# Patient Record
Sex: MALE | Race: WHITE | NOT HISPANIC OR LATINO
[De-identification: names, ages, dates, MRNs, and addresses within clinical notes are randomized per-mention and may not be internally consistent; named-entity substitution may affect disease eponyms.]

---

## 2018-01-10 ENCOUNTER — APPOINTMENT (OUTPATIENT)
Dept: OPHTHALMOLOGY | Facility: CLINIC | Age: 73
End: 2018-01-10
Payer: MEDICARE

## 2018-01-10 PROCEDURE — 92225: CPT | Mod: RT

## 2018-01-10 PROCEDURE — 92014 COMPRE OPH EXAM EST PT 1/>: CPT

## 2018-04-30 ENCOUNTER — APPOINTMENT (OUTPATIENT)
Dept: UROLOGY | Facility: CLINIC | Age: 73
End: 2018-04-30
Payer: MEDICARE

## 2018-04-30 VITALS
WEIGHT: 205 LBS | SYSTOLIC BLOOD PRESSURE: 124 MMHG | DIASTOLIC BLOOD PRESSURE: 69 MMHG | HEIGHT: 78 IN | OXYGEN SATURATION: 95 % | BODY MASS INDEX: 23.72 KG/M2 | HEART RATE: 60 BPM | TEMPERATURE: 98.5 F

## 2018-04-30 DIAGNOSIS — Q63.1 LOBULATED, FUSED AND HORSESHOE KIDNEY: ICD-10-CM

## 2018-04-30 DIAGNOSIS — Z00.00 ENCOUNTER FOR GENERAL ADULT MEDICAL EXAMINATION W/OUT ABNORMAL FINDINGS: ICD-10-CM

## 2018-04-30 DIAGNOSIS — R73.09 OTHER ABNORMAL GLUCOSE: ICD-10-CM

## 2018-04-30 PROCEDURE — 99204 OFFICE O/P NEW MOD 45 MIN: CPT

## 2018-05-01 LAB
APPEARANCE: CLEAR
BACTERIA: NEGATIVE
BILIRUBIN URINE: NEGATIVE
BLOOD URINE: NEGATIVE
COLOR: YELLOW
GLUCOSE QUALITATIVE U: NEGATIVE MG/DL
KETONES URINE: NEGATIVE
LEUKOCYTE ESTERASE URINE: NEGATIVE
MICROSCOPIC-UA: NORMAL
NITRITE URINE: NEGATIVE
PH URINE: 5.5
PROTEIN URINE: NEGATIVE MG/DL
RED BLOOD CELLS URINE: 1 /HPF
SPECIFIC GRAVITY URINE: 1.01
SQUAMOUS EPITHELIAL CELLS: 0 /HPF
UROBILINOGEN URINE: NEGATIVE MG/DL
WHITE BLOOD CELLS URINE: 0 /HPF

## 2018-05-04 LAB — CORE LAB FLUID CYTOLOGY: NORMAL

## 2018-05-10 ENCOUNTER — FORM ENCOUNTER (OUTPATIENT)
Age: 73
End: 2018-05-10

## 2018-05-11 ENCOUNTER — APPOINTMENT (OUTPATIENT)
Dept: CT IMAGING | Facility: IMAGING CENTER | Age: 73
End: 2018-05-11
Payer: MEDICARE

## 2018-05-11 ENCOUNTER — OUTPATIENT (OUTPATIENT)
Dept: OUTPATIENT SERVICES | Facility: HOSPITAL | Age: 73
LOS: 1 days | End: 2018-05-11
Payer: MEDICARE

## 2018-05-11 DIAGNOSIS — R73.09 OTHER ABNORMAL GLUCOSE: ICD-10-CM

## 2018-05-11 DIAGNOSIS — R31.9 HEMATURIA, UNSPECIFIED: ICD-10-CM

## 2018-05-11 DIAGNOSIS — Z00.00 ENCOUNTER FOR GENERAL ADULT MEDICAL EXAMINATION WITHOUT ABNORMAL FINDINGS: ICD-10-CM

## 2018-05-11 PROCEDURE — 74178 CT ABD&PLV WO CNTR FLWD CNTR: CPT | Mod: 26

## 2018-05-11 PROCEDURE — 74178 CT ABD&PLV WO CNTR FLWD CNTR: CPT

## 2018-06-06 ENCOUNTER — CLINICAL ADVICE (OUTPATIENT)
Age: 73
End: 2018-06-06

## 2018-06-06 ENCOUNTER — APPOINTMENT (OUTPATIENT)
Age: 73
End: 2018-06-06
Payer: MEDICARE

## 2018-06-06 VITALS
WEIGHT: 210 LBS | HEART RATE: 109 BPM | SYSTOLIC BLOOD PRESSURE: 130 MMHG | TEMPERATURE: 98.2 F | BODY MASS INDEX: 32.96 KG/M2 | RESPIRATION RATE: 18 BRPM | HEIGHT: 66.93 IN | DIASTOLIC BLOOD PRESSURE: 76 MMHG | OXYGEN SATURATION: 98 %

## 2018-06-06 DIAGNOSIS — R31.9 HEMATURIA, UNSPECIFIED: ICD-10-CM

## 2018-06-06 PROCEDURE — 99214 OFFICE O/P EST MOD 30 MIN: CPT | Mod: 25

## 2018-06-06 PROCEDURE — 52000 CYSTOURETHROSCOPY: CPT

## 2018-06-07 LAB
APPEARANCE: ABNORMAL
BACTERIA UR CULT: NORMAL
BACTERIA: NEGATIVE
BILIRUBIN URINE: NEGATIVE
BLOOD URINE: ABNORMAL
COLOR: ABNORMAL
GLUCOSE QUALITATIVE U: NEGATIVE MG/DL
HYALINE CASTS: 1 /LPF
KETONES URINE: NEGATIVE
LEUKOCYTE ESTERASE URINE: NEGATIVE
MICROSCOPIC-UA: NORMAL
NITRITE URINE: NEGATIVE
PH URINE: 6
PROTEIN URINE: 30 MG/DL
RED BLOOD CELLS URINE: >720 /HPF
SPECIFIC GRAVITY URINE: 1.02
SQUAMOUS EPITHELIAL CELLS: 1 /HPF
UROBILINOGEN URINE: NEGATIVE MG/DL
WHITE BLOOD CELLS URINE: 7 /HPF

## 2018-06-08 ENCOUNTER — APPOINTMENT (OUTPATIENT)
Dept: UROLOGY | Facility: CLINIC | Age: 73
End: 2018-06-08

## 2018-06-08 ENCOUNTER — MESSAGE (OUTPATIENT)
Age: 73
End: 2018-06-08

## 2018-07-11 ENCOUNTER — APPOINTMENT (OUTPATIENT)
Dept: UROLOGY | Facility: CLINIC | Age: 73
End: 2018-07-11
Payer: MEDICARE

## 2018-07-11 VITALS
DIASTOLIC BLOOD PRESSURE: 77 MMHG | BODY MASS INDEX: 33.27 KG/M2 | HEART RATE: 112 BPM | SYSTOLIC BLOOD PRESSURE: 139 MMHG | WEIGHT: 212 LBS | OXYGEN SATURATION: 96 % | RESPIRATION RATE: 18 BRPM

## 2018-07-11 DIAGNOSIS — N40.1 BENIGN PROSTATIC HYPERPLASIA WITH LOWER URINARY TRACT SYMPMS: ICD-10-CM

## 2018-07-11 DIAGNOSIS — R31.0 GROSS HEMATURIA: ICD-10-CM

## 2018-07-11 PROCEDURE — 99213 OFFICE O/P EST LOW 20 MIN: CPT

## 2018-07-11 RX ORDER — TAMSULOSIN HYDROCHLORIDE 0.4 MG/1
0.4 CAPSULE ORAL
Qty: 90 | Refills: 3 | Status: DISCONTINUED | COMMUNITY
Start: 2018-06-06 | End: 2018-07-11

## 2018-07-13 PROBLEM — N40.1 BENIGN LOCALIZED PROSTATIC HYPERPLASIA WITH LOWER URINARY TRACT SYMPTOMS (LUTS): Status: ACTIVE | Noted: 2018-04-30

## 2018-07-13 PROBLEM — R31.0 GROSS HEMATURIA: Status: ACTIVE | Noted: 2018-04-30

## 2018-12-04 ENCOUNTER — INPATIENT (INPATIENT)
Facility: HOSPITAL | Age: 73
LOS: 3 days | Discharge: HOME | DRG: 603 | End: 2018-12-08
Attending: INTERNAL MEDICINE | Admitting: HOSPITALIST
Payer: MEDICARE

## 2018-12-04 VITALS
RESPIRATION RATE: 16 BRPM | HEIGHT: 67 IN | TEMPERATURE: 97 F | DIASTOLIC BLOOD PRESSURE: 60 MMHG | WEIGHT: 199.96 LBS | HEART RATE: 94 BPM | OXYGEN SATURATION: 96 % | SYSTOLIC BLOOD PRESSURE: 140 MMHG

## 2018-12-04 DIAGNOSIS — Z96.652 PRESENCE OF LEFT ARTIFICIAL KNEE JOINT: Chronic | ICD-10-CM

## 2018-12-04 DIAGNOSIS — L03.90 CELLULITIS, UNSPECIFIED: ICD-10-CM

## 2018-12-04 LAB
ALBUMIN SERPL ELPH-MCNC: 3.6 G/DL — SIGNIFICANT CHANGE UP (ref 3.3–5)
ALP SERPL-CCNC: 160 U/L — HIGH (ref 40–120)
ALT FLD-CCNC: <5 U/L — LOW (ref 10–45)
ANION GAP SERPL CALC-SCNC: 13 MMOL/L — SIGNIFICANT CHANGE UP (ref 5–17)
APTT BLD: 33.7 SEC — SIGNIFICANT CHANGE UP (ref 27.5–36.3)
AST SERPL-CCNC: 9 U/L — LOW (ref 10–40)
BASE EXCESS BLDV CALC-SCNC: 3.7 MMOL/L — HIGH (ref -2–2)
BASOPHILS # BLD AUTO: 0 K/UL — SIGNIFICANT CHANGE UP (ref 0–0.2)
BASOPHILS NFR BLD AUTO: 0.4 % — SIGNIFICANT CHANGE UP (ref 0–2)
BILIRUB SERPL-MCNC: 0.3 MG/DL — SIGNIFICANT CHANGE UP (ref 0.2–1.2)
BUN SERPL-MCNC: 20 MG/DL — SIGNIFICANT CHANGE UP (ref 7–23)
CA-I SERPL-SCNC: 1.16 MMOL/L — SIGNIFICANT CHANGE UP (ref 1.12–1.3)
CALCIUM SERPL-MCNC: 9.6 MG/DL — SIGNIFICANT CHANGE UP (ref 8.4–10.5)
CHLORIDE BLDV-SCNC: 107 MMOL/L — SIGNIFICANT CHANGE UP (ref 96–108)
CHLORIDE SERPL-SCNC: 100 MMOL/L — SIGNIFICANT CHANGE UP (ref 96–108)
CO2 BLDV-SCNC: 30 MMOL/L — SIGNIFICANT CHANGE UP (ref 22–30)
CO2 SERPL-SCNC: 24 MMOL/L — SIGNIFICANT CHANGE UP (ref 22–31)
CREAT SERPL-MCNC: 0.8 MG/DL — SIGNIFICANT CHANGE UP (ref 0.5–1.3)
EOSINOPHIL # BLD AUTO: 0.1 K/UL — SIGNIFICANT CHANGE UP (ref 0–0.5)
EOSINOPHIL NFR BLD AUTO: 1.6 % — SIGNIFICANT CHANGE UP (ref 0–6)
GAS PNL BLDV: 133 MMOL/L — LOW (ref 136–145)
GAS PNL BLDV: SIGNIFICANT CHANGE UP
GLUCOSE BLDV-MCNC: 109 MG/DL — HIGH (ref 70–99)
GLUCOSE SERPL-MCNC: 110 MG/DL — HIGH (ref 70–99)
HCO3 BLDV-SCNC: 29 MMOL/L — SIGNIFICANT CHANGE UP (ref 21–29)
HCT VFR BLD CALC: 31.7 % — LOW (ref 39–50)
HCT VFR BLDA CALC: 32 % — LOW (ref 39–50)
HGB BLD CALC-MCNC: 10.3 G/DL — LOW (ref 13–17)
HGB BLD-MCNC: 10.6 G/DL — LOW (ref 13–17)
INR BLD: 1.19 RATIO — HIGH (ref 0.88–1.16)
LACTATE BLDV-MCNC: 1 MMOL/L — SIGNIFICANT CHANGE UP (ref 0.7–2)
LYMPHOCYTES # BLD AUTO: 1.3 K/UL — SIGNIFICANT CHANGE UP (ref 1–3.3)
LYMPHOCYTES # BLD AUTO: 20.9 % — SIGNIFICANT CHANGE UP (ref 13–44)
MCHC RBC-ENTMCNC: 27.5 PG — SIGNIFICANT CHANGE UP (ref 27–34)
MCHC RBC-ENTMCNC: 33.5 GM/DL — SIGNIFICANT CHANGE UP (ref 32–36)
MCV RBC AUTO: 82.1 FL — SIGNIFICANT CHANGE UP (ref 80–100)
MONOCYTES # BLD AUTO: 0.7 K/UL — SIGNIFICANT CHANGE UP (ref 0–0.9)
MONOCYTES NFR BLD AUTO: 10.5 % — SIGNIFICANT CHANGE UP (ref 2–14)
NEUTROPHILS # BLD AUTO: 4.2 K/UL — SIGNIFICANT CHANGE UP (ref 1.8–7.4)
NEUTROPHILS NFR BLD AUTO: 66.6 % — SIGNIFICANT CHANGE UP (ref 43–77)
OTHER CELLS CSF MANUAL: 4 ML/DL — LOW (ref 18–22)
PCO2 BLDV: 48 MMHG — SIGNIFICANT CHANGE UP (ref 35–50)
PH BLDV: 7.39 — SIGNIFICANT CHANGE UP (ref 7.35–7.45)
PLATELET # BLD AUTO: 274 K/UL — SIGNIFICANT CHANGE UP (ref 150–400)
PO2 BLDV: 24 MMHG — LOW (ref 25–45)
POTASSIUM BLDV-SCNC: 3.8 MMOL/L — SIGNIFICANT CHANGE UP (ref 3.5–5.3)
POTASSIUM SERPL-MCNC: 4 MMOL/L — SIGNIFICANT CHANGE UP (ref 3.5–5.3)
POTASSIUM SERPL-SCNC: 4 MMOL/L — SIGNIFICANT CHANGE UP (ref 3.5–5.3)
PROT SERPL-MCNC: 8 G/DL — SIGNIFICANT CHANGE UP (ref 6–8.3)
PROTHROM AB SERPL-ACNC: 13.8 SEC — HIGH (ref 10–12.9)
RBC # BLD: 3.86 M/UL — LOW (ref 4.2–5.8)
RBC # FLD: 15.5 % — HIGH (ref 10.3–14.5)
SAO2 % BLDV: 26 % — LOW (ref 67–88)
SODIUM SERPL-SCNC: 137 MMOL/L — SIGNIFICANT CHANGE UP (ref 135–145)
WBC # BLD: 6.3 K/UL — SIGNIFICANT CHANGE UP (ref 3.8–10.5)
WBC # FLD AUTO: 6.3 K/UL — SIGNIFICANT CHANGE UP (ref 3.8–10.5)

## 2018-12-04 PROCEDURE — 99223 1ST HOSP IP/OBS HIGH 75: CPT

## 2018-12-04 PROCEDURE — 10061 I&D ABSCESS COMP/MULTIPLE: CPT

## 2018-12-04 PROCEDURE — 93010 ELECTROCARDIOGRAM REPORT: CPT | Mod: 59

## 2018-12-04 PROCEDURE — 71046 X-RAY EXAM CHEST 2 VIEWS: CPT | Mod: 26

## 2018-12-04 PROCEDURE — 93971 EXTREMITY STUDY: CPT | Mod: 26

## 2018-12-04 PROCEDURE — 99285 EMERGENCY DEPT VISIT HI MDM: CPT | Mod: 25

## 2018-12-04 RX ORDER — OXYCODONE HYDROCHLORIDE 5 MG/1
5 TABLET ORAL ONCE
Qty: 0 | Refills: 0 | Status: DISCONTINUED | OUTPATIENT
Start: 2018-12-04 | End: 2018-12-04

## 2018-12-04 RX ORDER — VANCOMYCIN HCL 1 G
1000 VIAL (EA) INTRAVENOUS ONCE
Qty: 0 | Refills: 0 | Status: DISCONTINUED | OUTPATIENT
Start: 2018-12-04 | End: 2018-12-04

## 2018-12-04 RX ORDER — ACETAMINOPHEN 500 MG
650 TABLET ORAL EVERY 6 HOURS
Qty: 0 | Refills: 0 | Status: DISCONTINUED | OUTPATIENT
Start: 2018-12-04 | End: 2018-12-08

## 2018-12-04 RX ORDER — ACETAMINOPHEN 500 MG
975 TABLET ORAL ONCE
Qty: 0 | Refills: 0 | Status: COMPLETED | OUTPATIENT
Start: 2018-12-04 | End: 2018-12-04

## 2018-12-04 RX ADMIN — Medication 100 MILLIGRAM(S): at 20:29

## 2018-12-04 NOTE — H&P ADULT - ASSESSMENT
73M with PMH CAD s/p stent (2007), s/p PPM (2012), hypothyroidism, multiple L knee replacements, chronic venous stasis of b/l LE (L>>R) sent by PMD for LLE wound, found with LLE abscess s/p I&D, admitted for cellulitis.

## 2018-12-04 NOTE — ED ADULT NURSE NOTE - NSIMPLEMENTINTERV_GEN_ALL_ED
Implemented All Universal Safety Interventions:  [place] to call system. Call bell, personal items and telephone within reach. Instruct patient to call for assistance. Room bathroom lighting operational. Non-slip footwear when patient is off stretcher. Physically safe environment: no spills, clutter or unnecessary equipment. Stretcher in lowest position, wheels locked, appropriate side rails in place.

## 2018-12-04 NOTE — ED ADULT NURSE NOTE - OBJECTIVE STATEMENT
73 yr old male from home, sent by PMD, for L lower leg open wound, worsening x over few days, on/off numbness in lower leg, amb with cane at home, denies fever/chills, no drainage noted PTA, at present +worsened pitting edema compared to baseline (has chronic edema after varicose vein surgery in 1993, has had multiple L knee surgeries including TKR --> subsequent infections), not on a diuretic, +ambulatory at baseline, I&D done by PA, drained copious amt of pus, now +feels pain relief

## 2018-12-04 NOTE — H&P ADULT - NSHPPHYSICALEXAM_GEN_ALL_CORE
Vital Signs Last 24 Hrs  T(C): 36.6 (04 Dec 2018 20:32), Max: 36.6 (04 Dec 2018 17:29)  T(F): 97.9 (04 Dec 2018 20:32), Max: 97.9 (04 Dec 2018 17:29)  HR: 78 (04 Dec 2018 20:32) (75 - 94)  BP: 146/79 (04 Dec 2018 20:32) (140/60 - 146/79)  BP(mean): --  RR: 16 (04 Dec 2018 20:32) (16 - 17)  SpO2: 99% (04 Dec 2018 20:32) (96% - 99%) Vital Signs Last 24 Hrs  T(C): 36.6 (04 Dec 2018 20:32), Max: 36.6 (04 Dec 2018 17:29)  T(F): 97.9 (04 Dec 2018 20:32), Max: 97.9 (04 Dec 2018 17:29)  HR: 78 (04 Dec 2018 20:32) (75 - 94)  BP: 146/79 (04 Dec 2018 20:32) (140/60 - 146/79)  BP(mean): --  RR: 16 (04 Dec 2018 20:32) (16 - 17)  SpO2: 99% (04 Dec 2018 20:32) (96% - 99%)    PHYSICAL EXAM:  GENERAL: NAD, well-developed  HEAD:  Atraumatic, normocephalic  EYES: EOMI, L pupil unreactive (2/2 prior retinal detachment), R pupil reactive to light   NECK: Supple, no JVD  CHEST/LUNG: Clear to auscultation bilaterally; no wheezing or rales  HEART: Regular rate and rhythm; no murmurs  ABDOMEN: Soft, nontender, nondistended; bowel sounds present  EXTREMITIES:  2+ Peripheral Pulses, 1+ RLE edema at ankles; LLE with 3+ edema to thighs, erythematous throughout, +warmth; superior to ankle is open wound with packing, +mixed bloody and purulent discharge    PSYCH: calm affect, not anxious  NEUROLOGY: non-focal, AAOx3  SKIN: erythematous skin in LLE with wound as above   MUSCULOSKELETAL: no back pain, moving all extremities

## 2018-12-04 NOTE — H&P ADULT - NSHPREVIEWOFSYSTEMS_GEN_ALL_CORE
REVIEW OF SYSTEMS:    CONSTITUTIONAL: No weakness, fevers, chills  EYES/ENT: No visual changes, throat pain   NECK: No pain or stiffness  RESPIRATORY: No cough, wheezing, no shortness of breath  CARDIOVASCULAR: No chest pain or palpitations  GASTROINTESTINAL: no nausea, vomiting, no abdominal pain, no diarrhea   GENITOURINARY: +polyuria, no dysuria, no hematuria   NEUROLOGICAL: no headaches, no confusion   MUSCULOSKELETAL: no back pain, no weakness   SKIN: LLE wound and skin changes as per HPI; no other rashes or lesions noted    PSYCH: no anxiety, depression  HEME: no gum bleeding, no bruising

## 2018-12-04 NOTE — ED PROVIDER NOTE - MEDICAL DECISION MAKING DETAILS
Pt with gradual swelling of left leg with redness and pain Now has developed a swelling on medial aspect distally 2 by 3 cms fluctuant tender No signs of adenitis left groin or femoral nodes  Left legs scars of knee surgeries Bilateral venous stasis changes heart and lungs wnl pacer in chest non focal neuro exam I and D od the abscess done will admit for iv antibiotics Dr Lainez contacted --brown

## 2018-12-04 NOTE — ED PROVIDER NOTE - OBJECTIVE STATEMENT
72 y/o male hx CAD, chronic venous stasis with chronic LE edema Left > right who presents to the ED from Dr. Case office for LLE wound. patient noticed a "bump" afew days ago and states it has since become raised and erythematous around it. today it began draining. he saw Dr. Lainez who sent him to the ED. patient denies fever/chills/n/v. patient is on doxycycline chronically for recurrent infections.

## 2018-12-04 NOTE — ED PROVIDER NOTE - ATTENDING CONTRIBUTION TO CARE
I have seen and evaluated this patient with the advance practice clinician.   I agree with the findings  unless other wise stated. I have amended notes where needed.  After my face to face bedside evaluation, I am noting: Pt with gradual swelling of left leg with redness and pain Now has developed a swelling on medial aspect distally 2 by 3 cms fluctuant tender No signs of adenitis left groin or femoral nodes  Left legs scars of knee surgeries Bilateral venous stasis changes heart and lungs wnl pacer in chest non focal neuro exam I and D od the abscess done will admit for iv antibiotics Dr Lainez contacted --brown

## 2018-12-04 NOTE — H&P ADULT - PROBLEM SELECTOR PLAN 1
pt with LLE abscess with likely surrounding cellulitis, no systemic s/s   abscess s/p I&D with removal for purulent drainage, currently packed, still draining   will treat with vancomycin for now, monitor vanco levels; can de-escalate to clindamycin if symptoms improving and when cx data returns    f/u blood and abscess cultures   monitor for fevers and trend WBC

## 2018-12-04 NOTE — H&P ADULT - HISTORY OF PRESENT ILLNESS
73M with PMH CAD, ?Afib, chronic venous stasis with b/l LE sent by PMD for LLE wound. 73M with PMH CAD s/p stent (2007), s/p PPM (2012), hypothyroidism, multiple L knee replacements, chronic venous stasis of b/l LE (L>>R) sent by PMD for LLE wound. Per pt, he started noticing a small flat area of redness in his LLE 3d PTA, denies any trauama; over the next 3 days, the area became larger and started to rise. On day of admission, it was a large hard blister; he noted some surrounding redness and warmth, no pain, able to ambulate as normally; pt with baseline LLE swelling for years, worse over the past 4 months, but no significantly worse swelling noted in past 3 days along with lesion. Throughout the day, the blister became soft and fluctuant and pt decided to go to PMD, who sent pt to ED. Denies any fevers, chills, systemic symptoms, GI/ symptoms. Pt has had multiple episodes of cellulitis in LLE. 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 At baseline pt has significant edema to the thighs and skin is erythematous.        No family hx of recurrent skin/soft tissue infections.

## 2018-12-04 NOTE — H&P ADULT - NSHPSOCIALHISTORY_GEN_ALL_CORE
former smoker (quit 30y ago), social etoh, no drugs   walks with a cane at baseline, independent with other ADLs

## 2018-12-04 NOTE — H&P ADULT - NSHPLABSRESULTS_GEN_ALL_CORE
Labs, imaging and EKG personally reviewed and interpreted by me - labs notable for normal WBC, Hb 10.6, Cr 0.80. CXR with no focal consolidations. EKG                           10.6   6.3   )-----------( 274      ( 04 Dec 2018 20:35 )             31.7     12-04    137  |  100  |  20  ----------------------------<  110<H>  4.0   |  24  |  0.80    Ca    9.6      04 Dec 2018 20:35    TPro  8.0  /  Alb  3.6  /  TBili  0.3  /  DBili  x   /  AST  9<L>  /  ALT  <5<L>  /  AlkPhos  160<H>  12-04    PT/INR - ( 04 Dec 2018 20:35 )   PT: 13.8 sec;   INR: 1.19 ratio    PTT - ( 04 Dec 2018 20:35 )  PTT:33.7 sec    < from: Xray Chest 2 Views PA/Lat (12.04.18 @ 21:53) >  ******PRELIMINARY REPORT******        INTERPRETATION:  no emergent findings    < from: [facility] Duplex Lower Ext Vein Scan, Left (12.04.18 @ 21:34) >    There is normal compressibility of the left common femoral, femoral and   popliteal veins. The calf veins are not visualized due to marked   surrounding calf edema.    The contralateral common femoral vein is patent.    Doppler examination shows normal spontaneous and phasic flow.    IMPRESSION:     No evidence of left lower extremity deep venous thrombosis, although the   calf veins are not visualized due to surrounding edema.

## 2018-12-04 NOTE — H&P ADULT - PROBLEM SELECTOR PLAN 2
pt with extensive LLE edema, possibly 2/2 multiple surgeries and venous stasis   Duplex (-) for DVT, although limited exam 2/2 diffuse swelling   b/l 2+ DP pulses   leg elevation   consider compression stocking with cellulitis healed

## 2018-12-05 DIAGNOSIS — L03.116 CELLULITIS OF LEFT LOWER LIMB: ICD-10-CM

## 2018-12-05 DIAGNOSIS — L03.90 CELLULITIS, UNSPECIFIED: ICD-10-CM

## 2018-12-05 DIAGNOSIS — E03.9 HYPOTHYROIDISM, UNSPECIFIED: ICD-10-CM

## 2018-12-05 DIAGNOSIS — Z29.9 ENCOUNTER FOR PROPHYLACTIC MEASURES, UNSPECIFIED: ICD-10-CM

## 2018-12-05 DIAGNOSIS — I25.10 ATHEROSCLEROTIC HEART DISEASE OF NATIVE CORONARY ARTERY WITHOUT ANGINA PECTORIS: ICD-10-CM

## 2018-12-05 DIAGNOSIS — R60.0 LOCALIZED EDEMA: ICD-10-CM

## 2018-12-05 DIAGNOSIS — I73.9 PERIPHERAL VASCULAR DISEASE, UNSPECIFIED: ICD-10-CM

## 2018-12-05 LAB
ANION GAP SERPL CALC-SCNC: 12 MMOL/L — SIGNIFICANT CHANGE UP (ref 5–17)
BUN SERPL-MCNC: 20 MG/DL — SIGNIFICANT CHANGE UP (ref 7–23)
CALCIUM SERPL-MCNC: 9.2 MG/DL — SIGNIFICANT CHANGE UP (ref 8.4–10.5)
CHLORIDE SERPL-SCNC: 102 MMOL/L — SIGNIFICANT CHANGE UP (ref 96–108)
CO2 SERPL-SCNC: 24 MMOL/L — SIGNIFICANT CHANGE UP (ref 22–31)
CREAT SERPL-MCNC: 0.83 MG/DL — SIGNIFICANT CHANGE UP (ref 0.5–1.3)
GLUCOSE SERPL-MCNC: 107 MG/DL — HIGH (ref 70–99)
HCT VFR BLD CALC: 29.8 % — LOW (ref 39–50)
HGB BLD-MCNC: 9.4 G/DL — LOW (ref 13–17)
MAGNESIUM SERPL-MCNC: 2.2 MG/DL — SIGNIFICANT CHANGE UP (ref 1.6–2.6)
MCHC RBC-ENTMCNC: 26.2 PG — LOW (ref 27–34)
MCHC RBC-ENTMCNC: 31.5 GM/DL — LOW (ref 32–36)
MCV RBC AUTO: 83 FL — SIGNIFICANT CHANGE UP (ref 80–100)
PHOSPHATE SERPL-MCNC: 3.5 MG/DL — SIGNIFICANT CHANGE UP (ref 2.5–4.5)
PLATELET # BLD AUTO: 266 K/UL — SIGNIFICANT CHANGE UP (ref 150–400)
POTASSIUM SERPL-MCNC: 4.1 MMOL/L — SIGNIFICANT CHANGE UP (ref 3.5–5.3)
POTASSIUM SERPL-SCNC: 4.1 MMOL/L — SIGNIFICANT CHANGE UP (ref 3.5–5.3)
RBC # BLD: 3.59 M/UL — LOW (ref 4.2–5.8)
RBC # FLD: 16.8 % — HIGH (ref 10.3–14.5)
SODIUM SERPL-SCNC: 138 MMOL/L — SIGNIFICANT CHANGE UP (ref 135–145)
TSH SERPL-MCNC: 2.64 UIU/ML — SIGNIFICANT CHANGE UP (ref 0.27–4.2)
WBC # BLD: 6.32 K/UL — SIGNIFICANT CHANGE UP (ref 3.8–10.5)
WBC # FLD AUTO: 6.32 K/UL — SIGNIFICANT CHANGE UP (ref 3.8–10.5)

## 2018-12-05 PROCEDURE — 99233 SBSQ HOSP IP/OBS HIGH 50: CPT

## 2018-12-05 RX ORDER — SIMVASTATIN 20 MG/1
40 TABLET, FILM COATED ORAL AT BEDTIME
Qty: 0 | Refills: 0 | Status: DISCONTINUED | OUTPATIENT
Start: 2018-12-05 | End: 2018-12-08

## 2018-12-05 RX ORDER — VANCOMYCIN HCL 1 G
1000 VIAL (EA) INTRAVENOUS EVERY 12 HOURS
Qty: 0 | Refills: 0 | Status: DISCONTINUED | OUTPATIENT
Start: 2018-12-05 | End: 2018-12-07

## 2018-12-05 RX ORDER — LEVOTHYROXINE SODIUM 125 MCG
137 TABLET ORAL DAILY
Qty: 0 | Refills: 0 | Status: DISCONTINUED | OUTPATIENT
Start: 2018-12-05 | End: 2018-12-08

## 2018-12-05 RX ORDER — OXYCODONE AND ACETAMINOPHEN 5; 325 MG/1; MG/1
1 TABLET ORAL EVERY 6 HOURS
Qty: 0 | Refills: 0 | Status: DISCONTINUED | OUTPATIENT
Start: 2018-12-05 | End: 2018-12-08

## 2018-12-05 RX ORDER — ASPIRIN/CALCIUM CARB/MAGNESIUM 324 MG
81 TABLET ORAL DAILY
Qty: 0 | Refills: 0 | Status: DISCONTINUED | OUTPATIENT
Start: 2018-12-05 | End: 2018-12-08

## 2018-12-05 RX ORDER — ENOXAPARIN SODIUM 100 MG/ML
40 INJECTION SUBCUTANEOUS EVERY 24 HOURS
Qty: 0 | Refills: 0 | Status: DISCONTINUED | OUTPATIENT
Start: 2018-12-05 | End: 2018-12-08

## 2018-12-05 RX ADMIN — Medication 250 MILLIGRAM(S): at 17:52

## 2018-12-05 RX ADMIN — ENOXAPARIN SODIUM 40 MILLIGRAM(S): 100 INJECTION SUBCUTANEOUS at 05:30

## 2018-12-05 RX ADMIN — Medication 81 MILLIGRAM(S): at 13:30

## 2018-12-05 RX ADMIN — SIMVASTATIN 40 MILLIGRAM(S): 20 TABLET, FILM COATED ORAL at 21:05

## 2018-12-05 RX ADMIN — Medication 250 MILLIGRAM(S): at 05:30

## 2018-12-05 RX ADMIN — Medication 137 MICROGRAM(S): at 05:30

## 2018-12-05 NOTE — PROGRESS NOTE ADULT - PROBLEM SELECTOR PLAN 1
The patient has been afebrile, WBC 6, He has been on outpatient doxycycline PO bid for months as he had cellulitis LLE a year ago. Seen by Dr Perez outpatient. I & D done in ER for abscess left leg  - IV Vancomycin 1 gm q 12hrs, awaiting on wound c/s  - Vascular Sx consult called, Doppler LE negative DVT The patient has been afebrile, WBC 6, He has been on outpatient doxycycline PO bid for months as he had cellulitis LLE a year ago. Seen by Dr Perez outpatient. I & D done in ER for abscess left leg  - IV Vancomycin 1 gm q 12hrs, awaiting on wound c/s  - Vascular Sx consult called, Doppler LE negative DVT  - wound care consult

## 2018-12-05 NOTE — PROGRESS NOTE ADULT - ASSESSMENT
73M with PMH CAD s/p stent (2007), s/p PPM (2012), hypothyroidism, multiple L knee replacements, chronic venous stasis of b/l LE (L>>R), cellulitis in left leg a year ago on Doxycycline PO sent by PMD for LLE wound, found with LLE abscess s/p I & D in ER. Afebrile, WBC 6.3

## 2018-12-05 NOTE — CONSULT NOTE ADULT - SUBJECTIVE AND OBJECTIVE BOX
Vascular Surgery Consult  Consulting surgical team: Vascular  Consulting attending: Dr. Streeter    HPI:  73M with PMH CAD s/p stent (2007), s/p PPM (2012), hypothyroidism, multiple L knee replacements, chronic venous stasis of b/l LE (L>>R) sent by PMD for LLE wound. Per pt, he started noticing a small flat area of redness in his LLE 3d PTA, denies any trauama; over the next 3 days, the area became larger and started to rise. On day of admission, it was a large hard blister; he noted some surrounding redness and warmth, no pain, able to ambulate as normally; pt with baseline LLE swelling for years, worse over the past 4 months, but no significantly worse swelling noted in past 3 days along with lesion. Throughout the day, the blister became soft and fluctuant and pt decided to go to PMD, who sent pt to ED. Denies any fevers, chills, systemic symptoms, GI/ symptoms. Pt has had multiple episodes of cellulitis in LLE. 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 At baseline pt has significant edema to the thighs and skin is erythematous.          PAST MEDICAL HISTORY:  Varicose vein of leg  CAD (coronary artery disease)  Afib      PAST SURGICAL HISTORY:  History of total left knee replacement (TKR)      MEDICATIONS:  acetaminophen   Tablet .. 650 milliGRAM(s) Oral every 6 hours PRN  aspirin  chewable 81 milliGRAM(s) Oral daily  enoxaparin Injectable 40 milliGRAM(s) SubCutaneous every 24 hours  levothyroxine 137 MICROGram(s) Oral daily  oxyCODONE    5 mG/acetaminophen 325 mG 1 Tablet(s) Oral every 6 hours PRN  simvastatin 40 milliGRAM(s) Oral at bedtime  vancomycin  IVPB 1000 milliGRAM(s) IV Intermittent every 12 hours      ALLERGIES:  No Known Allergies      VITALS & I/Os:  Vital Signs Last 24 Hrs  T(C): 36.9 (05 Dec 2018 21:02), Max: 36.9 (05 Dec 2018 21:02)  T(F): 98.5 (05 Dec 2018 21:02), Max: 98.5 (05 Dec 2018 21:02)  HR: 66 (05 Dec 2018 21:02) (66 - 81)  BP: 137/70 (05 Dec 2018 21:02) (115/47 - 137/70)  BP(mean): --  RR: 18 (05 Dec 2018 21:02) (17 - 18)  SpO2: 97% (05 Dec 2018 21:02) (96% - 97%)    I&O's Summary      PHYSICAL EXAM:  General: No acute distress  Respiratory: Nonlabored  Cardiovascular: RRR  Abdominal: Soft, nondistended, nontender.   Ext: WWP, B/l edema, LLE with erythema and open wound superior to ankle with drainage.     LABS:                        9.4    6.32  )-----------( 266      ( 05 Dec 2018 08:07 )             29.8     12-05    138  |  102  |  20  ----------------------------<  107<H>  4.1   |  24  |  0.83    Ca    9.2      05 Dec 2018 06:09  Phos  3.5     12-05  Mg     2.2     12-05    TPro  8.0  /  Alb  3.6  /  TBili  0.3  /  DBili  x   /  AST  9<L>  /  ALT  <5<L>  /  AlkPhos  160<H>  12-04    Lactate:    PT/INR - ( 04 Dec 2018 20:35 )   PT: 13.8 sec;   INR: 1.19 ratio         PTT - ( 04 Dec 2018 20:35 )  PTT:33.7 sec              IMAGING:

## 2018-12-05 NOTE — PROGRESS NOTE ADULT - SUBJECTIVE AND OBJECTIVE BOX
Patient is a 73y old  Male who presents with a chief complaint of cellulitis/abscess LLE.    SUBJECTIVE / OVERNIGHT EVENTS:  Sitting in bed, mild pain in LLE at the I & D site, afebrile      MEDICATIONS  (STANDING):  aspirin  chewable 81 milliGRAM(s) Oral daily  enoxaparin Injectable 40 milliGRAM(s) SubCutaneous every 24 hours  levothyroxine 137 MICROGram(s) Oral daily  simvastatin 40 milliGRAM(s) Oral at bedtime  vancomycin  IVPB 1000 milliGRAM(s) IV Intermittent every 12 hours    MEDICATIONS  (PRN):  acetaminophen   Tablet .. 650 milliGRAM(s) Oral every 6 hours PRN Temp greater or equal to 38C (100.4F), Mild Pain (1 - 3)      Vital Signs Last 24 Hrs  T(C): 36.8 (05 Dec 2018 07:44), Max: 36.8 (05 Dec 2018 00:29)  T(F): 98.3 (05 Dec 2018 07:44), Max: 98.3 (05 Dec 2018 07:44)  HR: 73 (05 Dec 2018 07:44) (71 - 81)  BP: 115/47 (05 Dec 2018 07:44) (115/47 - 146/79)  BP(mean): --  RR: 17 (05 Dec 2018 07:44) (16 - 18)  SpO2: 96% (05 Dec 2018 07:44) (96% - 99%)  CAPILLARY BLOOD GLUCOSE        I&O's Summary      PHYSICAL EXAM:-  GENERAL: NAD, well-developed  EYES: EOMI, PERRLA, conjunctiva and sclera clear  NECK: Supple, No JVD, no thyromegaly  CHEST/LUNG: Clear to auscultation bilaterally; No wheeze  HEART: Regular rate and rhythm; S1, S2 audible, No murmurs, rubs, or gallops  ABDOMEN: Soft, Nontender, Nondistended; Bowel sounds present  EXTREMITIES: LLE- bandage in distal LLE at I & D site, warm ext, pulses are palpable, No clubbing, cyanosis, 1+ edema LLE  NEURO: AAOx3, no focal deficit      LABS:                        9.4    6.32  )-----------( 266      ( 05 Dec 2018 08:07 )             29.8     12-05    138  |  102  |  20  ----------------------------<  107<H>  4.1   |  24  |  0.83    Ca    9.2      05 Dec 2018 06:09  Phos  3.5     12-05  Mg     2.2     12-05    TPro  8.0  /  Alb  3.6  /  TBili  0.3  /  DBili  x   /  AST  9<L>  /  ALT  <5<L>  /  AlkPhos  160<H>  12-04    PT/INR - ( 04 Dec 2018 20:35 )   PT: 13.8 sec;   INR: 1.19 ratio    PTT - ( 04 Dec 2018 20:35 )  PTT:33.7 sec          RADIOLOGY & ADDITIONAL TESTS:    Imaging Personally Reviewed: CXR  Care Discussed with Consultants/Other Providers: Vascular Sx

## 2018-12-06 LAB
-  AMIKACIN: SIGNIFICANT CHANGE UP
-  AMOXICILLIN/CLAVULANIC ACID: SIGNIFICANT CHANGE UP
-  AMPICILLIN/SULBACTAM: SIGNIFICANT CHANGE UP
-  AMPICILLIN: SIGNIFICANT CHANGE UP
-  AZTREONAM: SIGNIFICANT CHANGE UP
-  CEFAZOLIN: SIGNIFICANT CHANGE UP
-  CEFEPIME: SIGNIFICANT CHANGE UP
-  CEFOXITIN: SIGNIFICANT CHANGE UP
-  CEFTRIAXONE: SIGNIFICANT CHANGE UP
-  CIPROFLOXACIN: SIGNIFICANT CHANGE UP
-  ERTAPENEM: SIGNIFICANT CHANGE UP
-  GENTAMICIN: SIGNIFICANT CHANGE UP
-  IMIPENEM: SIGNIFICANT CHANGE UP
-  LEVOFLOXACIN: SIGNIFICANT CHANGE UP
-  MEROPENEM: SIGNIFICANT CHANGE UP
-  PIPERACILLIN/TAZOBACTAM: SIGNIFICANT CHANGE UP
-  TOBRAMYCIN: SIGNIFICANT CHANGE UP
-  TRIMETHOPRIM/SULFAMETHOXAZOLE: SIGNIFICANT CHANGE UP
ANION GAP SERPL CALC-SCNC: 10 MMOL/L — SIGNIFICANT CHANGE UP (ref 5–17)
BASOPHILS # BLD AUTO: 0.02 K/UL — SIGNIFICANT CHANGE UP (ref 0–0.2)
BASOPHILS NFR BLD AUTO: 0.4 % — SIGNIFICANT CHANGE UP (ref 0–2)
BUN SERPL-MCNC: 15 MG/DL — SIGNIFICANT CHANGE UP (ref 7–23)
CALCIUM SERPL-MCNC: 8.8 MG/DL — SIGNIFICANT CHANGE UP (ref 8.4–10.5)
CHLORIDE SERPL-SCNC: 104 MMOL/L — SIGNIFICANT CHANGE UP (ref 96–108)
CO2 SERPL-SCNC: 26 MMOL/L — SIGNIFICANT CHANGE UP (ref 22–31)
CREAT SERPL-MCNC: 0.76 MG/DL — SIGNIFICANT CHANGE UP (ref 0.5–1.3)
EOSINOPHIL # BLD AUTO: 0.19 K/UL — SIGNIFICANT CHANGE UP (ref 0–0.5)
EOSINOPHIL NFR BLD AUTO: 4 % — SIGNIFICANT CHANGE UP (ref 0–6)
ERYTHROCYTE [SEDIMENTATION RATE] IN BLOOD: 71 MM/HR — HIGH (ref 0–20)
GLUCOSE SERPL-MCNC: 97 MG/DL — SIGNIFICANT CHANGE UP (ref 70–99)
HCT VFR BLD CALC: 28.3 % — LOW (ref 39–50)
HGB BLD-MCNC: 9.1 G/DL — LOW (ref 13–17)
IMM GRANULOCYTES NFR BLD AUTO: 0 % — SIGNIFICANT CHANGE UP (ref 0–1.5)
LYMPHOCYTES # BLD AUTO: 1.43 K/UL — SIGNIFICANT CHANGE UP (ref 1–3.3)
LYMPHOCYTES # BLD AUTO: 30.2 % — SIGNIFICANT CHANGE UP (ref 13–44)
MCHC RBC-ENTMCNC: 26.1 PG — LOW (ref 27–34)
MCHC RBC-ENTMCNC: 32.2 GM/DL — SIGNIFICANT CHANGE UP (ref 32–36)
MCV RBC AUTO: 81.1 FL — SIGNIFICANT CHANGE UP (ref 80–100)
METHOD TYPE: SIGNIFICANT CHANGE UP
MONOCYTES # BLD AUTO: 0.59 K/UL — SIGNIFICANT CHANGE UP (ref 0–0.9)
MONOCYTES NFR BLD AUTO: 12.4 % — SIGNIFICANT CHANGE UP (ref 2–14)
NEUTROPHILS # BLD AUTO: 2.51 K/UL — SIGNIFICANT CHANGE UP (ref 1.8–7.4)
NEUTROPHILS NFR BLD AUTO: 53 % — SIGNIFICANT CHANGE UP (ref 43–77)
PLATELET # BLD AUTO: 236 K/UL — SIGNIFICANT CHANGE UP (ref 150–400)
POTASSIUM SERPL-MCNC: 4.1 MMOL/L — SIGNIFICANT CHANGE UP (ref 3.5–5.3)
POTASSIUM SERPL-SCNC: 4.1 MMOL/L — SIGNIFICANT CHANGE UP (ref 3.5–5.3)
RBC # BLD: 3.49 M/UL — LOW (ref 4.2–5.8)
RBC # FLD: 16.9 % — HIGH (ref 10.3–14.5)
SODIUM SERPL-SCNC: 140 MMOL/L — SIGNIFICANT CHANGE UP (ref 135–145)
VANCOMYCIN TROUGH SERPL-MCNC: 8.3 UG/ML — LOW (ref 10–20)
WBC # BLD: 4.74 K/UL — SIGNIFICANT CHANGE UP (ref 3.8–10.5)
WBC # FLD AUTO: 4.74 K/UL — SIGNIFICANT CHANGE UP (ref 3.8–10.5)

## 2018-12-06 PROCEDURE — 99222 1ST HOSP IP/OBS MODERATE 55: CPT

## 2018-12-06 PROCEDURE — 99233 SBSQ HOSP IP/OBS HIGH 50: CPT

## 2018-12-06 RX ADMIN — Medication 250 MILLIGRAM(S): at 05:08

## 2018-12-06 RX ADMIN — Medication 250 MILLIGRAM(S): at 18:48

## 2018-12-06 RX ADMIN — SIMVASTATIN 40 MILLIGRAM(S): 20 TABLET, FILM COATED ORAL at 21:16

## 2018-12-06 RX ADMIN — ENOXAPARIN SODIUM 40 MILLIGRAM(S): 100 INJECTION SUBCUTANEOUS at 05:08

## 2018-12-06 RX ADMIN — Medication 81 MILLIGRAM(S): at 11:18

## 2018-12-06 RX ADMIN — Medication 137 MICROGRAM(S): at 05:08

## 2018-12-06 NOTE — CONSULT NOTE ADULT - SUBJECTIVE AND OBJECTIVE BOX
"HPI: 73M with PMH CAD s/p stent (2007), s/p PPM (2012), hypothyroidism, multiple L knee replacements, chronic venous stasis of b/l LE (L>>R) sent by PMD for LLE wound. Per pt, he started noticing a small flat area of redness in his LLE 3d PTA, denies any trauama; over the next 3 days, the area became larger and started to rise. On day of admission, it was a large hard blister; he noted some surrounding redness and warmth, no pain, able to ambulate as normally; pt with baseline LLE swelling for years, worse over the past 4 months, but no significantly worse swelling noted in past 3 days along with lesion. Throughout the day, the blister became soft and fluctuant and pt decided to go to PMD, who sent pt to ED. Denies any fevers, chills, systemic symptoms, GI/ symptoms. Pt has had multiple episodes of cellulitis in LLE. 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 At baseline pt has significant edema to the thighs and skin is erythematous.        No family hx of recurrent skin/soft tissue infections. (04 Dec 2018 23:41)"    Above reviewed. Saw/spoke to patient. Patient with history multiple knee surgery, chronic venous stasis, PPM presenting with LLE redness. Patient noted gradual worsening of area of redness on LLE, increasing warmth, redness. Minimal pain, then appeared as blister/abscess. 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 Alternatively, patient states no known history of having MRSA or staph infections. ID called for further eval.    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  CAD (coronary artery disease)  Afib  History of total left knee replacement (TKR)    Allergies    No Known Allergies    ANTIMICROBIALS:  vancomycin  IVPB 1000 every 12 hours    OTHER MEDS:  acetaminophen   Tablet .. 650 milliGRAM(s) Oral every 6 hours PRN  aspirin  chewable 81 milliGRAM(s) Oral daily  enoxaparin Injectable 40 milliGRAM(s) SubCutaneous every 24 hours  levothyroxine 137 MICROGram(s) Oral daily  oxyCODONE    5 mG/acetaminophen 325 mG 1 Tablet(s) Oral every 6 hours PRN  simvastatin 40 milliGRAM(s) Oral at bedtime    SOCIAL HISTORY: No tobacco, no alcohol, no illicit drugs    FAMILY HISTORY:  No pertinent family history in first degree relatives relating to chief complaint    Drug Dosing Weight  Height (cm): 170.18 (04 Dec 2018 15:31)  Weight (kg): 90.7 (04 Dec 2018 15:31)  BMI (kg/m2): 31.3 (04 Dec 2018 15:31)  BSA (m2): 2.02 (04 Dec 2018 15:31)    PE:    Vital Signs Last 24 Hrs  T(C): 36.9 (06 Dec 2018 04:56), Max: 36.9 (05 Dec 2018 21:02)  T(F): 98.4 (06 Dec 2018 04:56), Max: 98.5 (05 Dec 2018 21:02)  HR: 71 (06 Dec 2018 04:56) (66 - 74)  BP: 140/66 (06 Dec 2018 04:56) (124/58 - 140/66)  RR: 18 (06 Dec 2018 04:56) (18 - 18)  SpO2: 97% (06 Dec 2018 04:56) (97% - 97%)    Gen: AOx3, NAD, non-toxic, pleasant  CV: S1+S2 normal, nontachycardic  Resp: Clear bilat, no resp distress, no crackles/wheezes  Abd: Soft, nontender, +BS  Ext: LLE significant swelling above knee; palpable DP pulse, limited exam--patient recently had dressing changed does not want changed again  : No Kn[place], no suprapubic tenderness  IV/Skin: No thrombophlebitis, LLE redness  Msk: No low back pain, no arthralgias, no joint swelling  Neuro: No sensory deficits, no motor deficits    LABS:                        9.1    4.74  )-----------( 236      ( 06 Dec 2018 07:10 )             28.3     12-06    140  |  104  |  15  ----------------------------<  97  4.1   |  26  |  0.76    Ca    8.8      06 Dec 2018 06:02  Phos  3.5     12-05  Mg     2.2     12-05    TPro  8.0  /  Alb  3.6  /  TBili  0.3  /  DBili  x   /  AST  9<L>  /  ALT  <5<L>  /  AlkPhos  160<H>  12-04    MICROBIOLOGY:    .Abscess Leg - Left  12-05-18   Culture in progress    .Blood Blood  12-04-18   No growth to date.    (otherwise reviewed)    RADIOLOGY:    12/4 USG:    FINDINGS:    There is normal compressibility of the left common femoral, femoral and   popliteal veins. The calf veins are not visualized due to marked   surrounding calf edema.    The contralateral common femoral vein is patent.    Doppler examination shows normal spontaneous and phasic flow.    IMPRESSION:     No evidence of left lower extremity deep venous thrombosis, although the   calf veins are not visualized due to surrounding edema.    12/4 CXR:    FINDINGS:    LUNGS AND PLEURA: The lungs are clear. No pleural effusion or   pneumothorax.    HEART AND MEDIASTINUM: Heart size is within normal limits. Left chest   wall dual-lead pacemaker.    SKELETON: Degenerative changes of the spine.      IMPRESSION:     Clear lungs.

## 2018-12-06 NOTE — PROGRESS NOTE ADULT - PROBLEM SELECTOR PLAN 1
s/p I and D in the ed.   The patient has been afebrile, WBC 6, He has been on outpatient doxycycline PO bid for months as he had cellulitis LLE a year ago.   - IV Vancomycin 1 gm q 12hrs, awaiting on wound c/s  - Vascular Sx consulted, Doppler LE negative DVT  - ID consult called, follow up recs.

## 2018-12-06 NOTE — PROGRESS NOTE ADULT - ASSESSMENT
73M Hx Afib, CAD on ASA, and chronic venous stasis presents with LLE cellulitis and wound drainage. Patient afebrile with normal WBC.    - C/w antibiotics per primary team.  - No vascular surgery interventions at this time  - F/u culture results   - C/w compression and elevation    Magno Rogers, PGY-2  Vascular Surgery [phone]

## 2018-12-06 NOTE — CONSULT NOTE ADULT - ASSESSMENT
73M Hx Afib, CAD on ASA, and chronic venous stasis presents with LLE cellulitis and wound drainage.  Patient afebrile with normal WBC.    - C/w antibiotics per primary team.  - No vascular surgery interventions at this time  - F/u culture results   - C/w compression and elevation  - Discussed with fellow  - To be discussed with attending    [facility]  [phone]
73 M with PMH CAD s/p stent (2007), s/p PPM (2012), hypothyroidism, multiple L knee replacements, chronic venous stasis of b/l LE (L>>R) sent by PMD for LLE wound.  LLE abscess s/p I+D by ED--purulent drainage/serosanguinous  Abscess culture pending  No leukocytosis, no fever  Appears clinically well/stable at bedside  Overall, LLE cellulitis, abscess, history recurrent cellulitis  - Vancomycin 1g q 12 (monitor trough; ordered trough before 4th dose)  - F/U abscess culture  - Wound care per surgery  - Final antibiotic plan depending on culture result    Reuben Guardado MD  Pager [phone]  After 5pm and on weekends call [phone]

## 2018-12-06 NOTE — PROGRESS NOTE ADULT - SUBJECTIVE AND OBJECTIVE BOX
Patient is a 73y old  Male who presents with a chief complaint of cellulitis/abscess LLE.    SUBJECTIVE / OVERNIGHT EVENTS:  Patient reports mild pain in LLE at the I & D site, manageable, afebrile  ROS other wise negative.   No events over night.       MEDICATIONS  (STANDING):  aspirin  chewable 81 milliGRAM(s) Oral daily  enoxaparin Injectable 40 milliGRAM(s) SubCutaneous every 24 hours  levothyroxine 137 MICROGram(s) Oral daily  simvastatin 40 milliGRAM(s) Oral at bedtime  vancomycin  IVPB 1000 milliGRAM(s) IV Intermittent every 12 hours    MEDICATIONS  (PRN):  acetaminophen   Tablet .. 650 milliGRAM(s) Oral every 6 hours PRN Temp greater or equal to 38C (100.4F), Mild Pain (1 - 3)  oxyCODONE    5 mG/acetaminophen 325 mG 1 Tablet(s) Oral every 6 hours PRN Severe Pain (7 - 10)    T(C): 36.9 (12-06-18 @ 04:56), Max: 36.9 (12-06-18 @ 04:56)  HR: 71 (12-06-18 @ 04:56) (71 - 71)  BP: 140/66 (12-06-18 @ 04:56) (140/66 - 140/66)  RR: 18 (12-06-18 @ 04:56) (18 - 18)  SpO2: 97% (12-06-18 @ 04:56) (97% - 97%)      I&O's Summary      PHYSICAL EXAM:-  GENERAL: NAD, well-developed  EYES: EOMI, conjunctiva and sclera clear  NECK: Supple, No JVD, no thyromegaly  CHEST/LUNG: Clear to auscultation bilaterally; No wheeze  HEART: Regular rate and rhythm; S1, S2 audible, No murmurs, rubs, or gallops  ABDOMEN: Soft, Nontender, Nondistended; Bowel sounds present  EXTREMITIES: LLE- bandage in distal LLE at I & D site, warm ext, pulses are palpable, No clubbing, cyanosis, 1+ edema LLE  NEURO: AAOx3, no focal deficit                            9.1    4.74  )-----------( 236      ( 06 Dec 2018 07:10 )             28.3           LIVER FUNCTIONS - ( 04 Dec 2018 20:35 )  Alb: 3.6 g/dL / Pro: 8.0 g/dL / ALK PHOS: 160 U/L / ALT: <5 U/L / AST: 9 U/L / GGT: x           PT/INR - ( 04 Dec 2018 20:35 )   PT: 13.8 sec;   INR: 1.19 ratio         PTT - ( 04 Dec 2018 20:35 )  PTT:33.7 sec  140|104|15<97  4.1|26|0.76  8.8,--,--  12-06 @ 06:02        RADIOLOGY & ADDITIONAL TESTS:    Imaging Personally Reviewed: CXR  Care Discussed with Consultants/Other Providers: Vascular Sx

## 2018-12-06 NOTE — PROGRESS NOTE ADULT - SUBJECTIVE AND OBJECTIVE BOX
Surgery Progress Note    S: Patient seen and examined. No acute events overnight. Wound packing changed on rounds this AM.    O:  Vital Signs Last 24 Hrs  T(C): 36.9 (06 Dec 2018 04:56), Max: 36.9 (05 Dec 2018 21:02)  T(F): 98.4 (06 Dec 2018 04:56), Max: 98.5 (05 Dec 2018 21:02)  HR: 71 (06 Dec 2018 04:56) (66 - 74)  BP: 140/66 (06 Dec 2018 04:56) (115/47 - 140/66)  BP(mean): --  RR: 18 (06 Dec 2018 04:56) (17 - 18)  SpO2: 97% (06 Dec 2018 04:56) (96% - 97%)    I&O's Detail    05 Dec 2018 07:01  -  06 Dec 2018 07:00  --------------------------------------------------------  IN:    IV PiggyBack: 250 mL    Oral Fluid: 360 mL  Total IN: 610 mL    OUT:    Voided: 2100 mL  Total OUT: 2100 mL    Total NET: -1490 mL          MEDICATIONS  (STANDING):  aspirin  chewable 81 milliGRAM(s) Oral daily  enoxaparin Injectable 40 milliGRAM(s) SubCutaneous every 24 hours  levothyroxine 137 MICROGram(s) Oral daily  simvastatin 40 milliGRAM(s) Oral at bedtime  vancomycin  IVPB 1000 milliGRAM(s) IV Intermittent every 12 hours    MEDICATIONS  (PRN):  acetaminophen   Tablet .. 650 milliGRAM(s) Oral every 6 hours PRN Temp greater or equal to 38C (100.4F), Mild Pain (1 - 3)  oxyCODONE    5 mG/acetaminophen 325 mG 1 Tablet(s) Oral every 6 hours PRN Severe Pain (7 - 10)                            9.1    4.74  )-----------( 236      ( 06 Dec 2018 07:10 )             28.3       12-06    140  |  104  |  15  ----------------------------<  97  4.1   |  26  |  0.76    Ca    8.8      06 Dec 2018 06:02  Phos  3.5     12-05  Mg     2.2     12-05    TPro  8.0  /  Alb  3.6  /  TBili  0.3  /  DBili  x   /  AST  9<L>  /  ALT  <5<L>  /  AlkPhos  160<H>  12-04      Physical Exam:  Gen: Laying in bed, NAD  Resp: Unlabored breathing  Abd: soft, NTND  Ext: WWP. Small (~0.5cm) wound along medial aspect of lower left leg with some purulent drainage noted. No surrounding erythema. LLE notably swollen compared to R. Palpable DP/PT pulses b/l.   Skin: No rashes

## 2018-12-07 ENCOUNTER — TRANSCRIPTION ENCOUNTER (OUTPATIENT)
Age: 73
End: 2018-12-07

## 2018-12-07 LAB
ANION GAP SERPL CALC-SCNC: 12 MMOL/L — SIGNIFICANT CHANGE UP (ref 5–17)
BASOPHILS # BLD AUTO: 0.02 K/UL — SIGNIFICANT CHANGE UP (ref 0–0.2)
BASOPHILS NFR BLD AUTO: 0.4 % — SIGNIFICANT CHANGE UP (ref 0–2)
BUN SERPL-MCNC: 16 MG/DL — SIGNIFICANT CHANGE UP (ref 7–23)
CALCIUM SERPL-MCNC: 9 MG/DL — SIGNIFICANT CHANGE UP (ref 8.4–10.5)
CHLORIDE SERPL-SCNC: 103 MMOL/L — SIGNIFICANT CHANGE UP (ref 96–108)
CO2 SERPL-SCNC: 25 MMOL/L — SIGNIFICANT CHANGE UP (ref 22–31)
CREAT SERPL-MCNC: 0.84 MG/DL — SIGNIFICANT CHANGE UP (ref 0.5–1.3)
EOSINOPHIL # BLD AUTO: 0.3 K/UL — SIGNIFICANT CHANGE UP (ref 0–0.5)
EOSINOPHIL NFR BLD AUTO: 5.8 % — SIGNIFICANT CHANGE UP (ref 0–6)
GLUCOSE SERPL-MCNC: 98 MG/DL — SIGNIFICANT CHANGE UP (ref 70–99)
HCT VFR BLD CALC: 28.9 % — LOW (ref 39–50)
HGB BLD-MCNC: 9.1 G/DL — LOW (ref 13–17)
IMM GRANULOCYTES NFR BLD AUTO: 0.2 % — SIGNIFICANT CHANGE UP (ref 0–1.5)
LYMPHOCYTES # BLD AUTO: 1.46 K/UL — SIGNIFICANT CHANGE UP (ref 1–3.3)
LYMPHOCYTES # BLD AUTO: 28.2 % — SIGNIFICANT CHANGE UP (ref 13–44)
MCHC RBC-ENTMCNC: 25.3 PG — LOW (ref 27–34)
MCHC RBC-ENTMCNC: 31.5 GM/DL — LOW (ref 32–36)
MCV RBC AUTO: 80.3 FL — SIGNIFICANT CHANGE UP (ref 80–100)
MONOCYTES # BLD AUTO: 0.62 K/UL — SIGNIFICANT CHANGE UP (ref 0–0.9)
MONOCYTES NFR BLD AUTO: 12 % — SIGNIFICANT CHANGE UP (ref 2–14)
NEUTROPHILS # BLD AUTO: 2.76 K/UL — SIGNIFICANT CHANGE UP (ref 1.8–7.4)
NEUTROPHILS NFR BLD AUTO: 53.4 % — SIGNIFICANT CHANGE UP (ref 43–77)
PLATELET # BLD AUTO: 247 K/UL — SIGNIFICANT CHANGE UP (ref 150–400)
POTASSIUM SERPL-MCNC: 4 MMOL/L — SIGNIFICANT CHANGE UP (ref 3.5–5.3)
POTASSIUM SERPL-SCNC: 4 MMOL/L — SIGNIFICANT CHANGE UP (ref 3.5–5.3)
RBC # BLD: 3.6 M/UL — LOW (ref 4.2–5.8)
RBC # FLD: 16.8 % — HIGH (ref 10.3–14.5)
SODIUM SERPL-SCNC: 140 MMOL/L — SIGNIFICANT CHANGE UP (ref 135–145)
WBC # BLD: 5.17 K/UL — SIGNIFICANT CHANGE UP (ref 3.8–10.5)
WBC # FLD AUTO: 5.17 K/UL — SIGNIFICANT CHANGE UP (ref 3.8–10.5)

## 2018-12-07 PROCEDURE — 99232 SBSQ HOSP IP/OBS MODERATE 35: CPT

## 2018-12-07 PROCEDURE — 99233 SBSQ HOSP IP/OBS HIGH 50: CPT

## 2018-12-07 RX ORDER — CEFUROXIME AXETIL 250 MG
500 TABLET ORAL EVERY 12 HOURS
Qty: 0 | Refills: 0 | Status: DISCONTINUED | OUTPATIENT
Start: 2018-12-07 | End: 2018-12-08

## 2018-12-07 RX ADMIN — Medication 500 MILLIGRAM(S): at 17:55

## 2018-12-07 RX ADMIN — SIMVASTATIN 40 MILLIGRAM(S): 20 TABLET, FILM COATED ORAL at 21:12

## 2018-12-07 RX ADMIN — Medication 250 MILLIGRAM(S): at 05:18

## 2018-12-07 RX ADMIN — Medication 81 MILLIGRAM(S): at 11:50

## 2018-12-07 RX ADMIN — ENOXAPARIN SODIUM 40 MILLIGRAM(S): 100 INJECTION SUBCUTANEOUS at 05:18

## 2018-12-07 RX ADMIN — Medication 137 MICROGRAM(S): at 05:18

## 2018-12-07 RX ADMIN — Medication 2 TABLET(S): at 17:55

## 2018-12-07 NOTE — OCCUPATIONAL THERAPY INITIAL EVALUATION ADULT - PRECAUTIONS/LIMITATIONS, REHAB EVAL
pt with baseline LLE swelling for years, worse over the past 4 months, but no significantly worse swelling noted in past 3 days along with lesion. Throughout the day, the blister became soft and fluctuant and pt decided to go to PMD, who sent pt to ED. Denies any fevers, chills, systemic symptoms, GI/ symptoms. Pt has had multiple episodes of cellulitis in LLE. Pt has had 4 L knee replacements, which all had to be revised/removed due to hardware infection from cellulitis;/fall precautions

## 2018-12-07 NOTE — OCCUPATIONAL THERAPY INITIAL EVALUATION ADULT - ADDITIONAL COMMENTS
LLE venous doppler 12/4- No evidence of left lower extremity deep venous thrombosis, although the calf veins are not visualized due to surrounding edema.

## 2018-12-07 NOTE — DISCHARGE NOTE ADULT - ADDITIONAL INSTRUCTIONS
Take your medication as directed   Followup with vascular sx  - Dr. Streeter  - call on Monday for an appt.   Followup with your PMD - Dr. Lainez   Wound packed with iodoform dressing then gauze on top and wrap with Kerlix / Ace wrap on LLE / Home care to start 12/9  Wound care as above  - Home care starting 12/9

## 2018-12-07 NOTE — DISCHARGE NOTE ADULT - CARE PLAN
Principal Discharge DX:	Cellulitis and abscess of left lower extremity  Goal:	s/p seen and followed by vascular sx / ID  - s/p course of iv abx - dc'd on oral / s/p I&D in ED  Assessment and plan of treatment:	Take your medication as directed   Followup with vascular sx  -0 Dr. Streeter  - call on Monday for an appt.   Followup with your PMD  Wound packed with iodoform dressing then gauze on top and wrap with Kerlix / Ace wrap on LLE  Secondary Diagnosis:	PVD (peripheral vascular disease)  Goal:	s/p followed by vascular sx. s/p dopplers  Assessment and plan of treatment:	As above  - followup with Dr. Streeter  Secondary Diagnosis:	Coronary artery disease involving native coronary artery of native heart without angina pectoris  Goal:	stable  Assessment and plan of treatment:	Continue with home meds / asa / statin  Secondary Diagnosis:	Hypothyroidism, unspecified type  Goal:	stable  Assessment and plan of treatment:	Continue with home meds / synthroid

## 2018-12-07 NOTE — PROGRESS NOTE ADULT - SUBJECTIVE AND OBJECTIVE BOX
CC: F/U for abscess    Saw/spoke to patient. No fevers, no chills. Overall unchanged. No new complaints.    Allergies  No Known Allergies    ANTIMICROBIALS:  trimethoprim  160 mG/sulfamethoxazole 800 mG 2 two times a day  vancomycin  IVPB 1000 every 12 hours    PE:    Vital Signs Last 24 Hrs  T(C): 36.6 (07 Dec 2018 09:10), Max: 36.7 (06 Dec 2018 21:41)  T(F): 97.9 (07 Dec 2018 09:10), Max: 98.1 (07 Dec 2018 05:12)  HR: 81 (07 Dec 2018 09:10) (61 - 81)  BP: 120/50 (07 Dec 2018 09:10) (114/65 - 135/57)  RR: 18 (07 Dec 2018 09:10) (18 - 18)  SpO2: 95% (07 Dec 2018 09:10) (95% - 96%)    Gen: AOx3, NAD, non-toxic, pleasant  CV: S1+S2 normal, nontachycardic  Resp: Clear bilat, no resp distress, no crackles/wheezes  Abd: Soft, nontender, +BS  Ext: LLE wound appears clean, packing in place, minimal surrounding erythema--improved    LABS:                        9.1    5.17  )-----------( 247      ( 07 Dec 2018 07:54 )             28.9     12-07    140  |  103  |  16  ----------------------------<  98  4.0   |  25  |  0.84    Ca    9.0      07 Dec 2018 06:18    MICROBIOLOGY:  Vancomycin Level, Trough: 8.3 ug/mL (12-06-18 @ 16:30)    .Abscess Leg - Left  12-05-18   Few Morganella morganii  --  Morganella morganii S Bactrim    .Blood Blood  12-04-18   No growth to date.    RADIOLOGY:    12/4 CXR:    FINDINGS:    LUNGS AND PLEURA: The lungs are clear. No pleural effusion or   pneumothorax.    HEART AND MEDIASTINUM: Heart size is within normal limits. Left chest   wall dual-lead pacemaker.    SKELETON: Degenerative changes of the spine.      IMPRESSION:     Clear lungs.

## 2018-12-07 NOTE — PHYSICAL THERAPY INITIAL EVALUATION ADULT - GAIT DEVIATIONS NOTED, PT EVAL
able to walk and talk, no loss of balance, no c/o pain. pt with dec'd knee ROM with ambulation (is baseline)

## 2018-12-07 NOTE — PHYSICAL THERAPY INITIAL EVALUATION ADULT - REFERRAL TO ANOTHER SERVICE NEEDED, PT EVAL
occupational therapy/only deficit noted was pt having difficulty donning/doffing L sock 2/2 ace bandage wraps/knee fusion. pt may benefit from training in use of hip kit

## 2018-12-07 NOTE — CHART NOTE - NSCHARTNOTEFT_GEN_A_CORE
Upon, discharge please follow up with Dr. Streeter within 1-2 weeks following discharge. LLE small open wound with packing to be removed during follow up appointment. Until follow up appointment, please keep bilateral lower extremities elevated and with compressive ACE bandages. DC on antibiotics as per infectious disease recommendations with appropriate follow up    Vascular Surgery [phone] Upon, discharge please follow up with Dr. Streeter within 1-2 weeks following discharge. Packing to be removed from wound in 3-4 days.  Until follow up appointment, please keep bilateral lower extremities elevated and with compressive ACE bandages. DC on antibiotics as per infectious disease recommendations with appropriate follow up    Vascular Surgery [phone]

## 2018-12-07 NOTE — DISCHARGE NOTE ADULT - MEDICATION SUMMARY - MEDICATIONS TO TAKE
I will START or STAY ON the medications listed below when I get home from the hospital:    aspirin 81 mg oral tablet, chewable  -- 1 tab(s) by mouth once a day  -- Indication: For Heart     acetaminophen 325 mg oral tablet  -- 2 tab(s) by mouth every 6 hours, As needed, Temp greater or equal to 38C (100.4F), Mild Pain (1 - 3)  -- Indication: For Pain    simvastatin 40 mg oral tablet  -- 1 tab(s) by mouth once a day (at bedtime)  -- Indication: For Cholesterol     ezetimibe-simvastatin 10 mg-40 mg oral tablet  -- 1 tab(s) by mouth once a day  -- Indication: For Cholesterol     doxycycline hyclate 100 mg oral capsule  -- 1 cap(s) by mouth 2 times a day  to continue after other abx completed in 6 more days on 12/13   -- Indication: For Abx    cefuroxime 500 mg oral tablet  -- 1 tab(s) by mouth every 12 hours  -- Indication: For Abx    sulfamethoxazole-trimethoprim 800 mg-160 mg oral tablet  -- 2 tab(s) by mouth 2 times a day  -- Indication: For Abx    Synthroid 137 mcg (0.137 mg) oral tablet  -- 1 tab(s) by mouth once a day  -- Indication: For thyroid I will START or STAY ON the medications listed below when I get home from the hospital:    aspirin 81 mg oral tablet, chewable  -- 1 tab(s) by mouth once a day  -- Indication: For Heart     acetaminophen 325 mg oral tablet  -- 2 tab(s) by mouth every 6 hours, As needed, Temp greater or equal to 38C (100.4F), Mild Pain (1 - 3)  -- Indication: For Pain    ezetimibe-simvastatin 10 mg-40 mg oral tablet  -- 1 tab(s) by mouth once a day  -- Indication: For Cholesterol     doxycycline hyclate 100 mg oral capsule  -- 1 cap(s) by mouth 2 times a day  to continue after other abx completed in 6 more days on 12/13   -- Indication: For Abx    cefuroxime 500 mg oral tablet  -- 1 tab(s) by mouth every 12 hours  -- Indication: For Abx    sulfamethoxazole-trimethoprim 800 mg-160 mg oral tablet  -- 2 tab(s) by mouth 2 times a day  -- Indication: For Abx    Synthroid 137 mcg (0.137 mg) oral tablet  -- 1 tab(s) by mouth once a day  -- Indication: For thyroid

## 2018-12-07 NOTE — PROGRESS NOTE ADULT - PROBLEM SELECTOR PLAN 1
s/p I and D in the ed.   The patient has been afebrile, WBC 6, He has been on outpatient doxycycline PO bid for months as he had cellulitis LLE a year ago.   - s/p Vanco, wound cultures positive for Morganella, change to oral antibiotics- Bactrim and Ceftin.   - Vascular Sx consulted, Doppler LE negative DVT  - ID follow up appreciated.   - D/C planning am tomorrow on oral antibiotics.  -Patient to follow up with vascular for wound care.

## 2018-12-07 NOTE — PHYSICAL THERAPY INITIAL EVALUATION ADULT - PERTINENT HX OF CURRENT PROBLEM, REHAB EVAL
74 y/o male with PMH CAD s/p stent (2007), s/p PPM (2012), hypothyroidism, multiple L knee replacements, chronic venous stasis of b/l LE (L>>R), cellulitis in L leg a year ago on Doxycycline PO sent by PMD for LLE wound, found with LLE abscess s/p I & D in ER.

## 2018-12-07 NOTE — DISCHARGE NOTE ADULT - HOSPITAL COURSE
This is a 72 yo M with PMH CAD s/p stent (2007), s/p PPM (2012), hypothyroidism, multiple L knee replacements, chronic venous stasis of b/l LE (L>>R), cellulitis in left leg a year ago on Doxycycline PO; sent by the PMD for LLE wound, found with LLE abscess and pt. had an I & D in ER. found to be afebrile with WBC 6.3. For cellulitis and abscess of left lower extremity as above s/p I & D in the ed., remaining afebrile with WBC 6. Pt. had been on outpatient doxycycline PO bid for months adding he had cellulitis LLE a year ago. Currently, .started on IV Vancomycin 1 gm q 12hrs, wound c/s sent and will await result. Seen and followed by vascular sx. LE negative for DVT. Seen and followed by ID as well. For PVD (peripheral vascular disease) pt. has chronic LE venous stasis and LLE elevated, will c/w ASA, statin and vascular Sx following. For CAD, currently pt. has  no c/o chest pain, SOB, and EKG with no ischemia. For Hypothyroidism, TSH is normal will c/w home dose synthroid. Vascular sx following for wound care and with regard to followup. pt will remains with current dressing with compression dressing and advised elevation and will f/u with Dr. Streeter. Pt. discharged 12/8 and has an appt. to see Vascular MD on Monday This is a 72 yo M with PMH CAD s/p stent (2007), s/p PPM (2012), hypothyroidism, multiple L knee replacements, chronic venous stasis of b/l LE (L>>R), cellulitis in left leg a year ago on Doxycycline PO; sent by the PMD for LLE wound, found with LLE abscess and pt. had an I & D in ER. found to be afebrile with WBC 6.3. For cellulitis and abscess of left lower extremity as above s/p I & D in the ed., remaining afebrile with WBC 6. Pt. had been on outpatient doxycycline PO bid for months adding he had cellulitis LLE a year ago. Currently, .started on IV Vancomycin 1 gm q 12hrs, wound c/s sent and will await result. Seen and followed by vascular sx. LE negative for DVT. Seen and followed by ID as well. For PVD (peripheral vascular disease) pt. has chronic LE venous stasis and LLE elevated, will c/w ASA, statin and vascular Sx following. For CAD, currently pt. has  no c/o chest pain, SOB, and EKG with no ischemia. For Hypothyroidism, TSH is normal will c/w home dose synthroid. Vascular sx following for wound care and with regard to followup. pt will remains with current dressing with compression dressing and advised elevation and will f/u with Dr. Streeter. Pt. discharged 12/8 and will f/u with Vascular MD Dr. Streeter next week. This is a 74 yo M with PMH CAD s/p stent (2007), s/p PPM (2012), hypothyroidism, multiple L knee replacements, chronic venous stasis of b/l LE (L>>R), cellulitis in left leg a year ago on Doxycycline PO; sent by the PMD for LLE wound, found with LLE abscess and pt. had an I & D in ER. found to be afebrile with WBC 6.3. For cellulitis and abscess of left lower extremity as above s/p I & D in the ed., remaining afebrile with WBC 6. Pt. had been on outpatient doxycycline PO bid for months adding he had cellulitis LLE a year ago. Currently, .started on IV Vancomycin 1 gm q 12hrs, wound c/s sent and will await result. Seen and followed by vascular sx. LE negative for DVT. Seen and followed by ID as well. For PVD (peripheral vascular disease) pt. has chronic LE venous stasis and LLE elevated, will c/w ASA, statin and vascular Sx following. For CAD, currently pt. has  no c/o chest pain, SOB, and EKG with no ischemia. For Hypothyroidism, TSH is normal will c/w home dose synthroid. Vascular sx following for wound care and with regard to followup. pt will remains with current dressing with compression dressing and advised elevation and will f/u with Dr. Streeter. Pt. discharged 12/8 and will f/u with Vascular MD Dr. Streeter next week.     Patient discharged home.

## 2018-12-07 NOTE — PROGRESS NOTE ADULT - ASSESSMENT
73 M with PMH CAD s/p stent (2007), s/p PPM (2012), hypothyroidism, multiple L knee replacements, chronic venous stasis of b/l LE (L>>R) sent by PMD for LLE wound.  LLE abscess s/p I+D by ED--purulent drainage/serosanguinous  Abscess culture pending  No leukocytosis, no fever  Appears clinically well/stable at bedside  Culture with Morganella S Bactrim  Overall, LLE cellulitis, abscess, history recurrent cellulitis  - Bactrim DS 2 tabs q 12  - Ceftin 500mg q 12  - DC Vanco  - Plan to continue Bactrim/Ceftin for 7 more days, afterwards would resume suppressive doxycyline regimen as per patient's outside physicians  - On DC, follow up with surgeon at S regarding role for continued suppressive abx    Reuben Guardado MD  Pager [phone]  After 5pm and on weekends call [phone]

## 2018-12-07 NOTE — DISCHARGE NOTE ADULT - PATIENT PORTAL LINK FT
You can access the [facility] Patient Portal, offered by [facility], by registering with the following website: [email or web address]

## 2018-12-07 NOTE — PHYSICAL THERAPY INITIAL EVALUATION ADULT - ADDITIONAL COMMENTS
Pt states he lives in an apartment with 3 steps to enter building and elevator access within. Pt states prior to admission being independent with all functional mobility and ADLs. Pt states he was predominantly ambulatory using straight cane, pt uses RW at times. Pt states he has a tub bench and grab bars.

## 2018-12-07 NOTE — DISCHARGE NOTE ADULT - CARE PROVIDER_API CALL
Bonita Streeter), Surgery; Surgical Critical Care; Vascular Surgery  [address] [facility]  [address]  [place] [address]  Phone: [phone]  Fax: [phone] Bonita Streeter), Surgery; Surgical Critical Care; Vascular Surgery  [address] [facility]  [address]  [place] [address]  Phone: [phone]  Fax: [phone]    Roni Lainez), Cardiovascular Disease; Internal Medicine  [address] [place] [id number]  Phone: [phone]  Fax: [phone]

## 2018-12-07 NOTE — DISCHARGE NOTE ADULT - PLAN OF CARE
s/p seen and followed by vascular sx / ID  - s/p course of iv abx - dc'd on oral / s/p I&D in ED Take your medication as directed   Followup with vascular sx  -0 Dr. Streeter  - call on Monday for an appt.   Followup with your PMD  Wound packed with iodoform dressing then gauze on top and wrap with Kerlix / Ace wrap on LLE s/p followed by vascular sx. s/p dopplers As above  - followup with Dr. Streeter stable Continue with home meds / asa / statin Continue with home meds / synthroid

## 2018-12-07 NOTE — PROGRESS NOTE ADULT - SUBJECTIVE AND OBJECTIVE BOX
Patient is  seen  His  LLE  is  clean  Area  of  Incision  and  Drainage  is not  surrounded by cellulitis  or  fascitis  Continue  antibiotics  based on  cultures  and local  wound  care  He can be seen  as outpatient  if necessary  he  is a t risk  of  joint  infections  as well

## 2018-12-07 NOTE — PHYSICAL THERAPY INITIAL EVALUATION ADULT - PLANNED THERAPY INTERVENTIONS, PT EVAL
Home with no skilled PT needs. Pt presents at previous level of function. Pt to be discharge from skilled PT services at this time, pt to be left on ambulation aide program to maintain independence. Pt aware and in agreement with discharge recommendation. Please reconsult as appropriate/if status changes.

## 2018-12-07 NOTE — OCCUPATIONAL THERAPY INITIAL EVALUATION ADULT - LIVES WITH, PROFILE
alone/Pt lives alone in an apt. 3 steps to enter with a handrail No stairs inside. Owns straight cane. +Tub with curtain

## 2018-12-07 NOTE — DISCHARGE NOTE ADULT - SECONDARY DIAGNOSIS.
PVD (peripheral vascular disease) Coronary artery disease involving native coronary artery of native heart without angina pectoris Hypothyroidism, unspecified type

## 2018-12-07 NOTE — PHYSICAL THERAPY INITIAL EVALUATION ADULT - ACTIVE RANGE OF MOTION EXAMINATION, REHAB EVAL
Right LE Active ROM was WFL (within functional limits)/bilateral upper extremity Active ROM was WFL (within functional limits)/h/o L knee fusion, no other deficits noted

## 2018-12-07 NOTE — DISCHARGE NOTE ADULT - CARE PROVIDERS DIRECT ADDRESSES
,[email or web address],DirectAddress_Unknown

## 2018-12-07 NOTE — PROGRESS NOTE ADULT - SUBJECTIVE AND OBJECTIVE BOX
Patient is a 73y old  Male who presents with a chief complaint of cellulitis/abscess LLE.    SUBJECTIVE / OVERNIGHT EVENTS:  Patient feels ok, no new complaints.   ROS other wise negative.   No events over night.     T(C): 36.8 (12-07-18 @ 13:50), Max: 36.8 (12-07-18 @ 13:50)  HR: 59 (12-07-18 @ 16:06) (58 - 81)  BP: 129/61 (12-07-18 @ 16:06) (120/50 - 137/69)  RR: 18 (12-07-18 @ 13:50) (18 - 18)  SpO2: 97% (12-07-18 @ 16:06) (95% - 98%)    MEDICATIONS  (STANDING):  aspirin  chewable 81 milliGRAM(s) Oral daily  cefuroxime   Tablet 500 milliGRAM(s) Oral every 12 hours  enoxaparin Injectable 40 milliGRAM(s) SubCutaneous every 24 hours  levothyroxine 137 MICROGram(s) Oral daily  simvastatin 40 milliGRAM(s) Oral at bedtime  trimethoprim  160 mG/sulfamethoxazole 800 mG 2 Tablet(s) Oral two times a day    MEDICATIONS  (PRN):  acetaminophen   Tablet .. 650 milliGRAM(s) Oral every 6 hours PRN Temp greater or equal to 38C (100.4F), Mild Pain (1 - 3)  oxyCODONE    5 mG/acetaminophen 325 mG 1 Tablet(s) Oral every 6 hours PRN Severe Pain (7 - 10)    PHYSICAL EXAM:-  GENERAL: NAD, well-developed  EYES: EOMI, conjunctiva and sclera clear  NECK: Supple, No JVD, no thyromegaly  CHEST/LUNG: Clear to auscultation bilaterally; No wheeze  HEART: Regular rate and rhythm; S1, S2 audible, No murmurs, rubs, or gallops  ABDOMEN: Soft, Nontender, Nondistended; Bowel sounds present  EXTREMITIES: LLE- bandage in distal LLE at I & D site, warm ext, pulses are palpable, No clubbing, cyanosis, 1+ edema LLE  NEURO: AAOx3, no focal deficit                                           9.1    5.17  )-----------( 247      ( 07 Dec 2018 07:54 )             28.9               140|103|16<98  4.0|25|0.84  9.0,--,--  12-07 @ 06:18      RADIOLOGY & ADDITIONAL TESTS:    Imaging Personally Reviewed: CXR  Care Discussed with Consultants/Other Providers: Vascular Sx

## 2018-12-07 NOTE — PHYSICAL THERAPY INITIAL EVALUATION ADULT - GENERAL OBSERVATIONS, REHAB EVAL
Pt nupur 45 min eval well. Pt flagged by PIERRE Brown at pt request. Pt a/w cellulitis/abcess, pt with h/o L knee fusion. Pt rec'd in bed playing cards and NAD. Pt ambulated around unit earlier with nursing staff with walker as per pt, pt asking to use cane.

## 2018-12-07 NOTE — OCCUPATIONAL THERAPY INITIAL EVALUATION ADULT - PERTINENT HX OF CURRENT PROBLEM, REHAB EVAL
73M sent by PMD for LLE wound. Per pt, he started noticing a small flat area of redness in his LLE 3d PTA, denies any trauama; over the next 3 days, the area became larger and started to rise. On day of admission, it was a large hard blister; he noted some surrounding redness and warmth, no pain, able to ambulate as normally;

## 2018-12-08 LAB
HCT VFR BLD CALC: 32.3 % — LOW (ref 39–50)
HGB BLD-MCNC: 10.5 G/DL — LOW (ref 13–17)
MCHC RBC-ENTMCNC: 26.5 PG — LOW (ref 27–34)
MCHC RBC-ENTMCNC: 32.4 GM/DL — SIGNIFICANT CHANGE UP (ref 32–36)
MCV RBC AUTO: 81.8 FL — SIGNIFICANT CHANGE UP (ref 80–100)
PLATELET # BLD AUTO: 247 K/UL — SIGNIFICANT CHANGE UP (ref 150–400)
RBC # BLD: 3.95 M/UL — LOW (ref 4.2–5.8)
RBC # FLD: 15.8 % — HIGH (ref 10.3–14.5)
WBC # BLD: 5.6 K/UL — SIGNIFICANT CHANGE UP (ref 3.8–10.5)
WBC # FLD AUTO: 5.6 K/UL — SIGNIFICANT CHANGE UP (ref 3.8–10.5)

## 2018-12-08 PROCEDURE — 82803 BLOOD GASES ANY COMBINATION: CPT

## 2018-12-08 PROCEDURE — 80053 COMPREHEN METABOLIC PANEL: CPT

## 2018-12-08 PROCEDURE — 85014 HEMATOCRIT: CPT

## 2018-12-08 PROCEDURE — 80048 BASIC METABOLIC PNL TOTAL CA: CPT

## 2018-12-08 PROCEDURE — 84132 ASSAY OF SERUM POTASSIUM: CPT

## 2018-12-08 PROCEDURE — 83605 ASSAY OF LACTIC ACID: CPT

## 2018-12-08 PROCEDURE — 93971 EXTREMITY STUDY: CPT

## 2018-12-08 PROCEDURE — 71046 X-RAY EXAM CHEST 2 VIEWS: CPT

## 2018-12-08 PROCEDURE — 85652 RBC SED RATE AUTOMATED: CPT

## 2018-12-08 PROCEDURE — 99285 EMERGENCY DEPT VISIT HI MDM: CPT | Mod: 25

## 2018-12-08 PROCEDURE — 87205 SMEAR GRAM STAIN: CPT

## 2018-12-08 PROCEDURE — 84295 ASSAY OF SERUM SODIUM: CPT

## 2018-12-08 PROCEDURE — 97166 OT EVAL MOD COMPLEX 45 MIN: CPT

## 2018-12-08 PROCEDURE — 84443 ASSAY THYROID STIM HORMONE: CPT

## 2018-12-08 PROCEDURE — 85730 THROMBOPLASTIN TIME PARTIAL: CPT

## 2018-12-08 PROCEDURE — 10061 I&D ABSCESS COMP/MULTIPLE: CPT

## 2018-12-08 PROCEDURE — 82330 ASSAY OF CALCIUM: CPT

## 2018-12-08 PROCEDURE — 87070 CULTURE OTHR SPECIMN AEROBIC: CPT

## 2018-12-08 PROCEDURE — 99239 HOSP IP/OBS DSCHRG MGMT >30: CPT

## 2018-12-08 PROCEDURE — 87186 SC STD MICRODIL/AGAR DIL: CPT

## 2018-12-08 PROCEDURE — 87040 BLOOD CULTURE FOR BACTERIA: CPT

## 2018-12-08 PROCEDURE — 82435 ASSAY OF BLOOD CHLORIDE: CPT

## 2018-12-08 PROCEDURE — 84100 ASSAY OF PHOSPHORUS: CPT

## 2018-12-08 PROCEDURE — 80202 ASSAY OF VANCOMYCIN: CPT

## 2018-12-08 PROCEDURE — 97161 PT EVAL LOW COMPLEX 20 MIN: CPT

## 2018-12-08 PROCEDURE — 82947 ASSAY GLUCOSE BLOOD QUANT: CPT

## 2018-12-08 PROCEDURE — 83735 ASSAY OF MAGNESIUM: CPT

## 2018-12-08 PROCEDURE — 93005 ELECTROCARDIOGRAM TRACING: CPT

## 2018-12-08 PROCEDURE — 96374 THER/PROPH/DIAG INJ IV PUSH: CPT | Mod: XU

## 2018-12-08 PROCEDURE — 85027 COMPLETE CBC AUTOMATED: CPT

## 2018-12-08 PROCEDURE — 85610 PROTHROMBIN TIME: CPT

## 2018-12-08 RX ORDER — ACETAMINOPHEN 500 MG
2 TABLET ORAL
Qty: 0 | Refills: 0 | DISCHARGE
Start: 2018-12-08

## 2018-12-08 RX ORDER — CEFUROXIME AXETIL 250 MG
1 TABLET ORAL
Qty: 12 | Refills: 0
Start: 2018-12-08 | End: 2018-12-13

## 2018-12-08 RX ORDER — SIMVASTATIN 20 MG/1
1 TABLET, FILM COATED ORAL
Qty: 0 | Refills: 0 | COMMUNITY
Start: 2018-12-08

## 2018-12-08 RX ADMIN — Medication 500 MILLIGRAM(S): at 05:19

## 2018-12-08 RX ADMIN — Medication 137 MICROGRAM(S): at 05:19

## 2018-12-08 RX ADMIN — Medication 81 MILLIGRAM(S): at 12:34

## 2018-12-08 RX ADMIN — ENOXAPARIN SODIUM 40 MILLIGRAM(S): 100 INJECTION SUBCUTANEOUS at 05:19

## 2018-12-08 RX ADMIN — Medication 2 TABLET(S): at 05:19

## 2018-12-08 NOTE — PROGRESS NOTE ADULT - PROBLEM SELECTOR PROBLEM 1
Cellulitis and abscess of left lower extremity

## 2018-12-08 NOTE — PROGRESS NOTE ADULT - PROVIDER SPECIALTY LIST ADULT
Infectious Disease
Internal Medicine
Vascular Surgery
Vascular Surgery
Internal Medicine

## 2018-12-08 NOTE — PROGRESS NOTE ADULT - PROBLEM SELECTOR PLAN 4
- c/w home dose synthroid
TSH is normal. c/w home dose synthroid

## 2018-12-08 NOTE — PROGRESS NOTE ADULT - SUBJECTIVE AND OBJECTIVE BOX
No overnight events.  Pt is feeling better, states that left lower extremity is improved in color, warmth and girth.  He feels ready to leave and had a few questions about homecare which were answered.  CM aware and arranging home care with wound care orders per vascular.  Denies abd pain but would like to move bowels.  Discussed warning signs for obstruction, he is currently pain free and passing gas.  He will take stool softener at home.      Denies fevers or chills.  Denies visual changes.  Denies SOB, cough, wheezing.  Denies CP or palpitations. Denies abdominal pain, N/V, diarrhea or constipation.  Denies dysuria. Denies lightheadedness, weakness.  Denies rashes.     VITALS  T(C): 36.5 (12-08-18 @ 04:57), Max: 37.3 (12-07-18 @ 20:59)  HR: 61 (12-08-18 @ 04:57) (58 - 65)  BP: 122/55 (12-08-18 @ 04:57) (122/55 - 137/69)  RR: 18 (12-08-18 @ 04:57) (18 - 18)  SpO2: 97% (12-08-18 @ 04:57) (92% - 98%)  Wt(kg): --    PE:  Gen - NAD, A&Ox3, pleasant  ENT - OP clear  CV - RRR  Lung - clear b/l, no wheezes/rhonchi  Abd - soft, NTND, +BS  Ext - no LE edema  Neuro - nonfocal  Skin - warm, dry  Psych - normal affect    LABS                        10.5   5.6   )-----------( 247      ( 08 Dec 2018 06:36 )             32.3     12-07    140  |  103  |  16  ----------------------------<  98  4.0   |  25  |  0.84    Ca    9.0      07 Dec 2018 06:18      Consultant(s) Notes Reviewed:  Vascular No overnight events.  Pt is feeling better, states that left lower extremity is improved in color, warmth and girth.  He feels ready to leave and had a few questions about homecare which were answered.  CM aware and arranging home care with wound care orders per vascular.  Denies abd pain but would like to move bowels.  Discussed warning signs for obstruction, he is currently pain free and passing gas.  He will take stool softener at home.      Denies fevers or chills.  Denies visual changes.  Denies SOB, cough, wheezing.  Denies CP or palpitations. Denies abdominal pain, N/V, diarrhea or constipation.  Denies dysuria. Denies lightheadedness, weakness.  Denies rashes.     VITALS  T(C): 36.5 (12-08-18 @ 04:57), Max: 37.3 (12-07-18 @ 20:59)  HR: 61 (12-08-18 @ 04:57) (58 - 65)  BP: 122/55 (12-08-18 @ 04:57) (122/55 - 137/69)  RR: 18 (12-08-18 @ 04:57) (18 - 18)  SpO2: 97% (12-08-18 @ 04:57) (92% - 98%)  Wt(kg): --    PE:  Gen - NAD, A&Ox3, pleasant  ENT - OP clear  CV - RRR  Lung - clear b/l, no wheezes/rhonchi  Abd - soft, NTND, +BS  Ext - b/l ace bandages around both shins, DP/PT pulses intact bilaterall, both feet warm, ++LLE edema, no erythema  Neuro - nonfocal  Skin - warm, dry  Psych - normal affect    LABS                        10.5   5.6   )-----------( 247      ( 08 Dec 2018 06:36 )             32.3     12-07    140  |  103  |  16  ----------------------------<  98  4.0   |  25  |  0.84    Ca    9.0      07 Dec 2018 06:18      Consultant(s) Notes Reviewed:  Vascular

## 2018-12-08 NOTE — PROGRESS NOTE ADULT - ATTENDING COMMENTS
55 minutes spent on discharge planning and patient care    Savannah Luo MD  [phone] pager  [phone] office  Internal Medicine, Hospitalist

## 2018-12-08 NOTE — PROGRESS NOTE ADULT - ASSESSMENT
73M h/o CAD s/p stent (2007), s/p PPM (2012), hypothyroidism, multiple L knee replacements, chronic venous stasis of b/l LE (L>>R), cellulitis LLE on Doxycycline PO suppression therapy sent by PMD for LLE wound, found with LLE abscess s/p I & D in ER. Afebrile, WBC 6.3 73M h/o CAD s/p stent (2007), s/p PPM (2012), hypothyroidism, multiple L knee replacements, chronic venous stasis of b/l LE (L>>R), cellulitis LLE on Doxycycline PO suppression therapy sent by PMD for LLE wound, found with LLE abscess s/p I & D in ER

## 2018-12-08 NOTE — PROGRESS NOTE ADULT - PROBLEM SELECTOR PLAN 5
- lovenox for DVT ppx   - fall precautions
Lovenox for DVT ppx   fall precautions

## 2018-12-08 NOTE — PROGRESS NOTE ADULT - PROBLEM SELECTOR PROBLEM 3
Coronary artery disease involving native coronary artery of native heart without angina pectoris

## 2018-12-08 NOTE — PROGRESS NOTE ADULT - REASON FOR ADMISSION
cellulitis/abscess
cellulitis/abscess LLE

## 2018-12-08 NOTE — PROGRESS NOTE ADULT - PROBLEM SELECTOR PLAN 3
- c/w statin, ASA
No c/o chest pain, SOB, EKG with no ischemia   - c/w statin, ASA

## 2018-12-08 NOTE — PROGRESS NOTE ADULT - PROBLEM SELECTOR PLAN 2
- Chronic LE venous stasis  - elevation LLE, c/w ASA, statin  - Vascular Sx consulted.
Chronic LE venous stasis  - elevation LLE, c/w ASA, statin  - Vascular Sx consult called
Chronic LE venous stasis  - elevation LLE, c/w ASA, statin  - Vascular Sx consulted.
Chronic LE venous stasis  - elevation LLE, c/w ASA, statin  - Vascular Sx consulted.

## 2018-12-08 NOTE — PROGRESS NOTE ADULT - PROBLEM SELECTOR PLAN 1
-s/p I and D in the ED  -afebrile, WBC 6, he has been on outpatient doxycycline PO bid for months as he had cellulitis LLE a year ago.   - Vascular Sx consulted, Doppler LE negative DVT, will f/u as outpt  - ID follow up appreciated -s/p I and D in the ED; will c/w bactrim and ceftin x6 more days and then resume doxy therapy as he had been  -afebrile, WBC 6, he has been on outpatient doxycycline PO bid for months as he had cellulitis LLE a year ago.   - Vascular Sx consulted, Doppler LE negative DVT, will f/u as outpt; wound care per vascular

## 2018-12-09 VITALS
OXYGEN SATURATION: 97 % | RESPIRATION RATE: 18 BRPM | DIASTOLIC BLOOD PRESSURE: 80 MMHG | SYSTOLIC BLOOD PRESSURE: 123 MMHG | HEART RATE: 86 BPM | TEMPERATURE: 100 F

## 2018-12-09 LAB
CULTURE RESULTS: SIGNIFICANT CHANGE UP
ORGANISM # SPEC MICROSCOPIC CNT: SIGNIFICANT CHANGE UP
ORGANISM # SPEC MICROSCOPIC CNT: SIGNIFICANT CHANGE UP
SPECIMEN SOURCE: SIGNIFICANT CHANGE UP

## 2018-12-10 LAB
CULTURE RESULTS: SIGNIFICANT CHANGE UP
CULTURE RESULTS: SIGNIFICANT CHANGE UP
SPECIMEN SOURCE: SIGNIFICANT CHANGE UP
SPECIMEN SOURCE: SIGNIFICANT CHANGE UP

## 2018-12-11 ENCOUNTER — APPOINTMENT (OUTPATIENT)
Dept: VASCULAR SURGERY | Facility: CLINIC | Age: 73
End: 2018-12-11

## 2018-12-11 PROBLEM — I25.10 ATHEROSCLEROTIC HEART DISEASE OF NATIVE CORONARY ARTERY WITHOUT ANGINA PECTORIS: Chronic | Status: ACTIVE | Noted: 2018-12-04

## 2018-12-18 ENCOUNTER — APPOINTMENT (OUTPATIENT)
Dept: VASCULAR SURGERY | Facility: CLINIC | Age: 73
End: 2018-12-18
Payer: MEDICARE

## 2018-12-18 VITALS
BODY MASS INDEX: 31.39 KG/M2 | DIASTOLIC BLOOD PRESSURE: 60 MMHG | WEIGHT: 200 LBS | HEART RATE: 53 BPM | TEMPERATURE: 98 F | HEIGHT: 67 IN | SYSTOLIC BLOOD PRESSURE: 154 MMHG

## 2018-12-18 PROCEDURE — 99213 OFFICE O/P EST LOW 20 MIN: CPT

## 2019-01-09 ENCOUNTER — APPOINTMENT (OUTPATIENT)
Age: 74
End: 2019-01-09

## 2019-01-22 ENCOUNTER — APPOINTMENT (OUTPATIENT)
Dept: VASCULAR SURGERY | Facility: CLINIC | Age: 74
End: 2019-01-22
Payer: MEDICARE

## 2019-01-22 VITALS
DIASTOLIC BLOOD PRESSURE: 64 MMHG | HEIGHT: 67 IN | HEART RATE: 84 BPM | TEMPERATURE: 97.5 F | BODY MASS INDEX: 31.39 KG/M2 | SYSTOLIC BLOOD PRESSURE: 148 MMHG | WEIGHT: 200 LBS

## 2019-01-22 DIAGNOSIS — Z82.49 FAMILY HISTORY OF ISCHEMIC HEART DISEASE AND OTHER DISEASES OF THE CIRCULATORY SYSTEM: ICD-10-CM

## 2019-01-22 DIAGNOSIS — Z86.79 PERSONAL HISTORY OF OTHER DISEASES OF THE CIRCULATORY SYSTEM: ICD-10-CM

## 2019-01-22 DIAGNOSIS — Z86.39 PERSONAL HISTORY OF OTHER ENDOCRINE, NUTRITIONAL AND METABOLIC DISEASE: ICD-10-CM

## 2019-01-22 PROCEDURE — 99213 OFFICE O/P EST LOW 20 MIN: CPT

## 2019-01-22 NOTE — ASSESSMENT
[FreeTextEntry1] : Left knee ulcer healed   Left  ankle  area   small ulcer  open   He  has   compression  therapy at home   Plan  to continue  present  care  He  can see Dianne  in our  office  [Foot care/Footwear] : foot care/footwear [Ulcer Care] : ulcer care

## 2019-01-22 NOTE — HISTORY OF PRESENT ILLNESS
[FreeTextEntry1] : He lives  alone   Has  open  wound left  ankle  area  and  small  wound  knee area  No fever or  chills  or  any symptoms

## 2019-01-22 NOTE — REASON FOR VISIT
[Follow-Up: _____] : a [unfilled] follow-up visit [FreeTextEntry1] : left leg  swelling  and  recent  cellulitis

## 2019-01-22 NOTE — PHYSICAL EXAM
[2+] : left 2+ [FreeTextEntry1] : Ulcer left knee area  healed  Left  ankle   1 cm  ulcer   minimal  watery drainage  No cellulitis

## 2019-02-05 ENCOUNTER — APPOINTMENT (OUTPATIENT)
Dept: VASCULAR SURGERY | Facility: CLINIC | Age: 74
End: 2019-02-05
Payer: MEDICARE

## 2019-02-05 VITALS
SYSTOLIC BLOOD PRESSURE: 152 MMHG | HEIGHT: 67 IN | BODY MASS INDEX: 31.39 KG/M2 | HEART RATE: 83 BPM | TEMPERATURE: 98.8 F | DIASTOLIC BLOOD PRESSURE: 67 MMHG | WEIGHT: 200 LBS

## 2019-02-05 DIAGNOSIS — L97.829 VARICOSE VEINS OF LEFT LOWER EXTREMITY WITH ULCER OTHER PART OF LOWER LEG: ICD-10-CM

## 2019-02-05 DIAGNOSIS — I83.028 VARICOSE VEINS OF LEFT LOWER EXTREMITY WITH ULCER OTHER PART OF LOWER LEG: ICD-10-CM

## 2019-02-05 PROCEDURE — 29580 STRAPPING UNNA BOOT: CPT | Mod: LT

## 2019-02-05 NOTE — REVIEW OF SYSTEMS
[Joint Swelling] : joint swelling [Joint Stiffness] : joint stiffness [Limb Swelling] : limb swelling [As Noted in HPI] : as noted in HPI [Skin Wound] : skin wound [Itching] : itching [Dry Skin] : dry skin [Negative] : Endocrine

## 2019-02-05 NOTE — HISTORY OF PRESENT ILLNESS
[FreeTextEntry1] : 74 y/o m here for f/u has known deep insufficiency, vein stripping many years ago and left knee replacement w/ two subsequent left knee surgeries with complications and frequent episodes of cellulitis as per pt. Pt is tolerating left leg unna boot w/out complications and reports improvement in size to lower shin wound. However, 3 days ago on Saturday pt. noticed along left knee incision a large bulge developed w/ leaking fluid. Denies trauma or injury to affected area. Denies pain fever or chills.

## 2019-02-05 NOTE — ASSESSMENT
[Arterial/Venous Disease] : arterial/venous disease [Ulcer Care] : ulcer care [FreeTextEntry1] : 74 y/o m w/ chronic VI and left leg wounds. Distal shin wound is significantly smaller and healing. Responding well to unna boot. Indurated area on knee incision needs to be further evaluated. Suspicion of fluid accumulation and/or possible abscess. Concerned for risk of infection given hardware in left knee. Pt. states he has an appt w/ Dr. Michel tomorrow morning at [facility] for the knee.\par \par Medical Conservative Management leg elevation and compression therapy\par Continue unna boot for left distal shin wound\par RTO in 3 weeks or call with update after visit tomorrow. Pt agrees.

## 2019-02-05 NOTE — PROCEDURE
[FreeTextEntry1] : Left distal shin wound silver nitrate applied and unna boot from toes to below knee\par Anterior/lateral knee wound covered w/ dry dressing

## 2019-02-05 NOTE — PHYSICAL EXAM
[2+] : left 2+ [Ankle Swelling (On Exam)] : present [Ankle Swelling On The Left] : of the left ankle [Ankle Swelling On The Right] : mild [Alert] : alert [Oriented to Person] : oriented to person [Oriented to Place] : oriented to place [Oriented to Time] : oriented to time [Calm] : calm [de-identified] : in NAD [FreeTextEntry1] : Left leg moderate edema, no erythema. Left distal shin wound significantly smaller now 5mm w/ hypergranulation and mild serosanguinous drainage. Left anterior/lateral knee incision mod-severe indurated area w/ 2 superficial skin openings leaking serous drainage, warmth and firmness on palpation.  [de-identified] : WDL except limited range of motion and knee extension to left leg. Uses assistive device to ambulate.  [de-identified] : Cooperative

## 2019-02-05 NOTE — REASON FOR VISIT
[Follow-Up: _____] : a [unfilled] follow-up visit [Family Member] : family member [FreeTextEntry1] : left leg wounds

## 2019-02-26 ENCOUNTER — APPOINTMENT (OUTPATIENT)
Dept: VASCULAR SURGERY | Facility: CLINIC | Age: 74
End: 2019-02-26

## 2019-04-24 ENCOUNTER — APPOINTMENT (OUTPATIENT)
Dept: SURGERY | Facility: ASSISTED LIVING FACILITY | Age: 74
End: 2019-04-24
Payer: MEDICARE

## 2019-04-24 PROCEDURE — 99304 1ST NF CARE SF/LOW MDM 25: CPT

## 2019-09-11 NOTE — ED PROVIDER NOTE - NS ED MD EM SELECTION
From: Michael Arriola  To: New Chatman MD  Sent: 9/9/2019 5:03 PM CDT  Subject: Referral Request    Can you tell me which gastroenterologist did my colonoscopy?     Do I need a referral to follow up on my ER visit?    Thank you for your help!    [id number] [facility]

## 2019-10-11 NOTE — PATIENT PROFILE ADULT - ARE SIGNIFICANT INDICATORS COMPLETE.
Called to discuss with patient. She has been wearing a compression sock and notices a difference with swelling today. She has not been checking her weight but she will. She had no further questions.    Yes

## 2019-11-05 ENCOUNTER — APPOINTMENT (OUTPATIENT)
Dept: OPHTHALMOLOGY | Facility: CLINIC | Age: 74
End: 2019-11-05
Payer: MEDICARE

## 2019-11-05 ENCOUNTER — NON-APPOINTMENT (OUTPATIENT)
Age: 74
End: 2019-11-05

## 2019-11-05 PROCEDURE — 92014 COMPRE OPH EXAM EST PT 1/>: CPT

## 2019-11-05 PROCEDURE — 92225: CPT | Mod: LT

## 2019-12-10 ENCOUNTER — APPOINTMENT (OUTPATIENT)
Dept: VASCULAR SURGERY | Facility: CLINIC | Age: 74
End: 2019-12-10
Payer: MEDICARE

## 2019-12-10 VITALS
BODY MASS INDEX: 34.53 KG/M2 | WEIGHT: 220 LBS | HEIGHT: 67 IN | SYSTOLIC BLOOD PRESSURE: 145 MMHG | TEMPERATURE: 98.1 F | HEART RATE: 80 BPM | DIASTOLIC BLOOD PRESSURE: 77 MMHG

## 2019-12-10 PROCEDURE — 29580 STRAPPING UNNA BOOT: CPT | Mod: RT

## 2019-12-10 PROCEDURE — 99213 OFFICE O/P EST LOW 20 MIN: CPT | Mod: 25

## 2019-12-10 NOTE — ASSESSMENT
[FreeTextEntry1] : He has   anonhealing  ulcer  right  leg secondary  to venous  HTN  Plan  Unna boot today and  return in  2 weeks  for  followup  after  Home  Care  change of the  unna boot  every  5 days   He is  going  to need to  wear s tocking  for  the  right  leg  once the  ulcer heals  there is  no  cellulitis  currently [Arterial/Venous Disease] : arterial/venous disease [Ulcer Care] : ulcer care

## 2019-12-10 NOTE — HISTORY OF PRESENT ILLNESS
[FreeTextEntry1] : He had  a biopsy of the  right  leg  Now  has  ulcer  and  drainage   In  the past  he had  US  showing  deep s ystem and    incompetence  he  had  left leg ulcers which  healed   He  wears  compression  to the  left leg  Does not  wear  any compression  right  leg  he  was treated  with  topical  antibiotics  and  steroid  creams

## 2019-12-31 ENCOUNTER — APPOINTMENT (OUTPATIENT)
Dept: VASCULAR SURGERY | Facility: CLINIC | Age: 74
End: 2019-12-31
Payer: MEDICARE

## 2019-12-31 VITALS
BODY MASS INDEX: 36.1 KG/M2 | HEART RATE: 65 BPM | SYSTOLIC BLOOD PRESSURE: 149 MMHG | TEMPERATURE: 97.5 F | HEIGHT: 67 IN | DIASTOLIC BLOOD PRESSURE: 66 MMHG | WEIGHT: 230 LBS

## 2019-12-31 DIAGNOSIS — I83.891 VARICOSE VEINS OF RIGHT LOWER EXTREMITY WITH OTHER COMPLICATIONS: ICD-10-CM

## 2019-12-31 PROCEDURE — 99213 OFFICE O/P EST LOW 20 MIN: CPT | Mod: 25

## 2019-12-31 PROCEDURE — 29580 STRAPPING UNNA BOOT: CPT | Mod: RT

## 2019-12-31 NOTE — ASSESSMENT
[Arterial/Venous Disease] : arterial/venous disease [Ulcer Care] : ulcer care [FreeTextEntry1] : 73 y/o m w/ chronic VI and right leg ulcer\par \par Medical Conservative Management leg elevation and compression therapy\par Continue right leg unna boot q 5 days w/ home care until completely healed\par RTO in 3-4 weeks for f/u and re eval need for custom compression wraps on right leg

## 2019-12-31 NOTE — PHYSICAL EXAM
[2+] : left 2+ [Ankle Swelling (On Exam)] : present [Oriented to Place] : oriented to place [Alert] : alert [Oriented to Person] : oriented to person [Oriented to Time] : oriented to time [Calm] : calm [Ankle Swelling Bilaterally] : bilaterally  [de-identified] : in NAD [de-identified] : WDL except limited range of motion and knee extension to left leg. Uses assistive device to ambulate.  [FreeTextEntry1] : Left leg moderate edema, skin intact. Right lateral calf small superficial wound w/ mild serous drainage and granulation tissue 0.5cm x 1cm. No s/s of infection [de-identified] : Cooperative

## 2019-12-31 NOTE — HISTORY OF PRESENT ILLNESS
[FreeTextEntry1] : 73 y/o m here for f/u has known deep insufficiency, vein stripping many years ago and left knee replacement w/ 3 subsequent left knee surgeries with complications and frequent episodes of cellulitis as per pt. He is here for f/u on right leg ulcer, tolerating unna boot w/ home care services. Denies any new problems or complaints since previous visit. Denies pain fever or chills. Compliant w/ compression wraps on left leg.

## 2019-12-31 NOTE — REVIEW OF SYSTEMS
[Joint Swelling] : joint swelling [Limb Swelling] : limb swelling [Joint Stiffness] : joint stiffness [As Noted in HPI] : as noted in HPI [Skin Wound] : skin wound [Negative] : Endocrine

## 2020-05-07 ENCOUNTER — APPOINTMENT (OUTPATIENT)
Dept: WOUND CARE | Facility: CLINIC | Age: 75
End: 2020-05-07
Payer: MEDICARE

## 2020-05-07 VITALS — TEMPERATURE: 97.4 F | BODY MASS INDEX: 36.1 KG/M2 | WEIGHT: 230 LBS | HEIGHT: 67 IN

## 2020-05-07 DIAGNOSIS — L03.119 CELLULITIS OF UNSPECIFIED PART OF LIMB: ICD-10-CM

## 2020-05-07 PROCEDURE — 29580 STRAPPING UNNA BOOT: CPT

## 2020-05-07 PROCEDURE — 99203 OFFICE O/P NEW LOW 30 MIN: CPT

## 2020-05-07 NOTE — PLAN
[FreeTextEntry1] : 74M w/ right ankle venous stasis ulceration w/ swelling and weeping, venous stasis dermatitis and interdigital maceration in all interspaces of right foot, possible erythrasma \par -Pt seen and evaluated \par -Right lateral ankle superficial venous stasis ulceration measuring 3x0.2 cm, mild weeping, peeling and mild erythema, venous stasis dermatitis, right leg swollen w/ +3 pitting edema, maceration and weeping between the toes in all interspaces w/ no open wounds. \par -Took a wound culture of the toe interspaces 2-5\par -Rx PO Levaquin\par -Triamcinolone applied to venous stasis wound and right leg\par -Gentian violet applied to all toe interspaces \par -Unna boot applied to right leg \par -Nursing can continue to come to apply alginate between the toe interspaces but no gentamycin and leave unna boot intact \par -Will be measured for a compression stocking for the right leg next week\par -RTC in 1 week

## 2020-05-07 NOTE — HISTORY OF PRESENT ILLNESS
[FreeTextEntry1] : 74M presents to clinic complaining of right ankle wound. Pt says the wound started in October 2019 after he was wearing shower clogs and the strap caused the wound on the ankle. He had a biopsy of the wound on 10/31/19 which he said was negative. He was going to [place] [facility], which closed because of COVID. Pt is now complaining of ankle "peeling and bleeding" with "water draining from the toes" and also related itchiness in the leg and ankle. Visiting nursing has been doing dressing changes w/ alginate and gentamycin in the toe interspaces. He wears compression stockings on the left leg, not the right bc the foot and leg were weeping. Pt says the weeping has mostly subsided.\par \par Pt sees vascular Dr. Machado for venous stasis/varicose veins who did wound care w/ unna boots in the past. Pt was taking PO Levaquin in February, was seeing pod Dr. Karen Schumer. Pt has a hx of left knee fusion in 2019 w/ ex fix, started after he had a venous stripping procedure in 1993 that turned in to multiple surgeries w/ subsequent infections. Denies any N/V/F/C/SOB.\par

## 2020-05-07 NOTE — PHYSICAL EXAM
[2+] : left 2+ [Ankle Swelling (On Exam)] : present [Ankle Swelling On The Right] : of the right ankle [Varicose Veins Of Lower Extremities] : present [Varicose Veins Of The Right Leg] : of the right leg [] : of the right leg [Ankle Swelling On The Left] : moderate [Skin Ulcer] : ulcer [de-identified] : venous stasis dermatitis right ankle w/ superficial venous ulceration [de-identified] : Right lateral ankle superficial venous ulceration measuring 3x0.2 cm, mild weeping, peeling and mild erythema surrounding excoriations, right leg swollen w/ +3 pitting edema, maceration between the toes w/ no open wounds. [FreeTextEntry1] : ankle [FreeTextEntry2] : 3 cm [de-identified] : dry, peeling [FreeTextEntry4] : 0.0 cm [FreeTextEntry3] : 0.2 cm [de-identified] : Queta CHRISTENSEN [TWNoteComboBox1] : Right [TWNoteComboBox3] : PT [TWNoteComboBox2] : 1 [TWNoteComboBox4] : Moderate [TWNoteComboBox5] : No [de-identified] : No [TWNoteComboBox6] : Venous [de-identified] : other [de-identified] : None [de-identified] : 100% [de-identified] : None [de-identified] : No [de-identified] : Unna Boot

## 2020-05-12 LAB — BACTERIA SPEC CULT: NORMAL

## 2020-05-14 ENCOUNTER — APPOINTMENT (OUTPATIENT)
Dept: WOUND CARE | Facility: CLINIC | Age: 75
End: 2020-05-14
Payer: MEDICARE

## 2020-05-14 VITALS — BODY MASS INDEX: 36.1 KG/M2 | HEIGHT: 67 IN | TEMPERATURE: 97.7 F | WEIGHT: 230 LBS

## 2020-05-14 DIAGNOSIS — L97.919 VENOUS INSUFFICIENCY (CHRONIC) (PERIPHERAL): ICD-10-CM

## 2020-05-14 DIAGNOSIS — I87.2 VENOUS INSUFFICIENCY (CHRONIC) (PERIPHERAL): ICD-10-CM

## 2020-05-14 PROCEDURE — 99213 OFFICE O/P EST LOW 20 MIN: CPT

## 2020-05-14 NOTE — HISTORY OF PRESENT ILLNESS
[FreeTextEntry1] : 74M presents to clinic follow up RLE venous stasis wounds/ dermatitis. Pt had unna boot for past week. Was prescribed Levaquin and developed hives after 3 days and discounted. VNS comes to house and puts aliginate between toes. Denies f/n/v/chills. \par

## 2020-05-14 NOTE — PHYSICAL EXAM
[2+] : right 2+ [Ankle Swelling On The Right] : of the right ankle [Ankle Swelling (On Exam)] : present [Varicose Veins Of Lower Extremities] : present [Varicose Veins Of The Right Leg] : of the right leg [] : of the right leg [Ankle Swelling On The Left] : moderate [Skin Ulcer] : ulcer [de-identified] : venous stasis dermatitis right ankle w/ superficial venous ulceration [de-identified] : R edema sig improved, wounds epithelialized, no clinical signs of infection, interdigital maceration all R interspaces. [FreeTextEntry1] : ankle- healed [de-identified] : dry, peeling [TWNoteComboBox1] : Right [TWNoteComboBox2] : False [TWNoteComboBox3] : False [TWNoteComboBox4] : False [TWNoteComboBox5] : False [TWNoteComboBox6] : Venous [de-identified] : No [de-identified] : other [de-identified] : None [de-identified] : None [de-identified] : 100% [de-identified] : No [de-identified] : Unna Boot

## 2020-05-14 NOTE — PLAN
[FreeTextEntry1] : 74M w/ right ankle venous  dermatitis and interdigital maceration in all interspaces of right foot, possible erythrasma \par - Pt seen and evaluated \par - Wcx growing normal steffany\par - Significant improvement of RLE edema + dermatitis. Weeping wounds resolved. \par -Pt had hives reaction w/ Levaquin discontinued after 3 days. No current signs of infection. \par -Triamcinolone acetondie 0.1%  + Terbinafine applied to bilateral legs \par -Gentian violet applied to all toe interspaces  \par - Pt to be measured for Circaid compressions today \par - Rx Atarax 25 mg TID needed \par - Rx: Ciclopriox 0.77% ointment and Triamcinolone 0.1% to apply to legs 2X day\par - RTC in 3  weeks

## 2020-06-04 ENCOUNTER — APPOINTMENT (OUTPATIENT)
Dept: WOUND CARE | Facility: CLINIC | Age: 75
End: 2020-06-04
Payer: MEDICARE

## 2020-06-04 VITALS — BODY MASS INDEX: 36.1 KG/M2 | TEMPERATURE: 98.4 F | WEIGHT: 230 LBS | HEIGHT: 67 IN

## 2020-06-04 DIAGNOSIS — L97.519 NON-PRESSURE CHRONIC ULCER OF OTHER PART OF RIGHT FOOT WITH UNSPECIFIED SEVERITY: ICD-10-CM

## 2020-06-04 DIAGNOSIS — I87.2 VENOUS INSUFFICIENCY (CHRONIC) (PERIPHERAL): ICD-10-CM

## 2020-06-04 PROCEDURE — 99213 OFFICE O/P EST LOW 20 MIN: CPT

## 2020-06-04 NOTE — HISTORY OF PRESENT ILLNESS
[FreeTextEntry1] : 74M presents to clinic follow up RLE venous stasis wounds/ dermatitis. Pt had unna boot for past week. Was prescribed Levaquin and developed hives after 3 days and discounted. VNS comes to house and puts aliginate between toes. Denies f/n/v/chills. \par Pt relates in last week toe wounds had increase in drainage redness and pain. Pt was on recent dose of Augmentin for mouth infection. denies f/n/v/chills. Ambulates in custom shoes. Has been applying steroid and antifungal to legs. waiting on circaid to be delivered.

## 2020-06-04 NOTE — PLAN
[FreeTextEntry1] : 74M w/ right ankle venous  dermatitis and interdigital maceration in all interspaces of right foot, possible erythrasma \par - Pt seen and evaluated \par - Previous  Wcx growing normal steffany\par -- New RF wound culture taken \par - Significant improvement of RLE edema + dermatitis. \par - increase weeping interdigital maceration w/ erythema. \par - Continue Triamcinolone acetondie 0.1%  + Terbinafine applied to bilateral legs \par - Gentian violet applied to all toe interspaces\par - Rec dilute white vinegar and water foot soaks.   \par - C/w gauze between toes with Nystatin  foot powder. \par - Awaiting Circaid to de delivered \par - Rx: Diflucan 100 mg X 7 days w/ 1 refill\par - RTC  1 weeks

## 2020-06-04 NOTE — PHYSICAL EXAM
[2+] : left 2+ [Ankle Swelling (On Exam)] : present [Ankle Swelling On The Right] : of the right ankle [Varicose Veins Of Lower Extremities] : present [Varicose Veins Of The Right Leg] : of the right leg [] : of the right leg [Ankle Swelling On The Left] : moderate [Skin Ulcer] : ulcer [de-identified] : venous stasis dermatitis right ankle w/ superficial venous ulceration [de-identified] : R edema sig improved, wounds epithelialized, no clinical signs of infection, interdigital maceration with increase serous drainage and erythema.  [FreeTextEntry1] : ankle- healed [de-identified] : dry, peeling [TWNoteComboBox1] : Right [TWNoteComboBox6] : Venous [de-identified] : No [de-identified] : other [de-identified] : None [de-identified] : None [de-identified] : 100% [de-identified] : No [de-identified] : Unna Boot

## 2020-06-09 ENCOUNTER — APPOINTMENT (OUTPATIENT)
Dept: VASCULAR SURGERY | Facility: CLINIC | Age: 75
End: 2020-06-09

## 2020-06-10 ENCOUNTER — EMERGENCY (EMERGENCY)
Facility: HOSPITAL | Age: 75
LOS: 1 days | Discharge: HOME | End: 2020-06-10
Attending: EMERGENCY MEDICINE
Payer: MEDICARE

## 2020-06-10 VITALS
DIASTOLIC BLOOD PRESSURE: 73 MMHG | RESPIRATION RATE: 16 BRPM | HEART RATE: 89 BPM | OXYGEN SATURATION: 98 % | SYSTOLIC BLOOD PRESSURE: 131 MMHG | TEMPERATURE: 98 F

## 2020-06-10 VITALS
SYSTOLIC BLOOD PRESSURE: 118 MMHG | HEART RATE: 68 BPM | RESPIRATION RATE: 16 BRPM | OXYGEN SATURATION: 97 % | TEMPERATURE: 98 F | DIASTOLIC BLOOD PRESSURE: 99 MMHG

## 2020-06-10 DIAGNOSIS — Z96.652 PRESENCE OF LEFT ARTIFICIAL KNEE JOINT: Chronic | ICD-10-CM

## 2020-06-10 LAB
ALBUMIN SERPL ELPH-MCNC: 4.1 G/DL — SIGNIFICANT CHANGE UP (ref 3.3–5)
ALP SERPL-CCNC: 128 U/L — HIGH (ref 40–120)
ALT FLD-CCNC: 18 U/L — SIGNIFICANT CHANGE UP (ref 10–45)
ANION GAP SERPL CALC-SCNC: 13 MMOL/L — SIGNIFICANT CHANGE UP (ref 5–17)
APTT BLD: 31.6 SEC — SIGNIFICANT CHANGE UP (ref 27.5–36.3)
AST SERPL-CCNC: 26 U/L — SIGNIFICANT CHANGE UP (ref 10–40)
BASOPHILS # BLD AUTO: 0.06 K/UL — SIGNIFICANT CHANGE UP (ref 0–0.2)
BASOPHILS NFR BLD AUTO: 0.9 % — SIGNIFICANT CHANGE UP (ref 0–2)
BILIRUB SERPL-MCNC: 0.5 MG/DL — SIGNIFICANT CHANGE UP (ref 0.2–1.2)
BUN SERPL-MCNC: 20 MG/DL — SIGNIFICANT CHANGE UP (ref 7–23)
CALCIUM SERPL-MCNC: 9.3 MG/DL — SIGNIFICANT CHANGE UP (ref 8.4–10.5)
CHLORIDE SERPL-SCNC: 104 MMOL/L — SIGNIFICANT CHANGE UP (ref 96–108)
CO2 SERPL-SCNC: 23 MMOL/L — SIGNIFICANT CHANGE UP (ref 22–31)
CREAT SERPL-MCNC: 0.91 MG/DL — SIGNIFICANT CHANGE UP (ref 0.5–1.3)
CRP SERPL-MCNC: 0.38 MG/DL — SIGNIFICANT CHANGE UP (ref 0–0.4)
EOSINOPHIL # BLD AUTO: 0.29 K/UL — SIGNIFICANT CHANGE UP (ref 0–0.5)
EOSINOPHIL NFR BLD AUTO: 4.5 % — SIGNIFICANT CHANGE UP (ref 0–6)
ERYTHROCYTE [SEDIMENTATION RATE] IN BLOOD: 54 MM/HR — HIGH (ref 0–20)
GLUCOSE SERPL-MCNC: 95 MG/DL — SIGNIFICANT CHANGE UP (ref 70–99)
HCT VFR BLD CALC: 35.4 % — LOW (ref 39–50)
HGB BLD-MCNC: 11.6 G/DL — LOW (ref 13–17)
IMM GRANULOCYTES NFR BLD AUTO: 0.3 % — SIGNIFICANT CHANGE UP (ref 0–1.5)
INR BLD: 1.02 RATIO — SIGNIFICANT CHANGE UP (ref 0.88–1.16)
LYMPHOCYTES # BLD AUTO: 1.51 K/UL — SIGNIFICANT CHANGE UP (ref 1–3.3)
LYMPHOCYTES # BLD AUTO: 23.3 % — SIGNIFICANT CHANGE UP (ref 13–44)
MCHC RBC-ENTMCNC: 29.9 PG — SIGNIFICANT CHANGE UP (ref 27–34)
MCHC RBC-ENTMCNC: 32.8 GM/DL — SIGNIFICANT CHANGE UP (ref 32–36)
MCV RBC AUTO: 91.2 FL — SIGNIFICANT CHANGE UP (ref 80–100)
MONOCYTES # BLD AUTO: 0.66 K/UL — SIGNIFICANT CHANGE UP (ref 0–0.9)
MONOCYTES NFR BLD AUTO: 10.2 % — SIGNIFICANT CHANGE UP (ref 2–14)
NEUTROPHILS # BLD AUTO: 3.93 K/UL — SIGNIFICANT CHANGE UP (ref 1.8–7.4)
NEUTROPHILS NFR BLD AUTO: 60.8 % — SIGNIFICANT CHANGE UP (ref 43–77)
NRBC # BLD: 0 /100 WBCS — SIGNIFICANT CHANGE UP (ref 0–0)
PLATELET # BLD AUTO: 192 K/UL — SIGNIFICANT CHANGE UP (ref 150–400)
POTASSIUM SERPL-MCNC: 4.2 MMOL/L — SIGNIFICANT CHANGE UP (ref 3.5–5.3)
POTASSIUM SERPL-SCNC: 4.2 MMOL/L — SIGNIFICANT CHANGE UP (ref 3.5–5.3)
PROT SERPL-MCNC: 7.4 G/DL — SIGNIFICANT CHANGE UP (ref 6–8.3)
PROTHROM AB SERPL-ACNC: 11.7 SEC — SIGNIFICANT CHANGE UP (ref 10–12.9)
RBC # BLD: 3.88 M/UL — LOW (ref 4.2–5.8)
RBC # FLD: 14.5 % — SIGNIFICANT CHANGE UP (ref 10.3–14.5)
SODIUM SERPL-SCNC: 140 MMOL/L — SIGNIFICANT CHANGE UP (ref 135–145)
WBC # BLD: 6.47 K/UL — SIGNIFICANT CHANGE UP (ref 3.8–10.5)
WBC # FLD AUTO: 6.47 K/UL — SIGNIFICANT CHANGE UP (ref 3.8–10.5)

## 2020-06-10 PROCEDURE — 80053 COMPREHEN METABOLIC PANEL: CPT

## 2020-06-10 PROCEDURE — 99284 EMERGENCY DEPT VISIT MOD MDM: CPT

## 2020-06-10 PROCEDURE — 99283 EMERGENCY DEPT VISIT LOW MDM: CPT

## 2020-06-10 PROCEDURE — 85027 COMPLETE CBC AUTOMATED: CPT

## 2020-06-10 PROCEDURE — 85652 RBC SED RATE AUTOMATED: CPT

## 2020-06-10 PROCEDURE — 85610 PROTHROMBIN TIME: CPT

## 2020-06-10 PROCEDURE — 99284 EMERGENCY DEPT VISIT MOD MDM: CPT | Mod: 25

## 2020-06-10 PROCEDURE — 86140 C-REACTIVE PROTEIN: CPT

## 2020-06-10 PROCEDURE — 85730 THROMBOPLASTIN TIME PARTIAL: CPT

## 2020-06-10 PROCEDURE — 93971 EXTREMITY STUDY: CPT | Mod: 26,RT

## 2020-06-10 PROCEDURE — 73630 X-RAY EXAM OF FOOT: CPT | Mod: 26,RT

## 2020-06-10 PROCEDURE — 96374 THER/PROPH/DIAG INJ IV PUSH: CPT

## 2020-06-10 PROCEDURE — 93971 EXTREMITY STUDY: CPT

## 2020-06-10 PROCEDURE — 87040 BLOOD CULTURE FOR BACTERIA: CPT

## 2020-06-10 PROCEDURE — 73630 X-RAY EXAM OF FOOT: CPT

## 2020-06-10 RX ADMIN — Medication 1 TABLET(S): at 20:14

## 2020-06-10 RX ADMIN — Medication 110 MILLIGRAM(S): at 16:50

## 2020-06-10 NOTE — ED ADULT NURSE NOTE - NSIMPLEMENTINTERV_GEN_ALL_ED
Implemented All Fall with Harm Risk Interventions:  [place] to call system. Call bell, personal items and telephone within reach. Instruct patient to call for assistance. Room bathroom lighting operational. Non-slip footwear when patient is off stretcher. Physically safe environment: no spills, clutter or unnecessary equipment. Stretcher in lowest position, wheels locked, appropriate side rails in place. Provide visual cue, wrist band, yellow gown, etc. Monitor gait and stability. Monitor for mental status changes and reorient to person, place, and time. Review medications for side effects contributing to fall risk. Reinforce activity limits and safety measures with patient and family. Provide visual clues: red socks.

## 2020-06-10 NOTE — ED ADULT NURSE NOTE - OBJECTIVE STATEMENT
75 year old male A&OX4 with a past medical hx of CAD, BPH, A-fib, presents with right lower extremity redness and swelling x two months that has gotten progressively worse. Patient is currently seeing a wound care specialist and was given an antifungal orally and topically over the last 5 days with no relief. Right lower extremity is red, warm to touch, and with discharge. Difficulty with ambulation endorsed. Denies fevers, chills, nausea, vomiting, dizziness, weakness, chest pain, shortness of breath.

## 2020-06-10 NOTE — CONSULT NOTE ADULT - ASSESSMENT
76 yo M w/ RF wheeping interdigital maceration 74 yo M w/ RF wheeping interdigital maceration   - pt seen and evaulated in ED  - Pt known to Podiatry Service. RF interdigital maceration/fungal infection s/p 5 day course of Diflucan  - In office wound culture 6/4 growing PsA, MSSA, E Faecalis   - afebrile, no leukocytosis  - XR pending   - Mild erythema surrounding interspaced unchanged from last week.   - Continue with dilute white vinegar soaks   - Nystatin powder w/ gauze in-between toes  - Rec 10 days Augmenti 875 mg BID  - [facility]18 Call [phone] to make an appointment.   - D/w Dr Fonseca

## 2020-06-10 NOTE — ED PROVIDER NOTE - CHIEF COMPLAINT
The patient is a 75y Male complaining of swelling of lower extremities. [No Acute Distress] : no acute distress [Well Nourished] : well nourished [Well Developed] : well developed [Well-Appearing] : well-appearing [EOMI] : extraocular movements intact [Normal Outer Ear/Nose] : the outer ears and nose were normal in appearance [No JVD] : no jugular venous distention [Supple] : supple [No Lymphadenopathy] : no lymphadenopathy [No Respiratory Distress] : no respiratory distress  [Clear to Auscultation] : lungs were clear to auscultation bilaterally [Regular Rhythm] : with a regular rhythm [Normal Rate] : normal rate  [No Accessory Muscle Use] : no accessory muscle use [Non-distended] : non-distended [No Edema] : there was no peripheral edema [Normal S1, S2] : normal S1 and S2 [No CVA Tenderness] : no CVA  tenderness [Grossly Normal Strength/Tone] : grossly normal strength/tone [No Rash] : no rash [Coordination Grossly Intact] : coordination grossly intact [Normal Gait] : normal gait [No Focal Deficits] : no focal deficits [Normal Affect] : the affect was normal [Normal Mood] : the mood was normal [Normal Insight/Judgement] : insight and judgment were intact

## 2020-06-10 NOTE — CONSULT NOTE ADULT - SUBJECTIVE AND OBJECTIVE BOX
Podiatry pager #: [place] Pager:  [phone] [facility] Pager: [phone]    Patient is a 75y old  Male who presents with a chief complaint of RF wounds    HPI: Pt presentes w/ worsening mino of R interdigital maceration. Has been [facility] who recently prescribed Diflucan and nystatin powder for fungal infection in between toes.       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  CAD (coronary artery disease)  Afib  History of total left knee replacement (TKR)      MEDICATIONS  (STANDING):    MEDICATIONS  (PRN):      Allergies    No Known Allergies    Intolerances        VITALS:    Vital Signs Last 24 Hrs  T(C): 36.8 (10 Dilip 2020 15:14), Max: 36.8 (10 Dilip 2020 15:14)  T(F): 98.3 (10 Dilip 2020 15:14), Max: 98.3 (10 Dilip 2020 15:14)  HR: 89 (10 Dilip 2020 15:14) (89 - 89)  BP: 131/73 (10 Dilip 2020 15:14) (131/73 - 131/73)  BP(mean): 93 (10 Dilip 2020 15:14) (93 - 93)  RR: 16 (10 Dilip 2020 15:14) (16 - 16)  SpO2: 98% (10 Dilip 2020 15:14) (98% - 98%)    LABS:                          11.6   6.47  )-----------( 192      ( 10 Dilip 2020 16:02 )             35.4               CAPILLARY BLOOD GLUCOSE              LOWER EXTREMITY PHYSICAL EXAM:    Vascular: DP/PT 2/4, B/L, CFT <3 seconds B/L, Temperature gradient warm to warmB/L, + 2 pitting edema b/l LE  Neuro: Epicritic sensation intact to the level of digits B/L.  Musculoskeletal/Ortho: no gross deformities   Skin: venous stasis dermatitis b/l LE, Right foot w/ interdigital maceration and erythema to midfoot.       RADIOLOGY & ADDITIONAL STUDIES:

## 2020-06-10 NOTE — ED ADULT NURSE REASSESSMENT NOTE - NS ED NURSE REASSESS COMMENT FT1
Pt resting comfortably, awaiting doppler results. Urinal provided, warm blanket provided, lights turned off. VSS.

## 2020-06-10 NOTE — ED PROVIDER NOTE - CLINICAL SUMMARY MEDICAL DECISION MAKING FREE TEXT BOX
ATTG: : foot pain and swelling concern for infection check labs, check xray check US lower ext, pain medication and re salome for dispo

## 2020-06-10 NOTE — ED PROVIDER NOTE - PHYSICAL EXAMINATION
Nydia Galindo (PA):  Gen: WNWD NAD  HEENT: NCAT PERRL EOMI normal pharynx  Neck: supple  CV: RRR, no murmur  Lung: CTA BL  Abd: +BS soft NTND  Ext: Healing abrasions to dorsum of right foot at most proximal portion of foot, observed in all 5 toes, but most significantly in great toe and 2nd and 3rd toes with significant erythema and drainage between toes with fungal appearing discharge and rash between toes. Wound between great toe and 2nd toe with serous sanguinous drainage. Warmth to foot and lower leg.  Neuro: CN grossly intact, sensation intact, motor 5/5 throughout

## 2020-06-10 NOTE — ED PROVIDER NOTE - PATIENT PORTAL LINK FT
You can access the FollowMyHealth Patient Portal offered by [facility] by registering at the following website: [email or web address]. By joining "[employer]"’s FollowMyHealth portal, you will also be able to view your health information using other applications (apps) compatible with our system.

## 2020-06-10 NOTE — ED PROVIDER NOTE - PROGRESS NOTE DETAILS
podiatry resident saw and evaluated patient and discussed case with Dr. Fonseca. Resident states Dr. Fonseca is OK with patient being discharged on 10 days of augmentin and follow-up with him in his office next week. Patient to receive RLE duplex and if within normal limits can be discharged for follow-up with his podiatrist and strict return precautions. -Nydia Galindo PA-C

## 2020-06-10 NOTE — ED PROVIDER NOTE - NSFOLLOWUPINSTRUCTIONS_ED_ALL_ED_FT
1. Continue to elevate your leg to prevent swelling   2. Take Augmentin 1 tablet twice daily for 10 days   3. Follow-up with Dr. Fonseca next week for reevaluation and continued treatment   4. Return to the ER for any new or worsening symptoms

## 2020-06-10 NOTE — ED PROVIDER NOTE - OBJECTIVE STATEMENT
75y M PMHx PM, CAD (heart stent), A-fib and PSHx total left knee replacement presents to ED c/o right foot swelling x 2 mo which has been worsening with pain in the past week. Pt reports it is caused by shower strap. 2 weeks ago had uniboot on right foot. Seeing wound care doctor for past few weeks. Podiatrist Dr. Fonseca [phone]. A week ago started fluconozole and miconazole, but not improvement.  Currently taking Synthroid, Vitamin D, Vitamin C, Vitorin. Allergic to Tamsulosin, Camsolosin, Myrbetrik.  Denies F/C. Denies h/o of HTN or taking blood pressure medication. Endorses difficulty ambulating 2/2 foot pain. Normally walks with a cane.

## 2020-06-10 NOTE — ED PROVIDER NOTE - NS_ ATTENDINGSCRIBEDETAILS _ED_A_ED_FT
74 y/o m with pmhx presents with right foot swelling and pain. Pt states it all began in Oct 2019 but more recently has noted the redness travelling further up his leg. denies any recent trauma. no fever or chills. pt having new drainage between first and second toes.   PMD Dr. Roni Roberts  Gen. no acute distress  HEENT:  perrl eomi  Lungs:  b/l bs  CVS: S1S2   Abd;  soft non tender no distention  Ext: right lower ext edema extends from foot to ant shin. + pitting edema  Neuro: aaox3 no focal deficits  MSK: strength - 5/5 b/l upper and lower, limited flex of left lower ext from prior surgery with hardware.

## 2020-06-11 ENCOUNTER — APPOINTMENT (OUTPATIENT)
Dept: WOUND CARE | Facility: CLINIC | Age: 75
End: 2020-06-11

## 2020-06-11 LAB — BACTERIA SPEC CULT: ABNORMAL

## 2020-06-18 ENCOUNTER — NON-APPOINTMENT (OUTPATIENT)
Age: 75
End: 2020-06-18

## 2020-06-18 ENCOUNTER — APPOINTMENT (OUTPATIENT)
Dept: WOUND CARE | Facility: CLINIC | Age: 75
End: 2020-06-18
Payer: MEDICARE

## 2020-06-18 VITALS — HEIGHT: 67 IN | WEIGHT: 230 LBS | TEMPERATURE: 98.1 F | BODY MASS INDEX: 36.1 KG/M2

## 2020-06-18 DIAGNOSIS — L30.4 ERYTHEMA INTERTRIGO: ICD-10-CM

## 2020-06-18 PROCEDURE — 99213 OFFICE O/P EST LOW 20 MIN: CPT

## 2020-06-18 NOTE — HISTORY OF PRESENT ILLNESS
[FreeTextEntry1] : 75M presents to clinic follow up RLE venous stasis wounds/ dermatitis. Pt had unna boot for past week. Was prescribed Levaquin and developed hives after 3 days and discounted. VNS comes to house and puts aliginate between toes. Denies f/n/v/chills. \par Pt relates in last week toe wounds had increase in drainage redness and pain. Pt was on recent dose of Augmentin for mouth infection. denies f/n/v/chills. Ambulates in custom shoes. Has been applying steroid and antifungal to legs. waiting on circaid to be delivered.

## 2020-06-18 NOTE — PHYSICAL EXAM
[] : bilaterally [Ankle Swelling Bilaterally] : severe [Please See PDF for [employer]] : Please See PDF for [employer].

## 2020-06-18 NOTE — PLAN
[FreeTextEntry1] : 75M w/ right ankle venous  dermatitis and interdigital maceration in all interspaces of right foot, possible erythrasma \par - Pt seen and evaluated \par - Previous  Wcx growing normal steffany\par - Significant improvement of RLE edema + dermatitis. \par - Stable right foot interdigital maceration but erythema localized to interdigital and dorsal forefoot\par - Continue lymphedema control therapy with therapist/doctor John\par - Nursing orders for Silver alginate roping interdigitally with vinegar soaks\par - Dr. Fonseca contacted lymphedema therapist and nurse for update on patient status and instructions\par - RTC in 2 weeks

## 2020-06-29 ENCOUNTER — INPATIENT (INPATIENT)
Facility: HOSPITAL | Age: 75
LOS: 8 days | Discharge: HOME | DRG: 300 | End: 2020-07-08
Attending: INTERNAL MEDICINE | Admitting: INTERNAL MEDICINE
Payer: MEDICARE

## 2020-06-29 ENCOUNTER — TRANSCRIPTION ENCOUNTER (OUTPATIENT)
Age: 75
End: 2020-06-29

## 2020-06-29 VITALS
HEART RATE: 107 BPM | SYSTOLIC BLOOD PRESSURE: 165 MMHG | HEIGHT: 67 IN | RESPIRATION RATE: 20 BRPM | TEMPERATURE: 98 F | WEIGHT: 229.94 LBS | OXYGEN SATURATION: 99 % | DIASTOLIC BLOOD PRESSURE: 79 MMHG

## 2020-06-29 DIAGNOSIS — Z96.652 PRESENCE OF LEFT ARTIFICIAL KNEE JOINT: Chronic | ICD-10-CM

## 2020-06-29 DIAGNOSIS — B35.3 TINEA PEDIS: ICD-10-CM

## 2020-06-29 LAB
ALBUMIN SERPL ELPH-MCNC: 3.8 G/DL — SIGNIFICANT CHANGE UP (ref 3.3–5)
ALP SERPL-CCNC: 110 U/L — SIGNIFICANT CHANGE UP (ref 40–120)
ALT FLD-CCNC: 19 U/L — SIGNIFICANT CHANGE UP (ref 10–45)
ANION GAP SERPL CALC-SCNC: 10 MMOL/L — SIGNIFICANT CHANGE UP (ref 5–17)
APTT BLD: 30.4 SEC — SIGNIFICANT CHANGE UP (ref 27.5–36.3)
AST SERPL-CCNC: 50 U/L — HIGH (ref 10–40)
BASOPHILS # BLD AUTO: 0.06 K/UL — SIGNIFICANT CHANGE UP (ref 0–0.2)
BASOPHILS NFR BLD AUTO: 1.2 % — SIGNIFICANT CHANGE UP (ref 0–2)
BILIRUB SERPL-MCNC: 0.4 MG/DL — SIGNIFICANT CHANGE UP (ref 0.2–1.2)
BUN SERPL-MCNC: 16 MG/DL — SIGNIFICANT CHANGE UP (ref 7–23)
CALCIUM SERPL-MCNC: 9 MG/DL — SIGNIFICANT CHANGE UP (ref 8.4–10.5)
CHLORIDE SERPL-SCNC: 103 MMOL/L — SIGNIFICANT CHANGE UP (ref 96–108)
CO2 SERPL-SCNC: 23 MMOL/L — SIGNIFICANT CHANGE UP (ref 22–31)
CREAT SERPL-MCNC: 0.76 MG/DL — SIGNIFICANT CHANGE UP (ref 0.5–1.3)
EOSINOPHIL # BLD AUTO: 0.36 K/UL — SIGNIFICANT CHANGE UP (ref 0–0.5)
EOSINOPHIL NFR BLD AUTO: 7 % — HIGH (ref 0–6)
GLUCOSE SERPL-MCNC: 122 MG/DL — HIGH (ref 70–99)
HCT VFR BLD CALC: 33.7 % — LOW (ref 39–50)
HGB BLD-MCNC: 10.7 G/DL — LOW (ref 13–17)
IMM GRANULOCYTES NFR BLD AUTO: 0.2 % — SIGNIFICANT CHANGE UP (ref 0–1.5)
INR BLD: 1 RATIO — SIGNIFICANT CHANGE UP (ref 0.88–1.16)
LYMPHOCYTES # BLD AUTO: 1.27 K/UL — SIGNIFICANT CHANGE UP (ref 1–3.3)
LYMPHOCYTES # BLD AUTO: 24.9 % — SIGNIFICANT CHANGE UP (ref 13–44)
MCHC RBC-ENTMCNC: 29.7 PG — SIGNIFICANT CHANGE UP (ref 27–34)
MCHC RBC-ENTMCNC: 31.8 GM/DL — LOW (ref 32–36)
MCV RBC AUTO: 93.6 FL — SIGNIFICANT CHANGE UP (ref 80–100)
MONOCYTES # BLD AUTO: 0.55 K/UL — SIGNIFICANT CHANGE UP (ref 0–0.9)
MONOCYTES NFR BLD AUTO: 10.8 % — SIGNIFICANT CHANGE UP (ref 2–14)
NEUTROPHILS # BLD AUTO: 2.86 K/UL — SIGNIFICANT CHANGE UP (ref 1.8–7.4)
NEUTROPHILS NFR BLD AUTO: 55.9 % — SIGNIFICANT CHANGE UP (ref 43–77)
NRBC # BLD: 0 /100 WBCS — SIGNIFICANT CHANGE UP (ref 0–0)
PLATELET # BLD AUTO: 156 K/UL — SIGNIFICANT CHANGE UP (ref 150–400)
POTASSIUM SERPL-MCNC: 5 MMOL/L — SIGNIFICANT CHANGE UP (ref 3.5–5.3)
POTASSIUM SERPL-SCNC: 5 MMOL/L — SIGNIFICANT CHANGE UP (ref 3.5–5.3)
PROT SERPL-MCNC: 7.1 G/DL — SIGNIFICANT CHANGE UP (ref 6–8.3)
PROTHROM AB SERPL-ACNC: 11.4 SEC — SIGNIFICANT CHANGE UP (ref 10–12.9)
RBC # BLD: 3.6 M/UL — LOW (ref 4.2–5.8)
RBC # FLD: 14.6 % — HIGH (ref 10.3–14.5)
SARS-COV-2 RNA SPEC QL NAA+PROBE: SIGNIFICANT CHANGE UP
SODIUM SERPL-SCNC: 136 MMOL/L — SIGNIFICANT CHANGE UP (ref 135–145)
WBC # BLD: 5.11 K/UL — SIGNIFICANT CHANGE UP (ref 3.8–10.5)
WBC # FLD AUTO: 5.11 K/UL — SIGNIFICANT CHANGE UP (ref 3.8–10.5)

## 2020-06-29 PROCEDURE — 73630 X-RAY EXAM OF FOOT: CPT | Mod: 26,RT

## 2020-06-29 PROCEDURE — 99285 EMERGENCY DEPT VISIT HI MDM: CPT | Mod: CS,GC

## 2020-06-29 PROCEDURE — 93970 EXTREMITY STUDY: CPT | Mod: 26

## 2020-06-29 RX ORDER — NYSTATIN CREAM 100000 [USP'U]/G
1 CREAM TOPICAL ONCE
Refills: 0 | Status: COMPLETED | OUTPATIENT
Start: 2020-06-29 | End: 2020-06-29

## 2020-06-29 RX ORDER — POTASSIUM CHLORIDE 20 MEQ
20 PACKET (EA) ORAL DAILY
Refills: 0 | Status: DISCONTINUED | OUTPATIENT
Start: 2020-06-29 | End: 2020-07-06

## 2020-06-29 RX ORDER — ASPIRIN/CALCIUM CARB/MAGNESIUM 324 MG
81 TABLET ORAL DAILY
Refills: 0 | Status: DISCONTINUED | OUTPATIENT
Start: 2020-06-29 | End: 2020-07-06

## 2020-06-29 RX ORDER — ENOXAPARIN SODIUM 100 MG/ML
40 INJECTION SUBCUTANEOUS DAILY
Refills: 0 | Status: DISCONTINUED | OUTPATIENT
Start: 2020-06-29 | End: 2020-07-03

## 2020-06-29 RX ORDER — LEVOTHYROXINE SODIUM 125 MCG
137 TABLET ORAL DAILY
Refills: 0 | Status: DISCONTINUED | OUTPATIENT
Start: 2020-06-29 | End: 2020-07-06

## 2020-06-29 RX ORDER — PIPERACILLIN AND TAZOBACTAM 4; .5 G/20ML; G/20ML
3.38 INJECTION, POWDER, LYOPHILIZED, FOR SOLUTION INTRAVENOUS ONCE
Refills: 0 | Status: COMPLETED | OUTPATIENT
Start: 2020-06-29 | End: 2020-06-29

## 2020-06-29 RX ORDER — PIPERACILLIN AND TAZOBACTAM 4; .5 G/20ML; G/20ML
3.38 INJECTION, POWDER, LYOPHILIZED, FOR SOLUTION INTRAVENOUS EVERY 8 HOURS
Refills: 0 | Status: DISCONTINUED | OUTPATIENT
Start: 2020-06-29 | End: 2020-07-04

## 2020-06-29 RX ORDER — ACETAMINOPHEN 500 MG
650 TABLET ORAL ONCE
Refills: 0 | Status: COMPLETED | OUTPATIENT
Start: 2020-06-29 | End: 2020-06-29

## 2020-06-29 RX ORDER — SIMVASTATIN 20 MG/1
40 TABLET, FILM COATED ORAL AT BEDTIME
Refills: 0 | Status: DISCONTINUED | OUTPATIENT
Start: 2020-06-29 | End: 2020-07-06

## 2020-06-29 RX ORDER — FUROSEMIDE 40 MG
40 TABLET ORAL DAILY
Refills: 0 | Status: DISCONTINUED | OUTPATIENT
Start: 2020-06-29 | End: 2020-07-01

## 2020-06-29 RX ADMIN — Medication 81 MILLIGRAM(S): at 18:02

## 2020-06-29 RX ADMIN — Medication 650 MILLIGRAM(S): at 23:11

## 2020-06-29 RX ADMIN — PIPERACILLIN AND TAZOBACTAM 25 GRAM(S): 4; .5 INJECTION, POWDER, LYOPHILIZED, FOR SOLUTION INTRAVENOUS at 22:17

## 2020-06-29 RX ADMIN — Medication 20 MILLIEQUIVALENT(S): at 18:02

## 2020-06-29 RX ADMIN — Medication 40 MILLIGRAM(S): at 18:02

## 2020-06-29 RX ADMIN — Medication 650 MILLIGRAM(S): at 10:13

## 2020-06-29 RX ADMIN — SIMVASTATIN 40 MILLIGRAM(S): 20 TABLET, FILM COATED ORAL at 22:16

## 2020-06-29 RX ADMIN — PIPERACILLIN AND TAZOBACTAM 200 GRAM(S): 4; .5 INJECTION, POWDER, LYOPHILIZED, FOR SOLUTION INTRAVENOUS at 13:02

## 2020-06-29 RX ADMIN — NYSTATIN CREAM 1 APPLICATION(S): 100000 CREAM TOPICAL at 10:13

## 2020-06-29 NOTE — H&P ADULT - NSHPLABSRESULTS_GEN_ALL_CORE
LABS:                        10.7   5.11  )-----------( 156      ( 29 Jun 2020 09:34 )             33.7     06-29    136  |  103  |  16  ----------------------------<  122<H>  5.0   |  23  |  0.76    Ca    9.0      29 Jun 2020 09:34    TPro  7.1  /  Alb  3.8  /  TBili  0.4  /  DBili  x   /  AST  50<H>  /  ALT  19  /  AlkPhos  110  06-29    PT/INR - ( 29 Jun 2020 09:34 )   PT: 11.4 sec;   INR: 1.00 ratio         PTT - ( 29 Jun 2020 09:34 )  PTT:30.4 sec        RADIOLOGY & ADDITIONAL TESTS:

## 2020-06-29 NOTE — DISCHARGE NOTE PROVIDER - NSDCMRMEDTOKEN_GEN_ALL_CORE_FT
acetaminophen 325 mg oral tablet: 2 tab(s) orally every 6 hours, As needed, Temp greater or equal to 38C (100.4F), Mild Pain (1 - 3)  aspirin 81 mg oral tablet, chewable: 1 tab(s) orally once a day  Augmentin 875 mg-125 mg oral tablet: 1 tab(s) orally 2 times a day   cefuroxime 500 mg oral tablet: 1 tab(s) orally every 12 hours  doxycycline hyclate 100 mg oral capsule: 1 cap(s) orally 2 times a day  to continue after other abx completed in 6 more days on 12/13   ezetimibe-simvastatin 10 mg-40 mg oral tablet: 1 tab(s) orally once a day  sulfamethoxazole-trimethoprim 800 mg-160 mg oral tablet: 2 tab(s) orally 2 times a day  Synthroid 137 mcg (0.137 mg) oral tablet: 1 tab(s) orally once a day Allegra 24 Hour Allergy oral tablet: 1 tab(s) orally once a day  aspirin 81 mg oral tablet, chewable: 1 tab(s) orally once a day  ezetimibe-simvastatin 10 mg-40 mg oral tablet: 1 tab(s) orally once a day  Synthroid 137 mcg (0.137 mg) oral tablet: 1 tab(s) orally once a day  Vitamin C 500 mg oral tablet: 1 tab(s) orally once a day  Vitamin D3 2000 intl units (50 mcg) oral capsule: 1 cap(s) orally once a day Allegra 24 Hour Allergy oral tablet: 1 tab(s) orally once a day  aspirin 81 mg oral tablet, chewable: 1 tab(s) orally once a day  cilostazol 100 mg oral tablet: 1 tab(s) orally 2 times a day  clobetasol 0.05% topical ointment: 1 application topically 2 times a day  ezetimibe-simvastatin 10 mg-40 mg oral tablet: 1 tab(s) orally once a day  furosemide 20 mg oral tablet: 1 tab(s) orally once a day  hydrOXYzine hydrochloride 25 mg oral tablet: 1 tab(s) orally 2 times a day, As needed, Itching  ketoconazole 2% topical cream: Apply topically to affected area 2 times a day to both groins  losartan 50 mg oral tablet: 1 tab(s) orally once a day  metoprolol succinate 50 mg oral tablet, extended release: 1 tab(s) orally once a day  phenylephrine 0.25%-3% rectal ointment: 1 application rectal 3 times a day, As needed, Itching/Burning.  potassium chloride 20 mEq oral tablet, extended release: 1 tab(s) orally once a day  Synthroid 137 mcg (0.137 mg) oral tablet: 1 tab(s) orally once a day  Vitamin C 500 mg oral tablet: 1 tab(s) orally once a day  Vitamin D3 2000 intl units (50 mcg) oral capsule: 1 cap(s) orally once a day

## 2020-06-29 NOTE — DISCHARGE NOTE PROVIDER - HOSPITAL COURSE
74 yo M with pmh CAD, s/p stent, PPM, prior Afib no ac, hypothyroid and left TKR. Presented to ED with right foot swelling and pain. Patient was seen in ER on 6/10, was started on Augmentin w/o improvement.          Admit with dx Right foot cellulitis, upper torso and right arm rash, and right groin rash.     Podiatry had consulted on patient in ED and found right foot tinea pedis with interdigital maceration but no open wounds or streaking cellulitis. 74 yo M with pmh CAD, s/p stent, PPM, prior Afib no ac, hypothyroid and left TKR. Presented to ED with right foot swelling and pain. Patient was seen in ER on 6/10, was started on Augmentin w/o improvement.          Admit with dx Right foot cellulitis, upper torso, right arm, right groin rash.     Podiatry consulted on patient in ED and dx with right foot tinea pedis with interdigital maceration, no open wounds or streaking cellulitis. Low suspicion for osteomyelitis.    Patient had multiple specialty consults that included Podiatry, Cardiology, ID, Vascular sgy, 76 yo M with pmh CAD, s/p stent, PPM, prior Afib no ac, hypothyroid and left TKR. Presented to ED with right foot swelling and pain. Patient was seen in ER on 6/10, was started on Augmentin w/o improvement.          Admit with dx Right foot cellulitis, upper torso, right arm, right groin rash.     Podiatry consulted in ED; dx with right foot tinea pedis with interdigital maceration, no open wounds or streaking cellulitis. Low suspicion for osteomyelitis.    Patient had multiple specialty consults that included Podiatry, Cardiology, ID, Vascular sgy, GI, and Dermatology.     GI saw for anemia Hg range 10.7-11.6; plan is for outpatient colonoscopy.     Dermatology consulted 2/2 multiple skin issues resulting in dx of stasis dermatitis, Intertrigo candida in B/L groin, general skin rash on torso & BUE, and tinea pedis; All of which are being treated with various topical agents and improving.     ID followed; treated with IV abx x6 days though minimal improvement with leg; ID concluded that the problem with leg is more a non-infectious process; doubt cellulitis.     Vascular sgy performed RLE angiogram; findings indicated small vessel dz and venous insufficiency in RLE. Podiatry recalled but deemed no podiatric surgical intervention needed; advised patient to f/u with Dr Fonseca at wound care clinic. Patient stated to me that he does not want f/up with Dr Fonseca and asked me if there were any other Podiatrist that he could follow up with; I referred him to Dr Tosha Pena.     Patient medically cleared for discharge to home with home care by Dr Mills.

## 2020-06-29 NOTE — H&P ADULT - ASSESSMENT
75 year old male PMH PPM, CAD s/p stent on ASA, A-fib, hypothyroid, and total left knee replacement p/w R foot swelling and pain. Patient states this has been worsening for past 1 year. Seen in ER 6/10 and started on Augmentin x10 days without improvement. Visiting nurse has been bathing foot w/ vinegar and warm water but discharge and bleeding has been increasing. Spoke w/ Dr. Lainez who would like the patient admitted to Dr. Washington. Patient also notes rash across the upper torso for past week, pruritic on R arm, and a rash in the groin. Patient denies f/c, cp, sob, abd pain, nausea, vomiting, diarrhea, constipation, or weakness.    Plan: 75 year old male PMH PPM, CAD s/p stent on ASA, A-fib, hypothyroid, and total left knee replacement p/w R foot swelling and pain. Patient states this has been worsening for past 1 year. Seen in ER 6/10 and started on Augmentin x10 days without improvement. Visiting nurse has been bathing foot w/ vinegar and warm water but discharge and bleeding has been increasing. Spoke w/ Dr. Lainez who would like the patient admitted to Dr. Washington. Patient also notes rash across the upper torso for past week, pruritic on R arm, and a rash in the groin. Patient denies f/c, cp, sob, abd pain, nausea, vomiting, diarrhea, constipation, or weakness.       Plan: 75 year old male PMH PPM, CAD s/p stent on ASA, A-fib, hypothyroid, and total left knee replacement p/w R foot swelling and pain. Patient states this has been worsening for past 1 year. Seen in ER 6/10 and started on Augmentin x10 days without improvement. Visiting nurse has been bathing foot w/ vinegar and warm water but discharge and bleeding has been increasing. Spoke w/ Dr. Lainez who would like the patient admitted to Dr. Washington. Patient also notes rash across the upper torso for past week, pruritic on R arm, and a rash in the groin. Patient denies f/c, cp, sob, abd pain, nausea, vomiting, diarrhea, constipation, or weakness.       Plan: Likely right foot cellulits affecting toes. IV Zosyn. Failed Augmentin. Wound care consult called. Podiatry will follow.   Sees wound care at home. ESR moderate elevation 54, foot x-rays negative for OM.     IVP Lasix 40 mg/day for bilateral leg edema. TTE to eval LV function. LE venous dopplers ordered. Continue Zocur 40 mg/day for HLD   and ASA 81 mg/day for prior stent in 2007. Had PPM placed in the past, no MRI is possible.      Continue Synthyoid 137 mcg/day, TSH in AM.

## 2020-06-29 NOTE — ED PROVIDER NOTE - ATTENDING CONTRIBUTION TO CARE
75y M hx of PPM, CAD w stents, Afib, hypothyroid, L TKR here with c/o persistent R foot redness and swelling, ongoing x1 year. Seen for same in ED June 10th, podiatry saw pt at that time, and rec Augmentin x10 days, vinegar soaks. Visiting nurse has been doing vinegar soaks, completed abx. Since completing abx has noted a rash to his upper chest, back, BL UE, which is maculopapular and pruritic in some places, no mucous memb involvement, no fevers, suspect drug rash. Also with fungal rash to groin which is new in past week. States that he feels the redness and swelling in his R foot 75y M hx of PPM, CAD w stents, Afib, hypothyroid, L TKR here with c/o persistent R foot redness and swelling, ongoing x1 year. Seen for same in ED June 10th, podiatry saw pt at that time, and rec Nystatin powder w gauze between toes, Augmentin x10 days, vinegar soaks. Visiting nurse has been doing vinegar soaks, completed abx, not doing antifungal. Since completing abx has noted a rash to his upper chest, back, BL UE, which is maculopapular and pruritic in some places, no mucous memb involvement, no fevers, suspect drug rash, not SJS. Also with fungal appearing rash to groin/scrotal sac which is new in past week. States that he feels the redness and swelling in his R foot are worse. He states foot is painful and causes him trouble ambulating. There is weeping and crusting fluid between the toes. Spoke to his PCP today who sent pt in for admission. Will check labs, inflam markers, Xrays to r/o osteo though unlikely. Cellulitis vs fungal infection of R foot. Pod re-c/s.

## 2020-06-29 NOTE — DISCHARGE NOTE PROVIDER - NSDCCPCAREPLAN_GEN_ALL_CORE_FT
PRINCIPAL DISCHARGE DIAGNOSIS  Diagnosis: Stasis dermatitis  Assessment and Plan of Treatment: You were evaluated by Dermatology and diagnosed with stasis dermatitis to your right lower extremity.  Daily wound care to right foot x2 weeks as follow:  After shower, dry foot and between toes, apply clobetasol cream to affected areas on right foot, moisturize foot with vaseline after cream applied, cover with dry gauze, wrap loosely with citlali.  Continue with wear surgical shoes and bear weight on your right toot as tolerated.  Follow up with Dermatology and/or Podiatry in 1-2 weeks. Call for appointment.      SECONDARY DISCHARGE DIAGNOSES  Diagnosis: Peripheral vascular disease  Assessment and Plan of Treatment: On 7/6 you had a right lower extremity angiogram.  You were found to have peripheral venous insufficiency in your right leg.  No further interventions required at this time.  Follow up with your primary healthcare provider in 1-2 weeks. Call for appointment.    Diagnosis: Skin rash  Assessment and Plan of Treatment: Rash on trunk and arms evaluated by Dermatology.  Apply Clobetasol cream to trunk and arms x1 week.  Follow up with Dermatology in 1-2 weeks.    Diagnosis: Candidiasis, intertrigo  Assessment and Plan of Treatment: Diagnosed by Dermatology.  After daily shower, apply ketoconazole cream to rash in both groins x1 week.  Follow up with Dermatology in 1-2 weeks. Call for appointment.    Diagnosis: Tinea pedis of right foot  Assessment and Plan of Treatment: Evaluated by Podiatry and Dermatology.  Continue with treatment as noted above.  Follow up with Dermatology and/or Podiatry in 1-2 weeks.    Diagnosis: Anemia  Assessment and Plan of Treatment: Condition stable.  Follow up with your primary healthcare provider in 2-3 weeks for repeat blood work. PRINCIPAL DISCHARGE DIAGNOSIS  Diagnosis: Stasis dermatitis  Assessment and Plan of Treatment: You were evaluated by Dermatology and diagnosed with stasis dermatitis to your right lower extremity.  Daily wound care to right foot x2 weeks as follow:  After shower, dry foot and between toes, apply clobetasol cream to affected areas on right foot, moisturize foot with vaseline after cream applied, cover with dry gauze, wrap loosely with citlali.  Continue with wear surgical shoes and bear weight on your right toot as tolerated.  Follow up with Dermatology and/or Podiatry in 1-2 weeks. Call for appointment.      SECONDARY DISCHARGE DIAGNOSES  Diagnosis: Peripheral vascular disease  Assessment and Plan of Treatment: On 7/6 you had a right lower extremity angiogram.  You were found to have peripheral venous insufficiency in your right leg.  No further interventions required at this time.  Follow up with your primary healthcare provider in 1-2 weeks. Call for appointment.    Diagnosis: Skin rash  Assessment and Plan of Treatment: Rash on trunk and arms evaluated by Dermatology.  Apply Clobetasol cream to trunk and arms x1 week.  Follow up with Dermatology in 1-2 weeks.    Diagnosis: Candidiasis, intertrigo  Assessment and Plan of Treatment: Diagnosed by Dermatology.  After daily shower, apply ketoconazole cream to rash in both groins x1 week.  Follow up with Dermatology in 1-2 weeks. Call for appointment.    Diagnosis: Tinea pedis of right foot  Assessment and Plan of Treatment: Evaluated by Podiatry and Dermatology.  Continue with treatment as noted above.  Follow up with Dermatology and/or Podiatry in 1-2 weeks.    Diagnosis: Atrial fibrillation  Assessment and Plan of Treatment: Continue with Metoprolol and follow up with your primary healthcare provider.    Diagnosis: HTN (hypertension)  Assessment and Plan of Treatment: Low salt diet  Activity as tolerated.  Take all medication as prescribed.  Follow up with your medical doctor for routine blood pressure monitoring at your next visit.  Notify your doctor if you have any of the following symptoms:   Dizziness, Lightheadedness, Blurry vision, Headache, Chest pain, Shortness of breath    Diagnosis: Anemia  Assessment and Plan of Treatment: Condition stable.  Follow up with your primary healthcare provider in 2-3 weeks for repeat blood work.

## 2020-06-29 NOTE — ED PROVIDER NOTE - CLINICAL SUMMARY MEDICAL DECISION MAKING FREE TEXT BOX
Kelton Bolivar, PGY1: 75 year old male p/w R foot pain and swelling after failing outpatient wound care and abx. Patient w/ interdigital macerated wound w/ crusted lesions, no gross discharge or bleeding. ROM limited 2/2 swelling and pain. Patient afebrile, does not appear septic. Wound does not appear grossly infected, doubt osteomyelitis given lack of systemic sx and recent normal XR. Rash likely drug eruption given recent use of Augmentin. Plan for labs, podiatry cs, admission for further management.

## 2020-06-29 NOTE — DISCHARGE NOTE PROVIDER - CARE PROVIDER_API CALL
Roni Lainez  CARDIOVASCULAR DISEASE  [address] [place] [id number]  Phone: [phone]  Fax: [phone]  Follow Up Time:     Tosha Rene (DPM)  Surgery  [address] [place] [id number]  Phone: [phone]  Fax: [phone]  Follow Up Time:

## 2020-06-29 NOTE — ED PROVIDER NOTE - OBJECTIVE STATEMENT
75 year old male PMH PM, CAD s/p stent on ASA, A-fib, hypothyroid, and PSHx total left knee replacement p/w R foot swelling and pain. Seen in ER last week and started on antifungals. Visiting nurse was bathing foot w/ vinegar and warm water. Discharge has been increasing, bleeding also increasing. Spoke w/ Dr. Lainez who would like the patient admitted to Dr. Washington.     PCP- Dr. Lainez  Podiatry- Dr. Karen Schumer  Wound care- Dr. Fonseca 75 year old male PMH PM, CAD s/p stent on ASA, A-fib, hypothyroid, and PSHx total left knee replacement p/w R foot swelling and pain. Patient states this has been worsening for past 1 year. Seen in ER 6/10 and started on Augmentin x10 days. Visiting nurse has been bathing foot w/ vinegar and warm water but discharge and bleeding has been increasing. Spoke w/ Dr. Lainez who would like the patient admitted to Dr. Washington. Patient also notes rash across the upper torso for past week, pruritic on R arm, and a rash in the groin. Patient denies f/c, cp, sob, abd pain, nausea, vomiting, diarrhea, constipation, or weakness.     PCP- Dr. Lainez  Podiatry- Dr. Karen Schumer  Wound care- Dr. Fonseca

## 2020-06-29 NOTE — ED PROVIDER NOTE - PROGRESS NOTE DETAILS
DH: Podiatry paged and will see patient in ER. DH: Podiatry resident at bedside. Recommended improved wound care routine w/ Betadine, avoiding moisture, and continued wound care. Podiatry resident d/w Dr. Lainez who is adamant about admission. Will d/w Dr. Washington for admission.

## 2020-06-29 NOTE — DISCHARGE NOTE PROVIDER - NSDCFUADDINST_GEN_ALL_CORE_FT
Podiatry Discharge Instructions:  Follow up: Please follow up with Dr. Bj Fonseca within 1 week of discharge from the hospital, please call [phone] for appointment and discuss that you recently were seen in the hospital.  Wound Care: Please make daily dressing change to right foot maceration with application of betadine, Aquacel, 4x4 gauze and Geraldo.  Weight bearing: Please weight bear as tolerated in a surgical shoe.  Antibiotics: Please continue as instructed. Activity as tolerated

## 2020-06-29 NOTE — ED PROVIDER NOTE - NS ED ROS FT
GENERAL: no fever, no chills  EYES: no change in vision  HEENT: no trouble swallowing or speaking  CARDIAC: no chest pain, no palpitations   PULMONARY: no cough, no shortness of breath, no wheezing  GI: no abdominal pain, no nausea, no vomiting, no diarrhea, no constipation  : no changes in urination, no dysuria  SKIN: +rash, +R foot wound  NEURO: no headache, no numbness, no weakness  MSK: +R foot pain, no muscle pain, no back pain, no calf pain     -Kelton Bolivar, PGY-1

## 2020-06-29 NOTE — H&P ADULT - NSICDXPASTMEDICALHX_GEN_ALL_CORE_FT
PAST MEDICAL HISTORY:  Afib     CAD (coronary artery disease)     Hypothyroid     Pacemaker     Varicose vein of leg had surgery in 1993

## 2020-06-29 NOTE — ED ADULT NURSE REASSESSMENT NOTE - NS ED NURSE REASSESS COMMENT FT1
3 [facility] contacted to give report. Reports nurse is busy with another new ED admit and will call back to take report.

## 2020-06-29 NOTE — ED ADULT NURSE REASSESSMENT NOTE - NS ED NURSE REASSESS COMMENT FT1
Pt. resting comfortably in stretcher in no acute distress. Pt. provided coffee. Awaiting COVID result and bed placement. VSS. Will continue to reassess.

## 2020-06-29 NOTE — ED PROVIDER NOTE - PHYSICAL EXAMINATION
GENERAL: A&Ox3, non-toxic appearing, no acute distress  HEENT: NCAT, EOMI, oral mucosa moist, normal conjunctiva  RESP: CTAB, no respiratory distress, no wheezes/rhonchi/rales, speaking in full sentences  CV: RRR, no murmurs/rubs/gallops, 2+ pitting edema  ABDOMEN: soft, non-tender, non-distended, no guarding  MSK: no visible deformities, R foot limited ROM 2/2 pain and swelling   NEURO: no focal sensory or motor deficits, MAEx4  SKIN: warm, normal color, well perfused, macular rash diffusely in upper torso sparing the abdomen w/ blanching, R foot interdigital maceration w/ crusted lesions, groin intertriginous erythema   PSYCH: normal affect    -Kelton Bolivar, PGY-1 GENERAL: A&Ox3, non-toxic appearing, no acute distress  HEENT: NCAT, EOMI, oral mucosa moist, normal conjunctiva  RESP: CTAB, no respiratory distress, no wheezes/rhonchi/rales, speaking in full sentences  CV: RRR, no murmurs/rubs/gallops, 2+ pitting edema  ABDOMEN: soft, non-tender, non-distended, no guarding  MSK: no visible deformities, R foot limited ROM 2/2 pain and swelling   NEURO: dysconjugate gaze w/ L eye outward deviation, L eye w/ anisocoria, MAEx4  SKIN: warm, normal color, well perfused, macular rash diffusely in upper torso sparing the abdomen w/ blanching, R foot interdigital maceration w/ crusted lesions, groin intertriginous erythema   PSYCH: normal affect    -Kelton Bolivar, PGY-1

## 2020-06-29 NOTE — CONSULT NOTE ADULT - ASSESSMENT
74 yo M w/ RF wheeping interdigital maceration   - pt seen and evaulated in ED  - Pt known to Podiatry Service. RF interdigital maceration/fungal infection  - Right foot tinea pedis with interdigital maceration but no open wounds or streaking cellulitis.   - Mechanical debridement with gauze interdigitally and applied betadine splint and dressing  - No culture taken as superficial dermatological skin infection and In office wound culture 6/4 growing PsA, MSSA, E Faecalis   - Podiatry recommends outpatient follow up at wound care center this Thursday with Dr. Fonseca and with Dr. Jason Melton, dermatologist with PO   - Podiatry resident and Dr. Fonseca discussed with Dr. Lainez PCP and still insist on admission for osteomyelitis work up.  - Clinical presentations and findings, labs and repeated Xrays confirming no concern for osteomyelitis and admission is not warranted and within reason to which the ED medical attending and resident are in agreement.  - Patient admitted under medicine as PCP insists against podiatry recs  - Rec IV Unasyn or Zosyn while admitted and rec PO Diflucan while inpatient  - Nursing order for daily local wound care with betadine and aquacel + DSD  - D/w Dr Fonseca in agreement of plan 76 yo M w/ RF wheeping interdigital maceration   - pt seen and evaulated in ED  - Afebrile, WBC 5, ESR 54, CRP 0.60  - Pt known to Podiatry Service. RF interdigital maceration/fungal infection  - Right foot tinea pedis with interdigital maceration but no open wounds or streaking cellulitis.   - Mechanical debridement with gauze interdigitally and applied betadine splint and dressing  - No culture taken as superficial dermatological skin infection and In office wound culture 6/4 growing PsA, MSSA, E Faecalis   - Podiatry recommends outpatient follow up at wound care center this Thursday with Dr. Fonseca and with Dr. Jason Melton, dermatologist with PO   - Podiatry resident and Dr. Fonseca discussed with Dr. Lainez PCP and still insist on admission for osteomyelitis work up.  - Clinical presentations and findings, labs and repeated Xrays confirming no concern for osteomyelitis and admission is not warranted and within reason to which the ED medical attending and resident are in agreement. No further osteomyelitis work up from pod standpoint.  - Patient admitted under medicine as PCP insists against podiatry recs  - Right foot advanced tinea pedis requiring proper local wound care  - Rec IV Unasyn or Zosyn while admitted and rec PO Diflucan while inpatient  - Nursing order for daily local wound care with betadine and aquacel + DSD  - D/w Dr Fonseca in agreement of plan 76 yo M w/ RF wheeping interdigital maceration   - pt seen and evaulated in ED  - Afebrile, WBC 5, ESR 54, CRP 0.60  - Pt known to Podiatry Service. RF interdigital maceration/fungal infection  - Right foot tinea pedis with interdigital maceration but no open wounds or streaking cellulitis.   - Mechanical debridement with gauze interdigitally and applied betadine splint and dressing  - No culture taken as superficial dermatological skin infection and In office wound culture 6/4 growing PsA, MSSA, E Faecalis   - Podiatry recommends outpatient follow up at wound care center this Thursday with Dr. Fonseca and with Dr. Jason Melton, dermatologist with PO   - Podiatry resident and Dr. Fonseca discussed with Dr. Lainez PCP and still insist on admission for osteomyelitis work up.  - Clinical presentations and findings, labs and repeated Xrays confirming no concern for osteomyelitis and admission is not warranted and within reason to which the ED medical attending and resident are in agreement. No further osteomyelitis work up from pod standpoint.  - Patient admitted under medicine as PCP insists against podiatry recs  - Rec dermatology consult  - Rec IV Unasyn or Zosyn while admitted and rec PO Diflucan while inpatient  - Nursing order for daily local wound care with betadine and aquacel + DSD  - D/w Dr Fonseca in agreement of plan

## 2020-06-29 NOTE — ED PROVIDER NOTE - PMH
Afib    CAD (coronary artery disease)    Hypothyroid    Pacemaker    Varicose vein of leg  had surgery in 1993

## 2020-06-29 NOTE — H&P ADULT - NSHPPHYSICALEXAM_GEN_ALL_CORE
PHYSICAL EXAMINATION:  Vital Signs Last 24 Hrs  T(C): 36.6 (29 Jun 2020 10:19), Max: 36.9 (29 Jun 2020 08:39)  T(F): 97.8 (29 Jun 2020 10:19), Max: 98.4 (29 Jun 2020 08:39)  HR: 61 (29 Jun 2020 10:19) (61 - 107)  BP: 120/55 (29 Jun 2020 10:19) (120/55 - 165/79)  BP(mean): --  RR: 18 (29 Jun 2020 10:19) (18 - 20)  SpO2: 100% (29 Jun 2020 10:19) (99% - 100%)  CAPILLARY BLOOD GLUCOSE          GENERAL: NAD, well-groomed, well-developed  HEAD:  atraumatic, normocephalic  EYES: sclera anicteric  ENMT: mucous membranes moist  NECK: supple, No JVD  CHEST/LUNG: clear to auscultation bilaterally; no rales, rhonchi, or wheezing b/l  HEART: normal S1, S2  ABDOMEN: BS+, soft, ND, NT   EXTREMITIES:  pulses palpable; no clubbing, cyanosis, or edema b/l LEs  NEURO: awake, alert, interactive; moves all extremities  SKIN: no rashes or lesions PHYSICAL EXAMINATION:  Vital Signs Last 24 Hrs  T(C): 36.6 (29 Jun 2020 10:19), Max: 36.9 (29 Jun 2020 08:39)  T(F): 97.8 (29 Jun 2020 10:19), Max: 98.4 (29 Jun 2020 08:39)  HR: 61 (29 Jun 2020 10:19) (61 - 107)  BP: 120/55 (29 Jun 2020 10:19) (120/55 - 165/79)  BP(mean): --  RR: 18 (29 Jun 2020 10:19) (18 - 20)  SpO2: 100% (29 Jun 2020 10:19) (99% - 100%)  CAPILLARY BLOOD GLUCOSE          GENERAL: NAD, seen in ER, comfortable, no fevers or SOB  HEAD:  atraumatic, normocephalic  EYES: sclera anicteric  ENMT: mucous membranes moist  NECK: supple, No JVD  CHEST/LUNG: clear to auscultation bilaterally; no rales, rhonchi, or wheezing b/l  HEART: normal S1, S2  ABDOMEN: BS+, soft, ND, NT   EXTREMITIES:  pulses palpable; no clubbing, cyanosis, 1 plus edema b/l LEs  with red toes right foot  NEURO: awake, alert, interactive; moves all extremities  SKIN: no rashes or lesions

## 2020-06-29 NOTE — CONSULT NOTE ADULT - SUBJECTIVE AND OBJECTIVE BOX
Podiatry pager #: [phone]/ [phone]    Patient is a 75y old  Male who presents with a chief complaint of right foot interdigital infection    HPI:    75 year old male PMH PM, CAD s/p stent on ASA, A-fib, hypothyroid, and PSHx total left knee replacement p/w R foot swelling and pain. Patient states this has been worsening for past 1 year. Seen in ER 6/10 and started on Augmentin x10 days. Visiting nurse has been bathing foot w/ vinegar and warm water but discharge and bleeding has been increasing. Spoke w/ Dr. Lainez who would like the patient admitted to Dr. Washington. Patient also notes rash across the upper torso for past week, pruritic on R arm, and a rash in the groin. Patient denies f/c, cp, sob, abd pain, nausea, vomiting, diarrhea, constipation, or weakness.     Patient admitting that visiting nurse has been performing daily foot soaking with vinegar but no application of antiseptic agent interdigitally. Pt last seen at wound care center with Dr. Fonseca.	    PAST MEDICAL & SURGICAL HISTORY:  Pacemaker  Hypothyroid  Varicose vein of leg: had surgery in 1993  CAD (coronary artery disease)  Afib  History of total left knee replacement (TKR)      MEDICATIONS  (STANDING):    MEDICATIONS  (PRN):      Allergies    afluzosin (Hives)  levofloxacin (Hives)  Myrbetriq (Hives)  tamsulosin (Hives)    Intolerances        VITALS:    Vital Signs Last 24 Hrs  T(C): 36.6 (29 Jun 2020 10:19), Max: 36.9 (29 Jun 2020 08:39)  T(F): 97.8 (29 Jun 2020 10:19), Max: 98.4 (29 Jun 2020 08:39)  HR: 61 (29 Jun 2020 10:19) (61 - 107)  BP: 120/55 (29 Jun 2020 10:19) (120/55 - 165/79)  BP(mean): --  RR: 18 (29 Jun 2020 10:19) (18 - 20)  SpO2: 100% (29 Jun 2020 10:19) (99% - 100%)    LABS:                          10.7   5.11  )-----------( 156      ( 29 Jun 2020 09:34 )             33.7       06-29    136  |  103  |  16  ----------------------------<  122<H>  5.0   |  23  |  0.76    Ca    9.0      29 Jun 2020 09:34    TPro  7.1  /  Alb  3.8  /  TBili  0.4  /  DBili  x   /  AST  50<H>  /  ALT  19  /  AlkPhos  110  06-29      CAPILLARY BLOOD GLUCOSE          PT/INR - ( 29 Jun 2020 09:34 )   PT: 11.4 sec;   INR: 1.00 ratio         PTT - ( 29 Jun 2020 09:34 )  PTT:30.4 sec    LOWER EXTREMITY PHYSICAL EXAM:    Vascular: DP/PT 2/4, B/L, CFT <3 seconds B/L, Temperature gradient warm to warmB/L, + 2 pitting edema b/l LE  Neuro: Epicritic sensation intact to the level of digits B/L.  Musculoskeletal/Ortho: no gross deformities   Skin: venous stasis dermatitis b/l LE, Right foot w/ interdigital maceration and erythema to midfoot with mild serous drainage.  No signs of open lesions or wounds, no purulence noted, no streaking cellulitis.  Right lower extremity and ankle pruritus.    RADIOLOGY & ADDITIONAL STUDIES:    < from: Xray Foot AP + Lateral + Oblique, Right (06.29.20 @ 11:05) >    EXAM:  FOOT COMPLETE RIGHT (MIN 3 VIEW)                            PROCEDURE DATE:  06/29/2020            INTERPRETATION:      CLINICAL INDICATION: Right foot pain and swelling. Question osteomyelitis.  TECHNIQUE: AP, oblique, and lateral radiographs of the right foot.    COMPARISON: Right foot radiographs 10 Pam 2020.    FINDINGS:    No acute fracture or dislocation. No osseous erosion or periosteal new bone formation. Degenerative changes of the midfoot including the talonavicular and calcaneonavicular joints. Cartilage spaces are otherwise maintained. Normal alignment of the tarsometatarsal joints. Soft tissue swelling over the dorsum of the foot. No tracking subcutaneous emphysema. No radiopaque foreign body.        IMPRESSION:  No radiographic evidence of advanced osteomyelitis.                    RD RED M.D., ATTENDING RADIOLOGIST  This document has been electronically signed. Jun 29 2020 11:24AM              < end of copied text >

## 2020-06-29 NOTE — ED ADULT NURSE NOTE - OBJECTIVE STATEMENT
76 y/o male coming in from home complaining of right foot infection. AOx4, ambulates with a cane, PMH pacemaker, stent, CAD, afib, left knee replacement. patient states he was in the ER approx. 1 week ago with the same complaint of right foot infection. Reports he was discharged on antibiotics and saw his podiatrist. Patient reports infection has been getting increasingly worse. Reports pain 5/10 with standing and ambulation, 3/10 at rest. Right foot appears swollen, skin is cracking in multiple spots. Patient denies any other complaints. Denies chest pain, abdominal pain, fever, chills, SOB. VSS. Will continue to reassess.

## 2020-06-29 NOTE — H&P ADULT - HISTORY OF PRESENT ILLNESS
75 year old male PMH PPM, CAD s/p stent on ASA, A-fib, hypothyroid, and total left knee replacement p/w R foot swelling and pain. Patient states this has been worsening for past 1 year. Seen in ER 6/10 and started on Augmentin x10 days without improvement. Visiting nurse has been bathing foot w/ vinegar and warm water but discharge and bleeding has been increasing. Spoke w/ Dr. Lainez who would like the patient admitted to Dr. Washington. Patient also notes rash across the upper torso for past week, pruritic on R arm, and a rash in the groin. Patient denies f/c, cp, sob, abd pain, nausea, vomiting, diarrhea, constipation, or weakness.     Podiatry/Wound care- Dr. Fonseca 75 year old male PMH PPM, CAD s/p stent on ASA, prior A-fib not on AC, hypothyroid, and total left knee replacement p/w R foot swelling and pain. Patient states this has been worsening for past 1 year. Seen in ER 6/10 and started on Augmentin x10 days without improvement. Visiting nurse has been bathing foot w/ vinegar and warm water but discharge and bleeding has been increasing.     Spoke w/ Dr. Lainez who would like the patient admitted to Dr. Washington for right foot cellulitis. Patient also notes rash across the upper torso for past week, pruritic on R arm, and a rash in the groin. Patient denies f/c, cp, sob, abd pain, nausea, vomiting, diarrhea, constipation, or weakness.     Podiatry/Wound care- Dr. Fonseca

## 2020-06-30 LAB
ANION GAP SERPL CALC-SCNC: 12 MMOL/L — SIGNIFICANT CHANGE UP (ref 5–17)
BUN SERPL-MCNC: 17 MG/DL — SIGNIFICANT CHANGE UP (ref 7–23)
CALCIUM SERPL-MCNC: 9 MG/DL — SIGNIFICANT CHANGE UP (ref 8.4–10.5)
CHLORIDE SERPL-SCNC: 104 MMOL/L — SIGNIFICANT CHANGE UP (ref 96–108)
CO2 SERPL-SCNC: 26 MMOL/L — SIGNIFICANT CHANGE UP (ref 22–31)
CREAT SERPL-MCNC: 1.05 MG/DL — SIGNIFICANT CHANGE UP (ref 0.5–1.3)
GLUCOSE SERPL-MCNC: 92 MG/DL — SIGNIFICANT CHANGE UP (ref 70–99)
HCV AB S/CO SERPL IA: 0.19 S/CO — SIGNIFICANT CHANGE UP (ref 0–0.99)
HCV AB SERPL-IMP: SIGNIFICANT CHANGE UP
POTASSIUM SERPL-MCNC: 3.9 MMOL/L — SIGNIFICANT CHANGE UP (ref 3.5–5.3)
POTASSIUM SERPL-SCNC: 3.9 MMOL/L — SIGNIFICANT CHANGE UP (ref 3.5–5.3)
SODIUM SERPL-SCNC: 142 MMOL/L — SIGNIFICANT CHANGE UP (ref 135–145)
TSH SERPL-MCNC: 2.92 UIU/ML — SIGNIFICANT CHANGE UP (ref 0.27–4.2)

## 2020-06-30 PROCEDURE — 99232 SBSQ HOSP IP/OBS MODERATE 35: CPT

## 2020-06-30 PROCEDURE — 93306 TTE W/DOPPLER COMPLETE: CPT | Mod: 26

## 2020-06-30 RX ORDER — ACETAMINOPHEN 500 MG
650 TABLET ORAL ONCE
Refills: 0 | Status: COMPLETED | OUTPATIENT
Start: 2020-06-30 | End: 2020-06-30

## 2020-06-30 RX ADMIN — SIMVASTATIN 40 MILLIGRAM(S): 20 TABLET, FILM COATED ORAL at 21:27

## 2020-06-30 RX ADMIN — PIPERACILLIN AND TAZOBACTAM 25 GRAM(S): 4; .5 INJECTION, POWDER, LYOPHILIZED, FOR SOLUTION INTRAVENOUS at 21:28

## 2020-06-30 RX ADMIN — PIPERACILLIN AND TAZOBACTAM 25 GRAM(S): 4; .5 INJECTION, POWDER, LYOPHILIZED, FOR SOLUTION INTRAVENOUS at 14:08

## 2020-06-30 RX ADMIN — Medication 81 MILLIGRAM(S): at 11:59

## 2020-06-30 RX ADMIN — Medication 137 MICROGRAM(S): at 05:49

## 2020-06-30 RX ADMIN — PIPERACILLIN AND TAZOBACTAM 25 GRAM(S): 4; .5 INJECTION, POWDER, LYOPHILIZED, FOR SOLUTION INTRAVENOUS at 05:49

## 2020-06-30 RX ADMIN — Medication 40 MILLIGRAM(S): at 17:50

## 2020-06-30 RX ADMIN — Medication 650 MILLIGRAM(S): at 06:14

## 2020-06-30 RX ADMIN — ENOXAPARIN SODIUM 40 MILLIGRAM(S): 100 INJECTION SUBCUTANEOUS at 11:59

## 2020-06-30 RX ADMIN — Medication 20 MILLIEQUIVALENT(S): at 11:59

## 2020-06-30 NOTE — PROGRESS NOTE ADULT - SUBJECTIVE AND OBJECTIVE BOX
afebrile    REVIEW OF SYSTEMS:  GEN: no fever,    no chills  RESP: no SOB,   no cough  CVS: no chest pain,   no palpitations  GI: no abdominal pain,   no nausea,   no vomiting,   no constipation,   no diarrhea  : no dysuria,   no frequency  NEURO: no headache,   no dizziness  PSYCH: no depression,   not anxious  Derm : no rash    MEDICATIONS  (STANDING):  aspirin enteric coated 81 milliGRAM(s) Oral daily  enoxaparin Injectable 40 milliGRAM(s) SubCutaneous daily  furosemide   Injectable 40 milliGRAM(s) IV Push daily  levothyroxine 137 MICROGram(s) Oral daily  piperacillin/tazobactam IVPB.. 3.375 Gram(s) IV Intermittent every 8 hours  potassium chloride    Tablet ER 20 milliEquivalent(s) Oral daily  simvastatin 40 milliGRAM(s) Oral at bedtime    MEDICATIONS  (PRN):      Vital Signs Last 24 Hrs  T(C): 36.3 (30 Jun 2020 05:40), Max: 36.9 (29 Jun 2020 08:39)  T(F): 97.3 (30 Jun 2020 05:40), Max: 98.4 (29 Jun 2020 08:39)  HR: 68 (30 Jun 2020 05:40) (59 - 107)  BP: 115/61 (30 Jun 2020 05:40) (115/61 - 165/79)  BP(mean): --  RR: 18 (30 Jun 2020 05:40) (16 - 20)  SpO2: 96% (30 Jun 2020 05:40) (96% - 100%)  CAPILLARY BLOOD GLUCOSE        I&O's Summary    29 Jun 2020 07:01  -  30 Jun 2020 07:00  --------------------------------------------------------  IN: 920 mL / OUT: 2650 mL / NET: -1730 mL        PHYSICAL EXAM:  HEAD:  Atraumatic, Normocephalic  NECK: Supple, No   JVD  CHEST/LUNG:   no     rales,     no,    rhonchi  HEART: Regular rate and rhythm;         murmur  ABDOMEN: Soft, Nontender, ;   EXTREMITIES: right  foot  cellulitis  NEUROLOGY:  alert    LABS:                        10.7   5.11  )-----------( 156      ( 29 Jun 2020 09:34 )             33.7     06-30    142  |  104  |  17  ----------------------------<  92  3.9   |  26  |  1.05    Ca    9.0      30 Jun 2020 06:30    TPro  7.1  /  Alb  3.8  /  TBili  0.4  /  DBili  x   /  AST  50<H>  /  ALT  19  /  AlkPhos  110  06-29    PT/INR - ( 29 Jun 2020 09:34 )   PT: 11.4 sec;   INR: 1.00 ratio         PTT - ( 29 Jun 2020 09:34 )  PTT:30.4 sec                        Consultant(s) Notes Reviewed:      Care Discussed with Consultants/Other Providers:

## 2020-06-30 NOTE — CONSULT NOTE ADULT - ASSESSMENT
Anemia   R foot cellulitis    - suspect anemia related to acute blood loss from R foot + hemorrhoidal bleed  - patient refusing anusol suppository  - miralax prn to keep stools soft  - diet as tolerated  - monitor cbc  - outpatient colonoscopy with Dr. Frank  - rest of care per primary team   - d/w patient

## 2020-06-30 NOTE — CONSULT NOTE ADULT - ASSESSMENT
75 year old male PMH PPM, CAD s/p stent on ASA, prior A-fib not on AC, hypothyroid, and total left knee replacement p/w R foot swelling and pain. Patient states this has been worsening for past 1 year. Seen in ER 6/10 and started on Augmentin x10 days without improvement. Visiting nurse has been bathing foot w/ vinegar and warm water but discharge and bleeding has been increasing.   pt with hx of sss, ?paf , s/p ppm, cad, who presented to ER with possible cellulitis with LE edema  continue all cardiac meds  will check ppm if +for a.fib will discuss with Dr Lainez ?ac  will adjust cardiac meds  asa daily  abx as per MEDICINE

## 2020-06-30 NOTE — PROGRESS NOTE ADULT - ASSESSMENT
75 year old male         PMH PPM, CAD s/p stent on ASA, A-fib, hypothyroid, and total left knee replacement      p/w R foot swelling and pain.     Patient states this has been worsening for past 1 year.  on  augmentin,  with  no  improvement   Patient also notes rash across the upper torso for past week, pruritic on R arm, and a rash in the groin         right foot cellulits affecting toes. on   IV Zosyn.     Podiatry will follow.   , foot x-rays negative for OM.     IVP Lasix ,   for bilateral leg edema.    TTE to eval LV function. LE venous dopplers ordered.     ASA   prior stent in 2007./   AFIB, PPM   h/o  afib,  no  a/c//  not  sure  why ?/    card  eval  Anemia,  gi  eval 75 year old male         PMH PPM, CAD s/p stent on ASA, A-fib, hypothyroid, and total left knee replacement      p/w R foot swelling and pain.     Patient states this has been worsening for past 1 year.  on  augmentin,  with  no  improvement   Patient also notes rash across the upper torso for past week, pruritic on R arm, and a rash in the groin         right foot cellulits affecting toes., on   IV Zosyn.     Podiatry will follow.   , foot x-rays negative for OM.     IVP Lasix ,   for bilateral leg edema.    TTE to eval LV function.   LE venous dopplers ordered.     ASA   prior stent in 2007./   AFIB, PPM   h/o  afib,  no  a/c//  not  sure  why ?/    card  eval  Anemia,  gi  eval dr joann sanchez  will interrogate  PPM/ if  pt  has  afib, as stored   event, , then  will need  to start  a/c/  pt  made  aware   of  risks  out  pt  card is  dr mikayla dick 75 year old male         PMH PPM, CAD s/p stent on ASA, A-fib, hypothyroid, and total left knee replacement      p/w R foot swelling and pain.     Patient states this has been worsening for past 1 year.  on  augmentin,  with  no  improvement   Patient also notes rash across the upper torso for past week, pruritic on R arm, and a rash in the groin         right foot cellulits affecting toes., on   IV Zosyn.     Podiatry will follow.   , foot x-rays negative for OM.     IVP Lasix ,   for bilateral leg edema.    TTE to eval LV function.   LE venous dopplers, no  dvt      ASA   prior stent in 2007./   AFIB, PPM   h/o  AFIB,    no  a/c//  not  sure  why ?/    card  eval  Anemia,  gi  eval dr joann sanchez  will interrogate  PPM/ if  pt  has  afib, as stored   event, , then  will need  to start  a/c/  pt  made  aware   of  risks  out  pt  card is  dr mikayla dick  vascular  eval/  has  small  area  of  dry gangrene, toe

## 2020-06-30 NOTE — CONSULT NOTE ADULT - SUBJECTIVE AND OBJECTIVE BOX
CHIEF COMPLAINT:Patient is a 75y old  Male who presents with a chief complaint of     HPI:  75 year old male PMH PPM, CAD s/p stent on ASA, prior A-fib not on AC, hypothyroid, and total left knee replacement p/w R foot swelling and pain. Patient states this has been worsening for past 1 year. Seen in ER 6/10 and started on Augmentin x10 days without improvement. Visiting nurse has been bathing foot w/ vinegar and warm water but discharge and bleeding has been increasing.     PAST MEDICAL & SURGICAL HISTORY:  Pacemaker  Hypothyroid  Varicose vein of leg: had surgery in 1993  CAD (coronary artery disease)  Afib  History of total left knee replacement (TKR)      MEDICATIONS  (STANDING):  aspirin enteric coated 81 milliGRAM(s) Oral daily  enoxaparin Injectable 40 milliGRAM(s) SubCutaneous daily  furosemide   Injectable 40 milliGRAM(s) IV Push daily  levothyroxine 137 MICROGram(s) Oral daily  piperacillin/tazobactam IVPB.. 3.375 Gram(s) IV Intermittent every 8 hours  potassium chloride    Tablet ER 20 milliEquivalent(s) Oral daily  simvastatin 40 milliGRAM(s) Oral at bedtime    MEDICATIONS  (PRN):      FAMILY HISTORY:  No pertinent family history in first degree relatives      SOCIAL HISTORY:    [ ] Non-smoker  [ ] Smoker  [ ] Alcohol    Allergies    afluzosin (Hives)  levofloxacin (Hives)  Myrbetriq (Hives)  tamsulosin (Hives)    Intolerances    	    REVIEW OF SYSTEMS:  CONSTITUTIONAL: No fever, weight loss, or fatigue  EYES: No eye pain, visual disturbances, or discharge  ENT:  No difficulty hearing, tinnitus, vertigo; No sinus or throat pain  NECK: No pain or stiffness  RESPIRATORY: No cough, wheezing, chills or hemoptysis; + Shortness of Breath  CARDIOVASCULAR: No chest pain, palpitations, passing out, dizziness, or leg swelling  GASTROINTESTINAL: No abdominal or epigastric pain. No nausea, vomiting, or hematemesis; No diarrhea or constipation. No melena or hematochezia.  GENITOURINARY: No dysuria, frequency, hematuria, or incontinence  NEUROLOGICAL: No headaches, memory loss, loss of strength, numbness, or tremors  SKIN: No itching, burning, rashes, or lesions   LYMPH Nodes: No enlarged glands  ENDOCRINE: No heat or cold intolerance; No hair loss  MUSCULOSKELETAL: No joint pain or swelling; No muscle, back, + extremity pain  PSYCHIATRIC: No depression, anxiety, mood swings, or difficulty sleeping  HEME/LYMPH: No easy bruising, or bleeding gums  ALLERGY AND IMMUNOLOGIC: No hives or eczema	    [ ] All others negative	  [ ] Unable to obtain    PHYSICAL EXAM:  T(C): 36.3 (06-30-20 @ 05:40), Max: 36.9 (06-29-20 @ 08:39)  HR: 68 (06-30-20 @ 05:40) (59 - 107)  BP: 115/61 (06-30-20 @ 05:40) (115/61 - 165/79)  RR: 18 (06-30-20 @ 05:40) (16 - 20)  SpO2: 96% (06-30-20 @ 05:40) (96% - 100%)  Wt(kg): --  I&O's Summary    29 Jun 2020 07:01  -  30 Jun 2020 07:00  --------------------------------------------------------  IN: 920 mL / OUT: 2650 mL / NET: -1730 mL        Appearance: Normal	  HEENT:   Normal oral mucosa, PERRL, EOMI	  Lymphatic: No lymphadenopathy  Cardiovascular: Normal S1 S2, No JVD, + murmurs, + edema  Respiratory: Lungs clear to auscultation	  Psychiatry: A & O x 3, Mood & affect appropriate  Gastrointestinal:  Soft, Non-tender, + BS	  Skin: No rashes, No ecchymoses, No cyanosis	  Neurologic: Non-focal  Extremities: Normal range of motion, No clubbing, cyanosis . + edema  Vascular: Peripheral pulses palpable 2+ bilaterally    TELEMETRY: 	    ECG:  	  RADIOLOGY:  OTHER: 	  	  LABS:	 	    CARDIAC MARKERS:                              10.7   5.11  )-----------( 156      ( 29 Jun 2020 09:34 )             33.7     06-30    142  |  104  |  17  ----------------------------<  92  3.9   |  26  |  1.05    Ca    9.0      30 Jun 2020 06:30    TPro  7.1  /  Alb  3.8  /  TBili  0.4  /  DBili  x   /  AST  50<H>  /  ALT  19  /  AlkPhos  110  06-29    proBNP:   Lipid Profile:   HgA1c:   TSH:   PT/INR - ( 29 Jun 2020 09:34 )   PT: 11.4 sec;   INR: 1.00 ratio         PTT - ( 29 Jun 2020 09:34 )  PTT:30.4 sec    PREVIOUS DIAGNOSTIC TESTING:    < from: 12 Lead ECG (12.04.18 @ 22:01) >  Diagnosis Line NORMAL SINUS RHYTHM  MINIMAL VOLTAGE CRITERIA FOR LVH, MAY BE NORMAL VARIANT  BORDERLINE ECG    < from: CT Abdomen and Pelvis w/wo IV Cont (05.11.18 @ 10:02) >  Horseshoe kidney.    A 4 mm nonobstructing calculus within the left moiety.    < from: Xray Chest 2 Views PA/Lat (12.04.18 @ 21:53) >  INTERPRETATION:  CLINICAL INFORMATION: Abnormal chest sounds.    TECHNIQUE: PA and lateral radiographs of the chest.    COMPARISON: Chest x-ray 1/10/2012.    FINDINGS:    LUNGS AND PLEURA: The lungs are clear. No pleural effusion or   pneumothorax.    HEART AND MEDIASTINUM: Heart size is within normal limits. Left chest   wall dual-lead pacemaker.    SKELETON: Degenerative changes of the spine.      IMPRESSION:     Clear lungs.    < end of copied text >

## 2020-06-30 NOTE — CONSULT NOTE ADULT - SUBJECTIVE AND OBJECTIVE BOX
Chief Complaint:  Patient is a 75y old  Male who presents with a chief complaint of cellulitis (30 Jun 2020 07:57)      HPI: 75 year old male PMH PPM, CAD s/p stent on ASA, prior A-fib not on AC, hypothyroid, hx hemorrhoids and total left knee replacement p/w R foot swelling and pain. Patient states this has been worsening for past 1 year. Seen in ER 6/10 and started on Augmentin x10 days without improvement. Visiting nurse has been bathing foot w/ vinegar and warm water but discharge and bleeding has been increasing. Of note, patient reports history of hemorrhoids requiring rubber band ligation in the 1970's. He states over the last two weeks he has had two episodes of BRBPR, but has since resolved. He last underwent colonoscopy 5 years ago with Dr. Conor Martin and was found to have 4-5 polyps which were resected. He recently started seeing a new gastroenterologist, Dr. Radha Frank, and is scheduled for outpatient appointment on July 6th. Patient denies CP, SOB, fever, chills, nausea, vomiting, abdominal pain, decreased appetite, unintentional weight loss, melena, hematochezia.     Allergies:  afluzosin (Hives)  levofloxacin (Hives)  Myrbetriq (Hives)  tamsulosin (Hives)      Medications:  aspirin enteric coated 81 milliGRAM(s) Oral daily  enoxaparin Injectable 40 milliGRAM(s) SubCutaneous daily  furosemide   Injectable 40 milliGRAM(s) IV Push daily  levothyroxine 137 MICROGram(s) Oral daily  piperacillin/tazobactam IVPB.. 3.375 Gram(s) IV Intermittent every 8 hours  potassium chloride    Tablet ER 20 milliEquivalent(s) Oral daily  simvastatin 40 milliGRAM(s) Oral at bedtime      PMHX/PSHX:  Pacemaker  Hypothyroid  Varicose vein of leg  CAD (coronary artery disease)  Afib  History of total left knee replacement (TKR)      Family history:  No pertinent family history in first degree relatives      Social History:     ROS:     General:  No wt loss, fevers, chills, night sweats, fatigue,   Eyes:  Good vision, no reported pain  ENT:  No sore throat, pain, runny nose, dysphagia  CV:  No pain, palpitations, hypo/hypertension  Resp:  No dyspnea, cough, tachypnea, wheezing  GI:  No pain, No nausea, No vomiting, No diarrhea, No constipation, No weight loss, No fever, No pruritis, No rectal bleeding, No tarry stools, No dysphagia,  :  No pain, bleeding, incontinence, nocturia  Muscle:  No pain, weakness  Neuro:  No weakness, tingling, memory problems  Psych:  No fatigue, insomnia, mood problems, depression  Endocrine:  No polyuria, polydipsia, cold/heat intolerance  Heme:  No petechiae, ecchymosis, easy bruisability  Skin:  No rash, tattoos, scars, edema      PHYSICAL EXAM:   Vital Signs:  Vital Signs Last 24 Hrs  T(C): 36.5 (30 Jun 2020 12:07), Max: 36.9 (29 Jun 2020 21:49)  T(F): 97.7 (30 Jun 2020 12:07), Max: 98.4 (29 Jun 2020 21:49)  HR: 61 (30 Jun 2020 12:07) (59 - 68)  BP: 111/63 (30 Jun 2020 12:07) (111/63 - 142/77)  BP(mean): --  RR: 18 (30 Jun 2020 12:07) (16 - 18)  SpO2: 94% (30 Jun 2020 12:07) (94% - 100%)  Daily     Daily     GENERAL: , no distress  HEENT:  NC/AT  ABDOMEN:  Soft, non-tender, non-distended, normoactive bowel sounds  EXTEREMITIES:  R foot cellulitis, b/l LE edema   NEURO:  Alert, oriented, no asterixis, no tremor, no encephalopathy    LABS:                        10.7   5.11  )-----------( 156      ( 29 Jun 2020 09:34 )             33.7     06-30    142  |  104  |  17  ----------------------------<  92  3.9   |  26  |  1.05    Ca    9.0      30 Jun 2020 06:30    TPro  7.1  /  Alb  3.8  /  TBili  0.4  /  DBili  x   /  AST  50<H>  /  ALT  19  /  AlkPhos  110  06-29    LIVER FUNCTIONS - ( 29 Jun 2020 09:34 )  Alb: 3.8 g/dL / Pro: 7.1 g/dL / ALK PHOS: 110 U/L / ALT: 19 U/L / AST: 50 U/L / GGT: x           PT/INR - ( 29 Jun 2020 09:34 )   PT: 11.4 sec;   INR: 1.00 ratio         PTT - ( 29 Jun 2020 09:34 )  PTT:30.4 sec        Imaging:

## 2020-07-01 DIAGNOSIS — B35.3 TINEA PEDIS: ICD-10-CM

## 2020-07-01 DIAGNOSIS — D64.9 ANEMIA, UNSPECIFIED: ICD-10-CM

## 2020-07-01 DIAGNOSIS — I83.893 VARICOSE VEINS OF BILATERAL LOWER EXTREMITIES WITH OTHER COMPLICATIONS: ICD-10-CM

## 2020-07-01 DIAGNOSIS — Z71.89 OTHER SPECIFIED COUNSELING: ICD-10-CM

## 2020-07-01 DIAGNOSIS — I77.1 STRICTURE OF ARTERY: ICD-10-CM

## 2020-07-01 DIAGNOSIS — I87.2 VENOUS INSUFFICIENCY (CHRONIC) (PERIPHERAL): ICD-10-CM

## 2020-07-01 DIAGNOSIS — L03.115 CELLULITIS OF RIGHT LOWER LIMB: ICD-10-CM

## 2020-07-01 LAB
ANION GAP SERPL CALC-SCNC: 11 MMOL/L — SIGNIFICANT CHANGE UP (ref 5–17)
BLD GP AB SCN SERPL QL: NEGATIVE — SIGNIFICANT CHANGE UP
BUN SERPL-MCNC: 22 MG/DL — SIGNIFICANT CHANGE UP (ref 7–23)
CALCIUM SERPL-MCNC: 8.9 MG/DL — SIGNIFICANT CHANGE UP (ref 8.4–10.5)
CHLORIDE SERPL-SCNC: 101 MMOL/L — SIGNIFICANT CHANGE UP (ref 96–108)
CO2 SERPL-SCNC: 26 MMOL/L — SIGNIFICANT CHANGE UP (ref 22–31)
CREAT SERPL-MCNC: 1.06 MG/DL — SIGNIFICANT CHANGE UP (ref 0.5–1.3)
GLUCOSE SERPL-MCNC: 100 MG/DL — HIGH (ref 70–99)
HCT VFR BLD CALC: 33.2 % — LOW (ref 39–50)
HGB BLD-MCNC: 10.7 G/DL — LOW (ref 13–17)
MCHC RBC-ENTMCNC: 29.6 PG — SIGNIFICANT CHANGE UP (ref 27–34)
MCHC RBC-ENTMCNC: 32.2 GM/DL — SIGNIFICANT CHANGE UP (ref 32–36)
MCV RBC AUTO: 92 FL — SIGNIFICANT CHANGE UP (ref 80–100)
NRBC # BLD: 0 /100 WBCS — SIGNIFICANT CHANGE UP (ref 0–0)
PLATELET # BLD AUTO: 149 K/UL — LOW (ref 150–400)
POTASSIUM SERPL-MCNC: 3.7 MMOL/L — SIGNIFICANT CHANGE UP (ref 3.5–5.3)
POTASSIUM SERPL-SCNC: 3.7 MMOL/L — SIGNIFICANT CHANGE UP (ref 3.5–5.3)
RBC # BLD: 3.61 M/UL — LOW (ref 4.2–5.8)
RBC # FLD: 14.6 % — HIGH (ref 10.3–14.5)
RH IG SCN BLD-IMP: NEGATIVE — SIGNIFICANT CHANGE UP
SODIUM SERPL-SCNC: 138 MMOL/L — SIGNIFICANT CHANGE UP (ref 135–145)
WBC # BLD: 5.09 K/UL — SIGNIFICANT CHANGE UP (ref 3.8–10.5)
WBC # FLD AUTO: 5.09 K/UL — SIGNIFICANT CHANGE UP (ref 3.8–10.5)

## 2020-07-01 PROCEDURE — 99232 SBSQ HOSP IP/OBS MODERATE 35: CPT

## 2020-07-01 PROCEDURE — 99223 1ST HOSP IP/OBS HIGH 75: CPT

## 2020-07-01 PROCEDURE — 99221 1ST HOSP IP/OBS SF/LOW 40: CPT

## 2020-07-01 PROCEDURE — 93923 UPR/LXTR ART STDY 3+ LVLS: CPT | Mod: 26

## 2020-07-01 PROCEDURE — 71045 X-RAY EXAM CHEST 1 VIEW: CPT | Mod: 26

## 2020-07-01 RX ORDER — SODIUM CHLORIDE 9 MG/ML
1000 INJECTION INTRAMUSCULAR; INTRAVENOUS; SUBCUTANEOUS
Refills: 0 | Status: DISCONTINUED | OUTPATIENT
Start: 2020-07-01 | End: 2020-07-02

## 2020-07-01 RX ORDER — METOPROLOL TARTRATE 50 MG
25 TABLET ORAL DAILY
Refills: 0 | Status: DISCONTINUED | OUTPATIENT
Start: 2020-07-01 | End: 2020-07-02

## 2020-07-01 RX ORDER — NYSTATIN CREAM 100000 [USP'U]/G
1 CREAM TOPICAL THREE TIMES A DAY
Refills: 0 | Status: DISCONTINUED | OUTPATIENT
Start: 2020-07-01 | End: 2020-07-04

## 2020-07-01 RX ORDER — HYDROXYZINE HCL 10 MG
25 TABLET ORAL
Refills: 0 | Status: DISCONTINUED | OUTPATIENT
Start: 2020-07-01 | End: 2020-07-06

## 2020-07-01 RX ORDER — FUROSEMIDE 40 MG
20 TABLET ORAL DAILY
Refills: 0 | Status: DISCONTINUED | OUTPATIENT
Start: 2020-07-01 | End: 2020-07-06

## 2020-07-01 RX ADMIN — Medication 25 MILLIGRAM(S): at 06:56

## 2020-07-01 RX ADMIN — Medication 20 MILLIEQUIVALENT(S): at 11:49

## 2020-07-01 RX ADMIN — Medication 20 MILLIGRAM(S): at 08:33

## 2020-07-01 RX ADMIN — Medication 81 MILLIGRAM(S): at 11:49

## 2020-07-01 RX ADMIN — PIPERACILLIN AND TAZOBACTAM 25 GRAM(S): 4; .5 INJECTION, POWDER, LYOPHILIZED, FOR SOLUTION INTRAVENOUS at 05:32

## 2020-07-01 RX ADMIN — PIPERACILLIN AND TAZOBACTAM 25 GRAM(S): 4; .5 INJECTION, POWDER, LYOPHILIZED, FOR SOLUTION INTRAVENOUS at 21:33

## 2020-07-01 RX ADMIN — Medication 137 MICROGRAM(S): at 05:32

## 2020-07-01 RX ADMIN — NYSTATIN CREAM 1 APPLICATION(S): 100000 CREAM TOPICAL at 17:33

## 2020-07-01 RX ADMIN — PIPERACILLIN AND TAZOBACTAM 25 GRAM(S): 4; .5 INJECTION, POWDER, LYOPHILIZED, FOR SOLUTION INTRAVENOUS at 13:00

## 2020-07-01 RX ADMIN — SIMVASTATIN 40 MILLIGRAM(S): 20 TABLET, FILM COATED ORAL at 21:33

## 2020-07-01 RX ADMIN — NYSTATIN CREAM 1 APPLICATION(S): 100000 CREAM TOPICAL at 21:32

## 2020-07-01 RX ADMIN — ENOXAPARIN SODIUM 40 MILLIGRAM(S): 100 INJECTION SUBCUTANEOUS at 11:50

## 2020-07-01 NOTE — CONSULT NOTE ADULT - SUBJECTIVE AND OBJECTIVE BOX
Vascular Surgery Consult Note    Patient is a 75y old  Male who presents with a chief complaint of cellulitis (30 Jun 2020 07:57)      HPI:  75 year old male PMH PPM, CAD s/p stent on ASA, prior A-fib not on AC, hypothyroid, and total left knee replacement p/w R foot swelling and pain. Patient states this has been worsening for past 1 year. Seen in ER 6/10 and started on Augmentin x10 days without improvement. Visiting nurse has been bathing foot w/ vinegar and warm water but discharge and bleeding has been increasing.     Patient discussed his concern for this increasing discharge with his PMD, Dr. Lainez who recommended admission. Patient is also very closely followed by Dr. Fonseca from Podiatric wound care both as an outpatient and during this hospitalization for this chronic RLE infection.   PRENATAL/BIRTH HISTORY:      PAST MEDICAL & SURGICAL HISTORY:  Pacemaker  Hypothyroid  Varicose vein of leg: had surgery in 1993  CAD (coronary artery disease)  Afib  History of total left knee replacement (TKR)      FAMILY HISTORY:  No pertinent family history in first degree relatives      SOCIAL HISTORY:    MEDICATIONS  (STANDING):  aspirin enteric coated 81 milliGRAM(s) Oral daily  enoxaparin Injectable 40 milliGRAM(s) SubCutaneous daily  furosemide    Tablet 20 milliGRAM(s) Oral daily  levothyroxine 137 MICROGram(s) Oral daily  metoprolol succinate ER 25 milliGRAM(s) Oral daily  piperacillin/tazobactam IVPB.. 3.375 Gram(s) IV Intermittent every 8 hours  potassium chloride    Tablet ER 20 milliEquivalent(s) Oral daily  simvastatin 40 milliGRAM(s) Oral at bedtime    MEDICATIONS  (PRN):    Allergies    afluzosin (Hives)  levofloxacin (Hives)  Myrbetriq (Hives)  tamsulosin (Hives)    Intolerances        REVIEW OF SYSTEMS  All review of systems negative except for those marked.  Systemic:	[ ] Fever		[ ] Chills		[ ] Night sweats		[ ] Fatigue	[ ] Other  [] Cardiovascular:  [] Pulmonary:  [] Renal/Urologic:  [] Gastrointestinal:  [] Metabolic:  [] Neurologic:  [] Hematologic:  [] ENT:  [] Ophthalmologic:  [] Musculoskeletal:      Vital Signs Last 24 Hrs  T(C): 36.6 (01 Jul 2020 10:49), Max: 36.7 (30 Jun 2020 19:44)  T(F): 97.8 (01 Jul 2020 10:49), Max: 98.1 (30 Jun 2020 19:44)  HR: 92 (01 Jul 2020 10:49) (59 - 96)  BP: 116/59 (01 Jul 2020 10:49) (116/59 - 127/70)  BP(mean): --  RR: 18 (01 Jul 2020 10:49) (18 - 18)  SpO2: 94% (01 Jul 2020 10:49) (94% - 94%)  Daily     Daily     PHYSICAL EXAM:  General Appearance:	NAD, awake and alert    Psychological: Cooperative with exam  			  Head: NCAT    Eyes: Anicteric, no conjunctival injection    ENT: No rhinorrhea    Cardiovascular: RRR, 	  	  Pulmonary: Clear bilaterally  		  Thorax:	No chest wall deformities 	  		  GI/Abdomen: Soft, ND, NT	  	  Skin: No rash, no erythema		  	  Cutaneous/ Musculoskeletal: No deformities, moving all extremities. RLE interdigital space macerated and erythematous. No purulent discharge or wily bleeding. No cellulitic streaking    Vascular: PT/DP pulses were palpable in both lower extremities. Sensation intact b/l upper and lower extremities.   			  LABORATORY VALUES                        10.7   5.09  )-----------( 149      ( 01 Jul 2020 06:39 )             33.2     07-01    138  |  101  |  22  ----------------------------<  100<H>  3.7   |  26  |  1.06    Ca    8.9      01 Jul 2020 06:39        Assessment: 76 y/o male with chronic non-resolving interdigital tenia infection of the right lower extremity. Concern for small vessel arterial disease      Plan:    Recommend ELVIS/PVR of b/l lower extremities to assess arterial sufficiency   Continue wound care as per Podiatric wound care team  Discussed with Dr. Sharma, attending vascular surgeon    If questions please call vascular surgery team    [phone] Vascular Surgery Consult Note    Patient is a 75y old  Male who presents with a chief complaint of cellulitis (30 Jun 2020 07:57)      HPI:  75 year old male PMH PPM, CAD s/p stent on ASA, prior A-fib not on AC, hypothyroid, and total left knee replacement p/w R foot swelling and pain. Patient states this has been worsening for past 1 year. Seen in ER 6/10 and started on Augmentin x10 days without improvement. Visiting nurse has been bathing foot w/ vinegar and warm water but discharge and bleeding has been increasing.     Patient discussed his concern for this increasing discharge with his PMD, Dr. Lainez who recommended admission. Patient is also very closely followed by Dr. Fonseca from Podiatric wound care both as an outpatient and during this hospitalization for this chronic RLE infection.     Pt c/o nightly nocturnal leg and foot cramps   pt also c/o josh le swelling and ongoing  sig drainage  from the right foot wounds when he ambulates     PRENATAL/BIRTH HISTORY:      PAST MEDICAL & SURGICAL HISTORY:  Pacemaker  Hypothyroid  Varicose vein of leg: had surgery in 1993  CAD (coronary artery disease)  Afib  History of total left knee replacement (TKR)      FAMILY HISTORY:  No pertinent family history in first degree relatives      SOCIAL HISTORY:    MEDICATIONS  (STANDING):  aspirin enteric coated 81 milliGRAM(s) Oral daily  enoxaparin Injectable 40 milliGRAM(s) SubCutaneous daily  furosemide    Tablet 20 milliGRAM(s) Oral daily  levothyroxine 137 MICROGram(s) Oral daily  metoprolol succinate ER 25 milliGRAM(s) Oral daily  piperacillin/tazobactam IVPB.. 3.375 Gram(s) IV Intermittent every 8 hours  potassium chloride    Tablet ER 20 milliEquivalent(s) Oral daily  simvastatin 40 milliGRAM(s) Oral at bedtime    MEDICATIONS  (PRN):    Allergies    afluzosin (Hives)  levofloxacin (Hives)  Myrbetriq (Hives)  tamsulosin (Hives)    Intolerances      REVIEW OF SYSTEMS  All review of systems negative except for those marked.  Systemic:	[ ] Fever		[ ] Chills		[ ] Night sweats		[ ] Fatigue	[ ] Other  [] Cardiovascular:  [] Pulmonary:  [] Renal/Urologic:  [] Gastrointestinal:  [] Metabolic:  [] Neurologic:  [] Hematologic:  [] ENT:  [] Ophthalmologic:  [] Musculoskeletal:      Vital Signs Last 24 Hrs  T(C): 36.6 (01 Jul 2020 10:49), Max: 36.7 (30 Jun 2020 19:44)  T(F): 97.8 (01 Jul 2020 10:49), Max: 98.1 (30 Jun 2020 19:44)  HR: 92 (01 Jul 2020 10:49) (59 - 96)  BP: 116/59 (01 Jul 2020 10:49) (116/59 - 127/70)  BP(mean): --  RR: 18 (01 Jul 2020 10:49) (18 - 18)  SpO2: 94% (01 Jul 2020 10:49) (94% - 94%)  Daily     Daily     PHYSICAL EXAM:  General Appearance:	NAD, awake and alert    Psychological: Cooperative with exam  			  Head: NCAT    Eyes: Anicteric, no conjunctival injection    ENT: No rhinorrhea    Cardiovascular: RRR, 	  	  Pulmonary: Clear bilaterally  		  Thorax:	No chest wall deformities 	  		  GI/Abdomen: Soft, ND, NT	  	  Skin: No rash, no erythema		  	  Cutaneous/ Musculoskeletal: No deformities, moving all extremities. RLE interdigital space macerated and erythematous. No purulent discharge or wily bleeding. No cellulitic streaking    Vascular: PT/DP pulses were palpable in both lower extremities. Sensation intact b/l upper and lower extremities.   			  LABORATORY VALUES                        10.7   5.09  )-----------( 149      ( 01 Jul 2020 06:39 )             33.2     07-01    138  |  101  |  22  ----------------------------<  100<H>  3.7   |  26  |  1.06    Ca    8.9      01 Jul 2020 06:39

## 2020-07-01 NOTE — PROVIDER CONTACT NOTE (OTHER) - ASSESSMENT
C/O left side rib pain post eating, lasted briefly, stated he had this few times  at home as well. Passing gas

## 2020-07-01 NOTE — PROGRESS NOTE ADULT - SUBJECTIVE AND OBJECTIVE BOX
CARDIOLOGY     PROGRESS  NOTE   ________________________________________________    CHIEF COMPLAINT:Patient is a 75y old  Male who presents with a chief complaint of cellulitis (30 Jun 2020 07:57)  no complain.  	  REVIEW OF SYSTEMS:  CONSTITUTIONAL: No fever, weight loss, or fatigue  EYES: No eye pain, visual disturbances, or discharge  ENT:  No difficulty hearing, tinnitus, vertigo; No sinus or throat pain  NECK: No pain or stiffness  RESPIRATORY: No cough, wheezing, chills or hemoptysis; No Shortness of Breath  CARDIOVASCULAR: No chest pain, palpitations, passing out, dizziness, or leg swelling  GASTROINTESTINAL: No abdominal or epigastric pain. No nausea, vomiting, or hematemesis; No diarrhea or constipation. No melena or hematochezia.  GENITOURINARY: No dysuria, frequency, hematuria, or incontinence  NEUROLOGICAL: No headaches, memory loss, loss of strength, numbness, or tremors  SKIN: No itching, burning, rashes, or lesions   LYMPH Nodes: No enlarged glands  ENDOCRINE: No heat or cold intolerance; No hair loss  MUSCULOSKELETAL: No joint pain or swelling; No muscle, back, + extremity pain  PSYCHIATRIC: No depression, anxiety, mood swings, or difficulty sleeping  HEME/LYMPH: No easy bruising, or bleeding gums  ALLERGY AND IMMUNOLOGIC: No hives or eczema	    [ ] All others negative	  [ ] Unable to obtain    PHYSICAL EXAM:  T(C): 36.6 (07-01-20 @ 05:31), Max: 36.7 (06-30-20 @ 19:44)  HR: 96 (07-01-20 @ 05:31) (59 - 96)  BP: 127/70 (07-01-20 @ 05:31) (111/63 - 127/70)  RR: 18 (07-01-20 @ 05:31) (18 - 18)  SpO2: 94% (07-01-20 @ 05:31) (94% - 94%)  Wt(kg): --  I&O's Summary    29 Jun 2020 07:01  -  30 Jun 2020 07:00  --------------------------------------------------------  IN: 920 mL / OUT: 2650 mL / NET: -1730 mL    30 Jun 2020 07:01  -  01 Jul 2020 06:47  --------------------------------------------------------  IN: 1300 mL / OUT: 3100 mL / NET: -1800 mL        Appearance: Normal	  HEENT:   Normal oral mucosa, PERRL, EOMI	  Lymphatic: No lymphadenopathy  Cardiovascular: Normal S1 S2, No JVD, + murmurs, No edema  Respiratory: Lungs clear to auscultation	  Psychiatry: A & O x 3, Mood & affect appropriate  Gastrointestinal:  Soft, Non-tender, + BS	  Skin: No rashes, No ecchymoses, No cyanosis	  Neurologic: Non-focal  Extremities: Normal range of motion, No clubbing, cyanosis or edema  Vascular: + pvd , r foot redness/cellulitis improving    MEDICATIONS  (STANDING):  aspirin enteric coated 81 milliGRAM(s) Oral daily  enoxaparin Injectable 40 milliGRAM(s) SubCutaneous daily  furosemide   Injectable 40 milliGRAM(s) IV Push daily  levothyroxine 137 MICROGram(s) Oral daily  piperacillin/tazobactam IVPB.. 3.375 Gram(s) IV Intermittent every 8 hours  potassium chloride    Tablet ER 20 milliEquivalent(s) Oral daily  simvastatin 40 milliGRAM(s) Oral at bedtime      TELEMETRY: 	    ECG:  	  RADIOLOGY:  OTHER: 	  	  LABS:	 	    CARDIAC MARKERS:                                10.7   5.11  )-----------( 156      ( 29 Jun 2020 09:34 )             33.7     06-30    142  |  104  |  17  ----------------------------<  92  3.9   |  26  |  1.05    Ca    9.0      30 Jun 2020 06:30    TPro  7.1  /  Alb  3.8  /  TBili  0.4  /  DBili  x   /  AST  50<H>  /  ALT  19  /  AlkPhos  110  06-29    proBNP:   Lipid Profile:   HgA1c:   TSH: Thyroid Stimulating Hormone, Serum: 2.92 uIU/mL (06-30 @ 09:01)    PT/INR - ( 29 Jun 2020 09:34 )   PT: 11.4 sec;   INR: 1.00 ratio         PTT - ( 29 Jun 2020 09:34 )  PTT:30.4 sec  < from: Transthoracic Echocardiogram (06.30.20 @ 15:42) >  1. Mitral annular calcification, otherwise normal mitral  valve. Moderate mitral regurgitation.Eccentric anteriorly  directed jet.  2. Calcified trileaflet aortic valve with normal opening.  Peak transaortic valve gradient equals 15 mm Hg, aortic  valve velocity time integral equals 38 cm, estimated aortic  valve area equals 3.1 sqcm. Mild aortic regurgitation.  3. Severely dilated left atrium.  LA volume index = 49  cc/m2.  4. Eccentric left ventricular hypertrophy (dilated left  ventricle with normal relative wall thickness).  5. Endocardium not well visualized; grossly normal left  ventricular systolic function. Regional wall motion could  not be accurately assessed.  6. The right ventricle is not well visualized; grossly  normal right ventricular systolic function. TV s' = 21  cm/sec.  7. Estimated right ventricular systolic pressure equals 36  mm Hg, assuming right atrial pressure equals 8 mm Hg,  consistent with borderline pulmonary hypertension.    < end of copied text >      Assessment and plan  ---------------------------  75 year old male PMH PPM, CAD s/p stent on ASA, prior A-fib not on AC, hypothyroid, and total left knee replacement p/w R foot swelling and pain. Patient states this has been worsening for past 1 year. Seen in ER 6/10 and started on Augmentin x10 days without improvement. Visiting nurse has been bathing foot w/ vinegar and warm water but discharge and bleeding has been increasing.   pt with hx of sss, ?paf , s/p ppm, cad, who presented to ER with possible cellulitis with LE edema  asa daily  abx as per MEDICINE  ppm interrogated runs of wct, +intermittent a.fib short burse  ac if no contraindication  le arterial doppler/ PVD  add beta blocker  WCT needs stress test

## 2020-07-01 NOTE — CONSULT NOTE ADULT - SUBJECTIVE AND OBJECTIVE BOX
Patient is a 75y old  Male who presents with a chief complaint of cellulitis (2020 07:57)      HPI:  75 year old male PMH PPM, CAD s/p stent on ASA, prior A-fib not on AC, hypothyroid, and total left knee replacement p/w R foot swelling and pain. Patient states this has been worsening for past 1 year. Seen in ER 10 and started on Augmentin x10 days without improvement. Visiting nurse has been bathing foot w/ vinegar and warm water but discharge and bleeding has been increasing.     Spoke w/ Dr. Lainez who would like the patient admitted to Dr. Washington for right foot cellulitis. Patient also notes rash across the upper torso for past week, pruritic on R arm, and a rash in the groin. Patient denies f/c, cp, sob, abd pain, nausea, vomiting, diarrhea, constipation, or weakness.     Podiatry/Wound care- Dr. 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 pt ultimately had infection and required multiple surgeries.   he was on chronic suppression with doxy and cipro  he went with his girlfriend to her dermatologist a year ago. the dermatologist noticed a dark spot that she biopsied for cancer. the patient started leaking after the biopsy and has had problems since ( according to patient)      PAST MEDICAL & SURGICAL HISTORY:  Pacemaker  Hypothyroid  Varicose vein of leg: had surgery in   CAD (coronary artery disease)  Afib  History of total left knee replacement (TKR)  asbestos exposure      Social history:   Marital Status:  and   has a significant other  Occupation: retired sheet metal from [employer]    Lives with: self    Substance Use : denies  Tobacco Usage:  (   ) never smoked   (x   ) former smoker- quit 30 years ago   (   ) current smoker  (     ) pack year  (        ) last tobacco use date  Alcohol Usage: denies  travel; denies  Pets: denies          FAMILY HISTORY:  father  age 68- after shoveing snow  mother - deceaed at 91      REVIEW OF SYSTEMS  General:	Denies any malaise fatigue or chills. Fevers absent    Skin: rash for 2 weeks  	  Ophthalmologic:Denies any visual complaints,discharge redness or photophobia  	  ENMT:No nasal discharge,headache,sinus congestion or throat pain.No dental complaints    Respiratory and Thorax:No cough,sputum or chest pain.Denies shortness of breath  	  Cardiovascular:	No chest pain,palpitaions or dizziness    Gastrointestinal:	NO nausea,abdominal pain or diarrhea.    Genitourinary:	No dysuria,frequency. No flank pain    Musculoskeletal:	  left foot shorter thatn right- removed bone after repeated infections    Neurological:No confusion,diziness.No extremity weakness.No bladder or bowel incontinence	    Psychiatric:No delusions or hallucinations	    Hematology/Lymphatics:	No LN swelling.No gum bleeding     Endocrine:	No recent weight gain or loss.No abnormal heat/cold intolerance    Allergic/Immunologic:	No hives or rash     Allergies    afluzosin (Hives)  levofloxacin (Hives)  Myrbetriq (Hives)  tamsulosin (Hives)    Intolerances        Antimicrobials:       MEDICATIONS  (prior antimicrobials ):  piperacillin/tazobactam IVPB.   200 mL/Hr IV Intermittent (20 @ 13:02)    piperacillin/tazobactam IVPB..   25 mL/Hr IV Intermittent (20 @ 13:00)   25 mL/Hr IV Intermittent (20 @ 05:32)   25 mL/Hr IV Intermittent (20 @ 21:28)   25 mL/Hr IV Intermittent (20 @ 14:08)   25 mL/Hr IV Intermittent (20 @ 05:49)   25 mL/Hr IV Intermittent (20 @ 22:17)             piperacillin/tazobactam IVPB.. 3.375 Gram(s) IV Intermittent every 8 hours      MEDICATIONS  (STANDING):  aspirin enteric coated 81 milliGRAM(s) Oral daily  enoxaparin Injectable 40 milliGRAM(s) SubCutaneous daily  furosemide    Tablet 20 milliGRAM(s) Oral daily  levothyroxine 137 MICROGram(s) Oral daily  metoprolol succinate ER 25 milliGRAM(s) Oral daily  piperacillin/tazobactam IVPB.. 3.375 Gram(s) IV Intermittent every 8 hours  potassium chloride    Tablet ER 20 milliEquivalent(s) Oral daily  simvastatin 40 milliGRAM(s) Oral at bedtime    MEDICATIONS  (PRN):        Vital Signs Last 24 Hrs  T(C): 36.6 (2020 10:49), Max: 36.7 (2020 19:44)  T(F): 97.8 (2020 10:49), Max: 98.1 (2020 19:44)  HR: 92 (2020 10:49) (59 - 96)  BP: 116/59 (2020 10:49) (116/59 - 127/70)  BP(mean): --  RR: 18 (2020 10:49) (18 - 18)  SpO2: 94% (2020 10:49) (94% - 94%)    PHYSICAL EXAM:Pleasant patient in no acute distress.      Constitutional:Comfortable.Awake and alert  No cachexia     Eyes:PERRL eyes are dysconjugate     ENMT:No sinus tenderness.No thrush.No pharyngeal exudate or erythema.Fair dental hygiene    Neck:Supple,No LN,no JVD      Respiratory:Good air entry bilaterally,CTA    Cardiovascular:S1 S2 mumur    Gastrointestinal:Soft BS(+) no tenderness       Extremities: left leg shorter than right with swelling , strong pulse  right foot Right foot w/interdigital maceration and erythema to midfoot with minimal serous drainage    Vascular:peripheral pulses felt    Neurological:AAO X 3,No grossly focal deficits    Skin: macular papular rash on trunk and different rash under pannus and dry leathery skin of foot     Lymph Nodes:No palpable LNs    Musculoskeletal:No joint swelling or LOM    Psychiatric:Affect normal.                                10.7   5.09  )-----------( 149      ( 2020 06:39 )             33.2         07-01    138  |  101  |  22  ----------------------------<  100<H>  3.7   |  26  |  1.06    Ca    8.9      2020 06:39                RECENT CULTURES:      MICROBIOLOGY:          Radiology: Patient is a 75y old  Male who presents with a chief complaint of cellulitis (2020 07:57)      HPI:  75 year old male PMH PPM, CAD s/p stent on ASA, prior A-fib not on AC, hypothyroid, and total left knee replacement p/w R foot swelling and pain. Patient states this has been worsening for past 1 year. Seen in ER 10 and started on Augmentin x10 days without improvement. Visiting nurse has been bathing foot w/ vinegar and warm water but discharge and bleeding has been increasing.     Spoke w/ Dr. Lainez who would like the patient admitted to Dr. Washington for right foot cellulitis. Patient also notes rash across the upper torso for past week, pruritic on R arm, and a rash in the groin. Patient denies f/c, cp, sob, abd pain, nausea, vomiting, diarrhea, constipation, or weakness.     Podiatry/Wound care- Dr. 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 pt ultimately had infection and required multiple surgeries.   he was on chronic suppression with doxy and cipro  he went with his girlfriend to her dermatologist a year ago. the dermatologist noticed a dark spot that she biopsied for cancer. the patient started leaking after the biopsy and has had problems since ( according to patient)      PAST MEDICAL & SURGICAL HISTORY:  Pacemaker  Hypothyroid  Varicose vein of leg: had surgery in   CAD (coronary artery disease)  Afib  History of total left knee replacement (TKR)  asbestos exposure      Social history:   Marital Status:  and   has a significant other  Occupation: retired sheet metal from [employer]    Lives with: self    Substance Use : denies  Tobacco Usage:  (   ) never smoked   (x   ) former smoker- quit 30 years ago   (   ) current smoker  (     ) pack year  (        ) last tobacco use date  Alcohol Usage: denies  travel; denies  Pets: denies          FAMILY HISTORY:  father  age 68- after shoveing snow  mother - deceaed at 91      REVIEW OF SYSTEMS  General:	Denies any malaise fatigue or chills. Fevers absent    Skin: rash for 2 weeks  	  Ophthalmologic:Denies any visual complaints,discharge redness or photophobia  	  ENMT:No nasal discharge,headache,sinus congestion or throat pain.No dental complaints    Respiratory and Thorax:No cough,sputum or chest pain.Denies shortness of breath  	  Cardiovascular:	No chest pain,palpitaions or dizziness    Gastrointestinal:	NO nausea,abdominal pain or diarrhea.    Genitourinary:	No dysuria,frequency. No flank pain    Musculoskeletal:	  left foot shorter thatn right- removed bone after repeated infections    Neurological:No confusion,diziness.No extremity weakness.No bladder or bowel incontinence	    Psychiatric:No delusions or hallucinations	    Hematology/Lymphatics:	No LN swelling.No gum bleeding     Endocrine:	No recent weight gain or loss.No abnormal heat/cold intolerance    Allergic/Immunologic:	No hives or rash     Allergies    afluzosin (Hives)  levofloxacin (Hives)  Myrbetriq (Hives)  tamsulosin (Hives)    Intolerances        Antimicrobials:       MEDICATIONS  (prior antimicrobials ):  piperacillin/tazobactam IVPB.   200 mL/Hr IV Intermittent (20 @ 13:02)    piperacillin/tazobactam IVPB..   25 mL/Hr IV Intermittent (20 @ 13:00)   25 mL/Hr IV Intermittent (20 @ 05:32)   25 mL/Hr IV Intermittent (20 @ 21:28)   25 mL/Hr IV Intermittent (20 @ 14:08)   25 mL/Hr IV Intermittent (20 @ 05:49)   25 mL/Hr IV Intermittent (20 @ 22:17)       piperacillin/tazobactam IVPB.. 3.375 Gram(s) IV Intermittent every 8 hours      MEDICATIONS  (STANDING):  aspirin enteric coated 81 milliGRAM(s) Oral daily  enoxaparin Injectable 40 milliGRAM(s) SubCutaneous daily  furosemide    Tablet 20 milliGRAM(s) Oral daily  levothyroxine 137 MICROGram(s) Oral daily  metoprolol succinate ER 25 milliGRAM(s) Oral daily  piperacillin/tazobactam IVPB.. 3.375 Gram(s) IV Intermittent every 8 hours  potassium chloride    Tablet ER 20 milliEquivalent(s) Oral daily  simvastatin 40 milliGRAM(s) Oral at bedtime    MEDICATIONS  (PRN):        Vital Signs Last 24 Hrs  T(C): 36.6 (2020 10:49), Max: 36.7 (2020 19:44)  T(F): 97.8 (2020 10:49), Max: 98.1 (2020 19:44)  HR: 92 (2020 10:49) (59 - 96)  BP: 116/59 (2020 10:49) (116/59 - 127/70)  BP(mean): --  RR: 18 (2020 10:49) (18 - 18)  SpO2: 94% (2020 10:49) (94% - 94%)    PHYSICAL EXAM:Pleasant patient in no acute distress.      Constitutional:Comfortable.Awake and alert  No cachexia     Eyes:PERRL eyes are dysconjugate     ENMT:No sinus tenderness.No thrush.No pharyngeal exudate or erythema.Fair dental hygiene    Neck:Supple,No LN,no JVD      Respiratory:Good air entry bilaterally,CTA    Cardiovascular:S1 S2 mumur    Gastrointestinal:Soft BS(+) no tenderness       Extremities: left leg shorter than right with swelling , strong pulse  right foot Right foot w/interdigital maceration and erythema to midfoot with minimal serous drainage    Vascular:peripheral pulses felt    Neurological:AAO X 3,No grossly focal deficits    Skin: macular papular rash on trunk and different rash under pannus and dry leathery skin of foot     Lymph Nodes:No palpable LNs    Musculoskeletal:No joint swelling or LOM    Psychiatric:Affect normal.                                10.7   5.09  )-----------( 149      ( 2020 06:39 )             33.2         07-    138  |  101  |  22  ----------------------------<  100<H>  3.7   |  26  |  1.06    Ca    8.9      2020 06:39        COVID-19 PCR . (20 @ 10:58)    COVID-19 PCR: NotDetec: This test has been validated by [facility] to be accurate;    Culture - Abscess with Gram Stain (18 @ 00:35)    -  Amikacin: S <=8    -  Amoxicillin/Clavulanic Acid: R >16/8    -  Ampicillin: R >16 These ampicillin results predict results for amoxicillin    -  Ampicillin/Sulbactam: I 16/8    -  Aztreonam: S <=4    -  Cefazolin: R >16    -  Cefepime: S <=2    -  Cefoxitin: S 8    -  Ceftriaxone: S <=1 Enterobacter, Citrobacter, and Serratia may develop resistance during prolonged therapy    -  Ciprofloxacin: S <=0.5    -  Ertapenem: S <=0.5    -  Gentamicin: S <=1    -  Imipenem: S <=1    -  Levofloxacin: S <=1    -  Meropenem: S <=1    -  Piperacillin/Tazobactam: S <=8    -  Tobramycin: S <=2    -  Trimethoprim/Sulfamethoxazole: S <=0.5/9.5    Specimen Source: .Abscess Leg - Left    Culture Results:   Few Morganella morganii    Organism Identification: Morganella morganii    Organism: Morganella morganii    Method Type: LEWIS      	Previous Result Next Result					  	Order Start Date & Time 	2020 12:30	Result Date & Time 	2020 15:51		  						  						  						  						  						    	  	Enterococcus faecalis	Pseudomonas aeruginosa	Staphylococcus aureus	  	Minimum Inhibitory Concentration	Minimum Inhibitory Concentration	Minimum Inhibitory Concentration	  Amikacin		S (<=16)		  Ampicillin	S (<=2)			  Ampicillin/Sulbactam			S (<=8/4)	  Aztreonam		S (<=4)		  Cefazolin			S (<=4)	  Cefepime		S (<=2)		  Ceftazidime		S (<=1)		  Ciprofloxacin		S (<=0.25)		  Clindamycin			R (>4)	  Erythromycin			R (>4)	  Gentamicin		S (<=2)	R (>8)	  Imipenem		S (<=1)		  Levofloxacin		S (<=0.5)		  Meropenem		S (<=1)		  Oxacillin			S (<=0.25)	  Piperacillin/Tazobactam		S (<=8)		  Rifampin			S (<=1)	  Tetra/Doxy	R (>8)		R (>8)	  Tobramycin		S (<=2)		  Trimethoprim/Sulfamethoxazole			S (<=0.5/9.5)	  Vancomycin	S (2)		S (2)	    < from: [facility] Physiol Extremity Lower 3+ Level, BI (20 @ 14:17) >  Impression:     There is a blood pressure discrepancy of 15 mmHg between the right and left upper arms.    No lower extremity arterial vascular disease is identified.    < end of copied text >        < from: [facility] Duplex Lower Ext Vein Scan, Bilat (20 @ 15:17) >  IMPRESSION:   No evidence of deep venous thrombosis in either lower extremity.    < end of copied text >      < from: Xray Foot AP + Lateral + Oblique, Right (20 @ 11:05) >      IMPRESSION:  No radiographic evidence of advanced osteomyelitis.    < end of copied text >        < from: Transthoracic Echocardiogram (20 @ 15:42) >  Conclusions:  1. Mitral annular calcification, otherwise normal mitral  valve. Moderate mitral regurgitation.Eccentric anteriorly  directed jet.  2. Calcified trileaflet aortic valve with normal opening.  Peak transaortic valve gradient equals 15 mm Hg, aortic  valve velocity time integral equals 38 cm, estimated aortic  valve area equals 3.1 sqcm. Mild aortic regurgitation.  3. Severely dilated left atrium.  LA volume index = 49  cc/m2.  4. Eccentric left ventricular hypertrophy (dilated left  ventricle with normal relative wall thickness).  5. Endocardium not well visualized; grossly normal left  ventricular systolic function. Regional wall motion could  not be accurately assessed.  6. The right ventricle is not well visualized; grossly  normal right ventricular systolic function. TV s' = 21  cm/sec.  7. Estimated right ventricular systolic pressure equals 36  mm Hg, assuming right atrial pressure equals 8 mm Hg,  consistent with borderline pulmonary hypertension.  *** No previous Echo exam.    < end of copied text >

## 2020-07-01 NOTE — CHART NOTE - NSCHARTNOTEFT_GEN_A_CORE
Surgery Preop Note    Patient is a 75y old  Male who presents with a chief complaint of right foot infection (01 Jul 2020 16:07)    Diagnosis: small vessel arterial disease  Procedure: RLE angiogram, possible angioplasty, possible stent  Surgeon: Dr. Sharma                          10.7   5.09  )-----------( 149      ( 01 Jul 2020 06:39 )             33.2     07-01    138  |  101  |  22  ----------------------------<  100<H>  3.7   |  26  |  1.06    Ca    8.9      01 Jul 2020 06:39      Chest X RAY - Performed 7/1/20, results pending    EKG  Ventricular Rate 60 BPM    Atrial Rate 60 BPM    P-R Interval 276 ms    QRS Duration 94 ms    Q-T Interval 452 ms    QTC Calculation(Bezet) 452 ms    P Axis 17 degrees    R Axis 18 degrees    T Axis 37 degrees    Diagnosis Line Atrial-paced rhythm with prolonged AV conduction  ABNORMAL ECG  WHEN COMPARED WITH ECG OF 04-DEC-2018 22:01,  SIGNIFICANT CHANGES HAVE OCCURRED  Confirmed by PRITI HWANG MD ([id number]) on 6/30/2020 5:41:25 PM       MEDICATIONS  (STANDING):  aspirin enteric coated 81 milliGRAM(s) Oral daily  enoxaparin Injectable 40 milliGRAM(s) SubCutaneous daily  furosemide    Tablet 20 milliGRAM(s) Oral daily  levothyroxine 137 MICROGram(s) Oral daily  metoprolol succinate ER 25 milliGRAM(s) Oral daily  nystatin Powder 1 Application(s) Topical three times a day  piperacillin/tazobactam IVPB.. 3.375 Gram(s) IV Intermittent every 8 hours  potassium chloride    Tablet ER 20 milliEquivalent(s) Oral daily  simvastatin 40 milliGRAM(s) Oral at bedtime  sodium chloride 0.9%. 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 Concern for combination of venous insuff and arterial insuff with small vessel arterial disease.    NPO after midnight, IVF  Pain Control  DVT prophylaxis  Patient will need cardiac clearance given h/o of AFIB and pacemaker with wide-complex tachycardia on recent interrogation. Discussed need for cardiac clearance before OR with primary team.  F/u CXR results    Jordy, PGY1

## 2020-07-01 NOTE — PROGRESS NOTE ADULT - SUBJECTIVE AND OBJECTIVE BOX
Patient is a 75y old  Male who presents with a chief complaint of cellulitis (30 Jun 2020 07:57)       INTERVAL HPI/OVERNIGHT EVENTS:  Patient seen and evaluated at bedside.  Pt is resting comfortable in NAD. Denies N/V/F/C.      Allergies    afluzosin (Hives)  levofloxacin (Hives)  Myrbetriq (Hives)  tamsulosin (Hives)    Intolerances        Vital Signs Last 24 Hrs  T(C): 36.6 (01 Jul 2020 05:31), Max: 36.7 (30 Jun 2020 19:44)  T(F): 97.9 (01 Jul 2020 05:31), Max: 98.1 (30 Jun 2020 19:44)  HR: 96 (01 Jul 2020 05:31) (59 - 96)  BP: 127/70 (01 Jul 2020 05:31) (111/63 - 127/70)  BP(mean): --  RR: 18 (01 Jul 2020 05:31) (18 - 18)  SpO2: 94% (01 Jul 2020 05:31) (94% - 94%)    LABS:                        10.7   5.09  )-----------( 149      ( 01 Jul 2020 06:39 )             33.2     07-01    138  |  101  |  22  ----------------------------<  100<H>  3.7   |  26  |  1.06    Ca    8.9      01 Jul 2020 06:39          CAPILLARY BLOOD GLUCOSE          Lower Extremity Physical Exam:  Vascular: DP/PT 2/4, B/L, CFT <3 seconds B/L, Temperature gradient warm to warmB/L, + 2 pitting edema b/l LE  Neuro: Epicritic sensation intact to the level of digits B/L.  Musculoskeletal/Ortho: no gross deformities   Skin: venous stasis dermatitis b/l LE, Right foot w/ improving interdigital maceration and erythema to midfoot with minimal serous drainage.  No signs of open lesions or wounds, no purulence noted, no streaking cellulitis. No signs of ischemic skin changes.  Right lower extremity and ankle pruritus.    RADIOLOGY & ADDITIONAL TESTS:    < from: Xray Foot AP + Lateral + Oblique, Right (06.29.20 @ 11:05) >    EXAM:  FOOT COMPLETE RIGHT (MIN 3 VIEW)                            PROCEDURE DATE:  06/29/2020            INTERPRETATION:      CLINICAL INDICATION: Right foot pain and swelling. Question osteomyelitis.  TECHNIQUE: AP, oblique, and lateral radiographs of the right foot.    COMPARISON: Right foot radiographs 10 Pam 2020.    FINDINGS:    No acute fracture or dislocation. No osseous erosion or periosteal new bone formation. Degenerative changes of the midfoot including the talonavicular and calcaneonavicular joints. Cartilage spaces are otherwise maintained. Normal alignment of the tarsometatarsal joints. Soft tissue swelling over the dorsum of the foot. No tracking subcutaneous emphysema. No radiopaque foreign body.        IMPRESSION:  No radiographic evidence of advanced osteomyelitis.                    RD RED M.D., ATTENDING RADIOLOGIST    < end of copied text >

## 2020-07-01 NOTE — PROGRESS NOTE ADULT - ASSESSMENT
74 yo M w/ RF improving interdigital maceration   - pt seen and evaulated   - Neurovascular status of right foot intact with palpable pulses  - Right foot tinea pedis with interdigital maceration but no open wounds or streaking cellulitis with improving maceration and swelling.   - Office wound culture 6/4 growing PsA, MSSA, E Faecalis   - Extensive discussion with patient confirming no limb threatening condition of his right lower extremity and focused on fluid retention and importance of local wound care. Patient understanding  - Podiatry to sign off at this time & follow primary team mgmt. Please reconsult as needed  - Discharge info in paperwork  - Seen with attending Dr. Fonseca

## 2020-07-01 NOTE — CONSULT NOTE ADULT - ASSESSMENT
75 year old male PMH PPM, CAD s/p stent on ASA, prior A-fib not on AC, hypothyroid, and total left knee replacement p/w R foot swelling and pain. Patient states this has been worsening for past 1 year. Seen in ER 6/10 and started on Augmentin x10 days without improvement. Visiting nurse has been bathing foot w/ vinegar and warm water but discharge and bleeding has been increasing.     Spoke w/ Dr. Lainez who would like the patient admitted to Dr. Washington for right foot cellulitis. Patient also notes rash across the upper torso for past week, pruritic on R arm, and a rash in the groin. Patient denies f/c, cp, sob, abd pain, nausea, vomiting, diarrhea, constipation, or weakness.     Podiatry/Wound care- Dr. 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 pt ultimately had infection and required multiple surgeries.   he was on chronic suppression with doxy and cipro  he went with his girlfriend to her dermatologist a year ago. the dermatologist noticed a dark spot that she biopsied for cancer. the patient started leaking after the biopsy and has had problems since ( according to patient)    # rash  I am concerned that rash on trunk is from the penicllin. Pt with different rash under maria r that seems fungal  pts foot is extremely dry. suggest derm input Pt with 7 % eosinophils    # cellulitis  not a clear cellulitis  Toes are discolored and dry and wrinkled  There is a scaley area near the ankle , medial and on shin. suggest dermatology input for this as well  cultures noted. Zosyn would address all the bacteria isolated but i am worried it is causing a rash  check blood culture    #vascular   pt with pulses bilaterally  vascular is following  ? some problems from venous insufficiency vs arterial insufficiency for dermatological issues or a combination thereof

## 2020-07-01 NOTE — PROGRESS NOTE ADULT - SUBJECTIVE AND OBJECTIVE BOX
afebrile    REVIEW OF SYSTEMS:  GEN: no fever,    no chills  RESP: no SOB,   no cough  CVS: no chest pain,   no palpitations  GI: no abdominal pain,   no nausea,   no vomiting,   no constipation,   no diarrhea  : no dysuria,   no frequency  NEURO: no headache,   no dizziness  PSYCH: no depression,   not anxious  Derm : no rash    MEDICATIONS  (STANDING):  aspirin enteric coated 81 milliGRAM(s) Oral daily  enoxaparin Injectable 40 milliGRAM(s) SubCutaneous daily  furosemide    Tablet 20 milliGRAM(s) Oral daily  levothyroxine 137 MICROGram(s) Oral daily  metoprolol succinate ER 25 milliGRAM(s) Oral daily  piperacillin/tazobactam IVPB.. 3.375 Gram(s) IV Intermittent every 8 hours  potassium chloride    Tablet ER 20 milliEquivalent(s) Oral daily  simvastatin 40 milliGRAM(s) Oral at bedtime    MEDICATIONS  (PRN):      Vital Signs Last 24 Hrs  T(C): 36.6 (01 Jul 2020 05:31), Max: 36.7 (30 Jun 2020 19:44)  T(F): 97.9 (01 Jul 2020 05:31), Max: 98.1 (30 Jun 2020 19:44)  HR: 96 (01 Jul 2020 05:31) (59 - 96)  BP: 127/70 (01 Jul 2020 05:31) (111/63 - 127/70)  BP(mean): --  RR: 18 (01 Jul 2020 05:31) (18 - 18)  SpO2: 94% (01 Jul 2020 05:31) (94% - 94%)  CAPILLARY BLOOD GLUCOSE        I&O's Summary    30 Jun 2020 07:01  -  01 Jul 2020 07:00  --------------------------------------------------------  IN: 1300 mL / OUT: 3100 mL / NET: -1800 mL        PHYSICAL EXAM:  HEAD:  Atraumatic, Normocephalic  NECK: Supple, No   JVD  CHEST/LUNG:   no     rales,     no,    rhonchi  HEART: Regular rate and rhythm;         murmur  ABDOMEN: Soft, Nontender, ;   EXTREMITIES:   less redness/      edema. distal  leg  NEUROLOGY:  alert    LABS:                        10.7   5.09  )-----------( 149      ( 01 Jul 2020 06:39 )             33.2     06-30    142  |  104  |  17  ----------------------------<  92  3.9   |  26  |  1.05    Ca    9.0      30 Jun 2020 06:30    TPro  7.1  /  Alb  3.8  /  TBili  0.4  /  DBili  x   /  AST  50<H>  /  ALT  19  /  AlkPhos  110  06-29    PT/INR - ( 29 Jun 2020 09:34 )   PT: 11.4 sec;   INR: 1.00 ratio         PTT - ( 29 Jun 2020 09:34 )  PTT:30.4 sec                Thyroid Stimulating Hormone, Serum: 2.92 uIU/mL (06-30 @ 09:01)          Consultant(s) Notes Reviewed:      Care Discussed with Consultants/Other Providers:

## 2020-07-01 NOTE — CONSULT NOTE ADULT - PROBLEM SELECTOR RECOMMENDATION 4
I Christopher Sharma MD performed a history and physical exam of the patient and discussed  the findings and plan with the house officer. I reviewed the resident note and agree with the findings and plan

## 2020-07-01 NOTE — CONSULT NOTE ADULT - ATTENDING COMMENTS
Dora Chaudhry M.D. ,   Pager [phone]     after 5PM/ weekends [phone]
JERICHO Sharma MD performed a history and physical exam of the patient and discussed  the findings and plan with the house officer. I reviewed the resident note and agree with the findings and plan   I Christopher Sharma MD have personally seen and examined the patient at bedside today at 2  pm

## 2020-07-01 NOTE — CONSULT NOTE ADULT - ASSESSMENT
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 Concern for cambo of venous insuff and arterial insuff w small vessel arterial disease      Plan:  continue woundcare   I Christopher Sharma MD explained the indications risks and benefits of   right leg angiogram, possible endovascular intervention to patient who understands and consents   pt is tent grzegorz for rle angio for  thurs  will follow

## 2020-07-01 NOTE — PROGRESS NOTE ADULT - SUBJECTIVE AND OBJECTIVE BOX
INTERVAL HPI/OVERNIGHT EVENTS:    patient seen and examined  tolerating PO without nausea, vomiting, abdominal pain  hgb stable   no bms overnight     MEDICATIONS  (STANDING):  aspirin enteric coated 81 milliGRAM(s) Oral daily  enoxaparin Injectable 40 milliGRAM(s) SubCutaneous daily  furosemide    Tablet 20 milliGRAM(s) Oral daily  levothyroxine 137 MICROGram(s) Oral daily  metoprolol succinate ER 25 milliGRAM(s) Oral daily  piperacillin/tazobactam IVPB.. 3.375 Gram(s) IV Intermittent every 8 hours  potassium chloride    Tablet ER 20 milliEquivalent(s) Oral daily  simvastatin 40 milliGRAM(s) Oral at bedtime    MEDICATIONS  (PRN):      Allergies    afluzosin (Hives)  levofloxacin (Hives)  Myrbetriq (Hives)  tamsulosin (Hives)    Intolerances        Review of Systems:    General:  No wt loss, fevers, chills, night sweats,fatigue,   Eyes:  Good vision, no reported pain  ENT:  No sore throat, pain, runny nose, dysphagia  CV:  No pain, palpitatioins, hypo/hypertension  Resp:  No dyspnea, cough, tachypnea, wheezing  GI:  No pain, No nausea, No vomiting, No diarrhea, No constipatiion, No weight loss, No fever, No pruritis, No rectal bleeding, No tarry stools, No dysphagia,  :  No pain, bleeding, incontinence, nocturia  Muscle:  No pain, weakness  Neuro:  No weakness, tingling, memory problems  Psych:  No fatigue, insomnia, mood problems, depression  Endocrine:  No polyuria, polydypsia, cold/heat intolerance  Heme:  No petechiae, ecchymosis, easy bruisability  Skin:  No rash, tattoos, scars, edema      Vital Signs Last 24 Hrs  T(C): 36.6 (01 Jul 2020 05:31), Max: 36.7 (30 Jun 2020 19:44)  T(F): 97.9 (01 Jul 2020 05:31), Max: 98.1 (30 Jun 2020 19:44)  HR: 96 (01 Jul 2020 05:31) (59 - 96)  BP: 127/70 (01 Jul 2020 05:31) (111/63 - 127/70)  BP(mean): --  RR: 18 (01 Jul 2020 05:31) (18 - 18)  SpO2: 94% (01 Jul 2020 05:31) (94% - 94%)    PHYSICAL EXAM:    Constitutional: NAD, well-developed  HEENT: EOMI, throat clear  Neck: No LAD, supple  Respiratory: CTA and P  Cardiovascular: S1 and S2, RRR, no M  Gastrointestinal: BS+, soft, NT/ND, neg HSM,  Extremities: No peripheral edema, neg clubing, cyanosis  Vascular: 2+ peripheral pulses  Neurological: A/O x 3, no focal deficits  Psychiatric: Normal mood, normal affect  Skin: No rashes      LABS:                        10.7   5.09  )-----------( 149      ( 01 Jul 2020 06:39 )             33.2     07-01    138  |  101  |  22  ----------------------------<  100<H>  3.7   |  26  |  1.06    Ca    8.9      01 Jul 2020 06:39            RADIOLOGY & ADDITIONAL TESTS:

## 2020-07-01 NOTE — PROGRESS NOTE ADULT - ASSESSMENT
75 year old male         PMH PPM, CAD s/p stent on ASA, A-fib, hypothyroid, and total left knee replacement      p/w R foot swelling and pain.     Patient states this has been worsening for past 1 year.  on  augmentin,  with  no  improvement   Patient also notes rash across the upper torso for past week, pruritic on R arm, and a rash in the groin         right foot cellulits affecting toes., on   IV Zosyn.     Podiatry will follow.   , foot x-rays negative for OM.     IVP Lasix ,   for bilateral leg edema.    LE venous dopplers, no  dvt      ASA   prior stent in 2007./   AFIB, PPM   h/o  AFIB,    no  a/c//  not  sure  why ?/    card  eval  Anemia,  gi  eval dr joann sanchez  will interrogate  PPM/ if  pt  has  afib, as stored   event, , then  will need  to start  a/c/  pt  made  aware   of  risks  out  pt  card is  dr mikayla dick  vascular  eval/  has  small  area  of  dry gangrene, toe   awaiting vascular eval   echo,  normal  Ef 75 year old male         PMH PPM, CAD s/p stent on ASA, A-fib, hypothyroid, and total left knee replacement      p/w R foot swelling and pain.     Patient states this has been worsening for past 1 year.  on  augmentin,  with  no  improvement   Patient also notes rash across the upper torso for past week, pruritic on R arm, and a rash in the groin         right foot cellulits affecting toes., on   IV Zosyn.     Podiatry will follow.   , foot x-rays negative for OM.     IVP Lasix ,   for bilateral leg edema.    LE venous dopplers, no  dvt      ASA   prior stent in 2007./   AFIB, PPM   h/o  AFIB,    no  a/c//  not  sure  why ?/    card  eval  Anemia,  gi  eval dr joann sanchez  will interrogate  PPM/ if  pt  has  afib, as stored   event, , then  will need  to start  a/c/  pt  made  aware   of  risks  out  pt  card is  dr mikayla dick  vascular  eval/  has  small  area  of  dry gangrene, toe   awaiting vascular eval   echo,  normal  Ef  PPM  interrogation  with  WCT/  stress test/  bursts  of  afib/  start  a/c,  after  angiogram     addendum/  9  pm   per  vascular,  for  angiogram  on 7/ 2   cleared  for  angio  in  am

## 2020-07-01 NOTE — CONSULT NOTE ADULT - EXTREMITIES COMMENTS
mild art insuff w mod trophic skin changes   rt foot web space 1234 wounds w cellulitis  mod  venous insuff w mild to  mod edema and mild to mod st dermatitis

## 2020-07-02 ENCOUNTER — APPOINTMENT (OUTPATIENT)
Dept: WOUND CARE | Facility: CLINIC | Age: 75
End: 2020-07-02

## 2020-07-02 LAB
ANION GAP SERPL CALC-SCNC: 9 MMOL/L — SIGNIFICANT CHANGE UP (ref 5–17)
APTT BLD: 31.2 SEC — SIGNIFICANT CHANGE UP (ref 27.5–35.5)
BASOPHILS # BLD AUTO: 0.05 K/UL — SIGNIFICANT CHANGE UP (ref 0–0.2)
BASOPHILS NFR BLD AUTO: 0.9 % — SIGNIFICANT CHANGE UP (ref 0–2)
BLD GP AB SCN SERPL QL: NEGATIVE — SIGNIFICANT CHANGE UP
BUN SERPL-MCNC: 24 MG/DL — HIGH (ref 7–23)
CALCIUM SERPL-MCNC: 8.9 MG/DL — SIGNIFICANT CHANGE UP (ref 8.4–10.5)
CHLORIDE SERPL-SCNC: 106 MMOL/L — SIGNIFICANT CHANGE UP (ref 96–108)
CO2 SERPL-SCNC: 27 MMOL/L — SIGNIFICANT CHANGE UP (ref 22–31)
CREAT SERPL-MCNC: 1.07 MG/DL — SIGNIFICANT CHANGE UP (ref 0.5–1.3)
EOSINOPHIL # BLD AUTO: 0.41 K/UL — SIGNIFICANT CHANGE UP (ref 0–0.5)
EOSINOPHIL NFR BLD AUTO: 7.7 % — HIGH (ref 0–6)
GLUCOSE SERPL-MCNC: 114 MG/DL — HIGH (ref 70–99)
HCT VFR BLD CALC: 33.1 % — LOW (ref 39–50)
HGB BLD-MCNC: 10.7 G/DL — LOW (ref 13–17)
IMM GRANULOCYTES NFR BLD AUTO: 0.2 % — SIGNIFICANT CHANGE UP (ref 0–1.5)
INR BLD: 1.11 RATIO — SIGNIFICANT CHANGE UP (ref 0.88–1.16)
LYMPHOCYTES # BLD AUTO: 1.3 K/UL — SIGNIFICANT CHANGE UP (ref 1–3.3)
LYMPHOCYTES # BLD AUTO: 24.5 % — SIGNIFICANT CHANGE UP (ref 13–44)
MAGNESIUM SERPL-MCNC: 2.2 MG/DL — SIGNIFICANT CHANGE UP (ref 1.6–2.6)
MCHC RBC-ENTMCNC: 30.2 PG — SIGNIFICANT CHANGE UP (ref 27–34)
MCHC RBC-ENTMCNC: 32.3 GM/DL — SIGNIFICANT CHANGE UP (ref 32–36)
MCV RBC AUTO: 93.5 FL — SIGNIFICANT CHANGE UP (ref 80–100)
MONOCYTES # BLD AUTO: 0.59 K/UL — SIGNIFICANT CHANGE UP (ref 0–0.9)
MONOCYTES NFR BLD AUTO: 11.1 % — SIGNIFICANT CHANGE UP (ref 2–14)
NEUTROPHILS # BLD AUTO: 2.95 K/UL — SIGNIFICANT CHANGE UP (ref 1.8–7.4)
NEUTROPHILS NFR BLD AUTO: 55.6 % — SIGNIFICANT CHANGE UP (ref 43–77)
NRBC # BLD: 0 /100 WBCS — SIGNIFICANT CHANGE UP (ref 0–0)
PHOSPHATE SERPL-MCNC: 3.1 MG/DL — SIGNIFICANT CHANGE UP (ref 2.5–4.5)
PLATELET # BLD AUTO: 156 K/UL — SIGNIFICANT CHANGE UP (ref 150–400)
POTASSIUM SERPL-MCNC: 4.1 MMOL/L — SIGNIFICANT CHANGE UP (ref 3.5–5.3)
POTASSIUM SERPL-SCNC: 4.1 MMOL/L — SIGNIFICANT CHANGE UP (ref 3.5–5.3)
PROTHROM AB SERPL-ACNC: 12.7 SEC — SIGNIFICANT CHANGE UP (ref 10.6–13.6)
RBC # BLD: 3.54 M/UL — LOW (ref 4.2–5.8)
RBC # FLD: 14.6 % — HIGH (ref 10.3–14.5)
RH IG SCN BLD-IMP: NEGATIVE — SIGNIFICANT CHANGE UP
SODIUM SERPL-SCNC: 142 MMOL/L — SIGNIFICANT CHANGE UP (ref 135–145)
WBC # BLD: 5.39 K/UL — SIGNIFICANT CHANGE UP (ref 3.8–10.5)
WBC # FLD AUTO: 5.39 K/UL — SIGNIFICANT CHANGE UP (ref 3.8–10.5)

## 2020-07-02 PROCEDURE — 99232 SBSQ HOSP IP/OBS MODERATE 35: CPT

## 2020-07-02 PROCEDURE — 99223 1ST HOSP IP/OBS HIGH 75: CPT

## 2020-07-02 PROCEDURE — 99233 SBSQ HOSP IP/OBS HIGH 50: CPT

## 2020-07-02 RX ORDER — SODIUM CHLORIDE 9 MG/ML
1000 INJECTION INTRAMUSCULAR; INTRAVENOUS; SUBCUTANEOUS
Refills: 0 | Status: DISCONTINUED | OUTPATIENT
Start: 2020-07-02 | End: 2020-07-03

## 2020-07-02 RX ORDER — METOPROLOL TARTRATE 50 MG
50 TABLET ORAL DAILY
Refills: 0 | Status: DISCONTINUED | OUTPATIENT
Start: 2020-07-02 | End: 2020-07-06

## 2020-07-02 RX ORDER — CHLORHEXIDINE GLUCONATE 213 G/1000ML
1 SOLUTION TOPICAL ONCE
Refills: 0 | Status: DISCONTINUED | OUTPATIENT
Start: 2020-07-02 | End: 2020-07-03

## 2020-07-02 RX ADMIN — PIPERACILLIN AND TAZOBACTAM 25 GRAM(S): 4; .5 INJECTION, POWDER, LYOPHILIZED, FOR SOLUTION INTRAVENOUS at 15:15

## 2020-07-02 RX ADMIN — PIPERACILLIN AND TAZOBACTAM 25 GRAM(S): 4; .5 INJECTION, POWDER, LYOPHILIZED, FOR SOLUTION INTRAVENOUS at 21:39

## 2020-07-02 RX ADMIN — Medication 137 MICROGRAM(S): at 05:34

## 2020-07-02 RX ADMIN — SODIUM CHLORIDE 100 MILLILITER(S): 9 INJECTION INTRAMUSCULAR; INTRAVENOUS; SUBCUTANEOUS at 00:17

## 2020-07-02 RX ADMIN — PIPERACILLIN AND TAZOBACTAM 25 GRAM(S): 4; .5 INJECTION, POWDER, LYOPHILIZED, FOR SOLUTION INTRAVENOUS at 05:34

## 2020-07-02 RX ADMIN — NYSTATIN CREAM 1 APPLICATION(S): 100000 CREAM TOPICAL at 05:35

## 2020-07-02 RX ADMIN — Medication 20 MILLIEQUIVALENT(S): at 12:43

## 2020-07-02 RX ADMIN — NYSTATIN CREAM 1 APPLICATION(S): 100000 CREAM TOPICAL at 21:39

## 2020-07-02 RX ADMIN — ENOXAPARIN SODIUM 40 MILLIGRAM(S): 100 INJECTION SUBCUTANEOUS at 12:43

## 2020-07-02 RX ADMIN — SIMVASTATIN 40 MILLIGRAM(S): 20 TABLET, FILM COATED ORAL at 21:39

## 2020-07-02 RX ADMIN — SODIUM CHLORIDE 50 MILLILITER(S): 9 INJECTION INTRAMUSCULAR; INTRAVENOUS; SUBCUTANEOUS at 18:31

## 2020-07-02 RX ADMIN — NYSTATIN CREAM 1 APPLICATION(S): 100000 CREAM TOPICAL at 15:15

## 2020-07-02 RX ADMIN — Medication 81 MILLIGRAM(S): at 12:43

## 2020-07-02 RX ADMIN — Medication 1 APPLICATION(S): at 18:30

## 2020-07-02 RX ADMIN — Medication 20 MILLIGRAM(S): at 05:35

## 2020-07-02 RX ADMIN — Medication 25 MILLIGRAM(S): at 05:35

## 2020-07-02 NOTE — PROGRESS NOTE ADULT - SUBJECTIVE AND OBJECTIVE BOX
VASCULAR SURGERY PROGRESS NOTE    75yMale    SUBJECTIVE:  Patient seen and examined at bedside. Mild discomfort of right lower extremity. No sensory or motor changes.     --------------------------------------------------------------------------------------------------  OBJECTIVE:     Vital Signs:  Vital Signs Last 24 Hrs  T(C): 37 (02 Jul 2020 04:44), Max: 37 (02 Jul 2020 04:44)  T(F): 98.6 (02 Jul 2020 04:44), Max: 98.6 (02 Jul 2020 04:44)  HR: 88 (02 Jul 2020 04:44) (68 - 92)  BP: 156/69 (02 Jul 2020 04:44) (116/59 - 156/69)  BP(mean): --  RR: 17 (02 Jul 2020 04:44) (17 - 18)  SpO2: 95% (02 Jul 2020 04:44) (94% - 96%)    --------------------------------------------------------------------------------------------------  Inputs/Outputs:    01 Jul 2020 07:01  -  02 Jul 2020 07:00  --------------------------------------------------------  IN:    IV PiggyBack: 100 mL    Oral Fluid: 1160 mL    sodium chloride 0.9%: 600 mL  Total IN: 1860 mL    OUT:    Voided: 1840 mL  Total OUT: 1840 mL    Total NET: 20 mL    --------------------------------------------------------------------------------------------------  Laboratories:                        10.7   5.09  )-----------( 149      ( 01 Jul 2020 06:39 )             33.2         01 Jul 2020 06:39    138    |  101    |  22     ----------------------------<  100    3.7     |  26     |  1.06     Ca    8.9        01 Jul 2020 06:39    --------------------------------------------------------------------------------------------------  Physical Exam:  General: AAOx3, NAD, resting comfortably   HEENT: NC/AT  Respiratory: no increased work of breathing   Abdomen: soft, nontender, nondistended  Extremities: warm and well perfused  PT/DP pulses were palpable in both lower extremities. Sensation intact b/l upper and lower extremities. Right interdigit infection  --------------------------------------------------------------------------------------------------  Medications:  MEDICATIONS  (STANDING):  aspirin enteric coated 81 milliGRAM(s) Oral daily  chlorhexidine 2% Cloths 1 Application(s) Topical once  enoxaparin Injectable 40 milliGRAM(s) SubCutaneous daily  furosemide    Tablet 20 milliGRAM(s) Oral daily  levothyroxine 137 MICROGram(s) Oral daily  metoprolol succinate ER 50 milliGRAM(s) Oral daily  nystatin Powder 1 Application(s) Topical three times a day  piperacillin/tazobactam IVPB.. 3.375 Gram(s) IV Intermittent every 8 hours  potassium chloride    Tablet ER 20 milliEquivalent(s) Oral daily  simvastatin 40 milliGRAM(s) Oral at bedtime  sodium chloride 0.9%. 1000 milliLiter(s) (50 mL/Hr) IV Continuous <Continuous>    MEDICATIONS  (PRN):  hydrOXYzine hydrochloride 25 milliGRAM(s) Oral two times a day PRN Itching VASCULAR SURGERY PROGRESS NOTE    75yMale    SUBJECTIVE:  Patient seen and examined at bedside. Mild discomfort of right lower extremity. No sensory or motor changes.     --------------------------------------------------------------------------------------------------  OBJECTIVE:     Vital Signs:  Vital Signs Last 24 Hrs  T(C): 37 (02 Jul 2020 04:44), Max: 37 (02 Jul 2020 04:44)  T(F): 98.6 (02 Jul 2020 04:44), Max: 98.6 (02 Jul 2020 04:44)  HR: 88 (02 Jul 2020 04:44) (68 - 92)  BP: 156/69 (02 Jul 2020 04:44) (116/59 - 156/69)  BP(mean): --  RR: 17 (02 Jul 2020 04:44) (17 - 18)  SpO2: 95% (02 Jul 2020 04:44) (94% - 96%)    --------------------------------------------------------------------------------------------------  Inputs/Outputs:    01 Jul 2020 07:01  -  02 Jul 2020 07:00  --------------------------------------------------------  IN:    IV PiggyBack: 100 mL    Oral Fluid: 1160 mL    sodium chloride 0.9%: 600 mL  Total IN: 1860 mL    OUT:    Voided: 1840 mL  Total OUT: 1840 mL    Total NET: 20 mL    --------------------------------------------------------------------------------------------------  Laboratories:                        10.7   5.09  )-----------( 149      ( 01 Jul 2020 06:39 )             33.2         01 Jul 2020 06:39    138    |  101    |  22     ----------------------------<  100    3.7     |  26     |  1.06     Ca    8.9        01 Jul 2020 06:39    --------------------------------------------------------------------------------------------------  Physical Exam:  General: AAOx3, NAD, resting comfortably   HEENT: NC/AT  Respiratory: no increased work of breathing   Abdomen: soft, nontender, nondistended  Extremities: warm and well perfused  PT/DP pulses were palpable in both lower extremities. Sensation intact b/l upper and lower extremities. Right interdigit infection  limited  web space wound healing remains  --------------------------------------------------------------------------------------------------  Medications:  MEDICATIONS  (STANDING):  aspirin enteric coated 81 milliGRAM(s) Oral daily  chlorhexidine 2% Cloths 1 Application(s) Topical once  enoxaparin Injectable 40 milliGRAM(s) SubCutaneous daily  furosemide    Tablet 20 milliGRAM(s) Oral daily  levothyroxine 137 MICROGram(s) Oral daily  metoprolol succinate ER 50 milliGRAM(s) Oral daily  nystatin Powder 1 Application(s) Topical three times a day  piperacillin/tazobactam IVPB.. 3.375 Gram(s) IV Intermittent every 8 hours  potassium chloride    Tablet ER 20 milliEquivalent(s) Oral daily  simvastatin 40 milliGRAM(s) Oral at bedtime  sodium chloride 0.9%. 1000 milliLiter(s) (50 mL/Hr) IV Continuous <Continuous>    MEDICATIONS  (PRN):  hydrOXYzine hydrochloride 25 milliGRAM(s) Oral two times a day PRN Itching

## 2020-07-02 NOTE — PROGRESS NOTE ADULT - SUBJECTIVE AND OBJECTIVE BOX
Patient is a 75y old  Male who presents with a chief complaint of right foot infection (01 Jul 2020 16:07)    Being followed by ID for        Interval history:  pt seen with the dermatology resident  pts girlfriend was present- she reports that patient with intermittent rashes for years  pt believes rash on chest is worse over the past week  history reviewed with patient and dermatology  No other acute events    PAST MEDICAL & SURGICAL HISTORY:  Pacemaker  Hypothyroid  Varicose vein of leg: had surgery in 1993  CAD (coronary artery disease)  Afib  History of total left knee replacement (TKR)    Allergies    afluzosin (Hives)  levofloxacin (Hives)  Myrbetriq (Hives)  tamsulosin (Hives)    Intolerances      Antimicrobials:    piperacillin/tazobactam IVPB.. 3.375 Gram(s) IV Intermittent every 8 hours    MEDICATIONS  (STANDING):  aspirin enteric coated 81 milliGRAM(s) Oral daily  betamethasone valerate 0.1% Cream 1 Application(s) Topical two times a day  chlorhexidine 2% Cloths 1 Application(s) Topical once  enoxaparin Injectable 40 milliGRAM(s) SubCutaneous daily  furosemide    Tablet 20 milliGRAM(s) Oral daily  levothyroxine 137 MICROGram(s) Oral daily  metoprolol succinate ER 50 milliGRAM(s) Oral daily  nystatin Powder 1 Application(s) Topical three times a day  piperacillin/tazobactam IVPB.. 3.375 Gram(s) IV Intermittent every 8 hours  potassium chloride    Tablet ER 20 milliEquivalent(s) Oral daily  simvastatin 40 milliGRAM(s) Oral at bedtime  sodium chloride 0.9%. 1000 milliLiter(s) (50 mL/Hr) IV Continuous <Continuous>      Vital Signs Last 24 Hrs  T(C): 36.4 (07-02-20 @ 10:48), Max: 37 (07-02-20 @ 04:44)  T(F): 97.5 (07-02-20 @ 10:48), Max: 98.6 (07-02-20 @ 04:44)  HR: 102 (07-02-20 @ 10:48) (63 - 102)  BP: 134/85 (07-02-20 @ 10:48) (134/85 - 164/80)  BP(mean): --  RR: 18 (07-02-20 @ 10:48) (17 - 18)  SpO2: 96% (07-02-20 @ 10:48) (95% - 96%)    Physical Exam:    Constitutional well preserved,comfortable,pleasant    HEENT lefr eye turns out    No oral exudate or erythema    Neck supple no JVD or LN    Chest Good AE,CTA    CVS RRR S1 S2     Abd soft BS normal No tenderness rash under pannus    Ext right foot with dryness around toes and scaley patches  bilat DP pulses    mac- pap rash on trunk and arms     IV site no erythema tenderness or discharge    Joints no swelling or LOM    CNS AAO X 3 no focal    Lab Data:                          10.7   5.39  )-----------( 156      ( 02 Jul 2020 07:38 )             33.1       07-02    142  |  106  |  24<H>  ----------------------------<  114<H>  4.1   |  27  |  1.07    Ca    8.9      02 Jul 2020 07:14  Phos  3.1     07-02  Mg     2.2     07-02                          WBC Count: 5.39 (07-02-20 @ 07:38)  WBC Count: 5.09 (07-01-20 @ 06:39)  WBC Count: 5.11 (06-29-20 @ 09:34)

## 2020-07-02 NOTE — PHYSICAL THERAPY INITIAL EVALUATION ADULT - ACTIVE RANGE OF MOTION EXAMINATION, REHAB EVAL
bilateral upper extremity Active ROM was WFL (within functional limits)/Right LE Active ROM was WFL (within functional limits)/R ankle not tested 2/2 pain. L knee fused into extension.

## 2020-07-02 NOTE — PHYSICAL THERAPY INITIAL EVALUATION ADULT - GENERAL OBSERVATIONS, REHAB EVAL
Pt s/p R foot pain, swelling, drainage, awaiting Angio. Pt rec'd semi supine in bed in NAD, reports increased discomfort with weightbearing on RLE, WBAT RLE, VSS.

## 2020-07-02 NOTE — CONSULT NOTE ADULT - ASSESSMENT
1) Irritant contact dermatitis w/ a background of stasis dermatitis on RLE  - STOP betadine and vinegar applications  - start clobetasol 0.05% ointment BID to affected area x 2 weeks.  - Moisturize w/ plain Vaseline BID after topical steroid application.  - No obvious sign of cellulitis but may be difficult to appreciate deeper process given overlying severe eczema. Patient already on zosyn.    2) Intertrigo 2/2 candidiasis, groin and abdominal folds  - start ketoconazole 2% cream BID to affected area.    3) Rash on trunk and upper extremities  Ddx: Juan disease vs. Darier disease vs. eczematous dermatitis vs. tinea  - start clobetasol 0.05% ointment BID to affected area x 1 week.    - Dermatology to sign off on this patient. Please re-consult for further questions or concerns. Pt can follow up with [facility] within 1-2 weeks after discharge from the hospital, [phone].

## 2020-07-02 NOTE — CONSULT NOTE ADULT - CONSULT REQUESTED DATE/TIME
02-Jul-2020 17:46
01-Jul-2020 12:09
01-Jul-2020 16:07
29-Jun-2020 12:01
30-Jun-2020
30-Jun-2020 12:58

## 2020-07-02 NOTE — PROGRESS NOTE ADULT - SUBJECTIVE AND OBJECTIVE BOX
CARDIOLOGY     PROGRESS  NOTE   ________________________________________________    CHIEF COMPLAINT:Patient is a 75y old  Male who presents with a chief complaint of right foot infection (01 Jul 2020 16:07)  no complain  	  REVIEW OF SYSTEMS:  CONSTITUTIONAL: No fever, weight loss, or fatigue  EYES: No eye pain, visual disturbances, or discharge  ENT:  No difficulty hearing, tinnitus, vertigo; No sinus or throat pain  NECK: No pain or stiffness  RESPIRATORY: No cough, wheezing, chills or hemoptysis; No Shortness of Breath  CARDIOVASCULAR: No chest pain, palpitations, passing out, dizziness, or leg swelling  GASTROINTESTINAL: No abdominal or epigastric pain. No nausea, vomiting, or hematemesis; No diarrhea or constipation. No melena or hematochezia.  GENITOURINARY: No dysuria, frequency, hematuria, or incontinence  NEUROLOGICAL: No headaches, memory loss, loss of strength, numbness, or tremors  SKIN: No itching, burning, rashes, or lesions   LYMPH Nodes: No enlarged glands  ENDOCRINE: No heat or cold intolerance; No hair loss  MUSCULOSKELETAL: No joint pain or swelling; No muscle, back, or extremity pain  PSYCHIATRIC: No depression, anxiety, mood swings, or difficulty sleeping  HEME/LYMPH: No easy bruising, or bleeding gums  ALLERGY AND IMMUNOLOGIC: No hives or eczema	    [ ] All others negative	  [ ] Unable to obtain    PHYSICAL EXAM:  T(C): 37 (07-02-20 @ 04:44), Max: 37 (07-02-20 @ 04:44)  HR: 88 (07-02-20 @ 04:44) (68 - 92)  BP: 156/69 (07-02-20 @ 04:44) (116/59 - 156/69)  RR: 17 (07-02-20 @ 04:44) (17 - 18)  SpO2: 95% (07-02-20 @ 04:44) (94% - 96%)  Wt(kg): --  I&O's Summary    30 Jun 2020 07:01  -  01 Jul 2020 07:00  --------------------------------------------------------  IN: 1300 mL / OUT: 3100 mL / NET: -1800 mL    01 Jul 2020 07:01  -  02 Jul 2020 06:57  --------------------------------------------------------  IN: 1560 mL / OUT: 1490 mL / NET: 70 mL        Appearance: Normal	  HEENT:   Normal oral mucosa, PERRL, EOMI	  Lymphatic: No lymphadenopathy  Cardiovascular: Normal S1 S2, No JVD, +murmurs, No edema  Respiratory: Lungs clear to auscultation	  Psychiatry: A & O x 3, Mood & affect appropriate  Gastrointestinal:  Soft, Non-tender, + BS	  Skin: No rashes, No ecchymoses, No cyanosis	  Neurologic: Non-focal  Extremities: Normal range of motion, No clubbing, cyanosis or edema  Vascular: + pvd , r foot ulcer    MEDICATIONS  (STANDING):  aspirin enteric coated 81 milliGRAM(s) Oral daily  chlorhexidine 2% Cloths 1 Application(s) Topical once  enoxaparin Injectable 40 milliGRAM(s) SubCutaneous daily  furosemide    Tablet 20 milliGRAM(s) Oral daily  levothyroxine 137 MICROGram(s) Oral daily  metoprolol succinate ER 25 milliGRAM(s) Oral daily  nystatin Powder 1 Application(s) Topical three times a day  piperacillin/tazobactam IVPB.. 3.375 Gram(s) IV Intermittent every 8 hours  potassium chloride    Tablet ER 20 milliEquivalent(s) Oral daily  simvastatin 40 milliGRAM(s) Oral at bedtime  sodium chloride 0.9%. 1000 milliLiter(s) (100 mL/Hr) IV Continuous <Continuous>      TELEMETRY: 	    ECG:  	  RADIOLOGY:  OTHER: 	  	  LABS:	 	    CARDIAC MARKERS:                                10.7   5.09  )-----------( 149      ( 01 Jul 2020 06:39 )             33.2     07-01    138  |  101  |  22  ----------------------------<  100<H>  3.7   |  26  |  1.06    Ca    8.9      01 Jul 2020 06:39      proBNP:   Lipid Profile:   HgA1c:   TSH: Thyroid Stimulating Hormone, Serum: 2.92 uIU/mL (06-30 @ 09:01)          Assessment and plan  ---------------------------  75 year old male PMH PPM, CAD s/p stent on ASA, prior A-fib not on AC, hypothyroid, and total left knee replacement p/w R foot swelling and pain. Patient states this has been worsening for past 1 year. Seen in ER 6/10 and started on Augmentin x10 days without improvement. Visiting nurse has been bathing foot w/ vinegar and warm water but discharge and bleeding has been increasing.   pt with hx of sss, ?paf , s/p ppm, cad, who presented to ER with possible cellulitis with LE edema  asa daily  abx as per MEDICINE  ppm interrogated runs of wct, +intermittent a.fib short burse  ac if no contraindication  le arterial doppler/ PVD  increase  beta blocker  WCT needs stress test

## 2020-07-02 NOTE — PROGRESS NOTE ADULT - SUBJECTIVE AND OBJECTIVE BOX
afebrile/  no cp/sob  REVIEW OF SYSTEMS:  GEN: no fever,    no chills  RESP: no SOB,   no cough  CVS: no chest pain,   no palpitations  GI: no abdominal pain,   no nausea,   no vomiting,   no constipation,   no diarrhea  : no dysuria,   no frequency  NEURO: no headache,   no dizziness  PSYCH: no depression,   not anxious  Derm : no rash    MEDICATIONS  (STANDING):  aspirin enteric coated 81 milliGRAM(s) Oral daily  chlorhexidine 2% Cloths 1 Application(s) Topical once  enoxaparin Injectable 40 milliGRAM(s) SubCutaneous daily  furosemide    Tablet 20 milliGRAM(s) Oral daily  levothyroxine 137 MICROGram(s) Oral daily  metoprolol succinate ER 50 milliGRAM(s) Oral daily  nystatin Powder 1 Application(s) Topical three times a day  piperacillin/tazobactam IVPB.. 3.375 Gram(s) IV Intermittent every 8 hours  potassium chloride    Tablet ER 20 milliEquivalent(s) Oral daily  simvastatin 40 milliGRAM(s) Oral at bedtime  sodium chloride 0.9%. 1000 milliLiter(s) (100 mL/Hr) IV Continuous <Continuous>    MEDICATIONS  (PRN):  hydrOXYzine hydrochloride 25 milliGRAM(s) Oral two times a day PRN Itching      Vital Signs Last 24 Hrs  T(C): 37 (02 Jul 2020 04:44), Max: 37 (02 Jul 2020 04:44)  T(F): 98.6 (02 Jul 2020 04:44), Max: 98.6 (02 Jul 2020 04:44)  HR: 88 (02 Jul 2020 04:44) (68 - 92)  BP: 156/69 (02 Jul 2020 04:44) (116/59 - 156/69)  BP(mean): --  RR: 17 (02 Jul 2020 04:44) (17 - 18)  SpO2: 95% (02 Jul 2020 04:44) (94% - 96%)  CAPILLARY BLOOD GLUCOSE        I&O's Summary    01 Jul 2020 07:01  -  02 Jul 2020 07:00  --------------------------------------------------------  IN: 1560 mL / OUT: 1490 mL / NET: 70 mL        PHYSICAL EXAM:  HEAD:  Atraumatic, Normocephalic  NECK: Supple, No   JVD  CHEST/LUNG:   no     rales,     no,    rhonchi  HEART: Regular rate and rhythm;         murmur  ABDOMEN: Soft, Nontender, ;   EXTREMITIES:   less     edema/  foot, ulcers  NEUROLOGY:  alert    LABS:                        10.7   5.09  )-----------( 149      ( 01 Jul 2020 06:39 )             33.2     07-01    138  |  101  |  22  ----------------------------<  100<H>  3.7   |  26  |  1.06    Ca    8.9      01 Jul 2020 06:39                      Thyroid Stimulating Hormone, Serum: 2.92 uIU/mL (06-30 @ 09:01)          Consultant(s) Notes Reviewed:      Care Discussed with Consultants/Other Providers: afebrile/  no cp/sob  REVIEW OF SYSTEMS:  GEN: no fever,    no chills  RESP: no SOB,   no cough  CVS: no chest pain,   no palpitations  GI: no abdominal pain,   no nausea,   no vomiting,   no constipation,   no diarrhea  : no dysuria,   no frequency  NEURO: no headache,   no dizziness  PSYCH: no depression,   not anxious  Derm :rash    MEDICATIONS  (STANDING):  aspirin enteric coated 81 milliGRAM(s) Oral daily  chlorhexidine 2% Cloths 1 Application(s) Topical once  enoxaparin Injectable 40 milliGRAM(s) SubCutaneous daily  furosemide    Tablet 20 milliGRAM(s) Oral daily  levothyroxine 137 MICROGram(s) Oral daily  metoprolol succinate ER 50 milliGRAM(s) Oral daily  nystatin Powder 1 Application(s) Topical three times a day  piperacillin/tazobactam IVPB.. 3.375 Gram(s) IV Intermittent every 8 hours  potassium chloride    Tablet ER 20 milliEquivalent(s) Oral daily  simvastatin 40 milliGRAM(s) Oral at bedtime  sodium chloride 0.9%. 1000 milliLiter(s) (100 mL/Hr) IV Continuous <Continuous>    MEDICATIONS  (PRN):  hydrOXYzine hydrochloride 25 milliGRAM(s) Oral two times a day PRN Itching      Vital Signs Last 24 Hrs  T(C): 37 (02 Jul 2020 04:44), Max: 37 (02 Jul 2020 04:44)  T(F): 98.6 (02 Jul 2020 04:44), Max: 98.6 (02 Jul 2020 04:44)  HR: 88 (02 Jul 2020 04:44) (68 - 92)  BP: 156/69 (02 Jul 2020 04:44) (116/59 - 156/69)  BP(mean): --  RR: 17 (02 Jul 2020 04:44) (17 - 18)  SpO2: 95% (02 Jul 2020 04:44) (94% - 96%)  CAPILLARY BLOOD GLUCOSE        I&O's Summary    01 Jul 2020 07:01  -  02 Jul 2020 07:00  --------------------------------------------------------  IN: 1560 mL / OUT: 1490 mL / NET: 70 mL        PHYSICAL EXAM:  HEAD:  Atraumatic, Normocephalic  NECK: Supple, No   JVD  CHEST/LUNG:   no     rales,     no,    rhonchi  HEART: Regular rate and rhythm;         murmur  ABDOMEN: Soft, Nontender, ;   EXTREMITIES:   less     edema/  foot, ulcers  NEUROLOGY:  alert    LABS:                        10.7   5.09  )-----------( 149      ( 01 Jul 2020 06:39 )             33.2     07-01    138  |  101  |  22  ----------------------------<  100<H>  3.7   |  26  |  1.06    Ca    8.9      01 Jul 2020 06:39                      Thyroid Stimulating Hormone, Serum: 2.92 uIU/mL (06-30 @ 09:01)          Consultant(s) Notes Reviewed:      Care Discussed with Consultants/Other Providers:

## 2020-07-02 NOTE — CONSULT NOTE ADULT - SUBJECTIVE AND OBJECTIVE BOX
HPI:  75 year old male PMH PPM, CAD s/p stent on ASA, prior A-fib not on AC, hypothyroid, and total left knee replacement p/w R foot swelling and pain. Patient states this has been worsening for past 1 year. Seen in ER 6/10 and started on Augmentin x10 days without improvement. Visiting nurse has been bathing foot w/ vinegar and warm water but discharge and bleeding has been increasing.     Spoke w/ Dr. Lainez who would like the patient admitted to Dr. Washington for right foot cellulitis. Patient also notes rash across the upper torso for past week, pruritic on R arm, and a rash in the groin. Patient denies f/c, cp, sob, abd pain, nausea, vomiting, diarrhea, constipation, or weakness.     Podiatry/Wound care- Dr. Fonseca (29 Jun 2020 11:54)    Rash on upper chest and back x years, recently spread to arms. Some areas itchy.    Also consulted for rash on R foot x years. Itchy around ankle and dorsal foot, while R 1st toe is painful.    Also consulted for rash in groin x months. Sometimes itchy.    PAST MEDICAL & SURGICAL HISTORY:  Pacemaker  Hypothyroid  Varicose vein of leg: had surgery in 1993  CAD (coronary artery disease)  Afib  History of total left knee replacement (TKR)      Review of Systems:  Constitutional: denies fevers, chills  HEENT: odynophagia or dysphagia  Cardiovascular: denies palpitations  Respiratory: denies SOB, wheezing  Gastrointestinal: denies N/V/D, abdominal pain  : denies dysuria  MSK: denies weakness, joint pain  Skin: denies new rashes or masses unless otherwise specified in hpi    MEDICATIONS  (STANDING):  aspirin enteric coated 81 milliGRAM(s) Oral daily  betamethasone valerate 0.1% Cream 1 Application(s) Topical two times a day  chlorhexidine 2% Cloths 1 Application(s) Topical once  enoxaparin Injectable 40 milliGRAM(s) SubCutaneous daily  furosemide    Tablet 20 milliGRAM(s) Oral daily  levothyroxine 137 MICROGram(s) Oral daily  metoprolol succinate ER 50 milliGRAM(s) Oral daily  nystatin Powder 1 Application(s) Topical three times a day  piperacillin/tazobactam IVPB.. 3.375 Gram(s) IV Intermittent every 8 hours  potassium chloride    Tablet ER 20 milliEquivalent(s) Oral daily  simvastatin 40 milliGRAM(s) Oral at bedtime  sodium chloride 0.9%. 1000 milliLiter(s) (50 mL/Hr) IV Continuous <Continuous>    MEDICATIONS  (PRN):  hydrOXYzine hydrochloride 25 milliGRAM(s) Oral two times a day PRN Itching      Allergies    afluzosin (Hives)  levofloxacin (Hives)  Myrbetriq (Hives)  tamsulosin (Hives)    Intolerances        SOCIAL HISTORY: denies drug use    FAMILY HISTORY:  No pertinent family history in first degree relatives      Vital Signs Last 24 Hrs  T(C): 36.4 (02 Jul 2020 10:48), Max: 37 (02 Jul 2020 04:44)  T(F): 97.5 (02 Jul 2020 10:48), Max: 98.6 (02 Jul 2020 04:44)  HR: 102 (02 Jul 2020 10:48) (63 - 102)  BP: 134/85 (02 Jul 2020 10:48) (134/85 - 164/80)  BP(mean): --  RR: 18 (02 Jul 2020 10:48) (17 - 18)  SpO2: 96% (02 Jul 2020 10:48) (95% - 96%)    Physical Exam:  The patient was alert and oriented X 3, well nourished, and in no apparent distress. Oropharynx showed no ulcerations. There was no visible lymphadenopathy. Conjunctiva were non-injected. There was no clubbing or edema of extremities.    The scalp, hair, face, eyebrows, lips, oropharynx , neck, chest, back, buttocks, extremities X 4, hands, feet, nails were examined. There was no hyperhidrosis or bromhidrosis.     The following lesions are noted:     numerous pink slightly scaly thin papules on upper chest, back, proximal upper arms    Slightly macerated erythema w/ satellite papules and pustules in groin and abdominal folds    Eczematous plaques on R ankle and dorsal foot, with erosions and fissuring, involving all toes.    LABS:                        10.7   5.39  )-----------( 156      ( 02 Jul 2020 07:38 )             33.1     07-02    142  |  106  |  24<H>  ----------------------------<  114<H>  4.1   |  27  |  1.07    Ca    8.9      02 Jul 2020 07:14  Phos  3.1     07-02  Mg     2.2     07-02      PT/INR - ( 02 Jul 2020 07:38 )   PT: 12.7 sec;   INR: 1.11 ratio         PTT - ( 02 Jul 2020 07:38 )  PTT:31.2 sec      RADIOLOGY & ADDITIONAL STUDIES: no relevant studies

## 2020-07-02 NOTE — PHYSICAL THERAPY INITIAL EVALUATION ADULT - MANUAL MUSCLE TESTING RESULTS, REHAB EVAL
Strength is grossly at least 3+/5 throughout BUE, R hip/knee, L hip. R ankle/foot not tested 2/2 pain, L knee locked in ext./grossly assessed due to

## 2020-07-02 NOTE — PROGRESS NOTE ADULT - ASSESSMENT
75 year old male PMH PPM, CAD s/p stent on ASA, prior A-fib not on AC, hypothyroid, and total left knee replacement p/w R foot swelling and pain. Patient states this has been worsening for past 1 year. Seen in ER 6/10 and started on Augmentin x10 days without improvement. Visiting nurse has been bathing foot w/ vinegar and warm water but discharge and bleeding has been increasing.     Spoke w/ Dr. Lainez who would like the patient admitted to Dr. Washington for right foot cellulitis. Patient also notes rash across the upper torso for past week, pruritic on R arm, and a rash in the groin. Patient denies f/c, cp, sob, abd pain, nausea, vomiting, diarrhea, constipation, or weakness.     Podiatry/Wound care- Dr. 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 pt ultimately had infection and required multiple surgeries.   he was on chronic suppression with doxy and cipro  he went with his girlfriend to her dermatologist a year ago. the dermatologist noticed a dark spot that she biopsied for cancer. the patient started leaking after the biopsy and has had problems since ( according to patient)    # rash  I was concerned that rash on trunk is from the penicillin Pt with different rash under pannus that seems fungal  pts foot is extremely dry. suggested  derm input Pt with 7 % eosinophils  After reviewing with dermatology, rash may be more a combination of stasis dermatitis and eczema with a fungal rash under pannus   Not clear that patient with a definite infection    # cellulitis  not a clear cellulitis  Toes are discolored and dry and wrinkled  There is a scaley area near the ankle , medial and on shin. suggest dermatology input for this as well  cultures noted. Zosyn would address all the bacteria isolated but i am worried it is causing a rash  check blood culture  IF derm agrees, may be able to d/c antibiotics    #vascular   pt with pulses bilaterally  vascular is following  ? some problems from venous insufficiency vs arterial insufficiency for dermatological issues or a combination thereof

## 2020-07-02 NOTE — PROGRESS NOTE ADULT - ASSESSMENT
75 year old male         PMH PPM, CAD s/p stent on ASA, A-fib, hypothyroid, and total left knee replacement      p/w R foot swelling and pain.     Patient states this has been worsening for past 1 year.  on  augmentin,  with  no  improvement   Patient also notes rash across the upper torso for past week, pruritic on R arm, and a rash in the groin         right foot cellulits affecting toes., on   IV Zosyn.     Podiatry will follow.   , foot x-rays negative for OM.     IVP Lasix ,   for bilateral leg edema.    LE venous dopplers, no  dvt      ASA   prior stent in 2007./   AFIB, PPM   h/o  AFIB,    no  a/c//  not  sure  why ?/    card  eval  Anemia,  gi  eval dr joann helton interrogate  PPM/ if  pt  has  afib, as stored   event, , then  will need  to start  a/c/  pt  made  aware   of  risks  out  pt  card is  dr mikayla dick  vascular  eval/  has  small  area  of  dry gangrene, toe   echo,  normal  Ef  PPM  interrogation  with  WCT/  stress test/  bursts  of  afib/  start  a/c,  after  angiogram   per  vascular,  for  angiogram  on 7/ 2   cleared  for  angio   today  derm eval  for   dermatitis/  on zosyn/ seen by  ID 75 year old male         PMH PPM, CAD s/p stent on ASA, A-fib, hypothyroid, and total left knee replacement      p/w R foot swelling and pain.     Patient states this has been worsening for past 1 year.  on  augmentin,  with  no  improvement   Patient also notes rash across the upper torso/ back  for past week, pruritic on R arm, and rash in the groin         Right foot cellulits affecting toes./  inter  digital  uklcers ,, on   IV Zosyn.     Podiatry   saw  pt/  no  intervention/  foot x-rays negative for OM.     IVP Lasix ,   for bilateral leg edema. . ha s impproved   LE venous dopplers, no  dvt      ASA   prior stent in 2007./   AFIB, PPM   h/o  AFIB,    no  a/c//  not  sure  why ?/    card  eval  Anemia,  gi  eval dr joann sanchez   interrogate  PPM/ if  pt  has  afib, as stored   event, , then  will need  to start  a/c/  pt  made  aware   of  risks  out  pt  card is  dr mikayla dick  vascular  eval/  has  small  area  of  dry gangrene, toe   echo,  normal  Ef  PPM  interrogation  with  WCT/   bursts  of  afib/    start  a/c,  after  angiogram/  stress  test   for  WCT   per  vascular,  for  angiogram  on 7/ 2   cleared  for  angio   today  derm eval  for   dermatitis/ on   atarax/  lidex  cream    on zosyn/ seen by  ID

## 2020-07-02 NOTE — PHYSICAL THERAPY INITIAL EVALUATION ADULT - PRECAUTIONS/LIMITATIONS, REHAB EVAL
cardiac precautions/fall precautions/CONTINUED- VA PHYSIOL ELVIS: There is a blood pressure discrepancy of 15 mmHg between the right and left upper arms. No lower extremity arterial vascular disease is identified.; TTE:  Mitral annular calcification, otherwise normal mitral valve. Moderate mitral regurgitation. Eccentric anteriorly directed jet. Calcified trileaflet aortic valve with normal opening. Peak transaortic valve gradient equals 15 mm Hg, aortic valve velocity time integral equals 38 cm, estimated aortic valve area equals 3.1 sqcm. Mild aortic regurgitation. Severely dilated left atrium.  LA volume index = 49cc/m2. Eccentric left ventricular hypertrophy (dilated left ventricle with normal relative wall thickness). Endocardium not well visualized; grossly normal left ventricular systolic function. Regional wall motion could not be accurately assessed. The right ventricle is not well visualized; grossly normal right ventricular systolic function. TV s' = 21cm/sec. Estimated right ventricular systolic pressure equals 36 mm Hg, assuming right atrial pressure equals 8 mm Hg, consistent with borderline pulmonary hypertension.; VA DUPLEX VEIN BLE: No evidence of deep venous thrombosis in either lower extremity.; XRAY/FLUOROSCOPY R FOOT: No radiographic evidence of advanced osteomyelitis.

## 2020-07-02 NOTE — PROGRESS NOTE ADULT - ASSESSMENT
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 Concern for venous insufficiency and arterial insufficiency w small vessel arterial disease    Plan:  - Right lower extremity angiogram today   - Pre-procedural labs   - Continue woundcare   - Appreciate medicine clearance for angio   - Consent signed and in chart   - C/w care per primary team     Please contact Vascular Surgery (p. [phone]) with any questions.     Ying Dao, PGY2  Surgery, Vascular Team   Pager [phone]  [facility]

## 2020-07-02 NOTE — PHYSICAL THERAPY INITIAL EVALUATION ADULT - PLANNED THERAPY INTERVENTIONS, PT EVAL
gait training/balance training/GOAL: Pt will perform 3 stairs with or without U HR as needed within 4weeks.

## 2020-07-02 NOTE — PHYSICAL THERAPY INITIAL EVALUATION ADULT - ADDITIONAL COMMENTS
Pt lives alone in 2nd floor coop with 2 steps to enter and elevator access. Pt states he is indep with all ADLs owns hip kit to assist with don/dof socks and long shoe horn. Pt amb indep with SC, however owns RW, and three wheels walker. Pt states he was receiving home nursing services to his foot prior to admission. Pt reports his main limitation in function is due to his foot discomfort however still able to perform tasks indep. Pt states L knee fused into ext and shorter than RLE, requires external lift to R shoe.

## 2020-07-02 NOTE — PHYSICAL THERAPY INITIAL EVALUATION ADULT - PERTINENT HX OF CURRENT PROBLEM, REHAB EVAL
75 year old male PMH PPM, CAD s/p stent on ASA, A-fib, hypothyroid, and total left knee replacement p/w R foot swelling and pain. Patient states this has been worsening for past 1 year. Seen in ER 6/10 and started on Augmentin x10 days without improvement. Concern for venous/arterial insufficiency.

## 2020-07-03 PROCEDURE — 99232 SBSQ HOSP IP/OBS MODERATE 35: CPT

## 2020-07-03 RX ORDER — ACETAMINOPHEN 500 MG
650 TABLET ORAL ONCE
Refills: 0 | Status: COMPLETED | OUTPATIENT
Start: 2020-07-03 | End: 2020-07-03

## 2020-07-03 RX ORDER — ENOXAPARIN SODIUM 100 MG/ML
100 INJECTION SUBCUTANEOUS
Refills: 0 | Status: DISCONTINUED | OUTPATIENT
Start: 2020-07-03 | End: 2020-07-06

## 2020-07-03 RX ADMIN — NYSTATIN CREAM 1 APPLICATION(S): 100000 CREAM TOPICAL at 20:56

## 2020-07-03 RX ADMIN — PIPERACILLIN AND TAZOBACTAM 25 GRAM(S): 4; .5 INJECTION, POWDER, LYOPHILIZED, FOR SOLUTION INTRAVENOUS at 05:03

## 2020-07-03 RX ADMIN — ENOXAPARIN SODIUM 100 MILLIGRAM(S): 100 INJECTION SUBCUTANEOUS at 17:53

## 2020-07-03 RX ADMIN — Medication 650 MILLIGRAM(S): at 12:49

## 2020-07-03 RX ADMIN — Medication 137 MICROGRAM(S): at 05:02

## 2020-07-03 RX ADMIN — Medication 1 APPLICATION(S): at 05:01

## 2020-07-03 RX ADMIN — PIPERACILLIN AND TAZOBACTAM 25 GRAM(S): 4; .5 INJECTION, POWDER, LYOPHILIZED, FOR SOLUTION INTRAVENOUS at 20:56

## 2020-07-03 RX ADMIN — Medication 20 MILLIEQUIVALENT(S): at 12:50

## 2020-07-03 RX ADMIN — NYSTATIN CREAM 1 APPLICATION(S): 100000 CREAM TOPICAL at 05:02

## 2020-07-03 RX ADMIN — Medication 20 MILLIGRAM(S): at 05:02

## 2020-07-03 RX ADMIN — SIMVASTATIN 40 MILLIGRAM(S): 20 TABLET, FILM COATED ORAL at 20:56

## 2020-07-03 RX ADMIN — NYSTATIN CREAM 1 APPLICATION(S): 100000 CREAM TOPICAL at 12:50

## 2020-07-03 RX ADMIN — Medication 81 MILLIGRAM(S): at 12:50

## 2020-07-03 RX ADMIN — PIPERACILLIN AND TAZOBACTAM 25 GRAM(S): 4; .5 INJECTION, POWDER, LYOPHILIZED, FOR SOLUTION INTRAVENOUS at 12:50

## 2020-07-03 RX ADMIN — Medication 50 MILLIGRAM(S): at 05:02

## 2020-07-03 RX ADMIN — Medication 1 APPLICATION(S): at 17:53

## 2020-07-03 NOTE — PROGRESS NOTE ADULT - ASSESSMENT
75 year old male         PMH PPM, CAD s/p stent on ASA, A-fib, hypothyroid, and total left knee replacement      p/w R foot swelling and pain.     Patient states this has been worsening for past 1 year.  on  augmentin,  with  no  improvement   Patient also notes rash across the upper torso/ back  for past week, pruritic on R arm, and rash in the groin         Right foot cellulits affecting toes./  inter  digital  uklcers ,, on   IV Zosyn.     Podiatry   saw  pt/  no  intervention/  foot x-rays negative for OM.     IVP Lasix ,   for bilateral leg edema. . ha s impproved   LE venous dopplers, no  dvt      ASA   prior stent in 2007./   AFIB, PPM   h/o  AFIB,    no  a/c//  not  sure  why ?/    card  eval  Anemia,  gi  eval dr joann sanchez   interrogate  PPM/ if  pt  has  afib, as stored   event, , then  will need  to start  a/c/  pt  made  aware   of  risks  out  pt  card is  dr mikayla dick     echo,  normal  Ef  PPM  interrogation  with  WCT/   bursts  of  afib/   on lovenox  bid    stress  test   for  WCT  derm eval  for   dermatitis/ on   atarax/  lidex  cream    on zosyn/ seen by  ID  angio on Monday ,  per  vascular

## 2020-07-03 NOTE — PROGRESS NOTE ADULT - SUBJECTIVE AND OBJECTIVE BOX
no  cp/sob    REVIEW OF SYSTEMS:  GEN: no fever,    no chills  RESP: no SOB,   no cough  CVS: no chest pain,   no palpitations  GI: no abdominal pain,   no nausea,   no vomiting,   no constipation,   no diarrhea  : no dysuria,   no frequency  NEURO: no headache,   no dizziness  PSYCH: no depression,   not anxious  Derm : no rash    MEDICATIONS  (STANDING):  aspirin enteric coated 81 milliGRAM(s) Oral daily  betamethasone valerate 0.1% Cream 1 Application(s) Topical two times a day  chlorhexidine 2% Cloths 1 Application(s) Topical once  enoxaparin Injectable 40 milliGRAM(s) SubCutaneous daily  furosemide    Tablet 20 milliGRAM(s) Oral daily  levothyroxine 137 MICROGram(s) Oral daily  metoprolol succinate ER 50 milliGRAM(s) Oral daily  nystatin Powder 1 Application(s) Topical three times a day  piperacillin/tazobactam IVPB.. 3.375 Gram(s) IV Intermittent every 8 hours  potassium chloride    Tablet ER 20 milliEquivalent(s) Oral daily  simvastatin 40 milliGRAM(s) Oral at bedtime  sodium chloride 0.9%. 1000 milliLiter(s) (50 mL/Hr) IV Continuous <Continuous>    MEDICATIONS  (PRN):  hydrOXYzine hydrochloride 25 milliGRAM(s) Oral two times a day PRN Itching      Vital Signs Last 24 Hrs  T(C): 36.7 (03 Jul 2020 04:59), Max: 37.1 (02 Jul 2020 20:30)  T(F): 98.1 (03 Jul 2020 04:59), Max: 98.8 (02 Jul 2020 20:30)  HR: 65 (03 Jul 2020 04:59) (60 - 102)  BP: 114/57 (03 Jul 2020 04:59) (114/57 - 164/80)  BP(mean): --  RR: 19 (03 Jul 2020 04:59) (18 - 19)  SpO2: 97% (03 Jul 2020 04:59) (94% - 97%)  CAPILLARY BLOOD GLUCOSE        I&O's Summary    02 Jul 2020 07:01  -  03 Jul 2020 07:00  --------------------------------------------------------  IN: 1560 mL / OUT: 1750 mL / NET: -190 mL        PHYSICAL EXAM:  HEAD:  Atraumatic, Normocephalic  NECK: Supple, No   JVD  CHEST/LUNG:   no     rales,     no,    rhonchi  HEART: Regular rate and rhythm;         murmur  ABDOMEN: Soft, Nontender, ;   EXTREMITIES:        foot  ulcers  NEUROLOGY:  alert    LABS:                        10.7   5.39  )-----------( 156      ( 02 Jul 2020 07:38 )             33.1     07-02    142  |  106  |  24<H>  ----------------------------<  114<H>  4.1   |  27  |  1.07    Ca    8.9      02 Jul 2020 07:14  Phos  3.1     07-02  Mg     2.2     07-02      PT/INR - ( 02 Jul 2020 07:38 )   PT: 12.7 sec;   INR: 1.11 ratio         PTT - ( 02 Jul 2020 07:38 )  PTT:31.2 sec                Thyroid Stimulating Hormone, Serum: 2.92 uIU/mL (06-30 @ 09:01)          Consultant(s) Notes Reviewed:      Care Discussed with Consultants/Other Providers:

## 2020-07-03 NOTE — CHART NOTE - NSCHARTNOTEFT_GEN_A_CORE
VASCULAR SURGERY    Angiogram planned for Monday following cardiac stress test. NPO past midnight on Sunday and preop labs.    [id number] with questions

## 2020-07-03 NOTE — PROGRESS NOTE ADULT - SUBJECTIVE AND OBJECTIVE BOX
CARDIOLOGY     PROGRESS  NOTE   ________________________________________________    CHIEF COMPLAINT:Patient is a 75y old  Male who presents with a chief complaint of right foot infection (01 Jul 2020 16:07)  no complain.  	  REVIEW OF SYSTEMS:  CONSTITUTIONAL: No fever, weight loss, or fatigue  EYES: No eye pain, visual disturbances, or discharge  ENT:  No difficulty hearing, tinnitus, vertigo; No sinus or throat pain  NECK: No pain or stiffness  RESPIRATORY: No cough, wheezing, chills or hemoptysis; No Shortness of Breath  CARDIOVASCULAR: No chest pain, palpitations, passing out, dizziness, or leg swelling  GASTROINTESTINAL: No abdominal or epigastric pain. No nausea, vomiting, or hematemesis; No diarrhea or constipation. No melena or hematochezia.  GENITOURINARY: No dysuria, frequency, hematuria, or incontinence  NEUROLOGICAL: No headaches, memory loss, loss of strength, numbness, or tremors  SKIN: No itching, burning, rashes, or lesions   LYMPH Nodes: No enlarged glands  ENDOCRINE: No heat or cold intolerance; No hair loss  MUSCULOSKELETAL: No joint pain or swelling; No muscle, back, or extremity pain  PSYCHIATRIC: No depression, anxiety, mood swings, or difficulty sleeping  HEME/LYMPH: No easy bruising, or bleeding gums  ALLERGY AND IMMUNOLOGIC: No hives or eczema	    [ ] All others negative	  [ ] Unable to obtain    PHYSICAL EXAM:  T(C): 36.7 (07-03-20 @ 04:59), Max: 37.1 (07-02-20 @ 20:30)  HR: 65 (07-03-20 @ 04:59) (60 - 102)  BP: 114/57 (07-03-20 @ 04:59) (114/57 - 164/80)  RR: 19 (07-03-20 @ 04:59) (18 - 19)  SpO2: 97% (07-03-20 @ 04:59) (94% - 97%)  Wt(kg): --  I&O's Summary    02 Jul 2020 07:01  -  03 Jul 2020 07:00  --------------------------------------------------------  IN: 1560 mL / OUT: 1750 mL / NET: -190 mL        Appearance: Normal	  HEENT:   Normal oral mucosa, PERRL, EOMI	  Lymphatic: No lymphadenopathy  Cardiovascular: Normal S1 S2, No JVD, No murmurs, No edema  Respiratory: Lungs clear to auscultation	  Psychiatry: A & O x 3, Mood & affect appropriate  Gastrointestinal:  Soft, Non-tender, + BS	  Skin: No rashes, No ecchymoses, No cyanosis	  Neurologic: Non-focal  Extremities: Normal range of motion, No clubbing, cyanosis or edema  Vascular: + pvd/ foot ulcer    MEDICATIONS  (STANDING):  aspirin enteric coated 81 milliGRAM(s) Oral daily  betamethasone valerate 0.1% Cream 1 Application(s) Topical two times a day  enoxaparin Injectable 100 milliGRAM(s) SubCutaneous two times a day  furosemide    Tablet 20 milliGRAM(s) Oral daily  levothyroxine 137 MICROGram(s) Oral daily  metoprolol succinate ER 50 milliGRAM(s) Oral daily  nystatin Powder 1 Application(s) Topical three times a day  piperacillin/tazobactam IVPB.. 3.375 Gram(s) IV Intermittent every 8 hours  potassium chloride    Tablet ER 20 milliEquivalent(s) Oral daily  simvastatin 40 milliGRAM(s) Oral at bedtime      TELEMETRY: 	    ECG:  	  RADIOLOGY:  OTHER: 	  	  LABS:	 	    CARDIAC MARKERS:                                10.7   5.39  )-----------( 156      ( 02 Jul 2020 07:38 )             33.1     07-02    142  |  106  |  24<H>  ----------------------------<  114<H>  4.1   |  27  |  1.07    Ca    8.9      02 Jul 2020 07:14  Phos  3.1     07-02  Mg     2.2     07-02      proBNP:   Lipid Profile:   HgA1c:   TSH: Thyroid Stimulating Hormone, Serum: 2.92 uIU/mL (06-30 @ 09:01)    PT/INR - ( 02 Jul 2020 07:38 )   PT: 12.7 sec;   INR: 1.11 ratio         PTT - ( 02 Jul 2020 07:38 )  PTT:31.2 sec    Angiogram planned for Monday following cardiac stress test. NPO past midnight on Sunday and preop labs.  Assessment and plan  ---------------------------  75 year old male PMH PPM, CAD s/p stent on ASA, prior A-fib not on AC, hypothyroid, and total left knee replacement p/w R foot swelling and pain. Patient states this has been worsening for past 1 year. Seen in ER 6/10 and started on Augmentin x10 days without improvement. Visiting nurse has been bathing foot w/ vinegar and warm water but discharge and bleeding has been increasing.   pt with hx of sss, ?paf , s/p ppm, cad, who presented to ER with possible cellulitis with LE edema  asa daily  abx as per MEDICINE  ppm interrogated runs of wct, +intermittent a.fib short burse  ac if no contraindication  le arterial doppler/ PVD  increase  beta blocker  WCT needs stress test

## 2020-07-04 PROCEDURE — 99233 SBSQ HOSP IP/OBS HIGH 50: CPT

## 2020-07-04 PROCEDURE — 99232 SBSQ HOSP IP/OBS MODERATE 35: CPT

## 2020-07-04 RX ORDER — NYSTATIN CREAM 100000 [USP'U]/G
1 CREAM TOPICAL
Refills: 0 | Status: DISCONTINUED | OUTPATIENT
Start: 2020-07-04 | End: 2020-07-06

## 2020-07-04 RX ADMIN — Medication 81 MILLIGRAM(S): at 12:27

## 2020-07-04 RX ADMIN — SIMVASTATIN 40 MILLIGRAM(S): 20 TABLET, FILM COATED ORAL at 21:12

## 2020-07-04 RX ADMIN — NYSTATIN CREAM 1 APPLICATION(S): 100000 CREAM TOPICAL at 04:53

## 2020-07-04 RX ADMIN — PIPERACILLIN AND TAZOBACTAM 25 GRAM(S): 4; .5 INJECTION, POWDER, LYOPHILIZED, FOR SOLUTION INTRAVENOUS at 04:53

## 2020-07-04 RX ADMIN — Medication 20 MILLIEQUIVALENT(S): at 12:27

## 2020-07-04 RX ADMIN — Medication 1 APPLICATION(S): at 04:53

## 2020-07-04 RX ADMIN — Medication 1 APPLICATION(S): at 18:12

## 2020-07-04 RX ADMIN — ENOXAPARIN SODIUM 100 MILLIGRAM(S): 100 INJECTION SUBCUTANEOUS at 04:53

## 2020-07-04 RX ADMIN — Medication 137 MICROGRAM(S): at 04:53

## 2020-07-04 RX ADMIN — ENOXAPARIN SODIUM 100 MILLIGRAM(S): 100 INJECTION SUBCUTANEOUS at 18:11

## 2020-07-04 RX ADMIN — Medication 20 MILLIGRAM(S): at 04:53

## 2020-07-04 RX ADMIN — Medication 50 MILLIGRAM(S): at 04:53

## 2020-07-04 RX ADMIN — NYSTATIN CREAM 1 APPLICATION(S): 100000 CREAM TOPICAL at 18:12

## 2020-07-04 NOTE — PROGRESS NOTE ADULT - SUBJECTIVE AND OBJECTIVE BOX
no  complaints    REVIEW OF SYSTEMS:  GEN: no fever,    no chills  RESP: no SOB,   no cough  CVS: no chest pain,   no palpitations  GI: no abdominal pain,   no nausea,   no vomiting,   no constipation,   no diarrhea  : no dysuria,   no frequency  NEURO: no headache,   no dizziness  PSYCH: no depression,   not anxious  Derm : no rash    MEDICATIONS  (STANDING):  aspirin enteric coated 81 milliGRAM(s) Oral daily  betamethasone valerate 0.1% Cream 1 Application(s) Topical two times a day  enoxaparin Injectable 100 milliGRAM(s) SubCutaneous two times a day  furosemide    Tablet 20 milliGRAM(s) Oral daily  levothyroxine 137 MICROGram(s) Oral daily  metoprolol succinate ER 50 milliGRAM(s) Oral daily  nystatin Powder 1 Application(s) Topical three times a day  piperacillin/tazobactam IVPB.. 3.375 Gram(s) IV Intermittent every 8 hours  potassium chloride    Tablet ER 20 milliEquivalent(s) Oral daily  simvastatin 40 milliGRAM(s) Oral at bedtime    MEDICATIONS  (PRN):  hydrOXYzine hydrochloride 25 milliGRAM(s) Oral two times a day PRN Itching      Vital Signs Last 24 Hrs  T(C): 36.7 (04 Jul 2020 04:50), Max: 36.8 (03 Jul 2020 11:21)  T(F): 98 (04 Jul 2020 04:50), Max: 98.3 (03 Jul 2020 11:21)  HR: 88 (04 Jul 2020 04:50) (73 - 88)  BP: 122/56 (04 Jul 2020 04:50) (122/56 - 132/58)  BP(mean): --  RR: 18 (04 Jul 2020 04:50) (18 - 18)  SpO2: 97% (04 Jul 2020 04:50) (95% - 97%)  CAPILLARY BLOOD GLUCOSE        I&O's Summary    02 Jul 2020 07:01  -  03 Jul 2020 07:00  --------------------------------------------------------  IN: 1560 mL / OUT: 1750 mL / NET: -190 mL    03 Jul 2020 07:01  -  04 Jul 2020 06:14  --------------------------------------------------------  IN: 930 mL / OUT: 2120 mL / NET: -1190 mL        PHYSICAL EXAM:  HEAD:  Atraumatic, Normocephalic  NECK: Supple, No   JVD  CHEST/LUNG:   no     rales,     no,    rhonchi  HEART: Regular rate and rhythm;         murmur  ABDOMEN: Soft, Nontender, ;   EXTREMITIES:     less   edema  NEUROLOGY:  alert    LABS:                        10.7   5.39  )-----------( 156      ( 02 Jul 2020 07:38 )             33.1     07-02    142  |  106  |  24<H>  ----------------------------<  114<H>  4.1   |  27  |  1.07    Ca    8.9      02 Jul 2020 07:14  Phos  3.1     07-02  Mg     2.2     07-02      PT/INR - ( 02 Jul 2020 07:38 )   PT: 12.7 sec;   INR: 1.11 ratio         PTT - ( 02 Jul 2020 07:38 )  PTT:31.2 sec                Thyroid Stimulating Hormone, Serum: 2.92 uIU/mL (06-30 @ 09:01)          Consultant(s) Notes Reviewed:      Care Discussed with Consultants/Other Providers:

## 2020-07-04 NOTE — PROGRESS NOTE ADULT - SUBJECTIVE AND OBJECTIVE BOX
75y old  Male who presents with a chief complaint of right foot infection (01 Jul 2020 16:07)      Interval history:  Afebrile, pt says the foot looks the same since admission.       Allergies:   afluzosin (Hives)  levofloxacin (Hives)  Myrbetriq (Hives)  tamsulosin (Hives)      Antimicrobials:      REVIEW OF SYSTEMS:  No chest pain   No SOB  No N/V  No dysuria   no new rash.       Vital Signs Last 24 Hrs  T(C): 36.4 (07-04-20 @ 13:26), Max: 36.7 (07-03-20 @ 19:19)  T(F): 97.6 (07-04-20 @ 13:26), Max: 98 (07-03-20 @ 19:19)  HR: 60 (07-04-20 @ 13:26) (60 - 88)  BP: 131/64 (07-04-20 @ 13:26) (122/56 - 132/58)  BP(mean): --  RR: 18 (07-04-20 @ 13:26) (18 - 18)  SpO2: 95% (07-04-20 @ 13:26) (95% - 97%)      PHYSICAL EXAM:  Patient in no acute distress. Alert, awake.   No icterus, no oral ulcers.  Cardiovascular: S1S2 normal.  Lungs: + air entry B/L lung fields.  Gastrointestinal: soft, nontender, nondistended.  Extremities: Lt LE edema with diffuse erythema, rt LE with scaly petechial, rash with dry skin, toes with yellow discoloration.   Rash, papular appearing chest upper extremities.   IV sites not inflamed.         No new labs performed today          Radiology:  < from: Xray Chest 1 View- PORTABLE-Urgent (07.01.20 @ 20:07) >  IMPRESSION:     Clear lungs.

## 2020-07-04 NOTE — PROGRESS NOTE ADULT - ASSESSMENT
75 year old male PMH PPM, CAD s/p stent on ASA, prior A-fib not on AC, hypothyroid, and total left knee replacement p/w R foot swelling and pain. Patient states this has been worsening for past 1 year. Seen in ER 6/10 and started on Augmentin x10 days without improvement. Visiting nurse has been bathing foot w/ vinegar and warm water but discharge and bleeding has been increasing.     Spoke w/ Dr. Lainez who would like the patient admitted to Dr. Washington for right foot cellulitis. Patient also notes rash across the upper torso for past week, pruritic on R arm, and a rash in the groin. Patient denies f/c, cp, sob, abd pain, nausea, vomiting, diarrhea, constipation, or weakness.     Podiatry/Wound care- Dr. 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 pt ultimately had infection and required multiple surgeries.   he was on chronic suppression with doxy and cipro  he went with his girlfriend to her dermatologist a year ago. the dermatologist noticed a dark spot that she biopsied for cancer. the patient started leaking after the biopsy and has had problems since ( according to patient)      # cellulitis: changes on the foot going on for almost a year, given the chronicity of symptoms and no systemic signs of infection doubt cellulitis   also pt mentions no change in the foot even after 6 days of abx also pointing towards non infectious etiology   s/p derm eval, per derm doubt cellulitis  stop zosyn and observe off  topical agent application per derm   c/w wound care     Rash: does not appear drug rash.

## 2020-07-04 NOTE — PROGRESS NOTE ADULT - ASSESSMENT
75 year old male         PMH PPM, CAD s/p stent on ASA, A-fib, hypothyroid, and total left knee replacement      p/w R foot swelling and pain.     Patient states this has been worsening for past 1 year.  on  augmentin,  with  no  improvement   Patient also notes rash across the upper torso/ back  for past week, pruritic on R arm, and rash in the groin         Right foot cellulits affecting toes./  inter  digital  uklcers ,, on   IV Zosyn.     Podiatry   saw  pt/  no  intervention/  foot x-rays negative for OM.     IVP Lasix ,   for bilateral leg edema. . ha s impproved   LE venous dopplers, no  dvt      ASA   prior stent in 2007./   AFIB, PPM   h/o  AFIB,    no  a/c//  not  sure  why ?/    card  eval  Anemia,  gi  eval dr joann sanchez   interrogate  PPM/ if  pt  has  afib, as stored   event, , then  will need  to start  a/c/  pt  made  aware   of  risks  out  pt  card is  dr mikayla dick     echo,  normal  Ef  PPM  interrogation  with  WCT/   bursts  of  afib/   on lovenox  bid    stress  test   for  WCT  derm eval   noted,   dermatitis/ on   atarax/  lidex  cream    on zosyn/ seen by  ID  angio on Monday ,  per  vascular  awaiting stress  test

## 2020-07-04 NOTE — PROGRESS NOTE ADULT - SUBJECTIVE AND OBJECTIVE BOX
CARDIOLOGY     PROGRESS  NOTE   ________________________________________________    CHIEF COMPLAINT:Patient is a 75y old  Male who presents with a chief complaint of right foot infection (01 Jul 2020 16:07)  no complain.  	  REVIEW OF SYSTEMS:  CONSTITUTIONAL: No fever, weight loss, or fatigue  EYES: No eye pain, visual disturbances, or discharge  ENT:  No difficulty hearing, tinnitus, vertigo; No sinus or throat pain  NECK: No pain or stiffness  RESPIRATORY: No cough, wheezing, chills or hemoptysis; No Shortness of Breath  CARDIOVASCULAR: No chest pain, palpitations, passing out, dizziness, or leg swelling  GASTROINTESTINAL: No abdominal or epigastric pain. No nausea, vomiting, or hematemesis; No diarrhea or constipation. No melena or hematochezia.  GENITOURINARY: No dysuria, frequency, hematuria, or incontinence  NEUROLOGICAL: No headaches, memory loss, loss of strength, numbness, or tremors  SKIN: No itching, burning, rashes, or lesions   LYMPH Nodes: No enlarged glands  ENDOCRINE: No heat or cold intolerance; No hair loss  MUSCULOSKELETAL: No joint pain or swelling; No muscle, back, or extremity pain  PSYCHIATRIC: No depression, anxiety, mood swings, or difficulty sleeping  HEME/LYMPH: No easy bruising, or bleeding gums  ALLERGY AND IMMUNOLOGIC: No hives or eczema	    [ ] All others negative	  [x ] Unable to obtain    PHYSICAL EXAM:  T(C): 36.7 (07-04-20 @ 04:50), Max: 36.8 (07-03-20 @ 11:21)  HR: 88 (07-04-20 @ 04:50) (73 - 88)  BP: 122/56 (07-04-20 @ 04:50) (122/56 - 132/58)  RR: 18 (07-04-20 @ 04:50) (18 - 18)  SpO2: 97% (07-04-20 @ 04:50) (95% - 97%)  Wt(kg): --  I&O's Summary    03 Jul 2020 07:01  -  04 Jul 2020 07:00  --------------------------------------------------------  IN: 930 mL / OUT: 2120 mL / NET: -1190 mL    04 Jul 2020 07:01  -  04 Jul 2020 08:50  --------------------------------------------------------  IN: 0 mL / OUT: 650 mL / NET: -650 mL        Appearance: Normal	  HEENT:   Normal oral mucosa, PERRL, EOMI	  Lymphatic: No lymphadenopathy  Cardiovascular: Normal S1 S2, No JVD, + murmurs, No edema  Respiratory: Lungs clear to auscultation	  Psychiatry: A & O x 3, Mood & affect appropriate  Gastrointestinal:  Soft, Non-tender, + BS	  Skin: No rashes, No ecchymoses, No cyanosis	  Neurologic: Non-focal  Extremities: Normal range of motion, No clubbing, cyanosis or edema  Vascular: + pvd/ ulcer r foot    MEDICATIONS  (STANDING):  aspirin enteric coated 81 milliGRAM(s) Oral daily  betamethasone valerate 0.1% Cream 1 Application(s) Topical two times a day  enoxaparin Injectable 100 milliGRAM(s) SubCutaneous two times a day  furosemide    Tablet 20 milliGRAM(s) Oral daily  levothyroxine 137 MICROGram(s) Oral daily  metoprolol succinate ER 50 milliGRAM(s) Oral daily  nystatin Powder 1 Application(s) Topical three times a day  piperacillin/tazobactam IVPB.. 3.375 Gram(s) IV Intermittent every 8 hours  potassium chloride    Tablet ER 20 milliEquivalent(s) Oral daily  simvastatin 40 milliGRAM(s) Oral at bedtime      TELEMETRY: 	    ECG:  	  RADIOLOGY:  OTHER: 	  	  LABS:	 	    CARDIAC MARKERS:                  proBNP:   Lipid Profile:   HgA1c:   TSH: Thyroid Stimulating Hormone, Serum: 2.92 uIU/mL (06-30 @ 09:01)          Assessment and plan  ---------------------------  75 year old male PMH PPM, CAD s/p stent on ASA, prior A-fib not on AC, hypothyroid, and total left knee replacement p/w R foot swelling and pain. Patient states this has been worsening for past 1 year. Seen in ER 6/10 and started on Augmentin x10 days without improvement. Visiting nurse has been bathing foot w/ vinegar and warm water but discharge and bleeding has been increasing.   pt with hx of sss, ?paf , s/p ppm, cad, who presented to ER with possible cellulitis with LE edema  asa daily  abx as per MEDICINE  ppm interrogated runs of wct, +intermittent a.fib short burse  ac if no contraindication  le arterial doppler/ PVD  increase  beta blocker  WCT needs stress test, awaiting

## 2020-07-05 ENCOUNTER — TRANSCRIPTION ENCOUNTER (OUTPATIENT)
Age: 75
End: 2020-07-05

## 2020-07-05 LAB
ANION GAP SERPL CALC-SCNC: 10 MMOL/L — SIGNIFICANT CHANGE UP (ref 5–17)
ANION GAP SERPL CALC-SCNC: 11 MMOL/L — SIGNIFICANT CHANGE UP (ref 5–17)
APTT BLD: 38 SEC — HIGH (ref 27.5–35.5)
APTT BLD: 39.5 SEC — HIGH (ref 27.5–35.5)
BLD GP AB SCN SERPL QL: NEGATIVE — SIGNIFICANT CHANGE UP
BLD GP AB SCN SERPL QL: NEGATIVE — SIGNIFICANT CHANGE UP
BUN SERPL-MCNC: 31 MG/DL — HIGH (ref 7–23)
BUN SERPL-MCNC: 36 MG/DL — HIGH (ref 7–23)
CALCIUM SERPL-MCNC: 9.4 MG/DL — SIGNIFICANT CHANGE UP (ref 8.4–10.5)
CALCIUM SERPL-MCNC: 9.4 MG/DL — SIGNIFICANT CHANGE UP (ref 8.4–10.5)
CHLORIDE SERPL-SCNC: 106 MMOL/L — SIGNIFICANT CHANGE UP (ref 96–108)
CHLORIDE SERPL-SCNC: 107 MMOL/L — SIGNIFICANT CHANGE UP (ref 96–108)
CO2 SERPL-SCNC: 22 MMOL/L — SIGNIFICANT CHANGE UP (ref 22–31)
CO2 SERPL-SCNC: 25 MMOL/L — SIGNIFICANT CHANGE UP (ref 22–31)
CREAT SERPL-MCNC: 0.85 MG/DL — SIGNIFICANT CHANGE UP (ref 0.5–1.3)
CREAT SERPL-MCNC: 1 MG/DL — SIGNIFICANT CHANGE UP (ref 0.5–1.3)
GLUCOSE SERPL-MCNC: 112 MG/DL — HIGH (ref 70–99)
GLUCOSE SERPL-MCNC: 158 MG/DL — HIGH (ref 70–99)
HCT VFR BLD CALC: 35.8 % — LOW (ref 39–50)
HCT VFR BLD CALC: 36.5 % — LOW (ref 39–50)
HGB BLD-MCNC: 11.3 G/DL — LOW (ref 13–17)
HGB BLD-MCNC: 11.6 G/DL — LOW (ref 13–17)
INR BLD: 1.12 RATIO — SIGNIFICANT CHANGE UP (ref 0.88–1.16)
INR BLD: 1.14 RATIO — SIGNIFICANT CHANGE UP (ref 0.88–1.16)
MAGNESIUM SERPL-MCNC: 2.1 MG/DL — SIGNIFICANT CHANGE UP (ref 1.6–2.6)
MCHC RBC-ENTMCNC: 29.7 PG — SIGNIFICANT CHANGE UP (ref 27–34)
MCHC RBC-ENTMCNC: 29.9 PG — SIGNIFICANT CHANGE UP (ref 27–34)
MCHC RBC-ENTMCNC: 31.6 GM/DL — LOW (ref 32–36)
MCHC RBC-ENTMCNC: 31.8 GM/DL — LOW (ref 32–36)
MCV RBC AUTO: 94 FL — SIGNIFICANT CHANGE UP (ref 80–100)
MCV RBC AUTO: 94.1 FL — SIGNIFICANT CHANGE UP (ref 80–100)
NRBC # BLD: 0 /100 WBCS — SIGNIFICANT CHANGE UP (ref 0–0)
NRBC # BLD: 0 /100 WBCS — SIGNIFICANT CHANGE UP (ref 0–0)
PHOSPHATE SERPL-MCNC: 3.6 MG/DL — SIGNIFICANT CHANGE UP (ref 2.5–4.5)
PLATELET # BLD AUTO: 168 K/UL — SIGNIFICANT CHANGE UP (ref 150–400)
PLATELET # BLD AUTO: 171 K/UL — SIGNIFICANT CHANGE UP (ref 150–400)
POTASSIUM SERPL-MCNC: 4.1 MMOL/L — SIGNIFICANT CHANGE UP (ref 3.5–5.3)
POTASSIUM SERPL-MCNC: 4.2 MMOL/L — SIGNIFICANT CHANGE UP (ref 3.5–5.3)
POTASSIUM SERPL-SCNC: 4.1 MMOL/L — SIGNIFICANT CHANGE UP (ref 3.5–5.3)
POTASSIUM SERPL-SCNC: 4.2 MMOL/L — SIGNIFICANT CHANGE UP (ref 3.5–5.3)
PROTHROM AB SERPL-ACNC: 13 SEC — SIGNIFICANT CHANGE UP (ref 10.6–13.6)
PROTHROM AB SERPL-ACNC: 13.2 SEC — SIGNIFICANT CHANGE UP (ref 10.6–13.6)
RBC # BLD: 3.81 M/UL — LOW (ref 4.2–5.8)
RBC # BLD: 3.88 M/UL — LOW (ref 4.2–5.8)
RBC # FLD: 14.5 % — SIGNIFICANT CHANGE UP (ref 10.3–14.5)
RBC # FLD: 14.6 % — HIGH (ref 10.3–14.5)
RH IG SCN BLD-IMP: NEGATIVE — SIGNIFICANT CHANGE UP
RH IG SCN BLD-IMP: NEGATIVE — SIGNIFICANT CHANGE UP
SARS-COV-2 RNA SPEC QL NAA+PROBE: SIGNIFICANT CHANGE UP
SODIUM SERPL-SCNC: 140 MMOL/L — SIGNIFICANT CHANGE UP (ref 135–145)
SODIUM SERPL-SCNC: 141 MMOL/L — SIGNIFICANT CHANGE UP (ref 135–145)
WBC # BLD: 5.92 K/UL — SIGNIFICANT CHANGE UP (ref 3.8–10.5)
WBC # BLD: 6.3 K/UL — SIGNIFICANT CHANGE UP (ref 3.8–10.5)
WBC # FLD AUTO: 5.92 K/UL — SIGNIFICANT CHANGE UP (ref 3.8–10.5)
WBC # FLD AUTO: 6.3 K/UL — SIGNIFICANT CHANGE UP (ref 3.8–10.5)

## 2020-07-05 PROCEDURE — 93018 CV STRESS TEST I&R ONLY: CPT

## 2020-07-05 PROCEDURE — 78452 HT MUSCLE IMAGE SPECT MULT: CPT | Mod: 26

## 2020-07-05 PROCEDURE — 93016 CV STRESS TEST SUPVJ ONLY: CPT

## 2020-07-05 PROCEDURE — 99232 SBSQ HOSP IP/OBS MODERATE 35: CPT

## 2020-07-05 RX ORDER — PHENYLEPHRINE-SHARK LIVER OIL-MINERAL OIL-PETROLATUM RECTAL OINTMENT
1 OINTMENT (GRAM) RECTAL THREE TIMES A DAY
Refills: 0 | Status: DISCONTINUED | OUTPATIENT
Start: 2020-07-05 | End: 2020-07-06

## 2020-07-05 RX ORDER — SODIUM CHLORIDE 9 MG/ML
1000 INJECTION INTRAMUSCULAR; INTRAVENOUS; SUBCUTANEOUS
Refills: 0 | Status: DISCONTINUED | OUTPATIENT
Start: 2020-07-05 | End: 2020-07-06

## 2020-07-05 RX ADMIN — NYSTATIN CREAM 1 APPLICATION(S): 100000 CREAM TOPICAL at 05:13

## 2020-07-05 RX ADMIN — NYSTATIN CREAM 1 APPLICATION(S): 100000 CREAM TOPICAL at 17:58

## 2020-07-05 RX ADMIN — Medication 20 MILLIGRAM(S): at 05:13

## 2020-07-05 RX ADMIN — Medication 50 MILLIGRAM(S): at 05:13

## 2020-07-05 RX ADMIN — Medication 20 MILLIEQUIVALENT(S): at 12:42

## 2020-07-05 RX ADMIN — Medication 1 APPLICATION(S): at 17:57

## 2020-07-05 RX ADMIN — Medication 1 APPLICATION(S): at 05:13

## 2020-07-05 RX ADMIN — SIMVASTATIN 40 MILLIGRAM(S): 20 TABLET, FILM COATED ORAL at 22:27

## 2020-07-05 RX ADMIN — Medication 137 MICROGRAM(S): at 05:13

## 2020-07-05 RX ADMIN — Medication 81 MILLIGRAM(S): at 12:42

## 2020-07-05 RX ADMIN — ENOXAPARIN SODIUM 100 MILLIGRAM(S): 100 INJECTION SUBCUTANEOUS at 05:13

## 2020-07-05 NOTE — CHART NOTE - NSCHARTNOTEFT_GEN_A_CORE
Vascular Surgery Preop Note - Angiogram    Patient is a 75y old Male who presents with a chief complaint of right foot infection (01 Jul 2020 16:07)    Diagnosis: R foot cellulitis   Procedure: RLE angio  Surgeon: Dr. Sharma                          11.6   6.30  >-------< 171      ( 05 Jul 2020 16:14 )             36.5     07-05    141  |  106  |  36<H>  ----------------------------<  158<H>  4.1   |  25  |  1.00    Ca    9.4      05 Jul 2020 16:14  Phos  3.6     07-05  Mg     2.1     07-05      PT/INR - ( 05 Jul 2020 16:14 )   PT: 13.0 sec;   INR: 1.14 ratio         PTT - ( 05 Jul 2020 16:14 )  PTT:38.0 sec  ABO Interpretation: A (07-05 @ 16:14)  ABO Interpretation: A (07-05 @ 06:19)    [x] NPO after midnight  [x] IVF ordered  [x] T&S - ordered   [x] Pulse Exam       - Palpable R femoral pulse      - Palpable R popliteal pulse      - Palpable R DP pulse      - Palpable R PT pulse  [x] COVID - swabbed and ordered  [x] Consent - obtained, in chart     MEDICATIONS  (STANDING):  aspirin enteric coated 81 milliGRAM(s) Oral daily  clobetasol 0.05% Ointment 1 Application(s) Topical two times a day  enoxaparin Injectable 100 milliGRAM(s) SubCutaneous two times a day  furosemide    Tablet 20 milliGRAM(s) Oral daily  levothyroxine 137 MICROGram(s) Oral daily  metoprolol succinate ER 50 milliGRAM(s) Oral daily  nystatin Cream 1 Application(s) Topical two times a day  potassium chloride    Tablet ER 20 milliEquivalent(s) Oral daily  simvastatin 40 milliGRAM(s) Oral at bedtime  sodium chloride 0.9%. 1000 milliLiter(s) (125 mL/Hr) IV Continuous <Continuous>    MEDICATIONS  (PRN):  hydrOXYzine hydrochloride 25 milliGRAM(s) Oral two times a day PRN Itching      Assessment & Plan:  75y Male with CAD s/p stent on ASA, AF, hypothyroid, now with RLE cellulitis. Planned for RLE angio tomorrow.   - NPO after midnight  - IVF ordered after NPO at midnight      Vascular Surgery  [phone]

## 2020-07-05 NOTE — PROGRESS NOTE ADULT - SUBJECTIVE AND OBJECTIVE BOX
CARDIOLOGY     PROGRESS  NOTE   ________________________________________________    CHIEF COMPLAINT:Patient is a 75y old  Male who presents with a chief complaint of right foot infection (01 Jul 2020 16:07)  no complain.  	  REVIEW OF SYSTEMS:  CONSTITUTIONAL: No fever, weight loss, or fatigue  EYES: No eye pain, visual disturbances, or discharge  ENT:  No difficulty hearing, tinnitus, vertigo; No sinus or throat pain  NECK: No pain or stiffness  RESPIRATORY: No cough, wheezing, chills or hemoptysis; No Shortness of Breath  CARDIOVASCULAR: No chest pain, palpitations, passing out, dizziness, or leg swelling  GASTROINTESTINAL: No abdominal or epigastric pain. No nausea, vomiting, or hematemesis; No diarrhea or constipation. No melena or hematochezia.  GENITOURINARY: No dysuria, frequency, hematuria, or incontinence  NEUROLOGICAL: No headaches, memory loss, loss of strength, numbness, or tremors  SKIN: No itching, burning, rashes, or lesions   LYMPH Nodes: No enlarged glands  ENDOCRINE: No heat or cold intolerance; No hair loss  MUSCULOSKELETAL: No joint pain or swelling; No muscle, back, or extremity pain  PSYCHIATRIC: No depression, anxiety, mood swings, or difficulty sleeping  HEME/LYMPH: No easy bruising, or bleeding gums  ALLERGY AND IMMUNOLOGIC: No hives or eczema	    [ ] All others negative	  [ ] Unable to obtain    PHYSICAL EXAM:  T(C): 36.3 (07-05-20 @ 05:11), Max: 37 (07-04-20 @ 19:50)  HR: 63 (07-05-20 @ 05:11) (60 - 93)  BP: 149/74 (07-05-20 @ 05:11) (124/55 - 149/74)  RR: 20 (07-05-20 @ 05:11) (18 - 20)  SpO2: 96% (07-05-20 @ 05:11) (92% - 96%)  Wt(kg): --  I&O's Summary    04 Jul 2020 07:01  -  05 Jul 2020 07:00  --------------------------------------------------------  IN: 1580 mL / OUT: 2401 mL / NET: -821 mL        Appearance: Normal	  HEENT:   Normal oral mucosa, PERRL, EOMI	  Lymphatic: No lymphadenopathy  Cardiovascular: Normal S1 S2, No JVD, + murmurs, No edema  Respiratory: Lungs clear to auscultation	  Psychiatry: A & O x 3, Mood & affect appropriate  Gastrointestinal:  Soft, Non-tender, + BS	  Skin: No rashes, No ecchymoses, No cyanosis	  Neurologic: Non-focal  Extremities: Normal range of motion   Vascular: + pvd    MEDICATIONS  (STANDING):  aspirin enteric coated 81 milliGRAM(s) Oral daily  clobetasol 0.05% Ointment 1 Application(s) Topical two times a day  enoxaparin Injectable 100 milliGRAM(s) SubCutaneous two times a day  furosemide    Tablet 20 milliGRAM(s) Oral daily  levothyroxine 137 MICROGram(s) Oral daily  metoprolol succinate ER 50 milliGRAM(s) Oral daily  nystatin Cream 1 Application(s) Topical two times a day  potassium chloride    Tablet ER 20 milliEquivalent(s) Oral daily  simvastatin 40 milliGRAM(s) Oral at bedtime      TELEMETRY: 	    ECG:  	  RADIOLOGY:  OTHER: 	  	  LABS:	 	    CARDIAC MARKERS:                                11.3   5.92  )-----------( 168      ( 05 Jul 2020 06:35 )             35.8     07-05    140  |  107  |  31<H>  ----------------------------<  112<H>  4.2   |  22  |  0.85    Ca    9.4      05 Jul 2020 06:33      proBNP:   Lipid Profile:   HgA1c:   TSH: Thyroid Stimulating Hormone, Serum: 2.92 uIU/mL (06-30 @ 09:01)    PT/INR - ( 05 Jul 2020 08:28 )   PT: 13.2 sec;   INR: 1.12 ratio         PTT - ( 05 Jul 2020 08:28 )  PTT:39.5 sec      Assessment and plan  ---------------------------    75 year old male PMH PPM, CAD s/p stent on ASA, prior A-fib not on AC, hypothyroid, and total left knee replacement p/w R foot swelling and pain. Patient states this has been worsening for past 1 year. Seen in ER 6/10 and started on Augmentin x10 days without improvement. Visiting nurse has been bathing foot w/ vinegar and warm water but discharge and bleeding has been increasing.   pt with hx of sss, ?paf , s/p ppm, cad, who presented to ER with possible cellulitis with LE edema  asa daily  abx as per MEDICINE  ppm interrogated runs of wct, +intermittent a.fib short burse  ac if no contraindication  le arterial doppler/ PVD  increase  beta blocker as tolerated  WCT needs stress test, awaiting

## 2020-07-05 NOTE — PROGRESS NOTE ADULT - ASSESSMENT
75 year old male         PMH PPM, CAD s/p stent on ASA, A-fib, hypothyroid, and total left knee replacement      p/w R foot swelling and pain.     Patient states this has been worsening for past 1 year.  on  augmentin,  with  no  improvement   Patient also notes rash across the upper torso/ back  for past week, pruritic on R arm, and rash in the groin         Right foot cellulits affecting toes./  inter  digital  uklcers ,, was  on   IV Zosyn.     Podiatry   saw  pt/  no  intervention/  foot x-rays negative for OM.      Lasix ,   for bilateral leg edema. . ha s impproved   LE venous dopplers, no  dvt      ASA   prior stent in 2007./   AFIB, PPM   h/o  AFIB,    no  a/c//  not  sure  why ?/    card  eval  Anemia,  gi  eval dr joann sanchez   interrogate  PPM/ if  pt  has  afib, as stored   event, , then  will need  to start  a/c/  pt  made  aware   of  risks  out  pt  card is  dr mikayla dick     echo,  normal  Ef  PPM  interrogation  with  WCT/   bursts  of  afib/   on lovenox  bid    stress  test   for  WCT  derm eval   noted,   dermatitis/ on   atarax/  lidex  cream  angio on Monday ,  per  vascular  awaiting stress  test,  still

## 2020-07-05 NOTE — PROGRESS NOTE ADULT - SUBJECTIVE AND OBJECTIVE BOX
HPI:  74yo Male with Hx of __, now POD_ from     24h Events:   - Overnight, no acute events    Subjective:   Patient examined at bedside this AM. Reports     Objective:  Vital Signs  T(C): 36.4 (07-05 @ 13:41), Max: 37 (07-04 @ 19:50)  HR: 60 (07-05 @ 13:41) (60 - 93)  BP: 125/72 (07-05 @ 13:41) (124/55 - 149/74)  RR: 19 (07-05 @ 13:41) (19 - 20)  SpO2: 95% (07-05 @ 13:41) (92% - 96%)  07-04-20 @ 07:01  -  07-05-20 @ 07:00  --------------------------------------------------------  IN: 1580 mL / OUT: 2401 mL / NET: -821 mL        Physical Exam:  General: alert and oriented, NAD  Resp: airway patent, respirations unlabored  CVS: regular rate and rhythm  Abdomen: soft, nontender, nondistended  Extremities: no edema  Skin: warm, dry, appropriate color                        11.3   5.92  )-----------( 168      ( 05 Jul 2020 06:35 )             35.8   07-05    140  |  107  |  31<H>  ----------------------------<  112<H>  4.2   |  22  |  0.85    Ca    9.4      05 Jul 2020 06:33      CAPILLARY BLOOD GLUCOSE          Assessment:   74yo Male     Plan:       Vascular Surgery  [id number] HPI:  76yo Male with Hx of PPM, CAD (s/p stent on ASA), AF, hypothyroidism who presents with R foot swelling and pain. Vascular surgery is consulted for RLE angio. Planned for angio Monday.    24h Events:   - Overnight, no acute events  - Patient to receive stress test today  - Pre-op for OR     Subjective:   Patient examined at bedside this AM. Reports that he is feeling well.     Objective:  Vital Signs  T(C): 36.4 (07-05 @ 13:41), Max: 37 (07-04 @ 19:50)  HR: 60 (07-05 @ 13:41) (60 - 93)  BP: 125/72 (07-05 @ 13:41) (124/55 - 149/74)  RR: 19 (07-05 @ 13:41) (19 - 20)  SpO2: 95% (07-05 @ 13:41) (92% - 96%)  07-04-20 @ 07:01  -  07-05-20 @ 07:00  --------------------------------------------------------  IN: 1580 mL / OUT: 2401 mL / NET: -821 mL      Physical Exam:  General: alert and oriented, NAD  Resp: airway patent, respirations unlabored  R foot -- dressing c/d/i                        11.3   5.92  )-----------( 168      ( 05 Jul 2020 06:35 )             35.8   07-05    140  |  107  |  31<H>  ----------------------------<  112<H>  4.2   |  22  |  0.85    Ca    9.4      05 Jul 2020 06:33        Assessment:   76yo Male, planned for RLE angio in OR tomorrow.     Plan:   - F/u stress test results  - Pre-op for RLE angio scheduled for tomorrow      Vascular Surgery  [id number] HPI:  74yo Male with Hx of PPM, CAD (s/p stent on ASA), AF, hypothyroidism who presents with R foot swelling and pain. Vascular surgery is consulted for RLE angio. Planned for angio Monday.    24h Events:   - Overnight, no acute events  - Patient to receive stress test today  - Pre-op for OR     Subjective:   Patient examined at bedside this AM. Reports that he is feeling well.     Objective:  Vital Signs  T(C): 36.4 (07-05 @ 13:41), Max: 37 (07-04 @ 19:50)  HR: 60 (07-05 @ 13:41) (60 - 93)  BP: 125/72 (07-05 @ 13:41) (124/55 - 149/74)  RR: 19 (07-05 @ 13:41) (19 - 20)  SpO2: 95% (07-05 @ 13:41) (92% - 96%)  07-04-20 @ 07:01  -  07-05-20 @ 07:00  --------------------------------------------------------  IN: 1580 mL / OUT: 2401 mL / NET: -821 mL      Physical Exam:  General: alert and oriented, NAD  Resp: airway patent, respirations unlabored  R foot -- dressing c/d/i                        11.3   5.92  )-----------( 168      ( 05 Jul 2020 06:35 )             35.8   07-05    140  |  107  |  31<H>  ----------------------------<  112<H>  4.2   |  22  |  0.85    Ca    9.4      05 Jul 2020 06:33        Assessment:   74yo Male, planned for RLE angio in OR tomorrow.     Plan:   - F/u stress test results  - Pre-op for RLE angio scheduled for tomorrow  - NPO after midnight for OR tomorrow      Vascular Surgery  [id number]

## 2020-07-05 NOTE — PROGRESS NOTE ADULT - SUBJECTIVE AND OBJECTIVE BOX
no  complaingts  REVIEW OF SYSTEMS:  GEN: no fever,    no chills  RESP: no SOB,   no cough  CVS: no chest pain,   no palpitations  GI: no abdominal pain,   no nausea,   no vomiting,   no constipation,   no diarrhea  : no dysuria,   no frequency  NEURO: no headache,   no dizziness  PSYCH: no depression,   not anxious  Derm : no rash    MEDICATIONS  (STANDING):  aspirin enteric coated 81 milliGRAM(s) Oral daily  clobetasol 0.05% Ointment 1 Application(s) Topical two times a day  enoxaparin Injectable 100 milliGRAM(s) SubCutaneous two times a day  furosemide    Tablet 20 milliGRAM(s) Oral daily  levothyroxine 137 MICROGram(s) Oral daily  metoprolol succinate ER 50 milliGRAM(s) Oral daily  nystatin Cream 1 Application(s) Topical two times a day  potassium chloride    Tablet ER 20 milliEquivalent(s) Oral daily  simvastatin 40 milliGRAM(s) Oral at bedtime    MEDICATIONS  (PRN):  hydrOXYzine hydrochloride 25 milliGRAM(s) Oral two times a day PRN Itching      Vital Signs Last 24 Hrs  T(C): 36.3 (05 Jul 2020 05:11), Max: 37 (04 Jul 2020 19:50)  T(F): 97.3 (05 Jul 2020 05:11), Max: 98.6 (04 Jul 2020 19:50)  HR: 63 (05 Jul 2020 05:11) (60 - 93)  BP: 149/74 (05 Jul 2020 05:11) (124/55 - 149/74)  BP(mean): --  RR: 20 (05 Jul 2020 05:11) (18 - 20)  SpO2: 96% (05 Jul 2020 05:11) (92% - 96%)  CAPILLARY BLOOD GLUCOSE        I&O's Summary    03 Jul 2020 07:01  -  04 Jul 2020 07:00  --------------------------------------------------------  IN: 930 mL / OUT: 2120 mL / NET: -1190 mL    04 Jul 2020 07:01  -  05 Jul 2020 05:58  --------------------------------------------------------  IN: 1580 mL / OUT: 2401 mL / NET: -821 mL        PHYSICAL EXAM:  HEAD:  Atraumatic, Normocephalic  NECK: Supple, No   JVD  CHEST/LUNG:   no     rales,     no,    rhonchi  HEART: Regular rate and rhythm;         murmur  ABDOMEN: Soft, Nontender, ;   EXTREMITIES:       c/c  ulcers foot  NEUROLOGY:  alert    LABS:                          Thyroid Stimulating Hormone, Serum: 2.92 uIU/mL (06-30 @ 09:01)          Consultant(s) Notes Reviewed:      Care Discussed with Consultants/Other Providers:

## 2020-07-06 LAB
ANION GAP SERPL CALC-SCNC: 8 MMOL/L — SIGNIFICANT CHANGE UP (ref 5–17)
APTT BLD: 40.5 SEC — HIGH (ref 27.5–35.5)
BUN SERPL-MCNC: 28 MG/DL — HIGH (ref 7–23)
CALCIUM SERPL-MCNC: 9.5 MG/DL — SIGNIFICANT CHANGE UP (ref 8.4–10.5)
CHLORIDE SERPL-SCNC: 106 MMOL/L — SIGNIFICANT CHANGE UP (ref 96–108)
CO2 SERPL-SCNC: 26 MMOL/L — SIGNIFICANT CHANGE UP (ref 22–31)
CREAT SERPL-MCNC: 0.79 MG/DL — SIGNIFICANT CHANGE UP (ref 0.5–1.3)
GLUCOSE SERPL-MCNC: 111 MG/DL — HIGH (ref 70–99)
HCT VFR BLD CALC: 36.4 % — LOW (ref 39–50)
HGB BLD-MCNC: 11.5 G/DL — LOW (ref 13–17)
INR BLD: 1.14 RATIO — SIGNIFICANT CHANGE UP (ref 0.88–1.16)
MAGNESIUM SERPL-MCNC: 2 MG/DL — SIGNIFICANT CHANGE UP (ref 1.6–2.6)
MCHC RBC-ENTMCNC: 29.5 PG — SIGNIFICANT CHANGE UP (ref 27–34)
MCHC RBC-ENTMCNC: 31.6 GM/DL — LOW (ref 32–36)
MCV RBC AUTO: 93.3 FL — SIGNIFICANT CHANGE UP (ref 80–100)
NRBC # BLD: 0 /100 WBCS — SIGNIFICANT CHANGE UP (ref 0–0)
PHOSPHATE SERPL-MCNC: 3.1 MG/DL — SIGNIFICANT CHANGE UP (ref 2.5–4.5)
PLATELET # BLD AUTO: 160 K/UL — SIGNIFICANT CHANGE UP (ref 150–400)
POTASSIUM SERPL-MCNC: 4.4 MMOL/L — SIGNIFICANT CHANGE UP (ref 3.5–5.3)
POTASSIUM SERPL-SCNC: 4.4 MMOL/L — SIGNIFICANT CHANGE UP (ref 3.5–5.3)
PROTHROM AB SERPL-ACNC: 13 SEC — SIGNIFICANT CHANGE UP (ref 10.6–13.6)
RBC # BLD: 3.9 M/UL — LOW (ref 4.2–5.8)
RBC # FLD: 14.5 % — SIGNIFICANT CHANGE UP (ref 10.3–14.5)
SODIUM SERPL-SCNC: 140 MMOL/L — SIGNIFICANT CHANGE UP (ref 135–145)
WBC # BLD: 5.97 K/UL — SIGNIFICANT CHANGE UP (ref 3.8–10.5)
WBC # FLD AUTO: 5.97 K/UL — SIGNIFICANT CHANGE UP (ref 3.8–10.5)

## 2020-07-06 PROCEDURE — 99232 SBSQ HOSP IP/OBS MODERATE 35: CPT

## 2020-07-06 PROCEDURE — 36245 INS CATH ABD/L-EXT ART 1ST: CPT | Mod: RT

## 2020-07-06 PROCEDURE — 75625 CONTRAST EXAM ABDOMINL AORTA: CPT | Mod: 26

## 2020-07-06 PROCEDURE — 75710 ARTERY X-RAYS ARM/LEG: CPT | Mod: 26,RT

## 2020-07-06 RX ORDER — HYDROXYZINE HCL 10 MG
25 TABLET ORAL
Refills: 0 | Status: DISCONTINUED | OUTPATIENT
Start: 2020-07-06 | End: 2020-07-08

## 2020-07-06 RX ORDER — LEVOTHYROXINE SODIUM 125 MCG
137 TABLET ORAL DAILY
Refills: 0 | Status: DISCONTINUED | OUTPATIENT
Start: 2020-07-06 | End: 2020-07-08

## 2020-07-06 RX ORDER — ONDANSETRON 8 MG/1
4 TABLET, FILM COATED ORAL ONCE
Refills: 0 | Status: DISCONTINUED | OUTPATIENT
Start: 2020-07-06 | End: 2020-07-07

## 2020-07-06 RX ORDER — METOPROLOL TARTRATE 50 MG
50 TABLET ORAL DAILY
Refills: 0 | Status: DISCONTINUED | OUTPATIENT
Start: 2020-07-06 | End: 2020-07-08

## 2020-07-06 RX ORDER — PHENYLEPHRINE-SHARK LIVER OIL-MINERAL OIL-PETROLATUM RECTAL OINTMENT
1 OINTMENT (GRAM) RECTAL THREE TIMES A DAY
Refills: 0 | Status: DISCONTINUED | OUTPATIENT
Start: 2020-07-06 | End: 2020-07-08

## 2020-07-06 RX ORDER — FUROSEMIDE 40 MG
20 TABLET ORAL DAILY
Refills: 0 | Status: DISCONTINUED | OUTPATIENT
Start: 2020-07-06 | End: 2020-07-08

## 2020-07-06 RX ORDER — ONDANSETRON 8 MG/1
4 TABLET, FILM COATED ORAL ONCE
Refills: 0 | Status: DISCONTINUED | OUTPATIENT
Start: 2020-07-06 | End: 2020-07-06

## 2020-07-06 RX ORDER — ASPIRIN/CALCIUM CARB/MAGNESIUM 324 MG
81 TABLET ORAL DAILY
Refills: 0 | Status: DISCONTINUED | OUTPATIENT
Start: 2020-07-06 | End: 2020-07-08

## 2020-07-06 RX ORDER — SIMVASTATIN 20 MG/1
40 TABLET, FILM COATED ORAL AT BEDTIME
Refills: 0 | Status: DISCONTINUED | OUTPATIENT
Start: 2020-07-06 | End: 2020-07-08

## 2020-07-06 RX ORDER — HYDROMORPHONE HYDROCHLORIDE 2 MG/ML
0.25 INJECTION INTRAMUSCULAR; INTRAVENOUS; SUBCUTANEOUS
Refills: 0 | Status: DISCONTINUED | OUTPATIENT
Start: 2020-07-06 | End: 2020-07-06

## 2020-07-06 RX ORDER — POTASSIUM CHLORIDE 20 MEQ
20 PACKET (EA) ORAL DAILY
Refills: 0 | Status: DISCONTINUED | OUTPATIENT
Start: 2020-07-06 | End: 2020-07-08

## 2020-07-06 RX ORDER — HYDROMORPHONE HYDROCHLORIDE 2 MG/ML
0.25 INJECTION INTRAMUSCULAR; INTRAVENOUS; SUBCUTANEOUS
Refills: 0 | Status: DISCONTINUED | OUTPATIENT
Start: 2020-07-06 | End: 2020-07-07

## 2020-07-06 RX ADMIN — Medication 1 APPLICATION(S): at 06:12

## 2020-07-06 RX ADMIN — Medication 1 APPLICATION(S): at 22:05

## 2020-07-06 RX ADMIN — Medication 137 MICROGRAM(S): at 06:15

## 2020-07-06 RX ADMIN — ENOXAPARIN SODIUM 100 MILLIGRAM(S): 100 INJECTION SUBCUTANEOUS at 06:12

## 2020-07-06 RX ADMIN — SODIUM CHLORIDE 125 MILLILITER(S): 9 INJECTION INTRAMUSCULAR; INTRAVENOUS; SUBCUTANEOUS at 12:33

## 2020-07-06 RX ADMIN — SODIUM CHLORIDE 125 MILLILITER(S): 9 INJECTION INTRAMUSCULAR; INTRAVENOUS; SUBCUTANEOUS at 00:02

## 2020-07-06 RX ADMIN — Medication 50 MILLIGRAM(S): at 06:12

## 2020-07-06 RX ADMIN — Medication 20 MILLIGRAM(S): at 06:12

## 2020-07-06 RX ADMIN — Medication 81 MILLIGRAM(S): at 16:09

## 2020-07-06 RX ADMIN — SIMVASTATIN 40 MILLIGRAM(S): 20 TABLET, FILM COATED ORAL at 22:05

## 2020-07-06 NOTE — BRIEF OPERATIVE NOTE - OPERATION/FINDINGS
co-dominance of PT and AT arteries into foot (60% AT, 40% PT)   small vessel disease in calf  venous insufficiency in calf

## 2020-07-06 NOTE — PRE-OP CHECKLIST - TO WHOM
or
Admit to telemetry.   Orthostatic every 24 hours.Trend CE.    Previous TTE from May 2017 reviewed.   check cbc,tsh,lipid, hemoglobin a1c, bmp with mag and phos.   f/u MD note
OR RN
Admit to telemetry.   Orthostatic every 24 hours.Trend CE.    Previous TTE from May 2017 reviewed.   check cbc,tsh,lipid, hemoglobin a1c, bmp with mag and phos.   cath recently unremarkable  likely vagal induced  will get neuro eval

## 2020-07-06 NOTE — PROGRESS NOTE ADULT - SUBJECTIVE AND OBJECTIVE BOX
CARDIOLOGY     PROGRESS  NOTE   ________________________________________________    CHIEF COMPLAINT:Patient is a 75y old  Male who presents with a chief complaint of right foot infection (01 Jul 2020 16:07)  no complain.  	  REVIEW OF SYSTEMS:  CONSTITUTIONAL: No fever, weight loss, or fatigue  EYES: No eye pain, visual disturbances, or discharge  ENT:  No difficulty hearing, tinnitus, vertigo; No sinus or throat pain  NECK: No pain or stiffness  RESPIRATORY: No cough, wheezing, chills or hemoptysis; No Shortness of Breath  CARDIOVASCULAR: No chest pain, palpitations, passing out, dizziness, or leg swelling  GASTROINTESTINAL: No abdominal or epigastric pain. No nausea, vomiting, or hematemesis; No diarrhea or constipation. No melena or hematochezia.  GENITOURINARY: No dysuria, frequency, hematuria, or incontinence  NEUROLOGICAL: No headaches, memory loss, loss of strength, numbness, or tremors  SKIN: No itching, burning, rashes, or lesions   LYMPH Nodes: No enlarged glands  ENDOCRINE: No heat or cold intolerance; No hair loss  MUSCULOSKELETAL: No joint pain or swelling; No muscle, back, or extremity pain  PSYCHIATRIC: No depression, anxiety, mood swings, or difficulty sleeping  HEME/LYMPH: No easy bruising, or bleeding gums  ALLERGY AND IMMUNOLOGIC: No hives or eczema	    [ ] All others negative	  [ ] Unable to obtain    PHYSICAL EXAM:  T(C): 36.5 (07-06-20 @ 04:52), Max: 36.8 (07-05-20 @ 19:42)  HR: 60 (07-06-20 @ 04:52) (60 - 64)  BP: 149/73 (07-06-20 @ 04:52) (125/72 - 149/73)  RR: 17 (07-06-20 @ 04:52) (17 - 19)  SpO2: 95% (07-06-20 @ 04:52) (95% - 96%)  Wt(kg): --  I&O's Summary    05 Jul 2020 07:01  -  06 Jul 2020 07:00  --------------------------------------------------------  IN: 1735 mL / OUT: 1075 mL / NET: 660 mL        Appearance: Normal	  HEENT:   Normal oral mucosa, PERRL, EOMI	  Lymphatic: No lymphadenopathy  Cardiovascular: Normal S1 S2, No JVD, No murmurs, No edema  Respiratory: Lungs clear to auscultation	  Psychiatry: A & O x 3, Mood & affect appropriate  Gastrointestinal:  Soft, Non-tender, + BS	  Skin: No rashes, No ecchymoses, No cyanosis	  Neurologic: Non-focal  Extremities: Normal range of motion, No clubbing, cyanosis or edema  Vascular: + pvd/ foot ulcer    MEDICATIONS  (STANDING):  aspirin enteric coated 81 milliGRAM(s) Oral daily  clobetasol 0.05% Ointment 1 Application(s) Topical two times a day  enoxaparin Injectable 100 milliGRAM(s) SubCutaneous two times a day  furosemide    Tablet 20 milliGRAM(s) Oral daily  levothyroxine 137 MICROGram(s) Oral daily  metoprolol succinate ER 50 milliGRAM(s) Oral daily  nystatin Cream 1 Application(s) Topical two times a day  potassium chloride    Tablet ER 20 milliEquivalent(s) Oral daily  simvastatin 40 milliGRAM(s) Oral at bedtime  sodium chloride 0.9%. 1000 milliLiter(s) (125 mL/Hr) IV Continuous <Continuous>      TELEMETRY: 	    ECG:  	  RADIOLOGY:  OTHER: 	  	  LABS:	 	    CARDIAC MARKERS:                                11.6   6.30  )-----------( 171      ( 05 Jul 2020 16:14 )             36.5     07-05    141  |  106  |  36<H>  ----------------------------<  158<H>  4.1   |  25  |  1.00    Ca    9.4      05 Jul 2020 16:14  Phos  3.6     07-05  Mg     2.1     07-05      proBNP:   Lipid Profile:   HgA1c:   TSH: Thyroid Stimulating Hormone, Serum: 2.92 uIU/mL (06-30 @ 09:01)    PT/INR - ( 05 Jul 2020 16:14 )   PT: 13.0 sec;   INR: 1.14 ratio         PTT - ( 05 Jul 2020 16:14 )  PTT:38.0 sec  < from: Transthoracic Echocardiogram (06.30.20 @ 15:42) >  1. Mitral annular calcification, otherwise normal mitral  valve. Moderate mitral regurgitation.Eccentric anteriorly  directed jet.  2. Calcified trileaflet aortic valve with normal opening.  Peak transaortic valve gradient equals 15 mm Hg, aortic  valve velocity time integral equals 38 cm, estimated aortic  valve area equals 3.1 sqcm. Mild aortic regurgitation.  3. Severely dilated left atrium.  LA volume index = 49  cc/m2.  4. Eccentric left ventricular hypertrophy (dilated left  ventricle with normal relative wall thickness).  5. Endocardium not well visualized; grossly normal left  ventricular systolic function. Regional wall motion could  not be accurately assessed.  6. The right ventricle is not well visualized; grossly  normal right ventricular systolic function. TV s' = 21  cm/sec.  7. Estimated right ventricular systolic pressure equals 36  mm Hg, assuming right atrial pressure equals 8 mm Hg,  consistent with borderline pulmonary hypertension.      < from: Nuclear Stress Test-Pharmacologic (07.05.20 @ 12:31) >  * Symptom: Shortness of breath and stomach discomfort  resolved spontaneously.  * HR Response: Appropriate.  * BP Response: Appropriate.  * Heart Rhythm: Atrial Paced Rhythm - 60 BPM with  prolonged MO interval.  * Baseline ECG: Nonspecific ST-T wave abnormality.  * T wave abnormality in III, inverted.  * ECG Abnormalities: No significant changes from baseline  ECG.  * Arrhythmia: Occasional APDs occurred during stress.  Occasional VPDs occurred during stress and recovery.  * Review of raw data shows: Diaphragmatic artifact., Minor  motion artifact.  * The left ventricle was enlarged. There are mild defects  in basal to mid anterior walls that are reversible,  suggestive of ischemia.  * There are large, moderate defects in apical, basal  inferolateral, basal septal, inferior walls that are  fixed, with normal wall motion suggestive of diaphragmatic  attenuation artifact.  * Patient could not perform prone imaging.  * Post-stress gated wall motion analysis was performed  (LVEF = 55 %;LVEDV = 216 ml.) revealing left ventricular  enlargement with normal left ventricular systolic  function.    < end of copied text >      Assessment and plan  ---------------------------  75 year old male PMH PPM, CAD s/p stent on ASA, prior A-fib not on AC, hypothyroid, and total left knee replacement p/w R foot swelling and pain. Patient states this has been worsening for past 1 year. Seen in ER 6/10 and started on Augmentin x10 days without improvement. Visiting nurse has been bathing foot w/ vinegar and warm water but discharge and bleeding has been increasing.   pt with hx of sss, ?paf , s/p ppm, cad, who presented to ER with possible cellulitis with LE edema  asa daily  abx as per MEDICINE  ppm interrogated runs of wct, +intermittent a.fib short burse  ac if no contraindication  le arterial doppler/ PVD  increase  beta blocker as tolerated  echo with normal ef, stress test ?mild ischemia  pt is clear cardiac wise for the procedure with MOD risk  continue beta  blocker  add ace inhibitor as bp elevated

## 2020-07-06 NOTE — PROGRESS NOTE ADULT - ASSESSMENT
75 year old male         PMH PPM, CAD s/p stent on ASA, A-fib, hypothyroid, and total left knee replacement      p/w R foot swelling and pain.     Patient states this has been worsening for past 1 year.  on  augmentin,  with  no  improvement   Patient also notes rash across the upper torso/ back  for past week, pruritic on R arm, and rash in the groin         Right foot cellulits affecting toes./  inter  digital  uklcers ,, was  on   IV Zosyn.     Podiatry   saw  pt/  no  intervention/  foot x-rays negative for OM.      Lasix ,   for bilateral leg edema. . ha s impproved   LE venous dopplers, no  dvt      ASA   prior stent in 2007./   AFIB, PPM   h/o  AFIB,    no  a/c//  not  sure  why ?/    card  eval  Anemia,  gi  eval dr joann sanchez   interrogate  PPM/ if  pt  has  afib, as stored   event, , then  will need  to start  a/c/  pt  made  aware   of  risks  out  pt  card is  dr mikayla dick     echo,  normal  Ef  PPM  interrogation  with  WCT/   bursts  of  afib/   on lovenox  bid    stress  test   for  WCT  derm eval   noted,   dermatitis/ on   atarax/  lidex  cream  angio on Monday ,  per  vascular  any  stress test/  cleraed for  procedure

## 2020-07-06 NOTE — PROGRESS NOTE ADULT - ASSESSMENT
ASSESSMENT:   74 y/o male with chronic non-resolving interdigital tenia infection of the right lower extremity. Concern for venous insufficiency and arterial insufficiency w small vessel arterial disease. Underwent stress test yesterday in anticipation for angiogram today.     Plan:  - Right lower extremity angiogram today  - Cards to review stress test results prior to angiogram   - Pre-procedural labs appreciated  - Continue woundcare   - Appreciate medicine clearance for angio   - Consent signed and in chart   - C/w care per primary team     Please contact Vascular Surgery (p. [phone]) with any questions.     Ying Dao, PGY2  Surgery, Vascular Team   Pager [phone]  [facility] ASSESSMENT:   74 y/o male with chronic non-resolving interdigital tenia infection of the right lower extremity. Concern for venous insufficiency and arterial insufficiency w small vessel arterial disease. Underwent stress test yesterday in anticipation for angiogram today.     Plan:  - Right lower extremity angiogram today  - Cardiology input appreciated: patient is cleared for procedure w mod risk from cardiac standpoint  - Pre-procedural labs appreciated  - Continue woundcare   - Appreciate medicine clearance for angio   - Consent signed and in chart   - C/w care per primary team     Please contact Vascular Surgery (p. [phone]) with any questions.     Ying Dao, PGY2  Surgery, Vascular Team   Pager [phone]  [facility]

## 2020-07-06 NOTE — PROGRESS NOTE ADULT - SUBJECTIVE AND OBJECTIVE BOX
VASCULAR SURGERY PROGRESS NOTE    75yMale    SUBJECTIVE:  Patient seen and examined at bedside. Underwent stress test yesterday.   No change to RLE, having mild discomfort.     --------------------------------------------------------------------------------------------------  OBJECTIVE:     Vital Signs:  Vital Signs Last 24 Hrs  T(C): 36.5 (06 Jul 2020 04:52), Max: 36.8 (05 Jul 2020 19:42)  T(F): 97.7 (06 Jul 2020 04:52), Max: 98.2 (05 Jul 2020 19:42)  HR: 60 (06 Jul 2020 04:52) (60 - 64)  BP: 149/73 (06 Jul 2020 04:52) (125/72 - 149/73)  BP(mean): --  RR: 17 (06 Jul 2020 04:52) (17 - 19)  SpO2: 95% (06 Jul 2020 04:52) (95% - 96%)    --------------------------------------------------------------------------------------------------  Inputs/Outputs:    05 Jul 2020 07:01  -  06 Jul 2020 07:00  --------------------------------------------------------  IN:    Oral Fluid: 860 mL    sodium chloride 0.9%.: 875 mL  Total IN: 1735 mL    OUT:    Voided: 1075 mL  Total OUT: 1075 mL    Total NET: 660 mL      06 Jul 2020 07:01 - 06 Jul 2020 08:22  --------------------------------------------------------  IN:  Total IN: 0 mL    OUT:    Voided: 400 mL  Total OUT: 400 mL    Total NET: -400 mL        --------------------------------------------------------------------------------------------------  Laboratories:                        11.6   6.30  )-----------( 171      ( 05 Jul 2020 16:14 )             36.5         05 Jul 2020 16:14    141    |  106    |  36     ----------------------------<  158    4.1     |  25     |  1.00     Ca    9.4        05 Jul 2020 16:14  Phos  3.6       05 Jul 2020 16:14  Mg     2.1       05 Jul 2020 16:14      PT/INR - ( 05 Jul 2020 16:14 )   PT: 13.0 sec;   INR: 1.14 ratio         PTT - ( 05 Jul 2020 16:14 )  PTT:38.0 sec    --------------------------------------------------------------------------------------------------  Physical Exam:  General: AAOx3, NAD, resting comfortably   HEENT: NC/AT  Respiratory: no increased work of breathing   Abdomen: soft, nontender, nondistended  Extremities: warm and well perfused  PT/DP pulses palpable in both lower extremities. Sensation intact. Right interdigit infection  limited  web space wound healing remains  --------------------------------------------------------------------------------------------------  Medications:  MEDICATIONS  (STANDING):  aspirin enteric coated 81 milliGRAM(s) Oral daily  clobetasol 0.05% Ointment 1 Application(s) Topical two times a day  furosemide    Tablet 20 milliGRAM(s) Oral daily  levothyroxine 137 MICROGram(s) Oral daily  metoprolol succinate ER 50 milliGRAM(s) Oral daily  nystatin Cream 1 Application(s) Topical two times a day  potassium chloride    Tablet ER 20 milliEquivalent(s) Oral daily  simvastatin 40 milliGRAM(s) Oral at bedtime  sodium chloride 0.9%. 1000 milliLiter(s) (125 mL/Hr) IV Continuous <Continuous>    MEDICATIONS  (PRN):  hemorrhoidal Ointment 1 Application(s) Rectal three times a day PRN Itching/Burning.  hydrOXYzine hydrochloride 25 milliGRAM(s) Oral two times a day PRN Itching

## 2020-07-06 NOTE — PRE-ANESTHESIA EVALUATION ADULT - NSANTHPEFT_GEN_ALL_CORE
General: well appearing, appears stated age  Cardiovascular: RRR, no murmurs appreciated  Respiratory: CTA B/L  Neuro: left eye outwards and pupil enlarged

## 2020-07-06 NOTE — PRE-OP CHECKLIST - 1.
Emotional support and pre-op teaching provided to patient.
emotional support and preop teaching provided to pt

## 2020-07-06 NOTE — PRE-ANESTHESIA EVALUATION ADULT - NSANTHPMHFT_GEN_ALL_CORE
75M PMH PPM, CAD s/p stent 2007 on ASA, prior A-fib not on AC, hypothyroid, and total left knee replacement p/w R foot swelling and pain.  Asbestosis 75M PMH PPM, CAD s/p stent 2007 on ASA, prior A-fib not on AC, hypothyroid, and total left knee replacement p/w R foot swelling and pain.  Asbestosis.    Discussed with EP physician Dr. Christensen, PPM likely with > 1 year battery life.

## 2020-07-06 NOTE — PROGRESS NOTE ADULT - SUBJECTIVE AND OBJECTIVE BOX
no complaints    REVIEW OF SYSTEMS:  GEN: no fever,    no chills  RESP: no SOB,   no cough  CVS: no chest pain,   no palpitations  GI: no abdominal pain,   no nausea,   no vomiting,   no constipation,   no diarrhea  : no dysuria,   no frequency  NEURO: no headache,   no dizziness  PSYCH: no depression,   not anxious  Derm : no rash    MEDICATIONS  (STANDING):  aspirin enteric coated 81 milliGRAM(s) Oral daily  clobetasol 0.05% Ointment 1 Application(s) Topical two times a day  enoxaparin Injectable 100 milliGRAM(s) SubCutaneous two times a day  furosemide    Tablet 20 milliGRAM(s) Oral daily  levothyroxine 137 MICROGram(s) Oral daily  metoprolol succinate ER 50 milliGRAM(s) Oral daily  nystatin Cream 1 Application(s) Topical two times a day  potassium chloride    Tablet ER 20 milliEquivalent(s) Oral daily  simvastatin 40 milliGRAM(s) Oral at bedtime  sodium chloride 0.9%. 1000 milliLiter(s) (125 mL/Hr) IV Continuous <Continuous>    MEDICATIONS  (PRN):  hemorrhoidal Ointment 1 Application(s) Rectal three times a day PRN Itching/Burning.  hydrOXYzine hydrochloride 25 milliGRAM(s) Oral two times a day PRN Itching      Vital Signs Last 24 Hrs  T(C): 36.5 (06 Jul 2020 04:52), Max: 36.8 (05 Jul 2020 19:42)  T(F): 97.7 (06 Jul 2020 04:52), Max: 98.2 (05 Jul 2020 19:42)  HR: 60 (06 Jul 2020 04:52) (60 - 64)  BP: 149/73 (06 Jul 2020 04:52) (125/72 - 149/73)  BP(mean): --  RR: 17 (06 Jul 2020 04:52) (17 - 19)  SpO2: 95% (06 Jul 2020 04:52) (95% - 96%)  CAPILLARY BLOOD GLUCOSE        I&O's Summary    05 Jul 2020 07:01  -  06 Jul 2020 07:00  --------------------------------------------------------  IN: 1735 mL / OUT: 1075 mL / NET: 660 mL        PHYSICAL EXAM:  HEAD:  Atraumatic, Normocephalic  NECK: Supple, No   JVD  CHEST/LUNG:   no     rales,     no,    rhonchi  HEART: Regular rate and rhythm;         murmur  ABDOMEN: Soft, Nontender, ;   EXTREMITIES:         c/c ulcers  foot  NEUROLOGY:  alert    LABS:                        11.6   6.30  )-----------( 171      ( 05 Jul 2020 16:14 )             36.5     07-05    141  |  106  |  36<H>  ----------------------------<  158<H>  4.1   |  25  |  1.00    Ca    9.4      05 Jul 2020 16:14  Phos  3.6     07-05  Mg     2.1     07-05      PT/INR - ( 05 Jul 2020 16:14 )   PT: 13.0 sec;   INR: 1.14 ratio         PTT - ( 05 Jul 2020 16:14 )  PTT:38.0 sec                Thyroid Stimulating Hormone, Serum: 2.92 uIU/mL (06-30 @ 09:01)          Consultant(s) Notes Reviewed:      Care Discussed with Consultants/Other Providers:

## 2020-07-06 NOTE — PRE-OP CHECKLIST - COMMENTS
ppm interrogation on chart battery life =2.81v, dr saenz (cardiology) on phone with dr Keller (anesthesia) states battery life is greater than 6 months.

## 2020-07-07 LAB
ANION GAP SERPL CALC-SCNC: 10 MMOL/L — SIGNIFICANT CHANGE UP (ref 5–17)
BUN SERPL-MCNC: 23 MG/DL — SIGNIFICANT CHANGE UP (ref 7–23)
CALCIUM SERPL-MCNC: 9.6 MG/DL — SIGNIFICANT CHANGE UP (ref 8.4–10.5)
CHLORIDE SERPL-SCNC: 104 MMOL/L — SIGNIFICANT CHANGE UP (ref 96–108)
CO2 SERPL-SCNC: 25 MMOL/L — SIGNIFICANT CHANGE UP (ref 22–31)
CREAT SERPL-MCNC: 0.81 MG/DL — SIGNIFICANT CHANGE UP (ref 0.5–1.3)
GLUCOSE SERPL-MCNC: 110 MG/DL — HIGH (ref 70–99)
HCT VFR BLD CALC: 36 % — LOW (ref 39–50)
HGB BLD-MCNC: 11.5 G/DL — LOW (ref 13–17)
MAGNESIUM SERPL-MCNC: 2 MG/DL — SIGNIFICANT CHANGE UP (ref 1.6–2.6)
MCHC RBC-ENTMCNC: 29.8 PG — SIGNIFICANT CHANGE UP (ref 27–34)
MCHC RBC-ENTMCNC: 31.9 GM/DL — LOW (ref 32–36)
MCV RBC AUTO: 93.3 FL — SIGNIFICANT CHANGE UP (ref 80–100)
NRBC # BLD: 0 /100 WBCS — SIGNIFICANT CHANGE UP (ref 0–0)
PHOSPHATE SERPL-MCNC: 3.2 MG/DL — SIGNIFICANT CHANGE UP (ref 2.5–4.5)
PLATELET # BLD AUTO: 168 K/UL — SIGNIFICANT CHANGE UP (ref 150–400)
POTASSIUM SERPL-MCNC: 4.8 MMOL/L — SIGNIFICANT CHANGE UP (ref 3.5–5.3)
POTASSIUM SERPL-SCNC: 4.8 MMOL/L — SIGNIFICANT CHANGE UP (ref 3.5–5.3)
RBC # BLD: 3.86 M/UL — LOW (ref 4.2–5.8)
RBC # FLD: 14.3 % — SIGNIFICANT CHANGE UP (ref 10.3–14.5)
SODIUM SERPL-SCNC: 139 MMOL/L — SIGNIFICANT CHANGE UP (ref 135–145)
WBC # BLD: 6.74 K/UL — SIGNIFICANT CHANGE UP (ref 3.8–10.5)
WBC # FLD AUTO: 6.74 K/UL — SIGNIFICANT CHANGE UP (ref 3.8–10.5)

## 2020-07-07 PROCEDURE — 99232 SBSQ HOSP IP/OBS MODERATE 35: CPT

## 2020-07-07 RX ORDER — CILOSTAZOL 100 MG/1
100 TABLET ORAL
Refills: 0 | Status: DISCONTINUED | OUTPATIENT
Start: 2020-07-07 | End: 2020-07-08

## 2020-07-07 RX ORDER — LOSARTAN POTASSIUM 100 MG/1
25 TABLET, FILM COATED ORAL DAILY
Refills: 0 | Status: DISCONTINUED | OUTPATIENT
Start: 2020-07-07 | End: 2020-07-08

## 2020-07-07 RX ADMIN — Medication 50 MILLIGRAM(S): at 05:20

## 2020-07-07 RX ADMIN — Medication 20 MILLIGRAM(S): at 05:20

## 2020-07-07 RX ADMIN — Medication 1 APPLICATION(S): at 17:21

## 2020-07-07 RX ADMIN — CILOSTAZOL 100 MILLIGRAM(S): 100 TABLET ORAL at 17:22

## 2020-07-07 RX ADMIN — Medication 20 MILLIEQUIVALENT(S): at 13:04

## 2020-07-07 RX ADMIN — SIMVASTATIN 40 MILLIGRAM(S): 20 TABLET, FILM COATED ORAL at 21:10

## 2020-07-07 RX ADMIN — HYDROMORPHONE HYDROCHLORIDE 0.25 MILLIGRAM(S): 2 INJECTION INTRAMUSCULAR; INTRAVENOUS; SUBCUTANEOUS at 04:47

## 2020-07-07 RX ADMIN — LOSARTAN POTASSIUM 25 MILLIGRAM(S): 100 TABLET, FILM COATED ORAL at 13:04

## 2020-07-07 RX ADMIN — Medication 1 APPLICATION(S): at 05:20

## 2020-07-07 RX ADMIN — Medication 137 MICROGRAM(S): at 05:20

## 2020-07-07 RX ADMIN — Medication 25 MILLIGRAM(S): at 04:47

## 2020-07-07 RX ADMIN — Medication 81 MILLIGRAM(S): at 13:04

## 2020-07-07 NOTE — CHART NOTE - NSCHARTNOTEFT_GEN_A_CORE
No pod sx intervention. Stable for d/c from podiatric standpoint. [facility] days of discharge. Please call [phone] to schedule an appointment.

## 2020-07-07 NOTE — PROGRESS NOTE ADULT - PROBLEM SELECTOR PROBLEM 2
Tinea pedis of right foot
Tinea pedis of right foot
Cellulitis of foot, right
Cellulitis of foot, right

## 2020-07-07 NOTE — DIETITIAN INITIAL EVALUATION ADULT. - REASON INDICATOR FOR ASSESSMENT
Pt seen for length of stay.   Source: EMR and pt  Pertinent chart information: 75y old  Male who presents with a chief complaint of worsening right foot swelling and pain; s/p angiogram.

## 2020-07-07 NOTE — PROGRESS NOTE ADULT - SUBJECTIVE AND OBJECTIVE BOX
Vascular Surgery Post-op Note   STATUS POST:  RLE angiogram  POST OPERATIVE DAY #: 0    pt seen and examined at bedside. No complaints. denies dizziness, SOB, CP or palpitations. denies f/c/n/v.     Vital Signs Last 24 Hrs  T(C): 36.6 (06 Jul 2020 21:42), Max: 36.6 (06 Jul 2020 18:16)  T(F): 97.8 (06 Jul 2020 21:42), Max: 97.9 (06 Jul 2020 18:16)  HR: 58 (06 Jul 2020 21:42) (58 - 61)  BP: 143/72 (06 Jul 2020 21:42) (129/74 - 168/82)  BP(mean): 102 (06 Jul 2020 19:45) (94 - 108)  RR: 18 (06 Jul 2020 21:42) (16 - 18)  SpO2: 96% (06 Jul 2020 21:42) (95% - 100%)  I&O's Summary    05 Jul 2020 07:01  -  06 Jul 2020 07:00  --------------------------------------------------------  IN: 1735 mL / OUT: 1075 mL / NET: 660 mL    06 Jul 2020 07:01  -  07 Jul 2020 00:40  --------------------------------------------------------  IN: 1000 mL / OUT: 1850 mL / NET: -850 mL      I&O's Detail    05 Jul 2020 07:01  -  06 Jul 2020 07:00  --------------------------------------------------------  IN:    Oral Fluid: 860 mL    sodium chloride 0.9%: 875 mL  Total IN: 1735 mL    OUT:    Voided: 1075 mL  Total OUT: 1075 mL    Total NET: 660 mL      06 Jul 2020 07:01  -  07 Jul 2020 00:40  --------------------------------------------------------  IN:    sodium chloride 0.9%: 1000 mL  Total IN: 1000 mL    OUT:    Voided: 1850 mL  Total OUT: 1850 mL    Total NET: -850 mL          MEDICATIONS  (STANDING):  aspirin enteric coated 81 milliGRAM(s) Oral daily  clobetasol 0.05% Ointment 1 Application(s) Topical two times a day  furosemide    Tablet 20 milliGRAM(s) Oral daily  levothyroxine 137 MICROGram(s) Oral daily  metoprolol succinate ER 50 milliGRAM(s) Oral daily  potassium chloride    Tablet ER 20 milliEquivalent(s) Oral daily  simvastatin 40 milliGRAM(s) Oral at bedtime    MEDICATIONS  (PRN):  hemorrhoidal Ointment 1 Application(s) Rectal three times a day PRN Itching/Burning.  HYDROmorphone  Injectable 0.25 milliGRAM(s) IV Push every 10 minutes PRN Moderate Pain (4 - 6)  hydrOXYzine hydrochloride 25 milliGRAM(s) Oral two times a day PRN Itching  ondansetron Injectable 4 milliGRAM(s) IV Push once PRN Nausea and/or Vomiting      LABS:                        11.5   5.97  )-----------( 160      ( 06 Jul 2020 12:20 )             36.4     07-06    140  |  106  |  28<H>  ----------------------------<  111<H>  4.4   |  26  |  0.79    Ca    9.5      06 Jul 2020 12:20  Phos  3.1     07-06  Mg     2.0     07-06      PT/INR - ( 06 Jul 2020 12:20 )   PT: 13.0 sec;   INR: 1.14 ratio         PTT - ( 06 Jul 2020 12:20 )  PTT:40.5 sec      RADIOLOGY & ADDITIONAL STUDIES:    PHYSICAL EXAM:    Constitutional: NAD, A&O x 4  Respiratory: non-labor breathing  Cardiovascular: RRR  Gastrointestinal: abd soft, ND/NT  Extremities: left groin with pressure dressing in place, w/o strikethrough bleeding. LEs skin warm to touch. + palpable PT/DP b/l.     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 Concern for venous insufficiency and arterial insufficiency w small vessel arterial disease. s/p RLE angiogram POD #0 stable     - Diet: regular diet.   - Activity: lay flat for 2 hours, then OOB as tolerated.   - Labs: f/u AM labs  - Pain: tylenol, oxycodone prn   - recommend individual toe amputation if Podiatry is planning for amputation   - recommend to start Pletal     Vascular Surgery   [id number] Vascular Surgery Post-op Note   STATUS POST:  RLE angiogram  POST OPERATIVE DAY #: 0    pt seen and examined at bedside. No complaints. denies dizziness, SOB, CP or palpitations. denies f/c/n/v.     Vital Signs Last 24 Hrs  T(C): 36.6 (06 Jul 2020 21:42), Max: 36.6 (06 Jul 2020 18:16)  T(F): 97.8 (06 Jul 2020 21:42), Max: 97.9 (06 Jul 2020 18:16)  HR: 58 (06 Jul 2020 21:42) (58 - 61)  BP: 143/72 (06 Jul 2020 21:42) (129/74 - 168/82)  BP(mean): 102 (06 Jul 2020 19:45) (94 - 108)  RR: 18 (06 Jul 2020 21:42) (16 - 18)  SpO2: 96% (06 Jul 2020 21:42) (95% - 100%)  I&O's Summary    05 Jul 2020 07:01  -  06 Jul 2020 07:00  --------------------------------------------------------  IN: 1735 mL / OUT: 1075 mL / NET: 660 mL    06 Jul 2020 07:01  -  07 Jul 2020 00:40  --------------------------------------------------------  IN: 1000 mL / OUT: 1850 mL / NET: -850 mL      I&O's Detail    05 Jul 2020 07:01  -  06 Jul 2020 07:00  --------------------------------------------------------  IN:    Oral Fluid: 860 mL    sodium chloride 0.9%: 875 mL  Total IN: 1735 mL    OUT:    Voided: 1075 mL  Total OUT: 1075 mL    Total NET: 660 mL      06 Jul 2020 07:01  -  07 Jul 2020 00:40  --------------------------------------------------------  IN:    sodium chloride 0.9%: 1000 mL  Total IN: 1000 mL    OUT:    Voided: 1850 mL  Total OUT: 1850 mL    Total NET: -850 mL          MEDICATIONS  (STANDING):  aspirin enteric coated 81 milliGRAM(s) Oral daily  clobetasol 0.05% Ointment 1 Application(s) Topical two times a day  furosemide    Tablet 20 milliGRAM(s) Oral daily  levothyroxine 137 MICROGram(s) Oral daily  metoprolol succinate ER 50 milliGRAM(s) Oral daily  potassium chloride    Tablet ER 20 milliEquivalent(s) Oral daily  simvastatin 40 milliGRAM(s) Oral at bedtime    MEDICATIONS  (PRN):  hemorrhoidal Ointment 1 Application(s) Rectal three times a day PRN Itching/Burning.  HYDROmorphone  Injectable 0.25 milliGRAM(s) IV Push every 10 minutes PRN Moderate Pain (4 - 6)  hydrOXYzine hydrochloride 25 milliGRAM(s) Oral two times a day PRN Itching  ondansetron Injectable 4 milliGRAM(s) IV Push once PRN Nausea and/or Vomiting      LABS:                        11.5   5.97  )-----------( 160      ( 06 Jul 2020 12:20 )             36.4     07-06    140  |  106  |  28<H>  ----------------------------<  111<H>  4.4   |  26  |  0.79    Ca    9.5      06 Jul 2020 12:20  Phos  3.1     07-06  Mg     2.0     07-06      PT/INR - ( 06 Jul 2020 12:20 )   PT: 13.0 sec;   INR: 1.14 ratio         PTT - ( 06 Jul 2020 12:20 )  PTT:40.5 sec      RADIOLOGY & ADDITIONAL STUDIES:    PHYSICAL EXAM:    Constitutional: NAD, A&O x 4  Respiratory: non-labor breathing  Cardiovascular: RRR  Gastrointestinal: abd soft, ND/NT  Extremities: left groin with pressure dressing in place, w/o strikethrough bleeding. LEs skin warm to touch. + palpable PT/DP b/l.     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 Concern for venous insufficiency and arterial insufficiency w small vessel arterial disease. s/p RLE angiogram POD #0 stable     - Diet: regular diet.   - Activity: lay flat for 2 hours, then OOB as tolerated.   - Labs: f/u AM labs  - Pain: tylenol, oxycodone prn   - recommend individual toe amputation if Podiatry is planning for amputation   - recommend to start Pletal   - Continue care with primary team    Vascular Surgery   [id number] Vascular Surgery Post-op Note   STATUS POST:  RLE angiogram  POST OPERATIVE DAY #: 0    pt seen and examined at bedside. No complaints. denies dizziness, SOB, CP or palpitations. denies f/c/n/v.     Vital Signs Last 24 Hrs  T(C): 36.6 (06 Jul 2020 21:42), Max: 36.6 (06 Jul 2020 18:16)  T(F): 97.8 (06 Jul 2020 21:42), Max: 97.9 (06 Jul 2020 18:16)  HR: 58 (06 Jul 2020 21:42) (58 - 61)  BP: 143/72 (06 Jul 2020 21:42) (129/74 - 168/82)  BP(mean): 102 (06 Jul 2020 19:45) (94 - 108)  RR: 18 (06 Jul 2020 21:42) (16 - 18)  SpO2: 96% (06 Jul 2020 21:42) (95% - 100%)  I&O's Summary    05 Jul 2020 07:01  -  06 Jul 2020 07:00  --------------------------------------------------------  IN: 1735 mL / OUT: 1075 mL / NET: 660 mL    06 Jul 2020 07:01  -  07 Jul 2020 00:40  --------------------------------------------------------  IN: 1000 mL / OUT: 1850 mL / NET: -850 mL      I&O's Detail    05 Jul 2020 07:01  -  06 Jul 2020 07:00  --------------------------------------------------------  IN:    Oral Fluid: 860 mL    sodium chloride 0.9%: 875 mL  Total IN: 1735 mL    OUT:    Voided: 1075 mL  Total OUT: 1075 mL    Total NET: 660 mL      06 Jul 2020 07:01  -  07 Jul 2020 00:40  --------------------------------------------------------  IN:    sodium chloride 0.9%: 1000 mL  Total IN: 1000 mL    OUT:    Voided: 1850 mL  Total OUT: 1850 mL    Total NET: -850 mL      MEDICATIONS  (STANDING):  aspirin enteric coated 81 milliGRAM(s) Oral daily  clobetasol 0.05% Ointment 1 Application(s) Topical two times a day  furosemide    Tablet 20 milliGRAM(s) Oral daily  levothyroxine 137 MICROGram(s) Oral daily  metoprolol succinate ER 50 milliGRAM(s) Oral daily  potassium chloride    Tablet ER 20 milliEquivalent(s) Oral daily  simvastatin 40 milliGRAM(s) Oral at bedtime    MEDICATIONS  (PRN):  hemorrhoidal Ointment 1 Application(s) Rectal three times a day PRN Itching/Burning.  HYDROmorphone  Injectable 0.25 milliGRAM(s) IV Push every 10 minutes PRN Moderate Pain (4 - 6)  hydrOXYzine hydrochloride 25 milliGRAM(s) Oral two times a day PRN Itching  ondansetron Injectable 4 milliGRAM(s) IV Push once PRN Nausea and/or Vomiting      LABS:                        11.5   5.97  )-----------( 160      ( 06 Jul 2020 12:20 )             36.4     07-06    140  |  106  |  28<H>  ----------------------------<  111<H>  4.4   |  26  |  0.79    Ca    9.5      06 Jul 2020 12:20  Phos  3.1     07-06  Mg     2.0     07-06      PT/INR - ( 06 Jul 2020 12:20 )   PT: 13.0 sec;   INR: 1.14 ratio         PTT - ( 06 Jul 2020 12:20 )  PTT:40.5 sec      RADIOLOGY & ADDITIONAL STUDIES:    PHYSICAL EXAM:    Constitutional: NAD, A&O x 4  Respiratory: non-labor breathing  Cardiovascular: RRR  Gastrointestinal: abd soft, ND/NT  Extremities: left groin with pressure dressing in place, w/o strikethrough bleeding. LEs skin warm to touch. + palpable PT/DP b/l.

## 2020-07-07 NOTE — PROGRESS NOTE ADULT - PROBLEM SELECTOR PROBLEM 4
Varicose veins of bilateral lower extremities with other complications
Varicose veins of bilateral lower extremities with other complications

## 2020-07-07 NOTE — PROGRESS NOTE ADULT - ATTENDING COMMENTS
Jaison Nolan  Pager: [phone]. If no response or past 5 pm call [phone].
Dora Chaudhry M.D. ,   Pager [phone]     after 5PM/ weekends [phone]      ID service will be covering over the three day  weekend. Please call for acute issues or questions. [phone]
Dora Chaudhry M.D. ,   Pager [phone]     after 5PM/ weekends [phone]      ID will follow the patient PRN. Please recontact ID if we can be of further assistance . [phone]
I Christopher Sharma MD have seen and examined the patient today and agree with  the  evaluation, assessment and plan of the surgical house officer  JERICHO Sharma MD have personally seen and examined the patient at bedside today at  830 pm    cardiology imput reviewed  d/w anesthesis who recommends to have st first   pt is unable to lie still w only local anesthesia  and w/o sedation due to above reasons  I made it clear to pt and girlfriend that the rle angio is needed and from the vasc surg standpoint this is an emergency but it also needs to be performed safely   OR cancelled and will mook for mon tent
I Christopher Sharma MD have seen and examined the patient today and agree with  the  evaluation, assessment and plan of the surgical house officer  JERICHO Sharma MD have personally seen and examined the patient at bedside today at  6 pm

## 2020-07-07 NOTE — PROGRESS NOTE ADULT - PROBLEM SELECTOR PLAN 2
- podiatry eval noted  - vascular eval pending
- s/p RLE angiogram with vascular  - podiatry follow-up
I Christopher Sharma MD have seen and examined the patient today and agree with  the  evaluation, assessment and plan of the surgical house officer
I Christopher Sharma MD have seen and examined the patient today and agree with  the  evaluation, assessment and plan of the surgical house officer

## 2020-07-07 NOTE — DIETITIAN INITIAL EVALUATION ADULT. - ADD RECOMMEND
1) Recommend low sodium diet to promote diuresis. 2) Reinforce diet education as needed; RD remains available. 3) Monitor PO intake, diet tolerance, weight trends, labs, GI function, and skin integrity.

## 2020-07-07 NOTE — PROGRESS NOTE ADULT - SUBJECTIVE AND OBJECTIVE BOX
afebrile    REVIEW OF SYSTEMS:  GEN: no fever,    no chills  RESP: no SOB,   no cough  CVS: no chest pain,   no palpitations  GI: no abdominal pain,   no nausea,   no vomiting,   no constipation,   no diarrhea  : no dysuria,   no frequency  NEURO: no headache,   no dizziness  PSYCH: no depression,   not anxious  Derm : no rash    MEDICATIONS  (STANDING):  aspirin enteric coated 81 milliGRAM(s) Oral daily  cilostazol 100 milliGRAM(s) Oral two times a day  clobetasol 0.05% Ointment 1 Application(s) Topical two times a day  furosemide    Tablet 20 milliGRAM(s) Oral daily  levothyroxine 137 MICROGram(s) Oral daily  losartan 25 milliGRAM(s) Oral daily  metoprolol succinate ER 50 milliGRAM(s) Oral daily  potassium chloride    Tablet ER 20 milliEquivalent(s) Oral daily  simvastatin 40 milliGRAM(s) Oral at bedtime    MEDICATIONS  (PRN):  hemorrhoidal Ointment 1 Application(s) Rectal three times a day PRN Itching/Burning.  hydrOXYzine hydrochloride 25 milliGRAM(s) Oral two times a day PRN Itching  ondansetron Injectable 4 milliGRAM(s) IV Push once PRN Nausea and/or Vomiting      Vital Signs Last 24 Hrs  T(C): 36.9 (07 Jul 2020 04:40), Max: 36.9 (07 Jul 2020 04:40)  T(F): 98.4 (07 Jul 2020 04:40), Max: 98.4 (07 Jul 2020 04:40)  HR: 60 (07 Jul 2020 04:40) (58 - 61)  BP: 143/70 (07 Jul 2020 04:40) (129/74 - 168/82)  BP(mean): 102 (06 Jul 2020 19:45) (94 - 108)  RR: 18 (07 Jul 2020 04:40) (16 - 18)  SpO2: 97% (07 Jul 2020 04:40) (95% - 100%)  CAPILLARY BLOOD GLUCOSE        I&O's Summary    06 Jul 2020 07:01  -  07 Jul 2020 07:00  --------------------------------------------------------  IN: 1740 mL / OUT: 2450 mL / NET: -710 mL        PHYSICAL EXAM:  HEAD:  Atraumatic, Normocephalic  NECK: Supple, No   JVD  CHEST/LUNG:   no     rales,     no,    rhonchi  HEART: Regular rate and rhythm;         murmur  ABDOMEN: Soft, Nontender, ;   EXTREMITIESless  edema  NEUROLOGY:  alert    LABS:                        11.5   6.74  )-----------( 168      ( 07 Jul 2020 06:36 )             36.0     07-07    139  |  104  |  23  ----------------------------<  110<H>  4.8   |  25  |  0.81    Ca    9.6      07 Jul 2020 06:37  Phos  3.2     07-07  Mg     2.0     07-07      PT/INR - ( 06 Jul 2020 12:20 )   PT: 13.0 sec;   INR: 1.14 ratio         PTT - ( 06 Jul 2020 12:20 )  PTT:40.5 sec                Thyroid Stimulating Hormone, Serum: 2.92 uIU/mL (06-30 @ 09:01)          Consultant(s) Notes Reviewed:      Care Discussed with Consultants/Other Providers:

## 2020-07-07 NOTE — CHART NOTE - NSCHARTNOTEFT_GEN_A_CORE
Vascular Surgery has determined that no further vascular interventions are needed at this point in time. If there are any questions, please page [phone]. 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 Because there was concern for venous insufficiency and arterial insufficiency w/ small vessel arterial disease, a RLE angio was completed by vascular surgery. Podiatry did not want to proceed with surgical intervention based off of the angio; they state that the pt is stable for d/c from their standpoint. Vascular surgery agrees with podiatry for d/c. Vascular Surgery has determined that no further vascular interventions are needed at this point in time.    Please contact Vascular Surgery (p. [phone]) with any questions.     Kip Frank, PGY1  Vascular Team   Pager [phone]  [facility].

## 2020-07-07 NOTE — PROGRESS NOTE ADULT - SUBJECTIVE AND OBJECTIVE BOX
CARDIOLOGY     PROGRESS  NOTE   ________________________________________________    CHIEF COMPLAINT:Patient is a 75y old  Male who presents with a chief complaint of worsening right foot swelling and pain (07 Jul 2020 00:39)  no complain.  	  REVIEW OF SYSTEMS:  CONSTITUTIONAL: No fever, weight loss, or fatigue  EYES: No eye pain, visual disturbances, or discharge  ENT:  No difficulty hearing, tinnitus, vertigo; No sinus or throat pain  NECK: No pain or stiffness  RESPIRATORY: No cough, wheezing, chills or hemoptysis; No Shortness of Breath  CARDIOVASCULAR: No chest pain, palpitations, passing out, dizziness, or leg swelling  GASTROINTESTINAL: No abdominal or epigastric pain. No nausea, vomiting, or hematemesis; No diarrhea or constipation. No melena or hematochezia.  GENITOURINARY: No dysuria, frequency, hematuria, or incontinence  NEUROLOGICAL: No headaches, memory loss, loss of strength, numbness, or tremors  SKIN: No itching, burning, rashes, or lesions   LYMPH Nodes: No enlarged glands  ENDOCRINE: No heat or cold intolerance; No hair loss  MUSCULOSKELETAL: No joint pain or swelling; No muscle, back, or extremity pain  PSYCHIATRIC: No depression, anxiety, mood swings, or difficulty sleeping  HEME/LYMPH: No easy bruising, or bleeding gums  ALLERGY AND IMMUNOLOGIC: No hives or eczema	    [ ] All others negative	  [ ] Unable to obtain    PHYSICAL EXAM:  T(C): 36.9 (07-07-20 @ 04:40), Max: 36.9 (07-07-20 @ 04:40)  HR: 60 (07-07-20 @ 04:40) (58 - 61)  BP: 143/70 (07-07-20 @ 04:40) (129/74 - 168/82)  RR: 18 (07-07-20 @ 04:40) (16 - 18)  SpO2: 97% (07-07-20 @ 04:40) (95% - 100%)  Wt(kg): --  I&O's Summary    06 Jul 2020 07:01  -  07 Jul 2020 07:00  --------------------------------------------------------  IN: 1740 mL / OUT: 2450 mL / NET: -710 mL        Appearance: Normal	  HEENT:   Normal oral mucosa, PERRL, EOMI	  Lymphatic: No lymphadenopathy  Cardiovascular: Normal S1 S2, No JVD, + murmurs, No edema  Respiratory: Lungs clear to auscultation	  Psychiatry: A & O x 3, Mood & affect appropriate  Gastrointestinal:  Soft, Non-tender, + BS	  Skin: No rashes, No ecchymoses, No cyanosis	  Neurologic: Non-focal  Extremities: Normal range of motion, No clubbing, cyanosis or edema  Vascular: Peripheral pulses palpable 2+ bilaterally    MEDICATIONS  (STANDING):  aspirin enteric coated 81 milliGRAM(s) Oral daily  clobetasol 0.05% Ointment 1 Application(s) Topical two times a day  furosemide    Tablet 20 milliGRAM(s) Oral daily  levothyroxine 137 MICROGram(s) Oral daily  metoprolol succinate ER 50 milliGRAM(s) Oral daily  potassium chloride    Tablet ER 20 milliEquivalent(s) Oral daily  simvastatin 40 milliGRAM(s) Oral at bedtime      TELEMETRY: 	    ECG:  	  RADIOLOGY:  OTHER: 	  	  LABS:	 	    CARDIAC MARKERS:                                11.5   6.74  )-----------( 168      ( 07 Jul 2020 06:36 )             36.0     07-06    140  |  106  |  28<H>  ----------------------------<  111<H>  4.4   |  26  |  0.79    Ca    9.5      06 Jul 2020 12:20  Phos  3.1     07-06  Mg     2.0     07-06      proBNP:   Lipid Profile:   HgA1c:   TSH: Thyroid Stimulating Hormone, Serum: 2.92 uIU/mL (06-30 @ 09:01)    PT/INR - ( 06 Jul 2020 12:20 )   PT: 13.0 sec;   INR: 1.14 ratio         PTT - ( 06 Jul 2020 12:20 )  PTT:40.5 sec  - Diet: regular diet.   - Activity: lay flat for 2 hours, then OOB as tolerated.   - Labs: f/u AM labs  - Pain: tylenol, oxycodone prn   - recommend individual toe amputation if Podiatry is planning for amputation   - recommend to start Pletal   - Continue care with primary team    Assessment and plan  ---------------------------  75 year old male PMH PPM, CAD s/p stent on ASA, prior A-fib not on AC, hypothyroid, and total left knee replacement p/w R foot swelling and pain. Patient states this has been worsening for past 1 year. Seen in ER 6/10 and started on Augmentin x10 days without improvement. Visiting nurse has been bathing foot w/ vinegar and warm water but discharge and bleeding has been increasing.   pt with hx of sss, ?paf , s/p ppm, cad, who presented to ER with possible cellulitis with LE edema  asa daily  abx as per MEDICINE  ppm interrogated runs of wct, +intermittent a.fib short burse  ac if no contraindication  le arterial doppler/ PVD  increase  beta blocker as tolerated  echo with normal ef, stress test ?mild ischemia  continue beta  blocker  add ace inhibitor or ARB as bp elevated  s/p angiogram no intervention

## 2020-07-07 NOTE — PROGRESS NOTE ADULT - SUBJECTIVE AND OBJECTIVE BOX
INTERVAL HPI/OVERNIGHT EVENTS:    patient seen and examined  tolerating PO without nausea, vomiting, abdominal pain  hgb stable       MEDICATIONS  (STANDING):  aspirin enteric coated 81 milliGRAM(s) Oral daily  enoxaparin Injectable 40 milliGRAM(s) SubCutaneous daily  furosemide    Tablet 20 milliGRAM(s) Oral daily  levothyroxine 137 MICROGram(s) Oral daily  metoprolol succinate ER 25 milliGRAM(s) Oral daily  piperacillin/tazobactam IVPB.. 3.375 Gram(s) IV Intermittent every 8 hours  potassium chloride    Tablet ER 20 milliEquivalent(s) Oral daily  simvastatin 40 milliGRAM(s) Oral at bedtime    MEDICATIONS  (PRN):      Allergies    afluzosin (Hives)  levofloxacin (Hives)  Myrbetriq (Hives)  tamsulosin (Hives)    Intolerances        Review of Systems:    General:  No wt loss, fevers, chills, night sweats,fatigue,   Eyes:  Good vision, no reported pain  ENT:  No sore throat, pain, runny nose, dysphagia  CV:  No pain, palpitatioins, hypo/hypertension  Resp:  No dyspnea, cough, tachypnea, wheezing  GI:  No pain, No nausea, No vomiting, No diarrhea, No constipatiion, No weight loss, No fever, No pruritis, No rectal bleeding, No tarry stools, No dysphagia,  :  No pain, bleeding, incontinence, nocturia  Muscle:  No pain, weakness  Neuro:  No weakness, tingling, memory problems  Psych:  No fatigue, insomnia, mood problems, depression  Endocrine:  No polyuria, polydypsia, cold/heat intolerance  Heme:  No petechiae, ecchymosis, easy bruisability  Skin:  No rash, tattoos, scars, edema      Vital Signs Last 24 Hrs  T(C): 36.6 (01 Jul 2020 05:31), Max: 36.7 (30 Jun 2020 19:44)  T(F): 97.9 (01 Jul 2020 05:31), Max: 98.1 (30 Jun 2020 19:44)  HR: 96 (01 Jul 2020 05:31) (59 - 96)  BP: 127/70 (01 Jul 2020 05:31) (111/63 - 127/70)  BP(mean): --  RR: 18 (01 Jul 2020 05:31) (18 - 18)  SpO2: 94% (01 Jul 2020 05:31) (94% - 94%)    PHYSICAL EXAM:    Constitutional: NAD, well-developed  HEENT: EOMI, throat clear  Neck: No LAD, supple  Gastrointestinal: BS+, soft, NT/ND, neg HSM,  Extremities:  no edema  Neurological: A/O x 3, no focal deficits        LABS:                        10.7   5.09  )-----------( 149      ( 01 Jul 2020 06:39 )             33.2     07-01    138  |  101  |  22  ----------------------------<  100<H>  3.7   |  26  |  1.06    Ca    8.9      01 Jul 2020 06:39            RADIOLOGY & ADDITIONAL TESTS:

## 2020-07-07 NOTE — PROGRESS NOTE ADULT - PROBLEM SELECTOR PROBLEM 3
ACP (advance care planning)
ACP (advance care planning)
Venous insufficiency of both lower extremities
Venous insufficiency of both lower extremities

## 2020-07-07 NOTE — DIETITIAN INITIAL EVALUATION ADULT. - PERTINENT MEDS FT
MEDICATIONS  (STANDING):  aspirin enteric coated 81 milliGRAM(s) Oral daily  cilostazol 100 milliGRAM(s) Oral two times a day  clobetasol 0.05% Ointment 1 Application(s) Topical two times a day  furosemide    Tablet 20 milliGRAM(s) Oral daily  levothyroxine 137 MICROGram(s) Oral daily  losartan 25 milliGRAM(s) Oral daily  metoprolol succinate ER 50 milliGRAM(s) Oral daily  potassium chloride    Tablet ER 20 milliEquivalent(s) Oral daily  simvastatin 40 milliGRAM(s) Oral at bedtime    MEDICATIONS  (PRN):  hemorrhoidal Ointment 1 Application(s) Rectal three times a day PRN Itching/Burning.  hydrOXYzine hydrochloride 25 milliGRAM(s) Oral two times a day PRN Itching  ondansetron Injectable 4 milliGRAM(s) IV Push once PRN Nausea and/or Vomiting

## 2020-07-07 NOTE — PROGRESS NOTE ADULT - PROBLEM SELECTOR PLAN 4
I Christopher Sharma MD have seen and examined the patient today and agree with  the  evaluation, assessment and plan of the surgical house officer
I Christopher Sharma MD have seen and examined the patient today and agree with  the  evaluation, assessment and plan of the surgical house officer

## 2020-07-07 NOTE — PROGRESS NOTE ADULT - ASSESSMENT
75 year old male PMH PPM, CAD s/p stent on ASA, prior A-fib not on AC, hypothyroid, and total left knee replacement p/w R foot swelling and pain. Patient states this has been worsening for past 1 year. Seen in ER 6/10 and started on Augmentin x10 days without improvement. Visiting nurse has been bathing foot w/ vinegar and warm water but discharge and bleeding has been increasing.     Spoke w/ Dr. Lainez who would like the patient admitted to Dr. Washington for right foot cellulitis. Patient also notes rash across the upper torso for past week, pruritic on R arm, and a rash in the groin. Patient denies f/c, cp, sob, abd pain, nausea, vomiting, diarrhea, constipation, or weakness.     Podiatry/Wound care- Dr. 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 pt ultimately had infection and required multiple surgeries.   he was on chronic suppression with doxy and cipro  he went with his girlfriend to her dermatologist a year ago. the dermatologist noticed a dark spot that she biopsied for cancer. the patient started leaking after the biopsy and has had problems since ( according to patient)      # cellulitis: changes on the foot going on for almost a year, given the chronicity of symptoms and no systemic signs of infection doubt cellulitis   also pt mentions no change in the foot even after 6 days of abx also pointing towards non infectious etiology   s/p derm eval, per derm doubt cellulitis  stopped zosyn and observe off  topical agent application per derm   c/w wound care   foot looks about the same or slightly improved    Rash: does not appear drug rash.   improved with local care    #PVD   per vascular  s/p angiogram yesterday

## 2020-07-07 NOTE — PROGRESS NOTE ADULT - ASSESSMENT
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 Concern for venous insufficiency and arterial insufficiency w small vessel arterial disease. s/p RLE angiogram POD #0 stable     - Diet: regular diet.   - Activity: lay flat for 2 hours, then OOB as tolerated.   - Labs: f/u AM labs  - Pain: tylenol, oxycodone prn   - recommend individual toe amputation if Podiatry is planning for amputation   - recommend to start Pletal   - Continue care with primary team    Vascular Surgery   [id number] A/P: 75y y/o male with chronic non-resolving interdigital tenia infection of the right lower extremity. Concern for venous insufficiency and arterial insufficiency w small vessel arterial disease. s/p RLE angiogram POD #0 stable     - reviewed w pt rle angio  pt on pletal 50 bid  woundcare as per podiatry  will follow

## 2020-07-07 NOTE — PROGRESS NOTE ADULT - SUBJECTIVE AND OBJECTIVE BOX
Patient is a 75y old  Male who presents with a chief complaint of worsening right foot swelling and pain (07 Jul 2020 00:39)    Being followed by ID for        Interval history:  s/p angiogram yesterday  pt seen with physical therapy  No other acute events        PAST MEDICAL & SURGICAL HISTORY:  Pacemaker  Hypothyroid  Varicose vein of leg: had surgery in 1993  CAD (coronary artery disease)  Afib  History of total left knee replacement (TKR)    Allergies    afluzosin (Hives)  levofloxacin (Hives)  Myrbetriq (Hives)  tamsulosin (Hives)    Intolerances      Antimicrobials:      MEDICATIONS  (STANDING):  aspirin enteric coated 81 milliGRAM(s) Oral daily  cilostazol 100 milliGRAM(s) Oral two times a day  clobetasol 0.05% Ointment 1 Application(s) Topical two times a day  furosemide    Tablet 20 milliGRAM(s) Oral daily  levothyroxine 137 MICROGram(s) Oral daily  losartan 25 milliGRAM(s) Oral daily  metoprolol succinate ER 50 milliGRAM(s) Oral daily  potassium chloride    Tablet ER 20 milliEquivalent(s) Oral daily  simvastatin 40 milliGRAM(s) Oral at bedtime      Vital Signs Last 24 Hrs  T(C): 36.9 (07-07-20 @ 04:40), Max: 36.9 (07-07-20 @ 04:40)  T(F): 98.4 (07-07-20 @ 04:40), Max: 98.4 (07-07-20 @ 04:40)  HR: 60 (07-07-20 @ 04:40) (58 - 61)  BP: 143/70 (07-07-20 @ 04:40) (129/74 - 168/82)  BP(mean): 102 (07-06-20 @ 19:45) (94 - 108)  RR: 18 (07-07-20 @ 04:40) (16 - 18)  SpO2: 97% (07-07-20 @ 04:40) (95% - 100%)    Physical Exam:    Constitutional well preserved,comfortable,pleasant    HEENT PERRLA EOMI,No pallor or icterus    No oral exudate or erythema    Neck supple no JVD or LN    Chest Good AE,CTA    CVS S1 S2 WNl    Abd soft BS normal No tenderness     Ext  right LE toes wrapped  skin slightly softer  LLE shortening    rash on back and chest significantly improved  still prominent on arms     IV site no erythema tenderness or discharge    Joints no swelling or LOM    CNS AAO X 3 no focal    Lab Data:                          11.5   6.74  )-----------( 168      ( 07 Jul 2020 06:36 )             36.0       07-07    139  |  104  |  23  ----------------------------<  110<H>  4.8   |  25  |  0.81    Ca    9.6      07 Jul 2020 06:37  Phos  3.2     07-07  Mg     2.0     07-07          WBC Count: 6.74 (07-07-20 @ 06:36)  WBC Count: 5.97 (07-06-20 @ 12:20)  WBC Count: 6.30 (07-05-20 @ 16:14)  WBC Count: 5.92 (07-05-20 @ 06:35)  WBC Count: 5.39 (07-02-20 @ 07:38)  WBC Count: 5.09 (07-01-20 @ 06:39)        · Operative Findings	co-dominance of PT and AT arteries into foot (60% AT, 40% PT)  small vessel disease in calf venous insufficiency in calf

## 2020-07-07 NOTE — PROGRESS NOTE ADULT - ASSESSMENT
75 year old male         PMH PPM, CAD s/p stent on ASA, A-fib, hypothyroid, and total left knee replacement      p/w R foot swelling and pain.     Patient states this has been worsening for past 1 year.  on  augmentin,  with  no  improvement   Patient also notes rash across the upper torso/ back  for past week, pruritic on R arm, and rash in the groin         Right foot cellulits affecting toes./  inter  digital  uklcers ,, was  on   IV Zosyn.     Podiatry   saw  pt/  no  intervention/  foot x-rays negative for OM.      Lasix ,   for bilateral leg edema. . ha s impproved   LE venous dopplers, no  dvt      ASA   prior stent in 2007./   AFIB, PPM   h/o  AFIB,    no  a/c//  not  sure  why ?/    card  eval  Anemia,  gi  eval dr joann sanchez   interrogate  PPM/ if  pt  has  afib, as stored   event, , then  will need  to start  a/c/  pt  made  aware   of  risks  out  pt  card is  dr mikayla dick     echo,  normal  Ef  PPM  interrogation  with  WCT/   bursts  of  afib/   on lovenox  bid    stress  test   for  WCT  derm eval   noted,   dermatitis/ on   atarax/  lidex  cream  angio on   7/6.  small  vessel disease  if  podiatry   plans  no  intervention, then  may  start   d/c  planning

## 2020-07-07 NOTE — PROGRESS NOTE ADULT - PROBLEM SELECTOR PLAN 3
Advanced care planning was discussed with patient and family.  Advanced care planning forms were reviewed and discussed.  Risks, benefits and alternatives of gastroenterologic procedures were discussed in detail and all questions were answered.    30 minutes spent.
Advanced care planning was discussed with patient and family.  Advanced care planning forms were reviewed and discussed.  Risks, benefits and alternatives of gastroenterologic procedures were discussed in detail and all questions were answered.    30 minutes spent.
I Christopher Sharma MD have seen and examined the patient today and agree with  the  evaluation, assessment and plan of the surgical house officer
I Christopher Sharma MD have seen and examined the patient today and agree with  the  evaluation, assessment and plan of the surgical house officer

## 2020-07-07 NOTE — DIETITIAN INITIAL EVALUATION ADULT. - PHYSICAL APPEARANCE
well nourished/overweight/other (specify) Ht: 67in, Dosing Wt: 229lbs, BMI: 36kg/m2, IBW: 148lbs +/- 10%  Edema: 2+ (left ankle and foot)   Skin per nursing flowsheets: no pressure injury documented

## 2020-07-07 NOTE — PROGRESS NOTE ADULT - PROBLEM SELECTOR PLAN 1
- no rectal bleeding; hgb stable   - c/w hemorrhoidal cream prn   - diet as tolerated  - monitor cbc  - outpatient colonoscopy with Dr. Frank  - diet as tolerated
- suspect anemia related to acute blood loss from R foot + hemorrhoidal bleed  - patient refusing anusol suppository  - miralax prn to keep stools soft  - diet as tolerated  - monitor cbc  - outpatient colonoscopy with Dr. Frank  - diet as tolerated   - d/w patient
I Christopher Sharma MD have seen and examined the patient today and agree with  the  evaluation, assessment and plan of the surgical house officer
I Christopher Sharma MD have seen and examined the patient today and agree with  the  evaluation, assessment and plan of the surgical house officer

## 2020-07-07 NOTE — DIETITIAN INITIAL EVALUATION ADULT. - OTHER INFO
Visited pt at bedside. Pt reports having a good appetite both PTA and in-house; consuming >75% of most meals. Pt denies any known food allergies or intolerances. Pt denies any chewing/swallowing difficulty, self-feeding difficulty, nausea/vomiting, constipation, or diarrhea. Pt denies any micronutrient supplementation at home.     Pt reports a UBW of 185lbs but unsure of how long ago. Pt endorses significant weight gain to 240lbs due to fluid accumulation. Pt endorses gradual weight loss to current dosing weight of 229lbs.     Education: Pt reports that his diet is not the best. Pt normally eats TV dinners and fast food at home. Provided education on Heart-Healthy Nutrition Therapy including sources of lean protein, heart healthy fats, limiting high sodium foods and incorporating fruits, vegetables and whole grains in the diet. Reviewed shopping tips and label reading tips.

## 2020-07-08 ENCOUNTER — TRANSCRIPTION ENCOUNTER (OUTPATIENT)
Age: 75
End: 2020-07-08

## 2020-07-08 VITALS
WEIGHT: 204.81 LBS | TEMPERATURE: 98 F | RESPIRATION RATE: 18 BRPM | OXYGEN SATURATION: 94 % | SYSTOLIC BLOOD PRESSURE: 141 MMHG | DIASTOLIC BLOOD PRESSURE: 69 MMHG | HEART RATE: 77 BPM

## 2020-07-08 PROCEDURE — 93970 EXTREMITY STUDY: CPT

## 2020-07-08 PROCEDURE — A9500: CPT

## 2020-07-08 PROCEDURE — 97530 THERAPEUTIC ACTIVITIES: CPT

## 2020-07-08 PROCEDURE — 84100 ASSAY OF PHOSPHORUS: CPT

## 2020-07-08 PROCEDURE — C1887: CPT

## 2020-07-08 PROCEDURE — 84443 ASSAY THYROID STIM HORMONE: CPT

## 2020-07-08 PROCEDURE — 80053 COMPREHEN METABOLIC PANEL: CPT

## 2020-07-08 PROCEDURE — 86803 HEPATITIS C AB TEST: CPT

## 2020-07-08 PROCEDURE — 99285 EMERGENCY DEPT VISIT HI MDM: CPT

## 2020-07-08 PROCEDURE — 93306 TTE W/DOPPLER COMPLETE: CPT

## 2020-07-08 PROCEDURE — 73630 X-RAY EXAM OF FOOT: CPT

## 2020-07-08 PROCEDURE — 85610 PROTHROMBIN TIME: CPT

## 2020-07-08 PROCEDURE — 86140 C-REACTIVE PROTEIN: CPT

## 2020-07-08 PROCEDURE — 85730 THROMBOPLASTIN TIME PARTIAL: CPT

## 2020-07-08 PROCEDURE — C1760: CPT

## 2020-07-08 PROCEDURE — 78452 HT MUSCLE IMAGE SPECT MULT: CPT

## 2020-07-08 PROCEDURE — 85652 RBC SED RATE AUTOMATED: CPT

## 2020-07-08 PROCEDURE — 97161 PT EVAL LOW COMPLEX 20 MIN: CPT

## 2020-07-08 PROCEDURE — 86900 BLOOD TYPING SEROLOGIC ABO: CPT

## 2020-07-08 PROCEDURE — C1769: CPT

## 2020-07-08 PROCEDURE — 76000 FLUOROSCOPY <1 HR PHYS/QHP: CPT

## 2020-07-08 PROCEDURE — 93923 UPR/LXTR ART STDY 3+ LVLS: CPT

## 2020-07-08 PROCEDURE — 85027 COMPLETE CBC AUTOMATED: CPT

## 2020-07-08 PROCEDURE — 93017 CV STRESS TEST TRACING ONLY: CPT

## 2020-07-08 PROCEDURE — 86850 RBC ANTIBODY SCREEN: CPT

## 2020-07-08 PROCEDURE — 80048 BASIC METABOLIC PNL TOTAL CA: CPT

## 2020-07-08 PROCEDURE — 97116 GAIT TRAINING THERAPY: CPT

## 2020-07-08 PROCEDURE — 83735 ASSAY OF MAGNESIUM: CPT

## 2020-07-08 PROCEDURE — 86901 BLOOD TYPING SEROLOGIC RH(D): CPT

## 2020-07-08 PROCEDURE — C1894: CPT

## 2020-07-08 PROCEDURE — 71045 X-RAY EXAM CHEST 1 VIEW: CPT

## 2020-07-08 RX ORDER — NYSTATIN CREAM 100000 [USP'U]/G
1 CREAM TOPICAL
Qty: 45 | Refills: 0
Start: 2020-07-08

## 2020-07-08 RX ORDER — CILOSTAZOL 100 MG/1
1 TABLET ORAL
Qty: 60 | Refills: 0
Start: 2020-07-08 | End: 2020-08-06

## 2020-07-08 RX ORDER — METOPROLOL TARTRATE 50 MG
1 TABLET ORAL
Qty: 30 | Refills: 0
Start: 2020-07-08 | End: 2020-08-06

## 2020-07-08 RX ORDER — KETOCONAZOLE 20 MG/G
1 AEROSOL, FOAM TOPICAL
Qty: 45 | Refills: 0
Start: 2020-07-08

## 2020-07-08 RX ORDER — NYSTATIN CREAM 100000 [USP'U]/G
1 CREAM TOPICAL
Refills: 0 | Status: DISCONTINUED | OUTPATIENT
Start: 2020-07-08 | End: 2020-07-08

## 2020-07-08 RX ORDER — FUROSEMIDE 40 MG
1 TABLET ORAL
Qty: 30 | Refills: 0
Start: 2020-07-08 | End: 2020-08-06

## 2020-07-08 RX ORDER — PHENYLEPHRINE-SHARK LIVER OIL-MINERAL OIL-PETROLATUM RECTAL OINTMENT
1 OINTMENT (GRAM) RECTAL
Qty: 45 | Refills: 0
Start: 2020-07-08

## 2020-07-08 RX ORDER — HYDROXYZINE HCL 10 MG
1 TABLET ORAL
Qty: 14 | Refills: 0
Start: 2020-07-08 | End: 2020-07-14

## 2020-07-08 RX ORDER — LOSARTAN POTASSIUM 100 MG/1
1 TABLET, FILM COATED ORAL
Qty: 30 | Refills: 0
Start: 2020-07-08 | End: 2020-08-06

## 2020-07-08 RX ORDER — POTASSIUM CHLORIDE 20 MEQ
1 PACKET (EA) ORAL
Qty: 30 | Refills: 0
Start: 2020-07-08 | End: 2020-08-06

## 2020-07-08 RX ORDER — LOSARTAN POTASSIUM 100 MG/1
50 TABLET, FILM COATED ORAL DAILY
Refills: 0 | Status: DISCONTINUED | OUTPATIENT
Start: 2020-07-08 | End: 2020-07-08

## 2020-07-08 RX ADMIN — LOSARTAN POTASSIUM 50 MILLIGRAM(S): 100 TABLET, FILM COATED ORAL at 12:36

## 2020-07-08 NOTE — PROGRESS NOTE ADULT - SUBJECTIVE AND OBJECTIVE BOX
CARDIOLOGY     PROGRESS  NOTE   ________________________________________________    CHIEF COMPLAINT:Patient is a 75y old  Male who presents with a chief complaint of worsening right foot swelling and pain (07 Jul 2020 00:39)  doing well, no complain.  	  REVIEW OF SYSTEMS:  CONSTITUTIONAL: No fever, weight loss, or fatigue  EYES: No eye pain, visual disturbances, or discharge  ENT:  No difficulty hearing, tinnitus, vertigo; No sinus or throat pain  NECK: No pain or stiffness  RESPIRATORY: No cough, wheezing, chills or hemoptysis; No Shortness of Breath  CARDIOVASCULAR: No chest pain, palpitations, passing out, dizziness, or leg swelling  GASTROINTESTINAL: No abdominal or epigastric pain. No nausea, vomiting, or hematemesis; No diarrhea or constipation. No melena or hematochezia.  GENITOURINARY: No dysuria, frequency, hematuria, or incontinence  NEUROLOGICAL: No headaches, memory loss, loss of strength, numbness, or tremors  SKIN: No itching, burning, rashes, or lesions   LYMPH Nodes: No enlarged glands  ENDOCRINE: No heat or cold intolerance; No hair loss  MUSCULOSKELETAL: No joint pain or swelling; No muscle, back, or extremity pain  PSYCHIATRIC: No depression, anxiety, mood swings, or difficulty sleeping  HEME/LYMPH: No easy bruising, or bleeding gums  ALLERGY AND IMMUNOLOGIC: No hives or eczema	    [ ] All others negative	  [ ] Unable to obtain    PHYSICAL EXAM:  T(C): 36.5 (07-08-20 @ 04:22), Max: 36.8 (07-07-20 @ 10:53)  HR: 81 (07-08-20 @ 04:22) (59 - 81)  BP: 168/68 (07-08-20 @ 04:22) (136/67 - 168/68)  RR: 18 (07-08-20 @ 04:22) (18 - 18)  SpO2: 95% (07-08-20 @ 04:22) (95% - 97%)  Wt(kg): --  I&O's Summary    06 Jul 2020 07:01  -  07 Jul 2020 07:00  --------------------------------------------------------  IN: 1740 mL / OUT: 2450 mL / NET: -710 mL    07 Jul 2020 07:01  -  08 Jul 2020 06:55  --------------------------------------------------------  IN: 1040 mL / OUT: 2350 mL / NET: -1310 mL        Appearance: Normal	  HEENT:   Normal oral mucosa, PERRL, EOMI	  Lymphatic: No lymphadenopathy  Cardiovascular: Normal S1 S2, No JVD, + murmurs, No edema  Respiratory: Lungs clear to auscultation	  Psychiatry: A & O x 3, Mood & affect appropriate  Gastrointestinal:  Soft, Non-tender, + BS	  Skin: No rashes, No ecchymoses, No cyanosis	  Neurologic: Non-focal  Extremities: Normal range of motion, +ulcer toe  Vascular+ pvd    MEDICATIONS  (STANDING):  aspirin enteric coated 81 milliGRAM(s) Oral daily  cilostazol 100 milliGRAM(s) Oral two times a day  clobetasol 0.05% Ointment 1 Application(s) Topical two times a day  furosemide    Tablet 20 milliGRAM(s) Oral daily  levothyroxine 137 MICROGram(s) Oral daily  losartan 25 milliGRAM(s) Oral daily  metoprolol succinate ER 50 milliGRAM(s) Oral daily  potassium chloride    Tablet ER 20 milliEquivalent(s) Oral daily  simvastatin 40 milliGRAM(s) Oral at bedtime      TELEMETRY: 	    ECG:  	  RADIOLOGY:  OTHER: 	  	  LABS:	 	    CARDIAC MARKERS:                                11.5   6.74  )-----------( 168      ( 07 Jul 2020 06:36 )             36.0     07-07    139  |  104  |  23  ----------------------------<  110<H>  4.8   |  25  |  0.81    Ca    9.6      07 Jul 2020 06:37  Phos  3.2     07-07  Mg     2.0     07-07      proBNP:   Lipid Profile:   HgA1c:   TSH: Thyroid Stimulating Hormone, Serum: 2.92 uIU/mL (06-30 @ 09:01)    PT/INR - ( 06 Jul 2020 12:20 )   PT: 13.0 sec;   INR: 1.14 ratio         PTT - ( 06 Jul 2020 12:20 )  PTT:40.5 sec    No pod sx intervention. Stable for d/c from podiatric standpoint. [facility] days of discharge. Please call [phone] to schedule an appointment.    Assessment and plan  ---------------------------  75 year old male PMH PPM, CAD s/p stent on ASA, prior A-fib not on AC, hypothyroid, and total left knee replacement p/w R foot swelling and pain. Patient states this has been worsening for past 1 year. Seen in ER 6/10 and started on Augmentin x10 days without improvement. Visiting nurse has been bathing foot w/ vinegar and warm water but discharge and bleeding has been increasing.   pt with hx of sss, ?paf , s/p ppm, cad, who presented to ER with possible cellulitis with LE edema  asa daily  abx as per MEDICINE  ppm interrogated runs of wct, +intermittent a.fib short burse  ac if no contraindication  le arterial doppler/ PVD  increase  beta blocker as tolerated  echo with normal ef, stress test ?mild ischemia  continue beta  blocker  add ace inhibitor or ARB as bp elevated increase dose  s/p angiogram no intervention  pdiatry noted

## 2020-07-08 NOTE — PROGRESS NOTE ADULT - PROVIDER SPECIALTY LIST ADULT
Cardiology
Gastroenterology
Gastroenterology
Infectious Disease
Infectious Disease
Internal Medicine
Podiatry
Vascular Surgery
Vascular Surgery
Internal Medicine
Internal Medicine
Vascular Surgery
Cardiology
Cardiology
Infectious Disease
Internal Medicine
Internal Medicine
Vascular Surgery

## 2020-07-08 NOTE — PROGRESS NOTE ADULT - ASSESSMENT
75 year old male         PMH PPM, CAD s/p stent on ASA, A-fib, hypothyroid, and total left knee replacement      p/w R foot swelling and pain.     Patient states this has been worsening for past 1 year.  on  augmentin,  with  no  improvement   Patient also notes rash across the upper torso/ back  for past week, pruritic on R arm, and rash in the groin         Right foot cellulits affecting toes./  inter  digital  uklcers ,, was  on   IV Zosyn.     Podiatry   saw  pt/  no  intervention/  foot x-rays negative for OM.      Lasix ,   for bilateral leg edema. . ha s impproved   LE venous dopplers, no  dvt      ASA   prior stent in 2007./   AFIB, PPM   h/o  AFIB,    no  a/c//  not  sure  why ?/    card  eval  Anemia,  gi  eval dr joann sanchez   interrogate  PPM/ if  pt  has  afib, as stored   event, , then  will need  to start  a/c/  pt  made  aware   of  risks  out  pt  card is  dr mikayla dick     echo,  normal  Ef  PPM  interrogation  with  WCT/   bursts  of  afib/   on lovenox  bid    stress  test   for  WCT  derm eval   noted,   dermatitis/ on   atarax/  lidex  cream  angio on   7/6.  small  vessel disease  per  podiatry     no  intervention  afib, on  eliquis  bid     start   d/c  planning    f/liz dick 75 year old male         PMH PPM, CAD s/p stent on ASA, A-fib, hypothyroid, and total left knee replacement      p/w R foot swelling and pain.     Patient states this has been worsening for past 1 year.  on  augmentin,  with  no  improvement   Patient also notes rash across the upper torso/ back  for past week, pruritic on R arm, and rash in the groin         Right foot cellulits affecting toes./  inter  digital  uklcers ,, was  on   IV Zosyn.     Podiatry   saw  pt/  no  intervention/  foot x-rays negative for OM.      Lasix ,   for bilateral leg edema. . ha s impproved   LE venous dopplers, no  dvt      ASA   prior stent in 2007./   AFIB, PPM   h/o  AFIB,    no  a/c//  not  sure  why ?/    card  eval  Anemia,  gi  eval dr joann sanchez   interrogate  PPM/ if  pt  has  afib, as stored   event, , then  will need  to start  a/c/  pt  made  aware   of  risks  out  pt  card is  dr mikayla dick     echo,  normal  Ef  PPM  interrogation  with  WCT/   s/p  brief   bursts  of  afib/      stress  test   for  WCT  derm eval   noted,   dermatitis/ on   atarax/  lidex  cream  angio on   7/6.  small  vessel disease  per  podiatry     no  intervention    s/p very   brief afib, per  crad  on asa. pletal     start   d/c  planning    f/p [facility]  dr mikayla dick

## 2020-07-08 NOTE — PROGRESS NOTE ADULT - SUBJECTIVE AND OBJECTIVE BOX
no  complaints  REVIEW OF SYSTEMS:  GEN: no fever,    no chills  RESP: no SOB,   no cough  CVS: no chest pain,   no palpitations  GI: no abdominal pain,   no nausea,   no vomiting,   no constipation,   no diarrhea  : no dysuria,   no frequency  NEURO: no headache,   no dizziness  PSYCH: no depression,   not anxious  Derm : no rash    MEDICATIONS  (STANDING):  aspirin enteric coated 81 milliGRAM(s) Oral daily  cilostazol 100 milliGRAM(s) Oral two times a day  clobetasol 0.05% Ointment 1 Application(s) Topical two times a day  furosemide    Tablet 20 milliGRAM(s) Oral daily  levothyroxine 137 MICROGram(s) Oral daily  losartan 50 milliGRAM(s) Oral daily  metoprolol succinate ER 50 milliGRAM(s) Oral daily  potassium chloride    Tablet ER 20 milliEquivalent(s) Oral daily  simvastatin 40 milliGRAM(s) Oral at bedtime    MEDICATIONS  (PRN):  hemorrhoidal Ointment 1 Application(s) Rectal three times a day PRN Itching/Burning.  hydrOXYzine hydrochloride 25 milliGRAM(s) Oral two times a day PRN Itching      Vital Signs Last 24 Hrs  T(C): 36.5 (08 Jul 2020 04:22), Max: 36.8 (07 Jul 2020 10:53)  T(F): 97.7 (08 Jul 2020 04:22), Max: 98.3 (07 Jul 2020 10:53)  HR: 81 (08 Jul 2020 04:22) (59 - 81)  BP: 168/68 (08 Jul 2020 04:22) (136/67 - 168/68)  BP(mean): --  RR: 18 (08 Jul 2020 04:22) (18 - 18)  SpO2: 95% (08 Jul 2020 04:22) (95% - 97%)  CAPILLARY BLOOD GLUCOSE        I&O's Summary    07 Jul 2020 07:01  -  08 Jul 2020 07:00  --------------------------------------------------------  IN: 1040 mL / OUT: 2350 mL / NET: -1310 mL        PHYSICAL EXAM:  HEAD:  Atraumatic, Normocephalic  NECK: Supple, No   JVD  CHEST/LUNG:   no     rales,     no,    rhonchi  HEART: Regular rate and rhythm;         murmur  ABDOMEN: Soft, Nontender, ;   EXTREMITIES:   dressing,  foot  NEUROLOGY:  alert    LABS:                        11.5   6.74  )-----------( 168      ( 07 Jul 2020 06:36 )             36.0     07-07    139  |  104  |  23  ----------------------------<  110<H>  4.8   |  25  |  0.81    Ca    9.6      07 Jul 2020 06:37  Phos  3.2     07-07  Mg     2.0     07-07      PT/INR - ( 06 Jul 2020 12:20 )   PT: 13.0 sec;   INR: 1.14 ratio         PTT - ( 06 Jul 2020 12:20 )  PTT:40.5 sec                Thyroid Stimulating Hormone, Serum: 2.92 uIU/mL (06-30 @ 09:01)          Consultant(s) Notes Reviewed:      Care Discussed with Consultants/Other Providers:

## 2020-07-08 NOTE — DISCHARGE NOTE NURSING/CASE MANAGEMENT/SOCIAL WORK - PATIENT PORTAL LINK FT
You can access the FollowMyHealth Patient Portal offered by [facility] by registering at the following website: [email or web address]. By joining [facility]’s FollowMyHealth portal, you will also be able to view your health information using other applications (apps) compatible with our system.

## 2020-08-27 NOTE — REASON FOR VISIT
Assessment and Plan:    Real Velazquez was seen today for follow-up  Diagnoses and all orders for this visit:    Mixed obsessional thoughts and acts    Attention deficit hyperactivity disorder (ADHD), combined type  -     guanFACINE HCl ER (Intuniv) 1 MG TB24; Take 1 tablet (1 mg total) by mouth daily  -     atoMOXetine (STRATTERA) 25 mg capsule; Take 1 capsule (25 mg total) by mouth daily    Thought disorder  -     atoMOXetine (STRATTERA) 25 mg capsule; Take 1 capsule (25 mg total) by mouth daily    Learning disabilities    Phonological disorder          Lázaro Gaines is a 6  y o  5  m o  male seen at [address] [place] [address] for follow up of medication management for OCD, anxiety and ADHD  He is also seen for learning difficulties, speech articulation difficulty and thought disorder  He has a history of [place] Sequence with micrognathia that is affecting eating and may be impacting quality of sleep  He is to go for surgery at [facility] for jaw extraction  1  We reviewed Juan Carlos's medications  He is to continue Zoloft 37 5mg at bedtime  Decrease Intuniv to 1mg at bedtime since he will be going for surgery and I would like him on a lower dose of this medication so there are less concerns for blood pressure changes  We increased his Strattera for ADHD and anxiety  Family is to call if there are any concerns with this change  Increase Strattera to 25 mg on September 4, 2020  Goal: improve attention and decrease anxious and depressed thoughts as well as assess for side effects with change of each medication  His mother  agreed to these changes  - He will have hybrid school with 2 days at school and 3 days home  It is a new school but some of his same friends    -Use emotional chart to help him recognize how he feels before school and after school     Use daily schedule on school and virtual days     -Recommend increasing counseling to twice a week through [facility] such as one zoom and one in school over the next few months to help with transition and coping with surgery for his jaw  A Letter with Information on Cognitive Behavioral Therapy will be sent to his mother in addition to contact information for [facility] that is a closer facility to the family  School recommendation:     Stephani Dumont is to take     Current Outpatient Medications:     [START ON 9/4/2020] atoMOXetine (STRATTERA) 25 mg capsule, Take 1 capsule (25 mg total) by mouth daily, Disp: 30 capsule, Rfl: 0    Melatonin 10 MG TABS, Take by mouth, Disp: , Rfl:     guanFACINE HCl ER (Intuniv) 1 MG TB24, Take 1 tablet (1 mg total) by mouth daily, Disp: 30 tablet, Rfl: 0    sertraline (ZOLOFT) 25 mg tablet, Take 1 5 tablets (37 5 mg total) by mouth daily At bedtime, Disp: 45 tablet, Rfl: 3  His medication  is being used for target symptoms of anxiety, inattention, impulsivity, hyperactivity, irritability and moodiness  We reviewed risks, benefits and side effects of medications, and that medicine works best in combination with educational and behavioral treatments  We reviewed FDA approval, black box status and risks of medicine interactions  After discussion of these issues, parent consented to the medication as noted  Wt Readings from Last 3 Encounters:   08/27/20 33 6 kg (74 lb) (73 %, Z= 0 62)*   02/28/20 32 4 kg (71 lb 6 4 oz) (77 %, Z= 0 73)*   01/10/20 31 8 kg (70 lb) (76 %, Z= 0 71)*     * Growth percentiles are based on CDC (Boys, 2-20 Years) data  BP Readings from Last 3 Encounters:   08/27/20 (!) 98/68 (40 %, Z = -0 26 /  75 %, Z = 0 67)*   02/28/20 (!) 90/62 (14 %, Z = -1 08 /  56 %, Z = 0 14)*   01/10/20 100/68 (53 %, Z = 0 06 /  78 %, Z = 0 78)*     *BP percentiles are based on the 2017 AAP Clinical Practice Guideline for boys     Pulse Readings from Last 3 Encounters:   08/27/20 88   02/28/20 88   01/10/20 90     Laboratory monitoring is not required  Follow-up Plan:?   1  We discussed the importance of routine follow-up for children taking medicine  This is to make sure medicine is still working and to monitor for side effects  2  I recommend follow-up   nurse visit in 2 months, provider visit in this clinic in 2/4/2021  ( he has surgery 10/9/2020  3  Our main office at [phone]  Refills: Please call 7-10 days before needing a refill  M*Modal software was used to dictate this note  It may contain errors with dictating incorrect words/spelling  Please contact provider directly for any questions  I have spent 50 minutes face to face with Patient and family today in which greater than 50% of this time was spent in counseling/coordination of care regarding Risks and benefits of tx options and Intructions for management discussing concerns and interventions  Chief Complaint: Medication follow up for OCD, anxiety and ADHD  There are concerns for increased stress for this school year  HPI:    Rick Thomson is a 6  y o  5  m o  male being seen for follow up of medication management in a child with  He is also seen for learning difficulties, speech articulation difficulty and thought disorder  He has a history of [place] Sequence with micrognathia that is affecting eating and may be impacting quality of sleep  Last visit with provider was 02/28/2020: At that time his Zoloft was increased to 37 5 mg  He was to continue his Strattera 18 mg and Intuniv 2 mg  Last year school behavior program had been using zones 1 to 3 on the wall it was recommended to move it to zones of color for self-regulation on his desk  He had been recommended that his teacher and Aleida Alcantara uses sticker object to elizabeth and he feels when he 1st gets to school and intermittently the day as well as increase times a frustration  We talked about using a token or point system  Examples were given      The history today is reported by patient and mother  Stressors:  Macrognathia with concerns for chewing and sleep:  He is to go for surgery at [facility] for jaw extraction  His mother took him for a 2nd opinion at [facility] where he had had his initial evaluation when he was a baby  He is scheduled for surgery in October  His mother is concerned that it is affecting his chewing and sleeping as we had discussed at prior visits  He continues to wake up in the middle the night and has difficulty going back to sleep  She has not heard any specific snoring or apnea but is not in the same room  There are times he seems tired during the day  He had gotten for sleep study but his mother reports that was not a complete study and was told there something related to the wiring  She is worried that he will have difficulty with the transition back to school since the is done in multiple parts  She does not know how he will cope with the pain  He may be exceptionally tolerant or exceptional pain/discomfort  In the past he has had different reactions and if he is uncomfortable it leads to increased mood difficulties  His mom signed him up for a group to talk to  She has been using online support  [employer]: his mother asked about holding him back but he will have some of the same children in his class this year  He also has had a counselor that he has talked to weekly by silverio through [address]  His mother has thought about more outpatient therapy  She feels he has been doing well with his counselor and has considered putting him in outpatient therapy  She will also consider psychiatrist if there in the same area  They have had difficulty in the past finding someone near them  Medication:  His mother states that he has been doing well on his medication for anxiety, ADHD and obsessive-compulsive thoughts and behaviors  He still has times that he will say "dark things when he is reprimanded"    She states that he is recognizing that these thoughts might be abnormal and now will ask is that "normal or wrong to think that?" or "why do I say that?"  They use code words to help him get back on track or recognize when he is doing something not okay  Medications prescribed through this office:     [START ON 9/4/2020] atoMOXetine (STRATTERA) 18 mg capsule, Take 1 capsule by mouth daily    guanFACINE HCl ER (Intuniv) 2 MG TB24, Take 1 tablet (1 mg total) by mouth  At night    sertraline (ZOLOFT) 25 mg tablet, Take 1 5 tablets (37 5 mg total) by mouth daily At bedtime,     Since his last visit, Joe Starch has been healthy  There have been no side effects of headache, appetite suppression, tics, constipation, palpitations and change in weight  School:  He is in 4th grade in general educaiton class with pull outs to learnign or emotional support class  Name of school: [address] [place] [address] district : [employer]    He has an [employer]   told his mother that he will be in a classroom with children that were in his class last year  He will be attending school to three days a week and home virtual school the other days  His mother will be his learning support  at home  While he was home during COVID-19 at the end of last school year he did get learning support through zoom calls in this with some what helpful  He will have a new learning support team and his mother is worried about his ability to adapt to them  The plan is he will continue his therapy sessions with the counselor ( Ms Irvin Heaton)  through [facility] for one hour once a week to work on coping strategies for behaviors and bullying  They use scenarios and social stories to learn  He is to continue with speech therapy and learning support for [employer] and [place]  There has been consideration for more supports for   He will be at after-school  on days he is at school  His mother thinks he needs more social interaction         Family did not bring in a copy of his most recent IEP from [address] [place] [address] Educational testing: unknown    Academics and most recent behavior rating scales:  IEP : has a new meeting spring 2020 and it is requested family send in new IEP    Teacher gricel in scanned report from 1/2020 in EMR and increased concern for inattention  Specialists:  Hearing and vision screens have been normal at [facility]  Dentist: [facility] dental extractions for dental abscess    Orthodontist- They wanted to do an expander  Mom lost insurance coverage but may see orthodontist verses seeing [facility]  Outpatient therapy: none     Behavioral services: none  We talked about cognitive behavior therapy in the past and family is now willing to consider it  Other Therapy/extracurricular activities: none        ROS:  General:  See HPI  Positives:  Continues to have difficulty with eating and has been seen by orthodontist at [facility] and will be doing a jaw extraction  He also has difficulty with sleeping  overall health good and growing well and has good strength and FROM of all extremities  Denies: gagging, nasal discharge and throat pain, cyanosis and exercise intolerance, cough, wheeze and difficulty breathing, constipation, diarrhea, vomiting and nausea, incontinence, rashes and lesions and seizures, tics and tremors   Pain:  None today      Current Outpatient Medications:     [START ON 9/4/2020] atoMOXetine (STRATTERA) 25 mg capsule, Take 1 capsule (25 mg total) by mouth daily, Disp: 30 capsule, Rfl: 0    Melatonin 10 MG TABS, Take by mouth, Disp: , Rfl:     guanFACINE HCl ER (Intuniv) 1 MG TB24, Take 1 tablet (1 mg total) by mouth daily, Disp: 30 tablet, Rfl: 0    sertraline (ZOLOFT) 25 mg tablet, Take 1 5 tablets (37 5 mg total) by mouth daily At bedtime, Disp: 45 tablet, Rfl: 3    Patient has no known allergies      Past Medical History:   Diagnosis Date    Attention deficit disorder     last assessed: 09/08/17  Attention deficit hyperactivity disorder (ADHD), combined type 11/8/2017    Congenital micrognathia 2011    Dental abscess     last assessed: 11/13/14    Difficult intubation     GERD (gastroesophageal reflux disease)     Learning disabilities 6/18/2018    Mandibular hypoplasia     Micrognathia     OCD (obsessive compulsive disorder) 7/20/2018    STEPHEN (obstructive sleep apnea)     Phonological disorder 7/20/2018   Doyle Neighbours sequence 2011    Arno Westbrook Rafi syndrome     Pyloric stenosis     Tick bite     last assessed: 06/29/15    Torticollis        Family History   Problem Relation Age of Onset    Anxiety disorder Mother     Depression Mother     Thyroid disease Mother     Rheum arthritis Mother         juvenile    Mental illness Mother     Hypertension Father     Mental illness Father     Personality disorder Sister         borderline    Bipolar disorder Sister     Mental illness Family     Personality disorder Paternal Grandmother        Social History     Socioeconomic History    Marital status: Unknown     Spouse name: Not on file    Number of children: Not on file    Years of education: Not on file    Highest education level: Not on file   Occupational History    Not on file   Social Needs    Financial resource strain: Not on file    Food insecurity     Worry: Not on file     Inability: Not on file    Transportation needs     Medical: Not on file     Non-medical: Not on file   Tobacco Use    Smoking status: Never Smoker    Smokeless tobacco: Never Used    Tobacco comment: no tobacco/smoke exposure   Substance and Sexual Activity    Alcohol use: Not on file    Drug use: Not on file    Sexual activity: Not on file   Lifestyle    Physical activity     Days per week: Not on file     Minutes per session: Not on file    Stress: Not on file   Relationships    Social connections     Talks on phone: Not on file     Gets together: Not on file     Attends Voodoo service: Not on file     Active member of club or organization: Not on file     Attends meetings of clubs or organizations: Not on file     Relationship status: Not on file    Intimate partner violence     Fear of current or ex partner: Not on file     Emotionally abused: Not on file     Physically abused: Not on file     Forced sexual activity: Not on file   Other Topics Concern    Not on file   Social History Narrative    Academic/educational problem    Cat, Dog    Dental care, regularly    Lives with parents (), Guevara Parveen, who works in accounting and has college ed, and deric [employer] a  with 12th grade ed  Also 2 sisters, Gabby Dugan, age 25 and Stephen Parker, age 15, plus great, great aunt reside in home  [id number] school year    Childcare/School: Name: [address] [place] [address] ( new this year), Grade: 4th, School District: [place], [place]: Jessica bee have an IEP  [place] [employer]  they had a conversation with the principal and his group of children will     He will be at school 2 or 3 ryan a week  ( hybrid, momwill be with him on days he is home)     LD did zoom calls  btu will have new learning support team          Therapy sessions 1x per week through the [facility] about behaviors, bullying etc           Ms Michelle [facility]no[facility] session 1 hour zoom individual  It has been good and they work on scenarios and social stories  they started to work on more services for    after school on days he is at school  For play and down time              Physical Exam:    Vitals:    08/27/20 1655   BP: (!) 98/68   BP Location: Right arm   Patient Position: Sitting   Cuff Size: Child   Pulse: 88   Resp: 20   Weight: 33 6 kg (74 lb)   Height: 4' 6 96" (1 396 m)   HC: 55 4 cm (21 81")       General:  overall healthy and well nourished,   HEENT: atraumatic, EOMI and PERRLA,   Cardiovascular:  RRR and no murmurs, rubs, gallops,  Lungs:  CTA and good aeration to the bases bilaterally,   Gastrointestinal:  soft, NT/ND and good BS   Skin: No  rash and no lesions,   Musculoskeletal:  FROM, 4/4 strength upper extremities and 4/4 strength lower extremities   Neurologic:  CN intact in general and gait heel toe no spasticity, tremor, tic, abnormal movements, nystagmus and asymmetric movement     Behavior Observations in clinic:    Energy level:  He was able to stop and answer questions but needed some extra redirection to turn his iPad over after hitting pause  Fidgety: Yes  but still better compared to prior visits  Conversation:  He was able to answer and ask questions about home and school  He states he does not have any worries today  He was able to answer questions about things he needs to do when he goes to a new school including recognize where his home room is, know where the counselor is if he is having a bad day and find where the school nurse's  Is also important for him to know where  the bathrooms is  Eye contact:  He continues to use appropriate eye contact to engage in monitor interactions in the room    Interaction with parent:  Comfortable  Interaction with examiner:  Good rapport  Ability to complete tasks given: n/a  Oppositional behaviors: No [Consultation] : a consultation visit [FreeTextEntry1] : right ankle venous stasis ulceration

## 2020-09-22 PROBLEM — E03.9 HYPOTHYROIDISM, UNSPECIFIED: Chronic | Status: ACTIVE | Noted: 2020-06-29

## 2020-09-22 PROBLEM — Z95.0 PRESENCE OF CARDIAC PACEMAKER: Chronic | Status: ACTIVE | Noted: 2020-06-29

## 2020-10-13 ENCOUNTER — APPOINTMENT (OUTPATIENT)
Dept: SURGERY | Facility: CLINIC | Age: 75
End: 2020-10-13
Payer: MEDICARE

## 2020-10-13 VITALS
SYSTOLIC BLOOD PRESSURE: 134 MMHG | DIASTOLIC BLOOD PRESSURE: 68 MMHG | BODY MASS INDEX: 36.88 KG/M2 | HEART RATE: 60 BPM | HEIGHT: 67 IN | WEIGHT: 235 LBS

## 2020-10-13 DIAGNOSIS — R59.1 GENERALIZED ENLARGED LYMPH NODES: ICD-10-CM

## 2020-10-13 PROCEDURE — 36415 COLL VENOUS BLD VENIPUNCTURE: CPT

## 2020-10-13 PROCEDURE — 99203 OFFICE O/P NEW LOW 30 MIN: CPT

## 2020-10-13 NOTE — PHYSICAL EXAM
[de-identified] : no palpable thyroid nodules [Laryngoscopy Performed] : laryngoscopy was performed, see procedure section for findings [Midline] : located in midline position [Normal] : orientation to person, place, and time: normal [de-identified] : indirect  laryngoscopy shows normal vocal cord mobility bilaterally with no lesions noted

## 2020-10-13 NOTE — REASON FOR VISIT
[Initial Consultation] : an initial consultation for [Spouse] : spouse [FreeTextEntry2] : Lymphadenopathy

## 2020-10-13 NOTE — HISTORY OF PRESENT ILLNESS
[de-identified] : Pt c/o lymphadenopathy found on CT scan.   denies night sweats,  cough, SOB, fever, skin cancer excision or dysphagia or hoarseness. \par CT scan:   Lymph node  pelvis\par PET:   No FDG avid lymphadenopathy;   multiple shotty cervical, axillary retroperitoneal and pelvis lymphadenopathy

## 2020-10-13 NOTE — CONSULT LETTER
[Dear  ___] : Dear  [unfilled], [Consult Letter:] : I had the pleasure of evaluating your patient, [unfilled]. [Please see my note below.] : Please see my note below. [Consult Closing:] : Thank you very much for allowing me to participate in the care of this patient.  If you have any questions, please do not hesitate to contact me. [Sincerely,] : Sincerely, [FreeTextEntry2] : Dr. Roni Lainez [FreeTextEntry3] : Rudolph Yung MD, FACS\par System Director, Endocrine Surgery\par [facility]\par Assistant Clinical Professor of Surgery\par [facility] [employer] [facility]

## 2020-10-14 LAB
T3 SERPL-MCNC: 90 NG/DL
T4 FREE SERPL-MCNC: 1.6 NG/DL
THYROGLOB AB SERPL-ACNC: 171 IU/ML
THYROPEROXIDASE AB SERPL IA-ACNC: 22.9 IU/ML
TSH SERPL-ACNC: 1.84 UIU/ML

## 2020-11-23 ENCOUNTER — OUTPATIENT (OUTPATIENT)
Dept: OUTPATIENT SERVICES | Facility: HOSPITAL | Age: 75
LOS: 1 days | End: 2020-11-23
Payer: MEDICARE

## 2020-11-23 VITALS
OXYGEN SATURATION: 97 % | RESPIRATION RATE: 16 BRPM | DIASTOLIC BLOOD PRESSURE: 66 MMHG | HEART RATE: 60 BPM | TEMPERATURE: 97 F | HEIGHT: 64 IN | WEIGHT: 240.08 LBS | SYSTOLIC BLOOD PRESSURE: 140 MMHG

## 2020-11-23 DIAGNOSIS — Z96.652 PRESENCE OF LEFT ARTIFICIAL KNEE JOINT: Chronic | ICD-10-CM

## 2020-11-23 DIAGNOSIS — Z98.890 OTHER SPECIFIED POSTPROCEDURAL STATES: Chronic | ICD-10-CM

## 2020-11-23 DIAGNOSIS — E03.9 HYPOTHYROIDISM, UNSPECIFIED: ICD-10-CM

## 2020-11-23 DIAGNOSIS — I10 ESSENTIAL (PRIMARY) HYPERTENSION: ICD-10-CM

## 2020-11-23 DIAGNOSIS — Z95.5 PRESENCE OF CORONARY ANGIOPLASTY IMPLANT AND GRAFT: Chronic | ICD-10-CM

## 2020-11-23 DIAGNOSIS — R59.0 LOCALIZED ENLARGED LYMPH NODES: ICD-10-CM

## 2020-11-23 DIAGNOSIS — I25.10 ATHEROSCLEROTIC HEART DISEASE OF NATIVE CORONARY ARTERY WITHOUT ANGINA PECTORIS: ICD-10-CM

## 2020-11-23 DIAGNOSIS — Z95.0 PRESENCE OF CARDIAC PACEMAKER: Chronic | ICD-10-CM

## 2020-11-23 DIAGNOSIS — Z98.49 CATARACT EXTRACTION STATUS, UNSPECIFIED EYE: Chronic | ICD-10-CM

## 2020-11-23 DIAGNOSIS — Z95.0 PRESENCE OF CARDIAC PACEMAKER: ICD-10-CM

## 2020-11-23 LAB
ANION GAP SERPL CALC-SCNC: 10 MMO/L — SIGNIFICANT CHANGE UP (ref 7–14)
BUN SERPL-MCNC: 33 MG/DL — HIGH (ref 7–23)
CALCIUM SERPL-MCNC: 9.3 MG/DL — SIGNIFICANT CHANGE UP (ref 8.4–10.5)
CHLORIDE SERPL-SCNC: 108 MMOL/L — HIGH (ref 98–107)
CO2 SERPL-SCNC: 25 MMOL/L — SIGNIFICANT CHANGE UP (ref 22–31)
CREAT SERPL-MCNC: 1.14 MG/DL — SIGNIFICANT CHANGE UP (ref 0.5–1.3)
GLUCOSE SERPL-MCNC: 102 MG/DL — HIGH (ref 70–99)
HCT VFR BLD CALC: 33.5 % — LOW (ref 39–50)
HGB BLD-MCNC: 10.7 G/DL — LOW (ref 13–17)
MCHC RBC-ENTMCNC: 30.4 PG — SIGNIFICANT CHANGE UP (ref 27–34)
MCHC RBC-ENTMCNC: 31.9 % — LOW (ref 32–36)
MCV RBC AUTO: 95.2 FL — SIGNIFICANT CHANGE UP (ref 80–100)
NRBC # FLD: 0 K/UL — SIGNIFICANT CHANGE UP (ref 0–0)
PLATELET # BLD AUTO: 137 K/UL — LOW (ref 150–400)
PMV BLD: 10.7 FL — SIGNIFICANT CHANGE UP (ref 7–13)
POTASSIUM SERPL-MCNC: 4.3 MMOL/L — SIGNIFICANT CHANGE UP (ref 3.5–5.3)
POTASSIUM SERPL-SCNC: 4.3 MMOL/L — SIGNIFICANT CHANGE UP (ref 3.5–5.3)
RBC # BLD: 3.52 M/UL — LOW (ref 4.2–5.8)
RBC # FLD: 13.4 % — SIGNIFICANT CHANGE UP (ref 10.3–14.5)
SODIUM SERPL-SCNC: 143 MMOL/L — SIGNIFICANT CHANGE UP (ref 135–145)
WBC # BLD: 4.63 K/UL — SIGNIFICANT CHANGE UP (ref 3.8–10.5)
WBC # FLD AUTO: 4.63 K/UL — SIGNIFICANT CHANGE UP (ref 3.8–10.5)

## 2020-11-23 PROCEDURE — 93010 ELECTROCARDIOGRAM REPORT: CPT

## 2020-11-23 RX ORDER — SODIUM CHLORIDE 9 MG/ML
3 INJECTION INTRAMUSCULAR; INTRAVENOUS; SUBCUTANEOUS EVERY 8 HOURS
Refills: 0 | Status: DISCONTINUED | OUTPATIENT
Start: 2020-11-30 | End: 2020-12-14

## 2020-11-23 RX ORDER — FEXOFENADINE HCL 30 MG
1 TABLET ORAL
Qty: 0 | Refills: 0 | DISCHARGE

## 2020-11-23 RX ORDER — ASPIRIN/CALCIUM CARB/MAGNESIUM 324 MG
1 TABLET ORAL
Qty: 0 | Refills: 0 | DISCHARGE

## 2020-11-23 RX ORDER — CHOLECALCIFEROL (VITAMIN D3) 125 MCG
1 CAPSULE ORAL
Qty: 0 | Refills: 0 | DISCHARGE

## 2020-11-23 RX ORDER — LEVOTHYROXINE SODIUM 125 MCG
1 TABLET ORAL
Qty: 0 | Refills: 0 | DISCHARGE

## 2020-11-23 RX ORDER — EZETIMIBE AND SIMVASTATIN 10; 80 MG/1; MG/1
1 TABLET, FILM COATED ORAL
Qty: 0 | Refills: 0 | DISCHARGE

## 2020-11-23 RX ORDER — SODIUM CHLORIDE 9 MG/ML
1000 INJECTION, SOLUTION INTRAVENOUS
Refills: 0 | Status: DISCONTINUED | OUTPATIENT
Start: 2020-11-30 | End: 2020-12-14

## 2020-11-23 RX ORDER — ASCORBIC ACID 60 MG
1 TABLET,CHEWABLE ORAL
Qty: 0 | Refills: 0 | DISCHARGE

## 2020-11-23 NOTE — H&P PST ADULT - NSICDXPASTMEDICALHX_GEN_ALL_CORE_FT
PAST MEDICAL HISTORY:  Afib not on anticoagulation    CAD (coronary artery disease)     H/O asbestosis     H/O fracture of hip 2017    Hypothyroid     Pacemaker     Varicose vein of leg      PAST MEDICAL HISTORY:  Afib not on anticoagulation    CAD (coronary artery disease)     H/O asbestosis     H/O fracture of hip 2017    HTN (hypertension)     Hypothyroid     Pacemaker     Varicose vein of leg

## 2020-11-23 NOTE — H&P PST ADULT - HISTORY OF PRESENT ILLNESS
75 year old male with lymphadenopathy noted on CT scan. Pt presents today for presurgical evaluation for ... 75 year old male with lymphadenopathy noted on CT scan. Pt presents today for presurgical evaluation for Lymph Node Biopsy of Right Groin.

## 2020-11-23 NOTE — H&P PST ADULT - NSICDXPASTSURGICALHX_GEN_ALL_CORE_FT
PAST SURGICAL HISTORY:  H/O detached retina repair 1952    H/O foot surgery for infection of right foot 2019    H/O varicose vein stripping 1993 left leg    History of hip surgery left hip ORIF    History of left knee replacement 2013 - multiple infections post op requirng reoperation in 2015, 2017, 2019)    Pacemaker Medtronic - Model RVDR01 1/10/2012    S/P cataract surgery     Stented coronary artery drug eluding stent 5/2007     PAST SURGICAL HISTORY:  H/O detached retina repair 1952    H/O foot surgery for infection of right foot 2019    H/O inguinal hernia repair right 1993    H/O varicose vein stripping 1993 left leg    History of hip surgery left hip ORIF    History of left knee replacement 2013 - multiple infections post op requirng reoperation in 2015, 2017, 2019)    Pacemaker Medtronic - Model RVDR01 1/10/2012    S/P cataract surgery     Stented coronary artery drug eluding stent 5/2007     PAST SURGICAL HISTORY:  H/O detached retina repair 1952    H/O foot surgery for infection of right foot 2019    H/O inguinal hernia repair right 1993    H/O varicose vein stripping 1993 left leg    History of hip surgery left hip ORIF    History of left knee replacement 2013 - multiple infections post op requiring reoperation in 2015, 2017, 2019)    Pacemaker Medtronic - Model RVDR01 1/10/2012    S/P cataract surgery     Stented coronary artery drug eluding stent 5/2007

## 2020-11-23 NOTE — H&P PST ADULT - ITE SK HX ROS MEA POS PC
reports redness to groin area bilaterally - states he used cream prescribed by dermatologist reports redness to groin area bilaterally - states he uses antifungal cream prescribed by dermatologist

## 2020-11-23 NOTE — H&P PST ADULT - MUSCULOSKELETAL COMMENTS
states chronic swelling of legs, states left leg is shorter, states he cannot bend his left knee swelling of bilateral lower extremities, cannot bend left knee

## 2020-11-23 NOTE — H&P PST ADULT - NSICDXPROBLEM_GEN_ALL_CORE_FT
PROBLEM DIAGNOSES  Problem: Localized enlarged lymph nodes  Assessment and Plan: Pt scheduled for surgery on 11/30/2020.  Pre-op instructions provided. Pt verbalized understanding.   Pepcid provided for GI prophylaxis.   Pt given detailed verbal and written instructions on chlorhexidine wash. Pt verbalized understanding with teachback.   Email sent to surgeon regarding mild erythema on pt's groin.   Pt states he is already scheduled for preop COVID testing.   OR booking notified of [facility] precautions and of implants: hardware in left hip, latha in left leg.    Problem: CAD (coronary atherosclerotic disease)  Assessment and Plan: Pt advised to remain on Aspirin due to stent (pt advised to f/u with PMD regarding cilostazol).   Copy of last stress and echo in chart.  Pt is going for med/cardiac evaluation per surgeon's request - requested copy.     Problem: Pacemaker  Assessment and Plan: OR booking notified.     Problem: HTN (hypertension)  Assessment and Plan: Pt instructed to take his Metoprolol and Losartan the morning of surgery.     Problem: Hypothyroidism  Assessment and Plan: Pt instructed to take his synthroid the morning of surgery.

## 2020-11-26 DIAGNOSIS — Z01.818 ENCOUNTER FOR OTHER PREPROCEDURAL EXAMINATION: ICD-10-CM

## 2020-11-27 ENCOUNTER — APPOINTMENT (OUTPATIENT)
Dept: DISASTER EMERGENCY | Facility: CLINIC | Age: 75
End: 2020-11-27

## 2020-11-27 NOTE — ASU PATIENT PROFILE, ADULT - PATIENT'S HEIGHT AND WEIGHT RECORDED IN THE VITAL SIGNS FLOWSHEET
PRINCIPAL DISCHARGE DIAGNOSIS  Diagnosis: Altered mental status, unspecified  Assessment and Plan of Treatment: yes

## 2020-11-27 NOTE — ASU PATIENT PROFILE, ADULT - --DESCRIBE SURGICAL SITE
right ankle wound - redness and swelling pt states that it periodically bleeds and oozes - + 3 lower extremity edema

## 2020-11-27 NOTE — ASU PATIENT PROFILE, ADULT - PSH
H/O detached retina repair  1952  H/O foot surgery  for infection of right foot 2019  H/O inguinal hernia repair  right 1993  H/O varicose vein stripping  1993 left leg  History of hip surgery  left hip ORIF  History of left knee replacement  2013 - multiple infections post op requiring reoperation in 2015, 2017, 2019)  Pacemaker  Medtronic - Model RVDR01 1/10/2012  S/P cataract surgery    Stented coronary artery  drug eluding stent 5/2007

## 2020-11-27 NOTE — ASU PATIENT PROFILE, ADULT - PMH
Afib  not on anticoagulation  CAD (coronary artery disease)    H/O asbestosis    H/O fracture of hip  2017  HTN (hypertension)    Hypothyroid    Pacemaker    Varicose vein of leg

## 2020-11-28 LAB — SARS-COV-2 N GENE NPH QL NAA+PROBE: NOT DETECTED

## 2020-11-29 ENCOUNTER — TRANSCRIPTION ENCOUNTER (OUTPATIENT)
Age: 75
End: 2020-11-29

## 2020-11-30 ENCOUNTER — RESULT REVIEW (OUTPATIENT)
Age: 75
End: 2020-11-30

## 2020-11-30 ENCOUNTER — OUTPATIENT (OUTPATIENT)
Dept: OUTPATIENT SERVICES | Facility: HOSPITAL | Age: 75
LOS: 1 days | Discharge: HOME | End: 2020-11-30
Payer: MEDICARE

## 2020-11-30 VITALS
RESPIRATION RATE: 17 BRPM | TEMPERATURE: 98 F | WEIGHT: 240.08 LBS | DIASTOLIC BLOOD PRESSURE: 55 MMHG | HEIGHT: 64 IN | SYSTOLIC BLOOD PRESSURE: 131 MMHG | OXYGEN SATURATION: 97 % | HEART RATE: 60 BPM

## 2020-11-30 VITALS
OXYGEN SATURATION: 95 % | SYSTOLIC BLOOD PRESSURE: 114 MMHG | HEART RATE: 65 BPM | RESPIRATION RATE: 15 BRPM | DIASTOLIC BLOOD PRESSURE: 51 MMHG

## 2020-11-30 DIAGNOSIS — Z98.890 OTHER SPECIFIED POSTPROCEDURAL STATES: Chronic | ICD-10-CM

## 2020-11-30 DIAGNOSIS — Z95.0 PRESENCE OF CARDIAC PACEMAKER: Chronic | ICD-10-CM

## 2020-11-30 DIAGNOSIS — R59.0 LOCALIZED ENLARGED LYMPH NODES: ICD-10-CM

## 2020-11-30 DIAGNOSIS — Z98.49 CATARACT EXTRACTION STATUS, UNSPECIFIED EYE: Chronic | ICD-10-CM

## 2020-11-30 DIAGNOSIS — Z96.652 PRESENCE OF LEFT ARTIFICIAL KNEE JOINT: Chronic | ICD-10-CM

## 2020-11-30 DIAGNOSIS — Z95.5 PRESENCE OF CORONARY ANGIOPLASTY IMPLANT AND GRAFT: Chronic | ICD-10-CM

## 2020-11-30 PROCEDURE — 88360 TUMOR IMMUNOHISTOCHEM/MANUAL: CPT | Mod: 26

## 2020-11-30 PROCEDURE — 88364 INSITU HYBRIDIZATION (FISH): CPT | Mod: 26

## 2020-11-30 PROCEDURE — 88333 PATH CONSLTJ SURG CYTO XM 1: CPT | Mod: 26

## 2020-11-30 PROCEDURE — 88367 INSITU HYBRIDIZATION AUTO: CPT | Mod: 26

## 2020-11-30 PROCEDURE — 88307 TISSUE EXAM BY PATHOLOGIST: CPT | Mod: 26

## 2020-11-30 PROCEDURE — 88365 INSITU HYBRIDIZATION (FISH): CPT | Mod: 26,59

## 2020-11-30 PROCEDURE — 88342 IMHCHEM/IMCYTCHM 1ST ANTB: CPT | Mod: 26,59

## 2020-11-30 PROCEDURE — 88341 IMHCHEM/IMCYTCHM EA ADD ANTB: CPT | Mod: 26,59

## 2020-11-30 RX ORDER — OXYCODONE HYDROCHLORIDE 5 MG/1
5 TABLET ORAL ONCE
Refills: 0 | Status: DISCONTINUED | OUTPATIENT
Start: 2020-11-30 | End: 2020-11-30

## 2020-11-30 RX ORDER — FENTANYL CITRATE 50 UG/ML
25 INJECTION INTRAVENOUS
Refills: 0 | Status: DISCONTINUED | OUTPATIENT
Start: 2020-11-30 | End: 2020-11-30

## 2020-11-30 RX ORDER — LEVOTHYROXINE SODIUM 125 MCG
1 TABLET ORAL
Qty: 0 | Refills: 0 | DISCHARGE

## 2020-11-30 RX ORDER — EZETIMIBE AND SIMVASTATIN 10; 80 MG/1; MG/1
1 TABLET, FILM COATED ORAL
Qty: 0 | Refills: 0 | DISCHARGE

## 2020-11-30 RX ORDER — ASPIRIN/CALCIUM CARB/MAGNESIUM 324 MG
1 TABLET ORAL
Qty: 0 | Refills: 0 | DISCHARGE

## 2020-11-30 RX ORDER — ONDANSETRON 8 MG/1
4 TABLET, FILM COATED ORAL ONCE
Refills: 0 | Status: DISCONTINUED | OUTPATIENT
Start: 2020-11-30 | End: 2020-12-14

## 2020-11-30 RX ORDER — CHOLECALCIFEROL (VITAMIN D3) 125 MCG
1 CAPSULE ORAL
Qty: 0 | Refills: 0 | DISCHARGE

## 2020-11-30 NOTE — ASU DISCHARGE PLAN (ADULT/PEDIATRIC) - NURSING INSTRUCTIONS
You were given 1000mg IV Tylenol for pain management.  Please DO NOT take any Tylenol containing products, such as  Vicodin, Percocet, Excedrin, many cold preparations for the next 6 hours (until 350p).  DO NOT EXCEED 3000MG OF TYLENOL OVER 24 HOURS. After showering pat dry steri strips.  Do Not rub them.  They will curl up and fall off by themselves within 7 days.

## 2020-11-30 NOTE — ASU DISCHARGE PLAN (ADULT/PEDIATRIC) - CARE PROVIDER_API CALL
Gregorio Weir  COLON/RECTAL SURGERY  [address], [address]  [place] [address]  Phone: [phone]  Fax: [phone]  Follow Up Time: 1 week

## 2020-11-30 NOTE — ASU DISCHARGE PLAN (ADULT/PEDIATRIC) - CALL YOUR DOCTOR IF YOU HAVE ANY OF THE FOLLOWING:
Wound/Surgical Site with redness, or foul smelling discharge or pus Bleeding that does not stop/Fever greater than (need to indicate Fahrenheit or Celsius)/Pain not relieved by Medications/Wound/Surgical Site with redness, or foul smelling discharge or pus

## 2020-11-30 NOTE — ASU DISCHARGE PLAN (ADULT/PEDIATRIC) - ASU DC SPECIAL INSTRUCTIONSFT
Please follow up with Dr. Weir in 1-2 weeks.  Do not remove steri strips. You may shower tomorrow. Allow soap and water to wash over incision site and pat dry.  Keep surgical site clean and dry.  Take prescribed pain medication as needed.  No heavy lifting for 2 weeks.

## 2020-11-30 NOTE — BRIEF OPERATIVE NOTE - NSICDXBRIEFPROCEDURE_GEN_ALL_CORE_FT
PROCEDURES:  Biopsy, lymph node, inguinal, open 30-Nov-2020 10:19:48 right, excisional Keely Johnson

## 2020-11-30 NOTE — ASU PREOP CHECKLIST - COMMENTS
Synthroid, 81 mg ASA, metoprolol, famotidine, Losartan Potassium at 4:00 a.m. with a small sip of water

## 2020-11-30 NOTE — ASU DISCHARGE PLAN (ADULT/PEDIATRIC) - PAIN MANAGEMENT
Prescriptions electronically submitted to pharmacy from [facility] You received intravenous tylenol in the operating room at 9:50am. You may take additional tylenol products after 3:50pm./Prescriptions electronically submitted to pharmacy from [facility]

## 2020-12-02 LAB — TM INTERPRETATION: SIGNIFICANT CHANGE UP

## 2020-12-04 LAB — HEMATOPATHOLOGY REPORT: SIGNIFICANT CHANGE UP

## 2021-03-09 PROBLEM — Z87.81 PERSONAL HISTORY OF (HEALED) TRAUMATIC FRACTURE: Chronic | Status: ACTIVE | Noted: 2020-11-23

## 2021-03-09 PROBLEM — Z87.09 PERSONAL HISTORY OF OTHER DISEASES OF THE RESPIRATORY SYSTEM: Chronic | Status: ACTIVE | Noted: 2020-11-23

## 2021-03-09 PROBLEM — I83.90 ASYMPTOMATIC VARICOSE VEINS OF UNSPECIFIED LOWER EXTREMITY: Chronic | Status: ACTIVE | Noted: 2018-12-04

## 2021-03-09 PROBLEM — I48.91 UNSPECIFIED ATRIAL FIBRILLATION: Chronic | Status: ACTIVE | Noted: 2018-12-04

## 2021-03-09 PROBLEM — I10 ESSENTIAL (PRIMARY) HYPERTENSION: Chronic | Status: ACTIVE | Noted: 2020-11-23

## 2021-03-22 ENCOUNTER — APPOINTMENT (OUTPATIENT)
Dept: OPHTHALMOLOGY | Facility: CLINIC | Age: 76
End: 2021-03-22
Payer: MEDICARE

## 2021-03-22 ENCOUNTER — NON-APPOINTMENT (OUTPATIENT)
Age: 76
End: 2021-03-22

## 2021-03-22 PROCEDURE — 92014 COMPRE OPH EXAM EST PT 1/>: CPT

## 2021-04-20 ENCOUNTER — APPOINTMENT (OUTPATIENT)
Dept: ORTHOPEDIC SURGERY | Facility: CLINIC | Age: 76
End: 2021-04-20
Payer: MEDICARE

## 2021-04-27 ENCOUNTER — APPOINTMENT (OUTPATIENT)
Dept: ORTHOPEDIC SURGERY | Facility: CLINIC | Age: 76
End: 2021-04-27
Payer: MEDICARE

## 2021-04-27 DIAGNOSIS — S52.532A COLLES' FRACTURE OF LEFT RADIUS, INITIAL ENCOUNTER FOR CLOSED FRACTURE: ICD-10-CM

## 2021-04-27 DIAGNOSIS — S52.509A UNSPECIFIED FRACTURE OF THE LOWER END OF UNSPECIFIED RADIUS, INITIAL ENCOUNTER FOR CLOSED FRACTURE: ICD-10-CM

## 2021-04-27 DIAGNOSIS — L03.90 CELLULITIS, UNSPECIFIED: ICD-10-CM

## 2021-04-27 DIAGNOSIS — G56.02 CARPAL TUNNEL SYNDROME, LEFT UPPER LIMB: ICD-10-CM

## 2021-04-27 PROCEDURE — 99204 OFFICE O/P NEW MOD 45 MIN: CPT

## 2021-04-27 PROCEDURE — 73590 X-RAY EXAM OF LOWER LEG: CPT | Mod: LT

## 2021-04-27 PROCEDURE — 73110 X-RAY EXAM OF WRIST: CPT | Mod: LT

## 2021-04-27 NOTE — DISCUSSION/SUMMARY
[de-identified] : The underlying pathophysiology was reviewed in great detail with the patient as well as the various treatment options, including ice, analgesics, NSAIDs, Physical therapy, steroid injections.\par \par A wrist brace was fit and applied in clinic today. Discussed use for immobilization and stabilization of the wrist fracture. Discussed continued use at night time for treatment of carpal tunnel. \par \par A prescription for Physical Therapy was provided.\par \par Patient was started on Augmentin today for cellulitis of the left lower leg. \par I advised f/u with his surgeon if the left leg pain and swelling continues.\par \par Activity modifications and restrictions were discussed.\par \par FU 4 weeks for repeat xrays of the left wrist. \par \par All questions were answered, all alternatives discussed and the patient is in complete agreement with that plan. Follow-up appointment as instructed. Any issues and the patient will call or come in sooner.

## 2021-04-27 NOTE — HISTORY OF PRESENT ILLNESS
[de-identified] : Mr. JANEE ARCEO  is a 75 year  presenting to the office complaining of left hand pain.  Patient reports pain began after a fall in which he braced his fall with his left hand in February 2021. Patient notes worsening pain over time. Patient denies injury or trauma to the area. The patient describes the pain as a dull aching, and occasionally sharp pain localized to the palmar aspect of his  left hand that is intermittent in nature.  His symptoms are exacerbated  with any movement of the wrist, hand, and gripping of the hand. Patient reports the pain is not waking him   up at night.  Patient reports associated weakness. Patient does reports some numbness and tingling in the upper extremity, specifically the middle fingers of the left hand. \par He is also reports left lower leg pain. He  presents to the office ambulating with two canes, he usually uses one for ambulation. Patient reports pain began on 04/26/23021. Denies injury or trauma to the area.  The patient describes the pain as a dull aching, and occasionally sharp pain localized to the anterior aspect of his distal 1/3 shin that is intermittent in nature. His   symptoms are exacerbated with weightbearing and touching his leg  Pain is alleviated with rest. Of note, patient had a left knee fusion in 2019 at [facility] for chronic knee infection. Patient notes he last saw his doctor in March 2021 and had xrays revealing no acute changes. \par \par Patient is taking tylenol for pain relief with mild to moderate relief in symptoms. \par Patient denies any other complaints at this time.

## 2021-05-13 ENCOUNTER — APPOINTMENT (OUTPATIENT)
Dept: ENDOCRINOLOGY | Facility: CLINIC | Age: 76
End: 2021-05-13
Payer: MEDICARE

## 2021-05-13 DIAGNOSIS — E03.9 HYPOTHYROIDISM, UNSPECIFIED: ICD-10-CM

## 2021-05-13 PROCEDURE — 99214 OFFICE O/P EST MOD 30 MIN: CPT | Mod: 25

## 2021-05-13 PROCEDURE — 36415 COLL VENOUS BLD VENIPUNCTURE: CPT

## 2021-05-13 NOTE — ASSESSMENT
[FreeTextEntry1] : bloods drawn. to call next week for results. continued f/u with PCP.  patient has been given the opportunity to ask questions, and all of the patient's questions have been answered to their satisfaction\par

## 2021-05-13 NOTE — HISTORY OF PRESENT ILLNESS
[de-identified] : prior evaluation of hypothyroidism. denies dysphagia, hoarseness or new lesions.  no changes medically since last visit. feels well on Synthroid 137 mcg daily. recent sonogram stable. I have reviewed all old and new data and available images.

## 2021-05-13 NOTE — PHYSICAL EXAM
[de-identified] : no palpable thyroid nodules [Laryngoscopy Performed] : laryngoscopy was performed, see procedure section for findings [Midline] : located in midline position [Normal] : orientation to person, place, and time: normal

## 2021-05-15 LAB
T3 SERPL-MCNC: 87 NG/DL
T4 FREE SERPL-MCNC: 1.6 NG/DL
THYROGLOB AB SERPL-ACNC: 271 IU/ML
THYROPEROXIDASE AB SERPL IA-ACNC: 24.8 IU/ML
TSH SERPL-ACNC: 2.16 UIU/ML

## 2021-05-18 ENCOUNTER — NON-APPOINTMENT (OUTPATIENT)
Age: 76
End: 2021-05-18

## 2021-08-18 ENCOUNTER — INPATIENT (INPATIENT)
Facility: HOSPITAL | Age: 76
LOS: 28 days | Discharge: NURSING, REHAB OR LONG-TERM CARE | DRG: 216 | End: 2021-09-16
Attending: THORACIC SURGERY (CARDIOTHORACIC VASCULAR SURGERY) | Admitting: INTERNAL MEDICINE
Payer: MEDICARE

## 2021-08-18 VITALS
TEMPERATURE: 98 F | HEIGHT: 67 IN | HEART RATE: 66 BPM | WEIGHT: 248.02 LBS | OXYGEN SATURATION: 96 % | RESPIRATION RATE: 18 BRPM | SYSTOLIC BLOOD PRESSURE: 114 MMHG | DIASTOLIC BLOOD PRESSURE: 65 MMHG

## 2021-08-18 DIAGNOSIS — Z96.652 PRESENCE OF LEFT ARTIFICIAL KNEE JOINT: Chronic | ICD-10-CM

## 2021-08-18 DIAGNOSIS — Z98.890 OTHER SPECIFIED POSTPROCEDURAL STATES: Chronic | ICD-10-CM

## 2021-08-18 DIAGNOSIS — Z98.49 CATARACT EXTRACTION STATUS, UNSPECIFIED EYE: Chronic | ICD-10-CM

## 2021-08-18 DIAGNOSIS — Z95.0 PRESENCE OF CARDIAC PACEMAKER: Chronic | ICD-10-CM

## 2021-08-18 DIAGNOSIS — Z95.5 PRESENCE OF CORONARY ANGIOPLASTY IMPLANT AND GRAFT: Chronic | ICD-10-CM

## 2021-08-18 DIAGNOSIS — I50.23 ACUTE ON CHRONIC SYSTOLIC (CONGESTIVE) HEART FAILURE: ICD-10-CM

## 2021-08-18 LAB
ALBUMIN SERPL ELPH-MCNC: 3.6 G/DL — SIGNIFICANT CHANGE UP (ref 3.3–5)
ALP SERPL-CCNC: 101 U/L — SIGNIFICANT CHANGE UP (ref 40–120)
ALT FLD-CCNC: 12 U/L — SIGNIFICANT CHANGE UP (ref 10–45)
ANION GAP SERPL CALC-SCNC: 9 MMOL/L — SIGNIFICANT CHANGE UP (ref 5–17)
APTT BLD: 30.1 SEC — SIGNIFICANT CHANGE UP (ref 27.5–35.5)
AST SERPL-CCNC: 17 U/L — SIGNIFICANT CHANGE UP (ref 10–40)
BASOPHILS # BLD AUTO: 0.03 K/UL — SIGNIFICANT CHANGE UP (ref 0–0.2)
BASOPHILS NFR BLD AUTO: 0.6 % — SIGNIFICANT CHANGE UP (ref 0–2)
BILIRUB SERPL-MCNC: 0.5 MG/DL — SIGNIFICANT CHANGE UP (ref 0.2–1.2)
BUN SERPL-MCNC: 29 MG/DL — HIGH (ref 7–23)
CALCIUM SERPL-MCNC: 9.1 MG/DL — SIGNIFICANT CHANGE UP (ref 8.4–10.5)
CHLORIDE SERPL-SCNC: 107 MMOL/L — SIGNIFICANT CHANGE UP (ref 96–108)
CO2 SERPL-SCNC: 20 MMOL/L — LOW (ref 22–31)
CREAT SERPL-MCNC: 1.18 MG/DL — SIGNIFICANT CHANGE UP (ref 0.5–1.3)
EOSINOPHIL # BLD AUTO: 0.28 K/UL — SIGNIFICANT CHANGE UP (ref 0–0.5)
EOSINOPHIL NFR BLD AUTO: 5.6 % — SIGNIFICANT CHANGE UP (ref 0–6)
GLUCOSE SERPL-MCNC: 97 MG/DL — SIGNIFICANT CHANGE UP (ref 70–99)
HCT VFR BLD CALC: 32 % — LOW (ref 39–50)
HGB BLD-MCNC: 10 G/DL — LOW (ref 13–17)
IMM GRANULOCYTES NFR BLD AUTO: 0.2 % — SIGNIFICANT CHANGE UP (ref 0–1.5)
INR BLD: 1.03 RATIO — SIGNIFICANT CHANGE UP (ref 0.88–1.16)
LYMPHOCYTES # BLD AUTO: 1.41 K/UL — SIGNIFICANT CHANGE UP (ref 1–3.3)
LYMPHOCYTES # BLD AUTO: 28.3 % — SIGNIFICANT CHANGE UP (ref 13–44)
MCHC RBC-ENTMCNC: 29.5 PG — SIGNIFICANT CHANGE UP (ref 27–34)
MCHC RBC-ENTMCNC: 31.3 GM/DL — LOW (ref 32–36)
MCV RBC AUTO: 94.4 FL — SIGNIFICANT CHANGE UP (ref 80–100)
MONOCYTES # BLD AUTO: 0.63 K/UL — SIGNIFICANT CHANGE UP (ref 0–0.9)
MONOCYTES NFR BLD AUTO: 12.6 % — SIGNIFICANT CHANGE UP (ref 2–14)
NEUTROPHILS # BLD AUTO: 2.63 K/UL — SIGNIFICANT CHANGE UP (ref 1.8–7.4)
NEUTROPHILS NFR BLD AUTO: 52.7 % — SIGNIFICANT CHANGE UP (ref 43–77)
NRBC # BLD: 0 /100 WBCS — SIGNIFICANT CHANGE UP (ref 0–0)
NT-PROBNP SERPL-SCNC: 1881 PG/ML — HIGH (ref 0–300)
PLATELET # BLD AUTO: 148 K/UL — LOW (ref 150–400)
POTASSIUM SERPL-MCNC: 4.2 MMOL/L — SIGNIFICANT CHANGE UP (ref 3.5–5.3)
POTASSIUM SERPL-SCNC: 4.2 MMOL/L — SIGNIFICANT CHANGE UP (ref 3.5–5.3)
PROT SERPL-MCNC: 6.8 G/DL — SIGNIFICANT CHANGE UP (ref 6–8.3)
PROTHROM AB SERPL-ACNC: 12.3 SEC — SIGNIFICANT CHANGE UP (ref 10.6–13.6)
RBC # BLD: 3.39 M/UL — LOW (ref 4.2–5.8)
RBC # FLD: 14.6 % — HIGH (ref 10.3–14.5)
SARS-COV-2 RNA SPEC QL NAA+PROBE: SIGNIFICANT CHANGE UP
SODIUM SERPL-SCNC: 136 MMOL/L — SIGNIFICANT CHANGE UP (ref 135–145)
TROPONIN T, HIGH SENSITIVITY RESULT: 20 NG/L — SIGNIFICANT CHANGE UP (ref 0–51)
WBC # BLD: 4.99 K/UL — SIGNIFICANT CHANGE UP (ref 3.8–10.5)
WBC # FLD AUTO: 4.99 K/UL — SIGNIFICANT CHANGE UP (ref 3.8–10.5)

## 2021-08-18 PROCEDURE — 99284 EMERGENCY DEPT VISIT MOD MDM: CPT | Mod: GC

## 2021-08-18 PROCEDURE — 71046 X-RAY EXAM CHEST 2 VIEWS: CPT | Mod: 26

## 2021-08-18 PROCEDURE — 72110 X-RAY EXAM L-2 SPINE 4/>VWS: CPT | Mod: 26

## 2021-08-18 RX ORDER — FUROSEMIDE 40 MG
40 TABLET ORAL ONCE
Refills: 0 | Status: COMPLETED | OUTPATIENT
Start: 2021-08-18 | End: 2021-08-18

## 2021-08-18 RX ORDER — ASPIRIN/CALCIUM CARB/MAGNESIUM 324 MG
81 TABLET ORAL DAILY
Refills: 0 | Status: DISCONTINUED | OUTPATIENT
Start: 2021-08-18 | End: 2021-08-30

## 2021-08-18 RX ORDER — OXYCODONE AND ACETAMINOPHEN 5; 325 MG/1; MG/1
1 TABLET ORAL EVERY 6 HOURS
Refills: 0 | Status: DISCONTINUED | OUTPATIENT
Start: 2021-08-18 | End: 2021-08-18

## 2021-08-18 RX ORDER — LEVOTHYROXINE SODIUM 125 MCG
137 TABLET ORAL DAILY
Refills: 0 | Status: DISCONTINUED | OUTPATIENT
Start: 2021-08-18 | End: 2021-08-30

## 2021-08-18 RX ORDER — LOSARTAN POTASSIUM 100 MG/1
25 TABLET, FILM COATED ORAL DAILY
Refills: 0 | Status: DISCONTINUED | OUTPATIENT
Start: 2021-08-18 | End: 2021-08-21

## 2021-08-18 RX ORDER — LIDOCAINE 4 G/100G
2 CREAM TOPICAL ONCE
Refills: 0 | Status: COMPLETED | OUTPATIENT
Start: 2021-08-18 | End: 2021-08-18

## 2021-08-18 RX ORDER — METOPROLOL TARTRATE 50 MG
50 TABLET ORAL DAILY
Refills: 0 | Status: DISCONTINUED | OUTPATIENT
Start: 2021-08-18 | End: 2021-08-30

## 2021-08-18 RX ORDER — POTASSIUM CHLORIDE 20 MEQ
20 PACKET (EA) ORAL DAILY
Refills: 0 | Status: DISCONTINUED | OUTPATIENT
Start: 2021-08-18 | End: 2021-08-30

## 2021-08-18 RX ORDER — HEPARIN SODIUM 5000 [USP'U]/ML
5000 INJECTION INTRAVENOUS; SUBCUTANEOUS EVERY 12 HOURS
Refills: 0 | Status: DISCONTINUED | OUTPATIENT
Start: 2021-08-18 | End: 2021-08-30

## 2021-08-18 RX ORDER — LOSARTAN POTASSIUM 100 MG/1
50 TABLET, FILM COATED ORAL DAILY
Refills: 0 | Status: DISCONTINUED | OUTPATIENT
Start: 2021-08-18 | End: 2021-08-18

## 2021-08-18 RX ORDER — FUROSEMIDE 40 MG
60 TABLET ORAL
Refills: 0 | Status: DISCONTINUED | OUTPATIENT
Start: 2021-08-18 | End: 2021-08-19

## 2021-08-18 RX ORDER — METOPROLOL TARTRATE 50 MG
100 TABLET ORAL DAILY
Refills: 0 | Status: DISCONTINUED | OUTPATIENT
Start: 2021-08-18 | End: 2021-08-18

## 2021-08-18 RX ORDER — CILOSTAZOL 100 MG/1
100 TABLET ORAL
Refills: 0 | Status: DISCONTINUED | OUTPATIENT
Start: 2021-08-18 | End: 2021-08-25

## 2021-08-18 RX ADMIN — Medication 40 MILLIGRAM(S): at 18:07

## 2021-08-18 NOTE — ED ADULT NURSE NOTE - CHPI ED NUR SYMPTOMS NEG
no chest pain/no chills/no congestion/no diaphoresis/no dizziness/no fever/no nausea/no syncope/no vomiting

## 2021-08-18 NOTE — ED PROVIDER NOTE - NS ED ROS FT
Denies fever, chills, cough, chest pain, nausea, vomiting, abdominal pain, incontinence, numbness, tingling, weakness, falls,

## 2021-08-18 NOTE — H&P ADULT - NSHPPHYSICALEXAM_GEN_ALL_CORE
PHYSICAL EXAMINATION:  Vital Signs Last 24 Hrs  T(C): 36.7 (18 Aug 2021 15:11), Max: 36.7 (18 Aug 2021 15:11)  T(F): 98 (18 Aug 2021 15:11), Max: 98 (18 Aug 2021 15:11)  HR: 66 (18 Aug 2021 15:11) (66 - 66)  BP: 114/65 (18 Aug 2021 15:11) (114/65 - 114/65)  BP(mean): --  RR: 18 (18 Aug 2021 15:11) (18 - 18)  SpO2: 96% (18 Aug 2021 15:11) (96% - 96%)  CAPILLARY BLOOD GLUCOSE            GENERAL: NAD, well-groomed,  HEAD:  atraumatic, normocephalic  EYES: sclera anicteric  ENMT: mucous membranes moist  NECK: supple, No JVD  CHEST/LUNG: clear to auscultation bilaterally;    no      rales   ,   no rhonchi,   HEART: normal S1, S2  ABDOMEN: BS+, soft, ND, NT   EXTREMITIES:     edema    b/l LEs/ left leg lymphedema/ redness  , right leg  NEURO: awake, ,     moves all extremities  SKIN: no     rash

## 2021-08-18 NOTE — H&P ADULT - HISTORY OF PRESENT ILLNESS
77 yo M    h/o  HTN   PPM, CAD.  LYMPHEDEMA. HTN      pw low back pain and SOB.      Patient states his low back pain began 5 days ago. He denies any trauma or falls.     States the pain was across his entire lower back. Denies  leg   numbness, tingling, leg weakness, urinary or bowl incontinence.      He states the pain has improved but states he is still using his walker to walk instead of his cane.     He states of  the past 4 days he has also noticed DUNCAN. States he is only able to walk short distances with becoming dyspnea  and has  leg swelling.     He denies fever, chills, cough, chest pain, orthopnea, PND.      He states he did not take his lasix for the past few days because it makes him urinate too much.

## 2021-08-18 NOTE — ED PROVIDER NOTE - OBJECTIVE STATEMENT
77 yo M pw low back pain and SOB. Patient states his low back pain began 5 days ago. He denies any trauma or falls. States the pain was across his entire lower back. Denies genital or lower extremity numbness, tingling, leg weakness, urinary or bowl incontinence. He states the pain has improved but states he is still using his walker to walk instead of his cane. He states of  the past 4 days he has also noticed DUNCAN. States he is only able to walk short distances with becoming dyspneic. He endorses leg swelling. He denies fever, chills, cough, chest pain, orthopnea, PND. He states he did not take his lasix for the past few days because it makes him urinate too much.

## 2021-08-18 NOTE — ED PROVIDER NOTE - CLINICAL SUMMARY MEDICAL DECISION MAKING FREE TEXT BOX
Patient presenting with a shortness of breath a few missed doses of his lasix. Symptoms concerning for CHF vs ACS vs less likely PE. Will obtain Trop, BNP, CMP, Dimer. Will obtain CXR to evaluate for pulm edema. No red flag symptoms or midline back pain so no concern for fracture or cord compression.

## 2021-08-18 NOTE — H&P ADULT - ASSESSMENT
76  year old male          h/o   , CAD s/p stent on ASA, A-fib/ PPM, , hypothyroid, and total left knee replacement       s/p rash across the upper torso/ back  for past week, pruritic on R arm, and rash in the groin     PPM  interrogation  with  WCT/   s/p  brief   bursts  of  afib/ , on prior visit         admitted with sob,  from acutr diastolic  chf, relate d to non complaince  with his  lasix    iv lasix   wound care/  lymphedema       CAD/ AFIb  /PPM,/ HTN     prior stent in 2007   on  on asa.  yoptol/ cozaar/ lipitor  h/o  AFIB,    no  a/c     Anemia, , hb stable, had  been seen [place]  gi  eval [place]   right leg with distal redness.  not cellulitis / c/c  for past few  months  an d left leg lymphedema        f/p wih  dr mikayla dick

## 2021-08-18 NOTE — H&P ADULT - NSHPLABSRESULTS_GEN_ALL_CORE
LABS:                        10.0   4.99  )-----------( 148      ( 18 Aug 2021 16:10 )             32.0     08-18    136  |  107  |  29<H>  ----------------------------<  97  4.2   |  20<L>  |  1.18    Ca    9.1      18 Aug 2021 16:10    TPro  6.8  /  Alb  3.6  /  TBili  0.5  /  DBili  x   /  AST  17  /  ALT  12  /  AlkPhos  101  08-18    PT/INR - ( 18 Aug 2021 16:10 )   PT: 12.3 sec;   INR: 1.03 ratio         PTT - ( 18 Aug 2021 16:10 )  PTT:30.1 sec

## 2021-08-18 NOTE — ED PROVIDER NOTE - NSICDXPASTMEDICALHX_GEN_ALL_CORE_FT
PAST MEDICAL HISTORY:  Afib not on anticoagulation    CAD (coronary artery disease)     H/O asbestosis     H/O fracture of hip 2017    HTN (hypertension)     Hypothyroid     Pacemaker     Varicose vein of leg

## 2021-08-18 NOTE — ED PROVIDER NOTE - ATTENDING CONTRIBUTION TO CARE
76 M w/ hx of HTN, sent in for eval for poassible acs/PE, pt was seen by PCP earlier today, he reports sob w/ DUNCAN has missed his lasix. Pt w/ leg swelling has bilateral unna boots on the lower limbs, he has swelling to themid thigh and reports increasing difficulty w/ walking to the weight of the legs, states that his back is hurting due to that. No falls, no saddle anethesia no urinary/fecal incontinence, b/l lower sacral pain, 4/5 lower extremity strength 5/5 upper extremity strength, Pt w/ soft abdomen diminshed breath sounds at the bases concerned sx are related to CHF exacerbation will send ddimer to eval for possible PE, though less likely, Will give iv diuresis and admit

## 2021-08-18 NOTE — ED PROVIDER NOTE - PHYSICAL EXAMINATION
GENERAL: AAOx4, GCS 15, NAD, WDWN;   HEENT: MMM, supple neck, EOMI, nonicteric sclera;   PULM: CTAB, no crackles/rubs/rales;   CV: RRR, S1S2, no MRG; bilateral 2+ LE swelling   ABD: Flat abdomen, NTND, no R/G/R  BACK: No midline spinal tenderness, no paraspinal tenderness   MSK: HOLMAN, +2 pulses x4;    NEURO: No obvious focal deficits; Sensation intact to gross touch in bilateral LE

## 2021-08-18 NOTE — ED PROVIDER NOTE - NSICDXPASTSURGICALHX_GEN_ALL_CORE_FT
PAST SURGICAL HISTORY:  H/O detached retina repair 1952    H/O foot surgery for infection of right foot 2019    H/O inguinal hernia repair right 1993    H/O varicose vein stripping 1993 left leg    History of hip surgery left hip ORIF    History of left knee replacement 2013 - multiple infections post op requiring reoperation in 2015, 2017, 2019)    Pacemaker Medtronic - Model RVDR01 1/10/2012    S/P cataract surgery     Stented coronary artery drug eluding stent 5/2007

## 2021-08-18 NOTE — ED ADULT NURSE NOTE - OBJECTIVE STATEMENT
77 yo M pmh of CHF, CAD, HTN, hypothyroidism, lymphedema brought in via EMS to ED from PCP for r/o PE.  As per EMS, PCP concerned for PE due to SOB on exertion.  Pt states that he has not taken his lasix in "a few days".  Pt was seen by MD yesterday and had left legged wrapped due to lymphedema. Pt endorses bilateral lower back pain. Denies CP, fevers/chills, n/v/d, lightheadedness, dizziness, change sin urinary or bowel habits.  A&Ox4, placed on CM, EKG performed.  VSS.  Skin w/d/i.  +3 pitting edema noted BLE, with noted redness.  Safety and comfort maintained.  SO present at bedside.  Will continue to monitor.

## 2021-08-19 LAB
ANION GAP SERPL CALC-SCNC: 9 MMOL/L — SIGNIFICANT CHANGE UP (ref 5–17)
BUN SERPL-MCNC: 28 MG/DL — HIGH (ref 7–23)
CALCIUM SERPL-MCNC: 9 MG/DL — SIGNIFICANT CHANGE UP (ref 8.4–10.5)
CHLORIDE SERPL-SCNC: 108 MMOL/L — SIGNIFICANT CHANGE UP (ref 96–108)
CO2 SERPL-SCNC: 25 MMOL/L — SIGNIFICANT CHANGE UP (ref 22–31)
COVID-19 SPIKE DOMAIN AB INTERP: POSITIVE
COVID-19 SPIKE DOMAIN ANTIBODY RESULT: 201 U/ML — HIGH
CREAT SERPL-MCNC: 1.18 MG/DL — SIGNIFICANT CHANGE UP (ref 0.5–1.3)
GLUCOSE SERPL-MCNC: 101 MG/DL — HIGH (ref 70–99)
HCT VFR BLD CALC: 30.3 % — LOW (ref 39–50)
HGB BLD-MCNC: 9.4 G/DL — LOW (ref 13–17)
MCHC RBC-ENTMCNC: 29.2 PG — SIGNIFICANT CHANGE UP (ref 27–34)
MCHC RBC-ENTMCNC: 31 GM/DL — LOW (ref 32–36)
MCV RBC AUTO: 94.1 FL — SIGNIFICANT CHANGE UP (ref 80–100)
NRBC # BLD: 0 /100 WBCS — SIGNIFICANT CHANGE UP (ref 0–0)
PLATELET # BLD AUTO: 144 K/UL — LOW (ref 150–400)
POTASSIUM SERPL-MCNC: 4.3 MMOL/L — SIGNIFICANT CHANGE UP (ref 3.5–5.3)
POTASSIUM SERPL-SCNC: 4.3 MMOL/L — SIGNIFICANT CHANGE UP (ref 3.5–5.3)
RBC # BLD: 3.22 M/UL — LOW (ref 4.2–5.8)
RBC # FLD: 14.6 % — HIGH (ref 10.3–14.5)
SARS-COV-2 IGG+IGM SERPL QL IA: 201 U/ML — HIGH
SARS-COV-2 IGG+IGM SERPL QL IA: POSITIVE
SODIUM SERPL-SCNC: 142 MMOL/L — SIGNIFICANT CHANGE UP (ref 135–145)
WBC # BLD: 4.61 K/UL — SIGNIFICANT CHANGE UP (ref 3.8–10.5)
WBC # FLD AUTO: 4.61 K/UL — SIGNIFICANT CHANGE UP (ref 3.8–10.5)

## 2021-08-19 PROCEDURE — 99221 1ST HOSP IP/OBS SF/LOW 40: CPT

## 2021-08-19 PROCEDURE — 93306 TTE W/DOPPLER COMPLETE: CPT | Mod: 26

## 2021-08-19 RX ORDER — FUROSEMIDE 40 MG
40 TABLET ORAL DAILY
Refills: 0 | Status: DISCONTINUED | OUTPATIENT
Start: 2021-08-19 | End: 2021-08-22

## 2021-08-19 RX ADMIN — Medication 137 MICROGRAM(S): at 05:44

## 2021-08-19 RX ADMIN — Medication 60 MILLIGRAM(S): at 05:43

## 2021-08-19 RX ADMIN — HEPARIN SODIUM 5000 UNIT(S): 5000 INJECTION INTRAVENOUS; SUBCUTANEOUS at 19:00

## 2021-08-19 RX ADMIN — Medication 81 MILLIGRAM(S): at 13:21

## 2021-08-19 RX ADMIN — CILOSTAZOL 100 MILLIGRAM(S): 100 TABLET ORAL at 21:07

## 2021-08-19 RX ADMIN — Medication 20 MILLIEQUIVALENT(S): at 13:21

## 2021-08-19 RX ADMIN — CILOSTAZOL 100 MILLIGRAM(S): 100 TABLET ORAL at 05:43

## 2021-08-19 RX ADMIN — LOSARTAN POTASSIUM 25 MILLIGRAM(S): 100 TABLET, FILM COATED ORAL at 05:44

## 2021-08-19 RX ADMIN — HEPARIN SODIUM 5000 UNIT(S): 5000 INJECTION INTRAVENOUS; SUBCUTANEOUS at 05:43

## 2021-08-19 RX ADMIN — Medication 50 MILLIGRAM(S): at 05:44

## 2021-08-19 NOTE — CONSULT NOTE ADULT - SUBJECTIVE AND OBJECTIVE BOX
CHIEF COMPLAINT:Patient is a 76y old  Male who presents with a chief complaint of sob (18 Aug 2021 19:37)      HPI:  77 yo  h/o  HT PPM, CAD.  LYMPHEDEMA. HTN  pw low back pain and SOB.      Patient states his low back pain began 5 days ago. He denies any trauma or falls. States the pain was across his entire lower back. Denies  leg   numbness, tingling, leg weakness, urinary or bowl incontinence.      He states the pain has improved but states he is still using his walker to walk instead of his cane.     He states of  the past 4 days he has also noticed DUNCAN. States he is only able to walk short distances with becoming dyspnea  and has  leg swelling.     He denies fever, chills, cough, chest pain, orthopnea, PND.      He states he did not take his lasix for the past few days because it makes him urinate too much.       PAST MEDICAL & SURGICAL HISTORY:  Afib  not on anticoagulation    CAD (coronary artery disease)    Varicose vein of leg    Hypothyroid    Pacemaker    H/O fracture of hip  2017    H/O asbestosis    HTN (hypertension)    Pacemaker  Medtronic - Model RVDR01 1/10/2012    Stented coronary artery  drug eluding stent 5/2007    H/O detached retina repair  1952    S/P cataract surgery    History of hip surgery  left hip ORIF    H/O varicose vein stripping  1993 left leg    History of left knee replacement  2013 - multiple infections post op requiring reoperation in 2015, 2017, 2019)    H/O foot surgery  for infection of right foot 2019    H/O inguinal hernia repair  right 1993        MEDICATIONS  (STANDING):  aspirin enteric coated 81 milliGRAM(s) Oral daily  cilostazol 100 milliGRAM(s) Oral two times a day  furosemide   Injectable 60 milliGRAM(s) IV Push two times a day  heparin   Injectable 5000 Unit(s) SubCutaneous every 12 hours  levothyroxine 137 MICROGram(s) Oral daily  losartan 25 milliGRAM(s) Oral daily  metoprolol succinate ER 50 milliGRAM(s) Oral daily  potassium chloride    Tablet ER 20 milliEquivalent(s) Oral daily    MEDICATIONS  (PRN):  oxycodone    5 mG/acetaminophen 325 mG 1 Tablet(s) Oral every 6 hours PRN Moderate Pain (4 - 6)      FAMILY HISTORY:  FH: skin cancer (Father)        SOCIAL HISTORY:    [ ] Non-smoker  [ ] Smoker  [ ] Alcohol    Allergies    alfuzosin (Hives)  levofloxacin (Hives)  Myrbetriq (Hives)  tamsulosin (Hives)    Intolerances    	    REVIEW OF SYSTEMS:  CONSTITUTIONAL: No fever, weight loss, or fatigue  EYES: No eye pain, visual disturbances, or discharge  ENT:  No difficulty hearing, tinnitus, vertigo; No sinus or throat pain  NECK: No pain or stiffness  RESPIRATORY: No cough, wheezing, chills or hemoptysis; + Shortness of Breath  CARDIOVASCULAR: No chest pain, palpitations, passing out, dizziness, or leg swelling  GASTROINTESTINAL: No abdominal or epigastric pain. No nausea, vomiting, or hematemesis; No diarrhea or constipation. No melena or hematochezia.  GENITOURINARY: No dysuria, frequency, hematuria, or incontinence  NEUROLOGICAL: No headaches, memory loss, loss of strength, numbness, or tremors  SKIN: No itching, burning, rashes, or lesions   LYMPH Nodes: No enlarged glands  ENDOCRINE: No heat or cold intolerance; No hair loss  MUSCULOSKELETAL: No joint pain or swelling; No muscle, back, or extremity pain  PSYCHIATRIC: No depression, anxiety, mood swings, or difficulty sleeping  HEME/LYMPH: No easy bruising, or bleeding gums  ALLERGY AND IMMUNOLOGIC: No hives or eczema	    [ ] All others negative	  [ ] Unable to obtain    PHYSICAL EXAM:  T(C): 36.9 (08-19-21 @ 08:11), Max: 36.9 (08-18-21 @ 20:30)  HR: 50 (08-19-21 @ 08:11) (50 - 68)  BP: 128/68 (08-19-21 @ 08:11) (114/65 - 128/68)  RR: 18 (08-19-21 @ 08:11) (18 - 19)  SpO2: 96% (08-19-21 @ 08:11) (96% - 97%)  Wt(kg): --  I&O's Summary      Appearance: Normal	  HEENT:   Normal oral mucosa, PERRL, EOMI	  Lymphatic: No lymphadenopathy  Cardiovascular: Normal S1 S2, No JVD, No murmurs, No edema  Respiratory: Lungs clear to auscultation	  Psychiatry: A & O x 3, Mood & affect appropriate  Gastrointestinal:  Soft, Non-tender, + BS	  Skin: No rashes, No ecchymoses, No cyanosis	  Neurologic: Non-focal  Extremities: Normal range of motion, No clubbing, cyanosis or edema  Vascular: Peripheral pulses palpable 2+ bilaterally    TELEMETRY: 	    ECG:  	  RADIOLOGY:  OTHER: 	  	  LABS:	 	    CARDIAC MARKERS:                              9.4    4.61  )-----------( 144      ( 19 Aug 2021 05:45 )             30.3     08-19    142  |  108  |  28<H>  ----------------------------<  101<H>  4.3   |  25  |  1.18    Ca    9.0      19 Aug 2021 05:47    TPro  6.8  /  Alb  3.6  /  TBili  0.5  /  DBili  x   /  AST  17  /  ALT  12  /  AlkPhos  101  08-18    proBNP: Serum Pro-Brain Natriuretic Peptide: 1881 pg/mL (08-18 @ 16:10)    Lipid Profile:   HgA1c:   TSH:   PT/INR - ( 18 Aug 2021 16:10 )   PT: 12.3 sec;   INR: 1.03 ratio         PTT - ( 18 Aug 2021 16:10 )  PTT:30.1 sec    PREVIOUS DIAGNOSTIC TESTING:     < from: 12 Lead ECG (11.23.20 @ 10:37) >  Diagnosis Line Electronic atrial pacemaker  Minimal voltage criteria for LVH, may be normal variant  Borderline ECG    < from: Transthoracic Echocardiogram (06.30.20 @ 15:42) >  1. Mitral annular calcification, otherwise normal mitral  valve. Moderate mitral regurgitation.Eccentric anteriorly  directed jet.  2. Calcified trileaflet aortic valve with normal opening.  Peak transaortic valve gradient equals 15 mm Hg, aortic  valve velocity time integral equals 38 cm, estimated aortic  valve area equals 3.1 sqcm. Mild aortic regurgitation.  3. Severely dilated left atrium.  LA volume index = 49  cc/m2.  4. Eccentric left ventricular hypertrophy (dilated left  ventricle with normal relative wall thickness).  5. Endocardium not well visualized; grossly normal left  ventricular systolic function. Regional wall motion could  not be accurately assessed.  6. The right ventricle is not well visualized; grossly  normal right ventricular systolic function. TV s' = 21  cm/sec.  7. Estimated right ventricular systolic pressure equals 36  mm Hg, assuming right atrial pressure equals 8 mm Hg,  consistent with borderline pulmonary hypertension.  < from: Xray Chest 2 Views PA/Lat (08.18.21 @ 16:35) >  Enlarged cardiac silhouette.    Findings suggesting mild pulmonary vascular congestion on the right.    < end of copied text >

## 2021-08-19 NOTE — CONSULT NOTE ADULT - ASSESSMENT
A/P:76  year old male w/ PMH/o CAD s/p stent on ASA, A-fib/ PPM, hypothyroid, s/p total left knee replacement admitted w/ rash across the upper torso/ back  for past week, pruritic on R arm, and rash in the groin.     Wound Consult requested to assist w/ management of BLE lymphedema w/ Venous insufficiency  Prophylaxis measures      Compression BLE  Consider RLE/ foot XRay to evaluated for chronic Rt 2nd toe wound      and Duplex to r/o DVT  BLE elevation  Abx per Medicine  Moisturize intact skin w/ SWEEN cream BID  Nutrition Consult for optimization        consider MVI & Vit C to promote wound healing        encourage high quality protein  Continue turning and position  w/ offloading assistive devices as per protocol  Continue w/ Pericare as per protocol  Waffle Cushion to chair when oob to chair  Continue w/ low air loss  bed surface   Care as per medicine, will follow w/ you  Upon discharge f/u as outpatient at [facility] [address] [facility] [phone]  Seen w/ attng and D/w team  Thank you for this consult  Coco Lozada PA-C [id number]

## 2021-08-19 NOTE — PROGRESS NOTE ADULT - ASSESSMENT
76  year old male          h/o   , CAD s/p stent on ASA, A-fib/ PPM, , hypothyroid, and total left knee replacement       s/p rash across the upper torso/ back  for past week, pruritic on R arm, and rash in the groin     PPM  interrogation  with  WCT/   s/p  brief   bursts  of  afib/ , on prior visit         admitted with sob,  from acutr diastolic  chf, relate d to non complaince  with his  lasix    iv lasix   wound care/  lymphedema       CAD/ AFIb  /PPM,/ HTN     prior stent in 2007   on  on asa.  yoptol/ cozaar/ lipitor  h/o  AFIB,    no  a/c     Anemia, , hb stable, had  been seen by  gi  eval dr joann sanchez in past    right leg with distal redness.  not cellulitis / c/c  for past few  months  an d left leg lymphedema  on iv lasix   anemia,  cordelia sanchez   follow  weights /  xray spine        f/p wih  dr mikayla dick

## 2021-08-19 NOTE — PROGRESS NOTE ADULT - SUBJECTIVE AND OBJECTIVE BOX
afberile    REVIEW OF SYSTEMS:  GEN: no fever,    no chills  RESP: no SOB,   no cough  CVS: no chest pain,   no palpitations  GI: no abdominal pain,   no nausea,   no vomiting,   no constipation,   no diarrhea  : no dysuria,   no frequency  NEURO: no headache,   no dizziness  PSYCH: no depression,   not anxious  Derm : no rash    MEDICATIONS  (STANDING):  aspirin enteric coated 81 milliGRAM(s) Oral daily  cilostazol 100 milliGRAM(s) Oral two times a day  furosemide   Injectable 40 milliGRAM(s) IV Push daily  heparin   Injectable 5000 Unit(s) SubCutaneous every 12 hours  levothyroxine 137 MICROGram(s) Oral daily  losartan 25 milliGRAM(s) Oral daily  metoprolol succinate ER 50 milliGRAM(s) Oral daily  potassium chloride    Tablet ER 20 milliEquivalent(s) Oral daily    MEDICATIONS  (PRN):  oxycodone    5 mG/acetaminophen 325 mG 1 Tablet(s) Oral every 6 hours PRN Moderate Pain (4 - 6)      Vital Signs Last 24 Hrs  T(C): 36.9 (19 Aug 2021 08:11), Max: 36.9 (18 Aug 2021 20:30)  T(F): 98.5 (19 Aug 2021 08:11), Max: 98.5 (19 Aug 2021 08:11)  HR: 50 (19 Aug 2021 08:11) (50 - 68)  BP: 128/68 (19 Aug 2021 08:11) (114/65 - 128/68)  BP(mean): 78 (18 Aug 2021 20:30) (78 - 78)  RR: 18 (19 Aug 2021 08:11) (18 - 19)  SpO2: 96% (19 Aug 2021 08:11) (96% - 97%)  CAPILLARY BLOOD GLUCOSE        I&O's Summary      PHYSICAL EXAM:  HEAD:  Atraumatic, Normocephalic  NECK: Supple, No   JVD  CHEST/LUNG:   no     rales,     no,    rhonchi  HEART: Regular rate and rhythm;         murmur  ABDOMEN: Soft, Nontender, ;   EXTREMITIES:     c/c    edema  NEUROLOGY:  alert    LABS:                        9.4    4.61  )-----------( 144      ( 19 Aug 2021 05:45 )             30.3     08-19    142  |  108  |  28<H>  ----------------------------<  101<H>  4.3   |  25  |  1.18    Ca    9.0      19 Aug 2021 05:47    TPro  6.8  /  Alb  3.6  /  TBili  0.5  /  DBili  x   /  AST  17  /  ALT  12  /  AlkPhos  101  08-18    PT/INR - ( 18 Aug 2021 16:10 )   PT: 12.3 sec;   INR: 1.03 ratio         PTT - ( 18 Aug 2021 16:10 )  PTT:30.1 sec                        Consultant(s) Notes Reviewed:      Care Discussed with Consultants/Other Providers:

## 2021-08-19 NOTE — CONSULT NOTE ADULT - ASSESSMENT
77 yo  h/o  HT PPM, CAD.  LYMPHEDEMA. HTN  pw low back pain and SOB.      Patient states his low back pain began 5 days ago. He denies any trauma or falls. States the pain was across his entire lower back. Denies  leg   numbness, tingling, leg weakness, urinary or bowl incontinence.      He states the pain has improved but states he is still using his walker to walk instead of his cane.     He states of  the past 4 days he has also noticed DUNCAN. States he is only able to walk short distances with becoming dyspnea  and has  leg swelling.     He denies fever, chills, cough, chest pain, orthopnea, PND.      He states he did not take his lasix for the past few days because it makes him urinate too much.   pt is well known to me with hx of MR, htn, cad, a.fib on no ac ?sec to frequent falls, pvd on pletal, s/p ppm.  chf ?sec to non compliant to meds  decrease iv lasix  repeat echo to check MV  check ppm for any a.fib, if not Ac candidate will consider ?jenna  dvt prophylaxis  wilfredo schrader

## 2021-08-19 NOTE — CONSULT NOTE ADULT - SUBJECTIVE AND OBJECTIVE BOX
Wound SURGERY CONSULT NOTE    HPI:  75 yo M w/ PMH/o HTN, Asbestosis, s/p PPM, CAD & stents, LYMPHEDEMA, hypothyroid presented to ED w/low back pain and SOB. Patient states his low back pain began 5 days ago. He denies any trauma or falls. States the pain was across his entire lower back. Denies leg numbness, tingling, leg weakness, urinary or bowl incontinence. He states the pain has improved but states he is still using his walker to walk instead of his cane. He has had multiple operations on LLE and can not bend Lt knee.  Can walk, but requires assist of Cane/ walker/ or scooter, depending on how he feels. He states of  the past 4 days he has also noticed DUNCAN. States he is only able to walk short distances with becoming dyspnea  and has leg swelling worsening.  Pt follows with Lymphedema doctor in [facility] who wraps his leg 2x/ week. He denies that leg too tight with wraps and increased swelling.  RN in ED removed wrap.  Pt not sure name of wrap whether it is dynaflex vs unna boot type dressing.  Pt has been seeing a podiatrist in [place].  He denies fever, chills, cough, chest pain, orthopnea, PND. He states he did not take his lasix for the past few days because it makes him urinate too much.  Wound consult requested to assist w/ management of RLE wound and increased swelling.  Pt w/o  c/o pain, but he c/o clear drainage from pervious bx site on Rt shin and Rt 2nd toe.  Pt denies odor, but noted persistent redness RLE but not LLE.  BLE worsening swelling with stopping lasix. Pt Increasingly sedentary with swelling.  Pt denies recent falls, trauma.  Currently w/o drainage and legs left open pending our evaluation.  Appetite good w/o  weight loss. All questions asked and answered to pt's satisfaction.    Current Diet: Diet, Regular (08-18-21 @ 19:50)      PAST MEDICAL & SURGICAL HISTORY:  Afib    CAD (coronary artery disease)    Hypothyroid    Asbestosis    HTN (hypertension)    s/p Pacemaker, Medtr[employer] - Model RVDR01 1/10/2012    Stented coronary artery, drug eluding stent 5/2007    s/p detached retina repair, 1952    S/P cataract surgery    s/p Lt ORIF hip surgery, fracture of hip    s/p LLE varicose vein stripping, 1993     s/p left knee replacement 2013 - multiple infections post op requiring reoperation in 2015, 2017, 2019)    s/p foot surgery for infection of right foot 2019    s/p Rt inguinal hernia repair 1993        REVIEW OF SYSTEMS: General/ Skin/  MSK see HPI  All other systems negative    MEDICATIONS  (STANDING):  aspirin enteric coated 81 milliGRAM(s) Oral daily  cilostazol 100 milliGRAM(s) Oral two times a day  furosemide   Injectable 40 milliGRAM(s) IV Push daily  heparin   Injectable 5000 Unit(s) SubCutaneous every 12 hours  levothyroxine 137 MICROGram(s) Oral daily  losartan 25 milliGRAM(s) Oral daily  metoprolol succinate ER 50 milliGRAM(s) Oral daily  potassium chloride    Tablet ER 20 milliEquivalent(s) Oral daily    MEDICATIONS  (PRN):  oxycodone    5 mG/acetaminophen 325 mG 1 Tablet(s) Oral every 6 hours PRN Moderate Pain (4 - 6)      Allergies  alfuzosin (Hives)  levofloxacin (Hives)  Myrbetriq (Hives)  tamsulosin (Hives)    SOCIAL HISTORY:  Denies smoking, ETOH, drugs    FAMILY HISTORY: no h/o PVD or skin healing issues  FH: skin cancer (Father)        PHYSICAL EXAM:  Vital Signs Last 24 Hrs  T(C): 36.9 (19 Aug 2021 08:11), Max: 36.9 (18 Aug 2021 20:30)  T(F): 98.5 (19 Aug 2021 08:11), Max: 98.5 (19 Aug 2021 08:11)  HR: 50 (19 Aug 2021 08:11) (50 - 68)  BP: 128/68 (19 Aug 2021 08:11) (114/65 - 128/68)  BP(mean): 78 (18 Aug 2021 20:30) (78 - 78)  RR: 18 (19 Aug 2021 08:11) (18 - 19)  SpO2: 96% (19 Aug 2021 08:11) (96% - 97%)    NAD, A&Ox3, obese/WD/ WN/ WG  HEENT:  NC/AT, PERRL, EOMI, mucosa moist, throat clear, trachea midline, neck supple  Cardiovascular: RRR   Respiratory: CTA  Gastrointestinal: soft NT/ND (+)BS  Neurology: strength & sensation grossly intact  Musculoskeletal:  decreased ROM LLE otherwise FROM       LLE knee well healed incisions  Vascular: BLE equally warm, BLE edema equal       (+) DP/PT pulses palpable, no acute ischemia noted       RLE hemosiderin staining       Rt 2nd toe w/ serous crusting       RLE healed bx site on shin - no blistering or active weeping or open wounds          No odor, erythema, increased warmth, tenderness, induration, fluctuance  Skin:  moist w/ good turgor    LABS/ CULTURES/ RADIOLOGY:                        9.4    4.61  )-----------( 144      ( 19 Aug 2021 05:45 )             30.3       142  |  108  |  28  ----------------------------<  101      [08-19-21 @ 05:47]  4.3   |  25  |  1.18        Ca     9.0     [08-19-21 @ 05:47]    TPro  6.8  /  Alb  3.6  /  TBili  0.5  /  DBili  x   /  AST  17  /  ALT  12  /  AlkPhos  101  [08-18-21 @ 16:10]    PT/INR: PT 12.3 , INR 1.03       [08-18-21 @ 16:10]  PTT: 30.1       [08-18-21 @ 16:10]

## 2021-08-19 NOTE — CONSULT NOTE ADULT - ATTENDING COMMENTS
Above noted   75 yo male who admitting MD documented that patient has not been compliant with Lasix, and has worsened leg edema   Left knee is immobile 2/2 a "latha", placed at HSS  Reported lymphedema but toes are not square ( negative Stemmers sign)  Angio 1 yr ago is reported as  venous insufficiency in right calf, and assocated small vessel disease  At that time venous duplex was reported as negative for bilateral DVT  Right ankle -arm=1.32; Left ankle -arm= 1.20  Stressed compliance with Lasix  OP f/u info provided

## 2021-08-20 LAB
ANION GAP SERPL CALC-SCNC: 11 MMOL/L — SIGNIFICANT CHANGE UP (ref 5–17)
BUN SERPL-MCNC: 28 MG/DL — HIGH (ref 7–23)
CALCIUM SERPL-MCNC: 8.8 MG/DL — SIGNIFICANT CHANGE UP (ref 8.4–10.5)
CHLORIDE SERPL-SCNC: 104 MMOL/L — SIGNIFICANT CHANGE UP (ref 96–108)
CO2 SERPL-SCNC: 25 MMOL/L — SIGNIFICANT CHANGE UP (ref 22–31)
CREAT SERPL-MCNC: 1.32 MG/DL — HIGH (ref 0.5–1.3)
GLUCOSE SERPL-MCNC: 99 MG/DL — SIGNIFICANT CHANGE UP (ref 70–99)
HCT VFR BLD CALC: 30.4 % — LOW (ref 39–50)
HGB BLD-MCNC: 9.7 G/DL — LOW (ref 13–17)
MCHC RBC-ENTMCNC: 29.6 PG — SIGNIFICANT CHANGE UP (ref 27–34)
MCHC RBC-ENTMCNC: 31.9 GM/DL — LOW (ref 32–36)
MCV RBC AUTO: 92.7 FL — SIGNIFICANT CHANGE UP (ref 80–100)
NRBC # BLD: 0 /100 WBCS — SIGNIFICANT CHANGE UP (ref 0–0)
PLATELET # BLD AUTO: 144 K/UL — LOW (ref 150–400)
POTASSIUM SERPL-MCNC: 4.1 MMOL/L — SIGNIFICANT CHANGE UP (ref 3.5–5.3)
POTASSIUM SERPL-SCNC: 4.1 MMOL/L — SIGNIFICANT CHANGE UP (ref 3.5–5.3)
RBC # BLD: 3.28 M/UL — LOW (ref 4.2–5.8)
RBC # FLD: 14.6 % — HIGH (ref 10.3–14.5)
SODIUM SERPL-SCNC: 140 MMOL/L — SIGNIFICANT CHANGE UP (ref 135–145)
WBC # BLD: 4.97 K/UL — SIGNIFICANT CHANGE UP (ref 3.8–10.5)
WBC # FLD AUTO: 4.97 K/UL — SIGNIFICANT CHANGE UP (ref 3.8–10.5)

## 2021-08-20 RX ADMIN — Medication 40 MILLIGRAM(S): at 05:21

## 2021-08-20 RX ADMIN — HEPARIN SODIUM 5000 UNIT(S): 5000 INJECTION INTRAVENOUS; SUBCUTANEOUS at 17:35

## 2021-08-20 RX ADMIN — Medication 81 MILLIGRAM(S): at 12:43

## 2021-08-20 RX ADMIN — Medication 20 MILLIEQUIVALENT(S): at 12:43

## 2021-08-20 RX ADMIN — Medication 137 MICROGRAM(S): at 05:21

## 2021-08-20 RX ADMIN — HEPARIN SODIUM 5000 UNIT(S): 5000 INJECTION INTRAVENOUS; SUBCUTANEOUS at 05:22

## 2021-08-20 RX ADMIN — Medication 50 MILLIGRAM(S): at 05:22

## 2021-08-20 RX ADMIN — CILOSTAZOL 100 MILLIGRAM(S): 100 TABLET ORAL at 17:35

## 2021-08-20 RX ADMIN — CILOSTAZOL 100 MILLIGRAM(S): 100 TABLET ORAL at 05:21

## 2021-08-20 NOTE — CONSULT NOTE ADULT - SUBJECTIVE AND OBJECTIVE BOX
[facility]  Thanh Michaels PA-C  [address] [place] [address]  [phone]      Chief Complaint:  Patient is a 76y old  Male who presents with a chief complaint of sob (20 Aug 2021 08:56)      HPI:77 yo M w/ PMH/o HTN, Asbestosis, s/p PPM, CAD & stents, LYMPHEDEMA, hypothyroid presented to ED w/low back pain and SOB. Patient states his low back pain began 5 days ago. He denies any trauma or falls. States the pain was across his entire lower back. Denies leg numbness, tingling, leg weakness, urinary or bowl incontinence. He states the pain has improved but states he is still using his walker to walk instead of his cane. He has had multiple operations on LLE and can not bend Lt knee.  Can walk, but requires assist of Cane/ walker/ or scooter, depending on how he feels. He states of  the past 4 days he has also noticed DUNCAN. States he is only able to walk short distances with becoming dyspnea  and has leg swelling     Allergies:  alfuzosin (Hives)  levofloxacin (Hives)  Myrbetriq (Hives)  tamsulosin (Hives)      Medications:  aspirin enteric coated 81 milliGRAM(s) Oral daily  cilostazol 100 milliGRAM(s) Oral two times a day  furosemide   Injectable 40 milliGRAM(s) IV Push daily  heparin   Injectable 5000 Unit(s) SubCutaneous every 12 hours  levothyroxine 137 MICROGram(s) Oral daily  losartan 25 milliGRAM(s) Oral daily  metoprolol succinate ER 50 milliGRAM(s) Oral daily  oxycodone    5 mG/acetaminophen 325 mG 1 Tablet(s) Oral every 6 hours PRN  potassium chloride    Tablet ER 20 milliEquivalent(s) Oral daily      PMHX/PSHX:  Afib    CAD (coronary artery disease)    Varicose vein of leg    Hypothyroid    Pacemaker    H/O fracture of hip    H/O asbestosis    HTN (hypertension)    History of total left knee replacement (TKR)    Pacemaker    Stented coronary artery    H/O detached retina repair    S/P cataract surgery    History of hip surgery    H/O varicose vein stripping    History of left knee replacement    H/O foot surgery    H/O inguinal hernia repair        Family history:  No pertinent family history in first degree relatives    FH: skin cancer (Father)        Social History:     ROS:     General:  No wt loss, fevers, chills, night sweats, fatigue,   Eyes:  Good vision, no reported pain  ENT:  No sore throat, pain, runny nose, dysphagia  CV:  No pain, palpitations, hypo/hypertension  Resp:  No dyspnea, cough, tachypnea, wheezing  GI:  No pain, No nausea, No vomiting, No diarrhea, No constipation, No weight loss, No fever, No pruritis, No rectal bleeding, No tarry stools, No dysphagia,  :  No pain, bleeding, incontinence, nocturia  Muscle:  No pain, weakness  Neuro:  No weakness, tingling, memory problems  Psych:  No fatigue, insomnia, mood problems, depression  Endocrine:  No polyuria, polydipsia, cold/heat intolerance  Heme:  No petechiae, ecchymosis, easy bruisability  Skin:  No rash, tattoos, scars, edema      PHYSICAL EXAM:   Vital Signs:  Vital Signs Last 24 Hrs  T(C): 36.9 (20 Aug 2021 08:42), Max: 36.9 (20 Aug 2021 00:12)  T(F): 98.5 (20 Aug 2021 08:42), Max: 98.5 (20 Aug 2021 08:42)  HR: 66 (20 Aug 2021 08:42) (60 - 68)  BP: 109/58 (20 Aug 2021 08:42) (100/60 - 144/62)  BP(mean): --  RR: 18 (20 Aug 2021 08:42) (18 - 18)  SpO2: 95% (20 Aug 2021 08:42) (93% - 97%)  Daily     Daily     GENERAL:  Appears stated age,   HEENT:  NC/AT,    CHEST:  Full & symmetric excursion,   HEART:  Regular rhythm  ABDOMEN:  Soft, non-tender, non-distended,   EXTEREMITIES:  no cyanosis,clubbing or edema  SKIN:  No rash  NEURO:  Alert,    LABS:                        9.7    4.97  )-----------( 144      ( 20 Aug 2021 07:18 )             30.4     08-20    140  |  104  |  28<H>  ----------------------------<  99  4.1   |  25  |  1.32<H>    Ca    8.8      20 Aug 2021 07:18    TPro  6.8  /  Alb  3.6  /  TBili  0.5  /  DBili  x   /  AST  17  /  ALT  12  /  AlkPhos  101  08-18    LIVER FUNCTIONS - ( 18 Aug 2021 16:10 )  Alb: 3.6 g/dL / Pro: 6.8 g/dL / ALK PHOS: 101 U/L / ALT: 12 U/L / AST: 17 U/L / GGT: x           PT/INR - ( 18 Aug 2021 16:10 )   PT: 12.3 sec;   INR: 1.03 ratio         PTT - ( 18 Aug 2021 16:10 )  PTT:30.1 sec        Imaging:

## 2021-08-20 NOTE — PROGRESS NOTE ADULT - SUBJECTIVE AND OBJECTIVE BOX
CARDIOLOGY     PROGRESS  NOTE   ________________________________________________    CHIEF COMPLAINT:Patient is a 76y old  Male who presents with a chief complaint of sob (19 Aug 2021 13:01)  doing better.  	  REVIEW OF SYSTEMS:  CONSTITUTIONAL: No fever, weight loss, or fatigue  EYES: No eye pain, visual disturbances, or discharge  ENT:  No difficulty hearing, tinnitus, vertigo; No sinus or throat pain  NECK: No pain or stiffness  RESPIRATORY: No cough, wheezing, chills or hemoptysis; No Shortness of Breath  CARDIOVASCULAR: No chest pain, palpitations, passing out, dizziness, or leg swelling  GASTROINTESTINAL: No abdominal or epigastric pain. No nausea, vomiting, or hematemesis; No diarrhea or constipation. No melena or hematochezia.  GENITOURINARY: No dysuria, frequency, hematuria, or incontinence  NEUROLOGICAL: No headaches, memory loss, loss of strength, numbness, or tremors  SKIN: No itching, burning, rashes, or lesions   LYMPH Nodes: No enlarged glands  ENDOCRINE: No heat or cold intolerance; No hair loss  MUSCULOSKELETAL: No joint pain or swelling; No muscle, back, or extremity pain  PSYCHIATRIC: No depression, anxiety, mood swings, or difficulty sleeping  HEME/LYMPH: No easy bruising, or bleeding gums  ALLERGY AND IMMUNOLOGIC: No hives or eczema	    [ ] All others negative	  [ ] Unable to obtain    PHYSICAL EXAM:  T(C): 36.9 (08-20-21 @ 08:42), Max: 36.9 (08-20-21 @ 00:12)  HR: 66 (08-20-21 @ 08:42) (60 - 68)  BP: 109/58 (08-20-21 @ 08:42) (100/60 - 144/62)  RR: 18 (08-20-21 @ 08:42) (18 - 18)  SpO2: 95% (08-20-21 @ 08:42) (93% - 97%)  Wt(kg): --  I&O's Summary    19 Aug 2021 07:01  -  20 Aug 2021 07:00  --------------------------------------------------------  IN: 100 mL / OUT: 2325 mL / NET: -2225 mL        Appearance: Normal	  HEENT:   Normal oral mucosa, PERRL, EOMI	  Lymphatic: No lymphadenopathy  Cardiovascular: Normal S1 S2, No JVD, No murmurs, + edema  Respiratory: Lungs clear to auscultation	  Psychiatry: A & O x 3, Mood & affect appropriate  Gastrointestinal:  Soft, Non-tender, + BS	  Skin: No rashes, No ecchymoses, No cyanosis	  Neurologic: Non-focal  Extremities: Normal range of motion, No clubbing, cyanosis . =edema  Vascular: Peripheral pulses palpable 2+ bilaterally    MEDICATIONS  (STANDING):  aspirin enteric coated 81 milliGRAM(s) Oral daily  cilostazol 100 milliGRAM(s) Oral two times a day  furosemide   Injectable 40 milliGRAM(s) IV Push daily  heparin   Injectable 5000 Unit(s) SubCutaneous every 12 hours  levothyroxine 137 MICROGram(s) Oral daily  losartan 25 milliGRAM(s) Oral daily  metoprolol succinate ER 50 milliGRAM(s) Oral daily  potassium chloride    Tablet ER 20 milliEquivalent(s) Oral daily      TELEMETRY: 	    ECG:  	  RADIOLOGY:  OTHER: 	  	  LABS:	 	    CARDIAC MARKERS:                                9.7    4.97  )-----------( 144      ( 20 Aug 2021 07:18 )             30.4     08-20    140  |  104  |  28<H>  ----------------------------<  99  4.1   |  25  |  1.32<H>    Ca    8.8      20 Aug 2021 07:18    TPro  6.8  /  Alb  3.6  /  TBili  0.5  /  DBili  x   /  AST  17  /  ALT  12  /  AlkPhos  101  08-18    proBNP: Serum Pro-Brain Natriuretic Peptide: 1881 pg/mL (08-18 @ 16:10)    Lipid Profile:   HgA1c:   TSH:   PT/INR - ( 18 Aug 2021 16:10 )   PT: 12.3 sec;   INR: 1.03 ratio         PTT - ( 18 Aug 2021 16:10 )  PTT:30.1 sec    < from: Transthoracic Echocardiogram (08.19.21 @ 10:28) >  1. Mitral annular calcification with calcified posterior  mitral leaflet. Mitral valve prolapse involving the  posterior mitral leaflet. Moderate-severe, eccentric mitral  regurgitation directed towards the interatrial septum.  2. Calcified trileaflet aortic valve with normal opening.  Mild aortic regurgitation.  3. Patient in atrial fibrillation; ejection fraction varies  with R-R interval. Normal left ventricular systolic  function. No segmental wall motion abnormalities.  4. Right ventricle not well visualized; right ventricle  appears midlly enlarged with normal right ventricular  systolic function. A device wire is noted in the right  heart.        Assessment and plan  ---------------------------  75 yo  h/o  HT PPM, CAD.  LYMPHEDEMA. HTN  pw low back pain and SOB.      Patient states his low back pain began 5 days ago. He denies any trauma or falls. States the pain was across his entire lower back. Denies  leg   numbness, tingling, leg weakness, urinary or bowl incontinence.      He states the pain has improved but states he is still using his walker to walk instead of his cane.     He states of  the past 4 days he has also noticed DUNCAN. States he is only able to walk short distances with becoming dyspnea  and has  leg swelling.     He denies fever, chills, cough, chest pain, orthopnea, PND.      He states he did not take his lasix for the past few days because it makes him urinate too much.   pt is well known to me with hx of MR, htn, cad, a.fib on no ac ?sec to frequent falls, pvd on pletal, s/p ppm.  chf ?sec to non compliant to meds  decrease iv lasix  repeat echo to check MV  check ppm for any a.fib, if not Ac candidate will consider ?watchman  dvt prophylaxis  fu lytes  MR needs to be addressed pt wants to fu with his cardiologist at [facility]  continue with lasix    	                    CARDIOLOGY     PROGRESS  NOTE   ________________________________________________    CHIEF COMPLAINT:Patient is a 76y old  Male who presents with a chief complaint of sob (19 Aug 2021 13:01)  doing better.  	  REVIEW OF SYSTEMS:  CONSTITUTIONAL: No fever, weight loss, or fatigue  EYES: No eye pain, visual disturbances, or discharge  ENT:  No difficulty hearing, tinnitus, vertigo; No sinus or throat pain  NECK: No pain or stiffness  RESPIRATORY: No cough, wheezing, chills or hemoptysis; No Shortness of Breath  CARDIOVASCULAR: No chest pain, palpitations, passing out, dizziness, or leg swelling  GASTROINTESTINAL: No abdominal or epigastric pain. No nausea, vomiting, or hematemesis; No diarrhea or constipation. No melena or hematochezia.  GENITOURINARY: No dysuria, frequency, hematuria, or incontinence  NEUROLOGICAL: No headaches, memory loss, loss of strength, numbness, or tremors  SKIN: No itching, burning, rashes, or lesions   LYMPH Nodes: No enlarged glands  ENDOCRINE: No heat or cold intolerance; No hair loss  MUSCULOSKELETAL: No joint pain or swelling; No muscle, back, or extremity pain  PSYCHIATRIC: No depression, anxiety, mood swings, or difficulty sleeping  HEME/LYMPH: No easy bruising, or bleeding gums  ALLERGY AND IMMUNOLOGIC: No hives or eczema	    [ ] All others negative	  [ ] Unable to obtain    PHYSICAL EXAM:  T(C): 36.9 (08-20-21 @ 08:42), Max: 36.9 (08-20-21 @ 00:12)  HR: 66 (08-20-21 @ 08:42) (60 - 68)  BP: 109/58 (08-20-21 @ 08:42) (100/60 - 144/62)  RR: 18 (08-20-21 @ 08:42) (18 - 18)  SpO2: 95% (08-20-21 @ 08:42) (93% - 97%)  Wt(kg): --  I&O's Summary    19 Aug 2021 07:01  -  20 Aug 2021 07:00  --------------------------------------------------------  IN: 100 mL / OUT: 2325 mL / NET: -2225 mL        Appearance: Normal	  HEENT:   Normal oral mucosa, PERRL, EOMI	  Lymphatic: No lymphadenopathy  Cardiovascular: Normal S1 S2, No JVD, No murmurs, + edema  Respiratory: Lungs clear to auscultation	  Psychiatry: A & O x 3, Mood & affect appropriate  Gastrointestinal:  Soft, Non-tender, + BS	  Skin: No rashes, No ecchymoses, No cyanosis	  Neurologic: Non-focal  Extremities: Normal range of motion, No clubbing, cyanosis . =edema  Vascular: Peripheral pulses palpable 2+ bilaterally    MEDICATIONS  (STANDING):  aspirin enteric coated 81 milliGRAM(s) Oral daily  cilostazol 100 milliGRAM(s) Oral two times a day  furosemide   Injectable 40 milliGRAM(s) IV Push daily  heparin   Injectable 5000 Unit(s) SubCutaneous every 12 hours  levothyroxine 137 MICROGram(s) Oral daily  losartan 25 milliGRAM(s) Oral daily  metoprolol succinate ER 50 milliGRAM(s) Oral daily  potassium chloride    Tablet ER 20 milliEquivalent(s) Oral daily      TELEMETRY: 	    ECG:  	  RADIOLOGY:  OTHER: 	  	  LABS:	 	    CARDIAC MARKERS:                                9.7    4.97  )-----------( 144      ( 20 Aug 2021 07:18 )             30.4     08-20    140  |  104  |  28<H>  ----------------------------<  99  4.1   |  25  |  1.32<H>    Ca    8.8      20 Aug 2021 07:18    TPro  6.8  /  Alb  3.6  /  TBili  0.5  /  DBili  x   /  AST  17  /  ALT  12  /  AlkPhos  101  08-18    proBNP: Serum Pro-Brain Natriuretic Peptide: 1881 pg/mL (08-18 @ 16:10)    Lipid Profile:   HgA1c:   TSH:   PT/INR - ( 18 Aug 2021 16:10 )   PT: 12.3 sec;   INR: 1.03 ratio         PTT - ( 18 Aug 2021 16:10 )  PTT:30.1 sec    < from: Transthoracic Echocardiogram (08.19.21 @ 10:28) >  1. Mitral annular calcification with calcified posterior  mitral leaflet. Mitral valve prolapse involving the  posterior mitral leaflet. Moderate-severe, eccentric mitral  regurgitation directed towards the interatrial septum.  2. Calcified trileaflet aortic valve with normal opening.  Mild aortic regurgitation.  3. Patient in atrial fibrillation; ejection fraction varies  with R-R interval. Normal left ventricular systolic  function. No segmental wall motion abnormalities.  4. Right ventricle not well visualized; right ventricle  appears midlly enlarged with normal right ventricular  systolic function. A device wire is noted in the right  heart.        Assessment and plan  ---------------------------  75 yo  h/o  HT PPM, CAD.  LYMPHEDEMA. HTN  pw low back pain and SOB.      Patient states his low back pain began 5 days ago. He denies any trauma or falls. States the pain was across his entire lower back. Denies  leg   numbness, tingling, leg weakness, urinary or bowl incontinence.      He states the pain has improved but states he is still using his walker to walk instead of his cane.     He states of  the past 4 days he has also noticed DUNCAN. States he is only able to walk short distances with becoming dyspnea  and has  leg swelling.     He denies fever, chills, cough, chest pain, orthopnea, PND.      He states he did not take his lasix for the past few days because it makes him urinate too much.   pt is well known to me with hx of MR, htn, cad, a.fib on no ac ?sec to frequent falls, pvd on pletal, s/p ppm.  chf ?sec to non compliant to meds  decrease iv lasix  repeat echo to check MV  check ppm for any a.fib, if not Ac candidate will consider ?watchman  dvt prophylaxis  fu lytes  MR needs to be addressed  continue with lasix

## 2021-08-20 NOTE — PROGRESS NOTE ADULT - ASSESSMENT
76  year old male          h/o   , CAD s/p stent on ASA, A-fib/ PPM, , hypothyroid, and total left knee replacement       s/p rash across the upper torso/ back  for past week, pruritic on R arm, and rash in the groin     PPM  interrogation  with  [facility]/   s/p  brief   bursts  of  afib/ , on prior visit         admitted with sob,  from acutr diastolic  chf, relate d to non complaince  with his  lasix    iv lasix   wound care/  lymphedema       CAD/ AFIb  /PPM,/ HTN     prior stent in 2007   on  on asa.  yoptol/ cozaar/ lipitor  h/o  AFIB,    no  a/c     Anemia, , hb stable, had  been seen by  gi  eval dr joann sanchez in past    right leg with distal redness.  not cellulitis / c/c  for past few  months  an d left leg lymphedema  on iv lasix  40 mg   anemia,  d nadira sanchez   follow  weights /  xray spine  hb is 9.7  on iv lasix        f/p renee dick                               76  year old male          h/o   , CAD s/p stent on ASA, A-fib/ PPM, , hypothyroid, and total left knee replacement       s/p rash across the upper torso/ back  for past week, pruritic on R arm, and rash in the groin     PPM  interrogation  with  [facility]/   s/p  brief   bursts  of  afib/ , on prior visit         admitted with sob,  from acutr diastolic  chf, relate d to non complaince  with his  lasix    iv lasix   wound care/  lymphedema       CAD/ AFIb  /PPM,/ HTN     prior stent in 2007   on  on asa.  yoptol/ cozaar/ lipitor  h/o  AFIB,    no  a/c     Anemia, , hb stable, had  been seen by  gi  eval dr joann sanchez in past    right leg with distal redness.  not cellulitis / c/c  for past few  months  an d left leg lymphedema  on iv lasix  40 mg   anemia,  d nadira sanchez   follow  weights /  xray spine  hb is 9.7  on iv lasix/ echo, normal  ef/ severe MR.  w/p  pe r card        f/p wih  dr mikayla dick

## 2021-08-20 NOTE — CONSULT NOTE ADULT - ASSESSMENT
anemia  afib  cad    no overt gi bleed  denies melena/hematochezia  cont regular diet  had egd/colon 12/2020 (cher metha)  no objection to continue asa/pletal  will follow     Advanced care planning was discussed with patient and family.  Advanced care planning forms were reviewed and discussed.  Risks, benefits and alternatives of gastroenterologic procedures were discussed in detail and all questions were answered.    30 minutes spent.

## 2021-08-20 NOTE — PROGRESS NOTE ADULT - SUBJECTIVE AND OBJECTIVE BOX
afebriel  REVIEW OF SYSTEMS:  GEN: no fever,    no chills  RESP: no SOB,   no cough  CVS: no chest pain,   no palpitations  GI: no abdominal pain,   no nausea,   no vomiting,   no constipation,   no diarrhea  : no dysuria,   no frequency  NEURO: no headache,   no dizziness  PSYCH: no depression,   not anxious  Derm : no rash    MEDICATIONS  (STANDING):  aspirin enteric coated 81 milliGRAM(s) Oral daily  cilostazol 100 milliGRAM(s) Oral two times a day  furosemide   Injectable 40 milliGRAM(s) IV Push daily  heparin   Injectable 5000 Unit(s) SubCutaneous every 12 hours  levothyroxine 137 MICROGram(s) Oral daily  losartan 25 milliGRAM(s) Oral daily  metoprolol succinate ER 50 milliGRAM(s) Oral daily  potassium chloride    Tablet ER 20 milliEquivalent(s) Oral daily    MEDICATIONS  (PRN):  oxycodone    5 mG/acetaminophen 325 mG 1 Tablet(s) Oral every 6 hours PRN Moderate Pain (4 - 6)      Vital Signs Last 24 Hrs  T(C): 36.9 (20 Aug 2021 08:42), Max: 36.9 (20 Aug 2021 00:12)  T(F): 98.5 (20 Aug 2021 08:42), Max: 98.5 (20 Aug 2021 08:42)  HR: 66 (20 Aug 2021 08:42) (60 - 68)  BP: 109/58 (20 Aug 2021 08:42) (100/60 - 144/62)  BP(mean): --  RR: 18 (20 Aug 2021 08:42) (18 - 18)  SpO2: 95% (20 Aug 2021 08:42) (93% - 97%)  CAPILLARY BLOOD GLUCOSE        I&O's Summary    19 Aug 2021 07:01  -  20 Aug 2021 07:00  --------------------------------------------------------  IN: 100 mL / OUT: 2325 mL / NET: -2225 mL        PHYSICAL EXAM:  HEAD:  Atraumatic, Normocephalic  NECK: Supple, No   JVD  CHEST/LUNG:   no     rales,     no,    rhonchi  HEART: Regular rate and rhythm;         murmur  ABDOMEN: Soft, Nontender, ;   EXTREMITIES:    no    edema  NEUROLOGY:  alert    LABS:                        9.7    4.97  )-----------( 144      ( 20 Aug 2021 07:18 )             30.4     08-20    140  |  104  |  28<H>  ----------------------------<  99  4.1   |  25  |  1.32<H>    Ca    8.8      20 Aug 2021 07:18    TPro  6.8  /  Alb  3.6  /  TBili  0.5  /  DBili  x   /  AST  17  /  ALT  12  /  AlkPhos  101  08-18    PT/INR - ( 18 Aug 2021 16:10 )   PT: 12.3 sec;   INR: 1.03 ratio         PTT - ( 18 Aug 2021 16:10 )  PTT:30.1 sec                        Consultant(s) Notes Reviewed:      Care Discussed with Consultants/Other Providers:

## 2021-08-21 LAB
ANION GAP SERPL CALC-SCNC: 12 MMOL/L — SIGNIFICANT CHANGE UP (ref 5–17)
BUN SERPL-MCNC: 26 MG/DL — HIGH (ref 7–23)
CALCIUM SERPL-MCNC: 9.2 MG/DL — SIGNIFICANT CHANGE UP (ref 8.4–10.5)
CHLORIDE SERPL-SCNC: 103 MMOL/L — SIGNIFICANT CHANGE UP (ref 96–108)
CO2 SERPL-SCNC: 24 MMOL/L — SIGNIFICANT CHANGE UP (ref 22–31)
CREAT SERPL-MCNC: 1.24 MG/DL — SIGNIFICANT CHANGE UP (ref 0.5–1.3)
GLUCOSE SERPL-MCNC: 97 MG/DL — SIGNIFICANT CHANGE UP (ref 70–99)
HCT VFR BLD CALC: 31.3 % — LOW (ref 39–50)
HGB BLD-MCNC: 10.2 G/DL — LOW (ref 13–17)
MCHC RBC-ENTMCNC: 29.9 PG — SIGNIFICANT CHANGE UP (ref 27–34)
MCHC RBC-ENTMCNC: 32.6 GM/DL — SIGNIFICANT CHANGE UP (ref 32–36)
MCV RBC AUTO: 91.8 FL — SIGNIFICANT CHANGE UP (ref 80–100)
NRBC # BLD: 0 /100 WBCS — SIGNIFICANT CHANGE UP (ref 0–0)
PLATELET # BLD AUTO: 157 K/UL — SIGNIFICANT CHANGE UP (ref 150–400)
POTASSIUM SERPL-MCNC: 4.1 MMOL/L — SIGNIFICANT CHANGE UP (ref 3.5–5.3)
POTASSIUM SERPL-SCNC: 4.1 MMOL/L — SIGNIFICANT CHANGE UP (ref 3.5–5.3)
RBC # BLD: 3.41 M/UL — LOW (ref 4.2–5.8)
RBC # FLD: 14.3 % — SIGNIFICANT CHANGE UP (ref 10.3–14.5)
SODIUM SERPL-SCNC: 139 MMOL/L — SIGNIFICANT CHANGE UP (ref 135–145)
WBC # BLD: 4.85 K/UL — SIGNIFICANT CHANGE UP (ref 3.8–10.5)
WBC # FLD AUTO: 4.85 K/UL — SIGNIFICANT CHANGE UP (ref 3.8–10.5)

## 2021-08-21 PROCEDURE — 93010 ELECTROCARDIOGRAM REPORT: CPT | Mod: 76

## 2021-08-21 RX ORDER — LOSARTAN POTASSIUM 100 MG/1
50 TABLET, FILM COATED ORAL DAILY
Refills: 0 | Status: DISCONTINUED | OUTPATIENT
Start: 2021-08-21 | End: 2021-08-26

## 2021-08-21 RX ADMIN — LOSARTAN POTASSIUM 25 MILLIGRAM(S): 100 TABLET, FILM COATED ORAL at 05:48

## 2021-08-21 RX ADMIN — CILOSTAZOL 100 MILLIGRAM(S): 100 TABLET ORAL at 05:48

## 2021-08-21 RX ADMIN — HEPARIN SODIUM 5000 UNIT(S): 5000 INJECTION INTRAVENOUS; SUBCUTANEOUS at 18:11

## 2021-08-21 RX ADMIN — Medication 20 MILLIEQUIVALENT(S): at 11:22

## 2021-08-21 RX ADMIN — Medication 40 MILLIGRAM(S): at 05:49

## 2021-08-21 RX ADMIN — Medication 50 MILLIGRAM(S): at 05:48

## 2021-08-21 RX ADMIN — Medication 81 MILLIGRAM(S): at 11:22

## 2021-08-21 RX ADMIN — Medication 137 MICROGRAM(S): at 05:47

## 2021-08-21 RX ADMIN — CILOSTAZOL 100 MILLIGRAM(S): 100 TABLET ORAL at 18:11

## 2021-08-21 RX ADMIN — HEPARIN SODIUM 5000 UNIT(S): 5000 INJECTION INTRAVENOUS; SUBCUTANEOUS at 05:49

## 2021-08-21 NOTE — PROGRESS NOTE ADULT - SUBJECTIVE AND OBJECTIVE BOX
afberile    REVIEW OF SYSTEMS:  GEN: no fever,    no chills  RESP: no SOB,   no cough  CVS: no chest pain,   no palpitations  GI: no abdominal pain,   no nausea,   no vomiting,   no constipation,   no diarrhea  : no dysuria,   no frequency  NEURO: no headache,   no dizziness  PSYCH: no depression,   not anxious  Derm : no rash    MEDICATIONS  (STANDING):  aspirin enteric coated 81 milliGRAM(s) Oral daily  cilostazol 100 milliGRAM(s) Oral two times a day  furosemide   Injectable 40 milliGRAM(s) IV Push daily  heparin   Injectable 5000 Unit(s) SubCutaneous every 12 hours  levothyroxine 137 MICROGram(s) Oral daily  losartan 50 milliGRAM(s) Oral daily  metoprolol succinate ER 50 milliGRAM(s) Oral daily  potassium chloride    Tablet ER 20 milliEquivalent(s) Oral daily    MEDICATIONS  (PRN):  oxycodone    5 mG/acetaminophen 325 mG 1 Tablet(s) Oral every 6 hours PRN Moderate Pain (4 - 6)      Vital Signs Last 24 Hrs  T(C): 36.8 (21 Aug 2021 00:52), Max: 37.5 (20 Aug 2021 17:35)  T(F): 98.2 (21 Aug 2021 00:52), Max: 99.5 (20 Aug 2021 17:35)  HR: 81 (21 Aug 2021 00:52) (66 - 81)  BP: 143/63 (21 Aug 2021 00:52) (103/71 - 143/63)  BP(mean): --  RR: 18 (21 Aug 2021 00:52) (18 - 18)  SpO2: 94% (21 Aug 2021 00:52) (94% - 97%)  CAPILLARY BLOOD GLUCOSE        I&O's Summary    20 Aug 2021 07:01  -  21 Aug 2021 07:00  --------------------------------------------------------  IN: 780 mL / OUT: 2700 mL / NET: -1920 mL        PHYSICAL EXAM:  HEAD:  Atraumatic, Normocephalic  NECK: Supple, No   JVD  CHEST/LUNG:   no     rales,     no,    rhonchi  HEART: Regular rate and rhythm;         murmur  ABDOMEN: Soft, Nontender, ;   EXTREMITIES:     less   edema  NEUROLOGY:  alert    LABS:                        10.2   4.85  )-----------( 157      ( 21 Aug 2021 07:24 )             31.3     08-21    139  |  103  |  26<H>  ----------------------------<  97  4.1   |  24  |  1.24    Ca    9.2      21 Aug 2021 07:24                              Consultant(s) Notes Reviewed:      Care Discussed with Consultants/Other Providers:

## 2021-08-21 NOTE — PROGRESS NOTE ADULT - ASSESSMENT
anemia  afib  cad    no overt gi bleed  denies melena/hematochezia  hgb stable  cont regular diet  had egd/colon 12/2020 (cher yang)  no objection to continue asa/pletal  will follow   dw patient

## 2021-08-21 NOTE — PROGRESS NOTE ADULT - SUBJECTIVE AND OBJECTIVE BOX
CARDIOLOGY     PROGRESS  NOTE   ________________________________________________    CHIEF COMPLAINT:Patient is a 76y old  Male who presents with a chief complaint of sob (20 Aug 2021 10:48)  no complain.  	  REVIEW OF SYSTEMS:  CONSTITUTIONAL: No fever, weight loss, or fatigue  EYES: No eye pain, visual disturbances, or discharge  ENT:  No difficulty hearing, tinnitus, vertigo; No sinus or throat pain  NECK: No pain or stiffness  RESPIRATORY: No cough, wheezing, chills or hemoptysis; decrease  Shortness of Breath  CARDIOVASCULAR: No chest pain, palpitations, passing out, dizziness, or leg swelling  GASTROINTESTINAL: No abdominal or epigastric pain. No nausea, vomiting, or hematemesis; No diarrhea or constipation. No melena or hematochezia.  GENITOURINARY: No dysuria, frequency, hematuria, or incontinence  NEUROLOGICAL: No headaches, memory loss, loss of strength, numbness, or tremors  SKIN: No itching, burning, rashes, or lesions   LYMPH Nodes: No enlarged glands  ENDOCRINE: No heat or cold intolerance; No hair loss  MUSCULOSKELETAL: No joint pain or swelling; No muscle, back, or extremity pain  PSYCHIATRIC: No depression, anxiety, mood swings, or difficulty sleeping  HEME/LYMPH: No easy bruising, or bleeding gums  ALLERGY AND IMMUNOLOGIC: No hives or eczema	    [ ] All others negative	  [ ] Unable to obtain    PHYSICAL EXAM:  T(C): 36.8 (08-21-21 @ 00:52), Max: 37.5 (08-20-21 @ 17:35)  HR: 81 (08-21-21 @ 00:52) (66 - 81)  BP: 143/63 (08-21-21 @ 00:52) (103/71 - 143/63)  RR: 18 (08-21-21 @ 00:52) (18 - 18)  SpO2: 94% (08-21-21 @ 00:52) (94% - 97%)  Wt(kg): --  I&O's Summary    19 Aug 2021 07:01  -  20 Aug 2021 07:00  --------------------------------------------------------  IN: 100 mL / OUT: 2325 mL / NET: -2225 mL    20 Aug 2021 07:01  -  21 Aug 2021 06:50  --------------------------------------------------------  IN: 780 mL / OUT: 2700 mL / NET: -1920 mL        Appearance: Normal	  HEENT:   Normal oral mucosa, PERRL, EOMI	  Lymphatic: No lymphadenopathy  Cardiovascular: Normal S1 S2, No JVD, + murmurs, decrease  edema  Respiratory: Lungs clear to auscultation	  Psychiatry: A & O x 3, Mood & affect appropriate  Gastrointestinal:  Soft, Non-tender, + BS	  Skin: No rashes, No ecchymoses, No cyanosis	  Neurologic: Non-focal  Extremities: Normal range of motion, No clubbing, cyanosis . + edema  Vascular: Peripheral pulses palpable 2+ bilaterally    MEDICATIONS  (STANDING):  aspirin enteric coated 81 milliGRAM(s) Oral daily  cilostazol 100 milliGRAM(s) Oral two times a day  furosemide   Injectable 40 milliGRAM(s) IV Push daily  heparin   Injectable 5000 Unit(s) SubCutaneous every 12 hours  levothyroxine 137 MICROGram(s) Oral daily  losartan 25 milliGRAM(s) Oral daily  metoprolol succinate ER 50 milliGRAM(s) Oral daily  potassium chloride    Tablet ER 20 milliEquivalent(s) Oral daily      TELEMETRY: 	    ECG:  	  RADIOLOGY:  OTHER: 	  	  LABS:	 	    CARDIAC MARKERS:                                9.7    4.97  )-----------( 144      ( 20 Aug 2021 07:18 )             30.4     08-20    140  |  104  |  28<H>  ----------------------------<  99  4.1   |  25  |  1.32<H>    Ca    8.8      20 Aug 2021 07:18      proBNP: Serum Pro-Brain Natriuretic Peptide: 1881 pg/mL (08-18 @ 16:10)    Lipid Profile:   HgA1c:   TSH:   < from: Transthoracic Echocardiogram (08.19.21 @ 10:28) >  1. Mitral annular calcification with calcified posterior  mitral leaflet. Mitral valve prolapse involving the  posterior mitral leaflet. Moderate-severe, eccentric mitral  regurgitation directed towards the interatrial septum.  2. Calcified trileaflet aortic valve with normal opening.  Mild aortic regurgitation.  3. Patient in atrial fibrillation; ejection fraction varies  with R-R interval. Normal left ventricular systolic  function. No segmental wall motion abnormalities.  4. Right ventricle not well visualized; right ventricle  appears midlly enlarged with normal right ventricular  systolic function. A device wire is noted in the right  heart.        Assessment and plan  ---------------------------  75 yo  h/o  HT PPM, CAD.  LYMPHEDEMA. HTN  pw low back pain and SOB.      Patient states his low back pain began 5 days ago. He denies any trauma or falls. States the pain was across his entire lower back. Denies  leg   numbness, tingling, leg weakness, urinary or bowl incontinence.      He states the pain has improved but states he is still using his walker to walk instead of his cane.     He states of  the past 4 days he has also noticed DUNCAN. States he is only able to walk short distances with becoming dyspnea  and has  leg swelling.     He denies fever, chills, cough, chest pain, orthopnea, PND.      He states he did not take his lasix for the past few days because it makes him urinate too much.   pt is well known to me with hx of MR, htn, cad, a.fib on no ac ?sec to frequent falls, pvd on pletal, s/p ppm.  chf ?sec to non compliant to meds  decrease iv lasix  repeat echo to check MV  check ppm for any a.fib, if not Ac candidate will consider ?watchman  dvt prophylaxis  fu lytes  MR needs to be addressed, will consider ERENDIRA  continue with lasix, need better bp control  increase losartan/ fu renal function

## 2021-08-21 NOTE — PROGRESS NOTE ADULT - SUBJECTIVE AND OBJECTIVE BOX
[facility]  [place] [address]  [phone]      INTERVAL HPI/OVERNIGHT EVENTS:  pt seen and examined  no complaints  tolerating diet  hgb stable     MEDICATIONS  (STANDING):  aspirin enteric coated 81 milliGRAM(s) Oral daily  cilostazol 100 milliGRAM(s) Oral two times a day  furosemide   Injectable 40 milliGRAM(s) IV Push daily  heparin   Injectable 5000 Unit(s) SubCutaneous every 12 hours  levothyroxine 137 MICROGram(s) Oral daily  losartan 50 milliGRAM(s) Oral daily  metoprolol succinate ER 50 milliGRAM(s) Oral daily  potassium chloride    Tablet ER 20 milliEquivalent(s) Oral daily    MEDICATIONS  (PRN):  oxycodone    5 mG/acetaminophen 325 mG 1 Tablet(s) Oral every 6 hours PRN Moderate Pain (4 - 6)      Allergies    alfuzosin (Hives)  levofloxacin (Hives)  Myrbetriq (Hives)  tamsulosin (Hives)    Intolerances        ROS:   General:  No wt loss, fevers, chills, night sweats, fatigue,   Eyes:  Good vision, no reported pain  ENT:  No sore throat, pain, runny nose, dysphagia  CV:  No pain, palpitations, hypo/hypertension  Resp:  No dyspnea, cough, tachypnea, wheezing  GI:  No pain, No nausea, No vomiting, No diarrhea, No constipation, No weight loss, No fever, No pruritis, No rectal bleeding, No tarry stools, No dysphagia,  :  No pain, bleeding, incontinence, nocturia  Muscle:  No pain, weakness  Neuro:  No weakness, tingling, memory problems  Psych:  No fatigue, insomnia, mood problems, depression  Endocrine:  No polyuria, polydipsia, cold/heat intolerance  Heme:  No petechiae, ecchymosis, easy bruisability  Skin:  No rash, tattoos, scars, edema      PHYSICAL EXAM:   Vital Signs:  Vital Signs Last 24 Hrs  T(C): 36.7 (21 Aug 2021 08:46), Max: 37.5 (20 Aug 2021 17:35)  T(F): 98.1 (21 Aug 2021 08:46), Max: 99.5 (20 Aug 2021 17:35)  HR: 78 (21 Aug 2021 08:46) (70 - 81)  BP: 118/56 (21 Aug 2021 08:46) (103/71 - 143/63)  BP(mean): --  RR: 18 (21 Aug 2021 08:46) (18 - 18)  SpO2: 93% (21 Aug 2021 08:46) (93% - 97%)  Daily     Daily     GENERAL:  Appears stated age,   HEENT:  NC/AT,    CHEST:  Full & symmetric excursion,   HEART:  Regular rhythm,  ABDOMEN:  Soft, non-tender, non-distended,  EXTEREMITIES:  no cyanosis  SKIN:  No rash  NEURO:  Alert,       LABS:                        10.2   4.85  )-----------( 157      ( 21 Aug 2021 07:24 )             31.3     08-21    139  |  103  |  26<H>  ----------------------------<  97  4.1   |  24  |  1.24    Ca    9.2      21 Aug 2021 07:24            RADIOLOGY & ADDITIONAL TESTS:

## 2021-08-21 NOTE — PROGRESS NOTE ADULT - ASSESSMENT
76  year old male          h/o   , CAD s/p stent on ASA, A-fib/ PPM, , hypothyroid, and total left knee replacement       s/p rash across the upper torso/ back  for past week, pruritic on R arm, and rash in the groin     PPM  interrogation  with  W[facility]/   s/p  brief   bursts  of  afib/ , on prior visit         admitted with sob,  from acutr diastolic  chf, relate d to non complaince  with his  lasix    iv lasix   wound care/  lymphedema       CAD/ AFIb  /PPM,/ HTN     prior stent in 2007   on  on asa.  yoptol/ cozaar/ lipitor  h/o  AFIB,    no  a/c     Anemia, , hb stable, had  been seen by  gi  eval dr joann sanchez in past    right leg with distal redness.  not cellulitis / c/c  for past few  months  an d left leg lymphedema  on iv lasix  40 mg   anemia,  d nadira sanchez   follow  weights /  xray spine. 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  w/p  pe r card  follow weights/  pt  ha s improved        f/p wih  dr mikayla dick                               76  year old male          h/o   , CAD s/p stent on ASA, A-fib/ PPM, , hypothyroid, and total left knee replacement       s/p rash across the upper torso/ back  for past week, pruritic on R arm, and rash in the groin     PPM  interrogation  with  WCT/   s/p  brief   bursts  of  afib/ , on prior visit         admitted with sob,  from acutr diastolic  chf, relate d to non complaince  with his  lasix    iv lasix   wound care/  lymphedema       CAD/ AFIb  /PPM,/ HTN     prior stent in 2007   on  on asa.  yoptol/ cozaar/ lipitor  h/o  AFIB,    no  a/c     Anemia, , hb stable, had  been seen by  gi  eval dr joann sanchez in past    right leg with distal redness.  not cellulitis / c/c  for past few  months  an d left leg lymphedema  on iv lasix  40 mg   anemia,  d nadira sanchez   follow  weights /  xray spine. 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  w/p  pe r card  follow weights/  pt  has improved   awiat  ERENDIRA        f/p wih  dr mikayla dick

## 2021-08-22 LAB
ANION GAP SERPL CALC-SCNC: 14 MMOL/L — SIGNIFICANT CHANGE UP (ref 5–17)
BUN SERPL-MCNC: 28 MG/DL — HIGH (ref 7–23)
CALCIUM SERPL-MCNC: 9.5 MG/DL — SIGNIFICANT CHANGE UP (ref 8.4–10.5)
CHLORIDE SERPL-SCNC: 104 MMOL/L — SIGNIFICANT CHANGE UP (ref 96–108)
CO2 SERPL-SCNC: 24 MMOL/L — SIGNIFICANT CHANGE UP (ref 22–31)
CREAT SERPL-MCNC: 1.29 MG/DL — SIGNIFICANT CHANGE UP (ref 0.5–1.3)
GLUCOSE SERPL-MCNC: 96 MG/DL — SIGNIFICANT CHANGE UP (ref 70–99)
HCT VFR BLD CALC: 31.3 % — LOW (ref 39–50)
HGB BLD-MCNC: 9.9 G/DL — LOW (ref 13–17)
MAGNESIUM SERPL-MCNC: 2.2 MG/DL — SIGNIFICANT CHANGE UP (ref 1.6–2.6)
MCHC RBC-ENTMCNC: 29.3 PG — SIGNIFICANT CHANGE UP (ref 27–34)
MCHC RBC-ENTMCNC: 31.6 GM/DL — LOW (ref 32–36)
MCV RBC AUTO: 92.6 FL — SIGNIFICANT CHANGE UP (ref 80–100)
NRBC # BLD: 0 /100 WBCS — SIGNIFICANT CHANGE UP (ref 0–0)
PLATELET # BLD AUTO: 158 K/UL — SIGNIFICANT CHANGE UP (ref 150–400)
POTASSIUM SERPL-MCNC: 4.1 MMOL/L — SIGNIFICANT CHANGE UP (ref 3.5–5.3)
POTASSIUM SERPL-SCNC: 4.1 MMOL/L — SIGNIFICANT CHANGE UP (ref 3.5–5.3)
RBC # BLD: 3.38 M/UL — LOW (ref 4.2–5.8)
RBC # FLD: 14.1 % — SIGNIFICANT CHANGE UP (ref 10.3–14.5)
SODIUM SERPL-SCNC: 142 MMOL/L — SIGNIFICANT CHANGE UP (ref 135–145)
WBC # BLD: 5.08 K/UL — SIGNIFICANT CHANGE UP (ref 3.8–10.5)
WBC # FLD AUTO: 5.08 K/UL — SIGNIFICANT CHANGE UP (ref 3.8–10.5)

## 2021-08-22 RX ORDER — FUROSEMIDE 40 MG
40 TABLET ORAL DAILY
Refills: 0 | Status: DISCONTINUED | OUTPATIENT
Start: 2021-08-22 | End: 2021-08-30

## 2021-08-22 RX ORDER — NYSTATIN CREAM 100000 [USP'U]/G
1 CREAM TOPICAL THREE TIMES A DAY
Refills: 0 | Status: DISCONTINUED | OUTPATIENT
Start: 2021-08-22 | End: 2021-08-30

## 2021-08-22 RX ADMIN — CILOSTAZOL 100 MILLIGRAM(S): 100 TABLET ORAL at 05:43

## 2021-08-22 RX ADMIN — Medication 20 MILLIEQUIVALENT(S): at 11:41

## 2021-08-22 RX ADMIN — Medication 81 MILLIGRAM(S): at 11:41

## 2021-08-22 RX ADMIN — HEPARIN SODIUM 5000 UNIT(S): 5000 INJECTION INTRAVENOUS; SUBCUTANEOUS at 17:51

## 2021-08-22 RX ADMIN — Medication 50 MILLIGRAM(S): at 05:43

## 2021-08-22 RX ADMIN — LOSARTAN POTASSIUM 50 MILLIGRAM(S): 100 TABLET, FILM COATED ORAL at 05:56

## 2021-08-22 RX ADMIN — HEPARIN SODIUM 5000 UNIT(S): 5000 INJECTION INTRAVENOUS; SUBCUTANEOUS at 05:44

## 2021-08-22 RX ADMIN — Medication 137 MICROGRAM(S): at 05:43

## 2021-08-22 RX ADMIN — NYSTATIN CREAM 1 APPLICATION(S): 100000 CREAM TOPICAL at 06:59

## 2021-08-22 RX ADMIN — CILOSTAZOL 100 MILLIGRAM(S): 100 TABLET ORAL at 17:51

## 2021-08-22 RX ADMIN — NYSTATIN CREAM 1 APPLICATION(S): 100000 CREAM TOPICAL at 21:20

## 2021-08-22 RX ADMIN — NYSTATIN CREAM 1 APPLICATION(S): 100000 CREAM TOPICAL at 11:43

## 2021-08-22 RX ADMIN — Medication 40 MILLIGRAM(S): at 05:44

## 2021-08-22 NOTE — PROGRESS NOTE ADULT - ASSESSMENT
76  year old male          h/o   , CAD s/p stent on ASA, A-fib/ PPM, , hypothyroid, and total left knee replacement       s/p rash across the upper torso/ back  for past week, pruritic on R arm, and rash in the groin      prior  PPM  interrogation  with  WCT/   s/p  brief   bursts  of  afib/ , on prior visit         * admitted with sob,  from acute  diastolic  chf, related  to non compliance  with his  lasix    iv lasix   wound care/  lymphedema,  carla left  leg     *  CAD/ AFIb  /PPM,/ HTN     prior stent in 2007   on  on asa.,  toprol,  cozaar/ lipitor,  synthroid   *h/o  AFIB,    no  a/c       *Anemia, , hb stable, had  been seen by  gi  eval dr joann sanchez in past    right leg with distal redness.  not cellulitis / c/c  for past few  months  an d left leg lymphedema   follow  weights /  xray spine. 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

## 2021-08-22 NOTE — PROGRESS NOTE ADULT - SUBJECTIVE AND OBJECTIVE BOX
afebrile    REVIEW OF SYSTEMS:  GEN: no fever,    no chills  RESP: no SOB,   no cough  CVS: no chest pain,   no palpitations  GI: no abdominal pain,   no nausea,   no vomiting,   no constipation,   no diarrhea  : no dysuria,   no frequency  NEURO: no headache,   no dizziness  PSYCH: no depression,   not anxious  Derm : no rash    MEDICATIONS  (STANDING):  aspirin enteric coated 81 milliGRAM(s) Oral daily  cilostazol 100 milliGRAM(s) Oral two times a day  furosemide   Injectable 40 milliGRAM(s) IV Push daily  heparin   Injectable 5000 Unit(s) SubCutaneous every 12 hours  levothyroxine 137 MICROGram(s) Oral daily  losartan 50 milliGRAM(s) Oral daily  metoprolol succinate ER 50 milliGRAM(s) Oral daily  nystatin Powder 1 Application(s) Topical three times a day  potassium chloride    Tablet ER 20 milliEquivalent(s) Oral daily    MEDICATIONS  (PRN):  oxycodone    5 mG/acetaminophen 325 mG 1 Tablet(s) Oral every 6 hours PRN Moderate Pain (4 - 6)      Vital Signs Last 24 Hrs  T(C): 36.7 (22 Aug 2021 05:24), Max: 36.7 (21 Aug 2021 08:46)  T(F): 98.1 (22 Aug 2021 05:24), Max: 98.1 (21 Aug 2021 08:46)  HR: 73 (22 Aug 2021 05:24) (64 - 78)  BP: 147/62 (22 Aug 2021 05:24) (100/61 - 147/62)  BP(mean): --  RR: 18 (22 Aug 2021 05:24) (18 - 18)  SpO2: 95% (22 Aug 2021 05:24) (93% - 95%)  CAPILLARY BLOOD GLUCOSE        I&O's Summary    21 Aug 2021 07:01  -  22 Aug 2021 07:00  --------------------------------------------------------  IN: 420 mL / OUT: 2150 mL / NET: -1730 mL        PHYSICAL EXAM:  HEAD:  Atraumatic, Normocephalic  NECK: Supple, No   JVD  CHEST/LUNG:   no     rales,     no,    rhonchi  HEART: Regular rate and rhythm;         murmur  ABDOMEN: Soft, Nontender, ;   EXTREMITIES:    no    edema  NEUROLOGY:  alert    LABS:                        9.9    5.08  )-----------( 158      ( 22 Aug 2021 07:23 )             31.3     08-22    142  |  104  |  28<H>  ----------------------------<  96  4.1   |  24  |  1.29    Ca    9.5      22 Aug 2021 07:10  Mg     2.2     08-22                              Consultant(s) Notes Reviewed:      Care Discussed with Consultants/Other Providers:

## 2021-08-22 NOTE — PROGRESS NOTE ADULT - SUBJECTIVE AND OBJECTIVE BOX
[facility]  [place] [address]  [phone]      INTERVAL HPI/OVERNIGHT EVENTS:  pt seen and examined  no complaints  tolerating diet  hgb stable     MEDICATIONS  (STANDING):  aspirin enteric coated 81 milliGRAM(s) Oral daily  cilostazol 100 milliGRAM(s) Oral two times a day  furosemide   Injectable 40 milliGRAM(s) IV Push daily  heparin   Injectable 5000 Unit(s) SubCutaneous every 12 hours  levothyroxine 137 MICROGram(s) Oral daily  losartan 50 milliGRAM(s) Oral daily  metoprolol succinate ER 50 milliGRAM(s) Oral daily  potassium chloride    Tablet ER 20 milliEquivalent(s) Oral daily    MEDICATIONS  (PRN):  oxycodone    5 mG/acetaminophen 325 mG 1 Tablet(s) Oral every 6 hours PRN Moderate Pain (4 - 6)      Allergies    alfuzosin (Hives)  levofloxacin (Hives)  Myrbetriq (Hives)  tamsulosin (Hives)    Intolerances        ROS:   General:  No wt loss, fevers, chills, night sweats, fatigue,   Eyes:  Good vision, no reported pain  ENT:  No sore throat, pain, runny nose, dysphagia  CV:  No pain, palpitations, hypo/hypertension  Resp:  No dyspnea, cough, tachypnea, wheezing  GI:  No pain, No nausea, No vomiting, No diarrhea, No constipation, No weight loss, No fever, No pruritis, No rectal bleeding, No tarry stools, No dysphagia,  :  No pain, bleeding, incontinence, nocturia  Muscle:  No pain, weakness  Neuro:  No weakness, tingling, memory problems  Psych:  No fatigue, insomnia, mood problems, depression  Endocrine:  No polyuria, polydipsia, cold/heat intolerance  Heme:  No petechiae, ecchymosis, easy bruisability  Skin:  No rash, tattoos, scars, edema      PHYSICAL EXAM:   Vital Signs:  Vital Signs Last 24 Hrs  T(C): 36.7 (21 Aug 2021 08:46), Max: 37.5 (20 Aug 2021 17:35)  T(F): 98.1 (21 Aug 2021 08:46), Max: 99.5 (20 Aug 2021 17:35)  HR: 78 (21 Aug 2021 08:46) (70 - 81)  BP: 118/56 (21 Aug 2021 08:46) (103/71 - 143/63)  BP(mean): --  RR: 18 (21 Aug 2021 08:46) (18 - 18)  SpO2: 93% (21 Aug 2021 08:46) (93% - 97%)  Daily     Daily     GENERAL:  Appears stated age,   HEENT:  NC/AT,    CHEST:  Full & symmetric excursion,   HEART:  Regular rhythm,  ABDOMEN:  Soft, non-tender, non-distended,  EXTEREMITIES:  no cyanosis  SKIN:  No rash  NEURO:  Alert,       LABS:                        10.2   4.85  )-----------( 157      ( 21 Aug 2021 07:24 )             31.3     08-21    139  |  103  |  26<H>  ----------------------------<  97  4.1   |  24  |  1.24    Ca    9.2      21 Aug 2021 07:24            RADIOLOGY & ADDITIONAL TESTS:

## 2021-08-22 NOTE — PROGRESS NOTE ADULT - SUBJECTIVE AND OBJECTIVE BOX
CARDIOLOGY     PROGRESS  NOTE   ________________________________________________    CHIEF COMPLAINT:Patient is a 76y old  Male who presents with a chief complaint of sob (22 Aug 2021 08:02)  doing better.  	  REVIEW OF SYSTEMS:  CONSTITUTIONAL: No fever, weight loss, or fatigue  EYES: No eye pain, visual disturbances, or discharge  ENT:  No difficulty hearing, tinnitus, vertigo; No sinus or throat pain  NECK: No pain or stiffness  RESPIRATORY: No cough, wheezing, chills or hemoptysis; No Shortness of Breath  CARDIOVASCULAR: No chest pain, palpitations, passing out, dizziness, or leg swelling  GASTROINTESTINAL: No abdominal or epigastric pain. No nausea, vomiting, or hematemesis; No diarrhea or constipation. No melena or hematochezia.  GENITOURINARY: No dysuria, frequency, hematuria, or incontinence  NEUROLOGICAL: No headaches, memory loss, loss of strength, numbness, or tremors  SKIN: No itching, burning, rashes, or lesions   LYMPH Nodes: No enlarged glands  ENDOCRINE: No heat or cold intolerance; No hair loss  MUSCULOSKELETAL: No joint pain or swelling; No muscle, back, or extremity pain  PSYCHIATRIC: No depression, anxiety, mood swings, or difficulty sleeping  HEME/LYMPH: No easy bruising, or bleeding gums  ALLERGY AND IMMUNOLOGIC: No hives or eczema	    [ ] All others negative	  [ ] Unable to obtain    PHYSICAL EXAM:  T(C): 36.6 (08-22-21 @ 08:47), Max: 36.7 (08-22-21 @ 05:24)  HR: 86 (08-22-21 @ 08:47) (64 - 86)  BP: 109/56 (08-22-21 @ 08:47) (100/61 - 147/62)  RR: 18 (08-22-21 @ 08:47) (18 - 18)  SpO2: 93% (08-22-21 @ 08:47) (93% - 95%)  Wt(kg): --  I&O's Summary    21 Aug 2021 07:01  -  22 Aug 2021 07:00  --------------------------------------------------------  IN: 420 mL / OUT: 2150 mL / NET: -1730 mL    22 Aug 2021 07:01  -  22 Aug 2021 09:16  --------------------------------------------------------  IN: 0 mL / OUT: 1000 mL / NET: -1000 mL        Appearance: Normal	  HEENT:   Normal oral mucosa, PERRL, EOMI	  Lymphatic: No lymphadenopathy  Cardiovascular: Normal S1 S2, No JVD, + murmurs, No edema  Respiratory: Lungs clear to auscultation	  Psychiatry: A & O x 3, Mood & affect appropriate  Gastrointestinal:  Soft, Non-tender, + BS	  Skin: No rashes, No ecchymoses, No cyanosis	  Neurologic: Non-focal  Extremities: Normal range of motion, No clubbing, cyanosis or edema  Vascular: Peripheral pulses palpable 2+ bilaterally    MEDICATIONS  (STANDING):  aspirin enteric coated 81 milliGRAM(s) Oral daily  cilostazol 100 milliGRAM(s) Oral two times a day  furosemide   Injectable 40 milliGRAM(s) IV Push daily  heparin   Injectable 5000 Unit(s) SubCutaneous every 12 hours  levothyroxine 137 MICROGram(s) Oral daily  losartan 50 milliGRAM(s) Oral daily  metoprolol succinate ER 50 milliGRAM(s) Oral daily  nystatin Powder 1 Application(s) Topical three times a day  potassium chloride    Tablet ER 20 milliEquivalent(s) Oral daily      TELEMETRY: 	    ECG:  	  RADIOLOGY:  OTHER: 	  	  LABS:	 	    CARDIAC MARKERS:                                9.9    5.08  )-----------( 158      ( 22 Aug 2021 07:23 )             31.3     08-22    142  |  104  |  28<H>  ----------------------------<  96  4.1   |  24  |  1.29    Ca    9.5      22 Aug 2021 07:10  Mg     2.2     08-22      proBNP: Serum Pro-Brain Natriuretic Peptide: 1881 pg/mL (08-18 @ 16:10)    Lipid Profile:   HgA1c:   TSH:         Assessment and plan  ---------------------------  77 yo  h/o  HT PPM, CAD.  LYMPHEDEMA. HTN  pw low back pain and SOB.      Patient states his low back pain began 5 days ago. He denies any trauma or falls. States the pain was across his entire lower back. Denies  leg   numbness, tingling, leg weakness, urinary or bowl incontinence.      He states the pain has improved but states he is still using his walker to walk instead of his cane.     He states of  the past 4 days he has also noticed DUNCAN. States he is only able to walk short distances with becoming dyspnea  and has  leg swelling.     He denies fever, chills, cough, chest pain, orthopnea, PND.      He states he did not take his lasix for the past few days because it makes him urinate too much.   pt is well known to me with hx of MR, htn, cad, a.fib on no ac ?sec to frequent falls, pvd on pletal, s/p ppm.  chf ?sec to non compliant to meds  decrease iv lasix  repeat echo to check MV  check ppm for any a.fib, if not Ac candidate will consider ?watchman  dvt prophylaxis  fu lytes  MR needs to be addressed, will consider ERENDIRA  continue with lasix, need better bp control  increase losartan/ fu renal function  change lasix to po

## 2021-08-23 LAB
ANION GAP SERPL CALC-SCNC: 11 MMOL/L — SIGNIFICANT CHANGE UP (ref 5–17)
BUN SERPL-MCNC: 31 MG/DL — HIGH (ref 7–23)
CALCIUM SERPL-MCNC: 9.8 MG/DL — SIGNIFICANT CHANGE UP (ref 8.4–10.5)
CHLORIDE SERPL-SCNC: 104 MMOL/L — SIGNIFICANT CHANGE UP (ref 96–108)
CO2 SERPL-SCNC: 23 MMOL/L — SIGNIFICANT CHANGE UP (ref 22–31)
CREAT SERPL-MCNC: 1.32 MG/DL — HIGH (ref 0.5–1.3)
GLUCOSE SERPL-MCNC: 96 MG/DL — SIGNIFICANT CHANGE UP (ref 70–99)
MAGNESIUM SERPL-MCNC: 2.1 MG/DL — SIGNIFICANT CHANGE UP (ref 1.6–2.6)
POTASSIUM SERPL-MCNC: 4.1 MMOL/L — SIGNIFICANT CHANGE UP (ref 3.5–5.3)
POTASSIUM SERPL-SCNC: 4.1 MMOL/L — SIGNIFICANT CHANGE UP (ref 3.5–5.3)
SODIUM SERPL-SCNC: 138 MMOL/L — SIGNIFICANT CHANGE UP (ref 135–145)

## 2021-08-23 PROCEDURE — 93312 ECHO TRANSESOPHAGEAL: CPT | Mod: 26

## 2021-08-23 PROCEDURE — 93320 DOPPLER ECHO COMPLETE: CPT | Mod: 26

## 2021-08-23 PROCEDURE — 93325 DOPPLER ECHO COLOR FLOW MAPG: CPT | Mod: 26

## 2021-08-23 RX ORDER — HYDROCORTISONE 1 %
1 OINTMENT (GRAM) TOPICAL
Refills: 0 | Status: COMPLETED | OUTPATIENT
Start: 2021-08-23 | End: 2021-08-26

## 2021-08-23 RX ADMIN — Medication 40 MILLIGRAM(S): at 06:14

## 2021-08-23 RX ADMIN — CILOSTAZOL 100 MILLIGRAM(S): 100 TABLET ORAL at 17:27

## 2021-08-23 RX ADMIN — NYSTATIN CREAM 1 APPLICATION(S): 100000 CREAM TOPICAL at 16:58

## 2021-08-23 RX ADMIN — Medication 137 MICROGRAM(S): at 06:14

## 2021-08-23 RX ADMIN — HEPARIN SODIUM 5000 UNIT(S): 5000 INJECTION INTRAVENOUS; SUBCUTANEOUS at 17:27

## 2021-08-23 RX ADMIN — Medication 50 MILLIGRAM(S): at 08:36

## 2021-08-23 RX ADMIN — NYSTATIN CREAM 1 APPLICATION(S): 100000 CREAM TOPICAL at 04:38

## 2021-08-23 RX ADMIN — Medication 81 MILLIGRAM(S): at 16:57

## 2021-08-23 RX ADMIN — LOSARTAN POTASSIUM 50 MILLIGRAM(S): 100 TABLET, FILM COATED ORAL at 06:14

## 2021-08-23 RX ADMIN — NYSTATIN CREAM 1 APPLICATION(S): 100000 CREAM TOPICAL at 21:32

## 2021-08-23 RX ADMIN — Medication 1 APPLICATION(S): at 17:27

## 2021-08-23 RX ADMIN — Medication 20 MILLIEQUIVALENT(S): at 16:57

## 2021-08-23 RX ADMIN — CILOSTAZOL 100 MILLIGRAM(S): 100 TABLET ORAL at 06:14

## 2021-08-23 NOTE — PROGRESS NOTE ADULT - SUBJECTIVE AND OBJECTIVE BOX
CARDIOLOGY     PROGRESS  NOTE   ________________________________________________    CHIEF COMPLAINT:Patient is a 76y old  Male who presents with a chief complaint of sob (22 Aug 2021 11:31)  doing well, no complain.  	  REVIEW OF SYSTEMS:  CONSTITUTIONAL: No fever, weight loss, or fatigue  EYES: No eye pain, visual disturbances, or discharge  ENT:  No difficulty hearing, tinnitus, vertigo; No sinus or throat pain  NECK: No pain or stiffness  RESPIRATORY: No cough, wheezing, chills or hemoptysis; No Shortness of Breath  CARDIOVASCULAR: No chest pain, palpitations, passing out, dizziness, or leg swelling  GASTROINTESTINAL: No abdominal or epigastric pain. No nausea, vomiting, or hematemesis; No diarrhea or constipation. No melena or hematochezia.  GENITOURINARY: No dysuria, frequency, hematuria, or incontinence  NEUROLOGICAL: No headaches, memory loss, loss of strength, numbness, or tremors  SKIN: No itching, burning, rashes, or lesions   LYMPH Nodes: No enlarged glands  ENDOCRINE: No heat or cold intolerance; No hair loss  MUSCULOSKELETAL: No joint pain or swelling; No muscle, back, or extremity pain  PSYCHIATRIC: No depression, anxiety, mood swings, or difficulty sleeping  HEME/LYMPH: No easy bruising, or bleeding gums  ALLERGY AND IMMUNOLOGIC: No hives or eczema	    [ ] All others negative	  [ ] Unable to obtain    PHYSICAL EXAM:  T(C): 37.1 (08-22-21 @ 23:36), Max: 37.1 (08-22-21 @ 23:36)  HR: 65 (08-23-21 @ 06:10) (65 - 86)  BP: 136/72 (08-23-21 @ 06:10) (109/56 - 145/65)  RR: 16 (08-22-21 @ 23:36) (16 - 18)  SpO2: 92% (08-22-21 @ 23:36) (92% - 95%)  Wt(kg): --  I&O's Summary    22 Aug 2021 07:01  -  23 Aug 2021 07:00  --------------------------------------------------------  IN: 240 mL / OUT: 2100 mL / NET: -1860 mL        Appearance: Normal	  HEENT:   Normal oral mucosa, PERRL, EOMI	  Lymphatic: No lymphadenopathy  Cardiovascular: Normal S1 S2, No JVD, + murmurs, No edema  Respiratory: Lungs clear to auscultation	  Psychiatry: A & O x 3, Mood & affect appropriate  Gastrointestinal:  Soft, Non-tender, + BS	  Skin: No rashes, No ecchymoses, No cyanosis	  Neurologic: Non-focal  Extremities: Normal range of motion, No clubbing, cyanosis or edema  Vascular: Peripheral pulses palpable 2+ bilaterally    MEDICATIONS  (STANDING):  aspirin enteric coated 81 milliGRAM(s) Oral daily  cilostazol 100 milliGRAM(s) Oral two times a day  furosemide    Tablet 40 milliGRAM(s) Oral daily  heparin   Injectable 5000 Unit(s) SubCutaneous every 12 hours  levothyroxine 137 MICROGram(s) Oral daily  losartan 50 milliGRAM(s) Oral daily  metoprolol succinate ER 50 milliGRAM(s) Oral daily  nystatin Powder 1 Application(s) Topical three times a day  potassium chloride    Tablet ER 20 milliEquivalent(s) Oral daily      TELEMETRY: 	    ECG:  	  RADIOLOGY:  OTHER: 	  	  LABS:	 	    CARDIAC MARKERS:                                9.9    5.08  )-----------( 158      ( 22 Aug 2021 07:23 )             31.3     08-23    138  |  104  |  31<H>  ----------------------------<  96  4.1   |  23  |  1.32<H>    Ca    9.8      23 Aug 2021 07:08  Mg     2.1     08-23      proBNP: Serum Pro-Brain Natriuretic Peptide: 1881 pg/mL (08-18 @ 16:10)    Lipid Profile:   HgA1c:   TSH:         Assessment and plan  ---------------------------  75 yo  h/o  HT PPM, CAD.  LYMPHEDEMA. HTN  pw low back pain and SOB.      Patient states his low back pain began 5 days ago. He denies any trauma or falls. States the pain was across his entire lower back. Denies  leg   numbness, tingling, leg weakness, urinary or bowl incontinence.      He states the pain has improved but states he is still using his walker to walk instead of his cane.     He states of  the past 4 days he has also noticed DUNCAN. States he is only able to walk short distances with becoming dyspnea  and has  leg swelling.     He denies fever, chills, cough, chest pain, orthopnea, PND.      He states he did not take his lasix for the past few days because it makes him urinate too much.   pt is well known to me with hx of MR, htn, cad, a.fib on no ac ?sec to frequent falls, pvd on pletal, s/p ppm.  chf ?sec to non compliant to meds  decrease iv lasix  repeat echo to check MV  check ppm for any a.fib, if not Ac candidate will consider ?watchman  dvt prophylaxis  fu lytes  MR needs to be addressed, will consider ERENDIRA  continue with lasix,  increase losartan/ fu renal function  change lasix to po  ERENDIRA today

## 2021-08-23 NOTE — PRE-OP CHECKLIST - SELECT TESTS ORDERED
BMP/CBC/CMP/PT/PTT/Type and Cross/Type and Screen/EKG/Results in MD note/COVID-19 BMP/CBC/CMP/PT/PTT/EKG/Results in MD note/COVID-19

## 2021-08-23 NOTE — PHYSICAL EXAM
[2+] : left 2+ [Ankle Swelling (On Exam)] : present [] : bilaterally [Ankle Swelling Bilaterally] : severe Constricted

## 2021-08-23 NOTE — PRE-ANESTHESIA EVALUATION ADULT - NSANTHPMHFT_GEN_ALL_CORE
CAD s/p stent on ASA, A-fib/ PPM, , hypothyroid, admitted for acute HF 2/2 noncompliace with  lasix, Pacemaker  Medtronic - Model RVDR01 1/10/2012 Stented coronary artery drug eluding stent 5/2007, asbestosis, CAD s/p stent on ASA, A-fib/ PPM, , hypothyroid, admitted for acute HF 2/2 non-compliace with  lasix now s/p diuresis, Pacemaker  Medtronic - Model RVDR01 1/10/2012(evaluated within last year), Stented coronary artery drug eluding stent 5/2007, asbestosis. ERENDIRA today to evaluate for mod-severe MVP

## 2021-08-23 NOTE — PROGRESS NOTE ADULT - SUBJECTIVE AND OBJECTIVE BOX
afberile    REVIEW OF SYSTEMS:  GEN: no fever,    no chills  RESP: no SOB,   no cough  CVS: no chest pain,   no palpitations  GI: no abdominal pain,   no nausea,   no vomiting,   no constipation,   no diarrhea  : no dysuria,   no frequency  NEURO: no headache,   no dizziness  PSYCH: no depression,   not anxious  Derm : no rash    MEDICATIONS  (STANDING):  aspirin enteric coated 81 milliGRAM(s) Oral daily  cilostazol 100 milliGRAM(s) Oral two times a day  furosemide    Tablet 40 milliGRAM(s) Oral daily  heparin   Injectable 5000 Unit(s) SubCutaneous every 12 hours  levothyroxine 137 MICROGram(s) Oral daily  losartan 50 milliGRAM(s) Oral daily  metoprolol succinate ER 50 milliGRAM(s) Oral daily  nystatin Powder 1 Application(s) Topical three times a day  potassium chloride    Tablet ER 20 milliEquivalent(s) Oral daily    MEDICATIONS  (PRN):  oxycodone    5 mG/acetaminophen 325 mG 1 Tablet(s) Oral every 6 hours PRN Moderate Pain (4 - 6)      Vital Signs Last 24 Hrs  T(C): 37.2 (23 Aug 2021 08:33), Max: 37.2 (23 Aug 2021 08:33)  T(F): 98.9 (23 Aug 2021 08:33), Max: 98.9 (23 Aug 2021 08:33)  HR: 72 (23 Aug 2021 08:33) (65 - 80)  BP: 144/69 (23 Aug 2021 08:33) (126/63 - 145/65)  BP(mean): --  RR: 18 (23 Aug 2021 08:33) (16 - 18)  SpO2: 97% (23 Aug 2021 08:33) (92% - 97%)  CAPILLARY BLOOD GLUCOSE        I&O's Summary    22 Aug 2021 07:01  -  23 Aug 2021 07:00  --------------------------------------------------------  IN: 240 mL / OUT: 2100 mL / NET: -1860 mL    23 Aug 2021 07:01  -  23 Aug 2021 08:49  --------------------------------------------------------  IN: 0 mL / OUT: 450 mL / NET: -450 mL        PHYSICAL EXAM:  HEAD:  Atraumatic, Normocephalic  NECK: Supple, No   JVD  CHEST/LUNG:   no     rales,     no,    rhonchi  HEART: Regular rate and rhythm;         murmur  ABDOMEN: Soft, Nontender, ;   EXTREMITIES:   no     edema  NEUROLOGY:  alert    LABS:                        9.9    5.08  )-----------( 158      ( 22 Aug 2021 07:23 )             31.3     08-23    138  |  104  |  31<H>  ----------------------------<  96  4.1   |  23  |  1.32<H>    Ca    9.8      23 Aug 2021 07:08  Mg     2.1     08-23                              Consultant(s) Notes Reviewed:      Care Discussed with Consultants/Other Providers:

## 2021-08-23 NOTE — PROGRESS NOTE ADULT - ASSESSMENT
76  year old male          h/o   , CAD s/p stent on ASA, A-fib/ PPM, , hypothyroid, and total left knee replacement       s/p rash across the upper torso/ back  for past week, pruritic on R arm, and rash in the groin      prior  PPM  interrogation  with  [facility]/   s/p  brief   bursts  of  afib/ , on prior visit         * admitted with sob,  from acute  diastolic  chf, related  to non compliance  with his  lasix    iv lasix   wound care/  lymphedema,  carla left  leg     *  CAD/ AFIb  /PPM,/ HTN     prior stent in 2007   on  on asa.,  toprol,  cozaar/ lipitor,  synthroid   *h/o  AFIB,    no  a/c       *Anemia, , hb stable, had  been seen by  gi  eval dr joann sanchez in past    right leg with distal redness.  not cellulitis / c/c  for past few  months  an d left leg lymphedema   follow  weights /  xray spine. 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

## 2021-08-23 NOTE — PROGRESS NOTE ADULT - SUBJECTIVE AND OBJECTIVE BOX
[facility]  [place] [address]  [phone]      INTERVAL HPI/OVERNIGHT EVENTS:  pt seen and examined  no complaints  tolerating diet, no N/V/D  hgb stable     MEDICATIONS  (STANDING):  aspirin enteric coated 81 milliGRAM(s) Oral daily  cilostazol 100 milliGRAM(s) Oral two times a day  furosemide   Injectable 40 milliGRAM(s) IV Push daily  heparin   Injectable 5000 Unit(s) SubCutaneous every 12 hours  levothyroxine 137 MICROGram(s) Oral daily  losartan 50 milliGRAM(s) Oral daily  metoprolol succinate ER 50 milliGRAM(s) Oral daily  potassium chloride    Tablet ER 20 milliEquivalent(s) Oral daily    MEDICATIONS  (PRN):  oxycodone    5 mG/acetaminophen 325 mG 1 Tablet(s) Oral every 6 hours PRN Moderate Pain (4 - 6)      Allergies    alfuzosin (Hives)  levofloxacin (Hives)  Myrbetriq (Hives)  tamsulosin (Hives)    Intolerances        ROS:   General:  No wt loss, fevers, chills, night sweats, fatigue,   Eyes:  Good vision, no reported pain  ENT:  No sore throat, pain, runny nose, dysphagia  CV:  No pain, palpitations, hypo/hypertension  Resp:  No dyspnea, cough, tachypnea, wheezing  GI:  No pain, No nausea, No vomiting, No diarrhea, No constipation, No weight loss, No fever, No pruritis, No rectal bleeding, No tarry stools, No dysphagia,  :  No pain, bleeding, incontinence, nocturia  Muscle:  No pain, weakness  Neuro:  No weakness, tingling, memory problems  Psych:  No fatigue, insomnia, mood problems, depression  Endocrine:  No polyuria, polydipsia, cold/heat intolerance  Heme:  No petechiae, ecchymosis, easy bruisability  Skin:  No rash, tattoos, scars, edema      PHYSICAL EXAM:   Vital Signs:  Vital Signs Last 24 Hrs  T(C): 36.7 (21 Aug 2021 08:46), Max: 37.5 (20 Aug 2021 17:35)  T(F): 98.1 (21 Aug 2021 08:46), Max: 99.5 (20 Aug 2021 17:35)  HR: 78 (21 Aug 2021 08:46) (70 - 81)  BP: 118/56 (21 Aug 2021 08:46) (103/71 - 143/63)  BP(mean): --  RR: 18 (21 Aug 2021 08:46) (18 - 18)  SpO2: 93% (21 Aug 2021 08:46) (93% - 97%)  Daily     Daily     GENERAL:  Appears stated age,   HEENT:  NC/AT,    CHEST:  Full & symmetric excursion,   HEART:  Regular rhythm,  ABDOMEN:  Soft, non-tender, non-distended,  EXTEREMITIES:  no cyanosis  SKIN:  No rash  NEURO:  Alert,       LABS:                        10.2   4.85  )-----------( 157      ( 21 Aug 2021 07:24 )             31.3     08-21    139  |  103  |  26<H>  ----------------------------<  97  4.1   |  24  |  1.24    Ca    9.2      21 Aug 2021 07:24            RADIOLOGY & ADDITIONAL TESTS:

## 2021-08-23 NOTE — PROGRESS NOTE ADULT - ASSESSMENT
anemia  afib  cad    no overt gi bleed  denies melena/hematochezia  hgb stable  cont regular diet  had egd/colon 12/2020 (cherdelmy yang)  no objection to continue asa/pletal  ERENDIRA today   cardio following   will follow   dw patient

## 2021-08-24 ENCOUNTER — TRANSCRIPTION ENCOUNTER (OUTPATIENT)
Age: 76
End: 2021-08-24

## 2021-08-24 ENCOUNTER — RESULT REVIEW (OUTPATIENT)
Age: 76
End: 2021-08-24

## 2021-08-24 LAB
ANION GAP SERPL CALC-SCNC: 12 MMOL/L — SIGNIFICANT CHANGE UP (ref 5–17)
BUN SERPL-MCNC: 31 MG/DL — HIGH (ref 7–23)
CALCIUM SERPL-MCNC: 9.5 MG/DL — SIGNIFICANT CHANGE UP (ref 8.4–10.5)
CALCOFLUOR WHITE SPEC: SIGNIFICANT CHANGE UP
CHLORIDE SERPL-SCNC: 101 MMOL/L — SIGNIFICANT CHANGE UP (ref 96–108)
CO2 SERPL-SCNC: 25 MMOL/L — SIGNIFICANT CHANGE UP (ref 22–31)
CREAT SERPL-MCNC: 1.2 MG/DL — SIGNIFICANT CHANGE UP (ref 0.5–1.3)
GLUCOSE SERPL-MCNC: 100 MG/DL — HIGH (ref 70–99)
POTASSIUM SERPL-MCNC: 4.2 MMOL/L — SIGNIFICANT CHANGE UP (ref 3.5–5.3)
POTASSIUM SERPL-SCNC: 4.2 MMOL/L — SIGNIFICANT CHANGE UP (ref 3.5–5.3)
SARS-COV-2 RNA SPEC QL NAA+PROBE: SIGNIFICANT CHANGE UP
SODIUM SERPL-SCNC: 138 MMOL/L — SIGNIFICANT CHANGE UP (ref 135–145)
SPECIMEN SOURCE: SIGNIFICANT CHANGE UP

## 2021-08-24 PROCEDURE — 93454 CORONARY ARTERY ANGIO S&I: CPT | Mod: 26

## 2021-08-24 PROCEDURE — 99152 MOD SED SAME PHYS/QHP 5/>YRS: CPT

## 2021-08-24 RX ADMIN — NYSTATIN CREAM 1 APPLICATION(S): 100000 CREAM TOPICAL at 05:58

## 2021-08-24 RX ADMIN — HEPARIN SODIUM 5000 UNIT(S): 5000 INJECTION INTRAVENOUS; SUBCUTANEOUS at 05:57

## 2021-08-24 RX ADMIN — LOSARTAN POTASSIUM 50 MILLIGRAM(S): 100 TABLET, FILM COATED ORAL at 08:32

## 2021-08-24 RX ADMIN — NYSTATIN CREAM 1 APPLICATION(S): 100000 CREAM TOPICAL at 14:12

## 2021-08-24 RX ADMIN — Medication 1 APPLICATION(S): at 21:14

## 2021-08-24 RX ADMIN — Medication 40 MILLIGRAM(S): at 05:57

## 2021-08-24 RX ADMIN — Medication 137 MICROGRAM(S): at 05:58

## 2021-08-24 RX ADMIN — Medication 1 APPLICATION(S): at 05:58

## 2021-08-24 RX ADMIN — CILOSTAZOL 100 MILLIGRAM(S): 100 TABLET ORAL at 05:57

## 2021-08-24 RX ADMIN — Medication 20 MILLIEQUIVALENT(S): at 12:14

## 2021-08-24 RX ADMIN — CILOSTAZOL 100 MILLIGRAM(S): 100 TABLET ORAL at 21:14

## 2021-08-24 RX ADMIN — Medication 50 MILLIGRAM(S): at 08:32

## 2021-08-24 RX ADMIN — NYSTATIN CREAM 1 APPLICATION(S): 100000 CREAM TOPICAL at 21:14

## 2021-08-24 RX ADMIN — Medication 81 MILLIGRAM(S): at 12:15

## 2021-08-24 NOTE — PROGRESS NOTE ADULT - SUBJECTIVE AND OBJECTIVE BOX
[facility]  [place] [address] [phone]      INTERVAL HPI/OVERNIGHT EVENTS:  pt seen and examined. no complaints  s/p ERENDIRA with severe MR  tolerating diet, no N/V/D  hgb stable     MEDICATIONS  (STANDING):  aspirin enteric coated 81 milliGRAM(s) Oral daily  cilostazol 100 milliGRAM(s) Oral two times a day  furosemide   Injectable 40 milliGRAM(s) IV Push daily  heparin   Injectable 5000 Unit(s) SubCutaneous every 12 hours  levothyroxine 137 MICROGram(s) Oral daily  losartan 50 milliGRAM(s) Oral daily  metoprolol succinate ER 50 milliGRAM(s) Oral daily  potassium chloride    Tablet ER 20 milliEquivalent(s) Oral daily    MEDICATIONS  (PRN):  oxycodone    5 mG/acetaminophen 325 mG 1 Tablet(s) Oral every 6 hours PRN Moderate Pain (4 - 6)      Allergies    alfuzosin (Hives)  levofloxacin (Hives)  Myrbetriq (Hives)  tamsulosin (Hives)    Intolerances        ROS:   General:  No wt loss, fevers, chills, night sweats, fatigue,   Eyes:  Good vision, no reported pain  ENT:  No sore throat, pain, runny nose, dysphagia  CV:  No pain, palpitations, hypo/hypertension  Resp:  No dyspnea, cough, tachypnea, wheezing  GI:  No pain, No nausea, No vomiting, No diarrhea, No constipation, No weight loss, No fever, No pruritis, No rectal bleeding, No tarry stools, No dysphagia,  :  No pain, bleeding, incontinence, nocturia  Muscle:  No pain, weakness  Neuro:  No weakness, tingling, memory problems  Psych:  No fatigue, insomnia, mood problems, depression  Endocrine:  No polyuria, polydipsia, cold/heat intolerance  Heme:  No petechiae, ecchymosis, easy bruisability  Skin:  No rash, tattoos, scars, edema      PHYSICAL EXAM:   Vital Signs:  Vital Signs Last 24 Hrs  T(C): 36.7 (21 Aug 2021 08:46), Max: 37.5 (20 Aug 2021 17:35)  T(F): 98.1 (21 Aug 2021 08:46), Max: 99.5 (20 Aug 2021 17:35)  HR: 78 (21 Aug 2021 08:46) (70 - 81)  BP: 118/56 (21 Aug 2021 08:46) (103/71 - 143/63)  BP(mean): --  RR: 18 (21 Aug 2021 08:46) (18 - 18)  SpO2: 93% (21 Aug 2021 08:46) (93% - 97%)  Daily     Daily     GENERAL:  Appears stated age,   HEENT:  NC/AT,    CHEST:  Full & symmetric excursion,   HEART:  Regular rhythm,  ABDOMEN:  Soft, non-tender, non-distended,  EXTEREMITIES:  no cyanosis  SKIN:  No rash  NEURO:  Alert,       LABS:                        10.2   4.85  )-----------( 157      ( 21 Aug 2021 07:24 )             31.3     08-21    139  |  103  |  26<H>  ----------------------------<  97  4.1   |  24  |  1.24    Ca    9.2      21 Aug 2021 07:24            RADIOLOGY & ADDITIONAL TESTS:

## 2021-08-24 NOTE — DISCHARGE NOTE PROVIDER - NSDCCPCAREPLAN_GEN_ALL_CORE_FT
PRINCIPAL DISCHARGE DIAGNOSIS  Diagnosis: Mitral insufficiency  Assessment and Plan of Treatment:       SECONDARY DISCHARGE DIAGNOSES  Diagnosis: Coronary disease  Assessment and Plan of Treatment:

## 2021-08-24 NOTE — DISCHARGE NOTE PROVIDER - NSDCFUADDINST_GEN_ALL_CORE_FT
Podiatry Discharge Instructions:  Follow up: Please follow up with Dr. Hernandez within 1 week of discharge from the hospital, please call [phone] for appointment and discuss that you recently were seen in the hospital.  Wound Care:  Please apply ciclopirox to R foot interspaces and dress w/ 4x4 gauze and light citlali daily.   Weight bearing: Please weight bear as tolerated in a surgical shoe.  Antibiotics: Please continue as instructed. Podiatry Discharge Instructions:  Follow up: Please follow up with Dr. Hernandez within 1 week of discharge from the hospital, please call [phone] for appointment and discuss that you recently were seen in the hospital.  Wound Care:  Please apply ciclopirox to R foot interspaces and dress w/ 4x4 gauze and light citlali daily.       ***  Check INR   2x week  keep INR    2.5  ** NO meals  In Bed   -  shower Daily  Weight bearing: Please weight bear as tolerated in a surgical shoe.  Antibiotics: Please continue as instructed.

## 2021-08-24 NOTE — PROGRESS NOTE ADULT - SUBJECTIVE AND OBJECTIVE BOX
CARDIOLOGY     PROGRESS  NOTE   ________________________________________________    CHIEF COMPLAINT:Patient is a 76y old  Male who presents with a chief complaint of sob (23 Aug 2021 11:29)  no complain.  	  REVIEW OF SYSTEMS:  CONSTITUTIONAL: No fever, weight loss, or fatigue  EYES: No eye pain, visual disturbances, or discharge  ENT:  No difficulty hearing, tinnitus, vertigo; No sinus or throat pain  NECK: No pain or stiffness  RESPIRATORY: No cough, wheezing, chills or hemoptysis; No Shortness of Breath  CARDIOVASCULAR: No chest pain, palpitations, passing out, dizziness, or leg swelling  GASTROINTESTINAL: No abdominal or epigastric pain. No nausea, vomiting, or hematemesis; No diarrhea or constipation. No melena or hematochezia.  GENITOURINARY: No dysuria, frequency, hematuria, or incontinence  NEUROLOGICAL: No headaches, memory loss, loss of strength, numbness, or tremors  SKIN: No itching, burning, rashes, or lesions   LYMPH Nodes: No enlarged glands  ENDOCRINE: No heat or cold intolerance; No hair loss  MUSCULOSKELETAL: No joint pain or swelling; No muscle, back, or extremity pain  PSYCHIATRIC: No depression, anxiety, mood swings, or difficulty sleeping  HEME/LYMPH: No easy bruising, or bleeding gums  ALLERGY AND IMMUNOLOGIC: No hives or eczema	    [ ] All others negative	  [ ] Unable to obtain    PHYSICAL EXAM:  T(C): 36.8 (08-24-21 @ 00:01), Max: 37.2 (08-23-21 @ 08:33)  HR: 67 (08-24-21 @ 00:01) (67 - 72)  BP: 106/61 (08-24-21 @ 00:01) (106/61 - 144/69)  RR: 18 (08-24-21 @ 00:01) (18 - 18)  SpO2: 96% (08-24-21 @ 00:01) (93% - 97%)  Wt(kg): --  I&O's Summary    23 Aug 2021 07:01  -  24 Aug 2021 07:00  --------------------------------------------------------  IN: 240 mL / OUT: 2000 mL / NET: -1760 mL        Appearance: Normal	  HEENT:   Normal oral mucosa, PERRL, EOMI	  Lymphatic: No lymphadenopathy  Cardiovascular: Normal S1 S2, No JVD, + murmurs, No edema  Respiratory: Lungs clear to auscultation	  Psychiatry: A & O x 3, Mood & affect appropriate  Gastrointestinal:  Soft, Non-tender, + BS	  Skin: No rashes, No ecchymoses, No cyanosis	  Neurologic: Non-focal  Extremities: Normal range of motion, No clubbing, cyanosis or edema  Vascular: Peripheral pulses palpable 2+ bilaterally    MEDICATIONS  (STANDING):  aspirin enteric coated 81 milliGRAM(s) Oral daily  cilostazol 100 milliGRAM(s) Oral two times a day  furosemide    Tablet 40 milliGRAM(s) Oral daily  heparin   Injectable 5000 Unit(s) SubCutaneous every 12 hours  hydrocortisone 1% Cream 1 Application(s) Topical two times a day  levothyroxine 137 MICROGram(s) Oral daily  losartan 50 milliGRAM(s) Oral daily  metoprolol succinate ER 50 milliGRAM(s) Oral daily  nystatin Powder 1 Application(s) Topical three times a day  potassium chloride    Tablet ER 20 milliEquivalent(s) Oral daily      TELEMETRY: 	    ECG:  	  RADIOLOGY:  OTHER: 	  	  LABS:	 	    CARDIAC MARKERS:            08-24    138  |  101  |  31<H>  ----------------------------<  100<H>  4.2   |  25  |  1.20    Ca    9.5      24 Aug 2021 07:02  Mg     2.1     08-23      proBNP: Serum Pro-Brain Natriuretic Peptide: 1881 pg/mL (08-18 @ 16:10)    Lipid Profile:   HgA1c:   TSH:     < from: Transesophageal Echocardiogram w/o TTE (08.23.21 @ 20:15) >  1. 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 Severe eccentric mainly  anterior mitral regurgitation. Flow reversal is seen in the  pulmonary veins.  2. Calcified trileaflet aortic valve with normal opening.  Mild-moderate aortic regurgitation.  3. Normal left ventricular systolic function.  4. A device wire is noted in the right heart.  5. Normal right ventricular size and function.    < end of copied text >      Assessment and plan  ---------------------------  77 yo  h/o  HT PPM, CAD.  LYMPHEDEMA. HTN  pw low back pain and SOB.      Patient states his low back pain began 5 days ago. He denies any trauma or falls. States the pain was across his entire lower back. Denies  leg   numbness, tingling, leg weakness, urinary or bowl incontinence.      He states the pain has improved but states he is still using his walker to walk instead of his cane.     He states of  the past 4 days he has also noticed DUNCAN. States he is only able to walk short distances with becoming dyspnea  and has  leg swelling.     He denies fever, chills, cough, chest pain, orthopnea, PND.      He states he did not take his lasix for the past few days because it makes him urinate too much.   pt is well known to me with hx of MR, htn, cad, a.fib on no ac ?sec to frequent falls, pvd on pletal, s/p ppm.  chf ?sec to non compliant to meds  decrease iv lasix  repeat echo to check MV  check ppm for any a.fib, if not Ac candidate will consider ?watchman  dvt prophylaxis  fu lytes  MR needs to be addressed, will consider ERENDIRA  continue with lasix,  increase losartan/ fu renal function  change lasix to po  ERENDIRA noted, severe MT not clip structure  R/L heart cath , will get ct surgery

## 2021-08-24 NOTE — PROGRESS NOTE ADULT - ASSESSMENT
76  year old male          h/o   , CAD s/p stent on ASA, A-fib/ PPM, , hypothyroid, and total left knee replacement       s/p rash across the upper torso/ back  for past week, pruritic on R arm, and rash in the groin      prior  PPM  interrogation  with  [facility]/   s/p  brief   bursts  of  afib/ , on prior visit         * admitted with sob,  from acute  diastolic  chf, related  to non compliance  with his  lasix    iv lasix   wound care/  lymphedema,  carla left  leg     *  CAD/ AFIb  /PPM,/ HTN     prior stent in 2007   on  on asa.,  toprol,  cozaar/ lipitor,  synthroid   *h/o  AFIB,    no  a/c       *Anemia, , hb stable, had  been seen by  gi  eval dr joann sanchez in past    right leg with distal redness.  not cellulitis / c/c  for past few  months  an d left leg lymphedema   follow  weights /  xray spine. horse  shoe left kidney/ OA  of  spine  on    lasix,  an d ha  simproved    * Echo, normal  ef/ severe MR     ERENDIRA ,  severe  MR  plan awiating cath  and  CT surg    eval         f/p wi  dr mikayla dick

## 2021-08-24 NOTE — CONSULT NOTE ADULT - ASSESSMENT
75 yo M pt w/ R foot scaly lesion  - pt seen and evaluated  - R foot 1st interspace chronic and persistent scaling skin; no open lesions; no wily signs of infection   - using aspetic technique, scaling skin of 1st interspace debrided down to dermis, but not beyond dermis; scaling skin collected and sent to pathology for PAS and micro for KOH   - pod plan local wound care pending pathology and micro results   - seen w/ attending  75 yo M pt w/ R foot scaly lesion  - pt seen and evaluated  - R foot 1st interspace chronic and persistent scaling skin; no open lesions; no wily signs of infection   - using aspetic technique, scaling skin of 1st interspace debrided down to dermis, but not beyond dermis; scaling skin collected and sent to pathology for PAS and micro for KOH   - recommend continue Ciclopirox cream application to right foot 1st interspace daily  - stable for discharge from podiatric standpoint, will f/u fungal data as outpatient  - pt to f/u w/ Dr. Hernandez within 1 week of discharge, call [phone] for appointment  - instructions for d/c in discharge note provider  - seen w/ attending

## 2021-08-24 NOTE — PROGRESS NOTE ADULT - SUBJECTIVE AND OBJECTIVE BOX
afberile    REVIEW OF SYSTEMS:  GEN: no fever,    no chills  RESP: no SOB,   no cough  CVS: no chest pain,   no palpitations  GI: no abdominal pain,   no nausea,   no vomiting,   no constipation,   no diarrhea  : no dysuria,   no frequency  NEURO: no headache,   no dizziness  PSYCH: no depression,   not anxious  Derm : no rash    MEDICATIONS  (STANDING):  aspirin enteric coated 81 milliGRAM(s) Oral daily  cilostazol 100 milliGRAM(s) Oral two times a day  furosemide    Tablet 40 milliGRAM(s) Oral daily  heparin   Injectable 5000 Unit(s) SubCutaneous every 12 hours  hydrocortisone 1% Cream 1 Application(s) Topical two times a day  levothyroxine 137 MICROGram(s) Oral daily  losartan 50 milliGRAM(s) Oral daily  metoprolol succinate ER 50 milliGRAM(s) Oral daily  nystatin Powder 1 Application(s) Topical three times a day  potassium chloride    Tablet ER 20 milliEquivalent(s) Oral daily    MEDICATIONS  (PRN):  oxycodone    5 mG/acetaminophen 325 mG 1 Tablet(s) Oral every 6 hours PRN Moderate Pain (4 - 6)      Vital Signs Last 24 Hrs  T(C): 36.8 (24 Aug 2021 00:01), Max: 37.2 (23 Aug 2021 08:33)  T(F): 98.2 (24 Aug 2021 00:01), Max: 98.9 (23 Aug 2021 08:33)  HR: 67 (24 Aug 2021 00:01) (67 - 72)  BP: 106/61 (24 Aug 2021 00:01) (106/61 - 144/69)  BP(mean): --  RR: 18 (24 Aug 2021 00:01) (18 - 18)  SpO2: 96% (24 Aug 2021 00:01) (93% - 97%)  CAPILLARY BLOOD GLUCOSE        I&O's Summary    23 Aug 2021 07:01  -  24 Aug 2021 07:00  --------------------------------------------------------  IN: 240 mL / OUT: 2000 mL / NET: -1760 mL        PHYSICAL EXAM:  HEAD:  Atraumatic, Normocephalic  NECK: Supple, No   JVD  CHEST/LUNG:   no     rales,     no,    rhonchi  HEART: Regular rate and rhythm;         murmur  ABDOMEN: Soft, Nontender, ;   EXTREMITIES:    less    edema  NEUROLOGY:  alert    LABS:    08-24    138  |  101  |  31<H>  ----------------------------<  100<H>  4.2   |  25  |  1.20    Ca    9.5      24 Aug 2021 07:02  Mg     2.1     08-23                              Consultant(s) Notes Reviewed:      Care Discussed with Consultants/Other Providers:

## 2021-08-24 NOTE — DISCHARGE NOTE PROVIDER - CARE PROVIDER_API CALL
Raúl Daniel)  Surgery; Thoracic and Cardiac Surgery  [address] [place] [address]  Phone: [phone]  Fax: [phone]  Follow Up Time:

## 2021-08-24 NOTE — DISCHARGE NOTE PROVIDER - HOSPITAL COURSE
On 21, pt underwent MVR-t/CABG x 2 w/ LIMA  intraop & post-op bleeding w/ multiple products given ? TRALI-hypoxic- lasix gtt-     EP increased back up rate to 60 - .    gtt - rapid afib -  d/c'd, bb   lasix gtt d/c'd - EXTUBATED.    SDU - BB Increased.  -battery end-of-life - will change battery on  if INR < 1.8 per DR. Christensen   VSS afib 90s  - low dose coumadin - NPO for generator change PPM w/ DR. Christensen.  ck inr in am, d/c plan    Generator change today. Change to toprol xl 100, Ambulation, Anticoagulation    s/p ppm generator change yesterday, dressing cdi. Pt with LLL consolidation, collapse vs effusion, ct non con today, d/c pw   CT stble, reviewed with Dr grider, no further intervention.  Started on diuretics Transfer to floor. Increase PT pw out  9/10 Continue with diuresis, remains +2Kg above preop weight. Anticoagulation for afib and MVR. Pending rehab placement. Chest PT for LLL atelectasis   VSS, INR 1.85 Coumadin 5mg. Aldactone 25 BID added for edema. Plan for discharge to Rehab Monday.    VSS, INR 2.07, Coumadin 5mg. Pt still edematous, lasix changed to torsemide 20mg daily. COVID negative. CXR: Right lung clear, persistent moderate left pleural effusion.   VSS; continue diuresis/ ace wrap LE pulm toilet; repeat chest xray in am; increase activity as tolerated; INR 2.2- coumadin 4 mg this evening  rehab when stable ? wed; ck covid tue    VSS INR 2.5 Coumadin 4 tonight   negative 1 liter ECHO for today COVID  negative   for discharge to rehab  9/15 VSS INR 2.54 Coumadin ? tonight negative  - 1375   ECHO reveal s small organized pericardial effusion no evidence of tamponade - US  left chest today low suspicion for effusion likely atelectasi     dc to rehab  on diuretics

## 2021-08-24 NOTE — PROGRESS NOTE ADULT - ASSESSMENT
anemia  afib  cad    no overt gi bleed  denies melena/hematochezia  hgb stable  cont regular diet  had egd/colon 12/2020 (cher trey)  no objection to continue asa/pletal  s/p ERENDIRA, severe MR  cardio following   will follow   dw patient

## 2021-08-24 NOTE — DISCHARGE NOTE PROVIDER - NSDCMRMEDTOKEN_GEN_ALL_CORE_FT
aspirin 81 mg oral tablet: 1  orally once a day  cilostazol 100 mg oral tablet: 1 tab(s) orally 2 times a day  furosemide 20 mg oral tablet: 1 tab(s) orally once a day  losartan 50 mg oral tablet: 1 tab(s) orally once a day  Lotrimin AF Jock Itch 1% topical cream: Apply topically to affected area 3 times a day   metoprolol succinate 100 mg oral tablet, extended release: 1 tab(s) orally once a day  oxycodone-acetaminophen 5 mg-325 mg oral tablet: 1 tab(s) orally every 6 hours, As Needed MDD:4   potassium chloride 20 mEq oral granule, extended release: 1  orally once a day  Synthroid 137 mcg (0.137 mg) oral tablet: 1 tab(s) orally once a day  Vitamin C 500 mg oral tablet: 1 tab(s) orally once a day  Vitamin D3 2000 intl units (50 mcg) oral tablet: 1  orally once a day  Vytorin 10 mg-40 mg oral tablet: 1 tab(s) orally once a day   acetaminophen 325 mg oral tablet: 2 tab(s) orally every 6 hours, As needed, Mild Pain (1 - 3)  aspirin 81 mg oral tablet: 1  orally once a day  cilostazol 100 mg oral tablet: 1 tab(s) orally 2 times a day  clotrimazole 1% topical cream: 1 application topically 2 times a day  escitalopram 5 mg oral tablet: 1 tab(s) orally once a day  ferrous sulfate 325 mg (65 mg elemental iron) oral tablet: 1 tab(s) orally once a day  folic acid 1 mg oral tablet: 1 tab(s) orally once a day  furosemide 20 mg oral tablet: 1 tab(s) orally once a day  hydrocortisone 1% topical cream: 1 application topically once a day  losartan 50 mg oral tablet: 1 tab(s) orally once a day  Lotrimin AF Jock Itch 1% topical cream: Apply topically to affected area 3 times a day   metoprolol succinate 100 mg oral tablet, extended release: 1 tab(s) orally once a day  metoprolol succinate 100 mg oral tablet, extended release: 1 tab(s) orally once a day  nystatin 100,000 units/g topical cream: 1 application topically 2 times a day  oxycodone-acetaminophen 5 mg-325 mg oral tablet: 1 tab(s) orally every 6 hours, As Needed MDD:4   potassium chloride 20 mEq oral granule, extended release: 1  orally once a day  spironolactone 25 mg oral tablet: 1 tab(s) orally every 12 hours  Synthroid 137 mcg (0.137 mg) oral tablet: 1 tab(s) orally once a day  torsemide 20 mg oral tablet: 1 tab(s) orally once a day  Vitamin C 500 mg oral tablet: 1 tab(s) orally once a day  Vitamin D3 2000 intl units (50 mcg) oral tablet: 1  orally once a day  Vytorin 10 mg-40 mg oral tablet: 1 tab(s) orally once a day   acetaminophen 325 mg oral tablet: 2 tab(s) orally every 6 hours, As needed, Mild Pain (1 - 3)  aspirin 81 mg oral tablet: 1  orally once a day  cilostazol 100 mg oral tablet: 1 tab(s) orally 2 times a day  clotrimazole 1% topical cream: 1 application topically 2 times a day  Coumadin 4 mg oral tablet: 1 tab(s) orally once a day (at bedtime)  escitalopram 5 mg oral tablet: 1 tab(s) orally once a day  ferrous sulfate 325 mg (65 mg elemental iron) oral tablet: 1 tab(s) orally once a day  folic acid 1 mg oral tablet: 1 tab(s) orally once a day  hydrocortisone 1% topical cream: 1 application topically once a day  metoprolol succinate 100 mg oral tablet, extended release: 1 tab(s) orally once a day  nystatin 100,000 units/g topical cream: 1 application topically 2 times a day  spironolactone 25 mg oral tablet: 1 tab(s) orally every 12 hours  Synthroid 137 mcg (0.137 mg) oral tablet: 1 tab(s) orally once a day  torsemide 20 mg oral tablet: 1 tab(s) orally once a day  Vitamin C 500 mg oral tablet: 1 tab(s) orally once a day  Vitamin D3 2000 intl units (50 mcg) oral tablet: 1  orally once a day  Vytorin 10 mg-40 mg oral tablet: 1 tab(s) orally once a day

## 2021-08-25 DIAGNOSIS — I34.0 NONRHEUMATIC MITRAL (VALVE) INSUFFICIENCY: ICD-10-CM

## 2021-08-25 DIAGNOSIS — E03.9 HYPOTHYROIDISM, UNSPECIFIED: ICD-10-CM

## 2021-08-25 DIAGNOSIS — Z95.0 PRESENCE OF CARDIAC PACEMAKER: ICD-10-CM

## 2021-08-25 DIAGNOSIS — S91.309A UNSPECIFIED OPEN WOUND, UNSPECIFIED FOOT, INITIAL ENCOUNTER: ICD-10-CM

## 2021-08-25 DIAGNOSIS — I25.10 ATHEROSCLEROTIC HEART DISEASE OF NATIVE CORONARY ARTERY WITHOUT ANGINA PECTORIS: ICD-10-CM

## 2021-08-25 LAB
ANION GAP SERPL CALC-SCNC: 12 MMOL/L — SIGNIFICANT CHANGE UP (ref 5–17)
APPEARANCE UR: CLEAR — SIGNIFICANT CHANGE UP
BILIRUB UR-MCNC: NEGATIVE — SIGNIFICANT CHANGE UP
BLD GP AB SCN SERPL QL: NEGATIVE — SIGNIFICANT CHANGE UP
BUN SERPL-MCNC: 40 MG/DL — HIGH (ref 7–23)
CALCIUM SERPL-MCNC: 9.9 MG/DL — SIGNIFICANT CHANGE UP (ref 8.4–10.5)
CHLORIDE SERPL-SCNC: 101 MMOL/L — SIGNIFICANT CHANGE UP (ref 96–108)
CO2 SERPL-SCNC: 23 MMOL/L — SIGNIFICANT CHANGE UP (ref 22–31)
COLOR SPEC: YELLOW — SIGNIFICANT CHANGE UP
CREAT SERPL-MCNC: 1.32 MG/DL — HIGH (ref 0.5–1.3)
DIFF PNL FLD: NEGATIVE — SIGNIFICANT CHANGE UP
FIBRINOGEN PPP-MCNC: 497 MG/DL — SIGNIFICANT CHANGE UP (ref 290–520)
GLUCOSE SERPL-MCNC: 108 MG/DL — HIGH (ref 70–99)
GLUCOSE UR QL: NEGATIVE — SIGNIFICANT CHANGE UP
HCT VFR BLD CALC: 34 % — LOW (ref 39–50)
HGB BLD-MCNC: 10.8 G/DL — LOW (ref 13–17)
KETONES UR-MCNC: NEGATIVE — SIGNIFICANT CHANGE UP
LEUKOCYTE ESTERASE UR-ACNC: NEGATIVE — SIGNIFICANT CHANGE UP
MCHC RBC-ENTMCNC: 29.8 PG — SIGNIFICANT CHANGE UP (ref 27–34)
MCHC RBC-ENTMCNC: 31.8 GM/DL — LOW (ref 32–36)
MCV RBC AUTO: 93.9 FL — SIGNIFICANT CHANGE UP (ref 80–100)
NITRITE UR-MCNC: NEGATIVE — SIGNIFICANT CHANGE UP
NRBC # BLD: 0 /100 WBCS — SIGNIFICANT CHANGE UP (ref 0–0)
PH UR: 6 — SIGNIFICANT CHANGE UP (ref 5–8)
PLATELET # BLD AUTO: 160 K/UL — SIGNIFICANT CHANGE UP (ref 150–400)
POTASSIUM SERPL-MCNC: 4.3 MMOL/L — SIGNIFICANT CHANGE UP (ref 3.5–5.3)
POTASSIUM SERPL-SCNC: 4.3 MMOL/L — SIGNIFICANT CHANGE UP (ref 3.5–5.3)
PROT UR-MCNC: ABNORMAL
RBC # BLD: 3.62 M/UL — LOW (ref 4.2–5.8)
RBC # FLD: 14.2 % — SIGNIFICANT CHANGE UP (ref 10.3–14.5)
RH IG SCN BLD-IMP: NEGATIVE — SIGNIFICANT CHANGE UP
SODIUM SERPL-SCNC: 136 MMOL/L — SIGNIFICANT CHANGE UP (ref 135–145)
SP GR SPEC: 1.02 — SIGNIFICANT CHANGE UP (ref 1.01–1.02)
T3 SERPL-MCNC: 76 NG/DL — LOW (ref 80–200)
T4 AB SER-ACNC: 8.3 UG/DL — SIGNIFICANT CHANGE UP (ref 4.6–12)
TSH SERPL-MCNC: 5.02 UIU/ML — HIGH (ref 0.27–4.2)
UROBILINOGEN FLD QL: NEGATIVE — SIGNIFICANT CHANGE UP
WBC # BLD: 5.3 K/UL — SIGNIFICANT CHANGE UP (ref 3.8–10.5)
WBC # FLD AUTO: 5.3 K/UL — SIGNIFICANT CHANGE UP (ref 3.8–10.5)

## 2021-08-25 PROCEDURE — 93880 EXTRACRANIAL BILAT STUDY: CPT | Mod: 26

## 2021-08-25 PROCEDURE — 99222 1ST HOSP IP/OBS MODERATE 55: CPT

## 2021-08-25 RX ORDER — ATORVASTATIN CALCIUM 80 MG/1
40 TABLET, FILM COATED ORAL AT BEDTIME
Refills: 0 | Status: DISCONTINUED | OUTPATIENT
Start: 2021-08-25 | End: 2021-08-30

## 2021-08-25 RX ADMIN — Medication 1 APPLICATION(S): at 05:27

## 2021-08-25 RX ADMIN — NYSTATIN CREAM 1 APPLICATION(S): 100000 CREAM TOPICAL at 12:12

## 2021-08-25 RX ADMIN — Medication 20 MILLIEQUIVALENT(S): at 13:05

## 2021-08-25 RX ADMIN — ATORVASTATIN CALCIUM 40 MILLIGRAM(S): 80 TABLET, FILM COATED ORAL at 22:10

## 2021-08-25 RX ADMIN — Medication 1 APPLICATION(S): at 05:28

## 2021-08-25 RX ADMIN — LOSARTAN POTASSIUM 50 MILLIGRAM(S): 100 TABLET, FILM COATED ORAL at 05:23

## 2021-08-25 RX ADMIN — Medication 81 MILLIGRAM(S): at 13:05

## 2021-08-25 RX ADMIN — Medication 40 MILLIGRAM(S): at 05:23

## 2021-08-25 RX ADMIN — Medication 1 APPLICATION(S): at 18:03

## 2021-08-25 RX ADMIN — Medication 137 MICROGRAM(S): at 05:23

## 2021-08-25 RX ADMIN — Medication 50 MILLIGRAM(S): at 05:23

## 2021-08-25 RX ADMIN — HEPARIN SODIUM 5000 UNIT(S): 5000 INJECTION INTRAVENOUS; SUBCUTANEOUS at 18:03

## 2021-08-25 RX ADMIN — NYSTATIN CREAM 1 APPLICATION(S): 100000 CREAM TOPICAL at 22:10

## 2021-08-25 RX ADMIN — HEPARIN SODIUM 5000 UNIT(S): 5000 INJECTION INTRAVENOUS; SUBCUTANEOUS at 05:23

## 2021-08-25 RX ADMIN — CILOSTAZOL 100 MILLIGRAM(S): 100 TABLET ORAL at 05:23

## 2021-08-25 RX ADMIN — NYSTATIN CREAM 1 APPLICATION(S): 100000 CREAM TOPICAL at 05:28

## 2021-08-25 NOTE — DIETITIAN INITIAL EVALUATION ADULT. - OTHER INFO
Dosing wt: 230.6 lbs. Reports UBW of 175 lbs (2007) with gradual wt gain over last 14 years pt attributes to fluid losses and poor diet compliance to wt of 248 lbs. Denies any recent significant changes in wt PTA. Pt attributes wt loss during admission to fluid shifts as being diuresed. RD will continue to trend as new wts available/able.     Pt is eating fair with no changes in appetite 2/2 dislike of institutional foods. Denies recent N/V, diarrhea, or constipation. Last BM 8/23.    Extensive diet education provided, see chart note.

## 2021-08-25 NOTE — DIETITIAN INITIAL EVALUATION ADULT. - ORAL INTAKE PTA/DIET HISTORY
Pt was eating well with no changes in appetite. Pt was not following therapeutic diet. Pt with intake of high sodium convenience foods i.e. hot dogs, frozen meals, etc. Confirms no known food allergies. Denies Hx of chewing or swallowing issues. Denies micronutrient or oral nutrient supplement use.

## 2021-08-25 NOTE — PROGRESS NOTE ADULT - ASSESSMENT
76  year old male          h/o   , CAD s/p stent on ASA, A-fib/ PPM, , hypothyroid, and total left knee replacement       s/p rash across the upper torso/ back  for past week, pruritic on R arm, and rash in the groin      prior  PPM  interrogation  with  [facility]/   s/p  brief   bursts  of  afib/ , on prior visit         * admitted with sob,  from acute  diastolic  chf, related  to non compliance  with his  lasix    iv lasix   wound care/  lymphedema,  carla left  leg     *  CAD/ AFIb  /PPM,/ HTN     prior stent in 2007   on  on asa.,  toprol,  cozaar/ lipitor,  synthroid   *h/o  AFIB,    no  a/c       *Anemia, , hb stable, had  been seen by  gi  eval dr joann sanchez in past    right leg with distal redness.  not cellulitis / c/c  for past few  months  an d left leg lymphedema   /  xray spine. horse  shoe left kidney/ OA  of  spine  on    lasix,  ha  simproved    * Echo, normal  ef/ severe MR     ERENDIRA ,  severe  MR    cath, with 2 vessel disease   plan, CABG and  MVR on monday/  surg d r marni      f/p renee dick

## 2021-08-25 NOTE — CONSULT NOTE ADULT - ASSESSMENT
This is a 75 y/o male with PMHx of CAD s/p stent, HTN, asbestosis, Afib not on AC, varicose veins s/p stripping 1993 left leg, [employer] PPM 2012, hypothyroid, and total left knee replacement & fusion with latha in LLE who presents to ED with c/o low pain back x5 days associated with B/L LE swelling. Pt s/p 8/23 ERENDIRA which showed MV prolapse, severe MR and mild-moderate AR, with normal LV systolic function. Cardiac cath on 8/24 also demonstrating multivessel CAD. CT Surgery consulted for CABG/MVR evaluation.

## 2021-08-25 NOTE — CONSULT NOTE ADULT - PROBLEM SELECTOR RECOMMENDATION 3
Podiatry following  R foot 1st interspace chronic and persistent scaling skin; no open lesions; no wily signs of infection   -8/24 using aspetic technique, scaling skin of 1st interspace debrided down to dermis, but not beyond dermis; scaling skin collected and sent to pathology for PAS and micro for KOH   - recommend continue Ciclopirox cream application to right foot 1st interspace daily  - stable for discharge from podiatric standpoint, will f/u fungal data as outpatient  - pt to f/u w/ Dr. Hernandez within 1 week of discharge, call [phone] for appointment

## 2021-08-25 NOTE — PROGRESS NOTE ADULT - SUBJECTIVE AND OBJECTIVE BOX
CARDIOLOGY     PROGRESS  NOTE   ________________________________________________    CHIEF COMPLAINT:Patient is a 76y old  Male who presents with a chief complaint of sob (25 Aug 2021 08:10)  no complain.  	  REVIEW OF SYSTEMS:  CONSTITUTIONAL: No fever, weight loss, or fatigue  EYES: No eye pain, visual disturbances, or discharge  ENT:  No difficulty hearing, tinnitus, vertigo; No sinus or throat pain  NECK: No pain or stiffness  RESPIRATORY: No cough, wheezing, chills or hemoptysis; No Shortness of Breath  CARDIOVASCULAR: No chest pain, palpitations, passing out, dizziness, or leg swelling  GASTROINTESTINAL: No abdominal or epigastric pain. No nausea, vomiting, or hematemesis; No diarrhea or constipation. No melena or hematochezia.  GENITOURINARY: No dysuria, frequency, hematuria, or incontinence  NEUROLOGICAL: No headaches, memory loss, loss of strength, numbness, or tremors  SKIN: No itching, burning, rashes, or lesions   LYMPH Nodes: No enlarged glands  ENDOCRINE: No heat or cold intolerance; No hair loss  MUSCULOSKELETAL: No joint pain or swelling; No muscle, back, or extremity pain  PSYCHIATRIC: No depression, anxiety, mood swings, or difficulty sleeping  HEME/LYMPH: No easy bruising, or bleeding gums  ALLERGY AND IMMUNOLOGIC: No hives or eczema	    [ ] All others negative	  [ ] Unable to obtain    PHYSICAL EXAM:  T(C): 36.5 (08-25-21 @ 04:41), Max: 37.1 (08-25-21 @ 00:45)  HR: 76 (08-25-21 @ 04:41) (65 - 81)  BP: 133/64 (08-25-21 @ 04:41) (106/65 - 139/70)  RR: 18 (08-25-21 @ 04:41) (16 - 18)  SpO2: 95% (08-25-21 @ 04:41) (92% - 96%)  Wt(kg): --  I&O's Summary    24 Aug 2021 07:01  -  25 Aug 2021 07:00  --------------------------------------------------------  IN: 300 mL / OUT: 1925 mL / NET: -1625 mL        Appearance: Normal	  HEENT:   Normal oral mucosa, PERRL, EOMI	  Lymphatic: No lymphadenopathy  Cardiovascular: Normal S1 S2, No JVD, + murmurs, No edema  Respiratory: Lungs clear to auscultation	  Psychiatry: A & O x 3, Mood & affect appropriate  Gastrointestinal:  Soft, Non-tender, + BS	  Skin: No rashes, No ecchymoses, No cyanosis	  Neurologic: Non-focal  Extremities: Normal range of motion, No clubbing, cyanosis or edema  Vascular: Peripheral pulses palpable 2+ bilaterally    MEDICATIONS  (STANDING):  aspirin enteric coated 81 milliGRAM(s) Oral daily  cilostazol 100 milliGRAM(s) Oral two times a day  clotrimazole 1% Cream 1 Application(s) Topical two times a day  furosemide    Tablet 40 milliGRAM(s) Oral daily  heparin   Injectable 5000 Unit(s) SubCutaneous every 12 hours  hydrocortisone 1% Cream 1 Application(s) Topical two times a day  levothyroxine 137 MICROGram(s) Oral daily  losartan 50 milliGRAM(s) Oral daily  metoprolol succinate ER 50 milliGRAM(s) Oral daily  nystatin Powder 1 Application(s) Topical three times a day  potassium chloride    Tablet ER 20 milliEquivalent(s) Oral daily      TELEMETRY: 	    ECG:  	  RADIOLOGY:  OTHER: 	  	  LABS:	 	    CARDIAC MARKERS:                                10.8   5.30  )-----------( 160      ( 25 Aug 2021 07:19 )             34.0     08-25    136  |  101  |  40<H>  ----------------------------<  108<H>  4.3   |  23  |  1.32<H>    Ca    9.9      25 Aug 2021 07:19      proBNP: Serum Pro-Brain Natriuretic Peptide: 1881 pg/mL (08-18 @ 16:10)    Lipid Profile:   HgA1c:   TSH:         Assessment and plan  ---------------------------  75 yo  h/o  HT PPM, CAD.  LYMPHEDEMA. HTN  pw low back pain and SOB.      Patient states his low back pain began 5 days ago. He denies any trauma or falls. States the pain was across his entire lower back. Denies  leg   numbness, tingling, leg weakness, urinary or bowl incontinence.      He states the pain has improved but states he is still using his walker to walk instead of his cane.     He states of  the past 4 days he has also noticed DUNCAN. States he is only able to walk short distances with becoming dyspnea  and has  leg swelling.     He denies fever, chills, cough, chest pain, orthopnea, PND.      He states he did not take his lasix for the past few days because it makes him urinate too much.   pt is well known to me with hx of MR, htn, cad, a.fib on no ac ?sec to frequent falls, pvd on pletal, s/p ppm.  chf ?sec to non compliant to meds  decrease iv lasix  repeat echo to check MV  check ppm for any a.fib, if not Ac candidate will consider ?watchman  dvt prophylaxis  fu lytes  MR needs to be addressed, will consider ERENDIRA  continue with lasix,  increase losartan/ fu renal function  change lasix to po  ERENDIRA noted, severe MT not clip structure  R/L heart cath  with 2 vessele disease, LAD/LCX  needs CABG/MVR ct surgery called schedule on Tuesday  check carotid doppler

## 2021-08-25 NOTE — CONSULT NOTE ADULT - PROBLEM SELECTOR PROBLEM 2
Peripheral Block    Patient location during procedure: holding area  Start time: 1/24/2020 12:49 PM  Reason for block: post-op pain management  Staffing  Anesthesiologist: Kevin Canas DO  Performed: anesthesiologist   Preanesthetic Checklist  Completed: patient identified, site marked, surgical consent, pre-op evaluation, timeout performed, IV checked, risks and benefits discussed and monitors and equipment checked  Peripheral Block  Patient position: right lateral decubitus  Prep: ChloraPrep  Patient monitoring: heart rate, continuous pulse ox and frequent blood pressure checks  Block type: popliteal  Laterality: right  Injection technique: single-shot  Procedures: ultrasound guided, Ultrasound guidance required for the procedure to increase accuracy and safety of medication placement and decrease risk of complications   and nerve stimulator  Ultrasound permanent image saved  Needle  Needle type: Stimuplex   Needle gauge: 22 G  Needle length: 10 cm  Needle localization: ultrasound guidance and nerve stimulator  Assessment  Injection assessment: incremental injection, local visualized surrounding nerve on ultrasound, negative aspiration for heme and no paresthesia on injection  Paresthesia pain: none  Heart rate change: no  Slow fractionated injection: yes  Post-procedure:  site cleaned  patient tolerated the procedure well with no immediate complications
MR (mitral regurgitation)

## 2021-08-25 NOTE — CONSULT NOTE ADULT - SUBJECTIVE AND OBJECTIVE BOX
History of Present Illness:  HPI:  77 yo M    h/o  HTN   PPM, CAD.  LYMPHEDEMA. HTN      pw low back pain and SOB.      Patient states his low back pain began 5 days ago. He denies any trauma or falls.     States the pain was across his entire lower back. Denies  leg   numbness, tingling, leg weakness, urinary or bowl incontinence.      He states the pain has improved but states he is still using his walker to walk instead of his cane.     He states of  the past 4 days he has also noticed DUNCAN. States he is only able to walk short distances with becoming dyspnea  and has  leg swelling.     He denies fever, chills, cough, chest pain, orthopnea, PND.      He states he did not take his lasix for the past few days because it makes him urinate too much. (18 Aug 2021 19:37)       Past Medical History  Afib  not on anticoagulation    CAD (coronary artery disease)    Varicose vein of leg    Hypothyroid    Pacemaker    H/O fracture of hip      H/O asbestosis    HTN (hypertension)        Past Surgical History  History of total left knee replacement (TKR)    Pacemaker  Medtronic - Model RVDR01 1/10/2012    Stented coronary artery  drug eluding stent 2007    H/O detached retina repair      S/P cataract surgery    History of hip surgery  left hip ORIF    H/O varicose vein stripping   left leg    History of left knee replacement   - multiple infections post op requiring reoperation in , , )    H/O foot surgery  for infection of right foot 2019    H/O inguinal hernia repair  right 1993        MEDICATIONS  (STANDING):  aspirin enteric coated 81 milliGRAM(s) Oral daily  clotrimazole 1% Cream 1 Application(s) Topical two times a day  furosemide    Tablet 40 milliGRAM(s) Oral daily  heparin   Injectable 5000 Unit(s) SubCutaneous every 12 hours  hydrocortisone 1% Cream 1 Application(s) Topical two times a day  levothyroxine 137 MICROGram(s) Oral daily  losartan 50 milliGRAM(s) Oral daily  metoprolol succinate ER 50 milliGRAM(s) Oral daily  nystatin Powder 1 Application(s) Topical three times a day  potassium chloride    Tablet ER 20 milliEquivalent(s) Oral daily    MEDICATIONS  (PRN):  oxycodone    5 mG/acetaminophen 325 mG 1 Tablet(s) Oral every 6 hours PRN Moderate Pain (4 - 6)      Vital Signs Last 24 Hrs  T(C): 36.9 (08-25-21 @ 08:50), Max: 37.1 (21 @ 00:45)  T(F): 98.5 (21 @ 08:50), Max: 98.7 (21 @ 00:45)  HR: 79 (21 @ 08:50) (65 - 79)  BP: 100/61 (21 @ 08:50) (100/61 - 139/70)  RR: 18 (21 @ 08:50) (16 - 18)  SpO2: 95% (21 @ 08:50) (92% - 96%)           Daily     Daily Weight in k.9 (25 Aug 2021 08:50)  Admit Wt: Drug Dosing Weight  Height (cm): 170.2 (23 Aug 2021 12:28)  Weight (kg): 104.6 (23 Aug 2021 12:28)  BMI (kg/m2): 36.1 (23 Aug 2021 12:28)  BSA (m2): 2.15 (23 Aug 2021 12:28)    Allergies: alfuzosin (Hives)  levofloxacin (Hives)  Myrbetriq (Hives)  tamsulosin (Hives)      SOCIAL HISTORY:  Smoker: [ ] Yes  [X] No                 Pt denies  ETOH use: [ ] Yes  [X] No             Pt denies  Ilicit Drug use:  [ ] Yes  [X] No     Pt denies    FAMILY HISTORY:  FH: skin cancer (Father)        Review of Systems  GENERAL:  no weakness, fatigue, fevers or chills  NEURO: no dizziness, numbness, tingling or weakness  SKIN: no itching, burning, rashes, or lesions   HEENT: no visual changes;  no headache, no vertigo, no recent colds  RESPIRATORY: no shortness of breath, no cough, sputum, wheezing  CARDIOVASCULAR:  no chest pain,  or palpitations  GI: no abd pain. no N/V/D.  PERIPHERAL VASCULAR: no swelling, no tenderness, no erythema    PHYSICAL EXAM  General: Well nourished, well developed, NAD.                                              Neuro: Normal exam oriented to person/place & time with no focal motor or sensory  deficits.                    Eyes: Normal exam of conjunctiva & lids, pupils equally reactive.   ENT: Normal exam of nasal/oral mucosa with absence of cyanosis.   Neck: Normal exam of jugular veins, trachea & thyroid.   Chest: Normal lung exam with good air movement absence of wheezes, rales, or rhonchi.                                                                         CV:  Auscultation: normal S1S2, RRR   Carotids: No Bruits[X]  Abdominal Aorta: normal [X] nonpalpable[X]                                                                         GI: Normal exam of abdomen with no noted masses or tenderness. +BSx4Q                                                                                            Extremities: Normal no evidence of cyanosis or deformity, Edema: none  Lower Extremity Pulses: Right[+2DP] Left[+2DP] Varicosities[none]  SKIN : Normal exam to inspection & palpation.                                                           LABS:                        10.8   5.30  )-----------( 160      ( 25 Aug 2021 07:19 )             34.0     08-    136  |  101  |  40<H>  ----------------------------<  108<H>  4.3   |  23  |  1.32<H>    Ca    9.9      25 Aug 2021 07:19            Cardiac Cath:    TTE / ERENDIRA:   History of Present Illness:  HPI:  75 yo M    h/o  HTN   PPM, CAD.  LYMPHEDEMA. HTN      pw low back pain and SOB.      Patient states his low back pain began 5 days ago. He denies any trauma or falls.     States the pain was across his entire lower back. Denies  leg   numbness, tingling, leg weakness, urinary or bowl incontinence.      He states the pain has improved but states he is still using his walker to walk instead of his cane.     He states of  the past 4 days he has also noticed DUNCAN. States he is only able to walk short distances with becoming dyspnea  and has  leg swelling.     He denies fever, chills, cough, chest pain, orthopnea, PND.      He states he did not take his lasix for the past few days because it makes him urinate too much. (18 Aug 2021 19:37)       Past Medical History  Afib  not on anticoagulation    CAD (coronary artery disease)    Varicose vein of leg    Hypothyroid    Pacemaker    H/O fracture of hip      H/O asbestosis    HTN (hypertension)        Past Surgical History  History of total left knee replacement (TKR)    Pacemaker  Medtronic - Model RVDR01 1/10/2012    Stented coronary artery  drug eluding stent 2007    H/O detached retina repair      S/P cataract surgery    History of hip surgery  left hip ORIF    H/O varicose vein stripping   left leg    History of left knee replacement   - multiple infections post op requiring reoperation in , , )    H/O foot surgery  for infection of right foot 2019    H/O inguinal hernia repair  right 1993        MEDICATIONS  (STANDING):  aspirin enteric coated 81 milliGRAM(s) Oral daily  clotrimazole 1% Cream 1 Application(s) Topical two times a day  furosemide    Tablet 40 milliGRAM(s) Oral daily  heparin   Injectable 5000 Unit(s) SubCutaneous every 12 hours  hydrocortisone 1% Cream 1 Application(s) Topical two times a day  levothyroxine 137 MICROGram(s) Oral daily  losartan 50 milliGRAM(s) Oral daily  metoprolol succinate ER 50 milliGRAM(s) Oral daily  nystatin Powder 1 Application(s) Topical three times a day  potassium chloride    Tablet ER 20 milliEquivalent(s) Oral daily    MEDICATIONS  (PRN):  oxycodone    5 mG/acetaminophen 325 mG 1 Tablet(s) Oral every 6 hours PRN Moderate Pain (4 - 6)      Vital Signs Last 24 Hrs  T(C): 36.9 (08-25-21 @ 08:50), Max: 37.1 (21 @ 00:45)  T(F): 98.5 (21 @ 08:50), Max: 98.7 (21 @ 00:45)  HR: 79 (21 @ 08:50) (65 - 79)  BP: 100/61 (21 @ 08:50) (100/61 - 139/70)  RR: 18 (21 @ 08:50) (16 - 18)  SpO2: 95% (21 @ 08:50) (92% - 96%)           Daily     Daily Weight in k.9 (25 Aug 2021 08:50)  Admit Wt: Drug Dosing Weight  Height (cm): 170.2 (23 Aug 2021 12:28)  Weight (kg): 104.6 (23 Aug 2021 12:28)  BMI (kg/m2): 36.1 (23 Aug 2021 12:28)  BSA (m2): 2.15 (23 Aug 2021 12:28)    Allergies: alfuzosin (Hives)  levofloxacin (Hives)  Myrbetriq (Hives)  tamsulosin (Hives)      SOCIAL HISTORY:  Smoker: [ ] Yes  [X] No                 Pt denies  ETOH use: [ ] Yes  [X] No             Pt denies  Ilicit Drug use:  [ ] Yes  [X] No     Pt denies    FAMILY HISTORY:  FH: skin cancer (Father)        Review of Systems  GENERAL:  no weakness, fatigue, fevers or chills  NEURO: no dizziness, numbness, tingling or weakness  SKIN: no itching, burning, rashes, or lesions   HEENT: no visual changes;  no headache, no vertigo, no recent colds  RESPIRATORY: no shortness of breath, no cough, sputum, wheezing  CARDIOVASCULAR:  no chest pain,  or palpitations  GI: no abd pain. no N/V/D.  PERIPHERAL VASCULAR: no swelling, no tenderness, no erythema    PHYSICAL EXAM  General: Well nourished, well developed, NAD.                                              Neuro: Normal exam oriented to person/place & time with no focal motor or sensory  deficits.                    Eyes: Normal exam of conjunctiva & lids, pupils equally reactive.   ENT: Normal exam of nasal/oral mucosa with absence of cyanosis.   Neck: Normal exam of jugular veins, trachea & thyroid.   Chest: Normal lung exam with good air movement absence of wheezes, rales, or rhonchi.                                                                         CV:  Auscultation: normal S1S2, RRR   Carotids: No Bruits[X]  Abdominal Aorta: normal [X] nonpalpable[X]                                                                         GI: Normal exam of abdomen with no noted masses or tenderness. +BSx4Q                                                                                            Extremities: Normal no evidence of cyanosis or deformity, Edema: none  Lower Extremity Pulses: Right[+2DP] Left[+2DP] Varicosities[none]  SKIN : Normal exam to inspection & palpation.                                                           LABS:                        10.8   5.30  )-----------( 160      ( 25 Aug 2021 07:19 )             34.0     136  |  101  |  40<H>  ----------------------------<  108<H>  4.3   |  23  |  1.32<H>        < from: Cardiac Cath Lab (21 @ 17:17) >  VENTRICLES: No LV gram was performed; however, a recent echocardiogram  demonstrated an EF of 60 %.  CORONARY VESSELS: The coronary circulation is right dominant.  LM:   --  LM: Angiography showed minor luminal irregularities with no flow  limiting lesions.  LAD:   --  Mid LAD: There was a discrete 75 % stenosis.  CX:   --  Circumflex: The vessel was medium sized.  --  Mid circumflex: There was a discrete 80 % stenosis.  --  OM1: There was a discrete 80 % stenosis at the ostium of the vessel  segment.  RCA:   --  RCA: The vessel was large sized and moderately calcified.  --  RPDA: There was a discrete 70 % stenosis.      < from: Transesophageal Echocardiogram w/o TTE (21 @ 20:15) >  Observations:  Mitral Valve: Bileaflet mitral valve prolapse ( Posterior >  anterior) with  myxomatous leaflets . Severe eccentric  mainly anterior mitral regurgitation. Flow reversal is seen  in the pulmonary veins. Mean transmitral valve gradient  equals 2 mm Hg.  Aortic Valve/Aorta: Calcified trileaflet aortic valve with  normal opening. Mild-moderate aortic regurgitation.  Simple atheroma noted in aortic arch/descending aorta.  Left Atrium: No left atrial or left atrial appendage  thrombus.  Left Ventricle: Normal left ventricular systolic function.  Normal leftventricular internal dimensions and wall  thicknesses.  Right Heart: A device wire is noted in the right heart.  Normal right ventricular size and function. Normal  tricuspid valve. Mild tricuspid regurgitation. Normal  pulmonic valve. Mild pulmonic regurgitation.  Pericardium/Pleura: Normal pericardium with no pericardial  effusion.  Hemodynamic: Unable to estimate RVSP. Agitated saline  injection and color flow Doppler demonstrates no evidence  of a patent foramen ovale.     History of Present Illness:  This is a 75 yo M h/o  HTN, PPM, CAD.  LYMPHEDEMA. HTN pw low back pain and SOB.      Patient states his low back pain began 5 days ago. He denies any trauma or falls.     States the pain was across his entire lower back. Denies  leg   numbness, tingling, leg weakness, urinary or bowl incontinence.      He states the pain has improved but states he is still using his walker to walk instead of his cane.     He states of  the past 4 days he has also noticed DUNCAN. States he is only able to walk short distances with becoming dyspnea  and has  leg swelling.     He denies fever, chills, cough, chest pain, orthopnea, PND.      He states he did not take his lasix for the past few days because it makes him urinate too much. (18 Aug 2021 19:37)       Past Medical History  Afib  not on anticoagulation    CAD (coronary artery disease)    Varicose vein of leg    Hypothyroid    Pacemaker    H/O fracture of hip  2017    H/O asbestosis    HTN (hypertension)        Past Surgical History  History of total left knee replacement (TKR)    Pacemaker  Medtronic - Model RVDR01 1/10/2012    Stented coronary artery  drug eluding stent 5/2007    H/O detached retina repair  1952    S/P cataract surgery    History of hip surgery  left hip ORIF    H/O varicose vein stripping  1993 left leg    History of left knee replacement  2013 - multiple infections post op requiring reoperation in 2015, 2017, 2019)    H/O foot surgery  for infection of right foot 2019    H/O inguinal hernia repair  right 1993        MEDICATIONS  (STANDING):  aspirin enteric coated 81 milliGRAM(s) Oral daily  clotrimazole 1% Cream 1 Application(s) Topical two times a day  furosemide    Tablet 40 milliGRAM(s) Oral daily  heparin   Injectable 5000 Unit(s) SubCutaneous every 12 hours  hydrocortisone 1% Cream 1 Application(s) Topical two times a day  levothyroxine 137 MICROGram(s) Oral daily  losartan 50 milliGRAM(s) Oral daily  metoprolol succinate ER 50 milliGRAM(s) Oral daily  nystatin Powder 1 Application(s) Topical three times a day  potassium chloride    Tablet ER 20 milliEquivalent(s) Oral daily    MEDICATIONS  (PRN):  oxycodone    5 mG/acetaminophen 325 mG 1 Tablet(s) Oral every 6 hours PRN Moderate Pain (4 - 6)      Vital Signs Last 24 Hrs  T(C): 36.9 (08-25-21 @ 08:50), Max: 37.1 (08-25-21 @ 00:45)  T(F): 98.5 (08-25-21 @ 08:50), Max: 98.7 (08-25-21 @ 00:45)  HR: 79 (08-25-21 @ 08:50) (65 - 79)  BP: 100/61 (08-25-21 @ 08:50) (100/61 - 139/70)  RR: 18 (08-25-21 @ 08:50) (16 - 18)  SpO2: 95% (08-25-21 @ 08:50) (92% - 96%)             Height (cm): 170.2    Weight (kg): 104.6   BMI (kg/m2): 36.1    (23 Aug 2021 12:28)      Allergies: alfuzosin (Hives)  levofloxacin (Hives)  Myrbetriq (Hives)  tamsulosin (Hives)      SOCIAL HISTORY:  Lives alone, 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, uses walker & cane to ambulate  Smoker: [X] Yes  [ ] No                 Former smoker, quit 40 years ago, 0.5ppd x5-6 years  ETOH use: [ ] Yes  [X] No               1 drink x week  Ilicit Drug use:  [ ] Yes  [X] No        Pt denies      FAMILY HISTORY:  FH: skin cancer (Father)        Review of Systems  GENERAL:  no weakness, fatigue, fevers or chills  NEURO: no dizziness, numbness, tingling or weakness  SKIN: no itching, burning, rashes, or lesions   HEENT: no visual changes;  no headache, no vertigo, no recent colds  RESPIRATORY: no shortness of breath, no cough, sputum, wheezing  CARDIOVASCULAR:  no chest pain,  or palpitations  GI: no abd pain. no N/V/D.  PERIPHERAL VASCULAR: no swelling, no tenderness, no erythema    PHYSICAL EXAM  General: Well nourished, well developed, NAD.                                              Neuro: Normal exam oriented to person/place & time with no focal motor or sensory  deficits.                    Eyes: Normal exam of conjunctiva & lids, pupils equally reactive. +surgical repair on Left eye  ENT: Normal exam of nasal/oral mucosa with absence of cyanosis.   Neck: Normal exam of jugular veins, trachea & thyroid.   Chest: Normal lung exam with good air movement absence of wheezes, rales, or rhonchi.                                                                         CV:  Auscultation: normal S1S2, RRR   Carotids: No Bruits[X]  Abdominal Aorta: normal [X] nonpalpable[X]                                                                         GI: Normal exam of abdomen with no noted masses or tenderness. +BSx4Q                                                                                            Extremities: Normal no evidence of cyanosis, Edema: +1, +PAD, +LLE surgical fusion shorter than RLE  Lower Extremity Pulses: Right[+2DP] Left[+2DP] +Varicosities  SKIN : Normal exam to inspection & palpation. +Right femoral incision CDI RAQUEL, no bleeding, no hematoma.                                                            LABS:                        10.8   5.30  )-----------( 160      ( 25 Aug 2021 07:19 )             34.0     136  |  101  |  40<H>  ----------------------------<  108<H>  4.3   |  23  |  1.32<H>        < from: Cardiac Cath Lab (08.24.21 @ 17:17) >  VENTRICLES: No LV gram was performed; however, a recent echocardiogram  demonstrated an EF of 60 %.  CORONARY VESSELS: The coronary circulation is right dominant.  LM:   --  LM: Angiography showed minor luminal irregularities with no flow  limiting lesions.  LAD:   --  Mid LAD: There was a discrete 75 % stenosis.  CX:   --  Circumflex: The vessel was medium sized.  --  Mid circumflex: There was a discrete 80 % stenosis.  --  OM1: There was a discrete 80 % stenosis at the ostium of the vessel  segment.  RCA:   --  RCA: The vessel was large sized and moderately calcified.  --  RPDA: There was a discrete 70 % stenosis.      < from: Transesophageal Echocardiogram w/o TTE (08.23.21 @ 20:15) >  Observations:  Mitral Valve: Bileaflet mitral valve prolapse ( Posterior >  anterior) with  myxomatous leaflets . Severe eccentric  mainly anterior mitral regurgitation. Flow reversal is seen  in the pulmonary veins. Mean transmitral valve gradient  equals 2 mm Hg.  Aortic Valve/Aorta: Calcified trileaflet aortic valve with  normal opening. Mild-moderate aortic regurgitation.  Simple atheroma noted in aortic arch/descending aorta.  Left Atrium: No left atrial or left atrial appendage  thrombus.  Left Ventricle: Normal left ventricular systolic function.  Normal leftventricular internal dimensions and wall  thicknesses.  Right Heart: A device wire is noted in the right heart.  Normal right ventricular size and function. Normal  tricuspid valve. Mild tricuspid regurgitation. Normal  pulmonic valve. Mild pulmonic regurgitation.  Pericardium/Pleura: Normal pericardium with no pericardial  effusion.  Hemodynamic: Unable to estimate RVSP. Agitated saline  injection and color flow Doppler demonstrates no evidence  of a patent foramen ovale.    < from: Xray Chest 2 Views PA/Lat (08.18.21 @ 16:35) >  FINDINGS:    Left chest wall pacemaker in place with intact leads. Generator obscures part of left chest.  The cardiac silhouette is enlarged. Mediastinum and robert are unremarkable. Tortuous thoracic aorta.  Accentuation of right hilum and central right pulmonary vascular markings suggesting mild pulmonary vascular congestion. Lungs otherwise clear.  No pleural effusion or pneumothorax.  There is osteoarthritic degenerative change of the spine.

## 2021-08-25 NOTE — DIETITIAN INITIAL EVALUATION ADULT. - PERTINENT MEDS FT
MEDICATIONS  (STANDING):  aspirin enteric coated 81 milliGRAM(s) Oral daily  atorvastatin 40 milliGRAM(s) Oral at bedtime  clotrimazole 1% Cream 1 Application(s) Topical two times a day  furosemide    Tablet 40 milliGRAM(s) Oral daily  heparin   Injectable 5000 Unit(s) SubCutaneous every 12 hours  hydrocortisone 1% Cream 1 Application(s) Topical two times a day  levothyroxine 137 MICROGram(s) Oral daily  losartan 50 milliGRAM(s) Oral daily  metoprolol succinate ER 50 milliGRAM(s) Oral daily  nystatin Powder 1 Application(s) Topical three times a day  potassium chloride    Tablet ER 20 milliEquivalent(s) Oral daily

## 2021-08-25 NOTE — PROGRESS NOTE ADULT - SUBJECTIVE AND OBJECTIVE BOX
[facility]  [place] [address] [phone]      INTERVAL HPI/OVERNIGHT EVENTS:  pt seen and examined. no complaints  s/p ERENDIRA with severe MR  tolerating diet, no N/V/D  hgb stable     MEDICATIONS  (STANDING):  aspirin enteric coated 81 milliGRAM(s) Oral daily  cilostazol 100 milliGRAM(s) Oral two times a day  furosemide   Injectable 40 milliGRAM(s) IV Push daily  heparin   Injectable 5000 Unit(s) SubCutaneous every 12 hours  levothyroxine 137 MICROGram(s) Oral daily  losartan 50 milliGRAM(s) Oral daily  metoprolol succinate ER 50 milliGRAM(s) Oral daily  potassium chloride    Tablet ER 20 milliEquivalent(s) Oral daily    MEDICATIONS  (PRN):  oxycodone    5 mG/acetaminophen 325 mG 1 Tablet(s) Oral every 6 hours PRN Moderate Pain (4 - 6)      Allergies    alfuzosin (Hives)  levofloxacin (Hives)  Myrbetriq (Hives)  tamsulosin (Hives)    Intolerances        ROS:   General:  No wt loss, fevers, chills, night sweats, fatigue,   Eyes:  Good vision, no reported pain  ENT:  No sore throat, pain, runny nose, dysphagia  CV:  No pain, palpitations, hypo/hypertension  Resp:  No dyspnea, cough, tachypnea, wheezing  GI:  No pain, No nausea, No vomiting, No diarrhea, No constipation, No weight loss, No fever, No pruritis, No rectal bleeding, No tarry stools, No dysphagia,  :  No pain, bleeding, incontinence, nocturia  Muscle:  No pain, weakness  Neuro:  No weakness, tingling, memory problems  Psych:  No fatigue, insomnia, mood problems, depression  Endocrine:  No polyuria, polydipsia, cold/heat intolerance  Heme:  No petechiae, ecchymosis, easy bruisability  Skin:  No rash, tattoos, scars, edema      PHYSICAL EXAM:   Vital Signs:  Vital Signs Last 24 Hrs  T(C): 36.7 (21 Aug 2021 08:46), Max: 37.5 (20 Aug 2021 17:35)  T(F): 98.1 (21 Aug 2021 08:46), Max: 99.5 (20 Aug 2021 17:35)  HR: 78 (21 Aug 2021 08:46) (70 - 81)  BP: 118/56 (21 Aug 2021 08:46) (103/71 - 143/63)  BP(mean): --  RR: 18 (21 Aug 2021 08:46) (18 - 18)  SpO2: 93% (21 Aug 2021 08:46) (93% - 97%)  Daily     Daily     GENERAL:  Appears stated age,   HEENT:  NC/AT,    CHEST:  Full & symmetric excursion,   HEART:  Regular rhythm,  ABDOMEN:  Soft, non-tender, non-distended,  EXTEREMITIES:  no cyanosis  SKIN:  No rash  NEURO:  Alert,       LABS:                        10.2   4.85  )-----------( 157      ( 21 Aug 2021 07:24 )             31.3     08-21    139  |  103  |  26<H>  ----------------------------<  97  4.1   |  24  |  1.24    Ca    9.2      21 Aug 2021 07:24            RADIOLOGY & ADDITIONAL TESTS:

## 2021-08-25 NOTE — PROGRESS NOTE ADULT - SUBJECTIVE AND OBJECTIVE BOX
afebrile    REVIEW OF SYSTEMS:  GEN: no fever,    no chills  RESP: no SOB,   no cough  CVS: no chest pain,   no palpitations  GI: no abdominal pain,   no nausea,   no vomiting,   no constipation,   no diarrhea  : no dysuria,   no frequency  NEURO: no headache,   no dizziness  PSYCH: no depression,   not anxious  Derm : no rash    MEDICATIONS  (STANDING):  aspirin enteric coated 81 milliGRAM(s) Oral daily  cilostazol 100 milliGRAM(s) Oral two times a day  clotrimazole 1% Cream 1 Application(s) Topical two times a day  furosemide    Tablet 40 milliGRAM(s) Oral daily  heparin   Injectable 5000 Unit(s) SubCutaneous every 12 hours  hydrocortisone 1% Cream 1 Application(s) Topical two times a day  levothyroxine 137 MICROGram(s) Oral daily  losartan 50 milliGRAM(s) Oral daily  metoprolol succinate ER 50 milliGRAM(s) Oral daily  nystatin Powder 1 Application(s) Topical three times a day  potassium chloride    Tablet ER 20 milliEquivalent(s) Oral daily    MEDICATIONS  (PRN):  oxycodone    5 mG/acetaminophen 325 mG 1 Tablet(s) Oral every 6 hours PRN Moderate Pain (4 - 6)      Vital Signs Last 24 Hrs  T(C): 36.5 (25 Aug 2021 04:41), Max: 37.1 (25 Aug 2021 00:45)  T(F): 97.7 (25 Aug 2021 04:41), Max: 98.7 (25 Aug 2021 00:45)  HR: 76 (25 Aug 2021 04:41) (65 - 81)  BP: 133/64 (25 Aug 2021 04:41) (106/65 - 139/70)  BP(mean): --  RR: 18 (25 Aug 2021 04:41) (16 - 18)  SpO2: 95% (25 Aug 2021 04:41) (92% - 96%)  CAPILLARY BLOOD GLUCOSE        I&O's Summary    24 Aug 2021 07:01  -  25 Aug 2021 07:00  --------------------------------------------------------  IN: 300 mL / OUT: 1925 mL / NET: -1625 mL        PHYSICAL EXAM:  HEAD:  Atraumatic, Normocephalic  NECK: Supple, No   JVD  CHEST/LUNG:   no     rales,     no,    rhonchi  HEART: Regular rate and rhythm;         murmur  ABDOMEN: Soft, Nontender, ;   EXTREMITIES:  less      edema  NEUROLOGY:  alert    LABS:    08-24    138  |  101  |  31<H>  ----------------------------<  100<H>  4.2   |  25  |  1.20    Ca    9.5      24 Aug 2021 07:02                            Culture - Fungal, Tissue (collected 08-24-21 @ 15:22)  Source: .Tissue right foot tissue  Preliminary Report (08-25-21 @ 07:37):    Testing in progress        Consultant(s) Notes Reviewed:      Care Discussed with Consultants/Other Providers:

## 2021-08-25 NOTE — CONSULT NOTE ADULT - PROBLEM SELECTOR RECOMMENDATION 2
Continue cardiac surgery pre-op workup  Continue diuresis on lasix   Strict I & Os  Daily weight  Plan for MVR Monday 8/30 with Dr. Daniel

## 2021-08-25 NOTE — CONSULT NOTE ADULT - PROBLEM SELECTOR RECOMMENDATION 9
Continue cardiac surgery pre-op workup  Check PFTs, and lab work  Continue asa, beta-blocker and statin  Pt will need vein mapping pre-op  Plan for CABG Monday 8/30 with Dr. Daniel

## 2021-08-25 NOTE — DIETITIAN INITIAL EVALUATION ADULT. - PERTINENT LABORATORY DATA
08-25 Na 136 mmol/L Glu 108 mg/dL<H> K+ 4.3 mmol/L Cr  1.32 mg/dL<H> BUN 40 mg/dL<H> Hgb 10.8 g/dL<L> Hct 34.0 %<L>

## 2021-08-26 LAB
A1C WITH ESTIMATED AVERAGE GLUCOSE RESULT: 6 % — HIGH (ref 4–5.6)
ANION GAP SERPL CALC-SCNC: 12 MMOL/L — SIGNIFICANT CHANGE UP (ref 5–17)
BUN SERPL-MCNC: 44 MG/DL — HIGH (ref 7–23)
CALCIUM SERPL-MCNC: 9.5 MG/DL — SIGNIFICANT CHANGE UP (ref 8.4–10.5)
CHLORIDE SERPL-SCNC: 101 MMOL/L — SIGNIFICANT CHANGE UP (ref 96–108)
CO2 SERPL-SCNC: 23 MMOL/L — SIGNIFICANT CHANGE UP (ref 22–31)
CREAT SERPL-MCNC: 1.37 MG/DL — HIGH (ref 0.5–1.3)
ESTIMATED AVERAGE GLUCOSE: 126 MG/DL — HIGH (ref 68–114)
GLUCOSE SERPL-MCNC: 102 MG/DL — HIGH (ref 70–99)
MRSA PCR RESULT.: SIGNIFICANT CHANGE UP
POTASSIUM SERPL-MCNC: 4.1 MMOL/L — SIGNIFICANT CHANGE UP (ref 3.5–5.3)
POTASSIUM SERPL-SCNC: 4.1 MMOL/L — SIGNIFICANT CHANGE UP (ref 3.5–5.3)
S AUREUS DNA NOSE QL NAA+PROBE: DETECTED
SODIUM SERPL-SCNC: 136 MMOL/L — SIGNIFICANT CHANGE UP (ref 135–145)

## 2021-08-26 PROCEDURE — 93280 PM DEVICE PROGR EVAL DUAL: CPT | Mod: 26

## 2021-08-26 RX ORDER — CHLORHEXIDINE GLUCONATE 213 G/1000ML
1 SOLUTION TOPICAL DAILY
Refills: 0 | Status: DISCONTINUED | OUTPATIENT
Start: 2021-08-26 | End: 2021-08-30

## 2021-08-26 RX ADMIN — NYSTATIN CREAM 1 APPLICATION(S): 100000 CREAM TOPICAL at 21:43

## 2021-08-26 RX ADMIN — NYSTATIN CREAM 1 APPLICATION(S): 100000 CREAM TOPICAL at 05:10

## 2021-08-26 RX ADMIN — CHLORHEXIDINE GLUCONATE 1 APPLICATION(S): 213 SOLUTION TOPICAL at 12:07

## 2021-08-26 RX ADMIN — Medication 20 MILLIEQUIVALENT(S): at 12:05

## 2021-08-26 RX ADMIN — Medication 1 APPLICATION(S): at 17:10

## 2021-08-26 RX ADMIN — ATORVASTATIN CALCIUM 40 MILLIGRAM(S): 80 TABLET, FILM COATED ORAL at 21:43

## 2021-08-26 RX ADMIN — Medication 1 APPLICATION(S): at 05:10

## 2021-08-26 RX ADMIN — Medication 137 MICROGRAM(S): at 05:11

## 2021-08-26 RX ADMIN — HEPARIN SODIUM 5000 UNIT(S): 5000 INJECTION INTRAVENOUS; SUBCUTANEOUS at 17:12

## 2021-08-26 RX ADMIN — HEPARIN SODIUM 5000 UNIT(S): 5000 INJECTION INTRAVENOUS; SUBCUTANEOUS at 05:10

## 2021-08-26 RX ADMIN — Medication 40 MILLIGRAM(S): at 05:11

## 2021-08-26 RX ADMIN — Medication 50 MILLIGRAM(S): at 05:10

## 2021-08-26 RX ADMIN — Medication 1 APPLICATION(S): at 05:11

## 2021-08-26 RX ADMIN — Medication 81 MILLIGRAM(S): at 12:05

## 2021-08-26 RX ADMIN — LOSARTAN POTASSIUM 50 MILLIGRAM(S): 100 TABLET, FILM COATED ORAL at 05:10

## 2021-08-26 RX ADMIN — NYSTATIN CREAM 1 APPLICATION(S): 100000 CREAM TOPICAL at 14:09

## 2021-08-26 NOTE — PROGRESS NOTE ADULT - SUBJECTIVE AND OBJECTIVE BOX
CARDIOLOGY     PROGRESS  NOTE   ________________________________________________    CHIEF COMPLAINT:Patient is a 76y old  Male who presents with a chief complaint of sob (25 Aug 2021 16:06)  no complain.  	  REVIEW OF SYSTEMS:  CONSTITUTIONAL: No fever, weight loss, or fatigue  EYES: No eye pain, visual disturbances, or discharge  ENT:  No difficulty hearing, tinnitus, vertigo; No sinus or throat pain  NECK: No pain or stiffness  RESPIRATORY: No cough, wheezing, chills or hemoptysis; No Shortness of Breath  CARDIOVASCULAR: No chest pain, palpitations, passing out, dizziness, or leg swelling  GASTROINTESTINAL: No abdominal or epigastric pain. No nausea, vomiting, or hematemesis; No diarrhea or constipation. No melena or hematochezia.  GENITOURINARY: No dysuria, frequency, hematuria, or incontinence  NEUROLOGICAL: No headaches, memory loss, loss of strength, numbness, or tremors  SKIN: No itching, burning, rashes, or lesions   LYMPH Nodes: No enlarged glands  ENDOCRINE: No heat or cold intolerance; No hair loss  MUSCULOSKELETAL: No joint pain or swelling; No muscle, back, or extremity pain  PSYCHIATRIC: No depression, anxiety, mood swings, or difficulty sleeping  HEME/LYMPH: No easy bruising, or bleeding gums  ALLERGY AND IMMUNOLOGIC: No hives or eczema	    [ ] All others negative	  [ ] Unable to obtain    PHYSICAL EXAM:  T(C): 36.5 (08-26-21 @ 08:06), Max: 36.9 (08-25-21 @ 08:50)  HR: 66 (08-26-21 @ 08:06) (66 - 79)  BP: 123/66 (08-26-21 @ 08:06) (100/61 - 129/56)  RR: 18 (08-26-21 @ 08:06) (18 - 18)  SpO2: 95% (08-26-21 @ 08:06) (92% - 97%)  Wt(kg): --  I&O's Summary    25 Aug 2021 07:01  -  26 Aug 2021 07:00  --------------------------------------------------------  IN: 0 mL / OUT: 1000 mL / NET: -1000 mL        Appearance: Normal	  HEENT:   Normal oral mucosa, PERRL, EOMI	  Lymphatic: No lymphadenopathy  Cardiovascular: Normal S1 S2, No JVD, + murmurs, No edema  Respiratory: Lungs clear to auscultation	  Psychiatry: A & O x 3, Mood & affect appropriate  Gastrointestinal:  Soft, Non-tender, + BS	  Skin: No rashes, No ecchymoses, No cyanosis	  Neurologic: Non-focal  Extremities: Normal range of motion, No clubbing, cyanosis or edema  Vascular: Peripheral pulses palpable 2+ bilaterally    MEDICATIONS  (STANDING):  aspirin enteric coated 81 milliGRAM(s) Oral daily  atorvastatin 40 milliGRAM(s) Oral at bedtime  chlorhexidine 2% Cloths 1 Application(s) Topical daily  clotrimazole 1% Cream 1 Application(s) Topical two times a day  furosemide    Tablet 40 milliGRAM(s) Oral daily  heparin   Injectable 5000 Unit(s) SubCutaneous every 12 hours  levothyroxine 137 MICROGram(s) Oral daily  losartan 50 milliGRAM(s) Oral daily  metoprolol succinate ER 50 milliGRAM(s) Oral daily  nystatin Powder 1 Application(s) Topical three times a day  potassium chloride    Tablet ER 20 milliEquivalent(s) Oral daily      TELEMETRY: 	    ECG:  	  RADIOLOGY:  OTHER: 	  	  LABS:	 	    CARDIAC MARKERS:                                10.8   5.30  )-----------( 160      ( 25 Aug 2021 07:19 )             34.0     08-26    136  |  101  |  44<H>  ----------------------------<  102<H>  4.1   |  23  |  1.37<H>    Ca    9.5      26 Aug 2021 06:54      proBNP: Serum Pro-Brain Natriuretic Peptide: 1881 pg/mL (08-18 @ 16:10)    Lipid Profile:   HgA1c:   TSH: Thyroid Stimulating Hormone, Serum: 5.02 uIU/mL (08-25 @ 22:25)    ·  Problem: Wound of foot.   ·  Recommendation: Podiatry following  R foot 1st interspace chronic and persistent scaling skin; no open lesions; no wily signs of infection   -8/24 using aspetic technique, scaling skin of 1st interspace debrided down to dermis, but not beyond dermis; scaling skin collected and sent to pathology for PAS and micro for KOH   - recommend continue Ciclopirox cream application to right foot 1st interspace daily  - stable for discharge from podiatric standpoint, will f/u fungal data as outpatient  - pt to f/u w/ Dr. Hernandez within 1 week of discharge, call [phone] for appointment.    Problem/Recommendation - 4:  ·  Problem: Pacemaker.   ·  Recommendation: [employer] PPM      Assessment and plan  ---------------------------  77 yo  h/o  HT PPM, CAD.  LYMPHEDEMA. HTN  pw low back pain and SOB.      Patient states his low back pain began 5 days ago. He denies any trauma or falls. States the pain was across his entire lower back. Denies  leg   numbness, tingling, leg weakness, urinary or bowl incontinence.      He states the pain has improved but states he is still using his walker to walk instead of his cane.     He states of  the past 4 days he has also noticed DUNCAN. States he is only able to walk short distances with becoming dyspnea  and has  leg swelling.     He denies fever, chills, cough, chest pain, orthopnea, PND.      He states he did not take his lasix for the past few days because it makes him urinate too much.   pt is well known to me with hx of MR, htn, cad, a.fib on no ac ?sec to frequent falls, pvd on pletal, s/p ppm.  chf ?sec to non compliant to meds  decrease iv lasix  repeat echo to check MV  check ppm for any a.fib, if not Ac candidate will consider ?watchman  dvt prophylaxis  fu lytes  MR needs to be addressed, will consider ERENDIRA  continue with lasix,  increase losartan/ fu renal function  change lasix to po  ERENDIRA noted, severe MT not clip structure  R/L heart cath  with 2 vessele disease, LAD/LCX  needs CABG/MVR ct surgery called schedule on Tuesday  check carotid doppler  hold losartan prior to surgery sec to increase creatnine  will call to interrogate the ppm today

## 2021-08-26 NOTE — PROGRESS NOTE ADULT - SUBJECTIVE AND OBJECTIVE BOX
afberile    REVIEW OF SYSTEMS:  GEN: no fever,    no chills  RESP: no SOB,   no cough  CVS: no chest pain,   no palpitations  GI: no abdominal pain,   no nausea,   no vomiting,   no constipation,   no diarrhea  : no dysuria,   no frequency  NEURO: no headache,   no dizziness  PSYCH: no depression,   not anxious  Derm : no rash    MEDICATIONS  (STANDING):  aspirin enteric coated 81 milliGRAM(s) Oral daily  atorvastatin 40 milliGRAM(s) Oral at bedtime  chlorhexidine 2% Cloths 1 Application(s) Topical daily  clotrimazole 1% Cream 1 Application(s) Topical two times a day  furosemide    Tablet 40 milliGRAM(s) Oral daily  heparin   Injectable 5000 Unit(s) SubCutaneous every 12 hours  levothyroxine 137 MICROGram(s) Oral daily  metoprolol succinate ER 50 milliGRAM(s) Oral daily  nystatin Powder 1 Application(s) Topical three times a day  potassium chloride    Tablet ER 20 milliEquivalent(s) Oral daily    MEDICATIONS  (PRN):      Vital Signs Last 24 Hrs  T(C): 36.5 (26 Aug 2021 08:06), Max: 36.9 (25 Aug 2021 08:50)  T(F): 97.7 (26 Aug 2021 08:06), Max: 98.5 (25 Aug 2021 08:50)  HR: 66 (26 Aug 2021 08:06) (66 - 79)  BP: 123/66 (26 Aug 2021 08:06) (100/61 - 129/56)  BP(mean): --  RR: 18 (26 Aug 2021 08:06) (18 - 18)  SpO2: 95% (26 Aug 2021 08:06) (92% - 97%)  CAPILLARY BLOOD GLUCOSE        I&O's Summary    25 Aug 2021 07:01  -  26 Aug 2021 07:00  --------------------------------------------------------  IN: 0 mL / OUT: 1000 mL / NET: -1000 mL        PHYSICAL EXAM:  HEAD:  Atraumatic, Normocephalic  NECK: Supple, No   JVD  CHEST/LUNG:   no     rales,     no,    rhonchi  HEART: Regular rate and rhythm;         murmur  ABDOMEN: Soft, Nontender, ;   EXTREMITIES:   less     edema  NEUROLOGY:  alert    LABS:                        10.8   5.30  )-----------( 160      ( 25 Aug 2021 07:19 )             34.0         136  |  101  |  44<H>  ----------------------------<  102<H>  4.1   |  23  |  1.37<H>    Ca    9.5      26 Aug 2021 06:54            Urinalysis Basic - ( 25 Aug 2021 18:48 )    Color: Yellow / Appearance: Clear / S.024 / pH: x  Gluc: x / Ketone: Negative  / Bili: Negative / Urobili: Negative   Blood: x / Protein: Trace / Nitrite: Negative   Leuk Esterase: Negative / RBC: 1 /hpf / WBC 1 /HPF   Sq Epi: x / Non Sq Epi: 1 /hpf / Bacteria: 0.0              Thyroid Stimulating Hormone, Serum: 5.02 uIU/mL ( @ 22:25)          Consultant(s) Notes Reviewed:      Care Discussed with Consultants/Other Providers:

## 2021-08-26 NOTE — PROGRESS NOTE ADULT - SUBJECTIVE AND OBJECTIVE BOX
[facility]  Than[place]la [place] [address] [phone]      INTERVAL HPI/OVERNIGHT EVENTS:  pt seen and examined. no complaints  s/p ERENDIRA with severe MR  tolerating diet, no N/V/D  hgb stable     MEDICATIONS  (STANDING):  aspirin enteric coated 81 milliGRAM(s) Oral daily  cilostazol 100 milliGRAM(s) Oral two times a day  furosemide   Injectable 40 milliGRAM(s) IV Push daily  heparin   Injectable 5000 Unit(s) SubCutaneous every 12 hours  levothyroxine 137 MICROGram(s) Oral daily  losartan 50 milliGRAM(s) Oral daily  metoprolol succinate ER 50 milliGRAM(s) Oral daily  potassium chloride    Tablet ER 20 milliEquivalent(s) Oral daily    MEDICATIONS  (PRN):  oxycodone    5 mG/acetaminophen 325 mG 1 Tablet(s) Oral every 6 hours PRN Moderate Pain (4 - 6)      Allergies    alfuzosin (Hives)  levofloxacin (Hives)  Myrbetriq (Hives)  tamsulosin (Hives)    Intolerances        ROS:   General:  No wt loss, fevers, chills, night sweats, fatigue,   Eyes:  Good vision, no reported pain  ENT:  No sore throat, pain, runny nose, dysphagia  CV:  No pain, palpitations, hypo/hypertension  Resp:  No dyspnea, cough, tachypnea, wheezing  GI:  No pain, No nausea, No vomiting, No diarrhea, No constipation, No weight loss, No fever, No pruritis, No rectal bleeding, No tarry stools, No dysphagia,  :  No pain, bleeding, incontinence, nocturia  Muscle:  No pain, weakness  Neuro:  No weakness, tingling, memory problems  Psych:  No fatigue, insomnia, mood problems, depression  Endocrine:  No polyuria, polydipsia, cold/heat intolerance  Heme:  No petechiae, ecchymosis, easy bruisability  Skin:  No rash, tattoos, scars, edema      PHYSICAL EXAM:   Vital Signs:  Vital Signs Last 24 Hrs  T(C): 36.7 (21 Aug 2021 08:46), Max: 37.5 (20 Aug 2021 17:35)  T(F): 98.1 (21 Aug 2021 08:46), Max: 99.5 (20 Aug 2021 17:35)  HR: 78 (21 Aug 2021 08:46) (70 - 81)  BP: 118/56 (21 Aug 2021 08:46) (103/71 - 143/63)  BP(mean): --  RR: 18 (21 Aug 2021 08:46) (18 - 18)  SpO2: 93% (21 Aug 2021 08:46) (93% - 97%)  Daily     Daily     GENERAL:  Appears stated age,   HEENT:  NC/AT,    CHEST:  Full & symmetric excursion,   HEART:  Regular rhythm,  ABDOMEN:  Soft, non-tender, non-distended,  EXTEREMITIES:  no cyanosis  SKIN:  No rash  NEURO:  Alert,       LABS:                        10.2   4.85  )-----------( 157      ( 21 Aug 2021 07:24 )             31.3     08-21    139  |  103  |  26<H>  ----------------------------<  97  4.1   |  24  |  1.24    Ca    9.2      21 Aug 2021 07:24            RADIOLOGY & ADDITIONAL TESTS:

## 2021-08-26 NOTE — PROGRESS NOTE ADULT - ASSESSMENT
76  year old male          h/o   , CAD s/p stent on ASA, A-fib/ PPM, , hypothyroid, and total left knee replacement       s/p rash across the upper torso/ back  for past week, pruritic on R arm, and rash in the groin      prior  PPM  interrogation  with  [facility]/   s/p  brief   bursts  of  afib/ , on prior visit         * admitted with sob,  from acute  diastolic  chf, related  to non compliance  with his  lasix   wound care/  lymphedema,  carla left  leg     *  CAD/ AFIb  /PPM,/ HTN     prior stent in 2007   on  on asa.,  toprol,  cozaar/ lipitor,  synthroid   *h/o  AFIB,    no  a/c       *Anemia, , hb stable, had  been seen by  gi  eval dr joann sanchez in past    right leg with distal redness.  not cellulitis / c/c  for past few  months  an d left leg lymphedema   /  xray spine. horse  shoe left kidney/ OA  of  spine  on    lasix,  ha  simproved    * Echo, normal  ef/ severe MR     ERENDIRA ,  severe  MR    cath, with 2 vessel disease   plan, CABG and  MVR on monday/  surg cordelia grider  seen by CT surgery   pedal edema ha s lessened      f/p wih  dr mikayla dick

## 2021-08-26 NOTE — PROGRESS NOTE ADULT - ASSESSMENT
anemia  afib  cad    no overt gi bleed  denies melena/hematochezia  hgb stable  cont regular diet  had egd/colon 12/2020 (cherdelmy yang)  no objection to continue asa/pletal  s/p ERENDIRA, severe MR  plan for CABG/ MVR Monday   cardio following   will follow   dw patient

## 2021-08-27 LAB
ANION GAP SERPL CALC-SCNC: 13 MMOL/L — SIGNIFICANT CHANGE UP (ref 5–17)
BUN SERPL-MCNC: 48 MG/DL — HIGH (ref 7–23)
CALCIUM SERPL-MCNC: 9.6 MG/DL — SIGNIFICANT CHANGE UP (ref 8.4–10.5)
CHLORIDE SERPL-SCNC: 104 MMOL/L — SIGNIFICANT CHANGE UP (ref 96–108)
CO2 SERPL-SCNC: 21 MMOL/L — LOW (ref 22–31)
CREAT SERPL-MCNC: 1.28 MG/DL — SIGNIFICANT CHANGE UP (ref 0.5–1.3)
GLUCOSE SERPL-MCNC: 96 MG/DL — SIGNIFICANT CHANGE UP (ref 70–99)
HCT VFR BLD CALC: 35.4 % — LOW (ref 39–50)
HGB BLD-MCNC: 11.5 G/DL — LOW (ref 13–17)
MCHC RBC-ENTMCNC: 30.2 PG — SIGNIFICANT CHANGE UP (ref 27–34)
MCHC RBC-ENTMCNC: 32.5 GM/DL — SIGNIFICANT CHANGE UP (ref 32–36)
MCV RBC AUTO: 92.9 FL — SIGNIFICANT CHANGE UP (ref 80–100)
NRBC # BLD: 0 /100 WBCS — SIGNIFICANT CHANGE UP (ref 0–0)
PLATELET # BLD AUTO: 139 K/UL — LOW (ref 150–400)
POTASSIUM SERPL-MCNC: 4.2 MMOL/L — SIGNIFICANT CHANGE UP (ref 3.5–5.3)
POTASSIUM SERPL-SCNC: 4.2 MMOL/L — SIGNIFICANT CHANGE UP (ref 3.5–5.3)
RBC # BLD: 3.81 M/UL — LOW (ref 4.2–5.8)
RBC # FLD: 14 % — SIGNIFICANT CHANGE UP (ref 10.3–14.5)
SODIUM SERPL-SCNC: 138 MMOL/L — SIGNIFICANT CHANGE UP (ref 135–145)
WBC # BLD: 4.83 K/UL — SIGNIFICANT CHANGE UP (ref 3.8–10.5)
WBC # FLD AUTO: 4.83 K/UL — SIGNIFICANT CHANGE UP (ref 3.8–10.5)

## 2021-08-27 RX ADMIN — CHLORHEXIDINE GLUCONATE 1 APPLICATION(S): 213 SOLUTION TOPICAL at 12:52

## 2021-08-27 RX ADMIN — Medication 40 MILLIGRAM(S): at 06:40

## 2021-08-27 RX ADMIN — NYSTATIN CREAM 1 APPLICATION(S): 100000 CREAM TOPICAL at 06:40

## 2021-08-27 RX ADMIN — ATORVASTATIN CALCIUM 40 MILLIGRAM(S): 80 TABLET, FILM COATED ORAL at 21:30

## 2021-08-27 RX ADMIN — NYSTATIN CREAM 1 APPLICATION(S): 100000 CREAM TOPICAL at 13:01

## 2021-08-27 RX ADMIN — NYSTATIN CREAM 1 APPLICATION(S): 100000 CREAM TOPICAL at 21:29

## 2021-08-27 RX ADMIN — Medication 1 APPLICATION(S): at 06:40

## 2021-08-27 RX ADMIN — Medication 81 MILLIGRAM(S): at 12:53

## 2021-08-27 RX ADMIN — Medication 1 APPLICATION(S): at 17:20

## 2021-08-27 RX ADMIN — Medication 50 MILLIGRAM(S): at 06:40

## 2021-08-27 RX ADMIN — HEPARIN SODIUM 5000 UNIT(S): 5000 INJECTION INTRAVENOUS; SUBCUTANEOUS at 17:20

## 2021-08-27 RX ADMIN — Medication 20 MILLIEQUIVALENT(S): at 12:53

## 2021-08-27 RX ADMIN — Medication 137 MICROGRAM(S): at 06:40

## 2021-08-27 RX ADMIN — HEPARIN SODIUM 5000 UNIT(S): 5000 INJECTION INTRAVENOUS; SUBCUTANEOUS at 06:39

## 2021-08-27 NOTE — PROGRESS NOTE ADULT - SUBJECTIVE AND OBJECTIVE BOX
afebrile    REVIEW OF SYSTEMS:  GEN: no fever,    no chills  RESP: no SOB,   no cough  CVS: no chest pain,   no palpitations  GI: no abdominal pain,   no nausea,   no vomiting,   no constipation,   no diarrhea  : no dysuria,   no frequency  NEURO: no headache,   no dizziness  PSYCH: no depression,   not anxious  Derm : no rash    MEDICATIONS  (STANDING):  aspirin enteric coated 81 milliGRAM(s) Oral daily  atorvastatin 40 milliGRAM(s) Oral at bedtime  chlorhexidine 2% Cloths 1 Application(s) Topical daily  clotrimazole 1% Cream 1 Application(s) Topical two times a day  furosemide    Tablet 40 milliGRAM(s) Oral daily  heparin   Injectable 5000 Unit(s) SubCutaneous every 12 hours  levothyroxine 137 MICROGram(s) Oral daily  metoprolol succinate ER 50 milliGRAM(s) Oral daily  nystatin Powder 1 Application(s) Topical three times a day  potassium chloride    Tablet ER 20 milliEquivalent(s) Oral daily    MEDICATIONS  (PRN):      Vital Signs Last 24 Hrs  T(C): 36.8 (27 Aug 2021 06:21), Max: 36.9 (26 Aug 2021 23:53)  T(F): 98.2 (27 Aug 2021 06:21), Max: 98.5 (26 Aug 2021 23:53)  HR: 54 (27 Aug 2021 06:21) (54 - 78)  BP: 143/62 (27 Aug 2021 06:21) (112/66 - 143/62)  BP(mean): --  RR: 16 (27 Aug 2021 06:21) (16 - 18)  SpO2: 94% (27 Aug 2021 06:21) (94% - 94%)  CAPILLARY BLOOD GLUCOSE        I&O's Summary    26 Aug 2021 07:01  -  27 Aug 2021 07:00  --------------------------------------------------------  IN: 0 mL / OUT: 2680 mL / NET: -2680 mL        PHYSICAL EXAM:  HEAD:  Atraumatic, Normocephalic  NECK: Supple, No   JVD  CHEST/LUNG:   no     rales,     no,    rhonchi  HEART: Regular rate and rhythm;         murmur  ABDOMEN: Soft, Nontender, ;   EXTREMITIES:   less     edema  NEUROLOGY:  alert    LABS:                        11.5   4.83  )-----------( 139      ( 27 Aug 2021 06:50 )             35.4         138  |  104  |  48<H>  ----------------------------<  96  4.2   |  21<L>  |  1.28    Ca    9.6      27 Aug 2021 06:49            Urinalysis Basic - ( 25 Aug 2021 18:48 )    Color: Yellow / Appearance: Clear / S.024 / pH: x  Gluc: x / Ketone: Negative  / Bili: Negative / Urobili: Negative   Blood: x / Protein: Trace / Nitrite: Negative   Leuk Esterase: Negative / RBC: 1 /hpf / WBC 1 /HPF   Sq Epi: x / Non Sq Epi: 1 /hpf / Bacteria: 0.0              Thyroid Stimulating Hormone, Serum: 5.02 uIU/mL ( @ 22:25)          Consultant(s) Notes Reviewed:      Care Discussed with Consultants/Other Providers:

## 2021-08-27 NOTE — PROGRESS NOTE ADULT - SUBJECTIVE AND OBJECTIVE BOX
[facility]  [place] [address] [phone]      INTERVAL HPI/OVERNIGHT EVENTS:  pt seen and examined. no complaints  s/p ERENDIRA with severe MR  tolerating diet, no N/V/D  hgb stable     MEDICATIONS  (STANDING):  aspirin enteric coated 81 milliGRAM(s) Oral daily  cilostazol 100 milliGRAM(s) Oral two times a day  furosemide   Injectable 40 milliGRAM(s) IV Push daily  heparin   Injectable 5000 Unit(s) SubCutaneous every 12 hours  levothyroxine 137 MICROGram(s) Oral daily  losartan 50 milliGRAM(s) Oral daily  metoprolol succinate ER 50 milliGRAM(s) Oral daily  potassium chloride    Tablet ER 20 milliEquivalent(s) Oral daily    MEDICATIONS  (PRN):  oxycodone    5 mG/acetaminophen 325 mG 1 Tablet(s) Oral every 6 hours PRN Moderate Pain (4 - 6)      Allergies    alfuzosin (Hives)  levofloxacin (Hives)  Myrbetriq (Hives)  tamsulosin (Hives)    Intolerances        ROS:   General:  No wt loss, fevers, chills, night sweats, fatigue,   Eyes:  Good vision, no reported pain  ENT:  No sore throat, pain, runny nose, dysphagia  CV:  No pain, palpitations, hypo/hypertension  Resp:  No dyspnea, cough, tachypnea, wheezing  GI:  No pain, No nausea, No vomiting, No diarrhea, No constipation, No weight loss, No fever, No pruritis, No rectal bleeding, No tarry stools, No dysphagia,  :  No pain, bleeding, incontinence, nocturia  Muscle:  No pain, weakness  Neuro:  No weakness, tingling, memory problems  Psych:  No fatigue, insomnia, mood problems, depression  Endocrine:  No polyuria, polydipsia, cold/heat intolerance  Heme:  No petechiae, ecchymosis, easy bruisability  Skin:  No rash, tattoos, scars, edema      PHYSICAL EXAM:   Vital Signs:  Vital Signs Last 24 Hrs  T(C): 36.7 (21 Aug 2021 08:46), Max: 37.5 (20 Aug 2021 17:35)  T(F): 98.1 (21 Aug 2021 08:46), Max: 99.5 (20 Aug 2021 17:35)  HR: 78 (21 Aug 2021 08:46) (70 - 81)  BP: 118/56 (21 Aug 2021 08:46) (103/71 - 143/63)  BP(mean): --  RR: 18 (21 Aug 2021 08:46) (18 - 18)  SpO2: 93% (21 Aug 2021 08:46) (93% - 97%)  Daily     Daily     GENERAL:  Appears stated age,   HEENT:  NC/AT,    CHEST:  Full & symmetric excursion,   HEART:  Regular rhythm,  ABDOMEN:  Soft, non-tender, non-distended,  EXTEREMITIES:  no cyanosis  SKIN:  No rash  NEURO:  Alert,       LABS:                        10.2   4.85  )-----------( 157      ( 21 Aug 2021 07:24 )             31.3     08-21    139  |  103  |  26<H>  ----------------------------<  97  4.1   |  24  |  1.24    Ca    9.2      21 Aug 2021 07:24            RADIOLOGY & ADDITIONAL TESTS:

## 2021-08-27 NOTE — PROGRESS NOTE ADULT - ASSESSMENT
76  year old male          h/o   , CAD s/p stent on ASA, A-fib/ PPM, , hypothyroid, and total left knee replacement       s/p rash across the upper torso/ back  for past week, pruritic on R arm, and rash in the groin      prior  PPM  interrogation  with  [facility]/   s/p  brief   bursts  of  afib/ , on prior visit         * admitted with sob,  from acute  diastolic  chf, related  to non compliance  with his  lasix   wound care/  lymphedema,  carla left  leg     *  CAD/ AFIb  /PPM,/ HTN     prior stent in 2007   on  on asa.,  toprol,  cozaar/ lipitor,  synthroid   *h/o  AFIB,    no  a/c       *Anemia, , hb stable, had  been seen by  gi  eval dr joann sanchez in past    right leg with distal redness.  not cellulitis / c/c  for past few  months  an d left leg lymphedema   /  xray spine. horse  shoe left kidney/ OA  of  spine  on    lasix,  ha  simproved    * Echo, normal  ef/ severe MR     ERENDIRA ,  severe  MR    cath, with 2 vessel disease   plan, CABG and  MVR on monday/  surg cordelia grider  seen by CT surgery   pedal edema ha s lessened   awiating cabg , next  week      f/p wih  dr mikayla dick

## 2021-08-27 NOTE — PROGRESS NOTE ADULT - ASSESSMENT
anemia  afib  cad    no overt gi bleed  denies melena/hematochezia  hgb stable  cont regular diet  had egd/colon 12/2020 (cherdelmy yang)  no objection to continue asa/pletal  s/p ERENDIRA, severe MR  plan for CABG/ MVR next week   cardio following   will follow   dw patient

## 2021-08-27 NOTE — PROGRESS NOTE ADULT - SUBJECTIVE AND OBJECTIVE BOX
CARDIOLOGY     PROGRESS  NOTE   ________________________________________________    CHIEF COMPLAINT:Patient is a 76y old  Male who presents with a chief complaint of sob (27 Aug 2021 08:09)  no complain.  	  REVIEW OF SYSTEMS:  CONSTITUTIONAL: No fever, weight loss, or fatigue  EYES: No eye pain, visual disturbances, or discharge  ENT:  No difficulty hearing, tinnitus, vertigo; No sinus or throat pain  NECK: No pain or stiffness  RESPIRATORY: No cough, wheezing, chills or hemoptysis; No Shortness of Breath  CARDIOVASCULAR: No chest pain, palpitations, passing out, dizziness, or leg swelling  GASTROINTESTINAL: No abdominal or epigastric pain. No nausea, vomiting, or hematemesis; No diarrhea or constipation. No melena or hematochezia.  GENITOURINARY: No dysuria, frequency, hematuria, or incontinence  NEUROLOGICAL: No headaches, memory loss, loss of strength, numbness, or tremors  SKIN: No itching, burning, rashes, or lesions   LYMPH Nodes: No enlarged glands  ENDOCRINE: No heat or cold intolerance; No hair loss  MUSCULOSKELETAL: No joint pain or swelling; No muscle, back, or extremity pain  PSYCHIATRIC: No depression, anxiety, mood swings, or difficulty sleeping  HEME/LYMPH: No easy bruising, or bleeding gums  ALLERGY AND IMMUNOLOGIC: No hives or eczema	    [ ] All others negative	  [ ] Unable to obtain    PHYSICAL EXAM:  T(C): 36.8 (08-27-21 @ 06:21), Max: 36.9 (08-26-21 @ 23:53)  HR: 54 (08-27-21 @ 06:21) (54 - 78)  BP: 143/62 (08-27-21 @ 06:21) (112/66 - 143/62)  RR: 16 (08-27-21 @ 06:21) (16 - 18)  SpO2: 94% (08-27-21 @ 06:21) (94% - 94%)  Wt(kg): --  I&O's Summary    26 Aug 2021 07:01  -  27 Aug 2021 07:00  --------------------------------------------------------  IN: 0 mL / OUT: 2680 mL / NET: -2680 mL        Appearance: Normal	  HEENT:   Normal oral mucosa, PERRL, EOMI	  Lymphatic: No lymphadenopathy  Cardiovascular: Normal S1 S2, No JVD, + murmurs, No edema  Respiratory: Lungs clear to auscultation	  Psychiatry: A & O x 3, Mood & affect appropriate  Gastrointestinal:  Soft, Non-tender, + BS	  Skin: No rashes, No ecchymoses, No cyanosis	  Neurologic: Non-focal  Extremities: Normal range of motion, No clubbing, cyanosis or edema  Vascular: Peripheral pulses palpable 2+ bilaterally    MEDICATIONS  (STANDING):  aspirin enteric coated 81 milliGRAM(s) Oral daily  atorvastatin 40 milliGRAM(s) Oral at bedtime  chlorhexidine 2% Cloths 1 Application(s) Topical daily  clotrimazole 1% Cream 1 Application(s) Topical two times a day  furosemide    Tablet 40 milliGRAM(s) Oral daily  heparin   Injectable 5000 Unit(s) SubCutaneous every 12 hours  levothyroxine 137 MICROGram(s) Oral daily  metoprolol succinate ER 50 milliGRAM(s) Oral daily  nystatin Powder 1 Application(s) Topical three times a day  potassium chloride    Tablet ER 20 milliEquivalent(s) Oral daily      TELEMETRY: 	    ECG:  	  RADIOLOGY:  OTHER: 	  	  LABS:	 	    CARDIAC MARKERS:                                11.5   4.83  )-----------( 139      ( 27 Aug 2021 06:50 )             35.4     08-27    138  |  104  |  48<H>  ----------------------------<  96  4.2   |  21<L>  |  1.28    Ca    9.6      27 Aug 2021 06:49      proBNP: Serum Pro-Brain Natriuretic Peptide: 1881 pg/mL (08-18 @ 16:10)    Lipid Profile:   HgA1c:   TSH: Thyroid Stimulating Hormone, Serum: 5.02 uIU/mL (08-25 @ 22:25)          Assessment and plan  ---------------------------  75 yo  h/o  HT PPM, CAD.  LYMPHEDEMA. HTN  pw low back pain and SOB.      Patient states his low back pain began 5 days ago. He denies any trauma or falls. States the pain was across his entire lower back. Denies  leg   numbness, tingling, leg weakness, urinary or bowl incontinence.      He states the pain has improved but states he is still using his walker to walk instead of his cane.     He states of  the past 4 days he has also noticed DUNCAN. States he is only able to walk short distances with becoming dyspnea  and has  leg swelling.     He denies fever, chills, cough, chest pain, orthopnea, PND.      He states he did not take his lasix for the past few days because it makes him urinate too much.   pt is well known to me with hx of MR, htn, cad, a.fib on no ac ?sec to frequent falls, pvd on pletal, s/p ppm.  chf ?sec to non compliant to meds  decrease iv lasix  repeat echo to check MV  check ppm for any a.fib, if not Ac candidate will consider ?watchman  dvt prophylaxis  fu lytes  MR needs to be addressed, will consider ERENDIRA  continue with lasix,  increase losartan/ fu renal function  change lasix to po  ERENDIRA noted, severe MT not clip structure  R/L heart cath  with 2 vessele disease, LAD/LCX  needs CABG/MVR ct surgery called schedule on Tuesday  check carotid doppler  hold losartan prior to surgery sec to increase creatnine  will call to interrogate the ppm today  renal function is improving.

## 2021-08-28 RX ORDER — MUPIROCIN 20 MG/G
1 OINTMENT TOPICAL EVERY 12 HOURS
Refills: 0 | Status: DISCONTINUED | OUTPATIENT
Start: 2021-08-28 | End: 2021-08-30

## 2021-08-28 RX ORDER — MUPIROCIN 20 MG/G
1 OINTMENT TOPICAL EVERY 12 HOURS
Refills: 0 | Status: DISCONTINUED | OUTPATIENT
Start: 2021-08-28 | End: 2021-08-28

## 2021-08-28 RX ORDER — MUPIROCIN 20 MG/G
1 OINTMENT TOPICAL
Refills: 0 | Status: DISCONTINUED | OUTPATIENT
Start: 2021-08-28 | End: 2021-08-30

## 2021-08-28 RX ADMIN — Medication 81 MILLIGRAM(S): at 12:00

## 2021-08-28 RX ADMIN — Medication 40 MILLIGRAM(S): at 05:49

## 2021-08-28 RX ADMIN — NYSTATIN CREAM 1 APPLICATION(S): 100000 CREAM TOPICAL at 14:42

## 2021-08-28 RX ADMIN — MUPIROCIN 1 APPLICATION(S): 20 OINTMENT TOPICAL at 18:04

## 2021-08-28 RX ADMIN — Medication 50 MILLIGRAM(S): at 05:49

## 2021-08-28 RX ADMIN — Medication 1 APPLICATION(S): at 18:03

## 2021-08-28 RX ADMIN — NYSTATIN CREAM 1 APPLICATION(S): 100000 CREAM TOPICAL at 05:48

## 2021-08-28 RX ADMIN — Medication 20 MILLIEQUIVALENT(S): at 12:00

## 2021-08-28 RX ADMIN — NYSTATIN CREAM 1 APPLICATION(S): 100000 CREAM TOPICAL at 23:19

## 2021-08-28 RX ADMIN — HEPARIN SODIUM 5000 UNIT(S): 5000 INJECTION INTRAVENOUS; SUBCUTANEOUS at 05:49

## 2021-08-28 RX ADMIN — Medication 1 APPLICATION(S): at 05:49

## 2021-08-28 RX ADMIN — Medication 137 MICROGRAM(S): at 05:49

## 2021-08-28 RX ADMIN — CHLORHEXIDINE GLUCONATE 1 APPLICATION(S): 213 SOLUTION TOPICAL at 12:01

## 2021-08-28 RX ADMIN — HEPARIN SODIUM 5000 UNIT(S): 5000 INJECTION INTRAVENOUS; SUBCUTANEOUS at 18:03

## 2021-08-28 RX ADMIN — ATORVASTATIN CALCIUM 40 MILLIGRAM(S): 80 TABLET, FILM COATED ORAL at 23:19

## 2021-08-28 NOTE — PROGRESS NOTE ADULT - SUBJECTIVE AND OBJECTIVE BOX
INTERVAL HPI/OVERNIGHT EVENTS:  HPI:  75 yo M    h/o  HTN   PPM, CAD.  LYMPHEDEMA. HTN      pw low back pain and SOB.      Patient states his low back pain began 5 days ago. He denies any trauma or falls.     States the pain was across his entire lower back. Denies  leg   numbness, tingling, leg weakness, urinary or bowl incontinence.      He states the pain has improved but states he is still using his walker to walk instead of his cane.     He states of  the past 4 days he has also noticed DUNCAN. States he is only able to walk short distances with becoming dyspnea  and has  leg swelling.     He denies fever, chills, cough, chest pain, orthopnea, PND.      He states he did not take his lasix for the past few days because it makes him urinate too much. (18 Aug 2021 19:37)    MEDICATIONS  (STANDING):  aspirin enteric coated 81 milliGRAM(s) Oral daily  atorvastatin 40 milliGRAM(s) Oral at bedtime  chlorhexidine 2% Cloths 1 Application(s) Topical daily  clotrimazole 1% Cream 1 Application(s) Topical two times a day  furosemide    Tablet 40 milliGRAM(s) Oral daily  heparin   Injectable 5000 Unit(s) SubCutaneous every 12 hours  levothyroxine 137 MICROGram(s) Oral daily  metoprolol succinate ER 50 milliGRAM(s) Oral daily  nystatin Powder 1 Application(s) Topical three times a day  potassium chloride    Tablet ER 20 milliEquivalent(s) Oral daily    MEDICATIONS  (PRN):      Allergies    alfuzosin (Hives)  levofloxacin (Hives)  Myrbetriq (Hives)  tamsulosin (Hives)    Intolerances          General:  No wt loss, fevers, chills, night sweats, fatigue,   Eyes:  Good vision, no reported pain  ENT:  No sore throat, pain, runny nose, dysphagia  CV:  No pain, palpitations, hypo/hypertension  Resp:  No dyspnea, cough, tachypnea, wheezing  GI:  No pain, No nausea, No vomiting, No diarrhea, No constipation, No weight loss, No fever, No pruritis, No rectal bleeding, No tarry stools, No dysphagia,  :  No pain, bleeding, incontinence, nocturia  Muscle:  No pain, weakness  Neuro:  No weakness, tingling, memory problems  Psych:  No fatigue, insomnia, mood problems, depression  Endocrine:  No polyuria, polydipsia, cold/heat intolerance  Heme:  No petechiae, ecchymosis, easy bruisability  Skin:  No rash, tattoos, scars, edema      PHYSICAL EXAM:   Vital Signs:  Vital Signs Last 24 Hrs  T(C): 36.6 (28 Aug 2021 08:30), Max: 36.8 (27 Aug 2021 21:36)  T(F): 97.9 (28 Aug 2021 08:30), Max: 98.2 (27 Aug 2021 21:36)  HR: 59 (28 Aug 2021 08:30) (58 - 76)  BP: 126/72 (28 Aug 2021 08:30) (120/73 - 147/68)  BP(mean): --  RR: 18 (28 Aug 2021 08:30) (18 - 18)  SpO2: 95% (28 Aug 2021 08:30) (94% - 95%)  Daily     Daily I&O's Summary    27 Aug 2021 07:01  -  28 Aug 2021 07:00  --------------------------------------------------------  IN: 240 mL / OUT: 450 mL / NET: -210 mL        GENERAL:  Appears stated age, well-groomed, well-nourished, no distress  HEENT:  NC/AT,  conjunctivae clear and pink, no thyromegaly, nodules, adenopathy, no JVD, sclera -anicteric  CHEST:  Full & symmetric excursion, no increased effort, breath sounds clear  HEART:  Regular rhythm, S1, S2, no murmur/rub/S3/S4, no abdominal bruit, no edema  ABDOMEN:  Soft, non-tender, non-distended, normoactive bowel sounds,  no masses ,no hepato-splenomegaly, no signs of chronic liver disease  EXTEREMITIES:  no cyanosis,clubbing or edema  SKIN:  No rash/erythema/ecchymoses/petechiae/wounds/abscess/warm/dry  NEURO:  Alert, oriented, no asterixis, no tremor, no encephalopathy      LABS:                        11.5   4.83  )-----------( 139      ( 27 Aug 2021 06:50 )             35.4     08-27    138  |  104  |  48<H>  ----------------------------<  96  4.2   |  21<L>  |  1.28    Ca    9.6      27 Aug 2021 06:49          amylase   lipase  RADIOLOGY & ADDITIONAL TESTS:   INTERVAL HPI/OVERNIGHT EVENTS:  No new overnight event.  No N/V/D.  Tolerating diet.        Allergies    alfuzosin (Hives)  levofloxacin (Hives)  Myrbetriq (Hives)  tamsulosin (Hives)    Intolerances          General:  No wt loss, fevers, chills, night sweats, fatigue,   Eyes:  Good vision, no reported pain  ENT:  No sore throat, pain, runny nose, dysphagia  CV:  No pain, palpitations, hypo/hypertension  Resp:  No dyspnea, cough, tachypnea, wheezing  GI:  No pain, No nausea, No vomiting, No diarrhea, No constipation, No weight loss, No fever, No pruritis, No rectal bleeding, No tarry stools, No dysphagia,  :  No pain, bleeding, incontinence, nocturia  Muscle:  No pain, weakness  Neuro:  No weakness, tingling, memory problems  Psych:  No fatigue, insomnia, mood problems, depression  Endocrine:  No polyuria, polydipsia, cold/heat intolerance  Heme:  No petechiae, ecchymosis, easy bruisability  Skin:  No rash, tattoos, scars, edema      PHYSICAL EXAM:   Vital Signs:  Vital Signs Last 24 Hrs  T(C): 36.6 (28 Aug 2021 08:30), Max: 36.8 (27 Aug 2021 21:36)  T(F): 97.9 (28 Aug 2021 08:30), Max: 98.2 (27 Aug 2021 21:36)  HR: 59 (28 Aug 2021 08:30) (58 - 76)  BP: 126/72 (28 Aug 2021 08:30) (120/73 - 147/68)  BP(mean): --  RR: 18 (28 Aug 2021 08:30) (18 - 18)  SpO2: 95% (28 Aug 2021 08:30) (94% - 95%)  Daily     Daily I&O's Summary    27 Aug 2021 07:01  -  28 Aug 2021 07:00  --------------------------------------------------------  IN: 240 mL / OUT: 450 mL / NET: -210 mL        GENERAL:  Appears stated age, well-groomed, well-nourished, no distress  HEENT:  NC/AT,  conjunctivae clear and pink, no thyromegaly, nodules, adenopathy, no JVD, sclera -anicteric  CHEST:  Full & symmetric excursion, no increased effort, breath sounds clear  HEART:  Regular rhythm, S1, S2, no murmur/rub/S3/S4, no abdominal bruit, no edema  ABDOMEN:  Soft, non-tender, non-distended, normoactive bowel sounds,  no masses ,no hepato-splenomegaly, no signs of chronic liver disease  EXTEREMITIES:  no cyanosis,clubbing or edema  SKIN:  No rash/erythema/ecchymoses/petechiae/wounds/abscess/warm/dry  NEURO:  Alert, oriented, no asterixis, no tremor, no encephalopathy      LABS:                        11.5   4.83  )-----------( 139      ( 27 Aug 2021 06:50 )             35.4     08-27    138  |  104  |  48<H>  ----------------------------<  96  4.2   |  21<L>  |  1.28    Ca    9.6      27 Aug 2021 06:49          amylase   lipase  RADIOLOGY & ADDITIONAL TESTS:

## 2021-08-28 NOTE — PROGRESS NOTE ADULT - ASSESSMENT
anemia  afib  cad    no overt gi bleed  denies melena/hematochezia  hgb stable  cont regular diet  had egd/colon 12/2020 (cher metha)  no objection to continue asa/pletal  s/p ERENDIRA, severe MR  plan for CABG/ MVR next week   cardio following   will follow   dw patient     Advanced care planning was discussed with patient and family.  Advanced care planning forms were reviewed and discussed.  Risks, benefits and alternatives of gastroenterologic procedures were discussed in detail and all questions were answered.    30 minutes spent.

## 2021-08-28 NOTE — PROGRESS NOTE ADULT - ASSESSMENT
76  year old male          h/o   , CAD s/p stent on ASA, A-fib/ PPM, , hypothyroid, and total left knee replacement       s/p rash across the upper torso/ back  for past week, pruritic on R arm, and rash in the groin      prior  PPM  interrogation  with  [facility]/   s/p  brief   bursts  of  afib/ , on prior visit         * admitted with sob,  from acute  diastolic  chf, related  to non compliance  with his  lasix   wound care/  lymphedema,  carla left  leg     *  CAD/ AFIb  /PPM,/ HTN     prior stent in 2007   on  on asa.,  toprol,  cozaar/ lipitor,  synthroid   *h/o  AFIB,    no  a/c       *Anemia, , hb stable, had  been seen by  gi  eval dr joann sanchez in past    right leg with distal redness.  not cellulitis / c/c  for past few  months  an d left leg lymphedema     xray spine. 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

## 2021-08-28 NOTE — PROGRESS NOTE ADULT - SUBJECTIVE AND OBJECTIVE BOX
afebrile  REVIEW OF SYSTEMS:  GEN: no fever,    no chills  RESP: no SOB,   no cough  CVS: no chest pain,   no palpitations  GI: no abdominal pain,   no nausea,   no vomiting,   no constipation,   no diarrhea  : no dysuria,   no frequency  NEURO: no headache,   no dizziness  PSYCH: no depression,   not anxious  Derm : no rash    MEDICATIONS  (STANDING):  aspirin enteric coated 81 milliGRAM(s) Oral daily  atorvastatin 40 milliGRAM(s) Oral at bedtime  chlorhexidine 2% Cloths 1 Application(s) Topical daily  clotrimazole 1% Cream 1 Application(s) Topical two times a day  furosemide    Tablet 40 milliGRAM(s) Oral daily  heparin   Injectable 5000 Unit(s) SubCutaneous every 12 hours  levothyroxine 137 MICROGram(s) Oral daily  metoprolol succinate ER 50 milliGRAM(s) Oral daily  nystatin Powder 1 Application(s) Topical three times a day  potassium chloride    Tablet ER 20 milliEquivalent(s) Oral daily    MEDICATIONS  (PRN):      Vital Signs Last 24 Hrs  T(C): 36.5 (28 Aug 2021 04:53), Max: 36.8 (27 Aug 2021 21:36)  T(F): 97.7 (28 Aug 2021 04:53), Max: 98.2 (27 Aug 2021 21:36)  HR: 61 (28 Aug 2021 05:46) (58 - 76)  BP: 147/68 (28 Aug 2021 05:46) (120/73 - 147/68)  BP(mean): --  RR: 18 (28 Aug 2021 04:53) (18 - 18)  SpO2: 95% (28 Aug 2021 04:53) (94% - 95%)  CAPILLARY BLOOD GLUCOSE        I&O's Summary    27 Aug 2021 07:01  -  28 Aug 2021 07:00  --------------------------------------------------------  IN: 240 mL / OUT: 450 mL / NET: -210 mL        PHYSICAL EXAM:  HEAD:  Atraumatic, Normocephalic  NECK: Supple, No   JVD  CHEST/LUNG:   no     rales,     no,    rhonchi  HEART: Regular rate and rhythm;         murmur  ABDOMEN: Soft, Nontender, ;   EXTREMITIES:    less    edema  NEUROLOGY:  alert    LABS:                        11.5   4.83  )-----------( 139      ( 27 Aug 2021 06:50 )             35.4     08-27    138  |  104  |  48<H>  ----------------------------<  96  4.2   |  21<L>  |  1.28    Ca    9.6      27 Aug 2021 06:49                      Thyroid Stimulating Hormone, Serum: 5.02 uIU/mL (08-25 @ 22:25)          Consultant(s) Notes Reviewed:      Care Discussed with Consultants/Other Providers:

## 2021-08-29 LAB
ANION GAP SERPL CALC-SCNC: 13 MMOL/L — SIGNIFICANT CHANGE UP (ref 5–17)
BLD GP AB SCN SERPL QL: NEGATIVE — SIGNIFICANT CHANGE UP
BUN SERPL-MCNC: 44 MG/DL — HIGH (ref 7–23)
CALCIUM SERPL-MCNC: 9.8 MG/DL — SIGNIFICANT CHANGE UP (ref 8.4–10.5)
CHLORIDE SERPL-SCNC: 105 MMOL/L — SIGNIFICANT CHANGE UP (ref 96–108)
CO2 SERPL-SCNC: 21 MMOL/L — LOW (ref 22–31)
CREAT SERPL-MCNC: 1.06 MG/DL — SIGNIFICANT CHANGE UP (ref 0.5–1.3)
GLUCOSE SERPL-MCNC: 113 MG/DL — HIGH (ref 70–99)
HCT VFR BLD CALC: 36.1 % — LOW (ref 39–50)
HGB BLD-MCNC: 11.5 G/DL — LOW (ref 13–17)
MCHC RBC-ENTMCNC: 29.3 PG — SIGNIFICANT CHANGE UP (ref 27–34)
MCHC RBC-ENTMCNC: 31.9 GM/DL — LOW (ref 32–36)
MCV RBC AUTO: 92.1 FL — SIGNIFICANT CHANGE UP (ref 80–100)
NRBC # BLD: 0 /100 WBCS — SIGNIFICANT CHANGE UP (ref 0–0)
PA ADP PRP-ACNC: 261 PRU — SIGNIFICANT CHANGE UP (ref 194–417)
PLATELET # BLD AUTO: 138 K/UL — LOW (ref 150–400)
POTASSIUM SERPL-MCNC: 4.5 MMOL/L — SIGNIFICANT CHANGE UP (ref 3.5–5.3)
POTASSIUM SERPL-SCNC: 4.5 MMOL/L — SIGNIFICANT CHANGE UP (ref 3.5–5.3)
RBC # BLD: 3.92 M/UL — LOW (ref 4.2–5.8)
RBC # FLD: 14.1 % — SIGNIFICANT CHANGE UP (ref 10.3–14.5)
RH IG SCN BLD-IMP: NEGATIVE — SIGNIFICANT CHANGE UP
SARS-COV-2 RNA SPEC QL NAA+PROBE: SIGNIFICANT CHANGE UP
SODIUM SERPL-SCNC: 139 MMOL/L — SIGNIFICANT CHANGE UP (ref 135–145)
WBC # BLD: 5.65 K/UL — SIGNIFICANT CHANGE UP (ref 3.8–10.5)
WBC # FLD AUTO: 5.65 K/UL — SIGNIFICANT CHANGE UP (ref 3.8–10.5)

## 2021-08-29 PROCEDURE — 99232 SBSQ HOSP IP/OBS MODERATE 35: CPT

## 2021-08-29 RX ORDER — CEFUROXIME AXETIL 250 MG
1500 TABLET ORAL ONCE
Refills: 0 | Status: DISCONTINUED | OUTPATIENT
Start: 2021-08-29 | End: 2021-08-30

## 2021-08-29 RX ORDER — CHLORHEXIDINE GLUCONATE 213 G/1000ML
1 SOLUTION TOPICAL ONCE
Refills: 0 | Status: COMPLETED | OUTPATIENT
Start: 2021-08-30 | End: 2021-08-30

## 2021-08-29 RX ORDER — CHLORHEXIDINE GLUCONATE 213 G/1000ML
30 SOLUTION TOPICAL ONCE
Refills: 0 | Status: COMPLETED | OUTPATIENT
Start: 2021-08-29 | End: 2021-08-30

## 2021-08-29 RX ADMIN — HEPARIN SODIUM 5000 UNIT(S): 5000 INJECTION INTRAVENOUS; SUBCUTANEOUS at 06:18

## 2021-08-29 RX ADMIN — Medication 137 MICROGRAM(S): at 06:17

## 2021-08-29 RX ADMIN — NYSTATIN CREAM 1 APPLICATION(S): 100000 CREAM TOPICAL at 06:36

## 2021-08-29 RX ADMIN — Medication 1 APPLICATION(S): at 06:36

## 2021-08-29 RX ADMIN — Medication 1 APPLICATION(S): at 17:18

## 2021-08-29 RX ADMIN — ATORVASTATIN CALCIUM 40 MILLIGRAM(S): 80 TABLET, FILM COATED ORAL at 21:45

## 2021-08-29 RX ADMIN — Medication 20 MILLIEQUIVALENT(S): at 11:08

## 2021-08-29 RX ADMIN — Medication 50 MILLIGRAM(S): at 06:17

## 2021-08-29 RX ADMIN — MUPIROCIN 1 APPLICATION(S): 20 OINTMENT TOPICAL at 17:17

## 2021-08-29 RX ADMIN — NYSTATIN CREAM 1 APPLICATION(S): 100000 CREAM TOPICAL at 13:15

## 2021-08-29 RX ADMIN — HEPARIN SODIUM 5000 UNIT(S): 5000 INJECTION INTRAVENOUS; SUBCUTANEOUS at 17:16

## 2021-08-29 RX ADMIN — Medication 81 MILLIGRAM(S): at 11:08

## 2021-08-29 RX ADMIN — CHLORHEXIDINE GLUCONATE 1 APPLICATION(S): 213 SOLUTION TOPICAL at 10:47

## 2021-08-29 RX ADMIN — MUPIROCIN 1 APPLICATION(S): 20 OINTMENT TOPICAL at 06:18

## 2021-08-29 RX ADMIN — NYSTATIN CREAM 1 APPLICATION(S): 100000 CREAM TOPICAL at 21:45

## 2021-08-29 RX ADMIN — Medication 40 MILLIGRAM(S): at 06:17

## 2021-08-29 NOTE — PROGRESS NOTE ADULT - SUBJECTIVE AND OBJECTIVE BOX
INTERVAL HPI/OVERNIGHT EVENTS:  No new overnight event.  No N/V/D.  Tolerating diet.    MEDICATIONS  (PRN):      Allergies    alfuzosin (Hives)  levofloxacin (Hives)  Myrbetriq (Hives)  tamsulosin (Hives)    Intolerances          General:  No wt loss, fevers, chills, night sweats, fatigue,   Eyes:  Good vision, no reported pain  ENT:  No sore throat, pain, runny nose, dysphagia  CV:  No pain, palpitations, hypo/hypertension  Resp:  No dyspnea, cough, tachypnea, wheezing  GI:  No pain, No nausea, No vomiting, No diarrhea, No constipation, No weight loss, No fever, No pruritis, No rectal bleeding, No tarry stools, No dysphagia,  :  No pain, bleeding, incontinence, nocturia  Muscle:  No pain, weakness  Neuro:  No weakness, tingling, memory problems  Psych:  No fatigue, insomnia, mood problems, depression  Endocrine:  No polyuria, polydipsia, cold/heat intolerance  Heme:  No petechiae, ecchymosis, easy bruisability  Skin:  No rash, tattoos, scars, edema      PHYSICAL EXAM:   Vital Signs:  Vital Signs Last 24 Hrs  T(C): 37.1 (29 Aug 2021 04:21), Max: 37.1 (29 Aug 2021 04:21)  T(F): 98.7 (29 Aug 2021 04:21), Max: 98.7 (29 Aug 2021 04:21)  HR: 55 (29 Aug 2021 04:21) (55 - 78)  BP: 116/61 (29 Aug 2021 04:21) (116/61 - 146/63)  BP(mean): --  RR: 18 (29 Aug 2021 04:21) (18 - 18)  SpO2: 93% (29 Aug 2021 04:21) (93% - 96%)  Daily     Daily Weight in k.2 (29 Aug 2021 07:30)I&O's Summary    28 Aug 2021 07:01  -  29 Aug 2021 07:00  --------------------------------------------------------  IN: 1110 mL / OUT: 2025 mL / NET: -915 mL    29 Aug 2021 07:01  -  29 Aug 2021 10:27  --------------------------------------------------------  IN: 240 mL / OUT: 800 mL / NET: -560 mL        GENERAL:  Appears stated age, well-groomed, well-nourished, no distress  HEENT:  NC/AT,  conjunctivae clear and pink, no thyromegaly, nodules, adenopathy, no JVD, sclera -anicteric  CHEST:  Full & symmetric excursion, no increased effort, breath sounds clear  HEART:  Regular rhythm, S1, S2, no murmur/rub/S3/S4, no abdominal bruit, no edema  ABDOMEN:  Soft, non-tender, non-distended, normoactive bowel sounds,  no masses ,no hepato-splenomegaly, no signs of chronic liver disease  EXTEREMITIES:  no cyanosis,clubbing or edema  SKIN:  No rash/erythema/ecchymoses/petechiae/wounds/abscess/warm/dry  NEURO:  Alert, oriented, no asterixis, no tremor, no encephalopathy      LABS:                        11.5   5.65  )-----------( 138      ( 29 Aug 2021 06:30 )             36.1     08    139  |  105  |  44<H>  ----------------------------<  113<H>  4.5   |  21<L>  |  1.06    Ca    9.8      29 Aug 2021 06:30          amylase   lipase  RADIOLOGY & ADDITIONAL TESTS:

## 2021-08-29 NOTE — PROGRESS NOTE ADULT - SUBJECTIVE AND OBJECTIVE BOX
CARDIOLOGY     PROGRESS  NOTE   ________________________________________________    CHIEF COMPLAINT:Patient is a 76y old  Male who presents with a chief complaint of sob (28 Aug 2021 09:40)  no complain.  	  REVIEW OF SYSTEMS:  CONSTITUTIONAL: No fever, weight loss, or fatigue  EYES: No eye pain, visual disturbances, or discharge  ENT:  No difficulty hearing, tinnitus, vertigo; No sinus or throat pain  NECK: No pain or stiffness  RESPIRATORY: No cough, wheezing, chills or hemoptysis; No Shortness of Breath  CARDIOVASCULAR: No chest pain, palpitations, passing out, dizziness, or leg swelling  GASTROINTESTINAL: No abdominal or epigastric pain. No nausea, vomiting, or hematemesis; No diarrhea or constipation. No melena or hematochezia.  GENITOURINARY: No dysuria, frequency, hematuria, or incontinence  NEUROLOGICAL: No headaches, memory loss, loss of strength, numbness, or tremors  SKIN: No itching, burning, rashes, or lesions   LYMPH Nodes: No enlarged glands  ENDOCRINE: No heat or cold intolerance; No hair loss  MUSCULOSKELETAL: No joint pain or swelling; No muscle, back, or extremity pain  PSYCHIATRIC: No depression, anxiety, mood swings, or difficulty sleeping  HEME/LYMPH: No easy bruising, or bleeding gums  ALLERGY AND IMMUNOLOGIC: No hives or eczema	    [ ] All others negative	  [ ] Unable to obtain    PHYSICAL EXAM:  T(C): 37.1 (08-29-21 @ 04:21), Max: 37.1 (08-29-21 @ 04:21)  HR: 55 (08-29-21 @ 04:21) (55 - 78)  BP: 116/61 (08-29-21 @ 04:21) (116/61 - 146/63)  RR: 18 (08-29-21 @ 04:21) (18 - 18)  SpO2: 93% (08-29-21 @ 04:21) (93% - 96%)  Wt(kg): --  I&O's Summary    28 Aug 2021 07:01  -  29 Aug 2021 07:00  --------------------------------------------------------  IN: 1110 mL / OUT: 2025 mL / NET: -915 mL        Appearance: Normal	  HEENT:   Normal oral mucosa, PERRL, EOMI	  Lymphatic: No lymphadenopathy  Cardiovascular: Normal S1 S2, No JVD, + murmurs, No edema  Respiratory: Lungs clear to auscultation	  Psychiatry: A & O x 3, Mood & affect appropriate  Gastrointestinal:  Soft, Non-tender, + BS	  Skin: No rashes, No ecchymoses, No cyanosis	  Neurologic: Non-focal  Extremities: Normal range of motion, No clubbing, cyanosis or edema  Vascular: Peripheral pulses palpable 2+ bilaterally    MEDICATIONS  (STANDING):  aspirin enteric coated 81 milliGRAM(s) Oral daily  atorvastatin 40 milliGRAM(s) Oral at bedtime  chlorhexidine 2% Cloths 1 Application(s) Topical daily  clotrimazole 1% Cream 1 Application(s) Topical two times a day  furosemide    Tablet 40 milliGRAM(s) Oral daily  heparin   Injectable 5000 Unit(s) SubCutaneous every 12 hours  levothyroxine 137 MICROGram(s) Oral daily  metoprolol succinate ER 50 milliGRAM(s) Oral daily  mupirocin 2% Nasal 1 Application(s) Nasal every 12 hours  mupirocin 2% Ointment 1 Application(s) Topical two times a day  nystatin Powder 1 Application(s) Topical three times a day  potassium chloride    Tablet ER 20 milliEquivalent(s) Oral daily      TELEMETRY: 	    ECG:  	  RADIOLOGY:  OTHER: 	  	  LABS:	 	    CARDIAC MARKERS:                                11.5   5.65  )-----------( 138      ( 29 Aug 2021 06:30 )             36.1     08-29    139  |  105  |  44<H>  ----------------------------<  113<H>  4.5   |  21<L>  |  1.06    Ca    9.8      29 Aug 2021 06:30      proBNP: Serum Pro-Brain Natriuretic Peptide: 1881 pg/mL (08-18 @ 16:10)    Lipid Profile:   HgA1c:   TSH: Thyroid Stimulating Hormone, Serum: 5.02 uIU/mL (08-25 @ 22:25)          Assessment and plan  ---------------------------  75 yo  h/o  HT PPM, CAD.  LYMPHEDEMA. HTN  pw low back pain and SOB.      Patient states his low back pain began 5 days ago. He denies any trauma or falls. States the pain was across his entire lower back. Denies  leg   numbness, tingling, leg weakness, urinary or bowl incontinence.      He states the pain has improved but states he is still using his walker to walk instead of his cane.     He states of  the past 4 days he has also noticed DUNCAN. States he is only able to walk short distances with becoming dyspnea  and has  leg swelling.     He denies fever, chills, cough, chest pain, orthopnea, PND.      He states he did not take his lasix for the past few days because it makes him urinate too much.   pt is well known to me with hx of MR, htn, cad, a.fib on no ac ?sec to frequent falls, pvd on pletal, s/p ppm.  chf ?sec to non compliant to meds  decrease iv lasix  repeat echo to check MV  check ppm for any a.fib, if not Ac candidate will consider ?watchman  dvt prophylaxis  fu lytes  MR needs to be addressed, will consider ERENDIRA  continue with lasix,  increase losartan/ fu renal function  change lasix to po  ERENDIRA noted, severe MT not clip structure  R/L heart cath  with 2 vessele disease, LAD/LCX  needs CABG/MVR ct surgery called schedule on Tuesday  check carotid doppler  hold losartan prior to surgery sec to increase creatnine  will call to interrogate the ppm today  renal function is improving, macrina noted  decrease lasix to 20 mg daily

## 2021-08-29 NOTE — PROGRESS NOTE ADULT - SUBJECTIVE AND OBJECTIVE BOX
afebrile  REVIEW OF SYSTEMS:  GEN: no fever,    no chills  RESP: no SOB,   no cough  CVS: no chest pain,   no palpitations  GI: no abdominal pain,   no nausea,   no vomiting,   no constipation,   no diarrhea  : no dysuria,   no frequency  NEURO: no headache,   no dizziness  PSYCH: no depression,   not anxious  Derm : no rash    MEDICATIONS  (STANDING):  aspirin enteric coated 81 milliGRAM(s) Oral daily  atorvastatin 40 milliGRAM(s) Oral at bedtime  chlorhexidine 2% Cloths 1 Application(s) Topical daily  clotrimazole 1% Cream 1 Application(s) Topical two times a day  furosemide    Tablet 40 milliGRAM(s) Oral daily  heparin   Injectable 5000 Unit(s) SubCutaneous every 12 hours  levothyroxine 137 MICROGram(s) Oral daily  metoprolol succinate ER 50 milliGRAM(s) Oral daily  mupirocin 2% Nasal 1 Application(s) Nasal every 12 hours  mupirocin 2% Ointment 1 Application(s) Topical two times a day  nystatin Powder 1 Application(s) Topical three times a day  potassium chloride    Tablet ER 20 milliEquivalent(s) Oral daily    MEDICATIONS  (PRN):      Vital Signs Last 24 Hrs  T(C): 37.1 (29 Aug 2021 04:21), Max: 37.1 (29 Aug 2021 04:21)  T(F): 98.7 (29 Aug 2021 04:21), Max: 98.7 (29 Aug 2021 04:21)  HR: 55 (29 Aug 2021 04:21) (55 - 78)  BP: 116/61 (29 Aug 2021 04:21) (116/61 - 146/63)  BP(mean): --  RR: 18 (29 Aug 2021 04:21) (18 - 18)  SpO2: 93% (29 Aug 2021 04:21) (93% - 96%)  CAPILLARY BLOOD GLUCOSE        I&O's Summary    28 Aug 2021 07:01  -  29 Aug 2021 07:00  --------------------------------------------------------  IN: 1110 mL / OUT: 2025 mL / NET: -915 mL        PHYSICAL EXAM:  HEAD:  Atraumatic, Normocephalic  NECK: Supple, No   JVD  CHEST/LUNG:   no     rales,     no,    rhonchi  HEART: Regular rate and rhythm;         murmur  ABDOMEN: Soft, Nontender, ;   EXTREMITIES:        edema  NEUROLOGY:  alert    LABS:                        11.5   5.65  )-----------( 138      ( 29 Aug 2021 06:30 )             36.1     08-29    139  |  105  |  44<H>  ----------------------------<  113<H>  4.5   |  21<L>  |  1.06    Ca    9.8      29 Aug 2021 06:30                      Thyroid Stimulating Hormone, Serum: 5.02 uIU/mL (08-25 @ 22:25)          Consultant(s) Notes Reviewed:      Care Discussed with Consultants/Other Providers:

## 2021-08-29 NOTE — PROGRESS NOTE ADULT - ASSESSMENT
76  year old male          h/o   , CAD s/p stent on ASA, A-fib/ PPM, , hypothyroid, and total left knee replacement       s/p rash across the upper torso/ back  for past week, pruritic on R arm, and rash in the groin      prior  PPM  interrogation  with  [facility]/   s/p  brief   bursts  of  afib/ , on prior visit         * admitted with sob,  from acute  diastolic  chf, related  to non compliance  with his  lasix   wound care/  lymphedema,  carla left  leg     *  CAD/ AFIb  /PPM,/ HTN     prior stent in 2007   on  on asa.,  toprol,  cozaar/ lipitor,  synthroid   *h/o  AFIB,    no  a/c       *Anemia, , hb stable, had  been seen by  gi  eval dr joann sanchez in past    right leg with distal redness.  not cellulitis / c/c  for past few  months  an d left leg lymphedema     xray spine. horse  shoe left kidney/ OA  of  spine  on    lasix,  ha  simproved    * Echo, normal  ef/ severe MR     ERENDIRA ,  severe  MR    cath, with 2 vessel disease   plan, CABG and  MVR on monday/  surg d r marni   pedal edema has lessened   awiating cabg   on 8/30,     ot  is  stable, on asa, lipitor, lasix,  toprol      f/p wi  dr mikayla dick

## 2021-08-29 NOTE — PROGRESS NOTE ADULT - ASSESSMENT
75 yo  h/o PPM, CAD.  LYMPHEDEMA. HTN, MR,  a.fib on no ac secondary to frequent falls, PVD on pletal presents with SOB. Cardiac workup demonstrates severe MR and multivessel disease.   77 yo  h/o PPM (medtronic model# RVDR01 serial# [id number]), CAD.  LYMPHEDEMA. HTN, MR,  a.fib on no ac secondary to frequent falls, PVD on pletal presents with SOB. Cardiac workup demonstrates severe MR and multivessel disease.   77 yo  h/o PPM , CAD.  LYMPHEDEMA. HTN, MR,  a.fib on no ac secondary to frequent falls, PVD on pletal presents with SOB. Cardiac workup demonstrates severe MR and multivessel disease.

## 2021-08-30 ENCOUNTER — RESULT REVIEW (OUTPATIENT)
Age: 76
End: 2021-08-30

## 2021-08-30 ENCOUNTER — APPOINTMENT (OUTPATIENT)
Dept: CARDIOTHORACIC SURGERY | Facility: HOSPITAL | Age: 76
End: 2021-08-30

## 2021-08-30 LAB
ALBUMIN SERPL ELPH-MCNC: 2.7 G/DL — LOW (ref 3.3–5)
ALP SERPL-CCNC: 57 U/L — SIGNIFICANT CHANGE UP (ref 40–120)
ALT FLD-CCNC: 8 U/L — LOW (ref 10–45)
ANION GAP SERPL CALC-SCNC: 14 MMOL/L — SIGNIFICANT CHANGE UP (ref 5–17)
APTT BLD: 35.8 SEC — HIGH (ref 27.5–35.5)
AST SERPL-CCNC: 32 U/L — SIGNIFICANT CHANGE UP (ref 10–40)
BASE EXCESS BLDV CALC-SCNC: -0.8 MMOL/L — SIGNIFICANT CHANGE UP (ref -2–2)
BASE EXCESS BLDV CALC-SCNC: -1.5 MMOL/L — SIGNIFICANT CHANGE UP (ref -2–2)
BASE EXCESS BLDV CALC-SCNC: -2.3 MMOL/L — LOW (ref -2–2)
BASE EXCESS BLDV CALC-SCNC: -2.7 MMOL/L — LOW (ref -2–2)
BASE EXCESS BLDV CALC-SCNC: -3.4 MMOL/L — LOW (ref -2–2)
BASE EXCESS BLDV CALC-SCNC: -3.6 MMOL/L — LOW (ref -2–2)
BASE EXCESS BLDV CALC-SCNC: -5.1 MMOL/L — LOW (ref -2–2)
BASOPHILS # BLD AUTO: 0 K/UL — SIGNIFICANT CHANGE UP (ref 0–0.2)
BASOPHILS NFR BLD AUTO: 0 % — SIGNIFICANT CHANGE UP (ref 0–2)
BILIRUB SERPL-MCNC: 1.4 MG/DL — HIGH (ref 0.2–1.2)
BLOOD GAS VENOUS - CREATININE: SIGNIFICANT CHANGE UP MG/DL (ref 0.5–1.3)
BUN SERPL-MCNC: 35 MG/DL — HIGH (ref 7–23)
CA-I SERPL-SCNC: 0.96 MMOL/L — LOW (ref 1.15–1.33)
CA-I SERPL-SCNC: 1 MMOL/L — LOW (ref 1.15–1.33)
CA-I SERPL-SCNC: 1 MMOL/L — LOW (ref 1.15–1.33)
CA-I SERPL-SCNC: 1.03 MMOL/L — LOW (ref 1.15–1.33)
CA-I SERPL-SCNC: 1.04 MMOL/L — LOW (ref 1.15–1.33)
CA-I SERPL-SCNC: 1.5 MMOL/L — HIGH (ref 1.15–1.33)
CALCIUM SERPL-MCNC: 9.1 MG/DL — SIGNIFICANT CHANGE UP (ref 8.4–10.5)
CHLORIDE BLDV-SCNC: 101 MMOL/L — SIGNIFICANT CHANGE UP (ref 96–108)
CHLORIDE BLDV-SCNC: 102 MMOL/L — SIGNIFICANT CHANGE UP (ref 96–108)
CHLORIDE BLDV-SCNC: 103 MMOL/L — SIGNIFICANT CHANGE UP (ref 96–108)
CHLORIDE BLDV-SCNC: 103 MMOL/L — SIGNIFICANT CHANGE UP (ref 96–108)
CHLORIDE BLDV-SCNC: 104 MMOL/L — SIGNIFICANT CHANGE UP (ref 96–108)
CHLORIDE BLDV-SCNC: 107 MMOL/L — SIGNIFICANT CHANGE UP (ref 96–108)
CHLORIDE SERPL-SCNC: 107 MMOL/L — SIGNIFICANT CHANGE UP (ref 96–108)
CK MB BLD-MCNC: 16.1 % — HIGH (ref 0–3.5)
CK MB CFR SERPL CALC: 43.5 NG/ML — HIGH (ref 0–6.7)
CK SERPL-CCNC: 271 U/L — HIGH (ref 30–200)
CO2 BLDV-SCNC: 22 MMOL/L — SIGNIFICANT CHANGE UP (ref 22–26)
CO2 BLDV-SCNC: 23 MMOL/L — SIGNIFICANT CHANGE UP (ref 22–26)
CO2 BLDV-SCNC: 24 MMOL/L — SIGNIFICANT CHANGE UP (ref 22–26)
CO2 BLDV-SCNC: 25 MMOL/L — SIGNIFICANT CHANGE UP (ref 22–26)
CO2 BLDV-SCNC: 25 MMOL/L — SIGNIFICANT CHANGE UP (ref 22–26)
CO2 BLDV-SCNC: 26 MMOL/L — SIGNIFICANT CHANGE UP (ref 22–26)
CO2 BLDV-SCNC: 27 MMOL/L — HIGH (ref 22–26)
CO2 SERPL-SCNC: 20 MMOL/L — LOW (ref 22–31)
CREAT SERPL-MCNC: 0.85 MG/DL — SIGNIFICANT CHANGE UP (ref 0.5–1.3)
EOSINOPHIL # BLD AUTO: 0.14 K/UL — SIGNIFICANT CHANGE UP (ref 0–0.5)
EOSINOPHIL NFR BLD AUTO: 1 % — SIGNIFICANT CHANGE UP (ref 0–6)
FIBRINOGEN PPP-MCNC: 271 MG/DL — LOW (ref 290–520)
GAS PNL BLDA: SIGNIFICANT CHANGE UP
GAS PNL BLDV: 134 MMOL/L — LOW (ref 136–145)
GAS PNL BLDV: 135 MMOL/L — LOW (ref 136–145)
GAS PNL BLDV: 135 MMOL/L — LOW (ref 136–145)
GAS PNL BLDV: 136 MMOL/L — SIGNIFICANT CHANGE UP (ref 136–145)
GAS PNL BLDV: 136 MMOL/L — SIGNIFICANT CHANGE UP (ref 136–145)
GAS PNL BLDV: 137 MMOL/L — SIGNIFICANT CHANGE UP (ref 136–145)
GAS PNL BLDV: SIGNIFICANT CHANGE UP
GLUCOSE BLDC GLUCOMTR-MCNC: 182 MG/DL — HIGH (ref 70–99)
GLUCOSE BLDC GLUCOMTR-MCNC: 191 MG/DL — HIGH (ref 70–99)
GLUCOSE BLDV-MCNC: 129 MG/DL — HIGH (ref 70–99)
GLUCOSE BLDV-MCNC: 137 MG/DL — HIGH (ref 70–99)
GLUCOSE BLDV-MCNC: 140 MG/DL — HIGH (ref 70–99)
GLUCOSE BLDV-MCNC: 149 MG/DL — HIGH (ref 70–99)
GLUCOSE BLDV-MCNC: 174 MG/DL — HIGH (ref 70–99)
GLUCOSE BLDV-MCNC: 177 MG/DL — HIGH (ref 70–99)
GLUCOSE SERPL-MCNC: 133 MG/DL — HIGH (ref 70–99)
HCO3 BLDV-SCNC: 21 MMOL/L — LOW (ref 22–29)
HCO3 BLDV-SCNC: 22 MMOL/L — SIGNIFICANT CHANGE UP (ref 22–29)
HCO3 BLDV-SCNC: 22 MMOL/L — SIGNIFICANT CHANGE UP (ref 22–29)
HCO3 BLDV-SCNC: 23 MMOL/L — SIGNIFICANT CHANGE UP (ref 22–29)
HCO3 BLDV-SCNC: 24 MMOL/L — SIGNIFICANT CHANGE UP (ref 22–29)
HCO3 BLDV-SCNC: 24 MMOL/L — SIGNIFICANT CHANGE UP (ref 22–29)
HCO3 BLDV-SCNC: 25 MMOL/L — SIGNIFICANT CHANGE UP (ref 22–29)
HCT VFR BLD CALC: 28.4 % — LOW (ref 39–50)
HCT VFR BLDA CALC: 25 % — LOW (ref 39–51)
HCT VFR BLDA CALC: 27 % — LOW (ref 39–51)
HCT VFR BLDA CALC: 27 % — LOW (ref 39–51)
HCT VFR BLDA CALC: 28 % — LOW (ref 39–51)
HCT VFR BLDA CALC: 28 % — LOW (ref 39–51)
HCT VFR BLDA CALC: 30 % — LOW (ref 39–51)
HEPARINASE TEG R TIME: 10 MIN (ref 4.3–8.3)
HEPARINASE TEG R TIME: 5.3 MIN — SIGNIFICANT CHANGE UP (ref 4.3–8.3)
HGB BLD CALC-MCNC: 8.3 G/DL — LOW (ref 12.6–17.4)
HGB BLD CALC-MCNC: 8.9 G/DL — LOW (ref 12.6–17.4)
HGB BLD CALC-MCNC: 8.9 G/DL — LOW (ref 12.6–17.4)
HGB BLD CALC-MCNC: 9.3 G/DL — LOW (ref 12.6–17.4)
HGB BLD CALC-MCNC: 9.4 G/DL — LOW (ref 12.6–17.4)
HGB BLD CALC-MCNC: 9.9 G/DL — LOW (ref 12.6–17.4)
HGB BLD-MCNC: 9.3 G/DL — LOW (ref 13–17)
HOROWITZ INDEX BLDV+IHG-RTO: 70 — SIGNIFICANT CHANGE UP
INR BLD: 1.41 RATIO — HIGH (ref 0.88–1.16)
LACTATE BLDV-MCNC: 0.8 MMOL/L — SIGNIFICANT CHANGE UP (ref 0.7–2)
LACTATE BLDV-MCNC: 0.8 MMOL/L — SIGNIFICANT CHANGE UP (ref 0.7–2)
LACTATE BLDV-MCNC: 0.9 MMOL/L — SIGNIFICANT CHANGE UP (ref 0.7–2)
LACTATE BLDV-MCNC: 1.1 MMOL/L — SIGNIFICANT CHANGE UP (ref 0.7–2)
LACTATE BLDV-MCNC: 1.4 MMOL/L — SIGNIFICANT CHANGE UP (ref 0.7–2)
LACTATE BLDV-MCNC: 1.7 MMOL/L — SIGNIFICANT CHANGE UP (ref 0.7–2)
LYMPHOCYTES # BLD AUTO: 0.85 K/UL — LOW (ref 1–3.3)
LYMPHOCYTES # BLD AUTO: 6 % — LOW (ref 13–44)
MAGNESIUM SERPL-MCNC: 3.5 MG/DL — HIGH (ref 1.6–2.6)
MANUAL SMEAR VERIFICATION: SIGNIFICANT CHANGE UP
MCHC RBC-ENTMCNC: 30.5 PG — SIGNIFICANT CHANGE UP (ref 27–34)
MCHC RBC-ENTMCNC: 32.7 GM/DL — SIGNIFICANT CHANGE UP (ref 32–36)
MCV RBC AUTO: 93.1 FL — SIGNIFICANT CHANGE UP (ref 80–100)
MONOCYTES # BLD AUTO: 0.85 K/UL — SIGNIFICANT CHANGE UP (ref 0–0.9)
MONOCYTES NFR BLD AUTO: 6 % — SIGNIFICANT CHANGE UP (ref 2–14)
NEUTROPHILS # BLD AUTO: 12.32 K/UL — HIGH (ref 1.8–7.4)
NEUTROPHILS NFR BLD AUTO: 82 % — HIGH (ref 43–77)
NEUTS BAND # BLD: 5 % — SIGNIFICANT CHANGE UP (ref 0–8)
NRBC # BLD: 0 /100 — SIGNIFICANT CHANGE UP (ref 0–0)
PCO2 BLDV: 36 MMHG — LOW (ref 42–55)
PCO2 BLDV: 38 MMHG — LOW (ref 42–55)
PCO2 BLDV: 41 MMHG — LOW (ref 42–55)
PCO2 BLDV: 41 MMHG — LOW (ref 42–55)
PCO2 BLDV: 44 MMHG — SIGNIFICANT CHANGE UP (ref 42–55)
PCO2 BLDV: 48 MMHG — SIGNIFICANT CHANGE UP (ref 42–55)
PCO2 BLDV: 56 MMHG — HIGH (ref 42–55)
PH BLDV: 7.26 — LOW (ref 7.32–7.43)
PH BLDV: 7.27 — LOW (ref 7.32–7.43)
PH BLDV: 7.33 — SIGNIFICANT CHANGE UP (ref 7.32–7.43)
PH BLDV: 7.36 — SIGNIFICANT CHANGE UP (ref 7.32–7.43)
PH BLDV: 7.37 — SIGNIFICANT CHANGE UP (ref 7.32–7.43)
PH BLDV: 7.38 — SIGNIFICANT CHANGE UP (ref 7.32–7.43)
PH BLDV: 7.38 — SIGNIFICANT CHANGE UP (ref 7.32–7.43)
PHOSPHATE SERPL-MCNC: 2.1 MG/DL — LOW (ref 2.5–4.5)
PLAT MORPH BLD: NORMAL — SIGNIFICANT CHANGE UP
PLATELET # BLD AUTO: 80 K/UL — LOW (ref 150–400)
PLATELET MAPPING ACTF MAX AMPLITUDE: 21.9 MM (ref 2–19)
PLATELET MAPPING ADP MAXIMUM AMPLITUDE: 60.7 MM — SIGNIFICANT CHANGE UP (ref 45–69)
PLATELET MAPPING ADP PERCENT INHIBITION: 11.2 % — SIGNIFICANT CHANGE UP (ref 0–17)
PLATELET MAPPING HKH MAXIMUM AMPLITUDE: 65.6 MM — SIGNIFICANT CHANGE UP (ref 53–68)
PO2 BLDV: 104 MMHG — HIGH (ref 25–45)
PO2 BLDV: 53 MMHG — HIGH (ref 25–45)
PO2 BLDV: 63 MMHG — HIGH (ref 25–45)
PO2 BLDV: 64 MMHG — HIGH (ref 25–45)
PO2 BLDV: 86 MMHG — HIGH (ref 25–45)
PO2 BLDV: 92 MMHG — HIGH (ref 25–45)
PO2 BLDV: 94 MMHG — HIGH (ref 25–45)
POTASSIUM BLDV-SCNC: 3.8 MMOL/L — SIGNIFICANT CHANGE UP (ref 3.5–5.1)
POTASSIUM BLDV-SCNC: 5.4 MMOL/L — HIGH (ref 3.5–5.1)
POTASSIUM BLDV-SCNC: 5.6 MMOL/L — HIGH (ref 3.5–5.1)
POTASSIUM BLDV-SCNC: 5.7 MMOL/L — HIGH (ref 3.5–5.1)
POTASSIUM BLDV-SCNC: 6.2 MMOL/L — CRITICAL HIGH (ref 3.5–5.1)
POTASSIUM BLDV-SCNC: 6.3 MMOL/L — CRITICAL HIGH (ref 3.5–5.1)
POTASSIUM SERPL-MCNC: 4.2 MMOL/L — SIGNIFICANT CHANGE UP (ref 3.5–5.3)
POTASSIUM SERPL-SCNC: 4.2 MMOL/L — SIGNIFICANT CHANGE UP (ref 3.5–5.3)
PROT SERPL-MCNC: 4.6 G/DL — LOW (ref 6–8.3)
PROTHROM AB SERPL-ACNC: 16.7 SEC — HIGH (ref 10.6–13.6)
RAPIDTEG MAXIMUM AMPLITUDE: 54.4 MM — SIGNIFICANT CHANGE UP (ref 52–70)
RAPIDTEG MAXIMUM AMPLITUDE: 67.7 MM — SIGNIFICANT CHANGE UP (ref 52–70)
RBC # BLD: 3.05 M/UL — LOW (ref 4.2–5.8)
RBC # FLD: 13.7 % — SIGNIFICANT CHANGE UP (ref 10.3–14.5)
RBC BLD AUTO: SIGNIFICANT CHANGE UP
SAO2 % BLDV: 82.5 % — SIGNIFICANT CHANGE UP (ref 67–88)
SAO2 % BLDV: 93.1 % — HIGH (ref 67–88)
SAO2 % BLDV: 93.8 % — HIGH (ref 67–88)
SAO2 % BLDV: 98.1 % — HIGH (ref 67–88)
SAO2 % BLDV: 98.6 % — HIGH (ref 67–88)
SAO2 % BLDV: 99 % — HIGH (ref 67–88)
SAO2 % BLDV: 99.7 % — HIGH (ref 67–88)
SODIUM SERPL-SCNC: 141 MMOL/L — SIGNIFICANT CHANGE UP (ref 135–145)
TEG FUNCTIONAL FIBRINOGEN: 18.1 MM — SIGNIFICANT CHANGE UP (ref 15–32)
TEG FUNCTIONAL FIBRINOGEN: <4 MM (ref 15–32)
TEG MAXIMUM AMPLITUDE: 57.6 MM — SIGNIFICANT CHANGE UP (ref 52–69)
TEG MAXIMUM AMPLITUDE: <40 MM (ref 52–69)
TEG REACTION TIME: 5.7 MIN — SIGNIFICANT CHANGE UP (ref 4.6–9.1)
TEG REACTION TIME: >17 MIN (ref 4.6–9.1)
TROPONIN T, HIGH SENSITIVITY RESULT: 458 NG/L — HIGH (ref 0–51)
WBC # BLD: 14.16 K/UL — HIGH (ref 3.8–10.5)
WBC # FLD AUTO: 14.16 K/UL — HIGH (ref 3.8–10.5)

## 2021-08-30 PROCEDURE — 33533 CABG ARTERIAL SINGLE: CPT

## 2021-08-30 PROCEDURE — 71045 X-RAY EXAM CHEST 1 VIEW: CPT | Mod: 26

## 2021-08-30 PROCEDURE — 33517 CABG ARTERY-VEIN SINGLE: CPT

## 2021-08-30 PROCEDURE — 88305 TISSUE EXAM BY PATHOLOGIST: CPT | Mod: 26

## 2021-08-30 PROCEDURE — 99233 SBSQ HOSP IP/OBS HIGH 50: CPT

## 2021-08-30 PROCEDURE — 33430 REPLACEMENT OF MITRAL VALVE: CPT

## 2021-08-30 RX ORDER — AMINOCAPROIC ACID 500 MG/1
5 TABLET ORAL
Qty: 5 | Refills: 0 | Status: DISCONTINUED | OUTPATIENT
Start: 2021-08-30 | End: 2021-08-31

## 2021-08-30 RX ORDER — FENTANYL CITRATE 50 UG/ML
50 INJECTION INTRAVENOUS ONCE
Refills: 0 | Status: DISCONTINUED | OUTPATIENT
Start: 2021-08-30 | End: 2021-08-30

## 2021-08-30 RX ORDER — DEXTROSE 50 % IN WATER 50 %
50 SYRINGE (ML) INTRAVENOUS
Refills: 0 | Status: DISCONTINUED | OUTPATIENT
Start: 2021-08-30 | End: 2021-08-31

## 2021-08-30 RX ORDER — NOREPINEPHRINE BITARTRATE/D5W 8 MG/250ML
0.12 PLASTIC BAG, INJECTION (ML) INTRAVENOUS
Qty: 8 | Refills: 0 | Status: DISCONTINUED | OUTPATIENT
Start: 2021-08-30 | End: 2021-08-31

## 2021-08-30 RX ORDER — ALBUMIN HUMAN 25 %
250 VIAL (ML) INTRAVENOUS
Refills: 0 | Status: COMPLETED | OUTPATIENT
Start: 2021-08-30 | End: 2021-08-30

## 2021-08-30 RX ORDER — OXYCODONE HYDROCHLORIDE 5 MG/1
5 TABLET ORAL EVERY 6 HOURS
Refills: 0 | Status: DISCONTINUED | OUTPATIENT
Start: 2021-08-30 | End: 2021-08-31

## 2021-08-30 RX ORDER — DEXTROSE 50 % IN WATER 50 %
25 SYRINGE (ML) INTRAVENOUS
Refills: 0 | Status: DISCONTINUED | OUTPATIENT
Start: 2021-08-30 | End: 2021-08-31

## 2021-08-30 RX ORDER — MEPERIDINE HYDROCHLORIDE 50 MG/ML
25 INJECTION INTRAMUSCULAR; INTRAVENOUS; SUBCUTANEOUS ONCE
Refills: 0 | Status: DISCONTINUED | OUTPATIENT
Start: 2021-08-30 | End: 2021-08-30

## 2021-08-30 RX ORDER — MILRINONE LACTATE 1 MG/ML
0.12 INJECTION, SOLUTION INTRAVENOUS
Qty: 20 | Refills: 0 | Status: DISCONTINUED | OUTPATIENT
Start: 2021-08-30 | End: 2021-08-31

## 2021-08-30 RX ORDER — CHLORHEXIDINE GLUCONATE 213 G/1000ML
1 SOLUTION TOPICAL
Refills: 0 | Status: DISCONTINUED | OUTPATIENT
Start: 2021-08-30 | End: 2021-09-16

## 2021-08-30 RX ORDER — SODIUM CHLORIDE 9 MG/ML
1000 INJECTION INTRAMUSCULAR; INTRAVENOUS; SUBCUTANEOUS
Refills: 0 | Status: DISCONTINUED | OUTPATIENT
Start: 2021-08-30 | End: 2021-09-05

## 2021-08-30 RX ORDER — POLYETHYLENE GLYCOL 3350 17 G/17G
17 POWDER, FOR SOLUTION ORAL DAILY
Refills: 0 | Status: DISCONTINUED | OUTPATIENT
Start: 2021-08-30 | End: 2021-09-16

## 2021-08-30 RX ORDER — CHLORHEXIDINE GLUCONATE 213 G/1000ML
15 SOLUTION TOPICAL EVERY 12 HOURS
Refills: 0 | Status: DISCONTINUED | OUTPATIENT
Start: 2021-08-30 | End: 2021-09-02

## 2021-08-30 RX ORDER — AMINOCAPROIC ACID 500 MG/1
5 TABLET ORAL
Qty: 5 | Refills: 0 | Status: DISCONTINUED | OUTPATIENT
Start: 2021-08-30 | End: 2021-08-30

## 2021-08-30 RX ORDER — DOBUTAMINE HCL 250MG/20ML
1.5 VIAL (ML) INTRAVENOUS
Qty: 500 | Refills: 0 | Status: DISCONTINUED | OUTPATIENT
Start: 2021-08-30 | End: 2021-09-01

## 2021-08-30 RX ORDER — ACETAMINOPHEN 500 MG
650 TABLET ORAL EVERY 6 HOURS
Refills: 0 | Status: DISCONTINUED | OUTPATIENT
Start: 2021-08-30 | End: 2021-09-16

## 2021-08-30 RX ORDER — PANTOPRAZOLE SODIUM 20 MG/1
40 TABLET, DELAYED RELEASE ORAL DAILY
Refills: 0 | Status: DISCONTINUED | OUTPATIENT
Start: 2021-08-30 | End: 2021-09-03

## 2021-08-30 RX ORDER — POTASSIUM CHLORIDE 20 MEQ
10 PACKET (EA) ORAL
Refills: 0 | Status: DISCONTINUED | OUTPATIENT
Start: 2021-08-30 | End: 2021-08-31

## 2021-08-30 RX ORDER — INSULIN HUMAN 100 [IU]/ML
3 INJECTION, SOLUTION SUBCUTANEOUS
Qty: 100 | Refills: 0 | Status: DISCONTINUED | OUTPATIENT
Start: 2021-08-30 | End: 2021-09-03

## 2021-08-30 RX ORDER — CEFUROXIME AXETIL 250 MG
1500 TABLET ORAL EVERY 8 HOURS
Refills: 0 | Status: COMPLETED | OUTPATIENT
Start: 2021-08-30 | End: 2021-09-01

## 2021-08-30 RX ORDER — VASOPRESSIN 20 [USP'U]/ML
0.1 INJECTION INTRAVENOUS
Qty: 50 | Refills: 0 | Status: DISCONTINUED | OUTPATIENT
Start: 2021-08-30 | End: 2021-08-31

## 2021-08-30 RX ORDER — LEVOTHYROXINE SODIUM 125 MCG
68 TABLET ORAL AT BEDTIME
Refills: 0 | Status: DISCONTINUED | OUTPATIENT
Start: 2021-08-30 | End: 2021-09-03

## 2021-08-30 RX ORDER — ASPIRIN/CALCIUM CARB/MAGNESIUM 324 MG
300 TABLET ORAL ONCE
Refills: 0 | Status: DISCONTINUED | OUTPATIENT
Start: 2021-08-30 | End: 2021-08-30

## 2021-08-30 RX ORDER — MUPIROCIN 20 MG/G
1 OINTMENT TOPICAL
Refills: 0 | Status: COMPLETED | OUTPATIENT
Start: 2021-08-30 | End: 2021-09-04

## 2021-08-30 RX ADMIN — Medication 1 APPLICATION(S): at 10:02

## 2021-08-30 RX ADMIN — AMINOCAPROIC ACID 250 GM/HR: 500 TABLET ORAL at 23:25

## 2021-08-30 RX ADMIN — Medication 125 MILLILITER(S): at 21:38

## 2021-08-30 RX ADMIN — MUPIROCIN 1 APPLICATION(S): 20 OINTMENT TOPICAL at 06:40

## 2021-08-30 RX ADMIN — Medication 125 MILLILITER(S): at 21:39

## 2021-08-30 RX ADMIN — MUPIROCIN 1 APPLICATION(S): 20 OINTMENT TOPICAL at 22:35

## 2021-08-30 RX ADMIN — Medication 50 MILLIGRAM(S): at 06:40

## 2021-08-30 RX ADMIN — Medication 68 MICROGRAM(S): at 22:35

## 2021-08-30 RX ADMIN — Medication 137 MICROGRAM(S): at 06:40

## 2021-08-30 RX ADMIN — Medication 40 MILLIGRAM(S): at 06:39

## 2021-08-30 RX ADMIN — Medication 100 MILLIGRAM(S): at 22:30

## 2021-08-30 RX ADMIN — CHLORHEXIDINE GLUCONATE 30 MILLILITER(S): 213 SOLUTION TOPICAL at 06:39

## 2021-08-30 RX ADMIN — FENTANYL CITRATE 50 MICROGRAM(S): 50 INJECTION INTRAVENOUS at 23:25

## 2021-08-30 RX ADMIN — FENTANYL CITRATE 50 MICROGRAM(S): 50 INJECTION INTRAVENOUS at 23:40

## 2021-08-30 RX ADMIN — Medication 1500 MILLILITER(S): at 21:49

## 2021-08-30 NOTE — BRIEF OPERATIVE NOTE - NSICDXBRIEFPREOP_GEN_ALL_CORE_FT
PRE-OP DIAGNOSIS:  CAD (coronary artery disease) 30-Aug-2021 20:31:22  Sergei Quijano  Severe mitral regurgitation 30-Aug-2021 20:32:39  Sergei Quijano

## 2021-08-30 NOTE — PROGRESS NOTE ADULT - SUBJECTIVE AND OBJECTIVE BOX
CARDIOLOGY     PROGRESS  NOTE   ________________________________________________    CHIEF COMPLAINT:Patient is a 76y old  Male who presents with a chief complaint of sob (29 Aug 2021 15:53)  no complain  	  REVIEW OF SYSTEMS:  CONSTITUTIONAL: No fever, weight loss, or fatigue  EYES: No eye pain, visual disturbances, or discharge  ENT:  No difficulty hearing, tinnitus, vertigo; No sinus or throat pain  NECK: No pain or stiffness  RESPIRATORY: No cough, wheezing, chills or hemoptysis; No Shortness of Breath  CARDIOVASCULAR: No chest pain, palpitations, passing out, dizziness, or leg swelling  GASTROINTESTINAL: No abdominal or epigastric pain. No nausea, vomiting, or hematemesis; No diarrhea or constipation. No melena or hematochezia.  GENITOURINARY: No dysuria, frequency, hematuria, or incontinence  NEUROLOGICAL: No headaches, memory loss, loss of strength, numbness, or tremors  SKIN: No itching, burning, rashes, or lesions   LYMPH Nodes: No enlarged glands  ENDOCRINE: No heat or cold intolerance; No hair loss  MUSCULOSKELETAL: No joint pain or swelling; No muscle, back, or extremity pain  PSYCHIATRIC: No depression, anxiety, mood swings, or difficulty sleeping  HEME/LYMPH: No easy bruising, or bleeding gums  ALLERGY AND IMMUNOLOGIC: No hives or eczema	    [ ] All others negative	  [ ] Unable to obtain    PHYSICAL EXAM:  T(C): 37.1 (08-30-21 @ 07:01), Max: 37.1 (08-30-21 @ 07:01)  HR: 55 (08-30-21 @ 07:01) (54 - 56)  BP: 148/68 (08-30-21 @ 07:01) (135/64 - 148/71)  RR: 16 (08-30-21 @ 07:01) (16 - 18)  SpO2: 95% (08-30-21 @ 07:01) (94% - 95%)  Wt(kg): --  I&O's Summary    29 Aug 2021 07:01  -  30 Aug 2021 07:00  --------------------------------------------------------  IN: 910 mL / OUT: 2200 mL / NET: -1290 mL        Appearance: Normal	  HEENT:   Normal oral mucosa, PERRL, EOMI	  Lymphatic: No lymphadenopathy  Cardiovascular: Normal S1 S2, No JVD, + murmurs, No edema  Respiratory: Lungs clear to auscultation	  Psychiatry: A & O x 3, Mood & affect appropriate  Gastrointestinal:  Soft, Non-tender, + BS	  Skin: No rashes, No ecchymoses, No cyanosis	  Neurologic: Non-focal  Extremities: Normal range of motion, No clubbing, cyanosis or edema  Vascular: Peripheral pulses palpable 2+ bilaterally    MEDICATIONS  (STANDING):  aspirin enteric coated 81 milliGRAM(s) Oral daily  atorvastatin 40 milliGRAM(s) Oral at bedtime  cefuroxime  IVPB 1500 milliGRAM(s) IV Intermittent once  chlorhexidine 2% Cloths 1 Application(s) Topical daily  chlorhexidine 4% Liquid 1 Application(s) Topical once  clotrimazole 1% Cream 1 Application(s) Topical two times a day  furosemide    Tablet 40 milliGRAM(s) Oral daily  heparin   Injectable 5000 Unit(s) SubCutaneous every 12 hours  levothyroxine 137 MICROGram(s) Oral daily  metoprolol succinate ER 50 milliGRAM(s) Oral daily  mupirocin 2% Nasal 1 Application(s) Nasal every 12 hours  mupirocin 2% Ointment 1 Application(s) Topical two times a day  nystatin Powder 1 Application(s) Topical three times a day  potassium chloride    Tablet ER 20 milliEquivalent(s) Oral daily      TELEMETRY: 	    ECG:  	  RADIOLOGY:  OTHER: 	  	  LABS:	 	    CARDIAC MARKERS:                                11.5   5.65  )-----------( 138      ( 29 Aug 2021 06:30 )             36.1     08-29    139  |  105  |  44<H>  ----------------------------<  113<H>  4.5   |  21<L>  |  1.06    Ca    9.8      29 Aug 2021 06:30      proBNP: Serum Pro-Brain Natriuretic Peptide: 1881 pg/mL (08-18 @ 16:10)    Lipid Profile:   HgA1c:   TSH: Thyroid Stimulating Hormone, Serum: 5.02 uIU/mL (08-25 @ 22:25)          Assessment and plan  ---------------------------  77 yo  h/o  HT PPM, CAD.  LYMPHEDEMA. HTN  pw low back pain and SOB.      Patient states his low back pain began 5 days ago. He denies any trauma or falls. States the pain was across his entire lower back. Denies  leg   numbness, tingling, leg weakness, urinary or bowl incontinence.      He states the pain has improved but states he is still using his walker to walk instead of his cane.     He states of  the past 4 days he has also noticed DUNCAN. States he is only able to walk short distances with becoming dyspnea  and has  leg swelling.     He denies fever, chills, cough, chest pain, orthopnea, PND.      He states he did not take his lasix for the past few days because it makes him urinate too much.   pt is well known to me with hx of MR, htn, cad, a.fib on no ac ?sec to frequent falls, pvd on pletal, s/p ppm.  chf ?sec to non compliant to meds  decrease iv lasix  repeat echo to check MV  check ppm for any a.fib, if not Ac candidate will consider ?watchman  dvt prophylaxis  fu lytes  MR needs to be addressed, will consider ERENDIRA  continue with lasix,  increase losartan/ fu renal function  change lasix to po  ERENDIRA noted, severe MT not clip structure  R/L heart cath  with 2 vessele disease, LAD/LCX  needs CABG/MVR ct surgery called schedule on Tuesday  check carotid doppler  hold losartan prior to surgery sec to increase creatnine  will call to interrogate the ppm today  renal function is improving, lytes noted  decrease lasix to 20 mg daily  ?cabg/ mvr tomorrow

## 2021-08-30 NOTE — PRE-ANESTHESIA EVALUATION ADULT - NSPREOPDXFT_GEN_ALL_CORE
MVP
CAD, mitral regurgitation
Helical Rim Advancement Flap Text: The defect edges were debeveled with a #15 blade scalpel.  Given the location of the defect and the proximity to free margins (helical rim) a double helical rim advancement flap was deemed most appropriate.  Using a sterile surgical marker, the appropriate advancement flaps were drawn incorporating the defect and placing the expected incisions between the helical rim and antihelix where possible.  The area thus outlined was incised through and through with a #15 scalpel blade.  With a skin hook and iris scissors, the flaps were gently and sharply undermined and freed up.

## 2021-08-30 NOTE — BRIEF OPERATIVE NOTE - NSICDXBRIEFPROCEDURE_GEN_ALL_CORE_FT
PROCEDURES:  Replacement, mitral valve, with ERENDIRA 30-Aug-2021 20:34:27  Sergei Quijano  CABG, with endoscopic vein harvesting 30-Aug-2021 20:34:44  Sergei Quijano

## 2021-08-30 NOTE — PRE-ANESTHESIA EVALUATION ADULT - LAST ECHOCARDIOGRAM
8/2021 ERENDIRA- EF 60% severe mr w/ bileaflet mitral prolapse, flow reversal in pulmonary veins, mild-mod AI, mild TR/NV

## 2021-08-30 NOTE — PROGRESS NOTE ADULT - ASSESSMENT
76  year old male          h/o   , CAD s/p stent on ASA, A-fib/ PPM, , hypothyroid, and total left knee replacement       s/p rash across the upper torso/ back  for past week, pruritic on R arm, and rash in the groin      prior  PPM  interrogation  with  [facility]/   s/p  brief   bursts  of  afib/ , on prior visit         * admitted with sob,  from acute  diastolic  chf, related  to non compliance  with his  lasix   wound care/  lymphedema,  carla left  leg     *  CAD/ AFIb  /PPM,/ HTN     prior stent in 2007   on  on asa.,  toprol,  cozaar/ lipitor,  synthroid   *h/o  AFIB,    no  a/c       *Anemia, , hb stable, had  been seen by  gi  eval dr joann sanchez in past    right leg with distal redness.  not cellulitis / c/c  for past few  months  an d left leg lymphedema     xray spine. horse  shoe left kidney/ OA  of  spine  on    lasix,  ha  simproved    * Echo, normal  ef/ severe MR     ERENDIRA ,  severe  MR    cath, with 2 vessel disease   plan, CABG and  MVR on monday/  surg d r marni   pedal edema has lessened    ot  is  stable, on asa, lipitor, lasix,  toprol   plan is cabg today /  spoke with wife      f/p wih  dr mikayla dick

## 2021-08-30 NOTE — PRE-ANESTHESIA EVALUATION ADULT - NSANTHOSAYNRD_GEN_A_CORE
No. GENESIS screening performed.  STOP BANG Legend: 0-2 = LOW Risk; 3-4 = INTERMEDIATE Risk; 5-8 = HIGH Risk
No. GENESIS screening performed.  STOP BANG Legend: 0-2 = LOW Risk; 3-4 = INTERMEDIATE Risk; 5-8 = HIGH Risk

## 2021-08-30 NOTE — PRE-ANESTHESIA EVALUATION ADULT - NSANTHPMHFT_GEN_ALL_CORE
76M h/o hypothyroidism, LE lymphedema, asbestosis, HTN, MR, afib s/p PPM, CAD s/p stent (2007), uses walker admitted with worsening LBP & DUNCAN.

## 2021-08-30 NOTE — PROGRESS NOTE ADULT - SUBJECTIVE AND OBJECTIVE BOX
afberile    REVIEW OF SYSTEMS:  GEN: no fever,    no chills  RESP: no SOB,   no cough  CVS: no chest pain,   no palpitations  GI: no abdominal pain,   no nausea,   no vomiting,   no constipation,   no diarrhea  : no dysuria,   no frequency  NEURO: no headache,   no dizziness  PSYCH: no depression,   not anxious  Derm : no rash    MEDICATIONS  (STANDING):  aspirin enteric coated 81 milliGRAM(s) Oral daily  atorvastatin 40 milliGRAM(s) Oral at bedtime  cefuroxime  IVPB 1500 milliGRAM(s) IV Intermittent once  chlorhexidine 2% Cloths 1 Application(s) Topical daily  chlorhexidine 4% Liquid 1 Application(s) Topical once  clotrimazole 1% Cream 1 Application(s) Topical two times a day  furosemide    Tablet 40 milliGRAM(s) Oral daily  heparin   Injectable 5000 Unit(s) SubCutaneous every 12 hours  levothyroxine 137 MICROGram(s) Oral daily  metoprolol succinate ER 50 milliGRAM(s) Oral daily  mupirocin 2% Nasal 1 Application(s) Nasal every 12 hours  mupirocin 2% Ointment 1 Application(s) Topical two times a day  nystatin Powder 1 Application(s) Topical three times a day  potassium chloride    Tablet ER 20 milliEquivalent(s) Oral daily    MEDICATIONS  (PRN):      Vital Signs Last 24 Hrs  T(C): 37.1 (30 Aug 2021 07:01), Max: 37.1 (30 Aug 2021 07:01)  T(F): 98.7 (30 Aug 2021 07:01), Max: 98.7 (30 Aug 2021 07:01)  HR: 55 (30 Aug 2021 07:01) (54 - 56)  BP: 148/68 (30 Aug 2021 07:01) (135/64 - 148/71)  BP(mean): 97 (29 Aug 2021 12:45) (97 - 97)  RR: 16 (30 Aug 2021 07:01) (16 - 18)  SpO2: 95% (30 Aug 2021 07:01) (94% - 95%)  CAPILLARY BLOOD GLUCOSE        I&O's Summary    29 Aug 2021 07:01  -  30 Aug 2021 07:00  --------------------------------------------------------  IN: 910 mL / OUT: 2200 mL / NET: -1290 mL        PHYSICAL EXAM:  HEAD:  Atraumatic, Normocephalic  NECK: Supple, No   JVD  CHEST/LUNG:   no     rales,     no,    rhonchi  HEART: Regular rate and rhythm;         murmur  ABDOMEN: Soft, Nontender, ;   EXTREMITIES:   no     edema  NEUROLOGY:  alert    LABS:                        11.5   5.65  )-----------( 138      ( 29 Aug 2021 06:30 )             36.1     08-29    139  |  105  |  44<H>  ----------------------------<  113<H>  4.5   |  21<L>  |  1.06    Ca    9.8      29 Aug 2021 06:30                      Thyroid Stimulating Hormone, Serum: 5.02 uIU/mL (08-25 @ 22:25)          Consultant(s) Notes Reviewed:      Care Discussed with Consultants/Other Providers:

## 2021-08-30 NOTE — PROGRESS NOTE ADULT - ASSESSMENT
anemia  afib  cad    no overt gi bleed  denies melena/hematochezia  hgb stable  cont regular diet  had egd/colon 12/2020 (cher metha)  no objection to continue asa/pletal  s/p ERENDIRA, severe MR  plan for CABG/ MVR tomorrow  cardio following   will follow   dw patient     Advanced care planning was discussed with patient and family.  Advanced care planning forms were reviewed and discussed.  Risks, benefits and alternatives of gastroenterologic procedures were discussed in detail and all questions were answered.    30 minutes spent.

## 2021-08-30 NOTE — BRIEF OPERATIVE NOTE - NSICDXBRIEFPOSTOP_GEN_ALL_CORE_FT
POST-OP DIAGNOSIS:  Severe mitral regurgitation 30-Aug-2021 20:32:50  Sergei Quijano  CAD (coronary artery disease) 30-Aug-2021 20:33:15  Sergei Quijano

## 2021-08-30 NOTE — PRE-ANESTHESIA EVALUATION ADULT - NS MD HP INPLANTS MED DEV
left hip hardware, latha in left leg, stent, pacemaker/Lens implant
left hip hardware, latha in left leg, stent, pacemaker/Lens implant

## 2021-08-30 NOTE — PRE-ANESTHESIA EVALUATION ADULT - LAST CARDIAC ANGIOGRAM/IMAGING
8/24/21- LM WNL, 75% mid-LAD, 80% mid-LCX and 70% RPDA stenoses
pt unsure of last angio, had stent placed in 2007

## 2021-08-30 NOTE — PROGRESS NOTE ADULT - SUBJECTIVE AND OBJECTIVE BOX
Patient seen and examined at the bedside.    Remained critically ill on continuous ICU monitoring.    OBJECTIVE:  Vital Signs Last 24 Hrs  T(C): 35.6 (30 Aug 2021 20:30), Max: 37.1 (30 Aug 2021 07:01)  T(F): 96.1 (30 Aug 2021 20:30), Max: 98.7 (30 Aug 2021 07:01)  HR: 100 (30 Aug 2021 21:00) (54 - 100)  BP: 148/68 (30 Aug 2021 07:01) (148/68 - 148/68)  BP(mean): --  RR: 12 (30 Aug 2021 21:00) (12 - 18)  SpO2: 97% (30 Aug 2021 21:00) (95% - 99%)    REVIEW OF SYSTEMS:  [x] Unable to assess ROS due to pt being intubated    Physical Exam:  General: Intubated with multiple lines and tubes  Neurology: Sedated  Eyes: PERRLA/ EOMI, Gross vision intact  ENT/Neck: Neck supple, +ETT midline, No JVD, Gross hearing intact  Respiratory: No wheezing, rhonchi. Rales noted bilaterally.  CV: RRR, S1S2, no murmurs, rubs or gallops        [x] Sternal dressing, [x] Mediastinal CT, [x] Pleural CT        [] Sinus rhythm, [] Afib  [x] PPM  Abdominal: Soft, NT, ND +BS,   Extremities: 1-2+ pedal edema noted, + peripheral pulses  Skin: No Rashes, Hematoma, Ecchymosis                         LINES:  [x] Arterial Line   [x] Central Line  [x] Ventilator  [x] pacemaker       Assessment:  CAD w/ prior stent in   s/p CABG, MVR POD#0  Cardiogenic shock / A-fib / Severe MR   Anemia   Hemodynamically instability requiring dual pressor support and inotropic support   Hypothyroidism     Plan:   ***Neuro***  [x] Sedated      ***Cardiovascular***  CAD s/p CABG, MVR POD#0  Cardiogenic shock   Hematocrit 28.4%, lactate 1.7  Transfuse pRBCs if remain hemodynamically unstable   PPM with depletion of battery. Pt at risk for requiring epicardial wires    Hemodynamic lability, instability. Requires IVCD [x] [x]Primacor  [x]Levo  [x] Vaso    Chest tubes:  [x] non-bleeding      ***Pulmonary***  Ventilator Management:  [x]AC-rest   Mode: AC/ CMV (Assist Control/ Continuous Mandatory Ventilation)  RR (machine): 12  TV (machine): 550  FiO2: 70  PEEP: 5  ITime: 1  MAP: 10  PIP: 21            Will plan to wean & extubate once pt is hemodynamically stable.     ***GI***  Stress ulcer prophylaxis: [x] Protonix     ***Renal***  Continue to monitor I/Os, BUN/Creatinine.   Replete lytes PRN. Keep K> 4 and Mg >2.    I&O's Summary    29 Aug 2021 07:01  -  30 Aug 2021 07:00  --------------------------------------------------------  IN: 910 mL / OUT: 2200 mL / NET: -1290 mL    30 Aug 2021 07:  -  30 Aug 2021 21:29  --------------------------------------------------------  IN: 576.3 mL / OUT: 970 mL / NET: -393.7 mL      ***ID***  Afebrile, WBC currently at 14.16   Monitor temperature and trend WBC.   Continue Cefuroxime for postoperative antibiotic coverage,     ***Endocrine***  [x] Hyperglycemia: HbA1c 6.0%             - [x] Insulin gtt              - Need tight glycemic control to prevent wound infection.    [x] Acquired Hypothyroidism: Continue with Synthroid.          Patient requires continuous monitoring with bedside rhythm monitoring, pulse oximetry monitoring, and continuous central venous and arterial pressure monitoring; and intermittent blood gas analysis. Care plan discussed with the ICU care team.   Patient remained critical, at risk for life threatening decompensation.    I have spent 45 minutes providing critical care management to this patient.    By signing my name below, I, Monae Catalan, attest that this documentation has been prepared under the direction and in the presence of Shreya Fitzgerald MD   Electronically signed: Alexandra Porter, 21 @ 21:29    I, Shreya Fitzgerald, personally performed the services described in this documentation. all medical record entries made by the scribe were at my direction and in my presence. I have reviewed the chart and agree that the record reflects my personal performance and is accurate and complete  Electronically signed: Shreya Fitzgerald MD  Patient seen and examined at the bedside.    Remained critically ill on continuous ICU monitoring.    OBJECTIVE:  Vital Signs Last 24 Hrs  T(C): 35.6 (30 Aug 2021 20:30), Max: 37.1 (30 Aug 2021 07:01)  T(F): 96.1 (30 Aug 2021 20:30), Max: 98.7 (30 Aug 2021 07:01)  HR: 100 (30 Aug 2021 21:00) (54 - 100)  BP: 148/68 (30 Aug 2021 07:01) (148/68 - 148/68)  BP(mean): --  RR: 12 (30 Aug 2021 21:00) (12 - 18)  SpO2: 97% (30 Aug 2021 21:00) (95% - 99%)    REVIEW OF SYSTEMS:  [x] Unable to assess ROS due to pt being intubated    Physical Exam:  General: Intubated with multiple lines, gtt  and tubes  Neurology: Post op / unresponsive /   Eyes: L Pupil Large irregular non reactive to light, R pupil small reactive to light   ENT/Neck: Neck supple, +ETT midline, No JVD  Respiratory: Rales noted bilaterally, L chest PPM   CV: SB / no murmur         [x] Sternal dressing, [x] Mediastinal CT, [x] Pleural CT        [] Sinus rhythm,  [x] PPM  Abdominal: Soft, NT, ND +BS,   Extremities: 1-2+ pedal edema noted, + peripheral pulses  Skin: No Rashes, Hematoma, Ecchymosis                         LINES:  [x] Arterial Line   [x] Central Line  [x] Ventilator  [x] pacemaker       Assessment:    77 yr old male HTN, CAD, S/P PCI , A Fib, S/P Dual PPM  , Severe MR with worsening valvular heart failure, Hypothyroidism  S/P CABG X2 , MVR (T) POD # 0  Post cardiotomy multifactorial shock requiring high dose dual pressors w inotrope   ERENDIRA intra operative Biventricular dilation / v RV function   Hypovolemia   Coagulopathy / abnormal TEG /   PPM / Battery depletion / ALBA / battery life <3 months  Prediabetes / AIC 6 / Hyperglycemia / Hypothyroidism     Plan:   ***Neuro***  [x] Sedated  Pupillary exam noted will review history for any cataract surgery   DC Precedex assess neuro exam   Maintain HOB > 40 degree , avoid hypercarbia , hypotension & hypoxia     ***Cardiovascular***  S/P CABG, MVR POD#0  Multifactorial shock   Levo 1.15 mcg/kg/min  Vaso 0.1 unit/kg/min   5 mcg/kg/min  SB / HR 54 / EP at bed side / HR >60 / battery at end of life / Epicardial V wires as a backup   Hematocrit 28.4%, lactate 1.7  CT + output will abnormal TEG scan / will transfuse Plasma & Cryo   Volume loading reassessment of hemodynamics   Will Place [place] Nasreen cath if continues to require high dose pressors   Hemodynamic lability,  Requires IVCD [x] [x]Primacor  [x]Levo  [x] Vaso    Hold ASA       ***Pulmonary***    Ventilator Management:  [x]AC-rest   Mode: AC/ CMV (Assist Control/ Continuous Mandatory Ventilation)  RR (machine): 12  TV (machine): 550  FiO2: 70  PEEP: 5  ITime: 1  MAP: 10  PIP: 21            Will plan to wean & extubate once pt is hemodynamically stable.     ***GI***  Stress ulcer prophylaxis: [x] Protonix     ***Renal***  Continue to monitor I/Os, BUN/Creatinine.   Replete lytes PRN. Keep K> 4 and Mg >2.    I&O's Summary    29 Aug 2021 07:01  -  30 Aug 2021 07:00  --------------------------------------------------------  IN: 910 mL / OUT: 2200 mL / NET: -1290 mL    30 Aug 2021 07:01  -  30 Aug 2021 21:29  --------------------------------------------------------  IN: 576.3 mL / OUT: 970 mL / NET: -393.7 mL      ***ID***  Afebrile, WBC currently at 14.16   Monitor temperature and trend WBC.   Continue Cefuroxime for postoperative antibiotic coverage,     ***Endocrine***  [x] Hyperglycemia: HbA1c 6.0%             - [x] Insulin gtt              - Need tight glycemic control to prevent wound infection.    [x] Acquired Hypothyroidism: Continue with Synthroid.          Patient requires continuous monitoring with bedside rhythm monitoring, pulse oximetry monitoring, and continuous central venous and arterial pressure monitoring; and intermittent blood gas analysis. Care plan discussed with the ICU care team.   Patient remained critical, at risk for life threatening decompensation.    I have spent 45 minutes providing critical care management to this patient.    By signing my name below, I, Monae Catalan, attest that this documentation has been prepared under the direction and in the presence of Shreya Fitzgerald MD   Electronically signed: Alexandra Porter, 21 @ 21:29    I, Shreya Fitzgerald, personally performed the services described in this documentation. all medical record entries made by the caibe were at my direction and in my presence. I have reviewed the chart and agree that the record reflects my personal performance and is accurate and complete  Electronically signed: Shreya Fitzgerald MD

## 2021-08-31 LAB
ALBUMIN SERPL ELPH-MCNC: 3.1 G/DL — LOW (ref 3.3–5)
ALBUMIN SERPL ELPH-MCNC: 3.1 G/DL — LOW (ref 3.3–5)
ALP SERPL-CCNC: 43 U/L — SIGNIFICANT CHANGE UP (ref 40–120)
ALP SERPL-CCNC: 47 U/L — SIGNIFICANT CHANGE UP (ref 40–120)
ALT FLD-CCNC: 11 U/L — SIGNIFICANT CHANGE UP (ref 10–45)
ALT FLD-CCNC: 9 U/L — LOW (ref 10–45)
ANION GAP SERPL CALC-SCNC: 15 MMOL/L — SIGNIFICANT CHANGE UP (ref 5–17)
ANION GAP SERPL CALC-SCNC: 19 MMOL/L — HIGH (ref 5–17)
APTT BLD: 30 SEC — SIGNIFICANT CHANGE UP (ref 27.5–35.5)
APTT BLD: 32 SEC — SIGNIFICANT CHANGE UP (ref 27.5–35.5)
AST SERPL-CCNC: 33 U/L — SIGNIFICANT CHANGE UP (ref 10–40)
AST SERPL-CCNC: 35 U/L — SIGNIFICANT CHANGE UP (ref 10–40)
BASE EXCESS BLDV CALC-SCNC: -4.2 MMOL/L — LOW (ref -2–2)
BASE EXCESS BLDV CALC-SCNC: -4.2 MMOL/L — LOW (ref -2–2)
BASOPHILS # BLD AUTO: 0 K/UL — SIGNIFICANT CHANGE UP (ref 0–0.2)
BASOPHILS NFR BLD AUTO: 0 % — SIGNIFICANT CHANGE UP (ref 0–2)
BILIRUB SERPL-MCNC: 1.5 MG/DL — HIGH (ref 0.2–1.2)
BILIRUB SERPL-MCNC: 2 MG/DL — HIGH (ref 0.2–1.2)
BUN SERPL-MCNC: 30 MG/DL — HIGH (ref 7–23)
BUN SERPL-MCNC: 33 MG/DL — HIGH (ref 7–23)
CALCIUM SERPL-MCNC: 8.4 MG/DL — SIGNIFICANT CHANGE UP (ref 8.4–10.5)
CALCIUM SERPL-MCNC: 8.6 MG/DL — SIGNIFICANT CHANGE UP (ref 8.4–10.5)
CHLORIDE SERPL-SCNC: 105 MMOL/L — SIGNIFICANT CHANGE UP (ref 96–108)
CHLORIDE SERPL-SCNC: 108 MMOL/L — SIGNIFICANT CHANGE UP (ref 96–108)
CO2 BLDV-SCNC: 24 MMOL/L — SIGNIFICANT CHANGE UP (ref 22–26)
CO2 BLDV-SCNC: 24 MMOL/L — SIGNIFICANT CHANGE UP (ref 22–26)
CO2 SERPL-SCNC: 18 MMOL/L — LOW (ref 22–31)
CO2 SERPL-SCNC: 19 MMOL/L — LOW (ref 22–31)
CREAT SERPL-MCNC: 0.9 MG/DL — SIGNIFICANT CHANGE UP (ref 0.5–1.3)
CREAT SERPL-MCNC: 0.98 MG/DL — SIGNIFICANT CHANGE UP (ref 0.5–1.3)
EOSINOPHIL # BLD AUTO: 0.15 K/UL — SIGNIFICANT CHANGE UP (ref 0–0.5)
EOSINOPHIL NFR BLD AUTO: 1.8 % — SIGNIFICANT CHANGE UP (ref 0–6)
FIBRINOGEN PPP-MCNC: 441 MG/DL — SIGNIFICANT CHANGE UP (ref 290–520)
FIBRINOGEN PPP-MCNC: 487 MG/DL — SIGNIFICANT CHANGE UP (ref 290–520)
GAS PNL BLDA: SIGNIFICANT CHANGE UP
GLUCOSE BLDC GLUCOMTR-MCNC: 101 MG/DL — HIGH (ref 70–99)
GLUCOSE BLDC GLUCOMTR-MCNC: 106 MG/DL — HIGH (ref 70–99)
GLUCOSE BLDC GLUCOMTR-MCNC: 107 MG/DL — HIGH (ref 70–99)
GLUCOSE BLDC GLUCOMTR-MCNC: 111 MG/DL — HIGH (ref 70–99)
GLUCOSE BLDC GLUCOMTR-MCNC: 122 MG/DL — HIGH (ref 70–99)
GLUCOSE BLDC GLUCOMTR-MCNC: 123 MG/DL — HIGH (ref 70–99)
GLUCOSE BLDC GLUCOMTR-MCNC: 141 MG/DL — HIGH (ref 70–99)
GLUCOSE BLDC GLUCOMTR-MCNC: 144 MG/DL — HIGH (ref 70–99)
GLUCOSE BLDC GLUCOMTR-MCNC: 146 MG/DL — HIGH (ref 70–99)
GLUCOSE BLDC GLUCOMTR-MCNC: 157 MG/DL — HIGH (ref 70–99)
GLUCOSE BLDC GLUCOMTR-MCNC: 165 MG/DL — HIGH (ref 70–99)
GLUCOSE BLDC GLUCOMTR-MCNC: 173 MG/DL — HIGH (ref 70–99)
GLUCOSE BLDC GLUCOMTR-MCNC: 178 MG/DL — HIGH (ref 70–99)
GLUCOSE BLDC GLUCOMTR-MCNC: 179 MG/DL — HIGH (ref 70–99)
GLUCOSE BLDC GLUCOMTR-MCNC: 202 MG/DL — HIGH (ref 70–99)
GLUCOSE BLDC GLUCOMTR-MCNC: 207 MG/DL — HIGH (ref 70–99)
GLUCOSE BLDC GLUCOMTR-MCNC: 208 MG/DL — HIGH (ref 70–99)
GLUCOSE BLDC GLUCOMTR-MCNC: 223 MG/DL — HIGH (ref 70–99)
GLUCOSE BLDC GLUCOMTR-MCNC: 99 MG/DL — SIGNIFICANT CHANGE UP (ref 70–99)
GLUCOSE SERPL-MCNC: 198 MG/DL — HIGH (ref 70–99)
GLUCOSE SERPL-MCNC: 229 MG/DL — HIGH (ref 70–99)
HCO3 BLDV-SCNC: 23 MMOL/L — SIGNIFICANT CHANGE UP (ref 22–29)
HCO3 BLDV-SCNC: 23 MMOL/L — SIGNIFICANT CHANGE UP (ref 22–29)
HCT VFR BLD CALC: 17.9 % — CRITICAL LOW (ref 39–50)
HCT VFR BLD CALC: 18.4 % — CRITICAL LOW (ref 39–50)
HCT VFR BLD CALC: 21.2 % — LOW (ref 39–50)
HCT VFR BLD CALC: 25.5 % — LOW (ref 39–50)
HGB BLD-MCNC: 5.8 G/DL — CRITICAL LOW (ref 13–17)
HGB BLD-MCNC: 5.9 G/DL — CRITICAL LOW (ref 13–17)
HGB BLD-MCNC: 7.1 G/DL — LOW (ref 13–17)
HGB BLD-MCNC: 8.7 G/DL — LOW (ref 13–17)
HOROWITZ INDEX BLDV+IHG-RTO: 70 — SIGNIFICANT CHANGE UP
HOROWITZ INDEX BLDV+IHG-RTO: 70 — SIGNIFICANT CHANGE UP
IMM GRANULOCYTES NFR BLD AUTO: 0.5 % — SIGNIFICANT CHANGE UP (ref 0–1.5)
INR BLD: 1.21 RATIO — HIGH (ref 0.88–1.16)
INR BLD: 1.26 RATIO — HIGH (ref 0.88–1.16)
LYMPHOCYTES # BLD AUTO: 0.44 K/UL — LOW (ref 1–3.3)
LYMPHOCYTES # BLD AUTO: 5.4 % — LOW (ref 13–44)
MAGNESIUM SERPL-MCNC: 2.8 MG/DL — HIGH (ref 1.6–2.6)
MCHC RBC-ENTMCNC: 29.2 PG — SIGNIFICANT CHANGE UP (ref 27–34)
MCHC RBC-ENTMCNC: 29.4 PG — SIGNIFICANT CHANGE UP (ref 27–34)
MCHC RBC-ENTMCNC: 29.8 PG — SIGNIFICANT CHANGE UP (ref 27–34)
MCHC RBC-ENTMCNC: 30.1 PG — SIGNIFICANT CHANGE UP (ref 27–34)
MCHC RBC-ENTMCNC: 32.1 GM/DL — SIGNIFICANT CHANGE UP (ref 32–36)
MCHC RBC-ENTMCNC: 32.4 GM/DL — SIGNIFICANT CHANGE UP (ref 32–36)
MCHC RBC-ENTMCNC: 33.5 GM/DL — SIGNIFICANT CHANGE UP (ref 32–36)
MCHC RBC-ENTMCNC: 34.1 GM/DL — SIGNIFICANT CHANGE UP (ref 32–36)
MCV RBC AUTO: 87.3 FL — SIGNIFICANT CHANGE UP (ref 80–100)
MCV RBC AUTO: 89.8 FL — SIGNIFICANT CHANGE UP (ref 80–100)
MCV RBC AUTO: 90.9 FL — SIGNIFICANT CHANGE UP (ref 80–100)
MCV RBC AUTO: 91.1 FL — SIGNIFICANT CHANGE UP (ref 80–100)
MONOCYTES # BLD AUTO: 0.45 K/UL — SIGNIFICANT CHANGE UP (ref 0–0.9)
MONOCYTES NFR BLD AUTO: 5.5 % — SIGNIFICANT CHANGE UP (ref 2–14)
NEUTROPHILS # BLD AUTO: 7.13 K/UL — SIGNIFICANT CHANGE UP (ref 1.8–7.4)
NEUTROPHILS NFR BLD AUTO: 86.8 % — HIGH (ref 43–77)
NRBC # BLD: 0 /100 WBCS — SIGNIFICANT CHANGE UP (ref 0–0)
PCO2 BLDV: 47 MMHG — SIGNIFICANT CHANGE UP (ref 42–55)
PCO2 BLDV: 47 MMHG — SIGNIFICANT CHANGE UP (ref 42–55)
PH BLDV: 7.29 — LOW (ref 7.32–7.43)
PH BLDV: 7.29 — LOW (ref 7.32–7.43)
PHOSPHATE SERPL-MCNC: 2 MG/DL — LOW (ref 2.5–4.5)
PLATELET # BLD AUTO: 109 K/UL — LOW (ref 150–400)
PLATELET # BLD AUTO: 66 K/UL — LOW (ref 150–400)
PLATELET # BLD AUTO: 66 K/UL — LOW (ref 150–400)
PLATELET # BLD AUTO: 82 K/UL — LOW (ref 150–400)
PO2 BLDV: 36 MMHG — SIGNIFICANT CHANGE UP (ref 25–45)
PO2 BLDV: 45 MMHG — SIGNIFICANT CHANGE UP (ref 25–45)
POTASSIUM SERPL-MCNC: 3.7 MMOL/L — SIGNIFICANT CHANGE UP (ref 3.5–5.3)
POTASSIUM SERPL-MCNC: 4.4 MMOL/L — SIGNIFICANT CHANGE UP (ref 3.5–5.3)
POTASSIUM SERPL-SCNC: 3.7 MMOL/L — SIGNIFICANT CHANGE UP (ref 3.5–5.3)
POTASSIUM SERPL-SCNC: 4.4 MMOL/L — SIGNIFICANT CHANGE UP (ref 3.5–5.3)
PROT SERPL-MCNC: 4.9 G/DL — LOW (ref 6–8.3)
PROT SERPL-MCNC: 5 G/DL — LOW (ref 6–8.3)
PROTHROM AB SERPL-ACNC: 14.4 SEC — HIGH (ref 10.6–13.6)
PROTHROM AB SERPL-ACNC: 14.9 SEC — HIGH (ref 10.6–13.6)
RBC # BLD: 1.97 M/UL — LOW (ref 4.2–5.8)
RBC # BLD: 2.02 M/UL — LOW (ref 4.2–5.8)
RBC # BLD: 2.36 M/UL — LOW (ref 4.2–5.8)
RBC # BLD: 2.92 M/UL — LOW (ref 4.2–5.8)
RBC # FLD: 13.8 % — SIGNIFICANT CHANGE UP (ref 10.3–14.5)
RBC # FLD: 13.8 % — SIGNIFICANT CHANGE UP (ref 10.3–14.5)
RBC # FLD: 13.9 % — SIGNIFICANT CHANGE UP (ref 10.3–14.5)
RBC # FLD: 15.1 % — HIGH (ref 10.3–14.5)
SAO2 % BLDV: 62.5 % — LOW (ref 67–88)
SAO2 % BLDV: 78 % — SIGNIFICANT CHANGE UP (ref 67–88)
SODIUM SERPL-SCNC: 142 MMOL/L — SIGNIFICANT CHANGE UP (ref 135–145)
SODIUM SERPL-SCNC: 142 MMOL/L — SIGNIFICANT CHANGE UP (ref 135–145)
WBC # BLD: 6.31 K/UL — SIGNIFICANT CHANGE UP (ref 3.8–10.5)
WBC # BLD: 7.95 K/UL — SIGNIFICANT CHANGE UP (ref 3.8–10.5)
WBC # BLD: 8.13 K/UL — SIGNIFICANT CHANGE UP (ref 3.8–10.5)
WBC # BLD: 8.21 K/UL — SIGNIFICANT CHANGE UP (ref 3.8–10.5)
WBC # FLD AUTO: 6.31 K/UL — SIGNIFICANT CHANGE UP (ref 3.8–10.5)
WBC # FLD AUTO: 7.95 K/UL — SIGNIFICANT CHANGE UP (ref 3.8–10.5)
WBC # FLD AUTO: 8.13 K/UL — SIGNIFICANT CHANGE UP (ref 3.8–10.5)
WBC # FLD AUTO: 8.21 K/UL — SIGNIFICANT CHANGE UP (ref 3.8–10.5)

## 2021-08-31 PROCEDURE — 71045 X-RAY EXAM CHEST 1 VIEW: CPT | Mod: 26,77

## 2021-08-31 PROCEDURE — 99292 CRITICAL CARE ADDL 30 MIN: CPT

## 2021-08-31 PROCEDURE — 36620 INSERTION CATHETER ARTERY: CPT | Mod: 79

## 2021-08-31 PROCEDURE — 99291 CRITICAL CARE FIRST HOUR: CPT

## 2021-08-31 PROCEDURE — 71045 X-RAY EXAM CHEST 1 VIEW: CPT | Mod: 26

## 2021-08-31 PROCEDURE — 93010 ELECTROCARDIOGRAM REPORT: CPT

## 2021-08-31 RX ORDER — FUROSEMIDE 40 MG
20 TABLET ORAL ONCE
Refills: 0 | Status: COMPLETED | OUTPATIENT
Start: 2021-08-31 | End: 2021-08-31

## 2021-08-31 RX ORDER — POTASSIUM CHLORIDE 20 MEQ
10 PACKET (EA) ORAL
Refills: 0 | Status: COMPLETED | OUTPATIENT
Start: 2021-08-31 | End: 2021-08-31

## 2021-08-31 RX ORDER — PROPOFOL 10 MG/ML
10 INJECTION, EMULSION INTRAVENOUS
Qty: 500 | Refills: 0 | Status: DISCONTINUED | OUTPATIENT
Start: 2021-08-31 | End: 2021-09-01

## 2021-08-31 RX ORDER — HYDROMORPHONE HYDROCHLORIDE 2 MG/ML
0.5 INJECTION INTRAMUSCULAR; INTRAVENOUS; SUBCUTANEOUS ONCE
Refills: 0 | Status: DISCONTINUED | OUTPATIENT
Start: 2021-08-31 | End: 2021-08-31

## 2021-08-31 RX ORDER — ALBUMIN HUMAN 25 %
250 VIAL (ML) INTRAVENOUS ONCE
Refills: 0 | Status: COMPLETED | OUTPATIENT
Start: 2021-08-31 | End: 2021-08-31

## 2021-08-31 RX ORDER — ASPIRIN/CALCIUM CARB/MAGNESIUM 324 MG
81 TABLET ORAL DAILY
Refills: 0 | Status: DISCONTINUED | OUTPATIENT
Start: 2021-08-31 | End: 2021-09-02

## 2021-08-31 RX ORDER — FUROSEMIDE 40 MG
20 TABLET ORAL
Qty: 500 | Refills: 0 | Status: DISCONTINUED | OUTPATIENT
Start: 2021-08-31 | End: 2021-09-01

## 2021-08-31 RX ORDER — CALCIUM GLUCONATE 100 MG/ML
1 VIAL (ML) INTRAVENOUS ONCE
Refills: 0 | Status: COMPLETED | OUTPATIENT
Start: 2021-08-31 | End: 2021-08-31

## 2021-08-31 RX ADMIN — HYDROMORPHONE HYDROCHLORIDE 0.5 MILLIGRAM(S): 2 INJECTION INTRAMUSCULAR; INTRAVENOUS; SUBCUTANEOUS at 08:30

## 2021-08-31 RX ADMIN — PROPOFOL 5.77 MICROGRAM(S)/KG/MIN: 10 INJECTION, EMULSION INTRAVENOUS at 11:10

## 2021-08-31 RX ADMIN — CHLORHEXIDINE GLUCONATE 15 MILLILITER(S): 213 SOLUTION TOPICAL at 05:16

## 2021-08-31 RX ADMIN — Medication 81 MILLIGRAM(S): at 15:23

## 2021-08-31 RX ADMIN — Medication 100 MILLIEQUIVALENT(S): at 01:55

## 2021-08-31 RX ADMIN — HYDROMORPHONE HYDROCHLORIDE 0.5 MILLIGRAM(S): 2 INJECTION INTRAMUSCULAR; INTRAVENOUS; SUBCUTANEOUS at 12:15

## 2021-08-31 RX ADMIN — Medication 100 MILLIEQUIVALENT(S): at 03:00

## 2021-08-31 RX ADMIN — Medication 100 MILLIEQUIVALENT(S): at 05:16

## 2021-08-31 RX ADMIN — Medication 100 MILLIEQUIVALENT(S): at 03:59

## 2021-08-31 RX ADMIN — Medication 100 GRAM(S): at 01:04

## 2021-08-31 RX ADMIN — MUPIROCIN 1 APPLICATION(S): 20 OINTMENT TOPICAL at 05:16

## 2021-08-31 RX ADMIN — Medication 20 MILLIGRAM(S): at 11:57

## 2021-08-31 RX ADMIN — Medication 20 MILLIGRAM(S): at 18:08

## 2021-08-31 RX ADMIN — Medication 8.66 MICROGRAM(S)/KG/MIN: at 20:37

## 2021-08-31 RX ADMIN — PROPOFOL 5.77 MICROGRAM(S)/KG/MIN: 10 INJECTION, EMULSION INTRAVENOUS at 20:38

## 2021-08-31 RX ADMIN — Medication 21.6 MICROGRAM(S)/KG/MIN: at 11:09

## 2021-08-31 RX ADMIN — Medication 14.4 MICROGRAM(S)/KG/MIN: at 11:09

## 2021-08-31 RX ADMIN — CHLORHEXIDINE GLUCONATE 1 APPLICATION(S): 213 SOLUTION TOPICAL at 05:12

## 2021-08-31 RX ADMIN — HYDROMORPHONE HYDROCHLORIDE 0.5 MILLIGRAM(S): 2 INJECTION INTRAMUSCULAR; INTRAVENOUS; SUBCUTANEOUS at 21:00

## 2021-08-31 RX ADMIN — Medication 100 MILLIGRAM(S): at 21:02

## 2021-08-31 RX ADMIN — Medication 100 MILLIGRAM(S): at 05:17

## 2021-08-31 RX ADMIN — Medication 83.33 MILLIMOLE(S): at 02:31

## 2021-08-31 RX ADMIN — Medication 8.66 MICROGRAM(S)/KG/MIN: at 18:00

## 2021-08-31 RX ADMIN — Medication 68 MICROGRAM(S): at 21:03

## 2021-08-31 RX ADMIN — Medication 100 MILLIGRAM(S): at 13:00

## 2021-08-31 RX ADMIN — MUPIROCIN 1 APPLICATION(S): 20 OINTMENT TOPICAL at 17:29

## 2021-08-31 RX ADMIN — PANTOPRAZOLE SODIUM 40 MILLIGRAM(S): 20 TABLET, DELAYED RELEASE ORAL at 11:08

## 2021-08-31 RX ADMIN — HYDROMORPHONE HYDROCHLORIDE 0.5 MILLIGRAM(S): 2 INJECTION INTRAMUSCULAR; INTRAVENOUS; SUBCUTANEOUS at 08:45

## 2021-08-31 RX ADMIN — HYDROMORPHONE HYDROCHLORIDE 0.5 MILLIGRAM(S): 2 INJECTION INTRAMUSCULAR; INTRAVENOUS; SUBCUTANEOUS at 20:38

## 2021-08-31 RX ADMIN — Medication 10 MG/HR: at 20:39

## 2021-08-31 RX ADMIN — HYDROMORPHONE HYDROCHLORIDE 0.5 MILLIGRAM(S): 2 INJECTION INTRAMUSCULAR; INTRAVENOUS; SUBCUTANEOUS at 12:30

## 2021-08-31 RX ADMIN — INSULIN HUMAN 3 UNIT(S)/HR: 100 INJECTION, SOLUTION SUBCUTANEOUS at 20:37

## 2021-08-31 RX ADMIN — INSULIN HUMAN 3 UNIT(S)/HR: 100 INJECTION, SOLUTION SUBCUTANEOUS at 11:10

## 2021-08-31 RX ADMIN — Medication 100 MILLIEQUIVALENT(S): at 02:31

## 2021-08-31 RX ADMIN — CHLORHEXIDINE GLUCONATE 15 MILLILITER(S): 213 SOLUTION TOPICAL at 17:29

## 2021-08-31 RX ADMIN — Medication 125 MILLILITER(S): at 09:14

## 2021-08-31 NOTE — PROGRESS NOTE ADULT - SUBJECTIVE AND OBJECTIVE BOX
CARDIOLOGY     PROGRESS  NOTE   ________________________________________________    CHIEF COMPLAINT:Patient is a 76y old  Male who presents with a chief complaint of sob (31 Aug 2021 06:44)  s/p cabg / mvr.  events noted post op.  	  REVIEW OF SYSTEMS:  CONSTITUTIONAL: No fever, weight loss, or fatigue  EYES: No eye pain, visual disturbances, or discharge  ENT:  No difficulty hearing, tinnitus, vertigo; No sinus or throat pain  NECK: No pain or stiffness  RESPIRATORY: No cough, wheezing, chills or hemoptysis; No Shortness of Breath  CARDIOVASCULAR: No chest pain, palpitations, passing out, dizziness, or leg swelling  GASTROINTESTINAL: No abdominal or epigastric pain. No nausea, vomiting, or hematemesis; No diarrhea or constipation. No melena or hematochezia.  GENITOURINARY: No dysuria, frequency, hematuria, or incontinence  NEUROLOGICAL: No headaches, memory loss, loss of strength, numbness, or tremors  SKIN: No itching, burning, rashes, or lesions   LYMPH Nodes: No enlarged glands  ENDOCRINE: No heat or cold intolerance; No hair loss  MUSCULOSKELETAL: No joint pain or swelling; No muscle, back, or extremity pain  PSYCHIATRIC: No depression, anxiety, mood swings, or difficulty sleeping  HEME/LYMPH: No easy bruising, or bleeding gums  ALLERGY AND IMMUNOLOGIC: No hives or eczema	    [ ] All others negative	  [x ] Unable to obtain    PHYSICAL EXAM:  T(C): 35.8 (21 @ 08:00), Max: 36.7 (21 @ 00:00)  HR: 70 (21 @ 08:30) (54 - 101)  BP: --  RR: 17 (21 @ 08:30) (12 - 26)  SpO2: 94% (21 @ 08:30) (91% - 99%)  Wt(kg): --  I&O's Summary    30 Aug 2021 07:01  -  31 Aug 2021 07:00  --------------------------------------------------------  IN: 5307.9 mL / OUT: 3360 mL / NET: 1947.9 mL    31 Aug 2021 07:01  -  31 Aug 2021 08:49  --------------------------------------------------------  IN: 114.7 mL / OUT: 155 mL / NET: -40.3 mL        Appearance: Normal	  HEENT:   Normal oral mucosa, PERRL, EOMI	  Lymphatic: No lymphadenopathy  Cardiovascular: Normal S1 S2, No JVD, + murmurs, No edema  Respiratory: Lungs clear to auscultation	  Gastrointestinal:  Soft, Non-tender, + BS	  Skin: No rashes, No ecchymoses, No cyanosis	  Neurologic: Non-focal  Extremities: Normal range of motion, No clubbing, cyanosis or edema  Vascular: Peripheral pulses palpable 2+ bilaterally    MEDICATIONS  (STANDING):  cefuroxime  IVPB 1500 milliGRAM(s) IV Intermittent every 8 hours  chlorhexidine 0.12% Liquid 15 milliLiter(s) Oral Mucosa every 12 hours  chlorhexidine 2% Cloths 1 Application(s) Topical <User Schedule>  dextrose 50% Injectable 50 milliLiter(s) IV Push every 15 minutes  dextrose 50% Injectable 25 milliLiter(s) IV Push every 15 minutes  DOBUTamine Infusion 5 MICROgram(s)/kG/Min (14.4 mL/Hr) IV Continuous <Continuous>  insulin regular Infusion 3 Unit(s)/Hr (3 mL/Hr) IV Continuous <Continuous>  levothyroxine Injectable 68 MICROGram(s) IV Push at bedtime  mupirocin 2% Ointment 1 Application(s) Topical two times a day  norepinephrine Infusion 0.12 MICROgram(s)/kG/Min (21.6 mL/Hr) IV Continuous <Continuous>  pantoprazole  Injectable 40 milliGRAM(s) IV Push daily  polyethylene glycol 3350 17 Gram(s) Oral daily  sodium chloride 0.9%. 1000 milliLiter(s) (10 mL/Hr) IV Continuous <Continuous>  vasopressin Infusion 0.1 Unit(s)/Min (6 mL/Hr) IV Continuous <Continuous>      TELEMETRY: 	    ECG:  	  RADIOLOGY:  OTHER: 	  	  LABS:	 	    CARDIAC MARKERS:  CARDIAC MARKERS ( 30 Aug 2021 20:43 )  x     / x     / 271 U/L / x     / 43.5 ng/mL                                8.7    8.21  )-----------( 109      ( 31 Aug 2021 08:28 )             25.5         142  |  108  |  30<H>  ----------------------------<  198<H>  4.4   |  19<L>  |  0.98    Ca    8.6      31 Aug 2021 04:38  Phos  2.0       Mg     2.8         TPro  4.9<L>  /  Alb  3.1<L>  /  TBili  1.5<H>  /  DBili  x   /  AST  35  /  ALT  11  /  AlkPhos  43      proBNP: Serum Pro-Brain Natriuretic Peptide: 1881 pg/mL ( @ 16:10)    Lipid Profile:   HgA1c:   TSH: Thyroid Stimulating Hormone, Serum: 5.02 uIU/mL ( @ 22:25)    PT/INR - ( 31 Aug 2021 03:36 )   PT: 14.4 sec;   INR: 1.21 ratio         PTT - ( 31 Aug 2021 03:36 )  PTT:32.0 sec      Assessment and plan  ---------------------------  s/p CABG 2 vessels and MVR/ bio  post op bleeding, s/p blood transfusion  sedated on vent  continue vaso/   transfuse as needed  plan as per CTICU

## 2021-08-31 NOTE — PROGRESS NOTE ADULT - SUBJECTIVE AND OBJECTIVE BOX
CRITICAL CARE ATTENDING - CTICU    MEDICATIONS  (STANDING):  cefuroxime  IVPB 1500 milliGRAM(s) IV Intermittent every 8 hours  chlorhexidine 0.12% Liquid 15 milliLiter(s) Oral Mucosa every 12 hours  chlorhexidine 2% Cloths 1 Application(s) Topical <User Schedule>  dextrose 50% Injectable 50 milliLiter(s) IV Push every 15 minutes  dextrose 50% Injectable 25 milliLiter(s) IV Push every 15 minutes  DOBUTamine Infusion 3 MICROgram(s)/kG/Min (8.66 mL/Hr) IV Continuous <Continuous>  insulin regular Infusion 3 Unit(s)/Hr (3 mL/Hr) IV Continuous <Continuous>  levothyroxine Injectable 68 MICROGram(s) IV Push at bedtime  mupirocin 2% Ointment 1 Application(s) Topical two times a day  norepinephrine Infusion 0.12 MICROgram(s)/kG/Min (21.6 mL/Hr) IV Continuous <Continuous>  pantoprazole  Injectable 40 milliGRAM(s) IV Push daily  polyethylene glycol 3350 17 Gram(s) Oral daily  sodium chloride 0.9%. 1000 milliLiter(s) (10 mL/Hr) IV Continuous <Continuous>  vasopressin Infusion 0.1 Unit(s)/Min (6 mL/Hr) IV Continuous <Continuous>                                    7.1    8.13  )-----------( 82       ( 31 Aug 2021 02:51 )             21.2       08-31    142  |  108  |  30<H>  ----------------------------<  198<H>  4.4   |  19<L>  |  0.98    Ca    8.6      31 Aug 2021 04:38  Phos  2.0     08-31  Mg     2.8     08-31    TPro  4.9<L>  /  Alb  3.1<L>  /  TBili  1.5<H>  /  DBili  x   /  AST  35  /  ALT  11  /  AlkPhos  43  08-31      PT/INR - ( 31 Aug 2021 03:36 )   PT: 14.4 sec;   INR: 1.21 ratio         PTT - ( 31 Aug 2021 03:36 )  PTT:32.0 sec    Mode: AC/ CMV (Assist Control/ Continuous Mandatory Ventilation)  RR (machine): 12  TV (machine): 600  FiO2: 60  PEEP: 8  ITime: 0.1  MAP: 10  PIP: 22      Daily Height in cm: 170.18 (30 Aug 2021 13:07)    Daily       08-29 @ 07:01  -  08-30 @ 07:00  --------------------------------------------------------  IN: 910 mL / OUT: 2200 mL / NET: -1290 mL    08-30 @ 07:01  -  08-31 @ 06:45  --------------------------------------------------------  IN: 5307.9 mL / OUT: 3250 mL / NET: 2057.9 mL        Critically Ill patient  : [ ] preoperative ,   [x] post operative    Requires :  [x ] Arterial Line   [x ] Central Line  [ ] PA catheter  [ ] IABP  [ ] ECMO  [ ] LVAD  [x ] Ventilator  [ ] pacemaker [ ] Impella.                      [x ] ABG's     [ x] Pulse Oxymetry Monitoring  Bedside evaluation , monitoring , treatment of hemodynamics , fluids , IVP/ IVCD meds.      Diagnosis:     POD 1 - MVR(T)/ C2L     Chest tube drainage     Requires chest PT, pulmonary toilet, ambu bagging, suctioning to maintain SaO2,  patent airway and treat atelectasis.     Ventilator Management:  [ x]AC-rest    [ ]CPAP-PS Wean    [ ]Trach Collar     [ ]Extubate    [ ] T-Piece  [ ]peep>5     CHF- acute [ x]   chronic [ x]    systolic [ x]   diatolic [ ]          - Echo- EF -       nml      [x ] RV dysfunction          - Cxr-cardiomegally, edema          - Clinical-  [x ]inotropes   [x ]pressors   [ ]diuresis   [ ]IABP   [ ]ECMO   [ ]LVAD   [ ]Respiratory Failure.     Hemodynamic lability,  instability. Requires IVCD [x ] vasopressors [x ] inotropes  [ ] vasodilator  [ ]IVSS fluid  to maintain MAP, perfusion, C.I.     IVCD insulin     Hypotension     Thrombocytopenia     Prerenal azotemia           By signing my name below, I, Mya Can, attest that this documentation has been prepared under the direction and in the presence of Tony Israel MD.   Electronically Signed: Alexandra Coles 08-31-21 @ 06:45      Discussed with CT surgeon, Physician Assistant - Nurse Practitioner- Critical care medicine team.   Discussed at  AM / PM rounds.   Chart, labs , films reviewed.    Cumulative Critical Care Time Given Today:  CRITICAL CARE ATTENDING - CTICU    MEDICATIONS  (STANDING):  cefuroxime  IVPB 1500 milliGRAM(s) IV Intermittent every 8 hours  chlorhexidine 0.12% Liquid 15 milliLiter(s) Oral Mucosa every 12 hours  chlorhexidine 2% Cloths 1 Application(s) Topical <User Schedule>  dextrose 50% Injectable 50 milliLiter(s) IV Push every 15 minutes  dextrose 50% Injectable 25 milliLiter(s) IV Push every 15 minutes  DOBUTamine Infusion 3 MICROgram(s)/kG/Min (8.66 mL/Hr) IV Continuous <Continuous>  insulin regular Infusion 3 Unit(s)/Hr (3 mL/Hr) IV Continuous <Continuous>  levothyroxine Injectable 68 MICROGram(s) IV Push at bedtime  mupirocin 2% Ointment 1 Application(s) Topical two times a day  norepinephrine Infusion 0.12 MICROgram(s)/kG/Min (21.6 mL/Hr) IV Continuous <Continuous>  pantoprazole  Injectable 40 milliGRAM(s) IV Push daily  polyethylene glycol 3350 17 Gram(s) Oral daily  sodium chloride 0.9%. 1000 milliLiter(s) (10 mL/Hr) IV Continuous <Continuous>  vasopressin Infusion 0.1 Unit(s)/Min (6 mL/Hr) IV Continuous <Continuous>                                    7.1    8.13  )-----------( 82       ( 31 Aug 2021 02:51 )             21.2       08-31    142  |  108  |  30<H>  ----------------------------<  198<H>  4.4   |  19<L>  |  0.98    Ca    8.6      31 Aug 2021 04:38  Phos  2.0     08-31  Mg     2.8     08-31    TPro  4.9<L>  /  Alb  3.1<L>  /  TBili  1.5<H>  /  DBili  x   /  AST  35  /  ALT  11  /  AlkPhos  43  08-31      PT/INR - ( 31 Aug 2021 03:36 )   PT: 14.4 sec;   INR: 1.21 ratio         PTT - ( 31 Aug 2021 03:36 )  PTT:32.0 sec    Mode: AC/ CMV (Assist Control/ Continuous Mandatory Ventilation)  RR (machine): 12  TV (machine): 600  FiO2: 60  PEEP: 8  ITime: 0.1  MAP: 10  PIP: 22      Daily Height in cm: 170.18 (30 Aug 2021 13:07)    Daily       08-29 @ 07:01  -  08-30 @ 07:00  --------------------------------------------------------  IN: 910 mL / OUT: 2200 mL / NET: -1290 mL    08-30 @ 07:01  -  08-31 @ 06:45  --------------------------------------------------------  IN: 5307.9 mL / OUT: 3250 mL / NET: 2057.9 mL        Critically Ill patient  : [ ] preoperative ,   [x] post operative    Requires :  [x ] Arterial Line   [x ] Central Line  [ ] PA catheter  [ ] IABP  [ ] ECMO  [ ] LVAD  [x ] Ventilator  [ x] pacemaker - PPM  [ ] Impella.                      [x ] ABG's     [ x] Pulse Oxymetry Monitoring  Bedside evaluation , monitoring , treatment of hemodynamics , fluids , IVP/ IVCD meds.      Diagnosis:     POD 1 - MVR(T) / C2L     Hypotension    Hypovolemia     Post Op Bleeding    Anemia - acute blood loss    s/p transfusion of PRBC's / Blood Products    Chest tube drainage     Requires chest PT, pulmonary toilet, ambu bagging, suctioning to maintain SaO2,  patent airway and treat atelectasis.     Ventilator Management:  [ x]AC-rest    [x ]CPAP-PS Wean    [ ]Trach Collar     [ ]Extubate    [ x] T-Piece  [ ]peep>5     CHF- acute [ x]   chronic [ ]    systolic [ x]   diatolic [ ]          - Echo- EF -       nml      [x ] RV dysfunction          - Cxr-cardiomegally, edema          - Clinical-  [x ]inotropes   [x ]pressors   [ ]diuresis   [ ]IABP   [ ]ECMO   [ ]LVAD   [x ] Ventilator      Hemodynamic lability,  instability. Requires IVCD [x ] vasopressors [x ] inotropes  [ ] vasodilator  [ ]IVSS fluid  to maintain MAP, perfusion, C.I.     IVCD insulin     Hypotension     Thrombocytopenia     Prerenal azotemia     Requires bedside physical therapy, mobilization and total long-term care.     Lactic acidosis          By signing my name below, I, Mya Can, attest that this documentation has been prepared under the direction and in the presence of Tony Israel MD.   Electronically Signed: Alexandra Coles 08-31-21 @ 06:45    I, Tony Israel, personally performed the services described in this documentation. All medical record entries made by the scribe were at my direction and in my presence. I have reviewed the chart and agree that the record reflects my personal performance and is accurate and complete.   Tony Israel MD.       Discussed with CT surgeon, Physician Assistant - Nurse Practitioner- Critical care medicine team.   Discussed at  AM / PM rounds.   Chart, labs , films reviewed.    Cumulative Critical Care Time Given Today:  40 min

## 2021-08-31 NOTE — PROGRESS NOTE ADULT - ASSESSMENT
anemia  afib  cad    s/p CABG and MVR 8/30  no overt gi bleed  denies melena/hematochezia  hgb stable  had egd/colon 12/2020 ([facility] metha)  cbc daily  transfuse PRN  cardio following   will follow   dw patient     Advanced care planning was discussed with patient and family.  Advanced care planning forms were reviewed and discussed.  Risks, benefits and alternatives of gastroenterologic procedures were discussed in detail and all questions were answered.    30 minutes spent.

## 2021-08-31 NOTE — PROGRESS NOTE ADULT - SUBJECTIVE AND OBJECTIVE BOX
INTERVAL HPI/OVERNIGHT EVENTS:  s/p CABG/MVR, now intubated, events noted  hgb stale, s/p 2 units today       MEDICATIONS  (PRN):      Allergies    alfuzosin (Hives)  levofloxacin (Hives)  Myrbetriq (Hives)  tamsulosin (Hives)    Intolerances      ROS   unable to obtain         PHYSICAL EXAM:   Vital Signs:  Vital Signs Last 24 Hrs  T(C): 37.1 (29 Aug 2021 04:21), Max: 37.1 (29 Aug 2021 04:21)  T(F): 98.7 (29 Aug 2021 04:21), Max: 98.7 (29 Aug 2021 04:21)  HR: 55 (29 Aug 2021 04:21) (55 - 78)  BP: 116/61 (29 Aug 2021 04:21) (116/61 - 146/63)  BP(mean): --  RR: 18 (29 Aug 2021 04:21) (18 - 18)  SpO2: 93% (29 Aug 2021 04:21) (93% - 96%)  Daily     Daily Weight in k.2 (29 Aug 2021 07:30)I&O's Summary    28 Aug 2021 07:  -  29 Aug 2021 07:00  --------------------------------------------------------  IN: 1110 mL / OUT: 5 mL / NET: -915 mL    29 Aug 2021 07:01  -  29 Aug 2021 10:27  --------------------------------------------------------  IN: 240 mL / OUT: 800 mL / NET: -560 mL        GENERAL:  Appears stated age, well-groomed, well-nourished, no distress  HEENT:  NC/AT,  conjunctivae clear and pink, no thyromegaly, nodules, adenopathy, no JVD, sclera -anicteric  CHEST:  Full & symmetric excursion, no increased effort, breath sounds clear  HEART:  Regular rhythm, S1, S2, no murmur/rub/S3/S4, no abdominal bruit, no edema  ABDOMEN:  Soft, non-tender, non-distended, normoactive bowel sounds,  no masses ,no hepato-splenomegaly, no signs of chronic liver disease  EXTEREMITIES:  no cyanosis,clubbing or edema  SKIN:  No rash/erythema/ecchymoses/petechiae/wounds/abscess/warm/dry  NEURO:  Alert, oriented, no asterixis, no tremor, no encephalopathy      LABS:                        11.5   5.65  )-----------( 138      ( 29 Aug 2021 06:30 )             36.1         139  |  105  |  44<H>  ----------------------------<  113<H>  4.5   |  21<L>  |  1.06    Ca    9.8      29 Aug 2021 06:30          amylase   lipase  RADIOLOGY & ADDITIONAL TESTS:

## 2021-08-31 NOTE — PROGRESS NOTE ADULT - SUBJECTIVE AND OBJECTIVE BOX
JANEE ARCEO  MRN-[id number]  Patient is a 76y old  Male who presents with a chief complaint of sob (31 Aug 2021 11:46)    HPI:  77 yo M h/o  HTN PPM, CAD.  LYMPHEDEMA. HTN pw low back pain and SOB. Patient states his low back pain began 5 days ago. He denies any trauma or falls. States the pain was across his entire lower back. Denies  leg   numbness, tingling, leg weakness, urinary or bowl incontinence. He states the pain has improved but states he is still using his walker to walk instead of his cane. He states of  the past 4 days he has also noticed DUNCAN. States he is only able to walk short distances with becoming dyspnea  and has  leg swelling.He denies fever, chills, cough, chest pain, orthopnea, PND. He states he did not take his lasix for the past few days because it makes him urinate too much. (18 Aug 2021 19:37)      Surgery/Hospital course:    Today/Overnight:    Vital Signs Last 24 Hrs  T(C): 36.9 (31 Aug 2021 16:00), Max: 36.9 (31 Aug 2021 16:00)  T(F): 98.4 (31 Aug 2021 16:00), Max: 98.4 (31 Aug 2021 16:00)  HR: 74 (31 Aug 2021 19:00) (54 - 101)  BP: --  BP(mean): --  RR: 16 (31 Aug 2021 19:00) (9 - 26)  SpO2: 91% (31 Aug 2021 19:00) (90% - 99%)  ============================I/O===========================  I&O's Summary    30 Aug 2021 07:01  -  31 Aug 2021 07:00  --------------------------------------------------------  IN: 5307.9 mL / OUT: 3360 mL / NET: 1947.9 mL    31 Aug 2021 07:01  -  31 Aug 2021 19:29  --------------------------------------------------------  IN: 835.5 mL / OUT: 1145 mL / NET: -309.5 mL      ============================ LABS =========================                        8.7    8.21  )-----------( 109      ( 31 Aug 2021 08:28 )             25.5     08-31    142  |  108  |  30<H>  ----------------------------<  198<H>  4.4   |  19<L>  |  0.98    Ca    8.6      31 Aug 2021 04:38  Phos  2.0     08-31  Mg     2.8     08-31    TPro  4.9<L>  /  Alb  3.1<L>  /  TBili  1.5<H>  /  DBili  x   /  AST  35  /  ALT  11  /  AlkPhos  43  08-31    LIVER FUNCTIONS - ( 31 Aug 2021 04:38 )  Alb: 3.1 g/dL / Pro: 4.9 g/dL / ALK PHOS: 43 U/L / ALT: 11 U/L / AST: 35 U/L / GGT: x           PT/INR - ( 31 Aug 2021 03:36 )   PT: 14.4 sec;   INR: 1.21 ratio         PTT - ( 31 Aug 2021 03:36 )  PTT:32.0 sec  ABG - ( 31 Aug 2021 16:57 )  pH, Arterial: 7.41  pH, Blood: x     /  pCO2: 37    /  pO2: 70    / HCO3: 24    / Base Excess: -1.0  /  SaO2: 96.7                ======================Micro/Rad/Cardio=================  Culture: Reviewed   CXR: Reviewed  Echo: Reviewed  ======================================================  PAST MEDICAL & SURGICAL HISTORY:  Afib  not on anticoagulation    CAD (coronary artery disease)    Varicose vein of leg    Hypothyroid    Pacemaker    H/O fracture of hip  2017    H/O asbestosis    HTN (hypertension)    Pacemaker  [employer] - Model RVDR01 1/10/2012    Stented coronary artery  drug eluding stent 5/2007    H/O detached retina repair  1952    S/P cataract surgery    History of hip surgery  left hip ORIF    H/O varicose vein stripping  1993 left leg    History of left knee replacement  2013 - multiple infections post op requiring reoperation in 2015, 2017, 2019)    H/O foot surgery  for infection of right foot 2019    H/O inguinal hernia repair  right 1993      ========================ASSESSMENT ================  Coronary Artery Disease status post Coronary Artery Bypass Graft on 8/30   Severe mitral regurgitation with worsening valvular heart failure  ERENDIRA intra operative Biventricular dilation / v RV function   Hypovolemia  Acute Blood Loss Anemia   Thrombocytopenia    PPM / Battery depletion / ALBA / battery life <3 months  Prediabetes / AIC 6 / Hyperglycemia / Hypothyroidism  Obesity OHS / GENESIS      Plan:  ====================== NEUROLOGY=====================  Addressed analgesic regimen to optimize function and sedated for ventilatory synchrony.     acetaminophen   Tablet .. 650 milliGRAM(s) Oral every 6 hours PRN Mild Pain (1 - 3)  propofol Infusion 10 MICROgram(s)/kG/Min (5.77 mL/Hr) IV Continuous <Continuous>    ==================== RESPIRATORY======================  Respiratory status required full ventilatory support, close monitoring of respiratory rate and breathing pattern, the following of ABG’s with A-line monitoring, continuous pulse oximetry monitoring. Increased concern for atelectasis due to obesity related restrictive lung disease.     Mechanical Ventilation:  Mode: AC/ CMV (Assist Control/ Continuous Mandatory Ventilation)  RR (machine): 16  TV (machine): 550  FiO2: 60  PEEP: 10  ITime: 1  MAP: 16  PIP: 35      ====================CARDIOVASCULAR==================  Patient with history of coronary artery disease, subsequently underwent coronary artery bypass grafting procedure on 8/30. Echo showed severe mitral regurgitation with worsening valvular heart failure and ERENDIRA intra operative Biventricular dilation / v RV function on 8/30. Patient is requiring IV Dobutamine for inotropic management. Invasive hemodynamic monitoring with a central venous catheter & an A-line were required for the continuous central venous and MAP/BP monitoring to ensure adequate cardiovascular support. Patient has a Medtronic permanent pacemaker with a back up rate of 60. ASA continued for graft occlusion-thromboembolism prophylaxis. Volume resuscitation ordered for hypovolemic shock.    aspirin  chewable 81 milliGRAM(s) Oral daily  DOBUTamine Infusion 3 MICROgram(s)/kG/Min (8.66 mL/Hr) IV Continuous <Continuous>    ===================HEMATOLOGIC/ONC ===================  Anemia due to acute blood loss and Thrombocytopenia status post 4 units of packed red blood cells, 4 platelets, 3 Cryo, 2 FFP, 2 Amicar. Monitor hemoglobin/hematocrit levels and platelets.    ===================== RENAL =========================  Optimize renal perfusion with adequate volume resuscitation and continued monitoring of urine output, fluid balance, BUN/Creatinine.     ==================== GASTROINTESTINAL===================  NPO after recent procedure, advance diet as tolerated. Continue Protonix for stress ulcer prophylaxis. Bowel regimen with Miralax.     pantoprazole  Injectable 40 milliGRAM(s) IV Push daily  polyethylene glycol 3350 17 Gram(s) Oral daily  sodium chloride 0.9%. 1000 milliLiter(s) (10 mL/Hr) IV Continuous <Continuous>    =======================    ENDOCRINE  =====================  Metabolic stability, stress hyperglycemia required an IV regular Insulin drip while following serial glucose levels to help achieve and maintain euglycemia. Continue Synthroid for Hypothyroidism.    insulin regular Infusion 3 Unit(s)/Hr (3 mL/Hr) IV Continuous <Continuous>  levothyroxine Injectable 68 MICROGram(s) IV Push at bedtime    ========================INFECTIOUS DISEASE================  Afebrile, white blood cells within normal limits. Continue Cefuroxime for perioperative antibiotic coverage.    cefuroxime  IVPB 1500 milliGRAM(s) IV Intermittent every 8 hours      Patient requires continuous monitoring with bedside rhythm monitoring, pulse oximetry monitoring, and continuous central venous and arterial pressure monitoring; and intermittent blood gas analysis.  Care plan discussed with ICU care team.    Patient remained critical, at risk for life threatening decompensation.   I have spent 35 minutes providing acute care with multiple re-evaluations throughout the evening.     By signing my name below, I, Bhumika Mccoy , attest that this documentation has been prepared under the direction and in the presence of Haily Woods MD   Electronically signed: Alexandra Sanford, 08-31-21 @ 19:29    I, Haily Woods, personally performed the services described in this documentation. All medical record entries made by the scribe were at my direction and in my presence. I have reviewed the chart and agree that the record reflects my personal performance and is accurate and complete  Electronically signed: Haily Woods MD 08-31-21 @ 19:29       JANEE ARCEO  MRN-[id number]  Patient is a 76y old  Male who presents with a chief complaint of sob (31 Aug 2021 11:46)    HPI:  75 yo M h/o  HTN PPM, CAD.  LYMPHEDEMA. HTN pw low back pain and SOB. Patient states his low back pain began 5 days ago. He denies any trauma or falls. States the pain was across his entire lower back. Denies  leg   numbness, tingling, leg weakness, urinary or bowl incontinence. He states the pain has improved but states he is still using his walker to walk instead of his cane. He states of  the past 4 days he has also noticed DUNCAN. States he is only able to walk short distances with becoming dyspnea  and has  leg swelling.He denies fever, chills, cough, chest pain, orthopnea, PND. He states he did not take his lasix for the past few days because it makes him urinate too much. (18 Aug 2021 19:37)      Surgery/Hospital course: Patient found to have severe MR and CAD, s/p MVR/C2L 8/30. Early post operative bleeding required transfusion of 4 units of PRBC, 4 platelets, 3 cryo, 2 FFP and 5 grams of Amicar x 2    Today/Overnight: Weaned off of vasopressors, remains intubated due to hypoxia    Vital Signs Last 24 Hrs  T(C): 36.9 (31 Aug 2021 16:00), Max: 36.9 (31 Aug 2021 16:00)  T(F): 98.4 (31 Aug 2021 16:00), Max: 98.4 (31 Aug 2021 16:00)  HR: 74 (31 Aug 2021 19:00) (54 - 101)  BP: --  BP(mean): --  RR: 16 (31 Aug 2021 19:00) (9 - 26)  SpO2: 91% (31 Aug 2021 19:00) (90% - 99%)  ============================I/O===========================  I&O's Summary    30 Aug 2021 07:01  -  31 Aug 2021 07:00  --------------------------------------------------------  IN: 5307.9 mL / OUT: 3360 mL / NET: 1947.9 mL    31 Aug 2021 07:01  -  31 Aug 2021 19:29  --------------------------------------------------------  IN: 835.5 mL / OUT: 1145 mL / NET: -309.5 mL      ============================ LABS =========================                        8.7    8.21  )-----------( 109      ( 31 Aug 2021 08:28 )             25.5     08-31    142  |  108  |  30<H>  ----------------------------<  198<H>  4.4   |  19<L>  |  0.98    Ca    8.6      31 Aug 2021 04:38  Phos  2.0     08-31  Mg     2.8     08-31    TPro  4.9<L>  /  Alb  3.1<L>  /  TBili  1.5<H>  /  DBili  x   /  AST  35  /  ALT  11  /  AlkPhos  43  08-31    LIVER FUNCTIONS - ( 31 Aug 2021 04:38 )  Alb: 3.1 g/dL / Pro: 4.9 g/dL / ALK PHOS: 43 U/L / ALT: 11 U/L / AST: 35 U/L / GGT: x           PT/INR - ( 31 Aug 2021 03:36 )   PT: 14.4 sec;   INR: 1.21 ratio         PTT - ( 31 Aug 2021 03:36 )  PTT:32.0 sec  ABG - ( 31 Aug 2021 16:57 )  pH, Arterial: 7.41  pH, Blood: x     /  pCO2: 37    /  pO2: 70    / HCO3: 24    / Base Excess: -1.0  /  SaO2: 96.7                ======================Micro/Rad/Cardio=================  Culture: Reviewed   CXR: Reviewed  Echo: Reviewed  ======================================================  PAST MEDICAL & SURGICAL HISTORY:  Afib  not on anticoagulation    CAD (coronary artery disease)    Varicose vein of leg    Hypothyroid    Pacemaker    H/O fracture of hip  2017    H/O asbestosis    HTN (hypertension)    Pacemaker  [employer] - Model RVDR01 1/10/2012    Stented coronary artery  drug eluding stent 5/2007    H/O detached retina repair  1952    S/P cataract surgery    History of hip surgery  left hip ORIF    H/O varicose vein stripping  1993 left leg    History of left knee replacement  2013 - multiple infections post op requiring reoperation in 2015, 2017, 2019)    H/O foot surgery  for infection of right foot 2019    H/O inguinal hernia repair  right 1993      ========================ASSESSMENT ================  Coronary Artery Disease status post Coronary Artery Bypass Graft on 8/30   Severe mitral regurgitation with worsening valvular heart failure  ERENDIRA intra operative Biventricular dilation / v RV function   Hypovolemia  Acute Blood Loss Anemia   Thrombocytopenia    PPM / Battery depletion / ALBA / battery life <3 months  Prediabetes / AIC 6 / Hyperglycemia / Hypothyroidism  Obesity OHS / GENESIS      Plan:  ====================== NEUROLOGY=====================  Addressed analgesic regimen to optimize function and sedated an a Diprivan infusion titrated for anxiety and ventilatory synchrony.     acetaminophen   Tablet .. 650 milliGRAM(s) Oral every 6 hours PRN Mild Pain (1 - 3)  propofol Infusion 10 MICROgram(s)/kG/Min (5.77 mL/Hr) IV Continuous <Continuous>    ==================== RESPIRATORY======================  Respiratory status required full ventilatory support, close monitoring of respiratory rate and breathing pattern, the following of ABG’s with A-line monitoring, continuous pulse oximetry monitoring. Patient has required increased Fi02 and Peep with increased lung stiffness and airway pressures. Possibly post transfusion of multiple blood products with increased lung water.    Mechanical Ventilation:  Mode: AC/ CMV (Assist Control/ Continuous Mandatory Ventilation)  RR (machine): 16  TV (machine): 550  FiO2: 60  PEEP: 10  ITime: 1  MAP: 16  PIP: 35      ====================CARDIOVASCULAR==================  Patient admitted with SOB and found to have CAD and severe MR, now recovering s/p MVR/CABG. Patient maintained on an IV Dobutamine infusion for right ventricular systolic heart failure, weaned from 5 to 3 ug/kg/min this evening. Norepinephrine and vasopressin weaned to off. Invasive hemodynamic monitoring with a central venous catheter & an A-line were required for the continuous central venous and MAP/BP monitoring to ensure adequate cardiovascular support. Patient has a Medtronic permanent pacemaker with a back up rate of 60. ASA continued for graft occlusion-thromboembolism prophylaxis.     aspirin  chewable 81 milliGRAM(s) Oral daily  DOBUTamine Infusion 3 MICROgram(s)/kG/Min (8.66 mL/Hr) IV Continuous <Continuous>    ===================HEMATOLOGIC/ONC ===================  Anemia due to acute blood loss and thrombocytopenia status post 4 units of packed red blood cells, 4 platelets, 3 Cryo, 2 FFP, 2 Amicar. Monitor hemoglobin/hematocrit levels and platelets.    ===================== RENAL =========================  Optimize renal perfusion with avoidance of intravascular volume depletion and continued monitoring of urine output, fluid balance, BUN/Creatinine. Due to positive fluid balance and hypoxia in part due to pulmonary congestion, IV Lasix ordered x2 without strong response and an IV Lasix infusion was subsequently ordered.    ==================== GASTROINTESTINAL===================  NPO after recent procedure. Continue Protonix for stress ulcer prophylaxis. Bowel regimen with Miralax.     pantoprazole  Injectable 40 milliGRAM(s) IV Push daily  polyethylene glycol 3350 17 Gram(s) Oral daily    =======================    ENDOCRINE  =====================  Metabolic stability, stress hyperglycemia required an IV regular Insulin drip while following serial glucose levels to help achieve and maintain euglycemia. Continue Synthroid for Hypothyroidism.    insulin regular Infusion 3 Unit(s)/Hr (3 mL/Hr) IV Continuous <Continuous>  levothyroxine Injectable 68 MICROGram(s) IV Push at bedtime    ========================INFECTIOUS DISEASE================  Afebrile, white blood cells within normal limits. Continue Cefuroxime for perioperative antibiotic coverage.    cefuroxime  IVPB 1500 milliGRAM(s) IV Intermittent every 8 hours      Patient requires continuous monitoring with bedside rhythm monitoring, pulse oximetry monitoring, and continuous central venous and arterial pressure monitoring; and intermittent blood gas analysis.  Care plan discussed with ICU care team.    Patient remained critical, at risk for life threatening decompensation.   I have spent 40 minutes providing acute care with multiple re-evaluations throughout the evening.     By signing my name below, I, Bhumika Mccoy , attest that this documentation has been prepared under the direction and in the presence of Haily Woods MD   Electronically signed: Alexandra Sanford, 08-31-21 @ 19:29    I, Haily Woods, personally performed the services described in this documentation. All medical record entries made by the scribe were at my direction and in my presence. I have reviewed the chart and agree that the record reflects my personal performance and is accurate and complete  Electronically signed: Haily Woods MD 08-31-21 @ 19:29

## 2021-09-01 LAB
ALBUMIN SERPL ELPH-MCNC: 3.7 G/DL — SIGNIFICANT CHANGE UP (ref 3.3–5)
ALP SERPL-CCNC: 56 U/L — SIGNIFICANT CHANGE UP (ref 40–120)
ALT FLD-CCNC: 11 U/L — SIGNIFICANT CHANGE UP (ref 10–45)
ANION GAP SERPL CALC-SCNC: 16 MMOL/L — SIGNIFICANT CHANGE UP (ref 5–17)
AST SERPL-CCNC: 38 U/L — SIGNIFICANT CHANGE UP (ref 10–40)
BILIRUB SERPL-MCNC: 1 MG/DL — SIGNIFICANT CHANGE UP (ref 0.2–1.2)
BUN SERPL-MCNC: 39 MG/DL — HIGH (ref 7–23)
CALCIUM SERPL-MCNC: 8.7 MG/DL — SIGNIFICANT CHANGE UP (ref 8.4–10.5)
CHLORIDE SERPL-SCNC: 106 MMOL/L — SIGNIFICANT CHANGE UP (ref 96–108)
CO2 SERPL-SCNC: 20 MMOL/L — LOW (ref 22–31)
CREAT SERPL-MCNC: 1.35 MG/DL — HIGH (ref 0.5–1.3)
GAS PNL BLDA: SIGNIFICANT CHANGE UP
GLUCOSE BLDC GLUCOMTR-MCNC: 111 MG/DL — HIGH (ref 70–99)
GLUCOSE BLDC GLUCOMTR-MCNC: 114 MG/DL — HIGH (ref 70–99)
GLUCOSE BLDC GLUCOMTR-MCNC: 117 MG/DL — HIGH (ref 70–99)
GLUCOSE BLDC GLUCOMTR-MCNC: 120 MG/DL — HIGH (ref 70–99)
GLUCOSE BLDC GLUCOMTR-MCNC: 121 MG/DL — HIGH (ref 70–99)
GLUCOSE BLDC GLUCOMTR-MCNC: 126 MG/DL — HIGH (ref 70–99)
GLUCOSE BLDC GLUCOMTR-MCNC: 126 MG/DL — HIGH (ref 70–99)
GLUCOSE BLDC GLUCOMTR-MCNC: 127 MG/DL — HIGH (ref 70–99)
GLUCOSE BLDC GLUCOMTR-MCNC: 128 MG/DL — HIGH (ref 70–99)
GLUCOSE BLDC GLUCOMTR-MCNC: 129 MG/DL — HIGH (ref 70–99)
GLUCOSE BLDC GLUCOMTR-MCNC: 130 MG/DL — HIGH (ref 70–99)
GLUCOSE BLDC GLUCOMTR-MCNC: 131 MG/DL — HIGH (ref 70–99)
GLUCOSE BLDC GLUCOMTR-MCNC: 131 MG/DL — HIGH (ref 70–99)
GLUCOSE BLDC GLUCOMTR-MCNC: 137 MG/DL — HIGH (ref 70–99)
GLUCOSE BLDC GLUCOMTR-MCNC: 138 MG/DL — HIGH (ref 70–99)
GLUCOSE BLDC GLUCOMTR-MCNC: 142 MG/DL — HIGH (ref 70–99)
GLUCOSE BLDC GLUCOMTR-MCNC: 145 MG/DL — HIGH (ref 70–99)
GLUCOSE BLDC GLUCOMTR-MCNC: 154 MG/DL — HIGH (ref 70–99)
GLUCOSE BLDC GLUCOMTR-MCNC: 156 MG/DL — HIGH (ref 70–99)
GLUCOSE BLDC GLUCOMTR-MCNC: 159 MG/DL — HIGH (ref 70–99)
GLUCOSE SERPL-MCNC: 157 MG/DL — HIGH (ref 70–99)
HCT VFR BLD CALC: 26.1 % — LOW (ref 39–50)
HGB BLD-MCNC: 8.7 G/DL — LOW (ref 13–17)
MAGNESIUM SERPL-MCNC: 2.4 MG/DL — SIGNIFICANT CHANGE UP (ref 1.6–2.6)
MCHC RBC-ENTMCNC: 29.6 PG — SIGNIFICANT CHANGE UP (ref 27–34)
MCHC RBC-ENTMCNC: 33.3 GM/DL — SIGNIFICANT CHANGE UP (ref 32–36)
MCV RBC AUTO: 88.8 FL — SIGNIFICANT CHANGE UP (ref 80–100)
NRBC # BLD: 0 /100 WBCS — SIGNIFICANT CHANGE UP (ref 0–0)
PHOSPHATE SERPL-MCNC: 4.4 MG/DL — SIGNIFICANT CHANGE UP (ref 2.5–4.5)
PLATELET # BLD AUTO: 117 K/UL — LOW (ref 150–400)
POTASSIUM SERPL-MCNC: 4 MMOL/L — SIGNIFICANT CHANGE UP (ref 3.5–5.3)
POTASSIUM SERPL-SCNC: 4 MMOL/L — SIGNIFICANT CHANGE UP (ref 3.5–5.3)
PROT SERPL-MCNC: 5.5 G/DL — LOW (ref 6–8.3)
RBC # BLD: 2.94 M/UL — LOW (ref 4.2–5.8)
RBC # FLD: 15.8 % — HIGH (ref 10.3–14.5)
SODIUM SERPL-SCNC: 142 MMOL/L — SIGNIFICANT CHANGE UP (ref 135–145)
WBC # BLD: 12.31 K/UL — HIGH (ref 3.8–10.5)
WBC # FLD AUTO: 12.31 K/UL — HIGH (ref 3.8–10.5)

## 2021-09-01 PROCEDURE — 93288 INTERROG EVL PM/LDLS PM IP: CPT | Mod: 26

## 2021-09-01 PROCEDURE — 93010 ELECTROCARDIOGRAM REPORT: CPT

## 2021-09-01 PROCEDURE — 71045 X-RAY EXAM CHEST 1 VIEW: CPT | Mod: 26

## 2021-09-01 PROCEDURE — 99291 CRITICAL CARE FIRST HOUR: CPT

## 2021-09-01 RX ORDER — HYDROMORPHONE HYDROCHLORIDE 2 MG/ML
0.5 INJECTION INTRAMUSCULAR; INTRAVENOUS; SUBCUTANEOUS ONCE
Refills: 0 | Status: DISCONTINUED | OUTPATIENT
Start: 2021-09-01 | End: 2021-09-01

## 2021-09-01 RX ORDER — POTASSIUM CHLORIDE 20 MEQ
10 PACKET (EA) ORAL
Refills: 0 | Status: COMPLETED | OUTPATIENT
Start: 2021-09-01 | End: 2021-09-01

## 2021-09-01 RX ORDER — ACETAMINOPHEN 500 MG
1000 TABLET ORAL ONCE
Refills: 0 | Status: COMPLETED | OUTPATIENT
Start: 2021-09-01 | End: 2021-09-01

## 2021-09-01 RX ORDER — POTASSIUM CHLORIDE 20 MEQ
10 PACKET (EA) ORAL ONCE
Refills: 0 | Status: COMPLETED | OUTPATIENT
Start: 2021-09-01 | End: 2021-09-01

## 2021-09-01 RX ORDER — ALBUMIN HUMAN 25 %
250 VIAL (ML) INTRAVENOUS ONCE
Refills: 0 | Status: COMPLETED | OUTPATIENT
Start: 2021-09-01 | End: 2021-09-01

## 2021-09-01 RX ORDER — FUROSEMIDE 40 MG
5 TABLET ORAL
Qty: 500 | Refills: 0 | Status: DISCONTINUED | OUTPATIENT
Start: 2021-09-01 | End: 2021-09-01

## 2021-09-01 RX ORDER — HYDROCORTISONE 1 %
1 OINTMENT (GRAM) TOPICAL DAILY
Refills: 0 | Status: DISCONTINUED | OUTPATIENT
Start: 2021-09-01 | End: 2021-09-16

## 2021-09-01 RX ORDER — ENOXAPARIN SODIUM 100 MG/ML
40 INJECTION SUBCUTANEOUS DAILY
Refills: 0 | Status: DISCONTINUED | OUTPATIENT
Start: 2021-09-01 | End: 2021-09-08

## 2021-09-01 RX ORDER — DEXMEDETOMIDINE HYDROCHLORIDE IN 0.9% SODIUM CHLORIDE 4 UG/ML
0.5 INJECTION INTRAVENOUS
Qty: 200 | Refills: 0 | Status: DISCONTINUED | OUTPATIENT
Start: 2021-09-01 | End: 2021-09-02

## 2021-09-01 RX ORDER — VASOPRESSIN 20 [USP'U]/ML
0.07 INJECTION INTRAVENOUS
Qty: 50 | Refills: 0 | Status: DISCONTINUED | OUTPATIENT
Start: 2021-09-01 | End: 2021-09-02

## 2021-09-01 RX ORDER — AMIODARONE HYDROCHLORIDE 400 MG/1
150 TABLET ORAL ONCE
Refills: 0 | Status: COMPLETED | OUTPATIENT
Start: 2021-09-01 | End: 2021-09-01

## 2021-09-01 RX ADMIN — Medication 50 MILLIEQUIVALENT(S): at 14:00

## 2021-09-01 RX ADMIN — HYDROMORPHONE HYDROCHLORIDE 0.5 MILLIGRAM(S): 2 INJECTION INTRAMUSCULAR; INTRAVENOUS; SUBCUTANEOUS at 03:32

## 2021-09-01 RX ADMIN — HYDROMORPHONE HYDROCHLORIDE 0.5 MILLIGRAM(S): 2 INJECTION INTRAMUSCULAR; INTRAVENOUS; SUBCUTANEOUS at 15:00

## 2021-09-01 RX ADMIN — HYDROMORPHONE HYDROCHLORIDE 0.5 MILLIGRAM(S): 2 INJECTION INTRAMUSCULAR; INTRAVENOUS; SUBCUTANEOUS at 04:00

## 2021-09-01 RX ADMIN — Medication 50 MILLIEQUIVALENT(S): at 16:36

## 2021-09-01 RX ADMIN — CHLORHEXIDINE GLUCONATE 1 APPLICATION(S): 213 SOLUTION TOPICAL at 05:15

## 2021-09-01 RX ADMIN — MUPIROCIN 1 APPLICATION(S): 20 OINTMENT TOPICAL at 05:15

## 2021-09-01 RX ADMIN — Medication 400 MILLIGRAM(S): at 17:35

## 2021-09-01 RX ADMIN — INSULIN HUMAN 3 UNIT(S)/HR: 100 INJECTION, SOLUTION SUBCUTANEOUS at 13:07

## 2021-09-01 RX ADMIN — ENOXAPARIN SODIUM 40 MILLIGRAM(S): 100 INJECTION SUBCUTANEOUS at 17:34

## 2021-09-01 RX ADMIN — Medication 1000 MILLIGRAM(S): at 18:05

## 2021-09-01 RX ADMIN — Medication 81 MILLIGRAM(S): at 13:06

## 2021-09-01 RX ADMIN — Medication 2.5 MG/HR: at 16:36

## 2021-09-01 RX ADMIN — Medication 5 MG/HR: at 13:07

## 2021-09-01 RX ADMIN — CHLORHEXIDINE GLUCONATE 15 MILLILITER(S): 213 SOLUTION TOPICAL at 05:15

## 2021-09-01 RX ADMIN — Medication 125 MILLILITER(S): at 21:06

## 2021-09-01 RX ADMIN — HYDROMORPHONE HYDROCHLORIDE 0.5 MILLIGRAM(S): 2 INJECTION INTRAMUSCULAR; INTRAVENOUS; SUBCUTANEOUS at 14:45

## 2021-09-01 RX ADMIN — Medication 100 MILLIGRAM(S): at 05:23

## 2021-09-01 RX ADMIN — Medication 1 APPLICATION(S): at 17:35

## 2021-09-01 RX ADMIN — PANTOPRAZOLE SODIUM 40 MILLIGRAM(S): 20 TABLET, DELAYED RELEASE ORAL at 13:06

## 2021-09-01 RX ADMIN — Medication 50 MILLIEQUIVALENT(S): at 16:00

## 2021-09-01 RX ADMIN — CHLORHEXIDINE GLUCONATE 15 MILLILITER(S): 213 SOLUTION TOPICAL at 17:36

## 2021-09-01 RX ADMIN — DEXMEDETOMIDINE HYDROCHLORIDE IN 0.9% SODIUM CHLORIDE 12 MICROGRAM(S)/KG/HR: 4 INJECTION INTRAVENOUS at 13:08

## 2021-09-01 RX ADMIN — Medication 68 MICROGRAM(S): at 21:47

## 2021-09-01 RX ADMIN — Medication 1 APPLICATION(S): at 13:19

## 2021-09-01 RX ADMIN — MUPIROCIN 1 APPLICATION(S): 20 OINTMENT TOPICAL at 17:35

## 2021-09-01 NOTE — PROGRESS NOTE ADULT - SUBJECTIVE AND OBJECTIVE BOX
CARDIOLOGY     PROGRESS  NOTE   ________________________________________________    CHIEF COMPLAINT:Patient is a 76y old  Male who presents with a chief complaint of sob (01 Sep 2021 07:05)    	  REVIEW OF SYSTEMS:  CONSTITUTIONAL: No fever, weight loss, or fatigue  EYES: No eye pain, visual disturbances, or discharge  ENT:  No difficulty hearing, tinnitus, vertigo; No sinus or throat pain  NECK: No pain or stiffness  RESPIRATORY: No cough, wheezing, chills or hemoptysis; No Shortness of Breath  CARDIOVASCULAR: No chest pain, palpitations, passing out, dizziness, or leg swelling  GASTROINTESTINAL: No abdominal or epigastric pain. No nausea, vomiting, or hematemesis; No diarrhea or constipation. No melena or hematochezia.  GENITOURINARY: No dysuria, frequency, hematuria, or incontinence  NEUROLOGICAL: No headaches, memory loss, loss of strength, numbness, or tremors  SKIN: No itching, burning, rashes, or lesions   LYMPH Nodes: No enlarged glands  ENDOCRINE: No heat or cold intolerance; No hair loss  MUSCULOSKELETAL: No joint pain or swelling; No muscle, back, or extremity pain  PSYCHIATRIC: No depression, anxiety, mood swings, or difficulty sleeping  HEME/LYMPH: No easy bruising, or bleeding gums  ALLERGY AND IMMUNOLOGIC: No hives or eczema	    [ ] All others negative	  [ ] Unable to obtain    PHYSICAL EXAM:  T(C): 37 (09-01-21 @ 08:00), Max: 37.2 (09-01-21 @ 03:00)  HR: 60 (09-01-21 @ 08:00) (59 - 133)  BP: --  RR: 16 (09-01-21 @ 08:00) (3 - 35)  SpO2: 95% (09-01-21 @ 08:00) (89% - 100%)  Wt(kg): --  I&O's Summary    31 Aug 2021 07:01  -  01 Sep 2021 07:00  --------------------------------------------------------  IN: 1385.3 mL / OUT: 2655 mL / NET: -1269.7 mL    01 Sep 2021 07:01  -  01 Sep 2021 08:51  --------------------------------------------------------  IN: 25.7 mL / OUT: 145 mL / NET: -119.3 mL  Mode: AC/ CMV (Assist Control/ Continuous Mandatory Ventilation)  RR (machine): 16  TV (machine): 550  FiO2: 50  PEEP: 10  ITime: 1  MAP: 16  PIP: 29  sedated on vent.  Appearance: intubated  HEENT:   Normal oral mucosa, PERRL, EOMI	  Lymphatic: No lymphadenopathy  Cardiovascular: Normal S1 S2, No JVD, + murmurs, No edema  Respiratory: Lungs clear to auscultation	  Gastrointestinal:  Soft, Non-tender, + BS	  Skin: No rashes, No ecchymoses, No cyanosis	  Extremities: Normal range of motion, No clubbing, cyanosis or edema  Vascular: Peripheral pulses palpable 2+ bilaterally    MEDICATIONS  (STANDING):  aMIOdarone IVPB 150 milliGRAM(s) IV Intermittent once  aspirin  chewable 81 milliGRAM(s) Oral daily  chlorhexidine 0.12% Liquid 15 milliLiter(s) Oral Mucosa every 12 hours  chlorhexidine 2% Cloths 1 Application(s) Topical <User Schedule>  DOBUTamine Infusion 1.5 MICROgram(s)/kG/Min (4.33 mL/Hr) IV Continuous <Continuous>  furosemide Infusion 10 mG/Hr (5 mL/Hr) IV Continuous <Continuous>  insulin regular Infusion 3 Unit(s)/Hr (3 mL/Hr) IV Continuous <Continuous>  levothyroxine Injectable 68 MICROGram(s) IV Push at bedtime  mupirocin 2% Ointment 1 Application(s) Topical two times a day  pantoprazole  Injectable 40 milliGRAM(s) IV Push daily  polyethylene glycol 3350 17 Gram(s) Oral daily  propofol Infusion 10 MICROgram(s)/kG/Min (5.77 mL/Hr) IV Continuous <Continuous>  sodium chloride 0.9%. 1000 milliLiter(s) (10 mL/Hr) IV Continuous <Continuous>      TELEMETRY: 	    ECG:  	  RADIOLOGY:  OTHER: 	  	  LABS:	 	    CARDIAC MARKERS:  CARDIAC MARKERS ( 30 Aug 2021 20:43 )  x     / x     / 271 U/L / x     / 43.5 ng/mL                                8.7    12.31 )-----------( 117      ( 01 Sep 2021 00:26 )             26.1     09-01    142  |  106  |  39<H>  ----------------------------<  157<H>  4.0   |  20<L>  |  1.35<H>    Ca    8.7      01 Sep 2021 00:26  Phos  4.4     09-01  Mg     2.4     09-01    TPro  5.5<L>  /  Alb  3.7  /  TBili  1.0  /  DBili  x   /  AST  38  /  ALT  11  /  AlkPhos  56  09-01    proBNP: Serum Pro-Brain Natriuretic Peptide: 1881 pg/mL (08-18 @ 16:10)    Lipid Profile:   HgA1c:   TSH: Thyroid Stimulating Hormone, Serum: 5.02 uIU/mL (08-25 @ 22:25)    PT/INR - ( 31 Aug 2021 03:36 )   PT: 14.4 sec;   INR: 1.21 ratio         PTT - ( 31 Aug 2021 03:36 )  PTT:32.0 sec  < from: Xray Chest 1 View- PORTABLE-Urgent (Xray Chest 1 View- PORTABLE-Urgent .) (08.31.21 @ 12:34) >  The heart is enlarged. Left lower lobe pneumonia and/or atelectasis. The right lung appears to be clear. All life supporting devices are in good position and unchanged when compared to previous study done the same date at 5:45 AM. Status post mitral valve replacement. Status post sternotomy.    Called by CT surgery overnight team, asked to adjust back-up rate to 60 bpm from previous 55 bpm.    Briefly, 75yo M h/o hypothyroid, HTN, CAD (s/p PCI 2007), afib, PPM (Medtronic 01/2012), originally p/w SOB, now POD#0 s/p CABG and MVR, currently in CTICU for management. Per primary team after surgery pt was HR 55, dobutamine was started, HR then increased to 90s. Primary team asking to change backup rate to 60 bpm.    Back up rate was changed to 60 bpm from 55 bpm. Report prior to and after change was printed and placed in pt's chart.    < from: 12 Lead ECG (08.21.21 @ 17:53) >  WHEN COMPARED WITH ECG OF 21-AUG-2021 17:52, (UNCONFIRMED)  SIGNIFICANT CHANGES HAVE OCCURRED  PVC not seen      Assessment and plan  ---------------------------  s/p CABG 2 vessels and MVR/ bio  post op bleeding, s/p blood transfusion  sedated on vent still on dobutamine  hgb stable  started on amio sec to a.fib  need to maintain NSR   ppm noted may need to increase hr  to 80 for now  extubation/ vent as per ICU

## 2021-09-01 NOTE — PROGRESS NOTE ADULT - SUBJECTIVE AND OBJECTIVE BOX
INTERVAL HPI/OVERNIGHT EVENTS:  s/p CABG/MVR, now intubated, events noted  hgb stale      MEDICATIONS  (PRN):      Allergies    alfuzosin (Hives)  levofloxacin (Hives)  Myrbetriq (Hives)  tamsulosin (Hives)    Intolerances      ROS   unable to obtain         PHYSICAL EXAM:   Vital Signs:  Vital Signs Last 24 Hrs  T(C): 37.1 (29 Aug 2021 04:21), Max: 37.1 (29 Aug 2021 04:21)  T(F): 98.7 (29 Aug 2021 04:21), Max: 98.7 (29 Aug 2021 04:21)  HR: 55 (29 Aug 2021 04:21) (55 - 78)  BP: 116/61 (29 Aug 2021 04:21) (116/61 - 146/63)  BP(mean): --  RR: 18 (29 Aug 2021 04:21) (18 - 18)  SpO2: 93% (29 Aug 2021 04:21) (93% - 96%)  Daily     Daily Weight in k.2 (29 Aug 2021 07:30)I&O's Summary    28 Aug 2021 07:  -  29 Aug 2021 07:00  --------------------------------------------------------  IN: 1110 mL / OUT: 5 mL / NET: -915 mL    29 Aug 2021 07:01  -  29 Aug 2021 10:27  --------------------------------------------------------  IN: 240 mL / OUT: 800 mL / NET: -560 mL        GENERAL:  Appears stated age, well-groomed, well-nourished, no distress  HEENT:  NC/AT,  conjunctivae clear and pink, no thyromegaly, nodules, adenopathy, no JVD, sclera -anicteric  CHEST:  Full & symmetric excursion, no increased effort, breath sounds clear  HEART:  Regular rhythm, S1, S2, no murmur/rub/S3/S4, no abdominal bruit, no edema  ABDOMEN:  Soft, non-tender, non-distended, normoactive bowel sounds,  no masses ,no hepato-splenomegaly, no signs of chronic liver disease  EXTEREMITIES:  no cyanosis,clubbing or edema  SKIN:  No rash/erythema/ecchymoses/petechiae/wounds/abscess/warm/dry  NEURO:  Alert, oriented, no asterixis, no tremor, no encephalopathy      LABS:                        11.5   5.65  )-----------( 138      ( 29 Aug 2021 06:30 )             36.1     08-    139  |  105  |  44<H>  ----------------------------<  113<H>  4.5   |  21<L>  |  1.06    Ca    9.8      29 Aug 2021 06:30          amylase   lipase  RADIOLOGY & ADDITIONAL TESTS:

## 2021-09-01 NOTE — PROCEDURE NOTE - ADDITIONAL PROCEDURE DETAILS
1. Interrogation today revealed battery reached RRT (Recommended Replacement Time) on 8/17/21 at 2.81V  2. Not PM dependent, currently NSR 60's, Vpaced 0.2%, Apaced 10.6%  3. Lead impedances stable; Sensing stable  4. Pacing rate increased to 80 ppm per CTU Team  5. Battery RRT d/w Dr Christensen with plan for PM generator change prior to discharge    Hilaria Edwards, ANP-C  #[id number]
1) Indication for interrogation: Check for battery depletion and possibly going for valve surgery  2) Presenting rhythm: SR with intermittent V pacing at 65 bpm  3) Measured data WNL, NL PM function, Pt is not PM dependent. A pace 45%, V pace 2.3%  4) AT/AF burden <0.1%  5) Stored data revealed few brief NSVT episodes. Also few SVT episodes with longest episode lasting 2 mins.   6) Changes made: pacing mode changed from VVI at 65 bpm to MVP mode (AAI<=>DDD) at  bpm  7) Patient's pacemaker went to ALBA on 8/17/21, according to [employer] manufacture, there will be approximately 3 months left on battery since the time it reached ALBA on 8/17/21.  8) Discussed with Dr. Amparo Bear, NP-C  [phone]

## 2021-09-01 NOTE — PROGRESS NOTE ADULT - SUBJECTIVE AND OBJECTIVE BOX
CRITICAL CARE ATTENDING - CTICU    MEDICATIONS  (STANDING):  aMIOdarone IVPB 150 milliGRAM(s) IV Intermittent once  aspirin  chewable 81 milliGRAM(s) Oral daily  chlorhexidine 0.12% Liquid 15 milliLiter(s) Oral Mucosa every 12 hours  chlorhexidine 2% Cloths 1 Application(s) Topical <User Schedule>  DOBUTamine Infusion 1.5 MICROgram(s)/kG/Min (4.33 mL/Hr) IV Continuous <Continuous>  furosemide Infusion 15 mG/Hr (7.5 mL/Hr) IV Continuous <Continuous>  insulin regular Infusion 3 Unit(s)/Hr (3 mL/Hr) IV Continuous <Continuous>  levothyroxine Injectable 68 MICROGram(s) IV Push at bedtime  mupirocin 2% Ointment 1 Application(s) Topical two times a day  pantoprazole  Injectable 40 milliGRAM(s) IV Push daily  polyethylene glycol 3350 17 Gram(s) Oral daily  propofol Infusion 10 MICROgram(s)/kG/Min (5.77 mL/Hr) IV Continuous <Continuous>  sodium chloride 0.9%. 1000 milliLiter(s) (10 mL/Hr) IV Continuous <Continuous>                                    8.7    12.31 )-----------( 117      ( 01 Sep 2021 00:26 )             26.1       09-    142  |  106  |  39<H>  ----------------------------<  157<H>  4.0   |  20<L>  |  1.35<H>    Ca    8.7      01 Sep 2021 00:26  Phos  4.4     -  Mg     2.4         TPro  5.5<L>  /  Alb  3.7  /  TBili  1.0  /  DBili  x   /  AST  38  /  ALT  11  /  AlkPhos  56  -      PT/INR - ( 31 Aug 2021 03:36 )   PT: 14.4 sec;   INR: 1.21 ratio         PTT - ( 31 Aug 2021 03:36 )  PTT:32.0 sec    Mode: AC/ CMV (Assist Control/ Continuous Mandatory Ventilation)  RR (machine): 16  TV (machine): 550  FiO2: 50  PEEP: 10  ITime: 1  MAP: 16  PIP: 29      Daily     Daily Weight in k.8 (01 Sep 2021 00:00)      08-31 @ 07:01  -   @ 07:00  --------------------------------------------------------  IN: 1385.3 mL / OUT: 2655 mL / NET: -1269.7 mL        Critically Ill patient  : [ ] preoperative ,   [ ] post operative    Requires :  [x] Arterial Line   [x] Central Line  [ ] PA catheter  [ ] IABP  [ ] ECMO  [ ] LVAD  [x] Ventilator  [x] pacemaker- PPM [ ] Impella.                      [x] ABG's     [x] Pulse Oxymetry Monitoring  Bedside evaluation , monitoring , treatment of hemodynamics , fluids , IVP/ IVCD meds.        Diagnosis:   POD 2 - MVR(T) / C2L     Hypotension    Hypovolemia     Post Op Bleeding    Anemia - acute blood loss    s/p transfusion of PRBC's / Blood Products    Chest tube drainage     Requires chest PT, pulmonary toilet, ambu bagging, suctioning to maintain SaO2,  patent airway and treat atelectasis.     Ventilator Management:  [ x]AC-rest    [x ]CPAP-PS Wean    [ ]Trach Collar     [ ]Extubate    [ x] T-Piece  [x]peep>5     CHF- acute [ x]   chronic [ ]    systolic [ x]   diastolic [ ]          - Echo- EF -       nml      [x ] RV dysfunction          - Cxr-cardiomegally, edema          - Clinical-  [x ]inotropes   [x ]pressors   [x]diuresis   [ ]IABP   [ ]ECMO   [ ]LVAD   [x ] Ventilator      Hemodynamic lability,  instability. Requires IVCD [x ] vasopressors [x ] inotropes  [ ] vasodilator  [ ]IVSS fluid  to maintain MAP, perfusion, C.I.     IVCD insulin     Hypotension     Thrombocytopenia     Prerenal azotemia     Requires bedside physical therapy, mobilization and total correction care.             I, Tony Israel, personally performed the services described in this documentation. All medical record entries made by the scribe were at my direction and in my presence. I have reviewed the chart and agree that the record reflects my personal performance and is accurate and complete.   Tony Israel MD.       By signing my name below, I, Monae Catalan, attest that this documentation has been prepared under the direction and in the presence of Tony Israel MD.   Electronically Signed: Monae Catalan Scribe 21 @ 07:06        Discussed with CT surgeon, Physician Assistant - Nurse Practitioner- Critical care medicine team.   Dicussed at  AM / PM rounds.   Chart, labs , films reviewed.    Cumulative Critical Care Time Given Today:  CRITICAL CARE ATTENDING - CTICU    MEDICATIONS  (STANDING):  aMIOdarone IVPB 150 milliGRAM(s) IV Intermittent once  aspirin  chewable 81 milliGRAM(s) Oral daily  chlorhexidine 0.12% Liquid 15 milliLiter(s) Oral Mucosa every 12 hours  chlorhexidine 2% Cloths 1 Application(s) Topical <User Schedule>  DOBUTamine Infusion 1.5 MICROgram(s)/kG/Min (4.33 mL/Hr) IV Continuous <Continuous>  furosemide Infusion 15 mG/Hr (7.5 mL/Hr) IV Continuous <Continuous>  insulin regular Infusion 3 Unit(s)/Hr (3 mL/Hr) IV Continuous <Continuous>  levothyroxine Injectable 68 MICROGram(s) IV Push at bedtime  mupirocin 2% Ointment 1 Application(s) Topical two times a day  pantoprazole  Injectable 40 milliGRAM(s) IV Push daily  polyethylene glycol 3350 17 Gram(s) Oral daily  propofol Infusion 10 MICROgram(s)/kG/Min (5.77 mL/Hr) IV Continuous <Continuous>  sodium chloride 0.9%. 1000 milliLiter(s) (10 mL/Hr) IV Continuous <Continuous>                                    8.7    12.31 )-----------( 117      ( 01 Sep 2021 00:26 )             26.1       09-    142  |  106  |  39<H>  ----------------------------<  157<H>  4.0   |  20<L>  |  1.35<H>    Ca    8.7      01 Sep 2021 00:26  Phos  4.4     -  Mg     2.4         TPro  5.5<L>  /  Alb  3.7  /  TBili  1.0  /  DBili  x   /  AST  38  /  ALT  11  /  AlkPhos  56  -      PT/INR - ( 31 Aug 2021 03:36 )   PT: 14.4 sec;   INR: 1.21 ratio         PTT - ( 31 Aug 2021 03:36 )  PTT:32.0 sec    Mode: AC/ CMV (Assist Control/ Continuous Mandatory Ventilation)  RR (machine): 16  TV (machine): 550  FiO2: 50  PEEP: 10  ITime: 1  MAP: 16  PIP: 29      Daily     Daily Weight in k.8 (01 Sep 2021 00:00)      08-31 @ 07:01  -   @ 07:00  --------------------------------------------------------  IN: 1385.3 mL / OUT: 2655 mL / NET: -1269.7 mL        Critically Ill patient  : [ ] preoperative ,   [x ] post operative    Requires :  [x] Arterial Line   [x] Central Line  [ ] PA catheter  [ ] IABP  [ ] ECMO  [ ] LVAD  [x] Ventilator  [x] pacemaker- PPM [ ] Impella.                      [x] ABG's     [x] Pulse Oxymetry Monitoring  Bedside evaluation , monitoring , treatment of hemodynamics , fluids , IVP/ IVCD meds.        Diagnosis:     POD 2 - MVR(T) / C2L     Hypotension    Post Op Bleeding    Anemia - acute blood loss    s/p transfusion of PRBC's / Blood Products    Fluid  overload     Chest tube drainage     Requires chest PT, pulmonary toilet, ambu bagging, suctioning to maintain SaO2,  patent airway and treat atelectasis.     Ventilator Management:  [ x]AC-rest    [x ]CPAP-PS Wean    [ ]Trach Collar     [ ]Extubate    [ x] T-Piece  [x]peep>5   (+10)    CHF- acute [ x]   chronic [ ]    systolic [ x]   diastolic [ ]          - Echo- EF -       nml      [x ] RV dysfunction          - Cxr-cardiomegally, edema          - Clinical-  [x ]inotropes   [x ]pressors   [x]diuresis   [ ]IABP   [ ]ECMO   [ ]LVAD   [x ] Ventilator      Hemodynamic lability,  instability. Requires IVCD [x ] vasopressors [x ] inotropes  [ ] vasodilator  [ ]IVSS fluid  to maintain MAP, perfusion, C.I.     IVCD insulin     IVCD Lasix     Hypotension     Thrombocytopenia     Prerenal azotemia     Requires bedside physical therapy, mobilization and total long-term care.     Hypoxia / 10 peep / 60% FiO2            ITony, personally performed the services described in this documentation. All medical record entries made by the scribe were at my direction and in my presence. I have reviewed the chart and agree that the record reflects my personal performance and is accurate and complete.   Tony Israel MD.       By signing my name below, I, Monae Catalan, attest that this documentation has been prepared under the direction and in the presence of Tony Israel MD.   Electronically Signed: Monae Catalan Scribe 21 @ 07:06        Discussed with CT surgeon, Physician Assistant - Nurse Practitioner- Critical care medicine team.   Dicussed at  AM / PM rounds.   Chart, labs , films reviewed.    Cumulative Critical Care Time Given Today:  45 min

## 2021-09-01 NOTE — PROCEDURE NOTE - INTERROGATION NOTE: COMMENTS
Battery longevity: generator has reached elective replacement indicator (ALBA) on 8/17/21
PPM Interrogation for Rate Adjustment

## 2021-09-02 LAB
ALBUMIN SERPL ELPH-MCNC: 3.9 G/DL — SIGNIFICANT CHANGE UP (ref 3.3–5)
ALP SERPL-CCNC: 57 U/L — SIGNIFICANT CHANGE UP (ref 40–120)
ALT FLD-CCNC: 9 U/L — LOW (ref 10–45)
ANION GAP SERPL CALC-SCNC: 11 MMOL/L — SIGNIFICANT CHANGE UP (ref 5–17)
AST SERPL-CCNC: 23 U/L — SIGNIFICANT CHANGE UP (ref 10–40)
BASE EXCESS BLDA CALC-SCNC: 3.2 MMOL/L — HIGH (ref -2–3)
BASE EXCESS BLDA CALC-SCNC: 4.3 MMOL/L — HIGH (ref -2–3)
BILIRUB SERPL-MCNC: 0.7 MG/DL — SIGNIFICANT CHANGE UP (ref 0.2–1.2)
BLD GP AB SCN SERPL QL: NEGATIVE — SIGNIFICANT CHANGE UP
BUN SERPL-MCNC: 50 MG/DL — HIGH (ref 7–23)
CALCIUM SERPL-MCNC: 8.7 MG/DL — SIGNIFICANT CHANGE UP (ref 8.4–10.5)
CHLORIDE SERPL-SCNC: 107 MMOL/L — SIGNIFICANT CHANGE UP (ref 96–108)
CO2 BLDA-SCNC: 28 MMOL/L — HIGH (ref 19–24)
CO2 BLDA-SCNC: 28 MMOL/L — HIGH (ref 19–24)
CO2 SERPL-SCNC: 24 MMOL/L — SIGNIFICANT CHANGE UP (ref 22–31)
CREAT SERPL-MCNC: 1.3 MG/DL — SIGNIFICANT CHANGE UP (ref 0.5–1.3)
GAS PNL BLDA: SIGNIFICANT CHANGE UP
GLUCOSE BLDC GLUCOMTR-MCNC: 101 MG/DL — HIGH (ref 70–99)
GLUCOSE BLDC GLUCOMTR-MCNC: 104 MG/DL — HIGH (ref 70–99)
GLUCOSE BLDC GLUCOMTR-MCNC: 107 MG/DL — HIGH (ref 70–99)
GLUCOSE BLDC GLUCOMTR-MCNC: 109 MG/DL — HIGH (ref 70–99)
GLUCOSE BLDC GLUCOMTR-MCNC: 111 MG/DL — HIGH (ref 70–99)
GLUCOSE BLDC GLUCOMTR-MCNC: 113 MG/DL — HIGH (ref 70–99)
GLUCOSE BLDC GLUCOMTR-MCNC: 126 MG/DL — HIGH (ref 70–99)
GLUCOSE BLDC GLUCOMTR-MCNC: 130 MG/DL — HIGH (ref 70–99)
GLUCOSE BLDC GLUCOMTR-MCNC: 130 MG/DL — HIGH (ref 70–99)
GLUCOSE BLDC GLUCOMTR-MCNC: 132 MG/DL — HIGH (ref 70–99)
GLUCOSE BLDC GLUCOMTR-MCNC: 134 MG/DL — HIGH (ref 70–99)
GLUCOSE BLDC GLUCOMTR-MCNC: 143 MG/DL — HIGH (ref 70–99)
GLUCOSE BLDC GLUCOMTR-MCNC: 144 MG/DL — HIGH (ref 70–99)
GLUCOSE BLDC GLUCOMTR-MCNC: 144 MG/DL — HIGH (ref 70–99)
GLUCOSE BLDC GLUCOMTR-MCNC: 147 MG/DL — HIGH (ref 70–99)
GLUCOSE BLDC GLUCOMTR-MCNC: 152 MG/DL — HIGH (ref 70–99)
GLUCOSE BLDC GLUCOMTR-MCNC: 154 MG/DL — HIGH (ref 70–99)
GLUCOSE BLDC GLUCOMTR-MCNC: 157 MG/DL — HIGH (ref 70–99)
GLUCOSE BLDC GLUCOMTR-MCNC: 158 MG/DL — HIGH (ref 70–99)
GLUCOSE BLDC GLUCOMTR-MCNC: 166 MG/DL — HIGH (ref 70–99)
GLUCOSE BLDC GLUCOMTR-MCNC: 92 MG/DL — SIGNIFICANT CHANGE UP (ref 70–99)
GLUCOSE BLDC GLUCOMTR-MCNC: 94 MG/DL — SIGNIFICANT CHANGE UP (ref 70–99)
GLUCOSE SERPL-MCNC: 129 MG/DL — HIGH (ref 70–99)
HCO3 BLDA-SCNC: 27 MMOL/L — SIGNIFICANT CHANGE UP (ref 21–28)
HCO3 BLDA-SCNC: 27 MMOL/L — SIGNIFICANT CHANGE UP (ref 21–28)
HCT VFR BLD CALC: 23.6 % — LOW (ref 39–50)
HCT VFR BLD CALC: 24.2 % — LOW (ref 39–50)
HGB BLD-MCNC: 7.9 G/DL — LOW (ref 13–17)
HGB BLD-MCNC: 7.9 G/DL — LOW (ref 13–17)
HOROWITZ INDEX BLDA+IHG-RTO: 50 — SIGNIFICANT CHANGE UP
MAGNESIUM SERPL-MCNC: 2.4 MG/DL — SIGNIFICANT CHANGE UP (ref 1.6–2.6)
MCHC RBC-ENTMCNC: 29.4 PG — SIGNIFICANT CHANGE UP (ref 27–34)
MCHC RBC-ENTMCNC: 29.4 PG — SIGNIFICANT CHANGE UP (ref 27–34)
MCHC RBC-ENTMCNC: 32.6 GM/DL — SIGNIFICANT CHANGE UP (ref 32–36)
MCHC RBC-ENTMCNC: 33.5 GM/DL — SIGNIFICANT CHANGE UP (ref 32–36)
MCV RBC AUTO: 87.7 FL — SIGNIFICANT CHANGE UP (ref 80–100)
MCV RBC AUTO: 90 FL — SIGNIFICANT CHANGE UP (ref 80–100)
NRBC # BLD: 0 /100 WBCS — SIGNIFICANT CHANGE UP (ref 0–0)
NRBC # BLD: 0 /100 WBCS — SIGNIFICANT CHANGE UP (ref 0–0)
PCO2 BLDA: 34 MMHG — LOW (ref 35–48)
PCO2 BLDA: 36 MMHG — SIGNIFICANT CHANGE UP (ref 35–48)
PH BLDA: 7.48 — HIGH (ref 7.35–7.45)
PH BLDA: 7.51 — HIGH (ref 7.35–7.45)
PHOSPHATE SERPL-MCNC: 3.6 MG/DL — SIGNIFICANT CHANGE UP (ref 2.5–4.5)
PLATELET # BLD AUTO: 86 K/UL — LOW (ref 150–400)
PLATELET # BLD AUTO: 96 K/UL — LOW (ref 150–400)
PO2 BLDA: 82 MMHG — LOW (ref 83–108)
PO2 BLDA: 84 MMHG — SIGNIFICANT CHANGE UP (ref 83–108)
POTASSIUM SERPL-MCNC: 3.6 MMOL/L — SIGNIFICANT CHANGE UP (ref 3.5–5.3)
POTASSIUM SERPL-SCNC: 3.6 MMOL/L — SIGNIFICANT CHANGE UP (ref 3.5–5.3)
PROT SERPL-MCNC: 5.9 G/DL — LOW (ref 6–8.3)
RBC # BLD: 2.69 M/UL — LOW (ref 4.2–5.8)
RBC # BLD: 2.69 M/UL — LOW (ref 4.2–5.8)
RBC # FLD: 15.6 % — HIGH (ref 10.3–14.5)
RBC # FLD: 15.6 % — HIGH (ref 10.3–14.5)
RH IG SCN BLD-IMP: NEGATIVE — SIGNIFICANT CHANGE UP
SAO2 % BLDA: 98.3 % — HIGH (ref 94–98)
SAO2 % BLDA: 99.1 % — HIGH (ref 94–98)
SODIUM SERPL-SCNC: 142 MMOL/L — SIGNIFICANT CHANGE UP (ref 135–145)
WBC # BLD: 7.65 K/UL — SIGNIFICANT CHANGE UP (ref 3.8–10.5)
WBC # BLD: 8.12 K/UL — SIGNIFICANT CHANGE UP (ref 3.8–10.5)
WBC # FLD AUTO: 7.65 K/UL — SIGNIFICANT CHANGE UP (ref 3.8–10.5)
WBC # FLD AUTO: 8.12 K/UL — SIGNIFICANT CHANGE UP (ref 3.8–10.5)

## 2021-09-02 PROCEDURE — 99291 CRITICAL CARE FIRST HOUR: CPT

## 2021-09-02 PROCEDURE — 71045 X-RAY EXAM CHEST 1 VIEW: CPT | Mod: 26

## 2021-09-02 RX ORDER — POTASSIUM CHLORIDE 20 MEQ
10 PACKET (EA) ORAL ONCE
Refills: 0 | Status: COMPLETED | OUTPATIENT
Start: 2021-09-02 | End: 2021-09-02

## 2021-09-02 RX ORDER — MAGNESIUM SULFATE 500 MG/ML
2 VIAL (ML) INJECTION ONCE
Refills: 0 | Status: COMPLETED | OUTPATIENT
Start: 2021-09-02 | End: 2021-09-02

## 2021-09-02 RX ORDER — METOPROLOL TARTRATE 50 MG
2.5 TABLET ORAL ONCE
Refills: 0 | Status: COMPLETED | OUTPATIENT
Start: 2021-09-02 | End: 2021-09-02

## 2021-09-02 RX ORDER — HYDROMORPHONE HYDROCHLORIDE 2 MG/ML
0.5 INJECTION INTRAMUSCULAR; INTRAVENOUS; SUBCUTANEOUS ONCE
Refills: 0 | Status: DISCONTINUED | OUTPATIENT
Start: 2021-09-02 | End: 2021-09-02

## 2021-09-02 RX ORDER — AMIODARONE HYDROCHLORIDE 400 MG/1
150 TABLET ORAL ONCE
Refills: 0 | Status: COMPLETED | OUTPATIENT
Start: 2021-09-02 | End: 2021-09-02

## 2021-09-02 RX ORDER — METOPROLOL TARTRATE 50 MG
12.5 TABLET ORAL EVERY 8 HOURS
Refills: 0 | Status: DISCONTINUED | OUTPATIENT
Start: 2021-09-02 | End: 2021-09-02

## 2021-09-02 RX ORDER — METOPROLOL TARTRATE 50 MG
5 TABLET ORAL EVERY 6 HOURS
Refills: 0 | Status: DISCONTINUED | OUTPATIENT
Start: 2021-09-02 | End: 2021-09-03

## 2021-09-02 RX ORDER — ACETAMINOPHEN 500 MG
1000 TABLET ORAL ONCE
Refills: 0 | Status: COMPLETED | OUTPATIENT
Start: 2021-09-02 | End: 2021-09-02

## 2021-09-02 RX ORDER — ASPIRIN/CALCIUM CARB/MAGNESIUM 324 MG
300 TABLET ORAL DAILY
Refills: 0 | Status: DISCONTINUED | OUTPATIENT
Start: 2021-09-02 | End: 2021-09-03

## 2021-09-02 RX ORDER — METOPROLOL TARTRATE 50 MG
2.5 TABLET ORAL EVERY 6 HOURS
Refills: 0 | Status: DISCONTINUED | OUTPATIENT
Start: 2021-09-02 | End: 2021-09-02

## 2021-09-02 RX ADMIN — Medication 2.5 MILLIGRAM(S): at 17:54

## 2021-09-02 RX ADMIN — Medication 1 APPLICATION(S): at 12:23

## 2021-09-02 RX ADMIN — HYDROMORPHONE HYDROCHLORIDE 0.5 MILLIGRAM(S): 2 INJECTION INTRAMUSCULAR; INTRAVENOUS; SUBCUTANEOUS at 10:45

## 2021-09-02 RX ADMIN — MUPIROCIN 1 APPLICATION(S): 20 OINTMENT TOPICAL at 17:12

## 2021-09-02 RX ADMIN — Medication 2.5 MILLIGRAM(S): at 23:23

## 2021-09-02 RX ADMIN — ENOXAPARIN SODIUM 40 MILLIGRAM(S): 100 INJECTION SUBCUTANEOUS at 12:21

## 2021-09-02 RX ADMIN — Medication 1 APPLICATION(S): at 05:21

## 2021-09-02 RX ADMIN — Medication 300 MILLIGRAM(S): at 17:13

## 2021-09-02 RX ADMIN — Medication 2.5 MILLIGRAM(S): at 13:07

## 2021-09-02 RX ADMIN — PANTOPRAZOLE SODIUM 40 MILLIGRAM(S): 20 TABLET, DELAYED RELEASE ORAL at 12:21

## 2021-09-02 RX ADMIN — Medication 1 APPLICATION(S): at 17:13

## 2021-09-02 RX ADMIN — Medication 1000 MILLIGRAM(S): at 11:15

## 2021-09-02 RX ADMIN — Medication 68 MICROGRAM(S): at 22:17

## 2021-09-02 RX ADMIN — AMIODARONE HYDROCHLORIDE 600 MILLIGRAM(S): 400 TABLET ORAL at 23:23

## 2021-09-02 RX ADMIN — MUPIROCIN 1 APPLICATION(S): 20 OINTMENT TOPICAL at 05:21

## 2021-09-02 RX ADMIN — AMIODARONE HYDROCHLORIDE 600 MILLIGRAM(S): 400 TABLET ORAL at 09:24

## 2021-09-02 RX ADMIN — HYDROMORPHONE HYDROCHLORIDE 0.5 MILLIGRAM(S): 2 INJECTION INTRAMUSCULAR; INTRAVENOUS; SUBCUTANEOUS at 10:30

## 2021-09-02 RX ADMIN — HYDROMORPHONE HYDROCHLORIDE 0.5 MILLIGRAM(S): 2 INJECTION INTRAMUSCULAR; INTRAVENOUS; SUBCUTANEOUS at 01:17

## 2021-09-02 RX ADMIN — CHLORHEXIDINE GLUCONATE 15 MILLILITER(S): 213 SOLUTION TOPICAL at 05:21

## 2021-09-02 RX ADMIN — CHLORHEXIDINE GLUCONATE 1 APPLICATION(S): 213 SOLUTION TOPICAL at 05:21

## 2021-09-02 RX ADMIN — HYDROMORPHONE HYDROCHLORIDE 0.5 MILLIGRAM(S): 2 INJECTION INTRAMUSCULAR; INTRAVENOUS; SUBCUTANEOUS at 01:47

## 2021-09-02 RX ADMIN — Medication 50 MILLIEQUIVALENT(S): at 01:24

## 2021-09-02 RX ADMIN — AMIODARONE HYDROCHLORIDE 600 MILLIGRAM(S): 400 TABLET ORAL at 05:21

## 2021-09-02 RX ADMIN — Medication 400 MILLIGRAM(S): at 11:00

## 2021-09-02 RX ADMIN — Medication 50 GRAM(S): at 09:24

## 2021-09-02 NOTE — PHYSICAL THERAPY INITIAL EVALUATION ADULT - ACTIVE RANGE OF MOTION EXAMINATION, REHAB EVAL
except L knee maintains in ext, no knee flexion ROM per pt/bilateral upper extremity Active ROM was WFL (within functional limits)/bilateral  lower extremity Active ROM was WFL (within functional limits)/deficits as listed below
except L knee fused in extension from prior L knee surgeries/bilateral upper extremity Active ROM was WFL (within functional limits)/bilateral  lower extremity Active ROM was WFL (within functional limits)

## 2021-09-02 NOTE — PHYSICAL THERAPY INITIAL EVALUATION ADULT - GENERAL OBSERVATIONS, REHAB EVAL
Pt received supine in bed, +face mask, +RIJ central line, +femoral negra (cleared for OOB by CTU team), +radial negra, +delgadillo, +CTx3, +temporary pacemaker, +tele, +pulse ox
Received in bed, +tele, +IVL, L orthotic shoe for leg length discrepancy donned with assist, no complaints, agreeable to PT

## 2021-09-02 NOTE — PHYSICAL THERAPY INITIAL EVALUATION ADULT - GAIT TRAINING, PT EVAL
GOAL: Pt will amb 50 ft with Lizeth AMES in 2 weeks.
GOAL: Pt will ambulate with or without appropriate assistive device x 150 feet indep in 2 weeks.

## 2021-09-02 NOTE — PHYSICAL THERAPY INITIAL EVALUATION ADULT - BALANCE TRAINING, PT EVAL
GOAL: Pt will improve sitting/standing balance by 1 grade to assist with functional mobility in 2 weeks.
GOAL: Pt will demonstrate improved static/dynamic balance in standing where deficient by at least 1 grade to improve stability, decrease fall risk, and increase independence in ADLs within 2 weeks.

## 2021-09-02 NOTE — PHYSICAL THERAPY INITIAL EVALUATION ADULT - PERTINENT HX OF CURRENT PROBLEM, REHAB EVAL
76M h/o HT PPM, CAD, lymphedema, HTN p/w LBP & SOB. Pt reports pain across entire lower back which began 5 days ago; denies any trauma nor falls; denies leg numbness, tingling, leg weakness, urinary/bowl incontinence. Pt states the pain has improved but he is still using his walker to walk instead of his cane. Pt reports DUNCAN for the past 4 days that he is only able to walk short distances with becoming dyspnea and has leg swelling. Pt denies fever, chills, cough, chest pain, orthopnea, PND.
Pt is a 77 y/o male with PMH of HTN PPM, CAD.  LYMPHEDEMA. HTN pw low back pain and SOB. Patient states his low back pain began 5 days ago. He denies any trauma or falls. States the pain was across his entire lower back. Denies  leg   numbness, tingling, leg weakness, urinary or bowl incontinence. He states the pain has improved but states he is still using his walker to walk instead of his cane. He states of  the past 4 days he has also noticed DUNCAN.

## 2021-09-02 NOTE — PHYSICAL THERAPY INITIAL EVALUATION ADULT - TRANSFER TRAINING, PT EVAL
GOAL: Pt will perform sit<>stand with Lizeth in 2 weeks.
GOAL: Pt will perform all transfers with or without appropriate assistive device indep in 2 weeks

## 2021-09-02 NOTE — PROGRESS NOTE ADULT - SUBJECTIVE AND OBJECTIVE BOX
CARDIOLOGY     PROGRESS  NOTE   ________________________________________________    CHIEF COMPLAINT:Patient is a 76y old  Male who presents with a chief complaint of sob (02 Sep 2021 06:27)  s/p extubation.  	  REVIEW OF SYSTEMS:  CONSTITUTIONAL: No fever, weight loss, or fatigue  EYES: No eye pain, visual disturbances, or discharge  ENT:  No difficulty hearing, tinnitus, vertigo; No sinus or throat pain  NECK: No pain or stiffness  RESPIRATORY: No cough, wheezing, chills or hemoptysis; No Shortness of Breath  CARDIOVASCULAR: No chest pain, palpitations, passing out, dizziness, or leg swelling  GASTROINTESTINAL: No abdominal or epigastric pain. No nausea, vomiting, or hematemesis; No diarrhea or constipation. No melena or hematochezia.  GENITOURINARY: No dysuria, frequency, hematuria, or incontinence  NEUROLOGICAL: No headaches, memory loss, loss of strength, numbness, or tremors  SKIN: No itching, burning, rashes, or lesions   LYMPH Nodes: No enlarged glands  ENDOCRINE: No heat or cold intolerance; No hair loss  MUSCULOSKELETAL: No joint pain or swelling; No muscle, back, or extremity pain  PSYCHIATRIC: No depression, anxiety, mood swings, or difficulty sleeping  HEME/LYMPH: No easy bruising, or bleeding gums  ALLERGY AND IMMUNOLOGIC: No hives or eczema	    [ ] All others negative	  [ ] Unable to obtain    PHYSICAL EXAM:  T(C): 36.9 (09-02-21 @ 08:00), Max: 37.4 (09-02-21 @ 04:00)  HR: 81 (09-02-21 @ 08:00) (59 - 109)  BP: 98/56 (09-01-21 @ 11:30) (98/56 - 98/56)  RR: 15 (09-02-21 @ 08:00) (6 - 47)  SpO2: 96% (09-02-21 @ 08:00) (86% - 100%)  Wt(kg): --  I&O's Summary    01 Sep 2021 07:01  -  02 Sep 2021 07:00  --------------------------------------------------------  IN: 1132.2 mL / OUT: 4100 mL / NET: -2967.8 mL    02 Sep 2021 07:01  -  02 Sep 2021 08:32  --------------------------------------------------------  IN: 18.2 mL / OUT: 55 mL / NET: -36.8 mL  Mode: CPAP with PS  FiO2: 50  PEEP: 5  PS: 5  ITime: 1  MAP      Appearance: Normal	  HEENT:   Normal oral mucosa, PERRL, EOMI	  Lymphatic: No lymphadenopathy  Cardiovascular: Normal S1 S2, No JVD, + murmurs, No edema  Respiratory: Lungs clear to auscultation	  Psychiatry: A & O x 3, Mood & affect appropriate  Gastrointestinal:  Soft, Non-tender, + BS	  Skin: No rashes, No ecchymoses, No cyanosis	  Neurologic: Non-focal  Extremities: Normal range of motion, No clubbing, cyanosis or edema  Vascular: Peripheral pulses palpable 2+ bilaterally    MEDICATIONS  (STANDING):  aMIOdarone IVPB 150 milliGRAM(s) IV Intermittent once  aspirin  chewable 81 milliGRAM(s) Oral daily  chlorhexidine 2% Cloths 1 Application(s) Topical <User Schedule>  clotrimazole 1% Cream 1 Application(s) Topical two times a day  enoxaparin Injectable 40 milliGRAM(s) SubCutaneous daily  hydrocortisone 1% Cream 1 Application(s) Topical daily  insulin regular Infusion 3 Unit(s)/Hr (3 mL/Hr) IV Continuous <Continuous>  levothyroxine Injectable 68 MICROGram(s) IV Push at bedtime  magnesium sulfate  IVPB 2 Gram(s) IV Intermittent once  mupirocin 2% Ointment 1 Application(s) Topical two times a day  pantoprazole  Injectable 40 milliGRAM(s) IV Push daily  polyethylene glycol 3350 17 Gram(s) Oral daily  sodium chloride 0.9%. 1000 milliLiter(s) (10 mL/Hr) IV Continuous <Continuous>      TELEMETRY: 	    ECG:  	  RADIOLOGY:  OTHER: 	  	  LABS:	 	    CARDIAC MARKERS:                                7.9    7.65  )-----------( 86       ( 02 Sep 2021 04:04 )             23.6     09-02    142  |  107  |  50<H>  ----------------------------<  129<H>  3.6   |  24  |  1.30    Ca    8.7      02 Sep 2021 00:29  Phos  3.6     09-02  Mg     2.4     09-02    TPro  5.9<L>  /  Alb  3.9  /  TBili  0.7  /  DBili  x   /  AST  23  /  ALT  9<L>  /  AlkPhos  57  09-02    proBNP: Serum Pro-Brain Natriuretic Peptide: 1881 pg/mL (08-18 @ 16:10)    Lipid Profile:   HgA1c:   TSH: Thyroid Stimulating Hormone, Serum: 5.02 uIU/mL (08-25 @ 22:25)          Assessment and plan  ---------------------------  s/p CABG 2 vessels and MVR/ bio  post op bleeding, s/p blood transfusion  sedated on vent still on dobutamine  hgb stable  started on amio sec to a.fib  need to maintain NSR   ppm noted hr has increase hr  to 80 jose 120 AAAIR/ DDDR  s/p extubation  ?AC post MVR as per ct surgery  need to change PPM  battery prior to Discharge

## 2021-09-02 NOTE — PHYSICAL THERAPY INITIAL EVALUATION ADULT - ADDITIONAL COMMENTS
Pt lives in an elevator-accessible apartment, 2 steps to enter with handrail. Pt owns straight cane, grab bars, 3-wheeled walker
Pt lives in an elevator-accessible apartment, 2 steps to enter with handrail. Pt owns straight cane, grab bars, 3-wheeled walker

## 2021-09-02 NOTE — PROGRESS NOTE ADULT - SUBJECTIVE AND OBJECTIVE BOX
CRITICAL CARE ATTENDING - CTICU    MEDICATIONS  (STANDING):  aspirin  chewable 81 milliGRAM(s) Oral daily  chlorhexidine 0.12% Liquid 15 milliLiter(s) Oral Mucosa every 12 hours  chlorhexidine 2% Cloths 1 Application(s) Topical <User Schedule>  clotrimazole 1% Cream 1 Application(s) Topical two times a day  dexMEDEtomidine Infusion 0.5 MICROgram(s)/kG/Hr (12 mL/Hr) IV Continuous <Continuous>  enoxaparin Injectable 40 milliGRAM(s) SubCutaneous daily  hydrocortisone 1% Cream 1 Application(s) Topical daily  insulin regular Infusion 3 Unit(s)/Hr (3 mL/Hr) IV Continuous <Continuous>  levothyroxine Injectable 68 MICROGram(s) IV Push at bedtime  mupirocin 2% Ointment 1 Application(s) Topical two times a day  pantoprazole  Injectable 40 milliGRAM(s) IV Push daily  polyethylene glycol 3350 17 Gram(s) Oral daily  sodium chloride 0.9%. 1000 milliLiter(s) (10 mL/Hr) IV Continuous <Continuous>  vasopressin Infusion 0.067 Unit(s)/Min (4 mL/Hr) IV Continuous <Continuous>                                    7.9    7.65  )-----------( 86       ( 02 Sep 2021 04:04 )             23.6       0902    142  |  107  |  50<H>  ----------------------------<  129<H>  3.6   |  24  |  1.30    Ca    8.7      02 Sep 2021 00:29  Phos  3.6       Mg     2.4         TPro  5.9<L>  /  Alb  3.9  /  TBili  0.7  /  DBili  x   /  AST  23  /  ALT  9<L>  /  AlkPhos  57            Mode: CPAP with PS  FiO2: 50  PEEP: 5  PS: 5  ITime: 1  MAP: 9  PIP: 23`      Daily     Daily Weight in k (02 Sep 2021 00:00)      08-31 @ 07:01  -   @ 07:00  --------------------------------------------------------  IN: 1385.3 mL / OUT: 2655 mL / NET: -1269.7 mL     @ 07: @ 06:27  --------------------------------------------------------  IN: 993.3 mL / OUT: 3930 mL / NET: -2936.7 mL        Critically Ill patient  : [ ] preoperative ,   [x ] post operative    Requires :  [x] Arterial Line   [x] Central Line  [ ] PA catheter  [ ] IABP  [ ] ECMO  [ ] LVAD  [x] Ventilator  [x] pacemaker- PPM [ ] Impella.                      [x] ABG's     [x] Pulse Oxymetry Monitoring  Bedside evaluation , monitoring , treatment of hemodynamics , fluids , IVP/ IVCD meds.        Diagnosis:     POD 3 - MVR(T) / C2L     Hypotension    Post Op Bleeding    Anemia - acute blood loss    s/p transfusion of PRBC's / Blood Products    Chest tube drainage     Requires chest PT, pulmonary toilet, ambu bagging, suctioning to maintain SaO2,  patent airway and treat atelectasis.     Sedated with IVCD Precedex to maintain vent synchrony     Ventilator Management:  [  ]AC-rest    [x ]CPAP-PS Wean    [ ]Trach Collar     [ ]Extubate    [ x] T-Piece  [ ]peep>5     CHF- acute [ x]   chronic [ ]    systolic [ x]   diastolic [ ]          - Echo- EF -       nml      [x ] RV dysfunction          - Cxr-cardiomegally, edema          - Clinical-  [ ] ]inotropes   [x ]pressors   [ ]diuresis   [ ]IABP   [ ]ECMO   [ ]LVAD   [x ] Ventilator      Hemodynamic lability,  instability. Requires IVCD [x ] vasopressors [ ] inotropes  [ ] vasodilator  [ ]IVSS fluid  to maintain MAP, perfusion, C.I.     IVCD insulin     Thrombocytopenia     Prerenal azotemia     Requires bedside physical therapy, mobilization and total intermediate care.     Hypoxia / 5 peep / 50% FiO2        By signing my name below, I, Melvi Ontiveros, attest that this documentation has been prepared under the direction and in the presence of Tony Israel MD.   Electronically Signed: Alexandra Jones 21 @ 06:27      Discussed with CT surgeon, Physician Assistant - Nurse Practitioner- Critical care medicine team.   Discussed at  AM / PM rounds.   Chart, labs , films reviewed.    Cumulative Critical Care Time Given Today:  CRITICAL CARE ATTENDING - [facility]    MEDICATIONS  (STANDING):  aspirin  chewable 81 milliGRAM(s) Oral daily  chlorhexidine 0.12% Liquid 15 milliLiter(s) Oral Mucosa every 12 hours  chlorhexidine 2% Cloths 1 Application(s) Topical <User Schedule>  clotrimazole 1% Cream 1 Application(s) Topical two times a day  dexMEDEtomidine Infusion 0.5 MICROgram(s)/kG/Hr (12 mL/Hr) IV Continuous <Continuous>  enoxaparin Injectable 40 milliGRAM(s) SubCutaneous daily  hydrocortisone 1% Cream 1 Application(s) Topical daily  insulin regular Infusion 3 Unit(s)/Hr (3 mL/Hr) IV Continuous <Continuous>  levothyroxine Injectable 68 MICROGram(s) IV Push at bedtime  mupirocin 2% Ointment 1 Application(s) Topical two times a day  pantoprazole  Injectable 40 milliGRAM(s) IV Push daily  polyethylene glycol 3350 17 Gram(s) Oral daily  sodium chloride 0.9%. 1000 milliLiter(s) (10 mL/Hr) IV Continuous <Continuous>  vasopressin Infusion 0.067 Unit(s)/Min (4 mL/Hr) IV Continuous <Continuous>                                    7.9    7.65  )-----------( 86       ( 02 Sep 2021 04:04 )             23.6       0902    142  |  107  |  50<H>  ----------------------------<  129<H>  3.6   |  24  |  1.30    Ca    8.7      02 Sep 2021 00:29  Phos  3.6       Mg     2.4         TPro  5.9<L>  /  Alb  3.9  /  TBili  0.7  /  DBili  x   /  AST  23  /  ALT  9<L>  /  AlkPhos  57            Mode: CPAP with PS  FiO2: 50  PEEP: 5  PS: 5  ITime: 1  MAP: 9  PIP: 23`      Daily     Daily Weight in k (02 Sep 2021 00:00)      08-31 @ 07:01  -   @ 07:00  --------------------------------------------------------  IN: 1385.3 mL / OUT: 2655 mL / NET: -1269.7 mL     @ 07: @ 06:27  --------------------------------------------------------  IN: 993.3 mL / OUT: 3930 mL / NET: -2936.7 mL        Critically Ill patient  : [ ] preoperative ,   [x ] post operative    Requires :  [x] Arterial Line   [x] Central Line  [ ] PA catheter  [ ] IABP  [ ] ECMO  [ ] LVAD  [x] Ventilator  [x] pacemaker- PPM [ ] Impella.                      [x] ABG's     [x] Pulse Oxymetry Monitoring  Bedside evaluation , monitoring , treatment of hemodynamics , fluids , IVP/ IVCD meds.        Diagnosis:     POD 3 - MVR(T) / C2L     Hypotension    Post Op Bleeding    Anemia - acute blood loss    s/p transfusion of PRBC's / Blood Products    Fluid  overload     Chest tube drainage     Requires chest PT, pulmonary toilet, ambu bagging, suctioning to maintain SaO2,  patent airway and treat atelectasis.     Sedated with IVCD Precedex to maintain vent synchrony     Ventilator Management:  [  ]AC-rest    [x ]CPAP-PS Wean    [ ]Trach Collar     [x ]Extubate this AM   [ x] T-Piece  [ ]peep>5     CHF- acute [ x]   chronic [ ]    systolic [ x]   diastolic [ ]          - Echo- EF -       nml      [x ] RV dysfunction          - Cxr-cardiomegally, edema          - Clinical-  [ ] ]inotropes   [x ]pressors   [ ]diuresis   [ ]IABP   [ ]ECMO   [ ]LVAD   [x ] Ventilator      Increase PPM rate to 80 / min    Hemodynamic lability,  instability. Requires IVCD [x ] vasopressors [ ] inotropes  [ ] vasodilator  [ ]IVSS fluid  to maintain MAP, perfusion, C.I.     IVCD insulin     Thrombocytopenia     Prerenal azotemia     Requires bedside physical therapy, mobilization and total jail care.     Hypoxia / 5 peep / 50% FiO2    Rapid A Fib        By signing my name below, I, Melvi Ontiveros, attest that this documentation has been prepared under the direction and in the presence of Tony Israel MD.   Electronically Signed: Alexandra Jones 21 @ 06:27    I, Tony Israel, personally performed the services described in this documentation. All medical record entries made by the scribe were at my direction and in my presence. I have reviewed the chart and agree that the record reflects my personal performance and is accurate and complete.   Tony Israel MD.       Discussed with CT surgeon, Physician Assistant - Nurse Practitioner- Critical care medicine team.   Discussed at  AM / PM rounds.   Chart, labs , films reviewed.    Cumulative Critical Care Time Given Today:  30 min

## 2021-09-02 NOTE — AIRWAY REMOVAL NOTE  ADULT & PEDS - ARTIFICAL AIRWAY REMOVAL COMMENTS
Written order for extubation verified. The patient was identified by full name and birth date compared to the identification band. Present during the procedure was fuentes JAY

## 2021-09-02 NOTE — PHYSICAL THERAPY INITIAL EVALUATION ADULT - CRITERIA FOR SKILLED THERAPEUTIC INTERVENTIONS
impairments found/functional limitations in following categories/risk reduction/prevention/rehab potential/therapy frequency/predicted duration of therapy intervention/anticipated discharge recommendation
impairments found/risk reduction/prevention/anticipated discharge recommendation

## 2021-09-02 NOTE — PHYSICAL THERAPY INITIAL EVALUATION ADULT - FOLLOWS COMMANDS/ANSWERS QUESTIONS, REHAB EVAL
100% of the time/able to follow single-step instructions
75% of the time/able to follow single-step instructions

## 2021-09-02 NOTE — PHYSICAL THERAPY INITIAL EVALUATION ADULT - PLANNED THERAPY INTERVENTIONS, PT EVAL
balance training/bed mobility training/gait training/transfer training
stairs: GOAL: Pt will negotiate 2 steps of stairs with or without appropriate assistive device and handrail via reciprocal/step-to technique indep in 2 weeks./balance training/gait training/transfer training

## 2021-09-02 NOTE — PHYSICAL THERAPY INITIAL EVALUATION ADULT - PRECAUTIONS/LIMITATIONS, REHAB EVAL
States he is only able to walk short distances with becoming dyspnea  and has  leg swelling. Patient found to have severe MR and CAD, s/p MVR/C2L 8/30. Early post operative bleeding required transfusion of 4 units of PRBC, 4 platelets, 3 cryo, 2 FFP and 5 grams of Amicar x 2./fall precautions/sternal precautions
cardiac precautions/fall precautions

## 2021-09-02 NOTE — PHYSICAL THERAPY INITIAL EVALUATION ADULT - MODALITIES TREATMENT COMMENTS
Pt with hx of LLE LLD, knee maintained in extended position, L orthotic shoe donned for session. Pt with hx of LLE LLD, knee maintained in extended position secondary to hx of L knee fusion, L orthotic shoe donned for session. 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

## 2021-09-02 NOTE — PHYSICAL THERAPY INITIAL EVALUATION ADULT - BALANCE DISTURBANCE, IDENTIFIED IMPAIRMENT CONTRIBUTE, REHAB EVAL
decreased ROM/decreased strength
pain/impaired postural control/decreased ROM/decreased sensation/decreased strength

## 2021-09-02 NOTE — PROGRESS NOTE ADULT - SUBJECTIVE AND OBJECTIVE BOX
INTERVAL HPI/OVERNIGHT EVENTS:  s/p CABG/MVR, pt extubated, events noted  hgb stale      MEDICATIONS  (PRN):      Allergies    alfuzosin (Hives)  levofloxacin (Hives)  Myrbetriq (Hives)  tamsulosin (Hives)    Intolerances      ROS   unable to obtain         PHYSICAL EXAM:   Vital Signs:  Vital Signs Last 24 Hrs  T(C): 37.1 (29 Aug 2021 04:21), Max: 37.1 (29 Aug 2021 04:21)  T(F): 98.7 (29 Aug 2021 04:21), Max: 98.7 (29 Aug 2021 04:21)  HR: 55 (29 Aug 2021 04:21) (55 - 78)  BP: 116/61 (29 Aug 2021 04:21) (116/61 - 146/63)  BP(mean): --  RR: 18 (29 Aug 2021 04:21) (18 - 18)  SpO2: 93% (29 Aug 2021 04:21) (93% - 96%)  Daily     Daily Weight in k.2 (29 Aug 2021 07:30)I&O's Summary    28 Aug 2021 07:  -  29 Aug 2021 07:00  --------------------------------------------------------  IN: 1110 mL / OUT: 5 mL / NET: -915 mL    29 Aug 2021 07:  -  29 Aug 2021 10:27  --------------------------------------------------------  IN: 240 mL / OUT: 800 mL / NET: -560 mL        GENERAL:  Appears stated age, well-groomed, well-nourished, no distress  HEENT:  NC/AT,  conjunctivae clear and pink, no thyromegaly, nodules, adenopathy, no JVD, sclera -anicteric  CHEST:  Full & symmetric excursion, no increased effort, breath sounds clear  HEART:  Regular rhythm, S1, S2, no murmur/rub/S3/S4, no abdominal bruit, no edema  ABDOMEN:  Soft, non-tender, non-distended, normoactive bowel sounds,  no masses ,no hepato-splenomegaly, no signs of chronic liver disease  EXTEREMITIES:  no cyanosis,clubbing or edema  SKIN:  No rash/erythema/ecchymoses/petechiae/wounds/abscess/warm/dry  NEURO:  Alert, oriented, no asterixis, no tremor, no encephalopathy      LABS:                        11.5   5.65  )-----------( 138      ( 29 Aug 2021 06:30 )             36.1     08    139  |  105  |  44<H>  ----------------------------<  113<H>  4.5   |  21<L>  |  1.06    Ca    9.8      29 Aug 2021 06:30          amylase   lipase  RADIOLOGY & ADDITIONAL TESTS:

## 2021-09-02 NOTE — PHYSICAL THERAPY INITIAL EVALUATION ADULT - IMPAIRED TRANSFERS: SIT/STAND, REHAB EVAL
impaired balance/decreased ROM/decreased strength
transferred to chair via stand pivot, maxAx2/impaired balance/pain/impaired postural control/decreased ROM/decreased strength

## 2021-09-03 LAB
ALBUMIN SERPL ELPH-MCNC: 3.8 G/DL — SIGNIFICANT CHANGE UP (ref 3.3–5)
ALP SERPL-CCNC: 65 U/L — SIGNIFICANT CHANGE UP (ref 40–120)
ALT FLD-CCNC: 9 U/L — LOW (ref 10–45)
ANION GAP SERPL CALC-SCNC: 11 MMOL/L — SIGNIFICANT CHANGE UP (ref 5–17)
AST SERPL-CCNC: 14 U/L — SIGNIFICANT CHANGE UP (ref 10–40)
BASE EXCESS BLDA CALC-SCNC: 3.7 MMOL/L — HIGH (ref -2–3)
BILIRUB SERPL-MCNC: 0.7 MG/DL — SIGNIFICANT CHANGE UP (ref 0.2–1.2)
BUN SERPL-MCNC: 45 MG/DL — HIGH (ref 7–23)
CALCIUM SERPL-MCNC: 8.7 MG/DL — SIGNIFICANT CHANGE UP (ref 8.4–10.5)
CHLORIDE SERPL-SCNC: 109 MMOL/L — HIGH (ref 96–108)
CO2 BLDA-SCNC: 29 MMOL/L — HIGH (ref 19–24)
CO2 SERPL-SCNC: 24 MMOL/L — SIGNIFICANT CHANGE UP (ref 22–31)
CREAT SERPL-MCNC: 1.01 MG/DL — SIGNIFICANT CHANGE UP (ref 0.5–1.3)
GAS PNL BLDA: SIGNIFICANT CHANGE UP
GLUCOSE BLDC GLUCOMTR-MCNC: 100 MG/DL — HIGH (ref 70–99)
GLUCOSE BLDC GLUCOMTR-MCNC: 105 MG/DL — HIGH (ref 70–99)
GLUCOSE BLDC GLUCOMTR-MCNC: 108 MG/DL — HIGH (ref 70–99)
GLUCOSE BLDC GLUCOMTR-MCNC: 110 MG/DL — HIGH (ref 70–99)
GLUCOSE BLDC GLUCOMTR-MCNC: 110 MG/DL — HIGH (ref 70–99)
GLUCOSE BLDC GLUCOMTR-MCNC: 114 MG/DL — HIGH (ref 70–99)
GLUCOSE BLDC GLUCOMTR-MCNC: 117 MG/DL — HIGH (ref 70–99)
GLUCOSE BLDC GLUCOMTR-MCNC: 121 MG/DL — HIGH (ref 70–99)
GLUCOSE BLDC GLUCOMTR-MCNC: 139 MG/DL — HIGH (ref 70–99)
GLUCOSE BLDC GLUCOMTR-MCNC: 143 MG/DL — HIGH (ref 70–99)
GLUCOSE BLDC GLUCOMTR-MCNC: 149 MG/DL — HIGH (ref 70–99)
GLUCOSE BLDC GLUCOMTR-MCNC: 95 MG/DL — SIGNIFICANT CHANGE UP (ref 70–99)
GLUCOSE SERPL-MCNC: 154 MG/DL — HIGH (ref 70–99)
HCO3 BLDA-SCNC: 28 MMOL/L — SIGNIFICANT CHANGE UP (ref 21–28)
HCT VFR BLD CALC: 25.1 % — LOW (ref 39–50)
HGB BLD-MCNC: 8.1 G/DL — LOW (ref 13–17)
HOROWITZ INDEX BLDA+IHG-RTO: 40 — SIGNIFICANT CHANGE UP
MAGNESIUM SERPL-MCNC: 2.6 MG/DL — SIGNIFICANT CHANGE UP (ref 1.6–2.6)
MCHC RBC-ENTMCNC: 29.8 PG — SIGNIFICANT CHANGE UP (ref 27–34)
MCHC RBC-ENTMCNC: 32.3 GM/DL — SIGNIFICANT CHANGE UP (ref 32–36)
MCV RBC AUTO: 92.3 FL — SIGNIFICANT CHANGE UP (ref 80–100)
NRBC # BLD: 0 /100 WBCS — SIGNIFICANT CHANGE UP (ref 0–0)
PCO2 BLDA: 39 MMHG — SIGNIFICANT CHANGE UP (ref 35–48)
PH BLDA: 7.46 — HIGH (ref 7.35–7.45)
PHOSPHATE SERPL-MCNC: 2 MG/DL — LOW (ref 2.5–4.5)
PLATELET # BLD AUTO: 115 K/UL — LOW (ref 150–400)
PO2 BLDA: 85 MMHG — SIGNIFICANT CHANGE UP (ref 83–108)
POTASSIUM SERPL-MCNC: 3.8 MMOL/L — SIGNIFICANT CHANGE UP (ref 3.5–5.3)
POTASSIUM SERPL-SCNC: 3.8 MMOL/L — SIGNIFICANT CHANGE UP (ref 3.5–5.3)
PROT SERPL-MCNC: 6 G/DL — SIGNIFICANT CHANGE UP (ref 6–8.3)
RBC # BLD: 2.72 M/UL — LOW (ref 4.2–5.8)
RBC # FLD: 15.3 % — HIGH (ref 10.3–14.5)
SAO2 % BLDA: 99.2 % — HIGH (ref 94–98)
SODIUM SERPL-SCNC: 144 MMOL/L — SIGNIFICANT CHANGE UP (ref 135–145)
WBC # BLD: 11.4 K/UL — HIGH (ref 3.8–10.5)
WBC # FLD AUTO: 11.4 K/UL — HIGH (ref 3.8–10.5)

## 2021-09-03 PROCEDURE — 99291 CRITICAL CARE FIRST HOUR: CPT

## 2021-09-03 PROCEDURE — 71045 X-RAY EXAM CHEST 1 VIEW: CPT | Mod: 26

## 2021-09-03 PROCEDURE — 93306 TTE W/DOPPLER COMPLETE: CPT | Mod: 26

## 2021-09-03 PROCEDURE — 71045 X-RAY EXAM CHEST 1 VIEW: CPT | Mod: 26,77

## 2021-09-03 RX ORDER — ATORVASTATIN CALCIUM 80 MG/1
40 TABLET, FILM COATED ORAL AT BEDTIME
Refills: 0 | Status: DISCONTINUED | OUTPATIENT
Start: 2021-09-03 | End: 2021-09-16

## 2021-09-03 RX ORDER — FUROSEMIDE 40 MG
20 TABLET ORAL ONCE
Refills: 0 | Status: COMPLETED | OUTPATIENT
Start: 2021-09-03 | End: 2021-09-03

## 2021-09-03 RX ORDER — ASPIRIN/CALCIUM CARB/MAGNESIUM 324 MG
81 TABLET ORAL DAILY
Refills: 0 | Status: DISCONTINUED | OUTPATIENT
Start: 2021-09-03 | End: 2021-09-16

## 2021-09-03 RX ORDER — PANTOPRAZOLE SODIUM 20 MG/1
40 TABLET, DELAYED RELEASE ORAL
Refills: 0 | Status: DISCONTINUED | OUTPATIENT
Start: 2021-09-03 | End: 2021-09-16

## 2021-09-03 RX ORDER — DIGOXIN 250 MCG
500 TABLET ORAL ONCE
Refills: 0 | Status: COMPLETED | OUTPATIENT
Start: 2021-09-03 | End: 2021-09-03

## 2021-09-03 RX ORDER — INSULIN LISPRO 100/ML
VIAL (ML) SUBCUTANEOUS
Refills: 0 | Status: DISCONTINUED | OUTPATIENT
Start: 2021-09-03 | End: 2021-09-06

## 2021-09-03 RX ORDER — POTASSIUM CHLORIDE 20 MEQ
10 PACKET (EA) ORAL
Refills: 0 | Status: COMPLETED | OUTPATIENT
Start: 2021-09-03 | End: 2021-09-03

## 2021-09-03 RX ORDER — LEVOTHYROXINE SODIUM 125 MCG
137 TABLET ORAL DAILY
Refills: 0 | Status: DISCONTINUED | OUTPATIENT
Start: 2021-09-03 | End: 2021-09-16

## 2021-09-03 RX ORDER — MAGNESIUM SULFATE 500 MG/ML
1 VIAL (ML) INJECTION ONCE
Refills: 0 | Status: COMPLETED | OUTPATIENT
Start: 2021-09-03 | End: 2021-09-03

## 2021-09-03 RX ORDER — ESCITALOPRAM OXALATE 10 MG/1
5 TABLET, FILM COATED ORAL DAILY
Refills: 0 | Status: DISCONTINUED | OUTPATIENT
Start: 2021-09-03 | End: 2021-09-16

## 2021-09-03 RX ORDER — CILOSTAZOL 100 MG/1
100 TABLET ORAL EVERY 12 HOURS
Refills: 0 | Status: DISCONTINUED | OUTPATIENT
Start: 2021-09-03 | End: 2021-09-13

## 2021-09-03 RX ORDER — METOPROLOL TARTRATE 50 MG
25 TABLET ORAL EVERY 8 HOURS
Refills: 0 | Status: DISCONTINUED | OUTPATIENT
Start: 2021-09-03 | End: 2021-09-03

## 2021-09-03 RX ORDER — WARFARIN SODIUM 2.5 MG/1
3 TABLET ORAL ONCE
Refills: 0 | Status: COMPLETED | OUTPATIENT
Start: 2021-09-03 | End: 2021-09-03

## 2021-09-03 RX ORDER — METOPROLOL TARTRATE 50 MG
25 TABLET ORAL EVERY 8 HOURS
Refills: 0 | Status: DISCONTINUED | OUTPATIENT
Start: 2021-09-03 | End: 2021-09-05

## 2021-09-03 RX ADMIN — Medication 50 MILLIEQUIVALENT(S): at 03:06

## 2021-09-03 RX ADMIN — Medication 50 MILLIEQUIVALENT(S): at 01:45

## 2021-09-03 RX ADMIN — Medication 500 MICROGRAM(S): at 04:42

## 2021-09-03 RX ADMIN — Medication 1 APPLICATION(S): at 17:10

## 2021-09-03 RX ADMIN — MUPIROCIN 1 APPLICATION(S): 20 OINTMENT TOPICAL at 05:12

## 2021-09-03 RX ADMIN — Medication 25 MILLIGRAM(S): at 13:56

## 2021-09-03 RX ADMIN — Medication 50 MILLIEQUIVALENT(S): at 22:15

## 2021-09-03 RX ADMIN — PANTOPRAZOLE SODIUM 40 MILLIGRAM(S): 20 TABLET, DELAYED RELEASE ORAL at 12:06

## 2021-09-03 RX ADMIN — Medication 25 MILLIGRAM(S): at 21:17

## 2021-09-03 RX ADMIN — ESCITALOPRAM OXALATE 5 MILLIGRAM(S): 10 TABLET, FILM COATED ORAL at 12:34

## 2021-09-03 RX ADMIN — Medication 50 MILLIEQUIVALENT(S): at 02:25

## 2021-09-03 RX ADMIN — Medication 5 MILLIGRAM(S): at 00:58

## 2021-09-03 RX ADMIN — ATORVASTATIN CALCIUM 40 MILLIGRAM(S): 80 TABLET, FILM COATED ORAL at 21:17

## 2021-09-03 RX ADMIN — CHLORHEXIDINE GLUCONATE 1 APPLICATION(S): 213 SOLUTION TOPICAL at 05:12

## 2021-09-03 RX ADMIN — Medication 137 MICROGRAM(S): at 12:34

## 2021-09-03 RX ADMIN — CILOSTAZOL 100 MILLIGRAM(S): 100 TABLET ORAL at 17:11

## 2021-09-03 RX ADMIN — Medication 25 MILLIGRAM(S): at 06:56

## 2021-09-03 RX ADMIN — ENOXAPARIN SODIUM 40 MILLIGRAM(S): 100 INJECTION SUBCUTANEOUS at 12:04

## 2021-09-03 RX ADMIN — Medication 650 MILLIGRAM(S): at 22:00

## 2021-09-03 RX ADMIN — Medication 100 GRAM(S): at 02:25

## 2021-09-03 RX ADMIN — Medication 1 APPLICATION(S): at 12:22

## 2021-09-03 RX ADMIN — Medication 1 APPLICATION(S): at 05:13

## 2021-09-03 RX ADMIN — Medication 20 MILLIGRAM(S): at 01:44

## 2021-09-03 RX ADMIN — Medication 81 MILLIGRAM(S): at 12:04

## 2021-09-03 RX ADMIN — Medication 650 MILLIGRAM(S): at 21:18

## 2021-09-03 RX ADMIN — Medication 62.5 MILLIMOLE(S): at 01:45

## 2021-09-03 RX ADMIN — WARFARIN SODIUM 3 MILLIGRAM(S): 2.5 TABLET ORAL at 21:17

## 2021-09-03 RX ADMIN — MUPIROCIN 1 APPLICATION(S): 20 OINTMENT TOPICAL at 17:11

## 2021-09-03 RX ADMIN — Medication 50 MILLIEQUIVALENT(S): at 23:00

## 2021-09-03 RX ADMIN — POLYETHYLENE GLYCOL 3350 17 GRAM(S): 17 POWDER, FOR SOLUTION ORAL at 12:04

## 2021-09-03 NOTE — PROGRESS NOTE ADULT - SUBJECTIVE AND OBJECTIVE BOX
JANEE ARCEO  MRN-[id number]  Patient is a 76y old  Male who presents with a chief complaint of sob (03 Sep 2021 09:18)    HPI:  75 yo M    h/o  HTN   PPM, CAD.  LYMPHEDEMA. HTN      pw low back pain and SOB.      Patient states his low back pain began 5 days ago. He denies any trauma or falls.     States the pain was across his entire lower back. Denies  leg   numbness, tingling, leg weakness, urinary or bowl incontinence.      He states the pain has improved but states he is still using his walker to walk instead of his cane.     He states of  the past 4 days he has also noticed DUNCAN. States he is only able to walk short distances with becoming dyspnea  and has  leg swelling.     He denies fever, chills, cough, chest pain, orthopnea, PND.      He states he did not take his lasix for the past few days because it makes him urinate too much. (18 Aug 2021 19:37)      Surgery/Hospital Course:    admit sob, chf, R 1st toe wound    Ep inc back-up rate to 60, mult blood products ? TRALI, hypoxia    rapid Afib,  d/c'd, amio   LAsix drip d/d, Extubated          Today:   Patient was in rapid Afib overnight, was given one dose of dig and placed on IV Lopressor.  Lasix drip was d/c'd and switched to intermittent pushes as needed. Insulin drip was d/c'd at 6am. Will possibly d/c chest tubes today; plan to have pt out of bed to chair as tolerated.         ICU Vital Signs Last 24 Hrs  T(C): 37.7 (03 Sep 2021 08:00), Max: 37.8 (03 Sep 2021 00:00)  T(F): 99.9 (03 Sep 2021 08:00), Max: 100 (03 Sep 2021 00:00)  HR: 101 (03 Sep 2021 09:00) (83 - 135)  BP: --  BP(mean): --  ABP: 139/64 (03 Sep 2021 09:00) (106/53 - 310/310)  ABP(mean): 86 (03 Sep 2021 09:00) (62 - 310)  RR: 21 (03 Sep 2021 09:00) (11 - 29)  SpO2: 90% (03 Sep 2021 09:00) (90% - 100%)      Physical Exam:  Gen:  Awake, alert   CNS: non focal 	  Neck: no JVD  RES : clear , no wheezing              CVS: Regular  rhythm. Normal S1/S2  Abd: Soft, non-distended. Bowel sounds present.  Skin: No rash.  Ext:  no edema    ============================I/O===========================   I&O's Detail    02 Sep 2021 07:01  -  03 Sep 2021 07:00  --------------------------------------------------------  IN:    Dexmedetomidine: 7.2 mL    Insulin: 33 mL    IV PiggyBack: 450 mL    IV PiggyBack: 250 mL    sodium chloride 0.9%: 240 mL  Total IN: 980.2 mL    OUT:    Chest Tube (mL): 30 mL    Chest Tube (mL): 50 mL    Chest Tube (mL): 150 mL    Indwelling Catheter - Urethral (mL): 2465 mL    Vasopressin: 0 mL  Total OUT: 2695 mL    Total NET: -1714.8 mL      03 Sep 2021 07:01  -  03 Sep 2021 10:05  --------------------------------------------------------  IN:    sodium chloride 0.9%: 20 mL  Total IN: 20 mL    OUT:    Chest Tube (mL): 0 mL    Indwelling Catheter - Urethral (mL): 175 mL    Insulin: 0 mL  Total OUT: 175 mL    Total NET: -155 mL        ============================ LABS =========================                        8.1    11.40 )-----------( 115      ( 03 Sep 2021 00:25 )             25.1         144  |  109<H>  |  45<H>  ----------------------------<  154<H>  3.8   |  24  |  1.01    Ca    8.7      03 Sep 2021 00:25  Phos  2.0       Mg     2.6         TPro  6.0  /  Alb  3.8  /  TBili  0.7  /  DBili  x   /  AST  14  /  ALT  9<L>  /  AlkPhos  65      LIVER FUNCTIONS - ( 03 Sep 2021 00:25 )  Alb: 3.8 g/dL / Pro: 6.0 g/dL / ALK PHOS: 65 U/L / ALT: 9 U/L / AST: 14 U/L / GGT: x             ABG - ( 03 Sep 2021 05:20 )  pH, Arterial: 7.44  pH, Blood: x     /  pCO2: 43    /  pO2: 97    / HCO3: 29    / Base Excess: 4.6   /  SaO2: 99.2                ======================Micro/Rad/Cardio=================  Culture: Reviewed   CXR: Reviewed  Echo: Reviewed  ======================================================  PAST MEDICAL & SURGICAL HISTORY:  Afib  not on anticoagulation    CAD (coronary artery disease)    Varicose vein of leg    Hypothyroid    Pacemaker    H/O fracture of hip      H/O asbestosis    HTN (hypertension)    Pacemaker  tadoÂ°tronic - Model RVDR01 1/10/2012    Stented coronary artery  drug eluding stent 2007    H/O detached retina repair      S/P cataract surgery    History of hip surgery  left hip ORIF    H/O varicose vein stripping   left leg    History of left knee replacement   - multiple infections post op requiring reoperation in , , )    H/O foot surgery  for infection of right foot 2019    H/O inguinal hernia repair  right       ====================ASSESSMENT ==============    Hypovolemic shock  Post op respiratory insufficiency       Plan:  ====================== NEUROLOGY=====================  acetaminophen   Tablet .. 650 milliGRAM(s) Oral every 6 hours PRN Mild Pain (1 - 3)  escitalopram 5 milliGRAM(s) Oral daily    ==================== RESPIRATORY======================  Mechanical Ventilation:      ====================CARDIOVASCULAR==================  metoprolol tartrate 25 milliGRAM(s) Oral every 8 hours  aspirin  chewable 81 milliGRAM(s) Oral daily  atorvastatin 40 milliGRAM(s) Oral at bedtime  ===================HEMATOLOGIC/ONC ===================  Monitor H&H/Plts    cilostazol 100 milliGRAM(s) Oral every 12 hours  enoxaparin Injectable 40 milliGRAM(s) SubCutaneous daily    ===================== RENAL =========================  Continue monitoring urine output, I&OS, BUN/Cr     ==================== GASTROINTESTINAL===================  pantoprazole    Tablet 40 milliGRAM(s) Oral before breakfast  polyethylene glycol 3350 17 Gram(s) Oral daily  sodium chloride 0.9%. 1000 milliLiter(s) (10 mL/Hr) IV Continuous <Continuous>    =======================    ENDOCRINE  =====================  insulin lispro (ADMELOG) corrective regimen sliding scale   SubCutaneous three times a day with meals  insulin regular Infusion 3 Unit(s)/Hr (3 mL/Hr) IV Continuous <Continuous>  levothyroxine 137 MICROGram(s) Oral daily    ========================INFECTIOUS DISEASE================          I have spent 35 minutes providing acute care for this critically ill patient     Patient requires continuous monitoring with bedside rhythm monitoring, pulse ox monitoring, and intermittent blood gas analysis. Care plan discussed with ICU care team. Patient remained critical and at risk for life threatening decompensation.     By signing my name below, I, Monae Catalan, attest that this documentation has been prepared under the direction and in the presence of Gera Christie MD   Electronically signed: Alexandra Porter, 21 @ 10:05    I, Gera Christie, personally performed the services described in this documentation. all medical record entries made by the scribe were at my direction and in my presence. I have reviewed the chart and agree that the record reflects my personal performance and is accurate and complete  Electronically signed: Gera Christie MD        JNAEE ARCEO  MRN-[id number]  Patient is a 76y old  Male who presents with a chief complaint of sob (03 Sep 2021 09:18)    HPI:  75 yo M    h/o  HTN   PPM, CAD.  LYMPHEDEMA. HTN      pw low back pain and SOB.      Patient states his low back pain began 5 days ago. He denies any trauma or falls.     States the pain was across his entire lower back. Denies  leg   numbness, tingling, leg weakness, urinary or bowl incontinence.      He states the pain has improved but states he is still using his walker to walk instead of his cane.     He states of  the past 4 days he has also noticed DUNCAN. States he is only able to walk short distances with becoming dyspnea  and has  leg swelling.     He denies fever, chills, cough, chest pain, orthopnea, PND.      He states he did not take his lasix for the past few days because it makes him urinate too much. (18 Aug 2021 19:37)      Surgery/Hospital Course:    admit sob, chf, R 1st toe wound    Ep inc back-up rate to 60, mult blood products ? TRALI, hypoxia    rapid Afib,  d/c'd, amio   LAsix drip d/d, Extubated          Today:   Patient was in rapid Afib overnight, was given one dose of dig and placed on IV Lopressor.  Lasix drip was d/c'd and switched to intermittent pushes as needed. Insulin drip was d/c'd at 6am. Will possibly d/c chest tubes today; plan to have pt out of bed to chair as tolerated.         ICU Vital Signs Last 24 Hrs  T(C): 37.7 (03 Sep 2021 08:00), Max: 37.8 (03 Sep 2021 00:00)  T(F): 99.9 (03 Sep 2021 08:00), Max: 100 (03 Sep 2021 00:00)  HR: 101 (03 Sep 2021 09:00) (83 - 135)  BP: --  BP(mean): --  ABP: 139/64 (03 Sep 2021 09:00) (106/53 - 310/310)  ABP(mean): 86 (03 Sep 2021 09:00) (62 - 310)  RR: 21 (03 Sep 2021 09:00) (11 - 29)  SpO2: 90% (03 Sep 2021 09:00) (90% - 100%)      Physical Exam:  Gen:  Awake, alert   CNS: non focal 	  Neck: no JVD  RES : clear , no wheezing        Chest: +Chest tubes         CVS: Regular  rhythm. Normal S1/S2  Abd: Soft, non-distended. Bowel sounds present.  Skin: No rash.  Ext:  no edema    ============================I/O===========================   I&O's Detail    02 Sep 2021 07:01  -  03 Sep 2021 07:00  --------------------------------------------------------  IN:    Dexmedetomidine: 7.2 mL    Insulin: 33 mL    IV PiggyBack: 450 mL    IV PiggyBack: 250 mL    sodium chloride 0.9%: 240 mL  Total IN: 980.2 mL    OUT:    Chest Tube (mL): 30 mL    Chest Tube (mL): 50 mL    Chest Tube (mL): 150 mL    Indwelling Catheter - Urethral (mL): 2465 mL    Vasopressin: 0 mL  Total OUT: 2695 mL    Total NET: -1714.8 mL      03 Sep 2021 07:01  -  03 Sep 2021 10:05  --------------------------------------------------------  IN:    sodium chloride 0.9%: 20 mL  Total IN: 20 mL    OUT:    Chest Tube (mL): 0 mL    Indwelling Catheter - Urethral (mL): 175 mL    Insulin: 0 mL  Total OUT: 175 mL    Total NET: -155 mL        ============================ LABS =========================                        8.1    11.40 )-----------( 115      ( 03 Sep 2021 00:25 )             25.1         144  |  109<H>  |  45<H>  ----------------------------<  154<H>  3.8   |  24  |  1.01    Ca    8.7      03 Sep 2021 00:25  Phos  2.0       Mg     2.6         TPro  6.0  /  Alb  3.8  /  TBili  0.7  /  DBili  x   /  AST  14  /  ALT  9<L>  /  AlkPhos  65      LIVER FUNCTIONS - ( 03 Sep 2021 00:25 )  Alb: 3.8 g/dL / Pro: 6.0 g/dL / ALK PHOS: 65 U/L / ALT: 9 U/L / AST: 14 U/L / GGT: x             ABG - ( 03 Sep 2021 05:20 )  pH, Arterial: 7.44  pH, Blood: x     /  pCO2: 43    /  pO2: 97    / HCO3: 29    / Base Excess: 4.6   /  SaO2: 99.2                ======================Micro/Rad/Cardio=================  Culture: Reviewed   CXR: Reviewed  Echo: Reviewed  ======================================================  PAST MEDICAL & SURGICAL HISTORY:  Afib  not on anticoagulation    CAD (coronary artery disease)    Varicose vein of leg    Hypothyroid    Pacemaker    H/O fracture of hip      H/O asbestosis    HTN (hypertension)    Pacemaker  Medtronic - Model RVDR01 1/10/2012    Stented coronary artery  drug eluding stent 2007    H/O detached retina repair      S/P cataract surgery    History of hip surgery  left hip ORIF    H/O varicose vein stripping   left leg    History of left knee replacement   - multiple infections post op requiring reoperation in , , )    H/O foot surgery  for infection of right foot 2019    H/O inguinal hernia repair  right       ====================ASSESSMENT ==============  MVR (t), C2L POD #4   Hypotension  Hypovolemic shock  Post op respiratory insufficiency   Acute blood loss anemia s/p multiple blood products  Thrombocytopenia   A Fib  Stress Hyperglycemia       Plan:  ====================== NEUROLOGY=====================  Nonfocal, continue to monitor neuro status per ICU protocol.   Tylenol PRN for analgesia   C/w Lexapro     acetaminophen   Tablet .. 650 milliGRAM(s) Oral every 6 hours PRN Mild Pain (1 - 3)  escitalopram 5 milliGRAM(s) Oral daily    ==================== RESPIRATORY======================  Receiving supplemental O2 therapy with 6L NC  Continue to monitor RR, breathing pattern, pulse ox, and ABG's.  Encourage incentive spirometry.   ====================CARDIOVASCULAR==================  MVR (t), C2L   Patient was in rapid Afib overnight, was given one dose of dig and placed on IV Lopressor.  Continue with ASA and Lipitor for graft patency.   Invasive hemodynamic monitoring, assess perfusion indices.   Medtronic PPM'12     metoprolol tartrate 25 milliGRAM(s) Oral every 8 hours  aspirin  chewable 81 milliGRAM(s) Oral daily  atorvastatin 40 milliGRAM(s) Oral at bedtime  ===================HEMATOLOGIC/ONC ===================  Acute blood loss Anemia s/p multiple blood products intraoperatively and in CTU   Thrombocytopenia   Monitor H&H/Plts    Continue enoxaparin and Pletal for venous thromboembolism prophylaxis.     cilostazol 100 milliGRAM(s) Oral every 12 hours  enoxaparin Injectable 40 milliGRAM(s) SubCutaneous daily  ===================== RENAL =========================  Lasix drip was d/c'd and switched to intermittent pushes as needed.   Continue to monitor I/Os, BUN/Creatinine, and urine output.   Goal net negative fluid balance. Replete lytes PRN. Keep K> 4 and Mg >2.   ==================== GASTROINTESTINAL===================  Tolerating PO consistent carb diet.   Continue Protonix for stress ulcer prophylaxis.   Bowel regimen with Miralax.     pantoprazole    Tablet 40 milliGRAM(s) Oral before breakfast  polyethylene glycol 3350 17 Gram(s) Oral daily  sodium chloride 0.9%. 1000 milliLiter(s) (10 mL/Hr) IV Continuous <Continuous>  =======================    ENDOCRINE  =====================  Stress Hyperglycemia, Glycemic control w/ Admelog SS.  Insulin drip was d/c'd at 6am.   Synthroid for hypothyroidism     insulin lispro (ADMELOG) corrective regimen sliding scale   SubCutaneous three times a day with meals  insulin regular Infusion 3 Unit(s)/Hr (3 mL/Hr) IV Continuous <Continuous>  levothyroxine 137 MICROGram(s) Oral daily  ========================INFECTIOUS DISEASE================  Afebrile, WBC within normal limits.   Monitor temperature and trend WBC. Monitor off abx.       I have spent 35 minutes providing acute care for this critically ill patient     Patient requires continuous monitoring with bedside rhythm monitoring, pulse ox monitoring, and intermittent blood gas analysis. Care plan discussed with ICU care team. Patient remained critical and at risk for life threatening decompensation.     By signing my name below, I, Monae Catalan, attest that this documentation has been prepared under the direction and in the presence of Gera Christie MD   Electronically signed: Alexandra Porter, 21 @ 10:05    IGera, personally performed the services described in this documentation. all medical record entries made by the scribe were at my direction and in my presence. I have reviewed the chart and agree that the record reflects my personal performance and is accurate and complete  Electronically signed: Gera Christie MD

## 2021-09-03 NOTE — PROGRESS NOTE ADULT - SUBJECTIVE AND OBJECTIVE BOX
INTERVAL HPI/OVERNIGHT EVENTS:  s/p CABG/MVR, pt extubated, events noted  pt doing better, hgb stale      MEDICATIONS  (PRN):      Allergies    alfuzosin (Hives)  levofloxacin (Hives)  Myrbetriq (Hives)  tamsulosin (Hives)    Intolerances      ROS   unable to obtain         PHYSICAL EXAM:   Vital Signs:  Vital Signs Last 24 Hrs  T(C): 37.1 (29 Aug 2021 04:21), Max: 37.1 (29 Aug 2021 04:21)  T(F): 98.7 (29 Aug 2021 04:21), Max: 98.7 (29 Aug 2021 04:21)  HR: 55 (29 Aug 2021 04:21) (55 - 78)  BP: 116/61 (29 Aug 2021 04:21) (116/61 - 146/63)  BP(mean): --  RR: 18 (29 Aug 2021 04:21) (18 - 18)  SpO2: 93% (29 Aug 2021 04:21) (93% - 96%)  Daily     Daily Weight in k.2 (29 Aug 2021 07:30)I&O's Summary    28 Aug 2021 07:  -  29 Aug 2021 07:00  --------------------------------------------------------  IN: 1110 mL / OUT: 5 mL / NET: -915 mL    29 Aug 2021 07:01  -  29 Aug 2021 10:27  --------------------------------------------------------  IN: 240 mL / OUT: 800 mL / NET: -560 mL        GENERAL:  Appears stated age, well-groomed, well-nourished, no distress  HEENT:  NC/AT,  conjunctivae clear and pink, no thyromegaly, nodules, adenopathy, no JVD, sclera -anicteric  CHEST:  Full & symmetric excursion, no increased effort, breath sounds clear  HEART:  Regular rhythm, S1, S2, no murmur/rub/S3/S4, no abdominal bruit, no edema  ABDOMEN:  Soft, non-tender, non-distended, normoactive bowel sounds,  no masses ,no hepato-splenomegaly, no signs of chronic liver disease  EXTEREMITIES:  no cyanosis,clubbing or edema  SKIN:  No rash/erythema/ecchymoses/petechiae/wounds/abscess/warm/dry  NEURO:  Alert, oriented, no asterixis, no tremor, no encephalopathy      LABS:                        11.5   5.65  )-----------( 138      ( 29 Aug 2021 06:30 )             36.1     08    139  |  105  |  44<H>  ----------------------------<  113<H>  4.5   |  21<L>  |  1.06    Ca    9.8      29 Aug 2021 06:30          amylase   lipase  RADIOLOGY & ADDITIONAL TESTS:

## 2021-09-03 NOTE — OCCUPATIONAL THERAPY INITIAL EVALUATION ADULT - MANUAL MUSCLE TESTING RESULTS, REHAB EVAL
due to sternal precautions, at least 3/5 BUE and RLE. LLE limited due to stuck in extension/grossly assessed due to

## 2021-09-03 NOTE — OCCUPATIONAL THERAPY INITIAL EVALUATION ADULT - DIAGNOSIS, OT EVAL
Pt presents with sternal precautions, impaired mobility, decreased balance, generalized deconditioning.

## 2021-09-03 NOTE — PROGRESS NOTE ADULT - SUBJECTIVE AND OBJECTIVE BOX
CARDIOLOGY     PROGRESS  NOTE   ________________________________________________    CHIEF COMPLAINT:Patient is a 76y old  Male who presents with a chief complaint of sob (02 Sep 2021 11:57)  doing better.  	  REVIEW OF SYSTEMS:  CONSTITUTIONAL: No fever, weight loss, or fatigue  EYES: No eye pain, visual disturbances, or discharge  ENT:  No difficulty hearing, tinnitus, vertigo; No sinus or throat pain  NECK: No pain or stiffness  RESPIRATORY: No cough, wheezing, chills or hemoptysis; No Shortness of Breath  CARDIOVASCULAR: No chest pain, palpitations, passing out, dizziness, or leg swelling  GASTROINTESTINAL: No abdominal or epigastric pain. No nausea, vomiting, or hematemesis; No diarrhea or constipation. No melena or hematochezia.  GENITOURINARY: No dysuria, frequency, hematuria, or incontinence  NEUROLOGICAL: No headaches, memory loss, loss of strength, numbness, or tremors  SKIN: No itching, burning, rashes, or lesions   LYMPH Nodes: No enlarged glands  ENDOCRINE: No heat or cold intolerance; No hair loss  MUSCULOSKELETAL: No joint pain or swelling; No muscle, back, or extremity pain  PSYCHIATRIC: No depression, anxiety, mood swings, or difficulty sleeping  HEME/LYMPH: No easy bruising, or bleeding gums  ALLERGY AND IMMUNOLOGIC: No hives or eczema	    [ ] All others negative	  [ ] Unable to obtain    PHYSICAL EXAM:  T(C): 37.7 (09-03-21 @ 08:00), Max: 37.8 (09-03-21 @ 00:00)  HR: 101 (09-03-21 @ 09:00) (81 - 135)  BP: --  RR: 21 (09-03-21 @ 09:00) (11 - 29)  SpO2: 90% (09-03-21 @ 09:00) (90% - 100%)  Wt(kg): --  I&O's Summary    02 Sep 2021 07:01  -  03 Sep 2021 07:00  --------------------------------------------------------  IN: 980.2 mL / OUT: 2695 mL / NET: -1714.8 mL    03 Sep 2021 07:01  -  03 Sep 2021 09:18  --------------------------------------------------------  IN: 20 mL / OUT: 175 mL / NET: -155 mL        Appearance: Normal	  HEENT:   Normal oral mucosa, PERRL, EOMI	  Lymphatic: No lymphadenopathy  Cardiovascular: Normal S1 S2, No JVD, No murmurs, No edema  Respiratory: Lungs clear to auscultation	  Psychiatry: A & O x 3, Mood & affect appropriate  Gastrointestinal:  Soft, Non-tender, + BS	  Skin: No rashes, No ecchymoses, No cyanosis	  Neurologic: Non-focal  Extremities: Normal range of motion, No clubbing, cyanosis or edema  Vascular: Peripheral pulses palpable 2+ bilaterally    MEDICATIONS  (STANDING):  aspirin  chewable 81 milliGRAM(s) Oral daily  atorvastatin 40 milliGRAM(s) Oral at bedtime  chlorhexidine 2% Cloths 1 Application(s) Topical <User Schedule>  clotrimazole 1% Cream 1 Application(s) Topical two times a day  enoxaparin Injectable 40 milliGRAM(s) SubCutaneous daily  hydrocortisone 1% Cream 1 Application(s) Topical daily  insulin lispro (ADMELOG) corrective regimen sliding scale   SubCutaneous three times a day with meals  insulin regular Infusion 3 Unit(s)/Hr (3 mL/Hr) IV Continuous <Continuous>  levothyroxine 137 MICROGram(s) Oral daily  metoprolol tartrate 25 milliGRAM(s) Oral every 8 hours  mupirocin 2% Ointment 1 Application(s) Topical two times a day  pantoprazole    Tablet 40 milliGRAM(s) Oral before breakfast  polyethylene glycol 3350 17 Gram(s) Oral daily  sodium chloride 0.9%. 1000 milliLiter(s) (10 mL/Hr) IV Continuous <Continuous>      TELEMETRY: 	    ECG:  	  RADIOLOGY:  OTHER: 	  	  LABS:	 	    CARDIAC MARKERS:                                8.1    11.40 )-----------( 115      ( 03 Sep 2021 00:25 )             25.1     09-03    144  |  109<H>  |  45<H>  ----------------------------<  154<H>  3.8   |  24  |  1.01    Ca    8.7      03 Sep 2021 00:25  Phos  2.0     09-03  Mg     2.6     09-03    TPro  6.0  /  Alb  3.8  /  TBili  0.7  /  DBili  x   /  AST  14  /  ALT  9<L>  /  AlkPhos  65  09-03    proBNP: Serum Pro-Brain Natriuretic Peptide: 1881 pg/mL (08-18 @ 16:10)    Lipid Profile:   HgA1c:   TSH: Thyroid Stimulating Hormone, Serum: 5.02 uIU/mL (08-25 @ 22:25)          Assessment and plan  ---------------------------      	                    CARDIOLOGY     PROGRESS  NOTE   ________________________________________________    CHIEF COMPLAINT:Patient is a 76y old  Male who presents with a chief complaint of sob (02 Sep 2021 11:57)  doing better, awake and alert.  	  REVIEW OF SYSTEMS:  CONSTITUTIONAL: No fever, weight loss, or fatigue  EYES: No eye pain, visual disturbances, or discharge  ENT:  No difficulty hearing, tinnitus, vertigo; No sinus or throat pain  NECK: No pain or stiffness  RESPIRATORY: No cough, wheezing, chills or hemoptysis; No Shortness of Breath  CARDIOVASCULAR: No chest pain, palpitations, passing out, dizziness, or leg swelling  GASTROINTESTINAL: No abdominal or epigastric pain. No nausea, vomiting, or hematemesis; No diarrhea or constipation. No melena or hematochezia.  GENITOURINARY: No dysuria, frequency, hematuria, or incontinence  NEUROLOGICAL: No headaches, memory loss, loss of strength, numbness, or tremors  SKIN: No itching, burning, rashes, or lesions   LYMPH Nodes: No enlarged glands  ENDOCRINE: No heat or cold intolerance; No hair loss  MUSCULOSKELETAL: No joint pain or swelling; No muscle, back, or extremity pain  PSYCHIATRIC: No depression, anxiety, mood swings, or difficulty sleeping  HEME/LYMPH: No easy bruising, or bleeding gums  ALLERGY AND IMMUNOLOGIC: No hives or eczema	    [ ] All others negative	  [ ] Unable to obtain    PHYSICAL EXAM:  T(C): 37.7 (09-03-21 @ 08:00), Max: 37.8 (09-03-21 @ 00:00)  HR: 101 (09-03-21 @ 09:00) (81 - 135)  BP: --  RR: 21 (09-03-21 @ 09:00) (11 - 29)  SpO2: 90% (09-03-21 @ 09:00) (90% - 100%)  Wt(kg): --  I&O's Summary    02 Sep 2021 07:01  -  03 Sep 2021 07:00  --------------------------------------------------------  IN: 980.2 mL / OUT: 2695 mL / NET: -1714.8 mL    03 Sep 2021 07:01  -  03 Sep 2021 09:18  --------------------------------------------------------  IN: 20 mL / OUT: 175 mL / NET: -155 mL        Appearance: Normal	  HEENT:   Normal oral mucosa, PERRL, EOMI	  Lymphatic: No lymphadenopathy  Cardiovascular: Normal S1 S2, No JVD, + murmurs, No edema  Respiratory: Lungs clear to auscultation	  Psychiatry: A & O x 3, Mood & affect appropriate  Gastrointestinal:  Soft, Non-tender, + BS	  Skin: No rashes, No ecchymoses, No cyanosis	  Neurologic: Non-focal  Extremities: Normal range of motion, No clubbing, cyanosis or edema  Vascular: Peripheral pulses palpable 2+ bilaterally    MEDICATIONS  (STANDING):  aspirin  chewable 81 milliGRAM(s) Oral daily  atorvastatin 40 milliGRAM(s) Oral at bedtime  chlorhexidine 2% Cloths 1 Application(s) Topical <User Schedule>  clotrimazole 1% Cream 1 Application(s) Topical two times a day  enoxaparin Injectable 40 milliGRAM(s) SubCutaneous daily  hydrocortisone 1% Cream 1 Application(s) Topical daily  insulin lispro (ADMELOG) corrective regimen sliding scale   SubCutaneous three times a day with meals  insulin regular Infusion 3 Unit(s)/Hr (3 mL/Hr) IV Continuous <Continuous>  levothyroxine 137 MICROGram(s) Oral daily  metoprolol tartrate 25 milliGRAM(s) Oral every 8 hours  mupirocin 2% Ointment 1 Application(s) Topical two times a day  pantoprazole    Tablet 40 milliGRAM(s) Oral before breakfast  polyethylene glycol 3350 17 Gram(s) Oral daily  sodium chloride 0.9%. 1000 milliLiter(s) (10 mL/Hr) IV Continuous <Continuous>      TELEMETRY: 	    ECG:  	  RADIOLOGY:  OTHER: 	  	  LABS:	 	    CARDIAC MARKERS:                                8.1    11.40 )-----------( 115      ( 03 Sep 2021 00:25 )             25.1     09-03    144  |  109<H>  |  45<H>  ----------------------------<  154<H>  3.8   |  24  |  1.01    Ca    8.7      03 Sep 2021 00:25  Phos  2.0     09-03  Mg     2.6     09-03    TPro  6.0  /  Alb  3.8  /  TBili  0.7  /  DBili  x   /  AST  14  /  ALT  9<L>  /  AlkPhos  65  09-03    proBNP: Serum Pro-Brain Natriuretic Peptide: 1881 pg/mL (08-18 @ 16:10)    Lipid Profile:   HgA1c:   TSH: Thyroid Stimulating Hormone, Serum: 5.02 uIU/mL (08-25 @ 22:25)    < from: Xray Chest 1 View- PORTABLE-Routine (Xray Chest 1 View- PORTABLE-Routine in AM.) (09.03.21 @ 01:45) >  New focal opacity in the left midlung could represent developing pneumonia or atelectasis. Correlate clinically.    Redemonstration of opacity in the left lower lung likely representing small pleural effusion with surrounding passive atelectasis.        Assessment and plan  ---------------------------  s/p CABG 2 vessels and MVR/ bio  post op bleeding, s/p blood transfusion  extubated  hgb stable  ppm noted hr has decreased to hr  to 600 jose 120 AAAIR/ DDDR  s/p extubation  ?AC post MVR as per ct surgery  need to change PPM  battery prior to Discharge  oob to  chair  continue beta blocker, amio dc d

## 2021-09-04 LAB
ALBUMIN SERPL ELPH-MCNC: 3.4 G/DL — SIGNIFICANT CHANGE UP (ref 3.3–5)
ALP SERPL-CCNC: 76 U/L — SIGNIFICANT CHANGE UP (ref 40–120)
ALT FLD-CCNC: 9 U/L — LOW (ref 10–45)
ANION GAP SERPL CALC-SCNC: 8 MMOL/L — SIGNIFICANT CHANGE UP (ref 5–17)
AST SERPL-CCNC: 15 U/L — SIGNIFICANT CHANGE UP (ref 10–40)
BILIRUB SERPL-MCNC: 0.6 MG/DL — SIGNIFICANT CHANGE UP (ref 0.2–1.2)
BUN SERPL-MCNC: 36 MG/DL — HIGH (ref 7–23)
CALCIUM SERPL-MCNC: 9 MG/DL — SIGNIFICANT CHANGE UP (ref 8.4–10.5)
CHLORIDE SERPL-SCNC: 107 MMOL/L — SIGNIFICANT CHANGE UP (ref 96–108)
CO2 SERPL-SCNC: 25 MMOL/L — SIGNIFICANT CHANGE UP (ref 22–31)
CREAT SERPL-MCNC: 0.86 MG/DL — SIGNIFICANT CHANGE UP (ref 0.5–1.3)
GAS PNL BLDA: SIGNIFICANT CHANGE UP
GLUCOSE BLDC GLUCOMTR-MCNC: 124 MG/DL — HIGH (ref 70–99)
GLUCOSE BLDC GLUCOMTR-MCNC: 137 MG/DL — HIGH (ref 70–99)
GLUCOSE BLDC GLUCOMTR-MCNC: 138 MG/DL — HIGH (ref 70–99)
GLUCOSE SERPL-MCNC: 134 MG/DL — HIGH (ref 70–99)
HCT VFR BLD CALC: 24.9 % — LOW (ref 39–50)
HGB BLD-MCNC: 8 G/DL — LOW (ref 13–17)
INR BLD: 1.14 RATIO — SIGNIFICANT CHANGE UP (ref 0.88–1.16)
MAGNESIUM SERPL-MCNC: 2.1 MG/DL — SIGNIFICANT CHANGE UP (ref 1.6–2.6)
MCHC RBC-ENTMCNC: 29.4 PG — SIGNIFICANT CHANGE UP (ref 27–34)
MCHC RBC-ENTMCNC: 32.1 GM/DL — SIGNIFICANT CHANGE UP (ref 32–36)
MCV RBC AUTO: 91.5 FL — SIGNIFICANT CHANGE UP (ref 80–100)
NRBC # BLD: 0 /100 WBCS — SIGNIFICANT CHANGE UP (ref 0–0)
PHOSPHATE SERPL-MCNC: 2.1 MG/DL — LOW (ref 2.5–4.5)
PLATELET # BLD AUTO: 112 K/UL — LOW (ref 150–400)
POTASSIUM SERPL-MCNC: 4.4 MMOL/L — SIGNIFICANT CHANGE UP (ref 3.5–5.3)
POTASSIUM SERPL-SCNC: 4.4 MMOL/L — SIGNIFICANT CHANGE UP (ref 3.5–5.3)
PROT SERPL-MCNC: 5.8 G/DL — LOW (ref 6–8.3)
PROTHROM AB SERPL-ACNC: 13.6 SEC — SIGNIFICANT CHANGE UP (ref 10.6–13.6)
RBC # BLD: 2.72 M/UL — LOW (ref 4.2–5.8)
RBC # FLD: 14.7 % — HIGH (ref 10.3–14.5)
SODIUM SERPL-SCNC: 140 MMOL/L — SIGNIFICANT CHANGE UP (ref 135–145)
WBC # BLD: 8.95 K/UL — SIGNIFICANT CHANGE UP (ref 3.8–10.5)
WBC # FLD AUTO: 8.95 K/UL — SIGNIFICANT CHANGE UP (ref 3.8–10.5)

## 2021-09-04 PROCEDURE — 71045 X-RAY EXAM CHEST 1 VIEW: CPT | Mod: 26

## 2021-09-04 PROCEDURE — 99291 CRITICAL CARE FIRST HOUR: CPT

## 2021-09-04 RX ORDER — POTASSIUM CHLORIDE 20 MEQ
10 PACKET (EA) ORAL ONCE
Refills: 0 | Status: COMPLETED | OUTPATIENT
Start: 2021-09-04 | End: 2021-09-04

## 2021-09-04 RX ORDER — WARFARIN SODIUM 2.5 MG/1
5 TABLET ORAL ONCE
Refills: 0 | Status: COMPLETED | OUTPATIENT
Start: 2021-09-04 | End: 2021-09-04

## 2021-09-04 RX ADMIN — WARFARIN SODIUM 5 MILLIGRAM(S): 2.5 TABLET ORAL at 21:42

## 2021-09-04 RX ADMIN — Medication 1 APPLICATION(S): at 06:26

## 2021-09-04 RX ADMIN — ATORVASTATIN CALCIUM 40 MILLIGRAM(S): 80 TABLET, FILM COATED ORAL at 21:42

## 2021-09-04 RX ADMIN — Medication 1 APPLICATION(S): at 17:21

## 2021-09-04 RX ADMIN — Medication 50 MILLIEQUIVALENT(S): at 12:42

## 2021-09-04 RX ADMIN — Medication 50 MILLIEQUIVALENT(S): at 12:01

## 2021-09-04 RX ADMIN — Medication 83.33 MILLIMOLE(S): at 02:12

## 2021-09-04 RX ADMIN — MUPIROCIN 1 APPLICATION(S): 20 OINTMENT TOPICAL at 17:21

## 2021-09-04 RX ADMIN — CHLORHEXIDINE GLUCONATE 1 APPLICATION(S): 213 SOLUTION TOPICAL at 06:31

## 2021-09-04 RX ADMIN — Medication 25 MILLIGRAM(S): at 06:26

## 2021-09-04 RX ADMIN — MUPIROCIN 1 APPLICATION(S): 20 OINTMENT TOPICAL at 06:26

## 2021-09-04 RX ADMIN — Medication 50 MILLIEQUIVALENT(S): at 13:10

## 2021-09-04 RX ADMIN — PANTOPRAZOLE SODIUM 40 MILLIGRAM(S): 20 TABLET, DELAYED RELEASE ORAL at 06:27

## 2021-09-04 RX ADMIN — POLYETHYLENE GLYCOL 3350 17 GRAM(S): 17 POWDER, FOR SOLUTION ORAL at 11:10

## 2021-09-04 RX ADMIN — ESCITALOPRAM OXALATE 5 MILLIGRAM(S): 10 TABLET, FILM COATED ORAL at 11:10

## 2021-09-04 RX ADMIN — Medication 137 MICROGRAM(S): at 06:26

## 2021-09-04 RX ADMIN — ENOXAPARIN SODIUM 40 MILLIGRAM(S): 100 INJECTION SUBCUTANEOUS at 11:09

## 2021-09-04 RX ADMIN — Medication 25 MILLIGRAM(S): at 13:10

## 2021-09-04 RX ADMIN — Medication 1 APPLICATION(S): at 12:00

## 2021-09-04 RX ADMIN — Medication 81 MILLIGRAM(S): at 11:09

## 2021-09-04 RX ADMIN — CILOSTAZOL 100 MILLIGRAM(S): 100 TABLET ORAL at 17:20

## 2021-09-04 RX ADMIN — Medication 25 MILLIGRAM(S): at 21:42

## 2021-09-04 RX ADMIN — CILOSTAZOL 100 MILLIGRAM(S): 100 TABLET ORAL at 06:25

## 2021-09-04 NOTE — PROGRESS NOTE ADULT - SUBJECTIVE AND OBJECTIVE BOX
JANEE ARCEO  MRN-[id number]  Patient is a 76y old  Male who presents with a chief complaint of sob (03 Sep 2021 09:18)    HPI:  75 yo M    h/o  HTN   PPM, CAD.  LYMPHEDEMA. HTN      pw low back pain and SOB.      Patient states his low back pain began 5 days ago. He denies any trauma or falls.     States the pain was across his entire lower back. Denies  leg   numbness, tingling, leg weakness, urinary or bowl incontinence.      He states the pain has improved but states he is still using his walker to walk instead of his cane.     He states of  the past 4 days he has also noticed DUNCAN. States he is only able to walk short distances with becoming dyspnea  and has  leg swelling.     He denies fever, chills, cough, chest pain, orthopnea, PND.      He states he did not take his lasix for the past few days because it makes him urinate too much. (18 Aug 2021 19:37)      Surgery/Hospital Course:    admit sob, chf, R 1st toe wound    Ep inc back-up rate to 60, mult blood products ? TRALI, hypoxia    rapid Afib,  d/c'd, amio   LAsix drip d/d, Extubated          Today:   Patient was in rapid Afib overnight, was given one dose of dig and placed on IV Lopressor.  Lasix drip was d/c'd and switched to intermittent pushes as needed. Insulin drip was d/c'd at 6am. Will possibly d/c chest tubes today; plan to have pt out of bed to chair as tolerated.       ICU Vital Signs Last 24 Hrs  T(C): 36.7 (04 Sep 2021 08:00), Max: 37.6 (03 Sep 2021 12:00)  T(F): 98.1 (04 Sep 2021 08:00), Max: 99.7 (03 Sep 2021 12:00)  HR: 89 (04 Sep 2021 08:00) (80 - 106)  BP: --  BP(mean): --  ABP: 140/57 (04 Sep 2021 08:00) (111/46 - 168/78)  ABP(mean): 79 (04 Sep 2021 08:00) (65 - 104)  RR: 20 (04 Sep 2021 08:00) (10 - 26)  SpO2: 96% (04 Sep 2021 08:00) (89% - 99%)      Physical Exam:  Gen:  Awake, alert   CNS: non focal 	  Neck: no JVD  RES : clear , no wheezing        Chest: +Chest tubes         CVS: Regular  rhythm. Normal S1/S2  Abd: Soft, non-distended. Bowel sounds present.  Skin: No rash.  Ext:  no edema    ============================I/O===========================  I&O's Detail    03 Sep 2021 07:01  -  04 Sep 2021 07:00  --------------------------------------------------------  IN:    IV PiggyBack: 100 mL    IV PiggyBack: 416.5 mL    Oral Fluid: 400 mL    sodium chloride 0.9%: 220 mL  Total IN: 1136.5 mL    OUT:    Chest Tube (mL): 10 mL    Indwelling Catheter - Urethral (mL): 1465 mL    Insulin: 0 mL  Total OUT: 1475 mL    Total NET: -338.5 mL      04 Sep 2021 07:01  -  04 Sep 2021 08:36  --------------------------------------------------------  IN:    IV PiggyBack: 83.3 mL    Oral Fluid: 180 mL    sodium chloride 0.9%: 10 mL  Total IN: 273.3 mL    OUT:    Indwelling Catheter - Urethral (mL): 115 mL  Total OUT: 115 mL    Total NET: 158.3 mL    ============================ LABS =========================                        8.0    8.95  )-----------( 112      ( 04 Sep 2021 00:12 )             24.9   09-04    140  |  107  |  36<H>  ----------------------------<  134<H>  4.4   |  25  |  0.86    Ca    9.0      04 Sep 2021 00:12  Phos  2.1     -  Mg     2.1         TPro  5.8<L>  /  Alb  3.4  /  TBili  0.6  /  DBili  x   /  AST  15  /  ALT  9<L>  /  AlkPhos  76        ======================Micro/Rad/Cardio=================  Culture: Reviewed   CXR: Reviewed  Echo: Reviewed  ======================================================  PAST MEDICAL & SURGICAL HISTORY:  Afib  not on anticoagulation    CAD (coronary artery disease)    Varicose vein of leg    Hypothyroid    Pacemaker    H/O fracture of hip      H/O asbestosis    HTN (hypertension)    Pacemaker  [employer] - Model RVDR01 1/10/2012    Stented coronary artery  drug eluding stent 2007    H/O detached retina repair      S/P cataract surgery    History of hip surgery  left hip ORIF    H/O varicose vein stripping   left leg    History of left knee replacement   - multiple infections post op requiring reoperation in , , )    H/O foot surgery  for infection of right foot     H/O inguinal hernia repair  right       ====================ASSESSMENT ==============  MVR (t), C2L POD #4   Hypotension  Hypovolemic shock  Post op respiratory insufficiency   Acute blood loss anemia s/p multiple blood products  Thrombocytopenia   A Fib  Stress Hyperglycemia       Plan:  ====================== NEUROLOGY=====================  Nonfocal, continue to monitor neuro status per ICU protocol.   Tylenol PRN for analgesia   C/w Lexapro     acetaminophen   Tablet .. 650 milliGRAM(s) Oral every 6 hours PRN Mild Pain (1 - 3)  escitalopram 5 milliGRAM(s) Oral daily    ==================== RESPIRATORY======================  Receiving supplemental O2 therapy with 6L NC  Continue to monitor RR, breathing pattern, pulse ox, and ABG's.  Encourage incentive spirometry.   ====================CARDIOVASCULAR==================  MVR (t), C2L   Patient was in rapid Afib overnight, was given one dose of dig and placed on IV Lopressor.  Continue with ASA and Lipitor for graft patency.   Invasive hemodynamic monitoring, assess perfusion indices.   Ischemia Care PPM'12     metoprolol tartrate 25 milliGRAM(s) Oral every 8 hours  aspirin  chewable 81 milliGRAM(s) Oral daily  atorvastatin 40 milliGRAM(s) Oral at bedtime  ===================HEMATOLOGIC/ONC ===================  Acute blood loss Anemia s/p multiple blood products intraoperatively and in CTU   Thrombocytopenia   Monitor H&H/Plts    Continue enoxaparin and Pletal for venous thromboembolism prophylaxis.     cilostazol 100 milliGRAM(s) Oral every 12 hours  enoxaparin Injectable 40 milliGRAM(s) SubCutaneous daily  ===================== RENAL =========================  Lasix drip was d/c'd and switched to intermittent pushes as needed.   Continue to monitor I/Os, BUN/Creatinine, and urine output.   Goal net negative fluid balance. Replete lytes PRN. Keep K> 4 and Mg >2.   ==================== GASTROINTESTINAL===================  Tolerating PO consistent carb diet.   Continue Protonix for stress ulcer prophylaxis.   Bowel regimen with Miralax.     pantoprazole    Tablet 40 milliGRAM(s) Oral before breakfast  polyethylene glycol 3350 17 Gram(s) Oral daily  sodium chloride 0.9%. 1000 milliLiter(s) (10 mL/Hr) IV Continuous <Continuous>  =======================    ENDOCRINE  =====================  Stress Hyperglycemia, Glycemic control w/ Admelog SS.  Insulin drip was d/c'd at 6am.   Synthroid for hypothyroidism     insulin lispro (ADMELOG) corrective regimen sliding scale   SubCutaneous three times a day with meals  insulin regular Infusion 3 Unit(s)/Hr (3 mL/Hr) IV Continuous <Continuous>  levothyroxine 137 MICROGram(s) Oral daily  ========================INFECTIOUS DISEASE================  Afebrile, WBC within normal limits.   Monitor temperature and trend WBC. Monitor off abx.       I have spent 35 minutes providing acute care for this critically ill patient     Patient requires continuous monitoring with bedside rhythm monitoring, pulse ox monitoring, and intermittent blood gas analysis. Care plan discussed with ICU care team. Patient remained critical and at risk for life threatening decompensation.

## 2021-09-04 NOTE — PROGRESS NOTE ADULT - SUBJECTIVE AND OBJECTIVE BOX
CARDIOLOGY     PROGRESS  NOTE   ________________________________________________    CHIEF COMPLAINT:Patient is a 76y old  Male who presents with a chief complaint of sob (04 Sep 2021 08:34)  no complain.  	  REVIEW OF SYSTEMS:  CONSTITUTIONAL: No fever, weight loss, or fatigue  EYES: No eye pain, visual disturbances, or discharge  ENT:  No difficulty hearing, tinnitus, vertigo; No sinus or throat pain  NECK: No pain or stiffness  RESPIRATORY: No cough, wheezing, chills or hemoptysis; No Shortness of Breath  CARDIOVASCULAR: No chest pain, palpitations, passing out, dizziness, or leg swelling  GASTROINTESTINAL: No abdominal or epigastric pain. No nausea, vomiting, or hematemesis; No diarrhea or constipation. No melena or hematochezia.  GENITOURINARY: No dysuria, frequency, hematuria, or incontinence  NEUROLOGICAL: No headaches, memory loss, loss of strength, numbness, or tremors  SKIN: No itching, burning, rashes, or lesions   LYMPH Nodes: No enlarged glands  ENDOCRINE: No heat or cold intolerance; No hair loss  MUSCULOSKELETAL: No joint pain or swelling; No muscle, back, or extremity pain  PSYCHIATRIC: No depression, anxiety, mood swings, or difficulty sleeping  HEME/LYMPH: No easy bruising, or bleeding gums  ALLERGY AND IMMUNOLOGIC: No hives or eczema	    [ ] All others negative	  [ ] Unable to obtain    PHYSICAL EXAM:  T(C): 36.7 (09-04-21 @ 08:00), Max: 37.6 (09-03-21 @ 12:00)  HR: 96 (09-04-21 @ 10:00) (80 - 106)  BP: --  RR: 16 (09-04-21 @ 10:00) (10 - 26)  SpO2: 95% (09-04-21 @ 10:00) (89% - 99%)  Wt(kg): --  I&O's Summary    03 Sep 2021 07:01  -  04 Sep 2021 07:00  --------------------------------------------------------  IN: 1136.5 mL / OUT: 1475 mL / NET: -338.5 mL    04 Sep 2021 07:01  -  04 Sep 2021 11:38  --------------------------------------------------------  IN: 293.3 mL / OUT: 210 mL / NET: 83.3 mL        Appearance: Normal	  HEENT:   Normal oral mucosa, PERRL, EOMI	  Lymphatic: No lymphadenopathy  Cardiovascular: Normal S1 S2, No JVD, + murmurs, No edema  Respiratory: Lungs clear to auscultation	  Psychiatry: A & O x 3, Mood & affect appropriate  Gastrointestinal:  Soft, Non-tender, + BS	  Skin: No rashes, No ecchymoses, No cyanosis	  Neurologic: Non-focal  Extremities: Normal range of motion, No clubbing, cyanosis or edema  Vascular: Peripheral pulses palpable 2+ bilaterally    MEDICATIONS  (STANDING):  aspirin  chewable 81 milliGRAM(s) Oral daily  atorvastatin 40 milliGRAM(s) Oral at bedtime  chlorhexidine 2% Cloths 1 Application(s) Topical <User Schedule>  cilostazol 100 milliGRAM(s) Oral every 12 hours  clotrimazole 1% Cream 1 Application(s) Topical two times a day  enoxaparin Injectable 40 milliGRAM(s) SubCutaneous daily  escitalopram 5 milliGRAM(s) Oral daily  hydrocortisone 1% Cream 1 Application(s) Topical daily  insulin lispro (ADMELOG) corrective regimen sliding scale   SubCutaneous three times a day with meals  levothyroxine 137 MICROGram(s) Oral daily  metoprolol tartrate 25 milliGRAM(s) Oral every 8 hours  mupirocin 2% Ointment 1 Application(s) Topical two times a day  pantoprazole    Tablet 40 milliGRAM(s) Oral before breakfast  polyethylene glycol 3350 17 Gram(s) Oral daily  sodium chloride 0.9%. 1000 milliLiter(s) (10 mL/Hr) IV Continuous <Continuous>  warfarin 5 milliGRAM(s) Oral once      TELEMETRY: 	    ECG:  	  RADIOLOGY:  OTHER: 	  	  LABS:	 	    CARDIAC MARKERS:                                8.0    8.95  )-----------( 112      ( 04 Sep 2021 00:12 )             24.9     09-04    140  |  107  |  36<H>  ----------------------------<  134<H>  4.4   |  25  |  0.86    Ca    9.0      04 Sep 2021 00:12  Phos  2.1     09-04  Mg     2.1     09-04    TPro  5.8<L>  /  Alb  3.4  /  TBili  0.6  /  DBili  x   /  AST  15  /  ALT  9<L>  /  AlkPhos  76  09-04    proBNP: Serum Pro-Brain Natriuretic Peptide: 1881 pg/mL (08-18 @ 16:10)    Lipid Profile:   HgA1c:   TSH: Thyroid Stimulating Hormone, Serum: 5.02 uIU/mL (08-25 @ 22:25)          Assessment and plan  ---------------------------  s/p CABG 2 vessels and MVR/ bio  post op bleeding, s/p blood transfusion  extubated  hgb stable  ppm noted hr has decreased to hr  to 600 jose 120 AAAIR/ DDDR  s/p extubation  ?AC post MVR as per ct surgery  need to change PPM  battery prior to Discharge  oob to  chair  continue beta blocker, amio dc d  started on ac  physical therapy

## 2021-09-05 DIAGNOSIS — Z95.1 PRESENCE OF AORTOCORONARY BYPASS GRAFT: ICD-10-CM

## 2021-09-05 LAB
ALBUMIN SERPL ELPH-MCNC: 2.8 G/DL — LOW (ref 3.3–5)
ALP SERPL-CCNC: 63 U/L — SIGNIFICANT CHANGE UP (ref 40–120)
ALT FLD-CCNC: 9 U/L — LOW (ref 10–45)
ANION GAP SERPL CALC-SCNC: 10 MMOL/L — SIGNIFICANT CHANGE UP (ref 5–17)
APTT BLD: 27.9 SEC — SIGNIFICANT CHANGE UP (ref 27.5–35.5)
AST SERPL-CCNC: 11 U/L — SIGNIFICANT CHANGE UP (ref 10–40)
BILIRUB SERPL-MCNC: 0.5 MG/DL — SIGNIFICANT CHANGE UP (ref 0.2–1.2)
BLD GP AB SCN SERPL QL: NEGATIVE — SIGNIFICANT CHANGE UP
BUN SERPL-MCNC: 25 MG/DL — HIGH (ref 7–23)
CALCIUM SERPL-MCNC: 7.8 MG/DL — LOW (ref 8.4–10.5)
CHLORIDE SERPL-SCNC: 107 MMOL/L — SIGNIFICANT CHANGE UP (ref 96–108)
CO2 SERPL-SCNC: 21 MMOL/L — LOW (ref 22–31)
CREAT SERPL-MCNC: 0.72 MG/DL — SIGNIFICANT CHANGE UP (ref 0.5–1.3)
GAS PNL BLDA: SIGNIFICANT CHANGE UP
GAS PNL BLDA: SIGNIFICANT CHANGE UP
GLUCOSE BLDC GLUCOMTR-MCNC: 128 MG/DL — HIGH (ref 70–99)
GLUCOSE BLDC GLUCOMTR-MCNC: 130 MG/DL — HIGH (ref 70–99)
GLUCOSE BLDC GLUCOMTR-MCNC: 137 MG/DL — HIGH (ref 70–99)
GLUCOSE SERPL-MCNC: 108 MG/DL — HIGH (ref 70–99)
HCT VFR BLD CALC: 19.8 % — CRITICAL LOW (ref 39–50)
HCT VFR BLD CALC: 24 % — LOW (ref 39–50)
HGB BLD-MCNC: 6.4 G/DL — CRITICAL LOW (ref 13–17)
HGB BLD-MCNC: 7.7 G/DL — LOW (ref 13–17)
INR BLD: 1.18 RATIO — HIGH (ref 0.88–1.16)
MAGNESIUM SERPL-MCNC: 1.7 MG/DL — SIGNIFICANT CHANGE UP (ref 1.6–2.6)
MCHC RBC-ENTMCNC: 29.5 PG — SIGNIFICANT CHANGE UP (ref 27–34)
MCHC RBC-ENTMCNC: 29.5 PG — SIGNIFICANT CHANGE UP (ref 27–34)
MCHC RBC-ENTMCNC: 32.1 GM/DL — SIGNIFICANT CHANGE UP (ref 32–36)
MCHC RBC-ENTMCNC: 32.3 GM/DL — SIGNIFICANT CHANGE UP (ref 32–36)
MCV RBC AUTO: 91.2 FL — SIGNIFICANT CHANGE UP (ref 80–100)
MCV RBC AUTO: 92 FL — SIGNIFICANT CHANGE UP (ref 80–100)
NRBC # BLD: 0 /100 WBCS — SIGNIFICANT CHANGE UP (ref 0–0)
NRBC # BLD: 0 /100 WBCS — SIGNIFICANT CHANGE UP (ref 0–0)
PHOSPHATE SERPL-MCNC: 2.2 MG/DL — LOW (ref 2.5–4.5)
PLATELET # BLD AUTO: 100 K/UL — LOW (ref 150–400)
PLATELET # BLD AUTO: 122 K/UL — LOW (ref 150–400)
POTASSIUM SERPL-MCNC: 3.5 MMOL/L — SIGNIFICANT CHANGE UP (ref 3.5–5.3)
POTASSIUM SERPL-SCNC: 3.5 MMOL/L — SIGNIFICANT CHANGE UP (ref 3.5–5.3)
PROT SERPL-MCNC: 4.8 G/DL — LOW (ref 6–8.3)
PROTHROM AB SERPL-ACNC: 14 SEC — HIGH (ref 10.6–13.6)
RBC # BLD: 2.17 M/UL — LOW (ref 4.2–5.8)
RBC # BLD: 2.61 M/UL — LOW (ref 4.2–5.8)
RBC # FLD: 14.3 % — SIGNIFICANT CHANGE UP (ref 10.3–14.5)
RBC # FLD: 14.4 % — SIGNIFICANT CHANGE UP (ref 10.3–14.5)
RH IG SCN BLD-IMP: NEGATIVE — SIGNIFICANT CHANGE UP
SODIUM SERPL-SCNC: 138 MMOL/L — SIGNIFICANT CHANGE UP (ref 135–145)
WBC # BLD: 6.27 K/UL — SIGNIFICANT CHANGE UP (ref 3.8–10.5)
WBC # BLD: 7.6 K/UL — SIGNIFICANT CHANGE UP (ref 3.8–10.5)
WBC # FLD AUTO: 6.27 K/UL — SIGNIFICANT CHANGE UP (ref 3.8–10.5)
WBC # FLD AUTO: 7.6 K/UL — SIGNIFICANT CHANGE UP (ref 3.8–10.5)

## 2021-09-05 PROCEDURE — 71045 X-RAY EXAM CHEST 1 VIEW: CPT | Mod: 26

## 2021-09-05 PROCEDURE — 99291 CRITICAL CARE FIRST HOUR: CPT

## 2021-09-05 RX ORDER — MAGNESIUM SULFATE 500 MG/ML
1 VIAL (ML) INJECTION ONCE
Refills: 0 | Status: COMPLETED | OUTPATIENT
Start: 2021-09-05 | End: 2021-09-05

## 2021-09-05 RX ORDER — WARFARIN SODIUM 2.5 MG/1
3 TABLET ORAL ONCE
Refills: 0 | Status: COMPLETED | OUTPATIENT
Start: 2021-09-05 | End: 2021-09-05

## 2021-09-05 RX ORDER — POTASSIUM CHLORIDE 20 MEQ
40 PACKET (EA) ORAL ONCE
Refills: 0 | Status: COMPLETED | OUTPATIENT
Start: 2021-09-05 | End: 2021-09-05

## 2021-09-05 RX ORDER — METOPROLOL TARTRATE 50 MG
50 TABLET ORAL
Refills: 0 | Status: DISCONTINUED | OUTPATIENT
Start: 2021-09-05 | End: 2021-09-06

## 2021-09-05 RX ORDER — AMIODARONE HYDROCHLORIDE 400 MG/1
400 TABLET ORAL
Refills: 0 | Status: DISCONTINUED | OUTPATIENT
Start: 2021-09-05 | End: 2021-09-05

## 2021-09-05 RX ADMIN — WARFARIN SODIUM 3 MILLIGRAM(S): 2.5 TABLET ORAL at 21:32

## 2021-09-05 RX ADMIN — Medication 1 APPLICATION(S): at 17:41

## 2021-09-05 RX ADMIN — Medication 1 APPLICATION(S): at 12:19

## 2021-09-05 RX ADMIN — POLYETHYLENE GLYCOL 3350 17 GRAM(S): 17 POWDER, FOR SOLUTION ORAL at 12:18

## 2021-09-05 RX ADMIN — Medication 81 MILLIGRAM(S): at 12:18

## 2021-09-05 RX ADMIN — PANTOPRAZOLE SODIUM 40 MILLIGRAM(S): 20 TABLET, DELAYED RELEASE ORAL at 06:25

## 2021-09-05 RX ADMIN — Medication 25 MILLIGRAM(S): at 12:12

## 2021-09-05 RX ADMIN — CHLORHEXIDINE GLUCONATE 1 APPLICATION(S): 213 SOLUTION TOPICAL at 06:25

## 2021-09-05 RX ADMIN — ESCITALOPRAM OXALATE 5 MILLIGRAM(S): 10 TABLET, FILM COATED ORAL at 12:29

## 2021-09-05 RX ADMIN — Medication 62.5 MILLIMOLE(S): at 02:00

## 2021-09-05 RX ADMIN — CILOSTAZOL 100 MILLIGRAM(S): 100 TABLET ORAL at 17:40

## 2021-09-05 RX ADMIN — Medication 50 MILLIGRAM(S): at 17:41

## 2021-09-05 RX ADMIN — Medication 25 MILLIGRAM(S): at 05:13

## 2021-09-05 RX ADMIN — Medication 100 GRAM(S): at 01:59

## 2021-09-05 RX ADMIN — ENOXAPARIN SODIUM 40 MILLIGRAM(S): 100 INJECTION SUBCUTANEOUS at 12:18

## 2021-09-05 RX ADMIN — Medication 1 APPLICATION(S): at 06:25

## 2021-09-05 RX ADMIN — ATORVASTATIN CALCIUM 40 MILLIGRAM(S): 80 TABLET, FILM COATED ORAL at 21:32

## 2021-09-05 RX ADMIN — CILOSTAZOL 100 MILLIGRAM(S): 100 TABLET ORAL at 05:10

## 2021-09-05 RX ADMIN — Medication 40 MILLIEQUIVALENT(S): at 02:00

## 2021-09-05 RX ADMIN — Medication 137 MICROGRAM(S): at 05:11

## 2021-09-05 NOTE — PROGRESS NOTE ADULT - SUBJECTIVE AND OBJECTIVE BOX
CARDIOLOGY     PROGRESS  NOTE   ________________________________________________    CHIEF COMPLAINT:Patient is a 76y old  Male who presents with a chief complaint of sob (05 Sep 2021 09:12)  doing better.  	  REVIEW OF SYSTEMS:  CONSTITUTIONAL: No fever, weight loss, or fatigue  EYES: No eye pain, visual disturbances, or discharge  ENT:  No difficulty hearing, tinnitus, vertigo; No sinus or throat pain  NECK: No pain or stiffness  RESPIRATORY: No cough, wheezing, chills or hemoptysis; No Shortness of Breath  CARDIOVASCULAR: No chest pain, palpitations, passing out, dizziness, or leg swelling  GASTROINTESTINAL: No abdominal or epigastric pain. No nausea, vomiting, or hematemesis; No diarrhea or constipation. No melena or hematochezia.  GENITOURINARY: No dysuria, frequency, hematuria, or incontinence  NEUROLOGICAL: No headaches, memory loss, loss of strength, numbness, or tremors  SKIN: No itching, burning, rashes, or lesions   LYMPH Nodes: No enlarged glands  ENDOCRINE: No heat or cold intolerance; No hair loss  MUSCULOSKELETAL: No joint pain or swelling; No muscle, back, or extremity pain  PSYCHIATRIC: No depression, anxiety, mood swings, or difficulty sleeping  HEME/LYMPH: No easy bruising, or bleeding gums  ALLERGY AND IMMUNOLOGIC: No hives or eczema	    [ ] All others negative	  [ ] Unable to obtain    PHYSICAL EXAM:  T(C): 36.7 (09-05-21 @ 09:20), Max: 37.1 (09-04-21 @ 19:00)  HR: 108 (09-05-21 @ 09:20) (90 - 113)  BP: 124/62 (09-05-21 @ 09:20) (124/62 - 124/62)  RR: 22 (09-05-21 @ 09:20) (11 - 36)  SpO2: 92% (09-05-21 @ 09:20) (92% - 98%)  Wt(kg): --  I&O's Summary    04 Sep 2021 07:01  -  05 Sep 2021 07:00  --------------------------------------------------------  IN: 1249.3 mL / OUT: 1235 mL / NET: 14.3 mL    05 Sep 2021 07:01  -  05 Sep 2021 10:12  --------------------------------------------------------  IN: 0 mL / OUT: 75 mL / NET: -75 mL        Appearance: Normal	  HEENT:   Normal oral mucosa, PERRL, EOMI	  Lymphatic: No lymphadenopathy  Cardiovascular: Normal S1 S2, No JVD, + murmurs, No edema  Respiratory: rhonchi  Psychiatry: A & O x 3, Mood & affect appropriate  Gastrointestinal:  Soft, Non-tender, + BS	  Skin: No rashes, No ecchymoses, No cyanosis	  Neurologic: Non-focal  Extremities: Normal range of motion, No clubbing, cyanosis or edema  Vascular: Peripheral pulses palpable 2+ bilaterally    MEDICATIONS  (STANDING):  aspirin  chewable 81 milliGRAM(s) Oral daily  atorvastatin 40 milliGRAM(s) Oral at bedtime  chlorhexidine 2% Cloths 1 Application(s) Topical <User Schedule>  cilostazol 100 milliGRAM(s) Oral every 12 hours  clotrimazole 1% Cream 1 Application(s) Topical two times a day  enoxaparin Injectable 40 milliGRAM(s) SubCutaneous daily  escitalopram 5 milliGRAM(s) Oral daily  hydrocortisone 1% Cream 1 Application(s) Topical daily  insulin lispro (ADMELOG) corrective regimen sliding scale   SubCutaneous three times a day with meals  levothyroxine 137 MICROGram(s) Oral daily  metoprolol tartrate 25 milliGRAM(s) Oral every 8 hours  pantoprazole    Tablet 40 milliGRAM(s) Oral before breakfast  polyethylene glycol 3350 17 Gram(s) Oral daily      TELEMETRY: 	    ECG:  	  RADIOLOGY:  OTHER: 	  	  LABS:	 	    CARDIAC MARKERS:                                7.7    7.60  )-----------( 122      ( 05 Sep 2021 01:32 )             24.0     09-05    138  |  107  |  25<H>  ----------------------------<  108<H>  3.5   |  21<L>  |  0.72    Ca    7.8<L>      05 Sep 2021 00:49  Phos  2.2     09-05  Mg     1.7     09-05    TPro  4.8<L>  /  Alb  2.8<L>  /  TBili  0.5  /  DBili  x   /  AST  11  /  ALT  9<L>  /  AlkPhos  63  09-05    proBNP: Serum Pro-Brain Natriuretic Peptide: 1881 pg/mL (08-18 @ 16:10)    Lipid Profile:   HgA1c:   TSH: Thyroid Stimulating Hormone, Serum: 5.02 uIU/mL (08-25 @ 22:25)    PT/INR - ( 04 Sep 2021 11:47 )   PT: 13.6 sec;   INR: 1.14 ratio      < from: TTE with Doppler (w/Cont) (09.03.21 @ 15:30) >  1. Bioprosthetic mitral valve replacement.   The valve is  well seated.  Minimal mitral regurgitation. Peak mitral  valve gradient equals 8 mm Hg, mean transmitral valve  gradient equals 4 mm Hg, which is probably normal in the  setting of a bioprosthetic mitral valve replacement.  Gradients measured at a HR of 82bpm.  2. Normal trileaflet aortic valve. Mild aortic  regurgitation.  3. Endocardial visualization enhanced with intravenous  injection of Ultrasonic Enhancing Agent (Definity). No left  ventricular thrombus. Normal left ventricular systolic  function. No segmental wall motion abnormalities. Septal  motion consistent with cardiac surgery.  4. The right ventricle is not well visualized.       Assessment and plan  ---------------------------  s/p CABG 2 vessels and MVR/ bio  post op bleeding, s/p blood transfusion  extubated  hgb stable  ppm noted hr has decreased to hr  to 600 jose 120 AAAIR/ DDDR  s/p extubation  ?AC post MVR as per ct surgery  need to change PPM  battery prior to Discharge  oob to  chair  continue beta blocker, amio dc d  physical therapy  PPM almost EOL, plan to change the ppm battery prior to dc ? Tuesday as long as inr less than 1.8

## 2021-09-05 NOTE — PROGRESS NOTE ADULT - SUBJECTIVE AND OBJECTIVE BOX
VITAL SIGNS-Telemetry:  afib 90 -100 up to 130  Vital Signs Last 24 Hrs  T(C): 36.6 (21 @ 11:38), Max: 37.1 (21 @ 19:00)  T(F): 97.9 (21 @ 11:38), Max: 98.8 (21 @ 19:00)  HR: 102 (21 @ 11:38) (94 - 113)  BP: 130/58 (21 @ 11:38) (124/62 - 130/58)  RR: 18 (21 @ 11:38) (11 - 36)  SpO2: 94% (21 @ 11:38) (92% - 98%)          @ 07:01  -   @ 07:00  --------------------------------------------------------  IN: 1249.3 mL / OUT: 1235 mL / NET: 14.3 mL     @ 07:01  -   @ 12:12  --------------------------------------------------------  IN: 120 mL / OUT: 75 mL / NET: 45 mL    Daily     Daily Weight in k.7 (05 Sep 2021 00:00)    CAPILLARY BLOOD GLUCOSE  POCT Blood Glucose.: 130 mg/dL (05 Sep 2021 11:45)  POCT Blood Glucose.: 137 mg/dL (05 Sep 2021 06:49)  POCT Blood Glucose.: 124 mg/dL (04 Sep 2021 16:35)        Pacing Wires        [  ]   Settings:                                  Isolated  [ x ]  Coumadin    [x] YES          [  ]      NO         Reason:     mvr f  PHYSICAL EXAM:  Neurology: alert and oriented x 3, nonfocal, no gross deficits  CV : IRR IRR  Sternal Wound :  CDI , Stable  Lungs: cta  Abdomen: soft, nontender, nondistended, positive bowel sounds, last bowel movement   +bm      Extremities:     + edema, RLE inc cdi w/ dsg in place.  no calf tenderness - LLE inability to bend d/t fusion sx last year    acetaminophen   Tablet .. 650 milliGRAM(s) Oral every 6 hours PRN  aspirin  chewable 81 milliGRAM(s) Oral daily  atorvastatin 40 milliGRAM(s) Oral at bedtime  chlorhexidine 2% Cloths 1 Application(s) Topical <User Schedule>  cilostazol 100 milliGRAM(s) Oral every 12 hours  clotrimazole 1% Cream 1 Application(s) Topical two times a day  enoxaparin Injectable 40 milliGRAM(s) SubCutaneous daily  escitalopram 5 milliGRAM(s) Oral daily  hydrocortisone 1% Cream 1 Application(s) Topical daily  insulin lispro (ADMELOG) corrective regimen sliding scale   SubCutaneous three times a day with meals  levothyroxine 137 MICROGram(s) Oral daily  metoprolol tartrate 50 milliGRAM(s) Oral two times a day  pantoprazole    Tablet 40 milliGRAM(s) Oral before breakfast  polyethylene glycol 3350 17 Gram(s) Oral daily    Physical Therapy Rec:   Home  [  ]   Home w/ PT  [  ]  Rehab  [  ]  Discussed with Cardiothoracic Team at AM rounds.

## 2021-09-05 NOTE — PROGRESS NOTE ADULT - SUBJECTIVE AND OBJECTIVE BOX
INTERVAL HPI/OVERNIGHT EVENTS:  s/p CABG/MVR,   transfer to floor   hgb declined, but repeat improved  no melena      MEDICATIONS  (PRN):      Allergies    alfuzosin (Hives)  levofloxacin (Hives)  Myrbetriq (Hives)  tamsulosin (Hives)    Intolerances      ROS   unable to obtain         PHYSICAL EXAM:   Vital Signs:  Vital Signs Last 24 Hrs  T(C): 37.1 (29 Aug 2021 04:21), Max: 37.1 (29 Aug 2021 04:21)  T(F): 98.7 (29 Aug 2021 04:21), Max: 98.7 (29 Aug 2021 04:21)  HR: 55 (29 Aug 2021 04:21) (55 - 78)  BP: 116/61 (29 Aug 2021 04:21) (116/61 - 146/63)  BP(mean): --  RR: 18 (29 Aug 2021 04:21) (18 - 18)  SpO2: 93% (29 Aug 2021 04:21) (93% - 96%)  Daily     Daily Weight in k.2 (29 Aug 2021 07:30)I&O's Summary    28 Aug 2021 07:  -  29 Aug 2021 07:00  --------------------------------------------------------  IN: 1110 mL / OUT: 5 mL / NET: -915 mL    29 Aug 2021 07:01  -  29 Aug 2021 10:27  --------------------------------------------------------  IN: 240 mL / OUT: 800 mL / NET: -560 mL        GENERAL:  Appears stated age, well-groomed, well-nourished, no distress  HEENT:  NC/AT,  conjunctivae clear and pink, no thyromegaly, nodules, adenopathy, no JVD, sclera -anicteric  CHEST:  Full & symmetric excursion, no increased effort, breath sounds clear  HEART:  Regular rhythm, S1, S2, no murmur/rub/S3/S4, no abdominal bruit, no edema  ABDOMEN:  Soft, non-tender, non-distended, normoactive bowel sounds,  no masses ,no hepato-splenomegaly, no signs of chronic liver disease  EXTEREMITIES:  no cyanosis,clubbing or edema  SKIN:  No rash/erythema/ecchymoses/petechiae/wounds/abscess/warm/dry  NEURO:  Alert, oriented, no asterixis, no tremor, no encephalopathy      LABS:                        11.5   5.65  )-----------( 138      ( 29 Aug 2021 06:30 )             36.1     08    139  |  105  |  44<H>  ----------------------------<  113<H>  4.5   |  21<L>  |  1.06    Ca    9.8      29 Aug 2021 06:30          amylase   lipase  RADIOLOGY & ADDITIONAL TESTS:

## 2021-09-05 NOTE — PROGRESS NOTE ADULT - SUBJECTIVE AND OBJECTIVE BOX
JANEE ARCEO  MRN-[id number]  Patient is a 76y old  Male who presents with a chief complaint of sob (03 Sep 2021 09:18)    HPI:  77 yo M    h/o  HTN   PPM, CAD.  LYMPHEDEMA. HTN      pw low back pain and SOB.      Patient states his low back pain began 5 days ago. He denies any trauma or falls.     States the pain was across his entire lower back. Denies  leg   numbness, tingling, leg weakness, urinary or bowl incontinence.      He states the pain has improved but states he is still using his walker to walk instead of his cane.     He states of  the past 4 days he has also noticed DUNCAN. States he is only able to walk short distances with becoming dyspnea  and has  leg swelling.     He denies fever, chills, cough, chest pain, orthopnea, PND.      He states he did not take his lasix for the past few days because it makes him urinate too much. (18 Aug 2021 19:37)      Surgery/Hospital Course:    admit sob, chf, R 1st toe wound    Ep inc back-up rate to 60, mult blood products ? TRALI, hypoxia    rapid Afib,  d/c'd, amio   LAsix drip d/d, Extubated          Today:   Patient was in rapid Afib overnight, was given one dose of dig and placed on IV Lopressor.  Lasix drip was d/c'd and switched to intermittent pushes as needed. Insulin drip was d/c'd at 6am. Will possibly d/c chest tubes today; plan to have pt out of bed to chair as tolerated.       ICU Vital Signs Last 24 Hrs  T(C): 37.1 (05 Sep 2021 08:00), Max: 37.1 (04 Sep 2021 19:00)  T(F): 98.8 (05 Sep 2021 08:00), Max: 98.8 (04 Sep 2021 19:00)  HR: 111 (05 Sep 2021 08:00) (90 - 113)  BP: --  BP(mean): --  ABP: 121/54 (05 Sep 2021 08:00) (98/48 - 161/70)  ABP(mean): 74 (05 Sep 2021 08:00) (61 - 98)  RR: 23 (05 Sep 2021 08:00) (11 - 36)  SpO2: 94% (05 Sep 2021 08:00) (93% - 98%)    Physical Exam:  Gen:  Awake, alert   CNS: non focal 	  Neck: no JVD  RES : clear , no wheezing        Chest: +Chest tubes         CVS: Regular  rhythm. Normal S1/S2  Abd: Soft, non-distended. Bowel sounds present.  Skin: No rash.  Ext:  no edema    ============================I/O===========================  I&O's Detail    04 Sep 2021 07:01  -  05 Sep 2021 07:00  --------------------------------------------------------  IN:    IV PiggyBack: 250 mL    IV PiggyBack: 458.3 mL    Oral Fluid: 360 mL    sodium chloride 0.9%: 181 mL  Total IN: 1249.3 mL    OUT:    Indwelling Catheter - Urethral (mL): 1160 mL    Indwelling Catheter - Urethral (mL): 75 mL  Total OUT: 1235 mL    Total NET: 14.3 mL      05 Sep 2021 07:01  -  05 Sep 2021 09:13  --------------------------------------------------------  IN:  Total IN: 0 mL    OUT:    Indwelling Catheter - Urethral (mL): 75 mL  Total OUT: 75 mL    Total NET: -75 mL          ============================ LABS =========================                        7.7    7.60  )-----------( 122      ( 05 Sep 2021 01:32 )             24.0       138  |  107  |  25<H>  ----------------------------<  108<H>  3.5   |  21<L>  |  0.72    Ca    7.8<L>      05 Sep 2021 00:49  Phos  2.2       Mg     1.7         TPro  4.8<L>  /  Alb  2.8<L>  /  TBili  0.5  /  DBili  x   /  AST  11  /  ALT  9<L>  /  AlkPhos  63          ======================Micro/Rad/Cardio=================  Culture: Reviewed   CXR: Reviewed  Echo: Reviewed  ======================================================  PAST MEDICAL & SURGICAL HISTORY:  Afib  not on anticoagulation    CAD (coronary artery disease)    Varicose vein of leg    Hypothyroid    Pacemaker    H/O fracture of hip      H/O asbestosis    HTN (hypertension)    Pacemaker  [employer] - Model RVDR01 1/10/2012    Stented coronary artery  drug eluding stent 2007    H/O detached retina repair      S/P cataract surgery    History of hip surgery  left hip ORIF    H/O varicose vein stripping   left leg    History of left knee replacement   - multiple infections post op requiring reoperation in , , )    H/O foot surgery  for infection of right foot     H/O inguinal hernia repair  right       ====================ASSESSMENT ==============  MVR (t), C2L POD #4   Hypotension  Hypovolemic shock  Post op respiratory insufficiency   Acute blood loss anemia s/p multiple blood products  Thrombocytopenia   A Fib  Stress Hyperglycemia       Plan:  ====================== NEUROLOGY=====================  Nonfocal, continue to monitor neuro status per ICU protocol.   Tylenol PRN for analgesia   C/w Lexapro     acetaminophen   Tablet .. 650 milliGRAM(s) Oral every 6 hours PRN Mild Pain (1 - 3)  escitalopram 5 milliGRAM(s) Oral daily    ==================== RESPIRATORY======================  Receiving supplemental O2 therapy with 6L NC  Continue to monitor RR, breathing pattern, pulse ox, and ABG's.  Encourage incentive spirometry.   ====================CARDIOVASCULAR==================  MVR (t), C2L   Patient was in rapid Afib overnight, was given one dose of dig and placed on IV Lopressor.  Continue with ASA and Lipitor for graft patency.   Invasive hemodynamic monitoring, assess perfusion indices.   AYLIEN PPM'12     metoprolol tartrate 25 milliGRAM(s) Oral every 8 hours  aspirin  chewable 81 milliGRAM(s) Oral daily  atorvastatin 40 milliGRAM(s) Oral at bedtime  ===================HEMATOLOGIC/ONC ===================  Acute blood loss Anemia s/p multiple blood products intraoperatively and in CTU   Thrombocytopenia   Monitor H&H/Plts    Continue enoxaparin and Pletal for venous thromboembolism prophylaxis.     cilostazol 100 milliGRAM(s) Oral every 12 hours  enoxaparin Injectable 40 milliGRAM(s) SubCutaneous daily  ===================== RENAL =========================  Lasix drip was d/c'd and switched to intermittent pushes as needed.   Continue to monitor I/Os, BUN/Creatinine, and urine output.   Goal net negative fluid balance. Replete lytes PRN. Keep K> 4 and Mg >2.   ==================== GASTROINTESTINAL===================  Tolerating PO consistent carb diet.   Continue Protonix for stress ulcer prophylaxis.   Bowel regimen with Miralax.     pantoprazole    Tablet 40 milliGRAM(s) Oral before breakfast  polyethylene glycol 3350 17 Gram(s) Oral daily  sodium chloride 0.9%. 1000 milliLiter(s) (10 mL/Hr) IV Continuous <Continuous>  =======================    ENDOCRINE  =====================  Stress Hyperglycemia, Glycemic control w/ Admelog SS.  Insulin drip was d/c'd at 6am.   Synthroid for hypothyroidism     insulin lispro (ADMELOG) corrective regimen sliding scale   SubCutaneous three times a day with meals  insulin regular Infusion 3 Unit(s)/Hr (3 mL/Hr) IV Continuous <Continuous>  levothyroxine 137 MICROGram(s) Oral daily  ========================INFECTIOUS DISEASE================  Afebrile, WBC within normal limits.   Monitor temperature and trend WBC. Monitor off abx.       I have spent 35 minutes providing acute care for this critically ill patient     Patient requires continuous monitoring with bedside rhythm monitoring, pulse ox monitoring, and intermittent blood gas analysis. Care plan discussed with ICU care team. Patient remained critical and at risk for life threatening decompensation.

## 2021-09-05 NOTE — PROGRESS NOTE ADULT - ASSESSMENT
75 yo  h/o PPM , CAD.  LYMPHEDEMA. HTN, MR,  a.fib on no ac secondary to frequent falls, PVD on pletal presents with SOB. Cardiac workup demonstrates severe MR and multivessel disease.    On 21, pt underwent MVR-t/CABG x 2 w/ LIMA  intraop & post-op bleeding w/ multiple products given ? TRALI-hypoxic- lasix gtt-     EP increased back up rate to 60 - .    gtt - rapid afib -  d/c'd, bb   lasix gtt d/c'd - EXTUBATED.    SDU - BB Increased.  -battery end-of-life - will change battery on  if INR < 1.8 per DR. Christensen   d/c plan

## 2021-09-06 LAB
ALBUMIN SERPL ELPH-MCNC: 3.1 G/DL — LOW (ref 3.3–5)
ALP SERPL-CCNC: 81 U/L — SIGNIFICANT CHANGE UP (ref 40–120)
ALT FLD-CCNC: 11 U/L — SIGNIFICANT CHANGE UP (ref 10–45)
ANION GAP SERPL CALC-SCNC: 10 MMOL/L — SIGNIFICANT CHANGE UP (ref 5–17)
APTT BLD: 26.9 SEC — LOW (ref 27.5–35.5)
AST SERPL-CCNC: 13 U/L — SIGNIFICANT CHANGE UP (ref 10–40)
BILIRUB SERPL-MCNC: 0.7 MG/DL — SIGNIFICANT CHANGE UP (ref 0.2–1.2)
BUN SERPL-MCNC: 26 MG/DL — HIGH (ref 7–23)
CALCIUM SERPL-MCNC: 9.2 MG/DL — SIGNIFICANT CHANGE UP (ref 8.4–10.5)
CHLORIDE SERPL-SCNC: 103 MMOL/L — SIGNIFICANT CHANGE UP (ref 96–108)
CO2 SERPL-SCNC: 24 MMOL/L — SIGNIFICANT CHANGE UP (ref 22–31)
CREAT SERPL-MCNC: 0.98 MG/DL — SIGNIFICANT CHANGE UP (ref 0.5–1.3)
GLUCOSE BLDC GLUCOMTR-MCNC: 132 MG/DL — HIGH (ref 70–99)
GLUCOSE SERPL-MCNC: 99 MG/DL — SIGNIFICANT CHANGE UP (ref 70–99)
HCT VFR BLD CALC: 25.2 % — LOW (ref 39–50)
HGB BLD-MCNC: 8.1 G/DL — LOW (ref 13–17)
INR BLD: 1.23 RATIO — HIGH (ref 0.88–1.16)
MAGNESIUM SERPL-MCNC: 1.9 MG/DL — SIGNIFICANT CHANGE UP (ref 1.6–2.6)
MCHC RBC-ENTMCNC: 30 PG — SIGNIFICANT CHANGE UP (ref 27–34)
MCHC RBC-ENTMCNC: 32.1 GM/DL — SIGNIFICANT CHANGE UP (ref 32–36)
MCV RBC AUTO: 93.3 FL — SIGNIFICANT CHANGE UP (ref 80–100)
NRBC # BLD: 0 /100 WBCS — SIGNIFICANT CHANGE UP (ref 0–0)
PHOSPHATE SERPL-MCNC: 2.5 MG/DL — SIGNIFICANT CHANGE UP (ref 2.5–4.5)
PLATELET # BLD AUTO: 129 K/UL — LOW (ref 150–400)
POTASSIUM SERPL-MCNC: 4.4 MMOL/L — SIGNIFICANT CHANGE UP (ref 3.5–5.3)
POTASSIUM SERPL-SCNC: 4.4 MMOL/L — SIGNIFICANT CHANGE UP (ref 3.5–5.3)
PROT SERPL-MCNC: 5.7 G/DL — LOW (ref 6–8.3)
PROTHROM AB SERPL-ACNC: 14.6 SEC — HIGH (ref 10.6–13.6)
RBC # BLD: 2.7 M/UL — LOW (ref 4.2–5.8)
RBC # FLD: 14.4 % — SIGNIFICANT CHANGE UP (ref 10.3–14.5)
SARS-COV-2 RNA SPEC QL NAA+PROBE: SIGNIFICANT CHANGE UP
SODIUM SERPL-SCNC: 137 MMOL/L — SIGNIFICANT CHANGE UP (ref 135–145)
WBC # BLD: 7.71 K/UL — SIGNIFICANT CHANGE UP (ref 3.8–10.5)
WBC # FLD AUTO: 7.71 K/UL — SIGNIFICANT CHANGE UP (ref 3.8–10.5)

## 2021-09-06 RX ORDER — METOPROLOL TARTRATE 50 MG
75 TABLET ORAL
Refills: 0 | Status: DISCONTINUED | OUTPATIENT
Start: 2021-09-06 | End: 2021-09-06

## 2021-09-06 RX ORDER — METOPROLOL TARTRATE 50 MG
75 TABLET ORAL
Refills: 0 | Status: DISCONTINUED | OUTPATIENT
Start: 2021-09-06 | End: 2021-09-07

## 2021-09-06 RX ORDER — WARFARIN SODIUM 2.5 MG/1
4 TABLET ORAL ONCE
Refills: 0 | Status: COMPLETED | OUTPATIENT
Start: 2021-09-06 | End: 2021-09-06

## 2021-09-06 RX ORDER — METOPROLOL TARTRATE 50 MG
25 TABLET ORAL ONCE
Refills: 0 | Status: COMPLETED | OUTPATIENT
Start: 2021-09-06 | End: 2021-09-06

## 2021-09-06 RX ADMIN — PANTOPRAZOLE SODIUM 40 MILLIGRAM(S): 20 TABLET, DELAYED RELEASE ORAL at 05:11

## 2021-09-06 RX ADMIN — Medication 50 MILLIGRAM(S): at 17:01

## 2021-09-06 RX ADMIN — Medication 25 MILLIGRAM(S): at 17:46

## 2021-09-06 RX ADMIN — ESCITALOPRAM OXALATE 5 MILLIGRAM(S): 10 TABLET, FILM COATED ORAL at 11:57

## 2021-09-06 RX ADMIN — CILOSTAZOL 100 MILLIGRAM(S): 100 TABLET ORAL at 05:12

## 2021-09-06 RX ADMIN — Medication 137 MICROGRAM(S): at 05:11

## 2021-09-06 RX ADMIN — WARFARIN SODIUM 4 MILLIGRAM(S): 2.5 TABLET ORAL at 21:06

## 2021-09-06 RX ADMIN — CILOSTAZOL 100 MILLIGRAM(S): 100 TABLET ORAL at 17:01

## 2021-09-06 RX ADMIN — Medication 81 MILLIGRAM(S): at 11:57

## 2021-09-06 RX ADMIN — Medication 1 APPLICATION(S): at 12:00

## 2021-09-06 RX ADMIN — Medication 1 APPLICATION(S): at 05:14

## 2021-09-06 RX ADMIN — Medication 1 APPLICATION(S): at 17:01

## 2021-09-06 RX ADMIN — CHLORHEXIDINE GLUCONATE 1 APPLICATION(S): 213 SOLUTION TOPICAL at 06:45

## 2021-09-06 RX ADMIN — ENOXAPARIN SODIUM 40 MILLIGRAM(S): 100 INJECTION SUBCUTANEOUS at 11:57

## 2021-09-06 RX ADMIN — Medication 50 MILLIGRAM(S): at 05:11

## 2021-09-06 RX ADMIN — ATORVASTATIN CALCIUM 40 MILLIGRAM(S): 80 TABLET, FILM COATED ORAL at 21:06

## 2021-09-06 NOTE — PROGRESS NOTE ADULT - ASSESSMENT
anemia  afib  cad    s/p CABG and MVR 8/30  no overt gi bleed  denies melena/hematochezia  hgb stable  had egd/colon 12/2020 (cher metha)  cbc daily  transfuse PRN  cardio following   will follow   dw patient     Advanced care planning was discussed with patient and family.  Advanced care planning forms were reviewed and discussed.  Risks, benefits and alternatives of gastroenterologic procedures were discussed in detail and all questions were answered.    30 minutes spent.   (2) good, crying

## 2021-09-06 NOTE — PROGRESS NOTE ADULT - ASSESSMENT
76  year old male          h/o   , CAD s/p stent on ASA, A-fib/ PPM, , hypothyroid, and total left knee replacement       s/p rash across the upper torso/ back  for past week, pruritic on R arm, and rash in the groin      prior  PPM  interrogation  with  [facility]/   s/p  brief   bursts  of  afib/ , on prior visit         * admitted with sob,  from acute  diastolic  chf, related  to non compliance  with his  lasix   wound care/  lymphedema,  carla left  leg     *  CAD/ AFIb  /PPM,/ HTN     prior stent in 2007   on  on asa.,  toprol,  cozaar/ lipitor,  synthroid   *h/o  AFIB,    no  a/c       *Anemia, , hb stable, had  been seen by  gi  eval dr joann sanchez in past    right leg with distal redness.  not cellulitis / c/c  for past few  months  an d left leg lymphedema     xray spine. horse  shoe left kidney/ OA  of  spine  on    lasix,  ha  simproved    * Echo, normal  ef/ severe MR     ERENDIRA ,  severe  MR    cath, with 2 vessel disease   plan, CABG and  MVR  surg cordelia grider   hb is  8/  h/o  Afib on  coumadin  on asa.  lipitor, torpol,  synthroid          f/p wih  dr mikayla dick

## 2021-09-06 NOTE — PROGRESS NOTE ADULT - SUBJECTIVE AND OBJECTIVE BOX
afebrile    REVIEW OF SYSTEMS:  GEN: no fever,    no chills  RESP: no SOB,   no cough  CVS: no chest pain,   no palpitations  GI: no abdominal pain,   no nausea,   no vomiting,   no constipation,   no diarrhea  : no dysuria,   no frequency  NEURO: no headache,   no dizziness  PSYCH: no depression,   not anxious  Derm : no rash    MEDICATIONS  (STANDING):  aspirin  chewable 81 milliGRAM(s) Oral daily  atorvastatin 40 milliGRAM(s) Oral at bedtime  chlorhexidine 2% Cloths 1 Application(s) Topical <User Schedule>  cilostazol 100 milliGRAM(s) Oral every 12 hours  clotrimazole 1% Cream 1 Application(s) Topical two times a day  enoxaparin Injectable 40 milliGRAM(s) SubCutaneous daily  escitalopram 5 milliGRAM(s) Oral daily  hydrocortisone 1% Cream 1 Application(s) Topical daily  insulin lispro (ADMELOG) corrective regimen sliding scale   SubCutaneous three times a day with meals  levothyroxine 137 MICROGram(s) Oral daily  metoprolol tartrate 50 milliGRAM(s) Oral two times a day  pantoprazole    Tablet 40 milliGRAM(s) Oral before breakfast  polyethylene glycol 3350 17 Gram(s) Oral daily    MEDICATIONS  (PRN):  acetaminophen   Tablet .. 650 milliGRAM(s) Oral every 6 hours PRN Mild Pain (1 - 3)      Vital Signs Last 24 Hrs  T(C): 36.7 (06 Sep 2021 07:19), Max: 37.1 (05 Sep 2021 08:00)  T(F): 98.1 (06 Sep 2021 07:19), Max: 98.8 (05 Sep 2021 08:00)  HR: 91 (06 Sep 2021 07:19) (83 - 111)  BP: 99/68 (06 Sep 2021 07:19) (99/68 - 130/58)  BP(mean): 81 (06 Sep 2021 05:12) (77 - 88)  RR: 18 (06 Sep 2021 07:19) (17 - 23)  SpO2: 91% (06 Sep 2021 07:19) (91% - 97%)  CAPILLARY BLOOD GLUCOSE      POCT Blood Glucose.: 128 mg/dL (05 Sep 2021 16:40)  POCT Blood Glucose.: 130 mg/dL (05 Sep 2021 11:45)    I&O's Summary    05 Sep 2021 07:01  -  06 Sep 2021 07:00  --------------------------------------------------------  IN: 1310 mL / OUT: 875 mL / NET: 435 mL        PHYSICAL EXAM:  HEAD:  Atraumatic, Normocephalic  NECK: Supple, No   JVD  CHEST/LUNG:   no     rales,     no,    rhonchi  HEART: Regular rate and rhythm;         murmur  ABDOMEN: Soft, Nontender, ;   EXTREMITIES:    c.c     edema  NEUROLOGY:  alert    LABS:                        8.1    7.71  )-----------( 129      ( 06 Sep 2021 06:25 )             25.2     09-06    137  |  103  |  26<H>  ----------------------------<  99  4.4   |  24  |  0.98    Ca    9.2      06 Sep 2021 06:25  Phos  2.5     09-06  Mg     1.9     09-06    TPro  5.7<L>  /  Alb  3.1<L>  /  TBili  0.7  /  DBili  x   /  AST  13  /  ALT  11  /  AlkPhos  81  09-06    PT/INR - ( 06 Sep 2021 06:25 )   PT: 14.6 sec;   INR: 1.23 ratio         PTT - ( 06 Sep 2021 06:25 )  PTT:26.9 sec          ABG - ( 05 Sep 2021 06:06 )  pH, Arterial: 7.45  pH, Blood: x     /  pCO2: 39    /  pO2: 76    / HCO3: 27    / Base Excess: 2.9   /  SaO2: 97.9                  Thyroid Stimulating Hormone, Serum: 5.02 uIU/mL (08-25 @ 22:25)          Consultant(s) Notes Reviewed:      Care Discussed with Consultants/Other Providers:

## 2021-09-06 NOTE — PROGRESS NOTE ADULT - SUBJECTIVE AND OBJECTIVE BOX
CARDIOLOGY     PROGRESS  NOTE   ________________________________________________    CHIEF COMPLAINT:Patient is a 76y old  Male who presents with a chief complaint of sob (06 Sep 2021 07:51)  no complain.  	  REVIEW OF SYSTEMS:  CONSTITUTIONAL: No fever, weight loss, or fatigue  EYES: No eye pain, visual disturbances, or discharge  ENT:  No difficulty hearing, tinnitus, vertigo; No sinus or throat pain  NECK: No pain or stiffness  RESPIRATORY: No cough, wheezing, chills or hemoptysis; No Shortness of Breath  CARDIOVASCULAR: No chest pain, palpitations, passing out, dizziness, or leg swelling  GASTROINTESTINAL: No abdominal or epigastric pain. No nausea, vomiting, or hematemesis; No diarrhea or constipation. No melena or hematochezia.  GENITOURINARY: No dysuria, frequency, hematuria, or incontinence  NEUROLOGICAL: No headaches, memory loss, loss of strength, numbness, or tremors  SKIN: No itching, burning, rashes, or lesions   LYMPH Nodes: No enlarged glands  ENDOCRINE: No heat or cold intolerance; No hair loss  MUSCULOSKELETAL: No joint pain or swelling; No muscle, back, or extremity pain  PSYCHIATRIC: No depression, anxiety, mood swings, or difficulty sleeping  HEME/LYMPH: No easy bruising, or bleeding gums  ALLERGY AND IMMUNOLOGIC: No hives or eczema	    [ ] All others negative	  [ ] Unable to obtain    PHYSICAL EXAM:  T(C): 36.7 (09-06-21 @ 07:19), Max: 36.8 (09-05-21 @ 18:55)  HR: 91 (09-06-21 @ 07:19) (83 - 111)  BP: 99/68 (09-06-21 @ 07:19) (99/68 - 130/58)  RR: 18 (09-06-21 @ 07:19) (17 - 22)  SpO2: 91% (09-06-21 @ 07:19) (91% - 97%)  Wt(kg): --  I&O's Summary    05 Sep 2021 07:01  -  06 Sep 2021 07:00  --------------------------------------------------------  IN: 1310 mL / OUT: 875 mL / NET: 435 mL        Appearance: Normal	  HEENT:   Normal oral mucosa, PERRL, EOMI	  Lymphatic: No lymphadenopathy  Cardiovascular: Normal S1 S2, No JVD, +murmurs, No edema  Respiratory: Lungs clear to auscultation	  Psychiatry: A & O x 3, Mood & affect appropriate  Gastrointestinal:  Soft, Non-tender, + BS	  Skin: No rashes, No ecchymoses, No cyanosis	  Neurologic: Non-focal  Extremities: Normal range of motion, No clubbing, cyanosis or edema  Vascular: Peripheral pulses palpable 2+ bilaterally    MEDICATIONS  (STANDING):  aspirin  chewable 81 milliGRAM(s) Oral daily  atorvastatin 40 milliGRAM(s) Oral at bedtime  chlorhexidine 2% Cloths 1 Application(s) Topical <User Schedule>  cilostazol 100 milliGRAM(s) Oral every 12 hours  clotrimazole 1% Cream 1 Application(s) Topical two times a day  enoxaparin Injectable 40 milliGRAM(s) SubCutaneous daily  escitalopram 5 milliGRAM(s) Oral daily  hydrocortisone 1% Cream 1 Application(s) Topical daily  insulin lispro (ADMELOG) corrective regimen sliding scale   SubCutaneous three times a day with meals  levothyroxine 137 MICROGram(s) Oral daily  metoprolol tartrate 50 milliGRAM(s) Oral two times a day  pantoprazole    Tablet 40 milliGRAM(s) Oral before breakfast  polyethylene glycol 3350 17 Gram(s) Oral daily      TELEMETRY: 	    ECG:  	  RADIOLOGY:  OTHER: 	  	  LABS:	 	    CARDIAC MARKERS:                                8.1    7.71  )-----------( 129      ( 06 Sep 2021 06:25 )             25.2     09-06    137  |  103  |  26<H>  ----------------------------<  99  4.4   |  24  |  0.98    Ca    9.2      06 Sep 2021 06:25  Phos  2.5     09-06  Mg     1.9     09-06    TPro  5.7<L>  /  Alb  3.1<L>  /  TBili  0.7  /  DBili  x   /  AST  13  /  ALT  11  /  AlkPhos  81  09-06    proBNP: Serum Pro-Brain Natriuretic Peptide: 1881 pg/mL (08-18 @ 16:10)    Lipid Profile:   HgA1c:   TSH: Thyroid Stimulating Hormone, Serum: 5.02 uIU/mL (08-25 @ 22:25)    PT/INR - ( 06 Sep 2021 06:25 )   PT: 14.6 sec;   INR: 1.23 ratio         PTT - ( 06 Sep 2021 06:25 )  PTT:26.9 sec  < from: 12 Lead ECG (08.21.21 @ 17:52) >  Diagnosis Line SINUS RHYTHM WITH 1ST DEGREE A-V BLOCK WITH OCCASIONAL PREMATURE VENTRICULAR COMPLEXES  WHEN COMPARED WITH ECG OF 18-AUG-2021 17:29,  SIGNIFICANT CHANGES HAVE OCCURRED  PVC is nted    < from: 12 Lead ECG (09.01.21 @ 00:31) >  Diagnosis Line SINUS RHYTHM WITH 1ST DEGREE A-V BLOCK WITH PREMATURE SUPRAVENTRICULAR COMPLEXES  WHEN COMPARED WITH ECG OF 31-AUG-2021 00:11,  NO SIGNIFICANT CHANGE WAS FOUND        Assessment and plan  ---------------------------  s/p CABG 2 vessels and MVR/ bio  post op bleeding, s/p blood transfusion  extubated  hgb stable  ppm noted hr has decreased to hr  to 600 jose 120 AAAIR/ DDDR  s/p extubation  ?AC post MVR as per ct surgery  need to change PPM  battery prior to Discharge  oob to  chair  physical therapy  PPM almost EOL, plan to change the ppm battery prior to dc ? Tuesday as long as inr less than 1.8  pt with hx of PAF, now in a.fib ?amio load will consider possible cardioversion latter on  a.fib hr is well controlled  start iv heparin for ac  if clear bu ct surgery ?ppm battery change tomorrow  fu hgb    	                    CARDIOLOGY     PROGRESS  NOTE   ________________________________________________    CHIEF COMPLAINT:Patient is a 76y old  Male who presents with a chief complaint of sob (06 Sep 2021 07:51)  no complain.  	  REVIEW OF SYSTEMS:  CONSTITUTIONAL: No fever, weight loss, or fatigue  EYES: No eye pain, visual disturbances, or discharge  ENT:  No difficulty hearing, tinnitus, vertigo; No sinus or throat pain  NECK: No pain or stiffness  RESPIRATORY: No cough, wheezing, chills or hemoptysis; No Shortness of Breath  CARDIOVASCULAR: No chest pain, palpitations, passing out, dizziness, or leg swelling  GASTROINTESTINAL: No abdominal or epigastric pain. No nausea, vomiting, or hematemesis; No diarrhea or constipation. No melena or hematochezia.  GENITOURINARY: No dysuria, frequency, hematuria, or incontinence  NEUROLOGICAL: No headaches, memory loss, loss of strength, numbness, or tremors  SKIN: No itching, burning, rashes, or lesions   LYMPH Nodes: No enlarged glands  ENDOCRINE: No heat or cold intolerance; No hair loss  MUSCULOSKELETAL: No joint pain or swelling; No muscle, back, or extremity pain  PSYCHIATRIC: No depression, anxiety, mood swings, or difficulty sleeping  HEME/LYMPH: No easy bruising, or bleeding gums  ALLERGY AND IMMUNOLOGIC: No hives or eczema	    [ ] All others negative	  [ ] Unable to obtain    PHYSICAL EXAM:  T(C): 36.7 (09-06-21 @ 07:19), Max: 36.8 (09-05-21 @ 18:55)  HR: 91 (09-06-21 @ 07:19) (83 - 111)  BP: 99/68 (09-06-21 @ 07:19) (99/68 - 130/58)  RR: 18 (09-06-21 @ 07:19) (17 - 22)  SpO2: 91% (09-06-21 @ 07:19) (91% - 97%)  Wt(kg): --  I&O's Summary    05 Sep 2021 07:01  -  06 Sep 2021 07:00  --------------------------------------------------------  IN: 1310 mL / OUT: 875 mL / NET: 435 mL        Appearance: Normal	  HEENT:   Normal oral mucosa, PERRL, EOMI	  Lymphatic: No lymphadenopathy  Cardiovascular: Normal S1 S2, No JVD, +murmurs, No edema  Respiratory: Lungs clear to auscultation	  Psychiatry: A & O x 3, Mood & affect appropriate  Gastrointestinal:  Soft, Non-tender, + BS	  Skin: No rashes, No ecchymoses, No cyanosis	  Neurologic: Non-focal  Extremities: Normal range of motion, No clubbing, cyanosis or edema  Vascular: Peripheral pulses palpable 2+ bilaterally    MEDICATIONS  (STANDING):  aspirin  chewable 81 milliGRAM(s) Oral daily  atorvastatin 40 milliGRAM(s) Oral at bedtime  chlorhexidine 2% Cloths 1 Application(s) Topical <User Schedule>  cilostazol 100 milliGRAM(s) Oral every 12 hours  clotrimazole 1% Cream 1 Application(s) Topical two times a day  enoxaparin Injectable 40 milliGRAM(s) SubCutaneous daily  escitalopram 5 milliGRAM(s) Oral daily  hydrocortisone 1% Cream 1 Application(s) Topical daily  insulin lispro (ADMELOG) corrective regimen sliding scale   SubCutaneous three times a day with meals  levothyroxine 137 MICROGram(s) Oral daily  metoprolol tartrate 50 milliGRAM(s) Oral two times a day  pantoprazole    Tablet 40 milliGRAM(s) Oral before breakfast  polyethylene glycol 3350 17 Gram(s) Oral daily      TELEMETRY: 	    ECG:  	  RADIOLOGY:  OTHER: 	  	  LABS:	 	    CARDIAC MARKERS:                                8.1    7.71  )-----------( 129      ( 06 Sep 2021 06:25 )             25.2     09-06    137  |  103  |  26<H>  ----------------------------<  99  4.4   |  24  |  0.98    Ca    9.2      06 Sep 2021 06:25  Phos  2.5     09-06  Mg     1.9     09-06    TPro  5.7<L>  /  Alb  3.1<L>  /  TBili  0.7  /  DBili  x   /  AST  13  /  ALT  11  /  AlkPhos  81  09-06    proBNP: Serum Pro-Brain Natriuretic Peptide: 1881 pg/mL (08-18 @ 16:10)    Lipid Profile:   HgA1c:   TSH: Thyroid Stimulating Hormone, Serum: 5.02 uIU/mL (08-25 @ 22:25)    PT/INR - ( 06 Sep 2021 06:25 )   PT: 14.6 sec;   INR: 1.23 ratio         PTT - ( 06 Sep 2021 06:25 )  PTT:26.9 sec  < from: 12 Lead ECG (08.21.21 @ 17:52) >  Diagnosis Line SINUS RHYTHM WITH 1ST DEGREE A-V BLOCK WITH OCCASIONAL PREMATURE VENTRICULAR COMPLEXES  WHEN COMPARED WITH ECG OF 18-AUG-2021 17:29,  SIGNIFICANT CHANGES HAVE OCCURRED  PVC is nted    < from: 12 Lead ECG (09.01.21 @ 00:31) >  Diagnosis Line SINUS RHYTHM WITH 1ST DEGREE A-V BLOCK WITH PREMATURE SUPRAVENTRICULAR COMPLEXES  WHEN COMPARED WITH ECG OF 31-AUG-2021 00:11,  NO SIGNIFICANT CHANGE WAS FOUND    < from: TTE with Doppler (w/Cont) (09.03.21 @ 15:30) >  1. Bioprosthetic mitral valve replacement.   The valve is  well seated.  Minimal mitral regurgitation. Peak mitral  valve gradient equals 8 mm Hg, mean transmitral valve  gradient equals 4 mm Hg, which is probably normal in the  setting of a bioprosthetic mitral valve replacement.  Gradients measured at a HR of 82bpm.  2. Normal trileaflet aortic valve. Mild aortic  regurgitation.  3. Endocardial visualization enhanced with intravenous  injection of Ultrasonic Enhancing Agent (Definity). No left  ventricular thrombus. Normal left ventricular systolic  function. No segmental wall motion abnormalities. Septal  motion consistent with cardiac surgery.  4. The right ventricle is not well visualized.    < from: Xray Chest 1 View- PORTABLE-Routine (Xray Chest 1 View- PORTABLE-Routine in AM.) (09.05.21 @ 02:08) >  The heart is enlarged. Left pleural effusion. Left lower lobe pneumonia and/or atelectasis. The right lung is clear. Post mitral valve replacement. A pacer is in good position. A left atrial appendage clip is present. Status post sternotomy.    < end of copied text >      Assessment and plan  ---------------------------  s/p CABG 2 vessels and MVR/ bio  post op bleeding, s/p blood transfusion  extubated  hgb stable  ppm noted hr has decreased to hr  to 600 jose 120 AAAIR/ DDDR  s/p extubation  ?AC post MVR as per ct surgery  need to change PPM  battery prior to Discharge  oob to  chair  physical therapy  PPM almost EOL, plan to change the ppm battery prior to dc ? Tuesday as long as inr less than 1.8  pt with hx of PAF, now in a.fib ?amio load will consider possible cardioversion latter on  a.fib hr is well controlled  decrease hgb ?source of bleeding,  check guaiac, chest x ray  start iv heparin for ac  if clear bu ct surgery, s/p clip ANAHI  ?ppm battery change tomorrow  fu hgb

## 2021-09-06 NOTE — PROGRESS NOTE ADULT - SUBJECTIVE AND OBJECTIVE BOX
INTERVAL HPI/OVERNIGHT EVENTS:  s/p CABG/MVR,   transfer to floor   no new events      MEDICATIONS  (PRN):      Allergies    alfuzosin (Hives)  levofloxacin (Hives)  Myrbetriq (Hives)  tamsulosin (Hives)    Intolerances      ROS   unable to obtain         PHYSICAL EXAM:   Vital Signs:  Vital Signs Last 24 Hrs  T(C): 37.1 (29 Aug 2021 04:21), Max: 37.1 (29 Aug 2021 04:21)  T(F): 98.7 (29 Aug 2021 04:21), Max: 98.7 (29 Aug 2021 04:21)  HR: 55 (29 Aug 2021 04:21) (55 - 78)  BP: 116/61 (29 Aug 2021 04:21) (116/61 - 146/63)  BP(mean): --  RR: 18 (29 Aug 2021 04:21) (18 - 18)  SpO2: 93% (29 Aug 2021 04:21) (93% - 96%)  Daily     Daily Weight in k.2 (29 Aug 2021 07:30)I&O's Summary    28 Aug 2021 07:  -  29 Aug 2021 07:00  --------------------------------------------------------  IN: 1110 mL / OUT: 5 mL / NET: -915 mL    29 Aug 2021 07:  -  29 Aug 2021 10:27  --------------------------------------------------------  IN: 240 mL / OUT: 800 mL / NET: -560 mL        GENERAL:  Appears stated age, well-groomed, well-nourished, no distress  HEENT:  NC/AT,  conjunctivae clear and pink, no thyromegaly, nodules, adenopathy, no JVD, sclera -anicteric  CHEST:  Full & symmetric excursion, no increased effort, breath sounds clear  HEART:  Regular rhythm, S1, S2, no murmur/rub/S3/S4, no abdominal bruit, no edema  ABDOMEN:  Soft, non-tender, non-distended, normoactive bowel sounds,  no masses ,no hepato-splenomegaly, no signs of chronic liver disease  EXTEREMITIES:  no cyanosis,clubbing or edema  SKIN:  No rash/erythema/ecchymoses/petechiae/wounds/abscess/warm/dry  NEURO:  Alert, oriented, no asterixis, no tremor, no encephalopathy      LABS:                        11.5   5.65  )-----------( 138      ( 29 Aug 2021 06:30 )             36.1         139  |  105  |  44<H>  ----------------------------<  113<H>  4.5   |  21<L>  |  1.06    Ca    9.8      29 Aug 2021 06:30          amylase   lipase  RADIOLOGY & ADDITIONAL TESTS:

## 2021-09-06 NOTE — PROGRESS NOTE ADULT - SUBJECTIVE AND OBJECTIVE BOX
VITAL SIGNS-Telemetry:  afib 90's up to 120s  Vital Signs Last 24 Hrs  T(C): 36.7 (21 @ 07:19), Max: 36.8 (21 @ 18:55)  T(F): 98.1 (21 @ 07:19), Max: 98.2 (21 @ 18:55)  HR: 91 (21 @ 07:19) (83 - 111)  BP: 99/68 (21 @ 07:19) (99/68 - 130/58)  RR: 18 (21 @ 07:19) (17 - 20)  SpO2: 91% (21 @ 07:19) (91% - 97%)          @ 07:01  -   @ 07:00  --------------------------------------------------------  IN: 1310 mL / OUT: 875 mL / NET: 435 mL     @ 07:01  -   @ 11:12  --------------------------------------------------------  IN: 360 mL / OUT: 0 mL / NET: 360 mL    Daily     Daily Weight in k.5 (06 Sep 2021 06:00)    Pacing Wires        [  ]   Settings:                                  Isolated  [ x ]  Coumadin    [x ] YES          [  ]      NO         Reason:     af  PHYSICAL EXAM:  Neurology: alert and oriented x 3, nonfocal, no gross deficits  CV : IRR IRR  Sternal Wound :  CDI , Stable  Lungs: cta  Abdomen: soft, nontender, nondistended, positive bowel sounds, last bowel movement         Extremities:     + edema LLE . LLE shorter than RLE, no calf tenderness    acetaminophen   Tablet .. 650 milliGRAM(s) Oral every 6 hours PRN  aspirin  chewable 81 milliGRAM(s) Oral daily  atorvastatin 40 milliGRAM(s) Oral at bedtime  chlorhexidine 2% Cloths 1 Application(s) Topical <User Schedule>  cilostazol 100 milliGRAM(s) Oral every 12 hours  clotrimazole 1% Cream 1 Application(s) Topical two times a day  enoxaparin Injectable 40 milliGRAM(s) SubCutaneous daily  escitalopram 5 milliGRAM(s) Oral daily  hydrocortisone 1% Cream 1 Application(s) Topical daily  levothyroxine 137 MICROGram(s) Oral daily  metoprolol tartrate 50 milliGRAM(s) Oral two times a day  pantoprazole    Tablet 40 milliGRAM(s) Oral before breakfast  polyethylene glycol 3350 17 Gram(s) Oral daily    Physical Therapy Rec:   Home  [  ]   Home w/ PT  [x  ]  Rehab  [  ]  Discussed with Cardiothoracic Team at AM rounds.

## 2021-09-06 NOTE — PROGRESS NOTE ADULT - ASSESSMENT
77 yo  h/o PPM , CAD.  LYMPHEDEMA. HTN, MR,  a.fib on no ac secondary to frequent falls, PVD on pletal presents with SOB. Cardiac workup demonstrates severe MR and multivessel disease.    On 21, pt underwent MVR-t/CABG x 2 w/ LIMA  intraop & post-op bleeding w/ multiple products given ? TRALI-hypoxic- lasix gtt-     EP increased back up rate to 60 - .    gtt - rapid afib -  d/c'd, bb   lasix gtt d/c'd - EXTUBATED.    SDU - BB Increased.  -battery end-of-life - will change battery on  if INR < 1.8 per DR. Christensen   VSS afib 90s  - low dose coumadin - NPO for generator change PPM w/ DR. Christensen.  ck inr in am   d/c plan

## 2021-09-07 LAB
ALBUMIN SERPL ELPH-MCNC: 3 G/DL — LOW (ref 3.3–5)
ALP SERPL-CCNC: 80 U/L — SIGNIFICANT CHANGE UP (ref 40–120)
ALT FLD-CCNC: 10 U/L — SIGNIFICANT CHANGE UP (ref 10–45)
ANION GAP SERPL CALC-SCNC: 12 MMOL/L — SIGNIFICANT CHANGE UP (ref 5–17)
AST SERPL-CCNC: 12 U/L — SIGNIFICANT CHANGE UP (ref 10–40)
BILIRUB SERPL-MCNC: 0.6 MG/DL — SIGNIFICANT CHANGE UP (ref 0.2–1.2)
BUN SERPL-MCNC: 24 MG/DL — HIGH (ref 7–23)
CALCIUM SERPL-MCNC: 8.9 MG/DL — SIGNIFICANT CHANGE UP (ref 8.4–10.5)
CHLORIDE SERPL-SCNC: 103 MMOL/L — SIGNIFICANT CHANGE UP (ref 96–108)
CO2 SERPL-SCNC: 22 MMOL/L — SIGNIFICANT CHANGE UP (ref 22–31)
CREAT SERPL-MCNC: 0.95 MG/DL — SIGNIFICANT CHANGE UP (ref 0.5–1.3)
GLUCOSE SERPL-MCNC: 108 MG/DL — HIGH (ref 70–99)
HCT VFR BLD CALC: 24.1 % — LOW (ref 39–50)
HGB BLD-MCNC: 7.8 G/DL — LOW (ref 13–17)
INR BLD: 1.29 RATIO — HIGH (ref 0.88–1.16)
MAGNESIUM SERPL-MCNC: 1.8 MG/DL — SIGNIFICANT CHANGE UP (ref 1.6–2.6)
MCHC RBC-ENTMCNC: 30.1 PG — SIGNIFICANT CHANGE UP (ref 27–34)
MCHC RBC-ENTMCNC: 32.4 GM/DL — SIGNIFICANT CHANGE UP (ref 32–36)
MCV RBC AUTO: 93.1 FL — SIGNIFICANT CHANGE UP (ref 80–100)
NRBC # BLD: 0 /100 WBCS — SIGNIFICANT CHANGE UP (ref 0–0)
PHOSPHATE SERPL-MCNC: 2.6 MG/DL — SIGNIFICANT CHANGE UP (ref 2.5–4.5)
PLATELET # BLD AUTO: 137 K/UL — LOW (ref 150–400)
POTASSIUM SERPL-MCNC: 4 MMOL/L — SIGNIFICANT CHANGE UP (ref 3.5–5.3)
POTASSIUM SERPL-SCNC: 4 MMOL/L — SIGNIFICANT CHANGE UP (ref 3.5–5.3)
PROT SERPL-MCNC: 5.2 G/DL — LOW (ref 6–8.3)
PROTHROM AB SERPL-ACNC: 15.3 SEC — HIGH (ref 10.6–13.6)
RBC # BLD: 2.59 M/UL — LOW (ref 4.2–5.8)
RBC # FLD: 14.5 % — SIGNIFICANT CHANGE UP (ref 10.3–14.5)
SODIUM SERPL-SCNC: 137 MMOL/L — SIGNIFICANT CHANGE UP (ref 135–145)
WBC # BLD: 6.81 K/UL — SIGNIFICANT CHANGE UP (ref 3.8–10.5)
WBC # FLD AUTO: 6.81 K/UL — SIGNIFICANT CHANGE UP (ref 3.8–10.5)

## 2021-09-07 PROCEDURE — 71045 X-RAY EXAM CHEST 1 VIEW: CPT | Mod: 26

## 2021-09-07 RX ORDER — FERROUS SULFATE 325(65) MG
325 TABLET ORAL DAILY
Refills: 0 | Status: DISCONTINUED | OUTPATIENT
Start: 2021-09-07 | End: 2021-09-16

## 2021-09-07 RX ORDER — METOPROLOL TARTRATE 50 MG
100 TABLET ORAL DAILY
Refills: 0 | Status: DISCONTINUED | OUTPATIENT
Start: 2021-09-07 | End: 2021-09-16

## 2021-09-07 RX ORDER — ASCORBIC ACID 60 MG
500 TABLET,CHEWABLE ORAL
Refills: 0 | Status: DISCONTINUED | OUTPATIENT
Start: 2021-09-07 | End: 2021-09-16

## 2021-09-07 RX ORDER — CEFAZOLIN SODIUM 1 G
1000 VIAL (EA) INJECTION EVERY 8 HOURS
Refills: 0 | Status: COMPLETED | OUTPATIENT
Start: 2021-09-07 | End: 2021-09-08

## 2021-09-07 RX ORDER — SENNA PLUS 8.6 MG/1
2 TABLET ORAL AT BEDTIME
Refills: 0 | Status: DISCONTINUED | OUTPATIENT
Start: 2021-09-07 | End: 2021-09-16

## 2021-09-07 RX ORDER — FOLIC ACID 0.8 MG
1 TABLET ORAL DAILY
Refills: 0 | Status: DISCONTINUED | OUTPATIENT
Start: 2021-09-07 | End: 2021-09-16

## 2021-09-07 RX ORDER — WARFARIN SODIUM 2.5 MG/1
5 TABLET ORAL ONCE
Refills: 0 | Status: COMPLETED | OUTPATIENT
Start: 2021-09-07 | End: 2021-09-07

## 2021-09-07 RX ADMIN — Medication 75 MILLIGRAM(S): at 05:26

## 2021-09-07 RX ADMIN — ATORVASTATIN CALCIUM 40 MILLIGRAM(S): 80 TABLET, FILM COATED ORAL at 21:39

## 2021-09-07 RX ADMIN — CHLORHEXIDINE GLUCONATE 1 APPLICATION(S): 213 SOLUTION TOPICAL at 12:28

## 2021-09-07 RX ADMIN — Medication 1 APPLICATION(S): at 12:33

## 2021-09-07 RX ADMIN — Medication 1 APPLICATION(S): at 05:31

## 2021-09-07 RX ADMIN — CILOSTAZOL 100 MILLIGRAM(S): 100 TABLET ORAL at 17:47

## 2021-09-07 RX ADMIN — CILOSTAZOL 100 MILLIGRAM(S): 100 TABLET ORAL at 05:29

## 2021-09-07 RX ADMIN — Medication 325 MILLIGRAM(S): at 17:49

## 2021-09-07 RX ADMIN — Medication 100 MILLIGRAM(S): at 21:37

## 2021-09-07 RX ADMIN — Medication 1 APPLICATION(S): at 17:47

## 2021-09-07 RX ADMIN — WARFARIN SODIUM 5 MILLIGRAM(S): 2.5 TABLET ORAL at 21:39

## 2021-09-07 RX ADMIN — Medication 500 MILLIGRAM(S): at 17:49

## 2021-09-07 RX ADMIN — ESCITALOPRAM OXALATE 5 MILLIGRAM(S): 10 TABLET, FILM COATED ORAL at 12:33

## 2021-09-07 RX ADMIN — PANTOPRAZOLE SODIUM 40 MILLIGRAM(S): 20 TABLET, DELAYED RELEASE ORAL at 05:26

## 2021-09-07 RX ADMIN — Medication 137 MICROGRAM(S): at 05:27

## 2021-09-07 RX ADMIN — Medication 100 MILLIGRAM(S): at 14:06

## 2021-09-07 RX ADMIN — Medication 1 MILLIGRAM(S): at 17:49

## 2021-09-07 RX ADMIN — Medication 81 MILLIGRAM(S): at 12:33

## 2021-09-07 NOTE — PROGRESS NOTE ADULT - SUBJECTIVE AND OBJECTIVE BOX
INTERVAL HPI/OVERNIGHT EVENTS:  s/p CABG/MVR,   no new events  PPM generator change today       MEDICATIONS  (PRN):      Allergies    alfuzosin (Hives)  levofloxacin (Hives)  Myrbetriq (Hives)  tamsulosin (Hives)    Intolerances      ROS   unable to obtain         PHYSICAL EXAM:   Vital Signs:  Vital Signs Last 24 Hrs  T(C): 37.1 (29 Aug 2021 04:21), Max: 37.1 (29 Aug 2021 04:21)  T(F): 98.7 (29 Aug 2021 04:21), Max: 98.7 (29 Aug 2021 04:21)  HR: 55 (29 Aug 2021 04:21) (55 - 78)  BP: 116/61 (29 Aug 2021 04:21) (116/61 - 146/63)  BP(mean): --  RR: 18 (29 Aug 2021 04:21) (18 - 18)  SpO2: 93% (29 Aug 2021 04:21) (93% - 96%)  Daily     Daily Weight in k.2 (29 Aug 2021 07:30)I&O's Summary    28 Aug 2021 07:  -  29 Aug 2021 07:00  --------------------------------------------------------  IN: 1110 mL / OUT: 5 mL / NET: -915 mL    29 Aug 2021 07:  -  29 Aug 2021 10:27  --------------------------------------------------------  IN: 240 mL / OUT: 800 mL / NET: -560 mL        GENERAL:  Appears stated age, well-groomed, well-nourished, no distress  HEENT:  NC/AT,  conjunctivae clear and pink, no thyromegaly, nodules, adenopathy, no JVD, sclera -anicteric  CHEST:  Full & symmetric excursion, no increased effort, breath sounds clear  HEART:  Regular rhythm, S1, S2, no murmur/rub/S3/S4, no abdominal bruit, no edema  ABDOMEN:  Soft, non-tender, non-distended, normoactive bowel sounds,  no masses ,no hepato-splenomegaly, no signs of chronic liver disease  EXTEREMITIES:  no cyanosis,clubbing or edema  SKIN:  No rash/erythema/ecchymoses/petechiae/wounds/abscess/warm/dry  NEURO:  Alert, oriented, no asterixis, no tremor, no encephalopathy      LABS:                        11.5   5.65  )-----------( 138      ( 29 Aug 2021 06:30 )             36.1         139  |  105  |  44<H>  ----------------------------<  113<H>  4.5   |  21<L>  |  1.06    Ca    9.8      29 Aug 2021 06:30          amylase   lipase  RADIOLOGY & ADDITIONAL TESTS:

## 2021-09-07 NOTE — PROGRESS NOTE ADULT - SUBJECTIVE AND OBJECTIVE BOX
afberile    REVIEW OF SYSTEMS:  GEN: no fever,    no chills  RESP: no SOB,   no cough  CVS: no chest pain,   no palpitations  GI: no abdominal pain,   no nausea,   no vomiting,   no constipation,   no diarrhea  : no dysuria,   no frequency  NEURO: no headache,   no dizziness  PSYCH: no depression,   not anxious  Derm : no rash    MEDICATIONS  (STANDING):  aspirin  chewable 81 milliGRAM(s) Oral daily  atorvastatin 40 milliGRAM(s) Oral at bedtime  chlorhexidine 2% Cloths 1 Application(s) Topical <User Schedule>  cilostazol 100 milliGRAM(s) Oral every 12 hours  clotrimazole 1% Cream 1 Application(s) Topical two times a day  enoxaparin Injectable 40 milliGRAM(s) SubCutaneous daily  escitalopram 5 milliGRAM(s) Oral daily  hydrocortisone 1% Cream 1 Application(s) Topical daily  levothyroxine 137 MICROGram(s) Oral daily  metoprolol succinate  milliGRAM(s) Oral daily  pantoprazole    Tablet 40 milliGRAM(s) Oral before breakfast  polyethylene glycol 3350 17 Gram(s) Oral daily    MEDICATIONS  (PRN):  acetaminophen   Tablet .. 650 milliGRAM(s) Oral every 6 hours PRN Mild Pain (1 - 3)      Vital Signs Last 24 Hrs  T(C): 36.6 (07 Sep 2021 06:58), Max: 36.7 (06 Sep 2021 15:14)  T(F): 97.9 (07 Sep 2021 06:58), Max: 98.1 (06 Sep 2021 18:55)  HR: 81 (07 Sep 2021 06:58) (78 - 107)  BP: 98/58 (07 Sep 2021 06:58) (93/63 - 123/61)  BP(mean): 72 (07 Sep 2021 06:58) (72 - 83)  RR: 18 (07 Sep 2021 06:58) (17 - 18)  SpO2: 94% (07 Sep 2021 06:58) (89% - 97%)  CAPILLARY BLOOD GLUCOSE        I&O's Summary    06 Sep 2021 07:01  -  07 Sep 2021 07:00  --------------------------------------------------------  IN: 1715 mL / OUT: 1275 mL / NET: 440 mL    07 Sep 2021 07:01  -  07 Sep 2021 11:24  --------------------------------------------------------  IN: 0 mL / OUT: 300 mL / NET: -300 mL        PHYSICAL EXAM:  HEAD:  Atraumatic, Normocephalic  NECK: Supple, No   JVD  CHEST/LUNG:   no     rales,     no,    rhonchi  HEART: Regular rate and rhythm;         murmur  ABDOMEN: Soft, Nontender, ;   EXTREMITIES:        edema  NEUROLOGY:  alert    LABS:                        7.8    6.81  )-----------( 137      ( 07 Sep 2021 06:00 )             24.1     09-07    137  |  103  |  24<H>  ----------------------------<  108<H>  4.0   |  22  |  0.95    Ca    8.9      07 Sep 2021 06:00  Phos  2.6     09-07  Mg     1.8     09-07    TPro  5.2<L>  /  Alb  3.0<L>  /  TBili  0.6  /  DBili  x   /  AST  12  /  ALT  10  /  AlkPhos  80  09-07    PT/INR - ( 07 Sep 2021 06:00 )   PT: 15.3 sec;   INR: 1.29 ratio         PTT - ( 06 Sep 2021 06:25 )  PTT:26.9 sec                Thyroid Stimulating Hormone, Serum: 5.02 uIU/mL (08-25 @ 22:25)          Consultant(s) Notes Reviewed:      Care Discussed with Consultants/Other Providers:

## 2021-09-07 NOTE — PROGRESS NOTE ADULT - SUBJECTIVE AND OBJECTIVE BOX
CARDIOLOGY     PROGRESS  NOTE   ________________________________________________    CHIEF COMPLAINT:Patient is a 76y old  Male who presents with a chief complaint of sob (06 Sep 2021 14:41)  no complain.  	  REVIEW OF SYSTEMS:  CONSTITUTIONAL: No fever, weight loss, or fatigue  EYES: No eye pain, visual disturbances, or discharge  ENT:  No difficulty hearing, tinnitus, vertigo; No sinus or throat pain  NECK: No pain or stiffness  RESPIRATORY: No cough, wheezing, chills or hemoptysis; No Shortness of Breath  CARDIOVASCULAR: No chest pain, palpitations, passing out, dizziness, or leg swelling  GASTROINTESTINAL: No abdominal or epigastric pain. No nausea, vomiting, or hematemesis; No diarrhea or constipation. No melena or hematochezia.  GENITOURINARY: No dysuria, frequency, hematuria, or incontinence  NEUROLOGICAL: No headaches, memory loss, loss of strength, numbness, or tremors  SKIN: No itching, burning, rashes, or lesions   LYMPH Nodes: No enlarged glands  ENDOCRINE: No heat or cold intolerance; No hair loss  MUSCULOSKELETAL: No joint pain or swelling; No muscle, back, or extremity pain  PSYCHIATRIC: No depression, anxiety, mood swings, or difficulty sleeping  HEME/LYMPH: No easy bruising, or bleeding gums  ALLERGY AND IMMUNOLOGIC: No hives or eczema	    [ ] All others negative	  [ ] Unable to obtain    PHYSICAL EXAM:  T(C): 36.6 (09-07-21 @ 06:58), Max: 36.7 (09-06-21 @ 11:21)  HR: 81 (09-07-21 @ 06:58) (78 - 107)  BP: 98/58 (09-07-21 @ 06:58) (93/63 - 123/61)  RR: 18 (09-07-21 @ 06:58) (17 - 18)  SpO2: 94% (09-07-21 @ 06:58) (89% - 97%)  Wt(kg): --  I&O's Summary    06 Sep 2021 07:01  -  07 Sep 2021 07:00  --------------------------------------------------------  IN: 1715 mL / OUT: 1275 mL / NET: 440 mL    07 Sep 2021 07:01  -  07 Sep 2021 09:33  --------------------------------------------------------  IN: 0 mL / OUT: 300 mL / NET: -300 mL        Appearance: Normal	  HEENT:   Normal oral mucosa, PERRL, EOMI	  Lymphatic: No lymphadenopathy  Cardiovascular: Normal S1 S2, No JVD, + murmurs, No edema  Respiratory: Lungs clear to auscultation	  Psychiatry: A & O x 3, Mood & affect appropriate  Gastrointestinal:  Soft, Non-tender, + BS	  Skin: No rashes, No ecchymoses, No cyanosis	  Neurologic: Non-focal  Extremities: Normal range of motion, No clubbing, cyanosis or edema  Vascular: Peripheral pulses palpable 2+ bilaterally    MEDICATIONS  (STANDING):  aspirin  chewable 81 milliGRAM(s) Oral daily  atorvastatin 40 milliGRAM(s) Oral at bedtime  chlorhexidine 2% Cloths 1 Application(s) Topical <User Schedule>  cilostazol 100 milliGRAM(s) Oral every 12 hours  clotrimazole 1% Cream 1 Application(s) Topical two times a day  enoxaparin Injectable 40 milliGRAM(s) SubCutaneous daily  escitalopram 5 milliGRAM(s) Oral daily  hydrocortisone 1% Cream 1 Application(s) Topical daily  levothyroxine 137 MICROGram(s) Oral daily  metoprolol succinate  milliGRAM(s) Oral daily  pantoprazole    Tablet 40 milliGRAM(s) Oral before breakfast  polyethylene glycol 3350 17 Gram(s) Oral daily      TELEMETRY: 	    ECG:  	  RADIOLOGY:  OTHER: 	  	  LABS:	 	    CARDIAC MARKERS:                                7.8    6.81  )-----------( 137      ( 07 Sep 2021 06:00 )             24.1     09-07    137  |  103  |  24<H>  ----------------------------<  108<H>  4.0   |  22  |  0.95    Ca    8.9      07 Sep 2021 06:00  Phos  2.6     09-07  Mg     1.8     09-07    TPro  5.2<L>  /  Alb  3.0<L>  /  TBili  0.6  /  DBili  x   /  AST  12  /  ALT  10  /  AlkPhos  80  09-07    proBNP: Serum Pro-Brain Natriuretic Peptide: 1881 pg/mL (08-18 @ 16:10)    Lipid Profile:   HgA1c:   TSH: Thyroid Stimulating Hormone, Serum: 5.02 uIU/mL (08-25 @ 22:25)    PT/INR - ( 07 Sep 2021 06:00 )   PT: 15.3 sec;   INR: 1.29 ratio         PTT - ( 06 Sep 2021 06:25 )  PTT:26.9 sec      Assessment and plan  ---------------------------  s/p CABG 2 vessels and MVR/ bio  post op bleeding, s/p blood transfusion  extubated  hgb stable  ppm noted hr has decreased to hr  to 600 jose 120 AAAIR/ DDDR  s/p extubation  ?AC post MVR as per ct surgery  need to change PPM  battery prior to Discharge  oob to  chair  physical therapy  PPM almost EOL, plan to change the ppm battery prior to dc ? Tuesday as long as inr less than 1.8  pt with hx of PAF, now in a.fib ?amio load will consider possible cardioversion latter on  a.fib hr is well controlled  decrease hgb ?source of bleeding,  check guaiac, chest x ray  start iv heparin for ac  if clear bu ct surgery, s/p clip ANAHI  ?ppm battery change today  fu hgb

## 2021-09-07 NOTE — PROGRESS NOTE ADULT - ASSESSMENT
76  year old male          h/o   , CAD s/p stent on ASA, A-fib/ PPM, , hypothyroid, and total left knee replacement       s/p rash across the upper torso/ back  for past week, pruritic on R arm, and rash in the groin      prior  PPM  interrogation  with  [facility]/   s/p  brief   bursts  of  afib/ , on prior visit         * admitted with sob,  from acute  diastolic  chf, related  to non compliance  with his  lasix   wound care/  lymphedema,  carla left  leg     *  CAD/ AFIb  /PPM,/ HTN     prior stent in 2007   on  on asa.,  toprol,  cozaar/ lipitor,  synthroid   *h/o  AFIB,    no  a/c       *Anemia, , hb stable, had  been seen by  gi  eval dr joann sanchez in past    right leg with distal redness.  not cellulitis / c/c  for past few  months  an d left leg lymphedema     xray spine. horse  shoe left kidney/ OA  of  spine  on    lasix,  ha  simproved    * Echo, normal  ef/ severe MR     ERENDIRA ,  severe  MR    cath, with 2 vessel disease   plan, CABG and  MVR  surg d r marni   h/o  Afib on  coumadin  on asa.  lipitor, torpol,  synthroid  hb is 7.8,  follow hb in am        f/p wi  dr mikayla dick

## 2021-09-07 NOTE — PROGRESS NOTE ADULT - ASSESSMENT
75 yo  h/o PPM , CAD.  LYMPHEDEMA. HTN, MR,  a.fib on no ac secondary to frequent falls, PVD on pletal presents with SOB. Cardiac workup demonstrates severe MR and multivessel disease.    On 21, pt underwent MVR-t/CABG x 2 w/ LIMA  intraop & post-op bleeding w/ multiple products given ? TRALI-hypoxic- lasix gtt-     EP increased back up rate to 60 - .    gtt - rapid afib -  d/c'd, bb   lasix gtt d/c'd - EXTUBATED.    SDU - BB Increased.  -battery end-of-life - will change battery on  if INR < 1.8 per DR. Christensen   VSS afib 90s  - low dose coumadin - NPO for generator change PPM w/ DR. Christensen.  ck inr in am   d/c plan    Generator change today.  Change to toprol xl 100  Ambulation  Anticoagulation

## 2021-09-07 NOTE — PROGRESS NOTE ADULT - ASSESSMENT
anemia  afib  cad    s/p CABG and MVR 8/30  no overt gi bleed  denies melena/hematochezia  hgb stable  had egd/colon 12/2020 (cher metha)  cbc daily  transfuse PRN  PPM generator change today   cardio following   will follow   dw patient     Advanced care planning was discussed with patient and family.  Advanced care planning forms were reviewed and discussed.  Risks, benefits and alternatives of gastroenterologic procedures were discussed in detail and all questions were answered.    30 minutes spent.

## 2021-09-07 NOTE — PROGRESS NOTE ADULT - SUBJECTIVE AND OBJECTIVE BOX
VITAL SIGNS    Telemetry:  /afib    Vital Signs Last 24 Hrs  T(C): 36.6 (21 @ 06:58), Max: 36.7 (21 @ 11:21)  T(F): 97.9 (21 @ 06:58), Max: 98.1 (21 @ 18:55)  HR: 81 (21 @ 06:58) (78 - 107)  BP: 98/58 (21 @ 06:58) (93/63 - 123/61)  RR: 18 (21 @ 06:58) (17 - 18)  SpO2: 94% (21 @ 06:58) (89% - 97%)                    @ 07:01  -   @ 07:00  --------------------------------------------------------  IN: 1715 mL / OUT: 1275 mL / NET: 440 mL     @ 07:01  -   @ 09:22  --------------------------------------------------------  IN: 0 mL / OUT: 300 mL / NET: -300 mL          Daily     Daily Weight in k.6 (07 Sep 2021 09:17)            CAPILLARY BLOOD GLUCOSE                Pacing Wires        [  ]   Settings:                                  Isolated  [ x ]    Coumadin    [ x] YES          [  ]      NO                                   PHYSICAL EXAM        Neurology: alert and oriented x 3, nonfocal, no gross deficits  CV : s1 s2 RRR  Sternal Wound :  CDI , Stable  Lungs: cta  Abdomen: soft, nontender, nondistended, positive bowel sounds, last bowel movement +               :    voiding    Extremities:      +edema   /  -   calve tenderness ,      R leg  incisions cdi          acetaminophen   Tablet .. 650 milliGRAM(s) Oral every 6 hours PRN  aspirin  chewable 81 milliGRAM(s) Oral daily  atorvastatin 40 milliGRAM(s) Oral at bedtime  chlorhexidine 2% Cloths 1 Application(s) Topical <User Schedule>  cilostazol 100 milliGRAM(s) Oral every 12 hours  clotrimazole 1% Cream 1 Application(s) Topical two times a day  enoxaparin Injectable 40 milliGRAM(s) SubCutaneous daily  escitalopram 5 milliGRAM(s) Oral daily  hydrocortisone 1% Cream 1 Application(s) Topical daily  levothyroxine 137 MICROGram(s) Oral daily  metoprolol succinate  milliGRAM(s) Oral daily  pantoprazole    Tablet 40 milliGRAM(s) Oral before breakfast  polyethylene glycol 3350 17 Gram(s) Oral daily                    Physical Therapy Rec:   Home  [  ]   Home w/ PT  [  ]  Rehab  [  ]  Discussed with Cardiothoracic Team at AM rounds.

## 2021-09-08 LAB
ANION GAP SERPL CALC-SCNC: 11 MMOL/L — SIGNIFICANT CHANGE UP (ref 5–17)
BASOPHILS # BLD AUTO: 0.02 K/UL — SIGNIFICANT CHANGE UP (ref 0–0.2)
BASOPHILS NFR BLD AUTO: 0.3 % — SIGNIFICANT CHANGE UP (ref 0–2)
BUN SERPL-MCNC: 23 MG/DL — SIGNIFICANT CHANGE UP (ref 7–23)
CALCIUM SERPL-MCNC: 9.1 MG/DL — SIGNIFICANT CHANGE UP (ref 8.4–10.5)
CHLORIDE SERPL-SCNC: 102 MMOL/L — SIGNIFICANT CHANGE UP (ref 96–108)
CO2 SERPL-SCNC: 24 MMOL/L — SIGNIFICANT CHANGE UP (ref 22–31)
CREAT SERPL-MCNC: 0.9 MG/DL — SIGNIFICANT CHANGE UP (ref 0.5–1.3)
EOSINOPHIL # BLD AUTO: 0.26 K/UL — SIGNIFICANT CHANGE UP (ref 0–0.5)
EOSINOPHIL NFR BLD AUTO: 3.7 % — SIGNIFICANT CHANGE UP (ref 0–6)
GLUCOSE SERPL-MCNC: 115 MG/DL — HIGH (ref 70–99)
HCT VFR BLD CALC: 26.4 % — LOW (ref 39–50)
HGB BLD-MCNC: 8.2 G/DL — LOW (ref 13–17)
IMM GRANULOCYTES NFR BLD AUTO: 1 % — SIGNIFICANT CHANGE UP (ref 0–1.5)
INR BLD: 1.36 RATIO — HIGH (ref 0.88–1.16)
LYMPHOCYTES # BLD AUTO: 1.06 K/UL — SIGNIFICANT CHANGE UP (ref 1–3.3)
LYMPHOCYTES # BLD AUTO: 15.3 % — SIGNIFICANT CHANGE UP (ref 13–44)
MCHC RBC-ENTMCNC: 29.2 PG — SIGNIFICANT CHANGE UP (ref 27–34)
MCHC RBC-ENTMCNC: 31.1 GM/DL — LOW (ref 32–36)
MCV RBC AUTO: 94 FL — SIGNIFICANT CHANGE UP (ref 80–100)
MONOCYTES # BLD AUTO: 0.77 K/UL — SIGNIFICANT CHANGE UP (ref 0–0.9)
MONOCYTES NFR BLD AUTO: 11.1 % — SIGNIFICANT CHANGE UP (ref 2–14)
NEUTROPHILS # BLD AUTO: 4.76 K/UL — SIGNIFICANT CHANGE UP (ref 1.8–7.4)
NEUTROPHILS NFR BLD AUTO: 68.6 % — SIGNIFICANT CHANGE UP (ref 43–77)
NRBC # BLD: 0 /100 WBCS — SIGNIFICANT CHANGE UP (ref 0–0)
PLATELET # BLD AUTO: 160 K/UL — SIGNIFICANT CHANGE UP (ref 150–400)
POTASSIUM SERPL-MCNC: 4.3 MMOL/L — SIGNIFICANT CHANGE UP (ref 3.5–5.3)
POTASSIUM SERPL-SCNC: 4.3 MMOL/L — SIGNIFICANT CHANGE UP (ref 3.5–5.3)
PROTHROM AB SERPL-ACNC: 16.1 SEC — HIGH (ref 10.6–13.6)
RBC # BLD: 2.81 M/UL — LOW (ref 4.2–5.8)
RBC # FLD: 14.6 % — HIGH (ref 10.3–14.5)
SODIUM SERPL-SCNC: 137 MMOL/L — SIGNIFICANT CHANGE UP (ref 135–145)
WBC # BLD: 6.94 K/UL — SIGNIFICANT CHANGE UP (ref 3.8–10.5)
WBC # FLD AUTO: 6.94 K/UL — SIGNIFICANT CHANGE UP (ref 3.8–10.5)

## 2021-09-08 PROCEDURE — 71250 CT THORAX DX C-: CPT | Mod: 26

## 2021-09-08 RX ORDER — HEPARIN SODIUM 5000 [USP'U]/ML
5000 INJECTION INTRAVENOUS; SUBCUTANEOUS EVERY 8 HOURS
Refills: 0 | Status: DISCONTINUED | OUTPATIENT
Start: 2021-09-08 | End: 2021-09-12

## 2021-09-08 RX ORDER — WARFARIN SODIUM 2.5 MG/1
5 TABLET ORAL ONCE
Refills: 0 | Status: COMPLETED | OUTPATIENT
Start: 2021-09-08 | End: 2021-09-08

## 2021-09-08 RX ORDER — POTASSIUM CHLORIDE 20 MEQ
20 PACKET (EA) ORAL DAILY
Refills: 0 | Status: DISCONTINUED | OUTPATIENT
Start: 2021-09-08 | End: 2021-09-11

## 2021-09-08 RX ORDER — FUROSEMIDE 40 MG
40 TABLET ORAL ONCE
Refills: 0 | Status: COMPLETED | OUTPATIENT
Start: 2021-09-08 | End: 2021-09-08

## 2021-09-08 RX ORDER — FUROSEMIDE 40 MG
40 TABLET ORAL DAILY
Refills: 0 | Status: DISCONTINUED | OUTPATIENT
Start: 2021-09-09 | End: 2021-09-12

## 2021-09-08 RX ADMIN — ATORVASTATIN CALCIUM 40 MILLIGRAM(S): 80 TABLET, FILM COATED ORAL at 21:43

## 2021-09-08 RX ADMIN — Medication 1 APPLICATION(S): at 10:57

## 2021-09-08 RX ADMIN — CHLORHEXIDINE GLUCONATE 1 APPLICATION(S): 213 SOLUTION TOPICAL at 05:23

## 2021-09-08 RX ADMIN — SENNA PLUS 2 TABLET(S): 8.6 TABLET ORAL at 21:43

## 2021-09-08 RX ADMIN — Medication 1 MILLIGRAM(S): at 10:56

## 2021-09-08 RX ADMIN — Medication 325 MILLIGRAM(S): at 10:56

## 2021-09-08 RX ADMIN — POLYETHYLENE GLYCOL 3350 17 GRAM(S): 17 POWDER, FOR SOLUTION ORAL at 10:55

## 2021-09-08 RX ADMIN — Medication 100 MILLIGRAM(S): at 05:17

## 2021-09-08 RX ADMIN — Medication 20 MILLIEQUIVALENT(S): at 10:59

## 2021-09-08 RX ADMIN — ESCITALOPRAM OXALATE 5 MILLIGRAM(S): 10 TABLET, FILM COATED ORAL at 10:55

## 2021-09-08 RX ADMIN — WARFARIN SODIUM 5 MILLIGRAM(S): 2.5 TABLET ORAL at 21:42

## 2021-09-08 RX ADMIN — Medication 1 APPLICATION(S): at 17:38

## 2021-09-08 RX ADMIN — Medication 81 MILLIGRAM(S): at 10:55

## 2021-09-08 RX ADMIN — Medication 40 MILLIGRAM(S): at 10:53

## 2021-09-08 RX ADMIN — HEPARIN SODIUM 5000 UNIT(S): 5000 INJECTION INTRAVENOUS; SUBCUTANEOUS at 21:43

## 2021-09-08 RX ADMIN — Medication 100 MILLIGRAM(S): at 05:18

## 2021-09-08 RX ADMIN — CILOSTAZOL 100 MILLIGRAM(S): 100 TABLET ORAL at 05:18

## 2021-09-08 RX ADMIN — Medication 1 APPLICATION(S): at 05:16

## 2021-09-08 RX ADMIN — PANTOPRAZOLE SODIUM 40 MILLIGRAM(S): 20 TABLET, DELAYED RELEASE ORAL at 05:16

## 2021-09-08 RX ADMIN — Medication 137 MICROGRAM(S): at 05:18

## 2021-09-08 RX ADMIN — Medication 500 MILLIGRAM(S): at 17:33

## 2021-09-08 RX ADMIN — CILOSTAZOL 100 MILLIGRAM(S): 100 TABLET ORAL at 17:33

## 2021-09-08 RX ADMIN — Medication 500 MILLIGRAM(S): at 05:18

## 2021-09-08 NOTE — PROGRESS NOTE ADULT - ASSESSMENT
76  year old male          h/o   , CAD s/p stent on ASA, A-fib/ PPM, , hypothyroid, and total left knee replacement       s/p rash across the upper torso/ back  for past week, pruritic on R arm, and rash in the groin      prior  PPM  interrogation  with  [facility]/   s/p  brief   bursts  of  afib/ , on prior visit         * admitted with sob,  from acute  diastolic  chf, related  to non compliance  with his  lasix   wound care/  lymphedema,  carla left  leg     *  CAD/ AFIb  /PPM,/ HTN     prior stent in 2007   on  on asa.,  toprol,  cozaar/ lipitor,  synthroid   *h/o  AFIB,    no  a/c       *Anemia, , hb stable, had  been seen by  gi  eval dr joann sanchez in past    right leg with distal redness.  not cellulitis / c/c  for past few  months  an d left leg lymphedema     xray spine. horse  shoe left kidney/ OA  of  spine  on    lasix,  ha  simproved    * Echo, normal  ef/ severe MR     ERENDIRA ,  severe  MR    cath, with 2 vessel disease   plan, CABG and  MVR  surg d r marni   h/o  Afib on  coumadin  on asa.  lipitor, torpol,  synthroid  hb is   8/  had  mild  sob yesterday, cxr, no  chf/  pt ha s been  ambulating        f/p wih  dr mikayla dick

## 2021-09-08 NOTE — PROGRESS NOTE ADULT - SUBJECTIVE AND OBJECTIVE BOX
afebrile/ mild  sob    REVIEW OF SYSTEMS:  GEN: no fever,    no chills  RESP: no SOB,   no cough  CVS: no chest pain,   no palpitations  GI: no abdominal pain,   no nausea,   no vomiting,   no constipation,   no diarrhea  : no dysuria,   no frequency  NEURO: no headache,   no dizziness  PSYCH: no depression,   not anxious  Derm : no rash    MEDICATIONS  (STANDING):  ascorbic acid 500 milliGRAM(s) Oral two times a day  aspirin  chewable 81 milliGRAM(s) Oral daily  atorvastatin 40 milliGRAM(s) Oral at bedtime  chlorhexidine 2% Cloths 1 Application(s) Topical <User Schedule>  cilostazol 100 milliGRAM(s) Oral every 12 hours  clotrimazole 1% Cream 1 Application(s) Topical two times a day  enoxaparin Injectable 40 milliGRAM(s) SubCutaneous daily  escitalopram 5 milliGRAM(s) Oral daily  ferrous    sulfate 325 milliGRAM(s) Oral daily  folic acid 1 milliGRAM(s) Oral daily  hydrocortisone 1% Cream 1 Application(s) Topical daily  levothyroxine 137 MICROGram(s) Oral daily  metoprolol succinate  milliGRAM(s) Oral daily  pantoprazole    Tablet 40 milliGRAM(s) Oral before breakfast  polyethylene glycol 3350 17 Gram(s) Oral daily  senna 2 Tablet(s) Oral at bedtime    MEDICATIONS  (PRN):  acetaminophen   Tablet .. 650 milliGRAM(s) Oral every 6 hours PRN Mild Pain (1 - 3)      Vital Signs Last 24 Hrs  T(C): 36.8 (08 Sep 2021 06:53), Max: 37.1 (07 Sep 2021 15:00)  T(F): 98.3 (08 Sep 2021 06:53), Max: 98.7 (07 Sep 2021 15:00)  HR: 92 (08 Sep 2021 06:53) (84 - 99)  BP: 120/60 (08 Sep 2021 06:53) (97/54 - 120/60)  BP(mean): 84 (08 Sep 2021 06:53) (69 - 86)  RR: 18 (08 Sep 2021 06:53) (18 - 20)  SpO2: 96% (08 Sep 2021 06:53) (90% - 97%)  CAPILLARY BLOOD GLUCOSE        I&O's Summary    07 Sep 2021 07:01  -  08 Sep 2021 07:00  --------------------------------------------------------  IN: 200 mL / OUT: 1025 mL / NET: -825 mL        PHYSICAL EXAM:  HEAD:  Atraumatic, Normocephalic  NECK: Supple, No   JVD  CHEST/LUNG:   no     rales,     no,    rhonchi  HEART: Regular rate and rhythm;         murmur  ABDOMEN: Soft, Nontender, ;   EXTREMITIES:        edema  NEUROLOGY:  alert    LABS:                        8.2    6.94  )-----------( 160      ( 08 Sep 2021 05:41 )             26.4     09-08    137  |  102  |  23  ----------------------------<  115<H>  4.3   |  24  |  0.90    Ca    9.1      08 Sep 2021 05:41  Phos  2.6     09-07  Mg     1.8     09-07    TPro  5.2<L>  /  Alb  3.0<L>  /  TBili  0.6  /  DBili  x   /  AST  12  /  ALT  10  /  AlkPhos  80  09-07    PT/INR - ( 08 Sep 2021 05:41 )   PT: 16.1 sec;   INR: 1.36 ratio                         Thyroid Stimulating Hormone, Serum: 5.02 uIU/mL (08-25 @ 22:25)          Consultant(s) Notes Reviewed:      Care Discussed with Consultants/Other Providers:

## 2021-09-08 NOTE — PROGRESS NOTE ADULT - SUBJECTIVE AND OBJECTIVE BOX
INTERVAL HPI/OVERNIGHT EVENTS:  s/p CABG/MVR,   no new events        MEDICATIONS  (PRN):      Allergies    alfuzosin (Hives)  levofloxacin (Hives)  Myrbetriq (Hives)  tamsulosin (Hives)    Intolerances      ROS   unable to obtain         PHYSICAL EXAM:   Vital Signs:  Vital Signs Last 24 Hrs  T(C): 37.1 (29 Aug 2021 04:21), Max: 37.1 (29 Aug 2021 04:21)  T(F): 98.7 (29 Aug 2021 04:21), Max: 98.7 (29 Aug 2021 04:21)  HR: 55 (29 Aug 2021 04:21) (55 - 78)  BP: 116/61 (29 Aug 2021 04:21) (116/61 - 146/63)  BP(mean): --  RR: 18 (29 Aug 2021 04:21) (18 - 18)  SpO2: 93% (29 Aug 2021 04:21) (93% - 96%)  Daily     Daily Weight in k.2 (29 Aug 2021 07:30)I&O's Summary    28 Aug 2021 07:  -  29 Aug 2021 07:00  --------------------------------------------------------  IN: 1110 mL / OUT: 5 mL / NET: -915 mL    29 Aug 2021 07:01  -  29 Aug 2021 10:27  --------------------------------------------------------  IN: 240 mL / OUT: 800 mL / NET: -560 mL        GENERAL:  Appears stated age, well-groomed, well-nourished, no distress  HEENT:  NC/AT,  conjunctivae clear and pink, no thyromegaly, nodules, adenopathy, no JVD, sclera -anicteric  CHEST:  Full & symmetric excursion, no increased effort, breath sounds clear  HEART:  Regular rhythm, S1, S2, no murmur/rub/S3/S4, no abdominal bruit, no edema  ABDOMEN:  Soft, non-tender, non-distended, normoactive bowel sounds,  no masses ,no hepato-splenomegaly, no signs of chronic liver disease  EXTEREMITIES:  no cyanosis,clubbing or edema  SKIN:  No rash/erythema/ecchymoses/petechiae/wounds/abscess/warm/dry  NEURO:  Alert, oriented, no asterixis, no tremor, no encephalopathy      LABS:                        11.5   5.65  )-----------( 138      ( 29 Aug 2021 06:30 )             36.1         139  |  105  |  44<H>  ----------------------------<  113<H>  4.5   |  21<L>  |  1.06    Ca    9.8      29 Aug 2021 06:30          amylase   lipase  RADIOLOGY & ADDITIONAL TESTS:

## 2021-09-08 NOTE — CHART NOTE - NSCHARTNOTEFT_GEN_A_CORE
CT scan reviewed with  Dr Daniel  , no further intervention at this time.  Continue coumadin, can eventually transition to eliquis as outpatient in 2-3 mos.
Called by primary team from CTS to turn lower rate of patient's pacemaker from 80 bmp to 60 bmp.     Rate changed to 60 bmp.
JANEE ARCEO  MRN-[id number]    Called by CT surgery overnight team, asked to adjust back-up rate to 60 bpm from previous 55 bpm.    Briefly, 77yo M h/o hypothyroid, HTN, CAD (s/p PCI 2007), afib, PPM ([employer] 01/2012), originally p/w SOB, now POD#0 s/p CABG and MVR, currently in CTICU for management. Per primary team after surgery pt was HR 55, dobutamine was started, HR then increased to 90s. Primary team asking to change backup rate to 60 bpm.    Back up rate was changed to 60 bpm from 55 bpm. Report prior to and after change was printed and placed in pt's chart.    Obi Galvez MD  Cardiology Fellow - PGY 4  For all New Consults and Questions:  [email or web address]   Login: [employer]
Nutrition Follow Up Note  Patient seen for: Nutrition follow-up and CTU Initial Nutrition Assessment    Chart reviewed, events noted. Per chart: 76M, PMH of HTN, PPM, CAD, lymphedema, HTN. P/w low back pain, SOB and DUNCAN. Found to have severe MR and CAD. Now s/p MVR/C2L (). weaned off pressors, however remains intubated 2/2 hypoxia.    Source: EMR, Team    -If unable to interview patient: [x] Trach/Vent/BiPAP  [] Disoriented/confused/inappropriate to interview    Diet, NPO (21)    Is current diet order appropriate/adequate? [] Yes  [x]  No: NPO since     Nutrition-Related Events:  - Intubated, sedated (precedex); now off pressors  - Pt received <200kcals via propofol infusions in the last 24hrs; Propofol currently off  - Insulin ordered to optimize BG  - Per initial RD assessment () pt not following heart healthy diet PTA and consuming foods high in Na    GI:  Last BM .   Bowel Regimen? [x] Yes  (miralax)  [] No    Weight in k.8 (), 96.2 ()  - Noted pt currently being diuresed in-house    Dosing weight 230.6 Ibs ()    MEDICATIONS  (STANDING):  DOBUTamine Infusion  furosemide Infusion  insulin regular Infusion  levothyroxine Injectable  pantoprazole  Injectable  polyethylene glycol 3350  sodium chloride 0.9%.  vasopressin Infusion    Pertinent Labs:  @ 00:26: Na 142, BUN 39<H>, Cr 1.35<H>, <H>, K+ 4.0, Phos 4.4, Mg 2.4, Alk Phos 56, ALT/SGPT 11, AST/SGOT 38, HbA1c --    A1C with Estimated Average Glucose Result: 6.0 % (21 @ 22:50)    Finger Sticks:  POCT Blood Glucose.: 130 mg/dL ( @ 13:52)  POCT Blood Glucose.: 129 mg/dL ( @ 13:01)  POCT Blood Glucose.: 145 mg/dL ( @ 12:04)  POCT Blood Glucose.: 129 mg/dL ( @ 10:50)  POCT Blood Glucose.: 126 mg/dL ( @ 09:52)  POCT Blood Glucose.: 131 mg/dL ( @ 08:45)  POCT Blood Glucose.: 137 mg/dL ( @ 07:53)  POCT Blood Glucose.: 142 mg/dL ( @ 06:51)  POCT Blood Glucose.: 138 mg/dL ( @ 05:59)  POCT Blood Glucose.: 128 mg/dL ( @ 04:54)  POCT Blood Glucose.: 128 mg/dL ( @ 03:54)  POCT Blood Glucose.: 131 mg/dL ( @ 02:56)  POCT Blood Glucose.: 129 mg/dL ( @ 01:57)  POCT Blood Glucose.: 154 mg/dL ( @ 01:04)  POCT Blood Glucose.: 156 mg/dL ( @ 23:58)  POCT Blood Glucose.: 159 mg/dL ( @ 22:56)  POCT Blood Glucose.: 128 mg/dL ( @ 21:55)  POCT Blood Glucose.: 107 mg/dL ( @ 21:30)  POCT Blood Glucose.: 99 mg/dL ( @ 21:02)  POCT Blood Glucose.: 106 mg/dL ( @ 19:57)  POCT Blood Glucose.: 123 mg/dL ( @ 18:56)  POCT Blood Glucose.: 146 mg/dL ( @ 17:52)  POCT Blood Glucose.: 157 mg/dL ( @ 16:55)  POCT Blood Glucose.: 179 mg/dL ( @ 16:04)  POCT Blood Glucose.: 173 mg/dL ( @ 15:11)        Skin per nursing documentation: midsternal surgical incision   Edema: 2+ generalized; 3+ scrotum    Estimated Nutritional Needs  Based on IBW 148Ibs/67.1kg  Energy Needs (30-35 kcals/kg):   Protein Needs (1.3-1.6 g/kg):       Previous Nutrition Diagnosis: Food & Nutrition Related Knowledge Deficit  Nutrition Diagnosis is: [x] ongoing      New Nutrition Diagnosis: Increased Nutrient Needs  Related to increased physiological demand in setting of postoperative healing as evidenced by POD2 MVR/CABG x2 via sternotomy    Nutrition Care Plan:  [x] In Progress  [] Achieved  [] Not applicable      Recommendations:      1. Pending diet advancement s/p extubation would recommend Consistent CHO, Low sodium diet  - Pending need for EN would recommend initiating Glucerna 1.2 @20ml/hr and increase as medically feasible/tolerated to goal rate of 70ml/hr x 24hrs. At goal to provide 1680ml formula, 2016kcals, 101g protein, 1352ml free H2O (meets 30kcals/kg & 1.5g protein/kg based on IBW 67.1kg).  2. Consider addition of Multivitamin supplementation pending no existing contraindications   3. RD to remain available to adjust EN formulary, volume/rate PRN.     Monitoring and Evaluation:   Continue to monitor nutritional intake, tolerance to diet prescription, weights, labs, skin integrity  RD remains available upon request and will follow up per protocol    Shalini Chacko, MS, RD, CDN, CNSC  pager #[phone]
Patients chart and medical record reviewed, patient noted to have postoperative (post cardiopulmonary bypass) vasoplegic shock and a history of chronic diastolic heart failure.  Inotropic support was required postoperative for postoperative acute right ventricular dysfunction secondary to cardiogenic shock.
RD CHF education chart note    Patient was visited by RD for CHF education. Heart failure education provided to the patient in detail. Discussed heart failure nutrition therapy, sodium and fluid intake, importance of diet adherence, daily weights monitoring with the patient. Reinforced importance of weight gain parameters and importance of contacting MD’s about weight changes. Provided handouts on heart failure nutrition therapy, reading heart healthy nutrition labels, heart healthy shopping tips and low sodium food list. Patient verbalized understanding demonstrated by teach back method. RD contact information left with patient for any future questioning.    Melissa Ricardo MS, RD, CDN Pager #[phone]
Reported short episodes of AIVR per Tele    Vital Signs Last 24 Hrs  T(C): 36.6 (21 Aug 2021 16:06), Max: 36.8 (21 Aug 2021 00:52)  HR: 64 (21 Aug 2021 16:06) (64 - 81)  BP: 100/61 (21 Aug 2021 16:06) (100/61 - 143/63)  RR: 18 (21 Aug 2021 16:06) (18 - 18)  SpO2: 95% (21 Aug 2021 16:06) (93% - 95%)    08-21    139  |  103  |  26<H>  ----------------------------<  97  4.1   |  24  |  1.24    Ca    9.2      21 Aug 2021 07:24    EKG - Sinus Rhythm  Monitor    [id number]

## 2021-09-08 NOTE — CHART NOTE - NSCHARTNOTESELECT_GEN_ALL_CORE
Event Note
Hospital Course/Event Note
Nutrition Services
Nutrition Services
PPM Parameter Change/Event Note

## 2021-09-08 NOTE — PROGRESS NOTE ADULT - ASSESSMENT
77 yo  h/o PPM , CAD.  LYMPHEDEMA. HTN, MR,  a.fib on no ac secondary to frequent falls, PVD on pletal presents with SOB. Cardiac workup demonstrates severe MR and multivessel disease.    On 21, pt underwent MVR-t/CABG x 2 w/ LIMA  intraop & post-op bleeding w/ multiple products given ? TRALI-hypoxic- lasix gtt-     EP increased back up rate to 60 - .    gtt - rapid afib -  d/c'd, bb   lasix gtt d/c'd - EXTUBATED.    SDU - BB Increased.  -battery end-of-life - will change battery on  if INR < 1.8 per DR. Christensen   VSS afib 90s  - low dose coumadin - NPO for generator change PPM w/ DR. Christensen.  ck inr in am   d/c plan    Generator change today.  Change to toprol xl 100  Ambulation  Anticoagulation    s/p ppm generator change yesterday, dressing cdi.  Pt with LLL consolidation, collapse vs effusion, ct non con today  d/c pw

## 2021-09-08 NOTE — PROGRESS NOTE ADULT - SUBJECTIVE AND OBJECTIVE BOX
VITAL SIGNS    Telemetry:  afib/ vpaced 77    Vital Signs Last 24 Hrs  T(C): 36.8 (21 @ 06:53), Max: 37.1 (21 @ 15:00)  T(F): 98.3 (21 @ 06:53), Max: 98.7 (21 @ 15:00)  HR: 92 (21 @ 06:53) (84 - 99)  BP: 120/60 (21 @ 06:53) (97/54 - 120/60)  RR: 18 (21 @ 06:53) (18 - 20)  SpO2: 96% (21 @ 06:53) (90% - 97%)                    @ 07:01  -   @ 07:00  --------------------------------------------------------  IN: 200 mL / OUT: 1025 mL / NET: -825 mL          Daily     Daily Weight in k.6 (08 Sep 2021 06:00)            CAPILLARY BLOOD GLUCOSE                  Pacing Wires        [  ]   Settings:                                  Isolated  [  x]    Coumadin    [ ] YES          [  ]      NO                                   PHYSICAL EXAM        Neurology: alert and oriented x 3, nonfocal, no gross deficits  CV : s1 s2 RRR  Sternal Wound :  CDI , Stable  R chest dressing cdi  Lungs: cta  Abdomen: soft, nontender, nondistended, positive bowel sounds, last bowel movement +                      :    voiding    Extremities:    +  edema   /  -   calve tenderness           acetaminophen   Tablet .. 650 milliGRAM(s) Oral every 6 hours PRN  ascorbic acid 500 milliGRAM(s) Oral two times a day  aspirin  chewable 81 milliGRAM(s) Oral daily  atorvastatin 40 milliGRAM(s) Oral at bedtime  chlorhexidine 2% Cloths 1 Application(s) Topical <User Schedule>  cilostazol 100 milliGRAM(s) Oral every 12 hours  clotrimazole 1% Cream 1 Application(s) Topical two times a day  enoxaparin Injectable 40 milliGRAM(s) SubCutaneous daily  escitalopram 5 milliGRAM(s) Oral daily  ferrous    sulfate 325 milliGRAM(s) Oral daily  folic acid 1 milliGRAM(s) Oral daily  hydrocortisone 1% Cream 1 Application(s) Topical daily  levothyroxine 137 MICROGram(s) Oral daily  metoprolol succinate  milliGRAM(s) Oral daily  pantoprazole    Tablet 40 milliGRAM(s) Oral before breakfast  polyethylene glycol 3350 17 Gram(s) Oral daily  senna 2 Tablet(s) Oral at bedtime                    Physical Therapy Rec:   Home  [  ]   Home w/ PT  [  ]  Rehab  [  ]  Discussed with Cardiothoracic Team at AM rounds.

## 2021-09-08 NOTE — PROGRESS NOTE ADULT - ASSESSMENT
anemia  afib  cad    s/p CABG and MVR 8/30  no overt gi bleed  denies melena/hematochezia  hgb stable  had egd/colon 12/2020 (cher metha)  cbc daily  transfuse PRN  s/p PPM generator change   cardio following   will follow   dw patient     Advanced care planning was discussed with patient and family.  Advanced care planning forms were reviewed and discussed.  Risks, benefits and alternatives of gastroenterologic procedures were discussed in detail and all questions were answered.    30 minutes spent.

## 2021-09-08 NOTE — PROGRESS NOTE ADULT - SUBJECTIVE AND OBJECTIVE BOX
CARDIOLOGY     PROGRESS  NOTE   ________________________________________________    CHIEF COMPLAINT:Patient is a 76y old  Male who presents with a chief complaint of sob (07 Sep 2021 11:23)  no complan  	  REVIEW OF SYSTEMS:  CONSTITUTIONAL: No fever, weight loss, or fatigue  EYES: No eye pain, visual disturbances, or discharge  ENT:  No difficulty hearing, tinnitus, vertigo; No sinus or throat pain  NECK: No pain or stiffness  RESPIRATORY: No cough, wheezing, chills or hemoptysis; No Shortness of Breath  CARDIOVASCULAR: No chest pain, palpitations, passing out, dizziness, or leg swelling  GASTROINTESTINAL: No abdominal or epigastric pain. No nausea, vomiting, or hematemesis; No diarrhea or constipation. No melena or hematochezia.  GENITOURINARY: No dysuria, frequency, hematuria, or incontinence  NEUROLOGICAL: No headaches, memory loss, loss of strength, numbness, or tremors  SKIN: No itching, burning, rashes, or lesions   LYMPH Nodes: No enlarged glands  ENDOCRINE: No heat or cold intolerance; No hair loss  MUSCULOSKELETAL: No joint pain or swelling; No muscle, back, or extremity pain  PSYCHIATRIC: No depression, anxiety, mood swings, or difficulty sleeping  HEME/LYMPH: No easy bruising, or bleeding gums  ALLERGY AND IMMUNOLOGIC: No hives or eczema	    [ ] All others negative	  [ ] Unable to obtain    PHYSICAL EXAM:  T(C): 36.8 (09-08-21 @ 06:53), Max: 37.1 (09-07-21 @ 15:00)  HR: 92 (09-08-21 @ 06:53) (84 - 99)  BP: 120/60 (09-08-21 @ 06:53) (97/54 - 120/60)  RR: 18 (09-08-21 @ 06:53) (18 - 20)  SpO2: 96% (09-08-21 @ 06:53) (90% - 97%)  Wt(kg): --  I&O's Summary    07 Sep 2021 07:01  -  08 Sep 2021 07:00  --------------------------------------------------------  IN: 200 mL / OUT: 1025 mL / NET: -825 mL        Appearance: Normal	  HEENT:   Normal oral mucosa, PERRL, EOMI	  Lymphatic: No lymphadenopathy  Cardiovascular: Normal S1 S2, No JVD, + murmurs, No edema  Respiratory: Lungs clear to auscultation	  Psychiatry: A & O x 3, Mood & affect appropriate  Gastrointestinal:  Soft, Non-tender, + BS	  Skin: No rashes, No ecchymoses, No cyanosis	  Neurologic: Non-focal  Extremities: Normal range of motion, No clubbing, cyanosis or edema  Vascular: Peripheral pulses palpable 2+ bilaterally    MEDICATIONS  (STANDING):  ascorbic acid 500 milliGRAM(s) Oral two times a day  aspirin  chewable 81 milliGRAM(s) Oral daily  atorvastatin 40 milliGRAM(s) Oral at bedtime  chlorhexidine 2% Cloths 1 Application(s) Topical <User Schedule>  cilostazol 100 milliGRAM(s) Oral every 12 hours  clotrimazole 1% Cream 1 Application(s) Topical two times a day  enoxaparin Injectable 40 milliGRAM(s) SubCutaneous daily  escitalopram 5 milliGRAM(s) Oral daily  ferrous    sulfate 325 milliGRAM(s) Oral daily  folic acid 1 milliGRAM(s) Oral daily  hydrocortisone 1% Cream 1 Application(s) Topical daily  levothyroxine 137 MICROGram(s) Oral daily  metoprolol succinate  milliGRAM(s) Oral daily  pantoprazole    Tablet 40 milliGRAM(s) Oral before breakfast  polyethylene glycol 3350 17 Gram(s) Oral daily  senna 2 Tablet(s) Oral at bedtime      TELEMETRY: 	    ECG:  	  RADIOLOGY:  OTHER: 	  	  LABS:	 	    CARDIAC MARKERS:                                8.2    6.94  )-----------( 160      ( 08 Sep 2021 05:41 )             26.4     09-08    137  |  102  |  23  ----------------------------<  115<H>  4.3   |  24  |  0.90    Ca    9.1      08 Sep 2021 05:41  Phos  2.6     09-07  Mg     1.8     09-07    TPro  5.2<L>  /  Alb  3.0<L>  /  TBili  0.6  /  DBili  x   /  AST  12  /  ALT  10  /  AlkPhos  80  09-07    proBNP: Serum Pro-Brain Natriuretic Peptide: 1881 pg/mL (08-18 @ 16:10)    Lipid Profile:   HgA1c:   TSH: Thyroid Stimulating Hormone, Serum: 5.02 uIU/mL (08-25 @ 22:25)    PT/INR - ( 08 Sep 2021 05:41 )   PT: 16.1 sec;   INR: 1.36 ratio               Assessment and plan  ---------------------------  s/p CABG 2 vessels and MVR/ bio  post op bleeding, s/p blood transfusion  extubated  hgb stable  ppm noted hr has decreased to hr  to 600 jose 120 AAAIR/ DDDR  s/p extubation  ?AC post MVR as per ct surgery  need to change PPM  battery prior to Discharge  oob to  chair  physical therapy  PPM almost EOL, plan to change the ppm battery prior to dc ? Tuesday as long as inr less than 1.8  pt with hx of PAF, now in a.fib ?amio load will consider possible cardioversion latter on  a.fib hr is well controlled  doing well, post ppm battery change  ac with coumadin, fu inr

## 2021-09-09 LAB
ANION GAP SERPL CALC-SCNC: 12 MMOL/L — SIGNIFICANT CHANGE UP (ref 5–17)
APTT BLD: 27.7 SEC — SIGNIFICANT CHANGE UP (ref 27.5–35.5)
BUN SERPL-MCNC: 22 MG/DL — SIGNIFICANT CHANGE UP (ref 7–23)
CALCIUM SERPL-MCNC: 8.9 MG/DL — SIGNIFICANT CHANGE UP (ref 8.4–10.5)
CHLORIDE SERPL-SCNC: 101 MMOL/L — SIGNIFICANT CHANGE UP (ref 96–108)
CO2 SERPL-SCNC: 21 MMOL/L — LOW (ref 22–31)
CREAT SERPL-MCNC: 0.92 MG/DL — SIGNIFICANT CHANGE UP (ref 0.5–1.3)
GLUCOSE SERPL-MCNC: 108 MG/DL — HIGH (ref 70–99)
HCT VFR BLD CALC: 24.6 % — LOW (ref 39–50)
HGB BLD-MCNC: 7.9 G/DL — LOW (ref 13–17)
INR BLD: 1.53 RATIO — HIGH (ref 0.88–1.16)
MCHC RBC-ENTMCNC: 29.7 PG — SIGNIFICANT CHANGE UP (ref 27–34)
MCHC RBC-ENTMCNC: 32.1 GM/DL — SIGNIFICANT CHANGE UP (ref 32–36)
MCV RBC AUTO: 92.5 FL — SIGNIFICANT CHANGE UP (ref 80–100)
NRBC # BLD: 0 /100 WBCS — SIGNIFICANT CHANGE UP (ref 0–0)
PLATELET # BLD AUTO: 166 K/UL — SIGNIFICANT CHANGE UP (ref 150–400)
POTASSIUM SERPL-MCNC: 3.6 MMOL/L — SIGNIFICANT CHANGE UP (ref 3.5–5.3)
POTASSIUM SERPL-SCNC: 3.6 MMOL/L — SIGNIFICANT CHANGE UP (ref 3.5–5.3)
PROTHROM AB SERPL-ACNC: 18 SEC — HIGH (ref 10.6–13.6)
RBC # BLD: 2.66 M/UL — LOW (ref 4.2–5.8)
RBC # FLD: 14.7 % — HIGH (ref 10.3–14.5)
SODIUM SERPL-SCNC: 134 MMOL/L — LOW (ref 135–145)
WBC # BLD: 6.65 K/UL — SIGNIFICANT CHANGE UP (ref 3.8–10.5)
WBC # FLD AUTO: 6.65 K/UL — SIGNIFICANT CHANGE UP (ref 3.8–10.5)

## 2021-09-09 PROCEDURE — 71045 X-RAY EXAM CHEST 1 VIEW: CPT | Mod: 26

## 2021-09-09 RX ORDER — WARFARIN SODIUM 2.5 MG/1
5 TABLET ORAL ONCE
Refills: 0 | Status: COMPLETED | OUTPATIENT
Start: 2021-09-09 | End: 2021-09-09

## 2021-09-09 RX ORDER — POTASSIUM BICARBONATE 978 MG/1
25 TABLET, EFFERVESCENT ORAL
Refills: 0 | Status: COMPLETED | OUTPATIENT
Start: 2021-09-09 | End: 2021-09-09

## 2021-09-09 RX ADMIN — Medication 1 APPLICATION(S): at 11:42

## 2021-09-09 RX ADMIN — Medication 500 MILLIGRAM(S): at 05:56

## 2021-09-09 RX ADMIN — HEPARIN SODIUM 5000 UNIT(S): 5000 INJECTION INTRAVENOUS; SUBCUTANEOUS at 05:57

## 2021-09-09 RX ADMIN — PANTOPRAZOLE SODIUM 40 MILLIGRAM(S): 20 TABLET, DELAYED RELEASE ORAL at 06:11

## 2021-09-09 RX ADMIN — SENNA PLUS 2 TABLET(S): 8.6 TABLET ORAL at 21:43

## 2021-09-09 RX ADMIN — POTASSIUM BICARBONATE 25 MILLIEQUIVALENT(S): 978 TABLET, EFFERVESCENT ORAL at 07:47

## 2021-09-09 RX ADMIN — HEPARIN SODIUM 5000 UNIT(S): 5000 INJECTION INTRAVENOUS; SUBCUTANEOUS at 21:43

## 2021-09-09 RX ADMIN — Medication 137 MICROGRAM(S): at 05:56

## 2021-09-09 RX ADMIN — Medication 40 MILLIGRAM(S): at 05:56

## 2021-09-09 RX ADMIN — ESCITALOPRAM OXALATE 5 MILLIGRAM(S): 10 TABLET, FILM COATED ORAL at 11:42

## 2021-09-09 RX ADMIN — CILOSTAZOL 100 MILLIGRAM(S): 100 TABLET ORAL at 17:06

## 2021-09-09 RX ADMIN — Medication 20 MILLIEQUIVALENT(S): at 11:42

## 2021-09-09 RX ADMIN — Medication 1 APPLICATION(S): at 17:07

## 2021-09-09 RX ADMIN — ATORVASTATIN CALCIUM 40 MILLIGRAM(S): 80 TABLET, FILM COATED ORAL at 21:43

## 2021-09-09 RX ADMIN — CHLORHEXIDINE GLUCONATE 1 APPLICATION(S): 213 SOLUTION TOPICAL at 10:38

## 2021-09-09 RX ADMIN — WARFARIN SODIUM 5 MILLIGRAM(S): 2.5 TABLET ORAL at 21:42

## 2021-09-09 RX ADMIN — Medication 325 MILLIGRAM(S): at 11:41

## 2021-09-09 RX ADMIN — Medication 81 MILLIGRAM(S): at 11:41

## 2021-09-09 RX ADMIN — Medication 500 MILLIGRAM(S): at 17:06

## 2021-09-09 RX ADMIN — HEPARIN SODIUM 5000 UNIT(S): 5000 INJECTION INTRAVENOUS; SUBCUTANEOUS at 13:00

## 2021-09-09 RX ADMIN — CILOSTAZOL 100 MILLIGRAM(S): 100 TABLET ORAL at 05:57

## 2021-09-09 RX ADMIN — Medication 100 MILLIGRAM(S): at 05:56

## 2021-09-09 RX ADMIN — Medication 1 MILLIGRAM(S): at 11:42

## 2021-09-09 RX ADMIN — Medication 1 APPLICATION(S): at 06:10

## 2021-09-09 RX ADMIN — POTASSIUM BICARBONATE 25 MILLIEQUIVALENT(S): 978 TABLET, EFFERVESCENT ORAL at 10:38

## 2021-09-09 NOTE — PROGRESS NOTE ADULT - SUBJECTIVE AND OBJECTIVE BOX
CARDIOLOGY     PROGRESS  NOTE   ________________________________________________    CHIEF COMPLAINT:Patient is a 76y old  Male who presents with a chief complaint of sob (08 Sep 2021 10:06)  doing better, decrease sob  	  REVIEW OF SYSTEMS:  CONSTITUTIONAL: No fever, weight loss, or fatigue  EYES: No eye pain, visual disturbances, or discharge  ENT:  No difficulty hearing, tinnitus, vertigo; No sinus or throat pain  NECK: No pain or stiffness  RESPIRATORY: No cough, wheezing, chills or hemoptysis; + decrease  Shortness of Breath  CARDIOVASCULAR: No chest pain, palpitations, passing out, dizziness, or leg swelling  GASTROINTESTINAL: No abdominal or epigastric pain. No nausea, vomiting, or hematemesis; No diarrhea or constipation. No melena or hematochezia.  GENITOURINARY: No dysuria, frequency, hematuria, or incontinence  NEUROLOGICAL: No headaches, memory loss, loss of strength, numbness, or tremors  SKIN: No itching, burning, rashes, or lesions   LYMPH Nodes: No enlarged glands  ENDOCRINE: No heat or cold intolerance; No hair loss  MUSCULOSKELETAL: No joint pain or swelling; No muscle, back, or extremity pain  PSYCHIATRIC: No depression, anxiety, mood swings, or difficulty sleeping  HEME/LYMPH: No easy bruising, or bleeding gums  ALLERGY AND IMMUNOLOGIC: No hives or eczema	    [ ] All others negative	  [ ] Unable to obtain    PHYSICAL EXAM:  T(C): 36.8 (09-09-21 @ 07:28), Max: 36.9 (09-09-21 @ 03:15)  HR: 88 (09-09-21 @ 07:28) (82 - 91)  BP: 106/56 (09-09-21 @ 07:28) (104/74 - 116/66)  RR: 18 (09-09-21 @ 07:28) (17 - 18)  SpO2: 97% (09-09-21 @ 07:28) (94% - 97%)  Wt(kg): --  I&O's Summary    08 Sep 2021 07:01  -  09 Sep 2021 07:00  --------------------------------------------------------  IN: 460 mL / OUT: 1750 mL / NET: -1290 mL        Appearance: Normal	  HEENT:   Normal oral mucosa, PERRL, EOMI	  Lymphatic: No lymphadenopathy  Cardiovascular: Normal S1 S2, No JVD, + murmurs, + edema  Respiratory: Lungs clear to auscultation	  Psychiatry: A & O x 3, Mood & affect appropriate  Gastrointestinal:  Soft, Non-tender, + BS	  Skin: No rashes, No ecchymoses, No cyanosis	  Neurologic: Non-focal  Extremities: Normal range of motion, No clubbing, cyanosis . + edema  Vascular: Peripheral pulses palpable 2+ bilaterally    MEDICATIONS  (STANDING):  ascorbic acid 500 milliGRAM(s) Oral two times a day  aspirin  chewable 81 milliGRAM(s) Oral daily  atorvastatin 40 milliGRAM(s) Oral at bedtime  chlorhexidine 2% Cloths 1 Application(s) Topical <User Schedule>  cilostazol 100 milliGRAM(s) Oral every 12 hours  clotrimazole 1% Cream 1 Application(s) Topical two times a day  escitalopram 5 milliGRAM(s) Oral daily  ferrous    sulfate 325 milliGRAM(s) Oral daily  folic acid 1 milliGRAM(s) Oral daily  furosemide    Tablet 40 milliGRAM(s) Oral daily  heparin   Injectable 5000 Unit(s) SubCutaneous every 8 hours  hydrocortisone 1% Cream 1 Application(s) Topical daily  levothyroxine 137 MICROGram(s) Oral daily  metoprolol succinate  milliGRAM(s) Oral daily  pantoprazole    Tablet 40 milliGRAM(s) Oral before breakfast  polyethylene glycol 3350 17 Gram(s) Oral daily  potassium bicarbonate/potassium citrate 25 milliEquivalent(s) Oral every 1 hour  potassium chloride    Tablet ER 20 milliEquivalent(s) Oral daily  senna 2 Tablet(s) Oral at bedtime      TELEMETRY: 	    ECG:  	  RADIOLOGY:  OTHER: 	  	  LABS:	 	    CARDIAC MARKERS:                                7.9    6.65  )-----------( 166      ( 09 Sep 2021 05:43 )             24.6     09-09    134<L>  |  101  |  22  ----------------------------<  108<H>  3.6   |  21<L>  |  0.92    Ca    8.9      09 Sep 2021 05:43      proBNP: Serum Pro-Brain Natriuretic Peptide: 1881 pg/mL (08-18 @ 16:10)    Lipid Profile:   HgA1c:   TSH: Thyroid Stimulating Hormone, Serum: 5.02 uIU/mL (08-25 @ 22:25)    PT/INR - ( 09 Sep 2021 05:43 )   PT: 18.0 sec;   INR: 1.53 ratio         PTT - ( 09 Sep 2021 05:43 )  PTT:27.7 sec  < from: CT Chest No Cont (09.08.21 @ 08:32) >  Left pleural effusion with near complete left lower lobe atelectasis.    6.7 x 2.8 cm retrosternal hematoma. No comment can be made about active extravasation on this noncontrast exam.    These findings were discussed with NP FRANCISCO MCGEE on 9/8/2021 at 2:18 PM by Dr. Morfin with read back confirmation.    CT scan reviewed with  Dr Daniel  , no further intervention at this time.  Continue coumadin, can eventually transition to eliquis as outpatient in 2-3 mos.    Assessment and plan  ---------------------------  s/p CABG 2 vessels and MVR/ bio  post op bleeding, s/p blood transfusion  extubated  hgb stable  ppm noted hr has decreased to hr  to 600 jose 120 AAAIR/ DDDR  s/p extubation  ?AC post MVR as per ct surgery  need to change PPM  battery prior to Discharge  oob to  chair  physical therapy  PPM almost EOL, plan to change the ppm battery prior to dc ? Tuesday as long as inr less than 1.8  pt with hx of PAF, now in a.fib ?amio load will consider possible cardioversion latter on  a.fib hr is well controlled  doing well, post ppm battery change  ac with coumadin, fu inr  continue diuretics  ct was reviewed by ct surgery no intervention for retrosternal hematoma  fu hgb

## 2021-09-09 NOTE — PROGRESS NOTE ADULT - ASSESSMENT
76  year old male          h/o   , CAD s/p stent on ASA, A-fib/ PPM, , hypothyroid, and total left knee replacement       s/p rash across the upper torso/ back  for past week, pruritic on R arm, and rash in the groin      prior  PPM  interrogation  with  [facility]/   s/p  brief   bursts  of  afib/ , on prior visit         * admitted with sob,  from acute  diastolic  chf, related  to non compliance  with his  lasix   wound care/  lymphedema,  carla left  leg     *  CAD/ AFIb  /PPM,/ HTN   , stent in 2007   *  h/o  AFIB,  was  not  on  a/c    * Anemia, , hb stable, had  been seen by  gi  eval dr joann sanchez in past    right leg with distal redness.  not cellulitis / c/c  for past few  months  an d left leg lymphedema     xray spine. horse  shoe left kidney/ OA  of  spine  on    lasix,  ha  improved    * Echo, normal  ef/ severe MR and,  ERENDIRA ,  severe  MR    cath, with 2 vessel disease,  s/p  CABG and  MVR  surg d r marni   h/o  Afib on  coumadin  on asa.  lipitor, torpol,  synthroid. lasix  hb is 7/  24. Ct chest, retrosternal hematoma.  mod left pl effusion   CT surg note  seen,  per   surgeon   ok to  continue  coumadin/  for  afib   follow hb         f/p wih  dr mikayla dick   ra from: CT Chest No Cont (09.08.21 @ 08:32) >  IMPRESSION:  Left pleural effusion with near complete left lower lobe atelectasis.  6.7 x 2.8 cm retrosternal hematoma. No comment can be made about active extravasation on this noncontrast exam.  These findings were discussed with NP FRANCISCO MCGEE on 9/8/2021 at 2:18 PM by Dr. Morfin with read back confirmation.  --- End of Report ---  < end of copied text >

## 2021-09-09 NOTE — PROGRESS NOTE ADULT - SUBJECTIVE AND OBJECTIVE BOX
INTERVAL HPI/OVERNIGHT EVENTS:  s/p CABG/MVR,   events noted, hgb stable        MEDICATIONS  (PRN):      Allergies    alfuzosin (Hives)  levofloxacin (Hives)  Myrbetriq (Hives)  tamsulosin (Hives)    Intolerances      ROS   unable to obtain         PHYSICAL EXAM:   Vital Signs:  Vital Signs Last 24 Hrs  T(C): 37.1 (29 Aug 2021 04:21), Max: 37.1 (29 Aug 2021 04:21)  T(F): 98.7 (29 Aug 2021 04:21), Max: 98.7 (29 Aug 2021 04:21)  HR: 55 (29 Aug 2021 04:21) (55 - 78)  BP: 116/61 (29 Aug 2021 04:21) (116/61 - 146/63)  BP(mean): --  RR: 18 (29 Aug 2021 04:21) (18 - 18)  SpO2: 93% (29 Aug 2021 04:21) (93% - 96%)  Daily     Daily Weight in k.2 (29 Aug 2021 07:30)I&O's Summary    28 Aug 2021 07:  -  29 Aug 2021 07:00  --------------------------------------------------------  IN: 1110 mL / OUT: 5 mL / NET: -915 mL    29 Aug 2021 07:  -  29 Aug 2021 10:27  --------------------------------------------------------  IN: 240 mL / OUT: 800 mL / NET: -560 mL        GENERAL:  Appears stated age, well-groomed, well-nourished, no distress  HEENT:  NC/AT,  conjunctivae clear and pink, no thyromegaly, nodules, adenopathy, no JVD, sclera -anicteric  CHEST:  Full & symmetric excursion, no increased effort, breath sounds clear  HEART:  Regular rhythm, S1, S2, no murmur/rub/S3/S4, no abdominal bruit, no edema  ABDOMEN:  Soft, non-tender, non-distended, normoactive bowel sounds,  no masses ,no hepato-splenomegaly, no signs of chronic liver disease  EXTEREMITIES:  no cyanosis,clubbing or edema  SKIN:  No rash/erythema/ecchymoses/petechiae/wounds/abscess/warm/dry  NEURO:  Alert, oriented, no asterixis, no tremor, no encephalopathy      LABS:                        11.5   5.65  )-----------( 138      ( 29 Aug 2021 06:30 )             36.1         139  |  105  |  44<H>  ----------------------------<  113<H>  4.5   |  21<L>  |  1.06    Ca    9.8      29 Aug 2021 06:30          amylase   lipase  RADIOLOGY & ADDITIONAL TESTS:

## 2021-09-09 NOTE — PROGRESS NOTE ADULT - ASSESSMENT
77 yo  h/o PPM , CAD.  LYMPHEDEMA. HTN, MR,  a.fib on no ac secondary to frequent falls, PVD on pletal presents with SOB. Cardiac workup demonstrates severe MR and multivessel disease.    On 21, pt underwent MVR-t/CABG x 2 w/ LIMA  intraop & post-op bleeding w/ multiple products given ? TRALI-hypoxic- lasix gtt-     EP increased back up rate to 60 - .    gtt - rapid afib -  d/c'd, bb   lasix gtt d/c'd - EXTUBATED.    SDU - BB Increased.  -battery end-of-life - will change battery on  if INR < 1.8 per DR. Christensen   VSS afib 90s  - low dose coumadin - NPO for generator change PPM w/ DR. Christensen.  ck inr in am   d/c plan    Generator change today.  Change to toprol xl 100  Ambulation  Anticoagulation    s/p ppm generator change yesterday, dressing cdi.  Pt with LLL consolidation, collapse vs effusion, ct non con today  d/c pw   CT stble, reviewed with Dr grider, no further intervention.  Started on diuretics  Transfer to floor. m Increase PT  pw out

## 2021-09-09 NOTE — PROGRESS NOTE ADULT - SUBJECTIVE AND OBJECTIVE BOX
afberile    REVIEW OF SYSTEMS:  GEN: no fever,    no chills  RESP: no SOB,   no cough  CVS: no chest pain,   no palpitations  GI: no abdominal pain,   no nausea,   no vomiting,   no constipation,   no diarrhea  : no dysuria,   no frequency  NEURO: no headache,   no dizziness  PSYCH: no depression,   not anxious  Derm : no rash    MEDICATIONS  (STANDING):  ascorbic acid 500 milliGRAM(s) Oral two times a day  aspirin  chewable 81 milliGRAM(s) Oral daily  atorvastatin 40 milliGRAM(s) Oral at bedtime  chlorhexidine 2% Cloths 1 Application(s) Topical <User Schedule>  cilostazol 100 milliGRAM(s) Oral every 12 hours  clotrimazole 1% Cream 1 Application(s) Topical two times a day  escitalopram 5 milliGRAM(s) Oral daily  ferrous    sulfate 325 milliGRAM(s) Oral daily  folic acid 1 milliGRAM(s) Oral daily  furosemide    Tablet 40 milliGRAM(s) Oral daily  heparin   Injectable 5000 Unit(s) SubCutaneous every 8 hours  hydrocortisone 1% Cream 1 Application(s) Topical daily  levothyroxine 137 MICROGram(s) Oral daily  metoprolol succinate  milliGRAM(s) Oral daily  pantoprazole    Tablet 40 milliGRAM(s) Oral before breakfast  polyethylene glycol 3350 17 Gram(s) Oral daily  potassium bicarbonate/potassium citrate 25 milliEquivalent(s) Oral every 1 hour  potassium chloride    Tablet ER 20 milliEquivalent(s) Oral daily  senna 2 Tablet(s) Oral at bedtime    MEDICATIONS  (PRN):  acetaminophen   Tablet .. 650 milliGRAM(s) Oral every 6 hours PRN Mild Pain (1 - 3)      Vital Signs Last 24 Hrs  T(C): 36.8 (09 Sep 2021 07:28), Max: 36.9 (09 Sep 2021 03:15)  T(F): 98.3 (09 Sep 2021 07:28), Max: 98.4 (09 Sep 2021 03:15)  HR: 88 (09 Sep 2021 07:28) (82 - 91)  BP: 106/56 (09 Sep 2021 07:28) (104/74 - 116/66)  BP(mean): 85 (09 Sep 2021 05:00) (78 - 86)  RR: 18 (09 Sep 2021 07:28) (17 - 18)  SpO2: 97% (09 Sep 2021 07:28) (94% - 97%)  CAPILLARY BLOOD GLUCOSE        I&O's Summary    08 Sep 2021 07:01  -  09 Sep 2021 07:00  --------------------------------------------------------  IN: 460 mL / OUT: 1750 mL / NET: -1290 mL        PHYSICAL EXAM:  HEAD:  Atraumatic, Normocephalic  NECK: Supple, No   JVD  CHEST/LUNG:   no     rales,     no,    rhonchi  HEART: Regular rate and rhythm;         murmur  ABDOMEN: Soft, Nontender, ;   EXTREMITIES:   less     edema  NEUROLOGY:  alert    LABS:                        7.9    6.65  )-----------( 166      ( 09 Sep 2021 05:43 )             24.6     09-09    134<L>  |  101  |  22  ----------------------------<  108<H>  3.6   |  21<L>  |  0.92    Ca    8.9      09 Sep 2021 05:43      PT/INR - ( 09 Sep 2021 05:43 )   PT: 18.0 sec;   INR: 1.53 ratio         PTT - ( 09 Sep 2021 05:43 )  PTT:27.7 sec                Thyroid Stimulating Hormone, Serum: 5.02 uIU/mL (08-25 @ 22:25)          Consultant(s) Notes Reviewed:      Care Discussed with Consultants/Other Providers:

## 2021-09-09 NOTE — PROGRESS NOTE ADULT - SUBJECTIVE AND OBJECTIVE BOX
VITAL SIGNS    Telemetry:  afib/v paced    Vital Signs Last 24 Hrs  T(C): 37.1 (21 @ 10:35), Max: 37.1 (21 @ 10:35)  T(F): 98.8 (21 @ 10:35), Max: 98.8 (21 @ 10:35)  HR: 83 (21 @ 10:35) (82 - 91)  BP: 111/65 (21 @ 10:35) (104/74 - 116/66)  RR: 18 (21 @ 10:35) (17 - 18)  SpO2: 94% (21 @ 10:35) (94% - 97%)                    @ 07:01  -   @ 07:00  --------------------------------------------------------  IN: 460 mL / OUT: 1750 mL / NET: -1290 mL     @ 07:01  -   @ 10:51  --------------------------------------------------------  IN: 360 mL / OUT: 600 mL / NET: -240 mL          Daily     Daily Weight in k.2 (09 Sep 2021 06:22)            CAPILLARY BLOOD GLUCOSE                        Coumadin    [ ] YES          [  ]      NO                                   PHYSICAL EXAM        Neurology: alert and oriented x 3, nonfocal, no gross deficits  CV : s1 s2 RRR  Sternal Wound :  CDI , Stable  Lungs: cta  Abdomen: soft, nontender, nondistended, positive bowel sounds, last bowel movement +                   :    voiding     Extremities:    trace   edema   /  -   calve tenderness ,    r svg cdi          acetaminophen   Tablet .. 650 milliGRAM(s) Oral every 6 hours PRN  ascorbic acid 500 milliGRAM(s) Oral two times a day  aspirin  chewable 81 milliGRAM(s) Oral daily  atorvastatin 40 milliGRAM(s) Oral at bedtime  chlorhexidine 2% Cloths 1 Application(s) Topical <User Schedule>  cilostazol 100 milliGRAM(s) Oral every 12 hours  clotrimazole 1% Cream 1 Application(s) Topical two times a day  escitalopram 5 milliGRAM(s) Oral daily  ferrous    sulfate 325 milliGRAM(s) Oral daily  folic acid 1 milliGRAM(s) Oral daily  furosemide    Tablet 40 milliGRAM(s) Oral daily  heparin   Injectable 5000 Unit(s) SubCutaneous every 8 hours  hydrocortisone 1% Cream 1 Application(s) Topical daily  levothyroxine 137 MICROGram(s) Oral daily  metoprolol succinate  milliGRAM(s) Oral daily  pantoprazole    Tablet 40 milliGRAM(s) Oral before breakfast  polyethylene glycol 3350 17 Gram(s) Oral daily  potassium chloride    Tablet ER 20 milliEquivalent(s) Oral daily  senna 2 Tablet(s) Oral at bedtime                    Physical Therapy Rec:   Home  [  ]   Home w/ PT  [  ]  Rehab  [  ]  Discussed with Cardiothoracic Team at AM rounds.

## 2021-09-10 LAB
ALBUMIN SERPL ELPH-MCNC: 3.1 G/DL — LOW (ref 3.3–5)
ALP SERPL-CCNC: 87 U/L — SIGNIFICANT CHANGE UP (ref 40–120)
ALT FLD-CCNC: 9 U/L — LOW (ref 10–45)
ANION GAP SERPL CALC-SCNC: 12 MMOL/L — SIGNIFICANT CHANGE UP (ref 5–17)
APTT BLD: 33.1 SEC — SIGNIFICANT CHANGE UP (ref 27.5–35.5)
AST SERPL-CCNC: 10 U/L — SIGNIFICANT CHANGE UP (ref 10–40)
BILIRUB SERPL-MCNC: 0.7 MG/DL — SIGNIFICANT CHANGE UP (ref 0.2–1.2)
BUN SERPL-MCNC: 25 MG/DL — HIGH (ref 7–23)
CALCIUM SERPL-MCNC: 8.9 MG/DL — SIGNIFICANT CHANGE UP (ref 8.4–10.5)
CHLORIDE SERPL-SCNC: 101 MMOL/L — SIGNIFICANT CHANGE UP (ref 96–108)
CO2 SERPL-SCNC: 24 MMOL/L — SIGNIFICANT CHANGE UP (ref 22–31)
CREAT SERPL-MCNC: 1.03 MG/DL — SIGNIFICANT CHANGE UP (ref 0.5–1.3)
GLUCOSE SERPL-MCNC: 109 MG/DL — HIGH (ref 70–99)
HCT VFR BLD CALC: 27.4 % — LOW (ref 39–50)
HGB BLD-MCNC: 8.6 G/DL — LOW (ref 13–17)
INR BLD: 1.75 RATIO — HIGH (ref 0.88–1.16)
MAGNESIUM SERPL-MCNC: 1.7 MG/DL — SIGNIFICANT CHANGE UP (ref 1.6–2.6)
MCHC RBC-ENTMCNC: 29.4 PG — SIGNIFICANT CHANGE UP (ref 27–34)
MCHC RBC-ENTMCNC: 31.4 GM/DL — LOW (ref 32–36)
MCV RBC AUTO: 93.5 FL — SIGNIFICANT CHANGE UP (ref 80–100)
NRBC # BLD: 0 /100 WBCS — SIGNIFICANT CHANGE UP (ref 0–0)
PHOSPHATE SERPL-MCNC: 2.8 MG/DL — SIGNIFICANT CHANGE UP (ref 2.5–4.5)
PLATELET # BLD AUTO: 188 K/UL — SIGNIFICANT CHANGE UP (ref 150–400)
POTASSIUM SERPL-MCNC: 3.7 MMOL/L — SIGNIFICANT CHANGE UP (ref 3.5–5.3)
POTASSIUM SERPL-SCNC: 3.7 MMOL/L — SIGNIFICANT CHANGE UP (ref 3.5–5.3)
PROT SERPL-MCNC: 5.6 G/DL — LOW (ref 6–8.3)
PROTHROM AB SERPL-ACNC: 20.4 SEC — HIGH (ref 10.6–13.6)
RBC # BLD: 2.93 M/UL — LOW (ref 4.2–5.8)
RBC # FLD: 14.9 % — HIGH (ref 10.3–14.5)
SODIUM SERPL-SCNC: 137 MMOL/L — SIGNIFICANT CHANGE UP (ref 135–145)
WBC # BLD: 6.86 K/UL — SIGNIFICANT CHANGE UP (ref 3.8–10.5)
WBC # FLD AUTO: 6.86 K/UL — SIGNIFICANT CHANGE UP (ref 3.8–10.5)

## 2021-09-10 RX ORDER — WARFARIN SODIUM 2.5 MG/1
5 TABLET ORAL ONCE
Refills: 0 | Status: COMPLETED | OUTPATIENT
Start: 2021-09-10 | End: 2021-09-10

## 2021-09-10 RX ADMIN — Medication 1 MILLIGRAM(S): at 12:01

## 2021-09-10 RX ADMIN — CILOSTAZOL 100 MILLIGRAM(S): 100 TABLET ORAL at 18:23

## 2021-09-10 RX ADMIN — ESCITALOPRAM OXALATE 5 MILLIGRAM(S): 10 TABLET, FILM COATED ORAL at 12:01

## 2021-09-10 RX ADMIN — Medication 500 MILLIGRAM(S): at 18:22

## 2021-09-10 RX ADMIN — HEPARIN SODIUM 5000 UNIT(S): 5000 INJECTION INTRAVENOUS; SUBCUTANEOUS at 16:00

## 2021-09-10 RX ADMIN — WARFARIN SODIUM 5 MILLIGRAM(S): 2.5 TABLET ORAL at 22:27

## 2021-09-10 RX ADMIN — SENNA PLUS 2 TABLET(S): 8.6 TABLET ORAL at 22:27

## 2021-09-10 RX ADMIN — HEPARIN SODIUM 5000 UNIT(S): 5000 INJECTION INTRAVENOUS; SUBCUTANEOUS at 05:30

## 2021-09-10 RX ADMIN — PANTOPRAZOLE SODIUM 40 MILLIGRAM(S): 20 TABLET, DELAYED RELEASE ORAL at 05:31

## 2021-09-10 RX ADMIN — Medication 1 APPLICATION(S): at 12:05

## 2021-09-10 RX ADMIN — Medication 325 MILLIGRAM(S): at 12:01

## 2021-09-10 RX ADMIN — Medication 500 MILLIGRAM(S): at 05:31

## 2021-09-10 RX ADMIN — Medication 40 MILLIGRAM(S): at 05:31

## 2021-09-10 RX ADMIN — Medication 20 MILLIEQUIVALENT(S): at 12:01

## 2021-09-10 RX ADMIN — Medication 1 APPLICATION(S): at 18:23

## 2021-09-10 RX ADMIN — Medication 100 MILLIGRAM(S): at 05:31

## 2021-09-10 RX ADMIN — Medication 81 MILLIGRAM(S): at 12:01

## 2021-09-10 RX ADMIN — POLYETHYLENE GLYCOL 3350 17 GRAM(S): 17 POWDER, FOR SOLUTION ORAL at 12:01

## 2021-09-10 RX ADMIN — Medication 1 APPLICATION(S): at 05:32

## 2021-09-10 RX ADMIN — CHLORHEXIDINE GLUCONATE 1 APPLICATION(S): 213 SOLUTION TOPICAL at 08:14

## 2021-09-10 RX ADMIN — Medication 137 MICROGRAM(S): at 05:30

## 2021-09-10 RX ADMIN — ATORVASTATIN CALCIUM 40 MILLIGRAM(S): 80 TABLET, FILM COATED ORAL at 22:27

## 2021-09-10 RX ADMIN — CILOSTAZOL 100 MILLIGRAM(S): 100 TABLET ORAL at 05:30

## 2021-09-10 NOTE — PROGRESS NOTE ADULT - SUBJECTIVE AND OBJECTIVE BOX
CARDIOLOGY     PROGRESS  NOTE   ________________________________________________    CHIEF COMPLAINT:Patient is a 76y old  Male who presents with a chief complaint of sob (09 Sep 2021 10:51)  doing better.  	  REVIEW OF SYSTEMS:  CONSTITUTIONAL: No fever, weight loss, or fatigue  EYES: No eye pain, visual disturbances, or discharge  ENT:  No difficulty hearing, tinnitus, vertigo; No sinus or throat pain  NECK: No pain or stiffness  RESPIRATORY: No cough, wheezing, chills or hemoptysis; No Shortness of Breath  CARDIOVASCULAR: No chest pain, palpitations, passing out, dizziness, or leg swelling  GASTROINTESTINAL: No abdominal or epigastric pain. No nausea, vomiting, or hematemesis; No diarrhea or constipation. No melena or hematochezia.  GENITOURINARY: No dysuria, frequency, hematuria, or incontinence  NEUROLOGICAL: No headaches, memory loss, loss of strength, numbness, or tremors  SKIN: No itching, burning, rashes, or lesions   LYMPH Nodes: No enlarged glands  ENDOCRINE: No heat or cold intolerance; No hair loss  MUSCULOSKELETAL: No joint pain or swelling; No muscle, back, or extremity pain  PSYCHIATRIC: No depression, anxiety, mood swings, or difficulty sleeping  HEME/LYMPH: No easy bruising, or bleeding gums  ALLERGY AND IMMUNOLOGIC: No hives or eczema	    [ ] All others negative	  [ ] Unable to obtain    PHYSICAL EXAM:  T(C): 36.7 (09-10-21 @ 04:30), Max: 37.1 (09-09-21 @ 10:35)  HR: 77 (09-10-21 @ 04:30) (69 - 94)  BP: 125/81 (09-10-21 @ 04:30) (106/56 - 125/81)  RR: 18 (09-10-21 @ 04:30) (18 - 18)  SpO2: 95% (09-10-21 @ 04:30) (94% - 97%)  Wt(kg): --  I&O's Summary    09 Sep 2021 07:01  -  10 Sep 2021 07:00  --------------------------------------------------------  IN: 720 mL / OUT: 1540 mL / NET: -820 mL        Appearance: Normal	  HEENT:   Normal oral mucosa, PERRL, EOMI	  Lymphatic: No lymphadenopathy  Cardiovascular: Normal S1 S2, No JVD, + murmurs, No edema  Respiratory: Lungs clear to auscultation	  Psychiatry: A & O x 3, Mood & affect appropriate  Gastrointestinal:  Soft, Non-tender, + BS	  Skin: No rashes, No ecchymoses, No cyanosis	  Neurologic: Non-focal  Extremities: Normal range of motion, No clubbing, cyanosis or edema  Vascular: Peripheral pulses palpable 2+ bilaterally    MEDICATIONS  (STANDING):  ascorbic acid 500 milliGRAM(s) Oral two times a day  aspirin  chewable 81 milliGRAM(s) Oral daily  atorvastatin 40 milliGRAM(s) Oral at bedtime  chlorhexidine 2% Cloths 1 Application(s) Topical <User Schedule>  cilostazol 100 milliGRAM(s) Oral every 12 hours  clotrimazole 1% Cream 1 Application(s) Topical two times a day  escitalopram 5 milliGRAM(s) Oral daily  ferrous    sulfate 325 milliGRAM(s) Oral daily  folic acid 1 milliGRAM(s) Oral daily  furosemide    Tablet 40 milliGRAM(s) Oral daily  heparin   Injectable 5000 Unit(s) SubCutaneous every 8 hours  hydrocortisone 1% Cream 1 Application(s) Topical daily  levothyroxine 137 MICROGram(s) Oral daily  metoprolol succinate  milliGRAM(s) Oral daily  pantoprazole    Tablet 40 milliGRAM(s) Oral before breakfast  polyethylene glycol 3350 17 Gram(s) Oral daily  potassium chloride    Tablet ER 20 milliEquivalent(s) Oral daily  senna 2 Tablet(s) Oral at bedtime      TELEMETRY: 	    ECG:  	  RADIOLOGY:  OTHER: 	  	  LABS:	 	    CARDIAC MARKERS:                                7.9    6.65  )-----------( 166      ( 09 Sep 2021 05:43 )             24.6     09-10    137  |  101  |  25<H>  ----------------------------<  109<H>  3.7   |  24  |  1.03    Ca    8.9      10 Sep 2021 06:24  Phos  2.8     09-10  Mg     1.7     09-10    TPro  5.6<L>  /  Alb  3.1<L>  /  TBili  0.7  /  DBili  x   /  AST  10  /  ALT  9<L>  /  AlkPhos  87  09-10    proBNP: Serum Pro-Brain Natriuretic Peptide: 1881 pg/mL (08-18 @ 16:10)    Lipid Profile:   HgA1c:   TSH: Thyroid Stimulating Hormone, Serum: 5.02 uIU/mL (08-25 @ 22:25)    PT/INR - ( 09 Sep 2021 05:43 )   PT: 18.0 sec;   INR: 1.53 ratio         PTT - ( 09 Sep 2021 05:43 )  PTT:27.7 sec      Assessment and plan  ---------------------------  s/p CABG 2 vessels and MVR/ bio  post op bleeding, s/p blood transfusion  extubated  hgb stable  ppm noted hr has decreased to hr  to 600 jose 120 AAAIR/ DDDR  s/p extubation  ?AC post MVR as per ct surgery  need to change PPM  battery prior to Discharge  oob to  chair  physical therapy  PPM almost EOL, plan to change the ppm battery prior to dc ? Tuesday as long as inr less than 1.8  pt with hx of PAF, now in a.fib ?amio load will consider possible cardioversion latter on  a.fib hr is well controlled  doing well, post ppm battery change  ac with coumadin, fu inr  continue diuretics  ct was reviewed by ct surgery no intervention for retrosternal hematoma  fu hgb  increase ambultion  ma repeat chest x ray tomorrow  ppm site clean and dry

## 2021-09-10 NOTE — PROGRESS NOTE ADULT - ASSESSMENT
77 yo  h/o PPM , CAD.  LYMPHEDEMA. HTN, MR,  a.fib on no ac secondary to frequent falls, PVD on pletal presents with SOB. Cardiac workup demonstrates severe MR and multivessel disease.    On 21, pt underwent MVR-t/CABG x 2 w/ LIMA  intraop & post-op bleeding w/ multiple products given ? TRALI-hypoxic- lasix gtt-     EP increased back up rate to 60 - .    gtt - rapid afib -  d/c'd, bb   lasix gtt d/c'd - EXTUBATED.    SDU - BB Increased.  -battery end-of-life - will change battery on  if INR < 1.8 per DR. Christensen   VSS afib 90s  - low dose coumadin - NPO for generator change PPM w/ DR. Christensen.  ck inr in am   d/c plan    Generator change today.  Change to toprol xl 100  Ambulation  Anticoagulation    s/p ppm generator change yesterday, dressing cdi.  Pt with LLL consolidation, collapse vs effusion, ct non con today  d/c pw   CT stble, reviewed with Dr grider, no further intervention.  Started on diuretics Transfer to floor. m Increase PT pw out  9/10 Continue with diuresis, remains +2Kg above preop weight. Anticoagulation for afib and MVR. Pending rehab placement. Chest PT for LLL atelectasis

## 2021-09-10 NOTE — PROGRESS NOTE ADULT - SUBJECTIVE AND OBJECTIVE BOX
VITAL SIGNS    Telemetry: afib 70-90  Vital Signs Last 24 Hrs  T(C): 36.7 (09-10-21 @ 04:30), Max: 37 (21 @ 19:34)  T(F): 98.1 (09-10-21 @ 04:30), Max: 98.6 (21 @ 19:34)  HR: 77 (09-10-21 @ 04:30) (69 - 94)  BP: 125/81 (09-10-21 @ 04:30) (109/68 - 125/81)  RR: 18 (09-10-21 @ 04:30) (18 - 18)  SpO2: 95% (09-10-21 @ 04:30) (95% - 97%)             07:01  -  09-10 @ 07:00  --------------------------------------------------------  IN: 720 mL / OUT: 1540 mL / NET: -820 mL    09-10 @ 07:01  -  09-10 @ 11:19  --------------------------------------------------------  IN: 360 mL / OUT: 300 mL / NET: 60 mL       Daily     Daily Weight in k (10 Sep 2021 08:00)  Admit Wt: Drug Dosing Weight  Height (cm): 170.2 (02 Sep 2021 20:29)  Weight (kg): 96.2 (30 Aug 2021 13:07)  BMI (kg/m2): 33.2 (02 Sep 2021 20:29)  BSA (m2): 2.07 (02 Sep 2021 20:29)    Bilirubin Total, Serum: 0.7 mg/dL (09-10 @ 06:24)    MEDICATIONS  acetaminophen   Tablet .. 650 milliGRAM(s) Oral every 6 hours PRN  ascorbic acid 500 milliGRAM(s) Oral two times a day  aspirin  chewable 81 milliGRAM(s) Oral daily  atorvastatin 40 milliGRAM(s) Oral at bedtime  chlorhexidine 2% Cloths 1 Application(s) Topical <User Schedule>  cilostazol 100 milliGRAM(s) Oral every 12 hours  clotrimazole 1% Cream 1 Application(s) Topical two times a day  escitalopram 5 milliGRAM(s) Oral daily  ferrous    sulfate 325 milliGRAM(s) Oral daily  folic acid 1 milliGRAM(s) Oral daily  furosemide    Tablet 40 milliGRAM(s) Oral daily  heparin   Injectable 5000 Unit(s) SubCutaneous every 8 hours  hydrocortisone 1% Cream 1 Application(s) Topical daily  levothyroxine 137 MICROGram(s) Oral daily  metoprolol succinate  milliGRAM(s) Oral daily  pantoprazole    Tablet 40 milliGRAM(s) Oral before breakfast  polyethylene glycol 3350 17 Gram(s) Oral daily  potassium chloride    Tablet ER 20 milliEquivalent(s) Oral daily  senna 2 Tablet(s) Oral at bedtime      >>> <<<  PHYSICAL EXAM  Subjective: NAD  Neurology: alert and oriented x 3, nonfocal, no gross deficits  CV : s1s2  Sternal Wound :  CDI , Stable  Lungs: CTA   Abdomen: soft, NT,ND, (+ )BM  :  voiding  Extremities: -c/c/e      LABS  09-10    137  |  101  |  25<H>  ----------------------------<  109<H>  3.7   |  24  |  1.03    Ca    8.9      10 Sep 2021 06:24  Phos  2.8     09-10  Mg     1.7     09-10    TPro  5.6<L>  /  Alb  3.1<L>  /  TBili  0.7  /  DBili  x   /  AST  10  /  ALT  9<L>  /  AlkPhos  87  10                                 7.9    6.65  )-----------( 166      ( 09 Sep 2021 05:43 )             24.6          PT/INR - ( 09 Sep 2021 05:43 )   PT: 18.0 sec;   INR: 1.53 ratio         PTT - ( 09 Sep 2021 05:43 )  PTT:27.7 sec   < from: CT Chest No Cont (21 @ 08:32) >  Left pleural effusion with near complete left lower lobe atelectasis.    6.7 x 2.8 cm retrosternal hematoma.    < end of copied text >      PAST MEDICAL & SURGICAL HISTORY:  Afib  not on anticoagulation    CAD (coronary artery disease)    Varicose vein of leg    Hypothyroid    Pacemaker    H/O fracture of hip      H/O asbestosis    HTN (hypertension)    Pacemaker  Medtronic - Model RVDR01 1/10/2012    Stented coronary artery  drug eluding stent 2007    H/O detached retina repair      S/P cataract surgery    History of hip surgery  left hip ORIF    H/O varicose vein stripping   left leg    History of left knee replacement   - multiple infections post op requiring reoperation in , , )    H/O foot surgery  for infection of right foot     H/O inguinal hernia repair  right           VITAL SIGNS    Telemetry: afib 70-90  Vital Signs Last 24 Hrs  T(C): 36.7 (09-10-21 @ 04:30), Max: 37 (21 @ 19:34)  T(F): 98.1 (09-10-21 @ 04:30), Max: 98.6 (21 @ 19:34)  HR: 77 (09-10-21 @ 04:30) (69 - 94)  BP: 125/81 (09-10-21 @ 04:30) (109/68 - 125/81)  RR: 18 (09-10-21 @ 04:30) (18 - 18)  SpO2: 95% (09-10-21 @ 04:30) (95% - 97%)             07:01  -  09-10 @ 07:00  --------------------------------------------------------  IN: 720 mL / OUT: 1540 mL / NET: -820 mL    09-10 @ 07:01  -  09-10 @ 11:19  --------------------------------------------------------  IN: 360 mL / OUT: 300 mL / NET: 60 mL       Daily     Daily Weight in k (10 Sep 2021 08:00)  Admit Wt: Drug Dosing Weight  Height (cm): 170.2 (02 Sep 2021 20:29)  Weight (kg): 96.2 (30 Aug 2021 13:07)  BMI (kg/m2): 33.2 (02 Sep 2021 20:29)  BSA (m2): 2.07 (02 Sep 2021 20:29)    Bilirubin Total, Serum: 0.7 mg/dL (09-10 @ 06:24)    MEDICATIONS  acetaminophen   Tablet .. 650 milliGRAM(s) Oral every 6 hours PRN  ascorbic acid 500 milliGRAM(s) Oral two times a day  aspirin  chewable 81 milliGRAM(s) Oral daily  atorvastatin 40 milliGRAM(s) Oral at bedtime  chlorhexidine 2% Cloths 1 Application(s) Topical <User Schedule>  cilostazol 100 milliGRAM(s) Oral every 12 hours  clotrimazole 1% Cream 1 Application(s) Topical two times a day  escitalopram 5 milliGRAM(s) Oral daily  ferrous    sulfate 325 milliGRAM(s) Oral daily  folic acid 1 milliGRAM(s) Oral daily  furosemide    Tablet 40 milliGRAM(s) Oral daily  heparin   Injectable 5000 Unit(s) SubCutaneous every 8 hours  hydrocortisone 1% Cream 1 Application(s) Topical daily  levothyroxine 137 MICROGram(s) Oral daily  metoprolol succinate  milliGRAM(s) Oral daily  pantoprazole    Tablet 40 milliGRAM(s) Oral before breakfast  polyethylene glycol 3350 17 Gram(s) Oral daily  potassium chloride    Tablet ER 20 milliEquivalent(s) Oral daily  senna 2 Tablet(s) Oral at bedtime      >>> <<<  PHYSICAL EXAM  Subjective: NAD  Neurology: alert and oriented x 3, nonfocal, no gross deficits  CV : s1s2   Left ant chest wall PPM site c/d/i  Sternal Wound :  CDI , Stable  Lungs: CTA   Abdomen: soft, NT,ND, (+ )BM  :  voiding  Extremities: -c/c/e      LABS  09-10    137  |  101  |  25<H>  ----------------------------<  109<H>  3.7   |  24  |  1.03    Ca    8.9      10 Sep 2021 06:24  Phos  2.8     09-10  Mg     1.7     09-10    TPro  5.6<L>  /  Alb  3.1<L>  /  TBili  0.7  /  DBili  x   /  AST  10  /  ALT  9<L>  /  AlkPhos  87  10                                 7.9    6.65  )-----------( 166      ( 09 Sep 2021 05:43 )             24.6          PT/INR - ( 09 Sep 2021 05:43 )   PT: 18.0 sec;   INR: 1.53 ratio         PTT - ( 09 Sep 2021 05:43 )  PTT:27.7 sec   < from: CT Chest No Cont (21 @ 08:32) >  Left pleural effusion with near complete left lower lobe atelectasis.    6.7 x 2.8 cm retrosternal hematoma.    < end of copied text >      PAST MEDICAL & SURGICAL HISTORY:  Afib  not on anticoagulation    CAD (coronary artery disease)    Varicose vein of leg    Hypothyroid    Pacemaker    H/O fracture of hip      H/O asbestosis    HTN (hypertension)    Pacemaker  Medtronic - Model RVDR01 1/10/2012    Stented coronary artery  drug eluding stent 2007    H/O detached retina repair      S/P cataract surgery    History of hip surgery  left hip ORIF    H/O varicose vein stripping   left leg    History of left knee replacement   - multiple infections post op requiring reoperation in , , )    H/O foot surgery  for infection of right foot     H/O inguinal hernia repair  right

## 2021-09-11 LAB
ALBUMIN SERPL ELPH-MCNC: 2.9 G/DL — LOW (ref 3.3–5)
ALP SERPL-CCNC: 91 U/L — SIGNIFICANT CHANGE UP (ref 40–120)
ALT FLD-CCNC: 8 U/L — LOW (ref 10–45)
ANION GAP SERPL CALC-SCNC: 14 MMOL/L — SIGNIFICANT CHANGE UP (ref 5–17)
AST SERPL-CCNC: 11 U/L — SIGNIFICANT CHANGE UP (ref 10–40)
BILIRUB SERPL-MCNC: 0.7 MG/DL — SIGNIFICANT CHANGE UP (ref 0.2–1.2)
BUN SERPL-MCNC: 25 MG/DL — HIGH (ref 7–23)
CALCIUM SERPL-MCNC: 9 MG/DL — SIGNIFICANT CHANGE UP (ref 8.4–10.5)
CHLORIDE SERPL-SCNC: 101 MMOL/L — SIGNIFICANT CHANGE UP (ref 96–108)
CO2 SERPL-SCNC: 22 MMOL/L — SIGNIFICANT CHANGE UP (ref 22–31)
CREAT SERPL-MCNC: 0.86 MG/DL — SIGNIFICANT CHANGE UP (ref 0.5–1.3)
GLUCOSE SERPL-MCNC: 121 MG/DL — HIGH (ref 70–99)
HCT VFR BLD CALC: 25.2 % — LOW (ref 39–50)
HGB BLD-MCNC: 7.9 G/DL — LOW (ref 13–17)
INR BLD: 1.85 RATIO — HIGH (ref 0.88–1.16)
MCHC RBC-ENTMCNC: 28.7 PG — SIGNIFICANT CHANGE UP (ref 27–34)
MCHC RBC-ENTMCNC: 31.3 GM/DL — LOW (ref 32–36)
MCV RBC AUTO: 91.6 FL — SIGNIFICANT CHANGE UP (ref 80–100)
NRBC # BLD: 0 /100 WBCS — SIGNIFICANT CHANGE UP (ref 0–0)
PLATELET # BLD AUTO: 198 K/UL — SIGNIFICANT CHANGE UP (ref 150–400)
POTASSIUM SERPL-MCNC: 3.5 MMOL/L — SIGNIFICANT CHANGE UP (ref 3.5–5.3)
POTASSIUM SERPL-SCNC: 3.5 MMOL/L — SIGNIFICANT CHANGE UP (ref 3.5–5.3)
PROT SERPL-MCNC: 5.7 G/DL — LOW (ref 6–8.3)
PROTHROM AB SERPL-ACNC: 21.6 SEC — HIGH (ref 10.6–13.6)
RBC # BLD: 2.75 M/UL — LOW (ref 4.2–5.8)
RBC # FLD: 15 % — HIGH (ref 10.3–14.5)
SODIUM SERPL-SCNC: 137 MMOL/L — SIGNIFICANT CHANGE UP (ref 135–145)
WBC # BLD: 7.22 K/UL — SIGNIFICANT CHANGE UP (ref 3.8–10.5)
WBC # FLD AUTO: 7.22 K/UL — SIGNIFICANT CHANGE UP (ref 3.8–10.5)

## 2021-09-11 RX ORDER — POTASSIUM CHLORIDE 20 MEQ
40 PACKET (EA) ORAL ONCE
Refills: 0 | Status: COMPLETED | OUTPATIENT
Start: 2021-09-11 | End: 2021-09-11

## 2021-09-11 RX ORDER — WARFARIN SODIUM 2.5 MG/1
5 TABLET ORAL ONCE
Refills: 0 | Status: COMPLETED | OUTPATIENT
Start: 2021-09-11 | End: 2021-09-11

## 2021-09-11 RX ORDER — SPIRONOLACTONE 25 MG/1
25 TABLET, FILM COATED ORAL EVERY 12 HOURS
Refills: 0 | Status: DISCONTINUED | OUTPATIENT
Start: 2021-09-11 | End: 2021-09-16

## 2021-09-11 RX ADMIN — HEPARIN SODIUM 5000 UNIT(S): 5000 INJECTION INTRAVENOUS; SUBCUTANEOUS at 06:48

## 2021-09-11 RX ADMIN — PANTOPRAZOLE SODIUM 40 MILLIGRAM(S): 20 TABLET, DELAYED RELEASE ORAL at 06:44

## 2021-09-11 RX ADMIN — SPIRONOLACTONE 25 MILLIGRAM(S): 25 TABLET, FILM COATED ORAL at 17:36

## 2021-09-11 RX ADMIN — Medication 500 MILLIGRAM(S): at 06:44

## 2021-09-11 RX ADMIN — CILOSTAZOL 100 MILLIGRAM(S): 100 TABLET ORAL at 06:45

## 2021-09-11 RX ADMIN — SENNA PLUS 2 TABLET(S): 8.6 TABLET ORAL at 21:40

## 2021-09-11 RX ADMIN — HEPARIN SODIUM 5000 UNIT(S): 5000 INJECTION INTRAVENOUS; SUBCUTANEOUS at 21:40

## 2021-09-11 RX ADMIN — Medication 500 MILLIGRAM(S): at 17:34

## 2021-09-11 RX ADMIN — POLYETHYLENE GLYCOL 3350 17 GRAM(S): 17 POWDER, FOR SOLUTION ORAL at 12:19

## 2021-09-11 RX ADMIN — Medication 137 MICROGRAM(S): at 06:44

## 2021-09-11 RX ADMIN — HEPARIN SODIUM 5000 UNIT(S): 5000 INJECTION INTRAVENOUS; SUBCUTANEOUS at 15:18

## 2021-09-11 RX ADMIN — Medication 40 MILLIGRAM(S): at 06:44

## 2021-09-11 RX ADMIN — Medication 1 APPLICATION(S): at 17:33

## 2021-09-11 RX ADMIN — Medication 325 MILLIGRAM(S): at 12:20

## 2021-09-11 RX ADMIN — Medication 40 MILLIEQUIVALENT(S): at 08:25

## 2021-09-11 RX ADMIN — WARFARIN SODIUM 5 MILLIGRAM(S): 2.5 TABLET ORAL at 21:40

## 2021-09-11 RX ADMIN — Medication 100 MILLIGRAM(S): at 06:44

## 2021-09-11 RX ADMIN — ESCITALOPRAM OXALATE 5 MILLIGRAM(S): 10 TABLET, FILM COATED ORAL at 12:19

## 2021-09-11 RX ADMIN — Medication 81 MILLIGRAM(S): at 12:20

## 2021-09-11 RX ADMIN — CILOSTAZOL 100 MILLIGRAM(S): 100 TABLET ORAL at 17:34

## 2021-09-11 RX ADMIN — ATORVASTATIN CALCIUM 40 MILLIGRAM(S): 80 TABLET, FILM COATED ORAL at 21:40

## 2021-09-11 RX ADMIN — CHLORHEXIDINE GLUCONATE 1 APPLICATION(S): 213 SOLUTION TOPICAL at 10:21

## 2021-09-11 RX ADMIN — Medication 1 APPLICATION(S): at 12:20

## 2021-09-11 RX ADMIN — Medication 20 MILLIEQUIVALENT(S): at 12:19

## 2021-09-11 RX ADMIN — Medication 1 APPLICATION(S): at 06:45

## 2021-09-11 RX ADMIN — Medication 1 MILLIGRAM(S): at 12:19

## 2021-09-11 NOTE — PROGRESS NOTE ADULT - SUBJECTIVE AND OBJECTIVE BOX
Subjective: Pt states "Hello" denies any CP or SOB. No acute events overnight.     Telemetry:  Afib 80 - 90s  Vital Signs Last 24 Hrs  T(C): 37 (21 @ 04:53), Max: 37 (21 @ 04:53)  T(F): 98.6 (21 @ 04:53), Max: 98.6 (21 @ 04:53)  HR: 60 (21 @ 04:53) (60 - 80)  BP: 110/61 (21 @ 04:53) (99/62 - 113/66)  RR: 18 (21 @ 04:53) (18 - 18)  SpO2: 96% (21 @ 04:53) (92% - 96%)             09-10 @ 07:01  -   @ 07:00  --------------------------------------------------------  IN: 920 mL / OUT: 1100 mL / NET: -180 mL        Daily Weight in k (10 Sep 2021 08:00)                        7.9    7.22  )-----------( 198      ( 11 Sep 2021 05:59 )             25.2     137  |  101  |  25<H>  ----------------------------<  121<H>  3.5   |  22  |  0.86    AST  11  /  ALT  8<L>  /  AlkPhos  91           PHYSICAL EXAM  Neurology: A&Ox3, NAD  CV : RRR+S1S2  Sternal Wound: MSI CDI RAQUEL, Stable  Lungs: Respirations non-labored, B/L BS clear, diminished at bases  Abdomen: Soft, NT/ND, +BSx4Q, last BM   (-)N/V/D  : Voiding without difficulty  Extremities: B/L LE +1 edema, negative calf tenderness, +PP, RLE SVG incision CDI           MEDICATIONS  acetaminophen   Tablet .. 650 milliGRAM(s) Oral every 6 hours PRN  ascorbic acid 500 milliGRAM(s) Oral two times a day  aspirin  chewable 81 milliGRAM(s) Oral daily  atorvastatin 40 milliGRAM(s) Oral at bedtime  chlorhexidine 2% Cloths 1 Application(s) Topical <User Schedule>  cilostazol 100 milliGRAM(s) Oral every 12 hours  clotrimazole 1% Cream 1 Application(s) Topical two times a day  escitalopram 5 milliGRAM(s) Oral daily  ferrous    sulfate 325 milliGRAM(s) Oral daily  folic acid 1 milliGRAM(s) Oral daily  furosemide    Tablet 40 milliGRAM(s) Oral daily  heparin   Injectable 5000 Unit(s) SubCutaneous every 8 hours  hydrocortisone 1% Cream 1 Application(s) Topical daily  levothyroxine 137 MICROGram(s) Oral daily  metoprolol succinate  milliGRAM(s) Oral daily  pantoprazole    Tablet 40 milliGRAM(s) Oral before breakfast  polyethylene glycol 3350 17 Gram(s) Oral daily  potassium chloride    Tablet ER 20 milliEquivalent(s) Oral daily  senna 2 Tablet(s) Oral at bedtime      Physical Therapy Rec:   Home  [  ]   Home w/ PT  [  ]  Rehab  [ X ]    Discussed with Cardiothoracic Team at AM rounds. Subjective: Pt states "Hello" denies any CP or SOB. No acute events overnight.     Telemetry:  Afib 80 - 90s  Vital Signs Last 24 Hrs  T(C): 37 (21 @ 04:53), Max: 37 (21 @ 04:53)  T(F): 98.6 (21 @ 04:53), Max: 98.6 (21 @ 04:53)  HR: 60 (21 @ 04:53) (60 - 80)  BP: 110/61 (21 @ 04:53) (99/62 - 113/66)  RR: 18 (21 @ 04:53) (18 - 18)  SpO2: 96% (21 @ 04:53) (92% - 96%)             09-10 @ 07:01  -   @ 07:00  --------------------------------------------------------  IN: 920 mL / OUT: 1100 mL / NET: -180 mL        Daily Weight in k (10 Sep 2021 08:00)                        7.9    7.22  )-----------( 198      ( 11 Sep 2021 05:59 )             25.2     137  |  101  |  25<H>  ----------------------------<  121<H>  3.5   |  22  |  0.86    AST  11  /  ALT  8<L>  /  AlkPhos  91           PHYSICAL EXAM  Neurology: A&Ox3, NAD  CV : RRR+S1S2  Sternal Wound: MSI CDI RAQUEL, Stable  Lungs: Respirations non-labored, B/L BS clear, diminished at bases  Abdomen: Soft, NT/ND, +BSx4Q, last BM   (-)N/V/D  : Voiding without difficulty  Extremities: B/L LE +1 edema, negative calf tenderness, +PP, RLE SVG incision CDI           MEDICATIONS  acetaminophen   Tablet .. 650 milliGRAM(s) Oral every 6 hours PRN  ascorbic acid 500 milliGRAM(s) Oral two times a day  aspirin  chewable 81 milliGRAM(s) Oral daily  atorvastatin 40 milliGRAM(s) Oral at bedtime  chlorhexidine 2% Cloths 1 Application(s) Topical <User Schedule>  cilostazol 100 milliGRAM(s) Oral every 12 hours  clotrimazole 1% Cream 1 Application(s) Topical two times a day  escitalopram 5 milliGRAM(s) Oral daily  ferrous    sulfate 325 milliGRAM(s) Oral daily  folic acid 1 milliGRAM(s) Oral daily  furosemide    Tablet 40 milliGRAM(s) Oral Regular diet - low fat, low cholesterol, no added salt.aily  heparin   Injectable 5000 Unit(s) SubCutaneous every 8 hours  hydrocortisone 1% Cream 1 Application(s) Topical daily  levothyroxine 137 MICROGram(s) Oral daily  metoprolol succinate  milliGRAM(s) Oral daily  pantoprazole    Tablet 40 milliGRAM(s) Oral before breakfast  polyethylene glycol 3350 17 Gram(s) Oral daily  potassium chloride    Tablet ER 20 milliEquivalent(s) Oral daily  senna 2 Tablet(s) Oral at bedtime      Physical Therapy Rec:   Home  [  ]   Home w/ PT  [  ]  Rehab  [ X ]    Discussed with Cardiothoracic Team at AM rounds.

## 2021-09-11 NOTE — PROGRESS NOTE ADULT - PROBLEM SELECTOR PLAN 1
s/p MVR 8/30  coumadin daily  bb s/p MVR 8/30  Continue anticoagulation on coumadin   daily PT/INR  Continue Toprol  daily  Continue diuresis on lasix 40 daily and aldactone 25 BID  Cough and deep breathe, Incentive Spirometry Q1h, Chest PT.  Ambulate 4x daily as tolerated and with PT.  Disposition: Subacute Rehab next week

## 2021-09-11 NOTE — PROGRESS NOTE ADULT - ASSESSMENT
77 yo  h/o PPM , CAD.  LYMPHEDEMA. HTN, MR,  a.fib on no ac secondary to frequent falls, PVD on pletal presents with SOB. Cardiac workup demonstrates severe MR and multivessel disease.    On 21, pt underwent MVR-t/CABG x 2 w/ LIMA  intraop & post-op bleeding w/ multiple products given ? TRALI-hypoxic- lasix gtt-     EP increased back up rate to 60 - .    gtt - rapid afib -  d/c'd, bb   lasix gtt d/c'd - EXTUBATED.    SDU - BB Increased.  -battery end-of-life - will change battery on  if INR < 1.8 per DR. Christensen   VSS afib 90s  - low dose coumadin - NPO for generator change PPM w/ DR. Christensen.  ck inr in am   d/c plan    Generator change today.  Change to toprol xl 100  Ambulation  Anticoagulation    s/p ppm generator change yesterday, dressing cdi.  Pt with LLL consolidation, collapse vs effusion, ct non con today  d/c pw   CT stble, reviewed with Dr grider, no further intervention.  Started on diuretics Transfer to floor. m Increase PT pw out  9/10 Continue with diuresis, remains +2Kg above preop weight. Anticoagulation for afib and MVR. Pending rehab placement. Chest PT for LLL atelectasis 77 yo  h/o PPM , CAD.  LYMPHEDEMA. HTN, MR,  a.fib on no ac secondary to frequent falls, PVD on pletal presents with SOB. Cardiac workup demonstrates severe MR and multivessel disease.    On 21, pt underwent MVR-t/CABG x 2 w/ LIMA  intraop & post-op bleeding w/ multiple products given ? TRALI-hypoxic- lasix gtt-     EP increased back up rate to 60 - .    gtt - rapid afib -  d/c'd, bb   lasix gtt d/c'd - EXTUBATED.    SDU - BB Increased.  -battery end-of-life - will change battery on  if INR < 1.8 per DR. Christensen   VSS afib 90s  - low dose coumadin - NPO for generator change PPM w/ DR. Christensen.  ck inr in am   d/c plan    Generator change today.  Change to toprol xl 100  Ambulation  Anticoagulation    s/p ppm generator change yesterday, dressing cdi.  Pt with LLL consolidation, collapse vs effusion, ct non con today  d/c pw   CT stble, reviewed with Dr grider, no further intervention.  Started on diuretics Transfer to floor. Increase PT pw out  9/10 Continue with diuresis, remains +2Kg above preop weight. Anticoagulation for afib and MVR. Pending rehab placement. Chest PT for LLL atelectasis   VSS, INR 1.85 Coumadin 5mg. Aldactone 25 BID added for edema. Plan for discharge to Rehab Monday.

## 2021-09-11 NOTE — PROGRESS NOTE ADULT - SUBJECTIVE AND OBJECTIVE BOX
CARDIOLOGY     PROGRESS  NOTE   ________________________________________________    CHIEF COMPLAINT:Patient is a 76y old  Male who presents with a chief complaint of sob (11 Sep 2021 07:18)  doing better.  	  REVIEW OF SYSTEMS:  CONSTITUTIONAL: No fever, weight loss, or fatigue  EYES: No eye pain, visual disturbances, or discharge  ENT:  No difficulty hearing, tinnitus, vertigo; No sinus or throat pain  NECK: No pain or stiffness  RESPIRATORY: No cough, wheezing, chills or hemoptysis; +Shortness of Breath  CARDIOVASCULAR: No chest pain, palpitations, passing out, dizziness, or leg swelling  GASTROINTESTINAL: No abdominal or epigastric pain. No nausea, vomiting, or hematemesis; No diarrhea or constipation. No melena or hematochezia.  GENITOURINARY: No dysuria, frequency, hematuria, or incontinence  NEUROLOGICAL: No headaches, memory loss, loss of strength, numbness, or tremors  SKIN: No itching, burning, rashes, or lesions   LYMPH Nodes: No enlarged glands  ENDOCRINE: No heat or cold intolerance; No hair loss  MUSCULOSKELETAL: No joint pain or swelling; No muscle, back, or extremity pain  PSYCHIATRIC: No depression, anxiety, mood swings, or difficulty sleeping  HEME/LYMPH: No easy bruising, or bleeding gums  ALLERGY AND IMMUNOLOGIC: No hives or eczema	    [ ] All others negative	  [ ] Unable to obtain    PHYSICAL EXAM:  T(C): 37 (09-11-21 @ 04:53), Max: 37 (09-11-21 @ 04:53)  HR: 60 (09-11-21 @ 04:53) (60 - 80)  BP: 110/61 (09-11-21 @ 04:53) (99/62 - 113/66)  RR: 18 (09-11-21 @ 04:53) (18 - 18)  SpO2: 96% (09-11-21 @ 04:53) (92% - 96%)  Wt(kg): --  I&O's Summary    10 Sep 2021 07:01  -  11 Sep 2021 07:00  --------------------------------------------------------  IN: 920 mL / OUT: 1100 mL / NET: -180 mL    11 Sep 2021 07:01  -  11 Sep 2021 10:10  --------------------------------------------------------  IN: 120 mL / OUT: 200 mL / NET: -80 mL        Appearance: Normal	  HEENT:   Normal oral mucosa, PERRL, EOMI	  Lymphatic: No lymphadenopathy  Cardiovascular: Normal S1 S2, No JVD, + murmurs, No edema  Respiratory: Lungs clear to auscultation	  Psychiatry: A & O x 3, Mood & affect appropriate  Gastrointestinal:  Soft, Non-tender, + BS	  Skin: No rashes, No ecchymoses, No cyanosis	  Neurologic: Non-focal  Extremities: Normal range of motion, No clubbing, cyanosis or edema  Vascular: Peripheral pulses palpable 2+ bilaterally    MEDICATIONS  (STANDING):  ascorbic acid 500 milliGRAM(s) Oral two times a day  aspirin  chewable 81 milliGRAM(s) Oral daily  atorvastatin 40 milliGRAM(s) Oral at bedtime  chlorhexidine 2% Cloths 1 Application(s) Topical <User Schedule>  cilostazol 100 milliGRAM(s) Oral every 12 hours  clotrimazole 1% Cream 1 Application(s) Topical two times a day  escitalopram 5 milliGRAM(s) Oral daily  ferrous    sulfate 325 milliGRAM(s) Oral daily  folic acid 1 milliGRAM(s) Oral daily  furosemide    Tablet 40 milliGRAM(s) Oral daily  heparin   Injectable 5000 Unit(s) SubCutaneous every 8 hours  hydrocortisone 1% Cream 1 Application(s) Topical daily  levothyroxine 137 MICROGram(s) Oral daily  metoprolol succinate  milliGRAM(s) Oral daily  pantoprazole    Tablet 40 milliGRAM(s) Oral before breakfast  polyethylene glycol 3350 17 Gram(s) Oral daily  potassium chloride    Tablet ER 20 milliEquivalent(s) Oral daily  senna 2 Tablet(s) Oral at bedtime  warfarin 5 milliGRAM(s) Oral once      TELEMETRY: 	    ECG:  	  RADIOLOGY:  OTHER: 	  	  LABS:	 	    CARDIAC MARKERS:                                7.9    7.22  )-----------( 198      ( 11 Sep 2021 05:59 )             25.2     09-11    137  |  101  |  25<H>  ----------------------------<  121<H>  3.5   |  22  |  0.86    Ca    9.0      11 Sep 2021 05:59  Phos  2.8     09-10  Mg     1.7     09-10    TPro  5.7<L>  /  Alb  2.9<L>  /  TBili  0.7  /  DBili  x   /  AST  11  /  ALT  8<L>  /  AlkPhos  91  09-11    proBNP: Serum Pro-Brain Natriuretic Peptide: 1881 pg/mL (08-18 @ 16:10)    Lipid Profile:   HgA1c:   TSH: Thyroid Stimulating Hormone, Serum: 5.02 uIU/mL (08-25 @ 22:25)    PT/INR - ( 11 Sep 2021 05:59 )   PT: 21.6 sec;   INR: 1.85 ratio         PTT - ( 10 Sep 2021 11:36 )  PTT:33.1 sec  < from: TTE with Doppler (w/Cont) (09.03.21 @ 15:30) >  1. Bioprosthetic mitral valve replacement.   The valve is  well seated.  Minimal mitral regurgitation. Peak mitral  valve gradient equals 8 mm Hg, mean transmitral valve  gradient equals 4 mm Hg, which is probably normal in the  setting of a bioprosthetic mitral valve replacement.  Gradients measured at a HR of 82bpm.  2. Normal trileaflet aortic valve. Mild aortic  regurgitation.  3. Endocardial visualization enhanced with intravenous  injection of Ultrasonic Enhancing Agent (Definity). No left  ventricular thrombus. Normal left ventricular systolic  function. No segmental wall motion abnormalities. Septal  motion consistent with cardiac surgery.  4. The right ventricle is not well visualized.      < from: Xray Chest 1 View- PORTABLE-Urgent (Xray Chest 1 View- PORTABLE-Urgent .) (09.09.21 @ 07:31) >  IMPRESSION:  Moderate size left pleural effusion with likely associated passive atelectasis, not significantly changed.      Assessment and plan  ---------------------------  s/p CABG 2 vessels and MVR/ bio  post op bleeding, s/p blood transfusion  extubated  hgb stable  ppm noted hr has decreased to hr  to 600 jose 120 AAAIR/ DDDR  s/p extubation  ?AC post MVR as per ct surgery  need to change PPM  battery prior to Discharge  oob to  chair  physical therapy  PPM almost EOL, plan to change the ppm battery prior to dc ? Tuesday as long as inr less than 1.8  pt with hx of PAF, now in a.fib ?amio load will consider possible cardioversion latter on  a.fib hr is well controlled  doing well, post ppm battery change  ac with coumadin, fu inr  continue diuretics  ct was reviewed by ct surgery no intervention for retrosternal hematoma  fu hgb  increase ambultion  ma repeat chest x ray tomorrow  ppm site clean and dry  chest x ray noted with  mod  l pleural effusion, continue diuresis, if not resolves need to be tapped  continue current meds

## 2021-09-11 NOTE — PROGRESS NOTE ADULT - PROBLEM SELECTOR PLAN 5
asa/bb/statin  +pw  afib Continue asa81 daily, ToprolXL 100 daily, atorvastatin 40 HS  Titrate up beta-blocker as tolerated

## 2021-09-12 LAB
ALBUMIN SERPL ELPH-MCNC: 2.9 G/DL — LOW (ref 3.3–5)
ALP SERPL-CCNC: 95 U/L — SIGNIFICANT CHANGE UP (ref 40–120)
ALT FLD-CCNC: 10 U/L — SIGNIFICANT CHANGE UP (ref 10–45)
ANION GAP SERPL CALC-SCNC: 13 MMOL/L — SIGNIFICANT CHANGE UP (ref 5–17)
AST SERPL-CCNC: 13 U/L — SIGNIFICANT CHANGE UP (ref 10–40)
BILIRUB SERPL-MCNC: 0.7 MG/DL — SIGNIFICANT CHANGE UP (ref 0.2–1.2)
BUN SERPL-MCNC: 23 MG/DL — SIGNIFICANT CHANGE UP (ref 7–23)
CALCIUM SERPL-MCNC: 8.7 MG/DL — SIGNIFICANT CHANGE UP (ref 8.4–10.5)
CHLORIDE SERPL-SCNC: 101 MMOL/L — SIGNIFICANT CHANGE UP (ref 96–108)
CO2 SERPL-SCNC: 21 MMOL/L — LOW (ref 22–31)
CREAT SERPL-MCNC: 0.96 MG/DL — SIGNIFICANT CHANGE UP (ref 0.5–1.3)
GLUCOSE SERPL-MCNC: 114 MG/DL — HIGH (ref 70–99)
HCT VFR BLD CALC: 24.8 % — LOW (ref 39–50)
HGB BLD-MCNC: 8 G/DL — LOW (ref 13–17)
INR BLD: 2.07 RATIO — HIGH (ref 0.88–1.16)
MAGNESIUM SERPL-MCNC: 1.6 MG/DL — SIGNIFICANT CHANGE UP (ref 1.6–2.6)
MCHC RBC-ENTMCNC: 29.4 PG — SIGNIFICANT CHANGE UP (ref 27–34)
MCHC RBC-ENTMCNC: 32.3 GM/DL — SIGNIFICANT CHANGE UP (ref 32–36)
MCV RBC AUTO: 91.2 FL — SIGNIFICANT CHANGE UP (ref 80–100)
NRBC # BLD: 0 /100 WBCS — SIGNIFICANT CHANGE UP (ref 0–0)
PHOSPHATE SERPL-MCNC: 2.8 MG/DL — SIGNIFICANT CHANGE UP (ref 2.5–4.5)
PLATELET # BLD AUTO: 215 K/UL — SIGNIFICANT CHANGE UP (ref 150–400)
POTASSIUM SERPL-MCNC: 4 MMOL/L — SIGNIFICANT CHANGE UP (ref 3.5–5.3)
POTASSIUM SERPL-SCNC: 4 MMOL/L — SIGNIFICANT CHANGE UP (ref 3.5–5.3)
PROT SERPL-MCNC: 5.6 G/DL — LOW (ref 6–8.3)
PROTHROM AB SERPL-ACNC: 24 SEC — HIGH (ref 10.6–13.6)
RBC # BLD: 2.72 M/UL — LOW (ref 4.2–5.8)
RBC # FLD: 15.1 % — HIGH (ref 10.3–14.5)
SARS-COV-2 RNA SPEC QL NAA+PROBE: SIGNIFICANT CHANGE UP
SODIUM SERPL-SCNC: 135 MMOL/L — SIGNIFICANT CHANGE UP (ref 135–145)
WBC # BLD: 7.46 K/UL — SIGNIFICANT CHANGE UP (ref 3.8–10.5)
WBC # FLD AUTO: 7.46 K/UL — SIGNIFICANT CHANGE UP (ref 3.8–10.5)

## 2021-09-12 PROCEDURE — 71045 X-RAY EXAM CHEST 1 VIEW: CPT | Mod: 26

## 2021-09-12 RX ORDER — WARFARIN SODIUM 2.5 MG/1
5 TABLET ORAL ONCE
Refills: 0 | Status: COMPLETED | OUTPATIENT
Start: 2021-09-12 | End: 2021-09-12

## 2021-09-12 RX ORDER — MAGNESIUM SULFATE 500 MG/ML
2 VIAL (ML) INJECTION ONCE
Refills: 0 | Status: COMPLETED | OUTPATIENT
Start: 2021-09-12 | End: 2021-09-12

## 2021-09-12 RX ADMIN — CILOSTAZOL 100 MILLIGRAM(S): 100 TABLET ORAL at 05:52

## 2021-09-12 RX ADMIN — PANTOPRAZOLE SODIUM 40 MILLIGRAM(S): 20 TABLET, DELAYED RELEASE ORAL at 05:51

## 2021-09-12 RX ADMIN — Medication 650 MILLIGRAM(S): at 19:20

## 2021-09-12 RX ADMIN — Medication 1 MILLIGRAM(S): at 13:32

## 2021-09-12 RX ADMIN — Medication 500 MILLIGRAM(S): at 05:51

## 2021-09-12 RX ADMIN — ESCITALOPRAM OXALATE 5 MILLIGRAM(S): 10 TABLET, FILM COATED ORAL at 13:32

## 2021-09-12 RX ADMIN — Medication 325 MILLIGRAM(S): at 13:33

## 2021-09-12 RX ADMIN — Medication 40 MILLIGRAM(S): at 05:51

## 2021-09-12 RX ADMIN — Medication 137 MICROGRAM(S): at 05:51

## 2021-09-12 RX ADMIN — Medication 650 MILLIGRAM(S): at 19:50

## 2021-09-12 RX ADMIN — HEPARIN SODIUM 5000 UNIT(S): 5000 INJECTION INTRAVENOUS; SUBCUTANEOUS at 05:52

## 2021-09-12 RX ADMIN — ATORVASTATIN CALCIUM 40 MILLIGRAM(S): 80 TABLET, FILM COATED ORAL at 21:24

## 2021-09-12 RX ADMIN — CILOSTAZOL 100 MILLIGRAM(S): 100 TABLET ORAL at 17:15

## 2021-09-12 RX ADMIN — SPIRONOLACTONE 25 MILLIGRAM(S): 25 TABLET, FILM COATED ORAL at 17:14

## 2021-09-12 RX ADMIN — Medication 1 APPLICATION(S): at 17:20

## 2021-09-12 RX ADMIN — Medication 20 MILLIGRAM(S): at 10:59

## 2021-09-12 RX ADMIN — WARFARIN SODIUM 5 MILLIGRAM(S): 2.5 TABLET ORAL at 21:24

## 2021-09-12 RX ADMIN — Medication 1 APPLICATION(S): at 13:38

## 2021-09-12 RX ADMIN — Medication 100 MILLIGRAM(S): at 05:51

## 2021-09-12 RX ADMIN — Medication 500 MILLIGRAM(S): at 17:14

## 2021-09-12 RX ADMIN — Medication 50 GRAM(S): at 10:43

## 2021-09-12 RX ADMIN — Medication 1 APPLICATION(S): at 05:52

## 2021-09-12 RX ADMIN — Medication 81 MILLIGRAM(S): at 13:32

## 2021-09-12 RX ADMIN — SPIRONOLACTONE 25 MILLIGRAM(S): 25 TABLET, FILM COATED ORAL at 05:51

## 2021-09-12 RX ADMIN — CHLORHEXIDINE GLUCONATE 1 APPLICATION(S): 213 SOLUTION TOPICAL at 13:28

## 2021-09-12 NOTE — PROGRESS NOTE ADULT - SUBJECTIVE AND OBJECTIVE BOX
Subjective: Pt states "Hello" denies any CP or SOB. No acute events overnight.     Telemetry:  Afib 60 - 80s  Vital Signs Last 24 Hrs  T(C): 36.9 (21 @ 11:03), Max: 37 (21 @ 05:00)  T(F): 98.4 (21 @ 11:03), Max: 98.6 (21 @ 05:00)  HR: 66 (21 @ 11:03) (66 - 86)  BP: 98/61 (21 @ 11:03) (98/61 - 109/62)  RR: 18 (21 @ 11:03) (18 - 18)  SpO2: 95% (21 @ 11:03) (92% - 95%)              @ 07:01  -   @ 07:00  --------------------------------------------------------  IN: 990 mL / OUT: 1250 mL / NET: -260 mL        Daily Weight in k (12 Sep 2021 09:00)                        8.0    7.46  )-----------( 215      ( 12 Sep 2021 06:01 )             24.8     135  |  101  |  23  ----------------------------<  114<H>  4.0   |  21<L>  |  0.96    AST  13  /  ALT  10  /  AlkPhos  95            PHYSICAL EXAM  Neurology: A&Ox3, NAD  CV : RRR+S1S2  Sternal Wound: MSI CDI RAQUEL, Stable  Lungs: Respirations non-labored, B/L BS clear, diminished at bases  Abdomen: Soft, NT/ND, +BSx4Q, last BM   (-)N/V/D  : Voiding without difficulty  Extremities: B/L LE +1 edema, negative calf tenderness, +PP, RLE SVG incision CDI           MEDICATIONS  acetaminophen   Tablet .. 650 milliGRAM(s) Oral every 6 hours PRN  ascorbic acid 500 milliGRAM(s) Oral two times a day  aspirin  chewable 81 milliGRAM(s) Oral daily  atorvastatin 40 milliGRAM(s) Oral at bedtime  chlorhexidine 2% Cloths 1 Application(s) Topical <User Schedule>  cilostazol 100 milliGRAM(s) Oral every 12 hours  clotrimazole 1% Cream 1 Application(s) Topical two times a day  escitalopram 5 milliGRAM(s) Oral daily  ferrous    sulfate 325 milliGRAM(s) Oral daily  folic acid 1 milliGRAM(s) Oral daily  hydrocortisone 1% Cream 1 Application(s) Topical daily  levothyroxine 137 MICROGram(s) Oral daily  metoprolol succinate  milliGRAM(s) Oral daily  pantoprazole    Tablet 40 milliGRAM(s) Oral before breakfast  polyethylene glycol 3350 17 Gram(s) Oral daily  senna 2 Tablet(s) Oral at bedtime  spironolactone 25 milliGRAM(s) Oral every 12 hours  torsemide 20 milliGRAM(s) Oral daily  warfarin 5 milliGRAM(s) Oral once      Physical Therapy Rec:   Home  [  ]   Home w/ PT  [  ]  Rehab  [ X ]    Discussed with Cardiothoracic Team at AM rounds.

## 2021-09-12 NOTE — PROGRESS NOTE ADULT - SUBJECTIVE AND OBJECTIVE BOX
CARDIOLOGY     PROGRESS  NOTE   ________________________________________________    CHIEF COMPLAINT:Patient is a 76y old  Male who presents with a chief complaint of sob (11 Sep 2021 10:10)  no complain.  	  REVIEW OF SYSTEMS:  CONSTITUTIONAL: No fever, weight loss, or fatigue  EYES: No eye pain, visual disturbances, or discharge  ENT:  No difficulty hearing, tinnitus, vertigo; No sinus or throat pain  NECK: No pain or stiffness  RESPIRATORY: No cough, wheezing, chills or hemoptysis; No Shortness of Breath  CARDIOVASCULAR: No chest pain, palpitations, passing out, dizziness, or leg swelling  GASTROINTESTINAL: No abdominal or epigastric pain. No nausea, vomiting, or hematemesis; No diarrhea or constipation. No melena or hematochezia.  GENITOURINARY: No dysuria, frequency, hematuria, or incontinence  NEUROLOGICAL: No headaches, memory loss, loss of strength, numbness, or tremors  SKIN: No itching, burning, rashes, or lesions   LYMPH Nodes: No enlarged glands  ENDOCRINE: No heat or cold intolerance; No hair loss  MUSCULOSKELETAL: No joint pain or swelling; No muscle, back, or extremity pain  PSYCHIATRIC: No depression, anxiety, mood swings, or difficulty sleeping  HEME/LYMPH: No easy bruising, or bleeding gums  ALLERGY AND IMMUNOLOGIC: No hives or eczema	    [ ] All others negative	  [ ] Unable to obtain    PHYSICAL EXAM:  T(C): 37 (09-12-21 @ 05:00), Max: 37 (09-12-21 @ 05:00)  HR: 69 (09-12-21 @ 05:00) (68 - 86)  BP: 109/62 (09-12-21 @ 05:00) (85/56 - 109/62)  RR: 18 (09-12-21 @ 05:00) (18 - 18)  SpO2: 93% (09-12-21 @ 05:00) (92% - 97%)  Wt(kg): --  I&O's Summary    11 Sep 2021 07:01  -  12 Sep 2021 07:00  --------------------------------------------------------  IN: 990 mL / OUT: 1250 mL / NET: -260 mL        Appearance: Normal	  HEENT:   Normal oral mucosa, PERRL, EOMI	  Lymphatic: No lymphadenopathy  Cardiovascular: Normal S1 S2, No JVD, + murmurs, No edema  Respiratory: Lungs clear to auscultation	  Psychiatry: A & O x 3, Mood & affect appropriate  Gastrointestinal:  Soft, Non-tender, + BS	  Skin: No rashes, No ecchymoses, No cyanosis	  Neurologic: Non-focal  Extremities: Normal range of motion, No clubbing, cyanosis or edema  Vascular: Peripheral pulses palpable 2+ bilaterally    MEDICATIONS  (STANDING):  ascorbic acid 500 milliGRAM(s) Oral two times a day  aspirin  chewable 81 milliGRAM(s) Oral daily  atorvastatin 40 milliGRAM(s) Oral at bedtime  chlorhexidine 2% Cloths 1 Application(s) Topical <User Schedule>  cilostazol 100 milliGRAM(s) Oral every 12 hours  clotrimazole 1% Cream 1 Application(s) Topical two times a day  escitalopram 5 milliGRAM(s) Oral daily  ferrous    sulfate 325 milliGRAM(s) Oral daily  folic acid 1 milliGRAM(s) Oral daily  furosemide    Tablet 40 milliGRAM(s) Oral daily  heparin   Injectable 5000 Unit(s) SubCutaneous every 8 hours  hydrocortisone 1% Cream 1 Application(s) Topical daily  levothyroxine 137 MICROGram(s) Oral daily  metoprolol succinate  milliGRAM(s) Oral daily  pantoprazole    Tablet 40 milliGRAM(s) Oral before breakfast  polyethylene glycol 3350 17 Gram(s) Oral daily  senna 2 Tablet(s) Oral at bedtime  spironolactone 25 milliGRAM(s) Oral every 12 hours      TELEMETRY: 	    ECG:  	  RADIOLOGY:  OTHER: 	  	  LABS:	 	    CARDIAC MARKERS:                                8.0    7.46  )-----------( 215      ( 12 Sep 2021 06:01 )             24.8     09-12    135  |  101  |  23  ----------------------------<  114<H>  4.0   |  21<L>  |  0.96    Ca    8.7      12 Sep 2021 06:01  Phos  2.8     09-12  Mg     1.6     09-12    TPro  5.6<L>  /  Alb  2.9<L>  /  TBili  0.7  /  DBili  x   /  AST  13  /  ALT  10  /  AlkPhos  95  09-12    proBNP: Serum Pro-Brain Natriuretic Peptide: 1881 pg/mL (08-18 @ 16:10)    Lipid Profile:   HgA1c:   TSH: Thyroid Stimulating Hormone, Serum: 5.02 uIU/mL (08-25 @ 22:25)    PT/INR - ( 12 Sep 2021 06:01 )   PT: 24.0 sec;   INR: 2.07 ratio         PTT - ( 10 Sep 2021 11:36 )  PTT:33.1 sec      Assessment and plan  ---------------------------  s/p CABG 2 vessels and MVR/ bio  physical therapy  pt with hx of PAF, now in a.fib ?amio load will consider possible cardioversion latter on  doing well, post ppm battery change  ac with coumadin, fu inr  continue diuretics  ct was reviewed by ct surgery no intervention for retrosternal hematoma  fu hgb  increase ambultion  ppm site clean and dry  chest x ray noted with  mod  l pleural effusion, continue diuresis, if not resolves need to be tapped  continue current meds

## 2021-09-12 NOTE — PROGRESS NOTE ADULT - ASSESSMENT
75 yo  h/o PPM , CAD.  LYMPHEDEMA. HTN, MR,  a.fib on no ac secondary to frequent falls, PVD on pletal presents with SOB. Cardiac workup demonstrates severe MR and multivessel disease.    On 21, pt underwent MVR-t/CABG x 2 w/ LIMA  intraop & post-op bleeding w/ multiple products given ? TRALI-hypoxic- lasix gtt-     EP increased back up rate to 60 - .    gtt - rapid afib -  d/c'd, bb   lasix gtt d/c'd - EXTUBATED.    SDU - BB Increased.  -battery end-of-life - will change battery on  if INR < 1.8 per DR. Christensen   VSS afib 90s  - low dose coumadin - NPO for generator change PPM w/ DR. Christensen.  ck inr in am, d/c plan    Generator change today. Change to toprol xl 100, Ambulation, Anticoagulation    s/p ppm generator change yesterday, dressing cdi. Pt with LLL consolidation, collapse vs effusion, ct non con today, d/c pw   CT stble, reviewed with Dr grider, no further intervention.  Started on diuretics Transfer to floor. Increase PT pw out  9/10 Continue with diuresis, remains +2Kg above preop weight. Anticoagulation for afib and MVR. Pending rehab placement. Chest PT for LLL atelectasis   VSS, INR 1.85 Coumadin 5mg. Aldactone 25 BID added for edema. Plan for discharge to Rehab Monday.    VSS, INR 2.07, Coumadin 5mg. Pt still edematous, lasix changed to torsemide 20mg daily. COVID negative. CXR: Right lung clear, persistent moderate left pleural effusion.

## 2021-09-12 NOTE — PROGRESS NOTE ADULT - PROBLEM SELECTOR PLAN 1
s/p MVR 8/30  Continue anticoagulation on coumadin   daily PT/INR  Continue Toprol  daily  Continue diuresis on torsemide 20 daily and aldactone 25 BID  Strict I & Os, daily weights  Cough and deep breathe, Incentive Spirometry Q1h, Chest PT.  Ambulate 4x daily as tolerated and with PT.  Disposition: Subacute Rehab next week

## 2021-09-13 DIAGNOSIS — Z95.2 PRESENCE OF PROSTHETIC HEART VALVE: ICD-10-CM

## 2021-09-13 LAB
ANION GAP SERPL CALC-SCNC: 15 MMOL/L — SIGNIFICANT CHANGE UP (ref 5–17)
BUN SERPL-MCNC: 26 MG/DL — HIGH (ref 7–23)
CALCIUM SERPL-MCNC: 9 MG/DL — SIGNIFICANT CHANGE UP (ref 8.4–10.5)
CHLORIDE SERPL-SCNC: 99 MMOL/L — SIGNIFICANT CHANGE UP (ref 96–108)
CO2 SERPL-SCNC: 21 MMOL/L — LOW (ref 22–31)
CREAT SERPL-MCNC: 1.21 MG/DL — SIGNIFICANT CHANGE UP (ref 0.5–1.3)
GLUCOSE SERPL-MCNC: 114 MG/DL — HIGH (ref 70–99)
HCT VFR BLD CALC: 25.2 % — LOW (ref 39–50)
HGB BLD-MCNC: 8.1 G/DL — LOW (ref 13–17)
INR BLD: 2.22 RATIO — HIGH (ref 0.88–1.16)
MAGNESIUM SERPL-MCNC: 1.9 MG/DL — SIGNIFICANT CHANGE UP (ref 1.6–2.6)
MCHC RBC-ENTMCNC: 29.2 PG — SIGNIFICANT CHANGE UP (ref 27–34)
MCHC RBC-ENTMCNC: 32.1 GM/DL — SIGNIFICANT CHANGE UP (ref 32–36)
MCV RBC AUTO: 91 FL — SIGNIFICANT CHANGE UP (ref 80–100)
NRBC # BLD: 0 /100 WBCS — SIGNIFICANT CHANGE UP (ref 0–0)
PHOSPHATE SERPL-MCNC: 3.9 MG/DL — SIGNIFICANT CHANGE UP (ref 2.5–4.5)
PLATELET # BLD AUTO: 232 K/UL — SIGNIFICANT CHANGE UP (ref 150–400)
POTASSIUM SERPL-MCNC: 3.6 MMOL/L — SIGNIFICANT CHANGE UP (ref 3.5–5.3)
POTASSIUM SERPL-SCNC: 3.6 MMOL/L — SIGNIFICANT CHANGE UP (ref 3.5–5.3)
PROTHROM AB SERPL-ACNC: 25.7 SEC — HIGH (ref 10.6–13.6)
RBC # BLD: 2.77 M/UL — LOW (ref 4.2–5.8)
RBC # FLD: 15.2 % — HIGH (ref 10.3–14.5)
SODIUM SERPL-SCNC: 135 MMOL/L — SIGNIFICANT CHANGE UP (ref 135–145)
WBC # BLD: 6.84 K/UL — SIGNIFICANT CHANGE UP (ref 3.8–10.5)
WBC # FLD AUTO: 6.84 K/UL — SIGNIFICANT CHANGE UP (ref 3.8–10.5)

## 2021-09-13 PROCEDURE — 71045 X-RAY EXAM CHEST 1 VIEW: CPT | Mod: 26

## 2021-09-13 RX ORDER — POTASSIUM BICARBONATE 978 MG/1
25 TABLET, EFFERVESCENT ORAL
Refills: 0 | Status: COMPLETED | OUTPATIENT
Start: 2021-09-13 | End: 2021-09-13

## 2021-09-13 RX ORDER — WARFARIN SODIUM 2.5 MG/1
4 TABLET ORAL ONCE
Refills: 0 | Status: COMPLETED | OUTPATIENT
Start: 2021-09-13 | End: 2021-09-13

## 2021-09-13 RX ADMIN — Medication 1 APPLICATION(S): at 06:04

## 2021-09-13 RX ADMIN — Medication 325 MILLIGRAM(S): at 10:25

## 2021-09-13 RX ADMIN — PANTOPRAZOLE SODIUM 40 MILLIGRAM(S): 20 TABLET, DELAYED RELEASE ORAL at 06:03

## 2021-09-13 RX ADMIN — POTASSIUM BICARBONATE 25 MILLIEQUIVALENT(S): 978 TABLET, EFFERVESCENT ORAL at 09:22

## 2021-09-13 RX ADMIN — CHLORHEXIDINE GLUCONATE 1 APPLICATION(S): 213 SOLUTION TOPICAL at 09:19

## 2021-09-13 RX ADMIN — POTASSIUM BICARBONATE 25 MILLIEQUIVALENT(S): 978 TABLET, EFFERVESCENT ORAL at 10:25

## 2021-09-13 RX ADMIN — Medication 500 MILLIGRAM(S): at 17:11

## 2021-09-13 RX ADMIN — SPIRONOLACTONE 25 MILLIGRAM(S): 25 TABLET, FILM COATED ORAL at 17:11

## 2021-09-13 RX ADMIN — CILOSTAZOL 100 MILLIGRAM(S): 100 TABLET ORAL at 06:04

## 2021-09-13 RX ADMIN — Medication 20 MILLIGRAM(S): at 06:03

## 2021-09-13 RX ADMIN — Medication 137 MICROGRAM(S): at 06:04

## 2021-09-13 RX ADMIN — Medication 1 MILLIGRAM(S): at 10:25

## 2021-09-13 RX ADMIN — Medication 500 MILLIGRAM(S): at 06:03

## 2021-09-13 RX ADMIN — Medication 1 APPLICATION(S): at 10:27

## 2021-09-13 RX ADMIN — ESCITALOPRAM OXALATE 5 MILLIGRAM(S): 10 TABLET, FILM COATED ORAL at 10:25

## 2021-09-13 RX ADMIN — WARFARIN SODIUM 4 MILLIGRAM(S): 2.5 TABLET ORAL at 21:04

## 2021-09-13 RX ADMIN — Medication 100 MILLIGRAM(S): at 06:04

## 2021-09-13 RX ADMIN — Medication 1 APPLICATION(S): at 17:10

## 2021-09-13 RX ADMIN — Medication 81 MILLIGRAM(S): at 10:25

## 2021-09-13 RX ADMIN — SPIRONOLACTONE 25 MILLIGRAM(S): 25 TABLET, FILM COATED ORAL at 06:03

## 2021-09-13 RX ADMIN — ATORVASTATIN CALCIUM 40 MILLIGRAM(S): 80 TABLET, FILM COATED ORAL at 21:04

## 2021-09-13 NOTE — PROGRESS NOTE ADULT - PROBLEM SELECTOR PLAN 1
s/p MVR 8/30  Continue anticoagulation on coumadin   daily PT/INR  Continue Toprol  daily  Continue diuresis on torsemide 20 daily and aldactone 25 BID  Strict I & Os, daily weights  Cough and deep breathe, Incentive Spirometry Q1h, Chest PT.  Ambulate 4x daily as tolerated and with PT.  Disposition: Subacute Rehab next week [facility] PPM  s/p generator change with Dr. Christensen

## 2021-09-13 NOTE — PROGRESS NOTE ADULT - ASSESSMENT
75 yo  h/o PPM , CAD.  LYMPHEDEMA. HTN, MR,  a.fib on no ac secondary to frequent falls, PVD on pletal presents with SOB. Cardiac workup demonstrates severe MR and multivessel disease.    On 21, pt underwent MVR-t/CABG x 2 w/ LIMA  intraop & post-op bleeding w/ multiple products given ? TRALI-hypoxic- lasix gtt-     EP increased back up rate to 60 - .    gtt - rapid afib -  d/c'd, bb   lasix gtt d/c'd - EXTUBATED.    SDU - BB Increased.  -battery end-of-life - will change battery on  if INR < 1.8 per DR. Christensen   VSS afib 90s  - low dose coumadin - NPO for generator change PPM w/ DR. Christensen.  ck inr in am, d/c plan    Generator change today. Change to toprol xl 100, Ambulation, Anticoagulation    s/p ppm generator change yesterday, dressing cdi. Pt with LLL consolidation, collapse vs effusion, ct non con today, d/c pw   CT stble, reviewed with Dr grider, no further intervention.  Started on diuretics Transfer to floor. Increase PT pw out  9/10 Continue with diuresis, remains +2Kg above preop weight. Anticoagulation for afib and MVR. Pending rehab placement. Chest PT for LLL atelectasis   VSS, INR 1.85 Coumadin 5mg. Aldactone 25 BID added for edema. Plan for discharge to Rehab Monday.    VSS, INR 2.07, Coumadin 5mg. Pt still edematous, lasix changed to torsemide 20mg daily. COVID negative. CXR: Right lung clear, persistent moderate left pleural effusion.     75 yo  h/o PPM , CAD.  LYMPHEDEMA. HTN, MR,  a.fib on no ac secondary to frequent falls, PVD on pletal presents with SOB. Cardiac workup demonstrates severe MR and multivessel disease.    On 21, pt underwent MVR-t/CABG x 2 w/ LIMA  intraop & post-op bleeding w/ multiple products given ? TRALI-hypoxic- lasix gtt-     EP increased back up rate to 60 - .    gtt - rapid afib -  d/c'd, bb   lasix gtt d/c'd - EXTUBATED.    SDU - BB Increased.  -battery end-of-life - will change battery on  if INR < 1.8 per DR. Christensen   VSS afib 90s  - low dose coumadin - NPO for generator change PPM w/ DR. Christensen.  ck inr in am, d/c plan    Generator change today. Change to toprol xl 100, Ambulation, Anticoagulation    s/p ppm generator change yesterday, dressing cdi. Pt with LLL consolidation, collapse vs effusion, ct non con today, d/c pw   CT stble, reviewed with Dr grider, no further intervention.  Started on diuretics Transfer to floor. Increase PT pw out  9/10 Continue with diuresis, remains +2Kg above preop weight. Anticoagulation for afib and MVR. Pending rehab placement. Chest PT for LLL atelectasis   VSS, INR 1.85 Coumadin 5mg. Aldactone 25 BID added for edema. Plan for discharge to Rehab Monday.    VSS, INR 2.07, Coumadin 5mg. Pt still edematous, lasix changed to torsemide 20mg daily. COVID negative. CXR: Right lung clear, persistent moderate left pleural effusion.   VSS; continue diuresis/ ace wrap LE pulm toilet; repeat chest xray in am; increase activity as tolerated; INR 2.2- coumadin 4 mg this evening  rehab when stable ? wed; ck covid tue

## 2021-09-13 NOTE — PROGRESS NOTE ADULT - PROBLEM SELECTOR PLAN 2
medtr[employer] PPM  s/p generator change tuesday 9/7 with Dr. Christensen r toe wound - podiatry following

## 2021-09-13 NOTE — PROGRESS NOTE ADULT - PROBLEM SELECTOR PLAN 5
Continue asa81 daily, ToprolXL 100 daily, atorvastatin 40 HS  Titrate up beta-blocker as tolerated continue postop care  continue asa/  statin/ toprol 100 qd  diuresis   pulm toilet   ck chest xray in am  anticoagulation with coumadin/ daily pt/ inr   pain management   increase activity as tolerated  Discharge planning- rehab wed

## 2021-09-13 NOTE — PROGRESS NOTE ADULT - PROBLEM SELECTOR PLAN 4
Continue synthroid 137mcg daily continue postop care  continue asa/  statin/ toprol 100 qd  diuresis / ACE wrap LE   pulm toilet   ck chest xray in am  anticoagulation with coumadin/ daily pt/ inr   pain management   increase activity as tolerated  Discharge planning- rehab wed

## 2021-09-13 NOTE — PROGRESS NOTE ADULT - SUBJECTIVE AND OBJECTIVE BOX
VITAL SIGNS    Telemetry:    Vital Signs Last 24 Hrs  T(C): 36.6 (21 @ 05:00), Max: 36.9 (21 @ 10:54)  T(F): 97.9 (21 @ 05:00), Max: 98.4 (21 @ 10:54)  HR: 82 (21 @ 05:00) (66 - 85)  BP: 100/60 (21 @ 05:00) (97/57 - 100/60)  RR: 18 (21 @ 05:00) (18 - 18)  SpO2: 95% (21 @ 10:30) (88% - 98%)             @ 07:01  -   @ 07:00  --------------------------------------------------------  IN: 820 mL / OUT: 2475 mL / NET: -1655 mL     @ 07:01  -   @ 10:41  --------------------------------------------------------  IN: 360 mL / OUT: 1000 mL / NET: -640 mL       Daily     Daily Weight in k.9 (13 Sep 2021 08:20)  Admit Wt: Drug Dosing Weight  Height (cm): 170.2 (02 Sep 2021 20:29)  Weight (kg): 96.2 (30 Aug 2021 13:07)  BMI (kg/m2): 33.2 (02 Sep 2021 20:29)  BSA (m2): 2.07 (02 Sep 2021 20:29)      CAPILLARY BLOOD GLUCOSE              acetaminophen   Tablet .. 650 milliGRAM(s) Oral every 6 hours PRN  ascorbic acid 500 milliGRAM(s) Oral two times a day  aspirin  chewable 81 milliGRAM(s) Oral daily  atorvastatin 40 milliGRAM(s) Oral at bedtime  chlorhexidine 2% Cloths 1 Application(s) Topical <User Schedule>  clotrimazole 1% Cream 1 Application(s) Topical two times a day  escitalopram 5 milliGRAM(s) Oral daily  ferrous    sulfate 325 milliGRAM(s) Oral daily  folic acid 1 milliGRAM(s) Oral daily  hydrocortisone 1% Cream 1 Application(s) Topical daily  levothyroxine 137 MICROGram(s) Oral daily  metoprolol succinate  milliGRAM(s) Oral daily  pantoprazole    Tablet 40 milliGRAM(s) Oral before breakfast  polyethylene glycol 3350 17 Gram(s) Oral daily  senna 2 Tablet(s) Oral at bedtime  spironolactone 25 milliGRAM(s) Oral every 12 hours  torsemide 20 milliGRAM(s) Oral daily      PHYSICAL EXAM    Subjective: "Hi.   Neurology: alert and oriented x 3, nonfocal, no gross deficits  CV : tele:  RSR  Sternal Wound :  CDI with dressing , Stable  Lungs: clear. RR easy, unlabored   Abdomen: soft, nontender, nondistended, positive bowel sounds, bowel movement   Neg N/V/D   :  pt voiding without difficulty   Extremities:   HOLMAN; edema, neg calf tenderness.   PPP bilaterally      PW:  Chest tubes:                 VITAL SIGNS    Telemetry:  afib    Vital Signs Last 24 Hrs  T(C): 36.6 (21 @ 05:00), Max: 36.9 (21 @ 10:54)  T(F): 97.9 (21 @ 05:00), Max: 98.4 (21 @ 10:54)  HR: 82 (21 @ 05:00) (66 - 85)  BP: 100/60 (21 @ 05:00) (97/57 - 100/60)  RR: 18 (21 @ 05:00) (18 - 18)  SpO2: 95% (21 @ 10:30) (88% - 98%)             @ 07:01  -   @ 07:00  --------------------------------------------------------  IN: 820 mL / OUT: 2475 mL / NET: -1655 mL     @ 07:01  -   @ 10:41  --------------------------------------------------------  IN: 360 mL / OUT: 1000 mL / NET: -640 mL       Daily     Daily Weight in k.9 (13 Sep 2021 08:20)  Admit Wt: Drug Dosing Weight  Height (cm): 170.2 (02 Sep 2021 20:29)  Weight (kg): 96.2 (30 Aug 2021 13:07)  BMI (kg/m2): 33.2 (02 Sep 2021 20:29)  BSA (m2): 2.07 (02 Sep 2021 20:29)         acetaminophen   Tablet .. 650 milliGRAM(s) Oral every 6 hours PRN  ascorbic acid 500 milliGRAM(s) Oral two times a day  aspirin  chewable 81 milliGRAM(s) Oral daily  atorvastatin 40 milliGRAM(s) Oral at bedtime  chlorhexidine 2% Cloths 1 Application(s) Topical <User Schedule>  clotrimazole 1% Cream 1 Application(s) Topical two times a day  escitalopram 5 milliGRAM(s) Oral daily  ferrous    sulfate 325 milliGRAM(s) Oral daily  folic acid 1 milliGRAM(s) Oral daily  hydrocortisone 1% Cream 1 Application(s) Topical daily  levothyroxine 137 MICROGram(s) Oral daily  metoprolol succinate  milliGRAM(s) Oral daily  pantoprazole    Tablet 40 milliGRAM(s) Oral before breakfast  polyethylene glycol 3350 17 Gram(s) Oral daily  senna 2 Tablet(s) Oral at bedtime  spironolactone 25 milliGRAM(s) Oral every 12 hours  torsemide 20 milliGRAM(s) Oral daily      PHYSICAL EXAM    Subjective: "I just walked."   Neurology: alert and oriented x 3, nonfocal, no gross deficits  CV : tele:  afib    PPM site cdi oren with SS   Sternal Wound :  CDI OREN- sternum stable   Lungs: clear diminished at bases. RR easy, unlabored   Abdomen: soft, nontender, nondistended, positive bowel sounds, + bowel movement   Neg N/V/D   :  pt voiding without difficulty   Extremities:   HOLMAN; +2 LE edema, neg calf tenderness.   PPP bilaterally      PW: no  Chest tubes: none

## 2021-09-13 NOTE — PROGRESS NOTE ADULT - ASSESSMENT
76  year old male          h/o   , CAD s/p stent on ASA, A-fib/ PPM, , hypothyroid, and total left knee replacement       s/p rash across the upper torso/ back  for past week, pruritic on R arm, and rash in the groin      prior  PPM  interrogation  with  [facility]/   s/p  brief   bursts  of  afib/ , on prior visit          admitted with sob,  from acute  diastolic  chf, related  to non compliance  with his  lasix   wound care/  lymphedema,  carla left  leg   *  CAD/ AFIb  /PPM,/ HTN   , stent in 2007   *  h/o  AFIB,  was  not  on  a/c    * Anemia, , hb stable, had  been seen by  gi  eval dr joann sanchez in past    right leg with distal redness.  not cellulitis / c/c  for past few  months  an d left leg lymphedema     xray spine. horse  shoe left kidney/ OA  of  spine  on    lasix,  ha  improved    * Echo, normal  ef/ severe MR and,  ERENDIRA ,  severe  MR    cath, with 2 vessel disease,  s/p  CABG and  MVR  surg d r marni   h/o  Afib on  coumadin  on asa.  lipitor, torpol,  synthroid. toresmide  hb is 7/  24. Ct chest, retrosternal hematoma.  mod left pl effusion   CT surg note  seen,  per   surgeon   ok to  continue  coumadin/  for  afib  follow  daily inr/  pt ha s been ambulating           f/p wih  dr mikayla dick   ra from: CT Chest No Cont (09.08.21 @ 08:32) >  IMPRESSION:  Left pleural effusion with near complete left lower lobe atelectasis.  6.7 x 2.8 cm retrosternal hematoma. No comment can be made about active extravasation on this noncontrast exam.  These findings were discussed with NP FRANCISCO MCGEE on 9/8/2021 at 2:18 PM by Dr. Morfin with read back confirmation.  --- End of Report ---  < end of copied text >

## 2021-09-13 NOTE — PROGRESS NOTE ADULT - SUBJECTIVE AND OBJECTIVE BOX
afberile    REVIEW OF SYSTEMS:  GEN: no fever,    no chills  RESP: no SOB,   no cough  CVS: no chest pain,   no palpitations  GI: no abdominal pain,   no nausea,   no vomiting,   no constipation,   no diarrhea  : no dysuria,   no frequency  NEURO: no headache,   no dizziness  PSYCH: no depression,   not anxious  Derm : no rash    MEDICATIONS  (STANDING):  ascorbic acid 500 milliGRAM(s) Oral two times a day  aspirin  chewable 81 milliGRAM(s) Oral daily  atorvastatin 40 milliGRAM(s) Oral at bedtime  chlorhexidine 2% Cloths 1 Application(s) Topical <User Schedule>  cilostazol 100 milliGRAM(s) Oral every 12 hours  clotrimazole 1% Cream 1 Application(s) Topical two times a day  escitalopram 5 milliGRAM(s) Oral daily  ferrous    sulfate 325 milliGRAM(s) Oral daily  folic acid 1 milliGRAM(s) Oral daily  hydrocortisone 1% Cream 1 Application(s) Topical daily  levothyroxine 137 MICROGram(s) Oral daily  metoprolol succinate  milliGRAM(s) Oral daily  pantoprazole    Tablet 40 milliGRAM(s) Oral before breakfast  polyethylene glycol 3350 17 Gram(s) Oral daily  potassium bicarbonate/potassium citrate 25 milliEquivalent(s) Oral every 1 hour  senna 2 Tablet(s) Oral at bedtime  spironolactone 25 milliGRAM(s) Oral every 12 hours  torsemide 20 milliGRAM(s) Oral daily    MEDICATIONS  (PRN):  acetaminophen   Tablet .. 650 milliGRAM(s) Oral every 6 hours PRN Mild Pain (1 - 3)      Vital Signs Last 24 Hrs  T(C): 36.6 (13 Sep 2021 05:00), Max: 36.9 (12 Sep 2021 10:54)  T(F): 97.9 (13 Sep 2021 05:00), Max: 98.4 (12 Sep 2021 10:54)  HR: 82 (13 Sep 2021 05:00) (66 - 85)  BP: 100/60 (13 Sep 2021 05:00) (97/57 - 100/60)  BP(mean): 73 (13 Sep 2021 05:00) (73 - 73)  RR: 18 (13 Sep 2021 05:00) (18 - 18)  SpO2: 94% (13 Sep 2021 05:00) (93% - 95%)  CAPILLARY BLOOD GLUCOSE        I&O's Summary    12 Sep 2021 07:01  -  13 Sep 2021 07:00  --------------------------------------------------------  IN: 820 mL / OUT: 2475 mL / NET: -1655 mL    13 Sep 2021 07:01  -  13 Sep 2021 09:28  --------------------------------------------------------  IN: 360 mL / OUT: 600 mL / NET: -240 mL        PHYSICAL EXAM:  HEAD:  Atraumatic, Normocephalic  NECK: Supple, No   JVD  CHEST/LUNG:   no     rales,     no,    rhonchi  HEART: Regular rate and rhythm;         murmur  ABDOMEN: Soft, Nontender, ;   EXTREMITIES:    less    edema  NEUROLOGY:  alert    LABS:                        8.1    6.84  )-----------( 232      ( 13 Sep 2021 06:30 )             25.2     09-13    135  |  99  |  26<H>  ----------------------------<  114<H>  3.6   |  21<L>  |  1.21    Ca    9.0      13 Sep 2021 06:29  Phos  3.9     09-13  Mg     1.9     09-13    TPro  5.6<L>  /  Alb  2.9<L>  /  TBili  0.7  /  DBili  x   /  AST  13  /  ALT  10  /  AlkPhos  95  09-12    PT/INR - ( 13 Sep 2021 06:30 )   PT: 25.7 sec;   INR: 2.22 ratio                         Thyroid Stimulating Hormone, Serum: 5.02 uIU/mL (08-25 @ 22:25)          Consultant(s) Notes Reviewed:      Care Discussed with Consultants/Other Providers:

## 2021-09-13 NOTE — STUDENT SIGN OFF DOCUMENT - DOCUMENTS STUDENTS ARE SIGNED OFF ON
Plan of Care
Vital Signs/Plan of Care/Assessment and Intervention
Vital Signs/Plan of Care/Assessment and Intervention

## 2021-09-13 NOTE — PROGRESS NOTE ADULT - SUBJECTIVE AND OBJECTIVE BOX
INTERVAL HPI/OVERNIGHT EVENTS:  pt doing well, no complaints   no new events, hgb stable        MEDICATIONS  (PRN):      Allergies    alfuzosin (Hives)  levofloxacin (Hives)  Myrbetriq (Hives)  tamsulosin (Hives)    Intolerances      ROS   unable to obtain         PHYSICAL EXAM:   Vital Signs:  Vital Signs Last 24 Hrs  T(C): 37.1 (29 Aug 2021 04:21), Max: 37.1 (29 Aug 2021 04:21)  T(F): 98.7 (29 Aug 2021 04:21), Max: 98.7 (29 Aug 2021 04:21)  HR: 55 (29 Aug 2021 04:21) (55 - 78)  BP: 116/61 (29 Aug 2021 04:21) (116/61 - 146/63)  BP(mean): --  RR: 18 (29 Aug 2021 04:21) (18 - 18)  SpO2: 93% (29 Aug 2021 04:21) (93% - 96%)  Daily     Daily Weight in k.2 (29 Aug 2021 07:30)I&O's Summary    28 Aug 2021 07:  -  29 Aug 2021 07:00  --------------------------------------------------------  IN: 1110 mL / OUT: 5 mL / NET: -915 mL    29 Aug 2021 07:  -  29 Aug 2021 10:27  --------------------------------------------------------  IN: 240 mL / OUT: 800 mL / NET: -560 mL        GENERAL:  Appears stated age, well-groomed, well-nourished, no distress  HEENT:  NC/AT,  conjunctivae clear and pink, no thyromegaly, nodules, adenopathy, no JVD, sclera -anicteric  CHEST:  Full & symmetric excursion, no increased effort, breath sounds clear  HEART:  Regular rhythm, S1, S2, no murmur/rub/S3/S4, no abdominal bruit, no edema  ABDOMEN:  Soft, non-tender, non-distended, normoactive bowel sounds,  no masses ,no hepato-splenomegaly, no signs of chronic liver disease  EXTEREMITIES:  no cyanosis,clubbing or edema  SKIN:  No rash/erythema/ecchymoses/petechiae/wounds/abscess/warm/dry  NEURO:  Alert, oriented, no asterixis, no tremor, no encephalopathy      LABS:                        11.5   5.65  )-----------( 138      ( 29 Aug 2021 06:30 )             36.1         139  |  105  |  44<H>  ----------------------------<  113<H>  4.5   |  21<L>  |  1.06    Ca    9.8      29 Aug 2021 06:30          amylase   lipase  RADIOLOGY & ADDITIONAL TESTS:

## 2021-09-13 NOTE — PROGRESS NOTE ADULT - SUBJECTIVE AND OBJECTIVE BOX
CARDIOLOGY     PROGRESS  NOTE   ________________________________________________    CHIEF COMPLAINT:Patient is a 76y old  Male who presents with a chief complaint of sob (12 Sep 2021 15:16)  no complain.  	  REVIEW OF SYSTEMS:  CONSTITUTIONAL: No fever, weight loss, or fatigue  EYES: No eye pain, visual disturbances, or discharge  ENT:  No difficulty hearing, tinnitus, vertigo; No sinus or throat pain  NECK: No pain or stiffness  RESPIRATORY: No cough, wheezing, chills or hemoptysis; No Shortness of Breath  CARDIOVASCULAR: No chest pain, palpitations, passing out, dizziness, or leg swelling  GASTROINTESTINAL: No abdominal or epigastric pain. No nausea, vomiting, or hematemesis; No diarrhea or constipation. No melena or hematochezia.  GENITOURINARY: No dysuria, frequency, hematuria, or incontinence  NEUROLOGICAL: No headaches, memory loss, loss of strength, numbness, or tremors  SKIN: No itching, burning, rashes, or lesions   LYMPH Nodes: No enlarged glands  ENDOCRINE: No heat or cold intolerance; No hair loss  MUSCULOSKELETAL: No joint pain or swelling; No muscle, back, or extremity pain  PSYCHIATRIC: No depression, anxiety, mood swings, or difficulty sleeping  HEME/LYMPH: No easy bruising, or bleeding gums  ALLERGY AND IMMUNOLOGIC: No hives or eczema	    [ ] All others negative	  [ ] Unable to obtain    PHYSICAL EXAM:  T(C): 36.6 (09-13-21 @ 05:00), Max: 36.9 (09-12-21 @ 10:54)  HR: 82 (09-13-21 @ 05:00) (66 - 85)  BP: 100/60 (09-13-21 @ 05:00) (97/57 - 100/60)  RR: 18 (09-13-21 @ 05:00) (18 - 18)  SpO2: 94% (09-13-21 @ 05:00) (93% - 95%)  Wt(kg): --  I&O's Summary    12 Sep 2021 07:01  -  13 Sep 2021 07:00  --------------------------------------------------------  IN: 820 mL / OUT: 2475 mL / NET: -1655 mL        Appearance: Normal	  HEENT:   Normal oral mucosa, PERRL, EOMI	  Lymphatic: No lymphadenopathy  Cardiovascular: Normal S1 S2, No JVD, + murmurs, No edema  Respiratory: decrease bs l side  Psychiatry: A & O x 3, Mood & affect appropriate  Gastrointestinal:  Soft, Non-tender, + BS	  Skin: No rashes, No ecchymoses, No cyanosis	  Neurologic: Non-focal  Extremities: Normal range of motion, No clubbing, cyanosis or edema  Vascular: Peripheral pulses palpable 2+ bilaterally    MEDICATIONS  (STANDING):  ascorbic acid 500 milliGRAM(s) Oral two times a day  aspirin  chewable 81 milliGRAM(s) Oral daily  atorvastatin 40 milliGRAM(s) Oral at bedtime  chlorhexidine 2% Cloths 1 Application(s) Topical <User Schedule>  cilostazol 100 milliGRAM(s) Oral every 12 hours  clotrimazole 1% Cream 1 Application(s) Topical two times a day  escitalopram 5 milliGRAM(s) Oral daily  ferrous    sulfate 325 milliGRAM(s) Oral daily  folic acid 1 milliGRAM(s) Oral daily  hydrocortisone 1% Cream 1 Application(s) Topical daily  levothyroxine 137 MICROGram(s) Oral daily  metoprolol succinate  milliGRAM(s) Oral daily  pantoprazole    Tablet 40 milliGRAM(s) Oral before breakfast  polyethylene glycol 3350 17 Gram(s) Oral daily  senna 2 Tablet(s) Oral at bedtime  spironolactone 25 milliGRAM(s) Oral every 12 hours  torsemide 20 milliGRAM(s) Oral daily      TELEMETRY: 	    ECG:  	  RADIOLOGY:  OTHER: 	  	  LABS:	 	    CARDIAC MARKERS:                                8.1    6.84  )-----------( 232      ( 13 Sep 2021 06:30 )             25.2     09-13    135  |  99  |  26<H>  ----------------------------<  114<H>  3.6   |  21<L>  |  1.21    Ca    9.0      13 Sep 2021 06:29  Phos  3.9     09-13  Mg     1.9     09-13    TPro  5.6<L>  /  Alb  2.9<L>  /  TBili  0.7  /  DBili  x   /  AST  13  /  ALT  10  /  AlkPhos  95  09-12    proBNP: Serum Pro-Brain Natriuretic Peptide: 1881 pg/mL (08-18 @ 16:10)    Lipid Profile:   HgA1c:   TSH: Thyroid Stimulating Hormone, Serum: 5.02 uIU/mL (08-25 @ 22:25)    PT/INR - ( 13 Sep 2021 06:30 )   PT: 25.7 sec;   INR: 2.22 ratio         < from: Xray Chest 1 View- PORTABLE-Urgent (Xray Chest 1 View- PORTABLE-Urgent .) (09.12.21 @ 10:55) >  Stable cardiomegaly.  S/P sternotomy. Mitral valve ring and left atrial appendage clip.  Dual-lead left-sided pacemaker in situ.  Right lung is clear.  Persistent moderate-sized left pleural effusion with associated lower left lung atelectasis.  No pneumothorax.  Degenerative changes of the thoracic spine.        Assessment and plan  ---------------------------  s/p CABG 2 vessels and MVR/ bio  physical therapy  pt with hx of PAF, now in a.fib ?amio load will consider possible cardioversion latter on  doing well, post ppm battery change  ac with coumadin, fu inr  continue diuretics  ct was reviewed by ct surgery no intervention for retrosternal hematoma  fu hgb  increase ambulation  ppm site clean and dry  chest x ray noted with  mod  l pleural effusion, continue diuresis, if not resolves need to be tapped  continue current meds  lasix has changed to torsemide  inr is theraputic

## 2021-09-14 LAB
ANION GAP SERPL CALC-SCNC: 13 MMOL/L — SIGNIFICANT CHANGE UP (ref 5–17)
BUN SERPL-MCNC: 28 MG/DL — HIGH (ref 7–23)
CALCIUM SERPL-MCNC: 9.2 MG/DL — SIGNIFICANT CHANGE UP (ref 8.4–10.5)
CHLORIDE SERPL-SCNC: 99 MMOL/L — SIGNIFICANT CHANGE UP (ref 96–108)
CO2 SERPL-SCNC: 24 MMOL/L — SIGNIFICANT CHANGE UP (ref 22–31)
CREAT SERPL-MCNC: 1.16 MG/DL — SIGNIFICANT CHANGE UP (ref 0.5–1.3)
GLUCOSE SERPL-MCNC: 119 MG/DL — HIGH (ref 70–99)
HCT VFR BLD CALC: 25.7 % — LOW (ref 39–50)
HGB BLD-MCNC: 8.2 G/DL — LOW (ref 13–17)
INR BLD: 2.54 RATIO — HIGH (ref 0.88–1.16)
MCHC RBC-ENTMCNC: 29.4 PG — SIGNIFICANT CHANGE UP (ref 27–34)
MCHC RBC-ENTMCNC: 31.9 GM/DL — LOW (ref 32–36)
MCV RBC AUTO: 92.1 FL — SIGNIFICANT CHANGE UP (ref 80–100)
NRBC # BLD: 0 /100 WBCS — SIGNIFICANT CHANGE UP (ref 0–0)
PLATELET # BLD AUTO: 238 K/UL — SIGNIFICANT CHANGE UP (ref 150–400)
POTASSIUM SERPL-MCNC: 3.7 MMOL/L — SIGNIFICANT CHANGE UP (ref 3.5–5.3)
POTASSIUM SERPL-SCNC: 3.7 MMOL/L — SIGNIFICANT CHANGE UP (ref 3.5–5.3)
PROTHROM AB SERPL-ACNC: 29.2 SEC — HIGH (ref 10.6–13.6)
RBC # BLD: 2.79 M/UL — LOW (ref 4.2–5.8)
RBC # FLD: 15.1 % — HIGH (ref 10.3–14.5)
SARS-COV-2 RNA SPEC QL NAA+PROBE: SIGNIFICANT CHANGE UP
SODIUM SERPL-SCNC: 136 MMOL/L — SIGNIFICANT CHANGE UP (ref 135–145)
WBC # BLD: 6.94 K/UL — SIGNIFICANT CHANGE UP (ref 3.8–10.5)
WBC # FLD AUTO: 6.94 K/UL — SIGNIFICANT CHANGE UP (ref 3.8–10.5)

## 2021-09-14 PROCEDURE — 93308 TTE F-UP OR LMTD: CPT | Mod: 26

## 2021-09-14 PROCEDURE — 71045 X-RAY EXAM CHEST 1 VIEW: CPT | Mod: 26

## 2021-09-14 PROCEDURE — 93321 DOPPLER ECHO F-UP/LMTD STD: CPT | Mod: 26

## 2021-09-14 RX ORDER — WARFARIN SODIUM 2.5 MG/1
3 TABLET ORAL ONCE
Refills: 0 | Status: COMPLETED | OUTPATIENT
Start: 2021-09-14 | End: 2021-09-14

## 2021-09-14 RX ORDER — SORBITOL SOLUTION 70 %
30 SOLUTION, ORAL MISCELLANEOUS ONCE
Refills: 0 | Status: COMPLETED | OUTPATIENT
Start: 2021-09-14 | End: 2021-09-14

## 2021-09-14 RX ORDER — NYSTATIN CREAM 100000 [USP'U]/G
1 CREAM TOPICAL
Refills: 0 | Status: DISCONTINUED | OUTPATIENT
Start: 2021-09-14 | End: 2021-09-16

## 2021-09-14 RX ADMIN — PANTOPRAZOLE SODIUM 40 MILLIGRAM(S): 20 TABLET, DELAYED RELEASE ORAL at 05:26

## 2021-09-14 RX ADMIN — Medication 137 MICROGRAM(S): at 05:25

## 2021-09-14 RX ADMIN — Medication 500 MILLIGRAM(S): at 05:25

## 2021-09-14 RX ADMIN — SPIRONOLACTONE 25 MILLIGRAM(S): 25 TABLET, FILM COATED ORAL at 17:21

## 2021-09-14 RX ADMIN — Medication 81 MILLIGRAM(S): at 12:20

## 2021-09-14 RX ADMIN — Medication 1 APPLICATION(S): at 17:21

## 2021-09-14 RX ADMIN — Medication 30 MILLILITER(S): at 08:56

## 2021-09-14 RX ADMIN — Medication 325 MILLIGRAM(S): at 12:20

## 2021-09-14 RX ADMIN — Medication 100 MILLIGRAM(S): at 05:25

## 2021-09-14 RX ADMIN — Medication 1 APPLICATION(S): at 12:20

## 2021-09-14 RX ADMIN — POLYETHYLENE GLYCOL 3350 17 GRAM(S): 17 POWDER, FOR SOLUTION ORAL at 12:19

## 2021-09-14 RX ADMIN — Medication 500 MILLIGRAM(S): at 17:21

## 2021-09-14 RX ADMIN — Medication 1 MILLIGRAM(S): at 12:19

## 2021-09-14 RX ADMIN — Medication 20 MILLIGRAM(S): at 05:25

## 2021-09-14 RX ADMIN — Medication 1 APPLICATION(S): at 05:26

## 2021-09-14 RX ADMIN — WARFARIN SODIUM 3 MILLIGRAM(S): 2.5 TABLET ORAL at 22:53

## 2021-09-14 RX ADMIN — SPIRONOLACTONE 25 MILLIGRAM(S): 25 TABLET, FILM COATED ORAL at 05:25

## 2021-09-14 RX ADMIN — NYSTATIN CREAM 1 APPLICATION(S): 100000 CREAM TOPICAL at 21:33

## 2021-09-14 RX ADMIN — CHLORHEXIDINE GLUCONATE 1 APPLICATION(S): 213 SOLUTION TOPICAL at 08:51

## 2021-09-14 RX ADMIN — ESCITALOPRAM OXALATE 5 MILLIGRAM(S): 10 TABLET, FILM COATED ORAL at 12:19

## 2021-09-14 RX ADMIN — ATORVASTATIN CALCIUM 40 MILLIGRAM(S): 80 TABLET, FILM COATED ORAL at 21:33

## 2021-09-14 NOTE — PROGRESS NOTE ADULT - SUBJECTIVE AND OBJECTIVE BOX
CARDIOLOGY     PROGRESS  NOTE   ________________________________________________    CHIEF COMPLAINT:Patient is a 76y old  Male who presents with a chief complaint of sob (13 Sep 2021 10:40)  no complain.  	  REVIEW OF SYSTEMS:  CONSTITUTIONAL: No fever, weight loss, or fatigue  EYES: No eye pain, visual disturbances, or discharge  ENT:  No difficulty hearing, tinnitus, vertigo; No sinus or throat pain  NECK: No pain or stiffness  RESPIRATORY: No cough, wheezing, chills or hemoptysis; No Shortness of Breath  CARDIOVASCULAR: No chest pain, palpitations, passing out, dizziness, or leg swelling  GASTROINTESTINAL: No abdominal or epigastric pain. No nausea, vomiting, or hematemesis; No diarrhea or constipation. No melena or hematochezia.  GENITOURINARY: No dysuria, frequency, hematuria, or incontinence  NEUROLOGICAL: No headaches, memory loss, loss of strength, numbness, or tremors  SKIN: No itching, burning, rashes, or lesions   LYMPH Nodes: No enlarged glands  ENDOCRINE: No heat or cold intolerance; No hair loss  MUSCULOSKELETAL: No joint pain or swelling; No muscle, back, or extremity pain  PSYCHIATRIC: No depression, anxiety, mood swings, or difficulty sleeping  HEME/LYMPH: No easy bruising, or bleeding gums  ALLERGY AND IMMUNOLOGIC: No hives or eczema	    [ ] All others negative	  [ ] Unable to obtain    PHYSICAL EXAM:  T(C): 36.9 (09-14-21 @ 05:00), Max: 36.9 (09-14-21 @ 05:00)  HR: 65 (09-14-21 @ 05:00) (62 - 75)  BP: 109/66 (09-14-21 @ 05:00) (94/60 - 109/66)  RR: 18 (09-14-21 @ 05:00) (18 - 18)  SpO2: 95% (09-14-21 @ 05:00) (88% - 98%)  Wt(kg): --  I&O's Summary    13 Sep 2021 07:01  -  14 Sep 2021 07:00  --------------------------------------------------------  IN: 1100 mL / OUT: 2000 mL / NET: -900 mL    14 Sep 2021 07:01  -  14 Sep 2021 07:49  --------------------------------------------------------  IN: 0 mL / OUT: 400 mL / NET: -400 mL        Appearance: Normal	  HEENT:   Normal oral mucosa, PERRL, EOMI	  Lymphatic: No lymphadenopathy  Cardiovascular: Normal S1 S2, No JVD, + murmurs, No edema  Respiratory: Lungs clear to auscultation	  Psychiatry: A & O x 3, Mood & affect appropriate  Gastrointestinal:  Soft, Non-tender, + BS	  Skin: No rashes, No ecchymoses, No cyanosis	  Neurologic: Non-focal  Extremities: Normal range of motion, No clubbing, cyanosis or edema  Vascular: Peripheral pulses palpable 2+ bilaterally    MEDICATIONS  (STANDING):  ascorbic acid 500 milliGRAM(s) Oral two times a day  aspirin  chewable 81 milliGRAM(s) Oral daily  atorvastatin 40 milliGRAM(s) Oral at bedtime  chlorhexidine 2% Cloths 1 Application(s) Topical <User Schedule>  clotrimazole 1% Cream 1 Application(s) Topical two times a day  escitalopram 5 milliGRAM(s) Oral daily  ferrous    sulfate 325 milliGRAM(s) Oral daily  folic acid 1 milliGRAM(s) Oral daily  hydrocortisone 1% Cream 1 Application(s) Topical daily  levothyroxine 137 MICROGram(s) Oral daily  metoprolol succinate  milliGRAM(s) Oral daily  pantoprazole    Tablet 40 milliGRAM(s) Oral before breakfast  polyethylene glycol 3350 17 Gram(s) Oral daily  senna 2 Tablet(s) Oral at bedtime  spironolactone 25 milliGRAM(s) Oral every 12 hours  torsemide 20 milliGRAM(s) Oral daily      TELEMETRY: 	    ECG:  	  RADIOLOGY:  OTHER: 	  	  LABS:	 	    CARDIAC MARKERS:                                8.2    6.94  )-----------( 238      ( 14 Sep 2021 05:54 )             25.7     09-14    136  |  99  |  28<H>  ----------------------------<  119<H>  3.7   |  24  |  1.16    Ca    9.2      14 Sep 2021 05:54  Phos  3.9     09-13  Mg     1.9     09-13      proBNP: Serum Pro-Brain Natriuretic Peptide: 1881 pg/mL (08-18 @ 16:10)    Lipid Profile:   HgA1c:   TSH: Thyroid Stimulating Hormone, Serum: 5.02 uIU/mL (08-25 @ 22:25)    PT/INR - ( 14 Sep 2021 05:54 )   PT: 29.2 sec;   INR: 2.54 ratio        tele a.fib, 80 to 100    Assessment and plan  ---------------------------  s/p CABG 2 vessels and MVR/ bio  physical therapy  pt with hx of PAF, now in a.fib ?amio load will consider possible cardioversion latter on  doing well, post ppm battery change  ac with coumadin, fu inr  continue diuretics  ct was reviewed by ct surgery no intervention for retrosternal hematoma  fu hgb  increase ambulation  ppm site clean and dry  chest x ray noted with  mod  l pleural effusion, continue diuresis, if not resolves need to be tapped  continue current meds  lasix has changed to torsemide  inr is therapeutic  tele noted  increase ambulation

## 2021-09-14 NOTE — PROGRESS NOTE ADULT - PROBLEM SELECTOR PLAN 4
continue postop care  continue asa/  statin/ toprol 100 qd  diuresis / ACE wrap LE   pulm toilet   ck chest xray in am  anticoagulation with coumadin/ daily pt/ inr   pain management   increase activity as tolerated  Discharge planning- rehab wed continue postop care  continue asa/  statin/ toprol 100 qd  diuresis / ACE wrap LE   pulm toilet   ck chest xray in am  anticoagulation with coumadin/ daily pt/ inr    INR 2.5 Coumadin 4 mg tonight   pain management   increase activity as tolerated  COVID swab for discharge  Discharge planning- rehab wed continue postop care  continue asa/  statin/ toprol 100 qd  diuresis / ACE wrap LE   pulm toilet   ck chest xray in am  anticoagulation with coumadin/ daily pt/ inr    INR 2.5 Coumadin 4 mg tonight   pain management   increase activity as tolerated  COVID swab 9/14 negative  stable  for discharge  Discharge planning- rehab wed

## 2021-09-14 NOTE — PROGRESS NOTE ADULT - SUBJECTIVE AND OBJECTIVE BOX
aberile    REVIEW OF SYSTEMS:  GEN: no fever,    no chills  RESP: no SOB,   no cough  CVS: no chest pain,   no palpitations  GI: no abdominal pain,   no nausea,   no vomiting,   no constipation,   no diarrhea  : no dysuria,   no frequency  NEURO: no headache,   no dizziness  PSYCH: no depression,   not anxious  Derm : no rash    MEDICATIONS  (STANDING):  ascorbic acid 500 milliGRAM(s) Oral two times a day  aspirin  chewable 81 milliGRAM(s) Oral daily  atorvastatin 40 milliGRAM(s) Oral at bedtime  chlorhexidine 2% Cloths 1 Application(s) Topical <User Schedule>  clotrimazole 1% Cream 1 Application(s) Topical two times a day  escitalopram 5 milliGRAM(s) Oral daily  ferrous    sulfate 325 milliGRAM(s) Oral daily  folic acid 1 milliGRAM(s) Oral daily  hydrocortisone 1% Cream 1 Application(s) Topical daily  levothyroxine 137 MICROGram(s) Oral daily  metoprolol succinate  milliGRAM(s) Oral daily  pantoprazole    Tablet 40 milliGRAM(s) Oral before breakfast  polyethylene glycol 3350 17 Gram(s) Oral daily  senna 2 Tablet(s) Oral at bedtime  spironolactone 25 milliGRAM(s) Oral every 12 hours  torsemide 20 milliGRAM(s) Oral daily    MEDICATIONS  (PRN):  acetaminophen   Tablet .. 650 milliGRAM(s) Oral every 6 hours PRN Mild Pain (1 - 3)      Vital Signs Last 24 Hrs  T(C): 36.9 (14 Sep 2021 05:00), Max: 36.9 (14 Sep 2021 05:00)  T(F): 98.4 (14 Sep 2021 05:00), Max: 98.4 (14 Sep 2021 05:00)  HR: 65 (14 Sep 2021 05:00) (62 - 75)  BP: 109/66 (14 Sep 2021 05:00) (94/60 - 109/66)  BP(mean): 80 (14 Sep 2021 05:00) (71 - 80)  RR: 18 (14 Sep 2021 05:00) (18 - 18)  SpO2: 95% (14 Sep 2021 05:00) (88% - 98%)  CAPILLARY BLOOD GLUCOSE        I&O's Summary    13 Sep 2021 07:01  -  14 Sep 2021 07:00  --------------------------------------------------------  IN: 1100 mL / OUT: 2000 mL / NET: -900 mL    14 Sep 2021 07:01  -  14 Sep 2021 07:47  --------------------------------------------------------  IN: 0 mL / OUT: 400 mL / NET: -400 mL        PHYSICAL EXAM:  HEAD:  Atraumatic, Normocephalic  NECK: Supple, No   JVD  CHEST/LUNG:   no     rales,     no,    rhonchi  HEART: Regular rate and rhythm;         murmur  ABDOMEN: Soft, Nontender, ;   EXTREMITIES:   less    edema  NEUROLOGY:  alert    LABS:                        8.2    6.94  )-----------( 238      ( 14 Sep 2021 05:54 )             25.7     09-14    136  |  99  |  28<H>  ----------------------------<  119<H>  3.7   |  24  |  1.16    Ca    9.2      14 Sep 2021 05:54  Phos  3.9     09-13  Mg     1.9     09-13      PT/INR - ( 14 Sep 2021 05:54 )   PT: 29.2 sec;   INR: 2.54 ratio                         Thyroid Stimulating Hormone, Serum: 5.02 uIU/mL (08-25 @ 22:25)          Consultant(s) Notes Reviewed:      Care Discussed with Consultants/Other Providers:

## 2021-09-14 NOTE — PROGRESS NOTE ADULT - ASSESSMENT
77 yo  h/o PPM , CAD.  LYMPHEDEMA. HTN, MR,  a.fib on no ac secondary to frequent falls, PVD on pletal presents with SOB. Cardiac workup demonstrates severe MR and multivessel disease.    On 21, pt underwent MVR-t/CABG x 2 w/ LIMA  intraop & post-op bleeding w/ multiple products given ? TRALI-hypoxic- lasix gtt-     EP increased back up rate to 60 - .    gtt - rapid afib -  d/c'd, bb   lasix gtt d/c'd - EXTUBATED.    SDU - BB Increased.  -battery end-of-life - will change battery on  if INR < 1.8 per DR. Christensen   VSS afib 90s  - low dose coumadin - NPO for generator change PPM w/ DR. Christensen.  ck inr in am, d/c plan    Generator change today. Change to toprol xl 100, Ambulation, Anticoagulation    s/p ppm generator change yesterday, dressing cdi. Pt with LLL consolidation, collapse vs effusion, ct non con today, d/c pw   CT stble, reviewed with Dr grider, no further intervention.  Started on diuretics Transfer to floor. Increase PT pw out  9/10 Continue with diuresis, remains +2Kg above preop weight. Anticoagulation for afib and MVR. Pending rehab placement. Chest PT for LLL atelectasis   VSS, INR 1.85 Coumadin 5mg. Aldactone 25 BID added for edema. Plan for discharge to Rehab Monday.    VSS, INR 2.07, Coumadin 5mg. Pt still edematous, lasix changed to torsemide 20mg daily. COVID negative. CXR: Right lung clear, persistent moderate left pleural effusion.   VSS; continue diuresis/ ace wrap LE pulm toilet; repeat chest xray in am; increase activity as tolerated; INR 2.2- coumadin 4 mg this evening  rehab when stable ? wed; ck covid e    VSS      77 yo  h/o PPM , CAD.  LYMPHEDEMA. HTN, MR,  a.fib on no ac secondary to frequent falls, PVD on pletal presents with SOB. Cardiac workup demonstrates severe MR and multivessel disease.    On 21, pt underwent MVR-t/CABG x 2 w/ LIMA  intraop & post-op bleeding w/ multiple products given ? TRALI-hypoxic- lasix gtt-     EP increased back up rate to 60 - .    gtt - rapid afib -  d/c'd, bb   lasix gtt d/c'd - EXTUBATED.    SDU - BB Increased.  -battery end-of-life - will change battery on  if INR < 1.8 per DR. Christensen   VSS afib 90s  - low dose coumadin - NPO for generator change PPM w/ DR. Christensen.  ck inr in am, d/c plan    Generator change today. Change to toprol xl 100, Ambulation, Anticoagulation    s/p ppm generator change yesterday, dressing cdi. Pt with LLL consolidation, collapse vs effusion, ct non con today, d/c pw   CT stble, reviewed with Dr grider, no further intervention.  Started on diuretics Transfer to floor. Increase PT pw out  9/10 Continue with diuresis, remains +2Kg above preop weight. Anticoagulation for afib and MVR. Pending rehab placement. Chest PT for LLL atelectasis   VSS, INR 1.85 Coumadin 5mg. Aldactone 25 BID added for edema. Plan for discharge to Rehab Monday.    VSS, INR 2.07, Coumadin 5mg. Pt still edematous, lasix changed to torsemide 20mg daily. COVID negative. CXR: Right lung clear, persistent moderate left pleural effusion.   VSS; continue diuresis/ ace wrap LE pulm toilet; repeat chest xray in am; increase activity as tolerated; INR 2.2- coumadin 4 mg this evening  rehab when stable ? wed; ck covid e    VSS INR 2.5 Coumadin 4 tonight   negative 1 liter ECHO for today COVID swab for discharge to rehab     77 yo  h/o PPM , CAD.  LYMPHEDEMA. HTN, MR,  a.fib on no ac secondary to frequent falls, PVD on pletal presents with SOB. Cardiac workup demonstrates severe MR and multivessel disease.    On 21, pt underwent MVR-t/CABG x 2 w/ LIMA  intraop & post-op bleeding w/ multiple products given ? TRALI-hypoxic- lasix gtt-     EP increased back up rate to 60 - .    gtt - rapid afib -  d/c'd, bb   lasix gtt d/c'd - EXTUBATED.    SDU - BB Increased.  -battery end-of-life - will change battery on  if INR < 1.8 per DR. Christensen   VSS afib 90s  - low dose coumadin - NPO for generator change PPM w/ DR. Christensen.  ck inr in am, d/c plan    Generator change today. Change to toprol xl 100, Ambulation, Anticoagulation    s/p ppm generator change yesterday, dressing cdi. Pt with LLL consolidation, collapse vs effusion, ct non con today, d/c pw   CT stble, reviewed with Dr grider, no further intervention.  Started on diuretics Transfer to floor. Increase PT pw out  9/10 Continue with diuresis, remains +2Kg above preop weight. Anticoagulation for afib and MVR. Pending rehab placement. Chest PT for LLL atelectasis   VSS, INR 1.85 Coumadin 5mg. Aldactone 25 BID added for edema. Plan for discharge to Rehab Monday.    VSS, INR 2.07, Coumadin 5mg. Pt still edematous, lasix changed to torsemide 20mg daily. COVID negative. CXR: Right lung clear, persistent moderate left pleural effusion.   VSS; continue diuresis/ ace wrap LE pulm toilet; repeat chest xray in am; increase activity as tolerated; INR 2.2- coumadin 4 mg this evening  rehab when stable ? wed; ck covid e    VSS INR 2.5 Coumadin 4 tonight   negative 1 liter ECHO for today COVID  negative   for discharge to rehab

## 2021-09-14 NOTE — PROGRESS NOTE ADULT - PROBLEM SELECTOR PLAN 5
continue postop care  continue asa/  statin/ toprol 100 qd  diuresis   pulm toilet   ck chest xray in am  anticoagulation with coumadin/ daily pt/ inr   pain management   increase activity as tolerated  Discharge planning- rehab wed

## 2021-09-14 NOTE — PROGRESS NOTE ADULT - SUBJECTIVE AND OBJECTIVE BOX
Subjective " Hello I need a bowel movement"    VITAL SIGNS    Telemetry: AFIB 99     Vital Signs Last 24 Hrs  T(C): 36.5 (21 @ 11:30), Max: 36.9 (21 @ 05:00)  T(F): 97.7 (21 @ 11:30), Max: 98.4 (21 @ 05:00)  HR: 87 (21 @ 11:30) (65 - 87)  BP: 101/64 (21 @ 11:30) (94/60 - 109/66)  RR: 18 (21 @ 11:30) (18 - 18)  SpO2: 90% (21 @ 11:30) (90% - 97%)            @ 07:01  -   @ 07:00  --------------------------------------------------------  IN: 1100 mL / OUT: 2000 mL / NET: -900 mL     @ 07:01  -   @ 12:59  --------------------------------------------------------  IN: 360 mL / OUT: 1375 mL / NET: -1015 mL    Daily Weight in k.4 (14 Sep 2021 08:24)                        8.2    6.94  )-----------( 238      ( 14 Sep 2021 05:54 )             25.7     09-14    136  |  99  |  28<H>  ----------------------------<  119<H>  3.7   |  24  |  1.16    Ca    9.2      14 Sep 2021 05:54  Phos  3.9     09-13  Mg     1.9     09-13      ra< from: Xray Chest 1 View- PORTABLE-Routine (Xray Chest 1 View- PORTABLE-Routine in AM.) (21 @ 05:05) >  The heart is enlarged. Left pleural effusion. Left lower lobe pneumonia and/or atelectasis. The right lung is clear. Status post sternotomy. All life supporting devices are in good position and unchanged when compared to previous study done 2021 at 5:24 AM. Status post mitral valve replacement.      < end of copied text >      MEDICATIONS  (STANDING):  ascorbic acid 500 milliGRAM(s) Oral two times a day  aspirin  chewable 81 milliGRAM(s) Oral daily  atorvastatin 40 milliGRAM(s) Oral at bedtime  chlorhexidine 2% Cloths 1 Application(s) Topical <User Schedule>  clotrimazole 1% Cream 1 Application(s) Topical two times a day  escitalopram 5 milliGRAM(s) Oral daily  ferrous    sulfate 325 milliGRAM(s) Oral daily  folic acid 1 milliGRAM(s) Oral daily  hydrocortisone 1% Cream 1 Application(s) Topical daily  levothyroxine 137 MICROGram(s) Oral daily  metoprolol succinate  milliGRAM(s) Oral daily  pantoprazole    Tablet 40 milliGRAM(s) Oral before breakfast  polyethylene glycol 3350 17 Gram(s) Oral daily  senna 2 Tablet(s) Oral at bedtime  spironolactone 25 milliGRAM(s) Oral every 12 hours  torsemide 20 milliGRAM(s) Oral daily    MEDICATIONS  (PRN):  acetaminophen   Tablet .. 650 milliGRAM(s) Oral every 6 hours PRN Mild Pain (1 - 3)                                PHYSICAL EXAM        Neurology: alert and oriented x 3, nonfocal, no gross deficits    CV :S1 IRR    Sternal Wound : RAQUEL Sternum stable     Lungs: B/l easy breath sounds dimished in left base  on room air    Abdomen: soft, nontender, nondistended, positive bowel sounds, needs BM     : voids              Extremities:    eqaul strength throughout    B/lle wram well perfused Ace wrapped +1 edema                                       Physical Therapy Rec:   Home  [  ]   Home w/ PT  [  ]  Rehab  [x  ]    Discussed with Cardiothoracic Team at AM rounds.

## 2021-09-14 NOTE — PROGRESS NOTE ADULT - ASSESSMENT
76  year old male          h/o   , CAD s/p stent on ASA, A-fib/ PPM, , hypothyroid, and total left knee replacement       s/p rash across the upper torso/ back  for past week, pruritic on R arm, and rash in the groin      prior  PPM  interrogation  with  [facility]/   s/p  brief   bursts  of  afib/ , on prior visit          admitted with sob,  from acute  diastolic  chf, related  to non compliance  with his  lasix   wound care/  lymphedema,  carla left  leg   *  CAD/ AFIb  /PPM,/ HTN   , stent in 2007   *  h/o  AFIB,  was  not  on  a/c    * Anemia, , hb stable, had  been seen by  gi  eval dr joann sanchez in past    right leg with distal redness.  not cellulitis / c/c  for past few  months  an d left leg lymphedema     xray spine. horse  shoe left kidney/ OA  of  spine  on    lasix,  ha  improved    * Echo, normal  ef/ severe MR and,  ERENDIRA ,  severe  MR    cath, with 2 vessel disease,  s/p  CABG and  MVR  surg d r marni   h/o  Afib on  coumadin  on asa.  lipitor, torpol,  synthroid. toresmide  hb is 7/  24. Ct chest, retrosternal hematoma.  mod left pl effusion   CT surg note  seen,  per   surgeon   ok to  continue  coumadin/  for  afib  follow  daily inr/  pt ha s been ambulating  inr is therapeutic   and   hb  is 8           f/p wih  dr mikayla dikc   ra from: CT Chest No Cont (09.08.21 @ 08:32) >  IMPRESSION:  Left pleural effusion with near complete left lower lobe atelectasis.  6.7 x 2.8 cm retrosternal hematoma. No comment can be made about active extravasation on this noncontrast exam.  These findings were discussed with MARIOLA MCGEE on 9/8/2021 at 2:18 PM by Dr. Morfin with read back confirmation.  --- End of Report ---  < end of copied text >

## 2021-09-15 LAB
ALBUMIN SERPL ELPH-MCNC: 3 G/DL — LOW (ref 3.3–5)
ALP SERPL-CCNC: 108 U/L — SIGNIFICANT CHANGE UP (ref 40–120)
ALT FLD-CCNC: 9 U/L — LOW (ref 10–45)
ANION GAP SERPL CALC-SCNC: 13 MMOL/L — SIGNIFICANT CHANGE UP (ref 5–17)
AST SERPL-CCNC: 12 U/L — SIGNIFICANT CHANGE UP (ref 10–40)
BILIRUB SERPL-MCNC: 0.6 MG/DL — SIGNIFICANT CHANGE UP (ref 0.2–1.2)
BUN SERPL-MCNC: 27 MG/DL — HIGH (ref 7–23)
CALCIUM SERPL-MCNC: 9.3 MG/DL — SIGNIFICANT CHANGE UP (ref 8.4–10.5)
CHLORIDE SERPL-SCNC: 99 MMOL/L — SIGNIFICANT CHANGE UP (ref 96–108)
CO2 SERPL-SCNC: 24 MMOL/L — SIGNIFICANT CHANGE UP (ref 22–31)
CREAT SERPL-MCNC: 1.1 MG/DL — SIGNIFICANT CHANGE UP (ref 0.5–1.3)
GLUCOSE SERPL-MCNC: 115 MG/DL — HIGH (ref 70–99)
HCT VFR BLD CALC: 26.8 % — LOW (ref 39–50)
HGB BLD-MCNC: 8.4 G/DL — LOW (ref 13–17)
INR BLD: 2.54 RATIO — HIGH (ref 0.88–1.16)
MCHC RBC-ENTMCNC: 28.6 PG — SIGNIFICANT CHANGE UP (ref 27–34)
MCHC RBC-ENTMCNC: 31.3 GM/DL — LOW (ref 32–36)
MCV RBC AUTO: 91.2 FL — SIGNIFICANT CHANGE UP (ref 80–100)
NRBC # BLD: 0 /100 WBCS — SIGNIFICANT CHANGE UP (ref 0–0)
PLATELET # BLD AUTO: 244 K/UL — SIGNIFICANT CHANGE UP (ref 150–400)
POTASSIUM SERPL-MCNC: 3.4 MMOL/L — LOW (ref 3.5–5.3)
POTASSIUM SERPL-SCNC: 3.4 MMOL/L — LOW (ref 3.5–5.3)
PROT SERPL-MCNC: 5.9 G/DL — LOW (ref 6–8.3)
PROTHROM AB SERPL-ACNC: 29.2 SEC — HIGH (ref 10.6–13.6)
RBC # BLD: 2.94 M/UL — LOW (ref 4.2–5.8)
RBC # FLD: 15.4 % — HIGH (ref 10.3–14.5)
SODIUM SERPL-SCNC: 136 MMOL/L — SIGNIFICANT CHANGE UP (ref 135–145)
WBC # BLD: 6.07 K/UL — SIGNIFICANT CHANGE UP (ref 3.8–10.5)
WBC # FLD AUTO: 6.07 K/UL — SIGNIFICANT CHANGE UP (ref 3.8–10.5)

## 2021-09-15 RX ORDER — POTASSIUM BICARBONATE 978 MG/1
25 TABLET, EFFERVESCENT ORAL
Refills: 0 | Status: COMPLETED | OUTPATIENT
Start: 2021-09-15 | End: 2021-09-15

## 2021-09-15 RX ORDER — WARFARIN SODIUM 2.5 MG/1
3 TABLET ORAL ONCE
Refills: 0 | Status: COMPLETED | OUTPATIENT
Start: 2021-09-15 | End: 2021-09-15

## 2021-09-15 RX ORDER — WARFARIN SODIUM 2.5 MG/1
4 TABLET ORAL ONCE
Refills: 0 | Status: DISCONTINUED | OUTPATIENT
Start: 2021-09-15 | End: 2021-09-15

## 2021-09-15 RX ADMIN — CHLORHEXIDINE GLUCONATE 1 APPLICATION(S): 213 SOLUTION TOPICAL at 14:40

## 2021-09-15 RX ADMIN — POTASSIUM BICARBONATE 25 MILLIEQUIVALENT(S): 978 TABLET, EFFERVESCENT ORAL at 09:05

## 2021-09-15 RX ADMIN — SENNA PLUS 2 TABLET(S): 8.6 TABLET ORAL at 21:05

## 2021-09-15 RX ADMIN — Medication 81 MILLIGRAM(S): at 11:09

## 2021-09-15 RX ADMIN — Medication 500 MILLIGRAM(S): at 05:38

## 2021-09-15 RX ADMIN — Medication 20 MILLIGRAM(S): at 05:39

## 2021-09-15 RX ADMIN — PANTOPRAZOLE SODIUM 40 MILLIGRAM(S): 20 TABLET, DELAYED RELEASE ORAL at 05:38

## 2021-09-15 RX ADMIN — SPIRONOLACTONE 25 MILLIGRAM(S): 25 TABLET, FILM COATED ORAL at 17:03

## 2021-09-15 RX ADMIN — WARFARIN SODIUM 3 MILLIGRAM(S): 2.5 TABLET ORAL at 21:05

## 2021-09-15 RX ADMIN — Medication 1 APPLICATION(S): at 17:04

## 2021-09-15 RX ADMIN — ATORVASTATIN CALCIUM 40 MILLIGRAM(S): 80 TABLET, FILM COATED ORAL at 21:05

## 2021-09-15 RX ADMIN — POTASSIUM BICARBONATE 25 MILLIEQUIVALENT(S): 978 TABLET, EFFERVESCENT ORAL at 08:28

## 2021-09-15 RX ADMIN — NYSTATIN CREAM 1 APPLICATION(S): 100000 CREAM TOPICAL at 05:39

## 2021-09-15 RX ADMIN — Medication 500 MILLIGRAM(S): at 17:02

## 2021-09-15 RX ADMIN — Medication 1 APPLICATION(S): at 11:15

## 2021-09-15 RX ADMIN — Medication 137 MICROGRAM(S): at 05:38

## 2021-09-15 RX ADMIN — ESCITALOPRAM OXALATE 5 MILLIGRAM(S): 10 TABLET, FILM COATED ORAL at 11:09

## 2021-09-15 RX ADMIN — Medication 100 MILLIGRAM(S): at 05:39

## 2021-09-15 RX ADMIN — Medication 325 MILLIGRAM(S): at 11:10

## 2021-09-15 RX ADMIN — Medication 1 MILLIGRAM(S): at 11:09

## 2021-09-15 RX ADMIN — SPIRONOLACTONE 25 MILLIGRAM(S): 25 TABLET, FILM COATED ORAL at 05:39

## 2021-09-15 RX ADMIN — Medication 1 APPLICATION(S): at 05:39

## 2021-09-15 RX ADMIN — NYSTATIN CREAM 1 APPLICATION(S): 100000 CREAM TOPICAL at 17:02

## 2021-09-15 NOTE — PROGRESS NOTE ADULT - SUBJECTIVE AND OBJECTIVE BOX
INTERVAL HPI/OVERNIGHT EVENTS:  pt doing well, no complaints   events noted, hgb stable        MEDICATIONS  (PRN):      Allergies    alfuzosin (Hives)  levofloxacin (Hives)  Myrbetriq (Hives)  tamsulosin (Hives)    Intolerances      ROS   unable to obtain         PHYSICAL EXAM:   Vital Signs:  Vital Signs Last 24 Hrs  T(C): 37.1 (29 Aug 2021 04:21), Max: 37.1 (29 Aug 2021 04:21)  T(F): 98.7 (29 Aug 2021 04:21), Max: 98.7 (29 Aug 2021 04:21)  HR: 55 (29 Aug 2021 04:21) (55 - 78)  BP: 116/61 (29 Aug 2021 04:21) (116/61 - 146/63)  BP(mean): --  RR: 18 (29 Aug 2021 04:21) (18 - 18)  SpO2: 93% (29 Aug 2021 04:21) (93% - 96%)  Daily     Daily Weight in k.2 (29 Aug 2021 07:30)I&O's Summary    28 Aug 2021 07:  -  29 Aug 2021 07:00  --------------------------------------------------------  IN: 1110 mL / OUT: 5 mL / NET: -915 mL    29 Aug 2021 07:01  -  29 Aug 2021 10:27  --------------------------------------------------------  IN: 240 mL / OUT: 800 mL / NET: -560 mL        GENERAL:  Appears stated age, well-groomed, well-nourished, no distress  HEENT:  NC/AT,  conjunctivae clear and pink, no thyromegaly, nodules, adenopathy, no JVD, sclera -anicteric  CHEST:  Full & symmetric excursion, no increased effort, breath sounds clear  HEART:  Regular rhythm, S1, S2, no murmur/rub/S3/S4, no abdominal bruit, no edema  ABDOMEN:  Soft, non-tender, non-distended, normoactive bowel sounds,  no masses ,no hepato-splenomegaly, no signs of chronic liver disease  EXTEREMITIES:  no cyanosis,clubbing or edema  SKIN:  No rash/erythema/ecchymoses/petechiae/wounds/abscess/warm/dry  NEURO:  Alert, oriented, no asterixis, no tremor, no encephalopathy      LABS:                        11.5   5.65  )-----------( 138      ( 29 Aug 2021 06:30 )             36.1         139  |  105  |  44<H>  ----------------------------<  113<H>  4.5   |  21<L>  |  1.06    Ca    9.8      29 Aug 2021 06:30          amylase   lipase  RADIOLOGY & ADDITIONAL TESTS:

## 2021-09-15 NOTE — PROGRESS NOTE ADULT - PROBLEM SELECTOR PLAN 5
continue postop care  continue asa/  statin/ toprol 100 qd  diuresis   pulm toilet   ck chest xray in am  anticoagulation with coumadin/ daily pt/ inr   pain management   increase activity as tolerated  Discharge planning- rehab thursday

## 2021-09-15 NOTE — PROGRESS NOTE ADULT - ASSESSMENT
75 yo  h/o PPM , CAD.  LYMPHEDEMA. HTN, MR,  a.fib on no ac secondary to frequent falls, PVD on pletal presents with SOB. Cardiac workup demonstrates severe MR and multivessel disease.    On 21, pt underwent MVR-t/CABG x 2 w/ LIMA  intraop & post-op bleeding w/ multiple products given ? TRALI-hypoxic- lasix gtt-     EP increased back up rate to 60 - .    gtt - rapid afib -  d/c'd, bb   lasix gtt d/c'd - EXTUBATED.    SDU - BB Increased.  -battery end-of-life - will change battery on  if INR < 1.8 per DR. Christensen   VSS afib 90s  - low dose coumadin - NPO for generator change PPM w/ DR. Christensen.  ck inr in am, d/c plan    Generator change today. Change to toprol xl 100, Ambulation, Anticoagulation    s/p ppm generator change yesterday, dressing cdi. Pt with LLL consolidation, collapse vs effusion, ct non con today, d/c pw   CT stble, reviewed with Dr grider, no further intervention.  Started on diuretics Transfer to floor. Increase PT pw out  9/10 Continue with diuresis, remains +2Kg above preop weight. Anticoagulation for afib and MVR. Pending rehab placement. Chest PT for LLL atelectasis   VSS, INR 1.85 Coumadin 5mg. Aldactone 25 BID added for edema. Plan for discharge to Rehab Monday.    VSS, INR 2.07, Coumadin 5mg. Pt still edematous, lasix changed to torsemide 20mg daily. COVID negative. CXR: Right lung clear, persistent moderate left pleural effusion.   VSS; continue diuresis/ ace wrap LE pulm toilet; repeat chest xray in am; increase activity as tolerated; INR 2.2- coumadin 4 mg this evening  rehab when stable ? wed; ck covid e    VSS INR 2.5 Coumadin 4 tonight   negative 1 liter ECHO for today COVID  negative   for discharge to rehab  9/15 VSS INR 2.54 Coumadin ? tonight negative  - 1375   ECHO reveal s small organized pericardial effusion no evidence of tamponade - US  left chest today low suspicion for effusion likely atelectasis.

## 2021-09-15 NOTE — PROGRESS NOTE ADULT - SUBJECTIVE AND OBJECTIVE BOX
Subjective " hello I  can walk  some"    telemetry   Afib 70-90      Vital Signs Last 24 Hrs  T(C): 36.6 (09-15-21 @ 05:27), Max: 37.1 (21 @ 20:31)  T(F): 97.9 (09-15-21 @ 05:27), Max: 98.7 (21 @ 20:31)  HR: 94 (09-15-21 @ 05:27) (75 - 94)  BP: 102/69 (09-15-21 @ 05:27) (90/53 - 102/69)  RR: 18 (09-15-21 @ 09:45) (18 - 19)  SpO2: 97% (09-15-21 @ 09:45) (90% - 97%)            @ 07:01  -  09-15 @ 07:00  --------------------------------------------------------  IN: 1000 mL / OUT: 2375 mL / NET: -1375 mL    09-15 @ 07:01  -  09-15 @ 10:30  --------------------------------------------------------  IN: 240 mL / OUT: 300 mL / NET: -60 mL   Daily Weight in k.2 (15 Sep 2021 08:12)                          8.4    6.07  )-----------( 244      ( 15 Sep 2021 05:56 )             26.8       09-15    136  |  99  |  27<H>  ----------------------------<  115<H>  3.4<L>   |  24  |  1.10    Ca    9.3      15 Sep 2021 05:56    TPro  5.9<L>  /  Alb  3.0<L>  /  TBili  0.6  /  DBili  x   /  AST  12  /  ALT  9<L>  /  AlkPhos  108  09-15      Coumadin    [ x] YES          [  ]      NO         Reason:     afib  ECHO      Patient in atrial fibrillation; ejection fracion varies  with R-R interval. Endocardial visualization enhanced with  intravenous injection of Ultrasonic Enhancing Agent  (Definity). Grossly normal left ventricular systolic  function. Septal motion consistent with prior cardiac  surgery.  2. Right ventricle not well visualized; right ventricle  appears enlarged with decreased right ventricular systolic  function.  A device wire is noted in the right heart.  3. Thickened pericardium. Small, organized pericardial  effusion predominantly seen anterior to the right  ventricle. The effusion measures up to approximately 1.0 cm  anterior to the right ventricle. Subcostal images are very  limited. No obvious echocardiographic evidence of  pericardial tamponade.                  PHYSICAL EXAM  Neurology: alert and oriented x 3, nonfocal, no gross deficits    CV :S1IRR    Sternal Wound :  RAQUEL sternum  Stable    Lungs: B/l scattered Rhonchi breath sounds on 2l nc     Abdomen: soft, nontender, nondistended, positive bowel sounds, last bowel movement     :voids               Extremities: equal strength throughout    B/lle warm well perfused + DP     left leg with latha                                          Physical Therapy Rec:   Home  [  ]   Home w/ PT  [  ]  Rehab  x[  ]    Discussed with Cardiothoracic Team at AM rounds.

## 2021-09-15 NOTE — PROGRESS NOTE ADULT - SUBJECTIVE AND OBJECTIVE BOX
CARDIOLOGY     PROGRESS  NOTE   ________________________________________________    CHIEF COMPLAINT:Patient is a 76y old  Male who presents with a chief complaint of sob (14 Sep 2021 12:58)  no complain.  	  REVIEW OF SYSTEMS:  CONSTITUTIONAL: No fever, weight loss, or fatigue  EYES: No eye pain, visual disturbances, or discharge  ENT:  No difficulty hearing, tinnitus, vertigo; No sinus or throat pain  NECK: No pain or stiffness  RESPIRATORY: No cough, wheezing, chills or hemoptysis; No Shortness of Breath  CARDIOVASCULAR: No chest pain, palpitations, passing out, dizziness, or leg swelling  GASTROINTESTINAL: No abdominal or epigastric pain. No nausea, vomiting, or hematemesis; No diarrhea or constipation. No melena or hematochezia.  GENITOURINARY: No dysuria, frequency, hematuria, or incontinence  NEUROLOGICAL: No headaches, memory loss, loss of strength, numbness, or tremors  SKIN: No itching, burning, rashes, or lesions   LYMPH Nodes: No enlarged glands  ENDOCRINE: No heat or cold intolerance; No hair loss  MUSCULOSKELETAL: No joint pain or swelling; No muscle, back, or extremity pain  PSYCHIATRIC: No depression, anxiety, mood swings, or difficulty sleeping  HEME/LYMPH: No easy bruising, or bleeding gums  ALLERGY AND IMMUNOLOGIC: No hives or eczema	    [ ] All others negative	  [ ] Unable to obtain    PHYSICAL EXAM:  T(C): 36.6 (09-15-21 @ 05:27), Max: 37.1 (09-14-21 @ 20:31)  HR: 94 (09-15-21 @ 05:27) (75 - 94)  BP: 102/69 (09-15-21 @ 05:27) (90/53 - 102/69)  RR: 18 (09-15-21 @ 05:27) (18 - 19)  SpO2: 96% (09-15-21 @ 05:27) (90% - 97%)  Wt(kg): --  I&O's Summary    14 Sep 2021 07:01  -  15 Sep 2021 07:00  --------------------------------------------------------  IN: 1000 mL / OUT: 2375 mL / NET: -1375 mL        Appearance: Normal	  HEENT:   Normal oral mucosa, PERRL, EOMI	  Lymphatic: No lymphadenopathy  Cardiovascular: Normal S1 S2, No JVD, + murmurs, No edema  Respiratory: Lungs clear to auscultation	  Psychiatry: A & O x 3, Mood & affect appropriate  Gastrointestinal:  Soft, Non-tender, + BS	  Skin: No rashes, No ecchymoses, No cyanosis	  Neurologic: Non-focal  Extremities: Normal range of motion, No clubbing, cyanosis or edema  Vascular: Peripheral pulses palpable 2+ bilaterally    MEDICATIONS  (STANDING):  ascorbic acid 500 milliGRAM(s) Oral two times a day  aspirin  chewable 81 milliGRAM(s) Oral daily  atorvastatin 40 milliGRAM(s) Oral at bedtime  chlorhexidine 2% Cloths 1 Application(s) Topical <User Schedule>  clotrimazole 1% Cream 1 Application(s) Topical two times a day  escitalopram 5 milliGRAM(s) Oral daily  ferrous    sulfate 325 milliGRAM(s) Oral daily  folic acid 1 milliGRAM(s) Oral daily  hydrocortisone 1% Cream 1 Application(s) Topical daily  levothyroxine 137 MICROGram(s) Oral daily  metoprolol succinate  milliGRAM(s) Oral daily  nystatin Cream 1 Application(s) Topical two times a day  pantoprazole    Tablet 40 milliGRAM(s) Oral before breakfast  polyethylene glycol 3350 17 Gram(s) Oral daily  senna 2 Tablet(s) Oral at bedtime  spironolactone 25 milliGRAM(s) Oral every 12 hours  torsemide 20 milliGRAM(s) Oral daily      TELEMETRY: 	    ECG:  	  RADIOLOGY:  OTHER: 	  	  LABS:	 	    CARDIAC MARKERS:                                8.4    6.07  )-----------( 244      ( 15 Sep 2021 05:56 )             26.8     09-15    136  |  99  |  27<H>  ----------------------------<  115<H>  3.4<L>   |  24  |  1.10    Ca    9.3      15 Sep 2021 05:56    TPro  5.9<L>  /  Alb  3.0<L>  /  TBili  0.6  /  DBili  x   /  AST  12  /  ALT  9<L>  /  AlkPhos  108  09-15    proBNP: Serum Pro-Brain Natriuretic Peptide: 1881 pg/mL (08-18 @ 16:10)    Lipid Profile:   HgA1c:   TSH: Thyroid Stimulating Hormone, Serum: 5.02 uIU/mL (08-25 @ 22:25)    PT/INR - ( 15 Sep 2021 05:56 )   PT: 29.2 sec;   INR: 2.54 ratio               Assessment and plan  ---------------------------  s/p CABG 2 vessels and MVR/ bio  physical therapy  pt with hx of PAF, now in a.fib ?amio load will consider possible cardioversion latter on  doing well, post ppm battery change  ac with coumadin, fu inr  continue diuretics  ct was reviewed by ct surgery no intervention for retrosternal hematoma  fu hgb  increase ambulation  ppm site clean and dry  chest x ray noted with  mod  l pleural effusion, continue diuresis, if not resolves need to be tapped  continue current meds  lasix has changed to torsemide  inr is therapeutic  tele noted  increase ambulation  replete K  ppm site clean and dry  ?dc today

## 2021-09-15 NOTE — PROGRESS NOTE ADULT - SUBJECTIVE AND OBJECTIVE BOX
afberile  REVIEW OF SYSTEMS:  GEN: no fever,    no chills  RESP: no SOB,   no cough  CVS: no chest pain,   no palpitations  GI: no abdominal pain,   no nausea,   no vomiting,   no constipation,   no diarrhea  : no dysuria,   no frequency  NEURO: no headache,   no dizziness  PSYCH: no depression,   not anxious  Derm : no rash    MEDICATIONS  (STANDING):  ascorbic acid 500 milliGRAM(s) Oral two times a day  aspirin  chewable 81 milliGRAM(s) Oral daily  atorvastatin 40 milliGRAM(s) Oral at bedtime  chlorhexidine 2% Cloths 1 Application(s) Topical <User Schedule>  clotrimazole 1% Cream 1 Application(s) Topical two times a day  escitalopram 5 milliGRAM(s) Oral daily  ferrous    sulfate 325 milliGRAM(s) Oral daily  folic acid 1 milliGRAM(s) Oral daily  hydrocortisone 1% Cream 1 Application(s) Topical daily  levothyroxine 137 MICROGram(s) Oral daily  metoprolol succinate  milliGRAM(s) Oral daily  nystatin Cream 1 Application(s) Topical two times a day  pantoprazole    Tablet 40 milliGRAM(s) Oral before breakfast  polyethylene glycol 3350 17 Gram(s) Oral daily  potassium bicarbonate/potassium citrate 25 milliEquivalent(s) Oral every 1 hour  senna 2 Tablet(s) Oral at bedtime  spironolactone 25 milliGRAM(s) Oral every 12 hours  torsemide 20 milliGRAM(s) Oral daily    MEDICATIONS  (PRN):  acetaminophen   Tablet .. 650 milliGRAM(s) Oral every 6 hours PRN Mild Pain (1 - 3)      Vital Signs Last 24 Hrs  T(C): 36.6 (15 Sep 2021 05:27), Max: 37.1 (14 Sep 2021 20:31)  T(F): 97.9 (15 Sep 2021 05:27), Max: 98.7 (14 Sep 2021 20:31)  HR: 94 (15 Sep 2021 05:27) (75 - 94)  BP: 102/69 (15 Sep 2021 05:27) (90/53 - 102/69)  BP(mean): 80 (15 Sep 2021 05:27) (80 - 80)  RR: 18 (15 Sep 2021 05:27) (18 - 19)  SpO2: 96% (15 Sep 2021 05:27) (90% - 97%)  CAPILLARY BLOOD GLUCOSE        I&O's Summary    14 Sep 2021 07:01  -  15 Sep 2021 07:00  --------------------------------------------------------  IN: 1000 mL / OUT: 2375 mL / NET: -1375 mL        PHYSICAL EXAM:  HEAD:  Atraumatic, Normocephalic  NECK: Supple, No   JVD  CHEST/LUNG:   no     rales,     no,    rhonchi  HEART: Regular rate and rhythm;         murmur  ABDOMEN: Soft, Nontender, ;   EXTREMITIES:   less     edema  NEUROLOGY:  alert    LABS:                        8.4    6.07  )-----------( 244      ( 15 Sep 2021 05:56 )             26.8     09-15    136  |  99  |  27<H>  ----------------------------<  115<H>  3.4<L>   |  24  |  1.10    Ca    9.3      15 Sep 2021 05:56    TPro  5.9<L>  /  Alb  3.0<L>  /  TBili  0.6  /  DBili  x   /  AST  12  /  ALT  9<L>  /  AlkPhos  108  09-15    PT/INR - ( 15 Sep 2021 05:56 )   PT: 29.2 sec;   INR: 2.54 ratio                         Thyroid Stimulating Hormone, Serum: 5.02 uIU/mL (08-25 @ 22:25)          Consultant(s) Notes Reviewed:      Care Discussed with Consultants/Other Providers:

## 2021-09-15 NOTE — PROGRESS NOTE ADULT - PROBLEM SELECTOR PLAN 4
continue postop care  continue asa/  statin/ toprol 100 qd  diuresis / ACE wrap LE   pulm toilet   ck chest xray in am   9/15 US left chest   anticoagulation with coumadin/ daily pt/ inr    INR 2.5 Coumadin 4 mg tonight   pain management   increase activity as tolerated  COVID swab 9/14 negative    Discharge planning- rehab

## 2021-09-15 NOTE — PROGRESS NOTE ADULT - ASSESSMENT
76  year old male          h/o   , CAD s/p stent on ASA, A-fib/ PPM, , hypothyroid, and total left knee replacement       s/p rash across the upper torso/ back  for past week, pruritic on R arm, and rash in the groin      prior  PPM  interrogation  with  W[facility]/   s/p  brief   bursts  of  afib/ , on prior visit          admitted with sob,  from acute  diastolic  chf, related  to non compliance  with his  lasix   wound care/  lymphedema,  carla left  leg   *  CAD/ AFIb  /PPM,/ HTN   , stent in 2007   *  h/o  AFIB,  was  not  on  a/c    * Anemia, , hb stable, had  been seen by  gi  eval dr joann sanchez in past    right leg with distal redness.  not cellulitis / c/c  for past few  months  an d left leg lymphedema     xray spine. horse  shoe left kidney/ OA  of  spine  on    lasix,  ha  improved    * Echo, normal  ef/ severe MR and,  ERENDIRA ,  severe  MR    cath, with 2 vessel disease,  s/p  CABG and  MVR  surg d r marni   h/o  Afib on  coumadin  on asa.  lipitor, torpol,  synthroid. toresmide   Ct chest, retrosternal hematoma.  mod left pl effusion   CT surg note  seen,  per   surgeon   ok to  continue  coumadin/  for  afib  follow  daily inr/  pt ha s been ambulating  inr is therapeutic   today/  d/c  planning in progress   pt  will  f/p  with d r p nelson           f/p renee dick   ra from: CT Chest No Cont (09.08.21 @ 08:32) >  IMPRESSION:  Left pleural effusion with near complete left lower lobe atelectasis.  6.7 x 2.8 cm retrosternal hematoma. No comment can be made about active extravasation on this noncontrast exam.  These findings were discussed with NP FRANCISCO MCGEE on 9/8/2021 at 2:18 PM by Dr. Morfin with read back confirmation.  --- End of Report ---  < end of copied text >                                 76  year old male          h/o   , CAD s/p stent on ASA, A-fib/ PPM, , hypothyroid, and total left knee replacement       s/p rash across the upper torso/ back  for past week, pruritic on R arm, and rash in the groin      prior  PPM  interrogation  with  WCT/   s/p  brief   bursts  of  afib/ , on prior visit          admitted with sob,  from acute  diastolic  chf, related  to non compliance  with his  lasix   wound care/  lymphedema,  carla left  leg   *  CAD/ AFIb  /PPM,/ HTN   , stent in 2007   *  h/o  AFIB,  was  not  on  a/c    * Anemia, , hb stable, had  been seen by  gi  eval dr joann sanchez in past    right leg with distal redness.  not cellulitis / c/c  for past few  months  an d left leg lymphedema     xray spine. horse  shoe left kidney/ OA  of  spine  on    lasix,  ha  improved    * Echo, normal  ef/ severe MR and,  ERENDIRA ,  severe  MR    cath, with 2 vessel disease,  s/p  CABG and  MVR  surg d r marni   h/o  Afib on  coumadin  on asa.  lipitor, torpol,  synthroid. toresmide   Ct chest, retrosternal hematoma.  mod left pl effusion   CT surg note  seen,  per   surgeon   ok to  continue  coumadin/  for  afib  follow  daily inr/   cxr,  large left  pl  effusion,  will need  drainage/  hold coumadin           f/p wih  dr mikayla dick   ra from: CT Chest No Cont (09.08.21 @ 08:32) >  IMPRESSION:  Left pleural effusion with near complete left lower lobe atelectasis.  6.7 x 2.8 cm retrosternal hematoma. No comment can be made about active extravasation on this noncontrast exam.  These findings were discussed with MARIOLA MCGEE on 9/8/2021 at 2:18 PM by Dr. Morfin with read back confirmation.  --- End of Report ---  < end of copied text >

## 2021-09-15 NOTE — PROGRESS NOTE ADULT - ASSESSMENT
anemia  afib  cad    s/p CABG and MVR 8/30  no overt gi bleed  denies melena/hematochezia  hgb stable  had egd/colon 12/2020 (cher metha)  cbc daily  transfuse PRN  s/p PPM generator change   cardio following   CXR noted   will follow   dw patient     Advanced care planning was discussed with patient and family.  Advanced care planning forms were reviewed and discussed.  Risks, benefits and alternatives of gastroenterologic procedures were discussed in detail and all questions were answered.    30 minutes spent.

## 2021-09-16 ENCOUNTER — TRANSCRIPTION ENCOUNTER (OUTPATIENT)
Age: 76
End: 2021-09-16

## 2021-09-16 VITALS
HEART RATE: 85 BPM | TEMPERATURE: 98 F | DIASTOLIC BLOOD PRESSURE: 62 MMHG | RESPIRATION RATE: 18 BRPM | OXYGEN SATURATION: 98 % | SYSTOLIC BLOOD PRESSURE: 101 MMHG

## 2021-09-16 LAB
ANION GAP SERPL CALC-SCNC: 12 MMOL/L — SIGNIFICANT CHANGE UP (ref 5–17)
APTT BLD: 35.9 SEC — HIGH (ref 27.5–35.5)
BUN SERPL-MCNC: 30 MG/DL — HIGH (ref 7–23)
CALCIUM SERPL-MCNC: 9.2 MG/DL — SIGNIFICANT CHANGE UP (ref 8.4–10.5)
CHLORIDE SERPL-SCNC: 96 MMOL/L — SIGNIFICANT CHANGE UP (ref 96–108)
CO2 SERPL-SCNC: 27 MMOL/L — SIGNIFICANT CHANGE UP (ref 22–31)
CREAT SERPL-MCNC: 1.15 MG/DL — SIGNIFICANT CHANGE UP (ref 0.5–1.3)
GLUCOSE SERPL-MCNC: 114 MG/DL — HIGH (ref 70–99)
HCT VFR BLD CALC: 27.4 % — LOW (ref 39–50)
HGB BLD-MCNC: 8.7 G/DL — LOW (ref 13–17)
INR BLD: 2.39 RATIO — HIGH (ref 0.88–1.16)
MCHC RBC-ENTMCNC: 29.4 PG — SIGNIFICANT CHANGE UP (ref 27–34)
MCHC RBC-ENTMCNC: 31.8 GM/DL — LOW (ref 32–36)
MCV RBC AUTO: 92.6 FL — SIGNIFICANT CHANGE UP (ref 80–100)
NRBC # BLD: 0 /100 WBCS — SIGNIFICANT CHANGE UP (ref 0–0)
PLATELET # BLD AUTO: 250 K/UL — SIGNIFICANT CHANGE UP (ref 150–400)
POTASSIUM SERPL-MCNC: 3.9 MMOL/L — SIGNIFICANT CHANGE UP (ref 3.5–5.3)
POTASSIUM SERPL-SCNC: 3.9 MMOL/L — SIGNIFICANT CHANGE UP (ref 3.5–5.3)
PROTHROM AB SERPL-ACNC: 27.5 SEC — HIGH (ref 10.6–13.6)
RBC # BLD: 2.96 M/UL — LOW (ref 4.2–5.8)
RBC # FLD: 15 % — HIGH (ref 10.3–14.5)
SODIUM SERPL-SCNC: 135 MMOL/L — SIGNIFICANT CHANGE UP (ref 135–145)
WBC # BLD: 5.6 K/UL — SIGNIFICANT CHANGE UP (ref 3.8–10.5)
WBC # FLD AUTO: 5.6 K/UL — SIGNIFICANT CHANGE UP (ref 3.8–10.5)

## 2021-09-16 PROCEDURE — 93005 ELECTROCARDIOGRAM TRACING: CPT

## 2021-09-16 PROCEDURE — 87102 FUNGUS ISOLATION CULTURE: CPT

## 2021-09-16 PROCEDURE — 84484 ASSAY OF TROPONIN QUANT: CPT

## 2021-09-16 PROCEDURE — 86923 COMPATIBILITY TEST ELECTRIC: CPT

## 2021-09-16 PROCEDURE — 85014 HEMATOCRIT: CPT

## 2021-09-16 PROCEDURE — 97530 THERAPEUTIC ACTIVITIES: CPT

## 2021-09-16 PROCEDURE — C1889: CPT

## 2021-09-16 PROCEDURE — 85379 FIBRIN DEGRADATION QUANT: CPT

## 2021-09-16 PROCEDURE — C1769: CPT

## 2021-09-16 PROCEDURE — 86901 BLOOD TYPING SEROLOGIC RH(D): CPT

## 2021-09-16 PROCEDURE — 85396 CLOTTING ASSAY WHOLE BLOOD: CPT

## 2021-09-16 PROCEDURE — P9045: CPT

## 2021-09-16 PROCEDURE — 87641 MR-STAPH DNA AMP PROBE: CPT

## 2021-09-16 PROCEDURE — C1729: CPT

## 2021-09-16 PROCEDURE — P9047: CPT

## 2021-09-16 PROCEDURE — 93880 EXTRACRANIAL BILAT STUDY: CPT

## 2021-09-16 PROCEDURE — 36430 TRANSFUSION BLD/BLD COMPNT: CPT

## 2021-09-16 PROCEDURE — 86850 RBC ANTIBODY SCREEN: CPT

## 2021-09-16 PROCEDURE — 93325 DOPPLER ECHO COLOR FLOW MAPG: CPT

## 2021-09-16 PROCEDURE — 82803 BLOOD GASES ANY COMBINATION: CPT

## 2021-09-16 PROCEDURE — C8924: CPT

## 2021-09-16 PROCEDURE — 82565 ASSAY OF CREATININE: CPT

## 2021-09-16 PROCEDURE — 85027 COMPLETE CBC AUTOMATED: CPT

## 2021-09-16 PROCEDURE — 94010 BREATHING CAPACITY TEST: CPT

## 2021-09-16 PROCEDURE — 82550 ASSAY OF CK (CPK): CPT

## 2021-09-16 PROCEDURE — 97535 SELF CARE MNGMENT TRAINING: CPT

## 2021-09-16 PROCEDURE — 86891 AUTOLOGOUS BLOOD OP SALVAGE: CPT

## 2021-09-16 PROCEDURE — 99153 MOD SED SAME PHYS/QHP EA: CPT

## 2021-09-16 PROCEDURE — 86965 POOLING BLOOD PLATELETS: CPT

## 2021-09-16 PROCEDURE — 97164 PT RE-EVAL EST PLAN CARE: CPT

## 2021-09-16 PROCEDURE — U0005: CPT

## 2021-09-16 PROCEDURE — P9016: CPT

## 2021-09-16 PROCEDURE — 97116 GAIT TRAINING THERAPY: CPT

## 2021-09-16 PROCEDURE — 93320 DOPPLER ECHO COMPLETE: CPT

## 2021-09-16 PROCEDURE — 33228 REMV&REPLC PM GEN DUAL LEAD: CPT

## 2021-09-16 PROCEDURE — 93312 ECHO TRANSESOPHAGEAL: CPT

## 2021-09-16 PROCEDURE — 85576 BLOOD PLATELET AGGREGATION: CPT

## 2021-09-16 PROCEDURE — 71045 X-RAY EXAM CHEST 1 VIEW: CPT

## 2021-09-16 PROCEDURE — 84443 ASSAY THYROID STIM HORMONE: CPT

## 2021-09-16 PROCEDURE — C1785: CPT

## 2021-09-16 PROCEDURE — 81001 URINALYSIS AUTO W/SCOPE: CPT

## 2021-09-16 PROCEDURE — 82553 CREATINE MB FRACTION: CPT

## 2021-09-16 PROCEDURE — 86769 SARS-COV-2 COVID-19 ANTIBODY: CPT

## 2021-09-16 PROCEDURE — 97161 PT EVAL LOW COMPLEX 20 MIN: CPT

## 2021-09-16 PROCEDURE — U0003: CPT

## 2021-09-16 PROCEDURE — 88305 TISSUE EXAM BY PATHOLOGIST: CPT

## 2021-09-16 PROCEDURE — 83036 HEMOGLOBIN GLYCOSYLATED A1C: CPT

## 2021-09-16 PROCEDURE — P9012: CPT

## 2021-09-16 PROCEDURE — 71045 X-RAY EXAM CHEST 1 VIEW: CPT | Mod: 26

## 2021-09-16 PROCEDURE — 99285 EMERGENCY DEPT VISIT HI MDM: CPT

## 2021-09-16 PROCEDURE — 84132 ASSAY OF SERUM POTASSIUM: CPT

## 2021-09-16 PROCEDURE — 83605 ASSAY OF LACTIC ACID: CPT

## 2021-09-16 PROCEDURE — 82435 ASSAY OF BLOOD CHLORIDE: CPT

## 2021-09-16 PROCEDURE — 93454 CORONARY ARTERY ANGIO S&I: CPT

## 2021-09-16 PROCEDURE — 87640 STAPH A DNA AMP PROBE: CPT

## 2021-09-16 PROCEDURE — 85025 COMPLETE CBC W/AUTO DIFF WBC: CPT

## 2021-09-16 PROCEDURE — 83735 ASSAY OF MAGNESIUM: CPT

## 2021-09-16 PROCEDURE — 97167 OT EVAL HIGH COMPLEX 60 MIN: CPT

## 2021-09-16 PROCEDURE — 80053 COMPREHEN METABOLIC PANEL: CPT

## 2021-09-16 PROCEDURE — 85610 PROTHROMBIN TIME: CPT

## 2021-09-16 PROCEDURE — 84100 ASSAY OF PHOSPHORUS: CPT

## 2021-09-16 PROCEDURE — 93321 DOPPLER ECHO F-UP/LMTD STD: CPT

## 2021-09-16 PROCEDURE — 86900 BLOOD TYPING SEROLOGIC ABO: CPT

## 2021-09-16 PROCEDURE — 82947 ASSAY GLUCOSE BLOOD QUANT: CPT

## 2021-09-16 PROCEDURE — 84480 ASSAY TRIIODOTHYRONINE (T3): CPT

## 2021-09-16 PROCEDURE — C8929: CPT

## 2021-09-16 PROCEDURE — 94002 VENT MGMT INPAT INIT DAY: CPT

## 2021-09-16 PROCEDURE — 87220 TISSUE EXAM FOR FUNGI: CPT

## 2021-09-16 PROCEDURE — P9037: CPT

## 2021-09-16 PROCEDURE — 80048 BASIC METABOLIC PNL TOTAL CA: CPT

## 2021-09-16 PROCEDURE — 93306 TTE W/DOPPLER COMPLETE: CPT

## 2021-09-16 PROCEDURE — 82330 ASSAY OF CALCIUM: CPT

## 2021-09-16 PROCEDURE — 71046 X-RAY EXAM CHEST 2 VIEWS: CPT

## 2021-09-16 PROCEDURE — 84295 ASSAY OF SERUM SODIUM: CPT

## 2021-09-16 PROCEDURE — P9059: CPT

## 2021-09-16 PROCEDURE — 97110 THERAPEUTIC EXERCISES: CPT

## 2021-09-16 PROCEDURE — 85730 THROMBOPLASTIN TIME PARTIAL: CPT

## 2021-09-16 PROCEDURE — 71250 CT THORAX DX C-: CPT

## 2021-09-16 PROCEDURE — 72110 X-RAY EXAM L-2 SPINE 4/>VWS: CPT

## 2021-09-16 PROCEDURE — 99152 MOD SED SAME PHYS/QHP 5/>YRS: CPT

## 2021-09-16 PROCEDURE — C1894: CPT

## 2021-09-16 PROCEDURE — 94003 VENT MGMT INPAT SUBQ DAY: CPT

## 2021-09-16 PROCEDURE — 85384 FIBRINOGEN ACTIVITY: CPT

## 2021-09-16 PROCEDURE — 83880 ASSAY OF NATRIURETIC PEPTIDE: CPT

## 2021-09-16 PROCEDURE — 82962 GLUCOSE BLOOD TEST: CPT

## 2021-09-16 PROCEDURE — 84436 ASSAY OF TOTAL THYROXINE: CPT

## 2021-09-16 PROCEDURE — 85018 HEMOGLOBIN: CPT

## 2021-09-16 RX ORDER — FOLIC ACID 0.8 MG
1 TABLET ORAL
Qty: 0 | Refills: 0 | DISCHARGE
Start: 2021-09-16

## 2021-09-16 RX ORDER — ESCITALOPRAM OXALATE 10 MG/1
1 TABLET, FILM COATED ORAL
Qty: 0 | Refills: 0 | DISCHARGE
Start: 2021-09-16

## 2021-09-16 RX ORDER — METOPROLOL TARTRATE 50 MG
1 TABLET ORAL
Qty: 0 | Refills: 0 | DISCHARGE

## 2021-09-16 RX ORDER — FUROSEMIDE 40 MG
1 TABLET ORAL
Qty: 0 | Refills: 0 | DISCHARGE

## 2021-09-16 RX ORDER — HYDROCORTISONE 1 %
1 OINTMENT (GRAM) TOPICAL
Qty: 0 | Refills: 0 | DISCHARGE
Start: 2021-09-16

## 2021-09-16 RX ORDER — ACETAMINOPHEN 500 MG
2 TABLET ORAL
Qty: 0 | Refills: 0 | DISCHARGE
Start: 2021-09-16

## 2021-09-16 RX ORDER — POTASSIUM CHLORIDE 20 MEQ
1 PACKET (EA) ORAL
Qty: 0 | Refills: 0 | DISCHARGE

## 2021-09-16 RX ORDER — LOSARTAN POTASSIUM 100 MG/1
1 TABLET, FILM COATED ORAL
Qty: 0 | Refills: 0 | DISCHARGE

## 2021-09-16 RX ORDER — SPIRONOLACTONE 25 MG/1
1 TABLET, FILM COATED ORAL
Qty: 0 | Refills: 0 | DISCHARGE
Start: 2021-09-16

## 2021-09-16 RX ORDER — METOPROLOL TARTRATE 50 MG
1 TABLET ORAL
Qty: 0 | Refills: 0 | DISCHARGE
Start: 2021-09-16

## 2021-09-16 RX ORDER — NYSTATIN CREAM 100000 [USP'U]/G
1 CREAM TOPICAL
Qty: 0 | Refills: 0 | DISCHARGE
Start: 2021-09-16

## 2021-09-16 RX ORDER — FERROUS SULFATE 325(65) MG
1 TABLET ORAL
Qty: 0 | Refills: 0 | DISCHARGE
Start: 2021-09-16

## 2021-09-16 RX ADMIN — Medication 1 APPLICATION(S): at 13:17

## 2021-09-16 RX ADMIN — Medication 81 MILLIGRAM(S): at 11:40

## 2021-09-16 RX ADMIN — PANTOPRAZOLE SODIUM 40 MILLIGRAM(S): 20 TABLET, DELAYED RELEASE ORAL at 06:02

## 2021-09-16 RX ADMIN — Medication 1 MILLIGRAM(S): at 11:41

## 2021-09-16 RX ADMIN — Medication 137 MICROGRAM(S): at 06:02

## 2021-09-16 RX ADMIN — Medication 1 APPLICATION(S): at 06:03

## 2021-09-16 RX ADMIN — Medication 100 MILLIGRAM(S): at 06:02

## 2021-09-16 RX ADMIN — Medication 20 MILLIGRAM(S): at 06:02

## 2021-09-16 RX ADMIN — Medication 325 MILLIGRAM(S): at 11:40

## 2021-09-16 RX ADMIN — SPIRONOLACTONE 25 MILLIGRAM(S): 25 TABLET, FILM COATED ORAL at 06:02

## 2021-09-16 RX ADMIN — Medication 500 MILLIGRAM(S): at 06:02

## 2021-09-16 RX ADMIN — NYSTATIN CREAM 1 APPLICATION(S): 100000 CREAM TOPICAL at 06:02

## 2021-09-16 RX ADMIN — CHLORHEXIDINE GLUCONATE 1 APPLICATION(S): 213 SOLUTION TOPICAL at 11:50

## 2021-09-16 RX ADMIN — ESCITALOPRAM OXALATE 5 MILLIGRAM(S): 10 TABLET, FILM COATED ORAL at 11:40

## 2021-09-16 NOTE — PROGRESS NOTE ADULT - REASON FOR ADMISSION
sob

## 2021-09-16 NOTE — DISCHARGE NOTE NURSING/CASE MANAGEMENT/SOCIAL WORK - PATIENT PORTAL LINK FT
You can access the FollowMyHealth Patient Portal offered by [facility] by registering at the following website: [email or web address]. By joining [employer]’s FollowMyHealth portal, you will also be able to view your health information using other applications (apps) compatible with our system.

## 2021-09-16 NOTE — PROGRESS NOTE ADULT - ASSESSMENT
76  year old male          h/o   , CAD s/p stent on ASA, A-fib/ PPM, , hypothyroid, and total left knee replacement       s/p rash across the upper torso/ back  for past week, pruritic on R arm, and rash in the groin      prior  PPM  interrogation  with  WCT/   s/p  brief   bursts  of  afib/ , on prior visit          admitted with sob,  from acute  diastolic  chf, related  to non compliance  with his  lasix   wound care/  lymphedema,  calra left  leg   *  CAD/ AFIb  /PPM,/ HTN   , stent in 2007   *  h/o  AFIB,  was  not  on  a/c    * Anemia, , hb stable, had  been seen by  gi  eval dr joann sanchez in past    right leg with distal redness.  not cellulitis / c/c  for past few  months  an d left leg lymphedema     xray spine. horse  shoe left kidney/ OA  of  spine  on    lasix,  ha  improved    * Echo, normal  ef/ severe MR and,  ERENDIRA ,  severe  MR    cath, with 2 vessel disease,  s/p  CABG and  MVR  surg d r marni   h/o  Afib on  coumadin  on asa.  lipitor, torpol,  synthroid. toresmide   Ct chest, retrosternal hematoma.  mod left pl effusion   CT surg note  seen,  per   surgeon   ok to  continue  coumadin/  for  afib  follow  daily inr/   cxr with pl effusion/ u/s, atelectasis/  defer management  to ct surg team           f/p wi  dr mikayla dick   ra from: CT Chest No Cont (09.08.21 @ 08:32) >  IMPRESSION:  Left pleural effusion with near complete left lower lobe atelectasis.  6.7 x 2.8 cm retrosternal hematoma. No comment can be made about active extravasation on this noncontrast exam.  These findings were discussed with MARIOLA MCGEE on 9/8/2021 at 2:18 PM by Dr. Morfin with read back confirmation.  --- End of Report ---  < end of copied text >

## 2021-09-16 NOTE — PROGRESS NOTE ADULT - SUBJECTIVE AND OBJECTIVE BOX
afberile  REVIEW OF SYSTEMS:  GEN: no fever,    no chills  RESP: no SOB,   no cough  CVS: no chest pain,   no palpitations  GI: no abdominal pain,   no nausea,   no vomiting,   no constipation,   no diarrhea  : no dysuria,   no frequency  NEURO: no headache,   no dizziness  PSYCH: no depression,   not anxious  Derm : no rash    MEDICATIONS  (STANDING):  ascorbic acid 500 milliGRAM(s) Oral two times a day  aspirin  chewable 81 milliGRAM(s) Oral daily  atorvastatin 40 milliGRAM(s) Oral at bedtime  chlorhexidine 2% Cloths 1 Application(s) Topical <User Schedule>  clotrimazole 1% Cream 1 Application(s) Topical two times a day  escitalopram 5 milliGRAM(s) Oral daily  ferrous    sulfate 325 milliGRAM(s) Oral daily  folic acid 1 milliGRAM(s) Oral daily  hydrocortisone 1% Cream 1 Application(s) Topical daily  levothyroxine 137 MICROGram(s) Oral daily  metoprolol succinate  milliGRAM(s) Oral daily  nystatin Cream 1 Application(s) Topical two times a day  pantoprazole    Tablet 40 milliGRAM(s) Oral before breakfast  polyethylene glycol 3350 17 Gram(s) Oral daily  senna 2 Tablet(s) Oral at bedtime  spironolactone 25 milliGRAM(s) Oral every 12 hours  torsemide 20 milliGRAM(s) Oral daily    MEDICATIONS  (PRN):  acetaminophen   Tablet .. 650 milliGRAM(s) Oral every 6 hours PRN Mild Pain (1 - 3)      Vital Signs Last 24 Hrs  T(C): 36.6 (16 Sep 2021 04:55), Max: 36.9 (15 Sep 2021 14:21)  T(F): 97.9 (16 Sep 2021 04:55), Max: 98.4 (15 Sep 2021 14:21)  HR: 77 (16 Sep 2021 04:55) (69 - 81)  BP: 101/63 (16 Sep 2021 04:55) (96/57 - 102/67)  BP(mean): --  RR: 18 (16 Sep 2021 04:55) (18 - 18)  SpO2: 97% (16 Sep 2021 04:55) (94% - 97%)  CAPILLARY BLOOD GLUCOSE        I&O's Summary    15 Sep 2021 07:01  -  16 Sep 2021 07:00  --------------------------------------------------------  IN: 940 mL / OUT: 2100 mL / NET: -1160 mL        PHYSICAL EXAM:  HEAD:  Atraumatic, Normocephalic  NECK: Supple, No   JVD  CHEST/LUNG:   no     rales,     no,    rhonchi  HEART: Regular rate and rhythm;         murmur  ABDOMEN: Soft, Nontender, ;   EXTREMITIES:        edema  NEUROLOGY:  alert    LABS:                        8.7    5.60  )-----------( 250      ( 16 Sep 2021 05:11 )             27.4     09-16    135  |  96  |  30<H>  ----------------------------<  114<H>  3.9   |  27  |  1.15    Ca    9.2      16 Sep 2021 05:11    TPro  5.9<L>  /  Alb  3.0<L>  /  TBili  0.6  /  DBili  x   /  AST  12  /  ALT  9<L>  /  AlkPhos  108  09-15    PT/INR - ( 16 Sep 2021 05:11 )   PT: 27.5 sec;   INR: 2.39 ratio         PTT - ( 16 Sep 2021 05:11 )  PTT:35.9 sec                Thyroid Stimulating Hormone, Serum: 5.02 uIU/mL (08-25 @ 22:25)          Consultant(s) Notes Reviewed:      Care Discussed with Consultants/Other Providers:

## 2021-09-16 NOTE — PROGRESS NOTE ADULT - SUBJECTIVE AND OBJECTIVE BOX
INTERVAL HPI/OVERNIGHT EVENTS:  pt seen and examined, doing well, no complaints   hgb remains stable        MEDICATIONS  (PRN):      Allergies    alfuzosin (Hives)  levofloxacin (Hives)  Myrbetriq (Hives)  tamsulosin (Hives)    Intolerances      ROS   unable to obtain         PHYSICAL EXAM:   Vital Signs:  Vital Signs Last 24 Hrs  T(C): 37.1 (29 Aug 2021 04:21), Max: 37.1 (29 Aug 2021 04:21)  T(F): 98.7 (29 Aug 2021 04:21), Max: 98.7 (29 Aug 2021 04:21)  HR: 55 (29 Aug 2021 04:21) (55 - 78)  BP: 116/61 (29 Aug 2021 04:21) (116/61 - 146/63)  BP(mean): --  RR: 18 (29 Aug 2021 04:21) (18 - 18)  SpO2: 93% (29 Aug 2021 04:21) (93% - 96%)  Daily     Daily Weight in k.2 (29 Aug 2021 07:30)I&O's Summary    28 Aug 2021 07:  -  29 Aug 2021 07:00  --------------------------------------------------------  IN: 1110 mL / OUT: 5 mL / NET: -915 mL    29 Aug 2021 07:  -  29 Aug 2021 10:27  --------------------------------------------------------  IN: 240 mL / OUT: 800 mL / NET: -560 mL        GENERAL:  Appears stated age, well-groomed, well-nourished, no distress  HEENT:  NC/AT,  conjunctivae clear and pink, no thyromegaly, nodules, adenopathy, no JVD, sclera -anicteric  CHEST:  Full & symmetric excursion, no increased effort, breath sounds clear  HEART:  Regular rhythm, S1, S2, no murmur/rub/S3/S4, no abdominal bruit, no edema  ABDOMEN:  Soft, non-tender, non-distended, normoactive bowel sounds,  no masses ,no hepato-splenomegaly, no signs of chronic liver disease  EXTEREMITIES:  no cyanosis,clubbing or edema  SKIN:  No rash/erythema/ecchymoses/petechiae/wounds/abscess/warm/dry  NEURO:  Alert, oriented, no asterixis, no tremor, no encephalopathy      LABS:                        11.5   5.65  )-----------( 138      ( 29 Aug 2021 06:30 )             36.1         139  |  105  |  44<H>  ----------------------------<  113<H>  4.5   |  21<L>  |  1.06    Ca    9.8      29 Aug 2021 06:30          amylase   lipase  RADIOLOGY & ADDITIONAL TESTS:

## 2021-09-16 NOTE — PROGRESS NOTE ADULT - ASSESSMENT
anemia  afib  cad    s/p CABG and MVR 8/30  no overt gi bleed  denies melena/hematochezia  hgb stable  had egd/colon 12/2020 (cher metha)  cbc daily  transfuse PRN  s/p PPM generator change   cardio following   CXR noted   dc planning as per primary   will follow   dw patient     Advanced care planning was discussed with patient and family.  Advanced care planning forms were reviewed and discussed.  Risks, benefits and alternatives of gastroenterologic procedures were discussed in detail and all questions were answered.    30 minutes spent.

## 2021-09-17 ENCOUNTER — TRANSCRIPTION ENCOUNTER (OUTPATIENT)
Age: 76
End: 2021-09-17

## 2021-09-20 ENCOUNTER — TRANSCRIPTION ENCOUNTER (OUTPATIENT)
Age: 76
End: 2021-09-20

## 2021-09-22 LAB
CULTURE RESULTS: SIGNIFICANT CHANGE UP
SPECIMEN SOURCE: SIGNIFICANT CHANGE UP

## 2021-09-24 ENCOUNTER — TRANSCRIPTION ENCOUNTER (OUTPATIENT)
Age: 76
End: 2021-09-24

## 2021-09-27 ENCOUNTER — APPOINTMENT (OUTPATIENT)
Dept: OPHTHALMOLOGY | Facility: CLINIC | Age: 76
End: 2021-09-27

## 2021-09-30 ENCOUNTER — TRANSCRIPTION ENCOUNTER (OUTPATIENT)
Age: 76
End: 2021-09-30

## 2021-10-04 ENCOUNTER — TRANSCRIPTION ENCOUNTER (OUTPATIENT)
Age: 76
End: 2021-10-04

## 2021-10-06 ENCOUNTER — TRANSCRIPTION ENCOUNTER (OUTPATIENT)
Age: 76
End: 2021-10-06

## 2021-10-15 ENCOUNTER — TRANSCRIPTION ENCOUNTER (OUTPATIENT)
Age: 76
End: 2021-10-15

## 2021-10-25 RX ORDER — AMOXICILLIN AND CLAVULANATE POTASSIUM 500; 125 MG/1; MG/1
500-125 TABLET, FILM COATED ORAL TWICE DAILY
Qty: 20 | Refills: 0 | Status: COMPLETED | COMMUNITY
Start: 2021-04-27 | End: 2021-10-25

## 2021-10-25 RX ORDER — NYSTATIN 100000 [USP'U]/G
100000 CREAM TOPICAL TWICE DAILY
Refills: 0 | Status: ACTIVE | COMMUNITY

## 2021-10-25 RX ORDER — CEFUROXIME AXETIL 500 MG/1
500 TABLET ORAL
Refills: 0 | Status: COMPLETED | COMMUNITY
End: 2021-10-25

## 2021-10-25 RX ORDER — CHOLECALCIFEROL (VITAMIN D3) 50 MCG
50 MCG TABLET ORAL
Qty: 30 | Refills: 0 | Status: ACTIVE | COMMUNITY

## 2021-10-25 RX ORDER — FOLIC ACID 1 MG/1
1 TABLET ORAL
Qty: 30 | Refills: 3 | Status: ACTIVE | COMMUNITY

## 2021-10-25 RX ORDER — ACETAMINOPHEN 325 MG/1
325 TABLET ORAL EVERY 6 HOURS
Qty: 240 | Refills: 0 | Status: ACTIVE | COMMUNITY

## 2021-10-25 RX ORDER — CLOTRIMAZOLE 10 MG/G
1 CREAM TOPICAL TWICE DAILY
Qty: 1 | Refills: 0 | Status: ACTIVE | COMMUNITY

## 2021-10-25 RX ORDER — ESCITALOPRAM OXALATE 5 MG/1
5 TABLET, FILM COATED ORAL DAILY
Refills: 0 | Status: ACTIVE | COMMUNITY

## 2021-10-25 RX ORDER — FLUCONAZOLE 100 MG/1
100 TABLET ORAL DAILY
Qty: 5 | Refills: 1 | Status: COMPLETED | COMMUNITY
Start: 2020-06-04 | End: 2021-10-25

## 2021-10-25 RX ORDER — LEVOFLOXACIN 500 MG/1
500 TABLET, FILM COATED ORAL DAILY
Qty: 10 | Refills: 0 | Status: COMPLETED | COMMUNITY
Start: 2020-05-07 | End: 2021-10-25

## 2021-10-25 RX ORDER — HYDROCORTISONE 10 MG/G
1 CREAM TOPICAL
Qty: 1 | Refills: 0 | Status: ACTIVE | COMMUNITY

## 2021-10-25 RX ORDER — CHLORHEXIDINE GLUCONATE 4 %
325 (65 FE) LIQUID (ML) TOPICAL DAILY
Qty: 90 | Refills: 0 | Status: ACTIVE | COMMUNITY

## 2021-10-25 RX ORDER — METHYLPREDNISOLONE 4 MG/1
4 TABLET ORAL
Refills: 0 | Status: COMPLETED | COMMUNITY
End: 2021-10-25

## 2021-10-25 RX ORDER — MULTIVIT-MIN/IRON/FOLIC ACID/K 18-600-40
500 CAPSULE ORAL
Qty: 90 | Refills: 0 | Status: ACTIVE | COMMUNITY

## 2021-10-25 RX ORDER — FEXOFENADINE HYDROCHLORIDE 180 MG/1
180 TABLET, FILM COATED ORAL
Refills: 0 | Status: COMPLETED | COMMUNITY
End: 2021-10-25

## 2021-10-27 ENCOUNTER — APPOINTMENT (OUTPATIENT)
Dept: CARDIOTHORACIC SURGERY | Facility: CLINIC | Age: 76
End: 2021-10-27
Payer: MEDICARE

## 2021-10-27 VITALS
RESPIRATION RATE: 16 BRPM | WEIGHT: 200 LBS | DIASTOLIC BLOOD PRESSURE: 54 MMHG | BODY MASS INDEX: 31.39 KG/M2 | SYSTOLIC BLOOD PRESSURE: 92 MMHG | OXYGEN SATURATION: 96 % | HEIGHT: 67 IN | TEMPERATURE: 97.8 F | HEART RATE: 66 BPM

## 2021-10-27 DIAGNOSIS — B60.00 BABESIOSIS, UNSPECIFIED: ICD-10-CM

## 2021-10-27 PROCEDURE — 99024 POSTOP FOLLOW-UP VISIT: CPT

## 2021-10-27 RX ORDER — POTASSIUM CHLORIDE 1500 MG/1
20 TABLET, FILM COATED, EXTENDED RELEASE ORAL EVERY OTHER DAY
Refills: 0 | Status: ACTIVE | COMMUNITY
Start: 2021-10-27

## 2021-10-27 RX ORDER — CILOSTAZOL 100 MG/1
100 TABLET ORAL
Refills: 2 | Status: ACTIVE | COMMUNITY
Start: 2021-10-27

## 2021-10-27 RX ORDER — AMMONIUM LACTATE 12 %
12 CREAM (GRAM) TOPICAL TWICE DAILY
Qty: 1 | Refills: 5 | Status: ACTIVE | COMMUNITY
Start: 2021-10-27

## 2021-10-28 ENCOUNTER — RESULT REVIEW (OUTPATIENT)
Age: 76
End: 2021-10-28

## 2021-10-28 ENCOUNTER — OUTPATIENT (OUTPATIENT)
Dept: OUTPATIENT SERVICES | Facility: HOSPITAL | Age: 76
LOS: 1 days | End: 2021-10-28
Payer: MEDICARE

## 2021-10-28 ENCOUNTER — APPOINTMENT (OUTPATIENT)
Dept: ULTRASOUND IMAGING | Facility: HOSPITAL | Age: 76
End: 2021-10-28
Payer: MEDICARE

## 2021-10-28 DIAGNOSIS — Z98.890 OTHER SPECIFIED POSTPROCEDURAL STATES: Chronic | ICD-10-CM

## 2021-10-28 DIAGNOSIS — Z95.0 PRESENCE OF CARDIAC PACEMAKER: Chronic | ICD-10-CM

## 2021-10-28 DIAGNOSIS — Z96.652 PRESENCE OF LEFT ARTIFICIAL KNEE JOINT: Chronic | ICD-10-CM

## 2021-10-28 DIAGNOSIS — Z98.49 CATARACT EXTRACTION STATUS, UNSPECIFIED EYE: Chronic | ICD-10-CM

## 2021-10-28 DIAGNOSIS — Z95.2 PRESENCE OF PROSTHETIC HEART VALVE: ICD-10-CM

## 2021-10-28 DIAGNOSIS — Z95.5 PRESENCE OF CORONARY ANGIOPLASTY IMPLANT AND GRAFT: Chronic | ICD-10-CM

## 2021-10-28 DIAGNOSIS — Z98.890 OTHER SPECIFIED POSTPROCEDURAL STATES: ICD-10-CM

## 2021-10-28 LAB — B MICROTI DNA BLD QL NAA+PROBE: NORMAL

## 2021-10-28 PROCEDURE — 93970 EXTREMITY STUDY: CPT | Mod: 26

## 2021-10-28 PROCEDURE — 93970 EXTREMITY STUDY: CPT

## 2021-10-29 LAB
BABESIA ANTIBODIES, IGG: NORMAL
BABESIA ANTIBODIES, IGM: NORMAL

## 2021-11-29 ENCOUNTER — APPOINTMENT (OUTPATIENT)
Dept: WOUND CARE | Facility: CLINIC | Age: 76
End: 2021-11-29
Payer: MEDICARE

## 2021-11-29 DIAGNOSIS — L30.9 DERMATITIS, UNSPECIFIED: ICD-10-CM

## 2021-11-29 DIAGNOSIS — I87.2 VENOUS INSUFFICIENCY (CHRONIC) (PERIPHERAL): ICD-10-CM

## 2021-11-29 DIAGNOSIS — L08.1 ERYTHRASMA: ICD-10-CM

## 2021-11-29 DIAGNOSIS — M79.89 OTHER SPECIFIED SOFT TISSUE DISORDERS: ICD-10-CM

## 2021-11-29 PROCEDURE — 99213 OFFICE O/P EST LOW 20 MIN: CPT

## 2021-11-29 RX ORDER — BETAMETHASONE DIPROPIONATE 0.5 MG/G
0.05 CREAM TOPICAL DAILY
Qty: 1 | Refills: 0 | Status: ACTIVE | COMMUNITY
Start: 2021-11-29 | End: 1900-01-01

## 2021-11-29 NOTE — DISCHARGE NOTE PROVIDER - NSFOLLOWUPCLINICS_GEN_ALL_ED_FT
Addended by: CHRISTY STARR on: 11/29/2021 05:44 PM     Modules accepted: Orders    
[facility]  Dermatology  [address], [address]  [place] [address]  Phone: [phone]  Fax:   Follow Up Time:

## 2021-12-06 NOTE — HISTORY OF PRESENT ILLNESS
[FreeTextEntry1] : Pt was last seen by Dr. VIEYRA over a year ago for right foot interspace wounds, scabs and drainage. He recently had open heart surgery on august 8th and his visiting nurse suggested she visit the podiatrist or wound care for check up. Pt denies f/c/v/n/sob. Pt states that he has a lymphedema specialist but has not seen him recently. he ambulates on diabetic extra depth shoes with a lift on the left shoe.

## 2021-12-06 NOTE — PLAN
[FreeTextEntry1] : 75M w/ history of right ankle venous dermatitis and interdigital maceration in all interspaces \par - Pt seen and evaluated \par - No open lesions, macerations noted. Right ankle anterior and dorsal foot excoriation noted 2/2 eczema\par - Continue lymphedema control therapy with therapist/Dr. Lopez\par - Rx: betamethasone\par - Patient to apply betamethasone to right foot daily \par - RTC PRN

## 2022-01-21 PROBLEM — Z09 POSTOPERATIVE FOLLOW-UP: Status: ACTIVE | Noted: 2021-10-25

## 2022-01-27 ENCOUNTER — APPOINTMENT (OUTPATIENT)
Dept: CARDIOTHORACIC SURGERY | Facility: CLINIC | Age: 77
End: 2022-01-27
Payer: MEDICARE

## 2022-01-27 DIAGNOSIS — Z09 ENCOUNTER FOR FOLLOW-UP EXAMINATION AFTER COMPLETED TREATMENT FOR CONDITIONS OTHER THAN MALIGNANT NEOPLASM: ICD-10-CM

## 2022-01-31 PROBLEM — Z95.2 S/P MITRAL VALVE REPLACEMENT: Status: ACTIVE | Noted: 2021-10-25

## 2022-01-31 PROBLEM — Z98.890 S/P LEFT ATRIAL APPENDAGE LIGATION: Status: ACTIVE | Noted: 2021-10-25

## 2022-01-31 PROBLEM — Z95.1 S/P CABG X 2: Status: ACTIVE | Noted: 2021-10-25

## 2022-02-07 ENCOUNTER — APPOINTMENT (OUTPATIENT)
Dept: CARDIOTHORACIC SURGERY | Facility: CLINIC | Age: 77
End: 2022-02-07
Payer: MEDICARE

## 2022-02-07 VITALS — BODY MASS INDEX: 35.4 KG/M2 | WEIGHT: 226 LBS

## 2022-02-07 VITALS
RESPIRATION RATE: 16 BRPM | DIASTOLIC BLOOD PRESSURE: 66 MMHG | TEMPERATURE: 97.4 F | SYSTOLIC BLOOD PRESSURE: 125 MMHG | HEIGHT: 67 IN | HEART RATE: 80 BPM | OXYGEN SATURATION: 94 %

## 2022-02-07 DIAGNOSIS — Z98.890 OTHER SPECIFIED POSTPROCEDURAL STATES: ICD-10-CM

## 2022-02-07 DIAGNOSIS — Z95.2 PRESENCE OF PROSTHETIC HEART VALVE: ICD-10-CM

## 2022-02-07 DIAGNOSIS — Z95.1 PRESENCE OF AORTOCORONARY BYPASS GRAFT: ICD-10-CM

## 2022-02-07 PROCEDURE — 99214 OFFICE O/P EST MOD 30 MIN: CPT

## 2022-02-07 RX ORDER — TORSEMIDE 20 MG/1
20 TABLET ORAL EVERY OTHER DAY
Qty: 4 | Refills: 0 | Status: COMPLETED | COMMUNITY
End: 2022-02-07

## 2022-02-07 RX ORDER — SPIRONOLACTONE 25 MG/1
25 TABLET ORAL EVERY OTHER DAY
Qty: 4 | Refills: 0 | Status: COMPLETED | COMMUNITY
End: 2022-02-07

## 2022-02-07 RX ORDER — LOSARTAN POTASSIUM 50 MG/1
50 TABLET, FILM COATED ORAL
Qty: 30 | Refills: 3 | Status: ACTIVE | COMMUNITY
Start: 2022-02-07

## 2022-02-07 RX ORDER — WARFARIN SODIUM 4 MG/1
4 TABLET ORAL DAILY
Refills: 0 | Status: COMPLETED | COMMUNITY
End: 2022-02-07

## 2022-02-07 RX ORDER — METOPROLOL SUCCINATE 50 MG/1
50 TABLET, EXTENDED RELEASE ORAL
Qty: 30 | Refills: 2 | Status: ACTIVE | COMMUNITY

## 2022-02-07 RX ORDER — RIVAROXABAN 15 MG/1
15 TABLET, FILM COATED ORAL
Qty: 90 | Refills: 0 | Status: ACTIVE | COMMUNITY
Start: 2022-02-07 | End: 1900-01-01

## 2022-02-07 RX ORDER — FUROSEMIDE 20 MG/1
20 TABLET ORAL
Refills: 0 | Status: ACTIVE | COMMUNITY
Start: 2022-02-07

## 2022-02-07 RX ORDER — SENNOSIDES 8.6 MG TABLETS 8.6 MG/1
8.6 TABLET ORAL
Qty: 60 | Refills: 0 | Status: COMPLETED | COMMUNITY
Start: 2021-10-27 | End: 2022-02-07

## 2022-02-28 NOTE — HISTORY OF PRESENT ILLNESS
[FreeTextEntry1] : This is a 76 year old male with past medical history of Hypothyroidism, Hypercholesterolemia, BPH, Heart murmur, Mitral regurgitation and CAD. He is currently  status post Mitral valve replacement using 33 mm Nielsen Perimount bio prosthetic mitral valve, Closure of ANAHI with 45 mm atriclip, CABG X 2 ( LIMA to Diag, SVG to OM) on 8/30/21. Post op course significant with rapid afib, 9/7 Pacemaker generator changed.Anticoagulation for afib and MVR.  He is here for 3 months follow up with TTE. \par \par This visit, he complaints of wheezing when he wakes up for 1/2 - 1 hour and goes away by itself  for the past 2 months , Lt thigh edema is getting progressively worse since Nov 2021. He is also using the ACE bandage for lymphedema. He also reports occasional diarrhoea and frequent stools and pain to LT sided chest pain . Denies any shortness of breath, dizziness, palpitations or syncope.  \par \par \par \par Dr. Mejía ( lymphedema ) \par

## 2022-02-28 NOTE — CONSULT LETTER
[FreeTextEntry2] : Dr. Roni Lainez [FreeTextEntry1] : I had the pleasure of seeing your patient, JANEE ARCEO, in my office today. \par \par We take a multidisciplinary team approach to patient care and consider you, the referring physician, an extension of our team. We will maintain an open line of communication with you throughout your patient's treatment course.  \par \par As you recall, he is a 76 year old male status post Mitral valve replacement using 33 mm Nielsen Perimount bio prosthetic mitral valve, Closure of ANAHI with 45 mm atriclip, CABG X 2 ( LIMA to Diag, SVG to OM) on 8/30/21 .The patient presents to the office today for a routine follow up visit with repeat diagnostic imaging . I have enclosed a copy for your records.\par \par 1/31/22 TTE revealed Mild aortic insufficiency, There is a bio prosthetic mitral valve with mild regurgitation. There is no significant stenosis, with calculated valve area of 1.4 sq cm and a mean gradient of 4 mm hg. Mild tricuspid regurgitation. Dilated LA and LV. \par \par Today on exam patient's lungs clear bilaterally, normal sinus rhythm, sternum stable, incision clean, dry and intact. LT SVG site is clean, dry and intact.  +2  peripheral edema noted L> R .+Lymphedema. Follow up with  ( Vascular)  Therefore, I have recommended that the patient to follow up with Cardiologist and PCP. \par \par I appreciate the opportunity to care for your patient at [facility] . If there are any questions or concerns, please call me at [phone]. \par \par Please see my note below. \par \par Sincerely, \par \par \par \par \par Raúl Daniel MD\par Director\par [facility]\par Professor \par Cardiovascular & Thoracic Surgery\par [facility] [employer].\par \par \par [facility]:\par Department of Cardiovascular and Thoracic Surgery\[address], [address]\par Office: [phone]\par Fax: [phone]\par \par Vida Gilbert\par  \[facility] Department of Cardiovascular and Thoracic Surgery\[address], [address]\par Phone: [phone]\par Office: [phone]\par \par

## 2022-02-28 NOTE — DATA REVIEWED
[FreeTextEntry1] : 1/31/22 TTE revealed Mild aortic insufficiency, There is a bio prosthetic mitral valve with mild regurgitation. There is no significant stenosis, with calculated valve area of 1.4 sq cm and a mean gradient of 4 mm hg. Mild tricuspid regurgitation. Dilated LA and LV. \par \par 9/14/21 TTE revealed Bioprosthetic mitral valve replacement. Patient in atrial fibrillation; ejection fraction varies with R-R interval. Endocardial visualization enhanced with intravenous injection of Ultrasonic Enhancing Agent (Definity). Grossly normal left ventricular systolic function. Septal motion consistent with prior cardiac surgery.Right ventricle not well visualized; right ventricle appears enlarged with decreased right ventricular systolic function.  A device wire is noted in the right heart. Thickened pericardium. Small, organized pericardial effusion predominantly seen anterior to the right ventricle. The effusion measures up to approximately 1.0 cm anterior to the right ventricle. Subcostal images are very limited. No obvious echocardiographic evidence of pericardial tamponade.\par

## 2022-02-28 NOTE — PHYSICAL EXAM
[Sclera] : the sclera and conjunctiva were normal [PERRL With Normal Accommodation] : pupils were equal in size, round, and reactive to light [Neck Appearance] : the appearance of the neck was normal [] : no respiratory distress [Respiration, Rhythm And Depth] : normal respiratory rhythm and effort [Apical Impulse] : the apical impulse was normal [Heart Rate And Rhythm] : heart rate was normal and rhythm regular [Heart Sounds] : normal S1 and S2 [Murmurs] : no murmurs [Examination Of The Chest] : the chest was normal in appearance [2+] : left 2+ [Breast Appearance] : normal in appearance [Bowel Sounds] : normal bowel sounds [Abdomen Soft] : soft [No CVA Tenderness] : no ~M costovertebral angle tenderness [Involuntary Movements] : no involuntary movements were seen [Skin Color & Pigmentation] : normal skin color and pigmentation [No Focal Deficits] : no focal deficits [Oriented To Time, Place, And Person] : oriented to person, place, and time [Impaired Insight] : insight and judgment were intact [Affect] : the affect was normal [Mood] : the mood was normal [Memory Recent] : recent memory was not impaired [Memory Remote] : remote memory was not impaired [FreeTextEntry1] : Uses cane/walker

## 2022-02-28 NOTE — END OF VISIT
[FreeTextEntry3] : \par I personally scribed for PARISH KATZ on Feb 7 2022  2:00PM . \par \par \par \par \par Physician Attestation:\par Documented by CHERELLE POTTER acting as a scribe for PARISH KATZ 02/07/2022 . \par                 All medical record entries made by the Scribe were at my, PARISH KATZ , direction and personally dictated by me on 02/07/2022 . I have reviewed the chart and agree that the record accurately reflects my personal performance of the history, physical exam, assessment and plan\par \par

## 2022-02-28 NOTE — ASSESSMENT
[FreeTextEntry1] : This is a 76 year old male with past medical history of Hypothyroidism, Hypercholesterolemia, BPH, Heart murmur, Mitral regurgitation and CAD. He is currently  status post Mitral valve replacement using 33 mm Nielsen Perimount bio prosthetic mitral valve, Closure of ANAHI with 45 mm atriclip, CABG X 2 ( LIMA to Diag, SVG to OM) on 8/30/21. Post op course significant with rapid afib, 9/7 Pacemaker generator changed.Anticoagulation for afib and MVR.  He is here for 3 months follow up with TTE. \par \par This visit, he complaints of wheezing when he wakes up for 1/2 - 1 hour and goes away by itself  for the past 2 months , Lt thigh edema is getting progressively worse since Nov 2021. He is also using the ACE bandage for lymphedema. He also reports occasional diarrhoea and frequent stools and pain to LT sided chest pain . Denies any shortness of breath, dizziness, palpitations or syncope.  \par \par  reviewed the TTE and explained to patient and friend.1/31/22 TTE revealed Mild aortic insufficiency, There is a bio prosthetic mitral valve with mild regurgitation. There is no significant stenosis, with calculated valve area of 1.4 sq cm and a mean gradient of 4 mm hg. Mild tricuspid regurgitation. Dilated LA and LV. \par \par Today on exam patient's lungs clear bilaterally, normal sinus rhythm, sternum stable, incision clean, dry and intact. LT SVG site is clean, dry and intact.  +2  peripheral edema noted L> R .+Lymphedema. \par \par Plan:\par 1) Continue current medication regimen\par 2) Follow up with cardiologist ( ) and PCP \par 3) May return on as needed basis \par 4) Follow up with Dr. Vicente ( Vascular ) for LT leg lymphedema \par 5) SBE antibiotic prophylaxis discussed at length \par 6) Continue to increase activity and walk daily as tolerated. Continue to use incentive spirometer. \par 7) Keep legs elevated above heart when resting/sitting/sleeping.\par 8) Discontinue Coumadin and start Xarelto 15 mg daily \par

## 2022-03-14 ENCOUNTER — APPOINTMENT (OUTPATIENT)
Dept: OPHTHALMOLOGY | Facility: CLINIC | Age: 77
End: 2022-03-14
Payer: MEDICARE

## 2022-03-14 ENCOUNTER — NON-APPOINTMENT (OUTPATIENT)
Age: 77
End: 2022-03-14

## 2022-03-14 PROCEDURE — 92014 COMPRE OPH EXAM EST PT 1/>: CPT

## 2022-03-14 PROCEDURE — 92202 OPSCPY EXTND ON/MAC DRAW: CPT

## 2022-03-17 ENCOUNTER — APPOINTMENT (OUTPATIENT)
Dept: VASCULAR SURGERY | Facility: CLINIC | Age: 77
End: 2022-03-17
Payer: MEDICARE

## 2022-03-17 VITALS
HEART RATE: 45 BPM | HEIGHT: 67 IN | BODY MASS INDEX: 35.47 KG/M2 | SYSTOLIC BLOOD PRESSURE: 109 MMHG | WEIGHT: 226 LBS | DIASTOLIC BLOOD PRESSURE: 56 MMHG

## 2022-03-17 PROCEDURE — 99213 OFFICE O/P EST LOW 20 MIN: CPT

## 2022-03-17 PROCEDURE — 93970 EXTREMITY STUDY: CPT

## 2022-03-18 NOTE — HISTORY OF PRESENT ILLNESS
[FreeTextEntry1] : 75 yo male with a history of cad s/p cabg, ppm, afib, multiple orthopedic surgeries of the left lower extremity and vein strippings in the past resulting in lymphedema and multiple episodes of lower extremity cellulitis, hypothyroidism, presents for evaluation of lower extremity edema.  \par pt unable to bend the left knee \par pt denies any history of dvt, ulcer or wounds.  pt has been using ketoconazole and topical steroid as well \par pt states that the edema started to get worse after his recent open heart surgery.  pt had right gsv harvested for his cabg.  \par pt is unable to bed the left lower extremity 2/2 fusion so has been unable to apply compression stockings to the left lower extremity \par

## 2022-03-18 NOTE — PHYSICAL EXAM
[2+] : left 2+ [Ankle Swelling (On Exam)] : present [Ankle Swelling Bilaterally] : severe [] : of the right leg [Ankle Swelling On The Right] : mild [Alert] : alert [Calm] : calm [JVD] : no jugular venous distention  [Varicose Veins Of Lower Extremities] : not present [de-identified] : appears well  [de-identified] : mild calf tenderness\par s/p multiple orthopedic surgeries with shortened left lower extremity  [de-identified] : intact, no open wounds no weeping, hyperkeratosis is noted b/l lower extremities

## 2022-03-18 NOTE — ASSESSMENT
[FreeTextEntry1] : 75 yo male with a history of cad s/p cabg, ppm, afib, multiple orthopedic surgeries of the left lower extremity and vein stripping in the past resulting in lymphedema and multiple episodes of lower extremity cellulitis, hypothyroidism, presents for evaluation of lower extremity edema.  \par \par venous duplex shows no evidence of dvt, chronic svt of the right gsv in the proximal thigh and many branches to the left gsv with pulsatile waveforms to the deep venous system \par pt unable to bend the leg to apply compression stockings \par will refer to the lymphedema center for treatment and for assessment of customized wraps for the legs \par if no improvement will likely be a good candidate for possible lymphedema pump \par

## 2022-03-22 ENCOUNTER — APPOINTMENT (OUTPATIENT)
Dept: OPHTHALMOLOGY | Facility: CLINIC | Age: 77
End: 2022-03-22

## 2022-04-15 ENCOUNTER — APPOINTMENT (OUTPATIENT)
Dept: OPHTHALMOLOGY | Facility: EYE CENTER | Age: 77
End: 2022-04-15

## 2022-04-16 ENCOUNTER — APPOINTMENT (OUTPATIENT)
Dept: OPHTHALMOLOGY | Facility: CLINIC | Age: 77
End: 2022-04-16

## 2022-05-03 ENCOUNTER — APPOINTMENT (OUTPATIENT)
Dept: SURGERY | Facility: CLINIC | Age: 77
End: 2022-05-03
Payer: MEDICARE

## 2022-05-24 ENCOUNTER — APPOINTMENT (OUTPATIENT)
Dept: SURGERY | Facility: CLINIC | Age: 77
End: 2022-05-24
Payer: MEDICARE

## 2022-05-24 VITALS
BODY MASS INDEX: 38.57 KG/M2 | DIASTOLIC BLOOD PRESSURE: 79 MMHG | WEIGHT: 240 LBS | HEART RATE: 51 BPM | SYSTOLIC BLOOD PRESSURE: 156 MMHG | OXYGEN SATURATION: 95 % | TEMPERATURE: 97.1 F | RESPIRATION RATE: 18 BRPM | HEIGHT: 66 IN

## 2022-05-24 PROCEDURE — 46600 DIAGNOSTIC ANOSCOPY SPX: CPT

## 2022-05-24 PROCEDURE — 99203 OFFICE O/P NEW LOW 30 MIN: CPT | Mod: 25

## 2022-05-24 NOTE — PHYSICAL EXAM
[Normal Breath Sounds] : Normal breath sounds [Normal Heart Sounds] : normal heart sounds [No Rash or Lesion] : No rash or lesion [Alert] : alert [Oriented to Person] : oriented to person [Oriented to Place] : oriented to place [Oriented to Time] : oriented to time [Calm] : calm [Abdomen Masses] : No abdominal masses [Abdomen Tenderness] : ~T No ~M abdominal tenderness [No HSM] : no hepatosplenomegaly [JVD] : no jugular venous distention  [de-identified] : Perianal inspection and digital rectal examination are normal.  Anoscopy reveals enlarged friable circumferential internal hemorrhoids with evidence of recent bleeding.  Distal rectal mucosa is otherwise normal.  There is no palpable mass. [de-identified] : WNL [de-identified] : WNL [de-identified] : ROM WNL

## 2022-05-24 NOTE — CONSULT LETTER
[Dear  ___] : Dear  [unfilled], [Consult Letter:] : I had the pleasure of evaluating your patient, [unfilled]. [Please see my note below.] : Please see my note below. [Consult Closing:] : Thank you very much for allowing me to participate in the care of this patient.  If you have any questions, please do not hesitate to contact me. [Sincerely,] : Sincerely, [FreeTextEntry3] : Cesar Torres M.D., F.A.C.S, F.A.S.C.R.S [DrJeri  ___] : Dr. CHARLES

## 2022-05-24 NOTE — HISTORY OF PRESENT ILLNESS
[FreeTextEntry1] : Liam is a 76 year old male here for consultation visit.\par \par 1/20/21 Colonoscopy - The examined portion of the ileum was normal. One polyp in the transverse colon, removed with a hot snare, resected and retrieved. One polyp in the sigmoid colon, removed with a hot biopsy forceps, resected and retrieved. Diverticulosis in the sigmoid colon. Stool in the ascending colon and in the cecum. Internal hemorrhoids.\par \par Today patient reports feeling well. Reports having excess tissue and swelling with BMs for several years. More recently he has had prolapsing tissue with every BM. Has bleeding dripping into the bowl with most BMs. 1-2 formed BM daily, reports frequent straining.  Denies family history of colorectal cancer. Currently on 81mg ASA  and Xarelto for history of cardiac stent in 2007 and pacemaker in 2012 and 2021.  He also has a history of heart valve replacement.

## 2022-05-24 NOTE — ASSESSMENT
[FreeTextEntry1] : In summary the patient has bleeding internal hemorrhoids.  He takes Xarelto and aspirin.  I explained that if his Xarelto and aspirin can be held for 2 days prior to the procedure and for a week afterward I would recommend rubber band ligations in the office.  If his anticoagulation cannot be held for this length of time I would recommend interventional radiology internal hemorrhoid embolization.

## 2022-06-06 NOTE — ASU PREOP CHECKLIST - 4.
Patient is wondering if he could have labs ordered prior to his visit with Dr. 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

## 2022-06-21 ENCOUNTER — APPOINTMENT (OUTPATIENT)
Dept: VASCULAR SURGERY | Facility: CLINIC | Age: 77
End: 2022-06-21
Payer: MEDICARE

## 2022-06-21 PROCEDURE — 99213 OFFICE O/P EST LOW 20 MIN: CPT

## 2022-06-21 RX ORDER — AMOXICILLIN AND CLAVULANATE POTASSIUM 500; 125 MG/1; MG/1
500-125 TABLET, FILM COATED ORAL
Qty: 20 | Refills: 0 | Status: ACTIVE | COMMUNITY
Start: 2022-06-21 | End: 1900-01-01

## 2022-06-21 NOTE — PHYSICAL EXAM
[Ankle Swelling (On Exam)] : present [Ankle Swelling Bilaterally] : severe [Alert] : alert [Calm] : calm [JVD] : no jugular venous distention  [Varicose Veins Of Lower Extremities] : not present [] : not present [Skin Ulcer] : no ulcer [de-identified] : appears well [de-identified] : intact, mild blanching erythema and tenderness over the right thigh with warmth noted on exam

## 2022-06-21 NOTE — HISTORY OF PRESENT ILLNESS
[FreeTextEntry1] : 76 yo male with a history of cad s/p CABG, ppm, afib, multiple orthopedic surgeries of the left lower extremity and vein stripping in the past resulting in lymphedema and multiple episodes of lower extremity cellulitis, hypothyroidism, presents for follow up.  pt reports pain over the right thigh over the past few days with mild erythema.  \par pt has been following at lymphedema clinic and having legs wrapped with significant improvement in edema and feels like he is able to be more mobile 2/2 reduction in edema

## 2022-06-21 NOTE — ASSESSMENT
[FreeTextEntry1] : 76 yo male with a history of cad s/p cabg, ppm, afib, multiple orthopedic surgeries of the left lower extremity and vein strippings in the past resulting in lymphedema and multiple episodes of lower extremity cellulitis, hypothyroidism, presents for follow up.  pt reports pain over the right thigh over the past few days with mild erythema.  \par \par will start augmentin for cellulitis \par pt to follow up in 2 weeks \par given good results would continue with lymphedema therapy

## 2022-06-27 DIAGNOSIS — R60.0 LOCALIZED EDEMA: ICD-10-CM

## 2022-06-28 ENCOUNTER — INPATIENT (INPATIENT)
Facility: HOSPITAL | Age: 77
LOS: 9 days | Discharge: HOME HEALTH | DRG: 286 | End: 2022-07-08
Attending: INTERNAL MEDICINE | Admitting: INTERNAL MEDICINE
Payer: MEDICARE

## 2022-06-28 VITALS
SYSTOLIC BLOOD PRESSURE: 132 MMHG | HEIGHT: 67 IN | WEIGHT: 259.93 LBS | DIASTOLIC BLOOD PRESSURE: 74 MMHG | TEMPERATURE: 98 F | HEART RATE: 102 BPM | RESPIRATION RATE: 18 BRPM | OXYGEN SATURATION: 96 %

## 2022-06-28 DIAGNOSIS — Z95.0 PRESENCE OF CARDIAC PACEMAKER: Chronic | ICD-10-CM

## 2022-06-28 DIAGNOSIS — Z98.890 OTHER SPECIFIED POSTPROCEDURAL STATES: Chronic | ICD-10-CM

## 2022-06-28 DIAGNOSIS — I50.9 HEART FAILURE, UNSPECIFIED: ICD-10-CM

## 2022-06-28 DIAGNOSIS — Z95.5 PRESENCE OF CORONARY ANGIOPLASTY IMPLANT AND GRAFT: Chronic | ICD-10-CM

## 2022-06-28 DIAGNOSIS — Z96.652 PRESENCE OF LEFT ARTIFICIAL KNEE JOINT: Chronic | ICD-10-CM

## 2022-06-28 DIAGNOSIS — Z98.49 CATARACT EXTRACTION STATUS, UNSPECIFIED EYE: Chronic | ICD-10-CM

## 2022-06-28 LAB
APPEARANCE UR: CLEAR — SIGNIFICANT CHANGE UP
APTT BLD: 32.2 SEC — SIGNIFICANT CHANGE UP (ref 27.5–35.5)
BACTERIA # UR AUTO: NEGATIVE — SIGNIFICANT CHANGE UP
BASOPHILS # BLD AUTO: 0.06 K/UL — SIGNIFICANT CHANGE UP (ref 0–0.2)
BASOPHILS NFR BLD AUTO: 0.7 % — SIGNIFICANT CHANGE UP (ref 0–2)
BILIRUB UR-MCNC: NEGATIVE — SIGNIFICANT CHANGE UP
COLOR SPEC: YELLOW — SIGNIFICANT CHANGE UP
DIFF PNL FLD: NEGATIVE — SIGNIFICANT CHANGE UP
EOSINOPHIL # BLD AUTO: 0.11 K/UL — SIGNIFICANT CHANGE UP (ref 0–0.5)
EOSINOPHIL NFR BLD AUTO: 1.3 % — SIGNIFICANT CHANGE UP (ref 0–6)
EPI CELLS # UR: 0 /HPF — SIGNIFICANT CHANGE UP
FLUAV AG NPH QL: SIGNIFICANT CHANGE UP
FLUBV AG NPH QL: SIGNIFICANT CHANGE UP
GLUCOSE UR QL: NEGATIVE — SIGNIFICANT CHANGE UP
HCT VFR BLD CALC: 31.2 % — LOW (ref 39–50)
HGB BLD-MCNC: 9.8 G/DL — LOW (ref 13–17)
HYALINE CASTS # UR AUTO: 1 /LPF — SIGNIFICANT CHANGE UP (ref 0–2)
IMM GRANULOCYTES NFR BLD AUTO: 0.3 % — SIGNIFICANT CHANGE UP (ref 0–1.5)
INR BLD: 1.35 RATIO — HIGH (ref 0.88–1.16)
KETONES UR-MCNC: NEGATIVE — SIGNIFICANT CHANGE UP
LEUKOCYTE ESTERASE UR-ACNC: NEGATIVE — SIGNIFICANT CHANGE UP
LYMPHOCYTES # BLD AUTO: 0.73 K/UL — LOW (ref 1–3.3)
LYMPHOCYTES # BLD AUTO: 8.4 % — LOW (ref 13–44)
MCHC RBC-ENTMCNC: 29.6 PG — SIGNIFICANT CHANGE UP (ref 27–34)
MCHC RBC-ENTMCNC: 31.4 GM/DL — LOW (ref 32–36)
MCV RBC AUTO: 94.3 FL — SIGNIFICANT CHANGE UP (ref 80–100)
MONOCYTES # BLD AUTO: 0.81 K/UL — SIGNIFICANT CHANGE UP (ref 0–0.9)
MONOCYTES NFR BLD AUTO: 9.4 % — SIGNIFICANT CHANGE UP (ref 2–14)
NEUTROPHILS # BLD AUTO: 6.91 K/UL — SIGNIFICANT CHANGE UP (ref 1.8–7.4)
NEUTROPHILS NFR BLD AUTO: 79.9 % — HIGH (ref 43–77)
NITRITE UR-MCNC: NEGATIVE — SIGNIFICANT CHANGE UP
NRBC # BLD: 0 /100 WBCS — SIGNIFICANT CHANGE UP (ref 0–0)
NT-PROBNP SERPL-SCNC: 1983 PG/ML — HIGH (ref 0–300)
PH UR: 6 — SIGNIFICANT CHANGE UP (ref 5–8)
PLATELET # BLD AUTO: 147 K/UL — LOW (ref 150–400)
PROT UR-MCNC: ABNORMAL
PROTHROM AB SERPL-ACNC: 15.6 SEC — HIGH (ref 10.5–13.4)
RBC # BLD: 3.31 M/UL — LOW (ref 4.2–5.8)
RBC # FLD: 14.5 % — SIGNIFICANT CHANGE UP (ref 10.3–14.5)
RBC CASTS # UR COMP ASSIST: 1 /HPF — SIGNIFICANT CHANGE UP (ref 0–4)
RSV RNA NPH QL NAA+NON-PROBE: SIGNIFICANT CHANGE UP
SARS-COV-2 RNA SPEC QL NAA+PROBE: SIGNIFICANT CHANGE UP
SP GR SPEC: 1.03 — HIGH (ref 1.01–1.02)
TROPONIN T, HIGH SENSITIVITY RESULT: 25 NG/L — SIGNIFICANT CHANGE UP (ref 0–51)
UROBILINOGEN FLD QL: ABNORMAL
WBC # BLD: 8.65 K/UL — SIGNIFICANT CHANGE UP (ref 3.8–10.5)
WBC # FLD AUTO: 8.65 K/UL — SIGNIFICANT CHANGE UP (ref 3.8–10.5)
WBC UR QL: 1 /HPF — SIGNIFICANT CHANGE UP (ref 0–5)

## 2022-06-28 PROCEDURE — 99285 EMERGENCY DEPT VISIT HI MDM: CPT | Mod: FS

## 2022-06-28 PROCEDURE — 71045 X-RAY EXAM CHEST 1 VIEW: CPT | Mod: 26

## 2022-06-28 PROCEDURE — 93010 ELECTROCARDIOGRAM REPORT: CPT

## 2022-06-28 RX ORDER — METOPROLOL TARTRATE 50 MG
100 TABLET ORAL DAILY
Refills: 0 | Status: DISCONTINUED | OUTPATIENT
Start: 2022-06-28 | End: 2022-07-08

## 2022-06-28 RX ORDER — ASPIRIN/CALCIUM CARB/MAGNESIUM 324 MG
81 TABLET ORAL DAILY
Refills: 0 | Status: DISCONTINUED | OUTPATIENT
Start: 2022-06-28 | End: 2022-07-08

## 2022-06-28 RX ORDER — APIXABAN 2.5 MG/1
5 TABLET, FILM COATED ORAL EVERY 12 HOURS
Refills: 0 | Status: DISCONTINUED | OUTPATIENT
Start: 2022-06-28 | End: 2022-07-06

## 2022-06-28 RX ORDER — CILOSTAZOL 100 MG/1
100 TABLET ORAL
Refills: 0 | Status: DISCONTINUED | OUTPATIENT
Start: 2022-06-28 | End: 2022-06-29

## 2022-06-28 RX ORDER — ESCITALOPRAM OXALATE 10 MG/1
5 TABLET, FILM COATED ORAL DAILY
Refills: 0 | Status: DISCONTINUED | OUTPATIENT
Start: 2022-06-28 | End: 2022-07-08

## 2022-06-28 RX ORDER — LEVOTHYROXINE SODIUM 125 MCG
137 TABLET ORAL DAILY
Refills: 0 | Status: DISCONTINUED | OUTPATIENT
Start: 2022-06-28 | End: 2022-07-08

## 2022-06-28 RX ORDER — SPIRONOLACTONE 25 MG/1
25 TABLET, FILM COATED ORAL DAILY
Refills: 0 | Status: DISCONTINUED | OUTPATIENT
Start: 2022-06-28 | End: 2022-07-08

## 2022-06-28 RX ORDER — CEPHALEXIN 500 MG
500 CAPSULE ORAL THREE TIMES A DAY
Refills: 0 | Status: DISCONTINUED | OUTPATIENT
Start: 2022-06-28 | End: 2022-07-03

## 2022-06-28 RX ORDER — BUMETANIDE 0.25 MG/ML
1 INJECTION INTRAMUSCULAR; INTRAVENOUS
Refills: 0 | Status: DISCONTINUED | OUTPATIENT
Start: 2022-06-28 | End: 2022-07-03

## 2022-06-28 RX ADMIN — Medication 100 MILLIGRAM(S): at 20:47

## 2022-06-28 RX ADMIN — APIXABAN 5 MILLIGRAM(S): 2.5 TABLET, FILM COATED ORAL at 20:47

## 2022-06-28 RX ADMIN — Medication 500 MILLIGRAM(S): at 22:44

## 2022-06-28 NOTE — ED ADULT TRIAGE NOTE - CHIEF COMPLAINT QUOTE
anasarca, pleural effusion, blood in urine- on xarelto and coumadin and anasarca  sent by dr. aivla office

## 2022-06-28 NOTE — ED PROVIDER NOTE - ATTENDING APP SHARED VISIT CONTRIBUTION OF CARE
PMD Jules Vasc Surg, Brian Surg, María CT Surg, Cards Amparo, Roni Lainez pcp, Last Admit S Geen. Urology Francisco  77y m pmh BPH, Chronic venous insufficiency, HLD Hypothyroidism, S/P CABG x 2, S/P mitral valve replacement, SP Status post angioplasty, Sp TKR rt, Anasarca Pt comes to ed c/o PMD Jules Vasc Surg, Brian Surg, María CT Surg, Roni Lainez pcp, Last Admit S Gene. Urology Francisco  77y m pmh BPH, Chronic venous insufficiency, HLD Hypothyroidism, S/P CABG x 2, S/P mitral valve replacement, SP Status post angioplasty, Sp TKR rt, Anasarca Pt comes to ed c/o shortness of breath for past month w progressive worsening of anasarca w swelling of scrotum and diff urinating, Has been noncompliant on diuretics for past two weeks w 20lb weight gain over past month. No fever and chills, sputum, nvdc, cp. PE Adult male lying stretcher w maked swelling to josh lower extr, w pitting edema,  chest  scant josh crackles w slight wheeze and prolonged exp phase. Cv old sternal scar, no rubs, gallops or murmurs abd soft +bs neruo gcs 15 speech fluent moves all extr  Cesar Mead MD, Facep

## 2022-06-28 NOTE — H&P ADULT - ASSESSMENT
77  year old male    h/o   , CAD s/p stent on ASA, A-fib/ PPM, , hypothyroid, and total left knee replacement    s/p rash across the upper torso/ back  for past week, pruritic on R arm, and rash in the groin     prior  PPM  interrogation  with  [facility]/   s/p  brief   bursts  of  afib/ , on prior visit   Ct chest, retrosternal hematoma.  mod left pl effusion          admitted with sob,  from acute  diastolic  chf, related  to non compliance  with his  lasix     c/c   lymphedema,  carla left  leg   *  CAD/ AFIb  /PPM,/ HTN   , stent in 2007   *  h/o  AFIB,  was  not  on  a/c    * Anemia, , hb stable, had  been seen by  gi  eval dr joann sanchez in past    right leg with distal redness.  not cellulitis / c/c  for past few  months  an d left leg lymphedema      * Echo, normal  ef/ severe MR and,  ERENDIRA ,  severe  MR    cath, with 2 vessel disease,  s/p  CABG and  MVR  surg d r marni   *  h/o  Afib on  coumadin  on asa.  lipitor, torpol,  synthroid   coumadin/  for  afib  follow  daily inr/             f/p wih  dr mikayla dick   ra from: CT Chest No Cont (09.08.21 @ 08:32) >  IMPRESSION:  Left pleural effusion with near complete left lower lobe atelectasis.  6.7 x 2.8 cm retrosternal hematoma. No comment can be made about active extravasation on this noncontrast exam.  These findings were discussed with NP FRANCISCO MCGEE on 9/8/2021 at 2:18 PM by Dr. Morfin with read back confirmation.  --- End of Report ---  < end of copied text >                                 77  year old male    h/o   , CAD s/p stent on ASA, A-fib/ PPM, , hypothyroid, and total left knee replacement    s/p rash across the upper torso/ back  for past week, pruritic on R arm, and rash in the groin     prior  PPM  interrogation  with  [facility]/   s/p  brief   bursts  of  afib/ , on prior visit   Ct chest, retrosternal hematoma.  mod left pl effusion          admitted with sob, / pedal  edema/ scrotal edema  from acute  diastolic  chf, related  to non compliance  with his  lasix     c/c   lymphedema,  carla left  leg   *  CAD/ AFIb  /PPM,/ HTN   , stent in 2007   *  h/o  AFIB,  was  not  on  a/c    * Anemia, , hb stable, had  been seen by  gi  eval dr joann sanchez in past    right leg with distal redness.  not cellulitis / c/c  for past few  months  an d left leg lymphedema      * Echo, normal  ef/ severe MR and,  ERENDIRA ,  severe  MR    cath, with 2 vessel disease,  s/p  CABG and  MVR  surg d r marni   *  h/o  Afib on    eliquis  on asa.  lipitor, torpol,  synthroid   coumadin/  for  afib  follow  daily inr/          f/p wih  dr mikayla dick                                77  year old male    h/o   , CAD s/p stent on ASA, A-fib/ PPM, , hypothyroid, and total left knee replacement    s/p rash across the upper torso/ back  for past week, pruritic on R arm, and rash in the groin     prior  PPM  interrogation  with  [facility]/   s/p  brief   bursts  of  afib/ , on prior visit   Ct chest, retrosternal hematoma.  mod left pl effusion          admitted with sob, / pedal  edema/ scrotal edema  from acute  diastolic  chf, related  to non compliance  with his  lasix     c/c   lymphedema,  carla left  leg   *  CAD/ AFIb  /PPM,/ HTN   , stent in 2007   *  h/o  AFIB,  was  not  on  a/c    * Anemia, , hb stable, had  been seen by  gi  eval dr joann sanchez in past    right leg with distal redness.  not cellulitis / c/c  for past few  months  an d left leg lymphedema      * Echo, normal  ef/ severe MR and,  ERENDIRA ,  severe  MR    cath, with 2 vessel disease,  s/p  CABG and  MVR  surg d r marni   *  h/o  Afib on    eliquis  on asa.  lipitor, torpol,  synthroid    c/c leg redness/  4 +  edema, on keflex         f/p wih  dr mikayla dick

## 2022-06-28 NOTE — ED PROVIDER NOTE - OBJECTIVE STATEMENT
78 y/o male PMHx HTN, HLD, CAD s/p stent on ASA, A-fib, hypothyroid, CHF now presenting to the ED worsening SOB, DUNCAN, peripheral edema, scrotal edema and 20 pound unintentional weight gain over last month. Patient reported non compliance with diuretics as he had doctors appts to attend to and did not want to persistently have to void. Patient is experiencing difficulty voiding 2/2 scrotal edema. Denied CP, cough, fever, N/V/D,

## 2022-06-28 NOTE — ED PROVIDER NOTE - NS ED ATTENDING STATEMENT MOD
This was a shared visit with the NORIS. I reviewed and verified the documentation and independently performed the documented:

## 2022-06-28 NOTE — ED PROVIDER NOTE - CLINICAL SUMMARY MEDICAL DECISION MAKING FREE TEXT BOX
Adult male w above hx pw worsening anasarca ? noncompliance on diuretics, w urinary difficulty, shortness of breath, Concerns for fluid overload, Check labs, xray, trop, ua, plan tba for inpatient management.

## 2022-06-28 NOTE — ED ADULT NURSE NOTE - ED STAT RN HANDOFF DETAILS
Report given to receiving change of shift ALEENA Roberson patient is in no acute distress. Patient vital signs stable, plan of care explained.

## 2022-06-28 NOTE — ED ADULT NURSE NOTE - OBJECTIVE STATEMENT
78 yo M aaox4 h/o x1 cardiac stent, valve replacement, presents to Ed via EMS from urologist office c/o sob, hematuria, anasarca, R groin pain, as per pt about 1 month ago started with edema (waist down)  sob, worsening in th elast few days, pt reports that he has been having difficulty urination, able to urine using the urinal, reports that he cant empty at all, "just little be", pt also reports hematuria, On examination pt have edema ( anasarca) from the wait down , redness, warm to touch, pt c/o R groin pain, Pt denies CP,, HA, vision changes, n/v/d, fevers chills, abdominal pain. Safety and comfort measures initiated- bed placed in lowest position and side rails raised.

## 2022-06-28 NOTE — H&P ADULT - NSHPLABSRESULTS_GEN_ALL_CORE
LABS:                        9.8    8.65  )-----------( 147      ( 2022 17:42 )             31.2         140  |  105  |  19  ----------------------------<  104<H>  3.9   |  26  |  1.07    Ca    9.0      2022 17:42    TPro  6.7  /  Alb  3.7  /  TBili  0.9  /  DBili  x   /  AST  23  /  ALT  12  /  AlkPhos  118  -    PT/INR - ( 2022 17:42 )   PT: 15.6 sec;   INR: 1.35 ratio         PTT - ( 2022 17:42 )  PTT:32.2 sec      Urinalysis Basic - ( 2022 18:52 )    Color: Yellow / Appearance: Clear / S.029 / pH: x  Gluc: x / Ketone: Negative  / Bili: Negative / Urobili: 2 mg/dL   Blood: x / Protein: 30 mg/dL / Nitrite: Negative   Leuk Esterase: Negative / RBC: 1 /hpf / WBC 1 /HPF   Sq Epi: x / Non Sq Epi: 0 /hpf / Bacteria: Negative

## 2022-06-28 NOTE — CONSULT NOTE ADULT - SUBJECTIVE AND OBJECTIVE BOX
CHIEF COMPLAINT:Patient is a 77y old  Male who presents with a chief complaint of     HPI:  78 y/o male PMHx HTN, HLD, CAD s/p stent on ASA, A-fib, hypothyroid, CHF now presenting to the ED worsening SOB, DUNCAN, peripheral edema, scrotal edema and 20 pound unintentional weight gain over last month. Patient reported non compliance with diuretics as he had doctors appts to attend to and did not want to persistently have to void. Patient is experiencing difficulty voiding 2/2 scrotal edema. Denied CP, cough, fever, N/V/D,    PAST MEDICAL & SURGICAL HISTORY:  Afib  not on anticoagulation      CAD (coronary artery disease)      Varicose vein of leg      Hypothyroid      Pacemaker      H/O fracture of hip  2017      H/O asbestosis      HTN (hypertension)      Pacemaker  [employer] - Model RVDR01 1/10/2012      Stented coronary artery  drug eluding stent 5/2007      H/O detached retina repair  1952      S/P cataract surgery      History of hip surgery  left hip ORIF      H/O varicose vein stripping  1993 left leg      History of left knee replacement  2013 - multiple infections post op requiring reoperation in 2015, 2017, 2019)      H/O foot surgery  for infection of right foot 2019      H/O inguinal hernia repair  right 1993          MEDICATIONS  (STANDING):  hydrocortisone 1% topical cream: 1 application topically once a day  · 	clotrimazole 1% topical cream: 1 application topically 2 times a day  · 	metoprolol succinate 100 mg oral tablet, extended release: 1 tab(s) orally once a day  · 	ferrous sulfate 325 mg (65 mg elemental iron) oral tablet: 1 tab(s) orally once a day  · 	torsemide 20 mg oral tablet: 1 tab(s) orally once a day  · 	spironolactone 25 mg oral tablet: 1 tab(s) orally every 12 hours  · 	folic acid 1 mg oral tablet: 1 tab(s) orally once a day  · 	escitalopram 5 mg oral tablet: 1 tab(s) orally once a day  · 	acetaminophen 325 mg oral tablet: 2 tab(s) orally every 6 hours, As needed, Mild Pain (1 - 3)  · 	Synthroid 137 mcg (0.137 mg) oral tablet: 1 tab(s) orally once a day  · 	aspirin 81 mg oral tablet: 1  orally once a day  · 	Vitamin D3 2000 intl units (50 mcg) oral tablet: 1  orally once a day  · 	Vitamin C 500 mg oral tablet: 1 tab(s) orally once a day  · 	Vytorin 10 mg-40 mg oral tablet: 1 tab(s) orally once a day  · 	cilostazol 100 mg oral tablet: 1 tab(s) orally 2 times a day  · 	Coumadin 4 mg oral tablet: 1 tab(s) orally once a day (at bedtime)    MEDICATIONS  (PRN):      FAMILY HISTORY:  FH: skin cancer (Father)        SOCIAL HISTORY:    [ ] Non-smoker  [ ] Smoker  [ ] Alcohol    Allergies    alfuzosin (Hives)  levofloxacin (Hives)  Myrbetriq (Hives)  tamsulosin (Hives)    Intolerances    	    REVIEW OF SYSTEMS:  CONSTITUTIONAL: No fever, weight loss, or fatigue  EYES: No eye pain, visual disturbances, or discharge  ENT:  No difficulty hearing, tinnitus, vertigo; No sinus or throat pain  NECK: No pain or stiffness  RESPIRATORY: No cough, wheezing, chills or hemoptysis; + Shortness of Breath  CARDIOVASCULAR: No chest pain, palpitations, passing out, dizziness, or leg swelling  GASTROINTESTINAL: No abdominal or epigastric pain. No nausea, vomiting, or hematemesis; No diarrhea or constipation. No melena or hematochezia.  GENITOURINARY: No dysuria, frequency, hematuria, or incontinence  NEUROLOGICAL: No headaches, memory loss, loss of strength, numbness, or tremors  SKIN: No itching, burning, rashes, or lesions   LYMPH Nodes: No enlarged glands  ENDOCRINE: No heat or cold intolerance; No hair loss  MUSCULOSKELETAL: No joint pain or swelling; No muscle, back, or extremity pain  PSYCHIATRIC: No depression, anxiety, mood swings, or difficulty sleeping  HEME/LYMPH: No easy bruising, or bleeding gums  ALLERGY AND IMMUNOLOGIC: No hives or eczema	    [ ] All others negative	  [ ] Unable to obtain    PHYSICAL EXAM:  T(C): 36.7 (06-28-22 @ 17:26), Max: 36.7 (06-28-22 @ 17:26)  HR: 84 (06-28-22 @ 17:26) (84 - 102)  BP: 134/82 (06-28-22 @ 17:26) (132/74 - 134/82)  RR: 18 (06-28-22 @ 17:26) (18 - 18)  SpO2: 97% (06-28-22 @ 17:26) (96% - 97%)  Wt(kg): --  I&O's Summary      Appearance: Normal	  HEENT:   Normal oral mucosa, PERRL, EOMI	  Lymphatic: No lymphadenopathy  Cardiovascular: Normal S1 S2, No JVD, + murmurs, + edema  Respiratory: Lungs clear to auscultation	  Psychiatry: A & O x 3, Mood & affect appropriate  Gastrointestinal:  Soft, Non-tender, + BS	  Skin: No rashes, No ecchymoses, No cyanosis	  Neurologic: Non-focal  Extremities: Normal range of motion, No clubbing, cyanosis , + edema  Vascular: Peripheral pulses palpable 2+ bilaterally    TELEMETRY: 	    ECG:  	  RADIOLOGY:  OTHER: 	  	  LABS:	 	    CARDIAC MARKERS:                              9.8    8.65  )-----------( 147      ( 28 Jun 2022 17:42 )             31.2     06-28    140  |  105  |  19  ----------------------------<  104<H>  3.9   |  26  |  1.07    Ca    9.0      28 Jun 2022 17:42    TPro  6.7  /  Alb  3.7  /  TBili  0.9  /  DBili  x   /  AST  23  /  ALT  12  /  AlkPhos  118  06-28    proBNP: Serum Pro-Brain Natriuretic Peptide: 1983 pg/mL (06-28 @ 17:42)    Lipid Profile:   HgA1c:   TSH:   PT/INR - ( 28 Jun 2022 17:42 )   PT: 15.6 sec;   INR: 1.35 ratio         PTT - ( 28 Jun 2022 17:42 )  PTT:32.2 sec    PREVIOUS DIAGNOSTIC TESTING:    < from: 12 Lead ECG (09.01.21 @ 00:31) >  Diagnosis Line SINUS RHYTHM WITH 1ST DEGREE A-V BLOCK WITH PREMATURE SUPRAVENTRICULAR COMPLEXES  WHEN COMPARED WITH ECG OF 31-AUG-2021 00:11,  NO SIGNIFICANT CHANGE WAS FOUND  < from: TTE with Doppler (w/Cont) (09.03.21 @ 15:30) >  1. Bioprosthetic mitral valve replacement.   The valve is  well seated.  Minimal mitral regurgitation. Peak mitral  valve gradient equals 8 mm Hg, mean transmitral valve  gradient equals 4 mm Hg, which is probably normal in the  setting of a bioprosthetic mitral valve replacement.  Gradients measured at a HR of 82bpm.  2. Normal trileaflet aortic valve. Mild aortic  regurgitation.  3. Endocardial visualization enhanced with intravenous  injection of Ultrasonic Enhancing Agent (Definity). No left  ventricular thrombus. Normal left ventricular systolic  function. No segmental wall motion abnormalities. Septal  motion consistent with cardiac surgery.  4. The right ventricle is not well visualized.  *** Compared with echocardiogram of 8/30/2021, there has  been an interval mitral valve replacement.    < from: 12 Lead ECG (09.01.21 @ 00:31) >  Diagnosis Line SINUS RHYTHM WITH 1ST DEGREE A-V BLOCK WITH PREMATURE SUPRAVENTRICULAR COMPLEXES  WHEN COMPARED WITH ECG OF 31-AUG-2021 00:11,  NO SIGNIFICANT CHANGE WAS FOUND    < from: Xray Chest 1 View AP/PA (06.28.22 @ 17:40) >  Pulmonary vascular congestion and left pleural effusion.  Sternotomy. Cardiomegaly. Mitralvalve replacement. Left atrial appendage   clip. Left chest wall cardiac pacer.  The visualized osseous and soft tissue structures demonstrate no acute   pathology.    IMPRESSION:  Pulmonary vascular congestion and left pleural effusion.

## 2022-06-28 NOTE — CONSULT NOTE ADULT - ASSESSMENT
76 y/o male PMHx HTN, HLD, CAD s/p stent on ASA, A-fib, hypothyroid, CHF now presenting to the ED worsening SOB, DUNCAN, peripheral edema, scrotal edema and 20 pound unintentional weight gain over last month. Patient reported non compliance with diuretics as he had doctors appts to attend to and did not want to persistently have to void. Patient is experiencing difficulty voiding 2/2 scrotal edema. Denied CP, cough, fever, N/V/D,  pt with sig cardiac hx with sob/ edema  chf ?systolic acute on chronic  will adjust cardiac meds  iv lasix  repeat echo  beta blocker/ ace/lasix/aldactone  continue ac sec to a.fib  strict i/o  check lytes

## 2022-06-28 NOTE — ED ADULT NURSE NOTE - CHIEF COMPLAINT QUOTE
anasarca, pleural effusion, blood in urine- on xarelto and coumadin and anasarca  sent by dr. avila office

## 2022-06-28 NOTE — H&P ADULT - NSHPPHYSICALEXAM_GEN_ALL_CORE
PHYSICAL EXAMINATION:  Vital Signs Last 24 Hrs  T(C): 36.7 (28 Jun 2022 17:26), Max: 36.7 (28 Jun 2022 17:26)  T(F): 98.1 (28 Jun 2022 17:26), Max: 98.1 (28 Jun 2022 17:26)  HR: 84 (28 Jun 2022 17:26) (84 - 102)  BP: 134/82 (28 Jun 2022 17:26) (132/74 - 134/82)  BP(mean): --  RR: 18 (28 Jun 2022 17:26) (18 - 18)  SpO2: 97% (28 Jun 2022 17:26) (96% - 97%)  CAPILLARY BLOOD GLUCOSE            GENERAL: NAD, well-groomed,  HEAD:  atraumatic, normocephalic  EYES: sclera anicteric  ENMT: mucous membranes moist  NECK: supple, No JVD  CHEST/LUNG: clear to auscultation bilaterally;    no      rales   ,   no rhonchi,   HEART: normal S1, S2  ABDOMEN: BS+, soft, ND, NT   EXTREMITIES:    no    edema    b/l LEs  NEURO: awake, ,     moves all extremities  SKIN: no     rash

## 2022-06-28 NOTE — H&P ADULT - HISTORY OF PRESENT ILLNESS
78 y/o male   PMHx HTN, HLD, CAD s/p stent on ASA, A-fib, hypothyroid, CHF   now presenting to the ED worsening SOB, DUNCAN, peripheral edema, scrotal edema and 20 pound unintentional weight gain over last month  pt with non compliance with diuretics as he had doctors appts to attend to and did not want to persistently have to void. Patient is experiencing difficulty voiding 2/2 scrotal edema.   Denies  CP, cough, fever, N/V/D,

## 2022-06-29 LAB
ANION GAP SERPL CALC-SCNC: 8 MMOL/L — SIGNIFICANT CHANGE UP (ref 5–17)
BUN SERPL-MCNC: 17 MG/DL — SIGNIFICANT CHANGE UP (ref 7–23)
CALCIUM SERPL-MCNC: 8.6 MG/DL — SIGNIFICANT CHANGE UP (ref 8.4–10.5)
CHLORIDE SERPL-SCNC: 106 MMOL/L — SIGNIFICANT CHANGE UP (ref 96–108)
CO2 SERPL-SCNC: 26 MMOL/L — SIGNIFICANT CHANGE UP (ref 22–31)
CREAT SERPL-MCNC: 1.02 MG/DL — SIGNIFICANT CHANGE UP (ref 0.5–1.3)
CULTURE RESULTS: NO GROWTH — SIGNIFICANT CHANGE UP
EGFR: 76 ML/MIN/1.73M2 — SIGNIFICANT CHANGE UP
GLUCOSE SERPL-MCNC: 106 MG/DL — HIGH (ref 70–99)
HCT VFR BLD CALC: 31.1 % — LOW (ref 39–50)
HGB BLD-MCNC: 9.7 G/DL — LOW (ref 13–17)
MCHC RBC-ENTMCNC: 29.6 PG — SIGNIFICANT CHANGE UP (ref 27–34)
MCHC RBC-ENTMCNC: 31.2 GM/DL — LOW (ref 32–36)
MCV RBC AUTO: 94.8 FL — SIGNIFICANT CHANGE UP (ref 80–100)
NRBC # BLD: 0 /100 WBCS — SIGNIFICANT CHANGE UP (ref 0–0)
PLATELET # BLD AUTO: 142 K/UL — LOW (ref 150–400)
POTASSIUM SERPL-MCNC: 3.8 MMOL/L — SIGNIFICANT CHANGE UP (ref 3.5–5.3)
POTASSIUM SERPL-SCNC: 3.8 MMOL/L — SIGNIFICANT CHANGE UP (ref 3.5–5.3)
RBC # BLD: 3.28 M/UL — LOW (ref 4.2–5.8)
RBC # FLD: 14.6 % — HIGH (ref 10.3–14.5)
SODIUM SERPL-SCNC: 140 MMOL/L — SIGNIFICANT CHANGE UP (ref 135–145)
SPECIMEN SOURCE: SIGNIFICANT CHANGE UP
WBC # BLD: 5.03 K/UL — SIGNIFICANT CHANGE UP (ref 3.8–10.5)
WBC # FLD AUTO: 5.03 K/UL — SIGNIFICANT CHANGE UP (ref 3.8–10.5)

## 2022-06-29 RX ADMIN — Medication 500 MILLIGRAM(S): at 13:16

## 2022-06-29 RX ADMIN — BUMETANIDE 1 MILLIGRAM(S): 0.25 INJECTION INTRAMUSCULAR; INTRAVENOUS at 05:41

## 2022-06-29 RX ADMIN — Medication 137 MICROGRAM(S): at 05:41

## 2022-06-29 RX ADMIN — CILOSTAZOL 100 MILLIGRAM(S): 100 TABLET ORAL at 05:42

## 2022-06-29 RX ADMIN — BUMETANIDE 1 MILLIGRAM(S): 0.25 INJECTION INTRAMUSCULAR; INTRAVENOUS at 13:16

## 2022-06-29 RX ADMIN — SPIRONOLACTONE 25 MILLIGRAM(S): 25 TABLET, FILM COATED ORAL at 05:42

## 2022-06-29 RX ADMIN — Medication 100 MILLIGRAM(S): at 05:41

## 2022-06-29 RX ADMIN — Medication 500 MILLIGRAM(S): at 21:35

## 2022-06-29 RX ADMIN — APIXABAN 5 MILLIGRAM(S): 2.5 TABLET, FILM COATED ORAL at 17:25

## 2022-06-29 RX ADMIN — ESCITALOPRAM OXALATE 5 MILLIGRAM(S): 10 TABLET, FILM COATED ORAL at 13:16

## 2022-06-29 RX ADMIN — Medication 81 MILLIGRAM(S): at 13:16

## 2022-06-29 RX ADMIN — APIXABAN 5 MILLIGRAM(S): 2.5 TABLET, FILM COATED ORAL at 05:42

## 2022-06-29 RX ADMIN — Medication 500 MILLIGRAM(S): at 05:42

## 2022-06-29 NOTE — PROGRESS NOTE ADULT - ASSESSMENT
77  year old male    h/o   , CAD s/p stent on ASA, A-fib/ PPM, , hypothyroid, and total left knee replacement    s/p rash across the upper torso/ back  for past week, pruritic on R arm, and rash in the groin     prior  PPM  interrogation  with  WCT/   s/p  brief   bursts  of  afib/ , on prior visit   Ct chest, retrosternal hematoma.  mod left pl effusion          admitted with sob, / pedal  edema/ scrotal edema  from acute  diastolic  chf, related  to non compliance  with his  lasix     c/c   lymphedema,  carla left  leg   *  CAD/ AFIb  /PPM,/ HTN   , stent in 2007   *  h/o  AFIB,  was  not  on  a/c    * Anemia, , hb stable, had  been seen by  gi  eval dr joann sanchez in past    right leg with distal redness.  not cellulitis / c/c  for past few  months  an d left leg lymphedema      * Echo, normal  ef/ severe MR .  s/p  MVR    cath, with 2 vessel disease,  s/p  CABG and  MVR  surg d r marni   *  h/o  Afib on    eliquis  on asa.  lipitor, torpol,  synthroid    c/c leg redness/  4 +  edema, on keflex  contionue  iv  bumex/  h/o non compalince  with meds         f/p renee dick

## 2022-06-29 NOTE — PATIENT PROFILE ADULT - FALL HARM RISK - HARM RISK INTERVENTIONS
General (Pediatric) Assistance with ambulation/Assistance OOB with selected safe patient handling equipment/Communicate Risk of Fall with Harm to all staff/Discuss with provider need for PT consult/Monitor gait and stability/Provide patient with walking aids - walker, cane, crutches/Reinforce activity limits and safety measures with patient and family/Reorient to person, place and time as needed/Review medications for side effects contributing to fall risk/Sit up slowly, dangle for a short time, stand at bedside before walking/Tailored Fall Risk Interventions/Toileting schedule using arm’s reach rule for commode and bathroom/Use of alarms - bed, chair and/or voice tab/Visual Cue: Yellow wristband and red socks/Bed in lowest position, wheels locked, appropriate side rails in place/Call bell, personal items and telephone in reach/Instruct patient to call for assistance before getting out of bed or chair/Non-slip footwear when patient is out of bed/[place] to call system/Physically safe environment - no spills, clutter or unnecessary equipment/Purposeful Proactive Rounding/Room/bathroom lighting operational, light cord in reach

## 2022-06-30 RX ADMIN — BUMETANIDE 1 MILLIGRAM(S): 0.25 INJECTION INTRAMUSCULAR; INTRAVENOUS at 05:02

## 2022-06-30 RX ADMIN — Medication 500 MILLIGRAM(S): at 22:45

## 2022-06-30 RX ADMIN — APIXABAN 5 MILLIGRAM(S): 2.5 TABLET, FILM COATED ORAL at 18:16

## 2022-06-30 RX ADMIN — Medication 100 MILLIGRAM(S): at 05:02

## 2022-06-30 RX ADMIN — BUMETANIDE 1 MILLIGRAM(S): 0.25 INJECTION INTRAMUSCULAR; INTRAVENOUS at 14:04

## 2022-06-30 RX ADMIN — Medication 500 MILLIGRAM(S): at 05:02

## 2022-06-30 RX ADMIN — SPIRONOLACTONE 25 MILLIGRAM(S): 25 TABLET, FILM COATED ORAL at 05:02

## 2022-06-30 RX ADMIN — Medication 81 MILLIGRAM(S): at 14:04

## 2022-06-30 RX ADMIN — APIXABAN 5 MILLIGRAM(S): 2.5 TABLET, FILM COATED ORAL at 05:01

## 2022-06-30 RX ADMIN — Medication 137 MICROGRAM(S): at 05:02

## 2022-06-30 RX ADMIN — ESCITALOPRAM OXALATE 5 MILLIGRAM(S): 10 TABLET, FILM COATED ORAL at 14:05

## 2022-06-30 RX ADMIN — Medication 500 MILLIGRAM(S): at 14:04

## 2022-06-30 NOTE — PROGRESS NOTE ADULT - ASSESSMENT
77  year old male    h/o   , CAD s/p stent on ASA, A-fib/ PPM, , hypothyroid, and total left knee replacement    s/p rash across the upper torso/ back  for past week, pruritic on R arm, and rash in the groin     prior  PPM  interrogation  with  WCT/   s/p  brief   bursts  of  afib/ , on prior visit   Ct chest, retrosternal hematoma.  mod left pl effusion          admitted with sob, / pedal  edema/ scrotal edema  from acute  diastolic  chf, related  to non compliance  with his  lasix     c/c   lymphedema,  carla left  leg   *  CAD/ AFIb  /PPM,/ HTN   , stent in 2007   *  h/o  AFIB,  was  not  on  a/c    * Anemia, , hb stable, had  been seen by  gi  eval dr joann sanchez in past    right leg with distal redness.  not cellulitis / c/c  for past few  months  an d left leg lymphedema      * Echo, normal  ef/ severe MR .  s/p  MVR    cath, with 2 vessel disease,  s/p  CABG and  MVR  surg d r marni   *  h/o  Afib on    eliquis  on asa.  lipitor, torpol,  synthroid    c/c leg redness/  4 +  edema, on keflex, ha  simproved  contionue  iv  bumex/  h/o non compalince  with meds    spoke  wth wife  at  length         f/p wih  dr mikayla dick

## 2022-07-01 LAB
ANION GAP SERPL CALC-SCNC: 11 MMOL/L — SIGNIFICANT CHANGE UP (ref 5–17)
BUN SERPL-MCNC: 24 MG/DL — HIGH (ref 7–23)
CALCIUM SERPL-MCNC: 8.7 MG/DL — SIGNIFICANT CHANGE UP (ref 8.4–10.5)
CHLORIDE SERPL-SCNC: 100 MMOL/L — SIGNIFICANT CHANGE UP (ref 96–108)
CO2 SERPL-SCNC: 27 MMOL/L — SIGNIFICANT CHANGE UP (ref 22–31)
CREAT SERPL-MCNC: 1.19 MG/DL — SIGNIFICANT CHANGE UP (ref 0.5–1.3)
EGFR: 63 ML/MIN/1.73M2 — SIGNIFICANT CHANGE UP
GLUCOSE SERPL-MCNC: 102 MG/DL — HIGH (ref 70–99)
HCT VFR BLD CALC: 33.9 % — LOW (ref 39–50)
HGB BLD-MCNC: 10.6 G/DL — LOW (ref 13–17)
MAGNESIUM SERPL-MCNC: 1.8 MG/DL — SIGNIFICANT CHANGE UP (ref 1.6–2.6)
MCHC RBC-ENTMCNC: 29 PG — SIGNIFICANT CHANGE UP (ref 27–34)
MCHC RBC-ENTMCNC: 31.3 GM/DL — LOW (ref 32–36)
MCV RBC AUTO: 92.9 FL — SIGNIFICANT CHANGE UP (ref 80–100)
NRBC # BLD: 0 /100 WBCS — SIGNIFICANT CHANGE UP (ref 0–0)
PHOSPHATE SERPL-MCNC: 2.4 MG/DL — LOW (ref 2.5–4.5)
PLATELET # BLD AUTO: 169 K/UL — SIGNIFICANT CHANGE UP (ref 150–400)
POTASSIUM SERPL-MCNC: 4 MMOL/L — SIGNIFICANT CHANGE UP (ref 3.5–5.3)
POTASSIUM SERPL-SCNC: 4 MMOL/L — SIGNIFICANT CHANGE UP (ref 3.5–5.3)
RBC # BLD: 3.65 M/UL — LOW (ref 4.2–5.8)
RBC # FLD: 14.5 % — SIGNIFICANT CHANGE UP (ref 10.3–14.5)
SODIUM SERPL-SCNC: 138 MMOL/L — SIGNIFICANT CHANGE UP (ref 135–145)
WBC # BLD: 5.58 K/UL — SIGNIFICANT CHANGE UP (ref 3.8–10.5)
WBC # FLD AUTO: 5.58 K/UL — SIGNIFICANT CHANGE UP (ref 3.8–10.5)

## 2022-07-01 RX ORDER — SODIUM,POTASSIUM PHOSPHATES 278-250MG
2 POWDER IN PACKET (EA) ORAL ONCE
Refills: 0 | Status: COMPLETED | OUTPATIENT
Start: 2022-07-01 | End: 2022-07-02

## 2022-07-01 RX ADMIN — APIXABAN 5 MILLIGRAM(S): 2.5 TABLET, FILM COATED ORAL at 17:01

## 2022-07-01 RX ADMIN — Medication 100 MILLIGRAM(S): at 06:11

## 2022-07-01 RX ADMIN — Medication 500 MILLIGRAM(S): at 15:55

## 2022-07-01 RX ADMIN — Medication 500 MILLIGRAM(S): at 21:37

## 2022-07-01 RX ADMIN — ESCITALOPRAM OXALATE 5 MILLIGRAM(S): 10 TABLET, FILM COATED ORAL at 12:08

## 2022-07-01 RX ADMIN — Medication 137 MICROGRAM(S): at 06:10

## 2022-07-01 RX ADMIN — APIXABAN 5 MILLIGRAM(S): 2.5 TABLET, FILM COATED ORAL at 06:11

## 2022-07-01 RX ADMIN — SPIRONOLACTONE 25 MILLIGRAM(S): 25 TABLET, FILM COATED ORAL at 06:11

## 2022-07-01 RX ADMIN — BUMETANIDE 1 MILLIGRAM(S): 0.25 INJECTION INTRAMUSCULAR; INTRAVENOUS at 06:10

## 2022-07-01 RX ADMIN — Medication 500 MILLIGRAM(S): at 06:10

## 2022-07-01 RX ADMIN — BUMETANIDE 1 MILLIGRAM(S): 0.25 INJECTION INTRAMUSCULAR; INTRAVENOUS at 15:56

## 2022-07-01 RX ADMIN — Medication 81 MILLIGRAM(S): at 12:08

## 2022-07-01 NOTE — DIETITIAN INITIAL EVALUATION ADULT - PERSON TAUGHT/METHOD
The following were discussed with the pt: relationship of sodium intake and fluid retention, checking daily weights, impact of unhealthy fats on heart health, sources of heart healthy fats, recommended diet modifications, cooking with less salt, sodium contents of foods. Written education also provided./verbal instruction/written material/patient instructed/significant other instructed

## 2022-07-01 NOTE — DIETITIAN INITIAL EVALUATION ADULT - REASON FOR ADMISSION
"77yoM PMHx HTN, HLD, CAD s/p stent on ASA, A-fib, hypothyroid, CHF p/w worsening SOB, DUNCAN, peripheral edema, scrotal edema and 20 pound unintentional weight gain over last month. Patient reported non compliance with diuretics as he had doctors appts to attend to and did not want to persistently have to void. Patient is experiencing difficulty voiding 2/2 scrotal edema." Admitted for CHF exacerbation 2/2 medication noncompliance

## 2022-07-01 NOTE — DIETITIAN INITIAL EVALUATION ADULT - ORAL INTAKE PTA/DIET HISTORY
Pt and his significant other state diet recall high in processed foods. Pt eats breakfast and dinner, has been trying to eat lower sodium foods, like low sodium ham. Pt snacks in between lunch and dinner, high sodium snack foods. Also consumes take-out. Significant other reports she has been encouraging pt to eat lower sodium foods, and to cook for himself.

## 2022-07-01 NOTE — PROVIDER CONTACT NOTE (OTHER) - SITUATION
Pt has an episode of confusion; wanted to get out of bed & complained that "he felt everything was upside down"
Pt complaining of dyspnia, O2 at 91%

## 2022-07-01 NOTE — DIETITIAN INITIAL EVALUATION ADULT - ADD RECOMMEND
1) Please change diet to DASH diet. 2) Monitor PO intake, GI tolerance, skin integrity and labs. RD remains available if needed, pt is aware.

## 2022-07-01 NOTE — PROVIDER CONTACT NOTE (OTHER) - RECOMMENDATIONS
Provider notified
Provider notified. Bed alarm continued to be on. Pt reoriented. Safety & fall precautions in place.

## 2022-07-01 NOTE — PROVIDER CONTACT NOTE (OTHER) - ASSESSMENT
Pt A&Ox4. /76 98%O2 on 2L nasal canula. Denies Chest pain, sob, any dizzyness, weakness. Neuro assessment completed. No facial droop noted, b/l extremities strong. Pt states he was most likely sleeping and was awoken abruptly.
Pt A&Ox4. /79 HR 72 O2 91% on Room air, complaining od dyspnea. Denies CP.

## 2022-07-01 NOTE — PROVIDER CONTACT NOTE (OTHER) - REASON
Pt has an episode of confusion; complained that "he felt everything was upside down"
Pt complaining of dyspnia, O2 at 91%

## 2022-07-01 NOTE — PROGRESS NOTE ADULT - ASSESSMENT
77  year old male    h/o   , CAD s/p stent on ASA, A-fib/ PPM, , hypothyroid, and total left knee replacement    s/p rash across the upper torso/ back  for past week, pruritic on R arm, and rash in the groin     prior  PPM  interrogation  with  WCT/   s/p  brief   bursts  of  afib/ , on prior visit   Ct chest, retrosternal hematoma.  mod left pl effusion          admitted with sob, / pedal  edema/ scrotal edema  from acute  diastolic  chf, related  to non compliance  with his  lasix     c/c   lymphedema,  carla left  leg   *  CAD/ AFIb  /PPM,/ HTN   , stent in 2007   *  h/o  AFIB,  was  not  on  a/c    * Anemia, , hb stable, had  been seen by  gi  eval dr joann sanchez in past    right leg with distal redness.  not cellulitis / c/c  for past few  months  an d left leg lymphedema      * Echo, normal  ef/ severe MR .  s/p  MVR    cath, with 2 vessel disease,  s/p  CABG and  MVR  surg d r marni   *  h/o  Afib on    eliquis  on asa.  lipitor, torpol,  synthroid    c/c leg redness/  4 +  edema, on keflex, has  improved     h/o non compalince  with meds    spoke  wth wife  at  length  weigh pt/  on iv  bumex/  aldactone         f/p wih  dr mikayla dick

## 2022-07-01 NOTE — DIETITIAN INITIAL EVALUATION ADULT - PERTINENT MEDS FT
MEDICATIONS  (STANDING):  apixaban 5 milliGRAM(s) Oral every 12 hours  aspirin enteric coated 81 milliGRAM(s) Oral daily  buMETAnide Injectable 1 milliGRAM(s) IV Push two times a day  cephalexin 500 milliGRAM(s) Oral three times a day  escitalopram 5 milliGRAM(s) Oral daily  levothyroxine 137 MICROGram(s) Oral daily  metoprolol succinate  milliGRAM(s) Oral daily  spironolactone 25 milliGRAM(s) Oral daily    MEDICATIONS  (PRN):

## 2022-07-01 NOTE — DIETITIAN INITIAL EVALUATION ADULT - PERTINENT LABORATORY DATA
07-01    138  |  100  |  24<H>  ----------------------------<  102<H>  4.0   |  27  |  1.19    Ca    8.7      01 Jul 2022 06:43  Phos  2.4     07-01  Mg     1.8     07-01    A1C with Estimated Average Glucose Result: 6.0 % (08-25-21 @ 22:50)

## 2022-07-02 LAB
ANION GAP SERPL CALC-SCNC: 13 MMOL/L — SIGNIFICANT CHANGE UP (ref 5–17)
BUN SERPL-MCNC: 25 MG/DL — HIGH (ref 7–23)
CALCIUM SERPL-MCNC: 9 MG/DL — SIGNIFICANT CHANGE UP (ref 8.4–10.5)
CHLORIDE SERPL-SCNC: 100 MMOL/L — SIGNIFICANT CHANGE UP (ref 96–108)
CO2 SERPL-SCNC: 31 MMOL/L — SIGNIFICANT CHANGE UP (ref 22–31)
CREAT SERPL-MCNC: 1.22 MG/DL — SIGNIFICANT CHANGE UP (ref 0.5–1.3)
EGFR: 61 ML/MIN/1.73M2 — SIGNIFICANT CHANGE UP
GLUCOSE SERPL-MCNC: 95 MG/DL — SIGNIFICANT CHANGE UP (ref 70–99)
POTASSIUM SERPL-MCNC: 4 MMOL/L — SIGNIFICANT CHANGE UP (ref 3.5–5.3)
POTASSIUM SERPL-SCNC: 4 MMOL/L — SIGNIFICANT CHANGE UP (ref 3.5–5.3)
SODIUM SERPL-SCNC: 144 MMOL/L — SIGNIFICANT CHANGE UP (ref 135–145)

## 2022-07-02 RX ADMIN — BUMETANIDE 1 MILLIGRAM(S): 0.25 INJECTION INTRAMUSCULAR; INTRAVENOUS at 05:26

## 2022-07-02 RX ADMIN — Medication 2 TABLET(S): at 05:29

## 2022-07-02 RX ADMIN — SPIRONOLACTONE 25 MILLIGRAM(S): 25 TABLET, FILM COATED ORAL at 05:27

## 2022-07-02 RX ADMIN — APIXABAN 5 MILLIGRAM(S): 2.5 TABLET, FILM COATED ORAL at 05:26

## 2022-07-02 RX ADMIN — BUMETANIDE 1 MILLIGRAM(S): 0.25 INJECTION INTRAMUSCULAR; INTRAVENOUS at 16:55

## 2022-07-02 RX ADMIN — ESCITALOPRAM OXALATE 5 MILLIGRAM(S): 10 TABLET, FILM COATED ORAL at 11:29

## 2022-07-02 RX ADMIN — Medication 100 MILLIGRAM(S): at 05:30

## 2022-07-02 RX ADMIN — Medication 500 MILLIGRAM(S): at 21:19

## 2022-07-02 RX ADMIN — Medication 137 MICROGRAM(S): at 05:27

## 2022-07-02 RX ADMIN — APIXABAN 5 MILLIGRAM(S): 2.5 TABLET, FILM COATED ORAL at 16:55

## 2022-07-02 RX ADMIN — Medication 81 MILLIGRAM(S): at 11:29

## 2022-07-02 RX ADMIN — Medication 500 MILLIGRAM(S): at 14:59

## 2022-07-02 RX ADMIN — Medication 500 MILLIGRAM(S): at 05:27

## 2022-07-02 NOTE — PHYSICAL THERAPY INITIAL EVALUATION ADULT - PLANNED THERAPY INTERVENTIONS, PT EVAL
GOALS: Pt will negotiate 6 steps with handrail & step to pattern with independence in 4wks/bed mobility training/gait training/transfer training

## 2022-07-02 NOTE — PHYSICAL THERAPY INITIAL EVALUATION ADULT - PRECAUTIONS/LIMITATIONS, REHAB EVAL
XRAY CHEST: Pulmonary vascular congestion and left pleural effusion. XRAY CHEST: Pulmonary vascular congestion and left pleural effusion./fall precautions

## 2022-07-02 NOTE — PHYSICAL THERAPY INITIAL EVALUATION ADULT - ACTIVE RANGE OF MOTION EXAMINATION, REHAB EVAL
LLE h/o knee fused into extension/bilateral upper extremity Active ROM was WFL (within functional limits)/Right LE Active ROM was WFL (within functional limits)

## 2022-07-02 NOTE — PHYSICAL THERAPY INITIAL EVALUATION ADULT - ADDITIONAL COMMENTS
Pt resides in co-op, 1 step to enter, elevator within. PTA independent with mobility and ADL's, ambulatory with cane, owns RW & shower chair, (+)driving, retired.

## 2022-07-02 NOTE — PHYSICAL THERAPY INITIAL EVALUATION ADULT - GAIT DEVIATIONS NOTED, PT EVAL
decreased kathrin/increased time in double stance/decreased velocity of limb motion/decreased step length/decreased stride length/decreased weight-shifting ability

## 2022-07-02 NOTE — PHYSICAL THERAPY INITIAL EVALUATION ADULT - PERTINENT HX OF CURRENT PROBLEM, REHAB EVAL
Pt is 77M admitted 6/28/22 PMHx HTN, HLD, CAD s/p stent on ASA, A-fib, hypothyroid, CHF now presenting to the ED worsening SOB, DUNCAN, peripheral edema, scrotal edema and 20 pound unintentional weight gain over last month.

## 2022-07-02 NOTE — PHYSICAL THERAPY INITIAL EVALUATION ADULT - GENERAL OBSERVATIONS, REHAB EVAL
received semisupine in bed, A&OX4, following commands, pleasant & eager to participate, a/w SOB, Pt woth h/o left knee fused in extension, LLE leg length discrepency. Orthotic shoes donned for session.

## 2022-07-02 NOTE — PROGRESS NOTE ADULT - ASSESSMENT
77  year old male    h/o   , CAD s/p stent on ASA, A-fib/ PPM, , hypothyroid, and total left knee replacement    s/p rash across the upper torso/ back  for past week, pruritic on R arm, and rash in the groin     prior  PPM  interrogation  with  WCT/   s/p  brief   bursts  of  afib/ , on prior visit   Ct chest, retrosternal hematoma.  mod left pl effusion          admitted with sob, / pedal  edema/ scrotal edema  from acute  diastolic  chf, related  to non compliance  with his  lasix     c/c   lymphedema,  carla left  leg   *  CAD/ AFIb  /PPM,/ HTN   , stent in 2007   *  h/o  AFIB,  was  not  on  a/c    * Anemia, , hb stable, had  been seen by  gi  eval dr joann sanchez in past    right leg with distal redness.  not cellulitis / c/c  for past few  months  an d left leg lymphedema      * Echo, normal  ef/ severe MR .  s/p  MVR    cath, with 2 vessel disease,  s/p  CABG and  MVR  surg d r marni   *  h/o  Afib on    eliquis  on asa.  lipitor, torpol,  synthroid    c/c leg redness/  4 +  edema, on keflex, has  improved     h/o non compalince  with meds    spoke  wth wife  at  length    on iv  bumex/  aldactone/ ha slost 9  kg   continue  diuresis         f/p wih  dr mikayla dick

## 2022-07-03 LAB
ANION GAP SERPL CALC-SCNC: 9 MMOL/L — SIGNIFICANT CHANGE UP (ref 5–17)
BUN SERPL-MCNC: 27 MG/DL — HIGH (ref 7–23)
CALCIUM SERPL-MCNC: 9.2 MG/DL — SIGNIFICANT CHANGE UP (ref 8.4–10.5)
CHLORIDE SERPL-SCNC: 99 MMOL/L — SIGNIFICANT CHANGE UP (ref 96–108)
CO2 SERPL-SCNC: 34 MMOL/L — HIGH (ref 22–31)
CREAT SERPL-MCNC: 1.15 MG/DL — SIGNIFICANT CHANGE UP (ref 0.5–1.3)
EGFR: 66 ML/MIN/1.73M2 — SIGNIFICANT CHANGE UP
GLUCOSE SERPL-MCNC: 99 MG/DL — SIGNIFICANT CHANGE UP (ref 70–99)
MAGNESIUM SERPL-MCNC: 1.9 MG/DL — SIGNIFICANT CHANGE UP (ref 1.6–2.6)
PHOSPHATE SERPL-MCNC: 3.4 MG/DL — SIGNIFICANT CHANGE UP (ref 2.5–4.5)
POTASSIUM SERPL-MCNC: 4.4 MMOL/L — SIGNIFICANT CHANGE UP (ref 3.5–5.3)
POTASSIUM SERPL-SCNC: 4.4 MMOL/L — SIGNIFICANT CHANGE UP (ref 3.5–5.3)
SODIUM SERPL-SCNC: 142 MMOL/L — SIGNIFICANT CHANGE UP (ref 135–145)

## 2022-07-03 RX ORDER — BUMETANIDE 0.25 MG/ML
2 INJECTION INTRAMUSCULAR; INTRAVENOUS DAILY
Refills: 0 | Status: DISCONTINUED | OUTPATIENT
Start: 2022-07-03 | End: 2022-07-04

## 2022-07-03 RX ADMIN — APIXABAN 5 MILLIGRAM(S): 2.5 TABLET, FILM COATED ORAL at 05:43

## 2022-07-03 RX ADMIN — Medication 100 MILLIGRAM(S): at 05:43

## 2022-07-03 RX ADMIN — SPIRONOLACTONE 25 MILLIGRAM(S): 25 TABLET, FILM COATED ORAL at 05:43

## 2022-07-03 RX ADMIN — APIXABAN 5 MILLIGRAM(S): 2.5 TABLET, FILM COATED ORAL at 16:58

## 2022-07-03 RX ADMIN — Medication 500 MILLIGRAM(S): at 05:43

## 2022-07-03 RX ADMIN — BUMETANIDE 2 MILLIGRAM(S): 0.25 INJECTION INTRAMUSCULAR; INTRAVENOUS at 16:58

## 2022-07-03 RX ADMIN — BUMETANIDE 1 MILLIGRAM(S): 0.25 INJECTION INTRAMUSCULAR; INTRAVENOUS at 05:43

## 2022-07-03 RX ADMIN — ESCITALOPRAM OXALATE 5 MILLIGRAM(S): 10 TABLET, FILM COATED ORAL at 11:07

## 2022-07-03 RX ADMIN — Medication 137 MICROGRAM(S): at 05:43

## 2022-07-03 RX ADMIN — Medication 81 MILLIGRAM(S): at 11:07

## 2022-07-03 NOTE — PROGRESS NOTE ADULT - ASSESSMENT
77  year old male    h/o   , CAD s/p stent on ASA, A-fib/ PPM, , hypothyroid, and total left knee replacement    s/p rash across the upper torso/ back  for past week, pruritic on R arm, and rash in the groin     prior  PPM  interrogation  with  WCT/   s/p  brief   bursts  of  afib/ , on prior visit   Ct chest, retrosternal hematoma.  mod left pl effusion          admitted with sob, / pedal  edema/ scrotal edema  from acute  diastolic  chf, related  to non compliance  with his  lasix     c/c   lymphedema,  carla left  leg   *  CAD/ AFIb  /PPM,/ HTN   , stent in 2007   *  h/o  AFIB,  was  not  on  a/c    * Anemia, , hb stable, had  been seen by  gi  eval dr joann sanchez in past    right leg with distal redness.  not cellulitis / c/c  for past few  months  an d left leg lymphedema      * Echo, normal  ef/ severe MR .  s/p  MVR    cath, with 2 vessel disease,  s/p  CABG and  MVR  surg d r marni   *  h/o  Afib on    eliquis  on asa.  lipitor, torpol,  synthroid    c/c leg redness/  4 +  edema, on keflex, has  improved     h/o non compalince  with meds    spoke  wth wife  at  length   was  on iv  bumex/  aldactone/ has lost 9  kg   on po bumex  now/  start   d/c  planning         f/p wih  dr mikayla dick                                77  year old male    h/o   , CAD s/p stent on ASA, A-fib/ PPM, , hypothyroid, and total left knee replacement    s/p rash across the upper torso/ back  for past week, pruritic on R arm, and rash in the groin     prior  PPM  interrogation  with  WCT/   s/p  brief   bursts  of  afib/ , on prior visit   Ct chest, retrosternal hematoma.  mod left pl effusion          admitted with sob, / pedal  edema/ scrotal edema  from acute  diastolic  chf, related  to non compliance  with his  lasix     c/c   lymphedema,  carla left  leg   *  CAD/ AFIb  /PPM,/ HTN   , stent in 2007   *  h/o  AFIB,  was  not  on  a/c    * Anemia, , hb stable, had  been seen by  gi  eval dr joann sanchez in past    right leg with distal redness.  not cellulitis / c/c  for past few  months  an d left leg lymphedema      * Echo, normal  ef/ severe MR .  s/p  MVR    cath, with 2 vessel disease,  s/p  CABG and  MVR  surg d r marni   *  h/o  Afib on    eliquis  on asa.  lipitor, torpol,  synthroid    c/c leg redness/  4 +  edema, on keflex, has  improved     h/o non compalince  with meds    spoke  wth wife  at  length   was  on iv  bumex/  aldactone/ has lost 9  kg   on po bumex  now/   echo pending/  d/c  when  cleared  by  card         f/p wi  dr mikayla dick

## 2022-07-04 LAB
ANION GAP SERPL CALC-SCNC: 8 MMOL/L — SIGNIFICANT CHANGE UP (ref 5–17)
BUN SERPL-MCNC: 34 MG/DL — HIGH (ref 7–23)
CALCIUM SERPL-MCNC: 9.1 MG/DL — SIGNIFICANT CHANGE UP (ref 8.4–10.5)
CHLORIDE SERPL-SCNC: 98 MMOL/L — SIGNIFICANT CHANGE UP (ref 96–108)
CO2 SERPL-SCNC: 34 MMOL/L — HIGH (ref 22–31)
CREAT SERPL-MCNC: 1.35 MG/DL — HIGH (ref 0.5–1.3)
EGFR: 54 ML/MIN/1.73M2 — LOW
GLUCOSE SERPL-MCNC: 89 MG/DL — SIGNIFICANT CHANGE UP (ref 70–99)
MAGNESIUM SERPL-MCNC: 2 MG/DL — SIGNIFICANT CHANGE UP (ref 1.6–2.6)
PHOSPHATE SERPL-MCNC: 3.4 MG/DL — SIGNIFICANT CHANGE UP (ref 2.5–4.5)
POTASSIUM SERPL-MCNC: 3.8 MMOL/L — SIGNIFICANT CHANGE UP (ref 3.5–5.3)
POTASSIUM SERPL-SCNC: 3.8 MMOL/L — SIGNIFICANT CHANGE UP (ref 3.5–5.3)
SODIUM SERPL-SCNC: 140 MMOL/L — SIGNIFICANT CHANGE UP (ref 135–145)

## 2022-07-04 RX ADMIN — Medication 137 MICROGRAM(S): at 05:54

## 2022-07-04 RX ADMIN — Medication 10 MILLIGRAM(S): at 17:18

## 2022-07-04 RX ADMIN — Medication 100 MILLIGRAM(S): at 05:54

## 2022-07-04 RX ADMIN — ESCITALOPRAM OXALATE 5 MILLIGRAM(S): 10 TABLET, FILM COATED ORAL at 13:35

## 2022-07-04 RX ADMIN — APIXABAN 5 MILLIGRAM(S): 2.5 TABLET, FILM COATED ORAL at 05:54

## 2022-07-04 RX ADMIN — BUMETANIDE 2 MILLIGRAM(S): 0.25 INJECTION INTRAMUSCULAR; INTRAVENOUS at 05:54

## 2022-07-04 RX ADMIN — Medication 81 MILLIGRAM(S): at 13:36

## 2022-07-04 RX ADMIN — APIXABAN 5 MILLIGRAM(S): 2.5 TABLET, FILM COATED ORAL at 17:18

## 2022-07-04 RX ADMIN — SPIRONOLACTONE 25 MILLIGRAM(S): 25 TABLET, FILM COATED ORAL at 05:54

## 2022-07-04 NOTE — PROGRESS NOTE ADULT - ASSESSMENT
77  year old male    h/o   , CAD s/p stent on ASA, A-fib/ PPM, , hypothyroid, and total left knee replacement    s/p rash across the upper torso/ back  for past week, pruritic on R arm, and rash in the groin     prior  PPM  interrogation  with  WCT/   s/p  brief   bursts  of  afib/ , on prior visit   Ct chest, retrosternal hematoma.  mod left pl effusion          admitted with sob, / pedal  edema/ scrotal edema  from acute  diastolic  chf, related  to non compliance  with his  lasix     c/c   lymphedema,  carla left  leg   *  CAD/ AFIb  /PPM,/ HTN   , stent in 2007   *  h/o  AFIB,  was  not  on  a/c    * Anemia, , hb stable, had  been seen by  gi  eval dr joann sanchez in past    right leg with distal redness.  not cellulitis / c/c  for past few  months  an d left leg lymphedema      * Echo, normal  ef/ severe MR .  s/p  MVR    cath, with 2 vessel disease,  s/p  CABG and  MVR  surg d r marni   *  h/o  Afib on    eliquis  on asa.  lipitor, torpol,  synthroid    c/c leg redness/  4 +  edema, on keflex, has  improved     h/o non compalince  with meds    spoke  wth wife  at  length   was  on iv  bumex/  aldactone/ has lost 9  kg   on po bumex  now/   echo pending/  d/c  when  cleared  by  card  mild  peter/ follwo  crt in am         f/p wih  dr mikayla dick

## 2022-07-05 LAB
ANION GAP SERPL CALC-SCNC: 8 MMOL/L — SIGNIFICANT CHANGE UP (ref 5–17)
BUN SERPL-MCNC: 38 MG/DL — HIGH (ref 7–23)
CALCIUM SERPL-MCNC: 9.2 MG/DL — SIGNIFICANT CHANGE UP (ref 8.4–10.5)
CHLORIDE SERPL-SCNC: 98 MMOL/L — SIGNIFICANT CHANGE UP (ref 96–108)
CO2 SERPL-SCNC: 32 MMOL/L — HIGH (ref 22–31)
CREAT SERPL-MCNC: 1.43 MG/DL — HIGH (ref 0.5–1.3)
EGFR: 51 ML/MIN/1.73M2 — LOW
GLUCOSE SERPL-MCNC: 90 MG/DL — SIGNIFICANT CHANGE UP (ref 70–99)
POTASSIUM SERPL-MCNC: 4 MMOL/L — SIGNIFICANT CHANGE UP (ref 3.5–5.3)
POTASSIUM SERPL-SCNC: 4 MMOL/L — SIGNIFICANT CHANGE UP (ref 3.5–5.3)
SARS-COV-2 RNA SPEC QL NAA+PROBE: SIGNIFICANT CHANGE UP
SODIUM SERPL-SCNC: 138 MMOL/L — SIGNIFICANT CHANGE UP (ref 135–145)

## 2022-07-05 PROCEDURE — 93306 TTE W/DOPPLER COMPLETE: CPT | Mod: 26

## 2022-07-05 RX ADMIN — APIXABAN 5 MILLIGRAM(S): 2.5 TABLET, FILM COATED ORAL at 18:01

## 2022-07-05 RX ADMIN — SPIRONOLACTONE 25 MILLIGRAM(S): 25 TABLET, FILM COATED ORAL at 05:45

## 2022-07-05 RX ADMIN — Medication 100 MILLIGRAM(S): at 08:37

## 2022-07-05 RX ADMIN — ESCITALOPRAM OXALATE 5 MILLIGRAM(S): 10 TABLET, FILM COATED ORAL at 11:53

## 2022-07-05 RX ADMIN — Medication 81 MILLIGRAM(S): at 11:53

## 2022-07-05 RX ADMIN — APIXABAN 5 MILLIGRAM(S): 2.5 TABLET, FILM COATED ORAL at 05:45

## 2022-07-05 RX ADMIN — Medication 137 MICROGRAM(S): at 05:45

## 2022-07-05 RX ADMIN — Medication 20 MILLIGRAM(S): at 18:01

## 2022-07-06 LAB
ANION GAP SERPL CALC-SCNC: 10 MMOL/L — SIGNIFICANT CHANGE UP (ref 5–17)
APTT BLD: 183.3 SEC — CRITICAL HIGH (ref 27.5–35.5)
BUN SERPL-MCNC: 46 MG/DL — HIGH (ref 7–23)
CALCIUM SERPL-MCNC: 9.3 MG/DL — SIGNIFICANT CHANGE UP (ref 8.4–10.5)
CHLORIDE SERPL-SCNC: 98 MMOL/L — SIGNIFICANT CHANGE UP (ref 96–108)
CO2 SERPL-SCNC: 32 MMOL/L — HIGH (ref 22–31)
CREAT SERPL-MCNC: 1.36 MG/DL — HIGH (ref 0.5–1.3)
EGFR: 54 ML/MIN/1.73M2 — LOW
GLUCOSE BLDC GLUCOMTR-MCNC: 108 MG/DL — HIGH (ref 70–99)
GLUCOSE SERPL-MCNC: 98 MG/DL — SIGNIFICANT CHANGE UP (ref 70–99)
HCT VFR BLD CALC: 35.3 % — LOW (ref 39–50)
HGB BLD-MCNC: 11.6 G/DL — LOW (ref 13–17)
MCHC RBC-ENTMCNC: 29.1 PG — SIGNIFICANT CHANGE UP (ref 27–34)
MCHC RBC-ENTMCNC: 32.9 GM/DL — SIGNIFICANT CHANGE UP (ref 32–36)
MCV RBC AUTO: 88.5 FL — SIGNIFICANT CHANGE UP (ref 80–100)
NRBC # BLD: 0 /100 WBCS — SIGNIFICANT CHANGE UP (ref 0–0)
PLATELET # BLD AUTO: 159 K/UL — SIGNIFICANT CHANGE UP (ref 150–400)
POTASSIUM SERPL-MCNC: 4 MMOL/L — SIGNIFICANT CHANGE UP (ref 3.5–5.3)
POTASSIUM SERPL-SCNC: 4 MMOL/L — SIGNIFICANT CHANGE UP (ref 3.5–5.3)
RBC # BLD: 3.99 M/UL — LOW (ref 4.2–5.8)
RBC # FLD: 14.1 % — SIGNIFICANT CHANGE UP (ref 10.3–14.5)
SODIUM SERPL-SCNC: 140 MMOL/L — SIGNIFICANT CHANGE UP (ref 135–145)
WBC # BLD: 4.62 K/UL — SIGNIFICANT CHANGE UP (ref 3.8–10.5)
WBC # FLD AUTO: 4.62 K/UL — SIGNIFICANT CHANGE UP (ref 3.8–10.5)

## 2022-07-06 RX ORDER — HEPARIN SODIUM 5000 [USP'U]/ML
INJECTION INTRAVENOUS; SUBCUTANEOUS
Qty: 25000 | Refills: 0 | Status: DISCONTINUED | OUTPATIENT
Start: 2022-07-06 | End: 2022-07-07

## 2022-07-06 RX ORDER — HEPARIN SODIUM 5000 [USP'U]/ML
9000 INJECTION INTRAVENOUS; SUBCUTANEOUS EVERY 6 HOURS
Refills: 0 | Status: DISCONTINUED | OUTPATIENT
Start: 2022-07-06 | End: 2022-07-08

## 2022-07-06 RX ORDER — HEPARIN SODIUM 5000 [USP'U]/ML
4500 INJECTION INTRAVENOUS; SUBCUTANEOUS EVERY 6 HOURS
Refills: 0 | Status: DISCONTINUED | OUTPATIENT
Start: 2022-07-06 | End: 2022-07-08

## 2022-07-06 RX ADMIN — Medication 100 MILLIGRAM(S): at 17:59

## 2022-07-06 RX ADMIN — ESCITALOPRAM OXALATE 5 MILLIGRAM(S): 10 TABLET, FILM COATED ORAL at 12:04

## 2022-07-06 RX ADMIN — HEPARIN SODIUM 2000 UNIT(S)/HR: 5000 INJECTION INTRAVENOUS; SUBCUTANEOUS at 11:01

## 2022-07-06 RX ADMIN — Medication 137 MICROGRAM(S): at 05:09

## 2022-07-06 RX ADMIN — HEPARIN SODIUM 1600 UNIT(S)/HR: 5000 INJECTION INTRAVENOUS; SUBCUTANEOUS at 19:41

## 2022-07-06 RX ADMIN — APIXABAN 5 MILLIGRAM(S): 2.5 TABLET, FILM COATED ORAL at 05:09

## 2022-07-06 RX ADMIN — Medication 20 MILLIGRAM(S): at 05:09

## 2022-07-06 RX ADMIN — HEPARIN SODIUM 0 UNIT(S)/HR: 5000 INJECTION INTRAVENOUS; SUBCUTANEOUS at 17:54

## 2022-07-06 RX ADMIN — Medication 81 MILLIGRAM(S): at 12:03

## 2022-07-06 RX ADMIN — HEPARIN SODIUM 1600 UNIT(S)/HR: 5000 INJECTION INTRAVENOUS; SUBCUTANEOUS at 19:00

## 2022-07-06 NOTE — PROGRESS NOTE ADULT - ASSESSMENT
77  year old male    h/o   , CAD s/p stent on ASA, A-fib/ PPM, , hypothyroid, and total left knee replacement    s/p rash across the upper torso/ back  for past week, pruritic on R arm, and rash in the groin     prior  PPM  interrogation  with  WCT/   s/p  brief   bursts  of  afib/ , on prior visit   Ct chest, retrosternal hematoma.  mod left pl effusion          admitted with sob, / pedal  edema/ scrotal edema  from acute  diastolic  chf, related  to non compliance  with his  lasix     c/c   lymphedema,  carla left  leg   *  CAD/ AFIb  /PPM,/ HTN   , stent in 2007   *  h/o  AFIB,  was  not  on  a/c    * Anemia, , hb stable, had  been seen by  gi  eval dr joann sanchez in past    right leg with distal redness.  not cellulitis / c/c  for past few  months  an d left leg lymphedema      * Echo, normal  ef/ severe MR .  s/p  MVR    cath, with 2 vessel disease,  s/p  CABG and  MVR  surg d r marni   *  h/o  Afib on    eliquis  on asa.  lipitor, torpol,  synthroid    c/c leg redness/  4 +  edema, on keflex, has  improved     h/o non compalince  with meds    spoke  wth wife  at  length   was  on iv  bumex/  aldactone/ has lost 9  kg   on po  toresemide  now  mild  peter/ follow  crt   echo,  ef  35/ new/ severe  TR/ card f/p  plan.  stress  test/ may  need  acth/  hold  eliquis         f/p wih  dr mikayla dick

## 2022-07-07 ENCOUNTER — APPOINTMENT (OUTPATIENT)
Dept: VASCULAR SURGERY | Facility: CLINIC | Age: 77
End: 2022-07-07

## 2022-07-07 LAB
ANION GAP SERPL CALC-SCNC: 13 MMOL/L — SIGNIFICANT CHANGE UP (ref 5–17)
APTT BLD: 101 SEC — HIGH (ref 27.5–35.5)
APTT BLD: 165.2 SEC — CRITICAL HIGH (ref 27.5–35.5)
BUN SERPL-MCNC: 50 MG/DL — HIGH (ref 7–23)
CALCIUM SERPL-MCNC: 9.7 MG/DL — SIGNIFICANT CHANGE UP (ref 8.4–10.5)
CHLORIDE SERPL-SCNC: 98 MMOL/L — SIGNIFICANT CHANGE UP (ref 96–108)
CO2 SERPL-SCNC: 30 MMOL/L — SIGNIFICANT CHANGE UP (ref 22–31)
CREAT SERPL-MCNC: 1.31 MG/DL — HIGH (ref 0.5–1.3)
EGFR: 56 ML/MIN/1.73M2 — LOW
GLUCOSE SERPL-MCNC: 126 MG/DL — HIGH (ref 70–99)
HCT VFR BLD CALC: 39.4 % — SIGNIFICANT CHANGE UP (ref 39–50)
HGB BLD-MCNC: 12.7 G/DL — LOW (ref 13–17)
MCHC RBC-ENTMCNC: 29.1 PG — SIGNIFICANT CHANGE UP (ref 27–34)
MCHC RBC-ENTMCNC: 32.2 GM/DL — SIGNIFICANT CHANGE UP (ref 32–36)
MCV RBC AUTO: 90.2 FL — SIGNIFICANT CHANGE UP (ref 80–100)
NRBC # BLD: 0 /100 WBCS — SIGNIFICANT CHANGE UP (ref 0–0)
PLATELET # BLD AUTO: 162 K/UL — SIGNIFICANT CHANGE UP (ref 150–400)
POTASSIUM SERPL-MCNC: 4.4 MMOL/L — SIGNIFICANT CHANGE UP (ref 3.5–5.3)
POTASSIUM SERPL-SCNC: 4.4 MMOL/L — SIGNIFICANT CHANGE UP (ref 3.5–5.3)
RBC # BLD: 4.37 M/UL — SIGNIFICANT CHANGE UP (ref 4.2–5.8)
RBC # FLD: 14.2 % — SIGNIFICANT CHANGE UP (ref 10.3–14.5)
SODIUM SERPL-SCNC: 141 MMOL/L — SIGNIFICANT CHANGE UP (ref 135–145)
WBC # BLD: 4.84 K/UL — SIGNIFICANT CHANGE UP (ref 3.8–10.5)
WBC # FLD AUTO: 4.84 K/UL — SIGNIFICANT CHANGE UP (ref 3.8–10.5)

## 2022-07-07 PROCEDURE — 99152 MOD SED SAME PHYS/QHP 5/>YRS: CPT

## 2022-07-07 PROCEDURE — 93455 CORONARY ART/GRFT ANGIO S&I: CPT | Mod: 26

## 2022-07-07 RX ORDER — HEPARIN SODIUM 5000 [USP'U]/ML
900 INJECTION INTRAVENOUS; SUBCUTANEOUS
Qty: 25000 | Refills: 0 | Status: DISCONTINUED | OUTPATIENT
Start: 2022-07-08 | End: 2022-07-08

## 2022-07-07 RX ADMIN — Medication 81 MILLIGRAM(S): at 11:56

## 2022-07-07 RX ADMIN — Medication 20 MILLIGRAM(S): at 06:27

## 2022-07-07 RX ADMIN — HEPARIN SODIUM 1200 UNIT(S)/HR: 5000 INJECTION INTRAVENOUS; SUBCUTANEOUS at 03:19

## 2022-07-07 RX ADMIN — HEPARIN SODIUM 0 UNIT(S)/HR: 5000 INJECTION INTRAVENOUS; SUBCUTANEOUS at 02:14

## 2022-07-07 RX ADMIN — Medication 137 MICROGRAM(S): at 06:27

## 2022-07-07 RX ADMIN — HEPARIN SODIUM 1000 UNIT(S)/HR: 5000 INJECTION INTRAVENOUS; SUBCUTANEOUS at 11:39

## 2022-07-07 RX ADMIN — Medication 100 MILLIGRAM(S): at 06:27

## 2022-07-07 RX ADMIN — SPIRONOLACTONE 25 MILLIGRAM(S): 25 TABLET, FILM COATED ORAL at 06:27

## 2022-07-07 RX ADMIN — HEPARIN SODIUM 1600 UNIT(S)/HR: 5000 INJECTION INTRAVENOUS; SUBCUTANEOUS at 00:53

## 2022-07-07 RX ADMIN — ESCITALOPRAM OXALATE 5 MILLIGRAM(S): 10 TABLET, FILM COATED ORAL at 11:56

## 2022-07-07 NOTE — PROVIDER CONTACT NOTE (CRITICAL VALUE NOTIFICATION) - BACKGROUND
Pt admitted for CHF exacerbation. A fib on heparin gtt
pt came in for CHF exacerbation. pmh of htn, Afib, and CAD.

## 2022-07-07 NOTE — PROGRESS NOTE ADULT - ASSESSMENT
77  year old male    h/o   , CAD s/p stent on ASA, A-fib/ PPM, , hypothyroid, and total left knee replacement    s/p rash across the upper torso/ back  for past week, pruritic on R arm, and rash in the groin     prior  PPM  interrogation  with  WCT/   s/p  brief   bursts  of  afib/ , on prior visit   Ct chest, retrosternal hematoma.  mod left pl effusion          admitted with sob, / pedal  edema/ scrotal edema  from acute  diastolic  chf, related  to non compliance  with his  lasix     c/c   lymphedema,  carla left  leg   *  CAD/ AFIb  /PPM,/ HTN   , stent in 2007   *  h/o  AFIB,  was  not  on  a/c    * Anemia, , hb stable, had  been seen by  gi  eval dr joann sanchez in past    right leg with distal redness.  not cellulitis / c/c  for past few  months  an d left leg lymphedema      * Echo, normal  ef/ severe MR .  s/p  MVR    cath, with 2 vessel disease,  s/p  CABG and  MVR  surg d r marni   *  h/o  Afib on    eliquis  on asa.  lipitor, torpol,  synthroid    c/c leg redness/  4 +  edema, on keflex, has  improved     h/o non compalince  with meds    spoke  wth wife  at  length   was  on iv  bumex/  aldactone/ has lost 9  kg   on po  toresemide  now  mild  peter/ follow  crt   echo,  ef  35/ new/ severe  TR/ card f/p    eliquis  on hold.  cath today on iv heparin noiw         f/p wih  dr mikayla dick

## 2022-07-07 NOTE — CHART NOTE - NSCHARTNOTEFT_GEN_A_CORE
RD CHF education chart note    Patient was visited by RD for CHF education. Heart failure education provided to the patient in detail. Discussed heart failure nutrition therapy, sodium and fluid intake, importance of diet adherence, daily weights monitoring with the patient. Reinforced importance of weight gain parameters and importance of contacting MD’s about weight changes. Provided handouts on heart failure nutrition therapy, reading heart healthy nutrition labels, heart healthy shopping tips and low sodium food list. Patient verbalized understanding demonstrated by teach back method. RD contact information left with patient for any future questioning.    Hetal Duggan RD CDN #[phone]

## 2022-07-08 ENCOUNTER — TRANSCRIPTION ENCOUNTER (OUTPATIENT)
Age: 77
End: 2022-07-08

## 2022-07-08 VITALS
OXYGEN SATURATION: 95 % | RESPIRATION RATE: 18 BRPM | DIASTOLIC BLOOD PRESSURE: 61 MMHG | HEART RATE: 61 BPM | TEMPERATURE: 98 F | SYSTOLIC BLOOD PRESSURE: 104 MMHG

## 2022-07-08 LAB
ANION GAP SERPL CALC-SCNC: 11 MMOL/L — SIGNIFICANT CHANGE UP (ref 5–17)
APTT BLD: 46.3 SEC — HIGH (ref 27.5–35.5)
BUN SERPL-MCNC: 55 MG/DL — HIGH (ref 7–23)
CALCIUM SERPL-MCNC: 9.8 MG/DL — SIGNIFICANT CHANGE UP (ref 8.4–10.5)
CHLORIDE SERPL-SCNC: 100 MMOL/L — SIGNIFICANT CHANGE UP (ref 96–108)
CO2 SERPL-SCNC: 29 MMOL/L — SIGNIFICANT CHANGE UP (ref 22–31)
CREAT SERPL-MCNC: 1.52 MG/DL — HIGH (ref 0.5–1.3)
EGFR: 47 ML/MIN/1.73M2 — LOW
GLUCOSE SERPL-MCNC: 103 MG/DL — HIGH (ref 70–99)
HCT VFR BLD CALC: 37.2 % — LOW (ref 39–50)
HGB BLD-MCNC: 12.1 G/DL — LOW (ref 13–17)
MCHC RBC-ENTMCNC: 29.5 PG — SIGNIFICANT CHANGE UP (ref 27–34)
MCHC RBC-ENTMCNC: 32.5 GM/DL — SIGNIFICANT CHANGE UP (ref 32–36)
MCV RBC AUTO: 90.7 FL — SIGNIFICANT CHANGE UP (ref 80–100)
NRBC # BLD: 0 /100 WBCS — SIGNIFICANT CHANGE UP (ref 0–0)
PLATELET # BLD AUTO: 158 K/UL — SIGNIFICANT CHANGE UP (ref 150–400)
POTASSIUM SERPL-MCNC: 4.2 MMOL/L — SIGNIFICANT CHANGE UP (ref 3.5–5.3)
POTASSIUM SERPL-SCNC: 4.2 MMOL/L — SIGNIFICANT CHANGE UP (ref 3.5–5.3)
RBC # BLD: 4.1 M/UL — LOW (ref 4.2–5.8)
RBC # FLD: 14.2 % — SIGNIFICANT CHANGE UP (ref 10.3–14.5)
SODIUM SERPL-SCNC: 140 MMOL/L — SIGNIFICANT CHANGE UP (ref 135–145)
WBC # BLD: 4.83 K/UL — SIGNIFICANT CHANGE UP (ref 3.8–10.5)
WBC # FLD AUTO: 4.83 K/UL — SIGNIFICANT CHANGE UP (ref 3.8–10.5)

## 2022-07-08 PROCEDURE — 85730 THROMBOPLASTIN TIME PARTIAL: CPT

## 2022-07-08 PROCEDURE — 87086 URINE CULTURE/COLONY COUNT: CPT

## 2022-07-08 PROCEDURE — 97162 PT EVAL MOD COMPLEX 30 MIN: CPT

## 2022-07-08 PROCEDURE — 83880 ASSAY OF NATRIURETIC PEPTIDE: CPT

## 2022-07-08 PROCEDURE — 80053 COMPREHEN METABOLIC PANEL: CPT

## 2022-07-08 PROCEDURE — 93455 CORONARY ART/GRFT ANGIO S&I: CPT

## 2022-07-08 PROCEDURE — U0005: CPT

## 2022-07-08 PROCEDURE — C1769: CPT

## 2022-07-08 PROCEDURE — 83735 ASSAY OF MAGNESIUM: CPT

## 2022-07-08 PROCEDURE — 85025 COMPLETE CBC W/AUTO DIFF WBC: CPT

## 2022-07-08 PROCEDURE — 82962 GLUCOSE BLOOD TEST: CPT

## 2022-07-08 PROCEDURE — C1894: CPT

## 2022-07-08 PROCEDURE — 36415 COLL VENOUS BLD VENIPUNCTURE: CPT

## 2022-07-08 PROCEDURE — U0003: CPT

## 2022-07-08 PROCEDURE — 85610 PROTHROMBIN TIME: CPT

## 2022-07-08 PROCEDURE — 80048 BASIC METABOLIC PNL TOTAL CA: CPT

## 2022-07-08 PROCEDURE — C8929: CPT

## 2022-07-08 PROCEDURE — 85027 COMPLETE CBC AUTOMATED: CPT

## 2022-07-08 PROCEDURE — 99285 EMERGENCY DEPT VISIT HI MDM: CPT | Mod: 25

## 2022-07-08 PROCEDURE — 81001 URINALYSIS AUTO W/SCOPE: CPT

## 2022-07-08 PROCEDURE — C1887: CPT

## 2022-07-08 PROCEDURE — 84484 ASSAY OF TROPONIN QUANT: CPT

## 2022-07-08 PROCEDURE — 87637 SARSCOV2&INF A&B&RSV AMP PRB: CPT

## 2022-07-08 PROCEDURE — 93005 ELECTROCARDIOGRAM TRACING: CPT

## 2022-07-08 PROCEDURE — 84100 ASSAY OF PHOSPHORUS: CPT

## 2022-07-08 PROCEDURE — 71045 X-RAY EXAM CHEST 1 VIEW: CPT

## 2022-07-08 RX ORDER — APIXABAN 2.5 MG/1
1 TABLET, FILM COATED ORAL
Qty: 60 | Refills: 0
Start: 2022-07-08 | End: 2022-08-06

## 2022-07-08 RX ORDER — APIXABAN 2.5 MG/1
5 TABLET, FILM COATED ORAL EVERY 12 HOURS
Refills: 0 | Status: DISCONTINUED | OUTPATIENT
Start: 2022-07-08 | End: 2022-07-08

## 2022-07-08 RX ORDER — SPIRONOLACTONE 25 MG/1
1 TABLET, FILM COATED ORAL
Qty: 30 | Refills: 0
Start: 2022-07-08 | End: 2022-08-06

## 2022-07-08 RX ORDER — WARFARIN SODIUM 2.5 MG/1
1 TABLET ORAL
Qty: 0 | Refills: 0 | DISCHARGE

## 2022-07-08 RX ADMIN — HEPARIN SODIUM 900 UNIT(S)/HR: 5000 INJECTION INTRAVENOUS; SUBCUTANEOUS at 00:02

## 2022-07-08 RX ADMIN — SPIRONOLACTONE 25 MILLIGRAM(S): 25 TABLET, FILM COATED ORAL at 05:32

## 2022-07-08 RX ADMIN — Medication 137 MICROGRAM(S): at 05:30

## 2022-07-08 RX ADMIN — ESCITALOPRAM OXALATE 5 MILLIGRAM(S): 10 TABLET, FILM COATED ORAL at 12:12

## 2022-07-08 RX ADMIN — Medication 100 MILLIGRAM(S): at 05:30

## 2022-07-08 RX ADMIN — Medication 20 MILLIGRAM(S): at 05:30

## 2022-07-08 RX ADMIN — Medication 81 MILLIGRAM(S): at 12:12

## 2022-07-08 NOTE — DISCHARGE NOTE NURSING/CASE MANAGEMENT/SOCIAL WORK - NSDCFUADDAPPT_GEN_ALL_CORE_FT
APPTS ARE READY TO BE MADE: [ X] YES    Best Family or Patient Contact (if needed):    Additional Information about above appointments (if needed):    1: Dr. Lainez  2:   3:     Other comments or requests:

## 2022-07-08 NOTE — DISCHARGE NOTE PROVIDER - CARE PROVIDER_API CALL
Roni Lainez  CARDIOVASCULAR DISEASE  [address] [place] [id number]  Phone: [phone]  Fax: [phone]  Follow Up Time: 1 week   Roni Lainez  CARDIOVASCULAR DISEASE  [address] [place] [id number]  Phone: [phone]  Fax: [phone]  Follow Up Time: 1 week    Earle Rush)  Cardiovascular Disease; Interventional Cardiology  [address] [place] [address]  Phone: [phone]  Fax: [phone]  Follow Up Time:

## 2022-07-08 NOTE — DISCHARGE NOTE PROVIDER - PROVIDER TOKENS
PROVIDER:[TOKEN:[[id number]],FOLLOWUP:[1 week]] PROVIDER:[TOKEN:[[id number]],FOLLOWUP:[1 week]],PROVIDER:[TOKEN:[[id number]]]

## 2022-07-08 NOTE — DISCHARGE NOTE NURSING/CASE MANAGEMENT/SOCIAL WORK - NSDCPEFALRISK_GEN_ALL_CORE
For information on Fall & Injury Prevention, visit: [email or web address] OR  [email or web address] OR  https://www.cdc.gov/steadi/patient.html

## 2022-07-08 NOTE — DISCHARGE NOTE NURSING/CASE MANAGEMENT/SOCIAL WORK - PATIENT PORTAL LINK FT
You can access the FollowMyHealth Patient Portal offered by [facility] by registering at the following website: [email or web address]. By joining [employer]’s FollowMyHealth portal, you will also be able to view your health information using other applications (apps) compatible with our system.

## 2022-07-08 NOTE — PROGRESS NOTE ADULT - SUBJECTIVE AND OBJECTIVE BOX
CARDIOLOGY     PROGRESS  NOTE   ________________________________________________    CHIEF COMPLAINT:Patient is a 77y old  Male who presents with a chief complaint of sob (02 Jul 2022 11:03)  no complain.  	  REVIEW OF SYSTEMS:  CONSTITUTIONAL: No fever, weight loss, or fatigue  EYES: No eye pain, visual disturbances, or discharge  ENT:  No difficulty hearing, tinnitus, vertigo; No sinus or throat pain  NECK: No pain or stiffness  RESPIRATORY: No cough, wheezing, chills or hemoptysis; decrease Shortness of Breath  CARDIOVASCULAR: No chest pain, palpitations, passing out, dizziness, or leg swelling  GASTROINTESTINAL: No abdominal or epigastric pain. No nausea, vomiting, or hematemesis; No diarrhea or constipation. No melena or hematochezia.  GENITOURINARY: No dysuria, frequency, hematuria, or incontinence  NEUROLOGICAL: No headaches, memory loss, loss of strength, numbness, or tremors  SKIN: No itching, burning, rashes, or lesions   LYMPH Nodes: No enlarged glands  ENDOCRINE: No heat or cold intolerance; No hair loss  MUSCULOSKELETAL: No joint pain or swelling; No muscle, back, or extremity pain  PSYCHIATRIC: No depression, anxiety, mood swings, or difficulty sleeping  HEME/LYMPH: No easy bruising, or bleeding gums  ALLERGY AND IMMUNOLOGIC: No hives or eczema	    [ ] All others negative	  [ ] Unable to obtain    PHYSICAL EXAM:  T(C): 36.4 (07-03-22 @ 05:37), Max: 36.4 (07-02-22 @ 12:52)  HR: 63 (07-03-22 @ 05:37) (62 - 75)  BP: 129/63 (07-03-22 @ 05:37) (123/71 - 137/79)  RR: 18 (07-03-22 @ 05:37) (17 - 18)  SpO2: 91% (07-03-22 @ 05:37) (91% - 98%)  Wt(kg): --  I&O's Summary    02 Jul 2022 07:01  -  03 Jul 2022 07:00  --------------------------------------------------------  IN: 0 mL / OUT: 2550 mL / NET: -2550 mL        Appearance: Normal	  HEENT:   Normal oral mucosa, PERRL, EOMI	  Lymphatic: No lymphadenopathy  Cardiovascular: Normal S1 S2, No JVD, + murmurs, decrease  edema  Respiratory: Lungs clear to auscultation	  Psychiatry: A & O x 3, Mood & affect appropriate  Gastrointestinal:  Soft, Non-tender, + BS	  Skin: No rashes, No ecchymoses, No cyanosis	  Neurologic: Non-focal  Extremities: Normal range of motion, No clubbing, cyanosis , decrease  edema  Vascular: Peripheral pulses palpable 2+ bilaterally    MEDICATIONS  (STANDING):  apixaban 5 milliGRAM(s) Oral every 12 hours  aspirin enteric coated 81 milliGRAM(s) Oral daily  buMETAnide Injectable 1 milliGRAM(s) IV Push two times a day  cephalexin 500 milliGRAM(s) Oral three times a day  escitalopram 5 milliGRAM(s) Oral daily  levothyroxine 137 MICROGram(s) Oral daily  metoprolol succinate  milliGRAM(s) Oral daily  spironolactone 25 milliGRAM(s) Oral daily      TELEMETRY: 	    ECG:  	  RADIOLOGY:  OTHER: 	  	  LABS:	 	    CARDIAC MARKERS:            07-03    142  |  99  |  27<H>  ----------------------------<  99  4.4   |  34<H>  |  1.15    Ca    9.2      03 Jul 2022 06:35  Phos  3.4     07-03  Mg     1.9     07-03      proBNP: Serum Pro-Brain Natriuretic Peptide: 1983 pg/mL (06-28 @ 17:42)    Lipid Profile:   HgA1c:   TSH:     < from: Limited Transthoracic Echo (w/Cont) (09.14.21 @ 16:55) >  1. Patient in atrial fibrillation; ejection fracion varies  with R-R interval. Endocardial visualization enhanced with  intravenous injection of Ultrasonic Enhancing Agent  (Definity). Grossly normal left ventricular systolic  function. Septal motion consistent with prior cardiac  surgery.  2. Right ventricle not well visualized; right ventricle  appears enlarged with decreased right ventricular systolic  function.  A device wire is noted in the right heart.  3. Thickened pericardium. Small, organized pericardial  effusion predominantly seen anterior to the right  ventricle. The effusion measures up to approximately 1.0 cm  anterior to the right ventricle. Subcostal images are very  limited. No obvious echocardiographic evidence of  pericardial tamponade.          Assessment and plan  ---------------------------  78 y/o male PMHx HTN, HLD, CAD s/p stent on ASA, A-fib, hypothyroid, CHF now presenting to the ED worsening SOB, DUNCAN, peripheral edema, scrotal edema and 20 pound unintentional weight gain over last month. Patient reported non compliance with diuretics as he had doctors appts to attend to and did not want to persistently have to void. Patient is experiencing difficulty voiding 2/2 scrotal edema. 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  oob to chair  increase ambulation  change bumex to po    	        
           CARDIOLOGY     PROGRESS  NOTE   ________________________________________________    CHIEF COMPLAINT:Patient is a 77y old  Male who presents with a chief complaint of sob (04 Jul 2022 10:20)  doing better.  	  REVIEW OF SYSTEMS:  CONSTITUTIONAL: No fever, weight loss, or fatigue  EYES: No eye pain, visual disturbances, or discharge  ENT:  No difficulty hearing, tinnitus, vertigo; No sinus or throat pain  NECK: No pain or stiffness  RESPIRATORY: No cough, wheezing, chills or hemoptysis; No Shortness of Breath  CARDIOVASCULAR: No chest pain, palpitations, passing out, dizziness, or leg swelling  GASTROINTESTINAL: No abdominal or epigastric pain. No nausea, vomiting, or hematemesis; No diarrhea or constipation. No melena or hematochezia.  GENITOURINARY: No dysuria, frequency, hematuria, or incontinence  NEUROLOGICAL: No headaches, memory loss, loss of strength, numbness, or tremors  SKIN: No itching, burning, rashes, or lesions   LYMPH Nodes: No enlarged glands  ENDOCRINE: No heat or cold intolerance; No hair loss  MUSCULOSKELETAL: No joint pain or swelling; No muscle, back, or extremity pain  PSYCHIATRIC: No depression, anxiety, mood swings, or difficulty sleeping  HEME/LYMPH: No easy bruising, or bleeding gums  ALLERGY AND IMMUNOLOGIC: No hives or eczema	    [ ] All others negative	  [ ] Unable to obtain    PHYSICAL EXAM:  T(C): 36.6 (07-04-22 @ 05:19), Max: 36.6 (07-04-22 @ 05:19)  HR: 72 (07-04-22 @ 05:19) (65 - 72)  BP: 126/67 (07-04-22 @ 05:19) (110/64 - 126/67)  RR: 18 (07-04-22 @ 05:19) (18 - 18)  SpO2: 97% (07-04-22 @ 05:19) (96% - 97%)  Wt(kg): --  I&O's Summary    03 Jul 2022 07:01  -  04 Jul 2022 07:00  --------------------------------------------------------  IN: 0 mL / OUT: 3601 mL / NET: -3601 mL    04 Jul 2022 07:01  -  04 Jul 2022 11:02  --------------------------------------------------------  IN: 460 mL / OUT: 850 mL / NET: -390 mL        Appearance: Normal	  HEENT:   Normal oral mucosa, PERRL, EOMI	  Lymphatic: No lymphadenopathy  Cardiovascular: Normal S1 S2, No JVD, + murmurs, No edema  Respiratory: rhonchi  Psychiatry: A & O x 3, Mood & affect appropriate  Gastrointestinal:  Soft, Non-tender, + BS	  Skin: No rashes, No ecchymoses, No cyanosis	  Neurologic: Non-focal  Extremities: Normal range of motion, No clubbing, cyanosis or edema  Vascular: Peripheral pulses palpable 2+ bilaterally    MEDICATIONS  (STANDING):  apixaban 5 milliGRAM(s) Oral every 12 hours  aspirin enteric coated 81 milliGRAM(s) Oral daily  buMETAnide 2 milliGRAM(s) Oral daily  escitalopram 5 milliGRAM(s) Oral daily  levothyroxine 137 MICROGram(s) Oral daily  metoprolol succinate  milliGRAM(s) Oral daily  spironolactone 25 milliGRAM(s) Oral daily      TELEMETRY: 	    ECG:  	  RADIOLOGY:  OTHER: 	  	  LABS:	 	    CARDIAC MARKERS:            07-04    140  |  98  |  34<H>  ----------------------------<  89  3.8   |  34<H>  |  1.35<H>    Ca    9.1      04 Jul 2022 06:54  Phos  3.4     07-04  Mg     2.0     07-04      proBNP: Serum Pro-Brain Natriuretic Peptide: 1983 pg/mL (06-28 @ 17:42)    Lipid Profile:   HgA1c:   TSH:   < from: Limited Transthoracic Echo (w/Cont) (09.14.21 @ 16:55) >  1. Patient in atrial fibrillation; ejection fracion varies  with R-R interval. Endocardial visualization enhanced with  intravenous injection of Ultrasonic Enhancing Agent  (Definity). Grossly normal left ventricular systolic  function. Septal motion consistent with prior cardiac  surgery.  2. Right ventricle not well visualized; right ventricle  appears enlarged with decreased right ventricular systolic  function.  A device wire is noted in the right heart.  3. Thickened pericardium. Small, organized pericardial  effusion predominantly seen anterior to the right  ventricle. The effusion measures up to approximately 1.0 cm  anterior to the right ventricle. Subcostal images are very  limited. No obvious echocardiographic evidence of  pericardial tamponade.          Assessment and plan  ---------------------------  78 y/o male PMHx HTN, HLD, CAD s/p stent on ASA, A-fib, hypothyroid, CHF now presenting to the ED worsening SOB, DUNCAN, peripheral edema, scrotal edema and 20 pound unintentional weight gain over last month. Patient reported non compliance with diuretics as he had doctors appts to attend to and did not want to persistently have to void. Patient is experiencing difficulty voiding 2/2 scrotal edema. 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  oob to chair  increase ambulation  change bumex to po, will decrease the dose    	        
           CARDIOLOGY     PROGRESS  NOTE   ________________________________________________    CHIEF COMPLAINT:Patient is a 77y old  Male who presents with a chief complaint of sob (05 Jul 2022 12:33)  doing better.  	  REVIEW OF SYSTEMS:  CONSTITUTIONAL: No fever, weight loss, or fatigue  EYES: No eye pain, visual disturbances, or discharge  ENT:  No difficulty hearing, tinnitus, vertigo; No sinus or throat pain  NECK: No pain or stiffness  RESPIRATORY: No cough, wheezing, chills or hemoptysis; No Shortness of Breath  CARDIOVASCULAR: No chest pain, palpitations, passing out, dizziness, or leg swelling  GASTROINTESTINAL: No abdominal or epigastric pain. No nausea, vomiting, or hematemesis; No diarrhea or constipation. No melena or hematochezia.  GENITOURINARY: No dysuria, frequency, hematuria, or incontinence  NEUROLOGICAL: No headaches, memory loss, loss of strength, numbness, or tremors  SKIN: No itching, burning, rashes, or lesions   LYMPH Nodes: No enlarged glands  ENDOCRINE: No heat or cold intolerance; No hair loss  MUSCULOSKELETAL: No joint pain or swelling; No muscle, back, or extremity pain  PSYCHIATRIC: No depression, anxiety, mood swings, or difficulty sleeping  HEME/LYMPH: No easy bruising, or bleeding gums  ALLERGY AND IMMUNOLOGIC: No hives or eczema	    [ ] All others negative	  [ ] Unable to obtain    PHYSICAL EXAM:  T(C): 36.3 (07-06-22 @ 04:54), Max: 36.9 (07-05-22 @ 20:22)  HR: 70 (07-06-22 @ 04:54) (63 - 71)  BP: 108/65 (07-06-22 @ 04:54) (108/65 - 125/82)  RR: 18 (07-06-22 @ 04:54) (18 - 18)  SpO2: 94% (07-06-22 @ 04:54) (91% - 96%)  Wt(kg): --  I&O's Summary    04 Jul 2022 07:01  -  05 Jul 2022 07:00  --------------------------------------------------------  IN: 900 mL / OUT: 2500 mL / NET: -1600 mL    05 Jul 2022 07:01  -  06 Jul 2022 06:54  --------------------------------------------------------  IN: 600 mL / OUT: 4950 mL / NET: -4350 mL        Appearance: Normal	  HEENT:   Normal oral mucosa, PERRL, EOMI	  Lymphatic: No lymphadenopathy  Cardiovascular: Normal S1 S2, No JVD, + murmurs, No edema  Respiratory: Lungs clear to auscultation	  Psychiatry: A & O x 3, Mood & affect appropriate  Gastrointestinal:  Soft, Non-tender, + BS	  Skin: No rashes, No ecchymoses, No cyanosis	  Neurologic: Non-focal  Extremities: Normal range of motion, No clubbing, cyanosis or edema  Vascular: Peripheral pulses palpable 2+ bilaterally    MEDICATIONS  (STANDING):  apixaban 5 milliGRAM(s) Oral every 12 hours  aspirin enteric coated 81 milliGRAM(s) Oral daily  escitalopram 5 milliGRAM(s) Oral daily  levothyroxine 137 MICROGram(s) Oral daily  metoprolol succinate  milliGRAM(s) Oral daily  spironolactone 25 milliGRAM(s) Oral daily  torsemide 20 milliGRAM(s) Oral daily      TELEMETRY: 	    ECG:  	  RADIOLOGY:  OTHER: 	  	  LABS:	 	    CARDIAC MARKERS:            07-05    138  |  98  |  38<H>  ----------------------------<  90  4.0   |  32<H>  |  1.43<H>    Ca    9.2      05 Jul 2022 06:53      proBNP: Serum Pro-Brain Natriuretic Peptide: 1983 pg/mL (06-28 @ 17:42)    Lipid Profile:   HgA1c:   TSH:     < from: TTE with Doppler (w/Cont) (07.05.22 @ 09:42) >  Endocardial visualization enhanced with intravenous  injection of Ultrasonic Enhancing Agent (Definity).  Severe left ventricular enlargement.  Estimated ejection fraction 35%. Diffuse hypokinesis, with  inferior akinesis.  Bioprosthetic mitral valve with normal function.  Right ventricular enlargement with decreased right  ventricular systolic function.  A device wire is noted in the right heart.      Assessment and plan  ---------------------------  78 y/o male PMHx HTN, HLD, CAD s/p stent on ASA, A-fib, hypothyroid, CHF now presenting to the ED worsening SOB, DUNCAN, peripheral edema, scrotal edema and 20 pound unintentional weight gain over last month. Patient reported non compliance with diuretics as he had doctors appts to attend to and did not want to persistently have to void. Patient is experiencing difficulty voiding 2/2 scrotal edema. 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  oob to chair  increase ambulation  change torsemide to 20 mg daily  echo noted with worsening EF to 35 % change comparing to the old echo  will add ace if tolertaed  nuclear stress test, ?cath  check renal function    	        
           CARDIOLOGY     PROGRESS  NOTE   ________________________________________________    CHIEF COMPLAINT:Patient is a 77y old  Male who presents with a chief complaint of sob (06 Jul 2022 08:58)  doing better.  	  REVIEW OF SYSTEMS:  CONSTITUTIONAL: No fever, weight loss, or fatigue  EYES: No eye pain, visual disturbances, or discharge  ENT:  No difficulty hearing, tinnitus, vertigo; No sinus or throat pain  NECK: No pain or stiffness  RESPIRATORY: No cough, wheezing, chills or hemoptysis; No Shortness of Breath  CARDIOVASCULAR: No chest pain, palpitations, passing out, dizziness, or leg swelling  GASTROINTESTINAL: No abdominal or epigastric pain. No nausea, vomiting, or hematemesis; No diarrhea or constipation. No melena or hematochezia.  GENITOURINARY: No dysuria, frequency, hematuria, or incontinence  NEUROLOGICAL: No headaches, memory loss, loss of strength, numbness, or tremors  SKIN: No itching, burning, rashes, or lesions   LYMPH Nodes: No enlarged glands  ENDOCRINE: No heat or cold intolerance; No hair loss  MUSCULOSKELETAL: No joint pain or swelling; No muscle, back, or extremity pain  PSYCHIATRIC: No depression, anxiety, mood swings, or difficulty sleeping  HEME/LYMPH: No easy bruising, or bleeding gums  ALLERGY AND IMMUNOLOGIC: No hives or eczema	    [ ] All others negative	  [ ] Unable to obtain    PHYSICAL EXAM:  T(C): 36.9 (07-07-22 @ 04:58), Max: 36.9 (07-07-22 @ 04:58)  HR: 64 (07-07-22 @ 04:58) (59 - 68)  BP: 106/63 (07-07-22 @ 04:58) (98/58 - 111/59)  RR: 18 (07-07-22 @ 04:58) (18 - 18)  SpO2: 96% (07-07-22 @ 04:58) (93% - 99%)  Wt(kg): --  I&O's Summary    06 Jul 2022 07:01  -  07 Jul 2022 07:00  --------------------------------------------------------  IN: 720 mL / OUT: 1200 mL / NET: -480 mL        Appearance: Normal	  HEENT:   Normal oral mucosa, PERRL, EOMI	  Lymphatic: No lymphadenopathy  Cardiovascular: Normal S1 S2, No JVD, + murmurs, No edema  Respiratory: Lungs clear to auscultation	  Psychiatry: A & O x 3, Mood & affect appropriate  Gastrointestinal:  Soft, Non-tender, + BS	  Skin: No rashes, No ecchymoses, No cyanosis	  Neurologic: Non-focal  Extremities: Normal range of motion, No clubbing, cyanosis or edema  Vascular: Peripheral pulses palpable 2+ bilaterally    MEDICATIONS  (STANDING):  aspirin enteric coated 81 milliGRAM(s) Oral daily  escitalopram 5 milliGRAM(s) Oral daily  heparin  Infusion.  Unit(s)/Hr (20 mL/Hr) IV Continuous <Continuous>  levothyroxine 137 MICROGram(s) Oral daily  metoprolol succinate  milliGRAM(s) Oral daily  spironolactone 25 milliGRAM(s) Oral daily  torsemide 20 milliGRAM(s) Oral daily      TELEMETRY: 	    ECG:  	  RADIOLOGY:  OTHER: 	  	  LABS:	 	    CARDIAC MARKERS:                                11.6   4.62  )-----------( 159      ( 06 Jul 2022 17:00 )             35.3     07-06    140  |  98  |  46<H>  ----------------------------<  98  4.0   |  32<H>  |  1.36<H>    Ca    9.3      06 Jul 2022 07:05      proBNP: Serum Pro-Brain Natriuretic Peptide: 1983 pg/mL (06-28 @ 17:42)    Lipid Profile:   HgA1c:   TSH:   PTT - ( 07 Jul 2022 01:25 )  PTT:165.2 sec    < from: TTE with Doppler (w/Cont) (07.05.22 @ 09:42) >  Endocardial visualization enhanced with intravenous  injection of Ultrasonic Enhancing Agent (Definity).  Severe left ventricular enlargement.  Estimated ejection fraction 35%. Diffuse hypokinesis, with  inferior akinesis.  Bioprosthetic mitral valve with normal function.  Right ventricular enlargement with decreased right  ventricular systolic function.  A device wire is noted in the right heart.    < from: Nuclear Stress Test-Pharmacologic (Nuclear Stress Test-Pharmacologic .) (07.05.20 @ 12:31) >  * Symptom: Shortness of breath and stomach discomfort  resolved spontaneously.  * HR Response: Appropriate.  * BP Response: Appropriate.  * Heart Rhythm: Atrial Paced Rhythm - 60 BPM with  prolonged NE interval.  * Baseline ECG: Nonspecific ST-T wave abnormality.  * T wave abnormality in III, inverted.  * ECG Abnormalities: No significant changes from baseline  ECG.  * Arrhythmia: Occasional APDs occurred during stress.  Occasional VPDs occurred during stress and recovery.  * Review of raw data shows: Diaphragmatic artifact., Minor  motion artifact.  * The left ventricle was enlarged. There are mild defects  in basal to mid anterior walls that are reversible,  suggestive of ischemia.  * There are large, moderate defects in apical, basal  inferolateral, basal septal, inferior walls that are  fixed, with normal wall motion suggestive of diaphragmatic  attenuation artifact.  * Patient could not perform prone imaging.  * Post-stress gated wall motion analysis was performed  (LVEF = 55 %;LVEDV = 216 ml.) revealing left ventricular  enlargement with normal left ventricular systolic  function.      Assessment and plan  ---------------------------  78 y/o male PMHx HTN, HLD, CAD s/p stent on ASA, A-fib, hypothyroid, CHF now presenting to the ED worsening SOB, DUNCAN, peripheral edema, scrotal edema and 20 pound unintentional weight gain over last month. Patient reported non compliance with diuretics as he had doctors appts to attend to and did not want to persistently have to void. Patient is experiencing difficulty voiding 2/2 scrotal edema. 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  oob to chair  increase ambulation  change torsemide to 20 mg daily  echo noted with worsening EF to 35 % change comparing to the old echo  will add ace if tolertaed  nuclear stress test, ?cath  check renal function  there is sig discrepancy between echo and stress test with worsening chf plan for cath today      	        
           CARDIOLOGY     PROGRESS  NOTE   ________________________________________________    CHIEF COMPLAINT:Patient is a 77y old  Male who presents with a chief complaint of sob (29 Jun 2022 09:18)  no complain.  	  REVIEW OF SYSTEMS:  CONSTITUTIONAL: No fever, weight loss, or fatigue  EYES: No eye pain, visual disturbances, or discharge  ENT:  No difficulty hearing, tinnitus, vertigo; No sinus or throat pain  NECK: No pain or stiffness  RESPIRATORY: No cough, wheezing, chills or hemoptysis; No Shortness of Breath  CARDIOVASCULAR: No chest pain, palpitations, passing out, dizziness, or leg swelling  GASTROINTESTINAL: No abdominal or epigastric pain. No nausea, vomiting, or hematemesis; No diarrhea or constipation. No melena or hematochezia.  GENITOURINARY: No dysuria, frequency, hematuria, or incontinence  NEUROLOGICAL: No headaches, memory loss, loss of strength, numbness, or tremors  SKIN: No itching, burning, rashes, or lesions   LYMPH Nodes: No enlarged glands  ENDOCRINE: No heat or cold intolerance; No hair loss  MUSCULOSKELETAL: No joint pain or swelling; No muscle, back, or extremity pain  PSYCHIATRIC: No depression, anxiety, mood swings, or difficulty sleeping  HEME/LYMPH: No easy bruising, or bleeding gums  ALLERGY AND IMMUNOLOGIC: No hives or eczema	    [ ] All others negative	  [ ] Unable to obtain    PHYSICAL EXAM:  T(C): 36.6 (06-30-22 @ 04:10), Max: 36.7 (06-29-22 @ 20:03)  HR: 63 (06-30-22 @ 04:10) (63 - 70)  BP: 106/64 (06-30-22 @ 04:10) (106/64 - 113/67)  RR: 18 (06-30-22 @ 04:10) (18 - 18)  SpO2: 97% (06-30-22 @ 04:10) (96% - 97%)  Wt(kg): --  I&O's Summary    29 Jun 2022 07:01  -  30 Jun 2022 07:00  --------------------------------------------------------  IN: 100 mL / OUT: 1350 mL / NET: -1250 mL        Appearance: Normal	  HEENT:   Normal oral mucosa, PERRL, EOMI	  Lymphatic: No lymphadenopathy  Cardiovascular: Normal S1 S2, No JVD, +murmurs, + edema  Respiratory: rhonci  Psychiatry: A & O x 3, Mood & affect appropriate  Gastrointestinal:  Soft, Non-tender, + BS	  Skin: No rashes, No ecchymoses, No cyanosis	  Neurologic: Non-focal  Extremities: Normal range of motion, No clubbing, cyanosis , + edema  Vascular: Peripheral pulses palpable 2+ bilaterally    MEDICATIONS  (STANDING):  apixaban 5 milliGRAM(s) Oral every 12 hours  aspirin enteric coated 81 milliGRAM(s) Oral daily  buMETAnide Injectable 1 milliGRAM(s) IV Push two times a day  cephalexin 500 milliGRAM(s) Oral three times a day  escitalopram 5 milliGRAM(s) Oral daily  levothyroxine 137 MICROGram(s) Oral daily  metoprolol succinate  milliGRAM(s) Oral daily  spironolactone 25 milliGRAM(s) Oral daily      TELEMETRY: 	    ECG:  	  RADIOLOGY:  OTHER: 	  	  LABS:	 	    CARDIAC MARKERS:                                9.7    5.03  )-----------( 142      ( 29 Jun 2022 06:41 )             31.1     06-29    140  |  106  |  17  ----------------------------<  106<H>  3.8   |  26  |  1.02    Ca    8.6      29 Jun 2022 06:42    TPro  6.7  /  Alb  3.7  /  TBili  0.9  /  DBili  x   /  AST  23  /  ALT  12  /  AlkPhos  118  06-28    proBNP: Serum Pro-Brain Natriuretic Peptide: 1983 pg/mL (06-28 @ 17:42)    Lipid Profile:   HgA1c:   TSH:   PT/INR - ( 28 Jun 2022 17:42 )   PT: 15.6 sec;   INR: 1.35 ratio         PTT - ( 28 Jun 2022 17:42 )  PTT:32.2 sec    < from: Transesophageal Echocardiogram w/o TTE (08.23.21 @ 20:15) >  1. 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 Severe eccentric mainly  anterior mitral regurgitation. Flow reversal is seen in the  pulmonary veins.  2. Calcified trileaflet aortic valve with normal opening.  Mild-moderate aortic regurgitation.  3. Normal left ventricular systolic function.  4. A device wire is noted in the right heart.  5. Normal right ventricular size and function.    < from: Limited Transthoracic Echo (w/Cont) (09.14.21 @ 16:55) >  1. Patient in atrial fibrillation; ejection fracion varies  with R-R interval. Endocardial visualization enhanced with  intravenous injection of Ultrasonic Enhancing Agent  (Definity). Grossly normal left ventricular systolic  function. Septal motion consistent with prior cardiac  surgery.  2. Right ventricle not well visualized; right ventricle  appears enlarged with decreased right ventricular systolic  function.  A device wire is noted in the right heart.  3. Thickened pericardium. Small, organized pericardial  effusion predominantly seen anterior to the right  ventricle. The effusion measures up to approximately 1.0 cm  anterior to the right ventricle. Subcostal images are very  limited. No obvious echocardiographic evidence of  pericardial tamponade.        Assessment and plan  ---------------------------  76 y/o male PMHx HTN, HLD, CAD s/p stent on ASA, A-fib, hypothyroid, CHF now presenting to the ED worsening SOB, DUNCAN, peripheral edema, scrotal edema and 20 pound unintentional weight gain over last month. Patient reported non compliance with diuretics as he had doctors appts to attend to and did not want to persistently have to void. Patient is experiencing difficulty voiding 2/2 scrotal edema. Denied CP, cough, fever, N/V/D,  pt with sig cardiac hx with sob/ edema  chf ?systolic acute on chronic, MR  will adjust cardiac meds  iv lasix  repeat echo to check for MR  beta blocker/ ace/bumex /aldactone  continue ac sec to a.fib  strict i/o  check lytes  pvd on Pletal, normal LVF, pletal is contraindicated in pt with CHF.      	        
  afebrile    REVIEW OF SYSTEMS:  GEN: no fever,    no chills  RESP: no SOB,   no cough  CVS: no chest pain,   no palpitations  GI: no abdominal pain,   no nausea,   no vomiting,   no constipation,   no diarrhea  : no dysuria,   no frequency  NEURO: no headache,   no dizziness  PSYCH: no depression,   not anxious  Derm : no rash    MEDICATIONS  (STANDING):  apixaban 5 milliGRAM(s) Oral every 12 hours  aspirin enteric coated 81 milliGRAM(s) Oral daily  buMETAnide Injectable 1 milliGRAM(s) IV Push two times a day  cephalexin 500 milliGRAM(s) Oral three times a day  escitalopram 5 milliGRAM(s) Oral daily  levothyroxine 137 MICROGram(s) Oral daily  metoprolol succinate  milliGRAM(s) Oral daily  spironolactone 25 milliGRAM(s) Oral daily    MEDICATIONS  (PRN):      Vital Signs Last 24 Hrs  T(C): 36.4 (02 Jul 2022 05:18), Max: 36.8 (01 Jul 2022 20:53)  T(F): 97.5 (02 Jul 2022 05:18), Max: 98.3 (01 Jul 2022 20:53)  HR: 65 (02 Jul 2022 05:18) (65 - 72)  BP: 131/81 (02 Jul 2022 05:18) (127/77 - 135/79)  BP(mean): --  RR: 18 (02 Jul 2022 05:18) (17 - 18)  SpO2: 98% (02 Jul 2022 05:18) (94% - 98%)  CAPILLARY BLOOD GLUCOSE        I&O's Summary    01 Jul 2022 07:01  -  02 Jul 2022 07:00  --------------------------------------------------------  IN: 250 mL / OUT: 3500 mL / NET: -3250 mL    02 Jul 2022 07:01  -  02 Jul 2022 11:03  --------------------------------------------------------  IN: 0 mL / OUT: 700 mL / NET: -700 mL        PHYSICAL EXAM:  HEAD:  Atraumatic, Normocephalic  NECK: Supple, No   JVD  CHEST/LUNG:   no     rales,     no,    rhonchi  HEART: Regular rate and rhythm;         murmur  ABDOMEN: Soft, Nontender, ;   EXTREMITIES:   less     edema  NEUROLOGY:  alert    LABS:                        10.6   5.58  )-----------( 169      ( 01 Jul 2022 06:41 )             33.9     07-02    144  |  100  |  25<H>  ----------------------------<  95  4.0   |  31  |  1.22    Ca    9.0      02 Jul 2022 06:09  Phos  2.4     07-01  Mg     1.8     07-01                              Consultant(s) Notes Reviewed:      Care Discussed with Consultants/Other Providers:    
  afebrile    REVIEW OF SYSTEMS:  GEN: no fever,    no chills  RESP: no SOB,   no cough  CVS: no chest pain,   no palpitations  GI: no abdominal pain,   no nausea,   no vomiting,   no constipation,   no diarrhea  : no dysuria,   no frequency  NEURO: no headache,   no dizziness  PSYCH: no depression,   not anxious  Derm : no rash    MEDICATIONS  (STANDING):  apixaban 5 milliGRAM(s) Oral every 12 hours  aspirin enteric coated 81 milliGRAM(s) Oral daily  escitalopram 5 milliGRAM(s) Oral daily  levothyroxine 137 MICROGram(s) Oral daily  metoprolol succinate  milliGRAM(s) Oral daily  spironolactone 25 milliGRAM(s) Oral daily  torsemide 20 milliGRAM(s) Oral daily    MEDICATIONS  (PRN):      Vital Signs Last 24 Hrs  T(C): 36.3 (08 Jul 2022 04:49), Max: 36.8 (07 Jul 2022 21:27)  T(F): 97.3 (08 Jul 2022 04:49), Max: 98.2 (07 Jul 2022 21:27)  HR: 65 (08 Jul 2022 04:49) (59 - 76)  BP: 113/81 (08 Jul 2022 04:49) (93/52 - 120/72)  BP(mean): --  RR: 18 (08 Jul 2022 04:49) (15 - 18)  SpO2: 96% (08 Jul 2022 04:49) (92% - 97%)    Parameters below as of 08 Jul 2022 04:49  Patient On (Oxygen Delivery Method): room air      CAPILLARY BLOOD GLUCOSE        I&O's Summary    07 Jul 2022 07:01  -  08 Jul 2022 07:00  --------------------------------------------------------  IN: 63 mL / OUT: 1600 mL / NET: -1537 mL    08 Jul 2022 07:01  -  08 Jul 2022 09:22  --------------------------------------------------------  IN: 0 mL / OUT: 575 mL / NET: -575 mL        PHYSICAL EXAM:  HEAD:  Atraumatic, Normocephalic  NECK: Supple, No   JVD  CHEST/LUNG:   no     rales,     no,    rhonchi  HEART: Regular rate and rhythm;         murmur  ABDOMEN: Soft, Nontender, ;   EXTREMITIES:     no   edema  NEUROLOGY:  alert    LABS:                        12.1   4.83  )-----------( 158      ( 08 Jul 2022 06:35 )             37.2     07-08    140  |  100  |  55<H>  ----------------------------<  103<H>  4.2   |  29  |  1.52<H>    Ca    9.8      08 Jul 2022 06:33      PTT - ( 08 Jul 2022 06:36 )  PTT:46.3 sec                        Consultant(s) Notes Reviewed:      Care Discussed with Consultants/Other Providers:    
           CARDIOLOGY     PROGRESS  NOTE   ________________________________________________    CHIEF COMPLAINT:Patient is a 77y old  Male who presents with a chief complaint of sob (07 Jul 2022 08:48)  no complain  	  REVIEW OF SYSTEMS:  CONSTITUTIONAL: No fever, weight loss, or fatigue  EYES: No eye pain, visual disturbances, or discharge  ENT:  No difficulty hearing, tinnitus, vertigo; No sinus or throat pain  NECK: No pain or stiffness  RESPIRATORY: No cough, wheezing, chills or hemoptysis; No Shortness of Breath  CARDIOVASCULAR: No chest pain, palpitations, passing out, dizziness, or leg swelling  GASTROINTESTINAL: No abdominal or epigastric pain. No nausea, vomiting, or hematemesis; No diarrhea or constipation. No melena or hematochezia.  GENITOURINARY: No dysuria, frequency, hematuria, or incontinence  NEUROLOGICAL: No headaches, memory loss, loss of strength, numbness, or tremors  SKIN: No itching, burning, rashes, or lesions   LYMPH Nodes: No enlarged glands  ENDOCRINE: No heat or cold intolerance; No hair loss  MUSCULOSKELETAL: No joint pain or swelling; No muscle, back, or extremity pain  PSYCHIATRIC: No depression, anxiety, mood swings, or difficulty sleeping  HEME/LYMPH: No easy bruising, or bleeding gums  ALLERGY AND IMMUNOLOGIC: No hives or eczema	    [ ] All others negative	  [ ] Unable to obtain    PHYSICAL EXAM:  T(C): 36.3 (07-08-22 @ 04:49), Max: 36.8 (07-07-22 @ 21:27)  HR: 65 (07-08-22 @ 04:49) (59 - 76)  BP: 113/81 (07-08-22 @ 04:49) (93/52 - 120/72)  RR: 18 (07-08-22 @ 04:49) (15 - 18)  SpO2: 96% (07-08-22 @ 04:49) (92% - 97%)  Wt(kg): --  I&O's Summary    07 Jul 2022 07:01  -  08 Jul 2022 07:00  --------------------------------------------------------  IN: 63 mL / OUT: 1600 mL / NET: -1537 mL        Appearance: Normal	  HEENT:   Normal oral mucosa, PERRL, EOMI	  Lymphatic: No lymphadenopathy  Cardiovascular: Normal S1 S2, No JVD, + murmurs, No edema  Respiratory: Lungs clear to auscultation	  Psychiatry: A & O x 3, Mood & affect appropriate  Gastrointestinal:  Soft, Non-tender, + BS	  Skin: No rashes, No ecchymoses, No cyanosis	  Neurologic: Non-focal  Extremities: Normal range of motion, No clubbing, cyanosis or edema  Vascular: Peripheral pulses palpable 2+ bilaterally    MEDICATIONS  (STANDING):  aspirin enteric coated 81 milliGRAM(s) Oral daily  escitalopram 5 milliGRAM(s) Oral daily  heparin  Infusion. 900 Unit(s)/Hr (9 mL/Hr) IV Continuous <Continuous>  levothyroxine 137 MICROGram(s) Oral daily  metoprolol succinate  milliGRAM(s) Oral daily  spironolactone 25 milliGRAM(s) Oral daily  torsemide 20 milliGRAM(s) Oral daily      TELEMETRY: 	    ECG:  	  RADIOLOGY:  OTHER: 	  	  LABS:	 	    CARDIAC MARKERS:                                12.1   4.83  )-----------( 158      ( 08 Jul 2022 06:35 )             37.2     07-07    141  |  98  |  50<H>  ----------------------------<  126<H>  4.4   |  30  |  1.31<H>    Ca    9.7      07 Jul 2022 09:10      proBNP: Serum Pro-Brain Natriuretic Peptide: 1983 pg/mL (06-28 @ 17:42)    Lipid Profile:   HgA1c:   TSH:   PTT - ( 07 Jul 2022 09:10 )  PTT:101.0 sec  < from: Cardiac Catheterization (07.07.22 @ 16:53) >    Coronary Angiography   The coronary circulation is right dominant.      LM   Left main artery: Angiography shows mild atherosclerosis.      LAD   Mid left anterior descending: There is a 50 % stenosis. First  diagonal: There is a 60 % stenosis. Second diagonal: There is a  90 % stenosis.      CX   Proximal circumflex: There is a 95 % stenosis.      RCA   Right coronary artery: Angiography shows mild atherosclerosis.      Graft Angiography     LIMA graft to Second diagonal: Angiography shows no disease.    SVG graft to First obtuse marginal: Angiography shows no disease.          Assessment and plan  ---------------------------  78 y/o male PMHx HTN, HLD, CAD s/p stent on ASA, A-fib, hypothyroid, CHF now presenting to the ED worsening SOB, DUNCAN, peripheral edema, scrotal edema and 20 pound unintentional weight gain over last month. Patient reported non compliance with diuretics as he had doctors appts to attend to and did not want to persistently have to void. Patient is experiencing difficulty voiding 2/2 scrotal edema. 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  oob to chair  increase ambulation  change torsemide to 20 mg daily  echo noted with worsening EF to 35 % change comparing to the old echo  will add ace if tolertaed  nuclear stress test, ?cath  check renal function  there is sig discrepancy between echo and stress test with worsening chf , cath noted  re start on AC  dc planning    	        
           CARDIOLOGY     PROGRESS  NOTE   ________________________________________________    CHIEF COMPLAINT:Patient is a 77y old  Male who presents with a chief complaint of sob (28 Jun 2022 19:14)  doing better.  	  REVIEW OF SYSTEMS:  CONSTITUTIONAL: No fever, weight loss, or fatigue  EYES: No eye pain, visual disturbances, or discharge  ENT:  No difficulty hearing, tinnitus, vertigo; No sinus or throat pain  NECK: No pain or stiffness  RESPIRATORY: No cough, wheezing, chills or hemoptysis; + mild Shortness of Breath  CARDIOVASCULAR: No chest pain, palpitations, passing out, dizziness, or leg swelling  GASTROINTESTINAL: No abdominal or epigastric pain. No nausea, vomiting, or hematemesis; No diarrhea or constipation. No melena or hematochezia.  GENITOURINARY: No dysuria, frequency, hematuria, or incontinence  NEUROLOGICAL: No headaches, memory loss, loss of strength, numbness, or tremors  SKIN: No itching, burning, rashes, or lesions   LYMPH Nodes: No enlarged glands  ENDOCRINE: No heat or cold intolerance; No hair loss  MUSCULOSKELETAL: No joint pain or swelling; No muscle, back, or extremity pain  PSYCHIATRIC: No depression, anxiety, mood swings, or difficulty sleeping  HEME/LYMPH: No easy bruising, or bleeding gums  ALLERGY AND IMMUNOLOGIC: No hives or eczema	    [ ] All others negative	  [ ] Unable to obtain    PHYSICAL EXAM:  T(C): 37 (06-29-22 @ 04:42), Max: 37 (06-29-22 @ 04:42)  HR: 70 (06-29-22 @ 04:42) (70 - 102)  BP: 120/79 (06-29-22 @ 04:42) (120/79 - 142/85)  RR: 18 (06-29-22 @ 04:42) (16 - 19)  SpO2: 100% (06-29-22 @ 04:42) (94% - 100%)  Wt(kg): --  I&O's Summary    28 Jun 2022 07:01  -  29 Jun 2022 07:00  --------------------------------------------------------  IN: 0 mL / OUT: 400 mL / NET: -400 mL        Appearance: Normal	  HEENT:   Normal oral mucosa, PERRL, EOMI	  Lymphatic: No lymphadenopathy  Cardiovascular: Normal S1 S2, No JVD, No murmurs, + edema  Respiratory: Lungs clear to auscultation	  Psychiatry: A & O x 3, Mood & affect appropriate  Gastrointestinal:  Soft, Non-tender, + BS	  Skin: No rashes, No ecchymoses, No cyanosis	  Neurologic: Non-focal  Extremities: Normal range of motion, No clubbing, cyanosis , + edema  Vascular: Peripheral pulses palpable 2+ bilaterally    MEDICATIONS  (STANDING):  apixaban 5 milliGRAM(s) Oral every 12 hours  aspirin enteric coated 81 milliGRAM(s) Oral daily  buMETAnide Injectable 1 milliGRAM(s) IV Push two times a day  cephalexin 500 milliGRAM(s) Oral three times a day  cilostazol 100 milliGRAM(s) Oral two times a day  escitalopram 5 milliGRAM(s) Oral daily  levothyroxine 137 MICROGram(s) Oral daily  metoprolol succinate  milliGRAM(s) Oral daily  spironolactone 25 milliGRAM(s) Oral daily      TELEMETRY: 	    ECG:  	  RADIOLOGY:  OTHER: 	  	  LABS:	 	    CARDIAC MARKERS:                                9.7    5.03  )-----------( 142      ( 29 Jun 2022 06:41 )             31.1     06-28    140  |  105  |  19  ----------------------------<  104<H>  3.9   |  26  |  1.07    Ca    9.0      28 Jun 2022 17:42    TPro  6.7  /  Alb  3.7  /  TBili  0.9  /  DBili  x   /  AST  23  /  ALT  12  /  AlkPhos  118  06-28    proBNP: Serum Pro-Brain Natriuretic Peptide: 1983 pg/mL (06-28 @ 17:42)    Lipid Profile:   HgA1c:   TSH:   PT/INR - ( 28 Jun 2022 17:42 )   PT: 15.6 sec;   INR: 1.35 ratio         PTT - ( 28 Jun 2022 17:42 )  PTT:32.2 sec  < from: Xray Chest 1 View AP/PA (06.28.22 @ 17:40) >  Pulmonary vascular congestion and left pleural effusion.    < end of copied text >      Assessment and plan  ---------------------------  78 y/o male PMHx HTN, HLD, CAD s/p stent on ASA, A-fib, hypothyroid, CHF now presenting to the ED worsening SOB, DUNCAN, peripheral edema, scrotal edema and 20 pound unintentional weight gain over last month. Patient reported non compliance with diuretics as he had doctors appts to attend to and did not want to persistently have to void. Patient is experiencing difficulty voiding 2/2 scrotal edema. Denied CP, cough, fever, N/V/D,  pt with sig cardiac hx with sob/ edema  chf ?systolic acute on chronic  will adjust cardiac meds  iv lasix  repeat echo  beta blocker/ ace/bumex /aldactone  continue ac sec to a.fib  strict i/o  check lytes  pvd on Pletal, normal LVF, pletakl is contraindicated in pt with CHF.    	        
    afberile  REVIEW OF SYSTEMS:  GEN: no fever,    no chills  RESP: no SOB,   no cough  CVS: no chest pain,   no palpitations  GI: no abdominal pain,   no nausea,   no vomiting,   no constipation,   no diarrhea  : no dysuria,   no frequency  NEURO: no headache,   no dizziness  PSYCH: no depression,   not anxious  Derm : no rash    MEDICATIONS  (STANDING):  apixaban 5 milliGRAM(s) Oral every 12 hours  aspirin enteric coated 81 milliGRAM(s) Oral daily  buMETAnide 2 milliGRAM(s) Oral daily  escitalopram 5 milliGRAM(s) Oral daily  levothyroxine 137 MICROGram(s) Oral daily  metoprolol succinate  milliGRAM(s) Oral daily  spironolactone 25 milliGRAM(s) Oral daily    MEDICATIONS  (PRN):      Vital Signs Last 24 Hrs  T(C): 36.6 (04 Jul 2022 05:19), Max: 36.6 (04 Jul 2022 05:19)  T(F): 97.9 (04 Jul 2022 05:19), Max: 97.9 (04 Jul 2022 05:19)  HR: 72 (04 Jul 2022 05:19) (65 - 72)  BP: 126/67 (04 Jul 2022 05:19) (110/64 - 126/67)  BP(mean): --  RR: 18 (04 Jul 2022 05:19) (18 - 18)  SpO2: 97% (04 Jul 2022 05:19) (96% - 97%)  CAPILLARY BLOOD GLUCOSE        I&O's Summary    03 Jul 2022 07:01  -  04 Jul 2022 07:00  --------------------------------------------------------  IN: 0 mL / OUT: 3601 mL / NET: -3601 mL    04 Jul 2022 07:01  -  04 Jul 2022 10:20  --------------------------------------------------------  IN: 460 mL / OUT: 850 mL / NET: -390 mL        PHYSICAL EXAM:  HEAD:  Atraumatic, Normocephalic  NECK: Supple, No   JVD  CHEST/LUNG:   no     rales,     no,    rhonchi  HEART: Regular rate and rhythm;         murmur  ABDOMEN: Soft, Nontender, ;   EXTREMITIES:     less   edema  NEUROLOGY:  alert    LABS:    07-04    140  |  98  |  34<H>  ----------------------------<  89  3.8   |  34<H>  |  1.35<H>    Ca    9.1      04 Jul 2022 06:54  Phos  3.4     07-04  Mg     2.0     07-04                              Consultant(s) Notes Reviewed:      Care Discussed with Consultants/Other Providers:    
  afbeirle    REVIEW OF SYSTEMS:  GEN: no fever,    no chills  RESP: no SOB,   no cough  CVS: no chest pain,   no palpitations  GI: no abdominal pain,   no nausea,   no vomiting,   no constipation,   no diarrhea  : no dysuria,   no frequency  NEURO: no headache,   no dizziness  PSYCH: no depression,   not anxious  Derm : no rash    MEDICATIONS  (STANDING):  apixaban 5 milliGRAM(s) Oral every 12 hours  aspirin enteric coated 81 milliGRAM(s) Oral daily  buMETAnide Injectable 1 milliGRAM(s) IV Push two times a day  cephalexin 500 milliGRAM(s) Oral three times a day  escitalopram 5 milliGRAM(s) Oral daily  levothyroxine 137 MICROGram(s) Oral daily  metoprolol succinate  milliGRAM(s) Oral daily  spironolactone 25 milliGRAM(s) Oral daily    MEDICATIONS  (PRN):      Vital Signs Last 24 Hrs  T(C): 37 (2022 04:42), Max: 37 (2022 04:42)  T(F): 98.6 (2022 04:42), Max: 98.6 (2022 04:42)  HR: 70 (2022 04:42) (70 - 102)  BP: 120/79 (2022 04:42) (120/79 - 142/85)  BP(mean): --  RR: 18 (2022 04:42) (16 - 19)  SpO2: 100% (2022 04:42) (94% - 100%)  CAPILLARY BLOOD GLUCOSE        I&O's Summary    2022 07:01  -  2022 07:00  --------------------------------------------------------  IN: 0 mL / OUT: 400 mL / NET: -400 mL        PHYSICAL EXAM:  HEAD:  Atraumatic, Normocephalic  NECK: Supple, No   JVD  CHEST/LUNG:   no     rales,     no,    rhonchi  HEART: Regular rate and rhythm;         murmur  ABDOMEN: Soft, Nontender, ;   EXTREMITIES:    4 +    edema  NEUROLOGY:  alert    LABS:                        9.7    5.03  )-----------( 142      ( 2022 06:41 )             31.1         140  |  106  |  17  ----------------------------<  106<H>  3.8   |  26  |  1.02    Ca    8.6      2022 06:42    TPro  6.7  /  Alb  3.7  /  TBili  0.9  /  DBili  x   /  AST  23  /  ALT  12  /  AlkPhos  118  06-28    PT/INR - ( 2022 17:42 )   PT: 15.6 sec;   INR: 1.35 ratio         PTT - ( 2022 17:42 )  PTT:32.2 sec      Urinalysis Basic - ( 2022 18:52 )    Color: Yellow / Appearance: Clear / S.029 / pH: x  Gluc: x / Ketone: Negative  / Bili: Negative / Urobili: 2 mg/dL   Blood: x / Protein: 30 mg/dL / Nitrite: Negative   Leuk Esterase: Negative / RBC: 1 /hpf / WBC 1 /HPF   Sq Epi: x / Non Sq Epi: 0 /hpf / Bacteria: Negative                      Consultant(s) Notes Reviewed:      Care Discussed with Consultants/Other Providers:    
           CARDIOLOGY     PROGRESS  NOTE   ________________________________________________    CHIEF COMPLAINT:Patient is a 77y old  Male who presents with a chief complaint of "77yoM PMHx HTN, HLD, CAD s/p stent on ASA, A-fib, hypothyroid, CHF p/w worsening SOB, DUNCAN, peripheral edema, scrotal edema and 20 pound unintentional weight gain over last month. Patient reported non compliance with diuretics as he had doctors appts to attend to and did not want to persistently have to void. Patient is experiencing difficulty voiding 2/2 scrotal edema." Admitted for CHF exacerbation 2/2 medication noncompliance  no complain.    	  REVIEW OF SYSTEMS:  CONSTITUTIONAL: No fever, weight loss, or fatigue  EYES: No eye pain, visual disturbances, or discharge  ENT:  No difficulty hearing, tinnitus, vertigo; No sinus or throat pain  NECK: No pain or stiffness  RESPIRATORY: No cough, wheezing, chills or hemoptysis; + Shortness of Breath  CARDIOVASCULAR: No chest pain, palpitations, passing out, dizziness, +leg swelling  GASTROINTESTINAL: No abdominal or epigastric pain. No nausea, vomiting, or hematemesis; No diarrhea or constipation. No melena or hematochezia.  GENITOURINARY: No dysuria, frequency, hematuria, or incontinence  NEUROLOGICAL: No headaches, memory loss, loss of strength, numbness, or tremors  SKIN: No itching, burning, rashes, or lesions   LYMPH Nodes: No enlarged glands  ENDOCRINE: No heat or cold intolerance; No hair loss  MUSCULOSKELETAL: No joint pain or swelling; No muscle, back, or extremity pain  PSYCHIATRIC: No depression, anxiety, mood swings, or difficulty sleeping  HEME/LYMPH: No easy bruising, or bleeding gums  ALLERGY AND IMMUNOLOGIC: No hives or eczema	    [ ] All others negative	  [ ] Unable to obtain    PHYSICAL EXAM:  T(C): 36.4 (07-02-22 @ 05:18), Max: 36.8 (07-01-22 @ 20:53)  HR: 65 (07-02-22 @ 05:18) (65 - 72)  BP: 131/81 (07-02-22 @ 05:18) (127/77 - 135/79)  RR: 18 (07-02-22 @ 05:18) (17 - 18)  SpO2: 98% (07-02-22 @ 05:18) (94% - 98%)  Wt(kg): --  I&O's Summary    01 Jul 2022 07:01  -  02 Jul 2022 07:00  --------------------------------------------------------  IN: 250 mL / OUT: 3500 mL / NET: -3250 mL    02 Jul 2022 07:01  -  02 Jul 2022 09:02  --------------------------------------------------------  IN: 0 mL / OUT: 700 mL / NET: -700 mL        Appearance: Normal	  HEENT:   Normal oral mucosa, PERRL, EOMI	  Lymphatic: No lymphadenopathy  Cardiovascular: Normal S1 S2, No JVD, No murmurs, + edema  Respiratory: Lungs clear to auscultation	  Psychiatry: A & O x 3, Mood & affect appropriate  Gastrointestinal:  Soft, Non-tender, + BS	  Skin: No rashes, No ecchymoses, No cyanosis	  Neurologic: Non-focal  Extremities: Normal range of motion, No clubbing, cyanosis + edema  Vascular: Peripheral pulses palpable 2+ bilaterally    MEDICATIONS  (STANDING):  apixaban 5 milliGRAM(s) Oral every 12 hours  aspirin enteric coated 81 milliGRAM(s) Oral daily  buMETAnide Injectable 1 milliGRAM(s) IV Push two times a day  cephalexin 500 milliGRAM(s) Oral three times a day  escitalopram 5 milliGRAM(s) Oral daily  levothyroxine 137 MICROGram(s) Oral daily  metoprolol succinate  milliGRAM(s) Oral daily  spironolactone 25 milliGRAM(s) Oral daily      TELEMETRY: 	    ECG:  	  RADIOLOGY:  OTHER: 	  	  LABS:	 	    CARDIAC MARKERS:                                10.6   5.58  )-----------( 169      ( 01 Jul 2022 06:41 )             33.9     07-02    144  |  100  |  25<H>  ----------------------------<  95  4.0   |  31  |  1.22    Ca    9.0      02 Jul 2022 06:09  Phos  2.4     07-01  Mg     1.8     07-01      proBNP: Serum Pro-Brain Natriuretic Peptide: 1983 pg/mL (06-28 @ 17:42)    Lipid Profile:   HgA1c:   TSH:   < from: Limited Transthoracic Echo (w/Cont) (09.14.21 @ 16:55) >  1. Patient in atrial fibrillation; ejection fracion varies  with R-R interval. Endocardial visualization enhanced with  intravenous injection of Ultrasonic Enhancing Agent  (Definity). Grossly normal left ventricular systolic  function. Septal motion consistent with prior cardiac  surgery.  2. Right ventricle not well visualized; right ventricle  appears enlarged with decreased right ventricular systolic  function.  A device wire is noted in the right heart.  3. Thickened pericardium. Small, organized pericardial  effusion predominantly seen anterior to the right  ventricle. The effusion measures up to approximately 1.0 cm  anterior to the right ventricle. Subcostal images are very  limited. No obvious echocardiographic evidence of  pericardial tamponade.    < end of copied text >        Assessment and plan  ---------------------------  76 y/o male PMHx HTN, HLD, CAD s/p stent on ASA, A-fib, hypothyroid, CHF now presenting to the ED worsening SOB, DUNCAN, peripheral edema, scrotal edema and 20 pound unintentional weight gain over last month. Patient reported non compliance with diuretics as he had doctors appts to attend to and did not want to persistently have to void. Patient is experiencing difficulty voiding 2/2 scrotal edema. 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  oob to chair  increase ambulation    	        
           CARDIOLOGY     PROGRESS  NOTE   ________________________________________________    CHIEF COMPLAINT:Patient is a 77y old  Male who presents with a chief complaint of sob (04 Jul 2022 11:02)  doing much better.  	  REVIEW OF SYSTEMS:  CONSTITUTIONAL: No fever, weight loss, or fatigue  EYES: No eye pain, visual disturbances, or discharge  ENT:  No difficulty hearing, tinnitus, vertigo; No sinus or throat pain  NECK: No pain or stiffness  RESPIRATORY: No cough, wheezing, chills or hemoptysis; No Shortness of Breath  CARDIOVASCULAR: No chest pain, palpitations, passing out, dizziness, or leg swelling  GASTROINTESTINAL: No abdominal or epigastric pain. No nausea, vomiting, or hematemesis; No diarrhea or constipation. No melena or hematochezia.  GENITOURINARY: No dysuria, frequency, hematuria, or incontinence  NEUROLOGICAL: No headaches, memory loss, loss of strength, numbness, or tremors  SKIN: No itching, burning, rashes, or lesions   LYMPH Nodes: No enlarged glands  ENDOCRINE: No heat or cold intolerance; No hair loss  MUSCULOSKELETAL: No joint pain or swelling; No muscle, back, or extremity pain  PSYCHIATRIC: No depression, anxiety, mood swings, or difficulty sleeping  HEME/LYMPH: No easy bruising, or bleeding gums  ALLERGY AND IMMUNOLOGIC: No hives or eczema	    [ ] All others negative	  [ ] Unable to obtain    PHYSICAL EXAM:  T(C): 36.5 (07-05-22 @ 05:20), Max: 36.9 (07-04-22 @ 21:00)  HR: 70 (07-05-22 @ 05:20) (60 - 70)  BP: 110/63 (07-05-22 @ 05:20) (102/63 - 110/63)  RR: 18 (07-05-22 @ 05:20) (17 - 18)  SpO2: 94% (07-05-22 @ 05:20) (94% - 97%)  Wt(kg): --  I&O's Summary    03 Jul 2022 07:01  -  04 Jul 2022 07:00  --------------------------------------------------------  IN: 0 mL / OUT: 3601 mL / NET: -3601 mL    04 Jul 2022 07:01  -  05 Jul 2022 06:59  --------------------------------------------------------  IN: 900 mL / OUT: 2500 mL / NET: -1600 mL        Appearance: Normal	  HEENT:   Normal oral mucosa, PERRL, EOMI	  Lymphatic: No lymphadenopathy  Cardiovascular: Normal S1 S2, No JVD, + murmurs, No edema  Respiratory: Lungs clear to auscultation	  Psychiatry: A & O x 3, Mood & affect appropriate  Gastrointestinal:  Soft, Non-tender, + BS	  Skin: No rashes, No ecchymoses, No cyanosis	  Neurologic: Non-focal  Extremities: Normal range of motion, No clubbing, cyanosis or edema  Vascular: Peripheral pulses palpable 2+ bilaterally    MEDICATIONS  (STANDING):  apixaban 5 milliGRAM(s) Oral every 12 hours  aspirin enteric coated 81 milliGRAM(s) Oral daily  escitalopram 5 milliGRAM(s) Oral daily  levothyroxine 137 MICROGram(s) Oral daily  metoprolol succinate  milliGRAM(s) Oral daily  spironolactone 25 milliGRAM(s) Oral daily  torsemide 10 milliGRAM(s) Oral daily      TELEMETRY: 	    ECG:  	  RADIOLOGY:  OTHER: 	  	  LABS:	 	    CARDIAC MARKERS:            07-04    140  |  98  |  34<H>  ----------------------------<  89  3.8   |  34<H>  |  1.35<H>    Ca    9.1      04 Jul 2022 06:54  Phos  3.4     07-04  Mg     2.0     07-04      proBNP: Serum Pro-Brain Natriuretic Peptide: 1983 pg/mL (06-28 @ 17:42)    Lipid Profile:   HgA1c:   TSH:         Assessment and plan  ---------------------------  78 y/o male PMHx HTN, HLD, CAD s/p stent on ASA, A-fib, hypothyroid, CHF now presenting to the ED worsening SOB, DUNCAN, peripheral edema, scrotal edema and 20 pound unintentional weight gain over last month. Patient reported non compliance with diuretics as he had doctors appts to attend to and did not want to persistently have to void. Patient is experiencing difficulty voiding 2/2 scrotal edema. 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  oob to chair  increase ambulation  change torsemide to 20 mg daily      	        
           CARDIOLOGY     PROGRESS  NOTE   ________________________________________________    CHIEF COMPLAINT:Patient is a 77y old  Male who presents with a chief complaint of sob (30 Jun 2022 09:16)  doing better.  	  REVIEW OF SYSTEMS:  CONSTITUTIONAL: No fever, weight loss, or fatigue  EYES: No eye pain, visual disturbances, or discharge  ENT:  No difficulty hearing, tinnitus, vertigo; No sinus or throat pain  NECK: No pain or stiffness  RESPIRATORY: No cough, wheezing, chills or hemoptysis; No Shortness of Breath  CARDIOVASCULAR: No chest pain, palpitations, passing out, dizziness, or leg swelling  GASTROINTESTINAL: No abdominal or epigastric pain. No nausea, vomiting, or hematemesis; No diarrhea or constipation. No melena or hematochezia.  GENITOURINARY: No dysuria, frequency, hematuria, or incontinence  NEUROLOGICAL: No headaches, memory loss, loss of strength, numbness, or tremors  SKIN: No itching, burning, rashes, or lesions   LYMPH Nodes: No enlarged glands  ENDOCRINE: No heat or cold intolerance; No hair loss  MUSCULOSKELETAL: No joint pain or swelling; No muscle, back, or extremity pain  PSYCHIATRIC: No depression, anxiety, mood swings, or difficulty sleeping  HEME/LYMPH: No easy bruising, or bleeding gums  ALLERGY AND IMMUNOLOGIC: No hives or eczema	    [ ] All others negative	  [ ] Unable to obtain    PHYSICAL EXAM:  T(C): 36.9 (07-01-22 @ 04:35), Max: 36.9 (07-01-22 @ 04:35)  HR: 87 (07-01-22 @ 04:35) (64 - 87)  BP: 131/79 (07-01-22 @ 04:35) (103/60 - 148/82)  RR: 17 (07-01-22 @ 04:35) (17 - 17)  SpO2: 93% (07-01-22 @ 04:35) (93% - 95%)  Wt(kg): --  I&O's Summary    30 Jun 2022 07:01  -  01 Jul 2022 07:00  --------------------------------------------------------  IN: 480 mL / OUT: 3600 mL / NET: -3120 mL        Appearance: Normal	  HEENT:   Normal oral mucosa, PERRL, EOMI	  Lymphatic: No lymphadenopathy  Cardiovascular: Normal S1 S2, No JVD, +murmurs, + edema  Respiratory: Lungs clear to auscultation	  Psychiatry: A & O x 3, Mood & affect appropriate  Gastrointestinal:  Soft, Non-tender, + BS	  Skin: No rashes, No ecchymoses, No cyanosis	  Neurologic: Non-focal  Extremities: Normal range of motion, No clubbing, cyanosis , + edema  Vascular: Peripheral pulses palpable 2+ bilaterally    MEDICATIONS  (STANDING):  apixaban 5 milliGRAM(s) Oral every 12 hours  aspirin enteric coated 81 milliGRAM(s) Oral daily  buMETAnide Injectable 1 milliGRAM(s) IV Push two times a day  cephalexin 500 milliGRAM(s) Oral three times a day  escitalopram 5 milliGRAM(s) Oral daily  levothyroxine 137 MICROGram(s) Oral daily  metoprolol succinate  milliGRAM(s) Oral daily  spironolactone 25 milliGRAM(s) Oral daily      TELEMETRY: 	    ECG:  	  RADIOLOGY:  OTHER: 	  	  LABS:	 	    CARDIAC MARKERS:                                10.6   5.58  )-----------( 169      ( 01 Jul 2022 06:41 )             33.9           proBNP: Serum Pro-Brain Natriuretic Peptide: 1983 pg/mL (06-28 @ 17:42)    Lipid Profile:   HgA1c:   TSH:         Assessment and plan  ---------------------------  78 y/o male PMHx HTN, HLD, CAD s/p stent on ASA, A-fib, hypothyroid, CHF now presenting to the ED worsening SOB, DUNCAN, peripheral edema, scrotal edema and 20 pound unintentional weight gain over last month. Patient reported non compliance with diuretics as he had doctors appts to attend to and did not want to persistently have to void. Patient is experiencing difficulty voiding 2/2 scrotal edema. Denied CP, cough, fever, N/V/D,  pt with sig cardiac hx with sob/ edema  chf ?systolic acute on chronic, MR  will adjust cardiac meds  iv lasix  repeat echo to check for MR  beta blocker/ ace/bumex /aldactone  continue ac sec to a.fib  strict i/o  check lytes  pvd on Pletal, normal LVF, pletal is contraindicated in pt with CHF.  oob to chair      	        
  afberile    REVIEW OF SYSTEMS:  GEN: no fever,    no chills  RESP: no SOB,   no cough  CVS: no chest pain,   no palpitations  GI: no abdominal pain,   no nausea,   no vomiting,   no constipation,   no diarrhea  : no dysuria,   no frequency  NEURO: no headache,   no dizziness  PSYCH: no depression,   not anxious  Derm : no rash    MEDICATIONS  (STANDING):  apixaban 5 milliGRAM(s) Oral every 12 hours  aspirin enteric coated 81 milliGRAM(s) Oral daily  buMETAnide Injectable 1 milliGRAM(s) IV Push two times a day  cephalexin 500 milliGRAM(s) Oral three times a day  escitalopram 5 milliGRAM(s) Oral daily  levothyroxine 137 MICROGram(s) Oral daily  metoprolol succinate  milliGRAM(s) Oral daily  spironolactone 25 milliGRAM(s) Oral daily    MEDICATIONS  (PRN):      Vital Signs Last 24 Hrs  T(C): 36.9 (01 Jul 2022 04:35), Max: 36.9 (01 Jul 2022 04:35)  T(F): 98.5 (01 Jul 2022 04:35), Max: 98.5 (01 Jul 2022 04:35)  HR: 87 (01 Jul 2022 04:35) (64 - 87)  BP: 131/79 (01 Jul 2022 04:35) (103/60 - 148/82)  BP(mean): --  RR: 17 (01 Jul 2022 04:35) (17 - 17)  SpO2: 93% (01 Jul 2022 04:35) (93% - 95%)  CAPILLARY BLOOD GLUCOSE        I&O's Summary    30 Jun 2022 07:01  -  01 Jul 2022 07:00  --------------------------------------------------------  IN: 480 mL / OUT: 3600 mL / NET: -3120 mL        PHYSICAL EXAM:  HEAD:  Atraumatic, Normocephalic  NECK: Supple, No   JVD  CHEST/LUNG:   no     rales,     no,    rhonchi  HEART: Regular rate and rhythm;         murmur  ABDOMEN: Soft, Nontender, ;   EXTREMITIES:    less    edema  NEUROLOGY:  alert    LABS:                        10.6   5.58  )-----------( 169      ( 01 Jul 2022 06:41 )             33.9     07-01    138  |  100  |  24<H>  ----------------------------<  102<H>  4.0   |  27  |  1.19    Ca    8.7      01 Jul 2022 06:43  Phos  2.4     07-01  Mg     1.8     07-01                              Consultant(s) Notes Reviewed:      Care Discussed with Consultants/Other Providers:    
    afberile  REVIEW OF SYSTEMS:  GEN: no fever,    no chills  RESP: no SOB,   no cough  CVS: no chest pain,   no palpitations  GI: no abdominal pain,   no nausea,   no vomiting,   no constipation,   no diarrhea  : no dysuria,   no frequency  NEURO: no headache,   no dizziness  PSYCH: no depression,   not anxious  Derm : no rash    MEDICATIONS  (STANDING):  apixaban 5 milliGRAM(s) Oral every 12 hours  aspirin enteric coated 81 milliGRAM(s) Oral daily  buMETAnide 2 milliGRAM(s) Oral daily  cephalexin 500 milliGRAM(s) Oral three times a day  escitalopram 5 milliGRAM(s) Oral daily  levothyroxine 137 MICROGram(s) Oral daily  metoprolol succinate  milliGRAM(s) Oral daily  spironolactone 25 milliGRAM(s) Oral daily    MEDICATIONS  (PRN):      Vital Signs Last 24 Hrs  T(C): 36.4 (03 Jul 2022 05:37), Max: 36.4 (02 Jul 2022 12:52)  T(F): 97.5 (03 Jul 2022 05:37), Max: 97.6 (02 Jul 2022 12:52)  HR: 63 (03 Jul 2022 05:37) (62 - 75)  BP: 129/63 (03 Jul 2022 05:37) (123/71 - 137/79)  BP(mean): --  RR: 18 (03 Jul 2022 05:37) (17 - 18)  SpO2: 91% (03 Jul 2022 05:37) (91% - 98%)  CAPILLARY BLOOD GLUCOSE        I&O's Summary    02 Jul 2022 07:01  -  03 Jul 2022 07:00  --------------------------------------------------------  IN: 0 mL / OUT: 2550 mL / NET: -2550 mL        PHYSICAL EXAM:  HEAD:  Atraumatic, Normocephalic  NECK: Supple, No   JVD  CHEST/LUNG:   no     rales,     no,    rhonchi  HEART: Regular rate and rhythm;         murmur  ABDOMEN: Soft, Nontender, ;   EXTREMITIES:   less     edema  NEUROLOGY:  alert    LABS:    07-03    142  |  99  |  27<H>  ----------------------------<  99  4.4   |  34<H>  |  1.15    Ca    9.2      03 Jul 2022 06:35  Phos  3.4     07-03  Mg     1.9     07-03                              Consultant(s) Notes Reviewed:      Care Discussed with Consultants/Other Providers:    
    afberile  REVIEW OF SYSTEMS:  GEN: no fever,    no chills  RESP: no SOB,   no cough  CVS: no chest pain,   no palpitations  GI: no abdominal pain,   no nausea,   no vomiting,   no constipation,   no diarrhea  : no dysuria,   no frequency  NEURO: no headache,   no dizziness  PSYCH: no depression,   not anxious  Derm : no rash    MEDICATIONS  (STANDING):  apixaban 5 milliGRAM(s) Oral every 12 hours  aspirin enteric coated 81 milliGRAM(s) Oral daily  buMETAnide Injectable 1 milliGRAM(s) IV Push two times a day  cephalexin 500 milliGRAM(s) Oral three times a day  escitalopram 5 milliGRAM(s) Oral daily  levothyroxine 137 MICROGram(s) Oral daily  metoprolol succinate  milliGRAM(s) Oral daily  spironolactone 25 milliGRAM(s) Oral daily    MEDICATIONS  (PRN):      Vital Signs Last 24 Hrs  T(C): 36.6 (2022 04:10), Max: 36.7 (2022 20:03)  T(F): 97.8 (2022 04:10), Max: 98 (2022 20:03)  HR: 63 (2022 04:10) (63 - 70)  BP: 106/64 (2022 04:10) (106/64 - 113/67)  BP(mean): --  RR: 18 (2022 04:10) (18 - 18)  SpO2: 97% (2022 04:10) (96% - 97%)  CAPILLARY BLOOD GLUCOSE        I&O's Summary    2022 07:01  -  2022 07:00  --------------------------------------------------------  IN: 100 mL / OUT: 1350 mL / NET: -1250 mL        PHYSICAL EXAM:  HEAD:  Atraumatic, Normocephalic  NECK: Supple, No   JVD  CHEST/LUNG:   no     rales,     no,    rhonchi  HEART: Regular rate and rhythm;         murmur  ABDOMEN: Soft, Nontender, ;   EXTREMITIES:   less     edema  NEUROLOGY:  alert    LABS:                        9.7    5.03  )-----------( 142      ( 2022 06:41 )             31.1         140  |  106  |  17  ----------------------------<  106<H>  3.8   |  26  |  1.02    Ca    8.6      2022 06:42    TPro  6.7  /  Alb  3.7  /  TBili  0.9  /  DBili  x   /  AST  23  /  ALT  12  /  AlkPhos  118  06-28    PT/INR - ( 2022 17:42 )   PT: 15.6 sec;   INR: 1.35 ratio         PTT - ( 2022 17:42 )  PTT:32.2 sec      Urinalysis Basic - ( 2022 18:52 )    Color: Yellow / Appearance: Clear / S.029 / pH: x  Gluc: x / Ketone: Negative  / Bili: Negative / Urobili: 2 mg/dL   Blood: x / Protein: 30 mg/dL / Nitrite: Negative   Leuk Esterase: Negative / RBC: 1 /hpf / WBC 1 /HPF   Sq Epi: x / Non Sq Epi: 0 /hpf / Bacteria: Negative                      Consultant(s) Notes Reviewed:      Care Discussed with Consultants/Other Providers:    
    afberile  REVIEW OF SYSTEMS:  GEN: no fever,    no chills  RESP: no SOB,   no cough  CVS: no chest pain,   no palpitations  GI: no abdominal pain,   no nausea,   no vomiting,   no constipation,   no diarrhea  : no dysuria,   no frequency  NEURO: no headache,   no dizziness  PSYCH: no depression,   not anxious  Derm : no rash    MEDICATIONS  (STANDING):  apixaban 5 milliGRAM(s) Oral every 12 hours  aspirin enteric coated 81 milliGRAM(s) Oral daily  escitalopram 5 milliGRAM(s) Oral daily  levothyroxine 137 MICROGram(s) Oral daily  metoprolol succinate  milliGRAM(s) Oral daily  spironolactone 25 milliGRAM(s) Oral daily  torsemide 20 milliGRAM(s) Oral daily    MEDICATIONS  (PRN):      Vital Signs Last 24 Hrs  T(C): 36.5 (05 Jul 2022 05:20), Max: 36.9 (04 Jul 2022 21:00)  T(F): 97.7 (05 Jul 2022 05:20), Max: 98.4 (04 Jul 2022 21:00)  HR: 70 (05 Jul 2022 05:20) (66 - 70)  BP: 114/68 (05 Jul 2022 08:04) (107/69 - 114/68)  BP(mean): --  RR: 18 (05 Jul 2022 08:04) (18 - 18)  SpO2: 96% (05 Jul 2022 08:04) (94% - 96%)  CAPILLARY BLOOD GLUCOSE        I&O's Summary    04 Jul 2022 07:01  -  05 Jul 2022 07:00  --------------------------------------------------------  IN: 900 mL / OUT: 2500 mL / NET: -1600 mL        PHYSICAL EXAM:  HEAD:  Atraumatic, Normocephalic  NECK: Supple, No   JVD  CHEST/LUNG:   no     rales,     no,    rhonchi  HEART: Regular rate and rhythm;         murmur  ABDOMEN: Soft, Nontender, ;   EXTREMITIES:    less    edema  NEUROLOGY:  alert    LABS:    07-05    138  |  98  |  38<H>  ----------------------------<  90  4.0   |  32<H>  |  1.43<H>    Ca    9.2      05 Jul 2022 06:53  Phos  3.4     07-04  Mg     2.0     07-04                              Consultant(s) Notes Reviewed:      Care Discussed with Consultants/Other Providers:    
    afberile  REVIEW OF SYSTEMS:  GEN: no fever,    no chills  RESP: no SOB,   no cough  CVS: no chest pain,   no palpitations  GI: no abdominal pain,   no nausea,   no vomiting,   no constipation,   no diarrhea  : no dysuria,   no frequency  NEURO: no headache,   no dizziness  PSYCH: no depression,   not anxious  Derm : no rash    MEDICATIONS  (STANDING):  aspirin enteric coated 81 milliGRAM(s) Oral daily  escitalopram 5 milliGRAM(s) Oral daily  heparin  Infusion.  Unit(s)/Hr (20 mL/Hr) IV Continuous <Continuous>  levothyroxine 137 MICROGram(s) Oral daily  metoprolol succinate  milliGRAM(s) Oral daily  spironolactone 25 milliGRAM(s) Oral daily  torsemide 20 milliGRAM(s) Oral daily    MEDICATIONS  (PRN):  heparin   Injectable 9000 Unit(s) IV Push every 6 hours PRN For aPTT less than 40  heparin   Injectable 4500 Unit(s) IV Push every 6 hours PRN For aPTT between 40 - 57      Vital Signs Last 24 Hrs  T(C): 36.9 (07 Jul 2022 04:58), Max: 36.9 (07 Jul 2022 04:58)  T(F): 98.4 (07 Jul 2022 04:58), Max: 98.4 (07 Jul 2022 04:58)  HR: 64 (07 Jul 2022 04:58) (59 - 68)  BP: 106/63 (07 Jul 2022 04:58) (98/58 - 111/59)  BP(mean): --  RR: 18 (07 Jul 2022 04:58) (18 - 18)  SpO2: 96% (07 Jul 2022 04:58) (93% - 99%)  CAPILLARY BLOOD GLUCOSE      POCT Blood Glucose.: 108 mg/dL (06 Jul 2022 21:34)    I&O's Summary    06 Jul 2022 07:01  -  07 Jul 2022 07:00  --------------------------------------------------------  IN: 720 mL / OUT: 1200 mL / NET: -480 mL        PHYSICAL EXAM:  HEAD:  Atraumatic, Normocephalic  NECK: Supple, No   JVD  CHEST/LUNG:   no     rales,     no,    rhonchi  HEART: Regular rate and rhythm;         murmur  ABDOMEN: Soft, Nontender, ;   EXTREMITIES:    less    edema  NEUROLOGY:  alert    LABS:                        11.6   4.62  )-----------( 159      ( 06 Jul 2022 17:00 )             35.3     07-06    140  |  98  |  46<H>  ----------------------------<  98  4.0   |  32<H>  |  1.36<H>    Ca    9.3      06 Jul 2022 07:05      PTT - ( 07 Jul 2022 01:25 )  PTT:165.2 sec                        Consultant(s) Notes Reviewed:      Care Discussed with Consultants/Other Providers:    
  afberile    REVIEW OF SYSTEMS:  GEN: no fever,    no chills  RESP: no SOB,   no cough  CVS: no chest pain,   no palpitations  GI: no abdominal pain,   no nausea,   no vomiting,   no constipation,   no diarrhea  : no dysuria,   no frequency  NEURO: no headache,   no dizziness  PSYCH: no depression,   not anxious  Derm : no rash    MEDICATIONS  (STANDING):  apixaban 5 milliGRAM(s) Oral every 12 hours  aspirin enteric coated 81 milliGRAM(s) Oral daily  escitalopram 5 milliGRAM(s) Oral daily  levothyroxine 137 MICROGram(s) Oral daily  metoprolol succinate  milliGRAM(s) Oral daily  spironolactone 25 milliGRAM(s) Oral daily  torsemide 20 milliGRAM(s) Oral daily    MEDICATIONS  (PRN):      Vital Signs Last 24 Hrs  T(C): 36.3 (06 Jul 2022 04:54), Max: 36.9 (05 Jul 2022 20:22)  T(F): 97.3 (06 Jul 2022 04:54), Max: 98.4 (05 Jul 2022 20:22)  HR: 70 (06 Jul 2022 04:54) (63 - 71)  BP: 108/65 (06 Jul 2022 04:54) (108/65 - 125/82)  BP(mean): --  RR: 18 (06 Jul 2022 04:54) (18 - 18)  SpO2: 94% (06 Jul 2022 04:54) (91% - 96%)  CAPILLARY BLOOD GLUCOSE        I&O's Summary    05 Jul 2022 07:01  -  06 Jul 2022 07:00  --------------------------------------------------------  IN: 600 mL / OUT: 4950 mL / NET: -4350 mL        PHYSICAL EXAM:  HEAD:  Atraumatic, Normocephalic  NECK: Supple, No   JVD  CHEST/LUNG:   no     rales,     no,    rhonchi  HEART: Regular rate and rhythm;         murmur  ABDOMEN: Soft, Nontender, ;   EXTREMITIES:   less     edema  NEUROLOGY:  alert    LABS:    07-06    140  |  98  |  46<H>  ----------------------------<  98  4.0   |  32<H>  |  1.36<H>    Ca    9.3      06 Jul 2022 07:05                              Consultant(s) Notes Reviewed:      Care Discussed with Consultants/Other Providers:

## 2022-07-08 NOTE — DISCHARGE NOTE PROVIDER - NSDCMRMEDTOKEN_GEN_ALL_CORE_FT
acetaminophen 325 mg oral tablet: 2 tab(s) orally every 6 hours, As needed, Mild Pain (1 - 3)  aspirin 81 mg oral tablet: 1  orally once a day  cilostazol 100 mg oral tablet: 1 tab(s) orally 2 times a day  clotrimazole 1% topical cream: 1 application topically 2 times a day  Coumadin 4 mg oral tablet: 1 tab(s) orally once a day (at bedtime)  escitalopram 5 mg oral tablet: 1 tab(s) orally once a day  ferrous sulfate 325 mg (65 mg elemental iron) oral tablet: 1 tab(s) orally once a day  folic acid 1 mg oral tablet: 1 tab(s) orally once a day  hydrocortisone 1% topical cream: 1 application topically once a day  metoprolol succinate 100 mg oral tablet, extended release: 1 tab(s) orally once a day  nystatin 100,000 units/g topical cream: 1 application topically 2 times a day  spironolactone 25 mg oral tablet: 1 tab(s) orally every 12 hours  Synthroid 137 mcg (0.137 mg) oral tablet: 1 tab(s) orally once a day  torsemide 20 mg oral tablet: 1 tab(s) orally once a day  Vitamin C 500 mg oral tablet: 1 tab(s) orally once a day  Vitamin D3 2000 intl units (50 mcg) oral tablet: 1  orally once a day  Vytorin 10 mg-40 mg oral tablet: 1 tab(s) orally once a day   acetaminophen 325 mg oral tablet: 2 tab(s) orally every 6 hours, As needed, Mild Pain (1 - 3)  apixaban 5 mg oral tablet: 1 tab(s) orally every 12 hours  aspirin 81 mg oral tablet: 1  orally once a day  cilostazol 100 mg oral tablet: 1 tab(s) orally 2 times a day  clotrimazole 1% topical cream: 1 application topically 2 times a day  escitalopram 5 mg oral tablet: 1 tab(s) orally once a day  ferrous sulfate 325 mg (65 mg elemental iron) oral tablet: 1 tab(s) orally once a day  folic acid 1 mg oral tablet: 1 tab(s) orally once a day  hydrocortisone 1% topical cream: 1 application topically once a day  metoprolol succinate 100 mg oral tablet, extended release: 1 tab(s) orally once a day  nystatin 100,000 units/g topical cream: 1 application topically 2 times a day  spironolactone 25 mg oral tablet: 1 tab(s) orally once a day  Synthroid 137 mcg (0.137 mg) oral tablet: 1 tab(s) orally once a day  torsemide 20 mg oral tablet: 1 tab(s) orally once a day  Vitamin C 500 mg oral tablet: 1 tab(s) orally once a day  Vitamin D3 2000 intl units (50 mcg) oral tablet: 1  orally once a day  Vytorin 10 mg-40 mg oral tablet: 1 tab(s) orally once a day

## 2022-07-08 NOTE — PROGRESS NOTE ADULT - PROVIDER SPECIALTY LIST ADULT
Cardiology
Cardiology
Internal Medicine
Internal Medicine
Cardiology
Internal Medicine

## 2022-07-08 NOTE — DISCHARGE NOTE PROVIDER - NSDCHHATTENDCERT_GEN_ALL_CORE
My signature below certifies that the above stated patient is homebound and upon completion of the Face-To-Face encounter, has the need for intermittent skilled nursing, physical therapy and/or speech or occupational therapy services in their home for their current diagnosis as outlined in their initial plan of care. These services will continue to be monitored by myself or another physician. Area L Indication Text: Tumors in this location are included in Area L (trunk and extremities).  Mohs surgery is indicated for larger tumors, 2 cm or larger, in these anatomic locations.

## 2022-07-08 NOTE — DISCHARGE NOTE PROVIDER - CARE PROVIDERS DIRECT ADDRESSES
,DirectAddress_Unknown ,DirectAddress_Unknown,[email or web address]

## 2022-07-08 NOTE — DISCHARGE NOTE PROVIDER - NSDCFUADDAPPT_GEN_ALL_CORE_FT
APPTS ARE READY TO BE MADE: [ X] YES    Best Family or Patient Contact (if needed):    Additional Information about above appointments (if needed):    1: Dr. Lainez  2:   3:     Other comments or requests:    APPTS ARE READY TO BE MADE: [ X] YES    Best Family or Patient Contact (if needed):    Additional Information about above appointments (if needed):    1: Dr. Lainez  2:   3:     Other comments or requests:   Patient was provided with follow up request details and was advised to call to schedule follow up within specified time frame.

## 2022-07-08 NOTE — DISCHARGE NOTE PROVIDER - HOSPITAL COURSE
77 year old male h/o CAD s/p stent on ASA, A-fib/ PPM, hypothyroid and total left knee replacement.  S/p rash across the upper torso/ back  for past week, pruritic on R arm, and rash in the groin.   Prior PPM interrogation with [facility].  S/p brief bursts  of  afib on prior visit.  Ct chest: retrosternal hematoma, mod left pl effusion.     Admitted with sob, pedal edema/scrotal edema from acute diastolic hf; related to non-compliance with his lasix  c/c  lymphedema, carla left  leg   *  CAD/AFIb, PPM, HTN, stent in 2007   *  h/o AFIB, was not on a/c   * Anemia, hb stable, had been seen by gi eval Dr. joann Barba in past   Right leg with distal redness, not cellulitis  c/c  for past few months and left leg lymphedema.   * Echo, normal ef/severe MR.  S/p  MVR.  Cath with 2 vessel disease,  s/p CABG and MVR surg  with Dr. Daniel.  *  h/o  Afib on Eliquis, on asa, lipitor, toprol, synthroid  c/c leg redness, 4+ edema, on keflex, has improved.  h/o non-compliance with meds.  Was on iv bumex, aldactone & has lost 9kg.  On po torsemide now  mild peter, follow  crt  Echo,  ef 35, new severe TR, card f/p    < from: Cardiac Catheterization (07.07.22 @ 16:53) >  Coronary Angiography   The coronary circulation is right dominant.    LM   Left main artery: Angiography shows mild atherosclerosis.    LAD   Mid left anterior descending: There is a 50 % stenosis. First  diagonal: There is a 60 % stenosis. Second diagonal: There is a  90 % stenosis.    CX   Proximal circumflex: There is a 95 % stenosis.    RCA   Right coronary artery: Angiography shows mild atherosclerosis.    Graft Angiography   LIMA graft to Second diagonal: Angiography shows no disease.    SVG graft to First obtuse marginal: Angiography shows no disease.      f/p with Dr. Roni Lainez       [age] year old male    h/o   , CAD s/p stent on ASA, A-fib/ PPM, , hypothyroid, and total left knee replacement    s/p rash across the upper torso/ back  for past week, pruritic on R arm, and rash in the groin     prior  PPM  interrogation  with  [facility]/   s/p  brief   bursts  of  afib/ , on prior visit   Ct chest, retrosternal hematoma.  mod left pl effusion          admitted with sob, / pedal  edema/ scrotal edema  from acute  diastolic  chf, related  to non compliance  with his  lasix     c/c   lymphedema,  carla left  leg   *  CAD/ AFIb  /PPM,/ HTN   , stent in 2007   *  h/o  AFIB,  was  not  on  a/c    * Anemia, , hb stable, had  been seen by  gi  eval dr joann sanchez in past    right leg with distal redness.  not cellulitis / c/c  for past few  months  an d left leg lymphedema      * Echo, normal  ef/ severe MR .  s/p  MVR    cath, with 2 vessel disease,  s/p  CABG and  MVR  surg d r marni   *  h/o  Afib on    eliquis  on asa.  lipitor, torpol,  synthroid    c/c leg redness/  4 +  edema, on keflex, has  improved     h/o non compalince  with meds    spoke  wth wife  at  length   was  on iv  bumex/  aldactone/ has lost 9  kg   on po  toresemide  now  mild  peter/ follow  crt   echo,  ef  35/ new/ severe  TR/ card f/p      cath, no obstrucive   plan,   d/c  home /  f/p pmd         f/p wih  dr roni dick                                        < from: Cardiac Catheterization (07.07.22 @ 16:53) >  Coronary Angiography   The coronary circulation is right dominant.    LM   Left main artery: Angiography shows mild atherosclerosis.    LAD   Mid left anterior descending: There is a 50 % stenosis. First  diagonal: There is a 60 % stenosis. Second diagonal: There is a  90 % stenosis.    CX   Proximal circumflex: There is a 95 % stenosis.    RCA   Right coronary artery: Angiography shows mild atherosclerosis.    Graft Angiography   LIMA graft to Second diagonal: Angiography shows no disease.    SVG graft to First obtuse marginal: Angiography shows no disease.      f/p with Dr. Roni Lainez

## 2022-07-08 NOTE — DISCHARGE NOTE PROVIDER - NSDCCPCAREPLAN_GEN_ALL_CORE_FT
PRINCIPAL DISCHARGE DIAGNOSIS  Diagnosis: CHF exacerbation  Assessment and Plan of Treatment: Weigh yourself daily.  If you gain 3lbs in 3 days, or 5lbs in a week call your Health Care Provider.  Do not eat or drink foods containing more than 2000mg of salt (sodium) in your diet every day.  Call your Health Care Provider if you have any swelling or increased swelling in your feet, ankles, and/or stomach.  Take all of your medication as directed.  If you become dizzy call your Health Care Provider.

## 2022-07-08 NOTE — PROGRESS NOTE ADULT - ASSESSMENT
77  year old male    h/o   , CAD s/p stent on ASA, A-fib/ PPM, , hypothyroid, and total left knee replacement    s/p rash across the upper torso/ back  for past week, pruritic on R arm, and rash in the groin     prior  PPM  interrogation  with  [facility]/   s/p  brief   bursts  of  afib/ , on prior visit   Ct chest, retrosternal hematoma.  mod left pl effusion          admitted with sob, / pedal  edema/ scrotal edema  from acute  diastolic  chf, related  to non compliance  with his  lasix     c/c   lymphedema,  carla left  leg   *  CAD/ AFIb  /PPM,/ HTN   , stent in 2007   *  h/o  AFIB,  was  not  on  a/c    * Anemia, , hb stable, had  been seen by  gi  eval dr joann sanchez in past    right leg with distal redness.  not cellulitis / c/c  for past few  months  an d left leg lymphedema      * Echo, normal  ef/ severe MR .  s/p  MVR    cath, with 2 vessel disease,  s/p  CABG and  MVR  surg d r marni   *  h/o  Afib on    eliquis  on asa.  lipitor, torpol,  synthroid    c/c leg redness/  4 +  edema, on keflex, has  improved     h/o non compalince  with meds    spoke  wth wife  at  length   was  on iv  bumex/  aldactone/ has lost 9  kg   on po  toresemide  now  mild  peter/ follow  crt   echo,  ef  35/ new/ severe  TR/ card f/p      cath, no obstrucive   plan,   d/c  home /  f/p pmd         f/p wih  dr mikayla dick

## 2022-07-08 NOTE — DISCHARGE NOTE PROVIDER - NSDCFUSCHEDAPPT_GEN_ALL_CORE_FT
Brian, [facility] [address]  Scheduled Appointment: 07/19/2022

## 2022-07-14 NOTE — HISTORY OF PRESENT ILLNESS
[FreeTextEntry1] : Liam is a 76 year old male here for a follow up visit. \par \par 1/20/21 Colonoscopy - The examined portion of the ileum was normal. One polyp in the transverse colon, removed with a hot snare, resected and retrieved. One polyp in the sigmoid colon, removed with a hot biopsy forceps, resected and retrieved. Diverticulosis in the sigmoid colon. Stool in the ascending colon and in the cecum. Internal hemorrhoids.\par He was last seen on 05/24/2022,  the patient has bleeding internal hemorrhoids. He takes Xarelto and aspirin. I explained that if his Xarelto and aspirin can be held for 2 days prior to the procedure and for a week afterward I would recommend rubber band ligations in the office. If his anticoagulation cannot be held for this length of time I would recommend interventional radiology internal hemorrhoid embolization. \par \par

## 2022-07-15 NOTE — H&P PST ADULT - CARDIOVASCULAR DETAILS
Patient is a 63 yo female with PMHx CKD, hypercalcemia, gout, anemia, DM2 on metformin, psoriasis, HTN, sarcoidosis, kidney stones presenting with 2-3 days of polyuria, polydipsia, thirst, and pelvic pain. Patient states for 2-3 days she has had increased urinary frequency, thirst, polydipsia and had an episode of lightheadedness today where she felt like passing out at which point patient came to the ED; denies dysuria, hematuria, difficulty urinating, back pain. Patient has had intermittent 5/10 dull LUQ pain for several months. Denies fevers, n/v, CP, SOB. Patient had a kidney stone in the past which she passed on her own. Denies fevers, chills, dizziness, dysphagia, dysarthria, diplopia, photophobia, syncope, cough, congestion, SOB, CP, abdominal pain, neck pain, back pain, diarrhea, dysuria, hematuria, hematochezia, hematemesis, n/v, recent travel, sick contacts, leg swelling. murmur

## 2022-07-19 ENCOUNTER — APPOINTMENT (OUTPATIENT)
Dept: SURGERY | Facility: CLINIC | Age: 77
End: 2022-07-19

## 2022-07-20 ENCOUNTER — APPOINTMENT (OUTPATIENT)
Dept: CARE COORDINATION | Facility: HOME HEALTH | Age: 77
End: 2022-07-20

## 2022-07-21 ENCOUNTER — APPOINTMENT (OUTPATIENT)
Dept: OPHTHALMOLOGY | Facility: CLINIC | Age: 77
End: 2022-07-21

## 2022-07-21 ENCOUNTER — NON-APPOINTMENT (OUTPATIENT)
Age: 77
End: 2022-07-21

## 2022-07-21 PROCEDURE — 92134 CPTRZ OPH DX IMG PST SGM RTA: CPT

## 2022-07-21 PROCEDURE — 92014 COMPRE OPH EXAM EST PT 1/>: CPT

## 2022-07-21 PROCEDURE — 92136 OPHTHALMIC BIOMETRY: CPT

## 2022-08-08 ENCOUNTER — APPOINTMENT (OUTPATIENT)
Dept: OPHTHALMOLOGY | Facility: CLINIC | Age: 77
End: 2022-08-08

## 2022-08-19 ENCOUNTER — APPOINTMENT (OUTPATIENT)
Dept: OPHTHALMOLOGY | Facility: EYE CENTER | Age: 77
End: 2022-08-19

## 2022-08-19 ENCOUNTER — NON-APPOINTMENT (OUTPATIENT)
Age: 77
End: 2022-08-19

## 2022-08-19 PROCEDURE — 66984 XCAPSL CTRC RMVL W/O ECP: CPT | Mod: RT

## 2022-08-20 ENCOUNTER — NON-APPOINTMENT (OUTPATIENT)
Age: 77
End: 2022-08-20

## 2022-08-20 ENCOUNTER — APPOINTMENT (OUTPATIENT)
Dept: OPHTHALMOLOGY | Facility: CLINIC | Age: 77
End: 2022-08-20

## 2022-08-20 PROCEDURE — 99024 POSTOP FOLLOW-UP VISIT: CPT

## 2022-08-25 ENCOUNTER — NON-APPOINTMENT (OUTPATIENT)
Age: 77
End: 2022-08-25

## 2022-08-25 ENCOUNTER — APPOINTMENT (OUTPATIENT)
Dept: OPHTHALMOLOGY | Facility: CLINIC | Age: 77
End: 2022-08-25

## 2022-08-25 PROCEDURE — 99024 POSTOP FOLLOW-UP VISIT: CPT

## 2022-08-31 ENCOUNTER — APPOINTMENT (OUTPATIENT)
Dept: OPHTHALMOLOGY | Facility: CLINIC | Age: 77
End: 2022-08-31

## 2022-08-31 ENCOUNTER — NON-APPOINTMENT (OUTPATIENT)
Age: 77
End: 2022-08-31

## 2022-08-31 PROCEDURE — 99214 OFFICE O/P EST MOD 30 MIN: CPT | Mod: 24

## 2022-09-22 ENCOUNTER — APPOINTMENT (OUTPATIENT)
Dept: OPHTHALMOLOGY | Facility: CLINIC | Age: 77
End: 2022-09-22

## 2022-09-22 ENCOUNTER — NON-APPOINTMENT (OUTPATIENT)
Age: 77
End: 2022-09-22

## 2022-09-22 PROCEDURE — 92015 DETERMINE REFRACTIVE STATE: CPT

## 2022-09-22 PROCEDURE — 99024 POSTOP FOLLOW-UP VISIT: CPT

## 2022-09-23 NOTE — PROGRESS NOTE ADULT - PROBLEM/PLAN-1
DISPLAY PLAN FREE TEXT
Not Applicable

## 2022-12-22 ENCOUNTER — APPOINTMENT (OUTPATIENT)
Dept: OPHTHALMOLOGY | Facility: CLINIC | Age: 77
End: 2022-12-22

## 2022-12-22 ENCOUNTER — NON-APPOINTMENT (OUTPATIENT)
Age: 77
End: 2022-12-22

## 2022-12-22 PROCEDURE — 92286 ANT SGM IMG I&R SPECLR MIC: CPT

## 2022-12-22 PROCEDURE — 92014 COMPRE OPH EXAM EST PT 1/>: CPT

## 2023-03-02 ENCOUNTER — APPOINTMENT (OUTPATIENT)
Dept: OPHTHALMOLOGY | Facility: CLINIC | Age: 78
End: 2023-03-02
Payer: MEDICARE

## 2023-03-02 ENCOUNTER — NON-APPOINTMENT (OUTPATIENT)
Age: 78
End: 2023-03-02

## 2023-03-02 PROCEDURE — 92285 EXTERNAL OCULAR PHOTOGRAPHY: CPT

## 2023-03-02 PROCEDURE — 92012 INTRM OPH EXAM EST PATIENT: CPT

## 2023-03-25 ENCOUNTER — INPATIENT (INPATIENT)
Facility: HOSPITAL | Age: 78
LOS: 36 days | Discharge: NURSING, REHAB OR LONG-TERM CARE | DRG: 228 | End: 2023-05-01
Attending: INTERNAL MEDICINE | Admitting: INTERNAL MEDICINE
Payer: MEDICARE

## 2023-03-25 VITALS
RESPIRATION RATE: 20 BRPM | SYSTOLIC BLOOD PRESSURE: 123 MMHG | HEART RATE: 112 BPM | TEMPERATURE: 99 F | DIASTOLIC BLOOD PRESSURE: 68 MMHG | OXYGEN SATURATION: 100 %

## 2023-03-25 DIAGNOSIS — Z98.890 OTHER SPECIFIED POSTPROCEDURAL STATES: Chronic | ICD-10-CM

## 2023-03-25 DIAGNOSIS — Z98.49 CATARACT EXTRACTION STATUS, UNSPECIFIED EYE: Chronic | ICD-10-CM

## 2023-03-25 DIAGNOSIS — Z96.652 PRESENCE OF LEFT ARTIFICIAL KNEE JOINT: Chronic | ICD-10-CM

## 2023-03-25 DIAGNOSIS — Z95.0 PRESENCE OF CARDIAC PACEMAKER: Chronic | ICD-10-CM

## 2023-03-25 DIAGNOSIS — Z95.5 PRESENCE OF CORONARY ANGIOPLASTY IMPLANT AND GRAFT: Chronic | ICD-10-CM

## 2023-03-25 DIAGNOSIS — L03.113 CELLULITIS OF RIGHT UPPER LIMB: ICD-10-CM

## 2023-03-25 PROCEDURE — 99285 EMERGENCY DEPT VISIT HI MDM: CPT | Mod: FS

## 2023-03-25 PROCEDURE — 93971 EXTREMITY STUDY: CPT | Mod: 26,LT

## 2023-03-25 PROCEDURE — 73590 X-RAY EXAM OF LOWER LEG: CPT | Mod: 26,LT

## 2023-03-25 PROCEDURE — 71045 X-RAY EXAM CHEST 1 VIEW: CPT | Mod: 26

## 2023-03-25 RX ORDER — CEFAZOLIN SODIUM 1 G
1000 VIAL (EA) INJECTION EVERY 8 HOURS
Refills: 0 | Status: DISCONTINUED | OUTPATIENT
Start: 2023-03-25 | End: 2023-03-28

## 2023-03-25 RX ORDER — HEPARIN SODIUM 5000 [USP'U]/ML
5000 INJECTION INTRAVENOUS; SUBCUTANEOUS EVERY 12 HOURS
Refills: 0 | Status: DISCONTINUED | OUTPATIENT
Start: 2023-03-25 | End: 2023-03-25

## 2023-03-25 RX ORDER — ASCORBIC ACID 60 MG
1 TABLET,CHEWABLE ORAL
Qty: 0 | Refills: 0 | DISCHARGE

## 2023-03-25 RX ORDER — ASPIRIN/CALCIUM CARB/MAGNESIUM 324 MG
81 TABLET ORAL DAILY
Refills: 0 | Status: DISCONTINUED | OUTPATIENT
Start: 2023-03-25 | End: 2023-04-10

## 2023-03-25 RX ORDER — APIXABAN 2.5 MG/1
5 TABLET, FILM COATED ORAL
Refills: 0 | Status: DISCONTINUED | OUTPATIENT
Start: 2023-03-25 | End: 2023-03-28

## 2023-03-25 RX ORDER — METOPROLOL TARTRATE 50 MG
100 TABLET ORAL DAILY
Refills: 0 | Status: DISCONTINUED | OUTPATIENT
Start: 2023-03-25 | End: 2023-05-01

## 2023-03-25 RX ORDER — ATORVASTATIN CALCIUM 80 MG/1
40 TABLET, FILM COATED ORAL AT BEDTIME
Refills: 0 | Status: DISCONTINUED | OUTPATIENT
Start: 2023-03-25 | End: 2023-05-01

## 2023-03-25 RX ORDER — FUROSEMIDE 40 MG
10 TABLET ORAL
Qty: 500 | Refills: 0 | Status: DISCONTINUED | OUTPATIENT
Start: 2023-03-25 | End: 2023-03-26

## 2023-03-25 RX ORDER — LEVOTHYROXINE SODIUM 125 MCG
137 TABLET ORAL DAILY
Refills: 0 | Status: DISCONTINUED | OUTPATIENT
Start: 2023-03-25 | End: 2023-05-01

## 2023-03-25 RX ORDER — BUMETANIDE 0.25 MG/ML
1 INJECTION INTRAMUSCULAR; INTRAVENOUS
Refills: 0 | Status: DISCONTINUED | OUTPATIENT
Start: 2023-03-25 | End: 2023-03-25

## 2023-03-25 RX ADMIN — ATORVASTATIN CALCIUM 40 MILLIGRAM(S): 80 TABLET, FILM COATED ORAL at 21:32

## 2023-03-25 RX ADMIN — Medication 5 MG/HR: at 21:10

## 2023-03-25 RX ADMIN — Medication 100 MILLIGRAM(S): at 21:32

## 2023-03-25 RX ADMIN — APIXABAN 5 MILLIGRAM(S): 2.5 TABLET, FILM COATED ORAL at 23:30

## 2023-03-25 NOTE — ED ADULT NURSE REASSESSMENT NOTE - NS ED NURSE REASSESS COMMENT FT1
See downtime paper chart for updated reassessment on patient. No acute distress present at this time. Call bell within reach, bed in lowest position.

## 2023-03-25 NOTE — CONSULT NOTE ADULT - SUBJECTIVE AND OBJECTIVE BOX
HPI:  77  year old male    h/o  CAD s/p stent , asa.      A-fib/ PPM, , hypothyroid, and total left knee replacement    prior  PPM  interrogation  with  [facility]/   s/p  brief   bursts  of  afib/ , on prior visit   Ct chest, retrosternal hematoma.  mod left pl effusion    was  on bumex/  aldactone          admitted with    pedal  edema/  redness/  scrotal edema     no  feverrs    REVIEW OF SYSTEMS:  CONSTITUTIONAL: No fever,  no  weight loss  ENT:  No  tinnitus,   no   vertigo  NECK: No pain or stiffness  RESPIRATORY: No cough, wheezing, chills or hemoptysis;    No Shortness of Breath  CARDIOVASCULAR: No chest pain, palpitations, dizziness  GASTROINTESTINAL: No abdominal or epigastric pain. No nausea, vomiting, or hematemesis; No diarrhea  No melena or hematochezia.  GENITOURINARY: No dysuria, frequency, hematuria, or incontinence  NEUROLOGICAL: No headaches  SKIN: No itching,  no   rash  LYMPH Nodes: No enlarged glands  ENDOCRINE: No heat or cold intolerance  MUSCULOSKELETAL: No joint pain or swelling  PSYCHIATRIC: No depression, anxiety  HEME/LYMPH: No easy bruising, or bleeding gums  ALLERGY AND IMMUNOLOGIC: No hives or eczema	    Allergies    alfuzosin (Hives)  levofloxacin (Hives)  Myrbetriq (Hives)  tamsulosin (Hives)    Intolerances        CAPILLARY BLOOD GLUCOSE        I&O's Summary      Vital Signs Last 24 Hrs  T(C): 37.4 (25 Mar 2023 14:50), Max: 37.4 (25 Mar 2023 14:50)  T(F): 99.3 (25 Mar 2023 14:50), Max: 99.3 (25 Mar 2023 14:50)  HR: 112 (25 Mar 2023 14:50) (112 - 112)  BP: 123/68 (25 Mar 2023 14:50) (123/68 - 123/68)  BP(mean): 82 (25 Mar 2023 14:50) (82 - 82)  RR: 20 (25 Mar 2023 14:50) (20 - 20)  SpO2: 100% (25 Mar 2023 14:50) (100% - 100%)    Parameters below as of 25 Mar 2023 14:50  Patient On (Oxygen Delivery Method): nasal cannula  O2 Flow (L/min): 4      PHYSICAL EXAM:  Appearance: Normal	  HEENT:   Normal oral mucosa, PERRL, EOMI	  Lymphatic: No lymphadenopathy  Cardiovascular: Normal S1 S2, No JVD  Respiratory: Lungs clear to auscultation	  Psychiatry:    Mood & affect appropriate  Gastrointestinal:  Soft, Non-tender, + BS	  Skin: No rash, No ecchymoses	  Extremities: Normal range of motion    +  edema/  redness /  and left leg lymphedeema        MEDICATIONS  (STANDING):    MEDICATIONS  (PRN):    LABS:                        11.1   12.13 )-----------( 131      ( 25 Mar 2023 12:16 )             34.1     03-25    137  |  100  |  24<H>  ----------------------------<  122<H>  4.5   |  26  |  1.00    Ca    9.2      25 Mar 2023 12:16  Mg     1.9     03-25    TPro  7.5  /  Alb  3.9  /  TBili  1.0  /  DBili  x   /  AST  26  /  ALT  19  /  AlkPhos  111  03-25    PT/INR - ( 25 Mar 2023 12:16 )   PT: 15.7 sec;   INR: 1.35 ratio         PTT - ( 25 Mar 2023 12:16 )  PTT:31.0 sec            03-25 @ 12:16  4.0  25              Consultant(s) Notes Reviewed:      Care Discussed with Consultants/Other Providers:  Plan:

## 2023-03-25 NOTE — H&P ADULT - NSHPLABSRESULTS_GEN_ALL_CORE
< from: TTE with Doppler (w/Cont) (07.05.22 @ 09:42) >    Mitral Valve: Bioprosthetic mitral valve with normal  function. Mean gradient 5.2 mmHg at  78/min.  No mitral valve regurgitation seen.  Aortic Valve/Aorta: Calcified aortic valve with normal  opening. Mild aortic regurgitation.  Mild aortic root dilatation.  Left Atrium: Severely dilated left atrium.  Left Ventricle: Endocardial visualization enhanced with  intravenous injectionof Ultrasonic Enhancing Agent  (Definity).  Severe left ventricular enlargement.  Estimated ejection fraction 35%. Diffuse hypokinesis, with  inferior akinesis.  Right Heart: Mild right atrial enlargement.  Right ventricular enlargement with decreased right  ventricular systolic function.  A device wire is noted in the right heart.  Moderate-severe tricuspid regurgitation.  Normal pulmonic valve. Minimal pulmonic regurgitation.  Pericardium/Pleura: Normal pericardium with no pericardial  effusion.  Hemodynamic: Estimated PASP = 26 mmHg + estimated mean  right atrial pressure.  ------------------------------------------------------------------------  Conclusions:  Endocardial visualization enhanced with intravenous  injection of Ultrasonic Enhancing Agent (Definity).  Severe left ventricular enlargement.  Estimated ejection fraction 35%. Diffuse hypokinesis, with  inferior akinesis.  Bioprosthetic mitral valve with normal function.  Right ventricular enlargement with decreased right  ventricular systolic function.  A device wire is noted in the right heart.

## 2023-03-25 NOTE — PATIENT PROFILE ADULT - NSPRESCRALCFREQ_GEN_A_NUR
28 y/o male with PMH of GERD, h/o PUD, Cycling vomiting syndrome,  depression, insomnia, anxiety, polysubstance abuse, multiple suicidal attempts and seeking behavior per psych records admitted to  and ready for discharge but pt requesting to be seen by the doctor because he reports severe epigastric pain 10/10, sharp, non-radiating, constant, associated with nausea, vomiting. Patient saying that he sees the blood in the vomit about table spoon and vomiting 10 times per day since he is in the hospital. He denies any diarrhea, no stool for 1 week, unable to eat, decreased amount of urine.   Denies CP, dyspnea, cough, HA, other sx.    * Severe epigastric abdominal pain with h/o GERD and PUD- EGD w gastritis  * Nausea, hematemesis  * Cyclic vomiting syndrome  - med - surg  - regular diet  - IV fluids  - CT abd unremarkable  - BID PPI, carafate, antiemetics  - pain control  - GI consult appreciated    * Hypokalemia due to GI loss  - replace and monitor  - check Mg, Ph    * Dehydration  - improved    * Depression  - c/w SSRI  - fluoxetine and trazadone  - check EKG  - TSh wnl, check B12 level    * Anxiety  - c/w seroquel and clonazepam    * DVT proph - IPC
28yo male with anxiety disorder and gastritis  continue PPI and carafate  PO as tolerated   d/c planning  will no longer follow daily - please call with questions
2-4 times a month

## 2023-03-25 NOTE — H&P ADULT - HISTORY OF PRESENT ILLNESS
CHIEF COMPLAINT:Patient is a 77y old  Male who presents with a chief complaint of leg  swelling (25 Mar 2023 17:39)      HPI:  78 y/o male PMHx HTN, HLD, CAD s/p stent on ASA, A-fib, hypothyroid, CHF now presenting to the ED worsening SOB, DUNCAN, peripheral edema, scrotal edema and 20 pound unintentional weight gain over last month. Patient reported non compliance with diuretics as he had doctors appts to attend to and did not want to persistently have to void. Patient is experiencing difficulty voiding 2/2 scrotal edema. Denied CP, cough, fever, N/V/D,    PAST MEDICAL & SURGICAL HISTORY:  Afib  not on anticoagulation  CAD (coronary artery disease)  Varicose vein of leg  Hypothyroid  Pacemaker  H/O fracture of hip  H/O asbestosis  HTN (hypertension)  Pacemaker  [employer] - Model RVDR01 1/10/201  Stented coronary artery  drug eluding stent 5/2007  H/O detached retina repair  1952  S/P cataract surgery  History of hip surgery  left hip ORIF  H/O varicose vein stripping  1993 left leg  History of left knee replacement  2013 - multiple infections post op requiring reoperation in 2015, 2017, 2019)  H/O foot surgery  for infection of right foot 2019  H/O inguinal hernia repair  right 1993      MEDICATIONS  (STANDING):  apixaban 5 milliGRAM(s) Oral two times a day  aspirin  chewable 81 milliGRAM(s) Oral daily  atorvastatin 40 milliGRAM(s) Oral at bedtime  buMETAnide Injectable 1 milliGRAM(s) IV Push two times a day  ceFAZolin   IVPB 1000 milliGRAM(s) IV Intermittent every 8 hours  levothyroxine 137 MICROGram(s) Oral daily  metoprolol succinate  milliGRAM(s) Oral daily    MEDICATIONS  (PRN):      FAMILY HISTORY:  FH: skin cancer (Father)        SOCIAL HISTORY:    [ ] Non-smoker  [ ] Smoker  [ ] Alcohol    Allergies    alfuzosin (Hives)  levofloxacin (Hives)  Myrbetriq (Hives)  tamsulosin (Hives)    Intolerances    	    REVIEW OF SYSTEMS:  CONSTITUTIONAL: No fever, weight loss, or fatigue  EYES: No eye pain, visual disturbances, or discharge  ENT:  No difficulty hearing, tinnitus, vertigo; No sinus or throat pain  NECK: No pain or stiffness  RESPIRATORY: No cough, wheezing, chills or hemoptysis; + Shortness of Breath  CARDIOVASCULAR: No chest pain, palpitations, passing out, dizziness, or leg swelling  GASTROINTESTINAL: No abdominal or epigastric pain. No nausea, vomiting, or hematemesis; No diarrhea or constipation. No melena or hematochezia.  GENITOURINARY: No dysuria, frequency, hematuria, or incontinence  NEUROLOGICAL: No headaches, memory loss, loss of strength, numbness, or tremors  SKIN: No itching, burning, rashes, or lesions   LYMPH Nodes: No enlarged glands  ENDOCRINE: No heat or cold intolerance; No hair loss  MUSCULOSKELETAL: No joint pain or swelling; No muscle, back, or extremity pain  PSYCHIATRIC: No depression, anxiety, mood swings, or difficulty sleeping  HEME/LYMPH: No easy bruising, or bleeding gums  ALLERGY AND IMMUNOLOGIC: No hives or eczema	    [ ] All others negative	  [ ] Unable to obtain    PHYSICAL EXAM:  T(C): 37.4 (03-25-23 @ 14:50), Max: 37.4 (03-25-23 @ 14:50)  HR: 112 (03-25-23 @ 14:50) (112 - 112)  BP: 123/68 (03-25-23 @ 14:50) (123/68 - 123/68)  RR: 20 (03-25-23 @ 14:50) (20 - 20)  SpO2: 100% (03-25-23 @ 14:50) (100% - 100%)  Wt(kg): --  I&O's Summary      Appearance: Normal	  HEENT:   Normal oral mucosa, PERRL, EOMI	  Lymphatic: No lymphadenopathy  Cardiovascular: Normal S1 S2, No JVD, No murmurs, + edema  Respiratory: Lungs clear to auscultation	  Psychiatry: A & O x 3, Mood & affect appropriate  Gastrointestinal:  Soft, Non-tender, + BS	  Skin: No rashes, No ecchymoses, No cyanosis	  Neurologic: Non-focal  Extremities: Normal range of motion, No clubbing, cyanosis , + edema  Vascular: Peripheral pulses palpable 2+ bilaterally    TELEMETRY: 	    ECG:  	  RADIOLOGY:  OTHER: 	  	  LABS:	 	    CARDIAC MARKERS:                              11.1   12.13 )-----------( 131      ( 25 Mar 2023 12:16 )             34.1     03-25    137  |  100  |  24<H>  ----------------------------<  122<H>  4.5   |  26  |  1.00    Ca    9.2      25 Mar 2023 12:16  Mg     1.9     03-25    TPro  7.5  /  Alb  3.9  /  TBili  1.0  /  DBili  x   /  AST  26  /  ALT  19  /  AlkPhos  111  03-25    proBNP:   Lipid Profile:   HgA1c:   TSH:   PT/INR - ( 25 Mar 2023 12:16 )   PT: 15.7 sec;   INR: 1.35 ratio         PTT - ( 25 Mar 2023 12:16 )  PTT:31.0 sec    PREVIOUS DIAGNOSTIC TESTING:      < from: 12 Lead ECG (06.28.22 @ 17:25) >  Diagnosis Line ATRIAL FIBRILLATION WITH PREMATURE VENTRICULAR OR ABERRANTLY CONDUCTED COMPLEXES  NONSPECIFIC T WAVE ABNORMALITY  ABNORMAL ECG  WHEN COMPARED WITH ECG OF 01-SEP-2021 00:31,  new afib noted      < from: Xray Chest 1 View AP/PA (03.25.23 @ 12:40) >  Lungs: Pulmonary vascular congestion. Right midlung scar.  Pleura: No pleural effusion or pneumothorax.  Heart And Mediastinum: Heart size is within normal limits. Left chest   wall dual-chamber pacemaker. Mediastinal wires and surgical clips. Left   atrial appendage clip. Mitral valve replacement.  Skeleton: There is no acute osseus abnormality.    IMPRESSION:  Pulmonary vascular congestion. Right midlung scar.

## 2023-03-25 NOTE — PATIENT PROFILE ADULT - FALL HARM RISK - HARM RISK INTERVENTIONS

## 2023-03-25 NOTE — ED PROVIDER NOTE - PROGRESS NOTE DETAILS
I was not involved in the care of this patient and am only entering information to back log for downtime. Please see paper chart for isolation and medical management details, as the mandatory fields in the disposition section may not be accurate. - Raúl Rider PA-C

## 2023-03-25 NOTE — PATIENT PROFILE ADULT - FUNCTIONAL ASSESSMENT - BASIC MOBILITY 6.
2-calculated by average/Not able to assess (calculate score using [facility] averaging method)

## 2023-03-25 NOTE — CONSULT NOTE ADULT - ASSESSMENT
77  year old male    h/o  CAD s/p stent , asa.      A-fib/ PPM, , hypothyroid, and total left knee replacement    prior  PPM  interrogation  with  [facility]/   s/p  brief   bursts  of  afib/ , on prior visit   Ct chest, retrosternal hematoma.  mod left pl effusion    was  on bumex/  aldactone          admitted with    pedal  edema/ scrotal edema       component  of  cellulitia  and  from  c/c  diastolic  chf, related  to non compliance  with his  lasix      and  from  c/c   lymphedema,  carla left  leg     diuretcs  and iv  ancef   *  CAD/  c/c  AFIb ,  PPM,/ HTN   stent in 2007   *   h/o  AFIB,  was  not  on  a/c    * Anemia, , hb stable, had  been seen by  gi  eval dr joann sanchez in past       *  CAD,     cath, with 2 vessel disease,  s/p  CABG and  MVR  surg d r marni   *  h/o  Afib on    eliquis     c/c leg redness/  4 +  edema, on keflex, has  improved      echo,  on 7/2022,  ef  35/ new/ severe  TR      cath,   was non  obstructive          f/p wih  dr mikayla dick         rad< from: TTE with Doppler (w/Cont) (07.05.22 @ 09:42) >  Conclusions:  Endocardial visualization enhanced with intravenous  injection of Ultrasonic Enhancing Agent (Definity).  Severe left ventricular enlargement.  Estimated ejection fraction 35%. Diffuse hypokinesis, with  inferior akinesis.  Bioprosthetic mitral valve with normal function.  Right ventricular enlargement with decreased right  ventricular systolic function.  A device wire is noted in the right heart.  ------------------------------------------------------------------------  Confirmed on  7/5/2022 - 12:16:10 by Kristian    < end of copied text >                                  77  year old male    h/o  CAD s/p stent , asa.      A-fib/ PPM, , hypothyroid, and total left knee replacement    prior  PPM  interrogation  with  [facility]/   s/p  brief   bursts  of  afib/ , on prior visit   Ct chest, retrosternal hematoma.  mod left pl effusion    was  on bumex/  aldactone          admitted with    pedal  edema/ scrotal edema       component  of  cellulitis   and  from  c/c  diastolic  chf,       and  from  c/c   lymphedema,  carla left  leg     diuretics   and iv  ancef   *   h/o    c/c AFIB,   has  PPM         on eliquis     *    Anemia, , hb stable, had  been seen by  gi  eval dr joann sanchez in past     *  CAD,     cath, with 2 vessel disease,  s/p  CABG and  MVR  surg d r marni   *    h/o  Afib on    eliquis         c/c leg redness/  4 +  edema, on keflex, has  improved    *     echo,  on 7/2022,  ef  35/ new/ severe  TR          cath,   was non  obstructive    *  on synthroid       on asa/  torpol/  eliquis          f/p wih  dr mikayla dick         rad< from: TTE with Doppler (w/Cont) (07.05.22 @ 09:42) >  Conclusions:  Endocardial visualization enhanced with intravenous  injection of Ultrasonic Enhancing Agent (Definity).  Severe left ventricular enlargement.  Estimated ejection fraction 35%. Diffuse hypokinesis, with  inferior akinesis.  Bioprosthetic mitral valve with normal function.  Right ventricular enlargement with decreased right  ventricular systolic function.  A device wire is noted in the right heart.  ------------------------------------------------------------------------  Confirmed on  7/5/2022 - 12:16:10 by Kristian    < end of copied text >

## 2023-03-25 NOTE — H&P ADULT - ASSESSMENT
77y m pmh BPH, Chronic venous insufficiency, HLD Hypothyroidism, S/P CABG x 2, S/P mitral valve replacement, SP Status post angioplasty, Sp TKR rt, Anasarca Pt comes to ed c/o shortness of breath for past month w progressive worsening of anasarca w swelling of scrotum and diff urinating, Has been noncompliant on diuretics for past two weeks w 20lb weight gain over past month. No fever and chills, sputum, nvdc, cp. PE Adult male lying stretcher w maked swelling to josh lower extr, w pitting edema,  chest  scant josh crackles w slight wheeze and prolonged exp phase  chf acute on chronic sec to RV dysfunction  start on lasix drip, strict i/o  fu lytes  tele, AFib check HR  tele  a,fib , continue AC  will adjust cardiac meds

## 2023-03-26 LAB
ANION GAP SERPL CALC-SCNC: 10 MMOL/L — SIGNIFICANT CHANGE UP (ref 5–17)
ANION GAP SERPL CALC-SCNC: 13 MMOL/L — SIGNIFICANT CHANGE UP (ref 5–17)
BUN SERPL-MCNC: 24 MG/DL — HIGH (ref 7–23)
BUN SERPL-MCNC: 32 MG/DL — HIGH (ref 7–23)
CALCIUM SERPL-MCNC: 8.4 MG/DL — SIGNIFICANT CHANGE UP (ref 8.4–10.5)
CALCIUM SERPL-MCNC: 8.7 MG/DL — SIGNIFICANT CHANGE UP (ref 8.4–10.5)
CHLORIDE SERPL-SCNC: 97 MMOL/L — SIGNIFICANT CHANGE UP (ref 96–108)
CHLORIDE SERPL-SCNC: 99 MMOL/L — SIGNIFICANT CHANGE UP (ref 96–108)
CO2 SERPL-SCNC: 24 MMOL/L — SIGNIFICANT CHANGE UP (ref 22–31)
CO2 SERPL-SCNC: 28 MMOL/L — SIGNIFICANT CHANGE UP (ref 22–31)
CREAT SERPL-MCNC: 1.18 MG/DL — SIGNIFICANT CHANGE UP (ref 0.5–1.3)
CREAT SERPL-MCNC: 1.38 MG/DL — HIGH (ref 0.5–1.3)
EGFR: 53 ML/MIN/1.73M2 — LOW
EGFR: 64 ML/MIN/1.73M2 — SIGNIFICANT CHANGE UP
GLUCOSE SERPL-MCNC: 131 MG/DL — HIGH (ref 70–99)
GLUCOSE SERPL-MCNC: 98 MG/DL — SIGNIFICANT CHANGE UP (ref 70–99)
MAGNESIUM SERPL-MCNC: 1.7 MG/DL — SIGNIFICANT CHANGE UP (ref 1.6–2.6)
MAGNESIUM SERPL-MCNC: 1.7 MG/DL — SIGNIFICANT CHANGE UP (ref 1.6–2.6)
POTASSIUM SERPL-MCNC: 3 MMOL/L — LOW (ref 3.5–5.3)
POTASSIUM SERPL-MCNC: 3.4 MMOL/L — LOW (ref 3.5–5.3)
POTASSIUM SERPL-SCNC: 3 MMOL/L — LOW (ref 3.5–5.3)
POTASSIUM SERPL-SCNC: 3.4 MMOL/L — LOW (ref 3.5–5.3)
SODIUM SERPL-SCNC: 135 MMOL/L — SIGNIFICANT CHANGE UP (ref 135–145)
SODIUM SERPL-SCNC: 136 MMOL/L — SIGNIFICANT CHANGE UP (ref 135–145)

## 2023-03-26 RX ORDER — ACETAMINOPHEN 500 MG
650 TABLET ORAL ONCE
Refills: 0 | Status: COMPLETED | OUTPATIENT
Start: 2023-03-26 | End: 2023-03-26

## 2023-03-26 RX ORDER — MAGNESIUM SULFATE 500 MG/ML
1 VIAL (ML) INJECTION ONCE
Refills: 0 | Status: COMPLETED | OUTPATIENT
Start: 2023-03-26 | End: 2023-03-26

## 2023-03-26 RX ORDER — SPIRONOLACTONE 25 MG/1
25 TABLET, FILM COATED ORAL
Refills: 0 | Status: DISCONTINUED | OUTPATIENT
Start: 2023-03-26 | End: 2023-04-03

## 2023-03-26 RX ORDER — POTASSIUM CHLORIDE 20 MEQ
10 PACKET (EA) ORAL
Refills: 0 | Status: COMPLETED | OUTPATIENT
Start: 2023-03-26 | End: 2023-03-26

## 2023-03-26 RX ORDER — FUROSEMIDE 40 MG
5 TABLET ORAL
Qty: 500 | Refills: 0 | Status: DISCONTINUED | OUTPATIENT
Start: 2023-03-26 | End: 2023-03-27

## 2023-03-26 RX ORDER — POTASSIUM CHLORIDE 20 MEQ
40 PACKET (EA) ORAL ONCE
Refills: 0 | Status: COMPLETED | OUTPATIENT
Start: 2023-03-26 | End: 2023-03-26

## 2023-03-26 RX ADMIN — ATORVASTATIN CALCIUM 40 MILLIGRAM(S): 80 TABLET, FILM COATED ORAL at 21:31

## 2023-03-26 RX ADMIN — Medication 40 MILLIEQUIVALENT(S): at 17:29

## 2023-03-26 RX ADMIN — APIXABAN 5 MILLIGRAM(S): 2.5 TABLET, FILM COATED ORAL at 11:49

## 2023-03-26 RX ADMIN — Medication 650 MILLIGRAM(S): at 04:58

## 2023-03-26 RX ADMIN — Medication 100 MILLIGRAM(S): at 05:19

## 2023-03-26 RX ADMIN — Medication 137 MICROGRAM(S): at 05:45

## 2023-03-26 RX ADMIN — Medication 650 MILLIGRAM(S): at 05:25

## 2023-03-26 RX ADMIN — Medication 100 MILLIGRAM(S): at 21:32

## 2023-03-26 RX ADMIN — Medication 100 MILLIGRAM(S): at 17:19

## 2023-03-26 RX ADMIN — Medication 100 MILLIEQUIVALENT(S): at 10:45

## 2023-03-26 RX ADMIN — Medication 40 MILLIEQUIVALENT(S): at 21:31

## 2023-03-26 RX ADMIN — Medication 100 GRAM(S): at 21:32

## 2023-03-26 RX ADMIN — Medication 100 MILLIEQUIVALENT(S): at 11:59

## 2023-03-26 RX ADMIN — APIXABAN 5 MILLIGRAM(S): 2.5 TABLET, FILM COATED ORAL at 17:31

## 2023-03-26 RX ADMIN — Medication 81 MILLIGRAM(S): at 17:24

## 2023-03-26 RX ADMIN — SPIRONOLACTONE 25 MILLIGRAM(S): 25 TABLET, FILM COATED ORAL at 17:31

## 2023-03-26 RX ADMIN — Medication 2.5 MG/HR: at 21:33

## 2023-03-26 RX ADMIN — Medication 100 MILLIGRAM(S): at 11:50

## 2023-03-26 RX ADMIN — Medication 100 MILLIEQUIVALENT(S): at 09:28

## 2023-03-26 NOTE — PROGRESS NOTE ADULT - SUBJECTIVE AND OBJECTIVE BOX
afberile  REVIEW OF SYSTEMS:  CONSTITUTIONAL: No fever,  no  weight loss  ENT:  No  tinnitus,   no   vertigo  NECK: No pain or stiffness  RESPIRATORY: No cough, wheezing, chills or hemoptysis;    No Shortness of Breath  CARDIOVASCULAR: No chest pain, palpitations, dizziness  GASTROINTESTINAL: No abdominal or epigastric pain. No nausea, vomiting, or hematemesis; No diarrhea  No melena or hematochezia.  GENITOURINARY: No dysuria, frequency, hematuria, or incontinence  NEUROLOGICAL: No headaches  SKIN: No itching,  no   rash  LYMPH Nodes: No enlarged glands  ENDOCRINE: No heat or cold intolerance  MUSCULOSKELETAL: No joint pain or swelling  PSYCHIATRIC: No depression, anxiety  HEME/LYMPH: No easy bruising, or bleeding gums  ALLERGY AND IMMUNOLOGIC: No hives or eczema	    MEDICATIONS  (STANDING):  apixaban 5 milliGRAM(s) Oral two times a day  aspirin  chewable 81 milliGRAM(s) Oral daily  atorvastatin 40 milliGRAM(s) Oral at bedtime  ceFAZolin   IVPB 1000 milliGRAM(s) IV Intermittent every 8 hours  furosemide Infusion 10 mG/Hr (5 mL/Hr) IV Continuous <Continuous>  levothyroxine 137 MICROGram(s) Oral daily  magnesium sulfate  IVPB 1 Gram(s) IV Intermittent once  metoprolol succinate  milliGRAM(s) Oral daily  potassium chloride    Tablet ER 40 milliEquivalent(s) Oral once  potassium chloride  10 mEq/100 mL IVPB 10 milliEquivalent(s) IV Intermittent every 1 hour    MEDICATIONS  (PRN):      Vital Signs Last 24 Hrs  T(C): 37.7 (26 Mar 2023 05:17), Max: 37.7 (26 Mar 2023 05:17)  T(F): 99.9 (26 Mar 2023 05:17), Max: 99.9 (26 Mar 2023 05:17)  HR: 100 (26 Mar 2023 05:17) (85 - 112)  BP: 98/59 (26 Mar 2023 05:17) (98/59 - 151/72)  BP(mean): 82 (25 Mar 2023 14:50) (82 - 82)  RR: 18 (26 Mar 2023 05:17) (18 - 20)  SpO2: 96% (26 Mar 2023 05:17) (96% - 100%)    Parameters below as of 26 Mar 2023 05:17  Patient On (Oxygen Delivery Method): nasal cannula      CAPILLARY BLOOD GLUCOSE        I&O's Summary    25 Mar 2023 07:01  -  26 Mar 2023 07:00  --------------------------------------------------------  IN: 0 mL / OUT: 450 mL / NET: -450 mL          Appearance: Normal	  HEENT:   Normal oral mucosa, PERRL, EOMI	  Lymphatic: No lymphadenopathy  Cardiovascular: Normal S1 S2, No JVD  Respiratory: Lungs clear to auscultation	  Psychiatry: A & O x 3, Mood & affect appropriate  Gastrointestinal:  Soft, Non-tender, + BS	  Skin: No rash, No ecchymoses	  Extremities: Normal range of motion  b/l  edema  LABS:                        11.1   12.13 )-----------( 131      ( 25 Mar 2023 12:16 )             34.1     03-26    136  |  99  |  24<H>  ----------------------------<  98  3.0<L>   |  24  |  1.18    Ca    8.4      26 Mar 2023 06:49  Mg     1.7     03-26    TPro  7.5  /  Alb  3.9  /  TBili  1.0  /  DBili  x   /  AST  26  /  ALT  19  /  AlkPhos  111  03-25    PT/INR - ( 25 Mar 2023 12:16 )   PT: 15.7 sec;   INR: 1.35 ratio         PTT - ( 25 Mar 2023 12:16 )  PTT:31.0 sec            03-25 @ 12:16  4.0  25              Consultant(s) Notes Reviewed:      Care Discussed with Consultants/Other Providers:

## 2023-03-26 NOTE — PROVIDER CONTACT NOTE (OTHER) - ASSESSMENT
Pt is asymptomatic, no c/o pain, /61, 98% on 4lNC, temp 99.3F.  BP soft in the AM 98/59, no c/o dizziness, sob, or n/v.

## 2023-03-26 NOTE — PROGRESS NOTE ADULT - ASSESSMENT
77  year old male    h/o  CAD s/p stent , asa.      A-fib/ PPM, , hypothyroid, and total left knee replacement    prior  PPM  interrogation  with  [facility]/   s/p  brief   bursts  of  afib/ , on prior visit   Ct chest, retrosternal hematoma.  mod left pl effusion    was  on bumex/  aldactone        *   admitted with    wprsening   pedal  edema      component  of  cellulitis   and  from  c/c  diastolic  chf,/   pt  admits to  being  non complaint   with  his meds       and  from  c/c   lymphedema,  carla left  leg      diuretics   and iv  ancef   *   h/o    c/c AFIB,   has  PPM         on eliquis     *    Anemia, , hb stable, had  been seen by  gi  eval dr joann sanchez in past     *  CAD,     cath, with 2 vessel disease,  s/p  CABG and  MVR  surg d r marni   *    h/o  Afib on    eliquis         c/c leg redness/  4 +  edema, on keflex, has  improved    *     echo,  on 7/2022,  ef  35/ new/ severe  TR          cath,   was non  obstructive    *  on synthroid       on asa/  torpol/  eliquis    labs  pending         f/p wih  dr mikayla dick         rad< from: TTE with Doppler (w/Cont) (07.05.22 @ 09:42) >  Conclusions:  Endocardial visualization enhanced with intravenous  injection of Ultrasonic Enhancing Agent (Definity).  Severe left ventricular enlargement.  Estimated ejection fraction 35%. Diffuse hypokinesis, with  inferior akinesis.  Bioprosthetic mitral valve with normal function.  Right ventricular enlargement with decreased right  ventricular systolic function.  A device wire is noted in the right heart.  ------------------------------------------------------------------------  Confirmed on  7/5/2022 - 12:16:10 by Kristian    < end of copied text >

## 2023-03-26 NOTE — PROGRESS NOTE ADULT - SUBJECTIVE AND OBJECTIVE BOX
CARDIOLOGY     PROGRESS  NOTE   ________________________________________________    CHIEF COMPLAINT:Patient is a 77y old  Male who presents with a chief complaint of leg  swelling (25 Mar 2023 17:39)  doing better  	  REVIEW OF SYSTEMS:  CONSTITUTIONAL: No fever, weight loss, or fatigue  EYES: No eye pain, visual disturbances, or discharge  ENT:  No difficulty hearing, tinnitus, vertigo; No sinus or throat pain  NECK: No pain or stiffness  RESPIRATORY: No cough, wheezing, chills or hemoptysis; decrease Shortness of Breath  CARDIOVASCULAR: No chest pain, palpitations, passing out, dizziness, +leg swelling  GASTROINTESTINAL: No abdominal or epigastric pain. No nausea, vomiting, or hematemesis; No diarrhea or constipation. No melena or hematochezia.  GENITOURINARY: No dysuria, frequency, hematuria, or incontinence  NEUROLOGICAL: No headaches, memory loss, loss of strength, numbness, or tremors  SKIN: No itching, burning, rashes, or lesions   LYMPH Nodes: No enlarged glands  ENDOCRINE: No heat or cold intolerance; No hair loss  MUSCULOSKELETAL: No joint pain or swelling; No muscle, back, or extremity pain  PSYCHIATRIC: No depression, anxiety, mood swings, or difficulty sleeping  HEME/LYMPH: No easy bruising, or bleeding gums  ALLERGY AND IMMUNOLOGIC: No hives or eczema	    [ ] All others negative	  [ ] Unable to obtain    PHYSICAL EXAM:  T(C): 37.7 (03-26-23 @ 05:17), Max: 37.7 (03-26-23 @ 05:17)  HR: 100 (03-26-23 @ 05:17) (85 - 112)  BP: 98/59 (03-26-23 @ 05:17) (98/59 - 151/72)  RR: 18 (03-26-23 @ 05:17) (18 - 20)  SpO2: 96% (03-26-23 @ 05:17) (96% - 100%)  Wt(kg): --  I&O's Summary    25 Mar 2023 07:01  -  26 Mar 2023 07:00  --------------------------------------------------------  IN: 0 mL / OUT: 450 mL / NET: -450 mL        Appearance: Normal/ anasarca	  HEENT:   Normal oral mucosa, PERRL, EOMI	  Lymphatic: No lymphadenopathy  Cardiovascular: Normal S1 S2, No JVD, No murmurs, +edema  Respiratory: Lungs clear to auscultation	  Psychiatry: A & O x 3, Mood & affect appropriate  Gastrointestinal:  Soft, Non-tender, + BS	  Skin: No rashes, No ecchymoses, No cyanosis	  Neurologic: Non-focal  Extremities: Normal range of motion, No clubbing, cyanosis , + edema  Vascular: Peripheral pulses palpable 2+ bilaterally    MEDICATIONS  (STANDING):  apixaban 5 milliGRAM(s) Oral two times a day  aspirin  chewable 81 milliGRAM(s) Oral daily  atorvastatin 40 milliGRAM(s) Oral at bedtime  ceFAZolin   IVPB 1000 milliGRAM(s) IV Intermittent every 8 hours  furosemide Infusion 10 mG/Hr (5 mL/Hr) IV Continuous <Continuous>  levothyroxine 137 MICROGram(s) Oral daily  magnesium sulfate  IVPB 1 Gram(s) IV Intermittent once  metoprolol succinate  milliGRAM(s) Oral daily  potassium chloride    Tablet ER 40 milliEquivalent(s) Oral once  potassium chloride  10 mEq/100 mL IVPB 10 milliEquivalent(s) IV Intermittent every 1 hour      TELEMETRY: 	    ECG:  	  RADIOLOGY:  OTHER: 	  	  LABS:	 	    CARDIAC MARKERS:                                11.1   12.13 )-----------( 131      ( 25 Mar 2023 12:16 )             34.1     03-26    136  |  99  |  24<H>  ----------------------------<  98  3.0<L>   |  24  |  1.18    Ca    8.4      26 Mar 2023 06:49  Mg     1.7     03-26    TPro  7.5  /  Alb  3.9  /  TBili  1.0  /  DBili  x   /  AST  26  /  ALT  19  /  AlkPhos  111  03-25    proBNP:   Lipid Profile:   HgA1c:   TSH:   PT/INR - ( 25 Mar 2023 12:16 )   PT: 15.7 sec;   INR: 1.35 ratio         PTT - ( 25 Mar 2023 12:16 )  PTT:31.0 sec  < from: Xray Chest 1 View AP/PA (03.25.23 @ 12:40) >  Lungs: Pulmonary vascular congestion. Right midlung scar.  Pleura: No pleural effusion or pneumothorax.  Heart And Mediastinum: Heart size is within normal limits. Left chest   wall dual-chamber pacemaker. Mediastinal wires and surgical clips. Left   atrial appendage clip. Mitral valve replacement.  Skeleton: There is no acute osseus abnormality.    IMPRESSION:  Pulmonary vascular congestion. Right midlung scar.        Assessment and plan  ---------------------------  77y m pmh BPH, Chronic venous insufficiency, HLD Hypothyroidism, S/P CABG x 2, S/P mitral valve replacement, SP Status post angioplasty, Sp TKR rt, Anasarca Pt comes to ed c/o shortness of breath for past month w progressive worsening of anasarca w swelling of scrotum and diff urinating, Has been noncompliant on diuretics for past two weeks w 20lb weight gain over past month. No fever and chills, sputum, nvdc, cp. PE Adult male lying stretcher w maked swelling to josh lower extr, w pitting edema,  chest  scant josh crackles w slight wheeze and prolonged exp phase  chf acute on chronic sec to RV dysfunction  start on lasix drip, strict i/o  fu lytes  tele, AFib check HR  a,fib , continue AC  will adjust cardiac meds  continue lasix drip decrease to 5 fu i/o  add aldactone 25 mg bid

## 2023-03-27 LAB
ANION GAP SERPL CALC-SCNC: 11 MMOL/L — SIGNIFICANT CHANGE UP (ref 5–17)
ANION GAP SERPL CALC-SCNC: 12 MMOL/L — SIGNIFICANT CHANGE UP (ref 5–17)
BUN SERPL-MCNC: 33 MG/DL — HIGH (ref 7–23)
BUN SERPL-MCNC: 40 MG/DL — HIGH (ref 7–23)
CALCIUM SERPL-MCNC: 8.9 MG/DL — SIGNIFICANT CHANGE UP (ref 8.4–10.5)
CALCIUM SERPL-MCNC: 9 MG/DL — SIGNIFICANT CHANGE UP (ref 8.4–10.5)
CHLORIDE SERPL-SCNC: 96 MMOL/L — SIGNIFICANT CHANGE UP (ref 96–108)
CHLORIDE SERPL-SCNC: 99 MMOL/L — SIGNIFICANT CHANGE UP (ref 96–108)
CO2 SERPL-SCNC: 25 MMOL/L — SIGNIFICANT CHANGE UP (ref 22–31)
CO2 SERPL-SCNC: 29 MMOL/L — SIGNIFICANT CHANGE UP (ref 22–31)
CREAT SERPL-MCNC: 1.34 MG/DL — HIGH (ref 0.5–1.3)
CREAT SERPL-MCNC: 1.38 MG/DL — HIGH (ref 0.5–1.3)
EGFR: 53 ML/MIN/1.73M2 — LOW
EGFR: 55 ML/MIN/1.73M2 — LOW
GLUCOSE SERPL-MCNC: 156 MG/DL — HIGH (ref 70–99)
GLUCOSE SERPL-MCNC: 93 MG/DL — SIGNIFICANT CHANGE UP (ref 70–99)
HCT VFR BLD CALC: 31.6 % — LOW (ref 39–50)
HGB BLD-MCNC: 10.1 G/DL — LOW (ref 13–17)
MCHC RBC-ENTMCNC: 30.5 PG — SIGNIFICANT CHANGE UP (ref 27–34)
MCHC RBC-ENTMCNC: 32 GM/DL — SIGNIFICANT CHANGE UP (ref 32–36)
MCV RBC AUTO: 95.5 FL — SIGNIFICANT CHANGE UP (ref 80–100)
NRBC # BLD: 0 /100 WBCS — SIGNIFICANT CHANGE UP (ref 0–0)
PLATELET # BLD AUTO: 100 K/UL — LOW (ref 150–400)
POTASSIUM SERPL-MCNC: 3.8 MMOL/L — SIGNIFICANT CHANGE UP (ref 3.5–5.3)
POTASSIUM SERPL-MCNC: 4 MMOL/L — SIGNIFICANT CHANGE UP (ref 3.5–5.3)
POTASSIUM SERPL-SCNC: 3.8 MMOL/L — SIGNIFICANT CHANGE UP (ref 3.5–5.3)
POTASSIUM SERPL-SCNC: 4 MMOL/L — SIGNIFICANT CHANGE UP (ref 3.5–5.3)
RBC # BLD: 3.31 M/UL — LOW (ref 4.2–5.8)
RBC # FLD: 14.3 % — SIGNIFICANT CHANGE UP (ref 10.3–14.5)
SODIUM SERPL-SCNC: 136 MMOL/L — SIGNIFICANT CHANGE UP (ref 135–145)
SODIUM SERPL-SCNC: 136 MMOL/L — SIGNIFICANT CHANGE UP (ref 135–145)
WBC # BLD: 9.83 K/UL — SIGNIFICANT CHANGE UP (ref 3.8–10.5)
WBC # FLD AUTO: 9.83 K/UL — SIGNIFICANT CHANGE UP (ref 3.8–10.5)

## 2023-03-27 RX ORDER — FUROSEMIDE 40 MG
7.5 TABLET ORAL
Qty: 500 | Refills: 0 | Status: DISCONTINUED | OUTPATIENT
Start: 2023-03-27 | End: 2023-03-30

## 2023-03-27 RX ADMIN — Medication 100 MILLIGRAM(S): at 05:21

## 2023-03-27 RX ADMIN — Medication 81 MILLIGRAM(S): at 11:56

## 2023-03-27 RX ADMIN — SPIRONOLACTONE 25 MILLIGRAM(S): 25 TABLET, FILM COATED ORAL at 17:45

## 2023-03-27 RX ADMIN — APIXABAN 5 MILLIGRAM(S): 2.5 TABLET, FILM COATED ORAL at 05:21

## 2023-03-27 RX ADMIN — Medication 137 MICROGRAM(S): at 05:21

## 2023-03-27 RX ADMIN — ATORVASTATIN CALCIUM 40 MILLIGRAM(S): 80 TABLET, FILM COATED ORAL at 22:20

## 2023-03-27 RX ADMIN — Medication 3.75 MG/HR: at 11:54

## 2023-03-27 RX ADMIN — Medication 100 MILLIGRAM(S): at 13:51

## 2023-03-27 RX ADMIN — APIXABAN 5 MILLIGRAM(S): 2.5 TABLET, FILM COATED ORAL at 17:45

## 2023-03-27 RX ADMIN — SPIRONOLACTONE 25 MILLIGRAM(S): 25 TABLET, FILM COATED ORAL at 05:21

## 2023-03-27 RX ADMIN — Medication 100 MILLIGRAM(S): at 22:19

## 2023-03-27 NOTE — PROGRESS NOTE ADULT - SUBJECTIVE AND OBJECTIVE BOX
afberile    REVIEW OF SYSTEMS:  CONSTITUTIONAL: No fever,  no  weight loss  ENT:  No  tinnitus,   no   vertigo  NECK: No pain or stiffness  RESPIRATORY: No cough, wheezing, chills or hemoptysis;    No Shortness of Breath  CARDIOVASCULAR: No chest pain, palpitations, dizziness  GASTROINTESTINAL: No abdominal or epigastric pain. No nausea, vomiting, or hematemesis; No diarrhea  No melena or hematochezia.  GENITOURINARY: No dysuria, frequency, hematuria, or incontinence  NEUROLOGICAL: No headaches  SKIN: No itching,  no   rash  LYMPH Nodes: No enlarged glands  ENDOCRINE: No heat or cold intolerance  MUSCULOSKELETAL: No joint pain or swelling  PSYCHIATRIC: No depression, anxiety  HEME/LYMPH: No easy bruising, or bleeding gums  ALLERGY AND IMMUNOLOGIC: No hives or eczema	    MEDICATIONS  (STANDING):  apixaban 5 milliGRAM(s) Oral two times a day  aspirin  chewable 81 milliGRAM(s) Oral daily  atorvastatin 40 milliGRAM(s) Oral at bedtime  ceFAZolin   IVPB 1000 milliGRAM(s) IV Intermittent every 8 hours  furosemide Infusion 5 mG/Hr (2.5 mL/Hr) IV Continuous <Continuous>  levothyroxine 137 MICROGram(s) Oral daily  metoprolol succinate  milliGRAM(s) Oral daily  spironolactone 25 milliGRAM(s) Oral two times a day    MEDICATIONS  (PRN):      Vital Signs Last 24 Hrs  T(C): 36.9 (27 Mar 2023 04:41), Max: 37.5 (26 Mar 2023 20:37)  T(F): 98.5 (27 Mar 2023 04:41), Max: 99.5 (26 Mar 2023 20:37)  HR: 95 (27 Mar 2023 04:41) (86 - 114)  BP: 110/62 (27 Mar 2023 04:41) (92/48 - 110/62)  BP(mean): --  RR: 18 (27 Mar 2023 04:41) (18 - 18)  SpO2: 98% (27 Mar 2023 04:41) (95% - 98%)    Parameters below as of 27 Mar 2023 04:41  Patient On (Oxygen Delivery Method): nasal cannula      CAPILLARY BLOOD GLUCOSE        I&O's Summary    26 Mar 2023 07:01  -  27 Mar 2023 07:00  --------------------------------------------------------  IN: 735 mL / OUT: 2150 mL / NET: -1415 mL          Appearance: Normal	  HEENT:   Normal oral mucosa, PERRL, EOMI	  Lymphatic: No lymphadenopathy  Cardiovascular: Normal S1 S2, No JVD  Respiratory: Lungs clear to auscultation	  Psychiatry: A & O x 3, Mood & affect appropriate  Gastrointestinal:  Soft, Non-tender, + BS	  Skin: No rash, No ecchymoses	  Extremities: Normal range of motion  b/l  edema. less    LABS:                        11.1   12.13 )-----------( 131      ( 25 Mar 2023 12:16 )             34.1     03-26    135  |  97  |  32<H>  ----------------------------<  131<H>  3.4<L>   |  28  |  1.38<H>    Ca    8.7      26 Mar 2023 20:43  Mg     1.7     03-26    TPro  7.5  /  Alb  3.9  /  TBili  1.0  /  DBili  x   /  AST  26  /  ALT  19  /  AlkPhos  111  03-25    PT/INR - ( 25 Mar 2023 12:16 )   PT: 15.7 sec;   INR: 1.35 ratio         PTT - ( 25 Mar 2023 12:16 )  PTT:31.0 sec            03-25 @ 12:16  4.0  25              Consultant(s) Notes Reviewed:      Care Discussed with Consultants/Other Providers:

## 2023-03-27 NOTE — PROGRESS NOTE ADULT - ASSESSMENT
77  year old male    h/o  CAD s/p stent , asa.      A-fib/ PPM, , hypothyroid, and total left knee replacement    prior  PPM  interrogation  with  [facility]/   s/p  brief   bursts  of  afib/ , on prior visit   Ct chest, retrosternal hematoma.  mod left pl effusion    was  on bumex/  aldactone        *   admitted with    wprsening   pedal  edema      component  of  cellulitis   and  from  c/c  diastolic  chf,/   pt  admits to  being  non complaint   with  his meds       and  from  c/c   lymphedema,  carla left  leg      diuretics   and iv  ancef   *   h/o    c/c AFIB,   has  PPM         on eliquis     *    Anemia, , hb stable, had  been seen by  gi  eval dr joann sanchez in past     *  CAD,     cath, with 2 vessel disease,  s/p  CABG and  MVR  surg d r marni   *    h/o  Afib on    eliquis         c/c leg redness/  4 +  edema, on keflex, has  improved    *     echo,  on 7/2022,  ef  35/ new/ severe  TR          cath,   was non  obstructive    *  on synthroid       on asa/  torpol/  eliquis      crt is  1.3,  from  lasix// aldactone.  leg  swelling  is  lessening         f/p wih  dr mikayla dick         rad< from: TTE with Doppler (w/Cont) (07.05.22 @ 09:42) >  Conclusions:  Endocardial visualization enhanced with intravenous  injection of Ultrasonic Enhancing Agent (Definity).  Severe left ventricular enlargement.  Estimated ejection fraction 35%. Diffuse hypokinesis, with  inferior akinesis.  Bioprosthetic mitral valve with normal function.  Right ventricular enlargement with decreased right  ventricular systolic function.  A device wire is noted in the right heart.  ------------------------------------------------------------------------  Confirmed on  7/5/2022 - 12:16:10 by Kristian    < end of copied text >

## 2023-03-27 NOTE — PROGRESS NOTE ADULT - SUBJECTIVE AND OBJECTIVE BOX
CARDIOLOGY     PROGRESS  NOTE   ________________________________________________    CHIEF COMPLAINT:Patient is a 77y old  Male who presents with a chief complaint of leg  swelling (25 Mar 2023 17:39)  no complain  	  REVIEW OF SYSTEMS:  CONSTITUTIONAL: No fever, weight loss, or fatigue  EYES: No eye pain, visual disturbances, or discharge  ENT:  No difficulty hearing, tinnitus, vertigo; No sinus or throat pain  NECK: No pain or stiffness  RESPIRATORY: No cough, wheezing, chills or hemoptysis; decrease  Shortness of Breath  CARDIOVASCULAR: No chest pain, palpitations, passing out, dizziness, or leg swelling  GASTROINTESTINAL: No abdominal or epigastric pain. No nausea, vomiting, or hematemesis; No diarrhea or constipation. No melena or hematochezia.  GENITOURINARY: No dysuria, frequency, hematuria, or incontinence  NEUROLOGICAL: No headaches, memory loss, loss of strength, numbness, or tremors  SKIN: No itching, burning, rashes, or lesions   LYMPH Nodes: No enlarged glands  ENDOCRINE: No heat or cold intolerance; No hair loss  MUSCULOSKELETAL: No joint pain or swelling; No muscle, back, or extremity pain  PSYCHIATRIC: No depression, anxiety, mood swings, or difficulty sleeping  HEME/LYMPH: No easy bruising, or bleeding gums  ALLERGY AND IMMUNOLOGIC: No hives or eczema	    x[ ] All others negative	  [ ] Unable to obtain    PHYSICAL EXAM:  T(C): 36.9 (03-27-23 @ 04:41), Max: 37.5 (03-26-23 @ 20:37)  HR: 95 (03-27-23 @ 04:41) (86 - 114)  BP: 110/62 (03-27-23 @ 04:41) (92/48 - 110/62)  RR: 18 (03-27-23 @ 04:41) (18 - 18)  SpO2: 98% (03-27-23 @ 04:41) (95% - 98%)  Wt(kg): --  I&O's Summary    26 Mar 2023 07:01  -  27 Mar 2023 07:00  --------------------------------------------------------  IN: 735 mL / OUT: 2150 mL / NET: -1415 mL        Appearance: Normal/ anasarca	  HEENT:   Normal oral mucosa, PERRL, EOMI	  Lymphatic: No lymphadenopathy  Cardiovascular: Normal S1 S2, No JVD, No murmurs, +edema  Respiratory: Lungs clear to auscultation	  Psychiatry: A & O x 3, Mood & affect appropriate  Gastrointestinal:  Soft, Non-tender, + BS	  Skin: No rashes, No ecchymoses, No cyanosis	  Neurologic: Non-focal  Extremities: Normal range of motion, No clubbing, cyanosis , +edema  Vascular: Peripheral pulses palpable 2+ bilaterally    MEDICATIONS  (STANDING):  apixaban 5 milliGRAM(s) Oral two times a day  aspirin  chewable 81 milliGRAM(s) Oral daily  atorvastatin 40 milliGRAM(s) Oral at bedtime  ceFAZolin   IVPB 1000 milliGRAM(s) IV Intermittent every 8 hours  furosemide Infusion 5 mG/Hr (2.5 mL/Hr) IV Continuous <Continuous>  levothyroxine 137 MICROGram(s) Oral daily  metoprolol succinate  milliGRAM(s) Oral daily  spironolactone 25 milliGRAM(s) Oral two times a day      TELEMETRY: 	    ECG:  	  RADIOLOGY:  OTHER: 	  	  LABS:	 	    CARDIAC MARKERS:                                10.1   9.83  )-----------( 100      ( 27 Mar 2023 06:54 )             31.6     03-27    136  |  99  |  33<H>  ----------------------------<  93  4.0   |  25  |  1.34<H>    Ca    8.9      27 Mar 2023 06:54  Mg     1.7     03-26    TPro  7.5  /  Alb  3.9  /  TBili  1.0  /  DBili  x   /  AST  26  /  ALT  19  /  AlkPhos  111  03-25    proBNP:   Lipid Profile:   HgA1c:   TSH:   PT/INR - ( 25 Mar 2023 12:16 )   PT: 15.7 sec;   INR: 1.35 ratio         PTT - ( 25 Mar 2023 12:16 )  PTT:31.0 sec      Assessment and plan  ---------------------------  77y m pmh BPH, Chronic venous insufficiency, HLD Hypothyroidism, S/P CABG x 2, S/P mitral valve replacement, SP Status post angioplasty, Sp TKR rt, Anasarca Pt comes to ed c/o shortness of breath for past month w progressive worsening of anasarca w swelling of scrotum and diff urinating, Has been noncompliant on diuretics for past two weeks w 20lb weight gain over past month. No fever and chills, sputum, nvdc, cp. PE Adult male lying stretcher w maked swelling to josh lower extr, w pitting edema,  chest  scant josh crackles w slight wheeze and prolonged exp phase  chf acute on chronic sec to RV dysfunction  start on lasix drip, strict i/o  fu lytes  tele, AFib check HR  a,fib , continue AC  will adjust cardiac meds  continue lasix drip decrease to 5 fu i/o  add aldactone 25 mg bid  pt still sig fluid overloaded, increase drip  fu lytes

## 2023-03-28 DIAGNOSIS — I50.9 HEART FAILURE, UNSPECIFIED: ICD-10-CM

## 2023-03-28 DIAGNOSIS — R78.81 BACTEREMIA: ICD-10-CM

## 2023-03-28 LAB
ANION GAP SERPL CALC-SCNC: 14 MMOL/L — SIGNIFICANT CHANGE UP (ref 5–17)
BUN SERPL-MCNC: 42 MG/DL — HIGH (ref 7–23)
CALCIUM SERPL-MCNC: 9.3 MG/DL — SIGNIFICANT CHANGE UP (ref 8.4–10.5)
CHLORIDE SERPL-SCNC: 96 MMOL/L — SIGNIFICANT CHANGE UP (ref 96–108)
CO2 SERPL-SCNC: 28 MMOL/L — SIGNIFICANT CHANGE UP (ref 22–31)
CREAT SERPL-MCNC: 1.33 MG/DL — HIGH (ref 0.5–1.3)
EGFR: 55 ML/MIN/1.73M2 — LOW
GLUCOSE SERPL-MCNC: 112 MG/DL — HIGH (ref 70–99)
POTASSIUM SERPL-MCNC: 4.2 MMOL/L — SIGNIFICANT CHANGE UP (ref 3.5–5.3)
POTASSIUM SERPL-SCNC: 4.2 MMOL/L — SIGNIFICANT CHANGE UP (ref 3.5–5.3)
SODIUM SERPL-SCNC: 138 MMOL/L — SIGNIFICANT CHANGE UP (ref 135–145)

## 2023-03-28 PROCEDURE — 99222 1ST HOSP IP/OBS MODERATE 55: CPT

## 2023-03-28 PROCEDURE — 93280 PM DEVICE PROGR EVAL DUAL: CPT | Mod: 26

## 2023-03-28 PROCEDURE — 99223 1ST HOSP IP/OBS HIGH 75: CPT | Mod: 57

## 2023-03-28 RX ORDER — CEFAZOLIN SODIUM 1 G
2000 VIAL (EA) INJECTION EVERY 8 HOURS
Refills: 0 | Status: DISCONTINUED | OUTPATIENT
Start: 2023-03-28 | End: 2023-04-04

## 2023-03-28 RX ADMIN — APIXABAN 5 MILLIGRAM(S): 2.5 TABLET, FILM COATED ORAL at 05:29

## 2023-03-28 RX ADMIN — Medication 100 MILLIGRAM(S): at 05:30

## 2023-03-28 RX ADMIN — Medication 137 MICROGRAM(S): at 05:29

## 2023-03-28 RX ADMIN — Medication 3.75 MG/HR: at 12:06

## 2023-03-28 RX ADMIN — Medication 100 MILLIGRAM(S): at 14:36

## 2023-03-28 RX ADMIN — APIXABAN 5 MILLIGRAM(S): 2.5 TABLET, FILM COATED ORAL at 17:32

## 2023-03-28 RX ADMIN — SPIRONOLACTONE 25 MILLIGRAM(S): 25 TABLET, FILM COATED ORAL at 05:29

## 2023-03-28 RX ADMIN — SPIRONOLACTONE 25 MILLIGRAM(S): 25 TABLET, FILM COATED ORAL at 17:33

## 2023-03-28 RX ADMIN — Medication 100 MILLIGRAM(S): at 21:55

## 2023-03-28 RX ADMIN — Medication 81 MILLIGRAM(S): at 12:08

## 2023-03-28 RX ADMIN — ATORVASTATIN CALCIUM 40 MILLIGRAM(S): 80 TABLET, FILM COATED ORAL at 21:54

## 2023-03-28 RX ADMIN — Medication 100 MILLIGRAM(S): at 05:29

## 2023-03-28 NOTE — PHYSICAL THERAPY INITIAL EVALUATION ADULT - ACTIVE RANGE OF MOTION EXAMINATION, REHAB EVAL
LLE WFL at ankle and hip, no L knee ext due to prior sx with latha/josh. upper extremity Active ROM was WNL (within normal limits)/Right LE Active ROM was WFL (within functional limits)

## 2023-03-28 NOTE — CONSULT NOTE ADULT - SUBJECTIVE AND OBJECTIVE BOX
CHIEF COMPLAINT: "I gained 25lbs over the past month"     HISTORY OF PRESENT ILLNESS: 77y.o. male with pmhx significant for Permanent AFib for 6-7 years (On Eliquis), CAD S/P CABG x 2 2021, S/P bioprosthetic mitral valve replacement, SALONI 2007, S/p right TKR, BPH, Chronic venous insufficiency, HLD, HTN, Hypothyroidism, varicose veins with stripping, infection of right foot 2019, Left knee replacement with multiple infections post op, Medtronic dual chamber PPM 2012 (Gen Change in 2021).  Pt comes to ED with c/o shortness of breath for past month with progressive worsening of anasarca with swelling of scrotum and difficulty with urinating.  Admits to noncompliance with diuretics for past two weeks with 25lb weight gain over past month.  Patient states that he had recent toe nail clipping by Podiatrist approximately 1 week prior to admission.  He states that his toe has had pain and bleeding since nail clipping with RLE erythema.  Denies fevers at home but states he had fever Saturday of 103.  Patient resting in bed, denies CP, palpitations, dizziness or SOB.  Admits to improvement in lower extremity edema since admission.       Allergies    alfuzosin (Hives)  levofloxacin (Hives)  Myrbetriq (Hives)  tamsulosin (Hives)    Intolerances    MEDICATIONS:  apixaban 5 milliGRAM(s) Oral two times a day  aspirin  chewable 81 milliGRAM(s) Oral daily  furosemide Infusion 7.5 mG/Hr IV Continuous <Continuous>  metoprolol succinate  milliGRAM(s) Oral daily  spironolactone 25 milliGRAM(s) Oral two times a day    ceFAZolin   IVPB 2000 milliGRAM(s) IV Intermittent every 8 hours    atorvastatin 40 milliGRAM(s) Oral at bedtime  levothyroxine 137 MICROGram(s) Oral daily    PAST MEDICAL & SURGICAL HISTORY:  Afib  not on anticoagulation    CAD (coronary artery disease)    Varicose vein of leg    Hypothyroid    Pacemaker    H/O fracture of hip  2017    H/O asbestosis    HTN (hypertension)    Pacemaker  Medtronic - Model RVDR01 1/10/2012    Stented coronary artery  drug eluding stent 5/2007    H/O detached retina repair  1952    S/P cataract surgery    History of hip surgery  left hip ORIF    H/O varicose vein stripping  1993 left leg    History of left knee replacement  2013 - multiple infections post op requiring reoperation in 2015, 2017, 2019)    H/O foot surgery  for infection of right foot 2019    H/O inguinal hernia repair  right 1993      FAMILY HISTORY:  FH: skin cancer (Father)      REVIEW OF SYSTEMS:  See HPI. Otherwise, 10 point ROS done and otherwise negative.    PHYSICAL EXAM:  T(C): 37.7 (03-28-23 @ 15:12), Max: 37.9 (03-28-23 @ 00:12)  HR: 94 (03-28-23 @ 15:12) (75 - 100)  BP: 104/62 (03-28-23 @ 15:12) (99/59 - 110/68)  RR: 18 (03-28-23 @ 15:12) (18 - 18)  SpO2: 98% (03-28-23 @ 15:12) (92% - 98%)    27 Mar 2023 07:01  -  28 Mar 2023 07:00  --------------------------------------------------------  IN: 430 mL / OUT: 1400 mL / NET: -970 mL    28 Mar 2023 07:01  -  28 Mar 2023 18:21  --------------------------------------------------------  IN: 670.5 mL / OUT: 1100 mL / NET: -429.5 mL    Appearance: Normal	  HEENT:   Normal oral mucosa, PERRL, EOMI	  Lymphatic: No lymphadenopathy  Cardiovascular: Normal S1 S2, No JVD, No murmurs, No edema  Respiratory: Lungs clear to auscultation	  Psychiatry: A & O x 3, Mood & affect appropriate  Gastrointestinal:  Soft, Non-tender, + BS	  Skin: No rashes, No ecchymoses, No cyanosis	  Neurologic: Non-focal  Extremities: Normal range of motion, No clubbing, cyanosis or edema  Vascular: Peripheral pulses palpable 2+ bilaterally      LABS:	 	    CBC Full  -  ( 27 Mar 2023 06:54 )  WBC Count : 9.83 K/uL  Hemoglobin : 10.1 g/dL  Hematocrit : 31.6 %  Platelet Count - Automated : 100 K/uL  Mean Cell Volume : 95.5 fl  Mean Cell Hemoglobin : 30.5 pg  Mean Cell Hemoglobin Concentration : 32.0 gm/dL  Auto Neutrophil # : x  Auto Lymphocyte # : x  Auto Monocyte # : x  Auto Eosinophil # : x  Auto Basophil # : x  Auto Neutrophil % : x  Auto Lymphocyte % : x  Auto Monocyte % : x  Auto Eosinophil % : x  Auto Basophil % : x    03-28    138  |  96  |  42<H>  ----------------------------<  112<H>  4.2   |  28  |  1.33<H>  03-27    136  |  96  |  40<H>  ----------------------------<  156<H>  3.8   |  29  |  1.38<H>    Ca    9.3      28 Mar 2023 06:56  Ca    9.0      27 Mar 2023 20:59  Mg     1.7     03-26        TELEMETRY: 	  Atrial Fibrillation 60-90's     TTE:     Patient name: JANEE ARCEO  YOB: 1945   Age: 77 (M)   MR#: [id number]  Study Date: 7/5/2022  Location: [address] [place], [place] [id number]: Efrain Sorensen DHRUV  Study quality: Technically good  Referring Physician: Aracelis Mills MD  Blood Pressure: 114/68 mmHg  Height: 170 cm  Weight: 104 kg  BSA: 2.2 m2  Heart Rhythm: Atrial fibrillation  ------------------------------------------------------------------------  PROCEDURE: Transthoracic echocardiogram with 2-D, M-Mode  and complete spectral and color flow Doppler. Verbal  consent was obtained for injection of  Ultrasonic Enhancing  Agent following a discussion of risks and benefits.  Following intravenous injection of Ultrasonic Enhancing  Agent, harmonic imaging was performed.  INDICATION: Heart failure, unspecified (I50.9), Pericardial  effusion (noninflammatory) (I31.3), Nonrheumatic mitral  (valve) insufficiency (I34.0)  ------------------------------------------------------------------------  Dimensions:    Normal Values:  LA: 6.4    2.0 - 4.0 cm  Ao:     4.3    2.0 - 3.8 cm  SEPTUM: 1.1    0.6 - 1.2 cm  PWT:    1.1    0.6 - 1.1 cm  LVIDd:  6.3    3.0 - 5.6 cm  LVIDs:  5.0    1.8 - 4.0 cm  Derived variables:  LVMI: 141 g/m2  RWT: 0.34  EF (Visual Estimate): 35 %  Doppler Peak Velocity (m/sec): TV=2.6  ------------------------------------------------------------------------  Observations:  Mitral Valve: Bioprosthetic mitral valve with normal  function. Mean gradient 5.2 mmHg at  78/min.  No mitral valve regurgitation seen.  Aortic Valve/Aorta: Calcified aortic valve with normal  opening. Mild aortic regurgitation.  Mild aortic root dilatation.  Left Atrium: Severely dilated left atrium.  Left Ventricle: Endocardial visualization enhanced with  intravenous injectionof Ultrasonic Enhancing Agent  (Definity).  Severe left ventricular enlargement.  Estimated ejection fraction 35%. Diffuse hypokinesis, with  inferior akinesis.  Right Heart: Mild right atrial enlargement.  Right ventricular enlargement with decreased right  ventricular systolic function.  A device wire is noted in the right heart.  Moderate-severe tricuspid regurgitation.  Normal pulmonic valve. Minimal pulmonic regurgitation.  Pericardium/Pleura: Normal pericardium with no pericardial  effusion.  Hemodynamic: Estimated PASP = 26 mmHg + estimated mean  right atrial pressure.  ------------------------------------------------------------------------  Conclusions:  Endocardial visualization enhanced with intravenous  injection of Ultrasonic Enhancing Agent (Definity).  Severe left ventricular enlargement.  Estimated ejection fraction 35%. Diffuse hypokinesis, with  inferior akinesis.  Bioprosthetic mitral valve with normal function.  Right ventricular enlargement with decreased right  ventricular systolic function.  A device wire is noted in the right heart.  ------------------------------------------------------------------------  Confirmed on  7/5/2022 - 12:16:10 by David Lyons MD, FASE  --------------

## 2023-03-28 NOTE — PHYSICAL THERAPY INITIAL EVALUATION ADULT - DIAGNOSIS, PT EVAL
decreased functional mobility secondary to generalized weakness, impaired balance, SOB with activity, decreased ROM

## 2023-03-28 NOTE — CONSULT NOTE ADULT - ASSESSMENT
76 y/o male PMHx HTN, HLD, CAD s/p stent on ASA, A-fib, hypothyroid, CHF now presenting to the ED worsening SOB, DUNCAN, peripheral edema, scrotal edema and 20 pound unintentional weight gain over last month with MSSA bacteremia, HF    Kevin Olmos  Attending Physician   Division of Infectious Disease  Office #[phone]  Available on Microsoft Teams also  After 5pm/weekend or no response, call #[phone]

## 2023-03-28 NOTE — CONSULT NOTE ADULT - SUBJECTIVE AND OBJECTIVE BOX
MARIELOS JANEE 77y Male  MRN-[id number]    Patient is a 77y old  Male who presents with a chief complaint of leg  swelling (25 Mar 2023 17:39)      HPI:  CHIEF COMPLAINT:Patient is a 77y old  Male who presents with a chief complaint of leg  swelling (25 Mar 2023 17:39)      HPI:  78 y/o male PMHx HTN, HLD, CAD s/p stent on ASA, A-fib, hypothyroid, CHF now presenting to the ED worsening SOB, DUNCAN, peripheral edema, scrotal edema and 20 pound unintentional weight gain over last month. Patient reported non compliance with diuretics as he had doctors appts to attend to and did not want to persistently have to void. Patient is experiencing difficulty voiding 2/2 scrotal edema. Denied CP, cough, fever, N/V/D,    ID called for bacteremia. Pt had his toenails clipped with podiatry about 1 week prior and noted with bleeding and swelling from left 1st toe which has now improved     PAST MEDICAL & SURGICAL HISTORY:  Afib  not on anticoagulation      CAD (coronary artery disease)      Varicose vein of leg      Hypothyroid      Pacemaker      H/O fracture of hip  2017      H/O asbestosis      HTN (hypertension)      Pacemaker  Medtronic - Model RVDR01 1/10/2012      Stented coronary artery  drug eluding stent 5/2007      H/O detached retina repair  1952      S/P cataract surgery      History of hip surgery  left hip ORIF      H/O varicose vein stripping  1993 left leg      History of left knee replacement  2013 - multiple infections post op requiring reoperation in 2015, 2017, 2019)      H/O foot surgery  for infection of right foot 2019      H/O inguinal hernia repair  right 1993          Allergies    alfuzosin (Hives)  levofloxacin (Hives)  Myrbetriq (Hives)  tamsulosin (Hives)    Intolerances        ANTIMICROBIALS:  ceFAZolin   IVPB 2000 every 8 hours      MEDICATIONS  (STANDING):  apixaban 5 milliGRAM(s) Oral two times a day  aspirin  chewable 81 milliGRAM(s) Oral daily  atorvastatin 40 milliGRAM(s) Oral at bedtime  ceFAZolin   IVPB 2000 milliGRAM(s) IV Intermittent every 8 hours  furosemide Infusion 7.5 mG/Hr (3.75 mL/Hr) IV Continuous <Continuous>  levothyroxine 137 MICROGram(s) Oral daily  metoprolol succinate  milliGRAM(s) Oral daily  spironolactone 25 milliGRAM(s) Oral two times a day      Social History   Smoking: no  Etoh: no  Drug use: no      FAMILY HISTORY:  FH: skin cancer (Father)        Vital Signs Last 24 Hrs  T(C): 37.7 (28 Mar 2023 15:12), Max: 37.9 (28 Mar 2023 00:12)  T(F): 99.8 (28 Mar 2023 15:12), Max: 100.2 (28 Mar 2023 00:12)  HR: 94 (28 Mar 2023 15:12) (75 - 100)  BP: 104/62 (28 Mar 2023 15:12) (99/59 - 110/68)  BP(mean): --  RR: 18 (28 Mar 2023 15:12) (18 - 18)  SpO2: 98% (28 Mar 2023 15:12) (92% - 98%)    Parameters below as of 28 Mar 2023 15:12  Patient On (Oxygen Delivery Method): room air        CBC Full  -  ( 27 Mar 2023 06:54 )  WBC Count : 9.83 K/uL  RBC Count : 3.31 M/uL  Hemoglobin : 10.1 g/dL  Hematocrit : 31.6 %  Platelet Count - Automated : 100 K/uL  Mean Cell Volume : 95.5 fl  Mean Cell Hemoglobin : 30.5 pg  Mean Cell Hemoglobin Concentration : 32.0 gm/dL  Auto Neutrophil # : x  Auto Lymphocyte # : x  Auto Monocyte # : x  Auto Eosinophil # : x  Auto Basophil # : x  Auto Neutrophil % : x  Auto Lymphocyte % : x  Auto Monocyte % : x  Auto Eosinophil % : x  Auto Basophil % : x    03-28    138  |  96  |  42<H>  ----------------------------<  112<H>  4.2   |  28  |  1.33<H>    Ca    9.3      28 Mar 2023 06:56  Mg     1.7     03-26            MICROBIOLOGY:  .Blood  03-25-23   Growth in anaerobic bottle: Gram Positive Cocci in Clusters  ***Blood Panel PCR results on this specimen are available  approximately 3 hours after the Gram stain result.***  Gram stain, PCR, and/or culture results may not always  correspond due todifference in methodologies.  ************************************************************  This PCR assay was performed by multiplex PCR. This  Assay tests for 66 bacterial and resistance gene targets.  Please refer to the [facility] testdirectory  at [email or web address] for details.  --  Blood Culture PCR      .Blood  03-25-23   No growth to date.  --  --      Rapid RVP Result: NotDetec (03-25 @ 11:55)            RADIOLOGY  < from: VA Duplex Lower Ext Vein Scan, Left (03.25.23 @ 20:21) >  No evidence of left lower extremity proximal deep venous thrombosis.  The calf veins are not visualized secondary to technical factors.    < end of copied text >  < from: Xray Tibia + Fibula 2 Views, Left (03.25.23 @ 12:41) >  Arthrodesis of left knee with intramedullary nailing and bony fusion of   the femur and tibia.  Medial left tibial shaft lucent nidus measuring 3.2 x 1.0 cm with   surrounding osteosclerotic reaction.    < end of copied text >

## 2023-03-28 NOTE — CONSULT NOTE ADULT - ASSESSMENT
77 y.o. male with PMHX significant for Permanent AFib for 6-7 years (On Eliquis), CAD S/P CABG x 2 2021, S/P bioprosthetic mitral valve replacement, SALONI 2007, S/p right TKR, BPH, Chronic venous insufficiency, HLD, HTN, Hypothyroidism, varicose veins with stripping, infection of right foot 2019, Left knee replacement with multiple infections post op, Medtronic dual chamber PPM 2012 (Gen Change in 2021).  Pt comes to ED with c/o shortness of breath for past month with progressive worsening of anasarca with swelling of scrotum and difficulty with urinating.  Admits to noncompliance with diuretics for past two weeks with 25lb weight gain over past month.  Patient states that he had recent toe nail clipping by Podiatrist approximately 1 week prior to admission.  He states that his toe has had pain and bleeding since nail clipping with RLE erythema.  Denies fevers at home but states he had fever Saturday of 103.  Admits to improvement in lower extremity edema since admission.     Medtronic dual chamber PPM (9/2021)  Atrial Lead: 5086 (Implanted 1/10/2012)  RV Lead: 5086 (Implanted 1/10/2012)    1.  MSSA Bacteremia  2. HFrEF exacerbation with EF 35%  3. Permanent AFib     - s/p pacemaker interrogation with normal device function. Patient is in AFib, not pacer dependent.  VPaced 35.7%.  Time in AT/%.  See interrogation note for full device details.  - Hold anticoagulation Eliquis for possible pacemaker extraction Thursday 3/30   - Continue on IV Ancef per ID.  Follow up cultures  - Current on IV Lasix drip, monitor intake and output, weights   - Will continue to follow closely     NATY Dillon [facility]  [id number]

## 2023-03-28 NOTE — PROGRESS NOTE ADULT - ASSESSMENT
77  year old male    h/o  CAD s/p stent , asa.      A-fib/ PPM, , hypothyroid, and total left knee replacement    prior  PPM  interrogation  with  [facility]/   s/p  brief   bursts  of  afib/ , on prior visit   Ct chest, retrosternal hematoma.  mod left pl effusion    was  on bumex/  aldactone        *   admitted with    wprsening   pedal  edema        component  of  cellulitis   and  from  c/c  diastolic  chf,/        pt  admits to  being  non complaint   with  his meds       and  from  c/c   lymphedema,  carla left  leg      diuretics   and iv  ancef   *   h/o    c/c AFIB,   has  PPM         on eliquis     *    Anemia, , hb stable, had  been seen by  gi  eval dr joann sanchez in past     *  CAD,     cath, with 2 vessel disease,  s/p  CABG and  MVR  surg d r marni   *    h/o  Afib on    eliquis         c/c leg redness/  4 +  edema, on keflex, has  improved    *     echo,  on 7/2022,  ef  35/ new/ severe  TR          cath,   was non  obstructive    *  on synthroid          on asa/  torpol/  eliquis         crt is  1.3,  from  lasix// aldactone.  leg  swelling  is  lessening   *  MSSA  bactrenia       follow  echo/  ID   eval        on iv ancef         f/p wih  dr mikayla dick         rad< from: TTE with Doppler (w/Cont) (07.05.22 @ 09:42) >  Conclusions:  Endocardial visualization enhanced with intravenous  injection of Ultrasonic Enhancing Agent (Definity).  Severe left ventricular enlargement.  Estimated ejection fraction 35%. Diffuse hypokinesis, with  inferior akinesis.  Bioprosthetic mitral valve with normal function.  Right ventricular enlargement with decreased right  ventricular systolic function.  A device wire is noted in the right heart.  ------------------------------------------------------------------------  Confirmed on  7/5/2022 - 12:16:10 by Kristian    < end of copied text >

## 2023-03-28 NOTE — PHYSICAL THERAPY INITIAL EVALUATION ADULT - ADDITIONAL COMMENTS
Pt lives alone in apartment with 2 stairs to entrance then elevator. Prior to admission pt independent with all functional mobility including ambulation with 3-wheeled walker. Pt lives alone in apartment with 2 stairs to entrance then elevator. Prior to admission pt independent with all functional mobility including ambulation with 3-wheeled walker. Pt reports he most recently was staying with girlfriend in apartment, 3 stairs to entrance with bilateral HR.

## 2023-03-28 NOTE — PROGRESS NOTE ADULT - SUBJECTIVE AND OBJECTIVE BOX
was  febrile.    REVIEW OF SYSTEMS:  CONSTITUTIONAL: No fever,  no  weight loss  ENT:  No  tinnitus,   no   vertigo  NECK: No pain or stiffness  RESPIRATORY: No cough, wheezing, chills or hemoptysis;    No Shortness of Breath  CARDIOVASCULAR: No chest pain, palpitations, dizziness  GASTROINTESTINAL: No abdominal or epigastric pain. No nausea, vomiting, or hematemesis; No diarrhea  No melena or hematochezia.  GENITOURINARY: No dysuria, frequency, hematuria, or incontinence  NEUROLOGICAL: No headaches  SKIN: No itching,  no   rash  LYMPH Nodes: No enlarged glands  ENDOCRINE: No heat or cold intolerance  MUSCULOSKELETAL: No joint pain or swelling  PSYCHIATRIC: No depression, anxiety  HEME/LYMPH: No easy bruising, or bleeding gums  ALLERGY AND IMMUNOLOGIC: No hives or eczema	    MEDICATIONS  (STANDING):  apixaban 5 milliGRAM(s) Oral two times a day  aspirin  chewable 81 milliGRAM(s) Oral daily  atorvastatin 40 milliGRAM(s) Oral at bedtime  ceFAZolin   IVPB 1000 milliGRAM(s) IV Intermittent every 8 hours  furosemide Infusion 7.5 mG/Hr (3.75 mL/Hr) IV Continuous <Continuous>  levothyroxine 137 MICROGram(s) Oral daily  metoprolol succinate  milliGRAM(s) Oral daily  spironolactone 25 milliGRAM(s) Oral two times a day    MEDICATIONS  (PRN):      Vital Signs Last 24 Hrs  T(C): 37.4 (28 Mar 2023 05:01), Max: 37.9 (28 Mar 2023 00:12)  T(F): 99.4 (28 Mar 2023 05:01), Max: 100.2 (28 Mar 2023 00:12)  HR: 100 (28 Mar 2023 05:01) (64 - 100)  BP: 106/66 (28 Mar 2023 05:01) (95/54 - 106/66)  BP(mean): --  RR: 18 (28 Mar 2023 05:01) (18 - 18)  SpO2: 96% (28 Mar 2023 05:01) (92% - 98%)    Parameters below as of 28 Mar 2023 05:01  Patient On (Oxygen Delivery Method): room air      CAPILLARY BLOOD GLUCOSE        I&O's Summary    27 Mar 2023 07:01  -  28 Mar 2023 07:00  --------------------------------------------------------  IN: 430 mL / OUT: 1400 mL / NET: -970 mL          Appearance: Normal	  HEENT:   Normal oral mucosa, PERRL, EOMI	  Lymphatic: No lymphadenopathy  Cardiovascular: Normal S1 S2, No JVD  Respiratory: Lungs clear to auscultation	  Psychiatry: A & O x 3, Mood & affect appropriate  Gastrointestinal:  Soft, Non-tender, + BS	  Skin: No rash, No ecchymoses	  Extremities: Normal range of motion  c/c  edema    LABS:                        10.1   9.83  )-----------( 100      ( 27 Mar 2023 06:54 )             31.6     03-27    136  |  96  |  40<H>  ----------------------------<  156<H>  3.8   |  29  |  1.38<H>    Ca    9.0      27 Mar 2023 20:59  Mg     1.7     03-26                              Consultant(s) Notes Reviewed:      Care Discussed with Consultants/Other Providers:

## 2023-03-28 NOTE — PROGRESS NOTE ADULT - SUBJECTIVE AND OBJECTIVE BOX
CARDIOLOGY     PROGRESS  NOTE   ________________________________________________    CHIEF COMPLAINT:Patient is a 77y old  Male who presents with a chief complaint of leg  swelling (25 Mar 2023 17:39)  no complain  	  REVIEW OF SYSTEMS:  CONSTITUTIONAL: No fever, weight loss, or fatigue  EYES: No eye pain, visual disturbances, or discharge  ENT:  No difficulty hearing, tinnitus, vertigo; No sinus or throat pain  NECK: No pain or stiffness  RESPIRATORY: No cough, wheezing, chills or hemoptysis; + mild Shortness of Breath  CARDIOVASCULAR: No chest pain, palpitations, passing out, dizziness, or leg swelling  GASTROINTESTINAL: No abdominal or epigastric pain. No nausea, vomiting, or hematemesis; No diarrhea or constipation. No melena or hematochezia.  GENITOURINARY: No dysuria, frequency, hematuria, or incontinence  NEUROLOGICAL: No headaches, memory loss, loss of strength, numbness, or tremors  SKIN: No itching, burning, rashes, or lesions   LYMPH Nodes: No enlarged glands  ENDOCRINE: No heat or cold intolerance; No hair loss  MUSCULOSKELETAL: No joint pain or swelling; No muscle, back, or extremity pain  PSYCHIATRIC: No depression, anxiety, mood swings, or difficulty sleeping  HEME/LYMPH: No easy bruising, or bleeding gums  ALLERGY AND IMMUNOLOGIC: No hives or eczema	    [ ] All others negative	  [ ] Unable to obtain    PHYSICAL EXAM:  T(C): 37.4 (03-28-23 @ 05:01), Max: 37.9 (03-28-23 @ 00:12)  HR: 100 (03-28-23 @ 05:01) (64 - 100)  BP: 106/66 (03-28-23 @ 05:01) (95/54 - 106/66)  RR: 18 (03-28-23 @ 05:01) (18 - 18)  SpO2: 96% (03-28-23 @ 05:01) (92% - 97%)  Wt(kg): --  I&O's Summary    27 Mar 2023 07:01  -  28 Mar 2023 07:00  --------------------------------------------------------  IN: 430 mL / OUT: 1400 mL / NET: -970 mL        Appearance: Normal	  HEENT:   Normal oral mucosa, PERRL, EOMI	  Lymphatic: No lymphadenopathy  Cardiovascular: Normal S1 S2, No JVD, + murmurs, +3 edema  Respiratory:rhonchi  Gastrointestinal:  Soft, Non-tender, + BS	  Skin: No rashes, No ecchymoses, No cyanosis	  Neurologic: Non-focal  Extremities: Normal range of motion, No clubbing, cyanosis + 3edema  Vascular: Peripheral pulses palpable 2+ bilaterally    MEDICATIONS  (STANDING):  apixaban 5 milliGRAM(s) Oral two times a day  aspirin  chewable 81 milliGRAM(s) Oral daily  atorvastatin 40 milliGRAM(s) Oral at bedtime  ceFAZolin   IVPB 1000 milliGRAM(s) IV Intermittent every 8 hours  furosemide Infusion 7.5 mG/Hr (3.75 mL/Hr) IV Continuous <Continuous>  levothyroxine 137 MICROGram(s) Oral daily  metoprolol succinate  milliGRAM(s) Oral daily  spironolactone 25 milliGRAM(s) Oral two times a day      TELEMETRY: 	    ECG:  	  RADIOLOGY:  OTHER: 	  	  LABS:	 	    CARDIAC MARKERS:                                10.1   9.83  )-----------( 100      ( 27 Mar 2023 06:54 )             31.6     03-28    138  |  96  |  42<H>  ----------------------------<  112<H>  4.2   |  28  |  1.33<H>    Ca    9.3      28 Mar 2023 06:56  Mg     1.7     03-26      proBNP:   Lipid Profile:   HgA1c:   TSH:         Assessment and plan  ---------------------------  77y m pmh BPH, Chronic venous insufficiency, HLD Hypothyroidism, S/P CABG x 2, S/P mitral valve replacement, SP Status post angioplasty, Sp TKR rt, Anasarca Pt comes to ed c/o shortness of breath for past month w progressive worsening of anasarca w swelling of scrotum and diff urinating, Has been noncompliant on diuretics for past two weeks w 20lb weight gain over past month. No fever and chills, sputum, nvdc, cp. PE Adult male lying stretcher w maked swelling to josh lower extr, w pitting edema,  chest  scant josh crackles w slight wheeze and prolonged exp phase  chf acute on chronic sec to RV dysfunction  start on lasix drip, strict i/o  fu lytes  tele, AFib check HR  a,fib , continue AC  will adjust cardiac meds  add aldactone 25 mg bid  pt still sig fluid overloaded, increase drip to 10 mg  fu lytes

## 2023-03-28 NOTE — PHYSICAL THERAPY INITIAL EVALUATION ADULT - PERTINENT HX OF CURRENT PROBLEM, REHAB EVAL
77y male with pmhx BPH, Chronic venous insufficiency, HLD Hypothyroidism, CABG x 2, mitral valve replacement, angioplasty, TKR RT, Anasarca; presents to ED with c/o shortness of breath for past month w/progressive worsening of anasarca w/swelling of scrotum and BLE, and difficulty urinating. Pt has been noncompliant on diuretics for past two weeks w/20lb weight gain over past month. No fever and chills, sputum. A/w CHF acute on chronic due to RV dysfunction. Started on lasix drip, with strict i/o.

## 2023-03-28 NOTE — CONSULT NOTE ADULT - PROBLEM SELECTOR RECOMMENDATION 9
-suspect this is from his LE - possibly from left 1st given recent toenail clipping and bleeding and swelling - now better   -ancef 2 gm iv q8  -repeat bcx x 2  -check TTE  -monitor wbc, creatinine

## 2023-03-28 NOTE — PHYSICAL THERAPY INITIAL EVALUATION ADULT - PLANNED THERAPY INTERVENTIONS, PT EVAL
Goal: Patient will negotiate up and down 3 steps with bilateral rail independently, within 4 weeks./balance training/bed mobility training/gait training/transfer training

## 2023-03-28 NOTE — PROCEDURE NOTE - ADDITIONAL PROCEDURE DETAILS
Indication: MSSA Bacteremia, assess dependency  - normal pacemaker function. Normal sensing and pacing thresholds.   - stable lead impedance  - Review of stored data did not reveal any new events for review.   - Patient is not pacemaker dependent. V Paced 35.7%  - AT/AF burden is 100%    [id number]

## 2023-03-29 LAB
ANION GAP SERPL CALC-SCNC: 16 MMOL/L — SIGNIFICANT CHANGE UP (ref 5–17)
ANION GAP SERPL CALC-SCNC: 7 MMOL/L — SIGNIFICANT CHANGE UP (ref 5–17)
APTT BLD: 42.7 SEC — HIGH (ref 27.5–35.5)
BLD GP AB SCN SERPL QL: NEGATIVE — SIGNIFICANT CHANGE UP
BUN SERPL-MCNC: 45 MG/DL — HIGH (ref 7–23)
BUN SERPL-MCNC: 48 MG/DL — HIGH (ref 7–23)
CALCIUM SERPL-MCNC: 9.2 MG/DL — SIGNIFICANT CHANGE UP (ref 8.4–10.5)
CALCIUM SERPL-MCNC: 9.6 MG/DL — SIGNIFICANT CHANGE UP (ref 8.4–10.5)
CHLORIDE SERPL-SCNC: 92 MMOL/L — LOW (ref 96–108)
CHLORIDE SERPL-SCNC: 92 MMOL/L — LOW (ref 96–108)
CO2 SERPL-SCNC: 27 MMOL/L — SIGNIFICANT CHANGE UP (ref 22–31)
CO2 SERPL-SCNC: 36 MMOL/L — HIGH (ref 22–31)
CREAT SERPL-MCNC: 1.25 MG/DL — SIGNIFICANT CHANGE UP (ref 0.5–1.3)
CREAT SERPL-MCNC: 1.41 MG/DL — HIGH (ref 0.5–1.3)
EGFR: 52 ML/MIN/1.73M2 — LOW
EGFR: 60 ML/MIN/1.73M2 — SIGNIFICANT CHANGE UP
GLUCOSE SERPL-MCNC: 123 MG/DL — HIGH (ref 70–99)
GLUCOSE SERPL-MCNC: 183 MG/DL — HIGH (ref 70–99)
HCT VFR BLD CALC: 32.5 % — LOW (ref 39–50)
HCT VFR BLD CALC: 32.9 % — LOW (ref 39–50)
HGB BLD-MCNC: 10.6 G/DL — LOW (ref 13–17)
HGB BLD-MCNC: 10.7 G/DL — LOW (ref 13–17)
MCHC RBC-ENTMCNC: 29.7 PG — SIGNIFICANT CHANGE UP (ref 27–34)
MCHC RBC-ENTMCNC: 30 PG — SIGNIFICANT CHANGE UP (ref 27–34)
MCHC RBC-ENTMCNC: 32.5 GM/DL — SIGNIFICANT CHANGE UP (ref 32–36)
MCHC RBC-ENTMCNC: 32.6 GM/DL — SIGNIFICANT CHANGE UP (ref 32–36)
MCV RBC AUTO: 91.4 FL — SIGNIFICANT CHANGE UP (ref 80–100)
MCV RBC AUTO: 92.1 FL — SIGNIFICANT CHANGE UP (ref 80–100)
NRBC # BLD: 0 /100 WBCS — SIGNIFICANT CHANGE UP (ref 0–0)
NRBC # BLD: 0 /100 WBCS — SIGNIFICANT CHANGE UP (ref 0–0)
PLATELET # BLD AUTO: 141 K/UL — LOW (ref 150–400)
PLATELET # BLD AUTO: 172 K/UL — SIGNIFICANT CHANGE UP (ref 150–400)
POTASSIUM SERPL-MCNC: 3.6 MMOL/L — SIGNIFICANT CHANGE UP (ref 3.5–5.3)
POTASSIUM SERPL-MCNC: 3.7 MMOL/L — SIGNIFICANT CHANGE UP (ref 3.5–5.3)
POTASSIUM SERPL-SCNC: 3.6 MMOL/L — SIGNIFICANT CHANGE UP (ref 3.5–5.3)
POTASSIUM SERPL-SCNC: 3.7 MMOL/L — SIGNIFICANT CHANGE UP (ref 3.5–5.3)
RBC # BLD: 3.53 M/UL — LOW (ref 4.2–5.8)
RBC # BLD: 3.6 M/UL — LOW (ref 4.2–5.8)
RBC # FLD: 13.8 % — SIGNIFICANT CHANGE UP (ref 10.3–14.5)
RBC # FLD: 13.9 % — SIGNIFICANT CHANGE UP (ref 10.3–14.5)
RH IG SCN BLD-IMP: NEGATIVE — SIGNIFICANT CHANGE UP
SARS-COV-2 RNA SPEC QL NAA+PROBE: SIGNIFICANT CHANGE UP
SODIUM SERPL-SCNC: 135 MMOL/L — SIGNIFICANT CHANGE UP (ref 135–145)
SODIUM SERPL-SCNC: 135 MMOL/L — SIGNIFICANT CHANGE UP (ref 135–145)
UFH PPP CHRO-ACNC: 1.54 IU/ML — CRITICAL HIGH (ref 0.3–0.7)
WBC # BLD: 8.49 K/UL — SIGNIFICANT CHANGE UP (ref 3.8–10.5)
WBC # BLD: 9.51 K/UL — SIGNIFICANT CHANGE UP (ref 3.8–10.5)
WBC # FLD AUTO: 8.49 K/UL — SIGNIFICANT CHANGE UP (ref 3.8–10.5)
WBC # FLD AUTO: 9.51 K/UL — SIGNIFICANT CHANGE UP (ref 3.8–10.5)

## 2023-03-29 PROCEDURE — 99024 POSTOP FOLLOW-UP VISIT: CPT

## 2023-03-29 PROCEDURE — 33233 REMOVAL OF PM GENERATOR: CPT

## 2023-03-29 PROCEDURE — 33274 TCAT INSJ/RPL PERM LDLS PM: CPT

## 2023-03-29 PROCEDURE — 93306 TTE W/DOPPLER COMPLETE: CPT | Mod: 26

## 2023-03-29 PROCEDURE — 33235 REMOVAL PACEMAKER ELECTRODE: CPT

## 2023-03-29 PROCEDURE — 99233 SBSQ HOSP IP/OBS HIGH 50: CPT

## 2023-03-29 PROCEDURE — 99232 SBSQ HOSP IP/OBS MODERATE 35: CPT

## 2023-03-29 RX ORDER — CHLORHEXIDINE GLUCONATE 213 G/1000ML
1 SOLUTION TOPICAL EVERY 12 HOURS
Refills: 0 | Status: COMPLETED | OUTPATIENT
Start: 2023-03-29 | End: 2023-03-30

## 2023-03-29 RX ORDER — HEPARIN SODIUM 5000 [USP'U]/ML
1200 INJECTION INTRAVENOUS; SUBCUTANEOUS
Qty: 25000 | Refills: 0 | Status: DISCONTINUED | OUTPATIENT
Start: 2023-03-29 | End: 2023-03-29

## 2023-03-29 RX ORDER — HEPARIN SODIUM 5000 [USP'U]/ML
1300 INJECTION INTRAVENOUS; SUBCUTANEOUS
Qty: 25000 | Refills: 0 | Status: DISCONTINUED | OUTPATIENT
Start: 2023-03-29 | End: 2023-03-30

## 2023-03-29 RX ADMIN — Medication 100 MILLIGRAM(S): at 13:23

## 2023-03-29 RX ADMIN — Medication 100 MILLIGRAM(S): at 05:28

## 2023-03-29 RX ADMIN — SPIRONOLACTONE 25 MILLIGRAM(S): 25 TABLET, FILM COATED ORAL at 05:27

## 2023-03-29 RX ADMIN — CHLORHEXIDINE GLUCONATE 1 APPLICATION(S): 213 SOLUTION TOPICAL at 18:04

## 2023-03-29 RX ADMIN — Medication 100 MILLIGRAM(S): at 05:27

## 2023-03-29 RX ADMIN — HEPARIN SODIUM 12 UNIT(S)/HR: 5000 INJECTION INTRAVENOUS; SUBCUTANEOUS at 12:06

## 2023-03-29 RX ADMIN — Medication 100 MILLIGRAM(S): at 22:13

## 2023-03-29 RX ADMIN — SPIRONOLACTONE 25 MILLIGRAM(S): 25 TABLET, FILM COATED ORAL at 18:05

## 2023-03-29 RX ADMIN — Medication 3.75 MG/HR: at 08:12

## 2023-03-29 RX ADMIN — Medication 81 MILLIGRAM(S): at 12:06

## 2023-03-29 RX ADMIN — ATORVASTATIN CALCIUM 40 MILLIGRAM(S): 80 TABLET, FILM COATED ORAL at 22:13

## 2023-03-29 RX ADMIN — HEPARIN SODIUM 13 UNIT(S)/HR: 5000 INJECTION INTRAVENOUS; SUBCUTANEOUS at 22:55

## 2023-03-29 NOTE — PROGRESS NOTE ADULT - SUBJECTIVE AND OBJECTIVE BOX
24H hour events: Patient seen at bedside on 3DSU. Plan for possible PPM extraction Thursday, 3/30. Continues on lasix drip with improving LE edema.     MEDICATIONS:  aspirin  chewable 81 milliGRAM(s) Oral daily  furosemide Infusion 7.5 mG/Hr IV Continuous <Continuous>  metoprolol succinate  milliGRAM(s) Oral daily  spironolactone 25 milliGRAM(s) Oral two times a day  ceFAZolin   IVPB 2000 milliGRAM(s) IV Intermittent every 8 hours  atorvastatin 40 milliGRAM(s) Oral at bedtime  levothyroxine 137 MICROGram(s) Oral daily      REVIEW OF SYSTEMS:  Complete 12point ROS negative.    PHYSICAL EXAM:  T(C): 37.1 (03-29-23 @ 04:33), Max: 37.7 (03-28-23 @ 11:30)  HR: 67 (03-29-23 @ 04:33) (67 - 97)  BP: 124/73 (03-29-23 @ 04:33) (104/62 - 124/73)  RR: 18 (03-29-23 @ 04:33) (18 - 18)  SpO2: 94% (03-29-23 @ 04:33) (93% - 98%)  Wt(kg): --  I&O's Summary    28 Mar 2023 07:01  -  29 Mar 2023 07:00  --------------------------------------------------------  IN: 910.5 mL / OUT: 4300 mL / NET: -3389.5 mL        Appearance: Normal	  HEENT:   Normal oral mucosa, PERRL, EOMI	  Cardiovascular: Normal S1 S2, No JVD, No murmurs, No edema  Respiratory: Lungs clear to auscultation	  Psychiatry: A & O x 3, Mood & affect appropriate  Gastrointestinal:  Soft, Non-tender, + BS	  Skin: No rashes, No ecchymoses, No cyanosis	  Neurologic: Non-focal  Extremities: Normal range of motion, No clubbing, cyanosis or edema  Vascular: Peripheral pulses palpable 2+ bilaterally        LABS:	 	    CBC Full  -  ( 29 Mar 2023 06:30 )  WBC Count : 9.51 K/uL  Hemoglobin : 10.6 g/dL  Hematocrit : 32.5 %  Platelet Count - Automated : 141 K/uL  Mean Cell Volume : 92.1 fl  Mean Cell Hemoglobin : 30.0 pg  Mean Cell Hemoglobin Concentration : 32.6 gm/dL  Auto Neutrophil # : x  Auto Lymphocyte # : x  Auto Monocyte # : x  Auto Eosinophil # : x  Auto Basophil # : x  Auto Neutrophil % : x  Auto Lymphocyte % : x  Auto Monocyte % : x  Auto Eosinophil % : x  Auto Basophil % : x    03-29    135  |  92<L>  |  45<H>  ----------------------------<  123<H>  3.7   |  27  |  1.41<H>  03-28    138  |  96  |  42<H>  ----------------------------<  112<H>  4.2   |  28  |  1.33<H>    Ca    9.2      29 Mar 2023 06:30  Ca    9.3      28 Mar 2023 06:56      proBNP:   Lipid Profile:   HgA1c:   TSH:         CARDIAC MARKERS:          TELEMETRY: 	    ECG:  	  RADIOLOGY:       24H hour events: Patient seen at bedside on 3DSU. Plan for possible PPM extraction Thursday, 3/30. Continues on lasix drip with improving LE edema.     MEDICATIONS:  aspirin  chewable 81 milliGRAM(s) Oral daily  furosemide Infusion 7.5 mG/Hr IV Continuous <Continuous>  metoprolol succinate  milliGRAM(s) Oral daily  spironolactone 25 milliGRAM(s) Oral two times a day  ceFAZolin   IVPB 2000 milliGRAM(s) IV Intermittent every 8 hours  atorvastatin 40 milliGRAM(s) Oral at bedtime  levothyroxine 137 MICROGram(s) Oral daily      REVIEW OF SYSTEMS:  Complete 12point ROS negative.    PHYSICAL EXAM:  T(C): 37.1 (03-29-23 @ 04:33), Max: 37.7 (03-28-23 @ 11:30)  HR: 67 (03-29-23 @ 04:33) (67 - 97)  BP: 124/73 (03-29-23 @ 04:33) (104/62 - 124/73)  RR: 18 (03-29-23 @ 04:33) (18 - 18)  SpO2: 94% (03-29-23 @ 04:33) (93% - 98%)  Wt(kg): --  I&O's Summary    28 Mar 2023 07:01  -  29 Mar 2023 07:00  --------------------------------------------------------  IN: 910.5 mL / OUT: 4300 mL / NET: -3389.5 mL        Appearance: Normal	  HEENT: Normal oral mucosa, PERRL, EOMI	  Cardiovascular: Normal S1 S2, irregular, No JVD, No murmurs  Respiratory: Lungs clear to auscultation	  Psychiatry: A & O x 3, Mood & affect appropriate  Gastrointestinal:  Soft, Non-tender, + BS	  Skin: b/l LE pitting edema, R LE cellulitis, No ecchymoses, No cyanosis	  Neurologic: Non-focal  Extremities: limited range of motion in left LE, No clubbing, cyanosis   Vascular: Peripheral pulses palpable 2+ bilaterally        LABS:	 	    CBC Full  -  ( 29 Mar 2023 06:30 )  WBC Count : 9.51 K/uL  Hemoglobin : 10.6 g/dL  Hematocrit : 32.5 %  Platelet Count - Automated : 141 K/uL  Mean Cell Volume : 92.1 fl  Mean Cell Hemoglobin : 30.0 pg  Mean Cell Hemoglobin Concentration : 32.6 gm/dL  Auto Neutrophil # : x  Auto Lymphocyte # : x  Auto Monocyte # : x  Auto Eosinophil # : x  Auto Basophil # : x  Auto Neutrophil % : x  Auto Lymphocyte % : x  Auto Monocyte % : x  Auto Eosinophil % : x  Auto Basophil % : x    03-29    135  |  92<L>  |  45<H>  ----------------------------<  123<H>  3.7   |  27  |  1.41<H>  03-28    138  |  96  |  42<H>  ----------------------------<  112<H>  4.2   |  28  |  1.33<H>    Ca    9.2      29 Mar 2023 06:30  Ca    9.3      28 Mar 2023 06:56      TELEMETRY: Atrial fibrillation 's 	      Culture - Blood (03.25.23 @ 11:55)    -  Methicillin SENSITIVE Staphylococcus aureus (MSSA): Detec Any isolate of Staphylococcus aureus from a blood culture is NOT considered a contaminant.   Gram Stain:   Growth in anaerobic bottle: Gram Positive Cocci in Clusters   Specimen Source: .Blood   Organism: Blood Culture PCR   Culture Results:   Growth in anaerobic bottle: Staphylococcus aureus  ***Blood Panel PCR results on this specimen are available  approximately 3 hours after the Gram stain result.***  Gram stain, PCR, and/or culture results may not always  correspond due to difference in methodologies.  ************************************************************  This PCR assay was performed by multiplex PCR. This  Assay tests for 66 bacterial and resistance gene targets.  Please refer to the [facility] Labs test directory  at [email or web address] for details.   Organism Identification: Blood Culture PCR   Method Type: PCR           24H hour events: Patient seen at bedside on 3DSU. Plan for possible PPM extraction Thursday, 3/30. Continues on lasix drip with improving LE edema.     MEDICATIONS:  aspirin  chewable 81 milliGRAM(s) Oral daily  furosemide Infusion 7.5 mG/Hr IV Continuous <Continuous>  metoprolol succinate  milliGRAM(s) Oral daily  spironolactone 25 milliGRAM(s) Oral two times a day  ceFAZolin   IVPB 2000 milliGRAM(s) IV Intermittent every 8 hours  atorvastatin 40 milliGRAM(s) Oral at bedtime  levothyroxine 137 MICROGram(s) Oral daily      REVIEW OF SYSTEMS:  Complete 12point ROS negative.    PHYSICAL EXAM:  T(C): 37.1 (03-29-23 @ 04:33), Max: 37.7 (03-28-23 @ 11:30)  HR: 67 (03-29-23 @ 04:33) (67 - 97)  BP: 124/73 (03-29-23 @ 04:33) (104/62 - 124/73)  RR: 18 (03-29-23 @ 04:33) (18 - 18)  SpO2: 94% (03-29-23 @ 04:33) (93% - 98%)  Wt(kg): --  I&O's Summary    28 Mar 2023 07:01  -  29 Mar 2023 07:00  --------------------------------------------------------  IN: 910.5 mL / OUT: 4300 mL / NET: -3389.5 mL        Appearance: Normal	  HEENT: Normal oral mucosa, PERRL, EOMI	  Cardiovascular: Normal S1 S2, irregular, No JVD, No murmurs  Respiratory: Lungs clear to auscultation	  Psychiatry: A & O x 3, Mood & affect appropriate  Gastrointestinal:  Soft, Non-tender, + BS	  Skin: b/l LE pitting edema +1, R LE cellulitis, No ecchymoses, No cyanosis	  Extremities: limited range of motion in left LE, No clubbing, cyanosis   Vascular: Peripheral pulses palpable 2+ bilaterally        LABS:	 	    CBC Full  -  ( 29 Mar 2023 06:30 )  WBC Count : 9.51 K/uL  Hemoglobin : 10.6 g/dL  Hematocrit : 32.5 %  Platelet Count - Automated : 141 K/uL  Mean Cell Volume : 92.1 fl  Mean Cell Hemoglobin : 30.0 pg  Mean Cell Hemoglobin Concentration : 32.6 gm/dL  Auto Neutrophil # : x  Auto Lymphocyte # : x  Auto Monocyte # : x  Auto Eosinophil # : x  Auto Basophil # : x  Auto Neutrophil % : x  Auto Lymphocyte % : x  Auto Monocyte % : x  Auto Eosinophil % : x  Auto Basophil % : x    03-29    135  |  92<L>  |  45<H>  ----------------------------<  123<H>  3.7   |  27  |  1.41<H>  03-28    138  |  96  |  42<H>  ----------------------------<  112<H>  4.2   |  28  |  1.33<H>    Ca    9.2      29 Mar 2023 06:30  Ca    9.3      28 Mar 2023 06:56      TELEMETRY: Atrial fibrillation 's 	      Culture - Blood (03.25.23 @ 11:55)    -  Methicillin SENSITIVE Staphylococcus aureus (MSSA): Detec Any isolate of Staphylococcus aureus from a blood culture is NOT considered a contaminant.   Gram Stain:   Growth in anaerobic bottle: Gram Positive Cocci in Clusters   Specimen Source: .Blood   Organism: Blood Culture PCR   Culture Results:   Growth in anaerobic bottle: Staphylococcus aureus  ***Blood Panel PCR results on this specimen are available  approximately 3 hours after the Gram stain result.***  Gram stain, PCR, and/or culture results may not always  correspond due to difference in methodologies.  ************************************************************  This PCR assay was performed by multiplex PCR. This  Assay tests for 66 bacterial and resistance gene targets.  Please refer to the [facility] Labs test directory  at [email or web address] for details.   Organism Identification: Blood Culture PCR   Method Type: PCR

## 2023-03-29 NOTE — PROGRESS NOTE ADULT - ASSESSMENT
78 y/o male PMHx HTN, HLD, CAD s/p stent on ASA, A-fib, hypothyroid, CHF now presenting to the ED worsening SOB, DUNCAN, peripheral edema, scrotal edema and 20 pound unintentional weight gain over last month with MSSA bacteremia, HF    Kevin Olmos  Attending Physician   Division of Infectious Disease  Office #[phone]  Available on Microsoft Teams also  After 5pm/weekend or no response, call #[phone]

## 2023-03-29 NOTE — PROGRESS NOTE ADULT - SUBJECTIVE AND OBJECTIVE BOX
MARIELOS JANEE 77y [id number]    Patient is a 77y old  Male who presents with a chief complaint of leg  swelling (25 Mar 2023 17:39)      Follow Up/CC:  ID following for bacteremia    Interval History/ROS: no fever    Allergies    alfuzosin (Hives)  levofloxacin (Hives)  Myrbetriq (Hives)  tamsulosin (Hives)    Intolerances        ANTIMICROBIALS:  ceFAZolin   IVPB 2000 every 8 hours      MEDICATIONS  (STANDING):  aspirin  chewable 81 milliGRAM(s) Oral daily  atorvastatin 40 milliGRAM(s) Oral at bedtime  ceFAZolin   IVPB 2000 milliGRAM(s) IV Intermittent every 8 hours  furosemide Infusion 7.5 mG/Hr (3.75 mL/Hr) IV Continuous <Continuous>  heparin  Infusion 1200 Unit(s)/Hr (12 mL/Hr) IV Continuous <Continuous>  levothyroxine 137 MICROGram(s) Oral daily  metoprolol succinate  milliGRAM(s) Oral daily  spironolactone 25 milliGRAM(s) Oral two times a day    MEDICATIONS  (PRN):        Vital Signs Last 24 Hrs  T(C): 37.1 (29 Mar 2023 04:33), Max: 37.7 (28 Mar 2023 11:30)  T(F): 98.8 (29 Mar 2023 04:33), Max: 99.9 (28 Mar 2023 11:30)  HR: 67 (29 Mar 2023 04:33) (67 - 97)  BP: 124/73 (29 Mar 2023 04:33) (104/62 - 124/73)  BP(mean): --  RR: 18 (29 Mar 2023 04:33) (18 - 18)  SpO2: 94% (29 Mar 2023 04:33) (93% - 98%)    Parameters below as of 29 Mar 2023 04:33  Patient On (Oxygen Delivery Method): nasal cannula        CBC Full  -  ( 29 Mar 2023 06:30 )  WBC Count : 9.51 K/uL  RBC Count : 3.53 M/uL  Hemoglobin : 10.6 g/dL  Hematocrit : 32.5 %  Platelet Count - Automated : 141 K/uL  Mean Cell Volume : 92.1 fl  Mean Cell Hemoglobin : 30.0 pg  Mean Cell Hemoglobin Concentration : 32.6 gm/dL  Auto Neutrophil # : x  Auto Lymphocyte # : x  Auto Monocyte # : x  Auto Eosinophil # : x  Auto Basophil # : x  Auto Neutrophil % : x  Auto Lymphocyte % : x  Auto Monocyte % : x  Auto Eosinophil % : x  Auto Basophil % : x    03-29    135  |  92<L>  |  45<H>  ----------------------------<  123<H>  3.7   |  27  |  1.41<H>    Ca    9.2      29 Mar 2023 06:30            MICROBIOLOGY:  .Blood  03-25-23   Growth in anaerobic bottle: Staphylococcus aureus  ***Blood Panel PCR results on this specimen are available  approximately 3 hours after the Gram stain result.***  Gram stain, PCR, and/or culture results may not always  correspond due to difference in methodologies.  ************************************************************  This PCR assay was performed by multiplex PCR. This  Assay tests for 66 bacterial and resistance gene targets.  Please refer to the [facility] Labs test directory  at [email or web address] for details.  --  Blood Culture PCR      .Blood  03-25-23   No growth to date.  --  --              v    Rapid RVP Result: Citlali (03-25 @ 11:55)          RADIOLOGY

## 2023-03-29 NOTE — PROGRESS NOTE ADULT - PROBLEM SELECTOR PLAN 1
-only 1 out of 4 positive - still suspect this is real   -cont ancef  -await repeat bcx  -likely source is toe from recent nail clipping   -check TTE/ERENDIRA given PPM/AVR  -EP input noted  -also with low grade fevers

## 2023-03-29 NOTE — PROGRESS NOTE ADULT - ASSESSMENT
77  year old male    h/o  CAD s/p stent , asa.      A-fib/ PPM, , hypothyroid, and total left knee replacement    prior  PPM  interrogation  with  [facility]/   s/p  brief   bursts  of  afib/ , on prior visit   Ct chest, retrosternal hematoma.  mod left pl effusion    was  on bumex/  aldactone        *   admitted with    wprsening   pedal  edema        component  of  cellulitis   and  from  c/c  diastolic  chf,/        pt  admits to  being  non complaint   with  his meds       and  from  c/c   lymphedema,  carla left  leg      diuretics   and iv  ancef   *   h/o    c/c AFIB,   has  PPM         on eliquis     *    Anemia, , hb stable, had  been seen by  gi  eval dr joann sanchez in past     *  CAD,     cath, with 2 vessel disease,  s/p  CABG and  MVR  surg d r marni   *    h/o  Afib on    eliquis         c/c leg redness/  4 +  edema, on keflex, has  improved    *     echo,  on 7/2022,  ef  35/ new/ severe  TR          cath,   was non  obstructive    *  on synthroid          on asa/  torpol/  eliquis         crt is  1.3,  from  lasix// aldactone.  leg  swelling  is  lessening   *  MSSA  bacterinia       ID   eval        on iv ancef.  follwo  rpt bcx /   ppm  wa s interrogated   on iv lasix  infusion.  with less  edema         f/p wih  dr mikayla dick         rad< from: TTE with Doppler (w/Cont) (07.05.22 @ 09:42) >  Conclusions:  Endocardial visualization enhanced with intravenous  injection of Ultrasonic Enhancing Agent (Definity).  Severe left ventricular enlargement.  Estimated ejection fraction 35%. Diffuse hypokinesis, with  inferior akinesis.  Bioprosthetic mitral valve with normal function.  Right ventricular enlargement with decreased right  ventricular systolic function.  A device wire is noted in the right heart.  ------------------------------------------------------------------------  Confirmed on  7/5/2022 - 12:16:10 by Kristian    < end of copied text >

## 2023-03-29 NOTE — PROGRESS NOTE ADULT - SUBJECTIVE AND OBJECTIVE BOX
CARDIOLOGY     PROGRESS  NOTE   ________________________________________________    CHIEF COMPLAINT:Patient is a 77y old  Male who presents with a chief complaint of leg  swelling (25 Mar 2023 17:39)  doing better  	  REVIEW OF SYSTEMS:  CONSTITUTIONAL: No fever, weight loss, or fatigue  EYES: No eye pain, visual disturbances, or discharge  ENT:  No difficulty hearing, tinnitus, vertigo; No sinus or throat pain  NECK: No pain or stiffness  RESPIRATORY: No cough, wheezing, chills or hemoptysis; decrease  Shortness of Breath  CARDIOVASCULAR: No chest pain, palpitations, passing out, dizziness, + leg swelling  GASTROINTESTINAL: No abdominal or epigastric pain. No nausea, vomiting, or hematemesis; No diarrhea or constipation. No melena or hematochezia.  GENITOURINARY: No dysuria, frequency, hematuria, or incontinence  NEUROLOGICAL: No headaches, memory loss, loss of strength, numbness, or tremors  SKIN: No itching, burning, rashes, or lesions   LYMPH Nodes: No enlarged glands  ENDOCRINE: No heat or cold intolerance; No hair loss  MUSCULOSKELETAL: No joint pain or swelling; No muscle, back, or extremity pain  PSYCHIATRIC: No depression, anxiety, mood swings, or difficulty sleeping  HEME/LYMPH: No easy bruising, or bleeding gums  ALLERGY AND IMMUNOLOGIC: No hives or eczema	    [ ] All others negative	  [ ] Unable to obtain    PHYSICAL EXAM:  T(C): 37.1 (03-29-23 @ 04:33), Max: 37.7 (03-28-23 @ 11:30)  HR: 67 (03-29-23 @ 04:33) (67 - 97)  BP: 124/73 (03-29-23 @ 04:33) (104/62 - 124/73)  RR: 18 (03-29-23 @ 04:33) (18 - 18)  SpO2: 94% (03-29-23 @ 04:33) (93% - 98%)  Wt(kg): --  I&O's Summary    28 Mar 2023 07:01  -  29 Mar 2023 07:00  --------------------------------------------------------  IN: 910.5 mL / OUT: 4300 mL / NET: -3389.5 mL        Appearance: Normal	  HEENT:   Normal oral mucosa, PERRL, EOMI	  Lymphatic: No lymphadenopathy  Cardiovascular: Normal S1 S2, + JVD, + murmurs, No edema  Respiratory: rhonchi  Psychiatry: A & O x 3, Mood & affect appropriate  Gastrointestinal:  Soft, Non-tender, + BS	  Skin: No rashes, No ecchymoses, No cyanosis	  Neurologic: Non-focal  Extremities: Normal range of motion, No clubbing, cyanosis or edema  Vascular: Peripheral pulses palpable 2+ bilaterally    MEDICATIONS  (STANDING):  aspirin  chewable 81 milliGRAM(s) Oral daily  atorvastatin 40 milliGRAM(s) Oral at bedtime  ceFAZolin   IVPB 2000 milliGRAM(s) IV Intermittent every 8 hours  furosemide Infusion 7.5 mG/Hr (3.75 mL/Hr) IV Continuous <Continuous>  levothyroxine 137 MICROGram(s) Oral daily  metoprolol succinate  milliGRAM(s) Oral daily  spironolactone 25 milliGRAM(s) Oral two times a day      TELEMETRY: 	    ECG:  	  RADIOLOGY:  OTHER: 	  	  LABS:	 	    CARDIAC MARKERS:                                10.6   9.51  )-----------( 141      ( 29 Mar 2023 06:30 )             32.5     03-29    135  |  92<L>  |  45<H>  ----------------------------<  123<H>  3.7   |  27  |  1.41<H>    Ca    9.2      29 Mar 2023 06:30      proBNP:   Lipid Profile:   HgA1c:   TSH:   Culture - Blood (03.25.23 @ 11:55)    -  Methicillin SENSITIVE Staphylococcus aureus (MSSA): Detec Any isolate of Staphylococcus aureus from a blood culture is NOT considered a contaminant.   Gram Stain:   Growth in anaerobic bottle: Gram Positive Cocci in Clusters   Specimen Source: .Blood   Organism: Blood Culture PCR   Culture Results:   Growth in anaerobic bottle: Staphylococcus aureus  ***Blood Panel PCR results on this specimen are available  approximately 3 hours after the Gram stain result.***  Gram stain, PCR, and/or culture results may not always  correspond due to difference in methodologies.  ************************************************************  This PCR assay was performed by multiplex PCR. This  Assay tests for 66 bacterial and resistance gene targets.  Please refer to the [facility] Labs test directory  at [email or web address] for details.   Organism Identification: Blood Culture PCR   Method Type: PCR    Culture - Blood (03.25.23 @ 11:40)    Specimen Source: .Blood   Culture Results:   No growth to date.          Assessment and plan  ---------------------------  77y m pmh BPH, Chronic venous insufficiency, HLD Hypothyroidism, S/P CABG x 2, S/P mitral valve replacement, SP Status post angioplasty, Sp TKR rt, Anasarca Pt comes to ed c/o shortness of breath for past month w progressive worsening of anasarca w swelling of scrotum and diff urinating, Has been noncompliant on diuretics for past two weeks w 20lb weight gain over past month. No fever and chills, sputum, nvdc, cp. PE Adult male lying stretcher w maked swelling to josh lower extr, w pitting edema,  chest  scant josh crackles w slight wheeze and prolonged exp phase  chf acute on chronic sec to RV dysfunction  start on lasix drip, strict i/o  fu lytes  tele, AFib check HR  a,fib , continue AC  will adjust cardiac meds  add aldactone 25 mg bid  pt still sig fluid overloaded, increase drip to 10 mg  fu lytes  +BC i bottle, MSSA , will discuss with ID one out of two POS   will consider ppm explant  awaiting echo/ continue diuresis  will increase aldactone dose as tolertaed  abx as per ID  start on AC sec to a.fib

## 2023-03-29 NOTE — PROGRESS NOTE ADULT - ASSESSMENT
77 y.o. male with PMHX significant for Permanent AFib for 6-7 years (On Eliquis), CAD S/P CABG x 2 2021, S/P bioprosthetic mitral valve replacement, SALONI 2007, S/p right TKR, BPH, Chronic venous insufficiency, HLD, HTN, Hypothyroidism, varicose veins with stripping, infection of right foot 2019, Left knee replacement with multiple infections post op, Medtronic dual chamber PPM 2012 (Gen Change in 2021).  Pt comes to ED with c/o shortness of breath for past month with progressive worsening of anasarca with swelling of scrotum and difficulty with urinating.  Admits to noncompliance with diuretics for past two weeks with 25lb weight gain over past month.  Patient states that he had recent toe nail clipping by Podiatrist approximately 1 week prior to admission.  He states that his toe has had pain and bleeding since nail clipping with RLE erythema.  Denies fevers at home but states he had fever Saturday of 103.  Admits to improvement in lower extremity edema since admission.     Medtronic dual chamber PPM (9/2021)  Atrial Lead: [id number] (Implanted 1/10/2012)  RV Lead: [id number] (Implanted 1/10/2012)    1.  MSSA Bacteremia  2. HFrEF exacerbation with EF 35%  3. Permanent AFib     - s/p pacemaker interrogation with normal device function. Patient is in AFib, not pacer dependent.  VPaced 35.7%.  Time in AT/%.  See interrogation note for full device details.  - Hold anticoagulation Eliquis for possible pacemaker extraction Thursday 3/30   - Continue on IV Ancef per ID.  Follow up cultures  - Current on IV Lasix drip, monitor intake and output, weights   - Will continue to follow closely     NATY Dillon [facility]  [id number]  77 y.o. male with PMHX significant for Permanent AFib for 6-7 years (On Eliquis), CAD S/P CABG x 2 2021, S/P bioprosthetic mitral valve replacement, SALONI 2007, S/p right TKR, BPH, Chronic venous insufficiency, HLD, HTN, Hypothyroidism, varicose veins with stripping, infection of right foot 2019, Left knee replacement with multiple infections post op, Medtronic dual chamber PPM 2012 (Gen Change in 2021).  Pt comes to ED with c/o shortness of breath for past month with progressive worsening of anasarca with swelling of scrotum and difficulty with urinating.  Admits to noncompliance with diuretics for past two weeks with 25lb weight gain over past month.  Patient states that he had recent toe nail clipping by Podiatrist approximately 1 week prior to admission.  He states that his toe has had pain and bleeding since nail clipping with RLE erythema.  Denies fevers at home but states he had fever Saturday of 103.  Admits to improvement in lower extremity edema since admission.     Medtronic dual chamber PPM (9/2021)  Atrial Lead: [id number] (Implanted 1/10/2012)  RV Lead: [id number] (Implanted 1/10/2012)    1.  MSSA Bacteremia  2. HFrEF exacerbation with EF 35%  3. Permanent AFib     - s/p pacemaker interrogation with normal device function. Patient is in AFib, not pacer dependent.  VPaced 35.7%.  Time in AT/%.  See interrogation note for full device details.  - Hold anticoagulation Eliquis  - NPO after midnight for pacer extraction and micra implant 3/30  - type and screen x1 and covid PCR ordered   - Continue on IV Ancef per ID.  Follow up cultures  - Current on IV Lasix drip, monitor intake and output, weights   - Will continue to follow closely     NATY Dillon [facility]  [id number]  77 y.o. male with PMHX significant for Permanent AFib for 6-7 years (On Eliquis), CAD S/P CABG x 2 2021, S/P bioprosthetic mitral valve replacement, SALONI 2007, S/p right TKR, BPH, Chronic venous insufficiency, HLD, HTN, Hypothyroidism, varicose veins with stripping, infection of right foot 2019, Left knee replacement with multiple infections post op, Medtronic dual chamber PPM 2012 (Gen Change in 2021).  Pt comes to ED with c/o shortness of breath for past month with progressive worsening of anasarca with swelling of scrotum and difficulty with urinating.  Admits to noncompliance with diuretics for past two weeks with 25lb weight gain over past month.  Patient states that he had recent toe nail clipping by Podiatrist approximately 1 week prior to admission.  He states that his toe has had pain and bleeding since nail clipping with RLE erythema.  Denies fevers at home but states he had fever Saturday of 103.  Admits to improvement in lower extremity edema since admission.     Medtronic dual chamber PPM (9/2021)  Atrial Lead: [id number] (Implanted 1/10/2012)  RV Lead: [id number] (Implanted 1/10/2012)    1. MSSA Bacteremia, admitted with fever and leukocytosis   2. HFrEF exacerbation with EF 35%  3. Permanent AFib     - Patient is in AFib, not pacer dependent.  VPaced 35.7%.  Time in AT/%.  - Continue to hold anticoagulation Eliquis.  Started on IV Heparin today, please hold at 0600 on 3/30/23   - NPO after midnight for pacemaker extraction with micra implant 3/30  - type and screen x1 and covid PCR ordered   - Continue on IV Ancef per ID.  Follow up cultures  - Currently on IV Lasix drip, monitor intake and output, weights   - Discussed with Primary Team and ID     NATY Dillon [facility]  [id number]

## 2023-03-29 NOTE — PROGRESS NOTE ADULT - ATTENDING COMMENTS
seen and agree  plan for extraction/leadless PPM placement  Discussed with patient and consent obtained

## 2023-03-29 NOTE — PROGRESS NOTE ADULT - SUBJECTIVE AND OBJECTIVE BOX
afberile  REVIEW OF SYSTEMS:  CONSTITUTIONAL: No fever,  no  weight loss  ENT:  No  tinnitus,   no   vertigo  NECK: No pain or stiffness  RESPIRATORY: No cough, wheezing, chills or hemoptysis;    No Shortness of Breath  CARDIOVASCULAR: No chest pain, palpitations, dizziness  GASTROINTESTINAL: No abdominal or epigastric pain. No nausea, vomiting, or hematemesis; No diarrhea  No melena or hematochezia.  GENITOURINARY: No dysuria, frequency, hematuria, or incontinence  NEUROLOGICAL: No headaches  SKIN: No itching,  no   rash  LYMPH Nodes: No enlarged glands  ENDOCRINE: No heat or cold intolerance  MUSCULOSKELETAL: No joint pain or swelling  PSYCHIATRIC: No depression, anxiety  HEME/LYMPH: No easy bruising, or bleeding gums  ALLERGY AND IMMUNOLOGIC: No hives or eczema	    MEDICATIONS  (STANDING):  aspirin  chewable 81 milliGRAM(s) Oral daily  atorvastatin 40 milliGRAM(s) Oral at bedtime  ceFAZolin   IVPB 2000 milliGRAM(s) IV Intermittent every 8 hours  furosemide Infusion 7.5 mG/Hr (3.75 mL/Hr) IV Continuous <Continuous>  levothyroxine 137 MICROGram(s) Oral daily  metoprolol succinate  milliGRAM(s) Oral daily  spironolactone 25 milliGRAM(s) Oral two times a day    MEDICATIONS  (PRN):      Vital Signs Last 24 Hrs  T(C): 37.1 (29 Mar 2023 04:33), Max: 37.7 (28 Mar 2023 11:30)  T(F): 98.8 (29 Mar 2023 04:33), Max: 99.9 (28 Mar 2023 11:30)  HR: 67 (29 Mar 2023 04:33) (67 - 97)  BP: 124/73 (29 Mar 2023 04:33) (104/62 - 124/73)  BP(mean): --  RR: 18 (29 Mar 2023 04:33) (18 - 18)  SpO2: 94% (29 Mar 2023 04:33) (93% - 98%)    Parameters below as of 29 Mar 2023 04:33  Patient On (Oxygen Delivery Method): nasal cannula      CAPILLARY BLOOD GLUCOSE        I&O's Summary    28 Mar 2023 07:01  -  29 Mar 2023 07:00  --------------------------------------------------------  IN: 910.5 mL / OUT: 4300 mL / NET: -3389.5 mL          Appearance: Normal	  HEENT:   Normal oral mucosa, PERRL, EOMI	  Lymphatic: No lymphadenopathy  Cardiovascular: Normal S1 S2, No JVD  Respiratory: Lungs clear to auscultation	  Psychiatry: A & O x 3, Mood & affect appropriate  Gastrointestinal:  Soft, Non-tender, + BS	  Skin: No rash, No ecchymoses	  Extremities: Normal range of motion  Vascular: Peripheral pulses palpable bilaterally    LABS:                        10.6   9.51  )-----------( 141      ( 29 Mar 2023 06:30 )             32.5     03-29    135  |  92<L>  |  45<H>  ----------------------------<  123<H>  3.7   |  27  |  1.41<H>    Ca    9.2      29 Mar 2023 06:30                              Consultant(s) Notes Reviewed:      Care Discussed with Consultants/Other Providers:

## 2023-03-30 ENCOUNTER — APPOINTMENT (OUTPATIENT)
Dept: OPHTHALMOLOGY | Facility: CLINIC | Age: 78
End: 2023-03-30

## 2023-03-30 LAB
ANION GAP SERPL CALC-SCNC: 12 MMOL/L — SIGNIFICANT CHANGE UP (ref 5–17)
APTT BLD: 39.9 SEC — HIGH (ref 27.5–35.5)
BUN SERPL-MCNC: 45 MG/DL — HIGH (ref 7–23)
CALCIUM SERPL-MCNC: 9.5 MG/DL — SIGNIFICANT CHANGE UP (ref 8.4–10.5)
CHLORIDE SERPL-SCNC: 91 MMOL/L — LOW (ref 96–108)
CO2 SERPL-SCNC: 35 MMOL/L — HIGH (ref 22–31)
CREAT SERPL-MCNC: 1.15 MG/DL — SIGNIFICANT CHANGE UP (ref 0.5–1.3)
EGFR: 66 ML/MIN/1.73M2 — SIGNIFICANT CHANGE UP
GLUCOSE SERPL-MCNC: 167 MG/DL — HIGH (ref 70–99)
HCT VFR BLD CALC: 32.5 % — LOW (ref 39–50)
HGB BLD-MCNC: 10.5 G/DL — LOW (ref 13–17)
MCHC RBC-ENTMCNC: 29.7 PG — SIGNIFICANT CHANGE UP (ref 27–34)
MCHC RBC-ENTMCNC: 32.3 GM/DL — SIGNIFICANT CHANGE UP (ref 32–36)
MCV RBC AUTO: 92.1 FL — SIGNIFICANT CHANGE UP (ref 80–100)
NRBC # BLD: 0 /100 WBCS — SIGNIFICANT CHANGE UP (ref 0–0)
PLATELET # BLD AUTO: 194 K/UL — SIGNIFICANT CHANGE UP (ref 150–400)
POTASSIUM SERPL-MCNC: 3.3 MMOL/L — LOW (ref 3.5–5.3)
POTASSIUM SERPL-SCNC: 3.3 MMOL/L — LOW (ref 3.5–5.3)
RBC # BLD: 3.53 M/UL — LOW (ref 4.2–5.8)
RBC # FLD: 13.8 % — SIGNIFICANT CHANGE UP (ref 10.3–14.5)
SODIUM SERPL-SCNC: 138 MMOL/L — SIGNIFICANT CHANGE UP (ref 135–145)
WBC # BLD: 9.76 K/UL — SIGNIFICANT CHANGE UP (ref 3.8–10.5)
WBC # FLD AUTO: 9.76 K/UL — SIGNIFICANT CHANGE UP (ref 3.8–10.5)

## 2023-03-30 PROCEDURE — 99233 SBSQ HOSP IP/OBS HIGH 50: CPT

## 2023-03-30 PROCEDURE — 93010 ELECTROCARDIOGRAM REPORT: CPT

## 2023-03-30 PROCEDURE — 99232 SBSQ HOSP IP/OBS MODERATE 35: CPT

## 2023-03-30 DEVICE — IMPLANTABLE DEVICE
Type: IMPLANTABLE DEVICE | Status: NON-FUNCTIONAL
Removed: 2023-03-30

## 2023-03-30 DEVICE — SHEATH TIGHTRAIL ROTATING DIL 13FR
Type: IMPLANTABLE DEVICE | Status: NON-FUNCTIONAL
Removed: 2023-03-30

## 2023-03-30 DEVICE — KIT BRIDGE ACESSORY
Type: IMPLANTABLE DEVICE | Status: NON-FUNCTIONAL
Removed: 2023-03-30

## 2023-03-30 DEVICE — DEVICE LOCKING LOCKING LLD EZ CLEARS
Type: IMPLANTABLE DEVICE | Status: NON-FUNCTIONAL
Removed: 2023-03-30

## 2023-03-30 DEVICE — KIT A-LINE 1LUM 20G X 12CM SAFE KIT
Type: IMPLANTABLE DEVICE | Status: NON-FUNCTIONAL
Removed: 2023-03-30

## 2023-03-30 DEVICE — SHEATH GLIDELIGHT LASER 16FR
Type: IMPLANTABLE DEVICE | Status: NON-FUNCTIONAL
Removed: 2023-03-30

## 2023-03-30 DEVICE — GUIDEWIRE AMPLATZ SUPER-STIFF 3MM J .035" X 145CM
Type: IMPLANTABLE DEVICE | Status: NON-FUNCTIONAL
Removed: 2023-03-30

## 2023-03-30 RX ORDER — HEPARIN SODIUM 5000 [USP'U]/ML
1700 INJECTION INTRAVENOUS; SUBCUTANEOUS
Qty: 25000 | Refills: 0 | Status: DISCONTINUED | OUTPATIENT
Start: 2023-03-30 | End: 2023-03-30

## 2023-03-30 RX ORDER — ACETAMINOPHEN 500 MG
650 TABLET ORAL EVERY 6 HOURS
Refills: 0 | Status: DISCONTINUED | OUTPATIENT
Start: 2023-03-30 | End: 2023-05-01

## 2023-03-30 RX ORDER — POTASSIUM CHLORIDE 20 MEQ
40 PACKET (EA) ORAL ONCE
Refills: 0 | Status: COMPLETED | OUTPATIENT
Start: 2023-03-30 | End: 2023-03-30

## 2023-03-30 RX ORDER — OXYCODONE HYDROCHLORIDE 5 MG/1
5 TABLET ORAL EVERY 6 HOURS
Refills: 0 | Status: DISCONTINUED | OUTPATIENT
Start: 2023-03-30 | End: 2023-04-05

## 2023-03-30 RX ADMIN — Medication 81 MILLIGRAM(S): at 22:18

## 2023-03-30 RX ADMIN — Medication 100 MILLIGRAM(S): at 06:34

## 2023-03-30 RX ADMIN — Medication 40 MILLIEQUIVALENT(S): at 12:36

## 2023-03-30 RX ADMIN — SPIRONOLACTONE 25 MILLIGRAM(S): 25 TABLET, FILM COATED ORAL at 22:18

## 2023-03-30 RX ADMIN — ATORVASTATIN CALCIUM 40 MILLIGRAM(S): 80 TABLET, FILM COATED ORAL at 22:18

## 2023-03-30 RX ADMIN — CHLORHEXIDINE GLUCONATE 1 APPLICATION(S): 213 SOLUTION TOPICAL at 07:22

## 2023-03-30 RX ADMIN — SPIRONOLACTONE 25 MILLIGRAM(S): 25 TABLET, FILM COATED ORAL at 06:34

## 2023-03-30 RX ADMIN — Medication 100 MILLIGRAM(S): at 22:24

## 2023-03-30 RX ADMIN — Medication 137 MICROGRAM(S): at 06:34

## 2023-03-30 RX ADMIN — Medication 100 MILLIGRAM(S): at 07:00

## 2023-03-30 NOTE — PROGRESS NOTE ADULT - SUBJECTIVE AND OBJECTIVE BOX
ARCEO JANEE 77y [id number]    Patient is a 77y old  Male who presents with a chief complaint of leg  swelling (25 Mar 2023 17:39)      Follow Up/CC:  ID following for bacteremia    Interval History/ROS: no fever, for OR    Allergies    alfuzosin (Hives)  levofloxacin (Hives)  Myrbetriq (Hives)  tamsulosin (Hives)    Intolerances        ANTIMICROBIALS:  ceFAZolin   IVPB 2000 every 8 hours      MEDICATIONS  (STANDING):  aspirin  chewable 81 milliGRAM(s) Oral daily  atorvastatin 40 milliGRAM(s) Oral at bedtime  ceFAZolin   IVPB 2000 milliGRAM(s) IV Intermittent every 8 hours  levothyroxine 137 MICROGram(s) Oral daily  metoprolol succinate  milliGRAM(s) Oral daily  spironolactone 25 milliGRAM(s) Oral two times a day    MEDICATIONS  (PRN):        Vital Signs Last 24 Hrs  T(C): 37.2 (30 Mar 2023 12:48), Max: 37.6 (30 Mar 2023 00:37)  T(F): 99 (30 Mar 2023 12:48), Max: 99.7 (30 Mar 2023 05:04)  HR: 76 (30 Mar 2023 12:48) (72 - 109)  BP: 113/61 (30 Mar 2023 12:48) (104/65 - 113/61)  BP(mean): --  RR: 18 (30 Mar 2023 12:48) (18 - 18)  SpO2: 95% (30 Mar 2023 12:48) (91% - 95%)    Parameters below as of 30 Mar 2023 11:38  Patient On (Oxygen Delivery Method): room air        CBC Full  -  ( 30 Mar 2023 06:46 )  WBC Count : 9.76 K/uL  RBC Count : 3.53 M/uL  Hemoglobin : 10.5 g/dL  Hematocrit : 32.5 %  Platelet Count - Automated : 194 K/uL  Mean Cell Volume : 92.1 fl  Mean Cell Hemoglobin : 29.7 pg  Mean Cell Hemoglobin Concentration : 32.3 gm/dL  Auto Neutrophil # : x  Auto Lymphocyte # : x  Auto Monocyte # : x  Auto Eosinophil # : x  Auto Basophil # : x  Auto Neutrophil % : x  Auto Lymphocyte % : x  Auto Monocyte % : x  Auto Eosinophil % : x  Auto Basophil % : x    03-30    138  |  91<L>  |  45<H>  ----------------------------<  167<H>  3.3<L>   |  35<H>  |  1.15    Ca    9.5      30 Mar 2023 06:46            MICROBIOLOGY:  .Blood Blood-Peripheral  03-28-23   No growth to date.  --  --      .Blood  03-25-23   Growth in anaerobic bottle: Staphylococcus aureus  ***Blood Panel PCR results on this specimen are available  approximately 3 hours after the Gram stain result.***  Gram stain, PCR, and/or culture results may not always  correspond due to difference in methodologies.  ************************************************************  This PCR assay was performed by multiplex PCR. This  Assay tests for 66 bacterial and resistance gene targets.  Please refer to the [facility] test directory  at [email or web address] for details.  --  Blood Culture PCR  Staphylococcus aureus      .Blood  03-25-23   No growth to date.  --  --              v    Rapid RVP Result: Citlali (03-25 @ 11:55)          RADIOLOGY

## 2023-03-30 NOTE — PROGRESS NOTE ADULT - NEUROLOGICAL
Hospital day 2 for this antepartum patient  Admitted with chronic abruption  Minimal dark spotting overnight, no pain,  + ctx per pt overnight,  infrequent ctx on monitor  Cat 1 tracing        PMH:  No past medical history on file  PSH:  Past Surgical History:   Procedure Laterality Date     SECTION           Meds:  No current facility-administered medications on file prior to encounter  Current Outpatient Medications on File Prior to Encounter   Medication Sig Dispense Refill    Ascorbic Acid (vitamin C) 1000 MG tablet Take 1 tablet (1,000 mg total) by mouth 2 (two) times a day 60 tablet 0    cholecalciferol (VITAMIN D3) 1,000 units tablet Take 2 tablets (2,000 Units total) by mouth daily 60 tablet 0    Multiple Vitamin (multivitamin) tablet Take 1 tablet by mouth daily 30 tablet 0       Allergies:  No Known Allergies    Physical Exam:  /59 (BP Location: Right arm)   Pulse 88   Temp (!) 97 2 °F (36 2 °C) (Oral)   Resp 18   Ht 5' (1 524 m)   Wt 76 7 kg (169 lb)   LMP 2020 (Exact Date)   SpO2 98%   BMI 33 01 kg/m²     Abdomen soft nontender, appropriate for gestational age no contractions fht cat 1  Heart RRR  Lungs clear  Ext without edema, bernard's or familia' signs,   Neuro  a/a/o x 3            Assessment: /Plan    Chronic abruption at 33/6 weeks with fibrinogen trending down but still wnl    US with retroplacental clot  Cat 1 tracing  S/P BMZ, continuous monitoring   For formal US with MFM today details…

## 2023-03-30 NOTE — PROGRESS NOTE ADULT - ASSESSMENT
77  year old male    h/o  CAD s/p stent , asa.      A-fib/ PPM, , hypothyroid, and total left knee replacement    prior  PPM  interrogation  with  [facility]/   s/p  brief   bursts  of  afib/ , on prior visit   Ct chest, retrosternal hematoma.  mod left pl effusion    was  on bumex/  aldactone        *   admitted with    wprsening   pedal  edema        component  of  cellulitis   and  from  c/c  diastolic  chf,/        pt  admits to  being  non complaint   with  his meds       and  from  c/c   lymphedema,  carla left  leg      diuretics   and iv  ancef   *   h/o    c/c AFIB,   has  PPM         on eliquis     *    Anemia, , hb stable, had  been seen by  gi  eval dr joann sanchez in past     *  CAD,     cath, with 2 vessel disease,  s/p  CABG and  MVR  surg d r marni   *    h/o  Afib on    eliquis         c/c leg redness/  4 +  edema, on keflex, has  improved    *     echo,  on 7/2022,  ef  35/ new/ severe  TR          cath,   was non  obstructive    *  on synthroid          on asa/  torpol/  eliquis         crt is  1.3,  from  lasix// aldactone.  leg  swelling  is  lessening   *  MSSA  bacterinia         on iv ancef.  ,  rpt bcx /   are  negative /   ppm  wa s interrogated     a/c  pe r card  for afib/ ?  explant pf  ppm       f/p wih  dr mikayla dick         rad< from: TTE with Doppler (w/Cont) (07.05.22 @ 09:42) >  Conclusions:  Endocardial visualization enhanced with intravenous  injection of Ultrasonic Enhancing Agent (Definity).  Severe left ventricular enlargement.  Estimated ejection fraction 35%. Diffuse hypokinesis, with  inferior akinesis.  Bioprosthetic mitral valve with normal function.  Right ventricular enlargement with decreased right  ventricular systolic function.  A device wire is noted in the right heart.  ------------------------------------------------------------------------  Confirmed on  7/5/2022 - 12:16:10 by Kristian    < end of copied text >

## 2023-03-30 NOTE — PROGRESS NOTE ADULT - SUBJECTIVE AND OBJECTIVE BOX
24H hour events: Patient seen at bedside on [facility]. Consented and prepared for pacemaker extraction and Micra implant today, 3/30.     MEDICATIONS:  aspirin  chewable 81 milliGRAM(s) Oral daily  heparin  Infusion 1300 Unit(s)/Hr IV Continuous <Continuous>  metoprolol succinate  milliGRAM(s) Oral daily  spironolactone 25 milliGRAM(s) Oral two times a day  ceFAZolin   IVPB 2000 milliGRAM(s) IV Intermittent every 8 hours  atorvastatin 40 milliGRAM(s) Oral at bedtime  levothyroxine 137 MICROGram(s) Oral daily      REVIEW OF SYSTEMS:  Complete 12point ROS negative.    PHYSICAL EXAM:  T(C): 37.6 (03-30-23 @ 05:04), Max: 37.6 (03-30-23 @ 00:37)  HR: 109 (03-30-23 @ 05:04) (72 - 109)  BP: 108/58 (03-30-23 @ 05:04) (104/65 - 110/65)  RR: 18 (03-30-23 @ 05:04) (18 - 18)  SpO2: 91% (03-30-23 @ 05:04) (91% - 95%)  Wt(kg): --  I&O's Summary    29 Mar 2023 07:01  -  30 Mar 2023 07:00  --------------------------------------------------------  IN: 1422.5 mL / OUT: 4500 mL / NET: -3077.5 mL        Appearance: Normal	  HEENT: Normal oral mucosa, PERRL, EOMI	  Cardiovascular: Normal S1 S2, irregular, No JVD, No murmurs  Respiratory: Lungs diminished to auscultation	  Psychiatry: A & O x 3, Mood & affect appropriate  Gastrointestinal:  Soft, Non-tender, + BS	  Skin: Right LE cellulitis, b/l LE chronic venous stasis, L LE with gross non-pitting edema from thigh to foot. No ecchymoses, No cyanosis	  Neurologic: Non-focal  Extremities: Limited range of motion to left LE, No clubbing, cyanosis   Vascular: Peripheral pulses palpable 2+ bilaterally        LABS:	 	    CBC Full  -  ( 30 Mar 2023 06:46 )  WBC Count : 9.76 K/uL  Hemoglobin : 10.5 g/dL  Hematocrit : 32.5 %  Platelet Count - Automated : 194 K/uL  Mean Cell Volume : 92.1 fl  Mean Cell Hemoglobin : 29.7 pg  Mean Cell Hemoglobin Concentration : 32.3 gm/dL  Auto Neutrophil # : x  Auto Lymphocyte # : x  Auto Monocyte # : x  Auto Eosinophil # : x  Auto Basophil # : x  Auto Neutrophil % : x  Auto Lymphocyte % : x  Auto Monocyte % : x  Auto Eosinophil % : x  Auto Basophil % : x    03-30    138  |  91<L>  |  45<H>  ----------------------------<  167<H>  3.3<L>   |  35<H>  |  1.15  03-29    135  |  92<L>  |  48<H>  ----------------------------<  183<H>  3.6   |  36<H>  |  1.25    Ca    9.5      30 Mar 2023 06:46  Ca    9.6      29 Mar 2023 21:25      TELEMETRY: Atrial fib 's	      Specimen Source: .Blood Blood-Peripheral (03.28.23 @ 07:03)  Culture Results:   No growth to date. (03.28.23 @ 07:03)

## 2023-03-30 NOTE — PROGRESS NOTE ADULT - ASSESSMENT
77 y.o. male with PMHX significant for Permanent AFib for 6-7 years (On Eliquis), CAD S/P CABG x 2 2021, S/P bioprosthetic mitral valve replacement, SALONI 2007, S/p right TKR, BPH, Chronic venous insufficiency, HLD, HTN, Hypothyroidism, varicose veins with stripping, infection of right foot 2019, Left knee replacement with multiple infections post op, Medtronic dual chamber PPM 2012 (Gen Change in 2021).  Pt comes to ED with c/o shortness of breath for past month with progressive worsening of anasarca with swelling of scrotum and difficulty with urinating.  Admits to noncompliance with diuretics for past two weeks with 25lb weight gain over past month.  Patient states that he had recent toe nail clipping by Podiatrist approximately 1 week prior to admission.  He states that his toe has had pain and bleeding since nail clipping with RLE erythema.  Denies fevers at home but states he had fever Saturday of 103.  Admits to improvement in lower extremity edema since admission.     Medtronic dual chamber PPM (9/2021)  Atrial Lead: [id number] (Implanted 1/10/2012)  RV Lead: [id number] (Implanted 1/10/2012)    1. MSSA Bacteremia, admitted with fever and leukocytosis   2. HFrEF exacerbation with EF 35%  3. Permanent AFib     - NPO since midnight for pacemaker extraction with micra implant today, 3/30  - Patient is in AFib, not pacer dependent.  VPaced 35.7%.  Time in AT/%.  - Eliquis on hold, heparin gtt on hold   - type and screen x1 and covid PCR active  - Continue on IV Ancef per ID. 1/4 culture positive for staph aureus, follow up repeat cultures  - Currently on IV Lasix drip, monitor intake and output, weights   - Discussed with Primary Team      77 y.o. male with PMHX significant for Permanent AFib for 6-7 years (On Eliquis), CAD S/P CABG x 2 2021, S/P bioprosthetic mitral valve replacement, SALONI 2007, S/p right TKR, BPH, Chronic venous insufficiency, HLD, HTN, Hypothyroidism, varicose veins with stripping, infection of right foot 2019, Left knee replacement with multiple infections post op, Medtronic dual chamber PPM 2012 (Gen Change in 2021).  Pt comes to ED with c/o shortness of breath for past month with progressive worsening of anasarca with swelling of scrotum and difficulty with urinating.  Admits to noncompliance with diuretics for past two weeks with 25lb weight gain over past month.  Patient states that he had recent toe nail clipping by Podiatrist approximately 1 week prior to admission.  He states that his toe has had pain and bleeding since nail clipping with RLE erythema.  Denies fevers at home but states he had fever Saturday of 103.  Admits to improvement in lower extremity edema since admission.     Medtronic dual chamber PPM (9/2021)  Atrial Lead: 5086 (Implanted 1/10/2012)  RV Lead: [id number] (Implanted 1/10/2012)    1. MSSA Bacteremia, admitted with fever and leukocytosis   2. HFrEF exacerbation with EF 35%  3. Permanent AFib     - NPO since midnight for pacemaker extraction with micra implant today, 3/30  - Patient is in AFib, not pacer dependent.  VPaced 35.7%.  Time in AT/%.  - Eliquis on hold, heparin gtt on hold. EP to guide resuming A/C post procedure. Avoid Heparin/Lovenox SQ to prevent pocket hematoma   - type and screen and covid PCR active  - Continue on IV Ancef per ID. 1/4 culture positive for staph aureus, follow up repeat cultures/ID recs   - Currently on IV Lasix drip, monitor intake and output, weights   - Discussed with Primary Team

## 2023-03-30 NOTE — PROGRESS NOTE ADULT - ASSESSMENT
78 y/o male PMHx HTN, HLD, CAD s/p stent on ASA, A-fib, hypothyroid, CHF now presenting to the ED worsening SOB, DUNCAN, peripheral edema, scrotal edema and 20 pound unintentional weight gain over last month with MSSA bacteremia, HF    Kevin Olmos  Attending Physician   Division of Infectious Disease  Office #[phone]  Available on Microsoft Teams also  After 5pm/weekend or no response, call #[phone]

## 2023-03-30 NOTE — PROGRESS NOTE ADULT - SUBJECTIVE AND OBJECTIVE BOX
afebrile    REVIEW OF SYSTEMS:  CONSTITUTIONAL: No fever,  no  weight loss  ENT:  No  tinnitus,   no   vertigo  NECK: No pain or stiffness  RESPIRATORY: No cough, wheezing, chills or hemoptysis;    No Shortness of Breath  CARDIOVASCULAR: No chest pain, palpitations, dizziness  GASTROINTESTINAL: No abdominal or epigastric pain. No nausea, vomiting, or hematemesis; No diarrhea  No melena or hematochezia.  GENITOURINARY: No dysuria, frequency, hematuria, or incontinence  NEUROLOGICAL: No headaches  SKIN: No itching,  no   rash  LYMPH Nodes: No enlarged glands  ENDOCRINE: No heat or cold intolerance  MUSCULOSKELETAL: No joint pain or swelling  PSYCHIATRIC: No depression, anxiety  HEME/LYMPH: No easy bruising, or bleeding gums  ALLERGY AND IMMUNOLOGIC: No hives or eczema	    MEDICATIONS  (STANDING):  aspirin  chewable 81 milliGRAM(s) Oral daily  atorvastatin 40 milliGRAM(s) Oral at bedtime  ceFAZolin   IVPB 2000 milliGRAM(s) IV Intermittent every 8 hours  levothyroxine 137 MICROGram(s) Oral daily  metoprolol succinate  milliGRAM(s) Oral daily  potassium chloride    Tablet ER 40 milliEquivalent(s) Oral once  spironolactone 25 milliGRAM(s) Oral two times a day    MEDICATIONS  (PRN):      Vital Signs Last 24 Hrs  T(C): 37.6 (30 Mar 2023 05:04), Max: 37.6 (30 Mar 2023 00:37)  T(F): 99.7 (30 Mar 2023 05:04), Max: 99.7 (30 Mar 2023 05:04)  HR: 109 (30 Mar 2023 05:04) (72 - 109)  BP: 108/58 (30 Mar 2023 05:04) (104/65 - 110/65)  BP(mean): --  RR: 18 (30 Mar 2023 05:04) (18 - 18)  SpO2: 91% (30 Mar 2023 05:04) (91% - 95%)    Parameters below as of 30 Mar 2023 05:04  Patient On (Oxygen Delivery Method): nasal cannula      CAPILLARY BLOOD GLUCOSE        I&O's Summary    29 Mar 2023 07:01  -  30 Mar 2023 07:00  --------------------------------------------------------  IN: 1422.5 mL / OUT: 4500 mL / NET: -3077.5 mL          Appearance: Normal	  HEENT:   Normal oral mucosa, PERRL, EOMI	  Lymphatic: No lymphadenopathy  Cardiovascular: Normal S1 S2, No JVD  Respiratory: Lungs clear to auscultation	  Psychiatry: A & O x 3, Mood & affect appropriate  Gastrointestinal:  Soft, Non-tender, + BS	  Skin: No rash, No ecchymoses	  Extremities: Normal range of motion  Vascular: Peripheral pulses palpable bilaterally    c/c  L  l/edema    LABS:                        10.5   9.76  )-----------( 194      ( 30 Mar 2023 06:46 )             32.5     03-30    138  |  91<L>  |  45<H>  ----------------------------<  167<H>  3.3<L>   |  35<H>  |  1.15    Ca    9.5      30 Mar 2023 06:46      PTT - ( 30 Mar 2023 06:46 )  PTT:39.9 sec                        Consultant(s) Notes Reviewed:      Care Discussed with Consultants/Other Providers:

## 2023-03-30 NOTE — PROGRESS NOTE ADULT - SUBJECTIVE AND OBJECTIVE BOX
CARDIOLOGY     PROGRESS  NOTE   ________________________________________________    CHIEF COMPLAINT:Patient is a 77y old  Male who presents with a chief complaint of leg  swelling (25 Mar 2023 17:39)  no complain  	  REVIEW OF SYSTEMS:  CONSTITUTIONAL: No fever, weight loss, or fatigue  EYES: No eye pain, visual disturbances, or discharge  ENT:  No difficulty hearing, tinnitus, vertigo; No sinus or throat pain  NECK: No pain or stiffness  RESPIRATORY: No cough, wheezing, chills or hemoptysis; +decrease Shortness of Breath  CARDIOVASCULAR: No chest pain, palpitations, passing out, dizziness, or leg swelling  GASTROINTESTINAL: No abdominal or epigastric pain. No nausea, vomiting, or hematemesis; No diarrhea or constipation. No melena or hematochezia.  GENITOURINARY: No dysuria, frequency, hematuria, or incontinence  NEUROLOGICAL: No headaches, memory loss, loss of strength, numbness, or tremors  SKIN: No itching, burning, rashes, or lesions   LYMPH Nodes: No enlarged glands  ENDOCRINE: No heat or cold intolerance; No hair loss  MUSCULOSKELETAL: No joint pain or swelling; No muscle, back, or extremity pain  PSYCHIATRIC: No depression, anxiety, mood swings, or difficulty sleeping  HEME/LYMPH: No easy bruising, or bleeding gums  ALLERGY AND IMMUNOLOGIC: No hives or eczema	    [x ] All others negative	  [ ] Unable to obtain    PHYSICAL EXAM:  T(C): 37.6 (03-30-23 @ 05:04), Max: 37.6 (03-30-23 @ 00:37)  HR: 109 (03-30-23 @ 05:04) (72 - 109)  BP: 108/58 (03-30-23 @ 05:04) (104/65 - 110/65)  RR: 18 (03-30-23 @ 05:04) (18 - 18)  SpO2: 91% (03-30-23 @ 05:04) (91% - 95%)  Wt(kg): --  I&O's Summary    29 Mar 2023 07:01  -  30 Mar 2023 07:00  --------------------------------------------------------  IN: 1422.5 mL / OUT: 4500 mL / NET: -3077.5 mL        Appearance: Normal	  HEENT:   Normal oral mucosa, PERRL, EOMI	  Lymphatic: No lymphadenopathy  Cardiovascular: Normal S1 S2, No JVD, + murmurs, decrease edema  Respiratory: Lungs clear to auscultation	  Psychiatry: A & O x 3, Mood & affect appropriate  Gastrointestinal:  Soft, Non-tender, + BS	  Skin: No rashes, No ecchymoses, No cyanosis	  Neurologic: Non-focal  Extremities: Normal range of motion, No clubbing, cyanosis +, edema  Vascular: +pvd    MEDICATIONS  (STANDING):  aspirin  chewable 81 milliGRAM(s) Oral daily  atorvastatin 40 milliGRAM(s) Oral at bedtime  ceFAZolin   IVPB 2000 milliGRAM(s) IV Intermittent every 8 hours  furosemide Infusion 7.5 mG/Hr (3.75 mL/Hr) IV Continuous <Continuous>  heparin  Infusion 1300 Unit(s)/Hr (13 mL/Hr) IV Continuous <Continuous>  levothyroxine 137 MICROGram(s) Oral daily  metoprolol succinate  milliGRAM(s) Oral daily  spironolactone 25 milliGRAM(s) Oral two times a day      TELEMETRY: 	    ECG:  	  RADIOLOGY:  OTHER: 	  	  LABS:	 	    CARDIAC MARKERS:                                10.5   9.76  )-----------( 194      ( 30 Mar 2023 06:46 )             32.5     03-30    138  |  91<L>  |  45<H>  ----------------------------<  167<H>  3.3<L>   |  35<H>  |  1.15    Ca    9.5      30 Mar 2023 06:46      proBNP:   Lipid Profile:   HgA1c:   TSH:   PTT - ( 30 Mar 2023 06:46 )  PTT:39.9 sec  Culture - Blood (03.28.23 @ 07:03)    Specimen Source: .Blood Blood-Peripheral   Culture Results:   No growth to date.      < from: TTE W or WO Ultrasound Enhancing Agent (03.29.23 @ 14:59) >   1. Mildly dilated left ventricular cavity size. The left ventricular wall thickness is normal. The left ventricular systolic function is moderately decreased with an ejection fraction visually estimated at 35 to 40 %.   2. The left ventricular diastolic function is indeterminate.   3. Normal right ventricular cavity size, normal right ventricular wall thickness and mildly reduced systolic function. The tricuspid annular plane systolic excursion (TAPSE) is 0.9 cm (normal >=1.7 cm).   4. The left atrium is severely dilated.   5. Bioprosthetic valve present in the mitral position. No prosthetic valvular regurgitation.   6. The aortic annulus and aortic root appear normal in size. The aortic root at sinuses of Valsalva is borderline dilated.   7. No pericardial effusion seen.   8. Unable to rule out endocarditis.   9. Compared to the transthoracic echocardiogram performed on 7/5/2022 there have been no significant interval changes.  10. Estimated pulmonary artery systolic pressure is 37 mmHg.  11. No echocardiographic evidence of vegetations.      Assessment and plan  ---------------------------  77y m pmh BPH, Chronic venous insufficiency, HLD Hypothyroidism, S/P CABG x 2, S/P mitral valve replacement, SP Status post angioplasty, Sp TKR rt, Anasarca Pt comes to ed c/o shortness of breath for past month w progressive worsening of anasarca w swelling of scrotum and diff urinating, Has been noncompliant on diuretics for past two weeks w 20lb weight gain over past month. No fever and chills, sputum, nvdc, cp. PE Adult male lying stretcher w maked swelling to josh lower extr, w pitting edema,  chest  scant josh crackles w slight wheeze and prolonged exp phase  chf acute on chronic sec to RV dysfunction  start on lasix drip, strict i/o  fu lytes  tele, AFib check HR  a,fib , continue AC  will adjust cardiac meds  add aldactone 25 mg bid  pt still sig fluid overloaded, increase drip to 10 mg  fu lytes  +BC i bottle, MSSA , will discuss with ID one out of two POS   will consider ppm explant specially in view of bioprosthetic valve  may need ERENDIRA  will increase aldactone dose as tolertaed  abx as per ID  start on AC sec to a.fib  echo noted with sif decrease in EF will adjust cardiac meds  excellent diuresis will decrease iv lasix

## 2023-03-30 NOTE — PROGRESS NOTE ADULT - PROBLEM SELECTOR PLAN 1
-only 1 out of 4 positive - still suspect this is real   -cont ancef  -repeat bcx negative  -likely source is toe from recent nail clipping   -check TTE/ERENDIRA given PPM/AVR - for PPM extraction and micra placement   -EP input noted  -also with low grade fevers

## 2023-03-31 LAB
BASOPHILS # BLD AUTO: 0.04 K/UL — SIGNIFICANT CHANGE UP (ref 0–0.2)
BASOPHILS NFR BLD AUTO: 0.5 % — SIGNIFICANT CHANGE UP (ref 0–2)
BUN SERPL-MCNC: 43 MG/DL — HIGH (ref 7–23)
CALCIUM SERPL-MCNC: 10 MG/DL — SIGNIFICANT CHANGE UP (ref 8.4–10.5)
CHLORIDE SERPL-SCNC: 96 MMOL/L — SIGNIFICANT CHANGE UP (ref 96–108)
CO2 SERPL-SCNC: 34 MMOL/L — HIGH (ref 22–31)
CREAT SERPL-MCNC: 1.13 MG/DL — SIGNIFICANT CHANGE UP (ref 0.5–1.3)
EGFR: 67 ML/MIN/1.73M2 — SIGNIFICANT CHANGE UP
EOSINOPHIL # BLD AUTO: 0.27 K/UL — SIGNIFICANT CHANGE UP (ref 0–0.5)
EOSINOPHIL NFR BLD AUTO: 3.3 % — SIGNIFICANT CHANGE UP (ref 0–6)
GLUCOSE SERPL-MCNC: 126 MG/DL — HIGH (ref 70–99)
HCT VFR BLD CALC: 33.6 % — LOW (ref 39–50)
HGB BLD-MCNC: 11 G/DL — LOW (ref 13–17)
IMM GRANULOCYTES NFR BLD AUTO: 1.1 % — HIGH (ref 0–0.9)
LYMPHOCYTES # BLD AUTO: 0.78 K/UL — LOW (ref 1–3.3)
LYMPHOCYTES # BLD AUTO: 9.4 % — LOW (ref 13–44)
MAGNESIUM SERPL-MCNC: 2.1 MG/DL — SIGNIFICANT CHANGE UP (ref 1.6–2.6)
MCHC RBC-ENTMCNC: 30.6 PG — SIGNIFICANT CHANGE UP (ref 27–34)
MCHC RBC-ENTMCNC: 32.7 GM/DL — SIGNIFICANT CHANGE UP (ref 32–36)
MCV RBC AUTO: 93.6 FL — SIGNIFICANT CHANGE UP (ref 80–100)
MONOCYTES # BLD AUTO: 0.67 K/UL — SIGNIFICANT CHANGE UP (ref 0–0.9)
MONOCYTES NFR BLD AUTO: 8.1 % — SIGNIFICANT CHANGE UP (ref 2–14)
NEUTROPHILS # BLD AUTO: 6.41 K/UL — SIGNIFICANT CHANGE UP (ref 1.8–7.4)
NEUTROPHILS NFR BLD AUTO: 77.6 % — HIGH (ref 43–77)
NRBC # BLD: 0 /100 WBCS — SIGNIFICANT CHANGE UP (ref 0–0)
PLATELET # BLD AUTO: 209 K/UL — SIGNIFICANT CHANGE UP (ref 150–400)
POTASSIUM SERPL-MCNC: 3.8 MMOL/L — SIGNIFICANT CHANGE UP (ref 3.5–5.3)
POTASSIUM SERPL-SCNC: 3.8 MMOL/L — SIGNIFICANT CHANGE UP (ref 3.5–5.3)
RBC # BLD: 3.59 M/UL — LOW (ref 4.2–5.8)
RBC # FLD: 13.9 % — SIGNIFICANT CHANGE UP (ref 10.3–14.5)
SODIUM SERPL-SCNC: 143 MMOL/L — SIGNIFICANT CHANGE UP (ref 135–145)
WBC # BLD: 8.26 K/UL — SIGNIFICANT CHANGE UP (ref 3.8–10.5)
WBC # FLD AUTO: 8.26 K/UL — SIGNIFICANT CHANGE UP (ref 3.8–10.5)

## 2023-03-31 PROCEDURE — 93010 ELECTROCARDIOGRAM REPORT: CPT

## 2023-03-31 PROCEDURE — 99233 SBSQ HOSP IP/OBS HIGH 50: CPT

## 2023-03-31 PROCEDURE — 71046 X-RAY EXAM CHEST 2 VIEWS: CPT | Mod: 26

## 2023-03-31 PROCEDURE — 99232 SBSQ HOSP IP/OBS MODERATE 35: CPT

## 2023-03-31 RX ORDER — APIXABAN 2.5 MG/1
5 TABLET, FILM COATED ORAL
Refills: 0 | Status: DISCONTINUED | OUTPATIENT
Start: 2023-03-31 | End: 2023-04-04

## 2023-03-31 RX ORDER — BUMETANIDE 0.25 MG/ML
1 INJECTION INTRAMUSCULAR; INTRAVENOUS DAILY
Refills: 0 | Status: DISCONTINUED | OUTPATIENT
Start: 2023-03-31 | End: 2023-04-03

## 2023-03-31 RX ADMIN — Medication 137 MICROGRAM(S): at 06:08

## 2023-03-31 RX ADMIN — APIXABAN 5 MILLIGRAM(S): 2.5 TABLET, FILM COATED ORAL at 17:54

## 2023-03-31 RX ADMIN — SPIRONOLACTONE 25 MILLIGRAM(S): 25 TABLET, FILM COATED ORAL at 17:54

## 2023-03-31 RX ADMIN — Medication 100 MILLIGRAM(S): at 06:08

## 2023-03-31 RX ADMIN — Medication 100 MILLIGRAM(S): at 13:06

## 2023-03-31 RX ADMIN — Medication 100 MILLIGRAM(S): at 22:08

## 2023-03-31 RX ADMIN — Medication 650 MILLIGRAM(S): at 10:40

## 2023-03-31 RX ADMIN — SPIRONOLACTONE 25 MILLIGRAM(S): 25 TABLET, FILM COATED ORAL at 06:08

## 2023-03-31 RX ADMIN — Medication 81 MILLIGRAM(S): at 17:54

## 2023-03-31 RX ADMIN — Medication 100 MILLIGRAM(S): at 06:18

## 2023-03-31 RX ADMIN — Medication 650 MILLIGRAM(S): at 11:30

## 2023-03-31 RX ADMIN — ATORVASTATIN CALCIUM 40 MILLIGRAM(S): 80 TABLET, FILM COATED ORAL at 22:11

## 2023-03-31 NOTE — PROGRESS NOTE ADULT - SUBJECTIVE AND OBJECTIVE BOX
JANEE ARCEO 77y [id number]    Patient is a 77y old  Male who presents with a chief complaint of leg  swelling (25 Mar 2023 17:39)      Follow Up/CC:  ID following for bacteremia    Interval History/ROS: no fever, s/p PPM removal 3/30    Allergies    alfuzosin (Hives)  levofloxacin (Hives)  Myrbetriq (Hives)  tamsulosin (Hives)    Intolerances        ANTIMICROBIALS:  ceFAZolin   IVPB 2000 every 8 hours      MEDICATIONS  (STANDING):  apixaban 5 milliGRAM(s) Oral two times a day  aspirin  chewable 81 milliGRAM(s) Oral daily  atorvastatin 40 milliGRAM(s) Oral at bedtime  buMETAnide Injectable 1 milliGRAM(s) IV Push daily  ceFAZolin   IVPB 2000 milliGRAM(s) IV Intermittent every 8 hours  levothyroxine 137 MICROGram(s) Oral daily  metoprolol succinate  milliGRAM(s) Oral daily  spironolactone 25 milliGRAM(s) Oral two times a day    MEDICATIONS  (PRN):  acetaminophen     Tablet .. 650 milliGRAM(s) Oral every 6 hours PRN Mild Pain (1 - 3)  oxyCODONE    IR 5 milliGRAM(s) Oral every 6 hours PRN Moderate Pain (4 - 6)        Vital Signs Last 24 Hrs  T(C): 36.7 (31 Mar 2023 04:31), Max: 37.3 (30 Mar 2023 11:38)  T(F): 98 (31 Mar 2023 04:31), Max: 99.2 (30 Mar 2023 11:38)  HR: 77 (31 Mar 2023 04:31) (65 - 108)  BP: 111/65 (31 Mar 2023 04:31) (101/60 - 143/61)  BP(mean): 72 (30 Mar 2023 18:00) (72 - 99)  RR: 18 (31 Mar 2023 04:31) (16 - 18)  SpO2: 92% (31 Mar 2023 04:31) (92% - 99%)    Parameters below as of 31 Mar 2023 04:31  Patient On (Oxygen Delivery Method): nasal cannula        CBC Full  -  ( 31 Mar 2023 06:57 )  WBC Count : 8.26 K/uL  RBC Count : 3.59 M/uL  Hemoglobin : 11.0 g/dL  Hematocrit : 33.6 %  Platelet Count - Automated : 209 K/uL  Mean Cell Volume : 93.6 fl  Mean Cell Hemoglobin : 30.6 pg  Mean Cell Hemoglobin Concentration : 32.7 gm/dL  Auto Neutrophil # : 6.41 K/uL  Auto Lymphocyte # : 0.78 K/uL  Auto Monocyte # : 0.67 K/uL  Auto Eosinophil # : 0.27 K/uL  Auto Basophil # : 0.04 K/uL  Auto Neutrophil % : 77.6 %  Auto Lymphocyte % : 9.4 %  Auto Monocyte % : 8.1 %  Auto Eosinophil % : 3.3 %  Auto Basophil % : 0.5 %    03-31    143  |  96  |  43<H>  ----------------------------<  126<H>  3.8   |  34<H>  |  1.13    Ca    10.0      31 Mar 2023 06:57  Mg     2.1     03-31            MICROBIOLOGY:  .Other Other  03-30-23   Testing in progress  --  --      .Blood Blood-Peripheral  03-28-23   No growth to date.  --  --      .Blood  03-25-23   Growth in anaerobic bottle: Staphylococcus aureus  ***Blood Panel PCR results on this specimen are available  approximately 3 hours after the Gram stain result.***  Gram stain, PCR, and/or culture results may not always  correspond due to difference in methodologies.  ************************************************************  This PCR assay was performed by multiplex PCR. This  Assay tests for 66 bacterial and resistance gene targets.  Please refer to the [facility] test directory  at [email or web address] for details.  --  Blood Culture PCR  Staphylococcus aureus      .Blood  03-25-23   No Growth Final  --  --              v    Rapid RVP Result: Citlali (03-25 @ 11:55)          RADIOLOGY

## 2023-03-31 NOTE — ADVANCED PRACTICE NURSE CONSULT - RECOMMEDATIONS
Post procedure verbal instructions given to the patient or patient representative. Patient or patient representative  instructed regarding signs and symptoms of infection. Patient instructed to keep dressing dry and clean. Care for catheter as per hospital protocols     VS before proc:115/63,HR 70, O2sat 94, T 97.6      VS after WNL

## 2023-03-31 NOTE — PROGRESS NOTE ADULT - ASSESSMENT
78 y/o male PMHx HTN, HLD, CAD s/p stent on ASA, A-fib, hypothyroid, CHF now presenting to the ED worsening SOB, DUNCAN, peripheral edema, scrotal edema and 20 pound unintentional weight gain over last month with MSSA bacteremia, HF    Kevin Olmos  Attending Physician   Division of Infectious Disease  Office #[phone]  Available on Microsoft Teams also  After 5pm/weekend or no response, call #[phone]

## 2023-03-31 NOTE — PROGRESS NOTE ADULT - SUBJECTIVE AND OBJECTIVE BOX
afberile  REVIEW OF SYSTEMS:  CONSTITUTIONAL: No fever,  no  weight loss  ENT:  No  tinnitus,   no   vertigo  NECK: No pain or stiffness  RESPIRATORY: No cough, wheezing, chills or hemoptysis;    No Shortness of Breath  CARDIOVASCULAR: No chest pain, palpitations, dizziness  GASTROINTESTINAL: No abdominal or epigastric pain. No nausea, vomiting, or hematemesis; No diarrhea  No melena or hematochezia.  GENITOURINARY: No dysuria, frequency, hematuria, or incontinence  NEUROLOGICAL: No headaches  SKIN: No itching,  no   rash  LYMPH Nodes: No enlarged glands  ENDOCRINE: No heat or cold intolerance  MUSCULOSKELETAL: No joint pain or swelling  PSYCHIATRIC: No depression, anxiety  HEME/LYMPH: No easy bruising, or bleeding gums  ALLERGY AND IMMUNOLOGIC: No hives or eczema	    MEDICATIONS  (STANDING):  aspirin  chewable 81 milliGRAM(s) Oral daily  atorvastatin 40 milliGRAM(s) Oral at bedtime  ceFAZolin   IVPB 2000 milliGRAM(s) IV Intermittent every 8 hours  levothyroxine 137 MICROGram(s) Oral daily  metoprolol succinate  milliGRAM(s) Oral daily  spironolactone 25 milliGRAM(s) Oral two times a day    MEDICATIONS  (PRN):  acetaminophen     Tablet .. 650 milliGRAM(s) Oral every 6 hours PRN Mild Pain (1 - 3)  oxyCODONE    IR 5 milliGRAM(s) Oral every 6 hours PRN Moderate Pain (4 - 6)      Vital Signs Last 24 Hrs  T(C): 36.7 (31 Mar 2023 04:31), Max: 37.3 (30 Mar 2023 11:38)  T(F): 98 (31 Mar 2023 04:31), Max: 99.2 (30 Mar 2023 11:38)  HR: 77 (31 Mar 2023 04:31) (65 - 108)  BP: 111/65 (31 Mar 2023 04:31) (101/60 - 143/61)  BP(mean): 72 (30 Mar 2023 18:00) (72 - 99)  RR: 18 (31 Mar 2023 04:31) (16 - 18)  SpO2: 92% (31 Mar 2023 04:31) (92% - 99%)    Parameters below as of 31 Mar 2023 04:31  Patient On (Oxygen Delivery Method): nasal cannula      CAPILLARY BLOOD GLUCOSE        I&O's Summary    30 Mar 2023 07:01  -  31 Mar 2023 07:00  --------------------------------------------------------  IN: 0 mL / OUT: 550 mL / NET: -550 mL          Appearance: Normal	  HEENT:   Normal oral mucosa, PERRL, EOMI	  Lymphatic: No lymphadenopathy  Cardiovascular: Normal S1 S2, No JVD  Respiratory: Lungs clear to auscultation	  Psychiatry: A & O x 3, Mood & affect appropriate  Gastrointestinal:  Soft, Non-tender, + BS	  Skin: No rash, No ecchymoses	  Extremities: Normal range of motion  Vascular: Peripheral pulses palpable bilaterally   c/c  edema    LABS:                        11.0   8.26  )-----------( 209      ( 31 Mar 2023 06:57 )             33.6     03-30    138  |  91<L>  |  45<H>  ----------------------------<  167<H>  3.3<L>   |  35<H>  |  1.15    Ca    9.5      30 Mar 2023 06:46      PTT - ( 30 Mar 2023 06:46 )  PTT:39.9 sec                      Culture - Fungal, Other (collected 03-30-23 @ 22:15)  Source: .Other Other  Preliminary Report (03-31-23 @ 07:23):    Testing in progress        Consultant(s) Notes Reviewed:      Care Discussed with Consultants/Other Providers:

## 2023-03-31 NOTE — ADVANCED PRACTICE NURSE CONSULT - ASSESSMENT
Central Line Catheter Insertion Note  Patient  educated about central line associated blood stream infection prevention practices.  Catheter type: SL Picc  : Bard  Power injectable: Yes    Informed consent obtained by covering floor team.  Procedure assisted by: Charlene Beth RN  Time out was preformed, confirming the patient's first and last name, date of birth, procedure, and correct site prior to state of procedure.    Patient was placed in supine position. Patient placement site was prepped with chlorhexidine solution, then draped using maximum sterile barrier protection. The area was injected with 2 ml of 1% lidocaine. Using the ultrasound, the catheter was introduced. Strict adherence to outline aseptic technique. Upon completion of line placement, the insertion site was covered with a sterile occlusive dressing. Pt tolerated procedure well. Minimal blood loss.     All materials used for catheter insertion, including the intact guide wire, were accounted for at the end of the procedure.    Number of attempts: 1  Complications/Comments: None    Emergency Placement:  No  Site: New   Anatomical Site of insertion: Right basilic  Catheter size/length: 4F   40cm  US guided Bard SL lumen power picc placed    Post procedure verification with chest Xray as per orders.

## 2023-03-31 NOTE — PROGRESS NOTE ADULT - SUBJECTIVE AND OBJECTIVE BOX
24H hour events: s/p PPM extraction w/ Micra implant 3/30     MEDICATIONS:  apixaban 5 milliGRAM(s) Oral two times a day  aspirin  chewable 81 milliGRAM(s) Oral daily  buMETAnide Injectable 1 milliGRAM(s) IV Push daily  metoprolol succinate  milliGRAM(s) Oral daily  spironolactone 25 milliGRAM(s) Oral two times a day    ceFAZolin   IVPB 2000 milliGRAM(s) IV Intermittent every 8 hours      acetaminophen     Tablet .. 650 milliGRAM(s) Oral every 6 hours PRN  oxyCODONE    IR 5 milliGRAM(s) Oral every 6 hours PRN      atorvastatin 40 milliGRAM(s) Oral at bedtime  levothyroxine 137 MICROGram(s) Oral daily        REVIEW OF SYSTEMS:  See HPI, otherwise ROS negative.    PHYSICAL EXAM:  T(C): 36.7 (03-31-23 @ 04:31), Max: 37.3 (03-30-23 @ 11:38)  HR: 77 (03-31-23 @ 04:31) (65 - 108)  BP: 111/65 (03-31-23 @ 04:31) (101/60 - 143/61)  RR: 18 (03-31-23 @ 04:31) (16 - 18)  SpO2: 92% (03-31-23 @ 04:31) (92% - 99%)  Wt(kg): --  I&O's Summary    30 Mar 2023 07:01  -  31 Mar 2023 07:00  --------------------------------------------------------  IN: 0 mL / OUT: 550 mL / NET: -550 mL        Appearance: Alert. NAD	  Cardiovascular: +S1S2 irregular no m/g/r  Respiratory: CTA B/L	  Psychiatry: A & O x 3, Mood & affect appropriate  Gastrointestinal:  Soft, NT. ND. +BS	  Skin: No rashes, masses or lesions   L infraclavicular site c/d/i w/o hematoma   Groin site c/d/i w/o hematoma, stitch removed   Neurologic: Non-focal  Extremities: No edema BLE  Vascular: Peripheral pulses palpable 2+ bilaterally      LABS:	 	    CBC Full  -  ( 31 Mar 2023 06:57 )  WBC Count : 8.26 K/uL  Hemoglobin : 11.0 g/dL  Hematocrit : 33.6 %  Platelet Count - Automated : 209 K/uL  Mean Cell Volume : 93.6 fl  Mean Cell Hemoglobin : 30.6 pg  Mean Cell Hemoglobin Concentration : 32.7 gm/dL  Auto Neutrophil # : 6.41 K/uL  Auto Lymphocyte # : 0.78 K/uL  Auto Monocyte # : 0.67 K/uL  Auto Eosinophil # : 0.27 K/uL  Auto Basophil # : 0.04 K/uL  Auto Neutrophil % : 77.6 %  Auto Lymphocyte % : 9.4 %  Auto Monocyte % : 8.1 %  Auto Eosinophil % : 3.3 %  Auto Basophil % : 0.5 %    03-31    143  |  96  |  43<H>  ----------------------------<  126<H>  3.8   |  34<H>  |  1.13  03-30    138  |  91<L>  |  45<H>  ----------------------------<  167<H>  3.3<L>   |  35<H>  |  1.15    Ca    10.0      31 Mar 2023 06:57  Ca    9.5      30 Mar 2023 06:46  Mg     2.1     03-31      TELEMETRY: AF 70-90's   	    TTE: < from: Intra-Operative Transesophageal Echo W or WO Ultrasound Enhancing Agent (03.30.23 @ 12:48) >  ERENDIRA Procedure:  ERENDIRA exam risks and benefits discussed with patient, and patient agrees to intra-operative ERENDIRA exam. The adult Christiana ERENDIRA probe was introduced and passed into the esophagus. Bite block used. Probe inserted into the mouth, advanced into esophagus atraumatically. Images were obtained with the patient in a supine position.    Limited ERENDIRA exam performed for monitoring during lead extraction.     --------------------------------------------------------------------------------  PRE-BYPASS FINDINGS     Left Ventricle:  Left ventricular ejection fraction is estimated at 25 to 30%. There is severe kypokinesis in the left ventricle.     Right Ventricle:  Moderately reduced systolic function. Moderately reduced systolic function. There is moderate hypokinesis in the right ventricle.     Left Atrium:  Diffuse smoke visualized in LA.     Aortic Valve:  The aortic valve appears trileaflet. There is moderate aortic regurgitation.    No obvious vegetations visualized     Mitral Valve:  Bioprosthetic valve is present in the mitral position. There is calcification of the mitral valve annulus.  No obvious vegetations visualized.     Tricuspid Valve:  There is moderate-severe tricuspid regurgitation. TR unchanged following lead extraction.     Pulmonic Valve:  There is mild pulmonic regurgitation.     Pericardium:  No pericardial effusion seen. No pericardial effusion visualized before and after lead extraction.    < end of copied text >

## 2023-03-31 NOTE — PROGRESS NOTE ADULT - PROBLEM SELECTOR PLAN 1
-only 1 out of 4 positive - still suspect this is real   -cont ancef 2 gm iv q8  -repeat bcx negative  -likely source is toe from recent nail clipping   -ERENDIRA negative for vegetations  -s/p PPM extraction and micra placement   -EP input noted  -also with low grade fevers  -picc  -6 weeks iv abx - day 1 of 42 of abx  -potential side effects of abx explained including GI, Cdiff, allergy issues, development of resistance, etc.  -potential side effects of PICC explained including bleeding and infection  -weekly cbc, bun/creatinine - fax [phone]  -f/u OR cx

## 2023-03-31 NOTE — PROGRESS NOTE ADULT - SUBJECTIVE AND OBJECTIVE BOX
CARDIOLOGY     PROGRESS  NOTE   ________________________________________________    CHIEF COMPLAINT:Patient is a 77y old  Male who presents with a chief complaint of leg  swelling (25 Mar 2023 17:39)  no complain  	  REVIEW OF SYSTEMS:  CONSTITUTIONAL: No fever, weight loss, or fatigue  EYES: No eye pain, visual disturbances, or discharge  ENT:  No difficulty hearing, tinnitus, vertigo; No sinus or throat pain  NECK: No pain or stiffness  RESPIRATORY: No cough, wheezing, chills or hemoptysis; No Shortness of Breath  CARDIOVASCULAR: No chest pain, palpitations, passing out, dizziness, or leg swelling  GASTROINTESTINAL: No abdominal or epigastric pain. No nausea, vomiting, or hematemesis; No diarrhea or constipation. No melena or hematochezia.  GENITOURINARY: No dysuria, frequency, hematuria, or incontinence  NEUROLOGICAL: No headaches, memory loss, loss of strength, numbness, or tremors  SKIN: No itching, burning, rashes, or lesions   LYMPH Nodes: No enlarged glands  ENDOCRINE: No heat or cold intolerance; No hair loss  MUSCULOSKELETAL: No joint pain or swelling; No muscle, back, or extremity pain  PSYCHIATRIC: No depression, anxiety, mood swings, or difficulty sleeping  HEME/LYMPH: No easy bruising, or bleeding gums  ALLERGY AND IMMUNOLOGIC: No hives or eczema	    [ ] All others negative	  [x ] Unable to obtain    PHYSICAL EXAM:  T(C): 36.7 (03-31-23 @ 04:31), Max: 37.3 (03-30-23 @ 11:38)  HR: 77 (03-31-23 @ 04:31) (65 - 108)  BP: 111/65 (03-31-23 @ 04:31) (101/60 - 143/61)  RR: 18 (03-31-23 @ 04:31) (16 - 18)  SpO2: 92% (03-31-23 @ 04:31) (92% - 99%)  Wt(kg): --  I&O's Summary    30 Mar 2023 07:01  -  31 Mar 2023 07:00  --------------------------------------------------------  IN: 0 mL / OUT: 550 mL / NET: -550 mL        Appearance: Normal	  HEENT:   Normal oral mucosa, PERRL, EOMI	  Lymphatic: No lymphadenopathy  Cardiovascular: Normal S1 S2, No JVD, + murmurs, + edema  Respiratory:rhonchi  Psychiatry: A & O x 3, Mood & affect appropriate  Gastrointestinal:  Soft, Non-tender, + BS	  Skin: No rashes, No ecchymoses, No cyanosis	  Neurologic: Non-focal  Extremities: Normal range of motion, No clubbing, cyanosis + edema  Vascular: Peripheral pulses palpable 2+ bilaterally    MEDICATIONS  (STANDING):  aspirin  chewable 81 milliGRAM(s) Oral daily  atorvastatin 40 milliGRAM(s) Oral at bedtime  ceFAZolin   IVPB 2000 milliGRAM(s) IV Intermittent every 8 hours  levothyroxine 137 MICROGram(s) Oral daily  metoprolol succinate  milliGRAM(s) Oral daily  spironolactone 25 milliGRAM(s) Oral two times a day      TELEMETRY: 	    ECG:  	  RADIOLOGY:  OTHER: 	  	  LABS:	 	    CARDIAC MARKERS:                                11.0   8.26  )-----------( 209      ( 31 Mar 2023 06:57 )             33.6     03-30    138  |  91<L>  |  45<H>  ----------------------------<  167<H>  3.3<L>   |  35<H>  |  1.15    Ca    9.5      30 Mar 2023 06:46      proBNP:   Lipid Profile:   HgA1c:   TSH:   PTT - ( 30 Mar 2023 06:46 )  PTT:39.9 sec  < from: Intra-Operative Transesophageal Echo (08.30.21 @ 18:55) >  1. Thickened mitral valve. Bileaflet prolapse. Severe  mitral regurgitation.  2. Normal trileaflet aortic valve. Estimated aortic valve  area equals 2.1 sqcm. Mild aortic regurgitation.  3. Normal aortic root.  4. Massive left atrial enlargement.  5. Severe left ventricular enlargement.  6. Normal left ventricular systolic function.  7. Normal right atrium.  8. Right ventricular enlargement with decreased right  ventricular systolic function.  9. Normal tricuspid valve.  10. Normal pulmonic valve.  11. Normal pericardium with no pericardial effusion.    Assessment and plan  ---------------------------  77y m pmh BPH, Chronic venous insufficiency, HLD Hypothyroidism, S/P CABG x 2, S/P mitral valve replacement, SP Status post angioplasty, Sp TKR rt, Anasarca Pt comes to ed c/o shortness of breath for past month w progressive worsening of anasarca w swelling of scrotum and diff urinating, Has been noncompliant on diuretics for past two weeks w 20lb weight gain over past month. No fever and chills, sputum, nvdc, cp. PE Adult male lying stretcher w maked swelling to josh lower extr, w pitting edema,  chest  scant josh crackles w slight wheeze and prolonged exp phase  chf acute on chronic sec to RV dysfunction  start on lasix drip, strict i/o  fu lytes  tele, AFib check HR  a,fib , continue AC  will adjust cardiac meds  add aldactone 25 mg bid  +BC i bottle, MSSA , will discuss with ID one out of two POS   will consider ppm explant specially in view of bioprosthetic valve  will increase aldactone dose as tolertaed  abx as per ID  s/p PPM and LEAD extraction  start on AC sec to a.fib as per Dr Chaudhry  echo noted with sif decrease in EF will adjust cardiac meds  start on iv bumex, awaiting lytes  repeat cbc post procedure

## 2023-03-31 NOTE — PROGRESS NOTE ADULT - ASSESSMENT
77  year old male    h/o  CAD s/p stent , asa.      A-fib/ PPM, , hypothyroid, and total left knee replacement    prior  PPM  interrogation  with  WCT/   s/p  brief   bursts  of  afib/ , on prior visit   Ct chest, retrosternal hematoma.  mod left pl effusion    was  on bumex/  aldactone        *   admitted with    wprsening   pedal  edema        component  of  cellulitis   and  from  c/c  diastolic  chf,/        pt  admits to  being  non complaint   with  his meds       and  from  c/c   lymphedema,  carla left  leg      diuretics   and iv  ancef   *   h/o    c/c AFIB,   has  PPM         on eliquis     *    Anemia, , hb stable, had  been seen by  gi  eval dr joann sanchez in past     *  CAD,     cath, with 2 vessel disease,  s/p  CABG and  MVR  surg d r marni   *    h/o  Afib on    eliquis         c/c leg redness/  4 +  edema, on keflex, has  improved    *     echo,  on 7/2022,  ef  35/ new/ severe  TR          cath,   was non  obstructive    *  on synthroid          on asa/  torpol/  eliquis         crt is  1.3,  from  lasix// aldactone.  leg  swelling  is  lessening   *  MSSA  bacterinia         on iv ancef.  ,  rpt bcx    are  negative        explant pf  ppm  and  Micra  placement   Echo,  ef  25,  mod  AI.  severe  TR       f/p wih  dr mikayla dick         rad< from: TTE with Doppler (w/Cont) (07.05.22 @ 09:42) >  Conclusions:  Endocardial visualization enhanced with intravenous  injection of Ultrasonic Enhancing Agent (Definity).  Severe left ventricular enlargement.  Estimated ejection fraction 35%. Diffuse hypokinesis, with  inferior akinesis.  Bioprosthetic mitral valve with normal function.  Right ventricular enlargement with decreased right  ventricular systolic function.  A device wire is noted in the right heart.  ------------------------------------------------------------------------  Confirmed on  7/5/2022 - 12:16:10 by Kristian    < end of copied text >

## 2023-03-31 NOTE — PROGRESS NOTE ADULT - ASSESSMENT
76 y/o M with PMHX significant for permanent AF for 6-7 years (on Eliquis), CAD S/P CABG/MVR 2021, SALONI 2007, S/p right TKR, BPH, chronic venous insufficiency, HLD, HTN, hypothyroidism, varicose veins with stripping, infection of right foot 2019, L TKR with multiple infections post op, Medtronic dual chamber PPM 2012 (generator change 2021).  He comes to ED with c/o shortness of breath for past month with progressive worsening of anasarca with swelling of scrotum and difficulty with urinating.  Admits to noncompliance with diuretics for past two weeks with 25lb weight gain over past month.  Patient states that he had recent toe nail clipping by Podiatrist approximately 1 week prior to admission.  He states that his toe has had pain and bleeding since nail clipping with RLE erythema.  Denies fevers at home but states he had fever Saturday of 103F.  Admits to improvement in lower extremity edema since admission.     1. MSSA Bacteremia, admitted with fever and leukocytosis   2. HFrEF exacerbation with EF 35%  3. Permanent, chronic AF    - S/p pacemaker extraction with Micra implant 3/30  - Post operative instructions reviewed w/ patient and girlfriend at bedside. PPM ID card/booklet and wound check appointment given. 4/12/23 11:40AM   - Patient is in AF, not pacer dependent.  On tele now not Vpacing   - CXR reviewed   - Eliquis 5mg bid to start tonight. Avoid Heparin/Lovenox SQ to prevent pocket hematoma   - Continue on IV Ancef per ID. 1/4 culture positive for Staph aureus, follow up repeat cultures/ID recs. ? Need for PICC

## 2023-04-01 LAB
ANION GAP SERPL CALC-SCNC: 11 MMOL/L — SIGNIFICANT CHANGE UP (ref 5–17)
BUN SERPL-MCNC: 43 MG/DL — HIGH (ref 7–23)
CALCIUM SERPL-MCNC: 9.4 MG/DL — SIGNIFICANT CHANGE UP (ref 8.4–10.5)
CHLORIDE SERPL-SCNC: 92 MMOL/L — LOW (ref 96–108)
CO2 SERPL-SCNC: 35 MMOL/L — HIGH (ref 22–31)
CREAT SERPL-MCNC: 1.27 MG/DL — SIGNIFICANT CHANGE UP (ref 0.5–1.3)
EGFR: 59 ML/MIN/1.73M2 — LOW
GLUCOSE SERPL-MCNC: 134 MG/DL — HIGH (ref 70–99)
HCT VFR BLD CALC: 33 % — LOW (ref 39–50)
HGB BLD-MCNC: 10.4 G/DL — LOW (ref 13–17)
MCHC RBC-ENTMCNC: 29.1 PG — SIGNIFICANT CHANGE UP (ref 27–34)
MCHC RBC-ENTMCNC: 31.5 GM/DL — LOW (ref 32–36)
MCV RBC AUTO: 92.4 FL — SIGNIFICANT CHANGE UP (ref 80–100)
NRBC # BLD: 0 /100 WBCS — SIGNIFICANT CHANGE UP (ref 0–0)
PLATELET # BLD AUTO: 245 K/UL — SIGNIFICANT CHANGE UP (ref 150–400)
POTASSIUM SERPL-MCNC: 3.8 MMOL/L — SIGNIFICANT CHANGE UP (ref 3.5–5.3)
POTASSIUM SERPL-SCNC: 3.8 MMOL/L — SIGNIFICANT CHANGE UP (ref 3.5–5.3)
RBC # BLD: 3.57 M/UL — LOW (ref 4.2–5.8)
RBC # FLD: 13.8 % — SIGNIFICANT CHANGE UP (ref 10.3–14.5)
SODIUM SERPL-SCNC: 138 MMOL/L — SIGNIFICANT CHANGE UP (ref 135–145)
WBC # BLD: 7.8 K/UL — SIGNIFICANT CHANGE UP (ref 3.8–10.5)
WBC # FLD AUTO: 7.8 K/UL — SIGNIFICANT CHANGE UP (ref 3.8–10.5)

## 2023-04-01 PROCEDURE — 99232 SBSQ HOSP IP/OBS MODERATE 35: CPT

## 2023-04-01 PROCEDURE — 71045 X-RAY EXAM CHEST 1 VIEW: CPT | Mod: 26

## 2023-04-01 RX ORDER — POTASSIUM CHLORIDE 20 MEQ
20 PACKET (EA) ORAL ONCE
Refills: 0 | Status: COMPLETED | OUTPATIENT
Start: 2023-04-01 | End: 2023-04-01

## 2023-04-01 RX ADMIN — ATORVASTATIN CALCIUM 40 MILLIGRAM(S): 80 TABLET, FILM COATED ORAL at 22:55

## 2023-04-01 RX ADMIN — Medication 100 MILLIGRAM(S): at 05:53

## 2023-04-01 RX ADMIN — Medication 137 MICROGRAM(S): at 05:53

## 2023-04-01 RX ADMIN — SPIRONOLACTONE 25 MILLIGRAM(S): 25 TABLET, FILM COATED ORAL at 05:54

## 2023-04-01 RX ADMIN — Medication 20 MILLIEQUIVALENT(S): at 22:55

## 2023-04-01 RX ADMIN — APIXABAN 5 MILLIGRAM(S): 2.5 TABLET, FILM COATED ORAL at 17:26

## 2023-04-01 RX ADMIN — APIXABAN 5 MILLIGRAM(S): 2.5 TABLET, FILM COATED ORAL at 05:57

## 2023-04-01 RX ADMIN — Medication 100 MILLIGRAM(S): at 13:08

## 2023-04-01 RX ADMIN — Medication 100 MILLIGRAM(S): at 22:56

## 2023-04-01 RX ADMIN — Medication 100 MILLIGRAM(S): at 05:59

## 2023-04-01 RX ADMIN — Medication 81 MILLIGRAM(S): at 17:26

## 2023-04-01 RX ADMIN — BUMETANIDE 1 MILLIGRAM(S): 0.25 INJECTION INTRAMUSCULAR; INTRAVENOUS at 05:57

## 2023-04-01 RX ADMIN — SPIRONOLACTONE 25 MILLIGRAM(S): 25 TABLET, FILM COATED ORAL at 17:27

## 2023-04-01 NOTE — PROGRESS NOTE ADULT - SUBJECTIVE AND OBJECTIVE BOX
24H hour events: No acute overnight events    MEDICATIONS:  apixaban 5 milliGRAM(s) Oral two times a day  aspirin  chewable 81 milliGRAM(s) Oral daily  buMETAnide Injectable 1 milliGRAM(s) IV Push daily  metoprolol succinate  milliGRAM(s) Oral daily  spironolactone 25 milliGRAM(s) Oral two times a day  ceFAZolin   IVPB 2000 milliGRAM(s) IV Intermittent every 8 hours  acetaminophen     Tablet .. 650 milliGRAM(s) Oral every 6 hours PRN  oxyCODONE    IR 5 milliGRAM(s) Oral every 6 hours PRN  atorvastatin 40 milliGRAM(s) Oral at bedtime  levothyroxine 137 MICROGram(s) Oral daily    REVIEW OF SYSTEMS:  See HPI, otherwise ROS negative.    PHYSICAL EXAM:  T(C): 36.9 (04-01-23 @ 04:38), Max: 37.1 (04-01-23 @ 00:45)  HR: 73 (04-01-23 @ 04:38) (59 - 90)  BP: 110/67 (04-01-23 @ 04:38) (101/62 - 124/79)  RR: 18 (04-01-23 @ 04:38) (16 - 18)  SpO2: 92% (04-01-23 @ 04:38) (92% - 95%)  Wt(kg): --  I&O's Summary    31 Mar 2023 07:01  -  01 Apr 2023 07:00  --------------------------------------------------------  IN: 50 mL / OUT: 1050 mL / NET: -1000 mL        Appearance: Alert. NAD	  Cardiovascular: Irregular  Respiratory: CTA B/L	  Psychiatry: A & O x 3, Mood & affect appropriate  Gastrointestinal:  Soft, NT. ND. +BS	  Skin: LCWS with steri-strips in place, no edema, erythema or ecchymosis  Neurologic: Non-focal  Extremities: No edema BLE  Vascular: Peripheral pulses palpable 2+ bilaterally      LABS:	 	    CBC Full  -  ( 31 Mar 2023 06:57 )  WBC Count : 8.26 K/uL  Hemoglobin : 11.0 g/dL  Hematocrit : 33.6 %  Platelet Count - Automated : 209 K/uL  Mean Cell Volume : 93.6 fl  Mean Cell Hemoglobin : 30.6 pg  Mean Cell Hemoglobin Concentration : 32.7 gm/dL  Auto Neutrophil # : 6.41 K/uL  Auto Lymphocyte # : 0.78 K/uL  Auto Monocyte # : 0.67 K/uL  Auto Eosinophil # : 0.27 K/uL  Auto Basophil # : 0.04 K/uL  Auto Neutrophil % : 77.6 %  Auto Lymphocyte % : 9.4 %  Auto Monocyte % : 8.1 %  Auto Eosinophil % : 3.3 %  Auto Basophil % : 0.5 %    04-01    138  |  92<L>  |  43<H>  ----------------------------<  134<H>  3.8   |  35<H>  |  1.27  03-31    143  |  96  |  43<H>  ----------------------------<  126<H>  3.8   |  34<H>  |  1.13    Ca    9.4      01 Apr 2023 07:02  Ca    10.0      31 Mar 2023 06:57  Mg     2.1     03-31    TELEMETRY: [id number] with PVCs

## 2023-04-01 NOTE — PROGRESS NOTE ADULT - ASSESSMENT
77  year old male    h/o  CAD s/p stent , asa.      A-fib/ PPM, , hypothyroid, and total left knee replacement    prior  PPM  interrogation  with  WCT/   s/p  brief   bursts  of  afib/ , on prior visit   Ct chest, retrosternal hematoma.  mod left pl effusion    was  on bumex/  aldactone        *   admitted with    wprsening   pedal  edema        component  of  cellulitis   and  from  c/c  diastolic  chf,/  h/o  l/edema  of L  leg        pt  admits to  being  non complaint with  meds        *   h/o    c/c AFIB,   has  PPM         on eliquis     *    Anemia, , hb stable, had  been seen by  gi  eval dr joann sanchez in past     *    CAD,     cath, with 2 vessel disease,  s/p  CABG and  MVR  surg d r marni   *      h/o  Afib on    eliquis          c/c leg redness/  4 +  edema, on keflex, has  improved    *     echo,  on 7/2022,  ef  35/ new/ severe  TR          cath,   was non  obstructive    *    on synthroid          on asa/  torpol/  eliquis          crt  noted,   from  lasix// aldactone.  leg  swelling  is  lessening   *   MSSA  bacterinia         on iv ancef.  ,  rpt bcx    are  negative        explant pf  ppm  and  Micra  placement on  3/30/23. dr sunitah rocha       Echo,  ef  25,  mod  AI.  severe  TR     PICC  line  and  extended  course  of  ab/  needs home  care  set  up       f/p [facility]  dr mikayla dick         rad< from: TTE with Doppler (w/Cont) (07.05.22 @ 09:42) >  Conclusions:  Endocardial visualization enhanced with intravenous  injection of Ultrasonic Enhancing Agent (Definity).  Severe left ventricular enlargement.  Estimated ejection fraction 35%. Diffuse hypokinesis, with  inferior akinesis.  Bioprosthetic mitral valve with normal function.  Right ventricular enlargement with decreased right  ventricular systolic function.  A device wire is noted in the right heart.  ------------------------------------------------------------------------  Confirmed on  7/5/2022 - 12:16:10 by Kristian    < end of copied text >

## 2023-04-01 NOTE — PROGRESS NOTE ADULT - SUBJECTIVE AND OBJECTIVE BOX
CARDIOLOGY     PROGRESS  NOTE   ________________________________________________    CHIEF COMPLAINT:Patient is a 77y old  Male who presents with a chief complaint of leg  swelling (25 Mar 2023 17:39)  no complain, doing better  	  REVIEW OF SYSTEMS:  CONSTITUTIONAL: No fever, weight loss, or fatigue  EYES: No eye pain, visual disturbances, or discharge  ENT:  No difficulty hearing, tinnitus, vertigo; No sinus or throat pain  NECK: No pain or stiffness  RESPIRATORY: No cough, wheezing, chills or hemoptysis; decrease  Shortness of Breath  CARDIOVASCULAR: No chest pain, palpitations, passing out, dizziness, or leg swelling  GASTROINTESTINAL: No abdominal or epigastric pain. No nausea, vomiting, or hematemesis; No diarrhea or constipation. No melena or hematochezia.  GENITOURINARY: No dysuria, frequency, hematuria, or incontinence  NEUROLOGICAL: No headaches, memory loss, loss of strength, numbness, or tremors  SKIN: No itching, burning, rashes, or lesions   LYMPH Nodes: No enlarged glands  ENDOCRINE: No heat or cold intolerance; No hair loss  MUSCULOSKELETAL: No joint pain or swelling; No muscle, back, or extremity pain  PSYCHIATRIC: No depression, anxiety, mood swings, or difficulty sleeping  HEME/LYMPH: No easy bruising, or bleeding gums  ALLERGY AND IMMUNOLOGIC: No hives or eczema	    [ ] All others negative	  [ ] Unable to obtain    PHYSICAL EXAM:  T(C): 36.9 (04-01-23 @ 04:38), Max: 37.1 (04-01-23 @ 00:45)  HR: 73 (04-01-23 @ 04:38) (59 - 90)  BP: 110/67 (04-01-23 @ 04:38) (101/62 - 124/79)  RR: 18 (04-01-23 @ 04:38) (16 - 18)  SpO2: 92% (04-01-23 @ 04:38) (92% - 95%)  Wt(kg): --  I&O's Summary    31 Mar 2023 07:01  -  01 Apr 2023 07:00  --------------------------------------------------------  IN: 50 mL / OUT: 1050 mL / NET: -1000 mL        Appearance: Normal	  HEENT:   Normal oral mucosa, PERRL, EOMI	  Lymphatic: No lymphadenopathy  Cardiovascular: Normal S1 S2, No JVD, + murmurs, No edema  Respiratoryrhonchi  Psychiatry: A & O x 3, Mood & affect appropriate  Gastrointestinal:  Soft, Non-tender, + BS	  Skin: No rashes, No ecchymoses, No cyanosis	  Neurologic: Non-focal  Extremities: Normal range of motion, No clubbing, cyanosis or edema  Vascular: Peripheral pulses palpable 2+ bilaterally    MEDICATIONS  (STANDING):  apixaban 5 milliGRAM(s) Oral two times a day  aspirin  chewable 81 milliGRAM(s) Oral daily  atorvastatin 40 milliGRAM(s) Oral at bedtime  buMETAnide Injectable 1 milliGRAM(s) IV Push daily  ceFAZolin   IVPB 2000 milliGRAM(s) IV Intermittent every 8 hours  levothyroxine 137 MICROGram(s) Oral daily  metoprolol succinate  milliGRAM(s) Oral daily  spironolactone 25 milliGRAM(s) Oral two times a day      TELEMETRY: 	    ECG:  	  RADIOLOGY:  OTHER: 	  	  LABS:	 	    CARDIAC MARKERS:                                10.4   7.80  )-----------( 245      ( 01 Apr 2023 08:33 )             33.0     04-01    138  |  92<L>  |  43<H>  ----------------------------<  134<H>  3.8   |  35<H>  |  1.27    Ca    9.4      01 Apr 2023 07:02  Mg     2.1     03-31      proBNP:   Lipid Profile:   HgA1c:   TSH:       -cont ancef 2 gm iv q8  -repeat bcx negative  -likely source is toe from recent nail clipping   -ERENDIRA negative for vegetations  -s/p PPM extraction and micra placement   -EP input noted  -also with low grade fevers  -picc  -6 weeks iv abx - day 1 of 42 of abx      Assessment and plan  ---------------------------  77y m pmh BPH, Chronic venous insufficiency, HLD Hypothyroidism, S/P CABG x 2, S/P mitral valve replacement, SP Status post angioplasty, Sp TKR rt, Anasarca Pt comes to ed c/o shortness of breath for past month w progressive worsening of anasarca w swelling of scrotum and diff urinating, Has been noncompliant on diuretics for past two weeks w 20lb weight gain over past month. No fever and chills, sputum, nvdc, cp. PE Adult male lying stretcher w maked swelling to josh lower extr, w pitting edema,  chest  scant josh crackles w slight wheeze and prolonged exp phase  chf acute on chronic sec to RV dysfunction  start on lasix drip, strict i/o  fu lytes  tele, AFib check HR  a,fib , continue AC  will adjust cardiac meds  add aldactone 25 mg bid  +BC i bottle, MSSA   abx as per ID  s/p PPM and LEAD extraction  started  on AC sec to a.fib   echo noted with decrease in EF will adjust cardiac meds  start on iv bumex, awaiting lytes  repeat cbc post procedure  will add losartan or Entresto as tolerated  oob too chair  PICC line

## 2023-04-01 NOTE — PROGRESS NOTE ADULT - ASSESSMENT
76 y/o M with PMHX significant for permanent AF for 6-7 years (on Eliquis), CAD S/P CABG/MVR 2021, SALONI 2007, S/p right TKR, BPH, chronic venous insufficiency, HLD, HTN, hypothyroidism, varicose veins with stripping, infection of right foot 2019, L TKR with multiple infections post op, Medtronic dual chamber PPM 2012 (generator change 2021).  He comes to ED with c/o shortness of breath for past month with progressive worsening of anasarca with swelling of scrotum and difficulty with urinating.  Admits to noncompliance with diuretics for past two weeks with 25lb weight gain over past month.  Patient states that he had recent toe nail clipping by Podiatrist approximately 1 week prior to admission.  He states that his toe has had pain and bleeding since nail clipping with RLE erythema.  Denies fevers at home but states he had fever Saturday of 103F.  Admits to improvement in lower extremity edema since admission.     1. MSSA Bacteremia, admitted with fever and leukocytosis   2. HFrEF exacerbation with EF 35%  3. Permanent, chronic AF    - S/p pacemaker extraction with Micra implant 3/30  - Post operative instructions reviewed w/ patient and girlfriend at bedside. PPM ID card/booklet and wound check appointment given. 4/12/23 11:40AM   - Patient is in AF, not pacer dependent.  - Continue Eliquis 5mg BID  - Continue on IV Ancef per ID. 1/4 culture positive for Staph aureus, follow up repeat cultures/ID recs. ? Need for PICC   - EP will sign off at this time. Reconsult PRN.    Anabella Deshpande PA-C  #[phone]

## 2023-04-01 NOTE — PROGRESS NOTE ADULT - SUBJECTIVE AND OBJECTIVE BOX
afberile    REVIEW OF SYSTEMS:  CONSTITUTIONAL: No fever,  no  weight loss  ENT:  No  tinnitus,   no   vertigo  NECK: No pain or stiffness  RESPIRATORY: No cough, wheezing, chills or hemoptysis;    No Shortness of Breath  CARDIOVASCULAR: No chest pain, palpitations, dizziness  GASTROINTESTINAL: No abdominal or epigastric pain. No nausea, vomiting, or hematemesis; No diarrhea  No melena or hematochezia.  GENITOURINARY: No dysuria, frequency, hematuria, or incontinence  NEUROLOGICAL: No headaches  SKIN: No itching,  no   rash  LYMPH Nodes: No enlarged glands  ENDOCRINE: No heat or cold intolerance  MUSCULOSKELETAL: No joint pain or swelling  PSYCHIATRIC: No depression, anxiety  HEME/LYMPH: No easy bruising, or bleeding gums  ALLERGY AND IMMUNOLOGIC: No hives or eczema	    MEDICATIONS  (STANDING):  apixaban 5 milliGRAM(s) Oral two times a day  aspirin  chewable 81 milliGRAM(s) Oral daily  atorvastatin 40 milliGRAM(s) Oral at bedtime  buMETAnide Injectable 1 milliGRAM(s) IV Push daily  ceFAZolin   IVPB 2000 milliGRAM(s) IV Intermittent every 8 hours  levothyroxine 137 MICROGram(s) Oral daily  metoprolol succinate  milliGRAM(s) Oral daily  spironolactone 25 milliGRAM(s) Oral two times a day    MEDICATIONS  (PRN):  acetaminophen     Tablet .. 650 milliGRAM(s) Oral every 6 hours PRN Mild Pain (1 - 3)  oxyCODONE    IR 5 milliGRAM(s) Oral every 6 hours PRN Moderate Pain (4 - 6)      Vital Signs Last 24 Hrs  T(C): 36.9 (01 Apr 2023 04:38), Max: 37.1 (01 Apr 2023 00:45)  T(F): 98.5 (01 Apr 2023 04:38), Max: 98.7 (01 Apr 2023 00:45)  HR: 73 (01 Apr 2023 04:38) (59 - 90)  BP: 110/67 (01 Apr 2023 04:38) (101/62 - 124/79)  BP(mean): --  RR: 18 (01 Apr 2023 04:38) (16 - 18)  SpO2: 92% (01 Apr 2023 04:38) (92% - 95%)    Parameters below as of 01 Apr 2023 04:38  Patient On (Oxygen Delivery Method): room air      CAPILLARY BLOOD GLUCOSE        I&O's Summary    31 Mar 2023 07:01  -  01 Apr 2023 07:00  --------------------------------------------------------  IN: 50 mL / OUT: 1050 mL / NET: -1000 mL          Appearance: Normal	  HEENT:   Normal oral mucosa, PERRL, EOMI	  Lymphatic: No lymphadenopathy  Cardiovascular: Normal S1 S2, No JVD  Respiratory: Lungs clear to auscultation	  Psychiatry: A & O x 3, Mood & affect appropriate  Gastrointestinal:  Soft, Non-tender, + BS	  Skin: No rash, No ecchymoses	  Extremities: Normal range of motion  Vascular: Peripheral pulses palpable bilaterally    LABS:                        11.0   8.26  )-----------( 209      ( 31 Mar 2023 06:57 )             33.6     04-01    138  |  92<L>  |  43<H>  ----------------------------<  134<H>  3.8   |  35<H>  |  1.27    Ca    9.4      01 Apr 2023 07:02  Mg     2.1     03-31                              Consultant(s) Notes Reviewed:      Care Discussed with Consultants/Other Providers:

## 2023-04-02 LAB
ANION GAP SERPL CALC-SCNC: 11 MMOL/L — SIGNIFICANT CHANGE UP (ref 5–17)
BUN SERPL-MCNC: 55 MG/DL — HIGH (ref 7–23)
CALCIUM SERPL-MCNC: 9.8 MG/DL — SIGNIFICANT CHANGE UP (ref 8.4–10.5)
CHLORIDE SERPL-SCNC: 93 MMOL/L — LOW (ref 96–108)
CO2 SERPL-SCNC: 33 MMOL/L — HIGH (ref 22–31)
CREAT SERPL-MCNC: 1.5 MG/DL — HIGH (ref 0.5–1.3)
CULTURE RESULTS: SIGNIFICANT CHANGE UP
CULTURE RESULTS: SIGNIFICANT CHANGE UP
EGFR: 48 ML/MIN/1.73M2 — LOW
GLUCOSE SERPL-MCNC: 191 MG/DL — HIGH (ref 70–99)
MAGNESIUM SERPL-MCNC: 2 MG/DL — SIGNIFICANT CHANGE UP (ref 1.6–2.6)
POTASSIUM SERPL-MCNC: 4.4 MMOL/L — SIGNIFICANT CHANGE UP (ref 3.5–5.3)
POTASSIUM SERPL-SCNC: 4.4 MMOL/L — SIGNIFICANT CHANGE UP (ref 3.5–5.3)
SODIUM SERPL-SCNC: 137 MMOL/L — SIGNIFICANT CHANGE UP (ref 135–145)
SPECIMEN SOURCE: SIGNIFICANT CHANGE UP
SPECIMEN SOURCE: SIGNIFICANT CHANGE UP

## 2023-04-02 RX ORDER — SENNA PLUS 8.6 MG/1
2 TABLET ORAL AT BEDTIME
Refills: 0 | Status: DISCONTINUED | OUTPATIENT
Start: 2023-04-02 | End: 2023-04-17

## 2023-04-02 RX ORDER — POLYETHYLENE GLYCOL 3350 17 G/17G
17 POWDER, FOR SOLUTION ORAL DAILY
Refills: 0 | Status: DISCONTINUED | OUTPATIENT
Start: 2023-04-02 | End: 2023-04-17

## 2023-04-02 RX ADMIN — Medication 81 MILLIGRAM(S): at 17:36

## 2023-04-02 RX ADMIN — Medication 100 MILLIGRAM(S): at 14:07

## 2023-04-02 RX ADMIN — ATORVASTATIN CALCIUM 40 MILLIGRAM(S): 80 TABLET, FILM COATED ORAL at 22:35

## 2023-04-02 RX ADMIN — Medication 137 MICROGRAM(S): at 06:14

## 2023-04-02 RX ADMIN — Medication 100 MILLIGRAM(S): at 22:30

## 2023-04-02 RX ADMIN — Medication 100 MILLIGRAM(S): at 12:30

## 2023-04-02 RX ADMIN — SPIRONOLACTONE 25 MILLIGRAM(S): 25 TABLET, FILM COATED ORAL at 06:15

## 2023-04-02 RX ADMIN — Medication 100 MILLIGRAM(S): at 07:16

## 2023-04-02 RX ADMIN — APIXABAN 5 MILLIGRAM(S): 2.5 TABLET, FILM COATED ORAL at 17:36

## 2023-04-02 RX ADMIN — BUMETANIDE 1 MILLIGRAM(S): 0.25 INJECTION INTRAMUSCULAR; INTRAVENOUS at 06:15

## 2023-04-02 RX ADMIN — SENNA PLUS 2 TABLET(S): 8.6 TABLET ORAL at 22:35

## 2023-04-02 RX ADMIN — SPIRONOLACTONE 25 MILLIGRAM(S): 25 TABLET, FILM COATED ORAL at 17:36

## 2023-04-02 RX ADMIN — POLYETHYLENE GLYCOL 3350 17 GRAM(S): 17 POWDER, FOR SOLUTION ORAL at 12:31

## 2023-04-02 RX ADMIN — APIXABAN 5 MILLIGRAM(S): 2.5 TABLET, FILM COATED ORAL at 06:15

## 2023-04-02 NOTE — PROGRESS NOTE ADULT - SUBJECTIVE AND OBJECTIVE BOX
afebrile    REVIEW OF SYSTEMS:  CONSTITUTIONAL: No fever,  no  weight loss  ENT:  No  tinnitus,   no   vertigo  NECK: No pain or stiffness  RESPIRATORY: No cough, wheezing, chills or hemoptysis;    No Shortness of Breath  CARDIOVASCULAR: No chest pain, palpitations, dizziness  GASTROINTESTINAL: No abdominal or epigastric pain. No nausea, vomiting, or hematemesis; No diarrhea  No melena or hematochezia.  GENITOURINARY: No dysuria, frequency, hematuria, or incontinence  NEUROLOGICAL: No headaches  SKIN: No itching,  no   rash  LYMPH Nodes: No enlarged glands  ENDOCRINE: No heat or cold intolerance  MUSCULOSKELETAL: No joint pain or swelling  PSYCHIATRIC: No depression, anxiety  HEME/LYMPH: No easy bruising, or bleeding gums  ALLERGY AND IMMUNOLOGIC: No hives or eczema	    MEDICATIONS  (STANDING):  apixaban 5 milliGRAM(s) Oral two times a day  aspirin  chewable 81 milliGRAM(s) Oral daily  atorvastatin 40 milliGRAM(s) Oral at bedtime  buMETAnide Injectable 1 milliGRAM(s) IV Push daily  ceFAZolin   IVPB 2000 milliGRAM(s) IV Intermittent every 8 hours  levothyroxine 137 MICROGram(s) Oral daily  metoprolol succinate  milliGRAM(s) Oral daily  spironolactone 25 milliGRAM(s) Oral two times a day    MEDICATIONS  (PRN):  acetaminophen     Tablet .. 650 milliGRAM(s) Oral every 6 hours PRN Mild Pain (1 - 3)  oxyCODONE    IR 5 milliGRAM(s) Oral every 6 hours PRN Moderate Pain (4 - 6)      Vital Signs Last 24 Hrs  T(C): 36.9 (02 Apr 2023 04:09), Max: 37.3 (01 Apr 2023 20:40)  T(F): 98.5 (02 Apr 2023 04:09), Max: 99.2 (01 Apr 2023 20:40)  HR: 68 (02 Apr 2023 04:09) (68 - 85)  BP: 101/64 (02 Apr 2023 09:00) (101/64 - 112/72)  BP(mean): --  RR: 18 (02 Apr 2023 04:09) (17 - 18)  SpO2: 97% (02 Apr 2023 04:09) (90% - 97%)    Parameters below as of 02 Apr 2023 04:09  Patient On (Oxygen Delivery Method): nasal cannula      CAPILLARY BLOOD GLUCOSE        I&O's Summary    01 Apr 2023 07:01  -  02 Apr 2023 07:00  --------------------------------------------------------  IN: 440 mL / OUT: 1500 mL / NET: -1060 mL          Appearance: Normal	  HEENT:   Normal oral mucosa, PERRL, EOMI	  Lymphatic: No lymphadenopathy  Cardiovascular: Normal S1 S2, No JVD  Respiratory: Lungs clear to auscultation	  Psychiatry: A & O x 3, Mood & affect appropriate  Gastrointestinal:  Soft, Non-tender, + BS	  Skin: No rash, No ecchymoses	  Extremities: Normal range of motion  Vascular: Peripheral pulses palpable bilaterally    LABS:                        10.4   7.80  )-----------( 245      ( 01 Apr 2023 08:33 )             33.0     04-01    138  |  92<L>  |  43<H>  ----------------------------<  134<H>  3.8   |  35<H>  |  1.27    Ca    9.4      01 Apr 2023 07:02                              Consultant(s) Notes Reviewed:      Care Discussed with Consultants/Other Providers:

## 2023-04-02 NOTE — PROGRESS NOTE ADULT - SUBJECTIVE AND OBJECTIVE BOX
CARDIOLOGY     PROGRESS  NOTE   ________________________________________________    CHIEF COMPLAINT:Patient is a 77y old  Male who presents with a chief complaint of leg  swelling (25 Mar 2023 17:39)  comfortable  	  REVIEW OF SYSTEMS:  CONSTITUTIONAL: No fever, weight loss, or fatigue  EYES: No eye pain, visual disturbances, or discharge  ENT:  No difficulty hearing, tinnitus, vertigo; No sinus or throat pain  NECK: No pain or stiffness  RESPIRATORY: No cough, wheezing, chills or hemoptysis; No Shortness of Breath  CARDIOVASCULAR: No chest pain, palpitations, passing out, dizziness, or leg swelling  GASTROINTESTINAL: No abdominal or epigastric pain. No nausea, vomiting, or hematemesis; No diarrhea or constipation. No melena or hematochezia.  GENITOURINARY: No dysuria, frequency, hematuria, or incontinence  NEUROLOGICAL: No headaches, memory loss, loss of strength, numbness, or tremors  SKIN: No itching, burning, rashes, or lesions   LYMPH Nodes: No enlarged glands  ENDOCRINE: No heat or cold intolerance; No hair loss  MUSCULOSKELETAL: No joint pain or swelling; No muscle, back, or extremity pain  PSYCHIATRIC: No depression, anxiety, mood swings, or difficulty sleeping  HEME/LYMPH: No easy bruising, or bleeding gums  ALLERGY AND IMMUNOLOGIC: No hives or eczema	    [ x] All others negative	  [ ] Unable to obtain    PHYSICAL EXAM:  T(C): 36.9 (04-02-23 @ 04:09), Max: 37.3 (04-01-23 @ 20:40)  HR: 68 (04-02-23 @ 04:09) (68 - 85)  BP: 103/66 (04-02-23 @ 04:09) (103/66 - 112/72)  RR: 18 (04-02-23 @ 04:09) (17 - 18)  SpO2: 97% (04-02-23 @ 04:09) (90% - 97%)  Wt(kg): --  I&O's Summary    01 Apr 2023 07:01  -  02 Apr 2023 07:00  --------------------------------------------------------  IN: 440 mL / OUT: 1500 mL / NET: -1060 mL        Appearance: Normal	  HEENT:   Normal oral mucosa, PERRL, EOMI	  Lymphatic: No lymphadenopathy  Cardiovascular: Normal S1 S2, No JVD, + murmurs, decrease edema  Respiratory:rhonchi  Psychiatry: A & O x 3, Mood & affect appropriate  Gastrointestinal:  Soft, Non-tender, + BS	  Skin: No rashes, No ecchymoses, No cyanosis	  Neurologic: Non-focal  Extremities: Normal range of motion, No clubbing, cyanosis , +decrease  edema  Vascular: Peripheral pulses palpable 2+ bilaterally    MEDICATIONS  (STANDING):  apixaban 5 milliGRAM(s) Oral two times a day  aspirin  chewable 81 milliGRAM(s) Oral daily  atorvastatin 40 milliGRAM(s) Oral at bedtime  buMETAnide Injectable 1 milliGRAM(s) IV Push daily  ceFAZolin   IVPB 2000 milliGRAM(s) IV Intermittent every 8 hours  levothyroxine 137 MICROGram(s) Oral daily  metoprolol succinate  milliGRAM(s) Oral daily  spironolactone 25 milliGRAM(s) Oral two times a day      TELEMETRY: 	    ECG:  	  RADIOLOGY:  OTHER: 	  	  LABS:	 	    CARDIAC MARKERS:                                10.4   7.80  )-----------( 245      ( 01 Apr 2023 08:33 )             33.0     04-01    138  |  92<L>  |  43<H>  ----------------------------<  134<H>  3.8   |  35<H>  |  1.27    Ca    9.4      01 Apr 2023 07:02      proBNP:   Lipid Profile:   HgA1c:   TSH:     -cont ancef 2 gm iv q8  -repeat bcx negative  -likely source is toe from recent nail clipping   -ERENDIRA negative for vegetations  -s/p PPM extraction and micra placement   -EP input noted  -also with low grade fevers  -picc  -6 weeks iv abx - day 1 of 42 of abx    Assessment and plan  ---------------------------    77y m pmh BPH, Chronic venous insufficiency, HLD Hypothyroidism, S/P CABG x 2, S/P mitral valve replacement, SP Status post angioplasty, Sp TKR rt, Anasarca Pt comes to ed c/o shortness of breath for past month w progressive worsening of anasarca w swelling of scrotum and diff urinating, Has been noncompliant on diuretics for past two weeks w 20lb weight gain over past month. No fever and chills, sputum, nvdc, cp. PE Adult male lying stretcher w maked swelling to josh lower extr, w pitting edema,  chest  scant josh crackles w slight wheeze and prolonged exp phase  chf acute on chronic sec to RV dysfunction  start on lasix drip, strict i/o  fu lytes  tele, AFib check HR  a,fib , continue AC  will adjust cardiac meds  add aldactone 25 mg bid  +BC i bottle, MSSA   abx as per ID  s/p PPM and LEAD extraction  started  on AC sec to a.fib   echo noted with decrease in EF will adjust cardiac meds  start on iv bumex, awaiting lytes  repeat cbc post procedure  will add losartan or Entresto as tolerated  oob too chair  PICC line needs 6 weeks of abx  fu lytes/ bicarb in am

## 2023-04-02 NOTE — PROGRESS NOTE ADULT - ASSESSMENT
77  year old male    h/o  CAD s/p stent , asa.      A-fib/ PPM, , hypothyroid, and total left knee replacement    prior  PPM  interrogation  with  [facility]/   s/p  brief   bursts  of  afib/ , on prior visit   Ct chest, retrosternal hematoma.  mod left pl effusion    was  on bumex/  aldactone        *   admitted with    wprsening   pedal  edema        component  of  cellulitis   and  from  c/c  diastolic  chf,/  h/o  l/edema  of L  leg        pt  admits to  being  non complaint with  meds        *   h/o    c/c AFIB,   has  PPM         on eliquis     *    Anemia, , hb stable, had  been seen by  gi  eval dr joann sanchez in past     *    CAD,     cath, with 2 vessel disease,  s/p  CABG and  MVR  surg d r marni   *      h/o  Afib on    eliquis          c/c leg redness/  4 +  edema, on keflex, has  improved    *     echo,  on 7/2022,  ef  35/ new/ severe  TR          cath,   was non  obstructive    *    on synthroid          on asa/  torpol/  eliquis          crt  noted,   from  lasix// aldactone.  leg  swelling  is  lessening   *   MSSA  bacterinia         on iv ancef.  ,  rpt bcx    are  negative        explant pf  ppm  and  Micra  placement on  3/30/23. dr sunitha rocha       Echo,  ef  25,  mod  AI.  severe  TR     PICC  line  and  extended  course  of  ab/  needs home  care  set  up   on iv  bumex./ aldactone   ha s less  edema       f/p wih  dr mikayla dick         rad< from: TTE with Doppler (w/Cont) (07.05.22 @ 09:42) >  Conclusions:  Endocardial visualization enhanced with intravenous  injection of Ultrasonic Enhancing Agent (Definity).  Severe left ventricular enlargement.  Estimated ejection fraction 35%. Diffuse hypokinesis, with  inferior akinesis.  Bioprosthetic mitral valve with normal function.  Right ventricular enlargement with decreased right  ventricular systolic function.  A device wire is noted in the right heart.  ------------------------------------------------------------------------  Confirmed on  7/5/2022 - 12:16:10 by Kristian    < end of copied text >

## 2023-04-03 ENCOUNTER — TRANSCRIPTION ENCOUNTER (OUTPATIENT)
Age: 78
End: 2023-04-03

## 2023-04-03 LAB
ANION GAP SERPL CALC-SCNC: 10 MMOL/L — SIGNIFICANT CHANGE UP (ref 5–17)
ANION GAP SERPL CALC-SCNC: 11 MMOL/L — SIGNIFICANT CHANGE UP (ref 5–17)
APPEARANCE UR: ABNORMAL
BACTERIA # UR AUTO: NEGATIVE — SIGNIFICANT CHANGE UP
BILIRUB UR-MCNC: NEGATIVE — SIGNIFICANT CHANGE UP
BUN SERPL-MCNC: 66 MG/DL — HIGH (ref 7–23)
BUN SERPL-MCNC: 67 MG/DL — HIGH (ref 7–23)
CALCIUM SERPL-MCNC: 9.9 MG/DL — SIGNIFICANT CHANGE UP (ref 8.4–10.5)
CALCIUM SERPL-MCNC: 9.9 MG/DL — SIGNIFICANT CHANGE UP (ref 8.4–10.5)
CHLORIDE SERPL-SCNC: 91 MMOL/L — LOW (ref 96–108)
CHLORIDE SERPL-SCNC: 92 MMOL/L — LOW (ref 96–108)
CO2 SERPL-SCNC: 32 MMOL/L — HIGH (ref 22–31)
CO2 SERPL-SCNC: 33 MMOL/L — HIGH (ref 22–31)
COLOR SPEC: SIGNIFICANT CHANGE UP
CREAT SERPL-MCNC: 1.98 MG/DL — HIGH (ref 0.5–1.3)
CREAT SERPL-MCNC: 2.25 MG/DL — HIGH (ref 0.5–1.3)
DIFF PNL FLD: ABNORMAL
EGFR: 29 ML/MIN/1.73M2 — LOW
EGFR: 34 ML/MIN/1.73M2 — LOW
EPI CELLS # UR: 0 /HPF — SIGNIFICANT CHANGE UP
GLUCOSE SERPL-MCNC: 157 MG/DL — HIGH (ref 70–99)
GLUCOSE SERPL-MCNC: 187 MG/DL — HIGH (ref 70–99)
GLUCOSE UR QL: ABNORMAL
HYALINE CASTS # UR AUTO: 0 /LPF — SIGNIFICANT CHANGE UP (ref 0–2)
KETONES UR-MCNC: NEGATIVE — SIGNIFICANT CHANGE UP
LEUKOCYTE ESTERASE UR-ACNC: NEGATIVE — SIGNIFICANT CHANGE UP
NITRITE UR-MCNC: NEGATIVE — SIGNIFICANT CHANGE UP
PH UR: 6.5 — SIGNIFICANT CHANGE UP (ref 5–8)
POTASSIUM SERPL-MCNC: 4.5 MMOL/L — SIGNIFICANT CHANGE UP (ref 3.5–5.3)
POTASSIUM SERPL-MCNC: 4.7 MMOL/L — SIGNIFICANT CHANGE UP (ref 3.5–5.3)
POTASSIUM SERPL-SCNC: 4.5 MMOL/L — SIGNIFICANT CHANGE UP (ref 3.5–5.3)
POTASSIUM SERPL-SCNC: 4.7 MMOL/L — SIGNIFICANT CHANGE UP (ref 3.5–5.3)
PROT UR-MCNC: >600
RBC CASTS # UR COMP ASSIST: >50 /HPF — HIGH (ref 0–4)
SODIUM SERPL-SCNC: 134 MMOL/L — LOW (ref 135–145)
SODIUM SERPL-SCNC: 135 MMOL/L — SIGNIFICANT CHANGE UP (ref 135–145)
SP GR SPEC: 1.02 — SIGNIFICANT CHANGE UP (ref 1.01–1.02)
UROBILINOGEN FLD QL: NEGATIVE — SIGNIFICANT CHANGE UP
WBC UR QL: 5 /HPF — SIGNIFICANT CHANGE UP (ref 0–5)

## 2023-04-03 PROCEDURE — 99232 SBSQ HOSP IP/OBS MODERATE 35: CPT

## 2023-04-03 RX ORDER — BUMETANIDE 0.25 MG/ML
1 INJECTION INTRAMUSCULAR; INTRAVENOUS DAILY
Refills: 0 | Status: DISCONTINUED | OUTPATIENT
Start: 2023-04-03 | End: 2023-04-04

## 2023-04-03 RX ORDER — SPIRONOLACTONE 25 MG/1
25 TABLET, FILM COATED ORAL DAILY
Refills: 0 | Status: DISCONTINUED | OUTPATIENT
Start: 2023-04-03 | End: 2023-04-04

## 2023-04-03 RX ADMIN — OXYCODONE HYDROCHLORIDE 5 MILLIGRAM(S): 5 TABLET ORAL at 22:06

## 2023-04-03 RX ADMIN — Medication 100 MILLIGRAM(S): at 06:24

## 2023-04-03 RX ADMIN — Medication 650 MILLIGRAM(S): at 06:22

## 2023-04-03 RX ADMIN — Medication 81 MILLIGRAM(S): at 17:44

## 2023-04-03 RX ADMIN — OXYCODONE HYDROCHLORIDE 5 MILLIGRAM(S): 5 TABLET ORAL at 22:30

## 2023-04-03 RX ADMIN — BUMETANIDE 1 MILLIGRAM(S): 0.25 INJECTION INTRAMUSCULAR; INTRAVENOUS at 06:25

## 2023-04-03 RX ADMIN — Medication 137 MICROGRAM(S): at 06:25

## 2023-04-03 RX ADMIN — APIXABAN 5 MILLIGRAM(S): 2.5 TABLET, FILM COATED ORAL at 06:24

## 2023-04-03 RX ADMIN — Medication 100 MILLIGRAM(S): at 13:43

## 2023-04-03 RX ADMIN — Medication 100 MILLIGRAM(S): at 22:00

## 2023-04-03 RX ADMIN — ATORVASTATIN CALCIUM 40 MILLIGRAM(S): 80 TABLET, FILM COATED ORAL at 22:00

## 2023-04-03 RX ADMIN — SENNA PLUS 2 TABLET(S): 8.6 TABLET ORAL at 22:00

## 2023-04-03 RX ADMIN — APIXABAN 5 MILLIGRAM(S): 2.5 TABLET, FILM COATED ORAL at 17:45

## 2023-04-03 RX ADMIN — SPIRONOLACTONE 25 MILLIGRAM(S): 25 TABLET, FILM COATED ORAL at 06:22

## 2023-04-03 RX ADMIN — Medication 100 MILLIGRAM(S): at 06:22

## 2023-04-03 NOTE — PROGRESS NOTE ADULT - SUBJECTIVE AND OBJECTIVE BOX
afebriel/  no complaints  REVIEW OF SYSTEMS:  CONSTITUTIONAL: No fever,  no  weight loss  ENT:  No  tinnitus,   no   vertigo  NECK: No pain or stiffness  RESPIRATORY: No cough, wheezing, chills or hemoptysis;    No Shortness of Breath  CARDIOVASCULAR: No chest pain, palpitations, dizziness  GASTROINTESTINAL: No abdominal or epigastric pain. No nausea, vomiting, or hematemesis; No diarrhea  No melena or hematochezia.  GENITOURINARY: No dysuria, frequency, hematuria, or incontinence  NEUROLOGICAL: No headaches  SKIN: No itching,  no   rash  LYMPH Nodes: No enlarged glands  ENDOCRINE: No heat or cold intolerance  MUSCULOSKELETAL: No joint pain or swelling  PSYCHIATRIC: No depression, anxiety  HEME/LYMPH: No easy bruising, or bleeding gums  ALLERGY AND IMMUNOLOGIC: No hives or eczema	    MEDICATIONS  (STANDING):  apixaban 5 milliGRAM(s) Oral two times a day  aspirin  chewable 81 milliGRAM(s) Oral daily  atorvastatin 40 milliGRAM(s) Oral at bedtime  buMETAnide 1 milliGRAM(s) Oral daily  ceFAZolin   IVPB 2000 milliGRAM(s) IV Intermittent every 8 hours  levothyroxine 137 MICROGram(s) Oral daily  metoprolol succinate  milliGRAM(s) Oral daily  polyethylene glycol 3350 17 Gram(s) Oral daily  senna 2 Tablet(s) Oral at bedtime  spironolactone 25 milliGRAM(s) Oral daily    MEDICATIONS  (PRN):  acetaminophen     Tablet .. 650 milliGRAM(s) Oral every 6 hours PRN Mild Pain (1 - 3)  bisacodyl Suppository 10 milliGRAM(s) Rectal daily PRN Constipation  oxyCODONE    IR 5 milliGRAM(s) Oral every 6 hours PRN Moderate Pain (4 - 6)      Vital Signs Last 24 Hrs  T(C): 36.7 (2023 04:22), Max: 37.4 (2023 12:20)  T(F): 98.1 (2023 04:22), Max: 99.3 (2023 12:20)  HR: 73 (2023 04:22) (73 - 92)  BP: 127/73 (2023 04:22) (127/73 - 138/80)  BP(mean): --  RR: 18 (2023 04:22) (18 - 18)  SpO2: 94% (2023 04:22) (94% - 94%)    Parameters below as of 2023 04:22  Patient On (Oxygen Delivery Method): room air      CAPILLARY BLOOD GLUCOSE        I&O's Summary    2023 07:01  -  2023 07:00  --------------------------------------------------------  IN: 240 mL / OUT: 800 mL / NET: -560 mL          Appearance: Normal	  HEENT:   Normal oral mucosa, PERRL, EOMI	  Lymphatic: No lymphadenopathy  Cardiovascular: Normal S1 S2, No JVD  Respiratory: Lungs clear to auscultation	  Psychiatry: A & O x 3, Mood & affect appropriate  Gastrointestinal:  Soft, Non-tender, + BS	  Skin: No rash, No ecchymoses	  Extremities: Normal range of motion  Vascular: Peripheral pulses palpable bilaterally    LABS:        134<L>  |  91<L>  |  67<H>  ----------------------------<  187<H>  4.7   |  33<H>  |  2.25<H>    Ca    9.9      2023 10:28  Mg     2.0     04-            Urinalysis Basic - ( 2023 07:49 )    Color: BROWN / Appearance: Turbid / S.024 / pH: x  Gluc: x / Ketone: Negative  / Bili: Negative / Urobili: Negative   Blood: x / Protein: >600 / Nitrite: Negative   Leuk Esterase: Negative / RBC: >50 /hpf / WBC 5 /HPF   Sq Epi: x / Non Sq Epi: 0 /hpf / Bacteria: Negative                      Consultant(s) Notes Reviewed:      Care Discussed with Consultants/Other Providers:

## 2023-04-03 NOTE — DISCHARGE NOTE PROVIDER - NSDCFUSCHEDAPPT_GEN_ALL_CORE_FT
[place] [facility]  [address]  Scheduled Appointment: 04/12/2023    PEDRO [facility]SE  [place] [facility]  [address]  Scheduled Appointment: 06/22/2023     PEDRO [facility] Physician [facility]  [address]  Scheduled Appointment: 06/22/2023

## 2023-04-03 NOTE — PROGRESS NOTE ADULT - SUBJECTIVE AND OBJECTIVE BOX
JANEE ARCEO 77y [id number]    Patient is a 77y old  Male who presents with a chief complaint of leg  swelling (25 Mar 2023 17:39)      Follow Up/CC:  ID following for bacteremia    Interval History/ROS: no fever, SOB better     Allergies    alfuzosin (Hives)  levofloxacin (Hives)  Myrbetriq (Hives)  tamsulosin (Hives)    Intolerances        ANTIMICROBIALS:  ceFAZolin   IVPB 2000 every 8 hours      MEDICATIONS  (STANDING):  apixaban 5 milliGRAM(s) Oral two times a day  aspirin  chewable 81 milliGRAM(s) Oral daily  atorvastatin 40 milliGRAM(s) Oral at bedtime  buMETAnide 1 milliGRAM(s) Oral daily  ceFAZolin   IVPB 2000 milliGRAM(s) IV Intermittent every 8 hours  levothyroxine 137 MICROGram(s) Oral daily  metoprolol succinate  milliGRAM(s) Oral daily  polyethylene glycol 3350 17 Gram(s) Oral daily  senna 2 Tablet(s) Oral at bedtime  spironolactone 25 milliGRAM(s) Oral daily    MEDICATIONS  (PRN):  acetaminophen     Tablet .. 650 milliGRAM(s) Oral every 6 hours PRN Mild Pain (1 - 3)  oxyCODONE    IR 5 milliGRAM(s) Oral every 6 hours PRN Moderate Pain (4 - 6)        Vital Signs Last 24 Hrs  T(C): 36.7 (2023 04:22), Max: 37.4 (2023 12:20)  T(F): 98.1 (2023 04:22), Max: 99.3 (2023 12:20)  HR: 73 (2023 04:22) (73 - 92)  BP: 127/73 (2023 04:22) (127/73 - 138/80)  BP(mean): --  RR: 18 (2023 04:22) (18 - 18)  SpO2: 94% (2023 04:22) (94% - 94%)    Parameters below as of 2023 04:22  Patient On (Oxygen Delivery Method): room air          04-    134<L>  |  91<L>  |  67<H>  ----------------------------<  187<H>  4.7   |  33<H>  |  2.25<H>    Ca    9.9      2023 10:28  Mg     2.0     04-02        Urinalysis Basic - ( 2023 07:49 )    Color: BROWN / Appearance: Turbid / S.024 / pH: x  Gluc: x / Ketone: Negative  / Bili: Negative / Urobili: Negative   Blood: x / Protein: >600 / Nitrite: Negative   Leuk Esterase: Negative / RBC: >50 /hpf / WBC 5 /HPF   Sq Epi: x / Non Sq Epi: 0 /hpf / Bacteria: Negative        MICROBIOLOGY:  .Other Other  23   Culture is being performed. Fungal cultures are held for 4 weeks.  --  --      .Blood Blood-Peripheral  23   No Growth Final  --  --      .Blood  23   Growth in anaerobic bottle: Staphylococcus aureus  ***Blood Panel PCR results on this specimen are available  approximately 3 hours after the Gram stain result.***  Gram stain, PCR, and/or culture results may not always  correspond due to difference in methodologies.  ************************************************************  This PCR assay was performed by multiplex PCR. This  Assay tests for 66 bacterial and resistance gene targets.  Please refer to the [facility] test directory  at [email or web address] for details.  --  Blood Culture PCR  Staphylococcus aureus      .Blood  23   No Growth Final  --  --              v            RADIOLOGY

## 2023-04-03 NOTE — DISCHARGE NOTE PROVIDER - HOSPITAL COURSE
77y m pmh Afib previously on Eliquis, BPH, Chronic venous insufficiency, HLD Hypothyroidism, S/P CABG x 2, S/P mitral valve replacement, SP Status post angioplasty, Sp TKR rt comes to ed c/o shortness of breath for past month w progressive worsening of anasarca w swelling of scrotum. Admitted with acute on chronic CHF  sec to RV dysfunction; started on lasix drip.  Hosp course complicated with  MSSA bacteremia likely from left 1st recent toenail clipping and bleeding and swelling ID appreciated. Started on Ancef 2 gm iv q8TEE . ERENDIRA negative for vegetations. s/p PPM extraction and micra placement on 3/30. Per ID will need 6 weeks iv abx - PICC placed  TTE also noted with sif decrease in EF 25 % and severe TR. Started on IV bumex       Course further complicated with new BEULAH. ? AIN in the setting of Cefazolin Vs  overdiuresis and volume depletion. antibiotic  was  switched  to iv vancomycin  per  ID. Renal sonogram demonstrating a horseshoe kidney without evidence of hydronephrosis or renal calculi. Knapp catheter in place, with small amount of bloody urine. Pt euvolemic on examination. Diuretics held and started  hydration. Serologic GN workup neg. Despite measures needed to initiate HD on 4/10. Underwent kidney biopsy on 4/14. path consistent with  broad differential 1) post infectious glomerulitis; 2) monoclonal immunoglobulin deposition disease with lambda restriction; and 3) an autoimmune process .   primary interstitial disease c/w PYELONEPHRITIS will need for prolonged antibiotic rx. Hem consulted for  immunoglobulin deposition/IgA lamda  type.  Per heme, unlikely to need  bone marrow  bx . Pt now HD dependant.  Hypotension on HD- started midodrine. s/p permacath placement . End day of vancomycin is 5/12/23.    Medically cleared for discharge to [facility]. Will need Hematology, Renal, cardiology and PCP follow up                                               77y m pmh Afib previously on Eliquis, BPH, Chronic venous insufficiency, HLD Hypothyroidism, S/P CABG x 2, S/P mitral valve replacement, SP Status post angioplasty, Sp TKR rt comes to ed c/o shortness of breath for past month w progressive worsening of anasarca w swelling of scrotum. Admitted with acute on chronic CHF  sec to RV dysfunction; started on lasix drip.  Hosp course complicated with  MSSA bacteremia likely from left 1st recent toenail clipping and bleeding and swelling ID appreciated. Started on Ancef 2 gm iv q8TEE . ERENDIRA negative for vegetations. s/p PPM extraction and micra placement on 3/30. Per ID will need 6 weeks iv abx - PICC placed  TTE also noted with sif decrease in EF 25 % and severe TR. Started on IV bumex       Course further complicated with new BEULAH. ? AIN in the setting of Cefazolin Vs  overdiuresis and volume depletion. antibiotic  was  switched  to iv vancomycin  per  ID. Renal sonogram demonstrating a horseshoe kidney without evidence of hydronephrosis or renal calculi. Knapp catheter in place, with small amount of bloody urine. Pt euvolemic on examination. Diuretics held and started  hydration. Serologic GN workup neg. Despite measures needed to initiate HD on 4/10. Underwent kidney biopsy on 4/14. path consistent with  broad differential 1) post infectious glomerulitis; 2) monoclonal immunoglobulin deposition disease with lambda restriction; and 3) an autoimmune process .   primary interstitial disease c/w PYELONEPHRITIS will need for prolonged antibiotic rx. Hem consulted for  immunoglobulin deposition/IgA lamda  type.  Per heme, unlikely to need  bone marrow  bx . Pt now HD dependant.  Hypotension on HD- started midodrine. s/p permacath placement . End day of vancomycin is 5/12/23.   Pt is currently HD dependent but likely to recover in the next few weeks as per renal. Will not need fistula at this time   Medically cleared for discharge to [facility]. Will need Hematology, Renal, cardiology and PCP follow up                                               77y m pmh Afib previously on Eliquis, BPH, Chronic venous insufficiency, HLD Hypothyroidism, S/P CABG x 2, S/P mitral valve replacement, SP Status post angioplasty, Sp TKR rt comes to ed c/o shortness of breath for past month w progressive worsening of anasarca w swelling of scrotum. Admitted with acute on chronic CHF  sec to RV dysfunction; started on lasix drip.  Hosp course complicated with  MSSA bacteremia likely from left 1st recent toenail clipping and bleeding and swelling ID appreciated. Started on Ancef 2 gm iv q8TEE . ERENDIRA negative for vegetations. s/p PPM extraction and micra placement on 3/30. Per ID will need 6 weeks iv abx - PICC placed  TTE also noted with sif decrease in EF 25 % and severe TR. Started on IV bumex       Course further complicated with new BEULAH. ? AIN in the setting of Cefazolin Vs  overdiuresis and volume depletion. antibiotic  was  switched  to iv vancomycin  per  ID. Renal sonogram demonstrating a horseshoe kidney without evidence of hydronephrosis or renal calculi. Knapp catheter in place, with small amount of bloody urine. Pt euvolemic on examination. Diuretics held and started  hydration. Serologic GN workup neg. Despite measures needed to initiate HD on 4/10. Underwent kidney biopsy on 4/14. path consistent with  broad differential 1) post infectious glomerulitis; 2) monoclonal immunoglobulin deposition disease with lambda restriction; and 3) an autoimmune process .   primary interstitial disease c/w PYELONEPHRITIS will need for prolonged antibiotic rx. Hem consulted for  immunoglobulin deposition/IgA lamda  type.  Per heme, unlikely to need  bone marrow  bx . Pt now HD dependant.  Hypotension on HD- started midodrine. s/p permacath placement . End day of vancomycin is 5/12/23.     Pt is currently HD dependent but likely to recover in the next few weeks as per renal. Will not need fistula at this time    Medically cleared for discharge to [facility]. Will need Hematology, Renal, cardiology and PCP follow up

## 2023-04-03 NOTE — DISCHARGE NOTE PROVIDER - CARE PROVIDERS DIRECT ADDRESSES
,DirectAddress_Unknown,DirectAddress_Unknown,[email or web address]

## 2023-04-03 NOTE — PROGRESS NOTE ADULT - ASSESSMENT
77  year old male    h/o  CAD s/p stent , asa.      A-fib/ PPM, , hypothyroid, and total left knee replacement    prior  PPM  interrogation  with  [facility]/   s/p  brief   bursts  of  afib/ , on prior visit   Ct chest, retrosternal hematoma.  mod left pl effusion    was  on bumex/  aldactone        *   admitted with    wprsening   pedal  edema        component  of  cellulitis   and  from  c/c  diastolic  chf,/  h/o  l/edema  of L  leg        pt  admits to  being  non complaint with  meds        *   h/o    c/c AFIB,   has  PPM         on eliquis     *    Anemia, , hb stable, had  been seen by  gi  eval dr joann sanchez in past     *    CAD,     cath, with 2 vessel disease,  s/p  CABG and  MVR  surg d r marni   *      h/o  Afib on    eliquis          c/c leg redness/  4 +  edema, on keflex, has  improved    *     echo,  on 7/2022,  ef  35/ new/ severe  TR          cath,   was non  obstructive    *    on synthroid          on asa/  torpol/  eliquis          crt  noted,   from  lasix// aldactone.  leg  swelling  is  lessening   *   MSSA  bacterinia         on iv ancef.  ,  rpt bcx    are  negative        explant pf  ppm  and  Micra  placement on  3/30/23. dr sunitha rocha       Echo,  ef  25,  mod  AI.  severe  TR     PICC  line  and  extended  course  of  ab     on   bumex./ aldactone   has lessened    awiating  d/ c to rehab per pt/ wnats Vee       f/p wi  dr mikayla dick         rad< from: TTE with Doppler (w/Cont) (07.05.22 @ 09:42) >  Conclusions:  Endocardial visualization enhanced with intravenous  injection of Ultrasonic Enhancing Agent (Definity).  Severe left ventricular enlargement.  Estimated ejection fraction 35%. Diffuse hypokinesis, with  inferior akinesis.  Bioprosthetic mitral valve with normal function.  Right ventricular enlargement with decreased right  ventricular systolic function.  A device wire is noted in the right heart.  ------------------------------------------------------------------------  Confirmed on  7/5/2022 - 12:16:10 by Kristian    < end of copied text >

## 2023-04-03 NOTE — PROGRESS NOTE ADULT - SUBJECTIVE AND OBJECTIVE BOX
CARDIOLOGY     PROGRESS  NOTE   ________________________________________________    CHIEF COMPLAINT:Patient is a 77y old  Male who presents with a chief complaint of leg  swelling (25 Mar 2023 17:39)  no complain, c/o suprapubic pain  	  REVIEW OF SYSTEMS:  CONSTITUTIONAL: No fever, weight loss, or fatigue  EYES: No eye pain, visual disturbances, or discharge  ENT:  No difficulty hearing, tinnitus, vertigo; No sinus or throat pain  NECK: No pain or stiffness  RESPIRATORY: No cough, wheezing, chills or hemoptysis; + Shortness of Breath  CARDIOVASCULAR: No chest pain, palpitations, passing out, dizziness, or leg swelling  GASTROINTESTINAL: No abdominal or epigastric pain. No nausea, vomiting, or hematemesis; No diarrhea or constipation. No melena or hematochezia.  GENITOURINARY: No dysuria, frequency, hematuria, or incontinence  NEUROLOGICAL: No headaches, memory loss, loss of strength, numbness, or tremors  SKIN: No itching, burning, rashes, or lesions   LYMPH Nodes: No enlarged glands  ENDOCRINE: No heat or cold intolerance; No hair loss  MUSCULOSKELETAL: No joint pain or swelling; No muscle, back, or extremity pain  PSYCHIATRIC: No depression, anxiety, mood swings, or difficulty sleeping  HEME/LYMPH: No easy bruising, or bleeding gums  ALLERGY AND IMMUNOLOGIC: No hives or eczema	    [x ] All others negative	  [ ] Unable to obtain    PHYSICAL EXAM:  T(C): 36.7 (04-03-23 @ 04:22), Max: 37.4 (04-02-23 @ 12:20)  HR: 73 (04-03-23 @ 04:22) (73 - 92)  BP: 127/73 (04-03-23 @ 04:22) (101/64 - 138/80)  RR: 18 (04-03-23 @ 04:22) (18 - 18)  SpO2: 94% (04-03-23 @ 04:22) (94% - 94%)  Wt(kg): --  I&O's Summary    02 Apr 2023 07:01  -  03 Apr 2023 07:00  --------------------------------------------------------  IN: 240 mL / OUT: 800 mL / NET: -560 mL        Appearance: Normal	  HEENT:   Normal oral mucosa, PERRL, EOMI	  Lymphatic: No lymphadenopathy  Cardiovascular: Normal S1 S2, No JVD, + murmurs, No edema  Respiratory: Lungs clear to auscultation	  Psychiatry: A & O x 3, Mood & affect appropriate  Gastrointestinal:  Soft, Non-tender, + BS	  Skin: No rashes, No ecchymoses, No cyanosis	  Neurologic: Non-focal  Extremities: Normal range of motion, No clubbing, cyanosis or edema  Vascular: Peripheral pulses palpable 2+ bilaterally    MEDICATIONS  (STANDING):  apixaban 5 milliGRAM(s) Oral two times a day  aspirin  chewable 81 milliGRAM(s) Oral daily  atorvastatin 40 milliGRAM(s) Oral at bedtime  buMETAnide Injectable 1 milliGRAM(s) IV Push daily  ceFAZolin   IVPB 2000 milliGRAM(s) IV Intermittent every 8 hours  levothyroxine 137 MICROGram(s) Oral daily  metoprolol succinate  milliGRAM(s) Oral daily  polyethylene glycol 3350 17 Gram(s) Oral daily  senna 2 Tablet(s) Oral at bedtime  spironolactone 25 milliGRAM(s) Oral two times a day      TELEMETRY: 	    ECG:  	  RADIOLOGY:  OTHER: 	  	  LABS:	 	    CARDIAC MARKERS:                                10.4   7.80  )-----------( 245      ( 01 Apr 2023 08:33 )             33.0     04-02    137  |  93<L>  |  55<H>  ----------------------------<  191<H>  4.4   |  33<H>  |  1.50<H>    Ca    9.8      02 Apr 2023 11:48  Mg     2.0     04-02      proBNP:   Lipid Profile:   HgA1c:   TSH:         Assessment and plan  ---------------------------    77y m pmh BPH, Chronic venous insufficiency, HLD Hypothyroidism, S/P CABG x 2, S/P mitral valve replacement, SP Status post angioplasty, Sp TKR rt, Anasarca Pt comes to ed c/o shortness of breath for past month w progressive worsening of anasarca w swelling of scrotum and diff urinating, Has been noncompliant on diuretics for past two weeks w 20lb weight gain over past month. No fever and chills, sputum, nvdc, cp. PE Adult male lying stretcher w maked swelling to josh lower extr, w pitting edema,  chest  scant josh crackles w slight wheeze and prolonged exp phase  chf acute on chronic sec to RV dysfunction  start on lasix drip, strict i/o  fu lytes  tele, AFib check HR  a,fib , continue AC  will adjust cardiac meds  add aldactone 25 mg bid  +BC i bottle, MSSA   abx as per ID  s/p PPM and LEAD extraction  started  on AC sec to a.fib   echo noted with decrease in EF will adjust cardiac meds  start on iv bumex, awaiting lytes  repeat cbc post procedure  will add losartan or Entresto as tolerated  oob too chair  PICC line needs 6 weeks of abx  fu lytes/ bicarb in am, ?decrease aldactone to daily, change bumex to po  check bladder scan

## 2023-04-03 NOTE — DISCHARGE NOTE PROVIDER - NSDCCPCAREPLAN_GEN_ALL_CORE_FT
PRINCIPAL DISCHARGE DIAGNOSIS  Diagnosis: Acute on chronic systolic congestive heart failure  Assessment and Plan of Treatment: Weigh yourself daily.  If you gain 3lbs in 3 days, or 5lbs in a week call your Health Care Provider.  Do not eat or drink foods containing more than 2000mg of salt (sodium) in your diet every day.  Call your Health Care Provider if you have any swelling or increased swelling in your feet, ankles, and/or stomach.  Take all of your medication as directed.  If you become dizzy call your Health Care Provider.        SECONDARY DISCHARGE DIAGNOSES  Diagnosis: BEULAH (acute kidney injury)  Assessment and Plan of Treatment: you deveoped acute kideny injury   You had Kidney biopsy which showed likly infectious versus autoimmune versus hematological cause  You are now dialysis dependant  Follow up with Nephrology    Diagnosis: MSSA bacteremia  Assessment and Plan of Treatment: YOU developed bacteremai likly from toe infection  Complete antibitocs as indicated    Diagnosis: S/P placement of leadless cardiac pacemaker  Assessment and Plan of Treatment: Follow up with EPS   You had MIcra Pace maker placed  Follow instructions provided by EPS team

## 2023-04-03 NOTE — DISCHARGE NOTE PROVIDER - CARE PROVIDER_API CALL
Roni Lainez  CARDIOVASCULAR DISEASE  [address] [place] [id number]  Phone: [phone]  Fax: [phone]  Follow Up Time: 2 weeks    Chrystal Tavares  HEMATOLOGY  [address] [facility], [address]  [place] [address]  Phone: [phone]  Fax: [phone]  Follow Up Time: 2 weeks    Kevin Olmos; MBBS)  Infectious Disease; Internal Medicine  [address] [place] [address]  Phone: [phone]  Fax: [phone]  Follow Up Time: 2 weeks    Bg Valerio)  Clinical Informatics; Critical Care Medicine; Internal Medicine; Nephrology  [address] [place] [address]  Phone: [phone]  Fax: [phone]  Follow Up Time: 2 weeks

## 2023-04-03 NOTE — DISCHARGE NOTE PROVIDER - NSDCFUADDAPPT_GEN_ALL_CORE_FT
APPTS ARE READY TO BE MADE: [x] YES    Best Family or Patient Contact (if needed):    Additional Information about above appointments (if needed):    1:   2:   3:     Other comments or requests:    APPTS ARE READY TO BE MADE: [x] YES    Best Family or Patient Contact (if needed):    Additional Information about above appointments (if needed):    1:   2:   3:     Other comments or requests:     Patient is being discharged to rehab. Caregiver will arrange follow up.

## 2023-04-03 NOTE — PROGRESS NOTE ADULT - PROBLEM SELECTOR PLAN 1
-only 1 out of 4 positive - still suspect this is real   -cont ancef 2 gm iv q8  -repeat bcx negative  -likely source is toe from recent nail clipping   -ERENDIRA negative for vegetations  -s/p PPM extraction and micra placement   -EP input noted  -also with low grade fevers  -picc  -6 weeks iv abx - day 4 of 42 of abx  -potential side effects of abx explained including GI, Cdiff, allergy issues, development of resistance, etc.  -potential side effects of PICC explained including bleeding and infection  -weekly cbc, bun/creatinine - fax [phone]  -f/u OR cx

## 2023-04-03 NOTE — DISCHARGE NOTE PROVIDER - PROVIDER TOKENS
PROVIDER:[TOKEN:[[id number]],FOLLOWUP:[2 weeks]],PROVIDER:[TOKEN:[[id number]],FOLLOWUP:[2 weeks]],PROVIDER:[TOKEN:[[id number]],FOLLOWUP:[2 weeks]],PROVIDER:[TOKEN:[[id number]],FOLLOWUP:[2 weeks]]

## 2023-04-03 NOTE — PROGRESS NOTE ADULT - ASSESSMENT
76 y/o male PMHx HTN, HLD, CAD s/p stent on ASA, A-fib, hypothyroid, CHF now presenting to the ED worsening SOB, DUNCAN, peripheral edema, scrotal edema and 20 pound unintentional weight gain over last month with MSSA bacteremia, HF    Kevin Olmos  Attending Physician   Division of Infectious Disease  Office #[phone]  Available on [employer] Teams also  After 5pm/weekend or no response, call #[phone]

## 2023-04-04 LAB
ANION GAP SERPL CALC-SCNC: 14 MMOL/L — SIGNIFICANT CHANGE UP (ref 5–17)
ANION GAP SERPL CALC-SCNC: 14 MMOL/L — SIGNIFICANT CHANGE UP (ref 5–17)
APPEARANCE UR: ABNORMAL
BILIRUB UR-MCNC: NEGATIVE — SIGNIFICANT CHANGE UP
BLD GP AB SCN SERPL QL: NEGATIVE — SIGNIFICANT CHANGE UP
BUN SERPL-MCNC: 78 MG/DL — HIGH (ref 7–23)
BUN SERPL-MCNC: 84 MG/DL — HIGH (ref 7–23)
CALCIUM SERPL-MCNC: 9.2 MG/DL — SIGNIFICANT CHANGE UP (ref 8.4–10.5)
CALCIUM SERPL-MCNC: 9.5 MG/DL — SIGNIFICANT CHANGE UP (ref 8.4–10.5)
CHLORIDE SERPL-SCNC: 87 MMOL/L — LOW (ref 96–108)
CHLORIDE SERPL-SCNC: 90 MMOL/L — LOW (ref 96–108)
CO2 SERPL-SCNC: 29 MMOL/L — SIGNIFICANT CHANGE UP (ref 22–31)
CO2 SERPL-SCNC: 31 MMOL/L — SIGNIFICANT CHANGE UP (ref 22–31)
COLOR SPEC: ABNORMAL
CREAT SERPL-MCNC: 3.37 MG/DL — HIGH (ref 0.5–1.3)
CREAT SERPL-MCNC: 4.26 MG/DL — HIGH (ref 0.5–1.3)
DIFF PNL FLD: ABNORMAL
EGFR: 14 ML/MIN/1.73M2 — LOW
EGFR: 18 ML/MIN/1.73M2 — LOW
GLUCOSE SERPL-MCNC: 130 MG/DL — HIGH (ref 70–99)
GLUCOSE SERPL-MCNC: 131 MG/DL — HIGH (ref 70–99)
GLUCOSE UR QL: ABNORMAL
HCT VFR BLD CALC: 31.5 % — LOW (ref 39–50)
HGB BLD-MCNC: 10.5 G/DL — LOW (ref 13–17)
KETONES UR-MCNC: SIGNIFICANT CHANGE UP
LEUKOCYTE ESTERASE UR-ACNC: NEGATIVE — SIGNIFICANT CHANGE UP
MCHC RBC-ENTMCNC: 29.7 PG — SIGNIFICANT CHANGE UP (ref 27–34)
MCHC RBC-ENTMCNC: 33.3 GM/DL — SIGNIFICANT CHANGE UP (ref 32–36)
MCV RBC AUTO: 89 FL — SIGNIFICANT CHANGE UP (ref 80–100)
NITRITE UR-MCNC: NEGATIVE — SIGNIFICANT CHANGE UP
NRBC # BLD: 0 /100 WBCS — SIGNIFICANT CHANGE UP (ref 0–0)
PH UR: 8 — SIGNIFICANT CHANGE UP (ref 5–8)
PLATELET # BLD AUTO: 290 K/UL — SIGNIFICANT CHANGE UP (ref 150–400)
POTASSIUM SERPL-MCNC: 5 MMOL/L — SIGNIFICANT CHANGE UP (ref 3.5–5.3)
POTASSIUM SERPL-MCNC: 5.4 MMOL/L — HIGH (ref 3.5–5.3)
POTASSIUM SERPL-SCNC: 5 MMOL/L — SIGNIFICANT CHANGE UP (ref 3.5–5.3)
POTASSIUM SERPL-SCNC: 5.4 MMOL/L — HIGH (ref 3.5–5.3)
PROT UR-MCNC: ABNORMAL
RBC # BLD: 3.54 M/UL — LOW (ref 4.2–5.8)
RBC # FLD: 14.1 % — SIGNIFICANT CHANGE UP (ref 10.3–14.5)
RH IG SCN BLD-IMP: NEGATIVE — SIGNIFICANT CHANGE UP
SODIUM SERPL-SCNC: 130 MMOL/L — LOW (ref 135–145)
SODIUM SERPL-SCNC: 135 MMOL/L — SIGNIFICANT CHANGE UP (ref 135–145)
SP GR SPEC: 1.03 — HIGH (ref 1.01–1.02)
UROBILINOGEN FLD QL: NEGATIVE — SIGNIFICANT CHANGE UP
WBC # BLD: 12.86 K/UL — HIGH (ref 3.8–10.5)
WBC # FLD AUTO: 12.86 K/UL — HIGH (ref 3.8–10.5)

## 2023-04-04 PROCEDURE — 74176 CT ABD & PELVIS W/O CONTRAST: CPT | Mod: 26

## 2023-04-04 PROCEDURE — 76770 US EXAM ABDO BACK WALL COMP: CPT | Mod: 26

## 2023-04-04 PROCEDURE — 99232 SBSQ HOSP IP/OBS MODERATE 35: CPT

## 2023-04-04 PROCEDURE — 93308 TTE F-UP OR LMTD: CPT | Mod: 26

## 2023-04-04 RX ORDER — CEFAZOLIN SODIUM 1 G
1000 VIAL (EA) INJECTION EVERY 24 HOURS
Refills: 0 | Status: DISCONTINUED | OUTPATIENT
Start: 2023-04-05 | End: 2023-04-05

## 2023-04-04 RX ORDER — SODIUM CHLORIDE 9 MG/ML
1000 INJECTION INTRAMUSCULAR; INTRAVENOUS; SUBCUTANEOUS
Refills: 0 | Status: DISCONTINUED | OUTPATIENT
Start: 2023-04-04 | End: 2023-04-06

## 2023-04-04 RX ORDER — CEFAZOLIN SODIUM 1 G
1000 VIAL (EA) INJECTION EVERY 24 HOURS
Refills: 0 | Status: DISCONTINUED | OUTPATIENT
Start: 2023-04-04 | End: 2023-04-04

## 2023-04-04 RX ORDER — APIXABAN 2.5 MG/1
2.5 TABLET, FILM COATED ORAL EVERY 12 HOURS
Refills: 0 | Status: DISCONTINUED | OUTPATIENT
Start: 2023-04-04 | End: 2023-04-04

## 2023-04-04 RX ORDER — CHLORHEXIDINE GLUCONATE 213 G/1000ML
1 SOLUTION TOPICAL
Refills: 0 | Status: DISCONTINUED | OUTPATIENT
Start: 2023-04-04 | End: 2023-05-01

## 2023-04-04 RX ORDER — SODIUM ZIRCONIUM CYCLOSILICATE 10 G/10G
10 POWDER, FOR SUSPENSION ORAL EVERY 12 HOURS
Refills: 0 | Status: COMPLETED | OUTPATIENT
Start: 2023-04-04 | End: 2023-04-05

## 2023-04-04 RX ADMIN — SENNA PLUS 2 TABLET(S): 8.6 TABLET ORAL at 21:16

## 2023-04-04 RX ADMIN — OXYCODONE HYDROCHLORIDE 5 MILLIGRAM(S): 5 TABLET ORAL at 23:10

## 2023-04-04 RX ADMIN — SPIRONOLACTONE 25 MILLIGRAM(S): 25 TABLET, FILM COATED ORAL at 05:48

## 2023-04-04 RX ADMIN — APIXABAN 2.5 MILLIGRAM(S): 2.5 TABLET, FILM COATED ORAL at 17:32

## 2023-04-04 RX ADMIN — Medication 100 MILLIGRAM(S): at 05:50

## 2023-04-04 RX ADMIN — BUMETANIDE 1 MILLIGRAM(S): 0.25 INJECTION INTRAMUSCULAR; INTRAVENOUS at 05:48

## 2023-04-04 RX ADMIN — Medication 100 MILLIGRAM(S): at 05:48

## 2023-04-04 RX ADMIN — CHLORHEXIDINE GLUCONATE 1 APPLICATION(S): 213 SOLUTION TOPICAL at 11:40

## 2023-04-04 RX ADMIN — Medication 81 MILLIGRAM(S): at 17:30

## 2023-04-04 RX ADMIN — SODIUM ZIRCONIUM CYCLOSILICATE 10 GRAM(S): 10 POWDER, FOR SUSPENSION ORAL at 23:06

## 2023-04-04 RX ADMIN — Medication 137 MICROGRAM(S): at 05:48

## 2023-04-04 RX ADMIN — ATORVASTATIN CALCIUM 40 MILLIGRAM(S): 80 TABLET, FILM COATED ORAL at 21:16

## 2023-04-04 RX ADMIN — SODIUM CHLORIDE 70 MILLILITER(S): 9 INJECTION INTRAMUSCULAR; INTRAVENOUS; SUBCUTANEOUS at 09:00

## 2023-04-04 RX ADMIN — APIXABAN 5 MILLIGRAM(S): 2.5 TABLET, FILM COATED ORAL at 05:48

## 2023-04-04 NOTE — PROGRESS NOTE ADULT - ASSESSMENT
76 y/o male PMHx HTN, HLD, CAD s/p stent on ASA, A-fib, hypothyroid, CHF now presenting to the ED worsening SOB, DUNCAN, peripheral edema, scrotal edema and 20 pound unintentional weight gain over last month with MSSA bacteremia, HF    Kevin Olmos  Attending Physician   Division of Infectious Disease  Office #[phone]  Available on [employer] Teams also  After 5pm/weekend or no response, call #[phone]

## 2023-04-04 NOTE — CHART NOTE - NSCHARTNOTEFT_GEN_A_CORE
Pt with Hematuria , have h/o Kidney stones , d/w DR Christensen will add cbc  and  BMP , Renal US  Urology consult requested  to DR Way's office, since house team is not covering this Pt   Richard Childress NP-C [id number]

## 2023-04-04 NOTE — PROGRESS NOTE ADULT - SUBJECTIVE AND OBJECTIVE BOX
CARDIOLOGY     PROGRESS  NOTE   ________________________________________________    CHIEF COMPLAINT:Patient is a 77y old  Male who presents with a chief complaint of leg  swelling (25 Mar 2023 17:39)  doing better    	  REVIEW OF SYSTEMS:  CONSTITUTIONAL: No fever, weight loss, or fatigue  EYES: No eye pain, visual disturbances, or discharge  ENT:  No difficulty hearing, tinnitus, vertigo; No sinus or throat pain  NECK: No pain or stiffness  RESPIRATORY: No cough, wheezing, chills or hemoptysis; No Shortness of Breath  CARDIOVASCULAR: No chest pain, palpitations, passing out, dizziness, or leg swelling  GASTROINTESTINAL: No abdominal or epigastric pain. No nausea, vomiting, or hematemesis; No diarrhea or constipation. No melena or hematochezia.  GENITOURINARY: No dysuria, frequency, hematuria, or incontinence  NEUROLOGICAL: No headaches, memory loss, loss of strength, numbness, or tremors  SKIN: No itching, burning, rashes, or lesions   LYMPH Nodes: No enlarged glands  ENDOCRINE: No heat or cold intolerance; No hair loss  MUSCULOSKELETAL: No joint pain or swelling; No muscle, back, or extremity pain  PSYCHIATRIC: No depression, anxiety, mood swings, or difficulty sleeping  HEME/LYMPH: No easy bruising, or bleeding gums  ALLERGY AND IMMUNOLOGIC: No hives or eczema	    [x ] All others negative	  [ ] Unable to obtain    PHYSICAL EXAM:  T(C): 36.5 (04-04-23 @ 05:23), Max: 37.8 (04-03-23 @ 20:09)  HR: 86 (04-04-23 @ 05:23) (66 - 98)  BP: 113/73 (04-04-23 @ 05:23) (111/71 - 120/66)  RR: 18 (04-04-23 @ 05:23) (18 - 18)  SpO2: 93% (04-04-23 @ 05:23) (93% - 95%)  Wt(kg): --  I&O's Summary    03 Apr 2023 07:01  -  04 Apr 2023 07:00  --------------------------------------------------------  IN: 118 mL / OUT: 175 mL / NET: -57 mL        Appearance: Normal	  HEENT:   Normal oral mucosa, PERRL, EOMI	  Lymphatic: No lymphadenopathy  Cardiovascular: Normal S1 S2, No JVD, + murmurs, No edema  Respiratory: rhonchi  Gastrointestinal:  Soft, Non-tender, + BS	  Skin: No rashes, No ecchymoses, No cyanosis	  Neurologic: Non-focal  Extremities: Normal range of motion, no edema  Vascular: Peripheral pulses palpable 2+ bilaterally    MEDICATIONS  (STANDING):  apixaban 5 milliGRAM(s) Oral two times a day  aspirin  chewable 81 milliGRAM(s) Oral daily  atorvastatin 40 milliGRAM(s) Oral at bedtime  buMETAnide 1 milliGRAM(s) Oral daily  ceFAZolin   IVPB 2000 milliGRAM(s) IV Intermittent every 8 hours  levothyroxine 137 MICROGram(s) Oral daily  metoprolol succinate  milliGRAM(s) Oral daily  polyethylene glycol 3350 17 Gram(s) Oral daily  senna 2 Tablet(s) Oral at bedtime  spironolactone 25 milliGRAM(s) Oral daily      TELEMETRY: 	    ECG:  	  RADIOLOGY:  OTHER: 	  	  LABS:	 	    CARDIAC MARKERS:            04-04    135  |  90<L>  |  78<H>  ----------------------------<  130<H>  5.0   |  31  |  3.37<H>    Ca    9.5      04 Apr 2023 06:48  Mg     2.0     04-02      proBNP:   Lipid Profile:   HgA1c:   TSH:         Assessment and plan  ---------------------------  77y m pmh BPH, Chronic venous insufficiency, HLD Hypothyroidism, S/P CABG x 2, S/P mitral valve replacement, SP Status post angioplasty, Sp TKR rt, Anasarca Pt comes to ed c/o shortness of breath for past month w progressive worsening of anasarca w swelling of scrotum and diff urinating, Has been noncompliant on diuretics for past two weeks w 20lb weight gain over past month. No fever and chills, sputum, nvdc, cp. PE Adult male lying stretcher w maked swelling to josh lower extr, w pitting edema,  chest  scant josh crackles w slight wheeze and prolonged exp phase  chf acute on chronic sec to RV dysfunction  tele, AFib check HR  A.fib  will adjust cardiac meds  +BC i bottle, MSSA   abx as per ID  s/p PPM and LEAD extraction  started  on AC sec to a.fib   echo noted with decrease in EF will adjust cardiac meds  will add losartan or Entresto as tolerated  oob too chair  PICC line needs 6 weeks of abx  bladder scan was negative  acute on chronic renal failure, check echo r/o pericardial effusion  dc aldactone and Bumex  gentle hydration, repeat lytes in the afternoon, plan discussed with ACP

## 2023-04-04 NOTE — PROGRESS NOTE ADULT - ASSESSMENT
77  year old male    h/o  CAD s/p stent , asa.      A-fib/ PPM, , hypothyroid, and total left knee replacement    prior  PPM  interrogation  with  [facility]/   s/p  brief   bursts  of  afib/ , on prior visit   Ct chest, retrosternal hematoma.  mod left pl effusion    was  on bumex/  aldactone        *   admitted with    wprsening   pedal  edema        component  of  cellulitis   and  from  c/c  diastolic  chf,/  h/o  l/edema  of L  leg        pt  admits to  being  non complaint with  meds        *   h/o    c/c AFIB,   has  PPM         on eliquis     *    Anemia, , hb stable, had  been seen by  gi  eval dr joann sanchez in past     *    CAD,     cath, with 2 vessel disease,  s/p  CABG and  MVR  surg d r marni   *      h/o  Afib on    eliquis          c/c leg redness/  4 +  edema, on keflex, has  improved    *     echo,  on 7/2022,  ef  35/ new/ severe  TR          cath,   was non  obstructive    *    on synthroid          on asa/  torpol/  eliquis          crt  noted,   from  lasix// aldactone.  leg  swelling  is  lessening   *   MSSA  bacterinia         on iv ancef.  ,  rpt bcx    are  negative        explant pf  ppm  and  Micra  placement on  3/30/23. dr sunitha rocha       Echo,  ef  25,  mod  AI.  severe  TR       PICC  line  and  extended  course  of  ab     on   bumex./ aldactone    stopped/  BEULAH, crt           card /  dr mikayla dick         rad< from: TTE with Doppler (w/Cont) (07.05.22 @ 09:42) >  Conclusions:  Endocardial visualization enhanced with intravenous  injection of Ultrasonic Enhancing Agent (Definity).  Severe left ventricular enlargement.  Estimated ejection fraction 35%. Diffuse hypokinesis, with  inferior akinesis.  Bioprosthetic mitral valve with normal function.  Right ventricular enlargement with decreased right  ventricular systolic function.  A device wire is noted in the right heart.  ------------------------------------------------------------------------  Confirmed on  7/5/2022 - 12:16:10 by Kristian    < end of copied text >

## 2023-04-04 NOTE — PROGRESS NOTE ADULT - SUBJECTIVE AND OBJECTIVE BOX
afberile    REVIEW OF SYSTEMS:  CONSTITUTIONAL: No fever,  no  weight loss  ENT:  No  tinnitus,   no   vertigo  NECK: No pain or stiffness  RESPIRATORY: No cough, wheezing, chills or hemoptysis;    No Shortness of Breath  CARDIOVASCULAR: No chest pain, palpitations, dizziness  GASTROINTESTINAL: No abdominal or epigastric pain. No nausea, vomiting, or hematemesis; No diarrhea  No melena or hematochezia.  GENITOURINARY: No dysuria, frequency, hematuria, or incontinence  NEUROLOGICAL: No headaches  SKIN: No itching,  no   rash  LYMPH Nodes: No enlarged glands  ENDOCRINE: No heat or cold intolerance  MUSCULOSKELETAL: No joint pain or swelling  PSYCHIATRIC: No depression, anxiety  HEME/LYMPH: No easy bruising, or bleeding gums  ALLERGY AND IMMUNOLOGIC: No hives or eczema	    MEDICATIONS  (STANDING):  apixaban 5 milliGRAM(s) Oral two times a day  aspirin  chewable 81 milliGRAM(s) Oral daily  atorvastatin 40 milliGRAM(s) Oral at bedtime  ceFAZolin   IVPB 2000 milliGRAM(s) IV Intermittent every 8 hours  levothyroxine 137 MICROGram(s) Oral daily  metoprolol succinate  milliGRAM(s) Oral daily  polyethylene glycol 3350 17 Gram(s) Oral daily  senna 2 Tablet(s) Oral at bedtime  sodium chloride 0.9%. 1000 milliLiter(s) (70 mL/Hr) IV Continuous <Continuous>    MEDICATIONS  (PRN):  acetaminophen     Tablet .. 650 milliGRAM(s) Oral every 6 hours PRN Mild Pain (1 - 3)  bisacodyl Suppository 10 milliGRAM(s) Rectal daily PRN Constipation  oxyCODONE    IR 5 milliGRAM(s) Oral every 6 hours PRN Moderate Pain (4 - 6)      Vital Signs Last 24 Hrs  T(C): 36.5 (2023 05:23), Max: 37.8 (2023 20:09)  T(F): 97.7 (2023 05:23), Max: 100.1 (2023 20:09)  HR: 58 (2023 07:30) (58 - 98)  BP: 156/68 (2023 07:30) (111/71 - 156/68)  BP(mean): --  RR: 18 (2023 05:23) (18 - 18)  SpO2: 93% (2023 05:23) (93% - 95%)    Parameters below as of 2023 05:23  Patient On (Oxygen Delivery Method): room air      CAPILLARY BLOOD GLUCOSE        I&O's Summary    2023 07:01  -  2023 07:00  --------------------------------------------------------  IN: 118 mL / OUT: 175 mL / NET: -57 mL          Appearance: Normal	  HEENT:   Normal oral mucosa, PERRL, EOMI	  Lymphatic: No lymphadenopathy  Cardiovascular: Normal S1 S2, No JVD  Respiratory: Lungs clear to auscultation	  Psychiatry: A & O x 3, Mood & affect appropriate  Gastrointestinal:  Soft, Non-tender, + BS	  Skin: No rash, No ecchymoses	  Extremities: Normal range of motion  Vascular: Peripheral pulses palpable bilaterally    LABS:        135  |  90<L>  |  78<H>  ----------------------------<  130<H>  5.0   |  31  |  3.37<H>    Ca    9.5      2023 06:48  Mg     2.0                 Urinalysis Basic - ( 2023 07:49 )    Color: BROWN / Appearance: Turbid / S.024 / pH: x  Gluc: x / Ketone: Negative  / Bili: Negative / Urobili: Negative   Blood: x / Protein: >600 / Nitrite: Negative   Leuk Esterase: Negative / RBC: >50 /hpf / WBC 5 /HPF   Sq Epi: x / Non Sq Epi: 0 /hpf / Bacteria: Negative                      Consultant(s) Notes Reviewed:      Care Discussed with Consultants/Other Providers:

## 2023-04-04 NOTE — PROGRESS NOTE ADULT - PROBLEM SELECTOR PLAN 1
-only 1 out of 4 positive - still suspect this is real   -lower ancef 1 gm iv q24 due to BEULAH  -repeat bcx negative  -likely source is toe from recent nail clipping   -ERENDIRA negative for vegetations  -s/p PPM extraction and micra placement   -EP input noted  -also with low grade fevers  -picc  -6 weeks iv abx - day 5 of 42 of abx  -potential side effects of abx explained including GI, Cdiff, allergy issues, development of resistance, etc.  -potential side effects of PICC explained including bleeding and infection  -weekly cbc, bun/creatinine - fax [phone]  -OR cx negative

## 2023-04-04 NOTE — PROGRESS NOTE ADULT - SUBJECTIVE AND OBJECTIVE BOX
JANEE ARCEO 77y [id number]    Patient is a 77y old  Male who presents with a chief complaint of leg  swelling (25 Mar 2023 17:39)      Follow Up/CC:  ID following for bacteremia    Interval History/ROS: no fever    Allergies    alfuzosin (Hives)  levofloxacin (Hives)  Myrbetriq (Hives)  tamsulosin (Hives)    Intolerances        ANTIMICROBIALS:  ceFAZolin   IVPB 1000 every 24 hours      MEDICATIONS  (STANDING):  apixaban 5 milliGRAM(s) Oral two times a day  aspirin  chewable 81 milliGRAM(s) Oral daily  atorvastatin 40 milliGRAM(s) Oral at bedtime  ceFAZolin   IVPB 1000 milliGRAM(s) IV Intermittent every 24 hours  chlorhexidine 4% Liquid 1 Application(s) Topical <User Schedule>  levothyroxine 137 MICROGram(s) Oral daily  metoprolol succinate  milliGRAM(s) Oral daily  polyethylene glycol 3350 17 Gram(s) Oral daily  senna 2 Tablet(s) Oral at bedtime  sodium chloride 0.9%. 1000 milliLiter(s) (70 mL/Hr) IV Continuous <Continuous>    MEDICATIONS  (PRN):  acetaminophen     Tablet .. 650 milliGRAM(s) Oral every 6 hours PRN Mild Pain (1 - 3)  bisacodyl Suppository 10 milliGRAM(s) Rectal daily PRN Constipation  oxyCODONE    IR 5 milliGRAM(s) Oral every 6 hours PRN Moderate Pain (4 - 6)        Vital Signs Last 24 Hrs  T(C): 36.5 (2023 05:23), Max: 37.8 (2023 20:09)  T(F): 97.7 (2023 05:23), Max: 100.1 (2023 20:09)  HR: 58 (2023 07:30) (58 - 98)  BP: 156/68 (2023 07:30) (111/71 - 156/68)  BP(mean): --  RR: 18 (2023 05:23) (18 - 18)  SpO2: 93% (2023 05:23) (93% - 95%)    Parameters below as of 2023 05:23  Patient On (Oxygen Delivery Method): room air          -    135  |  90<L>  |  78<H>  ----------------------------<  130<H>  5.0   |  31  |  3.37<H>    Ca    9.5      2023 06:48  Mg     2.0             Urinalysis Basic - ( 2023 07:49 )    Color: BROWN / Appearance: Turbid / S.024 / pH: x  Gluc: x / Ketone: Negative  / Bili: Negative / Urobili: Negative   Blood: x / Protein: >600 / Nitrite: Negative   Leuk Esterase: Negative / RBC: >50 /hpf / WBC 5 /HPF   Sq Epi: x / Non Sq Epi: 0 /hpf / Bacteria: Negative        MICROBIOLOGY:  .Other Other  23   Culture is being performed. Fungal cultures are held for 4 weeks.  --  --      .Blood Blood-Peripheral  23   No Growth Final  --  --      .Blood  23   Growth in anaerobic bottle: Staphylococcus aureus  ***Blood Panel PCR results on this specimen are available  approximately 3 hours after the Gram stain result.***  Gram stain, PCR, and/or culture results may not always  correspond due to difference in methodologies.  ************************************************************  This PCR assay was performed by multiplex PCR. This  Assay tests for 66 bacterial and resistance gene targets.  Please refer to the [facility] test directory  at [email or web address] for details.  --  Blood Culture PCR  Staphylococcus aureus      .Blood  23   No Growth Final  --  --              v            RADIOLOGY

## 2023-04-05 DIAGNOSIS — E87.5 HYPERKALEMIA: ICD-10-CM

## 2023-04-05 DIAGNOSIS — N17.9 ACUTE KIDNEY FAILURE, UNSPECIFIED: ICD-10-CM

## 2023-04-05 LAB
ALBUMIN SERPL ELPH-MCNC: 2.4 G/DL — LOW (ref 3.3–5)
ALBUMIN SERPL ELPH-MCNC: 2.5 G/DL — LOW (ref 3.3–5)
ALP SERPL-CCNC: 165 U/L — HIGH (ref 40–120)
ALP SERPL-CCNC: 166 U/L — HIGH (ref 40–120)
ALT FLD-CCNC: <5 U/L — LOW (ref 10–45)
ALT FLD-CCNC: <5 U/L — LOW (ref 10–45)
ANION GAP SERPL CALC-SCNC: 15 MMOL/L — SIGNIFICANT CHANGE UP (ref 5–17)
AST SERPL-CCNC: 13 U/L — SIGNIFICANT CHANGE UP (ref 10–40)
AST SERPL-CCNC: 14 U/L — SIGNIFICANT CHANGE UP (ref 10–40)
BACTERIA # UR AUTO: NEGATIVE — SIGNIFICANT CHANGE UP
BASOPHILS # BLD AUTO: 0.04 K/UL — SIGNIFICANT CHANGE UP (ref 0–0.2)
BASOPHILS NFR BLD AUTO: 0.3 % — SIGNIFICANT CHANGE UP (ref 0–2)
BILIRUB SERPL-MCNC: 0.4 MG/DL — SIGNIFICANT CHANGE UP (ref 0.2–1.2)
BILIRUB SERPL-MCNC: 0.5 MG/DL — SIGNIFICANT CHANGE UP (ref 0.2–1.2)
BUN SERPL-MCNC: 90 MG/DL — HIGH (ref 7–23)
BUN SERPL-MCNC: 91 MG/DL — HIGH (ref 7–23)
BUN SERPL-MCNC: 98 MG/DL — HIGH (ref 7–23)
CALCIUM SERPL-MCNC: 8.7 MG/DL — SIGNIFICANT CHANGE UP (ref 8.4–10.5)
CALCIUM SERPL-MCNC: 8.7 MG/DL — SIGNIFICANT CHANGE UP (ref 8.4–10.5)
CALCIUM SERPL-MCNC: 9 MG/DL — SIGNIFICANT CHANGE UP (ref 8.4–10.5)
CHLORIDE SERPL-SCNC: 87 MMOL/L — LOW (ref 96–108)
CHLORIDE SERPL-SCNC: 88 MMOL/L — LOW (ref 96–108)
CHLORIDE SERPL-SCNC: 88 MMOL/L — LOW (ref 96–108)
CHLORIDE UR-SCNC: 71 MMOL/L — SIGNIFICANT CHANGE UP
CO2 SERPL-SCNC: 27 MMOL/L — SIGNIFICANT CHANGE UP (ref 22–31)
CREAT ?TM UR-MCNC: 42 MG/DL — SIGNIFICANT CHANGE UP
CREAT SERPL-MCNC: 4.69 MG/DL — HIGH (ref 0.5–1.3)
CREAT SERPL-MCNC: 5.08 MG/DL — HIGH (ref 0.5–1.3)
CREAT SERPL-MCNC: 5.49 MG/DL — HIGH (ref 0.5–1.3)
CULTURE RESULTS: SIGNIFICANT CHANGE UP
EGFR: 10 ML/MIN/1.73M2 — LOW
EGFR: 11 ML/MIN/1.73M2 — LOW
EGFR: 12 ML/MIN/1.73M2 — LOW
EOSINOPHIL # BLD AUTO: 0.1 K/UL — SIGNIFICANT CHANGE UP (ref 0–0.5)
EOSINOPHIL NFR BLD AUTO: 0.8 % — SIGNIFICANT CHANGE UP (ref 0–6)
EPI CELLS # UR: 1 — SIGNIFICANT CHANGE UP
GLUCOSE BLDC GLUCOMTR-MCNC: 140 MG/DL — HIGH (ref 70–99)
GLUCOSE SERPL-MCNC: 111 MG/DL — HIGH (ref 70–99)
GLUCOSE SERPL-MCNC: 148 MG/DL — HIGH (ref 70–99)
GLUCOSE SERPL-MCNC: 202 MG/DL — HIGH (ref 70–99)
HCT VFR BLD CALC: 31.2 % — LOW (ref 39–50)
HCT VFR BLD CALC: 32.4 % — LOW (ref 39–50)
HGB BLD-MCNC: 10.2 G/DL — LOW (ref 13–17)
HGB BLD-MCNC: 10.6 G/DL — LOW (ref 13–17)
HYALINE CASTS # UR AUTO: 0 /LPF — SIGNIFICANT CHANGE UP (ref 0–7)
IMM GRANULOCYTES NFR BLD AUTO: 0.7 % — SIGNIFICANT CHANGE UP (ref 0–0.9)
LYMPHOCYTES # BLD AUTO: 0.71 K/UL — LOW (ref 1–3.3)
LYMPHOCYTES # BLD AUTO: 5.6 % — LOW (ref 13–44)
MAGNESIUM SERPL-MCNC: 2 MG/DL — SIGNIFICANT CHANGE UP (ref 1.6–2.6)
MCHC RBC-ENTMCNC: 29 PG — SIGNIFICANT CHANGE UP (ref 27–34)
MCHC RBC-ENTMCNC: 29.7 PG — SIGNIFICANT CHANGE UP (ref 27–34)
MCHC RBC-ENTMCNC: 32.7 GM/DL — SIGNIFICANT CHANGE UP (ref 32–36)
MCHC RBC-ENTMCNC: 32.7 GM/DL — SIGNIFICANT CHANGE UP (ref 32–36)
MCV RBC AUTO: 88.8 FL — SIGNIFICANT CHANGE UP (ref 80–100)
MCV RBC AUTO: 90.7 FL — SIGNIFICANT CHANGE UP (ref 80–100)
MONOCYTES # BLD AUTO: 0.67 K/UL — SIGNIFICANT CHANGE UP (ref 0–0.9)
MONOCYTES NFR BLD AUTO: 5.3 % — SIGNIFICANT CHANGE UP (ref 2–14)
MRSA PCR RESULT.: SIGNIFICANT CHANGE UP
NEUTROPHILS # BLD AUTO: 10.97 K/UL — HIGH (ref 1.8–7.4)
NEUTROPHILS NFR BLD AUTO: 87.3 % — HIGH (ref 43–77)
NRBC # BLD: 0 /100 WBCS — SIGNIFICANT CHANGE UP (ref 0–0)
NRBC # BLD: 0 /100 WBCS — SIGNIFICANT CHANGE UP (ref 0–0)
OSMOLALITY UR: 299 MOS/KG — LOW (ref 300–900)
PLATELET # BLD AUTO: 267 K/UL — SIGNIFICANT CHANGE UP (ref 150–400)
PLATELET # BLD AUTO: 276 K/UL — SIGNIFICANT CHANGE UP (ref 150–400)
POTASSIUM SERPL-MCNC: 4.8 MMOL/L — SIGNIFICANT CHANGE UP (ref 3.5–5.3)
POTASSIUM SERPL-MCNC: 4.9 MMOL/L — SIGNIFICANT CHANGE UP (ref 3.5–5.3)
POTASSIUM SERPL-MCNC: 5.4 MMOL/L — HIGH (ref 3.5–5.3)
POTASSIUM SERPL-SCNC: 4.8 MMOL/L — SIGNIFICANT CHANGE UP (ref 3.5–5.3)
POTASSIUM SERPL-SCNC: 4.9 MMOL/L — SIGNIFICANT CHANGE UP (ref 3.5–5.3)
POTASSIUM SERPL-SCNC: 5.4 MMOL/L — HIGH (ref 3.5–5.3)
POTASSIUM UR-SCNC: 29 MMOL/L — SIGNIFICANT CHANGE UP
PROT ?TM UR-MCNC: >2000 MG/DL — HIGH (ref 0–12)
PROT SERPL-MCNC: 6.5 G/DL — SIGNIFICANT CHANGE UP (ref 6–8.3)
PROT SERPL-MCNC: 6.7 G/DL — SIGNIFICANT CHANGE UP (ref 6–8.3)
PROT/CREAT UR-RTO: SIGNIFICANT CHANGE UP (ref 0–0.2)
RBC # BLD: 3.44 M/UL — LOW (ref 4.2–5.8)
RBC # BLD: 3.65 M/UL — LOW (ref 4.2–5.8)
RBC # FLD: 14.3 % — SIGNIFICANT CHANGE UP (ref 10.3–14.5)
RBC # FLD: 14.3 % — SIGNIFICANT CHANGE UP (ref 10.3–14.5)
RBC CASTS # UR COMP ASSIST: >50 /HPF — HIGH (ref 0–4)
S AUREUS DNA NOSE QL NAA+PROBE: SIGNIFICANT CHANGE UP
SODIUM SERPL-SCNC: 129 MMOL/L — LOW (ref 135–145)
SODIUM SERPL-SCNC: 130 MMOL/L — LOW (ref 135–145)
SODIUM SERPL-SCNC: 130 MMOL/L — LOW (ref 135–145)
SODIUM UR-SCNC: 111 MMOL/L — SIGNIFICANT CHANGE UP
SPECIMEN SOURCE: SIGNIFICANT CHANGE UP
WBC # BLD: 11.8 K/UL — HIGH (ref 3.8–10.5)
WBC # BLD: 12.23 K/UL — HIGH (ref 3.8–10.5)
WBC # FLD AUTO: 11.8 K/UL — HIGH (ref 3.8–10.5)
WBC # FLD AUTO: 12.23 K/UL — HIGH (ref 3.8–10.5)
WBC UR QL: 5 /HPF — SIGNIFICANT CHANGE UP (ref 0–5)

## 2023-04-05 PROCEDURE — 99232 SBSQ HOSP IP/OBS MODERATE 35: CPT | Mod: GC

## 2023-04-05 PROCEDURE — 99232 SBSQ HOSP IP/OBS MODERATE 35: CPT

## 2023-04-05 RX ORDER — VANCOMYCIN HCL 1 G
1000 VIAL (EA) INTRAVENOUS ONCE
Refills: 0 | Status: COMPLETED | OUTPATIENT
Start: 2023-04-06 | End: 2023-04-06

## 2023-04-05 RX ORDER — SODIUM ZIRCONIUM CYCLOSILICATE 10 G/10G
10 POWDER, FOR SUSPENSION ORAL ONCE
Refills: 0 | Status: COMPLETED | OUTPATIENT
Start: 2023-04-05 | End: 2023-04-05

## 2023-04-05 RX ADMIN — OXYCODONE HYDROCHLORIDE 5 MILLIGRAM(S): 5 TABLET ORAL at 00:15

## 2023-04-05 RX ADMIN — Medication 650 MILLIGRAM(S): at 02:39

## 2023-04-05 RX ADMIN — CHLORHEXIDINE GLUCONATE 1 APPLICATION(S): 213 SOLUTION TOPICAL at 06:34

## 2023-04-05 RX ADMIN — Medication 650 MILLIGRAM(S): at 18:22

## 2023-04-05 RX ADMIN — Medication 137 MICROGRAM(S): at 06:34

## 2023-04-05 RX ADMIN — OXYCODONE HYDROCHLORIDE 5 MILLIGRAM(S): 5 TABLET ORAL at 08:51

## 2023-04-05 RX ADMIN — SODIUM ZIRCONIUM CYCLOSILICATE 10 GRAM(S): 10 POWDER, FOR SUSPENSION ORAL at 06:34

## 2023-04-05 RX ADMIN — Medication 100 MILLIGRAM(S): at 06:34

## 2023-04-05 RX ADMIN — Medication 650 MILLIGRAM(S): at 02:04

## 2023-04-05 RX ADMIN — SENNA PLUS 2 TABLET(S): 8.6 TABLET ORAL at 22:20

## 2023-04-05 RX ADMIN — Medication 81 MILLIGRAM(S): at 18:00

## 2023-04-05 RX ADMIN — SODIUM ZIRCONIUM CYCLOSILICATE 10 GRAM(S): 10 POWDER, FOR SUSPENSION ORAL at 10:09

## 2023-04-05 RX ADMIN — ATORVASTATIN CALCIUM 40 MILLIGRAM(S): 80 TABLET, FILM COATED ORAL at 22:20

## 2023-04-05 NOTE — DIETITIAN INITIAL EVALUATION ADULT - OTHER CALCULATIONS
--Defer fluid needs to medical team  -- Estimated Calorie/Protein needs are based on IBW of 166 pounds

## 2023-04-05 NOTE — DIETITIAN INITIAL EVALUATION ADULT - ENERGY INTAKE
-- Pt reports fair appetite and PO intakes during present admission. Food being provided from the outside by significant other as well. Reviewed current therapeutic dietary restrictions with pt and significant other. Receptive to education, handouts provided for home use.   -- Food preferences' updated with pt and diet office made aware. Will honor food preferences as appropriate and available.  Fair (50-75%)

## 2023-04-05 NOTE — DIETITIAN INITIAL EVALUATION ADULT - EDUCATION DIETARY MODIFICATIONS
Recommended limited salt intake. Reviewed foods high in salt and amount of salt recommended per day. Discussed nutrition label reading. Low Na diet, foods high in Na to avoid and tips for eating out. Reviewed K restriction, foods high in K to avoid and portion sizing.  Pt & significant other verbalized understanding and accepted written materials/(1) partially meets; needs review/practice/verbalization

## 2023-04-05 NOTE — CONSULT NOTE ADULT - ATTENDING COMMENTS
Agree w amended fellow's note:  DDx includes a couple of possiblities  The timing of cefazolin with rapid onset BEULAH suggestive of AIN    Would prefer vancomycin to another beta lactam

## 2023-04-05 NOTE — DIETITIAN INITIAL EVALUATION ADULT - ORAL INTAKE PTA/DIET HISTORY
Pt confirms no known food allergies/intolerances. Reports having a good appetite and PO intakes at home. Pt and signficant other report trying to limit pt's sodium/salt intake at home. Pt denies nausea, vomiting, diarrhea, or constipation. Denies difficulty chewing/swallowing. Denies taking any oral nutritional supplement at home as well.

## 2023-04-05 NOTE — PROGRESS NOTE ADULT - PROBLEM SELECTOR PLAN 1
-only 1 out of 4 positive - still suspect this is real   -cont ancef 1 gm iv q24 due to BEULAH  -repeat bcx negative  -likely source is toe from recent nail clipping   -ERENDIRA negative for vegetations  -s/p PPM extraction and micra placement   -EP input noted  -also with low grade fevers  -picc  -6 weeks iv abx - day 6 of 42 of abx  -potential side effects of abx explained including GI, Cdiff, allergy issues, development of resistance, etc.  -potential side effects of PICC explained including bleeding and infection  -weekly cbc, bun/creatinine - fax [phone]  -OR cx negative

## 2023-04-05 NOTE — CONSULT NOTE ADULT - PROBLEM SELECTOR RECOMMENDATION 9
Pt. with oliguric BEULAH suspected in setting of overdiuresis and volume depletion. On review of [facility] pt noted to have a SCr of 1 on admission which since 4/1 has progressively increased to 4.26 most recently. UA with blood (suspected from delgadillo catheter insertion) and also proteinuria. Please obtain urine lytes and spot urine TP/CR ratio. Renal sonogram demonstrating a horseshoe kidney without evidence of hydronephrosis or renal calculi. Delgadillo catheter in place, with small amount of bloody urine. Pt euvolemic on examination. Agree with holding diuretics at this time and some gentle hydration given overnight. Pending AM labs. Monitor labs and urine output. Avoid nephrotoxins. Dose medications as per eGFR. Pt. with oliguric BEULAH suspected in setting of overdiuresis and volume depletion. Differential also includes AIN perhaps in setting of Cefazolin? On review of [facility] pt noted to have a SCr of 1 on admission which since 4/1 has progressively increased to 4.26 most recently. UA with blood (suspected from delgadillo catheter insertion) and also proteinuria. Please obtain urine lytes and spot urine TP/CR ratio. Renal sonogram demonstrating a horseshoe kidney without evidence of hydronephrosis or renal calculi. Delgadillo catheter in place, with small amount of bloody urine. Pt euvolemic on examination. Agree with holding diuretics at this time and some gentle hydration given overnight. Recommend changing abx regimen if okay with ID. Monitor labs and urine output. Avoid nephrotoxins. Dose medications as per eGFR.

## 2023-04-05 NOTE — PROGRESS NOTE ADULT - ASSESSMENT
77  year old male    h/o  CAD s/p stent , asa.      A-fib/ PPM, , hypothyroid, and total left knee replacement    prior  PPM  interrogation  with  WCT/   s/p  brief   bursts  of  afib/ , on prior visit   Ct chest, retrosternal hematoma.  mod left pl effusion    was  on bumex/  aldactone        *   admitted with    wprsening   pedal  edema        component  of  cellulitis   and  from  c/c  diastolic  chf,/  h/o  l/edema  of L  leg        pt  admits to  being  non complaint with  meds        *   h/o    c/c AFIB,   has  PPM         on eliquis     *    Anemia, , hb stable, had  been seen by  gi  eval dr joann sanchez in past     *    CAD,     cath, with 2 vessel disease,  s/p  CABG and  MVR  surg d r marni   *      h/o  Afib on    eliquis          c/c leg redness/  4 +  edema, on keflex, has  improved    *     echo,  on 7/2022,  ef  35/ new/ severe  TR          cath,   was non  obstructive    *    on synthroid          on asa/  torpol/  eliquis          crt  noted,   from  lasix// aldactone.  leg  swelling  is  lessening   *   MSSA  bacterinia         on iv ancef.  ,  rpt bcx    are  negative        explant pf  ppm  and  Micra  placement on  3/30/23. dr sunitha rocha   *    Echo,  ef  25,  mod  AI.  severe  TR       PICC  line  and  extended  course  of  ab. in iv cefazolin         bumex./ aldactone stopped/        BEULAH, crt  4.6.  hyperkalemia, seen by hernán e renal/ ?  AIN.  per  lab,  unable  to order  urine  eos /  test no lnge r available           card /  dr mikayla dick         rad< from: TTE with Doppler (w/Cont) (07.05.22 @ 09:42) >  Conclusions:  Endocardial visualization enhanced with intravenous  injection of Ultrasonic Enhancing Agent (Definity).  Severe left ventricular enlargement.  Estimated ejection fraction 35%. Diffuse hypokinesis, with  inferior akinesis.  Bioprosthetic mitral valve with normal function.  Right ventricular enlargement with decreased right  ventricular systolic function.  A device wire is noted in the right heart.  ------------------------------------------------------------------------  Confirmed on  7/5/2022 - 12:16:10 by Kristian    < end of copied text >

## 2023-04-05 NOTE — CONSULT NOTE ADULT - SUBJECTIVE AND OBJECTIVE BOX
[facility] DIVISION OF KIDNEY DISEASES AND HYPERTENSION -- [phone]  -- INITIAL CONSULT NOTE  --------------------------------------------------------------------------------  HPI:  Patient is a 77 year old male with PMH of Afib, CAD s/p CABG, BPH, HLD, HTN, and hypothyroidism who presented to the hospital on 3/25 with complaints of shortness of breath and wiley gain. The patient had been nonadherent with his diuretic regimen as outpatient and presented with acute on chronic decompensated heart failure. The patient was initiated on IV diuresis with significant improvement in volume status with approximately 15lb weight loss. Hospital course was further complicated by MSSA bacteremia requiring pacemaker extraction with micra implant and currently being managed with IVC abx with PICC line in place. The nephrology team was consulted for oliguric BEULAH. On review of [facility] pt noted to have a SCr of 1 on admission which since 4/1 has progressively increased to 4.26 most recently. The patient underwent placement of a delgadillo catheter this morning without significant UOP noted. The patient was seen and examined earlier today. He denied any personal of family history of kidney disease. He admits that he has been feeling very depressed lately and feels like he needs to talk with a psychiatrist. He admits he does not have an appetite recently and has not been eating or drinking very well. He denied any chest pain, shortness of breath, nausea, or vomiting.       PAST HISTORY  --------------------------------------------------------------------------------  PAST MEDICAL & SURGICAL HISTORY:  Afib  not on anticoagulation      CAD (coronary artery disease)      Varicose vein of leg      Hypothyroid      Pacemaker      H/O fracture of hip  2017      H/O asbestosis      HTN (hypertension)      Pacemaker  [employer] - Model RVDR01 1/10/2012      Stented coronary artery  drug eluding stent 5/2007      H/O detached retina repair  1952      S/P cataract surgery      History of hip surgery  left hip ORIF      H/O varicose vein stripping  1993 left leg      History of left knee replacement  2013 - multiple infections post op requiring reoperation in 2015, 2017, 2019)      H/O foot surgery  for infection of right foot 2019      H/O inguinal hernia repair  right 1993        FAMILY HISTORY:  FH: skin cancer (Father)      PAST SOCIAL HISTORY:    ALLERGIES & MEDICATIONS  --------------------------------------------------------------------------------  Allergies    alfuzosin (Hives)  levofloxacin (Hives)  Myrbetriq (Hives)  tamsulosin (Hives)    Intolerances      Standing Inpatient Medications  aspirin  chewable 81 milliGRAM(s) Oral daily  atorvastatin 40 milliGRAM(s) Oral at bedtime  ceFAZolin   IVPB 1000 milliGRAM(s) IV Intermittent every 24 hours  chlorhexidine 4% Liquid 1 Application(s) Topical <User Schedule>  levothyroxine 137 MICROGram(s) Oral daily  metoprolol succinate  milliGRAM(s) Oral daily  polyethylene glycol 3350 17 Gram(s) Oral daily  senna 2 Tablet(s) Oral at bedtime  sodium chloride 0.9%. 1000 milliLiter(s) IV Continuous <Continuous>    PRN Inpatient Medications  acetaminophen     Tablet .. 650 milliGRAM(s) Oral every 6 hours PRN  bisacodyl Suppository 10 milliGRAM(s) Rectal daily PRN  oxyCODONE    IR 5 milliGRAM(s) Oral every 6 hours PRN      REVIEW OF SYSTEMS    All other systems were reviewed and are negative, except as noted.    VITALS/PHYSICAL EXAM  --------------------------------------------------------------------------------  T(C): 36.8 (04-05-23 @ 04:14), Max: 36.8 (04-05-23 @ 04:14)  HR: 79 (04-05-23 @ 04:14) (58 - 81)  BP: 106/64 (04-05-23 @ 04:14) (104/61 - 157/73)  RR: 18 (04-05-23 @ 04:14) (18 - 18)  SpO2: 95% (04-05-23 @ 04:14) (95% - 96%)  Wt(kg): --        04-04-23 @ 07:01  -  04-05-23 @ 07:00  --------------------------------------------------------  IN: 1180 mL / OUT: 220 mL / NET: 960 mL      Physical Exam:  	Gen: NAD  	HEENT: MMM  	Pulm: CTA B/L  	CV: S1S2  	Abd: Soft, +BS   	Ext: No LE edema B/L  	Neuro: Awake  	Skin: Warm and dry    LABS/STUDIES  --------------------------------------------------------------------------------              10.5   12.86 >-----------<  290      [04-04-23 @ 18:24]              31.5     130  |  87  |  84  ----------------------------<  131      [04-04-23 @ 18:24]  5.4   |  29  |  4.26        Ca     9.2     [04-04-23 @ 18:24]      Creatinine Trend:  SCr 4.26 [04-04 @ 18:24]  SCr 3.37 [04-04 @ 06:48]  SCr 2.25 [04-03 @ 10:28]  SCr 1.98 [04-03 @ 07:22]  SCr 1.50 [04-02 @ 11:48]    Urinalysis - [04-04-23 @ 23:13]      Color Red / Appearance Turbid / SG 1.030 / pH 8.0      Gluc 100 mg/dL / Ketone Trace  / Bili Negative / Urobili Negative       Blood Large / Protein 300 mg/dL / Leuk Est Negative / Nitrite Negative      RBC >50 / WBC 5 / Hyaline 0 / Gran  / Sq Epi  / Non Sq Epi 1 / Bacteria Negative        HCV 0.19, Nonreact      [06-30-20 @ 08:45]

## 2023-04-05 NOTE — DIETITIAN INITIAL EVALUATION ADULT - REASON INDICATOR FOR ASSESSMENT
Consult: Assessment/Education  Information obtained from: Review of pt's current medical record, interview with pt and his significant other in his assigned room on [facility], previous RD documentation from prior admission to [facility]

## 2023-04-05 NOTE — PROGRESS NOTE ADULT - SUBJECTIVE AND OBJECTIVE BOX
CARDIOLOGY     PROGRESS  NOTE   ________________________________________________    CHIEF COMPLAINT:Patient is a 77y old  Male who presents with a chief complaint of leg  swelling (25 Mar 2023 17:39)  comfortable  	  REVIEW OF SYSTEMS:  CONSTITUTIONAL: No fever, weight loss, or fatigue  EYES: No eye pain, visual disturbances, or discharge  ENT:  No difficulty hearing, tinnitus, vertigo; No sinus or throat pain  NECK: No pain or stiffness  RESPIRATORY: No cough, wheezing, chills or hemoptysis; No Shortness of Breath  CARDIOVASCULAR: No chest pain, palpitations, passing out, dizziness, or leg swelling  GASTROINTESTINAL: No abdominal or epigastric pain. No nausea, vomiting, or hematemesis; No diarrhea or constipation. No melena or hematochezia.  GENITOURINARY: No dysuria, frequency, hematuria, or incontinence  NEUROLOGICAL: No headaches, memory loss, loss of strength, numbness, or tremors  SKIN: No itching, burning, rashes, or lesions   LYMPH Nodes: No enlarged glands  ENDOCRINE: No heat or cold intolerance; No hair loss  MUSCULOSKELETAL: No joint pain or swelling; No muscle, back, or extremity pain  PSYCHIATRIC: No depression, anxiety, mood swings, or difficulty sleeping  HEME/LYMPH: No easy bruising, or bleeding gums  ALLERGY AND IMMUNOLOGIC: No hives or eczema	    [ x] All others negative	  [ ] Unable to obtain    PHYSICAL EXAM:  T(C): 36.8 (04-05-23 @ 04:14), Max: 36.8 (04-05-23 @ 04:14)  HR: 79 (04-05-23 @ 04:14) (60 - 81)  BP: 106/64 (04-05-23 @ 04:14) (104/61 - 157/73)  RR: 18 (04-05-23 @ 04:14) (18 - 18)  SpO2: 95% (04-05-23 @ 04:14) (95% - 96%)  Wt(kg): --  I&O's Summary    04 Apr 2023 07:01  -  05 Apr 2023 07:00  --------------------------------------------------------  IN: 1180 mL / OUT: 220 mL / NET: 960 mL        Appearance: Normal	  HEENT:   Normal oral mucosa, PERRL, EOMI	  Lymphatic: No lymphadenopathy  Cardiovascular: Normal S1 S2, No JVD, + murmurs, No edema  Respiratory: rhonchi  Psychiatry: A & O x 3, Mood & affect appropriate  Gastrointestinal:  Soft, Non-tender, + BS	  Skin: No rashes, No ecchymoses, No cyanosis	  Neurologic: Non-focal  Extremities: Normal range of motion, No clubbing, cyanosis or edema  Vascular: Peripheral pulses palpable 2+ bilaterally    MEDICATIONS  (STANDING):  aspirin  chewable 81 milliGRAM(s) Oral daily  atorvastatin 40 milliGRAM(s) Oral at bedtime  ceFAZolin   IVPB 1000 milliGRAM(s) IV Intermittent every 24 hours  chlorhexidine 4% Liquid 1 Application(s) Topical <User Schedule>  levothyroxine 137 MICROGram(s) Oral daily  metoprolol succinate  milliGRAM(s) Oral daily  polyethylene glycol 3350 17 Gram(s) Oral daily  senna 2 Tablet(s) Oral at bedtime  sodium chloride 0.9%. 1000 milliLiter(s) (70 mL/Hr) IV Continuous <Continuous>      TELEMETRY: 	    ECG:  	  RADIOLOGY:  OTHER: 	  	  LABS:	 	    CARDIAC MARKERS:                                10.5   12.86 )-----------( 290      ( 04 Apr 2023 18:24 )             31.5     04-04    130<L>  |  87<L>  |  84<H>  ----------------------------<  131<H>  5.4<H>   |  29  |  4.26<H>    Ca    9.2      04 Apr 2023 18:24      proBNP:   Lipid Profile:   HgA1c:   TSH:     < from: TTE Limited W or WO Ultrasound Enhancing Agent (04.04.23 @ 15:15) >   1. No pericardial effusion seen.   2. Compared to the transthoracic echocardiogram performed on 3/29/2023 there have been no significant interval changes.          Assessment and plan  ---------------------------  77y m pmh BPH, Chronic venous insufficiency, HLD Hypothyroidism, S/P CABG x 2, S/P mitral valve replacement, SP Status post angioplasty, Sp TKR rt, Anasarca Pt comes to ed c/o shortness of breath for past month w progressive worsening of anasarca w swelling of scrotum and diff urinating, Has been noncompliant on diuretics for past two weeks w 20lb weight gain over past month. No fever and chills, sputum, nvdc, cp. PE Adult male lying stretcher w maked swelling to josh lower extr, w pitting edema,  chest  scant josh crackles w slight wheeze and prolonged exp phase  chf acute on chronic sec to RV dysfunction  tele, AFib hr controlled  will adjust cardiac meds  +BC i bottle, MSSA , s/p ppm explant  abx as per ID  s/p PPM and LEAD extraction  oob too chair  PICC line needs 6 weeks of abx  bladder scan was negative  acute on chronic renal failure  dc aldactone and Bumex  gentle hydration, repeat lytes in the afternoon, plan discussed with ACP  acute renal failure/ hematuria/ac was held awaiting renal eval/urology called awaiting ct abdomen and pelvis has ordered since last night awaiting results  gentle hydration  R/o interstitial nephritis sec to Ancef  ID follow up  care discussed several times yesterday with ACP

## 2023-04-05 NOTE — DIETITIAN INITIAL EVALUATION ADULT - REASON FOR ADMISSION
Chart Reviewed, Events noted  " 77  year old male h/o  CAD s/p stent , asa. A-fib/ PPM, , hypothyroid, and total left knee replacement  prior  PPM  interrogation  with  WCT/   s/p  brief   bursts  of  afib/ , on prior visit. Ct chest, retrosternal hematoma.  mod left pl effusion was  on bumex/  aldactone admitted with  worsening   pedal  edema  component  of  cellulitis   and  from  c/c  diastolic  chf,/  h/o  l/edema  of L  leg pt  admits to  being  non complaint with  meds"

## 2023-04-05 NOTE — DIETITIAN INITIAL EVALUATION ADULT - PERTINENT MEDS FT
MEDICATIONS  (STANDING):  aspirin  chewable 81 milliGRAM(s) Oral daily  atorvastatin 40 milliGRAM(s) Oral at bedtime  ceFAZolin   IVPB 1000 milliGRAM(s) IV Intermittent every 24 hours  chlorhexidine 4% Liquid 1 Application(s) Topical <User Schedule>  levothyroxine 137 MICROGram(s) Oral daily  metoprolol succinate  milliGRAM(s) Oral daily  polyethylene glycol 3350 17 Gram(s) Oral daily  senna 2 Tablet(s) Oral at bedtime  sodium chloride 0.9%. 1000 milliLiter(s) (70 mL/Hr) IV Continuous <Continuous>    MEDICATIONS  (PRN):  acetaminophen     Tablet .. 650 milliGRAM(s) Oral every 6 hours PRN Mild Pain (1 - 3)  bisacodyl Suppository 10 milliGRAM(s) Rectal daily PRN Constipation  oxyCODONE    IR 5 milliGRAM(s) Oral every 6 hours PRN Moderate Pain (4 - 6)

## 2023-04-05 NOTE — DIETITIAN INITIAL EVALUATION ADULT - OTHER INFO
Nutrition-Related Concerns:  -- Ordered for PO Synthroid   -- Hyperkalemia; levels improving; pt and significant other educated on high potassium foods     Weights:  -- Drug Dosing Weight: 99.6 kg/ 219.5 pounds  (03-30)  -- Daily Weights: 97.8 kg/215.6 pounds (04-04), 97.9 kg/ 215.8 pounds  (04-03), 99.6 kg/ 219.5 pounds (03-30), 99.9 kg/ 220.2 pounds (03-29), 102.6 kg/ 226.1 pounds (03-28), 104.1 kg/ 229.5 pounds (03-27)  -- Weight History: 102.5 kg/226.0 pounds  (3/20/22), 112.5 kg/248 pounds (6/28/23)  -- IBW: 166 pounds %IBW:130%  -- Weight changes likely secondary to fluid shifts with edema and diuretic use, will continue to monitor

## 2023-04-05 NOTE — DIETITIAN INITIAL EVALUATION ADULT - PERTINENT LABORATORY DATA
04-05    129<L>  |  87<L>  |  91<H>  ----------------------------<  202<H>  4.8   |  27  |  5.08<H>    Ca    8.7      05 Apr 2023 11:07    TPro  6.5  /  Alb  2.4<L>  /  TBili  0.5  /  DBili  x   /  AST  14  /  ALT  <5<L>  /  AlkPhos  165<H>  04-05

## 2023-04-05 NOTE — DIETITIAN INITIAL EVALUATION ADULT - NS FNS DIET ORDER
Diet, Regular:   No Concentrated Potassium  Low Sodium  No Concentrated Phosphorus (04-04-23 @ 18:48)

## 2023-04-05 NOTE — PROGRESS NOTE ADULT - SUBJECTIVE AND OBJECTIVE BOX
JANEE ARCEO 77y [id number]    Patient is a 77y old  Male who presents with a chief complaint of leg  swelling (25 Mar 2023 17:39)      Follow Up/CC:  ID following for bacteremia    Interval History/ROS: no fever    Allergies    alfuzosin (Hives)  levofloxacin (Hives)  Myrbetriq (Hives)  tamsulosin (Hives)    Intolerances        ANTIMICROBIALS:  ceFAZolin   IVPB 1000 every 24 hours      MEDICATIONS  (STANDING):  aspirin  chewable 81 milliGRAM(s) Oral daily  atorvastatin 40 milliGRAM(s) Oral at bedtime  ceFAZolin   IVPB 1000 milliGRAM(s) IV Intermittent every 24 hours  chlorhexidine 4% Liquid 1 Application(s) Topical <User Schedule>  levothyroxine 137 MICROGram(s) Oral daily  metoprolol succinate  milliGRAM(s) Oral daily  polyethylene glycol 3350 17 Gram(s) Oral daily  senna 2 Tablet(s) Oral at bedtime  sodium chloride 0.9%. 1000 milliLiter(s) (70 mL/Hr) IV Continuous <Continuous>    MEDICATIONS  (PRN):  acetaminophen     Tablet .. 650 milliGRAM(s) Oral every 6 hours PRN Mild Pain (1 - 3)  bisacodyl Suppository 10 milliGRAM(s) Rectal daily PRN Constipation  oxyCODONE    IR 5 milliGRAM(s) Oral every 6 hours PRN Moderate Pain (4 - 6)        Vital Signs Last 24 Hrs  T(C): 36.8 (2023 04:14), Max: 36.8 (2023 04:14)  T(F): 98.3 (2023 04:14), Max: 98.3 (2023 04:14)  HR: 79 (2023 04:14) (78 - 81)  BP: 106/64 (2023 04:14) (104/61 - 123/74)  BP(mean): --  RR: 18 (2023 04:14) (18 - 18)  SpO2: 95% (2023 04:14) (95% - 96%)    Parameters below as of 2023 04:14  Patient On (Oxygen Delivery Method): room air        CBC Full  -  ( 2023 11:07 )  WBC Count : 12.23 K/uL  RBC Count : 3.44 M/uL  Hemoglobin : 10.2 g/dL  Hematocrit : 31.2 %  Platelet Count - Automated : 267 K/uL  Mean Cell Volume : 90.7 fl  Mean Cell Hemoglobin : 29.7 pg  Mean Cell Hemoglobin Concentration : 32.7 gm/dL  Auto Neutrophil # : x  Auto Lymphocyte # : x  Auto Monocyte # : x  Auto Eosinophil # : x  Auto Basophil # : x  Auto Neutrophil % : x  Auto Lymphocyte % : x  Auto Monocyte % : x  Auto Eosinophil % : x  Auto Basophil % : x        130<L>  |  88<L>  |  90<H>  ----------------------------<  111<H>  5.4<H>   |  27  |  4.69<H>    Ca    9.0      2023 07:08    TPro  6.5  /  Alb  2.4<L>  /  TBili  0.5  /  DBili  x   /  AST  14  /  ALT  <5<L>  /  AlkPhos  165<H>      LIVER FUNCTIONS - ( 2023 07:08 )  Alb: 2.4 g/dL / Pro: 6.5 g/dL / ALK PHOS: 165 U/L / ALT: <5 U/L / AST: 14 U/L / GGT: x           Urinalysis Basic - ( 2023 23:13 )    Color: Red / Appearance: Turbid / S.030 / pH: x  Gluc: x / Ketone: Trace  / Bili: Negative / Urobili: Negative   Blood: x / Protein: 300 mg/dL / Nitrite: Negative   Leuk Esterase: Negative / RBC: >50 /hpf / WBC 5 /HPF   Sq Epi: x / Non Sq Epi: 1 / Bacteria: Negative        MICROBIOLOGY:  .Other Other  23   Culture is being performed. Fungal cultures are held for 4 weeks.  --  --      .Blood Blood-Peripheral  23   No Growth Final  --  --      .Blood  23   Growth in anaerobic bottle: Staphylococcus aureus  ***Blood Panel PCR results on this specimen are available  approximately 3 hours after the Gram stain result.***  Gram stain, PCR, and/or culture results may not always  correspond due to difference in methodologies.  ************************************************************  This PCR assay was performed by multiplex PCR. This  Assay tests for 66 bacterial and resistance gene targets.  Please refer to the [facility] test directory  at [email or web address] for details.  --  Blood Culture PCR  Staphylococcus aureus      .Blood  23   No Growth Final  --  --        RADIOLOGY    < from: CT Abdomen and Pelvis No Cont (23 @ 22:40) >  Slight interval increase in proximal periureteral inflammatory fat   stranding bilaterally without perinephric abscess. Bilateral   nonobstructing calculi as detailed above. No hydronephrosis.        < end of copied text >  < from: US Kidney and Bladder (23 @ 20:22) >  1.  The patient has a horseshoe kidney, which is not well demonstrated   sonographically.  There is limited visualization of the right renal   moiety.    2.  There is an approximately 2 cm nonobstructing calculus in the lower   pole of the left renal moiety.  No left renal mass or hydronephrosis is   visualized.    3.  The bladder bladder is collapsed, limiting evaluation    < end of copied text >

## 2023-04-05 NOTE — PROGRESS NOTE ADULT - SUBJECTIVE AND OBJECTIVE BOX
afberile  REVIEW OF SYSTEMS:  CONSTITUTIONAL: No fever,  no  weight loss  ENT:  No  tinnitus,   no   vertigo  NECK: No pain or stiffness  RESPIRATORY: No cough, wheezing, chills or hemoptysis;    No Shortness of Breath  CARDIOVASCULAR: No chest pain, palpitations, dizziness  GASTROINTESTINAL: No abdominal or epigastric pain. No nausea, vomiting, or hematemesis; No diarrhea  No melena or hematochezia.  GENITOURINARY: No dysuria, frequency, hematuria, or incontinence  NEUROLOGICAL: No headaches  SKIN: No itching,  no   rash  LYMPH Nodes: No enlarged glands  ENDOCRINE: No heat or cold intolerance  MUSCULOSKELETAL: No joint pain or swelling  PSYCHIATRIC: No depression, anxiety  HEME/LYMPH: No easy bruising, or bleeding gums  ALLERGY AND IMMUNOLOGIC: No hives or eczema	    MEDICATIONS  (STANDING):  aspirin  chewable 81 milliGRAM(s) Oral daily  atorvastatin 40 milliGRAM(s) Oral at bedtime  ceFAZolin   IVPB 1000 milliGRAM(s) IV Intermittent every 24 hours  chlorhexidine 4% Liquid 1 Application(s) Topical <User Schedule>  levothyroxine 137 MICROGram(s) Oral daily  metoprolol succinate  milliGRAM(s) Oral daily  polyethylene glycol 3350 17 Gram(s) Oral daily  senna 2 Tablet(s) Oral at bedtime  sodium chloride 0.9%. 1000 milliLiter(s) (70 mL/Hr) IV Continuous <Continuous>    MEDICATIONS  (PRN):  acetaminophen     Tablet .. 650 milliGRAM(s) Oral every 6 hours PRN Mild Pain (1 - 3)  bisacodyl Suppository 10 milliGRAM(s) Rectal daily PRN Constipation  oxyCODONE    IR 5 milliGRAM(s) Oral every 6 hours PRN Moderate Pain (4 - 6)      Vital Signs Last 24 Hrs  T(C): 36.8 (2023 04:14), Max: 36.8 (2023 04:14)  T(F): 98.3 (2023 04:14), Max: 98.3 (2023 04:14)  HR: 79 (2023 04:14) (60 - 81)  BP: 106/64 (2023 04:14) (104/61 - 157/73)  BP(mean): --  RR: 18 (2023 04:14) (18 - 18)  SpO2: 95% (2023 04:14) (95% - 96%)    Parameters below as of 2023 04:14  Patient On (Oxygen Delivery Method): room air      CAPILLARY BLOOD GLUCOSE        I&O's Summary    2023 07:01  -  2023 07:00  --------------------------------------------------------  IN: 1180 mL / OUT: 220 mL / NET: 960 mL          Appearance: Normal	  HEENT:   Normal oral mucosa, PERRL, EOMI	  Lymphatic: No lymphadenopathy  Cardiovascular: Normal S1 S2, No JVD  Respiratory: Lungs clear to auscultation	  Psychiatry: A & O x 3, Mood & affect appropriate  Gastrointestinal:  Soft, Non-tender, + BS	  Skin: No rash, No ecchymoses	  Extremities: Normal range of motion  Vascular: Peripheral pulses palpable bilaterally    LABS:                        10.5   12.86 )-----------( 290      ( 2023 18:24 )             31.5     04-05    130<L>  |  88<L>  |  90<H>  ----------------------------<  111<H>  5.4<H>   |  27  |  4.69<H>    Ca    9.0      2023 07:08    TPro  6.5  /  Alb  2.4<L>  /  TBili  0.5  /  DBili  x   /  AST  14  /  ALT  <5<L>  /  AlkPhos  165<H>  04-05          Urinalysis Basic - ( 2023 23:13 )    Color: Red / Appearance: Turbid / S.030 / pH: x  Gluc: x / Ketone: Trace  / Bili: Negative / Urobili: Negative   Blood: x / Protein: 300 mg/dL / Nitrite: Negative   Leuk Esterase: Negative / RBC: >50 /hpf / WBC 5 /HPF   Sq Epi: x / Non Sq Epi: 1 / Bacteria: Negative                      Consultant(s) Notes Reviewed:      Care Discussed with Consultants/Other Providers:

## 2023-04-05 NOTE — PROGRESS NOTE ADULT - ASSESSMENT
78 y/o male PMHx HTN, HLD, CAD s/p stent on ASA, A-fib, hypothyroid, CHF now presenting to the ED worsening SOB, DUNCAN, peripheral edema, scrotal edema and 20 pound unintentional weight gain over last month with MSSA bacteremia, HF    Kevin Olmos  Attending Physician   Division of Infectious Disease  Office #[phone]  Available on Microsoft Teams also  After 5pm/weekend or no response, call #[phone]

## 2023-04-05 NOTE — DIETITIAN INITIAL EVALUATION ADULT - NSFNSNUTRHOMESUPPLEMENTFT_GEN_A_CORE
-- As per H&P, took Vitamin D3 2000 intl units 1x/Day, ferrous sulfate 325 mg 1x/Day, folic acid 1 mg 1x/Day

## 2023-04-05 NOTE — DIETITIAN INITIAL EVALUATION ADULT - NSPROEDALEARNER_GEN_A_NUR
See encounter from 04/05/19 regarding BP. Patient refuses nurse visit BP check. Would like to follow up with PCP on 05/14/19.    significant other

## 2023-04-05 NOTE — CONSULT NOTE ADULT - PROBLEM SELECTOR RECOMMENDATION 2
Pt. with hyperkalemia in setting of BEULAH. Most recentl Serum potassium 5.4. Medically managed with insulin/dextrose and lokelma. Monitor serum potassium.     If any questions, please feel free to contact me     Darrin Mullins  Nephrology Fellow  [facility] Pager: [phone]
-per cardio  -check TTE

## 2023-04-05 NOTE — CHART NOTE - NSCHARTNOTEFT_GEN_A_CORE
Called by RN as pt with worsening weakness. Pt seen and evaluated at bedside. Pt noted to be with worsening weakness, and difficulty ambulating. Pt A& O X 2, forgetfull. Denies headache, dizziness, chest pain, shortness of breath, palpitations, abd pain, nausea or vomiting.     Vital Signs Last 24 Hrs  T(C): 36.9 (05 Apr 2023 23:29), Max: 36.9 (05 Apr 2023 20:20)  T(F): 98.5 (05 Apr 2023 23:29), Max: 98.5 (05 Apr 2023 23:29)  HR: 85 (05 Apr 2023 23:29) (79 - 96)  BP: 96/58 (05 Apr 2023 23:29) (96/55 - 133/62)  BP(mean): --  RR: 18 (05 Apr 2023 23:29) (18 - 18)  SpO2: 96% (05 Apr 2023 23:29) (92% - 96%)    Parameters below as of 05 Apr 2023 23:29  Patient On (Oxygen Delivery Method): nasal cannula          Labs:                          10.6   11.80 )-----------( 276      ( 05 Apr 2023 22:41 )             32.4     04-05    130<L>  |  88<L>  |  98<H>  ----------------------------<  148<H>  4.9   |  27  |  5.49<H>    Ca    8.7      05 Apr 2023 22:41  Mg     2.0     04-05    TPro  6.7  /  Alb  2.5<L>  /  TBili  0.4  /  DBili  x   /  AST  13  /  ALT  <5<L>  /  AlkPhos  166<H>  04-05        Radiology:    Physical Exam:  General: WN/WD NAD  Neurology: A&Ox2, nonfocal, HOLMAN x 4  Respiratory: diminished breath sounds   CV: RRR, S1S2  Abdominal: Soft, NT, ND   MSK: + B/L LE edema+ peripheral pulses, FROM all 4 extremity    A/P:   77y m pmh BPH, Chronic venous insufficiency, HLD Hypothyroidism, S/P CABG x 2, S/P mitral valve replacement, SP Status post angioplasty, Sp TKR rt, Anasarca Pt comes to ed c/o shortness of breath for past month w progressive worsening of anasarca w swelling of scrotum and diff urinating, Has been noncompliant on diuretics for past two weeks w 20lb weight gain over past month. Admitted with CHF exacerbation and MSSA bacteremia, course complicated by ARF, abx now changed to Vanco, off diuretices due to ARF now being evaluated for generalized weakness    Weakness  - Likely multifactorial due to deconditioning in the setting of worsening renal failure   - Mental status at baseline, A & O X 2, forgetful  - Continue with IV hydration  - CBC, BMP repeated, at baseline   - Will continue to monitor VS  - Will continue to monitor pt  F/U with primary team in AM.    Flaco Wheeler  [phone]

## 2023-04-05 NOTE — DIETITIAN INITIAL EVALUATION ADULT - ADD RECOMMEND
1) Continue Low Sodium, No Concentrated Potassium diet restrictions. Consider discontinuing no concentrated phosphorus restriction if medically approprirate to optimize PO intakes  2) Encourage PO intakes as tolerated. Honor food preferences as appropriate and available.   3) Monitor PO intake, GI tolerance, skin integrity and labs. RD remains available if needed, pt is aware.

## 2023-04-06 LAB
ALBUMIN SERPL ELPH-MCNC: 2.3 G/DL — LOW (ref 3.3–5)
ALP SERPL-CCNC: 148 U/L — HIGH (ref 40–120)
ALT FLD-CCNC: <5 U/L — LOW (ref 10–45)
ANION GAP SERPL CALC-SCNC: 14 MMOL/L — SIGNIFICANT CHANGE UP (ref 5–17)
ANION GAP SERPL CALC-SCNC: 15 MMOL/L — SIGNIFICANT CHANGE UP (ref 5–17)
ANION GAP SERPL CALC-SCNC: 16 MMOL/L — SIGNIFICANT CHANGE UP (ref 5–17)
APPEARANCE UR: ABNORMAL
AST SERPL-CCNC: 10 U/L — SIGNIFICANT CHANGE UP (ref 10–40)
BACTERIA # UR AUTO: ABNORMAL
BILIRUB SERPL-MCNC: 0.4 MG/DL — SIGNIFICANT CHANGE UP (ref 0.2–1.2)
BILIRUB UR-MCNC: ABNORMAL
BUN SERPL-MCNC: 100 MG/DL — HIGH (ref 7–23)
BUN SERPL-MCNC: 100 MG/DL — HIGH (ref 7–23)
BUN SERPL-MCNC: 99 MG/DL — HIGH (ref 7–23)
CALCIUM SERPL-MCNC: 8.3 MG/DL — LOW (ref 8.4–10.5)
CALCIUM SERPL-MCNC: 8.4 MG/DL — SIGNIFICANT CHANGE UP (ref 8.4–10.5)
CALCIUM SERPL-MCNC: 8.4 MG/DL — SIGNIFICANT CHANGE UP (ref 8.4–10.5)
CHLORIDE SERPL-SCNC: 87 MMOL/L — LOW (ref 96–108)
CHLORIDE SERPL-SCNC: 88 MMOL/L — LOW (ref 96–108)
CHLORIDE SERPL-SCNC: 88 MMOL/L — LOW (ref 96–108)
CK SERPL-CCNC: 17 U/L — LOW (ref 30–200)
CO2 SERPL-SCNC: 26 MMOL/L — SIGNIFICANT CHANGE UP (ref 22–31)
COLOR SPEC: ABNORMAL
CREAT ?TM UR-MCNC: 66 MG/DL — SIGNIFICANT CHANGE UP
CREAT SERPL-MCNC: 6.02 MG/DL — HIGH (ref 0.5–1.3)
CREAT SERPL-MCNC: 6.21 MG/DL — HIGH (ref 0.5–1.3)
CREAT SERPL-MCNC: 6.57 MG/DL — HIGH (ref 0.5–1.3)
CULTURE RESULTS: NO GROWTH — SIGNIFICANT CHANGE UP
DIFF PNL FLD: ABNORMAL
EGFR: 8 ML/MIN/1.73M2 — LOW
EGFR: 9 ML/MIN/1.73M2 — LOW
EGFR: 9 ML/MIN/1.73M2 — LOW
EPI CELLS # UR: 0 — SIGNIFICANT CHANGE UP
GLUCOSE SERPL-MCNC: 104 MG/DL — HIGH (ref 70–99)
GLUCOSE SERPL-MCNC: 131 MG/DL — HIGH (ref 70–99)
GLUCOSE SERPL-MCNC: 145 MG/DL — HIGH (ref 70–99)
GLUCOSE UR QL: NEGATIVE — SIGNIFICANT CHANGE UP
HBV SURFACE AG SER-ACNC: SIGNIFICANT CHANGE UP
HCT VFR BLD CALC: 28.2 % — LOW (ref 39–50)
HCT VFR BLD CALC: 29.8 % — LOW (ref 39–50)
HCV AB S/CO SERPL IA: 0.15 S/CO — SIGNIFICANT CHANGE UP (ref 0–0.99)
HCV AB SERPL-IMP: SIGNIFICANT CHANGE UP
HGB BLD-MCNC: 9.4 G/DL — LOW (ref 13–17)
HGB BLD-MCNC: 9.9 G/DL — LOW (ref 13–17)
HIV 1+2 AB+HIV1 P24 AG SERPL QL IA: SIGNIFICANT CHANGE UP
HYALINE CASTS # UR AUTO: 0 /LPF — SIGNIFICANT CHANGE UP (ref 0–7)
KETONES UR-MCNC: NEGATIVE — SIGNIFICANT CHANGE UP
LEUKOCYTE ESTERASE UR-ACNC: ABNORMAL
MAGNESIUM SERPL-MCNC: 2 MG/DL — SIGNIFICANT CHANGE UP (ref 1.6–2.6)
MCHC RBC-ENTMCNC: 29.6 PG — SIGNIFICANT CHANGE UP (ref 27–34)
MCHC RBC-ENTMCNC: 29.9 PG — SIGNIFICANT CHANGE UP (ref 27–34)
MCHC RBC-ENTMCNC: 33.2 GM/DL — SIGNIFICANT CHANGE UP (ref 32–36)
MCHC RBC-ENTMCNC: 33.3 GM/DL — SIGNIFICANT CHANGE UP (ref 32–36)
MCV RBC AUTO: 89 FL — SIGNIFICANT CHANGE UP (ref 80–100)
MCV RBC AUTO: 89.8 FL — SIGNIFICANT CHANGE UP (ref 80–100)
NITRITE UR-MCNC: POSITIVE
NRBC # BLD: 0 /100 WBCS — SIGNIFICANT CHANGE UP (ref 0–0)
NRBC # BLD: 0 /100 WBCS — SIGNIFICANT CHANGE UP (ref 0–0)
PH UR: 7 — SIGNIFICANT CHANGE UP (ref 5–8)
PHOSPHATE SERPL-MCNC: 6.1 MG/DL — HIGH (ref 2.5–4.5)
PLATELET # BLD AUTO: 260 K/UL — SIGNIFICANT CHANGE UP (ref 150–400)
PLATELET # BLD AUTO: 269 K/UL — SIGNIFICANT CHANGE UP (ref 150–400)
POTASSIUM SERPL-MCNC: 4.7 MMOL/L — SIGNIFICANT CHANGE UP (ref 3.5–5.3)
POTASSIUM SERPL-MCNC: 4.7 MMOL/L — SIGNIFICANT CHANGE UP (ref 3.5–5.3)
POTASSIUM SERPL-MCNC: 5.2 MMOL/L — SIGNIFICANT CHANGE UP (ref 3.5–5.3)
POTASSIUM SERPL-SCNC: 4.7 MMOL/L — SIGNIFICANT CHANGE UP (ref 3.5–5.3)
POTASSIUM SERPL-SCNC: 4.7 MMOL/L — SIGNIFICANT CHANGE UP (ref 3.5–5.3)
POTASSIUM SERPL-SCNC: 5.2 MMOL/L — SIGNIFICANT CHANGE UP (ref 3.5–5.3)
PROT ?TM UR-MCNC: 63 MG/DL — HIGH (ref 0–12)
PROT SERPL-MCNC: 6 G/DL — SIGNIFICANT CHANGE UP (ref 6–8.3)
PROT UR-MCNC: ABNORMAL
PROT/CREAT UR-RTO: 1 RATIO — HIGH (ref 0–0.2)
RBC # BLD: 3.14 M/UL — LOW (ref 4.2–5.8)
RBC # BLD: 3.35 M/UL — LOW (ref 4.2–5.8)
RBC # FLD: 14.2 % — SIGNIFICANT CHANGE UP (ref 10.3–14.5)
RBC # FLD: 14.3 % — SIGNIFICANT CHANGE UP (ref 10.3–14.5)
RBC CASTS # UR COMP ASSIST: >50 /HPF — HIGH (ref 0–4)
SODIUM SERPL-SCNC: 127 MMOL/L — LOW (ref 135–145)
SODIUM SERPL-SCNC: 129 MMOL/L — LOW (ref 135–145)
SODIUM SERPL-SCNC: 130 MMOL/L — LOW (ref 135–145)
SP GR SPEC: 1.03 — HIGH (ref 1.01–1.02)
SPECIMEN SOURCE: SIGNIFICANT CHANGE UP
UROBILINOGEN FLD QL: NEGATIVE — SIGNIFICANT CHANGE UP
WBC # BLD: 10.49 K/UL — SIGNIFICANT CHANGE UP (ref 3.8–10.5)
WBC # BLD: 11.48 K/UL — HIGH (ref 3.8–10.5)
WBC # FLD AUTO: 10.49 K/UL — SIGNIFICANT CHANGE UP (ref 3.8–10.5)
WBC # FLD AUTO: 11.48 K/UL — HIGH (ref 3.8–10.5)
WBC UR QL: 5 /HPF — SIGNIFICANT CHANGE UP (ref 0–5)

## 2023-04-06 PROCEDURE — 99232 SBSQ HOSP IP/OBS MODERATE 35: CPT

## 2023-04-06 PROCEDURE — 93971 EXTREMITY STUDY: CPT | Mod: 26,RT

## 2023-04-06 PROCEDURE — 99233 SBSQ HOSP IP/OBS HIGH 50: CPT | Mod: GC

## 2023-04-06 RX ORDER — SODIUM ZIRCONIUM CYCLOSILICATE 10 G/10G
10 POWDER, FOR SUSPENSION ORAL DAILY
Refills: 0 | Status: DISCONTINUED | OUTPATIENT
Start: 2023-04-06 | End: 2023-04-09

## 2023-04-06 RX ORDER — BUMETANIDE 0.25 MG/ML
2 INJECTION INTRAMUSCULAR; INTRAVENOUS ONCE
Refills: 0 | Status: COMPLETED | OUTPATIENT
Start: 2023-04-06 | End: 2023-04-06

## 2023-04-06 RX ADMIN — Medication 250 MILLIGRAM(S): at 05:48

## 2023-04-06 RX ADMIN — Medication 137 MICROGRAM(S): at 05:48

## 2023-04-06 RX ADMIN — ATORVASTATIN CALCIUM 40 MILLIGRAM(S): 80 TABLET, FILM COATED ORAL at 21:21

## 2023-04-06 RX ADMIN — Medication 81 MILLIGRAM(S): at 18:00

## 2023-04-06 RX ADMIN — Medication 100 MILLIGRAM(S): at 09:35

## 2023-04-06 RX ADMIN — CHLORHEXIDINE GLUCONATE 1 APPLICATION(S): 213 SOLUTION TOPICAL at 05:49

## 2023-04-06 RX ADMIN — BUMETANIDE 2 MILLIGRAM(S): 0.25 INJECTION INTRAMUSCULAR; INTRAVENOUS at 10:57

## 2023-04-06 RX ADMIN — Medication 10 MILLIGRAM(S): at 15:13

## 2023-04-06 RX ADMIN — POLYETHYLENE GLYCOL 3350 17 GRAM(S): 17 POWDER, FOR SOLUTION ORAL at 11:45

## 2023-04-06 RX ADMIN — SODIUM ZIRCONIUM CYCLOSILICATE 10 GRAM(S): 10 POWDER, FOR SUSPENSION ORAL at 11:44

## 2023-04-06 NOTE — PROGRESS NOTE ADULT - PROBLEM SELECTOR PLAN 1
-only 1 out of 4 positive - still suspect this is real   -off ancef due to concern for AIN  -repeat bcx negative  -likely source is toe from recent nail clipping   -ERENDIRA negative for vegetations  -s/p PPM extraction and micra placement   -EP input noted  -also with low grade fevers  -picc  -6 weeks iv abx - day 7 of 42 of abx  -potential side effects of abx explained including GI, Cdiff, allergy issues, development of resistance, etc.  -potential side effects of PICC explained including bleeding and infection  -weekly cbc, bun/creatinine - fax [phone]  -OR cx negative  -dose vanco by level for now

## 2023-04-06 NOTE — PROGRESS NOTE ADULT - PROBLEM SELECTOR PLAN 1
Pt. with oliguric BEULAH suspected in setting of overdiuresis and volume depletion. Differential also includes AIN perhaps in setting of Cefazolin? On review of [facility] pt noted to have a SCr of 1 on admission which since 4/1 has progressively increased to 5.49 most recently. UA with blood (suspected from delgadillo catheter insertion) and also proteinuria (significantly elevated; unable to quantify) Will repeat UA and spot urine TP/CR ratio.  Please send for further serological work up including JAGRUTI, P-ANCA, C-ANCA, Anti-GBM ab, HBsAg, Hep C antibody, HIV, Parvovirus, C3, C4, SPEP, serum immunofixation, free kappa lambda light chain, anti PLA2R.  Renal sonogram demonstrating a horseshoe kidney without evidence of hydronephrosis or renal calculi. Delgadillo catheter in place, with small amount of bloody urine. Pt euvolemic on examination. Agree with holding diuretics at this time; IVF stopped.   Pt no changed from cefazolin to Vancomycin  Thus far no acute indication for RRT however explained to patient that if continues to worsen then he may need; HD consent obtained and placed in the chart.   Monitor labs and urine output. Avoid nephrotoxins. Dose medications as per eGFR.

## 2023-04-06 NOTE — PROVIDER CONTACT NOTE (OTHER) - ASSESSMENT
Hematoma R PICC- Red/swollen- Denies pain at site- Nightshift stated flushed well   US needed ? Hematoma R PICC- Red/swollen- Denies pain at site- Nightshift stated flushed well   US needed ?    Spoke to [facility] Team- Made aware site needs redressed- Advise to wait for Doppler results, Reinforce for now

## 2023-04-06 NOTE — PROGRESS NOTE ADULT - SUBJECTIVE AND OBJECTIVE BOX
JANEE ARCEO 77y [id number]    Patient is a 77y old  Male who presents with a chief complaint of Chart Reviewed, Events noted  " 77  year old male h/o  CAD s/p stent , asa. A-fib/ PPM, , hypothyroid, and total left knee replacement  prior  PPM  interrogation  with  WCT/   s/p  brief   bursts  of  afib/ , on prior visit. Ct chest, retrosternal hematoma.  mod left pl effusion was  on bumex/  aldactone admitted with  worsening   pedal  edema  component  of  cellulitis   and  from  c/c  diastolic  chf,/  h/o  l/edema  of L  leg pt  admits to  being  non complaint with  meds"           (2023 11:57)      Follow Up/CC:  ID following for bacteremia    Interval History/ROS: no fever    Allergies    alfuzosin (Hives)  levofloxacin (Hives)  Myrbetriq (Hives)  tamsulosin (Hives)    Intolerances        ANTIMICROBIALS:      MEDICATIONS  (STANDING):  aspirin  chewable 81 milliGRAM(s) Oral daily  atorvastatin 40 milliGRAM(s) Oral at bedtime  chlorhexidine 4% Liquid 1 Application(s) Topical <User Schedule>  levothyroxine 137 MICROGram(s) Oral daily  metoprolol succinate  milliGRAM(s) Oral daily  polyethylene glycol 3350 17 Gram(s) Oral daily  senna 2 Tablet(s) Oral at bedtime  sodium chloride 0.9%. 1000 milliLiter(s) (70 mL/Hr) IV Continuous <Continuous>    MEDICATIONS  (PRN):  acetaminophen     Tablet .. 650 milliGRAM(s) Oral every 6 hours PRN Mild Pain (1 - 3)  bisacodyl Suppository 10 milliGRAM(s) Rectal daily PRN Constipation  oxyCODONE    IR 5 milliGRAM(s) Oral every 6 hours PRN Moderate Pain (4 - 6)        Vital Signs Last 24 Hrs  T(C): 36.7 (2023 04:42), Max: 36.9 (2023 20:20)  T(F): 98.1 (2023 04:42), Max: 98.5 (2023 23:29)  HR: 76 (2023 09:40) (76 - 96)  BP: 108/54 (2023 09:40) (93/59 - 133/62)  BP(mean): 72 (2023 09:40) (72 - 72)  RR: 18 (2023 09:40) (18 - 18)  SpO2: 92% (2023 09:40) (92% - 98%)    Parameters below as of 2023 09:40  Patient On (Oxygen Delivery Method): room air        CBC Full  -  ( 2023 07:06 )  WBC Count : 11.48 K/uL  RBC Count : 3.14 M/uL  Hemoglobin : 9.4 g/dL  Hematocrit : 28.2 %  Platelet Count - Automated : 260 K/uL  Mean Cell Volume : 89.8 fl  Mean Cell Hemoglobin : 29.9 pg  Mean Cell Hemoglobin Concentration : 33.3 gm/dL  Auto Neutrophil # : x  Auto Lymphocyte # : x  Auto Monocyte # : x  Auto Eosinophil # : x  Auto Basophil # : x  Auto Neutrophil % : x  Auto Lymphocyte % : x  Auto Monocyte % : x  Auto Eosinophil % : x  Auto Basophil % : x        130<L>  |  88<L>  |  100<H>  ----------------------------<  104<H>  4.7   |  26  |  6.02<H>    Ca    8.4      2023 07:06  Phos  6.1     04-  Mg     2.0         TPro  6.0  /  Alb  2.3<L>  /  TBili  0.4  /  DBili  x   /  AST  10  /  ALT  <5<L>  /  AlkPhos  148<H>      LIVER FUNCTIONS - ( 2023 07:06 )  Alb: 2.3 g/dL / Pro: 6.0 g/dL / ALK PHOS: 148 U/L / ALT: <5 U/L / AST: 10 U/L / GGT: x           Urinalysis Basic - ( 2023 23:13 )    Color: Red / Appearance: Turbid / S.030 / pH: x  Gluc: x / Ketone: Trace  / Bili: Negative / Urobili: Negative   Blood: x / Protein: 300 mg/dL / Nitrite: Negative   Leuk Esterase: Negative / RBC: >50 /hpf / WBC 5 /HPF   Sq Epi: x / Non Sq Epi: 1 / Bacteria: Negative        MICROBIOLOGY:  Clean Catch Clean Catch (Midstream)  23   No growth  --  --      .Other Other  23   Culture is being performed. Fungal cultures are held for 4 weeks.  --  --      .Blood Blood-Peripheral  23   No Growth Final  --  --      .Blood  23   Growth in anaerobic bottle: Staphylococcus aureus  ***Blood Panel PCR results on this specimen are available  approximately 3 hours after the Gram stain result.***  Gram stain, PCR, and/or culture results may not always  correspond due to difference in methodologies.  ************************************************************  This PCR assay was performed by multiplex PCR. This  Assay tests for 66 bacterial and resistance gene targets.  Please refer to the [facility] test directory  at [email or web address] for details.  --  Blood Culture PCR  Staphylococcus aureus      .Blood  23   No Growth Final  --  --              v            RADIOLOGY

## 2023-04-06 NOTE — PROGRESS NOTE ADULT - ASSESSMENT
77  year old male    h/o  CAD s/p stent , asa.      A-fib/ PPM, , hypothyroid, and total left knee replacement    prior  PPM  interrogation  with  WCT/   s/p  brief   bursts  of  afib/ , on prior visit   Ct chest, retrosternal hematoma.  mod left pl effusion    was  on bumex/  aldactone        *   admitted with    wprsening   pedal  edema        component  of  cellulitis   and  from  c/c  diastolic  chf,/  h/o  l/edema  of L  leg        pt  admits to  being  non complaint with  meds        *   h/o    c/c AFIB,   has  PPM         on eliquis     *    Anemia, , hb stable, had  been seen by  gi  eval dr joann sanchez in past     *    CAD,     cath, with 2 vessel disease,  s/p  CABG and  MVR  surg d r marni   *      h/o  Afib on    eliquis          c/c leg redness/  4 +  edema, on keflex, has  improved    *     echo,  on 7/2022,  ef  35/ new/ severe  TR          cath,   was non  obstructive    *    on synthroid          on asa/  torpol/  eliquis          crt  noted,   from  lasix// aldactone.  leg  swelling  is  lessening   *   MSSA  bacterinia           was  on iv ancef.  ,  rpt bcx    are  negative        explant pf  ppm  and  Micra  placement on  3/30/23. dr sunitha rocha   *    Echo,  ef  25,  mod  AI.  severe  TR       PICC  line  and  extended  course  of  ab. in iv cefazolin         bumex./ aldactone stopped/        BEULAH, crt is 6,  hyperkalemia, seen by [facility]/   ?  AIN,  hnece  ab was  switched  per  ID   ? need  for  renal  bx,  defr  to  [facility]         card /  dr mikayla dick         rad< from: TTE with Doppler (w/Cont) (07.05.22 @ 09:42) >  Conclusions:  Endocardial visualization enhanced with intravenous  injection of Ultrasonic Enhancing Agent (Definity).  Severe left ventricular enlargement.  Estimated ejection fraction 35%. Diffuse hypokinesis, with  inferior akinesis.  Bioprosthetic mitral valve with normal function.  Right ventricular enlargement with decreased right  ventricular systolic function.  A device wire is noted in the right heart.  ------------------------------------------------------------------------  Confirmed on  7/5/2022 - 12:16:10 by Kristian    < end of copied text >                                  77  year old male    h/o  CAD s/p stent , asa.      A-fib/ PPM, , hypothyroid, and total left knee replacement    prior  PPM  interrogation  with  WCT/   s/p  brief   bursts  of  afib/ , on prior visit   Ct chest, retrosternal hematoma.  mod left pl effusion    was  on bumex/  aldactone        *   admitted with    wprsening   pedal  edema        component  of  cellulitis   and  from  c/c  diastolic  chf,/  h/o  l/edema  of L  leg        pt  admits to  being  non complaint with  meds        *   h/o    c/c AFIB,   has  PPM         on eliquis     *    Anemia, , hb stable, had  been seen by  gi  eval dr joann sanchez in past     *    CAD,     cath, with 2 vessel disease,  s/p  CABG and  MVR  surg d r marni   *      h/o  Afib on    eliquis          c/c leg redness/  4 +  edema, on keflex, has  improved    *     echo,  on 7/2022,  ef  35/ new/ severe  TR          cath,   was non  obstructive    *    on synthroid          on asa/  torpol/  eliquis          crt  noted,   from  lasix// aldactone.  leg  swelling  is  lessening   *   MSSA  bacterinia           was  on iv ancef.  ,  rpt bcx    are  negative          explant pf  ppm  and  Micra  placement on  3/30/23. dr sunitha rocha   *    Echo,  ef  25,  mod  AI.  severe  TR        PICC  line  and  extended  course  of  ab. in iv cefazolin         bumex./ aldactone stopped/        BEULAH, crt is 6,  hyperkalemia, seen by [facility]/      ?  AIN,  hnece  ab was  switched  per  ID     ? need  for  renal  bx,  defr  to  house  renal/  awiating doppler arm, some  swelling, site picc  line         card /  dr mikayla dick         rad< from: TTE with Doppler (w/Cont) (07.05.22 @ 09:42) >  Conclusions:  Endocardial visualization enhanced with intravenous  injection of Ultrasonic Enhancing Agent (Definity).  Severe left ventricular enlargement.  Estimated ejection fraction 35%. Diffuse hypokinesis, with  inferior akinesis.  Bioprosthetic mitral valve with normal function.  Right ventricular enlargement with decreased right  ventricular systolic function.  A device wire is noted in the right heart.  ------------------------------------------------------------------------  Confirmed on  7/5/2022 - 12:16:10 by Kristian    < end of copied text >

## 2023-04-06 NOTE — PROGRESS NOTE ADULT - ASSESSMENT
Spoke with pt's nurse concerning order for consult for difficult IV insertion. Pt no longer needs consult. Order completed. 78 y/o male PMHx HTN, HLD, CAD s/p stent on ASA, A-fib, hypothyroid, CHF now presenting to the ED worsening SOB, DUNCAN, peripheral edema, scrotal edema and 20 pound unintentional weight gain over last month with MSSA bacteremia, HF    Kevin Olmos  Attending Physician   Division of Infectious Disease  Office #[phone]  Available on Microsoft Teams also  After 5pm/weekend or no response, call #[phone]

## 2023-04-06 NOTE — PROGRESS NOTE ADULT - SUBJECTIVE AND OBJECTIVE BOX
[facility] DIVISION OF KIDNEY DISEASES AND HYPERTENSION -- FOLLOW UP NOTE  --------------------------------------------------------------------------------  77 year old male with PMH of Afib, CAD s/p CABG, BPH, HLD, HTN, and hypothyroidism who presented to the hospital on 3/25 with complaints of shortness of breath and wiley gain. The patient had been nonadherent with his diuretic regimen as outpatient and presented with acute on chronic decompensated heart failure. The patient was initiated on IV diuresis with significant improvement in volume status with approximately 15lb weight loss. Hospital course was further complicated by MSSA bacteremia requiring pacemaker extraction with micra implant and currently being managed with IVC abx with PICC line in place. The nephrology team was consulted for oliguric BEULAH. On review of [facility] pt noted to have a SCr of 1 on admission which since 4/1 has progressively increased to 5.49 most recently.    24 hour events/subjective:  Pt. seen and examined this morning. Reports feeling okay but decreased appetite. As per nursing staff noted to have an episode of hypoxia overnight.   Pt remains oliguric at this time.       PAST HISTORY  --------------------------------------------------------------------------------  No significant changes to PMH, PSH, FHx, SHx, unless otherwise noted    ALLERGIES & MEDICATIONS  --------------------------------------------------------------------------------  Allergies    alfuzosin (Hives)  levofloxacin (Hives)  Myrbetriq (Hives)  tamsulosin (Hives)    Intolerances      Standing Inpatient Medications  aspirin  chewable 81 milliGRAM(s) Oral daily  atorvastatin 40 milliGRAM(s) Oral at bedtime  chlorhexidine 4% Liquid 1 Application(s) Topical <User Schedule>  levothyroxine 137 MICROGram(s) Oral daily  metoprolol succinate  milliGRAM(s) Oral daily  polyethylene glycol 3350 17 Gram(s) Oral daily  senna 2 Tablet(s) Oral at bedtime  sodium chloride 0.9%. 1000 milliLiter(s) IV Continuous <Continuous>    PRN Inpatient Medications  acetaminophen     Tablet .. 650 milliGRAM(s) Oral every 6 hours PRN  bisacodyl Suppository 10 milliGRAM(s) Rectal daily PRN  oxyCODONE    IR 5 milliGRAM(s) Oral every 6 hours PRN      REVIEW OF SYSTEMS    All other systems were reviewed and are negative, except as noted.    VITALS/PHYSICAL EXAM  --------------------------------------------------------------------------------  T(C): 36.7 (04-06-23 @ 04:42), Max: 36.9 (04-05-23 @ 20:20)  HR: 85 (04-06-23 @ 04:42) (85 - 96)  BP: 93/59 (04-06-23 @ 04:42) (93/59 - 133/62)  RR: 18 (04-06-23 @ 04:42) (18 - 18)  SpO2: 98% (04-06-23 @ 04:42) (92% - 98%)  Wt(kg): --    04-05-23 @ 07:01  -  04-06-23 @ 07:00  --------------------------------------------------------  IN: 1200 mL / OUT: 100 mL / NET: 1100 mL    Physical Exam:  	Gen: NAD  	HEENT: MMM  	Pulm: CTA B/L  	CV: S1S2  	Abd: Soft, +BS   	Ext: No LE edema B/L  	Neuro: Awake  	Skin: Warm and dry      LABS/STUDIES  --------------------------------------------------------------------------------              10.6   11.80 >-----------<  276      [04-05-23 @ 22:41]              32.4     130  |  88  |  98  ----------------------------<  148      [04-05-23 @ 22:41]  4.9   |  27  |  5.49        Ca     8.7     [04-05-23 @ 22:41]      Mg     2.0     [04-05-23 @ 22:41]    TPro  6.7  /  Alb  2.5  /  TBili  0.4  /  DBili  x   /  AST  13  /  ALT  <5  /  AlkPhos  166  [04-05-23 @ 22:41]          Creatinine Trend:  SCr 5.49 [04-05 @ 22:41]  SCr 5.08 [04-05 @ 11:07]  SCr 4.69 [04-05 @ 07:08]  SCr 4.26 [04-04 @ 18:24]  SCr 3.37 [04-04 @ 06:48]    Urinalysis - [04-04-23 @ 23:13]      Color Red / Appearance Turbid / SG 1.030 / pH 8.0      Gluc 100 mg/dL / Ketone Trace  / Bili Negative / Urobili Negative       Blood Large / Protein 300 mg/dL / Leuk Est Negative / Nitrite Negative      RBC >50 / WBC 5 / Hyaline 0 / Gran  / Sq Epi  / Non Sq Epi 1 / Bacteria Negative    Urine Creatinine 42      [04-05-23 @ 14:53]  Urine Protein >2000      [04-05-23 @ 14:53]  Urine Sodium 111      [04-05-23 @ 14:53]  Urine Potassium 29      [04-05-23 @ 14:53]  Urine Chloride 71      [04-05-23 @ 14:53]  Urine Osmolality 299      [04-05-23 @ 14:53]

## 2023-04-06 NOTE — PROGRESS NOTE ADULT - SUBJECTIVE AND OBJECTIVE BOX
afberile    REVIEW OF SYSTEMS:  CONSTITUTIONAL: No fever,  no  weight loss  ENT:  No  tinnitus,   no   vertigo  NECK: No pain or stiffness  RESPIRATORY: No cough, wheezing, chills or hemoptysis;    No Shortness of Breath  CARDIOVASCULAR: No chest pain, palpitations, dizziness  GASTROINTESTINAL: No abdominal or epigastric pain. No nausea, vomiting, or hematemesis; No diarrhea  No melena or hematochezia.  GENITOURINARY: No dysuria, frequency, hematuria, or incontinence  NEUROLOGICAL: No headaches  SKIN: No itching,  no   rash  LYMPH Nodes: No enlarged glands  ENDOCRINE: No heat or cold intolerance  MUSCULOSKELETAL: No joint pain or swelling  PSYCHIATRIC: No depression, anxiety  HEME/LYMPH: No easy bruising, or bleeding gums  ALLERGY AND IMMUNOLOGIC: No hives or eczema	    MEDICATIONS  (STANDING):  aspirin  chewable 81 milliGRAM(s) Oral daily  atorvastatin 40 milliGRAM(s) Oral at bedtime  chlorhexidine 4% Liquid 1 Application(s) Topical <User Schedule>  levothyroxine 137 MICROGram(s) Oral daily  metoprolol succinate  milliGRAM(s) Oral daily  polyethylene glycol 3350 17 Gram(s) Oral daily  senna 2 Tablet(s) Oral at bedtime  sodium chloride 0.9%. 1000 milliLiter(s) (70 mL/Hr) IV Continuous <Continuous>    MEDICATIONS  (PRN):  acetaminophen     Tablet .. 650 milliGRAM(s) Oral every 6 hours PRN Mild Pain (1 - 3)  bisacodyl Suppository 10 milliGRAM(s) Rectal daily PRN Constipation  oxyCODONE    IR 5 milliGRAM(s) Oral every 6 hours PRN Moderate Pain (4 - 6)      Vital Signs Last 24 Hrs  T(C): 36.7 (2023 04:42), Max: 36.9 (2023 20:20)  T(F): 98.1 (2023 04:42), Max: 98.5 (2023 23:29)  HR: 85 (2023 04:42) (85 - 96)  BP: 93/59 (2023 04:42) (93/59 - 133/62)  BP(mean): --  RR: 18 (2023 04:42) (18 - 18)  SpO2: 98% (2023 04:42) (92% - 98%)    Parameters below as of 2023 04:42  Patient On (Oxygen Delivery Method): nasal cannula      CAPILLARY BLOOD GLUCOSE      POCT Blood Glucose.: 140 mg/dL (2023 21:44)    I&O's Summary    2023 07:01  -  2023 07:00  --------------------------------------------------------  IN: 1200 mL / OUT: 100 mL / NET: 1100 mL          Appearance: Normal	  HEENT:   Normal oral mucosa, PERRL, EOMI	  Lymphatic: No lymphadenopathy  Cardiovascular: Normal S1 S2, No JVD  Respiratory: Lungs clear to auscultation	  Psychiatry: A & O x 3, Mood & affect appropriate  Gastrointestinal:  Soft, Non-tender, + BS	  Skin: No rash, No ecchymoses	  Extremities: Normal range of motion  Vascular: Peripheral pulses palpable bilaterally    LABS:                        9.4    11.48 )-----------( 260      ( 2023 07:06 )             28.2     04-06    130<L>  |  88<L>  |  100<H>  ----------------------------<  104<H>  4.7   |  26  |  6.02<H>    Ca    8.4      2023 07:06  Phos  6.1     04-06  Mg     2.0     04-06    TPro  6.0  /  Alb  2.3<L>  /  TBili  0.4  /  DBili  x   /  AST  10  /  ALT  <5<L>  /  AlkPhos  148<H>  04-06          Urinalysis Basic - ( 2023 23:13 )    Color: Red / Appearance: Turbid / S.030 / pH: x  Gluc: x / Ketone: Trace  / Bili: Negative / Urobili: Negative   Blood: x / Protein: 300 mg/dL / Nitrite: Negative   Leuk Esterase: Negative / RBC: >50 /hpf / WBC 5 /HPF   Sq Epi: x / Non Sq Epi: 1 / Bacteria: Negative                      Consultant(s) Notes Reviewed:      Care Discussed with Consultants/Other Providers:

## 2023-04-06 NOTE — PROGRESS NOTE ADULT - SUBJECTIVE AND OBJECTIVE BOX
CARDIOLOGY     PROGRESS  NOTE   ________________________________________________    CHIEF COMPLAINT:Patient is a 77y old  Male who presents with a chief complaint of Chart Reviewed, Events noted  " 77  year old male h/o  CAD s/p stent , asa. A-fib/ PPM, , hypothyroid, and total left knee replacement  prior  PPM  interrogation  with  [facility]/   s/p  brief   bursts  of  afib/ , on prior visit. Ct chest, retrosternal hematoma.  mod left pl effusion was  on bumex/  aldactone admitted with  worsening   pedal  edema  component  of  cellulitis   and  from  c/c  diastolic  chf,/  h/o  l/edema  of L  leg pt  admits to  being  non complaint with  meds"   no complain    	  REVIEW OF SYSTEMS:  CONSTITUTIONAL: No fever, weight loss, or fatigue  EYES: No eye pain, visual disturbances, or discharge  ENT:  No difficulty hearing, tinnitus, vertigo; No sinus or throat pain  NECK: No pain or stiffness  RESPIRATORY: No cough, wheezing, chills or hemoptysis; No Shortness of Breath  CARDIOVASCULAR: No chest pain, palpitations, passing out, dizziness, or leg swelling  GASTROINTESTINAL: No abdominal or epigastric pain. No nausea, vomiting, or hematemesis; No diarrhea or constipation. No melena or hematochezia.  GENITOURINARY: No dysuria, frequency, hematuria, or incontinence  NEUROLOGICAL: No headaches, memory loss, loss of strength, numbness, or tremors  SKIN: No itching, burning, rashes, or lesions   LYMPH Nodes: No enlarged glands  ENDOCRINE: No heat or cold intolerance; No hair loss  MUSCULOSKELETAL: No joint pain or swelling; No muscle, back, or extremity pain  PSYCHIATRIC: No depression, anxiety, mood swings, or difficulty sleeping  HEME/LYMPH: No easy bruising, or bleeding gums  ALLERGY AND IMMUNOLOGIC: No hives or eczema	    [x ] All others negative	  [ ] Unable to obtain    PHYSICAL EXAM:  T(C): 36.7 (04-06-23 @ 04:42), Max: 36.9 (04-05-23 @ 20:20)  HR: 85 (04-06-23 @ 04:42) (85 - 96)  BP: 93/59 (04-06-23 @ 04:42) (93/59 - 133/62)  RR: 18 (04-06-23 @ 04:42) (18 - 18)  SpO2: 98% (04-06-23 @ 04:42) (92% - 98%)  Wt(kg): --  I&O's Summary    05 Apr 2023 07:01  -  06 Apr 2023 07:00  --------------------------------------------------------  IN: 1200 mL / OUT: 100 mL / NET: 1100 mL    Appearance: Normal	  HEENT:   Normal oral mucosa, PERRL, EOMI	  Lymphatic: No lymphadenopathy  Cardiovascular: Normal S1 S2, No JVD, + murmurs, No edema  Respiratory: Lungs clear to auscultation	  Psychiatry: A & O x 3, Mood & affect appropriate  Gastrointestinal:  Soft, Non-tender, + BS	  Skin: No rashes, No ecchymoses, No cyanosis	  Neurologic: Non-focal  Extremities: Normal range of motion, No clubbing, cyanosis or edema  Vascular: Peripheral pulses palpable 2+ bilaterally    MEDICATIONS  (STANDING):  aspirin  chewable 81 milliGRAM(s) Oral daily  atorvastatin 40 milliGRAM(s) Oral at bedtime  chlorhexidine 4% Liquid 1 Application(s) Topical <User Schedule>  levothyroxine 137 MICROGram(s) Oral daily  metoprolol succinate  milliGRAM(s) Oral daily  polyethylene glycol 3350 17 Gram(s) Oral daily  senna 2 Tablet(s) Oral at bedtime  sodium chloride 0.9%. 1000 milliLiter(s) (70 mL/Hr) IV Continuous <Continuous>      TELEMETRY: 	    ECG:  	  RADIOLOGY:  OTHER: 	  	  LABS:	 	    CARDIAC MARKERS:                                9.4    11.48 )-----------( 260      ( 06 Apr 2023 07:06 )             28.2     04-06    130<L>  |  88<L>  |  100<H>  ----------------------------<  104<H>  4.7   |  26  |  6.02<H>    Ca    8.4      06 Apr 2023 07:06  Phos  6.1     04-06  Mg     2.0     04-06    TPro  6.0  /  Alb  2.3<L>  /  TBili  0.4  /  DBili  x   /  AST  10  /  ALT  <5<L>  /  AlkPhos  148<H>  04-06    proBNP:   Lipid Profile:   HgA1c:   TSH:       ·  Problem: BEULAH (acute kidney injury).   ·  Plan: Pt. with oliguric BEULAH suspected in setting of overdiuresis and volume depletion. Differential also includes AIN perhaps in setting of Cefazolin? On review of [facility] pt noted to have a SCr of 1 on admission which since 4/1 has progressively increased to 5.49 most recently. UA with blood (suspected from delgadillo catheter insertion) and also proteinuria (significantly elevated; unable to quantify) Will repeat UA and spot urine TP/CR ratio.  Please send for further serological work up including JAGRUTI, P-ANCA, C-ANCA, Anti-GBM ab, HBsAg, Hep C antibody, HIV, Parvovirus, C3, C4, SPEP, serum immunofixation, free kappa lambda light chain, anti PLA2R.  Renal sonogram demonstrating a horseshoe kidney without evidence of hydronephrosis or renal calculi. Delgadillo catheter in place, with small amount of bloody urine. Pt euvolemic on examination. Agree with holding diuretics at this time; IVF stopped.   Pt no changed from cefazolin to Vancomycin  Thus far no acute indication for RRT however explained to patient that if continues to worsen then he may need; HD consent obtained and placed in the chart.   Monitor labs and urine output. Avoid nephrotoxins. Dose medications as per eGFR.          Assessment and plan  ---------------------------  77y m pmh BPH, Chronic venous insufficiency, HLD Hypothyroidism, S/P CABG x 2, S/P mitral valve replacement, SP Status post angioplasty, Sp TKR rt, Anasarca Pt comes to ed c/o shortness of breath for past month w progressive worsening of anasarca w swelling of scrotum and diff urinating, Has been noncompliant on diuretics for past two weeks w 20lb weight gain over past month. No fever and chills, sputum, nvdc, cp. PE Adult male lying stretcher w maked swelling to josh lower extr, w pitting edema,  chest  scant josh crackles w slight wheeze and prolonged exp phase  chf acute on chronic sec to RV dysfunction  tele, AFib hr controlled  will adjust cardiac meds  +BC i bottle, MSSA , s/p ppm explant  abx as per ID  s/p PPM and LEAD extraction  oob too chair  PICC line needs 6 weeks of abx  bladder scan was negative  acute on chronic renal failure/ renal appreciated  dc aldactone and Bumex  gentle hydration, repeat lytes in the afternoon, plan discussed with ACP  acute renal failure/ hematuria/ac was held awaiting renal eval/urology called awaiting ct abdomen and pelvis has ordered since last night awaiting results  gentle hydration  ID follow up regarding abx  ac held sec to hematuria, may re start on ac as clear by urology  beta blocker sed to rate control and hx of cad, s/p CABG  worsening renal failure may need HD

## 2023-04-06 NOTE — PROGRESS NOTE ADULT - ATTENDING COMMENTS
mssa bacteremia, ppm explant  # peter- horsehoe kidney  atn vs ain vs other  proteinuria/hematuria; recheck prot/cr   serologic GN workup neg, check c3,c4  FLUSH delgadillo given hematuria- check bladder scan to make sure no retention  abx changed to vanco; dose by level   consider gallium scan?   monitor UO  bun.cr uptrending  d/w pt and GF in detail; no urgent indication for dialysis but it may reach a point where he may need it; they are in agrrement if needed  # volume overload  Echo,  ef  25,  mod  AI.  severe  TR  bumex./ aldactone stopped  now with increasing LE edema per pt and girlfriend at bedside  restart bumex 2mg iv today  #hyponatremia na 130; monitor trend; hypervolemic; check sosm, u osm

## 2023-04-07 LAB
ANION GAP SERPL CALC-SCNC: 15 MMOL/L — SIGNIFICANT CHANGE UP (ref 5–17)
ANION GAP SERPL CALC-SCNC: 17 MMOL/L — SIGNIFICANT CHANGE UP (ref 5–17)
APTT BLD: 29.4 SEC — SIGNIFICANT CHANGE UP (ref 27.5–35.5)
APTT BLD: 32.8 SEC — SIGNIFICANT CHANGE UP (ref 27.5–35.5)
BUN SERPL-MCNC: 102 MG/DL — HIGH (ref 7–23)
BUN SERPL-MCNC: 105 MG/DL — HIGH (ref 7–23)
C3 SERPL-MCNC: 126 MG/DL — SIGNIFICANT CHANGE UP (ref 81–157)
C4 SERPL-MCNC: 25 MG/DL — SIGNIFICANT CHANGE UP (ref 13–39)
CALCIUM SERPL-MCNC: 8 MG/DL — LOW (ref 8.4–10.5)
CALCIUM SERPL-MCNC: 8.5 MG/DL — SIGNIFICANT CHANGE UP (ref 8.4–10.5)
CHLORIDE SERPL-SCNC: 86 MMOL/L — LOW (ref 96–108)
CHLORIDE SERPL-SCNC: 87 MMOL/L — LOW (ref 96–108)
CO2 SERPL-SCNC: 24 MMOL/L — SIGNIFICANT CHANGE UP (ref 22–31)
CO2 SERPL-SCNC: 25 MMOL/L — SIGNIFICANT CHANGE UP (ref 22–31)
CREAT SERPL-MCNC: 7.01 MG/DL — HIGH (ref 0.5–1.3)
CREAT SERPL-MCNC: 7.57 MG/DL — HIGH (ref 0.5–1.3)
DEPRECATED KAPPA LC FREE/LAMBDA SER: 1 RATIO — SIGNIFICANT CHANGE UP (ref 0.7–6.02)
EGFR: 7 ML/MIN/1.73M2 — LOW
EGFR: 8 ML/MIN/1.73M2 — LOW
GLUCOSE SERPL-MCNC: 112 MG/DL — HIGH (ref 70–99)
GLUCOSE SERPL-MCNC: 163 MG/DL — HIGH (ref 70–99)
HCT VFR BLD CALC: 27.9 % — LOW (ref 39–50)
HCT VFR BLD CALC: 28.6 % — LOW (ref 39–50)
HGB BLD-MCNC: 9.2 G/DL — LOW (ref 13–17)
HGB BLD-MCNC: 9.5 G/DL — LOW (ref 13–17)
KAPPA LC UR-MCNC: 120.31 MG/L — HIGH
LAMBDA LC UR-MCNC: 120.6 MG/L — HIGH
MAGNESIUM SERPL-MCNC: 1.9 MG/DL — SIGNIFICANT CHANGE UP (ref 1.6–2.6)
MCHC RBC-ENTMCNC: 29.4 PG — SIGNIFICANT CHANGE UP (ref 27–34)
MCHC RBC-ENTMCNC: 29.6 PG — SIGNIFICANT CHANGE UP (ref 27–34)
MCHC RBC-ENTMCNC: 33 GM/DL — SIGNIFICANT CHANGE UP (ref 32–36)
MCHC RBC-ENTMCNC: 33.2 GM/DL — SIGNIFICANT CHANGE UP (ref 32–36)
MCV RBC AUTO: 88.5 FL — SIGNIFICANT CHANGE UP (ref 80–100)
MCV RBC AUTO: 89.7 FL — SIGNIFICANT CHANGE UP (ref 80–100)
NRBC # BLD: 0 /100 WBCS — SIGNIFICANT CHANGE UP (ref 0–0)
NRBC # BLD: 0 /100 WBCS — SIGNIFICANT CHANGE UP (ref 0–0)
PHOSPHATE SERPL-MCNC: 6.6 MG/DL — HIGH (ref 2.5–4.5)
PLATELET # BLD AUTO: 278 K/UL — SIGNIFICANT CHANGE UP (ref 150–400)
PLATELET # BLD AUTO: 286 K/UL — SIGNIFICANT CHANGE UP (ref 150–400)
POTASSIUM SERPL-MCNC: 4.9 MMOL/L — SIGNIFICANT CHANGE UP (ref 3.5–5.3)
POTASSIUM SERPL-MCNC: 5.1 MMOL/L — SIGNIFICANT CHANGE UP (ref 3.5–5.3)
POTASSIUM SERPL-SCNC: 4.9 MMOL/L — SIGNIFICANT CHANGE UP (ref 3.5–5.3)
POTASSIUM SERPL-SCNC: 5.1 MMOL/L — SIGNIFICANT CHANGE UP (ref 3.5–5.3)
PROT SERPL-MCNC: 5.7 G/DL — LOW (ref 6–8.3)
PROT SERPL-MCNC: 5.7 G/DL — LOW (ref 6–8.3)
RBC # BLD: 3.11 M/UL — LOW (ref 4.2–5.8)
RBC # BLD: 3.23 M/UL — LOW (ref 4.2–5.8)
RBC # FLD: 14.2 % — SIGNIFICANT CHANGE UP (ref 10.3–14.5)
RBC # FLD: 14.5 % — SIGNIFICANT CHANGE UP (ref 10.3–14.5)
SODIUM SERPL-SCNC: 127 MMOL/L — LOW (ref 135–145)
SODIUM SERPL-SCNC: 127 MMOL/L — LOW (ref 135–145)
VANCOMYCIN FLD-MCNC: 7.9 UG/ML — SIGNIFICANT CHANGE UP
WBC # BLD: 9.31 K/UL — SIGNIFICANT CHANGE UP (ref 3.8–10.5)
WBC # BLD: 9.97 K/UL — SIGNIFICANT CHANGE UP (ref 3.8–10.5)
WBC # FLD AUTO: 9.31 K/UL — SIGNIFICANT CHANGE UP (ref 3.8–10.5)
WBC # FLD AUTO: 9.97 K/UL — SIGNIFICANT CHANGE UP (ref 3.8–10.5)

## 2023-04-07 PROCEDURE — 99233 SBSQ HOSP IP/OBS HIGH 50: CPT | Mod: GC

## 2023-04-07 PROCEDURE — 99232 SBSQ HOSP IP/OBS MODERATE 35: CPT

## 2023-04-07 RX ORDER — VANCOMYCIN HCL 1 G
1250 VIAL (EA) INTRAVENOUS ONCE
Refills: 0 | Status: COMPLETED | OUTPATIENT
Start: 2023-04-07 | End: 2023-04-07

## 2023-04-07 RX ORDER — HEPARIN SODIUM 5000 [USP'U]/ML
900 INJECTION INTRAVENOUS; SUBCUTANEOUS
Qty: 25000 | Refills: 0 | Status: DISCONTINUED | OUTPATIENT
Start: 2023-04-07 | End: 2023-04-08

## 2023-04-07 RX ORDER — HEPARIN SODIUM 5000 [USP'U]/ML
800 INJECTION INTRAVENOUS; SUBCUTANEOUS
Qty: 25000 | Refills: 0 | Status: DISCONTINUED | OUTPATIENT
Start: 2023-04-07 | End: 2023-04-07

## 2023-04-07 RX ADMIN — POLYETHYLENE GLYCOL 3350 17 GRAM(S): 17 POWDER, FOR SOLUTION ORAL at 21:28

## 2023-04-07 RX ADMIN — SENNA PLUS 2 TABLET(S): 8.6 TABLET ORAL at 21:28

## 2023-04-07 RX ADMIN — Medication 166.67 MILLIGRAM(S): at 13:26

## 2023-04-07 RX ADMIN — CHLORHEXIDINE GLUCONATE 1 APPLICATION(S): 213 SOLUTION TOPICAL at 05:41

## 2023-04-07 RX ADMIN — ATORVASTATIN CALCIUM 40 MILLIGRAM(S): 80 TABLET, FILM COATED ORAL at 21:28

## 2023-04-07 RX ADMIN — Medication 137 MICROGRAM(S): at 05:41

## 2023-04-07 RX ADMIN — HEPARIN SODIUM 8 UNIT(S)/HR: 5000 INJECTION INTRAVENOUS; SUBCUTANEOUS at 17:30

## 2023-04-07 RX ADMIN — Medication 100 MILLIGRAM(S): at 05:43

## 2023-04-07 RX ADMIN — Medication 81 MILLIGRAM(S): at 13:27

## 2023-04-07 RX ADMIN — SODIUM ZIRCONIUM CYCLOSILICATE 10 GRAM(S): 10 POWDER, FOR SUSPENSION ORAL at 13:27

## 2023-04-07 RX ADMIN — HEPARIN SODIUM 8 UNIT(S)/HR: 5000 INJECTION INTRAVENOUS; SUBCUTANEOUS at 09:36

## 2023-04-07 NOTE — PROGRESS NOTE ADULT - PROBLEM SELECTOR PLAN 1
Pt. with oliguric BEULAH suspected in setting of overdiuresis and volume depletion. Differential also includes AIN perhaps in setting of Cefazolin? On review of [facility] pt noted to have a SCr of 1 on admission which since 4/1 has progressively increased to 7 most recently. UA with blood (suspected from delgadillo catheter insertion) and also proteinuria of 1.0g (initial elevation likely from gross hematuria and over-estimated).   Pending JAGRUTI, P-ANCA, C-ANCA, Anti-GBM ab, HBsAg, Hep C antibody, HIV, Parvovirus, C3, C4, SPEP, serum immunofixation, free kappa lambda light chain, anti PLA2R.  Renal sonogram demonstrating a horseshoe kidney without evidence of hydronephrosis or renal calculi. Delgadillo catheter in place, with small amount of bloody urine. Pt euvolemic on examination.   Pt clinically volume overloaded; recommend another dose of IV bumex 2mg right now.  Pt no changed from cefazolin to Vancomycin  Thus far no acute indication for RRT however explained to patient that if continues to worsen then he may need; HD consent obtained and placed in the chart.   Monitor labs and urine output. Avoid nephrotoxins. Dose medications as per eGFR.

## 2023-04-07 NOTE — PROGRESS NOTE ADULT - ATTENDING COMMENTS
mssa bacteremia, ppm explant  # peter- horsehoe kidney  atn vs ain vs other  bun.cr uptrending, lytes stable, volume status stable  proteinuria/hematuria; recheck prot/cr now prot/cr 1.0   serologic GN workup neg, check c3,c4  FLUSH delgadillo given hematuria- check bladder scan to make sure no retention  abx changed to vanco; dose by level   monitor UO  d/w pt and GF in detail; no urgent indication for dialysis but it may reach a point where he may need it; they are in agrrement if needed  # volume overload  Echo,  ef  25,  mod  AI.  severe  TR  bumex./ aldactone stopped  now with increasing LE edema per pt and girlfriend at bedside  restart bumex 2mg iv today  #hyponatremia na 130; monitor trend; hypervolemic; check sosm, u osm mssa bacteremia, ppm explant  # peter- horsehoe kidney  atn vs ain vs other  bun.cr uptrending, lytes stable, volume status stable  proteinuria/hematuria; recheck prot/cr now prot/cr 1.0   serologic GN workup neg, check c3,c4  FLUSH delgadillo given hematuria- check bladder scan to make sure no retention  abx changed to vanco; dose by level   monitor UO  d/w pt in detail; no urgent indication for dialysis but it may reach a point where he may need it;   # volume overload  Echo,  ef  25,  mod  AI.  severe  TR  bumex./ aldactone stopped  + LE edema - can give  bumex 2mg iv today  #hyperkalemia- k stable; monitor trend; Lokelma daily  #hyponatremia; monitor trend; check sosm, u osm

## 2023-04-07 NOTE — PROGRESS NOTE ADULT - SUBJECTIVE AND OBJECTIVE BOX
afebrile  REVIEW OF SYSTEMS:  CONSTITUTIONAL: No fever,  no  weight loss  ENT:  No  tinnitus,   no   vertigo  NECK: No pain or stiffness  RESPIRATORY: No cough, wheezing, chills or hemoptysis;    No Shortness of Breath  CARDIOVASCULAR: No chest pain, palpitations, dizziness  GASTROINTESTINAL: No abdominal or epigastric pain. No nausea, vomiting, or hematemesis; No diarrhea  No melena or hematochezia.  GENITOURINARY: No dysuria, frequency, hematuria, or incontinence  NEUROLOGICAL: No headaches  SKIN: No itching,  no   rash  LYMPH Nodes: No enlarged glands  ENDOCRINE: No heat or cold intolerance  MUSCULOSKELETAL: No joint pain or swelling  PSYCHIATRIC: No depression, anxiety  HEME/LYMPH: No easy bruising, or bleeding gums  ALLERGY AND IMMUNOLOGIC: No hives or eczema	    MEDICATIONS  (STANDING):  aspirin  chewable 81 milliGRAM(s) Oral daily  atorvastatin 40 milliGRAM(s) Oral at bedtime  chlorhexidine 4% Liquid 1 Application(s) Topical <User Schedule>  heparin  Infusion 800 Unit(s)/Hr (8 mL/Hr) IV Continuous <Continuous>  levothyroxine 137 MICROGram(s) Oral daily  metoprolol succinate  milliGRAM(s) Oral daily  polyethylene glycol 3350 17 Gram(s) Oral daily  senna 2 Tablet(s) Oral at bedtime  sodium zirconium cyclosilicate 10 Gram(s) Oral daily    MEDICATIONS  (PRN):  acetaminophen     Tablet .. 650 milliGRAM(s) Oral every 6 hours PRN Mild Pain (1 - 3)  bisacodyl Suppository 10 milliGRAM(s) Rectal daily PRN Constipation      Vital Signs Last 24 Hrs  T(C): 36.4 (2023 04:41), Max: 37 (2023 20:58)  T(F): 97.6 (2023 04:41), Max: 98.6 (2023 20:58)  HR: 76 (2023 04:41) (76 - 89)  BP: 106/67 (2023 04:41) (94/54 - 111/74)  BP(mean): 80 (2023 11:48) (72 - 80)  RR: 18 (2023 04:41) (18 - 18)  SpO2: 91% (2023 04:41) (91% - 94%)    Parameters below as of 2023 04:41  Patient On (Oxygen Delivery Method): room air      CAPILLARY BLOOD GLUCOSE        I&O's Summary    2023 07:01  -  2023 07:00  --------------------------------------------------------  IN: 540 mL / OUT: 125 mL / NET: 415 mL          Appearance: Normal	  HEENT:   Normal oral mucosa, PERRL, EOMI	  Lymphatic: No lymphadenopathy  Cardiovascular: Normal S1 S2, No JVD  Respiratory: Lungs clear to auscultation	  Psychiatry: A & O x 3, Mood & affect appropriate  Gastrointestinal:  Soft, Non-tender, + BS	  Skin: No rash, No ecchymoses	  Extremities: Normal range of motion  Vascular: Peripheral pulses palpable bilaterally/  +  edema    LABS:                        9.2    9.97  )-----------( 278      ( 2023 06:14 )             27.9     04-07    127<L>  |  87<L>  |  102<H>  ----------------------------<  112<H>  5.1   |  25  |  7.01<H>    Ca    8.0<L>      2023 06:12  Phos  6.6     04-07  Mg     1.9     04-07    TPro  5.7<L>  /  Alb  x   /  TBili  x   /  DBili  x   /  AST  x   /  ALT  x   /  AlkPhos  x   04-06      CARDIAC MARKERS ( 2023 11:28 )  x     / x     / 17 U/L / x     / x          Urinalysis Basic - ( 2023 09:02 )    Color: Red / Appearance: Turbid / S.027 / pH: x  Gluc: x / Ketone: Negative  / Bili: Small / Urobili: Negative   Blood: x / Protein: 300 mg/dL / Nitrite: Positive   Leuk Esterase: Small / RBC: >50 /hpf / WBC 5 /HPF   Sq Epi: x / Non Sq Epi: x / Bacteria: Few                      Consultant(s) Notes Reviewed:      Care Discussed with Consultants/Other Providers:

## 2023-04-07 NOTE — PROGRESS NOTE ADULT - SUBJECTIVE AND OBJECTIVE BOX
Date of Service: 04-07-23 @ 08:06           CARDIOLOGY     PROGRESS  NOTE   ________________________________________________    CHIEF COMPLAINT:Patient is a 77y old  Male who presents with a chief complaint of Chart Reviewed, Events noted  " 77  year old male h/o  CAD s/p stent , asa. A-fib/ PPM, , hypothyroid, and total left knee replacement  prior  PPM  interrogation  with  [facility]/   s/p  brief   bursts  of  afib/ , on prior visit. Ct chest, retrosternal hematoma.  mod left pl effusion was  on bumex/  aldactone admitted with  worsening   pedal  edema  component  of  cellulitis   and  from  c/c  diastolic  chf,/  h/o  l/edema  of L  leg pt  admits to  being  non complaint with  meds"    no complain    	  REVIEW OF SYSTEMS:  CONSTITUTIONAL: No fever, weight loss, or fatigue  EYES: No eye pain, visual disturbances, or discharge  ENT:  No difficulty hearing, tinnitus, vertigo; No sinus or throat pain  NECK: No pain or stiffness  RESPIRATORY: No cough, wheezing, chills or hemoptysis; No Shortness of Breath  CARDIOVASCULAR: No chest pain, palpitations, passing out, dizziness, or leg swelling  GASTROINTESTINAL: No abdominal or epigastric pain. No nausea, vomiting, or hematemesis; No diarrhea or constipation. No melena or hematochezia.  GENITOURINARY: No dysuria, frequency, hematuria, or incontinence  NEUROLOGICAL: No headaches, memory loss, loss of strength, numbness, or tremors  SKIN: No itching, burning, rashes, or lesions   LYMPH Nodes: No enlarged glands  ENDOCRINE: No heat or cold intolerance; No hair loss  MUSCULOSKELETAL: No joint pain or swelling; No muscle, back, or extremity pain  PSYCHIATRIC: No depression, anxiety, mood swings, or difficulty sleeping  HEME/LYMPH: No easy bruising, or bleeding gums  ALLERGY AND IMMUNOLOGIC: No hives or eczema	    [x ] All others negative	  [ ] Unable to obtain    PHYSICAL EXAM:  T(C): 36.4 (04-07-23 @ 04:41), Max: 37 (04-06-23 @ 20:58)  HR: 76 (04-07-23 @ 04:41) (76 - 89)  BP: 106/67 (04-07-23 @ 04:41) (94/54 - 111/74)  RR: 18 (04-07-23 @ 04:41) (18 - 18)  SpO2: 91% (04-07-23 @ 04:41) (91% - 94%)  Wt(kg): --  I&O's Summary    06 Apr 2023 07:01  -  07 Apr 2023 07:00  --------------------------------------------------------  IN: 540 mL / OUT: 125 mL / NET: 415 mL        Appearance: Normal	  HEENT:   Normal oral mucosa, PERRL, EOMI	  Lymphatic: No lymphadenopathy  Cardiovascular: Normal S1 S2, No JVD, + murmurs, No edema  Respiratory: rhonchi  Psychiatry: A & O x 3, Mood & affect appropriate  Gastrointestinal:  Soft, Non-tender, + BS	  Skin: No rashes, No ecchymoses, No cyanosis	  Neurologic: Non-focal  Extremities: Normal range of motion, No clubbing, cyanosis or edema  Vascular: Peripheral pulses palpable 2+ bilaterally    MEDICATIONS  (STANDING):  aspirin  chewable 81 milliGRAM(s) Oral daily  atorvastatin 40 milliGRAM(s) Oral at bedtime  chlorhexidine 4% Liquid 1 Application(s) Topical <User Schedule>  levothyroxine 137 MICROGram(s) Oral daily  metoprolol succinate  milliGRAM(s) Oral daily  polyethylene glycol 3350 17 Gram(s) Oral daily  senna 2 Tablet(s) Oral at bedtime  sodium zirconium cyclosilicate 10 Gram(s) Oral daily      TELEMETRY: 	    ECG:  	  RADIOLOGY:  OTHER: 	  	  LABS:	 	    CARDIAC MARKERS:  CARDIAC MARKERS ( 06 Apr 2023 11:28 )  x     / x     / 17 U/L / x     / x                                    9.2    9.97  )-----------( 278      ( 07 Apr 2023 06:14 )             27.9     04-07    127<L>  |  87<L>  |  102<H>  ----------------------------<  112<H>  5.1   |  25  |  7.01<H>    Ca    8.0<L>      07 Apr 2023 06:12  Phos  6.6     04-07  Mg     1.9     04-07    TPro  5.7<L>  /  Alb  x   /  TBili  x   /  DBili  x   /  AST  x   /  ALT  x   /  AlkPhos  x   04-06    proBNP:   Lipid Profile:   HgA1c:   TSH:     ·  Problem: BEULAH (acute kidney injury).   ·  Plan: Pt. with oliguric BEULAH suspected in setting of overdiuresis and volume depletion. Differential also includes AIN perhaps in setting of Cefazolin? On review of [facility] pt noted to have a SCr of 1 on admission which since 4/1 has progressively increased to 7 most recently. UA with blood (suspected from delgadillo catheter insertion) and also proteinuria of 1.0g (initial elevation likely from gross hematuria and over-estimated).   Pending JAGRUTI, P-ANCA, C-ANCA, Anti-GBM ab, HBsAg, Hep C antibody, HIV, Parvovirus, C3, C4, SPEP, serum immunofixation, free kappa lambda light chain, anti PLA2R.  Renal sonogram demonstrating a horseshoe kidney without evidence of hydronephrosis or renal calculi. Delgadillo catheter in place, with small amount of bloody urine. Pt euvolemic on examination.   Pt clinically volume overloaded; recommend another dose of IV bumex 2mg right now.  Pt no changed from cefazolin to Vancomycin  Thus far no acute indication for RRT however explained to patient that if continues to worsen then he may need; HD consent obtained and placed in the chart.   Monitor labs and urine output. Avoid nephrotoxins. Dose medications as per eGFR.      Vancomycin Level, Random (04.07.23 @ 06:14)    Vancomycin Level, Random: 7.9: The "VANCOMYCIN, RANDOM" test should only be ordered for patients with  severe renal dysfunction or on dialysis. Therapeutic ranges have not been  established and results must be interpreted in the context of patient's  clinical condition.  NOTE: Therapeutic range for Trough Vancomycin is 10-20 mcg/mL. ug/mL    < from: CT Abdomen and Pelvis No Cont (04.04.23 @ 22:40) >  Slight interval increase in proximal periureteral inflammatory fat   stranding bilaterally without perinephric abscess. Bilateral   nonobstructing calculi as detailed above. No hydronephrosis.    Assessment and plan  ---------------------------  77y m pmh BPH, Chronic venous insufficiency, HLD Hypothyroidism, S/P CABG x 2, S/P mitral valve replacement, SP Status post angioplasty, Sp TKR rt, Anasarca Pt comes to ed c/o shortness of breath for past month w progressive worsening of anasarca w swelling of scrotum and diff urinating, Has been noncompliant on diuretics for past two weeks w 20lb weight gain over past month. No fever and chills, sputum, nvdc, cp. PE Adult male lying stretcher w maked swelling to josh lower extr, w pitting edema,  chest  scant josh crackles w slight wheeze and prolonged exp phase  chf acute on chronic sec to RV dysfunction  tele, AFib hr controlled  will adjust cardiac meds  +BC i bottle, MSSA , s/p ppm explant  abx as per ID  s/p PPM and LEAD extraction  oob too chair  PICC line needs 6 weeks of abx  bladder scan was negative  acute on chronic renal failure/ renal appreciated  dc aldactone and Bumex  gentle hydration, repeat lytes in the afternoon, plan discussed with ACP  acute renal failure/ hematuria/ac was held awaiting renal eval/urology called awaiting ct abdomen and pelvis has ordered since last night awaiting results  gentle hydration  ID follow up regarding abx on vanco  ac held sec to hematuria, may re start on ac as clear by urology  beta blocker sed to rate control and hx of cad, s/p CABG  no further hematuria, start iv heparin low dose observe closely  worsening renal failure may need HD

## 2023-04-07 NOTE — PROGRESS NOTE ADULT - SUBJECTIVE AND OBJECTIVE BOX
[facility] DIVISION OF KIDNEY DISEASES AND HYPERTENSION -- FOLLOW UP NOTE  --------------------------------------------------------------------------------  77 year old male with PMH of Afib, CAD s/p CABG, BPH, HLD, HTN, and hypothyroidism who presented to the hospital on 3/25 with complaints of shortness of breath and wiley gain. The patient had been nonadherent with his diuretic regimen as outpatient and presented with acute on chronic decompensated heart failure. The patient was initiated on IV diuresis with significant improvement in volume status with approximately 15lb weight loss. Hospital course was further complicated by MSSA bacteremia requiring pacemaker extraction with micra implant and currently being managed with IVC abx with PICC line in place. The nephrology team was consulted for oliguric BEULAH. [facility] noted to have a SCr of 1 on admission which since 4/1 has progressively increased to 7.02 most recently.    24 hour events/subjective:  Pt. seen and examined this morning. Reports feeling okay but decreased appetite.  Pt remains oliguric at this time.       PAST HISTORY  --------------------------------------------------------------------------------  No significant changes to PMH, PSH, FHx, SHx, unless otherwise noted    ALLERGIES & MEDICATIONS  --------------------------------------------------------------------------------  Allergies    alfuzosin (Hives)  levofloxacin (Hives)  Myrbetriq (Hives)  tamsulosin (Hives)    Intolerances      Standing Inpatient Medications  aspirin  chewable 81 milliGRAM(s) Oral daily  atorvastatin 40 milliGRAM(s) Oral at bedtime  chlorhexidine 4% Liquid 1 Application(s) Topical <User Schedule>  levothyroxine 137 MICROGram(s) Oral daily  metoprolol succinate  milliGRAM(s) Oral daily  polyethylene glycol 3350 17 Gram(s) Oral daily  senna 2 Tablet(s) Oral at bedtime  sodium zirconium cyclosilicate 10 Gram(s) Oral daily    PRN Inpatient Medications  acetaminophen     Tablet .. 650 milliGRAM(s) Oral every 6 hours PRN  bisacodyl Suppository 10 milliGRAM(s) Rectal daily PRN      REVIEW OF SYSTEMS    All other systems were reviewed and are negative, except as noted.    VITALS/PHYSICAL EXAM  --------------------------------------------------------------------------------  T(C): 36.4 (04-07-23 @ 04:41), Max: 37 (04-06-23 @ 20:58)  HR: 76 (04-07-23 @ 04:41) (76 - 89)  BP: 106/67 (04-07-23 @ 04:41) (94/54 - 111/74)  RR: 18 (04-07-23 @ 04:41) (18 - 18)  SpO2: 91% (04-07-23 @ 04:41) (91% - 94%)  Wt(kg): --        04-06-23 @ 07:01  -  04-07-23 @ 07:00  --------------------------------------------------------  IN: 540 mL / OUT: 125 mL / NET: 415 mL        Physical Exam:  	Gen: NAD  	HEENT: MMM  	Pulm: CTA B/L  	CV: S1S2  	Abd: Soft, +BS   	Ext: ++ LE edema B/L  	Neuro: Awake  	Skin: Warm and dry    LABS/STUDIES  --------------------------------------------------------------------------------              9.2    9.97  >-----------<  278      [04-07-23 @ 06:14]              27.9     127  |  87  |  102  ----------------------------<  112      [04-07-23 @ 06:12]  5.1   |  25  |  7.01        Ca     8.0     [04-07-23 @ 06:12]      Mg     1.9     [04-07-23 @ 06:12]      Phos  6.6     [04-07-23 @ 06:12]    TPro  5.7  /  Alb  x   /  TBili  x   /  DBili  x   /  AST  x   /  ALT  x   /  AlkPhos  x   [04-06-23 @ 11:47]        CK 17      [04-06-23 @ 11:28]    Creatinine Trend:  SCr 7.01 [04-07 @ 06:12]  SCr 6.57 [04-06 @ 20:15]  SCr 6.21 [04-06 @ 11:28]  SCr 6.02 [04-06 @ 07:06]  SCr 5.49 [04-05 @ 22:41]    Urinalysis - [04-06-23 @ 09:02]      Color Red / Appearance Turbid / SG 1.027 / pH 7.0      Gluc Negative / Ketone Negative  / Bili Small / Urobili Negative       Blood Large / Protein 300 mg/dL / Leuk Est Small / Nitrite Positive      RBC >50 / WBC 5 / Hyaline 0 / Gran  / Sq Epi  / Non Sq Epi  / Bacteria Few    Urine Creatinine 66      [04-06-23 @ 19:21]  Urine Protein 63      [04-06-23 @ 19:21]  Urine Sodium 111      [04-05-23 @ 14:53]  Urine Potassium 29      [04-05-23 @ 14:53]  Urine Chloride 71      [04-05-23 @ 14:53]  Urine Osmolality 299      [04-05-23 @ 14:53]      HBsAg Nonreact      [04-06-23 @ 11:29]  HCV 0.15, Nonreact      [04-06-23 @ 11:46]  HIV Nonreact      [04-06-23 @ 11:32]

## 2023-04-07 NOTE — PROGRESS NOTE ADULT - ASSESSMENT
76 y/o male PMHx HTN, HLD, CAD s/p stent on ASA, A-fib, hypothyroid, CHF now presenting to the ED worsening SOB, DUNCAN, peripheral edema, scrotal edema and 20 pound unintentional weight gain over last month with MSSA bacteremia, HF     Problem/Plan - 1:  ·  Problem: MSSA bacteremia.   ·  Plan: -only 1 out of 4 positive - still suspect this is real   -off ancef due to concern for AIN  -repeat bcx negative  -likely source is toe from recent nail clipping   -ERENDIRA negative for vegetations  -s/p PPM extraction and micra placement   -EP input noted  -also with low grade fevers  -picc  -6 weeks iv abx - day 7 of 42 of abx  -potential side effects of abx explained including GI, Cdiff, allergy issues, development of resistance, etc.  -potential side effects of PICC explained including bleeding and infection  -weekly cbc, bun/creatinine - fax [phone]  -OR cx negative  -dose vanco by level for now; recommend Vancomycin 1.25g IV X1 today. Check random level tomorrow (ordered)     Problem/Plan - 2:  ·  Problem: Heart failure.   ·  Plan: -per cardio.    Louie Nunes M.D.  [facility] Division of Infectious Disease  8AM-5PM Monday - Friday: Available on [employer] Teams  After Hours and Holidays (or if no response on [employer] Teams): Please contact the Infectious Diseases Office at [phone]

## 2023-04-07 NOTE — PROGRESS NOTE ADULT - SUBJECTIVE AND OBJECTIVE BOX
Follow Up:      Interval History:    REVIEW OF SYSTEMS  [  ] ROS unobtainable because:    [  ] All other systems negative except as noted below    Constitutional:  [ ] fever [ ] chills  [ ] weight loss  [ ] weakness  Skin:  [ ] rash [ ] phlebitis	  Eyes: [ ] icterus [ ] pain  [ ] discharge	  ENMT: [ ] sore throat  [ ] thrush [ ] ulcers [ ] exudates  Respiratory: [ ] dyspnea [ ] hemoptysis [ ] cough [ ] sputum	  Cardiovascular:  [ ] chest pain [ ] palpitations [ ] edema	  Gastrointestinal:  [ ] nausea [ ] vomiting [ ] diarrhea [ ] constipation [ ] pain	  Genitourinary:  [ ] dysuria [ ] frequency [ ] hematuria [ ] discharge [ ] flank pain  [ ] incontinence  Musculoskeletal:  [ ] myalgias [ ] arthralgias [ ] arthritis  [ ] back pain  Neurological:  [ ] headache [ ] seizures  [ ] confusion/altered mental status    Allergies  alfuzosin (Hives)  levofloxacin (Hives)  Myrbetriq (Hives)  tamsulosin (Hives)        ANTIMICROBIALS:      OTHER MEDS:  MEDICATIONS  (STANDING):  acetaminophen     Tablet .. 650 every 6 hours PRN  aspirin  chewable 81 daily  atorvastatin 40 at bedtime  bisacodyl Suppository 10 daily PRN  heparin  Infusion 800 <Continuous>  levothyroxine 137 daily  metoprolol succinate  daily  polyethylene glycol 3350 17 daily  senna 2 at bedtime      Vital Signs Last 24 Hrs  T(C): 36.7 (2023 12:25), Max: 37 (2023 20:58)  T(F): 98 (2023 12:25), Max: 98.6 (2023 20:58)  HR: 72 (2023 12:25) (72 - 88)  BP: 113/65 (2023 12:25) (106/67 - 113/65)  BP(mean): --  RR: 18 (2023 12:25) (18 - 18)  SpO2: 96% (2023 12:25) (91% - 96%)    Parameters below as of 2023 12:25  Patient On (Oxygen Delivery Method): room air        PHYSICAL EXAMINATION:  General: Alert and Awake, NAD  HEENT: PERRL, EOMI  Neck: Supple  Cardiac: RRR, No M/R/G  Resp: CTAB, No Wh/Rh/Ra  Abdomen: NBS, NT/ND, No HSM, No rigidity or guarding  MSK: No LE edema. No Calf tenderness  : No delgadillo  Skin: No rashes or lesions. Skin is warm and dry to the touch.   Neuro: Alert and Awake. CN 2-12 Grossly intact. Moves all four extremities spontaneously.  Psych: Calm, Pleasant, Cooperative                          9.5    9.31  )-----------( 286      ( 2023 16:49 )             28.6       04-07    127<L>  |  86<L>  |  x   ----------------------------<  163<H>  4.9   |  24  |  7.57<H>    Ca    8.5      2023 16:49  Phos  6.6     04-07  Mg     1.9     -07    TPro  5.7<L>  /  Alb  x   /  TBili  x   /  DBili  x   /  AST  x   /  ALT  x   /  AlkPhos  x         Urinalysis Basic - ( 2023 09:02 )    Color: Red / Appearance: Turbid / S.027 / pH: x  Gluc: x / Ketone: Negative  / Bili: Small / Urobili: Negative   Blood: x / Protein: 300 mg/dL / Nitrite: Positive   Leuk Esterase: Small / RBC: >50 /hpf / WBC 5 /HPF   Sq Epi: x / Non Sq Epi: x / Bacteria: Few        MICROBIOLOGY:  Vancomycin Level, Random: 7.9 ug/mL (23 @ 06:14)  v  Clean Catch Clean Catch (Midstream)  23   No growth  --  --      .Other Other  23   Culture is being performed. Fungal cultures are held for 4 weeks.  --  --      .Blood Blood-Peripheral  23   No Growth Final  --  --      .Blood  23   Growth in anaerobic bottle: Staphylococcus aureus  ***Blood Panel PCR results on this specimen are available  approximately 3 hours after the Gram stain result.***  Gram stain, PCR, and/or culture results may not always  correspond due to difference in methodologies.  ************************************************************  This PCR assay was performed by multiplex PCR. This  Assay tests for 66 bacterial and resistance gene targets.  Please refer to the [facility] Labs test directory  at [email or web address] for details.  --  Blood Culture PCR  Staphylococcus aureus      .Blood  23   No Growth Final  --  --                RADIOLOGY:    <The imaging below has been reviewed and visualized by me independently. Findings as detailed in report below> Follow Up:  MSSA Bacteremia    Interval History: afebrile. no acute events.     REVIEW OF SYSTEMS  [  ] ROS unobtainable because:    [ x ] All other systems negative except as noted below    Constitutional:  [ ] fever [ ] chills  [ ] weight loss  [ ] weakness  Skin:  [ ] rash [ ] phlebitis	  Eyes: [ ] icterus [ ] pain  [ ] discharge	  ENMT: [ ] sore throat  [ ] thrush [ ] ulcers [ ] exudates  Respiratory: [ ] dyspnea [ ] hemoptysis [ ] cough [ ] sputum	  Cardiovascular:  [ ] chest pain [ ] palpitations [ ] edema	  Gastrointestinal:  [ ] nausea [ ] vomiting [ ] diarrhea [ ] constipation [ ] pain	  Genitourinary:  [ ] dysuria [ ] frequency [ ] hematuria [ ] discharge [ ] flank pain  [ ] incontinence  Musculoskeletal:  [ ] myalgias [ ] arthralgias [ ] arthritis  [ ] back pain  Neurological:  [ ] headache [ ] seizures  [ ] confusion/altered mental status    Allergies  alfuzosin (Hives)  levofloxacin (Hives)  Myrbetriq (Hives)  tamsulosin (Hives)        ANTIMICROBIALS:      OTHER MEDS:  MEDICATIONS  (STANDING):  acetaminophen     Tablet .. 650 every 6 hours PRN  aspirin  chewable 81 daily  atorvastatin 40 at bedtime  bisacodyl Suppository 10 daily PRN  heparin  Infusion 800 <Continuous>  levothyroxine 137 daily  metoprolol succinate  daily  polyethylene glycol 3350 17 daily  senna 2 at bedtime      Vital Signs Last 24 Hrs  T(C): 36.7 (2023 12:25), Max: 37 (2023 20:58)  T(F): 98 (2023 12:25), Max: 98.6 (2023 20:58)  HR: 72 (2023 12:25) (72 - 88)  BP: 113/65 (2023 12:25) (106/67 - 113/65)  BP(mean): --  RR: 18 (2023 12:25) (18 - 18)  SpO2: 96% (2023 12:25) (91% - 96%)    Parameters below as of 2023 12:25  Patient On (Oxygen Delivery Method): room air      PHYSICAL EXAMINATION:  General: Alert and Awake, NAD  HEENT: Normocephalic / Atraumatic  Resp: No accessory muscles of respiration utilized  Abdomen: Not distended.  MSK: No LE edema.   Skin: No rashes or lesions.    Neuro: Alert and Awake. CN 2-12 Grossly intact. Moves all four extremities spontaneously.  Psych: Calm, Pleasant, Cooperative                        9.5    9.31  )-----------( 286      ( 2023 16:49 )             28.6       04-07    127<L>  |  86<L>  |  x   ----------------------------<  163<H>  4.9   |  24  |  7.57<H>    Ca    8.5      2023 16:49  Phos  6.6     04-07  Mg     1.9     -07    TPro  5.7<L>  /  Alb  x   /  TBili  x   /  DBili  x   /  AST  x   /  ALT  x   /  AlkPhos  x   -      Urinalysis Basic - ( 2023 09:02 )    Color: Red / Appearance: Turbid / S.027 / pH: x  Gluc: x / Ketone: Negative  / Bili: Small / Urobili: Negative   Blood: x / Protein: 300 mg/dL / Nitrite: Positive   Leuk Esterase: Small / RBC: >50 /hpf / WBC 5 /HPF   Sq Epi: x / Non Sq Epi: x / Bacteria: Few        MICROBIOLOGY:  Vancomycin Level, Random: 7.9 ug/mL (23 @ 06:14)  v  Clean Catch Clean Catch (Midstream)  23   No growth  --  --      .Other Other  23   Culture is being performed. Fungal cultures are held for 4 weeks.  --  --      .Blood Blood-Peripheral  23   No Growth Final  --  --      .Blood  23   Growth in anaerobic bottle: Staphylococcus aureus  ***Blood Panel PCR results on this specimen are available  approximately 3 hours after the Gram stain result.***  Gram stain, PCR, and/or culture results may not always  correspond due to difference in methodologies.  ************************************************************  This PCR assay was performed by multiplex PCR. This  Assay tests for 66 bacterial and resistance gene targets.  Please refer to the [facility] Labs test directory  at [email or web address] for details.  --  Blood Culture PCR  Staphylococcus aureus      .Blood  23   No Growth Final  --  --                RADIOLOGY:    <The imaging below has been reviewed and visualized by me independently. Findings as detailed in report below>    < from: [facility] Duplex Upper Ext Vein Scan, Right (23 @ 13:34) >  IMPRESSION:  No evidence of right upper extremity deep venous thrombosis.    < end of copied text >

## 2023-04-07 NOTE — PROGRESS NOTE ADULT - ASSESSMENT
77  year old male    h/o  CAD s/p stent , asa.      A-fib/ PPM, , hypothyroid, and total left knee replacement    prior  PPM  interrogation  with  [facility]/   s/p  brief   bursts  of  afib/ , on prior visit   Ct chest, retrosternal hematoma.  mod left pl effusion    was  on bumex/  aldactone        *    admitted with    wprsening   pedal  edema          component  of  cellulitis   and  from  c/c  diastolic  chf,/  h/o  l/edema  of L  leg          pt  admits to  being  non complaint with  meds        *   h/o    c/c AFIB,   has  PPM         on eliquis     *    Anemia, , hb stable, had  been seen by  gi  eval dr joann sanchez in past     *    CAD,     cath, with 2 vessel disease,  s/p  CABG and  MVR  surg d r marni   *      h/o  Afib on    eliquis          c/c leg redness/  4 +  edema, on keflex, has  improved    *     echo,  on 7/2022,  ef  35/ new/ severe  TR          cath,   was non  obstructive    *    on synthroid          on asa/  torpol/  eliquis          crt  noted,   from  lasix// aldactone.  leg  swelling  is  lessening   *   MSSA  bacterinia           was  on iv ancef.  ,  rpt bcx    are  negative          explant pf  ppm  and  Micra  placement on  3/30/23. dr sunitha rocha   *    Echo,  ef  25,  mod  AI.  severe  TR        PICC  line  and  extended  course  of  ab.         bumex./ aldactone stopped/        BEULAH,   seen  by  house renal      ?  AIN,  hnece  ab was  switched   to iv vanco,  per  ID     ? need  for  renal  bx,  defr  to  house  renal/   doppler arm,   no  dvt       crt is  7  today/  follow  vanco  level  and dose  accordingly  on  iv heparin/  Afib         card /  dr mikayla dick         rad< from: TTE with Doppler (w/Cont) (07.05.22 @ 09:42) >  Conclusions:  Endocardial visualization enhanced with intravenous  injection of Ultrasonic Enhancing Agent (Definity).  Severe left ventricular enlargement.  Estimated ejection fraction 35%. Diffuse hypokinesis, with  inferior akinesis.  Bioprosthetic mitral valve with normal function.  Right ventricular enlargement with decreased right  ventricular systolic function.  A device wire is noted in the right heart.  ------------------------------------------------------------------------  Confirmed on  7/5/2022 - 12:16:10 by Kristian    < end of copied text >

## 2023-04-08 LAB
ANA PAT FLD IF-IMP: ABNORMAL
ANA TITR SER: ABNORMAL
ANION GAP SERPL CALC-SCNC: 17 MMOL/L — SIGNIFICANT CHANGE UP (ref 5–17)
ANION GAP SERPL CALC-SCNC: 18 MMOL/L — HIGH (ref 5–17)
APTT BLD: 35.8 SEC — HIGH (ref 27.5–35.5)
APTT BLD: 37.3 SEC — HIGH (ref 27.5–35.5)
APTT BLD: 50.9 SEC — HIGH (ref 27.5–35.5)
B19V DNA FLD QL NAA+PROBE: SIGNIFICANT CHANGE UP IU/ML
BUN SERPL-MCNC: 110 MG/DL — HIGH (ref 7–23)
BUN SERPL-MCNC: 119 MG/DL — HIGH (ref 7–23)
CALCIUM SERPL-MCNC: 8.2 MG/DL — LOW (ref 8.4–10.5)
CALCIUM SERPL-MCNC: 8.2 MG/DL — LOW (ref 8.4–10.5)
CHLORIDE SERPL-SCNC: 86 MMOL/L — LOW (ref 96–108)
CHLORIDE SERPL-SCNC: 87 MMOL/L — LOW (ref 96–108)
CO2 SERPL-SCNC: 23 MMOL/L — SIGNIFICANT CHANGE UP (ref 22–31)
CO2 SERPL-SCNC: 24 MMOL/L — SIGNIFICANT CHANGE UP (ref 22–31)
CREAT SERPL-MCNC: 8.03 MG/DL — HIGH (ref 0.5–1.3)
CREAT SERPL-MCNC: 8.53 MG/DL — HIGH (ref 0.5–1.3)
EGFR: 6 ML/MIN/1.73M2 — LOW
EGFR: 6 ML/MIN/1.73M2 — LOW
GBM IGG SER-ACNC: <0.2 — SIGNIFICANT CHANGE UP (ref 0–0.9)
GLUCOSE SERPL-MCNC: 120 MG/DL — HIGH (ref 70–99)
GLUCOSE SERPL-MCNC: 147 MG/DL — HIGH (ref 70–99)
HCT VFR BLD CALC: 27 % — LOW (ref 39–50)
HCT VFR BLD CALC: 27.8 % — LOW (ref 39–50)
HGB BLD-MCNC: 9.2 G/DL — LOW (ref 13–17)
HGB BLD-MCNC: 9.4 G/DL — LOW (ref 13–17)
MAGNESIUM SERPL-MCNC: 1.9 MG/DL — SIGNIFICANT CHANGE UP (ref 1.6–2.6)
MCHC RBC-ENTMCNC: 29.5 PG — SIGNIFICANT CHANGE UP (ref 27–34)
MCHC RBC-ENTMCNC: 30 PG — SIGNIFICANT CHANGE UP (ref 27–34)
MCHC RBC-ENTMCNC: 33.8 GM/DL — SIGNIFICANT CHANGE UP (ref 32–36)
MCHC RBC-ENTMCNC: 34.1 GM/DL — SIGNIFICANT CHANGE UP (ref 32–36)
MCV RBC AUTO: 87.1 FL — SIGNIFICANT CHANGE UP (ref 80–100)
MCV RBC AUTO: 87.9 FL — SIGNIFICANT CHANGE UP (ref 80–100)
NRBC # BLD: 0 /100 WBCS — SIGNIFICANT CHANGE UP (ref 0–0)
NRBC # BLD: 0 /100 WBCS — SIGNIFICANT CHANGE UP (ref 0–0)
PHOSPHATE SERPL-MCNC: 7.3 MG/DL — HIGH (ref 2.5–4.5)
PLATELET # BLD AUTO: 272 K/UL — SIGNIFICANT CHANGE UP (ref 150–400)
PLATELET # BLD AUTO: 289 K/UL — SIGNIFICANT CHANGE UP (ref 150–400)
POTASSIUM SERPL-MCNC: 5.2 MMOL/L — SIGNIFICANT CHANGE UP (ref 3.5–5.3)
POTASSIUM SERPL-MCNC: 5.3 MMOL/L — SIGNIFICANT CHANGE UP (ref 3.5–5.3)
POTASSIUM SERPL-SCNC: 5.2 MMOL/L — SIGNIFICANT CHANGE UP (ref 3.5–5.3)
POTASSIUM SERPL-SCNC: 5.3 MMOL/L — SIGNIFICANT CHANGE UP (ref 3.5–5.3)
RBC # BLD: 3.07 M/UL — LOW (ref 4.2–5.8)
RBC # BLD: 3.19 M/UL — LOW (ref 4.2–5.8)
RBC # FLD: 14.1 % — SIGNIFICANT CHANGE UP (ref 10.3–14.5)
RBC # FLD: 14.3 % — SIGNIFICANT CHANGE UP (ref 10.3–14.5)
SODIUM SERPL-SCNC: 127 MMOL/L — LOW (ref 135–145)
SODIUM SERPL-SCNC: 128 MMOL/L — LOW (ref 135–145)
UFH PPP CHRO-ACNC: 0.63 IU/ML — SIGNIFICANT CHANGE UP (ref 0.3–0.7)
VANCOMYCIN FLD-MCNC: 22 UG/ML — SIGNIFICANT CHANGE UP
WBC # BLD: 10.61 K/UL — HIGH (ref 3.8–10.5)
WBC # BLD: 10.89 K/UL — HIGH (ref 3.8–10.5)
WBC # FLD AUTO: 10.61 K/UL — HIGH (ref 3.8–10.5)
WBC # FLD AUTO: 10.89 K/UL — HIGH (ref 3.8–10.5)

## 2023-04-08 PROCEDURE — 99233 SBSQ HOSP IP/OBS HIGH 50: CPT | Mod: GC

## 2023-04-08 RX ORDER — HEPARIN SODIUM 5000 [USP'U]/ML
1300 INJECTION INTRAVENOUS; SUBCUTANEOUS
Qty: 25000 | Refills: 0 | Status: DISCONTINUED | OUTPATIENT
Start: 2023-04-08 | End: 2023-04-08

## 2023-04-08 RX ORDER — HEPARIN SODIUM 5000 [USP'U]/ML
1400 INJECTION INTRAVENOUS; SUBCUTANEOUS
Qty: 25000 | Refills: 0 | Status: DISCONTINUED | OUTPATIENT
Start: 2023-04-08 | End: 2023-04-09

## 2023-04-08 RX ORDER — BUMETANIDE 0.25 MG/ML
2 INJECTION INTRAMUSCULAR; INTRAVENOUS
Refills: 0 | Status: DISCONTINUED | OUTPATIENT
Start: 2023-04-08 | End: 2023-04-18

## 2023-04-08 RX ADMIN — HEPARIN SODIUM 14 UNIT(S)/HR: 5000 INJECTION INTRAVENOUS; SUBCUTANEOUS at 18:40

## 2023-04-08 RX ADMIN — ATORVASTATIN CALCIUM 40 MILLIGRAM(S): 80 TABLET, FILM COATED ORAL at 21:46

## 2023-04-08 RX ADMIN — HEPARIN SODIUM 13 UNIT(S)/HR: 5000 INJECTION INTRAVENOUS; SUBCUTANEOUS at 11:53

## 2023-04-08 RX ADMIN — CHLORHEXIDINE GLUCONATE 1 APPLICATION(S): 213 SOLUTION TOPICAL at 05:24

## 2023-04-08 RX ADMIN — Medication 100 MILLIGRAM(S): at 05:24

## 2023-04-08 RX ADMIN — SENNA PLUS 2 TABLET(S): 8.6 TABLET ORAL at 21:47

## 2023-04-08 RX ADMIN — SODIUM ZIRCONIUM CYCLOSILICATE 10 GRAM(S): 10 POWDER, FOR SUSPENSION ORAL at 11:54

## 2023-04-08 RX ADMIN — Medication 81 MILLIGRAM(S): at 11:55

## 2023-04-08 RX ADMIN — Medication 137 MICROGRAM(S): at 05:24

## 2023-04-08 RX ADMIN — HEPARIN SODIUM 11 UNIT(S)/HR: 5000 INJECTION INTRAVENOUS; SUBCUTANEOUS at 02:01

## 2023-04-08 NOTE — PROGRESS NOTE ADULT - PROBLEM SELECTOR PLAN 1
Pt. with oliguric BEULAH. Differential also includes AIN perhaps in setting of Cefazolin? vs over-diuresos. On review of [facility] pt noted to have a SCr of 1 on admission which since 4/1 has progressively increased to 7 most recently. UA with blood (suspected from delgadillo catheter insertion) and also proteinuria of 1.0g (initial elevation likely from gross hematuria and over-estimated).   Pending JAGRUTI, P-ANCA, C-ANCA, Anti-GBM ab, HBsAg, Hep C antibody, HIV, Parvovirus, C3, C4, SPEP, serum immunofixation, free kappa lambda light chain, anti PLA2R.  Renal sonogram demonstrating a horseshoe kidney without evidence of hydronephrosis or renal calculi. Delgadillo catheter in place, with small amount of bloody urine. Pt euvolemic on examination.   Pt clinically volume overloaded; recommend another Bumex 2 mg IV BID for today.  Pt now changed from cefazolin to Vancomycin  Thus far no acute indication for RRT however explained to patient that if continues to worsen then he may need; HD consent obtained and placed in the chart.   Monitor labs and urine output. Avoid nephrotoxins. Dose medications as per eGFR.

## 2023-04-08 NOTE — PROGRESS NOTE ADULT - SUBJECTIVE AND OBJECTIVE BOX
[facility] Division of Kidney Diseases & Hypertension  FOLLOW UP NOTE  [phone]--------------------------------------------------------------------------------    HPI: 77 year old male with PMH of Afib, CAD s/p CABG, BPH, HLD, HTN, and hypothyroidism who presented to the hospital on 3/25 with complaints of shortness of breath and wiley gain. The patient had been nonadherent with his diuretic regimen as outpatient and presented with acute on chronic decompensated heart failure. The patient was initiated on IV diuresis with significant improvement in volume status with approximately 15lb weight loss. Hospital course was further complicated by MSSA bacteremia requiring pacemaker extraction with micra implant and currently being managed with IVC abx with PICC line in place. The nephrology team was consulted for oliguric BEULAH. On review of [facility] pt noted to have a SCr of 1 on admission which since 4/1 has progressively increased to 7.02 most recently.    24 hour events/subjective:  Pt. seen and examined this morning. Pt. reports BL LE edema and mild SOB. Otherwise feels well, requesting to go home. He denies any headaches, fevers/chillc, chest pain, and abdominal pain.      PAST HISTORY  --------------------------------------------------------------------------------  No significant changes to PMH, PSH, FHx, SHx, unless otherwise noted    ALLERGIES & MEDICATIONS  --------------------------------------------------------------------------------  Allergies    alfuzosin (Hives)  levofloxacin (Hives)  Myrbetriq (Hives)  tamsulosin (Hives)    Intolerances      Standing Inpatient Medications  aspirin  chewable 81 milliGRAM(s) Oral daily  atorvastatin 40 milliGRAM(s) Oral at bedtime  chlorhexidine 4% Liquid 1 Application(s) Topical <User Schedule>  heparin  Infusion 1300 Unit(s)/Hr IV Continuous <Continuous>  levothyroxine 137 MICROGram(s) Oral daily  metoprolol succinate  milliGRAM(s) Oral daily  polyethylene glycol 3350 17 Gram(s) Oral daily  senna 2 Tablet(s) Oral at bedtime  sodium zirconium cyclosilicate 10 Gram(s) Oral daily    PRN Inpatient Medications  acetaminophen     Tablet .. 650 milliGRAM(s) Oral every 6 hours PRN  bisacodyl Suppository 10 milliGRAM(s) Rectal daily PRN      REVIEW OF SYSTEMS  --------------------------------------------------------------------------------  See HPI    VITALS/PHYSICAL EXAM  --------------------------------------------------------------------------------  T(C): 36.9 (04-08-23 @ 11:58), Max: 36.9 (04-08-23 @ 05:02)  HR: 80 (04-08-23 @ 11:58) (77 - 87)  BP: 107/64 (04-08-23 @ 11:58) (104/59 - 115/72)  RR: 18 (04-08-23 @ 11:58) (18 - 18)  SpO2: 96% (04-08-23 @ 11:58) (95% - 97%)  Wt(kg): --      04-07-23 @ 07:01  -  04-08-23 @ 07:00  --------------------------------------------------------  IN: 566 mL / OUT: 150 mL / NET: 416 mL    04-08-23 @ 07:01  -  04-08-23 @ 14:34  --------------------------------------------------------  IN: 180 mL / OUT: 0 mL / NET: 180 mL      Physical Exam:  Gen: NAD  HEENT: MMM  Pulm: CTA B/L  CV: S1S2  Abd: Soft, +BS   Ext: ++ LE edema B/L  Neuro: Awake  Skin: Warm and dry    LABS/STUDIES  --------------------------------------------------------------------------------              9.2    10.61 >-----------<  272      [04-08-23 @ 07:00]              27.0     128  |  87  |  110  ----------------------------<  120      [04-08-23 @ 06:59]  5.2   |  23  |  8.03        Ca     8.2     [04-08-23 @ 06:59]      Mg     1.9     [04-08-23 @ 06:59]      Phos  7.3     [04-08-23 @ 06:59]    PTT: 37.3       [04-08-23 @ 08:27]    Creatinine Trend:  SCr 8.03 [04-08 @ 06:59]  SCr 7.57 [04-07 @ 16:49]  SCr 7.01 [04-07 @ 06:12]  SCr 6.57 [04-06 @ 20:15]  SCr 6.21 [04-06 @ 11:28]    Urinalysis - [04-06-23 @ 09:02]      Color Red / Appearance Turbid / SG 1.027 / pH 7.0      Gluc Negative / Ketone Negative  / Bili Small / Urobili Negative       Blood Large / Protein 300 mg/dL / Leuk Est Small / Nitrite Positive      RBC >50 / WBC 5 / Hyaline 0 / Gran  / Sq Epi  / Non Sq Epi  / Bacteria Few    Urine Creatinine 66      [04-06-23 @ 19:21]  Urine Protein 63      [04-06-23 @ 19:21]  Urine Sodium 111      [04-05-23 @ 14:53]  Urine Potassium 29      [04-05-23 @ 14:53]  Urine Chloride 71      [04-05-23 @ 14:53]  Urine Osmolality 299      [04-05-23 @ 14:53]    HBsAg Nonreact      [04-06-23 @ 11:29]  HCV 0.15, Nonreact      [04-06-23 @ 11:46]  HIV Nonreact      [04-06-23 @ 11:32]    C3 Complement 126      [04-06-23 @ 11:47]  C4 Complement 25      [04-06-23 @ 11:47]  anti-GBM <0.2      [04-06-23 @ 11:46]

## 2023-04-08 NOTE — PROGRESS NOTE ADULT - SUBJECTIVE AND OBJECTIVE BOX
{\rtf1\adqkkv47430\ansi\znmhbcz2157\ftnbj\uc1\deff0  {\fonttbl{\f0 \fnil Segoe UI;}{\f1 \fnil \fcharset0 Segoe UI;}{\f2 \fnil Times [employer] [place];}}  {\colortbl ;\rug743\eagvx322\gpre100 ;\red0\green0\blue0 ;\red0\green0\zzwf429 ;\red0\green0\blue0 ;}  {\stylesheet{\f0\fs20 Normal;}{\cs1 Default Paragraph Font;}{\cs2\f0\fs16 Line Number;}{\cs3\f2\fs24\ul\cf3 Hyperlink;}}  {\*\revtbl{Unknown;}}  \wcfoiv98003\pufats56518\jiktg3659\pkvzs7660\gmhhe9725\aaixe3867\lntmpmz878\xpvfskd584\nogrowautofit\seakih785\formshade\nofeaturethrottle1\dntblnsbdb\fet4\aendnotes\aftnnrlc\pgbrdrhead\pgbrdrfoot  \sectd\yonqsl32405\orgdzj24492\guttersxn0\pdhciseb6613\cvlfyqta6541\zogjpopb5647\xpzuzqcy9492\dydxahk865\fnmhrcg819\sbkpage\pgncont\pgndec  \plain\plain\f0\fs24\pard\plain\f0\fs24\plain\f0\fs20\xliw9428\hich\f0\dbch\f0\loch\f0\fs20 Date of Service: 04-08-23 @ 10:16\par           CARDIOLOGY     PROGRESS  NOTE \par  ________________________________________________\par  \par  CHIEF COMPLAINT:Patient is a 77y old  Male who presents with a chief complaint of Chart Reviewed, Events noted\par  " 77  year old male h/o  CAD s/p stent , asa. A-fib/ PPM, , hypothyroid, and total left knee replacement  prior  PPM  interrogation  with  [facility]/   s/p  brief   bursts  of  afib/ , on prior visit. Ct chest, retrosternal hematoma.  mod left pl effusion was    on bumex/  aldactone admitted with  worsening   pedal  edema  component  of  cellulitis   and  from  c/c  diastolic  chf,/  h/o  l/edema  of L  leg pt  admits to  being  non complaint with  meds" \par  no complain\par  \par  \tab\par  REVIEW OF SYSTEMS:\par  CONSTITUTIONAL: No fever, weight loss, or fatigue\par  EYES: No eye pain, visual disturbances, or discharge\par  ENT:  No difficulty hearing, tinnitus, vertigo; No sinus or throat pain\par  NECK: No pain or stiffness\par  RESPIRATORY: No cough, wheezing, chills or hemoptysis; No Shortness of Breath\par  CARDIOVASCULAR: No chest pain, palpitations, passing out, dizziness, or leg swelling\par  GASTROINTESTINAL: No abdominal or epigastric pain. No nausea, vomiting, or hematemesis; No diarrhea or constipation. No melena or hematochezia.\par  GENITOURINARY: No dysuria, frequency, hematuria, or incontinence\par  NEUROLOGICAL: No headaches, memory loss, loss of strength, numbness, or tremors\par  SKIN: No itching, burning, rashes, or lesions \par  LYMPH Nodes: No enlarged glands\par  ENDOCRINE: No heat or cold intolerance; No hair loss\par  MUSCULOSKELETAL: No joint pain or swelling; No muscle, back, or extremity pain\par  PSYCHIATRIC: No depression, anxiety, mood swings, or difficulty sleeping\par  HEME/LYMPH: No easy bruising, or bleeding gums\par  ALLERGY AND IMMUNOLOGIC: No hives or eczema\tab\par  \par  [x ] All others negative\tab\par  [ ] Unable to obtain\par  \par  PHYSICAL EXAM:\par  T(C): 36.9 (04-08-23 @ 05:02), Max: 36.9 (04-08-23 @ 05:02)\par  HR: 87 (04-08-23 @ 05:02) (72 - 87)\par  BP: 104/59 (04-08-23 @ 05:02) (104/59 - 115/72)\par  RR: 18 (04-08-23 @ 05:02) (18 - 18)\par  SpO2: 95% (04-08-23 @ 05:02) (95% - 97%)\par  Wt(kg): --\par  I&O's Summary\par  \par  07 Apr 2023 07:01  -  08 Apr 2023 07:00\par  --------------------------------------------------------\par  IN: 566 mL / OUT: 150 mL / NET: 416 mL\par  \par  \par  \par  Appearance: Normal\tab\par  HEENT:   Normal oral mucosa, PERRL, EOMI\tab\par  Lymphatic: No lymphadenopathy\par  Cardiovascular: Normal S1 S2, No JVD, + murmurs, No edema\par  Respiratory:  + rhonchi\par  Psychiatry: A & O x 3, Mood & affect appropriate\par  Gastrointestinal:  Soft, Non-tender, + BS\tab\par  Skin: No rashes, No ecchymoses, No cyanosis\tab\par  Extremities: Normal range of motion, No clubbing, cyanosis or edema\par  Vascular: + pvd\par  \par  MEDICATIONS  (STANDING):\par  aspirin  chewable 81 milliGRAM(s) Oral daily\par  atorvastatin 40 milliGRAM(s) Oral at bedtime\par  chlorhexidine 4% Liquid 1 Application(s) Topical <User Schedule>\par  heparin  Infusion 900 Unit(s)/Hr (11 mL/Hr) IV Continuous <Continuous>\par  levothyroxine 137 MICROGram(s) Oral daily\par  metoprolol succinate  milliGRAM(s) Oral daily\par  polyethylene glycol 3350 17 Gram(s) Oral daily\par  senna 2 Tablet(s) Oral at bedtime\par  sodium zirconium cyclosilicate 10 Gram(s) Oral daily\par  \par  \par  TELEMETRY: \tab   \par  ECG:  \tab\par  RADIOLOGY:\par  OTHER: \tab\par  \tab\par  LABS:\tab  \tab\par  \par  CARDIAC MARKERS:\par  CARDIAC MARKERS ( 06 Apr 2023 11:28 )\par  x     / x     / 17 U/L / x     / x    \par  \par  \par  \par  \par           \par             9.2  \par  10.61 )-----------( 272      ( 08 Apr 2023 07:00 )\par             27.0 \par  \par  04-08\par  \par  128<L>  |  87<L>  |  110<H>\par  ----------------------------<  120<H>\par  5.2   |  23  |  8.03<H>\par  \par  Ca    8.2<L>      08 Apr 2023 06:59\par  Phos  7.3     04-08\par  Mg     1.9     04-08\par  \par  TPro  5.7<L>  /  Alb  x   /  TBili  x   /  DBili  x   /  AST  x   /  ALT  x   /  AlkPhos  x   04-06\par  \par  proBNP: \par  Lipid Profile: \par  HgA1c: \par  TSH: \par  PTT - ( 08 Apr 2023 08:27 )  PTT:37.3 sec\par  \par  \par  \ql\plain\f0\fs24{\*\bkmkstart [id number]}{\*\bkmkend [id number]}\plain\f1\fs16\lgrq3931\hich\f1\dbch\f1\loch\f1\cf2\fs16 mssa bacteremia, ppm explant\par  # [facility]\par  atn vs ain vs other\par  bun.cr uptrending, lytes stable, volume status stable\par  proteinuria/hematuria; recheck prot/cr now prot/cr 1.0 \par  serologic GN workup neg, check c3,c4\par  FLUSH delgadillo given hematuria- check bladder scan to make sure no retention\par  abx changed to vanco; dose by level \par  monitor UO\par  d/w pt in detail; no urgent indication for dialysis but it may reach a point where he may need it; \par  # volume overload\par  Echo,  ef  25,  mod  AI.  severe  TR\par  bumex./ aldactone stopped\par  + LE edema - can give  bumex 2mg iv today\par  #hyperkalemia- k stable; monitor trend; Lokelma daily\par  #hyponatremia; monitor trend; check sosm, u osm. \plain\f1\fs16\sxpc4120\hich\f1\dbch\f1\loch\f1\cf2\fs16\strike\pard\plain\f0\fs24\plain\f1\fs16\fgwc0816\hich\f1\dbch\f1\loch\f1\cf2\fs16\strike\plain\f1\fs16\ivaw9885\hich\f1\dbch\f1\loch\f1\cf2\fs16\plain\f0\fs20\kaur3920\hich\f0\dbch\f0\loch\f0\fs20\par  Vancomycin Level, Random (04.08.23 @ 07:00)\par    Vancomycin Level, Random: 22.0: The "VANCOMYCIN, RANDOM" test should only be ordered for patients with\par  severe renal dysfunction or on dialysis. Therapeutic ranges have not been\par  established and results must be interpreted in the context of patient's\par  clinical condition.\par  NOTE: Therapeutic range for Trough Vancomycin is 10-20 mcg/mL. ug/mL\par  \par  \par  Assessment and plan\par  ---------------------------\par  \ql\plain\f0\fs24\plain\f1\fs16\pyvl7463\hich\f1\dbch\f1\loch\f1\cf2\fs16 77y m pmh BPH, Chronic venous insufficiency, HLD Hypothyroidism, S/P CABG x 2, S/P mitral valve replacement, SP Status post angioplasty, Sp TKR rt, Anasarca Pt comes to ed c/o shortness   of breath for past month w progressive worsening of anasarca w swelling of scrotum and diff urinating, Has been noncompliant on diuretics for past two weeks w 20lb weight gain over past month. No fever and chills, sputum, nvdc, cp. PE Adult male lying   stretcher w maked swelling to josh lower extr, w pitting edema,  chest  scant josh crackles w slight wheeze and prolonged exp phase\par  chf acute on chronic sec to RV dysfunction\par  tele, AFib hr controlled\par  will adjust cardiac meds\par  +BC i bottle, MSSA , s/p ppm explant\par  abx as per ID\par  s/p PPM and LEAD extraction\par  oob too chair\par  PICC line needs 6 weeks of abx\par  bladder scan was negative\par  acute on chronic renal failure/ renal appreciated\par  dc aldactone and Bumex\par  gentle hydration, repeat lytes in the afternoon, plan discussed with ACP\par  acute renal failure/ hematuria/ac was held awaiting renal eval/urology called awaiting ct abdomen and pelvis has ordered since last night awaiting results\par  gentle hydration\par  ID follow up regarding abx on vanco\par  ac held sec to hematuria, may re start on ac as clear by urology\par  beta blocker sed to rate control and hx of cad, s/p CABG\par  no further hematuria, start iv heparin low dose observe closely so far so good\par  fu cbc, fu vanco level, ID is following up\par  worsening renal failure need HD, awaiting renal recommendation today\par  \pard\plain\f0\fs24\plain\f0\fs20\ghgy8673\hich\f0\dbch\f0\loch\f0\fs20\par  \par  \tab\par  \par  \par  \par  \ql\plain\f0\fs24\plain\f0\fs20\lkss0051\hich\f0\dbch\f0\loch\f0\fs20\par  }

## 2023-04-08 NOTE — PROGRESS NOTE ADULT - ATTENDING COMMENTS
The nephrology team was consulted for oliguric BEULAH. On review of [facility] pt noted to have a SCr of 1 on admission which since 4/1 has progressively increased to 7.02 most recently.  +SOB, + fatigue.  Emphasized severity of renal dysfunction and possible HD requirement acutely  1.  ARF--w/u in progress, borderline for HD indication although not overtly uremic and without tremor, asterixis.  Likely multifactorial ATN  2.  Volume overload--judicious diuresis and may benefit from UF.  Trend BNP  3.  Hyperkalemia--loop (not K sparing diuretic), Lokelma    discussed with med team  Bg Valerio MD  contact me on TEAMS

## 2023-04-08 NOTE — PROGRESS NOTE ADULT - ASSESSMENT
77  year old male    h/o  CAD s/p stent , asa.      A-fib/ PPM, , hypothyroid, and total left knee replacement    prior  PPM  interrogation  with  [facility]/   s/p  brief   bursts  of  afib/ , on prior visit   Ct chest, retrosternal hematoma.  mod left pl effusion    was  on bumex/  aldactone        *    admitted with    wprsening   pedal  edema          component  of  cellulitis   and  from  c/c  diastolic  chf,/  h/o  l/edema  of L  leg          pt  admits to  being  non complaint with  meds        *   h/o    c/c AFIB,   has  PPM         on eliquis     *    Anemia, , hb stable, had  been seen by  gi  eval dr joann sanchez in past     *    CAD,     cath, with 2 vessel disease,  s/p  CABG and  MVR  surg d r marni   *      h/o  Afib on    eliquis          c/c leg redness/  4 +  edema, on keflex, has  improved    *     echo,  on 7/2022,  ef  35/ new/ severe  TR          cath,   was non  obstructive    *    on synthroid          on asa/  torpol/  eliquis          crt  noted,   from  lasix// aldactone.  leg  swelling  is  lessening   *   MSSA  bacterinia           was  on iv ancef.  ,  rpt bcx    are  negative          explant pf  ppm  and  Micra  placement on  3/30/23. dr sunitha rocha   *    Echo,  ef  25,  mod  AI.  severe  TR        PICC  line  and  extended  course  of  ab.         bumex./ aldactone stopped/        BEULAH,   seen  by  [facility]      ?  AIN,  hnece  ab was  switched   to iv vanco,  per  ID     ? need  for  renal  bx,  defer   to  [place]  renal/   doppler arm,   no  dvt         follow  vanco  level  and dose  accordingly      on  iv heparin/  Afib      crt  8.  renal  to  f/p.  ha s no cp/sob         card /  dr mikayla dick         rad< from: TTE with Doppler (w/Cont) (07.05.22 @ 09:42) >  Conclusions:  Endocardial visualization enhanced with intravenous  injection of Ultrasonic Enhancing Agent (Definity).  Severe left ventricular enlargement.  Estimated ejection fraction 35%. Diffuse hypokinesis, with  inferior akinesis.  Bioprosthetic mitral valve with normal function.  Right ventricular enlargement with decreased right  ventricular systolic function.  A device wire is noted in the right heart.  ------------------------------------------------------------------------  Confirmed on  7/5/2022 - 12:16:10 by Kristian    < end of copied text >

## 2023-04-08 NOTE — PROGRESS NOTE ADULT - SUBJECTIVE AND OBJECTIVE BOX
afberil  REVIEW OF SYSTEMS:  CONSTITUTIONAL: No fever,  no  weight loss  ENT:  No  tinnitus,   no   vertigo  NECK: No pain or stiffness  RESPIRATORY: No cough, wheezing, chills or hemoptysis;    No Shortness of Breath  CARDIOVASCULAR: No chest pain, palpitations, dizziness  GASTROINTESTINAL: No abdominal or epigastric pain. No nausea, vomiting, or hematemesis; No diarrhea  No melena or hematochezia.  GENITOURINARY: No dysuria, frequency, hematuria, or incontinence  NEUROLOGICAL: No headaches  SKIN: No itching,  no   rash  LYMPH Nodes: No enlarged glands  ENDOCRINE: No heat or cold intolerance  MUSCULOSKELETAL: No joint pain or swelling  PSYCHIATRIC: No depression, anxiety  HEME/LYMPH: No easy bruising, or bleeding gums  ALLERGY AND IMMUNOLOGIC: No hives or eczema	    MEDICATIONS  (STANDING):  aspirin  chewable 81 milliGRAM(s) Oral daily  atorvastatin 40 milliGRAM(s) Oral at bedtime  chlorhexidine 4% Liquid 1 Application(s) Topical <User Schedule>  heparin  Infusion 900 Unit(s)/Hr (11 mL/Hr) IV Continuous <Continuous>  levothyroxine 137 MICROGram(s) Oral daily  metoprolol succinate  milliGRAM(s) Oral daily  polyethylene glycol 3350 17 Gram(s) Oral daily  senna 2 Tablet(s) Oral at bedtime  sodium zirconium cyclosilicate 10 Gram(s) Oral daily    MEDICATIONS  (PRN):  acetaminophen     Tablet .. 650 milliGRAM(s) Oral every 6 hours PRN Mild Pain (1 - 3)  bisacodyl Suppository 10 milliGRAM(s) Rectal daily PRN Constipation      Vital Signs Last 24 Hrs  T(C): 36.9 (08 Apr 2023 05:02), Max: 36.9 (08 Apr 2023 05:02)  T(F): 98.5 (08 Apr 2023 05:02), Max: 98.5 (08 Apr 2023 05:02)  HR: 87 (08 Apr 2023 05:02) (72 - 87)  BP: 104/59 (08 Apr 2023 05:02) (104/59 - 115/72)  BP(mean): --  RR: 18 (08 Apr 2023 05:02) (18 - 18)  SpO2: 95% (08 Apr 2023 05:02) (95% - 97%)    Parameters below as of 08 Apr 2023 05:02  Patient On (Oxygen Delivery Method): room air      CAPILLARY BLOOD GLUCOSE        I&O's Summary    07 Apr 2023 07:01  -  08 Apr 2023 07:00  --------------------------------------------------------  IN: 566 mL / OUT: 150 mL / NET: 416 mL          Appearance: Normal	  HEENT:   Normal oral mucosa, PERRL, EOMI	  Lymphatic: No lymphadenopathy  Cardiovascular: Normal S1 S2, No JVD  Respiratory: Lungs clear to auscultation	  Psychiatry: A & O x 3, Mood & affect appropriate  Gastrointestinal:  Soft, Non-tender, + BS	  Skin: No rash, No ecchymoses	  Extremities: Normal range of motion  Vascular: Peripheral pulses palpable bilaterally    LABS:                        9.2    10.61 )-----------( 272      ( 08 Apr 2023 07:00 )             27.0     04-08    128<L>  |  87<L>  |  110<H>  ----------------------------<  120<H>  5.2   |  23  |  8.03<H>    Ca    8.2<L>      08 Apr 2023 06:59  Phos  7.3     04-08  Mg     1.9     04-08    TPro  5.7<L>  /  Alb  x   /  TBili  x   /  DBili  x   /  AST  x   /  ALT  x   /  AlkPhos  x   04-06    PTT - ( 08 Apr 2023 08:27 )  PTT:37.3 sec  CARDIAC MARKERS ( 06 Apr 2023 11:28 )  x     / x     / 17 U/L / x     / x                            Consultant(s) Notes Reviewed:      Care Discussed with Consultants/Other Providers:

## 2023-04-09 LAB
ANION GAP SERPL CALC-SCNC: 17 MMOL/L — SIGNIFICANT CHANGE UP (ref 5–17)
ANION GAP SERPL CALC-SCNC: 19 MMOL/L — HIGH (ref 5–17)
APTT BLD: 44.9 SEC — HIGH (ref 27.5–35.5)
APTT BLD: 45.3 SEC — HIGH (ref 27.5–35.5)
APTT BLD: 47.8 SEC — HIGH (ref 27.5–35.5)
AUTO DIFF PNL BLD: NEGATIVE — SIGNIFICANT CHANGE UP
BUN SERPL-MCNC: 123 MG/DL — HIGH (ref 7–23)
BUN SERPL-MCNC: 124 MG/DL — HIGH (ref 7–23)
C-ANCA SER-ACNC: NEGATIVE — SIGNIFICANT CHANGE UP
CALCIUM SERPL-MCNC: 8.2 MG/DL — LOW (ref 8.4–10.5)
CALCIUM SERPL-MCNC: 8.3 MG/DL — LOW (ref 8.4–10.5)
CHLORIDE SERPL-SCNC: 85 MMOL/L — LOW (ref 96–108)
CHLORIDE SERPL-SCNC: 86 MMOL/L — LOW (ref 96–108)
CO2 SERPL-SCNC: 23 MMOL/L — SIGNIFICANT CHANGE UP (ref 22–31)
CO2 SERPL-SCNC: 24 MMOL/L — SIGNIFICANT CHANGE UP (ref 22–31)
CREAT SERPL-MCNC: 9.05 MG/DL — HIGH (ref 0.5–1.3)
CREAT SERPL-MCNC: 9.32 MG/DL — HIGH (ref 0.5–1.3)
EGFR: 5 ML/MIN/1.73M2 — LOW
EGFR: 6 ML/MIN/1.73M2 — LOW
GLUCOSE SERPL-MCNC: 111 MG/DL — HIGH (ref 70–99)
GLUCOSE SERPL-MCNC: 139 MG/DL — HIGH (ref 70–99)
MAGNESIUM SERPL-MCNC: 2 MG/DL — SIGNIFICANT CHANGE UP (ref 1.6–2.6)
P-ANCA SER-ACNC: NEGATIVE — SIGNIFICANT CHANGE UP
PHOSPHATE SERPL-MCNC: 8.6 MG/DL — HIGH (ref 2.5–4.5)
POTASSIUM SERPL-MCNC: 5.4 MMOL/L — HIGH (ref 3.5–5.3)
POTASSIUM SERPL-MCNC: 5.5 MMOL/L — HIGH (ref 3.5–5.3)
POTASSIUM SERPL-SCNC: 5.4 MMOL/L — HIGH (ref 3.5–5.3)
POTASSIUM SERPL-SCNC: 5.5 MMOL/L — HIGH (ref 3.5–5.3)
SODIUM SERPL-SCNC: 127 MMOL/L — LOW (ref 135–145)
SODIUM SERPL-SCNC: 127 MMOL/L — LOW (ref 135–145)
VANCOMYCIN FLD-MCNC: 18.7 UG/ML — SIGNIFICANT CHANGE UP
VANCOMYCIN FLD-MCNC: 19.2 UG/ML — SIGNIFICANT CHANGE UP

## 2023-04-09 PROCEDURE — 99233 SBSQ HOSP IP/OBS HIGH 50: CPT | Mod: GC

## 2023-04-09 RX ORDER — HEPARIN SODIUM 5000 [USP'U]/ML
1550 INJECTION INTRAVENOUS; SUBCUTANEOUS
Qty: 25000 | Refills: 0 | Status: DISCONTINUED | OUTPATIENT
Start: 2023-04-09 | End: 2023-04-10

## 2023-04-09 RX ORDER — SODIUM ZIRCONIUM CYCLOSILICATE 10 G/10G
10 POWDER, FOR SUSPENSION ORAL
Refills: 0 | Status: DISCONTINUED | OUTPATIENT
Start: 2023-04-09 | End: 2023-04-11

## 2023-04-09 RX ADMIN — SENNA PLUS 2 TABLET(S): 8.6 TABLET ORAL at 22:00

## 2023-04-09 RX ADMIN — Medication 137 MICROGRAM(S): at 05:32

## 2023-04-09 RX ADMIN — Medication 650 MILLIGRAM(S): at 19:39

## 2023-04-09 RX ADMIN — HEPARIN SODIUM 14.5 UNIT(S)/HR: 5000 INJECTION INTRAVENOUS; SUBCUTANEOUS at 05:44

## 2023-04-09 RX ADMIN — Medication 81 MILLIGRAM(S): at 11:24

## 2023-04-09 RX ADMIN — SODIUM ZIRCONIUM CYCLOSILICATE 10 GRAM(S): 10 POWDER, FOR SUSPENSION ORAL at 11:21

## 2023-04-09 RX ADMIN — BUMETANIDE 2 MILLIGRAM(S): 0.25 INJECTION INTRAMUSCULAR; INTRAVENOUS at 05:32

## 2023-04-09 RX ADMIN — Medication 100 MILLIGRAM(S): at 05:31

## 2023-04-09 RX ADMIN — HEPARIN SODIUM 14.5 UNIT(S)/HR: 5000 INJECTION INTRAVENOUS; SUBCUTANEOUS at 01:22

## 2023-04-09 RX ADMIN — POLYETHYLENE GLYCOL 3350 17 GRAM(S): 17 POWDER, FOR SOLUTION ORAL at 22:01

## 2023-04-09 RX ADMIN — HEPARIN SODIUM 15.5 UNIT(S)/HR: 5000 INJECTION INTRAVENOUS; SUBCUTANEOUS at 16:37

## 2023-04-09 RX ADMIN — HEPARIN SODIUM 14.5 UNIT(S)/HR: 5000 INJECTION INTRAVENOUS; SUBCUTANEOUS at 11:19

## 2023-04-09 RX ADMIN — CHLORHEXIDINE GLUCONATE 1 APPLICATION(S): 213 SOLUTION TOPICAL at 05:32

## 2023-04-09 RX ADMIN — SODIUM ZIRCONIUM CYCLOSILICATE 10 GRAM(S): 10 POWDER, FOR SUSPENSION ORAL at 19:44

## 2023-04-09 RX ADMIN — Medication 650 MILLIGRAM(S): at 20:50

## 2023-04-09 RX ADMIN — ATORVASTATIN CALCIUM 40 MILLIGRAM(S): 80 TABLET, FILM COATED ORAL at 22:00

## 2023-04-09 RX ADMIN — BUMETANIDE 2 MILLIGRAM(S): 0.25 INJECTION INTRAMUSCULAR; INTRAVENOUS at 15:02

## 2023-04-09 NOTE — PROGRESS NOTE ADULT - SUBJECTIVE AND OBJECTIVE BOX
[facility] Division of Kidney Diseases & Hypertension  FOLLOW UP NOTE  [phone]--------------------------------------------------------------------------------  HPI: 77 year old male with PMH of Afib, CAD s/p CABG, BPH, HLD, HTN, and hypothyroidism who presented to the hospital on 3/25 with complaints of shortness of breath and wiley gain. The patient had been nonadherent with his diuretic regimen as outpatient and presented with acute on chronic decompensated heart failure. The patient was initiated on IV diuresis with significant improvement in volume status with approximately 15lb weight loss. Hospital course was further complicated by MSSA bacteremia requiring pacemaker extraction with micra implant and currently being managed with IVC abx with PICC line in place. The nephrology team was consulted for oliguric BEULAH. [facility] noted to have a SCr of 1 on admission which since 4/1 has progressively increased to 7.02 most recently.    24 hour events/subjective:  Pt. seen and examined this morning. Pt. reports BL LE edema and mild SOB. Also endorses low appetite, and fatigue. He denies any headaches, fevers/chills, chest pain, and abdominal pain.        PAST HISTORY  --------------------------------------------------------------------------------  No significant changes to PMH, PSH, FHx, SHx, unless otherwise noted    ALLERGIES & MEDICATIONS  --------------------------------------------------------------------------------  Allergies    alfuzosin (Hives)  levofloxacin (Hives)  Myrbetriq (Hives)  tamsulosin (Hives)    Intolerances      Standing Inpatient Medications  aspirin  chewable 81 milliGRAM(s) Oral daily  atorvastatin 40 milliGRAM(s) Oral at bedtime  buMETAnide Injectable 2 milliGRAM(s) IV Push two times a day  chlorhexidine 4% Liquid 1 Application(s) Topical <User Schedule>  heparin  Infusion 1400 Unit(s)/Hr IV Continuous <Continuous>  levothyroxine 137 MICROGram(s) Oral daily  metoprolol succinate  milliGRAM(s) Oral daily  polyethylene glycol 3350 17 Gram(s) Oral daily  senna 2 Tablet(s) Oral at bedtime  sodium zirconium cyclosilicate 10 Gram(s) Oral daily    PRN Inpatient Medications  acetaminophen     Tablet .. 650 milliGRAM(s) Oral every 6 hours PRN  bisacodyl Suppository 10 milliGRAM(s) Rectal daily PRN      REVIEW OF SYSTEMS  --------------------------------------------------------------------------------  See HPI    VITALS/PHYSICAL EXAM  --------------------------------------------------------------------------------  T(C): 36.3 (04-09-23 @ 11:22), Max: 36.9 (04-08-23 @ 21:02)  HR: 80 (04-09-23 @ 11:22) (80 - 85)  BP: 111/68 (04-09-23 @ 11:22) (105/60 - 121/50)  RR: 18 (04-09-23 @ 11:22) (18 - 18)  SpO2: 94% (04-09-23 @ 11:22) (94% - 97%)  Wt(kg): --      04-08-23 @ 07:01  -  04-09-23 @ 07:00  --------------------------------------------------------  IN: 506 mL / OUT: 250 mL / NET: 256 mL      Physical Exam:  Gen: NAD  HEENT: MMM  Pulm: CTA B/L  CV: S1S2  Abd: Soft, +BS   Ext: ++ LE edema B/L  Neuro: Awake  Skin: Warm and dry    LABS/STUDIES  --------------------------------------------------------------------------------              9.2    10.61 >-----------<  272      [04-08-23 @ 07:00]              27.0     127  |  86  |  124  ----------------------------<  111      [04-09-23 @ 06:20]  5.5   |  24  |  9.05        Ca     8.2     [04-09-23 @ 06:20]      Mg     2.0     [04-09-23 @ 06:20]      Phos  8.6     [04-09-23 @ 06:20]      PTT: 47.8       [04-09-23 @ 09:27]      Creatinine Trend:  SCr 9.05 [04-09 @ 06:20]  SCr 8.53 [04-08 @ 18:10]  SCr 8.03 [04-08 @ 06:59]  SCr 7.57 [04-07 @ 16:49]  SCr 7.01 [04-07 @ 06:12]    Urinalysis - [04-06-23 @ 09:02]      Color Red / Appearance Turbid / SG 1.027 / pH 7.0      Gluc Negative / Ketone Negative  / Bili Small / Urobili Negative       Blood Large / Protein 300 mg/dL / Leuk Est Small / Nitrite Positive      RBC >50 / WBC 5 / Hyaline 0 / Gran  / Sq Epi  / Non Sq Epi  / Bacteria Few    Urine Creatinine 66      [04-06-23 @ 19:21]  Urine Protein 63      [04-06-23 @ 19:21]  Urine Sodium 111      [04-05-23 @ 14:53]  Urine Potassium 29      [04-05-23 @ 14:53]  Urine Chloride 71      [04-05-23 @ 14:53]  Urine Osmolality 299      [04-05-23 @ 14:53]      HBsAg Nonreact      [04-06-23 @ 11:29]  HCV 0.15, Nonreact      [04-06-23 @ 11:46]  HIV Nonreact      [04-06-23 @ 11:32]    JAGRUTI: titer 1:160, pattern Homogeneous      [04-06-23 @ 11:29]  C3 Complement 126      [04-06-23 @ 11:47]  C4 Complement 25      [04-06-23 @ 11:47]  ANCA: cANCA Negative, pANCA Negative, atypical ANCA Negative      [04-06-23 @ 11:29]  anti-GBM <0.2      [04-06-23 @ 11:46]

## 2023-04-09 NOTE — PROGRESS NOTE ADULT - ATTENDING COMMENTS
The nephrology team was consulted for oliguric BEULAH. On review of [facility] pt noted to have a SCr of 1 on admission which since 4/1 has progressively increased.  +SOB, + fatigue remains.  Emphasized severity of renal dysfunction and AGREES HD requirement acutely tomorrow  1.  ARF--w/u in progress, borderline for HD indication and initiate tomorrow.  Likely multifactorial ATN.  May require bx  2.  Volume overload--judicious diuresis and likely benefit from UF.  Trend BNP  3.  Hyperkalemia--loop (not K sparing diuretic), Lokelma    discussed with med team and extensively with attending  Bg Valerio MD  contact me on TEAMS

## 2023-04-09 NOTE — PROGRESS NOTE ADULT - SUBJECTIVE AND OBJECTIVE BOX
Date of Service: 04-09-23 @ 07:07           CARDIOLOGY     PROGRESS  NOTE   ________________________________________________    CHIEF COMPLAINT:Patient is a 77y old  Male who presents with a chief complaint of Chart Reviewed, Events noted  " 77  year old male h/o  CAD s/p stent , asa. A-fib/ PPM, , hypothyroid, and total left knee replacement  prior  PPM  interrogation  with  [facility]/   s/p  brief   bursts  of  afib/ , on prior visit. Ct chest, retrosternal hematoma.  mod left pl effusion was  on bumex/  aldactone admitted with  worsening   pedal  edema  component  of  cellulitis   and  from  c/c  diastolic  chf,/  h/o  l/edema  of L  leg pt  admits to  being  non complaint with  meds"   no complain    	  REVIEW OF SYSTEMS:  CONSTITUTIONAL: No fever, weight loss, or fatigue  EYES: No eye pain, visual disturbances, or discharge  ENT:  No difficulty hearing, tinnitus, vertigo; No sinus or throat pain  NECK: No pain or stiffness  RESPIRATORY: No cough, wheezing, chills or hemoptysis; No Shortness of Breath  CARDIOVASCULAR: No chest pain, palpitations, passing out, dizziness, or leg swelling  GASTROINTESTINAL: No abdominal or epigastric pain. No nausea, vomiting, or hematemesis; No diarrhea or constipation. No melena or hematochezia.  GENITOURINARY: No dysuria, frequency, hematuria, or incontinence  NEUROLOGICAL: No headaches, memory loss, loss of strength, numbness, or tremors  SKIN: No itching, burning, rashes, or lesions   LYMPH Nodes: No enlarged glands  ENDOCRINE: No heat or cold intolerance; No hair loss  MUSCULOSKELETAL: No joint pain or swelling; No muscle, back, or extremity pain  PSYCHIATRIC: No depression, anxiety, mood swings, or difficulty sleeping  HEME/LYMPH: No easy bruising, or bleeding gums  ALLERGY AND IMMUNOLOGIC: No hives or eczema	    [ x] All others negative	  [ ] Unable to obtain    PHYSICAL EXAM:  T(C): 36.6 (04-09-23 @ 04:43), Max: 36.9 (04-08-23 @ 11:58)  HR: 83 (04-09-23 @ 04:43) (80 - 85)  BP: 121/50 (04-09-23 @ 04:43) (105/60 - 121/50)  RR: 18 (04-09-23 @ 04:43) (18 - 18)  SpO2: 97% (04-09-23 @ 04:43) (95% - 97%)  Wt(kg): --  I&O's Summary    08 Apr 2023 07:01  -  09 Apr 2023 07:00  --------------------------------------------------------  IN: 506 mL / OUT: 250 mL / NET: 256 mL        Appearance: Normal	  HEENT:   Normal oral mucosa, PERRL, EOMI	  Lymphatic: No lymphadenopathy  Cardiovascular: Normal S1 S2, No JVD, + murmurs, + edema  Respiratory: rhonchi  Psychiatry: A & O x 3, Mood & affect appropriate  Gastrointestinal:  Soft, Non-tender, + BS	  Skin: No rashes, No ecchymoses, No cyanosis	  Neurologic: Non-focal  Extremities: Normal range of motion, No clubbing, cyanosis , + edema, + astrexis  Vascular: + pvd    MEDICATIONS  (STANDING):  aspirin  chewable 81 milliGRAM(s) Oral daily  atorvastatin 40 milliGRAM(s) Oral at bedtime  buMETAnide Injectable 2 milliGRAM(s) IV Push two times a day  chlorhexidine 4% Liquid 1 Application(s) Topical <User Schedule>  heparin  Infusion 1400 Unit(s)/Hr (14.5 mL/Hr) IV Continuous <Continuous>  levothyroxine 137 MICROGram(s) Oral daily  metoprolol succinate  milliGRAM(s) Oral daily  polyethylene glycol 3350 17 Gram(s) Oral daily  senna 2 Tablet(s) Oral at bedtime  sodium zirconium cyclosilicate 10 Gram(s) Oral daily      TELEMETRY: 	    ECG:  	  RADIOLOGY:  OTHER: 	  	  LABS:	 	    CARDIAC MARKERS:                                9.2    10.61 )-----------( 272      ( 08 Apr 2023 07:00 )             27.0     04-09    127<L>  |  86<L>  |  124<H>  ----------------------------<  111<H>  5.5<H>   |  24  |  9.05<H>    Ca    8.2<L>      09 Apr 2023 06:20  Phos  8.6     04-09  Mg     2.0     04-09      proBNP:   Lipid Profile:   HgA1c:   TSH:   PTT - ( 09 Apr 2023 00:26 )  PTT:45.3 sec    The nephrology team was consulted for oliguric BEULAH. On review of [facility] pt noted to have a SCr of 1 on admission which since 4/1 has progressively increased to 7.02 most recently.  +SOB, + fatigue.  Emphasized severity of renal dysfunction and possible HD requirement acutely  1.  ARF--w/u in progress, borderline for HD indication although not overtly uremic and without tremor, asterixis.  Likely multifactorial ATN  2.  Volume overload--judicious diuresis and may benefit from UF.  Trend BNP  3.  Hyperkalemia--loop (not K sparing diuretic), Lokelma    Assessment and plan  ---------------------------  77y m pmh BPH, Chronic venous insufficiency, HLD Hypothyroidism, S/P CABG x 2, S/P mitral valve replacement, SP Status post angioplasty, Sp TKR rt, Anasarca Pt comes to ed c/o shortness of breath for past month w progressive worsening of anasarca w swelling of scrotum and diff urinating, Has been noncompliant on diuretics for past two weeks w 20lb weight gain over past month. No fever and chills, sputum, nvdc, cp. PE Adult male lying stretcher w maked swelling to josh lower extr, w pitting edema,  chest  scant josh crackles w slight wheeze and prolonged exp phase  chf acute on chronic sec to RV dysfunction  tele, AFib hr controlled  will adjust cardiac meds  +BC i bottle, MSSA , s/p ppm explant  abx as per ID  s/p PPM and LEAD extraction  oob too chair  PICC line needs 6 weeks of abx  bladder scan was negative  acute on chronic renal failure/ renal appreciated  dc aldactone and Bumex  gentle hydration, repeat lytes in the afternoon, plan discussed with ACP  acute renal failure/ hematuria/ac was held awaiting renal eval/urology called awaiting ct abdomen and pelvis has ordered since last night awaiting results  gentle hydration  ID follow up regarding abx on vanco  ac held sec to hematuria, may re start on ac as clear by urology  beta blocker sed to rate control and hx of cad, s/p CABG  no further hematuria, start iv heparin low dose observe closely so far so good  fu cbc, fu vanco level, ID is following up  startyed on Bumex  worsening renal failure need HD, will discuss with Dr Valerio, may consider ir catheter placement for HD  fu vanco level

## 2023-04-09 NOTE — CHART NOTE - NSCHARTNOTEFT_GEN_A_CORE
Vancomycin level from this AM is noted  Creatinine also continues to uptrend  Hold Vancomycin today  Repeat random Vancomycin level tomorrow AM (ordered)    Louie Nunes M.D.  [facility] Division of Infectious Disease  8AM-5PM Monday - Friday: Available on [employer] Teams  After Hours and Holidays (or if no response on [employer] Teams): Please contact the Infectious Diseases Office at [phone]

## 2023-04-09 NOTE — PROGRESS NOTE ADULT - PROBLEM SELECTOR PLAN 1
Pt. with oliguric BEULAH. Differential also includes AIN perhaps in setting of Cefazolin? vs over-diuresos. On review of [facility] pt noted to have a SCr of 1 on admission which since 4/1 has progressively increased to 7 most recently. UA with blood (suspected from delgadillo catheter insertion) and also proteinuria of 1.0g (initial elevation likely from gross hematuria and over-estimated).   Pending JAGRUTI, P-ANCA, C-ANCA, Anti-GBM ab, HBsAg, Hep C antibody, HIV, Parvovirus, C3, C4, SPEP, serum immunofixation, free kappa lambda light chain, anti PLA2R.  Renal sonogram demonstrating a horseshoe kidney without evidence of hydronephrosis or renal calculi. Delgadillo catheter in place, with small amount of bloody urine.  Pt clinically volume overloaded; recommend continuing Bumex 2 mg IV BID for today.  Given worsening kidney function with uremic symptoms, recommend IR consult for HD catheter placement. No acute indications for HD currently, plan for HD treatment tomorrow.  HD consent obtained and placed in the chart.   Monitor labs and urine output. Avoid nephrotoxins. Dose medications as per eGFR.

## 2023-04-09 NOTE — PROGRESS NOTE ADULT - ASSESSMENT
77  year old male    h/o  CAD s/p stent , asa.      A-fib/ PPM, , hypothyroid, and total left knee replacement    prior  PPM  interrogation  with  [facility]/   s/p  brief   bursts  of  afib/ , on prior visit   Ct chest, retrosternal hematoma.  mod left pl effusion    was  on bumex/  aldactone        *    admitted with    wprsening   pedal  edema          component  of  cellulitis   and  from  c/c  diastolic  chf,/  h/o  l/edema  of L  leg          pt  admits to  being  non complaint with  meds        *   h/o    c/c AFIB,   has  PPM         on eliquis     *    Anemia, , hb stable, had  been seen by  gi  eval dr joann sanchez in past     *    CAD,     cath, with 2 vessel disease,  s/p  CABG and  MVR  surg d r marni   *      h/o  Afib on    eliquis          c/c leg redness/  4 +  edema, on keflex, has  improved    *     echo,  on 7/2022,  ef  35/ new/ severe  TR          cath,   was non  obstructive    *    on synthroid          on asa/  torpol/  eliquis          crt  noted,   from  lasix// aldactone.  leg  swelling  is  lessening   *   MSSA  bacterinia           was  on iv ancef.  ,  rpt bcx    are  negative          explant pf  ppm  and  Micra  placement on  3/30/23. dr sunitha rocha   *    Echo,  ef  25,  mod  AI.  severe  TR        PICC  line  and  extended  course  of  ab.         bumex./ aldactone stopped/        BEULAH,   seen  by  house [facility]      ?  AIN,  hnece  ab was  switched   to iv vanco,  per  ID     ? need  for  renal  bx,  defer   to  [place]  renal/   doppler arm,   no  dvt         follow  vanco  level  and dose  accordingly      on  iv heparin/  Afib      crt  9  and rising,   renal  to  f/p.          card /  dr mikayla dick         rad< from: TTE with Doppler (w/Cont) (07.05.22 @ 09:42) >  Conclusions:  Endocardial visualization enhanced with intravenous  injection of Ultrasonic Enhancing Agent (Definity).  Severe left ventricular enlargement.  Estimated ejection fraction 35%. Diffuse hypokinesis, with  inferior akinesis.  Bioprosthetic mitral valve with normal function.  Right ventricular enlargement with decreased right  ventricular systolic function.  A device wire is noted in the right heart.  ------------------------------------------------------------------------  Confirmed on  7/5/2022 - 12:16:10 by Kristian    < end of copied text >

## 2023-04-09 NOTE — CONSULT NOTE ADULT - SUBJECTIVE AND OBJECTIVE BOX
Vascular & Interventional Radiology    HPI: 77y Male with PMH of Afib, CAD s/p CABG, BPH, HLD, HTN, and hypothyroidism who presented to the hospital on 3/25 with complaints of shortness of breath and weight gain. The patient had been nonadherent with his diuretic regimen as outpatient and presented with acute on chronic decompensated heart failure. The patient was initiated on IV diuresis with significant improvement in volume status with approximately 15lb weight loss. Hospital course was further complicated by MSSA bacteremia requiring pacemaker extraction with micra implant and currently being managed with IVC abx with PICC line in place. Course also c/b oliguric BEULAH suspected in setting of overdiuresis and volume depletion followed by ARF and uremia.    Allergies: alfuzosin (Hives)  levofloxacin (Hives)  Myrbetriq (Hives)  tamsulosin (Hives)    Medications (Abx/Cardiac/Anticoagulation/Blood Products)  heparin  Infusion: 14.5 mL/Hr IV Continuous (04-09 @ 05:45)  heparin  Infusion: 15.5 mL/Hr IV Continuous (04-09 @ 14:57)  heparin  Infusion: 11 mL/Hr IV Continuous (04-08 @ 02:00)  heparin  Infusion: 13 mL/Hr IV Continuous (04-08 @ 11:43)    Data:  T(C): 36.3  HR: 80  BP: 111/68  RR: 18  SpO2: 94%    -WBC 10.61 / HgB 9.2 / Hct 27.0 / Plt 272  -Na 127 / Cl 86 /  / Glucose 111  -K 5.5 / CO2 24 / Cr 9.05    Assessment:   77y Male w/ acute on chronic CHF. Hospital course c/b ARF planned for HD Monday.    Plan:   - Will plan for Shiley placement Monday.  - Local only, does not have to be NPO.   - Will need to hold Hep gtt for procedure. Can call [phone] in AM to coordinate timing.  - ASA okay to continue.  - Place IR procedure order under Dr. Mcnamara.      --  Black Shah MD, PGY-6  Vascular and Interventional Radiology  Available on [employer] Teams    - Nonemergent consults:  [facility] order "Consult- Interventional Radiology"  - Emergent issues (pager): [facility] [phone]; [facility] [phone]; [phone]  - Scheduling questions: [facility] [phone]; [facility] [phone]  - Clinic/outpatient booking: [facility] [phone]; [facility] [phone]

## 2023-04-09 NOTE — PROGRESS NOTE ADULT - SUBJECTIVE AND OBJECTIVE BOX
afberile  REVIEW OF SYSTEMS:  CONSTITUTIONAL: No fever,  no  weight loss  ENT:  No  tinnitus,   no   vertigo  NECK: No pain or stiffness  RESPIRATORY: No cough, wheezing, chills or hemoptysis;    No Shortness of Breath  CARDIOVASCULAR: No chest pain, palpitations, dizziness  GASTROINTESTINAL: No abdominal or epigastric pain. No nausea, vomiting, or hematemesis; No diarrhea  No melena or hematochezia.  GENITOURINARY: No dysuria, frequency, hematuria, or incontinence  NEUROLOGICAL: No headaches  SKIN: No itching,  no   rash  LYMPH Nodes: No enlarged glands  ENDOCRINE: No heat or cold intolerance  MUSCULOSKELETAL: No joint pain or swelling  PSYCHIATRIC: No depression, anxiety  HEME/LYMPH: No easy bruising, or bleeding gums  ALLERGY AND IMMUNOLOGIC: No hives or eczema	    MEDICATIONS  (STANDING):  aspirin  chewable 81 milliGRAM(s) Oral daily  atorvastatin 40 milliGRAM(s) Oral at bedtime  buMETAnide Injectable 2 milliGRAM(s) IV Push two times a day  chlorhexidine 4% Liquid 1 Application(s) Topical <User Schedule>  heparin  Infusion 1400 Unit(s)/Hr (14.5 mL/Hr) IV Continuous <Continuous>  levothyroxine 137 MICROGram(s) Oral daily  metoprolol succinate  milliGRAM(s) Oral daily  polyethylene glycol 3350 17 Gram(s) Oral daily  senna 2 Tablet(s) Oral at bedtime  sodium zirconium cyclosilicate 10 Gram(s) Oral daily    MEDICATIONS  (PRN):  acetaminophen     Tablet .. 650 milliGRAM(s) Oral every 6 hours PRN Mild Pain (1 - 3)  bisacodyl Suppository 10 milliGRAM(s) Rectal daily PRN Constipation      Vital Signs Last 24 Hrs  T(C): 36.6 (09 Apr 2023 04:43), Max: 36.9 (08 Apr 2023 11:58)  T(F): 97.8 (09 Apr 2023 04:43), Max: 98.5 (08 Apr 2023 11:58)  HR: 83 (09 Apr 2023 04:43) (80 - 85)  BP: 121/50 (09 Apr 2023 04:43) (105/60 - 121/50)  BP(mean): --  RR: 18 (09 Apr 2023 04:43) (18 - 18)  SpO2: 97% (09 Apr 2023 04:43) (95% - 97%)    Parameters below as of 09 Apr 2023 04:43  Patient On (Oxygen Delivery Method): room air      CAPILLARY BLOOD GLUCOSE        I&O's Summary    08 Apr 2023 07:01  -  09 Apr 2023 07:00  --------------------------------------------------------  IN: 506 mL / OUT: 250 mL / NET: 256 mL          Appearance: Normal	  HEENT:   Normal oral mucosa, PERRL, EOMI	  Lymphatic: No lymphadenopathy  Cardiovascular: Normal S1 S2, No JVD  Respiratory: Lungs clear to auscultation	  Psychiatry: A & O x 3, Mood & affect appropriate  Gastrointestinal:  Soft, Non-tender, + BS	  Skin: No rash, No ecchymoses	  Extremities: Normal range of motion   less  edema    LABS:                        9.2    10.61 )-----------( 272      ( 08 Apr 2023 07:00 )             27.0     04-09    127<L>  |  86<L>  |  124<H>  ----------------------------<  111<H>  5.5<H>   |  24  |  9.05<H>    Ca    8.2<L>      09 Apr 2023 06:20  Phos  8.6     04-09  Mg     2.0     04-09      PTT - ( 09 Apr 2023 09:27 )  PTT:47.8 sec                        Consultant(s) Notes Reviewed:      Care Discussed with Consultants/Other Providers:

## 2023-04-10 LAB
ANION GAP SERPL CALC-SCNC: 14 MMOL/L — SIGNIFICANT CHANGE UP (ref 5–17)
ANION GAP SERPL CALC-SCNC: 18 MMOL/L — HIGH (ref 5–17)
APTT BLD: 52.1 SEC — HIGH (ref 27.5–35.5)
BUN SERPL-MCNC: 135 MG/DL — HIGH (ref 7–23)
BUN SERPL-MCNC: 84 MG/DL — HIGH (ref 7–23)
CALCIUM SERPL-MCNC: 8 MG/DL — LOW (ref 8.4–10.5)
CALCIUM SERPL-MCNC: 8.1 MG/DL — LOW (ref 8.4–10.5)
CHLORIDE SERPL-SCNC: 86 MMOL/L — LOW (ref 96–108)
CHLORIDE SERPL-SCNC: 90 MMOL/L — LOW (ref 96–108)
CO2 SERPL-SCNC: 24 MMOL/L — SIGNIFICANT CHANGE UP (ref 22–31)
CO2 SERPL-SCNC: 25 MMOL/L — SIGNIFICANT CHANGE UP (ref 22–31)
CREAT SERPL-MCNC: 6.5 MG/DL — HIGH (ref 0.5–1.3)
CREAT SERPL-MCNC: 9.66 MG/DL — HIGH (ref 0.5–1.3)
EGFR: 5 ML/MIN/1.73M2 — LOW
EGFR: 8 ML/MIN/1.73M2 — LOW
GLUCOSE SERPL-MCNC: 100 MG/DL — HIGH (ref 70–99)
GLUCOSE SERPL-MCNC: 123 MG/DL — HIGH (ref 70–99)
HCT VFR BLD CALC: 25.4 % — LOW (ref 39–50)
HGB BLD-MCNC: 8.5 G/DL — LOW (ref 13–17)
MAGNESIUM SERPL-MCNC: 2.2 MG/DL — SIGNIFICANT CHANGE UP (ref 1.6–2.6)
MCHC RBC-ENTMCNC: 29.4 PG — SIGNIFICANT CHANGE UP (ref 27–34)
MCHC RBC-ENTMCNC: 33.5 GM/DL — SIGNIFICANT CHANGE UP (ref 32–36)
MCV RBC AUTO: 87.9 FL — SIGNIFICANT CHANGE UP (ref 80–100)
NRBC # BLD: 0 /100 WBCS — SIGNIFICANT CHANGE UP (ref 0–0)
PHOSPHATE SERPL-MCNC: 9.6 MG/DL — HIGH (ref 2.5–4.5)
PHOSPHOLIPASE A2 RECEPTOR ELISA: 4.2 RU/ML — SIGNIFICANT CHANGE UP (ref 0–19.9)
PLATELET # BLD AUTO: 269 K/UL — SIGNIFICANT CHANGE UP (ref 150–400)
POTASSIUM SERPL-MCNC: 4.3 MMOL/L — SIGNIFICANT CHANGE UP (ref 3.5–5.3)
POTASSIUM SERPL-MCNC: 5.2 MMOL/L — SIGNIFICANT CHANGE UP (ref 3.5–5.3)
POTASSIUM SERPL-SCNC: 4.3 MMOL/L — SIGNIFICANT CHANGE UP (ref 3.5–5.3)
POTASSIUM SERPL-SCNC: 5.2 MMOL/L — SIGNIFICANT CHANGE UP (ref 3.5–5.3)
RBC # BLD: 2.89 M/UL — LOW (ref 4.2–5.8)
RBC # FLD: 14.4 % — SIGNIFICANT CHANGE UP (ref 10.3–14.5)
SARS-COV-2 RNA SPEC QL NAA+PROBE: SIGNIFICANT CHANGE UP
SODIUM SERPL-SCNC: 128 MMOL/L — LOW (ref 135–145)
SODIUM SERPL-SCNC: 129 MMOL/L — LOW (ref 135–145)
VANCOMYCIN FLD-MCNC: 17 UG/ML — SIGNIFICANT CHANGE UP
VANCOMYCIN FLD-MCNC: 19 UG/ML — SIGNIFICANT CHANGE UP
WBC # BLD: 8.93 K/UL — SIGNIFICANT CHANGE UP (ref 3.8–10.5)
WBC # FLD AUTO: 8.93 K/UL — SIGNIFICANT CHANGE UP (ref 3.8–10.5)

## 2023-04-10 PROCEDURE — 36556 INSERT NON-TUNNEL CV CATH: CPT | Mod: RT

## 2023-04-10 PROCEDURE — 99233 SBSQ HOSP IP/OBS HIGH 50: CPT | Mod: GC

## 2023-04-10 PROCEDURE — 77001 FLUOROGUIDE FOR VEIN DEVICE: CPT | Mod: 26

## 2023-04-10 PROCEDURE — 76937 US GUIDE VASCULAR ACCESS: CPT | Mod: 26

## 2023-04-10 PROCEDURE — 36556 INSERT NON-TUNNEL CV CATH: CPT

## 2023-04-10 RX ORDER — HEPARIN SODIUM 5000 [USP'U]/ML
1600 INJECTION INTRAVENOUS; SUBCUTANEOUS
Qty: 25000 | Refills: 0 | Status: DISCONTINUED | OUTPATIENT
Start: 2023-04-10 | End: 2023-04-11

## 2023-04-10 RX ORDER — SODIUM CHLORIDE 9 MG/ML
10 INJECTION INTRAMUSCULAR; INTRAVENOUS; SUBCUTANEOUS
Refills: 0 | Status: DISCONTINUED | OUTPATIENT
Start: 2023-04-10 | End: 2023-05-01

## 2023-04-10 RX ADMIN — Medication 137 MICROGRAM(S): at 05:38

## 2023-04-10 RX ADMIN — BUMETANIDE 2 MILLIGRAM(S): 0.25 INJECTION INTRAMUSCULAR; INTRAVENOUS at 21:33

## 2023-04-10 RX ADMIN — HEPARIN SODIUM 16 UNIT(S)/HR: 5000 INJECTION INTRAVENOUS; SUBCUTANEOUS at 00:05

## 2023-04-10 RX ADMIN — BUMETANIDE 2 MILLIGRAM(S): 0.25 INJECTION INTRAMUSCULAR; INTRAVENOUS at 05:39

## 2023-04-10 RX ADMIN — Medication 100 MILLIGRAM(S): at 05:38

## 2023-04-10 RX ADMIN — HEPARIN SODIUM 16 UNIT(S)/HR: 5000 INJECTION INTRAVENOUS; SUBCUTANEOUS at 18:21

## 2023-04-10 RX ADMIN — HEPARIN SODIUM 16 UNIT(S)/HR: 5000 INJECTION INTRAVENOUS; SUBCUTANEOUS at 11:30

## 2023-04-10 RX ADMIN — SODIUM ZIRCONIUM CYCLOSILICATE 10 GRAM(S): 10 POWDER, FOR SUSPENSION ORAL at 05:38

## 2023-04-10 RX ADMIN — CHLORHEXIDINE GLUCONATE 1 APPLICATION(S): 213 SOLUTION TOPICAL at 05:44

## 2023-04-10 RX ADMIN — SODIUM ZIRCONIUM CYCLOSILICATE 10 GRAM(S): 10 POWDER, FOR SUSPENSION ORAL at 16:48

## 2023-04-10 RX ADMIN — ATORVASTATIN CALCIUM 40 MILLIGRAM(S): 80 TABLET, FILM COATED ORAL at 21:32

## 2023-04-10 RX ADMIN — HEPARIN SODIUM 16 UNIT(S)/HR: 5000 INJECTION INTRAVENOUS; SUBCUTANEOUS at 20:41

## 2023-04-10 NOTE — PROGRESS NOTE ADULT - SUBJECTIVE AND OBJECTIVE BOX
afebrile  REVIEW OF SYSTEMS:  CONSTITUTIONAL: No fever,  no  weight loss  ENT:  No  tinnitus,   no   vertigo  NECK: No pain or stiffness  RESPIRATORY: No cough, wheezing, chills or hemoptysis;    No Shortness of Breath  CARDIOVASCULAR: No chest pain, palpitations, dizziness  GASTROINTESTINAL: No abdominal or epigastric pain. No nausea, vomiting, or hematemesis; No diarrhea  No melena or hematochezia.  GENITOURINARY: No dysuria, frequency, hematuria, or incontinence  NEUROLOGICAL: No headaches  SKIN: No itching,  no   rash  LYMPH Nodes: No enlarged glands  ENDOCRINE: No heat or cold intolerance  MUSCULOSKELETAL: No joint pain or swelling  PSYCHIATRIC: No depression, anxiety  HEME/LYMPH: No easy bruising, or bleeding gums  ALLERGY AND IMMUNOLOGIC: No hives or eczema	    MEDICATIONS  (STANDING):  aspirin  chewable 81 milliGRAM(s) Oral daily  atorvastatin 40 milliGRAM(s) Oral at bedtime  buMETAnide Injectable 2 milliGRAM(s) IV Push two times a day  chlorhexidine 4% Liquid 1 Application(s) Topical <User Schedule>  heparin  Infusion 1550 Unit(s)/Hr (16 mL/Hr) IV Continuous <Continuous>  levothyroxine 137 MICROGram(s) Oral daily  metoprolol succinate  milliGRAM(s) Oral daily  polyethylene glycol 3350 17 Gram(s) Oral daily  senna 2 Tablet(s) Oral at bedtime  sodium zirconium cyclosilicate 10 Gram(s) Oral two times a day    MEDICATIONS  (PRN):  acetaminophen     Tablet .. 650 milliGRAM(s) Oral every 6 hours PRN Mild Pain (1 - 3)  bisacodyl Suppository 10 milliGRAM(s) Rectal daily PRN Constipation      Vital Signs Last 24 Hrs  T(C): 36.7 (10 Apr 2023 04:21), Max: 36.8 (10 Apr 2023 00:46)  T(F): 98 (10 Apr 2023 04:21), Max: 98.3 (10 Apr 2023 00:46)  HR: 69 (10 Apr 2023 04:21) (69 - 99)  BP: 96/59 (10 Apr 2023 04:21) (95/58 - 122/61)  BP(mean): 83 (09 Apr 2023 11:22) (83 - 83)  RR: 18 (10 Apr 2023 04:21) (18 - 18)  SpO2: 92% (10 Apr 2023 04:21) (92% - 94%)    Parameters below as of 10 Apr 2023 04:21  Patient On (Oxygen Delivery Method): room air      CAPILLARY BLOOD GLUCOSE        I&O's Summary    09 Apr 2023 07:01  -  10 Apr 2023 07:00  --------------------------------------------------------  IN: 480 mL / OUT: 150 mL / NET: 330 mL          Appearance: Normal	  HEENT:   Normal oral mucosa, PERRL, EOMI	  Lymphatic: No lymphadenopathy  Cardiovascular: Normal S1 S2, No JVD  Respiratory: Lungs clear to auscultation	  Psychiatry: A & O x 3, Mood & affect appropriate  Gastrointestinal:  Soft, Non-tender, + BS	  Skin: No rash, No ecchymoses	  Extremities: Normal range of motion  Vascular: Peripheral pulses palpable bilaterall  c/c  edema    LABS:                        8.5    8.93  )-----------( 269      ( 10 Apr 2023 06:32 )             25.4     04-10    128<L>  |  86<L>  |  135<H>  ----------------------------<  100<H>  5.2   |  24  |  9.66<H>    Ca    8.0<L>      10 Apr 2023 06:31  Phos  9.6     04-10  Mg     2.2     04-10      PTT - ( 10 Apr 2023 06:22 )  PTT:52.1 sec                        Consultant(s) Notes Reviewed:      Care Discussed with Consultants/Other Providers:

## 2023-04-10 NOTE — PROGRESS NOTE ADULT - ATTENDING COMMENTS
mssa bacteremia, ppm explant  # peter- horsehoe kidney  atn vs ain vs other  bun.cr uptrending, lytes stable, volume status stable  proteinuria/hematuria; repeat prot/cr now prot/cr 1.0   serologic GN workup neg  abx changed to vanco; dose by level   monitor UO  still no sign of any renal improvement  d/w pt in detail; plan to start dialysis today; shiley by IR to be placed  if no signs of renal recovery plan for renal biopsy early next week  hold ASA, and please call IR to schedule; will need to stop and restart heparin approp to biopsy schedule; d/w Dr Mills  # volume overload  Echo,  ef  25,  mod  AI.  severe  TR  + LE edema - bumex 2mg IV bid  #hyperkalemia- k stable; monitor trend; on Lokelma 10g bid    #hyponatremia; monitor trend;

## 2023-04-10 NOTE — PROGRESS NOTE ADULT - PROBLEM SELECTOR PLAN 1
Pt. with oliguric BEULAH. Differential also includes AIN perhaps in setting of Cefazolin? vs overdiuresis. On review of [facility] pt noted to have a SCr of 1 on admission which since 4/1 has progressively increased to 7 most recently. UA with blood (suspected from delgadillo catheter insertion) and also proteinuria of 1.0g (initial elevation likely from gross hematuria and over-estimated).   Thus far JAGRUTI positive with homogenous pattern,   ANCA neg, complement levels not low, HBsAg neg, Hep C antibody neg,   Pending, Anti-GBM ab, SPEP, serum immunofixation, free kappa lambda light chain, anti PLA2R.  Renal sonogram demonstrating a horseshoe kidney without evidence of hydronephrosis or renal calculi. Delgadillo catheter in place, with small amount of bloody urine.  Pt clinically volume overloaded; and remains oliguric at this time despite being on IV diuretics.   Plan will be for first session HD today following IR placement of a nontunneled HD catheter  HD consent obtained and placed in the chart.   Monitor labs and urine output. Avoid nephrotoxins. Dose medications as per eGFR. Pt. with oliguric BEULAH. Differential also includes AIN perhaps in setting of Cefazolin? vs overdiuresis. On review of [facility] pt noted to have a SCr of 1 on admission which since 4/1 has progressively increased to 7 most recently. UA with blood (suspected from delgadillo catheter insertion) and also proteinuria of 1.0g (initial elevation likely from gross hematuria and over-estimated).   Thus far JAGRUTI positive with homogenous pattern,   ANCA neg, complement levels not low, HBsAg neg, Hep C antibody neg,   Pending, Anti-GBM ab, SPEP, serum immunofixation, free kappa lambda light chain, anti PLA2R.  Renal sonogram demonstrating a horseshoe kidney without evidence of hydronephrosis or renal calculi. Delgadillo catheter in place, with small amount of bloody urine.  Pt clinically volume overloaded; and remains oliguric at this time despite being on IV diuretics.   Plan will be for first session HD today following IR placement of a nontunneled HD catheter  HD consent obtained and placed in the chart.   Pt may benefit from kidney biopsy; will optimize with HD first  Monitor labs and urine output. Avoid nephrotoxins. Dose medications as per eGFR.

## 2023-04-10 NOTE — PROGRESS NOTE ADULT - SUBJECTIVE AND OBJECTIVE BOX
Date of Service: 04-10-23 @ 07:17           CARDIOLOGY     PROGRESS  NOTE   ________________________________________________    CHIEF COMPLAINT:Patient is a 77y old  Male who presents with a chief complaint of Chart Reviewed, Events noted  " 77  year old male h/o  CAD s/p stent , asa. A-fib/ PPM, , hypothyroid, and total left knee replacement  prior  PPM  interrogation  with  [facility]/   s/p  brief   bursts  of  afib/ , on prior visit. Ct chest, retrosternal hematoma.  mod left pl effusion was  on bumex/  aldactone admitted with  worsening   pedal  edema  component  of  cellulitis   and  from  c/c  diastolic  chf,/  h/o  l/edema  of L  leg pt  admits to  being  non complaint with  meds"    no complain    	  REVIEW OF SYSTEMS:  CONSTITUTIONAL: No fever, weight loss, or fatigue  EYES: No eye pain, visual disturbances, or discharge  ENT:  No difficulty hearing, tinnitus, vertigo; No sinus or throat pain  NECK: No pain or stiffness  RESPIRATORY: No cough, wheezing, chills or hemoptysis; + mild Shortness of Breath  CARDIOVASCULAR: No chest pain, palpitations, passing out, dizziness, +leg swelling  GASTROINTESTINAL: No abdominal or epigastric pain. No nausea, vomiting, or hematemesis; No diarrhea or constipation. No melena or hematochezia.  GENITOURINARY: No dysuria, frequency, hematuria, or incontinence  NEUROLOGICAL: No headaches, memory loss, loss of strength, numbness, or tremors  SKIN: No itching, burning, rashes, or lesions   LYMPH Nodes: No enlarged glands  ENDOCRINE: No heat or cold intolerance; No hair loss  MUSCULOSKELETAL: No joint pain or swelling; No muscle, back, or extremity pain  PSYCHIATRIC: No depression, anxiety, mood swings, or difficulty sleeping  HEME/LYMPH: No easy bruising, or bleeding gums  ALLERGY AND IMMUNOLOGIC: No hives or eczema	    [ x] All others negative	  [ ] Unable to obtain    PHYSICAL EXAM:  T(C): 36.7 (04-10-23 @ 04:21), Max: 36.8 (04-10-23 @ 00:46)  HR: 69 (04-10-23 @ 04:21) (69 - 99)  BP: 96/59 (04-10-23 @ 04:21) (95/58 - 122/61)  RR: 18 (04-10-23 @ 04:21) (18 - 18)  SpO2: 92% (04-10-23 @ 04:21) (92% - 94%)  Wt(kg): --  I&O's Summary    09 Apr 2023 07:01  -  10 Apr 2023 07:00  --------------------------------------------------------  IN: 480 mL / OUT: 150 mL / NET: 330 mL        Appearance: Normal	  HEENT:   Normal oral mucosa, PERRL, EOMI	  Lymphatic: No lymphadenopathy  Cardiovascular: Normal S1 S2, No JVD, + murmurs, + edema  Respiratory: rhonchi	  Psychiatry: A & O x 3, Mood & affect appropriate  Gastrointestinal:  Soft, Non-tender, + BS	  Skin: No rashes, No ecchymoses, No cyanosis	  Neurologic: Non-focal  Extremities: Normal range of motion, No clubbing, cyanosis , + edema  Vascular: Peripheral pulses palpable 2+ bilaterally    MEDICATIONS  (STANDING):  aspirin  chewable 81 milliGRAM(s) Oral daily  atorvastatin 40 milliGRAM(s) Oral at bedtime  buMETAnide Injectable 2 milliGRAM(s) IV Push two times a day  chlorhexidine 4% Liquid 1 Application(s) Topical <User Schedule>  heparin  Infusion 1550 Unit(s)/Hr (16 mL/Hr) IV Continuous <Continuous>  levothyroxine 137 MICROGram(s) Oral daily  metoprolol succinate  milliGRAM(s) Oral daily  polyethylene glycol 3350 17 Gram(s) Oral daily  senna 2 Tablet(s) Oral at bedtime  sodium zirconium cyclosilicate 10 Gram(s) Oral two times a day      TELEMETRY: 	    ECG:  	  RADIOLOGY:  OTHER: 	  	  LABS:	 	    CARDIAC MARKERS:                                8.5    8.93  )-----------( 269      ( 10 Apr 2023 06:32 )             25.4     04-09    127<L>  |  85<L>  |  123<H>  ----------------------------<  139<H>  5.4<H>   |  23  |  9.32<H>    Ca    8.3<L>      09 Apr 2023 19:13  Phos  8.6     04-09  Mg     2.0     04-09      proBNP:   Lipid Profile:   HgA1c:   TSH:   PTT - ( 10 Apr 2023 06:22 )  PTT:52.1 sec      Assessment and plan  ---------------------------  77y m pmh BPH, Chronic venous insufficiency, HLD Hypothyroidism, S/P CABG x 2, S/P mitral valve replacement, SP Status post angioplasty, Sp TKR rt, Anasarca Pt comes to ed c/o shortness of breath for past month w progressive worsening of anasarca w swelling of scrotum and diff urinating, Has been noncompliant on diuretics for past two weeks w 20lb weight gain over past month. No fever and chills, sputum, nvdc, cp. PE Adult male lying stretcher w maked swelling to josh lower extr, w pitting edema,  chest  scant josh crackles w slight wheeze and prolonged exp phase  chf acute on chronic sec to RV dysfunction  tele, AFib hr controlled  will adjust cardiac meds  +BC i bottle, MSSA , s/p ppm explant  abx as per ID  s/p PPM and LEAD extraction  oob too chair  PICC line needs 6 weeks of abx  bladder scan was negative  acute on chronic renal failure/ renal appreciated  dc aldactone and Bumex  gentle hydration, repeat lytes in the afternoon, plan discussed with ACP  acute renal failure/ hematuria/ac was held awaiting renal eval/urology called awaiting ct abdomen and pelvis has ordered since last night awaiting results  gentle hydration  ID follow up regarding abx on vanco  ac held sec to hematuria, may re start on ac as clear by urology  beta blocker sed to rate control and hx of cad, s/p CABG  no further hematuria, start iv heparin low dose observe closely so far so good  fu cbc, fu vanco level, ID is following up  started on Bumex  worsening renal failure need HD, will discuss with Dr Valerio, may consider ir catheter placement for HD  fu vanco level  needs hd with increasing renal function, fluid overload, discussed with Dr Valerio  yesterday ?HD today  awaiting blood work from nataliya

## 2023-04-10 NOTE — PROGRESS NOTE ADULT - ASSESSMENT
77  year old male    h/o  CAD s/p stent , asa.      A-fib/ PPM, , hypothyroid, and total left knee replacement    prior  PPM  interrogation  with  [facility]/   s/p  brief   bursts  of  afib/ , on prior visit   Ct chest, retrosternal hematoma.  mod left pl effusion    was  on bumex/  aldactone        *    admitted   for  worsening   pedal  edema          component  of  cellulitis   and  from  c/c  diastolic  chf,/  h/o  l/edema  of L  leg         pt  admits to  being  non complaint with  meds        *   h/o    c/c AFIB,   has  PPM         on eliquis     *    Anemia, , hb stable, had  been seen by  gi  eval dr joann sanchez in past     *    CAD,     cath, with 2 vessel disease,  s/p  CABG and  MVR  surg d r marni   *      h/o  Afib on    eliquis          c/c leg redness/  4 +  edema, on keflex, has  improved    *     echo,  on 7/2022,  ef  35/ new/ severe  TR          cath,   was non  obstructive    *    on synthroid          on asa/  torpol/  eliquis          crt  noted,   from  lasix// aldactone.  leg  swelling  is  lessening   *   MSSA  bacterinia           was  on iv ancef.  ,  rpt bcx    are  negative          explant pf  ppm  and  Micra  placement on  3/30/23. dr sunitha rocha   *    Echo,  ef  25,  mod  AI.  severe  TR        PICC  line  and  extended  course  of  ab.         bumex./ aldactone stopped/        BEULAH,   seen  by  house renal      ?  AIN,  hence   ab was  switched   to iv vanco,  per  ID        follow  vanco  level  and dose  accordingly      on  iv heparin/  Afib      crt  9,9  and   rising,   renal  to  f/p..  defer timing  of  HD  to renal          card /  dr mikayla dick         rad< from: TTE with Doppler (w/Cont) (07.05.22 @ 09:42) >  Conclusions:  Endocardial visualization enhanced with intravenous  injection of Ultrasonic Enhancing Agent (Definity).  Severe left ventricular enlargement.  Estimated ejection fraction 35%. Diffuse hypokinesis, with  inferior akinesis.  Bioprosthetic mitral valve with normal function.  Right ventricular enlargement with decreased right  ventricular systolic function.  A device wire is noted in the right heart.  ------------------------------------------------------------------------  Confirmed on  7/5/2022 - 12:16:10 by Kristian    < end of copied text >

## 2023-04-10 NOTE — PROGRESS NOTE ADULT - SUBJECTIVE AND OBJECTIVE BOX
[facility] DIVISION OF KIDNEY DISEASES AND HYPERTENSION -- FOLLOW UP NOTE  --------------------------------------------------------------------------------  HPI: 77 year old male with PMH of Afib, CAD s/p CABG, BPH, HLD, HTN, and hypothyroidism who presented to the hospital on 3/25 with complaints of shortness of breath and wiley gain. The patient had been nonadherent with his diuretic regimen as outpatient and presented with acute on chronic decompensated heart failure. The patient was initiated on IV diuresis with significant improvement in volume status with approximately 15lb weight loss. Hospital course was further complicated by MSSA bacteremia requiring pacemaker extraction with micra implant and currently being managed with IVC abx with PICC line in place. The nephrology team was consulted for oliguric BEULAH. [facility] noted to have a SCr of 1 on admission which since 4/1 has progressively increased to 9.32 most recently.    24 hour events/subjective:  Pt. seen and examined this morning. Pt. reports BL LE edema. States his appetite is better but still not well. Admitted to feeling very sleepy.   He denies any headaches, fevers/chills, chest pain, and abdominal pain.      PAST HISTORY  --------------------------------------------------------------------------------  No significant changes to PMH, PSH, FHx, SHx, unless otherwise noted    ALLERGIES & MEDICATIONS  --------------------------------------------------------------------------------  Allergies    alfuzosin (Hives)  levofloxacin (Hives)  Myrbetriq (Hives)  tamsulosin (Hives)    Intolerances      Standing Inpatient Medications  aspirin  chewable 81 milliGRAM(s) Oral daily  atorvastatin 40 milliGRAM(s) Oral at bedtime  buMETAnide Injectable 2 milliGRAM(s) IV Push two times a day  chlorhexidine 4% Liquid 1 Application(s) Topical <User Schedule>  heparin  Infusion 1550 Unit(s)/Hr IV Continuous <Continuous>  levothyroxine 137 MICROGram(s) Oral daily  metoprolol succinate  milliGRAM(s) Oral daily  polyethylene glycol 3350 17 Gram(s) Oral daily  senna 2 Tablet(s) Oral at bedtime  sodium zirconium cyclosilicate 10 Gram(s) Oral two times a day    PRN Inpatient Medications  acetaminophen     Tablet .. 650 milliGRAM(s) Oral every 6 hours PRN  bisacodyl Suppository 10 milliGRAM(s) Rectal daily PRN      REVIEW OF SYSTEMS    All other systems were reviewed and are negative, except as noted.    VITALS/PHYSICAL EXAM  --------------------------------------------------------------------------------  T(C): 36.7 (04-10-23 @ 04:21), Max: 36.8 (04-10-23 @ 00:46)  HR: 69 (04-10-23 @ 04:21) (69 - 99)  BP: 96/59 (04-10-23 @ 04:21) (95/58 - 122/61)  RR: 18 (04-10-23 @ 04:21) (18 - 18)  SpO2: 92% (04-10-23 @ 04:21) (92% - 94%)  Wt(kg): --        04-09-23 @ 07:01  -  04-10-23 @ 07:00  --------------------------------------------------------  IN: 480 mL / OUT: 150 mL / NET: 330 mL        Physical Exam:  	Gen: NAD  	HEENT: MMM  	Pulm: CTA B/L  	CV: S1S2  	Abd: Soft, +BS   	Ext: ++ LE edema B/L  	Neuro: Awake  	Skin: Warm and dry    LABS/STUDIES  --------------------------------------------------------------------------------              8.5    8.93  >-----------<  269      [04-10-23 @ 06:32]              25.4     127  |  85  |  123  ----------------------------<  139      [04-09-23 @ 19:13]  5.4   |  23  |  9.32        Ca     8.3     [04-09-23 @ 19:13]      Mg     2.0     [04-09-23 @ 06:20]      Phos  8.6     [04-09-23 @ 06:20]        PTT: 52.1       [04-10-23 @ 06:22]      Creatinine Trend:  SCr 9.32 [04-09 @ 19:13]  SCr 9.05 [04-09 @ 06:20]  SCr 8.53 [04-08 @ 18:10]  SCr 8.03 [04-08 @ 06:59]  SCr 7.57 [04-07 @ 16:49]    Urinalysis - [04-06-23 @ 09:02]      Color Red / Appearance Turbid / SG 1.027 / pH 7.0      Gluc Negative / Ketone Negative  / Bili Small / Urobili Negative       Blood Large / Protein 300 mg/dL / Leuk Est Small / Nitrite Positive      RBC >50 / WBC 5 / Hyaline 0 / Gran  / Sq Epi  / Non Sq Epi  / Bacteria Few    Urine Creatinine 66      [04-06-23 @ 19:21]  Urine Protein 63      [04-06-23 @ 19:21]  Urine Sodium 111      [04-05-23 @ 14:53]  Urine Potassium 29      [04-05-23 @ 14:53]  Urine Chloride 71      [04-05-23 @ 14:53]  Urine Osmolality 299      [04-05-23 @ 14:53]      HBsAg Nonreact      [04-06-23 @ 11:29]  HCV 0.15, Nonreact      [04-06-23 @ 11:46]  HIV Nonreact      [04-06-23 @ 11:32]    JAGRUTI: titer 1:160, pattern Homogeneous      [04-06-23 @ 11:29]  C3 Complement 126      [04-06-23 @ 11:47]  C4 Complement 25      [04-06-23 @ 11:47]  ANCA: cANCA Negative, pANCA Negative, atypical ANCA Negative      [04-06-23 @ 11:29]  anti-GBM <0.2      [04-06-23 @ 11:46]

## 2023-04-11 LAB
% ALBUMIN: 35.9 % — SIGNIFICANT CHANGE UP
% ALPHA 1: 8.6 % — SIGNIFICANT CHANGE UP
% ALPHA 2: 14.3 % — SIGNIFICANT CHANGE UP
% BETA: 17.8 % — SIGNIFICANT CHANGE UP
% GAMMA: 23.4 % — SIGNIFICANT CHANGE UP
% M SPIKE: SIGNIFICANT CHANGE UP
ALBUMIN SERPL ELPH-MCNC: 2 G/DL — LOW (ref 3.6–5.5)
ALBUMIN/GLOB SERPL ELPH: 0.5 RATIO — SIGNIFICANT CHANGE UP
ALPHA1 GLOB SERPL ELPH-MCNC: 0.5 G/DL — HIGH (ref 0.1–0.4)
ALPHA2 GLOB SERPL ELPH-MCNC: 0.8 G/DL — SIGNIFICANT CHANGE UP (ref 0.5–1)
ANION GAP SERPL CALC-SCNC: 11 MMOL/L — SIGNIFICANT CHANGE UP (ref 5–17)
ANION GAP SERPL CALC-SCNC: 18 MMOL/L — HIGH (ref 5–17)
APTT BLD: 35.9 SEC — HIGH (ref 27.5–35.5)
APTT BLD: 38.9 SEC — HIGH (ref 27.5–35.5)
APTT BLD: 97.1 SEC — HIGH (ref 27.5–35.5)
B-GLOBULIN SERPL ELPH-MCNC: 1 G/DL — SIGNIFICANT CHANGE UP (ref 0.5–1)
BUN SERPL-MCNC: 51 MG/DL — HIGH (ref 7–23)
BUN SERPL-MCNC: 90 MG/DL — HIGH (ref 7–23)
CALCIUM SERPL-MCNC: 7.7 MG/DL — LOW (ref 8.4–10.5)
CALCIUM SERPL-MCNC: 8.1 MG/DL — LOW (ref 8.4–10.5)
CHLORIDE SERPL-SCNC: 88 MMOL/L — LOW (ref 96–108)
CHLORIDE SERPL-SCNC: 93 MMOL/L — LOW (ref 96–108)
CO2 SERPL-SCNC: 24 MMOL/L — SIGNIFICANT CHANGE UP (ref 22–31)
CO2 SERPL-SCNC: 27 MMOL/L — SIGNIFICANT CHANGE UP (ref 22–31)
CREAT SERPL-MCNC: 5.02 MG/DL — HIGH (ref 0.5–1.3)
CREAT SERPL-MCNC: 7.45 MG/DL — HIGH (ref 0.5–1.3)
EGFR: 11 ML/MIN/1.73M2 — LOW
EGFR: 7 ML/MIN/1.73M2 — LOW
GAMMA GLOBULIN: 1.3 G/DL — SIGNIFICANT CHANGE UP (ref 0.6–1.6)
GLUCOSE SERPL-MCNC: 104 MG/DL — HIGH (ref 70–99)
GLUCOSE SERPL-MCNC: 115 MG/DL — HIGH (ref 70–99)
HCT VFR BLD CALC: 25.2 % — LOW (ref 39–50)
HGB BLD-MCNC: 8.5 G/DL — LOW (ref 13–17)
INTERPRETATION SERPL IFE-IMP: SIGNIFICANT CHANGE UP
M-SPIKE: SIGNIFICANT CHANGE UP (ref 0–0)
MCHC RBC-ENTMCNC: 30 PG — SIGNIFICANT CHANGE UP (ref 27–34)
MCHC RBC-ENTMCNC: 33.7 GM/DL — SIGNIFICANT CHANGE UP (ref 32–36)
MCV RBC AUTO: 89 FL — SIGNIFICANT CHANGE UP (ref 80–100)
NRBC # BLD: 0 /100 WBCS — SIGNIFICANT CHANGE UP (ref 0–0)
PHOSPHATE SERPL-MCNC: 7.6 MG/DL — HIGH (ref 2.5–4.5)
PLATELET # BLD AUTO: 263 K/UL — SIGNIFICANT CHANGE UP (ref 150–400)
POTASSIUM SERPL-MCNC: 3.8 MMOL/L — SIGNIFICANT CHANGE UP (ref 3.5–5.3)
POTASSIUM SERPL-MCNC: 4.5 MMOL/L — SIGNIFICANT CHANGE UP (ref 3.5–5.3)
POTASSIUM SERPL-SCNC: 3.8 MMOL/L — SIGNIFICANT CHANGE UP (ref 3.5–5.3)
POTASSIUM SERPL-SCNC: 4.5 MMOL/L — SIGNIFICANT CHANGE UP (ref 3.5–5.3)
PROT PATTERN SERPL ELPH-IMP: SIGNIFICANT CHANGE UP
RBC # BLD: 2.83 M/UL — LOW (ref 4.2–5.8)
RBC # FLD: 14.3 % — SIGNIFICANT CHANGE UP (ref 10.3–14.5)
SODIUM SERPL-SCNC: 130 MMOL/L — LOW (ref 135–145)
SODIUM SERPL-SCNC: 131 MMOL/L — LOW (ref 135–145)
UFH PPP CHRO-ACNC: 0.24 IU/ML — LOW (ref 0.3–0.7)
UFH PPP CHRO-ACNC: 0.25 IU/ML — LOW (ref 0.3–0.7)
VANCOMYCIN FLD-MCNC: 14.3 UG/ML — SIGNIFICANT CHANGE UP
WBC # BLD: 7.98 K/UL — SIGNIFICANT CHANGE UP (ref 3.8–10.5)
WBC # FLD AUTO: 7.98 K/UL — SIGNIFICANT CHANGE UP (ref 3.8–10.5)

## 2023-04-11 PROCEDURE — 99233 SBSQ HOSP IP/OBS HIGH 50: CPT | Mod: GC

## 2023-04-11 RX ORDER — VANCOMYCIN HCL 1 G
750 VIAL (EA) INTRAVENOUS ONCE
Refills: 0 | Status: COMPLETED | OUTPATIENT
Start: 2023-04-11 | End: 2023-04-12

## 2023-04-11 RX ORDER — HEPARIN SODIUM 5000 [USP'U]/ML
1500 INJECTION INTRAVENOUS; SUBCUTANEOUS
Qty: 25000 | Refills: 0 | Status: DISCONTINUED | OUTPATIENT
Start: 2023-04-11 | End: 2023-04-13

## 2023-04-11 RX ORDER — HEPARIN SODIUM 5000 [USP'U]/ML
1700 INJECTION INTRAVENOUS; SUBCUTANEOUS
Qty: 25000 | Refills: 0 | Status: DISCONTINUED | OUTPATIENT
Start: 2023-04-11 | End: 2023-04-11

## 2023-04-11 RX ADMIN — SENNA PLUS 2 TABLET(S): 8.6 TABLET ORAL at 22:05

## 2023-04-11 RX ADMIN — POLYETHYLENE GLYCOL 3350 17 GRAM(S): 17 POWDER, FOR SOLUTION ORAL at 22:04

## 2023-04-11 RX ADMIN — BUMETANIDE 2 MILLIGRAM(S): 0.25 INJECTION INTRAMUSCULAR; INTRAVENOUS at 05:58

## 2023-04-11 RX ADMIN — Medication 100 MILLIGRAM(S): at 05:59

## 2023-04-11 RX ADMIN — SODIUM ZIRCONIUM CYCLOSILICATE 10 GRAM(S): 10 POWDER, FOR SUSPENSION ORAL at 05:59

## 2023-04-11 RX ADMIN — HEPARIN SODIUM 17 UNIT(S)/HR: 5000 INJECTION INTRAVENOUS; SUBCUTANEOUS at 11:58

## 2023-04-11 RX ADMIN — ATORVASTATIN CALCIUM 40 MILLIGRAM(S): 80 TABLET, FILM COATED ORAL at 22:05

## 2023-04-11 RX ADMIN — HEPARIN SODIUM 14 UNIT(S)/HR: 5000 INJECTION INTRAVENOUS; SUBCUTANEOUS at 13:07

## 2023-04-11 RX ADMIN — CHLORHEXIDINE GLUCONATE 1 APPLICATION(S): 213 SOLUTION TOPICAL at 06:18

## 2023-04-11 RX ADMIN — BUMETANIDE 2 MILLIGRAM(S): 0.25 INJECTION INTRAMUSCULAR; INTRAVENOUS at 13:06

## 2023-04-11 RX ADMIN — HEPARIN SODIUM 15 UNIT(S)/HR: 5000 INJECTION INTRAVENOUS; SUBCUTANEOUS at 22:06

## 2023-04-11 RX ADMIN — Medication 137 MICROGRAM(S): at 05:59

## 2023-04-11 RX ADMIN — HEPARIN SODIUM 17 UNIT(S)/HR: 5000 INJECTION INTRAVENOUS; SUBCUTANEOUS at 02:22

## 2023-04-11 NOTE — CHART NOTE - NSCHARTNOTEFT_GEN_A_CORE
Please administer Vancomycin 750 mg IV post-HD today  Continue to check random Vancomycin AM levels    Discussed case with Dr Suarez yesterday.  Planned tentatively for renal biopsy  If AIN is discovered on biopsy results then would weigh risks and benefits of proceeding with corticosteroid therapy however, from ID standpoint the bacteremia was low grade and repeat cultures are clear.     I will continue to follow. Please feel free to contact me with any further questions.    Louie Nunes M.D.  [facility] Division of Infectious Disease  8AM-5PM Monday - Friday: Available on [employer] Teams  After Hours and Holidays (or if no response on [employer] Teams): Please contact the Infectious Diseases Office at [phone]

## 2023-04-11 NOTE — PROGRESS NOTE ADULT - ASSESSMENT
77  year old male    h/o  CAD s/p stent , asa.      A-fib/ PPM, , hypothyroid, and total left knee replacement    prior  PPM  interrogation  with  WCT/   s/p  brief   bursts  of  afib/ , on prior visit   Ct chest, retrosternal hematoma.  mod left pl effusion    was  on bumex/  aldactone        *    admitted   for  worsening   pedal  edema          component  of  cellulitis   and  from  c/c  diastolic  chf,/  h/o  l/edema  of L  leg         pt  admits to  being  non complaint with  meds        *   h/o    c/c AFIB,   has  PPM         on eliquis     *    Anemia, , hb stable, had  been seen by  gi  eval dr joann sanchez in past     *    CAD,     cath, with 2 vessel disease,  s/p  CABG and  MVR  surg d r marni   *      h/o  Afib on    eliquis          c/c leg redness/  4 +  edema, on keflex, has  improved    *     echo,  on 7/2022,  ef  35/ new/ severe  TR          cath,   was non  obstructive    *    on synthroid          on asa/  torpol/  eliquis          crt  noted,   from  lasix// aldactone.  leg  swelling  is  lessening   *   MSSA  bacterinia           was  on iv ancef.  ,  rpt bcx    are  negative          explant pf  ppm  and  Micra  placement on  3/30/23. dr sunitha rocha   *    Echo,  ef  25,  mod  AI.  severe  TR        PICC  line  and  extended  course  of  ab.         bumex./ aldactone stopped/        BEULAH,   seen  by  house renal      ?  AIN,  hence   ab was  switched   to iv vanco,  per  ID        follow  vanco  level  and dose  accordingly      on  iv heparin/  Afib      crt   was  9,9  , now is  7.4   /  HD  to renal . a nd  renal bx  , a s pe r  renal         card /  dr mikayla dick         rad< from: TTE with Doppler (w/Cont) (07.05.22 @ 09:42) >  Conclusions:  Endocardial visualization enhanced with intravenous  injection of Ultrasonic Enhancing Agent (Definity).  Severe left ventricular enlargement.  Estimated ejection fraction 35%. Diffuse hypokinesis, with  inferior akinesis.  Bioprosthetic mitral valve with normal function.  Right ventricular enlargement with decreased right  ventricular systolic function.  A device wire is noted in the right heart.  ------------------------------------------------------------------------  Confirmed on  7/5/2022 - 12:16:10 by Kristian    < end of copied text >

## 2023-04-11 NOTE — PROGRESS NOTE ADULT - ATTENDING COMMENTS
mssa bacteremia, ppm explant  # peter- horsehoe kidney  atn vs ain vs other  bun.cr uptrended and oliguric; started on hd yesterday; plan hd #2 today  proteinuria/hematuria; repeat prot/cr now prot/cr 1.0   serologic GN workup neg  abx changed to vanco; dose by level   monitor UO  still no sign of any renal improvement  if no signs of renal recovery plan for renal biopsy early next week  DC ASA yesterday, and please call IR to schedule biopsy of horseshoe kidney; will need to stop and restart heparin approp to biopsy schedule;    # volume overload  Echo,  ef  25,  mod  AI.  severe  TR  + LE edema - bumex 2mg IV bid  #hyperkalemia- k stable; monitor trend; on Lokelma - can dc for now  #hyponatremia; monitor trend;

## 2023-04-11 NOTE — PROGRESS NOTE ADULT - PROBLEM SELECTOR PLAN 1
Pt. with oliguric BEULAH. Differential also includes AIN perhaps in setting of Cefazolin? vs overdiuresis. On review of [facility] pt noted to have a SCr of 1 on admission which since 4/1 has progressively increased to 7 most recently. UA with blood (suspected from delgadillo catheter insertion) and also proteinuria of 1.0g (initial elevation likely from gross hematuria and over-estimated).   Thus far JAGRUTI positive with homogenous pattern,   ANCA neg, complement levels not low, HBsAg neg, Hep C antibody neg,   Pending, Anti-GBM ab, SPEP, serum immunofixation, free kappa lambda light chain, anti PLA2R.  Renal sonogram demonstrating a horseshoe kidney without evidence of hydronephrosis or renal calculi. Delgadillo catheter in place, with small amount of bloody urine.  Pt underwent first session HD on 4/10 via RIJ nontunneled HD catheter  Pt clinically volume overloaded; and remains oliguric at this time despite being on IV diuretics.   Plan for second session HD today.  Pt may benefit from kidney biopsy; will optimize with HD first  Monitor labs and urine output. Avoid nephrotoxins. Dose medications as per eGFR.

## 2023-04-11 NOTE — PROGRESS NOTE ADULT - SUBJECTIVE AND OBJECTIVE BOX
Date of Service: 04-11-23 @ 11:05           CARDIOLOGY     PROGRESS  NOTE   ________________________________________________    CHIEF COMPLAINT:Patient is a 77y old  Male who presents with a chief complaint of Chart Reviewed, Events noted  " 77  year old male h/o  CAD s/p stent , asa. A-fib/ PPM, , hypothyroid, and total left knee replacement  prior  PPM  interrogation  with  WCT/   s/p  brief   bursts  of  afib/ , on prior visit. Ct chest, retrosternal hematoma.  mod left pl effusion was  on bumex/  aldactone admitted with  worsening   pedal  edema  component  of  cellulitis   and  from  c/c  diastolic  chf,/  h/o  l/edema  of L  leg pt  admits to  being  non complaint with  meds"   no complain    	  REVIEW OF SYSTEMS:  CONSTITUTIONAL: No fever, weight loss, or fatigue  EYES: No eye pain, visual disturbances, or discharge  ENT:  No difficulty hearing, tinnitus, vertigo; No sinus or throat pain  NECK: No pain or stiffness  RESPIRATORY: No cough, wheezing, chills or hemoptysis; + Shortness of Breath  CARDIOVASCULAR: No chest pain, palpitations, passing out, dizziness,,  + leg swelling  GASTROINTESTINAL: No abdominal or epigastric pain. No nausea, vomiting, or hematemesis; No diarrhea or constipation. No melena or hematochezia.  GENITOURINARY: No dysuria, frequency, hematuria, or incontinence  NEUROLOGICAL: No headaches, memory loss, loss of strength, numbness, or tremors  SKIN: No itching, burning, rashes, or lesions   LYMPH Nodes: No enlarged glands  ENDOCRINE: No heat or cold intolerance; No hair loss  MUSCULOSKELETAL: No joint pain or swelling; No muscle, back, or extremity pain  PSYCHIATRIC: No depression, anxiety, mood swings, or difficulty sleeping  HEME/LYMPH: No easy bruising, or bleeding gums  ALLERGY AND IMMUNOLOGIC: No hives or eczema	    [x ] All others negative	  [ ] Unable to obtain    PHYSICAL EXAM:  T(C): 36.7 (04-11-23 @ 05:12), Max: 37 (04-10-23 @ 15:48)  HR: 89 (04-11-23 @ 05:12) (67 - 92)  BP: 93/59 (04-11-23 @ 05:12) (93/57 - 114/64)  RR: 18 (04-11-23 @ 05:12) (16 - 18)  SpO2: 92% (04-11-23 @ 05:12) (92% - 96%)  Wt(kg): --  I&O's Summary    10 Apr 2023 07:01  -  11 Apr 2023 07:00  --------------------------------------------------------  IN: 180 mL / OUT: 1200 mL / NET: -1020 mL        Appearance: Normal	  HEENT:   Normal oral mucosa, PERRL, EOMI	  Lymphatic: No lymphadenopathy  Cardiovascular: Normal S1 S2, No JVD, + murmurs, + edema  Respiratory: rhonchi  Psychiatry: A & O x 3, Mood & affect appropriate  Gastrointestinal:  Soft, Non-tender, + BS	  Skin: No rashes, No ecchymoses, No cyanosis	  Neurologic: Non-focal  Extremities: Normal range of motion, No clubbing, cyanosis o, + edema  Vascular: Peripheral pulses palpable 2+ bilaterally    MEDICATIONS  (STANDING):  atorvastatin 40 milliGRAM(s) Oral at bedtime  buMETAnide Injectable 2 milliGRAM(s) IV Push two times a day  chlorhexidine 4% Liquid 1 Application(s) Topical <User Schedule>  heparin  Infusion 1700 Unit(s)/Hr (17 mL/Hr) IV Continuous <Continuous>  levothyroxine 137 MICROGram(s) Oral daily  metoprolol succinate  milliGRAM(s) Oral daily  polyethylene glycol 3350 17 Gram(s) Oral daily  senna 2 Tablet(s) Oral at bedtime  sodium zirconium cyclosilicate 10 Gram(s) Oral two times a day      TELEMETRY: 	    ECG:  	  RADIOLOGY:  OTHER: 	  	  LABS:	 	    CARDIAC MARKERS:                                8.5    7.98  )-----------( 263      ( 11 Apr 2023 07:08 )             25.2     04-11    130<L>  |  88<L>  |  90<H>  ----------------------------<  104<H>  4.5   |  24  |  7.45<H>    Ca    7.7<L>      11 Apr 2023 07:08  Phos  7.6     04-11  Mg     2.2     04-10      proBNP:   Lipid Profile:   HgA1c:   TSH:   PTT - ( 11 Apr 2023 09:26 )  PTT:97.1 sec    ·  Plan: Pt. with hyperkalemia in setting of BEULAH. Was persistently elevated; now started on HD. Most recent serum potassium WNL. Recommend to stop Lokelma. Plan for HD as mentioned above. Monitor serum potassium.     Assessment and plan  ---------------------------  77y m pmh BPH, Chronic venous insufficiency, HLD Hypothyroidism, S/P CABG x 2, S/P mitral valve replacement, SP Status post angioplasty, Sp TKR rt, Anasarca Pt comes to ed c/o shortness of breath for past month w progressive worsening of anasarca w swelling of scrotum and diff urinating, Has been noncompliant on diuretics for past two weeks w 20lb weight gain over past month. No fever and chills, sputum, nvdc, cp. PE Adult male lying stretcher w maked swelling to josh lower extr, w pitting edema,  chest  scant josh crackles w slight wheeze and prolonged exp phase  chf acute on chronic sec to RV dysfunction  tele, AFib hr controlled  will adjust cardiac meds  +BC i bottle, MSSA , s/p ppm explant  abx as per ID  s/p PPM and LEAD extraction  oob too chair  PICC line needs 6 weeks of abx  bladder scan was negative  acute on chronic renal failure/ renal appreciated  dc aldactone and Bumex  gentle hydration, repeat lytes in the afternoon, plan discussed with ACP  acute renal failure/ hematuria/ac was held awaiting renal eval/urology called awaiting ct abdomen and pelvis has ordered since last night awaiting results  gentle hydration  ID follow up regarding abx on vanco  ac held sec to hematuria, may re start on ac as clear by urology  beta blocker sed to rate control and hx of cad, s/p CABG  no further hematuria, start iv heparin low dose observe closely so far so good  fu cbc, fu vanco level, ID is following up  started on Bumex  worsening renal failure need HD, will discuss with Dr Valerio, may consider ir catheter placement for HD  fu vanco level  needs hd with increasing renal function, fluid overload, discussed with RENAL   renal biopsy   check vanco  level, abx as per ID

## 2023-04-11 NOTE — PROGRESS NOTE ADULT - SUBJECTIVE AND OBJECTIVE BOX
afberile    REVIEW OF SYSTEMS:  CONSTITUTIONAL: No fever,  no  weight loss  ENT:  No  tinnitus,   no   vertigo  NECK: No pain or stiffness  RESPIRATORY: No cough, wheezing, chills or hemoptysis;    No Shortness of Breath  CARDIOVASCULAR: No chest pain, palpitations, dizziness  GASTROINTESTINAL: No abdominal or epigastric pain. No nausea, vomiting, or hematemesis; No diarrhea  No melena or hematochezia.  GENITOURINARY: No dysuria, frequency, hematuria, or incontinence  NEUROLOGICAL: No headaches  SKIN: No itching,  no   rash  LYMPH Nodes: No enlarged glands  ENDOCRINE: No heat or cold intolerance  MUSCULOSKELETAL: No joint pain or swelling  PSYCHIATRIC: No depression, anxiety  HEME/LYMPH: No easy bruising, or bleeding gums  ALLERGY AND IMMUNOLOGIC: No hives or eczema	    MEDICATIONS  (STANDING):  atorvastatin 40 milliGRAM(s) Oral at bedtime  buMETAnide Injectable 2 milliGRAM(s) IV Push two times a day  chlorhexidine 4% Liquid 1 Application(s) Topical <User Schedule>  heparin  Infusion 1700 Unit(s)/Hr (17 mL/Hr) IV Continuous <Continuous>  levothyroxine 137 MICROGram(s) Oral daily  metoprolol succinate  milliGRAM(s) Oral daily  polyethylene glycol 3350 17 Gram(s) Oral daily  senna 2 Tablet(s) Oral at bedtime  sodium zirconium cyclosilicate 10 Gram(s) Oral two times a day    MEDICATIONS  (PRN):  acetaminophen     Tablet .. 650 milliGRAM(s) Oral every 6 hours PRN Mild Pain (1 - 3)  bisacodyl Suppository 10 milliGRAM(s) Rectal daily PRN Constipation  sodium chloride 0.9% lock flush 10 milliLiter(s) IV Push every 1 hour PRN Pre/post blood products, medications, blood draw, and to maintain line patency      Vital Signs Last 24 Hrs  T(C): 36.7 (11 Apr 2023 05:12), Max: 37 (10 Apr 2023 15:48)  T(F): 98.1 (11 Apr 2023 05:12), Max: 98.6 (10 Apr 2023 15:48)  HR: 89 (11 Apr 2023 05:12) (67 - 92)  BP: 93/59 (11 Apr 2023 05:12) (93/57 - 114/64)  BP(mean): --  RR: 18 (11 Apr 2023 05:12) (16 - 18)  SpO2: 92% (11 Apr 2023 05:12) (92% - 96%)    Parameters below as of 11 Apr 2023 05:12  Patient On (Oxygen Delivery Method): nasal cannula      CAPILLARY BLOOD GLUCOSE        I&O's Summary    10 Apr 2023 07:01  -  11 Apr 2023 07:00  --------------------------------------------------------  IN: 180 mL / OUT: 1200 mL / NET: -1020 mL          Appearance: Normal	  HEENT:   Normal oral mucosa, PERRL, EOMI	  Lymphatic: No lymphadenopathy  Cardiovascular: Normal S1 S2, No JVD  Respiratory: Lungs clear to auscultation	  Psychiatry: A & O x 3, Mood & affect appropriate  Gastrointestinal:  Soft, Non-tender, + BS	  Skin: No rash, No ecchymoses	  Extremities: Normal range of motion  Vascular: Peripheral pulses palpable bilaterally    LABS:                        8.5    7.98  )-----------( 263      ( 11 Apr 2023 07:08 )             25.2     04-11    130<L>  |  88<L>  |  90<H>  ----------------------------<  104<H>  4.5   |  24  |  7.45<H>    Ca    7.7<L>      11 Apr 2023 07:08  Phos  7.6     04-11  Mg     2.2     04-10      PTT - ( 11 Apr 2023 00:54 )  PTT:38.9 sec                        Consultant(s) Notes Reviewed:      Care Discussed with Consultants/Other Providers:

## 2023-04-11 NOTE — PROGRESS NOTE ADULT - SUBJECTIVE AND OBJECTIVE BOX
[facility] DIVISION OF KIDNEY DISEASES AND HYPERTENSION -- FOLLOW UP NOTE  --------------------------------------------------------------------------------  HPI: 77 year old male with PMH of Afib, CAD s/p CABG, BPH, HLD, HTN, and hypothyroidism who presented to the hospital on 3/25 with complaints of shortness of breath and wiley gain. The patient had been nonadherent with his diuretic regimen as outpatient and presented with acute on chronic decompensated heart failure. The patient was initiated on IV diuresis with significant improvement in volume status with approximately 15lb weight loss. Hospital course was further complicated by MSSA bacteremia requiring pacemaker extraction with micra implant and currently being managed with IVC abx with PICC line in place. The nephrology team was consulted for oliguric BEULAH. [facility] noted to have a SCr of 1 on admission which since 4/1 has progressively increased to 9.66 initiated on HD 4/10.    24 hour events/subjective:  Pt. seen and examined this morning. Admitted to feeling very sleepy. States he tolerated HD well yesterday  He denies any headaches, fevers/chills, chest pain, and abdominal pain.        PAST HISTORY  --------------------------------------------------------------------------------  No significant changes to PMH, PSH, FHx, SHx, unless otherwise noted    ALLERGIES & MEDICATIONS  --------------------------------------------------------------------------------  Allergies    alfuzosin (Hives)  levofloxacin (Hives)  Myrbetriq (Hives)  tamsulosin (Hives)    Intolerances      Standing Inpatient Medications  atorvastatin 40 milliGRAM(s) Oral at bedtime  buMETAnide Injectable 2 milliGRAM(s) IV Push two times a day  chlorhexidine 4% Liquid 1 Application(s) Topical <User Schedule>  heparin  Infusion 1700 Unit(s)/Hr IV Continuous <Continuous>  levothyroxine 137 MICROGram(s) Oral daily  metoprolol succinate  milliGRAM(s) Oral daily  polyethylene glycol 3350 17 Gram(s) Oral daily  senna 2 Tablet(s) Oral at bedtime  sodium zirconium cyclosilicate 10 Gram(s) Oral two times a day    PRN Inpatient Medications  acetaminophen     Tablet .. 650 milliGRAM(s) Oral every 6 hours PRN  bisacodyl Suppository 10 milliGRAM(s) Rectal daily PRN  sodium chloride 0.9% lock flush 10 milliLiter(s) IV Push every 1 hour PRN      REVIEW OF SYSTEMS    All other systems were reviewed and are negative, except as noted.    VITALS/PHYSICAL EXAM  --------------------------------------------------------------------------------  T(C): 36.7 (04-11-23 @ 05:12), Max: 37 (04-10-23 @ 15:48)  HR: 89 (04-11-23 @ 05:12) (67 - 92)  BP: 93/59 (04-11-23 @ 05:12) (93/57 - 114/64)  RR: 18 (04-11-23 @ 05:12) (16 - 18)  SpO2: 92% (04-11-23 @ 05:12) (92% - 96%)  Wt(kg): --        04-09-23 @ 07:01  -  04-10-23 @ 07:00  --------------------------------------------------------  IN: 480 mL / OUT: 150 mL / NET: 330 mL    04-10-23 @ 07:01  -  04-11-23 @ 06:30  --------------------------------------------------------  IN: 180 mL / OUT: 1200 mL / NET: -1020 mL        Physical Exam:  	Gen: NAD  	HEENT: MMM  	Pulm: CTA B/L  	CV: S1S2  	Abd: Soft, +BS   	Ext: ++ LE edema B/L  	Neuro: Awake  	Skin: Warm and dry  	Vascular access: RIJ nontunneled HD catheter      LABS/STUDIES  --------------------------------------------------------------------------------              8.5    8.93  >-----------<  269      [04-10-23 @ 06:32]              25.4     129  |  90  |  84  ----------------------------<  123      [04-10-23 @ 21:43]  4.3   |  25  |  6.50        Ca     8.1     [04-10-23 @ 21:43]      Mg     2.2     [04-10-23 @ 06:31]      Phos  9.6     [04-10-23 @ 06:31]        PTT: 38.9       [04-11-23 @ 00:54]      Creatinine Trend:  SCr 6.50 [04-10 @ 21:43]  SCr 9.66 [04-10 @ 06:31]  SCr 9.32 [04-09 @ 19:13]  SCr 9.05 [04-09 @ 06:20]  SCr 8.53 [04-08 @ 18:10]    Urinalysis - [04-06-23 @ 09:02]      Color Red / Appearance Turbid / SG 1.027 / pH 7.0      Gluc Negative / Ketone Negative  / Bili Small / Urobili Negative       Blood Large / Protein 300 mg/dL / Leuk Est Small / Nitrite Positive      RBC >50 / WBC 5 / Hyaline 0 / Gran  / Sq Epi  / Non Sq Epi  / Bacteria Few    Urine Creatinine 66      [04-06-23 @ 19:21]  Urine Protein 63      [04-06-23 @ 19:21]  Urine Sodium 111      [04-05-23 @ 14:53]  Urine Potassium 29      [04-05-23 @ 14:53]  Urine Chloride 71      [04-05-23 @ 14:53]  Urine Osmolality 299      [04-05-23 @ 14:53]      HBsAg Nonreact      [04-06-23 @ 11:29]  HCV 0.15, Nonreact      [04-06-23 @ 11:46]  HIV Nonreact      [04-06-23 @ 11:32]    JAGRUTI: titer 1:160, pattern Homogeneous      [04-06-23 @ 11:29]  C3 Complement 126      [04-06-23 @ 11:47]  C4 Complement 25      [04-06-23 @ 11:47]  ANCA: cANCA Negative, pANCA Negative, atypical ANCA Negative      [04-06-23 @ 11:29]  anti-GBM <0.2      [04-06-23 @ 11:46]  PLA2R: BIANCA 4.2, IFA --      [04-06-23 @ 11:46]

## 2023-04-12 ENCOUNTER — APPOINTMENT (OUTPATIENT)
Dept: ELECTROPHYSIOLOGY | Facility: CLINIC | Age: 78
End: 2023-04-12

## 2023-04-12 LAB
ANION GAP SERPL CALC-SCNC: 14 MMOL/L — SIGNIFICANT CHANGE UP (ref 5–17)
APTT BLD: 35.3 SEC — SIGNIFICANT CHANGE UP (ref 27.5–35.5)
APTT BLD: 43.7 SEC — HIGH (ref 27.5–35.5)
BUN SERPL-MCNC: 55 MG/DL — HIGH (ref 7–23)
CALCIUM SERPL-MCNC: 8.2 MG/DL — LOW (ref 8.4–10.5)
CHLORIDE SERPL-SCNC: 93 MMOL/L — LOW (ref 96–108)
CO2 SERPL-SCNC: 27 MMOL/L — SIGNIFICANT CHANGE UP (ref 22–31)
CREAT SERPL-MCNC: 5.52 MG/DL — HIGH (ref 0.5–1.3)
EGFR: 10 ML/MIN/1.73M2 — LOW
GLUCOSE SERPL-MCNC: 98 MG/DL — SIGNIFICANT CHANGE UP (ref 70–99)
HCT VFR BLD CALC: 25.1 % — LOW (ref 39–50)
HGB BLD-MCNC: 8.3 G/DL — LOW (ref 13–17)
MCHC RBC-ENTMCNC: 29.6 PG — SIGNIFICANT CHANGE UP (ref 27–34)
MCHC RBC-ENTMCNC: 33.1 GM/DL — SIGNIFICANT CHANGE UP (ref 32–36)
MCV RBC AUTO: 89.6 FL — SIGNIFICANT CHANGE UP (ref 80–100)
NRBC # BLD: 0 /100 WBCS — SIGNIFICANT CHANGE UP (ref 0–0)
PLATELET # BLD AUTO: 227 K/UL — SIGNIFICANT CHANGE UP (ref 150–400)
POTASSIUM SERPL-MCNC: 4.1 MMOL/L — SIGNIFICANT CHANGE UP (ref 3.5–5.3)
POTASSIUM SERPL-SCNC: 4.1 MMOL/L — SIGNIFICANT CHANGE UP (ref 3.5–5.3)
RBC # BLD: 2.8 M/UL — LOW (ref 4.2–5.8)
RBC # FLD: 14.2 % — SIGNIFICANT CHANGE UP (ref 10.3–14.5)
SODIUM SERPL-SCNC: 134 MMOL/L — LOW (ref 135–145)
UFH PPP CHRO-ACNC: 0.29 IU/ML — LOW (ref 0.3–0.7)
WBC # BLD: 8.64 K/UL — SIGNIFICANT CHANGE UP (ref 3.8–10.5)
WBC # FLD AUTO: 8.64 K/UL — SIGNIFICANT CHANGE UP (ref 3.8–10.5)

## 2023-04-12 PROCEDURE — 99232 SBSQ HOSP IP/OBS MODERATE 35: CPT

## 2023-04-12 PROCEDURE — 99233 SBSQ HOSP IP/OBS HIGH 50: CPT | Mod: GC

## 2023-04-12 RX ADMIN — Medication 250 MILLIGRAM(S): at 08:14

## 2023-04-12 RX ADMIN — ATORVASTATIN CALCIUM 40 MILLIGRAM(S): 80 TABLET, FILM COATED ORAL at 21:40

## 2023-04-12 RX ADMIN — SENNA PLUS 2 TABLET(S): 8.6 TABLET ORAL at 21:41

## 2023-04-12 RX ADMIN — HEPARIN SODIUM 16 UNIT(S)/HR: 5000 INJECTION INTRAVENOUS; SUBCUTANEOUS at 10:03

## 2023-04-12 RX ADMIN — BUMETANIDE 2 MILLIGRAM(S): 0.25 INJECTION INTRAMUSCULAR; INTRAVENOUS at 13:50

## 2023-04-12 RX ADMIN — Medication 137 MICROGRAM(S): at 05:10

## 2023-04-12 RX ADMIN — Medication 650 MILLIGRAM(S): at 17:59

## 2023-04-12 RX ADMIN — HEPARIN SODIUM 16.5 UNIT(S)/HR: 5000 INJECTION INTRAVENOUS; SUBCUTANEOUS at 19:15

## 2023-04-12 RX ADMIN — Medication 100 MILLIGRAM(S): at 05:10

## 2023-04-12 RX ADMIN — HEPARIN SODIUM 16.5 UNIT(S)/HR: 5000 INJECTION INTRAVENOUS; SUBCUTANEOUS at 21:42

## 2023-04-12 RX ADMIN — Medication 650 MILLIGRAM(S): at 18:25

## 2023-04-12 RX ADMIN — BUMETANIDE 2 MILLIGRAM(S): 0.25 INJECTION INTRAMUSCULAR; INTRAVENOUS at 05:10

## 2023-04-12 RX ADMIN — POLYETHYLENE GLYCOL 3350 17 GRAM(S): 17 POWDER, FOR SOLUTION ORAL at 21:40

## 2023-04-12 RX ADMIN — CHLORHEXIDINE GLUCONATE 1 APPLICATION(S): 213 SOLUTION TOPICAL at 05:10

## 2023-04-12 NOTE — PROGRESS NOTE ADULT - SUBJECTIVE AND OBJECTIVE BOX
afbeirle    REVIEW OF SYSTEMS:  CONSTITUTIONAL: No fever,  no  weight loss  ENT:  No  tinnitus,   no   vertigo  NECK: No pain or stiffness  RESPIRATORY: No cough, wheezing, chills or hemoptysis;    No Shortness of Breath  CARDIOVASCULAR: No chest pain, palpitations, dizziness  GASTROINTESTINAL: No abdominal or epigastric pain. No nausea, vomiting, or hematemesis; No diarrhea  No melena or hematochezia.  GENITOURINARY: No dysuria, frequency, hematuria, or incontinence  NEUROLOGICAL: No headaches  SKIN: No itching,  no   rash  LYMPH Nodes: No enlarged glands  ENDOCRINE: No heat or cold intolerance  MUSCULOSKELETAL: No joint pain or swelling  PSYCHIATRIC: No depression, anxiety  HEME/LYMPH: No easy bruising, or bleeding gums  ALLERGY AND IMMUNOLOGIC: No hives or eczema	    MEDICATIONS  (STANDING):  atorvastatin 40 milliGRAM(s) Oral at bedtime  buMETAnide Injectable 2 milliGRAM(s) IV Push two times a day  chlorhexidine 4% Liquid 1 Application(s) Topical <User Schedule>  heparin  Infusion 1500 Unit(s)/Hr (16 mL/Hr) IV Continuous <Continuous>  levothyroxine 137 MICROGram(s) Oral daily  metoprolol succinate  milliGRAM(s) Oral daily  polyethylene glycol 3350 17 Gram(s) Oral daily  senna 2 Tablet(s) Oral at bedtime    MEDICATIONS  (PRN):  acetaminophen     Tablet .. 650 milliGRAM(s) Oral every 6 hours PRN Mild Pain (1 - 3)  bisacodyl Suppository 10 milliGRAM(s) Rectal daily PRN Constipation  sodium chloride 0.9% lock flush 10 milliLiter(s) IV Push every 1 hour PRN Pre/post blood products, medications, blood draw, and to maintain line patency      Vital Signs Last 24 Hrs  T(C): 36.8 (12 Apr 2023 04:35), Max: 36.8 (12 Apr 2023 04:35)  T(F): 98.3 (12 Apr 2023 04:35), Max: 98.3 (12 Apr 2023 04:35)  HR: 80 (12 Apr 2023 04:35) (54 - 87)  BP: 107/76 (12 Apr 2023 04:35) (96/60 - 108/65)  BP(mean): --  RR: 18 (12 Apr 2023 04:35) (17 - 18)  SpO2: 95% (12 Apr 2023 04:35) (94% - 99%)    Parameters below as of 12 Apr 2023 04:35  Patient On (Oxygen Delivery Method): nasal cannula      CAPILLARY BLOOD GLUCOSE        I&O's Summary    11 Apr 2023 07:01  -  12 Apr 2023 07:00  --------------------------------------------------------  IN: 420 mL / OUT: 530 mL / NET: -110 mL          Appearance: Normal	  HEENT:   Normal oral mucosa, PERRL, EOMI	  Lymphatic: No lymphadenopathy  Cardiovascular: Normal S1 S2, No JVD  Respiratory: Lungs clear to auscultation	  Psychiatry: A & O x 3, Mood & affect appropriate  Gastrointestinal:  Soft, Non-tender, + BS	  Skin: No rash, No ecchymoses	  Extremities: Normal range of motion  Vascular: Peripheral pulses palpable bilaterally    LABS:                        8.3    8.64  )-----------( 227      ( 12 Apr 2023 06:46 )             25.1     04-12    134<L>  |  93<L>  |  55<H>  ----------------------------<  98  4.1   |  27  |  5.52<H>    Ca    8.2<L>      12 Apr 2023 06:47  Phos  7.6     04-11      PTT - ( 12 Apr 2023 06:47 )  PTT:35.3 sec                        Consultant(s) Notes Reviewed:      Care Discussed with Consultants/Other Providers:

## 2023-04-12 NOTE — PROGRESS NOTE ADULT - ASSESSMENT
78 y/o male PMHx HTN, HLD, CAD s/p stent on ASA, A-fib, hypothyroid, CHF now presenting to the ED worsening SOB, DUNCAN, peripheral edema, scrotal edema and 20 pound unintentional weight gain over last month with MSSA bacteremia, HF     Problem/Plan - 1:  ·  Problem: MSSA bacteremia.   ·  Plan: -only 1 out of 4 positive - still suspect this is real   -off ancef due to concern for AIN  -repeat bcx negative  -likely source is toe from recent nail clipping   -ERENDIRA negative for vegetations  -s/p PPM extraction and micra placement   -EP input noted  -also with low grade fevers  -picc  -Check Random Vancomycin level tomorrow AM to guide dosing.   -Planned tentatively for renal biopsy  -If AIN is discovered on biopsy results then would weigh risks and benefits of proceeding with corticosteroid therapy however, from ID standpoint the bacteremia was low grade and repeat cultures are clear.      Problem/Plan - 2:  ·  Problem: Heart failure.   ·  Plan: -per cardio.    Louie Nunes M.D.  [facility] Division of Infectious Disease  8AM-5PM Monday - Friday: Available on [employer] Teams  After Hours and Holidays (or if no response on [employer] Teams): Please contact the Infectious Diseases Office at [phone]

## 2023-04-12 NOTE — PROGRESS NOTE ADULT - SUBJECTIVE AND OBJECTIVE BOX
Follow Up:      Interval History:    REVIEW OF SYSTEMS  [  ] ROS unobtainable because:    [  ] All other systems negative except as noted below    Constitutional:  [ ] fever [ ] chills  [ ] weight loss  [ ] weakness  Skin:  [ ] rash [ ] phlebitis	  Eyes: [ ] icterus [ ] pain  [ ] discharge	  ENMT: [ ] sore throat  [ ] thrush [ ] ulcers [ ] exudates  Respiratory: [ ] dyspnea [ ] hemoptysis [ ] cough [ ] sputum	  Cardiovascular:  [ ] chest pain [ ] palpitations [ ] edema	  Gastrointestinal:  [ ] nausea [ ] vomiting [ ] diarrhea [ ] constipation [ ] pain	  Genitourinary:  [ ] dysuria [ ] frequency [ ] hematuria [ ] discharge [ ] flank pain  [ ] incontinence  Musculoskeletal:  [ ] myalgias [ ] arthralgias [ ] arthritis  [ ] back pain  Neurological:  [ ] headache [ ] seizures  [ ] confusion/altered mental status    Allergies  alfuzosin (Hives)  levofloxacin (Hives)  Myrbetriq (Hives)  tamsulosin (Hives)        ANTIMICROBIALS:      OTHER MEDS:  MEDICATIONS  (STANDING):  acetaminophen     Tablet .. 650 every 6 hours PRN  atorvastatin 40 at bedtime  bisacodyl Suppository 10 daily PRN  buMETAnide Injectable 2 two times a day  heparin  Infusion 1500 <Continuous>  levothyroxine 137 daily  metoprolol succinate  daily  polyethylene glycol 3350 17 daily  senna 2 at bedtime      Vital Signs Last 24 Hrs  T(C): 36.4 (12 Apr 2023 17:07), Max: 36.8 (12 Apr 2023 04:35)  T(F): 97.5 (12 Apr 2023 17:07), Max: 98.3 (12 Apr 2023 04:35)  HR: 92 (12 Apr 2023 17:07) (60 - 94)  BP: 105/66 (12 Apr 2023 17:07) (100/53 - 109/62)  BP(mean): --  RR: 18 (12 Apr 2023 17:07) (18 - 18)  SpO2: 94% (12 Apr 2023 17:07) (92% - 98%)    Parameters below as of 12 Apr 2023 17:07  Patient On (Oxygen Delivery Method): nasal cannula  O2 Flow (L/min): 2      PHYSICAL EXAMINATION:  General: Alert and Awake, NAD  HEENT: PERRL, EOMI  Neck: Supple  Cardiac: RRR, No M/R/G  Resp: CTAB, No Wh/Rh/Ra  Abdomen: NBS, NT/ND, No HSM, No rigidity or guarding  MSK: No LE edema. No Calf tenderness  : No delgadillo  Skin: No rashes or lesions. Skin is warm and dry to the touch.   Neuro: Alert and Awake. CN 2-12 Grossly intact. Moves all four extremities spontaneously.  Psych: Calm, Pleasant, Cooperative                          8.3    8.64  )-----------( 227      ( 12 Apr 2023 06:46 )             25.1       04-12    134<L>  |  93<L>  |  55<H>  ----------------------------<  98  4.1   |  27  |  5.52<H>    Ca    8.2<L>      12 Apr 2023 06:47  Phos  7.6     04-11            MICROBIOLOGY:  v  Clean Catch Clean Catch (Midstream)  04-04-23   No growth  --  --      .Other Other  03-30-23   No fungus isolated at 1 week.  --  --      .Blood Blood-Peripheral  03-28-23   No Growth Final  --  --      .Blood  03-25-23   Growth in anaerobic bottle: Staphylococcus aureus  ***Blood Panel PCR results on this specimen are available  approximately 3 hours after the Gram stain result.***  Gram stain, PCR, and/or culture results may not always  correspond due to difference in methodologies.  ************************************************************  This PCR assay was performed by multiplex PCR. This  Assay tests for 66 bacterial and resistance gene targets.  Please refer to the [facility] test directory  at [email or web address] for details.  --  Blood Culture PCR  Staphylococcus aureus      .Blood  03-25-23   No Growth Final  --  --                RADIOLOGY:    <The imaging below has been reviewed and visualized by me independently. Findings as detailed in report below> Follow Up:  MSSA Bacteremia     Interval History: s/p Vancomycin dose this AM. s/p HD yesterday.     REVIEW OF SYSTEMS  [  ] ROS unobtainable because:    [  x] All other systems negative except as noted below    Constitutional:  [ ] fever [ ] chills  [ ] weight loss  [ ] weakness  Skin:  [ ] rash [ ] phlebitis	  Eyes: [ ] icterus [ ] pain  [ ] discharge	  ENMT: [ ] sore throat  [ ] thrush [ ] ulcers [ ] exudates  Respiratory: [ ] dyspnea [ ] hemoptysis [ ] cough [ ] sputum	  Cardiovascular:  [ ] chest pain [ ] palpitations [ ] edema	  Gastrointestinal:  [ ] nausea [ ] vomiting [ ] diarrhea [ ] constipation [ ] pain	  Genitourinary:  [ ] dysuria [ ] frequency [ ] hematuria [ ] discharge [ ] flank pain  [ ] incontinence  Musculoskeletal:  [ ] myalgias [ ] arthralgias [ ] arthritis  [ ] back pain  Neurological:  [ ] headache [ ] seizures  [ ] confusion/altered mental status    Allergies  alfuzosin (Hives)  levofloxacin (Hives)  Myrbetriq (Hives)  tamsulosin (Hives)        ANTIMICROBIALS:      OTHER MEDS:  MEDICATIONS  (STANDING):  acetaminophen     Tablet .. 650 every 6 hours PRN  atorvastatin 40 at bedtime  bisacodyl Suppository 10 daily PRN  buMETAnide Injectable 2 two times a day  heparin  Infusion 1500 <Continuous>  levothyroxine 137 daily  metoprolol succinate  daily  polyethylene glycol 3350 17 daily  senna 2 at bedtime      Vital Signs Last 24 Hrs  T(C): 36.4 (12 Apr 2023 17:07), Max: 36.8 (12 Apr 2023 04:35)  T(F): 97.5 (12 Apr 2023 17:07), Max: 98.3 (12 Apr 2023 04:35)  HR: 92 (12 Apr 2023 17:07) (60 - 94)  BP: 105/66 (12 Apr 2023 17:07) (100/53 - 109/62)  BP(mean): --  RR: 18 (12 Apr 2023 17:07) (18 - 18)  SpO2: 94% (12 Apr 2023 17:07) (92% - 98%)    Parameters below as of 12 Apr 2023 17:07  Patient On (Oxygen Delivery Method): nasal cannula  O2 Flow (L/min): 2      PHYSICAL EXAMINATION:  General: Alert and Awake, NAD  HEENT: Normocephalic / Atraumatic  Resp: No accessory muscles of respiration utilized  Abdomen: Not distended.  MSK: No LE edema.   Vasc: HD Catheter (No surrounding erythema, drainage or tenderness to palpation)  Skin: No rashes or lesions.    Neuro: Alert and Awake. CN 2-12 Grossly intact. Moves all four extremities spontaneously.  Psych: Calm, Pleasant, Cooperative                          8.3    8.64  )-----------( 227      ( 12 Apr 2023 06:46 )             25.1       04-12    134<L>  |  93<L>  |  55<H>  ----------------------------<  98  4.1   |  27  |  5.52<H>    Ca    8.2<L>      12 Apr 2023 06:47  Phos  7.6     04-11            MICROBIOLOGY:  v  Clean Catch Clean Catch (Midstream)  04-04-23   No growth  --  --      .Other Other  03-30-23   No fungus isolated at 1 week.  --  --      .Blood Blood-Peripheral  03-28-23   No Growth Final  --  --      .Blood  03-25-23   Growth in anaerobic bottle: Staphylococcus aureus  ***Blood Panel PCR results on this specimen are available  approximately 3 hours after the Gram stain result.***  Gram stain, PCR, and/or culture results may not always  correspond due to difference in methodologies.  ************************************************************  This PCR assay was performed by multiplex PCR. This  Assay tests for 66 bacterial and resistance gene targets.  Please refer to the [facility] test directory  at [email or web address] for details.  --  Blood Culture PCR  Staphylococcus aureus      .Blood  03-25-23   No Growth Final  --  --    RADIOLOGY:    <The imaging below has been reviewed and visualized by me independently. Findings as detailed in report below>    < from: [facility] Duplex Upper Ext Vein Scan, Right (04.06.23 @ 13:34) >  IMPRESSION:  No evidence of right upper extremity deep venous thrombosis.    < end of copied text >

## 2023-04-12 NOTE — PROGRESS NOTE ADULT - SUBJECTIVE AND OBJECTIVE BOX
[facility] DIVISION OF KIDNEY DISEASES AND HYPERTENSION -- FOLLOW UP NOTE  --------------------------------------------------------------------------------  HPI: 77 year old male with PMH of Afib, CAD s/p CABG, BPH, HLD, HTN, and hypothyroidism who presented to the hospital on 3/25 with complaints of shortness of breath and wiley gain. The patient had been nonadherent with his diuretic regimen as outpatient and presented with acute on chronic decompensated heart failure. The patient was initiated on IV diuresis with significant improvement in volume status with approximately 15lb weight loss. Hospital course was further complicated by MSSA bacteremia requiring pacemaker extraction with micra implant and currently being managed with IVC abx with PICC line in place. The nephrology team was consulted for oliguric BEULAH. On review of [facility] pt noted to have a SCr of 1 on admission which since 4/1 has progressively increased to 9.66 initiated on HD 4/10.    24 hour events/subjective:  Pt. seen and examined this morning. States he tolerated HD well yesterday, UF limited yesterday due to hypotension.   He denies any headaches, fevers/chills, chest pain, and abdominal pain.      PAST HISTORY  --------------------------------------------------------------------------------  No significant changes to PMH, PSH, FHx, SHx, unless otherwise noted    ALLERGIES & MEDICATIONS  --------------------------------------------------------------------------------  Allergies    alfuzosin (Hives)  levofloxacin (Hives)  Myrbetriq (Hives)  tamsulosin (Hives)    Intolerances      Standing Inpatient Medications  atorvastatin 40 milliGRAM(s) Oral at bedtime  buMETAnide Injectable 2 milliGRAM(s) IV Push two times a day  chlorhexidine 4% Liquid 1 Application(s) Topical <User Schedule>  heparin  Infusion 1500 Unit(s)/Hr IV Continuous <Continuous>  levothyroxine 137 MICROGram(s) Oral daily  metoprolol succinate  milliGRAM(s) Oral daily  polyethylene glycol 3350 17 Gram(s) Oral daily  senna 2 Tablet(s) Oral at bedtime  vancomycin  IVPB 750 milliGRAM(s) IV Intermittent once    PRN Inpatient Medications  acetaminophen     Tablet .. 650 milliGRAM(s) Oral every 6 hours PRN  bisacodyl Suppository 10 milliGRAM(s) Rectal daily PRN  sodium chloride 0.9% lock flush 10 milliLiter(s) IV Push every 1 hour PRN      REVIEW OF SYSTEMS      All other systems were reviewed and are negative, except as noted.    VITALS/PHYSICAL EXAM  --------------------------------------------------------------------------------  T(C): 36.8 (04-12-23 @ 04:35), Max: 36.8 (04-12-23 @ 04:35)  HR: 80 (04-12-23 @ 04:35) (54 - 87)  BP: 107/76 (04-12-23 @ 04:35) (96/60 - 108/65)  RR: 18 (04-12-23 @ 04:35) (17 - 18)  SpO2: 95% (04-12-23 @ 04:35) (94% - 99%)  Wt(kg): --        04-11-23 @ 07:01  -  04-12-23 @ 07:00  --------------------------------------------------------  IN: 420 mL / OUT: 530 mL / NET: -110 mL        Physical Exam:  	Gen: NAD  	HEENT: MMM  	Pulm: CTA B/L  	CV: S1S2  	Abd: Soft, +BS   	Ext: ++ LE edema B/L  	Neuro: Awake  	Skin: Warm and dry  	Vascular access: RIJ nontunneled HD catheter    LABS/STUDIES  --------------------------------------------------------------------------------              8.5    7.98  >-----------<  263      [04-11-23 @ 07:08]              25.2     131  |  93  |  51  ----------------------------<  115      [04-11-23 @ 20:17]  3.8   |  27  |  5.02        Ca     8.1     [04-11-23 @ 20:17]      Phos  7.6     [04-11-23 @ 07:08]        PTT: 35.9       [04-11-23 @ 20:17]      Creatinine Trend:  SCr 5.02 [04-11 @ 20:17]  SCr 7.45 [04-11 @ 07:08]  SCr 6.50 [04-10 @ 21:43]  SCr 9.66 [04-10 @ 06:31]  SCr 9.32 [04-09 @ 19:13]    Urinalysis - [04-06-23 @ 09:02]      Color Red / Appearance Turbid / SG 1.027 / pH 7.0      Gluc Negative / Ketone Negative  / Bili Small / Urobili Negative       Blood Large / Protein 300 mg/dL / Leuk Est Small / Nitrite Positive      RBC >50 / WBC 5 / Hyaline 0 / Gran  / Sq Epi  / Non Sq Epi  / Bacteria Few    Urine Creatinine 66      [04-06-23 @ 19:21]  Urine Protein 63      [04-06-23 @ 19:21]  Urine Sodium 111      [04-05-23 @ 14:53]  Urine Potassium 29      [04-05-23 @ 14:53]  Urine Chloride 71      [04-05-23 @ 14:53]  Urine Osmolality 299      [04-05-23 @ 14:53]      HBsAg Nonreact      [04-06-23 @ 11:29]  HCV 0.15, Nonreact      [04-06-23 @ 11:46]  HIV Nonreact      [04-06-23 @ 11:32]    JAGRUTI: titer 1:160, pattern Homogeneous      [04-06-23 @ 11:29]  C3 Complement 126      [04-06-23 @ 11:47]  C4 Complement 25      [04-06-23 @ 11:47]  ANCA: cANCA Negative, pANCA Negative, atypical ANCA Negative      [04-06-23 @ 11:29]  anti-GBM <0.2      [04-06-23 @ 11:46]  PLA2R: BIANCA 4.2, IFA --      [04-06-23 @ 11:46]  Immunofixation Serum:   Weak IgA Lambda Band Identified    Reference Range: None Detected      [04-06-23 @ 11:47]  SPEP Interpretation: Weak Beta-Migrating Paraprotein Identified      [04-06-23 @ 11:47]

## 2023-04-12 NOTE — PROGRESS NOTE ADULT - SUBJECTIVE AND OBJECTIVE BOX
Date of Service: 04-12-23 @ 10:27           CARDIOLOGY     PROGRESS  NOTE   ________________________________________________    CHIEF COMPLAINT:Patient is a 77y old  Male who presents with a chief complaint of Chart Reviewed, Events noted  " 77  year old male h/o  CAD s/p stent , asa. A-fib/ PPM, , hypothyroid, and total left knee replacement  prior  PPM  interrogation  with  [facility]/   s/p  brief   bursts  of  afib/ , on prior visit. Ct chest, retrosternal hematoma.  mod left pl effusion was  on bumex/  aldactone admitted with  worsening   pedal  edema  component  of  cellulitis   and  from  c/c  diastolic  chf,/  h/o  l/edema  of L  leg pt  admits to  being  non complaint with  meds"    no complain    	  REVIEW OF SYSTEMS:  CONSTITUTIONAL: No fever, weight loss, or fatigue  EYES: No eye pain, visual disturbances, or discharge  ENT:  No difficulty hearing, tinnitus, vertigo; No sinus or throat pain  NECK: No pain or stiffness  RESPIRATORY: No cough, wheezing, chills or hemoptysis; decrease  Shortness of Breath  CARDIOVASCULAR: No chest pain, palpitations, passing out, dizziness, or leg swelling  GASTROINTESTINAL: No abdominal or epigastric pain. No nausea, vomiting, or hematemesis; No diarrhea or constipation. No melena or hematochezia.  GENITOURINARY: No dysuria, frequency, hematuria, or incontinence  NEUROLOGICAL: No headaches, memory loss, loss of strength, numbness, or tremors  SKIN: No itching, burning, rashes, or lesions   LYMPH Nodes: No enlarged glands  ENDOCRINE: No heat or cold intolerance; No hair loss  MUSCULOSKELETAL: No joint pain or swelling; No muscle, back, or extremity pain  PSYCHIATRIC: No depression, anxiety, mood swings, or difficulty sleeping  HEME/LYMPH: No easy bruising, or bleeding gums  ALLERGY AND IMMUNOLOGIC: No hives or eczema	    [x ] All others negative	  [ ] Unable to obtain    PHYSICAL EXAM:  T(C): 36.8 (04-12-23 @ 04:35), Max: 36.8 (04-12-23 @ 04:35)  HR: 80 (04-12-23 @ 04:35) (54 - 87)  BP: 107/76 (04-12-23 @ 04:35) (96/60 - 108/65)  RR: 18 (04-12-23 @ 04:35) (17 - 18)  SpO2: 95% (04-12-23 @ 04:35) (94% - 99%)  Wt(kg): --  I&O's Summary    11 Apr 2023 07:01  -  12 Apr 2023 07:00  --------------------------------------------------------  IN: 420 mL / OUT: 530 mL / NET: -110 mL        Appearance: Normal	  HEENT:   Normal oral mucosa, PERRL, EOMI	  Lymphatic: No lymphadenopathy  Cardiovascular: Normal S1 S2, No JVD, + murmurs, + edema  Respiratory:rhonchi  Psychiatry: A & O x 3, Mood & affect appropriate  Gastrointestinal:  Soft, Non-tender, + BS	  Skin: No rashes, No ecchymoses, No cyanosis	  Neurologic: Non-focal  Extremities: Normal range of motion, No clubbing, cyanosis , + edema  Vascular: Peripheral pulses palpable 2+ bilaterally    MEDICATIONS  (STANDING):  atorvastatin 40 milliGRAM(s) Oral at bedtime  buMETAnide Injectable 2 milliGRAM(s) IV Push two times a day  chlorhexidine 4% Liquid 1 Application(s) Topical <User Schedule>  heparin  Infusion 1500 Unit(s)/Hr (16 mL/Hr) IV Continuous <Continuous>  levothyroxine 137 MICROGram(s) Oral daily  metoprolol succinate  milliGRAM(s) Oral daily  polyethylene glycol 3350 17 Gram(s) Oral daily  senna 2 Tablet(s) Oral at bedtime      TELEMETRY: 	    ECG:  	  RADIOLOGY:  OTHER: 	  	  LABS:	 	    CARDIAC MARKERS:                                8.3    8.64  )-----------( 227      ( 12 Apr 2023 06:46 )             25.1     04-12    134<L>  |  93<L>  |  55<H>  ----------------------------<  98  4.1   |  27  |  5.52<H>    Ca    8.2<L>      12 Apr 2023 06:47  Phos  7.6     04-11      proBNP:   Lipid Profile:   HgA1c:   TSH:   PTT - ( 12 Apr 2023 06:47 )  PTT:35.3 sec    Vancomycin Level, Random (04.11.23 @ 07:08)    Vancomycin Level, Random: 14.3: The "VANCOMYCIN, RANDOM" test should only be ordered for patients with  severe renal dysfunction or on dialysis. Therapeutic ranges have not been  established and results must be interpreted in the context of patient's  clinical condition.  NOTE: Therapeutic range for Trough Vancomycin is 10-20 mcg/mL. ug/mL      Assessment and plan  ---------------------------  77y m pmh BPH, Chronic venous insufficiency, HLD Hypothyroidism, S/P CABG x 2, S/P mitral valve replacement, SP Status post angioplasty, Sp TKR rt, Anasarca Pt comes to ed c/o shortness of breath for past month w progressive worsening of anasarca w swelling of scrotum and diff urinating, Has been noncompliant on diuretics for past two weeks w 20lb weight gain over past month. No fever and chills, sputum, nvdc, cp. PE Adult male lying stretcher w maked swelling to josh lower extr, w pitting edema,  chest  scant josh crackles w slight wheeze and prolonged exp phase  chf acute on chronic sec to RV dysfunction  tele, AFib hr controlled  will adjust cardiac meds  +BC i bottle, MSSA , s/p ppm explant  s/p PPM and LEAD extraction  oob too chair  PICC line needs 6 weeks of abx  acute on chronic renal failure/ renal appreciated  gentle hydration, repeat lytes in the afternoon, plan discussed with ACP  acute renal failure/ hematuria/ac was held awaiting renal eval/urology called awaiting ct abdomen and pelvis has ordered since last night awaiting results  beta blocker sed to rate control and hx of cad, s/p CABG  no further hematuria, start iv heparin low dose observe closely so far so good  on HD ?need of bumex  renal biopsy as per renal   vanco level noted

## 2023-04-12 NOTE — CONSULT NOTE ADULT - ASSESSMENT
Interventional Radiology    Evaluate for Procedure: random renal biopsy     HPI: 77y Male with BEULAH, Differential also includes AIN perhaps in setting of Cefazolin? vs overdiuresis. IR consulted for random renal biopsy.     Allergies: alfuzosin (Hives)  levofloxacin (Hives)  Myrbetriq (Hives)  tamsulosin (Hives)    Medications (Abx/Cardiac/Anticoagulation/Blood Products)    buMETAnide Injectable: 2 milliGRAM(s) IV Push (04-12 @ 13:50)  heparin  Infusion: 16 mL/Hr IV Continuous (04-10 @ 18:22)  heparin  Infusion: 14 mL/Hr IV Continuous (04-11 @ 12:04)  heparin  Infusion: 16 mL/Hr IV Continuous (04-12 @ 09:44)  metoprolol succinate ER: 100 milliGRAM(s) Oral (04-12 @ 05:10)  vancomycin  IVPB: 250 mL/Hr IV Intermittent (04-12 @ 08:14)    Data:    T(C): 36.3  HR: 80  BP: 109/62  RR: 18  SpO2: 92%    -WBC 8.64 / HgB 8.3 / Hct 25.1 / Plt 227  -Na 134 / Cl 93 / BUN 55 / Glucose 98  -K 4.1 / CO2 27 / Cr 5.52  -ALT -- / Alk Phos -- / T.Bili --  -INR -- / PTT 35.3    COVID-19 PCR: NotDetec (04-09-23 @ 20:53)        Radiology: < from: CT Abdomen and Pelvis No Cont (04.04.23 @ 22:40) >  KIDNEYS/URETERS: Horseshoe kidney with anunchanged 4 mm nonobstructing   calculus within the left moiety. Additional nonobstructing calculi are   noted within the right moiety. There is slight interval increase in   proximal periureteral inflammatory fat stranding without suggestion of   hydronephrosis.    < end of copied text >      Assessment/Plan:  77y Male with BEULAH, Differential also includes AIN perhaps in setting of Cefazolin? vs overdiuresis. IR consulted for random renal biopsy clarified with Dr. gross.  Last dose of ASA in 4/9.  MICRA placement on 3/30.  UA positive 4/6    - Will plan for procedure on 4/14  - Place order Alexis.   - Case and Images reviewed with Dr. Hutchins.  - Repeat UA   - NEED COVID TEST WITHIN  5 DAYS OF PLANNED PROCEDURE.  - Pt needs to be NPO AMN.   - Please Continue to hold A/c.  - Maintain SBp < 150.   - Will need to hold heparin drip for procedure. Call in AM on 4/14 to coordinate timing.   - Needs STAT CBC, BMP, Coags, TYPE and Screen in AM.

## 2023-04-12 NOTE — PROGRESS NOTE ADULT - PROBLEM SELECTOR PLAN 1
Pt. with oliguric BEULAH. Differential also includes AIN perhaps in setting of Cefazolin? vs overdiuresis. On review of [facility] pt noted to have a SCr of 1 on admission which since 4/1 has progressively increased to 7 most recently. UA with blood (suspected from delgadillo catheter insertion) and also proteinuria of 1.0g (initial elevation likely from gross hematuria and over-estimated).   Thus far JAGRUTI positive with homogenous pattern,   ANCA neg, complement levels not low, HBsAg neg, Hep C antibody neg, serum immunofixation with weak IgA lambda band  Renal sonogram demonstrating a horseshoe kidney without evidence of hydronephrosis or renal calculi.  Pt underwent first session HD on 4/10 via RIJ nontunneled HD catheter  Pt clinically volume overloaded; and remains oliguric at this time despite being on IV diuretics.   Plan for third session HD today.  Discussed with ID and primary team we will move forward with obtaining a kidney biopsy; last dose ASA 4/9 AM  Currently on heparin infusion  Monitor labs and urine output. Avoid nephrotoxins. Dose medications as per eGFR.

## 2023-04-12 NOTE — PROGRESS NOTE ADULT - ATTENDING COMMENTS
mssa bacteremia, ppm explant  # peter- horsehoe kidney  atn vs ain vs other  bun.cr uptrended and oliguric; started on hd; plan hd #3 today  proteinuria/hematuria; repeat prot/cr now prot/cr 1.0   serologic GN workup neg ; immunofixation Weak IgA Lambda Band Identified; spep beta haley protein; please check serum free light chains  abx changed to vanco; dose by level   monitor UO  still no sign of any renal improvement  if no signs of renal recovery plan for renal biopsy early next week  DC ASA  - last dose 4/9, and please call IR to schedule biopsy of horseshoe kidney; will need to stop and restart heparin approp to biopsy schedule;    # volume overload  Echo,  ef  25,  mod  AI.  severe  TR  + LE edema - bumex 2mg IV bid  #hyperkalemia- k stable; monitor trend;   #hyponatremia; monitor trend; stable  #access- has jamilah for hd

## 2023-04-13 LAB
ANION GAP SERPL CALC-SCNC: 12 MMOL/L — SIGNIFICANT CHANGE UP (ref 5–17)
APPEARANCE UR: ABNORMAL
APTT BLD: 46.7 SEC — HIGH (ref 27.5–35.5)
APTT BLD: 48.3 SEC — HIGH (ref 27.5–35.5)
APTT BLD: 53.5 SEC — HIGH (ref 27.5–35.5)
BACTERIA # UR AUTO: NEGATIVE — SIGNIFICANT CHANGE UP
BILIRUB UR-MCNC: NEGATIVE — SIGNIFICANT CHANGE UP
BUN SERPL-MCNC: 36 MG/DL — HIGH (ref 7–23)
CALCIUM SERPL-MCNC: 8.1 MG/DL — LOW (ref 8.4–10.5)
CHLORIDE SERPL-SCNC: 93 MMOL/L — LOW (ref 96–108)
CO2 SERPL-SCNC: 28 MMOL/L — SIGNIFICANT CHANGE UP (ref 22–31)
COLOR SPEC: SIGNIFICANT CHANGE UP
CREAT SERPL-MCNC: 4.54 MG/DL — HIGH (ref 0.5–1.3)
DIFF PNL FLD: ABNORMAL
EGFR: 13 ML/MIN/1.73M2 — LOW
EPI CELLS # UR: 0 — SIGNIFICANT CHANGE UP
GLUCOSE SERPL-MCNC: 114 MG/DL — HIGH (ref 70–99)
GLUCOSE UR QL: ABNORMAL
HCT VFR BLD CALC: 25 % — LOW (ref 39–50)
HCT VFR BLD CALC: 25.4 % — LOW (ref 39–50)
HGB BLD-MCNC: 8.3 G/DL — LOW (ref 13–17)
HGB BLD-MCNC: 8.3 G/DL — LOW (ref 13–17)
HYALINE CASTS # UR AUTO: ABNORMAL /LPF
KETONES UR-MCNC: SIGNIFICANT CHANGE UP
LEUKOCYTE ESTERASE UR-ACNC: ABNORMAL
MCHC RBC-ENTMCNC: 29.6 PG — SIGNIFICANT CHANGE UP (ref 27–34)
MCHC RBC-ENTMCNC: 29.9 PG — SIGNIFICANT CHANGE UP (ref 27–34)
MCHC RBC-ENTMCNC: 32.7 GM/DL — SIGNIFICANT CHANGE UP (ref 32–36)
MCHC RBC-ENTMCNC: 33.2 GM/DL — SIGNIFICANT CHANGE UP (ref 32–36)
MCV RBC AUTO: 89.9 FL — SIGNIFICANT CHANGE UP (ref 80–100)
MCV RBC AUTO: 90.7 FL — SIGNIFICANT CHANGE UP (ref 80–100)
NITRITE UR-MCNC: NEGATIVE — SIGNIFICANT CHANGE UP
NRBC # BLD: 0 /100 WBCS — SIGNIFICANT CHANGE UP (ref 0–0)
NRBC # BLD: 0 /100 WBCS — SIGNIFICANT CHANGE UP (ref 0–0)
PH UR: 6.5 — SIGNIFICANT CHANGE UP (ref 5–8)
PLATELET # BLD AUTO: 192 K/UL — SIGNIFICANT CHANGE UP (ref 150–400)
PLATELET # BLD AUTO: 201 K/UL — SIGNIFICANT CHANGE UP (ref 150–400)
POTASSIUM SERPL-MCNC: 3.7 MMOL/L — SIGNIFICANT CHANGE UP (ref 3.5–5.3)
POTASSIUM SERPL-SCNC: 3.7 MMOL/L — SIGNIFICANT CHANGE UP (ref 3.5–5.3)
PROT UR-MCNC: ABNORMAL
RBC # BLD: 2.78 M/UL — LOW (ref 4.2–5.8)
RBC # BLD: 2.8 M/UL — LOW (ref 4.2–5.8)
RBC # FLD: 14.2 % — SIGNIFICANT CHANGE UP (ref 10.3–14.5)
RBC # FLD: 14.4 % — SIGNIFICANT CHANGE UP (ref 10.3–14.5)
RBC CASTS # UR COMP ASSIST: >50 /HPF — SIGNIFICANT CHANGE UP (ref 0–4)
SARS-COV-2 RNA SPEC QL NAA+PROBE: SIGNIFICANT CHANGE UP
SODIUM SERPL-SCNC: 133 MMOL/L — LOW (ref 135–145)
SP GR SPEC: 1.03 — HIGH (ref 1.01–1.02)
UROBILINOGEN FLD QL: NEGATIVE — SIGNIFICANT CHANGE UP
VANCOMYCIN FLD-MCNC: 13 UG/ML — SIGNIFICANT CHANGE UP
WBC # BLD: 12.1 K/UL — HIGH (ref 3.8–10.5)
WBC # BLD: 12.96 K/UL — HIGH (ref 3.8–10.5)
WBC # FLD AUTO: 12.1 K/UL — HIGH (ref 3.8–10.5)
WBC # FLD AUTO: 12.96 K/UL — HIGH (ref 3.8–10.5)
WBC UR QL: SIGNIFICANT CHANGE UP

## 2023-04-13 RX ORDER — VANCOMYCIN HCL 1 G
750 VIAL (EA) INTRAVENOUS ONCE
Refills: 0 | Status: DISCONTINUED | OUTPATIENT
Start: 2023-04-13 | End: 2023-04-13

## 2023-04-13 RX ORDER — VANCOMYCIN HCL 1 G
750 VIAL (EA) INTRAVENOUS ONCE
Refills: 0 | Status: COMPLETED | OUTPATIENT
Start: 2023-04-13 | End: 2023-04-13

## 2023-04-13 RX ADMIN — Medication 250 MILLIGRAM(S): at 15:41

## 2023-04-13 RX ADMIN — HEPARIN SODIUM 17 UNIT(S)/HR: 5000 INJECTION INTRAVENOUS; SUBCUTANEOUS at 11:02

## 2023-04-13 RX ADMIN — CHLORHEXIDINE GLUCONATE 1 APPLICATION(S): 213 SOLUTION TOPICAL at 05:46

## 2023-04-13 RX ADMIN — Medication 100 MILLIGRAM(S): at 05:46

## 2023-04-13 RX ADMIN — HEPARIN SODIUM 17 UNIT(S)/HR: 5000 INJECTION INTRAVENOUS; SUBCUTANEOUS at 03:40

## 2023-04-13 RX ADMIN — POLYETHYLENE GLYCOL 3350 17 GRAM(S): 17 POWDER, FOR SOLUTION ORAL at 21:34

## 2023-04-13 RX ADMIN — BUMETANIDE 2 MILLIGRAM(S): 0.25 INJECTION INTRAMUSCULAR; INTRAVENOUS at 05:46

## 2023-04-13 RX ADMIN — Medication 137 MICROGRAM(S): at 05:46

## 2023-04-13 RX ADMIN — ATORVASTATIN CALCIUM 40 MILLIGRAM(S): 80 TABLET, FILM COATED ORAL at 21:35

## 2023-04-13 RX ADMIN — SENNA PLUS 2 TABLET(S): 8.6 TABLET ORAL at 21:35

## 2023-04-13 RX ADMIN — BUMETANIDE 2 MILLIGRAM(S): 0.25 INJECTION INTRAMUSCULAR; INTRAVENOUS at 13:22

## 2023-04-13 RX ADMIN — HEPARIN SODIUM 16.5 UNIT(S)/HR: 5000 INJECTION INTRAVENOUS; SUBCUTANEOUS at 01:49

## 2023-04-13 NOTE — PROGRESS NOTE ADULT - ASSESSMENT
77  year old male    h/o  CAD s/p stent , asa.      A-fib/ PPM, , hypothyroid, and total left knee replacement    prior  PPM  interrogation  with  [facility]/   s/p  brief   bursts  of  afib/ , on prior visit   Ct chest, retrosternal hematoma.  mod left pl effusion    was  on bumex/  aldactone        *    admitted   for  worsening   pedal  edema          component  of  cellulitis   and  from  c/c  diastolic  chf,/  h/o  l/edema  of L  leg         pt  admits to  being  non complaint with  meds        *   h/o    c/c AFIB,   has  PPM         on eliquis     *    Anemia, , hb stable, had  been seen by  gi  eval dr joann sanchez in past     *    CAD,     cath, with 2 vessel disease,  s/p  CABG and  MVR  surg d r marni   *      h/o  Afib on    eliquis          c/c leg redness/  4 +  edema, on keflex, has  improved    *     echo,  on 7/2022,  ef  35/ new/ severe  TR          cath,   was non  obstructive    *    on synthroid          on asa/  torpol/  eliquis          crt  noted,   from  lasix// aldactone.  leg  swelling  is  lessening   *   MSSA  bacterinia           was  on iv ancef.  ,  rpt bcx    are  negative          explant pf  ppm  and  Micra  placement on  3/30/23. dr sunitha rocha   *    Echo,  ef  25,  mod  AI.  severe  TR        PICC  line  and  extended  course  of  ab.         bumex./ aldactone stopped/        BEULAH,   seen  by  house renal      ?  AIN,  hence   ab was  switched   to iv vanco,  per  ID        follow  vanco  level  and dose  accordingly      on  iv heparin/  Afib      crt   was  9,9, now is  4/5  and  continuing to  dcerease/  bmp  in am         card /  dr mikayla dick         rad< from: TTE with Doppler (w/Cont) (07.05.22 @ 09:42) >  Conclusions:  Endocardial visualization enhanced with intravenous  injection of Ultrasonic Enhancing Agent (Definity).  Severe left ventricular enlargement.  Estimated ejection fraction 35%. Diffuse hypokinesis, with  inferior akinesis.  Bioprosthetic mitral valve with normal function.  Right ventricular enlargement with decreased right  ventricular systolic function.  A device wire is noted in the right heart.  ------------------------------------------------------------------------  Confirmed on  7/5/2022 - 12:16:10 by Kristian    < end of copied text >

## 2023-04-13 NOTE — PROGRESS NOTE ADULT - SUBJECTIVE AND OBJECTIVE BOX
afebrile    REVIEW OF SYSTEMS:  CONSTITUTIONAL: No fever,  no  weight loss  ENT:  No  tinnitus,   no   vertigo  NECK: No pain or stiffness  RESPIRATORY: No cough, wheezing, chills or hemoptysis;    No Shortness of Breath  CARDIOVASCULAR: No chest pain, palpitations, dizziness  GASTROINTESTINAL: No abdominal or epigastric pain. No nausea, vomiting, or hematemesis; No diarrhea  No melena or hematochezia.  GENITOURINARY: No dysuria, frequency, hematuria, or incontinence  NEUROLOGICAL: No headaches  SKIN: No itching,  no   rash  LYMPH Nodes: No enlarged glands  ENDOCRINE: No heat or cold intolerance  MUSCULOSKELETAL: No joint pain or swelling  PSYCHIATRIC: No depression, anxiety  HEME/LYMPH: No easy bruising, or bleeding gums  ALLERGY AND IMMUNOLOGIC: No hives or eczema	    MEDICATIONS  (STANDING):  atorvastatin 40 milliGRAM(s) Oral at bedtime  buMETAnide Injectable 2 milliGRAM(s) IV Push two times a day  chlorhexidine 4% Liquid 1 Application(s) Topical <User Schedule>  heparin  Infusion 1500 Unit(s)/Hr (17 mL/Hr) IV Continuous <Continuous>  levothyroxine 137 MICROGram(s) Oral daily  metoprolol succinate  milliGRAM(s) Oral daily  polyethylene glycol 3350 17 Gram(s) Oral daily  senna 2 Tablet(s) Oral at bedtime    MEDICATIONS  (PRN):  acetaminophen     Tablet .. 650 milliGRAM(s) Oral every 6 hours PRN Mild Pain (1 - 3)  bisacodyl Suppository 10 milliGRAM(s) Rectal daily PRN Constipation  sodium chloride 0.9% lock flush 10 milliLiter(s) IV Push every 1 hour PRN Pre/post blood products, medications, blood draw, and to maintain line patency      Vital Signs Last 24 Hrs  T(C): 36.6 (2023 04:38), Max: 36.9 (2023 21:14)  T(F): 97.8 (2023 04:38), Max: 98.4 (2023 21:14)  HR: 93 (2023 04:38) (80 - 98)  BP: 100/56 (2023 04:38) (91/63 - 109/62)  BP(mean): --  RR: 18 (2023 04:38) (18 - 18)  SpO2: 90% (2023 04:38) (90% - 98%)    Parameters below as of 2023 04:38  Patient On (Oxygen Delivery Method): nasal cannula  O2 Flow (L/min): 1    CAPILLARY BLOOD GLUCOSE        I&O's Summary    2023 07:01  -  2023 07:00  --------------------------------------------------------  IN: 600 mL / OUT: 1650 mL / NET: -1050 mL          Appearance: Normal	  HEENT:   Normal oral mucosa, PERRL, EOMI	  Lymphatic: No lymphadenopathy  Cardiovascular: Normal S1 S2, No JVD  Respiratory: Lungs clear to auscultation	  Psychiatry: A & O x 3, Mood & affect appropriate  Gastrointestinal:  Soft, Non-tender, + BS	  Skin: No rash, No ecchymoses	  Extremities: Normal range of motion  Vascular: Peripheral pulses palpable bilaterally    LABS:                        8.3    12.10 )-----------( 201      ( 2023 07:54 )             25.0     04-13    133<L>  |  93<L>  |  36<H>  ----------------------------<  114<H>  3.7   |  28  |  4.54<H>    Ca    8.1<L>      2023 07:54      PTT - ( 2023 01:55 )  PTT:48.3 sec      Urinalysis Basic - ( 2023 07:54 )    Color: DARK BROWN / Appearance: Turbid / S.026 / pH: x  Gluc: x / Ketone: Trace  / Bili: Negative / Urobili: Negative   Blood: x / Protein: 300 mg/dL / Nitrite: Negative   Leuk Esterase: Large / RBC: >50 /hpf / WBC 3-5   Sq Epi: x / Non Sq Epi: x / Bacteria: Negative                      Consultant(s) Notes Reviewed:      Care Discussed with Consultants/Other Providers:

## 2023-04-13 NOTE — PROVIDER CONTACT NOTE (OTHER) - BACKGROUND
Pt comes to ed c/o shortness of breath for past month w progressive worsening of anasarca w swelling of scrotum and diff urinating,
Pt on heparin Gtt, Therapeutic x1. Urine was yellow in color this am.

## 2023-04-13 NOTE — CHART NOTE - NSCHARTNOTEFT_GEN_A_CORE
Notified by RN that patient has bright red urine in delgadillo   Patient seen and examined at bedside; denies any complaints   Delgadillo with red urine without clots, most recent UA from 4/13 with large blood  Discussed case with attending Dr. Christensen   Heparin drip on hold   Urology consulted   Will continue to monitor    Hilaria Jain PA-C  Dept of Medicine Notified by RN that patient has bright red urine in delgadlilo   Patient seen and examined at bedside; denies any complaints   Delgadillo with red urine without clots, most recent UA from 4/13 with large blood  Discussed case with attending Dr. Christensen   Heparin drip on hold   Urology consulted   Will continue to monitor    Hilaria Jain PA-C  Dept of Medicine    ADDENDUM:  Contacted by urology PA who reports that patient sees a private urologist who rounds at the hospital   House urology contacted patient's private urologist, who will come see the patient Notified by RN that patient has bright red urine in delgadillo   Patient seen and examined at bedside; denies any complaints   Delgadillo with red urine without clots, most recent UA from 4/13 with large blood  Discussed case with attending Dr. Christensen   Heparin drip on hold   Urology consulted   Will continue to monitor    Hilaria Jain PA-C  Dept of Medicine    ADDENDUM:  Contacted by urology PA who reports that patient sees a private urologist who rounds at the hospital   Patient's private urologist was contacted by house urology   Patient's private urologist to see him

## 2023-04-13 NOTE — PROGRESS NOTE ADULT - SUBJECTIVE AND OBJECTIVE BOX
Date of Service: 04-13-23 @ 08:56           CARDIOLOGY     PROGRESS  NOTE   ________________________________________________    CHIEF COMPLAINT:Patient is a 77y old  Male who presents with a chief complaint of Chart Reviewed, Events noted  " 77  year old male h/o  CAD s/p stent , asa. A-fib/ PPM, , hypothyroid, and total left knee replacement  prior  PPM  interrogation  with  [facility]/   s/p  brief   bursts  of  afib/ , on prior visit. Ct chest, retrosternal hematoma.  mod left pl effusion was  on bumex/  aldactone admitted with  worsening   pedal  edema  component  of  cellulitis   and  from  c/c  diastolic  chf,/  h/o  l/edema  of L  leg pt  admits to  being  non complaint with  meds"    no complain    	  REVIEW OF SYSTEMS:  CONSTITUTIONAL: No fever, weight loss, or fatigue  EYES: No eye pain, visual disturbances, or discharge  ENT:  No difficulty hearing, tinnitus, vertigo; No sinus or throat pain  NECK: No pain or stiffness  RESPIRATORY: No cough, wheezing, chills or hemoptysis; decrease  Shortness of Breath  CARDIOVASCULAR: No chest pain, palpitations, passing out, dizziness, or leg swelling  GASTROINTESTINAL: No abdominal or epigastric pain. No nausea, vomiting, or hematemesis; No diarrhea or constipation. No melena or hematochezia.  GENITOURINARY: No dysuria, frequency, hematuria, or incontinence  NEUROLOGICAL: No headaches, memory loss, loss of strength, numbness, or tremors  SKIN: No itching, burning, rashes, or lesions   LYMPH Nodes: No enlarged glands  ENDOCRINE: No heat or cold intolerance; No hair loss  MUSCULOSKELETAL: No joint pain or swelling; No muscle, back, or extremity pain  PSYCHIATRIC: No depression, anxiety, mood swings, or difficulty sleeping  HEME/LYMPH: No easy bruising, or bleeding gums  ALLERGY AND IMMUNOLOGIC: No hives or eczema	    [x ] All others negative	  [ ] Unable to obtain    PHYSICAL EXAM:  T(C): 36.6 (04-13-23 @ 04:38), Max: 36.9 (04-12-23 @ 21:14)  HR: 93 (04-13-23 @ 04:38) (80 - 98)  BP: 100/56 (04-13-23 @ 04:38) (91/63 - 109/62)  RR: 18 (04-13-23 @ 04:38) (18 - 18)  SpO2: 90% (04-13-23 @ 04:38) (90% - 98%)  Wt(kg): --  I&O's Summary    12 Apr 2023 07:01  -  13 Apr 2023 07:00  --------------------------------------------------------  IN: 600 mL / OUT: 1650 mL / NET: -1050 mL        Appearance: Normal	  HEENT:   Normal oral mucosa, PERRL, EOMI	  Lymphatic: No lymphadenopathy  Cardiovascular: Normal S1 S2, No JVD, + murmurs, + edema  Respiratory: rhonchi  Psychiatry: A & O x 3, Mood & affect appropriate  Gastrointestinal:  Soft, Non-tender, + BS	  Skin: No rashes, No ecchymoses, No cyanosis	  Neurologic: Non-focal  Extremities: Normal range of motion, No clubbing, cyanosis , + edema  Vascular: Peripheral pulses palpable 2+ bilaterally    MEDICATIONS  (STANDING):  atorvastatin 40 milliGRAM(s) Oral at bedtime  buMETAnide Injectable 2 milliGRAM(s) IV Push two times a day  chlorhexidine 4% Liquid 1 Application(s) Topical <User Schedule>  heparin  Infusion 1500 Unit(s)/Hr (17 mL/Hr) IV Continuous <Continuous>  levothyroxine 137 MICROGram(s) Oral daily  metoprolol succinate  milliGRAM(s) Oral daily  polyethylene glycol 3350 17 Gram(s) Oral daily  senna 2 Tablet(s) Oral at bedtime      TELEMETRY: 	    ECG:  	  RADIOLOGY:  OTHER: 	  	  LABS:	 	    CARDIAC MARKERS:                        8.3    12.10 )-----------( 201      ( 13 Apr 2023 07:54 )             25.0     04-13    133<L>  |  93<L>  |  36<H>  ----------------------------<  114<H>  3.7   |  28  |  4.54<H>    Ca    8.1<L>      13 Apr 2023 07:54      proBNP:   Lipid Profile:   HgA1c:   TSH:   PTT - ( 13 Apr 2023 01:55 )  PTT:48.3 sec  mssa bacteremia, ppm explant  # peter- horsehoe kidney  atn vs ain vs other  bun.cr uptrended and oliguric; started on hd; plan hd #3 today  proteinuria/hematuria; repeat prot/cr now prot/cr 1.0   serologic GN workup neg ; immunofixation Weak IgA Lambda Band Identified; spep beta haley protein; please check serum free light chains  abx changed to vanco; dose by level   monitor UO  still no sign of any renal improvement  if no signs of renal recovery plan for renal biopsy early next week  DC ASA  - last dose 4/9, and please call IR to schedule biopsy of horseshoe kidney; will need to stop and restart heparin approp to biopsy schedule;    # volume overload  Echo,  ef  25,  mod  AI.  severe  TR  + LE edema - bumex 2mg IV bid  #hyperkalemia- k stable; monitor trend;   #hyponatremia; monitor trend; stable  #access- has shiley for hd .    < from: TTE W or WO Ultrasound Enhancing Agent (03.29.23 @ 14:59) >   1. Mildly dilated left ventricular cavity size. The left ventricular wall thickness is normal. The left ventricular systolic function is moderately decreased with an ejection fraction visually estimated at 35 to 40 %.   2. The left ventricular diastolic function is indeterminate.   3. Normal right ventricular cavity size, normal right ventricular wall thickness and mildly reduced systolic function. The tricuspid annular plane systolic excursion (TAPSE) is 0.9 cm (normal >=1.7 cm).   4. The left atrium is severely dilated.   5. Bioprosthetic valve present in the mitral position. No prosthetic valvular regurgitation.   6. The aortic annulus and aortic root appear normal in size. The aortic root at sinuses of Valsalva is borderline dilated.   7. No pericardial effusion seen.   8. Unable to rule out endocarditis.   9. Compared to the transthoracic echocardiogram performed on 7/5/2022 there have been no significant interval changes.  10. Estimated pulmonary artery systolic pressure is 37 mmHg.  11. No echocardiographic evidence of vegetations.      Assessment and plan  ---------------------------  77y m pmh BPH, Chronic venous insufficiency, HLD Hypothyroidism, S/P CABG x 2, S/P mitral valve replacement, SP Status post angioplasty, Sp TKR rt, Anasarca Pt comes to ed c/o shortness of breath for past month w progressive worsening of anasarca w swelling of scrotum and diff urinating, Has been noncompliant on diuretics for past two weeks w 20lb weight gain over past month. No fever and chills, sputum, nvdc, cp. PE Adult male lying stretcher w maked swelling to josh lower extr, w pitting edema,  chest  scant josh crackles w slight wheeze and prolonged exp phase  chf acute on chronic sec to RV dysfunction  tele, AFib hr controlled  will adjust cardiac meds  +BC i bottle, MSSA , s/p ppm explant  s/p PPM and LEAD extraction  oob too chair  PICC line needs 6 weeks of abx  acute on chronic renal failure/ renal appreciated  gentle hydration, repeat lytes in the afternoon, plan discussed with ACP  acute renal failure/ hematuria/ac was held awaiting renal eval/urology called awaiting ct abdomen and pelvis has ordered since last night awaiting results  beta blocker sed to rate control and hx of cad, s/p CABG  no further hematuria, start iv heparin low dose observe closely so far so good  on HD ?need of bumex  renal biopsy as per renal   vanco level noted  cardiomyopathy can not tolerate ACE/ARB  a.fib hr is well controlled

## 2023-04-14 ENCOUNTER — RESULT REVIEW (OUTPATIENT)
Age: 78
End: 2023-04-14

## 2023-04-14 LAB
ANION GAP SERPL CALC-SCNC: 12 MMOL/L — SIGNIFICANT CHANGE UP (ref 5–17)
APTT BLD: 29 SEC — SIGNIFICANT CHANGE UP (ref 27.5–35.5)
BLD GP AB SCN SERPL QL: NEGATIVE — SIGNIFICANT CHANGE UP
BUN SERPL-MCNC: 48 MG/DL — HIGH (ref 7–23)
CALCIUM SERPL-MCNC: 8.2 MG/DL — LOW (ref 8.4–10.5)
CHLORIDE SERPL-SCNC: 92 MMOL/L — LOW (ref 96–108)
CO2 SERPL-SCNC: 27 MMOL/L — SIGNIFICANT CHANGE UP (ref 22–31)
CREAT SERPL-MCNC: 5.99 MG/DL — HIGH (ref 0.5–1.3)
EGFR: 9 ML/MIN/1.73M2 — LOW
GLUCOSE SERPL-MCNC: 116 MG/DL — HIGH (ref 70–99)
HCT VFR BLD CALC: 25 % — LOW (ref 39–50)
HGB BLD-MCNC: 8.2 G/DL — LOW (ref 13–17)
INR BLD: 1.34 RATIO — HIGH (ref 0.88–1.16)
MCHC RBC-ENTMCNC: 30 PG — SIGNIFICANT CHANGE UP (ref 27–34)
MCHC RBC-ENTMCNC: 32.8 GM/DL — SIGNIFICANT CHANGE UP (ref 32–36)
MCV RBC AUTO: 91.6 FL — SIGNIFICANT CHANGE UP (ref 80–100)
NRBC # BLD: 0 /100 WBCS — SIGNIFICANT CHANGE UP (ref 0–0)
PLATELET # BLD AUTO: 174 K/UL — SIGNIFICANT CHANGE UP (ref 150–400)
POTASSIUM SERPL-MCNC: 3.9 MMOL/L — SIGNIFICANT CHANGE UP (ref 3.5–5.3)
POTASSIUM SERPL-SCNC: 3.9 MMOL/L — SIGNIFICANT CHANGE UP (ref 3.5–5.3)
PROTHROM AB SERPL-ACNC: 15.4 SEC — HIGH (ref 10.5–13.4)
RBC # BLD: 2.73 M/UL — LOW (ref 4.2–5.8)
RBC # FLD: 14.5 % — SIGNIFICANT CHANGE UP (ref 10.3–14.5)
RH IG SCN BLD-IMP: NEGATIVE — SIGNIFICANT CHANGE UP
SODIUM SERPL-SCNC: 131 MMOL/L — LOW (ref 135–145)
VANCOMYCIN FLD-MCNC: 21.3 UG/ML — SIGNIFICANT CHANGE UP
WBC # BLD: 15.62 K/UL — HIGH (ref 3.8–10.5)
WBC # FLD AUTO: 15.62 K/UL — HIGH (ref 3.8–10.5)

## 2023-04-14 PROCEDURE — 99232 SBSQ HOSP IP/OBS MODERATE 35: CPT

## 2023-04-14 PROCEDURE — 76942 ECHO GUIDE FOR BIOPSY: CPT | Mod: 26

## 2023-04-14 PROCEDURE — 99233 SBSQ HOSP IP/OBS HIGH 50: CPT | Mod: GC

## 2023-04-14 PROCEDURE — 88348 ELECTRON MICROSCOPY DX: CPT | Mod: 26

## 2023-04-14 PROCEDURE — 88313 SPECIAL STAINS GROUP 2: CPT | Mod: 26

## 2023-04-14 PROCEDURE — 88346 IMFLUOR 1ST 1ANTB STAIN PX: CPT | Mod: 26

## 2023-04-14 PROCEDURE — 88305 TISSUE EXAM BY PATHOLOGIST: CPT | Mod: 26

## 2023-04-14 PROCEDURE — 50200 RENAL BIOPSY PERQ: CPT | Mod: LT

## 2023-04-14 PROCEDURE — 88350 IMFLUOR EA ADDL 1ANTB STN PX: CPT | Mod: 26

## 2023-04-14 RX ADMIN — Medication 100 MILLIGRAM(S): at 05:32

## 2023-04-14 RX ADMIN — CHLORHEXIDINE GLUCONATE 1 APPLICATION(S): 213 SOLUTION TOPICAL at 05:45

## 2023-04-14 RX ADMIN — BUMETANIDE 2 MILLIGRAM(S): 0.25 INJECTION INTRAMUSCULAR; INTRAVENOUS at 19:27

## 2023-04-14 RX ADMIN — BUMETANIDE 2 MILLIGRAM(S): 0.25 INJECTION INTRAMUSCULAR; INTRAVENOUS at 05:32

## 2023-04-14 RX ADMIN — Medication 137 MICROGRAM(S): at 05:32

## 2023-04-14 NOTE — PROGRESS NOTE ADULT - ATTENDING COMMENTS
mssa bacteremia, ppm explant  # peter- horsehoe kidney  atn vs ain vs post infectious etiology vs other  proteinuria/hematuria; repeat prot/cr now prot/cr 1.0   bun.cr uptrended and pt remained oliguric; started on hd;    serologic GN workup neg ; immunofixation Weak IgA Lambda Band Identified; spep beta haley protein; please check serum free light chains  abx changed to vanco; dosed by level   monitor UO  still no sign of any renal improvement  renal biopsy planned today    #HD MWF- plan HD later today   # volume overload  Echo,  ef  25,  mod  AI.  severe  TR  + LE edema - bumex 2mg IV bid  UF with HD  #hyperkalemia- k stable; monitor trend;   #hyponatremia; monitor trend; stable  #access- has jamilah for hd

## 2023-04-14 NOTE — PROGRESS NOTE ADULT - ASSESSMENT
78 y/o male PMHx HTN, HLD, CAD s/p stent on ASA, A-fib, hypothyroid, CHF now presenting to the ED worsening SOB, DUNCAN, peripheral edema, scrotal edema and 20 pound unintentional weight gain over last month with MSSA bacteremia, HF    Kevin Olmos  Attending Physician   Division of Infectious Disease  Office #[phone]  Available on Microsoft Teams also  After 5pm/weekend or no response, call #[phone]

## 2023-04-14 NOTE — PROGRESS NOTE ADULT - SUBJECTIVE AND OBJECTIVE BOX
{\rtf1\etqxfb31208\ansi\yrzrscf3971\ftnbj\uc1\deff0  {\fonttbl{\f0 \fnil Segoe UI;}{\f1 \fnil \fcharset0 Segoe UI;}{\f2 \fnil [employer] [place];}}  {\colortbl ;\rmf358\pknsl771\hlzs823 ;\red0\green0\blue0 ;\red0\green0\pvhq283 ;\red0\green0\blue0 ;}  {\stylesheet{\f0\fs20 Normal;}{\cs1 Default Paragraph Font;}{\cs2\f0\fs16 Line Number;}{\cs3\f2\fs24\ul\cf3 Hyperlink;}}  {\*\revtbl{Unknown;}}  \dvyzcj15158\tajmti06649\lxgei4783\awvfu0071\nkfgx2408\hvpfv2390\ignlxkr974\vhpswoo736\nogrowautofit\lhbekg350\formshade\nofeaturethrottle1\dntblnsbdb\fet4\aendnotes\aftnnrlc\pgbrdrhead\pgbrdrfoot  \sectd\imzzse93336\xtyqys71816\guttersxn0\bhuuefse3021\fqoitfqc4570\wuhifgat7046\yladohkw6321\tssdjbt320\erxwsjt504\sbkpage\pgncont\pgndec  \plain\plain\f0\fs24\pard\plain\f0\fs24\plain\f0\fs20\qspc2974\hich\f0\dbch\f0\loch\f0\fs20 Date of Service: 04-14-23 @ 04:30\par           CARDIOLOGY     PROGRESS  NOTE \par  ________________________________________________\par  \par  CHIEF COMPLAINT:Patient is a 77y old  Male who presents with a chief complaint of Chart Reviewed, Events noted\par  " 77  year old male h/o  CAD s/p stent , asa. A-fib/ PPM, , hypothyroid, and total left knee replacement  prior  PPM  interrogation  with  [facility]/   s/p  brief   bursts  of  afib/ , on prior visit. Ct chest, retrosternal hematoma.  mod left pl effusion was    on bumex/  aldactone admitted with  worsening   pedal  edema  component  of  cellulitis   and  from  c/c  diastolic  chf,/  h/o  l/edema  of L  leg pt  admits to  being  non complaint with  meds" \par  no complain/ hematuria\par  \par  \tab\par  REVIEW OF SYSTEMS:\par  CONSTITUTIONAL: No fever, weight loss, or fatigue\par  EYES: No eye pain, visual disturbances, or discharge\par  ENT:  No difficulty hearing, tinnitus, vertigo; No sinus or throat pain\par  NECK: No pain or stiffness\par  RESPIRATORY: No cough, wheezing, chills or hemoptysis; No Shortness of Breath\par  CARDIOVASCULAR: No chest pain, palpitations, passing out, dizziness, or leg swelling\par  GASTROINTESTINAL: No abdominal or epigastric pain. No nausea, vomiting, or hematemesis; No diarrhea or constipation. No melena or hematochezia.\par  GENITOURINARY: No dysuria, frequency, +hematuria \par  NEUROLOGICAL: No headaches, memory loss, loss of strength, numbness, or tremors\par  SKIN: No itching, burning, rashes, or lesions \par  LYMPH Nodes: No enlarged glands\par  ENDOCRINE: No heat or cold intolerance; No hair loss\par  MUSCULOSKELETAL: No joint pain or swelling; No muscle, back, or extremity pain\par  PSYCHIATRIC: No depression, anxiety, mood swings, or difficulty sleeping\par  HEME/LYMPH: No easy bruising, or bleeding gums\par  ALLERGY AND IMMUNOLOGIC: No hives or eczema\tab\par  \par  [x ] All others negative\tab\par  [ ] Unable to obtain\par  \par  PHYSICAL EXAM:\par  T(C): 36.8 (04-14-23 @ 04:17), Max: 36.8 (04-14-23 @ 00:21)\par  HR: 87 (04-14-23 @ 04:17) (70 - 111)\par  BP: 101/57 (04-14-23 @ 04:17) (90/54 - 111/63)\par  RR: 18 (04-14-23 @ 04:17) (18 - 18)\par  SpO2: 91% (04-14-23 @ 04:17) (90% - 92%)\par  Wt(kg): --\par  I&O's Summary\par  \par  12 Apr 2023 07:01  -  13 Apr 2023 07:00\par  --------------------------------------------------------\par  IN: 600 mL / OUT: 1650 mL / NET: -1050 mL\par  \par  13 Apr 2023 07:01  -  14 Apr 2023 04:30\par  --------------------------------------------------------\par  IN: 220 mL / OUT: 50 mL / NET: 170 mL\par  \par  \par  \par  Appearance: Normal\tab\par  HEENT:   Normal oral mucosa, PERRL, EOMI\tab\par  Lymphatic: No lymphadenopathy\par  Cardiovascular: Normal S1 S2, No JVD, + murmurs, No edema\par  Respiratory: rhonchi\par  Psychiatry: A & O x 3, Mood & affect appropriate\par  Gastrointestinal:  Soft, Non-tender, + BS\tab\par  Skin: No rashes, No ecchymoses, No cyanosis\tab\par  Neurologic: Non-focal\par  Extremities: Normal range of motion, No clubbing, cyanosis or edema\par  Vascular: Peripheral pulses palpable 2+ bilaterally\par  \par  MEDICATIONS  (STANDING):\par  atorvastatin 40 milliGRAM(s) Oral at bedtime\par  buMETAnide Injectable 2 milliGRAM(s) IV Push two times a day\par  chlorhexidine 4% Liquid 1 Application(s) Topical <User Schedule>\par  levothyroxine 137 MICROGram(s) Oral daily\par  metoprolol succinate  milliGRAM(s) Oral daily\par  polyethylene glycol 3350 17 Gram(s) Oral daily\par  senna 2 Tablet(s) Oral at bedtime\par  \par  \par  TELEMETRY: \tab   \par  ECG:  \tab\par  RADIOLOGY:\par  OTHER: \tab\par  \tab\par  LABS:\tab  \tab\par  \par  CARDIAC MARKERS:\par  \par  \par  \par  \par           \par             8.3  \par  12.96 )-----------( 192      ( 13 Apr 2023 10:34 )\par             25.4 \par  \par  04-13\par  \par  133<L>  |  93<L>  |  36<H>\par  ----------------------------<  114<H>\par  3.7   |  28  |  4.54<H>\par  \par  Ca    8.1<L>      13 Apr 2023 07:54\par  \par  \par  proBNP: \par  Lipid Profile: \par  HgA1c: \par  TSH: \par  PTT - ( 13 Apr 2023 17:57 )  PTT:46.7 sec\par  \par  \ql\plain\f0\fs24\plain\f0\fs20\iobz5593\hich\f0\dbch\f0\loch\f0\fs20 Contacted by urology PA who reports that patient sees a private urologist who rounds at the hospital \par  Patient's private urologist was contacted by [facility] \par  Patient's private urologist to see him.\plain\f1\fs20\ncul1751\hich\f1\dbch\f1\loch\f1\cf2\fs20\strike\plain\f1\fs20\uaet5168\hich\f1\dbch\f1\loch\f1\cf2\fs20\plain\f0\fs20\aita9695\hich\f0\dbch\f0\loch\f0\fs20\par  \pard\plain\f0\fs24\plain\f0\fs20\hqju1728\hich\f0\dbch\f0\loch\f0\fs20\par  Assessment and plan\par  ---------------------------\par  \ql\plain\f0\fs24\plain\f1\fs16\dzae9151\hich\f1\dbch\f1\loch\f1\cf2\fs16 77y m pmh BPH, Chronic venous insufficiency, HLD Hypothyroidism, S/P CABG x 2, S/P mitral valve replacement, SP Status post angioplasty, Sp TKR rt, Anasarca Pt comes to ed c/o shortness   of breath for past month w progressive worsening of anasarca w swelling of scrotum and diff urinating, Has been noncompliant on diuretics for past two weeks w 20lb weight gain over past month. No fever and chills, sputum, nvdc, cp. PE Adult male lying   stretcher w maked swelling to josh lower extr, w pitting edema,  chest  scant josh crackles w slight wheeze and prolonged exp phase\par  chf acute on chronic sec to RV dysfunction\par  tele, AFib hr controlled\par  will adjust cardiac meds\par  +BC i bottle, MSSA , s/p ppm explant\par  s/p PPM and LEAD extraction\par  oob too chair\par  PICC line needs 6 weeks of abx\par  acute on chronic renal failure/ renal appreciated\par  gentle hydration, repeat lytes in the afternoon, plan discussed with ACP\par  acute renal failure/ hematuria/ac was held awaiting renal eval/urology called awaiting ct abdomen and pelvis has ordered since last night awaiting results\par  beta blocker sed to rate control and hx of cad, s/p CABG\par  no further hematuria, start iv heparin low dose observe closely so far so good\par  on HD ?need of bumex\par  renal biopsy as per renal \par  vanco level noted\par  cardiomyopathy can not tolerate ACE/ARB\par  a.fib hr is well controlled\par  'hematuria, ac is held urology called, fu cbc closely discussed with ACP\par  nephrology  recommendations\plain\f0\fs20\vgbi5130\hich\f0\dbch\f0\loch\f0\fs20\par  \pard\plain\f0\fs24\plain\f0\fs20\buvp2440\hich\f0\dbch\f0\loch\f0\fs20\par  \tab\par  \par  \par  \par  \ql\plain\f0\fs24\plain\f0\fs20\bnki4939\hich\f0\dbch\f0\loch\f0\fs20\par  }   Date of Service: 04-14-23 @ 04:30           CARDIOLOGY     PROGRESS  NOTE   ________________________________________________    CHIEF COMPLAINT:Patient is a 77y old  Male who presents with a chief complaint of Chart Reviewed, Events noted  " 77  year old male h/o  CAD s/p stent , asa. A-fib/ PPM, , hypothyroid, and total left knee replacement  prior  PPM  interrogation  with  [facility]CT/   s/p  brief   bursts  of  afib/ , on prior visit. Ct chest, retrosternal hematoma.  mod left pl effusion was  on bumex/  aldactone admitted with  worsening   pedal  edema  component  of  cellulitis   and  from  c/c  diastolic  chf,/  h/o  l/edema  of L  leg pt  admits to  being  non complaint with  meds"   no complain/ hematuria    	  REVIEW OF SYSTEMS:  CONSTITUTIONAL: No fever, weight loss, or fatigue  EYES: No eye pain, visual disturbances, or discharge  ENT:  No difficulty hearing, tinnitus, vertigo; No sinus or throat pain  NECK: No pain or stiffness  RESPIRATORY: No cough, wheezing, chills or hemoptysis; No Shortness of Breath  CARDIOVASCULAR: No chest pain, palpitations, passing out, dizziness, or leg swelling  GASTROINTESTINAL: No abdominal or epigastric pain. No nausea, vomiting, or hematemesis; No diarrhea or constipation. No melena or hematochezia.  GENITOURINARY: No dysuria, frequency, +hematuria   NEUROLOGICAL: No headaches, memory loss, loss of strength, numbness, or tremors  SKIN: No itching, burning, rashes, or lesions   LYMPH Nodes: No enlarged glands  ENDOCRINE: No heat or cold intolerance; No hair loss  MUSCULOSKELETAL: No joint pain or swelling; No muscle, back, or extremity pain  PSYCHIATRIC: No depression, anxiety, mood swings, or difficulty sleeping  HEME/LYMPH: No easy bruising, or bleeding gums  ALLERGY AND IMMUNOLOGIC: No hives or eczema	    [x ] All others negative	  [ ] Unable to obtain    PHYSICAL EXAM:  T(C): 36.8 (04-14-23 @ 04:17), Max: 36.8 (04-14-23 @ 00:21)  HR: 87 (04-14-23 @ 04:17) (70 - 111)  BP: 101/57 (04-14-23 @ 04:17) (90/54 - 111/63)  RR: 18 (04-14-23 @ 04:17) (18 - 18)  SpO2: 91% (04-14-23 @ 04:17) (90% - 92%)  Wt(kg): --  I&O's Summary    12 Apr 2023 07:01  -  13 Apr 2023 07:00  --------------------------------------------------------  IN: 600 mL / OUT: 1650 mL / NET: -1050 mL    13 Apr 2023 07:01  -  14 Apr 2023 04:30  --------------------------------------------------------  IN: 220 mL / OUT: 50 mL / NET: 170 mL        Appearance: Normal	  HEENT:   Normal oral mucosa, PERRL, EOMI	  Lymphatic: No lymphadenopathy  Cardiovascular: Normal S1 S2, No JVD, + murmurs, No edema  Respiratory: rhonchi  Psychiatry: A & O x 3, Mood & affect appropriate  Gastrointestinal:  Soft, Non-tender, + BS	  Skin: No rashes, No ecchymoses, No cyanosis	  Neurologic: Non-focal  Extremities: Normal range of motion, No clubbing, cyanosis or edema  Vascular: Peripheral pulses palpable 2+ bilaterally    MEDICATIONS  (STANDING):  atorvastatin 40 milliGRAM(s) Oral at bedtime  buMETAnide Injectable 2 milliGRAM(s) IV Push two times a day  chlorhexidine 4% Liquid 1 Application(s) Topical <User Schedule>  levothyroxine 137 MICROGram(s) Oral daily  metoprolol succinate  milliGRAM(s) Oral daily  polyethylene glycol 3350 17 Gram(s) Oral daily  senna 2 Tablet(s) Oral at bedtime      TELEMETRY: 	    ECG:  	  RADIOLOGY:  OTHER: 	  	  LABS:	 	    CARDIAC MARKERS:                                8.3    12.96 )-----------( 192      ( 13 Apr 2023 10:34 )             25.4     04-13    133<L>  |  93<L>  |  36<H>  ----------------------------<  114<H>  3.7   |  28  |  4.54<H>    Ca    8.1<L>      13 Apr 2023 07:54      proBNP:   Lipid Profile:   HgA1c:   TSH:   PTT - ( 13 Apr 2023 17:57 )  PTT:46.7 sec    Contacted by urology PA who reports that patient sees a private urologist who rounds at the hospital   Patient's private urologist was contacted by [facility]   Patient's private urologist to see him.    Assessment and plan  ---------------------------  77y m pmh BPH, Chronic venous insufficiency, HLD Hypothyroidism, S/P CABG x 2, S/P mitral valve replacement, SP Status post angioplasty, Sp TKR rt, Anasarca Pt comes to ed c/o shortness of breath for past month w progressive worsening of anasarca w swelling of scrotum and diff urinating, Has been noncompliant on diuretics for past two weeks w 20lb weight gain over past month. No fever and chills, sputum, nvdc, cp. PE Adult male lying stretcher w maked swelling to josh lower extr, w pitting edema,  chest  scant josh crackles w slight wheeze and prolonged exp phase  chf acute on chronic sec to RV dysfunction  tele, AFib hr controlled  will adjust cardiac meds  +BC i bottle, MSSA , s/p ppm explant  s/p PPM and LEAD extraction  oob too chair  PICC line needs 6 weeks of abx  acute on chronic renal failure/ renal appreciated  gentle hydration, repeat lytes in the afternoon, plan discussed with ACP  acute renal failure/ hematuria/ac was held awaiting renal eval/urology called awaiting ct abdomen and pelvis has ordered since last night awaiting results  beta blocker sed to rate control and hx of cad, s/p CABG  no further hematuria, start iv heparin low dose observe closely so far so good  on HD ?need of bumex  renal biopsy as per renal   vanco level noted  cardiomyopathy can not tolerate ACE/ARB  a.fib hr is well controlled  'hematuria, ac is held urology called, fu cbc closely discussed with ACP  nephrology  recommendations  Dr Mills is covering me

## 2023-04-14 NOTE — PROGRESS NOTE ADULT - PROBLEM SELECTOR PLAN 1
Pt. with oliguric BEULAH. Differential also includes AIN perhaps in setting of Cefazolin? vs overdiuresis. On review of [facility] pt noted to have a SCr of 1 on admission which since 4/1 has progressively increased to 7 most recently. UA with blood (suspected from delgadillo catheter insertion) and also proteinuria of 1.0g (initial elevation likely from gross hematuria and over-estimated).   Thus far JAGRUTI positive with homogenous pattern,   Serological work up negative.   Renal sonogram demonstrating a horseshoe kidney without evidence of hydronephrosis or renal calculi.  Pt underwent first session HD on 4/10 via RIJ nontunneled HD catheter; plan for HD again today  Tentative plan for kidney biopsy today  Monitor labs and urine output. Avoid nephrotoxins. Dose medications as per eGFR.

## 2023-04-14 NOTE — PROGRESS NOTE ADULT - PROBLEM SELECTOR PLAN 1
-only 1 out of 4 positive - still suspect this is real   -off ancef due to concern for AIN  -repeat bcx negative  -likely source is toe from recent nail clipping   -ERENDIRA negative for vegetations  -s/p PPM extraction and micra placement   -EP input noted  -also with low grade fevers  -picc  -6 weeks iv abx - day 15 of 42 of abx  -potential side effects of abx explained including GI, Cdiff, allergy issues, development of resistance, etc.  -potential side effects of PICC explained including bleeding and infection  -weekly cbc, bun/creatinine - fax [phone]  -OR cx negative  -dose vanco by level for now - if below 15, redose vanco 1 gm iv x 1  -f/u renal biopsy

## 2023-04-14 NOTE — PROGRESS NOTE ADULT - SUBJECTIVE AND OBJECTIVE BOX
MARIELOS JANEE 77y [id number]    Patient is a 77y old  Male who presents with a chief complaint of Chart Reviewed, Events noted  " 77  year old male h/o  CAD s/p stent , asa. A-fib/ PPM, , hypothyroid, and total left knee replacement  prior  PPM  interrogation  with  WCT/   s/p  brief   bursts  of  afib/ , on prior visit. Ct chest, retrosternal hematoma.  mod left pl effusion was  on bumex/  aldactone admitted with  worsening   pedal  edema  component  of  cellulitis   and  from  c/c  diastolic  chf,/  h/o  l/edema  of L  leg pt  admits to  being  non complaint with  meds"           (2023 11:57)      Follow Up/CC:  ID following for bacteremia    Interval History/ROS: s/p renal biopsy, no fever    Allergies    alfuzosin (Hives)  levofloxacin (Hives)  Myrbetriq (Hives)  tamsulosin (Hives)    Intolerances        ANTIMICROBIALS:      MEDICATIONS  (STANDING):  atorvastatin 40 milliGRAM(s) Oral at bedtime  buMETAnide Injectable 2 milliGRAM(s) IV Push two times a day  chlorhexidine 4% Liquid 1 Application(s) Topical <User Schedule>  levothyroxine 137 MICROGram(s) Oral daily  metoprolol succinate  milliGRAM(s) Oral daily  polyethylene glycol 3350 17 Gram(s) Oral daily  senna 2 Tablet(s) Oral at bedtime    MEDICATIONS  (PRN):  acetaminophen     Tablet .. 650 milliGRAM(s) Oral every 6 hours PRN Mild Pain (1 - 3)  bisacodyl Suppository 10 milliGRAM(s) Rectal daily PRN Constipation  sodium chloride 0.9% lock flush 10 milliLiter(s) IV Push every 1 hour PRN Pre/post blood products, medications, blood draw, and to maintain line patency        Vital Signs Last 24 Hrs  T(C): 36.9 (2023 13:52), Max: 37.1 (2023 09:38)  T(F): 98.4 (2023 13:52), Max: 98.8 (2023 09:38)  HR: 98 (2023 13:52) (84 - 111)  BP: 109/66 (2023 13:52) (90/54 - 118/63)  BP(mean): 72 (2023 11:10) (72 - 83)  RR: 18 (2023 13:52) (14 - 20)  SpO2: 91% (2023 13:52) (90% - 96%)    Parameters below as of 2023 13:52    O2 Flow (L/min): 2      CBC Full  -  ( 2023 06:56 )  WBC Count : 15.62 K/uL  RBC Count : 2.73 M/uL  Hemoglobin : 8.2 g/dL  Hematocrit : 25.0 %  Platelet Count - Automated : 174 K/uL  Mean Cell Volume : 91.6 fl  Mean Cell Hemoglobin : 30.0 pg  Mean Cell Hemoglobin Concentration : 32.8 gm/dL  Auto Neutrophil # : x  Auto Lymphocyte # : x  Auto Monocyte # : x  Auto Eosinophil # : x  Auto Basophil # : x  Auto Neutrophil % : x  Auto Lymphocyte % : x  Auto Monocyte % : x  Auto Eosinophil % : x  Auto Basophil % : x    04-14    131<L>  |  92<L>  |  48<H>  ----------------------------<  116<H>  3.9   |  27  |  5.99<H>    Ca    8.2<L>      2023 06:55        Urinalysis Basic - ( 2023 07:54 )    Color: DARK BROWN / Appearance: Turbid / S.026 / pH: x  Gluc: x / Ketone: Trace  / Bili: Negative / Urobili: Negative   Blood: x / Protein: 300 mg/dL / Nitrite: Negative   Leuk Esterase: Large / RBC: >50 /hpf / WBC 3-5   Sq Epi: x / Non Sq Epi: x / Bacteria: Negative        MICROBIOLOGY:  Clean Catch Clean Catch (Midstream)  23   No growth  --  --      .Other Other  23   No fungus isolated at 1 week.  --  --      .Blood Blood-Peripheral  23   No Growth Final  --  --      .Blood  23   Growth in anaerobic bottle: Staphylococcus aureus  ***Blood Panel PCR results on this specimen are available  approximately 3 hours after the Gram stain result.***  Gram stain, PCR, and/or culture results may not always  correspond due to difference in methodologies.  ************************************************************  This PCR assay was performed by multiplex PCR. This  Assay tests for 66 bacterial and resistance gene targets.  Please refer to the [facility] test directory  at [email or web address] for details.  --  Blood Culture PCR  Staphylococcus aureus      .Blood  23   No Growth Final  --  --              Vancomycin Level, Random: 21.3 ug/mL (23 @ 06:55)  v            RADIOLOGY

## 2023-04-14 NOTE — CHART NOTE - NSCHARTNOTEFT_GEN_A_CORE
IR planning for renal biopsy today  ID called to assess urinalysis result prior to proceeding with biopsy  Urinalysis from 4/13 reviewed, findings not consistent with infection. Minimal pyuria which can be seen in the setting of AIN, no bacteria noted  Cased discussed with on call attending Dr. Ortega

## 2023-04-14 NOTE — CONSULT NOTE ADULT - SUBJECTIVE AND OBJECTIVE BOX
Urology Consult Note    Chief Complaint: LE edema      History of Present Illness: 77  year old male h/o  CAD s/p stent , asa. A-fib/ PPM, , hypothyroid, and total left knee replacement  prior  PPM  interrogation  with  WCT/   s/p  brief   bursts  of  afib/ , on prior visit. Ct chest, retrosternal hematoma.  mod left pl effusion was  on bumex/  aldactone admitted with  worsening   pedal  edema  component  of  cellulitis   and  from  c/c  diastolic  chf. He complains of scrotal edema as well.     He sees Dr. [address] for recurrent UTIs.  consulted for hematuria while on AC. hep gtt was stopped. Urine is now blood tinged, more nikki than red. Urine culture  negative. CT from that time showed nonobstructing stones in [place] kidney. No bladder abnormalities. Being planned for long term abx via picc line      PAST MEDICAL & SURGICAL HISTORY:  Afib  not on anticoagulation      CAD (coronary artery disease)      Varicose vein of leg      Hypothyroid      Pacemaker      H/O fracture of hip        H/O asbestosis      HTN (hypertension)      Pacemaker  [employer] - Model RVDR01 1/10/2012      Stented coronary artery  drug eluding stent 2007      H/O detached retina repair        S/P cataract surgery      History of hip surgery  left hip ORIF      H/O varicose vein stripping   left leg      History of left knee replacement   - multiple infections post op requiring reoperation in , , )      H/O foot surgery  for infection of right foot 2019      H/O inguinal hernia repair  right 1993          FAMILY HISTORY:  FH: skin cancer (Father)        Allergies    alfuzosin (Hives)  levofloxacin (Hives)  Myrbetriq (Hives)  tamsulosin (Hives)    Intolerances        Social History:  Denies tobacco or alcohol use    Review of Systems:   Constitutional: No weight loss, no weakness  HEENT: No visual loss, no hearing loss, no sneezing  Skin: No rash or itching  CV: No chest pain, no chest pressure  Pulm: No shortness of breath, cough, or sputum  GI: No melena, no constipation  : Per HPI  Neuro: No headache, dizziness  MSK: No muscle pain, no joint pain  Heme: No anemia, bruising, or bleeding  Lymphatics: No enlarged nodes, no history of splenectomy  Psych: No depression of anxiety  Endo: No cold or heat intolerance  Allergies: No asthma, hives    Physical Exam:  Vital signs  T(C): 36.8 (23 @ 04:17), Max: 36.8 (23 @ 00:21)  HR: 85 (23 @ 04:20)  BP: 110/60 (23 @ 04:20)  SpO2: 91% (23 @ 04:17)  Wt(kg): --    Gen: No acute distress. Normal mood  HEENT: Normocephalic, neck supple  CV: Hemodynamically stable  Pulm: No increased work of breathing  Abd: soft, non-tender, non-distended  Back: No CVA tenderness, no midline pain  Extremities: No significant deformity or joint abnormality  Neuro: Alert & oriented x3, No focal deficits  Skin: Skin normal color, normal texture  Psych: Normal affect, normal behavior  : Nonpalpable bladder delgadillo in place blood tinged urine severe penoscrotal edema      Labs:       @ 06:56    WBC 15.62 / Hct 25.0  / SCr --        @ 06:55    WBC --    / Hct --    / SCr 5.99         131<L>  |  92<L>  |  48<H>  ----------------------------<  116<H>  3.9   |  27  |  5.99<H>    Ca    8.2<L>      2023 06:55      PTT - ( 2023 17:57 )  PTT:46.7 sec  Urinalysis Basic - ( 2023 07:54 )    Color: DARK BROWN / Appearance: Turbid / S.026 / pH: x  Gluc: x / Ketone: Trace  / Bili: Negative / Urobili: Negative   Blood: x / Protein: 300 mg/dL / Nitrite: Negative   Leuk Esterase: Large / RBC: >50 /hpf / WBC 3-5   Sq Epi: x / Non Sq Epi: x / Bacteria: Negative

## 2023-04-14 NOTE — CONSULT NOTE ADULT - ASSESSMENT
77  year old male multiple medical problems here cor CHF exacerbation, bacteremia.  consulted for hematuria on AC  - would send new urine culture  - send urine for cytology  - has CTAP and renal sono from earlier this admission - this is adequate upper tract imaging  - hydration as able and observe urine color  - will eventually need outpatient cystoscopy with his urologist  - if no bleeding x24 hours ok to try restarting anticoagulation    Bg Mercado MD  [facility] [address] [address] [phone]

## 2023-04-14 NOTE — PROGRESS NOTE ADULT - ASSESSMENT
77  year old male    h/o  CAD s/p stent , asa.      A-fib/ PPM, , hypothyroid, and total left knee replacement    prior  PPM  interrogation  with  WCT/   s/p  brief   bursts  of  afib/ , on prior visit   Ct chest, retrosternal hematoma.  mod left pl effusion    was  on bumex/  aldactone        *    admitted   for  worsening   pedal  edema          component  of  cellulitis   and  from  c/c  diastolic  chf,/  h/o  l/edema  of L  leg         pt  admits to  being  non complaint with  meds        *   h/o    c/c AFIB,   has  PPM         on eliquis     *    Anemia, , hb stable, had  been seen by  gi  eval dr joann sanchez in past     *    CAD,     cath, with 2 vessel disease,  s/p  CABG and  MVR  surg d r marni   *      h/o  Afib on    eliquis          c/c leg redness/  4 +  edema, on keflex, has  improved    *     echo,  on 7/2022,  ef  35/ new/ severe  TR          cath,   was non  obstructive    *    on synthroid          on asa/  torpol/  eliquis          crt  noted,   from  lasix// aldactone.  leg  swelling  is  lessening   *   MSSA  bacterinia           was  on iv ancef.  ,  rpt bcx    are  negative          explant pf  ppm  and  Micra  placement on  3/30/23. dr sunitha rocha   *    Echo,  ef  25,  mod  AI.  severe  TR        PICC  line  and  extended  course  of  ab.        BEULAH,  /   ?  AIN,  hence   ab was  switched   to iv vanco,  per  ID        follow  vanco  level  and dose  accordingly         Afib.  on iv heparin        crt   was  9,9, now  on HD /  bumex         hematuria ,  hence  iv heparin held/  aiwtaing renal bx          card /  dr mikayla dick         rad< from: TTE with Doppler (w/Cont) (07.05.22 @ 09:42) >  Conclusions:  Endocardial visualization enhanced with intravenous  injection of Ultrasonic Enhancing Agent (Definity).  Severe left ventricular enlargement.  Estimated ejection fraction 35%. Diffuse hypokinesis, with  inferior akinesis.  Bioprosthetic mitral valve with normal function.  Right ventricular enlargement with decreased right  ventricular systolic function.  A device wire is noted in the right heart.  ------------------------------------------------------------------------  Confirmed on  7/5/2022 - 12:16:10 by Kristian    < end of copied text >

## 2023-04-14 NOTE — PROGRESS NOTE ADULT - SUBJECTIVE AND OBJECTIVE BOX
[facility] DIVISION OF KIDNEY DISEASES AND HYPERTENSION -- FOLLOW UP NOTE  --------------------------------------------------------------------------------  HPI: 77 year old male with PMH of Afib, CAD s/p CABG, BPH, HLD, HTN, and hypothyroidism who presented to the hospital on 3/25 with complaints of shortness of breath and wiley gain. The patient had been nonadherent with his diuretic regimen as outpatient and presented with acute on chronic decompensated heart failure. The patient was initiated on IV diuresis with significant improvement in volume status with approximately 15lb weight loss. Hospital course was further complicated by MSSA bacteremia requiring pacemaker extraction with micra implant and currently being managed with IVC abx with PICC line in place. The nephrology team was consulted for oliguric BEULAH. On review of [facility] pt noted to have a SCr of 1 on admission which since 4/1 has progressively increased to 9.66 initiated on HD 4/10.    24 hour events/subjective:  Pt. seen and examined this morning.  He denies any headaches, fevers/chills, chest pain, and abdominal pain.        PAST HISTORY  --------------------------------------------------------------------------------  No significant changes to PMH, PSH, FHx, SHx, unless otherwise noted    ALLERGIES & MEDICATIONS  --------------------------------------------------------------------------------  Allergies    alfuzosin (Hives)  levofloxacin (Hives)  Myrbetriq (Hives)  tamsulosin (Hives)    Intolerances      Standing Inpatient Medications  atorvastatin 40 milliGRAM(s) Oral at bedtime  buMETAnide Injectable 2 milliGRAM(s) IV Push two times a day  chlorhexidine 4% Liquid 1 Application(s) Topical <User Schedule>  levothyroxine 137 MICROGram(s) Oral daily  metoprolol succinate  milliGRAM(s) Oral daily  polyethylene glycol 3350 17 Gram(s) Oral daily  senna 2 Tablet(s) Oral at bedtime    PRN Inpatient Medications  acetaminophen     Tablet .. 650 milliGRAM(s) Oral every 6 hours PRN  bisacodyl Suppository 10 milliGRAM(s) Rectal daily PRN  sodium chloride 0.9% lock flush 10 milliLiter(s) IV Push every 1 hour PRN      REVIEW OF SYSTEMS    All other systems were reviewed and are negative, except as noted.    VITALS/PHYSICAL EXAM  --------------------------------------------------------------------------------  T(C): 36.8 (04-14-23 @ 04:17), Max: 36.8 (04-14-23 @ 00:21)  HR: 85 (04-14-23 @ 04:20) (85 - 111)  BP: 110/60 (04-14-23 @ 04:20) (90/54 - 111/63)  RR: 18 (04-14-23 @ 04:17) (18 - 18)  SpO2: 91% (04-14-23 @ 04:17) (90% - 91%)  Wt(kg): --        04-13-23 @ 07:01  -  04-14-23 @ 07:00  --------------------------------------------------------  IN: 220 mL / OUT: 50 mL / NET: 170 mL        Physical Exam:  	Gen: NAD  	HEENT: MMM  	Pulm: CTA B/L  	CV: S1S2  	Abd: Soft, +BS   	Ext: +LE edema B/L  	Neuro: Awake  	Skin: Warm and dry  	Vascular access: RIJ nontunneled HD catheter      LABS/STUDIES  --------------------------------------------------------------------------------              8.3    12.96 >-----------<  192      [04-13-23 @ 10:34]              25.4     133  |  93  |  36  ----------------------------<  114      [04-13-23 @ 07:54]  3.7   |  28  |  4.54        Ca     8.1     [04-13-23 @ 07:54]        PTT: 46.7       [04-13-23 @ 17:57]      Creatinine Trend:  SCr 4.54 [04-13 @ 07:54]  SCr 5.52 [04-12 @ 06:47]  SCr 5.02 [04-11 @ 20:17]  SCr 7.45 [04-11 @ 07:08]  SCr 6.50 [04-10 @ 21:43]    Urinalysis - [04-13-23 @ 07:54]      Color DARK BROWN / Appearance Turbid / SG 1.026 / pH 6.5      Gluc 100 mg/dL / Ketone Trace  / Bili Negative / Urobili Negative       Blood Large / Protein 300 mg/dL / Leuk Est Large / Nitrite Negative      RBC >50 / WBC 3-5 / Hyaline 0-2 / Gran  / Sq Epi  / Non Sq Epi  / Bacteria Negative        HBsAg Nonreact      [04-06-23 @ 11:29]  HCV 0.15, Nonreact      [04-06-23 @ 11:46]  HIV Nonreact      [04-06-23 @ 11:32]    JAGRUTI: titer 1:160, pattern Homogeneous      [04-06-23 @ 11:29]  C3 Complement 126      [04-06-23 @ 11:47]  C4 Complement 25      [04-06-23 @ 11:47]  ANCA: cANCA Negative, pANCA Negative, atypical ANCA Negative      [04-06-23 @ 11:29]  anti-GBM <0.2      [04-06-23 @ 11:46]  PLA2R: BIANCA 4.2, IFA --      [04-06-23 @ 11:46]  Immunofixation Serum:   Weak IgA Lambda Band Identified    Reference Range: None Detected      [04-06-23 @ 11:47]  SPEP Interpretation: Weak Beta-Migrating Paraprotein Identified      [04-06-23 @ 11:47]   Orthopedic

## 2023-04-14 NOTE — PROGRESS NOTE ADULT - SUBJECTIVE AND OBJECTIVE BOX
date of service: 23 @ 10:02  afberile  REVIEW OF SYSTEMS:  CONSTITUTIONAL: No fever,  no  weight loss  ENT:  No  tinnitus,   no   vertigo  NECK: No pain or stiffness  RESPIRATORY: No cough, wheezing, chills or hemoptysis;    No Shortness of Breath  CARDIOVASCULAR: No chest pain, palpitations, dizziness  GASTROINTESTINAL: No abdominal or epigastric pain. No nausea, vomiting, or hematemesis; No diarrhea  No melena or hematochezia.  GENITOURINARY: No dysuria, frequency, hematuria, or incontinence  NEUROLOGICAL: No headaches  SKIN: No itching,  no   rash  LYMPH Nodes: No enlarged glands  ENDOCRINE: No heat or cold intolerance  MUSCULOSKELETAL: No joint pain or swelling  PSYCHIATRIC: No depression, anxiety  HEME/LYMPH: No easy bruising, or bleeding gums  ALLERGY AND IMMUNOLOGIC: No hives or eczema	    MEDICATIONS  (STANDING):  atorvastatin 40 milliGRAM(s) Oral at bedtime  buMETAnide Injectable 2 milliGRAM(s) IV Push two times a day  chlorhexidine 4% Liquid 1 Application(s) Topical <User Schedule>  levothyroxine 137 MICROGram(s) Oral daily  metoprolol succinate  milliGRAM(s) Oral daily  polyethylene glycol 3350 17 Gram(s) Oral daily  senna 2 Tablet(s) Oral at bedtime    MEDICATIONS  (PRN):  acetaminophen     Tablet .. 650 milliGRAM(s) Oral every 6 hours PRN Mild Pain (1 - 3)  bisacodyl Suppository 10 milliGRAM(s) Rectal daily PRN Constipation  sodium chloride 0.9% lock flush 10 milliLiter(s) IV Push every 1 hour PRN Pre/post blood products, medications, blood draw, and to maintain line patency      Vital Signs Last 24 Hrs  T(C): 37.1 (2023 09:54), Max: 37.1 (2023 09:38)  T(F): 98.8 (2023 09:38), Max: 98.8 (2023 09:38)  HR: 95 (2023 09:54) (85 - 111)  BP: 97/61 (2023 09:54) (90/54 - 111/63)  BP(mean): 83 (2023 09:54) (83 - 83)  RR: 18 (2023 09:54) (18 - 18)  SpO2: 91% (2023 09:54) (90% - 91%)    Parameters below as of 2023 09:54    O2 Flow (L/min): 1    CAPILLARY BLOOD GLUCOSE        I&O's Summary    2023 07:01  -  2023 07:00  --------------------------------------------------------  IN: 220 mL / OUT: 50 mL / NET: 170 mL          Appearance: Normal	  HEENT:   Normal oral mucosa, PERRL, EOMI	  Lymphatic: No lymphadenopathy  Cardiovascular: Normal S1 S2, No JVD  Respiratory: Lungs clear to auscultation	  Gastrointestinal:  Soft, Non-tender, + BS	  Skin: No rash, No ecchymoses	  Extremities:     LABS:                        8.2    15.62 )-----------( 174      ( 2023 06:56 )             25.0     04-14    131<L>  |  92<L>  |  48<H>  ----------------------------<  116<H>  3.9   |  27  |  5.99<H>    Ca    8.2<L>      2023 06:55      PT/INR - ( 2023 06:55 )   PT: 15.4 sec;   INR: 1.34 ratio         PTT - ( 2023 06:55 )  PTT:29.0 sec      Urinalysis Basic - ( 2023 07:54 )    Color: DARK BROWN / Appearance: Turbid / S.026 / pH: x  Gluc: x / Ketone: Trace  / Bili: Negative / Urobili: Negative   Blood: x / Protein: 300 mg/dL / Nitrite: Negative   Leuk Esterase: Large / RBC: >50 /hpf / WBC 3-5   Sq Epi: x / Non Sq Epi: x / Bacteria: Negative                      Consultant(s) Notes Reviewed:      Care Discussed with Consultants/Other Providers:

## 2023-04-15 LAB
ANION GAP SERPL CALC-SCNC: 14 MMOL/L — SIGNIFICANT CHANGE UP (ref 5–17)
BUN SERPL-MCNC: 31 MG/DL — HIGH (ref 7–23)
CALCIUM SERPL-MCNC: 8.3 MG/DL — LOW (ref 8.4–10.5)
CHLORIDE SERPL-SCNC: 96 MMOL/L — SIGNIFICANT CHANGE UP (ref 96–108)
CO2 SERPL-SCNC: 25 MMOL/L — SIGNIFICANT CHANGE UP (ref 22–31)
CREAT SERPL-MCNC: 4.14 MG/DL — HIGH (ref 0.5–1.3)
EGFR: 14 ML/MIN/1.73M2 — LOW
GLUCOSE SERPL-MCNC: 110 MG/DL — HIGH (ref 70–99)
HCT VFR BLD CALC: 26.9 % — LOW (ref 39–50)
HGB BLD-MCNC: 8.7 G/DL — LOW (ref 13–17)
MCHC RBC-ENTMCNC: 29.9 PG — SIGNIFICANT CHANGE UP (ref 27–34)
MCHC RBC-ENTMCNC: 32.3 GM/DL — SIGNIFICANT CHANGE UP (ref 32–36)
MCV RBC AUTO: 92.4 FL — SIGNIFICANT CHANGE UP (ref 80–100)
NRBC # BLD: 0 /100 WBCS — SIGNIFICANT CHANGE UP (ref 0–0)
PLATELET # BLD AUTO: 148 K/UL — LOW (ref 150–400)
POTASSIUM SERPL-MCNC: 4.2 MMOL/L — SIGNIFICANT CHANGE UP (ref 3.5–5.3)
POTASSIUM SERPL-SCNC: 4.2 MMOL/L — SIGNIFICANT CHANGE UP (ref 3.5–5.3)
RBC # BLD: 2.91 M/UL — LOW (ref 4.2–5.8)
RBC # FLD: 14.5 % — SIGNIFICANT CHANGE UP (ref 10.3–14.5)
SODIUM SERPL-SCNC: 135 MMOL/L — SIGNIFICANT CHANGE UP (ref 135–145)
WBC # BLD: 15.97 K/UL — HIGH (ref 3.8–10.5)
WBC # FLD AUTO: 15.97 K/UL — HIGH (ref 3.8–10.5)

## 2023-04-15 RX ORDER — LANOLIN ALCOHOL/MO/W.PET/CERES
3 CREAM (GRAM) TOPICAL ONCE
Refills: 0 | Status: COMPLETED | OUTPATIENT
Start: 2023-04-15 | End: 2023-04-15

## 2023-04-15 RX ADMIN — POLYETHYLENE GLYCOL 3350 17 GRAM(S): 17 POWDER, FOR SOLUTION ORAL at 22:28

## 2023-04-15 RX ADMIN — ATORVASTATIN CALCIUM 40 MILLIGRAM(S): 80 TABLET, FILM COATED ORAL at 22:29

## 2023-04-15 RX ADMIN — Medication 100 MILLIGRAM(S): at 05:21

## 2023-04-15 RX ADMIN — CHLORHEXIDINE GLUCONATE 1 APPLICATION(S): 213 SOLUTION TOPICAL at 05:22

## 2023-04-15 RX ADMIN — Medication 3 MILLIGRAM(S): at 22:29

## 2023-04-15 RX ADMIN — SENNA PLUS 2 TABLET(S): 8.6 TABLET ORAL at 22:29

## 2023-04-15 RX ADMIN — BUMETANIDE 2 MILLIGRAM(S): 0.25 INJECTION INTRAMUSCULAR; INTRAVENOUS at 18:08

## 2023-04-15 RX ADMIN — BUMETANIDE 2 MILLIGRAM(S): 0.25 INJECTION INTRAMUSCULAR; INTRAVENOUS at 05:21

## 2023-04-15 RX ADMIN — Medication 137 MICROGRAM(S): at 05:21

## 2023-04-15 RX ADMIN — ATORVASTATIN CALCIUM 40 MILLIGRAM(S): 80 TABLET, FILM COATED ORAL at 01:15

## 2023-04-15 NOTE — PROGRESS NOTE ADULT - SUBJECTIVE AND OBJECTIVE BOX
date of service: 04-15-23 @ 09:45  afberile  REVIEW OF SYSTEMS:  CONSTITUTIONAL: No fever,  no  weight loss  ENT:  No  tinnitus,   no   vertigo  NECK: No pain or stiffness  RESPIRATORY: No cough, wheezing, chills or hemoptysis;    No Shortness of Breath  CARDIOVASCULAR: No chest pain, palpitations, dizziness  GASTROINTESTINAL: No abdominal or epigastric pain. No nausea, vomiting, or hematemesis; No diarrhea  No melena or hematochezia.  GENITOURINARY: No dysuria, frequency, hematuria, or incontinence  NEUROLOGICAL: No headaches  SKIN: No itching,  no   rash  LYMPH Nodes: No enlarged glands  ENDOCRINE: No heat or cold intolerance  MUSCULOSKELETAL: No joint pain or swelling  PSYCHIATRIC: No depression, anxiety  HEME/LYMPH: No easy bruising, or bleeding gums  ALLERGY AND IMMUNOLOGIC: No hives or eczema	    MEDICATIONS  (STANDING):  atorvastatin 40 milliGRAM(s) Oral at bedtime  buMETAnide Injectable 2 milliGRAM(s) IV Push two times a day  chlorhexidine 4% Liquid 1 Application(s) Topical <User Schedule>  levothyroxine 137 MICROGram(s) Oral daily  metoprolol succinate  milliGRAM(s) Oral daily  polyethylene glycol 3350 17 Gram(s) Oral daily  senna 2 Tablet(s) Oral at bedtime    MEDICATIONS  (PRN):  acetaminophen     Tablet .. 650 milliGRAM(s) Oral every 6 hours PRN Mild Pain (1 - 3)  bisacodyl Suppository 10 milliGRAM(s) Rectal daily PRN Constipation  sodium chloride 0.9% lock flush 10 milliLiter(s) IV Push every 1 hour PRN Pre/post blood products, medications, blood draw, and to maintain line patency      Vital Signs Last 24 Hrs  T(C): 36.7 (15 Apr 2023 04:39), Max: 37.1 (14 Apr 2023 09:54)  T(F): 98 (15 Apr 2023 04:39), Max: 98.4 (14 Apr 2023 11:10)  HR: 95 (15 Apr 2023 05:15) (77 - 101)  BP: 105/60 (15 Apr 2023 05:15) (96/52 - 118/63)  BP(mean): 72 (14 Apr 2023 11:10) (72 - 83)  RR: 18 (15 Apr 2023 04:39) (14 - 20)  SpO2: 99% (15 Apr 2023 04:39) (91% - 100%)    Parameters below as of 15 Apr 2023 04:39  Patient On (Oxygen Delivery Method): room air      CAPILLARY BLOOD GLUCOSE        I&O's Summary    14 Apr 2023 07:01  -  15 Apr 2023 07:00  --------------------------------------------------------  IN: 0 mL / OUT: 1100 mL / NET: -1100 mL          Appearance: Normal	  HEENT:   Normal oral mucosa, PERRL, EOMI	  Lymphatic: No lymphadenopathy  Cardiovascular: Normal S1 S2, No JVD  Respiratory: Lungs clear to auscultation	  Gastrointestinal:  Soft, Non-tender, + BS	  Skin: No rash, No ecchymoses	  Extremities:     LABS:                        8.7    15.97 )-----------( 148      ( 15 Apr 2023 06:46 )             26.9     04-15    135  |  96  |  31<H>  ----------------------------<  110<H>  4.2   |  25  |  4.14<H>    Ca    8.3<L>      15 Apr 2023 06:46      PT/INR - ( 14 Apr 2023 06:55 )   PT: 15.4 sec;   INR: 1.34 ratio         PTT - ( 14 Apr 2023 06:55 )  PTT:29.0 sec                        Consultant(s) Notes Reviewed:      Care Discussed with Consultants/Other Providers:

## 2023-04-16 LAB
ANION GAP SERPL CALC-SCNC: 13 MMOL/L — SIGNIFICANT CHANGE UP (ref 5–17)
BUN SERPL-MCNC: 46 MG/DL — HIGH (ref 7–23)
CALCIUM SERPL-MCNC: 8 MG/DL — LOW (ref 8.4–10.5)
CHLORIDE SERPL-SCNC: 94 MMOL/L — LOW (ref 96–108)
CO2 SERPL-SCNC: 25 MMOL/L — SIGNIFICANT CHANGE UP (ref 22–31)
CREAT SERPL-MCNC: 5.01 MG/DL — HIGH (ref 0.5–1.3)
CULTURE RESULTS: NO GROWTH — SIGNIFICANT CHANGE UP
EGFR: 11 ML/MIN/1.73M2 — LOW
GLUCOSE SERPL-MCNC: 111 MG/DL — HIGH (ref 70–99)
HCT VFR BLD CALC: 26.2 % — LOW (ref 39–50)
HGB BLD-MCNC: 8.3 G/DL — LOW (ref 13–17)
MCHC RBC-ENTMCNC: 29.3 PG — SIGNIFICANT CHANGE UP (ref 27–34)
MCHC RBC-ENTMCNC: 31.7 GM/DL — LOW (ref 32–36)
MCV RBC AUTO: 92.6 FL — SIGNIFICANT CHANGE UP (ref 80–100)
NRBC # BLD: 0 /100 WBCS — SIGNIFICANT CHANGE UP (ref 0–0)
PLATELET # BLD AUTO: 138 K/UL — LOW (ref 150–400)
POTASSIUM SERPL-MCNC: 4.5 MMOL/L — SIGNIFICANT CHANGE UP (ref 3.5–5.3)
POTASSIUM SERPL-SCNC: 4.5 MMOL/L — SIGNIFICANT CHANGE UP (ref 3.5–5.3)
RBC # BLD: 2.83 M/UL — LOW (ref 4.2–5.8)
RBC # FLD: 14.5 % — SIGNIFICANT CHANGE UP (ref 10.3–14.5)
SODIUM SERPL-SCNC: 132 MMOL/L — LOW (ref 135–145)
SPECIMEN SOURCE: SIGNIFICANT CHANGE UP
WBC # BLD: 15.27 K/UL — HIGH (ref 3.8–10.5)
WBC # FLD AUTO: 15.27 K/UL — HIGH (ref 3.8–10.5)

## 2023-04-16 RX ORDER — LANOLIN ALCOHOL/MO/W.PET/CERES
3 CREAM (GRAM) TOPICAL ONCE
Refills: 0 | Status: COMPLETED | OUTPATIENT
Start: 2023-04-16 | End: 2023-04-16

## 2023-04-16 RX ADMIN — Medication 100 MILLIGRAM(S): at 05:55

## 2023-04-16 RX ADMIN — Medication 137 MICROGRAM(S): at 05:56

## 2023-04-16 RX ADMIN — BUMETANIDE 2 MILLIGRAM(S): 0.25 INJECTION INTRAMUSCULAR; INTRAVENOUS at 08:52

## 2023-04-16 RX ADMIN — Medication 650 MILLIGRAM(S): at 11:30

## 2023-04-16 RX ADMIN — SENNA PLUS 2 TABLET(S): 8.6 TABLET ORAL at 21:20

## 2023-04-16 RX ADMIN — Medication 650 MILLIGRAM(S): at 10:44

## 2023-04-16 RX ADMIN — BUMETANIDE 2 MILLIGRAM(S): 0.25 INJECTION INTRAMUSCULAR; INTRAVENOUS at 17:27

## 2023-04-16 RX ADMIN — POLYETHYLENE GLYCOL 3350 17 GRAM(S): 17 POWDER, FOR SOLUTION ORAL at 21:20

## 2023-04-16 RX ADMIN — Medication 3 MILLIGRAM(S): at 21:19

## 2023-04-16 RX ADMIN — CHLORHEXIDINE GLUCONATE 1 APPLICATION(S): 213 SOLUTION TOPICAL at 06:00

## 2023-04-16 RX ADMIN — ATORVASTATIN CALCIUM 40 MILLIGRAM(S): 80 TABLET, FILM COATED ORAL at 21:19

## 2023-04-16 NOTE — PROGRESS NOTE ADULT - ASSESSMENT
77  year old male    h/o  CAD s/p stent , asa.      A-fib/ PPM, , hypothyroid, and total left knee replacement    prior  PPM  interrogation  with  WCT/   s/p  brief   bursts  of  afib/ , on prior visit   Ct chest, retrosternal hematoma.  mod left pl effusion    was  on bumex/  aldactone        *    admitted   for  worsening   pedal  edema          component  of  cellulitis   and  from  c/c  diastolic  chf,/  h/o  l/edema  of L  leg         pt  admits to  being  non complaint with  meds        *   h/o    c/c AFIB,   has  PPM         on eliquis     *    Anemia, , hb stable, had  been seen by  gi  eval dr joann sanchez in past     *    CAD,     cath, with 2 vessel disease,  s/p  CABG and  MVR  surg d r marni   *      h/o  Afib on    eliquis          c/c leg redness/  4 +  edema, on keflex, has  improved    *     echo,  on 7/2022,  ef  35/ new/ severe  TR          cath,   was non  obstructive    *    on synthroid          on asa/  torpol/  eliquis          crt  noted,   from  lasix// aldactone.  leg  swelling  is  lessening   *   MSSA  bacterinia           was  on iv ancef.  ,  rpt bcx    are  negative          explant pf  ppm  and  Micra  placement on  3/30/23. dr sunitha rocha   *    Echo,  ef  25,  mod  AI.  severe  TR        PICC  line  and  extended  course  of  ab.        BEULAH,  /   ?  AIN,  hence   ab was  switched   to iv vanco,  per  ID        follow  vanco  level  and dose  accordingly         Afib.was   on iv heparin        crt   was  9,9, now  on HD /  bumex         hematuria ,  hence  iv heparin held/   s/p  renal bx on 4/ 14.  awiating path    crt  is  5/  on  HD/  spoke  with wife         card /  dr mikayla dick         rad< from: TTE with Doppler (w/Cont) (07.05.22 @ 09:42) >  Conclusions:  Endocardial visualization enhanced with intravenous  injection of Ultrasonic Enhancing Agent (Definity).  Severe left ventricular enlargement.  Estimated ejection fraction 35%. Diffuse hypokinesis, with  inferior akinesis.  Bioprosthetic mitral valve with normal function.  Right ventricular enlargement with decreased right  ventricular systolic function.  A device wire is noted in the right heart.  ------------------------------------------------------------------------  Confirmed on  7/5/2022 - 12:16:10 by Kristian    < end of copied text >

## 2023-04-16 NOTE — PROGRESS NOTE ADULT - SUBJECTIVE AND OBJECTIVE BOX
date of service: 04-16-23 @ 09:50  afberile  REVIEW OF SYSTEMS:  CONSTITUTIONAL: No fever,  no  weight loss  ENT:  No  tinnitus,   no   vertigo  NECK: No pain or stiffness  RESPIRATORY: No cough, wheezing, chills or hemoptysis;    No Shortness of Breath  CARDIOVASCULAR: No chest pain, palpitations, dizziness  GASTROINTESTINAL: No abdominal or epigastric pain. No nausea, vomiting, or hematemesis; No diarrhea  No melena or hematochezia.  GENITOURINARY: No dysuria, frequency, hematuria, or incontinence  NEUROLOGICAL: No headaches  SKIN: No itching,  no   rash  LYMPH Nodes: No enlarged glands  ENDOCRINE: No heat or cold intolerance  MUSCULOSKELETAL: No joint pain or swelling  PSYCHIATRIC: No depression, anxiety  HEME/LYMPH: No easy bruising, or bleeding gums  ALLERGY AND IMMUNOLOGIC: No hives or eczema	    MEDICATIONS  (STANDING):  atorvastatin 40 milliGRAM(s) Oral at bedtime  buMETAnide Injectable 2 milliGRAM(s) IV Push two times a day  chlorhexidine 4% Liquid 1 Application(s) Topical <User Schedule>  levothyroxine 137 MICROGram(s) Oral daily  metoprolol succinate  milliGRAM(s) Oral daily  polyethylene glycol 3350 17 Gram(s) Oral daily  senna 2 Tablet(s) Oral at bedtime    MEDICATIONS  (PRN):  acetaminophen     Tablet .. 650 milliGRAM(s) Oral every 6 hours PRN Mild Pain (1 - 3)  bisacodyl Suppository 10 milliGRAM(s) Rectal daily PRN Constipation  sodium chloride 0.9% lock flush 10 milliLiter(s) IV Push every 1 hour PRN Pre/post blood products, medications, blood draw, and to maintain line patency      Vital Signs Last 24 Hrs  T(C): 36.7 (16 Apr 2023 08:42), Max: 37.7 (15 Apr 2023 20:17)  T(F): 98.1 (16 Apr 2023 08:42), Max: 99.8 (15 Apr 2023 20:17)  HR: 90 (16 Apr 2023 08:42) (90 - 108)  BP: 111/69 (16 Apr 2023 08:42) (104/66 - 111/72)  BP(mean): 78 (16 Apr 2023 06:01) (78 - 78)  RR: 18 (16 Apr 2023 08:42) (18 - 18)  SpO2: 97% (16 Apr 2023 08:42) (92% - 97%)    Parameters below as of 16 Apr 2023 08:42  Patient On (Oxygen Delivery Method): room air      CAPILLARY BLOOD GLUCOSE        I&O's Summary    15 Apr 2023 07:01  -  16 Apr 2023 07:00  --------------------------------------------------------  IN: 220 mL / OUT: 0 mL / NET: 220 mL    16 Apr 2023 07:01  -  16 Apr 2023 09:50  --------------------------------------------------------  IN: 120 mL / OUT: 100 mL / NET: 20 mL          Appearance: Normal	  HEENT:   Normal oral mucosa, PERRL, EOMI	  Lymphatic: No lymphadenopathy  Cardiovascular: Normal S1 S2, No JVD  Respiratory: Lungs clear to auscultation	  Gastrointestinal:  Soft, Non-tender, + BS	  Skin: No rash, No ecchymoses	  Extremities:     LABS:                        8.3    15.27 )-----------( 138      ( 16 Apr 2023 07:05 )             26.2     04-16    132<L>  |  94<L>  |  46<H>  ----------------------------<  111<H>  4.5   |  25  |  5.01<H>    Ca    8.0<L>      16 Apr 2023 07:04                              Consultant(s) Notes Reviewed:      Care Discussed with Consultants/Other Providers:

## 2023-04-17 LAB
ANION GAP SERPL CALC-SCNC: 13 MMOL/L — SIGNIFICANT CHANGE UP (ref 5–17)
APTT BLD: 40.9 SEC — HIGH (ref 27.5–35.5)
BUN SERPL-MCNC: 58 MG/DL — HIGH (ref 7–23)
CALCIUM SERPL-MCNC: 7.9 MG/DL — LOW (ref 8.4–10.5)
CHLORIDE SERPL-SCNC: 93 MMOL/L — LOW (ref 96–108)
CO2 SERPL-SCNC: 25 MMOL/L — SIGNIFICANT CHANGE UP (ref 22–31)
CREAT SERPL-MCNC: 5.98 MG/DL — HIGH (ref 0.5–1.3)
EGFR: 9 ML/MIN/1.73M2 — LOW
GLUCOSE SERPL-MCNC: 116 MG/DL — HIGH (ref 70–99)
HCT VFR BLD CALC: 25.3 % — LOW (ref 39–50)
HCT VFR BLD CALC: 25.8 % — LOW (ref 39–50)
HGB BLD-MCNC: 8.2 G/DL — LOW (ref 13–17)
HGB BLD-MCNC: 8.4 G/DL — LOW (ref 13–17)
MAGNESIUM SERPL-MCNC: 1.8 MG/DL — SIGNIFICANT CHANGE UP (ref 1.6–2.6)
MCHC RBC-ENTMCNC: 29.1 PG — SIGNIFICANT CHANGE UP (ref 27–34)
MCHC RBC-ENTMCNC: 29.5 PG — SIGNIFICANT CHANGE UP (ref 27–34)
MCHC RBC-ENTMCNC: 32.4 GM/DL — SIGNIFICANT CHANGE UP (ref 32–36)
MCHC RBC-ENTMCNC: 32.6 GM/DL — SIGNIFICANT CHANGE UP (ref 32–36)
MCV RBC AUTO: 89.3 FL — SIGNIFICANT CHANGE UP (ref 80–100)
MCV RBC AUTO: 91 FL — SIGNIFICANT CHANGE UP (ref 80–100)
NRBC # BLD: 0 /100 WBCS — SIGNIFICANT CHANGE UP (ref 0–0)
NRBC # BLD: 0 /100 WBCS — SIGNIFICANT CHANGE UP (ref 0–0)
PHOSPHATE SERPL-MCNC: 5.7 MG/DL — HIGH (ref 2.5–4.5)
PLATELET # BLD AUTO: 131 K/UL — LOW (ref 150–400)
PLATELET # BLD AUTO: 135 K/UL — LOW (ref 150–400)
POTASSIUM SERPL-MCNC: 4.6 MMOL/L — SIGNIFICANT CHANGE UP (ref 3.5–5.3)
POTASSIUM SERPL-SCNC: 4.6 MMOL/L — SIGNIFICANT CHANGE UP (ref 3.5–5.3)
RBC # BLD: 2.78 M/UL — LOW (ref 4.2–5.8)
RBC # BLD: 2.89 M/UL — LOW (ref 4.2–5.8)
RBC # FLD: 14.3 % — SIGNIFICANT CHANGE UP (ref 10.3–14.5)
RBC # FLD: 14.4 % — SIGNIFICANT CHANGE UP (ref 10.3–14.5)
SODIUM SERPL-SCNC: 131 MMOL/L — LOW (ref 135–145)
VANCOMYCIN FLD-MCNC: 12.5 UG/ML — SIGNIFICANT CHANGE UP
WBC # BLD: 14.01 K/UL — HIGH (ref 3.8–10.5)
WBC # BLD: 14.64 K/UL — HIGH (ref 3.8–10.5)
WBC # FLD AUTO: 14.01 K/UL — HIGH (ref 3.8–10.5)
WBC # FLD AUTO: 14.64 K/UL — HIGH (ref 3.8–10.5)

## 2023-04-17 PROCEDURE — 99232 SBSQ HOSP IP/OBS MODERATE 35: CPT | Mod: GC

## 2023-04-17 PROCEDURE — 99232 SBSQ HOSP IP/OBS MODERATE 35: CPT

## 2023-04-17 RX ORDER — HEPARIN SODIUM 5000 [USP'U]/ML
INJECTION INTRAVENOUS; SUBCUTANEOUS
Qty: 25000 | Refills: 0 | Status: DISCONTINUED | OUTPATIENT
Start: 2023-04-17 | End: 2023-04-25

## 2023-04-17 RX ORDER — VANCOMYCIN HCL 1 G
1000 VIAL (EA) INTRAVENOUS ONCE
Refills: 0 | Status: COMPLETED | OUTPATIENT
Start: 2023-04-17 | End: 2023-04-17

## 2023-04-17 RX ORDER — HEPARIN SODIUM 5000 [USP'U]/ML
4000 INJECTION INTRAVENOUS; SUBCUTANEOUS EVERY 6 HOURS
Refills: 0 | Status: DISCONTINUED | OUTPATIENT
Start: 2023-04-17 | End: 2023-04-26

## 2023-04-17 RX ORDER — NYSTATIN CREAM 100000 [USP'U]/G
1 CREAM TOPICAL
Refills: 0 | Status: DISCONTINUED | OUTPATIENT
Start: 2023-04-17 | End: 2023-05-01

## 2023-04-17 RX ORDER — HEPARIN SODIUM 5000 [USP'U]/ML
8000 INJECTION INTRAVENOUS; SUBCUTANEOUS EVERY 6 HOURS
Refills: 0 | Status: DISCONTINUED | OUTPATIENT
Start: 2023-04-17 | End: 2023-04-26

## 2023-04-17 RX ADMIN — BUMETANIDE 2 MILLIGRAM(S): 0.25 INJECTION INTRAMUSCULAR; INTRAVENOUS at 16:13

## 2023-04-17 RX ADMIN — Medication 137 MICROGRAM(S): at 05:59

## 2023-04-17 RX ADMIN — HEPARIN SODIUM 2000 UNIT(S)/HR: 5000 INJECTION INTRAVENOUS; SUBCUTANEOUS at 22:36

## 2023-04-17 RX ADMIN — HEPARIN SODIUM 1800 UNIT(S)/HR: 5000 INJECTION INTRAVENOUS; SUBCUTANEOUS at 16:12

## 2023-04-17 RX ADMIN — Medication 100 MILLIGRAM(S): at 16:59

## 2023-04-17 RX ADMIN — HEPARIN SODIUM 4000 UNIT(S): 5000 INJECTION INTRAVENOUS; SUBCUTANEOUS at 22:39

## 2023-04-17 RX ADMIN — NYSTATIN CREAM 1 APPLICATION(S): 100000 CREAM TOPICAL at 21:51

## 2023-04-17 RX ADMIN — ATORVASTATIN CALCIUM 40 MILLIGRAM(S): 80 TABLET, FILM COATED ORAL at 21:51

## 2023-04-17 RX ADMIN — Medication 250 MILLIGRAM(S): at 16:13

## 2023-04-17 RX ADMIN — CHLORHEXIDINE GLUCONATE 1 APPLICATION(S): 213 SOLUTION TOPICAL at 08:31

## 2023-04-17 RX ADMIN — BUMETANIDE 2 MILLIGRAM(S): 0.25 INJECTION INTRAMUSCULAR; INTRAVENOUS at 05:58

## 2023-04-17 RX ADMIN — HEPARIN SODIUM 1800 UNIT(S)/HR: 5000 INJECTION INTRAVENOUS; SUBCUTANEOUS at 22:04

## 2023-04-17 NOTE — PROGRESS NOTE ADULT - SUBJECTIVE AND OBJECTIVE BOX
Interventional Radiology Follow-Up Note    This is a 77y Male s/p US guided left renal parenchymal biopsy on 4/14/23 in Interventional Radiology with Dr. Hutchins.     S: Patient seen and examined @ bedside. No complaints offered.     Medication:  buMETAnide Injectable: (04-17)  metoprolol succinate ER: (04-16)    Vitals:   T(F): 98, Max: 98.3 (11:59)  HR: 64  BP: 104/60  RR: 18  SpO2: 93%    Physical Exam:  General: Nontoxic, in NAD  Abdomen: soft, NTND  Back:  L flank clean, dry and intact, soft with no evidence of hematoma. Clean and dry dressing removed. NTTP.    LABS:  WBC -- / Hgb -- / Hct -- / Plt --  Na 131 / K 4.6 / CO2 25 / Cl 93 / BUN 58 / Cr 5.98 / Glucose 116  ALT -- / AST -- / Alk Phos -- / Tbili --  Ptt -- / Pt -- / INR --      Assessment/Plan:  77y Male with BEULAH, Differential also includes AIN perhaps in setting of Cefazolin? vs overdiuresis. IR consulted for random renal biopsy.  Pt most recently s/p US guided left renal parenchymal biopsy on 4/14/23     -continue global management per primary team  -monitor h/h; transfuse as needed  -VS/labs stable  -f/u pathology   -IR will sign off  Please call IR at extension [phone] with any questions, concerns, or issues regarding above.       Interventional Radiology Follow-Up Note    This is a 77y Male s/p left renal parenchymal biopsy on 4/14/23 in Interventional Radiology with Dr. Hutchins.     S: Patient seen and examined @ bedside. No complaints offered.     Medication:  buMETAnide Injectable: (04-17)  metoprolol succinate ER: (04-16)    Vitals:   T(F): 98, Max: 98.3 (11:59)  HR: 64  BP: 104/60  RR: 18  SpO2: 93%    Physical Exam:  General: Nontoxic, in NAD  Abdomen: soft, NTND  Back:  L flank clean, dry and intact, soft with no evidence of hematoma. Clean and dry dressing removed. NTTP.    LABS:  WBC -- / Hgb -- / Hct -- / Plt --  Na 131 / K 4.6 / CO2 25 / Cl 93 / BUN 58 / Cr 5.98 / Glucose 116  ALT -- / AST -- / Alk Phos -- / Tbili --  Ptt -- / Pt -- / INR --      Assessment/Plan:  77y Male with PMH of Afib, CAD s/p CABG, BPH, HLD, HTN, and hypothyroidism who presented to the hospital on 3/25 with complaints of shortness of breath and weight gain. Course also c/b oliguric BEULAH suspected in setting of overdiuresis and volume depletion followed by ARF and uremia.  Pt most recently s/p left renal parenchymal biopsy on 4/14/23     -continue global management per primary team  -monitor h/h; transfuse as needed  -VS/labs stable  -f/u pathology   -IR will sign off  Please call IR at extension [phone] with any questions, concerns, or issues regarding above.

## 2023-04-17 NOTE — PROGRESS NOTE ADULT - ASSESSMENT
77  year old male    h/o  CAD s/p stent , asa.      A-fib/ PPM, , hypothyroid, and total left knee replacement    prior  PPM  interrogation  with  [facility]/   s/p  brief   bursts  of  afib/ , on prior visit   Ct chest, retrosternal hematoma.  mod left pl effusion    was  on bumex/  aldactone        *    admitted   for  worsening   pedal  edema          component  of  cellulitis   and  from  c/c  diastolic  chf,/  h/o  l/edema  of L  leg         pt  admits to  being  non complaint with  meds        *   h/o    c/c AFIB,   has  PPM         on eliquis     *    Anemia, , hb stable, had  been seen by  gi  eval dr joann sanchez in past     *    CAD,     cath, with 2 vessel disease,  s/p  CABG and  MVR  surg d r marni   *      h/o  Afib on    eliquis          c/c leg redness/  4 +  edema, on keflex, has  improved    *     echo,  on 7/2022,  ef  35/ new/ severe  TR          cath,   was non  obstructive    *    on synthroid          on asa/  torpol/  eliquis          crt  noted,   from  lasix// aldactone.  leg  swelling  is  lessening   *   MSSA  bacterinia           was  on iv ancef.  ,  rpt bcx    are  negative          explant pf  ppm  and  Micra  placement on  3/30/23. dr sunitha rocha   *    Echo,  ef  25,  mod  AI.  severe  TR        PICC  line  and  extended  course  of  ab.        BEULAH,  /   ?  AIN,  hence   ab was  switched   to iv vanco,  per  ID        follow  vanco  level  and dose  accordingly         Afib. on iv heparin        crt   was  9,9, now  on HD            s/p  renal bx on 4/ 14.  awiating path       crt    in 5 range.   on  HD/   path  pending     on iv heparin          card /  dr mikayla dick         rad< from: TTE with Doppler (w/Cont) (07.05.22 @ 09:42) >  Conclusions:  Endocardial visualization enhanced with intravenous  injection of Ultrasonic Enhancing Agent (Definity).  Severe left ventricular enlargement.  Estimated ejection fraction 35%. Diffuse hypokinesis, with  inferior akinesis.  Bioprosthetic mitral valve with normal function.  Right ventricular enlargement with decreased right  ventricular systolic function.  A device wire is noted in the right heart.  ------------------------------------------------------------------------  Confirmed on  7/5/2022 - 12:16:10 by Kristian    < end of copied text >

## 2023-04-17 NOTE — PROGRESS NOTE ADULT - SUBJECTIVE AND OBJECTIVE BOX
date of service: 04-17-23 @ 13:21  afberile  REVIEW OF SYSTEMS:  CONSTITUTIONAL: No fever,  no  weight loss  ENT:  No  tinnitus,   no   vertigo  NECK: No pain or stiffness  RESPIRATORY: No cough, wheezing, chills or hemoptysis;    No Shortness of Breath  CARDIOVASCULAR: No chest pain, palpitations, dizziness  GASTROINTESTINAL: No abdominal or epigastric pain. No nausea, vomiting, or hematemesis; No diarrhea  No melena or hematochezia.  GENITOURINARY: No dysuria, frequency, hematuria, or incontinence  NEUROLOGICAL: No headaches  SKIN: No itching,  no   rash  LYMPH Nodes: No enlarged glands  ENDOCRINE: No heat or cold intolerance  MUSCULOSKELETAL: No joint pain or swelling  PSYCHIATRIC: No depression, anxiety  HEME/LYMPH: No easy bruising, or bleeding gums  ALLERGY AND IMMUNOLOGIC: No hives or eczema	    MEDICATIONS  (STANDING):  atorvastatin 40 milliGRAM(s) Oral at bedtime  buMETAnide Injectable 2 milliGRAM(s) IV Push two times a day  chlorhexidine 4% Liquid 1 Application(s) Topical <User Schedule>  levothyroxine 137 MICROGram(s) Oral daily  metoprolol succinate  milliGRAM(s) Oral daily    MEDICATIONS  (PRN):  acetaminophen     Tablet .. 650 milliGRAM(s) Oral every 6 hours PRN Mild Pain (1 - 3)  bisacodyl Suppository 10 milliGRAM(s) Rectal daily PRN Constipation  sodium chloride 0.9% lock flush 10 milliLiter(s) IV Push every 1 hour PRN Pre/post blood products, medications, blood draw, and to maintain line patency      Vital Signs Last 24 Hrs  T(C): 36.3 (17 Apr 2023 11:45), Max: 36.7 (16 Apr 2023 16:04)  T(F): 97.3 (17 Apr 2023 11:45), Max: 98 (16 Apr 2023 16:04)  HR: 84 (17 Apr 2023 11:45) (64 - 95)  BP: 100/52 (17 Apr 2023 11:45) (97/62 - 110/60)  BP(mean): --  RR: 18 (17 Apr 2023 11:45) (18 - 18)  SpO2: 94% (17 Apr 2023 11:45) (93% - 95%)    Parameters below as of 17 Apr 2023 11:45  Patient On (Oxygen Delivery Method): room air      CAPILLARY BLOOD GLUCOSE        I&O's Summary    16 Apr 2023 07:01  -  17 Apr 2023 07:00  --------------------------------------------------------  IN: 680 mL / OUT: 775 mL / NET: -95 mL          Appearance: Normal	  HEENT:   Normal oral mucosa, PERRL, EOMI	  Lymphatic: No lymphadenopathy  Cardiovascular: Normal S1 S2, No JVD  Respiratory: Lungs clear to auscultation	  Gastrointestinal:  Soft, Non-tender, + BS	  Skin: No rash, No ecchymoses	  Extremities: less  edema    LABS:                        8.2    14.64 )-----------( 135      ( 17 Apr 2023 07:06 )             25.3     04-17    131<L>  |  93<L>  |  58<H>  ----------------------------<  116<H>  4.6   |  25  |  5.98<H>    Ca    7.9<L>      17 Apr 2023 07:10  Phos  5.7     04-17  Mg     1.8     04-17                              Consultant(s) Notes Reviewed:      Care Discussed with Consultants/Other Providers:

## 2023-04-17 NOTE — PROGRESS NOTE ADULT - ATTENDING COMMENTS
77 year old male with PMH of Afib, CAD s/p CABG, BPH, HLD, HTN, and hypothyroidism who presented to the hospital on 3/25 with complaints of shortness of breath and wiley gain. The patient had been nonadherent with his diuretic regimen as outpatient and presented with acute on chronic decompensated heart failure. The patient was initiated on IV diuresis with significant improvement in volume status with approximately 15lb weight loss. Hospital course was further complicated by MSSA bacteremia requiring pacemaker extraction with micra implant and currently being managed with IVC abx with PICC line in place. The nephrology team was consulted for oliguric BEULAH. On review of [facility] pt noted to have a SCr of 1 on admission which since 4/1 has progressively increased to 9.66 initiated on HD 4/10. Last HD done on 4/14. Underwent kidney biopsy on 4/14. Seen on HD today, no complaints  1.  ARF--now HD dependent.  W/U hopefully being clarified by renal bx results   2.  Hypotension on HD--may need to lower UF goals.  HD orders reviewed with fellow, HD RN  discussed with med team  Bg Valerio MD  contact me on TEAMS

## 2023-04-17 NOTE — PROGRESS NOTE ADULT - SUBJECTIVE AND OBJECTIVE BOX
MARIELOS JANEE 77y [id number]    Patient is a 77y old  Male who presents with a chief complaint of Chart Reviewed, Events noted  " 77  year old male h/o  CAD s/p stent , asa. A-fib/ PPM, , hypothyroid, and total left knee replacement  prior  PPM  interrogation  with  [facility]/   s/p  brief   bursts  of  afib/ , on prior visit. Ct chest, retrosternal hematoma.  mod left pl effusion was  on bumex/  aldactone admitted with  worsening   pedal  edema  component  of  cellulitis   and  from  c/c  diastolic  chf,/  h/o  l/edema  of L  leg pt  admits to  being  non complaint with  meds"           (05 Apr 2023 11:57)      Follow Up/CC:  ID following for bacteremia    Interval History/ROS: no fever     Allergies    Myrbetriq (Hives)  tamsulosin (Hives)  levofloxacin (Hives)  alfuzosin (Hives)    Intolerances        ANTIMICROBIALS:      MEDICATIONS  (STANDING):  atorvastatin 40 milliGRAM(s) Oral at bedtime  buMETAnide Injectable 2 milliGRAM(s) IV Push two times a day  chlorhexidine 4% Liquid 1 Application(s) Topical <User Schedule>  heparin  Infusion.  Unit(s)/Hr (18 mL/Hr) IV Continuous <Continuous>  levothyroxine 137 MICROGram(s) Oral daily  metoprolol succinate  milliGRAM(s) Oral daily    MEDICATIONS  (PRN):  acetaminophen     Tablet .. 650 milliGRAM(s) Oral every 6 hours PRN Mild Pain (1 - 3)  bisacodyl Suppository 10 milliGRAM(s) Rectal daily PRN Constipation  heparin   Injectable 8000 Unit(s) IV Push every 6 hours PRN For aPTT less than 40  heparin   Injectable 4000 Unit(s) IV Push every 6 hours PRN For aPTT between 40 - 57  sodium chloride 0.9% lock flush 10 milliLiter(s) IV Push every 1 hour PRN Pre/post blood products, medications, blood draw, and to maintain line patency        Vital Signs Last 24 Hrs  T(C): 36.3 (17 Apr 2023 11:45), Max: 36.7 (16 Apr 2023 16:04)  T(F): 97.3 (17 Apr 2023 11:45), Max: 98 (16 Apr 2023 16:04)  HR: 84 (17 Apr 2023 11:45) (64 - 95)  BP: 100/52 (17 Apr 2023 11:45) (97/62 - 110/60)  BP(mean): --  RR: 18 (17 Apr 2023 11:45) (18 - 18)  SpO2: 94% (17 Apr 2023 11:45) (93% - 95%)    Parameters below as of 17 Apr 2023 11:45  Patient On (Oxygen Delivery Method): room air        CBC Full  -  ( 17 Apr 2023 07:06 )  WBC Count : 14.64 K/uL  RBC Count : 2.78 M/uL  Hemoglobin : 8.2 g/dL  Hematocrit : 25.3 %  Platelet Count - Automated : 135 K/uL  Mean Cell Volume : 91.0 fl  Mean Cell Hemoglobin : 29.5 pg  Mean Cell Hemoglobin Concentration : 32.4 gm/dL  Auto Neutrophil # : x  Auto Lymphocyte # : x  Auto Monocyte # : x  Auto Eosinophil # : x  Auto Basophil # : x  Auto Neutrophil % : x  Auto Lymphocyte % : x  Auto Monocyte % : x  Auto Eosinophil % : x  Auto Basophil % : x    04-17    131<L>  |  93<L>  |  58<H>  ----------------------------<  116<H>  4.6   |  25  |  5.98<H>    Ca    7.9<L>      17 Apr 2023 07:10  Phos  5.7     04-17  Mg     1.8     04-17            MICROBIOLOGY:  Clean Catch Clean Catch (Midstream)  04-15-23   No growth  --  --      Clean Catch Clean Catch (Midstream)  04-04-23   No growth  --  --      .Other Other  03-30-23   No fungus isolated at 2 weeks.  --  --      .Blood Blood-Peripheral  03-28-23   No Growth Final  --  --      .Blood  03-25-23   Growth in anaerobic bottle: Staphylococcus aureus  ***Blood Panel PCR results on this specimen are available  approximately 3 hours after the Gram stain result.***  Gram stain, PCR, and/or culture results may not always  correspond due to difference in methodologies.  ************************************************************  This PCR assay was performed by multiplex PCR. This  Assay tests for 66 bacterial and resistance gene targets.  Please refer to the [facility] test directory  at [email or web address] for details.  --  Blood Culture PCR  Staphylococcus aureus      .Blood  03-25-23   No Growth Final  --  --              Vancomycin Level, Random: 12.5 ug/mL (04-17-23 @ 07:10)  v            RADIOLOGY

## 2023-04-17 NOTE — PROGRESS NOTE ADULT - ASSESSMENT
78 y/o male PMHx HTN, HLD, CAD s/p stent on ASA, A-fib, hypothyroid, CHF now presenting to the ED worsening SOB, DUNCAN, peripheral edema, scrotal edema and 20 pound unintentional weight gain over last month with MSSA bacteremia, HF    Kevin Olmos  Attending Physician   Division of Infectious Disease  Office #[phone]  Available on Microsoft Teams also  After 5pm/weekend or no response, call #[phone]

## 2023-04-17 NOTE — PROGRESS NOTE ADULT - PROBLEM SELECTOR PLAN 1
Pt. with oliguric BEULAH. Differential also includes AIN perhaps in setting of Cefazolin? vs overdiuresis. On review of [facility] pt noted to have a Scr of 1 on admission which since 4/1 has progressively increased to 7 most recently. UA with blood (suspected from delgadillo catheter insertion) and also proteinuria of 1.0g (initial elevation likely from gross hematuria and over-estimated). Thus far JAGRUTI positive with homogenous pattern,   Serological work up negative so far. Renal sonogram demonstrating a horseshoe kidney without evidence of hydronephrosis or renal calculi. Pt underwent first session HD on 4/10 via RIJ non tunneled HD catheter. Last Hd done on 4/14.  plan for HD again today. Pt underwent kidney biopsy by IR on 4/1/4. Will f/u results. Monitor labs and urine output. Avoid nephrotoxins. Dose medications as per eGFR.

## 2023-04-17 NOTE — PROGRESS NOTE ADULT - SUBJECTIVE AND OBJECTIVE BOX
Date of Service: 04-17-23 @ 14:45           CARDIOLOGY     PROGRESS  NOTE   ________________________________________________    CHIEF COMPLAINT:Patient is a 77y old  Male who presents with a chief complaint of Chart Reviewed, Events noted  " 77  year old male h/o  CAD s/p stent , asa. A-fib/ PPM, , hypothyroid, and total left knee replacement  prior  PPM  interrogation  with  [facility]/   s/p  brief   bursts  of  afib/ , on prior visit. Ct chest, retrosternal hematoma.  mod left pl effusion was  on bumex/  aldactone admitted with  worsening   pedal  edema  component  of  cellulitis   and  from  c/c  diastolic  chf,/  h/o  l/edema  of L  leg pt  admits to  being  non complaint with  meds"    no complain    	  REVIEW OF SYSTEMS:  CONSTITUTIONAL: No fever, weight loss, or fatigue  EYES: No eye pain, visual disturbances, or discharge  ENT:  No difficulty hearing, tinnitus, vertigo; No sinus or throat pain  NECK: No pain or stiffness  RESPIRATORY: No cough, wheezing, chills or hemoptysis; No Shortness of Breath  CARDIOVASCULAR: No chest pain, palpitations, passing out, dizziness, or leg swelling  GASTROINTESTINAL: No abdominal or epigastric pain. No nausea, vomiting, or hematemesis; No diarrhea or constipation. No melena or hematochezia.  GENITOURINARY: No dysuria, frequency, hematuria, or incontinence  NEUROLOGICAL: No headaches, memory loss, loss of strength, numbness, or tremors  SKIN: No itching, burning, rashes, or lesions   LYMPH Nodes: No enlarged glands  ENDOCRINE: No heat or cold intolerance; No hair loss  MUSCULOSKELETAL: No joint pain or swelling; No muscle, back, or extremity pain  PSYCHIATRIC: No depression, anxiety, mood swings, or difficulty sleeping  HEME/LYMPH: No easy bruising, or bleeding gums  ALLERGY AND IMMUNOLOGIC: No hives or eczema	    x[ ] All others negative	  [ ] Unable to obtain    PHYSICAL EXAM:  T(C): 36.3 (04-17-23 @ 11:45), Max: 36.7 (04-16-23 @ 16:04)  HR: 84 (04-17-23 @ 11:45) (64 - 95)  BP: 100/52 (04-17-23 @ 11:45) (97/62 - 110/60)  RR: 18 (04-17-23 @ 11:45) (18 - 18)  SpO2: 94% (04-17-23 @ 11:45) (93% - 95%)  Wt(kg): --  I&O's Summary    16 Apr 2023 07:01  -  17 Apr 2023 07:00  --------------------------------------------------------  IN: 680 mL / OUT: 775 mL / NET: -95 mL        Appearance: Normal	  HEENT:   Normal oral mucosa, PERRL, EOMI	  Lymphatic: No lymphadenopathy  Cardiovascular: Normal S1 S2, No JVD, + murmurs, No edema  Respiratory: rhonchi  Psychiatry: A & O x 3, Mood & affect appropriate  Gastrointestinal:  Soft, Non-tender, + BS	  Skin: No rashes, No ecchymoses, No cyanosis	  Neurologic: Non-focal  Extremities: Normal range of motion, No clubbing, cyanosis or edema  Vascular: Peripheral pulses palpable 2+ bilaterally    MEDICATIONS  (STANDING):  atorvastatin 40 milliGRAM(s) Oral at bedtime  buMETAnide Injectable 2 milliGRAM(s) IV Push two times a day  chlorhexidine 4% Liquid 1 Application(s) Topical <User Schedule>  heparin  Infusion.  Unit(s)/Hr (18 mL/Hr) IV Continuous <Continuous>  levothyroxine 137 MICROGram(s) Oral daily  metoprolol succinate  milliGRAM(s) Oral daily  vancomycin  IVPB 1000 milliGRAM(s) IV Intermittent once      TELEMETRY: 	    ECG:  	  RADIOLOGY:  OTHER: 	  	  LABS:	 	    CARDIAC MARKERS:                                8.2    14.64 )-----------( 135      ( 17 Apr 2023 07:06 )             25.3     04-17    131<L>  |  93<L>  |  58<H>  ----------------------------<  116<H>  4.6   |  25  |  5.98<H>    Ca    7.9<L>      17 Apr 2023 07:10  Phos  5.7     04-17  Mg     1.8     04-17      proBNP:   Lipid Profile:   HgA1c:   TSH:         Assessment and plan  ---------------------------  77y m pmh BPH, Chronic venous insufficiency, HLD Hypothyroidism, S/P CABG x 2, S/P mitral valve replacement, SP Status post angioplasty, Sp TKR rt, Anasarca Pt comes to ed c/o shortness of breath for past month w progressive worsening of anasarca w swelling of scrotum and diff urinating, Has been noncompliant on diuretics for past two weeks w 20lb weight gain over past month. No fever and chills, sputum, nvdc, cp. PE Adult male lying stretcher w maked swelling to josh lower extr, w pitting edema,  chest  scant josh crackles w slight wheeze and prolonged exp phase  chf acute on chronic sec to RV dysfunction  tele, AFib hr controlled  will adjust cardiac meds  +BC i bottle, MSSA , s/p ppm explant  s/p PPM and LEAD extraction  oob too chair  PICC line needs 6 weeks of abx  acute on chronic renal failure/ renal appreciated  gentle hydration, repeat lytes in the afternoon, plan discussed with ACP  acute renal failure/ hematuria/ac was held awaiting renal eval/urology called awaiting ct abdomen and pelvis has ordered since last night awaiting results  beta blocker sed to rate control and hx of cad, s/p CABG  no further hematuria, start iv heparin low dose observe closely so far so good  on HD ?need of bumex  renal biopsy as per renal   vanco level noted  cardiomyopathy can not tolerate ACE/ARB  a.fib hr is well controlled  hematuria, ac is held urology called, fu cbc closely discussed with ACP mat re start if hematuria has rersolved  nephrology/ ID appreciated  nephrology  recommendations

## 2023-04-17 NOTE — PROGRESS NOTE ADULT - SUBJECTIVE AND OBJECTIVE BOX
[facility] DIVISION OF KIDNEY DISEASES AND HYPERTENSION -- FOLLOW UP NOTE  --------------------------------------------------------------------------------  HPI: 77 year old male with PMH of Afib, CAD s/p CABG, BPH, HLD, HTN, and hypothyroidism who presented to the hospital on 3/25 with complaints of shortness of breath and wiley gain. The patient had been nonadherent with his diuretic regimen as outpatient and presented with acute on chronic decompensated heart failure. The patient was initiated on IV diuresis with significant improvement in volume status with approximately 15lb weight loss. Hospital course was further complicated by MSSA bacteremia requiring pacemaker extraction with micra implant and currently being managed with IVC abx with PICC line in place. The nephrology team was consulted for oliguric BEULAH. On review of [facility] pt noted to have a SCr of 1 on admission which since 4/1 has progressively increased to 9.66 initiated on HD 4/10. Last HD done on 4/14. Underwent kidney biopsy on 4/14.     24 hour events/subjective:  Pt. seen and examined this morning. He denies any headaches, fevers/chills, chest pain, and abdominal pain.    PAST HISTORY  --------------------------------------------------------------------------------  No significant changes to PMH, PSH, FHx, SHx, unless otherwise noted    ALLERGIES & MEDICATIONS  --------------------------------------------------------------------------------  Allergies    Myrbetriq (Hives)  tamsulosin (Hives)  levofloxacin (Hives)  alfuzosin (Hives)    Intolerances    Standing Inpatient Medications  atorvastatin 40 milliGRAM(s) Oral at bedtime  buMETAnide Injectable 2 milliGRAM(s) IV Push two times a day  chlorhexidine 4% Liquid 1 Application(s) Topical <User Schedule>  levothyroxine 137 MICROGram(s) Oral daily  metoprolol succinate  milliGRAM(s) Oral daily    PRN Inpatient Medications  acetaminophen     Tablet .. 650 milliGRAM(s) Oral every 6 hours PRN  bisacodyl Suppository 10 milliGRAM(s) Rectal daily PRN  sodium chloride 0.9% lock flush 10 milliLiter(s) IV Push every 1 hour PRN    REVIEW OF SYSTEMS  --------------------------------------------------------------------------------  Gen: No fevers   Respiratory: No dyspnea  CV: No chest pain  GI: No abdominal pain  : No dysuria  MSK: No  edema  Neuro: no dizziness   All other systems were reviewed and are negative, except as noted.    VITALS/PHYSICAL EXAM  --------------------------------------------------------------------------------  T(C): 36.7 (04-17-23 @ 09:30), Max: 36.8 (04-16-23 @ 11:59)  HR: 66 (04-17-23 @ 09:30) (61 - 95)  BP: 110/60 (04-17-23 @ 09:30) (92/58 - 110/60)  RR: 18 (04-17-23 @ 09:30) (18 - 18)  SpO2: 95% (04-17-23 @ 09:30) (93% - 95%)  Wt(kg): --    04-16-23 @ 07:01  -  04-17-23 @ 07:00  --------------------------------------------------------  IN: 680 mL / OUT: 775 mL / NET: -95 mL    Physical Exam:  	Gen: NAD  	HEENT: MMM  	Pulm: CTA B/L  	CV: S1S2  	Abd: Soft, +BS   	Ext: +LE edema B/L  	Neuro: Awake  	Skin: Warm and dry  	Vascular access: RIJ non tunneled HD catheter    LABS/STUDIES  --------------------------------------------------------------------------------              8.2    14.64 >-----------<  135      [04-17-23 @ 07:06]              25.3     131  |  93  |  58  ----------------------------<  116      [04-17-23 @ 07:10]  4.6   |  25  |  5.98        Ca     7.9     [04-17-23 @ 07:10]      Mg     1.8     [04-17-23 @ 07:10]      Phos  5.7     [04-17-23 @ 07:10]    Creatinine Trend:  SCr 5.98 [04-17 @ 07:10]  SCr 5.01 [04-16 @ 07:04]  SCr 4.14 [04-15 @ 06:46]  SCr 5.99 [04-14 @ 06:55]  SCr 4.54 [04-13 @ 07:54]    Urinalysis - [04-13-23 @ 07:54]      Color DARK BROWN / Appearance Turbid / SG 1.026 / pH 6.5      Gluc 100 mg/dL / Ketone Trace  / Bili Negative / Urobili Negative       Blood Large / Protein 300 mg/dL / Leuk Est Large / Nitrite Negative      RBC >50 / WBC 3-5 / Hyaline 0-2 / Gran  / Sq Epi  / Non Sq Epi  / Bacteria Negative    HBsAg Nonreact      [04-06-23 @ 11:29]  HCV 0.15, Nonreact      [04-06-23 @ 11:46]  HIV Nonreact      [04-06-23 @ 11:32]    JAGRUTI: titer 1:160, pattern Homogeneous      [04-06-23 @ 11:29]  C3 Complement 126      [04-06-23 @ 11:47]  C4 Complement 25      [04-06-23 @ 11:47]  ANCA: cANCA Negative, pANCA Negative, atypical ANCA Negative      [04-06-23 @ 11:29]  anti-GBM <0.2      [04-06-23 @ 11:46]  PLA2R: BIANCA 4.2, IFA --      [04-06-23 @ 11:46]  Immunofixation Serum:   Weak IgA Lambda Band Identified    Reference Range: None Detected      [04-06-23 @ 11:47]  SPEP Interpretation: Weak Beta-Migrating Paraprotein Identified      [04-06-23 @ 11:47]

## 2023-04-17 NOTE — PROGRESS NOTE ADULT - PROBLEM SELECTOR PLAN 1
-only 1 out of 4 positive - still suspect this is real   -off ancef due to concern for AIN  -repeat bcx negative  -likely source is toe from recent nail clipping   -ERENDIRA negative for vegetations  -s/p PPM extraction and micra placement   -EP input noted  -also with low grade fevers  -picc  -6 weeks iv abx - day 18 of 42 of abx  -potential side effects of abx explained including GI, Cdiff, allergy issues, development of resistance, etc.  -potential side effects of PICC explained including bleeding and infection  -weekly cbc, bun/creatinine - fax [phone]  -OR cx negative  -dose vanco by level for now - redose vanco 1 gm iv x 1  -f/u renal biopsy

## 2023-04-18 LAB
APTT BLD: 55.5 SEC — HIGH (ref 27.5–35.5)
APTT BLD: 65.6 SEC — HIGH (ref 27.5–35.5)
APTT BLD: 67.3 SEC — HIGH (ref 27.5–35.5)
HCT VFR BLD CALC: 25.7 % — LOW (ref 39–50)
HGB BLD-MCNC: 8.2 G/DL — LOW (ref 13–17)
MCHC RBC-ENTMCNC: 29.3 PG — SIGNIFICANT CHANGE UP (ref 27–34)
MCHC RBC-ENTMCNC: 31.9 GM/DL — LOW (ref 32–36)
MCV RBC AUTO: 91.8 FL — SIGNIFICANT CHANGE UP (ref 80–100)
NRBC # BLD: 0 /100 WBCS — SIGNIFICANT CHANGE UP (ref 0–0)
PLATELET # BLD AUTO: 142 K/UL — LOW (ref 150–400)
RBC # BLD: 2.8 M/UL — LOW (ref 4.2–5.8)
RBC # FLD: 14.5 % — SIGNIFICANT CHANGE UP (ref 10.3–14.5)
VANCOMYCIN FLD-MCNC: 16.6 UG/ML — SIGNIFICANT CHANGE UP
WBC # BLD: 12.63 K/UL — HIGH (ref 3.8–10.5)
WBC # FLD AUTO: 12.63 K/UL — HIGH (ref 3.8–10.5)

## 2023-04-18 PROCEDURE — 99232 SBSQ HOSP IP/OBS MODERATE 35: CPT

## 2023-04-18 RX ORDER — BUMETANIDE 0.25 MG/ML
2 INJECTION INTRAMUSCULAR; INTRAVENOUS DAILY
Refills: 0 | Status: DISCONTINUED | OUTPATIENT
Start: 2023-04-19 | End: 2023-04-20

## 2023-04-18 RX ORDER — VANCOMYCIN HCL 1 G
1000 VIAL (EA) INTRAVENOUS ONCE
Refills: 0 | Status: COMPLETED | OUTPATIENT
Start: 2023-04-19 | End: 2023-04-19

## 2023-04-18 RX ADMIN — BUMETANIDE 2 MILLIGRAM(S): 0.25 INJECTION INTRAMUSCULAR; INTRAVENOUS at 14:10

## 2023-04-18 RX ADMIN — HEPARIN SODIUM 4000 UNIT(S): 5000 INJECTION INTRAVENOUS; SUBCUTANEOUS at 13:19

## 2023-04-18 RX ADMIN — HEPARIN SODIUM 2200 UNIT(S)/HR: 5000 INJECTION INTRAVENOUS; SUBCUTANEOUS at 18:09

## 2023-04-18 RX ADMIN — HEPARIN SODIUM 2000 UNIT(S)/HR: 5000 INJECTION INTRAVENOUS; SUBCUTANEOUS at 05:56

## 2023-04-18 RX ADMIN — HEPARIN SODIUM 2200 UNIT(S)/HR: 5000 INJECTION INTRAVENOUS; SUBCUTANEOUS at 13:16

## 2023-04-18 RX ADMIN — HEPARIN SODIUM 2000 UNIT(S)/HR: 5000 INJECTION INTRAVENOUS; SUBCUTANEOUS at 06:13

## 2023-04-18 RX ADMIN — HEPARIN SODIUM 2200 UNIT(S)/HR: 5000 INJECTION INTRAVENOUS; SUBCUTANEOUS at 20:11

## 2023-04-18 RX ADMIN — ATORVASTATIN CALCIUM 40 MILLIGRAM(S): 80 TABLET, FILM COATED ORAL at 22:09

## 2023-04-18 RX ADMIN — NYSTATIN CREAM 1 APPLICATION(S): 100000 CREAM TOPICAL at 17:28

## 2023-04-18 RX ADMIN — NYSTATIN CREAM 1 APPLICATION(S): 100000 CREAM TOPICAL at 05:29

## 2023-04-18 NOTE — PROGRESS NOTE ADULT - SUBJECTIVE AND OBJECTIVE BOX
date of service: 04-18-23 @ 09:58  afebriel  REVIEW OF SYSTEMS:  CONSTITUTIONAL: No fever,  no  weight loss  ENT:  No  tinnitus,   no   vertigo  NECK: No pain or stiffness  RESPIRATORY: No cough, wheezing, chills or hemoptysis;    No Shortness of Breath  CARDIOVASCULAR: No chest pain, palpitations, dizziness  GASTROINTESTINAL: No abdominal or epigastric pain. No nausea, vomiting, or hematemesis; No diarrhea  No melena or hematochezia.  GENITOURINARY: No dysuria, frequency, hematuria, or incontinence  NEUROLOGICAL: No headaches  SKIN: No itching,  no   rash  LYMPH Nodes: No enlarged glands  ENDOCRINE: No heat or cold intolerance  MUSCULOSKELETAL: No joint pain or swelling  PSYCHIATRIC: No depression, anxiety  HEME/LYMPH: No easy bruising, or bleeding gums  ALLERGY AND IMMUNOLOGIC: No hives or eczema	    MEDICATIONS  (STANDING):  atorvastatin 40 milliGRAM(s) Oral at bedtime  buMETAnide Injectable 2 milliGRAM(s) IV Push two times a day  chlorhexidine 4% Liquid 1 Application(s) Topical <User Schedule>  heparin  Infusion.  Unit(s)/Hr (18 mL/Hr) IV Continuous <Continuous>  levothyroxine 137 MICROGram(s) Oral daily  metoprolol succinate  milliGRAM(s) Oral daily  nystatin Powder 1 Application(s) Topical two times a day    MEDICATIONS  (PRN):  acetaminophen     Tablet .. 650 milliGRAM(s) Oral every 6 hours PRN Mild Pain (1 - 3)  bisacodyl Suppository 10 milliGRAM(s) Rectal daily PRN Constipation  heparin   Injectable 8000 Unit(s) IV Push every 6 hours PRN For aPTT less than 40  heparin   Injectable 4000 Unit(s) IV Push every 6 hours PRN For aPTT between 40 - 57  sodium chloride 0.9% lock flush 10 milliLiter(s) IV Push every 1 hour PRN Pre/post blood products, medications, blood draw, and to maintain line patency      Vital Signs Last 24 Hrs  T(C): 36.5 (18 Apr 2023 04:57), Max: 36.7 (17 Apr 2023 19:15)  T(F): 97.7 (18 Apr 2023 04:57), Max: 98 (17 Apr 2023 19:15)  HR: 84 (18 Apr 2023 04:57) (66 - 103)  BP: 100/53 (18 Apr 2023 04:57) (99/50 - 128/60)  BP(mean): --  RR: 18 (18 Apr 2023 04:57) (18 - 20)  SpO2: 96% (18 Apr 2023 04:57) (91% - 96%)    Parameters below as of 18 Apr 2023 04:57  Patient On (Oxygen Delivery Method): room air      CAPILLARY BLOOD GLUCOSE        I&O's Summary    17 Apr 2023 07:01  -  18 Apr 2023 07:00  --------------------------------------------------------  IN: 0 mL / OUT: 600 mL / NET: -600 mL          Appearance: Normal	  HEENT:   Normal oral mucosa, PERRL, EOMI	  Lymphatic: No lymphadenopathy  Cardiovascular: Normal S1 S2, No JVD  Respiratory: Lungs clear to auscultation	  Gastrointestinal:  Soft, Non-tender, + BS	  Skin: No rash, No ecchymoses	  Extremities:     LABS:                        8.2    12.63 )-----------( 142      ( 18 Apr 2023 05:44 )             25.7     04-17    131<L>  |  93<L>  |  58<H>  ----------------------------<  116<H>  4.6   |  25  |  5.98<H>    Ca    7.9<L>      17 Apr 2023 07:10  Phos  5.7     04-17  Mg     1.8     04-17      PTT - ( 18 Apr 2023 05:44 )  PTT:65.6 sec                        Consultant(s) Notes Reviewed:      Care Discussed with Consultants/Other Providers:

## 2023-04-18 NOTE — PROGRESS NOTE ADULT - PROBLEM SELECTOR PLAN 1
-only 1 out of 4 positive - still suspect this is real   -off ancef due to concern for AIN  -repeat bcx negative  -likely source is toe from recent nail clipping   -ERENDIRA negative for vegetations  -s/p PPM extraction and micra placement   -EP input noted  -also with low grade fevers  -picc  -6 weeks iv abx - day 19 of 42 of abx  -potential side effects of abx explained including GI, Cdiff, allergy issues, development of resistance, etc.  -potential side effects of PICC explained including bleeding and infection  -weekly cbc, bun/creatinine - fax [phone]  -OR cx negative  -dose vanco by level for now - redose vanco 1 gm iv x 1 on 4/19 and check level on 4/20   -f/u renal biopsy

## 2023-04-18 NOTE — PROGRESS NOTE ADULT - SUBJECTIVE AND OBJECTIVE BOX
Date of Service: 04-18-23 @ 07:32           CARDIOLOGY     PROGRESS  NOTE   ________________________________________________    CHIEF COMPLAINT:Patient is a 77y old  Male who presents with a chief complaint of Chart Reviewed, Events noted  " 77  year old male h/o  CAD s/p stent , asa. A-fib/ PPM, , hypothyroid, and total left knee replacement  prior  PPM  interrogation  with  [facility]/   s/p  brief   bursts  of  afib/ , on prior visit. Ct chest, retrosternal hematoma.  mod left pl effusion was  on bumex/  aldactone admitted with  worsening   pedal  edema  component  of  cellulitis   and  from  c/c  diastolic  chf,/  h/o  l/edema  of L  leg pt  admits to  being  non complaint with  meds"    feeling weak      	  REVIEW OF SYSTEMS:  CONSTITUTIONAL: No fever, weight loss, or fatigue  EYES: No eye pain, visual disturbances, or discharge  ENT:  No difficulty hearing, tinnitus, vertigo; No sinus or throat pain  NECK: No pain or stiffness  RESPIRATORY: No cough, wheezing, chills or hemoptysis; + Shortness of Breath  CARDIOVASCULAR: No chest pain, palpitations, passing out, dizziness, or leg swelling  GASTROINTESTINAL: No abdominal or epigastric pain. No nausea, vomiting, or hematemesis; No diarrhea or constipation. No melena or hematochezia.  GENITOURINARY: No dysuria, frequency, hematuria, or incontinence  NEUROLOGICAL: No headaches, memory loss, loss of strength, numbness, or tremors  SKIN: No itching, burning, rashes, or lesions   LYMPH Nodes: No enlarged glands  ENDOCRINE: No heat or cold intolerance; No hair loss  MUSCULOSKELETAL: No joint pain or swelling; No muscle, back, or extremity pain  PSYCHIATRIC: No depression, anxiety, mood swings, or difficulty sleeping  HEME/LYMPH: No easy bruising, or bleeding gums  ALLERGY AND IMMUNOLOGIC: No hives or eczema	    [x ] All others negative	  [ ] Unable to obtain    PHYSICAL EXAM:  T(C): 36.5 (04-18-23 @ 04:57), Max: 36.7 (04-17-23 @ 09:30)  HR: 84 (04-18-23 @ 04:57) (66 - 103)  BP: 100/53 (04-18-23 @ 04:57) (99/50 - 128/60)  RR: 18 (04-18-23 @ 04:57) (18 - 20)  SpO2: 96% (04-18-23 @ 04:57) (91% - 96%)  Wt(kg): --  I&O's Summary    17 Apr 2023 07:01  -  18 Apr 2023 07:00  --------------------------------------------------------  IN: 0 mL / OUT: 600 mL / NET: -600 mL        Appearance: Normal	  HEENT:   Normal oral mucosa, PERRL, EOMI	  Lymphatic: No lymphadenopathy  Cardiovascular: Normal S1 S2, No JVD, + murmurs, No edema  Respiratory rhonchi  Psychiatry: A & O x 3, Mood & affect appropriate  Gastrointestinal:  Soft, Non-tender, + BS	  Skin: No rashes, No ecchymoses, No cyanosis	  Neurologic: Non-focal  Extremities: Normal range of motion, No clubbing, cyanosis or edema  Vascular: Peripheral pulses palpable 2+ bilaterally    MEDICATIONS  (STANDING):  atorvastatin 40 milliGRAM(s) Oral at bedtime  buMETAnide Injectable 2 milliGRAM(s) IV Push two times a day  chlorhexidine 4% Liquid 1 Application(s) Topical <User Schedule>  heparin  Infusion.  Unit(s)/Hr (18 mL/Hr) IV Continuous <Continuous>  levothyroxine 137 MICROGram(s) Oral daily  metoprolol succinate  milliGRAM(s) Oral daily  nystatin Powder 1 Application(s) Topical two times a day      TELEMETRY: 	    ECG:  	  RADIOLOGY:  OTHER: 	  	  LABS:	 	    CARDIAC MARKERS:                                8.2    12.63 )-----------( 142      ( 18 Apr 2023 05:44 )             25.7     04-17    131<L>  |  93<L>  |  58<H>  ----------------------------<  116<H>  4.6   |  25  |  5.98<H>    Ca    7.9<L>      17 Apr 2023 07:10  Phos  5.7     04-17  Mg     1.8     04-17      proBNP:   Lipid Profile:   HgA1c:   TSH:   PTT - ( 18 Apr 2023 05:44 )  PTT:65.6 sec    -off ancef due to concern for AIN  -repeat bcx negative  -likely source is toe from recent nail clipping   -ERENDIRA negative for vegetations  -s/p PPM extraction and micra placement   -EP input noted  -also with low grade fevers  -picc  -6 weeks iv abx - day 18 of 42 of abx  -potential side effects of abx explained including GI, Cdiff, allergy issues, development of resistance, etc.  -potential side effects of PICC explained including bleeding and infection  -weekly cbc, bun/creatinine - fax [phone]  -OR cx negative  -dose vanco by level for now - redose vanco 1 gm iv x 1  -f/u renal biopsy.      Assessment and plan  ---------------------------  77y m pmh BPH, Chronic venous insufficiency, HLD Hypothyroidism, S/P CABG x 2, S/P mitral valve replacement, SP Status post angioplasty, Sp TKR rt, Anasarca Pt comes to ed c/o shortness of breath for past month w progressive worsening of anasarca w swelling of scrotum and diff urinating, Has been noncompliant on diuretics for past two weeks w 20lb weight gain over past month. No fever and chills, sputum, nvdc, cp. PE Adult male lying stretcher w maked swelling to josh lower extr, w pitting edema,  chest  scant josh crackles w slight wheeze and prolonged exp phase  chf acute on chronic sec to RV dysfunction  tele, AFib hr controlled  will adjust cardiac meds  +BC i bottle, MSSA , s/p ppm explant  s/p PPM and LEAD extraction  oob too chair  PICC line needs 6 weeks of abx  acute on chronic renal failure/ renal appreciated  gentle hydration, repeat lytes in the afternoon, plan discussed with ACP  acute renal failure/ hematuria/ac was held awaiting renal eval/urology called awaiting ct abdomen and pelvis has ordered since last night awaiting results  beta blocker sed to rate control and hx of cad, s/p CABG  no further hematuria, start iv heparin low dose observe closely so far so good  on HD ?need of bumex  vanco level noted  cardiomyopathy can not tolerate ACE/ARB  a.fib hr is well controlled  hematuria, ac is held urology called, fu cbc closely discussed with ACP mat re start if hematuria has resolved  nephrology/ ID appreciated  nephrology  recommendations  no further hematuria started on ac keep ptt 50 to 70  awaiting pathology

## 2023-04-18 NOTE — PROGRESS NOTE ADULT - ASSESSMENT
77  year old male    h/o  CAD s/p stent , asa.      A-fib/ PPM, , hypothyroid, and total left knee replacement    prior  PPM  interrogation  with  [facility]/   s/p  brief   bursts  of  afib/ , on prior visit   Ct chest, retrosternal hematoma.  mod left pl effusion    was  on bumex/  aldactone        *    admitted   for  worsening   pedal  edema          component  of  cellulitis   and  from  c/c  diastolic  chf,/  h/o  l/edema  of L  leg         pt  admits to  being  non complaint with  meds        *   h/o    c/c AFIB,   has  PPM         on eliquis     *    Anemia, , hb stable, had  been seen by  gi  eval dr joann sanchez in past     *    CAD,     cath, with 2 vessel disease,  s/p  CABG and  MVR  surg d r marni   *      h/o  Afib on    eliquis          c/c leg redness/  4 +  edema, on keflex, has  improved    *     echo,  on 7/2022,  ef  35/ new/ severe  TR          cath,   was non  obstructive    *    on synthroid          on asa/  torpol/  eliquis          crt  noted,   from  lasix// aldactone.  leg  swelling  is  lessening   *   MSSA  bacterinia           was  on iv ancef.  ,  rpt bcx    are  negative          explant pf  ppm  and  Micra  placement on  3/30/23. dr sunitha rocha   *    Echo,  ef  25,  mod  AI.  severe  TR        PICC  line  and  extended  course  of  ab.        BEULAH,  /   ?  AIN,  hence   ab was  switched   to iv vanco,  per  ID        follow  vanco  level  and dose  accordingly         Afib. on iv heparin        crt   was  9,9, now  on HD            s/p  renal bx on 4/ 14.        on iv heparin /  renal  bx, path,  pendinmg         card /  dr mikayla dick         rad< from: TTE with Doppler (w/Cont) (07.05.22 @ 09:42) >  Conclusions:  Endocardial visualization enhanced with intravenous  injection of Ultrasonic Enhancing Agent (Definity).  Severe left ventricular enlargement.  Estimated ejection fraction 35%. Diffuse hypokinesis, with  inferior akinesis.  Bioprosthetic mitral valve with normal function.  Right ventricular enlargement with decreased right  ventricular systolic function.  A device wire is noted in the right heart.  ------------------------------------------------------------------------  Confirmed on  7/5/2022 - 12:16:10 by Kristian    < end of copied text >

## 2023-04-18 NOTE — PROGRESS NOTE ADULT - ASSESSMENT
76 y/o male PMHx HTN, HLD, CAD s/p stent on ASA, A-fib, hypothyroid, CHF now presenting to the ED worsening SOB, DUNCAN, peripheral edema, scrotal edema and 20 pound unintentional weight gain over last month with MSSA bacteremia, HF    Kevin Olmos  Attending Physician   Division of Infectious Disease  Office #[phone]  Available on Microsoft Teams also  After 5pm/weekend or no response, call #[phone]

## 2023-04-18 NOTE — PROGRESS NOTE ADULT - SUBJECTIVE AND OBJECTIVE BOX
MARIELOS JANEE 77y [id number]    Patient is a 77y old  Male who presents with a chief complaint of Chart Reviewed, Events noted  " 77  year old male h/o  CAD s/p stent , asa. A-fib/ PPM, , hypothyroid, and total left knee replacement  prior  PPM  interrogation  with  [facility]/   s/p  brief   bursts  of  afib/ , on prior visit. Ct chest, retrosternal hematoma.  mod left pl effusion was  on bumex/  aldactone admitted with  worsening   pedal  edema  component  of  cellulitis   and  from  c/c  diastolic  chf,/  h/o  l/edema  of L  leg pt  admits to  being  non complaint with  meds"           (05 Apr 2023 11:57)      Follow Up/CC:  ID following for bacteremia    Interval History/ROS: no fever     Allergies    Myrbetriq (Hives)  tamsulosin (Hives)  levofloxacin (Hives)  alfuzosin (Hives)    Intolerances        ANTIMICROBIALS:      MEDICATIONS  (STANDING):  atorvastatin 40 milliGRAM(s) Oral at bedtime  buMETAnide Injectable 2 milliGRAM(s) IV Push two times a day  chlorhexidine 4% Liquid 1 Application(s) Topical <User Schedule>  heparin  Infusion.  Unit(s)/Hr (18 mL/Hr) IV Continuous <Continuous>  levothyroxine 137 MICROGram(s) Oral daily  metoprolol succinate  milliGRAM(s) Oral daily  nystatin Powder 1 Application(s) Topical two times a day    MEDICATIONS  (PRN):  acetaminophen     Tablet .. 650 milliGRAM(s) Oral every 6 hours PRN Mild Pain (1 - 3)  bisacodyl Suppository 10 milliGRAM(s) Rectal daily PRN Constipation  heparin   Injectable 4000 Unit(s) IV Push every 6 hours PRN For aPTT between 40 - 57  heparin   Injectable 8000 Unit(s) IV Push every 6 hours PRN For aPTT less than 40  sodium chloride 0.9% lock flush 10 milliLiter(s) IV Push every 1 hour PRN Pre/post blood products, medications, blood draw, and to maintain line patency        Vital Signs Last 24 Hrs  T(C): 36.5 (18 Apr 2023 04:57), Max: 36.7 (17 Apr 2023 19:15)  T(F): 97.7 (18 Apr 2023 04:57), Max: 98 (17 Apr 2023 19:15)  HR: 84 (18 Apr 2023 04:57) (66 - 103)  BP: 100/53 (18 Apr 2023 04:57) (99/50 - 128/60)  BP(mean): --  RR: 18 (18 Apr 2023 04:57) (18 - 20)  SpO2: 96% (18 Apr 2023 04:57) (91% - 96%)    Parameters below as of 18 Apr 2023 04:57  Patient On (Oxygen Delivery Method): room air        CBC Full  -  ( 18 Apr 2023 05:44 )  WBC Count : 12.63 K/uL  RBC Count : 2.80 M/uL  Hemoglobin : 8.2 g/dL  Hematocrit : 25.7 %  Platelet Count - Automated : 142 K/uL  Mean Cell Volume : 91.8 fl  Mean Cell Hemoglobin : 29.3 pg  Mean Cell Hemoglobin Concentration : 31.9 gm/dL  Auto Neutrophil # : x  Auto Lymphocyte # : x  Auto Monocyte # : x  Auto Eosinophil # : x  Auto Basophil # : x  Auto Neutrophil % : x  Auto Lymphocyte % : x  Auto Monocyte % : x  Auto Eosinophil % : x  Auto Basophil % : x    04-17    131<L>  |  93<L>  |  58<H>  ----------------------------<  116<H>  4.6   |  25  |  5.98<H>    Ca    7.9<L>      17 Apr 2023 07:10  Phos  5.7     04-17  Mg     1.8     04-17            MICROBIOLOGY:  Clean Catch Clean Catch (Midstream)  04-15-23   No growth  --  --      Clean Catch Clean Catch (Midstream)  04-04-23   No growth  --  --      .Other Other  03-30-23   No fungus isolated at 2 weeks.  --  --      .Blood Blood-Peripheral  03-28-23   No Growth Final  --  --      .Blood  03-25-23   Growth in anaerobic bottle: Staphylococcus aureus  ***Blood Panel PCR results on this specimen are available  approximately 3 hours after the Gram stain result.***  Gram stain, PCR, and/or culture results may not always  correspond due to difference in methodologies.  ************************************************************  This PCR assay was performed by multiplex PCR. This  Assay tests for 66 bacterial and resistance gene targets.  Please refer to the [facility] test directory  at [email or web address] for details.  --  Blood Culture PCR  Staphylococcus aureus      .Blood  03-25-23   No Growth Final  --  --              Vancomycin Level, Random: 16.6 ug/mL (04-18-23 @ 05:44)  v            RADIOLOGY

## 2023-04-19 LAB
APTT BLD: 61.6 SEC — HIGH (ref 27.5–35.5)
HCT VFR BLD CALC: 24.7 % — LOW (ref 39–50)
HGB BLD-MCNC: 8 G/DL — LOW (ref 13–17)
MCHC RBC-ENTMCNC: 29.6 PG — SIGNIFICANT CHANGE UP (ref 27–34)
MCHC RBC-ENTMCNC: 32.4 GM/DL — SIGNIFICANT CHANGE UP (ref 32–36)
MCV RBC AUTO: 91.5 FL — SIGNIFICANT CHANGE UP (ref 80–100)
NRBC # BLD: 0 /100 WBCS — SIGNIFICANT CHANGE UP (ref 0–0)
PLATELET # BLD AUTO: 151 K/UL — SIGNIFICANT CHANGE UP (ref 150–400)
RBC # BLD: 2.7 M/UL — LOW (ref 4.2–5.8)
RBC # FLD: 14.6 % — HIGH (ref 10.3–14.5)
WBC # BLD: 12.01 K/UL — HIGH (ref 3.8–10.5)
WBC # FLD AUTO: 12.01 K/UL — HIGH (ref 3.8–10.5)

## 2023-04-19 PROCEDURE — 99232 SBSQ HOSP IP/OBS MODERATE 35: CPT | Mod: GC

## 2023-04-19 RX ORDER — MIDODRINE HYDROCHLORIDE 2.5 MG/1
10 TABLET ORAL
Refills: 0 | Status: DISCONTINUED | OUTPATIENT
Start: 2023-04-19 | End: 2023-04-23

## 2023-04-19 RX ORDER — SENNA PLUS 8.6 MG/1
2 TABLET ORAL AT BEDTIME
Refills: 0 | Status: DISCONTINUED | OUTPATIENT
Start: 2023-04-19 | End: 2023-05-01

## 2023-04-19 RX ADMIN — NYSTATIN CREAM 1 APPLICATION(S): 100000 CREAM TOPICAL at 17:20

## 2023-04-19 RX ADMIN — CHLORHEXIDINE GLUCONATE 1 APPLICATION(S): 213 SOLUTION TOPICAL at 09:02

## 2023-04-19 RX ADMIN — HEPARIN SODIUM 2200 UNIT(S)/HR: 5000 INJECTION INTRAVENOUS; SUBCUTANEOUS at 03:15

## 2023-04-19 RX ADMIN — SENNA PLUS 2 TABLET(S): 8.6 TABLET ORAL at 21:26

## 2023-04-19 RX ADMIN — Medication 137 MICROGRAM(S): at 05:31

## 2023-04-19 RX ADMIN — HEPARIN SODIUM 2200 UNIT(S)/HR: 5000 INJECTION INTRAVENOUS; SUBCUTANEOUS at 07:03

## 2023-04-19 RX ADMIN — ATORVASTATIN CALCIUM 40 MILLIGRAM(S): 80 TABLET, FILM COATED ORAL at 21:26

## 2023-04-19 RX ADMIN — MIDODRINE HYDROCHLORIDE 10 MILLIGRAM(S): 2.5 TABLET ORAL at 08:57

## 2023-04-19 RX ADMIN — HEPARIN SODIUM 2200 UNIT(S)/HR: 5000 INJECTION INTRAVENOUS; SUBCUTANEOUS at 17:35

## 2023-04-19 RX ADMIN — Medication 5 MILLIGRAM(S): at 17:19

## 2023-04-19 RX ADMIN — NYSTATIN CREAM 1 APPLICATION(S): 100000 CREAM TOPICAL at 05:30

## 2023-04-19 RX ADMIN — Medication 250 MILLIGRAM(S): at 17:36

## 2023-04-19 RX ADMIN — BUMETANIDE 2 MILLIGRAM(S): 0.25 INJECTION INTRAMUSCULAR; INTRAVENOUS at 05:30

## 2023-04-19 NOTE — PROGRESS NOTE ADULT - PROBLEM SELECTOR PLAN 1
Pt. with oliguric BEULAH. Differential also includes AIN perhaps in setting of Cefazolin? vs overdiuresis. On review of [facility] pt noted to have a Scr of 1 on admission which since 4/1 has progressively increased to 7 most recently. UA with blood and also proteinuria of 1.0g.       Serological work up negative except JAGRUTI positive with homogenous pattern. Renal sonogram demonstrating a horseshoe kidney without evidence of hydronephrosis or renal calculi.   Pt underwent kidney biopsy by IR on 4/14. Will follow up final results.      Pt underwent first session HD on 4/10 via RIJ non tunneled HD catheter. Last Hd done on 4/17. Plan for HD again today. Monitor labs and urine output. Avoid nephrotoxins. Dose medications as per eGFR.

## 2023-04-19 NOTE — PROGRESS NOTE ADULT - SUBJECTIVE AND OBJECTIVE BOX
Date of Service: 04-19-23 @ 07:04           CARDIOLOGY     PROGRESS  NOTE   ________________________________________________    CHIEF COMPLAINT:Patient is a 77y old  Male who presents with a chief complaint of Chart Reviewed, Events noted  " 77  year old male h/o  CAD s/p stent , asa. A-fib/ PPM, , hypothyroid, and total left knee replacement  prior  PPM  interrogation  with  WCT/   s/p  brief   bursts  of  afib/ , on prior visit. Ct chest, retrosternal hematoma.  mod left pl effusion was  on bumex/  aldactone admitted with  worsening   pedal  edema  component  of  cellulitis   and  from  c/c  diastolic  chf,/  h/o  l/edema  of L  leg pt  admits to  being  non complaint with  meds"   events noted with hypotension yesterday    	  REVIEW OF SYSTEMS:  CONSTITUTIONAL: No fever, weight loss, or fatigue  EYES: No eye pain, visual disturbances, or discharge  ENT:  No difficulty hearing, tinnitus, vertigo; No sinus or throat pain  NECK: No pain or stiffness  RESPIRATORY: No cough, wheezing, chills or hemoptysis; No Shortness of Breath  CARDIOVASCULAR: No chest pain, palpitations, passing out, dizziness, or leg swelling  GASTROINTESTINAL: No abdominal or epigastric pain. No nausea, vomiting, or hematemesis; No diarrhea or constipation. No melena or hematochezia.  GENITOURINARY: No dysuria, frequency, hematuria, or incontinence  NEUROLOGICAL: No headaches, memory loss, loss of strength, numbness, or tremors  SKIN: No itching, burning, rashes, or lesions   LYMPH Nodes: No enlarged glands  ENDOCRINE: No heat or cold intolerance; No hair loss  MUSCULOSKELETAL: No joint pain or swelling; No muscle, back, or extremity pain  PSYCHIATRIC: No depression, anxiety, mood swings, or difficulty sleeping  HEME/LYMPH: No easy bruising, or bleeding gums  ALLERGY AND IMMUNOLOGIC: No hives or eczema	    [x ] All others negative	  [ ] Unable to obtain    PHYSICAL EXAM:  T(C): 37 (04-19-23 @ 05:00), Max: 37 (04-19-23 @ 05:00)  HR: 92 (04-19-23 @ 05:00) (54 - 115)  BP: 99/65 (04-19-23 @ 05:00) (86/53 - 123/66)  RR: 18 (04-19-23 @ 05:00) (18 - 18)  SpO2: 96% (04-19-23 @ 05:00) (92% - 98%)  Wt(kg): --  I&O's Summary    18 Apr 2023 07:01  -  19 Apr 2023 07:00  --------------------------------------------------------  IN: 560 mL / OUT: 550 mL / NET: 10 mL        Appearance: Normal	  HEENT:   Normal oral mucosa, PERRL, EOMI	  Lymphatic: No lymphadenopathy  Cardiovascular: Normal S1 S2, No JVD, + murmurs, No edema  Respiratory: rhoncho  Psychiatry: A & O x 3, Mood & affect appropriate  Gastrointestinal:  Soft, Non-tender, + BS	  Skin: No rashes, No ecchymoses, No cyanosis	  Neurologic: Non-focal  Extremities: Normal range of motion, No clubbing, cyanosis or edema  Vascular: Peripheral pulses palpable 2+ bilaterally    MEDICATIONS  (STANDING):  atorvastatin 40 milliGRAM(s) Oral at bedtime  buMETAnide Injectable 2 milliGRAM(s) IV Push daily  chlorhexidine 4% Liquid 1 Application(s) Topical <User Schedule>  heparin  Infusion.  Unit(s)/Hr (18 mL/Hr) IV Continuous <Continuous>  levothyroxine 137 MICROGram(s) Oral daily  metoprolol succinate  milliGRAM(s) Oral daily  nystatin Powder 1 Application(s) Topical two times a day  vancomycin  IVPB 1000 milliGRAM(s) IV Intermittent once      TELEMETRY: 	    ECG:  	  RADIOLOGY:  OTHER: 	  	  LABS:	 	    CARDIAC MARKERS:                                8.2    12.63 )-----------( 142      ( 18 Apr 2023 05:44 )             25.7     04-17    131<L>  |  93<L>  |  58<H>  ----------------------------<  116<H>  4.6   |  25  |  5.98<H>    Ca    7.9<L>      17 Apr 2023 07:10  Phos  5.7     04-17  Mg     1.8     04-17      proBNP:   Lipid Profile:   HgA1c:   TSH:   PTT - ( 19 Apr 2023 02:47 )  PTT:61.6 sec    ·  Plan: Pt. with oliguric BEULAH. Differential also includes AIN perhaps in setting of Cefazolin? vs overdiuresis. On review of [facility] pt noted to have a Scr of 1 on admission which since 4/1 has progressively increased to 7 most recently. UA with blood (suspected from delgadillo catheter insertion) and also proteinuria of 1.0g (initial elevation likely from gross hematuria and over-estimated). Thus far JAGRUTI positive with homogenous pattern,   Serological work up negative so far. Renal sonogram demonstrating a horseshoe kidney without evidence of hydronephrosis or renal calculi. Pt underwent first session HD on 4/10 via RIJ non tunneled HD catheter. Last Hd done on 4/14.  plan for HD again today. Pt underwent kidney biopsy by IR on 4/1/4. Will f/u results. Monitor labs and urine output. Avoid nephrotoxins. Dose medications as per eGFR.    Assessment and plan  ---------------------------  77y m pmh BPH, Chronic venous insufficiency, HLD Hypothyroidism, S/P CABG x 2, S/P mitral valve replacement, SP Status post angioplasty, Sp TKR rt, Anasarca Pt comes to ed c/o shortness of breath for past month w progressive worsening of anasarca w swelling of scrotum and diff urinating, Has been noncompliant on diuretics for past two weeks w 20lb weight gain over past month. No fever and chills, sputum, nvdc, cp. PE Adult male lying stretcher w maked swelling to josh lower extr, w pitting edema,  chest  scant josh crackles w slight wheeze and prolonged exp phase  chf acute on chronic sec to RV dysfunction  tele, AFib hr controlled  will adjust cardiac meds  +BC i bottle, MSSA , s/p ppm explant  s/p PPM and LEAD extraction  oob too chair  PICC line needs 6 weeks of abx  acute on chronic renal failure/ renal appreciated  acute renal failure/ hematuria/ac was held awaiting renal eval/urology called awaiting ct abdomen and pelvis has ordered since last night awaiting results  beta blocker sed to rate control and hx of cad, s/p CABG  no further hematuria, start iv heparin low dose observe closely so far so good  on HD ?need of bumex  vanco level noted  cardiomyopathy can not tolerate ACE/ARB  a.fib hr is well controlled  hematuria, ac is held urology called, fu cbc closely discussed with ACP mat re start if hematuria has resolved  no further hematuria started on ac keep ptt 50 to 70  awaiting pathology  hypotension bumex decreased to daily  will add midodrine specially if needs hd  may consider to re start on Eliquis

## 2023-04-19 NOTE — PROGRESS NOTE ADULT - SUBJECTIVE AND OBJECTIVE BOX
[facility] DIVISION OF KIDNEY DISEASES AND HYPERTENSION -- FOLLOW UP NOTE  --------------------------------------------------------------------------------  HPI: 77 year old male with PMH of Afib, CAD s/p CABG, BPH, HLD, HTN, and hypothyroidism who presented to the hospital on 3/25 with complaints of shortness of breath and wiley gain. The patient had been nonadherent with his diuretic regimen as outpatient and presented with acute on chronic decompensated heart failure. The patient was initiated on IV diuresis with significant improvement in volume status with approximately 15lb weight loss. Hospital course was further complicated by MSSA bacteremia requiring pacemaker extraction with micra implant and currently being managed with IVC abx with PICC line in place. The nephrology team was consulted for oliguric BEULAH. On review of [facility] pt noted to have a SCr of 1 on admission which since 4/1 has progressively increased to 9.66 initiated on HD 4/10. Last HD done on 4/17. Underwent kidney biopsy on 4/14.     24 hour events/subjective:  Pt. seen and examined this morning. He denies any headaches, fevers/chills, chest pain, and abdominal pain.    PAST HISTORY  --------------------------------------------------------------------------------  No significant changes to PMH, PSH, FHx, SHx, unless otherwise noted    ALLERGIES & MEDICATIONS  --------------------------------------------------------------------------------  Allergies    Myrbetriq (Hives)  tamsulosin (Hives)  levofloxacin (Hives)  alfuzosin (Hives)    Intolerances    Standing Inpatient Medications  atorvastatin 40 milliGRAM(s) Oral at bedtime  buMETAnide Injectable 2 milliGRAM(s) IV Push daily  chlorhexidine 4% Liquid 1 Application(s) Topical <User Schedule>  heparin  Infusion.  Unit(s)/Hr IV Continuous <Continuous>  levothyroxine 137 MICROGram(s) Oral daily  metoprolol succinate  milliGRAM(s) Oral daily  midodrine. 10 milliGRAM(s) Oral <User Schedule>  nystatin Powder 1 Application(s) Topical two times a day  vancomycin  IVPB 1000 milliGRAM(s) IV Intermittent once    PRN Inpatient Medications  acetaminophen     Tablet .. 650 milliGRAM(s) Oral every 6 hours PRN  bisacodyl Suppository 10 milliGRAM(s) Rectal daily PRN  heparin   Injectable 8000 Unit(s) IV Push every 6 hours PRN  heparin   Injectable 4000 Unit(s) IV Push every 6 hours PRN  sodium chloride 0.9% lock flush 10 milliLiter(s) IV Push every 1 hour PRN    REVIEW OF SYSTEMS  --------------------------------------------------------------------------------  Gen: No fevers   Respiratory: No dyspnea  CV: No chest pain  GI: No abdominal pain  : No dysuria  MSK: No  edema  Neuro: no dizziness   All other systems were reviewed and are negative, except as noted.    VITALS/PHYSICAL EXAM  --------------------------------------------------------------------------------  T(C): 36.5 (04-19-23 @ 08:43), Max: 37 (04-19-23 @ 05:00)  HR: 93 (04-19-23 @ 08:43) (54 - 115)  BP: 100/66 (04-19-23 @ 08:43) (86/53 - 123/66)  RR: 19 (04-19-23 @ 08:43) (18 - 19)  SpO2: 93% (04-19-23 @ 08:43) (92% - 98%)  Wt(kg): --    04-18-23 @ 07:01  -  04-19-23 @ 07:00  --------------------------------------------------------  IN: 824 mL / OUT: 650 mL / NET: 174 mL    Physical Exam:  	Gen: NAD  	HEENT: MMM  	Pulm: CTA B/L  	CV: S1S2  	Abd: Soft, +BS   	Ext: +LE edema B/L  	Neuro: Awake  	Skin: Warm and dry  	Vascular access: RIJ non tunneled HD catheter    LABS/STUDIES  --------------------------------------------------------------------------------              8.0    12.01 >-----------<  151      [04-19-23 @ 06:54]              24.7       PTT: 61.6       [04-19-23 @ 02:47]    Creatinine Trend:  SCr 5.98 [04-17 @ 07:10]  SCr 5.01 [04-16 @ 07:04]  SCr 4.14 [04-15 @ 06:46]  SCr 5.99 [04-14 @ 06:55]  SCr 4.54 [04-13 @ 07:54]    Urinalysis - [04-13-23 @ 07:54]      Color DARK BROWN / Appearance Turbid / SG 1.026 / pH 6.5      Gluc 100 mg/dL / Ketone Trace  / Bili Negative / Urobili Negative       Blood Large / Protein 300 mg/dL / Leuk Est Large / Nitrite Negative      RBC >50 / WBC 3-5 / Hyaline 0-2 / Gran  / Sq Epi  / Non Sq Epi  / Bacteria Negative    HBsAg Nonreact      [04-06-23 @ 11:29]  HCV 0.15, Nonreact      [04-06-23 @ 11:46]  HIV Nonreact      [04-06-23 @ 11:32]    JAGRUTI: titer 1:160, pattern Homogeneous      [04-06-23 @ 11:29]  C3 Complement 126      [04-06-23 @ 11:47]  C4 Complement 25      [04-06-23 @ 11:47]  ANCA: cANCA Negative, pANCA Negative, atypical ANCA Negative      [04-06-23 @ 11:29]  anti-GBM <0.2      [04-06-23 @ 11:46]  PLA2R: BIANCA 4.2, IFA --      [04-06-23 @ 11:46]  Immunofixation Serum:   Weak IgA Lambda Band Identified    Reference Range: None Detected      [04-06-23 @ 11:47]  SPEP Interpretation: Weak Beta-Migrating Paraprotein Identified      [04-06-23 @ 11:47]

## 2023-04-19 NOTE — PROGRESS NOTE ADULT - SUBJECTIVE AND OBJECTIVE BOX
date of service: 04-19-23 @ 09:13  afebrile  REVIEW OF SYSTEMS:  CONSTITUTIONAL: No fever,  no  weight loss  ENT:  No  tinnitus,   no   vertigo  NECK: No pain or stiffness  RESPIRATORY: No cough, wheezing, chills or hemoptysis;    No Shortness of Breath  CARDIOVASCULAR: No chest pain, palpitations, dizziness  GASTROINTESTINAL: No abdominal or epigastric pain. No nausea, vomiting, or hematemesis; No diarrhea  No melena or hematochezia.  GENITOURINARY: No dysuria, frequency, hematuria, or incontinence  NEUROLOGICAL: No headaches  SKIN: No itching,  no   rash  LYMPH Nodes: No enlarged glands  ENDOCRINE: No heat or cold intolerance  MUSCULOSKELETAL: No joint pain or swelling  PSYCHIATRIC: No depression, anxiety  HEME/LYMPH: No easy bruising, or bleeding gums  ALLERGY AND IMMUNOLOGIC: No hives or eczema	    MEDICATIONS  (STANDING):  atorvastatin 40 milliGRAM(s) Oral at bedtime  buMETAnide Injectable 2 milliGRAM(s) IV Push daily  chlorhexidine 4% Liquid 1 Application(s) Topical <User Schedule>  heparin  Infusion.  Unit(s)/Hr (18 mL/Hr) IV Continuous <Continuous>  levothyroxine 137 MICROGram(s) Oral daily  metoprolol succinate  milliGRAM(s) Oral daily  midodrine. 10 milliGRAM(s) Oral <User Schedule>  nystatin Powder 1 Application(s) Topical two times a day  vancomycin  IVPB 1000 milliGRAM(s) IV Intermittent once    MEDICATIONS  (PRN):  acetaminophen     Tablet .. 650 milliGRAM(s) Oral every 6 hours PRN Mild Pain (1 - 3)  bisacodyl Suppository 10 milliGRAM(s) Rectal daily PRN Constipation  heparin   Injectable 8000 Unit(s) IV Push every 6 hours PRN For aPTT less than 40  heparin   Injectable 4000 Unit(s) IV Push every 6 hours PRN For aPTT between 40 - 57  sodium chloride 0.9% lock flush 10 milliLiter(s) IV Push every 1 hour PRN Pre/post blood products, medications, blood draw, and to maintain line patency      Vital Signs Last 24 Hrs  T(C): 36.5 (19 Apr 2023 08:43), Max: 37 (19 Apr 2023 05:00)  T(F): 97.7 (19 Apr 2023 08:43), Max: 98.6 (19 Apr 2023 05:00)  HR: 93 (19 Apr 2023 08:43) (54 - 115)  BP: 100/66 (19 Apr 2023 08:43) (86/53 - 123/66)  BP(mean): 81 (18 Apr 2023 11:48) (81 - 81)  RR: 19 (19 Apr 2023 08:43) (18 - 19)  SpO2: 93% (19 Apr 2023 08:43) (92% - 98%)    Parameters below as of 19 Apr 2023 08:43  Patient On (Oxygen Delivery Method): nasal cannula      CAPILLARY BLOOD GLUCOSE        I&O's Summary    18 Apr 2023 07:01  -  19 Apr 2023 07:00  --------------------------------------------------------  IN: 824 mL / OUT: 650 mL / NET: 174 mL          Appearance: Normal	  HEENT:   Normal oral mucosa, PERRL, EOMI	  Lymphatic: No lymphadenopathy  Cardiovascular: Normal S1 S2, No JVD  Respiratory: Lungs clear to auscultation	  Gastrointestinal:  Soft, Non-tender, + BS	  Skin: No rash, No ecchymoses	  Extremities: less  edema    LABS:                        8.0    12.01 )-----------( 151      ( 19 Apr 2023 06:54 )             24.7           PTT - ( 19 Apr 2023 02:47 )  PTT:61.6 sec                        Consultant(s) Notes Reviewed:      Care Discussed with Consultants/Other Providers:

## 2023-04-19 NOTE — PROGRESS NOTE ADULT - ASSESSMENT
77  year old male    h/o  CAD s/p stent , asa.      A-fib/ PPM, , hypothyroid, and total left knee replacement    prior  PPM  interrogation  with  WCT/   s/p  brief   bursts  of  afib/ , on prior visit   Ct chest, retrosternal hematoma.  mod left pl effusion    was  on bumex/  aldactone        *    admitted   for  worsening   pedal  edema          component  of  cellulitis   and  from  c/c  diastolic  chf,/  h/o  l/edema  of L  leg         pt  admits to  being  non complaint with  meds        *   h/o    c/c AFIB,   has  PPM         on eliquis     *    Anemia, , hb stable, had  been seen by  gi  eval dr joann sanchez in past     *    CAD,     cath, with 2 vessel disease,  s/p  CABG and  MVR  surg d r marni   *      h/o  Afib on    eliquis          c/c leg redness/  4 +  edema, on keflex, has  improved    *     echo,  on 7/2022,  ef  35/ new/ severe  TR          cath,   was non  obstructive    *    on synthroid          on asa/  torpol/  eliquis          crt  noted,   from  lasix// aldactone.  leg  swelling  is  lessening   *   MSSA  bacterinia           was  on iv ancef.  ,  rpt bcx    are  negative          explant pf  ppm  and  Micra  placement on  3/30/23. dr sunitha rocha   *    Echo,  ef  25,  mod  AI.  severe  TR        PICC  line  and  extended  course  of  ab.        BEULAH,  /   ?  AIN,  hence   ab was  switched   to iv vanco,  per  ID         Afib. on iv heparin        crt   was  9,9, now  on HD            s/p  renal bx on 4/ 14.        on iv heparin /  renal  bx, path,  pending /  on iv heparin    pt is  euvolemic         card /  dr mikayla dick         rad< from: TTE with Doppler (w/Cont) (07.05.22 @ 09:42) >  Conclusions:  Endocardial visualization enhanced with intravenous  injection of Ultrasonic Enhancing Agent (Definity).  Severe left ventricular enlargement.  Estimated ejection fraction 35%. Diffuse hypokinesis, with  inferior akinesis.  Bioprosthetic mitral valve with normal function.  Right ventricular enlargement with decreased right  ventricular systolic function.  A device wire is noted in the right heart.  ------------------------------------------------------------------------  Confirmed on  7/5/2022 - 12:16:10 by Kristian    < end of copied text >

## 2023-04-19 NOTE — PROGRESS NOTE ADULT - ATTENDING COMMENTS
77 year old male with PMH of Afib, CAD s/p CABG, BPH, HLD, HTN, and hypothyroidism who presented to the hospital on 3/25 with complaints of shortness of breath and wiley gain. The patient had been nonadherent with his diuretic regimen as outpatient and presented with acute on chronic decompensated heart failure. The patient was initiated on IV diuresis with significant improvement in volume status with approximately 15lb weight loss. Hospital course was further complicated by MSSA bacteremia requiring pacemaker extraction with micra implant and currently being managed with IVC abx with PICC line in place. The nephrology team was consulted for oliguric BEULAH. On review of [facility] pt noted to have a SCr of 1 on admission which since 4/1 has progressively increased to 9.66 initiated on HD 4/10. Last HD done on 4/14. Underwent kidney biopsy on 4/14. On HD today, no new complaints  1.  ARF--now HD dependent.  W/U hopefully being clarified by renal bx results.  Preliminary NOT indicative of major ntreatable dx   2.  Hypotension on HD--may need to lower UF goals.  HD orders reviewed with fellow, HD RN  discussed with med team  Bg Valerio MD  contact me on TEAMS

## 2023-04-20 LAB
ANION GAP SERPL CALC-SCNC: 12 MMOL/L — SIGNIFICANT CHANGE UP (ref 5–17)
APTT BLD: 57.8 SEC — HIGH (ref 27.5–35.5)
BUN SERPL-MCNC: 37 MG/DL — HIGH (ref 7–23)
CALCIUM SERPL-MCNC: 7.9 MG/DL — LOW (ref 8.4–10.5)
CHLORIDE SERPL-SCNC: 96 MMOL/L — SIGNIFICANT CHANGE UP (ref 96–108)
CO2 SERPL-SCNC: 27 MMOL/L — SIGNIFICANT CHANGE UP (ref 22–31)
CREAT SERPL-MCNC: 4.09 MG/DL — HIGH (ref 0.5–1.3)
EGFR: 14 ML/MIN/1.73M2 — LOW
GLUCOSE SERPL-MCNC: 111 MG/DL — HIGH (ref 70–99)
HCT VFR BLD CALC: 25.6 % — LOW (ref 39–50)
HGB BLD-MCNC: 8.1 G/DL — LOW (ref 13–17)
MCHC RBC-ENTMCNC: 29 PG — SIGNIFICANT CHANGE UP (ref 27–34)
MCHC RBC-ENTMCNC: 31.6 GM/DL — LOW (ref 32–36)
MCV RBC AUTO: 91.8 FL — SIGNIFICANT CHANGE UP (ref 80–100)
NRBC # BLD: 0 /100 WBCS — SIGNIFICANT CHANGE UP (ref 0–0)
PLATELET # BLD AUTO: 165 K/UL — SIGNIFICANT CHANGE UP (ref 150–400)
POTASSIUM SERPL-MCNC: 4.1 MMOL/L — SIGNIFICANT CHANGE UP (ref 3.5–5.3)
POTASSIUM SERPL-SCNC: 4.1 MMOL/L — SIGNIFICANT CHANGE UP (ref 3.5–5.3)
RBC # BLD: 2.79 M/UL — LOW (ref 4.2–5.8)
RBC # FLD: 14.4 % — SIGNIFICANT CHANGE UP (ref 10.3–14.5)
SODIUM SERPL-SCNC: 135 MMOL/L — SIGNIFICANT CHANGE UP (ref 135–145)
VANCOMYCIN FLD-MCNC: 21 UG/ML — SIGNIFICANT CHANGE UP
WBC # BLD: 11.29 K/UL — HIGH (ref 3.8–10.5)
WBC # FLD AUTO: 11.29 K/UL — HIGH (ref 3.8–10.5)

## 2023-04-20 RX ADMIN — HEPARIN SODIUM 2200 UNIT(S)/HR: 5000 INJECTION INTRAVENOUS; SUBCUTANEOUS at 06:03

## 2023-04-20 RX ADMIN — HEPARIN SODIUM 2200 UNIT(S)/HR: 5000 INJECTION INTRAVENOUS; SUBCUTANEOUS at 22:14

## 2023-04-20 RX ADMIN — ATORVASTATIN CALCIUM 40 MILLIGRAM(S): 80 TABLET, FILM COATED ORAL at 22:13

## 2023-04-20 RX ADMIN — NYSTATIN CREAM 1 APPLICATION(S): 100000 CREAM TOPICAL at 05:46

## 2023-04-20 RX ADMIN — Medication 137 MICROGRAM(S): at 05:45

## 2023-04-20 RX ADMIN — BUMETANIDE 2 MILLIGRAM(S): 0.25 INJECTION INTRAMUSCULAR; INTRAVENOUS at 05:46

## 2023-04-20 RX ADMIN — Medication 100 MILLIGRAM(S): at 05:45

## 2023-04-20 RX ADMIN — HEPARIN SODIUM 2200 UNIT(S)/HR: 5000 INJECTION INTRAVENOUS; SUBCUTANEOUS at 17:41

## 2023-04-20 RX ADMIN — NYSTATIN CREAM 1 APPLICATION(S): 100000 CREAM TOPICAL at 17:16

## 2023-04-20 RX ADMIN — CHLORHEXIDINE GLUCONATE 1 APPLICATION(S): 213 SOLUTION TOPICAL at 08:28

## 2023-04-20 RX ADMIN — HEPARIN SODIUM 2200 UNIT(S)/HR: 5000 INJECTION INTRAVENOUS; SUBCUTANEOUS at 08:27

## 2023-04-20 RX ADMIN — Medication 5 MILLIGRAM(S): at 06:00

## 2023-04-20 NOTE — PROGRESS NOTE ADULT - SUBJECTIVE AND OBJECTIVE BOX
date of service: 04-20-23 @ 10:59  afebrile  REVIEW OF SYSTEMS:  CONSTITUTIONAL: No fever,  no  weight loss  ENT:  No  tinnitus,   no   vertigo  NECK: No pain or stiffness  RESPIRATORY: No cough, wheezing, chills or hemoptysis;    No Shortness of Breath  CARDIOVASCULAR: No chest pain, palpitations, dizziness  GASTROINTESTINAL: No abdominal or epigastric pain. No nausea, vomiting, or hematemesis; No diarrhea  No melena or hematochezia.  GENITOURINARY: No dysuria, frequency, hematuria, or incontinence  NEUROLOGICAL: No headaches  SKIN: No itching,  no   rash  LYMPH Nodes: No enlarged glands  ENDOCRINE: No heat or cold intolerance  MUSCULOSKELETAL: No joint pain or swelling  PSYCHIATRIC: No depression, anxiety  HEME/LYMPH: No easy bruising, or bleeding gums  ALLERGY AND IMMUNOLOGIC: No hives or eczema	    MEDICATIONS  (STANDING):  atorvastatin 40 milliGRAM(s) Oral at bedtime  bisacodyl 5 milliGRAM(s) Oral every 12 hours  buMETAnide Injectable 2 milliGRAM(s) IV Push daily  chlorhexidine 4% Liquid 1 Application(s) Topical <User Schedule>  heparin  Infusion.  Unit(s)/Hr (18 mL/Hr) IV Continuous <Continuous>  levothyroxine 137 MICROGram(s) Oral daily  metoprolol succinate  milliGRAM(s) Oral daily  midodrine. 10 milliGRAM(s) Oral <User Schedule>  nystatin Powder 1 Application(s) Topical two times a day  senna 2 Tablet(s) Oral at bedtime    MEDICATIONS  (PRN):  acetaminophen     Tablet .. 650 milliGRAM(s) Oral every 6 hours PRN Mild Pain (1 - 3)  heparin   Injectable 8000 Unit(s) IV Push every 6 hours PRN For aPTT less than 40  heparin   Injectable 4000 Unit(s) IV Push every 6 hours PRN For aPTT between 40 - 57  sodium chloride 0.9% lock flush 10 milliLiter(s) IV Push every 1 hour PRN Pre/post blood products, medications, blood draw, and to maintain line patency      Vital Signs Last 24 Hrs  T(C): 36.8 (20 Apr 2023 05:04), Max: 37 (19 Apr 2023 21:03)  T(F): 98.3 (20 Apr 2023 05:04), Max: 98.6 (19 Apr 2023 21:03)  HR: 95 (20 Apr 2023 05:04) (72 - 106)  BP: 109/70 (20 Apr 2023 05:04) (103/67 - 116/74)  BP(mean): --  RR: 18 (20 Apr 2023 05:04) (18 - 18)  SpO2: 92% (20 Apr 2023 05:04) (92% - 100%)    Parameters below as of 20 Apr 2023 05:04  Patient On (Oxygen Delivery Method): room air      CAPILLARY BLOOD GLUCOSE        I&O's Summary    19 Apr 2023 07:01  -  20 Apr 2023 07:00  --------------------------------------------------------  IN: 1024 mL / OUT: 1230 mL / NET: -206 mL          Appearance: Normal	  HEENT:   Normal oral mucosa, PERRL, EOMI	  Lymphatic: No lymphadenopathy  Cardiovascular: Normal S1 S2, No JVD  Respiratory: Lungs clear to auscultation	  Gastrointestinal:  Soft, Non-tender, + BS	  Skin: No rash, No ecchymoses	  Extremities:     LABS:                        8.1    11.29 )-----------( 165      ( 20 Apr 2023 07:18 )             25.6     04-20    135  |  96  |  37<H>  ----------------------------<  111<H>  4.1   |  27  |  4.09<H>    Ca    7.9<L>      20 Apr 2023 07:20      PTT - ( 20 Apr 2023 07:19 )  PTT:57.8 sec                        Consultant(s) Notes Reviewed:      Care Discussed with Consultants/Other Providers:

## 2023-04-20 NOTE — PROGRESS NOTE ADULT - SUBJECTIVE AND OBJECTIVE BOX
Date of Service: 04-20-23 @ 07:45           CARDIOLOGY     PROGRESS  NOTE   ________________________________________________    CHIEF COMPLAINT:Patient is a 77y old  Male who presents with a chief complaint of Chart Reviewed, Events noted  " 77  year old male h/o  CAD s/p stent , asa. A-fib/ PPM, , hypothyroid, and total left knee replacement  prior  PPM  interrogation  with  WCT/   s/p  brief   bursts  of  afib/ , on prior visit. Ct chest, retrosternal hematoma.  mod left pl effusion was  on bumex/  aldactone admitted with  worsening   pedal  edema  component  of  cellulitis   and  from  c/c  diastolic  chf,/  h/o  l/edema  of L  leg pt  admits to  being  non complaint with  meds"   no complain    	  REVIEW OF SYSTEMS:  CONSTITUTIONAL: No fever, weight loss, or fatigue  EYES: No eye pain, visual disturbances, or discharge  ENT:  No difficulty hearing, tinnitus, vertigo; No sinus or throat pain  NECK: No pain or stiffness  RESPIRATORY: No cough, wheezing, chills or hemoptysis; + mildv  Shortness of Breath  CARDIOVASCULAR: No chest pain, palpitations, passing out, dizziness, or leg swelling  GASTROINTESTINAL: No abdominal or epigastric pain. No nausea, vomiting, or hematemesis; No diarrhea or constipation. No melena or hematochezia.  GENITOURINARY: No dysuria, frequency, hematuria, or incontinence  NEUROLOGICAL: No headaches, memory loss, loss of strength, numbness, or tremors  SKIN: No itching, burning, rashes, or lesions   LYMPH Nodes: No enlarged glands  ENDOCRINE: No heat or cold intolerance; No hair loss  MUSCULOSKELETAL: No joint pain or swelling; No muscle, back, or extremity pain  PSYCHIATRIC: No depression, anxiety, mood swings, or difficulty sleeping  HEME/LYMPH: No easy bruising, or bleeding gums  ALLERGY AND IMMUNOLOGIC: No hives or eczema	    [ ] All others negative	  [ ] Unable to obtain    PHYSICAL EXAM:  T(C): 36.8 (04-20-23 @ 05:04), Max: 37 (04-19-23 @ 21:03)  HR: 95 (04-20-23 @ 05:04) (72 - 106)  BP: 109/70 (04-20-23 @ 05:04) (97/70 - 116/74)  RR: 18 (04-20-23 @ 05:04) (18 - 19)  SpO2: 92% (04-20-23 @ 05:04) (92% - 100%)  Wt(kg): --  I&O's Summary    19 Apr 2023 07:01  -  20 Apr 2023 07:00  --------------------------------------------------------  IN: 1024 mL / OUT: 1230 mL / NET: -206 mL        Appearance: Normal	  HEENT:   Normal oral mucosa, PERRL, EOMI	  Lymphatic: No lymphadenopathy  Cardiovascular: Normal S1 S2, No JVD, + murmurs, +mild edema  Respiratory: rhonchi  Psychiatry: A & O x 3, Mood & affect appropriate  Gastrointestinal:  Soft, Non-tender, + BS	  Skin: No rashes, No ecchymoses, No cyanosis	  Neurologic: Non-focal  Extremities: Normal range of motion, No clubbing, cyanosis + mild  edema  Vascular: Peripheral pulses palpable 2+ bilaterally    MEDICATIONS  (STANDING):  atorvastatin 40 milliGRAM(s) Oral at bedtime  bisacodyl 5 milliGRAM(s) Oral every 12 hours  buMETAnide Injectable 2 milliGRAM(s) IV Push daily  chlorhexidine 4% Liquid 1 Application(s) Topical <User Schedule>  heparin  Infusion.  Unit(s)/Hr (18 mL/Hr) IV Continuous <Continuous>  levothyroxine 137 MICROGram(s) Oral daily  metoprolol succinate  milliGRAM(s) Oral daily  midodrine. 10 milliGRAM(s) Oral <User Schedule>  nystatin Powder 1 Application(s) Topical two times a day  senna 2 Tablet(s) Oral at bedtime      TELEMETRY: 	    ECG:  	  RADIOLOGY:  OTHER: 	  	  LABS:	 	    CARDIAC MARKERS:                                8.0    12.01 )-----------( 151      ( 19 Apr 2023 06:54 )             24.7           proBNP:   Lipid Profile:   HgA1c:   TSH:   PTT - ( 19 Apr 2023 02:47 )  PTT:61.6 sec    1.  ARF--now HD dependent.  W/U hopefully being clarified by renal bx results.  Preliminary NOT indicative of major ntreatable dx   2.  Hypotension on HD--may need to lower UF goals.  HD orders reviewed with fellow, HD RN  discussed with med team    Assessment and plan  ---------------------------  77y m pmh BPH, Chronic venous insufficiency, HLD Hypothyroidism, S/P CABG x 2, S/P mitral valve replacement, SP Status post angioplasty, Sp TKR rt, Anasarca Pt comes to ed c/o shortness of breath for past month w progressive worsening of anasarca w swelling of scrotum and diff urinating, Has been noncompliant on diuretics for past two weeks w 20lb weight gain over past month. No fever and chills, sputum, nvdc, cp. PE Adult male lying stretcher w maked swelling to josh lower extr, w pitting edema,  chest  scant josh crackles w slight wheeze and prolonged exp phase  chf acute on chronic sec to RV dysfunction  tele, AFib hr controlled  will adjust cardiac meds  +BC i bottle, MSSA , s/p ppm explant  s/p PPM and LEAD extraction  oob too chair  PICC line needs 6 weeks of abx  acute on chronic renal failure/ renal appreciated  acute renal failure/ hematuria/ac was held awaiting renal eval/urology called awaiting ct abdomen and pelvis has ordered since last night awaiting results  beta blocker sed to rate control and hx of cad, s/p CABG  no further hematuria, start iv heparin low dose observe closely so far so good  on HD ?need of bumex  vanco level noted  cardiomyopathy can not tolerate ACE/ARB  a.fib hr is well controlled  hematuria, ac is held urology called, fu cbc closely discussed with ACP mat re start if hematuria has resolved  no further hematuria started on ac keep ptt 50 to 70  awaiting pathology  hypotension bumex decreased to daily  will add midodrine specially if needs hd  may consider to re start on Eliquis, will hold for now  consider dc planning as out pt HD  may dc bumex if ok by renal    	         deconditioning

## 2023-04-20 NOTE — PROGRESS NOTE ADULT - ASSESSMENT
77  year old male    h/o  CAD s/p stent , asa.      A-fib/ PPM, , hypothyroid, and total left knee replacement    prior  PPM  interrogation  with  WCT/   s/p  brief   bursts  of  afib/ , on prior visit   Ct chest, retrosternal hematoma.  mod left pl effusion    was  on bumex/  aldactone        *    admitted   for  worsening   pedal  edema          component  of  cellulitis   and  from  c/c  diastolic  chf,/  h/o  l/edema  of L  leg         pt  admits to  being  non complaint with  meds        *   h/o    c/c AFIB,   has  PPM         on eliquis     *    Anemia, , hb stable, had  been seen by  gi  eval dr joann sanchez in past     *    CAD,     cath, with 2 vessel disease,  s/p  CABG and  MVR  surg d r marni   *      h/o  Afib on    eliquis          c/c leg redness/  4 +  edema, on keflex, has  improved    *     echo,  on 7/2022,  ef  35/ new/ severe  TR          cath,   was non  obstructive    *    on synthroid          on asa/  torpol/  eliquis          crt  noted,   from  lasix// aldactone.  leg  swelling  is  lessening   *   MSSA  bacterinia           was  on iv ancef.  ,  rpt bcx    are  negative          explant pf  ppm  and  Micra  placement on  3/30/23. dr sunitha rocha   *    Echo,  ef  25,  mod  AI.  severe  TR        PICC  line  and  extended  course  of  ab.        BEULAH,  /   ?  AIN,  hence   ab was  switched   to iv vanco,  per  ID         Afib. on iv heparin         crt   was  9,9, now  on HD            s/p  renal bx on 4/ 14.          pt is  euvolemic/  still awiating path report         card /  dr mikayla dick         rad< from: TTE with Doppler (w/Cont) (07.05.22 @ 09:42) >  Conclusions:  Endocardial visualization enhanced with intravenous  injection of Ultrasonic Enhancing Agent (Definity).  Severe left ventricular enlargement.  Estimated ejection fraction 35%. Diffuse hypokinesis, with  inferior akinesis.  Bioprosthetic mitral valve with normal function.  Right ventricular enlargement with decreased right  ventricular systolic function.  A device wire is noted in the right heart.  ------------------------------------------------------------------------  Confirmed on  7/5/2022 - 12:16:10 by Kristian    < end of copied text >

## 2023-04-20 NOTE — CHART NOTE - NSCHARTNOTEFT_GEN_A_CORE
Nutrition Follow Up Note  Patient seen for: nutrition follow up    Chart reviewed, events noted. Pt is a 76 y/o M with PMH: "Afib, CAD s/p CABG, BPH, HLD, HTN, and hypothyroidism who presented to the hospital on 3/25 with complaints of shortness of breath and wiley gain. The patient had been nonadherent with his diuretic regimen as outpatient and presented with acute on chronic decompensated heart failure. The patient was initiated on IV diuresis with significant improvement in volume status with approximately 15lb weight loss. Hospital course was further complicated by MSSA bacteremia requiring pacemaker extraction with micra implant and currently being managed with IVC abx with PICC line in place. The nephrology team was consulted for oliguric BEULAH. On review of [facility] pt noted to have a SCr of 1 on admission which since  has progressively increased to 9.66 initiated on HD 4/10. Last HD done on . Underwent kidney biopsy on , pending results." Remains on IV Bumex.    Source: [x] Patient       [x] EMR        [] RN        [] Family at bedside       [] Other:    -If unable to interview patient: [] Trach/Vent/BiPAP  [] Disoriented/confused/inappropriate to interview    Diet Order:   Diet, Regular:   No Concentrated Potassium  Low Sodium (23)    - Is current order appropriate/adequate? [] Yes  [x]  No: potassium WNL and now Phosphorus elevated (not on phos binder)    - PO intake :   [x] >75%  Adequate    [] 50-75%  Fair       [] <50%  Poor    - Nutrition-related concerns:      -Pt upset with long hospitalization; encouraged Pt call diet office to place orders (menu located on bedside table)      -generally good appetite of % of meals; decreased x 3 days 2/2 constipation (now resolved)      -denies receiving previous diet education despite education documented by RD on  - answered all questions regarding foods he can/can't eat while on HD      -wt fluctuations noted - anticipated 2/2 fluid shifts from HD and diuresis, Pt still edematous    GI: Pt denies any N/V. Pt c/o constipation x 3 days - received enema  and now reported 3 episodes of LBMs today.   Bowel Regimen? [x] Yes - senna, bisacodyl  [] No    Weights: current dosing wt 99.6 kg ()  Daily Weight in k.7 (-), Weight in k.9 (), Weight in k (-), Weight in k.5 (), 104.2 kg (), 105.2 kg (), lowest recorded wt 98.5 kg (), admit wt 104.1 kg (3/27)    Nutritionally Pertinent MEDICATIONS  (STANDING):  atorvastatin  bisacodyl  levothyroxine  metoprolol succinate ER  midodrine.  senna    Pertinent Labs:  @ 07:20: Na 135, BUN 37<H>, Cr 4.09<H>, <H>, K+ 4.1, Phos , Mg --, Alk Phos --, ALT/SGPT --, AST/SGOT --, HbA1c --  Phos 5.7 H ()    Skin per nursing documentation: no pressure injuries documented  Edema: generalized 1+ edema, R leg 3+ edema, L leg and knee 2+ edema per RN flowsheets     Estimated Needs: based on IBW 75.2 kg  Estimated Energy Needs: 6291-3611 kcal/day (30-35 kcal/kg)  Estimated Protein Needs: 90..8 g Pro/day (1.2-1.5 g Pro/kg)  Estimated Fluid Needs: defer to MD team  [] no change since previous assessment  [x] recalculated: increased needs 2/2 new HD    Previous Nutrition Diagnosis: 1) limited adherence to nutrition-related recommendations and 2) food and nutrition knowledge deficit - addressed with ongoing education  Nutrition Diagnosis is: [x] ongoing  [] resolved [] not applicable   New Nutrition Diagnosis: [x] Not applicable    Nutrition Care Plan:  [x] In Progress  [] Achieved  [] Not applicable    Nutrition Interventions:     Education Provided:       [x] Yes: phosphorus and potassium content of foods (handouts also provided) [] No:        Recommendations:         [] Continue current diet order            [] Add oral nutrition supplement:     [x] Discontinue current diet order. Recommend: Regular, Low Sodium, No Concentrated Phosphorus diet + Nepro 1x/day (425 kcal, 19 g Pro/8oz)     [x] Add micronutrient supplementation: nephrovite daily      [] Continue current micronutrient supplementation:      [x] Other: adjust bowel regimen PRN; reinforce diet education at f/u    Monitoring and Evaluation:   Continue to monitor nutritional intake, tolerance to diet prescription, weights, labs, skin integrity    RD remains available upon request and will follow up per protocol  Desiree Layton MPH, RD, CDN - TEAMS/Pager# [phone]

## 2023-04-21 LAB
ANION GAP SERPL CALC-SCNC: 10 MMOL/L — SIGNIFICANT CHANGE UP (ref 5–17)
APTT BLD: 65.6 SEC — HIGH (ref 27.5–35.5)
BUN SERPL-MCNC: 49 MG/DL — HIGH (ref 7–23)
CALCIUM SERPL-MCNC: 7.8 MG/DL — LOW (ref 8.4–10.5)
CHLORIDE SERPL-SCNC: 97 MMOL/L — SIGNIFICANT CHANGE UP (ref 96–108)
CO2 SERPL-SCNC: 27 MMOL/L — SIGNIFICANT CHANGE UP (ref 22–31)
CREAT SERPL-MCNC: 5.02 MG/DL — HIGH (ref 0.5–1.3)
EGFR: 11 ML/MIN/1.73M2 — LOW
GLUCOSE SERPL-MCNC: 121 MG/DL — HIGH (ref 70–99)
HBV SURFACE AG SER-ACNC: SIGNIFICANT CHANGE UP
HCT VFR BLD CALC: 24 % — LOW (ref 39–50)
HGB BLD-MCNC: 7.6 G/DL — LOW (ref 13–17)
MCHC RBC-ENTMCNC: 28.6 PG — SIGNIFICANT CHANGE UP (ref 27–34)
MCHC RBC-ENTMCNC: 31.7 GM/DL — LOW (ref 32–36)
MCV RBC AUTO: 90.2 FL — SIGNIFICANT CHANGE UP (ref 80–100)
NRBC # BLD: 0 /100 WBCS — SIGNIFICANT CHANGE UP (ref 0–0)
PLATELET # BLD AUTO: 159 K/UL — SIGNIFICANT CHANGE UP (ref 150–400)
POTASSIUM SERPL-MCNC: 4.2 MMOL/L — SIGNIFICANT CHANGE UP (ref 3.5–5.3)
POTASSIUM SERPL-SCNC: 4.2 MMOL/L — SIGNIFICANT CHANGE UP (ref 3.5–5.3)
RBC # BLD: 2.66 M/UL — LOW (ref 4.2–5.8)
RBC # FLD: 14.6 % — HIGH (ref 10.3–14.5)
SODIUM SERPL-SCNC: 134 MMOL/L — LOW (ref 135–145)
VANCOMYCIN FLD-MCNC: 18.5 UG/ML — SIGNIFICANT CHANGE UP
WBC # BLD: 10.62 K/UL — HIGH (ref 3.8–10.5)
WBC # FLD AUTO: 10.62 K/UL — HIGH (ref 3.8–10.5)

## 2023-04-21 PROCEDURE — 99233 SBSQ HOSP IP/OBS HIGH 50: CPT | Mod: GC

## 2023-04-21 RX ORDER — MIDODRINE HYDROCHLORIDE 2.5 MG/1
10 TABLET ORAL ONCE
Refills: 0 | Status: COMPLETED | OUTPATIENT
Start: 2023-04-21 | End: 2023-04-21

## 2023-04-21 RX ORDER — VANCOMYCIN HCL 1 G
1000 VIAL (EA) INTRAVENOUS ONCE
Refills: 0 | Status: COMPLETED | OUTPATIENT
Start: 2023-04-21 | End: 2023-04-21

## 2023-04-21 RX ORDER — ACETAMINOPHEN 500 MG
1000 TABLET ORAL ONCE
Refills: 0 | Status: COMPLETED | OUTPATIENT
Start: 2023-04-21 | End: 2023-04-21

## 2023-04-21 RX ADMIN — HEPARIN SODIUM 2200 UNIT(S)/HR: 5000 INJECTION INTRAVENOUS; SUBCUTANEOUS at 06:26

## 2023-04-21 RX ADMIN — Medication 400 MILLIGRAM(S): at 23:59

## 2023-04-21 RX ADMIN — NYSTATIN CREAM 1 APPLICATION(S): 100000 CREAM TOPICAL at 17:55

## 2023-04-21 RX ADMIN — HEPARIN SODIUM 2200 UNIT(S)/HR: 5000 INJECTION INTRAVENOUS; SUBCUTANEOUS at 19:21

## 2023-04-21 RX ADMIN — NYSTATIN CREAM 1 APPLICATION(S): 100000 CREAM TOPICAL at 06:26

## 2023-04-21 RX ADMIN — Medication 137 MICROGRAM(S): at 06:26

## 2023-04-21 RX ADMIN — HEPARIN SODIUM 2200 UNIT(S)/HR: 5000 INJECTION INTRAVENOUS; SUBCUTANEOUS at 07:36

## 2023-04-21 RX ADMIN — MIDODRINE HYDROCHLORIDE 10 MILLIGRAM(S): 2.5 TABLET ORAL at 12:54

## 2023-04-21 RX ADMIN — Medication 100 MILLIGRAM(S): at 09:43

## 2023-04-21 RX ADMIN — Medication 250 MILLIGRAM(S): at 23:38

## 2023-04-21 RX ADMIN — MIDODRINE HYDROCHLORIDE 10 MILLIGRAM(S): 2.5 TABLET ORAL at 08:53

## 2023-04-21 RX ADMIN — CHLORHEXIDINE GLUCONATE 1 APPLICATION(S): 213 SOLUTION TOPICAL at 06:27

## 2023-04-21 RX ADMIN — HEPARIN SODIUM 2200 UNIT(S)/HR: 5000 INJECTION INTRAVENOUS; SUBCUTANEOUS at 17:50

## 2023-04-21 RX ADMIN — HEPARIN SODIUM 2200 UNIT(S)/HR: 5000 INJECTION INTRAVENOUS; SUBCUTANEOUS at 07:33

## 2023-04-21 RX ADMIN — ATORVASTATIN CALCIUM 40 MILLIGRAM(S): 80 TABLET, FILM COATED ORAL at 22:05

## 2023-04-21 NOTE — CONSULT NOTE ADULT - ASSESSMENT
Interventional Radiology    Evaluate for Procedure: Tunneled HD catheter placement     HPI: 77y Male with ESRD s/p non tunneled HD catheter on 4/10/2023. IR consulted for tunneled HD catheter placement.       Allergies: Myrbetriq (Hives)  tamsulosin (Hives)  levofloxacin (Hives)  alfuzosin (Hives)    Medications (Abx/Cardiac/Anticoagulation/Blood Products)    buMETAnide Injectable: 2 milliGRAM(s) IV Push (04-20 @ 05:46)  heparin  Infusion.: 2200 Unit(s)/Hr IV Continuous (04-21 @ 07:34)  metoprolol succinate ER: 100 milliGRAM(s) Oral (04-21 @ 09:43)  midodrine.: 10 milliGRAM(s) Oral (04-21 @ 08:53)  midodrine.: 10 milliGRAM(s) Oral (04-21 @ 12:54)  vancomycin  IVPB: 250 mL/Hr IV Intermittent (04-19 @ 17:36)    Data:    T(C): 36.2  HR: 99  BP: 111/66  RR: 18  SpO2: 95%    -WBC 10.62 / HgB 7.6 / Hct 24.0 / Plt 159  -Na 134 / Cl 97 / BUN 49 / Glucose 121  -K 4.2 / CO2 27 / Cr 5.02  -ALT -- / Alk Phos -- / T.Bili --  -INR -- / PTT 65.6          Radiology:     Assessment/Plan:77y Male with ESRD s/p non tunneled HD catheter on 4/10/2023. IR consulted for tunneled HD catheter placement.     - Pt being treated for MSSA bacteriemia, elevated WBC. Will need ID clearance if it remains elevated.  - Will plan for procedure on 4/25.  - Place order under Alexis.    - Pt  Doesn't need to be NPO.   - Needs STAT CBC, BMP, Coags, TYPE and Screen in AM.

## 2023-04-21 NOTE — PROGRESS NOTE ADULT - SUBJECTIVE AND OBJECTIVE BOX
Date of Service: 04-21-23 @ 07:55           CARDIOLOGY     PROGRESS  NOTE   ________________________________________________    CHIEF COMPLAINT:Patient is a 77y old  Male who presents with a chief complaint of Chart Reviewed, Events noted  " 77  year old male h/o  CAD s/p stent , asa. A-fib/ PPM, , hypothyroid, and total left knee replacement  prior  PPM  interrogation  with  WCT/   s/p  brief   bursts  of  afib/ , on prior visit. Ct chest, retrosternal hematoma.  mod left pl effusion was  on bumex/  aldactone admitted with  worsening   pedal  edema  component  of  cellulitis   and  from  c/c  diastolic  chf,/  h/o  l/edema  of L  leg pt  admits to  being  non complaint with  meds"   doing better    	  REVIEW OF SYSTEMS:  CONSTITUTIONAL: No fever, weight loss, or fatigue  EYES: No eye pain, visual disturbances, or discharge  ENT:  No difficulty hearing, tinnitus, vertigo; No sinus or throat pain  NECK: No pain or stiffness  RESPIRATORY: No cough, wheezing, chills or hemoptysis; no Shortness of Breath  CARDIOVASCULAR: No chest pain, palpitations, passing out, dizziness, or leg swelling  GASTROINTESTINAL: No abdominal or epigastric pain. No nausea, vomiting, or hematemesis; No diarrhea or constipation. No melena or hematochezia.  GENITOURINARY: No dysuria, frequency, hematuria, or incontinence  NEUROLOGICAL: No headaches, memory loss, loss of strength, numbness, or tremors  SKIN: No itching, burning, rashes, or lesions   LYMPH Nodes: No enlarged glands  ENDOCRINE: No heat or cold intolerance; No hair loss  MUSCULOSKELETAL: No joint pain or swelling; No muscle, back, or extremity pain  PSYCHIATRIC: No depression, anxiety, mood swings, or difficulty sleeping  HEME/LYMPH: No easy bruising, or bleeding gums  ALLERGY AND IMMUNOLOGIC: No hives or eczema	    [x ] All others negative	  [ ] Unable to obtain    PHYSICAL EXAM:  T(C): 36.7 (04-21-23 @ 04:28), Max: 37.1 (04-20-23 @ 21:21)  HR: 85 (04-21-23 @ 04:28) (85 - 91)  BP: 94/60 (04-21-23 @ 04:28) (94/60 - 96/63)  RR: 18 (04-21-23 @ 04:28) (18 - 18)  SpO2: 95% (04-21-23 @ 04:28) (94% - 96%)  Wt(kg): --  I&O's Summary    20 Apr 2023 07:01  -  21 Apr 2023 07:00  --------------------------------------------------------  IN: 720 mL / OUT: 70 mL / NET: 650 mL        Appearance: Normal	  HEENT:   Normal oral mucosa, PERRL, EOMI	  Lymphatic: No lymphadenopathy  Cardiovascular: Normal S1 S2, No JVD, +murmurs, No edema  Respiratory: rhonchi  Psychiatry: A & O x 3, Mood & affect appropriate  Gastrointestinal:  Soft, Non-tender, + BS	  Skin: No rashes, No ecchymoses, No cyanosis	  Extremities: Normal range of motion, No clubbing, cyanosis or edema  Vascular: Peripheral pulses palpable 2+ bilaterally    MEDICATIONS  (STANDING):  atorvastatin 40 milliGRAM(s) Oral at bedtime  bisacodyl 5 milliGRAM(s) Oral every 12 hours  chlorhexidine 4% Liquid 1 Application(s) Topical <User Schedule>  heparin  Infusion.  Unit(s)/Hr (18 mL/Hr) IV Continuous <Continuous>  levothyroxine 137 MICROGram(s) Oral daily  metoprolol succinate  milliGRAM(s) Oral daily  midodrine. 10 milliGRAM(s) Oral <User Schedule>  nystatin Powder 1 Application(s) Topical two times a day  senna 2 Tablet(s) Oral at bedtime      TELEMETRY: 	    ECG:  	  RADIOLOGY:  OTHER: 	  	  LABS:	 	    CARDIAC MARKERS:                      7.6    10.62 )-----------( 159      ( 21 Apr 2023 06:36 )             24.0     04-21    134<L>  |  97  |  49<H>  ----------------------------<  121<H>  4.2   |  27  |  5.02<H>    Ca    7.8<L>      21 Apr 2023 06:37      proBNP:   Lipid Profile:   HgA1c:   TSH:   PTT - ( 21 Apr 2023 06:36 )  PTT:65.6 sec    1.  ARF--now HD dependent.  W/U hopefully being clarified by renal bx results.  Preliminary NOT indicative of major ntreatable dx   2.  Hypotension on HD--may need to lower UF goals.  HD orders reviewed with fellow, HD RN  discussed with med team    -6 weeks iv abx - day 19 of 42 of abx  -potential side effects of abx explained including GI, Cdiff, allergy issues, development of resistance, etc.  -potential side effects of PICC explained including bleeding and infection  -weekly cbc, bun/creatinine - fax [phone]  -OR cx negative  -dose vanco by level for now - redose vanco 1 gm iv x 1 on 4/19 and check level on 4/20     Assessment and plan  ---------------------------  77y m pmh BPH, Chronic venous insufficiency, HLD Hypothyroidism, S/P CABG x 2, S/P mitral valve replacement, SP Status post angioplasty, Sp TKR rt, Anasarca Pt comes to ed c/o shortness of breath for past month w progressive worsening of anasarca w swelling of scrotum and diff urinating, Has been noncompliant on diuretics for past two weeks w 20lb weight gain over past month. No fever and chills, sputum, nvdc, cp. PE Adult male lying stretcher w maked swelling to josh lower extr, w pitting edema,  chest  scant josh crackles w slight wheeze and prolonged exp phase  chf acute on chronic sec to RV dysfunction  tele, AFib hr controlled  will adjust cardiac meds  +BC i bottle, MSSA , s/p ppm explant  s/p PPM and LEAD extraction  oob too chair  PICC line needs 6 weeks of abx  acute on chronic renal failure/ renal appreciated  acute renal failure/ hematuria/ac was held awaiting renal eval/urology called awaiting ct abdomen and pelvis has ordered since last night awaiting results  beta blocker sed to rate control and hx of cad, s/p CABG  no further hematuria, start iv heparin low dose observe closely so far so good  on HD ?need of bumex  vanco level noted  cardiomyopathy can not tolerate ACE/ARB  a.fib hr is well controlled  hematuria, ac is held urology called, fu cbc closely discussed with ACP mat re start if hematuria has resolved  no further hematuria started on ac keep ptt 50 to 70  awaiting pathology  hypotension bumex decreased to daily  will add midodrine specially if needs hd  may consider to re start on Eliquis, will hold for now  consider dc planning as out pt HD, dc Bumex  dc planning as pt is scheduled for out pt HD

## 2023-04-21 NOTE — PROGRESS NOTE ADULT - ATTENDING COMMENTS
77 year old male with PMH of Afib, CAD s/p CABG, BPH, HLD, HTN, and hypothyroidism who presented to the hospital on 3/25 with complaints of shortness of breath and wiley gain. The patient had been nonadherent with his diuretic regimen as outpatient and presented with acute on chronic decompensated heart failure. The patient was initiated on IV diuresis with significant improvement in volume status with approximately 15lb weight loss. Hospital course was further complicated by MSSA bacteremia requiring pacemaker extraction with micra implant and currently being managed with IVC abx with PICC line in place. The nephrology team was consulted for oliguric BEULAH. On review of [facility] pt noted to have a SCr of 1 on admission which since 4/1 has progressively increased to 9.66 initiated on HD 4/10. Last HD done on 4/14. Underwent kidney biopsy on 4/14. On HD today, no new complaints  1.  ARF--now HD dependent.  Biopsy reviewed in detail with primarily interstitial disease c/w PYELONEPHRITIS emphasizing need for prolonged antibiotic rx.  heck PTH, VitD  2.  Hypotension on HD--may need to lower UF goals.  HD orders reviewed with fellow, HD RN  3.  Pyelonephritis--by biopsy.  Review findings with ID to assess optimal rx  discussed with med team  Bg Valerio MD  contact me on TEAMS

## 2023-04-21 NOTE — PROGRESS NOTE ADULT - PROBLEM SELECTOR PLAN 1
Pt. with oliguric BEULAH. Differential also includes AIN perhaps in setting of Cefazolin? vs overdiuresis. On review of [facility] pt noted to have a Scr of 1 on admission which since 4/1 has progressively increased to 7 most recently. UA with blood and also proteinuria of 1.0g.       Serological work up negative except JAGRUTI positive with homogenous pattern. Renal sonogram demonstrating a horseshoe kidney without evidence of hydronephrosis or renal calculi. Pt underwent kidney biopsy by IR on 4/14. Preliminary results ruled out active treatable disease. Will follow up final results.      Pt underwent first session HD on 4/10 via [facility] non tunneled HD catheter. Last HD done on 4/19. Plan for HD again today. Pt with no signs of renal recovery , pt will need long term dialysis for now. IR consult for tunneled HD catheter placement.   Send iron studies. Monitor labs and urine output. Avoid nephrotoxins. Dose medications as per eGFR.

## 2023-04-21 NOTE — PROGRESS NOTE ADULT - SUBJECTIVE AND OBJECTIVE BOX
date of service: 04-21-23 @ 09:41  afebrile  REVIEW OF SYSTEMS:  CONSTITUTIONAL: No fever,  no  weight loss  ENT:  No  tinnitus,   no   vertigo  NECK: No pain or stiffness  RESPIRATORY: No cough, wheezing, chills or hemoptysis;    No Shortness of Breath  CARDIOVASCULAR: No chest pain, palpitations, dizziness  GASTROINTESTINAL: No abdominal or epigastric pain. No nausea, vomiting, or hematemesis; No diarrhea  No melena or hematochezia.  GENITOURINARY: No dysuria, frequency, hematuria, or incontinence  NEUROLOGICAL: No headaches  SKIN: No itching,  no   rash  LYMPH Nodes: No enlarged glands  ENDOCRINE: No heat or cold intolerance  MUSCULOSKELETAL: No joint pain or swelling  PSYCHIATRIC: No depression, anxiety  HEME/LYMPH: No easy bruising, or bleeding gums  ALLERGY AND IMMUNOLOGIC: No hives or eczema	    MEDICATIONS  (STANDING):  atorvastatin 40 milliGRAM(s) Oral at bedtime  bisacodyl 5 milliGRAM(s) Oral every 12 hours  chlorhexidine 4% Liquid 1 Application(s) Topical <User Schedule>  heparin  Infusion.  Unit(s)/Hr (18 mL/Hr) IV Continuous <Continuous>  levothyroxine 137 MICROGram(s) Oral daily  metoprolol succinate  milliGRAM(s) Oral daily  midodrine. 10 milliGRAM(s) Oral <User Schedule>  nystatin Powder 1 Application(s) Topical two times a day  senna 2 Tablet(s) Oral at bedtime    MEDICATIONS  (PRN):  acetaminophen     Tablet .. 650 milliGRAM(s) Oral every 6 hours PRN Mild Pain (1 - 3)  heparin   Injectable 8000 Unit(s) IV Push every 6 hours PRN For aPTT less than 40  heparin   Injectable 4000 Unit(s) IV Push every 6 hours PRN For aPTT between 40 - 57  sodium chloride 0.9% lock flush 10 milliLiter(s) IV Push every 1 hour PRN Pre/post blood products, medications, blood draw, and to maintain line patency      Vital Signs Last 24 Hrs  T(C): 36.4 (21 Apr 2023 08:26), Max: 37.1 (20 Apr 2023 21:21)  T(F): 97.5 (21 Apr 2023 08:26), Max: 98.7 (20 Apr 2023 21:21)  HR: 82 (21 Apr 2023 08:56) (82 - 95)  BP: 105/61 (21 Apr 2023 08:56) (94/60 - 105/61)  BP(mean): 75 (21 Apr 2023 08:56) (75 - 75)  RR: 18 (21 Apr 2023 08:56) (18 - 18)  SpO2: 96% (21 Apr 2023 08:56) (94% - 96%)    Parameters below as of 21 Apr 2023 08:56  Patient On (Oxygen Delivery Method): nasal cannula  O2 Flow (L/min): 2    CAPILLARY BLOOD GLUCOSE        I&O's Summary    20 Apr 2023 07:01  -  21 Apr 2023 07:00  --------------------------------------------------------  IN: 720 mL / OUT: 70 mL / NET: 650 mL          Appearance: Normal	  HEENT:   Normal oral mucosa, PERRL, EOMI	  Lymphatic: No lymphadenopathy  Cardiovascular: Normal S1 S2, No JVD  Respiratory: Lungs clear to auscultation	  Gastrointestinal:  Soft, Non-tender, + BS	  Skin: No rash, No ecchymoses	  Extremities:     LABS:                        7.6    10.62 )-----------( 159      ( 21 Apr 2023 06:36 )             24.0     04-21    134<L>  |  97  |  49<H>  ----------------------------<  121<H>  4.2   |  27  |  5.02<H>    Ca    7.8<L>      21 Apr 2023 06:37      PTT - ( 21 Apr 2023 06:36 )  PTT:65.6 sec                        Consultant(s) Notes Reviewed:      Care Discussed with Consultants/Other Providers:

## 2023-04-21 NOTE — PROGRESS NOTE ADULT - ASSESSMENT
77  year old male    h/o  CAD s/p stent , asa.      A-fib/ PPM, , hypothyroid, and total left knee replacement    prior  PPM  interrogation  with  [facility]/   s/p  brief   bursts  of  afib/ , on prior visit   Ct chest, retrosternal hematoma.  mod left pl effusion    was  on bumex/  aldactone        *    admitted   for  worsening   pedal  edema          component  of  cellulitis   and  from  c/c  diastolic  chf,/  h/o  l/edema  of L  leg         pt  admits to  being  non complaint with  meds        *   h/o    c/c AFIB,   has  PPM         on eliquis     *    Anemia, , hb stable, had  been seen by  gi  eval dr joann sanchez in past     *    CAD,     cath, with 2 vessel disease,  s/p  CABG and  MVR  surg d r marni   *      h/o  Afib on    eliquis          c/c leg redness/  4 +  edema, on keflex, has  improved    *     echo,  on 7/2022,  ef  35/ new/ severe  TR          cath,   was non  obstructive    *    on synthroid          on asa/  torpol/  eliquis          crt  noted,   from  lasix// aldactone.  leg  swelling  is  lessening   *   MSSA  bacterinia           was  on iv ancef.  ,  rpt bcx    are  negative          explant pf  ppm  and  Micra  placement on  3/30/23. dr sunitha rocha   *    Echo,  ef  25,  mod  AI.  severe  TR        PICC  line  and  extended  course  of  ab.        BEULAH,  /   ?  AIN,  hence   ab was  switched   to iv vanco,  per  ID         Afib. on iv heparin         crt   was  9,9, now  on HD            s/p  renal bx on 4/ 14.         still awiating path report/  further  plans  depend  on path         card /  dr mikayla dick         rad< from: TTE with Doppler (w/Cont) (07.05.22 @ 09:42) >  Conclusions:  Endocardial visualization enhanced with intravenous  injection of Ultrasonic Enhancing Agent (Definity).  Severe left ventricular enlargement.  Estimated ejection fraction 35%. Diffuse hypokinesis, with  inferior akinesis.  Bioprosthetic mitral valve with normal function.  Right ventricular enlargement with decreased right  ventricular systolic function.  A device wire is noted in the right heart.  ------------------------------------------------------------------------  Confirmed on  7/5/2022 - 12:16:10 by Kristian    < end of copied text >

## 2023-04-21 NOTE — PROGRESS NOTE ADULT - SUBJECTIVE AND OBJECTIVE BOX
[facility] DIVISION OF KIDNEY DISEASES AND HYPERTENSION -- FOLLOW UP NOTE  --------------------------------------------------------------------------------  HPI: 77 year old male with PMH of Afib, CAD s/p CABG, BPH, HLD, HTN, and hypothyroidism who presented to the hospital on 3/25 with complaints of shortness of breath and wiley gain. The patient had been nonadherent with his diuretic regimen as outpatient and presented with acute on chronic decompensated heart failure. The patient was initiated on IV diuresis with significant improvement in volume status with approximately 15lb weight loss. Hospital course was further complicated by MSSA bacteremia requiring pacemaker extraction with micra implant and currently being managed with IVC abx with PICC line in place. The nephrology team was consulted for oliguric BEULAH. On review of [facility] pt noted to have a SCr of 1 on admission which since 4/1 has progressively increased to 9.66 initiated on HD 4/10. Last HD done on 4/19. Underwent kidney biopsy on 4/14. Awaiting final biopsy results.      24 hour events/subjective:  Pt. seen and examined this morning. He denies any headaches, fevers/chills, chest pain, and abdominal pain. No fever.     PAST HISTORY  --------------------------------------------------------------------------------  No significant changes to PMH, PSH, FHx, SHx, unless otherwise noted    ALLERGIES & MEDICATIONS  --------------------------------------------------------------------------------  Allergies    Myrbetriq (Hives)  tamsulosin (Hives)  levofloxacin (Hives)  alfuzosin (Hives)    Intolerances    Standing Inpatient Medications  atorvastatin 40 milliGRAM(s) Oral at bedtime  bisacodyl 5 milliGRAM(s) Oral every 12 hours  chlorhexidine 4% Liquid 1 Application(s) Topical <User Schedule>  heparin  Infusion.  Unit(s)/Hr IV Continuous <Continuous>  levothyroxine 137 MICROGram(s) Oral daily  metoprolol succinate  milliGRAM(s) Oral daily  midodrine. 10 milliGRAM(s) Oral <User Schedule>  nystatin Powder 1 Application(s) Topical two times a day  senna 2 Tablet(s) Oral at bedtime    PRN Inpatient Medications  acetaminophen     Tablet .. 650 milliGRAM(s) Oral every 6 hours PRN  heparin   Injectable 8000 Unit(s) IV Push every 6 hours PRN  heparin   Injectable 4000 Unit(s) IV Push every 6 hours PRN  sodium chloride 0.9% lock flush 10 milliLiter(s) IV Push every 1 hour PRN    REVIEW OF SYSTEMS  --------------------------------------------------------------------------------  Gen: No fevers   Respiratory: No dyspnea  CV: No chest pain  GI: No abdominal pain  : No dysuria  MSK: No  edema  Neuro: no dizziness   All other systems were reviewed and are negative, except as noted.    VITALS/PHYSICAL EXAM  --------------------------------------------------------------------------------  T(C): 36.7 (04-21-23 @ 04:28), Max: 37.1 (04-20-23 @ 21:21)  HR: 85 (04-21-23 @ 04:28) (85 - 91)  BP: 94/60 (04-21-23 @ 04:28) (94/60 - 96/63)  RR: 18 (04-21-23 @ 04:28) (18 - 18)  SpO2: 95% (04-21-23 @ 04:28) (94% - 96%)  Wt(kg): --    04-20-23 @ 07:01  -  04-21-23 @ 07:00  --------------------------------------------------------  IN: 720 mL / OUT: 70 mL / NET: 650 mL    Physical Exam:              Gen: NAD  	HEENT: MMM  	Pulm: CTA B/L  	CV: S1S2  	Abd: Soft, +BS   	Ext: +LE edema B/L  	Neuro: Awake  	Skin: Warm and dry  	Vascular access: RIJ non tunneled HD catheter    LABS/STUDIES  --------------------------------------------------------------------------------              7.6    10.62 >-----------<  159      [04-21-23 @ 06:36]              24.0     134  |  97  |  49  ----------------------------<  121      [04-21-23 @ 06:37]  4.2   |  27  |  5.02        Ca     7.8     [04-21-23 @ 06:37]    PTT: 65.6       [04-21-23 @ 06:36]    Creatinine Trend:  SCr 5.02 [04-21 @ 06:37]  SCr 4.09 [04-20 @ 07:20]  SCr 5.98 [04-17 @ 07:10]  SCr 5.01 [04-16 @ 07:04]  SCr 4.14 [04-15 @ 06:46]    Urinalysis - [04-13-23 @ 07:54]      Color DARK BROWN / Appearance Turbid / SG 1.026 / pH 6.5      Gluc 100 mg/dL / Ketone Trace  / Bili Negative / Urobili Negative       Blood Large / Protein 300 mg/dL / Leuk Est Large / Nitrite Negative      RBC >50 / WBC 3-5 / Hyaline 0-2 / Gran  / Sq Epi  / Non Sq Epi  / Bacteria Negative    HBsAg Nonreact      [04-06-23 @ 11:29]  HCV 0.15, Nonreact      [04-06-23 @ 11:46]  HIV Nonreact      [04-06-23 @ 11:32]    JAGRUTI: titer 1:160, pattern Homogeneous      [04-06-23 @ 11:29]  C3 Complement 126      [04-06-23 @ 11:47]  C4 Complement 25      [04-06-23 @ 11:47]  ANCA: cANCA Negative, pANCA Negative, atypical ANCA Negative      [04-06-23 @ 11:29]  anti-GBM <0.2      [04-06-23 @ 11:46]  PLA2R: BIANCA 4.2, IFA --      [04-06-23 @ 11:46]  Immunofixation Serum:   Weak IgA Lambda Band Identified    Reference Range: None Detected      [04-06-23 @ 11:47]  SPEP Interpretation: Weak Beta-Migrating Paraprotein Identified      [04-06-23 @ 11:47]

## 2023-04-22 LAB
APTT BLD: 63.1 SEC — HIGH (ref 27.5–35.5)
BLD GP AB SCN SERPL QL: NEGATIVE — SIGNIFICANT CHANGE UP
HBV CORE AB SER-ACNC: SIGNIFICANT CHANGE UP
HBV SURFACE AB SER-ACNC: <3 MIU/ML — LOW
HBV SURFACE AB SER-ACNC: SIGNIFICANT CHANGE UP
HCT VFR BLD CALC: 24.4 % — LOW (ref 39–50)
HCV AB S/CO SERPL IA: 0.16 S/CO — SIGNIFICANT CHANGE UP (ref 0–0.99)
HCV AB SERPL-IMP: SIGNIFICANT CHANGE UP
HGB BLD-MCNC: 7.7 G/DL — LOW (ref 13–17)
MCHC RBC-ENTMCNC: 28.9 PG — SIGNIFICANT CHANGE UP (ref 27–34)
MCHC RBC-ENTMCNC: 31.6 GM/DL — LOW (ref 32–36)
MCV RBC AUTO: 91.7 FL — SIGNIFICANT CHANGE UP (ref 80–100)
NRBC # BLD: 0 /100 WBCS — SIGNIFICANT CHANGE UP (ref 0–0)
PLATELET # BLD AUTO: 164 K/UL — SIGNIFICANT CHANGE UP (ref 150–400)
RBC # BLD: 2.66 M/UL — LOW (ref 4.2–5.8)
RBC # FLD: 14.6 % — HIGH (ref 10.3–14.5)
RH IG SCN BLD-IMP: NEGATIVE — SIGNIFICANT CHANGE UP
VANCOMYCIN FLD-MCNC: 22.2 UG/ML — SIGNIFICANT CHANGE UP
WBC # BLD: 11.06 K/UL — HIGH (ref 3.8–10.5)
WBC # FLD AUTO: 11.06 K/UL — HIGH (ref 3.8–10.5)

## 2023-04-22 RX ORDER — OXYCODONE HYDROCHLORIDE 5 MG/1
5 TABLET ORAL ONCE
Refills: 0 | Status: DISCONTINUED | OUTPATIENT
Start: 2023-04-22 | End: 2023-04-22

## 2023-04-22 RX ORDER — OXYCODONE HYDROCHLORIDE 5 MG/1
5 TABLET ORAL EVERY 8 HOURS
Refills: 0 | Status: DISCONTINUED | OUTPATIENT
Start: 2023-04-22 | End: 2023-04-27

## 2023-04-22 RX ORDER — ACETAMINOPHEN 500 MG
1000 TABLET ORAL ONCE
Refills: 0 | Status: COMPLETED | OUTPATIENT
Start: 2023-04-22 | End: 2023-04-22

## 2023-04-22 RX ADMIN — Medication 1000 MILLIGRAM(S): at 00:29

## 2023-04-22 RX ADMIN — ATORVASTATIN CALCIUM 40 MILLIGRAM(S): 80 TABLET, FILM COATED ORAL at 21:04

## 2023-04-22 RX ADMIN — OXYCODONE HYDROCHLORIDE 5 MILLIGRAM(S): 5 TABLET ORAL at 15:10

## 2023-04-22 RX ADMIN — Medication 137 MICROGRAM(S): at 05:48

## 2023-04-22 RX ADMIN — NYSTATIN CREAM 1 APPLICATION(S): 100000 CREAM TOPICAL at 17:23

## 2023-04-22 RX ADMIN — HEPARIN SODIUM 2200 UNIT(S)/HR: 5000 INJECTION INTRAVENOUS; SUBCUTANEOUS at 17:27

## 2023-04-22 RX ADMIN — Medication 100 MILLIGRAM(S): at 05:48

## 2023-04-22 RX ADMIN — OXYCODONE HYDROCHLORIDE 5 MILLIGRAM(S): 5 TABLET ORAL at 21:40

## 2023-04-22 RX ADMIN — Medication 1000 MILLIGRAM(S): at 11:00

## 2023-04-22 RX ADMIN — OXYCODONE HYDROCHLORIDE 5 MILLIGRAM(S): 5 TABLET ORAL at 21:03

## 2023-04-22 RX ADMIN — HEPARIN SODIUM 2200 UNIT(S)/HR: 5000 INJECTION INTRAVENOUS; SUBCUTANEOUS at 08:07

## 2023-04-22 RX ADMIN — NYSTATIN CREAM 1 APPLICATION(S): 100000 CREAM TOPICAL at 05:47

## 2023-04-22 RX ADMIN — Medication 400 MILLIGRAM(S): at 10:38

## 2023-04-22 RX ADMIN — HEPARIN SODIUM 2200 UNIT(S)/HR: 5000 INJECTION INTRAVENOUS; SUBCUTANEOUS at 20:40

## 2023-04-22 RX ADMIN — CHLORHEXIDINE GLUCONATE 1 APPLICATION(S): 213 SOLUTION TOPICAL at 05:48

## 2023-04-22 RX ADMIN — OXYCODONE HYDROCHLORIDE 5 MILLIGRAM(S): 5 TABLET ORAL at 14:34

## 2023-04-22 NOTE — PROGRESS NOTE ADULT - ASSESSMENT
77  year old male    h/o  CAD s/p stent , asa.      A-fib/ PPM, , hypothyroid, and total left knee replacement    prior  PPM  interrogation  with  [facility]/   s/p  brief   bursts  of  afib/ , on prior visit   Ct chest, retrosternal hematoma.  mod left pl effusion    was  on bumex/  aldactone        *    admitted   for  worsening   pedal  edema          component  of  cellulitis   and  from  c/c  diastolic  chf,/  h/o  l/edema  of L  leg         pt  admits to  being  non complaint with  meds        *   h/o    c/c AFIB,   has  PPM         on eliquis     *    Anemia, , hb stable, had  been seen by  gi  eval dr joann sanchez in past     *    CAD,     cath, with 2 vessel disease,  s/p  CABG and  MVR  surg d r marni   *      h/o  Afib on    eliquis          c/c leg redness/  4 +  edema, on keflex, has  improved    *     echo,  on 7/2022,  ef  35/ new/ severe  TR          cath,   was non  obstructive    *    on synthroid          on asa/  torpol/  eliquis          crt  noted,   from  lasix// aldactone.  leg  swelling  is  lessening   *   MSSA  bacterinia           was  on iv ancef.  ,  rpt bcx    are  negative          explant pf  ppm  and  Micra  placement on  3/30/23. dr sunitha rocha   *    Echo,  ef  25,  mod  AI.  severe  TR        PICC  line  and  extended  course  of  ab.        BEULAH,  /   ?  AIN,  hence   ab was  switched   to iv vanco,  per  ID         Afib. on iv heparin         crt   was  9,9, now  on HD            s/p  renal bx on 4/ 14.         still awiating path report/  further  plans  depend  on path/  strat   d/c planning  for  ward         card /  dr mikayla dick         rad< from: TTE with Doppler (w/Cont) (07.05.22 @ 09:42) >  Conclusions:  Endocardial visualization enhanced with intravenous  injection of Ultrasonic Enhancing Agent (Definity).  Severe left ventricular enlargement.  Estimated ejection fraction 35%. Diffuse hypokinesis, with  inferior akinesis.  Bioprosthetic mitral valve with normal function.  Right ventricular enlargement with decreased right  ventricular systolic function.  A device wire is noted in the right heart.  ------------------------------------------------------------------------  Confirmed on  7/5/2022 - 12:16:10 by Kristian    < end of copied text >

## 2023-04-22 NOTE — PROGRESS NOTE ADULT - SUBJECTIVE AND OBJECTIVE BOX
Date of Service: 04-22-23 @ 09:07           CARDIOLOGY     PROGRESS  NOTE   ________________________________________________    CHIEF COMPLAINT:Patient is a 77y old  Male who presents with a chief complaint of Chart Reviewed, Events noted  " 77  year old male h/o  CAD s/p stent , asa. A-fib/ PPM, , hypothyroid, and total left knee replacement  prior  PPM  interrogation  with  [facility]/   s/p  brief   bursts  of  afib/ , on prior visit. Ct chest, retrosternal hematoma.  mod left pl effusion was  on bumex/  aldactone admitted with  worsening   pedal  edema  component  of  cellulitis   and  from  c/c  diastolic  chf,/  h/o  l/edema  of L  leg pt  admits to  being  non complaint with  meds"   no complain    	  REVIEW OF SYSTEMS:  CONSTITUTIONAL: No fever, weight loss, or fatigue  EYES: No eye pain, visual disturbances, or discharge  ENT:  No difficulty hearing, tinnitus, vertigo; No sinus or throat pain  NECK: No pain or stiffness  RESPIRATORY: No cough, wheezing, chills or hemoptysis; decrease  Shortness of Breath  CARDIOVASCULAR: No chest pain, palpitations, passing out, dizziness, or leg swelling  GASTROINTESTINAL: No abdominal or epigastric pain. No nausea, vomiting, or hematemesis; No diarrhea or constipation. No melena or hematochezia.  GENITOURINARY: No dysuria, frequency, hematuria, or incontinence  NEUROLOGICAL: No headaches, memory loss, loss of strength, numbness, or tremors  SKIN: No itching, burning, rashes, or lesions   LYMPH Nodes: No enlarged glands  ENDOCRINE: No heat or cold intolerance; No hair loss  MUSCULOSKELETAL: No joint pain or swelling; No muscle, back, or extremity pain  PSYCHIATRIC: No depression, anxiety, mood swings, or difficulty sleeping  HEME/LYMPH: No easy bruising, or bleeding gums  ALLERGY AND IMMUNOLOGIC: No hives or eczema	    [x ] All others negative	  [ ] Unable to obtain    PHYSICAL EXAM:  T(C): 36.8 (04-22-23 @ 05:35), Max: 36.9 (04-21-23 @ 18:44)  HR: 74 (04-22-23 @ 05:35) (65 - 99)  BP: 106/62 (04-22-23 @ 05:35) (95/58 - 116/96)  RR: 18 (04-22-23 @ 05:35) (18 - 18)  SpO2: 94% (04-22-23 @ 05:35) (93% - 98%)  Wt(kg): --  I&O's Summary    21 Apr 2023 07:01  -  22 Apr 2023 07:00  --------------------------------------------------------  IN: 220 mL / OUT: 1095 mL / NET: -875 mL        Appearance: Normal	  HEENT:   Normal oral mucosa, PERRL, EOMI	  Lymphatic: No lymphadenopathy  Cardiovascular: Normal S1 S2, No JVD, + murmurs, No edema  Respiratory: rhonchi  Psychiatry: A & O x 3, Mood & affect appropriate  Gastrointestinal:  Soft, Non-tender, + BS	  Skin: No rashes, No ecchymoses, No cyanosis	  Neurologic: Non-focal  Extremities: Normal range of motion, No clubbing, cyanosis or edema  Vascular: Peripheral pulses palpable 2+ bilaterally    MEDICATIONS  (STANDING):  atorvastatin 40 milliGRAM(s) Oral at bedtime  bisacodyl 5 milliGRAM(s) Oral every 12 hours  chlorhexidine 4% Liquid 1 Application(s) Topical <User Schedule>  heparin  Infusion.  Unit(s)/Hr (18 mL/Hr) IV Continuous <Continuous>  levothyroxine 137 MICROGram(s) Oral daily  metoprolol succinate  milliGRAM(s) Oral daily  midodrine. 10 milliGRAM(s) Oral <User Schedule>  nystatin Powder 1 Application(s) Topical two times a day  senna 2 Tablet(s) Oral at bedtime      TELEMETRY: 	    ECG:  	  RADIOLOGY:  OTHER: 	  	  LABS:	 	    CARDIAC MARKERS:                          7.7    11.06 )-----------( 164      ( 22 Apr 2023 06:51 )             24.4     04-21    134<L>  |  97  |  49<H>  ----------------------------<  121<H>  4.2   |  27  |  5.02<H>    Ca    7.8<L>      21 Apr 2023 06:37      proBNP:   Lipid Profile:   HgA1c:   TSH:   PTT - ( 22 Apr 2023 06:51 )  PTT:63.1 sec      Assessment and plan  ---------------------------  77y m pmh BPH, Chronic venous insufficiency, HLD Hypothyroidism, S/P CABG x 2, S/P mitral valve replacement, SP Status post angioplasty, Sp TKR rt, Anasarca Pt comes to ed c/o shortness of breath for past month w progressive worsening of anasarca w swelling of scrotum and diff urinating, Has been noncompliant on diuretics for past two weeks w 20lb weight gain over past month. No fever and chills, sputum, nvdc, cp. PE Adult male lying stretcher w maked swelling to josh lower extr, w pitting edema,  chest  scant josh crackles w slight wheeze and prolonged exp phase  chf acute on chronic sec to RV dysfunction  tele, AFib hr controlled  will adjust cardiac meds  +BC i bottle, MSSA , s/p ppm explant  s/p PPM and LEAD extraction  oob too chair  PICC line needs 6 weeks of abx  acute on chronic renal failure/ renal appreciated  acute renal failure/ hematuria/ac was held awaiting renal eval/urology called awaiting ct abdomen and pelvis has ordered since last night awaiting results  beta blocker sed to rate control and hx of cad, s/p CABG  no further hematuria, start iv heparin low dose observe closely so far so good  on HD ?need of bumex  vanco level noted  cardiomyopathy can not tolerate ACE/ARB  a.fib hr is well controlled  hematuria, ac is held urology called, fu cbc closely discussed with ACP mat re start if hematuria has resolved  no further hematuria started on ac keep ptt 50 to 70  awaiting pathology  hypotension bumex decreased to daily  will add midodrine specially if needs hd  may consider to re start on Eliquis, will hold for now  consider dc planning as out pt HD, dc Bumex  dc planning as pt is scheduled for out pt HD/ shiley dialysis catheter

## 2023-04-22 NOTE — PROGRESS NOTE ADULT - SUBJECTIVE AND OBJECTIVE BOX
date of service: 04-22-23 @ 11:13  afebriel  REVIEW OF SYSTEMS:  CONSTITUTIONAL: No fever,  no  weight loss  ENT:  No  tinnitus,   no   vertigo  NECK: No pain or stiffness  RESPIRATORY: No cough, wheezing, chills or hemoptysis;    No Shortness of Breath  CARDIOVASCULAR: No chest pain, palpitations, dizziness  GASTROINTESTINAL: No abdominal or epigastric pain. No nausea, vomiting, or hematemesis; No diarrhea  No melena or hematochezia.  GENITOURINARY: No dysuria, frequency, hematuria, or incontinence  NEUROLOGICAL: No headaches  SKIN: No itching,  no   rash  LYMPH Nodes: No enlarged glands  ENDOCRINE: No heat or cold intolerance  MUSCULOSKELETAL: No joint pain or swelling  PSYCHIATRIC: No depression, anxiety  HEME/LYMPH: No easy bruising, or bleeding gums  ALLERGY AND IMMUNOLOGIC: No hives or eczema	    MEDICATIONS  (STANDING):  atorvastatin 40 milliGRAM(s) Oral at bedtime  bisacodyl 5 milliGRAM(s) Oral every 12 hours  chlorhexidine 4% Liquid 1 Application(s) Topical <User Schedule>  heparin  Infusion.  Unit(s)/Hr (18 mL/Hr) IV Continuous <Continuous>  levothyroxine 137 MICROGram(s) Oral daily  metoprolol succinate  milliGRAM(s) Oral daily  midodrine. 10 milliGRAM(s) Oral <User Schedule>  nystatin Powder 1 Application(s) Topical two times a day  senna 2 Tablet(s) Oral at bedtime    MEDICATIONS  (PRN):  acetaminophen     Tablet .. 650 milliGRAM(s) Oral every 6 hours PRN Mild Pain (1 - 3)  heparin   Injectable 8000 Unit(s) IV Push every 6 hours PRN For aPTT less than 40  heparin   Injectable 4000 Unit(s) IV Push every 6 hours PRN For aPTT between 40 - 57  sodium chloride 0.9% lock flush 10 milliLiter(s) IV Push every 1 hour PRN Pre/post blood products, medications, blood draw, and to maintain line patency      Vital Signs Last 24 Hrs  T(C): 36.8 (22 Apr 2023 05:35), Max: 36.9 (21 Apr 2023 18:44)  T(F): 98.2 (22 Apr 2023 05:35), Max: 98.4 (21 Apr 2023 18:44)  HR: 74 (22 Apr 2023 05:35) (65 - 99)  BP: 106/62 (22 Apr 2023 05:35) (95/58 - 116/96)  BP(mean): --  RR: 18 (22 Apr 2023 05:35) (18 - 18)  SpO2: 94% (22 Apr 2023 05:35) (93% - 98%)    Parameters below as of 22 Apr 2023 05:35  Patient On (Oxygen Delivery Method): nasal cannula      CAPILLARY BLOOD GLUCOSE        I&O's Summary    21 Apr 2023 07:01  -  22 Apr 2023 07:00  --------------------------------------------------------  IN: 220 mL / OUT: 1095 mL / NET: -875 mL          Appearance: Normal	  HEENT:   Normal oral mucosa, PERRL, EOMI	  Lymphatic: No lymphadenopathy  Cardiovascular: Normal S1 S2, No JVD  Respiratory: Lungs clear to auscultation	  Gastrointestinal:  Soft, Non-tender, + BS	  Skin: No rash, No ecchymoses	  Extremities:     LABS:                        7.7    11.06 )-----------( 164      ( 22 Apr 2023 06:51 )             24.4     04-21    134<L>  |  97  |  49<H>  ----------------------------<  121<H>  4.2   |  27  |  5.02<H>    Ca    7.8<L>      21 Apr 2023 06:37      PTT - ( 22 Apr 2023 06:51 )  PTT:63.1 sec                        Consultant(s) Notes Reviewed:      Care Discussed with Consultants/Other Providers:

## 2023-04-23 LAB
ANION GAP SERPL CALC-SCNC: 14 MMOL/L — SIGNIFICANT CHANGE UP (ref 5–17)
APTT BLD: 48.7 SEC — HIGH (ref 27.5–35.5)
APTT BLD: 58.6 SEC — HIGH (ref 27.5–35.5)
APTT BLD: 62.1 SEC — HIGH (ref 27.5–35.5)
BUN SERPL-MCNC: 44 MG/DL — HIGH (ref 7–23)
CALCIUM SERPL-MCNC: 8.1 MG/DL — LOW (ref 8.4–10.5)
CHLORIDE SERPL-SCNC: 95 MMOL/L — LOW (ref 96–108)
CO2 SERPL-SCNC: 24 MMOL/L — SIGNIFICANT CHANGE UP (ref 22–31)
CREAT SERPL-MCNC: 4.95 MG/DL — HIGH (ref 0.5–1.3)
EGFR: 11 ML/MIN/1.73M2 — LOW
GLUCOSE SERPL-MCNC: 110 MG/DL — HIGH (ref 70–99)
HCT VFR BLD CALC: 23.3 % — LOW (ref 39–50)
HGB BLD-MCNC: 7.4 G/DL — LOW (ref 13–17)
MCHC RBC-ENTMCNC: 29 PG — SIGNIFICANT CHANGE UP (ref 27–34)
MCHC RBC-ENTMCNC: 31.8 GM/DL — LOW (ref 32–36)
MCV RBC AUTO: 91.4 FL — SIGNIFICANT CHANGE UP (ref 80–100)
NRBC # BLD: 0 /100 WBCS — SIGNIFICANT CHANGE UP (ref 0–0)
PLATELET # BLD AUTO: 179 K/UL — SIGNIFICANT CHANGE UP (ref 150–400)
POTASSIUM SERPL-MCNC: 4.3 MMOL/L — SIGNIFICANT CHANGE UP (ref 3.5–5.3)
POTASSIUM SERPL-SCNC: 4.3 MMOL/L — SIGNIFICANT CHANGE UP (ref 3.5–5.3)
RBC # BLD: 2.55 M/UL — LOW (ref 4.2–5.8)
RBC # FLD: 14.6 % — HIGH (ref 10.3–14.5)
SODIUM SERPL-SCNC: 133 MMOL/L — LOW (ref 135–145)
WBC # BLD: 12.67 K/UL — HIGH (ref 3.8–10.5)
WBC # FLD AUTO: 12.67 K/UL — HIGH (ref 3.8–10.5)

## 2023-04-23 RX ORDER — OXYCODONE HYDROCHLORIDE 5 MG/1
5 TABLET ORAL ONCE
Refills: 0 | Status: DISCONTINUED | OUTPATIENT
Start: 2023-04-23 | End: 2023-04-23

## 2023-04-23 RX ORDER — MIDODRINE HYDROCHLORIDE 2.5 MG/1
10 TABLET ORAL THREE TIMES A DAY
Refills: 0 | Status: DISCONTINUED | OUTPATIENT
Start: 2023-04-23 | End: 2023-05-01

## 2023-04-23 RX ADMIN — MIDODRINE HYDROCHLORIDE 10 MILLIGRAM(S): 2.5 TABLET ORAL at 18:15

## 2023-04-23 RX ADMIN — CHLORHEXIDINE GLUCONATE 1 APPLICATION(S): 213 SOLUTION TOPICAL at 05:12

## 2023-04-23 RX ADMIN — Medication 650 MILLIGRAM(S): at 02:15

## 2023-04-23 RX ADMIN — OXYCODONE HYDROCHLORIDE 5 MILLIGRAM(S): 5 TABLET ORAL at 03:34

## 2023-04-23 RX ADMIN — OXYCODONE HYDROCHLORIDE 5 MILLIGRAM(S): 5 TABLET ORAL at 15:00

## 2023-04-23 RX ADMIN — OXYCODONE HYDROCHLORIDE 5 MILLIGRAM(S): 5 TABLET ORAL at 04:15

## 2023-04-23 RX ADMIN — OXYCODONE HYDROCHLORIDE 5 MILLIGRAM(S): 5 TABLET ORAL at 14:02

## 2023-04-23 RX ADMIN — NYSTATIN CREAM 1 APPLICATION(S): 100000 CREAM TOPICAL at 05:13

## 2023-04-23 RX ADMIN — SENNA PLUS 2 TABLET(S): 8.6 TABLET ORAL at 22:56

## 2023-04-23 RX ADMIN — ATORVASTATIN CALCIUM 40 MILLIGRAM(S): 80 TABLET, FILM COATED ORAL at 22:59

## 2023-04-23 RX ADMIN — HEPARIN SODIUM 2400 UNIT(S)/HR: 5000 INJECTION INTRAVENOUS; SUBCUTANEOUS at 14:51

## 2023-04-23 RX ADMIN — MIDODRINE HYDROCHLORIDE 10 MILLIGRAM(S): 2.5 TABLET ORAL at 11:46

## 2023-04-23 RX ADMIN — HEPARIN SODIUM 2400 UNIT(S)/HR: 5000 INJECTION INTRAVENOUS; SUBCUTANEOUS at 20:42

## 2023-04-23 RX ADMIN — NYSTATIN CREAM 1 APPLICATION(S): 100000 CREAM TOPICAL at 18:16

## 2023-04-23 RX ADMIN — Medication 137 MICROGRAM(S): at 05:12

## 2023-04-23 RX ADMIN — HEPARIN SODIUM 2400 UNIT(S)/HR: 5000 INJECTION INTRAVENOUS; SUBCUTANEOUS at 21:46

## 2023-04-23 RX ADMIN — Medication 650 MILLIGRAM(S): at 01:33

## 2023-04-23 RX ADMIN — HEPARIN SODIUM 2400 UNIT(S)/HR: 5000 INJECTION INTRAVENOUS; SUBCUTANEOUS at 07:58

## 2023-04-23 NOTE — PROGRESS NOTE ADULT - SUBJECTIVE AND OBJECTIVE BOX
date of service: 04-23-23 @ 12:37  afebriel  REVIEW OF SYSTEMS:  CONSTITUTIONAL: No fever,  no  weight loss  ENT:  No  tinnitus,   no   vertigo  NECK: No pain or stiffness  RESPIRATORY: No cough, wheezing, chills or hemoptysis;    No Shortness of Breath  CARDIOVASCULAR: No chest pain, palpitations, dizziness  GASTROINTESTINAL: No abdominal or epigastric pain. No nausea, vomiting, or hematemesis; No diarrhea  No melena or hematochezia.  GENITOURINARY: No dysuria, frequency, hematuria, or incontinence  NEUROLOGICAL: No headaches  SKIN: No itching,  no   rash  LYMPH Nodes: No enlarged glands  ENDOCRINE: No heat or cold intolerance  MUSCULOSKELETAL: No joint pain or swelling  PSYCHIATRIC: No depression, anxiety  HEME/LYMPH: No easy bruising, or bleeding gums  ALLERGY AND IMMUNOLOGIC: No hives or eczema	    MEDICATIONS  (STANDING):  atorvastatin 40 milliGRAM(s) Oral at bedtime  bisacodyl 5 milliGRAM(s) Oral every 12 hours  chlorhexidine 4% Liquid 1 Application(s) Topical <User Schedule>  heparin  Infusion.  Unit(s)/Hr (18 mL/Hr) IV Continuous <Continuous>  levothyroxine 137 MICROGram(s) Oral daily  metoprolol succinate  milliGRAM(s) Oral daily  midodrine. 10 milliGRAM(s) Oral three times a day  nystatin Powder 1 Application(s) Topical two times a day  senna 2 Tablet(s) Oral at bedtime    MEDICATIONS  (PRN):  acetaminophen     Tablet .. 650 milliGRAM(s) Oral every 6 hours PRN Mild Pain (1 - 3)  heparin   Injectable 4000 Unit(s) IV Push every 6 hours PRN For aPTT between 40 - 57  heparin   Injectable 8000 Unit(s) IV Push every 6 hours PRN For aPTT less than 40  oxyCODONE    IR 5 milliGRAM(s) Oral every 8 hours PRN Moderate Pain (4 - 6)  sodium chloride 0.9% lock flush 10 milliLiter(s) IV Push every 1 hour PRN Pre/post blood products, medications, blood draw, and to maintain line patency      Vital Signs Last 24 Hrs  T(C): 36.7 (23 Apr 2023 11:52), Max: 37.2 (22 Apr 2023 20:26)  T(F): 98.1 (23 Apr 2023 11:52), Max: 98.9 (22 Apr 2023 20:26)  HR: 77 (23 Apr 2023 11:52) (77 - 93)  BP: 95/58 (23 Apr 2023 11:52) (93/60 - 107/64)  BP(mean): --  RR: 18 (23 Apr 2023 11:52) (18 - 19)  SpO2: 93% (23 Apr 2023 11:52) (91% - 93%)    Parameters below as of 23 Apr 2023 11:52  Patient On (Oxygen Delivery Method): room air      CAPILLARY BLOOD GLUCOSE        I&O's Summary    22 Apr 2023 07:01  -  23 Apr 2023 07:00  --------------------------------------------------------  IN: 390 mL / OUT: 25 mL / NET: 365 mL    23 Apr 2023 07:01  -  23 Apr 2023 12:37  --------------------------------------------------------  IN: 180 mL / OUT: 0 mL / NET: 180 mL          Appearance: Normal	  HEENT:   Normal oral mucosa, PERRL, EOMI	  Lymphatic: No lymphadenopathy  Cardiovascular: Normal S1 S2, No JVD  Respiratory: Lungs clear to auscultation	  Gastrointestinal:  Soft, Non-tender, + BS	  Skin: No rash, No ecchymoses	  Extremities:  c/c  edema     LABS:                        7.4    12.67 )-----------( 179      ( 23 Apr 2023 07:18 )             23.3     04-23    133<L>  |  95<L>  |  44<H>  ----------------------------<  110<H>  4.3   |  24  |  4.95<H>    Ca    8.1<L>      23 Apr 2023 07:23      PTT - ( 23 Apr 2023 07:19 )  PTT:48.7 sec                        Consultant(s) Notes Reviewed:      Care Discussed with Consultants/Other Providers:

## 2023-04-23 NOTE — PROGRESS NOTE ADULT - ASSESSMENT
77  year old male    h/o  CAD s/p stent , asa.      A-fib/ PPM, , hypothyroid, and total left knee replacement    prior  PPM  interrogation  with  [facility]/   s/p  brief   bursts  of  afib/ , on prior visit   Ct chest, retrosternal hematoma.  mod left pl effusion    was  on bumex/  aldactone        *    admitted   for  worsening   pedal  edema          component  of  cellulitis   and  from  c/c  diastolic  chf,/  h/o  l/edema  of L  leg         pt  admits to  being  non complaint with  meds        *   h/o    c/c AFIB,   has  PPM         on eliquis     *    Anemia, , hb stable, had  been seen by  gi  eval dr joann sanchez in past     *    CAD,     cath, with 2 vessel disease,  s/p  CABG and  MVR  surg d r marni   *      h/o  Afib on    eliquis          c/c leg redness/  4 +  edema, on keflex, has  improved    *     echo,  on 7/2022,  ef  35/ new/ severe  TR          cath,   was non  obstructive    *    on synthroid          on asa/  torpol/  eliquis          crt  noted,   from  lasix// aldactone.  leg  swelling  is  lessening   *   MSSA  bacterinia           was  on iv ancef.  ,  rpt bcx    are  negative          explant pf  ppm  and  Micra  placement on  3/30/23. dr sunitha rocha   *    Echo,  ef  25,  mod  AI.  severe  TR        PICC  line  and  extended  course  of  ab.        BEULAH,  /   ?  AIN,  hence   ab was  switched   to iv vanco,  per  ID         Afib. on iv heparin         crt   was  9,9, now  on HD            s/p  renal bx on 4/ 14.         still awaiting  path report/   needs  Permcath/  d/c planning to rehab/ pt very eager to leave         card /  dr mikayla dick         rad< from: TTE with Doppler (w/Cont) (07.05.22 @ 09:42) >  Conclusions:  Endocardial visualization enhanced with intravenous  injection of Ultrasonic Enhancing Agent (Definity).  Severe left ventricular enlargement.  Estimated ejection fraction 35%. Diffuse hypokinesis, with  inferior akinesis.  Bioprosthetic mitral valve with normal function.  Right ventricular enlargement with decreased right  ventricular systolic function.  A device wire is noted in the right heart.  ------------------------------------------------------------------------  Confirmed on  7/5/2022 - 12:16:10 by Kristian    < end of copied text >

## 2023-04-23 NOTE — PROGRESS NOTE ADULT - SUBJECTIVE AND OBJECTIVE BOX
Date of Service: 04-23-23 @ 08:59           CARDIOLOGY     PROGRESS  NOTE   ________________________________________________    CHIEF COMPLAINT:Patient is a 77y old  Male who presents with a chief complaint of Chart Reviewed, Events noted  " 77  year old male h/o  CAD s/p stent , asa. A-fib/ PPM, , hypothyroid, and total left knee replacement  prior  PPM  interrogation  with  [facility]/   s/p  brief   bursts  of  afib/ , on prior visit. Ct chest, retrosternal hematoma.  mod left pl effusion was  on bumex/  aldactone admitted with  worsening   pedal  edema  component  of  cellulitis   and  from  c/c  diastolic  chf,/  h/o  l/edema  of L  leg pt  admits to  being  non complaint with  meds"   no complain    	  REVIEW OF SYSTEMS:  CONSTITUTIONAL: No fever, weight loss, or fatigue  EYES: No eye pain, visual disturbances, or discharge  ENT:  No difficulty hearing, tinnitus, vertigo; No sinus or throat pain  NECK: No pain or stiffness  RESPIRATORY: No cough, wheezing, chills or hemoptysis; No Shortness of Breath  CARDIOVASCULAR: No chest pain, palpitations, passing out, dizziness, or leg swelling  GASTROINTESTINAL: No abdominal or epigastric pain. No nausea, vomiting, or hematemesis; No diarrhea or constipation. No melena or hematochezia.  GENITOURINARY: No dysuria, frequency, hematuria, or incontinence  NEUROLOGICAL: No headaches, memory loss, loss of strength, numbness, or tremors  SKIN: No itching, burning, rashes, or lesions   LYMPH Nodes: No enlarged glands  ENDOCRINE: No heat or cold intolerance; No hair loss  MUSCULOSKELETAL: No joint pain or swelling; No muscle, back, or extremity pain  PSYCHIATRIC: No depression, anxiety, mood swings, or difficulty sleeping  HEME/LYMPH: No easy bruising, or bleeding gums  ALLERGY AND IMMUNOLOGIC: No hives or eczema	    [x ] All others negative	  [ ] Unable to obtain    PHYSICAL EXAM:  T(C): 36.5 (04-23-23 @ 08:11), Max: 37.2 (04-22-23 @ 20:26)  HR: 82 (04-23-23 @ 08:11) (78 - 93)  BP: 93/60 (04-23-23 @ 08:11) (93/60 - 107/64)  RR: 19 (04-23-23 @ 08:11) (18 - 19)  SpO2: 92% (04-23-23 @ 08:11) (91% - 98%)  Wt(kg): --  I&O's Summary    22 Apr 2023 07:01  -  23 Apr 2023 07:00  --------------------------------------------------------  IN: 390 mL / OUT: 25 mL / NET: 365 mL        Appearance: Normal	  HEENT:   Normal oral mucosa, PERRL, EOMI	  Lymphatic: No lymphadenopathy  Cardiovascular: Normal S1 S2, No JVD, + murmurs, + trace edema  Respiratory: rhonchi  Psychiatry: A & O x 3, Mood & affect appropriate  Gastrointestinal:  Soft, Non-tender, + BS	  Skin: No rashes, No ecchymoses, No cyanosis	  Neurologic: Non-focal  Extremities: Normal range of motion, No clubbing, cyanosis , + trace edema  Vascular: Peripheral pulses palpable 2+ bilaterally    MEDICATIONS  (STANDING):  atorvastatin 40 milliGRAM(s) Oral at bedtime  bisacodyl 5 milliGRAM(s) Oral every 12 hours  chlorhexidine 4% Liquid 1 Application(s) Topical <User Schedule>  heparin  Infusion.  Unit(s)/Hr (18 mL/Hr) IV Continuous <Continuous>  levothyroxine 137 MICROGram(s) Oral daily  metoprolol succinate  milliGRAM(s) Oral daily  midodrine. 10 milliGRAM(s) Oral <User Schedule>  nystatin Powder 1 Application(s) Topical two times a day  senna 2 Tablet(s) Oral at bedtime      TELEMETRY: 	    ECG:  	  RADIOLOGY:  OTHER: 	  	  LABS:	 	    CARDIAC MARKERS:                                7.4    12.67 )-----------( 179      ( 23 Apr 2023 07:18 )             23.3     04-23    133<L>  |  95<L>  |  44<H>  ----------------------------<  110<H>  4.3   |  24  |  4.95<H>    Ca    8.1<L>      23 Apr 2023 07:23      proBNP:   Lipid Profile:   HgA1c:   TSH:   PTT - ( 23 Apr 2023 07:19 )  PTT:48.7 sec      Assessment and plan  ---------------------------  77y m pmh BPH, Chronic venous insufficiency, HLD Hypothyroidism, S/P CABG x 2, S/P mitral valve replacement, SP Status post angioplasty, Sp TKR rt, Anasarca Pt comes to ed c/o shortness of breath for past month w progressive worsening of anasarca w swelling of scrotum and diff urinating, Has been noncompliant on diuretics for past two weeks w 20lb weight gain over past month. No fever and chills, sputum, nvdc, cp. PE Adult male lying stretcher w maked swelling to josh lower extr, w pitting edema,  chest  scant josh crackles w slight wheeze and prolonged exp phase  chf acute on chronic sec to RV dysfunction  tele, AFib hr controlled  will adjust cardiac meds  +BC i bottle, MSSA , s/p ppm explant  s/p PPM and LEAD extraction  oob too chair  PICC line needs 6 weeks of abx  acute on chronic renal failure/ renal appreciated  acute renal failure/ hematuria/ac was held awaiting renal eval/urology called awaiting ct abdomen and pelvis has ordered since last night awaiting results  beta blocker sed to rate control and hx of cad, s/p CABG  no further hematuria, start iv heparin low dose observe closely so far so good  on HD ?need of bumex  vanco level noted  cardiomyopathy can not tolerate ACE/ARB  a.fib hr is well controlled  hematuria, ac is held urology called, fu cbc closely discussed with ACP mat re start if hematuria has resolved  no further hematuria started on ac keep ptt 50 to 70  awaiting pathology  will add midodrine specially if needs hd  may consider to re start on Eliquis, will hold for now consider dc planning as out pt HD, dc Bumex  dc planning as pt is scheduled for out pt HD/ shiley dialysis catheter  start on midodrine 10 mg tid  awaiting pathology

## 2023-04-24 LAB
ANION GAP SERPL CALC-SCNC: 13 MMOL/L — SIGNIFICANT CHANGE UP (ref 5–17)
APTT BLD: 55.8 SEC — HIGH (ref 27.5–35.5)
APTT BLD: 59.1 SEC — HIGH (ref 27.5–35.5)
BUN SERPL-MCNC: 54 MG/DL — HIGH (ref 7–23)
CALCIUM SERPL-MCNC: 8.2 MG/DL — LOW (ref 8.4–10.5)
CHLORIDE SERPL-SCNC: 92 MMOL/L — LOW (ref 96–108)
CO2 SERPL-SCNC: 25 MMOL/L — SIGNIFICANT CHANGE UP (ref 22–31)
CREAT SERPL-MCNC: 5.84 MG/DL — HIGH (ref 0.5–1.3)
EGFR: 9 ML/MIN/1.73M2 — LOW
GLUCOSE SERPL-MCNC: 105 MG/DL — HIGH (ref 70–99)
HCT VFR BLD CALC: 22.4 % — LOW (ref 39–50)
HGB BLD-MCNC: 7.1 G/DL — LOW (ref 13–17)
MCHC RBC-ENTMCNC: 28.4 PG — SIGNIFICANT CHANGE UP (ref 27–34)
MCHC RBC-ENTMCNC: 31.7 GM/DL — LOW (ref 32–36)
MCV RBC AUTO: 89.6 FL — SIGNIFICANT CHANGE UP (ref 80–100)
NRBC # BLD: 0 /100 WBCS — SIGNIFICANT CHANGE UP (ref 0–0)
PLATELET # BLD AUTO: 215 K/UL — SIGNIFICANT CHANGE UP (ref 150–400)
POTASSIUM SERPL-MCNC: 4.8 MMOL/L — SIGNIFICANT CHANGE UP (ref 3.5–5.3)
POTASSIUM SERPL-SCNC: 4.8 MMOL/L — SIGNIFICANT CHANGE UP (ref 3.5–5.3)
RBC # BLD: 2.5 M/UL — LOW (ref 4.2–5.8)
RBC # FLD: 14.5 % — SIGNIFICANT CHANGE UP (ref 10.3–14.5)
SODIUM SERPL-SCNC: 130 MMOL/L — LOW (ref 135–145)
WBC # BLD: 12.61 K/UL — HIGH (ref 3.8–10.5)
WBC # FLD AUTO: 12.61 K/UL — HIGH (ref 3.8–10.5)

## 2023-04-24 PROCEDURE — 99232 SBSQ HOSP IP/OBS MODERATE 35: CPT

## 2023-04-24 PROCEDURE — 99232 SBSQ HOSP IP/OBS MODERATE 35: CPT | Mod: GC

## 2023-04-24 RX ADMIN — MIDODRINE HYDROCHLORIDE 10 MILLIGRAM(S): 2.5 TABLET ORAL at 13:35

## 2023-04-24 RX ADMIN — Medication 100 MILLIGRAM(S): at 05:54

## 2023-04-24 RX ADMIN — HEPARIN SODIUM 2600 UNIT(S)/HR: 5000 INJECTION INTRAVENOUS; SUBCUTANEOUS at 14:04

## 2023-04-24 RX ADMIN — NYSTATIN CREAM 1 APPLICATION(S): 100000 CREAM TOPICAL at 05:59

## 2023-04-24 RX ADMIN — Medication 5 MILLIGRAM(S): at 18:20

## 2023-04-24 RX ADMIN — ATORVASTATIN CALCIUM 40 MILLIGRAM(S): 80 TABLET, FILM COATED ORAL at 21:36

## 2023-04-24 RX ADMIN — Medication 5 MILLIGRAM(S): at 05:54

## 2023-04-24 RX ADMIN — MIDODRINE HYDROCHLORIDE 10 MILLIGRAM(S): 2.5 TABLET ORAL at 18:20

## 2023-04-24 RX ADMIN — HEPARIN SODIUM 2600 UNIT(S)/HR: 5000 INJECTION INTRAVENOUS; SUBCUTANEOUS at 16:50

## 2023-04-24 RX ADMIN — SENNA PLUS 2 TABLET(S): 8.6 TABLET ORAL at 21:36

## 2023-04-24 RX ADMIN — Medication 137 MICROGRAM(S): at 05:59

## 2023-04-24 RX ADMIN — Medication 650 MILLIGRAM(S): at 14:29

## 2023-04-24 RX ADMIN — MIDODRINE HYDROCHLORIDE 10 MILLIGRAM(S): 2.5 TABLET ORAL at 05:59

## 2023-04-24 RX ADMIN — HEPARIN SODIUM 2600 UNIT(S)/HR: 5000 INJECTION INTRAVENOUS; SUBCUTANEOUS at 08:16

## 2023-04-24 RX ADMIN — OXYCODONE HYDROCHLORIDE 5 MILLIGRAM(S): 5 TABLET ORAL at 18:20

## 2023-04-24 RX ADMIN — OXYCODONE HYDROCHLORIDE 5 MILLIGRAM(S): 5 TABLET ORAL at 07:57

## 2023-04-24 RX ADMIN — OXYCODONE HYDROCHLORIDE 5 MILLIGRAM(S): 5 TABLET ORAL at 09:00

## 2023-04-24 RX ADMIN — CHLORHEXIDINE GLUCONATE 1 APPLICATION(S): 213 SOLUTION TOPICAL at 07:36

## 2023-04-24 RX ADMIN — HEPARIN SODIUM 4000 UNIT(S): 5000 INJECTION INTRAVENOUS; SUBCUTANEOUS at 08:20

## 2023-04-24 RX ADMIN — Medication 650 MILLIGRAM(S): at 15:29

## 2023-04-24 RX ADMIN — NYSTATIN CREAM 1 APPLICATION(S): 100000 CREAM TOPICAL at 17:58

## 2023-04-24 RX ADMIN — HEPARIN SODIUM 2400 UNIT(S)/HR: 5000 INJECTION INTRAVENOUS; SUBCUTANEOUS at 07:36

## 2023-04-24 NOTE — PROGRESS NOTE ADULT - SUBJECTIVE AND OBJECTIVE BOX
MARIELOS JANEE 77y [id number]    Patient is a 77y old  Male who presents with a chief complaint of Chart Reviewed, Events noted  " 77  year old male h/o  CAD s/p stent , asa. A-fib/ PPM, , hypothyroid, and total left knee replacement  prior  PPM  interrogation  with  [facility]/   s/p  brief   bursts  of  afib/ , on prior visit. Ct chest, retrosternal hematoma.  mod left pl effusion was  on bumex/  aldactone admitted with  worsening   pedal  edema  component  of  cellulitis   and  from  c/c  diastolic  chf,/  h/o  l/edema  of L  leg pt  admits to  being  non complaint with  meds"           (05 Apr 2023 11:57)      Follow Up/CC:  ID following for bacteremia    Interval History/ROS: no fever     Allergies    Myrbetriq (Hives)  tamsulosin (Hives)  levofloxacin (Hives)  alfuzosin (Hives)    Intolerances        ANTIMICROBIALS:      MEDICATIONS  (STANDING):  atorvastatin 40 milliGRAM(s) Oral at bedtime  bisacodyl 5 milliGRAM(s) Oral every 12 hours  chlorhexidine 4% Liquid 1 Application(s) Topical <User Schedule>  heparin  Infusion.  Unit(s)/Hr (18 mL/Hr) IV Continuous <Continuous>  levothyroxine 137 MICROGram(s) Oral daily  metoprolol succinate  milliGRAM(s) Oral daily  midodrine. 10 milliGRAM(s) Oral three times a day  nystatin Powder 1 Application(s) Topical two times a day  senna 2 Tablet(s) Oral at bedtime    MEDICATIONS  (PRN):  acetaminophen     Tablet .. 650 milliGRAM(s) Oral every 6 hours PRN Mild Pain (1 - 3)  heparin   Injectable 8000 Unit(s) IV Push every 6 hours PRN For aPTT less than 40  heparin   Injectable 4000 Unit(s) IV Push every 6 hours PRN For aPTT between 40 - 57  oxyCODONE    IR 5 milliGRAM(s) Oral every 8 hours PRN Moderate Pain (4 - 6)  sodium chloride 0.9% lock flush 10 milliLiter(s) IV Push every 1 hour PRN Pre/post blood products, medications, blood draw, and to maintain line patency        Vital Signs Last 24 Hrs  T(C): 36.9 (24 Apr 2023 04:30), Max: 37.1 (23 Apr 2023 20:07)  T(F): 98.4 (24 Apr 2023 04:30), Max: 98.7 (23 Apr 2023 20:07)  HR: 99 (24 Apr 2023 04:30) (61 - 107)  BP: 111/67 (24 Apr 2023 04:30) (95/58 - 125/57)  BP(mean): --  RR: 18 (24 Apr 2023 04:30) (18 - 19)  SpO2: 96% (24 Apr 2023 04:30) (93% - 98%)    Parameters below as of 24 Apr 2023 04:30  Patient On (Oxygen Delivery Method): room air        CBC Full  -  ( 24 Apr 2023 07:03 )  WBC Count : 12.61 K/uL  RBC Count : 2.50 M/uL  Hemoglobin : 7.1 g/dL  Hematocrit : 22.4 %  Platelet Count - Automated : 215 K/uL  Mean Cell Volume : 89.6 fl  Mean Cell Hemoglobin : 28.4 pg  Mean Cell Hemoglobin Concentration : 31.7 gm/dL  Auto Neutrophil # : x  Auto Lymphocyte # : x  Auto Monocyte # : x  Auto Eosinophil # : x  Auto Basophil # : x  Auto Neutrophil % : x  Auto Lymphocyte % : x  Auto Monocyte % : x  Auto Eosinophil % : x  Auto Basophil % : x    04-24    130<L>  |  92<L>  |  54<H>  ----------------------------<  105<H>  4.8   |  25  |  5.84<H>    Ca    8.2<L>      24 Apr 2023 07:05            MICROBIOLOGY:  Clean Catch Clean Catch (Midstream)  04-15-23   No growth  --  --      Clean Catch Clean Catch (Midstream)  04-04-23   No growth  --  --      .Other Other  03-30-23   No fungus isolated at 3 weeks.  --  --      .Blood Blood-Peripheral  03-28-23   No Growth Final  --  --      .Blood  03-25-23   Growth in anaerobic bottle: Staphylococcus aureus  ***Blood Panel PCR results on this specimen are available  approximately 3 hours after the Gram stain result.***  Gram stain, PCR, and/or culture results may not always  correspond due to difference in methodologies.  ************************************************************  This PCR assay was performed by multiplex PCR. This  Assay tests for 66 bacterial and resistance gene targets.  Please refer to the [facility] test directory  at [email or web address] for details.  --  Blood Culture PCR  Staphylococcus aureus      .Blood  03-25-23   No Growth Final  --  --              v            RADIOLOGY

## 2023-04-24 NOTE — PROGRESS NOTE ADULT - SUBJECTIVE AND OBJECTIVE BOX
Date of Service: 04-24-23 @ 08:26           CARDIOLOGY     PROGRESS  NOTE   ________________________________________________    CHIEF COMPLAINT:Patient is a 77y old  Male who presents with a chief complaint of Chart Reviewed, Events noted  " 77  year old male h/o  CAD s/p stent , asa. A-fib/ PPM, , hypothyroid, and total left knee replacement  prior  PPM  interrogation  with  W[facility]/   s/p  brief   bursts  of  afib/ , on prior visit. Ct chest, retrosternal hematoma.  mod left pl effusion was  on bumex/  aldactone admitted with  worsening   pedal  edema  component  of  cellulitis   and  from  c/c  diastolic  chf,/  h/o  l/edema  of L  leg pt  admits to  being  non complaint with  meds"   no complain    	  REVIEW OF SYSTEMS:  CONSTITUTIONAL: No fever, weight loss, or fatigue  EYES: No eye pain, visual disturbances, or discharge  ENT:  No difficulty hearing, tinnitus, vertigo; No sinus or throat pain  NECK: No pain or stiffness  RESPIRATORY: No cough, wheezing, chills or hemoptysis; + mild Shortness of Breath  CARDIOVASCULAR: No chest pain, palpitations, passing out, dizziness,+  mild  leg swelling  GASTROINTESTINAL: No abdominal or epigastric pain. No nausea, vomiting, or hematemesis; No diarrhea or constipation. No melena or hematochezia.  GENITOURINARY: No dysuria, frequency, hematuria, or incontinence  NEUROLOGICAL: No headaches, memory loss, loss of strength, numbness, or tremors  SKIN: No itching, burning, rashes, or lesions   LYMPH Nodes: No enlarged glands  ENDOCRINE: No heat or cold intolerance; No hair loss  MUSCULOSKELETAL: No joint pain or swelling; No muscle, back, or extremity pain  PSYCHIATRIC: No depression, anxiety, mood swings, or difficulty sleeping  HEME/LYMPH: No easy bruising, or bleeding gums  ALLERGY AND IMMUNOLOGIC: No hives or eczema	    [ ] All others negative	  [ ] Unable to obtain    PHYSICAL EXAM:  T(C): 36.9 (04-24-23 @ 04:30), Max: 37.1 (04-23-23 @ 20:07)  HR: 99 (04-24-23 @ 04:30) (61 - 107)  BP: 111/67 (04-24-23 @ 04:30) (95/58 - 125/57)  RR: 18 (04-24-23 @ 04:30) (18 - 19)  SpO2: 96% (04-24-23 @ 04:30) (93% - 98%)  Wt(kg): --  I&O's Summary    23 Apr 2023 07:01  -  24 Apr 2023 07:00  --------------------------------------------------------  IN: 620 mL / OUT: 80 mL / NET: 540 mL        Appearance: Normal	  HEENT:   Normal oral mucosa, PERRL, EOMI	  Lymphatic: No lymphadenopathy  Cardiovascular: Normal S1 S2, No JVD, + murmurs, + edema  Respiratory: rhonchi  Psychiatry: A & O x 3, Mood & affect appropriate  Gastrointestinal:  Soft, Non-tender, + BS	  Skin: No rashes, No ecchymoses, No cyanosis	  Neurologic: Non-focal  Extremities: Normal range of motion, No clubbing, cyanosis , + edema  Vascular: Peripheral pulses palpable 2+ bilaterally    MEDICATIONS  (STANDING):  atorvastatin 40 milliGRAM(s) Oral at bedtime  bisacodyl 5 milliGRAM(s) Oral every 12 hours  chlorhexidine 4% Liquid 1 Application(s) Topical <User Schedule>  heparin  Infusion.  Unit(s)/Hr (18 mL/Hr) IV Continuous <Continuous>  levothyroxine 137 MICROGram(s) Oral daily  metoprolol succinate  milliGRAM(s) Oral daily  midodrine. 10 milliGRAM(s) Oral three times a day  nystatin Powder 1 Application(s) Topical two times a day  senna 2 Tablet(s) Oral at bedtime      TELEMETRY: 	    ECG:  	  RADIOLOGY:  OTHER: 	  	  LABS:	 	    CARDIAC MARKERS:                                7.1    12.61 )-----------( 215      ( 24 Apr 2023 07:03 )             22.4     04-24    130<L>  |  92<L>  |  54<H>  ----------------------------<  105<H>  4.8   |  25  |  5.84<H>    Ca    8.2<L>      24 Apr 2023 07:05      proBNP:   Lipid Profile:   HgA1c:   TSH:   PTT - ( 24 Apr 2023 07:04 )  PTT:55.8 sec      Assessment and plan  ---------------------------  77y m pmh BPH, Chronic venous insufficiency, HLD Hypothyroidism, S/P CABG x 2, S/P mitral valve replacement, SP Status post angioplasty, Sp TKR rt, Anasarca Pt comes to ed c/o shortness of breath for past month w progressive worsening of anasarca w swelling of scrotum and diff urinating, Has been noncompliant on diuretics for past two weeks w 20lb weight gain over past month. No fever and chills, sputum, nvdc, cp. PE Adult male lying stretcher w maked swelling to josh lower extr, w pitting edema,  chest  scant josh crackles w slight wheeze and prolonged exp phase  chf acute on chronic sec to RV dysfunction  tele, AFib hr controlled  will adjust cardiac meds  +BC i bottle, MSSA , s/p ppm explant  s/p PPM and LEAD extraction  oob too chair  PICC line needs 6 weeks of abx  acute on chronic renal failure/ renal appreciated  acute renal failure/ hematuria/ac was held awaiting renal eval/urology called awaiting ct abdomen and pelvis has ordered since last night awaiting results  beta blocker sed to rate control and hx of cad, s/p CABG  no further hematuria, start iv heparin low dose observe closely so far so good  on HD ?need of bumex  vanco level noted  cardiomyopathy can not tolerate ACE/ARB  a.fib hr is well controlled  hematuria, ac is held urology called, fu cbc closely discussed with ACP mat re start if hematuria has resolved  no further hematuria started on ac keep ptt 50 to 70  awaiting pathology  will add midodrine specially if needs hd  may consider to re start on Eliquis, will hold for now consider dc planning as out pt HD, dc Bumex  dc planning as pt is scheduled for out pt HD/ shiley dialysis cathete rstart on midodrine 10 mg tid  awaiting pathologystart on midodrine 10 mg tid  awaiting pathology  dc planning in progress to rehab

## 2023-04-24 NOTE — PROGRESS NOTE ADULT - ATTENDING COMMENTS
77 year old male with PMH of Afib, CAD s/p CABG, BPH, HLD, HTN, and hypothyroidism who presented to the hospital on 3/25 with complaints of shortness of breath and wiley gain. The patient had been nonadherent with his diuretic regimen as outpatient and presented with acute on chronic decompensated heart failure. The patient was initiated on IV diuresis with significant improvement in volume status with approximately 15lb weight loss. Hospital course was further complicated by MSSA bacteremia requiring pacemaker extraction with micra implant and currently being managed with IVC abx with PICC line in place. The nephrology team was consulted for oliguric BEULAH. On review of [facility] pt noted to have a SCr of 1 on admission which since 4/1 has progressively increased to 9.66 initiated on HD 4/10. Last HD done on 4/14. Underwent kidney biopsy on 4/14. FOR HD today, no new complaints  1.  ARF--now HD dependent.  Orders reviewed in detail with fellow, HD RN.  Biopsy reviewed in detail with primarily interstitial disease c/w PYELONEPHRITIS emphasizing need for prolonged antibiotic rx.  Check PTH, VitD  2.  Hypotension on HD--may need to lower UF goals.  HD orders reviewed with fellow, HD RN  3.  Pyelonephritis--by biopsy.  Review findings with ID to assess optimal rx  discussed with med team  Bg Valerio MD  contact me on TEAMS

## 2023-04-24 NOTE — PROGRESS NOTE ADULT - SUBJECTIVE AND OBJECTIVE BOX
[facility] DIVISION OF KIDNEY DISEASES AND HYPERTENSION -- FOLLOW UP NOTE  --------------------------------------------------------------------------------  HPI: 77 year old male with PMH of Afib, CAD s/p CABG, BPH, HLD, HTN, and hypothyroidism who presented to the hospital on 3/25 with complaints of shortness of breath and wiley gain. The patient had been nonadherent with his diuretic regimen as outpatient and presented with acute on chronic decompensated heart failure. The patient was initiated on IV diuresis with significant improvement in volume status with approximately 15lb weight loss. Hospital course was further complicated by MSSA bacteremia requiring pacemaker extraction with micra implant and currently being managed with IVC abx with PICC line in place. The nephrology team was consulted for oliguric BEULAH. On review of [facility] pt noted to have a SCr of 1 on admission which since 4/1 has progressively increased to 9.66 initiated on HD 4/10. Last HD done on 4/21. Underwent kidney biopsy on 4/14- suggestive of interstitial disease likely pyelonephritis. Awaiting final biopsy results.      24 hour events/subjective:  Pt. seen and examined this morning. He denies any headaches, fevers/chills, chest pain, and abdominal pain. No fever.     PAST HISTORY  --------------------------------------------------------------------------------  No significant changes to PMH, PSH, FHx, SHx, unless otherwise noted    ALLERGIES & MEDICATIONS  --------------------------------------------------------------------------------  Allergies    Myrbetriq (Hives)  tamsulosin (Hives)  levofloxacin (Hives)  alfuzosin (Hives)    Intolerances    Standing Inpatient Medications  atorvastatin 40 milliGRAM(s) Oral at bedtime  bisacodyl 5 milliGRAM(s) Oral every 12 hours  chlorhexidine 4% Liquid 1 Application(s) Topical <User Schedule>  heparin  Infusion.  Unit(s)/Hr IV Continuous <Continuous>  levothyroxine 137 MICROGram(s) Oral daily  metoprolol succinate  milliGRAM(s) Oral daily  midodrine. 10 milliGRAM(s) Oral three times a day  nystatin Powder 1 Application(s) Topical two times a day  senna 2 Tablet(s) Oral at bedtime    PRN Inpatient Medications  acetaminophen     Tablet .. 650 milliGRAM(s) Oral every 6 hours PRN  heparin   Injectable 8000 Unit(s) IV Push every 6 hours PRN  heparin   Injectable 4000 Unit(s) IV Push every 6 hours PRN  oxyCODONE    IR 5 milliGRAM(s) Oral every 8 hours PRN  sodium chloride 0.9% lock flush 10 milliLiter(s) IV Push every 1 hour PRN    REVIEW OF SYSTEMS  --------------------------------------------------------------------------------  Gen: No fevers   Respiratory: No dyspnea  CV: No chest pain  GI: No abdominal pain  : No dysuria  MSK: No  edema  Neuro: no dizziness   All other systems were reviewed and are negative, except as noted.    VITALS/PHYSICAL EXAM  --------------------------------------------------------------------------------  T(C): 36.9 (04-24-23 @ 04:30), Max: 37.1 (04-23-23 @ 20:07)  HR: 99 (04-24-23 @ 04:30) (61 - 107)  BP: 111/67 (04-24-23 @ 04:30) (95/58 - 125/57)  RR: 18 (04-24-23 @ 04:30) (18 - 19)  SpO2: 96% (04-24-23 @ 04:30) (93% - 98%)  Wt(kg): --    04-23-23 @ 07:01  -  04-24-23 @ 07:00  --------------------------------------------------------  IN: 620 mL / OUT: 80 mL / NET: 540 mL    Physical Exam:  	Gen: NAD  	HEENT: MMM  	Pulm: CTA B/L  	CV: S1S2  	Abd: Soft, +BS   	Ext: +LE edema B/L  	Neuro: Awake  	Skin: Warm and dry  	Vascular access: RIJ non tunneled HD catheter    LABS/STUDIES  --------------------------------------------------------------------------------              7.1    12.61 >-----------<  215      [04-24-23 @ 07:03]              22.4     130  |  92  |  54  ----------------------------<  105      [04-24-23 @ 07:05]  4.8   |  25  |  5.84        Ca     8.2     [04-24-23 @ 07:05]    PTT: 55.8       [04-24-23 @ 07:04]    Creatinine Trend:  SCr 5.84 [04-24 @ 07:05]  SCr 4.95 [04-23 @ 07:23]  SCr 5.02 [04-21 @ 06:37]  SCr 4.09 [04-20 @ 07:20]  SCr 5.98 [04-17 @ 07:10]    Urinalysis - [04-13-23 @ 07:54]      Color DARK BROWN / Appearance Turbid / SG 1.026 / pH 6.5      Gluc 100 mg/dL / Ketone Trace  / Bili Negative / Urobili Negative       Blood Large / Protein 300 mg/dL / Leuk Est Large / Nitrite Negative      RBC >50 / WBC 3-5 / Hyaline 0-2 / Gran  / Sq Epi  / Non Sq Epi  / Bacteria Negative    HBsAb <3.0      [04-21-23 @ 15:53]  HBsAb Nonreact      [04-21-23 @ 15:53]  HBsAg Nonreact      [04-21-23 @ 15:53]  HBcAb Nonreact      [04-21-23 @ 15:53]  HCV 0.16, Nonreact      [04-21-23 @ 15:53]  HIV Nonreact      [04-06-23 @ 11:32]    JAGRUTI: titer 1:160, pattern Homogeneous      [04-06-23 @ 11:29]  C3 Complement 126      [04-06-23 @ 11:47]  C4 Complement 25      [04-06-23 @ 11:47]  ANCA: cANCA Negative, pANCA Negative, atypical ANCA Negative      [04-06-23 @ 11:29]  anti-GBM <0.2      [04-06-23 @ 11:46]  PLA2R: BIANCA 4.2, IFA --      [04-06-23 @ 11:46]  Immunofixation Serum:   Weak IgA Lambda Band Identified    Reference Range: None Detected      [04-06-23 @ 11:47]  SPEP Interpretation: Weak Beta-Migrating Paraprotein Identified      [04-06-23 @ 11:47]

## 2023-04-24 NOTE — PROGRESS NOTE ADULT - ASSESSMENT
77  year old male    h/o  CAD s/p stent , asa.      A-fib/ PPM, , hypothyroid, and total left knee replacement    prior  PPM  interrogation  with  [facility]/   s/p  brief   bursts  of  afib/ , on prior visit   Ct chest, retrosternal hematoma.  mod left pl effusion    was  on bumex/  aldactone        *    admitted   for  worsening   pedal  edema          component  of  cellulitis   and  from  c/c  diastolic  chf,/  h/o  l/edema  of L  leg         pt  admits to  being  non complaint with  meds        *   h/o    c/c AFIB,   has  PPM         on eliquis     *    Anemia, , hb stable, had  been seen by  gi  eval dr joann sanchez in past     *    CAD,     cath, with 2 vessel disease,  s/p  CABG and  MVR  surg d r marni   *      h/o  Afib on    eliquis          c/c leg redness/  4 +  edema, on keflex, has  improved    *     echo,  on 7/2022,  ef  35/ new/ severe  TR          cath,   was non  obstructive    *    on synthroid          on asa/  torpol/  eliquis          crt  noted,   from  lasix// aldactone.  leg  swelling  is  lessening   *   MSSA  bacterinia           was  on iv ancef.  ,  rpt bcx    are  negative          explant pf  ppm  and  Micra  placement on  3/30/23. dr sunitha rocha   *    Echo,  ef  25,  mod  AI.  severe  TR        PICC  line  and  extended  course  of  ab.        BEULAH,  /   ?  AIN,  hence   ab was  switched   to iv vanco,  per  ID         Afib. on iv heparin         crt   was  9,9, now  on HD            s/p  renal bx on 4/ 14.       still awaiting  path report/       needs  Permcath  and then,  d/c planning to rehab/ pt very eager to leave         card /  dr mikayla dick         rad< from: TTE with Doppler (w/Cont) (07.05.22 @ 09:42) >  Conclusions:  Endocardial visualization enhanced with intravenous  injection of Ultrasonic Enhancing Agent (Definity).  Severe left ventricular enlargement.  Estimated ejection fraction 35%. Diffuse hypokinesis, with  inferior akinesis.  Bioprosthetic mitral valve with normal function.  Right ventricular enlargement with decreased right  ventricular systolic function.  A device wire is noted in the right heart.  ------------------------------------------------------------------------  Confirmed on  7/5/2022 - 12:16:10 by Kristian    < end of copied text >

## 2023-04-24 NOTE — PROGRESS NOTE ADULT - ASSESSMENT
76 y/o male PMHx HTN, HLD, CAD s/p stent on ASA, A-fib, hypothyroid, CHF now presenting to the ED worsening SOB, DUNCAN, peripheral edema, scrotal edema and 20 pound unintentional weight gain over last month with MSSA bacteremia, HF    Kevin Olmos  Attending Physician   Division of Infectious Disease  Office #[phone]  Available on Microsoft Teams also  After 5pm/weekend or no response, call #[phone]

## 2023-04-24 NOTE — PROGRESS NOTE ADULT - PROBLEM SELECTOR PLAN 1
Pt. with oliguric BEULAH. Differential also includes AIN perhaps in setting of Cefazolin? vs overdiuresis. On review of [facility] pt noted to have a Scr of 1 on admission which since 4/1 has progressively increased to 7 most recently. UA with blood and also proteinuria of 1.0g.       Serological work up negative except JAGRUTI positive with homogenous pattern. Renal sonogram demonstrating a horseshoe kidney without evidence of hydronephrosis or renal calculi. Pt underwent kidney biopsy by IR on 4/14. Preliminary results ruled out active treatable disease. Will follow up final results.      Pt underwent first session HD on 4/10 via RIJ non tunneled HD catheter. Last HD done on 4/21. Plan for HD again today. Pt with no signs of renal recovery , pt will need long term dialysis for now. IR consult for tunneled HD catheter placement.   Send iron studies. Monitor labs and urine output. Avoid nephrotoxins. Dose medications as per eGFR. Pt. with oliguric BEULAH. Differential also includes AIN perhaps in setting of Cefazolin? vs overdiuresis. [facility] pt noted to have a Scr of 1 on admission which since 4/1 has progressively increased to 7 most recently. UA with blood and also proteinuria of 1.0g.       Serological work up negative except JAGRUTI positive with homogenous pattern. Renal sonogram demonstrating a horseshoe kidney without evidence of hydronephrosis or renal calculi. Pt underwent kidney biopsy by IR on 4/14. Preliminary results showed primarily interstitial disease likely pyelonephritis.       Pt underwent first session HD on 4/10 via RIJ non tunneled HD catheter. Last HD done on 4/21. Plan for HD again today. Pt with no signs of renal recovery , pt will need long term dialysis for now. IR consult for tunneled HD catheter placement reviewed.      Send iron studies. Monitor labs and urine output. Avoid nephrotoxins. Dose medications as per eGFR.    If you have any questions, please feel free to contact me  Santos William  Nephrology Fellow  [phone]/ [employer] Teams(Preferred)  (After 5pm or on weekends please page the on-call fellow).

## 2023-04-24 NOTE — PROGRESS NOTE ADULT - PROBLEM SELECTOR PLAN 1
-only 1 out of 4 positive - still suspect this is real   -off ancef due to concern for AIN  -repeat bcx negative  -likely source is toe from recent nail clipping   -ERENDIRA negative for vegetations  -s/p PPM extraction and micra placement   -EP input noted  -also with low grade fevers  -picc  -6 weeks iv abx - day 25 of 42 of abx  -potential side effects of abx explained including GI, Cdiff, allergy issues, development of resistance, etc.  -potential side effects of PICC explained including bleeding and infection  -weekly cbc, bun/creatinine - fax [phone]  -OR cx negative  -dose vanco by level for now  - check level in AM

## 2023-04-24 NOTE — PROGRESS NOTE ADULT - SUBJECTIVE AND OBJECTIVE BOX
date of service: 04-24-23 @ 08:58  afebrile  REVIEW OF SYSTEMS:  CONSTITUTIONAL: No fever,  no  weight loss  ENT:  No  tinnitus,   no   vertigo  NECK: No pain or stiffness  RESPIRATORY: No cough, wheezing, chills or hemoptysis;    No Shortness of Breath  CARDIOVASCULAR: No chest pain, palpitations, dizziness  GASTROINTESTINAL: No abdominal or epigastric pain. No nausea, vomiting, or hematemesis; No diarrhea  No melena or hematochezia.  GENITOURINARY: No dysuria, frequency, hematuria, or incontinence  NEUROLOGICAL: No headaches  SKIN: No itching,  no   rash  LYMPH Nodes: No enlarged glands  ENDOCRINE: No heat or cold intolerance  MUSCULOSKELETAL: No joint pain or swelling  PSYCHIATRIC: No depression, anxiety  HEME/LYMPH: No easy bruising, or bleeding gums  ALLERGY AND IMMUNOLOGIC: No hives or eczema	    MEDICATIONS  (STANDING):  atorvastatin 40 milliGRAM(s) Oral at bedtime  bisacodyl 5 milliGRAM(s) Oral every 12 hours  chlorhexidine 4% Liquid 1 Application(s) Topical <User Schedule>  heparin  Infusion.  Unit(s)/Hr (18 mL/Hr) IV Continuous <Continuous>  levothyroxine 137 MICROGram(s) Oral daily  metoprolol succinate  milliGRAM(s) Oral daily  midodrine. 10 milliGRAM(s) Oral three times a day  nystatin Powder 1 Application(s) Topical two times a day  senna 2 Tablet(s) Oral at bedtime    MEDICATIONS  (PRN):  acetaminophen     Tablet .. 650 milliGRAM(s) Oral every 6 hours PRN Mild Pain (1 - 3)  heparin   Injectable 8000 Unit(s) IV Push every 6 hours PRN For aPTT less than 40  heparin   Injectable 4000 Unit(s) IV Push every 6 hours PRN For aPTT between 40 - 57  oxyCODONE    IR 5 milliGRAM(s) Oral every 8 hours PRN Moderate Pain (4 - 6)  sodium chloride 0.9% lock flush 10 milliLiter(s) IV Push every 1 hour PRN Pre/post blood products, medications, blood draw, and to maintain line patency      Vital Signs Last 24 Hrs  T(C): 36.9 (24 Apr 2023 04:30), Max: 37.1 (23 Apr 2023 20:07)  T(F): 98.4 (24 Apr 2023 04:30), Max: 98.7 (23 Apr 2023 20:07)  HR: 99 (24 Apr 2023 04:30) (61 - 107)  BP: 111/67 (24 Apr 2023 04:30) (95/58 - 125/57)  BP(mean): --  RR: 18 (24 Apr 2023 04:30) (18 - 19)  SpO2: 96% (24 Apr 2023 04:30) (93% - 98%)    Parameters below as of 24 Apr 2023 04:30  Patient On (Oxygen Delivery Method): room air      CAPILLARY BLOOD GLUCOSE        I&O's Summary    23 Apr 2023 07:01  -  24 Apr 2023 07:00  --------------------------------------------------------  IN: 620 mL / OUT: 80 mL / NET: 540 mL          Appearance: Normal	  HEENT:   Normal oral mucosa, PERRL, EOMI	  Lymphatic: No lymphadenopathy  Cardiovascular: Normal S1 S2, No JVD  Respiratory: Lungs clear to auscultation	  Gastrointestinal:  Soft, Non-tender, + BS	  Skin: No rash, No ecchymoses	  Extremities:     LABS:                        7.1    12.61 )-----------( 215      ( 24 Apr 2023 07:03 )             22.4     04-24    130<L>  |  92<L>  |  54<H>  ----------------------------<  105<H>  4.8   |  25  |  5.84<H>    Ca    8.2<L>      24 Apr 2023 07:05      PTT - ( 24 Apr 2023 07:04 )  PTT:55.8 sec                        Consultant(s) Notes Reviewed:      Care Discussed with Consultants/Other Providers:

## 2023-04-25 LAB
ANION GAP SERPL CALC-SCNC: 12 MMOL/L — SIGNIFICANT CHANGE UP (ref 5–17)
APTT BLD: 27.4 SEC — LOW (ref 27.5–35.5)
APTT BLD: 46.2 SEC — HIGH (ref 27.5–35.5)
APTT BLD: 71.9 SEC — HIGH (ref 27.5–35.5)
BUN SERPL-MCNC: 36 MG/DL — HIGH (ref 7–23)
CALCIUM SERPL-MCNC: 7.9 MG/DL — LOW (ref 8.4–10.5)
CHLORIDE SERPL-SCNC: 96 MMOL/L — SIGNIFICANT CHANGE UP (ref 96–108)
CO2 SERPL-SCNC: 27 MMOL/L — SIGNIFICANT CHANGE UP (ref 22–31)
CREAT SERPL-MCNC: 4.43 MG/DL — HIGH (ref 0.5–1.3)
EGFR: 13 ML/MIN/1.73M2 — LOW
GLUCOSE SERPL-MCNC: 121 MG/DL — HIGH (ref 70–99)
HCT VFR BLD CALC: 22.5 % — LOW (ref 39–50)
HGB BLD-MCNC: 7.1 G/DL — LOW (ref 13–17)
MCHC RBC-ENTMCNC: 28.7 PG — SIGNIFICANT CHANGE UP (ref 27–34)
MCHC RBC-ENTMCNC: 31.6 GM/DL — LOW (ref 32–36)
MCV RBC AUTO: 91.1 FL — SIGNIFICANT CHANGE UP (ref 80–100)
NRBC # BLD: 0 /100 WBCS — SIGNIFICANT CHANGE UP (ref 0–0)
PLATELET # BLD AUTO: 241 K/UL — SIGNIFICANT CHANGE UP (ref 150–400)
POTASSIUM SERPL-MCNC: 4.1 MMOL/L — SIGNIFICANT CHANGE UP (ref 3.5–5.3)
POTASSIUM SERPL-SCNC: 4.1 MMOL/L — SIGNIFICANT CHANGE UP (ref 3.5–5.3)
RBC # BLD: 2.47 M/UL — LOW (ref 4.2–5.8)
RBC # FLD: 14.5 % — SIGNIFICANT CHANGE UP (ref 10.3–14.5)
SODIUM SERPL-SCNC: 135 MMOL/L — SIGNIFICANT CHANGE UP (ref 135–145)
VANCOMYCIN FLD-MCNC: 13.3 UG/ML — SIGNIFICANT CHANGE UP
WBC # BLD: 11.69 K/UL — HIGH (ref 3.8–10.5)
WBC # FLD AUTO: 11.69 K/UL — HIGH (ref 3.8–10.5)

## 2023-04-25 PROCEDURE — 76937 US GUIDE VASCULAR ACCESS: CPT | Mod: 26

## 2023-04-25 PROCEDURE — 36558 INSERT TUNNELED CV CATH: CPT

## 2023-04-25 PROCEDURE — 77001 FLUOROGUIDE FOR VEIN DEVICE: CPT | Mod: 26

## 2023-04-25 RX ORDER — HEPARIN SODIUM 5000 [USP'U]/ML
3200 INJECTION INTRAVENOUS; SUBCUTANEOUS
Qty: 25000 | Refills: 0 | Status: DISCONTINUED | OUTPATIENT
Start: 2023-04-25 | End: 2023-04-26

## 2023-04-25 RX ORDER — HEPARIN SODIUM 5000 [USP'U]/ML
2200 INJECTION INTRAVENOUS; SUBCUTANEOUS
Qty: 25000 | Refills: 0 | Status: DISCONTINUED | OUTPATIENT
Start: 2023-04-25 | End: 2023-04-25

## 2023-04-25 RX ORDER — VANCOMYCIN HCL 1 G
1000 VIAL (EA) INTRAVENOUS ONCE
Refills: 0 | Status: COMPLETED | OUTPATIENT
Start: 2023-04-25 | End: 2023-04-25

## 2023-04-25 RX ORDER — SODIUM CHLORIDE 9 MG/ML
1000 INJECTION, SOLUTION INTRAVENOUS
Refills: 0 | Status: DISCONTINUED | OUTPATIENT
Start: 2023-04-25 | End: 2023-04-25

## 2023-04-25 RX ADMIN — HEPARIN SODIUM 2800 UNIT(S)/HR: 5000 INJECTION INTRAVENOUS; SUBCUTANEOUS at 06:41

## 2023-04-25 RX ADMIN — CHLORHEXIDINE GLUCONATE 1 APPLICATION(S): 213 SOLUTION TOPICAL at 05:01

## 2023-04-25 RX ADMIN — HEPARIN SODIUM 2800 UNIT(S)/HR: 5000 INJECTION INTRAVENOUS; SUBCUTANEOUS at 07:26

## 2023-04-25 RX ADMIN — NYSTATIN CREAM 1 APPLICATION(S): 100000 CREAM TOPICAL at 05:04

## 2023-04-25 RX ADMIN — MIDODRINE HYDROCHLORIDE 10 MILLIGRAM(S): 2.5 TABLET ORAL at 13:55

## 2023-04-25 RX ADMIN — Medication 250 MILLIGRAM(S): at 17:50

## 2023-04-25 RX ADMIN — HEPARIN SODIUM 2800 UNIT(S)/HR: 5000 INJECTION INTRAVENOUS; SUBCUTANEOUS at 00:27

## 2023-04-25 RX ADMIN — Medication 137 MICROGRAM(S): at 05:06

## 2023-04-25 RX ADMIN — MIDODRINE HYDROCHLORIDE 10 MILLIGRAM(S): 2.5 TABLET ORAL at 05:06

## 2023-04-25 RX ADMIN — NYSTATIN CREAM 1 APPLICATION(S): 100000 CREAM TOPICAL at 17:50

## 2023-04-25 RX ADMIN — ATORVASTATIN CALCIUM 40 MILLIGRAM(S): 80 TABLET, FILM COATED ORAL at 21:15

## 2023-04-25 RX ADMIN — HEPARIN SODIUM 3200 UNIT(S)/HR: 5000 INJECTION INTRAVENOUS; SUBCUTANEOUS at 18:06

## 2023-04-25 RX ADMIN — MIDODRINE HYDROCHLORIDE 10 MILLIGRAM(S): 2.5 TABLET ORAL at 18:23

## 2023-04-25 RX ADMIN — HEPARIN SODIUM 3200 UNIT(S)/HR: 5000 INJECTION INTRAVENOUS; SUBCUTANEOUS at 20:53

## 2023-04-25 RX ADMIN — Medication 100 MILLIGRAM(S): at 05:04

## 2023-04-25 RX ADMIN — Medication 5 MILLIGRAM(S): at 05:07

## 2023-04-25 NOTE — PROCEDURE NOTE - PLAN
-Bed rest for 2 hours post procedure then back to floor if stable.
- 1 hour recovery  - Okay to restart hep gtt in 6 hours.

## 2023-04-25 NOTE — PRE PROCEDURE NOTE - PRE PROCEDURE EVALUATION
HPI: 77y Male with ESRD requiring dialysis s/p nontunneled dialysis catheter 4/10 presents for tunneled dialysis catheter placement.    Allergies: Myrbetriq (Hives)  tamsulosin (Hives)  levofloxacin (Hives)  alfuzosin (Hives)    Medications (Abx/Cardiac/Anticoagulation/Blood Products)    heparin  Infusion.: 2800 Unit(s)/Hr IV Continuous (04-25 @ 06:42)  metoprolol succinate ER: 100 milliGRAM(s) Oral (04-25 @ 05:04)  midodrine.: 10 milliGRAM(s) Oral (04-25 @ 05:06)    Data:    T(C): 36.5  HR: 76  BP: 107/69  RR: 20  SpO2: 99%    -WBC 11.69 / HgB 7.1 / Hct 22.5 / Plt 241  -Na 135 / Cl 96 / BUN 36 / Glucose 121  -K 4.1 / CO2 27 / Cr 4.43  -ALT -- / Alk Phos -- / T.Bili --    Imaging: Reviewed    Plan:   - Case discussed with Dr. Olmos from ID. No contraindications from his standpoint for placement of tunneled dialysis catheter given persistent low elevation of WBC count.   -77y Male presents for tunneled dialysis catheter placement.  -Risks/Benefits/alternatives explained with the patient and/or healthcare proxy and witnessed informed consent obtained.   
Pre-Interventional Radiology Procedure Note    Procedure: Renal parenchymal biopsy    Diagnosis/Indication: 77y Male with BEULAH, Differential also includes AIN perhaps in setting of Cefazolin? vs overdiuresis. IR consulted for random renal biopsy.    Interventional Radiology Attending Physician: Dr. Hutchins    PAST MEDICAL & SURGICAL HISTORY:  Afib  not on anticoagulation    CAD (coronary artery disease)    Varicose vein of leg    Hypothyroid    Pacemaker    H/O fracture of hip  2017    H/O asbestosis    HTN (hypertension)    Pacemaker  [employer] - Model RVDR01 1/10/2012    Stented coronary artery  drug eluding stent 5/2007    H/O detached retina repair  1952    S/P cataract surgery    History of hip surgery  left hip ORIF    H/O varicose vein stripping  1993 left leg    History of left knee replacement  2013 - multiple infections post op requiring reoperation in 2015, 2017, 2019)    H/O foot surgery  for infection of right foot 2019    H/O inguinal hernia repair  right 1993                        8.2    15.62 )-----------( 174      ( 14 Apr 2023 06:56 )             25.0     04-14    131<L>  |  92<L>  |  48<H>  ----------------------------<  116<H>  3.9   |  27  |  5.99<H>    Ca    8.2<L>      14 Apr 2023 06:55      PT/INR - ( 14 Apr 2023 06:55 )   PT: 15.4 sec;   INR: 1.34 ratio         PTT - ( 14 Apr 2023 06:55 )  PTT:29.0 sec
Interventional Radiology    HPI: 77y Male with PMH of Afib, CAD s/p CABG, BPH, HLD, HTN, and hypothyroidism who presented to the hospital on 3/25 with complaints of shortness of breath and weight gain. The patient had been nonadherent with his diuretic regimen as outpatient and presented with acute on chronic decompensated heart failure. The patient was initiated on IV diuresis with significant improvement in volume status with approximately 15lb weight loss. Hospital course was further complicated by MSSA bacteremia requiring pacemaker extraction with micra implant and currently being managed with IVC abx with PICC line in place. Course also c/b oliguric BEULAH suspected in setting of overdiuresis and volume depletion followed by ARF and uremia. Presents to IR for nontunneled hemodialysis catheter placement for HD.    Allergies: alfuzosin (Hives)  levofloxacin (Hives)  Myrbetriq (Hives)  tamsulosin (Hives)    Medications (Abx/Cardiac/Anticoagulation/Blood Products)    aspirin  chewable: 81 milliGRAM(s) Oral (04-09 @ 11:24)  buMETAnide Injectable: 2 milliGRAM(s) IV Push (04-10 @ 05:39)  heparin  Infusion: 14.5 mL/Hr IV Continuous (04-09 @ 05:45)  heparin  Infusion: 16 mL/Hr IV Continuous (04-10 @ 00:05)  metoprolol succinate ER: 100 milliGRAM(s) Oral (04-10 @ 05:38)    Data:    T(C): 36.9  HR: 75  BP: 100/62  RR: 18  SpO2: 92%    Exam  General: No acute distress  Chest: Non labored breathing    -WBC 8.93 / HgB 8.5 / Hct 25.4 / Plt 269  -Na 128 / Cl 86 /  / Glucose 100  -K 5.2 / CO2 24 / Cr 9.66  -ALT -- / Alk Phos -- / T.Bili --    Imaging: Reviewed    Plan: 77y Male presents for nontunneled hemodialysis catheter placement.   -Risks/Benefits/alternatives explained with the patient and/or healthcare proxy and witnessed informed consent obtained.

## 2023-04-25 NOTE — PROGRESS NOTE ADULT - SUBJECTIVE AND OBJECTIVE BOX
Date of Service: 04-25-23 @ 07:38           CARDIOLOGY     PROGRESS  NOTE   ________________________________________________    CHIEF COMPLAINT:Patient is a 77y old  Male who presents with a chief complaint of Chart Reviewed, Events noted  " 77  year old male h/o  CAD s/p stent , asa. A-fib/ PPM, , hypothyroid, and total left knee replacement  prior  PPM  interrogation  with  WCT/   s/p  brief   bursts  of  afib/ , on prior visit. Ct chest, retrosternal hematoma.  mod left pl effusion was  on bumex/  aldactone admitted with  worsening   pedal  edema  component  of  cellulitis   and  from  c/c  diastolic  chf,/  h/o  l/edema  of L  leg pt  admits to  being  non complaint with  meds"   no complain    	  REVIEW OF SYSTEMS:  CONSTITUTIONAL: No fever, weight loss, or fatigue  EYES: No eye pain, visual disturbances, or discharge  ENT:  No difficulty hearing, tinnitus, vertigo; No sinus or throat pain  NECK: No pain or stiffness  RESPIRATORY: No cough, wheezing, chills or hemoptysis; No Shortness of Breath  CARDIOVASCULAR: No chest pain, palpitations, passing out, dizziness, or leg swelling  GASTROINTESTINAL: No abdominal or epigastric pain. No nausea, vomiting, or hematemesis; No diarrhea or constipation. No melena or hematochezia.  GENITOURINARY: No dysuria, frequency, hematuria, or incontinence  NEUROLOGICAL: No headaches, memory loss, loss of strength, numbness, or tremors  SKIN: No itching, burning, rashes, or lesions   LYMPH Nodes: No enlarged glands  ENDOCRINE: No heat or cold intolerance; No hair loss  MUSCULOSKELETAL: No joint pain or swelling; No muscle, back, or extremity pain  PSYCHIATRIC: No depression, anxiety, mood swings, or difficulty sleeping  HEME/LYMPH: No easy bruising, or bleeding gums  ALLERGY AND IMMUNOLOGIC: No hives or eczema	    [x ] All others negative	  [ ] Unable to obtain    PHYSICAL EXAM:  T(C): 36.4 (04-25-23 @ 04:21), Max: 36.9 (04-24-23 @ 12:23)  HR: 76 (04-25-23 @ 04:21) (76 - 97)  BP: 107/66 (04-25-23 @ 04:21) (92/51 - 108/58)  RR: 18 (04-25-23 @ 04:21) (18 - 18)  SpO2: 93% (04-25-23 @ 04:21) (92% - 94%)  Wt(kg): --  I&O's Summary    24 Apr 2023 07:01  -  25 Apr 2023 07:00  --------------------------------------------------------  IN: 1292 mL / OUT: 1670 mL / NET: -378 mL        Appearance: Normal	  HEENT:   Normal oral mucosa, PERRL, EOMI	  Lymphatic: No lymphadenopathy  Cardiovascular: Normal S1 S2, No JVD, + murmurs, No edema  Respiratory: rhonchi  Psychiatry: A & O x 3, Mood & affect appropriate  Gastrointestinal:  Soft, Non-tender, + BS	  Skin: No rashes, No ecchymoses, No cyanosis	  Neurologic: Non-focal  Extremities: Normal range of motion, No clubbing, cyanosis or edema  Vascular: Peripheral pulses palpable 2+ bilaterally    MEDICATIONS  (STANDING):  atorvastatin 40 milliGRAM(s) Oral at bedtime  bisacodyl 5 milliGRAM(s) Oral every 12 hours  chlorhexidine 4% Liquid 1 Application(s) Topical <User Schedule>  heparin  Infusion.  Unit(s)/Hr (18 mL/Hr) IV Continuous <Continuous>  levothyroxine 137 MICROGram(s) Oral daily  metoprolol succinate  milliGRAM(s) Oral daily  midodrine. 10 milliGRAM(s) Oral three times a day  nystatin Powder 1 Application(s) Topical two times a day  senna 2 Tablet(s) Oral at bedtime      TELEMETRY: 	    ECG:  	  RADIOLOGY:  OTHER: 	  	  LABS:	 	    CARDIAC MARKERS:                                7.1    11.69 )-----------( 241      ( 25 Apr 2023 06:08 )             22.5     04-25    135  |  96  |  36<H>  ----------------------------<  121<H>  4.1   |  27  |  4.43<H>    Ca    7.9<L>      25 Apr 2023 06:09      proBNP:   Lipid Profile:   HgA1c:   TSH:   PTT - ( 25 Apr 2023 06:09 )  PTT:71.9 sec      Assessment and plan  ---------------------------  77y m pmh BPH, Chronic venous insufficiency, HLD Hypothyroidism, S/P CABG x 2, S/P mitral valve replacement, SP Status post angioplasty, Sp TKR rt, Anasarca Pt comes to ed c/o shortness of breath for past month w progressive worsening of anasarca w swelling of scrotum and diff urinating, Has been noncompliant on diuretics for past two weeks w 20lb weight gain over past month. No fever and chills, sputum, nvdc, cp. PE Adult male lying stretcher w maked swelling to josh lower extr, w pitting edema,  chest  scant josh crackles w slight wheeze and prolonged exp phase  chf acute on chronic sec to RV dysfunction  tele, AFib hr controlled  will adjust cardiac meds  +BC i bottle, MSSA , s/p ppm explant  s/p PPM and LEAD extraction  oob too chair  PICC line needs 6 weeks of abx  acute on chronic renal failure/ renal appreciated  acute renal failure/ hematuria/ac was held awaiting renal eval/urology called awaiting ct abdomen and pelvis has ordered since last night awaiting results  beta blocker sed to rate control and hx of cad, s/p CABG  no further hematuria, start iv heparin low dose observe closely so far so good  on HD ?need of bumex  vanco level noted  cardiomyopathy can not tolerate ACE/ARB  a.fib hr is well controlled  hematuria, ac is held urology called, fu cbc closely discussed with ACP mat re start if hematuria has resolved  no further hematuria started on ac keep ptt 50 to 70  awaiting pathology  will add midodrine specially if needs hd  may consider to re start on Eliquis, will hold for now consider dc planning as out pt HD, dc Bumex  dc planning as pt is scheduled for out pt HD/ deisiley dialysis cathete rstart on midodrine 10 mg tid awaiting pathology  on midodrine 10 mg tid  awaiting pathology  dc planning in progress to rehab, spoke to his girlfriend

## 2023-04-25 NOTE — PROGRESS NOTE ADULT - ASSESSMENT
77  year old male    h/o  CAD s/p stent , asa.      A-fib/ PPM, , hypothyroid, and total left knee replacement    prior  PPM  interrogation  with  [facility]/   s/p  brief   bursts  of  afib/ , on prior visit   Ct chest, retrosternal hematoma.  mod left pl effusion    was  on bumex/  aldactone        *    admitted   for  worsening   pedal  edema          component  of  cellulitis   and  from  c/c  diastolic  chf,/  h/o  l/edema  of L  leg         pt  admits to  being  non complaint with  meds        *   h/o    c/c AFIB,   has  PPM         on eliquis     *    Anemia, , hb stable, had  been seen by  gi  eval dr joann sanchez in past     *    CAD,     cath, with 2 vessel disease,  s/p  CABG and  MVR  surg d r marni   *      h/o  Afib on    eliquis          c/c leg redness/  4 +  edema, on keflex, has  improved    *     echo,  on 7/2022,  ef  35/ new/ severe  TR          cath,   was non  obstructive    *    on synthroid          on asa/  torpol/  eliquis          crt  noted,   from  lasix// aldactone.  leg  swelling  is  lessening   *   MSSA  bacterinia           was  on iv ancef.  ,  rpt bcx    are  negative          explant pf  ppm  and  Micra  placement on  3/30/23. dr sunitha rocha   *    Echo,  ef  25,  mod  AI.  severe  TR        PICC  line  and  extended  course  of  ab.         BEULAH,  /   ?  AIN,  hence   ab was  switched   to iv vanco,  per  ID         Afib. on iv heparin         crt   was  9,9, now  on HD            s/p  renal bx on 4/ 14.       still awaiting  path report/       needs  Permcath  and then,  d/c planning to rehab/ pt very eager to leave     after Permcath placed, then will  need  to  re start eliquis         card /  dr mikayla dick         rad< from: TTE with Doppler (w/Cont) (07.05.22 @ 09:42) >  Conclusions:  Endocardial visualization enhanced with intravenous  injection of Ultrasonic Enhancing Agent (Definity).  Severe left ventricular enlargement.  Estimated ejection fraction 35%. Diffuse hypokinesis, with  inferior akinesis.  Bioprosthetic mitral valve with normal function.  Right ventricular enlargement with decreased right  ventricular systolic function.  A device wire is noted in the right heart.  ------------------------------------------------------------------------  Confirmed on  7/5/2022 - 12:16:10 by Kristian    < end of copied text >

## 2023-04-25 NOTE — PROGRESS NOTE ADULT - SUBJECTIVE AND OBJECTIVE BOX
date of service: 04-25-23 @ 09:22  afberile  REVIEW OF SYSTEMS:  CONSTITUTIONAL: No fever,  no  weight loss  ENT:  No  tinnitus,   no   vertigo  NECK: No pain or stiffness  RESPIRATORY: No cough, wheezing, chills or hemoptysis;    No Shortness of Breath  CARDIOVASCULAR: No chest pain, palpitations, dizziness  GASTROINTESTINAL: No abdominal or epigastric pain. No nausea, vomiting, or hematemesis; No diarrhea  No melena or hematochezia.  GENITOURINARY: No dysuria, frequency, hematuria, or incontinence  NEUROLOGICAL: No headaches  SKIN: No itching,  no   rash  LYMPH Nodes: No enlarged glands  ENDOCRINE: No heat or cold intolerance  MUSCULOSKELETAL: No joint pain or swelling  PSYCHIATRIC: No depression, anxiety  HEME/LYMPH: No easy bruising, or bleeding gums  ALLERGY AND IMMUNOLOGIC: No hives or eczema	    MEDICATIONS  (STANDING):  atorvastatin 40 milliGRAM(s) Oral at bedtime  bisacodyl 5 milliGRAM(s) Oral every 12 hours  chlorhexidine 4% Liquid 1 Application(s) Topical <User Schedule>  heparin  Infusion.  Unit(s)/Hr (18 mL/Hr) IV Continuous <Continuous>  levothyroxine 137 MICROGram(s) Oral daily  metoprolol succinate  milliGRAM(s) Oral daily  midodrine. 10 milliGRAM(s) Oral three times a day  nystatin Powder 1 Application(s) Topical two times a day  senna 2 Tablet(s) Oral at bedtime    MEDICATIONS  (PRN):  acetaminophen     Tablet .. 650 milliGRAM(s) Oral every 6 hours PRN Mild Pain (1 - 3)  heparin   Injectable 4000 Unit(s) IV Push every 6 hours PRN For aPTT between 40 - 57  heparin   Injectable 8000 Unit(s) IV Push every 6 hours PRN For aPTT less than 40  oxyCODONE    IR 5 milliGRAM(s) Oral every 8 hours PRN Moderate Pain (4 - 6)  sodium chloride 0.9% lock flush 10 milliLiter(s) IV Push every 1 hour PRN Pre/post blood products, medications, blood draw, and to maintain line patency      Vital Signs Last 24 Hrs  T(C): 36.4 (25 Apr 2023 04:21), Max: 36.9 (24 Apr 2023 12:23)  T(F): 97.5 (25 Apr 2023 04:21), Max: 98.4 (24 Apr 2023 12:23)  HR: 76 (25 Apr 2023 04:21) (76 - 97)  BP: 107/66 (25 Apr 2023 04:21) (92/51 - 108/58)  BP(mean): --  RR: 18 (25 Apr 2023 04:21) (18 - 18)  SpO2: 93% (25 Apr 2023 04:21) (92% - 94%)    Parameters below as of 25 Apr 2023 04:21  Patient On (Oxygen Delivery Method): room air      CAPILLARY BLOOD GLUCOSE        I&O's Summary    24 Apr 2023 07:01  -  25 Apr 2023 07:00  --------------------------------------------------------  IN: 1292 mL / OUT: 1670 mL / NET: -378 mL          Appearance: Normal	  HEENT:   Normal oral mucosa, PERRL, EOMI	  Lymphatic: No lymphadenopathy  Cardiovascular: Normal S1 S2, No JVD  Respiratory: Lungs clear to auscultation	  Gastrointestinal:  Soft, Non-tender, + BS	  Skin: No rash, No ecchymoses	  Extremities:     LABS:                        7.1    11.69 )-----------( 241      ( 25 Apr 2023 06:08 )             22.5     04-25    135  |  96  |  36<H>  ----------------------------<  121<H>  4.1   |  27  |  4.43<H>    Ca    7.9<L>      25 Apr 2023 06:09      PTT - ( 25 Apr 2023 06:09 )  PTT:71.9 sec                        Consultant(s) Notes Reviewed:      Care Discussed with Consultants/Other Providers:

## 2023-04-25 NOTE — PROCEDURE NOTE - GENERAL PROCEDURE NAME
Tunneled dialysis catheter placement and non-tunneled dialysis catheter removal
US guided left renal parenchymal biopsy

## 2023-04-25 NOTE — PROCEDURE NOTE - NSINFORMCONSENT_GEN_A_CORE
Benefits, risks, and possible complications of procedure explained to patient/caregiver who verbalized understanding and gave written consent.
Benefits, risks, and possible complications of procedure explained to patient/caregiver who verbalized understanding and gave written consent.
Benefits, risks, and possible complications of procedure explained to patient/caregiver who verbalized understanding and gave verbal consent.
Benefits, risks, and possible complications of procedure explained to patient/caregiver who verbalized understanding and gave written consent.

## 2023-04-25 NOTE — PROCEDURE NOTE - PROCEDURE FINDINGS AND DETAILS
- Successful placement of 14.5fr x 19cm tunneled RIJ dialysis catheter and removal of non-tunneled dialysis catheter.  - Full report to follow.
Successful imaging guided left renal parenchymal 18G core needle biopsy x4. Tract hemostasis achieved using gelfoam.

## 2023-04-26 DIAGNOSIS — R19.7 DIARRHEA, UNSPECIFIED: ICD-10-CM

## 2023-04-26 LAB
APTT BLD: 130.6 SEC — CRITICAL HIGH (ref 27.5–35.5)
APTT BLD: 67.3 SEC — HIGH (ref 27.5–35.5)
HCT VFR BLD CALC: 23 % — LOW (ref 39–50)
HGB BLD-MCNC: 7.2 G/DL — LOW (ref 13–17)
MCHC RBC-ENTMCNC: 28.5 PG — SIGNIFICANT CHANGE UP (ref 27–34)
MCHC RBC-ENTMCNC: 31.3 GM/DL — LOW (ref 32–36)
MCV RBC AUTO: 90.9 FL — SIGNIFICANT CHANGE UP (ref 80–100)
NRBC # BLD: 0 /100 WBCS — SIGNIFICANT CHANGE UP (ref 0–0)
PLATELET # BLD AUTO: 244 K/UL — SIGNIFICANT CHANGE UP (ref 150–400)
RBC # BLD: 2.53 M/UL — LOW (ref 4.2–5.8)
RBC # FLD: 14.6 % — HIGH (ref 10.3–14.5)
WBC # BLD: 9.76 K/UL — SIGNIFICANT CHANGE UP (ref 3.8–10.5)
WBC # FLD AUTO: 9.76 K/UL — SIGNIFICANT CHANGE UP (ref 3.8–10.5)

## 2023-04-26 PROCEDURE — 99232 SBSQ HOSP IP/OBS MODERATE 35: CPT | Mod: GC

## 2023-04-26 PROCEDURE — 99232 SBSQ HOSP IP/OBS MODERATE 35: CPT

## 2023-04-26 RX ORDER — APIXABAN 2.5 MG/1
5 TABLET, FILM COATED ORAL
Refills: 0 | Status: DISCONTINUED | OUTPATIENT
Start: 2023-04-26 | End: 2023-05-01

## 2023-04-26 RX ADMIN — NYSTATIN CREAM 1 APPLICATION(S): 100000 CREAM TOPICAL at 05:22

## 2023-04-26 RX ADMIN — Medication 137 MICROGRAM(S): at 05:20

## 2023-04-26 RX ADMIN — APIXABAN 5 MILLIGRAM(S): 2.5 TABLET, FILM COATED ORAL at 12:16

## 2023-04-26 RX ADMIN — Medication 100 MILLIGRAM(S): at 05:21

## 2023-04-26 RX ADMIN — NYSTATIN CREAM 1 APPLICATION(S): 100000 CREAM TOPICAL at 17:39

## 2023-04-26 RX ADMIN — HEPARIN SODIUM 0 UNIT(S)/HR: 5000 INJECTION INTRAVENOUS; SUBCUTANEOUS at 09:31

## 2023-04-26 RX ADMIN — MIDODRINE HYDROCHLORIDE 10 MILLIGRAM(S): 2.5 TABLET ORAL at 17:39

## 2023-04-26 RX ADMIN — HEPARIN SODIUM 3200 UNIT(S)/HR: 5000 INJECTION INTRAVENOUS; SUBCUTANEOUS at 02:00

## 2023-04-26 RX ADMIN — ATORVASTATIN CALCIUM 40 MILLIGRAM(S): 80 TABLET, FILM COATED ORAL at 22:07

## 2023-04-26 RX ADMIN — MIDODRINE HYDROCHLORIDE 10 MILLIGRAM(S): 2.5 TABLET ORAL at 12:16

## 2023-04-26 RX ADMIN — CHLORHEXIDINE GLUCONATE 1 APPLICATION(S): 213 SOLUTION TOPICAL at 05:19

## 2023-04-26 RX ADMIN — MIDODRINE HYDROCHLORIDE 10 MILLIGRAM(S): 2.5 TABLET ORAL at 05:21

## 2023-04-26 NOTE — PROVIDER CONTACT NOTE (CRITICAL VALUE NOTIFICATION) - BACKGROUND
pt admitted for SOB
Pt comes to ed c/o shortness of breath for past month w progressive worsening of anasarca w swelling of scrotum and diff urinating, Has been noncompliant on diuretics for past two weeks w 20lb weight gain over past month

## 2023-04-26 NOTE — PROVIDER CONTACT NOTE (CRITICAL VALUE NOTIFICATION) - ACTION/TREATMENT ORDERED:
Repeat blood cultures
Following heparin gtt nomogram protocol. NP notified and aware, follow heparin gtt nomogram protocol

## 2023-04-26 NOTE — PROGRESS NOTE ADULT - ADDITIONAL PE
right CW HD catheter -no redness/swelling/tenderness    right UE PICC -no redness/swelling/tenderness
right IJ HD catheter
right UE PICC-no redness/swelling/tenderness
right UE PICC-no redness/swelling/tenderness    right IJ HD catheter -no redness/swelling/tenderness
right IJ HD catheter-no redness/swelling/tenderness

## 2023-04-26 NOTE — PROGRESS NOTE ADULT - CONSTITUTIONAL
no distress/obese

## 2023-04-26 NOTE — PROGRESS NOTE ADULT - LYMPHATIC SYMPTOMS
swelling of extremity

## 2023-04-26 NOTE — PROGRESS NOTE ADULT - PROBLEM SELECTOR PLAN 2
Pt. with hyperkalemia in setting of BEULAH. Was persistently elevated; now started on HD. Most recent serum potassium WNL. Lokelma stopped. Plan for HD as mentioned above. Monitor serum potassium.     If any questions, please feel free to contact me     Darrin Mullins  Nephrology Fellow  [facility] Pager: [phone].
Pt. with hyperkalemia in setting of BEULAH. Was persistently elevated; now started on HD. Most recent serum potassium WNL. Recommend to stop Lokelma. Plan for HD as mentioned above. Monitor serum potassium.     If any questions, please feel free to contact me     Darrin Mullins  Nephrology Fellow  [facility] Pager: [phone].
Pt. with hyperkalemia in setting of BEULAH, thus far medically managed with lokelma. Most recent Serum potassium 5.5. Currently receiving Lokelma 10 gm qd. Recommend to increase Lokelma to 10 gm BID. Monitor serum potassium.     If you have any questions, please feel free to contact me  Kylee Rodrigues  Nephrology Fellow  [phone] / [employer] Teams(Preferred)  (After 5pm or on weekends please page the on-call fellow)
Pt. with hyperkalemia in setting of BEULAH. Current on HD. Most recent serum potassium WNL. Lokelma stopped. Plan for HD as mentioned above. Monitor serum potassium.     If you have any questions, please feel free to contact me  Santos William  Nephrology Fellow  [phone]/ [employer] Teams(Preferred)  (After 5pm or on weekends please page the on-call fellow).
Pt. with hyperkalemia in setting of BEULAH. Was persistently elevated; now started on HD. Most recent serum potassium WNL. Lokelma stopped. Plan for HD as mentioned above. Monitor serum potassium.     If any questions, please feel free to contact me     Darrin Mullins  Nephrology Fellow  [facility] Pager: [phone].
Pt. with hyperkalemia in setting of BEULAH, thus far medically managed with lokelma. Most recent Serum potassium 4.9. Monitor serum potassium.     If any questions, please feel free to contact me     Darrin Mullins  Nephrology Fellow  [facility] Pager: [phone].
Pt. with hyperkalemia in setting of BEULAH, thus far medically managed with lokelma. Most recent Serum potassium 5.5. Currently receiving Lokelma 10 gm BID. Plan for HD as mentioned above. Monitor serum potassium.     If any questions, please feel free to contact me     Darrin Mullins  Nephrology Fellow  [facility] Pager: [phone]
Pt. with hyperkalemia in setting of BEULAH. Current on HD. Most recent serum potassium WNL. Lokelma stopped. Plan for HD as mentioned above. Monitor serum potassium.     If you have any questions, please feel free to contact me  Santos William  Nephrology Fellow  [phone]/ [employer] Teams(Preferred)  (After 5pm or on weekends please page the on-call fellow)
Pt. with hyperkalemia in setting of BEULAH. Current on HD. Most recent serum potassium WNL. Lokelma stopped. Plan for HD as mentioned above. Monitor serum potassium.     If you have any questions, please feel free to contact me  Santos William  Nephrology Fellow  [phone]/ [employer] Teams(Preferred)  (After 5pm or on weekends please page the on-call fellow).
Pt. with hyperkalemia in setting of BEULAH, thus far medically managed with lokelma. Most recent Serum potassium 5.2. Continue with lokelma 10g daily. Monitor serum potassium.     If any questions, please feel free to contact me     Darrin Mullins  Nephrology Fellow  [facility] Pager: [phone]
Pt. with hyperkalemia in setting of BEULAH, thus far medically managed with lokelma. Most recent Serum potassium 5.2. Continue with lokelma 10g daily. Monitor serum potassium.     If you have any questions, please feel free to contact me  Kylee Rodrigues  Nephrology Fellow  [phone] / [employer] Teams(Preferred)  (After 5pm or on weekends please page the on-call fellow)
-per cardio
Pt. with hyperkalemia in setting of BEULAH. Current on HD. Most recent serum potassium WNL. Lokelma stopped. Plan for HD as mentioned above. Monitor serum potassium.     If you have any questions, please feel free to contact me  Santos William  Nephrology Fellow  [phone]/ [employer] Teams(Preferred)  (After 5pm or on weekends please page the on-call fellow)
-per cardio

## 2023-04-26 NOTE — PROGRESS NOTE ADULT - SUBJECTIVE AND OBJECTIVE BOX
MARIELOS JANEE 77y [id number]    Patient is a 77y old  Male who presents with a chief complaint of Chart Reviewed, Events noted  " 77  year old male h/o  CAD s/p stent , asa. A-fib/ PPM, , hypothyroid, and total left knee replacement  prior  PPM  interrogation  with  WCT/   s/p  brief   bursts  of  afib/ , on prior visit. Ct chest, retrosternal hematoma.  mod left pl effusion was  on bumex/  aldactone admitted with  worsening   pedal  edema  component  of  cellulitis   and  from  c/c  diastolic  chf,/  h/o  l/edema  of L  leg pt  admits to  being  non complaint with  meds"           (05 Apr 2023 11:57)      Follow Up/CC:  ID following for bacteremia    Interval History/ROS: no fever, s/p HD catheter, c/o diarrhea    Allergies    Myrbetriq (Hives)  tamsulosin (Hives)  levofloxacin (Hives)  alfuzosin (Hives)    Intolerances        ANTIMICROBIALS:      MEDICATIONS  (STANDING):  atorvastatin 40 milliGRAM(s) Oral at bedtime  bisacodyl 5 milliGRAM(s) Oral every 12 hours  chlorhexidine 4% Liquid 1 Application(s) Topical <User Schedule>  heparin  Infusion. 3200 Unit(s)/Hr (32 mL/Hr) IV Continuous <Continuous>  levothyroxine 137 MICROGram(s) Oral daily  metoprolol succinate  milliGRAM(s) Oral daily  midodrine. 10 milliGRAM(s) Oral three times a day  nystatin Powder 1 Application(s) Topical two times a day  senna 2 Tablet(s) Oral at bedtime    MEDICATIONS  (PRN):  acetaminophen     Tablet .. 650 milliGRAM(s) Oral every 6 hours PRN Mild Pain (1 - 3)  heparin   Injectable 8000 Unit(s) IV Push every 6 hours PRN For aPTT less than 40  heparin   Injectable 4000 Unit(s) IV Push every 6 hours PRN For aPTT between 40 - 57  oxyCODONE    IR 5 milliGRAM(s) Oral every 8 hours PRN Moderate Pain (4 - 6)  sodium chloride 0.9% lock flush 10 milliLiter(s) IV Push every 1 hour PRN Pre/post blood products, medications, blood draw, and to maintain line patency        Vital Signs Last 24 Hrs  T(C): 36.8 (26 Apr 2023 04:38), Max: 36.9 (25 Apr 2023 17:37)  T(F): 98.3 (26 Apr 2023 04:38), Max: 98.5 (25 Apr 2023 21:09)  HR: 70 (26 Apr 2023 04:38) (67 - 90)  BP: 101/60 (26 Apr 2023 04:38) (85/51 - 117/59)  BP(mean): 83 (25 Apr 2023 13:00) (63 - 83)  RR: 18 (26 Apr 2023 04:38) (15 - 20)  SpO2: 94% (26 Apr 2023 04:38) (91% - 100%)    Parameters below as of 26 Apr 2023 04:38  Patient On (Oxygen Delivery Method): room air        CBC Full  -  ( 26 Apr 2023 07:41 )  WBC Count : 9.76 K/uL  RBC Count : 2.53 M/uL  Hemoglobin : 7.2 g/dL  Hematocrit : 23.0 %  Platelet Count - Automated : 244 K/uL  Mean Cell Volume : 90.9 fl  Mean Cell Hemoglobin : 28.5 pg  Mean Cell Hemoglobin Concentration : 31.3 gm/dL  Auto Neutrophil # : x  Auto Lymphocyte # : x  Auto Monocyte # : x  Auto Eosinophil # : x  Auto Basophil # : x  Auto Neutrophil % : x  Auto Lymphocyte % : x  Auto Monocyte % : x  Auto Eosinophil % : x  Auto Basophil % : x    04-25    135  |  96  |  36<H>  ----------------------------<  121<H>  4.1   |  27  |  4.43<H>    Ca    7.9<L>      25 Apr 2023 06:09            MICROBIOLOGY:  Clean Catch Clean Catch (Midstream)  04-15-23   No growth  --  --      Clean Catch Clean Catch (Midstream)  04-04-23   No growth  --  --      .Other Other  03-30-23   No fungus isolated at 3 weeks.  --  --      .Blood Blood-Peripheral  03-28-23   No Growth Final  --  --      RADIOLOGY

## 2023-04-26 NOTE — PROGRESS NOTE ADULT - NSPROGADDITIONALINFOA_GEN_ALL_CORE
I am away from 4/7 and will return 4/13. Call ID office @ [phone] with questions.
Please call the ID service [phone] with questions or concerns over the weekend.
I am away and will return on 5/1. Call ID office @ [phone] with questions.
D/w ACP
I am away and will return on 4/24. Call ID office @ [phone] with questions.
Please call the ID service [phone] with questions or concerns over the weekend.

## 2023-04-26 NOTE — PROGRESS NOTE ADULT - PROBLEM SELECTOR PROBLEM 2
Hyperkalemia
Heart failure

## 2023-04-26 NOTE — PROGRESS NOTE ADULT - NEGATIVE RESPIRATORY AND THORAX SYMPTOMS
no cough
no dyspnea/no cough
no cough
no dyspnea/no cough
no cough
no dyspnea/no cough
no cough
no dyspnea/no cough
no cough
no dyspnea/no cough

## 2023-04-26 NOTE — PROGRESS NOTE ADULT - NEGATIVE OPHTHALMOLOGIC SYMPTOMS
no pain L/no pain R

## 2023-04-26 NOTE — PROGRESS NOTE ADULT - NOSE
no discharge

## 2023-04-26 NOTE — PROGRESS NOTE ADULT - PROBLEM SELECTOR PLAN 1
Pt. with oliguric BEULAH. Differential also includes AIN perhaps in setting of Cefazolin? vs overdiuresis. On review of [facility] pt noted to have a Scr of 1 on admission which since 4/1 has progressively increased to 7 most recently. UA with blood and also proteinuria of 1.0g.       Serological work up negative except JAGRUTI positive with homogenous pattern. Renal sonogram demonstrating a horseshoe kidney without evidence of hydronephrosis or renal calculi. Pt underwent kidney biopsy by IR on 4/14. Kidney biopsy results showed primarily interstitial disease likely pyelonephritis.       Pt underwent first session HD on 4/10 via RIJ non tunneled HD catheter. RIJ tunneled HD catheter was placed by IR on 4/25. Last HD done on 4/24. Plan for HD again today. Pt with no signs of renal recovery for now, pt will need long term dialysis for now.     Send iron studies (orders placed). Send PTH and vitamin D levels (orders placed). Monitor labs and urine output. Avoid nephrotoxins. Dose medications as per eGFR.    If you have any questions, please feel free to contact me  Santos William  Nephrology Fellow  [phone]/ [employer] Teams(Preferred)  (After 5pm or on weekends please page the on-call fellow).

## 2023-04-26 NOTE — PROGRESS NOTE ADULT - ASSESSMENT
77  year old male    h/o  CAD s/p stent , asa.      A-fib/ PPM, , hypothyroid, and total left knee replacement    prior  PPM  interrogation  with  [facility]/   s/p  brief   bursts  of  afib/ , on prior visit   Ct chest, retrosternal hematoma.  mod left pl effusion    was  on bumex/  aldactone        *    admitted   for  worsening   pedal  edema          component  of  cellulitis   and  from  c/c  diastolic  chf,/  h/o  l/edema  of L  leg         pt  admits to  being  non complaint with  meds        *   h/o    c/c AFIB,   has  PPM         on eliquis     *    Anemia, , hb stable, had  been seen by  gi  eval dr joann sanchez in past     *    CAD,     cath, with 2 vessel disease,  s/p  CABG and  MVR  surg d r marni   *      h/o  Afib on    eliquis          c/c leg redness/  4 +  edema, on keflex, has  improved    *     echo,  on 7/2022,  ef  35/ new/ severe  TR          cath,   was non  obstructive    *    on synthroid          on asa/  torpol/  eliquis          crt  noted,   from  lasix// aldactone.  leg  swelling  is  lessening   *   MSSA  bacterinia           was  on iv ancef.  ,  rpt bcx    are  negative          explant pf  ppm  and  Micra  placement on  3/30/23. dr sunitha rocha   *    Echo,  ef  25,  mod  AI.  severe  TR        PICC  line  and  extended  course  of  ab.         BEULAH,  /   ?  AIN,  hence   ab was  switched   to iv vanco,  per  ID         Afib. on iv heparin         crt   was  9,9, now  on HD            s/p  renal bx on 4/ 14.       still awaiting  path report/       Permcath   placedm    need  to  re start eliquis/  proceed with  d/c  planning         card /  dr mikayla dick         rad< from: TTE with Doppler (w/Cont) (07.05.22 @ 09:42) >  Conclusions:  Endocardial visualization enhanced with intravenous  injection of Ultrasonic Enhancing Agent (Definity).  Severe left ventricular enlargement.  Estimated ejection fraction 35%. Diffuse hypokinesis, with  inferior akinesis.  Bioprosthetic mitral valve with normal function.  Right ventricular enlargement with decreased right  ventricular systolic function.  A device wire is noted in the right heart.  ------------------------------------------------------------------------  Confirmed on  7/5/2022 - 12:16:10 by Kristian    < end of copied text >

## 2023-04-26 NOTE — PROGRESS NOTE ADULT - NEGATIVE SKIN SYMPTOMS
no rash/no itching

## 2023-04-26 NOTE — PROVIDER CONTACT NOTE (CRITICAL VALUE NOTIFICATION) - ASSESSMENT
Currently on cefazolin IVPB, afebrile 98.4 F.
pt is a+ox4, pt denies s.s of bleeding, denies discomfort

## 2023-04-26 NOTE — PROGRESS NOTE ADULT - NEGATIVE ENMT SYMPTOMS
no ear pain/no nasal congestion/no throat pain

## 2023-04-26 NOTE — PROGRESS NOTE ADULT - SUBJECTIVE AND OBJECTIVE BOX
[facility] DIVISION OF KIDNEY DISEASES AND HYPERTENSION -- FOLLOW UP NOTE  --------------------------------------------------------------------------------  HPI: 77 year old male with PMH of Afib, CAD s/p CABG, BPH, HLD, HTN, and hypothyroidism who presented to the hospital on 3/25 with complaints of shortness of breath and wiley gain. The patient had been nonadherent with his diuretic regimen as outpatient and presented with acute on chronic decompensated heart failure. The patient was initiated on IV diuresis with significant improvement in volume status with approximately 15lb weight loss. Hospital course was further complicated by MSSA bacteremia requiring pacemaker extraction with micra implant and currently being managed with IVC abx with PICC line in place. The nephrology team was consulted for oliguric BEULAH. [facility] noted to have a SCr of 1 on admission which since 4/1 has progressively increased to 9.66 initiated on HD 4/10. Last HD done on 4/21. Underwent kidney biopsy on 4/14- suggestive of interstitial disease likely pyelonephritis.     24 hour events/subjective:  Pt. seen and examined this morning. He endorse diarrhea since last night. Denies any headaches, fevers/chills, chest pain, and abdominal pain. No fever.     PAST HISTORY  --------------------------------------------------------------------------------  No significant changes to PMH, PSH, FHx, SHx, unless otherwise noted    ALLERGIES & MEDICATIONS  --------------------------------------------------------------------------------  Allergies    Myrbetriq (Hives)  tamsulosin (Hives)  levofloxacin (Hives)  alfuzosin (Hives)    Intolerances    Standing Inpatient Medications  apixaban 5 milliGRAM(s) Oral two times a day  atorvastatin 40 milliGRAM(s) Oral at bedtime  bisacodyl 5 milliGRAM(s) Oral every 12 hours  chlorhexidine 4% Liquid 1 Application(s) Topical <User Schedule>  levothyroxine 137 MICROGram(s) Oral daily  metoprolol succinate  milliGRAM(s) Oral daily  midodrine. 10 milliGRAM(s) Oral three times a day  nystatin Powder 1 Application(s) Topical two times a day  senna 2 Tablet(s) Oral at bedtime    PRN Inpatient Medications  acetaminophen     Tablet .. 650 milliGRAM(s) Oral every 6 hours PRN  oxyCODONE    IR 5 milliGRAM(s) Oral every 8 hours PRN  sodium chloride 0.9% lock flush 10 milliLiter(s) IV Push every 1 hour PRN    REVIEW OF SYSTEMS  --------------------------------------------------------------------------------  Gen: No fevers   Respiratory: No dyspnea  CV: No chest pain  GI: No abdominal pain, see HPI  : No dysuria  MSK: No  edema  Neuro: no dizziness   All other systems were reviewed and are negative, except as noted.    VITALS/PHYSICAL EXAM  --------------------------------------------------------------------------------  T(C): 36.8 (04-26-23 @ 04:38), Max: 36.9 (04-25-23 @ 17:37)  HR: 70 (04-26-23 @ 04:38) (67 - 90)  BP: 101/60 (04-26-23 @ 04:38) (85/51 - 117/59)  RR: 18 (04-26-23 @ 04:38) (15 - 20)  SpO2: 94% (04-26-23 @ 04:38) (91% - 100%)  Wt(kg): --  Height (cm): 177.8 (04-25-23 @ 11:19)  Weight (kg): 99.6 (04-25-23 @ 11:19)  BMI (kg/m2): 31.5 (04-25-23 @ 11:19)  BSA (m2): 2.17 (04-25-23 @ 11:19)    04-25-23 @ 07:01  -  04-26-23 @ 07:00  --------------------------------------------------------  IN: 0 mL / OUT: 30 mL / NET: -30 mL    Physical Exam:  	Gen: NAD  	HEENT: MMM  	Pulm: CTA B/L  	CV: S1S2  	Abd: Soft, +BS   	Ext: +LE edema B/L  	Neuro: Awake  	Skin: Warm and dry  	Vascular access: RIJ tunneled HD catheter+, R UE PICC line +    LABS/STUDIES  --------------------------------------------------------------------------------              7.2    9.76  >-----------<  244      [04-26-23 @ 07:41]              23.0     135  |  96  |  36  ----------------------------<  121      [04-25-23 @ 06:09]  4.1   |  27  |  4.43        Ca     7.9     [04-25-23 @ 06:09]    PTT: 130.6      [04-26-23 @ 07:41]    Creatinine Trend:  SCr 4.43 [04-25 @ 06:09]  SCr 5.84 [04-24 @ 07:05]  SCr 4.95 [04-23 @ 07:23]  SCr 5.02 [04-21 @ 06:37]  SCr 4.09 [04-20 @ 07:20]    Urinalysis - [04-13-23 @ 07:54]      Color DARK BROWN / Appearance Turbid / SG 1.026 / pH 6.5      Gluc 100 mg/dL / Ketone Trace  / Bili Negative / Urobili Negative       Blood Large / Protein 300 mg/dL / Leuk Est Large / Nitrite Negative      RBC >50 / WBC 3-5 / Hyaline 0-2 / Gran  / Sq Epi  / Non Sq Epi  / Bacteria Negative    HBsAb <3.0      [04-21-23 @ 15:53]  HBsAb Nonreact      [04-21-23 @ 15:53]  HBsAg Nonreact      [04-21-23 @ 15:53]  HBcAb Nonreact      [04-21-23 @ 15:53]  HCV 0.16, Nonreact      [04-21-23 @ 15:53]  HIV Nonreact      [04-06-23 @ 11:32]    JAGRUTI: titer 1:160, pattern Homogeneous      [04-06-23 @ 11:29]  C3 Complement 126      [04-06-23 @ 11:47]  C4 Complement 25      [04-06-23 @ 11:47]  ANCA: cANCA Negative, pANCA Negative, atypical ANCA Negative      [04-06-23 @ 11:29]  anti-GBM <0.2      [04-06-23 @ 11:46]  PLA2R: BIANCA 4.2, IFA --      [04-06-23 @ 11:46]  Immunofixation Serum:   Weak IgA Lambda Band Identified    Reference Range: None Detected      [04-06-23 @ 11:47]  SPEP Interpretation: Weak Beta-Migrating Paraprotein Identified      [04-06-23 @ 11:47]

## 2023-04-26 NOTE — PROGRESS NOTE ADULT - NEGATIVE NEUROLOGICAL SYMPTOMS
no headache/no confusion

## 2023-04-26 NOTE — PROGRESS NOTE ADULT - SKIN
warm and dry
warm and dry/color normal
warm and dry/color normal
warm and dry
warm and dry/color normal
warm and dry

## 2023-04-26 NOTE — PROGRESS NOTE ADULT - RESPIRATORY
no wheezes/diminished breath sounds, L/diminished breath sounds, R
clear to auscultation bilaterally/no wheezes
no wheezes/diminished breath sounds, L/diminished breath sounds, R

## 2023-04-26 NOTE — PROGRESS NOTE ADULT - NEGATIVE GASTROINTESTINAL SYMPTOMS
no vomiting/no diarrhea/no abdominal pain
no vomiting/no abdominal pain
no vomiting/no diarrhea/no abdominal pain

## 2023-04-26 NOTE — PROGRESS NOTE ADULT - PSYCHIATRIC
flat affect
flat affect
alert and oriented x3
flat affect
alert and oriented x3
flat affect
flat affect
alert and oriented x3
flat affect
alert and oriented x3
alert and oriented x3
flat affect

## 2023-04-26 NOTE — PROGRESS NOTE ADULT - PROBLEM SELECTOR PLAN 1
-only 1 out of 4 positive - still suspect this is real   -off ancef due to concern for AIN  -repeat bcx negative  -likely source is toe from recent nail clipping   -ERENDIRA negative for vegetations  -s/p PPM extraction and micra placement   -EP input noted  -also with low grade fevers  -picc  -6 weeks iv abx - day 26 of 42 of abx  -potential side effects of abx explained including GI, Cdiff, allergy issues, development of resistance, etc.  -potential side effects of PICC explained including bleeding and infection  -weekly cbc, bun/creatinine - fax [phone]  -OR cx negative  -dose vanco by level for now  - check level in AM -only 1 out of 4 positive - still suspect this is real   -off ancef due to concern for AIN  -repeat bcx negative  -likely source is toe from recent nail clipping   -ERENDIRA negative for vegetations  -s/p PPM extraction and micra placement   -EP input noted  -also with low grade fevers  -picc  -6 weeks iv abx - day 27 of 42 of abx  -potential side effects of abx explained including GI, Cdiff, allergy issues, development of resistance, etc.  -potential side effects of PICC explained including bleeding and infection  -weekly cbc, bun/creatinine - fax [phone]  -OR cx negative  -dose vanco by level for now  - check level in AM

## 2023-04-26 NOTE — PROGRESS NOTE ADULT - EYES
conjunctiva clear

## 2023-04-26 NOTE — PROGRESS NOTE ADULT - SWELLING OF EXTREMITY
lower L/lower R

## 2023-04-26 NOTE — PROGRESS NOTE ADULT - NEGATIVE CARDIOVASCULAR SYMPTOMS
no chest pain

## 2023-04-26 NOTE — PROGRESS NOTE ADULT - SUBJECTIVE AND OBJECTIVE BOX
date of service: 04-26-23 @ 09:05  afberile  REVIEW OF SYSTEMS:  CONSTITUTIONAL: No fever,  no  weight loss  ENT:  No  tinnitus,   no   vertigo  NECK: No pain or stiffness  RESPIRATORY: No cough, wheezing, chills or hemoptysis;    No Shortness of Breath  CARDIOVASCULAR: No chest pain, palpitations, dizziness  GASTROINTESTINAL: No abdominal or epigastric pain. No nausea, vomiting, or hematemesis; No diarrhea  No melena or hematochezia.  GENITOURINARY: No dysuria, frequency, hematuria, or incontinence  NEUROLOGICAL: No headaches  SKIN: No itching,  no   rash  LYMPH Nodes: No enlarged glands  ENDOCRINE: No heat or cold intolerance  MUSCULOSKELETAL: No joint pain or swelling  PSYCHIATRIC: No depression, anxiety  HEME/LYMPH: No easy bruising, or bleeding gums  ALLERGY AND IMMUNOLOGIC: No hives or eczema	    MEDICATIONS  (STANDING):  atorvastatin 40 milliGRAM(s) Oral at bedtime  bisacodyl 5 milliGRAM(s) Oral every 12 hours  chlorhexidine 4% Liquid 1 Application(s) Topical <User Schedule>  heparin  Infusion. 3200 Unit(s)/Hr (32 mL/Hr) IV Continuous <Continuous>  levothyroxine 137 MICROGram(s) Oral daily  metoprolol succinate  milliGRAM(s) Oral daily  midodrine. 10 milliGRAM(s) Oral three times a day  nystatin Powder 1 Application(s) Topical two times a day  senna 2 Tablet(s) Oral at bedtime    MEDICATIONS  (PRN):  acetaminophen     Tablet .. 650 milliGRAM(s) Oral every 6 hours PRN Mild Pain (1 - 3)  heparin   Injectable 8000 Unit(s) IV Push every 6 hours PRN For aPTT less than 40  heparin   Injectable 4000 Unit(s) IV Push every 6 hours PRN For aPTT between 40 - 57  oxyCODONE    IR 5 milliGRAM(s) Oral every 8 hours PRN Moderate Pain (4 - 6)  sodium chloride 0.9% lock flush 10 milliLiter(s) IV Push every 1 hour PRN Pre/post blood products, medications, blood draw, and to maintain line patency      Vital Signs Last 24 Hrs  T(C): 36.8 (26 Apr 2023 04:38), Max: 36.9 (25 Apr 2023 17:37)  T(F): 98.3 (26 Apr 2023 04:38), Max: 98.5 (25 Apr 2023 21:09)  HR: 70 (26 Apr 2023 04:38) (67 - 90)  BP: 101/60 (26 Apr 2023 04:38) (85/51 - 117/59)  BP(mean): 83 (25 Apr 2023 13:00) (63 - 83)  RR: 18 (26 Apr 2023 04:38) (15 - 20)  SpO2: 94% (26 Apr 2023 04:38) (91% - 100%)    Parameters below as of 26 Apr 2023 04:38  Patient On (Oxygen Delivery Method): room air      CAPILLARY BLOOD GLUCOSE        I&O's Summary    25 Apr 2023 07:01  -  26 Apr 2023 07:00  --------------------------------------------------------  IN: 0 mL / OUT: 30 mL / NET: -30 mL          Appearance: Normal	  HEENT:   Normal oral mucosa, PERRL, EOMI	  Lymphatic: No lymphadenopathy  Cardiovascular: Normal S1 S2, No JVD  Respiratory: Lungs clear to auscultation	  Gastrointestinal:  Soft, Non-tender, + BS	  Skin: No rash, No ecchymoses	  Extremities:     LABS:                        7.2    9.76  )-----------( 244      ( 26 Apr 2023 07:41 )             23.0     04-25    135  |  96  |  36<H>  ----------------------------<  121<H>  4.1   |  27  |  4.43<H>    Ca    7.9<L>      25 Apr 2023 06:09      PTT - ( 26 Apr 2023 07:41 )  PTT:130.6 sec                        Consultant(s) Notes Reviewed:      Care Discussed with Consultants/Other Providers:

## 2023-04-26 NOTE — PROGRESS NOTE ADULT - ASSESSMENT
76 y/o male PMHx HTN, HLD, CAD s/p stent on ASA, A-fib, hypothyroid, CHF now presenting to the ED worsening SOB, DUNCAN, peripheral edema, scrotal edema and 20 pound unintentional weight gain over last month with MSSA bacteremia, HF    Kevin Olmos  Attending Physician   Division of Infectious Disease  Office #[phone]  Available on Microsoft Teams also  After 5pm/weekend or no response, call #[phone]

## 2023-04-26 NOTE — PROGRESS NOTE ADULT - NEGATIVE GENERAL GENITOURINARY SYMPTOMS
no hematuria/no dysuria

## 2023-04-26 NOTE — PROGRESS NOTE ADULT - SUBJECTIVE AND OBJECTIVE BOX
Date of Service: 04-26-23 @ 08:00           CARDIOLOGY     PROGRESS  NOTE   ________________________________________________    CHIEF COMPLAINT:Patient is a 77y old  Male who presents with a chief complaint of Chart Reviewed, Events noted  " 77  year old male h/o  CAD s/p stent , asa. A-fib/ PPM, , hypothyroid, and total left knee replacement  prior  PPM  interrogation  with  [facility]/   s/p  brief   bursts  of  afib/ , on prior visit. Ct chest, retrosternal hematoma.  mod left pl effusion was  on bumex/  aldactone admitted with  worsening   pedal  edema  component  of  cellulitis   and  from  c/c  diastolic  chf,/  h/o  l/edema  of L  leg pt  admits to  being  non complaint with  meds"   no complain    	  REVIEW OF SYSTEMS:  CONSTITUTIONAL: No fever, weight loss, or fatigue  EYES: No eye pain, visual disturbances, or discharge  ENT:  No difficulty hearing, tinnitus, vertigo; No sinus or throat pain  NECK: No pain or stiffness  RESPIRATORY: No cough, wheezing, chills or hemoptysis; No Shortness of Breath  CARDIOVASCULAR: No chest pain, palpitations, passing out, dizziness, or leg swelling  GASTROINTESTINAL: No abdominal or epigastric pain. No nausea, vomiting, or hematemesis; No diarrhea or constipation. No melena or hematochezia.  GENITOURINARY: No dysuria, frequency, hematuria, or incontinence  NEUROLOGICAL: No headaches, memory loss, loss of strength, numbness, or tremors  SKIN: No itching, burning, rashes, or lesions   LYMPH Nodes: No enlarged glands  ENDOCRINE: No heat or cold intolerance; No hair loss  MUSCULOSKELETAL: No joint pain or swelling; No muscle, back, or extremity pain  PSYCHIATRIC: No depression, anxiety, mood swings, or difficulty sleeping  HEME/LYMPH: No easy bruising, or bleeding gums  ALLERGY AND IMMUNOLOGIC: No hives or eczema	    [x ] All others negative	  [ ] Unable to obtain    PHYSICAL EXAM:  T(C): 36.8 (04-26-23 @ 04:38), Max: 36.9 (04-25-23 @ 17:37)  HR: 70 (04-26-23 @ 04:38) (67 - 90)  BP: 101/60 (04-26-23 @ 04:38) (85/51 - 117/59)  RR: 18 (04-26-23 @ 04:38) (15 - 20)  SpO2: 94% (04-26-23 @ 04:38) (91% - 100%)  Wt(kg): --  I&O's Summary    25 Apr 2023 07:01  -  26 Apr 2023 07:00  --------------------------------------------------------  IN: 0 mL / OUT: 30 mL / NET: -30 mL        Appearance: Normal	  HEENT:   Normal oral mucosa, PERRL, EOMI	  Lymphatic: No lymphadenopathy  Cardiovascular: Normal S1 S2, No JVD, + murmurs, No edema  Respiratory: Lungs clear to auscultation	  Psychiatry: A & O x 3, Mood & affect appropriate  Gastrointestinal:  Soft, Non-tender, + BS	  Skin: No rashes, No ecchymoses, No cyanosis	  Neurologic: Non-focal  Extremities: Normal range of motion, No clubbing, cyanosis or edema  Vascular: Peripheral pulses palpable 2+ bilaterally    MEDICATIONS  (STANDING):  atorvastatin 40 milliGRAM(s) Oral at bedtime  bisacodyl 5 milliGRAM(s) Oral every 12 hours  chlorhexidine 4% Liquid 1 Application(s) Topical <User Schedule>  heparin  Infusion. 3200 Unit(s)/Hr (32 mL/Hr) IV Continuous <Continuous>  levothyroxine 137 MICROGram(s) Oral daily  metoprolol succinate  milliGRAM(s) Oral daily  midodrine. 10 milliGRAM(s) Oral three times a day  nystatin Powder 1 Application(s) Topical two times a day  senna 2 Tablet(s) Oral at bedtime      TELEMETRY: 	    ECG:  	  RADIOLOGY:  OTHER: 	  	  LABS:	 	    CARDIAC MARKERS:                                7.1    11.69 )-----------( 241      ( 25 Apr 2023 06:08 )             22.5     04-25    135  |  96  |  36<H>  ----------------------------<  121<H>  4.1   |  27  |  4.43<H>    Ca    7.9<L>      25 Apr 2023 06:09      proBNP:   Lipid Profile:   HgA1c:   TSH:   PTT - ( 26 Apr 2023 00:53 )  PTT:67.3 sec    ·  Problem: MSSA bacteremia.   ·  Plan: -only 1 out of 4 positive - still suspect this is real   -off ancef due to concern for AIN  -repeat bcx negative  -likely source is toe from recent nail clipping   -ERENDIRA negative for vegetations  -s/p PPM extraction and micra placement   -EP input noted  -also with low grade fevers  -picc  -6 weeks iv abx - day 25 of 42 of abx  -potential side effects of abx explained including GI, Cdiff, allergy issues, development of resistance, etc.  -potential side effects of PICC explained including bleeding and infection  -weekly cbc, bun/creatinine - fax [phone]  -OR cx negative  -dose vanco by level for now  - check level in AM.    Vancomycin Level, Random (04.25.23 @ 06:08)    Vancomycin Level, Random: 13.3: The "VANCOMYCIN, RANDOM" test should only be ordered for patients with  severe renal dysfunction or on dialysis. Therapeutic ranges have not been  established and results must be interpreted in the context of patient's  clinical condition.  NOTE: Therapeutic range for Trough Vancomycin is 10-20 mcg/mL. ug/mL        Assessment and plan  ---------------------------  77y m pmh BPH, Chronic venous insufficiency, HLD Hypothyroidism, S/P CABG x 2, S/P mitral valve replacement, SP Status post angioplasty, Sp TKR rt, Anasarca Pt comes to ed c/o shortness of breath for past month w progressive worsening of anasarca w swelling of scrotum and diff urinating, Has been noncompliant on diuretics for past two weeks w 20lb weight gain over past month. No fever and chills, sputum, nvdc, cp. PE Adult male lying stretcher w maked swelling to josh lower extr, w pitting edema,  chest  scant josh crackles w slight wheeze and prolonged exp phase  chf acute on chronic sec to RV dysfunction  tele, AFib hr controlled  will adjust cardiac meds  +BC i bottle, MSSA , s/p ppm explant  s/p PPM and LEAD extraction  oob too chair  PICC line needs 6 weeks of abx  acute on chronic renal failure/ renal appreciated  acute renal failure/ hematuria/ac was held awaiting renal eval/urology called awaiting ct abdomen and pelvis has ordered since last night awaiting results  beta blocker sed to rate control and hx of cad, s/p CABG  no further hematuria, start iv heparin low dose observe closely so far so good  on HD ?need of bumex  vanco level noted  cardiomyopathy can not tolerate ACE/ARB  a.fib hr is well controlled  hematuria, ac is held urology called, fu cbc closely discussed with ACP mat re start if hematuria has resolved  no further hematuria started on ac keep ptt 50 to 70  awaiting pathology  will add midodrine specially if needs hd  may consider to re start on Eliquis, will hold for now consider dc planning as out pt HD, dc Bumex  dc planning [facility] r[facility] on midodrine 10 mg tid awaiting awaiting pathology

## 2023-04-26 NOTE — PROGRESS NOTE ADULT - NEGATIVE GENERAL SYMPTOMS
no fever

## 2023-04-26 NOTE — PROGRESS NOTE ADULT - ATTENDING COMMENTS
77 year old male with PMH of Afib, CAD s/p CABG, BPH, HLD, HTN, and hypothyroidism who presented to the hospital on 3/25 with complaints of shortness of breath and wiley gain. The patient had been nonadherent with his diuretic regimen as outpatient and presented with acute on chronic decompensated heart failure. The patient was initiated on IV diuresis with significant improvement in volume status with approximately 15lb weight loss. Hospital course was further complicated by MSSA bacteremia requiring pacemaker extraction with micra implant and currently being managed with IVC abx with PICC line in place. The nephrology team was consulted for oliguric BEULAH. On review of [facility] pt noted to have a SCr of 1 on admission which since 4/1 has progressively increased to 9.66 initiated on HD 4/10. Last HD done on 4/14. Underwent kidney biopsy on 4/14. FOR HD today, +diarrhea.    1.  ARF--now HD dependent.  Orders reviewed in detail with fellow, HD RN.  Biopsy reviewed in detail with primarily interstitial disease c/w PYELONEPHRITIS emphasizing need for prolonged antibiotic rx. HOWEVER new IGA lambda may be significant with addl features post infectious GN Check PTH, VitD  2.  Hypotension on HD--improving.  HD orders reviewed with fellow, HD RN  3.  Pyelonephritis--by biopsy.  PROLONGED  ANTIBIOTICS   discussed with med team  Bg Valerio MD  contact me on TEAMS

## 2023-04-27 LAB
24R-OH-CALCIDIOL SERPL-MCNC: 36.6 NG/ML — SIGNIFICANT CHANGE UP (ref 30–80)
ANION GAP SERPL CALC-SCNC: 11 MMOL/L — SIGNIFICANT CHANGE UP (ref 5–17)
BLD GP AB SCN SERPL QL: NEGATIVE — SIGNIFICANT CHANGE UP
BUN SERPL-MCNC: 32 MG/DL — HIGH (ref 7–23)
CALCIUM SERPL-MCNC: 7.7 MG/DL — LOW (ref 8.4–10.5)
CALCIUM SERPL-MCNC: 7.8 MG/DL — LOW (ref 8.4–10.5)
CHLORIDE SERPL-SCNC: 98 MMOL/L — SIGNIFICANT CHANGE UP (ref 96–108)
CO2 SERPL-SCNC: 27 MMOL/L — SIGNIFICANT CHANGE UP (ref 22–31)
CREAT SERPL-MCNC: 4.01 MG/DL — HIGH (ref 0.5–1.3)
EGFR: 15 ML/MIN/1.73M2 — LOW
GLUCOSE SERPL-MCNC: 84 MG/DL — SIGNIFICANT CHANGE UP (ref 70–99)
HCT VFR BLD CALC: 22.6 % — LOW (ref 39–50)
HGB BLD-MCNC: 7.1 G/DL — LOW (ref 13–17)
IRON SATN MFR SERPL: 18 % — SIGNIFICANT CHANGE UP (ref 16–55)
IRON SATN MFR SERPL: 29 UG/DL — LOW (ref 45–165)
MCHC RBC-ENTMCNC: 28.9 PG — SIGNIFICANT CHANGE UP (ref 27–34)
MCHC RBC-ENTMCNC: 31.4 GM/DL — LOW (ref 32–36)
MCV RBC AUTO: 91.9 FL — SIGNIFICANT CHANGE UP (ref 80–100)
NRBC # BLD: 0 /100 WBCS — SIGNIFICANT CHANGE UP (ref 0–0)
PLATELET # BLD AUTO: 218 K/UL — SIGNIFICANT CHANGE UP (ref 150–400)
POTASSIUM SERPL-MCNC: 3.7 MMOL/L — SIGNIFICANT CHANGE UP (ref 3.5–5.3)
POTASSIUM SERPL-SCNC: 3.7 MMOL/L — SIGNIFICANT CHANGE UP (ref 3.5–5.3)
PTH-INTACT FLD-MCNC: 59 PG/ML — SIGNIFICANT CHANGE UP (ref 15–65)
RBC # BLD: 2.46 M/UL — LOW (ref 4.2–5.8)
RBC # FLD: 14.7 % — HIGH (ref 10.3–14.5)
RH IG SCN BLD-IMP: NEGATIVE — SIGNIFICANT CHANGE UP
SODIUM SERPL-SCNC: 136 MMOL/L — SIGNIFICANT CHANGE UP (ref 135–145)
TIBC SERPL-MCNC: 161 UG/DL — LOW (ref 220–430)
UIBC SERPL-MCNC: 132 UG/DL — SIGNIFICANT CHANGE UP (ref 110–370)
VANCOMYCIN FLD-MCNC: 17.3 UG/ML — SIGNIFICANT CHANGE UP
WBC # BLD: 7.44 K/UL — SIGNIFICANT CHANGE UP (ref 3.8–10.5)
WBC # FLD AUTO: 7.44 K/UL — SIGNIFICANT CHANGE UP (ref 3.8–10.5)

## 2023-04-27 PROCEDURE — 74018 RADEX ABDOMEN 1 VIEW: CPT | Mod: 26

## 2023-04-27 RX ORDER — VANCOMYCIN HCL 1 G
1000 VIAL (EA) INTRAVENOUS ONCE
Refills: 0 | Status: COMPLETED | OUTPATIENT
Start: 2023-04-27 | End: 2023-04-27

## 2023-04-27 RX ADMIN — Medication 650 MILLIGRAM(S): at 05:18

## 2023-04-27 RX ADMIN — ATORVASTATIN CALCIUM 40 MILLIGRAM(S): 80 TABLET, FILM COATED ORAL at 21:37

## 2023-04-27 RX ADMIN — Medication 250 MILLIGRAM(S): at 14:25

## 2023-04-27 RX ADMIN — OXYCODONE HYDROCHLORIDE 5 MILLIGRAM(S): 5 TABLET ORAL at 14:10

## 2023-04-27 RX ADMIN — Medication 650 MILLIGRAM(S): at 06:33

## 2023-04-27 RX ADMIN — APIXABAN 5 MILLIGRAM(S): 2.5 TABLET, FILM COATED ORAL at 17:56

## 2023-04-27 RX ADMIN — OXYCODONE HYDROCHLORIDE 5 MILLIGRAM(S): 5 TABLET ORAL at 13:08

## 2023-04-27 RX ADMIN — SENNA PLUS 2 TABLET(S): 8.6 TABLET ORAL at 21:38

## 2023-04-27 RX ADMIN — MIDODRINE HYDROCHLORIDE 10 MILLIGRAM(S): 2.5 TABLET ORAL at 05:17

## 2023-04-27 RX ADMIN — NYSTATIN CREAM 1 APPLICATION(S): 100000 CREAM TOPICAL at 05:17

## 2023-04-27 RX ADMIN — MIDODRINE HYDROCHLORIDE 10 MILLIGRAM(S): 2.5 TABLET ORAL at 13:09

## 2023-04-27 RX ADMIN — NYSTATIN CREAM 1 APPLICATION(S): 100000 CREAM TOPICAL at 17:56

## 2023-04-27 RX ADMIN — Medication 100 MILLIGRAM(S): at 05:18

## 2023-04-27 RX ADMIN — APIXABAN 5 MILLIGRAM(S): 2.5 TABLET, FILM COATED ORAL at 13:09

## 2023-04-27 RX ADMIN — CHLORHEXIDINE GLUCONATE 1 APPLICATION(S): 213 SOLUTION TOPICAL at 05:20

## 2023-04-27 RX ADMIN — Medication 137 MICROGRAM(S): at 05:17

## 2023-04-27 RX ADMIN — APIXABAN 5 MILLIGRAM(S): 2.5 TABLET, FILM COATED ORAL at 00:15

## 2023-04-27 RX ADMIN — MIDODRINE HYDROCHLORIDE 10 MILLIGRAM(S): 2.5 TABLET ORAL at 17:55

## 2023-04-27 NOTE — CONSULT NOTE ADULT - CONSULT REQUESTED DATE/TIME
14-Apr-2023 08:00
25-Mar-2023
28-Mar-2023
27-Apr-2023 14:31
05-Apr-2023 07:23
09-Apr-2023
28-Mar-2023 15:52

## 2023-04-27 NOTE — CONSULT NOTE ADULT - ASSESSMENT
Anemia from chronic disease, kidney failure, blood draws etc  IgA lambda on immunofixation, cannot quantify on SPEP, likely from inflammation of developing paraproteinemia not a cause of anemia or MARY. Will complete paraproteinemia labs 78 y/o male PMHx HTN, HLD, CAD s/p stent on ASA, A-fib, hypothyroid, CHF now presented 3/25/23 with worsening SOB, DUNCAN, peripheral edema, scrotal edema and 20 pound unintentional weight gain over a month. Patient reported non compliance with diuretics as he had doctors appts to attend to and did not want. Patient was experiencing difficulty voiding 2/2 scrotal edema. Denied CP, cough, fever, N/V/D,  The patient was initiated on IV diuresis with significant improvement in volume status with approximately 15lb weight loss. Hospital course was further complicated by MSSA bacteremia requiring pacemaker extraction with micra implant, being managed with IVC abx with PICC line in place for 6 weeks, ERENDIRA negative for vegetations. Patient developed oliguric BEULAH. Initiated on HD 4/10/23.  Underwent kidney biopsy on 4/14/23  Seen for monoclonal protein and anemia  Patient has weak IgA lambda on immunofixation, cannot quantify.  Pathology report from kidney is not diagnostic and gives broad differential- Differential diagnosis include: 1) post infectious glomerulitis; 2)   monoclonal immunoglobulin deposition disease with lambda restriction; and 3) an autoimmune process   Severe acute pyelonephritis - Acute interstitial nephritis, with a neutrophil-rich infiltrate and numerous neutrophilic   casts ("pus casts") - Acute tubular injury with focal necrosis   Thus it is hard to make dx of it is hard to make dx and treat it like LCDD. Will discuss with pathologist  I ordered free light chain ration and immunoglobulin panel.  I also ordered UPEP and immunofixation urine. May need bone marrow to evaluate for monoclonal plasma cell dyscrasia. He has multiple reasons for anemia, inflammation, kidney failure, blood   draws etc. however ordered def w/u and PRBC can be used in the mean time as needed. EPO as per renal.  I discussed in detail with patient's wife over the phone     78 y/o male PMHx HTN, HLD, CAD s/p stent on ASA, A-fib, hypothyroid, CHF  presented 3/25/23 with worsening SOB, DUNCAN, peripheral edema, scrotal edema and 20 pound unintentional weight gain over a month. Patient reported non compliance with diuretics as he had doctors appts to attend to and did not want. Patient was experiencing difficulty voiding 2/2 scrotal edema. Denied CP, cough, fever, N/V/D,  The patient was initiated on IV diuresis with significant improvement in volume status with approximately 15lb weight loss. Hospital course was further complicated by MSSA bacteremia requiring pacemaker extraction with micra implant, being managed with IVC abx with PICC line in place for 6 weeks, ERENDIRA negative for vegetations. Patient developed oliguric BEULAH. Initiated on HD 4/10/23.  Underwent kidney biopsy on 4/14/23  Seen for monoclonal protein and anemia  Patient has weak IgA lambda on immunofixation, cannot quantify.  Free light chain ratio serum normal  Immunoglobulin panel pending. UPEP and immunofixation urine  Pathology report from kidney is not diagnostic and gives broad differential- Differential diagnosis include: 1) post infectious glomerulitis; 2)   monoclonal immunoglobulin deposition disease with lambda restriction; and 3) an autoimmune process   Severe acute pyelonephritis - Acute interstitial nephritis, with a neutrophil-rich infiltrate and numerous neutrophilic   casts ("pus casts") - Acute tubular injury with focal necrosis   Thus it is hard to make dx of it is hard to make dx and treat it like LCDD. Will discuss with pathologist. So far we do not have significant paraproteinemia in serum and patholgoy report has broad diff  May need bone marrow to evaluate for monoclonal plasma cell dyscrasia. He has multiple reasons for anemia, inflammation, kidney failure, blood   draws etc.  PRBC can be used in the mean time as needed. EPO as per renal.  I discussed in detail with patient's wife over the phone

## 2023-04-27 NOTE — PROGRESS NOTE ADULT - SUBJECTIVE AND OBJECTIVE BOX
Date of Service: 04-27-23 @ 07:34           CARDIOLOGY     PROGRESS  NOTE   ________________________________________________    CHIEF COMPLAINT:Patient is a 77y old  Male who presents with a chief complaint of Chart Reviewed, Events noted  " 77  year old male h/o  CAD s/p stent , asa. A-fib/ PPM, , hypothyroid, and total left knee replacement  prior  PPM  interrogation  with  [facility]/   s/p  brief   bursts  of  afib/ , on prior visit. Ct chest, retrosternal hematoma.  mod left pl effusion was  on bumex/  aldactone admitted with  worsening   pedal  edema  component  of  cellulitis   and  from  c/c  diastolic  chf,/  h/o  l/edema  of L  leg pt  admits to  being  non complaint with  meds"    no complain    	  REVIEW OF SYSTEMS:  CONSTITUTIONAL: No fever, weight loss, or fatigue  EYES: No eye pain, visual disturbances, or discharge  ENT:  No difficulty hearing, tinnitus, vertigo; No sinus or throat pain  NECK: No pain or stiffness  RESPIRATORY: No cough, wheezing, chills or hemoptysis; + Shortness of Breath  CARDIOVASCULAR: No chest pain, palpitations, passing out, dizziness, or leg swelling  GASTROINTESTINAL: No abdominal or epigastric pain. No nausea, vomiting, or hematemesis; No diarrhea or constipation. No melena or hematochezia.  GENITOURINARY: No dysuria, frequency, hematuria, or incontinence  NEUROLOGICAL: No headaches, memory loss, loss of strength, numbness, or tremors  SKIN: No itching, burning, rashes, or lesions   LYMPH Nodes: No enlarged glands  ENDOCRINE: No heat or cold intolerance; No hair loss  MUSCULOSKELETAL: No joint pain or swelling; No muscle, back, or extremity pain  PSYCHIATRIC: No depression, anxiety, mood swings, or difficulty sleeping  HEME/LYMPH: No easy bruising, or bleeding gums  ALLERGY AND IMMUNOLOGIC: No hives or eczema	    [x ] All others negative	  [ ] Unable to obtain    PHYSICAL EXAM:  T(C): 36.4 (04-27-23 @ 05:01), Max: 36.9 (04-26-23 @ 12:02)  HR: 81 (04-27-23 @ 05:01) (50 - 84)  BP: 99/52 (04-27-23 @ 05:01) (91/57 - 114/69)  RR: 18 (04-27-23 @ 05:01) (17 - 19)  SpO2: 95% (04-27-23 @ 05:01) (95% - 98%)  Wt(kg): --  I&O's Summary    26 Apr 2023 07:01  -  27 Apr 2023 07:00  --------------------------------------------------------  IN: 220 mL / OUT: 0 mL / NET: 220 mL        Appearance: Normal	  HEENT:   Normal oral mucosa, PERRL, EOMI	  Lymphatic: No lymphadenopathy  Cardiovascular: Normal S1 S2, No JVD, + murmurs, No edema  Respiratory: Lungs clear to auscultation	  Psychiatry: A & O x 3, Mood & affect appropriate  Gastrointestinal:  Soft, Non-tender, + BS	  Skin: No rashes, No ecchymoses, No cyanosis	  Neurologic: Non-focal  Extremities: Normal range of motion, No clubbing, cyanosis or edema  Vascular: Peripheral pulses palpable 2+ bilaterally    MEDICATIONS  (STANDING):  apixaban 5 milliGRAM(s) Oral two times a day  atorvastatin 40 milliGRAM(s) Oral at bedtime  bisacodyl 5 milliGRAM(s) Oral every 12 hours  chlorhexidine 4% Liquid 1 Application(s) Topical <User Schedule>  levothyroxine 137 MICROGram(s) Oral daily  metoprolol succinate  milliGRAM(s) Oral daily  midodrine. 10 milliGRAM(s) Oral three times a day  nystatin Powder 1 Application(s) Topical two times a day  senna 2 Tablet(s) Oral at bedtime      TELEMETRY: 	    ECG:  	  RADIOLOGY:  OTHER: 	  	  LABS:	 	    CARDIAC MARKERS:                          7.1    7.44  )-----------( 218      ( 27 Apr 2023 07:09 )             22.6     proBNP:   Lipid Profile:   HgA1c:   TSH:   PTT - ( 26 Apr 2023 07:41 )  PTT:130.6 sec      Assessment and plan  ---------------------------  77y m pmh BPH, Chronic venous insufficiency, HLD Hypothyroidism, S/P CABG x 2, S/P mitral valve replacement, SP Status post angioplasty, Sp TKR rt, Anasarca Pt comes to ed c/o shortness of breath for past month w progressive worsening of anasarca w swelling of scrotum and diff urinating, Has been noncompliant on diuretics for past two weeks w 20lb weight gain over past month. No fever and chills, sputum, nvdc, cp. PE Adult male lying stretcher w maked swelling to josh lower extr, w pitting edema,  chest  scant josh crackles w slight wheeze and prolonged exp phase  chf acute on chronic sec to RV dysfunction  tele, AFib hr controlled  will adjust cardiac meds  +BC i bottle, MSSA , s/p ppm explant  s/p PPM and LEAD extraction  oob too chair  PICC line needs 6 weeks of abx  acute on chronic renal failure/ renal appreciated  acute renal failure/ hematuria/ac was held awaiting renal eval/urology called awaiting ct abdomen and pelvis has ordered since last night awaiting results  beta blocker sed to rate control and hx of cad, s/p CABG  no further hematuria, start iv heparin low dose observe closely so far so good  on HD ?need of bumex  vanco level noted  cardiomyopathy can not tolerate ACE/ARB  a.fib hr is well controlled  hematuria, ac is held urology called, fu cbc closely discussed with ACP mat re start if hematuria has resolved  no further hematuria started on ac keep ptt 50 to 70  awaiting pathology  will add midodrine specially if needs hd  may consider to re start on Eliquis, will hold for now consider dc planning as out pt HD, dc Bumex  dc planning as pt is scheduled for out pt HD/ shiley dialysis cathete rstart on midodrine 10 mg tid awaiting awaiting pathology  decrease hgb ?chronic transfuse one unit of prbc on HD

## 2023-04-27 NOTE — CONSULT NOTE ADULT - PROVIDER SPECIALTY LIST ADULT
Intervent Radiology
Intervent Radiology
Urology
Heme/Onc
Intervent Radiology
Electrophysiology
Internal Medicine
Nephrology
Infectious Disease

## 2023-04-27 NOTE — PROGRESS NOTE ADULT - SUBJECTIVE AND OBJECTIVE BOX
date of service: 04-27-23 @ 07:16  afebrile  REVIEW OF SYSTEMS:  CONSTITUTIONAL: No fever,  no  weight loss  ENT:  No  tinnitus,   no   vertigo  NECK: No pain or stiffness  RESPIRATORY: No cough, wheezing, chills or hemoptysis;    No Shortness of Breath  CARDIOVASCULAR: No chest pain, palpitations, dizziness  GASTROINTESTINAL: No abdominal or epigastric pain. No nausea, vomiting, or hematemesis; No diarrhea  No melena or hematochezia.  GENITOURINARY: No dysuria, frequency, hematuria, or incontinence  NEUROLOGICAL: No headaches  SKIN: No itching,  no   rash  LYMPH Nodes: No enlarged glands  ENDOCRINE: No heat or cold intolerance  MUSCULOSKELETAL: No joint pain or swelling  PSYCHIATRIC: No depression, anxiety  HEME/LYMPH: No easy bruising, or bleeding gums  ALLERGY AND IMMUNOLOGIC: No hives or eczema	    MEDICATIONS  (STANDING):  apixaban 5 milliGRAM(s) Oral two times a day  atorvastatin 40 milliGRAM(s) Oral at bedtime  bisacodyl 5 milliGRAM(s) Oral every 12 hours  chlorhexidine 4% Liquid 1 Application(s) Topical <User Schedule>  levothyroxine 137 MICROGram(s) Oral daily  metoprolol succinate  milliGRAM(s) Oral daily  midodrine. 10 milliGRAM(s) Oral three times a day  nystatin Powder 1 Application(s) Topical two times a day  senna 2 Tablet(s) Oral at bedtime    MEDICATIONS  (PRN):  acetaminophen     Tablet .. 650 milliGRAM(s) Oral every 6 hours PRN Mild Pain (1 - 3)  oxyCODONE    IR 5 milliGRAM(s) Oral every 8 hours PRN Moderate Pain (4 - 6)  sodium chloride 0.9% lock flush 10 milliLiter(s) IV Push every 1 hour PRN Pre/post blood products, medications, blood draw, and to maintain line patency      Vital Signs Last 24 Hrs  T(C): 36.4 (27 Apr 2023 05:01), Max: 36.9 (26 Apr 2023 12:02)  T(F): 97.5 (27 Apr 2023 05:01), Max: 98.5 (26 Apr 2023 12:02)  HR: 81 (27 Apr 2023 05:01) (50 - 84)  BP: 99/52 (27 Apr 2023 05:01) (91/57 - 114/69)  BP(mean): --  RR: 18 (27 Apr 2023 05:01) (17 - 19)  SpO2: 95% (27 Apr 2023 05:01) (95% - 98%)    Parameters below as of 27 Apr 2023 05:01  Patient On (Oxygen Delivery Method): nasal cannula      CAPILLARY BLOOD GLUCOSE        I&O's Summary    26 Apr 2023 07:01  -  27 Apr 2023 07:00  --------------------------------------------------------  IN: 220 mL / OUT: 0 mL / NET: 220 mL          Appearance: Normal	  HEENT:   Normal oral mucosa, PERRL, EOMI	  Lymphatic: No lymphadenopathy  Cardiovascular: Normal S1 S2, No JVD  Respiratory: Lungs clear to auscultation	  Gastrointestinal:  Soft, Non-tender, + BS	  Skin: No rash, No ecchymoses	  Extremities:     LABS:                        7.2    9.76  )-----------( 244      ( 26 Apr 2023 07:41 )             23.0           PTT - ( 26 Apr 2023 07:41 )  PTT:130.6 sec                        Consultant(s) Notes Reviewed:      Care Discussed with Consultants/Other Providers:

## 2023-04-27 NOTE — PROGRESS NOTE ADULT - ASSESSMENT
77  year old male    h/o  CAD s/p stent , asa.      A-fib/ PPM, , hypothyroid, and total left knee replacement    prior  PPM  interrogation  with  [facility]/   s/p  brief   bursts  of  afib/ , on prior visit   Ct chest, retrosternal hematoma.  mod left pl effusion    was  on bumex/  aldactone        *    admitted   for  worsening   pedal  edema          component  of  cellulitis   and  from  c/c  diastolic  chf,/  h/o  l/edema  of L  leg         pt  admits to  being  non complaint with  meds        *   h/o    c/c AFIB,   has  PPM         on eliquis     *    Anemia, , hb stable, had  been seen by  gi  eval dr joann sanchez in past     *    CAD,     cath, with 2 vessel disease,  s/p  CABG and  MVR  surg d r marni   *      h/o  Afib on    eliquis          c/c leg redness/  4 +  edema, on keflex, has  improved    *     echo,  on 7/2022,  ef  35/ new/ severe  TR          cath,   was non  obstructive    *    on synthroid          on asa/  torpol/  eliquis          crt  noted,   from  lasix// aldactone.  leg  swelling  is  lessening   *   MSSA  bacterinia           was  on iv ancef.  ,  rpt bcx    are  negative          explant pf  ppm  and  Micra  placement on  3/30/23. dr sunitha rocha   *    Echo,  ef  25,  mod  AI.  severe  TR        PICC  line  and  extended  course  of  ab.         BEULAH,  /   ?  AIN,  hence   ab was  switched   to iv vanco,  per  ID         Afib., on eliquis         crt   was  9,9, now  on HD            s/p  renal bx on 4/ 14.. 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 + , Ig A  Lamda.  / heme  eval         card /  dr mikayla dick         rad< from: TTE with Doppler (w/Cont) (07.05.22 @ 09:42) >  Conclusions:  Endocardial visualization enhanced with intravenous  injection of Ultrasonic Enhancing Agent (Definity).  Severe left ventricular enlargement.  Estimated ejection fraction 35%. Diffuse hypokinesis, with  inferior akinesis.  Bioprosthetic mitral valve with normal function.  Right ventricular enlargement with decreased right  ventricular systolic function.  A device wire is noted in the right heart.  ------------------------------------------------------------------------  Confirmed on  7/5/2022 - 12:16:10 by Kristian    < end of copied text >

## 2023-04-27 NOTE — CONSULT NOTE ADULT - CONSULT REQUESTED BY NAME
[facility]
Gene
Medical Team
dr thornton
medicine
Medicine
Dr. Christensen

## 2023-04-28 LAB
ANION GAP SERPL CALC-SCNC: 12 MMOL/L — SIGNIFICANT CHANGE UP (ref 5–17)
BUN SERPL-MCNC: 42 MG/DL — HIGH (ref 7–23)
CALCIUM SERPL-MCNC: 8.2 MG/DL — LOW (ref 8.4–10.5)
CHLORIDE SERPL-SCNC: 95 MMOL/L — LOW (ref 96–108)
CO2 SERPL-SCNC: 26 MMOL/L — SIGNIFICANT CHANGE UP (ref 22–31)
CREAT SERPL-MCNC: 4.87 MG/DL — HIGH (ref 0.5–1.3)
EGFR: 12 ML/MIN/1.73M2 — LOW
FERRITIN SERPL-MCNC: 438 NG/ML — HIGH (ref 30–400)
GLUCOSE SERPL-MCNC: 91 MG/DL — SIGNIFICANT CHANGE UP (ref 70–99)
HCT VFR BLD CALC: 27 % — LOW (ref 39–50)
HGB BLD-MCNC: 8.8 G/DL — LOW (ref 13–17)
IGA FLD-MCNC: 544 MG/DL — HIGH (ref 84–499)
IGG FLD-MCNC: 1587 MG/DL — SIGNIFICANT CHANGE UP (ref 610–1660)
IGM SERPL-MCNC: 40 MG/DL — SIGNIFICANT CHANGE UP (ref 35–242)
KAPPA LC SER QL IFE: 25.56 MG/DL — HIGH (ref 0.33–1.94)
KAPPA LC SER QL IFE: 25.56 MG/DL — HIGH (ref 0.33–1.94)
KAPPA/LAMBDA FREE LIGHT CHAIN RATIO, SERUM: 1.01 RATIO — SIGNIFICANT CHANGE UP (ref 0.26–1.65)
KAPPA/LAMBDA FREE LIGHT CHAIN RATIO, SERUM: 1.01 RATIO — SIGNIFICANT CHANGE UP (ref 0.26–1.65)
LAMBDA LC SER QL IFE: 25.37 MG/DL — HIGH (ref 0.57–2.63)
LAMBDA LC SER QL IFE: 25.37 MG/DL — HIGH (ref 0.57–2.63)
MCHC RBC-ENTMCNC: 29.2 PG — SIGNIFICANT CHANGE UP (ref 27–34)
MCHC RBC-ENTMCNC: 32.6 GM/DL — SIGNIFICANT CHANGE UP (ref 32–36)
MCV RBC AUTO: 89.7 FL — SIGNIFICANT CHANGE UP (ref 80–100)
NIGHT BLUE STAIN TISS: SIGNIFICANT CHANGE UP
NRBC # BLD: 0 /100 WBCS — SIGNIFICANT CHANGE UP (ref 0–0)
PLATELET # BLD AUTO: 232 K/UL — SIGNIFICANT CHANGE UP (ref 150–400)
POTASSIUM SERPL-MCNC: 4 MMOL/L — SIGNIFICANT CHANGE UP (ref 3.5–5.3)
POTASSIUM SERPL-SCNC: 4 MMOL/L — SIGNIFICANT CHANGE UP (ref 3.5–5.3)
RBC # BLD: 3.01 M/UL — LOW (ref 4.2–5.8)
RBC # FLD: 15.1 % — HIGH (ref 10.3–14.5)
SODIUM SERPL-SCNC: 133 MMOL/L — LOW (ref 135–145)
SPECIMEN SOURCE: SIGNIFICANT CHANGE UP
VANCOMYCIN FLD-MCNC: 23.8 UG/ML — SIGNIFICANT CHANGE UP
VIT B12 SERPL-MCNC: 492 PG/ML — SIGNIFICANT CHANGE UP (ref 232–1245)
WBC # BLD: 10.22 K/UL — SIGNIFICANT CHANGE UP (ref 3.8–10.5)
WBC # FLD AUTO: 10.22 K/UL — SIGNIFICANT CHANGE UP (ref 3.8–10.5)

## 2023-04-28 PROCEDURE — 99232 SBSQ HOSP IP/OBS MODERATE 35: CPT | Mod: GC

## 2023-04-28 RX ORDER — MIDODRINE HYDROCHLORIDE 2.5 MG/1
10 TABLET ORAL ONCE
Refills: 0 | Status: COMPLETED | OUTPATIENT
Start: 2023-04-28 | End: 2023-04-28

## 2023-04-28 RX ORDER — FERROUS SULFATE 325(65) MG
325 TABLET ORAL DAILY
Refills: 0 | Status: DISCONTINUED | OUTPATIENT
Start: 2023-04-28 | End: 2023-05-01

## 2023-04-28 RX ADMIN — MIDODRINE HYDROCHLORIDE 10 MILLIGRAM(S): 2.5 TABLET ORAL at 18:20

## 2023-04-28 RX ADMIN — Medication 650 MILLIGRAM(S): at 08:57

## 2023-04-28 RX ADMIN — CHLORHEXIDINE GLUCONATE 1 APPLICATION(S): 213 SOLUTION TOPICAL at 05:15

## 2023-04-28 RX ADMIN — Medication 325 MILLIGRAM(S): at 18:22

## 2023-04-28 RX ADMIN — APIXABAN 5 MILLIGRAM(S): 2.5 TABLET, FILM COATED ORAL at 05:16

## 2023-04-28 RX ADMIN — MIDODRINE HYDROCHLORIDE 10 MILLIGRAM(S): 2.5 TABLET ORAL at 09:29

## 2023-04-28 RX ADMIN — APIXABAN 5 MILLIGRAM(S): 2.5 TABLET, FILM COATED ORAL at 18:19

## 2023-04-28 RX ADMIN — NYSTATIN CREAM 1 APPLICATION(S): 100000 CREAM TOPICAL at 05:15

## 2023-04-28 RX ADMIN — Medication 100 MILLIGRAM(S): at 05:15

## 2023-04-28 RX ADMIN — MIDODRINE HYDROCHLORIDE 10 MILLIGRAM(S): 2.5 TABLET ORAL at 05:17

## 2023-04-28 RX ADMIN — Medication 137 MICROGRAM(S): at 05:15

## 2023-04-28 RX ADMIN — NYSTATIN CREAM 1 APPLICATION(S): 100000 CREAM TOPICAL at 18:20

## 2023-04-28 RX ADMIN — ATORVASTATIN CALCIUM 40 MILLIGRAM(S): 80 TABLET, FILM COATED ORAL at 22:14

## 2023-04-28 RX ADMIN — MIDODRINE HYDROCHLORIDE 10 MILLIGRAM(S): 2.5 TABLET ORAL at 09:37

## 2023-04-28 NOTE — PROGRESS NOTE ADULT - ATTENDING COMMENTS
77 year old male with PMH of Afib, CAD s/p CABG, BPH, HLD, HTN, and hypothyroidism who presented to the hospital on 3/25 with complaints of shortness of breath and wiley gain. The patient had been nonadherent with his diuretic regimen as outpatient and presented with acute on chronic decompensated heart failure. The patient was initiated on IV diuresis with significant improvement in volume status with approximately 15lb weight loss. Hospital course was further complicated by MSSA bacteremia requiring pacemaker extraction with micra implant and currently being managed with IVC abx with PICC line in place. The nephrology team was consulted for oliguric BEULAH. On review of [facility] pt noted to have a SCr of 1 on admission which since 4/1 has progressively increased to 9.66 initiated on HD 4/10. Last HD done on 4/14. Underwent kidney biopsy on 4/14. FOR HD today, +diarrhea. No new symptoms   1.  ARF--now HD dependent.  Orders reviewed in detail with fellow, AVI RN.  Biopsy reviewed in detail with primarily interstitial disease c/w PYELONEPHRITIS emphasizing need for prolonged antibiotic rx. HOWEVER new IGA lambda may be significant (although hemeonc indicates unlikely) with addl features post infectious GN Check PTH, VitD  2.  Hypotension on HD--improving.  HD orders reviewed with fellow, AVI RN  3.  Pyelonephritis--by biopsy.  PROLONGED  ANTIBIOTICS.  HAS PICC  discussed with med team  Bg Valerio MD  contact me on TEAMS

## 2023-04-28 NOTE — PROGRESS NOTE ADULT - ASSESSMENT
77  year old male    h/o  CAD s/p stent , asa.      A-fib/ PPM, , hypothyroid, and total left knee replacement    prior  PPM  interrogation  with  WCT/   s/p  brief   bursts  of  afib/ , on prior visit   Ct chest, retrosternal hematoma.  mod left pl effusion    was  on bumex/  aldactone        *    admitted   for  worsening   pedal  edema          component  of  cellulitis   and  from  c/c  diastolic  chf,/  h/o  l/edema  of L  leg         pt  admits to  being  non complaint with  meds        *   h/o    c/c AFIB,   has  PPM         on eliquis     *    Anemia, , hb stable, had  been seen by  gi  eval dr joann sanchez in past     *    CAD,     cath, with 2 vessel disease,  s/p  CABG and  MVR  surg d r marni   *      h/o  Afib on    eliquis          c/c leg redness/  4 +  edema, on keflex, has  improved    *     echo,  on 7/2022,  ef  35/ new/ severe  TR          cath,   was non  obstructive    *    on synthroid          on asa/  torpol/  eliquis          crt  noted,   from  lasix// aldactone.  leg  swelling  is  lessening   *   MSSA  bacterinia           was  on iv ancef.  ,  rpt bcx    are  negative          explant pf  ppm  and  Micra  placement on  3/30/23. dr sunitha rocha   *    Echo,  ef  25,  mod  AI.  severe  TR        PICC  line  and  extended  course  of  ab.         BEULAH,  /   ?  AIN,  hence   ab was  switched   to iv vanco,  per  ID         Afib., on eliquis         crt   was  9,9, now  on HD            s/p  renal bx on 4/ 14.. 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 + , Ig A  Lamda.  / heme  eval  dr dobson/t may  need  bone marrow  bx  to  r/o plasma cell  dyscrasia          card /  dr mikayla dick         rad< from: TTE with Doppler (w/Cont) (07.05.22 @ 09:42) >  Conclusions:  Endocardial visualization enhanced with intravenous  injection of Ultrasonic Enhancing Agent (Definity).  Severe left ventricular enlargement.  Estimated ejection fraction 35%. Diffuse hypokinesis, with  inferior akinesis.  Bioprosthetic mitral valve with normal function.  Right ventricular enlargement with decreased right  ventricular systolic function.  A device wire is noted in the right heart.  ------------------------------------------------------------------------  Confirmed on  7/5/2022 - 12:16:10 by Kristian    < end of copied text >

## 2023-04-28 NOTE — PROGRESS NOTE ADULT - SUBJECTIVE AND OBJECTIVE BOX
Date of Service: 04-28-23 @ 07:39           CARDIOLOGY     PROGRESS  NOTE   ________________________________________________    CHIEF COMPLAINT:Patient is a 77y old  Male who presents with a chief complaint of CHF (27 Apr 2023 14:30)  no complain  	  REVIEW OF SYSTEMS:  CONSTITUTIONAL: No fever, weight loss, or fatigue  EYES: No eye pain, visual disturbances, or discharge  ENT:  No difficulty hearing, tinnitus, vertigo; No sinus or throat pain  NECK: No pain or stiffness  RESPIRATORY: No cough, wheezing, chills or hemoptysis; No Shortness of Breath  CARDIOVASCULAR: No chest pain, palpitations, passing out, dizziness, or leg swelling  GASTROINTESTINAL: No abdominal or epigastric pain. No nausea, vomiting, or hematemesis; No diarrhea or constipation. No melena or hematochezia.  GENITOURINARY: No dysuria, frequency, hematuria, or incontinence  NEUROLOGICAL: No headaches, memory loss, loss of strength, numbness, or tremors  SKIN: No itching, burning, rashes, or lesions   LYMPH Nodes: No enlarged glands  ENDOCRINE: No heat or cold intolerance; No hair loss  MUSCULOSKELETAL: No joint pain or swelling; No muscle, back, or extremity pain  PSYCHIATRIC: No depression, anxiety, mood swings, or difficulty sleeping  HEME/LYMPH: No easy bruising, or bleeding gums  ALLERGY AND IMMUNOLOGIC: No hives or eczema	    [x ] All others negative	  [ ] Unable to obtain    PHYSICAL EXAM:  T(C): 36.4 (04-28-23 @ 04:52), Max: 36.8 (04-27-23 @ 17:08)  HR: 70 (04-28-23 @ 04:52) (67 - 75)  BP: 121/73 (04-28-23 @ 04:52) (114/68 - 122/74)  RR: 18 (04-28-23 @ 04:52) (18 - 19)  SpO2: 96% (04-28-23 @ 04:52) (94% - 96%)  Wt(kg): --  I&O's Summary    27 Apr 2023 07:01  -  28 Apr 2023 07:00  --------------------------------------------------------  IN: 220 mL / OUT: 200 mL / NET: 20 mL        Appearance: Normal	  HEENT:   Normal oral mucosa, PERRL, EOMI	  Lymphatic: No lymphadenopathy  Cardiovascular: Normal S1 S2, No JVD, + murmurs, + edema  Respiratory: rhonchi  Psychiatry: A & O x 3, Mood & affect appropriate  Gastrointestinal:  Soft, Non-tender, + BS	  Skin: No rashes, No ecchymoses, No cyanosis	  Neurologic: Non-focal  Extremities: Normal range of motion, No clubbing, cyanosis + edema  Vascular: + pvd    MEDICATIONS  (STANDING):  apixaban 5 milliGRAM(s) Oral two times a day  atorvastatin 40 milliGRAM(s) Oral at bedtime  chlorhexidine 4% Liquid 1 Application(s) Topical <User Schedule>  levothyroxine 137 MICROGram(s) Oral daily  metoprolol succinate  milliGRAM(s) Oral daily  midodrine. 10 milliGRAM(s) Oral three times a day  nystatin Powder 1 Application(s) Topical two times a day  senna 2 Tablet(s) Oral at bedtime      TELEMETRY: 	    ECG:  	  RADIOLOGY:  OTHER: 	  	  LABS:	 	    CARDIAC MARKERS:                                8.8    10.22 )-----------( 232      ( 28 Apr 2023 06:32 )             27.0     04-28    133<L>  |  95<L>  |  42<H>  ----------------------------<  91  4.0   |  26  |  4.87<H>    Ca    8.2<L>      28 Apr 2023 06:29      proBNP:   Lipid Profile:   HgA1c:   TSH:   PTT - ( 26 Apr 2023 07:41 )  PTT:130.6 sec    Thus it is hard to make dx of it is hard to make dx and treat it like LCDD. Will discuss with pathologist  I ordered free light chain ration and immunoglobulin panel.  I also ordered UPEP and immunofixation urine. May need bone marrow to evaluate for monoclonal plasma cell dyscrasia. He has multiple reasons for anemia, inflammation, kidney failure, blood   draws etc. however ordered def w/u and PRBC can be used in the mean time as needed. EPO as per renal.  I discussed in detail with patient's wife over the phone    Assessment and plan  ---------------------------  77y m pmh BPH, Chronic venous insufficiency, HLD Hypothyroidism, S/P CABG x 2, S/P mitral valve replacement, SP Status post angioplasty, Sp TKR rt, Anasarca Pt comes to ed c/o shortness of breath for past month w progressive worsening of anasarca w swelling of scrotum and diff urinating, Has been noncompliant on diuretics for past two weeks w 20lb weight gain over past month. No fever and chills, sputum, nvdc, cp. PE Adult male lying stretcher w maked swelling to josh lower extr, w pitting edema,  chest  scant josh crackles w slight wheeze and prolonged exp phase  chf acute on chronic sec to RV dysfunction  tele, AFib hr controlled  +BC i bottle, MSSA , s/p ppm explant  s/p PPM and LEAD extraction  acute on chronic renal failure/ renal appreciated  acute renal failure/ hematuria/ac was held awaiting renal eval/urology called awaiting ct abdomen and pelvis has ordered since last night awaiting results  beta blocker sed to rate control and hx of cad, s/p CABG  vanco level noted  cardiomyopathy can not tolerate ACE/ARB  a.fib hr is well controlled  will add midodrine specially if needs hd  may consider to re start on Eliquis, will hold for now consider dc planning as out pt HD, dc Bumex  dc planning as pt is scheduled for out pt HD/ shiley dialysis cathete rstart on midodrine 10 mg tid awaiting   awaiting pathology/ onc appreciated

## 2023-04-28 NOTE — PROGRESS NOTE ADULT - SUBJECTIVE AND OBJECTIVE BOX
date of service: 04-28-23 @ 09:06  afebrile  REVIEW OF SYSTEMS:  CONSTITUTIONAL: No fever,  no  weight loss  ENT:  No  tinnitus,   no   vertigo  NECK: No pain or stiffness  RESPIRATORY: No cough, wheezing, chills or hemoptysis;    No Shortness of Breath  CARDIOVASCULAR: No chest pain, palpitations, dizziness  GASTROINTESTINAL: No abdominal or epigastric pain. No nausea, vomiting, or hematemesis; No diarrhea  No melena or hematochezia.  GENITOURINARY: No dysuria, frequency, hematuria, or incontinence  NEUROLOGICAL: No headaches  SKIN: No itching,  no   rash  LYMPH Nodes: No enlarged glands  ENDOCRINE: No heat or cold intolerance  MUSCULOSKELETAL: No joint pain or swelling  PSYCHIATRIC: No depression, anxiety  HEME/LYMPH: No easy bruising, or bleeding gums  ALLERGY AND IMMUNOLOGIC: No hives or eczema	    MEDICATIONS  (STANDING):  apixaban 5 milliGRAM(s) Oral two times a day  atorvastatin 40 milliGRAM(s) Oral at bedtime  chlorhexidine 4% Liquid 1 Application(s) Topical <User Schedule>  levothyroxine 137 MICROGram(s) Oral daily  metoprolol succinate  milliGRAM(s) Oral daily  midodrine. 10 milliGRAM(s) Oral three times a day  midodrine. 10 milliGRAM(s) Oral once  nystatin Powder 1 Application(s) Topical two times a day  senna 2 Tablet(s) Oral at bedtime    MEDICATIONS  (PRN):  acetaminophen     Tablet .. 650 milliGRAM(s) Oral every 6 hours PRN Mild Pain (1 - 3)  bisacodyl 5 milliGRAM(s) Oral every 12 hours PRN Constipation  oxyCODONE    IR 5 milliGRAM(s) Oral every 8 hours PRN Moderate Pain (4 - 6)  sodium chloride 0.9% lock flush 10 milliLiter(s) IV Push every 1 hour PRN Pre/post blood products, medications, blood draw, and to maintain line patency      Vital Signs Last 24 Hrs  T(C): 36.4 (28 Apr 2023 04:52), Max: 36.8 (27 Apr 2023 17:08)  T(F): 97.5 (28 Apr 2023 04:52), Max: 98.3 (27 Apr 2023 17:08)  HR: 70 (28 Apr 2023 04:52) (67 - 75)  BP: 121/73 (28 Apr 2023 04:52) (114/68 - 122/74)  BP(mean): --  RR: 18 (28 Apr 2023 04:52) (18 - 19)  SpO2: 96% (28 Apr 2023 04:52) (94% - 96%)    Parameters below as of 28 Apr 2023 04:52  Patient On (Oxygen Delivery Method): room air      CAPILLARY BLOOD GLUCOSE        I&O's Summary    27 Apr 2023 07:01  -  28 Apr 2023 07:00  --------------------------------------------------------  IN: 220 mL / OUT: 200 mL / NET: 20 mL          Appearance: Normal	  HEENT:   Normal oral mucosa, PERRL, EOMI	  Lymphatic: No lymphadenopathy  Cardiovascular: Normal S1 S2, No JVD  Respiratory: Lungs clear to auscultation	  Gastrointestinal:  Soft, Non-tender, + BS	  Skin: No rash, No ecchymoses	  Extremities:     LABS:                        8.8    10.22 )-----------( 232      ( 28 Apr 2023 06:32 )             27.0     04-28    133<L>  |  95<L>  |  42<H>  ----------------------------<  91  4.0   |  26  |  4.87<H>    Ca    8.2<L>      28 Apr 2023 06:29                              Consultant(s) Notes Reviewed:      Care Discussed with Consultants/Other Providers:

## 2023-04-28 NOTE — PROGRESS NOTE ADULT - SUBJECTIVE AND OBJECTIVE BOX
[facility] DIVISION OF KIDNEY DISEASES AND HYPERTENSION -- FOLLOW UP NOTE  --------------------------------------------------------------------------------  HPI: 77 year old male with PMH of Afib, CAD s/p CABG, BPH, HLD, HTN, and hypothyroidism who presented to the hospital on 3/25 with complaints of shortness of breath and wiley gain. The patient had been nonadherent with his diuretic regimen as outpatient and presented with acute on chronic decompensated heart failure. The patient was initiated on IV diuresis with significant improvement in volume status with approximately 15lb weight loss. Hospital course was further complicated by MSSA bacteremia requiring pacemaker extraction with micra implant and currently being managed with IVC abx with PICC line in place. The nephrology team was consulted for oliguric BEULAH. On review of [facility] pt noted to have a SCr of 1 on admission which since 4/1 has progressively increased to 9.66 initiated on HD 4/10. Last HD done on 4/26. Underwent kidney biopsy on 4/14- suggestive of interstitial disease likely pyelonephritis.     24 hour events/subjective:  Pt. seen and examined this morning. Denies any headaches, fevers/chills, chest pain, and abdominal pain. No fever.     PAST HISTORY  --------------------------------------------------------------------------------  No significant changes to PMH, PSH, FHx, SHx, unless otherwise noted    ALLERGIES & MEDICATIONS  --------------------------------------------------------------------------------  Allergies    Myrbetriq (Hives)  tamsulosin (Hives)  levofloxacin (Hives)  alfuzosin (Hives)    Intolerances    Standing Inpatient Medications  apixaban 5 milliGRAM(s) Oral two times a day  atorvastatin 40 milliGRAM(s) Oral at bedtime  chlorhexidine 4% Liquid 1 Application(s) Topical <User Schedule>  levothyroxine 137 MICROGram(s) Oral daily  metoprolol succinate  milliGRAM(s) Oral daily  midodrine. 10 milliGRAM(s) Oral three times a day  midodrine. 10 milliGRAM(s) Oral once  nystatin Powder 1 Application(s) Topical two times a day  senna 2 Tablet(s) Oral at bedtime    PRN Inpatient Medications  acetaminophen     Tablet .. 650 milliGRAM(s) Oral every 6 hours PRN  bisacodyl 5 milliGRAM(s) Oral every 12 hours PRN  oxyCODONE    IR 5 milliGRAM(s) Oral every 8 hours PRN  sodium chloride 0.9% lock flush 10 milliLiter(s) IV Push every 1 hour PRN    REVIEW OF SYSTEMS  --------------------------------------------------------------------------------  Gen: No fevers   Respiratory: No dyspnea  CV: No chest pain  GI: No abdominal pain, see HPI  : No dysuria  MSK: No  edema  Neuro: no dizziness   All other systems were reviewed and are negative, except as noted.    VITALS/PHYSICAL EXAM  --------------------------------------------------------------------------------  T(C): 36.4 (04-28-23 @ 04:52), Max: 36.8 (04-27-23 @ 17:08)  HR: 70 (04-28-23 @ 04:52) (67 - 75)  BP: 121/73 (04-28-23 @ 04:52) (114/68 - 122/74)  RR: 18 (04-28-23 @ 04:52) (18 - 19)  SpO2: 96% (04-28-23 @ 04:52) (94% - 96%)  Wt(kg): --    04-27-23 @ 07:01  -  04-28-23 @ 07:00  --------------------------------------------------------  IN: 220 mL / OUT: 200 mL / NET: 20 mL    Physical Exam:  	Gen: NAD  	HEENT: MMM  	Pulm: CTA B/L  	CV: S1S2  	Abd: Soft, +BS   	Ext: +LE edema B/L  	Neuro: Awake  	Skin: Warm and dry  	Vascular access: RIJ tunneled HD catheter+, R UE PICC line +    LABS/STUDIES  --------------------------------------------------------------------------------              8.8    10.22 >-----------<  232      [04-28-23 @ 06:32]              27.0     133  |  95  |  42  ----------------------------<  91      [04-28-23 @ 06:29]  4.0   |  26  |  4.87        Ca     8.2     [04-28-23 @ 06:29]    Creatinine Trend:  SCr 4.87 [04-28 @ 06:29]  SCr 4.01 [04-27 @ 07:09]  SCr 4.43 [04-25 @ 06:09]  SCr 5.84 [04-24 @ 07:05]  SCr 4.95 [04-23 @ 07:23]    Urinalysis - [04-13-23 @ 07:54]      Color DARK BROWN / Appearance Turbid / SG 1.026 / pH 6.5      Gluc 100 mg/dL / Ketone Trace  / Bili Negative / Urobili Negative       Blood Large / Protein 300 mg/dL / Leuk Est Large / Nitrite Negative      RBC >50 / WBC 3-5 / Hyaline 0-2 / Gran  / Sq Epi  / Non Sq Epi  / Bacteria Negative    Iron 29, TIBC 161, %sat 18      [04-27-23 @ 07:09]  PTH -- (Ca 7.7)      [04-27-23 @ 07:09]   59  Vitamin D (25OH) 36.6      [04-27-23 @ 07:09]    HBsAb <3.0      [04-21-23 @ 15:53]  HBsAb Nonreact      [04-21-23 @ 15:53]  HBsAg Nonreact      [04-21-23 @ 15:53]  HBcAb Nonreact      [04-21-23 @ 15:53]  HCV 0.16, Nonreact      [04-21-23 @ 15:53]  HIV Nonreact      [04-06-23 @ 11:32]    JAGRUTI: titer 1:160, pattern Homogeneous      [04-06-23 @ 11:29]  C3 Complement 126      [04-06-23 @ 11:47]  C4 Complement 25      [04-06-23 @ 11:47]  ANCA: cANCA Negative, pANCA Negative, atypical ANCA Negative      [04-06-23 @ 11:29]  anti-GBM <0.2      [04-06-23 @ 11:46]  PLA2R: BIANCA 4.2, IFA --      [04-06-23 @ 11:46]  Immunofixation Serum:   Weak IgA Lambda Band Identified    Reference Range: None Detected      [04-06-23 @ 11:47]  SPEP Interpretation: Weak Beta-Migrating Paraprotein Identified      [04-06-23 @ 11:47]

## 2023-04-28 NOTE — PHARMACOTHERAPY INTERVENTION NOTE - [facility]AR[facility]
February 15, 2021       Adia Reno MD  [address] [facility] [address]  Via In Basket      Patient: Goldie West   YOB: 1942   Date of Visit: 2/15/2021       Dear Dr. Reno:    I saw your patient, Goldie West, for an evaluation. Below are my notes for this visit with her.    If you have questions, please do not hesitate to call me.      Sincerely,        Antoine Mattson MD        CC: No Recipients  Antoine Mattson MD  2/15/2021 11:37 AM  Signed  Subjective   Patient ID: Goldie West is a 78 year old female.    Chief Complaint   Patient presents with   • Anemia       Referring doctor:  Adia Reno MD    HPI   78 year old female with anemia, DM, HLD, HTN who presents with anemia  Has a long hx of anemia x 10+ years  CBC 2018 - Hb 10s  3/2020 - Hb 9.6, MCV 80s , ferritin 16, iron 41, TIBC 360, 11% - placed on iron supplementation TID  Repeat Hb 12, ferritin 24 - normal iron studies now  Reports chronic, intermittent constipation - takes colace 100mg occasionally  Only takes colace 1x/wk, if takes too often, will have loose stool  Reports very infrequent BRBPR - mostly when wiping and after straining  No other changes in bowel habits, feels otherwise well  Did have EGD 2 years ago - possibly for anemia, unsure of result  Last colon 5+ years ago - normal  No wt loss, no changes in appetite  Reports occasional epigastric pain with her chronic GERD  Takes panto 40mg daily - controls symptoms  Neg fam hx of CRC    Past Medical History:   Diagnosis Date   • Diabetes (CMS/HCC)    • High cholesterol    • Hypertension        Past Surgical History:   Procedure Laterality Date   • Knee arthroscopy w/ laser Right    • Patient has a coronary artery stent         Family History   Problem Relation Age of Onset   • Heart disease Mother    • Heart disease Brother    • Cancer, Colon Neg Hx    • Cancer, Esophageal Neg Hx    • Cancer, Liver Neg Hx    • Cancer, Rectal Neg Hx    • Stomach Cancer Neg Hx        
      Social History  She reports that she has never smoked. She has never used smokeless tobacco. She reports that she does not drink alcohol or use drugs.    Current Outpatient Medications   Medication Sig   • metFORMIN (GLUCOPHAGE) 500 MG tablet TAKE 1 TABLET BY MOUTH TWICE DAILY   • amLODIPine (NORVASC) 2.5 MG tablet Take 1 tablet by mouth daily.   • Aspirin Low Dose 81 MG EC tablet TAKE 1 TABLET BY MOUTH DAILY AS DIRECTED   • carvedilol (COREG) 6.25 MG tablet TAKE 1 TABLET BY MOUTH TWICE DAILY WITH MEALS   • allopurinol (ZYLOPRIM) 100 MG tablet TAKE 1 TABLET BY MOUTH TWICE DAILY   • enalapril (VASOTEC) 5 MG tablet TAKE 1 TABLET BY MOUTH DAILY   • FeroSul 325 (65 Fe) MG tablet TAKE 1 TABLET BY MOUTH THREE TIMES DAILY   • atorvastatin (LIPITOR) 80 MG tablet TAKE 1 TABLET BY MOUTH AT BEDTIME   • Lancets (ONETOUCH DELICA PLUS [id number]) Misc CHECK BLOOD SUGAR ONCE DAILY   • meclizine (ANTIVERT) 25 MG tablet TAKE 1 TABLET BY MOUTH THREE TIMES DAILY AS NEEDED FOR DIZZINESS   • docusate sodium (COLACE) 100 MG capsule Take 1 capsule by mouth 2 times daily.   • folic acid (FOLATE) 1 MG tablet Take 1 tablet by mouth daily.   • nitroGLYcerin (NITROSTAT) 0.4 MG sublingual tablet Place 1 tablet under the tongue as needed for Chest pain.   • blood glucose test strip Test blood sugar one times daily as directed. Diagnosis: type 2 diabetes. Meter: precision   • Alcohol Swabs Pads Use to check blood sugar once daily   • blood glucose (ONE TOUCH ULTRA TEST) test strip Test blood sugar once daily.   • diclofenac (VOLTAREN) 1 % gel Apply 2 g topically 3 times daily as needed.   • polyethylene glycol-electrolytes (TriLyte) 420 g solution Take as directed by your provider.   • pantoprazole (PROTONIX) 40 MG tablet Take 1 tablet by mouth daily.     No current facility-administered medications for this visit.        Review of Systems   Constitutional: Negative for appetite change, chills, fatigue, fever and unexpected weight change. 
ASP - Dose optimization/Non-Renal dose adjustment
  HENT: Negative for trouble swallowing and voice change.    Respiratory: Negative for apnea, cough and shortness of breath.    Cardiovascular: Negative for chest pain.   Gastrointestinal: Positive for abdominal pain. Negative for blood in stool, constipation, diarrhea, nausea, rectal pain and vomiting.   Musculoskeletal: Negative for arthralgias.   Skin: Negative for pallor.   Neurological: Negative for dizziness and light-headedness.   Hematological: Does not bruise/bleed easily.   Psychiatric/Behavioral: The patient is not nervous/anxious.        Objective   Physical Exam   Constitutional: She is oriented to person, place, and time. She appears well-developed and well-nourished.   HENT:   Mouth/Throat: Oropharynx is clear and moist.   Eyes: Conjunctivae are normal.   Cardiovascular: Normal rate and regular rhythm.   Pulmonary/Chest: Effort normal and breath sounds normal.   Abdominal: Soft. Bowel sounds are normal. She exhibits no distension. There is no abdominal tenderness. There is no rebound.   Musculoskeletal:         General: No edema.   Neurological: She is alert and oriented to person, place, and time.   Skin: Skin is warm and dry.   Psychiatric: She has a normal mood and affect.   Nursing note and vitals reviewed.      Assessment   Problem List Items Addressed This Visit        Digestive    Epigastric pain       Hematologic & Lymphatic    Anemia - Primary     Has a long hx of anemia x 10+ years. CBC 2018 - Hb 10s. 3/2020 - Hb 9.6, MCV 80s , ferritin 16, iron 41, TIBC 360, 11% - placed on iron supplementation TID. Repeat Hb 12, ferritin 24 - normal iron studies now. Reports prior GI w/u for anemia - done at [facility]. Believes had EGD in 2018 - unknown result. Colonoscopy 5 years ago - normal. Unknown if she was ever told she had a GI cause of her anemia in the past. No wt loss, change in appetite. Minimal, infrequent BRBPR.     Plan:  - initial iron studies likely showing mixed MITZI and AoCD; but Hb 
ASP - Dose optimization/Non-Renal dose adjustment
ASP - Dose optimization/Non-Renal dose adjustment
normalized while on iron supplementation  - will plan for EGD/colonoscopy for further evaluation  - recommend to drop supplemental iron to only daily           Relevant Orders    COLONOSCOPY    EGD      Other Visit Diagnoses     Pre-procedural laboratory examination        Relevant Orders    2019 NOVEL CORONAVIRUS (SARS-COV-2)          Antoine Mattson MD  [facility]

## 2023-04-28 NOTE — PROGRESS NOTE ADULT - PROBLEM SELECTOR PLAN 1
Pt. with oliguric BEULAH. Differential also includes AIN perhaps in setting of Cefazolin? vs overdiuresis. On review of [facility] pt noted to have a Scr of 1 on admission which since 4/1 has progressively increased to 7 most recently. UA with blood and also proteinuria of 1.0g.       Serological work up negative except JAGRUTI positive with homogenous pattern. Renal sonogram demonstrating a horseshoe kidney without evidence of hydronephrosis or renal calculi. Pt underwent kidney biopsy by IR on 4/14. Kidney biopsy results showed primarily interstitial disease likely pyelonephritis.       Pt underwent first session HD on 4/10 via RIJ non tunneled HD catheter. RIJ tunneled HD catheter was placed by IR on 4/25. Last HD done on 4/26. Plan for HD again today. Pt with no signs of renal recovery for now, pt will need long term dialysis for now.     Iron studies reviewed, pt with CANDY. Start on iron sulfate tabs (would hold off IV iron considering pt has bacteremia).PTH WNL 59. Monitor labs and urine output. Avoid nephrotoxins. Dose medications as per eGFR.    If you have any questions, please feel free to contact me  Santos William  Nephrology Fellow  [phone]/ [employer] Teams(Preferred)  (After 5pm or on weekends please page the on-call fellow).

## 2023-04-29 LAB
ANION GAP SERPL CALC-SCNC: 11 MMOL/L — SIGNIFICANT CHANGE UP (ref 5–17)
BUN SERPL-MCNC: 31 MG/DL — HIGH (ref 7–23)
CALCIUM SERPL-MCNC: 8 MG/DL — LOW (ref 8.4–10.5)
CHLORIDE SERPL-SCNC: 96 MMOL/L — SIGNIFICANT CHANGE UP (ref 96–108)
CO2 SERPL-SCNC: 27 MMOL/L — SIGNIFICANT CHANGE UP (ref 22–31)
CREAT SERPL-MCNC: 4.17 MG/DL — HIGH (ref 0.5–1.3)
CULTURE RESULTS: SIGNIFICANT CHANGE UP
EGFR: 14 ML/MIN/1.73M2 — LOW
GLUCOSE SERPL-MCNC: 106 MG/DL — HIGH (ref 70–99)
HCT VFR BLD CALC: 27 % — LOW (ref 39–50)
HGB BLD-MCNC: 8.5 G/DL — LOW (ref 13–17)
MAGNESIUM SERPL-MCNC: 1.9 MG/DL — SIGNIFICANT CHANGE UP (ref 1.6–2.6)
MCHC RBC-ENTMCNC: 28.7 PG — SIGNIFICANT CHANGE UP (ref 27–34)
MCHC RBC-ENTMCNC: 31.5 GM/DL — LOW (ref 32–36)
MCV RBC AUTO: 91.2 FL — SIGNIFICANT CHANGE UP (ref 80–100)
NRBC # BLD: 0 /100 WBCS — SIGNIFICANT CHANGE UP (ref 0–0)
PHOSPHATE SERPL-MCNC: 4.4 MG/DL — SIGNIFICANT CHANGE UP (ref 2.5–4.5)
PLATELET # BLD AUTO: 209 K/UL — SIGNIFICANT CHANGE UP (ref 150–400)
POTASSIUM SERPL-MCNC: 3.7 MMOL/L — SIGNIFICANT CHANGE UP (ref 3.5–5.3)
POTASSIUM SERPL-SCNC: 3.7 MMOL/L — SIGNIFICANT CHANGE UP (ref 3.5–5.3)
RBC # BLD: 2.96 M/UL — LOW (ref 4.2–5.8)
RBC # FLD: 15.3 % — HIGH (ref 10.3–14.5)
SODIUM SERPL-SCNC: 134 MMOL/L — LOW (ref 135–145)
SPECIMEN SOURCE: SIGNIFICANT CHANGE UP
VANCOMYCIN FLD-MCNC: 18.6 UG/ML — SIGNIFICANT CHANGE UP
WBC # BLD: 10.9 K/UL — HIGH (ref 3.8–10.5)
WBC # FLD AUTO: 10.9 K/UL — HIGH (ref 3.8–10.5)

## 2023-04-29 RX ADMIN — CHLORHEXIDINE GLUCONATE 1 APPLICATION(S): 213 SOLUTION TOPICAL at 05:17

## 2023-04-29 RX ADMIN — Medication 100 MILLIGRAM(S): at 05:11

## 2023-04-29 RX ADMIN — Medication 325 MILLIGRAM(S): at 13:23

## 2023-04-29 RX ADMIN — MIDODRINE HYDROCHLORIDE 10 MILLIGRAM(S): 2.5 TABLET ORAL at 18:16

## 2023-04-29 RX ADMIN — MIDODRINE HYDROCHLORIDE 10 MILLIGRAM(S): 2.5 TABLET ORAL at 13:23

## 2023-04-29 RX ADMIN — SENNA PLUS 2 TABLET(S): 8.6 TABLET ORAL at 21:59

## 2023-04-29 RX ADMIN — APIXABAN 5 MILLIGRAM(S): 2.5 TABLET, FILM COATED ORAL at 05:11

## 2023-04-29 RX ADMIN — Medication 137 MICROGRAM(S): at 05:11

## 2023-04-29 RX ADMIN — ATORVASTATIN CALCIUM 40 MILLIGRAM(S): 80 TABLET, FILM COATED ORAL at 21:59

## 2023-04-29 RX ADMIN — MIDODRINE HYDROCHLORIDE 10 MILLIGRAM(S): 2.5 TABLET ORAL at 05:11

## 2023-04-29 RX ADMIN — NYSTATIN CREAM 1 APPLICATION(S): 100000 CREAM TOPICAL at 05:19

## 2023-04-29 RX ADMIN — APIXABAN 5 MILLIGRAM(S): 2.5 TABLET, FILM COATED ORAL at 18:18

## 2023-04-29 RX ADMIN — NYSTATIN CREAM 1 APPLICATION(S): 100000 CREAM TOPICAL at 18:19

## 2023-04-29 NOTE — PROGRESS NOTE ADULT - SUBJECTIVE AND OBJECTIVE BOX
Date of Service: 04-29-23 @ 10:25           CARDIOLOGY     PROGRESS  NOTE   ________________________________________________    CHIEF COMPLAINT:Patient is a 77y old  Male who presents with a chief complaint of CHF (27 Apr 2023 14:30)  no complain  	  REVIEW OF SYSTEMS:  CONSTITUTIONAL: No fever, weight loss, or fatigue  EYES: No eye pain, visual disturbances, or discharge  ENT:  No difficulty hearing, tinnitus, vertigo; No sinus or throat pain  NECK: No pain or stiffness  RESPIRATORY: No cough, wheezing, chills or hemoptysis; No Shortness of Breath  CARDIOVASCULAR: No chest pain, palpitations, passing out, dizziness, or leg swelling  GASTROINTESTINAL: No abdominal or epigastric pain. No nausea, vomiting, or hematemesis; No diarrhea or constipation. No melena or hematochezia.  GENITOURINARY: No dysuria, frequency, hematuria, or incontinence  NEUROLOGICAL: No headaches, memory loss, loss of strength, numbness, or tremors  SKIN: No itching, burning, rashes, or lesions   LYMPH Nodes: No enlarged glands  ENDOCRINE: No heat or cold intolerance; No hair loss  MUSCULOSKELETAL: No joint pain or swelling; No muscle, back, or extremity pain  PSYCHIATRIC: No depression, anxiety, mood swings, or difficulty sleeping  HEME/LYMPH: No easy bruising, or bleeding gums  ALLERGY AND IMMUNOLOGIC: No hives or eczema	    [x ] All others negative	  [ ] Unable to obtain    PHYSICAL EXAM:  T(C): 37 (04-29-23 @ 05:20), Max: 37 (04-29-23 @ 05:20)  HR: 84 (04-29-23 @ 05:20) (64 - 84)  BP: 120/76 (04-29-23 @ 05:20) (98/57 - 120/76)  RR: 18 (04-29-23 @ 05:20) (18 - 18)  SpO2: 95% (04-29-23 @ 05:20) (94% - 96%)  Wt(kg): --  I&O's Summary    28 Apr 2023 07:01  -  29 Apr 2023 07:00  --------------------------------------------------------  IN: 0 mL / OUT: 1050 mL / NET: -1050 mL        Appearance: Normal	  HEENT:   Normal oral mucosa, PERRL, EOMI	  Lymphatic: No lymphadenopathy  Cardiovascular: Normal S1 S2, No JVD, + murmurs, No edema  Respiratory:rhonchi  Psychiatry: A & O x 3, Mood & affect appropriate  Gastrointestinal:  Soft, Non-tender, + BS	  Skin: No rashes, No ecchymoses, No cyanosis	  Neurologic: Non-focal  Extremities: Normal range of motion, No clubbing, cyanosis or edema  Vascular: Peripheral pulses palpable 2+ bilaterally    MEDICATIONS  (STANDING):  apixaban 5 milliGRAM(s) Oral two times a day  atorvastatin 40 milliGRAM(s) Oral at bedtime  chlorhexidine 4% Liquid 1 Application(s) Topical <User Schedule>  ferrous    sulfate 325 milliGRAM(s) Oral daily  levothyroxine 137 MICROGram(s) Oral daily  metoprolol succinate  milliGRAM(s) Oral daily  midodrine. 10 milliGRAM(s) Oral three times a day  nystatin Powder 1 Application(s) Topical two times a day  senna 2 Tablet(s) Oral at bedtime      TELEMETRY: 	    ECG:  	  RADIOLOGY:  OTHER: 	  	  LABS:	 	    CARDIAC MARKERS:                                8.5    10.90 )-----------( 209      ( 29 Apr 2023 06:36 )             27.0     04-29    134<L>  |  96  |  31<H>  ----------------------------<  106<H>  3.7   |  27  |  4.17<H>    Ca    8.0<L>      29 Apr 2023 06:35  Phos  4.4     04-29  Mg     1.9     04-29      proBNP:   Lipid Profile:   HgA1c:   TSH:     Surgical Pathology Report (04.14.23 @ 11:00)    Surgical Pathology Report:   ACCESSION No:  [id number]  Patient:   JANEE ARCEO      Accession:                             [id number]    Collected Date/Time:                   4/14/2023 11:00 EDT  Received Date/Time:                    4/14/2023 11:12 EDT    Surgical Pathology Report - Auth (Verified)    Specimen(s) Submitted  Left kidney biopsy    Final Diagnosis    Kidney, needle core biopsy    This is a limited biopsy, with no well-preserved glomeruli present on the    sample submitted for immunofluorescence and electron microscopy. The  findings may not be representative    There are isolated subepithelial, occasional intramembranous and  subendothelial, and few mesangial electron dense deposits; the background  staining of the tissue is marginally stronger for lambda light chains  when compared to lambda light chain, see comment  - Differential diagnosis include: 1) post infectious glomerulitis; 2)  monoclonal  immunoglobulin deposition disease with lambda restriction; and 3)  an autoimmune  process    Severe acute pyelonephritis  - Acute interstitial nephritis, with a neutrophil-rich infiltrate and  numerous neutrophilic  casts ("pus casts")  - Acute tubular injury with focal necrosis    Summary of chronic changes:  - Moderate to severe global glomerulosclerosis (38% of glomeruli) and  hypoperfusion  (15% of glomeruli)  - Moderate to severe tubular atrophy and interstitial fibrosis  - Moderate arterial and arteriolar sclerosis    Comment:  The preliminary findings were discussed with Dr.R. Valerio on 04/17/2023  at 12:30PM.    This kidney biopsy reveals   a severe acute pyelonephritis, and numberous  c irculating neutrophils and mononuclear inflammatory cells in the  glomerular capillary lumens. The latter finding is uncommen in  pyelonephritis, thus a superimposed glomerulitis should be considered.  Electron microscopy examination performed on paraffin-embedded tissue  reveals occasional subepithelial "hump-like" electron dense deposits  and  occasional intramembranous and subendothelial, and few mesangial  electron dense deposits  . However, immunofluorescence examination was  performed on paraffin-embedded tissue, which restricted the assessment  of the characteristics of the deposits. Given the clinical information  of an IgA-lambda spike on SPEP/IFX, the MSSA infection history, and the  existence of electron densities along the tubular basement membranes and  deposits in the mesangium, the differential diagnosis includes: 1) post  infectious glomerulitis; 2) monoclonal immunoglobulin deposition disease  with lambda restriction; and 3) an autoimmune process.    Verified by: Gurdeep Rivera MD  (Electronic Signature)  Reported on: 04/26/23 18:59 EDT, [facility], [address] [place] [id number]  _________________________________________________________________    Microscopic Description    1. Light Microscopy:    Sections of formalin-fixed, paraffin embedded tissue were evaluated using  H&E, PAS, JMS, and trichrome stains. An H&E-stained frozen section taken  from the tissue allocated for immunofluorescence microscopy and semi-  thin toluidine blue-stained epoxy sections of the tissue processed for  electron microscopy were also evaluated using light microscopy.    The sample submitted for light microscopy consists of renal cortex and  medulla, with up to 8 glomeruli. The entire biopsy contains 13 glomeruli    (LM-8; IF-0; EM-5), of which 5 are globally sclerosed. 2 glomeruli  are hypoperfused. No basement membrane spikes, craters, or double  contours are seen in the glomeruli. Cellular crescents are not seen  in glomeruli. Many capillary loops contain circulating mononuclear and  polymorphonuclear inflammatory cells. The mesangium is mildly expanded  by extracellular matrix and increased numbers of mesangial cells. The  sample reveals diffuse interstitial inflammation, associated with patchy  interstitial hemorrhage, mild interstitial edema, and mild to moderate  tubulitis. The infiltrates are composed of mostly neutrophils, and  admixed lymphocytes and plasma cells. Tubular epithelial cells reveal  focal microvesicular degeneration. Several tubules contain necrotic  cellular debris, granular casts, and   neutrophilic casts (pus casts)  PAS-positive hyaline casts, and red blood cell casts. Many atrophied  tubules contain PAS positive hyaline casts.  Obvious signs of a viral  cytopathic effect are not seen in the sample. Peritubular capillaries  contain many circulating mononuclear and polymorphonuclear inflammatory  cells. Scattered foci of Tamm-Horsfall protein inspissation are present  in the interstitium. Arteries and arterioles show moderate sclerosis.    2. Immunofluorescence Microscopy:    The sections of the sample submitted for immunofluorescence studies were  incubated with antibodies specific for the heavy chains of IgG, IgA, and  IgM, for kappa and lambda light chains, fibrin, albumin, and complement  components C3 and C1q. The intensity of immunofluorescence reactivity is  expressed on a scale 1  -4+.    The sample contains medulla only; glomeruli are not present. Tubular  basement membranes show diffuse fine granular deposition of C3 (1+).  Tubules contain many intraluminal casts reactive for polyclonal IgA.  The interstitium reveals scattered fibrin deposits. The    background  staining of the tissue is marginally stronger for lambda light chains  when compared to lambda light chain.   There is no difference in  reactivity  between kappa and lambda light chains in the tubular casts.    Paraffin sections were pretreated with protease solutions for antigen  retrieval and incubated with antibodies specific for the heavy chains of  IgG, IgA, and IgM, for kappa and lambda light chains, and for complement  components C3 and C1q. The intensity of immunofluorescence reactivity is  expressed on a scale 1  -4+.    The  background staining of the tissue is marginally stronger for lambda  light chains when compared to lambda light chain.    There is fine  granular  reactivity for IgG (trace), IgA (trace), C3 (trace), kappa LC (trace) and  lambda LC (trace to 1+) both along the capillaries and in the mesangium.  There is no difference in reactivity between kappa and lambda light  chains in the tubular casts.    3. Electron Microscopy:    The sample submitted for electron microscopy examination contains  5 glomeruli; 3 glomeruli are globally sclerosed; 2 glomeruli are  hypoperfused; 1 hypoperfused glomerulus is examined ultrastructurally.  The glomerular visceral epithelial cellsreveal mild segmental effacement  of their foot processes. The glomerular basement membranes are diffusedly  wrinkled. Capillary loops reveal the presence of one single subepithelial    and rare and sparse intramembranous electron dense deposits. The  deposits are finely granular. The endothelial cells show focal swelling.  Tubuloreticular inclusions are not seen within the cytoplasm of  glomerular endothelial cells. Most capillary lumens are occluded by the  presence of inflammatory cells. The mesangium reveals normal cellular  elements and a normal amount of matrix. No electron dense deposits are  present in the mesangium. Tubular basement membranes reveal no electron  dense deposits.    The sample originally submitted for light microscopy was retrieved from  the paraffin block and reprocessed for ultrastructural examination;  it contains 4/2 glomeruli. 2 glomeruli are globally sclerosed. Both  viable glomeruli are examined ultrastructurally. The glomerular  visceral epithelial cells reveal mild segmental effacement of their foot  processes. Capillary loops reveal the presence of rare subepithelial,  occasional intramembranous, and rare and sparse subendothelial electron  dense deposits. The deposits are finely granular with no unequivocal  substructures. The endothelial cells show focal swelling. Tubuloreticular  inclusions are not seen within the cytoplasm of glomerular endothelial  cells. Most capillary lumens are occluded by the presence of inflammatory  cells. The mesangiumreveals normal cellular elements and a normal amount  of matrix. Few electron dense deposits are present in the mesangium.  Tubular basement membranes also reveal focal electron densities.    Clinical Information  77-year-old man with PMH of Afib, CAD s/p CABG, BPH, HLD, HTN, and  hypothyroidism who presented to the hospital on 3/25 with complaints  of shortness of breath. He was initially on aggressive IV diuretics  and volume status improved.  Hospital course was further complicated  by MSSA bacteremia and initiated on IV cefazolin. His renal function on  presentation was normal, but starting 4/1 creatinein rapidly increased  from 1.27mg/dL to 9.66mg/dL on 4/10. He was subsequently started on  dialysis. All of his serological work up was essentially negative except  for a weak IgA lambda spike and positive JAGRUTI with homogenous pattern  (1:160). Urine studies showed spot urine TP/CR ratio of 1.0.      Gross Description  Received:  In formalin labeled  "left renal biopsy"  FFPE (formalin): Three cores      Size: Ranging from 0.4 cm to 0.9 cm  ,  1.0  cm  Description:  Tan-brown, soft tissue  IF (Fer 's): One core       Size: 0.6 cm  Description:  Tan, soft tissue  EM:  One core     Size:  0.2 cm  Description:  Tan, soft tissue  Submitted: FFPE  Entirely in one cassette: LORELEI Adams 04/14/2023 12:32 PM    Disclaimer  In addition to other data that may appear on the specimen containers, all  labels have been inspected to confirm the presence of the patient's name  anddate of birth.    Histological processing is performed at [facility],  Department of Pathology, [address] [place] [address].  [place] [address].  Immunofluorescence tests have been developed and their performance  characteristics determined by [facility], Department  of Pathology, [address] [place] [address]. The tests have  not been cleared or approved by the U.S.Food and Drug Administration;  the FDA has determined that such clearance or approval is not necessary.  These tests are used for clinical purposes.  Electron microscopy technical [place] [address].    Vancomycin Level, Random (04.29.23 @ 06:36)    Vancomycin Level, Random: 18.6: The "VANCOMYCIN, RANDOM" test should only be ordered for patients with  severe renal dysfunction or on dialysis. Therapeutic ranges have not been  established and results must be interpreted in the context of patient's  clinical condition.  NOTE: Therapeutic range for Trough Vancomycin is 10-20 mcg/mL. ug/mL        Assessment and plan  ---------------------------  77y m pmh BPH, Chronic venous insufficiency, HLD Hypothyroidism, S/P CABG x 2, S/P mitral valve replacement, SP Status post angioplasty, Sp TKR rt, Anasarca Pt comes to ed c/o shortness of breath for past month w progressive worsening of anasarca w swelling of scrotum and diff urinating, Has been noncompliant on diuretics for past two weeks w 20lb weight gain over past month. No fever and chills, sputum, nvdc, cp. PE Adult male lying stretcher w maked swelling to josh lower extr, w pitting edema,  chest  scant josh crackles w slight wheeze and prolonged exp phase  chf acute on chronic sec to RV dysfunction  tele, AFib hr controlled  +BC i bottle, MSSA , s/p ppm explant  s/p PPM and LEAD extraction  acute on chronic renal failure/ renal appreciated  acute renal failure/ hematuria/ac was held awaiting renal eval/urology called awaiting ct abdomen and pelvis has ordered since last night awaiting results  beta blocker sed to rate control and hx of cad, s/p CABG  vanco level noted  cardiomyopathy can not tolerate ACE/ARB  a.fib hr is well controlled  will add midodrine specially if needs hd  may consider to re start on Eliquis, will hold for now consider dc planning as out pt HD, dc Bumex  dc planning as pt is scheduled for out pt HD/ shiley dialysis cathete rstart on midodrine 10 mg tid awaiting   awaiting pathology/ onc appreciated  dc planning  fu vanco level noted

## 2023-04-29 NOTE — PROGRESS NOTE ADULT - ASSESSMENT
77  year old male    h/o  CAD s/p stent , asa.      A-fib/ PPM, , hypothyroid, and total left knee replacement    prior  PPM  interrogation  with  [facility]/   s/p  brief   bursts  of  afib/ , on prior visit   Ct chest, retrosternal hematoma.  mod left pl effusion      c/c leg  pain         *    admitted   for  worsening   pedal  edema          component  of  cellulitis   and  from  c/c  diastolic  chf,/  h/o  l/edema  of L  leg         pt  admits to  being  non complaint with  meds        *   h/o    c/c AFIB,   has  PPM         on eliquis     *    Anemia, , hb stable, had  been seen by  gi  eval dr joann sanchez in past     *    CAD,     cath, with 2 vessel disease,  s/p  CABG and  MVR  surg d r marni   *      h/o  Afib on    eliquis               c/c leg redness/ had  4 +  edema, on keflex, has  improved    *     echo,  on 7/2022,  ef  35/ new/ severe  TR          cath,   was non  obstructive      *   MSSA  bacterinia           was  on iv ancef.  ,  rpt bcx    are  negative          explant pf  ppm  and  Micra  placement on  3/30/23. dr sunitha rocha   *    Echo,  ef  25,  mod  AI.  severe  TR          PICC  line  and  extended  course  of  ab.         BEULAH, ,  ?  AIN,  hence   ab was  switched   to iv vanco,  per  ID         Afib.,              on eliquis          BEULAH,   crt   was  9,9, now  on HD                     s/p  renal bx on 4/ 14.. 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 + , Ig A  Lamda.  / herminia dobson                  may  need  bone marrow  bx  to  r/o plasma cell  dyscrasia .  awiat  heme  f/p              card /  dr mikayla dick     rad< from: TTE with Doppler (w/Cont) (07.05.22 @ 09:42) >  Conclusions:  Endocardial visualization enhanced with intravenous  injection of Ultrasonic Enhancing Agent (Definity).  Severe left ventricular enlargement.  Estimated ejection fraction 35%. Diffuse hypokinesis, with  inferior akinesis.  Bioprosthetic mitral valve with normal function.  Right ventricular enlargement with decreased right  ventricular systolic function.  A device wire is noted in the right heart.  ------------------------------------------------------------------------  Confirmed on  7/5/2022 - 12:16:10 by Kristian    < end of copied text >                                  77  year old male    h/o  CAD s/p stent , asa.      A-fib/ PPM, , hypothyroid, and total left knee replacement    prior  PPM  interrogation  with  [facility]/   s/p  brief   bursts  of  afib/ , on prior visit   Ct chest, retrosternal hematoma.  mod left pl effusion      c/c leg  pain         *    admitted   for  worsening   pedal  edema          component  of  cellulitis   and  from  c/c  diastolic  chf,/  h/o  l/edema  of L  leg         pt  admits to  being  non complaint with  meds        *   h/o    c/c AFIB,   has  PPM         on eliquis     *    Anemia, , hb stable, had  been seen by  gi  eval dr joann sanchez in past     *    CAD,     cath, with 2 vessel disease,  s/p  CABG and  MVR  surg d r marni   *      h/o  Afib on    eliquis               c/c leg redness/ had  4 +  edema, on keflex, has  improved    *     echo,  on 7/2022,  ef  35/ new/ severe  TR          cath,   was non  obstructive      *   MSSA  bacterinia           was  on iv ancef.  ,  rpt bcx    are  negative          explant pf  ppm  and  Micra  placement on  3/30/23. dr sunitha rocha   *    Echo,  ef  25,  mod  AI.  severe  TR          PICC  line  and  extended  course  of  ab.         BEULAH, ,  ?  AIN,  hence   ab was  switched   to iv vanco,  per  ID         Afib.,              on eliquis          BEULAH,   crt   was  9,9, now  on HD                     s/p  renal bx on 4/ 14.. 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 + , Ig A  Lamda.  / heme  eval  dr dobson             per heme, unlikely to need  bone marrow  bx              card /  dr mikayla dick     rad< from: TTE with Doppler (w/Cont) (07.05.22 @ 09:42) >  Conclusions:  Endocardial visualization enhanced with intravenous  injection of Ultrasonic Enhancing Agent (Definity).  Severe left ventricular enlargement.  Estimated ejection fraction 35%. Diffuse hypokinesis, with  inferior akinesis.  Bioprosthetic mitral valve with normal function.  Right ventricular enlargement with decreased right  ventricular systolic function.  A device wire is noted in the right heart.  ------------------------------------------------------------------------  Confirmed on  7/5/2022 - 12:16:10 by Kristian    < end of copied text >

## 2023-04-29 NOTE — PROGRESS NOTE ADULT - SUBJECTIVE AND OBJECTIVE BOX
date of service: 04-29-23 @ 08:55  afberile  REVIEW OF SYSTEMS:  CONSTITUTIONAL: No fever,  no  weight loss  ENT:  No  tinnitus,   no   vertigo  NECK: No pain or stiffness  RESPIRATORY: No cough, wheezing, chills or hemoptysis;    No Shortness of Breath  CARDIOVASCULAR: No chest pain, palpitations, dizziness  GASTROINTESTINAL: No abdominal or epigastric pain. No nausea, vomiting, or hematemesis; No diarrhea  No melena or hematochezia.  GENITOURINARY: No dysuria, frequency, hematuria, or incontinence  NEUROLOGICAL: No headaches  SKIN: No itching,  no   rash  LYMPH Nodes: No enlarged glands  ENDOCRINE: No heat or cold intolerance  MUSCULOSKELETAL: No joint pain or swelling  PSYCHIATRIC: No depression, anxiety  HEME/LYMPH: No easy bruising, or bleeding gums  ALLERGY AND IMMUNOLOGIC: No hives or eczema	    MEDICATIONS  (STANDING):  apixaban 5 milliGRAM(s) Oral two times a day  atorvastatin 40 milliGRAM(s) Oral at bedtime  chlorhexidine 4% Liquid 1 Application(s) Topical <User Schedule>  ferrous    sulfate 325 milliGRAM(s) Oral daily  levothyroxine 137 MICROGram(s) Oral daily  metoprolol succinate  milliGRAM(s) Oral daily  midodrine. 10 milliGRAM(s) Oral three times a day  nystatin Powder 1 Application(s) Topical two times a day  senna 2 Tablet(s) Oral at bedtime    MEDICATIONS  (PRN):  acetaminophen     Tablet .. 650 milliGRAM(s) Oral every 6 hours PRN Mild Pain (1 - 3)  bisacodyl 5 milliGRAM(s) Oral every 12 hours PRN Constipation  oxyCODONE    IR 5 milliGRAM(s) Oral every 8 hours PRN Moderate Pain (4 - 6)  sodium chloride 0.9% lock flush 10 milliLiter(s) IV Push every 1 hour PRN Pre/post blood products, medications, blood draw, and to maintain line patency      Vital Signs Last 24 Hrs  T(C): 37 (29 Apr 2023 05:20), Max: 37 (29 Apr 2023 05:20)  T(F): 98.6 (29 Apr 2023 05:20), Max: 98.6 (29 Apr 2023 05:20)  HR: 84 (29 Apr 2023 05:20) (54 - 84)  BP: 120/76 (29 Apr 2023 05:20) (98/57 - 120/76)  BP(mean): --  RR: 18 (29 Apr 2023 05:20) (18 - 18)  SpO2: 95% (29 Apr 2023 05:20) (94% - 96%)    Parameters below as of 29 Apr 2023 05:20  Patient On (Oxygen Delivery Method): nasal cannula      CAPILLARY BLOOD GLUCOSE        I&O's Summary    28 Apr 2023 07:01  -  29 Apr 2023 07:00  --------------------------------------------------------  IN: 0 mL / OUT: 1050 mL / NET: -1050 mL          Appearance: Normal	  HEENT:   Normal oral mucosa, PERRL, EOMI	  Lymphatic: No lymphadenopathy  Cardiovascular: Normal S1 S2, No JVD  Respiratory: Lungs clear to auscultation	  Gastrointestinal:  Soft, Non-tender, + BS	  Skin: No rash, No ecchymoses	  Extremities:     LABS:                        8.5    10.90 )-----------( 209      ( 29 Apr 2023 06:36 )             27.0     04-29    134<L>  |  96  |  31<H>  ----------------------------<  106<H>  3.7   |  27  |  4.17<H>    Ca    8.0<L>      29 Apr 2023 06:35  Phos  4.4     04-29  Mg     1.9     04-29                              Consultant(s) Notes Reviewed:      Care Discussed with Consultants/Other Providers:

## 2023-04-30 LAB
HCT VFR BLD CALC: 26.2 % — LOW (ref 39–50)
HGB BLD-MCNC: 8.2 G/DL — LOW (ref 13–17)
MCHC RBC-ENTMCNC: 29.1 PG — SIGNIFICANT CHANGE UP (ref 27–34)
MCHC RBC-ENTMCNC: 31.3 GM/DL — LOW (ref 32–36)
MCV RBC AUTO: 92.9 FL — SIGNIFICANT CHANGE UP (ref 80–100)
NRBC # BLD: 0 /100 WBCS — SIGNIFICANT CHANGE UP (ref 0–0)
PLATELET # BLD AUTO: 210 K/UL — SIGNIFICANT CHANGE UP (ref 150–400)
RBC # BLD: 2.82 M/UL — LOW (ref 4.2–5.8)
RBC # FLD: 15.4 % — HIGH (ref 10.3–14.5)
VANCOMYCIN FLD-MCNC: 16 UG/ML — SIGNIFICANT CHANGE UP
WBC # BLD: 11.47 K/UL — HIGH (ref 3.8–10.5)
WBC # FLD AUTO: 11.47 K/UL — HIGH (ref 3.8–10.5)

## 2023-04-30 RX ADMIN — CHLORHEXIDINE GLUCONATE 1 APPLICATION(S): 213 SOLUTION TOPICAL at 08:11

## 2023-04-30 RX ADMIN — MIDODRINE HYDROCHLORIDE 10 MILLIGRAM(S): 2.5 TABLET ORAL at 06:00

## 2023-04-30 RX ADMIN — Medication 100 MILLIGRAM(S): at 06:00

## 2023-04-30 RX ADMIN — APIXABAN 5 MILLIGRAM(S): 2.5 TABLET, FILM COATED ORAL at 06:00

## 2023-04-30 RX ADMIN — APIXABAN 5 MILLIGRAM(S): 2.5 TABLET, FILM COATED ORAL at 17:43

## 2023-04-30 RX ADMIN — Medication 137 MICROGRAM(S): at 05:59

## 2023-04-30 RX ADMIN — Medication 325 MILLIGRAM(S): at 17:42

## 2023-04-30 RX ADMIN — ATORVASTATIN CALCIUM 40 MILLIGRAM(S): 80 TABLET, FILM COATED ORAL at 21:50

## 2023-04-30 RX ADMIN — NYSTATIN CREAM 1 APPLICATION(S): 100000 CREAM TOPICAL at 17:42

## 2023-04-30 RX ADMIN — MIDODRINE HYDROCHLORIDE 10 MILLIGRAM(S): 2.5 TABLET ORAL at 12:47

## 2023-04-30 RX ADMIN — NYSTATIN CREAM 1 APPLICATION(S): 100000 CREAM TOPICAL at 06:00

## 2023-04-30 RX ADMIN — MIDODRINE HYDROCHLORIDE 10 MILLIGRAM(S): 2.5 TABLET ORAL at 17:42

## 2023-04-30 NOTE — PROGRESS NOTE ADULT - ASSESSMENT
77  year old male    h/o  CAD s/p stent , asa.      A-fib/ PPM, , hypothyroid, and total left knee replacement    prior  PPM  interrogation  with  WCT/   s/p  brief   bursts  of  afib/ , on prior visit   Ct chest, retrosternal hematoma.  mod left pl effusion      c/c leg  pain         *    admitted   for  worsening   pedal  edema          component  of  cellulitis   and  from  c/c  diastolic  chf,/  h/o  l/edema  of L  leg         pt  admits to  being  non complaint with  meds        *   h/o    c/c AFIB,   has  PPM         on eliquis     *    Anemia, , hb stable, had  been seen by  gi  eval dr joann sanchez in past     *    CAD,     cath, with 2 vessel disease,  s/p  CABG and  MVR  surg d r marni   *      h/o  Afib on    eliquis               c/c leg redness/ had  4 +  edema, on keflex, has  improved    *     echo,  on 7/2022,  ef  35/ new/ severe  TR          cath,   was non  obstructive      *   MSSA  bacterinia           was  on iv ancef.  ,  rpt bcx    are  negative          explant pf  ppm  and  Micra  placement on  3/30/23. dr sunitha rocha   *    Echo,  ef  25,  mod  AI.  severe  TR          PICC  line  and  extended  course  of  ab.         BEULAH, ,  ?  AIN,  hence   ab was  switched   to iv vanco,  per  ID         Afib.,              on eliquis          BEULAH,   crt   was  9,9, now  on HD                     s/p  renal bx on 4/ 14.. 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 + , Ig A  Lamda.  / heme  eval  dr dobson             per heme, unlikely to need  bone marrow  bx        hD  per  r enal.  d/c planning  in progress             card /  dr mikayla dick     rad< from: TTE with Doppler (w/Cont) (07.05.22 @ 09:42) >  Conclusions:  Endocardial visualization enhanced with intravenous  injection of Ultrasonic Enhancing Agent (Definity).  Severe left ventricular enlargement.  Estimated ejection fraction 35%. Diffuse hypokinesis, with  inferior akinesis.  Bioprosthetic mitral valve with normal function.  Right ventricular enlargement with decreased right  ventricular systolic function.  A device wire is noted in the right heart.  ------------------------------------------------------------------------  Confirmed on  7/5/2022 - 12:16:10 by Kristian    < end of copied text >

## 2023-04-30 NOTE — PROGRESS NOTE ADULT - SUBJECTIVE AND OBJECTIVE BOX
date of service: 04-30-23 @ 07:23  afberile  REVIEW OF SYSTEMS:  CONSTITUTIONAL: No fever,  no  weight loss  ENT:  No  tinnitus,   no   vertigo  NECK: No pain or stiffness  RESPIRATORY: No cough, wheezing, chills or hemoptysis;    No Shortness of Breath  CARDIOVASCULAR: No chest pain, palpitations, dizziness  GASTROINTESTINAL: No abdominal or epigastric pain. No nausea, vomiting, or hematemesis; No diarrhea  No melena or hematochezia.  GENITOURINARY: No dysuria, frequency, hematuria, or incontinence  NEUROLOGICAL: No headaches  SKIN: No itching,  no   rash  LYMPH Nodes: No enlarged glands  ENDOCRINE: No heat or cold intolerance  MUSCULOSKELETAL: No joint pain or swelling  PSYCHIATRIC: No depression, anxiety  HEME/LYMPH: No easy bruising, or bleeding gums  ALLERGY AND IMMUNOLOGIC: No hives or eczema	    MEDICATIONS  (STANDING):  apixaban 5 milliGRAM(s) Oral two times a day  atorvastatin 40 milliGRAM(s) Oral at bedtime  chlorhexidine 4% Liquid 1 Application(s) Topical <User Schedule>  ferrous    sulfate 325 milliGRAM(s) Oral daily  levothyroxine 137 MICROGram(s) Oral daily  metoprolol succinate  milliGRAM(s) Oral daily  midodrine. 10 milliGRAM(s) Oral three times a day  nystatin Powder 1 Application(s) Topical two times a day  senna 2 Tablet(s) Oral at bedtime    MEDICATIONS  (PRN):  acetaminophen     Tablet .. 650 milliGRAM(s) Oral every 6 hours PRN Mild Pain (1 - 3)  bisacodyl 5 milliGRAM(s) Oral every 12 hours PRN Constipation  sodium chloride 0.9% lock flush 10 milliLiter(s) IV Push every 1 hour PRN Pre/post blood products, medications, blood draw, and to maintain line patency      Vital Signs Last 24 Hrs  T(C): 36.7 (30 Apr 2023 04:27), Max: 37.1 (29 Apr 2023 20:32)  T(F): 98.1 (30 Apr 2023 04:27), Max: 98.7 (29 Apr 2023 20:32)  HR: 81 (30 Apr 2023 04:27) (76 - 81)  BP: 111/64 (30 Apr 2023 04:27) (107/56 - 111/69)  BP(mean): --  RR: 18 (30 Apr 2023 04:27) (18 - 18)  SpO2: 95% (30 Apr 2023 04:27) (94% - 95%)    Parameters below as of 30 Apr 2023 04:27  Patient On (Oxygen Delivery Method): nasal cannula  O2 Flow (L/min): 2    CAPILLARY BLOOD GLUCOSE        I&O's Summary    29 Apr 2023 07:01  -  30 Apr 2023 07:00  --------------------------------------------------------  IN: 120 mL / OUT: 50 mL / NET: 70 mL          Appearance: Normal	  HEENT:   Normal oral mucosa, PERRL, EOMI	  Lymphatic: No lymphadenopathy  Cardiovascular: Normal S1 S2, No JVD  Respiratory: Lungs clear to auscultation	  Gastrointestinal:  Soft, Non-tender, + BS	  Skin: No rash, No ecchymoses	  Extremities:     LABS:                        8.5    10.90 )-----------( 209      ( 29 Apr 2023 06:36 )             27.0     04-29    134<L>  |  96  |  31<H>  ----------------------------<  106<H>  3.7   |  27  |  4.17<H>    Ca    8.0<L>      29 Apr 2023 06:35  Phos  4.4     04-29  Mg     1.9     04-29                              Consultant(s) Notes Reviewed:      Care Discussed with Consultants/Other Providers:

## 2023-04-30 NOTE — PROGRESS NOTE ADULT - SUBJECTIVE AND OBJECTIVE BOX
Date of Service: 04-30-23 @ 08:27           CARDIOLOGY     PROGRESS  NOTE   ________________________________________________    CHIEF COMPLAINT:Patient is a 77y old  Male who presents with a chief complaint of CHF (27 Apr 2023 14:30)  no complain  	  REVIEW OF SYSTEMS:  CONSTITUTIONAL: No fever, weight loss, or fatigue  EYES: No eye pain, visual disturbances, or discharge  ENT:  No difficulty hearing, tinnitus, vertigo; No sinus or throat pain  NECK: No pain or stiffness  RESPIRATORY: No cough, wheezing, chills or hemoptysis; No Shortness of Breath  CARDIOVASCULAR: No chest pain, palpitations, passing out, dizziness, or leg swelling  GASTROINTESTINAL: No abdominal or epigastric pain. No nausea, vomiting, or hematemesis; No diarrhea or constipation. No melena or hematochezia.  GENITOURINARY: No dysuria, frequency, hematuria, or incontinence  NEUROLOGICAL: No headaches, memory loss, loss of strength, numbness, or tremors  SKIN: No itching, burning, rashes, or lesions   LYMPH Nodes: No enlarged glands  ENDOCRINE: No heat or cold intolerance; No hair loss  MUSCULOSKELETAL: No joint pain or swelling; No muscle, back, or extremity pain  PSYCHIATRIC: No depression, anxiety, mood swings, or difficulty sleeping  HEME/LYMPH: No easy bruising, or bleeding gums  ALLERGY AND IMMUNOLOGIC: No hives or eczema	    [x ] All others negative	  [ ] Unable to obtain    PHYSICAL EXAM:  T(C): 36.7 (04-30-23 @ 04:27), Max: 37.1 (04-29-23 @ 20:32)  HR: 81 (04-30-23 @ 04:27) (76 - 81)  BP: 111/64 (04-30-23 @ 04:27) (107/56 - 111/69)  RR: 18 (04-30-23 @ 04:27) (18 - 18)  SpO2: 95% (04-30-23 @ 04:27) (94% - 95%)  Wt(kg): --  I&O's Summary    29 Apr 2023 07:01  -  30 Apr 2023 07:00  --------------------------------------------------------  IN: 120 mL / OUT: 50 mL / NET: 70 mL        Appearance: Normal	  HEENT:   Normal oral mucosa, PERRL, EOMI	  Lymphatic: No lymphadenopathy  Cardiovascular: Normal S1 S2, No JVD, + murmurs, No edema  Respiratory: rhonchi  Gastrointestinal:  Soft, Non-tender, + BS	  Skin: No rashes, No ecchymoses, No cyanosis	  Neurologic: Non-focal  Extremities: Normal range of motion, No clubbing, cyanosis or edema  Vascular: Peripheral pulses palpable 2+ bilaterally    MEDICATIONS  (STANDING):  apixaban 5 milliGRAM(s) Oral two times a day  atorvastatin 40 milliGRAM(s) Oral at bedtime  chlorhexidine 4% Liquid 1 Application(s) Topical <User Schedule>  ferrous    sulfate 325 milliGRAM(s) Oral daily  levothyroxine 137 MICROGram(s) Oral daily  metoprolol succinate  milliGRAM(s) Oral daily  midodrine. 10 milliGRAM(s) Oral three times a day  nystatin Powder 1 Application(s) Topical two times a day  senna 2 Tablet(s) Oral at bedtime      TELEMETRY: 	    ECG:  	  RADIOLOGY:  OTHER: 	  	  LABS:	 	    CARDIAC MARKERS:                        8.2    11.47 )-----------( 210      ( 30 Apr 2023 07:13 )             26.2     04-29    134<L>  |  96  |  31<H>  ----------------------------<  106<H>  3.7   |  27  |  4.17<H>    Ca    8.0<L>      29 Apr 2023 06:35  Phos  4.4     04-29  Mg     1.9     04-29      proBNP:   Lipid Profile:   HgA1c:   TSH:     1.  ARF--now HD dependent.  Orders reviewed in detail with fellow, AVI RN.  Biopsy reviewed in detail with primarily interstitial disease c/w PYELONEPHRITIS emphasizing need for prolonged antibiotic rx. HOWEVER new IGA lambda may be significant (although hemeonc indicates unlikely) with addl features post infectious GN Check PTH, VitD  2.  Hypotension on HD--improving.  HD orders reviewed with fellow, HD RN  3.  Pyelonephritis--by biopsy.  PROLONGED  ANTIBIOTICS.  HAS PICC  discussed with med team    Assessment and plan  ---------------------------  77y m pmh BPH, Chronic venous insufficiency, HLD Hypothyroidism, S/P CABG x 2, S/P mitral valve replacement, SP Status post angioplasty, Sp TKR rt, Anasarca Pt comes to ed c/o shortness of breath for past month w progressive worsening of anasarca w swelling of scrotum and diff urinating, Has been noncompliant on diuretics for past two weeks w 20lb weight gain over past month. No fever and chills, sputum, nvdc, cp. PE Adult male lying stretcher w maked swelling to josh lower extr, w pitting edema,  chest  scant josh crackles w slight wheeze and prolonged exp phase  chf acute on chronic sec to RV dysfunction  tele, AFib hr controlled  +BC i bottle, MSSA , s/p ppm explant  s/p PPM and LEAD extraction  acute on chronic renal failure/ renal appreciated  acute renal failure/ hematuria/ac was held awaiting renal eval/urology called awaiting ct abdomen and pelvis has ordered since last night awaiting results  beta blocker sed to rate control and hx of cad, s/p CABG  vanco level noted  cardiomyopathy can not tolerate ACE/ARB  a.fib hr is well controlled  will add midodrine specially if needs hd  may consider to re start on Eliquis, will hold for now consider dc planning as out pt HD, dc Bumex  dc planning as pt is scheduled for out pt HD/ shiley dialysis cathete rstart on midodrine 10 mg tid awaiting   awaiting pathology/ onc appreciated  dc planning  fu vanco level noted/ nephro noted

## 2023-05-01 ENCOUNTER — TRANSCRIPTION ENCOUNTER (OUTPATIENT)
Age: 78
End: 2023-05-01

## 2023-05-01 VITALS
TEMPERATURE: 97 F | HEART RATE: 79 BPM | WEIGHT: 232.59 LBS | OXYGEN SATURATION: 98 % | SYSTOLIC BLOOD PRESSURE: 110 MMHG | DIASTOLIC BLOOD PRESSURE: 67 MMHG | RESPIRATION RATE: 18 BRPM

## 2023-05-01 LAB
CREATININE, URINE RESULT: 245 MG/DL — SIGNIFICANT CHANGE UP
HAV IGM SER-ACNC: SIGNIFICANT CHANGE UP
HBV CORE IGM SER-ACNC: SIGNIFICANT CHANGE UP
HBV SURFACE AG SER-ACNC: SIGNIFICANT CHANGE UP
HCT VFR BLD CALC: 26.2 % — LOW (ref 39–50)
HCV AB S/CO SERPL IA: 0.16 S/CO — SIGNIFICANT CHANGE UP (ref 0–0.99)
HCV AB SERPL-IMP: SIGNIFICANT CHANGE UP
HGB BLD-MCNC: 8.3 G/DL — LOW (ref 13–17)
MCHC RBC-ENTMCNC: 29.1 PG — SIGNIFICANT CHANGE UP (ref 27–34)
MCHC RBC-ENTMCNC: 31.7 GM/DL — LOW (ref 32–36)
MCV RBC AUTO: 91.9 FL — SIGNIFICANT CHANGE UP (ref 80–100)
NRBC # BLD: 0 /100 WBCS — SIGNIFICANT CHANGE UP (ref 0–0)
PLATELET # BLD AUTO: 202 K/UL — SIGNIFICANT CHANGE UP (ref 150–400)
PROT ?TM UR-MCNC: 1096 MG/DL — HIGH (ref 0–12)
PROT ?TM UR-MCNC: 1096 MG/DL — HIGH (ref 0–12)
RBC # BLD: 2.85 M/UL — LOW (ref 4.2–5.8)
RBC # FLD: 15.3 % — HIGH (ref 10.3–14.5)
SARS-COV-2 RNA SPEC QL NAA+PROBE: SIGNIFICANT CHANGE UP
VANCOMYCIN FLD-MCNC: 16.4 UG/ML — SIGNIFICANT CHANGE UP
WBC # BLD: 11.02 K/UL — HIGH (ref 3.8–10.5)
WBC # FLD AUTO: 11.02 K/UL — HIGH (ref 3.8–10.5)

## 2023-05-01 PROCEDURE — 84300 ASSAY OF URINE SODIUM: CPT

## 2023-05-01 PROCEDURE — 86803 HEPATITIS C AB TEST: CPT

## 2023-05-01 PROCEDURE — 82962 GLUCOSE BLOOD TEST: CPT

## 2023-05-01 PROCEDURE — 87086 URINE CULTURE/COLONY COUNT: CPT

## 2023-05-01 PROCEDURE — 87641 MR-STAPH DNA AMP PROBE: CPT

## 2023-05-01 PROCEDURE — 82306 VITAMIN D 25 HYDROXY: CPT

## 2023-05-01 PROCEDURE — 83521 IG LIGHT CHAINS FREE EACH: CPT

## 2023-05-01 PROCEDURE — 87150 DNA/RNA AMPLIFIED PROBE: CPT

## 2023-05-01 PROCEDURE — 81001 URINALYSIS AUTO W/SCOPE: CPT

## 2023-05-01 PROCEDURE — 87186 SC STD MICRODIL/AGAR DIL: CPT

## 2023-05-01 PROCEDURE — 87075 CULTR BACTERIA EXCEPT BLOOD: CPT

## 2023-05-01 PROCEDURE — 86923 COMPATIBILITY TEST ELECTRIC: CPT

## 2023-05-01 PROCEDURE — 88346 IMFLUOR 1ST 1ANTB STAIN PX: CPT

## 2023-05-01 PROCEDURE — C1751: CPT

## 2023-05-01 PROCEDURE — 87389 HIV-1 AG W/HIV-1&-2 AB AG IA: CPT

## 2023-05-01 PROCEDURE — 80074 ACUTE HEPATITIS PANEL: CPT

## 2023-05-01 PROCEDURE — 97110 THERAPEUTIC EXERCISES: CPT

## 2023-05-01 PROCEDURE — 84156 ASSAY OF PROTEIN URINE: CPT

## 2023-05-01 PROCEDURE — 82436 ASSAY OF URINE CHLORIDE: CPT

## 2023-05-01 PROCEDURE — 97116 GAIT TRAINING THERAPY: CPT

## 2023-05-01 PROCEDURE — 88350 IMFLUOR EA ADDL 1ANTB STN PX: CPT

## 2023-05-01 PROCEDURE — 93971 EXTREMITY STUDY: CPT

## 2023-05-01 PROCEDURE — 86255 FLUORESCENT ANTIBODY SCREEN: CPT

## 2023-05-01 PROCEDURE — C1752: CPT

## 2023-05-01 PROCEDURE — 86038 ANTINUCLEAR ANTIBODIES: CPT

## 2023-05-01 PROCEDURE — 85018 HEMOGLOBIN: CPT

## 2023-05-01 PROCEDURE — 82784 ASSAY IGA/IGD/IGG/IGM EACH: CPT

## 2023-05-01 PROCEDURE — 93308 TTE F-UP OR LMTD: CPT

## 2023-05-01 PROCEDURE — C1894: CPT

## 2023-05-01 PROCEDURE — 84166 PROTEIN E-PHORESIS/URINE/CSF: CPT

## 2023-05-01 PROCEDURE — 84484 ASSAY OF TROPONIN QUANT: CPT

## 2023-05-01 PROCEDURE — 86334 IMMUNOFIX E-PHORESIS SERUM: CPT

## 2023-05-01 PROCEDURE — C1769: CPT

## 2023-05-01 PROCEDURE — C8929: CPT

## 2023-05-01 PROCEDURE — C9399: CPT

## 2023-05-01 PROCEDURE — 74176 CT ABD & PELVIS W/O CONTRAST: CPT

## 2023-05-01 PROCEDURE — 71046 X-RAY EXAM CHEST 2 VIEWS: CPT

## 2023-05-01 PROCEDURE — 85025 COMPLETE CBC W/AUTO DIFF WBC: CPT

## 2023-05-01 PROCEDURE — 87116 MYCOBACTERIA CULTURE: CPT

## 2023-05-01 PROCEDURE — 84295 ASSAY OF SERUM SODIUM: CPT

## 2023-05-01 PROCEDURE — 83880 ASSAY OF NATRIURETIC PEPTIDE: CPT

## 2023-05-01 PROCEDURE — 85520 HEPARIN ASSAY: CPT

## 2023-05-01 PROCEDURE — 82550 ASSAY OF CK (CPK): CPT

## 2023-05-01 PROCEDURE — 87206 SMEAR FLUORESCENT/ACID STAI: CPT

## 2023-05-01 PROCEDURE — 86036 ANCA SCREEN EACH ANTIBODY: CPT

## 2023-05-01 PROCEDURE — 84155 ASSAY OF PROTEIN SERUM: CPT

## 2023-05-01 PROCEDURE — 88348 ELECTRON MICROSCOPY DX: CPT

## 2023-05-01 PROCEDURE — 83516 IMMUNOASSAY NONANTIBODY: CPT

## 2023-05-01 PROCEDURE — 83605 ASSAY OF LACTIC ACID: CPT

## 2023-05-01 PROCEDURE — 80048 BASIC METABOLIC PNL TOTAL CA: CPT

## 2023-05-01 PROCEDURE — 88305 TISSUE EXAM BY PATHOLOGIST: CPT

## 2023-05-01 PROCEDURE — 82570 ASSAY OF URINE CREATININE: CPT

## 2023-05-01 PROCEDURE — 77001 FLUOROGUIDE FOR VEIN DEVICE: CPT

## 2023-05-01 PROCEDURE — 97162 PT EVAL MOD COMPLEX 30 MIN: CPT

## 2023-05-01 PROCEDURE — 82803 BLOOD GASES ANY COMBINATION: CPT

## 2023-05-01 PROCEDURE — P9040: CPT

## 2023-05-01 PROCEDURE — 76937 US GUIDE VASCULAR ACCESS: CPT

## 2023-05-01 PROCEDURE — 82330 ASSAY OF CALCIUM: CPT

## 2023-05-01 PROCEDURE — 76942 ECHO GUIDE FOR BIOPSY: CPT

## 2023-05-01 PROCEDURE — 86704 HEP B CORE ANTIBODY TOTAL: CPT

## 2023-05-01 PROCEDURE — 82310 ASSAY OF CALCIUM: CPT

## 2023-05-01 PROCEDURE — 84133 ASSAY OF URINE POTASSIUM: CPT

## 2023-05-01 PROCEDURE — 86706 HEP B SURFACE ANTIBODY: CPT

## 2023-05-01 PROCEDURE — 87015 SPECIMEN INFECT AGNT CONCNTJ: CPT

## 2023-05-01 PROCEDURE — 99285 EMERGENCY DEPT VISIT HI MDM: CPT | Mod: 25

## 2023-05-01 PROCEDURE — 82728 ASSAY OF FERRITIN: CPT

## 2023-05-01 PROCEDURE — 0225U NFCT DS DNA&RNA 21 SARSCOV2: CPT

## 2023-05-01 PROCEDURE — 82565 ASSAY OF CREATININE: CPT

## 2023-05-01 PROCEDURE — 85027 COMPLETE CBC AUTOMATED: CPT

## 2023-05-01 PROCEDURE — 71045 X-RAY EXAM CHEST 1 VIEW: CPT

## 2023-05-01 PROCEDURE — 83970 ASSAY OF PARATHORMONE: CPT

## 2023-05-01 PROCEDURE — U0005: CPT

## 2023-05-01 PROCEDURE — C1786: CPT

## 2023-05-01 PROCEDURE — 85014 HEMATOCRIT: CPT

## 2023-05-01 PROCEDURE — 86160 COMPLEMENT ANTIGEN: CPT

## 2023-05-01 PROCEDURE — 83540 ASSAY OF IRON: CPT

## 2023-05-01 PROCEDURE — 74018 RADEX ABDOMEN 1 VIEW: CPT

## 2023-05-01 PROCEDURE — 80053 COMPREHEN METABOLIC PANEL: CPT

## 2023-05-01 PROCEDURE — 82607 VITAMIN B-12: CPT

## 2023-05-01 PROCEDURE — 87102 FUNGUS ISOLATION CULTURE: CPT

## 2023-05-01 PROCEDURE — 82947 ASSAY GLUCOSE BLOOD QUANT: CPT

## 2023-05-01 PROCEDURE — 93005 ELECTROCARDIOGRAM TRACING: CPT

## 2023-05-01 PROCEDURE — 87635 SARS-COV-2 COVID-19 AMP PRB: CPT

## 2023-05-01 PROCEDURE — 99261: CPT

## 2023-05-01 PROCEDURE — 99232 SBSQ HOSP IP/OBS MODERATE 35: CPT | Mod: GC

## 2023-05-01 PROCEDURE — 76000 FLUOROSCOPY <1 HR PHYS/QHP: CPT

## 2023-05-01 PROCEDURE — 86850 RBC ANTIBODY SCREEN: CPT

## 2023-05-01 PROCEDURE — 83735 ASSAY OF MAGNESIUM: CPT

## 2023-05-01 PROCEDURE — 84132 ASSAY OF SERUM POTASSIUM: CPT

## 2023-05-01 PROCEDURE — 85610 PROTHROMBIN TIME: CPT

## 2023-05-01 PROCEDURE — 36556 INSERT NON-TUNNEL CV CATH: CPT

## 2023-05-01 PROCEDURE — 76770 US EXAM ABDO BACK WALL COMP: CPT

## 2023-05-01 PROCEDURE — 84100 ASSAY OF PHOSPHORUS: CPT

## 2023-05-01 PROCEDURE — 87040 BLOOD CULTURE FOR BACTERIA: CPT

## 2023-05-01 PROCEDURE — 83550 IRON BINDING TEST: CPT

## 2023-05-01 PROCEDURE — 87070 CULTURE OTHR SPECIMN AEROBIC: CPT

## 2023-05-01 PROCEDURE — 96374 THER/PROPH/DIAG INJ IV PUSH: CPT

## 2023-05-01 PROCEDURE — 83935 ASSAY OF URINE OSMOLALITY: CPT

## 2023-05-01 PROCEDURE — C2629: CPT

## 2023-05-01 PROCEDURE — 82435 ASSAY OF BLOOD CHLORIDE: CPT

## 2023-05-01 PROCEDURE — C1773: CPT

## 2023-05-01 PROCEDURE — 36569 INSJ PICC 5 YR+ W/O IMAGING: CPT

## 2023-05-01 PROCEDURE — 88313 SPECIAL STAINS GROUP 2: CPT

## 2023-05-01 PROCEDURE — 36430 TRANSFUSION BLD/BLD COMPNT: CPT

## 2023-05-01 PROCEDURE — 73590 X-RAY EXAM OF LOWER LEG: CPT

## 2023-05-01 PROCEDURE — 36415 COLL VENOUS BLD VENIPUNCTURE: CPT

## 2023-05-01 PROCEDURE — 84165 PROTEIN E-PHORESIS SERUM: CPT

## 2023-05-01 PROCEDURE — C1750: CPT

## 2023-05-01 PROCEDURE — 87640 STAPH A DNA AMP PROBE: CPT

## 2023-05-01 PROCEDURE — 85730 THROMBOPLASTIN TIME PARTIAL: CPT

## 2023-05-01 PROCEDURE — 87340 HEPATITIS B SURFACE AG IA: CPT

## 2023-05-01 PROCEDURE — 87799 DETECT AGENT NOS DNA QUANT: CPT

## 2023-05-01 PROCEDURE — 97530 THERAPEUTIC ACTIVITIES: CPT

## 2023-05-01 PROCEDURE — 80202 ASSAY OF VANCOMYCIN: CPT

## 2023-05-01 PROCEDURE — 36558 INSERT TUNNELED CV CATH: CPT

## 2023-05-01 PROCEDURE — 86900 BLOOD TYPING SEROLOGIC ABO: CPT

## 2023-05-01 PROCEDURE — U0003: CPT

## 2023-05-01 PROCEDURE — 50200 RENAL BIOPSY PERQ: CPT

## 2023-05-01 PROCEDURE — 86901 BLOOD TYPING SEROLOGIC RH(D): CPT

## 2023-05-01 RX ORDER — VANCOMYCIN HCL 1 G
500 VIAL (EA) INTRAVENOUS ONCE
Refills: 0 | Status: COMPLETED | OUTPATIENT
Start: 2023-05-01 | End: 2023-05-01

## 2023-05-01 RX ORDER — CILOSTAZOL 100 MG/1
1 TABLET ORAL
Qty: 0 | Refills: 0 | DISCHARGE

## 2023-05-01 RX ORDER — MIDODRINE HYDROCHLORIDE 2.5 MG/1
1 TABLET ORAL
Qty: 0 | Refills: 0 | DISCHARGE
Start: 2023-05-01

## 2023-05-01 RX ORDER — VANCOMYCIN HCL 1 G
500 VIAL (EA) INTRAVENOUS
Qty: 5 | Refills: 0
Start: 2023-05-01 | End: 2023-05-11

## 2023-05-01 RX ADMIN — APIXABAN 5 MILLIGRAM(S): 2.5 TABLET, FILM COATED ORAL at 05:21

## 2023-05-01 RX ADMIN — Medication 137 MICROGRAM(S): at 06:35

## 2023-05-01 RX ADMIN — Medication 100 MILLIGRAM(S): at 12:12

## 2023-05-01 RX ADMIN — MIDODRINE HYDROCHLORIDE 10 MILLIGRAM(S): 2.5 TABLET ORAL at 12:13

## 2023-05-01 RX ADMIN — CHLORHEXIDINE GLUCONATE 1 APPLICATION(S): 213 SOLUTION TOPICAL at 07:25

## 2023-05-01 RX ADMIN — MIDODRINE HYDROCHLORIDE 10 MILLIGRAM(S): 2.5 TABLET ORAL at 05:21

## 2023-05-01 RX ADMIN — Medication 100 MILLIGRAM(S): at 05:20

## 2023-05-01 RX ADMIN — Medication 325 MILLIGRAM(S): at 12:13

## 2023-05-01 RX ADMIN — NYSTATIN CREAM 1 APPLICATION(S): 100000 CREAM TOPICAL at 05:21

## 2023-05-01 RX ADMIN — MIDODRINE HYDROCHLORIDE 10 MILLIGRAM(S): 2.5 TABLET ORAL at 17:41

## 2023-05-01 RX ADMIN — APIXABAN 5 MILLIGRAM(S): 2.5 TABLET, FILM COATED ORAL at 17:41

## 2023-05-01 NOTE — PROGRESS NOTE ADULT - ASSESSMENT
{\rtf1\rmngxu26854\ansi\kawwdls5858\ftnbj\uc1\deff0  {\fonttbl{\f0 \fnil Segoe UI;}{\f1 \fnil \fcharset0 Segoe UI;}{\f2 \fnil Times [employer] [place];}}  {\colortbl ;\lym193\zbing415\ygub015 ;\red0\green0\blue0 ;\red0\green0\blue0 ;}  {\stylesheet{\f0\fs20 Normal;}{\cs1 Default Paragraph Font;}{\cs2\f0\fs16 Line Number;}{\cs3\f2\fs24 Hyperlink;}}  {\*\revtbl{Unknown;}}  \rqmqaf49499\iitxje56615\snohp4567\jjucf0291\vuhdx5904\grhgg0121\hieypvz046\hxziudo019\nogrowautofit\dpmghj803\formshade\nofeaturethrottle1\dntblnsbdb\fet4\aendnotes\aftnnrlc\pgbrdrhead\pgbrdrfoot  \sectd\bdyulx93023\mlhatm26700\guttersxn0\dbscwbxv1902\gcfdlzdj0704\nugkmzob5685\qaeyhgbc9606\kwybfut934\hlbktqc519\sbkpage\pgncont\pgndec  \plain\plain\f0\fs24\ql\plain\f0\fs24\plain\f1\fs16\pfmp1633\hich\f1\dbch\f1\loch\f1\cf2\fs16 76 y/o male PMHx HTN, HLD, CAD s/p stent on ASA, A-fib, hypothyroid, CHF  presented 3/25/23 with worsening SOB, DUNCAN, peripheral edema, scrotal edema and 20 pound   unintentional weight gain over a month. Patient reported non compliance with diuretics as he had doctors appts to attend to and did not want. Patient was experiencing difficulty voiding 2/2 scrotal edema. Denied CP, cough, fever, N/V/D,\par  The patient was initiated on IV diuresis with significant improvement in volume status with approximately 15lb weight loss. Hospital course was further complicated by MSSA bacteremia requiring pacemaker extraction with micra implant, being managed with   IVC abx with PICC line in place for 6 weeks, ERENDIRA negative for vegetations. Patient developed oliguric BEULAH. Initiated on HD 4/10/23.  Underwent kidney biopsy on 4/14/23\par  Seen for monoclonal protein and anemia\par  Patient has weak IgA lambda on immunofixation, cannot quantify.\par  Free light chain ratio serum normal\par  Immunoglobulin panel pending. UPEP and immunofixation urine\par  \sb240\sa240\plain\f0\fs24\plain\f1\fs16\zdha7633\hich\f1\dbch\f1\loch\f1\cf2\fs16 Pathology report from kidney is not diagnostic and gives broad differential- Differential diagnosis include: 1) post infectious glomerulitis; 2) \par  monoclonal immunoglobulin deposition disease with lambda restriction; and 3) an autoimmune process \par  Severe acute pyelonephritis - Acute interstitial nephritis, with a neutrophil-rich infiltrate and numerous neutrophilic \par  casts ("pus casts") - Acute tubular injury with focal necrosis \par  T\plain\f1\fs24 hus it is hard to make dx of it is hard to make dx and treat it like LCDD. Will discuss with pathologist. So far we do not have significant paraproteinemia in serum and patholgoy report has broad diff\par  UPEP is pending. No further hem evaluation planned unless UPEP demands\par   He has multiple reasons for anemia, inflammation, kidney failure, blood \par  \sb0\sa0\plain\f0\fs24\plain\f1\fs24 draws etc.  PRBC can be used in the mean time as needed. EPO as per renal.\par  \par  \plain\f1\fs16\rkbz9254\hich\f1\dbch\f1\loch\f1\cf2\fs16\par  \par  \par  \plain\f1\fs16\apji9557\hich\f1\dbch\f1\loch\f1\cf2\fs16\strike\plain\f1\fs16\rgly3890\hich\f1\dbch\f1\loch\f1\cf2\fs16\plain\f1\fs16\gzfk2314\hich\f1\dbch\f1\loch\f1\cf2\fs16\par  \plain\f0\fs20\ytqo8596\hich\f0\dbch\f0\loch\f0\fs20\par  }   78y/o male PMHx HTN, HLD, CAD s/p stent on ASA, A-fib, hypothyroid, CHF  presented 3/25/23 with worsening SOB, DUNCAN, peripheral edema, scrotal edema and 20 pound unintentional weight gain over a month. Patient reported non compliance with diuretics as he had doctors appts to attend to and did not want. Patient was experiencing difficulty voiding 2/2 scrotal edema. Denied CP, cough, fever, N/V/D,  The patient was initiated on IV diuresis with significant improvement in volume status with approximately 15lb weight loss. Hospital course was further complicated by MSSA bacteremia requiring pacemaker extraction with micra implant, being managed with IVC abx with PICC line in place for 6 weeks, ERENDIRA negative for vegetations. Patient developed oliguric BEULAH. Initiated on HD 4/10/23.  Underwent kidney biopsy on 4/14/23  Seen for monoclonal protein and anemia  Patient has weak IgA lambda on immunofixation, cannot quantify.  Free light chain ratio serum normal  Immunoglobulin panel pending. UPEP and immunofixation urine  Pathology report from kidney is not diagnostic and gives broad differential- Differential diagnosis include: 1) post infectious glomerulitis; 2)   monoclonal immunoglobulin deposition disease with lambda restriction; and 3) an autoimmune process   Severe acute pyelonephritis - Acute interstitial nephritis, with a neutrophil-rich infiltrate and numerous neutrophilic   casts ("pus casts") - Acute tubular injury with focal necrosis   Thus it is hard to make dx of it is hard to make dx and treat it like LCDD. Will discuss with pathologist. So far we do not have significant paraproteinemia in serum and pathology report has broad diff  UPEP is pending. No further hem evaluation planned unless UPEP demands   He has multiple reasons for anemia, inflammation, kidney failure, blood   draws etc.  PRBC can be used in the mean time as needed. EPO as per renal.

## 2023-05-01 NOTE — PROGRESS NOTE ADULT - PROVIDER SPECIALTY LIST ADULT
Cardiology
Electrophysiology
Internal Medicine
Nephrology
Cardiology
Electrophysiology
Infectious Disease
Internal Medicine
Intervent Radiology
Nephrology
Electrophysiology
Electrophysiology
Heme/Onc
Internal Medicine
Nephrology
Infectious Disease

## 2023-05-01 NOTE — PROGRESS NOTE ADULT - ATTENDING SUPERVISION STATEMENT
Student
Fellow
Student
Fellow

## 2023-05-01 NOTE — PROGRESS NOTE ADULT - PROBLEM SELECTOR PROBLEM 1
BEULAH (acute kidney injury)
MSSA bacteremia

## 2023-05-01 NOTE — CHART NOTE - NSCHARTNOTESELECT_GEN_ALL_CORE
Event Note
Event Note
ID
no need for fistula
Event Note
ID/Event Note
ID/Event Note
[employer]

## 2023-05-01 NOTE — DISCHARGE NOTE NURSING/CASE MANAGEMENT/SOCIAL WORK - NSDCPEFALRISK_GEN_ALL_CORE
For information on Fall & Injury Prevention, visit: [email or web address] OR  [email or web address] OR  https://www.cdc.gov/steadi/patient.html

## 2023-05-01 NOTE — PROGRESS NOTE ADULT - ASSESSMENT
77  year old male    h/o  CAD s/p stent , asa.      A-fib/ PPM, , hypothyroid, and total left knee replacement    prior  PPM  interrogation  with  WCT/   s/p  brief   bursts  of  afib/ , on prior visit   Ct chest, retrosternal hematoma.  mod left pl effusion      c/c leg  pain         *    admitted   for  worsening   pedal  edema          component  of  cellulitis   and  from  c/c  diastolic  chf,/  h/o  l/edema  of L  leg         pt  admits to  being  non complaint with  meds        *   h/o    c/c AFIB,   has  PPM         on eliquis     *    Anemia, , hb stable, had  been seen by  gi  eval dr joann sanchez in past     *    CAD,     cath, with 2 vessel disease,  s/p  CABG and  MVR  surg d r marni   *      h/o  Afib on    eliquis               c/c leg redness/ had  4 +  edema, on keflex, has  improved    *     echo,  on 7/2022,  ef  35/ new/ severe  TR          cath,   was non  obstructive      *   MSSA  bacterinia           was  on iv ancef.  ,  rpt bcx    are  negative          explant pf  ppm  and  Micra  placement on  3/30/23. dr sunitha rocha   *    Echo,  ef  25,  mod  AI.  severe  TR          PICC  line  and  extended  course  of  ab.         BEULAH, ,  ?  AIN,  hence   ab was  switched   to iv vanco,  per  ID         Afib.,              on eliquis          BEULAH,   crt   was  9,9, now  on HD                     s/p  renal bx on 4/ 14.. 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 + , Ig A  Lamda.  / heme  eval  dr dobson             per heme, unlikely to need  bone marrow  bx             d/c planning  in progress/  to rehab             card /  dr mikayla dick     rad< from: TTE with Doppler (w/Cont) (07.05.22 @ 09:42) >  Conclusions:  Endocardial visualization enhanced with intravenous  injection of Ultrasonic Enhancing Agent (Definity).  Severe left ventricular enlargement.  Estimated ejection fraction 35%. Diffuse hypokinesis, with  inferior akinesis.  Bioprosthetic mitral valve with normal function.  Right ventricular enlargement with decreased right  ventricular systolic function.  A device wire is noted in the right heart.  ------------------------------------------------------------------------  Confirmed on  7/5/2022 - 12:16:10 by Kristian    < end of copied text >

## 2023-05-01 NOTE — PROGRESS NOTE ADULT - SUBJECTIVE AND OBJECTIVE BOX
[facility] Division of Kidney Diseases & Hypertension  FOLLOW UP NOTE  [phone]--------------------------------------------------------------------------------    Chief Complaint: BEULAH    HPI: 77 year old male with PMH of Afib, CAD s/p CABG, BPH, HLD, HTN, and hypothyroidism who presented to the hospital on 3/25 with complaints of shortness of breath and wiley gain. The patient had been nonadherent with his diuretic regimen as outpatient and presented with acute on chronic decompensated heart failure. The patient was initiated on IV diuresis with significant improvement in volume status with approximately 15lb weight loss. Hospital course was further complicated by MSSA bacteremia requiring pacemaker extraction with micra implant and currently being managed with IVC abx with PICC line in place. The nephrology team was consulted for oliguric BEULAH. On review of [facility] pt noted to have a SCr of 1 on admission which since 4/1 has progressively increased to 9.66 initiated on HD 4/10. Last HD done on 4/26. Underwent kidney biopsy on 4/14- suggestive of interstitial disease likely pyelonephritis.     24 hour events/subjective:  Pt. seen and examined this morning. Denies any headaches, fevers/chills, chest pain, and abdominal pain. No fever.     PAST HISTORY  --------------------------------------------------------------------------------  No significant changes to PMH, PSH, FHx, SHx, unless otherwise noted    ALLERGIES & MEDICATIONS  --------------------------------------------------------------------------------  Allergies    Myrbetriq (Hives)  tamsulosin (Hives)  levofloxacin (Hives)  alfuzosin (Hives)    Intolerances      Standing Inpatient Medications  apixaban 5 milliGRAM(s) Oral two times a day  atorvastatin 40 milliGRAM(s) Oral at bedtime  chlorhexidine 4% Liquid 1 Application(s) Topical <User Schedule>  ferrous    sulfate 325 milliGRAM(s) Oral daily  levothyroxine 137 MICROGram(s) Oral daily  metoprolol succinate  milliGRAM(s) Oral daily  midodrine. 10 milliGRAM(s) Oral three times a day  nystatin Powder 1 Application(s) Topical two times a day  senna 2 Tablet(s) Oral at bedtime    PRN Inpatient Medications  acetaminophen     Tablet .. 650 milliGRAM(s) Oral every 6 hours PRN  bisacodyl 5 milliGRAM(s) Oral every 12 hours PRN  sodium chloride 0.9% lock flush 10 milliLiter(s) IV Push every 1 hour PRN      REVIEW OF SYSTEMS  --------------------------------------------------------------------------------  Gen: No fevers   Respiratory: No dyspnea  CV: No chest pain  GI: No abdominal pain, see HPI  : No dysuria  MSK: No  edema  Neuro: no dizziness   All other systems were reviewed and are negative, except as noted.      VITALS/PHYSICAL EXAM  --------------------------------------------------------------------------------  T(C): 36.2 (05-01-23 @ 11:40), Max: 36.7 (04-30-23 @ 20:08)  HR: 79 (05-01-23 @ 11:40) (79 - 82)  BP: 110/67 (05-01-23 @ 11:40) (100/60 - 117/73)  RR: 18 (05-01-23 @ 11:40) (18 - 18)  SpO2: 98% (05-01-23 @ 11:40) (93% - 98%)  Wt(kg): --        04-30-23 @ 07:01  -  05-01-23 @ 07:00  --------------------------------------------------------  IN: 670 mL / OUT: 110 mL / NET: 560 mL    05-01-23 @ 07:01  -  05-01-23 @ 12:32  --------------------------------------------------------  IN: 800 mL / OUT: 2300 mL / NET: -1500 mL      Physical Exam:  	Gen: NAD  	HEENT: MMM  	Pulm: CTA B/L  	CV: S1S2  	Abd: Soft, +BS   	Ext: +LE edema B/L  	Neuro: Awake  	Skin: Warm and dry  	Vascular access: RIJ tunneled HD catheter+, R UE PICC line +    LABS/STUDIES  --------------------------------------------------------------------------------              8.3    11.02 >-----------<  202      [05-01-23 @ 07:04]              26.2                 Creatinine Trend:  SCr 4.17 [04-29 @ 06:35]  SCr 4.87 [04-28 @ 06:29]  SCr 4.01 [04-27 @ 07:09]  SCr 4.43 [04-25 @ 06:09]  SCr 5.84 [04-24 @ 07:05]      Urine Protein 1096      [04-30-23 @ 13:23]    Iron 29, TIBC 161, %sat 18      [04-27-23 @ 07:09]  Ferritin 438      [04-28-23 @ 06:31]  PTH -- (Ca 7.7)      [04-27-23 @ 07:09]   59  Vitamin D (25OH) 36.6      [04-27-23 @ 07:09]      Free Light Chains: kappa 25.56, lambda 25.37, ratio = 1.01      [04-28 @ 06:31]

## 2023-05-01 NOTE — DISCHARGE NOTE NURSING/CASE MANAGEMENT/SOCIAL WORK - PATIENT PORTAL LINK FT
You can access the FollowMyHealth Patient Portal offered by [facility] by registering at the following website: [email or web address]. By joining [facility]’s FollowMyHealth portal, you will also be able to view your health information using other applications (apps) compatible with our system.

## 2023-05-01 NOTE — PROGRESS NOTE ADULT - SUBJECTIVE AND OBJECTIVE BOX
date of service: 05-01-23 @ 09:00  afebrile  REVIEW OF SYSTEMS:  CONSTITUTIONAL: No fever,  no  weight loss  ENT:  No  tinnitus,   no   vertigo  NECK: No pain or stiffness  RESPIRATORY: No cough, wheezing, chills or hemoptysis;    No Shortness of Breath  CARDIOVASCULAR: No chest pain, palpitations, dizziness  GASTROINTESTINAL: No abdominal or epigastric pain. No nausea, vomiting, or hematemesis; No diarrhea  No melena or hematochezia.  GENITOURINARY: No dysuria, frequency, hematuria, or incontinence  NEUROLOGICAL: No headaches  SKIN: No itching,  no   rash  LYMPH Nodes: No enlarged glands  ENDOCRINE: No heat or cold intolerance  MUSCULOSKELETAL: No joint pain or swelling  PSYCHIATRIC: No depression, anxiety  HEME/LYMPH: No easy bruising, or bleeding gums  ALLERGY AND IMMUNOLOGIC: No hives or eczema	    MEDICATIONS  (STANDING):  apixaban 5 milliGRAM(s) Oral two times a day  atorvastatin 40 milliGRAM(s) Oral at bedtime  chlorhexidine 4% Liquid 1 Application(s) Topical <User Schedule>  ferrous    sulfate 325 milliGRAM(s) Oral daily  levothyroxine 137 MICROGram(s) Oral daily  metoprolol succinate  milliGRAM(s) Oral daily  midodrine. 10 milliGRAM(s) Oral three times a day  nystatin Powder 1 Application(s) Topical two times a day  senna 2 Tablet(s) Oral at bedtime    MEDICATIONS  (PRN):  acetaminophen     Tablet .. 650 milliGRAM(s) Oral every 6 hours PRN Mild Pain (1 - 3)  bisacodyl 5 milliGRAM(s) Oral every 12 hours PRN Constipation  sodium chloride 0.9% lock flush 10 milliLiter(s) IV Push every 1 hour PRN Pre/post blood products, medications, blood draw, and to maintain line patency      Vital Signs Last 24 Hrs  T(C): 36.6 (01 May 2023 04:17), Max: 36.7 (30 Apr 2023 11:52)  T(F): 97.8 (01 May 2023 04:17), Max: 98.1 (30 Apr 2023 20:08)  HR: 81 (01 May 2023 04:17) (70 - 82)  BP: 106/61 (01 May 2023 04:17) (100/60 - 125/62)  BP(mean): --  RR: 18 (01 May 2023 04:17) (18 - 18)  SpO2: 94% (01 May 2023 04:17) (93% - 94%)    Parameters below as of 01 May 2023 04:17  Patient On (Oxygen Delivery Method): nasal cannula      CAPILLARY BLOOD GLUCOSE        I&O's Summary    30 Apr 2023 07:01  -  01 May 2023 07:00  --------------------------------------------------------  IN: 670 mL / OUT: 110 mL / NET: 560 mL          Appearance: Normal	  HEENT:   Normal oral mucosa, PERRL, EOMI	  Lymphatic: No lymphadenopathy  Cardiovascular: Normal S1 S2, No JVD  Respiratory: Lungs clear to auscultation	  Gastrointestinal:  Soft, Non-tender, + BS	  Skin: No rash, No ecchymoses	  Extremities:     LABS:                        8.3    11.02 )-----------( 202      ( 01 May 2023 07:04 )             26.2                                   Consultant(s) Notes Reviewed:      Care Discussed with Consultants/Other Providers:

## 2023-05-01 NOTE — PROGRESS NOTE ADULT - PROBLEM SELECTOR PLAN 1
Pt. with oliguric BEULAH. Differential also includes AIN perhaps in setting of Cefazolin? vs overdiuresis. [facility] noted to have a Scr of 1 on admission which since 4/1 has progressively increased to 7 most recently. UA with blood and also proteinuria of 1.0g.       Serological work up negative except JAGRUTI positive with homogenous pattern. Renal sonogram demonstrating a horseshoe kidney without evidence of hydronephrosis or renal calculi. Pt underwent kidney biopsy by IR on 4/14. Kidney biopsy results showed primarily interstitial disease likely pyelonephritis.       Pt underwent first session HD on 4/10 via RIJ non tunneled HD catheter. Last HD 4/28. Plan for HD again today. Pt with no signs of renal recovery for now, pt is dialysis dependent for now, but do not expect him to be ESRD. Watch for renal recovery.     Iron studies reviewed, pt with CANDY. C/w iron sulfate tabs. IV iron held off given recent infection. .PTH WNL 59. Monitor labs and urine output. Avoid nephrotoxins. Dose medications as per eGFR.    If you have any questions, please feel free to contact me  Mustapha Riggs  Nephrology Fellow  [phone]; Prefer [employer] TEAMS  (After 5pm or on weekends please page the on-call fellow).

## 2023-05-01 NOTE — PHARMACOTHERAPY INTERVENTION NOTE - COMMENTS
JANEE ARCEO, 77y Male with MSSA bacteremia, was on cefazolin but was stopped due to concern for AIN, now on vancomycin by level with the elevated serum creatinine. Got a 1 gram dose of IV vancomycin on 4/6, level this morning was 7.9 mg/L.    Recommendation(s):  1) Discussed with ID, suggest vancomycin 1250 mg IV for one dose to get the patient therapeutic, can check level tomorrow AM and re-dose as needed.  2) Patient will likely need 1 gram doses when level is below 15 until renal function begins to improve    With kind regards,  Ismael Reno, PharmD, BCIDP  Infectious Diseases Clinical Pharmacist  Available on [employer] Teams  .
KELLY Garza is a 78yo M with multiple comorbidities but per documention, CAD s/p stent, s/p MVR, total left knee replacement.  Admitted with cellulitis and now with MSSA bacteremia.    Recommend increase cefazolin to 2g q8h.  ID consult pending.    Thank you,  Gertrude Hayward, PharmD, [facility]  Clinical Pharmacist, Infectious Diseases  Available via Teams   Cell: [phone]/Pager: [phone]  
Patient is a 76 y/o male with PMHx of Afib, CAD s/p CABG, BPH, HLD, HTN, and hypothyroidism. Blood culture from 3/25 grew methicillin sensitive staphylococcus aureus. Patient was started on Cefazolin but due to concern for AIN discontinued.   Patient is currently on vancomycin dosed by level with most recent dose 1g on 4/27 and vancomycin level 4/28 is 23.8. Total duration of therapy for 42 days.     Recommendations:   -Order vancomycin level for 4/29 AM. Will follow-up level for next dose.     Discussed with MARIOLA Mills, PharmD   PGY-1 Pharmacy Resident   [facility]   Available on Teams   
Patient is a 78 y/o male with PMHx of Afib, CAD s/p CABG, BPH, HLD, HTN, and hypothyroidism. Blood culture from 3/25 grew methicillin sensitive staphylococcus aureus. Patient was started on Cefazolin but due to concern for AIN discontinued.   Patient is currently on vancomycin dosed by level for total duration of therapy for 42 days. Last dose of vancomycin 1g x1 on 4/27/23.    Vancomycin level     Date   18.6                       4/29  16                          4/30  16.4                        5/1    Recommendations:   -Vancomycin 500mg x1 after 3 hours of HD on 5/1/23  -Order vancomycin level for 5/2/23     Discussed with MARIOLA Montague

## 2023-05-01 NOTE — PROGRESS NOTE ADULT - SUBJECTIVE AND OBJECTIVE BOX
Date of Service: 05-01-23 @ 07:01           CARDIOLOGY     PROGRESS  NOTE   ________________________________________________    CHIEF COMPLAINT:Patient is a 77y old  Male who presents with a chief complaint of CHF (27 Apr 2023 14:30)  no complain  	  REVIEW OF SYSTEMS:  CONSTITUTIONAL: No fever, weight loss, or fatigue  EYES: No eye pain, visual disturbances, or discharge  ENT:  No difficulty hearing, tinnitus, vertigo; No sinus or throat pain  NECK: No pain or stiffness  RESPIRATORY: No cough, wheezing, chills or hemoptysis; No Shortness of Breath  CARDIOVASCULAR: No chest pain, palpitations, passing out, dizziness, or leg swelling  GASTROINTESTINAL: No abdominal or epigastric pain. No nausea, vomiting, or hematemesis; No diarrhea or constipation. No melena or hematochezia.  GENITOURINARY: No dysuria, frequency, hematuria, or incontinence  NEUROLOGICAL: No headaches, memory loss, loss of strength, numbness, or tremors  SKIN: No itching, burning, rashes, or lesions   LYMPH Nodes: No enlarged glands  ENDOCRINE: No heat or cold intolerance; No hair loss  MUSCULOSKELETAL: No joint pain or swelling; No muscle, back, or extremity pain  PSYCHIATRIC: No depression, anxiety, mood swings, or difficulty sleeping  HEME/LYMPH: No easy bruising, or bleeding gums  ALLERGY AND IMMUNOLOGIC: No hives or eczema	    [x ] All others negative	  [ ] Unable to obtain    PHYSICAL EXAM:  T(C): 36.6 (05-01-23 @ 04:17), Max: 36.7 (04-30-23 @ 11:52)  HR: 81 (05-01-23 @ 04:17) (70 - 82)  BP: 106/61 (05-01-23 @ 04:17) (100/60 - 125/62)  RR: 18 (05-01-23 @ 04:17) (18 - 18)  SpO2: 94% (05-01-23 @ 04:17) (93% - 94%)  Wt(kg): --  I&O's Summary    30 Apr 2023 07:01  -  01 May 2023 07:00  --------------------------------------------------------  IN: 670 mL / OUT: 110 mL / NET: 560 mL        Appearance: Normal	  HEENT:   Normal oral mucosa, PERRL, EOMI	  Lymphatic: No lymphadenopathy  Cardiovascular: Normal S1 S2, No JVD, + murmurs, No edema  Respiratory: rhonchi  Psychiatry: A & O x 3, Mood & affect appropriate  Gastrointestinal:  Soft, Non-tender, + BS	  Skin: No rashes, No ecchymoses, No cyanosis	  Neurologic: Non-focal  Extremities: Normal range of motion, No clubbing, cyanosis or edema  Vascular: Peripheral pulses palpable 2+ bilaterally    MEDICATIONS  (STANDING):  apixaban 5 milliGRAM(s) Oral two times a day  atorvastatin 40 milliGRAM(s) Oral at bedtime  chlorhexidine 4% Liquid 1 Application(s) Topical <User Schedule>  ferrous    sulfate 325 milliGRAM(s) Oral daily  levothyroxine 137 MICROGram(s) Oral daily  metoprolol succinate  milliGRAM(s) Oral daily  midodrine. 10 milliGRAM(s) Oral three times a day  nystatin Powder 1 Application(s) Topical two times a day  senna 2 Tablet(s) Oral at bedtime      TELEMETRY: 	    ECG:  	  RADIOLOGY:  OTHER: 	  	  LABS:	 	    CARDIAC MARKERS:                                8.2    11.47 )-----------( 210      ( 30 Apr 2023 07:13 )             26.2           proBNP:   Lipid Profile:   HgA1c:   TSH:         Assessment and plan  ---------------------------  77y m pmh BPH, Chronic venous insufficiency, HLD Hypothyroidism, S/P CABG x 2, S/P mitral valve replacement, SP Status post angioplasty, Sp TKR rt, Anasarca Pt comes to ed c/o shortness of breath for past month w progressive worsening of anasarca w swelling of scrotum and diff urinating, Has been noncompliant on diuretics for past two weeks w 20lb weight gain over past month. No fever and chills, sputum, nvdc, cp. PE Adult male lying stretcher w maked swelling to josh lower extr, w pitting edema,  chest  scant josh crackles w slight wheeze and prolonged exp phase  chf acute on chronic sec to RV dysfunction  tele, AFib hr controlled  +BC i bottle, MSSA , s/p ppm explant  s/p PPM and LEAD extraction  acute on chronic renal failure/ renal appreciated  acute renal failure/ hematuria/ac was held awaiting renal eval/urology called awaiting ct abdomen and pelvis has ordered since last night awaiting results  beta blocker sed to rate control and hx of cad, s/p CABG  vanco level noted  cardiomyopathy can not tolerate ACE/ARB  a.fib hr is well controlled  will add midodrine specially if needs hd  may consider to re start on Eliquis, will hold for now consider dc planning as out pt HD, dc Bumex  dc planning as pt is scheduled for out pt HD/ shiley dialysis cathete restart on midodrine 10 mg tid awaiting   awaiting pathology/ onc appreciated  dc planning  fu vanco level noted/ nephro noted  ONC folow up awaiting results  a.fib hr is well controlled

## 2023-05-01 NOTE — PROGRESS NOTE ADULT - ATTENDING COMMENTS
peter  seen on dialysis  tolerating well     augusta fraire  nephrology attending   please contact me on TEAMS   Office- [phone]

## 2023-05-01 NOTE — PROGRESS NOTE ADULT - SUBJECTIVE AND OBJECTIVE BOX
76 y/o male PMHx HTN, HLD, CAD s/p stent on ASA, A-fib, hypothyroid, CHF now presented 3/25/23 with worsening SOB, DUNCAN, peripheral edema, scrotal edema and 20 pound unintentional weight gain over a month. Patient reported non compliance with diuretics as he had doctors appts to attend to and did not want. Patient was experiencing difficulty voiding 2/2 scrotal edema. Denied CP, cough, fever, N/V/D,  The patient was initiated on IV diuresis with significant improvement in volume status with approximately 15lb weight loss. Hospital course was further complicated by MSSA bacteremia requiring pacemaker extraction with micra implant, being managed with IVC abx with PICC line in place for 6 weeks, ERENDIRA negative for vegetations. Patient developed oliguric BEULAH. Initiated on HD 4/10/23.  Underwent kidney biopsy on 23  Seen for monoclonal protein and anemia    PAST MEDICAL & SURGICAL HISTORY:  Afib  not on anticoagulation  CAD (coronary artery disease)  Varicose vein of leg  Hypothyroid  Pacemaker  H/O fracture of hip  H/O asbestosis  HTN (hypertension)  Pacemaker  Medtronic - Model RVDR01 1/10/201  Stented coronary artery  drug eluding stent 2007  H/O detached retina repair    S/P cataract surgery  History of hip surgery  left hip ORIF  H/O varicose vein stripping   left leg  History of left knee replacement   - multiple infections post op requiring reoperation in , , )  H/O foot surgery  for infection of right foot 2019  H/O inguinal hernia repair  right       MEDICATIONS  (STANDING):  apixaban 5 milliGRAM(s) Oral two times a day  aspirin  chewable 81 milliGRAM(s) Oral daily  atorvastatin 40 milliGRAM(s) Oral at bedtime  buMETAnide Injectable 1 milliGRAM(s) IV Push two times a day  ceFAZolin   IVPB 1000 milliGRAM(s) IV Intermittent every 8 hours  levothyroxine 137 MICROGram(s) Oral daily  metoprolol succinate  milliGRAM(s) Oral daily    MEDICATIONS  (PRN):      FAMILY HISTORY:  FH: skin cancer (Father)        SOCIAL HISTORY:    [ ] Non-smoker  [ ] Smoker  [ ] Alcohol    Allergies    alfuzosin (Hives)  levofloxacin (Hives)  Myrbetriq (Hives)  tamsulosin (Hives)    Intolerances    	    REVIEW OF SYSTEMS:  CONSTITUTIONAL: No fever, weight loss, or fatigue  EYES: No eye pain, visual disturbances, or discharge  ENT:  No difficulty hearing, tinnitus, vertigo; No sinus or throat pain  NECK: No pain or stiffness  RESPIRATORY: No cough, wheezing, chills or hemoptysis; + Shortness of Breath  CARDIOVASCULAR: No chest pain, palpitations, passing out, dizziness, or leg swelling  GASTROINTESTINAL: No abdominal or epigastric pain. No nausea, vomiting, or hematemesis; No diarrhea or constipation. No melena or hematochezia.  GENITOURINARY: No dysuria, frequency, hematuria, or incontinence  NEUROLOGICAL: No headaches, memory loss, loss of strength, numbness, or tremors  SKIN: No itching, burning, rashes, or lesions   LYMPH Nodes: No enlarged glands  ENDOCRINE: No heat or cold intolerance; No hair loss  MUSCULOSKELETAL: No joint pain or swelling; No muscle, back, or extremity pain  PSYCHIATRIC: No depression, anxiety, mood swings, or difficulty sleeping  HEME/LYMPH: No easy bruising, or bleeding gums  ALLERGY AND IMMUNOLOGIC: No hives or eczema	    [ ] All others negative	  [ ] Unable to obtain    PHYSICAL EXAM:  T(C): 37.4 (23 @ 14:50), Max: 37.4 (23 @ 14:50)  HR: 112 (23 @ 14:50) (112 - 112)  BP: 123/68 (23 @ 14:50) (123/68 - 123/68)  RR: 20 (23 @ 14:50) (20 - 20)  SpO2: 100% (23 @ 14:50) (100% - 100%)  Wt(kg): --  I&O's Summary      Appearance: Normal	  HEENT:   Normal oral mucosa, PERRL, EOMI	  Lymphatic: No lymphadenopathy  Cardiovascular: Normal S1 S2, No JVD, No murmurs, + edema  Respiratory: Lungs clear to auscultation	  Psychiatry: A & O x 3, Mood & affect appropriate  Gastrointestinal:  Soft, Non-tender, + BS	  Skin: No rashes, No ecchymoses, No cyanosis	  Neurologic: Non-focal  Extremities: Normal range of motion, No clubbing, cyanosis , + edema  Vascular: Peripheral pulses palpable 2+ bilaterally    TELEMETRY: 	    ECG:  	  RADIOLOGY:  OTHER: 	  	  LABS:	 	    CARDIAC MARKERS:                              11.1   12.13 )-----------( 131      ( 25 Mar 2023 12:16 )             34.1         137  |  100  |  24<H>  ----------------------------<  122<H>  4.5   |  26  |  1.00    Ca    9.2      25 Mar 2023 12:16  Mg     1.9         TPro  7.5  /  Alb  3.9  /  TBili  1.0  /  DBili  x   /  AST  26  /  ALT  19  /  AlkPhos  111      proBNP:   Lipid Profile:   HgA1c:   TSH:   PT/INR - ( 25 Mar 2023 12:16 )   PT: 15.7 sec;   INR: 1.35 ratio         PTT - ( 25 Mar 2023 12:16 )  PTT:31.0 sec    PREVIOUS DIAGNOSTIC TESTING:      < from: 12 Lead ECG (22 @ 17:25) >  Diagnosis Line ATRIAL FIBRILLATION WITH PREMATURE VENTRICULAR OR ABERRANTLY CONDUCTED COMPLEXES  NONSPECIFIC T WAVE ABNORMALITY  ABNORMAL ECG  WHEN COMPARED WITH ECG OF 01-SEP-2021 00:31,  new afib noted      < from: Xray Chest 1 View AP/PA (23 @ 12:40) >  Lungs: Pulmonary vascular congestion. Right midlung scar.  Pleura: No pleural effusion or pneumothorax.  Heart And Mediastinum: Heart size is within normal limits. Left chest   wall dual-chamber pacemaker. Mediastinal wires and surgical clips. Left   atrial appendage clip. Mitral valve replacement.  Skeleton: There is no acute osseus abnormality.    IMPRESSION:  Pulmonary vascular congestion. Right midlung scar.         (25 Mar 2023 18:00)    PAST MEDICAL & SURGICAL HISTORY:  Afib  not on anticoagulation      CAD (coronary artery disease)      Varicose vein of leg      Hypothyroid      Pacemaker      H/O fracture of hip        H/O asbestosis      HTN (hypertension)      Pacemaker  Medtronic - Model RVDR01 1/10/2012      Stented coronary artery  drug eluding stent 2007      H/O detached retina repair        S/P cataract surgery      History of hip surgery  left hip ORIF      H/O varicose vein stripping   left leg      History of left knee replacement   - multiple infections post op requiring reoperation in , , )      H/O foot surgery  for infection of right foot 2019      H/O inguinal hernia repair  right         Allergies    Myrbetriq (Hives)  tamsulosin (Hives)  levofloxacin (Hives)  alfuzosin (Hives)    Intolerances      Social History:    Medications:  acetaminophen     Tablet .. 650 milliGRAM(s) Oral every 6 hours PRN Mild Pain (1 - 3)  apixaban 5 milliGRAM(s) Oral two times a day  atorvastatin 40 milliGRAM(s) Oral at bedtime  bisacodyl 5 milliGRAM(s) Oral every 12 hours PRN Constipation  chlorhexidine 4% Liquid 1 Application(s) Topical <User Schedule>  ferrous    sulfate 325 milliGRAM(s) Oral daily  levothyroxine 137 MICROGram(s) Oral daily  metoprolol succinate  milliGRAM(s) Oral daily  midodrine. 10 milliGRAM(s) Oral three times a day  nystatin Powder 1 Application(s) Topical two times a day  senna 2 Tablet(s) Oral at bedtime  sodium chloride 0.9% lock flush 10 milliLiter(s) IV Push every 1 hour PRN Pre/post blood products, medications, blood draw, and to maintain line patency    Labs:  CBC Full  -  ( 01 May 2023 07:04 )  WBC Count : 11.02 K/uL  RBC Count : 2.85 M/uL  Hemoglobin : 8.3 g/dL  Hematocrit : 26.2 %  Platelet Count - Automated : 202 K/uL  Mean Cell Volume : 91.9 fl  Mean Cell Hemoglobin : 29.1 pg  Mean Cell Hemoglobin Concentration : 31.7 gm/dL  Auto Neutrophil # : x  Auto Lymphocyte # : x  Auto Monocyte # : x  Auto Eosinophil # : x  Auto Basophil # : x  Auto Neutrophil % : x  Auto Lymphocyte % : x  Auto Monocyte % : x  Auto Eosinophil % : x  Auto Basophil % : x    Immunoglobulins Panel (23 @ 06:31)   Quantitative Ig mg/dL  Quantitative IgA: 544 mg/dL  Quantitative IgM: 40 mg/dL  GOLD Kappa: 25.56 mg/dL  GOLD Lambda: 25.37 mg/dL  [place]/Lambda Free Light Chain Ratio, Serum: 1.01 Ratio  Immunofixation, Serum:   Weak IgA Lambda Band Identified   Reference Range: None Detected (23 @ 11:47)% M [place]: Unable to Quantitate (23 @ 11:47)     UPEP pendingFree Kappa and Lambda Light Chains, Urine (23 @ 19:21)   [place] Free Light Chains, Urine: 120.31 mg/L  Lambda Free Light Chains, Urine: 120.60 mg/L  Kappa/Lambda Free Light Chain Ratio, Urine: 1.00: Note: Turbidimetric Method, reference values changed effective 3/18/2020. Ratio      Radiology:             ROS:  Patient comfortable without distress  No SOB or chest pain  No palpitation  No abdominal pain, diarrhaea or constipation  No weakness of extremities  No skin changes or swelling of legs  Rest of the comprehensive ROS was negative  Vital Signs Last 24 Hrs  T(C): 36.2 (01 May 2023 08:40), Max: 36.7 (2023 11:52)  T(F): 97.2 (01 May 2023 08:40), Max: 98.1 (2023 20:08)  HR: 80 (01 May 2023 08:40) (70 - 82)  BP: 117/73 (01 May 2023 08:40) (100/60 - 125/62)  BP(mean): --  RR: 18 (01 May 2023 08:40) (18 - 18)  SpO2: 98% (01 May 2023 08:40) (93% - 98%)    Parameters below as of 01 May 2023 08:40  Patient On (Oxygen Delivery Method): nasal cannula  O2 Flow (L/min): 2      Physical exam:  Patient alert and oriented  No distress  CVS: S1, S2 regular or murmur  Chest: bilateral breath sound without rales  Abdomen: soft, not tender, no organomegaly or masses  CNS: No focal neuro deficit  Musculoskeletal:  Normal range of motion  Skin: No rash    Assessment and Plan: 76 y/o male PMHx HTN, HLD, CAD s/p stent on ASA, A-fib, hypothyroid, CHF now presented 3/25/23 with worsening SOB, DUNCAN, peripheral edema, scrotal edema and 20 pound unintentional weight gain over a month. Patient reported non compliance with diuretics as he had doctors appts to attend to and did not want. Patient was experiencing difficulty voiding 2/2 scrotal edema. Denied CP, cough, fever, N/V/D,  The patient was initiated on IV diuresis with significant improvement in volume status with approximately 15lb weight loss. Hospital course was further complicated by MSSA bacteremia requiring pacemaker extraction with micra implant, being managed with IVC abx with PICC line in place for 6 weeks, ERENDIRA negative for vegetations. Patient developed oliguric BEULAH. Initiated on HD 4/10/23.  Underwent kidney biopsy on 23  Seen for monoclonal protein and anemia    PAST MEDICAL & SURGICAL HISTORY:  Afib  not on anticoagulation  CAD (coronary artery disease)  Varicose vein of leg  Hypothyroid  Pacemaker  H/O fracture of hip  H/O asbestosis  HTN (hypertension)  Pacemaker  Medtronic - Model RVDR01 1/10/201  Stented coronary artery  drug eluding stent 2007  H/O detached retina repair    S/P cataract surgery  History of hip surgery  left hip ORIF  H/O varicose vein stripping   left leg  History of left knee replacement   - multiple infections post op requiring reoperation in , , )  H/O foot surgery  for infection of right foot 2019  H/O inguinal hernia repair  right           Allergies    Myrbetriq (Hives)  tamsulosin (Hives)  levofloxacin (Hives)  alfuzosin (Hives)    Intolerances      Social History:    Medications:  acetaminophen     Tablet .. 650 milliGRAM(s) Oral every 6 hours PRN Mild Pain (1 - 3)  apixaban 5 milliGRAM(s) Oral two times a day  atorvastatin 40 milliGRAM(s) Oral at bedtime  bisacodyl 5 milliGRAM(s) Oral every 12 hours PRN Constipation  chlorhexidine 4% Liquid 1 Application(s) Topical <User Schedule>  ferrous    sulfate 325 milliGRAM(s) Oral daily  levothyroxine 137 MICROGram(s) Oral daily  metoprolol succinate  milliGRAM(s) Oral daily  midodrine. 10 milliGRAM(s) Oral three times a day  nystatin Powder 1 Application(s) Topical two times a day  senna 2 Tablet(s) Oral at bedtime  sodium chloride 0.9% lock flush 10 milliLiter(s) IV Push every 1 hour PRN Pre/post blood products, medications, blood draw, and to maintain line patency    Labs:  CBC Full  -  ( 01 May 2023 07:04 )  WBC Count : 11.02 K/uL  RBC Count : 2.85 M/uL  Hemoglobin : 8.3 g/dL  Hematocrit : 26.2 %  Platelet Count - Automated : 202 K/uL  Mean Cell Volume : 91.9 fl  Mean Cell Hemoglobin : 29.1 pg  Mean Cell Hemoglobin Concentration : 31.7 gm/dL  Auto Neutrophil # : x  Auto Lymphocyte # : x  Auto Monocyte # : x  Auto Eosinophil # : x  Auto Basophil # : x  Auto Neutrophil % : x  Auto Lymphocyte % : x  Auto Monocyte % : x  Auto Eosinophil % : x  Auto Basophil % : x    Immunoglobulins Panel (23 @ 06:31)   Quantitative Ig mg/dL  Quantitative IgA: 544 mg/dL  Quantitative IgM: 40 mg/dL  GOLD Kappa: 25.56 mg/dL  GOLD Lambda: 25.37 mg/dL  [place]/Lambda Free Light Chain Ratio, Serum: 1.01 Ratio  Immunofixation, Serum:   Weak IgA Lambda Band Identified   Reference Range: None Detected (.. @ 11:47)% M [place]: Unable to Quantitate (..23 @ 11:47)     UPEP pendingFree Kappa and Lambda Light Chains, Urine (23 @ 19:21)   [place] Free Light Chains, Urine: 120.31 mg/L  Lambda Free Light Chains, Urine: 120.60 mg/L  [place]/Lambda Free Light Chain Ratio, Urine: 1.00: Note: Turbidimetric Method, reference values changed effective 3/18/2020. Ratio      Radiology:             ROS:  Patient comfortable without distress  No SOB or chest pain  No palpitation  No abdominal pain, diarrhaea or constipation  No weakness of extremities  No skin changes or swelling of legs  Rest of the comprehensive ROS was negative  Vital Signs Last 24 Hrs  T(C): 36.2 (01 May 2023 08:40), Max: 36.7 (2023 11:52)  T(F): 97.2 (01 May 2023 08:40), Max: 98.1 (2023 20:08)  HR: 80 (01 May 2023 08:40) (70 - 82)  BP: 117/73 (01 May 2023 08:40) (100/60 - 125/62)  BP(mean): --  RR: 18 (01 May 2023 08:40) (18 - 18)  SpO2: 98% (01 May 2023 08:40) (93% - 98%)    Parameters below as of 01 May 2023 08:40  Patient On (Oxygen Delivery Method): nasal cannula  O2 Flow (L/min): 2      Physical exam:  Patient alert and oriented  No distress  CVS: S1, S2   Chest: bilateral breath sound without rales  Abdomen: soft, not tender, no organomegaly or masses  CNS: No focal neuro deficit  Musculoskeletal:  Normal range of motion  Skin: No rash    Assessment and Plan:

## 2023-05-03 ENCOUNTER — TRANSCRIPTION ENCOUNTER (OUTPATIENT)
Age: 78
End: 2023-05-03

## 2023-05-10 ENCOUNTER — TRANSCRIPTION ENCOUNTER (OUTPATIENT)
Age: 78
End: 2023-05-10

## 2023-05-10 LAB
% GAMMA, URINE: 26.1 % — SIGNIFICANT CHANGE UP
ALBUMIN 24H MFR UR ELPH: 36.7 % — SIGNIFICANT CHANGE UP
ALPHA1 GLOB 24H MFR UR ELPH: 14.7 % — SIGNIFICANT CHANGE UP
ALPHA2 GLOB 24H MFR UR ELPH: 11.4 % — SIGNIFICANT CHANGE UP
B-GLOBULIN 24H MFR UR ELPH: 11.1 % — SIGNIFICANT CHANGE UP
COLLECT DURATION TIME UR: 24 HR — SIGNIFICANT CHANGE UP
INTERPRETATION 24H UR IFE-IMP: SIGNIFICANT CHANGE UP
INTERPRETATION 24H UR IFE-IMP: SIGNIFICANT CHANGE UP
M PROTEIN 24H UR ELPH-MRATE: 0 % — SIGNIFICANT CHANGE UP
M PROTEIN 24H UR ELPH-MRATE: 0 MG/24HR — SIGNIFICANT CHANGE UP (ref 0–0)
M PROTEIN 24H UR ELPH-MRATE: 0 MG/DL — SIGNIFICANT CHANGE UP
PROT PATTERN 24H UR ELPH-IMP: SIGNIFICANT CHANGE UP
PROTEIN QUANT CALC, URINE: 877 MG/24 H — HIGH (ref 50–100)
TOTAL VOLUME - 24 HOUR: 80 ML — SIGNIFICANT CHANGE UP
URINE CREATININE CALCULATION: 0.2 G/24 H — LOW (ref 1–2)

## 2023-05-16 ENCOUNTER — APPOINTMENT (OUTPATIENT)
Dept: INFECTIOUS DISEASE | Facility: CLINIC | Age: 78
End: 2023-05-16
Payer: MEDICARE

## 2023-05-16 VITALS
WEIGHT: 213 LBS | TEMPERATURE: 97.5 F | OXYGEN SATURATION: 95 % | SYSTOLIC BLOOD PRESSURE: 116 MMHG | HEIGHT: 66 IN | HEART RATE: 86 BPM | BODY MASS INDEX: 34.23 KG/M2 | DIASTOLIC BLOOD PRESSURE: 67 MMHG

## 2023-05-16 DIAGNOSIS — Z09 ENCOUNTER FOR FOLLOW-UP EXAMINATION AFTER COMPLETED TREATMENT FOR CONDITIONS OTHER THAN MALIGNANT NEOPLASM: ICD-10-CM

## 2023-05-16 DIAGNOSIS — B95.8 BACTEREMIA: ICD-10-CM

## 2023-05-16 DIAGNOSIS — R78.81 BACTEREMIA: ICD-10-CM

## 2023-05-16 PROCEDURE — 99212 OFFICE O/P EST SF 10 MIN: CPT

## 2023-05-16 NOTE — HISTORY OF PRESENT ILLNESS
[FreeTextEntry1] : 76 y/o male comes for visit for mssa bacteremia.\par \par S/p 42 days of vancomycin. Developed renal failure - ?from ancef and abx changed to vancomycin\par \par No fever. Still on HD three times per week. \par \par

## 2023-05-16 NOTE — ASSESSMENT
[FreeTextEntry1] : 78 y/o male with MSSA bacteremia comes for visit.\par \par He should`ve completed 42 days of abx. S/p PPm extraction at [facility]. \par \par Check surveillance blood cx today. \par \par \par \par

## 2023-05-16 NOTE — PHYSICAL EXAM
[General Appearance - Alert] : alert [General Appearance - In No Acute Distress] : in no acute distress [Sclera] : the sclera and conjunctiva were normal [Outer Ear] : the ears and nose were normal in appearance [Oropharynx] : the oropharynx was normal with no thrush [Jugular Venous Distention Increased] : there was no jugular-venous distention [Auscultation Breath Sounds / Voice Sounds] : lungs were clear to auscultation bilaterally [Heart Sounds] : normal S1 and S2 [Bowel Sounds] : normal bowel sounds [Abdomen Soft] : soft [Abdomen Tenderness] : non-tender [] : no hepato-splenomegaly [Abdomen Mass (___ Cm)] : no abdominal mass palpated [Costovertebral Angle Tenderness] : no CVA tenderness [No Focal Deficits] : no focal deficits [Oriented To Time, Place, And Person] : oriented to person, place, and time [FreeTextEntry1] : flat affect

## 2023-05-16 NOTE — CONSULT LETTER
[Courtesy Letter:] : I had the pleasure of seeing your patient, [unfilled], in my office today. [Please see my note below.] : Please see my note below. [Consult Closing:] : Thank you very much for allowing me to participate in the care of this patient.  If you have any questions, please do not hesitate to contact me. [Sincerely,] : Sincerely, [FreeTextEntry2] : To whom it may concern,  [FreeTextEntry3] : Kevin Olmos\par Attending Physician\par Infectious Disease\par [facility]\par [facility]\par [address]\[place] [address]\par Tel: [phone]\par Fax: [phone]\par Email: [email or web address]\par \par

## 2023-05-17 ENCOUNTER — TRANSCRIPTION ENCOUNTER (OUTPATIENT)
Age: 78
End: 2023-05-17

## 2023-05-24 ENCOUNTER — TRANSCRIPTION ENCOUNTER (OUTPATIENT)
Age: 78
End: 2023-05-24

## 2023-05-24 LAB
BACTERIA BLD CULT: NORMAL
BACTERIA BLD CULT: NORMAL

## 2023-05-31 ENCOUNTER — TRANSCRIPTION ENCOUNTER (OUTPATIENT)
Age: 78
End: 2023-05-31

## 2023-06-14 LAB
CULTURE RESULTS: SIGNIFICANT CHANGE UP
SPECIMEN SOURCE: SIGNIFICANT CHANGE UP

## 2023-06-22 ENCOUNTER — APPOINTMENT (OUTPATIENT)
Dept: OPHTHALMOLOGY | Facility: CLINIC | Age: 78
End: 2023-06-22

## 2023-07-18 ENCOUNTER — APPOINTMENT (OUTPATIENT)
Dept: SURGERY | Facility: CLINIC | Age: 78
End: 2023-07-18

## 2023-07-26 DIAGNOSIS — Z95.0 PRESENCE OF CARDIAC PACEMAKER: ICD-10-CM

## 2023-07-26 DIAGNOSIS — Z79.01 LONG TERM (CURRENT) USE OF ANTICOAGULANTS: ICD-10-CM

## 2023-07-26 RX ORDER — ASPIRIN ENTERIC COATED TABLETS 81 MG 81 MG/1
81 TABLET, DELAYED RELEASE ORAL
Qty: 30 | Refills: 0 | Status: DISCONTINUED | COMMUNITY
Start: 2021-10-27 | End: 2023-07-26

## 2023-07-31 ENCOUNTER — APPOINTMENT (OUTPATIENT)
Dept: GASTROENTEROLOGY | Facility: CLINIC | Age: 78
End: 2023-07-31

## 2023-08-01 ENCOUNTER — APPOINTMENT (OUTPATIENT)
Dept: INTERVENTIONAL RADIOLOGY/VASCULAR | Facility: CLINIC | Age: 78
End: 2023-08-01

## 2023-08-01 NOTE — ASSESSMENT
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  # Internal Hemorrhoids  - pt reports rectal bleeding & rectal pain for several years which is compromising overall quality of life - on ASA & Xarelto regimen for  - referred by Dr Hayesfor angiogram +/- internal hemorrhoidal  embolization - I reviewed the procedure, including the risks (non-targeted embolization, ALYCIA, bleeding, infection, access site bleeding/ecchymosis/pseudoaneurysm), benefits, alternatives, and expected post procedure course (rectal spotting / some bleeding lasting 1-3 months, mild to moderate rectal pain initially) - performed with sedation - access is typically via right CFA - procedure is typically 1 hour in duration - coils will be utilized as embolic material - discussed success rate of procedure is 85 - 90 %  - one time dose of antibiotics to be administered immediately pre op  - PACU stay is 2-4 hours - IR f/u 2 wks post procedure - colorectal f/u as per Dr Hayes  #CAD   Mr. ARCEO's comprehension was confirmed & all questions were asked and answered to his her satisfaction. Mr. ARCEO would like to move forward with the proposed procedure.  IR office contact information was provided to the pt. Our office will be in touch to schedule PSTs & procedure.

## 2023-08-01 NOTE — END OF VISIT
[FreeTextEntry3] : I, Dr Gan, personally performed the evaluation & management (E/M) services for this new patient. The E/M includes conducting initial evaluation, assessing all conditions, and establishing the plan of care. Today, my ACPs, Brandee Flannery ANP-BC & Lynette Frank FNP-BC, were present to observe my evaluation & management services for this patient to be followed, going forward.\par  \par

## 2023-08-01 NOTE — HISTORY OF PRESENT ILLNESS
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  Mr Garza   Colorectal Dr Armenta Cardiac Dr   Pt a no show for f/u consult with Dr slaughter. Left msg on pt's voicemail with IR bkg office # for him her to reschedule. IR bkg office apprised to contact the pt to reschedule it.

## 2023-08-09 ENCOUNTER — APPOINTMENT (OUTPATIENT)
Dept: INTERVENTIONAL RADIOLOGY/VASCULAR | Facility: CLINIC | Age: 78
End: 2023-08-09

## 2023-08-09 DIAGNOSIS — K64.8 OTHER HEMORRHOIDS: ICD-10-CM

## 2023-08-09 NOTE — HISTORY OF PRESENT ILLNESS
[0] : ~His/Her~ pain was 0 out of 10 [FreeTextEntry1] : This is a 78-year-old male with hx of CAD with stents x  in  & cABG x 2V in   on ASA & Eliquis, MVP, aortic valve insufficiency, PPM implant exchange on 3/29/23 for tachybrady syndrome, hypothyroidism, internal hemorrhoids s/p rubber band ligation now with resumption of rectal bleeding who presents to IR for internal hemorrhoidal artery embolization consultation, as referred by Dr Hayes.  Mr Garza states rectal bleeding is compromising his quality of life.    Colorectal Dr Hayes Cardiac Dr Christensen Cardiac Intensivist Dr Chaudhry

## 2023-08-09 NOTE — ASSESSMENT
[Other: _____] : [unfilled] [FreeTextEntry1] : This is a 78-year-old male with hx of CAD with stents x  in  & cABG x 2V in   on ASA & Eliquis, MVP, aortic valve insufficiency, PPM implant exchange on 3/29/23 for tachybrady syndrome, hypothyroidism, internal hemorrhoids s/p rubber band ligation now with resumption of rectal bleeding who presents to IR for internal hemorrhoidal artery embolization consultation.  # Internal Hemorrhoids  - pt reports rectal bleeding & rectal pain for several years which is compromising overall quality of life - referred by Dr Hayes for angiogram +/- internal hemorrhoidal  embolization - I reviewed the procedure, including the risks (non-targeted embolization, ALYCIA, bleeding, infection, access site bleeding/ecchymosis/pseudoaneurysm), benefits, alternatives, and expected post procedure course (rectal spotting / some bleeding lasting 1-3 months, mild to moderate rectal pain initially) - performed with sedation - access is typically via right CFA - procedure is typically 1 hour in duration - coils will be utilized as embolic material - discussed success rate of procedure is 85 - 90 %  - one time dose of antibiotics to be administered immediately pre op  - PACU stay is 2-4 hours - IR f/u 2 wks post procedure - colorectal f/u as per Dr Hayes  # CAD, MVP, Pacemaker  - denies cardiac symptomatology - managed by Dr Chaudhry - s/p 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 ARCEO's comprehension was confirmed & all questions were asked and answered to his satisfaction. Mr. ARCEO would like to move forward with the proposed procedure.  IR office contact information was provided to the pt. Our office will be in touch to schedule PSTs & procedure.

## 2023-08-09 NOTE — END OF VISIT
[FreeTextEntry3] : I, Dr Gan, personally performed the evaluation & management (E/M) services for this new patient. The E/M includes conducting initial evaluation, assessing all conditions, and establishing the plan of care. Today, my ACPs, Brandee Flannery ANP-BC & Lynette Frank FNP-BC, were present to observe my evaluation & management services for this patient to be followed, going forward.

## 2023-08-11 ENCOUNTER — INPATIENT (INPATIENT)
Facility: HOSPITAL | Age: 78
LOS: 18 days | Discharge: NURSING, REHAB OR LONG-TERM CARE | DRG: 356 | End: 2023-08-30
Attending: INTERNAL MEDICINE | Admitting: INTERNAL MEDICINE
Payer: MEDICARE

## 2023-08-11 VITALS
HEIGHT: 67 IN | DIASTOLIC BLOOD PRESSURE: 78 MMHG | HEART RATE: 60 BPM | SYSTOLIC BLOOD PRESSURE: 160 MMHG | RESPIRATION RATE: 18 BRPM | WEIGHT: 179.9 LBS | OXYGEN SATURATION: 88 %

## 2023-08-11 DIAGNOSIS — Z98.890 OTHER SPECIFIED POSTPROCEDURAL STATES: Chronic | ICD-10-CM

## 2023-08-11 DIAGNOSIS — K92.2 GASTROINTESTINAL HEMORRHAGE, UNSPECIFIED: ICD-10-CM

## 2023-08-11 DIAGNOSIS — Z98.49 CATARACT EXTRACTION STATUS, UNSPECIFIED EYE: Chronic | ICD-10-CM

## 2023-08-11 DIAGNOSIS — Z96.652 PRESENCE OF LEFT ARTIFICIAL KNEE JOINT: Chronic | ICD-10-CM

## 2023-08-11 DIAGNOSIS — Z95.0 PRESENCE OF CARDIAC PACEMAKER: Chronic | ICD-10-CM

## 2023-08-11 DIAGNOSIS — Z95.5 PRESENCE OF CORONARY ANGIOPLASTY IMPLANT AND GRAFT: Chronic | ICD-10-CM

## 2023-08-11 DIAGNOSIS — N17.9 ACUTE KIDNEY FAILURE, UNSPECIFIED: ICD-10-CM

## 2023-08-11 LAB
ALBUMIN SERPL ELPH-MCNC: 3.4 G/DL — SIGNIFICANT CHANGE UP (ref 3.3–5)
ALP SERPL-CCNC: 137 U/L — HIGH (ref 40–120)
ALT FLD-CCNC: 12 U/L — SIGNIFICANT CHANGE UP (ref 10–45)
ANION GAP SERPL CALC-SCNC: 14 MMOL/L — SIGNIFICANT CHANGE UP (ref 5–17)
ANION GAP SERPL CALC-SCNC: 15 MMOL/L — SIGNIFICANT CHANGE UP (ref 5–17)
APPEARANCE UR: ABNORMAL
APTT BLD: 36.4 SEC — HIGH (ref 24.5–35.6)
AST SERPL-CCNC: 19 U/L — SIGNIFICANT CHANGE UP (ref 10–40)
BACTERIA # UR AUTO: ABNORMAL
BASE EXCESS BLDV CALC-SCNC: 2.9 MMOL/L — SIGNIFICANT CHANGE UP (ref -2–3)
BASOPHILS # BLD AUTO: 0.09 K/UL — SIGNIFICANT CHANGE UP (ref 0–0.2)
BASOPHILS NFR BLD AUTO: 1.3 % — SIGNIFICANT CHANGE UP (ref 0–2)
BILIRUB SERPL-MCNC: 0.5 MG/DL — SIGNIFICANT CHANGE UP (ref 0.2–1.2)
BILIRUB UR-MCNC: ABNORMAL
BUN SERPL-MCNC: 39 MG/DL — HIGH (ref 7–23)
BUN SERPL-MCNC: 42 MG/DL — HIGH (ref 7–23)
CA-I SERPL-SCNC: 1.4 MMOL/L — HIGH (ref 1.15–1.33)
CALCIUM SERPL-MCNC: 10.3 MG/DL — SIGNIFICANT CHANGE UP (ref 8.4–10.5)
CALCIUM SERPL-MCNC: 10.6 MG/DL — HIGH (ref 8.4–10.5)
CHLORIDE BLDV-SCNC: 101 MMOL/L — SIGNIFICANT CHANGE UP (ref 96–108)
CHLORIDE SERPL-SCNC: 100 MMOL/L — SIGNIFICANT CHANGE UP (ref 96–108)
CHLORIDE SERPL-SCNC: 99 MMOL/L — SIGNIFICANT CHANGE UP (ref 96–108)
CO2 BLDV-SCNC: 30 MMOL/L — HIGH (ref 22–26)
CO2 SERPL-SCNC: 24 MMOL/L — SIGNIFICANT CHANGE UP (ref 22–31)
CO2 SERPL-SCNC: 25 MMOL/L — SIGNIFICANT CHANGE UP (ref 22–31)
COLOR SPEC: ABNORMAL
CREAT SERPL-MCNC: 2.33 MG/DL — HIGH (ref 0.5–1.3)
CREAT SERPL-MCNC: 2.36 MG/DL — HIGH (ref 0.5–1.3)
DIFF PNL FLD: ABNORMAL
EGFR: 28 ML/MIN/1.73M2 — LOW
EGFR: 28 ML/MIN/1.73M2 — LOW
EOSINOPHIL # BLD AUTO: 0.32 K/UL — SIGNIFICANT CHANGE UP (ref 0–0.5)
EOSINOPHIL NFR BLD AUTO: 4.5 % — SIGNIFICANT CHANGE UP (ref 0–6)
EPI CELLS # UR: 0 /HPF — SIGNIFICANT CHANGE UP
GAS PNL BLDV: 135 MMOL/L — LOW (ref 136–145)
GAS PNL BLDV: SIGNIFICANT CHANGE UP
GAS PNL BLDV: SIGNIFICANT CHANGE UP
GLUCOSE BLDV-MCNC: 82 MG/DL — SIGNIFICANT CHANGE UP (ref 70–99)
GLUCOSE SERPL-MCNC: 81 MG/DL — SIGNIFICANT CHANGE UP (ref 70–99)
GLUCOSE SERPL-MCNC: 81 MG/DL — SIGNIFICANT CHANGE UP (ref 70–99)
GLUCOSE UR QL: NEGATIVE — SIGNIFICANT CHANGE UP
HBV SURFACE AG SER-ACNC: SIGNIFICANT CHANGE UP
HCO3 BLDV-SCNC: 28 MMOL/L — SIGNIFICANT CHANGE UP (ref 22–29)
HCT VFR BLD CALC: 36.9 % — LOW (ref 39–50)
HCT VFR BLD CALC: 40.3 % — SIGNIFICANT CHANGE UP (ref 39–50)
HCT VFR BLDA CALC: 37 % — LOW (ref 39–51)
HGB BLD CALC-MCNC: 12.2 G/DL — LOW (ref 12.6–17.4)
HGB BLD-MCNC: 11.5 G/DL — LOW (ref 13–17)
HGB BLD-MCNC: 12.4 G/DL — LOW (ref 13–17)
HYALINE CASTS # UR AUTO: 1 /LPF — SIGNIFICANT CHANGE UP (ref 0–2)
IMM GRANULOCYTES NFR BLD AUTO: 0.1 % — SIGNIFICANT CHANGE UP (ref 0–0.9)
INR BLD: 1.8 RATIO — HIGH (ref 0.85–1.18)
INR BLD: 2.29 RATIO — HIGH (ref 0.85–1.18)
KETONES UR-MCNC: NEGATIVE — SIGNIFICANT CHANGE UP
LACTATE BLDV-MCNC: 1.1 MMOL/L — SIGNIFICANT CHANGE UP (ref 0.5–2)
LEUKOCYTE ESTERASE UR-ACNC: ABNORMAL
LIDOCAIN IGE QN: 12 U/L — SIGNIFICANT CHANGE UP (ref 7–60)
LYMPHOCYTES # BLD AUTO: 1.14 K/UL — SIGNIFICANT CHANGE UP (ref 1–3.3)
LYMPHOCYTES # BLD AUTO: 16 % — SIGNIFICANT CHANGE UP (ref 13–44)
MAGNESIUM SERPL-MCNC: 1.7 MG/DL — SIGNIFICANT CHANGE UP (ref 1.6–2.6)
MCHC RBC-ENTMCNC: 28.4 PG — SIGNIFICANT CHANGE UP (ref 27–34)
MCHC RBC-ENTMCNC: 28.6 PG — SIGNIFICANT CHANGE UP (ref 27–34)
MCHC RBC-ENTMCNC: 30.8 GM/DL — LOW (ref 32–36)
MCHC RBC-ENTMCNC: 31.2 GM/DL — LOW (ref 32–36)
MCV RBC AUTO: 91.8 FL — SIGNIFICANT CHANGE UP (ref 80–100)
MCV RBC AUTO: 92.2 FL — SIGNIFICANT CHANGE UP (ref 80–100)
MONOCYTES # BLD AUTO: 0.62 K/UL — SIGNIFICANT CHANGE UP (ref 0–0.9)
MONOCYTES NFR BLD AUTO: 8.7 % — SIGNIFICANT CHANGE UP (ref 2–14)
NEUTROPHILS # BLD AUTO: 4.96 K/UL — SIGNIFICANT CHANGE UP (ref 1.8–7.4)
NEUTROPHILS NFR BLD AUTO: 69.4 % — SIGNIFICANT CHANGE UP (ref 43–77)
NITRITE UR-MCNC: POSITIVE
NRBC # BLD: 0 /100 WBCS — SIGNIFICANT CHANGE UP (ref 0–0)
NRBC # BLD: 0 /100 WBCS — SIGNIFICANT CHANGE UP (ref 0–0)
PCO2 BLDV: 47 MMHG — SIGNIFICANT CHANGE UP (ref 42–55)
PH BLDV: 7.39 — SIGNIFICANT CHANGE UP (ref 7.32–7.43)
PH UR: 6 — SIGNIFICANT CHANGE UP (ref 5–8)
PLATELET # BLD AUTO: 142 K/UL — LOW (ref 150–400)
PLATELET # BLD AUTO: 152 K/UL — SIGNIFICANT CHANGE UP (ref 150–400)
PO2 BLDV: 44 MMHG — SIGNIFICANT CHANGE UP (ref 25–45)
POTASSIUM BLDV-SCNC: 3.5 MMOL/L — SIGNIFICANT CHANGE UP (ref 3.5–5.1)
POTASSIUM SERPL-MCNC: 3.4 MMOL/L — LOW (ref 3.5–5.3)
POTASSIUM SERPL-MCNC: 3.5 MMOL/L — SIGNIFICANT CHANGE UP (ref 3.5–5.3)
POTASSIUM SERPL-SCNC: 3.4 MMOL/L — LOW (ref 3.5–5.3)
POTASSIUM SERPL-SCNC: 3.5 MMOL/L — SIGNIFICANT CHANGE UP (ref 3.5–5.3)
PROT SERPL-MCNC: 7 G/DL — SIGNIFICANT CHANGE UP (ref 6–8.3)
PROT UR-MCNC: ABNORMAL
PROTHROM AB SERPL-ACNC: 19.5 SEC — HIGH (ref 9.5–13)
PROTHROM AB SERPL-ACNC: 23.5 SEC — HIGH (ref 9.5–13)
RAPID RVP RESULT: SIGNIFICANT CHANGE UP
RBC # BLD: 4.02 M/UL — LOW (ref 4.2–5.8)
RBC # BLD: 4.37 M/UL — SIGNIFICANT CHANGE UP (ref 4.2–5.8)
RBC # FLD: 15.4 % — HIGH (ref 10.3–14.5)
RBC # FLD: 15.4 % — HIGH (ref 10.3–14.5)
RBC CASTS # UR COMP ASSIST: 463 /HPF — HIGH (ref 0–4)
SAO2 % BLDV: 67.7 % — SIGNIFICANT CHANGE UP (ref 67–88)
SARS-COV-2 RNA SPEC QL NAA+PROBE: SIGNIFICANT CHANGE UP
SODIUM SERPL-SCNC: 138 MMOL/L — SIGNIFICANT CHANGE UP (ref 135–145)
SODIUM SERPL-SCNC: 139 MMOL/L — SIGNIFICANT CHANGE UP (ref 135–145)
SP GR SPEC: 1.02 — SIGNIFICANT CHANGE UP (ref 1.01–1.02)
TROPONIN T, HIGH SENSITIVITY RESULT: 58 NG/L — HIGH (ref 0–51)
UROBILINOGEN FLD QL: ABNORMAL
WBC # BLD: 6.63 K/UL — SIGNIFICANT CHANGE UP (ref 3.8–10.5)
WBC # BLD: 7.14 K/UL — SIGNIFICANT CHANGE UP (ref 3.8–10.5)
WBC # FLD AUTO: 6.63 K/UL — SIGNIFICANT CHANGE UP (ref 3.8–10.5)
WBC # FLD AUTO: 7.14 K/UL — SIGNIFICANT CHANGE UP (ref 3.8–10.5)
WBC UR QL: 631 /HPF — HIGH (ref 0–5)

## 2023-08-11 PROCEDURE — 71045 X-RAY EXAM CHEST 1 VIEW: CPT | Mod: 26

## 2023-08-11 PROCEDURE — G1004: CPT

## 2023-08-11 PROCEDURE — 74177 CT ABD & PELVIS W/CONTRAST: CPT | Mod: 26,ME

## 2023-08-11 PROCEDURE — 99222 1ST HOSP IP/OBS MODERATE 55: CPT

## 2023-08-11 PROCEDURE — 78278 ACUTE GI BLOOD LOSS IMAGING: CPT | Mod: 26

## 2023-08-11 PROCEDURE — 99285 EMERGENCY DEPT VISIT HI MDM: CPT | Mod: GC

## 2023-08-11 RX ORDER — CEFTRIAXONE 500 MG/1
1000 INJECTION, POWDER, FOR SOLUTION INTRAMUSCULAR; INTRAVENOUS EVERY 24 HOURS
Refills: 0 | Status: DISCONTINUED | OUTPATIENT
Start: 2023-08-11 | End: 2023-08-12

## 2023-08-11 RX ORDER — FOLIC ACID 0.8 MG
1 TABLET ORAL DAILY
Refills: 0 | Status: DISCONTINUED | OUTPATIENT
Start: 2023-08-11 | End: 2023-08-11

## 2023-08-11 RX ORDER — PANTOPRAZOLE SODIUM 20 MG/1
80 TABLET, DELAYED RELEASE ORAL ONCE
Refills: 0 | Status: COMPLETED | OUTPATIENT
Start: 2023-08-11 | End: 2023-08-11

## 2023-08-11 RX ORDER — MIDODRINE HYDROCHLORIDE 2.5 MG/1
10 TABLET ORAL THREE TIMES A DAY
Refills: 0 | Status: DISCONTINUED | OUTPATIENT
Start: 2023-08-11 | End: 2023-08-25

## 2023-08-11 RX ORDER — POLYETHYLENE GLYCOL 3350 17 G/17G
17 POWDER, FOR SOLUTION ORAL
Refills: 0 | Status: DISCONTINUED | OUTPATIENT
Start: 2023-08-11 | End: 2023-08-16

## 2023-08-11 RX ORDER — MIDODRINE HYDROCHLORIDE 2.5 MG/1
10 TABLET ORAL
Refills: 0 | Status: DISCONTINUED | OUTPATIENT
Start: 2023-08-11 | End: 2023-08-14

## 2023-08-11 RX ORDER — HYDROCORTISONE 1 %
1 OINTMENT (GRAM) TOPICAL
Refills: 0 | Status: DISCONTINUED | OUTPATIENT
Start: 2023-08-11 | End: 2023-08-25

## 2023-08-11 RX ORDER — ACETAMINOPHEN 500 MG
1000 TABLET ORAL ONCE
Refills: 0 | Status: COMPLETED | OUTPATIENT
Start: 2023-08-11 | End: 2023-08-11

## 2023-08-11 RX ORDER — SODIUM CHLORIDE 9 MG/ML
100 INJECTION INTRAMUSCULAR; INTRAVENOUS; SUBCUTANEOUS
Refills: 0 | Status: DISCONTINUED | OUTPATIENT
Start: 2023-08-11 | End: 2023-08-25

## 2023-08-11 RX ORDER — PANTOPRAZOLE SODIUM 20 MG/1
40 TABLET, DELAYED RELEASE ORAL
Refills: 0 | Status: DISCONTINUED | OUTPATIENT
Start: 2023-08-11 | End: 2023-08-25

## 2023-08-11 RX ORDER — ESCITALOPRAM OXALATE 10 MG/1
5 TABLET, FILM COATED ORAL DAILY
Refills: 0 | Status: DISCONTINUED | OUTPATIENT
Start: 2023-08-11 | End: 2023-08-25

## 2023-08-11 RX ORDER — LEVOTHYROXINE SODIUM 125 MCG
137 TABLET ORAL DAILY
Refills: 0 | Status: DISCONTINUED | OUTPATIENT
Start: 2023-08-11 | End: 2023-08-25

## 2023-08-11 RX ORDER — FERROUS SULFATE 325(65) MG
325 TABLET ORAL DAILY
Refills: 0 | Status: DISCONTINUED | OUTPATIENT
Start: 2023-08-11 | End: 2023-08-11

## 2023-08-11 RX ORDER — SIMVASTATIN 20 MG/1
40 TABLET, FILM COATED ORAL AT BEDTIME
Refills: 0 | Status: DISCONTINUED | OUTPATIENT
Start: 2023-08-11 | End: 2023-08-25

## 2023-08-11 RX ORDER — METOPROLOL TARTRATE 50 MG
100 TABLET ORAL DAILY
Refills: 0 | Status: DISCONTINUED | OUTPATIENT
Start: 2023-08-11 | End: 2023-08-25

## 2023-08-11 RX ADMIN — PANTOPRAZOLE SODIUM 80 MILLIGRAM(S): 20 TABLET, DELAYED RELEASE ORAL at 03:25

## 2023-08-11 RX ADMIN — CEFTRIAXONE 100 MILLIGRAM(S): 500 INJECTION, POWDER, FOR SOLUTION INTRAMUSCULAR; INTRAVENOUS at 12:43

## 2023-08-11 RX ADMIN — Medication 400 MILLIGRAM(S): at 08:16

## 2023-08-11 RX ADMIN — Medication 1000 MILLIGRAM(S): at 09:00

## 2023-08-11 RX ADMIN — MIDODRINE HYDROCHLORIDE 10 MILLIGRAM(S): 2.5 TABLET ORAL at 20:50

## 2023-08-11 NOTE — H&P ADULT - ASSESSMENT
Liam Garza is 79 y.o. M with PMHx Significant for HLD, A-fib, BPH, ESRD, CANDY, who presents to the ED with c.o. abd pain, rectal pain, and bloody stools.  Per patient, he has been experiencing abd pain and rectal bleeding onset 1 day ago.  Patient notes pain is localized to the suprapubic area, radiates to the RLQ.  He notes pain is intermittent and has had minimal improvement with Tylenol.  Nothing seems to exacerbate his pain.  His only other associated symptom is chills.  Otherwise he denies fevers, N/V, HA, changes in his vision, sore throat, SOB, CP, diarrhea or constipation. Blood per rectum is accumulated in his depends. Of note, patient notes rectal pain began in May which she describes as sharp and has been constant since onset.  pt with hx of chronic a.fib, chf systolic EF 35 to 40% s/p ppm ,MICRA sec to ppm exrtacction sec to + BC.  sob sec acute on chronic chf systolc, fluid overload  renal eval possible HD today  ?rectal bleed hold AC, GI eval  ASHD / A. FIb continue beta blocker   repeat echo with hx of pericardial effusion  ckear liquid diet Liam Garza is 79 y.o. M with PMHx Significant for HLD, A-fib, BPH, ESRD, CANDY, who presents to the ED with c.o. abd pain, rectal pain, and bloody stools.  Per patient, he has been experiencing abd pain and rectal bleeding onset 1 day ago.  Patient notes pain is localized to the suprapubic area, radiates to the RLQ.  He notes pain is intermittent and has had minimal improvement with Tylenol.  Nothing seems to exacerbate his pain.  His only other associated symptom is chills.  Otherwise he denies fevers, N/V, HA, changes in his vision, sore throat, SOB, CP, diarrhea or constipation. Blood per rectum is accumulated in his depends. Of note, patient notes rectal pain began in May which she describes as sharp and has been constant since onset.  pt with hx of chronic a.fib, chf systolic EF 35 to 40% s/p ppm ,MICRA sec to ppm exrtacction sec to + BC.  sob sec acute on chronic chf systolc, fluid overload  renal eval possible HD today  ?rectal bleed hold AC, GI eval  ASHD / A. FIb continue beta blocker   repeat echo with hx of pericardial effusion  clear liquid diet  ct ?colitis/ + ua start on abx

## 2023-08-11 NOTE — CONSULT NOTE ADULT - ASSESSMENT
a/p colitis  constipation    plan  in and out  ppi once a day  bowel regimen  gerd precautions  start bowel regimen  add lactulose 10cc q 6 hours and glycerin suppository  gas pill with simethicone and maalox q 6 hours  monitor stool output      Advanced care planning was discussed with patient and family.  Advanced care planning forms were reviewed and discussed.  Risks, benefits and alternatives of gastroenterologic procedures were discussed in detail and all questions were answered.    30 minutes spent.   a/p colitis  constipation    plan  in and out  ppi once a day  bowel regimen  gerd precautions  start bowel regimen  add lactulose 10cc q 6 hours and glycerin suppository  gas pill with simethicone and maalox q 6 hours  monitor stool output  consider ct angio d/w dr saenz maybe a lowere maisha bleed  dr newby aware  hd to follow the ct angio    Advanced care planning was discussed with patient and family.  Advanced care planning forms were reviewed and discussed.  Risks, benefits and alternatives of gastroenterologic procedures were discussed in detail and all questions were answered.    30 minutes spent.

## 2023-08-11 NOTE — ED PROVIDER NOTE - ATTENDING CONTRIBUTION TO CARE
I, Dr. Laure Brooks, have personally performed a face to face medical and diagnostic evaluation of the patient. I have discussed with and reviewed the Resident's and/or ACP's and/or Medical/PA/NP student's note and agree with the History, ROS, Physical Exam and MDM unless otherwise indicated. A brief summary of my personal evaluation and impression can be found below.    Patient is a 71-year-old male with history of hyperlipidemia, A-fib on Xarelto, BPH, ESRD, presenting with diffuse abdominal pain, pain with defecation and bloody stools since yesterday.  Pain primarily in the suprapubic region radiating to right lower quadrant with some improvement with Tylenol.  Feeling generally fatigued.  Denies fevers, chills, nausea, vomiting.  Does have history of hemorrhoids requiring excision.    Patient well-appearing, clear lungs, normal heart sounds, abdomen is soft with mild suprapubic tenderness, no rebound or guarding, rectal exam performed with PIERRE Aggarwal, dark stool with associated dried bright red blood, no active hemorrhage appreciated, no hemorrhoids.  Concern is for GI bleed 2/2 PUD versus gastritis versus colitis versus diverticulitis.  Will obtain labs, CT abdomen pelvis to assess for brisk bleeding, and admit for GI eval.  Will hold off on anticoagulation reversal given patient is hemodynamically stable without obvious significant hemorrhage on exam.

## 2023-08-11 NOTE — PATIENT PROFILE ADULT - FALL HARM RISK - HARM RISK INTERVENTIONS

## 2023-08-11 NOTE — ED ADULT NURSE NOTE - OBJECTIVE STATEMENT
PT is a 77yo m bibems c/o abd pain and rectal bleeding. Pt states that he has been having worsening abd pain and noticed rectal bleeding while using bathroom. PT states that h has not noticed any other bleeding from urinary tract. PT states that he has been having HA and runny nose as well. PMH of HTN, hypothyroid, pacemaker. PT A,Ox4, nonambulatory at baseline, pt placed on cardiac monitor. Respirations even and unlabored, abd soft, nondistended and nontender, skin warm, dry and intact, HOLMAN. Denies HA, CP, SOB, n/v/d, fever, chills and urinary symptoms. PT is a 79yo m bibems c/o abd pain x 1 day and rectal bleeding x 3 days. Pt states that he has been having worsening abd pain and noticed rectal bleeding while using bathroom. PT states that h has noticed any other bleeding from urinary tract. PT states that he has been having HA and runny nose as well. {PT states dialysis monday, Friday, right chest dialysis port. PT endorses hematuria and dysuria approx 1 month ago. PMH of HTN, hypothyroid, pacemaker. PT A,Ox4, nonambulatory at baseline, pt placed on cardiac monitor. Respirations even and unlabored, low oxygen saturation placed on 2L NC, abd soft, nondistended and tender upon palpation, skin warm, dry, HOLMAN. Denies HA, CP, SOB, n/v/d, fever. Stretcher locked in lowest position, appropriate side rails up for safety, pt instructed to call for RN if anything needed.

## 2023-08-11 NOTE — CHART NOTE - NSCHARTNOTEFT_GEN_A_CORE
Medicine NP Note     JANEE GARZA  MRN-[id number]  Allergies    Myrbetriq (Hives)  tamsulosin (Hives)  alfuzosin (Hives)  levofloxacin (Hives)    Intolerances     Pt noted to have rectal bleeding during exam. Pt states bleeding for at least 4 days. Reports severe rectal pain . No diarrhea, no nausea or vomiting. NO abdominal pain reported today. Pt states he has been seen in the past for some rectal issues that he can not elaborate on.     Vital Signs Last 24 Hrs  T(C): 36.9 (08-11-23 @ 06:15), Max: 36.9 (08-11-23 @ 06:15)  T(F): 98.4 (08-11-23 @ 06:15), Max: 98.4 (08-11-23 @ 06:15)  HR: 77 (08-11-23 @ 06:15) (60 - 77)  BP: 128/62 (08-11-23 @ 06:15) (128/62 - 160/78)  BP(mean): --  RR: 20 (08-11-23 @ 06:15) (18 - 20)  SpO2: 96% (08-11-23 @ 06:15) (88% - 96%)  Daily Height in cm: 170.18 (11 Aug 2023 00:36)    Daily   I&O's Summary    CAPILLARY BLOOD GLUCOSE                          12.4   7.14  )-----------( 152      ( 11 Aug 2023 01:23 )             40.3     08-11    138  |  100  |  39<H>  ----------------------------<  81  3.4<L>   |  24  |  2.33<H>    Ca    10.6<H>      11 Aug 2023 01:23  Mg     1.7     08-11    TPro  7.0  /  Alb  3.4  /  TBili  0.5  /  DBili  x   /  AST  19  /  ALT  12  /  AlkPhos  137<H>  08-11    PT/INR - ( 11 Aug 2023 01:23 )   PT: 23.5 sec;   INR: 2.29 ratio         PTT - ( 11 Aug 2023 01:23 )  PTT:36.4 sec    Radiology: CT abdominal with IV contrast: Abundant fecal material within the rectum with mild wall thickening and   trace surrounding stranding. Recommend clinical correlation to assess for stercoral colitis. No bowel obstruction.    Cholelithiasis.    Findings of pulmonary edema with moderate right and small left pleural   effusion.        PHYSICAL EXAM:  GENERAL: NAD  HEAD:  Atraumatic, Normocephalic  EYES: EOMI, PERRLA, conjunctiva and sclera clear  NECK: Supple, No JVD  CHEST/LUNG: decreased BS   HEART: Regular rate and rhythm;  ABDOMEN: Soft, Nontender, Nondistended; Bowel sounds present     Rectal inflammation noted with bleeding at rectal site and coming from rectum   EXTREMITIES:  LE skin changes, mild edema   PSYCH: AAOx3  NEUROLOGY: non-focal  SKIN: No rashes or lesions    Assessment/Plan:  Janee Garza is 79 y.o. M with PMHx Significant for HLD, A-fib, BPH, ESRD, CANDY, admitted with rectal bleeding, now persistently bleeding noted     1. GIB- noted rectal bleeding, unclear if high bleed present. BRBPR. Discussed with Dr Mancini who recommends surgical consult ( He has reached out to Dr. Torres) and NM bleeding scan to see if slow bleed can be identified. Dr. Christensen requesting IR consult , Discussed with IR who will await surgical team recommendations as well.   Pt remains Stable. Repeat H/H , INR now.   Ra for NM bleeding scan    2. ESRD/HD - for HD today. I have reached out to [facility] for HD orders. They will follow.

## 2023-08-11 NOTE — CONSULT NOTE ADULT - ASSESSMENT
78  year old male    h/o  CAD s/p stent , asa.     A-fib/ PPM, , hypothyroid, and total left knee replacement    prior ppm  interrogation  with  WCT   Ct chest, retrosternal hematoma.  mod left pl effusion/ has   c/c leg  pain/  h/o  non complaince  with meds       admitted  with  abd pain and rectal bleeding onset 1 day ago.. arrive s form n home     has  no pain  now    c/c   systolic  and  diastolic  chf,/  h/o  l/edema  of L  leg    h/o    c/c AFIB,   has  PPM         eliquis   on  hold  now      h/o c/c   Anemia, , hb is  12.  gi   known to  dr joann INFANTE,     cath, with 2 vessel disease,  s/p  CABG and  MVR  surg d r marni      echo,  on 7/2022,  ef  35/ new/ severe  TR,  and ,  cath,   was non  obstructive     s/p   MSSA  bacterinia           was  on iv ancef..  s/p   explant pf  ppm  and  Micra  placement on  3/30/23. dr sunitha rocha    Echo,  3/2023,  ef  25,  mod  AI. severe  TR . RV hypokinesis    BEULAH, from  AIN, dx  recently , ancef / was  switched   to iv vanco,   CKD, on HD,  has  right Permcath    s/p  renal bx on 4/ 14.. 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 + , Ig A  Lamda. ,  known to heme  dr dobson.  pe r heme,  no  need  for  b marrow  bx    cxr/  CT chest,  pl  effusion,  will need  HD  gi  for  rectal  bleeding   wife  at bedside                   card /  dr mikayla dick       < from: Intra-Operative Transesophageal Echo W or WO Ultrasound Enhancing Agent (03.30.23 @ 12:48) >  --------------------------------------------------------------------------------  PRE-BYPASS FINDINGS  Left Ventricle:  Left ventricular ejection fraction is estimated at 25 to 30%. There is severe kypokinesis in the left ventricle.  Right Ventricle:  Moderately reduced systolic function. Moderately reduced systolic function. There is moderate hypokinesis in the right ventricle.  Left Atrium:  Diffuse smoke visualized in LA.  Aortic Valve:  The aortic valve appears trileaflet. There is moderate aortic regurgitation.  No obvious vegetations visualized  Mitral Valve:  Bioprosthetic valve is present in the mitral position. There is calcification of the mitral valve annulus.  No obvious vegetations visualized.  Tricuspid Valve:  There is moderate-severe tricuspid regurgitation. TR unchanged following lead extraction.  Pulmonic Valve:  There is mild pulmonic regurgitation.  Pericardium:  No pericardial effusion seen. No pericardial effusion visualized before and after lead extraction.  Electronically signed by Jimmy Gambino on 3/30/2023 at 3:00:58 PM  *** Final ***  < end of copied text >                                  78  year old male    h/o  CAD s/p stent , asa.     A-fib/ PPM, , hypothyroid, and total left knee replacement    prior ppm  interrogation  with  WCT   Ct chest, retrosternal hematoma.  mod left pl effusion/ has   c/c leg  pain/  h/o  non complaince  with meds       admitted  with  abd pain and rectal bleeding onset 1 day ago.. arrive s form n home     has  no pain  now    c/c   systolic  and  diastolic  chf,,  cardiomyopathy, h/o  l/edema  of L  leg    h/o    c/c AFIB,   has  PPM         eliquis   on  hold  now      h/o c/c   Anemia, , hb is  12.  gi   known to  dr joann INFANTE,     cath, with 2 vessel disease,  s/p  CABG and  MVR  surg d r marni      echo,  on 7/2022,  ef  35/ new/ severe  TR,  and ,  cath,   was non  obstructive     s/p   MSSA  bacterinia           was  on iv ancef..  s/p   explant pf  ppm  and  Micra  placement on  3/30/23. dr sunitha rocha    Echo,  3/2023,  ef  25,  mod  AI. severe  TR . RV hypokinesis    BEULAH, from  AIN, dx  recently , ancef / was  switched   to iv vanco,   CKD, on HD,  has  right Permcath.  hpuse renal   s/p  renal bx on 4/ 14.. 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 + , Ig A  Lamda. ,  known to heme  dr dobson.  pe r heme,  no  need  for  b marrow  bx    cxr/  CT chest,  pl  effusion,  will need  HD  gi  for  rectal  bleeding   wife  at bedside                   card /  dr mikayla dick       < from: Intra-Operative Transesophageal Echo W or WO Ultrasound Enhancing Agent (03.30.23 @ 12:48) >  --------------------------------------------------------------------------------  PRE-BYPASS FINDINGS  Left Ventricle:  Left ventricular ejection fraction is estimated at 25 to 30%. There is severe kypokinesis in the left ventricle.  Right Ventricle:  Moderately reduced systolic function. Moderately reduced systolic function. There is moderate hypokinesis in the right ventricle.  Left Atrium:  Diffuse smoke visualized in LA.  Aortic Valve:  The aortic valve appears trileaflet. There is moderate aortic regurgitation.  No obvious vegetations visualized  Mitral Valve:  Bioprosthetic valve is present in the mitral position. There is calcification of the mitral valve annulus.  No obvious vegetations visualized.  Tricuspid Valve:  There is moderate-severe tricuspid regurgitation. TR unchanged following lead extraction.  Pulmonic Valve:  There is mild pulmonic regurgitation.  Pericardium:  No pericardial effusion seen. No pericardial effusion visualized before and after lead extraction.  Electronically signed by Jimmy Gambino on 3/30/2023 at 3:00:58 PM  *** Final ***  < end of copied text >                                  78  year old male    h/o  CAD s/p stent , asa.     A-fib/ PPM, , hypothyroid, and total left knee replacement    prior ppm  interrogation  with  WCT   Ct chest, retrosternal hematoma.  mod left pl effusion/ has   c/c leg  pain/  h/o  non complaince  with meds       admitted  with  abd pain and rectal bleeding onset 1 day ago.. arrive s form n home     has  no pain  now    c/c   systolic  and  diastolic  chf,,  cardiomyopathy, h/o  l/edema  of L  leg    h/o    c/c AFIB,   has  PPM         eliquis   on  hold  now      h/o c/c   Anemia, , hb is  12.  gi   known to  dr joann INFANTE,     cath, with 2 vessel disease,  s/p  CABG and  MVR  surg d r marni      echo,  on 7/2022,  ef  35/ new/ severe  TR,  and ,  cath,   was non  obstructive     s/p   MSSA  bacterinia , in march 2023          was  on iv ancef..  s/p   explant pf  ppm  and  Micra  placement on  3/30/23. dr sunitha rocha  ?  cauti,  on iv rocephin/  follow   cx .  ha s  c/c  delgadillo    Echo,  3/2023,  ef  25,  mod  AI. severe  TR . RV hypokinesis    BEULAH, from  AIN, dx  recently , ancef / was  switched   to iv vanco,   CKD, on HD,  has  right Permcath.  hpuse renal   s/p  renal bx on 4/ 14.. 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 + , Ig A  Lamda. ,  known to heme  dr dobson.  pe r heme,  no  need  for  b marrow  bx    cxr/  CT chest,  pl  effusion,  will need  HD  gi  for  rectal  bleeding   wife  at bedside                   card /  dr mikayla dick       < from: Intra-Operative Transesophageal Echo W or WO Ultrasound Enhancing Agent (03.30.23 @ 12:48) >  --------------------------------------------------------------------------------  PRE-BYPASS FINDINGS  Left Ventricle:  Left ventricular ejection fraction is estimated at 25 to 30%. There is severe kypokinesis in the left ventricle.  Right Ventricle:  Moderately reduced systolic function. Moderately reduced systolic function. There is moderate hypokinesis in the right ventricle.  Left Atrium:  Diffuse smoke visualized in LA.  Aortic Valve:  The aortic valve appears trileaflet. There is moderate aortic regurgitation.  No obvious vegetations visualized  Mitral Valve:  Bioprosthetic valve is present in the mitral position. There is calcification of the mitral valve annulus.  No obvious vegetations visualized.  Tricuspid Valve:  There is moderate-severe tricuspid regurgitation. TR unchanged following lead extraction.  Pulmonic Valve:  There is mild pulmonic regurgitation.  Pericardium:  No pericardial effusion seen. No pericardial effusion visualized before and after lead extraction.  Electronically signed by Jimmy Gambino on 3/30/2023 at 3:00:58 PM  *** Final ***  < end of copied text >                                  78  year old male    h/o  CAD s/p stent , asa.     A-fib/ PPM, , hypothyroid, and total left knee replacement    prior ppm  interrogation  with  WCT   Ct chest, retrosternal hematoma.  mod left pl effusion/ has   c/c leg  pain/  h/o  non complaince  with meds       admitted  with  abd pain and rectal bleeding onset 1 day ago.. arrive s form n home     has  no pain  now    c/c   systolic  and  diastolic  chf,,  cardiomyopathy,. Mitral  valvuloplasty ,  h/o  c/c  l/edema  of L  leg    h/o    c/c AFIB,   has  PPM         eliquis   on  hold  now      h/o c/c   Anemia, , hb is  12.  gi   known to  dr joann INFANTE,     cath, with 2 vessel disease,  s/p  CABG and  MVR  surg d r marni      echo,  on 7/2022,  ef  35/ new/ severe  TR,  and ,  cath,   was non  obstructive     s/p   MSSA  bacterinia , in march 2023          was  on iv ancef..  s/p   explant pf  ppm  and  Micra  placement on  3/30/23. dr sunitha rocha  ?  cauti,  on iv rocephin/  follow   cx .  ha s  c/c  delgadillo    Echo,  3/2023,  ef  25,  mod  AI. severe  TR . RV hypokinesis    BEULAH, from  AIN, dx  recently , ancef / was  switched   to iv vanco,   CKD, on HD,  has  right Permcath.  hpuse renal   s/p  renal bx on 4/ 14.. 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 + , Ig A  Lamda. ,  known to heme  dr dobson.  pe r heme,  no  need  for  b marrow  bx    cxr/  CT chest,  pl  effusion,  will need  HD  gi  for  rectal  bleeding/  wife  at  bedside,  wants  [address]    discussed  with  team                    card /  dr mikayla dick       < from: Intra-Operative Transesophageal Echo W or WO Ultrasound Enhancing Agent (03.30.23 @ 12:48) >  --------------------------------------------------------------------------------  PRE-BYPASS FINDINGS  Left Ventricle:  Left ventricular ejection fraction is estimated at 25 to 30%. There is severe kypokinesis in the left ventricle.  Right Ventricle:  Moderately reduced systolic function. Moderately reduced systolic function. There is moderate hypokinesis in the right ventricle.  Left Atrium:  Diffuse smoke visualized in LA.  Aortic Valve:  The aortic valve appears trileaflet. There is moderate aortic regurgitation.  No obvious vegetations visualized  Mitral Valve:  Bioprosthetic valve is present in the mitral position. There is calcification of the mitral valve annulus.  No obvious vegetations visualized.  Tricuspid Valve:  There is moderate-severe tricuspid regurgitation. TR unchanged following lead extraction.  Pulmonic Valve:  There is mild pulmonic regurgitation.  Pericardium:  No pericardial effusion seen. No pericardial effusion visualized before and after lead extraction.  Electronically signed by Jimmy Gambino on 3/30/2023 at 3:00:58 PM  *** Final ***  < end of copied text >

## 2023-08-11 NOTE — CHART NOTE - NSCHARTNOTEFT_GEN_A_CORE
Chart reviewed. Labs reviewed.    Briefly,   Liam Garza is 79 y.o. M with PMHx significant for HLD, A-fib, BPH, ESRD, CANDY, who presents to the ED with c.o. abd pain, rectal pain, and bloody stools. CT A/P showed proctocolitis. No surgical intervention. VSS. Hgb stable. Electrolytes wnl.    Patient is new to HD since May 2023, was getting HD at rehab [facility]. Attempted to see the patient today but had gone for imaging. No indication for urgent HD today. Will place HD orders for tomorrow. Formal consult to follow in the AM. Chart reviewed. Labs reviewed.    Briefly,   Liam Garza is 79 y.o. M with PMHx significant for HLD, A-fib, BPH, ESRD, CANDY, who presents to the ED with c.o. abd pain, rectal pain, and bloody stools. CT A/P showed proctocolitis. No surgical intervention. Also evidence of pleural effusion. Hgb stable. Electrolytes wnl. BP stable. however, patient is requiring 3L O2    Patient is new to HD since May 2023, was getting HD at rehab [facility]. Attempted to see the patient today but had gone for imaging. No indication for urgent HD today. Will dialyze today more for fluid removal. Formal consult to follow in the AM.

## 2023-08-11 NOTE — ED ADULT NURSE NOTE - NSFALLUNIVINTERV_ED_ALL_ED
Bed/Stretcher in lowest position, wheels locked, appropriate side rails in place/Call bell, personal items and telephone in reach/Instruct patient to call for assistance before getting out of bed/chair/stretcher/Non-slip footwear applied when patient is off stretcher/[place] to call system/Physically safe environment - no spills, clutter or unnecessary equipment/Purposeful proactive rounding/Room/bathroom lighting operational, light cord in reach

## 2023-08-11 NOTE — H&P ADULT - HISTORY OF PRESENT ILLNESS
Date of Service, 08-11-23 @ 08:31  CHIEF COMPLAINT:Patient is a 78y old  Male who presents with a chief complaint of  SOB    HPI:  Liam Garza is 79 y.o. M with PMHx Significant for HLD, A-fib, BPH, ESRD, CANDY, who presents to the ED with c.o. abd pain, rectal pain, and bloody stools.  Per patient, he has been experiencing abd pain and rectal bleeding onset 1 day ago.  Patient notes pain is localized to the suprapubic area, radiates to the RLQ.  He notes pain is intermittent and has had minimal improvement with Tylenol.  Nothing seems to exacerbate his pain.  His only other associated symptom is chills.  Otherwise he denies fevers, N/V, HA, changes in his vision, sore throat, SOB, CP, diarrhea or constipation. Blood per rectum is accumulated in his depends. Of note, patient notes rectal pain began in May which she describes as sharp and has been constant since onset.    PAST MEDICAL & SURGICAL HISTORY:  Afib  not on anticoagulation  CAD (coronary artery disease)  Varicose vein of leg  Hypothyroid  Pacemaker  H/O fracture of hip  2017  H/O asbestosis  HTN (hypertension)  Stented coronary artery  drug eluding stent 5/2007  H/O detached retina repair  S/P cataract surgery  History of hip surgery  left hip ORIF  H/O varicose vein stripping  1993 left leg  History of left knee replacement  2013 - multiple infections post op requiring reoperation in 2015, 2017, 2019)  H/O foot surgery  for infection of right foot 2019  H/O inguinal hernia repair      MEDICATIONS  (STANDING):  vancomycin 500 mg/100 mL intravenous solution: 500 milligram(s) intravenously Monday, Wednesday, and Friday TO BE GIVEN ON POST HD DAYS UNTIL 5/12  · 	midodrine 10 mg oral tablet: 1 tab(s) orally 3 times a day  · 	apixaban 5 mg oral tablet: 1 tab(s) orally every 12 hours  · 	nystatin 100,000 units/g topical cream: 1 application topically 2 times a day  · 	hydrocortisone 1% topical cream: 1 application topically once a day  · 	clotrimazole 1% topical cream: 1 application topically 2 times a day  · 	metoprolol succinate 100 mg oral tablet, extended release: 1 tab(s) orally once a day  · 	ferrous sulfate 325 mg (65 mg elemental iron) oral tablet: 1 tab(s) orally once a day  · 	folic acid 1 mg oral tablet: 1 tab(s) orally once a day  · 	escitalopram 5 mg oral tablet: 1 tab(s) orally once a day  · 	Synthroid 137 mcg (0.137 mg) oral tablet: 1 tab(s) orally once a day  · 	Vitamin D3 2000 intl units (50 mcg) oral tablet: 1  orally once a day  · 	aspirin 81 mg oral tablet: 1  orally once a day  · 	Vytorin 10 mg-40 mg oral tablet: 1 tab(s) orally once a day  MEDICATIONS  (PRN):      FAMILY HISTORY:  FH: skin cancer (Father)        SOCIAL HISTORY:    [ ] Non-smoker  [ ] Smoker  [ ] Alcohol    Allergies    Myrbetriq (Hives)  tamsulosin (Hives)  alfuzosin (Hives)  levofloxacin (Hives)    Intolerances    	    REVIEW OF SYSTEMS:  CONSTITUTIONAL: No fever, weight loss, or fatigue  EYES: No eye pain, visual disturbances, or discharge  ENT:  No difficulty hearing, tinnitus, vertigo; No sinus or throat pain  NECK: No pain or stiffness  RESPIRATORY: No cough, wheezing, chills or hemoptysis; No Shortness of Breath  CARDIOVASCULAR: No chest pain, palpitations, passing out, dizziness, or leg swelling  GASTROINTESTINAL: No abdominal or epigastric pain. No nausea, vomiting, or hematemesis; No diarrhea or constipation. + bleed  GENITOURINARY: No dysuria, frequency, hematuria, or incontinence  NEUROLOGICAL: No headaches, memory loss, loss of strength, numbness, or tremors  SKIN: No itching, burning, rashes, or lesions   LYMPH Nodes: No enlarged glands  ENDOCRINE: No heat or cold intolerance; No hair loss  MUSCULOSKELETAL: No joint pain or swelling; No muscle, back, or extremity pain  PSYCHIATRIC: No depression, anxiety, mood swings, or difficulty sleeping  HEME/LYMPH: No easy bruising, or bleeding gums  ALLERGY AND IMMUNOLOGIC: No hives or eczema	    [x ] All others negative	  [ ] Unable to obtain    PHYSICAL EXAM:  T(C): 36.9 (08-11-23 @ 06:15), Max: 36.9 (08-11-23 @ 06:15)  HR: 77 (08-11-23 @ 06:15) (60 - 77)  BP: 128/62 (08-11-23 @ 06:15) (128/62 - 160/78)  RR: 20 (08-11-23 @ 06:15) (18 - 20)  SpO2: 96% (08-11-23 @ 06:15) (88% - 96%)  Wt(kg): --  I&O's Summary      Appearance: Normal	  HEENT:   Normal oral mucosa, PERRL, EOMI	  Lymphatic: No lymphadenopathy  Cardiovascular: Normal S1 S2, No JVD, + murmurs, No edema  Respiratory:rhonchi  Psychiatry: A & O x 3, Mood & affect appropriate  Gastrointestinal:  Soft, + mildly tender, + BS	  Skin: No rashes, No ecchymoses, No cyanosis	  Neurologic: Non-focal  Extremities: Normal range of motion, No clubbing, cyanosis or edema  Vascular: + pvd                        12.4   7.14  )-----------( 152      ( 11 Aug 2023 01:23 )             40.3     08-11    138  |  100  |  39<H>  ----------------------------<  81  3.4<L>   |  24  |  2.33<H>    Ca    10.6<H>      11 Aug 2023 01:23  Mg     1.7     08-11    TPro  7.0  /  Alb  3.4  /  TBili  0.5  /  DBili  x   /  AST  19  /  ALT  12  /  AlkPhos  137<H>  08-11    proBNP:   Lipid Profile:   HgA1c:   TSH:   PT/INR - ( 11 Aug 2023 01:23 )   PT: 23.5 sec;   INR: 2.29 ratio         PTT - ( 11 Aug 2023 01:23 )  PTT:36.4 sec    PREVIOUS DIAGNOSTIC TESTING:    · ST/T Wave: twi aVL and V2, changed from March 2023  · QTC: 470  · QRS: 112  · Acute or Evolving MI?: no   · Abnormal Rhythm: atrial fibrillation   · Interpretation: abnormal   · Rate: 63   · EKG Date/Time: 11-Aug-2023 01:32      < from: CT Abdomen and Pelvis w/ IV Cont (08.11.23 @ 04:32) >  Abundant fecal material within the rectum with mild wall thickening and   trace surrounding stranding. Recommend clinical correlation to assess for   stercoral colitis. No bowel obstruction.    Cholelithiasis.    Findings of pulmonary edema with moderate right and small left pleural   effusion.  < from: TTE W or WO Ultrasound Enhancing Agent (03.29.23 @ 14:59) >   1. Mildly dilated left ventricular cavity size. The left ventricular wall thickness is normal. The left ventricular systolic function is moderately decreased with an ejection fraction visually estimated at 35 to 40 %.   2. The left ventricular diastolic function is indeterminate.   3. Normal right ventricular cavity size, normal right ventricular wall thickness and mildly reduced systolic function. The tricuspid annular plane systolic excursion (TAPSE) is 0.9 cm (normal >=1.7 cm).   4. The left atrium is severely dilated.   5. Bioprosthetic valve present in the mitral position. No prosthetic valvular regurgitation.   6. The aortic annulus and aortic root appear normal in size. The aortic root at sinuses of Valsalva is borderline dilated.   7. No pericardial effusion seen.   8. Unable to rule out endocarditis.   9. Compared to the transthoracic echocardiogram performed on 7/5/2022 there have been no significant interval changes.  10. Estimated pulmonary artery systolic pressure is 37 mmHg.  11. No echocardiographic evidence of vegetations.

## 2023-08-11 NOTE — H&P ADULT - NSHPLABSRESULTS_GEN_ALL_CORE
s/p CABG and MVR 8/30  no overt gi bleed  denies melena/hematochezia  hgb stable  had egd/colon 12/2020 (cher metha)  cbc daily  transfuse PRN  s/p PPM generator change   cardio following   CXR noted   dc planning as per primary   will follow   dw patient

## 2023-08-11 NOTE — CONSULT NOTE ADULT - PROBLEM SELECTOR RECOMMENDATION 9
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 SCr currently 2.3, had a Scr of 1 in April 2023.     Last HD as outpatient was done on 8/7/23 via R IJ tunneled HD catheter. Labs reviewed. Pt. clinically stable but is requiring 3L NC with notable pleural effusion on CT abd/pel (R>L). Will arrange for maintenance HD today with 1L UF as tolerated. Discussed with the patient that he will require RRT/HD, risks and benefits associated with RRT/HD explained at length. HD consent obtained and kept in patients chart. Monitor labs. Dose meds as per HD.    Please give midodrine 10mg prior to HD. Reconcile if he is still on 10mg TID as outpatient.    Please check serum phosphorus and PTH.    Anemia- likely multifactorial. Hgb currently 12. Would need to reconcile if he is on any ISREAL therapy. Would hold for now given Hgb >11. Further management per primary team.

## 2023-08-11 NOTE — CONSULT NOTE ADULT - ASSESSMENT
ASSESSMENT: 79 y.o. M with PMHx Significant for HLD, A-fib on xarelto, BPH, ESRD, CANDY, who presents to the ED with c.o. abd pain, rectal pain, and bloody stools. Known by colorectal surgery for hemorrhoidal rectal bleeding. CT w/ findings of abundant fecal material in rectum and proctocolitis which is likely the source of bleeding, not his hemorrhoids.    RECOMMENDATIONS:  - No acute surgical intervention  - Medicine admission  - Aggressive bowel regimen: fleet enema, miralax, golytely  - Fecal disimpaction for stool ball in rectum  - No IR intervention needed at this time - hemorrhoidal bleeding is unlikely at this time  - GI eval - possible scope once fecal burden improves  - Colorectal surgery will follow      Patient discussed with colorectal surgery attending, Dr. Torres.    Joanna Gtz, PGY2  [facility]  [phone]

## 2023-08-11 NOTE — ED ADULT NURSE NOTE - NS ED NURSE LEVEL OF CONSCIOUSNESS ORIENTATION
69 yo with history of colon cancer in 2006.  Surveillance colonoscopy in 2016 demonstrating high risk polyp that required surgical removal.  His father had colon cancer at age 60.  
Oriented - self; Oriented - place; Oriented - time

## 2023-08-11 NOTE — ED PROVIDER NOTE - CLINICAL SUMMARY MEDICAL DECISION MAKING FREE TEXT BOX
Liam Garza is 79 y.o. M with PMHx Significant for HLD, A-fib, BPH, ESRD, CANDY, who presents to the ED with c.o. abd pain, rectal pain, and bloody stools. Per HPI and physical exam concerning differentials include but not limited to peptic ulcer disease, upper GI bleed, appendicitis, colitis. Labs including CBC, CMP, lactate and CT angio will be ordered to identify potential source of melena.

## 2023-08-11 NOTE — CONSULT NOTE ADULT - ASSESSMENT
Interventional Radiology    Evaluate for Procedure:     HPI: 78 y.o. M with PMHx Significant for HLD, A-fib on xarelto, BPH, ESRD, CANDY, who presents to the ED with c.o. abd pain, rectal pain, and bloody stools. Known by colorectal surgery for hemorrhoidal rectal bleeding. CT w/ findings of abundant fecal material in rectum and proctocolitis. IR c/s for possible hemorrhoidal artery embolization.    Allergies: Myrbetriq (Hives)  tamsulosin (Hives)  alfuzosin (Hives)  levofloxacin (Hives)    Medications (Abx/Cardiac/Anticoagulation/Blood Products)    cefTRIAXone   IVPB: 100 mL/Hr IV Intermittent (08-11 @ 12:43)    Data:  170.2  81.6  T(C): 36.7  HR: 73  BP: 112/51  RR: 20  SpO2: 94%    -WBC 7.14 / HgB 12.4 / Hct 40.3 / Plt 152  -Na 138 / Cl 100 / BUN 39 / Glucose 81  -K 3.4 / CO2 24 / Cr 2.33  -ALT 12 / Alk Phos 137 / T.Bili 0.5  -INR 2.29 / PTT 36.4      Radiology:     Assessment/Plan:   78 y.o. M with PMHx Significant for HLD, A-fib on xarelto, BPH, ESRD, CANDY, who presents to the ED with c.o. abd pain, rectal pain, and bloody stools. Known by colorectal surgery for hemorrhoidal rectal bleeding. CT w/ findings of abundant fecal material in rectum and proctocolitis. IR c/s for possible hemorrhoidal artery embolization.    -- Agree with surgery. Bleeding likely secondary to proctocolitis. As such, no role for acute IR intervention at this time. Recommend patient follow up outpatient for possible embolization. If patient clinical status changes, and IR intervention be warranted, please reach out and can re-evaluate at that time.    Severino Mc M.D.  PGY4/R3, Interventional Radiology Resident    -Available on [employer] TEAMS for all non-urgent questions  -Emergent issues: [facility]-p.[phone]; [facility]-p.[phone] ([phone])  -Non-emergent consults: Please place a [place] order "Consult-Interventional Radiology" with an appropriate callback number  -Scheduling questions: [facility]: [phone]; [facility]: [phone]  -Clinic/Outpatient booking: [facility]: [phone]; [facility]: [phone]      Interventional Radiology    Evaluate for Procedure:     HPI: 78 y.o. M with PMHx Significant for HLD, A-fib on xarelto, BPH, ESRD, CANDY, who presents to the ED with c.o. abd pain, rectal pain, and bloody stools. Known by colorectal surgery for hemorrhoidal rectal bleeding. CT w/ findings of abundant fecal material in rectum and proctocolitis. IR c/s for possible hemorrhoidal artery embolization.    Allergies: Myrbetriq (Hives)  tamsulosin (Hives)  alfuzosin (Hives)  levofloxacin (Hives)    Medications (Abx/Cardiac/Anticoagulation/Blood Products)    cefTRIAXone   IVPB: 100 mL/Hr IV Intermittent (08-11 @ 12:43)    Data:  170.2  81.6  T(C): 36.7  HR: 73  BP: 112/51  RR: 20  SpO2: 94%    -WBC 7.14 / HgB 12.4 / Hct 40.3 / Plt 152  -Na 138 / Cl 100 / BUN 39 / Glucose 81  -K 3.4 / CO2 24 / Cr 2.33  -ALT 12 / Alk Phos 137 / T.Bili 0.5  -INR 2.29 / PTT 36.4      Radiology:     Assessment/Plan:   78 y.o. M with PMHx Significant for HLD, A-fib on xarelto, BPH, ESRD, CANDY, who presents to the ED with c.o. abd pain, rectal pain, and bloody stools. Known by colorectal surgery for hemorrhoidal rectal bleeding. CT w/ findings of abundant fecal material in rectum and proctocolitis. IR c/s for possible hemorrhoidal artery embolization.    -- Agree with surgery. Bleeding likely secondary to proctocolitis. As such, no role for acute IR intervention at this time. Recommend patient follow up outpatient for possible embolization. If patient clinical status changes, and IR intervention be warranted, please reach out and can re-evaluate at that time.      Severino Mc M.D.  PGY4/R3, Interventional Radiology Resident    -Available on [employer] TEAMS for all non-urgent questions  -Emergent issues: [facility]-p.[phone]; [facility]-p.[phone] ([phone])  -Non-emergent consults: Please place a [place] order "Consult-Interventional Radiology" with an appropriate callback number  -Scheduling questions: [facility]: [phone]; [facility]: [phone]  -Clinic/Outpatient booking: [facility]: [phone]; [facility]: [phone]

## 2023-08-11 NOTE — CONSULT NOTE ADULT - ASSESSMENT
79 m with  A-fib, CAD, MS s/p CABG and MVR, BPH, ESRD on HD via R permacath, recent MSSA bacteremia 3/69432 s/p PPM extraction and Micra now p/w rectal bleed and loss of appetite, no fever but stated that has had dysuria for a while I  here afebrile, WBC: 7 Hgb: 12  u/a positive  CT: Abundant fecal material within the rectum with mild wall thickening and trace surrounding stranding. Recommend clinical correlation to assess for stercoral colitis. No bowel obstruction. Cholelithiasis. Findings of pulmonary edema with moderate right and small left pleural   effusion.    rectal bleed, CT with ?stercoral colitis  dysuria and positive u/a, UTI, no fever or WBC, has h/p MVR, MSSA bacteremia 3/2023 and had negative ERENDIRA, PPM extracted and s/p micra    * f/u the blood and urine cx  * c/w ceftriaxone for now  * monitor CBC/diff   * rectal bleed management as per the primary team    The above assessment and plan was discussed with the primary team    Evelin Grove MD  contact on teams  After 5pm and on weekends call [phone]

## 2023-08-11 NOTE — ED PROVIDER NOTE - OBJECTIVE STATEMENT
Liam Garza is 79 y.o. M with PMHx Significant for HLD, A-fib, BPH, ESRD, CANDY, who presents to the ED with c.o. abd pain, rectal pain, and bloody stools.  Per patient, he has been experiencing abd pain and rectal bleeding onset 1 day ago.  Patient notes pain is localized to the suprapubic area, radiates to the RLQ.  He notes pain is intermittent and has had minimal improvement with Tylenol.  Nothing seems to exacerbate his pain.  His only other associated symptom is chills.  Otherwise he denies fevers, N/V, HA, changes in his vision, sore throat, SOB, CP, diarrhea or constipation. Blood per rectum is accumulated in his depends. Of note, patient notes rectal pain began in May which she describes as sharp and has been constant since onset.

## 2023-08-11 NOTE — ED ADULT NURSE REASSESSMENT NOTE - NS ED NURSE REASSESS COMMENT FT1
Knapp catheter inserted using sterile technique. Second RN present to confirm sterility.  Draining  to gravity. Secured with [employer].

## 2023-08-12 LAB
-  BLOOD PCR PANEL: SIGNIFICANT CHANGE UP
CULTURE RESULTS: SIGNIFICANT CHANGE UP
GRAM STN FLD: SIGNIFICANT CHANGE UP
HAV IGM SER-ACNC: SIGNIFICANT CHANGE UP
HBV CORE AB SER-ACNC: SIGNIFICANT CHANGE UP
HBV CORE IGM SER-ACNC: SIGNIFICANT CHANGE UP
HBV SURFACE AB SER-ACNC: <3 MIU/ML — LOW
HCT VFR BLD CALC: 38.6 % — LOW (ref 39–50)
HGB BLD-MCNC: 11.7 G/DL — LOW (ref 13–17)
MCHC RBC-ENTMCNC: 28.2 PG — SIGNIFICANT CHANGE UP (ref 27–34)
MCHC RBC-ENTMCNC: 30.3 GM/DL — LOW (ref 32–36)
MCV RBC AUTO: 93 FL — SIGNIFICANT CHANGE UP (ref 80–100)
METHOD TYPE: SIGNIFICANT CHANGE UP
NRBC # BLD: 0 /100 WBCS — SIGNIFICANT CHANGE UP (ref 0–0)
ORGANISM # SPEC MICROSCOPIC CNT: SIGNIFICANT CHANGE UP
ORGANISM # SPEC MICROSCOPIC CNT: SIGNIFICANT CHANGE UP
PLATELET # BLD AUTO: 137 K/UL — LOW (ref 150–400)
RBC # BLD: 4.15 M/UL — LOW (ref 4.2–5.8)
RBC # FLD: 15.4 % — HIGH (ref 10.3–14.5)
SPECIMEN SOURCE: SIGNIFICANT CHANGE UP
SPECIMEN SOURCE: SIGNIFICANT CHANGE UP
WBC # BLD: 7.18 K/UL — SIGNIFICANT CHANGE UP (ref 3.8–10.5)
WBC # FLD AUTO: 7.18 K/UL — SIGNIFICANT CHANGE UP (ref 3.8–10.5)

## 2023-08-12 PROCEDURE — 99232 SBSQ HOSP IP/OBS MODERATE 35: CPT | Mod: GC

## 2023-08-12 PROCEDURE — 99223 1ST HOSP IP/OBS HIGH 75: CPT | Mod: GC

## 2023-08-12 RX ORDER — PIPERACILLIN AND TAZOBACTAM 4; .5 G/20ML; G/20ML
3.38 INJECTION, POWDER, LYOPHILIZED, FOR SOLUTION INTRAVENOUS ONCE
Refills: 0 | Status: COMPLETED | OUTPATIENT
Start: 2023-08-12 | End: 2023-08-12

## 2023-08-12 RX ORDER — MESALAMINE 400 MG
1000 TABLET, DELAYED RELEASE (ENTERIC COATED) ORAL AT BEDTIME
Refills: 0 | Status: DISCONTINUED | OUTPATIENT
Start: 2023-08-12 | End: 2023-08-25

## 2023-08-12 RX ORDER — ACETAMINOPHEN 500 MG
650 TABLET ORAL ONCE
Refills: 0 | Status: COMPLETED | OUTPATIENT
Start: 2023-08-12 | End: 2023-08-12

## 2023-08-12 RX ORDER — PIPERACILLIN AND TAZOBACTAM 4; .5 G/20ML; G/20ML
3.38 INJECTION, POWDER, LYOPHILIZED, FOR SOLUTION INTRAVENOUS EVERY 8 HOURS
Refills: 0 | Status: DISCONTINUED | OUTPATIENT
Start: 2023-08-12 | End: 2023-08-14

## 2023-08-12 RX ORDER — SENNA PLUS 8.6 MG/1
2 TABLET ORAL AT BEDTIME
Refills: 0 | Status: DISCONTINUED | OUTPATIENT
Start: 2023-08-12 | End: 2023-08-25

## 2023-08-12 RX ADMIN — PIPERACILLIN AND TAZOBACTAM 25 GRAM(S): 4; .5 INJECTION, POWDER, LYOPHILIZED, FOR SOLUTION INTRAVENOUS at 21:23

## 2023-08-12 RX ADMIN — Medication 1000 MILLIGRAM(S): at 21:32

## 2023-08-12 RX ADMIN — Medication 100 MILLIGRAM(S): at 06:29

## 2023-08-12 RX ADMIN — Medication 1 SUPPOSITORY(S): at 06:30

## 2023-08-12 RX ADMIN — PIPERACILLIN AND TAZOBACTAM 200 GRAM(S): 4; .5 INJECTION, POWDER, LYOPHILIZED, FOR SOLUTION INTRAVENOUS at 09:36

## 2023-08-12 RX ADMIN — Medication 137 MICROGRAM(S): at 06:30

## 2023-08-12 RX ADMIN — MIDODRINE HYDROCHLORIDE 10 MILLIGRAM(S): 2.5 TABLET ORAL at 06:29

## 2023-08-12 RX ADMIN — Medication 650 MILLIGRAM(S): at 01:02

## 2023-08-12 RX ADMIN — SIMVASTATIN 40 MILLIGRAM(S): 20 TABLET, FILM COATED ORAL at 01:03

## 2023-08-12 RX ADMIN — POLYETHYLENE GLYCOL 3350 17 GRAM(S): 17 POWDER, FOR SOLUTION ORAL at 06:29

## 2023-08-12 RX ADMIN — MIDODRINE HYDROCHLORIDE 10 MILLIGRAM(S): 2.5 TABLET ORAL at 15:32

## 2023-08-12 RX ADMIN — MIDODRINE HYDROCHLORIDE 10 MILLIGRAM(S): 2.5 TABLET ORAL at 19:57

## 2023-08-12 RX ADMIN — SIMVASTATIN 40 MILLIGRAM(S): 20 TABLET, FILM COATED ORAL at 21:23

## 2023-08-12 RX ADMIN — PANTOPRAZOLE SODIUM 40 MILLIGRAM(S): 20 TABLET, DELAYED RELEASE ORAL at 06:29

## 2023-08-12 RX ADMIN — ESCITALOPRAM OXALATE 5 MILLIGRAM(S): 10 TABLET, FILM COATED ORAL at 15:31

## 2023-08-12 RX ADMIN — PIPERACILLIN AND TAZOBACTAM 25 GRAM(S): 4; .5 INJECTION, POWDER, LYOPHILIZED, FOR SOLUTION INTRAVENOUS at 13:00

## 2023-08-12 RX ADMIN — Medication 650 MILLIGRAM(S): at 02:02

## 2023-08-12 RX ADMIN — Medication 650 MILLIGRAM(S): at 17:11

## 2023-08-12 RX ADMIN — SENNA PLUS 2 TABLET(S): 8.6 TABLET ORAL at 21:22

## 2023-08-12 NOTE — PROGRESS NOTE ADULT - ASSESSMENT
78  year old male    h/o  CAD s/p stent , asa.     A-fib/ PPM, , hypothyroid, and total left knee replacement    prior ppm  interrogation  with  WCT   Ct chest, retrosternal hematoma.  mod left pl effusion/ has   c/c leg  pain/  h/o  non complaince  with meds       admitted  with  abd pain and rectal bleeding onset 1 day ago.. arrive s form n home     has  no pain  now    c/c   systolic  and  diastolic  chf,,  cardiomyopathy,. Mitral  valvuloplasty ,  h/o  c/c  l/edema  of L  leg    h/o    c/c AFIB,   has  PPM         eliquis   on  hold  now      h/o c/c   Anemia, , hb is  12.  gi   known to  dr joann INFANTE,     cath, with 2 vessel disease,  s/p  CABG and  MVR  surg d r marni      echo,  on 7/2022,  ef  35/ new/ severe  TR,  and ,  cath,   was non  obstructive     s/p   MSSA  bacterinia , in march 2023          was  on iv ancef..  s/p   explant pf  ppm  and  Micra  placement on  3/30/23. dr sunitha rocha  ?  cauti,  on iv rocephin/  follow   cx .  ha s  c/c  delgadillo    Echo,  3/2023,  ef  25,  mod  AI. severe  TR . RV hypokinesis    BEULAH, from  [facility], dx  recently , ancef / was  switched   to iv vanco,   CKD, on HD,  has  right Permcath.  hpuse renal   s/p  renal bx on 4/ 14.. 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 + , Ig A  Lamda. ,  known to heme  dr dobson.  pe r heme,  no  need  for  b marrow  bx    cxr/  CT chest,  pl  effusion,  will need  HD  gi    f/p,  no  bleeding  now/  nuclear  scan, no  bleeding   bactremia,  Clostridium cadeveris,  defer rx  to  ID/  ON IV  ROCEPHIN       discussed  with  team                    card /  dr mikayla dick       < from: Intra-Operative Transesophageal Echo W or WO Ultrasound Enhancing Agent (03.30.23 @ 12:48) >  --------------------------------------------------------------------------------  PRE-BYPASS FINDINGS  Left Ventricle:  Left ventricular ejection fraction is estimated at 25 to 30%. There is severe kypokinesis in the left ventricle.  Right Ventricle:  Moderately reduced systolic function. Moderately reduced systolic function. There is moderate hypokinesis in the right ventricle.  Left Atrium:  Diffuse smoke visualized in LA.  Aortic Valve:  The aortic valve appears trileaflet. There is moderate aortic regurgitation.  No obvious vegetations visualized  Mitral Valve:  Bioprosthetic valve is present in the mitral position. There is calcification of the mitral valve annulus.  No obvious vegetations visualized.  Tricuspid Valve:  There is moderate-severe tricuspid regurgitation. TR unchanged following lead extraction.  Pulmonic Valve:  There is mild pulmonic regurgitation.  Pericardium:  No pericardial effusion seen. No pericardial effusion visualized before and after lead extraction.  Electronically signed by Jimmy Gambino on 3/30/2023 at 3:00:58 PM  *** Final ***  < end of copied text >                                  78  year old male    h/o  CAD s/p stent , asa.     A-fib/ PPM, , hypothyroid, and total left knee replacement    prior ppm  interrogation  with  WCT   Ct chest, retrosternal hematoma.  mod left pl effusion/ has   c/c leg  pain/  h/o  non complaince  with meds       admitted  with  abd pain and rectal bleeding onset 1 day ago.. arrive s form n home     has  no pain  now    c/c   systolic  and  diastolic  chf,,  cardiomyopathy,. Mitral  valvuloplasty ,  h/o  c/c  l/edema  of L  leg    h/o    c/c AFIB,   has  PPM         eliquis   on  hold  now      h/o c/c   Anemia, , hb is  12.  gi   known to  dr joann INFANTE,     cath, with 2 vessel disease,  s/p  CABG and  MVR  surg d r marni      echo,  on 7/2022,  ef  35/ new/ severe  TR,  and ,  cath,   was non  obstructive     s/p   MSSA  bacterinia , in march 2023          was  on iv ancef..  s/p   explant pf  ppm  and  Micra  placement on  3/30/23. dr sunitha rocha  ?  cauti,  on iv rocephin/  follow   cx .  ha s  c/c  delgadillo    Echo,  3/2023,  ef  25,  mod  AI. severe  TR . RV hypokinesis    BEULAH, from  AIN, dx  recently , ancef / was  switched   to iv vanco,   CKD, on HD,  has  right Permcath.  hpuse renal   s/p  renal bx on 4/ 14.. 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 + , Ig A  Lamda. ,  known to heme  dr dobson.  pe r heme,  no  need  for  b marrow  bx    cxr/  CT chest,  pl  effusion,  will need  HD  gi    f/p,  no  bleeding  now/  nuclear  scan, no  bleeding   bacteremia   Clostridium cadeveris,  defer rx  to  ID/ on iv zosun now, pe r ID    echo       discussed  with  team                    card /  dr mikayla dick       < from: Intra-Operative Transesophageal Echo W or WO Ultrasound Enhancing Agent (03.30.23 @ 12:48) >  --------------------------------------------------------------------------------  PRE-BYPASS FINDINGS  Left Ventricle:  Left ventricular ejection fraction is estimated at 25 to 30%. There is severe kypokinesis in the left ventricle.  Right Ventricle:  Moderately reduced systolic function. Moderately reduced systolic function. There is moderate hypokinesis in the right ventricle.  Left Atrium:  Diffuse smoke visualized in LA.  Aortic Valve:  The aortic valve appears trileaflet. There is moderate aortic regurgitation.  No obvious vegetations visualized  Mitral Valve:  Bioprosthetic valve is present in the mitral position. There is calcification of the mitral valve annulus.  No obvious vegetations visualized.  Tricuspid Valve:  There is moderate-severe tricuspid regurgitation. TR unchanged following lead extraction.  Pulmonic Valve:  There is mild pulmonic regurgitation.  Pericardium:  No pericardial effusion seen. No pericardial effusion visualized before and after lead extraction.  Electronically signed by Jimmy Gambino on 3/30/2023 at 3:00:58 PM  *** Final ***  < end of copied text >

## 2023-08-12 NOTE — PROGRESS NOTE ADULT - ATTENDING COMMENTS
Patient some some bm's after suppository, unclear if disimpaction has been done. Some bleeding still as well. Abd soft, non tender.  Drinking clears on exam, appears well. Labs, vitals, imaging reviewed.    - needs disimpaction  - no need for IR at this time, likely bleeding from proctitis.    - hold eliquis for now  - cont bowel regimen  - monitor H/H  - care per primary team    Briseyda Azar MD

## 2023-08-12 NOTE — PROGRESS NOTE ADULT - ASSESSMENT
79M with PMHx Significant for HLD, A-fib on xarelto, BPH, ESRD, CANDY, who presents to the ED with c.o. abd pain, rectal pain, and bloody stools. Known by colorectal surgery for hemorrhoidal rectal bleeding. CT w/ findings of abundant fecal material in rectum and proctocolitis which is likely the source of bleeding, not his hemorrhoids.    RECOMMENDATIONS:  - No acute surgical intervention  - Medicine admission  - Aggressive bowel regimen: fleet enema, miralax, golytely  - Fecal disimpaction for stool ball in rectum  - No IR intervention needed at this time - hemorrhoidal bleeding is unlikely at this time  - GI eval - possible scope once fecal burden improves  - Colorectal surgery will follow    [facility]  [phone]

## 2023-08-12 NOTE — PROGRESS NOTE ADULT - ASSESSMENT
79 M PMH AF, CAD, MS s/p CABG and MVR, BPH, ESRD on HD via R PermCath, recent MSSA bacteremia 3/2023 s/p PPM extraction and Micra placement, now p/w rectal bleed and loss of appetite.     Afebrile, no leukocytosis   UA +  CT: Abundant fecal material within the rectum with mild wall thickening and trace surrounding stranding. Recommend clinical correlation to assess for stercoral colitis. No bowel obstruction. Cholelithiasis. Findings of pulmonary edema with moderate right and small left pleural effusion.    Stercoral colitis  Dysuria and + UA c/f UTI   Now with Clostridium cadaveris bacteremia, suspect translocation from gut     Suggest;  Repeat 2 sets of blood cx in AM  Continue Zosyn  Check TTE out of over abundance of caution   F/u urine cx

## 2023-08-12 NOTE — PROGRESS NOTE ADULT - SUBJECTIVE AND OBJECTIVE BOX
Follow Up: Bacteremia      Interval History/ROS: Seen at bedside     REVIEW OF SYSTEMS  Abd pain improved  No fevers     Allergies  Myrbetriq (Hives)  tamsulosin (Hives)  alfuzosin (Hives)  levofloxacin (Hives)    ANTIMICROBIALS:    piperacillin/tazobactam IVPB.- 3.375 once  piperacillin/tazobactam IVPB.. 3.375 every 8 hours    OTHER MEDS: MEDICATIONS  (STANDING):  escitalopram 5 daily  levothyroxine 137 daily  metoprolol succinate  daily  midodrine. 10 three times a day  midodrine. 10 <User Schedule> PRN  pantoprazole    Tablet 40 before breakfast  polyethylene glycol 3350 17 two times a day  simvastatin 40 at bedtime    Vital Signs Last 24 Hrs  T(F): 98 (08-12-23 @ 05:30), Max: 98.6 (08-11-23 @ 17:58)    Vital Signs Last 24 Hrs  HR: 68 (08-12-23 @ 05:30) (68 - 76)  BP: 109/59 (08-12-23 @ 05:30) (102/55 - 122/61)  RR: 18 (08-12-23 @ 05:30)  SpO2: 95% (08-12-23 @ 05:30) (94% - 100%)  Wt(kg): --    EXAM:  General: NAD, non toxic  Head: atraumatic, normocephalic  Eyes: L eye smaller with dilated pupil  ENT: no oropharyngeal lesions, no LAD, neck supple  Cardio: S1,S2  Respiratory: clear b/l, no wheezing  Abd: soft, BS +, not tender  : no CVAT, no suprapubic tenderness  Musculoskeletal : no joint swelling, no edema  Skin: petechial rash on torose and UE  Vascular: R permacath  Neurologic: no focal deficits  Psych: normal affect    Labs:                        11.5   6.63  )-----------( 142      ( 11 Aug 2023 21:12 )             36.9     08-11    139  |  99  |  42<H>  ----------------------------<  81  3.5   |  25  |  2.36<H>    Ca    10.3      11 Aug 2023 21:12  Mg     1.7     08-11    TPro  7.0  /  Alb  3.4  /  TBili  0.5  /  DBili  x   /  AST  19  /  ALT  12  /  AlkPhos  137<H>  08-11    WBC Trend:  WBC Count: 6.63 (08-11-23 @ 21:12)  WBC Count: 7.14 (08-11-23 @ 01:23)    Creatine Trend:  Creatinine: 2.36 (08-11)  Creatinine: 2.33 (08-11)    Liver Biochemical Testing Trend:  Alanine Aminotransferase (ALT/SGPT): 12 (08-11)  Alanine Aminotransferase (ALT/SGPT): <5 *L* (04-06)  Alanine Aminotransferase (ALT/SGPT): <5 *L* (04-05)  Alanine Aminotransferase (ALT/SGPT): <5 *L* (04-05)  Alanine Aminotransferase (ALT/SGPT): 19 (03-25)  Aspartate Aminotransferase (AST/SGOT): 19 (08-11-23 @ 01:23)  Aspartate Aminotransferase (AST/SGOT): 10 (04-06-23 @ 07:06)  Aspartate Aminotransferase (AST/SGOT): 13 (04-05-23 @ 22:41)  Aspartate Aminotransferase (AST/SGOT): 14 (04-05-23 @ 07:08)  Aspartate Aminotransferase (AST/SGOT): 26 (03-25-23 @ 12:16)  Bilirubin Total: 0.5 (08-11)  Bilirubin Total, Serum: 0.4 (04-06)  Bilirubin Total, Serum: 0.4 (04-05)  Bilirubin Total, Serum: 0.5 (04-05)  Bilirubin Total, Serum: 1.0 (03-25)    Urinalysis Basic - ( 11 Aug 2023 21:12 )    Color: x / Appearance: x / SG: x / pH: x  Gluc: 81 mg/dL / Ketone: x  / Bili: x / Urobili: x   Blood: x / Protein: x / Nitrite: x   Leuk Esterase: x / RBC: x / WBC x   Sq Epi: x / Non Sq Epi: x / Bacteria: x    MICROBIOLOGY:    MRSA PCR Result.: Citlali (04-04-23 @ 13:29)    Culture - Blood (collected 11 Aug 2023 00:55)  Source: .Blood Blood-Venous  Preliminary Report:    Growth in anaerobic bottle: Clostridium cadaveris    Direct identification is available within approximately 3-5    hours either by Blood Panel Multiplexed PCR or Direct    MALDI-TOF. Details: [email or web address]  Organism: Blood Culture PCR  Organism: Blood Culture PCR    Sensitivities:      Method Type: PCR      -  Blood PCR Panel: NEG    Culture - Blood (collected 11 Aug 2023 00:45)  Source: .Blood Blood-Venous  Preliminary Report:    No growth at 24 hours    Culture - Acid Fast - Urine (collected 27 Apr 2023 19:16)  Source: .Urine Urine  Final Report:    No acid-fast bacilli isolated after 6 weeks.    Culture - Urine (collected 15 Apr 2023 06:46)  Source: Clean Catch Clean Catch (Midstream)  Final Report:    No growth    Culture - Urine (collected 04 Apr 2023 23:13)  Source: Clean Catch Clean Catch (Midstream)  Final Report:    No growth    Culture - Surgical Swab (collected 30 Mar 2023 22:15)  Source: .Surgical Swab lead tips  Final Report:    No growth at 5 days    Culture - Fungal, Other (collected 30 Mar 2023 22:15)  Source: .Other Other  Final Report:    No fungus isolated at 4 weeks.    Culture - Blood (collected 28 Mar 2023 07:03)  Source: .Blood Blood-Peripheral  Final Report:    No Growth Final    Culture - Blood (collected 28 Mar 2023 07:03)  Source: .Blood Blood-Peripheral  Final Report:    No Growth Final    Culture - Blood (collected 25 Mar 2023 11:55)  Source: .Blood  Final Report:    Growth in anaerobic bottle: Staphylococcus aureus    ***Blood Panel PCR results on this specimen are available    approximately 3 hours after the Gram stain result.***    Gram stain, PCR, and/or culture results may not always    correspond due to difference in methodologies.    ************************************************************    This PCR assay was performed by multiplex PCR. This    Assay tests for 66 bacterial and resistance gene targets.    Please refer to the [facility] Labs test directory    at [email or web address] for details.  Organism: Blood Culture PCR  Staphylococcus aureus  Organism: Staphylococcus aureus    Sensitivities:      Method Type: LEWIS      -  Ampicillin/Sulbactam: S <=8/4      -  Cefazolin: S <=4      -  Clindamycin: R 0.5 This isolate is presumed to be clindamycin resistant based on detection of inducible resistance. Clindamycin may still be effective in some patients.      -  Erythromycin: R >4      -  Gentamicin: S <=1 Should not be used as monotherapy      -  Oxacillin: S <=0.25 Oxacillin predicts susceptibility for dicloxacillin, methicillin, and nafcillin      -  Penicillin: R >8      -  Rifampin: S <=1 Should not be used as monotherapy      -  Tetracycline: S <=1      -  Trimethoprim/Sulfamethoxazole: S <=0.5/9.5      -  Vancomycin: S 2  Organism: Blood Culture PCR    Sensitivities:      Method Type: PCR      -  Methicillin SENSITIVE Staphylococcus aureus (MSSA): Detec Any isolate of Staphylococcus aureus from a blood culture is NOT considered a contaminant.    HIV-1/2 Combo Result: Nonreact (04-06-23 @ 11:32)    Rapid RVP Result: NotDetec (08-11 @ 01:27)    Troponin T, High Sensitivity Result: 58 (08-11)    Blood Gas Venous - Lactate: 1.1 (08-11 @ 00:57)    RADIOLOGY:  imaging below personally reviewed    < from: NM GI Bleed Localization (NM GI Bleed Localization .) (08.11.23 @ 17:40) >  IMPRESSION:    No evidence of active bleeding site.     These results were discussed with DASIA Parks NP, by Dr. BARBRA Natarajan via   telephone with read back at about 6:00 PM on 8/11/2023.    < end of copied text >    < from: CT Abdomen and Pelvis w/ IV Cont (08.11.23 @ 04:32) >  IMPRESSION:    Abundant fecal material within the rectum with mild wall thickening and   trace surrounding stranding. Recommend clinical correlation to assess for   stercoral colitis. No bowel obstruction.    Cholelithiasis.    Findings of pulmonary edema with moderate right and small left pleural   effusion.    Additional findings mentioned above.    < end of copied text >

## 2023-08-12 NOTE — PROGRESS NOTE ADULT - SUBJECTIVE AND OBJECTIVE BOX
date of service: 08-12-23 @ 09:02  afberile,  less  sob  REVIEW OF SYSTEMS:  CONSTITUTIONAL: No fever,  no  weight loss  ENT:  No  tinnitus,   no   vertigo  NECK: No pain or stiffness  RESPIRATORY: No cough, wheezing, chills or hemoptysis;    No Shortness of Breath  CARDIOVASCULAR: No chest pain, palpitations, dizziness  GASTROINTESTINAL: No abdominal or epigastric pain. No nausea, vomiting, or hematemesis; No diarrhea  No melena or hematochezia.  GENITOURINARY: No dysuria, frequency, hematuria, or incontinence  NEUROLOGICAL: No headaches  SKIN: No itching,  no   rash  LYMPH Nodes: No enlarged glands  ENDOCRINE: No heat or cold intolerance  MUSCULOSKELETAL: No joint pain or swelling  PSYCHIATRIC: No depression, anxiety  HEME/LYMPH: No easy bruising, or bleeding gums  ALLERGY AND IMMUNOLOGIC: No hives or eczema	    MEDICATIONS  (STANDING):  cefTRIAXone   IVPB 1000 milliGRAM(s) IV Intermittent every 24 hours  escitalopram 5 milliGRAM(s) Oral daily  hydrocortisone hemorrhoidal Suppository 1 Suppository(s) Rectal two times a day  levothyroxine 137 MICROGram(s) Oral daily  metoprolol succinate  milliGRAM(s) Oral daily  midodrine. 10 milliGRAM(s) Oral three times a day  pantoprazole    Tablet 40 milliGRAM(s) Oral before breakfast  polyethylene glycol 3350 17 Gram(s) Oral two times a day  simvastatin 40 milliGRAM(s) Oral at bedtime    MEDICATIONS  (PRN):  midodrine. 10 milliGRAM(s) Oral <User Schedule> PRN PRIOR TO HD  sodium chloride 0.9% Bolus. 100 milliLiter(s) IV Bolus every 5 minutes PRN SBP LESS THAN or EQUAL to 90 mmHg      Vital Signs Last 24 Hrs  T(C): 36.7 (12 Aug 2023 05:30), Max: 37 (11 Aug 2023 17:58)  T(F): 98 (12 Aug 2023 05:30), Max: 98.6 (11 Aug 2023 17:58)  HR: 68 (12 Aug 2023 05:30) (68 - 76)  BP: 109/59 (12 Aug 2023 05:30) (102/55 - 122/61)  BP(mean): --  RR: 18 (12 Aug 2023 05:30) (17 - 20)  SpO2: 95% (12 Aug 2023 05:30) (94% - 100%)    Parameters below as of 12 Aug 2023 05:30  Patient On (Oxygen Delivery Method): nasal cannula  O2 Flow (L/min): 3    CAPILLARY BLOOD GLUCOSE        I&O's Summary    11 Aug 2023 07:01  -  12 Aug 2023 07:00  --------------------------------------------------------  IN: 800 mL / OUT: 2000 mL / NET: -1200 mL          Appearance: Normal	  HEENT:   Normal oral mucosa, PERRL, EOMI	  Lymphatic: No lymphadenopathy  Cardiovascular: Normal S1 S2, No JVD  Respiratory: Lungs clear to auscultation	  Gastrointestinal:  Soft, Non-tender, + BS	  Skin: No rash, No ecchymoses	  Extremities:     LABS:                        11.5   6.63  )-----------( 142      ( 11 Aug 2023 21:12 )             36.9     08-11    139  |  99  |  42<H>  ----------------------------<  81  3.5   |  25  |  2.36<H>    Ca    10.3      11 Aug 2023 21:12  Mg     1.7     08-11    TPro  7.0  /  Alb  3.4  /  TBili  0.5  /  DBili  x   /  AST  19  /  ALT  12  /  AlkPhos  137<H>  08-11    PT/INR - ( 11 Aug 2023 21:11 )   PT: 19.5 sec;   INR: 1.80 ratio         PTT - ( 11 Aug 2023 01:23 )  PTT:36.4 sec      Urinalysis Basic - ( 11 Aug 2023 21:12 )    Color: x / Appearance: x / SG: x / pH: x  Gluc: 81 mg/dL / Ketone: x  / Bili: x / Urobili: x   Blood: x / Protein: x / Nitrite: x   Leuk Esterase: x / RBC: x / WBC x   Sq Epi: x / Non Sq Epi: x / Bacteria: x          08-11 @ 00:57  3.5  44              Consultant(s) Notes Reviewed:      Care Discussed with Consultants/Other Providers:

## 2023-08-12 NOTE — PROGRESS NOTE ADULT - SUBJECTIVE AND OBJECTIVE BOX
[facility]  Thanh Michaels PA-C  [address] [place] [id number] [phone]    INTERVAL HPI/OVERNIGHT EVENTS:  pt s/e, denies having abd. pain   tolerating diet no n/v   +Bm non bloody    MEDICATIONS  (STANDING):  escitalopram 5 milliGRAM(s) Oral daily  hydrocortisone hemorrhoidal Suppository 1 Suppository(s) Rectal two times a day  levothyroxine 137 MICROGram(s) Oral daily  mesalamine Suppository 1000 milliGRAM(s) Rectal at bedtime  metoprolol succinate  milliGRAM(s) Oral daily  PAST MEDICAL & SURGICAL HISTORY:  Afib  not on anticoagulation      CAD (coronary artery disease)      Varicose vein of leg      Hypothyroid      Pacemaker      H/O fracture of hip        H/O asbestosis      HTN (hypertension)      Pacemaker  [employer]tronic - Model RVDR01 1/10/2012      Stented coronary artery  drug eluding stent 2007      H/O detached retina repair        S/P cataract surgery      History of hip surgery  left hip ORIF      H/O varicose vein stripping   left leg      History of left knee replacement   - multiple infections post op requiring reoperation in , , )      H/O foot surgery  for infection of right foot 2019      H/O inguinal hernia repair  right       Family history:  No pertinent family history in first degree relatives    FH: skin cancer (Father)        Social History:   no etoh no cigs   no ivda    ROS:     General:  No wt loss, fevers, chills, night sweats, fatigue,   Eyes:  Good vision, no reported pain  ENT:  No sore throat, pain, runny nose, dysphagia  CV:  No pain, palpitations, hypo/hypertension  Resp:  No dyspnea, cough, tachypnea, wheezing  GI:  No pain, No nausea, No vomiting, No diarrhea, No constipation, No weight loss, No fever, No pruritis, No rectal bleeding, No tarry stools, No dysphagia,  :  No pain, bleeding, incontinence, nocturia  Muscle:  No pain, weakness  Neuro:  No weakness, tingling, memory problems  Psych:  No fatigue, insomnia, mood problems, depression  Endocrine:  No polyuria, polydipsia, cold/heat intolerance  Heme:  No petechiae, ecchymosis, easy bruisability  Skin:  No rash, tattoos, scars, edema      PHYSICAL EXAM:   Vital Signs Last 24 Hrs  T(C): 36.5 (12 Aug 2023 12:33), Max: 37 (11 Aug 2023 17:58)  T(F): 97.7 (12 Aug 2023 12:33), Max: 98.6 (11 Aug 2023 17:58)  HR: 63 (12 Aug 2023 12:) (63 - 76)  BP: 104/63 (12 Aug 2023 12:33) (102/55 - 122/61)  BP(mean): --  RR: 18 (12 Aug 2023 12:) (18 - 20)  SpO2: 96% (12 Aug 2023 12:) (94% - 100%)    Parameters below as of 12 Aug 2023 12:33  Patient On (Oxygen Delivery Method): nasal cannula  O2 Flow (L/min): 3        Daily Height in cm: 170.18 (11 Aug 2023 00:36)    Daily     GENERAL:  Appears stated age, well-groomed, well-nourished, no distress  HEENT:  NC/AT,  conjunctivae clear and pink, no thyromegaly, nodules, adenopathy, no JVD, sclera -anicteric  CHEST:  Full & symmetric excursion, no increased effort, breath sounds clear  HEART:  Regular rhythm, S1, S2, no murmur/rub/S3/S4, no abdominal bruit, no edema  ABDOMEN:  Soft, non-tender, non-distended, normoactive bowel sounds,  no masses ,no hepato-splenomegaly, no signs of chronic liver disease  EXTEREMITIES:  no cyanosis,clubbing or edema  SKIN:  No rash/erythema/ecchymoses/petechiae/wounds/abscess/warm/dry  NEURO:  Alert, oriented, no asterixis, no tremor, no encephalopathy    LABS:                                 11.7   7.18  )-----------( 137      ( 12 Aug 2023 11:24 )             38.6   08    139  |  99  |  42<H>  ----------------------------<  81  3.5   |  25  |  2.36<H>    Ca    10.3      11 Aug 2023 21:12  Mg     1.7         TPro  7.0  /  Alb  3.4  /  TBili  0.5  /  DBili  x   /  AST  19  /  ALT  12  /  AlkPhos  137<H>        8-11    LIVER FUNCTIONS - ( 11 Aug 2023 01:23 )  Alb: 3.4 g/dL / Pro: 7.0 g/dL / ALK PHOS: 137 U/L / ALT: 12 U/L / AST: 19 U/L / GGT: x           PT/INR - ( 11 Aug 2023 01:23 )   PT: 23.5 sec;   INR: 2.29 ratio         PTT - ( 11 Aug 2023 01:23 )  PTT:36.4 sec  Urinalysis Basic - ( 11 Aug 2023 06:13 )    Color: Dark Yellow / Appearance: Turbid / S.017 / pH: x  Gluc: x / Ketone: Negative  / Bili: Small / Urobili: 3 mg/dL   Blood: x / Protein: 100 mg/dL / Nitrite: Positive   Leuk Esterase: Large / RBC: 463 /hpf /  /HPF   Sq Epi: x / Non Sq Epi: x / Bacteria: Many      Amylase Serum--      Lipase serum12       Ammonia--      Imaging:    < from: NM GI Bleed Localization (NM GI Bleed Localization .) (23 @ 17:40) >  ACC: [id number] EXAM:  NM GI BLEEDING IMG   ORDERED BY: JAYLENE PARKS     PROCEDURE DATE:  2023          INTERPRETATION:  RADIOPHARMACEUTICAL:  20.0 mCi Tc-99m labeled autologous   red blood cells, I.V.    CLINICAL INFORMATION: GI Bleeding referred for localization of bleeding   site.    TECHNIQUE: Dynamic imaging of the abdomen and pelvis was performed for 2   hours following administration of radiolabeled autologous red blood   cells. Static images of the abdomen and pelvis in the anterior and   lateral views were obtained immediately thereafter. An Anterior view of   the head/neck was obtained for  purposes.    COMPARISON: None.    FINDINGS: There is physiologic distribution of radiolabeled red cells in   the abdomen and pelvis. There is no abnormal focus of increased   radiotracer activity to suggest the presence of an active bleeding site.    IMPRESSION:    No evidence of active bleeding site.     These results were discussed with DASIA Parks NP, by Dr. BARBRA Natarajan via   telephone with read back at about 6:00 PM on 2023.    --- End of Report ---            HAM NATARAJAN MD; Attending Nuclear Medicine  This document has been electronically signed. Aug 11 2023  6:01PM    < end of copied text >

## 2023-08-12 NOTE — PROGRESS NOTE ADULT - ATTENDING COMMENTS
79-yo M w/ PMH of CAD, s/p CABG and MVR, BPH, ESRD on HD, and recent MSSA bacteremia (3/2023) s/p PPM extraction and Micra placement, adm w/ GIB. Constipated. CT finding w/c/f/ stercoral colitis. BCx w/ Clostridium cadaveris, likely from gut translocation a/w stercoral colitis.    #Clostridium cadaveris bacteremia  #Stercoral colitis  #Constipation  #UTI  #Elevated serum creatinine  - Would repeat 2 sets of BCx  - Continue with Zosyn.  - Consider TTE    Otherwise as described in fellow's note.    Plan discussed with primary team and ID fellow.  Thank you for this consult. Inpatient ID team will follow.    Abhay Walters MD, PhD  Attending Physician  Division of Infectious Diseases  Department of Medicine    Please contact through MS Teams message.  Office: [phone] (after 5 PM or weekend)

## 2023-08-12 NOTE — PROGRESS NOTE ADULT - SUBJECTIVE AND OBJECTIVE BOX
[facility] Daily Resident Progress Note    OVERNIGHT:  No acute events.     SUBJECTIVE: Pt seen and examined at bedside.     OBJECTIVE:  Vital Signs Last 24 Hrs  T(C): 36.1 (11 Aug 2023 23:58), Max: 37 (11 Aug 2023 17:58)  T(F): 97 (11 Aug 2023 23:58), Max: 98.6 (11 Aug 2023 17:58)  HR: 73 (11 Aug 2023 23:58) (61 - 77)  BP: 102/55 (11 Aug 2023 23:58) (102/55 - 132/74)  BP(mean): --  RR: 18 (11 Aug 2023 23:58) (14 - 20)  SpO2: 95% (11 Aug 2023 23:58) (94% - 100%)    Parameters below as of 11 Aug 2023 23:58  Patient On (Oxygen Delivery Method): nasal cannula  O2 Flow (L/min): 3    Physical Exam:  General: NAD, Lying in bed comfortably  Neuro: Alert and answering questions appropriately   HEENT: Grossly normal, EOMI  Cardio: No peripherial edema  Resp: Good effort, no signs of respiratory distress  Abd: Soft, NT/ND, no rebound/guarding, no masses palpated      Medications:  MEDICATIONS  (STANDING):  cefTRIAXone   IVPB 1000 milliGRAM(s) IV Intermittent every 24 hours  escitalopram 5 milliGRAM(s) Oral daily  hydrocortisone hemorrhoidal Suppository 1 Suppository(s) Rectal two times a day  levothyroxine 137 MICROGram(s) Oral daily  metoprolol succinate  milliGRAM(s) Oral daily  midodrine. 10 milliGRAM(s) Oral three times a day  pantoprazole    Tablet 40 milliGRAM(s) Oral before breakfast  polyethylene glycol 3350 17 Gram(s) Oral two times a day  simvastatin 40 milliGRAM(s) Oral at bedtime    MEDICATIONS  (PRN):  midodrine. 10 milliGRAM(s) Oral <User Schedule> PRN PRIOR TO HD  sodium chloride 0.9% Bolus. 100 milliLiter(s) IV Bolus every 5 minutes PRN SBP LESS THAN or EQUAL to 90 mmHg    Allergies:  Myrbetriq (Hives)  tamsulosin (Hives)  alfuzosin (Hives)  levofloxacin (Hives)      Labs:  08-11    139  |  99  |  42<H>  ----------------------------<  81  3.5   |  25  |  2.36<H>    Ca    10.3      11 Aug 2023 21:12  Mg     1.7     08-11    TPro  7.0  /  Alb  3.4  /  TBili  0.5  /  DBili  x   /  AST  19  /  ALT  12  /  AlkPhos  137<H>  08-11                          11.5   6.63  )-----------( 142      ( 11 Aug 2023 21:12 )             36.9     PT/INR - ( 11 Aug 2023 21:11 )   PT: 19.5 sec;   INR: 1.80 ratio         PTT - ( 11 Aug 2023 01:23 )  PTT:36.4 sec

## 2023-08-12 NOTE — PROGRESS NOTE ADULT - ASSESSMENT
a/p colitis  constipation    plan  in and out  ppi once a day  bowel regimen  gerd precautions  start bowel regimen  add lactulose 10cc q 6 hours and glycerin suppository  gas pill with simethicone and maalox q 6 hours  monitor stool output   consider ct angio d/w dr saenz maybe a lowere maisha bleed  NM GI Bleeding scan noted, no evidence of active bleeding  surgery following  will follow       Advanced care planning was discussed with patient and family.  Advanced care planning forms were reviewed and discussed.  Risks, benefits and alternatives of gastroenterologic procedures were discussed in detail and all questions were answered.    30 minutes spent.

## 2023-08-12 NOTE — CHART NOTE - NSCHARTNOTEFT_GEN_A_CORE
BEULAH dialysis dependent followed by our team in May last admission.   Was started on HD then   Biopsy showed intersitial disease.   Was in rehab til now - only recently HD was changed to twice a week Monday and friday this past week at rehab.   Now with rectal bleed  CT showed worsening pleural effusion    Had HD yesterday on admission  PLan for UF today for further volume management.   2 hours 1.5 liters UF    Dw girlfriend and pt bedside    Maeve Washington MD  Off: [phone]  contact me on teams    (After 5 pm or on weekends please page the on-call fellow/attending, can check [email or web address] for schedule. Login is sunil aguirre, schedule under [facility] medicine, psych, derm)

## 2023-08-12 NOTE — PROGRESS NOTE ADULT - SUBJECTIVE AND OBJECTIVE BOX
Date of Service: 08-12-23 @ 08:33           CARDIOLOGY     PROGRESS  NOTE   ________________________________________________    CHIEF COMPLAINT:Patient is a 78y old  Male who presents with a chief complaint of abdominal pain/ rectal bleed (12 Aug 2023 02:47)  doing better  	  REVIEW OF SYSTEMS:  CONSTITUTIONAL: No fever, weight loss, or fatigue  EYES: No eye pain, visual disturbances, or discharge  ENT:  No difficulty hearing, tinnitus, vertigo; No sinus or throat pain  NECK: No pain or stiffness  RESPIRATORY: No cough, wheezing, chills or hemoptysis; No Shortness of Breath  CARDIOVASCULAR: No chest pain, palpitations, passing out, dizziness, or leg swelling  GASTROINTESTINAL: No abdominal or epigastric pain. No nausea, vomiting, or hematemesis; No diarrhea or constipation.  + mild rectal bleed  GENITOURINARY: No dysuria, frequency, hematuria, or incontinence  NEUROLOGICAL: No headaches, memory loss, loss of strength, numbness, or tremors  SKIN: No itching, burning, rashes, or lesions   LYMPH Nodes: No enlarged glands  ENDOCRINE: No heat or cold intolerance; No hair loss  MUSCULOSKELETAL: No joint pain or swelling; No muscle, back, or extremity pain  PSYCHIATRIC: No depression, anxiety, mood swings, or difficulty sleeping  HEME/LYMPH: No easy bruising, or bleeding gums  ALLERGY AND IMMUNOLOGIC: No hives or eczema	    [ x] All others negative	  [ ] Unable to obtain    PHYSICAL EXAM:  T(C): 36.7 (08-12-23 @ 05:30), Max: 37 (08-11-23 @ 17:58)  HR: 68 (08-12-23 @ 05:30) (68 - 76)  BP: 109/59 (08-12-23 @ 05:30) (102/55 - 122/61)  RR: 18 (08-12-23 @ 05:30) (17 - 20)  SpO2: 95% (08-12-23 @ 05:30) (94% - 100%)  Wt(kg): --  I&O's Summary    11 Aug 2023 07:01  -  12 Aug 2023 07:00  --------------------------------------------------------  IN: 800 mL / OUT: 2000 mL / NET: -1200 mL        Appearance: Normal	  HEENT:   Normal oral mucosa, PERRL, EOMI	  Lymphatic: No lymphadenopathy  Cardiovascular: Normal S1 S2, No JVD, +murmurs, No edema  Respiratoryrhonchi  Psychiatry: A & O x 3, Mood & affect appropriate  Gastrointestinal:  Soft, Non-tender, + BS	  Skin: No rashes, No ecchymoses, No cyanosis	  Neurologic: Non-focal  Extremities: Normal range of motion, No clubbing, cyanosis or edema  Vascular: Peripheral pulses palpable 2+ bilaterally    MEDICATIONS  (STANDING):  cefTRIAXone   IVPB 1000 milliGRAM(s) IV Intermittent every 24 hours  escitalopram 5 milliGRAM(s) Oral daily  hydrocortisone hemorrhoidal Suppository 1 Suppository(s) Rectal two times a day  levothyroxine 137 MICROGram(s) Oral daily  metoprolol succinate  milliGRAM(s) Oral daily  midodrine. 10 milliGRAM(s) Oral three times a day  pantoprazole    Tablet 40 milliGRAM(s) Oral before breakfast  polyethylene glycol 3350 17 Gram(s) Oral two times a day  simvastatin 40 milliGRAM(s) Oral at bedtime      TELEMETRY: 	    ECG:  	  RADIOLOGY:  OTHER: 	  	  LABS:	 	    CARDIAC MARKERS:                          11.5   6.63  )-----------( 142      ( 11 Aug 2023 21:12 )             36.9     08-11    139  |  99  |  42<H>  ----------------------------<  81  3.5   |  25  |  2.36<H>    Ca    10.3      11 Aug 2023 21:12  Mg     1.7     08-11    TPro  7.0  /  Alb  3.4  /  TBili  0.5  /  DBili  x   /  AST  19  /  ALT  12  /  AlkPhos  137<H>  08-11    proBNP:   Lipid Profile:   HgA1c:   TSH:   PT/INR - ( 11 Aug 2023 21:11 )   PT: 19.5 sec;   INR: 1.80 ratio         PTT - ( 11 Aug 2023 01:23 )  PTT:36.4 sec      < from: 12 Lead ECG (08.11.23 @ 01:32) >  Diagnosis Line ATRIAL FIBRILLATION WITH OCCASIONAL ventricular-paced complexes  RIGHT SUPERIOR AXIS DEVIATION  PULMONARY DISEASE PATTERN  NONSPECIFIC T WAVE ABNORMALITY  ABNORMAL ECG  WHEN COMPARED WITH ECG OF 31-MAR-2023 09:50,  NOW WITH V PACED COMPLEXES      < from: CT Abdomen and Pelvis w/ IV Cont (08.11.23 @ 04:32) >  Abundant fecal material within the rectum with mild wall thickening and   trace surrounding stranding. Recommend clinical correlation to assess for   stercoral colitis. No bowel obstruction.    Cholelithiasis.    Findings of pulmonary edema with moderate right and small left pleural   effusion.    Additional findings mentioned above.    Culture - Blood (08.11.23 @ 00:55)    -  Blood PCR Panel: NEG   Gram Stain:   Growth in anaerobic bottle: Gram Variable Rods   Specimen Source: .Blood Blood-Venous   Organism: Blood Culture PCR   Culture Results:   Growth in anaerobic bottle: Clostridium cadaveris  Direct identification is available within approximately 3-5  hours either by Blood Panel Multiplexed PCR or Direct  MALDI-TOF. Details: [email or web address]   Organism Identification: Blood Culture PCR   Method Type: PCR    rectal bleed, CT with ?stercoral colitis  dysuria and positive u/a, UTI, no fever or WBC, has h/p MVR, MSSA bacteremia 3/2023 and had negative ERENDIRA, PPM extracted and s/p micra    * f/u the blood and urine cx  * c/w ceftriaxone for now  * monitor CBC/diff   * rectal bleed management as per the primary team    Assessment and plan  ---------------------------  Liam Garza is 79 y.o. M with PMHx Significant for HLD, A-fib, BPH, ESRD, CANDY, who presents to the ED with c.o. abd pain, rectal pain, and bloody stools.  Per patient, he has been experiencing abd pain and rectal bleeding onset 1 day ago.  Patient notes pain is localized to the suprapubic area, radiates to the RLQ.  He notes pain is intermittent and has had minimal improvement with Tylenol.  Nothing seems to exacerbate his pain.  His only other associated symptom is chills.  Otherwise he denies fevers, N/V, HA, changes in his vision, sore throat, SOB, CP, diarrhea or constipation. Blood per rectum is accumulated in his depends. Of note, patient notes rectal pain began in May which she describes as sharp and has been constant since onset.  pt with hx of chronic a.fib, chf systolic EF 35 to 40% s/p ppm ,MICRA sec to ppm exrtacction sec to + BC.  sob sec acute on chronic chf systolc, fluid overload  renal eval possible HD today  ?rectal bleed hold AC, GI eval  ASHD / A. FIb continue beta blocker   repeat echo with hx of pericardial effusion  clear liquid diet  ct ?colitis/ + ua start on abx/ ID noted  + BC ID follow up discussed with ACP  GI/ MED appreciated  pulmonary edema/ chf, repeat echo check MV, continue HD with increase fluid withdrawal as tolerated

## 2023-08-12 NOTE — CHART NOTE - NSCHARTNOTEFT_GEN_A_CORE
79M with PMHx Significant for HLD, A-fib on xarelto, BPH, ESRD, CANDY, who presents to the ED with c.o. abd pain, rectal pain, and bloody stools. Known by colorectal surgery for hemorrhoidal rectal bleeding. CT w/ findings of abundant fecal material in rectum and proctocolitis which is likely the source of bleeding, not his hemorrhoids.    Spoke to patient at bedside with nurse and explained the large fecal burden noticed on imaging and need for digital disimpaction at this time. Of note, he had 2x liquid/paste stools today with some blood and 1x liquid stool w/ a piece of hard stool noticed by daytime nurse. Digital rectal exam performed at bedside and was able to expel 3x hard stool balls as well as more of the liquid stool. Scant blood noticed at this time on examining glove. Patient denied pain during or after exam.     - cont bowel regimen  - monitor H/H  - care per primary team    [place] [id number]

## 2023-08-13 LAB
ANION GAP SERPL CALC-SCNC: 10 MMOL/L — SIGNIFICANT CHANGE UP (ref 5–17)
APTT BLD: 73.2 SEC — HIGH (ref 24.5–35.6)
BUN SERPL-MCNC: 29 MG/DL — HIGH (ref 7–23)
CALCIUM SERPL-MCNC: 10.2 MG/DL — SIGNIFICANT CHANGE UP (ref 8.4–10.5)
CHLORIDE SERPL-SCNC: 100 MMOL/L — SIGNIFICANT CHANGE UP (ref 96–108)
CO2 SERPL-SCNC: 27 MMOL/L — SIGNIFICANT CHANGE UP (ref 22–31)
CREAT SERPL-MCNC: 2.78 MG/DL — HIGH (ref 0.5–1.3)
EGFR: 23 ML/MIN/1.73M2 — LOW
GLUCOSE SERPL-MCNC: 90 MG/DL — SIGNIFICANT CHANGE UP (ref 70–99)
HCT VFR BLD CALC: 38.2 % — LOW (ref 39–50)
HCT VFR BLD CALC: 39.9 % — SIGNIFICANT CHANGE UP (ref 39–50)
HGB BLD-MCNC: 11.7 G/DL — LOW (ref 13–17)
HGB BLD-MCNC: 12.1 G/DL — LOW (ref 13–17)
MCHC RBC-ENTMCNC: 28.5 PG — SIGNIFICANT CHANGE UP (ref 27–34)
MCHC RBC-ENTMCNC: 28.7 PG — SIGNIFICANT CHANGE UP (ref 27–34)
MCHC RBC-ENTMCNC: 30.3 GM/DL — LOW (ref 32–36)
MCHC RBC-ENTMCNC: 30.6 GM/DL — LOW (ref 32–36)
MCV RBC AUTO: 92.9 FL — SIGNIFICANT CHANGE UP (ref 80–100)
MCV RBC AUTO: 94.8 FL — SIGNIFICANT CHANGE UP (ref 80–100)
NRBC # BLD: 0 /100 WBCS — SIGNIFICANT CHANGE UP (ref 0–0)
NRBC # BLD: 0 /100 WBCS — SIGNIFICANT CHANGE UP (ref 0–0)
PLATELET # BLD AUTO: 143 K/UL — LOW (ref 150–400)
PLATELET # BLD AUTO: 155 K/UL — SIGNIFICANT CHANGE UP (ref 150–400)
POTASSIUM SERPL-MCNC: 4.1 MMOL/L — SIGNIFICANT CHANGE UP (ref 3.5–5.3)
POTASSIUM SERPL-SCNC: 4.1 MMOL/L — SIGNIFICANT CHANGE UP (ref 3.5–5.3)
RBC # BLD: 4.11 M/UL — LOW (ref 4.2–5.8)
RBC # BLD: 4.21 M/UL — SIGNIFICANT CHANGE UP (ref 4.2–5.8)
RBC # FLD: 15.5 % — HIGH (ref 10.3–14.5)
RBC # FLD: 15.6 % — HIGH (ref 10.3–14.5)
SODIUM SERPL-SCNC: 137 MMOL/L — SIGNIFICANT CHANGE UP (ref 135–145)
UFH PPP CHRO-ACNC: 1.89 IU/ML — CRITICAL HIGH (ref 0.3–0.7)
WBC # BLD: 6.34 K/UL — SIGNIFICANT CHANGE UP (ref 3.8–10.5)
WBC # BLD: 7.37 K/UL — SIGNIFICANT CHANGE UP (ref 3.8–10.5)
WBC # FLD AUTO: 6.34 K/UL — SIGNIFICANT CHANGE UP (ref 3.8–10.5)
WBC # FLD AUTO: 7.37 K/UL — SIGNIFICANT CHANGE UP (ref 3.8–10.5)

## 2023-08-13 PROCEDURE — 99231 SBSQ HOSP IP/OBS SF/LOW 25: CPT | Mod: GC

## 2023-08-13 RX ORDER — HEPARIN SODIUM 5000 [USP'U]/ML
1200 INJECTION INTRAVENOUS; SUBCUTANEOUS
Qty: 25000 | Refills: 0 | Status: DISCONTINUED | OUTPATIENT
Start: 2023-08-13 | End: 2023-08-21

## 2023-08-13 RX ORDER — ACETAMINOPHEN 500 MG
650 TABLET ORAL ONCE
Refills: 0 | Status: COMPLETED | OUTPATIENT
Start: 2023-08-13 | End: 2023-08-13

## 2023-08-13 RX ADMIN — MIDODRINE HYDROCHLORIDE 10 MILLIGRAM(S): 2.5 TABLET ORAL at 12:47

## 2023-08-13 RX ADMIN — Medication 1 SUPPOSITORY(S): at 18:09

## 2023-08-13 RX ADMIN — MIDODRINE HYDROCHLORIDE 10 MILLIGRAM(S): 2.5 TABLET ORAL at 05:36

## 2023-08-13 RX ADMIN — Medication 650 MILLIGRAM(S): at 08:02

## 2023-08-13 RX ADMIN — Medication 650 MILLIGRAM(S): at 09:02

## 2023-08-13 RX ADMIN — PIPERACILLIN AND TAZOBACTAM 25 GRAM(S): 4; .5 INJECTION, POWDER, LYOPHILIZED, FOR SOLUTION INTRAVENOUS at 05:37

## 2023-08-13 RX ADMIN — HEPARIN SODIUM 11 UNIT(S)/HR: 5000 INJECTION INTRAVENOUS; SUBCUTANEOUS at 21:24

## 2023-08-13 RX ADMIN — PANTOPRAZOLE SODIUM 40 MILLIGRAM(S): 20 TABLET, DELAYED RELEASE ORAL at 05:36

## 2023-08-13 RX ADMIN — SENNA PLUS 2 TABLET(S): 8.6 TABLET ORAL at 21:01

## 2023-08-13 RX ADMIN — ESCITALOPRAM OXALATE 5 MILLIGRAM(S): 10 TABLET, FILM COATED ORAL at 12:47

## 2023-08-13 RX ADMIN — POLYETHYLENE GLYCOL 3350 17 GRAM(S): 17 POWDER, FOR SOLUTION ORAL at 05:36

## 2023-08-13 RX ADMIN — MIDODRINE HYDROCHLORIDE 10 MILLIGRAM(S): 2.5 TABLET ORAL at 18:08

## 2023-08-13 RX ADMIN — Medication 100 MILLIGRAM(S): at 05:36

## 2023-08-13 RX ADMIN — Medication 1 SUPPOSITORY(S): at 05:36

## 2023-08-13 RX ADMIN — PIPERACILLIN AND TAZOBACTAM 25 GRAM(S): 4; .5 INJECTION, POWDER, LYOPHILIZED, FOR SOLUTION INTRAVENOUS at 21:01

## 2023-08-13 RX ADMIN — HEPARIN SODIUM 12 UNIT(S)/HR: 5000 INJECTION INTRAVENOUS; SUBCUTANEOUS at 15:15

## 2023-08-13 RX ADMIN — Medication 137 MICROGRAM(S): at 05:36

## 2023-08-13 RX ADMIN — Medication 1000 MILLIGRAM(S): at 21:25

## 2023-08-13 RX ADMIN — PIPERACILLIN AND TAZOBACTAM 25 GRAM(S): 4; .5 INJECTION, POWDER, LYOPHILIZED, FOR SOLUTION INTRAVENOUS at 14:31

## 2023-08-13 RX ADMIN — SIMVASTATIN 40 MILLIGRAM(S): 20 TABLET, FILM COATED ORAL at 21:01

## 2023-08-13 NOTE — PROGRESS NOTE ADULT - SUBJECTIVE AND OBJECTIVE BOX
[facility]  Thanh Michaels PA-C  [address] [place] [id number] [phone]    INTERVAL HPI/ OVERNIGHT EVENTS:  pt s/e, reports feeling better   disimpacted last night   +Bm this morning with slight blood       MEDICATIONS  (STANDING):  escitalopram 5 milliGRAM(s) Oral daily  hydrocortisone hemorrhoidal Suppository 1 Suppository(s) Rectal two times a day  levothyroxine 137 MICROGram(s) Oral daily  mesalamine Suppository 1000 milliGRAM(s) Rectal at bedtime  metoprolol succinate  milliGRAM(s) Oral daily  midodrine. 10 milliGRAM(s) Oral three times a day  pantoprazole    Tablet 40 milliGRAM(s) Oral before breakfast  piperacillin/tazobactam IVPB.. 3.375 Gram(s) IV Intermittent every 8 hours  polyethylene glycol 3350 17 Gram(s) Oral two times a day  senna 2 Tablet(s) Oral at bedtime  simvastatin 40 milliGRAM(s) Oral at bedtime    MEDICATIONS  (PRN):  aluminum hydroxide/magnesium hydroxide/simethicone Suspension 30 milliLiter(s) Oral every 6 hours PRN Dyspepsia  midodrine. 10 milliGRAM(s) Oral <User Schedule> PRN PRIOR TO HD  sodium chloride 0.9% Bolus. 100 milliLiter(s) IV Bolus every 5 minutes PRN SBP LESS THAN or EQUAL to 90 mmHg  escitalopram 5 milliGRAM(s) Oral daily  hydrocortisone hemorrhoidal Suppository 1 Suppository(s) Rectal two times a day  levothyroxine 137 MICROGram(s) Oral daily  mesalamine Suppository 1000 milliGRAM(s) Rectal at bedtime  metoprolol succinate  milliGRAM(s) Oral daily  PAST MEDICAL & SURGICAL HISTORY:  Afib  not on anticoagulation      CAD (coronary artery disease)      Varicose vein of leg      Hypothyroid      Pacemaker      H/O fracture of hip        H/O asbestosis      HTN (hypertension)      Pacemaker  Medtronic - Model RVDR01 1/10/2012      Stented coronary artery  drug eluding stent 2007      H/O detached retina repair        S/P cataract surgery      History of hip surgery  left hip ORIF      H/O varicose vein stripping   left leg      History of left knee replacement   - multiple infections post op requiring reoperation in , , 2019)      H/O foot surgery  for infection of right foot 2019      H/O inguinal hernia repair  right       Family history:  No pertinent family history in first degree relatives    FH: skin cancer (Father)        Social History:   no etoh no cigs   no ivda    ROS:     General:  No wt loss, fevers, chills, night sweats, fatigue,   Eyes:  Good vision, no reported pain  ENT:  No sore throat, pain, runny nose, dysphagia  CV:  No pain, palpitations, hypo/hypertension  Resp:  No dyspnea, cough, tachypnea, wheezing  GI:  No pain, No nausea, No vomiting, No diarrhea, No constipation, No weight loss, No fever, No pruritis, No rectal bleeding, No tarry stools, No dysphagia,  :  No pain, bleeding, incontinence, nocturia  Muscle:  No pain, weakness  Neuro:  No weakness, tingling, memory problems  Psych:  No fatigue, insomnia, mood problems, depression  Endocrine:  No polyuria, polydipsia, cold/heat intolerance  Heme:  No petechiae, ecchymosis, easy bruisability  Skin:  No rash, tattoos, scars, edema      PHYSICAL EXAM:   Vital Signs Last 24 Hrs  T(C): 36.4 (13 Aug 2023 04:22), Max: 36.5 (12 Aug 2023 12:33)  T(F): 97.5 (13 Aug 2023 04:22), Max: 97.7 (12 Aug 2023 12:33)  HR: 79 (13 Aug 2023 04:22) (63 - 79)  BP: 111/73 (13 Aug 2023 04:22) (94/58 - 111/73)  BP(mean): --  RR: 18 (13 Aug 2023 04:22) (18 - 18)  SpO2: 97% (13 Aug 2023 04:22) (96% - 98%)    Parameters below as of 13 Aug 2023 04:22  Patient On (Oxygen Delivery Method): nasal cannula  O2 Flow (L/min): 3        Daily Height in cm: 170.18 (11 Aug 2023 00:36)    Daily     GENERAL:  Appears stated age, well-groomed, well-nourished, no distress  HEENT:  NC/AT,  conjunctivae clear and pink, no thyromegaly, nodules, adenopathy, no JVD, sclera -anicteric  CHEST:  Full & symmetric excursion, no increased effort, breath sounds clear  HEART:  Regular rhythm, S1, S2, no murmur/rub/S3/S4, no abdominal bruit, no edema  ABDOMEN:  Soft, non-tender, non-distended, normoactive bowel sounds,  no masses ,no hepato-splenomegaly, no signs of chronic liver disease  EXTEREMITIES:  no cyanosis,clubbing or edema  SKIN:  No rash/erythema/ecchymoses/petechiae/wounds/abscess/warm/dry  NEURO:  Alert, oriented, no asterixis, no tremor, no encephalopathy    LABS:                        12.1   6.34  )-----------( 143      ( 13 Aug 2023 06:44 )             39.9                         08    137  |  100  |  29<H>  ----------------------------<  90  4.1   |  27  |  2.78<H>    Ca    10.2      13 Aug 2023 06:43            8-11    LIVER FUNCTIONS - ( 11 Aug 2023 01:23 )  Alb: 3.4 g/dL / Pro: 7.0 g/dL / ALK PHOS: 137 U/L / ALT: 12 U/L / AST: 19 U/L / GGT: x           PT/INR - ( 11 Aug 2023 01:23 )   PT: 23.5 sec;   INR: 2.29 ratio         PTT - ( 11 Aug 2023 01:23 )  PTT:36.4 sec  Urinalysis Basic - ( 11 Aug 2023 06:13 )    Color: Dark Yellow / Appearance: Turbid / S.017 / pH: x  Gluc: x / Ketone: Negative  / Bili: Small / Urobili: 3 mg/dL   Blood: x / Protein: 100 mg/dL / Nitrite: Positive   Leuk Esterase: Large / RBC: 463 /hpf /  /HPF   Sq Epi: x / Non Sq Epi: x / Bacteria: Many      Amylase Serum--      Lipase serum12       Ammonia--      Imaging:    < from: NM GI Bleed Localization (NM GI Bleed Localization .) (23 @ 17:40) >  ACC: [id number] EXAM:  NM GI BLEEDING IMG   ORDERED BY: JAYLENE PARKS     PROCEDURE DATE:  2023          INTERPRETATION:  RADIOPHARMACEUTICAL:  20.0 mCi Tc-99m labeled autologous   red blood cells, I.V.    CLINICAL INFORMATION: GI Bleeding referred for localization of bleeding   site.    TECHNIQUE: Dynamic imaging of the abdomen and pelvis was performed for 2   hours following administration of radiolabeled autologous red blood   cells. Static images of the abdomen and pelvis in the anterior and   lateral views were obtained immediately thereafter. An Anterior view of   the head/neck was obtained for  purposes.    COMPARISON: None.    FINDINGS: There is physiologic distribution of radiolabeled red cells in   the abdomen and pelvis. There is no abnormal focus of increased   radiotracer activity to suggest the presence of an active bleeding site.    IMPRESSION:    No evidence of active bleeding site.     These results were discussed with DASIA Parks NP, by Dr. BARBRA Natarajan via   telephone with read back at about 6:00 PM on 2023.    --- End of Report ---            HAM NATARAJAN MD; Attending Nuclear Medicine  This document has been electronically signed. Aug 11 2023  6:01PM    < end of copied text >

## 2023-08-13 NOTE — PROGRESS NOTE ADULT - ASSESSMENT
78  year old male    h/o  CAD s/p stent , asa.     A-fib/ PPM, , hypothyroid, and total left knee replacement    prior ppm  interrogation  with  WCT   Ct chest, retrosternal hematoma.  mod left pl effusion/ has   c/c leg  pain/  h/o  non complaince  with meds       admitted  with  abd pain and rectal bleeding onset 1 day ago.. arrive s form n home     has  no pain  now    c/c   systolic  and  diastolic  chf,,  cardiomyopathy,. Mitral  valvuloplasty ,  h/o  c/c  l/edema  of L  leg    h/o    c/c AFIB,   has  PPM         eliquis   on  hold  now      h/o c/c   Anemia, , hb is  12.  gi   known to  dr joann INFANTE,     cath, with 2 vessel disease,  s/p  CABG and  MVR  surg d r marni      echo,  on 7/2022,  ef  35/ new/ severe  TR,  and ,  cath,   was non  obstructive     s/p   MSSA  bacterinia , in march 2023, completed  iv ancef..    s/p   explant pf  ppm  and  Micra  placement on  3/30/23. dr sunitha rocha      Echo,  3/2023,  ef  25,  mod  AI. severe  TR . RV hypokinesis    BEULAH, from  AIN, dx  recently , ancef / was  switched   to iv vanco,   CKD, on HD,  has  right Permcath.  hpuse renal   s/p  renal bx on 4/ 14.. 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 + , Ig A  Lamda. ,  known to heme  dr dobson.  pe r heme,  no  need  for  b marrow  bx    cxr/  CT chest,  pl  effusion,  will need  HD  gi    f/p,  no  bleeding  now/  nuclear  scan, no  bleeding   bacteremia   Clostridium cadeveris,  iv zosun now, pe r ID/ /  significance, has  Micra    echo opending/  discussed  with girlfriend                     card /  dr mikayla dick       < from: Intra-Operative Transesophageal Echo W or WO Ultrasound Enhancing Agent (03.30.23 @ 12:48) >  --------------------------------------------------------------------------------  PRE-BYPASS FINDINGS  Left Ventricle:  Left ventricular ejection fraction is estimated at 25 to 30%. There is severe kypokinesis in the left ventricle.  Right Ventricle:  Moderately reduced systolic function. Moderately reduced systolic function. There is moderate hypokinesis in the right ventricle.  Left Atrium:  Diffuse smoke visualized in LA.  Aortic Valve:  The aortic valve appears trileaflet. There is moderate aortic regurgitation.  No obvious vegetations visualized  Mitral Valve:  Bioprosthetic valve is present in the mitral position. There is calcification of the mitral valve annulus.  No obvious vegetations visualized.  Tricuspid Valve:  There is moderate-severe tricuspid regurgitation. TR unchanged following lead extraction.  Pulmonic Valve:  There is mild pulmonic regurgitation.  Pericardium:  No pericardial effusion seen. No pericardial effusion visualized before and after lead extraction.  Electronically signed by Jimmy Gambino on 3/30/2023 at 3:00:58 PM  *** Final ***  < end of copied text >

## 2023-08-13 NOTE — PROGRESS NOTE ADULT - ATTENDING COMMENTS
Feeling better after disimpaction yesterday.  Tolerating clears.  Abd soft, non distended, non tender.  Labs and vitals reviewed    - needs aggressive bowel regimen  - monitor for further bleeding  - care per primary team    Briseyda Azar MD

## 2023-08-13 NOTE — PROGRESS NOTE ADULT - SUBJECTIVE AND OBJECTIVE BOX
date of service: 08-13-23 @ 11:04  afebrile  REVIEW OF SYSTEMS:  CONSTITUTIONAL: No fever,  no  weight loss  ENT:  No  tinnitus,   no   vertigo  NECK: No pain or stiffness  RESPIRATORY: No cough, wheezing, chills or hemoptysis;    No Shortness of Breath  CARDIOVASCULAR: No chest pain, palpitations, dizziness  GASTROINTESTINAL: No abdominal or epigastric pain. No nausea, vomiting, or hematemesis; No diarrhea  No melena or hematochezia.  GENITOURINARY: No dysuria, frequency, hematuria, or incontinence  NEUROLOGICAL: No headaches  SKIN: No itching,  no   rash  LYMPH Nodes: No enlarged glands  ENDOCRINE: No heat or cold intolerance  MUSCULOSKELETAL: No joint pain or swelling  PSYCHIATRIC: No depression, anxiety  HEME/LYMPH: No easy bruising, or bleeding gums  ALLERGY AND IMMUNOLOGIC: No hives or eczema	    MEDICATIONS  (STANDING):  escitalopram 5 milliGRAM(s) Oral daily  hydrocortisone hemorrhoidal Suppository 1 Suppository(s) Rectal two times a day  levothyroxine 137 MICROGram(s) Oral daily  mesalamine Suppository 1000 milliGRAM(s) Rectal at bedtime  metoprolol succinate  milliGRAM(s) Oral daily  midodrine. 10 milliGRAM(s) Oral three times a day  pantoprazole    Tablet 40 milliGRAM(s) Oral before breakfast  piperacillin/tazobactam IVPB.. 3.375 Gram(s) IV Intermittent every 8 hours  polyethylene glycol 3350 17 Gram(s) Oral two times a day  senna 2 Tablet(s) Oral at bedtime  simvastatin 40 milliGRAM(s) Oral at bedtime    MEDICATIONS  (PRN):  aluminum hydroxide/magnesium hydroxide/simethicone Suspension 30 milliLiter(s) Oral every 6 hours PRN Dyspepsia  midodrine. 10 milliGRAM(s) Oral <User Schedule> PRN PRIOR TO HD  sodium chloride 0.9% Bolus. 100 milliLiter(s) IV Bolus every 5 minutes PRN SBP LESS THAN or EQUAL to 90 mmHg      Vital Signs Last 24 Hrs  T(C): 36.4 (13 Aug 2023 04:22), Max: 36.5 (12 Aug 2023 12:33)  T(F): 97.5 (13 Aug 2023 04:22), Max: 97.7 (12 Aug 2023 12:33)  HR: 79 (13 Aug 2023 04:22) (63 - 79)  BP: 111/73 (13 Aug 2023 04:22) (94/58 - 111/73)  BP(mean): --  RR: 18 (13 Aug 2023 04:22) (18 - 18)  SpO2: 97% (13 Aug 2023 04:22) (96% - 98%)    Parameters below as of 13 Aug 2023 04:22  Patient On (Oxygen Delivery Method): nasal cannula  O2 Flow (L/min): 3    CAPILLARY BLOOD GLUCOSE        I&O's Summary    12 Aug 2023 07:01  -  13 Aug 2023 07:00  --------------------------------------------------------  IN: 1060 mL / OUT: 75 mL / NET: 985 mL          Appearance: Normal	  HEENT:   Normal oral mucosa, PERRL, EOMI	  Lymphatic: No lymphadenopathy  Cardiovascular: Normal S1 S2, No JVD  Respiratory: Lungs clear to auscultation	  Gastrointestinal:  Soft, Non-tender, + BS	  Skin: No rash, No ecchymoses	  Extremities:     LABS:                        12.1   6.34  )-----------( 143      ( 13 Aug 2023 06:44 )             39.9     08-13    137  |  100  |  29<H>  ----------------------------<  90  4.1   |  27  |  2.78<H>    Ca    10.2      13 Aug 2023 06:43      PT/INR - ( 11 Aug 2023 21:11 )   PT: 19.5 sec;   INR: 1.80 ratio               Urinalysis Basic - ( 13 Aug 2023 06:43 )    Color: x / Appearance: x / SG: x / pH: x  Gluc: 90 mg/dL / Ketone: x  / Bili: x / Urobili: x   Blood: x / Protein: x / Nitrite: x   Leuk Esterase: x / RBC: x / WBC x   Sq Epi: x / Non Sq Epi: x / Bacteria: x                      Consultant(s) Notes Reviewed:      Care Discussed with Consultants/Other Providers:

## 2023-08-13 NOTE — PROGRESS NOTE ADULT - ASSESSMENT
79M with PMHx Significant for HLD, A-fib on xarelto, BPH, ESRD, CANDY, who presents to the ED with c.o. abd pain, rectal pain, and bloody stools. Known by colorectal surgery for hemorrhoidal rectal bleeding. CT w/ findings of abundant fecal material in rectum and proctocolitis which is likely the source of bleeding, not his hemorrhoids.    RECOMMENDATIONS:  - No acute surgical intervention  - c/w aggressive bowel regimen  - Fecal disimpaction for stool ball in rectum  - No IR intervention needed at this time - hemorrhoidal bleeding is unlikely at this time  - GI eval - possible scope once fecal burden improves  - Trend H&H   - Colorectal surgery will follow  - Care per primary team    [facility]  [id number]   79M with PMHx Significant for HLD, A-fib on xarelto, BPH, ESRD, CANDY, who presents to the ED with c.o. abd pain, rectal pain, and bloody stools. Known by colorectal surgery for hemorrhoidal rectal bleeding. CT w/ findings of abundant fecal material in rectum and proctocolitis which is likely the source of bleeding, not his hemorrhoids.    RECOMMENDATIONS:  - No acute surgical intervention  - c/w aggressive bowel regimen  - Fecal disimpaction for stool ball in rectum  - No IR intervention needed at this time - hemorrhoidal bleeding is unlikely at this time  - GI eval - possible scope once fecal burden improves  - Trend H&H   - Colorectal surgery will follow  - Care per primary team  - Patient having bowel movements without blood following VIANNEY. No surgical interventions indicated. Green team to sign off.     [facility]  [phone]

## 2023-08-13 NOTE — PROGRESS NOTE ADULT - SUBJECTIVE AND OBJECTIVE BOX
Date of Service: 08-13-23 @ 06:06           CARDIOLOGY     PROGRESS  NOTE   ________________________________________________    CHIEF COMPLAINT:Patient is a 78y old  Male who presents with a chief complaint of abdominal pain/ rectal bleed (13 Aug 2023 03:04)  doing better  	  REVIEW OF SYSTEMS:  CONSTITUTIONAL: No fever, weight loss, or fatigue  EYES: No eye pain, visual disturbances, or discharge  ENT:  No difficulty hearing, tinnitus, vertigo; No sinus or throat pain  NECK: No pain or stiffness  RESPIRATORY: No cough, wheezing, chills or hemoptysis; No Shortness of Breath  CARDIOVASCULAR: No chest pain, palpitations, passing out, dizziness, or leg swelling  GASTROINTESTINAL: No abdominal or epigastric pain. No nausea, vomiting, or hematemesis; No diarrhea or constipation. No melena or hematochezia.  GENITOURINARY: No dysuria, frequency, hematuria, or incontinence  NEUROLOGICAL: No headaches, memory loss, loss of strength, numbness, or tremors  SKIN: No itching, burning, rashes, or lesions   LYMPH Nodes: No enlarged glands  ENDOCRINE: No heat or cold intolerance; No hair loss  MUSCULOSKELETAL: No joint pain or swelling; No muscle, back, or extremity pain  PSYCHIATRIC: No depression, anxiety, mood swings, or difficulty sleeping  HEME/LYMPH: No easy bruising, or bleeding gums  ALLERGY AND IMMUNOLOGIC: No hives or eczema	    [ x] All others negative	  [ ] Unable to obtain    PHYSICAL EXAM:  T(C): 36.4 (08-13-23 @ 04:22), Max: 36.5 (08-12-23 @ 12:33)  HR: 79 (08-13-23 @ 04:22) (63 - 79)  BP: 111/73 (08-13-23 @ 04:22) (94/58 - 111/73)  RR: 18 (08-13-23 @ 04:22) (18 - 18)  SpO2: 97% (08-13-23 @ 04:22) (96% - 98%)  Wt(kg): --  I&O's Summary    11 Aug 2023 07:01  -  12 Aug 2023 07:00  --------------------------------------------------------  IN: 800 mL / OUT: 2000 mL / NET: -1200 mL    12 Aug 2023 07:01  -  13 Aug 2023 06:06  --------------------------------------------------------  IN: 1060 mL / OUT: 0 mL / NET: 1060 mL        Appearance: Normal	  HEENT:   Normal oral mucosa, PERRL, EOMI	  Lymphatic: No lymphadenopathy  Cardiovascular: Normal S1 S2, No JVD, + murmurs, No edema  Respiratory: rhonchi  Psychiatry: A & O x 3, Mood & affect appropriate  Gastrointestinal:  Soft, Non-tender, + BS	  Skin: No rashes, No ecchymoses, No cyanosis	  Neurologic: Non-focal  Extremities: Normal range of motion, No clubbing, cyanosis or edema  Vascular: Peripheral pulses palpable 2+ bilaterally    MEDICATIONS  (STANDING):  escitalopram 5 milliGRAM(s) Oral daily  hydrocortisone hemorrhoidal Suppository 1 Suppository(s) Rectal two times a day  levothyroxine 137 MICROGram(s) Oral daily  mesalamine Suppository 1000 milliGRAM(s) Rectal at bedtime  metoprolol succinate  milliGRAM(s) Oral daily  midodrine. 10 milliGRAM(s) Oral three times a day  pantoprazole    Tablet 40 milliGRAM(s) Oral before breakfast  piperacillin/tazobactam IVPB.. 3.375 Gram(s) IV Intermittent every 8 hours  polyethylene glycol 3350 17 Gram(s) Oral two times a day  senna 2 Tablet(s) Oral at bedtime  simvastatin 40 milliGRAM(s) Oral at bedtime      TELEMETRY: 	    ECG:  	  RADIOLOGY:  OTHER: 	  	  LABS:	 	    CARDIAC MARKERS:                                11.7   7.18  )-----------( 137      ( 12 Aug 2023 11:24 )             38.6     08-11    139  |  99  |  42<H>  ----------------------------<  81  3.5   |  25  |  2.36<H>    Ca    10.3      11 Aug 2023 21:12      proBNP:   Lipid Profile:   HgA1c:   TSH:   PT/INR - ( 11 Aug 2023 21:11 )   PT: 19.5 sec;   INR: 1.80 ratio         #Clostridium cadaveris bacteremia  #Stercoral colitis  #Constipation  #UTI  #Elevated serum creatinine  - Would repeat 2 sets of BCx  - Continue with Zosyn.  - Consider TTE    plan  in and out  ppi once a day  bowel regimen  gerd precautions  start bowel regimen  add lactulose 10cc q 6 hours and glycerin suppository  gas pill with simethicone and maalox q 6 hours  monitor stool output   consider ct angio d/w dr saenz maybe a lowere maisha bleed  NM GI Bleeding scan noted, no evidence of active bleeding  surgery following  will follow       Assessment and plan  ---------------------------  Liam Garza is 79 y.o. M with PMHx Significant for HLD, A-fib, BPH, ESRD, CANDY, who presents to the ED with c.o. abd pain, rectal pain, and bloody stools.  Per patient, he has been experiencing abd pain and rectal bleeding onset 1 day ago.  Patient notes pain is localized to the suprapubic area, radiates to the RLQ.  He notes pain is intermittent and has had minimal improvement with Tylenol.  Nothing seems to exacerbate his pain.  His only other associated symptom is chills.  Otherwise he denies fevers, N/V, HA, changes in his vision, sore throat, SOB, CP, diarrhea or constipation. Blood per rectum is accumulated in his depends. Of note, patient notes rectal pain began in May which she describes as sharp and has been constant since onset.  pt with hx of chronic a.fib, chf systolic EF 35 to 40% s/p ppm ,MICRA sec to ppm exrtacction sec to + BC.  sob sec acute on chronic chf systolc, fluid overload  renal eval possible HD today  ?rectal bleed hold AC, GI eval  ASHD / A. FIb continue beta blocker   repeat echo with hx of pericardial effusion  clear liquid diet  ct ?colitis/ + ua start on abx/ ID noted  + BC ID follow up discussed with ACP  GI/ MED appreciated  pulmonary edema/ chf, repeat echo check MV, continue HD with increase fluid withdrawal as tolerated  abx switches do Zosyn, ID appreciated  awaiting TTE  will add ace if bp can tolerate  may start on heparin /AC sec to hx of a.fib, if GI clears the pt  advance diet asc tolerated

## 2023-08-13 NOTE — PROGRESS NOTE ADULT - ASSESSMENT
a/p colitis  constipation    plan  in and out  ppi once a day  bowel regimen  gerd precautions  start bowel regimen  add lactulose 10cc q 6 hours and glycerin suppository  gas pill with simethicone and maalox q 6 hours  monitor stool output  surgery following  Disimpacted last night   NM GI Bleeding scan noted, no evidence of active bleeding  Hgb stable  Can start on A/c, hx of Afib, primary team aware  monitor for overt GI bleeding   will follow       Advanced care planning was discussed with patient and family.  Advanced care planning forms were reviewed and discussed.  Risks, benefits and alternatives of gastroenterologic procedures were discussed in detail and all questions were answered.    30 minutes spent.

## 2023-08-13 NOTE — PROGRESS NOTE ADULT - SUBJECTIVE AND OBJECTIVE BOX
[facility] Daily Resident Progress Note    OVERNIGHT: Performed digital rectal disimpaction and removed x3 hard stools. No acute events o/n otherwise    SUBJECTIVE: Pt seen and examined at bedside.     OBJECTIVE:  Vital Signs Last 24 Hrs  T(C): 36.1 (11 Aug 2023 23:58), Max: 37 (11 Aug 2023 17:58)  T(F): 97 (11 Aug 2023 23:58), Max: 98.6 (11 Aug 2023 17:58)  HR: 73 (11 Aug 2023 23:58) (61 - 77)  BP: 102/55 (11 Aug 2023 23:58) (102/55 - 132/74)  BP(mean): --  RR: 18 (11 Aug 2023 23:58) (14 - 20)  SpO2: 95% (11 Aug 2023 23:58) (94% - 100%)    Parameters below as of 11 Aug 2023 23:58  Patient On (Oxygen Delivery Method): nasal cannula  O2 Flow (L/min): 3    Physical Exam:  General: NAD, Lying in bed comfortably  Neuro: Alert and answering questions appropriately   HEENT: Grossly normal, EOMI  Cardio: No peripherial edema  Resp: Good effort, no signs of respiratory distress  Abd: Soft, NT/ND, no rebound/guarding, no masses palpated      Medications:  MEDICATIONS  (STANDING):  cefTRIAXone   IVPB 1000 milliGRAM(s) IV Intermittent every 24 hours  escitalopram 5 milliGRAM(s) Oral daily  hydrocortisone hemorrhoidal Suppository 1 Suppository(s) Rectal two times a day  levothyroxine 137 MICROGram(s) Oral daily  metoprolol succinate  milliGRAM(s) Oral daily  midodrine. 10 milliGRAM(s) Oral three times a day  pantoprazole    Tablet 40 milliGRAM(s) Oral before breakfast  polyethylene glycol 3350 17 Gram(s) Oral two times a day  simvastatin 40 milliGRAM(s) Oral at bedtime    MEDICATIONS  (PRN):  midodrine. 10 milliGRAM(s) Oral <User Schedule> PRN PRIOR TO HD  sodium chloride 0.9% Bolus. 100 milliLiter(s) IV Bolus every 5 minutes PRN SBP LESS THAN or EQUAL to 90 mmHg    Allergies:  Myrbetriq (Hives)  tamsulosin (Hives)  alfuzosin (Hives)  levofloxacin (Hives)      Labs:  08-11    139  |  99  |  42<H>  ----------------------------<  81  3.5   |  25  |  2.36<H>    Ca    10.3      11 Aug 2023 21:12  Mg     1.7     08-11    TPro  7.0  /  Alb  3.4  /  TBili  0.5  /  DBili  x   /  AST  19  /  ALT  12  /  AlkPhos  137<H>  08-11                          11.5   6.63  )-----------( 142      ( 11 Aug 2023 21:12 )             36.9     PT/INR - ( 11 Aug 2023 21:11 )   PT: 19.5 sec;   INR: 1.80 ratio         PTT - ( 11 Aug 2023 01:23 )  PTT:36.4 sec [facility] Daily Resident Progress Note    OVERNIGHT: Performed digital rectal disimpaction and removed x3 hard stools. No acute events o/n otherwise    SUBJECTIVE: Pt seen and examined at bedside. Non-bloody stool present.    OBJECTIVE:  Vital Signs Last 24 Hrs  T(C): 36.1 (11 Aug 2023 23:58), Max: 37 (11 Aug 2023 17:58)  T(F): 97 (11 Aug 2023 23:58), Max: 98.6 (11 Aug 2023 17:58)  HR: 73 (11 Aug 2023 23:58) (61 - 77)  BP: 102/55 (11 Aug 2023 23:58) (102/55 - 132/74)  BP(mean): --  RR: 18 (11 Aug 2023 23:58) (14 - 20)  SpO2: 95% (11 Aug 2023 23:58) (94% - 100%)    Parameters below as of 11 Aug 2023 23:58  Patient On (Oxygen Delivery Method): nasal cannula  O2 Flow (L/min): 3    Physical Exam:  General: NAD, Lying in bed comfortably  Neuro: Alert and answering questions appropriately   HEENT: Grossly normal, EOMI  Cardio: No peripherial edema  Resp: Good effort, no signs of respiratory distress  Abd: Soft, NT/ND, no rebound/guarding, no masses palpated      Medications:  MEDICATIONS  (STANDING):  cefTRIAXone   IVPB 1000 milliGRAM(s) IV Intermittent every 24 hours  escitalopram 5 milliGRAM(s) Oral daily  hydrocortisone hemorrhoidal Suppository 1 Suppository(s) Rectal two times a day  levothyroxine 137 MICROGram(s) Oral daily  metoprolol succinate  milliGRAM(s) Oral daily  midodrine. 10 milliGRAM(s) Oral three times a day  pantoprazole    Tablet 40 milliGRAM(s) Oral before breakfast  polyethylene glycol 3350 17 Gram(s) Oral two times a day  simvastatin 40 milliGRAM(s) Oral at bedtime    MEDICATIONS  (PRN):  midodrine. 10 milliGRAM(s) Oral <User Schedule> PRN PRIOR TO HD  sodium chloride 0.9% Bolus. 100 milliLiter(s) IV Bolus every 5 minutes PRN SBP LESS THAN or EQUAL to 90 mmHg    Allergies:  Myrbetriq (Hives)  tamsulosin (Hives)  alfuzosin (Hives)  levofloxacin (Hives)      Labs:  08-11    139  |  99  |  42<H>  ----------------------------<  81  3.5   |  25  |  2.36<H>    Ca    10.3      11 Aug 2023 21:12  Mg     1.7     08-11    TPro  7.0  /  Alb  3.4  /  TBili  0.5  /  DBili  x   /  AST  19  /  ALT  12  /  AlkPhos  137<H>  08-11                          11.5   6.63  )-----------( 142      ( 11 Aug 2023 21:12 )             36.9     PT/INR - ( 11 Aug 2023 21:11 )   PT: 19.5 sec;   INR: 1.80 ratio         PTT - ( 11 Aug 2023 01:23 )  PTT:36.4 sec

## 2023-08-14 LAB
-  AMIKACIN: SIGNIFICANT CHANGE UP
-  AMOXICILLIN/CLAVULANIC ACID: SIGNIFICANT CHANGE UP
-  AMPICILLIN/SULBACTAM: SIGNIFICANT CHANGE UP
-  AMPICILLIN: SIGNIFICANT CHANGE UP
-  AZTREONAM: SIGNIFICANT CHANGE UP
-  CEFAZOLIN: SIGNIFICANT CHANGE UP
-  CEFEPIME: SIGNIFICANT CHANGE UP
-  CEFTRIAXONE: SIGNIFICANT CHANGE UP
-  CEFUROXIME: SIGNIFICANT CHANGE UP
-  CIPROFLOXACIN: SIGNIFICANT CHANGE UP
-  ERTAPENEM: SIGNIFICANT CHANGE UP
-  GENTAMICIN: SIGNIFICANT CHANGE UP
-  IMIPENEM: SIGNIFICANT CHANGE UP
-  LEVOFLOXACIN: SIGNIFICANT CHANGE UP
-  MEROPENEM: SIGNIFICANT CHANGE UP
-  NITROFURANTOIN: SIGNIFICANT CHANGE UP
-  PIPERACILLIN/TAZOBACTAM: SIGNIFICANT CHANGE UP
-  STAPHYLOCOCCUS EPIDERMIDIS, METHICILLIN RESISTANT: SIGNIFICANT CHANGE UP
-  TOBRAMYCIN: SIGNIFICANT CHANGE UP
-  TRIMETHOPRIM/SULFAMETHOXAZOLE: SIGNIFICANT CHANGE UP
ANION GAP SERPL CALC-SCNC: 14 MMOL/L — SIGNIFICANT CHANGE UP (ref 5–17)
APTT BLD: 43.9 SEC — HIGH (ref 24.5–35.6)
APTT BLD: 62.6 SEC — HIGH (ref 24.5–35.6)
APTT BLD: 92.5 SEC — HIGH (ref 24.5–35.6)
APTT BLD: 92.5 SEC — HIGH (ref 24.5–35.6)
BUN SERPL-MCNC: 34 MG/DL — HIGH (ref 7–23)
CALCIUM SERPL-MCNC: 9.5 MG/DL — SIGNIFICANT CHANGE UP (ref 8.4–10.5)
CHLORIDE SERPL-SCNC: 100 MMOL/L — SIGNIFICANT CHANGE UP (ref 96–108)
CO2 SERPL-SCNC: 21 MMOL/L — LOW (ref 22–31)
CREAT SERPL-MCNC: 3.46 MG/DL — HIGH (ref 0.5–1.3)
CULTURE RESULTS: SIGNIFICANT CHANGE UP
EGFR: 17 ML/MIN/1.73M2 — LOW
GLUCOSE SERPL-MCNC: 71 MG/DL — SIGNIFICANT CHANGE UP (ref 70–99)
GRAM STN FLD: SIGNIFICANT CHANGE UP
HCT VFR BLD CALC: 34.4 % — LOW (ref 39–50)
HCT VFR BLD CALC: 35.3 % — LOW (ref 39–50)
HGB BLD-MCNC: 10.5 G/DL — LOW (ref 13–17)
HGB BLD-MCNC: 10.8 G/DL — LOW (ref 13–17)
MCHC RBC-ENTMCNC: 28.4 PG — SIGNIFICANT CHANGE UP (ref 27–34)
MCHC RBC-ENTMCNC: 28.7 PG — SIGNIFICANT CHANGE UP (ref 27–34)
MCHC RBC-ENTMCNC: 30.5 GM/DL — LOW (ref 32–36)
MCHC RBC-ENTMCNC: 30.6 GM/DL — LOW (ref 32–36)
MCV RBC AUTO: 92.9 FL — SIGNIFICANT CHANGE UP (ref 80–100)
MCV RBC AUTO: 94 FL — SIGNIFICANT CHANGE UP (ref 80–100)
METHOD TYPE: SIGNIFICANT CHANGE UP
METHOD TYPE: SIGNIFICANT CHANGE UP
NRBC # BLD: 0 /100 WBCS — SIGNIFICANT CHANGE UP (ref 0–0)
NRBC # BLD: 0 /100 WBCS — SIGNIFICANT CHANGE UP (ref 0–0)
ORGANISM # SPEC MICROSCOPIC CNT: SIGNIFICANT CHANGE UP
ORGANISM # SPEC MICROSCOPIC CNT: SIGNIFICANT CHANGE UP
PLATELET # BLD AUTO: 145 K/UL — LOW (ref 150–400)
PLATELET # BLD AUTO: 146 K/UL — LOW (ref 150–400)
POTASSIUM SERPL-MCNC: 3.5 MMOL/L — SIGNIFICANT CHANGE UP (ref 3.5–5.3)
POTASSIUM SERPL-SCNC: 3.5 MMOL/L — SIGNIFICANT CHANGE UP (ref 3.5–5.3)
RBC # BLD: 3.66 M/UL — LOW (ref 4.2–5.8)
RBC # BLD: 3.8 M/UL — LOW (ref 4.2–5.8)
RBC # FLD: 15.5 % — HIGH (ref 10.3–14.5)
RBC # FLD: 15.6 % — HIGH (ref 10.3–14.5)
SODIUM SERPL-SCNC: 135 MMOL/L — SIGNIFICANT CHANGE UP (ref 135–145)
SPECIMEN SOURCE: SIGNIFICANT CHANGE UP
WBC # BLD: 5.75 K/UL — SIGNIFICANT CHANGE UP (ref 3.8–10.5)
WBC # BLD: 5.92 K/UL — SIGNIFICANT CHANGE UP (ref 3.8–10.5)
WBC # FLD AUTO: 5.75 K/UL — SIGNIFICANT CHANGE UP (ref 3.8–10.5)
WBC # FLD AUTO: 5.92 K/UL — SIGNIFICANT CHANGE UP (ref 3.8–10.5)

## 2023-08-14 PROCEDURE — 99232 SBSQ HOSP IP/OBS MODERATE 35: CPT | Mod: GC

## 2023-08-14 PROCEDURE — 99233 SBSQ HOSP IP/OBS HIGH 50: CPT | Mod: GC

## 2023-08-14 RX ORDER — ERTAPENEM SODIUM 1 G/1
500 INJECTION, POWDER, LYOPHILIZED, FOR SOLUTION INTRAMUSCULAR; INTRAVENOUS EVERY 24 HOURS
Refills: 0 | Status: COMPLETED | OUTPATIENT
Start: 2023-08-14 | End: 2023-08-16

## 2023-08-14 RX ORDER — MIDODRINE HYDROCHLORIDE 2.5 MG/1
20 TABLET ORAL
Refills: 0 | Status: DISCONTINUED | OUTPATIENT
Start: 2023-08-14 | End: 2023-08-25

## 2023-08-14 RX ORDER — VANCOMYCIN HCL 1 G
1000 VIAL (EA) INTRAVENOUS ONCE
Refills: 0 | Status: COMPLETED | OUTPATIENT
Start: 2023-08-14 | End: 2023-08-14

## 2023-08-14 RX ADMIN — PANTOPRAZOLE SODIUM 40 MILLIGRAM(S): 20 TABLET, DELAYED RELEASE ORAL at 05:07

## 2023-08-14 RX ADMIN — HEPARIN SODIUM 10 UNIT(S)/HR: 5000 INJECTION INTRAVENOUS; SUBCUTANEOUS at 07:33

## 2023-08-14 RX ADMIN — ERTAPENEM SODIUM 100 MILLIGRAM(S): 1 INJECTION, POWDER, LYOPHILIZED, FOR SOLUTION INTRAMUSCULAR; INTRAVENOUS at 20:25

## 2023-08-14 RX ADMIN — PIPERACILLIN AND TAZOBACTAM 25 GRAM(S): 4; .5 INJECTION, POWDER, LYOPHILIZED, FOR SOLUTION INTRAVENOUS at 05:08

## 2023-08-14 RX ADMIN — MIDODRINE HYDROCHLORIDE 10 MILLIGRAM(S): 2.5 TABLET ORAL at 13:39

## 2023-08-14 RX ADMIN — POLYETHYLENE GLYCOL 3350 17 GRAM(S): 17 POWDER, FOR SOLUTION ORAL at 05:08

## 2023-08-14 RX ADMIN — HEPARIN SODIUM 10 UNIT(S)/HR: 5000 INJECTION INTRAVENOUS; SUBCUTANEOUS at 11:35

## 2023-08-14 RX ADMIN — HEPARIN SODIUM 8 UNIT(S)/HR: 5000 INJECTION INTRAVENOUS; SUBCUTANEOUS at 20:01

## 2023-08-14 RX ADMIN — HEPARIN SODIUM 10 UNIT(S)/HR: 5000 INJECTION INTRAVENOUS; SUBCUTANEOUS at 03:42

## 2023-08-14 RX ADMIN — POLYETHYLENE GLYCOL 3350 17 GRAM(S): 17 POWDER, FOR SOLUTION ORAL at 17:48

## 2023-08-14 RX ADMIN — ESCITALOPRAM OXALATE 5 MILLIGRAM(S): 10 TABLET, FILM COATED ORAL at 13:39

## 2023-08-14 RX ADMIN — SIMVASTATIN 40 MILLIGRAM(S): 20 TABLET, FILM COATED ORAL at 22:30

## 2023-08-14 RX ADMIN — HEPARIN SODIUM 8 UNIT(S)/HR: 5000 INJECTION INTRAVENOUS; SUBCUTANEOUS at 12:02

## 2023-08-14 RX ADMIN — PIPERACILLIN AND TAZOBACTAM 25 GRAM(S): 4; .5 INJECTION, POWDER, LYOPHILIZED, FOR SOLUTION INTRAVENOUS at 13:40

## 2023-08-14 RX ADMIN — HEPARIN SODIUM 8.5 UNIT(S)/HR: 5000 INJECTION INTRAVENOUS; SUBCUTANEOUS at 22:46

## 2023-08-14 RX ADMIN — MIDODRINE HYDROCHLORIDE 10 MILLIGRAM(S): 2.5 TABLET ORAL at 05:07

## 2023-08-14 RX ADMIN — MIDODRINE HYDROCHLORIDE 10 MILLIGRAM(S): 2.5 TABLET ORAL at 17:48

## 2023-08-14 RX ADMIN — Medication 1 SUPPOSITORY(S): at 17:48

## 2023-08-14 RX ADMIN — Medication 1000 MILLIGRAM(S): at 22:30

## 2023-08-14 RX ADMIN — Medication 137 MICROGRAM(S): at 05:07

## 2023-08-14 RX ADMIN — Medication 1 SUPPOSITORY(S): at 05:20

## 2023-08-14 RX ADMIN — HEPARIN SODIUM 8 UNIT(S)/HR: 5000 INJECTION INTRAVENOUS; SUBCUTANEOUS at 13:39

## 2023-08-14 RX ADMIN — HEPARIN SODIUM 8 UNIT(S)/HR: 5000 INJECTION INTRAVENOUS; SUBCUTANEOUS at 17:23

## 2023-08-14 NOTE — PROGRESS NOTE ADULT - SUBJECTIVE AND OBJECTIVE BOX
[facility]  Thanh Michaels PA-C  [address] [place] [id number] [phone]    INTERVAL HPI/ OVERNIGHT EVENTS:  pt seen and examined at dialysis   had 2 BMs yesterday, little to no blood  hgb stable     MEDICATIONS  (STANDING):  escitalopram 5 milliGRAM(s) Oral daily  hydrocortisone hemorrhoidal Suppository 1 Suppository(s) Rectal two times a day  levothyroxine 137 MICROGram(s) Oral daily  mesalamine Suppository 1000 milliGRAM(s) Rectal at bedtime  metoprolol succinate  milliGRAM(s) Oral daily  midodrine. 10 milliGRAM(s) Oral three times a day  pantoprazole    Tablet 40 milliGRAM(s) Oral before breakfast  piperacillin/tazobactam IVPB.. 3.375 Gram(s) IV Intermittent every 8 hours  polyethylene glycol 3350 17 Gram(s) Oral two times a day  senna 2 Tablet(s) Oral at bedtime  simvastatin 40 milliGRAM(s) Oral at bedtime    MEDICATIONS  (PRN):  aluminum hydroxide/magnesium hydroxide/simethicone Suspension 30 milliLiter(s) Oral every 6 hours PRN Dyspepsia  midodrine. 10 milliGRAM(s) Oral <User Schedule> PRN PRIOR TO HD  sodium chloride 0.9% Bolus. 100 milliLiter(s) IV Bolus every 5 minutes PRN SBP LESS THAN or EQUAL to 90 mmHg  escitalopram 5 milliGRAM(s) Oral daily  hydrocortisone hemorrhoidal Suppository 1 Suppository(s) Rectal two times a day  levothyroxine 137 MICROGram(s) Oral daily  mesalamine Suppository 1000 milliGRAM(s) Rectal at bedtime  metoprolol succinate  milliGRAM(s) Oral daily  PAST MEDICAL & SURGICAL HISTORY:  Afib  not on anticoagulation      CAD (coronary artery disease)      Varicose vein of leg      Hypothyroid      Pacemaker      H/O fracture of hip        H/O asbestosis      HTN (hypertension)      Pacemaker  Medtronic - Model RVDR01 1/10/2012      Stented coronary artery  drug eluding stent 2007      H/O detached retina repair        S/P cataract surgery      History of hip surgery  left hip ORIF      H/O varicose vein stripping   left leg      History of left knee replacement   - multiple infections post op requiring reoperation in , , 2019)      H/O foot surgery  for infection of right foot 2019      H/O inguinal hernia repair  right       Family history:  No pertinent family history in first degree relatives    FH: skin cancer (Father)        Social History:   no etoh no cigs   no ivda    ROS:     General:  No wt loss, fevers, chills, night sweats, fatigue,   Eyes:  Good vision, no reported pain  ENT:  No sore throat, pain, runny nose, dysphagia  CV:  No pain, palpitations, hypo/hypertension  Resp:  No dyspnea, cough, tachypnea, wheezing  GI:  No pain, No nausea, No vomiting, No diarrhea, No constipation, No weight loss, No fever, No pruritis, No rectal bleeding, No tarry stools, No dysphagia,  :  No pain, bleeding, incontinence, nocturia  Muscle:  No pain, weakness  Neuro:  No weakness, tingling, memory problems  Psych:  No fatigue, insomnia, mood problems, depression  Endocrine:  No polyuria, polydipsia, cold/heat intolerance  Heme:  No petechiae, ecchymosis, easy bruisability  Skin:  No rash, tattoos, scars, edema      PHYSICAL EXAM:   Vital Signs Last 24 Hrs  T(C): 36.6 (14 Aug 2023 09:50), Max: 37.1 (14 Aug 2023 04:33)  T(F): 97.9 (14 Aug 2023 09:50), Max: 98.8 (14 Aug 2023 04:33)  HR: 58 (14 Aug 2023 09:50) (55 - 66)  BP: 106/62 (14 Aug 2023 09:50) (100/61 - 115/58)  BP(mean): --  RR: 18 (14 Aug 2023 09:50) (18 - 18)  SpO2: 97% (14 Aug 2023 09:50) (95% - 97%)    Parameters below as of 14 Aug 2023 09:50  Patient On (Oxygen Delivery Method): nasal cannula  O2 Flow (L/min): 3        Daily Height in cm: 170.18 (11 Aug 2023 00:36)    Daily     GENERAL:  Appears stated age, well-groomed, well-nourished, no distress  HEENT:  NC/AT,  conjunctivae clear and pink, no thyromegaly, nodules, adenopathy, no JVD, sclera -anicteric  CHEST:  Full & symmetric excursion, no increased effort, breath sounds clear  HEART:  Regular rhythm, S1, S2, no murmur/rub/S3/S4, no abdominal bruit, no edema  ABDOMEN:  Soft, non-tender, non-distended, normoactive bowel sounds,  no masses ,no hepato-splenomegaly, no signs of chronic liver disease  EXTEREMITIES:  no cyanosis,clubbing or edema  SKIN:  No rash/erythema/ecchymoses/petechiae/wounds/abscess/warm/dry  NEURO:  Alert, oriented, no asterixis, no tremor, no encephalopathy    LABS:                                   10.8   5.75  )-----------( 146      ( 14 Aug 2023 10:25 )             35.3   08-14    135  |  100  |  34<H>  ----------------------------<  71  3.5   |  21<L>  |  3.46<H>    Ca    9.5      14 Aug 2023 07:30              8-11    LIVER FUNCTIONS - ( 11 Aug 2023 01:23 )  Alb: 3.4 g/dL / Pro: 7.0 g/dL / ALK PHOS: 137 U/L / ALT: 12 U/L / AST: 19 U/L / GGT: x           PT/INR - ( 11 Aug 2023 01:23 )   PT: 23.5 sec;   INR: 2.29 ratio         PTT - ( 11 Aug 2023 01:23 )  PTT:36.4 sec  Urinalysis Basic - ( 11 Aug 2023 06:13 )    Color: Dark Yellow / Appearance: Turbid / S.017 / pH: x  Gluc: x / Ketone: Negative  / Bili: Small / Urobili: 3 mg/dL   Blood: x / Protein: 100 mg/dL / Nitrite: Positive   Leuk Esterase: Large / RBC: 463 /hpf /  /HPF   Sq Epi: x / Non Sq Epi: x / Bacteria: Many      Amylase Serum--      Lipase serum12       Ammonia--      Imaging:    < from: NM GI Bleed Localization (NM GI Bleed Localization .) (23 @ 17:40) >  ACC: [id number] EXAM:  NM GI BLEEDING IMG   ORDERED BY: JAYLENE PARKS     PROCEDURE DATE:  2023          INTERPRETATION:  RADIOPHARMACEUTICAL:  20.0 mCi Tc-99m labeled autologous   red blood cells, I.V.    CLINICAL INFORMATION: GI Bleeding referred for localization of bleeding   site.    TECHNIQUE: Dynamic imaging of the abdomen and pelvis was performed for 2   hours following administration of radiolabeled autologous red blood   cells. Static images of the abdomen and pelvis in the anterior and   lateral views were obtained immediately thereafter. An Anterior view of   the head/neck was obtained for  purposes.    COMPARISON: None.    FINDINGS: There is physiologic distribution of radiolabeled red cells in   the abdomen and pelvis. There is no abnormal focus of increased   radiotracer activity to suggest the presence of an active bleeding site.    IMPRESSION:    No evidence of active bleeding site.     These results were discussed with DASIA Parks NP, by Dr. BARBRA Natarajan via   telephone with read back at about 6:00 PM on 2023.    --- End of Report ---            HAM NATARAJAN MD; Attending Nuclear Medicine  This document has been electronically signed. Aug 11 2023  6:01PM    < end of copied text >

## 2023-08-14 NOTE — PROGRESS NOTE ADULT - ASSESSMENT
78  year old male    h/o  CAD s/p stent , asa.     A-fib/ PPM, , hypothyroid, and total left knee replacement    prior ppm  interrogation  with  WCT   Ct chest, retrosternal hematoma.  mod left pl effusion/ has   c/c leg  pain/  h/o  non complaince  with meds       admitted  with  abd pain and rectal bleeding onset 1 day ago.. arrive s form n home     has  no pain  now    c/c   systolic  and  diastolic  chf,,  cardiomyopathy,. Mitral  valvuloplasty ,  h/o  c/c  l/edema  of L  leg    h/o    c/c AFIB,   has  PPM         eliquis   on  hold  now      h/o c/c   Anemia, , hb is  12.  gi   known to  dr joann INFANTE,     cath, with 2 vessel disease,  s/p  CABG and  MVR  surg d r marni      echo,  on 7/2022,  ef  35/ new/ severe  TR,  and ,  cath,   was non  obstructive     s/p   MSSA  bacterinia , in march 2023, completed  iv ancef..    s/p   explant pf  ppm  and  Micra  placement on  3/30/23. dr sunitha rocha      Echo,  3/2023,  ef  25,  mod  AI. severe  TR . RV hypokinesis    BEULAH, from  AIN, dx  recently , ancef / was  switched   to iv vanco,   CKD, on HD,  has  right Permcath.  hpuse renal   s/p  renal bx on 4/ 14.. 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 + , Ig A  Lamda. ,  known to heme  dr dobson.  pe r heme,  no  need  for  b marrow  bx    cxr/  CT chest,  pl  effusion,  will need  HD  gi    f/p,  no  bleeding  now/  nuclear  scan, no  bleeding   bacteremia   Clostridium cadeveris,  iv zosun now, pe r ID/ /  significance, has  Micra  rpt  bcx  from  8/13,  negative/  await   ID  f/p                   card /  dr mikayla dick       < from: Intra-Operative Transesophageal Echo W or WO Ultrasound Enhancing Agent (03.30.23 @ 12:48) >  --------------------------------------------------------------------------------  PRE-BYPASS FINDINGS  Left Ventricle:  Left ventricular ejection fraction is estimated at 25 to 30%. There is severe kypokinesis in the left ventricle.  Right Ventricle:  Moderately reduced systolic function. Moderately reduced systolic function. There is moderate hypokinesis in the right ventricle.  Left Atrium:  Diffuse smoke visualized in LA.  Aortic Valve:  The aortic valve appears trileaflet. There is moderate aortic regurgitation.  No obvious vegetations visualized  Mitral Valve:  Bioprosthetic valve is present in the mitral position. There is calcification of the mitral valve annulus.  No obvious vegetations visualized.  Tricuspid Valve:  There is moderate-severe tricuspid regurgitation. TR unchanged following lead extraction.  Pulmonic Valve:  There is mild pulmonic regurgitation.  Pericardium:  No pericardial effusion seen. No pericardial effusion visualized before and after lead extraction.  Electronically signed by Jimmy Gambino on 3/30/2023 at 3:00:58 PM  *** Final ***  < end of copied text >

## 2023-08-14 NOTE — PROGRESS NOTE ADULT - SUBJECTIVE AND OBJECTIVE BOX
date of service: 08-14-23 @ 11:41  afebrile  REVIEW OF SYSTEMS:  CONSTITUTIONAL: No fever,  no  weight loss  ENT:  No  tinnitus,   no   vertigo  NECK: No pain or stiffness  RESPIRATORY: No cough, wheezing, chills or hemoptysis;    No Shortness of Breath  CARDIOVASCULAR: No chest pain, palpitations, dizziness  GASTROINTESTINAL: No abdominal or epigastric pain. No nausea, vomiting, or hematemesis; No diarrhea  No melena or hematochezia.  GENITOURINARY: No dysuria, frequency, hematuria, or incontinence  NEUROLOGICAL: No headaches  SKIN: No itching,  no   rash  LYMPH Nodes: No enlarged glands  ENDOCRINE: No heat or cold intolerance  MUSCULOSKELETAL: No joint pain or swelling  PSYCHIATRIC: No depression, anxiety  HEME/LYMPH: No easy bruising, or bleeding gums  ALLERGY AND IMMUNOLOGIC: No hives or eczema	    MEDICATIONS  (STANDING):  escitalopram 5 milliGRAM(s) Oral daily  heparin  Infusion 1200 Unit(s)/Hr (10 mL/Hr) IV Continuous <Continuous>  hydrocortisone hemorrhoidal Suppository 1 Suppository(s) Rectal two times a day  levothyroxine 137 MICROGram(s) Oral daily  mesalamine Suppository 1000 milliGRAM(s) Rectal at bedtime  metoprolol succinate  milliGRAM(s) Oral daily  midodrine. 10 milliGRAM(s) Oral three times a day  pantoprazole    Tablet 40 milliGRAM(s) Oral before breakfast  piperacillin/tazobactam IVPB.. 3.375 Gram(s) IV Intermittent every 8 hours  polyethylene glycol 3350 17 Gram(s) Oral two times a day  senna 2 Tablet(s) Oral at bedtime  simvastatin 40 milliGRAM(s) Oral at bedtime    MEDICATIONS  (PRN):  aluminum hydroxide/magnesium hydroxide/simethicone Suspension 30 milliLiter(s) Oral every 6 hours PRN Dyspepsia  midodrine. 10 milliGRAM(s) Oral <User Schedule> PRN PRIOR TO HD  sodium chloride 0.9% Bolus. 100 milliLiter(s) IV Bolus every 5 minutes PRN SBP LESS THAN or EQUAL to 90 mmHg      Vital Signs Last 24 Hrs  T(C): 36.6 (14 Aug 2023 09:50), Max: 37.1 (14 Aug 2023 04:33)  T(F): 97.9 (14 Aug 2023 09:50), Max: 98.8 (14 Aug 2023 04:33)  HR: 58 (14 Aug 2023 09:50) (55 - 86)  BP: 106/62 (14 Aug 2023 09:50) (100/61 - 115/58)  BP(mean): --  RR: 18 (14 Aug 2023 09:50) (18 - 18)  SpO2: 97% (14 Aug 2023 09:50) (95% - 98%)    Parameters below as of 14 Aug 2023 09:50  Patient On (Oxygen Delivery Method): nasal cannula  O2 Flow (L/min): 3    CAPILLARY BLOOD GLUCOSE        I&O's Summary    13 Aug 2023 07:01  -  14 Aug 2023 07:00  --------------------------------------------------------  IN: 820 mL / OUT: 0 mL / NET: 820 mL          Appearance: Normal	  HEENT:   Normal oral mucosa, PERRL, EOMI	  Lymphatic: No lymphadenopathy  Cardiovascular: Normal S1 S2, No JVD  Respiratory: Lungs clear to auscultation	  Gastrointestinal:  Soft, Non-tender, + BS	  Skin: No rash, No ecchymoses	  Extremities:     LABS:                        10.8   5.75  )-----------( 146      ( 14 Aug 2023 10:25 )             35.3     08-14    135  |  100  |  34<H>  ----------------------------<  71  3.5   |  21<L>  |  3.46<H>    Ca    9.5      14 Aug 2023 07:30      PTT - ( 14 Aug 2023 10:25 )  PTT:92.5 sec      Urinalysis Basic - ( 14 Aug 2023 07:30 )    Color: x / Appearance: x / SG: x / pH: x  Gluc: 71 mg/dL / Ketone: x  / Bili: x / Urobili: x   Blood: x / Protein: x / Nitrite: x   Leuk Esterase: x / RBC: x / WBC x   Sq Epi: x / Non Sq Epi: x / Bacteria: x                      Consultant(s) Notes Reviewed:      Care Discussed with Consultants/Other Providers:

## 2023-08-14 NOTE — PROGRESS NOTE ADULT - ASSESSMENT
79 m with  A-fib, CAD, MS s/p CABG and MVR, BPH, ESRD on HD via R permacath, recent MSSA bacteremia 3/08982 s/p PPM extraction and Micra now p/w rectal bleed and loss of appetite, no fever but stated that has had dysuria for a while I  here afebrile, WBC: 7 Hgb: 12  u/a positive  CT: Abundant fecal material within the rectum with mild wall thickening and trace surrounding stranding. Recommend clinical correlation to assess for stercoral colitis. No bowel obstruction. Cholelithiasis. Findings of pulmonary edema with moderate right and small left pleural   effusion.    rectal bleed, stool impaction and stercoral colitis and clostridium cadaveris bacteremia s/p disimpaction  dysuria and positive u/a, UTI, no fever or WBC, now urine cx with ESBL klebsiella, has h/p MVR, MSSA bacteremia 3/2023 and had negative ERENDIRA, PPM extracted and s/p micra  repeat blood cx 8/13 with GPC in clusters    * repeat blood cx and then a stat dose of vanco  * the urine is ESBL and pt had some dysuria so will switch to ertapenem 500 qd   * monitor CBC/diff   * rectal bleed management as per the primary team    The above assessment and plan was discussed with the primary team    Evelin Grove MD  contact on teams  After 5pm and on weekends call [phone]

## 2023-08-14 NOTE — PHYSICAL THERAPY INITIAL EVALUATION ADULT - ADDITIONAL COMMENTS
Patient admitted from [facility], patient states he was ambulating approximately 25 feet with RW and assist x 2 at [facility], using sliding board for out of bed transfer. Prior to [facility], patient lived alone in apartment, 2 steps to enter, + elevator. Patient ambulates with cane or rolling walker Patient admitted from [facility], patient states he was ambulating approximately 25 feet with RW and assist x 2 at [facility], using sliding board for out of bed transfer. Prior to [facility], patient lived alone in apartment, 2 steps to enter, + elevator. Patient ambulates with cane or rolling walker. 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

## 2023-08-14 NOTE — PROGRESS NOTE ADULT - SUBJECTIVE AND OBJECTIVE BOX
Date of Service: 08-14-23 @ 07:46           CARDIOLOGY     PROGRESS  NOTE   ________________________________________________    CHIEF COMPLAINT:Patient is a 78y old  Male who presents with a chief complaint of abdominal pain/ rectal bleed (13 Aug 2023 11:04)  doing better  	  REVIEW OF SYSTEMS:  CONSTITUTIONAL: No fever, weight loss, or fatigue  EYES: No eye pain, visual disturbances, or discharge  ENT:  No difficulty hearing, tinnitus, vertigo; No sinus or throat pain  NECK: No pain or stiffness  RESPIRATORY: No cough, wheezing, chills or hemoptysis; + decrease  Shortness of Breath  CARDIOVASCULAR: No chest pain, palpitations, passing out, dizziness, or leg swelling  GASTROINTESTINAL: No abdominal or epigastric pain. No nausea, vomiting, or hematemesis; No diarrhea or constipation. No melena or hematochezia.  GENITOURINARY: No dysuria, frequency, hematuria, or incontinence  NEUROLOGICAL: No headaches, memory loss, loss of strength, numbness, or tremors  SKIN: No itching, burning, rashes, or lesions   LYMPH Nodes: No enlarged glands  ENDOCRINE: No heat or cold intolerance; No hair loss  MUSCULOSKELETAL: No joint pain or swelling; No muscle, back, or extremity pain  PSYCHIATRIC: No depression, anxiety, mood swings, or difficulty sleeping  HEME/LYMPH: No easy bruising, or bleeding gums  ALLERGY AND IMMUNOLOGIC: No hives or eczema	    [ ] All others negative	  [ ] Unable to obtain    PHYSICAL EXAM:  T(C): 37.1 (08-14-23 @ 04:33), Max: 37.1 (08-14-23 @ 04:33)  HR: 66 (08-14-23 @ 04:33) (55 - 86)  BP: 100/61 (08-14-23 @ 04:33) (100/61 - 115/58)  RR: 18 (08-14-23 @ 04:33) (18 - 18)  SpO2: 95% (08-14-23 @ 04:33) (95% - 98%)  Wt(kg): --  I&O's Summary    13 Aug 2023 07:01  -  14 Aug 2023 07:00  --------------------------------------------------------  IN: 820 mL / OUT: 0 mL / NET: 820 mL        Appearance: Normal	  HEENT:   Normal oral mucosa, PERRL, EOMI	  Lymphatic: No lymphadenopathy  Cardiovascular: Normal S1 S2, No JVD, + murmurs, + trace edema  Respiratory: Lungs clear to auscultation	  Psychiatry: A & O x 3, Mood & affect appropriate  Gastrointestinal:  Soft, Non-tender, + BS	  Skin: No rashes, No ecchymoses, No cyanosis	  Neurologic: Non-focal  Extremities: Normal range of motion, No clubbing, cyanosis , + ttrace edema  Vascular: Peripheral pulses palpable 2+ bilaterally    MEDICATIONS  (STANDING):  escitalopram 5 milliGRAM(s) Oral daily  heparin  Infusion 1200 Unit(s)/Hr (10 mL/Hr) IV Continuous <Continuous>  hydrocortisone hemorrhoidal Suppository 1 Suppository(s) Rectal two times a day  levothyroxine 137 MICROGram(s) Oral daily  mesalamine Suppository 1000 milliGRAM(s) Rectal at bedtime  metoprolol succinate  milliGRAM(s) Oral daily  midodrine. 10 milliGRAM(s) Oral three times a day  pantoprazole    Tablet 40 milliGRAM(s) Oral before breakfast  piperacillin/tazobactam IVPB.. 3.375 Gram(s) IV Intermittent every 8 hours  polyethylene glycol 3350 17 Gram(s) Oral two times a day  senna 2 Tablet(s) Oral at bedtime  simvastatin 40 milliGRAM(s) Oral at bedtime      TELEMETRY: 	    ECG:  	  RADIOLOGY:  OTHER: 	  	  LABS:	 	    CARDIAC MARKERS:                                11.7   7.37  )-----------( 155      ( 13 Aug 2023 21:02 )             38.2     08-13    137  |  100  |  29<H>  ----------------------------<  90  4.1   |  27  |  2.78<H>    Ca    10.2      13 Aug 2023 06:43      proBNP:   Lipid Profile:   HgA1c:   TSH:   PTT - ( 14 Aug 2023 03:25 )  PTT:92.5 sec  #Clostridium cadaveris bacteremia  #Stercoral colitis  #Constipation  #UTI  #Elevated serum creatinine  - Would repeat 2 sets of BCx  - Continue with Zosyn.  - Consider TTE    Culture - Urine (08.11.23 @ 06:13)    Specimen Source: Clean Catch Clean Catch (Midstream)   Culture Results:   >100,000 CFU/ml Gram Negative Rods    Culture - Blood (08.11.23 @ 00:55)    Gram Stain:   Growth in anaerobic bottle: Gram Variable Rods   -  Blood PCR Panel: NEG   Specimen Source: .Blood Blood-Venous   Organism: Blood Culture PCR   Culture Results:   Growth in anaerobic bottle: Clostridium cadaveris "Susceptibilities not  performed"  Direct identification is available within approximately 3-5  hours either by Blood Panel Multiplexed PCR or Direct  MALDI-TOF. Details: [email or web address]   Organism Identification: Blood Culture PCR   Method Type: PCR    Assessment and plan  ---------------------------  Liam Garza is 79 y.o. M with PMHx Significant for HLD, A-fib, BPH, ESRD, CANDY, who presents to the ED with c.o. abd pain, rectal pain, and bloody stools.  Per patient, he has been experiencing abd pain and rectal bleeding onset 1 day ago.  Patient notes pain is localized to the suprapubic area, radiates to the RLQ.  He notes pain is intermittent and has had minimal improvement with Tylenol.  Nothing seems to exacerbate his pain.  His only other associated symptom is chills.  Otherwise he denies fevers, N/V, HA, changes in his vision, sore throat, SOB, CP, diarrhea or constipation. Blood per rectum is accumulated in his depends. Of note, patient notes rectal pain began in May which she describes as sharp and has been constant since onset.  pt with hx of chronic a.fib, chf systolic EF 35 to 40% s/p ppm ,MICRA sec to ppm exrtacction sec to + BC.  sob sec acute on chronic chf systolc, fluid overload  renal eval possible HD today  ?rectal bleed hold AC, GI eval  ASHD / A. FIb continue beta blocker   repeat echo with hx of pericardial effusion  clear liquid diet  ct ?colitis/ + ua start on abx/ ID noted, awaiting culture  + BC ID follow up discussed with ACP  GI/ MED appreciated  pulmonary edema/ chf, repeat echo check MV, continue HD with increase fluid withdrawal as tolerated  abx switches do Zosyn, ID appreciated  awaiting TTE ?today  will add ace if bp can tolerate  started on iv heparin yesterday, fu ghb, no active bleeding noted  advance diet as  tolerated  doing better, oob to chair/ physical therapy

## 2023-08-14 NOTE — PROGRESS NOTE ADULT - SUBJECTIVE AND OBJECTIVE BOX
[facility] DIVISION OF KIDNEY DISEASES AND HYPERTENSION   FOLLOW UP NOTE    --------------------------------------------------------------------------------    SUBJECTIVE / ROS / INTERVAL EVENTS: seen and examined. no complaints. sob improved. HD today-  pt unable to tolerate UF due to hypotension so no UF was done.        PAST HISTORY  --------------------------------------------------------------------------------  No significant changes to PMH, PSH, FHx, SHx, unless otherwise noted    ALLERGIES & MEDICATIONS  --------------------------------------------------------------------------------  Allergies    Myrbetriq (Hives)  tamsulosin (Hives)  alfuzosin (Hives)  levofloxacin (Hives)    Intolerances      Standing Inpatient Medications  escitalopram 5 milliGRAM(s) Oral daily  heparin  Infusion 1200 Unit(s)/Hr IV Continuous <Continuous>  hydrocortisone hemorrhoidal Suppository 1 Suppository(s) Rectal two times a day  levothyroxine 137 MICROGram(s) Oral daily  mesalamine Suppository 1000 milliGRAM(s) Rectal at bedtime  metoprolol succinate  milliGRAM(s) Oral daily  midodrine. 10 milliGRAM(s) Oral three times a day  pantoprazole    Tablet 40 milliGRAM(s) Oral before breakfast  piperacillin/tazobactam IVPB.. 3.375 Gram(s) IV Intermittent every 8 hours  polyethylene glycol 3350 17 Gram(s) Oral two times a day  senna 2 Tablet(s) Oral at bedtime  simvastatin 40 milliGRAM(s) Oral at bedtime    PRN Inpatient Medications  aluminum hydroxide/magnesium hydroxide/simethicone Suspension 30 milliLiter(s) Oral every 6 hours PRN  midodrine. 10 milliGRAM(s) Oral <User Schedule> PRN  sodium chloride 0.9% Bolus. 100 milliLiter(s) IV Bolus every 5 minutes PRN      VITALS/PHYSICAL EXAM  --------------------------------------------------------------------------------  T(C): 36.6 (08-14-23 @ 09:50), Max: 37.1 (08-14-23 @ 04:33)  HR: 58 (08-14-23 @ 09:50) (55 - 66)  BP: 106/62 (08-14-23 @ 09:50) (100/61 - 115/58)  RR: 18 (08-14-23 @ 09:50) (18 - 18)  SpO2: 97% (08-14-23 @ 09:50) (95% - 97%)  Wt(kg): --      08-13-23 @ 07:01  -  08-14-23 @ 07:00  --------------------------------------------------------  IN: 820 mL / OUT: 0 mL / NET: 820 mL      General: NAD  Neuro: No focal deficits  HEENT: Atraumatic  Pulmonary: Bibasilar rales  Cardiovascular/Chest: +S1S2, RRR  GI/Abdomen: Soft, non-distended, non-tender, +bowel sounds  Extremities: No LE pitting edema B/L  Skin: Warm and dry  Dialysis access: R tunneled cath    LABS/STUDIES  --------------------------------------------------------------------------------              10.8   5.75  >-----------<  146      [08-14-23 @ 10:25]              35.3     135  |  100  |  34  ----------------------------<  71      [08-14-23 @ 07:30]  3.5   |  21  |  3.46        Ca     9.5     [08-14-23 @ 07:30]        PTT: 92.5       [08-14-23 @ 10:25]      Creatinine Trend:  SCr 3.46 [08-14 @ 07:30]  SCr 2.78 [08-13 @ 06:43]  SCr 2.36 [08-11 @ 21:12]  SCr 2.33 [08-11 @ 01:23]    Urinalysis - [08-14-23 @ 07:30]      Color  / Appearance  / SG  / pH       Gluc 71 / Ketone   / Bili  / Urobili        Blood  / Protein  / Leuk Est  / Nitrite       RBC  / WBC  / Hyaline  / Gran  / Sq Epi  / Non Sq Epi  / Bacteria       HBsAb <3.0      [08-11-23 @ 21:13]  HBsAg Nonreact      [08-11-23 @ 21:13]  HBcAb Nonreact      [08-11-23 @ 21:13]

## 2023-08-14 NOTE — PROGRESS NOTE ADULT - ASSESSMENT
a/p colitis  constipation    plan  ppi once a day  bowel regimen   add lactulose 10cc q 6 hours and glycerin suppository  monitor stool output  surgery following  Disimpacted last night   NM GI Bleeding scan noted, no evidence of active bleeding  Hgb stable  started on hep gtt; monitor hgb  cont mesalamine supp  will follow   d/w pt    I reviewed the overnight course of events on the unit, re-confirming the patient history. I discussed the care with the patient and their family  The plan of care was discussed with the physician assistant and modifications were made to the notation where appropriate.   Differential diagnosis and plan of care discussed with patient after the evaluation  50 minutes spent on total encounter of which more than fifty percent of the encounter was spent counseling and/or coordinating care by the attending physician.  Advanced care planning was discussed with patient and family.  Advanced care planning forms were reviewed and discussed.  Risks, benefits and alternatives of gastroenterologic procedures were discussed in detail and all questions were answered.       Advanced care planning was discussed with patient and family.  Advanced care planning forms were reviewed and discussed.  Risks, benefits and alternatives of gastroenterologic procedures were discussed in detail and all questions were answered.    30 minutes spent.

## 2023-08-14 NOTE — PROGRESS NOTE ADULT - SUBJECTIVE AND OBJECTIVE BOX
Follow Up:  bacteremia    Interval History/ROS: pt afebrile s/p disimpaction, no vomiting or SOB, repeat blood cx with GPC           Allergies  Myrbetriq (Hives)  tamsulosin (Hives)  alfuzosin (Hives)  levofloxacin (Hives)        ANTIMICROBIALS:  piperacillin/tazobactam IVPB.. 3.375 every 8 hours  vancomycin  IVPB 1000 once      OTHER MEDS:  aluminum hydroxide/magnesium hydroxide/simethicone Suspension 30 milliLiter(s) Oral every 6 hours PRN  escitalopram 5 milliGRAM(s) Oral daily  heparin  Infusion 1200 Unit(s)/Hr IV Continuous <Continuous>  hydrocortisone hemorrhoidal Suppository 1 Suppository(s) Rectal two times a day  levothyroxine 137 MICROGram(s) Oral daily  mesalamine Suppository 1000 milliGRAM(s) Rectal at bedtime  metoprolol succinate  milliGRAM(s) Oral daily  midodrine. 20 milliGRAM(s) Oral <User Schedule> PRN  midodrine. 10 milliGRAM(s) Oral three times a day  pantoprazole    Tablet 40 milliGRAM(s) Oral before breakfast  polyethylene glycol 3350 17 Gram(s) Oral two times a day  senna 2 Tablet(s) Oral at bedtime  simvastatin 40 milliGRAM(s) Oral at bedtime  sodium chloride 0.9% Bolus. 100 milliLiter(s) IV Bolus every 5 minutes PRN      Vital Signs Last 24 Hrs  T(C): 36.4 (14 Aug 2023 16:15), Max: 37.1 (14 Aug 2023 04:33)  T(F): 97.6 (14 Aug 2023 16:15), Max: 98.8 (14 Aug 2023 04:33)  HR: 66 (14 Aug 2023 16:15) (52 - 78)  BP: 111/61 (14 Aug 2023 16:15) (95/51 - 115/58)  BP(mean): --  RR: 18 (14 Aug 2023 16:15) (18 - 18)  SpO2: 94% (14 Aug 2023 16:15) (94% - 98%)    Parameters below as of 14 Aug 2023 16:15  Patient On (Oxygen Delivery Method): nasal cannula  O2 Flow (L/min): 3      Physical Exam:  General:    NAD, non toxic  Respiratory:   clear b/l  abd:   soft,, not tender  :     no CVAT, no suprapubic tenderness, no delgadillo  Musculoskeletal : no joint swelling, no edema  Skin:   petechial rash on torose and UE  vascular: R permacath  Neurologic:     no focal deficits  psych: normal affect                        10.8   5.75  )-----------( 146      ( 14 Aug 2023 10:25 )             35.3       08-14    135  |  100  |  34<H>  ----------------------------<  71  3.5   |  21<L>  |  3.46<H>    Ca    9.5      14 Aug 2023 07:30        Urinalysis Basic - ( 14 Aug 2023 07:30 )    Color: x / Appearance: x / SG: x / pH: x  Gluc: 71 mg/dL / Ketone: x  / Bili: x / Urobili: x   Blood: x / Protein: x / Nitrite: x   Leuk Esterase: x / RBC: x / WBC x   Sq Epi: x / Non Sq Epi: x / Bacteria: x        MICROBIOLOGY:  v  .Blood Blood  08-13-23   Growth in aerobic bottle: Gram Positive Cocci in Clusters  Growth in anaerobic bottle: Gram Positive Cocci in Clusters  Direct identification is available within approximately 3-5  hours either by Blood Panel Multiplexed PCR or Direct  MALDI-TOF. Details: [email or web address]  --  Blood Culture PCR      Clean Catch Clean Catch (Midstream)  08-11-23   >100,000 CFU/ml Klebsiella pneumoniae ESBL  --  Klebsiella pneumoniae ESBL      .Blood Blood-Venous  08-11-23   Growth in anaerobic bottle: Clostridium cadaveris "Susceptibilities not  performed"  Direct identification is available within approximately 3-5  hours either by Blood Panel Multiplexed PCR or Direct  MALDI-TOF. Details: [email or web address]  --  Blood Culture PCR      .Blood Blood-Venous  08-11-23   No growth at 72 Hours  --  --          Rapid RVP Result: Teresetezoey (08-11 @ 01:27)        RADIOLOGY:  Images independently visualized and reviewed personally, findings as below  < from: NM GI Bleed Localization (NM GI Bleed Localization .) (08.11.23 @ 17:40) >  IMPRESSION:    No evidence of active bleeding site.      < end of copied text >  < from: CT Abdomen and Pelvis w/ IV Cont (08.11.23 @ 04:32) >    IMPRESSION:    Abundant fecal material within the rectum with mild wall thickening and   trace surrounding stranding. Recommend clinical correlation to assess for   stercoral colitis. No bowel obstruction.    Cholelithiasis.    Findings of pulmonary edema with moderate right and small left pleural   effusion.    Additional findings mentioned above.    < end of copied text >

## 2023-08-15 LAB
-  BLOOD PCR PANEL: SIGNIFICANT CHANGE UP
CULTURE RESULTS: SIGNIFICANT CHANGE UP
GRAM STN FLD: SIGNIFICANT CHANGE UP
METHOD TYPE: SIGNIFICANT CHANGE UP
ORGANISM # SPEC MICROSCOPIC CNT: SIGNIFICANT CHANGE UP
ORGANISM # SPEC MICROSCOPIC CNT: SIGNIFICANT CHANGE UP
SPECIMEN SOURCE: SIGNIFICANT CHANGE UP

## 2023-08-15 PROCEDURE — 99232 SBSQ HOSP IP/OBS MODERATE 35: CPT | Mod: GC

## 2023-08-15 PROCEDURE — 99232 SBSQ HOSP IP/OBS MODERATE 35: CPT

## 2023-08-15 RX ADMIN — ESCITALOPRAM OXALATE 5 MILLIGRAM(S): 10 TABLET, FILM COATED ORAL at 12:35

## 2023-08-15 RX ADMIN — Medication 1000 MILLIGRAM(S): at 22:35

## 2023-08-15 RX ADMIN — ERTAPENEM SODIUM 100 MILLIGRAM(S): 1 INJECTION, POWDER, LYOPHILIZED, FOR SOLUTION INTRAMUSCULAR; INTRAVENOUS at 23:48

## 2023-08-15 RX ADMIN — PANTOPRAZOLE SODIUM 40 MILLIGRAM(S): 20 TABLET, DELAYED RELEASE ORAL at 06:38

## 2023-08-15 RX ADMIN — MIDODRINE HYDROCHLORIDE 10 MILLIGRAM(S): 2.5 TABLET ORAL at 06:37

## 2023-08-15 RX ADMIN — Medication 250 MILLIGRAM(S): at 00:00

## 2023-08-15 RX ADMIN — MIDODRINE HYDROCHLORIDE 10 MILLIGRAM(S): 2.5 TABLET ORAL at 17:20

## 2023-08-15 RX ADMIN — POLYETHYLENE GLYCOL 3350 17 GRAM(S): 17 POWDER, FOR SOLUTION ORAL at 06:37

## 2023-08-15 RX ADMIN — Medication 100 MILLIGRAM(S): at 06:37

## 2023-08-15 RX ADMIN — MIDODRINE HYDROCHLORIDE 10 MILLIGRAM(S): 2.5 TABLET ORAL at 12:35

## 2023-08-15 RX ADMIN — SIMVASTATIN 40 MILLIGRAM(S): 20 TABLET, FILM COATED ORAL at 22:34

## 2023-08-15 RX ADMIN — Medication 137 MICROGRAM(S): at 06:37

## 2023-08-15 RX ADMIN — HEPARIN SODIUM 8.5 UNIT(S)/HR: 5000 INJECTION INTRAVENOUS; SUBCUTANEOUS at 07:20

## 2023-08-15 NOTE — PROVIDER CONTACT NOTE (CRITICAL VALUE NOTIFICATION) - SITUATION
Blood cultures from the 11th
Pt AOX3. No acute distress.
see above
Blood culture results from [facility]

## 2023-08-15 NOTE — CHART NOTE - NSCHARTNOTEFT_GEN_A_CORE
MEDICINE NP    JANEE ARCEO  78y Male    Patient is a 78y old  Male who presents with a chief complaint of abdominal pain/ rectal bleed (14 Aug 2023 18:36)         > Event Summary:   Notified by RN, Patient with critical lab value, BCX 8/13 GPCC in anaerobic and aerobic bottles  -PCR w/ Staphylococcus epidermidis, Methicillin resistant: Detec  -ID Following, Patient due for Vancomycin 1G IV x1 and rpt BCX  -Remains afebrile and stable VS  -C/w trend WBC and Fever curve  -Will endorse to Day Provider in AM and Attending to follow      Culture - Blood in AM (08.13.23 @ 06:45)    -  Staphylococcus epidermidis, Methicillin resistant: Detec    Gram Stain:   Growth in aerobic bottle: Gram Positive Cocci in Clusters  Growth in anaerobic bottle: Gram Positive Cocci in Clusters    Specimen Source: .Blood Blood    Organism: Blood Culture PCR        -Vital Signs Last 24 Hrs  T(C): 36.9 (15 Aug 2023 00:29), Max: 36.9 (15 Aug 2023 00:29)  T(F): 98.5 (15 Aug 2023 00:29), Max: 98.5 (15 Aug 2023 00:29)  HR: 68 (15 Aug 2023 00:29) (52 - 81)  BP: 126/63 (15 Aug 2023 00:29) (95/51 - 126/63)  RR: 18 (15 Aug 2023 00:29) (18 - 18)  SpO2: 96% (15 Aug 2023 00:29) (94% - 98%)    Parameters below as of 15 Aug 2023 00:29  Patient On (Oxygen Delivery Method): nasal cannula  O2 Flow (L/min): 3      OCHOA Ray-BC  Medicine Department MEDICINE NP    JANEE ARCEO  78y Male    Patient is a 78y old  Male who presents with a chief complaint of abdominal pain/ rectal bleed (14 Aug 2023 18:36)         > Event Summary:   Notified by RN, Patient with critical lab value, BCX 8/13 GPCC in anaerobic and aerobic bottles  -PCR w/ Staphylococcus epidermidis, Methicillin resistant: Detec  -C/w Invanz   -ID Following, Patient due for Vancomycin 1G IV x1 and rpt BCX x2   -Remains afebrile and stable VS  -C/w trend WBC and Fever curve  -ECHO  -Pending   -Will endorse to Day Provider in AM and Attending to follow      Culture - Blood in AM (08.13.23 @ 06:45)    -  Staphylococcus epidermidis, Methicillin resistant: Detec    Gram Stain:   Growth in aerobic bottle: Gram Positive Cocci in Clusters  Growth in anaerobic bottle: Gram Positive Cocci in Clusters    Specimen Source: .Blood Blood    Organism: Blood Culture PCR        -Vital Signs Last 24 Hrs  T(C): 36.9 (15 Aug 2023 00:29), Max: 36.9 (15 Aug 2023 00:29)  T(F): 98.5 (15 Aug 2023 00:29), Max: 98.5 (15 Aug 2023 00:29)  HR: 68 (15 Aug 2023 00:29) (52 - 81)  BP: 126/63 (15 Aug 2023 00:29) (95/51 - 126/63)  RR: 18 (15 Aug 2023 00:29) (18 - 18)  SpO2: 96% (15 Aug 2023 00:29) (94% - 98%)    Parameters below as of 15 Aug 2023 00:29  Patient On (Oxygen Delivery Method): nasal cannula  O2 Flow (L/min): 3      OCHOA Ray-BC  Medicine Department

## 2023-08-15 NOTE — PROVIDER CONTACT NOTE (CRITICAL VALUE NOTIFICATION) - BACKGROUND
Admitted for GI bleed
79 y.o. M with PMHx Significant for HLD, A-fib, BPH, ESRD (new HD since May 2023), CANDY, who presents to the ED with c.o. abd pain, rectal pain, and bloody stools.
79 y.o. M with PMHx Significant for HLD, A-fib, BPH, ESRD (new HD since May 2023), CANDY, who presents to the ED with c.o. abd pain, rectal pain, and bloody stools.

## 2023-08-15 NOTE — PROGRESS NOTE ADULT - SUBJECTIVE AND OBJECTIVE BOX
[facility]  Thanh Michaels PA-C  [address] [place] [id number] [phone]    INTERVAL HPI/ OVERNIGHT EVENTS:  pt seen and examined, no new events  no abdominal pain   +BM, green and non bloody     MEDICATIONS  (STANDING):  escitalopram 5 milliGRAM(s) Oral daily  hydrocortisone hemorrhoidal Suppository 1 Suppository(s) Rectal two times a day  levothyroxine 137 MICROGram(s) Oral daily  mesalamine Suppository 1000 milliGRAM(s) Rectal at bedtime  metoprolol succinate  milliGRAM(s) Oral daily  midodrine. 10 milliGRAM(s) Oral three times a day  pantoprazole    Tablet 40 milliGRAM(s) Oral before breakfast  piperacillin/tazobactam IVPB.. 3.375 Gram(s) IV Intermittent every 8 hours  polyethylene glycol 3350 17 Gram(s) Oral two times a day  senna 2 Tablet(s) Oral at bedtime  simvastatin 40 milliGRAM(s) Oral at bedtime    MEDICATIONS  (PRN):  aluminum hydroxide/magnesium hydroxide/simethicone Suspension 30 milliLiter(s) Oral every 6 hours PRN Dyspepsia  midodrine. 10 milliGRAM(s) Oral <User Schedule> PRN PRIOR TO HD  sodium chloride 0.9% Bolus. 100 milliLiter(s) IV Bolus every 5 minutes PRN SBP LESS THAN or EQUAL to 90 mmHg  escitalopram 5 milliGRAM(s) Oral daily  hydrocortisone hemorrhoidal Suppository 1 Suppository(s) Rectal two times a day  levothyroxine 137 MICROGram(s) Oral daily  mesalamine Suppository 1000 milliGRAM(s) Rectal at bedtime  metoprolol succinate  milliGRAM(s) Oral daily  PAST MEDICAL & SURGICAL HISTORY:  Afib  not on anticoagulation      CAD (coronary artery disease)      Varicose vein of leg      Hypothyroid      Pacemaker      H/O fracture of hip        H/O asbestosis      HTN (hypertension)      Pacemaker  [employer]tronic - Model RVDR01 1/10/2012      Stented coronary artery  drug eluding stent 2007      H/O detached retina repair        S/P cataract surgery      History of hip surgery  left hip ORIF      H/O varicose vein stripping   left leg      History of left knee replacement   - multiple infections post op requiring reoperation in , , )      H/O foot surgery  for infection of right foot 2019      H/O inguinal hernia repair  right       Family history:  No pertinent family history in first degree relatives    FH: skin cancer (Father)        Social History:   no etoh no cigs   no ivda    ROS:     General:  No wt loss, fevers, chills, night sweats, fatigue,   Eyes:  Good vision, no reported pain  ENT:  No sore throat, pain, runny nose, dysphagia  CV:  No pain, palpitations, hypo/hypertension  Resp:  No dyspnea, cough, tachypnea, wheezing  GI:  No pain, No nausea, No vomiting, No diarrhea, No constipation, No weight loss, No fever, No pruritis, No rectal bleeding, No tarry stools, No dysphagia,  :  No pain, bleeding, incontinence, nocturia  Muscle:  No pain, weakness  Neuro:  No weakness, tingling, memory problems  Psych:  No fatigue, insomnia, mood problems, depression  Endocrine:  No polyuria, polydipsia, cold/heat intolerance  Heme:  No petechiae, ecchymosis, easy bruisability  Skin:  No rash, tattoos, scars, edema      PHYSICAL EXAM:   Vital Signs Last 24 Hrs  T(C): 36.4 (15 Aug 2023 08:53), Max: 36.9 (15 Aug 2023 00:29)  T(F): 97.6 (15 Aug 2023 08:53), Max: 98.5 (15 Aug 2023 00:29)  HR: 66 (15 Aug 2023 08:53) (52 - 81)  BP: 130/65 (15 Aug 2023 08:53) (95/51 - 145/78)  BP(mean): --  RR: 18 (15 Aug 2023 08:53) (17 - 18)  SpO2: 96% (15 Aug 2023 08:53) (94% - 98%)    Parameters below as of 15 Aug 2023 08:53  Patient On (Oxygen Delivery Method): nasal cannula      Daily Height in cm: 170.18 (11 Aug 2023 00:36)    Daily     GENERAL:  Appears stated age, well-groomed, well-nourished, no distress  HEENT:  NC/AT,  conjunctivae clear and pink, no thyromegaly, nodules, adenopathy, no JVD, sclera -anicteric  CHEST:  Full & symmetric excursion, no increased effort, breath sounds clear  HEART:  Regular rhythm, S1, S2, no murmur/rub/S3/S4, no abdominal bruit, no edema  ABDOMEN:  Soft, non-tender, non-distended, normoactive bowel sounds,  no masses ,no hepato-splenomegaly, no signs of chronic liver disease  EXTEREMITIES:  no cyanosis,clubbing or edema  SKIN:  No rash/erythema/ecchymoses/petechiae/wounds/abscess/warm/dry  NEURO:  Alert, oriented, no asterixis, no tremor, no encephalopathy    LABS:                                   10.8   5.75  )-----------( 146      ( 14 Aug 2023 10:25 )             35.3   08-14    135  |  100  |  34<H>  ----------------------------<  71  3.5   |  21<L>  |  3.46<H>    Ca    9.5      14 Aug 2023 07:30          8-11    LIVER FUNCTIONS - ( 11 Aug 2023 01:23 )  Alb: 3.4 g/dL / Pro: 7.0 g/dL / ALK PHOS: 137 U/L / ALT: 12 U/L / AST: 19 U/L / GGT: x           PT/INR - ( 11 Aug 2023 01:23 )   PT: 23.5 sec;   INR: 2.29 ratio         PTT - ( 11 Aug 2023 01:23 )  PTT:36.4 sec  Urinalysis Basic - ( 11 Aug 2023 06:13 )    Color: Dark Yellow / Appearance: Turbid / S.017 / pH: x  Gluc: x / Ketone: Negative  / Bili: Small / Urobili: 3 mg/dL   Blood: x / Protein: 100 mg/dL / Nitrite: Positive   Leuk Esterase: Large / RBC: 463 /hpf /  /HPF   Sq Epi: x / Non Sq Epi: x / Bacteria: Many      Amylase Serum--      Lipase serum12       Ammonia--      Imaging:    < from: NM GI Bleed Localization (NM GI Bleed Localization .) (23 @ 17:40) >  ACC: [id number] EXAM:  NM GI BLEEDING IMG   ORDERED BY: JAYLENE PARKS     PROCEDURE DATE:  2023          INTERPRETATION:  RADIOPHARMACEUTICAL:  20.0 mCi Tc-99m labeled autologous   red blood cells, I.V.    CLINICAL INFORMATION: GI Bleeding referred for localization of bleeding   site.    TECHNIQUE: Dynamic imaging of the abdomen and pelvis was performed for 2   hours following administration of radiolabeled autologous red blood   cells. Static images of the abdomen and pelvis in the anterior and   lateral views were obtained immediately thereafter. An Anterior view of   the head/neck was obtained for  purposes.    COMPARISON: None.    FINDINGS: There is physiologic distribution of radiolabeled red cells in   the abdomen and pelvis. There is no abnormal focus of increased   radiotracer activity to suggest the presence of an active bleeding site.    IMPRESSION:    No evidence of active bleeding site.     These results were discussed with DASIA Parks NP, by Dr. BARBRA Natarajan via   telephone with read back at about 6:00 PM on 2023.    --- End of Report ---            HAM NATARAJAN MD; Attending Nuclear Medicine  This document has been electronically signed. Aug 11 2023  6:01PM    < end of copied text >

## 2023-08-15 NOTE — PROGRESS NOTE ADULT - ASSESSMENT
78  year old male    h/o  CAD s/p stent , asa.     A-fib/ PPM, , hypothyroid, and total left knee replacement    prior ppm  interrogation  with  WCT   Ct chest, retrosternal hematoma.  mod left pl effusion/ has   c/c leg  pain/  h/o  non complaince  with meds       admitted  with  abd pain and rectal bleeding onset 1 day ago.. arrive s form n home     has  no pain  now    c/c   systolic  and  diastolic  chf,,  cardiomyopathy,. Mitral  valvuloplasty ,  h/o  c/c  l/edema  of L  leg    h/o    c/c AFIB,   has  PPM         eliquis   on  hold  now      h/o c/c   Anemia, , hb is  12.  gi   known to  dr joann INFANTE,     cath, with 2 vessel disease,  s/p  CABG and  MVR  surg d r marni      echo,  on 7/2022,  ef  35/ new/ severe  TR,  and ,  cath,   was non  obstructive     s/p   MSSA  bacterinia , in march 2023, completed  iv ancef..    s/p   explant pf  ppm  and  Micra  placement on  3/30/23. dr sunitha rocha      Echo,  3/2023,  ef  25,  mod  AI. severe  TR . RV hypokinesis    BEULAH, from  AIN, dx  recently , ancef / was  switched   to iv vanco,   CKD, on HD,  has  right Permcath.  hpuse renal   s/p  renal bx on 4/ 14.. 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 + , Ig A  Lamda. ,  known to heme  dr dobson.  pe r heme,  no  need  for  b marrow  bx    cxr/  CT chest,  pl  effusion,  will need  HD  gi    f/p,  no  bleeding  now/  nuclear  scan, no  bleeding   bacteremia   Clostridium cadeveris,  iv   ertapenem   rpt  bcx  from  8/13,  GPC/  Staph  epi/  need  discussion.  maybe  cannot  be  dismissed  as  a contamianant,  given  priro   history  of bacteremia   hasMicra                   card /  dr mikayla dick       < from: Intra-Operative Transesophageal Echo W or WO Ultrasound Enhancing Agent (03.30.23 @ 12:48) >  --------------------------------------------------------------------------------  PRE-BYPASS FINDINGS  Left Ventricle:  Left ventricular ejection fraction is estimated at 25 to 30%. There is severe kypokinesis in the left ventricle.  Right Ventricle:  Moderately reduced systolic function. Moderately reduced systolic function. There is moderate hypokinesis in the right ventricle.  Left Atrium:  Diffuse smoke visualized in LA.  Aortic Valve:  The aortic valve appears trileaflet. There is moderate aortic regurgitation.  No obvious vegetations visualized  Mitral Valve:  Bioprosthetic valve is present in the mitral position. There is calcification of the mitral valve annulus.  No obvious vegetations visualized.  Tricuspid Valve:  There is moderate-severe tricuspid regurgitation. TR unchanged following lead extraction.  Pulmonic Valve:  There is mild pulmonic regurgitation.  Pericardium:  No pericardial effusion seen. No pericardial effusion visualized before and after lead extraction.  Electronically signed by Jimmy Gambino on 3/30/2023 at 3:00:58 PM  *** Final ***  < end of copied text >

## 2023-08-15 NOTE — PROVIDER CONTACT NOTE (CRITICAL VALUE NOTIFICATION) - TEST AND RESULT REPORTED:
Blood culture from 13th growth in anaerobic and aerobic bottles: gram positive cocci in clusters
Blood culture from 08/13/23, Growth Aerobic
Blood cultures 8/11/23 Growth in anaerobic bottle gram variable rods
August 11 blood cultures: Anaerobic gram positive rods

## 2023-08-15 NOTE — PROGRESS NOTE ADULT - ASSESSMENT
a/p colitis  constipation    plan  ppi once a day  cont bowel regimen   monitor stool output  disimpacted by surgery   NM GI Bleeding scan noted, no evidence of active bleeding  Hgb stable  started on hep gtt; monitor hgb  cont mesalamine supp  will follow   d/w pt    I reviewed the overnight course of events on the unit, re-confirming the patient history. I discussed the care with the patient and their family  The plan of care was discussed with the physician assistant and modifications were made to the notation where appropriate.   Differential diagnosis and plan of care discussed with patient after the evaluation  50 minutes spent on total encounter of which more than fifty percent of the encounter was spent counseling and/or coordinating care by the attending physician.  Advanced care planning was discussed with patient and family.  Advanced care planning forms were reviewed and discussed.  Risks, benefits and alternatives of gastroenterologic procedures were discussed in detail and all questions were answered.       Advanced care planning was discussed with patient and family.  Advanced care planning forms were reviewed and discussed.  Risks, benefits and alternatives of gastroenterologic procedures were discussed in detail and all questions were answered.    30 minutes spent.

## 2023-08-15 NOTE — PROGRESS NOTE ADULT - ASSESSMENT
79 m with  A-fib, CAD, MS s/p CABG and MVR, BPH, ESRD on HD via R permacath, recent MSSA bacteremia 3/[id number] s/p PPM extraction and Micra now p/w rectal bleed and loss of appetite, no fever but stated that has had dysuria for a while I  here afebrile, WBC: 7 Hgb: 12  u/a positive  CT: Abundant fecal material within the rectum with mild wall thickening and trace surrounding stranding. Recommend clinical correlation to assess for stercoral colitis. No bowel obstruction. Cholelithiasis. Findings of pulmonary edema with moderate right and small left pleural   effusion.    rectal bleed, stool impaction and stercoral colitis and clostridium cadaveris bacteremia s/p disimpaction  dysuria and positive u/a, UTI, no fever or WBC, now urine cx with ESBL klebsiella, has h/p MVR, MSSA bacteremia 3/2023 and had negative ERENDIRA, PPM extracted and s/p micra  repeat blood cx 8/13 with staph epi which is likely a contaminant as pt is afebrile and doing well    * f/u the repeat blood cx but no more vanco  * the urine is ESBL and pt had some dysuria so  switched to ertapenem 500 qd   * monitor CBC/diff   * rectal bleed management as per the primary team    The above assessment and plan was discussed with the primary team    Evelin Grove MD  contact on teams  After 5pm and on weekends call [phone]

## 2023-08-15 NOTE — PROGRESS NOTE ADULT - SUBJECTIVE AND OBJECTIVE BOX
Date of Service: 08-15-23 @ 07:31           CARDIOLOGY     PROGRESS  NOTE   ________________________________________________    CHIEF COMPLAINT:Patient is a 78y old  Male who presents with a chief complaint of abdominal pain/ rectal bleed (14 Aug 2023 18:36)  no complain  	  REVIEW OF SYSTEMS:  CONSTITUTIONAL: No fever, weight loss, or fatigue  EYES: No eye pain, visual disturbances, or discharge  ENT:  No difficulty hearing, tinnitus, vertigo; No sinus or throat pain  NECK: No pain or stiffness  RESPIRATORY: No cough, wheezing, chills or hemoptysis; No Shortness of Breath  CARDIOVASCULAR: No chest pain, palpitations, passing out, dizziness, or leg swelling  GASTROINTESTINAL: No abdominal or epigastric pain. No nausea, vomiting, or hematemesis; No diarrhea or constipation. No melena or hematochezia.  GENITOURINARY: No dysuria, frequency, hematuria, or incontinence  NEUROLOGICAL: No headaches, memory loss, loss of strength, numbness, or tremors  SKIN: No itching, burning, rashes, or lesions   LYMPH Nodes: No enlarged glands  ENDOCRINE: No heat or cold intolerance; No hair loss  MUSCULOSKELETAL: No joint pain or swelling; No muscle, back, or extremity pain  PSYCHIATRIC: No depression, anxiety, mood swings, or difficulty sleeping  HEME/LYMPH: No easy bruising, or bleeding gums  ALLERGY AND IMMUNOLOGIC: No hives or eczema	    [x ] All others negative	  [ ] Unable to obtain    PHYSICAL EXAM:  T(C): 36.9 (08-15-23 @ 00:29), Max: 36.9 (08-15-23 @ 00:29)  HR: 68 (08-15-23 @ 00:29) (52 - 81)  BP: 126/63 (08-15-23 @ 00:29) (95/51 - 126/63)  RR: 18 (08-15-23 @ 00:29) (18 - 18)  SpO2: 96% (08-15-23 @ 00:29) (94% - 98%)  Wt(kg): --  I&O's Summary    14 Aug 2023 07:01  -  15 Aug 2023 07:00  --------------------------------------------------------  IN: 870 mL / OUT: 300 mL / NET: 570 mL        Appearance: Normal	  HEENT:   Normal oral mucosa, PERRL, EOMI	  Lymphatic: No lymphadenopathy  Cardiovascular: Normal S1 S2, No JVD, + murmurs, No edema  Respiratory: rhonchi  Psychiatry: A & O x 3, Mood & affect appropriate  Gastrointestinal:  Soft, Non-tender, + BS	  Skin: No rashes, No ecchymoses, No cyanosis	  Neurologic: Non-focal  Extremities: Normal range of motion, No clubbing, cyanosis or edema  Vascular: Peripheral pulses palpable 2+ bilaterally    MEDICATIONS  (STANDING):  ertapenem  IVPB 500 milliGRAM(s) IV Intermittent every 24 hours  escitalopram 5 milliGRAM(s) Oral daily  heparin  Infusion 1200 Unit(s)/Hr (8.5 mL/Hr) IV Continuous <Continuous>  hydrocortisone hemorrhoidal Suppository 1 Suppository(s) Rectal two times a day  levothyroxine 137 MICROGram(s) Oral daily  mesalamine Suppository 1000 milliGRAM(s) Rectal at bedtime  metoprolol succinate  milliGRAM(s) Oral daily  midodrine. 10 milliGRAM(s) Oral three times a day  pantoprazole    Tablet 40 milliGRAM(s) Oral before breakfast  polyethylene glycol 3350 17 Gram(s) Oral two times a day  senna 2 Tablet(s) Oral at bedtime  simvastatin 40 milliGRAM(s) Oral at bedtime      TELEMETRY: 	    ECG:  	  RADIOLOGY:  OTHER: 	  	  LABS:	 	    CARDIAC MARKERS:                          10.8   5.75  )-----------( 146      ( 14 Aug 2023 10:25 )             35.3     08-14    135  |  100  |  34<H>  ----------------------------<  71  3.5   |  21<L>  |  3.46<H>    Ca    9.5      14 Aug 2023 07:30      proBNP:   Lipid Profile:   HgA1c:   TSH:   PTT - ( 14 Aug 2023 22:09 )  PTT:43.9 sec    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  Pleural effusions but poor UF on HD consequent to hypotension  1.  ESRD--HD BIW-TIW.  Orders reviewed with fellow, HD RN  2.  Volume overload--titrate midodrine up to 30 mg pre HD to support 2-3L/rx  3.  Hypotension--midodrine support pre HD.  WOULD NOT d/c beta blocker    Culture - Blood in AM (08.13.23 @ 06:45)    Gram Stain:   Growth in anaerobic bottle: Gram Positive Cocci in Clusters  Growth in aerobic bottle: Gram Positive Cocci in Clusters   Specimen Source: .Blood Blood   Culture Results:   Growth in anaerobic bottle: Gram Positive Cocci in Clusters  Growth in aerobic bottle: Gram Positive Cocci in Clusters        Assessment and plan  ---------------------------  Liam Garza is 79 y.o. M with PMHx Significant for HLD, A-fib, BPH, ESRD, CANDY, who presents to the ED with c.o. abd pain, rectal pain, and bloody stools.  Per patient, he has been experiencing abd pain and rectal bleeding onset 1 day ago.  Patient notes pain is localized to the suprapubic area, radiates to the RLQ.  He notes pain is intermittent and has had minimal improvement with Tylenol.  Nothing seems to exacerbate his pain.  His only other associated symptom is chills.  Otherwise he denies fevers, N/V, HA, changes in his vision, sore throat, SOB, CP, diarrhea or constipation. Blood per rectum is accumulated in his depends. Of note, patient notes rectal pain began in May which she describes as sharp and has been constant since onset.  pt with hx of chronic a.fib, chf systolic EF 35 to 40% s/p ppm ,MICRA sec to ppm exrtacction sec to + BC.  sob sec acute on chronic chf systolc, fluid overload  renal eval possible HD today  ?rectal bleed hold AC, GI eval  ASHD / A. FIb continue beta blocker   repeat echo with hx of pericardial effusion  clear liquid diet  ct ?colitis/ + ua start on abx/ ID noted, awaiting culture  + BC ID follow up discussed with ACP  GI/ MED appreciated  pulmonary edema/ chf, repeat echo check MV, continue HD with increase fluid withdrawal as tolerated  abx switches do Zosyn, ID appreciated  awaiting TTE ?today  will add ace if bp can tolerate  started on iv heparin yesterday, fu hgb, if no bleeding will switch to NOAC  fu hgb   advance diet as  tolerated  doing better, oob to chair/ physical therapy  blood cultures are still +, ID appreciated awaiting ECHO concern about bioprosthetic MV, may need ERENDIRA

## 2023-08-15 NOTE — PROGRESS NOTE ADULT - ATTENDING COMMENTS
# BEULAH - dialysis-dependent since April 10, 2023. (MWF) schedule.  Initially planned to wean off dialysis, but he was admitted for volume overload.   Last HD session was 8/15/2023.     # Volume overload - for additional session of intermittent UF today.     # Medication toxicity monitoring: medication dose reviewed. Please dose medications to CrCl<15    The rest of the recommendations as per fellow's note.    Xochitl Penny MD  Attending Nephrologist  [phone] or via [employer]

## 2023-08-15 NOTE — PROVIDER CONTACT NOTE (CRITICAL VALUE NOTIFICATION) - ACTION/TREATMENT ORDERED:
Blood cultures x2. Start vanco after.
NP notified, vancomycin administered per orders
PA aware of results.
Vancomycin abx used to treat

## 2023-08-15 NOTE — PROGRESS NOTE ADULT - SUBJECTIVE AND OBJECTIVE BOX
date of service: 08-15-23 @ 07:58  afberile  REVIEW OF SYSTEMS:  CONSTITUTIONAL: No fever,  no  weight loss  ENT:  No  tinnitus,   no   vertigo  NECK: No pain or stiffness  RESPIRATORY: No cough, wheezing, chills or hemoptysis;    No Shortness of Breath  CARDIOVASCULAR: No chest pain, palpitations, dizziness  GASTROINTESTINAL: No abdominal or epigastric pain. No nausea, vomiting, or hematemesis; No diarrhea  No melena or hematochezia.  GENITOURINARY: No dysuria, frequency, hematuria, or incontinence  NEUROLOGICAL: No headaches  SKIN: No itching,  no   rash  LYMPH Nodes: No enlarged glands  ENDOCRINE: No heat or cold intolerance  MUSCULOSKELETAL: No joint pain or swelling  PSYCHIATRIC: No depression, anxiety  HEME/LYMPH: No easy bruising, or bleeding gums  ALLERGY AND IMMUNOLOGIC: No hives or eczema	    MEDICATIONS  (STANDING):  ertapenem  IVPB 500 milliGRAM(s) IV Intermittent every 24 hours  escitalopram 5 milliGRAM(s) Oral daily  heparin  Infusion 1200 Unit(s)/Hr (8.5 mL/Hr) IV Continuous <Continuous>  hydrocortisone hemorrhoidal Suppository 1 Suppository(s) Rectal two times a day  levothyroxine 137 MICROGram(s) Oral daily  mesalamine Suppository 1000 milliGRAM(s) Rectal at bedtime  metoprolol succinate  milliGRAM(s) Oral daily  midodrine. 10 milliGRAM(s) Oral three times a day  pantoprazole    Tablet 40 milliGRAM(s) Oral before breakfast  polyethylene glycol 3350 17 Gram(s) Oral two times a day  senna 2 Tablet(s) Oral at bedtime  simvastatin 40 milliGRAM(s) Oral at bedtime    MEDICATIONS  (PRN):  aluminum hydroxide/magnesium hydroxide/simethicone Suspension 30 milliLiter(s) Oral every 6 hours PRN Dyspepsia  midodrine. 20 milliGRAM(s) Oral <User Schedule> PRN PRIOR TO HD  sodium chloride 0.9% Bolus. 100 milliLiter(s) IV Bolus every 5 minutes PRN SBP LESS THAN or EQUAL to 90 mmHg      Vital Signs Last 24 Hrs  T(C): 36.4 (15 Aug 2023 06:10), Max: 36.9 (15 Aug 2023 00:29)  T(F): 97.6 (15 Aug 2023 06:10), Max: 98.5 (15 Aug 2023 00:29)  HR: 80 (15 Aug 2023 06:10) (52 - 81)  BP: 145/78 (15 Aug 2023 06:10) (95/51 - 145/78)  BP(mean): --  RR: 17 (15 Aug 2023 06:10) (17 - 18)  SpO2: 97% (15 Aug 2023 06:10) (94% - 98%)    Parameters below as of 15 Aug 2023 06:10  Patient On (Oxygen Delivery Method): nasal cannula  O2 Flow (L/min): 3    CAPILLARY BLOOD GLUCOSE        I&O's Summary    14 Aug 2023 07:01  -  15 Aug 2023 07:00  --------------------------------------------------------  IN: 870 mL / OUT: 300 mL / NET: 570 mL          Appearance: Normal	  HEENT:   Normal oral mucosa, PERRL, EOMI	  Lymphatic: No lymphadenopathy  Cardiovascular: Normal S1 S2, No JVD  Respiratory: Lungs clear to auscultation	  Gastrointestinal:  Soft, Non-tender, + BS	  Skin: No rash, No ecchymoses	  Extremities:     LABS:                        10.8   5.75  )-----------( 146      ( 14 Aug 2023 10:25 )             35.3     08-14    135  |  100  |  34<H>  ----------------------------<  71  3.5   |  21<L>  |  3.46<H>    Ca    9.5      14 Aug 2023 07:30      PTT - ( 14 Aug 2023 22:09 )  PTT:43.9 sec      Urinalysis Basic - ( 14 Aug 2023 07:30 )    Color: x / Appearance: x / SG: x / pH: x  Gluc: 71 mg/dL / Ketone: x  / Bili: x / Urobili: x   Blood: x / Protein: x / Nitrite: x   Leuk Esterase: x / RBC: x / WBC x   Sq Epi: x / Non Sq Epi: x / Bacteria: x                      Consultant(s) Notes Reviewed:      Care Discussed with Consultants/Other Providers:

## 2023-08-15 NOTE — PROGRESS NOTE ADULT - SUBJECTIVE AND OBJECTIVE BOX
[facility] DIVISION OF KIDNEY DISEASES AND HYPERTENSION   FOLLOW UP NOTE    --------------------------------------------------------------------------------  Chief Complaint:    24 hour events/subjective: Pt. was seen and examined today. States no acute distress, remains on supplemental O2. Pt didn't tolerate UF yesterday due to hypotension. Plan for today is to attempt HD-UF today.       PAST HISTORY  --------------------------------------------------------------------------------  No significant changes to PMH, PSH, FHx, SHx, unless otherwise noted    ALLERGIES & MEDICATIONS  --------------------------------------------------------------------------------  Allergies    Myrbetriq (Hives)  tamsulosin (Hives)  alfuzosin (Hives)  levofloxacin (Hives)    Intolerances      Standing Inpatient Medications  ertapenem  IVPB 500 milliGRAM(s) IV Intermittent every 24 hours  escitalopram 5 milliGRAM(s) Oral daily  heparin  Infusion 1200 Unit(s)/Hr IV Continuous <Continuous>  hydrocortisone hemorrhoidal Suppository 1 Suppository(s) Rectal two times a day  levothyroxine 137 MICROGram(s) Oral daily  mesalamine Suppository 1000 milliGRAM(s) Rectal at bedtime  metoprolol succinate  milliGRAM(s) Oral daily  midodrine. 10 milliGRAM(s) Oral three times a day  pantoprazole    Tablet 40 milliGRAM(s) Oral before breakfast  polyethylene glycol 3350 17 Gram(s) Oral two times a day  senna 2 Tablet(s) Oral at bedtime  simvastatin 40 milliGRAM(s) Oral at bedtime    PRN Inpatient Medications  aluminum hydroxide/magnesium hydroxide/simethicone Suspension 30 milliLiter(s) Oral every 6 hours PRN  midodrine. 20 milliGRAM(s) Oral <User Schedule> PRN  sodium chloride 0.9% Bolus. 100 milliLiter(s) IV Bolus every 5 minutes PRN      REVIEW OF SYSTEMS  --------------------------------------------------------------------------------  Gen: No fevers/chills  Head/Eyes/Ears: No HA   Respiratory: No dyspnea, cough  CV: No chest pain  GI: No abdominal pain, diarrhea  : No dysuria, hematuria  MSK: No  edema  Skin: No rashes  Heme: No easy bruising or bleeding    All other systems were reviewed and are negative, except as noted.    VITALS/PHYSICAL EXAM  --------------------------------------------------------------------------------  T(C): 36.4 (08-15-23 @ 08:53), Max: 36.9 (08-15-23 @ 00:29)  HR: 66 (08-15-23 @ 08:53) (52 - 81)  BP: 130/65 (08-15-23 @ 08:53) (95/51 - 145/78)  RR: 18 (08-15-23 @ 08:53) (17 - 18)  SpO2: 96% (08-15-23 @ 08:53) (94% - 98%)  Wt(kg): --        08-14-23 @ 07:01  -  08-15-23 @ 07:00  --------------------------------------------------------  IN: 966 mL / OUT: 300 mL / NET: 666 mL    08-15-23 @ 07:01  -  08-15-23 @ 12:52  --------------------------------------------------------  IN: 320 mL / OUT: 0 mL / NET: 320 mL        Physical Exam:  	Gen: NAD  	HEENT: Anicteric  	Pulm: bibasilar crackles R>L  	CV: S1S2+  	Abd: Soft, +BS   	Ext: No LE edema B/L  	Neuro: Awake          : Knapp cath+ with clear urine   	Skin: Warm and dry      LABS/STUDIES  --------------------------------------------------------------------------------              10.8   5.75  >-----------<  146      [08-14-23 @ 10:25]              35.3     135  |  100  |  34  ----------------------------<  71      [08-14-23 @ 07:30]  3.5   |  21  |  3.46        Ca     9.5     [08-14-23 @ 07:30]        PTT: 43.9       [08-14-23 @ 22:09]      Creatinine Trend:  SCr 3.46 [08-14 @ 07:30]  SCr 2.78 [08-13 @ 06:43]  SCr 2.36 [08-11 @ 21:12]  SCr 2.33 [08-11 @ 01:23]    Urinalysis - [08-14-23 @ 07:30]      Color  / Appearance  / SG  / pH       Gluc 71 / Ketone   / Bili  / Urobili        Blood  / Protein  / Leuk Est  / Nitrite       RBC  / WBC  / Hyaline  / Gran  / Sq Epi  / Non Sq Epi  / Bacteria       HBsAb <3.0      [08-11-23 @ 21:13]  HBsAg Nonreact      [08-11-23 @ 21:13]  HBcAb Nonreact      [08-11-23 @ 21:13]

## 2023-08-15 NOTE — PROGRESS NOTE ADULT - SUBJECTIVE AND OBJECTIVE BOX
Follow Up:  bacteremia    Interval History/ROS: pt afebrile, resolved abd pain, no cough or vomiting              Allergies  Myrbetriq (Hives)  tamsulosin (Hives)  alfuzosin (Hives)  levofloxacin (Hives)        ANTIMICROBIALS:  ertapenem  IVPB 500 every 24 hours      OTHER MEDS:  aluminum hydroxide/magnesium hydroxide/simethicone Suspension 30 milliLiter(s) Oral every 6 hours PRN  escitalopram 5 milliGRAM(s) Oral daily  heparin  Infusion 1200 Unit(s)/Hr IV Continuous <Continuous>  hydrocortisone hemorrhoidal Suppository 1 Suppository(s) Rectal two times a day  levothyroxine 137 MICROGram(s) Oral daily  mesalamine Suppository 1000 milliGRAM(s) Rectal at bedtime  metoprolol succinate  milliGRAM(s) Oral daily  midodrine. 10 milliGRAM(s) Oral three times a day  midodrine. 20 milliGRAM(s) Oral <User Schedule> PRN  pantoprazole    Tablet 40 milliGRAM(s) Oral before breakfast  polyethylene glycol 3350 17 Gram(s) Oral two times a day  senna 2 Tablet(s) Oral at bedtime  simvastatin 40 milliGRAM(s) Oral at bedtime  sodium chloride 0.9% Bolus. 100 milliLiter(s) IV Bolus every 5 minutes PRN      Vital Signs Last 24 Hrs  T(C): 36.6 (15 Aug 2023 17:30), Max: 36.9 (15 Aug 2023 00:29)  T(F): 97.9 (15 Aug 2023 17:30), Max: 98.5 (15 Aug 2023 00:29)  HR: 56 (15 Aug 2023 17:30) (56 - 81)  BP: 122/63 (15 Aug 2023 17:30) (121/60 - 145/78)  BP(mean): --  RR: 18 (15 Aug 2023 17:30) (17 - 18)  SpO2: 97% (15 Aug 2023 17:30) (96% - 98%)    Parameters below as of 15 Aug 2023 17:30  Patient On (Oxygen Delivery Method): nasal cannula  O2 Flow (L/min): 3      Physical Exam:  General:    NAD, non toxic  Respiratory:   clear b/l  abd:   soft, not tender  :     no CVAT, no suprapubic tenderness, no delgadillo  Musculoskeletal : no joint swelling, no edema  Skin:   petechial rash on torose and UE  vascular: R permacath                        10.8   5.75  )-----------( 146      ( 14 Aug 2023 10:25 )             35.3       08-14    135  |  100  |  34<H>  ----------------------------<  71  3.5   |  21<L>  |  3.46<H>    Ca    9.5      14 Aug 2023 07:30        Urinalysis Basic - ( 14 Aug 2023 07:30 )    Color: x / Appearance: x / SG: x / pH: x  Gluc: 71 mg/dL / Ketone: x  / Bili: x / Urobili: x   Blood: x / Protein: x / Nitrite: x   Leuk Esterase: x / RBC: x / WBC x   Sq Epi: x / Non Sq Epi: x / Bacteria: x        MICROBIOLOGY:  v  .Blood Blood  08-13-23   Growth in aerobic and anaerobic bottles: Staphylococcus epidermidis  Coagulase Negative Staphylococci isolated from a single blood culture set  may represent contamination.  Contact the Microbiology Department at [phone] if susceptibility  testing is clinically indicated.  Direct identification is available within approximately 3-5  hours either by Blood Panel Multiplexed PCR or Direct  MALDI-TOF. Details: [email or web address]  --  Blood Culture PCR      Clean Catch Clean Catch (Midstream)  08-11-23   >100,000 CFU/ml Klebsiella pneumoniae ESBL  --  Klebsiella pneumoniae ESBL      .Blood Blood-Venous  08-11-23   Growth in anaerobic bottle: Clostridium cadaveris "Susceptibilities not  performed"  Direct identification is available within approximately 3-5  hours either by Blood Panel Multiplexed PCR or Direct  MALDI-TOF. Details: [email or web address]  --  Blood Culture PCR      .Blood Blood-Venous  08-11-23   Growth in anaerobic bottle: Gram Positive Rods  Direct identification is available within approximately 3-5  hours either by Blood Panel Multiplexed PCR or Direct  MALDI-TOF. Details: [email or web address]  --  Blood Culture PCR          Rapid RVP Result: [employer] (08-11 @ 01:27)        RADIOLOGY:  Images independently visualized and reviewed personally, findings as below  < from: NM GI Bleed Localization (NM GI Bleed Localization .) (08.11.23 @ 17:40) >  IMPRESSION:    No evidence of active bleeding site.      < end of copied text >  < from: CT Abdomen and Pelvis w/ IV Cont (08.11.23 @ 04:32) >  IMPRESSION:    Abundant fecal material within the rectum with mild wall thickening and   trace surrounding stranding. Recommend clinical correlation to assess for   stercoral colitis. No bowel obstruction.    Cholelithiasis.    Findings of pulmonary edema with moderate right and small left pleural   effusion.    < end of copied text >

## 2023-08-16 DIAGNOSIS — D64.9 ANEMIA, UNSPECIFIED: ICD-10-CM

## 2023-08-16 DIAGNOSIS — E87.70 FLUID OVERLOAD, UNSPECIFIED: ICD-10-CM

## 2023-08-16 PROBLEM — K62.5 RECTAL BLEEDING: Status: ACTIVE | Noted: 2023-07-26

## 2023-08-16 LAB
ANION GAP SERPL CALC-SCNC: 15 MMOL/L — SIGNIFICANT CHANGE UP (ref 5–17)
APTT BLD: 49.9 SEC — HIGH (ref 24.5–35.6)
APTT BLD: 63.1 SEC — HIGH (ref 24.5–35.6)
BUN SERPL-MCNC: 22 MG/DL — SIGNIFICANT CHANGE UP (ref 7–23)
CALCIUM SERPL-MCNC: 10.1 MG/DL — SIGNIFICANT CHANGE UP (ref 8.4–10.5)
CHLORIDE SERPL-SCNC: 95 MMOL/L — LOW (ref 96–108)
CO2 SERPL-SCNC: 25 MMOL/L — SIGNIFICANT CHANGE UP (ref 22–31)
CREAT SERPL-MCNC: 2.75 MG/DL — HIGH (ref 0.5–1.3)
EGFR: 23 ML/MIN/1.73M2 — LOW
GLUCOSE SERPL-MCNC: 86 MG/DL — SIGNIFICANT CHANGE UP (ref 70–99)
HCT VFR BLD CALC: 38.8 % — LOW (ref 39–50)
HGB BLD-MCNC: 11.9 G/DL — LOW (ref 13–17)
MAGNESIUM SERPL-MCNC: 2 MG/DL — SIGNIFICANT CHANGE UP (ref 1.6–2.6)
MCHC RBC-ENTMCNC: 28.6 PG — SIGNIFICANT CHANGE UP (ref 27–34)
MCHC RBC-ENTMCNC: 30.7 GM/DL — LOW (ref 32–36)
MCV RBC AUTO: 93.3 FL — SIGNIFICANT CHANGE UP (ref 80–100)
NRBC # BLD: 0 /100 WBCS — SIGNIFICANT CHANGE UP (ref 0–0)
PHOSPHATE SERPL-MCNC: 3.1 MG/DL — SIGNIFICANT CHANGE UP (ref 2.5–4.5)
PLATELET # BLD AUTO: 139 K/UL — LOW (ref 150–400)
POTASSIUM SERPL-MCNC: 3.3 MMOL/L — LOW (ref 3.5–5.3)
POTASSIUM SERPL-SCNC: 3.3 MMOL/L — LOW (ref 3.5–5.3)
RBC # BLD: 4.16 M/UL — LOW (ref 4.2–5.8)
RBC # FLD: 15.5 % — HIGH (ref 10.3–14.5)
SODIUM SERPL-SCNC: 135 MMOL/L — SIGNIFICANT CHANGE UP (ref 135–145)
WBC # BLD: 4.68 K/UL — SIGNIFICANT CHANGE UP (ref 3.8–10.5)
WBC # FLD AUTO: 4.68 K/UL — SIGNIFICANT CHANGE UP (ref 3.8–10.5)

## 2023-08-16 PROCEDURE — 99232 SBSQ HOSP IP/OBS MODERATE 35: CPT

## 2023-08-16 PROCEDURE — 99232 SBSQ HOSP IP/OBS MODERATE 35: CPT | Mod: GC

## 2023-08-16 RX ORDER — POTASSIUM CHLORIDE 20 MEQ
20 PACKET (EA) ORAL ONCE
Refills: 0 | Status: COMPLETED | OUTPATIENT
Start: 2023-08-16 | End: 2023-08-16

## 2023-08-16 RX ORDER — POLYETHYLENE GLYCOL 3350 17 G/17G
17 POWDER, FOR SOLUTION ORAL DAILY
Refills: 0 | Status: DISCONTINUED | OUTPATIENT
Start: 2023-08-16 | End: 2023-08-25

## 2023-08-16 RX ADMIN — MIDODRINE HYDROCHLORIDE 10 MILLIGRAM(S): 2.5 TABLET ORAL at 18:34

## 2023-08-16 RX ADMIN — ERTAPENEM SODIUM 100 MILLIGRAM(S): 1 INJECTION, POWDER, LYOPHILIZED, FOR SOLUTION INTRAMUSCULAR; INTRAVENOUS at 21:38

## 2023-08-16 RX ADMIN — ESCITALOPRAM OXALATE 5 MILLIGRAM(S): 10 TABLET, FILM COATED ORAL at 11:03

## 2023-08-16 RX ADMIN — MIDODRINE HYDROCHLORIDE 10 MILLIGRAM(S): 2.5 TABLET ORAL at 11:43

## 2023-08-16 RX ADMIN — SIMVASTATIN 40 MILLIGRAM(S): 20 TABLET, FILM COATED ORAL at 21:37

## 2023-08-16 RX ADMIN — POLYETHYLENE GLYCOL 3350 17 GRAM(S): 17 POWDER, FOR SOLUTION ORAL at 05:23

## 2023-08-16 RX ADMIN — Medication 100 MILLIGRAM(S): at 11:44

## 2023-08-16 RX ADMIN — Medication 1 SUPPOSITORY(S): at 05:23

## 2023-08-16 RX ADMIN — HEPARIN SODIUM 8.5 UNIT(S)/HR: 5000 INJECTION INTRAVENOUS; SUBCUTANEOUS at 05:55

## 2023-08-16 RX ADMIN — Medication 137 MICROGRAM(S): at 05:23

## 2023-08-16 RX ADMIN — Medication 20 MILLIEQUIVALENT(S): at 11:04

## 2023-08-16 RX ADMIN — PANTOPRAZOLE SODIUM 40 MILLIGRAM(S): 20 TABLET, DELAYED RELEASE ORAL at 05:23

## 2023-08-16 NOTE — PROGRESS NOTE ADULT - ASSESSMENT
78  year old male    h/o  CAD s/p stent , asa.     A-fib/ PPM, , hypothyroid, and total left knee replacement    prior ppm  interrogation  with  WCT   Ct chest, retrosternal hematoma.  mod left pl effusion/ has   c/c leg  pain/  h/o  non complaince  with meds       admitted  with  abd pain and rectal bleeding onset 1 day ago.. arrive s form n home     has  no pain  now    c/c   systolic  and  diastolic  chf,,  cardiomyopathy,. Mitral  valvuloplasty ,  h/o  c/c  l/edema  of L  leg    h/o    c/c AFIB,   has  PPM         eliquis   on  hold  now      h/o c/c   Anemia, , hb is  12.  gi   known to  dr joann INFANTE,     cath, with 2 vessel disease,  s/p  CABG and  MVR  surg d r marni      echo,  on 7/2022,  ef  35/ new/ severe  TR,  and ,  cath,   was non  obstructive     s/p   MSSA  bacterinia , in march 2023, completed  iv ancef..    s/p   explant pf  ppm  and  Micra  placement on  3/30/23. dr sunitha rocha      Echo,  3/2023,  ef  25,  mod  AI. severe  TR . RV hypokinesis    BEULAH, from  AIN, dx  recently , ancef / was  switched   to iv vanco,   CKD, on HD,  has  right Permcath.  hpuse renal   s/p  renal bx on 4/ 14.. 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 + , Ig A  Lamda. ,  known to heme  dr dobson.  pe r heme,  no  need  for  b marrow  bx    cxr/  CT chest,  pl  effusion,  will need  HD  gi    f/p,  no  bleeding  now/  nuclear  scan, no  bleeding   bacteremia   Clostridium cadeveris,  iv   ertapenem   rpt  bcx  from  8/13,  GPC/  Staph  epi/  need  discussion.  maybe  cannot  be  dismissed  as  a contamianant,  given  priro   history  of bacteremia   has Micra/  MVR  rpt bc 8/14,  thus  far, is  negative/ aiwat  ID  f/p                   card /  dr mikayla dick       < from: Intra-Operative Transesophageal Echo W or WO Ultrasound Enhancing Agent (03.30.23 @ 12:48) >  --------------------------------------------------------------------------------  PRE-BYPASS FINDINGS  Left Ventricle:  Left ventricular ejection fraction is estimated at 25 to 30%. There is severe kypokinesis in the left ventricle.  Right Ventricle:  Moderately reduced systolic function. Moderately reduced systolic function. There is moderate hypokinesis in the right ventricle.  Left Atrium:  Diffuse smoke visualized in LA.  Aortic Valve:  The aortic valve appears trileaflet. There is moderate aortic regurgitation.  No obvious vegetations visualized  Mitral Valve:  Bioprosthetic valve is present in the mitral position. There is calcification of the mitral valve annulus.  No obvious vegetations visualized.  Tricuspid Valve:  There is moderate-severe tricuspid regurgitation. TR unchanged following lead extraction.  Pulmonic Valve:  There is mild pulmonic regurgitation.  Pericardium:  No pericardial effusion seen. No pericardial effusion visualized before and after lead extraction.  Electronically signed by Jimmy Gambino on 3/30/2023 at 3:00:58 PM  *** Final ***  < end of copied text >

## 2023-08-16 NOTE — PROGRESS NOTE ADULT - SUBJECTIVE AND OBJECTIVE BOX
[facility]  Thanh Michaels PA-C  [address] [place] [id number] [phone]    INTERVAL HPI/ OVERNIGHT EVENTS:  pt seen and examined, no new events  no abdominal pain   having multiple BMs daily    MEDICATIONS  (STANDING):  escitalopram 5 milliGRAM(s) Oral daily  hydrocortisone hemorrhoidal Suppository 1 Suppository(s) Rectal two times a day  levothyroxine 137 MICROGram(s) Oral daily  mesalamine Suppository 1000 milliGRAM(s) Rectal at bedtime  metoprolol succinate  milliGRAM(s) Oral daily  midodrine. 10 milliGRAM(s) Oral three times a day  pantoprazole    Tablet 40 milliGRAM(s) Oral before breakfast  piperacillin/tazobactam IVPB.. 3.375 Gram(s) IV Intermittent every 8 hours  polyethylene glycol 3350 17 Gram(s) Oral two times a day  senna 2 Tablet(s) Oral at bedtime  simvastatin 40 milliGRAM(s) Oral at bedtime    MEDICATIONS  (PRN):  aluminum hydroxide/magnesium hydroxide/simethicone Suspension 30 milliLiter(s) Oral every 6 hours PRN Dyspepsia  midodrine. 10 milliGRAM(s) Oral <User Schedule> PRN PRIOR TO HD  sodium chloride 0.9% Bolus. 100 milliLiter(s) IV Bolus every 5 minutes PRN SBP LESS THAN or EQUAL to 90 mmHg  escitalopram 5 milliGRAM(s) Oral daily  hydrocortisone hemorrhoidal Suppository 1 Suppository(s) Rectal two times a day  levothyroxine 137 MICROGram(s) Oral daily  mesalamine Suppository 1000 milliGRAM(s) Rectal at bedtime  metoprolol succinate  milliGRAM(s) Oral daily  PAST MEDICAL & SURGICAL HISTORY:  Afib  not on anticoagulation      CAD (coronary artery disease)      Varicose vein of leg      Hypothyroid      Pacemaker      H/O fracture of hip        H/O asbestosis      HTN (hypertension)      Pacemaker  [employer]tr[employer] - Model RVDR01 1/10/2012      Stented coronary artery  drug eluding stent 2007      H/O detached retina repair        S/P cataract surgery      History of hip surgery  left hip ORIF      H/O varicose vein stripping   left leg      History of left knee replacement   - multiple infections post op requiring reoperation in , , 2019)      H/O foot surgery  for infection of right foot 2019      H/O inguinal hernia repair  right       Family history:  No pertinent family history in first degree relatives    FH: skin cancer (Father)        Social History:   no etoh no cigs   no ivda    ROS:     General:  No wt loss, fevers, chills, night sweats, fatigue,   Eyes:  Good vision, no reported pain  ENT:  No sore throat, pain, runny nose, dysphagia  CV:  No pain, palpitations, hypo/hypertension  Resp:  No dyspnea, cough, tachypnea, wheezing  GI:  No pain, No nausea, No vomiting, No diarrhea, No constipation, No weight loss, No fever, No pruritis, No rectal bleeding, No tarry stools, No dysphagia,  :  No pain, bleeding, incontinence, nocturia  Muscle:  No pain, weakness  Neuro:  No weakness, tingling, memory problems  Psych:  No fatigue, insomnia, mood problems, depression  Endocrine:  No polyuria, polydipsia, cold/heat intolerance  Heme:  No petechiae, ecchymosis, easy bruisability  Skin:  No rash, tattoos, scars, edema      PHYSICAL EXAM:   Vital Signs Last 24 Hrs  T(C): 36.6 (16 Aug 2023 04:45), Max: 36.9 (16 Aug 2023 00:54)  T(F): 97.8 (16 Aug 2023 04:45), Max: 98.5 (16 Aug 2023 00:54)  HR: 62 (16 Aug 2023 04:45) (49 - 72)  BP: 132/53 (16 Aug 2023 04:45) (118/58 - 145/77)  BP(mean): --  RR: 18 (16 Aug 2023 04:45) (18 - 18)  SpO2: 93% (16 Aug 2023 04:45) (93% - 98%)    Parameters below as of 16 Aug 2023 04:45  Patient On (Oxygen Delivery Method): nasal cannula  O2 Flow (L/min): 2      Daily Height in cm: 170.18 (11 Aug 2023 00:36)    Daily     GENERAL:  Appears stated age, well-groomed, well-nourished, no distress  HEENT:  NC/AT,  conjunctivae clear and pink, no thyromegaly, nodules, adenopathy, no JVD, sclera -anicteric  CHEST:  Full & symmetric excursion, no increased effort, breath sounds clear  HEART:  Regular rhythm, S1, S2, no murmur/rub/S3/S4, no abdominal bruit, no edema  ABDOMEN:  Soft, non-tender, non-distended, normoactive bowel sounds,  no masses ,no hepato-splenomegaly, no signs of chronic liver disease  EXTEREMITIES:  no cyanosis,clubbing or edema  SKIN:  No rash/erythema/ecchymoses/petechiae/wounds/abscess/warm/dry  NEURO:  Alert, oriented, no asterixis, no tremor, no encephalopathy    LABS:                        11.9   4.68  )-----------( 139      ( 16 Aug 2023 07:07 )             38.8   08-    135  |  95<L>  |  22  ----------------------------<  86  3.3<L>   |  25  |  2.75<H>    Ca    10.1      16 Aug 2023 07:09  Phos  3.1     08-  Mg     2.0     08-        8-11    LIVER FUNCTIONS - ( 11 Aug 2023 01:23 )  Alb: 3.4 g/dL / Pro: 7.0 g/dL / ALK PHOS: 137 U/L / ALT: 12 U/L / AST: 19 U/L / GGT: x           PT/INR - ( 11 Aug 2023 01:23 )   PT: 23.5 sec;   INR: 2.29 ratio         PTT - ( 11 Aug 2023 01:23 )  PTT:36.4 sec  Urinalysis Basic - ( 11 Aug 2023 06:13 )    Color: Dark Yellow / Appearance: Turbid / S.017 / pH: x  Gluc: x / Ketone: Negative  / Bili: Small / Urobili: 3 mg/dL   Blood: x / Protein: 100 mg/dL / Nitrite: Positive   Leuk Esterase: Large / RBC: 463 /hpf /  /HPF   Sq Epi: x / Non Sq Epi: x / Bacteria: Many      Amylase Serum--      Lipase serum12       Ammonia--      Imaging:    < from: NM GI Bleed Localization (NM GI Bleed Localization .) (23 @ 17:40) >  ACC: [id number] EXAM:  NM GI BLEEDING IMG   ORDERED BY: JAYLENE PARKS     PROCEDURE DATE:  2023          INTERPRETATION:  RADIOPHARMACEUTICAL:  20.0 mCi Tc-99m labeled autologous   red blood cells, I.V.    CLINICAL INFORMATION: GI Bleeding referred for localization of bleeding   site.    TECHNIQUE: Dynamic imaging of the abdomen and pelvis was performed for 2   hours following administration of radiolabeled autologous red blood   cells. Static images of the abdomen and pelvis in the anterior and   lateral views were obtained immediately thereafter. An Anterior view of   the head/neck was obtained for  purposes.    COMPARISON: None.    FINDINGS: There is physiologic distribution of radiolabeled red cells in   the abdomen and pelvis. There is no abnormal focus of increased   radiotracer activity to suggest the presence of an active bleeding site.    IMPRESSION:    No evidence of active bleeding site.     These results were discussed with DASIA Parks NP, by Dr. BARBRA Natarajan via   telephone with read back at about 6:00 PM on 2023.    --- End of Report ---            HAM NATARAJAN MD; Attending Nuclear Medicine  This document has been electronically signed. Aug 11 2023  6:01PM    < end of copied text >

## 2023-08-16 NOTE — PROGRESS NOTE ADULT - ASSESSMENT
79 m with  A-fib, CAD, MS s/p CABG and MVR, BPH, ESRD on HD via R permacath, recent MSSA bacteremia 3/56484 s/p PPM extraction and Micra now p/w rectal bleed and loss of appetite, no fever but stated that has had dysuria for a while I  here afebrile, WBC: 7 Hgb: 12  u/a positive  CT: Abundant fecal material within the rectum with mild wall thickening and trace surrounding stranding. Recommend clinical correlation to assess for stercoral colitis. No bowel obstruction. Cholelithiasis. Findings of pulmonary edema with moderate right and small left pleural   effusion.    rectal bleed, stool impaction and stercoral colitis and clostridium cadaveris bacteremia s/p disimpaction  dysuria and positive u/a, UTI, no fever or WBC, now urine cx with ESBL klebsiella, has h/p MVR, MSSA bacteremia 3/2023 and had negative ERENDIRA, PPM extracted and s/p micra  repeat blood cx 8/13 with staph epi which is likely a contaminant as pt is afebrile and doing well, repeat cx negative    * c/w ertapenem 500 qd, will complete a 10 day course for bacteremia, on discharge switch to PO flaygl 500 q 8 and levo 250 qd for the urine through 8/22  * rectal bleed management as per the primary team    The above assessment and plan was discussed with the primary team    Evelin Grove MD  contact on teams  After 5pm and on weekends call [phone]   79 m with  A-fib, CAD, MS s/p CABG and MVR, BPH, ESRD on HD via R permacath, recent MSSA bacteremia 3/88247 s/p PPM extraction and Micra now p/w rectal bleed and loss of appetite, no fever but stated that has had dysuria for a while I  here afebrile, WBC: 7 Hgb: 12  u/a positive  CT: Abundant fecal material within the rectum with mild wall thickening and trace surrounding stranding. Recommend clinical correlation to assess for stercoral colitis. No bowel obstruction. Cholelithiasis. Findings of pulmonary edema with moderate right and small left pleural   effusion.    rectal bleed, stool impaction and stercoral colitis and clostridium cadaveris bacteremia s/p disimpaction  dysuria and positive u/a, UTI, no fever or WBC, now urine cx with ESBL klebsiella, has h/p MVR, MSSA bacteremia 3/2023 and had negative ERENDIRA, PPM extracted and s/p micra  repeat blood cx 8/13 with staph epi which is likely a contaminant as pt is afebrile and doing well, repeat cx negative    * c/w ertapenem 500 qd, will complete a 10 day course for bacteremia, on discharge switch to PO flaygl 500 q 8  through 8/22  * rectal bleed management as per the primary team    The above assessment and plan was discussed with the primary team    Evelin Grove MD  contact on teams  After 5pm and on weekends call [phone]

## 2023-08-16 NOTE — PROGRESS NOTE ADULT - SUBJECTIVE AND OBJECTIVE BOX
[facility] DIVISION OF KIDNEY DISEASES AND HYPERTENSION   FOLLOW UP NOTE    --------------------------------------------------------------------------------  Chief Complaint:  Pt with BEULAH on HD since April 10, 2023 in setting of biopsy proven interstitial disease likely pyelonephritis (renal bx on 4/14/23). Had a Scr of 1 in April 2023.     24 hour events/subjective: Pt. was seen and examined today. Denies any SOB, tolerated UF session yesterday with -1.4L with slight improvement in O2 supplementation from 3L to 2L today. Patient has been getting UF sessions in between his regular HD days , will go for regular HD session today per schedule (MWF) .        PAST HISTORY  --------------------------------------------------------------------------------  No significant changes to PMH, PSH, FHx, SHx, unless otherwise noted    ALLERGIES & MEDICATIONS  --------------------------------------------------------------------------------  Allergies    Myrbetriq (Hives)  tamsulosin (Hives)  alfuzosin (Hives)  levofloxacin (Hives)    Intolerances      Standing Inpatient Medications  ertapenem  IVPB 500 milliGRAM(s) IV Intermittent every 24 hours  escitalopram 5 milliGRAM(s) Oral daily  heparin  Infusion 1200 Unit(s)/Hr IV Continuous <Continuous>  hydrocortisone hemorrhoidal Suppository 1 Suppository(s) Rectal two times a day  levothyroxine 137 MICROGram(s) Oral daily  mesalamine Suppository 1000 milliGRAM(s) Rectal at bedtime  metoprolol succinate  milliGRAM(s) Oral daily  midodrine. 10 milliGRAM(s) Oral three times a day  pantoprazole    Tablet 40 milliGRAM(s) Oral before breakfast  polyethylene glycol 3350 17 Gram(s) Oral daily  senna 2 Tablet(s) Oral at bedtime  simvastatin 40 milliGRAM(s) Oral at bedtime    PRN Inpatient Medications  aluminum hydroxide/magnesium hydroxide/simethicone Suspension 30 milliLiter(s) Oral every 6 hours PRN  midodrine. 20 milliGRAM(s) Oral <User Schedule> PRN  sodium chloride 0.9% Bolus. 100 milliLiter(s) IV Bolus every 5 minutes PRN      REVIEW OF SYSTEMS  --------------------------------------------------------------------------------  Gen: No fevers/chills  Head/Eyes/Ears: No HA   Respiratory: No dyspnea, cough  CV: No chest pain  GI: No abdominal pain, diarrhea  : No dysuria, hematuria  MSK: No  edema  Skin: No rashes  Heme: No easy bruising or bleeding    All other systems were reviewed and are negative, except as noted.    VITALS/PHYSICAL EXAM  --------------------------------------------------------------------------------  T(C): 36.4 (08-16-23 @ 11:56), Max: 36.9 (08-16-23 @ 00:54)  HR: 65 (08-16-23 @ 11:56) (49 - 72)  BP: 138/62 (08-16-23 @ 11:56) (118/58 - 145/77)  RR: 18 (08-16-23 @ 11:56) (18 - 18)  SpO2: 95% (08-16-23 @ 11:56) (93% - 98%)  Wt(kg): --        08-15-23 @ 07:01  -  08-16-23 @ 07:00  --------------------------------------------------------  IN: 1040 mL / OUT: 2550 mL / NET: -1510 mL        Physical Exam:  	Gen: NAD  	HEENT: Anicteric  	Pulm: scattered bibasilar crackles  	CV: S1S2+  	Abd: Soft, +BS   	Ext: No LE edema B/L  	Neuro: Awake          : Knapp cath+ with clear urine   	Skin: Warm and dry      LABS/STUDIES  --------------------------------------------------------------------------------              11.9   4.68  >-----------<  139      [08-16-23 @ 07:07]              38.8     135  |  95  |  22  ----------------------------<  86      [08-16-23 @ 07:09]  3.3   |  25  |  2.75        Ca     10.1     [08-16-23 @ 07:09]      Mg     2.0     [08-16-23 @ 07:09]      Phos  3.1     [08-16-23 @ 07:09]        PTT: 63.1       [08-16-23 @ 07:09]      Creatinine Trend:  SCr 2.75 [08-16 @ 07:09]  SCr 3.46 [08-14 @ 07:30]  SCr 2.78 [08-13 @ 06:43]  SCr 2.36 [08-11 @ 21:12]  SCr 2.33 [08-11 @ 01:23]    Urinalysis - [08-16-23 @ 07:09]      Color  / Appearance  / SG  / pH       Gluc 86 / Ketone   / Bili  / Urobili        Blood  / Protein  / Leuk Est  / Nitrite       RBC  / WBC  / Hyaline  / Gran  / Sq Epi  / Non Sq Epi  / Bacteria       HBsAb <3.0      [08-11-23 @ 21:13]  HBsAg Nonreact      [08-11-23 @ 21:13]  HBcAb Nonreact      [08-11-23 @ 21:13]      Tacrolimus  Cyclosporine  Sirolimus  Mycophenolate  BK PCR  CMV PCR  Parvo PCR  EBV PCR

## 2023-08-16 NOTE — PROGRESS NOTE ADULT - PROBLEM SELECTOR PLAN 2
UF sessions in between regular HD session to help with SOB Pt has responded well to previous UF sessions in between regular HD days  Now will continue with HD per schedule (MWF)

## 2023-08-16 NOTE — PROGRESS NOTE ADULT - ATTENDING COMMENTS
Tolerated UF yesterday.    # BEULAH - dialysis-dependent since April 10, 2023. (MWF) schedule.  Initially planned to wean off dialysis, but he was admitted for volume overload.   He has been dialysis-depending since April 2023 (>3 months). He persistently needs 3X/week treatment and on occasions, additional intermittent UF sessions.   I doubt he will recover sufficiently to be off dialysis. We will talk to patient tomorrow about considering vascular access planning.     # Volume overload - UF 1.5 liters today.     # Medication toxicity monitoring: medication dose reviewed. Please dose medications to CrCl<15    The rest of the recommendations as per fellow's note.    Xochitl Penny MD  Attending Nephrologist  [phone] or via [employer] .

## 2023-08-16 NOTE — PROGRESS NOTE ADULT - ASSESSMENT
BEULAH on HD (M/W/F) via [facility], monitoring for recovery with UF sessions in between regular HD sessions for Volume overload.

## 2023-08-16 NOTE — PROGRESS NOTE ADULT - SUBJECTIVE AND OBJECTIVE BOX
Date of Service: 08-16-23 @ 07:38           CARDIOLOGY     PROGRESS  NOTE   ________________________________________________    CHIEF COMPLAINT:Patient is a 78y old  Male who presents with a chief complaint of abdominal pain/ rectal bleed (15 Aug 2023 18:24)  doing better  	  REVIEW OF SYSTEMS:  CONSTITUTIONAL: No fever, weight loss, or fatigue  EYES: No eye pain, visual disturbances, or discharge  ENT:  No difficulty hearing, tinnitus, vertigo; No sinus or throat pain  NECK: No pain or stiffness  RESPIRATORY: No cough, wheezing, chills or hemoptysis; No Shortness of Breath  CARDIOVASCULAR: No chest pain, palpitations, passing out, dizziness, or leg swelling  GASTROINTESTINAL: No abdominal or epigastric pain. No nausea, vomiting, or hematemesis; No diarrhea or constipation. No melena or hematochezia.  GENITOURINARY: No dysuria, frequency, hematuria, or incontinence  NEUROLOGICAL: No headaches, memory loss, loss of strength, numbness, or tremors  SKIN: No itching, burning, rashes, or lesions   LYMPH Nodes: No enlarged glands  ENDOCRINE: No heat or cold intolerance; No hair loss  MUSCULOSKELETAL: No joint pain or swelling; No muscle, back, or extremity pain  PSYCHIATRIC: No depression, anxiety, mood swings, or difficulty sleeping  HEME/LYMPH: No easy bruising, or bleeding gums  ALLERGY AND IMMUNOLOGIC: No hives or eczema	    [ x] All others negative	  [ ] Unable to obtain    PHYSICAL EXAM:  T(C): 36.6 (08-16-23 @ 04:45), Max: 36.9 (08-16-23 @ 00:54)  HR: 62 (08-16-23 @ 04:45) (49 - 72)  BP: 132/53 (08-16-23 @ 04:45) (118/58 - 145/77)  RR: 18 (08-16-23 @ 04:45) (18 - 18)  SpO2: 93% (08-16-23 @ 04:45) (93% - 98%)  Wt(kg): --  I&O's Summary    15 Aug 2023 07:01  -  16 Aug 2023 07:00  --------------------------------------------------------  IN: 1040 mL / OUT: 2550 mL / NET: -1510 mL        Appearance: Normal	  HEENT:   Normal oral mucosa, PERRL, EOMI	  Lymphatic: No lymphadenopathy  Cardiovascular: Normal S1 S2, No JVD, + murmurs, No edema  Respiratory: rhonchi  Psychiatry: A & O x 3, Mood & affect appropriate  Gastrointestinal:  Soft, Non-tender, + BS	  Skin: No rashes, No ecchymoses, No cyanosis	  Neurologic: Non-focal  Extremities: Normal range of motion, No clubbing, cyanosis or edema  Vascular: Peripheral pulses palpable 2+ bilaterally    MEDICATIONS  (STANDING):  ertapenem  IVPB 500 milliGRAM(s) IV Intermittent every 24 hours  escitalopram 5 milliGRAM(s) Oral daily  heparin  Infusion 1200 Unit(s)/Hr (8.5 mL/Hr) IV Continuous <Continuous>  hydrocortisone hemorrhoidal Suppository 1 Suppository(s) Rectal two times a day  levothyroxine 137 MICROGram(s) Oral daily  mesalamine Suppository 1000 milliGRAM(s) Rectal at bedtime  metoprolol succinate  milliGRAM(s) Oral daily  midodrine. 10 milliGRAM(s) Oral three times a day  pantoprazole    Tablet 40 milliGRAM(s) Oral before breakfast  polyethylene glycol 3350 17 Gram(s) Oral two times a day  senna 2 Tablet(s) Oral at bedtime  simvastatin 40 milliGRAM(s) Oral at bedtime      TELEMETRY: 	    ECG:  	  RADIOLOGY:  OTHER: 	  	  LABS:	 	    CARDIAC MARKERS:                                11.9   4.68  )-----------( 139      ( 16 Aug 2023 07:07 )             38.8           proBNP:   Lipid Profile:   HgA1c:   TSH:   PTT - ( 14 Aug 2023 22:09 )  PTT:43.9 sec    * f/u the repeat blood cx but no more vanco  * the urine is ESBL and pt had some dysuria so  switched to ertapenem 500 qd   * monitor CBC/diff   * rectal bleed management as per the primary team      Assessment and plan  ---------------------------  Liam Garza is 79 y.o. M with PMHx Significant for HLD, A-fib, BPH, ESRD, CANDY, who presents to the ED with c.o. abd pain, rectal pain, and bloody stools.  Per patient, he has been experiencing abd pain and rectal bleeding onset 1 day ago.  Patient notes pain is localized to the suprapubic area, radiates to the RLQ.  He notes pain is intermittent and has had minimal improvement with Tylenol.  Nothing seems to exacerbate his pain.  His only other associated symptom is chills.  Otherwise he denies fevers, N/V, HA, changes in his vision, sore throat, SOB, CP, diarrhea or constipation. Blood per rectum is accumulated in his depends. Of note, patient notes rectal pain began in May which she describes as sharp and has been constant since onset.  pt with hx of chronic a.fib, chf systolic EF 35 to 40% s/p ppm ,MICRA sec to ppm exrtacction sec to + BC.  sob sec acute on chronic chf systolc, fluid overload  renal eval possible HD today  ?rectal bleed hold AC, GI eval  ASHD / A. FIb continue beta blocker   repeat echo with hx of pericardial effusion  clear liquid diet  ct ?colitis/ + ua start on abx/ ID noted, awaiting culture  + BC ID follow up discussed with ACP  GI/ MED appreciated  pulmonary edema/ chf, repeat echo check MV, continue HD with increase fluid withdrawal as tolerated  abx switches do Zosyn, ID appreciated  awaiting TTE ?today  will add ace if bp can tolerate  started on iv heparin yesterday, fu hgb, if no bleeding will switch to NOAC  fu hgb   advance diet as  tolerated  doing better, oob to chair/ physical therapy  blood cultures are still +, ID appreciated awaiting ECHO concern about bioprosthetic MV, may need ERENDIRA  doing much better clinically, may switch iv heparin to NOAC if no procedure needed, hgb stable

## 2023-08-16 NOTE — PROGRESS NOTE ADULT - SUBJECTIVE AND OBJECTIVE BOX
Follow Up:  bacteremia    Interval History/ROS: pt afebrile, resolved abd pain, no rectal bleed, cough or vomiting, repeat blood cx negative          Allergies  Myrbetriq (Hives)  tamsulosin (Hives)  alfuzosin (Hives)  levofloxacin (Hives)        ANTIMICROBIALS:  ertapenem  IVPB 500 every 24 hours      OTHER MEDS:  aluminum hydroxide/magnesium hydroxide/simethicone Suspension 30 milliLiter(s) Oral every 6 hours PRN  escitalopram 5 milliGRAM(s) Oral daily  heparin  Infusion 1200 Unit(s)/Hr IV Continuous <Continuous>  hydrocortisone hemorrhoidal Suppository 1 Suppository(s) Rectal two times a day  levothyroxine 137 MICROGram(s) Oral daily  mesalamine Suppository 1000 milliGRAM(s) Rectal at bedtime  metoprolol succinate  milliGRAM(s) Oral daily  midodrine. 10 milliGRAM(s) Oral three times a day  midodrine. 20 milliGRAM(s) Oral <User Schedule> PRN  pantoprazole    Tablet 40 milliGRAM(s) Oral before breakfast  polyethylene glycol 3350 17 Gram(s) Oral daily  senna 2 Tablet(s) Oral at bedtime  simvastatin 40 milliGRAM(s) Oral at bedtime  sodium chloride 0.9% Bolus. 100 milliLiter(s) IV Bolus every 5 minutes PRN      Vital Signs Last 24 Hrs  T(C): 36.4 (16 Aug 2023 11:56), Max: 36.9 (16 Aug 2023 00:54)  T(F): 97.5 (16 Aug 2023 11:56), Max: 98.5 (16 Aug 2023 00:54)  HR: 65 (16 Aug 2023 11:56) (49 - 72)  BP: 138/62 (16 Aug 2023 11:56) (118/58 - 138/62)  BP(mean): --  RR: 18 (16 Aug 2023 11:56) (18 - 18)  SpO2: 95% (16 Aug 2023 11:56) (93% - 98%)    Parameters below as of 16 Aug 2023 11:56  Patient On (Oxygen Delivery Method): room air  O2 Flow (L/min): 2      Physical Exam:  General:    NAD, non toxic  Respiratory:   comfortable on RA  abd:   soft, not tender  :     no CVAT, no suprapubic tenderness, no delgadillo  Musculoskeletal : no joint swelling, no edema  Skin:   petechial rash on torose and UE  vascular: R permacath                                     11.9   4.68  )-----------( 139      ( 16 Aug 2023 07:07 )             38.8       08-16    135  |  95<L>  |  22  ----------------------------<  86  3.3<L>   |  25  |  2.75<H>    Ca    10.1      16 Aug 2023 07:09  Phos  3.1     08-16  Mg     2.0     08-16        Urinalysis Basic - ( 16 Aug 2023 07:09 )    Color: x / Appearance: x / SG: x / pH: x  Gluc: 86 mg/dL / Ketone: x  / Bili: x / Urobili: x   Blood: x / Protein: x / Nitrite: x   Leuk Esterase: x / RBC: x / WBC x   Sq Epi: x / Non Sq Epi: x / Bacteria: x        MICROBIOLOGY:  v  .Blood Blood-Peripheral  08-14-23   No growth at 24 hours  --  --      .Blood Blood-Peripheral  08-14-23   No growth at 24 hours  --  --      .Blood Blood  08-13-23   Growth in aerobic and anaerobic bottles: Staphylococcus epidermidis  Coagulase Negative Staphylococci isolated from a single blood culture set  may represent contamination.  Contact the Microbiology Department at [phone] if susceptibility  testing is clinically indicated.  Direct identification is available within approximately 3-5  hours either by Blood Panel Multiplexed PCR or Direct  MALDI-TOF. Details: [email or web address]  --  Blood Culture PCR      Clean Catch Clean Catch (Midstream)  08-11-23   >100,000 CFU/ml Klebsiella pneumoniae ESBL  --  Klebsiella pneumoniae ESBL      .Blood Blood-Venous  08-11-23   Growth in anaerobic bottle: Clostridium cadaveris "Susceptibilities not  performed"  Direct identification is available within approximately 3-5  hours either by Blood Panel Multiplexed PCR or Direct  MALDI-TOF. Details: [email or web address]  --  Blood Culture PCR      .Blood Blood-Venous  08-11-23   Growth in anaerobic bottle: Gram Positive Rods  Direct identification is available within approximately 3-5  hours either by Blood Panel Multiplexed PCR or Direct  MALDI-TOF. Details: [email or web address]  --  Blood Culture PCR          Rapid RVP Result: Teresetec (08-11 @ 01:27)        RADIOLOGY:  Images independently visualized and reviewed personally, findings as below  < from: NM GI Bleed Localization (NM GI Bleed Localization .) (08.11.23 @ 17:40) >  IMPRESSION:    No evidence of active bleeding site.      < end of copied text >  < from: CT Abdomen and Pelvis w/ IV Cont (08.11.23 @ 04:32) >  IMPRESSION:    Abundant fecal material within the rectum with mild wall thickening and   trace surrounding stranding. Recommend clinical correlation to assess for   stercoral colitis. No bowel obstruction.    Cholelithiasis.    Findings of pulmonary edema with moderate right and small left pleural   effusion.      < end of copied text >

## 2023-08-16 NOTE — PROGRESS NOTE ADULT - SUBJECTIVE AND OBJECTIVE BOX
date of service: 08-16-23 @ 08:52  afberile  REVIEW OF SYSTEMS:  CONSTITUTIONAL: No fever,  no  weight loss  ENT:  No  tinnitus,   no   vertigo  NECK: No pain or stiffness  RESPIRATORY: No cough, wheezing, chills or hemoptysis;    No Shortness of Breath  CARDIOVASCULAR: No chest pain, palpitations, dizziness  GASTROINTESTINAL: No abdominal or epigastric pain. No nausea, vomiting, or hematemesis; No diarrhea  No melena or hematochezia.  GENITOURINARY: No dysuria, frequency, hematuria, or incontinence  NEUROLOGICAL: No headaches  SKIN: No itching,  no   rash  LYMPH Nodes: No enlarged glands  ENDOCRINE: No heat or cold intolerance  MUSCULOSKELETAL: No joint pain or swelling  PSYCHIATRIC: No depression, anxiety  HEME/LYMPH: No easy bruising, or bleeding gums  ALLERGY AND IMMUNOLOGIC: No hives or eczema	    MEDICATIONS  (STANDING):  ertapenem  IVPB 500 milliGRAM(s) IV Intermittent every 24 hours  escitalopram 5 milliGRAM(s) Oral daily  heparin  Infusion 1200 Unit(s)/Hr (8.5 mL/Hr) IV Continuous <Continuous>  hydrocortisone hemorrhoidal Suppository 1 Suppository(s) Rectal two times a day  levothyroxine 137 MICROGram(s) Oral daily  mesalamine Suppository 1000 milliGRAM(s) Rectal at bedtime  metoprolol succinate  milliGRAM(s) Oral daily  midodrine. 10 milliGRAM(s) Oral three times a day  pantoprazole    Tablet 40 milliGRAM(s) Oral before breakfast  polyethylene glycol 3350 17 Gram(s) Oral two times a day  potassium chloride    Tablet ER 20 milliEquivalent(s) Oral once  senna 2 Tablet(s) Oral at bedtime  simvastatin 40 milliGRAM(s) Oral at bedtime    MEDICATIONS  (PRN):  aluminum hydroxide/magnesium hydroxide/simethicone Suspension 30 milliLiter(s) Oral every 6 hours PRN Dyspepsia  midodrine. 20 milliGRAM(s) Oral <User Schedule> PRN PRIOR TO HD  sodium chloride 0.9% Bolus. 100 milliLiter(s) IV Bolus every 5 minutes PRN SBP LESS THAN or EQUAL to 90 mmHg      Vital Signs Last 24 Hrs  T(C): 36.6 (16 Aug 2023 04:45), Max: 36.9 (16 Aug 2023 00:54)  T(F): 97.8 (16 Aug 2023 04:45), Max: 98.5 (16 Aug 2023 00:54)  HR: 62 (16 Aug 2023 04:45) (49 - 72)  BP: 132/53 (16 Aug 2023 04:45) (118/58 - 145/77)  BP(mean): --  RR: 18 (16 Aug 2023 04:45) (18 - 18)  SpO2: 93% (16 Aug 2023 04:45) (93% - 98%)    Parameters below as of 16 Aug 2023 04:45  Patient On (Oxygen Delivery Method): nasal cannula  O2 Flow (L/min): 2    CAPILLARY BLOOD GLUCOSE        I&O's Summary    15 Aug 2023 07:01  -  16 Aug 2023 07:00  --------------------------------------------------------  IN: 1040 mL / OUT: 2550 mL / NET: -1510 mL          Appearance: Normal	  HEENT:   Normal oral mucosa, PERRL, EOMI	  Lymphatic: No lymphadenopathy  Cardiovascular: Normal S1 S2, No JVD  Respiratory: Lungs clear to auscultation	  Gastrointestinal:  Soft, Non-tender, + BS	  Skin: No rash, No ecchymoses	  Extremities:     LABS:                        11.9   4.68  )-----------( 139      ( 16 Aug 2023 07:07 )             38.8     08-16    135  |  95<L>  |  22  ----------------------------<  86  3.3<L>   |  25  |  2.75<H>    Ca    10.1      16 Aug 2023 07:09  Phos  3.1     08-16  Mg     2.0     08-16      PTT - ( 16 Aug 2023 07:09 )  PTT:63.1 sec      Urinalysis Basic - ( 16 Aug 2023 07:09 )    Color: x / Appearance: x / SG: x / pH: x  Gluc: 86 mg/dL / Ketone: x  / Bili: x / Urobili: x   Blood: x / Protein: x / Nitrite: x   Leuk Esterase: x / RBC: x / WBC x   Sq Epi: x / Non Sq Epi: x / Bacteria: x                      Consultant(s) Notes Reviewed:      Care Discussed with Consultants/Other Providers:

## 2023-08-17 LAB
-  AMPICILLIN/SULBACTAM: SIGNIFICANT CHANGE UP
-  CEFAZOLIN: SIGNIFICANT CHANGE UP
-  CLINDAMYCIN: SIGNIFICANT CHANGE UP
-  ERYTHROMYCIN: SIGNIFICANT CHANGE UP
-  GENTAMICIN: SIGNIFICANT CHANGE UP
-  OXACILLIN: SIGNIFICANT CHANGE UP
-  PENICILLIN: SIGNIFICANT CHANGE UP
-  RIFAMPIN: SIGNIFICANT CHANGE UP
-  TETRACYCLINE: SIGNIFICANT CHANGE UP
-  TRIMETHOPRIM/SULFAMETHOXAZOLE: SIGNIFICANT CHANGE UP
-  VANCOMYCIN: SIGNIFICANT CHANGE UP
ANION GAP SERPL CALC-SCNC: 9 MMOL/L — SIGNIFICANT CHANGE UP (ref 5–17)
APTT BLD: 52.3 SEC — HIGH (ref 24.5–35.6)
APTT BLD: 87.5 SEC — HIGH (ref 24.5–35.6)
BUN SERPL-MCNC: 14 MG/DL — SIGNIFICANT CHANGE UP (ref 7–23)
CALCIUM SERPL-MCNC: 9.9 MG/DL — SIGNIFICANT CHANGE UP (ref 8.4–10.5)
CHLORIDE SERPL-SCNC: 97 MMOL/L — SIGNIFICANT CHANGE UP (ref 96–108)
CO2 SERPL-SCNC: 29 MMOL/L — SIGNIFICANT CHANGE UP (ref 22–31)
CREAT SERPL-MCNC: 2.03 MG/DL — HIGH (ref 0.5–1.3)
CULTURE RESULTS: SIGNIFICANT CHANGE UP
EGFR: 33 ML/MIN/1.73M2 — LOW
GLUCOSE SERPL-MCNC: 97 MG/DL — SIGNIFICANT CHANGE UP (ref 70–99)
HCT VFR BLD CALC: 37.1 % — LOW (ref 39–50)
HGB BLD-MCNC: 11.4 G/DL — LOW (ref 13–17)
MAGNESIUM SERPL-MCNC: 1.7 MG/DL — SIGNIFICANT CHANGE UP (ref 1.6–2.6)
MCHC RBC-ENTMCNC: 28.4 PG — SIGNIFICANT CHANGE UP (ref 27–34)
MCHC RBC-ENTMCNC: 30.7 GM/DL — LOW (ref 32–36)
MCV RBC AUTO: 92.5 FL — SIGNIFICANT CHANGE UP (ref 80–100)
METHOD TYPE: SIGNIFICANT CHANGE UP
NRBC # BLD: 0 /100 WBCS — SIGNIFICANT CHANGE UP (ref 0–0)
ORGANISM # SPEC MICROSCOPIC CNT: SIGNIFICANT CHANGE UP
ORGANISM # SPEC MICROSCOPIC CNT: SIGNIFICANT CHANGE UP
PHOSPHATE SERPL-MCNC: 2.4 MG/DL — LOW (ref 2.5–4.5)
PLATELET # BLD AUTO: 137 K/UL — LOW (ref 150–400)
POTASSIUM SERPL-MCNC: 3.8 MMOL/L — SIGNIFICANT CHANGE UP (ref 3.5–5.3)
POTASSIUM SERPL-SCNC: 3.8 MMOL/L — SIGNIFICANT CHANGE UP (ref 3.5–5.3)
RBC # BLD: 4.01 M/UL — LOW (ref 4.2–5.8)
RBC # FLD: 15.4 % — HIGH (ref 10.3–14.5)
SODIUM SERPL-SCNC: 135 MMOL/L — SIGNIFICANT CHANGE UP (ref 135–145)
SPECIMEN SOURCE: SIGNIFICANT CHANGE UP
WBC # BLD: 5.07 K/UL — SIGNIFICANT CHANGE UP (ref 3.8–10.5)
WBC # FLD AUTO: 5.07 K/UL — SIGNIFICANT CHANGE UP (ref 3.8–10.5)

## 2023-08-17 PROCEDURE — 99232 SBSQ HOSP IP/OBS MODERATE 35: CPT

## 2023-08-17 RX ORDER — ERTAPENEM SODIUM 1 G/1
500 INJECTION, POWDER, LYOPHILIZED, FOR SOLUTION INTRAMUSCULAR; INTRAVENOUS EVERY 24 HOURS
Refills: 0 | Status: DISCONTINUED | OUTPATIENT
Start: 2023-08-17 | End: 2023-08-22

## 2023-08-17 RX ADMIN — HEPARIN SODIUM 9.5 UNIT(S)/HR: 5000 INJECTION INTRAVENOUS; SUBCUTANEOUS at 00:13

## 2023-08-17 RX ADMIN — Medication 137 MICROGRAM(S): at 06:20

## 2023-08-17 RX ADMIN — SIMVASTATIN 40 MILLIGRAM(S): 20 TABLET, FILM COATED ORAL at 21:49

## 2023-08-17 RX ADMIN — Medication 100 MILLIGRAM(S): at 06:21

## 2023-08-17 RX ADMIN — MIDODRINE HYDROCHLORIDE 10 MILLIGRAM(S): 2.5 TABLET ORAL at 14:40

## 2023-08-17 RX ADMIN — MIDODRINE HYDROCHLORIDE 10 MILLIGRAM(S): 2.5 TABLET ORAL at 06:21

## 2023-08-17 RX ADMIN — HEPARIN SODIUM 9.5 UNIT(S)/HR: 5000 INJECTION INTRAVENOUS; SUBCUTANEOUS at 07:36

## 2023-08-17 RX ADMIN — Medication 1000 MILLIGRAM(S): at 21:49

## 2023-08-17 RX ADMIN — ERTAPENEM SODIUM 100 MILLIGRAM(S): 1 INJECTION, POWDER, LYOPHILIZED, FOR SOLUTION INTRAMUSCULAR; INTRAVENOUS at 19:30

## 2023-08-17 RX ADMIN — ESCITALOPRAM OXALATE 5 MILLIGRAM(S): 10 TABLET, FILM COATED ORAL at 14:40

## 2023-08-17 RX ADMIN — HEPARIN SODIUM 9.5 UNIT(S)/HR: 5000 INJECTION INTRAVENOUS; SUBCUTANEOUS at 19:31

## 2023-08-17 RX ADMIN — PANTOPRAZOLE SODIUM 40 MILLIGRAM(S): 20 TABLET, DELAYED RELEASE ORAL at 06:20

## 2023-08-17 RX ADMIN — MIDODRINE HYDROCHLORIDE 10 MILLIGRAM(S): 2.5 TABLET ORAL at 17:33

## 2023-08-17 RX ADMIN — POLYETHYLENE GLYCOL 3350 17 GRAM(S): 17 POWDER, FOR SOLUTION ORAL at 14:42

## 2023-08-17 NOTE — PROGRESS NOTE ADULT - SUBJECTIVE AND OBJECTIVE BOX
Date of Service: 08-17-23 @ 07:26           CARDIOLOGY     PROGRESS  NOTE   ________________________________________________    CHIEF COMPLAINT:Patient is a 78y old  Male who presents with a chief complaint of abdominal pain/ rectal bleed (16 Aug 2023 17:14)  doing better  	  REVIEW OF SYSTEMS:  CONSTITUTIONAL: No fever, weight loss, or fatigue  EYES: No eye pain, visual disturbances, or discharge  ENT:  No difficulty hearing, tinnitus, vertigo; No sinus or throat pain  NECK: No pain or stiffness  RESPIRATORY: No cough, wheezing, chills or hemoptysis; No Shortness of Breath  CARDIOVASCULAR: No chest pain, palpitations, passing out, dizziness, or leg swelling  GASTROINTESTINAL: No abdominal or epigastric pain. No nausea, vomiting, or hematemesis; No diarrhea or constipation. No melena or hematochezia.  GENITOURINARY: No dysuria, frequency, hematuria, or incontinence  NEUROLOGICAL: No headaches, memory loss, loss of strength, numbness, or tremors  SKIN: No itching, burning, rashes, or lesions   LYMPH Nodes: No enlarged glands  ENDOCRINE: No heat or cold intolerance; No hair loss  MUSCULOSKELETAL: No joint pain or swelling; No muscle, back, or extremity pain  PSYCHIATRIC: No depression, anxiety, mood swings, or difficulty sleeping  HEME/LYMPH: No easy bruising, or bleeding gums  ALLERGY AND IMMUNOLOGIC: No hives or eczema	    [x ] All others negative	  [ ] Unable to obtain    PHYSICAL EXAM:  T(C): 36.4 (08-17-23 @ 05:15), Max: 36.9 (08-16-23 @ 19:55)  HR: 77 (08-17-23 @ 05:15) (62 - 77)  BP: 138/73 (08-17-23 @ 05:15) (108/64 - 138/73)  RR: 18 (08-17-23 @ 05:15) (18 - 18)  SpO2: 93% (08-17-23 @ 05:15) (93% - 100%)  Wt(kg): --  I&O's Summary    16 Aug 2023 07:01  -  17 Aug 2023 07:00  --------------------------------------------------------  IN: 700 mL / OUT: 2700 mL / NET: -2000 mL        Appearance: Normal	  HEENT:   Normal oral mucosa, PERRL, EOMI	  Lymphatic: No lymphadenopathy  Cardiovascular: Normal S1 S2, No JVD, + murmurs, No edema  Respiratory: Lungs clear to auscultation	  Psychiatry: A & O x 3, Mood & affect appropriate  Gastrointestinal:  Soft, Non-tender, + BS	  Skin: No rashes, No ecchymoses, No cyanosis	  Neurologic: Non-focal  Extremities: Normal range of motion, No clubbing, cyanosis or edema  Vascular: Peripheral pulses palpable 2+ bilaterally    MEDICATIONS  (STANDING):  escitalopram 5 milliGRAM(s) Oral daily  heparin  Infusion 1200 Unit(s)/Hr (9.5 mL/Hr) IV Continuous <Continuous>  hydrocortisone hemorrhoidal Suppository 1 Suppository(s) Rectal two times a day  levothyroxine 137 MICROGram(s) Oral daily  mesalamine Suppository 1000 milliGRAM(s) Rectal at bedtime  metoprolol succinate  milliGRAM(s) Oral daily  midodrine. 10 milliGRAM(s) Oral three times a day  pantoprazole    Tablet 40 milliGRAM(s) Oral before breakfast  polyethylene glycol 3350 17 Gram(s) Oral daily  senna 2 Tablet(s) Oral at bedtime  simvastatin 40 milliGRAM(s) Oral at bedtime      TELEMETRY: 	    ECG:  	  RADIOLOGY:  OTHER: 	  	  LABS:	 	    CARDIAC MARKERS:                                11.9   4.68  )-----------( 139      ( 16 Aug 2023 07:07 )             38.8     08-16    135  |  95<L>  |  22  ----------------------------<  86  3.3<L>   |  25  |  2.75<H>    Ca    10.1      16 Aug 2023 07:09  Phos  3.1     08-16  Mg     2.0     08-16      proBNP:   Lipid Profile:   HgA1c:   TSH:   PTT - ( 16 Aug 2023 22:33 )  PTT:49.9 sec    < from: [facility] Duplex Upper Ext Vein Scan, Right (04.06.23 @ 13:34) >  No evidence of right upper extremity deep venous thrombosis.    * c/w ertapenem 500 qd, will complete a 10 day course for bacteremia, on discharge switch to PO flaygl 500 q 8 and levo 250 qd for the urine through 8/22  * rectal bleed management as per the primary team      Assessment and plan  ---------------------------  Liam Garza is 79 y.o. M with PMHx Significant for HLD, A-fib, BPH, ESRD, CANDY, who presents to the ED with c.o. abd pain, rectal pain, and bloody stools.  Per patient, he has been experiencing abd pain and rectal bleeding onset 1 day ago.  Patient notes pain is localized to the suprapubic area, radiates to the RLQ.  He notes pain is intermittent and has had minimal improvement with Tylenol.  Nothing seems to exacerbate his pain.  His only other associated symptom is chills.  Otherwise he denies fevers, N/V, HA, changes in his vision, sore throat, SOB, CP, diarrhea or constipation. Blood per rectum is accumulated in his depends. Of note, patient notes rectal pain began in May which she describes as sharp and has been constant since onset.  pt with hx of chronic a.fib, chf systolic EF 35 to 40% s/p ppm ,MICRA sec to ppm exrtacction sec to + BC.  sob sec acute on chronic chf systolc, fluid overload  renal eval possible HD today  ?rectal bleed hold AC, GI eval  ASHD / A. FIb continue beta blocker   repeat echo with hx of pericardial effusion  clear liquid diet  ct ?colitis/ + ua start on abx/ ID noted, awaiting culture  + BC ID follow up discussed with ACP  GI/ MED appreciated  pulmonary edema/ chf, repeat echo check MV, continue HD with increase fluid withdrawal as tolerated  abx switches do Zosyn, ID appreciated  awaiting TTE ?today  will add ace if bp can tolerate  started on iv heparin yesterday, fu hgb, if no bleeding will switch to NOAC  fu hgb   advance diet as  tolerated  doing better, oob to chair/ physical therapy  blood cultures are still +, ID appreciated awaiting ECHO concern about bioprosthetic MV, may need ERENDIRA  doing much better clinically, may switch iv heparin to NOAC if no procedure needed, hgb stable  ID / renal appreciated, ?vascular for AV fistulae placement, but bacteriemia needs to be resolved  dc planning

## 2023-08-17 NOTE — PROGRESS NOTE ADULT - ASSESSMENT
78  year old male    h/o  CAD s/p stent , asa.     A-fib/ PPM, , hypothyroid, and total left knee replacement    prior ppm  interrogation  with  WCT   Ct chest, retrosternal hematoma.  mod left pl effusion/ has   c/c leg  pain/  h/o  non complaince  with meds       admitted  with  abd pain and rectal bleeding onset 1 day ago.. arrive s form n home     has  no pain  now    c/c   systolic  and  diastolic  chf,,  cardiomyopathy,. Mitral  valvuloplasty ,  h/o  c/c  l/edema  of L  leg    h/o    c/c AFIB,   has  PPM         eliquis   on  hold  now      h/o c/c   Anemia, , hb is  12.  gi   known to  dr joann INFANTE,     cath, with 2 vessel disease,  s/p  CABG and  MVR  surg d r marni      echo,  on 7/2022,  ef  35/ new/ severe  TR,  and ,  cath,   was non  obstructive     s/p   MSSA  bacterinia , in march 2023, completed  iv ancef..    s/p   explant pf  ppm  and  Micra  placement on  3/30/23. dr sunitha rocha      Echo,  3/2023,  ef  25,  mod  AI. severe  TR . RV hypokinesis    BEULAH, from  AIN, dx  recently , ancef / was  switched   to iv vanco,   CKD, on HD,  has  right Permcath.  hpuse renal   s/p  renal bx on 4/ 14.. 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 + , Ig A  Lamda. ,  known to heme  dr dobson.  pe r heme,  no  need  for  b marrow  bx    cxr/  CT chest,  pl  effusion,  will need  HD  gi    f/p,  no  bleeding  now/  nuclear  scan, no  bleeding   bacteremia   Clostridium cadeveris,  iv   ertapenem   rpt  bcx  from  8/13, Staph  epi/  need  discussion., ? contamianant,  given  priro   history  of bacteremia   has Micra/  MVR  rpt bc 8/14,  thus  far, is  negative/ aiwat  ID  f/p/ pt  appears  non toxic                   card /  dr mikayla dick       < from: Intra-Operative Transesophageal Echo W or WO Ultrasound Enhancing Agent (03.30.23 @ 12:48) >  --------------------------------------------------------------------------------  PRE-BYPASS FINDINGS  Left Ventricle:  Left ventricular ejection fraction is estimated at 25 to 30%. There is severe kypokinesis in the left ventricle.  Right Ventricle:  Moderately reduced systolic function. Moderately reduced systolic function. There is moderate hypokinesis in the right ventricle.  Left Atrium:  Diffuse smoke visualized in LA.  Aortic Valve:  The aortic valve appears trileaflet. There is moderate aortic regurgitation.  No obvious vegetations visualized  Mitral Valve:  Bioprosthetic valve is present in the mitral position. There is calcification of the mitral valve annulus.  No obvious vegetations visualized.  Tricuspid Valve:  There is moderate-severe tricuspid regurgitation. TR unchanged following lead extraction.  Pulmonic Valve:  There is mild pulmonic regurgitation.  Pericardium:  No pericardial effusion seen. No pericardial effusion visualized before and after lead extraction.  Electronically signed by Jimmy Gambino on 3/30/2023 at 3:00:58 PM  *** Final ***  < end of copied text >

## 2023-08-17 NOTE — PROGRESS NOTE ADULT - ASSESSMENT
79 m with  A-fib, CAD, MS s/p CABG and MVR, BPH, ESRD on HD via R permacath, recent MSSA bacteremia 3/16517 s/p PPM extraction and Micra now p/w rectal bleed and loss of appetite, no fever but stated that has had dysuria for a while I  here afebrile, WBC: 7 Hgb: 12  u/a positive  CT: Abundant fecal material within the rectum with mild wall thickening and trace surrounding stranding. Recommend clinical correlation to assess for stercoral colitis. No bowel obstruction. Cholelithiasis. Findings of pulmonary edema with moderate right and small left pleural   effusion.    rectal bleed, stool impaction and stercoral colitis and clostridium cadaveris bacteremia s/p disimpaction  dysuria and positive u/a, UTI, no fever or WBC, now urine cx with ESBL klebsiella, has h/p MVR, MSSA bacteremia 3/2023 and had negative ERENDIRA, PPM extracted and s/p micra  repeat blood cx 8/13 with staph epi which is likely a contaminant as pt is afebrile and doing well, repeat cx negative    * c/w ertapenem 500 qd, will complete a 10 day course for bacteremia, on discharge switch to PO flaygl 500 q 8 through 8/22  * rectal bleed management as per the primary team    The above assessment and plan was discussed with the primary team    Evelin Grove MD  contact on teams  After 5pm and on weekends call [phone]

## 2023-08-17 NOTE — PROGRESS NOTE ADULT - SUBJECTIVE AND OBJECTIVE BOX
[facility]  Thanh Michaels PA-C  [address] [place] [id number] [phone]    INTERVAL HPI/ OVERNIGHT EVENTS:  pt seen and examined, no new events  no abdominal pain, tolerating diet, no N/V/D    MEDICATIONS  (STANDING):  escitalopram 5 milliGRAM(s) Oral daily  hydrocortisone hemorrhoidal Suppository 1 Suppository(s) Rectal two times a day  levothyroxine 137 MICROGram(s) Oral daily  mesalamine Suppository 1000 milliGRAM(s) Rectal at bedtime  metoprolol succinate  milliGRAM(s) Oral daily  midodrine. 10 milliGRAM(s) Oral three times a day  pantoprazole    Tablet 40 milliGRAM(s) Oral before breakfast  piperacillin/tazobactam IVPB.. 3.375 Gram(s) IV Intermittent every 8 hours  polyethylene glycol 3350 17 Gram(s) Oral two times a day  senna 2 Tablet(s) Oral at bedtime  simvastatin 40 milliGRAM(s) Oral at bedtime    MEDICATIONS  (PRN):  aluminum hydroxide/magnesium hydroxide/simethicone Suspension 30 milliLiter(s) Oral every 6 hours PRN Dyspepsia  midodrine. 10 milliGRAM(s) Oral <User Schedule> PRN PRIOR TO HD  sodium chloride 0.9% Bolus. 100 milliLiter(s) IV Bolus every 5 minutes PRN SBP LESS THAN or EQUAL to 90 mmHg  escitalopram 5 milliGRAM(s) Oral daily  hydrocortisone hemorrhoidal Suppository 1 Suppository(s) Rectal two times a day  levothyroxine 137 MICROGram(s) Oral daily  mesalamine Suppository 1000 milliGRAM(s) Rectal at bedtime  metoprolol succinate  milliGRAM(s) Oral daily  PAST MEDICAL & SURGICAL HISTORY:  Afib  not on anticoagulation      CAD (coronary artery disease)      Varicose vein of leg      Hypothyroid      Pacemaker      H/O fracture of hip        H/O asbestosis      HTN (hypertension)      Pacemaker  [employer]tr[employer] - Model RVDR01 1/10/2012      Stented coronary artery  drug eluding stent 2007      H/O detached retina repair        S/P cataract surgery      History of hip surgery  left hip ORIF      H/O varicose vein stripping   left leg      History of left knee replacement   - multiple infections post op requiring reoperation in , , 2019)      H/O foot surgery  for infection of right foot 2019      H/O inguinal hernia repair  right       Family history:  No pertinent family history in first degree relatives    FH: skin cancer (Father)        Social History:   no etoh no cigs   no ivda    ROS:     General:  No wt loss, fevers, chills, night sweats, fatigue,   Eyes:  Good vision, no reported pain  ENT:  No sore throat, pain, runny nose, dysphagia  CV:  No pain, palpitations, hypo/hypertension  Resp:  No dyspnea, cough, tachypnea, wheezing  GI:  No pain, No nausea, No vomiting, No diarrhea, No constipation, No weight loss, No fever, No pruritis, No rectal bleeding, No tarry stools, No dysphagia,  :  No pain, bleeding, incontinence, nocturia  Muscle:  No pain, weakness  Neuro:  No weakness, tingling, memory problems  Psych:  No fatigue, insomnia, mood problems, depression  Endocrine:  No polyuria, polydipsia, cold/heat intolerance  Heme:  No petechiae, ecchymosis, easy bruisability  Skin:  No rash, tattoos, scars, edema      PHYSICAL EXAM:   Vital Signs Last 24 Hrs  T(C): 36.4 (17 Aug 2023 09:34), Max: 36.9 (16 Aug 2023 19:55)  T(F): 97.6 (17 Aug 2023 09:34), Max: 98.5 (16 Aug 2023 19:55)  HR: 75 (17 Aug 2023 09:34) (62 - 77)  BP: 134/86 (17 Aug 2023 09:34) (108/64 - 138/73)  BP(mean): --  RR: 18 (17 Aug 2023 09:34) (18 - 18)  SpO2: 94% (17 Aug 2023 09:34) (93% - 100%)    Parameters below as of 17 Aug 2023 09:34  Patient On (Oxygen Delivery Method): nasal cannula  O2 Flow (L/min): 2      Daily Height in cm: 170.18 (11 Aug 2023 00:36)    Daily     GENERAL:  Appears stated age, well-groomed, well-nourished, no distress  HEENT:  NC/AT,  conjunctivae clear and pink, no thyromegaly, nodules, adenopathy, no JVD, sclera -anicteric  CHEST:  Full & symmetric excursion, no increased effort, breath sounds clear  HEART:  Regular rhythm, S1, S2, no murmur/rub/S3/S4, no abdominal bruit, no edema  ABDOMEN:  Soft, non-tender, non-distended, normoactive bowel sounds,  no masses ,no hepato-splenomegaly, no signs of chronic liver disease  EXTEREMITIES:  no cyanosis,clubbing or edema  SKIN:  No rash/erythema/ecchymoses/petechiae/wounds/abscess/warm/dry  NEURO:  Alert, oriented, no asterixis, no tremor, no encephalopathy    LABS:                        11.9   4.68  )-----------( 139      ( 16 Aug 2023 07:07 )             38.8   08-    135  |  95<L>  |  22  ----------------------------<  86  3.3<L>   |  25  |  2.75<H>    Ca    10.1      16 Aug 2023 07:09  Phos  3.1     08-16  Mg     2.0     08-          8-11    LIVER FUNCTIONS - ( 11 Aug 2023 01:23 )  Alb: 3.4 g/dL / Pro: 7.0 g/dL / ALK PHOS: 137 U/L / ALT: 12 U/L / AST: 19 U/L / GGT: x           PT/INR - ( 11 Aug 2023 01:23 )   PT: 23.5 sec;   INR: 2.29 ratio         PTT - ( 11 Aug 2023 01:23 )  PTT:36.4 sec  Urinalysis Basic - ( 11 Aug 2023 06:13 )    Color: Dark Yellow / Appearance: Turbid / S.017 / pH: x  Gluc: x / Ketone: Negative  / Bili: Small / Urobili: 3 mg/dL   Blood: x / Protein: 100 mg/dL / Nitrite: Positive   Leuk Esterase: Large / RBC: 463 /hpf /  /HPF   Sq Epi: x / Non Sq Epi: x / Bacteria: Many      Amylase Serum--      Lipase serum12       Ammonia--      Imaging:    < from: NM GI Bleed Localization (NM GI Bleed Localization .) (23 @ 17:40) >  ACC: [id number] EXAM:  NM GI BLEEDING IMG   ORDERED BY: JAYLENE PARKS     PROCEDURE DATE:  2023          INTERPRETATION:  RADIOPHARMACEUTICAL:  20.0 mCi Tc-99m labeled autologous   red blood cells, I.V.    CLINICAL INFORMATION: GI Bleeding referred for localization of bleeding   site.    TECHNIQUE: Dynamic imaging of the abdomen and pelvis was performed for 2   hours following administration of radiolabeled autologous red blood   cells. Static images of the abdomen and pelvis in the anterior and   lateral views were obtained immediately thereafter. An Anterior view of   the head/neck was obtained for  purposes.    COMPARISON: None.    FINDINGS: There is physiologic distribution of radiolabeled red cells in   the abdomen and pelvis. There is no abnormal focus of increased   radiotracer activity to suggest the presence of an active bleeding site.    IMPRESSION:    No evidence of active bleeding site.     These results were discussed with DASIA Parks NP, by Dr. BARBRA Natarajan via   telephone with read back at about 6:00 PM on 2023.    --- End of Report ---            HAM NATARAJAN MD; Attending Nuclear Medicine  This document has been electronically signed. Aug 11 2023  6:01PM    < end of copied text >

## 2023-08-17 NOTE — PROGRESS NOTE ADULT - SUBJECTIVE AND OBJECTIVE BOX
date of service: 08-17-23 @ 09:07  afberile  REVIEW OF SYSTEMS:  CONSTITUTIONAL: No fever,  no  weight loss  ENT:  No  tinnitus,   no   vertigo  NECK: No pain or stiffness  RESPIRATORY: No cough, wheezing, chills or hemoptysis;    No Shortness of Breath  CARDIOVASCULAR: No chest pain, palpitations, dizziness  GASTROINTESTINAL: No abdominal or epigastric pain. No nausea, vomiting, or hematemesis; No diarrhea  No melena or hematochezia.  GENITOURINARY: No dysuria, frequency, hematuria, or incontinence  NEUROLOGICAL: No headaches  SKIN: No itching,  no   rash  LYMPH Nodes: No enlarged glands  ENDOCRINE: No heat or cold intolerance  MUSCULOSKELETAL: No joint pain or swelling  PSYCHIATRIC: No depression, anxiety  HEME/LYMPH: No easy bruising, or bleeding gums  ALLERGY AND IMMUNOLOGIC: No hives or eczema	    MEDICATIONS  (STANDING):  escitalopram 5 milliGRAM(s) Oral daily  heparin  Infusion 1200 Unit(s)/Hr (9.5 mL/Hr) IV Continuous <Continuous>  hydrocortisone hemorrhoidal Suppository 1 Suppository(s) Rectal two times a day  levothyroxine 137 MICROGram(s) Oral daily  mesalamine Suppository 1000 milliGRAM(s) Rectal at bedtime  metoprolol succinate  milliGRAM(s) Oral daily  midodrine. 10 milliGRAM(s) Oral three times a day  pantoprazole    Tablet 40 milliGRAM(s) Oral before breakfast  polyethylene glycol 3350 17 Gram(s) Oral daily  senna 2 Tablet(s) Oral at bedtime  simvastatin 40 milliGRAM(s) Oral at bedtime    MEDICATIONS  (PRN):  aluminum hydroxide/magnesium hydroxide/simethicone Suspension 30 milliLiter(s) Oral every 6 hours PRN Dyspepsia  midodrine. 20 milliGRAM(s) Oral <User Schedule> PRN PRIOR TO HD  sodium chloride 0.9% Bolus. 100 milliLiter(s) IV Bolus every 5 minutes PRN SBP LESS THAN or EQUAL to 90 mmHg      Vital Signs Last 24 Hrs  T(C): 36.4 (17 Aug 2023 05:15), Max: 36.9 (16 Aug 2023 19:55)  T(F): 97.5 (17 Aug 2023 05:15), Max: 98.5 (16 Aug 2023 19:55)  HR: 77 (17 Aug 2023 05:15) (62 - 77)  BP: 138/73 (17 Aug 2023 05:15) (108/64 - 138/73)  BP(mean): --  RR: 18 (17 Aug 2023 05:15) (18 - 18)  SpO2: 93% (17 Aug 2023 05:15) (93% - 100%)    Parameters below as of 17 Aug 2023 05:15  Patient On (Oxygen Delivery Method): nasal cannula      CAPILLARY BLOOD GLUCOSE        I&O's Summary    16 Aug 2023 07:01  -  17 Aug 2023 07:00  --------------------------------------------------------  IN: 700 mL / OUT: 2700 mL / NET: -2000 mL          Appearance: Normal	  HEENT:   Normal oral mucosa, PERRL, EOMI	  Lymphatic: No lymphadenopathy  Cardiovascular: Normal S1 S2, No JVD  Respiratory: Lungs clear to auscultation	  Gastrointestinal:  Soft, Non-tender, + BS	  Skin: No rash, No ecchymoses	  Extremities:     LABS:                        11.9   4.68  )-----------( 139      ( 16 Aug 2023 07:07 )             38.8     08-16    135  |  95<L>  |  22  ----------------------------<  86  3.3<L>   |  25  |  2.75<H>    Ca    10.1      16 Aug 2023 07:09  Phos  3.1     08-16  Mg     2.0     08-16      PTT - ( 16 Aug 2023 22:33 )  PTT:49.9 sec      Urinalysis Basic - ( 16 Aug 2023 07:09 )    Color: x / Appearance: x / SG: x / pH: x  Gluc: 86 mg/dL / Ketone: x  / Bili: x / Urobili: x   Blood: x / Protein: x / Nitrite: x   Leuk Esterase: x / RBC: x / WBC x   Sq Epi: x / Non Sq Epi: x / Bacteria: x                      Consultant(s) Notes Reviewed:      Care Discussed with Consultants/Other Providers:

## 2023-08-17 NOTE — PROGRESS NOTE ADULT - SUBJECTIVE AND OBJECTIVE BOX
Follow Up:  bacteremia    Interval History/ROS: pt afebrile, resolved abd pain, , cough or vomiting, repeat blood cx negative        Allergies  Myrbetriq (Hives)  tamsulosin (Hives)  alfuzosin (Hives)  levofloxacin (Hives)        ANTIMICROBIALS:  ertapenem  IVPB 500 every 24 hours      OTHER MEDS:  aluminum hydroxide/magnesium hydroxide/simethicone Suspension 30 milliLiter(s) Oral every 6 hours PRN  escitalopram 5 milliGRAM(s) Oral daily  heparin  Infusion 1200 Unit(s)/Hr IV Continuous <Continuous>  hydrocortisone hemorrhoidal Suppository 1 Suppository(s) Rectal two times a day  levothyroxine 137 MICROGram(s) Oral daily  mesalamine Suppository 1000 milliGRAM(s) Rectal at bedtime  metoprolol succinate  milliGRAM(s) Oral daily  midodrine. 10 milliGRAM(s) Oral three times a day  midodrine. 20 milliGRAM(s) Oral <User Schedule> PRN  pantoprazole    Tablet 40 milliGRAM(s) Oral before breakfast  polyethylene glycol 3350 17 Gram(s) Oral daily  senna 2 Tablet(s) Oral at bedtime  simvastatin 40 milliGRAM(s) Oral at bedtime  sodium chloride 0.9% Bolus. 100 milliLiter(s) IV Bolus every 5 minutes PRN      Vital Signs Last 24 Hrs  T(C): 36.6 (17 Aug 2023 16:22), Max: 36.9 (16 Aug 2023 19:55)  T(F): 97.8 (17 Aug 2023 16:22), Max: 98.5 (16 Aug 2023 19:55)  HR: 74 (17 Aug 2023 16:22) (62 - 96)  BP: 132/65 (17 Aug 2023 16:22) (108/64 - 138/73)  BP(mean): --  RR: 18 (17 Aug 2023 16:22) (18 - 18)  SpO2: 96% (17 Aug 2023 16:22) (93% - 100%)    Parameters below as of 17 Aug 2023 16:22  Patient On (Oxygen Delivery Method): nasal cannula  O2 Flow (L/min): 2      Physical Exam:  General:    NAD, non toxic  Respiratory:   comfortable on RA  abd:   soft, not tender  :     no CVAT, no suprapubic tenderness, no delgadillo  Musculoskeletal : no joint swelling, no edema  Skin:   petechial rash on torose and UE  vascular: R permacath                                     11.4   5.07  )-----------( 137      ( 17 Aug 2023 11:10 )             37.1       08-17    135  |  97  |  14  ----------------------------<  97  3.8   |  29  |  2.03<H>    Ca    9.9      17 Aug 2023 11:10  Phos  2.4     08-17  Mg     1.7     08-17        Urinalysis Basic - ( 17 Aug 2023 11:10 )    Color: x / Appearance: x / SG: x / pH: x  Gluc: 97 mg/dL / Ketone: x  / Bili: x / Urobili: x   Blood: x / Protein: x / Nitrite: x   Leuk Esterase: x / RBC: x / WBC x   Sq Epi: x / Non Sq Epi: x / Bacteria: x        MICROBIOLOGY:  v  .Blood Blood-Peripheral  08-14-23   No growth at 48 Hours  --  --      .Blood Blood-Peripheral  08-14-23   No growth at 48 Hours  --  --      .Blood Blood  08-13-23   Growth in aerobic and anaerobic bottles: Staphylococcus epidermidis  Coagulase Negative Staphylococci isolated from a single blood culture set  may represent contamination.  Contact the Microbiology Department at [phone] if susceptibility  testing is clinically indicated.  Direct identification is available within approximately 3-5  hours either by Blood Panel Multiplexed PCR or Direct  MALDI-TOF. Details: [email or web address]  --  Blood Culture PCR      Clean Catch Clean Catch (Midstream)  08-11-23   >100,000 CFU/ml Klebsiella pneumoniae ESBL  --  Klebsiella pneumoniae ESBL      .Blood Blood-Venous  08-11-23   Growth in anaerobic bottle: Clostridium cadaveris "Susceptibilities not  performed"  Direct identification is available within approximately 3-5  hours either by Blood Panel Multiplexed PCR or Direct  MALDI-TOF. Details: [email or web address]  --  Blood Culture PCR      .Blood Blood-Venous  08-11-23   Growth in anaerobic bottle: Gram Positive Rods  Direct identification is available within approximately 3-5  hours either by Blood Panel Multiplexed PCR or Direct  MALDI-TOF. Details: [email or web address]  --  Blood Culture PCR          Rapid RVP Result: Giovannac (08-11 @ 01:27)        RADIOLOGY:  Images independently visualized and reviewed personally, findings as below  < from: NM GI Bleed Localization (NM GI Bleed Localization .) (08.11.23 @ 17:40) >  IMPRESSION:    No evidence of active bleeding site.      < end of copied text >  < from: CT Abdomen and Pelvis w/ IV Cont (08.11.23 @ 04:32) >  IMPRESSION:    Abundant fecal material within the rectum with mild wall thickening and   trace surrounding stranding. Recommend clinical correlation to assess for   stercoral colitis. No bowel obstruction.    Cholelithiasis.    Findings of pulmonary edema with moderate right and small left pleural   effusion.    Additional findings mentioned above.    < end of copied text >

## 2023-08-18 LAB
ANION GAP SERPL CALC-SCNC: 10 MMOL/L — SIGNIFICANT CHANGE UP (ref 5–17)
APTT BLD: 49.3 SEC — HIGH (ref 24.5–35.6)
APTT BLD: 56.7 SEC — HIGH (ref 24.5–35.6)
APTT BLD: 61.2 SEC — HIGH (ref 24.5–35.6)
APTT BLD: 84.2 SEC — HIGH (ref 24.5–35.6)
BUN SERPL-MCNC: 25 MG/DL — HIGH (ref 7–23)
CALCIUM SERPL-MCNC: 9.8 MG/DL — SIGNIFICANT CHANGE UP (ref 8.4–10.5)
CHLORIDE SERPL-SCNC: 99 MMOL/L — SIGNIFICANT CHANGE UP (ref 96–108)
CO2 SERPL-SCNC: 26 MMOL/L — SIGNIFICANT CHANGE UP (ref 22–31)
CREAT SERPL-MCNC: 3.02 MG/DL — HIGH (ref 0.5–1.3)
EGFR: 21 ML/MIN/1.73M2 — LOW
GLUCOSE SERPL-MCNC: 85 MG/DL — SIGNIFICANT CHANGE UP (ref 70–99)
HCT VFR BLD CALC: 36.1 % — LOW (ref 39–50)
HGB BLD-MCNC: 11.1 G/DL — LOW (ref 13–17)
MCHC RBC-ENTMCNC: 28.5 PG — SIGNIFICANT CHANGE UP (ref 27–34)
MCHC RBC-ENTMCNC: 30.7 GM/DL — LOW (ref 32–36)
MCV RBC AUTO: 92.6 FL — SIGNIFICANT CHANGE UP (ref 80–100)
NRBC # BLD: 0 /100 WBCS — SIGNIFICANT CHANGE UP (ref 0–0)
PLATELET # BLD AUTO: 136 K/UL — LOW (ref 150–400)
POTASSIUM SERPL-MCNC: 3.9 MMOL/L — SIGNIFICANT CHANGE UP (ref 3.5–5.3)
POTASSIUM SERPL-SCNC: 3.9 MMOL/L — SIGNIFICANT CHANGE UP (ref 3.5–5.3)
RBC # BLD: 3.9 M/UL — LOW (ref 4.2–5.8)
RBC # FLD: 15.6 % — HIGH (ref 10.3–14.5)
SODIUM SERPL-SCNC: 135 MMOL/L — SIGNIFICANT CHANGE UP (ref 135–145)
WBC # BLD: 5.45 K/UL — SIGNIFICANT CHANGE UP (ref 3.8–10.5)
WBC # FLD AUTO: 5.45 K/UL — SIGNIFICANT CHANGE UP (ref 3.8–10.5)

## 2023-08-18 PROCEDURE — 99232 SBSQ HOSP IP/OBS MODERATE 35: CPT

## 2023-08-18 PROCEDURE — 99222 1ST HOSP IP/OBS MODERATE 55: CPT | Mod: FS

## 2023-08-18 PROCEDURE — 99232 SBSQ HOSP IP/OBS MODERATE 35: CPT | Mod: GC

## 2023-08-18 RX ORDER — LISINOPRIL 2.5 MG/1
2.5 TABLET ORAL DAILY
Refills: 0 | Status: DISCONTINUED | OUTPATIENT
Start: 2023-08-18 | End: 2023-08-24

## 2023-08-18 RX ADMIN — HEPARIN SODIUM 9 UNIT(S)/HR: 5000 INJECTION INTRAVENOUS; SUBCUTANEOUS at 17:30

## 2023-08-18 RX ADMIN — MIDODRINE HYDROCHLORIDE 10 MILLIGRAM(S): 2.5 TABLET ORAL at 13:08

## 2023-08-18 RX ADMIN — Medication 137 MICROGRAM(S): at 06:41

## 2023-08-18 RX ADMIN — HEPARIN SODIUM 9 UNIT(S)/HR: 5000 INJECTION INTRAVENOUS; SUBCUTANEOUS at 02:36

## 2023-08-18 RX ADMIN — HEPARIN SODIUM 9.5 UNIT(S)/HR: 5000 INJECTION INTRAVENOUS; SUBCUTANEOUS at 17:41

## 2023-08-18 RX ADMIN — MIDODRINE HYDROCHLORIDE 10 MILLIGRAM(S): 2.5 TABLET ORAL at 06:41

## 2023-08-18 RX ADMIN — ERTAPENEM SODIUM 100 MILLIGRAM(S): 1 INJECTION, POWDER, LYOPHILIZED, FOR SOLUTION INTRAMUSCULAR; INTRAVENOUS at 22:35

## 2023-08-18 RX ADMIN — MIDODRINE HYDROCHLORIDE 10 MILLIGRAM(S): 2.5 TABLET ORAL at 17:19

## 2023-08-18 RX ADMIN — LISINOPRIL 2.5 MILLIGRAM(S): 2.5 TABLET ORAL at 13:08

## 2023-08-18 RX ADMIN — ESCITALOPRAM OXALATE 5 MILLIGRAM(S): 10 TABLET, FILM COATED ORAL at 13:07

## 2023-08-18 RX ADMIN — HEPARIN SODIUM 9 UNIT(S)/HR: 5000 INJECTION INTRAVENOUS; SUBCUTANEOUS at 07:17

## 2023-08-18 RX ADMIN — SIMVASTATIN 40 MILLIGRAM(S): 20 TABLET, FILM COATED ORAL at 22:35

## 2023-08-18 RX ADMIN — PANTOPRAZOLE SODIUM 40 MILLIGRAM(S): 20 TABLET, DELAYED RELEASE ORAL at 06:41

## 2023-08-18 NOTE — PROGRESS NOTE ADULT - ATTENDING COMMENTS
# ESRD- dialysis-dependent since April 10, 2023. (MWF) schedule.  NO substantial recovery for > 3 months. LIkely progressed to ESRD.   Discussed with patient about long-term vascular access planning. For vein mapping while he is here. If patient is still here next week, to consult vascular sx for AVF/AVG planning.     # Volume overload - UF 1.5 liters today.     # Medication toxicity monitoring: medication dose reviewed. Please dose medications to CrCl<15    The rest of the recommendations as per fellow's note.    Xochitl Penny MD  Attending Nephrologist  [phone] or via [employer] .

## 2023-08-18 NOTE — PROGRESS NOTE ADULT - ASSESSMENT
79 m with  A-fib, CAD, MS s/p CABG and MVR, BPH, ESRD on HD via R permacath, recent MSSA bacteremia 3/49912 s/p PPM extraction and Micra now p/w rectal bleed and loss of appetite, no fever but stated that has had dysuria for a while I  here afebrile, WBC: 7 Hgb: 12  u/a positive  CT: Abundant fecal material within the rectum with mild wall thickening and trace surrounding stranding. Recommend clinical correlation to assess for stercoral colitis. No bowel obstruction. Cholelithiasis. Findings of pulmonary edema with moderate right and small left pleural   effusion.    rectal bleed, stool impaction and stercoral colitis and clostridium cadaveris bacteremia s/p disimpaction  dysuria and positive u/a, UTI, no fever or WBC, now urine cx with ESBL klebsiella, has h/p MVR, MSSA bacteremia 3/2023 and had negative ERENDIRA, PPM extracted and s/p micra  repeat blood cx 8/13 with staph epi which is likely a contaminant as pt is afebrile and doing well, repeat cx negative    * c/w ertapenem 500 qd, will complete a 10 day course for bacteremia, on discharge switch to PO flaygl 500 q 8 through 8/22  * rectal bleed management as per the primary team  * will sign off, please call with questions    The above assessment and plan was discussed with the primary team    Evelin Grove MD  contact on teams  After 5pm and on weekends call [phone]

## 2023-08-18 NOTE — CONSULT NOTE ADULT - ASSESSMENT
A 79 y.o. M with PMHx Significant for HLD, A-fib, BPH, ESRD, CANDY, who presents to the ED with c.o. abd pain, rectal pain, and bloody stools.  Patient is on dialysis MWF. Vascular surgery is consulted for AVF creation.     Recommendations:  - Please obtain bilateral vein mapping.  - Please document medical and cardiac clearance and risk stratifications.  - Please protect the ....... arm (NO BP cuffs, NO IVs, NO blood withdrawals)  - Rest of care per primary team    Vascular Surgery  [id number]     A 79 y.o. M with PMHx Significant for HLD, A-fib, BPH, ESRD, CANDY, who presents to the ED with c.o. abd pain, rectal pain, and bloody stools.  Patient is on dialysis MWF. Vascular surgery is consulted for AVF creation. Patient endorses having a pacemaker.    Recommendations:  - Please obtain bilateral vein mapping.  - Please document medical and cardiac clearance and risk stratifications.  - Please protect the Right arm (NO BP cuffs, NO IVs, NO blood withdrawals)  - Rest of care per primary team    Vascular Surgery  [id number]

## 2023-08-18 NOTE — CONSULT NOTE ADULT - ATTENDING COMMENTS
vein mapping and fistula planning
Maeve Washington MD  Off: [phone]  contact me on teams    (After 5 pm or on weekends please page the on-call fellow/attending, can check [email or web address] for schedule. Login is sunil aguirre, schedule under [facility] medicine, psych, derm)

## 2023-08-18 NOTE — PROGRESS NOTE ADULT - SUBJECTIVE AND OBJECTIVE BOX
Date of Service: 08-18-23 @ 07:32           CARDIOLOGY     PROGRESS  NOTE   ________________________________________________    CHIEF COMPLAINT:Patient is a 78y old  Male who presents with a chief complaint of abdominal pain/ rectal bleed (18 Aug 2023 07:09)  no complain, doing well  	  REVIEW OF SYSTEMS:  CONSTITUTIONAL: No fever, weight loss, or fatigue  EYES: No eye pain, visual disturbances, or discharge  ENT:  No difficulty hearing, tinnitus, vertigo; No sinus or throat pain  NECK: No pain or stiffness  RESPIRATORY: No cough, wheezing, chills or hemoptysis; No Shortness of Breath  CARDIOVASCULAR: No chest pain, palpitations, passing out, dizziness, or leg swelling  GASTROINTESTINAL: No abdominal or epigastric pain. No nausea, vomiting, or hematemesis; No diarrhea or constipation. No melena or hematochezia.  GENITOURINARY: No dysuria, frequency, hematuria, or incontinence  NEUROLOGICAL: No headaches, memory loss, loss of strength, numbness, or tremors  SKIN: No itching, burning, rashes, or lesions   LYMPH Nodes: No enlarged glands  ENDOCRINE: No heat or cold intolerance; No hair loss  MUSCULOSKELETAL: No joint pain or swelling; No muscle, back, or extremity pain  PSYCHIATRIC: No depression, anxiety, mood swings, or difficulty sleeping  HEME/LYMPH: No easy bruising, or bleeding gums  ALLERGY AND IMMUNOLOGIC: No hives or eczema	    [x ] All others negative	  [ ] Unable to obtain    PHYSICAL EXAM:  T(C): 36.4 (08-18-23 @ 06:35), Max: 37 (08-17-23 @ 19:57)  HR: 57 (08-18-23 @ 06:35) (57 - 96)  BP: 125/69 (08-18-23 @ 06:35) (123/68 - 155/72)  RR: 18 (08-18-23 @ 06:35) (18 - 18)  SpO2: 97% (08-18-23 @ 06:35) (94% - 97%)  Wt(kg): --  I&O's Summary    17 Aug 2023 07:01  -  18 Aug 2023 07:00  --------------------------------------------------------  IN: 553 mL / OUT: 350 mL / NET: 203 mL        Appearance: Normal	  HEENT:   Normal oral mucosa, PERRL, EOMI	  Lymphatic: No lymphadenopathy  Cardiovascular: Normal S1 S2, No JVD, + murmurs, No edema  Respiratory:RHONCHI	  Psychiatry: A & O x 3, Mood & affect appropriate  Gastrointestinal:  Soft, Non-tender, + BS	  Skin: No rashes, No ecchymoses, No cyanosis	  Neurologic: Non-focal  Extremities: Normal range of motion, No clubbing, cyanosis or edema  Vascular: Peripheral pulses palpable 2+ bilaterally    MEDICATIONS  (STANDING):  ertapenem  IVPB 500 milliGRAM(s) IV Intermittent every 24 hours  escitalopram 5 milliGRAM(s) Oral daily  heparin  Infusion 1200 Unit(s)/Hr (9 mL/Hr) IV Continuous <Continuous>  hydrocortisone hemorrhoidal Suppository 1 Suppository(s) Rectal two times a day  levothyroxine 137 MICROGram(s) Oral daily  mesalamine Suppository 1000 milliGRAM(s) Rectal at bedtime  metoprolol succinate  milliGRAM(s) Oral daily  midodrine. 10 milliGRAM(s) Oral three times a day  pantoprazole    Tablet 40 milliGRAM(s) Oral before breakfast  polyethylene glycol 3350 17 Gram(s) Oral daily  senna 2 Tablet(s) Oral at bedtime  simvastatin 40 milliGRAM(s) Oral at bedtime      TELEMETRY: 	    ECG:  	  RADIOLOGY:  OTHER: 	  	  LABS:	 	    CARDIAC MARKERS:                                11.4   5.07  )-----------( 137      ( 17 Aug 2023 11:10 )             37.1     08-17    135  |  97  |  14  ----------------------------<  97  3.8   |  29  |  2.03<H>    Ca    9.9      17 Aug 2023 11:10  Phos  2.4     08-17  Mg     1.7     08-17      proBNP:   Lipid Profile:   HgA1c:   TSH:   PTT - ( 18 Aug 2023 01:50 )  PTT:84.2 sec      rectal bleed, stool impaction and stercoral colitis and clostridium cadaveris bacteremia s/p disimpaction  dysuria and positive u/a, UTI, no fever or WBC, now urine cx with ESBL klebsiella, has h/p MVR, MSSA bacteremia 3/2023 and had negative ERENDIRA, PPM extracted and s/p micra  repeat blood cx 8/13 with staph epi which is likely a contaminant as pt is afebrile and doing well, repeat cx negative    * c/w ertapenem 500 qd, will complete a 10 day course for bacteremia, on discharge switch to PO flaygl 500 q 8 through 8/22  * rectal bleed management as per the primary team    Assessment and plan  ---------------------------  Liam Garza is 79 y.o. M with PMHx Significant for HLD, A-fib, BPH, ESRD, CANDY, who presents to the ED with c.o. abd pain, rectal pain, and bloody stools.  Per patient, he has been experiencing abd pain and rectal bleeding onset 1 day ago.  Patient notes pain is localized to the suprapubic area, radiates to the RLQ.  He notes pain is intermittent and has had minimal improvement with Tylenol.  Nothing seems to exacerbate his pain.  His only other associated symptom is chills.  Otherwise he denies fevers, N/V, HA, changes in his vision, sore throat, SOB, CP, diarrhea or constipation. Blood per rectum is accumulated in his depends. Of note, patient notes rectal pain began in May which she describes as sharp and has been constant since onset.  pt with hx of chronic a.fib, chf systolic EF 35 to 40% s/p ppm ,MICRA sec to ppm exrtacction sec to + BC.  sob sec acute on chronic chf systolc, fluid overload  renal eval possible HD today  ?rectal bleed hold AC, GI eval  ASHD / A. FIb continue beta blocker   repeat echo with hx of pericardial effusion  clear liquid diet  ct ?colitis/ + ua start on abx/ ID noted, awaiting culture  + BC ID follow up discussed with ACP  GI/ MED appreciated  pulmonary edema/ chf, repeat echo check MV, continue HD with increase fluid withdrawal as tolerated  abx switches do Zosyn, ID appreciated  awaiting TTE ?today  will add ace if bp can tolerate  started on iv heparin yesterday, fu hgb, if no bleeding will switch to NOAC  fu hgb   advance diet as  tolerated  doing better, oob to chair/ physical therapy  blood cultures are still +, ID appreciated awaiting ECHO concern about bioprosthetic MV, may need ERENDIRA  doing much better clinically, may switch iv heparin to NOAC if no procedure needed, hgb stable  ID / renal appreciated, ?vascular for AV fistulae placement, but bacteriemia needs to be resolved  dc planning after total of 10 days of abx  add lisinopril 2.5 mg daily  if no improvement in Ef pt will need AICD in 3 months as per primary prevention

## 2023-08-18 NOTE — PROGRESS NOTE ADULT - SUBJECTIVE AND OBJECTIVE BOX
Follow Up:  bacteremia    Interval History/ROS: pt afebrile, resolved abd pain, cough or vomiting          Allergies  Myrbetriq (Hives)  tamsulosin (Hives)  alfuzosin (Hives)  levofloxacin (Hives)        ANTIMICROBIALS:  ertapenem  IVPB 500 every 24 hours      OTHER MEDS:  aluminum hydroxide/magnesium hydroxide/simethicone Suspension 30 milliLiter(s) Oral every 6 hours PRN  escitalopram 5 milliGRAM(s) Oral daily  heparin  Infusion 1200 Unit(s)/Hr IV Continuous <Continuous>  hydrocortisone hemorrhoidal Suppository 1 Suppository(s) Rectal two times a day  levothyroxine 137 MICROGram(s) Oral daily  lisinopril 2.5 milliGRAM(s) Oral daily  mesalamine Suppository 1000 milliGRAM(s) Rectal at bedtime  metoprolol succinate  milliGRAM(s) Oral daily  midodrine. 10 milliGRAM(s) Oral three times a day  midodrine. 20 milliGRAM(s) Oral <User Schedule> PRN  pantoprazole    Tablet 40 milliGRAM(s) Oral before breakfast  polyethylene glycol 3350 17 Gram(s) Oral daily  senna 2 Tablet(s) Oral at bedtime  simvastatin 40 milliGRAM(s) Oral at bedtime  sodium chloride 0.9% Bolus. 100 milliLiter(s) IV Bolus every 5 minutes PRN      Vital Signs Last 24 Hrs  T(C): 36.6 (18 Aug 2023 16:21), Max: 37 (17 Aug 2023 19:57)  T(F): 97.8 (18 Aug 2023 16:21), Max: 98.6 (17 Aug 2023 19:57)  HR: 81 (18 Aug 2023 16:21) (57 - 86)  BP: 130/62 (18 Aug 2023 16:21) (125/69 - 158/79)  BP(mean): --  RR: 18 (18 Aug 2023 16:21) (18 - 18)  SpO2: 98% (18 Aug 2023 16:21) (94% - 98%)    Parameters below as of 18 Aug 2023 16:21  Patient On (Oxygen Delivery Method): nasal cannula  O2 Flow (L/min): 2      Physical Exam:  General:    NAD, non toxic  Respiratory:   comfortable on RA  abd:   soft, not tender  :     no CVAT, no suprapubic tenderness, no delgadillo  Musculoskeletal : no joint swelling, no edema  Skin:   petechial rash on torose and UE  vascular: R permacath                          11.1   5.45  )-----------( 136      ( 18 Aug 2023 07:40 )             36.1       08-18    135  |  99  |  25<H>  ----------------------------<  85  3.9   |  26  |  3.02<H>    Ca    9.8      18 Aug 2023 07:40  Phos  2.4     08-17  Mg     1.7     08-17        Urinalysis Basic - ( 18 Aug 2023 07:40 )    Color: x / Appearance: x / SG: x / pH: x  Gluc: 85 mg/dL / Ketone: x  / Bili: x / Urobili: x   Blood: x / Protein: x / Nitrite: x   Leuk Esterase: x / RBC: x / WBC x   Sq Epi: x / Non Sq Epi: x / Bacteria: x        MICROBIOLOGY:  v  .Blood Blood-Peripheral  08-14-23   No growth at 72 Hours  --  --      .Blood Blood-Peripheral  08-14-23   No growth at 72 Hours  --  --      .Blood Blood  08-13-23   Growth in aerobic and anaerobic bottles: Staphylococcus epidermidis  Coagulase Negative Staphylococci isolated from a single blood culture set  may represent contamination.  Contact the Microbiology Department at [phone] if susceptibility  testing is clinically indicated.  Direct identification is available within approximately 3-5  hours either by Blood Panel Multiplexed PCR or Direct  MALDI-TOF. Details: [email or web address]  --  Blood Culture PCR      Clean Catch Clean Catch (Midstream)  08-11-23   >100,000 CFU/ml Klebsiella pneumoniae ESBL  --  Klebsiella pneumoniae ESBL      .Blood Blood-Venous  08-11-23   Growth in anaerobic bottle: Clostridium cadaveris "Susceptibilities not  performed"  Direct identification is available within approximately 3-5  hours either by Blood Panel Multiplexed PCR or Direct  MALDI-TOF. Details: [email or web address]  --  Blood Culture PCR      .Blood Blood-Venous  08-11-23   Growth in anaerobic bottle: Gram Positive Rods  Direct identification is available within approximately 3-5  hours either by Blood Panel Multiplexed PCR or Direct  MALDI-TOF. Details: [email or web address]  --  Blood Culture PCR                RADIOLOGY:  Images independently visualized and reviewed personally, findings as below  < from: NM GI Bleed Localization (NM GI Bleed Localization .) (08.11.23 @ 17:40) >  IMPRESSION:    No evidence of active bleeding site.     These results were discussed with DASIA Parks NP, by Dr. BARBRA Natarajan via   telephone with read back at about 6:00 PM on 8/11/2023.    < end of copied text >  < from: CT Abdomen and Pelvis w/ IV Cont (08.11.23 @ 04:32) >  Cholelithiasis.    Findings of pulmonary edema with moderate right and small left pleural   effusion.    Additional findings mentioned above.    < end of copied text >

## 2023-08-18 NOTE — PROGRESS NOTE ADULT - PROBLEM SELECTOR PLAN 3
Mild Anemia Hgb remains >10  Work up per primary team Mild Anemia Hgb remains >10  Work up per primary team    Thank you for this consult.  Saman Black  Nephrology Fellow  Please contact me on TEAMS  After 5 pm please contact the on-call Fellow.

## 2023-08-18 NOTE — CONSULT NOTE ADULT - ASSESSMENT
A 79 y.o. M with PMHx Significant for HLD, A-fib, BPH, ESRD, CANDY, who presents to the ED with c.o. abd pain, rectal pain, and bloody stools.  Patient is on dialysis MWF. Vascular surgery is consulted for AVF creation.     Recommendations:  - Please obtain bilateral vein mapping.  - Please document medical and cardiac clearance and risk stratifications.  - Please protect the ....... arm (NO BP cuffs, NO IVs, NO blood withdrawals)  - Rest of care per primary team    Vascular Surgery  [id number]

## 2023-08-18 NOTE — PROGRESS NOTE ADULT - ASSESSMENT
BEULAH on HD (M/W/F) via [facility], had extra UF session for volume overload, now back to MWF. 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      BEULAH on HD ([facility]) R-PC BEULAH on HD (M/W/F) via [facility], had extra UF session for volume overload, now back to MWF. 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      BEULAH on HD ([facility]) R-PC  Currently on 2L O2

## 2023-08-18 NOTE — PROGRESS NOTE ADULT - ASSESSMENT
78  year old male    h/o  CAD s/p stent , asa.     A-fib/ PPM, , hypothyroid, and total left knee replacement    prior ppm  interrogation  with  WCT   Ct chest, retrosternal hematoma.  mod left pl effusion/ has   c/c leg  pain/  h/o  non complaince  with meds       admitted  with  abd pain and rectal bleeding onset 1 day ago.. arrive s form n home     has  no pain  now    c/c   systolic  and  diastolic  chf,,  cardiomyopathy,. Mitral  valvuloplasty ,  h/o  c/c  l/edema  of L  leg    h/o    c/c AFIB,   has  PPM         eliquis   on  hold  now      h/o c/c   Anemia, , hb is  12.  gi   known to  dr joann INFANTE,     cath, with 2 vessel disease,  s/p  CABG and  MVR  surg d r marni      echo,  on 7/2022,  ef  35/ new/ severe  TR,  and ,  cath,   was non  obstructive     s/p   MSSA  bacterinia , in march 2023, completed  iv ancef..    s/p   explant pf  ppm  and  Micra  placement on  3/30/23. dr sunitha rocha      Echo,  3/2023,  ef  25,  mod  AI. severe  TR . RV hypokinesis    BEULAH, from  [facility], dx  recently , ancef / was  switched   to iv vanco,   CKD, on HD,  has  right Permcath.house  renal   s/p  renal bx on 4/ 14.. 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 + , Ig A  Lamda. ,  known to heme  dr dobson.  pe r heme,  no  need  for  b marrow  bx    cxr/  CT chest,  pl  effusion,  will need  HD  gi    f/p,  no  bleeding  now/  nuclear  scan, no  bleeding   bacteremia   Clostridium cadeveris,  iv   ertapenem   rpt  bcx   8/13, Staph  epi/  need  discussion., ? contamianant,  given  priro   history  of bacteremia   has Micra/  MVR  rpt bc 8/14,  thus  far, is  negative    pe r iD.  plan 10  days  of  iv Ertapenem  then  po  flagyl  on iv heparin/  vascular f/p                   card /  dr mikayla dick       < from: Intra-Operative Transesophageal Echo W or WO Ultrasound Enhancing Agent (03.30.23 @ 12:48) >  --------------------------------------------------------------------------------  PRE-BYPASS FINDINGS  Left Ventricle:  Left ventricular ejection fraction is estimated at 25 to 30%. There is severe kypokinesis in the left ventricle.  Right Ventricle:  Moderately reduced systolic function. Moderately reduced systolic function. There is moderate hypokinesis in the right ventricle.  Left Atrium:  Diffuse smoke visualized in LA.  Aortic Valve:  The aortic valve appears trileaflet. There is moderate aortic regurgitation.  No obvious vegetations visualized  Mitral Valve:  Bioprosthetic valve is present in the mitral position. There is calcification of the mitral valve annulus.  No obvious vegetations visualized.  Tricuspid Valve:  There is moderate-severe tricuspid regurgitation. TR unchanged following lead extraction.  Pulmonic Valve:  There is mild pulmonic regurgitation.  Pericardium:  No pericardial effusion seen. No pericardial effusion visualized before and after lead extraction.  Electronically signed by Jimmy Gambino on 3/30/2023 at 3:00:58 PM  *** Final ***  < end of copied text >

## 2023-08-18 NOTE — PROGRESS NOTE ADULT - SUBJECTIVE AND OBJECTIVE BOX
date of service: 08-18-23 @ 09:35  afberile  REVIEW OF SYSTEMS:  CONSTITUTIONAL: No fever,  no  weight loss  ENT:  No  tinnitus,   no   vertigo  NECK: No pain or stiffness  RESPIRATORY: No cough, wheezing, chills or hemoptysis;    No Shortness of Breath  CARDIOVASCULAR: No chest pain, palpitations, dizziness  GASTROINTESTINAL: No abdominal or epigastric pain. No nausea, vomiting, or hematemesis; No diarrhea  No melena or hematochezia.  GENITOURINARY: No dysuria, frequency, hematuria, or incontinence  NEUROLOGICAL: No headaches  SKIN: No itching,  no   rash  LYMPH Nodes: No enlarged glands  ENDOCRINE: No heat or cold intolerance  MUSCULOSKELETAL: No joint pain or swelling  PSYCHIATRIC: No depression, anxiety  HEME/LYMPH: No easy bruising, or bleeding gums  ALLERGY AND IMMUNOLOGIC: No hives or eczema	    MEDICATIONS  (STANDING):  ertapenem  IVPB 500 milliGRAM(s) IV Intermittent every 24 hours  escitalopram 5 milliGRAM(s) Oral daily  heparin  Infusion 1200 Unit(s)/Hr (9 mL/Hr) IV Continuous <Continuous>  hydrocortisone hemorrhoidal Suppository 1 Suppository(s) Rectal two times a day  levothyroxine 137 MICROGram(s) Oral daily  lisinopril 2.5 milliGRAM(s) Oral daily  mesalamine Suppository 1000 milliGRAM(s) Rectal at bedtime  metoprolol succinate  milliGRAM(s) Oral daily  midodrine. 10 milliGRAM(s) Oral three times a day  pantoprazole    Tablet 40 milliGRAM(s) Oral before breakfast  polyethylene glycol 3350 17 Gram(s) Oral daily  senna 2 Tablet(s) Oral at bedtime  simvastatin 40 milliGRAM(s) Oral at bedtime    MEDICATIONS  (PRN):  aluminum hydroxide/magnesium hydroxide/simethicone Suspension 30 milliLiter(s) Oral every 6 hours PRN Dyspepsia  midodrine. 20 milliGRAM(s) Oral <User Schedule> PRN PRIOR TO HD  sodium chloride 0.9% Bolus. 100 milliLiter(s) IV Bolus every 5 minutes PRN SBP LESS THAN or EQUAL to 90 mmHg      Vital Signs Last 24 Hrs  T(C): 36.4 (18 Aug 2023 09:00), Max: 37 (17 Aug 2023 19:57)  T(F): 97.5 (18 Aug 2023 09:00), Max: 98.6 (17 Aug 2023 19:57)  HR: 58 (18 Aug 2023 09:00) (57 - 96)  BP: 158/79 (18 Aug 2023 09:00) (123/68 - 158/79)  BP(mean): --  RR: 18 (18 Aug 2023 09:00) (18 - 18)  SpO2: 96% (18 Aug 2023 09:00) (94% - 97%)    Parameters below as of 18 Aug 2023 09:00  Patient On (Oxygen Delivery Method): nasal cannula  O2 Flow (L/min): 2    CAPILLARY BLOOD GLUCOSE        I&O's Summary    17 Aug 2023 07:01  -  18 Aug 2023 07:00  --------------------------------------------------------  IN: 553 mL / OUT: 350 mL / NET: 203 mL          Appearance: Normal	  HEENT:   Normal oral mucosa, PERRL, EOMI	  Lymphatic: No lymphadenopathy  Cardiovascular: Normal S1 S2, No JVD  Respiratory: Lungs clear to auscultation	  Gastrointestinal:  Soft, Non-tender, + BS	  Skin: No rash, No ecchymoses	  Extremities:     LABS:                        11.1   5.45  )-----------( 136      ( 18 Aug 2023 07:40 )             36.1     08-18    135  |  99  |  25<H>  ----------------------------<  85  3.9   |  26  |  3.02<H>    Ca    9.8      18 Aug 2023 07:40  Phos  2.4     08-17  Mg     1.7     08-17      PTT - ( 18 Aug 2023 08:42 )  PTT:61.2 sec      Urinalysis Basic - ( 18 Aug 2023 07:40 )    Color: x / Appearance: x / SG: x / pH: x  Gluc: 85 mg/dL / Ketone: x  / Bili: x / Urobili: x   Blood: x / Protein: x / Nitrite: x   Leuk Esterase: x / RBC: x / WBC x   Sq Epi: x / Non Sq Epi: x / Bacteria: x                      Consultant(s) Notes Reviewed:      Care Discussed with Consultants/Other Providers:

## 2023-08-18 NOTE — PROGRESS NOTE ADULT - SUBJECTIVE AND OBJECTIVE BOX
[facility] DIVISION OF KIDNEY DISEASES AND HYPERTENSION   FOLLOW UP NOTE    --------------------------------------------------------------------------------  Chief Complaint:    24 hour events/subjective: Pt. was seen and examined today. Overnight events  noted.       PAST HISTORY  --------------------------------------------------------------------------------  No significant changes to PMH, PSH, FHx, SHx, unless otherwise noted    ALLERGIES & MEDICATIONS  --------------------------------------------------------------------------------  Allergies    Myrbetriq (Hives)  tamsulosin (Hives)  alfuzosin (Hives)  levofloxacin (Hives)    Intolerances      Standing Inpatient Medications  ertapenem  IVPB 500 milliGRAM(s) IV Intermittent every 24 hours  escitalopram 5 milliGRAM(s) Oral daily  heparin  Infusion 1200 Unit(s)/Hr IV Continuous <Continuous>  hydrocortisone hemorrhoidal Suppository 1 Suppository(s) Rectal two times a day  levothyroxine 137 MICROGram(s) Oral daily  mesalamine Suppository 1000 milliGRAM(s) Rectal at bedtime  metoprolol succinate  milliGRAM(s) Oral daily  midodrine. 10 milliGRAM(s) Oral three times a day  pantoprazole    Tablet 40 milliGRAM(s) Oral before breakfast  polyethylene glycol 3350 17 Gram(s) Oral daily  senna 2 Tablet(s) Oral at bedtime  simvastatin 40 milliGRAM(s) Oral at bedtime    PRN Inpatient Medications  aluminum hydroxide/magnesium hydroxide/simethicone Suspension 30 milliLiter(s) Oral every 6 hours PRN  midodrine. 20 milliGRAM(s) Oral <User Schedule> PRN  sodium chloride 0.9% Bolus. 100 milliLiter(s) IV Bolus every 5 minutes PRN      REVIEW OF SYSTEMS  --------------------------------------------------------------------------------  Gen: No fevers/chills  Head/Eyes/Ears: No HA   Respiratory: No dyspnea, cough  CV: No chest pain  GI: No abdominal pain, diarrhea  : No dysuria, hematuria  MSK: No  edema  Skin: No rashes  Heme: No easy bruising or bleeding    All other systems were reviewed and are negative, except as noted.    VITALS/PHYSICAL EXAM  --------------------------------------------------------------------------------  T(C): 36.4 (08-18-23 @ 06:35), Max: 37 (08-17-23 @ 19:57)  HR: 57 (08-18-23 @ 06:35) (57 - 96)  BP: 125/69 (08-18-23 @ 06:35) (123/68 - 155/72)  RR: 18 (08-18-23 @ 06:35) (18 - 18)  SpO2: 97% (08-18-23 @ 06:35) (94% - 97%)  Wt(kg): --        08-17-23 @ 07:01  -  08-18-23 @ 07:00  --------------------------------------------------------  IN: 553 mL / OUT: 350 mL / NET: 203 mL        Physical Exam:  	Gen: NAD  	HEENT: Anicteric  	Pulm: scattered bibasilar crackles  	CV: S1S2+  	Abd: Soft, +BS   	Ext: No LE edema B/L  	Neuro: Awake          : Knapp cath+ with clear urine   	Skin: Warm and dry  	Dialysis access: R-PC      LABS/STUDIES  --------------------------------------------------------------------------------              11.4   5.07  >-----------<  137      [08-17-23 @ 11:10]              37.1     135  |  97  |  14  ----------------------------<  97      [08-17-23 @ 11:10]  3.8   |  29  |  2.03        Ca     9.9     [08-17-23 @ 11:10]      Mg     1.7     [08-17-23 @ 11:10]      Phos  2.4     [08-17-23 @ 11:10]        PTT: 84.2       [08-18-23 @ 01:50]      Creatinine Trend:  SCr 2.03 [08-17 @ 11:10]  SCr 2.75 [08-16 @ 07:09]  SCr 3.46 [08-14 @ 07:30]  SCr 2.78 [08-13 @ 06:43]  SCr 2.36 [08-11 @ 21:12]    Urinalysis - [08-17-23 @ 11:10]      Color  / Appearance  / SG  / pH       Gluc 97 / Ketone   / Bili  / Urobili        Blood  / Protein  / Leuk Est  / Nitrite       RBC  / WBC  / Hyaline  / Gran  / Sq Epi  / Non Sq Epi  / Bacteria       HBsAb <3.0      [08-11-23 @ 21:13]  HBsAg Nonreact      [08-11-23 @ 21:13]  HBcAb Nonreact      [08-11-23 @ 21:13]      Tacrolimus  Cyclosporine  Sirolimus  Mycophenolate  BK PCR  CMV PCR  Parvo PCR  EBV PCR

## 2023-08-18 NOTE — PROGRESS NOTE ADULT - PROBLEM SELECTOR PLAN 2
Pt has responded well to previous UF sessions in between regular HD days  Now will continue with HD per schedule (MWF) Pt has responded well to previous UF sessions in between regular HD days  Now will continue with HD per schedule (MWF)  Plan for HD session today

## 2023-08-18 NOTE — PROGRESS NOTE ADULT - SUBJECTIVE AND OBJECTIVE BOX
[facility]  Thanh Michaels PA-C  [address] [place] [id number] [phone]    INTERVAL HPI/ OVERNIGHT EVENTS:  pt seen and examined, no new events  no abdominal pain, tolerating diet, no N/V/D  +BM, hgb stable     MEDICATIONS  (STANDING):  escitalopram 5 milliGRAM(s) Oral daily  hydrocortisone hemorrhoidal Suppository 1 Suppository(s) Rectal two times a day  levothyroxine 137 MICROGram(s) Oral daily  mesalamine Suppository 1000 milliGRAM(s) Rectal at bedtime  metoprolol succinate  milliGRAM(s) Oral daily  midodrine. 10 milliGRAM(s) Oral three times a day  pantoprazole    Tablet 40 milliGRAM(s) Oral before breakfast  piperacillin/tazobactam IVPB.. 3.375 Gram(s) IV Intermittent every 8 hours  polyethylene glycol 3350 17 Gram(s) Oral two times a day  senna 2 Tablet(s) Oral at bedtime  simvastatin 40 milliGRAM(s) Oral at bedtime    MEDICATIONS  (PRN):  aluminum hydroxide/magnesium hydroxide/simethicone Suspension 30 milliLiter(s) Oral every 6 hours PRN Dyspepsia  midodrine. 10 milliGRAM(s) Oral <User Schedule> PRN PRIOR TO HD  sodium chloride 0.9% Bolus. 100 milliLiter(s) IV Bolus every 5 minutes PRN SBP LESS THAN or EQUAL to 90 mmHg  escitalopram 5 milliGRAM(s) Oral daily  hydrocortisone hemorrhoidal Suppository 1 Suppository(s) Rectal two times a day  levothyroxine 137 MICROGram(s) Oral daily  mesalamine Suppository 1000 milliGRAM(s) Rectal at bedtime  metoprolol succinate  milliGRAM(s) Oral daily  PAST MEDICAL & SURGICAL HISTORY:  Afib  not on anticoagulation      CAD (coronary artery disease)      Varicose vein of leg      Hypothyroid      Pacemaker      H/O fracture of hip        H/O asbestosis      HTN (hypertension)      Pacemaker  Medtronic - Model RVDR01 1/10/2012      Stented coronary artery  drug eluding stent 2007      H/O detached retina repair        S/P cataract surgery      History of hip surgery  left hip ORIF      H/O varicose vein stripping   left leg      History of left knee replacement   - multiple infections post op requiring reoperation in , , 2019)      H/O foot surgery  for infection of right foot 2019      H/O inguinal hernia repair  right       Family history:  No pertinent family history in first degree relatives    FH: skin cancer (Father)        Social History:   no etoh no cigs   no ivda    ROS:     General:  No wt loss, fevers, chills, night sweats, fatigue,   Eyes:  Good vision, no reported pain  ENT:  No sore throat, pain, runny nose, dysphagia  CV:  No pain, palpitations, hypo/hypertension  Resp:  No dyspnea, cough, tachypnea, wheezing  GI:  No pain, No nausea, No vomiting, No diarrhea, No constipation, No weight loss, No fever, No pruritis, No rectal bleeding, No tarry stools, No dysphagia,  :  No pain, bleeding, incontinence, nocturia  Muscle:  No pain, weakness  Neuro:  No weakness, tingling, memory problems  Psych:  No fatigue, insomnia, mood problems, depression  Endocrine:  No polyuria, polydipsia, cold/heat intolerance  Heme:  No petechiae, ecchymosis, easy bruisability  Skin:  No rash, tattoos, scars, edema      PHYSICAL EXAM:   Vital Signs Last 24 Hrs  T(C): 36.4 (18 Aug 2023 09:00), Max: 37 (17 Aug 2023 19:57)  T(F): 97.5 (18 Aug 2023 09:00), Max: 98.6 (17 Aug 2023 19:57)  HR: 58 (18 Aug 2023 09:00) (57 - 96)  BP: 158/79 (18 Aug 2023 09:00) (123/68 - 158/79)  BP(mean): --  RR: 18 (18 Aug 2023 09:00) (18 - 18)  SpO2: 96% (18 Aug 2023 09:00) (94% - 97%)    Parameters below as of 18 Aug 2023 09:00  Patient On (Oxygen Delivery Method): nasal cannula  O2 Flow (L/min): 2      Daily Height in cm: 170.18 (11 Aug 2023 00:36)    Daily     GENERAL:  Appears stated age, well-groomed, well-nourished, no distress  HEENT:  NC/AT,  conjunctivae clear and pink, no thyromegaly, nodules, adenopathy, no JVD, sclera -anicteric  CHEST:  Full & symmetric excursion, no increased effort, breath sounds clear  HEART:  Regular rhythm, S1, S2, no murmur/rub/S3/S4, no abdominal bruit, no edema  ABDOMEN:  Soft, non-tender, non-distended, normoactive bowel sounds,  no masses ,no hepato-splenomegaly, no signs of chronic liver disease  EXTEREMITIES:  no cyanosis,clubbing or edema  SKIN:  No rash/erythema/ecchymoses/petechiae/wounds/abscess/warm/dry  NEURO:  Alert, oriented, no asterixis, no tremor, no encephalopathy    LABS:                        11.1   5.45  )-----------( 136      ( 18 Aug 2023 07:40 )             36.1   08-18    135  |  99  |  25<H>  ----------------------------<  85  3.9   |  26  |  3.02<H>    Ca    9.8      18 Aug 2023 07:40  Phos  2.4     08-17  Mg     1.7     08-17        LIVER FUNCTIONS - ( 11 Aug 2023 01:23 )  Alb: 3.4 g/dL / Pro: 7.0 g/dL / ALK PHOS: 137 U/L / ALT: 12 U/L / AST: 19 U/L / GGT: x           PT/INR - ( 11 Aug 2023 01:23 )   PT: 23.5 sec;   INR: 2.29 ratio         PTT - ( 11 Aug 2023 01:23 )  PTT:36.4 sec  Urinalysis Basic - ( 11 Aug 2023 06:13 )    Color: Dark Yellow / Appearance: Turbid / S.017 / pH: x  Gluc: x / Ketone: Negative  / Bili: Small / Urobili: 3 mg/dL   Blood: x / Protein: 100 mg/dL / Nitrite: Positive   Leuk Esterase: Large / RBC: 463 /hpf /  /HPF   Sq Epi: x / Non Sq Epi: x / Bacteria: Many      Amylase Serum--      Lipase serum12       Ammonia--      Imaging:    < from: NM GI Bleed Localization (NM GI Bleed Localization .) (23 @ 17:40) >  ACC: [id number] EXAM:  NM GI BLEEDING IMG   ORDERED BY: JAYLENE PARKS     PROCEDURE DATE:  2023          INTERPRETATION:  RADIOPHARMACEUTICAL:  20.0 mCi Tc-99m labeled autologous   red blood cells, I.V.    CLINICAL INFORMATION: GI Bleeding referred for localization of bleeding   site.    TECHNIQUE: Dynamic imaging of the abdomen and pelvis was performed for 2   hours following administration of radiolabeled autologous red blood   cells. Static images of the abdomen and pelvis in the anterior and   lateral views were obtained immediately thereafter. An Anterior view of   the head/neck was obtained for  purposes.    COMPARISON: None.    FINDINGS: There is physiologic distribution of radiolabeled red cells in   the abdomen and pelvis. There is no abnormal focus of increased   radiotracer activity to suggest the presence of an active bleeding site.    IMPRESSION:    No evidence of active bleeding site.     These results were discussed with DASAI Parks NP, by Dr. BARBRA Natarajan via   telephone with read back at about 6:00 PM on 2023.    --- End of Report ---            HAM NATARAJAN MD; Attending Nuclear Medicine  This document has been electronically signed. Aug 11 2023  6:01PM    < end of copied text >

## 2023-08-19 LAB
ANION GAP SERPL CALC-SCNC: 9 MMOL/L — SIGNIFICANT CHANGE UP (ref 5–17)
APTT BLD: 49 SEC — HIGH (ref 24.5–35.6)
APTT BLD: 52 SEC — HIGH (ref 24.5–35.6)
APTT BLD: 60.6 SEC — HIGH (ref 24.5–35.6)
BUN SERPL-MCNC: 18 MG/DL — SIGNIFICANT CHANGE UP (ref 7–23)
CALCIUM SERPL-MCNC: 9.5 MG/DL — SIGNIFICANT CHANGE UP (ref 8.4–10.5)
CHLORIDE SERPL-SCNC: 100 MMOL/L — SIGNIFICANT CHANGE UP (ref 96–108)
CO2 SERPL-SCNC: 27 MMOL/L — SIGNIFICANT CHANGE UP (ref 22–31)
CREAT SERPL-MCNC: 2.28 MG/DL — HIGH (ref 0.5–1.3)
EGFR: 29 ML/MIN/1.73M2 — LOW
GLUCOSE SERPL-MCNC: 84 MG/DL — SIGNIFICANT CHANGE UP (ref 70–99)
HCT VFR BLD CALC: 34.5 % — LOW (ref 39–50)
HGB BLD-MCNC: 10.7 G/DL — LOW (ref 13–17)
MCHC RBC-ENTMCNC: 28.2 PG — SIGNIFICANT CHANGE UP (ref 27–34)
MCHC RBC-ENTMCNC: 31 GM/DL — LOW (ref 32–36)
MCV RBC AUTO: 91 FL — SIGNIFICANT CHANGE UP (ref 80–100)
NRBC # BLD: 0 /100 WBCS — SIGNIFICANT CHANGE UP (ref 0–0)
PLATELET # BLD AUTO: 116 K/UL — LOW (ref 150–400)
POTASSIUM SERPL-MCNC: 3.8 MMOL/L — SIGNIFICANT CHANGE UP (ref 3.5–5.3)
POTASSIUM SERPL-SCNC: 3.8 MMOL/L — SIGNIFICANT CHANGE UP (ref 3.5–5.3)
RBC # BLD: 3.79 M/UL — LOW (ref 4.2–5.8)
RBC # FLD: 15.6 % — HIGH (ref 10.3–14.5)
SODIUM SERPL-SCNC: 136 MMOL/L — SIGNIFICANT CHANGE UP (ref 135–145)
WBC # BLD: 5.87 K/UL — SIGNIFICANT CHANGE UP (ref 3.8–10.5)
WBC # FLD AUTO: 5.87 K/UL — SIGNIFICANT CHANGE UP (ref 3.8–10.5)

## 2023-08-19 RX ADMIN — MIDODRINE HYDROCHLORIDE 10 MILLIGRAM(S): 2.5 TABLET ORAL at 18:32

## 2023-08-19 RX ADMIN — Medication 137 MICROGRAM(S): at 06:08

## 2023-08-19 RX ADMIN — ESCITALOPRAM OXALATE 5 MILLIGRAM(S): 10 TABLET, FILM COATED ORAL at 11:40

## 2023-08-19 RX ADMIN — Medication 100 MILLIGRAM(S): at 06:08

## 2023-08-19 RX ADMIN — MIDODRINE HYDROCHLORIDE 10 MILLIGRAM(S): 2.5 TABLET ORAL at 11:40

## 2023-08-19 RX ADMIN — MIDODRINE HYDROCHLORIDE 10 MILLIGRAM(S): 2.5 TABLET ORAL at 06:08

## 2023-08-19 RX ADMIN — HEPARIN SODIUM 11.5 UNIT(S)/HR: 5000 INJECTION INTRAVENOUS; SUBCUTANEOUS at 11:39

## 2023-08-19 RX ADMIN — ERTAPENEM SODIUM 100 MILLIGRAM(S): 1 INJECTION, POWDER, LYOPHILIZED, FOR SOLUTION INTRAMUSCULAR; INTRAVENOUS at 21:20

## 2023-08-19 RX ADMIN — PANTOPRAZOLE SODIUM 40 MILLIGRAM(S): 20 TABLET, DELAYED RELEASE ORAL at 06:08

## 2023-08-19 RX ADMIN — SIMVASTATIN 40 MILLIGRAM(S): 20 TABLET, FILM COATED ORAL at 21:21

## 2023-08-19 NOTE — PROGRESS NOTE ADULT - SUBJECTIVE AND OBJECTIVE BOX
Date of Service: 08-19-23 @ 08:52           CARDIOLOGY     PROGRESS  NOTE   ________________________________________________    CHIEF COMPLAINT:Patient is a 78y old  Male who presents with a chief complaint of abdominal pain/ rectal bleed (18 Aug 2023 19:44)    	  REVIEW OF SYSTEMS:  CONSTITUTIONAL: No fever, weight loss, or fatigue  EYES: No eye pain, visual disturbances, or discharge  ENT:  No difficulty hearing, tinnitus, vertigo; No sinus or throat pain  NECK: No pain or stiffness  RESPIRATORY: No cough, wheezing, chills or hemoptysis; No Shortness of Breath  CARDIOVASCULAR: No chest pain, palpitations, passing out, dizziness, or leg swelling  GASTROINTESTINAL: No abdominal or epigastric pain. No nausea, vomiting, or hematemesis; No diarrhea or constipation. No melena or hematochezia.  GENITOURINARY: No dysuria, frequency, hematuria, or incontinence  NEUROLOGICAL: No headaches, memory loss, loss of strength, numbness, or tremors  SKIN: No itching, burning, rashes, or lesions   LYMPH Nodes: No enlarged glands  ENDOCRINE: No heat or cold intolerance; No hair loss  MUSCULOSKELETAL: No joint pain or swelling; No muscle, back, or extremity pain  PSYCHIATRIC: No depression, anxiety, mood swings, or difficulty sleeping  HEME/LYMPH: No easy bruising, or bleeding gums  ALLERGY AND IMMUNOLOGIC: No hives or eczema	    [ ] All others negative	  [ ] Unable to obtain    PHYSICAL EXAM:  T(C): 36.4 (08-19-23 @ 08:04), Max: 37 (08-18-23 @ 22:19)  HR: 64 (08-19-23 @ 08:04) (58 - 81)  BP: 128/72 (08-19-23 @ 08:04) (91/56 - 158/79)  RR: 18 (08-19-23 @ 08:04) (17 - 18)  SpO2: 97% (08-19-23 @ 08:04) (95% - 100%)  Wt(kg): --  I&O's Summary    18 Aug 2023 07:01  -  19 Aug 2023 07:00  --------------------------------------------------------  IN: 720 mL / OUT: 2425 mL / NET: -1705 mL        Appearance: Normal	  HEENT:   Normal oral mucosa, PERRL, EOMI	  Lymphatic: No lymphadenopathy  Cardiovascular: Normal S1 S2, No JVD, + murmurs, No edema  Respiratory: rhonchi  Psychiatry: A & O x 3, Mood & affect appropriate  Gastrointestinal:  Soft, Non-tender, + BS	  Skin: No rashes, No ecchymoses, No cyanosis	  Neurologic: Non-focal  Extremities: Normal range of motion, No clubbing, cyanosis or edema  Vascular: +pvd    MEDICATIONS  (STANDING):  ertapenem  IVPB 500 milliGRAM(s) IV Intermittent every 24 hours  escitalopram 5 milliGRAM(s) Oral daily  heparin  Infusion 1200 Unit(s)/Hr (9.5 mL/Hr) IV Continuous <Continuous>  hydrocortisone hemorrhoidal Suppository 1 Suppository(s) Rectal two times a day  levothyroxine 137 MICROGram(s) Oral daily  lisinopril 2.5 milliGRAM(s) Oral daily  mesalamine Suppository 1000 milliGRAM(s) Rectal at bedtime  metoprolol succinate  milliGRAM(s) Oral daily  midodrine. 10 milliGRAM(s) Oral three times a day  pantoprazole    Tablet 40 milliGRAM(s) Oral before breakfast  polyethylene glycol 3350 17 Gram(s) Oral daily  senna 2 Tablet(s) Oral at bedtime  simvastatin 40 milliGRAM(s) Oral at bedtime      TELEMETRY: 	    ECG:  	  RADIOLOGY:  OTHER: 	  	  LABS:	 	    CARDIAC MARKERS:                                10.7   5.87  )-----------( 116      ( 19 Aug 2023 06:02 )             34.5     08-19    136  |  100  |  18  ----------------------------<  84  3.8   |  27  |  2.28<H>    Ca    9.5      19 Aug 2023 06:02  Phos  2.4     08-17  Mg     1.7     08-17      proBNP:   Lipid Profile:   HgA1c:   TSH:   PTT - ( 19 Aug 2023 06:02 )  PTT:49.0 sec    Culture - Blood (08.14.23 @ 23:02)    Specimen Source: .Blood Blood-Peripheral   Culture Results:   No growth at 4 days      Assessment and plan  Culture - Blood in AM (08.13.23 @ 06:45)    Gram Stain:   Growth in anaerobic bottle: Gram Positive Cocci in Clusters  Growth in aerobic bottle: Gram Positive Cocci in Clusters   -  Ampicillin/Sulbactam: R <=8/4   -  Cefazolin: R 16   -  Clindamycin: R >4   -  Erythromycin: R >4   -  Gentamicin: R >8 Should not be used as monotherapy   -  Oxacillin: R >2   -  Penicillin: R 4   -  Rifampin: S <=1 Should not be used as monotherapy   -  Tetracycline: S 2   -  Trimethoprim/Sulfamethoxazole: R >2/38   -  Vancomycin: S 4   Specimen Source: .Blood Blood   Organism: Staphylococcus epidermidis   Culture Results:   Growth in aerobic and anaerobic bottles: Staphylococcus epidermidis   Organism Identification: Staphylococcus epidermidis   Method Type: LEWIS    * c/w ertapenem 500 qd, will complete a 10 day course for bacteremia, on discharge switch to PO flaygl 500 q 8 through 8/22  * rectal bleed management as per the primary team  * will sign off, please call with questions      Recommendations:  - Please obtain bilateral vein mapping.  - Please document medical and cardiac clearance and risk stratifications.  - Please protect the Right arm (NO BP cuffs, NO IVs, NO blood withdrawals)  - Rest of care per primary team    ---------------------------  Liam Garza is 79 y.o. M with PMHx Significant for HLD, A-fib, BPH, ESRD, CANDY, who presents to the ED with c.o. abd pain, rectal pain, and bloody stools.  Per patient, he has been experiencing abd pain and rectal bleeding onset 1 day ago.  Patient notes pain is localized to the suprapubic area, radiates to the RLQ.  He notes pain is intermittent and has had minimal improvement with Tylenol.  Nothing seems to exacerbate his pain.  His only other associated symptom is chills.  Otherwise he denies fevers, N/V, HA, changes in his vision, sore throat, SOB, CP, diarrhea or constipation. Blood per rectum is accumulated in his depends. Of note, patient notes rectal pain began in May which she describes as sharp and has been constant since onset.  pt with hx of chronic a.fib, chf systolic EF 35 to 40% s/p ppm ,MICRA sec to ppm exrtacction sec to + BC.  sob sec acute on chronic chf systolc, fluid overload  renal eval possible HD today  ?rectal bleed hold AC, GI eval  ASHD / A. FIb continue beta blocker   repeat echo with hx of pericardial effusion  clear liquid diet  ct ?colitis/ + ua start on abx/ ID noted, awaiting culture  + BC ID follow up discussed with ACP  GI/ MED appreciated  pulmonary edema/ chf, repeat echo check MV, continue HD with increase fluid withdrawal as tolerated  will add ace if bp can tolerate  started on iv heparin yesterday, fu hgb, if no bleeding will switch to NOAC will hold for now  advance diet as  tolerated  doing better, oob to chair/ physical therapy  blood cultures are still +, ID appreciated awaiting ECHO concern about bioprosthetic MV, may need ERENDIRA  doing much better clinically, may switch iv heparin to NOAC if no procedure needed, hgb stable  ID / renal appreciated, ?vascular for AV fistulae placement, but bacteriemia needs to be resolved  dc planning after total of 10 days of abx  add lisinopril 2.5 mg daily  if no improvement in Ef pt will need AICD in 3 months as per primary prevention  vascular consulted fo  av matthew/ fistulae, appreciated /vein mapping  physical therapy

## 2023-08-19 NOTE — PROGRESS NOTE ADULT - SUBJECTIVE AND OBJECTIVE BOX
date of service: 08-19-23 @ 09:07  afberile  REVIEW OF SYSTEMS:  CONSTITUTIONAL: No fever,  no  weight loss  ENT:  No  tinnitus,   no   vertigo  NECK: No pain or stiffness  RESPIRATORY: No cough, wheezing, chills or hemoptysis;    No Shortness of Breath  CARDIOVASCULAR: No chest pain, palpitations, dizziness  GASTROINTESTINAL: No abdominal or epigastric pain. No nausea, vomiting, or hematemesis; No diarrhea  No melena or hematochezia.  GENITOURINARY: No dysuria, frequency, hematuria, or incontinence  NEUROLOGICAL: No headaches  SKIN: No itching,  no   rash  LYMPH Nodes: No enlarged glands  ENDOCRINE: No heat or cold intolerance  MUSCULOSKELETAL: No joint pain or swelling  PSYCHIATRIC: No depression, anxiety  HEME/LYMPH: No easy bruising, or bleeding gums  ALLERGY AND IMMUNOLOGIC: No hives or eczema	    MEDICATIONS  (STANDING):  ertapenem  IVPB 500 milliGRAM(s) IV Intermittent every 24 hours  escitalopram 5 milliGRAM(s) Oral daily  heparin  Infusion 1200 Unit(s)/Hr (9.5 mL/Hr) IV Continuous <Continuous>  hydrocortisone hemorrhoidal Suppository 1 Suppository(s) Rectal two times a day  levothyroxine 137 MICROGram(s) Oral daily  lisinopril 2.5 milliGRAM(s) Oral daily  mesalamine Suppository 1000 milliGRAM(s) Rectal at bedtime  metoprolol succinate  milliGRAM(s) Oral daily  midodrine. 10 milliGRAM(s) Oral three times a day  pantoprazole    Tablet 40 milliGRAM(s) Oral before breakfast  polyethylene glycol 3350 17 Gram(s) Oral daily  senna 2 Tablet(s) Oral at bedtime  simvastatin 40 milliGRAM(s) Oral at bedtime    MEDICATIONS  (PRN):  aluminum hydroxide/magnesium hydroxide/simethicone Suspension 30 milliLiter(s) Oral every 6 hours PRN Dyspepsia  midodrine. 20 milliGRAM(s) Oral <User Schedule> PRN PRIOR TO HD  sodium chloride 0.9% Bolus. 100 milliLiter(s) IV Bolus every 5 minutes PRN SBP LESS THAN or EQUAL to 90 mmHg      Vital Signs Last 24 Hrs  T(C): 36.4 (19 Aug 2023 08:04), Max: 37 (18 Aug 2023 22:19)  T(F): 97.6 (19 Aug 2023 08:04), Max: 98.6 (18 Aug 2023 22:19)  HR: 64 (19 Aug 2023 08:04) (58 - 81)  BP: 128/72 (19 Aug 2023 08:04) (91/56 - 132/61)  BP(mean): --  RR: 18 (19 Aug 2023 08:04) (17 - 18)  SpO2: 97% (19 Aug 2023 08:04) (95% - 100%)    Parameters below as of 19 Aug 2023 08:04  Patient On (Oxygen Delivery Method): nasal cannula  O2 Flow (L/min): 2    CAPILLARY BLOOD GLUCOSE        I&O's Summary    18 Aug 2023 07:01  -  19 Aug 2023 07:00  --------------------------------------------------------  IN: 720 mL / OUT: 2425 mL / NET: -1705 mL          Appearance: Normal	  HEENT:   Normal oral mucosa, PERRL, EOMI	  Lymphatic: No lymphadenopathy  Cardiovascular: Normal S1 S2, No JVD  Respiratory: Lungs clear to auscultation	  Gastrointestinal:  Soft, Non-tender, + BS	  Skin: No rash, No ecchymoses	  Extremities:     LABS:                        10.7   5.87  )-----------( 116      ( 19 Aug 2023 06:02 )             34.5     08-19    136  |  100  |  18  ----------------------------<  84  3.8   |  27  |  2.28<H>    Ca    9.5      19 Aug 2023 06:02  Phos  2.4     08-17  Mg     1.7     08-17      PTT - ( 19 Aug 2023 06:02 )  PTT:49.0 sec      Urinalysis Basic - ( 19 Aug 2023 06:02 )    Color: x / Appearance: x / SG: x / pH: x  Gluc: 84 mg/dL / Ketone: x  / Bili: x / Urobili: x   Blood: x / Protein: x / Nitrite: x   Leuk Esterase: x / RBC: x / WBC x   Sq Epi: x / Non Sq Epi: x / Bacteria: x                      Consultant(s) Notes Reviewed:      Care Discussed with Consultants/Other Providers:

## 2023-08-19 NOTE — PROGRESS NOTE ADULT - ASSESSMENT
78  year old male    h/o  CAD s/p stent , asa.     A-fib/ PPM, , hypothyroid, and total left knee replacement    prior ppm  interrogation  with  WCT   Ct chest, retrosternal hematoma.  mod left pl effusion/ has   c/c leg  pain/  h/o  non complaince  with meds       admitted  with  abd pain and rectal bleeding onset 1 day ago.. arrive s form n home     has  no pain  now    c/c   systolic  and  diastolic  chf,,  cardiomyopathy,. Mitral  valvuloplasty ,  h/o  c/c  l/edema  of L  leg    h/o    c/c AFIB,   has  PPM         eliquis   on  hold  now      h/o c/c   Anemia, , hb is  12.  gi   known to  dr joann INFAN[facility],     cath, with 2 vessel disease,  s/p  CABG and  MVR  surg d r marni      echo,  on 7/2022,  ef  35/ new/ severe  TR,  and ,  cath,   was non  obstructive     s/p   MSSA  bacterinia , in march 2023, completed  iv ancef..    s/p   explant pf  ppm  and  Micra  placement on  3/30/23. dr sunitha rocha      Echo,  3/2023,  ef  25,  mod  AI. severe  TR . RV hypokinesis    BEULAH, from  [facility], dx  recently , ancef / was  switched   to iv vanco,   CKD, on HD,  has  right Permcath.house  renal   s/p  renal bx on 4/ 14.. 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 + , Ig A  Lamda. ,  known to heme  dr dobson.  pe r heme,  no  need  for  b marrow  bx    cxr/  CT chest,  pl  effusion,  will need  HD  gi    f/p,  no  bleeding  now/  nuclear  scan, no  bleeding   bacteremia   Clostridium cadeveris,  iv   ertapenem   rpt  bcx   8/13, Staph  epi/  need  discussion., ? contamianant,  given  priro   history  of bacteremia   has Micra/  MVR  rpt bc 8/14,  thus  far, is  negative    pe r iD.  plan 10  days  of  iv Ertapenem  then  po  flagyl  on iv heparin/  vascular f/p/  awiating  av fistula /  remains  afebrile/  girl  friend  updated                   card /  dr mikayla dick       < from: Intra-Operative Transesophageal Echo W or WO Ultrasound Enhancing Agent (03.30.23 @ 12:48) >  --------------------------------------------------------------------------------  PRE-BYPASS FINDINGS  Left Ventricle:  Left ventricular ejection fraction is estimated at 25 to 30%. There is severe kypokinesis in the left ventricle.  Right Ventricle:  Moderately reduced systolic function. Moderately reduced systolic function. There is moderate hypokinesis in the right ventricle.  Left Atrium:  Diffuse smoke visualized in LA.  Aortic Valve:  The aortic valve appears trileaflet. There is moderate aortic regurgitation.  No obvious vegetations visualized  Mitral Valve:  Bioprosthetic valve is present in the mitral position. There is calcification of the mitral valve annulus.  No obvious vegetations visualized.  Tricuspid Valve:  There is moderate-severe tricuspid regurgitation. TR unchanged following lead extraction.  Pulmonic Valve:  There is mild pulmonic regurgitation.  Pericardium:  No pericardial effusion seen. No pericardial effusion visualized before and after lead extraction.  Electronically signed by Jimmy Gambino on 3/30/2023 at 3:00:58 PM  *** Final ***  < end of copied text >

## 2023-08-19 NOTE — OCCUPATIONAL THERAPY INITIAL EVALUATION ADULT - LIVES WITH, PROFILE
Pt transferred from rehab. Lives alone in an apt, with 2 steps to enter. +Elevator. (I) ADLs and transfers. Owns toilet safety rails, rolling walker, straight cane, hip kit/alone

## 2023-08-19 NOTE — OCCUPATIONAL THERAPY INITIAL EVALUATION ADULT - BALANCE TRAINING, PT EVAL
Pt will increase dynamic standing balance to Fair (-) to increase safety/independence with functional transfers and ADLs within 4 weeks

## 2023-08-19 NOTE — OCCUPATIONAL THERAPY INITIAL EVALUATION ADULT - PLANNED THERAPY INTERVENTIONS, OT EVAL
ADL retraining/balance training/bed mobility training/ROM/transfer training ADL retraining/balance training/bed mobility training/strengthening/transfer training

## 2023-08-19 NOTE — OCCUPATIONAL THERAPY INITIAL EVALUATION ADULT - TRANSFER TRAINING, PT EVAL
Patient will transfer to toilet with DME as needed mod A in 4 weeks. Pt will perform slide board transfer with mod A within 4 weeks.

## 2023-08-19 NOTE — OCCUPATIONAL THERAPY INITIAL EVALUATION ADULT - PERTINENT HX OF CURRENT PROBLEM, REHAB EVAL
79 y.o. M with PMHx Significant for HLD, A-fib, BPH, ESRD, CANDY, who presents to the ED with c.o. abd pain, rectal pain, and bloody stools.  Per patient, he has been experiencing abd pain and rectal bleeding onset 1 day ago.  Patient notes pain is localized to the suprapubic area, radiates to the RLQ.  He notes pain is intermittent and has had minimal improvement with Tylenol.  Nothing seems to exacerbate his pain.  His only other associated symptom is chills.  Otherwise he denies fevers, N/V, HA, changes in his vision, sore throat, SOB, CP, diarrhea or constipation. Blood per rectum is accumulated in his depends. Of note, patient notes rectal pain began in May which she describes as sharp and has been constant since onset.  CT abdomen-Abundant fecal material within the rectum with mild wall thickening and trace surrounding stranding. Recommend clinical correlation to assess for stercoral colitis. No bowel obstruction. Cholelithiasis. Findings of pulmonary edema with moderate right and small left pleural effusion.

## 2023-08-19 NOTE — OCCUPATIONAL THERAPY INITIAL EVALUATION ADULT - RANGE OF MOTION EXAMINATION, LOWER EXTREMITY
L hip and ankle WFL; R LE limited by arthritic changes/Right LE Active Assistive ROM was WFL  (within functional limits)

## 2023-08-20 LAB
ANION GAP SERPL CALC-SCNC: 10 MMOL/L — SIGNIFICANT CHANGE UP (ref 5–17)
BUN SERPL-MCNC: 34 MG/DL — HIGH (ref 7–23)
CALCIUM SERPL-MCNC: 9.7 MG/DL — SIGNIFICANT CHANGE UP (ref 8.4–10.5)
CHLORIDE SERPL-SCNC: 102 MMOL/L — SIGNIFICANT CHANGE UP (ref 96–108)
CO2 SERPL-SCNC: 19 MMOL/L — LOW (ref 22–31)
CREAT SERPL-MCNC: 2.37 MG/DL — HIGH (ref 0.5–1.3)
CULTURE RESULTS: SIGNIFICANT CHANGE UP
CULTURE RESULTS: SIGNIFICANT CHANGE UP
EGFR: 28 ML/MIN/1.73M2 — LOW
GLUCOSE SERPL-MCNC: 74 MG/DL — SIGNIFICANT CHANGE UP (ref 70–99)
HCT VFR BLD CALC: 30.7 % — LOW (ref 39–50)
HGB BLD-MCNC: 9.7 G/DL — LOW (ref 13–17)
MCHC RBC-ENTMCNC: 28.7 PG — SIGNIFICANT CHANGE UP (ref 27–34)
MCHC RBC-ENTMCNC: 31.6 GM/DL — LOW (ref 32–36)
MCV RBC AUTO: 90.8 FL — SIGNIFICANT CHANGE UP (ref 80–100)
NRBC # BLD: 0 /100 WBCS — SIGNIFICANT CHANGE UP (ref 0–0)
PLATELET # BLD AUTO: 116 K/UL — LOW (ref 150–400)
POTASSIUM SERPL-MCNC: 4.5 MMOL/L — SIGNIFICANT CHANGE UP (ref 3.5–5.3)
POTASSIUM SERPL-SCNC: 4.5 MMOL/L — SIGNIFICANT CHANGE UP (ref 3.5–5.3)
RBC # BLD: 3.38 M/UL — LOW (ref 4.2–5.8)
RBC # FLD: 15.9 % — HIGH (ref 10.3–14.5)
SODIUM SERPL-SCNC: 131 MMOL/L — LOW (ref 135–145)
SPECIMEN SOURCE: SIGNIFICANT CHANGE UP
SPECIMEN SOURCE: SIGNIFICANT CHANGE UP
WBC # BLD: 5.9 K/UL — SIGNIFICANT CHANGE UP (ref 3.8–10.5)
WBC # FLD AUTO: 5.9 K/UL — SIGNIFICANT CHANGE UP (ref 3.8–10.5)

## 2023-08-20 RX ORDER — ERYTHROMYCIN BASE 5 MG/GRAM
1 OINTMENT (GRAM) OPHTHALMIC (EYE) THREE TIMES A DAY
Refills: 0 | Status: DISCONTINUED | OUTPATIENT
Start: 2023-08-20 | End: 2023-08-25

## 2023-08-20 RX ADMIN — MIDODRINE HYDROCHLORIDE 10 MILLIGRAM(S): 2.5 TABLET ORAL at 18:27

## 2023-08-20 RX ADMIN — LISINOPRIL 2.5 MILLIGRAM(S): 2.5 TABLET ORAL at 05:47

## 2023-08-20 RX ADMIN — Medication 137 MICROGRAM(S): at 05:47

## 2023-08-20 RX ADMIN — HEPARIN SODIUM 11.5 UNIT(S)/HR: 5000 INJECTION INTRAVENOUS; SUBCUTANEOUS at 21:06

## 2023-08-20 RX ADMIN — MIDODRINE HYDROCHLORIDE 10 MILLIGRAM(S): 2.5 TABLET ORAL at 05:47

## 2023-08-20 RX ADMIN — HEPARIN SODIUM 11.5 UNIT(S)/HR: 5000 INJECTION INTRAVENOUS; SUBCUTANEOUS at 19:42

## 2023-08-20 RX ADMIN — SIMVASTATIN 40 MILLIGRAM(S): 20 TABLET, FILM COATED ORAL at 21:14

## 2023-08-20 RX ADMIN — ERTAPENEM SODIUM 100 MILLIGRAM(S): 1 INJECTION, POWDER, LYOPHILIZED, FOR SOLUTION INTRAMUSCULAR; INTRAVENOUS at 19:41

## 2023-08-20 RX ADMIN — Medication 1000 MILLIGRAM(S): at 21:11

## 2023-08-20 RX ADMIN — Medication 1 APPLICATION(S): at 14:41

## 2023-08-20 RX ADMIN — PANTOPRAZOLE SODIUM 40 MILLIGRAM(S): 20 TABLET, DELAYED RELEASE ORAL at 05:47

## 2023-08-20 RX ADMIN — HEPARIN SODIUM 11.5 UNIT(S)/HR: 5000 INJECTION INTRAVENOUS; SUBCUTANEOUS at 07:47

## 2023-08-20 RX ADMIN — Medication 100 MILLIGRAM(S): at 05:47

## 2023-08-20 RX ADMIN — Medication 1 APPLICATION(S): at 21:10

## 2023-08-20 RX ADMIN — ESCITALOPRAM OXALATE 5 MILLIGRAM(S): 10 TABLET, FILM COATED ORAL at 11:29

## 2023-08-20 RX ADMIN — MIDODRINE HYDROCHLORIDE 10 MILLIGRAM(S): 2.5 TABLET ORAL at 11:29

## 2023-08-20 NOTE — PROGRESS NOTE ADULT - ASSESSMENT
78  year old male    h/o  CAD s/p stent , asa.     A-fib/ PPM, , hypothyroid, and total left knee replacement    prior ppm  interrogation  with  WCT   Ct chest, retrosternal hematoma.  mod left pl effusion/ has   c/c leg  pain/  h/o  non complaince  with meds       admitted  with  abd pain and rectal bleeding onset 1 day ago.. arrive s form n home     has  no pain  now    c/c   systolic  and  diastolic  chf,,  cardiomyopathy,. Mitral  valvuloplasty ,  h/o  c/c  l/edema  of L  leg    h/o    c/c AFIB,   has  PPM         eliquis   on  hold  now      h/o c/c   Anemia, , hb is  12.  gi   known to  dr joann INF[facility],     cath, with 2 vessel disease,  s/p  CABG and  MVR  surg d r marni      echo,  on 7/2022,  ef  35/ new/ severe  TR,  and ,  cath,   was non  obstructive     s/p   MSSA  bacterinia , in march 2023, completed  iv ancef..    s/p   explant pf  ppm  and  Micra  placement on  3/30/23. dr sunitha rocha      Echo,  3/2023,  ef  25,  mod  AI. severe  TR . RV hypokinesis    BEULAH, from  [facility], dx  recently , ancef / was  switched   to iv vanco,   CKD, on HD,  has  right Permcath.house  renal   s/p  renal bx on 4/ 14.. 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 + , Ig A  Lamda. ,  known to heme  dr dobson.  pe r heme,  no  need  for  b marrow  bx    cxr/  CT chest,  pl  effusion,  will need  HD  gi    f/p,  no  bleeding  now/  nuclear  scan, no  bleeding   bacteremia   Clostridium cadeveris,  iv   ertapenem   rpt  bcx   8/13, Staph  epi/  need  discussion., ? contamianant,  given  priro   history  of bacteremia   has Micra/  MVR  rpt bc 8/14,  thus  far, is  negative    per  ID.  plan 10  days  of  iv Ertapenem  then  po  flagyl  on iv heparin/  vascular f/p/  awiating  av fistula /  NEED S VEIN MAPPING    left  eye,  conjuctivitis                   card /  dr mikayla dick       < from: Intra-Operative Transesophageal Echo W or WO Ultrasound Enhancing Agent (03.30.23 @ 12:48) >  --------------------------------------------------------------------------------  PRE-BYPASS FINDINGS  Left Ventricle:  Left ventricular ejection fraction is estimated at 25 to 30%. There is severe kypokinesis in the left ventricle.  Right Ventricle:  Moderately reduced systolic function. Moderately reduced systolic function. There is moderate hypokinesis in the right ventricle.  Left Atrium:  Diffuse smoke visualized in LA.  Aortic Valve:  The aortic valve appears trileaflet. There is moderate aortic regurgitation.  No obvious vegetations visualized  Mitral Valve:  Bioprosthetic valve is present in the mitral position. There is calcification of the mitral valve annulus.  No obvious vegetations visualized.  Tricuspid Valve:  There is moderate-severe tricuspid regurgitation. TR unchanged following lead extraction.  Pulmonic Valve:  There is mild pulmonic regurgitation.  Pericardium:  No pericardial effusion seen. No pericardial effusion visualized before and after lead extraction.  Electronically signed by Jimmy Gambino on 3/30/2023 at 3:00:58 PM  *** Final ***  < end of copied text >

## 2023-08-20 NOTE — PROGRESS NOTE ADULT - SUBJECTIVE AND OBJECTIVE BOX
Date of Service: 08-20-23 @ 05:40           CARDIOLOGY     PROGRESS  NOTE   ________________________________________________    CHIEF COMPLAINT:Patient is a 78y old  Male who presents with a chief complaint of abdominal pain/ rectal bleed (19 Aug 2023 09:06)  no complain  	  REVIEW OF SYSTEMS:  CONSTITUTIONAL: No fever, weight loss, or fatigue  EYES: No eye pain, visual disturbances, or discharge  ENT:  No difficulty hearing, tinnitus, vertigo; No sinus or throat pain  NECK: No pain or stiffness  RESPIRATORY: No cough, wheezing, chills or hemoptysis; No Shortness of Breath  CARDIOVASCULAR: No chest pain, palpitations, passing out, dizziness, or leg swelling  GASTROINTESTINAL: No abdominal or epigastric pain. No nausea, vomiting, or hematemesis; No diarrhea or constipation. No melena or hematochezia.  GENITOURINARY: No dysuria, frequency, hematuria, or incontinence  NEUROLOGICAL: No headaches, memory loss, loss of strength, numbness, or tremors  SKIN: No itching, burning, rashes, or lesions   LYMPH Nodes: No enlarged glands  ENDOCRINE: No heat or cold intolerance; No hair loss  MUSCULOSKELETAL: No joint pain or swelling; No muscle, back, or extremity pain  PSYCHIATRIC: No depression, anxiety, mood swings, or difficulty sleeping  HEME/LYMPH: No easy bruising, or bleeding gums  ALLERGY AND IMMUNOLOGIC: No hives or eczema	    [x] All others negative	  [ ] Unable to obtain    PHYSICAL EXAM:  T(C): 36.8 (08-20-23 @ 05:16), Max: 37.1 (08-19-23 @ 19:23)  HR: 62 (08-20-23 @ 05:16) (62 - 83)  BP: 103/64 (08-20-23 @ 05:16) (94/51 - 128/72)  RR: 18 (08-20-23 @ 05:16) (18 - 18)  SpO2: 93% (08-20-23 @ 05:16) (93% - 97%)  Wt(kg): --  I&O's Summary    18 Aug 2023 07:01  -  19 Aug 2023 07:00  --------------------------------------------------------  IN: 720 mL / OUT: 2425 mL / NET: -1705 mL    19 Aug 2023 07:01  -  20 Aug 2023 05:40  --------------------------------------------------------  IN: 200 mL / OUT: 250 mL / NET: -50 mL        Appearance: Normal	  HEENT:   Normal oral mucosa, PERRL, EOMI	  Lymphatic: No lymphadenopathy  Cardiovascular: Normal S1 S2, No JVD, + murmurs, No edema  Respiratory:rhonchi  Psychiatry: A & O x 3, Mood & affect appropriate  Gastrointestinal:  Soft, Non-tender, + BS	  Skin: No rashes, No ecchymoses, No cyanosis	  Neurologic: Non-focal  Extremities: Normal range of motion, No clubbing, cyanosis or edema  Vascular: Peripheral pulses palpable 2+ bilaterally    MEDICATIONS  (STANDING):  ertapenem  IVPB 500 milliGRAM(s) IV Intermittent every 24 hours  escitalopram 5 milliGRAM(s) Oral daily  heparin  Infusion 1200 Unit(s)/Hr (11.5 mL/Hr) IV Continuous <Continuous>  hydrocortisone hemorrhoidal Suppository 1 Suppository(s) Rectal two times a day  levothyroxine 137 MICROGram(s) Oral daily  lisinopril 2.5 milliGRAM(s) Oral daily  mesalamine Suppository 1000 milliGRAM(s) Rectal at bedtime  metoprolol succinate  milliGRAM(s) Oral daily  midodrine. 10 milliGRAM(s) Oral three times a day  pantoprazole    Tablet 40 milliGRAM(s) Oral before breakfast  polyethylene glycol 3350 17 Gram(s) Oral daily  senna 2 Tablet(s) Oral at bedtime  simvastatin 40 milliGRAM(s) Oral at bedtime      TELEMETRY: 	    ECG:  	  RADIOLOGY:  OTHER: 	  	  LABS:	 	    CARDIAC MARKERS:                            10.7   5.87  )-----------( 116      ( 19 Aug 2023 06:02 )             34.5     08-19    136  |  100  |  18  ----------------------------<  84  3.8   |  27  |  2.28<H>    Ca    9.5      19 Aug 2023 06:02      proBNP:   Lipid Profile:   HgA1c:   TSH:   PTT - ( 19 Aug 2023 23:08 )  PTT:60.6 sec    < from: Xray Chest 1 View AP/PA (08.11.23 @ 01:32) >    New right pleural effusion, with a subjacent atelectasis. No free air   underneath the diaphragm.        Assessment and plan  ---------------------------  Liam Garza is 79 y.o. M with PMHx Significant for HLD, A-fib, BPH, ESRD, CANDY, who presents to the ED with c.o. abd pain, rectal pain, and bloody stools.  Per patient, he has been experiencing abd pain and rectal bleeding onset 1 day ago.  Patient notes pain is localized to the suprapubic area, radiates to the RLQ.  He notes pain is intermittent and has had minimal improvement with Tylenol.  Nothing seems to exacerbate his pain.  His only other associated symptom is chills.  Otherwise he denies fevers, N/V, HA, changes in his vision, sore throat, SOB, CP, diarrhea or constipation. Blood per rectum is accumulated in his depends. Of note, patient notes rectal pain began in May which she describes as sharp and has been constant since onset.  pt with hx of chronic a.fib, chf systolic EF 35 to 40% s/p ppm ,MICRA sec to ppm exrtacction sec to + BC.  sob sec acute on chronic chf systolc, fluid overload  renal eval possible HD today  ?rectal bleed hold AC, GI eval  ASHD / A. FIb continue beta blocker   repeat echo with hx of pericardial effusion  clear liquid diet  ct ?colitis/ + ua start on abx/ ID noted, awaiting culture  + BC ID follow up discussed with ACP  GI/ MED appreciated  pulmonary edema/ chf, repeat echo check MV, continue HD with increase fluid withdrawal as tolerated  will add ace if bp can tolerate  started on iv heparin yesterday, fu hgb, if no bleeding will switch to NOAC will hold for now  advance diet as  tolerated  doing better, oob to chair/ physical therapy  blood cultures are still +, ID appreciated awaiting ECHO concern about bioprosthetic MV, may need ERENDIRA  doing much better clinically, may switch iv heparin to NOAC if no procedure needed, hgb stable  ID / renal appreciated, ?vascular for AV fistulae placement, but bacteriemia needs to be resolved  dc planning after total of 10 days of abx  add lisinopril 2.5 mg daily  if no improvement in Ef pt will need AICD in 3 months as per primary prevention  vascular consulted for  av matthew/ fistulae, appreciated /vein mapping  repeat chestt x ray to check for pulmonary edema and pleural effusion  oob to chair/ physical therapy  continue IV abx

## 2023-08-20 NOTE — PROGRESS NOTE ADULT - SUBJECTIVE AND OBJECTIVE BOX
Surgery Daily Progress Note  =====================================================  SUBJECTIVE: A 78y Male Patient seen and examined at bedside on AM rounds.     ALLERGIES:  Myrbetriq (Hives)  tamsulosin (Hives)  alfuzosin (Hives)  levofloxacin (Hives)  --------------------------------------------------------------------------------------    MEDICATIONS:    Neurologic Medications  escitalopram 5 milliGRAM(s) Oral daily    Respiratory Medications    Cardiovascular Medications  lisinopril 2.5 milliGRAM(s) Oral daily  metoprolol succinate  milliGRAM(s) Oral daily  midodrine. 10 milliGRAM(s) Oral three times a day  midodrine. 20 milliGRAM(s) Oral <User Schedule> PRN PRIOR TO HD    Gastrointestinal Medications  aluminum hydroxide/magnesium hydroxide/simethicone Suspension 30 milliLiter(s) Oral every 6 hours PRN Dyspepsia  mesalamine Suppository 1000 milliGRAM(s) Rectal at bedtime  pantoprazole    Tablet 40 milliGRAM(s) Oral before breakfast  polyethylene glycol 3350 17 Gram(s) Oral daily  senna 2 Tablet(s) Oral at bedtime  sodium chloride 0.9% Bolus. 100 milliLiter(s) IV Bolus every 5 minutes PRN SBP LESS THAN or EQUAL to 90 mmHg    Genitourinary Medications    Hematologic/Oncologic Medications  heparin  Infusion 1200 Unit(s)/Hr IV Continuous <Continuous>    Antimicrobial/Immunologic Medications  ertapenem  IVPB 500 milliGRAM(s) IV Intermittent every 24 hours    Endocrine/Metabolic Medications  levothyroxine 137 MICROGram(s) Oral daily  simvastatin 40 milliGRAM(s) Oral at bedtime    Topical/Other Medications  artificial  tears Solution 1 Drop(s) Both EYES four times a day PRN Dry Eyes  hydrocortisone hemorrhoidal Suppository 1 Suppository(s) Rectal two times a day    --------------------------------------------------------------------------------------    VITAL SIGNS:  Myrbetriq (Hives)  tamsulosin (Hives)  alfuzosin (Hives)  levofloxacin (Hives)      T(C): 36.8 (08-20-23 @ 05:16), Max: 37.1 (08-19-23 @ 19:23)  HR: 62 (08-20-23 @ 05:16) (62 - 83)  BP: 103/64 (08-20-23 @ 05:16) (94/51 - 128/72)  RR: 18 (08-20-23 @ 05:16) (18 - 18)  SpO2: 93% (08-20-23 @ 05:16) (93% - 97%)  --------------------------------------------------------------------------------------    PHYSICAL EXAM:   General: NAD, Lying in bed comfortably  Neuro: A+Ox3  HEENT: NC/AT, EOMI  Neck: Soft, supple  Cardio: RRR, nml S1/S2  Resp: Good effort, CTA b/l  GI/Abd: Soft, NT/ND, no rebound/guarding, no masses palpated  Vascular: Bilateral arms are warm. Right arm is protected.  Skin: Intact, no breakdown    --------------------------------------------------------------------------------------    I&Os  T(C): 36.8 (08-20-23 @ 05:16), Max: 37.1 (08-19-23 @ 19:23)  HR: 62 (08-20-23 @ 05:16) (62 - 83)  BP: 103/64 (08-20-23 @ 05:16) (94/51 - 128/72)  RR: 18 (08-20-23 @ 05:16) (18 - 18)  SpO2: 93% (08-20-23 @ 05:16) (93% - 97%)  --------------------------------------------------------------------------------------    LABS                          9.7    5.90  )-----------( 116      ( 20 Aug 2023 06:50 )             30.7     08-19    136  |  100  |  18  ----------------------------<  84  3.8   |  27  |  2.28<H>    Ca    9.5      19 Aug 2023 06:02      PTT - ( 19 Aug 2023 23:08 )  PTT:60.6 sec  Urinalysis Basic - ( 19 Aug 2023 06:02 )    Color: x / Appearance: x / SG: x / pH: x  Gluc: 84 mg/dL / Ketone: x  / Bili: x / Urobili: x   Blood: x / Protein: x / Nitrite: x   Leuk Esterase: x / RBC: x / WBC x   Sq Epi: x / Non Sq Epi: x / Bacteria: x        08-19-23 @ 07:01  -  08-20-23 @ 07:00  --------------------------------------------------------  IN: 200 mL / OUT: 500 mL / NET: -300 mL      aluminum hydroxide/magnesium hydroxide/simethicone Suspension 30 milliLiter(s) Oral every 6 hours PRN  artificial  tears Solution 1 Drop(s) Both EYES four times a day PRN  ertapenem  IVPB 500 milliGRAM(s) IV Intermittent every 24 hours  escitalopram 5 milliGRAM(s) Oral daily  heparin  Infusion 1200 Unit(s)/Hr IV Continuous <Continuous>  hydrocortisone hemorrhoidal Suppository 1 Suppository(s) Rectal two times a day  levothyroxine 137 MICROGram(s) Oral daily  lisinopril 2.5 milliGRAM(s) Oral daily  mesalamine Suppository 1000 milliGRAM(s) Rectal at bedtime  metoprolol succinate  milliGRAM(s) Oral daily  midodrine. 10 milliGRAM(s) Oral three times a day  midodrine. 20 milliGRAM(s) Oral <User Schedule> PRN  pantoprazole    Tablet 40 milliGRAM(s) Oral before breakfast  polyethylene glycol 3350 17 Gram(s) Oral daily  senna 2 Tablet(s) Oral at bedtime  simvastatin 40 milliGRAM(s) Oral at bedtime  sodium chloride 0.9% Bolus. 100 milliLiter(s) IV Bolus every 5 minutes PRN      RADIOLOGY, EKG & ADDITIONAL TESTS: Reviewed.

## 2023-08-20 NOTE — PROGRESS NOTE ADULT - SUBJECTIVE AND OBJECTIVE BOX
date of service: 08-20-23 @ 08:34  afebrile  REVIEW OF SYSTEMS:  CONSTITUTIONAL: No fever,  no  weight loss  ENT:  No  tinnitus,   no   vertigo  NECK: No pain or stiffness  RESPIRATORY: No cough, wheezing, chills or hemoptysis;    No Shortness of Breath  CARDIOVASCULAR: No chest pain, palpitations, dizziness  GASTROINTESTINAL: No abdominal or epigastric pain. No nausea, vomiting, or hematemesis; No diarrhea  No melena or hematochezia.  GENITOURINARY: No dysuria, frequency, hematuria, or incontinence  NEUROLOGICAL: No headaches  SKIN: No itching,  no   rash  LYMPH Nodes: No enlarged glands  ENDOCRINE: No heat or cold intolerance  MUSCULOSKELETAL: No joint pain or swelling  PSYCHIATRIC: No depression, anxiety  HEME/LYMPH: No easy bruising, or bleeding gums  ALLERGY AND IMMUNOLOGIC: No hives or eczema	    MEDICATIONS  (STANDING):  ertapenem  IVPB 500 milliGRAM(s) IV Intermittent every 24 hours  escitalopram 5 milliGRAM(s) Oral daily  heparin  Infusion 1200 Unit(s)/Hr (11.5 mL/Hr) IV Continuous <Continuous>  hydrocortisone hemorrhoidal Suppository 1 Suppository(s) Rectal two times a day  levothyroxine 137 MICROGram(s) Oral daily  lisinopril 2.5 milliGRAM(s) Oral daily  mesalamine Suppository 1000 milliGRAM(s) Rectal at bedtime  metoprolol succinate  milliGRAM(s) Oral daily  midodrine. 10 milliGRAM(s) Oral three times a day  pantoprazole    Tablet 40 milliGRAM(s) Oral before breakfast  polyethylene glycol 3350 17 Gram(s) Oral daily  senna 2 Tablet(s) Oral at bedtime  simvastatin 40 milliGRAM(s) Oral at bedtime    MEDICATIONS  (PRN):  aluminum hydroxide/magnesium hydroxide/simethicone Suspension 30 milliLiter(s) Oral every 6 hours PRN Dyspepsia  artificial  tears Solution 1 Drop(s) Both EYES four times a day PRN Dry Eyes  midodrine. 20 milliGRAM(s) Oral <User Schedule> PRN PRIOR TO HD  sodium chloride 0.9% Bolus. 100 milliLiter(s) IV Bolus every 5 minutes PRN SBP LESS THAN or EQUAL to 90 mmHg      Vital Signs Last 24 Hrs  T(C): 36.8 (20 Aug 2023 05:16), Max: 37.1 (19 Aug 2023 19:23)  T(F): 98.2 (20 Aug 2023 05:16), Max: 98.8 (19 Aug 2023 19:23)  HR: 62 (20 Aug 2023 05:16) (62 - 83)  BP: 103/64 (20 Aug 2023 05:16) (94/51 - 114/57)  BP(mean): --  RR: 18 (20 Aug 2023 05:16) (18 - 18)  SpO2: 93% (20 Aug 2023 05:16) (93% - 94%)    Parameters below as of 20 Aug 2023 05:16  Patient On (Oxygen Delivery Method): room air      CAPILLARY BLOOD GLUCOSE        I&O's Summary    19 Aug 2023 07:01  -  20 Aug 2023 07:00  --------------------------------------------------------  IN: 200 mL / OUT: 500 mL / NET: -300 mL          Appearance: Normal	  HEENT:   Normal oral mucosa, PERRL, EOMI	  Lymphatic: No lymphadenopathy  Cardiovascular: Normal S1 S2, No JVD  Respiratory: Lungs clear to auscultation	  Gastrointestinal:  Soft, Non-tender, + BS	  Skin: No rash, No ecchymoses	  Extremities:     LABS:                        9.7    5.90  )-----------( 116      ( 20 Aug 2023 06:50 )             30.7     08-20    131<L>  |  102  |  34<H>  ----------------------------<  74  4.5   |  19<L>  |  2.37<H>    Ca    9.7      20 Aug 2023 06:49      PTT - ( 19 Aug 2023 23:08 )  PTT:60.6 sec      Urinalysis Basic - ( 20 Aug 2023 06:49 )    Color: x / Appearance: x / SG: x / pH: x  Gluc: 74 mg/dL / Ketone: x  / Bili: x / Urobili: x   Blood: x / Protein: x / Nitrite: x   Leuk Esterase: x / RBC: x / WBC x   Sq Epi: x / Non Sq Epi: x / Bacteria: x                      Consultant(s) Notes Reviewed:      Care Discussed with Consultants/Other Providers:

## 2023-08-20 NOTE — PROGRESS NOTE ADULT - ASSESSMENT
A 79 y.o. M with PMHx Significant for HLD, A-fib, BPH, ESRD, CANDY, who presents to the ED with c.o. abd pain, rectal pain, and bloody stools.  Patient is on dialysis MWF. Vascular surgery is consulted for AVF creation. Patient endorses having a pacemaker.    Recommendations:  - Please obtain bilateral vein mapping.  - Please document medical and cardiac clearance and risk stratifications.  - Please protect the Right arm (NO BP cuffs, NO IVs, NO blood withdrawals)  - Rest of care per primary team    Vascular Surgery  [id number]   A 79 y.o. M with PMHx Significant for HLD, A-fib, BPH, ESRD, CANDY, who presents to the ED with c.o. abd pain, rectal pain, and bloody stools.  Patient is on dialysis MWF. Vascular surgery is consulted for AVF creation. Patient endorses having a pacemaker.    Recommendations:  - Please obtain bilateral vein mapping.  - Please document medical and cardiac clearance and risk stratifications.  - Please protect the LEFT arm (NO BP cuffs, NO IVs, NO blood withdrawals)  - Rest of care per primary team    Vascular Surgery  [id number]

## 2023-08-21 LAB
ANION GAP SERPL CALC-SCNC: 8 MMOL/L — SIGNIFICANT CHANGE UP (ref 5–17)
APTT BLD: 34.7 SEC — SIGNIFICANT CHANGE UP (ref 24.5–35.6)
APTT BLD: 69.6 SEC — HIGH (ref 24.5–35.6)
APTT BLD: 83.9 SEC — HIGH (ref 24.5–35.6)
BUN SERPL-MCNC: 38 MG/DL — HIGH (ref 7–23)
CALCIUM SERPL-MCNC: 9.7 MG/DL — SIGNIFICANT CHANGE UP (ref 8.4–10.5)
CHLORIDE SERPL-SCNC: 101 MMOL/L — SIGNIFICANT CHANGE UP (ref 96–108)
CO2 SERPL-SCNC: 26 MMOL/L — SIGNIFICANT CHANGE UP (ref 22–31)
CREAT SERPL-MCNC: 2.87 MG/DL — HIGH (ref 0.5–1.3)
CULTURE RESULTS: SIGNIFICANT CHANGE UP
EGFR: 22 ML/MIN/1.73M2 — LOW
GLUCOSE SERPL-MCNC: 83 MG/DL — SIGNIFICANT CHANGE UP (ref 70–99)
HCT VFR BLD CALC: 30.3 % — LOW (ref 39–50)
HCT VFR BLD CALC: 30.5 % — LOW (ref 39–50)
HCT VFR BLD CALC: 32.5 % — LOW (ref 39–50)
HCT VFR BLD CALC: 35.7 % — LOW (ref 39–50)
HGB BLD-MCNC: 10.2 G/DL — LOW (ref 13–17)
HGB BLD-MCNC: 11.2 G/DL — LOW (ref 13–17)
HGB BLD-MCNC: 9.7 G/DL — LOW (ref 13–17)
HGB BLD-MCNC: 9.8 G/DL — LOW (ref 13–17)
MCHC RBC-ENTMCNC: 28.3 PG — SIGNIFICANT CHANGE UP (ref 27–34)
MCHC RBC-ENTMCNC: 28.4 PG — SIGNIFICANT CHANGE UP (ref 27–34)
MCHC RBC-ENTMCNC: 28.7 PG — SIGNIFICANT CHANGE UP (ref 27–34)
MCHC RBC-ENTMCNC: 28.7 PG — SIGNIFICANT CHANGE UP (ref 27–34)
MCHC RBC-ENTMCNC: 31.4 GM/DL — LOW (ref 32–36)
MCHC RBC-ENTMCNC: 31.4 GM/DL — LOW (ref 32–36)
MCHC RBC-ENTMCNC: 32 GM/DL — SIGNIFICANT CHANGE UP (ref 32–36)
MCHC RBC-ENTMCNC: 32.1 GM/DL — SIGNIFICANT CHANGE UP (ref 32–36)
MCV RBC AUTO: 89.4 FL — SIGNIFICANT CHANGE UP (ref 80–100)
MCV RBC AUTO: 89.6 FL — SIGNIFICANT CHANGE UP (ref 80–100)
MCV RBC AUTO: 90 FL — SIGNIFICANT CHANGE UP (ref 80–100)
MCV RBC AUTO: 90.6 FL — SIGNIFICANT CHANGE UP (ref 80–100)
NRBC # BLD: 0 /100 WBCS — SIGNIFICANT CHANGE UP (ref 0–0)
ORGANISM # SPEC MICROSCOPIC CNT: SIGNIFICANT CHANGE UP
ORGANISM # SPEC MICROSCOPIC CNT: SIGNIFICANT CHANGE UP
PLATELET # BLD AUTO: 120 K/UL — LOW (ref 150–400)
PLATELET # BLD AUTO: 127 K/UL — LOW (ref 150–400)
PLATELET # BLD AUTO: 132 K/UL — LOW (ref 150–400)
PLATELET # BLD AUTO: 139 K/UL — LOW (ref 150–400)
POTASSIUM SERPL-MCNC: 4.3 MMOL/L — SIGNIFICANT CHANGE UP (ref 3.5–5.3)
POTASSIUM SERPL-SCNC: 4.3 MMOL/L — SIGNIFICANT CHANGE UP (ref 3.5–5.3)
RBC # BLD: 3.38 M/UL — LOW (ref 4.2–5.8)
RBC # BLD: 3.41 M/UL — LOW (ref 4.2–5.8)
RBC # BLD: 3.61 M/UL — LOW (ref 4.2–5.8)
RBC # BLD: 3.94 M/UL — LOW (ref 4.2–5.8)
RBC # FLD: 15.8 % — HIGH (ref 10.3–14.5)
RBC # FLD: 15.9 % — HIGH (ref 10.3–14.5)
SODIUM SERPL-SCNC: 135 MMOL/L — SIGNIFICANT CHANGE UP (ref 135–145)
SPECIMEN SOURCE: SIGNIFICANT CHANGE UP
WBC # BLD: 5.87 K/UL — SIGNIFICANT CHANGE UP (ref 3.8–10.5)
WBC # BLD: 5.97 K/UL — SIGNIFICANT CHANGE UP (ref 3.8–10.5)
WBC # BLD: 6.08 K/UL — SIGNIFICANT CHANGE UP (ref 3.8–10.5)
WBC # BLD: 6.32 K/UL — SIGNIFICANT CHANGE UP (ref 3.8–10.5)
WBC # FLD AUTO: 5.87 K/UL — SIGNIFICANT CHANGE UP (ref 3.8–10.5)
WBC # FLD AUTO: 5.97 K/UL — SIGNIFICANT CHANGE UP (ref 3.8–10.5)
WBC # FLD AUTO: 6.08 K/UL — SIGNIFICANT CHANGE UP (ref 3.8–10.5)
WBC # FLD AUTO: 6.32 K/UL — SIGNIFICANT CHANGE UP (ref 3.8–10.5)

## 2023-08-21 PROCEDURE — 93970 EXTREMITY STUDY: CPT | Mod: 26

## 2023-08-21 PROCEDURE — 99232 SBSQ HOSP IP/OBS MODERATE 35: CPT | Mod: GC

## 2023-08-21 PROCEDURE — 93306 TTE W/DOPPLER COMPLETE: CPT | Mod: 26

## 2023-08-21 RX ORDER — HEPARIN SODIUM 5000 [USP'U]/ML
1250 INJECTION INTRAVENOUS; SUBCUTANEOUS
Qty: 25000 | Refills: 0 | Status: DISCONTINUED | OUTPATIENT
Start: 2023-08-21 | End: 2023-08-21

## 2023-08-21 RX ORDER — ERYTHROPOIETIN 10000 [IU]/ML
4000 INJECTION, SOLUTION INTRAVENOUS; SUBCUTANEOUS
Refills: 0 | Status: DISCONTINUED | OUTPATIENT
Start: 2023-08-21 | End: 2023-08-25

## 2023-08-21 RX ORDER — HEPARIN SODIUM 5000 [USP'U]/ML
11.5 INJECTION INTRAVENOUS; SUBCUTANEOUS
Qty: 25000 | Refills: 0 | Status: DISCONTINUED | OUTPATIENT
Start: 2023-08-21 | End: 2023-08-22

## 2023-08-21 RX ADMIN — SIMVASTATIN 40 MILLIGRAM(S): 20 TABLET, FILM COATED ORAL at 22:38

## 2023-08-21 RX ADMIN — ERYTHROPOIETIN 4000 UNIT(S): 10000 INJECTION, SOLUTION INTRAVENOUS; SUBCUTANEOUS at 17:57

## 2023-08-21 RX ADMIN — Medication 1 APPLICATION(S): at 14:09

## 2023-08-21 RX ADMIN — PANTOPRAZOLE SODIUM 40 MILLIGRAM(S): 20 TABLET, DELAYED RELEASE ORAL at 06:12

## 2023-08-21 RX ADMIN — HEPARIN SODIUM 11.5 UNIT(S)/HR: 5000 INJECTION INTRAVENOUS; SUBCUTANEOUS at 16:46

## 2023-08-21 RX ADMIN — Medication 137 MICROGRAM(S): at 05:32

## 2023-08-21 RX ADMIN — MIDODRINE HYDROCHLORIDE 10 MILLIGRAM(S): 2.5 TABLET ORAL at 05:32

## 2023-08-21 RX ADMIN — Medication 100 MILLIGRAM(S): at 05:32

## 2023-08-21 RX ADMIN — HEPARIN SODIUM 12.5 UNIT(S)/HR: 5000 INJECTION INTRAVENOUS; SUBCUTANEOUS at 12:20

## 2023-08-21 RX ADMIN — LISINOPRIL 2.5 MILLIGRAM(S): 2.5 TABLET ORAL at 05:33

## 2023-08-21 RX ADMIN — SENNA PLUS 2 TABLET(S): 8.6 TABLET ORAL at 22:39

## 2023-08-21 RX ADMIN — ERTAPENEM SODIUM 100 MILLIGRAM(S): 1 INJECTION, POWDER, LYOPHILIZED, FOR SOLUTION INTRAMUSCULAR; INTRAVENOUS at 22:38

## 2023-08-21 RX ADMIN — MIDODRINE HYDROCHLORIDE 10 MILLIGRAM(S): 2.5 TABLET ORAL at 12:24

## 2023-08-21 RX ADMIN — HEPARIN SODIUM 12.5 UNIT(S)/HR: 5000 INJECTION INTRAVENOUS; SUBCUTANEOUS at 06:08

## 2023-08-21 RX ADMIN — Medication 1 SUPPOSITORY(S): at 05:33

## 2023-08-21 RX ADMIN — MIDODRINE HYDROCHLORIDE 10 MILLIGRAM(S): 2.5 TABLET ORAL at 19:51

## 2023-08-21 RX ADMIN — Medication 1 APPLICATION(S): at 22:39

## 2023-08-21 RX ADMIN — ESCITALOPRAM OXALATE 5 MILLIGRAM(S): 10 TABLET, FILM COATED ORAL at 12:21

## 2023-08-21 RX ADMIN — Medication 1 APPLICATION(S): at 05:33

## 2023-08-21 NOTE — PROGRESS NOTE ADULT - ASSESSMENT
78  year old male    h/o  CAD s/p stent , asa.     A-fib/ PPM, , hypothyroid, and total left knee replacement    prior ppm  interrogation  with  WCT   Ct chest, retrosternal hematoma.  mod left pl effusion/ has   c/c leg  pain/  h/o  non complaince  with meds       admitted  with  abd pain and rectal bleeding onset 1 day ago.. arrive s form n home     has  no pain  now    c/c   systolic  and  diastolic  chf,,  cardiomyopathy,. Mitral  valvuloplasty ,  h/o  c/c  l/edema  of L  leg    h/o    c/c AFIB,   has  PPM         eliquis   on  hold  now      h/o c/c   Anemia, , hb is  12.  gi   known to  dr joann INFANTE,     cath, with 2 vessel disease,  s/p  CABG and  MVR  surg d r marni      echo,  on 7/2022,  ef  35/ new/ severe  TR,  and ,  cath,   was non  obstructive     s/p   MSSA  bacterinia , in march 2023, completed  iv ancef..    s/p   explant pf  ppm  and  Micra  placement on  3/30/23. dr sunitha rocha      Echo,  3/2023,  ef  25,  mod  AI. severe  TR . RV hypokinesis    BEULAH, from  [facility], dx  recently , ancef / was  switched   to iv vanco,   CKD, on HD,  has  right Permcath.house  renal   s/p  renal bx on 4/ 14.. 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 + , Ig A  Lamda. ,  known to heme  dr dobson.  pe r heme,  no  need  for  b marrow  bx    cxr/  CT chest,  pl  effusion,  will need  HD  gi    f/p,  no  bleeding  now/  nuclear  scan, no  bleeding   bacteremia   Clostridium cadeveris,  iv   ertapenem   rpt  bcx   8/13, Staph  epi/  need  discussion., ? contaminant   given  prior    history  of bacteremia   has Micra/  MVR  rpt bc 8/14,  thus  far, is  negative    per  ID.  plan 10  days  of  iv Ertapenem  then  po  flagyl  on iv heparin/  vascular f/p/  awiating  av fistula     left  eye,  conjuctivitis    on iv ertapenem/  awaiting  vasc surg                   card /  dr mikayla dick       < from: Intra-Operative Transesophageal Echo W or WO Ultrasound Enhancing Agent (03.30.23 @ 12:48) >  --------------------------------------------------------------------------------  PRE-BYPASS FINDINGS  Left Ventricle:  Left ventricular ejection fraction is estimated at 25 to 30%. There is severe kypokinesis in the left ventricle.  Right Ventricle:  Moderately reduced systolic function. Moderately reduced systolic function. There is moderate hypokinesis in the right ventricle.  Left Atrium:  Diffuse smoke visualized in LA.  Aortic Valve:  The aortic valve appears trileaflet. There is moderate aortic regurgitation.  No obvious vegetations visualized  Mitral Valve:  Bioprosthetic valve is present in the mitral position. There is calcification of the mitral valve annulus.  No obvious vegetations visualized.  Tricuspid Valve:  There is moderate-severe tricuspid regurgitation. TR unchanged following lead extraction.  Pulmonic Valve:  There is mild pulmonic regurgitation.  Pericardium:  No pericardial effusion seen. No pericardial effusion visualized before and after lead extraction.  Electronically signed by Jimmy Gambino on 3/30/2023 at 3:00:58 PM  *** Final ***  < end of copied text >

## 2023-08-21 NOTE — PROGRESS NOTE ADULT - PROBLEM SELECTOR PLAN 3
Hgb below goal for ESRD. Please obtain iron studies. Will plan for Epo with HD     If you have any questions, please feel free to contact me  Mustapha Riggs  Nephrology Fellow  [phone]; Prefer [employer] TEAMS  (After 5pm or on weekends please page the on-call fellow).

## 2023-08-21 NOTE — PROGRESS NOTE ADULT - SUBJECTIVE AND OBJECTIVE BOX
[facility] Division of Kidney Diseases & Hypertension  FOLLOW UP NOTE  [phone]--------------------------------------------------------------------------------  Chief Complaint: ESRD on HD MWF    24 hour events/subjective: Pt. was seen and examined today. Overnight events  noted.         PAST HISTORY  --------------------------------------------------------------------------------  No significant changes to PMH, PSH, FHx, SHx, unless otherwise noted    ALLERGIES & MEDICATIONS  --------------------------------------------------------------------------------  Allergies    Myrbetriq (Hives)  tamsulosin (Hives)  alfuzosin (Hives)  levofloxacin (Hives)    Intolerances      Standing Inpatient Medications  ertapenem  IVPB 500 milliGRAM(s) IV Intermittent every 24 hours  erythromycin   Ointment 1 Application(s) Left EYE three times a day  escitalopram 5 milliGRAM(s) Oral daily  heparin  Infusion 1250 Unit(s)/Hr IV Continuous <Continuous>  hydrocortisone hemorrhoidal Suppository 1 Suppository(s) Rectal two times a day  levothyroxine 137 MICROGram(s) Oral daily  lisinopril 2.5 milliGRAM(s) Oral daily  mesalamine Suppository 1000 milliGRAM(s) Rectal at bedtime  metoprolol succinate  milliGRAM(s) Oral daily  midodrine. 10 milliGRAM(s) Oral three times a day  pantoprazole    Tablet 40 milliGRAM(s) Oral before breakfast  polyethylene glycol 3350 17 Gram(s) Oral daily  senna 2 Tablet(s) Oral at bedtime  simvastatin 40 milliGRAM(s) Oral at bedtime    PRN Inpatient Medications  aluminum hydroxide/magnesium hydroxide/simethicone Suspension 30 milliLiter(s) Oral every 6 hours PRN  artificial  tears Solution 1 Drop(s) Both EYES four times a day PRN  midodrine. 20 milliGRAM(s) Oral <User Schedule> PRN  sodium chloride 0.9% Bolus. 100 milliLiter(s) IV Bolus every 5 minutes PRN      REVIEW OF SYSTEMS  --------------------------------------------------------------------------------  per above     VITALS/PHYSICAL EXAM  --------------------------------------------------------------------------------  T(C): 36.6 (08-21-23 @ 04:29), Max: 36.9 (08-20-23 @ 20:30)  HR: 62 (08-21-23 @ 04:29) (61 - 71)  BP: 109/63 (08-21-23 @ 04:29) (109/63 - 128/73)  RR: 18 (08-21-23 @ 04:29) (18 - 18)  SpO2: 95% (08-21-23 @ 04:29) (92% - 95%)  Wt(kg): --        08-20-23 @ 07:01  -  08-21-23 @ 07:00  --------------------------------------------------------  IN: 360 mL / OUT: 800 mL / NET: -440 mL      Physical Exam:  	Gen: NAD  	HEENT: Anicteric  	Pulm: CTA/ BL  	CV: S1S2+  	Abd: Soft, +BS   	Ext: No LE edema B/L  	Neuro: Awake          : Knapp cath+ with clear urine   	Skin: Warm and dry  	Dialysis access: right tunneled cath     LABS/STUDIES  --------------------------------------------------------------------------------              9.7    5.87  >-----------<  132      [08-21-23 @ 04:11]              30.3     135  |  101  |  38  ----------------------------<  83      [08-21-23 @ 04:11]  4.3   |  26  |  2.87        Ca     9.7     [08-21-23 @ 04:11]        PTT: 34.7       [08-21-23 @ 04:11]      Creatinine Trend:  SCr 2.87 [08-21 @ 04:11]  SCr 2.37 [08-20 @ 06:49]  SCr 2.28 [08-19 @ 06:02]  SCr 3.02 [08-18 @ 07:40]  SCr 2.03 [08-17 @ 11:10]    Urinalysis - [08-21-23 @ 04:11]      Color  / Appearance  / SG  / pH       Gluc 83 / Ketone   / Bili  / Urobili        Blood  / Protein  / Leuk Est  / Nitrite       RBC  / WBC  / Hyaline  / Gran  / Sq Epi  / Non Sq Epi  / Bacteria

## 2023-08-21 NOTE — PROGRESS NOTE ADULT - PROBLEM SELECTOR PLAN 2
Pt has responded well to previous UF sessions in between regular HD days  Now will continue with HD per schedule (MWF)  Plan for HD session today

## 2023-08-21 NOTE — PROGRESS NOTE ADULT - ATTENDING COMMENTS
#ESRD - He has been on dialysis since April 2023 - he continues to be dependent on dialysis. BEULAH --> progressed to ESRD now. HD today as planned.   Appreciate vascular surgery consult for AVF eval.       # Medication toxicity monitoring: medication dose reviewed. Please dose medications to CrCl<15    The rest of the recommendations as per fellow's note.    Xochitl Penny MD  Attending Nephrologist  [phone] or via [employer]

## 2023-08-21 NOTE — PROGRESS NOTE ADULT - SUBJECTIVE AND OBJECTIVE BOX
Surgery Daily Progress Note  =====================================================  SUBJECTIVE: A 78y Male Patient seen and examined at bedside on AM rounds.     ALLERGIES:  Myrbetriq (Hives)  tamsulosin (Hives)  alfuzosin (Hives)  levofloxacin (Hives)      --------------------------------------------------------------------------------------    MEDICATIONS:    Neurologic Medications  escitalopram 5 milliGRAM(s) Oral daily    Respiratory Medications    Cardiovascular Medications  lisinopril 2.5 milliGRAM(s) Oral daily  metoprolol succinate  milliGRAM(s) Oral daily  midodrine. 10 milliGRAM(s) Oral three times a day  midodrine. 20 milliGRAM(s) Oral <User Schedule> PRN PRIOR TO HD    Gastrointestinal Medications  aluminum hydroxide/magnesium hydroxide/simethicone Suspension 30 milliLiter(s) Oral every 6 hours PRN Dyspepsia  mesalamine Suppository 1000 milliGRAM(s) Rectal at bedtime  pantoprazole    Tablet 40 milliGRAM(s) Oral before breakfast  polyethylene glycol 3350 17 Gram(s) Oral daily  senna 2 Tablet(s) Oral at bedtime  sodium chloride 0.9% Bolus. 100 milliLiter(s) IV Bolus every 5 minutes PRN SBP LESS THAN or EQUAL to 90 mmHg    Genitourinary Medications    Hematologic/Oncologic Medications  heparin  Infusion 1250 Unit(s)/Hr IV Continuous <Continuous>    Antimicrobial/Immunologic Medications  ertapenem  IVPB 500 milliGRAM(s) IV Intermittent every 24 hours    Endocrine/Metabolic Medications  levothyroxine 137 MICROGram(s) Oral daily  simvastatin 40 milliGRAM(s) Oral at bedtime    Topical/Other Medications  artificial  tears Solution 1 Drop(s) Both EYES four times a day PRN Dry Eyes  erythromycin   Ointment 1 Application(s) Left EYE three times a day  hydrocortisone hemorrhoidal Suppository 1 Suppository(s) Rectal two times a day    --------------------------------------------------------------------------------------    VITAL SIGNS:  Myrbetriq (Hives)  tamsulosin (Hives)  alfuzosin (Hives)  levofloxacin (Hives)      T(C): 36.6 (08-21-23 @ 04:29), Max: 36.9 (08-20-23 @ 20:30)  HR: 62 (08-21-23 @ 04:29) (61 - 71)  BP: 109/63 (08-21-23 @ 04:29) (99/61 - 128/73)  RR: 18 (08-21-23 @ 04:29) (18 - 18)  SpO2: 95% (08-21-23 @ 04:29) (92% - 95%)  --------------------------------------------------------------------------------------    PHYSICAL EXAM:   General: NAD, Lying in bed comfortably  Neuro: A+Ox3  HEENT: NC/AT, EOMI  Neck: Soft, supple  Cardio: RRR, nml S1/S2  Resp: Good effort, CTA b/l  GI/Abd: Soft, NT/ND, no rebound/guarding, no masses palpated  Vascular: Bilateral arms are warm.   Skin: Intact, no breakdown    --------------------------------------------------------------------------------------    I&Os  T(C): 36.6 (08-21-23 @ 04:29), Max: 36.9 (08-20-23 @ 20:30)  HR: 62 (08-21-23 @ 04:29) (61 - 71)  BP: 109/63 (08-21-23 @ 04:29) (99/61 - 128/73)  RR: 18 (08-21-23 @ 04:29) (18 - 18)  SpO2: 95% (08-21-23 @ 04:29) (92% - 95%)  --------------------------------------------------------------------------------------    LABS                          9.7    5.87  )-----------( 132      ( 21 Aug 2023 04:11 )             30.3     08-21    135  |  101  |  38<H>  ----------------------------<  83  4.3   |  26  |  2.87<H>    Ca    9.7      21 Aug 2023 04:11      PTT - ( 21 Aug 2023 04:11 )  PTT:34.7 sec  Urinalysis Basic - ( 21 Aug 2023 04:11 )    Color: x / Appearance: x / SG: x / pH: x  Gluc: 83 mg/dL / Ketone: x  / Bili: x / Urobili: x   Blood: x / Protein: x / Nitrite: x   Leuk Esterase: x / RBC: x / WBC x   Sq Epi: x / Non Sq Epi: x / Bacteria: x        08-20-23 @ 07:01  -  08-21-23 @ 07:00  --------------------------------------------------------  IN: 360 mL / OUT: 800 mL / NET: -440 mL      aluminum hydroxide/magnesium hydroxide/simethicone Suspension 30 milliLiter(s) Oral every 6 hours PRN  artificial  tears Solution 1 Drop(s) Both EYES four times a day PRN  ertapenem  IVPB 500 milliGRAM(s) IV Intermittent every 24 hours  erythromycin   Ointment 1 Application(s) Left EYE three times a day  escitalopram 5 milliGRAM(s) Oral daily  heparin  Infusion 1250 Unit(s)/Hr IV Continuous <Continuous>  hydrocortisone hemorrhoidal Suppository 1 Suppository(s) Rectal two times a day  levothyroxine 137 MICROGram(s) Oral daily  lisinopril 2.5 milliGRAM(s) Oral daily  mesalamine Suppository 1000 milliGRAM(s) Rectal at bedtime  metoprolol succinate  milliGRAM(s) Oral daily  midodrine. 20 milliGRAM(s) Oral <User Schedule> PRN  midodrine. 10 milliGRAM(s) Oral three times a day  pantoprazole    Tablet 40 milliGRAM(s) Oral before breakfast  polyethylene glycol 3350 17 Gram(s) Oral daily  senna 2 Tablet(s) Oral at bedtime  simvastatin 40 milliGRAM(s) Oral at bedtime  sodium chloride 0.9% Bolus. 100 milliLiter(s) IV Bolus every 5 minutes PRN      RADIOLOGY, EKG & ADDITIONAL TESTS: Reviewed.  Surgery Daily Progress Note  =====================================================  SUBJECTIVE: A 78y Male Patient seen and examined at bedside on AM rounds. Patient does not have any complaints.    ALLERGIES:  Myrbetriq (Hives)  tamsulosin (Hives)  alfuzosin (Hives)  levofloxacin (Hives)      --------------------------------------------------------------------------------------    MEDICATIONS:    Neurologic Medications  escitalopram 5 milliGRAM(s) Oral daily    Respiratory Medications    Cardiovascular Medications  lisinopril 2.5 milliGRAM(s) Oral daily  metoprolol succinate  milliGRAM(s) Oral daily  midodrine. 10 milliGRAM(s) Oral three times a day  midodrine. 20 milliGRAM(s) Oral <User Schedule> PRN PRIOR TO HD    Gastrointestinal Medications  aluminum hydroxide/magnesium hydroxide/simethicone Suspension 30 milliLiter(s) Oral every 6 hours PRN Dyspepsia  mesalamine Suppository 1000 milliGRAM(s) Rectal at bedtime  pantoprazole    Tablet 40 milliGRAM(s) Oral before breakfast  polyethylene glycol 3350 17 Gram(s) Oral daily  senna 2 Tablet(s) Oral at bedtime  sodium chloride 0.9% Bolus. 100 milliLiter(s) IV Bolus every 5 minutes PRN SBP LESS THAN or EQUAL to 90 mmHg    Genitourinary Medications    Hematologic/Oncologic Medications  heparin  Infusion 1250 Unit(s)/Hr IV Continuous <Continuous>    Antimicrobial/Immunologic Medications  ertapenem  IVPB 500 milliGRAM(s) IV Intermittent every 24 hours    Endocrine/Metabolic Medications  levothyroxine 137 MICROGram(s) Oral daily  simvastatin 40 milliGRAM(s) Oral at bedtime    Topical/Other Medications  artificial  tears Solution 1 Drop(s) Both EYES four times a day PRN Dry Eyes  erythromycin   Ointment 1 Application(s) Left EYE three times a day  hydrocortisone hemorrhoidal Suppository 1 Suppository(s) Rectal two times a day    --------------------------------------------------------------------------------------    VITAL SIGNS:  Myrbetriq (Hives)  tamsulosin (Hives)  alfuzosin (Hives)  levofloxacin (Hives)      T(C): 36.6 (08-21-23 @ 04:29), Max: 36.9 (08-20-23 @ 20:30)  HR: 62 (08-21-23 @ 04:29) (61 - 71)  BP: 109/63 (08-21-23 @ 04:29) (99/61 - 128/73)  RR: 18 (08-21-23 @ 04:29) (18 - 18)  SpO2: 95% (08-21-23 @ 04:29) (92% - 95%)  --------------------------------------------------------------------------------------    PHYSICAL EXAM:   General: NAD, Lying in bed comfortably  Neuro: A+Ox3  HEENT: NC/AT, EOMI  Neck: Soft, supple  Cardio: RRR, nml S1/S2  Resp: Good effort, CTA b/l  GI/Abd: Soft, NT/ND, no rebound/guarding, no masses palpated  Vascular: Bilateral arms are warm.   Skin: Intact, no breakdown    --------------------------------------------------------------------------------------    I&Os  T(C): 36.6 (08-21-23 @ 04:29), Max: 36.9 (08-20-23 @ 20:30)  HR: 62 (08-21-23 @ 04:29) (61 - 71)  BP: 109/63 (08-21-23 @ 04:29) (99/61 - 128/73)  RR: 18 (08-21-23 @ 04:29) (18 - 18)  SpO2: 95% (08-21-23 @ 04:29) (92% - 95%)  --------------------------------------------------------------------------------------    LABS                          9.7    5.87  )-----------( 132      ( 21 Aug 2023 04:11 )             30.3     08-21    135  |  101  |  38<H>  ----------------------------<  83  4.3   |  26  |  2.87<H>    Ca    9.7      21 Aug 2023 04:11      PTT - ( 21 Aug 2023 04:11 )  PTT:34.7 sec  Urinalysis Basic - ( 21 Aug 2023 04:11 )    Color: x / Appearance: x / SG: x / pH: x  Gluc: 83 mg/dL / Ketone: x  / Bili: x / Urobili: x   Blood: x / Protein: x / Nitrite: x   Leuk Esterase: x / RBC: x / WBC x   Sq Epi: x / Non Sq Epi: x / Bacteria: x        08-20-23 @ 07:01  -  08-21-23 @ 07:00  --------------------------------------------------------  IN: 360 mL / OUT: 800 mL / NET: -440 mL      aluminum hydroxide/magnesium hydroxide/simethicone Suspension 30 milliLiter(s) Oral every 6 hours PRN  artificial  tears Solution 1 Drop(s) Both EYES four times a day PRN  ertapenem  IVPB 500 milliGRAM(s) IV Intermittent every 24 hours  erythromycin   Ointment 1 Application(s) Left EYE three times a day  escitalopram 5 milliGRAM(s) Oral daily  heparin  Infusion 1250 Unit(s)/Hr IV Continuous <Continuous>  hydrocortisone hemorrhoidal Suppository 1 Suppository(s) Rectal two times a day  levothyroxine 137 MICROGram(s) Oral daily  lisinopril 2.5 milliGRAM(s) Oral daily  mesalamine Suppository 1000 milliGRAM(s) Rectal at bedtime  metoprolol succinate  milliGRAM(s) Oral daily  midodrine. 20 milliGRAM(s) Oral <User Schedule> PRN  midodrine. 10 milliGRAM(s) Oral three times a day  pantoprazole    Tablet 40 milliGRAM(s) Oral before breakfast  polyethylene glycol 3350 17 Gram(s) Oral daily  senna 2 Tablet(s) Oral at bedtime  simvastatin 40 milliGRAM(s) Oral at bedtime  sodium chloride 0.9% Bolus. 100 milliLiter(s) IV Bolus every 5 minutes PRN      RADIOLOGY, EKG & ADDITIONAL TESTS: Reviewed.

## 2023-08-21 NOTE — PROGRESS NOTE ADULT - SUBJECTIVE AND OBJECTIVE BOX
Date of Service: 08-21-23 @ 07:27           CARDIOLOGY     PROGRESS  NOTE   ________________________________________________    CHIEF COMPLAINT:Patient is a 78y old  Male who presents with a chief complaint of abdominal pain/ rectal bleed (20 Aug 2023 08:33)  no complain  	  REVIEW OF SYSTEMS:  CONSTITUTIONAL: No fever, weight loss, or fatigue  EYES: No eye pain, visual disturbances, or discharge  ENT:  No difficulty hearing, tinnitus, vertigo; No sinus or throat pain  NECK: No pain or stiffness  RESPIRATORY: No cough, wheezing, chills or hemoptysis; No Shortness of Breath  CARDIOVASCULAR: No chest pain, palpitations, passing out, dizziness, or leg swelling  GASTROINTESTINAL: No abdominal or epigastric pain. No nausea, vomiting, or hematemesis; No diarrhea or constipation. No melena or hematochezia.  GENITOURINARY: No dysuria, frequency, hematuria, or incontinence  NEUROLOGICAL: No headaches, memory loss, loss of strength, numbness, or tremors  SKIN: No itching, burning, rashes, or lesions   LYMPH Nodes: No enlarged glands  ENDOCRINE: No heat or cold intolerance; No hair loss  MUSCULOSKELETAL: No joint pain or swelling; No muscle, back, or extremity pain  PSYCHIATRIC: No depression, anxiety, mood swings, or difficulty sleeping  HEME/LYMPH: No easy bruising, or bleeding gums  ALLERGY AND IMMUNOLOGIC: No hives or eczema	    [x ] All others negative	  [ ] Unable to obtain    PHYSICAL EXAM:  T(C): 36.6 (08-21-23 @ 04:29), Max: 36.9 (08-20-23 @ 20:30)  HR: 62 (08-21-23 @ 04:29) (61 - 71)  BP: 109/63 (08-21-23 @ 04:29) (99/61 - 128/73)  RR: 18 (08-21-23 @ 04:29) (18 - 18)  SpO2: 95% (08-21-23 @ 04:29) (92% - 95%)  Wt(kg): --  I&O's Summary    20 Aug 2023 07:01  -  21 Aug 2023 07:00  --------------------------------------------------------  IN: 360 mL / OUT: 800 mL / NET: -440 mL        Appearance: Normal	  HEENT:   Normal oral mucosa, PERRL, EOMI	  Lymphatic: No lymphadenopathy  Cardiovascular: Normal S1 S2, No JVD, + murmurs, No edema  Respiratory: rhonchi  Psychiatry: A & O x 3, Mood & affect appropriate  Gastrointestinal:  Soft, Non-tender, + BS	  Skin: No rashes, No ecchymoses, No cyanosis	  Neurologic: Non-focal  Extremities: Normal range of motion, No clubbing, cyanosis or edema  Vascular: Peripheral pulses palpable 2+ bilaterally    MEDICATIONS  (STANDING):  ertapenem  IVPB 500 milliGRAM(s) IV Intermittent every 24 hours  erythromycin   Ointment 1 Application(s) Left EYE three times a day  escitalopram 5 milliGRAM(s) Oral daily  heparin  Infusion 1250 Unit(s)/Hr (12.5 mL/Hr) IV Continuous <Continuous>  hydrocortisone hemorrhoidal Suppository 1 Suppository(s) Rectal two times a day  levothyroxine 137 MICROGram(s) Oral daily  lisinopril 2.5 milliGRAM(s) Oral daily  mesalamine Suppository 1000 milliGRAM(s) Rectal at bedtime  metoprolol succinate  milliGRAM(s) Oral daily  midodrine. 10 milliGRAM(s) Oral three times a day  pantoprazole    Tablet 40 milliGRAM(s) Oral before breakfast  polyethylene glycol 3350 17 Gram(s) Oral daily  senna 2 Tablet(s) Oral at bedtime  simvastatin 40 milliGRAM(s) Oral at bedtime      TELEMETRY: 	    ECG:  	  RADIOLOGY:  OTHER: 	  	  LABS:	 	    CARDIAC MARKERS:                                9.7    5.87  )-----------( 132      ( 21 Aug 2023 04:11 )             30.3     08-21    135  |  101  |  38<H>  ----------------------------<  83  4.3   |  26  |  2.87<H>    Ca    9.7      21 Aug 2023 04:11      proBNP:   Lipid Profile:   HgA1c:   TSH:   PTT - ( 21 Aug 2023 04:11 )  PTT:34.7 sec      Assessment and plan  ---------------------------  Liam Garza is 79 y.o. M with PMHx Significant for HLD, A-fib, BPH, ESRD, CANDY, who presents to the ED with c.o. abd pain, rectal pain, and bloody stools.  Per patient, he has been experiencing abd pain and rectal bleeding onset 1 day ago.  Patient notes pain is localized to the suprapubic area, radiates to the RLQ.  He notes pain is intermittent and has had minimal improvement with Tylenol.  Nothing seems to exacerbate his pain.  His only other associated symptom is chills.  Otherwise he denies fevers, N/V, HA, changes in his vision, sore throat, SOB, CP, diarrhea or constipation. Blood per rectum is accumulated in his depends. Of note, patient notes rectal pain began in May which she describes as sharp and has been constant since onset.  pt with hx of chronic a.fib, chf systolic EF 35 to 40% s/p ppm ,MICRA sec to ppm exrtacction sec to + BC.  sob sec acute on chronic chf systolc, fluid overload  renal eval possible HD today  ?rectal bleed hold AC, GI eval  ASHD / A. FIb continue beta blocker   repeat echo with hx of pericardial effusion  clear liquid diet  ct ?colitis/ + ua start on abx/ ID noted, awaiting culture  + BC ID follow up discussed with ACP  GI/ MED appreciated  pulmonary edema/ chf, repeat echo check MV, continue HD with increase fluid withdrawal as tolerated  will add ace if bp can tolerate  started on iv heparin yesterday, fu hgb, if no bleeding will switch to NOAC will hold for now  advance diet as  tolerated  doing better, oob to chair/ physical therapy  blood cultures are still +, ID appreciated awaiting ECHO concern about bioprosthetic MV, may need ERENDIRA  doing much better clinically, may switch iv heparin to NOAC if no procedure needed, hgb stable  ID / renal appreciated, ?vascular for AV fistulae placement, but bacteriemia needs to be resolved  dc planning after total of 10 days of abx  add lisinopril 2.5 mg daily  if no improvement in Ef pt will need AICD in 3 months as per primary prevention  vascular consulted for  av matthew/ fistulae, appreciated /vein mapping  repeat chestt x ray to check for pulmonary edema and pleural effusion  oob to chair/ physical therapy  continue IV abx  awaiting vascular recommendation  ?delgadillo voiding trial

## 2023-08-21 NOTE — PROGRESS NOTE ADULT - SUBJECTIVE AND OBJECTIVE BOX
date of service: 08-21-23 @ 08:56  afberile  REVIEW OF SYSTEMS:  CONSTITUTIONAL: No fever,  no  weight loss  ENT:  No  tinnitus,   no   vertigo  NECK: No pain or stiffness  RESPIRATORY: No cough, wheezing, chills or hemoptysis;    No Shortness of Breath  CARDIOVASCULAR: No chest pain, palpitations, dizziness  GASTROINTESTINAL: No abdominal or epigastric pain. No nausea, vomiting, or hematemesis; No diarrhea  No melena or hematochezia.  GENITOURINARY: No dysuria, frequency, hematuria, or incontinence  NEUROLOGICAL: No headaches  SKIN: No itching,  no   rash  LYMPH Nodes: No enlarged glands  ENDOCRINE: No heat or cold intolerance  MUSCULOSKELETAL: No joint pain or swelling  PSYCHIATRIC: No depression, anxiety  HEME/LYMPH: No easy bruising, or bleeding gums  ALLERGY AND IMMUNOLOGIC: No hives or eczema	    MEDICATIONS  (STANDING):  ertapenem  IVPB 500 milliGRAM(s) IV Intermittent every 24 hours  erythromycin   Ointment 1 Application(s) Left EYE three times a day  escitalopram 5 milliGRAM(s) Oral daily  heparin  Infusion 1250 Unit(s)/Hr (12.5 mL/Hr) IV Continuous <Continuous>  hydrocortisone hemorrhoidal Suppository 1 Suppository(s) Rectal two times a day  levothyroxine 137 MICROGram(s) Oral daily  lisinopril 2.5 milliGRAM(s) Oral daily  mesalamine Suppository 1000 milliGRAM(s) Rectal at bedtime  metoprolol succinate  milliGRAM(s) Oral daily  midodrine. 10 milliGRAM(s) Oral three times a day  pantoprazole    Tablet 40 milliGRAM(s) Oral before breakfast  polyethylene glycol 3350 17 Gram(s) Oral daily  senna 2 Tablet(s) Oral at bedtime  simvastatin 40 milliGRAM(s) Oral at bedtime    MEDICATIONS  (PRN):  aluminum hydroxide/magnesium hydroxide/simethicone Suspension 30 milliLiter(s) Oral every 6 hours PRN Dyspepsia  artificial  tears Solution 1 Drop(s) Both EYES four times a day PRN Dry Eyes  midodrine. 20 milliGRAM(s) Oral <User Schedule> PRN PRIOR TO HD  sodium chloride 0.9% Bolus. 100 milliLiter(s) IV Bolus every 5 minutes PRN SBP LESS THAN or EQUAL to 90 mmHg      Vital Signs Last 24 Hrs  T(C): 36.6 (21 Aug 2023 04:29), Max: 36.9 (20 Aug 2023 20:30)  T(F): 97.8 (21 Aug 2023 04:29), Max: 98.5 (20 Aug 2023 20:30)  HR: 62 (21 Aug 2023 04:29) (61 - 71)  BP: 109/63 (21 Aug 2023 04:29) (99/61 - 128/73)  BP(mean): --  RR: 18 (21 Aug 2023 04:29) (18 - 18)  SpO2: 95% (21 Aug 2023 04:29) (92% - 95%)    Parameters below as of 21 Aug 2023 04:29  Patient On (Oxygen Delivery Method): nasal cannula  O2 Flow (L/min): 2    CAPILLARY BLOOD GLUCOSE        I&O's Summary    20 Aug 2023 07:01  -  21 Aug 2023 07:00  --------------------------------------------------------  IN: 360 mL / OUT: 800 mL / NET: -440 mL          Appearance: Normal	  HEENT:   Normal oral mucosa, PERRL, EOMI	  Lymphatic: No lymphadenopathy  Cardiovascular: Normal S1 S2, No JVD  Respiratory: Lungs clear to auscultation	  Gastrointestinal:  Soft, Non-tender, + BS	  Skin: No rash, No ecchymoses	  Extremities:     LABS:                        9.7    5.87  )-----------( 132      ( 21 Aug 2023 04:11 )             30.3     08-21    135  |  101  |  38<H>  ----------------------------<  83  4.3   |  26  |  2.87<H>    Ca    9.7      21 Aug 2023 04:11      PTT - ( 21 Aug 2023 04:11 )  PTT:34.7 sec      Urinalysis Basic - ( 21 Aug 2023 04:11 )    Color: x / Appearance: x / SG: x / pH: x  Gluc: 83 mg/dL / Ketone: x  / Bili: x / Urobili: x   Blood: x / Protein: x / Nitrite: x   Leuk Esterase: x / RBC: x / WBC x   Sq Epi: x / Non Sq Epi: x / Bacteria: x                      Consultant(s) Notes Reviewed:      Care Discussed with Consultants/Other Providers:

## 2023-08-21 NOTE — PROGRESS NOTE ADULT - ASSESSMENT
BEULAH on HD (M/W/F) via [facility], had extra UF session for volume overload, now back to MWF. 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

## 2023-08-21 NOTE — PROGRESS NOTE ADULT - ASSESSMENT
A 79 y.o. M with PMHx Significant for HLD, A-fib, BPH, ESRD, CANDY, who presents to the ED with c.o. abd pain, rectal pain, and bloody stools.  Patient is on dialysis MWF. Vascular surgery is consulted for AVF creation. Patient endorses having a pacemaker.    Recommendations:  - Please obtain bilateral vein mapping.  - Please document medical and cardiac clearance and risk stratifications.  - Please protect the LEFT arm (NO BP cuffs, NO IVs, NO blood withdrawals)  - Rest of care per primary team    Vascular Surgery  [id number]

## 2023-08-22 ENCOUNTER — APPOINTMENT (OUTPATIENT)
Dept: INTERVENTIONAL RADIOLOGY/VASCULAR | Facility: CLINIC | Age: 78
End: 2023-08-22

## 2023-08-22 DIAGNOSIS — K62.5 HEMORRHAGE OF ANUS AND RECTUM: ICD-10-CM

## 2023-08-22 LAB
ANION GAP SERPL CALC-SCNC: 9 MMOL/L — SIGNIFICANT CHANGE UP (ref 5–17)
APTT BLD: 57.2 SEC — HIGH (ref 24.5–35.6)
APTT BLD: 70.2 SEC — HIGH (ref 24.5–35.6)
APTT BLD: 74.4 SEC — HIGH (ref 24.5–35.6)
APTT BLD: 85.2 SEC — HIGH (ref 24.5–35.6)
BUN SERPL-MCNC: 23 MG/DL — SIGNIFICANT CHANGE UP (ref 7–23)
CALCIUM SERPL-MCNC: 9.2 MG/DL — SIGNIFICANT CHANGE UP (ref 8.4–10.5)
CHLORIDE SERPL-SCNC: 101 MMOL/L — SIGNIFICANT CHANGE UP (ref 96–108)
CO2 SERPL-SCNC: 27 MMOL/L — SIGNIFICANT CHANGE UP (ref 22–31)
CREAT SERPL-MCNC: 2 MG/DL — HIGH (ref 0.5–1.3)
EGFR: 34 ML/MIN/1.73M2 — LOW
GLUCOSE SERPL-MCNC: 100 MG/DL — HIGH (ref 70–99)
HCT VFR BLD CALC: 30.4 % — LOW (ref 39–50)
HCT VFR BLD CALC: 31.4 % — LOW (ref 39–50)
HGB BLD-MCNC: 10.1 G/DL — LOW (ref 13–17)
HGB BLD-MCNC: 9.8 G/DL — LOW (ref 13–17)
MCHC RBC-ENTMCNC: 28.7 PG — SIGNIFICANT CHANGE UP (ref 27–34)
MCHC RBC-ENTMCNC: 29.2 PG — SIGNIFICANT CHANGE UP (ref 27–34)
MCHC RBC-ENTMCNC: 32.2 GM/DL — SIGNIFICANT CHANGE UP (ref 32–36)
MCHC RBC-ENTMCNC: 32.2 GM/DL — SIGNIFICANT CHANGE UP (ref 32–36)
MCV RBC AUTO: 88.9 FL — SIGNIFICANT CHANGE UP (ref 80–100)
MCV RBC AUTO: 90.8 FL — SIGNIFICANT CHANGE UP (ref 80–100)
NRBC # BLD: 0 /100 WBCS — SIGNIFICANT CHANGE UP (ref 0–0)
NRBC # BLD: 0 /100 WBCS — SIGNIFICANT CHANGE UP (ref 0–0)
PLATELET # BLD AUTO: 113 K/UL — LOW (ref 150–400)
PLATELET # BLD AUTO: 126 K/UL — LOW (ref 150–400)
POTASSIUM SERPL-MCNC: 3.6 MMOL/L — SIGNIFICANT CHANGE UP (ref 3.5–5.3)
POTASSIUM SERPL-SCNC: 3.6 MMOL/L — SIGNIFICANT CHANGE UP (ref 3.5–5.3)
RBC # BLD: 3.42 M/UL — LOW (ref 4.2–5.8)
RBC # BLD: 3.46 M/UL — LOW (ref 4.2–5.8)
RBC # FLD: 16 % — HIGH (ref 10.3–14.5)
RBC # FLD: 16.2 % — HIGH (ref 10.3–14.5)
SODIUM SERPL-SCNC: 137 MMOL/L — SIGNIFICANT CHANGE UP (ref 135–145)
WBC # BLD: 5.41 K/UL — SIGNIFICANT CHANGE UP (ref 3.8–10.5)
WBC # BLD: 6.35 K/UL — SIGNIFICANT CHANGE UP (ref 3.8–10.5)
WBC # FLD AUTO: 5.41 K/UL — SIGNIFICANT CHANGE UP (ref 3.8–10.5)
WBC # FLD AUTO: 6.35 K/UL — SIGNIFICANT CHANGE UP (ref 3.8–10.5)

## 2023-08-22 RX ORDER — HEPARIN SODIUM 5000 [USP'U]/ML
1100 INJECTION INTRAVENOUS; SUBCUTANEOUS
Qty: 25000 | Refills: 0 | Status: DISCONTINUED | OUTPATIENT
Start: 2023-08-22 | End: 2023-08-25

## 2023-08-22 RX ADMIN — HEPARIN SODIUM 11.5 UNIT(S)/HR: 5000 INJECTION INTRAVENOUS; SUBCUTANEOUS at 02:35

## 2023-08-22 RX ADMIN — Medication 100 MILLIGRAM(S): at 05:55

## 2023-08-22 RX ADMIN — MIDODRINE HYDROCHLORIDE 10 MILLIGRAM(S): 2.5 TABLET ORAL at 13:29

## 2023-08-22 RX ADMIN — HEPARIN SODIUM 11 UNIT(S)/HR: 5000 INJECTION INTRAVENOUS; SUBCUTANEOUS at 05:56

## 2023-08-22 RX ADMIN — PANTOPRAZOLE SODIUM 40 MILLIGRAM(S): 20 TABLET, DELAYED RELEASE ORAL at 06:01

## 2023-08-22 RX ADMIN — Medication 1 APPLICATION(S): at 22:16

## 2023-08-22 RX ADMIN — HEPARIN SODIUM 10.5 UNIT(S)/HR: 5000 INJECTION INTRAVENOUS; SUBCUTANEOUS at 19:39

## 2023-08-22 RX ADMIN — Medication 1 SUPPOSITORY(S): at 05:56

## 2023-08-22 RX ADMIN — Medication 1 APPLICATION(S): at 05:55

## 2023-08-22 RX ADMIN — SIMVASTATIN 40 MILLIGRAM(S): 20 TABLET, FILM COATED ORAL at 22:16

## 2023-08-22 RX ADMIN — HEPARIN SODIUM 11 UNIT(S)/HR: 5000 INJECTION INTRAVENOUS; SUBCUTANEOUS at 08:07

## 2023-08-22 RX ADMIN — HEPARIN SODIUM 10.5 UNIT(S)/HR: 5000 INJECTION INTRAVENOUS; SUBCUTANEOUS at 18:40

## 2023-08-22 RX ADMIN — MIDODRINE HYDROCHLORIDE 10 MILLIGRAM(S): 2.5 TABLET ORAL at 05:58

## 2023-08-22 RX ADMIN — ESCITALOPRAM OXALATE 5 MILLIGRAM(S): 10 TABLET, FILM COATED ORAL at 13:29

## 2023-08-22 RX ADMIN — MIDODRINE HYDROCHLORIDE 10 MILLIGRAM(S): 2.5 TABLET ORAL at 16:56

## 2023-08-22 RX ADMIN — Medication 1 APPLICATION(S): at 13:28

## 2023-08-22 RX ADMIN — HEPARIN SODIUM 10.5 UNIT(S)/HR: 5000 INJECTION INTRAVENOUS; SUBCUTANEOUS at 11:52

## 2023-08-22 RX ADMIN — Medication 137 MICROGRAM(S): at 05:55

## 2023-08-22 RX ADMIN — LISINOPRIL 2.5 MILLIGRAM(S): 2.5 TABLET ORAL at 05:56

## 2023-08-22 NOTE — DIETITIAN INITIAL EVALUATION ADULT - ADD RECOMMEND
1. continue current diet as tolerated of: renal diet  2. encourage PO intake, protein source with each meal, supplement intake as ordered as tolerated  3. may have 2x protein sources with meals to help support extra caloric/protein intake  4. patient amenable for trial of Nepro Shake once daily to help provide extra calories and protein  5. monitor PO intake, weight trend, electrolytes, blood glucose levels, labs, BMs

## 2023-08-22 NOTE — ASSESSMENT
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  1.  Internal Hemorrhoids  - pt reports rectal bleeding & rectal pain for several years which is compromising overall quality of life - referred by Dr Hayes for angiogram +/- internal hemorrhoidal  embolization - I reviewed the procedure, including the risks (non-targeted embolization, ALYCIA, bleeding, infection, access site bleeding/ecchymosis/pseudoaneurysm), benefits, alternatives, and expected post procedure course (rectal spotting / some bleeding lasting 1-3 months, mild to moderate rectal pain initially) - performed with sedation - access is typically via right CFA - procedure is typically 1 hour in duration - coils will be utilized as embolic material - discussed success rate of procedure is 85 - 90 %  - one time dose of antibiotics to be administered immediately pre op  - PACU stay is 2-4 hours - IR f/u 2 wks post procedure - colorectal f/u as per Dr Hayes  2.  CAD, MVP, Pacemaker  - denies cardiac symptomatology - managed by Dr Chaudhry - s/p PPM implant in 2012 for tachybrady syndrome s/p removal & leadless PPM replacement on 3/93/23 - hx of cardiac stent x   in - hx of CABG x 2 V  in - on ASA,& Xarelto regimen - will discuss feasibility of holding Xarelto 2 days pre op to aid in mitigating bleeding risk with Dr  - may remain on ASA for procedure  3. BEULAH - Most recently with admission to hospital in 5/2023 for CHF exacerbation-was noted to have BEULAH and started on HD- Now off HD? - Recent Cr 2.00 eGFR 34 on 8/22/23 -Managed by - ALYCIA prophylaxis however CHF..  Mr. ARCEO's comprehension was confirmed & all questions were asked and answered to his satisfaction. Mr. ARCEO would like to move forward with the proposed procedure.  IR office contact information was provided to the pt. Our office will be in touch to schedule PSTs & procedure.

## 2023-08-22 NOTE — DIETITIAN INITIAL EVALUATION ADULT - PERSON TAUGHT/METHOD
adequate caloric/protein intake w/ food sources reviewed, role of oral nutrition supplements, renal diet, food preferences, all questions were answered/verbal instruction/teach back - (Patient repeats in own words)/patient instructed

## 2023-08-22 NOTE — DIETITIAN INITIAL EVALUATION ADULT - REASON FOR ADMISSION
Gastrointestinal hemorrhage    Per chart, patient is a 78 y/o male with PMH including AFib, BPH, HLD, ESRD on HD, CANDY. Patient presents to [facility] w/ abdominal pain, rectal pain, bloody BMs.

## 2023-08-22 NOTE — PROGRESS NOTE ADULT - SUBJECTIVE AND OBJECTIVE BOX
date of service: 08-22-23 @ 08:57  afberile  REVIEW OF SYSTEMS:  CONSTITUTIONAL: No fever,  no  weight loss  ENT:  No  tinnitus,   no   vertigo  NECK: No pain or stiffness  RESPIRATORY: No cough, wheezing, chills or hemoptysis;    No Shortness of Breath  CARDIOVASCULAR: No chest pain, palpitations, dizziness  GASTROINTESTINAL: No abdominal or epigastric pain. No nausea, vomiting, or hematemesis; No diarrhea  No melena or hematochezia.  GENITOURINARY: No dysuria, frequency, hematuria, or incontinence  NEUROLOGICAL: No headaches  SKIN: No itching,  no   rash  LYMPH Nodes: No enlarged glands  ENDOCRINE: No heat or cold intolerance  MUSCULOSKELETAL: No joint pain or swelling  PSYCHIATRIC: No depression, anxiety  HEME/LYMPH: No easy bruising, or bleeding gums  ALLERGY AND IMMUNOLOGIC: No hives or eczema	    MEDICATIONS  (STANDING):  epoetin val (EPOGEN) Injectable 4000 Unit(s) IV Push <User Schedule>  erythromycin   Ointment 1 Application(s) Left EYE three times a day  escitalopram 5 milliGRAM(s) Oral daily  heparin  Infusion 1100 Unit(s)/Hr (11 mL/Hr) IV Continuous <Continuous>  hydrocortisone hemorrhoidal Suppository 1 Suppository(s) Rectal two times a day  levothyroxine 137 MICROGram(s) Oral daily  lisinopril 2.5 milliGRAM(s) Oral daily  mesalamine Suppository 1000 milliGRAM(s) Rectal at bedtime  metoprolol succinate  milliGRAM(s) Oral daily  midodrine. 10 milliGRAM(s) Oral three times a day  pantoprazole    Tablet 40 milliGRAM(s) Oral before breakfast  polyethylene glycol 3350 17 Gram(s) Oral daily  senna 2 Tablet(s) Oral at bedtime  simvastatin 40 milliGRAM(s) Oral at bedtime    MEDICATIONS  (PRN):  aluminum hydroxide/magnesium hydroxide/simethicone Suspension 30 milliLiter(s) Oral every 6 hours PRN Dyspepsia  artificial  tears Solution 1 Drop(s) Both EYES four times a day PRN Dry Eyes  midodrine. 20 milliGRAM(s) Oral <User Schedule> PRN PRIOR TO HD  sodium chloride 0.9% Bolus. 100 milliLiter(s) IV Bolus every 5 minutes PRN SBP LESS THAN or EQUAL to 90 mmHg      Vital Signs Last 24 Hrs  T(C): 36.3 (22 Aug 2023 04:31), Max: 36.6 (22 Aug 2023 00:51)  T(F): 97.4 (22 Aug 2023 04:31), Max: 97.8 (22 Aug 2023 00:51)  HR: 71 (22 Aug 2023 04:31) (51 - 71)  BP: 115/67 (22 Aug 2023 04:31) (101/57 - 122/71)  BP(mean): --  RR: 18 (22 Aug 2023 04:31) (17 - 18)  SpO2: 95% (22 Aug 2023 04:31) (93% - 99%)    Parameters below as of 22 Aug 2023 04:31  Patient On (Oxygen Delivery Method): nasal cannula  O2 Flow (L/min): 2    CAPILLARY BLOOD GLUCOSE        I&O's Summary    21 Aug 2023 07:01  -  22 Aug 2023 07:00  --------------------------------------------------------  IN: 1080 mL / OUT: 2600 mL / NET: -1520 mL          Appearance: Normal	  HEENT:   Normal oral mucosa, PERRL, EOMI	  Lymphatic: No lymphadenopathy  Cardiovascular: Normal S1 S2, No JVD  Respiratory: Lungs clear to auscultation	  Gastrointestinal:  Soft, Non-tender, + BS	  Skin: No rash, No ecchymoses	  Extremities:     LABS:                        9.8    5.41  )-----------( 113      ( 22 Aug 2023 04:31 )             30.4     08-22    137  |  101  |  23  ----------------------------<  100<H>  3.6   |  27  |  2.00<H>    Ca    9.2      22 Aug 2023 04:31      PTT - ( 22 Aug 2023 04:31 )  PTT:74.4 sec      Urinalysis Basic - ( 22 Aug 2023 04:31 )    Color: x / Appearance: x / SG: x / pH: x  Gluc: 100 mg/dL / Ketone: x  / Bili: x / Urobili: x   Blood: x / Protein: x / Nitrite: x   Leuk Esterase: x / RBC: x / WBC x   Sq Epi: x / Non Sq Epi: x / Bacteria: x                      Consultant(s) Notes Reviewed:      Care Discussed with Consultants/Other Providers:

## 2023-08-22 NOTE — DIETITIAN INITIAL EVALUATION ADULT - PERTINENT MEDS FT
MEDICATIONS  (STANDING):  epoetin val (EPOGEN) Injectable 4000 Unit(s) IV Push <User Schedule>  erythromycin   Ointment 1 Application(s) Left EYE three times a day  escitalopram 5 milliGRAM(s) Oral daily  heparin  Infusion 1100 Unit(s)/Hr (10.5 mL/Hr) IV Continuous <Continuous>  hydrocortisone hemorrhoidal Suppository 1 Suppository(s) Rectal two times a day  levothyroxine 137 MICROGram(s) Oral daily  lisinopril 2.5 milliGRAM(s) Oral daily  mesalamine Suppository 1000 milliGRAM(s) Rectal at bedtime  metoprolol succinate  milliGRAM(s) Oral daily  midodrine. 10 milliGRAM(s) Oral three times a day  pantoprazole    Tablet 40 milliGRAM(s) Oral before breakfast  polyethylene glycol 3350 17 Gram(s) Oral daily  senna 2 Tablet(s) Oral at bedtime  simvastatin 40 milliGRAM(s) Oral at bedtime    MEDICATIONS  (PRN):  aluminum hydroxide/magnesium hydroxide/simethicone Suspension 30 milliLiter(s) Oral every 6 hours PRN Dyspepsia  artificial  tears Solution 1 Drop(s) Both EYES four times a day PRN Dry Eyes  midodrine. 20 milliGRAM(s) Oral <User Schedule> PRN PRIOR TO HD  sodium chloride 0.9% Bolus. 100 milliLiter(s) IV Bolus every 5 minutes PRN SBP LESS THAN or EQUAL to 90 mmHg

## 2023-08-22 NOTE — DIETITIAN INITIAL EVALUATION ADULT - REASON
Patient w/ voluntary weight reduction along w/ new HD starting 4/2023 having fluid build up removed, will monitor parameters

## 2023-08-22 NOTE — PROGRESS NOTE ADULT - ASSESSMENT
78  year old male    h/o  CAD s/p stent , asa.     A-fib/ PPM, , hypothyroid, and total left knee replacement    prior ppm  interrogation  with  WCT   Ct chest, retrosternal hematoma.  mod left pl effusion/ has   c/c leg  pain/  h/o  non complaince  with meds       admitted  with  abd pain and rectal bleeding onset 1 day ago.. arrive s form n home     has  no pain  now    c/c   systolic  and  diastolic  chf,,  cardiomyopathy,. Mitral  valvuloplasty ,  h/o  c/c  l/edema  of L  leg    h/o    c/c AFIB,   has  PPM         eliquis   on  hold  now      h/o c/c   Anemia, , hb is  12.  gi   known to  dr joann INFANTE,     cath, with 2 vessel disease,  s/p  CABG and  MVR  surg d r marni      echo,  on 7/2022,  ef  35/ new/ severe  TR,  and ,  cath,   was non  obstructive     s/p   MSSA  bacterinia , in march 2023, completed  iv ancef..    s/p   explant pf  ppm  and  Micra  placement on  3/30/23. dr sunitha rocha      Echo,  3/2023,  ef  25,  mod  AI. severe  TR . RV hypokinesis    BEULAH, from  AIN, dx  recently , ancef / was  switched   to iv vanco,   CKD, on HD,  has  right Permcath.house  renal   s/p  renal bx on 4/ 14.. 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 + , Ig A  Lamda. ,  known to heme  dr dobson.  pe r heme,  no  need  for  b marrow  bx    cxr/  CT chest,  pl  effusion,  will need  HD  gi    f/p,  no  bleeding  now/  nuclear  scan, no  bleeding   bacteremia   Clostridium cadeveris,  iv   ertapenem   rpt  bcx   8/13, Staph  epi/  need  discussion., ? contaminant   given  prior    history  of bacteremia   has Micra/  MVR  rpt bc 8/14,  thus  far, is  negative    per  ID.  plan 10  days  of  iv Ertapenem  then  po  flagyl  on iv heparin/  vascular f/p/  awiating  av fistula      vein mapping/  awaiting   avf     completed  iv ertapenem                      card /  dr mikayla dick       < from: Intra-Operative Transesophageal Echo W or WO Ultrasound Enhancing Agent (03.30.23 @ 12:48) >  --------------------------------------------------------------------------------  PRE-BYPASS FINDINGS  Left Ventricle:  Left ventricular ejection fraction is estimated at 25 to 30%. There is severe kypokinesis in the left ventricle.  Right Ventricle:  Moderately reduced systolic function. Moderately reduced systolic function. There is moderate hypokinesis in the right ventricle.  Left Atrium:  Diffuse smoke visualized in LA.  Aortic Valve:  The aortic valve appears trileaflet. There is moderate aortic regurgitation.  No obvious vegetations visualized  Mitral Valve:  Bioprosthetic valve is present in the mitral position. There is calcification of the mitral valve annulus.  No obvious vegetations visualized.  Tricuspid Valve:  There is moderate-severe tricuspid regurgitation. TR unchanged following lead extraction.  Pulmonic Valve:  There is mild pulmonic regurgitation.  Pericardium:  No pericardial effusion seen. No pericardial effusion visualized before and after lead extraction.  Electronically signed by Jimmy Gambino on 3/30/2023 at 3:00:58 PM  *** Final ***  < end of copied text >

## 2023-08-22 NOTE — HISTORY OF PRESENT ILLNESS
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   Mr. Garza had recent admission to hospital in May 2023 for SOB s/t acute on chronic CHF. Hospitalization complicated by decrease in EF to 25% and new BEULAH in the setting of overdiuresis and volume depletion. Pt was started on HD in hospital.   Mr Garza states rectal bleeding is compromising his quality of life.   Neph:  Colorectal Dr Hayes Cardiac Dr Christensen Cardiac Intensivist Dr Chaudhry

## 2023-08-22 NOTE — PRE PROCEDURE NOTE - PRE PROCEDURE EVALUATION
SURGERY PRE-OP NOTE    Preop Diagnosis:  Renal Failure  Planned Procedure: Left AV fisutla creation  Surgeon: Dr. Jules      Pertinent Labs:                            9.8    5.41  )-----------( 113      ( 22 Aug 2023 04:31 )             30.4       08-22    137  |  101  |  23  ----------------------------<  100<H>  3.6   |  27  |  2.00<H>    Ca    9.2      22 Aug 2023 04:31        PTT - ( 22 Aug 2023 04:31 )  PTT:74.4 sec    -----------------------------------------------------------------------------------------------------------------------------------  Type and Screen:  ordered  -----------------------------------------------------------------------------------------------------------------------------------    CXR: < from: Xray Chest 1 View AP/PA (08.11.23 @ 01:32) >  IMPRESSION:    New right pleural effusion, with a subjacent atelectasis. No free air   underneath the diaphragm.    < end of copied text >  -----------------------------------------------------------------------------------------------------------------------------------  EKG:    < from: 12 Lead ECG (08.11.23 @ 01:32) >  Diagnosis Line ATRIAL FIBRILLATION WITH OCCASIONAL ventricular-paced complexes  RIGHT SUPERIOR AXIS DEVIATION  PULMONARY DISEASE PATTERN  NONSPECIFIC T WAVE ABNORMALITY  ABNORMAL ECG  WHEN COMPARED WITH ECG OF 31-MAR-2023 09:50,  NOW WITH V PACED COMPLEXES  Confirmed by MD ELIZONDO BENJAMIN ([id number]) on 8/11/2023 9:04:41 PM    < end of copied text >      -----------------------------------------------------------------------------------------------------------------------------------  Echo:  < from: TTE W or WO Ultrasound Enhancing Agent (08.21.23 @ 07:52) >  1. Left ventricular systolic function is normal with an ejection fraction of 53 % by Contreras's method of disks.   2. Mildly enlarged right ventricular cavity size, normal right ventricular wall thickness and reduced systolic right ventricular function.   3. The left atrium is severely dilated in size.   4. The right atrium is severely dilated in size.   5. Mild-moderate tricuspid regurgitation.   6. Bioprosthetic valve present in the mitral position. Well seated prosthetic mitral valve with normal function. There is No intravalvular regurgitation.   7. Mild-to-moderate aortic regurgitation.   8. No obvious evidence of endocarditis. Consider ERENDIRA if clinically indicated.   9. Compared to the transthoracic echocardiogram performed on 3/29/2023, no significant changes.    < end of copied text >    -----------------------------------------------------------------------------------------------------------------------------------  Assessment:       Plan:  - NPO after midnight   - consent to be obtained  - Preop for left AV fistula creation  with Dr. Jules  - Pending: clearance documenation  - pre op labs 4:00 am of 8/22: cbc, bmp, mg, phos, PTT/INR, type and screenx1  - Please document medicine and cardiac clearance    Vascular Surgery   [id number]             SURGERY PRE-OP NOTE    Preop Diagnosis:  Renal Failure  Planned Procedure: Left AV fisutla creation  Surgeon: Dr. Jules      Pertinent Labs:                            9.8    5.41  )-----------( 113      ( 22 Aug 2023 04:31 )             30.4       08-22    137  |  101  |  23  ----------------------------<  100<H>  3.6   |  27  |  2.00<H>    Ca    9.2      22 Aug 2023 04:31        PTT - ( 22 Aug 2023 04:31 )  PTT:74.4 sec    -----------------------------------------------------------------------------------------------------------------------------------  Type and Screen:  ordered  -----------------------------------------------------------------------------------------------------------------------------------    CXR: < from: Xray Chest 1 View AP/PA (08.11.23 @ 01:32) >  IMPRESSION:    New right pleural effusion, with a subjacent atelectasis. No free air   underneath the diaphragm.    < end of copied text >  -----------------------------------------------------------------------------------------------------------------------------------  EKG:    < from: 12 Lead ECG (08.11.23 @ 01:32) >  Diagnosis Line ATRIAL FIBRILLATION WITH OCCASIONAL ventricular-paced complexes  RIGHT SUPERIOR AXIS DEVIATION  PULMONARY DISEASE PATTERN  NONSPECIFIC T WAVE ABNORMALITY  ABNORMAL ECG  WHEN COMPARED WITH ECG OF 31-MAR-2023 09:50,  NOW WITH V PACED COMPLEXES  Confirmed by MD ELIZONDO BENJAMIN ([id number]) on 8/11/2023 9:04:41 PM    < end of copied text >      -----------------------------------------------------------------------------------------------------------------------------------  Echo:  < from: TTE W or WO Ultrasound Enhancing Agent (08.21.23 @ 07:52) >  1. Left ventricular systolic function is normal with an ejection fraction of 53 % by Contreras's method of disks.   2. Mildly enlarged right ventricular cavity size, normal right ventricular wall thickness and reduced systolic right ventricular function.   3. The left atrium is severely dilated in size.   4. The right atrium is severely dilated in size.   5. Mild-moderate tricuspid regurgitation.   6. Bioprosthetic valve present in the mitral position. Well seated prosthetic mitral valve with normal function. There is No intravalvular regurgitation.   7. Mild-to-moderate aortic regurgitation.   8. No obvious evidence of endocarditis. Consider ERENDIRA if clinically indicated.   9. Compared to the transthoracic echocardiogram performed on 3/29/2023, no significant changes.    < end of copied text >    -----------------------------------------------------------------------------------------------------------------------------------  Assessment:       Plan:  - NPO after midnight   - hold heparin gtt at 6am  - consent to be obtained  - Preop for left AV fistula creation  with Dr. Jules  - Pending: clearance documenation  - pre op labs 4:00 am of 8/22: cbc, bmp, mg, phos, PTT/INR, type and screenx1  - Please document medicine and cardiac clearance    Vascular Surgery   [id number]

## 2023-08-22 NOTE — PROGRESS NOTE ADULT - SUBJECTIVE AND OBJECTIVE BOX
Date of Service: 08-22-23 @ 07:01           CARDIOLOGY     PROGRESS  NOTE   ________________________________________________    CHIEF COMPLAINT:Patient is a 78y old  Male who presents with a chief complaint of abdominal pain/ rectal bleed (21 Aug 2023 13:05)  doing better  	  REVIEW OF SYSTEMS:  CONSTITUTIONAL: No fever, weight loss, or fatigue  EYES: No eye pain, visual disturbances, or discharge  ENT:  No difficulty hearing, tinnitus, vertigo; No sinus or throat pain  NECK: No pain or stiffness  RESPIRATORY: No cough, wheezing, chills or hemoptysis; No Shortness of Breath  CARDIOVASCULAR: No chest pain, palpitations, passing out, dizziness, or leg swelling  GASTROINTESTINAL: No abdominal or epigastric pain. No nausea, vomiting, or hematemesis; No diarrhea or constipation. No melena or hematochezia.  GENITOURINARY: No dysuria, frequency, hematuria, or incontinence  NEUROLOGICAL: No headaches, memory loss, loss of strength, numbness, or tremors  SKIN: No itching, burning, rashes, or lesions   LYMPH Nodes: No enlarged glands  ENDOCRINE: No heat or cold intolerance; No hair loss  MUSCULOSKELETAL: No joint pain or swelling; No muscle, back, or extremity pain  PSYCHIATRIC: No depression, anxiety, mood swings, or difficulty sleeping  HEME/LYMPH: No easy bruising, or bleeding gums  ALLERGY AND IMMUNOLOGIC: No hives or eczema	    [ x] All others negative	  [ ] Unable to obtain    PHYSICAL EXAM:  T(C): 36.3 (08-22-23 @ 04:31), Max: 36.6 (08-22-23 @ 00:51)  HR: 71 (08-22-23 @ 04:31) (51 - 71)  BP: 115/67 (08-22-23 @ 04:31) (101/57 - 122/71)  RR: 18 (08-22-23 @ 04:31) (17 - 18)  SpO2: 95% (08-22-23 @ 04:31) (93% - 99%)  Wt(kg): --  I&O's Summary    21 Aug 2023 07:01  -  22 Aug 2023 07:00  --------------------------------------------------------  IN: 1080 mL / OUT: 2600 mL / NET: -1520 mL        Appearance: Normal	  HEENT:   Normal oral mucosa, PERRL, EOMI	  Lymphatic: No lymphadenopathy  Cardiovascular: Normal S1 S2, No JVD, + murmurs, No edema  Respiratory:rhonchi  Psychiatry: A & O x 3, Mood & affect appropriate  Gastrointestinal:  Soft, Non-tender, + BS	  Skin: No rashes, No ecchymoses, No cyanosis	  Neurologic: Non-focal  Extremities: Normal range of motion, No clubbing, cyanosis no edema  Vascular: Peripheral pulses palpable 2+ bilaterally    MEDICATIONS  (STANDING):  epoetin val (EPOGEN) Injectable 4000 Unit(s) IV Push <User Schedule>  ertapenem  IVPB 500 milliGRAM(s) IV Intermittent every 24 hours  erythromycin   Ointment 1 Application(s) Left EYE three times a day  escitalopram 5 milliGRAM(s) Oral daily  heparin  Infusion 1100 Unit(s)/Hr (11 mL/Hr) IV Continuous <Continuous>  hydrocortisone hemorrhoidal Suppository 1 Suppository(s) Rectal two times a day  levothyroxine 137 MICROGram(s) Oral daily  lisinopril 2.5 milliGRAM(s) Oral daily  mesalamine Suppository 1000 milliGRAM(s) Rectal at bedtime  metoprolol succinate  milliGRAM(s) Oral daily  midodrine. 10 milliGRAM(s) Oral three times a day  pantoprazole    Tablet 40 milliGRAM(s) Oral before breakfast  polyethylene glycol 3350 17 Gram(s) Oral daily  senna 2 Tablet(s) Oral at bedtime  simvastatin 40 milliGRAM(s) Oral at bedtime      TELEMETRY: 	    ECG:  	  RADIOLOGY:  OTHER: 	  	  LABS:	 	    CARDIAC MARKERS:                                9.8    5.41  )-----------( 113      ( 22 Aug 2023 04:31 )             30.4     08-22    137  |  101  |  23  ----------------------------<  100<H>  3.6   |  27  |  2.00<H>    Ca    9.2      22 Aug 2023 04:31      proBNP:   Lipid Profile:   HgA1c:   TSH:   PTT - ( 22 Aug 2023 04:31 )  PTT:74.4 sec    * c/w ertapenem 500 qd, will complete a 10 day course for bacteremia, on discharge switch to PO flaygl 500 q 8 through 8/22  * rectal bleed management as per the primary team  * will sign off, please call with questions    < from: VA Duplex Upper Ext Vein Scan, Bilat (08.21.23 @ 12:15) >  IMPRESSION: The diameters of the visualized, nonthrombosed right and left   cephalic and basilic veins  are entered in the table above.    < from: TTE W or WO Ultrasound Enhancing Agent (08.21.23 @ 07:52) >   1. Left ventricular systolic function is normal with an ejection fraction of 53 % by Contreras's method of disks.   2. Mildly enlarged right ventricular cavity size, normal right ventricular wall thickness and reduced systolic right ventricular function.   3. The left atrium is severely dilated in size.   4. The right atrium is severely dilated in size.   5. Mild-moderate tricuspid regurgitation.   6. Bioprosthetic valve present in the mitral position. Well seated prosthetic mitral valve with normal function. There is No intravalvular regurgitation.   7. Mild-to-moderate aortic regurgitation.   8. No obvious evidence of endocarditis. Consider ERENDIRA if clinically indicated.   9. Compared to the transthoracic echocardiogram performed on 3/29/2023, no significant changes.        Assessment and plan  ---------------------------  Liam Garza is 79 y.o. M with PMHx Significant for HLD, A-fib, BPH, ESRD, CANDY, who presents to the ED with c.o. abd pain, rectal pain, and bloody stools.  Per patient, he has been experiencing abd pain and rectal bleeding onset 1 day ago.  Patient notes pain is localized to the suprapubic area, radiates to the RLQ.  He notes pain is intermittent and has had minimal improvement with Tylenol.  Nothing seems to exacerbate his pain.  His only other associated symptom is chills.  Otherwise he denies fevers, N/V, HA, changes in his vision, sore throat, SOB, CP, diarrhea or constipation. Blood per rectum is accumulated in his depends. Of note, patient notes rectal pain began in May which she describes as sharp and has been constant since onset.  pt with hx of chronic a.fib, chf systolic EF 35 to 40% s/p ppm ,MICRA sec to ppm exrtacction sec to + BC.  sob sec acute on chronic chf systolc, fluid overload  renal eval possible HD today  ?rectal bleed hold AC, GI eval  ASHD / A. FIb continue beta blocker   repeat echo with hx of pericardial effusion  clear liquid diet  ct ?colitis/ + ua start on abx/ ID noted, awaiting culture  + BC ID follow up discussed with ACP  GI/ MED appreciated  pulmonary edema/ chf, repeat echo check MV, continue HD with increase fluid withdrawal as tolerated  will add ace if bp can tolerate  started on iv heparin yesterday, fu hgb, if no bleeding will switch to NOAC will hold for now  advance diet as  tolerated  doing better, oob to chair/ physical therapy  blood cultures are still +, ID appreciated awaiting ECHO concern about bioprosthetic MV, may need ERENDIRA  doing much better clinically, may switch iv heparin to NOAC if no procedure needed, hgb stable  ID / renal appreciated, ?vascular for AV fistulae placement, but bacteriemia needs to be resolved  dc planning after total of 10 days of abx  add lisinopril 2.5 mg daily  if no improvement in Ef pt will need AICD in 3 months as per primary prevention  vascular consulted for  av matthew/ fistulae, appreciated /vein mapping  repeat chestt x ray to check for pulmonary edema and pleural effusion  oob to chair/ physical therapy  dc IV abx  awaiting vascular recommendation, s/p vein mapping, fistulae/graft placement

## 2023-08-22 NOTE — PROVIDER CONTACT NOTE (OTHER) - ASSESSMENT
Pt on heparin gtt. Goal = 50 - 70
Pt A&O x4, denies discomfort or distress at this time. Pt aPTT 73.2 . Goal 50-70

## 2023-08-22 NOTE — DIETITIAN INITIAL EVALUATION ADULT - ENERGY INTAKE
Adequate (%) Patient reports currently eating well during admission, tolerating PO better w/ abdominal pain that he was admitted with now subsiding. Patient raised a few  concerns; all concerns addressed, conferred w/ senior . Food preferences obtained.

## 2023-08-22 NOTE — DIETITIAN INITIAL EVALUATION ADULT - NS FNS DIET ORDER
Diet, NPO after Midnight:      NPO Start Date: 22-Aug-2023,   NPO Start Time: 23:59 (08-22-23 @ 10:34)

## 2023-08-22 NOTE — END OF VISIT
[FreeTextEntry3] : I, Dr Gan, personally performed the evaluation & management (E/M) services for this new patient. The E/M includes conducting initial evaluation, assessing all conditions, and establishing the plan of care. Today, my ACP, Lynette AREVALOBC, was present to observe my evaluation & management services for this patient to be followed, going forward.

## 2023-08-22 NOTE — DIETITIAN INITIAL EVALUATION ADULT - ORAL INTAKE PTA/DIET HISTORY
Patient reports he has been eating smaller portions in recent months helping to facilitate intentional weight loss. Denied chewing/swallowing impairment or nausea/vomiting. Reports since initiating HD 4/2023, patient will occasionally see RD at his dialysis center for nutrition follow through.

## 2023-08-22 NOTE — DIETITIAN INITIAL EVALUATION ADULT - PERTINENT LABORATORY DATA
08-22    137  |  101  |  23  ----------------------------<  100<H>  3.6   |  27  |  2.00<H>    Ca    9.2      22 Aug 2023 04:31

## 2023-08-22 NOTE — DIETITIAN INITIAL EVALUATION ADULT - OTHER INFO
NKFA per patient. Patient reports weighing 249lbs back in 4/2023 at the time of him starting HD. Reports most recently weighing ~179lbs, reflective of a significant 70lbs weight drop in 4 months. Patient attributes this in large part from having significant edema in 4/2023 prior to HD initiation (describing his legs/ankles being much smaller now after fluid is off) and actual weight loss from reducing his food portions, reports feeling better with weight lost and is able to be more mobile, especially with his lower extremities. Current BW of 174lbs noted. Will monitor weight trend.    Synthroid noted.

## 2023-08-22 NOTE — DIETITIAN INITIAL EVALUATION ADULT - NS FNS REASON FOR WEIGHT CHANG
partial voluntary reduction of portion sizes and large part from fluid loss after starting new HD back in 4/2023 per patient/decreased po intake

## 2023-08-23 ENCOUNTER — APPOINTMENT (OUTPATIENT)
Dept: VASCULAR SURGERY | Facility: HOSPITAL | Age: 78
End: 2023-08-23

## 2023-08-23 LAB
ANION GAP SERPL CALC-SCNC: 8 MMOL/L — SIGNIFICANT CHANGE UP (ref 5–17)
APTT BLD: 30.7 SEC — SIGNIFICANT CHANGE UP (ref 24.5–35.6)
APTT BLD: 40.3 SEC — HIGH (ref 24.5–35.6)
BLD GP AB SCN SERPL QL: NEGATIVE — SIGNIFICANT CHANGE UP
BUN SERPL-MCNC: 35 MG/DL — HIGH (ref 7–23)
CALCIUM SERPL-MCNC: 9.6 MG/DL — SIGNIFICANT CHANGE UP (ref 8.4–10.5)
CHLORIDE SERPL-SCNC: 102 MMOL/L — SIGNIFICANT CHANGE UP (ref 96–108)
CO2 SERPL-SCNC: 28 MMOL/L — SIGNIFICANT CHANGE UP (ref 22–31)
CREAT SERPL-MCNC: 2.21 MG/DL — HIGH (ref 0.5–1.3)
EGFR: 30 ML/MIN/1.73M2 — LOW
GLUCOSE SERPL-MCNC: 84 MG/DL — SIGNIFICANT CHANGE UP (ref 70–99)
HCT VFR BLD CALC: 31.5 % — LOW (ref 39–50)
HGB BLD-MCNC: 10 G/DL — LOW (ref 13–17)
INR BLD: 1.12 RATIO — SIGNIFICANT CHANGE UP (ref 0.85–1.18)
MAGNESIUM SERPL-MCNC: 1.6 MG/DL — SIGNIFICANT CHANGE UP (ref 1.6–2.6)
MCHC RBC-ENTMCNC: 28.8 PG — SIGNIFICANT CHANGE UP (ref 27–34)
MCHC RBC-ENTMCNC: 31.7 GM/DL — LOW (ref 32–36)
MCV RBC AUTO: 90.8 FL — SIGNIFICANT CHANGE UP (ref 80–100)
NRBC # BLD: 0 /100 WBCS — SIGNIFICANT CHANGE UP (ref 0–0)
PHOSPHATE SERPL-MCNC: 3.5 MG/DL — SIGNIFICANT CHANGE UP (ref 2.5–4.5)
PLATELET # BLD AUTO: 128 K/UL — LOW (ref 150–400)
POTASSIUM SERPL-MCNC: 3.7 MMOL/L — SIGNIFICANT CHANGE UP (ref 3.5–5.3)
POTASSIUM SERPL-SCNC: 3.7 MMOL/L — SIGNIFICANT CHANGE UP (ref 3.5–5.3)
PROTHROM AB SERPL-ACNC: 11.7 SEC — SIGNIFICANT CHANGE UP (ref 9.5–13)
RBC # BLD: 3.47 M/UL — LOW (ref 4.2–5.8)
RBC # FLD: 16 % — HIGH (ref 10.3–14.5)
RH IG SCN BLD-IMP: NEGATIVE — SIGNIFICANT CHANGE UP
SODIUM SERPL-SCNC: 138 MMOL/L — SIGNIFICANT CHANGE UP (ref 135–145)
WBC # BLD: 5.39 K/UL — SIGNIFICANT CHANGE UP (ref 3.8–10.5)
WBC # FLD AUTO: 5.39 K/UL — SIGNIFICANT CHANGE UP (ref 3.8–10.5)

## 2023-08-23 PROCEDURE — 99232 SBSQ HOSP IP/OBS MODERATE 35: CPT | Mod: GC

## 2023-08-23 RX ADMIN — MIDODRINE HYDROCHLORIDE 10 MILLIGRAM(S): 2.5 TABLET ORAL at 12:22

## 2023-08-23 RX ADMIN — Medication 137 MICROGRAM(S): at 05:43

## 2023-08-23 RX ADMIN — SIMVASTATIN 40 MILLIGRAM(S): 20 TABLET, FILM COATED ORAL at 21:33

## 2023-08-23 RX ADMIN — MIDODRINE HYDROCHLORIDE 10 MILLIGRAM(S): 2.5 TABLET ORAL at 18:38

## 2023-08-23 RX ADMIN — LISINOPRIL 2.5 MILLIGRAM(S): 2.5 TABLET ORAL at 05:44

## 2023-08-23 RX ADMIN — Medication 100 MILLIGRAM(S): at 05:44

## 2023-08-23 RX ADMIN — MIDODRINE HYDROCHLORIDE 10 MILLIGRAM(S): 2.5 TABLET ORAL at 05:43

## 2023-08-23 RX ADMIN — HEPARIN SODIUM 10.5 UNIT(S)/HR: 5000 INJECTION INTRAVENOUS; SUBCUTANEOUS at 11:11

## 2023-08-23 RX ADMIN — HEPARIN SODIUM 11.5 UNIT(S)/HR: 5000 INJECTION INTRAVENOUS; SUBCUTANEOUS at 21:34

## 2023-08-23 RX ADMIN — ERYTHROPOIETIN 4000 UNIT(S): 10000 INJECTION, SOLUTION INTRAVENOUS; SUBCUTANEOUS at 19:05

## 2023-08-23 RX ADMIN — HEPARIN SODIUM 11.5 UNIT(S)/HR: 5000 INJECTION INTRAVENOUS; SUBCUTANEOUS at 19:33

## 2023-08-23 RX ADMIN — PANTOPRAZOLE SODIUM 40 MILLIGRAM(S): 20 TABLET, DELAYED RELEASE ORAL at 05:45

## 2023-08-23 RX ADMIN — ESCITALOPRAM OXALATE 5 MILLIGRAM(S): 10 TABLET, FILM COATED ORAL at 12:22

## 2023-08-23 NOTE — PROGRESS NOTE ADULT - ASSESSMENT
78  year old male    h/o  CAD s/p stent , asa.     A-fib/ PPM, , hypothyroid, and total left knee replacement    prior ppm  interrogation  with  WCT   Ct chest, retrosternal hematoma.  mod left pl effusion/ has   c/c leg  pain/  h/o  non complaince  with meds       admitted  with  abd pain and rectal bleeding onset 1 day ago.. arrive s form n home     has  no pain  now    c/c   systolic  and  diastolic  chf,,  cardiomyopathy,. Mitral  valvuloplasty ,  h/o  c/c  l/edema  of L  leg    h/o    c/c AFIB,   has  PPM         eliquis   on  hold  now      h/o c/c   Anemia, , hb is  12.  gi   known to  dr joann INFANTE,     cath, with 2 vessel disease,  s/p  CABG and  MVR  surg d r marni      echo,  on 7/2022,  ef  35/ new/ severe  TR,  and ,  cath,   was non  obstructive     s/p   MSSA  bacterinia , in march 2023, completed  iv ancef..    s/p   explant pf  ppm  and  Micra  placement on  3/30/23. dr sunitha rocha      Echo,  3/2023,  ef  25,  mod  AI. severe  TR . RV hypokinesis    BEULAH, from  AIN, dx  recently , ancef / was  switched   to iv vanco,   CKD, on HD,  has  right Permcath.house  renal   s/p  renal bx on 4/ 14.. 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 + , Ig A  Lamda. ,  known to heme  dr dobson.  pe r heme,  no  need  for  b marrow  bx    cxr/  CT chest,  pl  effusion,  will need  HD  gi    f/p,  no  bleeding /and   nuclear  scan, no  bleeding   bacteremia   Clostridium cadeveris,  iv   ertapenem   rpt  bcx   8/13, Staph  epi/  need  discussion., ? contaminant   given  prior    history  of bacteremia   has Micra/  MVR    per  ID.  plan 10  days  of  iv Ertapenem . completed  on iv heparin/  vascular f/p/  awiating  av fistula      vein mapping/  awaiting   avf    in am  cordelia sánchez /  dr mikayla dick       < from: Intra-Operative Transesophageal Echo W or WO Ultrasound Enhancing Agent (03.30.23 @ 12:48) >  --------------------------------------------------------------------------------  PRE-BYPASS FINDINGS  Left Ventricle:  Left ventricular ejection fraction is estimated at 25 to 30%. There is severe kypokinesis in the left ventricle.  Right Ventricle:  Moderately reduced systolic function. Moderately reduced systolic function. There is moderate hypokinesis in the right ventricle.  Left Atrium:  Diffuse smoke visualized in LA.  Aortic Valve:  The aortic valve appears trileaflet. There is moderate aortic regurgitation.  No obvious vegetations visualized  Mitral Valve:  Bioprosthetic valve is present in the mitral position. There is calcification of the mitral valve annulus.  No obvious vegetations visualized.  Tricuspid Valve:  There is moderate-severe tricuspid regurgitation. TR unchanged following lead extraction.  Pulmonic Valve:  There is mild pulmonic regurgitation.  Pericardium:  No pericardial effusion seen. No pericardial effusion visualized before and after lead extraction.  Electronically signed by Jimmy Gambino on 3/30/2023 at 3:00:58 PM  *** Final ***  < end of copied text >

## 2023-08-23 NOTE — PROGRESS NOTE ADULT - PROBLEM SELECTOR PLAN 2
Pt has responded well to previous UF sessions in between regular HD days  Now will continue with HD per schedule (MWF).

## 2023-08-23 NOTE — PROGRESS NOTE ADULT - SUBJECTIVE AND OBJECTIVE BOX
Surgery Daily Progress Note  =====================================================  SUBJECTIVE: A 78y Male Patient seen and examined at bedside on AM rounds. Patient is feeling okay.     ALLERGIES:  Myrbetriq (Hives)  tamsulosin (Hives)  alfuzosin (Hives)  levofloxacin (Hives)      --------------------------------------------------------------------------------------    MEDICATIONS:    Neurologic Medications  escitalopram 5 milliGRAM(s) Oral daily    Respiratory Medications    Cardiovascular Medications  lisinopril 2.5 milliGRAM(s) Oral daily  metoprolol succinate  milliGRAM(s) Oral daily  midodrine. 10 milliGRAM(s) Oral three times a day  midodrine. 20 milliGRAM(s) Oral <User Schedule> PRN PRIOR TO HD    Gastrointestinal Medications  aluminum hydroxide/magnesium hydroxide/simethicone Suspension 30 milliLiter(s) Oral every 6 hours PRN Dyspepsia  mesalamine Suppository 1000 milliGRAM(s) Rectal at bedtime  pantoprazole    Tablet 40 milliGRAM(s) Oral before breakfast  polyethylene glycol 3350 17 Gram(s) Oral daily  senna 2 Tablet(s) Oral at bedtime  sodium chloride 0.9% Bolus. 100 milliLiter(s) IV Bolus every 5 minutes PRN SBP LESS THAN or EQUAL to 90 mmHg    Genitourinary Medications    Hematologic/Oncologic Medications  epoetin val (EPOGEN) Injectable 4000 Unit(s) IV Push <User Schedule>  heparin  Infusion 1100 Unit(s)/Hr IV Continuous <Continuous>    Antimicrobial/Immunologic Medications    Endocrine/Metabolic Medications  levothyroxine 137 MICROGram(s) Oral daily  simvastatin 40 milliGRAM(s) Oral at bedtime    Topical/Other Medications  artificial  tears Solution 1 Drop(s) Both EYES four times a day PRN Dry Eyes  erythromycin   Ointment 1 Application(s) Left EYE three times a day  hydrocortisone hemorrhoidal Suppository 1 Suppository(s) Rectal two times a day    --------------------------------------------------------------------------------------    VITAL SIGNS:  Myrbetriq (Hives)  tamsulosin (Hives)  alfuzosin (Hives)  levofloxacin (Hives)      T(C): 36.5 (08-23-23 @ 04:21), Max: 36.8 (08-22-23 @ 19:37)  HR: 58 (08-23-23 @ 04:21) (55 - 69)  BP: 114/63 (08-23-23 @ 04:21) (93/52 - 129/61)  RR: 18 (08-23-23 @ 04:21) (18 - 18)  SpO2: 95% (08-23-23 @ 04:21) (91% - 97%)  --------------------------------------------------------------------------------------      PHYSICAL EXAM:   General: NAD, Lying in bed comfortably  Neuro: A+Ox3  HEENT: NC/AT, EOMI  Neck: Soft, supple  Cardio: RRR, nml S1/S2  Resp: Good effort, CTA b/l  GI/Abd: Soft, NT/ND, no rebound/guarding, no masses palpated  Vascular: Bilateral arms are warm.   --------------------------------------------------------------------------------------    I&Os  T(C): 36.5 (08-23-23 @ 04:21), Max: 36.8 (08-22-23 @ 19:37)  HR: 58 (08-23-23 @ 04:21) (55 - 69)  BP: 114/63 (08-23-23 @ 04:21) (93/52 - 129/61)  RR: 18 (08-23-23 @ 04:21) (18 - 18)  SpO2: 95% (08-23-23 @ 04:21) (91% - 97%)  --------------------------------------------------------------------------------------    LABS                          10.0   5.39  )-----------( 128      ( 23 Aug 2023 04:31 )             31.5     08-23    138  |  102  |  35<H>  ----------------------------<  84  3.7   |  28  |  2.21<H>    Ca    9.6      23 Aug 2023 04:31  Phos  3.5     08-23  Mg     1.6     08-23      PT/INR - ( 23 Aug 2023 04:31 )   PT: 11.7 sec;   INR: 1.12 ratio         PTT - ( 23 Aug 2023 04:31 )  PTT:30.7 sec  Urinalysis Basic - ( 23 Aug 2023 04:31 )    Color: x / Appearance: x / SG: x / pH: x  Gluc: 84 mg/dL / Ketone: x  / Bili: x / Urobili: x   Blood: x / Protein: x / Nitrite: x   Leuk Esterase: x / RBC: x / WBC x   Sq Epi: x / Non Sq Epi: x / Bacteria: x        08-22-23 @ 07:01  -  08-23-23 @ 07:00  --------------------------------------------------------  IN: 652.5 mL / OUT: 675 mL / NET: -22.5 mL      aluminum hydroxide/magnesium hydroxide/simethicone Suspension 30 milliLiter(s) Oral every 6 hours PRN  artificial  tears Solution 1 Drop(s) Both EYES four times a day PRN  epoetin val (EPOGEN) Injectable 4000 Unit(s) IV Push <User Schedule>  erythromycin   Ointment 1 Application(s) Left EYE three times a day  escitalopram 5 milliGRAM(s) Oral daily  heparin  Infusion 1100 Unit(s)/Hr IV Continuous <Continuous>  hydrocortisone hemorrhoidal Suppository 1 Suppository(s) Rectal two times a day  levothyroxine 137 MICROGram(s) Oral daily  lisinopril 2.5 milliGRAM(s) Oral daily  mesalamine Suppository 1000 milliGRAM(s) Rectal at bedtime  metoprolol succinate  milliGRAM(s) Oral daily  midodrine. 10 milliGRAM(s) Oral three times a day  midodrine. 20 milliGRAM(s) Oral <User Schedule> PRN  pantoprazole    Tablet 40 milliGRAM(s) Oral before breakfast  polyethylene glycol 3350 17 Gram(s) Oral daily  senna 2 Tablet(s) Oral at bedtime  simvastatin 40 milliGRAM(s) Oral at bedtime  sodium chloride 0.9% Bolus. 100 milliLiter(s) IV Bolus every 5 minutes PRN      RADIOLOGY, EKG & ADDITIONAL TESTS: Reviewed.

## 2023-08-23 NOTE — PROGRESS NOTE ADULT - SUBJECTIVE AND OBJECTIVE BOX
Date of Service: 08-23-23 @ 07:21           CARDIOLOGY     PROGRESS  NOTE   ________________________________________________    CHIEF COMPLAINT:Patient is a 78y old  Male who presents with a chief complaint of Gastrointestinal hemorrhage    Per chart, patient is a 78 y/o male with PMH including AFib, BPH, HLD, ESRD on HD, CANDY. Patient presents to [facility] w/ abdominal pain, rectal pain, bloody BMs. (22 Aug 2023 16:50)  doing well, no complain  	  REVIEW OF SYSTEMS:  CONSTITUTIONAL: No fever, weight loss, or fatigue  EYES: No eye pain, visual disturbances, or discharge  ENT:  No difficulty hearing, tinnitus, vertigo; No sinus or throat pain  NECK: No pain or stiffness  RESPIRATORY: No cough, wheezing, chills or hemoptysis; No Shortness of Breath  CARDIOVASCULAR: No chest pain, palpitations, passing out, dizziness, or leg swelling  GASTROINTESTINAL: No abdominal or epigastric pain. No nausea, vomiting, or hematemesis; No diarrhea or constipation. No melena or hematochezia.  GENITOURINARY: No dysuria, frequency, hematuria, or incontinence  NEUROLOGICAL: No headaches, memory loss, loss of strength, numbness, or tremors  SKIN: No itching, burning, rashes, or lesions   LYMPH Nodes: No enlarged glands  ENDOCRINE: No heat or cold intolerance; No hair loss  MUSCULOSKELETAL: No joint pain or swelling; No muscle, back, or extremity pain  PSYCHIATRIC: No depression, anxiety, mood swings, or difficulty sleeping  HEME/LYMPH: No easy bruising, or bleeding gums  ALLERGY AND IMMUNOLOGIC: No hives or eczema	    [x ] All others negative	  [ ] Unable to obtain    PHYSICAL EXAM:  T(C): 36.5 (08-23-23 @ 04:21), Max: 36.8 (08-22-23 @ 19:37)  HR: 58 (08-23-23 @ 04:21) (55 - 69)  BP: 114/63 (08-23-23 @ 04:21) (93/52 - 129/61)  RR: 18 (08-23-23 @ 04:21) (18 - 18)  SpO2: 95% (08-23-23 @ 04:21) (91% - 97%)  Wt(kg): --  I&O's Summary    22 Aug 2023 07:01  -  23 Aug 2023 07:00  --------------------------------------------------------  IN: 652.5 mL / OUT: 675 mL / NET: -22.5 mL        Appearance: Normal	  HEENT:   Normal oral mucosa, PERRL, EOMI	  Lymphatic: No lymphadenopathy  Cardiovascular: Normal S1 S2, No JVD, + murmurs, No edema  Respiratory:rhonchi  Psychiatry: A & O x 3, Mood & affect appropriate  Gastrointestinal:  Soft, Non-tender, + BS	  Skin: No rashes, No ecchymoses, No cyanosis	  Neurologic: Non-focal  Extremities: Normal range of motion, No clubbing, cyanosis or edema  Vascular: Peripheral pulses palpable 2+ bilaterally    MEDICATIONS  (STANDING):  epoetin val (EPOGEN) Injectable 4000 Unit(s) IV Push <User Schedule>  erythromycin   Ointment 1 Application(s) Left EYE three times a day  escitalopram 5 milliGRAM(s) Oral daily  heparin  Infusion 1100 Unit(s)/Hr (10.5 mL/Hr) IV Continuous <Continuous>  hydrocortisone hemorrhoidal Suppository 1 Suppository(s) Rectal two times a day  levothyroxine 137 MICROGram(s) Oral daily  lisinopril 2.5 milliGRAM(s) Oral daily  mesalamine Suppository 1000 milliGRAM(s) Rectal at bedtime  metoprolol succinate  milliGRAM(s) Oral daily  midodrine. 10 milliGRAM(s) Oral three times a day  pantoprazole    Tablet 40 milliGRAM(s) Oral before breakfast  polyethylene glycol 3350 17 Gram(s) Oral daily  senna 2 Tablet(s) Oral at bedtime  simvastatin 40 milliGRAM(s) Oral at bedtime      TELEMETRY: 	    ECG:  	  RADIOLOGY:  OTHER: 	  	  LABS:	 	    CARDIAC MARKERS                          10.0   5.39  )-----------( 128      ( 23 Aug 2023 04:31 )             31.5     08-23    138  |  102  |  35<H>  ----------------------------<  84  3.7   |  28  |  2.21<H>    Ca    9.6      23 Aug 2023 04:31  Phos  3.5     08-23  Mg     1.6     08-23      proBNP:   Lipid Profile:   HgA1c:   TSH:   PT/INR - ( 23 Aug 2023 04:31 )   PT: 11.7 sec;   INR: 1.12 ratio         PTT - ( 23 Aug 2023 04:31 )  PTT:30.7 sec  - NPO after midnight   - hold heparin gtt at 6am  - consent to be obtained  - Preop for left AV fistula creation  with Dr. Jules  - Pending: clearance documenation     1. Left ventricular systolic function is normal with an ejection fraction of 53 % by Contreras's method of disks.   2. Mildly enlarged right ventricular cavity size, normal right ventricular wall thickness and reduced systolic right ventricular function.   3. The left atrium is severely dilated in size.   4. The right atrium is severely dilated in size.   5. Mild-moderate tricuspid regurgitation.   6. Bioprosthetic valve present in the mitral position. Well seated prosthetic mitral valve with normal function. There is No intravalvular regurgitation.   7. Mild-to-moderate aortic regurgitation.   8. No obvious evidence of endocarditis. Consider ERENDIRA if clinically indicated.   9. Compared to the transthoracic echocardiogram performed on 3/29/2023, no significant changes.    Assessment and plan  ---------------------------  Liam Garza is 79 y.o. M with PMHx Significant for HLD, A-fib, BPH, ESRD, CANDY, who presents to the ED with c.o. abd pain, rectal pain, and bloody stools.  Per patient, he has been experiencing abd pain and rectal bleeding onset 1 day ago.  Patient notes pain is localized to the suprapubic area, radiates to the RLQ.  He notes pain is intermittent and has had minimal improvement with Tylenol.  Nothing seems to exacerbate his pain.  His only other associated symptom is chills.  Otherwise he denies fevers, N/V, HA, changes in his vision, sore throat, SOB, CP, diarrhea or constipation. Blood per rectum is accumulated in his depends. Of note, patient notes rectal pain began in May which she describes as sharp and has been constant since onset.  pt with hx of chronic a.fib, chf systolic EF 35 to 40% s/p ppm ,MICRA sec to ppm exrtacction sec to + BC.  sob sec acute on chronic chf systolc, fluid overload  renal eval possible HD today  ?rectal bleed hold AC, GI eval  ASHD / A. FIb continue beta blocker   repeat echo with hx of pericardial effusion  clear liquid diet  ct ?colitis/ + ua start on abx/ ID noted, awaiting culture  + BC ID follow up discussed with ACP  GI/ MED appreciated  pulmonary edema/ chf, repeat echo check MV, continue HD with increase fluid withdrawal as tolerated  will add ace if bp can tolerate  started on iv heparin yesterday, fu hgb, if no bleeding will switch to NOAC will hold for now  advance diet as  tolerated  doing better, oob to chair/ physical therapy  blood cultures are still +, ID appreciated awaiting ECHO concern about bioprosthetic MV, may need ERENDIRA  doing much better clinically, may switch iv heparin to NOAC if no procedure needed, hgb stable  ID / renal appreciated, ?vascular for AV fistulae placement, but bacteriemia needs to be resolved  dc planning after total of 10 days of abx  add lisinopril 2.5 mg daily  if no improvement in Ef pt will need AICD in 3 months as per primary prevention  vascular consulted for  av matthew/ fistulae, appreciated /vein mapping  pt is clear cardiac wise for the vascular procedure today

## 2023-08-23 NOTE — PROGRESS NOTE ADULT - ASSESSMENT
A 79 y.o. M with PMHx Significant for HLD, A-fib, BPH, ESRD, CANDY, who presents to the ED with c.o. abd pain, rectal pain, and bloody stools.  Patient is on dialysis MWF. Vascular surgery is consulted for AVF creation. Patient endorses having a pacemaker.    Plan:  - NPO after midnight   - hold heparin gtt at 6am  - consent to be obtained  - Preop for left AV fistula creation  with Dr. Jules  - Cancel HD for today    Vascular Surgery   [id number]     A 79 y.o. M with PMHx Significant for HLD, A-fib, BPH, ESRD, CANDY, who presents to the ED with c.o. abd pain, rectal pain, and bloody stools.  Patient is on dialysis MWF. Vascular surgery is consulted for AVF creation. Patient endorses having a pacemaker.    Plan:  - NPO after midnight   - hold heparin gtt at 6am  - consent to be obtained  - Preop for left AV fistula creation  with Dr. Jules  - Cancel HD for today    Revision (8/23, 10:00):  - Per Dr. Jules, unable to accommodate for OR today. Please resume HD for today. NPO discontinued.    Vascular Surgery   [id number]

## 2023-08-23 NOTE — PROGRESS NOTE ADULT - SUBJECTIVE AND OBJECTIVE BOX
date of service: 08-23-23 @ 09:30  afberile  REVIEW OF SYSTEMS:  CONSTITUTIONAL: No fever,  no  weight loss  ENT:  No  tinnitus,   no   vertigo  NECK: No pain or stiffness  RESPIRATORY: No cough, wheezing, chills or hemoptysis;    No Shortness of Breath  CARDIOVASCULAR: No chest pain, palpitations, dizziness  GASTROINTESTINAL: No abdominal or epigastric pain. No nausea, vomiting, or hematemesis; No diarrhea  No melena or hematochezia.  GENITOURINARY: No dysuria, frequency, hematuria, or incontinence  NEUROLOGICAL: No headaches  SKIN: No itching,  no   rash  LYMPH Nodes: No enlarged glands  ENDOCRINE: No heat or cold intolerance  MUSCULOSKELETAL: No joint pain or swelling  PSYCHIATRIC: No depression, anxiety  HEME/LYMPH: No easy bruising, or bleeding gums  ALLERGY AND IMMUNOLOGIC: No hives or eczema	    MEDICATIONS  (STANDING):  epoetin val (EPOGEN) Injectable 4000 Unit(s) IV Push <User Schedule>  erythromycin   Ointment 1 Application(s) Left EYE three times a day  escitalopram 5 milliGRAM(s) Oral daily  heparin  Infusion 1100 Unit(s)/Hr (10.5 mL/Hr) IV Continuous <Continuous>  hydrocortisone hemorrhoidal Suppository 1 Suppository(s) Rectal two times a day  levothyroxine 137 MICROGram(s) Oral daily  lisinopril 2.5 milliGRAM(s) Oral daily  mesalamine Suppository 1000 milliGRAM(s) Rectal at bedtime  metoprolol succinate  milliGRAM(s) Oral daily  midodrine. 10 milliGRAM(s) Oral three times a day  pantoprazole    Tablet 40 milliGRAM(s) Oral before breakfast  polyethylene glycol 3350 17 Gram(s) Oral daily  senna 2 Tablet(s) Oral at bedtime  simvastatin 40 milliGRAM(s) Oral at bedtime    MEDICATIONS  (PRN):  aluminum hydroxide/magnesium hydroxide/simethicone Suspension 30 milliLiter(s) Oral every 6 hours PRN Dyspepsia  artificial  tears Solution 1 Drop(s) Both EYES four times a day PRN Dry Eyes  midodrine. 20 milliGRAM(s) Oral <User Schedule> PRN PRIOR TO HD  sodium chloride 0.9% Bolus. 100 milliLiter(s) IV Bolus every 5 minutes PRN SBP LESS THAN or EQUAL to 90 mmHg      Vital Signs Last 24 Hrs  T(C): 36.5 (23 Aug 2023 04:21), Max: 36.8 (22 Aug 2023 19:37)  T(F): 97.7 (23 Aug 2023 04:21), Max: 98.3 (22 Aug 2023 19:37)  HR: 58 (23 Aug 2023 04:21) (55 - 69)  BP: 114/63 (23 Aug 2023 04:21) (93/52 - 129/61)  BP(mean): --  RR: 18 (23 Aug 2023 04:21) (18 - 18)  SpO2: 95% (23 Aug 2023 04:21) (91% - 97%)    Parameters below as of 23 Aug 2023 04:21  Patient On (Oxygen Delivery Method): nasal cannula  O2 Flow (L/min): 2    CAPILLARY BLOOD GLUCOSE        I&O's Summary    22 Aug 2023 07:01  -  23 Aug 2023 07:00  --------------------------------------------------------  IN: 652.5 mL / OUT: 675 mL / NET: -22.5 mL          Appearance: Normal	  HEENT:   Normal oral mucosa, PERRL, EOMI	  Lymphatic: No lymphadenopathy  Cardiovascular: Normal S1 S2, No JVD  Respiratory: Lungs clear to auscultation	  Gastrointestinal:  Soft, Non-tender, + BS	  Skin: No rash, No ecchymoses	  Extremities:     LABS:                        10.0   5.39  )-----------( 128      ( 23 Aug 2023 04:31 )             31.5     08-23    138  |  102  |  35<H>  ----------------------------<  84  3.7   |  28  |  2.21<H>    Ca    9.6      23 Aug 2023 04:31  Phos  3.5     08-23  Mg     1.6     08-23      PT/INR - ( 23 Aug 2023 04:31 )   PT: 11.7 sec;   INR: 1.12 ratio         PTT - ( 23 Aug 2023 04:31 )  PTT:30.7 sec      Urinalysis Basic - ( 23 Aug 2023 04:31 )    Color: x / Appearance: x / SG: x / pH: x  Gluc: 84 mg/dL / Ketone: x  / Bili: x / Urobili: x   Blood: x / Protein: x / Nitrite: x   Leuk Esterase: x / RBC: x / WBC x   Sq Epi: x / Non Sq Epi: x / Bacteria: x                      Consultant(s) Notes Reviewed:      Care Discussed with Consultants/Other Providers:

## 2023-08-23 NOTE — PROGRESS NOTE ADULT - SUBJECTIVE AND OBJECTIVE BOX
[facility] DIVISION OF KIDNEY DISEASES AND HYPERTENSION   FOLLOW UP NOTE    --------------------------------------------------------------------------------  Chief Complaint:    24 hour events/subjective: Pt. was seen and examined today. Overnight events  noted. Remains on 2L NC, denies any shortness of breath.       PAST HISTORY  --------------------------------------------------------------------------------  No significant changes to PMH, PSH, FHx, SHx, unless otherwise noted    ALLERGIES & MEDICATIONS  --------------------------------------------------------------------------------  Allergies    Myrbetriq (Hives)  tamsulosin (Hives)  alfuzosin (Hives)  levofloxacin (Hives)    Intolerances      Standing Inpatient Medications  epoetin val (EPOGEN) Injectable 4000 Unit(s) IV Push <User Schedule>  erythromycin   Ointment 1 Application(s) Left EYE three times a day  escitalopram 5 milliGRAM(s) Oral daily  heparin  Infusion 1100 Unit(s)/Hr IV Continuous <Continuous>  hydrocortisone hemorrhoidal Suppository 1 Suppository(s) Rectal two times a day  levothyroxine 137 MICROGram(s) Oral daily  lisinopril 2.5 milliGRAM(s) Oral daily  mesalamine Suppository 1000 milliGRAM(s) Rectal at bedtime  metoprolol succinate  milliGRAM(s) Oral daily  midodrine. 10 milliGRAM(s) Oral three times a day  pantoprazole    Tablet 40 milliGRAM(s) Oral before breakfast  polyethylene glycol 3350 17 Gram(s) Oral daily  senna 2 Tablet(s) Oral at bedtime  simvastatin 40 milliGRAM(s) Oral at bedtime    PRN Inpatient Medications  aluminum hydroxide/magnesium hydroxide/simethicone Suspension 30 milliLiter(s) Oral every 6 hours PRN  artificial  tears Solution 1 Drop(s) Both EYES four times a day PRN  midodrine. 20 milliGRAM(s) Oral <User Schedule> PRN  sodium chloride 0.9% Bolus. 100 milliLiter(s) IV Bolus every 5 minutes PRN      REVIEW OF SYSTEMS  --------------------------------------------------------------------------------  Gen: No fevers/chills  Head/Eyes/Ears: No HA   Respiratory: No dyspnea, cough  CV: No chest pain  GI: No abdominal pain, diarrhea  : No dysuria, hematuria  MSK: No  edema  Skin: No rashes  Heme: No easy bruising or bleeding    All other systems were reviewed and are negative, except as noted.    VITALS/PHYSICAL EXAM  --------------------------------------------------------------------------------  T(C): 36.5 (08-23-23 @ 04:21), Max: 36.8 (08-22-23 @ 19:37)  HR: 58 (08-23-23 @ 04:21) (57 - 64)  BP: 114/63 (08-23-23 @ 04:21) (114/63 - 122/60)  RR: 18 (08-23-23 @ 04:21) (18 - 18)  SpO2: 95% (08-23-23 @ 04:21) (95% - 97%)  Wt(kg): --        08-22-23 @ 07:01  -  08-23-23 @ 07:00  --------------------------------------------------------  IN: 652.5 mL / OUT: 675 mL / NET: -22.5 mL        Physical Exam:  	Gen: NAD  	HEENT: Anicteric  	Pulm: CTA/ BL  	CV: S1S2+  	Abd: Soft, +BS   	Ext: No LE edema B/L  	Neuro: Awake  	Skin: Warm and dry  	Dialysis access: right tunneled cath       LABS/STUDIES  --------------------------------------------------------------------------------              10.0   5.39  >-----------<  128      [08-23-23 @ 04:31]              31.5     138  |  102  |  35  ----------------------------<  84      [08-23-23 @ 04:31]  3.7   |  28  |  2.21        Ca     9.6     [08-23-23 @ 04:31]      Mg     1.6     [08-23-23 @ 04:31]      Phos  3.5     [08-23-23 @ 04:31]      PT/INR: PT 11.7 , INR 1.12       [08-23-23 @ 04:31]  PTT: 30.7       [08-23-23 @ 04:31]      Creatinine Trend:  SCr 2.21 [08-23 @ 04:31]  SCr 2.00 [08-22 @ 04:31]  SCr 2.87 [08-21 @ 04:11]  SCr 2.37 [08-20 @ 06:49]  SCr 2.28 [08-19 @ 06:02]    Urinalysis - [08-23-23 @ 04:31]      Color  / Appearance  / SG  / pH       Gluc 84 / Ketone   / Bili  / Urobili        Blood  / Protein  / Leuk Est  / Nitrite       RBC  / WBC  / Hyaline  / Gran  / Sq Epi  / Non Sq Epi  / Bacteria       HBsAb <3.0      [08-11-23 @ 21:13]  HBsAg Nonreact      [08-11-23 @ 21:13]  HBcAb Nonreact      [08-11-23 @ 21:13]      Tacrolimus  Cyclosporine  Sirolimus  Mycophenolate  BK PCR  CMV PCR  Parvo PCR  EBV PCR [facility] DIVISION OF KIDNEY DISEASES AND HYPERTENSION   FOLLOW UP NOTE    --------------------------------------------------------------------------------  Chief Complaint:    24 hour events/subjective: Pt. was seen and examined today. Overnight events  noted. Remains on 2L NC, denies any shortness of breath.       PAST HISTORY  --------------------------------------------------------------------------------  No significant changes to PMH, PSH, FHx, SHx, unless otherwise noted    ALLERGIES & MEDICATIONS  --------------------------------------------------------------------------------  Allergies    Myrbetriq (Hives)  tamsulosin (Hives)  alfuzosin (Hives)  levofloxacin (Hives)    Intolerances      Standing Inpatient Medications  epoetin val (EPOGEN) Injectable 4000 Unit(s) IV Push <User Schedule>  erythromycin   Ointment 1 Application(s) Left EYE three times a day  escitalopram 5 milliGRAM(s) Oral daily  heparin  Infusion 1100 Unit(s)/Hr IV Continuous <Continuous>  hydrocortisone hemorrhoidal Suppository 1 Suppository(s) Rectal two times a day  levothyroxine 137 MICROGram(s) Oral daily  lisinopril 2.5 milliGRAM(s) Oral daily  mesalamine Suppository 1000 milliGRAM(s) Rectal at bedtime  metoprolol succinate  milliGRAM(s) Oral daily  midodrine. 10 milliGRAM(s) Oral three times a day  pantoprazole    Tablet 40 milliGRAM(s) Oral before breakfast  polyethylene glycol 3350 17 Gram(s) Oral daily  senna 2 Tablet(s) Oral at bedtime  simvastatin 40 milliGRAM(s) Oral at bedtime    PRN Inpatient Medications  aluminum hydroxide/magnesium hydroxide/simethicone Suspension 30 milliLiter(s) Oral every 6 hours PRN  artificial  tears Solution 1 Drop(s) Both EYES four times a day PRN  midodrine. 20 milliGRAM(s) Oral <User Schedule> PRN  sodium chloride 0.9% Bolus. 100 milliLiter(s) IV Bolus every 5 minutes PRN      REVIEW OF SYSTEMS  --------------------------------------------------------------------------------  Gen: No fevers/chills  Head/Eyes/Ears: No HA   Respiratory: No dyspnea, cough  CV: No chest pain  GI: No abdominal pain, diarrhea  : No dysuria, hematuria  MSK: No  edema  Skin: No rashes  Heme: No easy bruising or bleeding    All other systems were reviewed and are negative, except as noted.    VITALS/PHYSICAL EXAM  --------------------------------------------------------------------------------  T(C): 36.5 (08-23-23 @ 04:21), Max: 36.8 (08-22-23 @ 19:37)  HR: 58 (08-23-23 @ 04:21) (57 - 64)  BP: 114/63 (08-23-23 @ 04:21) (114/63 - 122/60)  RR: 18 (08-23-23 @ 04:21) (18 - 18)  SpO2: 95% (08-23-23 @ 04:21) (95% - 97%)  Wt(kg): --        08-22-23 @ 07:01  -  08-23-23 @ 07:00  --------------------------------------------------------  IN: 652.5 mL / OUT: 675 mL / NET: -22.5 mL        Physical Exam:  Gen: NAD, well-appearing  	HEENT:  supple neck, clear oropharynx  	Pulm: CTA B/L  	CV: RRR, S1S2; no rub  	Abd: +BS, soft, nontender/nondistended  	: No suprapubic tenderness  	UE: Warm, no edema; no asterixis  	LE: Warm, trace edema  	Neuro: No focal deficits, moving all 4 limbs  	Psych: Normal affect and mood  	Skin: Warm, without rashes  	Vascular access: tunneled dialysis catheter, left forearm AVF.       LABS/STUDIES  --------------------------------------------------------------------------------              10.0   5.39  >-----------<  128      [08-23-23 @ 04:31]              31.5     138  |  102  |  35  ----------------------------<  84      [08-23-23 @ 04:31]  3.7   |  28  |  2.21        Ca     9.6     [08-23-23 @ 04:31]      Mg     1.6     [08-23-23 @ 04:31]      Phos  3.5     [08-23-23 @ 04:31]      PT/INR: PT 11.7 , INR 1.12       [08-23-23 @ 04:31]  PTT: 30.7       [08-23-23 @ 04:31]      Creatinine Trend:  SCr 2.21 [08-23 @ 04:31]  SCr 2.00 [08-22 @ 04:31]  SCr 2.87 [08-21 @ 04:11]  SCr 2.37 [08-20 @ 06:49]  SCr 2.28 [08-19 @ 06:02]    Urinalysis - [08-23-23 @ 04:31]      Color  / Appearance  / SG  / pH       Gluc 84 / Ketone   / Bili  / Urobili        Blood  / Protein  / Leuk Est  / Nitrite       RBC  / WBC  / Hyaline  / Gran  / Sq Epi  / Non Sq Epi  / Bacteria       HBsAb <3.0      [08-11-23 @ 21:13]  HBsAg Nonreact      [08-11-23 @ 21:13]  HBcAb Nonreact      [08-11-23 @ 21:13]      Tacrolimus  Cyclosporine  Sirolimus  Mycophenolate  BK PCR  CMV PCR  Parvo PCR  EBV PCR

## 2023-08-23 NOTE — PROGRESS NOTE ADULT - PROBLEM SELECTOR PLAN 3
Hgb below goal for ESRD.   Please obtain iron studies.      Thank you for this consult.  Saman Black  Nephrology Fellow  Please contact me on TEAMS  After 5 pm please contact the on-call Fellow.

## 2023-08-23 NOTE — PROGRESS NOTE ADULT - ASSESSMENT
BEULAH on HD (M/W/F) via [facility], had extra UF sessions for volume overload, now back to MWF. 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

## 2023-08-24 ENCOUNTER — TRANSCRIPTION ENCOUNTER (OUTPATIENT)
Age: 78
End: 2023-08-24

## 2023-08-24 LAB
APTT BLD: 46.8 SEC — HIGH (ref 24.5–35.6)
APTT BLD: 51.6 SEC — HIGH (ref 24.5–35.6)
APTT BLD: 54 SEC — HIGH (ref 24.5–35.6)
HCT VFR BLD CALC: 31.6 % — LOW (ref 39–50)
HGB BLD-MCNC: 10.1 G/DL — LOW (ref 13–17)
MCHC RBC-ENTMCNC: 28.8 PG — SIGNIFICANT CHANGE UP (ref 27–34)
MCHC RBC-ENTMCNC: 32 GM/DL — SIGNIFICANT CHANGE UP (ref 32–36)
MCV RBC AUTO: 90 FL — SIGNIFICANT CHANGE UP (ref 80–100)
NRBC # BLD: 0 /100 WBCS — SIGNIFICANT CHANGE UP (ref 0–0)
PLATELET # BLD AUTO: 114 K/UL — LOW (ref 150–400)
RBC # BLD: 3.51 M/UL — LOW (ref 4.2–5.8)
RBC # FLD: 16.2 % — HIGH (ref 10.3–14.5)
WBC # BLD: 6.1 K/UL — SIGNIFICANT CHANGE UP (ref 3.8–10.5)
WBC # FLD AUTO: 6.1 K/UL — SIGNIFICANT CHANGE UP (ref 3.8–10.5)

## 2023-08-24 RX ADMIN — SIMVASTATIN 40 MILLIGRAM(S): 20 TABLET, FILM COATED ORAL at 21:14

## 2023-08-24 RX ADMIN — HEPARIN SODIUM 12.5 UNIT(S)/HR: 5000 INJECTION INTRAVENOUS; SUBCUTANEOUS at 07:18

## 2023-08-24 RX ADMIN — MIDODRINE HYDROCHLORIDE 10 MILLIGRAM(S): 2.5 TABLET ORAL at 18:32

## 2023-08-24 RX ADMIN — ESCITALOPRAM OXALATE 5 MILLIGRAM(S): 10 TABLET, FILM COATED ORAL at 14:11

## 2023-08-24 RX ADMIN — MIDODRINE HYDROCHLORIDE 10 MILLIGRAM(S): 2.5 TABLET ORAL at 05:29

## 2023-08-24 RX ADMIN — Medication 137 MICROGRAM(S): at 05:29

## 2023-08-24 RX ADMIN — MIDODRINE HYDROCHLORIDE 10 MILLIGRAM(S): 2.5 TABLET ORAL at 14:11

## 2023-08-24 RX ADMIN — Medication 1000 MILLIGRAM(S): at 09:30

## 2023-08-24 RX ADMIN — PANTOPRAZOLE SODIUM 40 MILLIGRAM(S): 20 TABLET, DELAYED RELEASE ORAL at 05:29

## 2023-08-24 RX ADMIN — Medication 100 MILLIGRAM(S): at 05:30

## 2023-08-24 RX ADMIN — LISINOPRIL 2.5 MILLIGRAM(S): 2.5 TABLET ORAL at 05:29

## 2023-08-24 RX ADMIN — HEPARIN SODIUM 12.5 UNIT(S)/HR: 5000 INJECTION INTRAVENOUS; SUBCUTANEOUS at 01:16

## 2023-08-24 NOTE — PROGRESS NOTE ADULT - ASSESSMENT
78  year old male    h/o  CAD s/p stent , asa.     A-fib/ PPM, , hypothyroid, and total left knee replacement    prior ppm  interrogation  with  WCT   Ct chest, retrosternal hematoma.  mod left pl effusion/ has   c/c leg  pain/  h/o  non complaince  with meds  in the  past      admitted  with  abd pain and rectal bleeding onset 1 day ago.. arrive s form n home     has  no pain  now    c/c   systolic  and  diastolic  chf,,  cardiomyopathy,. Mitral  valvuloplasty ,  h/o  c/c  l/edema  of L  leg    h/o    c/c AFIB,   has  PPM        eliquis   on  hold  now      h/o c/c   Anemia, , hb is  12.  gi   known to  dr joann INFANTE,     cath, with 2 vessel disease,  s/p  CABG and  MVR  surg cordelia grdier      echo,  on 7/2022,  ef  35/ new/ severe  TR,  and ,  cath,   was non  obstructive     s/p   MSSA  bacterinia , in march 2023, completed  iv ancef..    s/p   explant pf  ppm  and  Micra  placement on  3/30/23. dr sunitha rocha      Echo,  3/2023,  ef  25,  mod  AI. severe  TR . RV hypokinesis    BEULAH, from  AIN, dx  recently , ancef / was  switched   to iv vanco,   CKD, on HD,  has  right Permcath.house  renal   s/p  renal bx on 4/ 14.. 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 + , Ig A  Lamda. ,  known to heme  dr dobson.  pe r heme,  no  need  for  b marrow  bx    cxr/  CT chest,  pl  effusion,  will need  HD  gi    f/p,  no  bleeding /and   nuclear  scan, no  bleeding   bacteremia   Clostridium cadeveris,  wa s on  iv   ertapenem / completed , per  ID  on iv heparin/  vascular f/p/  awiating  av fistula    awaiting   avf   today ,   by  cordelia duron /  dr mikayla dick       < from: Intra-Operative Transesophageal Echo W or WO Ultrasound Enhancing Agent (03.30.23 @ 12:48) >  --------------------------------------------------------------------------------  PRE-BYPASS FINDINGS  Left Ventricle:  Left ventricular ejection fraction is estimated at 25 to 30%. There is severe kypokinesis in the left ventricle.  Right Ventricle:  Moderately reduced systolic function. Moderately reduced systolic function. There is moderate hypokinesis in the right ventricle.  Left Atrium:  Diffuse smoke visualized in LA.  Aortic Valve:  The aortic valve appears trileaflet. There is moderate aortic regurgitation.  No obvious vegetations visualized  Mitral Valve:  Bioprosthetic valve is present in the mitral position. There is calcification of the mitral valve annulus.  No obvious vegetations visualized.  Tricuspid Valve:  There is moderate-severe tricuspid regurgitation. TR unchanged following lead extraction.  Pulmonic Valve:  There is mild pulmonic regurgitation.  Pericardium:  No pericardial effusion seen. No pericardial effusion visualized before and after lead extraction.  Electronically signed by Jimmy Gambino on 3/30/2023 at 3:00:58 PM  *** Final ***  < end of copied text >                                  78  year old male    h/o  CAD s/p stent , asa.     A-fib/ PPM, , hypothyroid, and total left knee replacement    prior ppm  interrogation  with  WCT   Ct chest, retrosternal hematoma.  mod left pl effusion/ has   c/c leg  pain/  h/o  non complaince  with meds  in the  past      admitted  with  abd pain and rectal bleeding onset 1 day ago.. arrive s form n home     has  no pain  now    c/c   systolic  and  diastolic  chf,,  cardiomyopathy,. Mitral  valvuloplasty ,  h/o  c/c  l/edema  of L  leg    h/o    c/c AFIB,   has  PPM        eliquis   on  hold  now      h/o c/c   Anemia, , hb is  12.  gi   known to  dr joann INFANTE,     cath, with 2 vessel disease,  s/p  CABG and  MVR  surg cordelia grider      echo,  on 7/2022,  ef  35/ new/ severe  TR,  and ,  cath,   was non  obstructive     s/p   MSSA  bacterinia , in march 2023, completed  iv ancef..    s/p   explant pf  ppm  and  Micra  placement on  3/30/23. dr sunitha rocha      Echo,  3/2023,  ef  25,  mod  AI. severe  TR . RV hypokinesis    BEULAH, from  AIN, dx  recently , ancef / was  switched   to iv vanco,   CKD, on HD,  has  right Permcath.house  renal   s/p  renal bx on 4/ 14.. 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 + , Ig A  Lamda. ,  known to heme  dr dobson.  pe r heme,  no  need  for  b marrow  bx    cxr/  CT chest,  pl  effusion,  will need  HD  gi    f/p,  no  bleeding /and   nuclear  scan, no  bleeding   bacteremia   Clostridium cadeveris,  wa s on  iv   ertapenem / completed , per  ID  on iv heparin/  vascular f/p/  awiating  av fistula    awaiting   avf   today ,   by  cordelia duron/  syed  by  med  and  acrd                     card /  dr mikayla dick       < from: Intra-Operative Transesophageal Echo W or WO Ultrasound Enhancing Agent (03.30.23 @ 12:48) >  --------------------------------------------------------------------------------  PRE-BYPASS FINDINGS  Left Ventricle:  Left ventricular ejection fraction is estimated at 25 to 30%. There is severe kypokinesis in the left ventricle.  Right Ventricle:  Moderately reduced systolic function. Moderately reduced systolic function. There is moderate hypokinesis in the right ventricle.  Left Atrium:  Diffuse smoke visualized in LA.  Aortic Valve:  The aortic valve appears trileaflet. There is moderate aortic regurgitation.  No obvious vegetations visualized  Mitral Valve:  Bioprosthetic valve is present in the mitral position. There is calcification of the mitral valve annulus.  No obvious vegetations visualized.  Tricuspid Valve:  There is moderate-severe tricuspid regurgitation. TR unchanged following lead extraction.  Pulmonic Valve:  There is mild pulmonic regurgitation.  Pericardium:  No pericardial effusion seen. No pericardial effusion visualized before and after lead extraction.  Electronically signed by Jimmy Gambino on 3/30/2023 at 3:00:58 PM  *** Final ***  < end of copied text >

## 2023-08-24 NOTE — PRE PROCEDURE NOTE - PRE PROCEDURE EVALUATION
------------------------------------------------------------  Vascular Surgery Pre-Procedure Note  ------------------------------------------------------------    Indication: 78y Male    Past Medical History:  Afib    CAD (coronary artery disease)    Varicose vein of leg    Hypothyroid    Pacemaker    H/O fracture of hip    H/O asbestosis    HTN (hypertension)        Allergies: Myrbetriq (Hives)  tamsulosin (Hives)  alfuzosin (Hives)  levofloxacin (Hives)      Medications:    heparin  Infusion: 12.5 mL/Hr IV Continuous (08-24-23 @ 01:17)  lisinopril: 2.5 milliGRAM(s) Oral (08-24-23 @ 05:29)  metoprolol succinate ER: 100 milliGRAM(s) Oral (08-24-23 @ 05:30)  midodrine.: 10 milliGRAM(s) Oral (08-24-23 @ 05:29)      Vital Signs:   T(F): 98.2 (08:21), Max: 98.6 (17:49)  HR: 59 (08:21)  BP: 116/61 (08:21)  RR: 18 (08:21)  SpO2: 93% (08:21)    Labs:           10.1  6.10)-----(114     (08-24-23 @ 00:24)         31.6     138 | 102 | 35  --------------------< 84     (08-23-23 @ 04:31)  3.7 | 28 | 2.21       PT: -- 08-24-23 @ 00:24  aPTT: 46.8<H> 08-24-23 @ 00:24   INR: -- 08-24-23 @ 00:24    Imaging:    Consent: In chart    Procedure Plan: Left AV fistula creation, possible graft, possible right  with Dr. Jules    Plan:   - NPO at midnight  - Preop labs: CBC, BMP, Mg, Phos, PT/PTT/INR, Type & cross still valid from 8/23/23.  - Cardiology clearance documented  - Pending Medicine clearance documentation   - Hold heparin ggt at 6 AM  - Patient had HD 8/23

## 2023-08-24 NOTE — PRE PROCEDURE NOTE - ATTENDING COMMENTS
left AVF possible AVG   all risks benefits and alternatives were discussed  he wished to proceed
agree

## 2023-08-24 NOTE — PROGRESS NOTE ADULT - SUBJECTIVE AND OBJECTIVE BOX
Date of Service: 08-24-23 @ 06:27           CARDIOLOGY     PROGRESS  NOTE   ________________________________________________    CHIEF COMPLAINT:Patient is a 78y old  Male who presents with a chief complaint of abdominal pain/ rectal bleed (23 Aug 2023 12:13)  no complain  	  REVIEW OF SYSTEMS:  CONSTITUTIONAL: No fever, weight loss, or fatigue  EYES: No eye pain, visual disturbances, or discharge  ENT:  No difficulty hearing, tinnitus, vertigo; No sinus or throat pain  NECK: No pain or stiffness  RESPIRATORY: No cough, wheezing, chills or hemoptysis; decrease  Shortness of Breath  CARDIOVASCULAR: No chest pain, palpitations, passing out, dizziness, or leg swelling  GASTROINTESTINAL: No abdominal or epigastric pain. No nausea, vomiting, or hematemesis; No diarrhea or constipation. No melena or hematochezia.  GENITOURINARY: No dysuria, frequency, hematuria, or incontinence  NEUROLOGICAL: No headaches, memory loss, loss of strength, numbness, or tremors  SKIN: No itching, burning, rashes, or lesions   LYMPH Nodes: No enlarged glands  ENDOCRINE: No heat or cold intolerance; No hair loss  MUSCULOSKELETAL: No joint pain or swelling; No muscle, back, or extremity pain  PSYCHIATRIC: No depression, anxiety, mood swings, or difficulty sleeping  HEME/LYMPH: No easy bruising, or bleeding gums  ALLERGY AND IMMUNOLOGIC: No hives or eczema	    [ x] All others negative	  [ ] Unable to obtain    PHYSICAL EXAM:  T(C): 37 (08-24-23 @ 05:12), Max: 37 (08-23-23 @ 17:49)  HR: 58 (08-24-23 @ 05:12) (54 - 58)  BP: 106/57 (08-24-23 @ 05:12) (106/57 - 115/65)  RR: 18 (08-24-23 @ 05:12) (18 - 18)  SpO2: 97% (08-24-23 @ 05:12) (92% - 97%)  Wt(kg): --  I&O's Summary    22 Aug 2023 07:01  -  23 Aug 2023 07:00  --------------------------------------------------------  IN: 652.5 mL / OUT: 675 mL / NET: -22.5 mL    23 Aug 2023 07:01  -  24 Aug 2023 06:27  --------------------------------------------------------  IN: 360 mL / OUT: 3525 mL / NET: -3165 mL        Appearance: Normal	  HEENT:   Normal oral mucosa, PERRL, EOMI	  Lymphatic: No lymphadenopathy  Cardiovascular: Normal S1 S2, No JVD, + murmurs, No edema  Respiratory: rhonchi  Psychiatry: A & O x 3, Mood & affect appropriate  Gastrointestinal:  Soft, Non-tender, + BS	  Skin: No rashes, No ecchymoses, No cyanosis	  Neurologic: Non-focal  Extremities: Normal range of motion, No clubbing, cyanosis or edema  Vascular: Peripheral pulses palpable 2+ bilaterally    MEDICATIONS  (STANDING):  epoetin val (EPOGEN) Injectable 4000 Unit(s) IV Push <User Schedule>  erythromycin   Ointment 1 Application(s) Left EYE three times a day  escitalopram 5 milliGRAM(s) Oral daily  heparin  Infusion 1100 Unit(s)/Hr (12.5 mL/Hr) IV Continuous <Continuous>  hydrocortisone hemorrhoidal Suppository 1 Suppository(s) Rectal two times a day  levothyroxine 137 MICROGram(s) Oral daily  lisinopril 2.5 milliGRAM(s) Oral daily  mesalamine Suppository 1000 milliGRAM(s) Rectal at bedtime  metoprolol succinate  milliGRAM(s) Oral daily  midodrine. 10 milliGRAM(s) Oral three times a day  pantoprazole    Tablet 40 milliGRAM(s) Oral before breakfast  polyethylene glycol 3350 17 Gram(s) Oral daily  senna 2 Tablet(s) Oral at bedtime  simvastatin 40 milliGRAM(s) Oral at bedtime      TELEMETRY: 	    ECG:  	  RADIOLOGY:  OTHER: 	  	  LABS:	 	    CARDIAC MARKERS:                                10.1   6.10  )-----------( 114      ( 24 Aug 2023 00:24 )             31.6     08-23    138  |  102  |  35<H>  ----------------------------<  84  3.7   |  28  |  2.21<H>    Ca    9.6      23 Aug 2023 04:31  Phos  3.5     08-23  Mg     1.6     08-23      proBNP:   Lipid Profile:   HgA1c:   TSH:   PT/INR - ( 23 Aug 2023 04:31 )   PT: 11.7 sec;   INR: 1.12 ratio         PTT - ( 24 Aug 2023 00:24 )  PTT:46.8 sec    < from: TTE W or WO Ultrasound Enhancing Agent (08.21.23 @ 07:52) >   1. Left ventricular systolic function is normal with an ejection fraction of 53 % by Contreras's method of disks.   2. Mildly enlarged right ventricular cavity size, normal right ventricular wall thickness and reduced systolic right ventricular function.   3. The left atrium is severely dilated in size.   4. The right atrium is severely dilated in size.   5. Mild-moderate tricuspid regurgitation.   6. Bioprosthetic valve present in the mitral position. Well seated prosthetic mitral valve with normal function. There is No intravalvular regurgitation.   7. Mild-to-moderate aortic regurgitation.   8. No obvious evidence of endocarditis. Consider ERENDIRA if clinically indicated.   9. Compared to the transthoracic echocardiogram performed on 3/29/2023, no significant changes.      Assessment and plan  ---------------------------  Liam Garza is 79 y.o. M with PMHx Significant for HLD, A-fib, BPH, ESRD, CANDY, who presents to the ED with c.o. abd pain, rectal pain, and bloody stools.  Per patient, he has been experiencing abd pain and rectal bleeding onset 1 day ago.  Patient notes pain is localized to the suprapubic area, radiates to the RLQ.  He notes pain is intermittent and has had minimal improvement with Tylenol.  Nothing seems to exacerbate his pain.  His only other associated symptom is chills.  Otherwise he denies fevers, N/V, HA, changes in his vision, sore throat, SOB, CP, diarrhea or constipation. Blood per rectum is accumulated in his depends. Of note, patient notes rectal pain began in May which she describes as sharp and has been constant since onset.  pt with hx of chronic a.fib, chf systolic EF 35 to 40% s/p ppm ,MICRA sec to ppm exrtacction sec to + BC.  sob sec acute on chronic chf systolc, fluid overload  renal eval possible HD today  ?rectal bleed hold AC, GI eval  ASHD / A. FIb continue beta blocker   repeat echo with hx of pericardial effusion  GI/ MED appreciated  pulmonary edema/ chf, repeat echo check MV, continue HD with increase fluid withdrawal as tolerated  advance diet as  tolerated  doing better, oob to chair/ physical therapy  blood cultures are still +, ID appreciated awaiting ECHO concern about bioprosthetic MV, may need ERENDIRA/ id noted  if no improvement in Ef pt will need AICD in 3 months as per primary prevention  vascular consulted for  av matthew/ fistulae, appreciated /vein mapping  pt is clear cardiac wise for the vascular procedure today  will dc ACE sec to low bp and normal EF on this echo

## 2023-08-24 NOTE — PROGRESS NOTE ADULT - SUBJECTIVE AND OBJECTIVE BOX
date of service: 08-24-23 @ 09:16  afberile  REVIEW OF SYSTEMS:  CONSTITUTIONAL: No fever,  no  weight loss  ENT:  No  tinnitus,   no   vertigo  NECK: No pain or stiffness  RESPIRATORY: No cough, wheezing, chills or hemoptysis;    No Shortness of Breath  CARDIOVASCULAR: No chest pain, palpitations, dizziness  GASTROINTESTINAL: No abdominal or epigastric pain. No nausea, vomiting, or hematemesis; No diarrhea  No melena or hematochezia.  GENITOURINARY: No dysuria, frequency, hematuria, or incontinence  NEUROLOGICAL: No headaches  SKIN: No itching,  no   rash  LYMPH Nodes: No enlarged glands  ENDOCRINE: No heat or cold intolerance  MUSCULOSKELETAL: No joint pain or swelling  PSYCHIATRIC: No depression, anxiety  HEME/LYMPH: No easy bruising, or bleeding gums  ALLERGY AND IMMUNOLOGIC: No hives or eczema	    MEDICATIONS  (STANDING):  epoetin val (EPOGEN) Injectable 4000 Unit(s) IV Push <User Schedule>  erythromycin   Ointment 1 Application(s) Left EYE three times a day  escitalopram 5 milliGRAM(s) Oral daily  heparin  Infusion 1100 Unit(s)/Hr (12.5 mL/Hr) IV Continuous <Continuous>  hydrocortisone hemorrhoidal Suppository 1 Suppository(s) Rectal two times a day  levothyroxine 137 MICROGram(s) Oral daily  mesalamine Suppository 1000 milliGRAM(s) Rectal at bedtime  metoprolol succinate  milliGRAM(s) Oral daily  midodrine. 10 milliGRAM(s) Oral three times a day  pantoprazole    Tablet 40 milliGRAM(s) Oral before breakfast  polyethylene glycol 3350 17 Gram(s) Oral daily  senna 2 Tablet(s) Oral at bedtime  simvastatin 40 milliGRAM(s) Oral at bedtime    MEDICATIONS  (PRN):  aluminum hydroxide/magnesium hydroxide/simethicone Suspension 30 milliLiter(s) Oral every 6 hours PRN Dyspepsia  artificial  tears Solution 1 Drop(s) Both EYES four times a day PRN Dry Eyes  midodrine. 20 milliGRAM(s) Oral <User Schedule> PRN PRIOR TO HD  sodium chloride 0.9% Bolus. 100 milliLiter(s) IV Bolus every 5 minutes PRN SBP LESS THAN or EQUAL to 90 mmHg      Vital Signs Last 24 Hrs  T(C): 36.8 (24 Aug 2023 08:21), Max: 37 (23 Aug 2023 17:49)  T(F): 98.2 (24 Aug 2023 08:21), Max: 98.6 (23 Aug 2023 17:49)  HR: 59 (24 Aug 2023 08:21) (54 - 59)  BP: 116/61 (24 Aug 2023 08:21) (106/57 - 116/61)  BP(mean): --  RR: 18 (24 Aug 2023 08:21) (18 - 18)  SpO2: 93% (24 Aug 2023 08:21) (92% - 97%)    Parameters below as of 24 Aug 2023 08:21  Patient On (Oxygen Delivery Method): room air      CAPILLARY BLOOD GLUCOSE        I&O's Summary    23 Aug 2023 07:01  -  24 Aug 2023 07:00  --------------------------------------------------------  IN: 360 mL / OUT: 3525 mL / NET: -3165 mL          Appearance: Normal	  HEENT:   Normal oral mucosa, PERRL, EOMI	  Lymphatic: No lymphadenopathy  Cardiovascular: Normal S1 S2, No JVD  Respiratory: Lungs clear to auscultation	  Gastrointestinal:  Soft, Non-tender, + BS	  Skin: No rash, No ecchymoses	  Extremities:     LABS:                        10.1   6.10  )-----------( 114      ( 24 Aug 2023 00:24 )             31.6     08-23    138  |  102  |  35<H>  ----------------------------<  84  3.7   |  28  |  2.21<H>    Ca    9.6      23 Aug 2023 04:31  Phos  3.5     08-23  Mg     1.6     08-23      PT/INR - ( 23 Aug 2023 04:31 )   PT: 11.7 sec;   INR: 1.12 ratio         PTT - ( 24 Aug 2023 00:24 )  PTT:46.8 sec      Urinalysis Basic - ( 23 Aug 2023 04:31 )    Color: x / Appearance: x / SG: x / pH: x  Gluc: 84 mg/dL / Ketone: x  / Bili: x / Urobili: x   Blood: x / Protein: x / Nitrite: x   Leuk Esterase: x / RBC: x / WBC x   Sq Epi: x / Non Sq Epi: x / Bacteria: x                      Consultant(s) Notes Reviewed:      Care Discussed with Consultants/Other Providers:

## 2023-08-25 ENCOUNTER — APPOINTMENT (OUTPATIENT)
Dept: VASCULAR SURGERY | Facility: HOSPITAL | Age: 78
End: 2023-08-25

## 2023-08-25 DIAGNOSIS — N18.6 END STAGE RENAL DISEASE: ICD-10-CM

## 2023-08-25 LAB
ANION GAP SERPL CALC-SCNC: 10 MMOL/L — SIGNIFICANT CHANGE UP (ref 5–17)
APTT BLD: 52.6 SEC — HIGH (ref 24.5–35.6)
BUN SERPL-MCNC: 47 MG/DL — HIGH (ref 7–23)
CALCIUM SERPL-MCNC: 9.9 MG/DL — SIGNIFICANT CHANGE UP (ref 8.4–10.5)
CHLORIDE SERPL-SCNC: 103 MMOL/L — SIGNIFICANT CHANGE UP (ref 96–108)
CO2 SERPL-SCNC: 26 MMOL/L — SIGNIFICANT CHANGE UP (ref 22–31)
CREAT SERPL-MCNC: 2.55 MG/DL — HIGH (ref 0.5–1.3)
EGFR: 25 ML/MIN/1.73M2 — LOW
GLUCOSE SERPL-MCNC: 97 MG/DL — SIGNIFICANT CHANGE UP (ref 70–99)
HCT VFR BLD CALC: 30.9 % — LOW (ref 39–50)
HGB BLD-MCNC: 9.8 G/DL — LOW (ref 13–17)
INR BLD: 1.11 RATIO — SIGNIFICANT CHANGE UP (ref 0.85–1.18)
MAGNESIUM SERPL-MCNC: 1.6 MG/DL — SIGNIFICANT CHANGE UP (ref 1.6–2.6)
MCHC RBC-ENTMCNC: 28.6 PG — SIGNIFICANT CHANGE UP (ref 27–34)
MCHC RBC-ENTMCNC: 31.7 GM/DL — LOW (ref 32–36)
MCV RBC AUTO: 90.1 FL — SIGNIFICANT CHANGE UP (ref 80–100)
NRBC # BLD: 0 /100 WBCS — SIGNIFICANT CHANGE UP (ref 0–0)
PHOSPHATE SERPL-MCNC: 3.2 MG/DL — SIGNIFICANT CHANGE UP (ref 2.5–4.5)
PLATELET # BLD AUTO: 134 K/UL — LOW (ref 150–400)
POTASSIUM SERPL-MCNC: 4.2 MMOL/L — SIGNIFICANT CHANGE UP (ref 3.5–5.3)
POTASSIUM SERPL-SCNC: 4.2 MMOL/L — SIGNIFICANT CHANGE UP (ref 3.5–5.3)
PROTHROM AB SERPL-ACNC: 11.6 SEC — SIGNIFICANT CHANGE UP (ref 9.5–13)
RBC # BLD: 3.43 M/UL — LOW (ref 4.2–5.8)
RBC # FLD: 16.4 % — HIGH (ref 10.3–14.5)
SODIUM SERPL-SCNC: 139 MMOL/L — SIGNIFICANT CHANGE UP (ref 135–145)
WBC # BLD: 6.56 K/UL — SIGNIFICANT CHANGE UP (ref 3.8–10.5)
WBC # FLD AUTO: 6.56 K/UL — SIGNIFICANT CHANGE UP (ref 3.8–10.5)

## 2023-08-25 PROCEDURE — 36821 AV FUSION DIRECT ANY SITE: CPT

## 2023-08-25 PROCEDURE — 93279 PRGRMG DEV EVAL PM/LDLS PM: CPT | Mod: 26

## 2023-08-25 PROCEDURE — 99232 SBSQ HOSP IP/OBS MODERATE 35: CPT

## 2023-08-25 DEVICE — CLIP APPLIER COVIDIEN SURGICLIP III 9" SM: Type: IMPLANTABLE DEVICE | Site: LEFT | Status: FUNCTIONAL

## 2023-08-25 DEVICE — SURGICEL 2 X 14": Type: IMPLANTABLE DEVICE | Site: LEFT | Status: FUNCTIONAL

## 2023-08-25 RX ORDER — ERYTHROPOIETIN 10000 [IU]/ML
4000 INJECTION, SOLUTION INTRAVENOUS; SUBCUTANEOUS
Refills: 0 | Status: DISCONTINUED | OUTPATIENT
Start: 2023-08-25 | End: 2023-08-25

## 2023-08-25 RX ORDER — HYDROMORPHONE HYDROCHLORIDE 2 MG/ML
0.25 INJECTION INTRAMUSCULAR; INTRAVENOUS; SUBCUTANEOUS
Refills: 0 | Status: DISCONTINUED | OUTPATIENT
Start: 2023-08-25 | End: 2023-08-25

## 2023-08-25 RX ORDER — SIMVASTATIN 20 MG/1
40 TABLET, FILM COATED ORAL AT BEDTIME
Refills: 0 | Status: DISCONTINUED | OUTPATIENT
Start: 2023-08-25 | End: 2023-08-30

## 2023-08-25 RX ORDER — METOPROLOL TARTRATE 50 MG
100 TABLET ORAL DAILY
Refills: 0 | Status: DISCONTINUED | OUTPATIENT
Start: 2023-08-25 | End: 2023-08-30

## 2023-08-25 RX ORDER — ERYTHROPOIETIN 10000 [IU]/ML
4000 INJECTION, SOLUTION INTRAVENOUS; SUBCUTANEOUS
Refills: 0 | Status: DISCONTINUED | OUTPATIENT
Start: 2023-08-25 | End: 2023-08-30

## 2023-08-25 RX ORDER — HYDROCORTISONE 1 %
1 OINTMENT (GRAM) TOPICAL
Refills: 0 | Status: DISCONTINUED | OUTPATIENT
Start: 2023-08-25 | End: 2023-08-30

## 2023-08-25 RX ORDER — MIDODRINE HYDROCHLORIDE 2.5 MG/1
20 TABLET ORAL
Refills: 0 | Status: DISCONTINUED | OUTPATIENT
Start: 2023-08-25 | End: 2023-08-30

## 2023-08-25 RX ORDER — MESALAMINE 400 MG
1000 TABLET, DELAYED RELEASE (ENTERIC COATED) ORAL AT BEDTIME
Refills: 0 | Status: DISCONTINUED | OUTPATIENT
Start: 2023-08-25 | End: 2023-08-30

## 2023-08-25 RX ORDER — LEVOTHYROXINE SODIUM 125 MCG
137 TABLET ORAL DAILY
Refills: 0 | Status: DISCONTINUED | OUTPATIENT
Start: 2023-08-25 | End: 2023-08-30

## 2023-08-25 RX ORDER — SENNA PLUS 8.6 MG/1
2 TABLET ORAL AT BEDTIME
Refills: 0 | Status: DISCONTINUED | OUTPATIENT
Start: 2023-08-25 | End: 2023-08-30

## 2023-08-25 RX ORDER — MIDODRINE HYDROCHLORIDE 2.5 MG/1
10 TABLET ORAL THREE TIMES A DAY
Refills: 0 | Status: DISCONTINUED | OUTPATIENT
Start: 2023-08-25 | End: 2023-08-30

## 2023-08-25 RX ORDER — ONDANSETRON 8 MG/1
4 TABLET, FILM COATED ORAL ONCE
Refills: 0 | Status: DISCONTINUED | OUTPATIENT
Start: 2023-08-25 | End: 2023-08-25

## 2023-08-25 RX ORDER — FENTANYL CITRATE 50 UG/ML
50 INJECTION INTRAVENOUS
Refills: 0 | Status: DISCONTINUED | OUTPATIENT
Start: 2023-08-25 | End: 2023-08-25

## 2023-08-25 RX ORDER — PANTOPRAZOLE SODIUM 20 MG/1
40 TABLET, DELAYED RELEASE ORAL
Refills: 0 | Status: DISCONTINUED | OUTPATIENT
Start: 2023-08-25 | End: 2023-08-30

## 2023-08-25 RX ORDER — POLYETHYLENE GLYCOL 3350 17 G/17G
17 POWDER, FOR SOLUTION ORAL DAILY
Refills: 0 | Status: DISCONTINUED | OUTPATIENT
Start: 2023-08-25 | End: 2023-08-30

## 2023-08-25 RX ORDER — ERYTHROMYCIN BASE 5 MG/GRAM
1 OINTMENT (GRAM) OPHTHALMIC (EYE) THREE TIMES A DAY
Refills: 0 | Status: DISCONTINUED | OUTPATIENT
Start: 2023-08-25 | End: 2023-08-29

## 2023-08-25 RX ORDER — ESCITALOPRAM OXALATE 10 MG/1
5 TABLET, FILM COATED ORAL DAILY
Refills: 0 | Status: DISCONTINUED | OUTPATIENT
Start: 2023-08-25 | End: 2023-08-30

## 2023-08-25 RX ADMIN — MIDODRINE HYDROCHLORIDE 10 MILLIGRAM(S): 2.5 TABLET ORAL at 05:08

## 2023-08-25 RX ADMIN — PANTOPRAZOLE SODIUM 40 MILLIGRAM(S): 20 TABLET, DELAYED RELEASE ORAL at 05:08

## 2023-08-25 RX ADMIN — Medication 100 MILLIGRAM(S): at 05:08

## 2023-08-25 RX ADMIN — MIDODRINE HYDROCHLORIDE 10 MILLIGRAM(S): 2.5 TABLET ORAL at 16:52

## 2023-08-25 RX ADMIN — Medication 137 MICROGRAM(S): at 05:08

## 2023-08-25 RX ADMIN — SIMVASTATIN 40 MILLIGRAM(S): 20 TABLET, FILM COATED ORAL at 21:34

## 2023-08-25 RX ADMIN — Medication 1000 MILLIGRAM(S): at 22:12

## 2023-08-25 RX ADMIN — POLYETHYLENE GLYCOL 3350 17 GRAM(S): 17 POWDER, FOR SOLUTION ORAL at 18:30

## 2023-08-25 RX ADMIN — SENNA PLUS 2 TABLET(S): 8.6 TABLET ORAL at 21:33

## 2023-08-25 RX ADMIN — ESCITALOPRAM OXALATE 5 MILLIGRAM(S): 10 TABLET, FILM COATED ORAL at 18:29

## 2023-08-25 NOTE — PRE-OP CHECKLIST - 2.
Missed appt w/ .called & some one answered but didn't  say anything. patient oriented to unit and procedure

## 2023-08-25 NOTE — PROGRESS NOTE ADULT - SUBJECTIVE AND OBJECTIVE BOX
Surgery Daily Progress Note  =====================================================  SUBJECTIVE: A 78y Male Patient seen and examined at bedside on AM rounds.  Patient is feeling fine.    ALLERGIES:  Myrbetriq (Hives)  tamsulosin (Hives)  alfuzosin (Hives)  levofloxacin (Hives)      --------------------------------------------------------------------------------------    MEDICATIONS:    Neurologic Medications    Respiratory Medications    Cardiovascular Medications    Gastrointestinal Medications    Genitourinary Medications    Hematologic/Oncologic Medications    Antimicrobial/Immunologic Medications    Endocrine/Metabolic Medications    Topical/Other Medications    --------------------------------------------------------------------------------------    VITAL SIGNS:  Myrbetriq (Hives)  tamsulosin (Hives)  alfuzosin (Hives)  levofloxacin (Hives)      T(C): 36.4 (08-25-23 @ 13:06), Max: 37.2 (08-24-23 @ 19:58)  HR: 56 (08-25-23 @ 13:06) (56 - 78)  BP: 126/66 (08-25-23 @ 13:06) (104/67 - 134/70)  RR: 18 (08-25-23 @ 13:06) (18 - 18)  SpO2: 95% (08-25-23 @ 13:06) (90% - 95%)  --------------------------------------------------------------------------------------    PHYSICAL EXAM:   General: NAD, Lying in bed comfortably  Neuro: A+Ox3  HEENT: NC/AT, EOMI  Neck: Soft, supple  Cardio: RRR, nml S1/S2  Resp: Good effort, CTA b/l  GI/Abd: Soft, NT/ND, no rebound/guarding, no masses palpated  Vascular: Bilateral arms are warm.       --------------------------------------------------------------------------------------    I&Os  T(C): 36.4 (08-25-23 @ 13:06), Max: 37.2 (08-24-23 @ 19:58)  HR: 56 (08-25-23 @ 13:06) (56 - 78)  BP: 126/66 (08-25-23 @ 13:06) (104/67 - 134/70)  RR: 18 (08-25-23 @ 13:06) (18 - 18)  SpO2: 95% (08-25-23 @ 13:06) (90% - 95%)  --------------------------------------------------------------------------------------    LABS                          9.8    6.56  )-----------( 134      ( 25 Aug 2023 04:42 )             30.9     08-25    139  |  103  |  47<H>  ----------------------------<  97  4.2   |  26  |  2.55<H>    Ca    9.9      25 Aug 2023 04:42  Phos  3.2     08-25  Mg     1.6     08-25      PT/INR - ( 25 Aug 2023 04:42 )   PT: 11.6 sec;   INR: 1.11 ratio         PTT - ( 25 Aug 2023 04:42 )  PTT:52.6 sec  Urinalysis Basic - ( 25 Aug 2023 04:42 )    Color: x / Appearance: x / SG: x / pH: x  Gluc: 97 mg/dL / Ketone: x  / Bili: x / Urobili: x   Blood: x / Protein: x / Nitrite: x   Leuk Esterase: x / RBC: x / WBC x   Sq Epi: x / Non Sq Epi: x / Bacteria: x        08-24-23 @ 07:01  -  08-25-23 @ 07:00  --------------------------------------------------------  IN: 0 mL / OUT: 400 mL / NET: -400 mL    08-25-23 @ 07:01  -  08-25-23 @ 14:40  --------------------------------------------------------  IN: 0 mL / OUT: 0 mL / NET: 0 mL          RADIOLOGY, EKG & ADDITIONAL TESTS: Reviewed.

## 2023-08-25 NOTE — PROGRESS NOTE ADULT - ASSESSMENT
A 79 y.o. M with PMHx Significant for HLD, A-fib, BPH, ESRD, CANDY, who presents to the ED with c.o. abd pain, rectal pain, and bloody stools.  Patient is on dialysis MWF. Vascular surgery is consulted for AVF creation. Patient endorses having a pacemaker.    Plan:  - OR today  - Post-operative check    Vascular Surgery   [id number]

## 2023-08-25 NOTE — PROGRESS NOTE ADULT - ASSESSMENT
78  year old male    h/o  CAD s/p stent , asa.     A-fib/ PPM, , hypothyroid, and total left knee replacement    prior ppm  interrogation  with  WCT   Ct chest, retrosternal hematoma.  mod left pl effusion/ has   c/c leg  pain/  h/o  non complaince  with meds  in the  past      admitted  with  abd pain and rectal bleeding onset 1 day ago.. arrive s form n home     has  no pain  now    c/c   systolic  and  diastolic  chf,,  cardiomyopathy,. Mitral  valvuloplasty ,  h/o  c/c  l/edema  of L  leg    h/o    c/c AFIB,   has  PPM        eliquis   on  hold  now      h/o c/c   Anemia, , hb is  12.  gi   known to  dr joann INFANTE,     cath, with 2 vessel disease,  s/p  CABG and  MVR  surg d r marni      echo,  on 7/2022,  ef  35/ new/ severe  TR,  and ,  cath,   was non  obstructive     s/p   MSSA  bacterinia , in march 2023, completed  iv ancef..    s/p   explant pf  ppm  and  Micra  placement on  3/30/23. dr sunitha rocha      Echo,  3/2023,  ef  25,  mod  AI. severe  TR . RV hypokinesis    BEULAH, from  [facility], dx  recently , ancef / was  switched   to iv vanco,   CKD, on HD,  has  right Permcath.house  renal   s/p  renal bx on 4/ 14.. 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 + , Ig A  Lamda. ,  known to heme  dr dobson.  pe r heme,  no  need  for  b marrow  bx    cxr/  CT chest,  pl  effusion,   gi    f/p,  no  bleeding /and   nuclear  scan, no  bleeding   bacteremia   Clostridium cadeveris,  iv   ertapenem / completed       AVF  by vascular/  restart  a/c,  when ok  with vaclar          card /  dr mikayla dick       < from: Intra-Operative Transesophageal Echo W or WO Ultrasound Enhancing Agent (03.30.23 @ 12:48) >  --------------------------------------------------------------------------------  PRE-BYPASS FINDINGS  Left Ventricle:  Left ventricular ejection fraction is estimated at 25 to 30%. There is severe kypokinesis in the left ventricle.  Right Ventricle:  Moderately reduced systolic function. Moderately reduced systolic function. There is moderate hypokinesis in the right ventricle.  Left Atrium:  Diffuse smoke visualized in LA.  Aortic Valve:  The aortic valve appears trileaflet. There is moderate aortic regurgitation.  No obvious vegetations visualized  Mitral Valve:  Bioprosthetic valve is present in the mitral position. There is calcification of the mitral valve annulus.  No obvious vegetations visualized.  Tricuspid Valve:  There is moderate-severe tricuspid regurgitation. TR unchanged following lead extraction.  Pulmonic Valve:  There is mild pulmonic regurgitation.  Pericardium:  No pericardial effusion seen. No pericardial effusion visualized before and after lead extraction.  Electronically signed by Jimmy Gambino on 3/30/2023 at 3:00:58 PM  *** Final ***  < end of copied text >

## 2023-08-25 NOTE — PROCEDURE NOTE - NSPROCNAME_GEN_A_CORE
RLE Venous doppler study complete. Preliminary positive for acute popliteal and calf DVT. Positive for acute superficial thrombophlebitis as well called to Linda LAWRENCE. Instructions to send patient back to office. Patient verbalized understanding   
PPM Interrogation Note

## 2023-08-25 NOTE — CHART NOTE - NSCHARTNOTEFT_GEN_A_CORE
POST-OPERATIVE NOTE    Subjective:  Patient is s/p LUE AVF creation. Patient denies any n/v, chest pain, or SOB. Pain is currently well controlled. Recovering appropriately.    Objective:  Physical Exam:  General: NAD, resting comfortably in bed  Pulmonary: Nonlabored breathing, no respiratory distress  Cardiovascular: RRR  Abdominal: soft, nt/nd  Extremities: L radiocephalic fistula with good thrill, dressing c/d/i, palpable radial pulse distally      Vital Signs Last 24 Hrs  T(C): 36.4 (25 Aug 2023 19:21), Max: 37.1 (25 Aug 2023 04:02)  T(F): 97.5 (25 Aug 2023 19:21), Max: 98.8 (25 Aug 2023 04:02)  HR: 71 (25 Aug 2023 19:21) (51 - 77)  BP: 129/62 (25 Aug 2023 19:21) (90/53 - 134/70)  BP(mean): 73 (25 Aug 2023 16:30) (67 - 79)  RR: 18 (25 Aug 2023 19:21) (15 - 19)  SpO2: 93% (25 Aug 2023 19:21) (90% - 100%)    Parameters below as of 25 Aug 2023 19:21  Patient On (Oxygen Delivery Method): room air      I&O's Detail    24 Aug 2023 07:01  -  25 Aug 2023 07:00  --------------------------------------------------------  IN:  Total IN: 0 mL    OUT:    Indwelling Catheter - Urethral (mL): 400 mL    Oral Fluid: 0 mL  Total OUT: 400 mL    Total NET: -400 mL      25 Aug 2023 07:01  -  25 Aug 2023 19:59  --------------------------------------------------------  IN:    Oral Fluid: 100 mL  Total IN: 100 mL    OUT:    Indwelling Catheter - Urethral (mL): 215 mL  Total OUT: 215 mL    Total NET: -115 mL        metoprolol succinate   midodrine. 10  midodrine. 20 PRN    PAST MEDICAL & SURGICAL HISTORY:  Afib  not on anticoagulation      CAD (coronary artery disease)      Varicose vein of leg      Hypothyroid      Pacemaker      H/O fracture of hip  2017      H/O asbestosis      HTN (hypertension)      Pacemaker  [employer] - Model RVDR01 1/10/2012      Stented coronary artery  drug eluding stent 5/2007      H/O detached retina repair  1952      S/P cataract surgery      History of hip surgery  left hip ORIF      H/O varicose vein stripping  1993 left leg      History of left knee replacement  2013 - multiple infections post op requiring reoperation in 2015, 2017, 2019)      H/O foot surgery  for infection of right foot 2019      H/O inguinal hernia repair  right 1993              LABS:                        9.8    6.56  )-----------( 134      ( 25 Aug 2023 04:42 )             30.9     08-25    139  |  103  |  47<H>  ----------------------------<  97  4.2   |  26  |  2.55<H>    Ca    9.9      25 Aug 2023 04:42  Phos  3.2     08-25  Mg     1.6     08-25      PT/INR - ( 25 Aug 2023 04:42 )   PT: 11.6 sec;   INR: 1.11 ratio         PTT - ( 25 Aug 2023 04:42 )  PTT:52.6 sec  CAPILLARY BLOOD GLUCOSE          Radiology and Additional Studies:    Assessment:  The patient is a 78y Male who is now several hours post-op from a LUE AVF creation.     Plan:  - Pain control as needed  - OOB and ambulating as tolerated  - F/u AM labs

## 2023-08-25 NOTE — PROGRESS NOTE ADULT - SUBJECTIVE AND OBJECTIVE BOX
date of service: 08-25-23 @ 09:02  afberile  REVIEW OF SYSTEMS:  CONSTITUTIONAL: No fever,  no  weight loss  ENT:  No  tinnitus,   no   vertigo  NECK: No pain or stiffness  RESPIRATORY: No cough, wheezing, chills or hemoptysis;    No Shortness of Breath  CARDIOVASCULAR: No chest pain, palpitations, dizziness  GASTROINTESTINAL: No abdominal or epigastric pain. No nausea, vomiting, or hematemesis; No diarrhea  No melena or hematochezia.  GENITOURINARY: No dysuria, frequency, hematuria, or incontinence  NEUROLOGICAL: No headaches  SKIN: No itching,  no   rash  LYMPH Nodes: No enlarged glands  ENDOCRINE: No heat or cold intolerance  MUSCULOSKELETAL: No joint pain or swelling  PSYCHIATRIC: No depression, anxiety  HEME/LYMPH: No easy bruising, or bleeding gums  ALLERGY AND IMMUNOLOGIC: No hives or eczema	    MEDICATIONS  (STANDING):  epoetin val (EPOGEN) Injectable 4000 Unit(s) IV Push <User Schedule>  erythromycin   Ointment 1 Application(s) Left EYE three times a day  escitalopram 5 milliGRAM(s) Oral daily  heparin  Infusion 1100 Unit(s)/Hr (12.5 mL/Hr) IV Continuous <Continuous>  hydrocortisone hemorrhoidal Suppository 1 Suppository(s) Rectal two times a day  levothyroxine 137 MICROGram(s) Oral daily  mesalamine Suppository 1000 milliGRAM(s) Rectal at bedtime  metoprolol succinate  milliGRAM(s) Oral daily  midodrine. 10 milliGRAM(s) Oral three times a day  pantoprazole    Tablet 40 milliGRAM(s) Oral before breakfast  polyethylene glycol 3350 17 Gram(s) Oral daily  senna 2 Tablet(s) Oral at bedtime  simvastatin 40 milliGRAM(s) Oral at bedtime    MEDICATIONS  (PRN):  aluminum hydroxide/magnesium hydroxide/simethicone Suspension 30 milliLiter(s) Oral every 6 hours PRN Dyspepsia  artificial  tears Solution 1 Drop(s) Both EYES four times a day PRN Dry Eyes  midodrine. 20 milliGRAM(s) Oral <User Schedule> PRN PRIOR TO HD  sodium chloride 0.9% Bolus. 100 milliLiter(s) IV Bolus every 5 minutes PRN SBP LESS THAN or EQUAL to 90 mmHg      Vital Signs Last 24 Hrs  T(C): 36.8 (25 Aug 2023 08:29), Max: 37.2 (24 Aug 2023 19:58)  T(F): 98.2 (25 Aug 2023 08:29), Max: 99 (24 Aug 2023 19:58)  HR: 56 (25 Aug 2023 08:29) (56 - 78)  BP: 134/70 (25 Aug 2023 08:29) (104/67 - 134/70)  BP(mean): --  RR: 18 (25 Aug 2023 08:29) (18 - 18)  SpO2: 92% (25 Aug 2023 08:29) (90% - 94%)    Parameters below as of 25 Aug 2023 08:29  Patient On (Oxygen Delivery Method): room air      CAPILLARY BLOOD GLUCOSE        I&O's Summary    24 Aug 2023 07:01  -  25 Aug 2023 07:00  --------------------------------------------------------  IN: 0 mL / OUT: 400 mL / NET: -400 mL          Appearance: Normal	  HEENT:   Normal oral mucosa, PERRL, EOMI	  Lymphatic: No lymphadenopathy  Cardiovascular: Normal S1 S2, No JVD  Respiratory: Lungs clear to auscultation	  Gastrointestinal:  Soft, Non-tender, + BS	  Skin: No rash, No ecchymoses	  Extremities:     LABS:                        9.8    6.56  )-----------( 134      ( 25 Aug 2023 04:42 )             30.9     08-25    139  |  103  |  47<H>  ----------------------------<  97  4.2   |  26  |  2.55<H>    Ca    9.9      25 Aug 2023 04:42  Phos  3.2     08-25  Mg     1.6     08-25      PT/INR - ( 25 Aug 2023 04:42 )   PT: 11.6 sec;   INR: 1.11 ratio         PTT - ( 25 Aug 2023 04:42 )  PTT:52.6 sec      Urinalysis Basic - ( 25 Aug 2023 04:42 )    Color: x / Appearance: x / SG: x / pH: x  Gluc: 97 mg/dL / Ketone: x  / Bili: x / Urobili: x   Blood: x / Protein: x / Nitrite: x   Leuk Esterase: x / RBC: x / WBC x   Sq Epi: x / Non Sq Epi: x / Bacteria: x                      Consultant(s) Notes Reviewed:      Care Discussed with Consultants/Other Providers:

## 2023-08-25 NOTE — PROGRESS NOTE ADULT - SUBJECTIVE AND OBJECTIVE BOX
[facility] Division of Kidney Diseases & Hypertension  HEMODIALYSIS NOTE  --------------------------------------------------------------------------------  Chief Complaint: ESRD/Ongoing hemodialysis requirement    24 hour events/subjective: NO shortness of breath. AVF surgery moved to 8/25/2023.         PAST HISTORY  --------------------------------------------------------------------------------  No significant changes to PMH, PSH, FHx, SHx, unless otherwise noted    ALLERGIES & MEDICATIONS  --------------------------------------------------------------------------------  Allergies    Myrbetriq (Hives)  tamsulosin (Hives)  alfuzosin (Hives)  levofloxacin (Hives)    Intolerances      Standing Inpatient Medications    PRN Inpatient Medications      REVIEW OF SYSTEMS  --------------------------------------------------------------------------------  Gen: No weight changes, fatigue, fevers/chills, weakness  Skin: No rashes  Head/Eyes/Ears/Mouth: No headache; Normal hearing; Normal vision w/o blurriness; Respiratory: No dyspnea, cough, wheezing, hemoptysis  CV: No chest pain, PND, orthopnea  GI: No abdominal pain, diarrhea, constipation, nausea, vomiting, melena, hematochezia  : No increased frequency, dysuria, hematuria, nocturia  MSK: No joint pain/swelling; no back pain; + edema  Neuro: No dizziness/lightheadedness, weakness    All other systems were reviewed and are negative, except as noted.      All medications reviewed.         VITALS/PHYSICAL EXAM  --------------------------------------------------------------------------------  T(C): 36.4 (08-25-23 @ 13:06), Max: 37.2 (08-24-23 @ 19:58)  HR: 56 (08-25-23 @ 13:06) (56 - 78)  BP: 126/66 (08-25-23 @ 13:06) (104/67 - 134/70)  RR: 18 (08-25-23 @ 13:06) (18 - 18)  SpO2: 95% (08-25-23 @ 13:06) (90% - 95%)  Wt(kg): --  Height (cm): 170.2 (08-25-23 @ 13:06)  Weight (kg): 81.6 (08-25-23 @ 13:06)  BMI (kg/m2): 28.2 (08-25-23 @ 13:06)  BSA (m2): 1.93 (08-25-23 @ 13:06)      08-24-23 @ 07:01  -  08-25-23 @ 07:00  --------------------------------------------------------  IN: 0 mL / OUT: 400 mL / NET: -400 mL    08-25-23 @ 07:01  -  08-25-23 @ 15:29  --------------------------------------------------------  IN: 0 mL / OUT: 0 mL / NET: 0 mL      Physical Exam:  	Gen: NAD, well-appearing  	HEENT:  supple neck, clear oropharynx  	Pulm: CTA B/L  	CV: RRR, S1S2; no rub  	Abd: +BS, soft, nontender/nondistended  	: No suprapubic tenderness  	UE: Warm, no edema; no asterixis  	LE: Warm, trace edema  	Neuro: No focal deficits, moving all 4 limbs  	Psych: Normal affect and mood  	Skin: Warm, without rashes  	Vascular access: tunneled dialysis catheter.     LABS/STUDIES  --------------------------------------------------------------------------------              9.8    6.56  >-----------<  134      [08-25-23 @ 04:42]              30.9     139  |  103  |  47  ----------------------------<  97      [08-25-23 @ 04:42]  4.2   |  26  |  2.55        Ca     9.9     [08-25-23 @ 04:42]      Mg     1.6     [08-25-23 @ 04:42]      Phos  3.2     [08-25-23 @ 04:42]      PT/INR: PT 11.6 , INR 1.11       [08-25-23 @ 04:42]  PTT: 52.6       [08-25-23 @ 04:42]

## 2023-08-25 NOTE — PROGRESS NOTE ADULT - SUBJECTIVE AND OBJECTIVE BOX
Date of Service: 08-25-23 @ 07:30           CARDIOLOGY     PROGRESS  NOTE   ________________________________________________    CHIEF COMPLAINT:Patient is a 78y old  Male who presents with a chief complaint of abdominal pain/ rectal bleed (24 Aug 2023 09:16)  no complain  	  REVIEW OF SYSTEMS:  CONSTITUTIONAL: No fever, weight loss, or fatigue  EYES: No eye pain, visual disturbances, or discharge  ENT:  No difficulty hearing, tinnitus, vertigo; No sinus or throat pain  NECK: No pain or stiffness  RESPIRATORY: No cough, wheezing, chills or hemoptysis;no Shortness of Breath  CARDIOVASCULAR: No chest pain, palpitations, passing out, dizziness, or leg swelling  GASTROINTESTINAL: No abdominal or epigastric pain. No nausea, vomiting, or hematemesis; No diarrhea or constipation. No melena or hematochezia.  GENITOURINARY: No dysuria, frequency, hematuria, or incontinence  NEUROLOGICAL: No headaches, memory loss, loss of strength, numbness, or tremors  SKIN: No itching, burning, rashes, or lesions   LYMPH Nodes: No enlarged glands  ENDOCRINE: No heat or cold intolerance; No hair loss  MUSCULOSKELETAL: No joint pain or swelling; No muscle, back, or extremity pain  PSYCHIATRIC: No depression, anxiety, mood swings, or difficulty sleeping  HEME/LYMPH: No easy bruising, or bleeding gums  ALLERGY AND IMMUNOLOGIC: No hives or eczema	    [ ] All others negative	  [ ] Unable to obtain    PHYSICAL EXAM:  T(C): 37.1 (08-25-23 @ 04:02), Max: 37.2 (08-24-23 @ 19:58)  HR: 69 (08-25-23 @ 04:02) (59 - 78)  BP: 118/61 (08-25-23 @ 04:02) (104/67 - 124/61)  RR: 18 (08-25-23 @ 04:02) (18 - 18)  SpO2: 94% (08-25-23 @ 04:02) (90% - 94%)  Wt(kg): --  I&O's Summary    24 Aug 2023 07:01  -  25 Aug 2023 07:00  --------------------------------------------------------  IN: 0 mL / OUT: 400 mL / NET: -400 mL        Appearance: Normal	  HEENT:   Normal oral mucosa, PERRL, EOMI	  Lymphatic: No lymphadenopathy  Cardiovascular: Normal S1 S2, No JVD, + murmurs, No edema  Respiratoryrhonchi  Psychiatry: A & O x 3, Mood & affect appropriate  Gastrointestinal:  Soft, Non-tender, + BS	  Skin: No rashes, No ecchymoses, No cyanosis	  Neurologic: Non-focal  Extremities: Normal range of motion, No clubbing, cyanosis or edema  Vascular: Peripheral pulses palpable 2+ bilaterally    MEDICATIONS  (STANDING):  epoetin val (EPOGEN) Injectable 4000 Unit(s) IV Push <User Schedule>  erythromycin   Ointment 1 Application(s) Left EYE three times a day  escitalopram 5 milliGRAM(s) Oral daily  heparin  Infusion 1100 Unit(s)/Hr (12.5 mL/Hr) IV Continuous <Continuous>  hydrocortisone hemorrhoidal Suppository 1 Suppository(s) Rectal two times a day  levothyroxine 137 MICROGram(s) Oral daily  mesalamine Suppository 1000 milliGRAM(s) Rectal at bedtime  metoprolol succinate  milliGRAM(s) Oral daily  midodrine. 10 milliGRAM(s) Oral three times a day  pantoprazole    Tablet 40 milliGRAM(s) Oral before breakfast  polyethylene glycol 3350 17 Gram(s) Oral daily  senna 2 Tablet(s) Oral at bedtime  simvastatin 40 milliGRAM(s) Oral at bedtime      TELEMETRY: 	    ECG:  	  RADIOLOGY:  OTHER: 	  	  LABS:	 	    CARDIAC MARKERS:                          9.8    6.56  )-----------( 134      ( 25 Aug 2023 04:42 )             30.9     08-25    139  |  103  |  47<H>  ----------------------------<  97  4.2   |  26  |  2.55<H>    Ca    9.9      25 Aug 2023 04:42  Phos  3.2     08-25  Mg     1.6     08-25      proBNP:   Lipid Profile:   HgA1c:   TSH:   PT/INR - ( 25 Aug 2023 04:42 )   PT: 11.6 sec;   INR: 1.11 ratio         PTT - ( 25 Aug 2023 04:42 )  PTT:52.6 sec  - NPO at midnight  - Preop labs: CBC, BMP, Mg, Phos, PT/PTT/INR, Type & cross still valid from 8/23/23.  - Cardiology clearance documented  - Pending Medicine clearance documentation   - Hold heparin ggt at 6 AM  - Patient had HD 8/23     Assessment and plan  ---------------------------  Liam Garza is 79 y.o. M with PMHx Significant for HLD, A-fib, BPH, ESRD, CANDY, who presents to the ED with c.o. abd pain, rectal pain, and bloody stools.  Per patient, he has been experiencing abd pain and rectal bleeding onset 1 day ago.  Patient notes pain is localized to the suprapubic area, radiates to the RLQ.  He notes pain is intermittent and has had minimal improvement with Tylenol.  Nothing seems to exacerbate his pain.  His only other associated symptom is chills.  Otherwise he denies fevers, N/V, HA, changes in his vision, sore throat, SOB, CP, diarrhea or constipation. Blood per rectum is accumulated in his depends. Of note, patient notes rectal pain began in May which she describes as sharp and has been constant since onset.  pt with hx of chronic a.fib, chf systolic EF 35 to 40% s/p ppm ,MICRA sec to ppm exrtacction sec to + BC.  sob sec acute on chronic chf systolc, fluid overload  renal eval possible HD today  ?rectal bleed hold AC, GI eval  ASHD / A. FIb continue beta blocker   repeat echo with hx of pericardial effusion  GI/ MED appreciated  pulmonary edema/ chf, repeat echo check MV, continue HD with increase fluid withdrawal as tolerated  advance diet as  tolerated  doing better, oob to chair/ physical therapy  blood cultures are still +, ID appreciated awaiting ECHO concern about bioprosthetic MV, may need ERENDIRA/ id noted  if no improvement in Ef pt will need AICD in 3 months as per primary prevention  vascular consulted for  av matthew/ fistulae, appreciated /vein mapping  pt is clear cardiac wise for the vascular procedure today   will dc ACE sec to low bp and normal EF on this echo  will switch heparin too NOAC post procedure for AC sec to A.Fib

## 2023-08-25 NOTE — PROCEDURE NOTE - ADDITIONAL PROCEDURE DETAILS
Indication: Pre procedure battery check.     Presenting rhythm: VS-VS irregular rates w/ intermittent VPacing.   Underlying rhythm: AF w/ V rates ~40-80s.   Pt is not PPM dependent.      12.5%    Estimated remaining battery longevity: > 8 years; 3.16V (RRT 2.56V)    - Emily Lopez PA-C

## 2023-08-26 LAB
ANION GAP SERPL CALC-SCNC: 11 MMOL/L — SIGNIFICANT CHANGE UP (ref 5–17)
BUN SERPL-MCNC: 49 MG/DL — HIGH (ref 7–23)
CALCIUM SERPL-MCNC: 10.4 MG/DL — SIGNIFICANT CHANGE UP (ref 8.4–10.5)
CHLORIDE SERPL-SCNC: 100 MMOL/L — SIGNIFICANT CHANGE UP (ref 96–108)
CO2 SERPL-SCNC: 27 MMOL/L — SIGNIFICANT CHANGE UP (ref 22–31)
CREAT SERPL-MCNC: 2.25 MG/DL — HIGH (ref 0.5–1.3)
EGFR: 29 ML/MIN/1.73M2 — LOW
GLUCOSE SERPL-MCNC: 71 MG/DL — SIGNIFICANT CHANGE UP (ref 70–99)
HAV IGM SER-ACNC: SIGNIFICANT CHANGE UP
HBV CORE IGM SER-ACNC: SIGNIFICANT CHANGE UP
HBV SURFACE AG SER-ACNC: SIGNIFICANT CHANGE UP
HCT VFR BLD CALC: 30.5 % — LOW (ref 39–50)
HCV AB S/CO SERPL IA: 0.17 S/CO — SIGNIFICANT CHANGE UP (ref 0–0.99)
HCV AB SERPL-IMP: SIGNIFICANT CHANGE UP
HGB BLD-MCNC: 9.8 G/DL — LOW (ref 13–17)
MCHC RBC-ENTMCNC: 29.3 PG — SIGNIFICANT CHANGE UP (ref 27–34)
MCHC RBC-ENTMCNC: 32.1 GM/DL — SIGNIFICANT CHANGE UP (ref 32–36)
MCV RBC AUTO: 91 FL — SIGNIFICANT CHANGE UP (ref 80–100)
NRBC # BLD: 0 /100 WBCS — SIGNIFICANT CHANGE UP (ref 0–0)
PLATELET # BLD AUTO: 128 K/UL — LOW (ref 150–400)
POTASSIUM SERPL-MCNC: 4.5 MMOL/L — SIGNIFICANT CHANGE UP (ref 3.5–5.3)
POTASSIUM SERPL-SCNC: 4.5 MMOL/L — SIGNIFICANT CHANGE UP (ref 3.5–5.3)
RBC # BLD: 3.35 M/UL — LOW (ref 4.2–5.8)
RBC # FLD: 16.5 % — HIGH (ref 10.3–14.5)
SODIUM SERPL-SCNC: 138 MMOL/L — SIGNIFICANT CHANGE UP (ref 135–145)
UFH PPP CHRO-ACNC: <0.04 IU/ML — LOW (ref 0.3–0.7)
WBC # BLD: 5.2 K/UL — SIGNIFICANT CHANGE UP (ref 3.8–10.5)
WBC # FLD AUTO: 5.2 K/UL — SIGNIFICANT CHANGE UP (ref 3.8–10.5)

## 2023-08-26 PROCEDURE — 99232 SBSQ HOSP IP/OBS MODERATE 35: CPT | Mod: GC

## 2023-08-26 RX ORDER — HEPARIN SODIUM 5000 [USP'U]/ML
1250 INJECTION INTRAVENOUS; SUBCUTANEOUS
Qty: 25000 | Refills: 0 | Status: DISCONTINUED | OUTPATIENT
Start: 2023-08-26 | End: 2023-08-26

## 2023-08-26 RX ORDER — APIXABAN 2.5 MG/1
5 TABLET, FILM COATED ORAL
Refills: 0 | Status: DISCONTINUED | OUTPATIENT
Start: 2023-08-26 | End: 2023-08-30

## 2023-08-26 RX ADMIN — Medication 137 MICROGRAM(S): at 05:10

## 2023-08-26 RX ADMIN — PANTOPRAZOLE SODIUM 40 MILLIGRAM(S): 20 TABLET, DELAYED RELEASE ORAL at 05:10

## 2023-08-26 RX ADMIN — ESCITALOPRAM OXALATE 5 MILLIGRAM(S): 10 TABLET, FILM COATED ORAL at 13:03

## 2023-08-26 RX ADMIN — APIXABAN 5 MILLIGRAM(S): 2.5 TABLET, FILM COATED ORAL at 18:13

## 2023-08-26 RX ADMIN — Medication 100 MILLIGRAM(S): at 05:10

## 2023-08-26 RX ADMIN — MIDODRINE HYDROCHLORIDE 10 MILLIGRAM(S): 2.5 TABLET ORAL at 18:11

## 2023-08-26 RX ADMIN — MIDODRINE HYDROCHLORIDE 10 MILLIGRAM(S): 2.5 TABLET ORAL at 13:04

## 2023-08-26 RX ADMIN — MIDODRINE HYDROCHLORIDE 10 MILLIGRAM(S): 2.5 TABLET ORAL at 05:10

## 2023-08-26 NOTE — PROGRESS NOTE ADULT - SUBJECTIVE AND OBJECTIVE BOX
Surgery Progress Note    SUBJECTIVE: Pt seen and examined at bedside. POD1  AVF creation, recovering well, pain controlled, no complaints.     Vital Signs Last 24 Hrs  T(C): 36.5 (26 Aug 2023 04:11), Max: 36.8 (25 Aug 2023 08:29)  T(F): 97.7 (26 Aug 2023 04:11), Max: 98.2 (25 Aug 2023 08:29)  HR: 61 (26 Aug 2023 04:11) (51 - 77)  BP: 126/61 (26 Aug 2023 04:11) (90/53 - 134/70)  BP(mean): 73 (25 Aug 2023 16:30) (67 - 79)  RR: 18 (26 Aug 2023 04:11) (15 - 19)  SpO2: 93% (26 Aug 2023 04:11) (92% - 100%)    Parameters below as of 26 Aug 2023 04:11  Patient On (Oxygen Delivery Method): room air        Physical Exam:  General Appearance: Appears well, NAD  Respiratory: No labored breathing  CV: Pulse regularly present  Abdomen: Soft, nontender  Vascular: AVF with thrill, dressing c/d/i, palp radial distally, no motor or sensory deficits     LABS:                        9.8    5.20  )-----------( 128      ( 26 Aug 2023 07:02 )             30.5     08-26    138  |  100  |  49<H>  ----------------------------<  71  4.5   |  27  |  2.25<H>    Ca    10.4      26 Aug 2023 06:58  Phos  3.2     08-25  Mg     1.6     08-25      PT/INR - ( 25 Aug 2023 04:42 )   PT: 11.6 sec;   INR: 1.11 ratio         PTT - ( 25 Aug 2023 04:42 )  PTT:52.6 sec  Urinalysis Basic - ( 26 Aug 2023 06:58 )    Color: x / Appearance: x / SG: x / pH: x  Gluc: 71 mg/dL / Ketone: x  / Bili: x / Urobili: x   Blood: x / Protein: x / Nitrite: x   Leuk Esterase: x / RBC: x / WBC x   Sq Epi: x / Non Sq Epi: x / Bacteria: x        INs and OUTs:    08-25-23 @ 07:01  -  08-26-23 @ 07:00  --------------------------------------------------------  IN: 100 mL / OUT: 215 mL / NET: -115 mL

## 2023-08-26 NOTE — PROGRESS NOTE ADULT - SUBJECTIVE AND OBJECTIVE BOX
[facility] DIVISION OF KIDNEY DISEASES AND HYPERTENSION   FOLLOW UP NOTE    --------------------------------------------------------------------------------  Chief Complaint:    24 hour events/subjective: Pt. was seen and examined today. Overnight events noted. Denies any shortness of breath, chest pain, nausea or vomiting, abd pain.        PAST HISTORY  --------------------------------------------------------------------------------  No significant changes to PMH, PSH, FHx, SHx, unless otherwise noted    ALLERGIES & MEDICATIONS  --------------------------------------------------------------------------------  Allergies    Myrbetriq (Hives)  tamsulosin (Hives)  alfuzosin (Hives)  levofloxacin (Hives)    Intolerances      Standing Inpatient Medications  apixaban 5 milliGRAM(s) Oral two times a day  epoetin val-epbx (RETACRIT) Injectable 4000 Unit(s) IV Push <User Schedule>  erythromycin   Ointment 1 Application(s) Left EYE three times a day  escitalopram 5 milliGRAM(s) Oral daily  hydrocortisone hemorrhoidal Suppository 1 Suppository(s) Rectal two times a day  levothyroxine 137 MICROGram(s) Oral daily  mesalamine Suppository 1000 milliGRAM(s) Rectal at bedtime  metoprolol succinate  milliGRAM(s) Oral daily  midodrine. 10 milliGRAM(s) Oral three times a day  pantoprazole    Tablet 40 milliGRAM(s) Oral before breakfast  polyethylene glycol 3350 17 Gram(s) Oral daily  senna 2 Tablet(s) Oral at bedtime  simvastatin 40 milliGRAM(s) Oral at bedtime    PRN Inpatient Medications  aluminum hydroxide/magnesium hydroxide/simethicone Suspension 30 milliLiter(s) Oral every 6 hours PRN  artificial  tears Solution 1 Drop(s) Both EYES four times a day PRN  midodrine. 20 milliGRAM(s) Oral <User Schedule> PRN      REVIEW OF SYSTEMS  --------------------------------------------------------------------------------  Gen: No fevers/chills  Head/Eyes/Ears: No HA   Respiratory: No dyspnea, cough  CV: No chest pain  GI: No abdominal pain, diarrhea  : No dysuria, hematuria  MSK: No  edema  Skin: No rashes  Heme: No easy bruising or bleeding    All other systems were reviewed and are negative, except as noted.    VITALS/PHYSICAL EXAM  --------------------------------------------------------------------------------  T(C): 36.5 (08-26-23 @ 12:15), Max: 36.6 (08-25-23 @ 18:20)  HR: 55 (08-26-23 @ 12:15) (51 - 77)  BP: 108/65 (08-26-23 @ 12:15) (90/53 - 129/62)  RR: 18 (08-26-23 @ 12:15) (15 - 19)  SpO2: 94% (08-26-23 @ 12:15) (93% - 100%)  Wt(kg): --  Height (cm): 170.2 (08-25-23 @ 13:06)  Weight (kg): 81.6 (08-25-23 @ 13:06)  BMI (kg/m2): 28.2 (08-25-23 @ 13:06)  BSA (m2): 1.93 (08-25-23 @ 13:06)      08-25-23 @ 07:01  -  08-26-23 @ 07:00  --------------------------------------------------------  IN: 100 mL / OUT: 215 mL / NET: -115 mL        Physical Exam:  	Gen: NAD  	HEENT: Anicteric  	Pulm: CTA B/L  	CV: S1S2+  	Abd: Soft, +BS, NT, ND  	Ext: No LE edema B/L  	Neuro: Awake          : Knapp cath+ with clear urine  	Skin: Warm and dry  	Dialysis access: Temporay HD catheter      LABS/STUDIES  --------------------------------------------------------------------------------              9.8    5.20  >-----------<  128      [08-26-23 @ 07:02]              30.5     138  |  100  |  49  ----------------------------<  71      [08-26-23 @ 06:58]  4.5   |  27  |  2.25        Ca     10.4     [08-26-23 @ 06:58]      Mg     1.6     [08-25-23 @ 04:42]      Phos  3.2     [08-25-23 @ 04:42]      PT/INR: PT 11.6 , INR 1.11       [08-25-23 @ 04:42]  PTT: 52.6       [08-25-23 @ 04:42]      Creatinine Trend:  SCr 2.25 [08-26 @ 06:58]  SCr 2.55 [08-25 @ 04:42]  SCr 2.21 [08-23 @ 04:31]  SCr 2.00 [08-22 @ 04:31]  SCr 2.87 [08-21 @ 04:11]    Urinalysis - [08-26-23 @ 06:58]      Color  / Appearance  / SG  / pH       Gluc 71 / Ketone   / Bili  / Urobili        Blood  / Protein  / Leuk Est  / Nitrite       RBC  / WBC  / Hyaline  / Gran  / Sq Epi  / Non Sq Epi  / Bacteria       HBsAb <3.0      [08-11-23 @ 21:13]  HBsAg Nonreact      [08-11-23 @ 21:13]  HBcAb Nonreact      [08-11-23 @ 21:13]      Tacrolimus  Cyclosporine  Sirolimus  Mycophenolate  BK PCR  CMV PCR  Parvo PCR  EBV PCR [facility] DIVISION OF KIDNEY DISEASES AND HYPERTENSION   FOLLOW UP NOTE    --------------------------------------------------------------------------------  Chief Complaint:    24 hour events/subjective: Pt. was seen and examined today. Overnight events noted. Denies any shortness of breath, chest pain, nausea or vomiting, abd pain.        PAST HISTORY  --------------------------------------------------------------------------------  No significant changes to PMH, PSH, FHx, SHx, unless otherwise noted    ALLERGIES & MEDICATIONS  --------------------------------------------------------------------------------  Allergies    Myrbetriq (Hives)  tamsulosin (Hives)  alfuzosin (Hives)  levofloxacin (Hives)    Intolerances      Standing Inpatient Medications  apixaban 5 milliGRAM(s) Oral two times a day  epoetin val-epbx (RETACRIT) Injectable 4000 Unit(s) IV Push <User Schedule>  erythromycin   Ointment 1 Application(s) Left EYE three times a day  escitalopram 5 milliGRAM(s) Oral daily  hydrocortisone hemorrhoidal Suppository 1 Suppository(s) Rectal two times a day  levothyroxine 137 MICROGram(s) Oral daily  mesalamine Suppository 1000 milliGRAM(s) Rectal at bedtime  metoprolol succinate  milliGRAM(s) Oral daily  midodrine. 10 milliGRAM(s) Oral three times a day  pantoprazole    Tablet 40 milliGRAM(s) Oral before breakfast  polyethylene glycol 3350 17 Gram(s) Oral daily  senna 2 Tablet(s) Oral at bedtime  simvastatin 40 milliGRAM(s) Oral at bedtime    PRN Inpatient Medications  aluminum hydroxide/magnesium hydroxide/simethicone Suspension 30 milliLiter(s) Oral every 6 hours PRN  artificial  tears Solution 1 Drop(s) Both EYES four times a day PRN  midodrine. 20 milliGRAM(s) Oral <User Schedule> PRN      REVIEW OF SYSTEMS  --------------------------------------------------------------------------------  Gen: No fevers/chills  Head/Eyes/Ears: No HA   Respiratory: No dyspnea, cough  CV: No chest pain  GI: No abdominal pain, diarrhea  : No dysuria, hematuria  MSK: No  edema  Skin: No rashes  Heme: No easy bruising or bleeding    All other systems were reviewed and are negative, except as noted.    VITALS/PHYSICAL EXAM  --------------------------------------------------------------------------------  T(C): 36.5 (08-26-23 @ 12:15), Max: 36.6 (08-25-23 @ 18:20)  HR: 55 (08-26-23 @ 12:15) (51 - 77)  BP: 108/65 (08-26-23 @ 12:15) (90/53 - 129/62)  RR: 18 (08-26-23 @ 12:15) (15 - 19)  SpO2: 94% (08-26-23 @ 12:15) (93% - 100%)  Wt(kg): --  Height (cm): 170.2 (08-25-23 @ 13:06)  Weight (kg): 81.6 (08-25-23 @ 13:06)  BMI (kg/m2): 28.2 (08-25-23 @ 13:06)  BSA (m2): 1.93 (08-25-23 @ 13:06)      08-25-23 @ 07:01  -  08-26-23 @ 07:00  --------------------------------------------------------  IN: 100 mL / OUT: 215 mL / NET: -115 mL        Physical Exam:  	Gen: NAD, atraumatic.   	HEENT: Anicteric, normal oral mucosa.   	Pulm: CTA B/L, no crackles  	CV: S1S2+, no m/g/r  	Abd: Soft, +BS, NT, ND  	Ext: No LE edema B/L  	Neuro: Awake, moving all 4 limbs.   pSYCH: normal mood and affect          : Knapp cath+ with clear urine  	Skin: Warm and dry  	Dialysis access: dialysis HD catheter, left forearm AVF      LABS/STUDIES  --------------------------------------------------------------------------------              9.8    5.20  >-----------<  128      [08-26-23 @ 07:02]              30.5     138  |  100  |  49  ----------------------------<  71      [08-26-23 @ 06:58]  4.5   |  27  |  2.25        Ca     10.4     [08-26-23 @ 06:58]      Mg     1.6     [08-25-23 @ 04:42]      Phos  3.2     [08-25-23 @ 04:42]      PT/INR: PT 11.6 , INR 1.11       [08-25-23 @ 04:42]  PTT: 52.6       [08-25-23 @ 04:42]      Creatinine Trend:  SCr 2.25 [08-26 @ 06:58]  SCr 2.55 [08-25 @ 04:42]  SCr 2.21 [08-23 @ 04:31]  SCr 2.00 [08-22 @ 04:31]  SCr 2.87 [08-21 @ 04:11]    Urinalysis - [08-26-23 @ 06:58]      Color  / Appearance  / SG  / pH       Gluc 71 / Ketone   / Bili  / Urobili        Blood  / Protein  / Leuk Est  / Nitrite       RBC  / WBC  / Hyaline  / Gran  / Sq Epi  / Non Sq Epi  / Bacteria       HBsAb <3.0      [08-11-23 @ 21:13]  HBsAg Nonreact      [08-11-23 @ 21:13]  HBcAb Nonreact      [08-11-23 @ 21:13]      Tacrolimus  Cyclosporine  Sirolimus  Mycophenolate  BK PCR  CMV PCR  Parvo PCR  EBV PCR

## 2023-08-26 NOTE — PROGRESS NOTE ADULT - ASSESSMENT
78  year old male    h/o  CAD s/p stent , asa.     A-fib/ PPM, , hypothyroid, and total left knee replacement    prior ppm  interrogation  with  WCT   Ct chest, retrosternal hematoma.  mod left pl effusion/ has   c/c leg  pain/  h/o  non complaince  with meds  in the  past      admitted  with  abd pain and rectal bleeding onset 1 day ago.. arrive s form n home     has  no pain  now    c/c   systolic  and  diastolic  chf,,  cardiomyopathy,. Mitral  valvuloplasty ,  h/o  c/c  l/edema  of L  leg    h/o    c/c AFIB,   has  PPM     h/o c/c   Anemia, , hb is  12.  gi   known to  dr joann INFANTE,     cath, with 2 vessel disease,  s/p  CABG and  MVR  surg d r marni      echo,  on 7/2022,  ef  35/ new/ severe  TR,  and ,  cath,   was non  obstructive     s/p   MSSA  bacterinia , in march 2023, completed  iv ancef..    s/p   explant pf  ppm  and  Micra  placement on  3/30/23. dr sunitha rocha      Echo,  3/2023,  ef  25,  mod  AI. severe  TR . RV hypokinesis    BEULAH, from  AIN, dx  recently , ancef / was  switched   to iv vanco,   CKD, on HD,  has  right Permcath .house  renal   s/p  renal bx on 4/ 14.. 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 + , Ig A  Lamda. ,  known to heme  dr dobson.  pe r heme,  no  need  for  b marrow  bx    cxr/  CT chest,  pl  effusion,   gi    f/p,  no  bleeding /and   nuclear  scan, no  bleeding   bacteremia   Clostridium cadeveris,  iv   ertapenem / completed       AVF,  of  left arm,   by vascular/     may  restart  a/c,  when ok  with vaccular.  dana mckeon on eliquis         card /  dr mikayla dick       < from: Intra-Operative Transesophageal Echo W or WO Ultrasound Enhancing Agent (03.30.23 @ 12:48) >  --------------------------------------------------------------------------------  PRE-BYPASS FINDINGS  Left Ventricle:  Left ventricular ejection fraction is estimated at 25 to 30%. There is severe kypokinesis in the left ventricle.  Right Ventricle:  Moderately reduced systolic function. Moderately reduced systolic function. There is moderate hypokinesis in the right ventricle.  Left Atrium:  Diffuse smoke visualized in LA.  Aortic Valve:  The aortic valve appears trileaflet. There is moderate aortic regurgitation.  No obvious vegetations visualized  Mitral Valve:  Bioprosthetic valve is present in the mitral position. There is calcification of the mitral valve annulus.  No obvious vegetations visualized.  Tricuspid Valve:  There is moderate-severe tricuspid regurgitation. TR unchanged following lead extraction.  Pulmonic Valve:  There is mild pulmonic regurgitation.  Pericardium:  No pericardial effusion seen. No pericardial effusion visualized before and after lead extraction.  Electronically signed by Jimmy Gambino on 3/30/2023 at 3:00:58 PM  *** Final ***  < end of copied text >                                  78  year old male    h/o  CAD s/p stent , asa.     A-fib/ PPM, , hypothyroid, and total left knee replacement    prior ppm  interrogation  with  WCT   Ct chest, retrosternal hematoma.  mod left pl effusion/ has   c/c leg  pain/  h/o  non complaince  with meds  in the  past      admitted  with  abd pain and rectal bleeding onset 1 day ago.. arrive s form n home     has  no pain  now    c/c   systolic  and  diastolic  chf,,  cardiomyopathy,. Mitral  valvuloplasty ,  h/o  c/c  l/edema  of L  leg    h/o    c/c AFIB,   has  PPM     h/o c/c   Anemia, , hb is  12.  gi   known to  dr joann INFANTE,     cath, with 2 vessel disease,  s/p  CABG and  MVR  surg d r marni      echo,  on 7/2022,  ef  35/ new/ severe  TR,  and ,  cath,   was non  obstructive     s/p   MSSA  bacterinia , in march 2023, completed  iv ancef..    s/p   explant pf  ppm  and  Micra  placement on  3/30/23. dr sunitha rocha      Echo,  3/2023,  ef  25,  mod  AI. severe  TR . RV hypokinesis    BEULAH, from  AIN, dx  recently , ancef / was  switched   to iv vanco,   CKD, on HD,  has  right Permcath .house  renal   s/p  renal bx on 4/ 14.. 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 + , Ig A  Lamda. ,  known to heme  dr dobson.  pe r heme,  no  need  for  b marrow  bx    cxr/  CT chest,  pl  effusion,   gi    f/p,  no  bleeding /and   nuclear  scan, no  bleeding   bacteremia   Clostridium cadeveris,  iv   ertapenem / completed       AVF,  of  left arm,   by vascular/     may  restart  a/c,  when ok  with vascular   was  on eliquis         card /  dr mikayla dick       < from: Intra-Operative Transesophageal Echo W or WO Ultrasound Enhancing Agent (03.30.23 @ 12:48) >  --------------------------------------------------------------------------------  PRE-BYPASS FINDINGS  Left Ventricle:  Left ventricular ejection fraction is estimated at 25 to 30%. There is severe kypokinesis in the left ventricle.  Right Ventricle:  Moderately reduced systolic function. Moderately reduced systolic function. There is moderate hypokinesis in the right ventricle.  Left Atrium:  Diffuse smoke visualized in LA.  Aortic Valve:  The aortic valve appears trileaflet. There is moderate aortic regurgitation.  No obvious vegetations visualized  Mitral Valve:  Bioprosthetic valve is present in the mitral position. There is calcification of the mitral valve annulus.  No obvious vegetations visualized.  Tricuspid Valve:  There is moderate-severe tricuspid regurgitation. TR unchanged following lead extraction.  Pulmonic Valve:  There is mild pulmonic regurgitation.  Pericardium:  No pericardial effusion seen. No pericardial effusion visualized before and after lead extraction.  Electronically signed by Jimmy Gambino on 3/30/2023 at 3:00:58 PM  *** Final ***  < end of copied text >                                  No

## 2023-08-26 NOTE — PROGRESS NOTE ADULT - ASSESSMENT
POD 1 LUE AVF. Recovering appropriately.     Recs:  - Outpatient follow up with Dr. Jules  - Remainder of care per primary team  - Dressing off tomorrow, steri strips remain in place  - Call with any concerns    Vascular Surgery   [id number]

## 2023-08-26 NOTE — PROGRESS NOTE ADULT - ATTENDING COMMENTS
# ESRD - HD MWF. We held dialysis on 8/25/2023 because patient went for AVF surgery.   For HD today as planned. Next session will be on 8/28/2023.     # Medication monitoring encounter: medication dose reviewed. Please dose medications to CrCl<15    The rest of the recommendations as per fellow's note.    Xochitl Penny MD  Attending Nephrologist  [phone] or via [employer]

## 2023-08-26 NOTE — PROGRESS NOTE ADULT - SUBJECTIVE AND OBJECTIVE BOX
Date of Service: 08-26-23 @ 09:54           CARDIOLOGY     PROGRESS  NOTE   ________________________________________________    CHIEF COMPLAINT:Patient is a 78y old  Male who presents with a chief complaint of abdominal pain/ rectal bleed (26 Aug 2023 08:24)  doing well  	  REVIEW OF SYSTEMS:  CONSTITUTIONAL: No fever, weight loss, or fatigue  EYES: No eye pain, visual disturbances, or discharge  ENT:  No difficulty hearing, tinnitus, vertigo; No sinus or throat pain  NECK: No pain or stiffness  RESPIRATORY: No cough, wheezing, chills or hemoptysis; No Shortness of Breath  CARDIOVASCULAR: No chest pain, palpitations, passing out, dizziness, or leg swelling  GASTROINTESTINAL: No abdominal or epigastric pain. No nausea, vomiting, or hematemesis; No diarrhea or constipation. No melena or hematochezia.  GENITOURINARY: No dysuria, frequency, hematuria, or incontinence  NEUROLOGICAL: No headaches, memory loss, loss of strength, numbness, or tremors  SKIN: No itching, burning, rashes, or lesions   LYMPH Nodes: No enlarged glands  ENDOCRINE: No heat or cold intolerance; No hair loss  MUSCULOSKELETAL: No joint pain or swelling; No muscle, back, or extremity pain  PSYCHIATRIC: No depression, anxiety, mood swings, or difficulty sleeping  HEME/LYMPH: No easy bruising, or bleeding gums  ALLERGY AND IMMUNOLOGIC: No hives or eczema	    [ x] All others negative	  [ ] Unable to obtain    PHYSICAL EXAM:  T(C): 36.4 (08-26-23 @ 09:22), Max: 36.6 (08-25-23 @ 18:20)  HR: 66 (08-26-23 @ 09:22) (51 - 77)  BP: 122/61 (08-26-23 @ 09:22) (90/53 - 129/62)  RR: 18 (08-26-23 @ 09:22) (15 - 19)  SpO2: 94% (08-26-23 @ 09:22) (93% - 100%)  Wt(kg): --  I&O's Summary    25 Aug 2023 07:01  -  26 Aug 2023 07:00  --------------------------------------------------------  IN: 100 mL / OUT: 215 mL / NET: -115 mL        Appearance: Normal	  HEENT:   Normal oral mucosa, PERRL, EOMI	  Lymphatic: No lymphadenopathy  Cardiovascular: Normal S1 S2, No JVD,+murmurs, No edema  Respiratory: rhonchi  Psychiatry: A & O x 3, Mood & affect appropriate  Gastrointestinal:  Soft, Non-tender, + BS	  Skin: No rashes, No ecchymoses, No cyanosis	  Neurologic: Non-focal  Extremities: Normal range of motion, No clubbing, cyanosis or edema  Vascular:+ pvd    MEDICATIONS  (STANDING):  epoetin val-epbx (RETACRIT) Injectable 4000 Unit(s) IV Push <User Schedule>  erythromycin   Ointment 1 Application(s) Left EYE three times a day  escitalopram 5 milliGRAM(s) Oral daily  heparin  Infusion 1250 Unit(s)/Hr (12.5 mL/Hr) IV Continuous <Continuous>  hydrocortisone hemorrhoidal Suppository 1 Suppository(s) Rectal two times a day  levothyroxine 137 MICROGram(s) Oral daily  mesalamine Suppository 1000 milliGRAM(s) Rectal at bedtime  metoprolol succinate  milliGRAM(s) Oral daily  midodrine. 10 milliGRAM(s) Oral three times a day  pantoprazole    Tablet 40 milliGRAM(s) Oral before breakfast  polyethylene glycol 3350 17 Gram(s) Oral daily  senna 2 Tablet(s) Oral at bedtime  simvastatin 40 milliGRAM(s) Oral at bedtime      TELEMETRY: 	    ECG:  	  RADIOLOGY:  OTHER: 	  	  LABS:	 	    CARDIAC MARKERS:                                9.8    5.20  )-----------( 128      ( 26 Aug 2023 07:02 )             30.5     08-26    138  |  100  |  49<H>  ----------------------------<  71  4.5   |  27  |  2.25<H>    Ca    10.4      26 Aug 2023 06:58  Phos  3.2     08-25  Mg     1.6     08-25      proBNP:   Lipid Profile:   HgA1c:   TSH:   PT/INR - ( 25 Aug 2023 04:42 )   PT: 11.6 sec;   INR: 1.11 ratio         PTT - ( 25 Aug 2023 04:42 )  PTT:52.6 sec    - Outpatient follow up with Dr. Jules  - Remainder of care per primary team  - Dressing off tomorrow, steri strips remain in place  - Call with any concerns    Assessment and plan  ---------------------------  Liam Garza is 79 y.o. M with PMHx Significant for HLD, A-fib, BPH, ESRD, CANDY, who presents to the ED with c.o. abd pain, rectal pain, and bloody stools.  Per patient, he has been experiencing abd pain and rectal bleeding onset 1 day ago.  Patient notes pain is localized to the suprapubic area, radiates to the RLQ.  He notes pain is intermittent and has had minimal improvement with Tylenol.  Nothing seems to exacerbate his pain.  His only other associated symptom is chills.  Otherwise he denies fevers, N/V, HA, changes in his vision, sore throat, SOB, CP, diarrhea or constipation. Blood per rectum is accumulated in his depends. Of note, patient notes rectal pain began in May which she describes as sharp and has been constant since onset.  pt with hx of chronic a.fib, chf systolic EF 35 to 40% s/p ppm ,MICRA sec to ppm exrtacction sec to + BC.  sob sec acute on chronic chf systolc, fluid overload  renal eval possible HD today  ?rectal bleed hold AC, GI eval  ASHD / A. FIb continue beta blocker   repeat echo with hx of pericardial effusion  GI/ MED appreciated  pulmonary edema/ chf, repeat echo check MV, continue HD with increase fluid withdrawal as tolerated  advance diet as  tolerated  doing better, oob to chair/ physical therapy  blood cultures are still +, ID appreciated awaiting ECHO concern about bioprosthetic MV, may need ERENDIRA/ id noted  if no improvement in Ef pt will need AICD in 3 months as per primary prevention  vascular consulted for  av matthew/ fistulae, appreciated /vein mapping  pt is clear cardiac wise for the vascular procedure today   will dc ACE sec to low bp and normal EF on this echo  will switch heparin too NOAC post procedure for AC sec to A.Fib  vascular appreciated, s/p dialysis catheter  dc planning

## 2023-08-26 NOTE — PROGRESS NOTE ADULT - SUBJECTIVE AND OBJECTIVE BOX
date of service: 08-26-23 @ 06:40  afberile  REVIEW OF SYSTEMS:  CONSTITUTIONAL: No fever,  no  weight loss  ENT:  No  tinnitus,   no   vertigo  NECK: No pain or stiffness  RESPIRATORY: No cough, wheezing, chills or hemoptysis;    No Shortness of Breath  CARDIOVASCULAR: No chest pain, palpitations, dizziness  GASTROINTESTINAL: No abdominal or epigastric pain. No nausea, vomiting, or hematemesis; No diarrhea  No melena or hematochezia.  GENITOURINARY: No dysuria, frequency, hematuria, or incontinence  NEUROLOGICAL: No headaches  SKIN: No itching,  no   rash  LYMPH Nodes: No enlarged glands  ENDOCRINE: No heat or cold intolerance  MUSCULOSKELETAL: No joint pain or swelling  PSYCHIATRIC: No depression, anxiety  HEME/LYMPH: No easy bruising, or bleeding gums  ALLERGY AND IMMUNOLOGIC: No hives or eczema	    MEDICATIONS  (STANDING):  epoetin val-epbx (RETACRIT) Injectable 4000 Unit(s) IV Push <User Schedule>  erythromycin   Ointment 1 Application(s) Left EYE three times a day  escitalopram 5 milliGRAM(s) Oral daily  hydrocortisone hemorrhoidal Suppository 1 Suppository(s) Rectal two times a day  levothyroxine 137 MICROGram(s) Oral daily  mesalamine Suppository 1000 milliGRAM(s) Rectal at bedtime  metoprolol succinate  milliGRAM(s) Oral daily  midodrine. 10 milliGRAM(s) Oral three times a day  pantoprazole    Tablet 40 milliGRAM(s) Oral before breakfast  polyethylene glycol 3350 17 Gram(s) Oral daily  senna 2 Tablet(s) Oral at bedtime  simvastatin 40 milliGRAM(s) Oral at bedtime    MEDICATIONS  (PRN):  aluminum hydroxide/magnesium hydroxide/simethicone Suspension 30 milliLiter(s) Oral every 6 hours PRN Dyspepsia  artificial  tears Solution 1 Drop(s) Both EYES four times a day PRN Dry Eyes  midodrine. 20 milliGRAM(s) Oral <User Schedule> PRN PRIOR TO HD      Vital Signs Last 24 Hrs  T(C): 36.5 (26 Aug 2023 04:11), Max: 36.8 (25 Aug 2023 08:29)  T(F): 97.7 (26 Aug 2023 04:11), Max: 98.2 (25 Aug 2023 08:29)  HR: 61 (26 Aug 2023 04:11) (51 - 77)  BP: 126/61 (26 Aug 2023 04:11) (90/53 - 134/70)  BP(mean): 73 (25 Aug 2023 16:30) (67 - 79)  RR: 18 (26 Aug 2023 04:11) (15 - 19)  SpO2: 93% (26 Aug 2023 04:11) (92% - 100%)    Parameters below as of 26 Aug 2023 04:11  Patient On (Oxygen Delivery Method): room air      CAPILLARY BLOOD GLUCOSE        I&O's Summary    24 Aug 2023 07:01  -  25 Aug 2023 07:00  --------------------------------------------------------  IN: 0 mL / OUT: 400 mL / NET: -400 mL    25 Aug 2023 07:01  -  26 Aug 2023 06:40  --------------------------------------------------------  IN: 100 mL / OUT: 215 mL / NET: -115 mL          Appearance: Normal	  HEENT:   Normal oral mucosa, PERRL, EOMI	  Lymphatic: No lymphadenopathy  Cardiovascular: Normal S1 S2, No JVD  Respiratory: Lungs clear to auscultation	  Gastrointestinal:  Soft, Non-tender, + BS	  Skin: No rash, No ecchymoses	  Extremities:     LABS:                        9.8    6.56  )-----------( 134      ( 25 Aug 2023 04:42 )             30.9     08-25    139  |  103  |  47<H>  ----------------------------<  97  4.2   |  26  |  2.55<H>    Ca    9.9      25 Aug 2023 04:42  Phos  3.2     08-25  Mg     1.6     08-25      PT/INR - ( 25 Aug 2023 04:42 )   PT: 11.6 sec;   INR: 1.11 ratio         PTT - ( 25 Aug 2023 04:42 )  PTT:52.6 sec      Urinalysis Basic - ( 25 Aug 2023 04:42 )    Color: x / Appearance: x / SG: x / pH: x  Gluc: 97 mg/dL / Ketone: x  / Bili: x / Urobili: x   Blood: x / Protein: x / Nitrite: x   Leuk Esterase: x / RBC: x / WBC x   Sq Epi: x / Non Sq Epi: x / Bacteria: x                      Consultant(s) Notes Reviewed:      Care Discussed with Consultants/Other Providers:

## 2023-08-27 RX ADMIN — ESCITALOPRAM OXALATE 5 MILLIGRAM(S): 10 TABLET, FILM COATED ORAL at 12:30

## 2023-08-27 RX ADMIN — SIMVASTATIN 40 MILLIGRAM(S): 20 TABLET, FILM COATED ORAL at 00:40

## 2023-08-27 RX ADMIN — Medication 100 MILLIGRAM(S): at 05:21

## 2023-08-27 RX ADMIN — MIDODRINE HYDROCHLORIDE 10 MILLIGRAM(S): 2.5 TABLET ORAL at 05:21

## 2023-08-27 RX ADMIN — SIMVASTATIN 40 MILLIGRAM(S): 20 TABLET, FILM COATED ORAL at 22:15

## 2023-08-27 RX ADMIN — Medication 1 SUPPOSITORY(S): at 17:19

## 2023-08-27 RX ADMIN — POLYETHYLENE GLYCOL 3350 17 GRAM(S): 17 POWDER, FOR SOLUTION ORAL at 12:30

## 2023-08-27 RX ADMIN — MIDODRINE HYDROCHLORIDE 10 MILLIGRAM(S): 2.5 TABLET ORAL at 12:30

## 2023-08-27 RX ADMIN — PANTOPRAZOLE SODIUM 40 MILLIGRAM(S): 20 TABLET, DELAYED RELEASE ORAL at 05:20

## 2023-08-27 RX ADMIN — APIXABAN 5 MILLIGRAM(S): 2.5 TABLET, FILM COATED ORAL at 17:19

## 2023-08-27 RX ADMIN — APIXABAN 5 MILLIGRAM(S): 2.5 TABLET, FILM COATED ORAL at 05:21

## 2023-08-27 RX ADMIN — Medication 137 MICROGRAM(S): at 05:20

## 2023-08-27 RX ADMIN — MIDODRINE HYDROCHLORIDE 10 MILLIGRAM(S): 2.5 TABLET ORAL at 17:18

## 2023-08-27 NOTE — PROGRESS NOTE ADULT - ASSESSMENT
78  year old male    h/o  CAD s/p stent , asa.     A-fib/ PPM, , hypothyroid, and total left knee replacement    prior ppm  interrogation  with  WCT   Ct chest, retrosternal hematoma.  mod left pl effusion/ has   c/c leg  pain      admitted  with  abd pain and rectal bleeding onset 1 day ago.. arrived form n home     has  no pain  now    c/c   systolic  and  diastolic  chf,,      cardiomyopathy, Mitral  valvuloplasty,   h/o  c/c  l/edema  of L  leg    h/o    c/c AFIB,   has  PPM     h/o c/c   Anemia, , hb is  12.  gi   known to  dr joann INFANTE,     cath, with 2 vessel disease,  s/p  CABG and  MVR  surg d r marni      echo,  on 7/2022,  ef  35/ new/ severe  TR,  and ,  cath,   was non  obstructive     s/p   MSSA  bacterinia , in march 2023, completed  iv ancef..    s/p   explant pf  ppm  and  Micra  placement on  3/30/23. dr sunitha rocha      Echo,  3/2023,  ef  25,  mod  AI. severe  TR . RV hypokinesis    BEULAH, from  AIN, dx  recently , ancef / was  switched   to iv vanco,   CKD, on HD,  has  right Permcath .house  renal   s/p  renal bx on 4/ 14.. 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 + , Ig A  Lamda., known to heme  dr dobson.  pe r heme,  no  need  for  b marrow  bx     CT chest,  pl  effusion,   gi    f/p,  no  bleeding /and   nuclear  scan, no  bleeding   bacteremia   Clostridium cadeveris,  iv   ertapenem, / completed       AVF of  left arm     on eliquis/  doing  well  plan/  start   d/c  planning/  pe r pt  wants  Megan sánchez /  dr mikayla dick       < from: Intra-Operative Transesophageal Echo W or WO Ultrasound Enhancing Agent (03.30.23 @ 12:48) >  --------------------------------------------------------------------------------  PRE-BYPASS FINDINGS  Left Ventricle:  Left ventricular ejection fraction is estimated at 25 to 30%. There is severe kypokinesis in the left ventricle.  Right Ventricle:  Moderately reduced systolic function. Moderately reduced systolic function. There is moderate hypokinesis in the right ventricle.  Left Atrium:  Diffuse smoke visualized in LA.  Aortic Valve:  The aortic valve appears trileaflet. There is moderate aortic regurgitation.  No obvious vegetations visualized  Mitral Valve:  Bioprosthetic valve is present in the mitral position. There is calcification of the mitral valve annulus.  No obvious vegetations visualized.  Tricuspid Valve:  There is moderate-severe tricuspid regurgitation. TR unchanged following lead extraction.  Pulmonic Valve:  There is mild pulmonic regurgitation.  Pericardium:  No pericardial effusion seen. No pericardial effusion visualized before and after lead extraction.  Electronically signed by Jimmy Gambino on 3/30/2023 at 3:00:58 PM  *** Final ***  < end of copied text >

## 2023-08-27 NOTE — PROGRESS NOTE ADULT - SUBJECTIVE AND OBJECTIVE BOX
date of service: 08-27-23 @ 10:59  afberile  REVIEW OF SYSTEMS:  CONSTITUTIONAL: No fever,  no  weight loss  ENT:  No  tinnitus,   no   vertigo  NECK: No pain or stiffness  RESPIRATORY: No cough, wheezing, chills or hemoptysis;    No Shortness of Breath  CARDIOVASCULAR: No chest pain, palpitations, dizziness  GASTROINTESTINAL: No abdominal or epigastric pain. No nausea, vomiting, or hematemesis; No diarrhea  No melena or hematochezia.  GENITOURINARY: No dysuria, frequency, hematuria, or incontinence  NEUROLOGICAL: No headaches  SKIN: No itching,  no   rash  LYMPH Nodes: No enlarged glands  ENDOCRINE: No heat or cold intolerance  MUSCULOSKELETAL: No joint pain or swelling  PSYCHIATRIC: No depression, anxiety  HEME/LYMPH: No easy bruising, or bleeding gums  ALLERGY AND IMMUNOLOGIC: No hives or eczema	    MEDICATIONS  (STANDING):  apixaban 5 milliGRAM(s) Oral two times a day  epoetin val-epbx (RETACRIT) Injectable 4000 Unit(s) IV Push <User Schedule>  erythromycin   Ointment 1 Application(s) Left EYE three times a day  escitalopram 5 milliGRAM(s) Oral daily  hydrocortisone hemorrhoidal Suppository 1 Suppository(s) Rectal two times a day  levothyroxine 137 MICROGram(s) Oral daily  mesalamine Suppository 1000 milliGRAM(s) Rectal at bedtime  metoprolol succinate  milliGRAM(s) Oral daily  midodrine. 10 milliGRAM(s) Oral three times a day  pantoprazole    Tablet 40 milliGRAM(s) Oral before breakfast  polyethylene glycol 3350 17 Gram(s) Oral daily  senna 2 Tablet(s) Oral at bedtime  simvastatin 40 milliGRAM(s) Oral at bedtime    MEDICATIONS  (PRN):  aluminum hydroxide/magnesium hydroxide/simethicone Suspension 30 milliLiter(s) Oral every 6 hours PRN Dyspepsia  artificial  tears Solution 1 Drop(s) Both EYES four times a day PRN Dry Eyes  midodrine. 20 milliGRAM(s) Oral <User Schedule> PRN PRIOR TO HD      Vital Signs Last 24 Hrs  T(C): 36.6 (27 Aug 2023 04:48), Max: 36.6 (27 Aug 2023 04:48)  T(F): 97.9 (27 Aug 2023 04:48), Max: 97.9 (27 Aug 2023 04:48)  HR: 73 (27 Aug 2023 04:48) (55 - 73)  BP: 120/67 (27 Aug 2023 04:48) (105/63 - 131/79)  BP(mean): --  RR: 18 (27 Aug 2023 04:48) (18 - 19)  SpO2: 93% (27 Aug 2023 04:48) (92% - 94%)    Parameters below as of 27 Aug 2023 04:48  Patient On (Oxygen Delivery Method): room air      CAPILLARY BLOOD GLUCOSE        I&O's Summary    26 Aug 2023 07:01  -  27 Aug 2023 07:00  --------------------------------------------------------  IN: 880 mL / OUT: 2800 mL / NET: -1920 mL          Appearance: Normal	  HEENT:   Normal oral mucosa, PERRL, EOMI	  Lymphatic: No lymphadenopathy  Cardiovascular: Normal S1 S2, No JVD  Respiratory: Lungs clear to auscultation	  Gastrointestinal:  Soft, Non-tender, + BS	  Skin: No rash, No ecchymoses	  Extremities:     LABS:                        9.8    5.20  )-----------( 128      ( 26 Aug 2023 07:02 )             30.5     08-26    138  |  100  |  49<H>  ----------------------------<  71  4.5   |  27  |  2.25<H>    Ca    10.4      26 Aug 2023 06:58            Urinalysis Basic - ( 26 Aug 2023 06:58 )    Color: x / Appearance: x / SG: x / pH: x  Gluc: 71 mg/dL / Ketone: x  / Bili: x / Urobili: x   Blood: x / Protein: x / Nitrite: x   Leuk Esterase: x / RBC: x / WBC x   Sq Epi: x / Non Sq Epi: x / Bacteria: x                      Consultant(s) Notes Reviewed:      Care Discussed with Consultants/Other Providers:

## 2023-08-27 NOTE — PROGRESS NOTE ADULT - SUBJECTIVE AND OBJECTIVE BOX
Date of Service: 08-27-23 @ 05:08           CARDIOLOGY     PROGRESS  NOTE   ________________________________________________    CHIEF COMPLAINT:Patient is a 78y old  Male who presents with a chief complaint of abdominal pain/ rectal bleed (26 Aug 2023 12:26)  no complain  	  REVIEW OF SYSTEMS:  CONSTITUTIONAL: No fever, weight loss, or fatigue  EYES: No eye pain, visual disturbances, or discharge  ENT:  No difficulty hearing, tinnitus, vertigo; No sinus or throat pain  NECK: No pain or stiffness  RESPIRATORY: No cough, wheezing, chills or hemoptysis; no Shortness of Breath  CARDIOVASCULAR: No chest pain, palpitations, passing out, dizziness, or leg swelling  GASTROINTESTINAL: No abdominal or epigastric pain. No nausea, vomiting, or hematemesis; No diarrhea or constipation. No melena or hematochezia.  GENITOURINARY: No dysuria, frequency, hematuria, or incontinence  NEUROLOGICAL: No headaches, memory loss, loss of strength, numbness, or tremors  SKIN: No itching, burning, rashes, or lesions   LYMPH Nodes: No enlarged glands  ENDOCRINE: No heat or cold intolerance; No hair loss  MUSCULOSKELETAL: No joint pain or swelling; No muscle, back, or extremity pain  PSYCHIATRIC: No depression, anxiety, mood swings, or difficulty sleeping  HEME/LYMPH: No easy bruising, or bleeding gums  ALLERGY AND IMMUNOLOGIC: No hives or eczema	    [x ] All others negative	  [ ] Unable to obtain    PHYSICAL EXAM:  T(C): 36.6 (08-27-23 @ 04:48), Max: 36.6 (08-27-23 @ 04:48)  HR: 73 (08-27-23 @ 04:48) (55 - 73)  BP: 120/67 (08-27-23 @ 04:48) (105/63 - 131/79)  RR: 18 (08-27-23 @ 04:48) (18 - 19)  SpO2: 93% (08-27-23 @ 04:48) (92% - 94%)  Wt(kg): --  I&O's Summary    25 Aug 2023 07:01  -  26 Aug 2023 07:00  --------------------------------------------------------  IN: 100 mL / OUT: 215 mL / NET: -115 mL    26 Aug 2023 07:01  -  27 Aug 2023 05:08  --------------------------------------------------------  IN: 880 mL / OUT: 2800 mL / NET: -1920 mL        Appearance: Normal	  HEENT:   Normal oral mucosa, PERRL, EOMI	  Lymphatic: No lymphadenopathy  Cardiovascular: Normal S1 S2, No JVD, + murmurs, No edema  Respiratory: rhonchi  Psychiatry: A & O x 3, Mood & affect appropriate  Gastrointestinal:  Soft, Non-tender, + BS	  Skin: No rashes, No ecchymoses, No cyanosis	  Neurologic: Non-focal  Extremities: Normal range of motion, No clubbing, cyanosis or edema  Vascular: Peripheral pulses palpable 2+ bilaterally    MEDICATIONS  (STANDING):  apixaban 5 milliGRAM(s) Oral two times a day  epoetin val-epbx (RETACRIT) Injectable 4000 Unit(s) IV Push <User Schedule>  erythromycin   Ointment 1 Application(s) Left EYE three times a day  escitalopram 5 milliGRAM(s) Oral daily  hydrocortisone hemorrhoidal Suppository 1 Suppository(s) Rectal two times a day  levothyroxine 137 MICROGram(s) Oral daily  mesalamine Suppository 1000 milliGRAM(s) Rectal at bedtime  metoprolol succinate  milliGRAM(s) Oral daily  midodrine. 10 milliGRAM(s) Oral three times a day  pantoprazole    Tablet 40 milliGRAM(s) Oral before breakfast  polyethylene glycol 3350 17 Gram(s) Oral daily  senna 2 Tablet(s) Oral at bedtime  simvastatin 40 milliGRAM(s) Oral at bedtime      TELEMETRY: 	    ECG:  	  RADIOLOGY:  OTHER: 	  	  LABS:	 	    CARDIAC MARKERS:                                9.8    5.20  )-----------( 128      ( 26 Aug 2023 07:02 )             30.5     08-26    138  |  100  |  49<H>  ----------------------------<  71  4.5   |  27  |  2.25<H>    Ca    10.4      26 Aug 2023 06:58      proBNP:   Lipid Profile:   HgA1c:   TSH:         Assessment and plan  ---------------------------  Liam Garza is 79 y.o. M with PMHx Significant for HLD, A-fib, BPH, ESRD, CANDY, who presents to the ED with c.o. abd pain, rectal pain, and bloody stools.  Per patient, he has been experiencing abd pain and rectal bleeding onset 1 day ago.  Patient notes pain is localized to the suprapubic area, radiates to the RLQ.  He notes pain is intermittent and has had minimal improvement with Tylenol.  Nothing seems to exacerbate his pain.  His only other associated symptom is chills.  Otherwise he denies fevers, N/V, HA, changes in his vision, sore throat, SOB, CP, diarrhea or constipation. Blood per rectum is accumulated in his depends. Of note, patient notes rectal pain began in May which she describes as sharp and has been constant since onset.  pt with hx of chronic a.fib, chf systolic EF 35 to 40% s/p ppm ,MICRA sec to ppm exrtacction sec to + BC.  sob sec acute on chronic chf systolc, fluid overload  renal eval possible HD today  ?rectal bleed hold AC, GI eval  ASHD / A. FIb continue beta blocker   repeat echo with hx of pericardial effusion  GI/ MED appreciated  pulmonary edema/ chf, repeat echo check MV, continue HD with increase fluid withdrawal as tolerated  advance diet as  tolerated  doing better, oob to chair/ physical therapy  blood cultures are still +, ID appreciated awaiting ECHO concern about bioprosthetic MV, may need ERENDIRA/ id noted  if no improvement in Ef pt will need AICD in 3 months as per primary prevention  vascular consulted for  av matthew/ fistulae, appreciated /vein mapping  pt is clear cardiac wise for the vascular procedure today   will dc ACE sec to low bp and normal EF on this echo  will switch heparin too NOAC post procedure for AC sec to A.Fib  vascular appreciated, s/p dialysis catheter  dc planning to rehab/ physical therapy

## 2023-08-28 LAB
ANION GAP SERPL CALC-SCNC: 13 MMOL/L — SIGNIFICANT CHANGE UP (ref 5–17)
BUN SERPL-MCNC: 54 MG/DL — HIGH (ref 7–23)
CALCIUM SERPL-MCNC: 10.1 MG/DL — SIGNIFICANT CHANGE UP (ref 8.4–10.5)
CHLORIDE SERPL-SCNC: 102 MMOL/L — SIGNIFICANT CHANGE UP (ref 96–108)
CO2 SERPL-SCNC: 25 MMOL/L — SIGNIFICANT CHANGE UP (ref 22–31)
CREAT SERPL-MCNC: 3.33 MG/DL — HIGH (ref 0.5–1.3)
EGFR: 18 ML/MIN/1.73M2 — LOW
GLUCOSE SERPL-MCNC: 89 MG/DL — SIGNIFICANT CHANGE UP (ref 70–99)
HCT VFR BLD CALC: 33.1 % — LOW (ref 39–50)
HGB BLD-MCNC: 10.4 G/DL — LOW (ref 13–17)
MCHC RBC-ENTMCNC: 29 PG — SIGNIFICANT CHANGE UP (ref 27–34)
MCHC RBC-ENTMCNC: 31.4 GM/DL — LOW (ref 32–36)
MCV RBC AUTO: 92.2 FL — SIGNIFICANT CHANGE UP (ref 80–100)
NRBC # BLD: 0 /100 WBCS — SIGNIFICANT CHANGE UP (ref 0–0)
PLATELET # BLD AUTO: 125 K/UL — LOW (ref 150–400)
POTASSIUM SERPL-MCNC: 4.2 MMOL/L — SIGNIFICANT CHANGE UP (ref 3.5–5.3)
POTASSIUM SERPL-SCNC: 4.2 MMOL/L — SIGNIFICANT CHANGE UP (ref 3.5–5.3)
RBC # BLD: 3.59 M/UL — LOW (ref 4.2–5.8)
RBC # FLD: 16.9 % — HIGH (ref 10.3–14.5)
SODIUM SERPL-SCNC: 140 MMOL/L — SIGNIFICANT CHANGE UP (ref 135–145)
WBC # BLD: 6.02 K/UL — SIGNIFICANT CHANGE UP (ref 3.8–10.5)
WBC # FLD AUTO: 6.02 K/UL — SIGNIFICANT CHANGE UP (ref 3.8–10.5)

## 2023-08-28 PROCEDURE — 99232 SBSQ HOSP IP/OBS MODERATE 35: CPT | Mod: GC

## 2023-08-28 RX ADMIN — ESCITALOPRAM OXALATE 5 MILLIGRAM(S): 10 TABLET, FILM COATED ORAL at 12:03

## 2023-08-28 RX ADMIN — APIXABAN 5 MILLIGRAM(S): 2.5 TABLET, FILM COATED ORAL at 20:36

## 2023-08-28 RX ADMIN — SENNA PLUS 2 TABLET(S): 8.6 TABLET ORAL at 21:39

## 2023-08-28 RX ADMIN — Medication 137 MICROGRAM(S): at 06:05

## 2023-08-28 RX ADMIN — ERYTHROPOIETIN 4000 UNIT(S): 10000 INJECTION, SOLUTION INTRAVENOUS; SUBCUTANEOUS at 17:50

## 2023-08-28 RX ADMIN — MIDODRINE HYDROCHLORIDE 10 MILLIGRAM(S): 2.5 TABLET ORAL at 12:03

## 2023-08-28 RX ADMIN — MIDODRINE HYDROCHLORIDE 10 MILLIGRAM(S): 2.5 TABLET ORAL at 20:36

## 2023-08-28 RX ADMIN — PANTOPRAZOLE SODIUM 40 MILLIGRAM(S): 20 TABLET, DELAYED RELEASE ORAL at 06:05

## 2023-08-28 RX ADMIN — APIXABAN 5 MILLIGRAM(S): 2.5 TABLET, FILM COATED ORAL at 06:06

## 2023-08-28 RX ADMIN — POLYETHYLENE GLYCOL 3350 17 GRAM(S): 17 POWDER, FOR SOLUTION ORAL at 12:03

## 2023-08-28 RX ADMIN — SIMVASTATIN 40 MILLIGRAM(S): 20 TABLET, FILM COATED ORAL at 21:39

## 2023-08-28 RX ADMIN — Medication 100 MILLIGRAM(S): at 06:05

## 2023-08-28 RX ADMIN — MIDODRINE HYDROCHLORIDE 10 MILLIGRAM(S): 2.5 TABLET ORAL at 06:05

## 2023-08-28 NOTE — PROGRESS NOTE ADULT - ASSESSMENT
a/p colitis  constipation    plan  ppi once a day  cont bowel regimen   monitor stool output  disimpacted by surgery   NM GI Bleeding scan noted, no evidence of active bleeding  Hgb stable  started on hep gtt; monitor hgb  cont mesalamine supp  dc planning as per primary   d/w pt    I reviewed the overnight course of events on the unit, re-confirming the patient history. I discussed the care with the patient and their family  The plan of care was discussed with the physician assistant and modifications were made to the notation where appropriate.   Differential diagnosis and plan of care discussed with patient after the evaluation  50 minutes spent on total encounter of which more than fifty percent of the encounter was spent counseling and/or coordinating care by the attending physician.  Advanced care planning was discussed with patient and family.  Advanced care planning forms were reviewed and discussed.  Risks, benefits and alternatives of gastroenterologic procedures were discussed in detail and all questions were answered.       Advanced care planning was discussed with patient and family.  Advanced care planning forms were reviewed and discussed.  Risks, benefits and alternatives of gastroenterologic procedures were discussed in detail and all questions were answered.    30 minutes spent.

## 2023-08-28 NOTE — PROGRESS NOTE ADULT - SUBJECTIVE AND OBJECTIVE BOX
Date of Service: 08-28-23 @ 07:24           CARDIOLOGY     PROGRESS  NOTE   ________________________________________________    CHIEF COMPLAINT:Patient is a 78y old  Male who presents with a chief complaint of abdominal pain/ rectal bleed (27 Aug 2023 10:59)  no complain  	  REVIEW OF SYSTEMS:  CONSTITUTIONAL: No fever, weight loss, or fatigue  EYES: No eye pain, visual disturbances, or discharge  ENT:  No difficulty hearing, tinnitus, vertigo; No sinus or throat pain  NECK: No pain or stiffness  RESPIRATORY: No cough, wheezing, chills or hemoptysis; No Shortness of Breath  CARDIOVASCULAR: No chest pain, palpitations, passing out, dizziness, or leg swelling  GASTROINTESTINAL: No abdominal or epigastric pain. No nausea, vomiting, or hematemesis; No diarrhea or constipation. No melena or hematochezia.  GENITOURINARY: No dysuria, frequency, hematuria, or incontinence  NEUROLOGICAL: No headaches, memory loss, loss of strength, numbness, or tremors  SKIN: No itching, burning, rashes, or lesions   LYMPH Nodes: No enlarged glands  ENDOCRINE: No heat or cold intolerance; No hair loss  MUSCULOSKELETAL: No joint pain or swelling; No muscle, back, or extremity pain  PSYCHIATRIC: No depression, anxiety, mood swings, or difficulty sleeping  HEME/LYMPH: No easy bruising, or bleeding gums  ALLERGY AND IMMUNOLOGIC: No hives or eczema	    [x ] All others negative	  [ ] Unable to obtain    PHYSICAL EXAM:  T(C): 36.6 (08-28-23 @ 04:15), Max: 37 (08-27-23 @ 19:23)  HR: 67 (08-28-23 @ 04:15) (55 - 67)  BP: 113/73 (08-28-23 @ 04:15) (92/55 - 113/73)  RR: 18 (08-28-23 @ 04:15) (18 - 18)  SpO2: 94% (08-28-23 @ 04:15) (94% - 96%)  Wt(kg): --  I&O's Summary    27 Aug 2023 07:01  -  28 Aug 2023 07:00  --------------------------------------------------------  IN: 560 mL / OUT: 550 mL / NET: 10 mL        Appearance: Normal	  HEENT:   Normal oral mucosa, PERRL, EOMI	  Lymphatic: No lymphadenopathy  Cardiovascular: Normal S1 S2, No JVD, + murmurs, No edema  Respiratory: rhonchi  Psychiatry: A & O x 3, Mood & affect appropriate  Gastrointestinal:  Soft, Non-tender, + BS	  Skin: No rashes, No ecchymoses, No cyanosis	  Neurologic: Non-focal  Extremities: Normal range of motion, No clubbing, cyanosis or edema  Vascular: Peripheral pulses palpable 2+ bilaterally    MEDICATIONS  (STANDING):  apixaban 5 milliGRAM(s) Oral two times a day  epoetin val-epbx (RETACRIT) Injectable 4000 Unit(s) IV Push <User Schedule>  erythromycin   Ointment 1 Application(s) Left EYE three times a day  escitalopram 5 milliGRAM(s) Oral daily  hydrocortisone hemorrhoidal Suppository 1 Suppository(s) Rectal two times a day  levothyroxine 137 MICROGram(s) Oral daily  mesalamine Suppository 1000 milliGRAM(s) Rectal at bedtime  metoprolol succinate  milliGRAM(s) Oral daily  midodrine. 10 milliGRAM(s) Oral three times a day  pantoprazole    Tablet 40 milliGRAM(s) Oral before breakfast  polyethylene glycol 3350 17 Gram(s) Oral daily  senna 2 Tablet(s) Oral at bedtime  simvastatin 40 milliGRAM(s) Oral at bedtime      TELEMETRY: 	    ECG:  	  RADIOLOGY:  OTHER: 	  	  LABS:	 	    CARDIAC MARKERS:        proBNP:   Lipid Profile:   HgA1c:   TSH:         Assessment and plan  ---------------------------  Liam Garza is 79 y.o. M with PMHx Significant for HLD, A-fib, BPH, ESRD, CANDY, who presents to the ED with c.o. abd pain, rectal pain, and bloody stools.  Per patient, he has been experiencing abd pain and rectal bleeding onset 1 day ago.  Patient notes pain is localized to the suprapubic area, radiates to the RLQ.  He notes pain is intermittent and has had minimal improvement with Tylenol.  Nothing seems to exacerbate his pain.  His only other associated symptom is chills.  Otherwise he denies fevers, N/V, HA, changes in his vision, sore throat, SOB, CP, diarrhea or constipation. Blood per rectum is accumulated in his depends. Of note, patient notes rectal pain began in May which she describes as sharp and has been constant since onset.  pt with hx of chronic a.fib, chf systolic EF 35 to 40% s/p ppm ,MICRA sec to ppm exrtacction sec to + BC.  sob sec acute on chronic chf systolc, fluid overload  renal eval possible HD today  ?rectal bleed hold AC, GI eval  ASHD / A. FIb continue beta blocker   repeat echo with hx of pericardial effusion  GI/ MED appreciated  pulmonary edema/ chf, repeat echo check MV, continue HD with increase fluid withdrawal as tolerated  advance diet as  tolerated  doing better, oob to chair/ physical therapy  blood cultures are still +, ID appreciated awaiting ECHO concern about bioprosthetic MV, may need ERENDIRA/ id noted  if no improvement in Ef pt will need AICD in 3 months as per primary prevention  vascular consulted for  av matthew/ fistulae, appreciated /vein mapping  pt is clear cardiac wise for the vascular procedure today   will dc ACE sec to low bp and normal EF on this echo  will switch heparin too NOAC post procedure for AC sec to A.Fib  vascular appreciated, s/p dialysis catheter  Left eye pain, awaiting optho eval  dc planning to rehab/ physical therapy    	         Anesthesia Type: 1% lidocaine with epinephrine

## 2023-08-28 NOTE — CHART NOTE - NSCHARTNOTEFT_GEN_A_CORE
Nutrition Follow Up Note  Patient seen for: follow up  Chart reviewed, events noted    Per chart, patient is a 80 y/o male with PMH including AFib, BPH, HLD, ESRD on HD, CANDY. Patient presents to [facility] w/ abdominal pain, rectal pain, bloody BMs.    Source: [x] Patient       [x] EMR        [] RN        [] Family at bedside       [] Other:  - If unable to interview patient: [] Trach/Vent/BiPAP  [] Disoriented/confused/inappropriate to interview    Diet Order:   Diet, Renal Restrictions:   For patients receiving Renal Replacement - No Protein Restr, No Conc K, No Conc Phos, Low Sodium (23)    Is current order appropriate/adequate? [] Yes  []  No:     PO intake:   [] >75%  Adequate    [] 50-75%  Fair       [] <50%  Poor    Nutrition-related concerns:  -   -   -     GI:  Last BM:   Bowel Regimen? [x] Yes:   Bowel Regimen? [x] No    Weights:   Daily Weight in k.9 (), Weight in k.9 (), Weight in k.9 (), Weight in k.9 (), Weight in k (), Weight in k.5 ()    Nutritionally Pertinent MEDICATIONS  (STANDING):  levothyroxine  mesalamine Suppository  metoprolol succinate ER  midodrine.  pantoprazole    Tablet  polyethylene glycol 3350  senna  simvastatin    Pertinent Labs:  @ 07:46: Na 140, BUN 54<H>, Cr 3.33<H>, BG 89, K+ 4.2, Phos --, Mg --, Alk Phos --, ALT/SGPT --, AST/SGOT --, HbA1c --      Finger Sticks:      Skin per nursing documentation:   Edema:     Estimated Needs:   [x] no change since previous assessment  Estimated Caloric Needs: 2367-2761kcal/day (30-35kcal/kg)  Estimated Protein Needs: 103-126g/pro/day (1.3-1.6g/pro/kg)  Estimated Fluid Needs: defer to team    Previous Nutrition Diagnosis: increased nutrient needs  Nutrition Diagnosis is: [x] ongoing  [] resolved [] not applicable     New Nutrition Diagnosis: [x] Not applicable    Nutrition Care Plan:  [x] In Progress  [] Achieved  [] Not applicable    Nutrition Interventions:     Education Provided:       [] Yes:  [] No:     Recommendations:          Monitoring and Evaluation:   Continue to monitor nutritional intake, tolerance to diet prescription, weights, labs, skin integrity    RD remains available upon request and will follow up per protocol  Pollo Caballero RD, CDN - TEAMS preferred / Pager #[phone]. Nutrition Follow Up Note  Patient seen for: follow up  Chart reviewed, events noted    Per chart, patient is a 80 y/o male with PMH including AFib, BPH, HLD, ESRD on HD, CANDY. Patient presents to [facility] w/ abdominal pain, rectal pain, bloody BMs.    Source: [x] Patient       [x] EMR        [] RN        [] Family at bedside       [] Other:  - If unable to interview patient: [] Trach/Vent/BiPAP  [] Disoriented/confused/inappropriate to interview    Diet Order:   Diet, Renal Restrictions:   For patients receiving Renal Replacement - No Protein Restr, No Conc K, No Conc Phos, Low Sodium (23)    Is current order appropriate/adequate? [x] Yes  []  No:     PO intake:   [x] >75%  Adequate    [] 50-75%  Fair       [] <50%  Poor    Nutrition-related concerns:  -   -   -     GI:  Last BM:   Bowel Regimen? [x] Yes: senna, miralax    Weights:   Daily Weight in k.9 (), Weight in k.9 (), Weight in k.9 (), Weight in k.9 (), Weight in k (), Weight in k.5 ()    Nutritionally Pertinent MEDICATIONS  (STANDING):  levothyroxine  mesalamine Suppository  metoprolol succinate ER  midodrine.  pantoprazole    Tablet  polyethylene glycol 3350  senna  simvastatin    Pertinent Labs:  @ 07:46: Na 140, BUN 54<H>, Cr 3.33<H>, BG 89, K+ 4.2, Phos --, Mg --, Alk Phos --, ALT/SGPT --, AST/SGOT --, HbA1c --    Skin per nursing documentation: stage I PI to b/l heels per documentation  Edema: +1 b/l foot edema per documentation    Estimated Needs:   [x] no change since previous assessment  Estimated Caloric Needs: 2367-2761kcal/day (30-35kcal/kg)  Estimated Protein Needs: 103-126g/pro/day (1.3-1.6g/pro/kg)  Estimated Fluid Needs: defer to team    Previous Nutrition Diagnosis: increased nutrient needs  Nutrition Diagnosis is: [x] ongoing  [] resolved [] not applicable     New Nutrition Diagnosis: [x] Not applicable    Nutrition Care Plan:  [x] In Progress  [] Achieved  [] Not applicable    Nutrition Interventions:     Education Provided:       [] Yes:  [] No:     Recommendations:          Monitoring and Evaluation:   Continue to monitor nutritional intake, tolerance to diet prescription, weights, labs, skin integrity    RD remains available upon request and will follow up per protocol  Pollo Caballero RD, CDN - TEAMS preferred / Pager #[phone]. Nutrition Follow Up Note  Patient seen for: follow up  Chart reviewed, events noted    Per chart, patient is a 80 y/o male with PMH including AFib, BPH, HLD, ESRD on HD, CANDY. Patient presents to [facility] w/ abdominal pain, rectal pain, bloody BMs.    Source: [x] Patient       [x] EMR        [] RN        [] Family at bedside       [] Other:  - If unable to interview patient: [] Trach/Vent/BiPAP  [] Disoriented/confused/inappropriate to interview    Diet Order:   Diet, Renal Restrictions:   For patients receiving Renal Replacement - No Protein Restr, No Conc K, No Conc Phos, Low Sodium (23)    Is current order appropriate/adequate? [x] Yes  []  No:     PO intake:   [x] >75%  Adequate    [] 50-75%  Fair       [] <50%  Poor    Nutrition-related concerns:  - patient reports continuing to have a good appetite and eating well during admission, no chewing/swallowing impairment or nausea/vomiting reported, passing BMs noted with last BM being today  - patient w/  concerns, all were addressed and will be elevated to senior   - remains ordered for levothyroxine    GI:  Last BM:   Bowel Regimen? [x] Yes: senna, miralax    Weights:   Daily Weight in k.9 (), Weight in k.9 (), Weight in k.9 (), Weight in k.9 (), Weight in k (), Weight in k.5 ()  - overall weight fluctuations noted, some degree of fluctuations to be expected w/ interdialytic weight gains/losses, has also been intermittently w/ +1 to +2 edema during admission, will continue to follow weight trend    Nutritionally Pertinent MEDICATIONS  (STANDING):  levothyroxine  mesalamine Suppository  metoprolol succinate ER  midodrine.  pantoprazole    Tablet  polyethylene glycol 3350  senna  simvastatin    Pertinent Labs:  @ 07:46: Na 140, BUN 54<H>, Cr 3.33<H>, BG 89, K+ 4.2, Phos --, Mg --, Alk Phos --, ALT/SGPT --, AST/SGOT --, HbA1c --    Skin per nursing documentation: stage I PI to b/l heels per documentation  Edema: +1 b/l foot edema per documentation    Estimated Needs:   [x] no change since previous assessment  Estimated Caloric Needs: 2367-2761kcal/day (30-35kcal/kg)  Estimated Protein Needs: 103-126g/pro/day (1.3-1.6g/pro/kg)  Estimated Fluid Needs: defer to team    Previous Nutrition Diagnosis: increased nutrient needs  Nutrition Diagnosis is: [x] ongoing  [] resolved [] not applicable     New Nutrition Diagnosis: [x] Not applicable    Nutrition Care Plan:  [x] In Progress  [] Achieved  [] Not applicable    Nutrition Interventions:     Education Provided:       [x] Yes: current diet, food preferences, patient's  concerns addressed    Recommendations:      1. continue current diet as tolerated of: renal diet  2. encourage PO intake, protein source with each meal as tolerated  3. may have 2x protein sources with meals to help support extra caloric/protein intake  4. recommend adding back Nepro Shake 1x/day to help provide extra calories and protein (was receiving prior to AVF)  5. monitor PO intake, weight trend, electrolytes, blood glucose levels, labs, BMs    Monitoring and Evaluation:   Continue to monitor nutritional intake, tolerance to diet prescription, weights, labs, skin integrity    RD remains available upon request and will follow up per protocol  Pollo Caballero RD, CDN - TEAMS preferred / Pager #[phone].

## 2023-08-28 NOTE — PROGRESS NOTE ADULT - ATTENDING COMMENTS
# ESRD - HD MWF. Initially BEULAH. Dialysis since 4/2023. Progressed to ESRD now.  HD today as planned.     # Medication monitoring encounter: medication dose reviewed. Please dose medications to CrCl<15    The rest of the recommendations as per fellow's note.    Xochitl Penny MD  Attending Nephrologist  [phone] or via [employer] .

## 2023-08-28 NOTE — PROGRESS NOTE ADULT - ASSESSMENT
78  year old male    h/o  CAD s/p stent , asa.     A-fib/ PPM, , hypothyroid, and total left knee replacement    prior ppm  interrogation  with  WCT   Ct chest, retrosternal hematoma.  mod left pl effusion/ has   c/c leg  pain      admitted  with  abd pain and rectal bleeding onset 1 day ago.. arrived form n home     has  no pain  now    c/c   systolic  and  diastolic  chf,,      cardiomyopathy, Mitral  valvuloplasty,   h/o  c/c  l/edema  of L  leg    h/o    c/c AFIB,   has  PPM     h/o c/c   Anemia, , hb is  12.  gi   known to  dr joann INFANTE,     cath, with 2 vessel disease,  s/p  CABG and  MVR  surg d r marni      echo,  on 7/2022,  ef  35/ new/ severe  TR,  and ,  cath,   was non  obstructive     s/p   MSSA  bacterinia , in march 2023, completed  iv ancef..    s/p   explant pf  ppm  and  Micra  placement on  3/30/23. dr sunitha rocha      Echo,  3/2023,  ef  25,  mod  AI. severe  TR . RV hypokinesis    BEULAH, from  AIN, dx  recently , ancef / was  switched   to iv vanco,   CKD, on HD,  has  right Permcath .house  renal   s/p  renal bx on 4/ 14.. 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 + , Ig A  Lamda., known to heme  dr dobson.  pe r heme,  no  need  for  b marrow  bx     CT chest,  pl  effusion,   gi    f/p,  no  bleeding /and   nuclear  scan, no  bleeding   bacteremia   Clostridium cadeveris,  iv   ertapenem, / completed       AVF of  left arm     on eliquis/  doing  well     start   d/c  planning/  pt  wants  Megan/  ha s been  cleraed  for   d/c          card /  dr mikayla dick       < from: Intra-Operative Transesophageal Echo W or WO Ultrasound Enhancing Agent (03.30.23 @ 12:48) >  --------------------------------------------------------------------------------  PRE-BYPASS FINDINGS  Left Ventricle:  Left ventricular ejection fraction is estimated at 25 to 30%. There is severe kypokinesis in the left ventricle.  Right Ventricle:  Moderately reduced systolic function. Moderately reduced systolic function. There is moderate hypokinesis in the right ventricle.  Left Atrium:  Diffuse smoke visualized in LA.  Aortic Valve:  The aortic valve appears trileaflet. There is moderate aortic regurgitation.  No obvious vegetations visualized  Mitral Valve:  Bioprosthetic valve is present in the mitral position. There is calcification of the mitral valve annulus.  No obvious vegetations visualized.  Tricuspid Valve:  There is moderate-severe tricuspid regurgitation. TR unchanged following lead extraction.  Pulmonic Valve:  There is mild pulmonic regurgitation.  Pericardium:  No pericardial effusion seen. No pericardial effusion visualized before and after lead extraction.  Electronically signed by Jimmy Gambino on 3/30/2023 at 3:00:58 PM  *** Final ***  < end of copied text >

## 2023-08-28 NOTE — PROGRESS NOTE ADULT - SUBJECTIVE AND OBJECTIVE BOX
date of service: 08-28-23 @ 09:30  afberile  REVIEW OF SYSTEMS:  CONSTITUTIONAL: No fever,  no  weight loss  ENT:  No  tinnitus,   no   vertigo  NECK: No pain or stiffness  RESPIRATORY: No cough, wheezing, chills or hemoptysis;    No Shortness of Breath  CARDIOVASCULAR: No chest pain, palpitations, dizziness  GASTROINTESTINAL: No abdominal or epigastric pain. No nausea, vomiting, or hematemesis; No diarrhea  No melena or hematochezia.  GENITOURINARY: No dysuria, frequency, hematuria, or incontinence  NEUROLOGICAL: No headaches  SKIN: No itching,  no   rash  LYMPH Nodes: No enlarged glands  ENDOCRINE: No heat or cold intolerance  MUSCULOSKELETAL: No joint pain or swelling  PSYCHIATRIC: No depression, anxiety  HEME/LYMPH: No easy bruising, or bleeding gums  ALLERGY AND IMMUNOLOGIC: No hives or eczema	    MEDICATIONS  (STANDING):  apixaban 5 milliGRAM(s) Oral two times a day  epoetin val-epbx (RETACRIT) Injectable 4000 Unit(s) IV Push <User Schedule>  erythromycin   Ointment 1 Application(s) Left EYE three times a day  escitalopram 5 milliGRAM(s) Oral daily  hydrocortisone hemorrhoidal Suppository 1 Suppository(s) Rectal two times a day  levothyroxine 137 MICROGram(s) Oral daily  mesalamine Suppository 1000 milliGRAM(s) Rectal at bedtime  metoprolol succinate  milliGRAM(s) Oral daily  midodrine. 10 milliGRAM(s) Oral three times a day  pantoprazole    Tablet 40 milliGRAM(s) Oral before breakfast  polyethylene glycol 3350 17 Gram(s) Oral daily  senna 2 Tablet(s) Oral at bedtime  simvastatin 40 milliGRAM(s) Oral at bedtime    MEDICATIONS  (PRN):  aluminum hydroxide/magnesium hydroxide/simethicone Suspension 30 milliLiter(s) Oral every 6 hours PRN Dyspepsia  artificial  tears Solution 1 Drop(s) Both EYES four times a day PRN Dry Eyes  midodrine. 20 milliGRAM(s) Oral <User Schedule> PRN PRIOR TO HD      Vital Signs Last 24 Hrs  T(C): 36.6 (28 Aug 2023 04:15), Max: 37 (27 Aug 2023 19:23)  T(F): 97.8 (28 Aug 2023 04:15), Max: 98.6 (27 Aug 2023 19:23)  HR: 67 (28 Aug 2023 04:15) (55 - 67)  BP: 113/73 (28 Aug 2023 04:15) (92/55 - 113/73)  BP(mean): --  RR: 18 (28 Aug 2023 04:15) (18 - 18)  SpO2: 94% (28 Aug 2023 04:15) (94% - 96%)    Parameters below as of 28 Aug 2023 04:15  Patient On (Oxygen Delivery Method): room air      CAPILLARY BLOOD GLUCOSE        I&O's Summary    27 Aug 2023 07:01  -  28 Aug 2023 07:00  --------------------------------------------------------  IN: 560 mL / OUT: 550 mL / NET: 10 mL          Appearance: Normal	  HEENT:   Normal oral mucosa, PERRL, EOMI	  Lymphatic: No lymphadenopathy  Cardiovascular: Normal S1 S2, No JVD  Respiratory: Lungs clear to auscultation	  Gastrointestinal:  Soft, Non-tender, + BS	  Skin: No rash, No ecchymoses	  Extremities:     LABS:                        10.4   6.02  )-----------( 125      ( 28 Aug 2023 07:48 )             33.1     08-28    140  |  102  |  54<H>  ----------------------------<  89  4.2   |  25  |  3.33<H>    Ca    10.1      28 Aug 2023 07:46            Urinalysis Basic - ( 28 Aug 2023 07:46 )    Color: x / Appearance: x / SG: x / pH: x  Gluc: 89 mg/dL / Ketone: x  / Bili: x / Urobili: x   Blood: x / Protein: x / Nitrite: x   Leuk Esterase: x / RBC: x / WBC x   Sq Epi: x / Non Sq Epi: x / Bacteria: x                      Consultant(s) Notes Reviewed:      Care Discussed with Consultants/Other Providers:

## 2023-08-28 NOTE — PROGRESS NOTE ADULT - SUBJECTIVE AND OBJECTIVE BOX
[facility] DIVISION OF KIDNEY DISEASES AND HYPERTENSION   FOLLOW UP NOTE    --------------------------------------------------------------------------------  Chief Complaint:    24 hour events/subjective: Pt. was seen and examined today. Denies any shortness of breath, chest pain, nausea or vomiting, abd pain. Now off of O2.         PAST HISTORY  --------------------------------------------------------------------------------  No significant changes to PMH, PSH, FHx, SHx, unless otherwise noted    ALLERGIES & MEDICATIONS  --------------------------------------------------------------------------------  Allergies    Myrbetriq (Hives)  tamsulosin (Hives)  alfuzosin (Hives)  levofloxacin (Hives)    Intolerances      Standing Inpatient Medications  apixaban 5 milliGRAM(s) Oral two times a day  epoetin val-epbx (RETACRIT) Injectable 4000 Unit(s) IV Push <User Schedule>  erythromycin   Ointment 1 Application(s) Left EYE three times a day  escitalopram 5 milliGRAM(s) Oral daily  hydrocortisone hemorrhoidal Suppository 1 Suppository(s) Rectal two times a day  levothyroxine 137 MICROGram(s) Oral daily  mesalamine Suppository 1000 milliGRAM(s) Rectal at bedtime  metoprolol succinate  milliGRAM(s) Oral daily  midodrine. 10 milliGRAM(s) Oral three times a day  pantoprazole    Tablet 40 milliGRAM(s) Oral before breakfast  polyethylene glycol 3350 17 Gram(s) Oral daily  senna 2 Tablet(s) Oral at bedtime  simvastatin 40 milliGRAM(s) Oral at bedtime    PRN Inpatient Medications  aluminum hydroxide/magnesium hydroxide/simethicone Suspension 30 milliLiter(s) Oral every 6 hours PRN  artificial  tears Solution 1 Drop(s) Both EYES four times a day PRN  midodrine. 20 milliGRAM(s) Oral <User Schedule> PRN      REVIEW OF SYSTEMS  --------------------------------------------------------------------------------  Gen: No fevers/chills  Head/Eyes/Ears: No HA   Respiratory: No dyspnea, cough  CV: No chest pain  GI: No abdominal pain, diarrhea  : No dysuria, hematuria  MSK: No  edema  Skin: No rashes  Heme: No easy bruising or bleeding    All other systems were reviewed and are negative, except as noted.    VITALS/PHYSICAL EXAM  --------------------------------------------------------------------------------  T(C): 36.6 (08-28-23 @ 04:15), Max: 37 (08-27-23 @ 19:23)  HR: 67 (08-28-23 @ 04:15) (55 - 67)  BP: 113/73 (08-28-23 @ 04:15) (92/55 - 113/73)  RR: 18 (08-28-23 @ 04:15) (18 - 18)  SpO2: 94% (08-28-23 @ 04:15) (94% - 96%)  Wt(kg): --        08-27-23 @ 07:01  -  08-28-23 @ 07:00  --------------------------------------------------------  IN: 560 mL / OUT: 550 mL / NET: 10 mL        Physical Exam:  	Gen: NAD, atraumatic.   	HEENT: Anicteric, normal oral mucosa.   	Pulm: CTA B/L, no crackles  	CV: S1S2+, no m/g/r  	Abd: Soft, +BS, NT, ND  	Ext: No LE edema B/L  	Neuro: Awake, moving all 4 limbs.   pSYCH: normal mood and affect          : Knapp cath+ with clear urine  	Skin: Warm and dry  	Dialysis access: dialysis HD catheter, left forearm AVF    LABS/STUDIES  --------------------------------------------------------------------------------              10.4   6.02  >-----------<  125      [08-28-23 @ 07:48]              33.1     140  |  102  |  54  ----------------------------<  89      [08-28-23 @ 07:46]  4.2   |  25  |  3.33        Ca     10.1     [08-28-23 @ 07:46]            Creatinine Trend:  SCr 3.33 [08-28 @ 07:46]  SCr 2.25 [08-26 @ 06:58]  SCr 2.55 [08-25 @ 04:42]  SCr 2.21 [08-23 @ 04:31]  SCr 2.00 [08-22 @ 04:31]    Urinalysis - [08-28-23 @ 07:46]      Color  / Appearance  / SG  / pH       Gluc 89 / Ketone   / Bili  / Urobili        Blood  / Protein  / Leuk Est  / Nitrite       RBC  / WBC  / Hyaline  / Gran  / Sq Epi  / Non Sq Epi  / Bacteria       HBsAb <3.0      [08-11-23 @ 21:13]  HBsAg Nonreact      [08-26-23 @ 07:01]  HBcAb Nonreact      [08-11-23 @ 21:13]  HCV 0.17, Nonreact      [08-26-23 @ 07:01]      Tacrolimus  Cyclosporine  Sirolimus  Mycophenolate  BK PCR  CMV PCR  Parvo PCR  EBV PCR

## 2023-08-28 NOTE — PROGRESS NOTE ADULT - SUBJECTIVE AND OBJECTIVE BOX
[facility]  Thanh Michaels PA-C  [address] [place] [id number] [phone]    INTERVAL HPI/ OVERNIGHT EVENTS:  pt seen and examined, no new events  no abdominal pain, tolerating diet, no N/V/D      MEDICATIONS  (STANDING):  escitalopram 5 milliGRAM(s) Oral daily  hydrocortisone hemorrhoidal Suppository 1 Suppository(s) Rectal two times a day  levothyroxine 137 MICROGram(s) Oral daily  mesalamine Suppository 1000 milliGRAM(s) Rectal at bedtime  metoprolol succinate  milliGRAM(s) Oral daily  midodrine. 10 milliGRAM(s) Oral three times a day  pantoprazole    Tablet 40 milliGRAM(s) Oral before breakfast  piperacillin/tazobactam IVPB.. 3.375 Gram(s) IV Intermittent every 8 hours  polyethylene glycol 3350 17 Gram(s) Oral two times a day  senna 2 Tablet(s) Oral at bedtime  simvastatin 40 milliGRAM(s) Oral at bedtime    MEDICATIONS  (PRN):  aluminum hydroxide/magnesium hydroxide/simethicone Suspension 30 milliLiter(s) Oral every 6 hours PRN Dyspepsia  midodrine. 10 milliGRAM(s) Oral <User Schedule> PRN PRIOR TO HD  sodium chloride 0.9% Bolus. 100 milliLiter(s) IV Bolus every 5 minutes PRN SBP LESS THAN or EQUAL to 90 mmHg  escitalopram 5 milliGRAM(s) Oral daily  hydrocortisone hemorrhoidal Suppository 1 Suppository(s) Rectal two times a day  levothyroxine 137 MICROGram(s) Oral daily  mesalamine Suppository 1000 milliGRAM(s) Rectal at bedtime  metoprolol succinate  milliGRAM(s) Oral daily  PAST MEDICAL & SURGICAL HISTORY:  Afib  not on anticoagulation      CAD (coronary artery disease)      Varicose vein of leg      Hypothyroid      Pacemaker      H/O fracture of hip        H/O asbestosis      HTN (hypertension)      Pacemaker  Medtronic - Model RVDR01 1/10/2012      Stented coronary artery  drug eluding stent 2007      H/O detached retina repair        S/P cataract surgery      History of hip surgery  left hip ORIF      H/O varicose vein stripping   left leg      History of left knee replacement   - multiple infections post op requiring reoperation in , , 2019)      H/O foot surgery  for infection of right foot 2019      H/O inguinal hernia repair  right       Family history:  No pertinent family history in first degree relatives    FH: skin cancer (Father)        Social History:   no etoh no cigs   no ivda    ROS:     General:  No wt loss, fevers, chills, night sweats, fatigue,   Eyes:  Good vision, no reported pain  ENT:  No sore throat, pain, runny nose, dysphagia  CV:  No pain, palpitations, hypo/hypertension  Resp:  No dyspnea, cough, tachypnea, wheezing  GI:  No pain, No nausea, No vomiting, No diarrhea, No constipation, No weight loss, No fever, No pruritis, No rectal bleeding, No tarry stools, No dysphagia,  :  No pain, bleeding, incontinence, nocturia  Muscle:  No pain, weakness  Neuro:  No weakness, tingling, memory problems  Psych:  No fatigue, insomnia, mood problems, depression  Endocrine:  No polyuria, polydipsia, cold/heat intolerance  Heme:  No petechiae, ecchymosis, easy bruisability  Skin:  No rash, tattoos, scars, edema      PHYSICAL EXAM:   Vital Signs Last 24 Hrs  T(C): 36.6 (28 Aug 2023 04:15), Max: 37 (27 Aug 2023 19:23)  T(F): 97.8 (28 Aug 2023 04:15), Max: 98.6 (27 Aug 2023 19:23)  HR: 67 (28 Aug 2023 04:15) (62 - 67)  BP: 113/73 (28 Aug 2023 04:15) (102/57 - 113/73)  BP(mean): --  RR: 18 (28 Aug 2023 04:15) (18 - 18)  SpO2: 94% (28 Aug 2023 04:15) (94% - 95%)    Parameters below as of 28 Aug 2023 04:15  Patient On (Oxygen Delivery Method): room air        Daily Height in cm: 170.18 (11 Aug 2023 00:36)    Daily     GENERAL:  Appears stated age, well-groomed, well-nourished, no distress  HEENT:  NC/AT,  conjunctivae clear and pink, no thyromegaly, nodules, adenopathy, no JVD, sclera -anicteric  CHEST:  Full & symmetric excursion, no increased effort, breath sounds clear  HEART:  Regular rhythm, S1, S2, no murmur/rub/S3/S4, no abdominal bruit, no edema  ABDOMEN:  Soft, non-tender, non-distended, normoactive bowel sounds,  no masses ,no hepato-splenomegaly, no signs of chronic liver disease  EXTEREMITIES:  no cyanosis,clubbing or edema  SKIN:  No rash/erythema/ecchymoses/petechiae/wounds/abscess/warm/dry  NEURO:  Alert, oriented, no asterixis, no tremor, no encephalopathy    LABS:                        10.4   6.02  )-----------( 125      ( 28 Aug 2023 07:48 )             33.1       140  |  102  |  54<H>  ----------------------------<  89  4.2   |  25  |  3.33<H>    Ca    10.1      28 Aug 2023 07:46          LIVER FUNCTIONS - ( 11 Aug 2023 01:23 )  Alb: 3.4 g/dL / Pro: 7.0 g/dL / ALK PHOS: 137 U/L / ALT: 12 U/L / AST: 19 U/L / GGT: x           PT/INR - ( 11 Aug 2023 01:23 )   PT: 23.5 sec;   INR: 2.29 ratio         PTT - ( 11 Aug 2023 01:23 )  PTT:36.4 sec  Urinalysis Basic - ( 11 Aug 2023 06:13 )    Color: Dark Yellow / Appearance: Turbid / S.017 / pH: x  Gluc: x / Ketone: Negative  / Bili: Small / Urobili: 3 mg/dL   Blood: x / Protein: 100 mg/dL / Nitrite: Positive   Leuk Esterase: Large / RBC: 463 /hpf /  /HPF   Sq Epi: x / Non Sq Epi: x / Bacteria: Many      Amylase Serum--      Lipase serum12       Ammonia--      Imaging:    < from: NM GI Bleed Localization (NM GI Bleed Localization .) (23 @ 17:40) >  ACC: [id number] EXAM:  NM GI BLEEDING IMG   ORDERED BY: JAYLENE PARKS     PROCEDURE DATE:  2023          INTERPRETATION:  RADIOPHARMACEUTICAL:  20.0 mCi Tc-99m labeled autologous   red blood cells, I.V.    CLINICAL INFORMATION: GI Bleeding referred for localization of bleeding   site.    TECHNIQUE: Dynamic imaging of the abdomen and pelvis was performed for 2   hours following administration of radiolabeled autologous red blood   cells. Static images of the abdomen and pelvis in the anterior and   lateral views were obtained immediately thereafter. An Anterior view of   the head/neck was obtained for  purposes.    COMPARISON: None.    FINDINGS: There is physiologic distribution of radiolabeled red cells in   the abdomen and pelvis. There is no abnormal focus of increased   radiotracer activity to suggest the presence of an active bleeding site.    IMPRESSION:    No evidence of active bleeding site.     These results were discussed with DASIA Parks NP, by Dr. BARBRA Natarajan via   telephone with read back at about 6:00 PM on 2023.    --- End of Report ---            HAM NATARAJAN MD; Attending Nuclear Medicine  This document has been electronically signed. Aug 11 2023  6:01PM    < end of copied text >

## 2023-08-29 RX ORDER — POLYMYXIN B SULF/TRIMETHOPRIM 10000-1/ML
1 DROPS OPHTHALMIC (EYE)
Refills: 0 | Status: DISCONTINUED | OUTPATIENT
Start: 2023-08-29 | End: 2023-08-30

## 2023-08-29 RX ORDER — NYSTATIN CREAM 100000 [USP'U]/G
1 CREAM TOPICAL THREE TIMES A DAY
Refills: 0 | Status: DISCONTINUED | OUTPATIENT
Start: 2023-08-29 | End: 2023-08-30

## 2023-08-29 RX ORDER — ERYTHROMYCIN BASE 5 MG/GRAM
1 OINTMENT (GRAM) OPHTHALMIC (EYE)
Refills: 0 | Status: DISCONTINUED | OUTPATIENT
Start: 2023-08-29 | End: 2023-08-30

## 2023-08-29 RX ADMIN — SENNA PLUS 2 TABLET(S): 8.6 TABLET ORAL at 21:04

## 2023-08-29 RX ADMIN — APIXABAN 5 MILLIGRAM(S): 2.5 TABLET, FILM COATED ORAL at 05:28

## 2023-08-29 RX ADMIN — MIDODRINE HYDROCHLORIDE 10 MILLIGRAM(S): 2.5 TABLET ORAL at 05:28

## 2023-08-29 RX ADMIN — APIXABAN 5 MILLIGRAM(S): 2.5 TABLET, FILM COATED ORAL at 16:19

## 2023-08-29 RX ADMIN — Medication 1 DROP(S): at 13:49

## 2023-08-29 RX ADMIN — Medication 1 DROP(S): at 16:20

## 2023-08-29 RX ADMIN — Medication 137 MICROGRAM(S): at 05:28

## 2023-08-29 RX ADMIN — SIMVASTATIN 40 MILLIGRAM(S): 20 TABLET, FILM COATED ORAL at 21:04

## 2023-08-29 RX ADMIN — PANTOPRAZOLE SODIUM 40 MILLIGRAM(S): 20 TABLET, DELAYED RELEASE ORAL at 06:04

## 2023-08-29 RX ADMIN — NYSTATIN CREAM 1 APPLICATION(S): 100000 CREAM TOPICAL at 22:00

## 2023-08-29 RX ADMIN — Medication 1 DROP(S): at 20:40

## 2023-08-29 RX ADMIN — MIDODRINE HYDROCHLORIDE 10 MILLIGRAM(S): 2.5 TABLET ORAL at 16:18

## 2023-08-29 RX ADMIN — ESCITALOPRAM OXALATE 5 MILLIGRAM(S): 10 TABLET, FILM COATED ORAL at 11:48

## 2023-08-29 RX ADMIN — Medication 1 DROP(S): at 16:19

## 2023-08-29 RX ADMIN — MIDODRINE HYDROCHLORIDE 10 MILLIGRAM(S): 2.5 TABLET ORAL at 11:49

## 2023-08-29 NOTE — PROGRESS NOTE ADULT - SUBJECTIVE AND OBJECTIVE BOX
date of service: 08-29-23 @ 08:32  afebrile  REVIEW OF SYSTEMS:  CONSTITUTIONAL: No fever,  no  weight loss  ENT:  No  tinnitus,   no   vertigo  NECK: No pain or stiffness  RESPIRATORY: No cough, wheezing, chills or hemoptysis;    No Shortness of Breath  CARDIOVASCULAR: No chest pain, palpitations, dizziness  GASTROINTESTINAL: No abdominal or epigastric pain. No nausea, vomiting, or hematemesis; No diarrhea  No melena or hematochezia.  GENITOURINARY: No dysuria, frequency, hematuria, or incontinence  NEUROLOGICAL: No headaches  SKIN: No itching,  no   rash  LYMPH Nodes: No enlarged glands  ENDOCRINE: No heat or cold intolerance  MUSCULOSKELETAL: No joint pain or swelling  PSYCHIATRIC: No depression, anxiety  HEME/LYMPH: No easy bruising, or bleeding gums  ALLERGY AND IMMUNOLOGIC: No hives or eczema	    MEDICATIONS  (STANDING):  apixaban 5 milliGRAM(s) Oral two times a day  epoetin vla-epbx (RETACRIT) Injectable 4000 Unit(s) IV Push <User Schedule>  erythromycin   Ointment 1 Application(s) Left EYE three times a day  escitalopram 5 milliGRAM(s) Oral daily  hydrocortisone hemorrhoidal Suppository 1 Suppository(s) Rectal two times a day  levothyroxine 137 MICROGram(s) Oral daily  mesalamine Suppository 1000 milliGRAM(s) Rectal at bedtime  metoprolol succinate  milliGRAM(s) Oral daily  midodrine. 10 milliGRAM(s) Oral three times a day  pantoprazole    Tablet 40 milliGRAM(s) Oral before breakfast  polyethylene glycol 3350 17 Gram(s) Oral daily  senna 2 Tablet(s) Oral at bedtime  simvastatin 40 milliGRAM(s) Oral at bedtime    MEDICATIONS  (PRN):  aluminum hydroxide/magnesium hydroxide/simethicone Suspension 30 milliLiter(s) Oral every 6 hours PRN Dyspepsia  artificial  tears Solution 1 Drop(s) Both EYES four times a day PRN Dry Eyes  midodrine. 20 milliGRAM(s) Oral <User Schedule> PRN PRIOR TO HD      Vital Signs Last 24 Hrs  T(C): 36.6 (29 Aug 2023 07:44), Max: 37.1 (28 Aug 2023 23:26)  T(F): 97.9 (29 Aug 2023 07:44), Max: 98.7 (28 Aug 2023 23:26)  HR: 63 (29 Aug 2023 07:44) (57 - 76)  BP: 96/57 (29 Aug 2023 07:44) (93/53 - 122/73)  BP(mean): --  RR: 18 (29 Aug 2023 07:44) (18 - 18)  SpO2: 94% (29 Aug 2023 07:44) (92% - 95%)    Parameters below as of 29 Aug 2023 07:44  Patient On (Oxygen Delivery Method): room air      CAPILLARY BLOOD GLUCOSE        I&O's Summary    28 Aug 2023 07:01  -  29 Aug 2023 07:00  --------------------------------------------------------  IN: 1720 mL / OUT: 3250 mL / NET: -1530 mL          Appearance: Normal	  HEENT:   Normal oral mucosa, PERRL, EOMI	  Lymphatic: No lymphadenopathy  Cardiovascular: Normal S1 S2, No JVD  Respiratory: Lungs clear to auscultation	  Gastrointestinal:  Soft, Non-tender, + BS	  Skin: No rash, No ecchymoses	  Extremities:     LABS:                        10.4   6.02  )-----------( 125      ( 28 Aug 2023 07:48 )             33.1     08-28    140  |  102  |  54<H>  ----------------------------<  89  4.2   |  25  |  3.33<H>    Ca    10.1      28 Aug 2023 07:46            Urinalysis Basic - ( 28 Aug 2023 07:46 )    Color: x / Appearance: x / SG: x / pH: x  Gluc: 89 mg/dL / Ketone: x  / Bili: x / Urobili: x   Blood: x / Protein: x / Nitrite: x   Leuk Esterase: x / RBC: x / WBC x   Sq Epi: x / Non Sq Epi: x / Bacteria: x                      Consultant(s) Notes Reviewed:      Care Discussed with Consultants/Other Providers:

## 2023-08-29 NOTE — CONSULT NOTE ADULT - CONSULT REASON
stercoral colitis
rectal bleeding
AVF
left eye pain
abd  pain/  brbpr
BEULAH, currently on HD (M/F via R IJ tunneled HD catheter), monitoring for recovery
UTI
Hemorrhoidal artery embolization

## 2023-08-29 NOTE — PROGRESS NOTE ADULT - SUBJECTIVE AND OBJECTIVE BOX
Date of Service: 08-29-23 @ 07:46           CARDIOLOGY     PROGRESS  NOTE   ________________________________________________    CHIEF COMPLAINT:Patient is a 78y old  Male who presents with a chief complaint of abdominal pain/ rectal bleed (28 Aug 2023 11:44)  no complain  	  REVIEW OF SYSTEMS:  CONSTITUTIONAL: No fever, weight loss, or fatigue  EYES: No eye pain, visual disturbances, or discharge  ENT:  No difficulty hearing, tinnitus, vertigo; No sinus or throat pain  NECK: No pain or stiffness  RESPIRATORY: No cough, wheezing, chills or hemoptysis; No Shortness of Breath  CARDIOVASCULAR: No chest pain, palpitations, passing out, dizziness, or leg swelling  GASTROINTESTINAL: No abdominal or epigastric pain. No nausea, vomiting, or hematemesis; No diarrhea or constipation. No melena or hematochezia.  GENITOURINARY: No dysuria, frequency, hematuria, or incontinence  NEUROLOGICAL: No headaches, memory loss, loss of strength, numbness, or tremors  SKIN: No itching, burning, rashes, or lesions   LYMPH Nodes: No enlarged glands  ENDOCRINE: No heat or cold intolerance; No hair loss  MUSCULOSKELETAL: No joint pain or swelling; No muscle, back, or extremity pain  PSYCHIATRIC: No depression, anxiety, mood swings, or difficulty sleeping  HEME/LYMPH: No easy bruising, or bleeding gums  ALLERGY AND IMMUNOLOGIC: No hives or eczema	    [ x] All others negative	  [ ] Unable to obtain    PHYSICAL EXAM:  T(C): 36.9 (08-29-23 @ 04:39), Max: 37.1 (08-28-23 @ 23:26)  HR: 60 (08-29-23 @ 04:39) (57 - 76)  BP: 99/52 (08-29-23 @ 04:39) (93/53 - 122/73)  RR: 18 (08-29-23 @ 04:39) (18 - 18)  SpO2: 93% (08-29-23 @ 04:39) (92% - 95%)  Wt(kg): --  I&O's Summary    28 Aug 2023 07:01  -  29 Aug 2023 07:00  --------------------------------------------------------  IN: 1720 mL / OUT: 3250 mL / NET: -1530 mL        Appearance: Normal	  HEENT:   Normal oral mucosa, PERRL, EOMI	  Lymphatic: No lymphadenopathy  Cardiovascular: Normal S1 S2, No JVD, + murmurs, No edema  Respiratory: rhonchi  Psychiatry: A & O x 3, Mood & affect appropriate  Gastrointestinal:  Soft, Non-tender, + BS	  Skin: No rashes, No ecchymoses, No cyanosis	  Neurologic: Non-focal  Extremities: Normal range of motion, No clubbing, cyanosis or edema  Vascular: Peripheral pulses palpable 2+ bilaterally    MEDICATIONS  (STANDING):  apixaban 5 milliGRAM(s) Oral two times a day  epoetin val-epbx (RETACRIT) Injectable 4000 Unit(s) IV Push <User Schedule>  erythromycin   Ointment 1 Application(s) Left EYE three times a day  escitalopram 5 milliGRAM(s) Oral daily  hydrocortisone hemorrhoidal Suppository 1 Suppository(s) Rectal two times a day  levothyroxine 137 MICROGram(s) Oral daily  mesalamine Suppository 1000 milliGRAM(s) Rectal at bedtime  metoprolol succinate  milliGRAM(s) Oral daily  midodrine. 10 milliGRAM(s) Oral three times a day  pantoprazole    Tablet 40 milliGRAM(s) Oral before breakfast  polyethylene glycol 3350 17 Gram(s) Oral daily  senna 2 Tablet(s) Oral at bedtime  simvastatin 40 milliGRAM(s) Oral at bedtime      TELEMETRY: 	    ECG:  	  RADIOLOGY:  OTHER: 	  	  LABS:	 	    CARDIAC MARKERS:                        10.4   6.02  )-----------( 125      ( 28 Aug 2023 07:48 )             33.1     08-28    140  |  102  |  54<H>  ----------------------------<  89  4.2   |  25  |  3.33<H>    Ca    10.1      28 Aug 2023 07:46      proBNP:   Lipid Profile:   HgA1c:   TSH:     ·  Plan: Developed dialysis-depending BEULAH since April 2023 - progressed to ESRD now on (MWF) by temporary HD catheter.   Had AVF placed during hospital stay.      Assessment and plan  ---------------------------  Liam Garza is 79 y.o. M with PMHx Significant for HLD, A-fib, BPH, ESRD, CANDY, who presents to the ED with c.o. abd pain, rectal pain, and bloody stools.  Per patient, he has been experiencing abd pain and rectal bleeding onset 1 day ago.  Patient notes pain is localized to the suprapubic area, radiates to the RLQ.  He notes pain is intermittent and has had minimal improvement with Tylenol.  Nothing seems to exacerbate his pain.  His only other associated symptom is chills.  Otherwise he denies fevers, N/V, HA, changes in his vision, sore throat, SOB, CP, diarrhea or constipation. Blood per rectum is accumulated in his depends. Of note, patient notes rectal pain began in May which she describes as sharp and has been constant since onset.  pt with hx of chronic a.fib, chf systolic EF 35 to 40% s/p ppm ,MICRA sec to ppm exrtacction sec to + BC.  sob sec acute on chronic chf systolc, fluid overload  renal eval possible HD today  ?rectal bleed hold AC, GI eval  ASHD / A. FIb continue beta blocker   repeat echo with hx of pericardial effusion  GI/ MED appreciated  pulmonary edema/ chf, repeat echo check MV, continue HD with increase fluid withdrawal as tolerated  advance diet as  tolerated  doing better, oob to chair/ physical therapy  blood cultures are still +, ID appreciated awaiting ECHO concern about bioprosthetic MV, may need ERENDIRA/ id noted  if no improvement in Ef pt will need AICD in 3 months as per primary prevention  vascular consulted for  av matthew/ fistulae, appreciated /vein mapping  pt is clear cardiac wise for the vascular procedure today   will dc ACE sec to low bp and normal EF on this echo  will switch heparin too NOAC post procedure for AC sec to A.Fib  vascular appreciated, s/p dialysis catheter  Left eye pain, awaiting optho eval called several times  dc planning to rehab/ physical therapy

## 2023-08-29 NOTE — PROGRESS NOTE ADULT - ASSESSMENT
78  year old male    h/o  CAD s/p stent , asa.     A-fib/ PPM, , hypothyroid, and total left knee replacement    prior ppm  interrogation  with  WCT   Ct chest, retrosternal hematoma.  mod left pl effusion/ has   c/c leg  pain      admitted  with  abd pain and rectal bleeding onset 1 day ago.. arrived form n home     has  no pain  now    c/c   systolic  and  diastolic  chf,,      cardiomyopathy, Mitral  valvuloplasty,   h/o  c/c  l/edema  of L  leg    h/o    c/c AFIB,   has  PPM     h/o c/c   Anemia, , hb is  12.  gi   known to  dr joann INFANTE,     cath, with 2 vessel disease,  s/p  CABG and  MVR  surg d r marni      echo,  on 7/2022,  ef  35/ new/ severe  TR,  and ,  cath,   was non  obstructive     s/p   MSSA  bacterinia , in march 2023, completed  iv ancef..    s/p   explant pf  ppm  and  Micra  placement on  3/30/23. dr sunitha rocha      Echo,  3/2023,  ef  25,  mod  AI. severe  TR . RV hypokinesis    BEULAH, from  AIN, dx  recently , ancef / was  switched   to iv vanco,   CKD, on HD,  has  right Permcath .house  renal   s/p  renal bx on 4/ 14.. 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 + , Ig A  Lamda., known to heme  dr dobson.  pe r heme,  no  need  for  b marrow  bx     CT chest,  pl  effusion,   gi    f/p,  no  bleeding /and   nuclear  scan, no  bleeding   bacteremia   Clostridium cadeveris,  iv   ertapenem, / completed       AVF of  left arm     d/c  planning/  pt  wants  [place]/    pt has been  cleraed  for   d/c /  doing well         card /  dr mikayla dick       < from: Intra-Operative Transesophageal Echo W or WO Ultrasound Enhancing Agent (03.30.23 @ 12:48) >  --------------------------------------------------------------------------------  PRE-BYPASS FINDINGS  Left Ventricle:  Left ventricular ejection fraction is estimated at 25 to 30%. There is severe kypokinesis in the left ventricle.  Right Ventricle:  Moderately reduced systolic function. Moderately reduced systolic function. There is moderate hypokinesis in the right ventricle.  Left Atrium:  Diffuse smoke visualized in LA.  Aortic Valve:  The aortic valve appears trileaflet. There is moderate aortic regurgitation.  No obvious vegetations visualized  Mitral Valve:  Bioprosthetic valve is present in the mitral position. There is calcification of the mitral valve annulus.  No obvious vegetations visualized.  Tricuspid Valve:  There is moderate-severe tricuspid regurgitation. TR unchanged following lead extraction.  Pulmonic Valve:  There is mild pulmonic regurgitation.  Pericardium:  No pericardial effusion seen. No pericardial effusion visualized before and after lead extraction.  Electronically signed by Jimmy Gambino on 3/30/2023 at 3:00:58 PM  *** Final ***  < end of copied text >                                  78  year old male    h/o  CAD s/p stent , asa.     A-fib/ PPM, , hypothyroid, and total left knee replacement    prior ppm  interrogation  with  WCT   Ct chest, retrosternal hematoma.  mod left pl effusion/ has   c/c leg  pain      admitted  with  abd pain and rectal bleeding onset 1 day ago.. arrived form n home     has  no pain  now    c/c   systolic  and  diastolic  chf,,      cardiomyopathy, Mitral  valvuloplasty,   h/o  c/c  l/edema  of L  leg    h/o    c/c AFIB,   has  PPM     h/o c/c   Anemia, , hb is  12.  gi   known to  dr joann INFANTE,     cath, with 2 vessel disease,  s/p  CABG and  MVR  surg d r manri      echo,  on 7/2022,  ef  35/ new/ severe  TR,  and ,  cath,   was non  obstructive     s/p   MSSA  bacterinia , in march 2023, completed  iv ancef..    s/p   explant pf  ppm  and  Micra  placement on  3/30/23. dr sunitha rocha      Echo,  3/2023,  ef  25,  mod  AI. severe  TR . RV hypokinesis    BEULAH, from  AIN, dx  recently , ancef / was  switched   to iv vanco,   CKD, on HD,  has  right Permcath .house  renal   s/p  renal bx on 4/ 14.. 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 + , Ig A  Lamda., known to heme  dr dobson.  pe r heme,  no  need  for  b marrow  bx     CT chest,  pl  effusion,   gi    f/p,  no  bleeding /and   nuclear  scan, no  bleeding   bacteremia   Clostridium cadeveris,  iv   ertapenem, / completed      c/c  low  bp, on midodrine    AVF of  left arm     d/c  planning/  pt  wants  [place]/    pt has been  cleraed  for   d/c /  doing well         card /  dr mikayla dick       < from: Intra-Operative Transesophageal Echo W or WO Ultrasound Enhancing Agent (03.30.23 @ 12:48) >  --------------------------------------------------------------------------------  PRE-BYPASS FINDINGS  Left Ventricle:  Left ventricular ejection fraction is estimated at 25 to 30%. There is severe kypokinesis in the left ventricle.  Right Ventricle:  Moderately reduced systolic function. Moderately reduced systolic function. There is moderate hypokinesis in the right ventricle.  Left Atrium:  Diffuse smoke visualized in LA.  Aortic Valve:  The aortic valve appears trileaflet. There is moderate aortic regurgitation.  No obvious vegetations visualized  Mitral Valve:  Bioprosthetic valve is present in the mitral position. There is calcification of the mitral valve annulus.  No obvious vegetations visualized.  Tricuspid Valve:  There is moderate-severe tricuspid regurgitation. TR unchanged following lead extraction.  Pulmonic Valve:  There is mild pulmonic regurgitation.  Pericardium:  No pericardial effusion seen. No pericardial effusion visualized before and after lead extraction.  Electronically signed by Jimmy Gambino on 3/30/2023 at 3:00:58 PM  *** Final ***  < end of copied text >

## 2023-08-29 NOTE — PROGRESS NOTE ADULT - SUBJECTIVE AND OBJECTIVE BOX
[facility]  Thanh Michaels PA-C  [address] [place] [id number] [phone]    INTERVAL HPI/ OVERNIGHT EVENTS:  pt seen and examined, no new events  no abdominal pain, tolerating diet, no N/V/D      MEDICATIONS  (STANDING):  escitalopram 5 milliGRAM(s) Oral daily  hydrocortisone hemorrhoidal Suppository 1 Suppository(s) Rectal two times a day  levothyroxine 137 MICROGram(s) Oral daily  mesalamine Suppository 1000 milliGRAM(s) Rectal at bedtime  metoprolol succinate  milliGRAM(s) Oral daily  midodrine. 10 milliGRAM(s) Oral three times a day  pantoprazole    Tablet 40 milliGRAM(s) Oral before breakfast  piperacillin/tazobactam IVPB.. 3.375 Gram(s) IV Intermittent every 8 hours  polyethylene glycol 3350 17 Gram(s) Oral two times a day  senna 2 Tablet(s) Oral at bedtime  simvastatin 40 milliGRAM(s) Oral at bedtime    MEDICATIONS  (PRN):  aluminum hydroxide/magnesium hydroxide/simethicone Suspension 30 milliLiter(s) Oral every 6 hours PRN Dyspepsia  midodrine. 10 milliGRAM(s) Oral <User Schedule> PRN PRIOR TO HD  sodium chloride 0.9% Bolus. 100 milliLiter(s) IV Bolus every 5 minutes PRN SBP LESS THAN or EQUAL to 90 mmHg  escitalopram 5 milliGRAM(s) Oral daily  hydrocortisone hemorrhoidal Suppository 1 Suppository(s) Rectal two times a day  levothyroxine 137 MICROGram(s) Oral daily  mesalamine Suppository 1000 milliGRAM(s) Rectal at bedtime  metoprolol succinate  milliGRAM(s) Oral daily  PAST MEDICAL & SURGICAL HISTORY:  Afib  not on anticoagulation      CAD (coronary artery disease)      Varicose vein of leg      Hypothyroid      Pacemaker      H/O fracture of hip        H/O asbestosis      HTN (hypertension)      Pacemaker  [employer]tronic - Model RVDR01 1/10/2012      Stented coronary artery  drug eluding stent 2007      H/O detached retina repair        S/P cataract surgery      History of hip surgery  left hip ORIF      H/O varicose vein stripping   left leg      History of left knee replacement   - multiple infections post op requiring reoperation in , , )      H/O foot surgery  for infection of right foot 2019      H/O inguinal hernia repair  right       Family history:  No pertinent family history in first degree relatives    FH: skin cancer (Father)        Social History:   no etoh no cigs   no ivda    ROS:     General:  No wt loss, fevers, chills, night sweats, fatigue,   Eyes:  Good vision, no reported pain  ENT:  No sore throat, pain, runny nose, dysphagia  CV:  No pain, palpitations, hypo/hypertension  Resp:  No dyspnea, cough, tachypnea, wheezing  GI:  No pain, No nausea, No vomiting, No diarrhea, No constipation, No weight loss, No fever, No pruritis, No rectal bleeding, No tarry stools, No dysphagia,  :  No pain, bleeding, incontinence, nocturia  Muscle:  No pain, weakness  Neuro:  No weakness, tingling, memory problems  Psych:  No fatigue, insomnia, mood problems, depression  Endocrine:  No polyuria, polydipsia, cold/heat intolerance  Heme:  No petechiae, ecchymosis, easy bruisability  Skin:  No rash, tattoos, scars, edema      PHYSICAL EXAM:   Vital Signs Last 24 Hrs  T(C): 36.6 (29 Aug 2023 07:44), Max: 37.1 (28 Aug 2023 23:26)  T(F): 97.9 (29 Aug 2023 07:44), Max: 98.7 (28 Aug 2023 23:26)  HR: 63 (29 Aug 2023 07:44) (57 - 76)  BP: 96/57 (29 Aug 2023 07:44) (93/53 - 122/73)  BP(mean): --  RR: 18 (29 Aug 2023 07:44) (18 - 18)  SpO2: 94% (29 Aug 2023 07:44) (92% - 95%)    Parameters below as of 29 Aug 2023 07:44  Patient On (Oxygen Delivery Method): room air      Daily Height in cm: 170.18 (11 Aug 2023 00:36)    Daily     GENERAL:  Appears stated age, well-groomed, well-nourished, no distress  HEENT:  NC/AT,  conjunctivae clear and pink, no thyromegaly, nodules, adenopathy, no JVD, sclera -anicteric  CHEST:  Full & symmetric excursion, no increased effort, breath sounds clear  HEART:  Regular rhythm, S1, S2, no murmur/rub/S3/S4, no abdominal bruit, no edema  ABDOMEN:  Soft, non-tender, non-distended, normoactive bowel sounds,  no masses ,no hepato-splenomegaly, no signs of chronic liver disease  EXTEREMITIES:  no cyanosis,clubbing or edema  SKIN:  No rash/erythema/ecchymoses/petechiae/wounds/abscess/warm/dry  NEURO:  Alert, oriented, no asterixis, no tremor, no encephalopathy    LABS:                        10.4   6.02  )-----------( 125      ( 28 Aug 2023 07:48 )             33.1       140  |  102  |  54<H>  ----------------------------<  89  4.2   |  25  |  3.33<H>    Ca    10.1      28 Aug 2023 07:46        LIVER FUNCTIONS - ( 11 Aug 2023 01:23 )  Alb: 3.4 g/dL / Pro: 7.0 g/dL / ALK PHOS: 137 U/L / ALT: 12 U/L / AST: 19 U/L / GGT: x           PT/INR - ( 11 Aug 2023 01:23 )   PT: 23.5 sec;   INR: 2.29 ratio         PTT - ( 11 Aug 2023 01:23 )  PTT:36.4 sec  Urinalysis Basic - ( 11 Aug 2023 06:13 )    Color: Dark Yellow / Appearance: Turbid / S.017 / pH: x  Gluc: x / Ketone: Negative  / Bili: Small / Urobili: 3 mg/dL   Blood: x / Protein: 100 mg/dL / Nitrite: Positive   Leuk Esterase: Large / RBC: 463 /hpf /  /HPF   Sq Epi: x / Non Sq Epi: x / Bacteria: Many      Amylase Serum--      Lipase serum12       Ammonia--      Imaging:    < from: [facility] GI Bleed Localization (NM GI Bleed Localization .) (23 @ 17:40) >  ACC: [id number] EXAM:  NM GI BLEEDING IMG   ORDERED BY: JAYLENE PARKS     PROCEDURE DATE:  2023          INTERPRETATION:  RADIOPHARMACEUTICAL:  20.0 mCi Tc-99m labeled autologous   red blood cells, I.V.    CLINICAL INFORMATION: GI Bleeding referred for localization of bleeding   site.    TECHNIQUE: Dynamic imaging of the abdomen and pelvis was performed for 2   hours following administration of radiolabeled autologous red blood   cells. Static images of the abdomen and pelvis in the anterior and   lateral views were obtained immediately thereafter. An Anterior view of   the head/neck was obtained for  purposes.    COMPARISON: None.    FINDINGS: There is physiologic distribution of radiolabeled red cells in   the abdomen and pelvis. There is no abnormal focus of increased   radiotracer activity to suggest the presence of an active bleeding site.    IMPRESSION:    No evidence of active bleeding site.     These results were discussed with DASIA Parks NP, by Dr. BARBRA Natarajan via   telephone with read back at about 6:00 PM on 2023.    --- End of Report ---            HAM NATARAJAN MD; Attending Nuclear Medicine  This document has been electronically signed. Aug 11 2023  6:01PM    < end of copied text >

## 2023-08-29 NOTE — CONSULT NOTE ADULT - ASSESSMENT
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    1) Corneal epi defect, OS; ABMD OS  - patient with prior documented ABMD on outpatient note  - has been having eye pain on and off again for several weeks to months in the left eye  - IOP wnl, trace injection, no concern for angle closure   - ACIOL slightly displaced, but not impeding angle based on pressures  - dilated exam stable from prior visit with Dr. Hemphill  - recommend polytrim 1gtt 4x a day OS (oflox preferred however patient allergic to levaquin)  - recommend erythromycin ointment 2x a day OS  - continue artificial tears 4-6x a day OU  - abrasions may recur in setting of ABMD, important to keep eye well lubricated, if continually recurring may require BCL    Outpatient Follow-up: Patient should follow-up with his/her ophthalmologist or with [facility] Department of Ophthalmology within 1 week of after discharge at:    [address]  [place] [address]  [phone]    Magno Lin MD, PGY3  Also available on [employer] Teams

## 2023-08-29 NOTE — CONSULT NOTE ADULT - CONSULT REQUESTED DATE/TIME
11-Aug-2023
11-Aug-2023 15:08
11-Aug-2023 19:26
18-Aug-2023
11-Aug-2023 11:40
11-Aug-2023 11:41
11-Aug-2023 14:25
29-Aug-2023 11:36

## 2023-08-29 NOTE — CONSULT NOTE ADULT - PROVIDER SPECIALTY LIST ADULT
Colorectal Surgery
Ophthalmology
Vascular Surgery
Infectious Disease
Internal Medicine
Intervent Radiology
Vascular Surgery
Gastroenterology
Nephrology

## 2023-08-29 NOTE — CONSULT NOTE ADULT - SUBJECTIVE AND OBJECTIVE BOX
Chief Complaint:  Patient is a 78y old  Male who presents with a chief complaint of abdominal pain/ rectal bleed has stercoral colitis also   large stool burden has been seeing outpt gi and surgery for this  unable to ambulate also  History of Present Illness:   Date of Service, 23 @ 08:31  CHIEF COMPLAINT:Patient is a 78y old  Male who presents with a chief complaint of  SOB    HPI:  Liam Garza is 79 y.o. M with PMHx Significant for HLD, A-fib, BPH, ESRD, CANDY, who presents to the ED with c.o. abd pain, rectal pain, and bloody stools.  Per patient, he has been experiencing abd pain and rectal bleeding onset 1 day ago.  Patient notes pain is localized to the suprapubic area, radiates to the RLQ.  He notes pain is intermittent and has had minimal improvement with Tylenol.  Nothing seems to exacerbate his pain.  His only other associated symptom is chills.  Otherwise he denies fevers, N/V, HA, changes in his vision, sore throat, SOB, CP, diarrhea or constipation. Blood per rectum is accumulated in his depends. Of note, patient notes rectal pain began in May which she describes as sharp and has been constant since onset.    PAST MEDICAL & SURGICAL HISTORY:  Afib  not on anticoagulation  CAD (coronary artery disease)  Varicose vein of leg  Hypothyroid  Pacemaker  H/O fracture of hip    H/O asbestosis  HTN (hypertension)  Stented coronary artery  drug eluding stent 2007  H/O detached retina repair  S/P cataract surgery  History of hip surgery  left hip ORIF  H/O varicose vein stripping   left leg  History of left knee replacement   - multiple infections post op requiring reoperation in , , )  H/O foot surgery  for infection of right foot 2019  H/O inguinal hernia repair  Allergies:  Myrbetriq (Hives)  tamsulosin (Hives)  alfuzosin (Hives)  levofloxacin (Hives)      Medications:  cefTRIAXone   IVPB 1000 milliGRAM(s) IV Intermittent every 24 hours  escitalopram 5 milliGRAM(s) Oral daily  levothyroxine 137 MICROGram(s) Oral daily  metoprolol succinate  milliGRAM(s) Oral daily  midodrine. 10 milliGRAM(s) Oral three times a day  pantoprazole    Tablet 40 milliGRAM(s) Oral before breakfast  simvastatin 40 milliGRAM(s) Oral at bedtime      PMHX/PSHX:  Afib    CAD (coronary artery disease)    Varicose vein of leg    Hypothyroid    Pacemaker    H/O fracture of hip    H/O asbestosis    HTN (hypertension)    History of total left knee replacement (TKR)    Pacemaker    Stented coronary artery    H/O detached retina repair    S/P cataract surgery    History of hip surgery    H/O varicose vein stripping    History of left knee replacement    H/O foot surgery    H/O inguinal hernia repair        Family history:  No pertinent family history in first degree relatives    FH: skin cancer (Father)        Social History:   no etoh no cigs   no ivda    ROS:     General:  No wt loss, fevers, chills, night sweats, fatigue,   Eyes:  Good vision, no reported pain  ENT:  No sore throat, pain, runny nose, dysphagia  CV:  No pain, palpitations, hypo/hypertension  Resp:  No dyspnea, cough, tachypnea, wheezing  GI:  No pain, No nausea, No vomiting, No diarrhea, No constipation, No weight loss, No fever, No pruritis, No rectal bleeding, No tarry stools, No dysphagia,  :  No pain, bleeding, incontinence, nocturia  Muscle:  No pain, weakness  Neuro:  No weakness, tingling, memory problems  Psych:  No fatigue, insomnia, mood problems, depression  Endocrine:  No polyuria, polydipsia, cold/heat intolerance  Heme:  No petechiae, ecchymosis, easy bruisability  Skin:  No rash, tattoos, scars, edema      PHYSICAL EXAM:   Vital Signs:  Vital Signs Last 24 Hrs  T(C): 36.9 (11 Aug 2023 06:15), Max: 36.9 (11 Aug 2023 06:15)  T(F): 98.4 (11 Aug 2023 06:15), Max: 98.4 (11 Aug 2023 06:15)  HR: 77 (11 Aug 2023 06:15) (60 - 77)  BP: 128/62 (11 Aug 2023 06:15) (128/62 - 160/78)  BP(mean): --  RR: 20 (11 Aug 2023 06:15) (18 - 20)  SpO2: 96% (11 Aug 2023 06:15) (88% - 96%)    Parameters below as of 11 Aug 2023 06:15  Patient On (Oxygen Delivery Method): nasal cannula  O2 Flow (L/min): 3    Daily Height in cm: 170.18 (11 Aug 2023 00:36)    Daily     GENERAL:  Appears stated age, well-groomed, well-nourished, no distress  HEENT:  NC/AT,  conjunctivae clear and pink, no thyromegaly, nodules, adenopathy, no JVD, sclera -anicteric  CHEST:  Full & symmetric excursion, no increased effort, breath sounds clear  HEART:  Regular rhythm, S1, S2, no murmur/rub/S3/S4, no abdominal bruit, no edema  ABDOMEN:  Soft, non-tender, non-distended, normoactive bowel sounds,  no masses ,no hepato-splenomegaly, no signs of chronic liver disease  EXTEREMITIES:  no cyanosis,clubbing or edema  SKIN:  No rash/erythema/ecchymoses/petechiae/wounds/abscess/warm/dry  NEURO:  Alert, oriented, no asterixis, no tremor, no encephalopathy    LABS:                        12.4   7.14  )-----------( 152      ( 11 Aug 2023 01:23 )             40.3     08    138  |  100  |  39<H>  ----------------------------<  81  3.4<L>   |  24  |  2.33<H>    Ca    10.6<H>      11 Aug 2023 01:23  Mg     1.7         TPro  7.0  /  Alb  3.4  /  TBili  0.5  /  DBili  x   /  AST  19  /  ALT  12  /  AlkPhos  137<H>      LIVER FUNCTIONS - ( 11 Aug 2023 01:23 )  Alb: 3.4 g/dL / Pro: 7.0 g/dL / ALK PHOS: 137 U/L / ALT: 12 U/L / AST: 19 U/L / GGT: x           PT/INR - ( 11 Aug 2023 01:23 )   PT: 23.5 sec;   INR: 2.29 ratio         PTT - ( 11 Aug 2023 01:23 )  PTT:36.4 sec  Urinalysis Basic - ( 11 Aug 2023 06:13 )    Color: Dark Yellow / Appearance: Turbid / S.017 / pH: x  Gluc: x / Ketone: Negative  / Bili: Small / Urobili: 3 mg/dL   Blood: x / Protein: 100 mg/dL / Nitrite: Positive   Leuk Esterase: Large / RBC: 463 /hpf /  /HPF   Sq Epi: x / Non Sq Epi: x / Bacteria: Many      Amylase Serum--      Lipase serum12       Ammonia--      Imaging:          
[facility] DEPARTMENT OF OPHTHALMOLOGY - INITIAL ADULT CONSULT  -----------------------------------------------------------------------------------------------------------------  Magno Lin MD, PGY3  Available on [employer]  -----------------------------------------------------------------------------------------------------------------    HPI:  Date of Service, 08-11-23 @ 08:31  CHIEF COMPLAINT:Patient is a 78y old  Male who presents with a chief complaint of  SOB    HPI:  Liam Garza is 79 y.o. M with PMHx Significant for HLD, A-fib, BPH, ESRD, CANDY, who presents to the ED with c.o. abd pain, rectal pain, and bloody stools.  Per patient, he has been experiencing abd pain and rectal bleeding onset 1 day ago.  Patient notes pain is localized to the suprapubic area, radiates to the RLQ.  He notes pain is intermittent and has had minimal improvement with Tylenol.  Nothing seems to exacerbate his pain.  His only other associated symptom is chills.  Otherwise he denies fevers, N/V, HA, changes in his vision, sore throat, SOB, CP, diarrhea or constipation. Blood per rectum is accumulated in his depends. Of note, patient notes rectal pain began in May which she describes as sharp and has been constant since onset.    PAST MEDICAL & SURGICAL HISTORY:  Afib  not on anticoagulation  CAD (coronary artery disease)  Varicose vein of leg  Hypothyroid  Pacemaker  H/O fracture of hip  2017  H/O asbestosis  HTN (hypertension)  Stented coronary artery  drug eluding stent 5/2007  H/O detached retina repair  S/P cataract surgery  History of hip surgery  left hip ORIF  H/O varicose vein stripping  1993 left leg  History of left knee replacement  2013 - multiple infections post op requiring reoperation in 2015, 2017, 2019)  H/O foot surgery  for infection of right foot 2019  H/O inguinal hernia repair         (11 Aug 2023 08:31)    Interval History: patient is consulted for ophthalmology due to pain in the left eye. Patient states that he has been having pain in the left eye for several weeks. Had trauma when 7 years old resulting in retinal detachment and near complete loss of vision. Follows outpatient with Dr. Hemphill. Patient vision has been stable. Denies flashes, floaters, diplopia, discharge.    Past Medical History: as above  Past Ocular History: anisocoria, iris sphincter tears OS, SALONI, PVD OU, optic atrophy OS, ABM dystrophy OS, guttata OS, stromal scarring OS  Drops: ATs, previously on maxitrol for chalazion  Medications: see primary note  Allergies: alfuzosin, levofloxacin, Myrbetriq, tamsulosin  Family History: denies eye history  Surgical History: CE/PCIOL OD, ACIOL OS, PPV w/ scleral buckle OS  Outpatient Ophthalmologist: Dr. Hemphill      Review of Systems:  Constitutional: No fever, chills  Eyes: +eye pain OS, denies blurry vision, flashes, floaters, FBS, erythema, discharge, double vision, OU  Neuro: No tremors  Cardiovascular: No chest pain, palpitations  Respiratory: No SOB, no cough  GI: No nausea, vomiting, abdominal pain    Vital Signs: T(C): 36.6 (08-29-23 @ 07:44)  T(F): 97.9 (08-29-23 @ 07:44), Max: 98.7 (08-28-23 @ 23:26)  HR: 63 (08-29-23 @ 07:44) (57 - 76)  BP: 96/57 (08-29-23 @ 07:44) (93/53 - 122/73)  RR:  (18 - 18)  SpO2:  (92% - 95%)  Wt(kg): --  AAOx4    Ophthalmology Exam:  Visual acuity (cc): 20/20 OD. HM OS (stable from last exam with Dr. Thomas in March 2023)  Pupils: right pupil round and reactive, 2+ APD OS, surgical pupil  Intraocular Pressure:  Ttono 13 OD, 14 OS  Extraocular movements (EOMs): Full OU, no pain, no diplopia. sensory exotropia OS  Confrontational Visual Field (CVF): Full OD. ARIANE 2/2 VA OS    Pen Light Exam (PLE)  External: Normal OU.  Lids/Lashes/Lacrimal Ducts: Flat OU.  Sclera/Conjunctiva: White and quiet OD. trace injection OS  Cornea: Clear OD. corneal epi defect about 1x2mm with trace stromal scarring  Anterior Chamber: Deep and formed OU.    Iris: Flat OD. irregular shape, sphincter tears OS  Lens: PCIOL OD, ACIOL OS    Fundus Exam: dilated with 1% tropicamide and 2.5% phenylephrine  Approval obtained from primary team for dilation  Patient aware that pupils can remained dilated for at least 4-6 hours.  Exam performed with 20 D lens    Vitreous: PVD OU  Disc, cup/disc: sharp and pink, 0.5 OD, optic atrophy OS PPA OU  Macula: wnl OU  Vessels: wnl OU  Periphery: wnl OD, encircling scleral buckle, CRS OS    Labs/Imaging:    
 79 m with HLD, A-fib, BPH, ESRD on HD via R permacath, recent MSSA bacteremia 3/07075 s/p PPM extraction and Micra now p/w rectal bleed and loss of appetite, no fever but stated that has had dysuria for a while I  here afebrile, WBC: 7 Hgb: 12  u/a positive  CT: Abundant fecal material within the rectum with mild wall thickening and trace surrounding stranding. Recommend clinical correlation to assess for stercoral colitis. No bowel obstruction. Cholelithiasis. Findings of pulmonary edema with moderate right and small left pleural   effusion.            PAST MEDICAL & SURGICAL HISTORY:  Afib  not on anticoagulation      CAD (coronary artery disease)      Varicose vein of leg      Hypothyroid      Pacemaker      H/O fracture of hip        H/O asbestosis      HTN (hypertension)      Pacemaker  Medtronic - Model RVDR01 1/10/2012      Stented coronary artery  drug eluding stent 2007      H/O detached retina repair        S/P cataract surgery      History of hip surgery  left hip ORIF      H/O varicose vein stripping   left leg      History of left knee replacement   - multiple infections post op requiring reoperation in , , )      H/O foot surgery  for infection of right foot 2019      H/O inguinal hernia repair  right           Allergies    Myrbetriq (Hives)  tamsulosin (Hives)  alfuzosin (Hives)  levofloxacin (Hives)    Intolerances        ANTIMICROBIALS:  cefTRIAXone   IVPB 1000 every 24 hours      OTHER MEDS:  escitalopram 5 milliGRAM(s) Oral daily  levothyroxine 137 MICROGram(s) Oral daily  metoprolol succinate  milliGRAM(s) Oral daily  midodrine. 10 milliGRAM(s) Oral three times a day  pantoprazole    Tablet 40 milliGRAM(s) Oral before breakfast  simvastatin 40 milliGRAM(s) Oral at bedtime      SOCIAL HISTORY:  lives alone  no smoking, alcohol or drug abuse  no recent travel    FAMILY HISTORY:  FH: skin cancer (Father)        ROS:    All other systems negative     Constitutional: no fever, no chills, poor appetite for a few days  Eye: no eye pain, no redness, no vision changes  ENT:  no sore throat, no rhinorrhea  Cardiovascular:  no chest pain, no palpitation  Respiratory:  no SOB, no cough  GI:  some abd pain, no vomiting, no diarrhea, +rectal bleed  urinary: dysuria, no hematuria, no flank pain  : no penile discharge or bleeding  musculoskeletal:  no joint swelling  skin:  no rash  neurology:  no headache, no seizure, no change in mental status  psych: no anxiety, no depression     Physical Exam:    General:    NAD, non toxic  Head: atraumatic, normocephalic  Eyes: L eye smaller with dilated pupil  ENT:   no oropharyngeal lesions, no LAD, neck supple  Cardio:   S1,S2  Respiratory:   clear b/l, no wheezing  abd:   soft, BS +, not tender  :     no CVAT, no suprapubic tenderness, no delgadillo  Musculoskeletal : no joint swelling, no edema  Skin:   petechial rash on torose and UE  vascular: R permacath  Neurologic:     no focal deficits  psych: normal affect      Drug Dosing Weight  Height (cm): 170.2 (11 Aug 2023 00:36)  Weight (kg): 81.6 (11 Aug 2023 00:36)  BMI (kg/m2): 28.2 (11 Aug 2023 00:36)  BSA (m2): 1.93 (11 Aug 2023 00:36)    Vital Signs Last 24 Hrs  T(F): 98.4 (23 @ 06:15), Max: 98.4 (23 @ 06:15)    Vital Signs Last 24 Hrs  HR: 77 (23 @ 06:15) (60 - 77)  BP: 128/62 (23 @ 06:15) (128/62 - 160/78)  RR: 20 (23 @ 06:15)  SpO2: 96% (23 @ 06:15) (88% - 96%)  Wt(kg): --                          12.4   7.14  )-----------( 152      ( 11 Aug 2023 01:23 )             40.3           138  |  100  |  39<H>  ----------------------------<  81  3.4<L>   |  24  |  2.33<H>    Ca    10.6<H>      11 Aug 2023 01:23  Mg     1.7         TPro  7.0  /  Alb  3.4  /  TBili  0.5  /  DBili  x   /  AST  19  /  ALT  12  /  AlkPhos  137<H>  08-11      Urinalysis Basic - ( 11 Aug 2023 06:13 )    Color: Dark Yellow / Appearance: Turbid / S.017 / pH: x  Gluc: x / Ketone: Negative  / Bili: Small / Urobili: 3 mg/dL   Blood: x / Protein: 100 mg/dL / Nitrite: Positive   Leuk Esterase: Large / RBC: 463 /hpf /  /HPF   Sq Epi: x / Non Sq Epi: x / Bacteria: Many        MICROBIOLOGY:  v      Rapid RVP Result: NotDetec ( @ 01:27)          RADIOLOGY:    Images independently visualized and reviewed personally,  findings as below    < from: CT Abdomen and Pelvis w/ IV Cont (23 @ 04:32) >  IMPRESSION:    Abundant fecal material within the rectum with mild wall thickening and   trace surrounding stranding. Recommend clinical correlation to assess for   stercoral colitis. No bowel obstruction.    Cholelithiasis.    Findings of pulmonary edema with moderate right and small left pleural   effusion.    < end of copied text >  < from: Xray Chest 1 View AP/PA (23 @ 01:32) >    New right pleural effusion, with a subjacent atelectasis. No free air   underneath the diaphragm.    < end of copied text >  
VASCULAR SURGERY CONSULT NOTE  --------------------------------------------------------------------------------------------  Patient is a 78y old  Male who presents with a chief complaint of abdominal pain/ rectal bleed (18 Aug 2023 17:51)    HPI:  Liam Garza is 79 y.o. M with PMHx Significant for HLD, A-fib, BPH, ESRD, CANDY, who presents to the ED with c.o. abd pain, rectal pain, and bloody stools.  Per patient, he has been experiencing abd pain and rectal bleeding onset 1 day ago.  Patient notes pain is localized to the suprapubic area, radiates to the RLQ.  He notes pain is intermittent and has had minimal improvement with Tylenol.  Nothing seems to exacerbate his pain.  His only other associated symptom is chills.  Otherwise he denies fevers, N/V, HA, changes in his vision, sore throat, SOB, CP, diarrhea or constipation. Blood per rectum is accumulated in his depends. Of note, patient notes rectal pain began in May which she describes as sharp and has been constant since onset. Patient is diagnosed with pyelonephritis and scheduled for dialysis on MWF. Vascular surgery is consulted for AVF creation.     PAST MEDICAL & SURGICAL HISTORY:  Afib  not on anticoagulation  CAD (coronary artery disease)  Varicose vein of leg  Hypothyroid  Pacemaker  H/O fracture of hip  2017  H/O asbestosis  HTN (hypertension)  Stented coronary artery  drug eluding stent 5/2007  H/O detached retina repair  S/P cataract surgery  History of hip surgery  left hip ORIF  H/O varicose vein stripping  1993 left leg  History of left knee replacement  2013 - multiple infections post op requiring reoperation in 2015, 2017, 2019)  H/O foot surgery  for infection of right foot 2019  H/O inguinal hernia repair      MEDICATIONS  (STANDING):  vancomycin 500 mg/100 mL intravenous solution: 500 milligram(s) intravenously Monday, Wednesday, and Friday TO BE GIVEN ON POST HD DAYS UNTIL 5/12  · 	midodrine 10 mg oral tablet: 1 tab(s) orally 3 times a day  · 	apixaban 5 mg oral tablet: 1 tab(s) orally every 12 hours  · 	nystatin 100,000 units/g topical cream: 1 application topically 2 times a day  · 	hydrocortisone 1% topical cream: 1 application topically once a day  · 	clotrimazole 1% topical cream: 1 application topically 2 times a day  · 	metoprolol succinate 100 mg oral tablet, extended release: 1 tab(s) orally once a day  · 	ferrous sulfate 325 mg (65 mg elemental iron) oral tablet: 1 tab(s) orally once a day  · 	folic acid 1 mg oral tablet: 1 tab(s) orally once a day  · 	escitalopram 5 mg oral tablet: 1 tab(s) orally once a day  · 	Synthroid 137 mcg (0.137 mg) oral tablet: 1 tab(s) orally once a day  · 	Vitamin D3 2000 intl units (50 mcg) oral tablet: 1  orally once a day  · 	aspirin 81 mg oral tablet: 1  orally once a day  · 	Vytorin 10 mg-40 mg oral tablet: 1 tab(s) orally once a day  MEDICATIONS  (PRN):      FAMILY HISTORY:  FH: skin cancer (Father)        SOCIAL HISTORY:    [ ] Non-smoker  [ ] Smoker  [ ] Alcohol    Allergies    Myrbetriq (Hives)  tamsulosin (Hives)  alfuzosin (Hives)  levofloxacin (Hives)    Intolerances    	  REVIEW OF SYSTEMS:  CONSTITUTIONAL: No fever, weight loss, or fatigue  EYES: No eye pain, visual disturbances, or discharge  ENT:  No difficulty hearing, tinnitus, vertigo; No sinus or throat pain  NECK: No pain or stiffness  RESPIRATORY: No cough, wheezing, chills or hemoptysis; No Shortness of Breath  CARDIOVASCULAR: No chest pain, palpitations, passing out, dizziness, or leg swelling  GASTROINTESTINAL: No abdominal or epigastric pain. No nausea, vomiting, or hematemesis; No diarrhea or constipation. + bleed  GENITOURINARY: No dysuria, frequency, hematuria, or incontinence  NEUROLOGICAL: No headaches, memory loss, loss of strength, numbness, or tremors  SKIN: No itching, burning, rashes, or lesions   LYMPH Nodes: No enlarged glands  ENDOCRINE: No heat or cold intolerance; No hair loss  MUSCULOSKELETAL: No joint pain or swelling; No muscle, back, or extremity pain  PSYCHIATRIC: No depression, anxiety, mood swings, or difficulty sleeping  HEME/LYMPH: No easy bruising, or bleeding gums  ALLERGY AND IMMUNOLOGIC: No hives or eczema	    [x ] All others negative	  [ ] Unable to obtain    Vital Signs:  T(C): 36.9 (08-11-23 @ 06:15), Max: 36.9 (08-11-23 @ 06:15)  HR: 77 (08-11-23 @ 06:15) (60 - 77)  BP: 128/62 (08-11-23 @ 06:15) (128/62 - 160/78)  RR: 20 (08-11-23 @ 06:15) (18 - 20)  SpO2: 96% (08-11-23 @ 06:15) (88% - 96%)  Wt(kg): --  I&O's Summary    Physical Exam:  Appearance: Normal	  HEENT:   Normal oral mucosa, PERRL, EOMI	  Lymphatic: No lymphadenopathy  Cardiovascular: Normal S1 S2, No JVD, + murmurs, No edema  Respiratory:rhonchi  Psychiatry: A & O x 3, Mood & affect appropriate  Gastrointestinal:  Soft, + mildly tender, + BS	  Skin: No rashes, No ecchymoses, No cyanosis	  Neurologic: Non-focal  Extremities: Normal range of motion, No clubbing, cyanosis or edema  Vascular: + pvd                        12.4   7.14  )-----------( 152      ( 11 Aug 2023 01:23 )             40.3     08-11    138  |  100  |  39<H>  ----------------------------<  81  3.4<L>   |  24  |  2.33<H>    Ca    10.6<H>      11 Aug 2023 01:23  Mg     1.7     08-11    TPro  7.0  /  Alb  3.4  /  TBili  0.5  /  DBili  x   /  AST  19  /  ALT  12  /  AlkPhos  137<H>  08-11    proBNP:   Lipid Profile:   HgA1c:   TSH:   PT/INR - ( 11 Aug 2023 01:23 )   PT: 23.5 sec;   INR: 2.29 ratio         PTT - ( 11 Aug 2023 01:23 )  PTT:36.4 sec    PREVIOUS DIAGNOSTIC TESTING:    · ST/T Wave: twi aVL and V2, changed from March 2023  · QTC: 470  · QRS: 112  · Acute or Evolving MI?: no   · Abnormal Rhythm: atrial fibrillation   · Interpretation: abnormal   · Rate: 63   · EKG Date/Time: 11-Aug-2023 01:32      < from: CT Abdomen and Pelvis w/ IV Cont (08.11.23 @ 04:32) >  Abundant fecal material within the rectum with mild wall thickening and   trace surrounding stranding. Recommend clinical correlation to assess for   stercoral colitis. No bowel obstruction.    Cholelithiasis.    Findings of pulmonary edema with moderate right and small left pleural   effusion.  < from: TTE W or WO Ultrasound Enhancing Agent (03.29.23 @ 14:59) >   1. Mildly dilated left ventricular cavity size. The left ventricular wall thickness is normal. The left ventricular systolic function is moderately decreased with an ejection fraction visually estimated at 35 to 40 %.   2. The left ventricular diastolic function is indeterminate.   3. Normal right ventricular cavity size, normal right ventricular wall thickness and mildly reduced systolic function. The tricuspid annular plane systolic excursion (TAPSE) is 0.9 cm (normal >=1.7 cm).   4. The left atrium is severely dilated.   5. Bioprosthetic valve present in the mitral position. No prosthetic valvular regurgitation.   6. The aortic annulus and aortic root appear normal in size. The aortic root at sinuses of Valsalva is borderline dilated.   7. No pericardial effusion seen.   8. Unable to rule out endocarditis.   9. Compared to the transthoracic echocardiogram performed on 7/5/2022 there have been no significant interval changes.  10. Estimated pulmonary artery systolic pressure is 37 mmHg.  11. No echocardiographic evidence of vegetations.             (11 Aug 2023 08:31)      ROS: 10-system review is otherwise negative except HPI above.      PAST MEDICAL & SURGICAL HISTORY:  Afib  not on anticoagulation      CAD (coronary artery disease)      Varicose vein of leg      Hypothyroid      Pacemaker      H/O fracture of hip  2017      H/O asbestosis      HTN (hypertension)      Pacemaker  Medtronic - Model RVDR01 1/10/2012      Stented coronary artery  drug eluding stent 5/2007      H/O detached retina repair  1952      S/P cataract surgery      History of hip surgery  left hip ORIF      H/O varicose vein stripping  1993 left leg      History of left knee replacement  2013 - multiple infections post op requiring reoperation in 2015, 2017, 2019)      H/O foot surgery  for infection of right foot 2019      H/O inguinal hernia repair  right 1993        FAMILY HISTORY:  FH: skin cancer (Father)        SOCIAL HISTORY:      ALLERGIES: Myrbetriq (Hives)  tamsulosin (Hives)  alfuzosin (Hives)  levofloxacin (Hives)      HOME MEDICATIONS:     CURRENT MEDICATIONS  MEDICATIONS (STANDING): ertapenem  IVPB 500 milliGRAM(s) IV Intermittent every 24 hours  escitalopram 5 milliGRAM(s) Oral daily  heparin  Infusion 1200 Unit(s)/Hr IV Continuous <Continuous>  levothyroxine 137 MICROGram(s) Oral daily  lisinopril 2.5 milliGRAM(s) Oral daily  mesalamine Suppository 1000 milliGRAM(s) Rectal at bedtime  metoprolol succinate  milliGRAM(s) Oral daily  midodrine. 10 milliGRAM(s) Oral three times a day  pantoprazole    Tablet 40 milliGRAM(s) Oral before breakfast  polyethylene glycol 3350 17 Gram(s) Oral daily  senna 2 Tablet(s) Oral at bedtime  simvastatin 40 milliGRAM(s) Oral at bedtime    MEDICATIONS (PRN):aluminum hydroxide/magnesium hydroxide/simethicone Suspension 30 milliLiter(s) Oral every 6 hours PRN Dyspepsia  midodrine. 20 milliGRAM(s) Oral <User Schedule> PRN PRIOR TO HD  sodium chloride 0.9% Bolus. 100 milliLiter(s) IV Bolus every 5 minutes PRN SBP LESS THAN or EQUAL to 90 mmHg    --------------------------------------------------------------------------------------------    Vitals:   T(C): 36.6 (08-18-23 @ 16:21), Max: 37 (08-17-23 @ 19:57)  HR: 81 (08-18-23 @ 16:21) (57 - 86)  BP: 130/62 (08-18-23 @ 16:21) (125/69 - 158/79)  RR: 18 (08-18-23 @ 16:21) (18 - 18)  SpO2: 98% (08-18-23 @ 16:21) (94% - 98%)  CAPILLARY BLOOD GLUCOSE        CAPILLARY BLOOD GLUCOSE          08-17 @ 07:01  -  08-18 @ 07:00  --------------------------------------------------------  IN:    Heparin: 73 mL    Oral Fluid: 480 mL  Total IN: 553 mL    OUT:    Indwelling Catheter - Urethral (mL): 350 mL  Total OUT: 350 mL    Total NET: 203 mL      08-18 @ 07:01  -  08-18 @ 18:21  --------------------------------------------------------  IN:    Oral Fluid: 600 mL  Total IN: 600 mL    OUT:    Indwelling Catheter - Urethral (mL): 325 mL  Total OUT: 325 mL    Total NET: 275 mL            PHYSICAL EXAM:   General: NAD, Lying in bed comfortably  Neuro: A+Ox3  HEENT: NC/AT, EOMI  Neck: Soft, supple  Cardio: RRR, nml S1/S2  Resp: Good effort, CTA b/l  GI/Abd: Soft, NT/ND, no rebound/guarding, no masses palpated  Vascular:   Skin: Intact, no breakdown  Lymphatic/Nodes: No palpable lymphadenopathy  Musculoskeletal: All 4 extremities moving spontaneously, no limitations.  --------------------------------------------------------------------------------------------    LABS  CBC (08-18 @ 07:40)                              11.1<L>                         5.45    )----------------(  136<L>     --    % Neutrophils, --    % Lymphocytes, ANC: --                                  36.1<L>  CBC (08-17 @ 11:10)                              11.4<L>                         5.07    )----------------(  137<L>     --    % Neutrophils, --    % Lymphocytes, ANC: --                                  37.1<L>    BMP (08-18 @ 07:40)             135     |  99      |  25<H> 		Ca++ --      Ca 9.8                ---------------------------------( 85    		Mg --                 3.9     |  26      |  3.02<H>			Ph --      BMP (08-17 @ 11:10)             135     |  97      |  14    		Ca++ --      Ca 9.9                ---------------------------------( 97    		Mg 1.7                3.8     |  29      |  2.03<H>			Ph 2.4<L>      Coags (08-18 @ 16:13)  aPTT 49.3<H> / INR -- / PT --  Coags (08-18 @ 08:42)  aPTT 61.2<H> / INR -- / PT --          --------------------------------------------------------------------------------------------    MICROBIOLOGY  Urinalysis (08-18 @ 07:40):     Color:  / Appearance:  / SG:  / pH:  / Gluc: 85 / Ketones:  / Bili:  / Urobili:  / Protein : / Nitrites:  / Leuk.Est:  / RBC:  / WBC:  / Sq Epi:  / Non Sq Epi:  / Bacteria        -> .Blood Blood-Peripheral Culture (08-14 @ 23:02)     NG    NG    No growth at 72 Hours    -> .Blood Blood-Peripheral Culture (08-14 @ 22:09)     NG    NG    No growth at 72 Hours    -> .Blood Blood Culture (08-13 @ 06:45)       Growth in aerobic bottle: Gram Positive Cocci in Clusters  Growth in anaerobic bottle: Gram Positive Cocci in Clusters    Blood Culture PCR    Growth in aerobic and anaerobic bottles: Staphylococcus epidermidis  Coagulase Negative Staphylococci isolated from a single blood culture set  may represent contamination.  Contact the Microbiology Department at [phone] if susceptibility  testing is clinically indicated.  Direct identification is available within approximately 3-5  hours either by Blood Panel Multiplexed PCR or Direct  MALDI-TOF. Details: [email or web address]    -> Clean Catch Clean Catch (Midstream) Culture (08-11 @ 06:13)     NG    Klebsiella pneumoniae ESBL    >100,000 CFU/ml Klebsiella pneumoniae ESBL    -> .Blood Blood-Venous Culture (08-11 @ 00:55)       Growth in anaerobic bottle: Gram Variable Rods    Blood Culture PCR    Growth in anaerobic bottle: Clostridium cadaveris "Susceptibilities not  performed"  Direct identification is available within approximately 3-5  hours either by Blood Panel Multiplexed PCR or Direct  MALDI-TOF. Details: [email or web address]    -> .Blood Blood-Venous Culture (08-11 @ 00:45)       Growth in anaerobic bottle: Gram Positive Rods    Blood Culture PCR    Growth in anaerobic bottle: Gram Positive Rods  Direct identification is available within approximately 3-5  hours either by Blood Panel Multiplexed PCR or Direct  MALDI-TOF. Details: [email or web address]      --------------------------------------------------------------------------------------------    IMAGING    
GENERAL SURGERY CONSULT NOTE  --------------------------------------------------------------------------------------------  79 y.o. M with PMHx Significant for HLD, A-fib on xarelto, BPH, ESRD, CANDY, who presents to the ED with c.o. abd pain, rectal pain, and bloody stools. Per patient, he has been experiencing abd pain and rectal bleeding onset 1 day ago. Patient notes pain is localized to the suprapubic area, radiates to the RLQ. He notes pain is intermittent and has had minimal improvement with Tylenol. Nothing seems to exacerbate his pain. His only other associated symptom is chills. Otherwise he denies fevers, N/V, HA, changes in his vision, sore throat, SOB, CP, diarrhea or constipation. Blood per rectum is accumulated in his depends. Of note, patient notes rectal pain began in May which he describes as sharp and has been constant since onset.    Patient is known to colorectal surgery. Has been seen in the past for rectal bleeding with recommendation of IR for internal hemorrhoid artery embolization due to xarelto. However, patient now presents with constipation and CT w/ findings of fecal impaction and proctocolitis.      In the ED, patient is afebrile, VSS. H/H 12.4/40.3.      PAST MEDICAL & SURGICAL HISTORY:  Afib  not on anticoagulation      CAD (coronary artery disease)      Varicose vein of leg      Hypothyroid      Pacemaker      H/O fracture of hip        H/O asbestosis      HTN (hypertension)      Pacemaker  Medtronic - Model RVDR01 1/10/2012      Stented coronary artery  drug eluding stent 2007      H/O detached retina repair        S/P cataract surgery      History of hip surgery  left hip ORIF      H/O varicose vein stripping   left leg      History of left knee replacement   - multiple infections post op requiring reoperation in , , )      H/O foot surgery  for infection of right foot 2019      H/O inguinal hernia repair  right         FAMILY HISTORY:  FH: skin cancer (Father)    [] Family history not pertinent as reviewed with the patient and family    SOCIAL HISTORY:     ALLERGIES: Myrbetriq (Hives)  tamsulosin (Hives)  alfuzosin (Hives)  levofloxacin (Hives)      HOME MEDICATIONS:    CURRENT MEDICATIONS  MEDICATIONS (STANDING): cefTRIAXone   IVPB 1000 milliGRAM(s) IV Intermittent every 24 hours  escitalopram 5 milliGRAM(s) Oral daily  levothyroxine 137 MICROGram(s) Oral daily  metoprolol succinate  milliGRAM(s) Oral daily  midodrine. 10 milliGRAM(s) Oral three times a day  pantoprazole    Tablet 40 milliGRAM(s) Oral before breakfast  polyethylene glycol 3350 17 Gram(s) Oral two times a day  simvastatin 40 milliGRAM(s) Oral at bedtime    MEDICATIONS (PRN):  --------------------------------------------------------------------------------------------    Vitals:   T(C): 36.7 (23 @ 14:21), Max: 36.9 (23 @ 06:15)  HR: 73 (23 @ 14:21) (60 - 77)  BP: 112/51 (23 @ 14:21) (112/51 - 160/78)  RR: 20 (23 @ :21) (18 - 20)  SpO2: 94% (23 @ 14:21) (88% - 96%)  CAPILLARY BLOOD GLUCOSE          Height (cm): 170.2 ( @ 00:36)  Weight (kg): 81.6 ( 00:36)  BMI (kg/m2): 28.2 ( 00:36)  BSA (m2): 1.93 ( 00:36)      PHYSICAL EXAM:  General: NAD, Lying in bed comfortably  Neuro: Alert and answering questions appropriately   HEENT: Grossly normal, EOMI  Cardio: No peripherial edema  Resp: Good effort, no signs of respiratory distress  GI/Abd: Soft, NT/ND, no rebound/guarding, no masses palpated  VIANNEY: dark brown stool in rectal vault, internal hemorrhoids noted, no BRBPR, normal sphincter tone  Vascular: All 4 extremities warm  Musculoskeletal: All 4 extremities moving spontaneously, no limitations  --------------------------------------------------------------------------------------------    LABS  CBC (:23)                              12.4<L>                         7.14    )----------------(  152        69.4  % Neutrophils, 16.0  % Lymphocytes, ANC: 4.96                                40.3      BMP ( 01:23)             138     |  100     |  39<H> 		Ca++ --      Ca 10.6<H>             ---------------------------------( 81    		Mg 1.7                3.4<L>  |  24      |  2.33<H>			Ph --        LFTs ( 01:23)      TPro 7.0 / Alb 3.4 / TBili 0.5 / DBili -- / AST 19 / ALT 12 / AlkPhos 137<H>    Coags ( 01:23)  aPTT 36.4<H> / INR 2.29<H> / PT 23.5<H>        VBG ( @ 00:57)     7.39 / 47 / 44 / 28 / 2.9 / 67.7%     Lactate: 1.1    --------------------------------------------------------------------------------------------    MICROBIOLOGY  Urinalysis ( @ 06:13):     Color: Dark Yellow<!> / Appearance: Turbid<!> / S.017 / pH: 6.0 / Gluc: Negative / Ketones: Negative / Bili: Small<!> / Urobili: 3 mg/dL<!> / Protein :100 mg/dL<!> / Nitrites: Positive<!> / Leuk.Est: Large<!> / RBC: 463<H> / WBC: 631<H> / Sq Epi:  / Non Sq Epi:  / Bacteria Many<!>         --------------------------------------------------------------------------------------------    IMAGING  < from: CT Abdomen and Pelvis w/ IV Cont (23 @ 04:32) >  ACC: [id number] EXAM:  CT ABDOMEN AND PELVIS IC   ORDERED BY:  MISTY GUTIÉRREZ     PROCEDURE DATE:  2023          INTERPRETATION:  CLINICAL INFORMATION:  Abdominal pain.    COMPARISON: CT abdomen pelvis 2023.    PROCEDURE:  CT of the Abdomen and Pelvis was performed without intravenous contrast.  Intravenous contrast: None.  Oral contrast: None.  Sagittal and coronal reformats were performed.    FINDINGS: Absence of intravenous contrast limits evaluation of   vasculature and solid organs.    LOWER CHEST: Partially imaged median sternotomy, pacemaker, left atrial   appendage clip. Left mitral valvuloplasty. Cardiomegaly. Moderate right   pleural effusion with adjacent right lower lobe atelectasis. Small left   pleural effusion. Bilateral ground glass opacities with interlobular   septal thickening, likely represent volume overload or pulmonary edema.   Left gynecomastia.    LIVER: Within normal limits.  BILE DUCTS: Normal caliber.  GALLBLADDER: Cholelithiasis.  SPLEEN: Within normallimits.  PANCREAS: Within normal limits.  ADRENALS: Within normal limits.  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 Sub-cm   nonobstructive bilateral renal calculi.    BLADDER: Wall thickening, difficult to assess secondary to inadequate   distention.  REPRODUCTIVE ORGANS: Mild prominent prostate.    BOWEL: Abundant fecal material identified within the rectum with mild   wall thickening and trace surrounding stranding. No bowel obstruction.   Diverticulosis coli. Nonvisualized appendix.  PERITONEUM: No significant ascites. Trace presacral edema.  VESSELS:  Atherosclerotic changes.  RETROPERITONEUM: Shotty retroperitoneal lymph nodes.  ABDOMINAL WALL: Right groin hernia containing fat. Bilateral thigh   subcutaneous edema.  BONES: Degenerative changes. Partially imaged ORIF of left femur.   Surgical clips identified at the medial aspect of the left thigh.    IMPRESSION:    Abundant fecal material within the rectum with mild wall thickening and   trace surrounding stranding. Recommend clinical correlation to assess for   stercoral colitis. No bowel obstruction.    Cholelithiasis.    Findings of pulmonary edema with moderate right and small left pleural   effusion.    Additional findings mentioned above.            --- End of Report ---    < end of copied text >    --------------------------------------------------------------------------------------------  
[facility] DIVISION OF KIDNEY DISEASES AND HYPERTENSION -- [phone]  -- INITIAL CONSULT NOTE  --------------------------------------------------------------------------------  HPI: 78 year old male with PMH of BEULAH on HD (M/F via R IJ tunneled HD catheter, monitoring for recovery), Afib, CAD s/p CABG, BPH, HLD, HTN, and hypothyroidism who presented to [facility] due to rectal bleeding. Nephrology consulted for BEULAH on HD management.    Per [facility] review, patient was admitted to [facility] from 4/5/23 - 5/1/23 due to shortness of breath. He was initiated on IV diuretics initially for volume overload. His Scr on 4/1 was 1 and was 1.50 on admission and trended up to 9.66 and initiated on HD 4/10. Underwent kidney biopsy on 4/14 which was suggestive of interstitial disease likely pyelonephritis. He was discharged to rehab on HD with recommendations to monitor for recovery. He states he was initially on HD MWF, now on M/F as he is producing some urine. He was on midodrine 10mg prior to HD during last admission and per home med list, is on midodrine 10mg TID.     Pt seen and examined at bedside. He states he is "feeling okay" but notes multiple episodes of BRBPR. Hgb stable at 12, underwent CT abd/pel with evidence of abundant fecal material in rectum and proctocolitis. NM study done did not show active bleeding. He has some shortness of breath. CT also demonstrated bilateral pleural effusions (R>L). His last HD was on Monday 8/7. He is unsure of how much UF is removed or what his dry weight is.     PAST HISTORY  --------------------------------------------------------------------------------  PAST MEDICAL & SURGICAL HISTORY:  Afib  not on anticoagulation  CAD (coronary artery disease)  Varicose vein of leg  Hypothyroid  Pacemaker  H/O fracture of hip  2017  H/O asbestosis  HTN (hypertension)  Pacemaker  Medtronic - Model RVDR01 1/10/2012  Stented coronary artery  drug eluding stent 5/2007  H/O detached retina repair  1952  S/P cataract surgery  History of hip surgery  left hip ORIF  H/O varicose vein stripping  1993 left leg  History of left knee replacement  2013 - multiple infections post op requiring reoperation in 2015, 2017, 2019)  H/O foot surgery  for infection of right foot 2019  H/O inguinal hernia repair  right 1993    FAMILY HISTORY:  FH: skin cancer (Father)    PAST SOCIAL HISTORY: Denied illicit drugs     ALLERGIES & MEDICATIONS  --------------------------------------------------------------------------------  Allergies  Myrbetriq (Hives)  tamsulosin (Hives)  alfuzosin (Hives)  levofloxacin (Hives)    Intolerances    Standing Inpatient Medications  cefTRIAXone   IVPB 1000 milliGRAM(s) IV Intermittent every 24 hours  escitalopram 5 milliGRAM(s) Oral daily  hydrocortisone hemorrhoidal Suppository 1 Suppository(s) Rectal two times a day  levothyroxine 137 MICROGram(s) Oral daily  metoprolol succinate  milliGRAM(s) Oral daily  midodrine. 10 milliGRAM(s) Oral three times a day  pantoprazole    Tablet 40 milliGRAM(s) Oral before breakfast  polyethylene glycol 3350 17 Gram(s) Oral two times a day  simvastatin 40 milliGRAM(s) Oral at bedtime    PRN Inpatient Medications  midodrine. 10 milliGRAM(s) Oral <User Schedule> PRN  sodium chloride 0.9% Bolus. 100 milliLiter(s) IV Bolus every 5 minutes PRN      REVIEW OF SYSTEMS  --------------------------------------------------------------------------------  Gen: No fevers/chills  Head/Eyes/Ears: No HA  Respiratory: +dyspnea  CV: No chest pain  GI: per HPI   : No dysuria, hematuria  MSK: +B/L LE edema   Skin: No rashes  Heme: per HPI    All other systems were reviewed and are negative, except as noted.    VITALS/PHYSICAL EXAM  --------------------------------------------------------------------------------  T(C): 37 (08-11-23 @ 17:58), Max: 37 (08-11-23 @ 17:58)  HR: 71 (08-11-23 @ 17:58) (60 - 77)  BP: 116/70 (08-11-23 @ 17:58) (112/51 - 160/78)  RR: 20 (08-11-23 @ 17:58) (14 - 20)  SpO2: 95% (08-11-23 @ 17:58) (88% - 96%)  Wt(kg): --  Height (cm): 170.2 (08-11-23 @ 00:36)  Weight (kg): 81.6 (08-11-23 @ 00:36)  BMI (kg/m2): 28.2 (08-11-23 @ 00:36)  BSA (m2): 1.93 (08-11-23 @ 00:36)    Physical Exam:  	Gen: Chronically ill appearing but in NAD  	HEENT: Anicteric  	Pulm: Diminished B/L  	CV: S1S2+  	Abd: Soft, +BS    	Ext: + LE edema B/L  	Neuro: Awake             : Knapp+ with dark urine in the bag  	Skin: Warm and dry  	Dialysis access: R IJ tunneled HD catheter, site is dressed, no drainage noted    LABS/STUDIES  --------------------------------------------------------------------------------              12.4   7.14  >-----------<  152      [08-11-23 @ 01:23]              40.3     138  |  100  |  39  ----------------------------<  81      [08-11-23 @ 01:23]  3.4   |  24  |  2.33        Ca     10.6     [08-11-23 @ 01:23]      Mg     1.7     [08-11-23 @ 01:23]    TPro  7.0  /  Alb  3.4  /  TBili  0.5  /  DBili  x   /  AST  19  /  ALT  12  /  AlkPhos  137  [08-11-23 @ 01:23]    PT/INR: PT 23.5 , INR 2.29       [08-11-23 @ 01:23]  PTT: 36.4       [08-11-23 @ 01:23]    Creatinine Trend:  SCr 2.33 [08-11 @ 01:23]    Urinalysis - [08-11-23 @ 06:13]      Color Dark Yellow / Appearance Turbid / SG 1.017 / pH 6.0      Gluc Negative / Ketone Negative  / Bili Small / Urobili 3 mg/dL       Blood Large / Protein 100 mg/dL / Leuk Est Large / Nitrite Positive       /  / Hyaline 1 / Gran  / Sq Epi  / Non Sq Epi  / Bacteria Many    HBsAb <3.0      [04-21-23 @ 15:53]  HBsAb Nonreact      [04-21-23 @ 15:53]  HBsAg Nonreact      [05-01-23 @ 16:22]  HBcAb Nonreact      [04-21-23 @ 15:53]  HCV 0.16, Nonreact      [05-01-23 @ 16:22]  HIV Nonreact      [04-06-23 @ 11:32]    JAGRUTI: titer 1:160, pattern Homogeneous      [04-06-23 @ 11:29]  C3 Complement 126      [04-06-23 @ 11:47]  C4 Complement 25      [04-06-23 @ 11:47]  ANCA: cANCA Negative, pANCA Negative, atypical ANCA Negative      [04-06-23 @ 11:29]  anti-GBM <0.2      [04-06-23 @ 11:46]  PLA2R: BIANCA 4.2, IFA --      [04-06-23 @ 11:46]  Free Light Chains: kappa 25.56, lambda 25.37, ratio = 1.01      [04-28 @ 06:31]  Immunofixation Serum: Weak IgA Lambda Band Identified    Reference Range: None Detected      [04-06-23 @ 11:47]  SPEP Interpretation: Weak Beta-Migrating Paraprotein Identified      [04-06-23 @ 11:47]  Immunofixation Urine: Reference Range: None Detected      [04-30-23 @ 13:23]  UPEP Interpretation: Weak Gamma-Migrating Paraprotein Identified      [04-30-23 @ 13:23]
   HPI:  Date of Service, 23 @ 08:31  CHIEF COMPLAINT:Patient is a 78y old  Male who presents with a chief complaint of  SOB    HPI:  Liam Garza is 78 y.o. M     with PMHx Significant for HLD, A-fib, BPH, ESRD,     who presents to the ED with c.o. abd pain,   brbpr      Per patient, he hd  abd pain and rectal bleeding onset 1 day ago.     Patient notes pain is localized to the suprapubic area, radiates to the RLQ.  He notes pain is intermittent and has had minimal improvement with Tylenol. /  denies fevers, N/V, HA, changes in his vision, sore throat, cp , diarrhea   , patient notes rectal pain began in May which she describes as sharp and has been constant since onset.    PAST MEDICAL & SURGICAL HISTORY:  Afib  not on anticoagulation  CAD (coronary artery disease)  Varicose vein of leg  Hypothyroid  Pacemaker  H/O fracture of hip    H/O asbestosis  HTN (hypertension)  Stented coronary artery  drug eluding stent 2007  H/O detached retina repair  S/P cataract surgery  History of hip surgery  left hip ORIF  H/O varicose vein stripping   left leg  History of left knee replacement   - multiple infections post op requiring reoperation in , , )  H/O foot surgery  for infection of right foot   H/O inguinal hernia repair      MEDICATIONS  (STANDING):  vancomycin 500 mg/100 mL intravenous solution: 500 milligram(s) intravenously Monday, Wednesday, and Friday TO BE GIVEN ON POST HD DAYS UNTIL   · 	midodrine 10 mg oral tablet: 1 tab(s) orally 3 times a day  · 	apixaban 5 mg oral tablet: 1 tab(s) orally every 12 hours  · 	nystatin 100,000 units/g topical cream: 1 application topically 2 times a day  · 	hydrocortisone 1% topical cream: 1 application topically once a day  · 	clotrimazole 1% topical cream: 1 application topically 2 times a day  · 	metoprolol succinate 100 mg oral tablet, extended release: 1 tab(s) orally once a day  · 	ferrous sulfate 325 mg (65 mg elemental iron) oral tablet: 1 tab(s) orally once a day  · 	folic acid 1 mg oral tablet: 1 tab(s) orally once a day  · 	escitalopram 5 mg oral tablet: 1 tab(s) orally once a day  · 	Synthroid 137 mcg (0.137 mg) oral tablet: 1 tab(s) orally once a day  · 	Vitamin D3 2000 intl units (50 mcg) oral tablet: 1  orally once a day  · 	aspirin 81 mg oral tablet: 1  orally once a day  · 	Vytorin 10 mg-40 mg oral tablet: 1 tab(s) orally once a day  MEDICATIONS  (PRN):      FAMILY HISTORY:  FH: skin cancer (Father)        SOCIAL HISTORY:    [ ] Non-smoker  [ ] Smoker  [ ] Alcohol    Allergies    Myrbetriq (Hives)  tamsulosin (Hives)  alfuzosin (Hives)  levofloxacin (Hives)    Intolerances    	    REVIEW OF SYSTEMS:  CONSTITUTIONAL: No fever, weight loss, or fatigue  EYES: No eye pain, visual disturbances, or discharge  ENT:  No difficulty hearing, tinnitus, vertigo; No sinus or throat pain  NECK: No pain or stiffness  RESPIRATORY: No cough, wheezing, chills or hemoptysis; No Shortness of Breath  CARDIOVASCULAR: No chest pain, palpitations, passing out, dizziness, or leg swelling  GASTROINTESTINAL: No abdominal or epigastric pain. No nausea, vomiting, or hematemesis; No diarrhea or constipation. + bleed  GENITOURINARY: No dysuria, frequency, hematuria, or incontinence  NEUROLOGICAL: No headaches, memory loss, loss of strength, numbness, or tremors  SKIN: No itching, burning, rashes, or lesions   LYMPH Nodes: No enlarged glands  ENDOCRINE: No heat or cold intolerance; No hair loss  MUSCULOSKELETAL: No joint pain or swelling; No muscle, back, or extremity pain  PSYCHIATRIC: No depression, anxiety, mood swings, or difficulty sleeping  HEME/LYMPH: No easy bruising, or bleeding gums  ALLERGY AND IMMUNOLOGIC: No hives or eczema	    [x ] All others negative	  [ ] Unable to obtain    PHYSICAL EXAM:  T(C): 36.9 (23 @ 06:15), Max: 36.9 (23 @ 06:15)  HR: 77 (23 @ 06:15) (60 - 77)  BP: 128/62 (23 @ 06:15) (128/62 - 160/78)  RR: 20 (23 @ 06:15) (18 - 20)  SpO2: 96% (23 @ 06:15) (88% - 96%)  Wt(kg): --  I&O's Summary      Appearance: Normal	  HEENT:   Normal oral mucosa, PERRL, EOMI	  Lymphatic: No lymphadenopathy  Cardiovascular: Normal S1 S2, No JVD, + murmurs, No edema  Respiratory:rhonchi  Psychiatry: A & O x 3, Mood & affect appropriate  Gastrointestinal:  Soft, + mildly tender, + BS	  Skin: No rashes, No ecchymoses, No cyanosis	  Neurologic: Non-focal  Extremities: Normal range of motion, No clubbing, cyanosis or edema  Vascular: + pvd                        12.4   7.14  )-----------( 152      ( 11 Aug 2023 01:23 )             40.3         138  |  100  |  39<H>  ----------------------------<  81  3.4<L>   |  24  |  2.33<H>    Ca    10.6<H>      11 Aug 2023 01:23  Mg     1.7         TPro  7.0  /  Alb  3.4  /  TBili  0.5  /  DBili  x   /  AST  19  /  ALT  12  /  AlkPhos  137<H>      proBNP:   Lipid Profile:   HgA1c:   TSH:   PT/INR - ( 11 Aug 2023 01:23 )   PT: 23.5 sec;   INR: 2.29 ratio         PTT - ( 11 Aug 2023 01:23 )  PTT:36.4 sec    PREVIOUS DIAGNOSTIC TESTING:    · ST/T Wave: twi aVL and V2, changed from 2023  · QTC: 470  · QRS: 112  · Acute or Evolving MI?: no   · Abnormal Rhythm: atrial fibrillation   · Interpretation: abnormal   · Rate: 63   · EKG Date/Time: 11-Aug-2023 01:32      < from: CT Abdomen and Pelvis w/ IV Cont (23 @ 04:32) >  Abundant fecal material within the rectum with mild wall thickening and   trace surrounding stranding. Recommend clinical correlation to assess for   stercoral colitis. No bowel obstruction.    Cholelithiasis.    Findings of pulmonary edema with moderate right and small left pleural   effusion.  < from: TTE W or WO Ultrasound Enhancing Agent (23 @ 14:59) >   1. Mildly dilated left ventricular cavity size. The left ventricular wall thickness is normal. The left ventricular systolic function is moderately decreased with an ejection fraction visually estimated at 35 to 40 %.   2. The left ventricular diastolic function is indeterminate.   3. Normal right ventricular cavity size, normal right ventricular wall thickness and mildly reduced systolic function. The tricuspid annular plane systolic excursion (TAPSE) is 0.9 cm (normal >=1.7 cm).   4. The left atrium is severely dilated.   5. Bioprosthetic valve present in the mitral position. No prosthetic valvular regurgitation.   6. The aortic annulus and aortic root appear normal in size. The aortic root at sinuses of Valsalva is borderline dilated.   7. No pericardial effusion seen.   8. Unable to rule out endocarditis.   9. Compared to the transthoracic echocardiogram performed on 2022 there have been no significant interval changes.  10. Estimated pulmonary artery systolic pressure is 37 mmHg.  11. No echocardiographic evidence of vegetations.             (11 Aug 2023 08:31)    Date of service: 23 @ 10:17    REVIEW OF SYSTEMS:  CONSTITUTIONAL: No fever,  no  weight loss  ENT:  No  tinnitus,   no   vertigo  NECK: No pain or stiffness  RESPIRATORY: No cough, wheezing, chills or hemoptysis;    No Shortness of Breath  CARDIOVASCULAR: No chest pain, palpitations, dizziness  GASTROINTESTINAL: No abdominal or epigastric pain. No nausea, vomiting, or hematemesis; No diarrhea  No melena or hematochezia.  GENITOURINARY: No dysuria, frequency, hematuria, or incontinence  NEUROLOGICAL: No headaches  SKIN: No itching,  no   rash  LYMPH Nodes: No enlarged glands  ENDOCRINE: No heat or cold intolerance  MUSCULOSKELETAL: No joint pain or swelling  PSYCHIATRIC: No depression, anxiety  HEME/LYMPH: No easy bruising, or bleeding gums  ALLERGY AND IMMUNOLOGIC: No hives or eczema	    Allergies    Myrbetriq (Hives)  tamsulosin (Hives)  alfuzosin (Hives)  levofloxacin (Hives)    Intolerances        CAPILLARY BLOOD GLUCOSE        I&O's Summary      Vital Signs Last 24 Hrs  T(C): 36.9 (11 Aug 2023 06:15), Max: 36.9 (11 Aug 2023 06:15)  T(F): 98.4 (11 Aug 2023 06:15), Max: 98.4 (11 Aug 2023 06:15)  HR: 77 (11 Aug 2023 06:15) (60 - 77)  BP: 128/62 (11 Aug 2023 06:15) (128/62 - 160/78)  BP(mean): --  RR: 20 (11 Aug 2023 06:15) (18 - 20)  SpO2: 96% (11 Aug 2023 06:15) (88% - 96%)    Parameters below as of 11 Aug 2023 06:15  Patient On (Oxygen Delivery Method): nasal cannula  O2 Flow (L/min): 3      PHYSICAL EXAM:  Appearance: Normal	  HEENT:   Normal oral mucosa, PERRL, EOMI	  Lymphatic: No lymphadenopathy  Cardiovascular: Normal S1 S2, No JVD  Respiratory: Lungs clear to auscultation	  Psychiatry:    Mood & affect appropriate  Gastrointestinal:  Soft, Non-tender, + BS	  Skin: No rash, No ecchymoses	  Extremities: Normal range of motion  Vascular: Peripheral pulses palpable bilaterally    MEDICATIONS  (STANDING):  cefTRIAXone   IVPB 1000 milliGRAM(s) IV Intermittent every 24 hours  escitalopram 5 milliGRAM(s) Oral daily  ferrous    sulfate 325 milliGRAM(s) Oral daily  folic acid 1 milliGRAM(s) Oral daily  levothyroxine 137 MICROGram(s) Oral daily  metoprolol succinate  milliGRAM(s) Oral daily  midodrine. 10 milliGRAM(s) Oral three times a day  pantoprazole    Tablet 40 milliGRAM(s) Oral before breakfast  simvastatin 40 milliGRAM(s) Oral at bedtime    MEDICATIONS  (PRN):    LABS:                        12.4   7.14  )-----------( 152      ( 11 Aug 2023 01:23 )             40.3     08-11    138  |  100  |  39<H>  ----------------------------<  81  3.4<L>   |  24  |  2.33<H>    Ca    10.6<H>      11 Aug 2023 01:23  Mg     1.7     08-11    TPro  7.0  /  Alb  3.4  /  TBili  0.5  /  DBili  x   /  AST  19  /  ALT  12  /  AlkPhos  137<H>  08-11    PT/INR - ( 11 Aug 2023 01:23 )   PT: 23.5 sec;   INR: 2.29 ratio         PTT - ( 11 Aug 2023 01:23 )  PTT:36.4 sec      Urinalysis Basic - ( 11 Aug 2023 06:13 )    Color: Dark Yellow / Appearance: Turbid / S.017 / pH: x  Gluc: x / Ketone: Negative  / Bili: Small / Urobili: 3 mg/dL   Blood: x / Protein: 100 mg/dL / Nitrite: Positive   Leuk Esterase: Large / RBC: 463 /hpf /  /HPF   Sq Epi: x / Non Sq Epi: x / Bacteria: Many           @ 00:57  3.5  44              Consultant(s) Notes Reviewed:      Care Discussed with Consultants/Other Providers:  Plan:
VASCULAR SURGERY CONSULT NOTE  --------------------------------------------------------------------------------------------  Patient is a 78y old  Male who presents with a chief complaint of abdominal pain/ rectal bleed (18 Aug 2023 17:51)    HPI:  Liam Garza is 79 y.o. M with PMHx Significant for HLD, A-fib, BPH, ESRD, CANDY, who presents to the ED with c.o. abd pain, rectal pain, and bloody stools.  Per patient, he has been experiencing abd pain and rectal bleeding onset 1 day ago.  Patient notes pain is localized to the suprapubic area, radiates to the RLQ.  He notes pain is intermittent and has had minimal improvement with Tylenol.  Nothing seems to exacerbate his pain.  His only other associated symptom is chills.  Otherwise he denies fevers, N/V, HA, changes in his vision, sore throat, SOB, CP, diarrhea or constipation. Blood per rectum is accumulated in his depends. Of note, patient notes rectal pain began in May which she describes as sharp and has been constant since onset. Patient is diagnosed with pyelonephritis and scheduled for dialysis on MWF. Vascular surgery is consulted for AVF creation.     PAST MEDICAL & SURGICAL HISTORY:  Afib  not on anticoagulation  CAD (coronary artery disease)  Varicose vein of leg  Hypothyroid  Pacemaker  H/O fracture of hip  2017  H/O asbestosis  HTN (hypertension)  Stented coronary artery  drug eluding stent 5/2007  H/O detached retina repair  S/P cataract surgery  History of hip surgery  left hip ORIF  H/O varicose vein stripping  1993 left leg  History of left knee replacement  2013 - multiple infections post op requiring reoperation in 2015, 2017, 2019)  H/O foot surgery  for infection of right foot 2019  H/O inguinal hernia repair      MEDICATIONS  (STANDING):  vancomycin 500 mg/100 mL intravenous solution: 500 milligram(s) intravenously Monday, Wednesday, and Friday TO BE GIVEN ON POST HD DAYS UNTIL 5/12  · 	midodrine 10 mg oral tablet: 1 tab(s) orally 3 times a day  · 	apixaban 5 mg oral tablet: 1 tab(s) orally every 12 hours  · 	nystatin 100,000 units/g topical cream: 1 application topically 2 times a day  · 	hydrocortisone 1% topical cream: 1 application topically once a day  · 	clotrimazole 1% topical cream: 1 application topically 2 times a day  · 	metoprolol succinate 100 mg oral tablet, extended release: 1 tab(s) orally once a day  · 	ferrous sulfate 325 mg (65 mg elemental iron) oral tablet: 1 tab(s) orally once a day  · 	folic acid 1 mg oral tablet: 1 tab(s) orally once a day  · 	escitalopram 5 mg oral tablet: 1 tab(s) orally once a day  · 	Synthroid 137 mcg (0.137 mg) oral tablet: 1 tab(s) orally once a day  · 	Vitamin D3 2000 intl units (50 mcg) oral tablet: 1  orally once a day  · 	aspirin 81 mg oral tablet: 1  orally once a day  · 	Vytorin 10 mg-40 mg oral tablet: 1 tab(s) orally once a day  MEDICATIONS  (PRN):      FAMILY HISTORY:  FH: skin cancer (Father)        SOCIAL HISTORY:    [ ] Non-smoker  [ ] Smoker  [ ] Alcohol    Allergies    Myrbetriq (Hives)  tamsulosin (Hives)  alfuzosin (Hives)  levofloxacin (Hives)    Intolerances    	  REVIEW OF SYSTEMS:  CONSTITUTIONAL: No fever, weight loss, or fatigue  EYES: No eye pain, visual disturbances, or discharge  ENT:  No difficulty hearing, tinnitus, vertigo; No sinus or throat pain  NECK: No pain or stiffness  RESPIRATORY: No cough, wheezing, chills or hemoptysis; No Shortness of Breath  CARDIOVASCULAR: No chest pain, palpitations, passing out, dizziness, or leg swelling  GASTROINTESTINAL: No abdominal or epigastric pain. No nausea, vomiting, or hematemesis; No diarrhea or constipation. + bleed  GENITOURINARY: No dysuria, frequency, hematuria, or incontinence  NEUROLOGICAL: No headaches, memory loss, loss of strength, numbness, or tremors  SKIN: No itching, burning, rashes, or lesions   LYMPH Nodes: No enlarged glands  ENDOCRINE: No heat or cold intolerance; No hair loss  MUSCULOSKELETAL: No joint pain or swelling; No muscle, back, or extremity pain  PSYCHIATRIC: No depression, anxiety, mood swings, or difficulty sleeping  HEME/LYMPH: No easy bruising, or bleeding gums  ALLERGY AND IMMUNOLOGIC: No hives or eczema	    [x ] All others negative	  [ ] Unable to obtain    Vital Signs:  T(C): 36.9 (08-11-23 @ 06:15), Max: 36.9 (08-11-23 @ 06:15)  HR: 77 (08-11-23 @ 06:15) (60 - 77)  BP: 128/62 (08-11-23 @ 06:15) (128/62 - 160/78)  RR: 20 (08-11-23 @ 06:15) (18 - 20)  SpO2: 96% (08-11-23 @ 06:15) (88% - 96%)  Wt(kg): --  I&O's Summary    Physical Exam:  Appearance: Normal	  HEENT:   Normal oral mucosa, PERRL, EOMI	  Lymphatic: No lymphadenopathy  Cardiovascular: Normal S1 S2, No JVD, + murmurs, No edema  Respiratory:rhonchi  Psychiatry: A & O x 3, Mood & affect appropriate  Gastrointestinal:  Soft, + mildly tender, + BS	  Skin: No rashes, No ecchymoses, No cyanosis	  Neurologic: Non-focal  Extremities: Normal range of motion, No clubbing, cyanosis or edema  Vascular: + pvd                        12.4   7.14  )-----------( 152      ( 11 Aug 2023 01:23 )             40.3     08-11    138  |  100  |  39<H>  ----------------------------<  81  3.4<L>   |  24  |  2.33<H>    Ca    10.6<H>      11 Aug 2023 01:23  Mg     1.7     08-11    TPro  7.0  /  Alb  3.4  /  TBili  0.5  /  DBili  x   /  AST  19  /  ALT  12  /  AlkPhos  137<H>  08-11    proBNP:   Lipid Profile:   HgA1c:   TSH:   PT/INR - ( 11 Aug 2023 01:23 )   PT: 23.5 sec;   INR: 2.29 ratio         PTT - ( 11 Aug 2023 01:23 )  PTT:36.4 sec    PREVIOUS DIAGNOSTIC TESTING:    · ST/T Wave: twi aVL and V2, changed from March 2023  · QTC: 470  · QRS: 112  · Acute or Evolving MI?: no   · Abnormal Rhythm: atrial fibrillation   · Interpretation: abnormal   · Rate: 63   · EKG Date/Time: 11-Aug-2023 01:32      < from: CT Abdomen and Pelvis w/ IV Cont (08.11.23 @ 04:32) >  Abundant fecal material within the rectum with mild wall thickening and   trace surrounding stranding. Recommend clinical correlation to assess for   stercoral colitis. No bowel obstruction.    Cholelithiasis.    Findings of pulmonary edema with moderate right and small left pleural   effusion.  < from: TTE W or WO Ultrasound Enhancing Agent (03.29.23 @ 14:59) >   1. Mildly dilated left ventricular cavity size. The left ventricular wall thickness is normal. The left ventricular systolic function is moderately decreased with an ejection fraction visually estimated at 35 to 40 %.   2. The left ventricular diastolic function is indeterminate.   3. Normal right ventricular cavity size, normal right ventricular wall thickness and mildly reduced systolic function. The tricuspid annular plane systolic excursion (TAPSE) is 0.9 cm (normal >=1.7 cm).   4. The left atrium is severely dilated.   5. Bioprosthetic valve present in the mitral position. No prosthetic valvular regurgitation.   6. The aortic annulus and aortic root appear normal in size. The aortic root at sinuses of Valsalva is borderline dilated.   7. No pericardial effusion seen.   8. Unable to rule out endocarditis.   9. Compared to the transthoracic echocardiogram performed on 7/5/2022 there have been no significant interval changes.  10. Estimated pulmonary artery systolic pressure is 37 mmHg.  11. No echocardiographic evidence of vegetations.             (11 Aug 2023 08:31)      ROS: 10-system review is otherwise negative except HPI above.      PAST MEDICAL & SURGICAL HISTORY:  Afib  not on anticoagulation      CAD (coronary artery disease)      Varicose vein of leg      Hypothyroid      Pacemaker      H/O fracture of hip  2017      H/O asbestosis      HTN (hypertension)      Pacemaker  [employer] - Model RVDR01 1/10/2012      Stented coronary artery  drug eluding stent 5/2007      H/O detached retina repair  1952      S/P cataract surgery      History of hip surgery  left hip ORIF      H/O varicose vein stripping  1993 left leg      History of left knee replacement  2013 - multiple infections post op requiring reoperation in 2015, 2017, 2019)      H/O foot surgery  for infection of right foot 2019      H/O inguinal hernia repair  right 1993        FAMILY HISTORY:  FH: skin cancer (Father)        SOCIAL HISTORY:      ALLERGIES: Myrbetriq (Hives)  tamsulosin (Hives)  alfuzosin (Hives)  levofloxacin (Hives)      HOME MEDICATIONS:     CURRENT MEDICATIONS  MEDICATIONS (STANDING): ertapenem  IVPB 500 milliGRAM(s) IV Intermittent every 24 hours  escitalopram 5 milliGRAM(s) Oral daily  heparin  Infusion 1200 Unit(s)/Hr IV Continuous <Continuous>  levothyroxine 137 MICROGram(s) Oral daily  lisinopril 2.5 milliGRAM(s) Oral daily  mesalamine Suppository 1000 milliGRAM(s) Rectal at bedtime  metoprolol succinate  milliGRAM(s) Oral daily  midodrine. 10 milliGRAM(s) Oral three times a day  pantoprazole    Tablet 40 milliGRAM(s) Oral before breakfast  polyethylene glycol 3350 17 Gram(s) Oral daily  senna 2 Tablet(s) Oral at bedtime  simvastatin 40 milliGRAM(s) Oral at bedtime    MEDICATIONS (PRN):aluminum hydroxide/magnesium hydroxide/simethicone Suspension 30 milliLiter(s) Oral every 6 hours PRN Dyspepsia  midodrine. 20 milliGRAM(s) Oral <User Schedule> PRN PRIOR TO HD  sodium chloride 0.9% Bolus. 100 milliLiter(s) IV Bolus every 5 minutes PRN SBP LESS THAN or EQUAL to 90 mmHg    --------------------------------------------------------------------------------------------    Vitals:   T(C): 36.6 (08-18-23 @ 16:21), Max: 37 (08-17-23 @ 19:57)  HR: 81 (08-18-23 @ 16:21) (57 - 86)  BP: 130/62 (08-18-23 @ 16:21) (125/69 - 158/79)  RR: 18 (08-18-23 @ 16:21) (18 - 18)  SpO2: 98% (08-18-23 @ 16:21) (94% - 98%)  CAPILLARY BLOOD GLUCOSE        CAPILLARY BLOOD GLUCOSE          08-17 @ 07:01  -  08-18 @ 07:00  --------------------------------------------------------  IN:    Heparin: 73 mL    Oral Fluid: 480 mL  Total IN: 553 mL    OUT:    Indwelling Catheter - Urethral (mL): 350 mL  Total OUT: 350 mL    Total NET: 203 mL      08-18 @ 07:01  -  08-18 @ 18:21  --------------------------------------------------------  IN:    Oral Fluid: 600 mL  Total IN: 600 mL    OUT:    Indwelling Catheter - Urethral (mL): 325 mL  Total OUT: 325 mL    Total NET: 275 mL            PHYSICAL EXAM:   General: NAD, Lying in bed comfortably  Neuro: A+Ox3  HEENT: NC/AT, EOMI  Neck: Soft, supple  Cardio: RRR, nml S1/S2  Resp: Good effort, CTA b/l  GI/Abd: Soft, NT/ND, no rebound/guarding, no masses palpated  Vascular:   Skin: Intact, no breakdown  Lymphatic/Nodes: No palpable lymphadenopathy  Musculoskeletal: All 4 extremities moving spontaneously, no limitations.  --------------------------------------------------------------------------------------------    LABS  CBC (08-18 @ 07:40)                              11.1<L>                         5.45    )----------------(  136<L>     --    % Neutrophils, --    % Lymphocytes, ANC: --                                  36.1<L>  CBC (08-17 @ 11:10)                              11.4<L>                         5.07    )----------------(  137<L>     --    % Neutrophils, --    % Lymphocytes, ANC: --                                  37.1<L>    BMP (08-18 @ 07:40)             135     |  99      |  25<H> 		Ca++ --      Ca 9.8                ---------------------------------( 85    		Mg --                 3.9     |  26      |  3.02<H>			Ph --      BMP (08-17 @ 11:10)             135     |  97      |  14    		Ca++ --      Ca 9.9                ---------------------------------( 97    		Mg 1.7                3.8     |  29      |  2.03<H>			Ph 2.4<L>      Coags (08-18 @ 16:13)  aPTT 49.3<H> / INR -- / PT --  Coags (08-18 @ 08:42)  aPTT 61.2<H> / INR -- / PT --          --------------------------------------------------------------------------------------------    MICROBIOLOGY  Urinalysis (08-18 @ 07:40):     Color:  / Appearance:  / SG:  / pH:  / Gluc: 85 / Ketones:  / Bili:  / Urobili:  / Protein : / Nitrites:  / Leuk.Est:  / RBC:  / WBC:  / Sq Epi:  / Non Sq Epi:  / Bacteria        -> .Blood Blood-Peripheral Culture (08-14 @ 23:02)     NG    NG    No growth at 72 Hours    -> .Blood Blood-Peripheral Culture (08-14 @ 22:09)     NG    NG    No growth at 72 Hours    -> .Blood Blood Culture (08-13 @ 06:45)       Growth in aerobic bottle: Gram Positive Cocci in Clusters  Growth in anaerobic bottle: Gram Positive Cocci in Clusters    Blood Culture PCR    Growth in aerobic and anaerobic bottles: Staphylococcus epidermidis  Coagulase Negative Staphylococci isolated from a single blood culture set  may represent contamination.  Contact the Microbiology Department at [phone] if susceptibility  testing is clinically indicated.  Direct identification is available within approximately 3-5  hours either by Blood Panel Multiplexed PCR or Direct  MALDI-TOF. Details: [email or web address]    -> Clean Catch Clean Catch (Midstream) Culture (08-11 @ 06:13)     NG    Klebsiella pneumoniae ESBL    >100,000 CFU/ml Klebsiella pneumoniae ESBL    -> .Blood Blood-Venous Culture (08-11 @ 00:55)       Growth in anaerobic bottle: Gram Variable Rods    Blood Culture PCR    Growth in anaerobic bottle: Clostridium cadaveris "Susceptibilities not  performed"  Direct identification is available within approximately 3-5  hours either by Blood Panel Multiplexed PCR or Direct  MALDI-TOF. Details: [email or web address]    -> .Blood Blood-Venous Culture (08-11 @ 00:45)       Growth in anaerobic bottle: Gram Positive Rods    Blood Culture PCR    Growth in anaerobic bottle: Gram Positive Rods  Direct identification is available within approximately 3-5  hours either by Blood Panel Multiplexed PCR or Direct  MALDI-TOF. Details: [email or web address]      --------------------------------------------------------------------------------------------    IMAGING

## 2023-08-29 NOTE — CONSULT NOTE ADULT - REASON FOR ADMISSION
abdominal pain/ rectal bleed

## 2023-08-30 ENCOUNTER — TRANSCRIPTION ENCOUNTER (OUTPATIENT)
Age: 78
End: 2023-08-30

## 2023-08-30 VITALS
RESPIRATION RATE: 18 BRPM | TEMPERATURE: 98 F | DIASTOLIC BLOOD PRESSURE: 67 MMHG | SYSTOLIC BLOOD PRESSURE: 109 MMHG | HEART RATE: 75 BPM | OXYGEN SATURATION: 94 %

## 2023-08-30 PROCEDURE — 85014 HEMATOCRIT: CPT

## 2023-08-30 PROCEDURE — 83690 ASSAY OF LIPASE: CPT

## 2023-08-30 PROCEDURE — 82947 ASSAY GLUCOSE BLOOD QUANT: CPT

## 2023-08-30 PROCEDURE — 82435 ASSAY OF BLOOD CHLORIDE: CPT

## 2023-08-30 PROCEDURE — G1004: CPT

## 2023-08-30 PROCEDURE — 86706 HEP B SURFACE ANTIBODY: CPT

## 2023-08-30 PROCEDURE — 85520 HEPARIN ASSAY: CPT

## 2023-08-30 PROCEDURE — 85730 THROMBOPLASTIN TIME PARTIAL: CPT

## 2023-08-30 PROCEDURE — 97530 THERAPEUTIC ACTIVITIES: CPT

## 2023-08-30 PROCEDURE — 84484 ASSAY OF TROPONIN QUANT: CPT

## 2023-08-30 PROCEDURE — 86900 BLOOD TYPING SEROLOGIC ABO: CPT

## 2023-08-30 PROCEDURE — 97110 THERAPEUTIC EXERCISES: CPT

## 2023-08-30 PROCEDURE — 99285 EMERGENCY DEPT VISIT HI MDM: CPT

## 2023-08-30 PROCEDURE — 86850 RBC ANTIBODY SCREEN: CPT

## 2023-08-30 PROCEDURE — 93970 EXTREMITY STUDY: CPT

## 2023-08-30 PROCEDURE — A9560: CPT

## 2023-08-30 PROCEDURE — 84100 ASSAY OF PHOSPHORUS: CPT

## 2023-08-30 PROCEDURE — 86704 HEP B CORE ANTIBODY TOTAL: CPT

## 2023-08-30 PROCEDURE — 82330 ASSAY OF CALCIUM: CPT

## 2023-08-30 PROCEDURE — 96374 THER/PROPH/DIAG INJ IV PUSH: CPT

## 2023-08-30 PROCEDURE — C1889: CPT

## 2023-08-30 PROCEDURE — 96375 TX/PRO/DX INJ NEW DRUG ADDON: CPT

## 2023-08-30 PROCEDURE — C8929: CPT

## 2023-08-30 PROCEDURE — 87186 SC STD MICRODIL/AGAR DIL: CPT

## 2023-08-30 PROCEDURE — 90935 HEMODIALYSIS ONE EVALUATION: CPT | Mod: GC

## 2023-08-30 PROCEDURE — 85027 COMPLETE CBC AUTOMATED: CPT

## 2023-08-30 PROCEDURE — 87077 CULTURE AEROBIC IDENTIFY: CPT

## 2023-08-30 PROCEDURE — 87086 URINE CULTURE/COLONY COUNT: CPT

## 2023-08-30 PROCEDURE — 87040 BLOOD CULTURE FOR BACTERIA: CPT

## 2023-08-30 PROCEDURE — 87150 DNA/RNA AMPLIFIED PROBE: CPT

## 2023-08-30 PROCEDURE — 85018 HEMOGLOBIN: CPT

## 2023-08-30 PROCEDURE — 86901 BLOOD TYPING SEROLOGIC RH(D): CPT

## 2023-08-30 PROCEDURE — 85025 COMPLETE CBC W/AUTO DIFF WBC: CPT

## 2023-08-30 PROCEDURE — 82803 BLOOD GASES ANY COMBINATION: CPT

## 2023-08-30 PROCEDURE — 74177 CT ABD & PELVIS W/CONTRAST: CPT | Mod: ME

## 2023-08-30 PROCEDURE — 97166 OT EVAL MOD COMPLEX 45 MIN: CPT

## 2023-08-30 PROCEDURE — 97161 PT EVAL LOW COMPLEX 20 MIN: CPT

## 2023-08-30 PROCEDURE — 36415 COLL VENOUS BLD VENIPUNCTURE: CPT

## 2023-08-30 PROCEDURE — 80053 COMPREHEN METABOLIC PANEL: CPT

## 2023-08-30 PROCEDURE — 83605 ASSAY OF LACTIC ACID: CPT

## 2023-08-30 PROCEDURE — 99261: CPT

## 2023-08-30 PROCEDURE — 85610 PROTHROMBIN TIME: CPT

## 2023-08-30 PROCEDURE — 84295 ASSAY OF SERUM SODIUM: CPT

## 2023-08-30 PROCEDURE — 83735 ASSAY OF MAGNESIUM: CPT

## 2023-08-30 PROCEDURE — 87340 HEPATITIS B SURFACE AG IA: CPT

## 2023-08-30 PROCEDURE — 86705 HEP B CORE ANTIBODY IGM: CPT

## 2023-08-30 PROCEDURE — 86709 HEPATITIS A IGM ANTIBODY: CPT

## 2023-08-30 PROCEDURE — 78278 ACUTE GI BLOOD LOSS IMAGING: CPT

## 2023-08-30 PROCEDURE — 84132 ASSAY OF SERUM POTASSIUM: CPT

## 2023-08-30 PROCEDURE — 71045 X-RAY EXAM CHEST 1 VIEW: CPT

## 2023-08-30 PROCEDURE — 80048 BASIC METABOLIC PNL TOTAL CA: CPT

## 2023-08-30 PROCEDURE — 81001 URINALYSIS AUTO W/SCOPE: CPT

## 2023-08-30 PROCEDURE — 80074 ACUTE HEPATITIS PANEL: CPT

## 2023-08-30 PROCEDURE — 0225U NFCT DS DNA&RNA 21 SARSCOV2: CPT

## 2023-08-30 RX ORDER — SENNA PLUS 8.6 MG/1
2 TABLET ORAL
Qty: 0 | Refills: 0 | DISCHARGE
Start: 2023-08-30

## 2023-08-30 RX ORDER — MESALAMINE 400 MG
1 TABLET, DELAYED RELEASE (ENTERIC COATED) ORAL
Qty: 0 | Refills: 0 | DISCHARGE
Start: 2023-08-30

## 2023-08-30 RX ORDER — PANTOPRAZOLE SODIUM 20 MG/1
1 TABLET, DELAYED RELEASE ORAL
Qty: 0 | Refills: 0 | DISCHARGE
Start: 2023-08-30

## 2023-08-30 RX ORDER — POLYMYXIN B SULF/TRIMETHOPRIM 10000-1/ML
1 DROPS OPHTHALMIC (EYE)
Qty: 0 | Refills: 0 | DISCHARGE
Start: 2023-08-30

## 2023-08-30 RX ORDER — MIDODRINE HYDROCHLORIDE 2.5 MG/1
2 TABLET ORAL
Qty: 0 | Refills: 0 | DISCHARGE
Start: 2023-08-30

## 2023-08-30 RX ORDER — HYDROCORTISONE 1 %
1 OINTMENT (GRAM) TOPICAL
Qty: 0 | Refills: 0 | DISCHARGE
Start: 2023-08-30

## 2023-08-30 RX ORDER — ERYTHROPOIETIN 10000 [IU]/ML
4000 INJECTION, SOLUTION INTRAVENOUS; SUBCUTANEOUS
Qty: 0 | Refills: 0 | DISCHARGE
Start: 2023-08-30

## 2023-08-30 RX ORDER — MIDODRINE HYDROCHLORIDE 2.5 MG/1
1 TABLET ORAL
Qty: 0 | Refills: 0 | DISCHARGE
Start: 2023-08-30

## 2023-08-30 RX ORDER — NYSTATIN CREAM 100000 [USP'U]/G
1 CREAM TOPICAL
Qty: 0 | Refills: 0 | DISCHARGE
Start: 2023-08-30

## 2023-08-30 RX ORDER — ERYTHROMYCIN BASE 5 MG/GRAM
1 OINTMENT (GRAM) OPHTHALMIC (EYE)
Qty: 0 | Refills: 0 | DISCHARGE
Start: 2023-08-30

## 2023-08-30 RX ORDER — POLYETHYLENE GLYCOL 3350 17 G/17G
17 POWDER, FOR SOLUTION ORAL
Qty: 0 | Refills: 0 | DISCHARGE
Start: 2023-08-30

## 2023-08-30 RX ADMIN — Medication 1 DROP(S): at 13:27

## 2023-08-30 RX ADMIN — APIXABAN 5 MILLIGRAM(S): 2.5 TABLET, FILM COATED ORAL at 05:01

## 2023-08-30 RX ADMIN — Medication 1 DROP(S): at 05:00

## 2023-08-30 RX ADMIN — Medication 1 DROP(S): at 00:26

## 2023-08-30 RX ADMIN — ESCITALOPRAM OXALATE 5 MILLIGRAM(S): 10 TABLET, FILM COATED ORAL at 13:28

## 2023-08-30 RX ADMIN — ERYTHROPOIETIN 4000 UNIT(S): 10000 INJECTION, SOLUTION INTRAVENOUS; SUBCUTANEOUS at 10:19

## 2023-08-30 RX ADMIN — Medication 137 MICROGRAM(S): at 05:00

## 2023-08-30 RX ADMIN — MIDODRINE HYDROCHLORIDE 20 MILLIGRAM(S): 2.5 TABLET ORAL at 08:30

## 2023-08-30 RX ADMIN — PANTOPRAZOLE SODIUM 40 MILLIGRAM(S): 20 TABLET, DELAYED RELEASE ORAL at 05:25

## 2023-08-30 RX ADMIN — MIDODRINE HYDROCHLORIDE 10 MILLIGRAM(S): 2.5 TABLET ORAL at 05:00

## 2023-08-30 RX ADMIN — Medication 1 DROP(S): at 14:14

## 2023-08-30 RX ADMIN — NYSTATIN CREAM 1 APPLICATION(S): 100000 CREAM TOPICAL at 05:25

## 2023-08-30 RX ADMIN — NYSTATIN CREAM 1 APPLICATION(S): 100000 CREAM TOPICAL at 14:59

## 2023-08-30 RX ADMIN — MIDODRINE HYDROCHLORIDE 10 MILLIGRAM(S): 2.5 TABLET ORAL at 13:28

## 2023-08-30 RX ADMIN — Medication 1 DROP(S): at 08:08

## 2023-08-30 NOTE — PROGRESS NOTE ADULT - ATTENDING COMMENTS
Patient seen and examined on dialysis. Tolerating HD.     ESRD- HD today as planned.       Xochitl Penny MD  Attending Nephrologist  [phone] or via [employer]

## 2023-08-30 NOTE — PROGRESS NOTE ADULT - SUBJECTIVE AND OBJECTIVE BOX
[facility] DIVISION OF KIDNEY DISEASES AND HYPERTENSION   FOLLOW UP NOTE    --------------------------------------------------------------------------------  Chief Complaint:    24 hour events/subjective: Pt. was seen and examined today. Denies any shortness of breath, chest pain, nausea or vomiting, abd pain.      PAST HISTORY  --------------------------------------------------------------------------------  No significant changes to PMH, PSH, FHx, SHx, unless otherwise noted    ALLERGIES & MEDICATIONS  --------------------------------------------------------------------------------  Allergies    Myrbetriq (Hives)  tamsulosin (Hives)  alfuzosin (Hives)  levofloxacin (Hives)    Intolerances      Standing Inpatient Medications  apixaban 5 milliGRAM(s) Oral two times a day  artificial tears (preservative free) Ophthalmic Solution 1 Drop(s) Both EYES five times a day  epoetin val-epbx (RETACRIT) Injectable 4000 Unit(s) IV Push <User Schedule>  erythromycin   Ointment 1 Application(s) Left EYE two times a day  escitalopram 5 milliGRAM(s) Oral daily  hydrocortisone hemorrhoidal Suppository 1 Suppository(s) Rectal two times a day  levothyroxine 137 MICROGram(s) Oral daily  mesalamine Suppository 1000 milliGRAM(s) Rectal at bedtime  metoprolol succinate  milliGRAM(s) Oral daily  midodrine. 10 milliGRAM(s) Oral three times a day  nystatin Powder 1 Application(s) Topical three times a day  pantoprazole    Tablet 40 milliGRAM(s) Oral before breakfast  polyethylene glycol 3350 17 Gram(s) Oral daily  senna 2 Tablet(s) Oral at bedtime  simvastatin 40 milliGRAM(s) Oral at bedtime  trimethoprim/polymyxin Solution 1 Drop(s) Left EYE four times a day    PRN Inpatient Medications  aluminum hydroxide/magnesium hydroxide/simethicone Suspension 30 milliLiter(s) Oral every 6 hours PRN  midodrine. 20 milliGRAM(s) Oral <User Schedule> PRN      REVIEW OF SYSTEMS  --------------------------------------------------------------------------------  Gen: No fevers/chills  Head/Eyes/Ears: No HA   Respiratory: No dyspnea, cough  CV: No chest pain  GI: No abdominal pain, diarrhea  : No dysuria, hematuria  MSK: No  edema  Skin: No rashes  Heme: No easy bruising or bleeding    All other systems were reviewed and are negative, except as noted.    VITALS/PHYSICAL EXAM  --------------------------------------------------------------------------------  T(C): 36.3 (08-30-23 @ 09:35), Max: 37.1 (08-30-23 @ 04:27)  HR: 54 (08-30-23 @ 09:35) (54 - 78)  BP: 118/68 (08-30-23 @ 09:35) (98/53 - 120/67)  RR: 18 (08-30-23 @ 09:35) (18 - 18)  SpO2: 94% (08-30-23 @ 09:35) (92% - 97%)  Wt(kg): --        08-29-23 @ 07:01  -  08-30-23 @ 07:00  --------------------------------------------------------  IN: 840 mL / OUT: 600 mL / NET: 240 mL    08-30-23 @ 07:01  -  08-30-23 @ 10:40  --------------------------------------------------------  IN: 0 mL / OUT: 50 mL / NET: -50 mL        Physical Exam:  	Gen: NAD, atraumatic.   	HEENT: Anicteric, normal oral mucosa.   	Pulm: CTA B/L, no crackles  	CV: S1S2+, no m/g/r  	Abd: Soft, +BS, NT, ND  	Ext: No LE edema B/L  	Neuro: Awake, moving all 4 limbs.   pSYCH: normal mood and affect          : Knapp cath+ with clear urine  	Skin: Warm and dry  	Dialysis access: dialysis HD catheter, left forearm AVF      LABS/STUDIES  --------------------------------------------------------------------------------                Creatinine Trend:  SCr 3.33 [08-28 @ 07:46]  SCr 2.25 [08-26 @ 06:58]  SCr 2.55 [08-25 @ 04:42]  SCr 2.21 [08-23 @ 04:31]  SCr 2.00 [08-22 @ 04:31]    Urinalysis - [08-28-23 @ 07:46]      Color  / Appearance  / SG  / pH       Gluc 89 / Ketone   / Bili  / Urobili        Blood  / Protein  / Leuk Est  / Nitrite       RBC  / WBC  / Hyaline  / Gran  / Sq Epi  / Non Sq Epi  / Bacteria       HBsAb <3.0      [08-11-23 @ 21:13]  HBsAg Nonreact      [08-26-23 @ 07:01]  HBcAb Nonreact      [08-11-23 @ 21:13]  HCV 0.17, Nonreact      [08-26-23 @ 07:01]      Tacrolimus  Cyclosporine  Sirolimus  Mycophenolate  BK PCR  CMV PCR  Parvo PCR  EBV PCR

## 2023-08-30 NOTE — DISCHARGE NOTE PROVIDER - NSDCHOSPICE_GEN_A_CORE
Care Management Follow Up    Length of Stay (days): 14    Expected Discharge Date: 03/08/21(ltc)     Concerns to be Addressed: discharge planning     Patient plan of care discussed at interdisciplinary rounds: Yes    Anticipated Discharge Disposition: Long Term Care     Anticipated Discharge Services:    Anticipated Discharge DME:      Patient/family educated on Medicare website which has current facility and service quality ratings:    Education Provided on the Discharge Plan:    Patient/Family in Agreement with the Plan: no    Referrals Placed by CM/SW:    Private pay costs discussed: To be determined    Additional Information:  Amarilys Mcmahon Prepetition Screener called writer yesterday to notify hospital that the PPS staff are supporting the hospital petition.  The nursing unit received a call at 1618 on 3/3 stating patient is now on a Court Hold.   Today, patient will receive a copy of the PPS report and the schedule for his Examination and Hearing.      TOÑO AlfonsoSW      
No
The patient is a 17y Male complaining of lacerations.

## 2023-08-30 NOTE — PROGRESS NOTE ADULT - SUBJECTIVE AND OBJECTIVE BOX
date of service: 08-30-23 @ 07:03  afberile  REVIEW OF SYSTEMS:  CONSTITUTIONAL: No fever,  no  weight loss  ENT:  No  tinnitus,   no   vertigo  NECK: No pain or stiffness  RESPIRATORY: No cough, wheezing, chills or hemoptysis;    No Shortness of Breath  CARDIOVASCULAR: No chest pain, palpitations, dizziness  GASTROINTESTINAL: No abdominal or epigastric pain. No nausea, vomiting, or hematemesis; No diarrhea  No melena or hematochezia.  GENITOURINARY: No dysuria, frequency, hematuria, or incontinence  NEUROLOGICAL: No headaches  SKIN: No itching,  no   rash  LYMPH Nodes: No enlarged glands  ENDOCRINE: No heat or cold intolerance  MUSCULOSKELETAL: No joint pain or swelling  PSYCHIATRIC: No depression, anxiety  HEME/LYMPH: No easy bruising, or bleeding gums  ALLERGY AND IMMUNOLOGIC: No hives or eczema	    MEDICATIONS  (STANDING):  apixaban 5 milliGRAM(s) Oral two times a day  artificial tears (preservative free) Ophthalmic Solution 1 Drop(s) Both EYES five times a day  epoetin val-epbx (RETACRIT) Injectable 4000 Unit(s) IV Push <User Schedule>  erythromycin   Ointment 1 Application(s) Left EYE two times a day  escitalopram 5 milliGRAM(s) Oral daily  hydrocortisone hemorrhoidal Suppository 1 Suppository(s) Rectal two times a day  levothyroxine 137 MICROGram(s) Oral daily  mesalamine Suppository 1000 milliGRAM(s) Rectal at bedtime  metoprolol succinate  milliGRAM(s) Oral daily  midodrine. 10 milliGRAM(s) Oral three times a day  nystatin Powder 1 Application(s) Topical three times a day  pantoprazole    Tablet 40 milliGRAM(s) Oral before breakfast  polyethylene glycol 3350 17 Gram(s) Oral daily  senna 2 Tablet(s) Oral at bedtime  simvastatin 40 milliGRAM(s) Oral at bedtime  trimethoprim/polymyxin Solution 1 Drop(s) Left EYE four times a day    MEDICATIONS  (PRN):  aluminum hydroxide/magnesium hydroxide/simethicone Suspension 30 milliLiter(s) Oral every 6 hours PRN Dyspepsia  midodrine. 20 milliGRAM(s) Oral <User Schedule> PRN PRIOR TO HD      Vital Signs Last 24 Hrs  T(C): 37.1 (30 Aug 2023 04:27), Max: 37.1 (30 Aug 2023 04:27)  T(F): 98.8 (30 Aug 2023 04:27), Max: 98.8 (30 Aug 2023 04:27)  HR: 70 (30 Aug 2023 04:27) (62 - 78)  BP: 102/53 (30 Aug 2023 04:27) (96/57 - 120/67)  BP(mean): --  RR: 18 (30 Aug 2023 04:27) (18 - 18)  SpO2: 94% (30 Aug 2023 04:27) (92% - 97%)    Parameters below as of 30 Aug 2023 04:27  Patient On (Oxygen Delivery Method): room air      CAPILLARY BLOOD GLUCOSE        I&O's Summary    29 Aug 2023 07:01  -  30 Aug 2023 07:00  --------------------------------------------------------  IN: 840 mL / OUT: 600 mL / NET: 240 mL          Appearance: Normal	  HEENT:   Normal oral mucosa, PERRL, EOMI	  Lymphatic: No lymphadenopathy  Cardiovascular: Normal S1 S2, No JVD  Respiratory: Lungs clear to auscultation	  Gastrointestinal:  Soft, Non-tender, + BS	  Skin: No rash, No ecchymoses	  Extremities:     LABS:                        10.4   6.02  )-----------( 125      ( 28 Aug 2023 07:48 )             33.1     08-28    140  |  102  |  54<H>  ----------------------------<  89  4.2   |  25  |  3.33<H>    Ca    10.1      28 Aug 2023 07:46            Urinalysis Basic - ( 28 Aug 2023 07:46 )    Color: x / Appearance: x / SG: x / pH: x  Gluc: 89 mg/dL / Ketone: x  / Bili: x / Urobili: x   Blood: x / Protein: x / Nitrite: x   Leuk Esterase: x / RBC: x / WBC x   Sq Epi: x / Non Sq Epi: x / Bacteria: x                      Consultant(s) Notes Reviewed:      Care Discussed with Consultants/Other Providers:

## 2023-08-30 NOTE — PROGRESS NOTE ADULT - PROBLEM SELECTOR PROBLEM 1
BEULAH (acute kidney injury)
BEULAH (acute kidney injury)
ESRD on dialysis
BUELAH (acute kidney injury)
BEULAH (acute kidney injury)
BEULAH (acute kidney injury)
ESRD on dialysis
BEULAH (acute kidney injury)
ESRD on dialysis
ESRD on dialysis

## 2023-08-30 NOTE — PROGRESS NOTE ADULT - PROVIDER SPECIALTY LIST ADULT
Cardiology
Gastroenterology
Internal Medicine
Surgery
Vascular Surgery
Cardiology
Gastroenterology
Infectious Disease
Internal Medicine
Internal Medicine
Vascular Surgery
Cardiology
Gastroenterology
Infectious Disease
Internal Medicine
Gastroenterology
Infectious Disease
Internal Medicine
Nephrology
Ophthalmology
Surgery
Vascular Surgery
Nephrology-Cardiorenal

## 2023-08-30 NOTE — PROGRESS NOTE ADULT - ASSESSMENT
78  year old male    h/o  CAD s/p stent , asa.     A-fib/ PPM, , hypothyroid, and total left knee replacement    prior ppm  interrogation  with  WCT   Ct chest, retrosternal hematoma.  mod left pl effusion/ has   c/c leg  pain      admitted  with  abd pain and rectal bleeding onset 1 day ago.. arrived form n home     has  no pain  now    c/c   systolic  and  diastolic  chf,,      cardiomyopathy, Mitral  valvuloplasty,   h/o  c/c  l/edema  of L  leg    h/o    c/c AFIB,   has  PPM     h/o c/c   Anemia, , hb is  12.  gi   known to  dr joann INFANTE,     cath, with 2 vessel disease,  s/p  CABG and  MVR  surg d r marni      echo,  on 7/2022,  ef  35/ new/ severe  TR,  and ,  cath,   was non  obstructive     s/p   MSSA  bacterinia , in march 2023, completed  iv ancef..    s/p   explant pf  ppm  and  Micra  placement on  3/30/23. dr sunitha rocha      Echo,  3/2023,  ef  25,  mod  AI. severe  TR . RV hypokinesis    BEULAH, from  AIN, dx  recently , ancef / was  switched   to iv vanco,   CKD, on HD,  has  right Permcath .house  renal   s/p  renal bx on 4/ 14.. 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 + , Ig A  Lamda., known to heme  dr dobson.  pe r heme,  no  need  for  b marrow  bx     CT chest,  pl  effusion,   gi    f/p,  no  bleeding /and   nuclear  scan, no  bleeding   bacteremia   Clostridium cadeveris,  iv   ertapenem, / completed      c/c  low  bp, on midodrine    AVF of  left arm    pt with  h/o  ABMD,  and eye  drops  per  ophthalmology       d/c  planning/  pt  wants  Megan    d/c  plans          card /  dr mikayla dick       < from: Intra-Operative Transesophageal Echo W or WO Ultrasound Enhancing Agent (03.30.23 @ 12:48) >  --------------------------------------------------------------------------------  PRE-BYPASS FINDINGS  Left Ventricle:  Left ventricular ejection fraction is estimated at 25 to 30%. There is severe kypokinesis in the left ventricle.  Right Ventricle:  Moderately reduced systolic function. Moderately reduced systolic function. There is moderate hypokinesis in the right ventricle.  Left Atrium:  Diffuse smoke visualized in LA.  Aortic Valve:  The aortic valve appears trileaflet. There is moderate aortic regurgitation.  No obvious vegetations visualized  Mitral Valve:  Bioprosthetic valve is present in the mitral position. There is calcification of the mitral valve annulus.  No obvious vegetations visualized.  Tricuspid Valve:  There is moderate-severe tricuspid regurgitation. TR unchanged following lead extraction.  Pulmonic Valve:  There is mild pulmonic regurgitation.  Pericardium:  No pericardial effusion seen. No pericardial effusion visualized before and after lead extraction.  Electronically signed by Jimmy Gambino on 3/30/2023 at 3:00:58 PM  *** Final ***  < end of copied text >

## 2023-08-30 NOTE — PROGRESS NOTE ADULT - PROBLEM SELECTOR PLAN 4
Pt. with h/o anemia in the setting of ESRD. Continue with ISREAL. Monitor hgb, goal: 10-11.      Thank you for this consult.  Saman Black  Nephrology Fellow  Please contact me on TEAMS  After 5 pm please contact the on-call Fellow.
likely anemia of chronic disease. COntinue EPO with dialysis.       Xochitl Penny MD  Attending Nephrologist  [phone] or via [employer]

## 2023-08-30 NOTE — PROGRESS NOTE ADULT - SUBJECTIVE AND OBJECTIVE BOX
[facility] DEPARTMENT OF OPHTHALMOLOGY  ------------------------------------------------------------------------------  Magno Lin MD, PGY3  Also available on [employer] Teams  ------------------------------------------------------------------------------    Interval History: No acute events overnight. ***    MEDICATIONS  (STANDING):  apixaban 5 milliGRAM(s) Oral two times a day  artificial tears (preservative free) Ophthalmic Solution 1 Drop(s) Both EYES five times a day  epoetin val-epbx (RETACRIT) Injectable 4000 Unit(s) IV Push <User Schedule>  erythromycin   Ointment 1 Application(s) Left EYE two times a day  escitalopram 5 milliGRAM(s) Oral daily  hydrocortisone hemorrhoidal Suppository 1 Suppository(s) Rectal two times a day  levothyroxine 137 MICROGram(s) Oral daily  mesalamine Suppository 1000 milliGRAM(s) Rectal at bedtime  metoprolol succinate  milliGRAM(s) Oral daily  midodrine. 10 milliGRAM(s) Oral three times a day  nystatin Powder 1 Application(s) Topical three times a day  pantoprazole    Tablet 40 milliGRAM(s) Oral before breakfast  polyethylene glycol 3350 17 Gram(s) Oral daily  senna 2 Tablet(s) Oral at bedtime  simvastatin 40 milliGRAM(s) Oral at bedtime  trimethoprim/polymyxin Solution 1 Drop(s) Left EYE four times a day    MEDICATIONS  (PRN):  aluminum hydroxide/magnesium hydroxide/simethicone Suspension 30 milliLiter(s) Oral every 6 hours PRN Dyspepsia  midodrine. 20 milliGRAM(s) Oral <User Schedule> PRN PRIOR TO HD      VITALS: T(C): 36.3 (08-30-23 @ 09:35)  T(F): 97.3 (08-30-23 @ 09:35), Max: 98.8 (08-30-23 @ 04:27)  HR: 54 (08-30-23 @ 09:35) (54 - 78)  BP: 118/68 (08-30-23 @ 09:35) (98/53 - 120/67)  RR:  (18 - 18)  SpO2:  (92% - 97%)  Wt(kg): --  AAOx4    Ophthalmology Exam:  Visual acuity (cc): 20/20 OD. HM OS (stable from last exam with Dr. Thomas in March 2023)  Pupils: right pupil round and reactive, 2+ APD OS, surgical pupil  Extraocular movements (EOMs): Full OU, no pain, no diplopia. sensory exotropia OS  Confrontational Visual Field (CVF): Full OD. ARIANE 2/2 VA OS    Pen Light Exam (PLE)  External: Normal OU.  Lids/Lashes/Lacrimal Ducts: Flat OU.  Sclera/Conjunctiva: White and quiet OD. trace injection OS  Cornea: Clear OD. corneal epi defect about 1x2mm with trace stromal scarring  Anterior Chamber: Deep and formed OU.    Iris: Flat OD. irregular shape, sphincter tears OS  Lens: PCIOL OD, ACIOL OS    Fundus Exam: dilated with 1% tropicamide and 2.5% phenylephrine  Approval obtained from primary team for dilation  Patient aware that pupils can remained dilated for at least 4-6 hours.  Exam performed with 20 D lens    Vitreous: PVD OU  Disc, cup/disc: sharp and pink, 0.5 OD, optic atrophy OS PPA OU  Macula: wnl OU  Vessels: wnl OU  Periphery: wnl OD, encircling scleral buckle, CRS OS

## 2023-08-30 NOTE — DISCHARGE NOTE NURSING/CASE MANAGEMENT/SOCIAL WORK - PATIENT PORTAL LINK FT
You can access the FollowMyHealth Patient Portal offered by [facility] by registering at the following website: [email or web address]. By joining [facility]’s FollowMyHealth portal, you will also be able to view your health information using other applications (apps) compatible with our system.

## 2023-08-30 NOTE — PROGRESS NOTE ADULT - REASON FOR ADMISSION
abdominal pain/ rectal bleed

## 2023-08-30 NOTE — PROGRESS NOTE ADULT - SUBJECTIVE AND OBJECTIVE BOX
[facility]  Thanh Michaels PA-C  [address] [place] [id number] [phone]    INTERVAL HPI/ OVERNIGHT EVENTS:  pt seen and examined, no new events  no abdominal pain   having multiple BMs      MEDICATIONS  (STANDING):  escitalopram 5 milliGRAM(s) Oral daily  hydrocortisone hemorrhoidal Suppository 1 Suppository(s) Rectal two times a day  levothyroxine 137 MICROGram(s) Oral daily  mesalamine Suppository 1000 milliGRAM(s) Rectal at bedtime  metoprolol succinate  milliGRAM(s) Oral daily  midodrine. 10 milliGRAM(s) Oral three times a day  pantoprazole    Tablet 40 milliGRAM(s) Oral before breakfast  piperacillin/tazobactam IVPB.. 3.375 Gram(s) IV Intermittent every 8 hours  polyethylene glycol 3350 17 Gram(s) Oral two times a day  senna 2 Tablet(s) Oral at bedtime  simvastatin 40 milliGRAM(s) Oral at bedtime    MEDICATIONS  (PRN):  aluminum hydroxide/magnesium hydroxide/simethicone Suspension 30 milliLiter(s) Oral every 6 hours PRN Dyspepsia  midodrine. 10 milliGRAM(s) Oral <User Schedule> PRN PRIOR TO HD  sodium chloride 0.9% Bolus. 100 milliLiter(s) IV Bolus every 5 minutes PRN SBP LESS THAN or EQUAL to 90 mmHg  escitalopram 5 milliGRAM(s) Oral daily  hydrocortisone hemorrhoidal Suppository 1 Suppository(s) Rectal two times a day  levothyroxine 137 MICROGram(s) Oral daily  mesalamine Suppository 1000 milliGRAM(s) Rectal at bedtime  metoprolol succinate  milliGRAM(s) Oral daily  PAST MEDICAL & SURGICAL HISTORY:  Afib  not on anticoagulation      CAD (coronary artery disease)      Varicose vein of leg      Hypothyroid      Pacemaker      H/O fracture of hip        H/O asbestosis      HTN (hypertension)      Pacemaker  Medtronic - Model RVDR01 1/10/2012      Stented coronary artery  drug eluding stent 2007      H/O detached retina repair        S/P cataract surgery      History of hip surgery  left hip ORIF      H/O varicose vein stripping   left leg      History of left knee replacement   - multiple infections post op requiring reoperation in , , 2019)      H/O foot surgery  for infection of right foot 2019      H/O inguinal hernia repair  right       Family history:  No pertinent family history in first degree relatives    FH: skin cancer (Father)        Social History:   no etoh no cigs   no ivda    ROS:     General:  No wt loss, fevers, chills, night sweats, fatigue,   Eyes:  Good vision, no reported pain  ENT:  No sore throat, pain, runny nose, dysphagia  CV:  No pain, palpitations, hypo/hypertension  Resp:  No dyspnea, cough, tachypnea, wheezing  GI:  No pain, No nausea, No vomiting, No diarrhea, No constipation, No weight loss, No fever, No pruritis, No rectal bleeding, No tarry stools, No dysphagia,  :  No pain, bleeding, incontinence, nocturia  Muscle:  No pain, weakness  Neuro:  No weakness, tingling, memory problems  Psych:  No fatigue, insomnia, mood problems, depression  Endocrine:  No polyuria, polydipsia, cold/heat intolerance  Heme:  No petechiae, ecchymosis, easy bruisability  Skin:  No rash, tattoos, scars, edema      PHYSICAL EXAM:   Vital Signs Last 24 Hrs  T(C): 36.3 (30 Aug 2023 09:35), Max: 37.1 (30 Aug 2023 04:27)  T(F): 97.3 (30 Aug 2023 09:35), Max: 98.8 (30 Aug 2023 04:27)  HR: 54 (30 Aug 2023 09:35) (54 - 78)  BP: 118/68 (30 Aug 2023 09:35) (98/53 - 120/67)  BP(mean): --  RR: 18 (30 Aug 2023 09:35) (18 - 18)  SpO2: 94% (30 Aug 2023 09:35) (92% - 97%)    Parameters below as of 30 Aug 2023 09:35  Patient On (Oxygen Delivery Method): room air      Daily Height in cm: 170.18 (11 Aug 2023 00:36)    Daily     GENERAL:  Appears stated age, well-groomed, well-nourished, no distress  HEENT:  NC/AT,  conjunctivae clear and pink, no thyromegaly, nodules, adenopathy, no JVD, sclera -anicteric  CHEST:  Full & symmetric excursion, no increased effort, breath sounds clear  HEART:  Regular rhythm, S1, S2, no murmur/rub/S3/S4, no abdominal bruit, no edema  ABDOMEN:  Soft, non-tender, non-distended, normoactive bowel sounds,  no masses ,no hepato-splenomegaly, no signs of chronic liver disease  EXTEREMITIES:  no cyanosis,clubbing or edema  SKIN:  No rash/erythema/ecchymoses/petechiae/wounds/abscess/warm/dry  NEURO:  Alert, oriented, no asterixis, no tremor, no encephalopathy    LABS:        LIVER FUNCTIONS - ( 11 Aug 2023 01:23 )  Alb: 3.4 g/dL / Pro: 7.0 g/dL / ALK PHOS: 137 U/L / ALT: 12 U/L / AST: 19 U/L / GGT: x           PT/INR - ( 11 Aug 2023 01:23 )   PT: 23.5 sec;   INR: 2.29 ratio         PTT - ( 11 Aug 2023 01:23 )  PTT:36.4 sec  Urinalysis Basic - ( 11 Aug 2023 06:13 )    Color: Dark Yellow / Appearance: Turbid / S.017 / pH: x  Gluc: x / Ketone: Negative  / Bili: Small / Urobili: 3 mg/dL   Blood: x / Protein: 100 mg/dL / Nitrite: Positive   Leuk Esterase: Large / RBC: 463 /hpf /  /HPF   Sq Epi: x / Non Sq Epi: x / Bacteria: Many      Amylase Serum--      Lipase serum12       Ammonia--      Imaging:    < from: NM GI Bleed Localization (NM GI Bleed Localization .) (23 @ 17:40) >  ACC: [id number] EXAM:  NM GI BLEEDING IMG   ORDERED BY: JAYLENE PARKS     PROCEDURE DATE:  2023          INTERPRETATION:  RADIOPHARMACEUTICAL:  20.0 mCi Tc-99m labeled autologous   red blood cells, I.V.    CLINICAL INFORMATION: GI Bleeding referred for localization of bleeding   site.    TECHNIQUE: Dynamic imaging of the abdomen and pelvis was performed for 2   hours following administration of radiolabeled autologous red blood   cells. Static images of the abdomen and pelvis in the anterior and   lateral views were obtained immediately thereafter. An Anterior view of   the head/neck was obtained for  purposes.    COMPARISON: None.    FINDINGS: There is physiologic distribution of radiolabeled red cells in   the abdomen and pelvis. There is no abnormal focus of increased   radiotracer activity to suggest the presence of an active bleeding site.    IMPRESSION:    No evidence of active bleeding site.     These results were discussed with DASIA Parks NP, by Dr. BARBRA Natarajan via   telephone with read back at about 6:00 PM on 2023.    --- End of Report ---            HAM NATARAJAN MD; Attending Nuclear Medicine  This document has been electronically signed. Aug 11 2023  6:01PM    < end of copied text >

## 2023-08-30 NOTE — PROGRESS NOTE ADULT - SUBJECTIVE AND OBJECTIVE BOX
Date of Service: 08-30-23 @ 07:52           CARDIOLOGY     PROGRESS  NOTE   ________________________________________________    CHIEF COMPLAINT:Patient is a 78y old  Male who presents with a chief complaint of abdominal pain/ rectal bleed (30 Aug 2023 07:03)  no complain  	  REVIEW OF SYSTEMS:  CONSTITUTIONAL: No fever, weight loss, or fatigue  EYES: No eye pain, visual disturbances, or discharge  ENT:  No difficulty hearing, tinnitus, vertigo; No sinus or throat pain  NECK: No pain or stiffness  RESPIRATORY: No cough, wheezing, chills or hemoptysis; No Shortness of Breath  CARDIOVASCULAR: No chest pain, palpitations, passing out, dizziness, or leg swelling  GASTROINTESTINAL: No abdominal or epigastric pain. No nausea, vomiting, or hematemesis; No diarrhea or constipation. No melena or hematochezia.  GENITOURINARY: No dysuria, frequency, hematuria, or incontinence  NEUROLOGICAL: No headaches, memory loss, loss of strength, numbness, or tremors  SKIN: No itching, burning, rashes, or lesions   LYMPH Nodes: No enlarged glands  ENDOCRINE: No heat or cold intolerance; No hair loss  MUSCULOSKELETAL: No joint pain or swelling; No muscle, back, or extremity pain  PSYCHIATRIC: No depression, anxiety, mood swings, or difficulty sleeping  HEME/LYMPH: No easy bruising, or bleeding gums  ALLERGY AND IMMUNOLOGIC: No hives or eczema	    [x ] All others negative	  [ ] Unable to obtain    PHYSICAL EXAM:  T(C): 36.9 (08-30-23 @ 07:43), Max: 37.1 (08-30-23 @ 04:27)  HR: 64 (08-30-23 @ 07:43) (62 - 78)  BP: 111/63 (08-30-23 @ 07:43) (98/53 - 120/67)  RR: 18 (08-30-23 @ 07:43) (18 - 18)  SpO2: 92% (08-30-23 @ 07:43) (92% - 97%)  Wt(kg): --  I&O's Summary    29 Aug 2023 07:01  -  30 Aug 2023 07:00  --------------------------------------------------------  IN: 840 mL / OUT: 600 mL / NET: 240 mL        Appearance: Normal	  HEENT:   Normal oral mucosa, PERRL, EOMI	  Lymphatic: No lymphadenopathy  Cardiovascular: Normal S1 S2, No JVD, + murmurs, No edema  Respiratory: rhonchi  Psychiatry: A & O x 3, Mood & affect appropriate  Gastrointestinal:  Soft, Non-tender, + BS	  Skin: No rashes, No ecchymoses, No cyanosis	  Neurologic: Non-focal  Extremities: Normal range of motion, No clubbing, cyanosis or edema  Vascular: Peripheral pulses palpable 2+ bilaterally    MEDICATIONS  (STANDING):  apixaban 5 milliGRAM(s) Oral two times a day  artificial tears (preservative free) Ophthalmic Solution 1 Drop(s) Both EYES five times a day  epoetin val-epbx (RETACRIT) Injectable 4000 Unit(s) IV Push <User Schedule>  erythromycin   Ointment 1 Application(s) Left EYE two times a day  escitalopram 5 milliGRAM(s) Oral daily  hydrocortisone hemorrhoidal Suppository 1 Suppository(s) Rectal two times a day  levothyroxine 137 MICROGram(s) Oral daily  mesalamine Suppository 1000 milliGRAM(s) Rectal at bedtime  metoprolol succinate  milliGRAM(s) Oral daily  midodrine. 10 milliGRAM(s) Oral three times a day  nystatin Powder 1 Application(s) Topical three times a day  pantoprazole    Tablet 40 milliGRAM(s) Oral before breakfast  polyethylene glycol 3350 17 Gram(s) Oral daily  senna 2 Tablet(s) Oral at bedtime  simvastatin 40 milliGRAM(s) Oral at bedtime  trimethoprim/polymyxin Solution 1 Drop(s) Left EYE four times a day      TELEMETRY: 	    ECG:  	  RADIOLOGY:  OTHER: 	  	  LABS:	 	    CARDIAC MARKERS:    proBNP:   Lipid Profile:   HgA1c:   TSH:       Notes: Patient awaiting AVF 8/24, anticipate HD on Friday prior to discharge to  return to [facility], update referral sent.  will need EMS transport.    Assessment and plan  ---------------------------  Liam Garza is 79 y.o. M with PMHx Significant for HLD, A-fib, BPH, ESRD, CANDY, who presents to the ED with c.o. abd pain, rectal pain, and bloody stools.  Per patient, he has been experiencing abd pain and rectal bleeding onset 1 day ago.  Patient notes pain is localized to the suprapubic area, radiates to the RLQ.  He notes pain is intermittent and has had minimal improvement with Tylenol.  Nothing seems to exacerbate his pain.  His only other associated symptom is chills.  Otherwise he denies fevers, N/V, HA, changes in his vision, sore throat, SOB, CP, diarrhea or constipation. Blood per rectum is accumulated in his depends. Of note, patient notes rectal pain began in May which she describes as sharp and has been constant since onset.  pt with hx of chronic a.fib, chf systolic EF 35 to 40% s/p ppm ,MICRA sec to ppm exrtacction sec to + BC.  sob sec acute on chronic chf  systolic fluid overload  renal eval possible HD today  ?rectal bleed hold AC, GI eval  ASHD / A. FIb continue beta blocker   repeat echo with hx of pericardial effusion, noted with improvement in EF  GI/ MED appreciated  pulmonary edema/ chf, repeat echo check MV, continue HD with increase fluid withdrawal as tolerated  advance diet as  tolerated  doing better, oob to chair/ physical therapy  blood cultures are still +, ID appreciated awaiting ECHO concern about bioprosthetic MV, may need ERENDIRA/ id noted  will switch heparin too NOAC post procedure for AC sec to A.Fib  vascular appreciated, s/p dialysis catheter  Left eye pain, Optho eval appreciated ?corneal abrasion  dc planning to rehab/ physical therapy  oob to chair

## 2023-08-30 NOTE — DISCHARGE NOTE PROVIDER - NSDCCPCAREPLAN_GEN_ALL_CORE_FT
PRINCIPAL DISCHARGE DIAGNOSIS  Diagnosis: GI bleed  Assessment and Plan of Treatment: Take Protonix as prescribed.  Follow up with Gastroenterology      SECONDARY DISCHARGE DIAGNOSES  Diagnosis: Corneal abrasion  Assessment and Plan of Treatment: - Continue polytrim 1gtt 4x a day OS (oflox preferred however patient allergic to levaquin)  - Continue  erythromycin ointment 2x a day OS  - continue artificial tears 4-6 x a day OU  - abrasions may recur in setting of ABMD, important to keep eye well lubricated.  Follow up with Opthalmologist within 1 week after discharge.    Diagnosis: Atrial fibrillation  Assessment and Plan of Treatment: Take Eliquis as prescribed.  Monitor for bleeding.  Follow up with Cardiologist within 1 week after discharge from Rehab.    Diagnosis: ESRD on dialysis  Assessment and Plan of Treatment: Continue Hemodialysis 3 times per week as prescribed.  Follow up with Nephrologist after discharge from Rehab.    Diagnosis: Anemia  Assessment and Plan of Treatment: Monitor CBC in Rehab.  Follow up with faizan PMD/Nephrologist within 1 week after discharge from hospital.

## 2023-08-30 NOTE — DISCHARGE NOTE PROVIDER - NSDCMRMEDTOKEN_GEN_ALL_CORE_FT
aluminum hydroxide-magnesium hydroxide 200 mg-200 mg/5 mL oral suspension: 30 milliliter(s) orally every 6 hours As needed Dyspepsia  apixaban 5 mg oral tablet: 1 tab(s) orally every 12 hours  aspirin 81 mg oral tablet: 1  orally once a day  clotrimazole 1% topical cream: 1 application topically 2 times a day  epoetin val: 4,000 microgram(s) 3 times a week GIVE MONDAY, WEDNESDAY, FRIDAY  erythromycin 0.5% ophthalmic ointment: 1 application to each affected eye 2 times a day  escitalopram 5 mg oral tablet: 1 tab(s) orally once a day  ferrous sulfate 325 mg (65 mg elemental iron) oral tablet: 1 tab(s) orally once a day  folic acid 1 mg oral tablet: 1 tab(s) orally once a day  hydrocortisone 25 mg rectal suppository: 1 suppository(ies) rectal 2 times a day  mesalamine 1000 mg rectal suppository: 1 suppository(ies) rectal once a day (at bedtime)  metoprolol succinate 100 mg oral tablet, extended release: 1 tab(s) orally once a day  midodrine 10 mg oral tablet: 1 tab(s) orally 3 times a day  midodrine 10 mg oral tablet: 2 tab(s) orally 3 times a week as needed for PRIOR TO HD BEFORE HEMODIALYSIS.  nystatin 100,000 units/g topical powder: 1 Apply topically to affected area 3 times a day  ocular lubricant ophthalmic solution: 1 drop(s) to each affected eye 5 times a day  pantoprazole 40 mg oral delayed release tablet: 1 tab(s) orally once a day (before a meal)  polyethylene glycol 3350 oral powder for reconstitution: 17 gram(s) orally once a day  polymyxin B-trimethoprim 10,000 units-1 mg/mL ophthalmic solution: 1 drop(s) to each affected eye 4 times a day  senna leaf extract oral tablet: 2 tab(s) orally once a day (at bedtime)  Synthroid 137 mcg (0.137 mg) oral tablet: 1 tab(s) orally once a day  Vitamin D3 2000 intl units (50 mcg) oral tablet: 1  orally once a day  Vytorin 10 mg-40 mg oral tablet: 1 tab(s) orally once a day

## 2023-08-30 NOTE — PROGRESS NOTE ADULT - PROBLEM/PLAN-4
DISPLAY PLAN FREE TEXT
except for right knee flex 0-45 degrees/no Passive ROM deficits were identified

## 2023-08-30 NOTE — DISCHARGE NOTE PROVIDER - HOSPITAL COURSE
79 y.o. M with PMHx Significant for HLD, A-fib, BPH, ESRD, CANDY, who presented  to the ED with c.o. abd pain, rectal pain, and bloody stools.    Per patient, he had been experiencing abd pain and rectal bleeding onset 1 day prior to admission.  Patient noted pain is localized to the suprapubic area, radiates to the RLQ.  He noted pain is intermittent and had minimal improvement with Tylenol.  Nothing seemed  to exacerbate his pain.  His only other associated symptom is chills.  Otherwise he denied fevers, N/V, HA, changes in his vision, sore throat, SOB, CP, diarrhea or constipation. Blood per rectum was accumulated in his depends. Of note, patient notes rectal pain began in May which was described as sharp and has been constant since onset.  pt with hx of chronic a.fib, chf systolic EF 35 to 40% s/p ppm ,MICRA sec to ppm extraction sec to + BC.  sob sec acute on chronic chf  systolic fluid overload  renal eval possible HD today  ?rectal bleed hold AC, GI eval  ASHD / A. FIb continue beta blocker   repeat echo with hx of pericardial effusion, noted with improvement in EF  GI/ MED appreciated  pulmonary edema/ chf, repeat echo check MV, continue HD with increase fluid withdrawal as tolerated  advance diet as  tolerated  doing better, oob to chair/ physical therapy  blood cultures are still +, ID appreciated awaiting ECHO concern about bioprosthetic MV, may need ERENDIRA/ id noted  will switch heparin too NOAC post procedure for AC sec to A.Fib  vascular appreciated, s/p dialysis catheter  Left eye pain, Optho eval appreciated ?corneal abrasion  oob to chair.  Pt medically cleared for discharge  to rehab with physical therapy.

## 2023-08-30 NOTE — PROVIDER CONTACT NOTE (MEDICATION) - SITUATION
Patient scheduled for HD and has additional order to Midodrine 20mg PO prior to HD. Patient received Midrodrine 10mg PO @ 5am

## 2023-08-30 NOTE — DISCHARGE NOTE PROVIDER - PROVIDER TOKENS
PROVIDER:[TOKEN:[[id number]],FOLLOWUP:[2 weeks],ESTABLISHEDPATIENT:[T]],PROVIDER:[TOKEN:[[id number]],FOLLOWUP:[2 weeks]]

## 2023-08-30 NOTE — PROGRESS NOTE ADULT - PROBLEM SELECTOR PLAN 1
Continue HD per schedule (MWF)  Plan for HD today   Remains on Midodrine standing and pre-HD sessions   Needs Vasc Sx eval for AVF planning as respiratory status is improving and pt has been tolerating HD sessions now  Need outpatient HD set up
Continue HD per schedule (MWF). Plan for HD today. Remains on Midodrine standing and pre-HD sessions. Evaluated by [facility] Sx for AVF creation, possible plan for tomorrow morning. Plan for HD session today per schedule.
Tolerated UF (-1.4) session yesterday  Plan for HD today per schedule (MWF)  On Midodrine standing and pre-HD sessions   Needs Vasc Sx eval for AVF planning as respiratory status is improving and pt has been tolerating HD sessions now
Developed dialysis-depending BEULAH since April 2023 - he continues to be dependent on dialysis. BEULAH --> progressed to ESRD now on MWF by temporary HD catheter.   Had AVF placed yesterday and didn't have HD session per schedule.  Plan for HD session today.
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 Had a Scr of 1 in April 2023.     Last HD as outpatient was done on 8/7/23 via R IJ tunneled HD catheter. Labs reviewed. Pt. clinically stable but is requiring 2-3L NC with notable pleural effusion on CT abd/pel (R>L). Tolerated 1L UF on admission but could not tolerate UF yesterday. pt on Midodrine 10mg TID. Still on supplemental O2 which is not his baseline, please try to wean. Pt will need TIW due to fluid overload and associated SOB. Plan for UF session today along with an extra dose dose of Midodrine 20 mg once before HD.       Nephrology Fellow  Contact me directly on TEAMS  (After 5pm or on weekends, please page the on-call fellow)
Continue HD per schedule (MWF)  Plan for HD today   Remains on Midodrine standing and pre-HD sessions   Needs Vasc Sx eval for AVF planning as respiratory status is improving and pt has been tolerating HD sessions now
Developed dialysis-depending BEULAH since April 2023 - progressed to ESRD now on (MWF) by temporary HD catheter.   Had AVF placed during hospital stay.    Plan for HD session today as per schedule
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 Had a Scr of 1 in April 2023.     Last HD as outpatient was done on 8/7/23 via R IJ tunneled HD catheter. Labs reviewed. Pt. clinically stable but is requiring 2-3L NC with notable pleural effusion on CT abd/pel (R>L). Tolerated 1L UF on admission but could not tolerate UF today, pt on Midodrine 10mg TID. Still on supplemental O2 which is not his baseline, please try to wean. Discussed with the patient that he will need to go back to TIW HD due volume overload. May attempt isolated UF tomorrow if needed.    Dispo planning pending PT eval. Patient and his girlfriend interested in HHD, but if being discharged to rehab he will get HD there and then can set up HD or HHD with his nephrologist.    Thank you for involving us in the care of this patient.  Please call with any additional questions.  Arnoldo García, DO  Nephrology Fellow  Contact me directly on TEAMS  (After 5pm or on weekends, please page the on-call fellow)
Developed dialysis-depending BEULAH since April 2023 - he continues to be dependent on dialysis. BEULAH --> progressed to ESRD now.   AVF surgery today.   Since there are no metabolic derangement, we will hold off dialysis today.   Next HD on 8/26/2023.
Developed dialysis-depending BEULAH since April 2023 - progressed to ESRD now on (MWF) by temporary HD catheter.   Had AVF placed during hospital stay.    Plan for HD session today as per schedule

## 2023-08-30 NOTE — PROGRESS NOTE ADULT - PROBLEM SELECTOR PROBLEM 2
BEULAH (acute kidney injury)
Volume overload

## 2023-08-30 NOTE — DISCHARGE NOTE PROVIDER - CARE PROVIDER_API CALL
Roni Lainez  Cardiovascular Disease  [address] [place] [address] [id number]  Phone: [phone]  Fax: [phone]  Established Patient  Follow Up Time: 2 weeks    Jose Luis Solorio  Ophthalmology  [facility] [place] [address]  Phone: [phone]  Fax: [phone]  Follow Up Time: 2 weeks

## 2023-08-30 NOTE — PROGRESS NOTE ADULT - ASSESSMENT
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    1) Corneal epi defect, OS; ABMD OS  - patient with prior documented ABMD on outpatient note  - has been having eye pain on and off again for several weeks to months in the left eye  - IOP wnl, trace injection, no concern for angle closure   - ACIOL slightly displaced, but not impeding angle based on pressures  - dilated exam stable from prior visit with Dr. Hemphill  - recommend polytrim 1gtt 4x a day OS (oflox preferred however patient allergic to levaquin)  - recommend erythromycin ointment 2x a day OS  - continue artificial tears 4-6x a day OU  - abrasions may recur in setting of ABMD, important to keep eye well lubricated, if continually recurring may require BCL    Outpatient Follow-up: Patient should follow-up with his/her ophthalmologist or with [facility] Department of Ophthalmology within 1 week of after discharge at:    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    1) Corneal epi defect, OS; ABMD OS  - patient with prior documented ABMD on outpatient note  - has been having eye pain on and off again for several weeks to months in the left eye  - IOP wnl, trace injection, no concern for angle closure   - ACIOL slightly displaced, but not impeding angle based on pressures  - dilated exam stable from prior visit with Dr. Hemphill  - recommend polytrim 1gtt 4x a day OS (oflox preferred however patient allergic to levaquin)  - recommend erythromycin ointment 2x a day OS  - continue artificial tears 4-6x a day OU  - abrasions may recur in setting of ABMD, important to keep eye well lubricated, if continually recurring may require BCL    Seen and discussed with Dr. Norris, attending.    Outpatient Follow-up: Patient should follow-up with his/her ophthalmologist or with [facility] Department of Ophthalmology within 1 week of after discharge at:    [address]  [place], [place] [id number] [phone]    Magno Lin MD, PGY3  Also available on Microsoft Teams

## 2023-09-15 NOTE — PHYSICAL THERAPY INITIAL EVALUATION ADULT - NS ASR RISK AREAS PT EVAL
Daily Note     Today's date: 9/15/2023  Patient name: Julian Francis  : 1960  MRN: [id number]  Referring provider: Kyrie West MD  Dx:   Encounter Diagnosis     ICD-10-CM    1. Acute pain of right knee  M25.561       2. Presence of right artificial knee joint  Z96.651           Start Time: 1100  Stop Time: 1200  Total time in clinic (min): 60 minutes    Subjective: My knee is feeling pretty good. Objective: See treatment diary below      Assessment: Tolerated treatment well. Patient would benefit from continued PT  PT completed her program today with no pain. , we went over her exercises for home and she felt comfortable with them . It was her last visit today . Plan: D/C to home program.     Precautions: N/A    Date  Reassess 9/15 8/31 9/5   FOTO  84 SC      Manuals        Passive knee ext 5 min  JF 5 min 3 min   Passive knee flexion Quad stretch off table  JF  5 min   Gastroc/ ham / quad stretch Jf 4x  30 x  JF  JF   5 min JF 5 min   Neuro Re-Ed        SLR 20 x       SLB-foam Foam 20 " 5 x 20" 5x 3x 15" 5 " 10 x 10 " 5 x   Tandem walk-foam 5 laps airex 5 laps 5 laps  F/B 5 laps 5 laps   Clamshell 10 x 5 "   10x 5" 10 x 5 "  10 x 5 "           Ther Ex        Heel slide   np 20 x 5 "    Towel crush        Leg Ext 20  #  20 x 20# 2x15 20# 2/20  20   # 20 x 15 #  2 x 15    Leg press 50  #  2x 20 50# 2x20 50# 2/20   40 # 2 x 20  40# 2 x 20    Bike for strengthening L 5 8 min L7 8 min 8m L5 8 min bike L 5 L5 8 min   Hip machine abd / ext 15 #  20 x both 30# 20x ea.    15 # 20x ea 30 # 20 x  30 #  30 x   Squats 20 x 20x 20x 20 x 20 x   Hamstring curls 40  # x  2 x20 40# 2x20  40 #  2/15   40  #  2 x 15   40 # 3x10   Ther Activity        Step downs 6 " 20 x 6" 20x 6" 20 6 " 20 x 6 " 20 x   Lateral step up 6" 20 x 6" 20x 6" 20 x 6 " 15 x  6 " 15 x   Step Ups 8 "  30 x 8" 30x 8" 30x 8 ' 30 x 8" 20x   Bridge w/ LAQ 20x   20x   15x   Gait Training                Modalities
fall
fall

## 2023-09-30 ENCOUNTER — INPATIENT (INPATIENT)
Facility: HOSPITAL | Age: 78
LOS: 17 days | Discharge: HOSPICE | DRG: 314 | End: 2023-10-18
Attending: HOSPITALIST | Admitting: INTERNAL MEDICINE
Payer: MEDICARE

## 2023-09-30 VITALS
OXYGEN SATURATION: 98 % | HEIGHT: 68 IN | HEART RATE: 82 BPM | RESPIRATION RATE: 20 BRPM | SYSTOLIC BLOOD PRESSURE: 102 MMHG | TEMPERATURE: 101 F | WEIGHT: 160.06 LBS | DIASTOLIC BLOOD PRESSURE: 49 MMHG

## 2023-09-30 DIAGNOSIS — Z96.652 PRESENCE OF LEFT ARTIFICIAL KNEE JOINT: Chronic | ICD-10-CM

## 2023-09-30 DIAGNOSIS — Z98.890 OTHER SPECIFIED POSTPROCEDURAL STATES: Chronic | ICD-10-CM

## 2023-09-30 DIAGNOSIS — J18.9 PNEUMONIA, UNSPECIFIED ORGANISM: ICD-10-CM

## 2023-09-30 DIAGNOSIS — Z98.49 CATARACT EXTRACTION STATUS, UNSPECIFIED EYE: Chronic | ICD-10-CM

## 2023-09-30 DIAGNOSIS — Z95.0 PRESENCE OF CARDIAC PACEMAKER: Chronic | ICD-10-CM

## 2023-09-30 DIAGNOSIS — Z95.5 PRESENCE OF CORONARY ANGIOPLASTY IMPLANT AND GRAFT: Chronic | ICD-10-CM

## 2023-09-30 LAB
ALBUMIN SERPL ELPH-MCNC: 2.4 G/DL — LOW (ref 3.3–5)
ALP SERPL-CCNC: 207 U/L — HIGH (ref 40–120)
ALT FLD-CCNC: 74 U/L — HIGH (ref 10–45)
ANION GAP SERPL CALC-SCNC: 8 MMOL/L — SIGNIFICANT CHANGE UP (ref 5–17)
APTT BLD: 35.6 SEC — SIGNIFICANT CHANGE UP (ref 24.5–35.6)
AST SERPL-CCNC: 86 U/L — HIGH (ref 10–40)
BASOPHILS # BLD AUTO: 0.03 K/UL — SIGNIFICANT CHANGE UP (ref 0–0.2)
BASOPHILS NFR BLD AUTO: 0.3 % — SIGNIFICANT CHANGE UP (ref 0–2)
BILIRUB SERPL-MCNC: 0.8 MG/DL — SIGNIFICANT CHANGE UP (ref 0.2–1.2)
BUN SERPL-MCNC: 45 MG/DL — HIGH (ref 7–23)
CALCIUM SERPL-MCNC: 8.4 MG/DL — SIGNIFICANT CHANGE UP (ref 8.4–10.5)
CHLORIDE SERPL-SCNC: 97 MMOL/L — SIGNIFICANT CHANGE UP (ref 96–108)
CO2 SERPL-SCNC: 28 MMOL/L — SIGNIFICANT CHANGE UP (ref 22–31)
CREAT SERPL-MCNC: 3.59 MG/DL — HIGH (ref 0.5–1.3)
EGFR: 17 ML/MIN/1.73M2 — LOW
EOSINOPHIL # BLD AUTO: 0 K/UL — SIGNIFICANT CHANGE UP (ref 0–0.5)
EOSINOPHIL NFR BLD AUTO: 0 % — SIGNIFICANT CHANGE UP (ref 0–6)
FLUAV AG NPH QL: SIGNIFICANT CHANGE UP
FLUBV AG NPH QL: SIGNIFICANT CHANGE UP
GLUCOSE SERPL-MCNC: 113 MG/DL — HIGH (ref 70–99)
HCT VFR BLD CALC: 28.6 % — LOW (ref 39–50)
HGB BLD-MCNC: 9.4 G/DL — LOW (ref 13–17)
IMM GRANULOCYTES NFR BLD AUTO: 1.5 % — HIGH (ref 0–0.9)
INR BLD: 2.69 RATIO — HIGH (ref 0.85–1.18)
LACTATE SERPL-SCNC: 1.1 MMOL/L — SIGNIFICANT CHANGE UP (ref 0.7–2)
LYMPHOCYTES # BLD AUTO: 0.26 K/UL — LOW (ref 1–3.3)
LYMPHOCYTES # BLD AUTO: 2.3 % — LOW (ref 13–44)
MCHC RBC-ENTMCNC: 30.7 PG — SIGNIFICANT CHANGE UP (ref 27–34)
MCHC RBC-ENTMCNC: 32.9 GM/DL — SIGNIFICANT CHANGE UP (ref 32–36)
MCV RBC AUTO: 93.5 FL — SIGNIFICANT CHANGE UP (ref 80–100)
MONOCYTES # BLD AUTO: 0.48 K/UL — SIGNIFICANT CHANGE UP (ref 0–0.9)
MONOCYTES NFR BLD AUTO: 4.3 % — SIGNIFICANT CHANGE UP (ref 2–14)
NEUTROPHILS # BLD AUTO: 10.19 K/UL — HIGH (ref 1.8–7.4)
NEUTROPHILS NFR BLD AUTO: 91.6 % — HIGH (ref 43–77)
NRBC # BLD: 0 /100 WBCS — SIGNIFICANT CHANGE UP (ref 0–0)
PLATELET # BLD AUTO: 93 K/UL — LOW (ref 150–400)
POTASSIUM SERPL-MCNC: 3 MMOL/L — LOW (ref 3.5–5.3)
POTASSIUM SERPL-SCNC: 3 MMOL/L — LOW (ref 3.5–5.3)
PROT SERPL-MCNC: 6.5 G/DL — SIGNIFICANT CHANGE UP (ref 6–8.3)
PROTHROM AB SERPL-ACNC: 29.8 SEC — HIGH (ref 9.5–13)
RBC # BLD: 3.06 M/UL — LOW (ref 4.2–5.8)
RBC # FLD: 15.5 % — HIGH (ref 10.3–14.5)
RSV RNA NPH QL NAA+NON-PROBE: SIGNIFICANT CHANGE UP
SARS-COV-2 RNA SPEC QL NAA+PROBE: SIGNIFICANT CHANGE UP
SODIUM SERPL-SCNC: 133 MMOL/L — LOW (ref 135–145)
WBC # BLD: 11.13 K/UL — HIGH (ref 3.8–10.5)
WBC # FLD AUTO: 11.13 K/UL — HIGH (ref 3.8–10.5)

## 2023-09-30 PROCEDURE — 99285 EMERGENCY DEPT VISIT HI MDM: CPT

## 2023-09-30 PROCEDURE — 71045 X-RAY EXAM CHEST 1 VIEW: CPT | Mod: 26

## 2023-09-30 PROCEDURE — 93010 ELECTROCARDIOGRAM REPORT: CPT

## 2023-09-30 RX ORDER — VANCOMYCIN HCL 1 G
1000 VIAL (EA) INTRAVENOUS ONCE
Refills: 0 | Status: COMPLETED | OUTPATIENT
Start: 2023-09-30 | End: 2023-09-30

## 2023-09-30 RX ORDER — CEFEPIME 1 G/1
1000 INJECTION, POWDER, FOR SOLUTION INTRAMUSCULAR; INTRAVENOUS ONCE
Refills: 0 | Status: COMPLETED | OUTPATIENT
Start: 2023-09-30 | End: 2023-09-30

## 2023-09-30 RX ORDER — SODIUM CHLORIDE 9 MG/ML
500 INJECTION INTRAMUSCULAR; INTRAVENOUS; SUBCUTANEOUS ONCE
Refills: 0 | Status: COMPLETED | OUTPATIENT
Start: 2023-09-30 | End: 2023-09-30

## 2023-09-30 RX ORDER — ACETAMINOPHEN 500 MG
650 TABLET ORAL ONCE
Refills: 0 | Status: DISCONTINUED | OUTPATIENT
Start: 2023-09-30 | End: 2023-09-30

## 2023-09-30 RX ORDER — INFLUENZA VIRUS VACCINE 15; 15; 15; 15 UG/.5ML; UG/.5ML; UG/.5ML; UG/.5ML
0.7 SUSPENSION INTRAMUSCULAR ONCE
Refills: 0 | Status: DISCONTINUED | OUTPATIENT
Start: 2023-09-30 | End: 2023-10-18

## 2023-09-30 RX ORDER — ACETAMINOPHEN 500 MG
500 TABLET ORAL ONCE
Refills: 0 | Status: COMPLETED | OUTPATIENT
Start: 2023-09-30 | End: 2023-09-30

## 2023-09-30 RX ORDER — SODIUM CHLORIDE 9 MG/ML
500 INJECTION INTRAMUSCULAR; INTRAVENOUS; SUBCUTANEOUS
Refills: 0 | Status: COMPLETED | OUTPATIENT
Start: 2023-09-30 | End: 2023-09-30

## 2023-09-30 RX ORDER — POTASSIUM CHLORIDE 20 MEQ
40 PACKET (EA) ORAL ONCE
Refills: 0 | Status: COMPLETED | OUTPATIENT
Start: 2023-09-30 | End: 2023-09-30

## 2023-09-30 RX ORDER — PIPERACILLIN AND TAZOBACTAM 4; .5 G/20ML; G/20ML
3.38 INJECTION, POWDER, LYOPHILIZED, FOR SOLUTION INTRAVENOUS EVERY 12 HOURS
Refills: 0 | Status: DISCONTINUED | OUTPATIENT
Start: 2023-09-30 | End: 2023-10-01

## 2023-09-30 RX ORDER — NOREPINEPHRINE BITARTRATE/D5W 8 MG/250ML
0.05 PLASTIC BAG, INJECTION (ML) INTRAVENOUS
Qty: 8 | Refills: 0 | Status: DISCONTINUED | OUTPATIENT
Start: 2023-09-30 | End: 2023-10-02

## 2023-09-30 RX ADMIN — Medication 250 MILLIGRAM(S): at 20:05

## 2023-09-30 RX ADMIN — CEFEPIME 1000 MILLIGRAM(S): 1 INJECTION, POWDER, FOR SOLUTION INTRAMUSCULAR; INTRAVENOUS at 20:00

## 2023-09-30 RX ADMIN — Medication 500 MILLIGRAM(S): at 19:40

## 2023-09-30 RX ADMIN — SODIUM CHLORIDE 500 MILLILITER(S): 9 INJECTION INTRAMUSCULAR; INTRAVENOUS; SUBCUTANEOUS at 22:14

## 2023-09-30 RX ADMIN — SODIUM CHLORIDE 500 MILLILITER(S): 9 INJECTION INTRAMUSCULAR; INTRAVENOUS; SUBCUTANEOUS at 23:35

## 2023-09-30 RX ADMIN — Medication 200 MILLIGRAM(S): at 19:25

## 2023-09-30 RX ADMIN — SODIUM CHLORIDE 500 MILLILITER(S): 9 INJECTION INTRAMUSCULAR; INTRAVENOUS; SUBCUTANEOUS at 22:00

## 2023-09-30 RX ADMIN — Medication 500 MILLIGRAM(S): at 19:55

## 2023-09-30 RX ADMIN — CEFEPIME 100 MILLIGRAM(S): 1 INJECTION, POWDER, FOR SOLUTION INTRAMUSCULAR; INTRAVENOUS at 19:30

## 2023-09-30 RX ADMIN — PIPERACILLIN AND TAZOBACTAM 25 GRAM(S): 4; .5 INJECTION, POWDER, LYOPHILIZED, FOR SOLUTION INTRAVENOUS at 23:40

## 2023-09-30 RX ADMIN — Medication 1000 MILLIGRAM(S): at 21:05

## 2023-09-30 RX ADMIN — Medication 40 MILLIEQUIVALENT(S): at 23:01

## 2023-09-30 RX ADMIN — SODIUM CHLORIDE 500 MILLILITER(S): 9 INJECTION INTRAMUSCULAR; INTRAVENOUS; SUBCUTANEOUS at 22:50

## 2023-09-30 NOTE — PATIENT PROFILE ADULT - PUBLIC BENEFITS
Pt still wants to go to rehab at [facility]  Called intake states still waiting on precert from [employer]  And would call us as soon as they got it.    Pt updated   no

## 2023-09-30 NOTE — H&P ADULT - NSHPPHYSICALEXAM_GEN_ALL_CORE
T(F): 97.6 (09-30-23 @ 22:18), Max: 102.6 (09-30-23 @ 19:17)  HR: 78 (09-30-23 @ 22:18) (78 - 89)  BP: 88/66 (09-30-23 @ 22:18) (88/66 - 102/49)  RR: 18 (09-30-23 @ 22:18) (18 - 20)  SpO2: 94% (09-30-23 @ 22:18)  Wt(kg): --    Gen: Ill appearing elderly man  CV: irregular rate/rythm  Resp: CTA  GI: Soft NT ND  Extrem: Cool dry no edema

## 2023-09-30 NOTE — PATIENT PROFILE ADULT - FUNCTIONAL ASSESSMENT - BASIC MOBILITY 6.
2-calculated by average/Not able to assess (calculate score using [facility] averaging method)  2-calculated by average/Not able to assess (calculate score using [facility] averaging method)  2-calculated by average/Not able to assess (calculate score using [facility] averaging method)

## 2023-09-30 NOTE — ED PROVIDER NOTE - CLINICAL SUMMARY MEDICAL DECISION MAKING FREE TEXT BOX
78-year-old male history of end-stage renal disease came to the emergency room chief complaint get tired she checked her temperature 102.2 in the emergency room and cough and wheezing patient is on hemodialysis  Chest x-ray bilateral pneumonia lactic acid normal patient hypotensive despite of 1.5 L of plan to admit the patient in ICU

## 2023-09-30 NOTE — H&P ADULT - HISTORY OF PRESENT ILLNESS
79 yo M pmhx ESRD came to ED complaining of fatigue. Patient noted to be febrile to 102.2 in ED with complains of cough and wheezing. Received 1l NS bolus and noted to be hypotensive to 80's systolic. At bedside patient is oriented to self and place. Complains of some abdominal pain but is unsure why he is in the hospital. Poor historian and unable to fully participate in ROS 77 yo M pmhx ESRD came to ED complaining of fatigue. Patient noted to be febrile to 102.2 in ED with complains of cough and wheezing. Received 1l NS bolus and noted to be hypotensive to 80's systolic. At bedside patient is oriented to self and place. Complains of some abdominal pain but is unsure why he is in the hospital. Poor historian and unable to fully participate in ROS

## 2023-09-30 NOTE — PATIENT PROFILE ADULT - FALL HARM RISK - HARM RISK INTERVENTIONS
Assistance with ambulation/Assistance OOB with selected safe patient handling equipment/Communicate Risk of Fall with Harm to all staff/Discuss with provider need for PT consult/Monitor for mental status changes/Monitor gait and stability/Provide patient with walking aids - walker, cane, crutches/Reinforce activity limits and safety measures with patient and family/Reorient to person, place and time as needed/Review medications for side effects contributing to fall risk/Sit up slowly, dangle for a short time, stand at bedside before walking/Tailored Fall Risk Interventions/Use of alarms - bed, chair and/or voice tab/Visual Cue: Yellow wristband and red socks/Bed in lowest position, wheels locked, appropriate side rails in place/Call bell, personal items and telephone in reach/Instruct patient to call for assistance before getting out of bed or chair/Non-slip footwear when patient is out of bed/[place] to call system/Physically safe environment - no spills, clutter or unnecessary equipment/Purposeful Proactive Rounding/Room/bathroom lighting operational, light cord in reach Assistance with ambulation/Assistance OOB with selected safe patient handling equipment/Communicate Risk of Fall with Harm to all staff/Discuss with provider need for PT consult/Monitor for mental status changes/Monitor gait and stability/Provide patient with walking aids - walker, cane, crutches/Reinforce activity limits and safety measures with patient and family/Reorient to person, place and time as needed/Review medications for side effects contributing to fall risk/Sit up slowly, dangle for a short time, stand at bedside before walking/Tailored Fall Risk Interventions/Use of alarms - bed, chair and/or voice tab/Visual Cue: Yellow wristband and red socks/Bed in lowest position, wheels locked, appropriate side rails in place/Call bell, personal items and telephone in reach/Instruct patient to call for assistance before getting out of bed or chair/Non-slip footwear when patient is out of bed/[place] to call system/Physically safe environment - no spills, clutter or unnecessary equipment/Purposeful Proactive Rounding/Room/bathroom lighting operational, light cord in reach Assistance with ambulation/Assistance OOB with selected safe patient handling equipment/Communicate Risk of Fall with Harm to all staff/Discuss with provider need for PT consult/Monitor for mental status changes/Monitor gait and stability/Provide patient with walking aids - walker, cane, crutches/Reinforce activity limits and safety measures with patient and family/Reorient to person, place and time as needed/Review medications for side effects contributing to fall risk/Sit up slowly, dangle for a short time, stand at bedside before walking/Tailored Fall Risk Interventions/Use of alarms - bed, chair and/or voice tab/Visual Cue: Yellow wristband and red socks/Bed in lowest position, wheels locked, appropriate side rails in place/Call bell, personal items and telephone in reach/Instruct patient to call for assistance before getting out of bed or chair/Non-slip footwear when patient is out of bed/[place] to call system/Physically safe environment - no spills, clutter or unnecessary equipment/Purposeful Proactive Rounding/Room/bathroom lighting operational, light cord in reach

## 2023-09-30 NOTE — ED ADULT NURSE NOTE - OBJECTIVE STATEMENT
Pt is alert, brought tot he ER due to fever and AML as of today as per EMS. Pt is from NH with [facility], CPR order. History of Hemodialysis with Perma Cath on the right upper chest. Pt is alert, brought tot he ER due to fever and AML as of today as per EMS. Pt is from NH with [facility]", CPR order. History of Hemodialysis with Perma Cath on the right upper chest. Pt is alert, brought tot he ER due to fever and AML as of today as per EMS. Pt is from NH with [facility], CPR order. History of Hemodialysis with Perma Cath on the right upper chest.

## 2023-09-30 NOTE — H&P ADULT - ASSESSMENT
Problems  Septic shock  Sepsis  Metabolic encephalopathy  ESRD on HD  Hypokalemia      Assessment/Plan:  -Metabolic encephalopathy likely in setting of sepsis. Unclear baseline  -Recieved 1L NS bolus. Ordered for additional NS bolus. Patient remained hypotensive. Ordered for Levophed gtt. Actively titrating for MAP >65. Lactate negative  -NPO at this time  -Pan cultured. Recieved Vancomycin 1G. Ordered for Zosyn. Check MRSA swab  -ESRD on HD; unclear last HD session. Consult Nephrology in AM. Ordered for K+ supplementation  -DVT PPX w/ Heparin SC    Attempted to contact emergency contact Zena. Message left  Discussed w/ Intensivist. Admit to ICU    Critical care time spent on this patient encounter, which includes documenting this note in the electronic medical record, was 42 minutes including assessing the presenting problems with associated risks, reviewing the medical record to prepare for the encounter, and meeting face to face with the patient to obtain additional history.  I have also performed an appropriate physical exam, made interventions listed and ordered and interpreted appropriate diagnostic studies as documented.     To improve communication and patient safety, I have coordinated care with the multidisciplinary team including the bedside nurse, appropriate attending of record and consultants as needed.

## 2023-09-30 NOTE — PATIENT PROFILE ADULT - FALL HARM RISK - FACTORS
Impaired gait/Impaired vision/IV and/or equipment tethered to patient/Other medical problems/Weakness/Other

## 2023-09-30 NOTE — ED PROVIDER NOTE - OBJECTIVE STATEMENT
78-year-old male history of end-stage renal disease came to the emergency room chief complaint get tired she checked her temperature 102.2 in the emergency room and cough and wheezing patient is on hemodialysis

## 2023-09-30 NOTE — ED PROVIDER NOTE - PHYSICAL EXAMINATION
General:     NAD, well-nourished, well-appearing  Head:     NC/AT, EOMI, oral mucosa dry  Neck:     trachea midline  Lungs:     bilateral rales shily R chest wall   CVS:     S1S2, RRR, no m/g/r  Abd:     +BS, s/nt/nd, no organomegaly  Ext:    2+ radial and pedal pulses, no c/c/e  Neuro: AAOx3, no sensory/motor deficits

## 2023-09-30 NOTE — PATIENT PROFILE ADULT - DOES PATIENT HAVE ADVANCE DIRECTIVE
Depression Screening and Follow-up Plan: Patient was screened for depression during today's encounter  They screened negative with a PHQ-2 score of 0  No

## 2023-09-30 NOTE — ED ADULT TRIAGE NOTE - CHIEF COMPLAINT QUOTE
BIB EMS from [facility] for r/o sepsis. Pt altered mental status since this morning, febrile, and hypotensive. BIB EMS from [facility] for r/o sepsis. Pt altered mental status since this morning, febrile, and hypotensive. 16-Mar-2022 14:42 BIB EMS from [facility] for r/o sepsis. Pt altered mental status since this morning, febrile, and hypotensive.

## 2023-09-30 NOTE — H&P ADULT - NSHPLABSRESULTS_GEN_ALL_CORE
9.4    11.13 )-----------( 93       ( 30 Sep 2023 19:15 )             28.6       09-30    133<L>  |  97  |  45<H>  ----------------------------<  113<H>  3.0<L>   |  28  |  3.59<H>    Ca    8.4      30 Sep 2023 19:15    TPro  6.5  /  Alb  2.4<L>  /  TBili  0.8  /  DBili  x   /  AST  86<H>  /  ALT  74<H>  /  AlkPhos  207<H>  09-30    Lactate 1.1           09-30 @ 19:15          PT/INR - ( 30 Sep 2023 19:15 )   PT: 29.8 sec;   INR: 2.69 ratio         PTT - ( 30 Sep 2023 19:15 )  PTT:35.6 sec  Urinalysis Basic - ( 30 Sep 2023 19:15 )    Color: x / Appearance: x / SG: x / pH: x  Gluc: 113 mg/dL / Ketone: x  / Bili: x / Urobili: x   Blood: x / Protein: x / Nitrite: x   Leuk Esterase: x / RBC: x / WBC x   Sq Epi: x / Non Sq Epi: x / Bacteria: x  :

## 2023-09-30 NOTE — ED ADULT NURSE NOTE - NSFALLRISKINTERV_ED_ALL_ED
Assistance OOB with selected safe patient handling equipment if applicable/Assistance with ambulation/Communicate fall risk and risk factors to all staff, patient, and family/Monitor gait and stability/Monitor for mental status changes and reorient to person, place, and time, as needed/Provide patient with walking aids/Provide visual cue: yellow wristband, yellow gown, etc/Reinforce activity limits and safety measures with patient and family/Toileting schedule using arm’s reach rule for commode and bathroom/Use of alarms - bed, stretcher, chair and/or video monitoring/Call bell, personal items and telephone in reach/Instruct patient to call for assistance before getting out of bed/chair/stretcher/Non-slip footwear applied when patient is off stretcher/[place] to call system/Physically safe environment - no spills, clutter or unnecessary equipment/Purposeful Proactive Rounding/Room/bathroom lighting operational, light cord in reach Assistance OOB with selected safe patient handling equipment if applicable/Assistance with ambulation/Communicate fall risk and risk factors to all staff, patient, and family/Monitor gait and stability/Monitor for mental status changes and reorient to person, place, and time, as needed/Provide patient with walking aids/Provide visual cue: yellow wristband, yellow gown, etc/Reinforce activity limits and safety measures with patient and family/Toileting schedule using arm’s reach rule for commode and bathroom/Use of alarms - bed, stretcher, chair and/or video monitoring/Call bell, personal items and telephone in reach/Instruct patient to call for assistance before getting out of bed/chair/stretcher/Non-slip footwear applied when patient is off stretcher/[place] to call system/Physically safe environment - no spills, clutter or unnecessary equipment/Purposeful Proactive Rounding/Room/bathroom lighting operational, light cord in reach Assistance OOB with selected safe patient handling equipment if applicable/Assistance with ambulation/Communicate fall risk and risk factors to all staff, patient, and family/Monitor gait and stability/Monitor for mental status changes and reorient to person, place, and time, as needed/Provide patient with walking aids/Provide visual cue: yellow wristband, yellow gown, etc/Reinforce activity limits and safety measures with patient and family/Toileting schedule using arm’s reach rule for commode and bathroom/Use of alarms - bed, stretcher, chair and/or video monitoring/Call bell, personal items and telephone in reach/Instruct patient to call for assistance before getting out of bed/chair/stretcher/Non-slip footwear applied when patient is off stretcher/[place] to call system/Physically safe environment - no spills, clutter or unnecessary equipment/Purposeful Proactive Rounding/Room/bathroom lighting operational, light cord in reach

## 2023-09-30 NOTE — ED ADULT NURSE NOTE - CHIEF COMPLAINT QUOTE
BIB EMS from [facility] for r/o sepsis. Pt altered mental status since this morning, febrile, and hypotensive. BIB EMS from [facility] for r/o sepsis. Pt altered mental status since this morning, febrile, and hypotensive. BIB EMS from [facility] for r/o sepsis. Pt altered mental status since this morning, febrile, and hypotensive.

## 2023-09-30 NOTE — ED ADULT TRIAGE NOTE - NURSING HOMES
[facility]

## 2023-10-01 ENCOUNTER — TRANSCRIPTION ENCOUNTER (OUTPATIENT)
Age: 78
End: 2023-10-01

## 2023-10-01 DIAGNOSIS — Z95.5 PRESENCE OF CORONARY ANGIOPLASTY IMPLANT AND GRAFT: ICD-10-CM

## 2023-10-01 DIAGNOSIS — N18.6 END STAGE RENAL DISEASE: ICD-10-CM

## 2023-10-01 DIAGNOSIS — I48.20 CHRONIC ATRIAL FIBRILLATION, UNSPECIFIED: ICD-10-CM

## 2023-10-01 DIAGNOSIS — I25.10 ATHEROSCLEROTIC HEART DISEASE OF NATIVE CORONARY ARTERY WITHOUT ANGINA PECTORIS: ICD-10-CM

## 2023-10-01 DIAGNOSIS — Z87.438 PERSONAL HISTORY OF OTHER DISEASES OF MALE GENITAL ORGANS: ICD-10-CM

## 2023-10-01 DIAGNOSIS — Z95.2 PRESENCE OF PROSTHETIC HEART VALVE: ICD-10-CM

## 2023-10-01 LAB
ALBUMIN SERPL ELPH-MCNC: 2 G/DL — LOW (ref 3.3–5)
ALP SERPL-CCNC: 162 U/L — HIGH (ref 40–120)
ALT FLD-CCNC: 63 U/L — HIGH (ref 10–45)
ANION GAP SERPL CALC-SCNC: 12 MMOL/L — SIGNIFICANT CHANGE UP (ref 5–17)
ANION GAP SERPL CALC-SCNC: 13 MMOL/L — SIGNIFICANT CHANGE UP (ref 5–17)
ANION GAP SERPL CALC-SCNC: 9 MMOL/L — SIGNIFICANT CHANGE UP (ref 5–17)
ANISOCYTOSIS BLD QL: SLIGHT — SIGNIFICANT CHANGE UP
APTT BLD: 34.6 SEC — SIGNIFICANT CHANGE UP (ref 24.5–35.6)
AST SERPL-CCNC: 67 U/L — HIGH (ref 10–40)
BASOPHILS # BLD AUTO: 0 K/UL — SIGNIFICANT CHANGE UP (ref 0–0.2)
BASOPHILS NFR BLD AUTO: 0 % — SIGNIFICANT CHANGE UP (ref 0–2)
BILIRUB SERPL-MCNC: 0.8 MG/DL — SIGNIFICANT CHANGE UP (ref 0.2–1.2)
BUN SERPL-MCNC: 48 MG/DL — HIGH (ref 7–23)
BUN SERPL-MCNC: 55 MG/DL — HIGH (ref 7–23)
BUN SERPL-MCNC: 60 MG/DL — HIGH (ref 7–23)
BURR CELLS BLD QL SMEAR: PRESENT — SIGNIFICANT CHANGE UP
CALCIUM SERPL-MCNC: 7.3 MG/DL — LOW (ref 8.4–10.5)
CALCIUM SERPL-MCNC: 8.2 MG/DL — LOW (ref 8.4–10.5)
CALCIUM SERPL-MCNC: 8.3 MG/DL — LOW (ref 8.4–10.5)
CHLORIDE SERPL-SCNC: 103 MMOL/L — SIGNIFICANT CHANGE UP (ref 96–108)
CHLORIDE SERPL-SCNC: 98 MMOL/L — SIGNIFICANT CHANGE UP (ref 96–108)
CHLORIDE SERPL-SCNC: 99 MMOL/L — SIGNIFICANT CHANGE UP (ref 96–108)
CO2 SERPL-SCNC: 23 MMOL/L — SIGNIFICANT CHANGE UP (ref 22–31)
CO2 SERPL-SCNC: 24 MMOL/L — SIGNIFICANT CHANGE UP (ref 22–31)
CREAT SERPL-MCNC: 3.42 MG/DL — HIGH (ref 0.5–1.3)
CREAT SERPL-MCNC: 3.89 MG/DL — HIGH (ref 0.5–1.3)
CREAT SERPL-MCNC: 4.06 MG/DL — HIGH (ref 0.5–1.3)
D DIMER BLD IA.RAPID-MCNC: 2066 NG/ML DDU — HIGH
EGFR: 14 ML/MIN/1.73M2 — LOW
EGFR: 15 ML/MIN/1.73M2 — LOW
EGFR: 18 ML/MIN/1.73M2 — LOW
ELLIPTOCYTES BLD QL SMEAR: SLIGHT — SIGNIFICANT CHANGE UP
EOSINOPHIL # BLD AUTO: 0 K/UL — SIGNIFICANT CHANGE UP (ref 0–0.5)
EOSINOPHIL NFR BLD AUTO: 0 % — SIGNIFICANT CHANGE UP (ref 0–6)
FIBRINOGEN PPP-MCNC: 413 MG/DL — SIGNIFICANT CHANGE UP (ref 200–470)
GLUCOSE SERPL-MCNC: 101 MG/DL — HIGH (ref 70–99)
GLUCOSE SERPL-MCNC: 108 MG/DL — HIGH (ref 70–99)
GLUCOSE SERPL-MCNC: 89 MG/DL — SIGNIFICANT CHANGE UP (ref 70–99)
GRAM STN FLD: SIGNIFICANT CHANGE UP
HCT VFR BLD CALC: 29.3 % — LOW (ref 39–50)
HCT VFR BLD CALC: 31.9 % — LOW (ref 39–50)
HCV AB S/CO SERPL IA: 0.12 S/CO — SIGNIFICANT CHANGE UP (ref 0–0.99)
HCV AB SERPL-IMP: SIGNIFICANT CHANGE UP
HGB BLD-MCNC: 10.4 G/DL — LOW (ref 13–17)
HGB BLD-MCNC: 9.4 G/DL — LOW (ref 13–17)
HYPOCHROMIA BLD QL: SLIGHT — SIGNIFICANT CHANGE UP
INR BLD: 1.89 RATIO — HIGH (ref 0.85–1.18)
LACTATE SERPL-SCNC: 1.6 MMOL/L — SIGNIFICANT CHANGE UP (ref 0.7–2)
LDH SERPL L TO P-CCNC: 232 U/L — SIGNIFICANT CHANGE UP (ref 50–242)
LYMPHOCYTES # BLD AUTO: 0.16 K/UL — LOW (ref 1–3.3)
LYMPHOCYTES # BLD AUTO: 0.22 K/UL — LOW (ref 1–3.3)
LYMPHOCYTES # BLD AUTO: 2 % — LOW (ref 13–44)
MAGNESIUM SERPL-MCNC: 1.2 MG/DL — LOW (ref 1.6–2.6)
MAGNESIUM SERPL-MCNC: 2.2 MG/DL — SIGNIFICANT CHANGE UP (ref 1.6–2.6)
MAGNESIUM SERPL-MCNC: 2.3 MG/DL — SIGNIFICANT CHANGE UP (ref 1.6–2.6)
MANUAL SMEAR VERIFICATION: SIGNIFICANT CHANGE UP
MCHC RBC-ENTMCNC: 30.5 PG — SIGNIFICANT CHANGE UP (ref 27–34)
MCHC RBC-ENTMCNC: 32.1 GM/DL — SIGNIFICANT CHANGE UP (ref 32–36)
MCHC RBC-ENTMCNC: 32.6 GM/DL — SIGNIFICANT CHANGE UP (ref 32–36)
MCV RBC AUTO: 93.5 FL — SIGNIFICANT CHANGE UP (ref 80–100)
MCV RBC AUTO: 95.1 FL — SIGNIFICANT CHANGE UP (ref 80–100)
METHOD TYPE: SIGNIFICANT CHANGE UP
MONOCYTES # BLD AUTO: 0.16 K/UL — SIGNIFICANT CHANGE UP (ref 0–0.9)
MONOCYTES # BLD AUTO: 0.65 K/UL — SIGNIFICANT CHANGE UP (ref 0–0.9)
MONOCYTES NFR BLD AUTO: 2 % — SIGNIFICANT CHANGE UP (ref 2–14)
MONOCYTES NFR BLD AUTO: 6 % — SIGNIFICANT CHANGE UP (ref 2–14)
MRSA PCR RESULT.: DETECTED
MRSA SPEC QL CULT: SIGNIFICANT CHANGE UP
NEUTROPHILS # BLD AUTO: 7.75 K/UL — HIGH (ref 1.8–7.4)
NEUTROPHILS # BLD AUTO: 9.91 K/UL — HIGH (ref 1.8–7.4)
NEUTROPHILS NFR BLD AUTO: 77 % — SIGNIFICANT CHANGE UP (ref 43–77)
NEUTROPHILS NFR BLD AUTO: 92 % — HIGH (ref 43–77)
NEUTS BAND # BLD: 15 % — HIGH (ref 0–8)
NEUTS BAND # BLD: 4 % — SIGNIFICANT CHANGE UP (ref 0–8)
NRBC # BLD: 0 /100 — SIGNIFICANT CHANGE UP (ref 0–0)
OVALOCYTES BLD QL SMEAR: SLIGHT — SIGNIFICANT CHANGE UP
PHOSPHATE SERPL-MCNC: 3.3 MG/DL — SIGNIFICANT CHANGE UP (ref 2.5–4.5)
PHOSPHATE SERPL-MCNC: 3.5 MG/DL — SIGNIFICANT CHANGE UP (ref 2.5–4.5)
PLAT MORPH BLD: NORMAL — SIGNIFICANT CHANGE UP
PLATELET # BLD AUTO: 56 K/UL — LOW (ref 150–400)
PLATELET # BLD AUTO: 60 K/UL — LOW (ref 150–400)
POIKILOCYTOSIS BLD QL AUTO: SLIGHT — SIGNIFICANT CHANGE UP
POTASSIUM SERPL-MCNC: 3.2 MMOL/L — LOW (ref 3.5–5.3)
POTASSIUM SERPL-MCNC: 3.4 MMOL/L — LOW (ref 3.5–5.3)
POTASSIUM SERPL-MCNC: 4.1 MMOL/L — SIGNIFICANT CHANGE UP (ref 3.5–5.3)
POTASSIUM SERPL-SCNC: 3.2 MMOL/L — LOW (ref 3.5–5.3)
POTASSIUM SERPL-SCNC: 3.4 MMOL/L — LOW (ref 3.5–5.3)
POTASSIUM SERPL-SCNC: 4.1 MMOL/L — SIGNIFICANT CHANGE UP (ref 3.5–5.3)
PROT SERPL-MCNC: 6 G/DL — SIGNIFICANT CHANGE UP (ref 6–8.3)
PROTHROM AB SERPL-ACNC: 21.7 SEC — HIGH (ref 9.5–13)
RAPID RVP RESULT: SIGNIFICANT CHANGE UP
RBC # BLD: 3.08 M/UL — LOW (ref 4.2–5.8)
RBC # BLD: 3.41 M/UL — LOW (ref 4.2–5.8)
RBC # FLD: 15.7 % — HIGH (ref 10.3–14.5)
RBC BLD AUTO: ABNORMAL
RBC BLD AUTO: SIGNIFICANT CHANGE UP
ROULEAUX BLD QL SMEAR: PRESENT
S AUREUS DNA NOSE QL NAA+PROBE: DETECTED
SARS-COV-2 RNA SPEC QL NAA+PROBE: SIGNIFICANT CHANGE UP
SODIUM SERPL-SCNC: 134 MMOL/L — LOW (ref 135–145)
SODIUM SERPL-SCNC: 135 MMOL/L — SIGNIFICANT CHANGE UP (ref 135–145)
SODIUM SERPL-SCNC: 136 MMOL/L — SIGNIFICANT CHANGE UP (ref 135–145)
SPECIMEN SOURCE: SIGNIFICANT CHANGE UP
WBC # BLD: 10.77 K/UL — HIGH (ref 3.8–10.5)
WBC # BLD: 8.07 K/UL — SIGNIFICANT CHANGE UP (ref 3.8–10.5)
WBC # FLD AUTO: 10.77 K/UL — HIGH (ref 3.8–10.5)
WBC # FLD AUTO: 8.07 K/UL — SIGNIFICANT CHANGE UP (ref 3.8–10.5)

## 2023-10-01 PROCEDURE — 93306 TTE W/DOPPLER COMPLETE: CPT | Mod: 26

## 2023-10-01 PROCEDURE — 71045 X-RAY EXAM CHEST 1 VIEW: CPT | Mod: 26

## 2023-10-01 RX ORDER — MEROPENEM 1 G/30ML
500 INJECTION INTRAVENOUS EVERY 24 HOURS
Refills: 0 | Status: DISCONTINUED | OUTPATIENT
Start: 2023-10-02 | End: 2023-10-02

## 2023-10-01 RX ORDER — POTASSIUM CHLORIDE 20 MEQ
10 PACKET (EA) ORAL ONCE
Refills: 0 | Status: COMPLETED | OUTPATIENT
Start: 2023-10-01 | End: 2023-10-01

## 2023-10-01 RX ORDER — LEVOTHYROXINE SODIUM 125 MCG
137 TABLET ORAL DAILY
Refills: 0 | Status: DISCONTINUED | OUTPATIENT
Start: 2023-10-01 | End: 2023-10-09

## 2023-10-01 RX ORDER — VANCOMYCIN HCL 1 G
VIAL (EA) INTRAVENOUS
Refills: 0 | Status: COMPLETED | OUTPATIENT
Start: 2023-10-01 | End: 2023-10-02

## 2023-10-01 RX ORDER — MESALAMINE 400 MG
1000 TABLET, DELAYED RELEASE (ENTERIC COATED) ORAL AT BEDTIME
Refills: 0 | Status: DISCONTINUED | OUTPATIENT
Start: 2023-10-01 | End: 2023-10-14

## 2023-10-01 RX ORDER — CASPOFUNGIN ACETATE 7 MG/ML
INJECTION, POWDER, LYOPHILIZED, FOR SOLUTION INTRAVENOUS
Refills: 0 | Status: COMPLETED | OUTPATIENT
Start: 2023-10-01 | End: 2023-10-01

## 2023-10-01 RX ORDER — FOLIC ACID 0.8 MG
1 TABLET ORAL DAILY
Refills: 0 | Status: DISCONTINUED | OUTPATIENT
Start: 2023-10-01 | End: 2023-10-18

## 2023-10-01 RX ORDER — HYDROCORTISONE 1 %
1 OINTMENT (GRAM) TOPICAL
Refills: 0 | Status: DISCONTINUED | OUTPATIENT
Start: 2023-10-01 | End: 2023-10-18

## 2023-10-01 RX ORDER — VANCOMYCIN HCL 1 G
1000 VIAL (EA) INTRAVENOUS ONCE
Refills: 0 | Status: COMPLETED | OUTPATIENT
Start: 2023-10-01 | End: 2023-10-01

## 2023-10-01 RX ORDER — PANTOPRAZOLE SODIUM 20 MG/1
40 TABLET, DELAYED RELEASE ORAL
Refills: 0 | Status: DISCONTINUED | OUTPATIENT
Start: 2023-10-01 | End: 2023-10-06

## 2023-10-01 RX ORDER — MEROPENEM 1 G/30ML
500 INJECTION INTRAVENOUS ONCE
Refills: 0 | Status: COMPLETED | OUTPATIENT
Start: 2023-10-01 | End: 2023-10-01

## 2023-10-01 RX ORDER — CASPOFUNGIN ACETATE 7 MG/ML
70 INJECTION, POWDER, LYOPHILIZED, FOR SOLUTION INTRAVENOUS ONCE
Refills: 0 | Status: COMPLETED | OUTPATIENT
Start: 2023-10-01 | End: 2023-10-01

## 2023-10-01 RX ORDER — MIDODRINE HYDROCHLORIDE 2.5 MG/1
20 TABLET ORAL
Refills: 0 | Status: DISCONTINUED | OUTPATIENT
Start: 2023-10-01 | End: 2023-10-02

## 2023-10-01 RX ORDER — POTASSIUM CHLORIDE 20 MEQ
40 PACKET (EA) ORAL EVERY 4 HOURS
Refills: 0 | Status: DISCONTINUED | OUTPATIENT
Start: 2023-10-01 | End: 2023-10-01

## 2023-10-01 RX ORDER — ASPIRIN/CALCIUM CARB/MAGNESIUM 324 MG
81 TABLET ORAL DAILY
Refills: 0 | Status: DISCONTINUED | OUTPATIENT
Start: 2023-10-01 | End: 2023-10-14

## 2023-10-01 RX ORDER — ALBUMIN HUMAN 25 %
50 VIAL (ML) INTRAVENOUS EVERY 6 HOURS
Refills: 0 | Status: COMPLETED | OUTPATIENT
Start: 2023-10-01 | End: 2023-10-02

## 2023-10-01 RX ORDER — VANCOMYCIN HCL 1 G
1000 VIAL (EA) INTRAVENOUS EVERY 24 HOURS
Refills: 0 | Status: COMPLETED | OUTPATIENT
Start: 2023-10-02 | End: 2023-10-02

## 2023-10-01 RX ORDER — POTASSIUM CHLORIDE 20 MEQ
40 PACKET (EA) ORAL ONCE
Refills: 0 | Status: COMPLETED | OUTPATIENT
Start: 2023-10-01 | End: 2023-10-01

## 2023-10-01 RX ORDER — MEROPENEM 1 G/30ML
INJECTION INTRAVENOUS
Refills: 0 | Status: DISCONTINUED | OUTPATIENT
Start: 2023-10-01 | End: 2023-10-02

## 2023-10-01 RX ORDER — CASPOFUNGIN ACETATE 7 MG/ML
50 INJECTION, POWDER, LYOPHILIZED, FOR SOLUTION INTRAVENOUS EVERY 24 HOURS
Refills: 0 | Status: COMPLETED | OUTPATIENT
Start: 2023-10-02 | End: 2023-10-01

## 2023-10-01 RX ORDER — MAGNESIUM SULFATE 500 MG/ML
2 VIAL (ML) INJECTION ONCE
Refills: 0 | Status: COMPLETED | OUTPATIENT
Start: 2023-10-01 | End: 2023-10-01

## 2023-10-01 RX ORDER — CHOLECALCIFEROL (VITAMIN D3) 125 MCG
2000 CAPSULE ORAL ONCE
Refills: 0 | Status: COMPLETED | OUTPATIENT
Start: 2023-10-01 | End: 2023-10-01

## 2023-10-01 RX ORDER — APIXABAN 2.5 MG/1
5 TABLET, FILM COATED ORAL
Refills: 0 | Status: DISCONTINUED | OUTPATIENT
Start: 2023-10-01 | End: 2023-10-01

## 2023-10-01 RX ORDER — SENNA PLUS 8.6 MG/1
2 TABLET ORAL AT BEDTIME
Refills: 0 | Status: DISCONTINUED | OUTPATIENT
Start: 2023-10-01 | End: 2023-10-14

## 2023-10-01 RX ORDER — POLYETHYLENE GLYCOL 3350 17 G/17G
17 POWDER, FOR SOLUTION ORAL DAILY
Refills: 0 | Status: DISCONTINUED | OUTPATIENT
Start: 2023-10-01 | End: 2023-10-18

## 2023-10-01 RX ORDER — FERROUS SULFATE 325(65) MG
325 TABLET ORAL DAILY
Refills: 0 | Status: DISCONTINUED | OUTPATIENT
Start: 2023-10-01 | End: 2023-10-01

## 2023-10-01 RX ORDER — MIDODRINE HYDROCHLORIDE 2.5 MG/1
10 TABLET ORAL EVERY 8 HOURS
Refills: 0 | Status: DISCONTINUED | OUTPATIENT
Start: 2023-10-01 | End: 2023-10-06

## 2023-10-01 RX ADMIN — Medication 50 MILLILITER(S): at 14:15

## 2023-10-01 RX ADMIN — Medication 25 GRAM(S): at 06:50

## 2023-10-01 RX ADMIN — MIDODRINE HYDROCHLORIDE 10 MILLIGRAM(S): 2.5 TABLET ORAL at 21:20

## 2023-10-01 RX ADMIN — Medication 1000 MILLIGRAM(S): at 21:32

## 2023-10-01 RX ADMIN — SENNA PLUS 2 TABLET(S): 8.6 TABLET ORAL at 21:22

## 2023-10-01 RX ADMIN — Medication 81 MILLIGRAM(S): at 21:22

## 2023-10-01 RX ADMIN — Medication 40 MILLIEQUIVALENT(S): at 08:52

## 2023-10-01 RX ADMIN — Medication 40 MILLIEQUIVALENT(S): at 17:15

## 2023-10-01 RX ADMIN — Medication 25 GRAM(S): at 08:52

## 2023-10-01 RX ADMIN — Medication 50 MILLILITER(S): at 20:00

## 2023-10-01 RX ADMIN — CASPOFUNGIN ACETATE 70 MILLIGRAM(S): 7 INJECTION, POWDER, LYOPHILIZED, FOR SOLUTION INTRAVENOUS at 10:57

## 2023-10-01 RX ADMIN — Medication 6.81 MICROGRAM(S)/KG/MIN: at 16:12

## 2023-10-01 RX ADMIN — MIDODRINE HYDROCHLORIDE 10 MILLIGRAM(S): 2.5 TABLET ORAL at 14:24

## 2023-10-01 RX ADMIN — Medication 100 MILLIEQUIVALENT(S): at 06:18

## 2023-10-01 RX ADMIN — PIPERACILLIN AND TAZOBACTAM 25 GRAM(S): 4; .5 INJECTION, POWDER, LYOPHILIZED, FOR SOLUTION INTRAVENOUS at 06:14

## 2023-10-01 RX ADMIN — PANTOPRAZOLE SODIUM 40 MILLIGRAM(S): 20 TABLET, DELAYED RELEASE ORAL at 09:48

## 2023-10-01 RX ADMIN — MEROPENEM 500 MILLIGRAM(S): 1 INJECTION INTRAVENOUS at 09:48

## 2023-10-01 RX ADMIN — Medication 2000 UNIT(S): at 21:21

## 2023-10-01 RX ADMIN — Medication 250 MILLIGRAM(S): at 09:48

## 2023-10-01 NOTE — DIETITIAN INITIAL EVALUATION ADULT - PERTINENT MEDS FT
MEDICATIONS  (STANDING):  caspofungin IVPB      influenza  Vaccine (HIGH DOSE) 0.7 milliLiter(s) IntraMuscular once  meropenem  IVPB      norepinephrine Infusion 0.05 MICROgram(s)/kG/Min (6.81 mL/Hr) IV Continuous <Continuous>  pantoprazole    Tablet 40 milliGRAM(s) Oral before breakfast  vancomycin  IVPB        MEDICATIONS  (PRN):

## 2023-10-01 NOTE — DIETITIAN INITIAL EVALUATION ADULT - OTHER INFO
77 yo M pmhx ESRD came to ED complaining of fatigue. Patient noted to be febrile to 102.2 in ED with complains of cough and wheezing. Received 1l NS bolus and noted to be hypotensive to 80's systolic. At bedside patient is oriented to self and place. Complains of some abdominal pain but is unsure why he is in the hospital. Poor historian and unable to fully participate in ROS.   Pt advanced to oral diet, but minimal intake this am due to lethargy. Continue on pressors. Pt stating that he has no appetite at the moment. Unable to obtain additional subjective hx. NFPE with evidence of moderate muscle wasting/fat loss. Pt with hx on HD TIW. Current weight 160lbs. Noted with hypokalemia, hypomagnesemia, replete as able. Stage I on sacrum. Last BM 9/30. Recommend adding Nepro BID. Will monitor need to downgrade diet texture if lethargy does not improve.  79 yo M pmhx ESRD came to ED complaining of fatigue. Patient noted to be febrile to 102.2 in ED with complains of cough and wheezing. Received 1l NS bolus and noted to be hypotensive to 80's systolic. At bedside patient is oriented to self and place. Complains of some abdominal pain but is unsure why he is in the hospital. Poor historian and unable to fully participate in ROS.   Pt advanced to oral diet, but minimal intake this am due to lethargy. Continue on pressors. Pt stating that he has no appetite at the moment. Unable to obtain additional subjective hx. NFPE with evidence of moderate muscle wasting/fat loss. Pt with hx on HD TIW. Current weight 160lbs. Noted with hypokalemia, hypomagnesemia, replete as able. Stage I on sacrum. Last BM 9/30. Recommend adding Nepro BID. Will monitor need to downgrade diet texture if lethargy does not improve.

## 2023-10-01 NOTE — PROGRESS NOTE ADULT - SUBJECTIVE AND OBJECTIVE BOX
Addendum to H&P/ICU Consult note  - Patient seen and examined today    ICU CONSULT  Location of Patient : [facility] CCU1 0010 02 ( CCU1)  Attending requesting Consult:Maurisio Davidson  Chief Complaint :  Fatigue  Reason For consult : Shock      Initial HPI on admission:  HPI:  77 yo M pmhx ESRD came to ED complaining of fatigue. Patient noted to be febrile to 102.2 in ED with complains of cough and wheezing. Received 1l NS bolus and noted to be hypotensive to 80's systolic. At bedside patient is oriented to self and place. Complains of some abdominal pain but is unsure why he is in the hospital. Poor historian and unable to fully participate in ROS (30 Sep 2023 22:43)    Overnight admitted to ICU, remains lethargic, requiing pressors  patinet opens eyes to touch but is not verbal and unable to provide history or ROS       Allergies  levofloxacin (Unknown)  alfuzosin (Unknown)  tamsulosin (Unknown)  Myrbetriq (Unknown)    Due to current medical status patient unable to provide medical, social, family history.  History obtained from available medical record.       Medications:  MEDICATIONS  (STANDING):  influenza  Vaccine (HIGH DOSE) 0.7 milliLiter(s) IntraMuscular once  norepinephrine Infusion 0.05 MICROgram(s)/kG/Min (6.81 mL/Hr) IV Continuous <Continuous>  piperacillin/tazobactam IVPB.. 3.375 Gram(s) IV Intermittent every 12 hours    MEDICATIONS  (PRN):      Antibiotics History  cefepime   IVPB 1000 milliGRAM(s) IV Intermittent once, 09-30-23 @ 19:12, Stop order after: 1 Doses  piperacillin/tazobactam IVPB.. 3.375 Gram(s) IV Intermittent every 12 hours, 09-30-23 @ 22:40, Stop order after: 7 Days  vancomycin  IVPB. 1000 milliGRAM(s) IV Intermittent once, 09-30-23 @ 19:35, Stop order after: 1 Doses        Home Medications:  Last Order Reconciliation Date: Not Done        LABS:                        9.4    8.07  )-----------( 60       ( 01 Oct 2023 05:30 )             29.3     10-01    136  |  103  |  48<H>  ----------------------------<  89  3.2<L>   |  24  |  3.42<H>    Ca    7.3<L>      01 Oct 2023 05:30  Phos  3.5     10-01  Mg     1.2     10-01    TPro  6.0  /  Alb  2.0<L>  /  TBili  0.8  /  DBili  x   /  AST  67<H>  /  ALT  63<H>  /  AlkPhos  162<H>  10-01  PT/INR - ( 30 Sep 2023 19:15 )   PT: 29.8 sec;   INR: 2.69 ratio         PTT - ( 30 Sep 2023 19:15 )  PTT:35.6 sec  Urinalysis Basic - ( 01 Oct 2023 05:30 )    Color: x / Appearance: x / SG: x / pH: x  Gluc: 89 mg/dL / Ketone: x  / Bili: x / Urobili: x   Blood: x / Protein: x / Nitrite: x   Leuk Esterase: x / RBC: x / WBC x   Sq Epi: x / Non Sq Epi: x / Bacteria: x              CULTURES: (if applicable)          CAPILLARY BLOOD GLUCOSE          RADIOLOGY  CXR:      CT:    ECHO:      VITALS:  T(C): 36.6 (10-01-23 @ 07:00), Max: 39.2 (09-30-23 @ 19:17)  T(F): 97.8 (10-01-23 @ 07:00), Max: 102.6 (09-30-23 @ 19:17)  HR: 115 (10-01-23 @ 07:00) (71 - 120)  BP: 97/61 (10-01-23 @ 07:00) (73/41 - 126/67)  BP(mean): 70 (10-01-23 @ 07:00) (53 - 85)  ABP: --  ABP(mean): --  RR: 17 (10-01-23 @ 07:00) (8 - 21)  SpO2: 98% (10-01-23 @ 07:00) (94% - 100%)  CVP(mm Hg): --  CVP(cm H2O): --    Ins and Outs     09-30-23 @ 07:01  -  10-01-23 @ 07:00  --------------------------------------------------------  IN: 876.2 mL / OUT: 0 mL / NET: 876.2 mL        Height (cm): 172.7 (09-30-23 @ 19:00)  Weight (kg): 72.6 (09-30-23 @ 19:36)  BMI (kg/m2): 24.3 (09-30-23 @ 19:36)        I&O's Detail    30 Sep 2023 07:01  -  01 Oct 2023 07:00  --------------------------------------------------------  IN:    IV PiggyBack: 150 mL    IV PiggyBack: 50 mL    IV PiggyBack: 100 mL    Norepinephrine: 76.2 mL    Sodium Chloride 0.9% Bolus: 500 mL  Total IN: 876.2 mL    OUT:    Voided (mL): 0 mL  Total OUT: 0 mL    Total NET: 876.2 mL          Physical Examination:  GENERAL:               Alert, Oriented, No acute distress.    HEENT:                    Pupils equal, reactive to light.  Symmetric. No JVD, Moist MM  PULM:                     Bilateral air entry, Clear to auscultation bilaterally, no significant sputum production, No Rales, No Rhonchi, No Wheezing  CVS:                         S1, S2,  No Murmur  ABD:                        Soft, nondistended, nontender, normoactive bowel sounds,   EXT:                         No edema, nontender, No Cyanosis or Clubbing   Vascular:                Warm Extremities, Normal Capillary refill, Normal Distal Pulses  SKIN:                       Warm and well perfused, no rashes noted.   NEURO:                  Alert, oriented, interactive, nonfocal, follows commands  PSYC:                      Calm, + Insight.       Addendum to H&P/ICU Consult note  - Patient seen and examined today    ICU CONSULT  Location of Patient : [facility] CCU1 0010 02 ( CCU1)  Attending requesting Consult:Maurisio Davidson  Chief Complaint :  Fatigue  Reason For consult : Shock      Initial HPI on admission:  HPI:  79 yo M pmhx ESRD came to ED complaining of fatigue. Patient noted to be febrile to 102.2 in ED with complains of cough and wheezing. Received 1l NS bolus and noted to be hypotensive to 80's systolic. At bedside patient is oriented to self and place. Complains of some abdominal pain but is unsure why he is in the hospital. Poor historian and unable to fully participate in ROS (30 Sep 2023 22:43)    Overnight admitted to ICU, remains lethargic, requiing pressors  patinet opens eyes to touch but is not verbal and unable to provide history or ROS       Allergies  levofloxacin (Unknown)  alfuzosin (Unknown)  tamsulosin (Unknown)  Myrbetriq (Unknown)    Due to current medical status patient unable to provide medical, social, family history.  History obtained from available medical record.       Medications:  MEDICATIONS  (STANDING):  influenza  Vaccine (HIGH DOSE) 0.7 milliLiter(s) IntraMuscular once  norepinephrine Infusion 0.05 MICROgram(s)/kG/Min (6.81 mL/Hr) IV Continuous <Continuous>  piperacillin/tazobactam IVPB.. 3.375 Gram(s) IV Intermittent every 12 hours    MEDICATIONS  (PRN):      Antibiotics History  cefepime   IVPB 1000 milliGRAM(s) IV Intermittent once, 09-30-23 @ 19:12, Stop order after: 1 Doses  piperacillin/tazobactam IVPB.. 3.375 Gram(s) IV Intermittent every 12 hours, 09-30-23 @ 22:40, Stop order after: 7 Days  vancomycin  IVPB. 1000 milliGRAM(s) IV Intermittent once, 09-30-23 @ 19:35, Stop order after: 1 Doses        Home Medications:  Last Order Reconciliation Date: Not Done        LABS:                        9.4    8.07  )-----------( 60       ( 01 Oct 2023 05:30 )             29.3     10-01    136  |  103  |  48<H>  ----------------------------<  89  3.2<L>   |  24  |  3.42<H>    Ca    7.3<L>      01 Oct 2023 05:30  Phos  3.5     10-01  Mg     1.2     10-01    TPro  6.0  /  Alb  2.0<L>  /  TBili  0.8  /  DBili  x   /  AST  67<H>  /  ALT  63<H>  /  AlkPhos  162<H>  10-01  PT/INR - ( 30 Sep 2023 19:15 )   PT: 29.8 sec;   INR: 2.69 ratio         PTT - ( 30 Sep 2023 19:15 )  PTT:35.6 sec  Urinalysis Basic - ( 01 Oct 2023 05:30 )    Color: x / Appearance: x / SG: x / pH: x  Gluc: 89 mg/dL / Ketone: x  / Bili: x / Urobili: x   Blood: x / Protein: x / Nitrite: x   Leuk Esterase: x / RBC: x / WBC x   Sq Epi: x / Non Sq Epi: x / Bacteria: x              CULTURES: (if applicable)          CAPILLARY BLOOD GLUCOSE          RADIOLOGY  CXR:      CT:    ECHO:      VITALS:  T(C): 36.6 (10-01-23 @ 07:00), Max: 39.2 (09-30-23 @ 19:17)  T(F): 97.8 (10-01-23 @ 07:00), Max: 102.6 (09-30-23 @ 19:17)  HR: 115 (10-01-23 @ 07:00) (71 - 120)  BP: 97/61 (10-01-23 @ 07:00) (73/41 - 126/67)  BP(mean): 70 (10-01-23 @ 07:00) (53 - 85)  ABP: --  ABP(mean): --  RR: 17 (10-01-23 @ 07:00) (8 - 21)  SpO2: 98% (10-01-23 @ 07:00) (94% - 100%)  CVP(mm Hg): --  CVP(cm H2O): --    Ins and Outs     09-30-23 @ 07:01  -  10-01-23 @ 07:00  --------------------------------------------------------  IN: 876.2 mL / OUT: 0 mL / NET: 876.2 mL        Height (cm): 172.7 (09-30-23 @ 19:00)  Weight (kg): 72.6 (09-30-23 @ 19:36)  BMI (kg/m2): 24.3 (09-30-23 @ 19:36)        I&O's Detail    30 Sep 2023 07:01  -  01 Oct 2023 07:00  --------------------------------------------------------  IN:    IV PiggyBack: 150 mL    IV PiggyBack: 50 mL    IV PiggyBack: 100 mL    Norepinephrine: 76.2 mL    Sodium Chloride 0.9% Bolus: 500 mL  Total IN: 876.2 mL    OUT:    Voided (mL): 0 mL  Total OUT: 0 mL    Total NET: 876.2 mL          Physical Examination:  GENERAL:               Alert, Oriented, No acute distress.    HEENT:                    Pupils equal, reactive to light.  Symmetric. No JVD, Moist MM  PULM:                     Bilateral air entry, Clear to auscultation bilaterally, no significant sputum production, No Rales, No Rhonchi, No Wheezing  CVS:                         S1, S2,  No Murmur  ABD:                        Soft, nondistended, nontender, normoactive bowel sounds,   EXT:                         No edema, nontender, No Cyanosis or Clubbing   Vascular:                Warm Extremities, Normal Capillary refill, Normal Distal Pulses  SKIN:                       Warm and well perfused, no rashes noted.   NEURO:                  Alert, oriented, interactive, nonfocal, follows commands  PSYC:                      Calm, + Insight.       Addendum to H&P/ICU Consult note  - Patient seen and examined today    ICU CONSULT  Location of Patient : [facility] CCU1 0010 02 ( CCU1)  Attending requesting Consult:Maurisio Davidson  Chief Complaint :  Fatigue  Reason For consult : Shock      Initial HPI on admission:  HPI:  77 yo M pmhx ESRD came to ED complaining of fatigue. Patient noted to be febrile to 102.2 in ED with complains of cough and wheezing. Received 1l NS bolus and noted to be hypotensive to 80's systolic. At bedside patient is oriented to self and place. Complains of some abdominal pain but is unsure why he is in the hospital. Poor historian and unable to fully participate in ROS (30 Sep 2023 22:43)    Overnight admitted to ICU, remains lethargic, requiing pressors  patinet opens eyes to touch but is not verbal and unable to provide history or ROS     PAST MEDICAL & SURGICAL HISTORY:  Chronic atrial fibrillation  History of BPH  CAD (coronary artery disease)  Coronary stent patent  ESRD on dialysis  History of GI bleed  Mitral valve replaced        Allergies  levofloxacin (Unknown)  alfuzosin (Unknown)  tamsulosin (Unknown)  Myrbetriq (Unknown)    Due to current medical status patient unable to provide medical, social, family history.  History obtained from available medical record.       Medications:  MEDICATIONS  (STANDING):  influenza  Vaccine (HIGH DOSE) 0.7 milliLiter(s) IntraMuscular once  norepinephrine Infusion 0.05 MICROgram(s)/kG/Min (6.81 mL/Hr) IV Continuous <Continuous>  piperacillin/tazobactam IVPB.. 3.375 Gram(s) IV Intermittent every 12 hours    MEDICATIONS  (PRN):      Antibiotics History  cefepime   IVPB 1000 milliGRAM(s) IV Intermittent once, 09-30-23 @ 19:12, Stop order after: 1 Doses  piperacillin/tazobactam IVPB.. 3.375 Gram(s) IV Intermittent every 12 hours, 09-30-23 @ 22:40, Stop order after: 7 Days  vancomycin  IVPB. 1000 milliGRAM(s) IV Intermittent once, 09-30-23 @ 19:35, Stop order after: 1 Doses        Home Medications:  Last Order Reconciliation Date: Not Done        LABS:                        9.4    8.07  )-----------( 60       ( 01 Oct 2023 05:30 )             29.3     10-01    136  |  103  |  48<H>  ----------------------------<  89  3.2<L>   |  24  |  3.42<H>    Ca    7.3<L>      01 Oct 2023 05:30  Phos  3.5     10-01  Mg     1.2     10-01    TPro  6.0  /  Alb  2.0<L>  /  TBili  0.8  /  DBili  x   /  AST  67<H>  /  ALT  63<H>  /  AlkPhos  162<H>  10-01  PT/INR - ( 30 Sep 2023 19:15 )   PT: 29.8 sec;   INR: 2.69 ratio      COVID  09-30-23 @ 19:15  COVID -   NotDetec       WBC Trend  10-01-23 @ 05:30   -  8.07  09-30-23 @ 19:15   -  11.13<H>    H/H Trend  10-01-23 @ 05:30   -   9.4<L>/ 29.3<L>  09-30-23 @ 19:15   -   9.4<L>/ 28.6<L>    Stool Occult Blood    Platelet Trend  10-01-23 @ 05:30   -  60<L>  09-30-23 @ 19:15   -  93<L>    Trend Sodium  10-01-23 @ 05:30   -  136  09-30-23 @ 19:15   -  133<L>    Trend Potassium  10-01-23 @ 05:30   -  3.2<L>  09-30-23 @ 19:15   -  3.0<L>    Trend Bun/Cr  10-01-23 @ 05:30  BUN/CR -  48<H> / 3.42<H>  09-30-23 @ 19:15  BUN/CR -  45<H> / 3.59<H>    Lactic Acid Trend  10-01-23 @ 05:30   -   1.6  09-30-23 @ 19:15   -   1.1    ABG Trend    Trend AST/ALT/ALK Phos/Bili  10-01-23 @ 05:30   67<H>/63<H>/162<H>/0.8  09-30-23 @ 19:15   86<H>/74<H>/207<H>/0.8       Albumin Trend  10-01-23 @ 05:30   -   2.0<L>  09-30-23 @ 19:15   -   2.4<L>      PTT - PT - INR Trend  09-30-23 @ 19:15   -   35.6 - 29.8<H> - 2.69<H>    Glucose Trend  10-01-23 @ 05:30   -  89 -- --  09-30-23 @ 19:15   -  113<H> -- --          VITALS:  T(C): 36.6 (10-01-23 @ 07:00), Max: 39.2 (09-30-23 @ 19:17)  T(F): 97.8 (10-01-23 @ 07:00), Max: 102.6 (09-30-23 @ 19:17)  HR: 115 (10-01-23 @ 07:00) (71 - 120)  BP: 97/61 (10-01-23 @ 07:00) (73/41 - 126/67)  BP(mean): 70 (10-01-23 @ 07:00) (53 - 85)  ABP: --  ABP(mean): --  RR: 17 (10-01-23 @ 07:00) (8 - 21)  SpO2: 98% (10-01-23 @ 07:00) (94% - 100%)  CVP(mm Hg): --  CVP(cm H2O): --    Ins and Outs     09-30-23 @ 07:01  -  10-01-23 @ 07:00  --------------------------------------------------------  IN: 876.2 mL / OUT: 0 mL / NET: 876.2 mL        Height (cm): 172.7 (09-30-23 @ 19:00)  Weight (kg): 72.6 (09-30-23 @ 19:36)  BMI (kg/m2): 24.3 (09-30-23 @ 19:36)        I&O's Detail    30 Sep 2023 07:01  -  01 Oct 2023 07:00  --------------------------------------------------------  IN:    IV PiggyBack: 150 mL    IV PiggyBack: 50 mL    IV PiggyBack: 100 mL    Norepinephrine: 76.2 mL    Sodium Chloride 0.9% Bolus: 500 mL  Total IN: 876.2 mL    OUT:    Voided (mL): 0 mL  Total OUT: 0 mL    Total NET: 876.2 mL      Addendum to H&P/ICU Consult note  - Patient seen and examined today    ICU CONSULT  Location of Patient : [facility] CCU1 0010 02 ( CCU1)  Attending requesting Consult:Maurisio Davidson  Chief Complaint :  Fatigue  Reason For consult : Shock      Initial HPI on admission:  HPI:  79 yo M pmhx ESRD came to ED complaining of fatigue. Patient noted to be febrile to 102.2 in ED with complains of cough and wheezing. Received 1l NS bolus and noted to be hypotensive to 80's systolic. At bedside patient is oriented to self and place. Complains of some abdominal pain but is unsure why he is in the hospital. Poor historian and unable to fully participate in ROS (30 Sep 2023 22:43)    Overnight admitted to ICU, remains lethargic, requiing pressors  patinet opens eyes to touch but is not verbal and unable to provide history or ROS     PAST MEDICAL & SURGICAL HISTORY:  Chronic atrial fibrillation  History of BPH  CAD (coronary artery disease)  Coronary stent patent  ESRD on dialysis  History of GI bleed  Mitral valve replaced        Allergies  levofloxacin (Unknown)  alfuzosin (Unknown)  tamsulosin (Unknown)  Myrbetriq (Unknown)    Due to current medical status patient unable to provide medical, social, family history.  History obtained from available medical record.       Medications:  MEDICATIONS  (STANDING):  influenza  Vaccine (HIGH DOSE) 0.7 milliLiter(s) IntraMuscular once  norepinephrine Infusion 0.05 MICROgram(s)/kG/Min (6.81 mL/Hr) IV Continuous <Continuous>  piperacillin/tazobactam IVPB.. 3.375 Gram(s) IV Intermittent every 12 hours    MEDICATIONS  (PRN):      Antibiotics History  cefepime   IVPB 1000 milliGRAM(s) IV Intermittent once, 09-30-23 @ 19:12, Stop order after: 1 Doses  piperacillin/tazobactam IVPB.. 3.375 Gram(s) IV Intermittent every 12 hours, 09-30-23 @ 22:40, Stop order after: 7 Days  vancomycin  IVPB. 1000 milliGRAM(s) IV Intermittent once, 09-30-23 @ 19:35, Stop order after: 1 Doses        Home Medications:  Last Order Reconciliation Date: Not Done        LABS:                        9.4    8.07  )-----------( 60       ( 01 Oct 2023 05:30 )             29.3     10-01    136  |  103  |  48<H>  ----------------------------<  89  3.2<L>   |  24  |  3.42<H>    Ca    7.3<L>      01 Oct 2023 05:30  Phos  3.5     10-01  Mg     1.2     10-01    TPro  6.0  /  Alb  2.0<L>  /  TBili  0.8  /  DBili  x   /  AST  67<H>  /  ALT  63<H>  /  AlkPhos  162<H>  10-01  PT/INR - ( 30 Sep 2023 19:15 )   PT: 29.8 sec;   INR: 2.69 ratio      COVID  09-30-23 @ 19:15  COVID -   NotDetec       WBC Trend  10-01-23 @ 05:30   -  8.07  09-30-23 @ 19:15   -  11.13<H>    H/H Trend  10-01-23 @ 05:30   -   9.4<L>/ 29.3<L>  09-30-23 @ 19:15   -   9.4<L>/ 28.6<L>    Stool Occult Blood    Platelet Trend  10-01-23 @ 05:30   -  60<L>  09-30-23 @ 19:15   -  93<L>    Trend Sodium  10-01-23 @ 05:30   -  136  09-30-23 @ 19:15   -  133<L>    Trend Potassium  10-01-23 @ 05:30   -  3.2<L>  09-30-23 @ 19:15   -  3.0<L>    Trend Bun/Cr  10-01-23 @ 05:30  BUN/CR -  48<H> / 3.42<H>  09-30-23 @ 19:15  BUN/CR -  45<H> / 3.59<H>    Lactic Acid Trend  10-01-23 @ 05:30   -   1.6  09-30-23 @ 19:15   -   1.1    ABG Trend    Trend AST/ALT/ALK Phos/Bili  10-01-23 @ 05:30   67<H>/63<H>/162<H>/0.8  09-30-23 @ 19:15   86<H>/74<H>/207<H>/0.8       Albumin Trend  10-01-23 @ 05:30   -   2.0<L>  09-30-23 @ 19:15   -   2.4<L>      PTT - PT - INR Trend  09-30-23 @ 19:15   -   35.6 - 29.8<H> - 2.69<H>    Glucose Trend  10-01-23 @ 05:30   -  89 -- --  09-30-23 @ 19:15   -  113<H> -- --          VITALS:  T(C): 36.6 (10-01-23 @ 07:00), Max: 39.2 (09-30-23 @ 19:17)  T(F): 97.8 (10-01-23 @ 07:00), Max: 102.6 (09-30-23 @ 19:17)  HR: 115 (10-01-23 @ 07:00) (71 - 120)  BP: 97/61 (10-01-23 @ 07:00) (73/41 - 126/67)  BP(mean): 70 (10-01-23 @ 07:00) (53 - 85)  ABP: --  ABP(mean): --  RR: 17 (10-01-23 @ 07:00) (8 - 21)  SpO2: 98% (10-01-23 @ 07:00) (94% - 100%)  CVP(mm Hg): --  CVP(cm H2O): --    Ins and Outs     09-30-23 @ 07:01  -  10-01-23 @ 07:00  --------------------------------------------------------  IN: 876.2 mL / OUT: 0 mL / NET: 876.2 mL        Height (cm): 172.7 (09-30-23 @ 19:00)  Weight (kg): 72.6 (09-30-23 @ 19:36)  BMI (kg/m2): 24.3 (09-30-23 @ 19:36)        I&O's Detail    30 Sep 2023 07:01  -  01 Oct 2023 07:00  --------------------------------------------------------  IN:    IV PiggyBack: 150 mL    IV PiggyBack: 50 mL    IV PiggyBack: 100 mL    Norepinephrine: 76.2 mL    Sodium Chloride 0.9% Bolus: 500 mL  Total IN: 876.2 mL    OUT:    Voided (mL): 0 mL  Total OUT: 0 mL    Total NET: 876.2 mL

## 2023-10-01 NOTE — DIETITIAN INITIAL EVALUATION ADULT - WEIGHT (KG)
[facility] [place], [place]                                      OPERATIVE REPORT      NAME:             ALIZE CHAMPION    AGE:          65   ACCT#:            [id number]         :          1953   MR#:              [id number]         ROOM:         Â    ADMIT DATE:       10/16/2018        DIS DATE:     10/16/2018   PT TYPE:          O                 DP:           [id number]   ATTENDING:        LUIS UMANA MD      DICTATING PHYSICIAN:  LUIS UMANA MD      DATE OF OPERATION:  10/16/2018      SURGEON:  Luis Umana MD            CARDIAC ELECTROPHYSIOLOGY PROCEDURE      REFERRING PHYSICIAN:  Valdemar Jolly DO      HISTORY OF PRESENT ILLNESS:  The patient is a 65-year-old woman, admitted to   the hospital as an outpatient for EP study and possible ablation of atrial   flutter.  She has had shortness of breath and palpitations for a while.  She   was found to be in persistent atrial flutter in the Dr. Jolly's office and sent   to me for evaluation.  I discussed the risks and benefits of atrial flutter   ablation in detail with the patient.  She expressed understanding and desired   to proceed.  She has been on uninterrupted Eliquis for more than a month since   the diagnosis.       PROCEDURES PERFORMED:     1. Ablation of SVT, which was right atrial typical flutter.   2. 3D mapping.   3. Induction of arrhythmia with drug infusion.   4. Sedation from 10:03 to 10:23 for a total of hour and 50 minutes.      PROCEDURE IN DETAIL:  The patient was brought to the EP lab in a fasting,   nonsedated state.  Her right and left groin areas were prepped and draped in   usual sterile fashion.  A 2% lidocaine was used for local anesthesia.  Fentanyl   and Versed were  administered by cath lab staff for sedation as needed.       Seldinger technique was used to place an 8-French and a 5-French sheath in the   right femoral vein.  Through the 8-French sheath, a quadripolar catheter was   used to map the right atrium.  Through the 5-French sheath, a decapolar   4-French catheter was placed in the coronary sinus to pace record from the left   atrium.  Activation was concentric in the coronary sinus.  The map of atrial   flutter in the right atrium suggested a counter-clockwise typical atrial   flutter, which was consistent with a 12-lead EKG.  Cycle length was around 245   milliseconds.  Entrainment 220 msec was successful from the isthmus.   Entrainment suggestive from the CS was out of the circuit.  For these reasons,   decision was made to perform isthmus ablation.       The 8-French sheath was exchanged for an Agilis sheath and a Blazer 8 mm   ablation catheter.  A linear lesion was created using the mapping system as   guidance. Lesions were created every 40 seconds at a temperature of 60 degrees   and power of 60 edmondson.  When I completed the lesion at the RA-IVC junction, the   patient converted to sinus rhythm.  I then performed pacing from the coronary   sinus and showed that there was still conduction across the isthmus.  I   performed more ablation at the IVC-RA junction and also then into the tricuspid   valve area and then the patient had isthmus block.  Isthmus conduction time in   both directions was over 140 milliseconds.  Activation time up the lateral wall   from the CS pacing was consistent with isthmus block.  The patient was started   on Isuprel 2 mcg/minute for 20 minutes and at the end of that, isthmus block   was still confirmed.       EP study was performed during the waiting.  AV block was at 450 milliseconds.   AV lico ERP was 600/340, atrial ERP was 600/230.  Burst pacing did not trigger   any SVT.  The patient's AH was 88, HV was 56, , QRS 88, QTc  397.       All catheter and sheaths were removed.  Hemostasis was obtained via manual   pressure.  The patient has full neurovascular recovery.       CONCLUSION:  Successful ablation of right atrial flutter with no acute   complications.       RECOMMENDATION:     1. The patient will continue anticoagulation.   2. She will continue her metoprolol.   3. She will follow up in the office in a month.            ______________________________   Luis Umana MD            [facility]   DP:  [id number]   DD:  10/16/2018 15:46:43   DT:  10/16/2018 20:56:12   Job #:  [id number]                72.5

## 2023-10-01 NOTE — DIETITIAN INITIAL EVALUATION ADULT - NS FNS DIET ORDER
Diet, Renal Restrictions:   For patients receiving Renal Replacement - No Protein Restr, No Conc K, No Conc Phos, Low Sodium (09-30-23 @ 23:51)

## 2023-10-01 NOTE — DIETITIAN INITIAL EVALUATION ADULT - ADD RECOMMEND
1. Add Nepro BID  2. Add Nephrovite   3. Will assess need to downgrade diet texture if mentation does not improve

## 2023-10-01 NOTE — CONSULT NOTE ADULT - SUBJECTIVE AND OBJECTIVE BOX
NEPHROLOGY CONSULTATION    CHIEF COMPLAINT: fever    HPI:  Pt is 77 yo M w/pmh ESRD on HD came to ED from [facility] complaining of fatigue. Patient noted to be febrile in ED with complains of cough and wheezing. Noted to be hypotensive to 80's systolic. Complained of some abdominal pain. Poor historian and unable to fully participate in ROS. Positive blood cultures noted.     ROS:  as above    Allergies:  levofloxacin (Unknown)  alfuzosin (Unknown)  tamsulosin (Unknown)  Myrbetriq (Unknown)    PAST MEDICAL & SURGICAL HISTORY:  Chronic atrial fibrillation  History of BPH  CAD (coronary artery disease)  Coronary stent patent  ESRD on dialysis  History of GI bleed  Mitral valve replaced    SOCIAL HISTORY:  negative    FAMILY HISTORY:  NC    MEDICATIONS  (STANDING):  caspofungin IVPB      influenza  Vaccine (HIGH DOSE) 0.7 milliLiter(s) IntraMuscular once  meropenem  IVPB      norepinephrine Infusion 0.05 MICROgram(s)/kG/Min (6.81 mL/Hr) IV Continuous <Continuous>  pantoprazole    Tablet 40 milliGRAM(s) Oral before breakfast  vancomycin  IVPB        Vital Signs Last 24 Hrs  T(C): 36.6 (10-01-23 @ 07:00), Max: 39.2 (09-30-23 @ 19:17)  T(F): 97.8 (10-01-23 @ 07:00), Max: 102.6 (09-30-23 @ 19:17)  HR: 106 (10-01-23 @ 09:00) (71 - 120)  BP: 105/50 (10-01-23 @ 09:00) (73/41 - 126/67)  BP(mean): 62 (10-01-23 @ 09:00) (53 - 85)  RR: 15 (10-01-23 @ 09:00) (8 - 21)  SpO2: 97% (10-01-23 @ 09:00) (94% - 100%)    I&O's Detail    30 Sep 2023 07:01  -  01 Oct 2023 07:00  --------------------------------------------------------  IN:    IV PiggyBack: 150 mL    IV PiggyBack: 50 mL    IV PiggyBack: 100 mL    Norepinephrine: 76.2 mL    Sodium Chloride 0.9% Bolus: 500 mL  Total IN: 876.2 mL    OUT:    Voided (mL): 0 mL  Total OUT: 0 mL    LABS:                        10.4   10.77 )-----------( 56       ( 01 Oct 2023 09:40 )             31.9     10-01    136  |  103  |  48<H>  ----------------------------<  89  3.2<L>   |  24  |  3.42<H>    Ca    7.3<L>      01 Oct 2023 05:30  Phos  3.5     10-01  Mg     1.2     10-01    TPro  6.0  /  Alb  2.0<L>  /  TBili  0.8  /  DBili  x   /  AST  67<H>  /  ALT  63<H>  /  AlkPhos  162<H>  10-01    LIVER FUNCTIONS - ( 01 Oct 2023 05:30 )  Alb: 2.0 g/dL / Pro: 6.0 g/dL / ALK PHOS: 162 U/L / ALT: 63 U/L / AST: 67 U/L / GGT: x           Culture - Blood (collected 30 Sep 2023 19:15)  Source: .Blood Blood-Peripheral  Gram Stain (01 Oct 2023 11:24):    Growth in anaerobic bottle: Gram Positive Cocci in Clusters    Growth in aerobic bottle: Gram Positive Cocci in Clusters  Preliminary Report (01 Oct 2023 11:25):    Growth in anaerobic bottle: Gram Positive Cocci in Clusters    Growth in aerobic bottle: Gram Positive Cocci in Clusters    Culture - Blood (collected 30 Sep 2023 19:10)  Source: .Blood Blood-Peripheral  Gram Stain (01 Oct 2023 11:02):    Growth in anaerobic bottle: Gram Positive Cocci in Clusters    Growth in aerobic bottle: Gram Positive Cocci in Clusters  Preliminary Report (01 Oct 2023 11:02):    Growth in anaerobic bottle: Gram Positive Cocci in Clusters    Growth in aerobic bottle: Gram Positive Cocci in Clusters    Direct identification is available within approximately 3-5    hours either by Blood Panel Multiplexed PCR or Direct    MALDI-TOF. Details: [email or web address]  Organism: Blood Culture PCR (01 Oct 2023 11:32)  Organism: Blood Culture PCR (01 Oct 2023 11:32)    PT/INR - ( 01 Oct 2023 09:40 )   PT: 21.7 sec;   INR: 1.89 ratio       PTT - ( 01 Oct 2023 09:40 )  PTT:34.6 sec    A/P:    full consult to follow    [phone] NEPHROLOGY CONSULTATION    CHIEF COMPLAINT: fever    HPI:  Pt is 79 yo M w/pmh ESRD on HD came to ED from [facility] complaining of fatigue. Patient noted to be febrile in ED with complains of cough and wheezing. Noted to be hypotensive to 80's systolic. Complained of some abdominal pain. Poor historian and unable to fully participate in ROS. Positive blood cultures noted.     ROS:  as above    Allergies:  levofloxacin (Unknown)  alfuzosin (Unknown)  tamsulosin (Unknown)  Myrbetriq (Unknown)    PAST MEDICAL & SURGICAL HISTORY:  Chronic atrial fibrillation  History of BPH  CAD (coronary artery disease)  Coronary stent patent  ESRD on dialysis  History of GI bleed  Mitral valve replaced    SOCIAL HISTORY:  negative    FAMILY HISTORY:  NC    MEDICATIONS  (STANDING):  caspofungin IVPB      influenza  Vaccine (HIGH DOSE) 0.7 milliLiter(s) IntraMuscular once  meropenem  IVPB      norepinephrine Infusion 0.05 MICROgram(s)/kG/Min (6.81 mL/Hr) IV Continuous <Continuous>  pantoprazole    Tablet 40 milliGRAM(s) Oral before breakfast  vancomycin  IVPB        Vital Signs Last 24 Hrs  T(C): 36.6 (10-01-23 @ 07:00), Max: 39.2 (09-30-23 @ 19:17)  T(F): 97.8 (10-01-23 @ 07:00), Max: 102.6 (09-30-23 @ 19:17)  HR: 106 (10-01-23 @ 09:00) (71 - 120)  BP: 105/50 (10-01-23 @ 09:00) (73/41 - 126/67)  BP(mean): 62 (10-01-23 @ 09:00) (53 - 85)  RR: 15 (10-01-23 @ 09:00) (8 - 21)  SpO2: 97% (10-01-23 @ 09:00) (94% - 100%)    I&O's Detail    30 Sep 2023 07:01  -  01 Oct 2023 07:00  --------------------------------------------------------  IN:    IV PiggyBack: 150 mL    IV PiggyBack: 50 mL    IV PiggyBack: 100 mL    Norepinephrine: 76.2 mL    Sodium Chloride 0.9% Bolus: 500 mL  Total IN: 876.2 mL    OUT:    Voided (mL): 0 mL  Total OUT: 0 mL    LABS:                        10.4   10.77 )-----------( 56       ( 01 Oct 2023 09:40 )             31.9     10-01    136  |  103  |  48<H>  ----------------------------<  89  3.2<L>   |  24  |  3.42<H>    Ca    7.3<L>      01 Oct 2023 05:30  Phos  3.5     10-01  Mg     1.2     10-01    TPro  6.0  /  Alb  2.0<L>  /  TBili  0.8  /  DBili  x   /  AST  67<H>  /  ALT  63<H>  /  AlkPhos  162<H>  10-01    LIVER FUNCTIONS - ( 01 Oct 2023 05:30 )  Alb: 2.0 g/dL / Pro: 6.0 g/dL / ALK PHOS: 162 U/L / ALT: 63 U/L / AST: 67 U/L / GGT: x           Culture - Blood (collected 30 Sep 2023 19:15)  Source: .Blood Blood-Peripheral  Gram Stain (01 Oct 2023 11:24):    Growth in anaerobic bottle: Gram Positive Cocci in Clusters    Growth in aerobic bottle: Gram Positive Cocci in Clusters  Preliminary Report (01 Oct 2023 11:25):    Growth in anaerobic bottle: Gram Positive Cocci in Clusters    Growth in aerobic bottle: Gram Positive Cocci in Clusters    Culture - Blood (collected 30 Sep 2023 19:10)  Source: .Blood Blood-Peripheral  Gram Stain (01 Oct 2023 11:02):    Growth in anaerobic bottle: Gram Positive Cocci in Clusters    Growth in aerobic bottle: Gram Positive Cocci in Clusters  Preliminary Report (01 Oct 2023 11:02):    Growth in anaerobic bottle: Gram Positive Cocci in Clusters    Growth in aerobic bottle: Gram Positive Cocci in Clusters    Direct identification is available within approximately 3-5    hours either by Blood Panel Multiplexed PCR or Direct    MALDI-TOF. Details: [email or web address]  Organism: Blood Culture PCR (01 Oct 2023 11:32)  Organism: Blood Culture PCR (01 Oct 2023 11:32)    PT/INR - ( 01 Oct 2023 09:40 )   PT: 21.7 sec;   INR: 1.89 ratio       PTT - ( 01 Oct 2023 09:40 )  PTT:34.6 sec    A/P:    full consult to follow    [phone] NEPHROLOGY CONSULTATION    CHIEF COMPLAINT: fever    HPI:  Pt is 77 yo M w/pmh ESRD on HD came to ED from [facility] complaining of fatigue. Patient noted to be febrile in ED with complains of cough and wheezing. Noted to be hypotensive to 80's systolic. Complained of some abdominal pain. Poor historian and unable to fully participate in ROS. Positive blood cultures noted.     ROS:  as above    Allergies:  levofloxacin (Unknown)  alfuzosin (Unknown)  tamsulosin (Unknown)  Myrbetriq (Unknown)    PAST MEDICAL & SURGICAL HISTORY:  Chronic atrial fibrillation  History of BPH  CAD (coronary artery disease)  Coronary stent patent  ESRD on dialysis  History of GI bleed  Mitral valve replaced    SOCIAL HISTORY:  negative    FAMILY HISTORY:  NC    MEDICATIONS  (STANDING):  caspofungin IVPB      influenza  Vaccine (HIGH DOSE) 0.7 milliLiter(s) IntraMuscular once  meropenem  IVPB      norepinephrine Infusion 0.05 MICROgram(s)/kG/Min (6.81 mL/Hr) IV Continuous <Continuous>  pantoprazole    Tablet 40 milliGRAM(s) Oral before breakfast  vancomycin  IVPB        Vital Signs Last 24 Hrs  T(C): 36.6 (10-01-23 @ 07:00), Max: 39.2 (09-30-23 @ 19:17)  T(F): 97.8 (10-01-23 @ 07:00), Max: 102.6 (09-30-23 @ 19:17)  HR: 106 (10-01-23 @ 09:00) (71 - 120)  BP: 105/50 (10-01-23 @ 09:00) (73/41 - 126/67)  BP(mean): 62 (10-01-23 @ 09:00) (53 - 85)  RR: 15 (10-01-23 @ 09:00) (8 - 21)  SpO2: 97% (10-01-23 @ 09:00) (94% - 100%)    I&O's Detail    30 Sep 2023 07:01  -  01 Oct 2023 07:00  --------------------------------------------------------  IN:    IV PiggyBack: 150 mL    IV PiggyBack: 50 mL    IV PiggyBack: 100 mL    Norepinephrine: 76.2 mL    Sodium Chloride 0.9% Bolus: 500 mL  Total IN: 876.2 mL    OUT:    Voided (mL): 0 mL  Total OUT: 0 mL    LABS:                        10.4   10.77 )-----------( 56       ( 01 Oct 2023 09:40 )             31.9     10-01    136  |  103  |  48<H>  ----------------------------<  89  3.2<L>   |  24  |  3.42<H>    Ca    7.3<L>      01 Oct 2023 05:30  Phos  3.5     10-01  Mg     1.2     10-01    TPro  6.0  /  Alb  2.0<L>  /  TBili  0.8  /  DBili  x   /  AST  67<H>  /  ALT  63<H>  /  AlkPhos  162<H>  10-01    LIVER FUNCTIONS - ( 01 Oct 2023 05:30 )  Alb: 2.0 g/dL / Pro: 6.0 g/dL / ALK PHOS: 162 U/L / ALT: 63 U/L / AST: 67 U/L / GGT: x           Culture - Blood (collected 30 Sep 2023 19:15)  Source: .Blood Blood-Peripheral  Gram Stain (01 Oct 2023 11:24):    Growth in anaerobic bottle: Gram Positive Cocci in Clusters    Growth in aerobic bottle: Gram Positive Cocci in Clusters  Preliminary Report (01 Oct 2023 11:25):    Growth in anaerobic bottle: Gram Positive Cocci in Clusters    Growth in aerobic bottle: Gram Positive Cocci in Clusters    Culture - Blood (collected 30 Sep 2023 19:10)  Source: .Blood Blood-Peripheral  Gram Stain (01 Oct 2023 11:02):    Growth in anaerobic bottle: Gram Positive Cocci in Clusters    Growth in aerobic bottle: Gram Positive Cocci in Clusters  Preliminary Report (01 Oct 2023 11:02):    Growth in anaerobic bottle: Gram Positive Cocci in Clusters    Growth in aerobic bottle: Gram Positive Cocci in Clusters    Direct identification is available within approximately 3-5    hours either by Blood Panel Multiplexed PCR or Direct    MALDI-TOF. Details: [email or web address]  Organism: Blood Culture PCR (01 Oct 2023 11:32)  Organism: Blood Culture PCR (01 Oct 2023 11:32)    PT/INR - ( 01 Oct 2023 09:40 )   PT: 21.7 sec;   INR: 1.89 ratio       PTT - ( 01 Oct 2023 09:40 )  PTT:34.6 sec    A/P:    full consult to follow    [phone] NEPHROLOGY CONSULTATION    CHIEF COMPLAINT: fever    HPI:  Pt is 77 yo M w/pmh ESRD on HD came to ED from [facility] complaining of fatigue. Patient noted to be febrile in ED with complains of cough and wheezing. Noted to be hypotensive to 80's systolic. Poor historian. Denies CP, N/V. Positive blood cultures noted. Completed HD on Friday at .    ROS:  as above    Allergies:  levofloxacin (Unknown)  alfuzosin (Unknown)  tamsulosin (Unknown)  Myrbetriq (Unknown)    PAST MEDICAL & SURGICAL HISTORY:  Chronic atrial fibrillation  History of BPH  CAD (coronary artery disease)  Coronary stent patent  ESRD on dialysis  History of GI bleed  Mitral valve replaced    SOCIAL HISTORY:  negative    FAMILY HISTORY:  NC    MEDICATIONS  (STANDING):  caspofungin IVPB      influenza  Vaccine (HIGH DOSE) 0.7 milliLiter(s) IntraMuscular once  meropenem  IVPB      norepinephrine Infusion 0.05 MICROgram(s)/kG/Min (6.81 mL/Hr) IV Continuous <Continuous>  pantoprazole    Tablet 40 milliGRAM(s) Oral before breakfast  vancomycin  IVPB        Vital Signs Last 24 Hrs  T(C): 36.6 (10-01-23 @ 07:00), Max: 39.2 (09-30-23 @ 19:17)  T(F): 97.8 (10-01-23 @ 07:00), Max: 102.6 (09-30-23 @ 19:17)  HR: 106 (10-01-23 @ 09:00) (71 - 120)  BP: 105/50 (10-01-23 @ 09:00) (73/41 - 126/67)  BP(mean): 62 (10-01-23 @ 09:00) (53 - 85)  RR: 15 (10-01-23 @ 09:00) (8 - 21)  SpO2: 97% (10-01-23 @ 09:00) (94% - 100%)    I&O's Detail    30 Sep 2023 07:01  -  01 Oct 2023 07:00  --------------------------------------------------------  IN:    IV PiggyBack: 150 mL    IV PiggyBack: 50 mL    IV PiggyBack: 100 mL    Norepinephrine: 76.2 mL    Sodium Chloride 0.9% Bolus: 500 mL  Total IN: 876.2 mL    OUT:    Voided (mL): 0 mL  Total OUT: 0 mL    s1s2  b/l air entry  soft, ND  sm edema, LUE AVF + bruit     LABS:                        10.4   10.77 )-----------( 56       ( 01 Oct 2023 09:40 )             31.9     10-01    136  |  103  |  48<H>  ----------------------------<  89  3.2<L>   |  24  |  3.42<H>    Ca    7.3<L>      01 Oct 2023 05:30  Phos  3.5     10-01  Mg     1.2     10-01    TPro  6.0  /  Alb  2.0<L>  /  TBili  0.8  /  DBili  x   /  AST  67<H>  /  ALT  63<H>  /  AlkPhos  162<H>  10-01    LIVER FUNCTIONS - ( 01 Oct 2023 05:30 )  Alb: 2.0 g/dL / Pro: 6.0 g/dL / ALK PHOS: 162 U/L / ALT: 63 U/L / AST: 67 U/L / GGT: x           Culture - Blood (collected 30 Sep 2023 19:15)  Source: .Blood Blood-Peripheral  Gram Stain (01 Oct 2023 11:24):    Growth in anaerobic bottle: Gram Positive Cocci in Clusters    Growth in aerobic bottle: Gram Positive Cocci in Clusters  Preliminary Report (01 Oct 2023 11:25):    Growth in anaerobic bottle: Gram Positive Cocci in Clusters    Growth in aerobic bottle: Gram Positive Cocci in Clusters    Culture - Blood (collected 30 Sep 2023 19:10)  Source: .Blood Blood-Peripheral  Gram Stain (01 Oct 2023 11:02):    Growth in anaerobic bottle: Gram Positive Cocci in Clusters    Growth in aerobic bottle: Gram Positive Cocci in Clusters  Preliminary Report (01 Oct 2023 11:02):    Growth in anaerobic bottle: Gram Positive Cocci in Clusters    Growth in aerobic bottle: Gram Positive Cocci in Clusters    Direct identification is available within approximately 3-5    hours either by Blood Panel Multiplexed PCR or Direct    MALDI-TOF. Details: [email or web address]  Organism: Blood Culture PCR (01 Oct 2023 11:32)  Organism: Blood Culture PCR (01 Oct 2023 11:32)    PT/INR - ( 01 Oct 2023 09:40 )   PT: 21.7 sec;   INR: 1.89 ratio       PTT - ( 01 Oct 2023 09:40 )  PTT:34.6 sec    A/P:    ESRD on HD MWF, Afib, CM, EF 45 - 50%  Adm w/MRSA bacteremia  ID f/u  Has perm cath  Pls arrange w/surgery to have perm cath d/c-d  Abx, pressor support  Will eval AVF for possible use  If AVF not ready, will need temp HD cath  Will f/u BMP  Will try to hold off on HD for another day or two if possible   D/w ICU team    [phone] NEPHROLOGY CONSULTATION    CHIEF COMPLAINT: fever    HPI:  Pt is 77 yo M w/pmh ESRD on HD came to ED from [facility] complaining of fatigue. Patient noted to be febrile in ED with complains of cough and wheezing. Noted to be hypotensive to 80's systolic. Poor historian. Denies CP, N/V. Positive blood cultures noted. Completed HD on Friday at .    ROS:  as above    Allergies:  levofloxacin (Unknown)  alfuzosin (Unknown)  tamsulosin (Unknown)  Myrbetriq (Unknown)    PAST MEDICAL & SURGICAL HISTORY:  Chronic atrial fibrillation  History of BPH  CAD (coronary artery disease)  Coronary stent patent  ESRD on dialysis  History of GI bleed  Mitral valve replaced    SOCIAL HISTORY:  negative    FAMILY HISTORY:  NC    MEDICATIONS  (STANDING):  caspofungin IVPB      influenza  Vaccine (HIGH DOSE) 0.7 milliLiter(s) IntraMuscular once  meropenem  IVPB      norepinephrine Infusion 0.05 MICROgram(s)/kG/Min (6.81 mL/Hr) IV Continuous <Continuous>  pantoprazole    Tablet 40 milliGRAM(s) Oral before breakfast  vancomycin  IVPB        Vital Signs Last 24 Hrs  T(C): 36.6 (10-01-23 @ 07:00), Max: 39.2 (09-30-23 @ 19:17)  T(F): 97.8 (10-01-23 @ 07:00), Max: 102.6 (09-30-23 @ 19:17)  HR: 106 (10-01-23 @ 09:00) (71 - 120)  BP: 105/50 (10-01-23 @ 09:00) (73/41 - 126/67)  BP(mean): 62 (10-01-23 @ 09:00) (53 - 85)  RR: 15 (10-01-23 @ 09:00) (8 - 21)  SpO2: 97% (10-01-23 @ 09:00) (94% - 100%)    I&O's Detail    30 Sep 2023 07:01  -  01 Oct 2023 07:00  --------------------------------------------------------  IN:    IV PiggyBack: 150 mL    IV PiggyBack: 50 mL    IV PiggyBack: 100 mL    Norepinephrine: 76.2 mL    Sodium Chloride 0.9% Bolus: 500 mL  Total IN: 876.2 mL    OUT:    Voided (mL): 0 mL  Total OUT: 0 mL    s1s2  b/l air entry  soft, ND  sm edema, LUE AVF + bruit     LABS:                        10.4   10.77 )-----------( 56       ( 01 Oct 2023 09:40 )             31.9     10-01    136  |  103  |  48<H>  ----------------------------<  89  3.2<L>   |  24  |  3.42<H>    Ca    7.3<L>      01 Oct 2023 05:30  Phos  3.5     10-01  Mg     1.2     10-01    TPro  6.0  /  Alb  2.0<L>  /  TBili  0.8  /  DBili  x   /  AST  67<H>  /  ALT  63<H>  /  AlkPhos  162<H>  10-01    LIVER FUNCTIONS - ( 01 Oct 2023 05:30 )  Alb: 2.0 g/dL / Pro: 6.0 g/dL / ALK PHOS: 162 U/L / ALT: 63 U/L / AST: 67 U/L / GGT: x           Culture - Blood (collected 30 Sep 2023 19:15)  Source: .Blood Blood-Peripheral  Gram Stain (01 Oct 2023 11:24):    Growth in anaerobic bottle: Gram Positive Cocci in Clusters    Growth in aerobic bottle: Gram Positive Cocci in Clusters  Preliminary Report (01 Oct 2023 11:25):    Growth in anaerobic bottle: Gram Positive Cocci in Clusters    Growth in aerobic bottle: Gram Positive Cocci in Clusters    Culture - Blood (collected 30 Sep 2023 19:10)  Source: .Blood Blood-Peripheral  Gram Stain (01 Oct 2023 11:02):    Growth in anaerobic bottle: Gram Positive Cocci in Clusters    Growth in aerobic bottle: Gram Positive Cocci in Clusters  Preliminary Report (01 Oct 2023 11:02):    Growth in anaerobic bottle: Gram Positive Cocci in Clusters    Growth in aerobic bottle: Gram Positive Cocci in Clusters    Direct identification is available within approximately 3-5    hours either by Blood Panel Multiplexed PCR or Direct    MALDI-TOF. Details: [email or web address]  Organism: Blood Culture PCR (01 Oct 2023 11:32)  Organism: Blood Culture PCR (01 Oct 2023 11:32)    PT/INR - ( 01 Oct 2023 09:40 )   PT: 21.7 sec;   INR: 1.89 ratio       PTT - ( 01 Oct 2023 09:40 )  PTT:34.6 sec    A/P:    ESRD on HD MWF, Afib, CM, EF 45 - 50%  Adm w/MRSA bacteremia  ID f/u  Has perm cath  Pls arrange w/surgery to have perm cath d/c-d  Abx, pressor support  Will eval AVF for possible use  If AVF not ready, will need temp HD cath  Will f/u BMP  Will try to hold off on HD for another day or two if possible   D/w ICU team    [phone] NEPHROLOGY CONSULTATION    CHIEF COMPLAINT: fever    HPI:  Pt is 79 yo M w/pmh ESRD on HD came to ED from [facility] complaining of fatigue. Patient noted to be febrile in ED with complains of cough and wheezing. Noted to be hypotensive to 80's systolic. Poor historian. Denies CP, N/V. Positive blood cultures noted. Completed HD on Friday at .    ROS:  as above    Allergies:  levofloxacin (Unknown)  alfuzosin (Unknown)  tamsulosin (Unknown)  Myrbetriq (Unknown)    PAST MEDICAL & SURGICAL HISTORY:  Chronic atrial fibrillation  History of BPH  CAD (coronary artery disease)  Coronary stent patent  ESRD on dialysis  History of GI bleed  Mitral valve replaced    SOCIAL HISTORY:  negative    FAMILY HISTORY:  NC    MEDICATIONS  (STANDING):  caspofungin IVPB      influenza  Vaccine (HIGH DOSE) 0.7 milliLiter(s) IntraMuscular once  meropenem  IVPB      norepinephrine Infusion 0.05 MICROgram(s)/kG/Min (6.81 mL/Hr) IV Continuous <Continuous>  pantoprazole    Tablet 40 milliGRAM(s) Oral before breakfast  vancomycin  IVPB        Vital Signs Last 24 Hrs  T(C): 36.6 (10-01-23 @ 07:00), Max: 39.2 (09-30-23 @ 19:17)  T(F): 97.8 (10-01-23 @ 07:00), Max: 102.6 (09-30-23 @ 19:17)  HR: 106 (10-01-23 @ 09:00) (71 - 120)  BP: 105/50 (10-01-23 @ 09:00) (73/41 - 126/67)  BP(mean): 62 (10-01-23 @ 09:00) (53 - 85)  RR: 15 (10-01-23 @ 09:00) (8 - 21)  SpO2: 97% (10-01-23 @ 09:00) (94% - 100%)    I&O's Detail    30 Sep 2023 07:01  -  01 Oct 2023 07:00  --------------------------------------------------------  IN:    IV PiggyBack: 150 mL    IV PiggyBack: 50 mL    IV PiggyBack: 100 mL    Norepinephrine: 76.2 mL    Sodium Chloride 0.9% Bolus: 500 mL  Total IN: 876.2 mL    OUT:    Voided (mL): 0 mL  Total OUT: 0 mL    s1s2  b/l air entry  soft, ND  sm edema, LUE AVF + bruit     LABS:                        10.4   10.77 )-----------( 56       ( 01 Oct 2023 09:40 )             31.9     10-01    136  |  103  |  48<H>  ----------------------------<  89  3.2<L>   |  24  |  3.42<H>    Ca    7.3<L>      01 Oct 2023 05:30  Phos  3.5     10-01  Mg     1.2     10-01    TPro  6.0  /  Alb  2.0<L>  /  TBili  0.8  /  DBili  x   /  AST  67<H>  /  ALT  63<H>  /  AlkPhos  162<H>  10-01    LIVER FUNCTIONS - ( 01 Oct 2023 05:30 )  Alb: 2.0 g/dL / Pro: 6.0 g/dL / ALK PHOS: 162 U/L / ALT: 63 U/L / AST: 67 U/L / GGT: x           Culture - Blood (collected 30 Sep 2023 19:15)  Source: .Blood Blood-Peripheral  Gram Stain (01 Oct 2023 11:24):    Growth in anaerobic bottle: Gram Positive Cocci in Clusters    Growth in aerobic bottle: Gram Positive Cocci in Clusters  Preliminary Report (01 Oct 2023 11:25):    Growth in anaerobic bottle: Gram Positive Cocci in Clusters    Growth in aerobic bottle: Gram Positive Cocci in Clusters    Culture - Blood (collected 30 Sep 2023 19:10)  Source: .Blood Blood-Peripheral  Gram Stain (01 Oct 2023 11:02):    Growth in anaerobic bottle: Gram Positive Cocci in Clusters    Growth in aerobic bottle: Gram Positive Cocci in Clusters  Preliminary Report (01 Oct 2023 11:02):    Growth in anaerobic bottle: Gram Positive Cocci in Clusters    Growth in aerobic bottle: Gram Positive Cocci in Clusters    Direct identification is available within approximately 3-5    hours either by Blood Panel Multiplexed PCR or Direct    MALDI-TOF. Details: [email or web address]  Organism: Blood Culture PCR (01 Oct 2023 11:32)  Organism: Blood Culture PCR (01 Oct 2023 11:32)    PT/INR - ( 01 Oct 2023 09:40 )   PT: 21.7 sec;   INR: 1.89 ratio       PTT - ( 01 Oct 2023 09:40 )  PTT:34.6 sec    A/P:    ESRD on HD MWF, Afib, CM, EF 45 - 50%  Adm w/MRSA bacteremia  ID f/u  Has perm cath  Pls arrange w/surgery to have perm cath d/c-d  Abx, pressor support  Will eval AVF for possible use  If AVF not ready, will need temp HD cath  Will f/u BMP  Will try to hold off on HD for another day or two if possible   D/w ICU team    [phone]

## 2023-10-01 NOTE — PROGRESS NOTE ADULT - SUBJECTIVE AND OBJECTIVE BOX
F/U Note:    78y Male PMH of ESRD on HD via tunneled catheter admitted with septic shock 2/2 to PNA and/or line sepsis (tunnelled HD catheter he arrived with).  -patient transferred to ICU for AMs and continued hypotension, required initiation of vasopressors    Interval Hx:  -remains on pressor support  -had (+) blood cultures with gram positive cocci in clusters (staph aureus)    Vital Signs Last 24 Hrs  T(C): 37 (01 Oct 2023 18:00), Max: 37.3 (01 Oct 2023 03:00)  T(F): 98.6 (01 Oct 2023 18:00), Max: 99.1 (01 Oct 2023 03:00)  HR: 78 (01 Oct 2023 18:30) (69 - 120)  BP: 109/67 (01 Oct 2023 18:30) (73/41 - 135/82)  BP(mean): 78 (01 Oct 2023 18:30) (53 - 97)  RR: 16 (01 Oct 2023 18:30) (8 - 24)  SpO2: 99% (01 Oct 2023 18:30) (92% - 100%)    Parameters below as of 01 Oct 2023 18:00  Patient On (Oxygen Delivery Method): nasal cannula  O2 Flow (L/min): 2                              10.4   10.77 )-----------( 56       ( 01 Oct 2023 09:40 )             31.9         10-01    135  |  99  |  55<H>  ----------------------------<  101<H>  3.4<L>   |  24  |  3.89<H>    Ca    8.3<L>      01 Oct 2023 09:40  Phos  3.5     10-01  Mg     2.3     10-01    TPro  6.0  /  Alb  2.0<L>  /  TBili  0.8  /  DBili  x   /  AST  67<H>  /  ALT  63<H>  /  AlkPhos  162<H>  10-01        ROS:  -unable to obtain given AMS        NEURO:AMS noted, no gross defects   CV: (+) S1/S2, rrr, no mrg  RESP: CTA b/l  GI: soft, non tender

## 2023-10-01 NOTE — DIETITIAN INITIAL EVALUATION ADULT - PERTINENT LABORATORY DATA
SSKI Counseling:  I discussed with the patient the risks of SSKI including but not limited to thyroid abnormalities, metallic taste, GI upset, fever, headache, acne, arthralgias, paraesthesias, lymphadenopathy, easy bleeding, arrhythmias, and allergic reaction. 10-01    136  |  103  |  48<H>  ----------------------------<  89  3.2<L>   |  24  |  3.42<H>    Ca    7.3<L>      01 Oct 2023 05:30  Phos  3.5     10-01  Mg     1.2     10-01    TPro  6.0  /  Alb  2.0<L>  /  TBili  0.8  /  DBili  x   /  AST  67<H>  /  ALT  63<H>  /  AlkPhos  162<H>  10-01

## 2023-10-01 NOTE — DIETITIAN INITIAL EVALUATION ADULT - ORAL INTAKE PTA/DIET HISTORY
Unable to obtain hx, pt very lethargic at time of visit. Pt denies any chewing/swallowing issues. Denies food allergie/intolerances.

## 2023-10-01 NOTE — PROGRESS NOTE ADULT - ASSESSMENT
78y Male PMH of ESRD on HD via tunneled catheter admitted with septic shock 2/2 to PNA and/or line sepsis (tunnelled HD catheter he arrived with).  -patient transferred to ICU for AMs and continued hypotension, required initiation of vasopressors    Interval Hx:  -remains on pressor support  -had (+) blood cultures with gram positive cocci in clusters (staph aureus)        PLAN:    -30 cc/kg fluid challenge without improvement in blood pressure  -patient currently on Levophed, will titrate for MAP 65-70  -patient started on midodrine, has helped lwoer pressor need will continue until off pressors  -if remains on pressors by Am would consider starting stress dose steroids   -repeat lactate  -blood/urine/sputum cultures, then initiate broad spectrum antibiotics, given gram (+) cocci in clusters patient is currently on meropenem and vancomycin  -had TTE, no vegitations noted   -follow cultures and narrow antibitoics as able  -would have surgery consult if tunneled cath needs removal as spossible source of sepsis  -K+ remains low at 3.4, will re-0fras BMP, MAG, and phos now will give supplemtsns (likely at half dose given ESRD) if needed  -goal UOP >0.5 cc/kg/hr         CRITICAL CARE TIME SPENT:  45 minutes of critical care time spent providing medical care for patient's acute illness/conditions that impairs at least one vital organ system and/or poses a high risk of imminent or life threatening deterioration in the patient's condition. It includes time spent evaluating and treating the patient's acute illness as well as time spent reviewing labs, radiology, discussing goals of care with patient and/or patient's family, and discussing the case with a multidisciplinary team, including the eICU, in an effort to prevent further life threatening deterioration or end organ damage. This time is independent of any procedures performed.

## 2023-10-01 NOTE — PROGRESS NOTE ADULT - ASSESSMENT
Physical Examination:  GENERAL:               Lethargic, Confused, No acute distress.    HEENT:                   No JVD, Moist MM  PULM:                     Bilateral air entry, Clear to auscultation bilaterally, no significant sputum production, No Rales, No Rhonchi, No Wheezing  CVS:                         S1, S2,  + Murmur  ABD:                        Soft, nondistended, nontender, normoactive bowel sounds,   EXT:                         no edema, nontender, No Cyanosis or Clubbing   Vascular:                Warm Extremities, Normal Capillary refill, Normal Distal Pulses  SKIN:                       Warm and well perfused, no rashes noted.   NEURO:                  minimally i nteractive, nonfocal, follows commands  PSYC:                      Calm, no Insight.        Assessment  Septic shock, suspect line sepsis  AMS- metabolic encephelopathy  Thrombocytopenia, suspect from Sepsis  Undelrying h/o CAD s/p PCI, BPH, ESRD on HD, s/p MVR, Afib      Plan  Continue pressors to maintain BP  Advance diet as tolerated  will broaden antibiotics to virgie/vanoc/caspo  check rvp, mrsa pcr  Check CT head  check TTE r/o endocarditis with h/o MVR  f/u cultures   hold a/c    ID consult   Renal Consult   Heme Consult    No med list in chart, d/w Renal to get med list  Reconcile home meds       PMD:				                   Notified(Date):  Family Updated: 	Surekha [phone]	                                 Date: 10/1/2023 msg left for call back       Sedation & Analgesia:	n/a  Diet/Nutrition:		Diet, Renal Restrictions: For patients receiving Renal Replacement - No Protein Restr, No Conc K, No Conc Phos, Low Sodium (09-30-23 @ 23:51) [Active]  GI PPx:			Protonix  DVT Ppx:		Low plts, suspect will go lower, Venodynes    	RISK                                                          Points  	[ ] Previous VTE                                           	3  	[ ] Thrombophilia                                        	2  	[ ] Lower limb paralysis                              	2   	[ ] Current Cancer                                       	2   	[x ] Immobilization > 24 hrs                        	1  	[x ] ICU/CCU stay > 24 hours                       	1  	[x ] Age > 60                                                   	1  		Total:[3 ]      Activity:		    Head of Bed:               35-45 Deg  Glycemic Control:        n/a    Lines:  PIV  CENTRAL LINE: 	[x ] YES [ ] NO	        //Perm cath present on admission            LOCATION:   	                       DATE INSERTED:   	                    REMOVE:  [ ] YES [x ] NO    A-LINE:  	                [ ] YES [ ] NO                      LOCATION:   	                       DATE INSERTED: 		            REMOVE:  [ ] YES [ ] NO    SU: 		        [ ] YES [ ] NO  		                                       DATE INSERTED:		            REMOVE:  [ ] YES [ ] NO      Restraints may deemed necessary to prevent removal of life-sustaining devices [x  ] YES   [    ]  NO    Disposition:  ICU Care  Goals of Care: JULIANN from 8/30/23 Full code Physical Examination:  GENERAL:               Lethargic, Confused, No acute distress.    HEENT:                   No JVD, Moist MM  PULM:                     Bilateral air entry, Clear to auscultation bilaterally, no significant sputum production, No Rales, No Rhonchi, No Wheezing  CVS:                         S1, S2,  + Murmur  ABD:                        Soft, nondistended, nontender, normoactive bowel sounds,   EXT:                         no edema, nontender, No Cyanosis or Clubbing   Vascular:                Warm Extremities, Normal Capillary refill, Normal Distal Pulses  SKIN:                       Warm and well perfused, no rashes noted.   NEURO:                  minimally i nteractive, nonfocal, follows commands  PSYC:                      Calm, no Insight.        Assessment  Septic shock, suspect line sepsis  AMS- metabolic encephelopathy  Thrombocytopenia, suspect from Sepsis  Undelrying h/o CAD s/p PCI, BPH, ESRD on HD, s/p MVR, Afib      Plan  Continue pressors to maintain BP  Advance diet as tolerated  will broaden antibiotics to virgie/vanoc/caspo  check rvp, mrsa pcr  Check CT head  check TTE r/o endocarditis with h/o MVR  f/u cultures   hold a/c    ID consult   Renal Consult   Heme Consult    No med list in chart, d/w Renal to get med list  Reconcile home meds       PMD:				                   Notified(Date):  Family Updated: 	Surekha [phone]	                                 Date: 10/1/2023 msg left for call back       Sedation & Analgesia:	n/a  Diet/Nutrition:		Diet, Renal Restrictions: For patients receiving Renal Replacement - No Protein Restr, No Conc K, No Conc Phos, Low Sodium (09-30-23 @ 23:51) [Active]  GI PPx:			Protonix  DVT Ppx:		Low plts, suspect will go lower, Venodynes    	RISK                                                          Points  	[ ] Previous VTE                                           	3  	[ ] Thrombophilia                                        	2  	[ ] Lower limb paralysis                              	2   	[ ] Current Cancer                                       	2   	[x ] Immobilization > 24 hrs                        	1  	[x ] ICU/CCU stay > 24 hours                       	1  	[x ] Age > 60                                                   	1  		Total:[3 ]      Activity:		    Head of Bed:               35-45 Deg  Glycemic Control:        n/a    Lines:  PIV  CENTRAL LINE: 	[x ] YES [ ] NO	        //Perm cath present on admission            LOCATION:   	                       DATE INSERTED:   	                    REMOVE:  [ ] YES [x ] NO    A-LINE:  	                [ ] YES [ ] NO                      LOCATION:   	                       DATE INSERTED: 		            REMOVE:  [ ] YES [ ] NO    SU: 		        [ ] YES [ ] NO  		                                       DATE INSERTED:		            REMOVE:  [ ] YES [ ] NO      Restraints may deemed necessary to prevent removal of life-sustaining devices [x  ] YES   [    ]  NO    Disposition:  ICU Care  Goals of Care: JULIANN from 8/30/23 Full code Physical Examination:  GENERAL:               Lethargic, Confused, No acute distress.    HEENT:                   No JVD, Moist MM  PULM:                     Bilateral air entry, Clear to auscultation bilaterally, no significant sputum production, No Rales, No Rhonchi, No Wheezing  CVS:                         S1, S2,  + Murmur  ABD:                        Soft, nondistended, nontender, normoactive bowel sounds,   EXT:                         no edema, nontender, No Cyanosis or Clubbing   Vascular:                Warm Extremities, Normal Capillary refill, Normal Distal Pulses  SKIN:                       Warm and well perfused, no rashes noted.   NEURO:                  minimally i nteractive, nonfocal, follows commands  PSYC:                      Calm, no Insight.        Assessment  Septic shock, suspect line sepsis  AMS- metabolic encephelopathy  Thrombocytopenia, suspect from Sepsis  Undelrying h/o CAD s/p PCI, BPH, ESRD on HD, s/p MVR, Afib      Plan  Continue pressors to maintain BP  Advance diet as tolerated  will broaden antibiotics to virgie/vanoc/caspo  check rvp, mrsa pcr  Check CT head  check TTE r/o endocarditis with h/o MVR  f/u cultures   hold a/c    ID consult   Renal Consult   Heme Consult    No med list in chart, d/w Renal to get med list  Reconcile home meds       PMD:				                   Notified(Date):  Family Updated: 	Surekha [phone]	                                 Date: 10/1/2023 msg left for call back       Sedation & Analgesia:	n/a  Diet/Nutrition:		Diet, Renal Restrictions: For patients receiving Renal Replacement - No Protein Restr, No Conc K, No Conc Phos, Low Sodium (09-30-23 @ 23:51) [Active]  GI PPx:			Protonix  DVT Ppx:		Low plts, suspect will go lower, Venodynes    	RISK                                                          Points  	[ ] Previous VTE                                           	3  	[ ] Thrombophilia                                        	2  	[ ] Lower limb paralysis                              	2   	[ ] Current Cancer                                       	2   	[x ] Immobilization > 24 hrs                        	1  	[x ] ICU/CCU stay > 24 hours                       	1  	[x ] Age > 60                                                   	1  		Total:[3 ]      Activity:		    Head of Bed:               35-45 Deg  Glycemic Control:        n/a    Lines:  PIV  CENTRAL LINE: 	[x ] YES [ ] NO	        //Perm cath present on admission            LOCATION:   	                       DATE INSERTED:   	                    REMOVE:  [ ] YES [x ] NO    A-LINE:  	                [ ] YES [ ] NO                      LOCATION:   	                       DATE INSERTED: 		            REMOVE:  [ ] YES [ ] NO    SU: 		        [ ] YES [ ] NO  		                                       DATE INSERTED:		            REMOVE:  [ ] YES [ ] NO      Restraints may deemed necessary to prevent removal of life-sustaining devices [x  ] YES   [    ]  NO    Disposition:  ICU Care  Goals of Care: JULIANN from 8/30/23 Full code

## 2023-10-02 ENCOUNTER — RESULT REVIEW (OUTPATIENT)
Age: 78
End: 2023-10-02

## 2023-10-02 DIAGNOSIS — D69.6 THROMBOCYTOPENIA, UNSPECIFIED: ICD-10-CM

## 2023-10-02 LAB
ABO RH CONFIRMATION: SIGNIFICANT CHANGE UP
ANION GAP SERPL CALC-SCNC: 10 MMOL/L — SIGNIFICANT CHANGE UP (ref 5–17)
BLD GP AB SCN SERPL QL: SIGNIFICANT CHANGE UP
BUN SERPL-MCNC: 63 MG/DL — HIGH (ref 7–23)
BUN SERPL-MCNC: 75 MG/DL — HIGH (ref 7–23)
CALCIUM SERPL-MCNC: 8.4 MG/DL — SIGNIFICANT CHANGE UP (ref 8.4–10.5)
CALCIUM SERPL-MCNC: 9 MG/DL — SIGNIFICANT CHANGE UP (ref 8.4–10.5)
CHLORIDE SERPL-SCNC: 101 MMOL/L — SIGNIFICANT CHANGE UP (ref 96–108)
CHLORIDE SERPL-SCNC: 98 MMOL/L — SIGNIFICANT CHANGE UP (ref 96–108)
CO2 SERPL-SCNC: 22 MMOL/L — SIGNIFICANT CHANGE UP (ref 22–31)
CO2 SERPL-SCNC: 23 MMOL/L — SIGNIFICANT CHANGE UP (ref 22–31)
CREAT SERPL-MCNC: 4.45 MG/DL — HIGH (ref 0.5–1.3)
CREAT SERPL-MCNC: 4.92 MG/DL — HIGH (ref 0.5–1.3)
EGFR: 11 ML/MIN/1.73M2 — LOW
EGFR: 13 ML/MIN/1.73M2 — LOW
FERRITIN SERPL-MCNC: 1029 NG/ML — HIGH (ref 30–400)
FOLATE SERPL-MCNC: >20 NG/ML — SIGNIFICANT CHANGE UP
GLUCOSE SERPL-MCNC: 88 MG/DL — SIGNIFICANT CHANGE UP (ref 70–99)
GLUCOSE SERPL-MCNC: 97 MG/DL — SIGNIFICANT CHANGE UP (ref 70–99)
HCT VFR BLD CALC: 27.9 % — LOW (ref 39–50)
HGB BLD-MCNC: 9.2 G/DL — LOW (ref 13–17)
IRON SATN MFR SERPL: 18 % — SIGNIFICANT CHANGE UP (ref 16–55)
IRON SATN MFR SERPL: 19 UG/DL — LOW (ref 45–165)
MAGNESIUM SERPL-MCNC: 2.3 MG/DL — SIGNIFICANT CHANGE UP (ref 1.6–2.6)
MCHC RBC-ENTMCNC: 31.1 PG — SIGNIFICANT CHANGE UP (ref 27–34)
MCHC RBC-ENTMCNC: 33 GM/DL — SIGNIFICANT CHANGE UP (ref 32–36)
MCV RBC AUTO: 94.3 FL — SIGNIFICANT CHANGE UP (ref 80–100)
NRBC # BLD: 0 /100 WBCS — SIGNIFICANT CHANGE UP (ref 0–0)
PHOSPHATE SERPL-MCNC: 2.8 MG/DL — SIGNIFICANT CHANGE UP (ref 2.5–4.5)
PHOSPHATE SERPL-MCNC: 3.3 MG/DL — SIGNIFICANT CHANGE UP (ref 2.5–4.5)
PLATELET # BLD AUTO: 50 K/UL — LOW (ref 150–400)
POTASSIUM SERPL-MCNC: 4.3 MMOL/L — SIGNIFICANT CHANGE UP (ref 3.5–5.3)
POTASSIUM SERPL-MCNC: 4.5 MMOL/L — SIGNIFICANT CHANGE UP (ref 3.5–5.3)
POTASSIUM SERPL-SCNC: 4.3 MMOL/L — SIGNIFICANT CHANGE UP (ref 3.5–5.3)
POTASSIUM SERPL-SCNC: 4.5 MMOL/L — SIGNIFICANT CHANGE UP (ref 3.5–5.3)
RBC # BLD: 2.96 M/UL — LOW (ref 4.2–5.8)
RBC # BLD: 2.97 M/UL — LOW (ref 4.2–5.8)
RBC # FLD: 15.9 % — HIGH (ref 10.3–14.5)
RETICS #: 34.2 K/UL — SIGNIFICANT CHANGE UP (ref 25–125)
RETICS/RBC NFR: 1.2 % — SIGNIFICANT CHANGE UP (ref 0.5–2.5)
SODIUM SERPL-SCNC: 130 MMOL/L — LOW (ref 135–145)
SODIUM SERPL-SCNC: 134 MMOL/L — LOW (ref 135–145)
TIBC SERPL-MCNC: 107 UG/DL — LOW (ref 220–430)
UIBC SERPL-MCNC: 88 UG/DL — LOW (ref 110–370)
VANCOMYCIN TROUGH SERPL-MCNC: 15.3 UG/ML — SIGNIFICANT CHANGE UP (ref 10–20)
VANCOMYCIN TROUGH SERPL-MCNC: 34.8 UG/ML — CRITICAL HIGH (ref 10–20)
VIT B12 SERPL-MCNC: 636 PG/ML — SIGNIFICANT CHANGE UP (ref 232–1245)
WBC # BLD: 9.51 K/UL — SIGNIFICANT CHANGE UP (ref 3.8–10.5)
WBC # FLD AUTO: 9.51 K/UL — SIGNIFICANT CHANGE UP (ref 3.8–10.5)

## 2023-10-02 PROCEDURE — 99222 1ST HOSP IP/OBS MODERATE 55: CPT | Mod: 25

## 2023-10-02 PROCEDURE — 99222 1ST HOSP IP/OBS MODERATE 55: CPT

## 2023-10-02 PROCEDURE — 36589 REMOVAL TUNNELED CV CATH: CPT

## 2023-10-02 PROCEDURE — 99497 ADVNCD CARE PLAN 30 MIN: CPT | Mod: 25

## 2023-10-02 PROCEDURE — 88300 SURGICAL PATH GROSS: CPT | Mod: 26

## 2023-10-02 PROCEDURE — 99223 1ST HOSP IP/OBS HIGH 75: CPT

## 2023-10-02 RX ORDER — ACETAMINOPHEN 500 MG
650 TABLET ORAL EVERY 6 HOURS
Refills: 0 | Status: DISCONTINUED | OUTPATIENT
Start: 2023-10-02 | End: 2023-10-18

## 2023-10-02 RX ORDER — NOREPINEPHRINE BITARTRATE/D5W 8 MG/250ML
0.05 PLASTIC BAG, INJECTION (ML) INTRAVENOUS
Qty: 8 | Refills: 0 | Status: DISCONTINUED | OUTPATIENT
Start: 2023-10-02 | End: 2023-10-07

## 2023-10-02 RX ORDER — VANCOMYCIN HCL 1 G
1000 VIAL (EA) INTRAVENOUS EVERY 24 HOURS
Refills: 0 | Status: DISCONTINUED | OUTPATIENT
Start: 2023-10-03 | End: 2023-10-03

## 2023-10-02 RX ADMIN — Medication 650 MILLIGRAM(S): at 23:29

## 2023-10-02 RX ADMIN — Medication 250 MILLIGRAM(S): at 08:51

## 2023-10-02 RX ADMIN — Medication 1000 MILLIGRAM(S): at 21:08

## 2023-10-02 RX ADMIN — MIDODRINE HYDROCHLORIDE 10 MILLIGRAM(S): 2.5 TABLET ORAL at 21:07

## 2023-10-02 RX ADMIN — Medication 650 MILLIGRAM(S): at 22:59

## 2023-10-02 RX ADMIN — Medication 1 SUPPOSITORY(S): at 17:47

## 2023-10-02 RX ADMIN — MIDODRINE HYDROCHLORIDE 10 MILLIGRAM(S): 2.5 TABLET ORAL at 06:10

## 2023-10-02 RX ADMIN — Medication 50 MILLILITER(S): at 02:00

## 2023-10-02 RX ADMIN — MEROPENEM 100 MILLIGRAM(S): 1 INJECTION INTRAVENOUS at 08:51

## 2023-10-02 RX ADMIN — PANTOPRAZOLE SODIUM 40 MILLIGRAM(S): 20 TABLET, DELAYED RELEASE ORAL at 06:10

## 2023-10-02 RX ADMIN — Medication 50 MILLILITER(S): at 17:46

## 2023-10-02 RX ADMIN — Medication 1 SUPPOSITORY(S): at 06:09

## 2023-10-02 RX ADMIN — Medication 137 MICROGRAM(S): at 06:10

## 2023-10-02 RX ADMIN — Medication 50 MILLILITER(S): at 08:51

## 2023-10-02 NOTE — PROGRESS NOTE ADULT - NUTRITIONAL ASSESSMENT
This patient has been assessed with a concern for Malnutrition and has been determined to have a diagnosis/diagnoses of Moderate protein-calorie malnutrition.    This patient is being managed with:   Diet NPO-  Entered: Oct  2 2023  9:41AM

## 2023-10-02 NOTE — CONSULT NOTE ADULT - ASSESSMENT
Patient is a 78y male with a past history of A Fib, ESRD, CAD, Mitral valve replacement who presented to [facility] on 9/30 in evening with fever and hypotension.  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  He has a tunnelled catheter, he is encephalopathic, unclear how long he has been on dialysis. He is awaiting a vascular consult regarding line.  High grade MRSA bacteremia. Limited history at this point. He has a tunnelled HDC and a prosthetic mitral valve.  Suggest:  1 Favor catheter removal  2 Blood cultures q 48 hours until clear  3 Vanco per levels  4 Favor d/c of meropenem  5. Will need to consider ERENDIRA at some point in w/u as well as brain imaging if mental status does not return to normal or if he develops any focal findings.  6 Will try to review old chart, different medical record number Patient is a 78y male with a past history of A Fib, ESRD, CAD, Mitral valve replacement who presented to [facility] on 9/30 in evening with fever and hypotension.  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  He has a tunnelled catheter, he is encephalopathic, unclear how long he has been on dialysis. He is awaiting a vascular consult regarding line.  High grade MRSA bacteremia. Limited history at this point. He has a tunnelled HDC and a prosthetic mitral valve.  Suggest:  1 Favor catheter removal  2 Blood cultures q 48 hours until clear  3 Vanco per levels  4 Favor d/c of meropenem  5. Will need to consider ERENDIRA at some point in w/u as well as brain imaging if mental status does not return to normal or if he develops any focal findings.  6 Will try to review old chart, different medical record number Patient is a 78y male with a past history of A Fib, ESRD, CAD, Mitral valve replacement who presented to [facility] on 9/30 in evening with fever and hypotension.  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  He has a tunnelled catheter, he is encephalopathic, unclear how long he has been on dialysis. He is awaiting a vascular consult regarding line.  High grade MRSA bacteremia. Limited history at this point. He has a tunnelled HDC and a prosthetic mitral valve.  Suggest:  1 Favor catheter removal  2 Blood cultures q 48 hours until clear  3 Vanco per levels  4 Favor d/c of meropenem  5. Will need to consider ERENDIRA at some point in w/u as well as brain imaging if mental status does not return to normal or if he develops any focal findings.  6 Will try to review old chart, different medical record number Patient is a 78y male with a past history of A Fib, ESRD, CAD, Mitral valve replacement who presented to [facility] on 9/30 in evening with fever and hypotension.  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  He has a tunnelled catheter, he is encephalopathic, unclear how long he has been on dialysis. He is awaiting a vascular consult regarding line.  High grade MRSA bacteremia. Limited history at this point. He has a tunnelled HDC and a prosthetic mitral valve.  Suggest:  1 Favor catheter removal  2 Blood cultures q 48 hours until clear  3 Vanco per levels  4 Favor d/c of meropenem  5. Will need to consider ERENDIRA at some point in w/u as well as brain imaging if mental status does not return to normal or if he develops any focal findings.  6 Will try to review old chart, different medical record number    Addendum: Chart from [facility] in August reviewed.  MSSA bacteremia March 2023, PPM explanted , ERENDIRA negative, micra PPM placed  Rectal bleed and proctitis in August.  Low grade clostridium bacteremia with ESBL E coli in waters  Completed 10 days of ertapenem  AVF created in left arm, not used yet.  CKD which recently converted to need for HD, ? BEULAH related to cefazolin.  [place] guarded. Patient is a 78y male with a past history of A Fib, ESRD, CAD, Mitral valve replacement who presented to [facility] on 9/30 in evening with fever and hypotension.  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  He has a tunnelled catheter, he is encephalopathic, unclear how long he has been on dialysis. He is awaiting a vascular consult regarding line.  High grade MRSA bacteremia. Limited history at this point. He has a tunnelled HDC and a prosthetic mitral valve.  Suggest:  1 Favor catheter removal  2 Blood cultures q 48 hours until clear  3 Vanco per levels  4 Favor d/c of meropenem  5. Will need to consider ERENDIRA at some point in w/u as well as brain imaging if mental status does not return to normal or if he develops any focal findings.  6 Will try to review old chart, different medical record number    Addendum: Chart from [facility] in August reviewed.  MSSA bacteremia March 2023, PPM explanted , ERENDIRA negative, micra PPM placed  Rectal bleed and proctitis in August.  Low grade clostridium bacteremia with ESBL E coli in waters  Completed 10 days of ertapenem  AVF created in left arm, not used yet.  CKD which recently converted to need for HD, ? BEULAH related to cefazolin.  [place] guarded. Patient is a 78y male with a past history of A Fib, ESRD, CAD, Mitral valve replacement who presented to [facility] on 9/30 in evening with fever and hypotension.  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  He has a tunnelled catheter, he is encephalopathic, unclear how long he has been on dialysis. He is awaiting a vascular consult regarding line.  High grade MRSA bacteremia. Limited history at this point. He has a tunnelled HDC and a prosthetic mitral valve.  Suggest:  1 Favor catheter removal  2 Blood cultures q 48 hours until clear  3 Vanco per levels  4 Favor d/c of meropenem  5. Will need to consider ERENDIRA at some point in w/u as well as brain imaging if mental status does not return to normal or if he develops any focal findings.  6 Will try to review old chart, different medical record number    Addendum: Chart from [facility] in August reviewed.  MSSA bacteremia March 2023, PPM explanted , ERENDIRA negative, micra PPM placed  Rectal bleed and proctitis in August.  Low grade clostridium bacteremia with ESBL E coli in waters  Completed 10 days of ertapenem  AVF created in left arm, not used yet.  CKD which recently converted to need for HD, ? BEULAH related to cefazolin.  [place] guarded.

## 2023-10-02 NOTE — CONSULT NOTE ADULT - SUBJECTIVE AND OBJECTIVE BOX
ID provider note:    HPI:   Patient is a 78y male with a past history of A Fib, ESRD, CAD, Mitral valve replacement who presented to [facility] on 9/30 in evening with fever and hypotension.  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  He has a tunnelled catheter, he is encephalopathic, unclear how long he has been on dialysis. He is awaiting a vascular consult regarding line.    REVIEW OF SYSTEMS:  All other review of systems negative (Comprehensive ROS): limited by encephalopathy    PAST MEDICAL & SURGICAL HISTORY:  Chronic atrial fibrillation    · Assessment	  Patient is a 78y male with a past history of A Fib, ESRD, CAD, Mitral valve replacement who presented to [facility] on 9/30 in evening with fever and hypotension.  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  He has a tunnelled catheter, he is encephalopathic, unclear how long he has been on dialysis. He is awaiting a vascular consult regarding line.  High grade MRSA bacteremia. Limited history at this point. He has a tunnelled HDC and a prosthetic mitral valve.  Suggest:  1 Favor catheter removal  2 Blood cultures q 48 hours until clear  3 Vanco per levels  4 Favor d/c of meropenem  5. Will need to consider ERENDIRA at some point in w/u as well as brain imaging if mental status does not return to normal or if he develops any focal findings.  6 Will try to review old chart, different medical record number    Addendum: Chart from [facility] in August reviewed.  MSSA bacteremia March 2023, PPM explanted , ERENDIRA negative, micra PPM placed  Rectal bleed and proctitis in August.  Low grade clostridium bacteremia with ESBL E coli in waters  Completed 10 days of ertapenem  AVF created in left arm, not used yet.  CKD which recently converted to need for HD, ? BEULAH related to cefazolin.  [place] guarded.   ID provider note:    HPI:   Patient is a 78y male with a past history of A Fib, ESRD, CAD, Mitral valve replacement who presented to [facility] on 9/30 in evening with fever and hypotension.  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  He has a tunnelled catheter, he is encephalopathic, unclear how long he has been on dialysis. He is awaiting a vascular consult regarding line.    REVIEW OF SYSTEMS:  All other review of systems negative (Comprehensive ROS): limited by encephalopathy    PAST MEDICAL & SURGICAL HISTORY:  Chronic atrial fibrillation    · Assessment	  Patient is a 78y male with a past history of A Fib, ESRD, CAD, Mitral valve replacement who presented to [facility] on 9/30 in evening with fever and hypotension.  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  He has a tunnelled catheter, he is encephalopathic, unclear how long he has been on dialysis. He is awaiting a vascular consult regarding line.  High grade MRSA bacteremia. Limited history at this point. He has a tunnelled HDC and a prosthetic mitral valve.  Suggest:  1 Favor catheter removal  2 Blood cultures q 48 hours until clear  3 Vanco per levels  4 Favor d/c of meropenem  5. Will need to consider ERENDIRA at some point in w/u as well as brain imaging if mental status does not return to normal or if he develops any focal findings.  6 Will try to review old chart, different medical record number    Addendum: Chart from [facility] in August reviewed.  MSSA bacteremia March 2023, PPM explanted , ERENDIRA negative, micra PPM placed  Rectal bleed and proctitis in August.  Low grade clostridium bacteremia with ESBL E coli in waters  Completed 10 days of ertapenem  AVF created in left arm, not used yet.  CKD which recently converted to need for HD, ? BEULAH related to cefazolin.  [place] guarded.   ID provider note:    HPI:   Patient is a 78y male with a past history of A Fib, ESRD, CAD, Mitral valve replacement who presented to [facility] on 9/30 in evening with fever and hypotension.  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  He has a tunnelled catheter, he is encephalopathic, unclear how long he has been on dialysis. He is awaiting a vascular consult regarding line.    REVIEW OF SYSTEMS:  All other review of systems negative (Comprehensive ROS): limited by encephalopathy    PAST MEDICAL & SURGICAL HISTORY:  Chronic atrial fibrillation    · Assessment	  Patient is a 78y male with a past history of A Fib, ESRD, CAD, Mitral valve replacement who presented to [facility] on 9/30 in evening with fever and hypotension.  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  He has a tunnelled catheter, he is encephalopathic, unclear how long he has been on dialysis. He is awaiting a vascular consult regarding line.  High grade MRSA bacteremia. Limited history at this point. He has a tunnelled HDC and a prosthetic mitral valve.  Suggest:  1 Favor catheter removal  2 Blood cultures q 48 hours until clear  3 Vanco per levels  4 Favor d/c of meropenem  5. Will need to consider ERENDIRA at some point in w/u as well as brain imaging if mental status does not return to normal or if he develops any focal findings.  6 Will try to review old chart, different medical record number    Addendum: Chart from [facility] in August reviewed.  MSSA bacteremia March 2023, PPM explanted , ERENDIRA negative, micra PPM placed  Rectal bleed and proctitis in August.  Low grade clostridium bacteremia with ESBL E coli in waters  Completed 10 days of ertapenem  AVF created in left arm, not used yet.  CKD which recently converted to need for HD, ? BEULAH related to cefazolin.  [place] guarded.

## 2023-10-02 NOTE — CONSULT NOTE ADULT - ASSESSMENT
79 yo M pmhx ESRD came to ED complaining of fatigue. Patient noted to be febrile to 102.2 in ED with complains of cough and wheezing. Received 1l NS bolus and noted to be hypotensive to 80's systolic. Complained of some abdominal pain but unsure why he is in the hospital. Poor historian and unable to fully participate in ROS.  Hematology consulted for thrombocytopenia. 77 yo M pmhx ESRD came to ED complaining of fatigue. Patient noted to be febrile to 102.2 in ED with complains of cough and wheezing. Received 1l NS bolus and noted to be hypotensive to 80's systolic. Complained of some abdominal pain but unsure why he is in the hospital. Poor historian and unable to fully participate in ROS.  Hematology consulted for thrombocytopenia.

## 2023-10-02 NOTE — PROGRESS NOTE ADULT - SUBJECTIVE AND OBJECTIVE BOX
Seen in ICU, on NC O2    Vital Signs Last 24 Hrs  T(C): 36.6 (10-02-23 @ 12:52), Max: 37.1 (10-01-23 @ 23:00)  T(F): 97.9 (10-02-23 @ 12:52), Max: 98.7 (10-01-23 @ 23:00)  HR: 103 (10-02-23 @ 19:00) (68 - 103)  BP: 103/56 (10-02-23 @ 19:00) (78/48 - 116/54)  BP(mean): 67 (10-02-23 @ 19:00) (58 - 90)  RR: 20 (10-02-23 @ 19:00) (10 - 20)  SpO2: 99% (10-02-23 @ 19:00) (95% - 100%)    I&O's Detail    01 Oct 2023 07:01  -  02 Oct 2023 07:00  --------------------------------------------------------  IN:    Albumin 5% - 50 mL: 150 mL    IV PiggyBack: 50 mL    IV PiggyBack: 250 mL    IV PiggyBack: 100 mL    IV PiggyBack: 100 mL    Norepinephrine: 283.3 mL  Total IN: 933.3 mL    OUT:  Total OUT: 0 mL    Total NET: 933.3 mL    02 Oct 2023 07:01  -  02 Oct 2023 21:11  --------------------------------------------------------  IN:    Albumin 5% - 50 mL: 50 mL    IV PiggyBack: 250 mL    IV PiggyBack: 50 mL    Norepinephrine: 2.7 mL    Norepinephrine: 2.7 mL  Total IN: 355.4 mL    OUT:  Total OUT: 0 mL    Total NET: 355.4 mL    s1s2  b/l air entry  soft, ND  sm edema, LUE AVF + bruit                         9.2    9.51  )-----------( 50       ( 02 Oct 2023 05:00 )             27.9     02 Oct 2023 05:00    134    |  101    |  63     ----------------------------<  88     4.5     |  23     |  4.45     Ca    8.4        02 Oct 2023 05:00  Phos  3.3       02 Oct 2023 05:00  Mg     2.3       02 Oct 2023 05:00    TPro  6.0    /  Alb  2.0    /  TBili  0.8    /  DBili  x      /  AST  67     /  ALT  63     /  AlkPhos  162    01 Oct 2023 05:30    LIVER FUNCTIONS - ( 01 Oct 2023 05:30 )  Alb: 2.0 g/dL / Pro: 6.0 g/dL / ALK PHOS: 162 U/L / ALT: 63 U/L / AST: 67 U/L / GGT: x           PT/INR - ( 01 Oct 2023 09:40 )   PT: 21.7 sec;   INR: 1.89 ratio      Culture - Blood (collected 30 Sep 2023 19:15)  Source: .Blood Blood-Peripheral  Gram Stain (01 Oct 2023 11:24):    Growth in anaerobic bottle: Gram Positive Cocci in Clusters    Growth in aerobic bottle: Gram Positive Cocci in Clusters  Preliminary Report (02 Oct 2023 11:24):    Growth in aerobic and anaerobic bottles: Staphylococcus aureus    See previous culture  [id number]    Culture - Blood (collected 30 Sep 2023 19:10)  Source: .Blood Blood-Peripheral  Gram Stain (01 Oct 2023 11:02):    Growth in anaerobic bottle: Gram Positive Cocci in Clusters    Growth in aerobic bottle: Gram Positive Cocci in Clusters  Preliminary Report (02 Oct 2023 13:02):    Growth in aerobic and anaerobic bottles: Staphylococcus aureus    Direct identification is available within approximately 3-5    hours either by Blood Panel Multiplexed PCR or Direct    MALDI-TOF. Details: [email or web address]  Organism: Blood Culture PCR (01 Oct 2023 11:32)  Organism: Blood Culture PCR (01 Oct 2023 11:32)    aluminum hydroxide/magnesium hydroxide/simethicone Suspension 10 milliLiter(s) Oral every 6 hours PRN  aspirin  chewable 81 milliGRAM(s) Oral daily  folic acid 1 milliGRAM(s) Oral daily  hydrocortisone hemorrhoidal Suppository 1 Suppository(s) Rectal two times a day  influenza  Vaccine (HIGH DOSE) 0.7 milliLiter(s) IntraMuscular once  levothyroxine 137 MICROGram(s) Oral daily  mesalamine Suppository 1000 milliGRAM(s) Rectal at bedtime  midodrine 10 milliGRAM(s) Oral every 8 hours  norepinephrine Infusion 0.05 MICROgram(s)/kG/Min IV Continuous <Continuous>  pantoprazole    Tablet 40 milliGRAM(s) Oral before breakfast  polyethylene glycol 3350 17 Gram(s) Oral daily  senna 2 Tablet(s) Oral at bedtime    A/P:    ESRD on HD MWF, Afib, CM, EF 45 - 50%  Adm w/MRSA bacteremia  Pls dose Vanco by levels  Perm cath d/c-d today  BP support as needed  Will f/u BMP, CBC  Will try to hold off on HD for another day or two if possible to give the pt line holiday  D/w ICU team    [phone] Seen in ICU, on [place] O2    Vital Signs Last 24 Hrs  T(C): 36.6 (10-02-23 @ 12:52), Max: 37.1 (10-01-23 @ 23:00)  T(F): 97.9 (10-02-23 @ 12:52), Max: 98.7 (10-01-23 @ 23:00)  HR: 103 (10-02-23 @ 19:00) (68 - 103)  BP: 103/56 (10-02-23 @ 19:00) (78/48 - 116/54)  BP(mean): 67 (10-02-23 @ 19:00) (58 - 90)  RR: 20 (10-02-23 @ 19:00) (10 - 20)  SpO2: 99% (10-02-23 @ 19:00) (95% - 100%)    I&O's Detail    01 Oct 2023 07:01  -  02 Oct 2023 07:00  --------------------------------------------------------  IN:    Albumin 5% - 50 mL: 150 mL    IV PiggyBack: 50 mL    IV PiggyBack: 250 mL    IV PiggyBack: 100 mL    IV PiggyBack: 100 mL    Norepinephrine: 283.3 mL  Total IN: 933.3 mL    OUT:  Total OUT: 0 mL    Total NET: 933.3 mL    02 Oct 2023 07:01  -  02 Oct 2023 21:11  --------------------------------------------------------  IN:    Albumin 5% - 50 mL: 50 mL    IV PiggyBack: 250 mL    IV PiggyBack: 50 mL    Norepinephrine: 2.7 mL    Norepinephrine: 2.7 mL  Total IN: 355.4 mL    OUT:  Total OUT: 0 mL    Total NET: 355.4 mL    s1s2  b/l air entry  soft, ND  sm edema, LUE AVF + bruit                         9.2    9.51  )-----------( 50       ( 02 Oct 2023 05:00 )             27.9     02 Oct 2023 05:00    134    |  101    |  63     ----------------------------<  88     4.5     |  23     |  4.45     Ca    8.4        02 Oct 2023 05:00  Phos  3.3       02 Oct 2023 05:00  Mg     2.3       02 Oct 2023 05:00    TPro  6.0    /  Alb  2.0    /  TBili  0.8    /  DBili  x      /  AST  67     /  ALT  63     /  AlkPhos  162    01 Oct 2023 05:30    LIVER FUNCTIONS - ( 01 Oct 2023 05:30 )  Alb: 2.0 g/dL / Pro: 6.0 g/dL / ALK PHOS: 162 U/L / ALT: 63 U/L / AST: 67 U/L / GGT: x           PT/INR - ( 01 Oct 2023 09:40 )   PT: 21.7 sec;   INR: 1.89 ratio      Culture - Blood (collected 30 Sep 2023 19:15)  Source: .Blood Blood-Peripheral  Gram Stain (01 Oct 2023 11:24):    Growth in anaerobic bottle: Gram Positive Cocci in Clusters    Growth in aerobic bottle: Gram Positive Cocci in Clusters  Preliminary Report (02 Oct 2023 11:24):    Growth in aerobic and anaerobic bottles: Staphylococcus aureus    See previous culture  [id number]    Culture - Blood (collected 30 Sep 2023 19:10)  Source: .Blood Blood-Peripheral  Gram Stain (01 Oct 2023 11:02):    Growth in anaerobic bottle: Gram Positive Cocci in Clusters    Growth in aerobic bottle: Gram Positive Cocci in Clusters  Preliminary Report (02 Oct 2023 13:02):    Growth in aerobic and anaerobic bottles: Staphylococcus aureus    Direct identification is available within approximately 3-5    hours either by Blood Panel Multiplexed PCR or Direct    MALDI-TOF. Details: [email or web address]  Organism: Blood Culture PCR (01 Oct 2023 11:32)  Organism: Blood Culture PCR (01 Oct 2023 11:32)    aluminum hydroxide/magnesium hydroxide/simethicone Suspension 10 milliLiter(s) Oral every 6 hours PRN  aspirin  chewable 81 milliGRAM(s) Oral daily  folic acid 1 milliGRAM(s) Oral daily  hydrocortisone hemorrhoidal Suppository 1 Suppository(s) Rectal two times a day  influenza  Vaccine (HIGH DOSE) 0.7 milliLiter(s) IntraMuscular once  levothyroxine 137 MICROGram(s) Oral daily  mesalamine Suppository 1000 milliGRAM(s) Rectal at bedtime  midodrine 10 milliGRAM(s) Oral every 8 hours  norepinephrine Infusion 0.05 MICROgram(s)/kG/Min IV Continuous <Continuous>  pantoprazole    Tablet 40 milliGRAM(s) Oral before breakfast  polyethylene glycol 3350 17 Gram(s) Oral daily  senna 2 Tablet(s) Oral at bedtime    A/P:    ESRD on HD MWF, Afib, CM, EF 45 - 50%  Adm w/MRSA bacteremia  Pls dose Vanco by levels  Perm cath d/c-d today  BP support as needed  Will f/u BMP, CBC  Will try to hold off on HD for another day or two if possible to give the pt line holiday  D/w ICU team    [phone] Seen in ICU, on [place] O2    Vital Signs Last 24 Hrs  T(C): 36.6 (10-02-23 @ 12:52), Max: 37.1 (10-01-23 @ 23:00)  T(F): 97.9 (10-02-23 @ 12:52), Max: 98.7 (10-01-23 @ 23:00)  HR: 103 (10-02-23 @ 19:00) (68 - 103)  BP: 103/56 (10-02-23 @ 19:00) (78/48 - 116/54)  BP(mean): 67 (10-02-23 @ 19:00) (58 - 90)  RR: 20 (10-02-23 @ 19:00) (10 - 20)  SpO2: 99% (10-02-23 @ 19:00) (95% - 100%)    I&O's Detail    01 Oct 2023 07:01  -  02 Oct 2023 07:00  --------------------------------------------------------  IN:    Albumin 5% - 50 mL: 150 mL    IV PiggyBack: 50 mL    IV PiggyBack: 250 mL    IV PiggyBack: 100 mL    IV PiggyBack: 100 mL    Norepinephrine: 283.3 mL  Total IN: 933.3 mL    OUT:  Total OUT: 0 mL    Total NET: 933.3 mL    02 Oct 2023 07:01  -  02 Oct 2023 21:11  --------------------------------------------------------  IN:    Albumin 5% - 50 mL: 50 mL    IV PiggyBack: 250 mL    IV PiggyBack: 50 mL    Norepinephrine: 2.7 mL    Norepinephrine: 2.7 mL  Total IN: 355.4 mL    OUT:  Total OUT: 0 mL    Total NET: 355.4 mL    s1s2  b/l air entry  soft, ND  sm edema, LUE AVF + bruit                         9.2    9.51  )-----------( 50       ( 02 Oct 2023 05:00 )             27.9     02 Oct 2023 05:00    134    |  101    |  63     ----------------------------<  88     4.5     |  23     |  4.45     Ca    8.4        02 Oct 2023 05:00  Phos  3.3       02 Oct 2023 05:00  Mg     2.3       02 Oct 2023 05:00    TPro  6.0    /  Alb  2.0    /  TBili  0.8    /  DBili  x      /  AST  67     /  ALT  63     /  AlkPhos  162    01 Oct 2023 05:30    LIVER FUNCTIONS - ( 01 Oct 2023 05:30 )  Alb: 2.0 g/dL / Pro: 6.0 g/dL / ALK PHOS: 162 U/L / ALT: 63 U/L / AST: 67 U/L / GGT: x           PT/INR - ( 01 Oct 2023 09:40 )   PT: 21.7 sec;   INR: 1.89 ratio      Culture - Blood (collected 30 Sep 2023 19:15)  Source: .Blood Blood-Peripheral  Gram Stain (01 Oct 2023 11:24):    Growth in anaerobic bottle: Gram Positive Cocci in Clusters    Growth in aerobic bottle: Gram Positive Cocci in Clusters  Preliminary Report (02 Oct 2023 11:24):    Growth in aerobic and anaerobic bottles: Staphylococcus aureus    See previous culture  [id number]    Culture - Blood (collected 30 Sep 2023 19:10)  Source: .Blood Blood-Peripheral  Gram Stain (01 Oct 2023 11:02):    Growth in anaerobic bottle: Gram Positive Cocci in Clusters    Growth in aerobic bottle: Gram Positive Cocci in Clusters  Preliminary Report (02 Oct 2023 13:02):    Growth in aerobic and anaerobic bottles: Staphylococcus aureus    Direct identification is available within approximately 3-5    hours either by Blood Panel Multiplexed PCR or Direct    MALDI-TOF. Details: [email or web address]  Organism: Blood Culture PCR (01 Oct 2023 11:32)  Organism: Blood Culture PCR (01 Oct 2023 11:32)    aluminum hydroxide/magnesium hydroxide/simethicone Suspension 10 milliLiter(s) Oral every 6 hours PRN  aspirin  chewable 81 milliGRAM(s) Oral daily  folic acid 1 milliGRAM(s) Oral daily  hydrocortisone hemorrhoidal Suppository 1 Suppository(s) Rectal two times a day  influenza  Vaccine (HIGH DOSE) 0.7 milliLiter(s) IntraMuscular once  levothyroxine 137 MICROGram(s) Oral daily  mesalamine Suppository 1000 milliGRAM(s) Rectal at bedtime  midodrine 10 milliGRAM(s) Oral every 8 hours  norepinephrine Infusion 0.05 MICROgram(s)/kG/Min IV Continuous <Continuous>  pantoprazole    Tablet 40 milliGRAM(s) Oral before breakfast  polyethylene glycol 3350 17 Gram(s) Oral daily  senna 2 Tablet(s) Oral at bedtime    A/P:    ESRD on HD MWF, Afib, CM, EF 45 - 50%  Adm w/MRSA bacteremia  Pls dose Vanco by levels  Perm cath d/c-d today  BP support as needed  Will f/u BMP, CBC  Will try to hold off on HD for another day or two if possible to give the pt line holiday  D/w ICU team    [phone]

## 2023-10-02 NOTE — PROGRESS NOTE ADULT - SUBJECTIVE AND OBJECTIVE BOX
Patient is a 78y old  Male who presents with a chief complaint of Sepsis (02 Oct 2023 21:10)      BRIEF HOSPITAL COURSE:   77 yo m pmhx Afib on Eliquis, CAD, MVR, ESRD on HD via tunneled catheter admitted with septic shock 2/2 PNA and MRSA bacteremia s/p tunneled HD cath removal by vascular.    Events last 24 hours:   Patient with frequent PVCs this evening, repeat bmp with stable lytes.  Weaning levophed as tolerated.        PAST MEDICAL & SURGICAL HISTORY:  Chronic atrial fibrillation      History of BPH      CAD (coronary artery disease)      Coronary stent patent      ESRD on dialysis      History of GI bleed      Mitral valve replaced        Allergies    levofloxacin (Unknown)  alfuzosin (Unknown)  tamsulosin (Unknown)  Myrbetriq (Unknown)    Intolerances      FAMILY HISTORY:      Social History:   from facility       Review of Systems:  buttocks pain      Physical Examination:    General: No acute distress.      HEENT: nc    PULM: cta b/l    CVS: afib    ABD: Soft, nondistended, nontender, +bs    EXT: No edema, nontender    SKIN: Warm     NEURO: Alert, interactive,      Medications:  vancomycin  IVPB 1000 milliGRAM(s) IV Intermittent every 24 hours    midodrine 10 milliGRAM(s) Oral every 8 hours  norepinephrine Infusion 0.05 MICROgram(s)/kG/Min IV Continuous <Continuous>    acetaminophen     Tablet .. 650 milliGRAM(s) Oral every 6 hours PRN  aspirin  chewable 81 milliGRAM(s) Oral daily    aluminum hydroxide/magnesium hydroxide/simethicone Suspension 10 milliLiter(s) Oral every 6 hours PRN  mesalamine Suppository 1000 milliGRAM(s) Rectal at bedtime  pantoprazole    Tablet 40 milliGRAM(s) Oral before breakfast  polyethylene glycol 3350 17 Gram(s) Oral daily  senna 2 Tablet(s) Oral at bedtime    levothyroxine 137 MICROGram(s) Oral daily    folic acid 1 milliGRAM(s) Oral daily    influenza  Vaccine (HIGH DOSE) 0.7 milliLiter(s) IntraMuscular once    hydrocortisone hemorrhoidal Suppository 1 Suppository(s) Rectal two times a day    ICU Vital Signs Last 24 Hrs  T(C): 36.7 (02 Oct 2023 20:00), Max: 36.7 (02 Oct 2023 20:00)  T(F): 98.1 (02 Oct 2023 20:00), Max: 98.1 (02 Oct 2023 20:00)  HR: 87 (03 Oct 2023 03:00) (71 - 109)  BP: 95/58 (03 Oct 2023 03:00) (78/48 - 116/54)  BP(mean): 67 (03 Oct 2023 03:00) (58 - 90)  ABP: --  ABP(mean): --  RR: 22 (03 Oct 2023 03:00) (10 - 22)  SpO2: 99% (03 Oct 2023 03:00) (95% - 100%)    O2 Parameters below as of 03 Oct 2023 03:00  Patient On (Oxygen Delivery Method): nasal cannula  O2 Flow (L/min): 2        Vital Signs Last 24 Hrs  T(C): 36.7 (02 Oct 2023 20:00), Max: 36.7 (02 Oct 2023 20:00)  T(F): 98.1 (02 Oct 2023 20:00), Max: 98.1 (02 Oct 2023 20:00)  HR: 87 (03 Oct 2023 03:00) (71 - 109)  BP: 95/58 (03 Oct 2023 03:00) (78/48 - 116/54)  BP(mean): 67 (03 Oct 2023 03:00) (58 - 90)  RR: 22 (03 Oct 2023 03:00) (10 - 22)  SpO2: 99% (03 Oct 2023 03:00) (95% - 100%)    Parameters below as of 03 Oct 2023 03:00  Patient On (Oxygen Delivery Method): nasal cannula  O2 Flow (L/min): 2      I&O's Detail    01 Oct 2023 07:01  -  02 Oct 2023 07:00  --------------------------------------------------------  IN:    Albumin 5% - 50 mL: 150 mL    IV PiggyBack: 50 mL    IV PiggyBack: 250 mL    IV PiggyBack: 100 mL    IV PiggyBack: 100 mL    Norepinephrine: 283.3 mL  Total IN: 933.3 mL    OUT:  Total OUT: 0 mL    Total NET: 933.3 mL      02 Oct 2023 07:01  -  03 Oct 2023 04:14  --------------------------------------------------------  IN:    Albumin 5% - 50 mL: 50 mL    IV PiggyBack: 250 mL    IV PiggyBack: 50 mL    Norepinephrine: 2.7 mL    Norepinephrine: 2.7 mL  Total IN: 355.4 mL    OUT:  Total OUT: 0 mL    Total NET: 355.4 mL      LABS:                        9.2    9.51  )-----------( 50       ( 02 Oct 2023 05:00 )             27.9     10-02    130<L>  |  98  |  75<H>  ----------------------------<  97  4.3   |  22  |  4.92<H>    Ca    9.0      02 Oct 2023 22:05  Phos  2.8     10-02  Mg     2.3     10-02    TPro  6.0  /  Alb  2.0<L>  /  TBili  0.8  /  DBili  x   /  AST  67<H>  /  ALT  63<H>  /  AlkPhos  162<H>  10-01      PT/INR - ( 01 Oct 2023 09:40 )   PT: 21.7 sec;   INR: 1.89 ratio    PTT - ( 01 Oct 2023 09:40 )  PTT:34.6 sec      Urinalysis Basic - ( 02 Oct 2023 22:05 )  Color: x / Appearance: x / SG: x / pH: x  Gluc: 97 mg/dL / Ketone: x  / Bili: x / Urobili: x   Blood: x / Protein: x / Nitrite: x   Leuk Esterase: x / RBC: x / WBC x   Sq Epi: x / Non Sq Epi: x / Bacteria: x      CULTURES:  Culture Results:   Growth in aerobic and anaerobic bottles: Staphylococcus aureus  See previous culture  [id number] (09-30 @ 19:15)  Culture Results:   Growth in aerobic and anaerobic bottles: Staphylococcus aureus  Direct identification is available within approximately 3-5  hours either by Blood Panel Multiplexed PCR or Direct  MALDI-TOF. Details: [email or web address] (09-30 @ 19:10)      RADIOLOGY:   < from: Xray Chest 1 View- PORTABLE-Routine (Xray Chest 1 View- PORTABLE-Routine .) (10.01.23 @ 08:39) >  ACC: [id number] EXAM:  XR CHEST PORTABLE ROUTINE 1V   ORDERED BY: ELMA MONREAL     ACC: [id number] EXAM:  XR CHEST PORTABLE IMMED 1V   ORDERED BY: BRANDIN BURTON     PROCEDURE DATE:  09/30/2023      INTERPRETATION:  Chest portable at 7:30 PM on 9/30/2023    CLINICAL HISTORY: Sepsis    Comparison:  None available    Cardiomegaly with prior open heart surgery including left atrial   appendage clip and mitral valve replacement. A wireless micropacemaker   versus loop recorder type device is identified. A double lumen dialysis   type catheter is in position.    Lungs reveal coarsened lung markings with areas of linear   atelectasis/patchy infiltrate at the left lower lung and a right-sided   pleural effusion/right mid and lower lung airspace disease. The left   angle is minimally blunted. Bones without acute abnormality.    Chest portable at 8:40 AM on 10/1/2023    CLINICAL HISTORY: Fever/sepsis/follow-up    COMPARISON: Film one day previously    FINDINGS: No change in the heartor mediastinum as above. Right-sided   pleural effusion/airspace disease is minimally improved. No change in the   patchy and linear left lower lobe infiltrates and blunted left angle.    IMPRESSION: Cardiomegaly. Right lower lobe infiltrate/pneumonia and   associated effusion. Patchy left lower lobe areas of   infiltrate/atelectasis. Additional findings as above    --- End of Report ---    LAZARUS FERREIRA MD; Attending Radiologist  This document has been electronically signed. Oct  1 2023  9:38AM    < end of copied text >   Patient is a 78y old  Male who presents with a chief complaint of Sepsis (02 Oct 2023 21:10)      BRIEF HOSPITAL COURSE:   77 yo m pmhx Afib on Eliquis, CAD, MVR, ESRD on HD via tunneled catheter admitted with septic shock 2/2 PNA and MRSA bacteremia s/p tunneled HD cath removal by vascular.    Events last 24 hours:   Patient with frequent PVCs this evening, repeat bmp with stable lytes.  Weaning levophed as tolerated.        PAST MEDICAL & SURGICAL HISTORY:  Chronic atrial fibrillation      History of BPH      CAD (coronary artery disease)      Coronary stent patent      ESRD on dialysis      History of GI bleed      Mitral valve replaced        Allergies    levofloxacin (Unknown)  alfuzosin (Unknown)  tamsulosin (Unknown)  Myrbetriq (Unknown)    Intolerances      FAMILY HISTORY:      Social History:   from facility       Review of Systems:  buttocks pain      Physical Examination:    General: No acute distress.      HEENT: nc    PULM: cta b/l    CVS: afib    ABD: Soft, nondistended, nontender, +bs    EXT: No edema, nontender    SKIN: Warm     NEURO: Alert, interactive,      Medications:  vancomycin  IVPB 1000 milliGRAM(s) IV Intermittent every 24 hours    midodrine 10 milliGRAM(s) Oral every 8 hours  norepinephrine Infusion 0.05 MICROgram(s)/kG/Min IV Continuous <Continuous>    acetaminophen     Tablet .. 650 milliGRAM(s) Oral every 6 hours PRN  aspirin  chewable 81 milliGRAM(s) Oral daily    aluminum hydroxide/magnesium hydroxide/simethicone Suspension 10 milliLiter(s) Oral every 6 hours PRN  mesalamine Suppository 1000 milliGRAM(s) Rectal at bedtime  pantoprazole    Tablet 40 milliGRAM(s) Oral before breakfast  polyethylene glycol 3350 17 Gram(s) Oral daily  senna 2 Tablet(s) Oral at bedtime    levothyroxine 137 MICROGram(s) Oral daily    folic acid 1 milliGRAM(s) Oral daily    influenza  Vaccine (HIGH DOSE) 0.7 milliLiter(s) IntraMuscular once    hydrocortisone hemorrhoidal Suppository 1 Suppository(s) Rectal two times a day    ICU Vital Signs Last 24 Hrs  T(C): 36.7 (02 Oct 2023 20:00), Max: 36.7 (02 Oct 2023 20:00)  T(F): 98.1 (02 Oct 2023 20:00), Max: 98.1 (02 Oct 2023 20:00)  HR: 87 (03 Oct 2023 03:00) (71 - 109)  BP: 95/58 (03 Oct 2023 03:00) (78/48 - 116/54)  BP(mean): 67 (03 Oct 2023 03:00) (58 - 90)  ABP: --  ABP(mean): --  RR: 22 (03 Oct 2023 03:00) (10 - 22)  SpO2: 99% (03 Oct 2023 03:00) (95% - 100%)    O2 Parameters below as of 03 Oct 2023 03:00  Patient On (Oxygen Delivery Method): nasal cannula  O2 Flow (L/min): 2        Vital Signs Last 24 Hrs  T(C): 36.7 (02 Oct 2023 20:00), Max: 36.7 (02 Oct 2023 20:00)  T(F): 98.1 (02 Oct 2023 20:00), Max: 98.1 (02 Oct 2023 20:00)  HR: 87 (03 Oct 2023 03:00) (71 - 109)  BP: 95/58 (03 Oct 2023 03:00) (78/48 - 116/54)  BP(mean): 67 (03 Oct 2023 03:00) (58 - 90)  RR: 22 (03 Oct 2023 03:00) (10 - 22)  SpO2: 99% (03 Oct 2023 03:00) (95% - 100%)    Parameters below as of 03 Oct 2023 03:00  Patient On (Oxygen Delivery Method): nasal cannula  O2 Flow (L/min): 2      I&O's Detail    01 Oct 2023 07:01  -  02 Oct 2023 07:00  --------------------------------------------------------  IN:    Albumin 5% - 50 mL: 150 mL    IV PiggyBack: 50 mL    IV PiggyBack: 250 mL    IV PiggyBack: 100 mL    IV PiggyBack: 100 mL    Norepinephrine: 283.3 mL  Total IN: 933.3 mL    OUT:  Total OUT: 0 mL    Total NET: 933.3 mL      02 Oct 2023 07:01  -  03 Oct 2023 04:14  --------------------------------------------------------  IN:    Albumin 5% - 50 mL: 50 mL    IV PiggyBack: 250 mL    IV PiggyBack: 50 mL    Norepinephrine: 2.7 mL    Norepinephrine: 2.7 mL  Total IN: 355.4 mL    OUT:  Total OUT: 0 mL    Total NET: 355.4 mL      LABS:                        9.2    9.51  )-----------( 50       ( 02 Oct 2023 05:00 )             27.9     10-02    130<L>  |  98  |  75<H>  ----------------------------<  97  4.3   |  22  |  4.92<H>    Ca    9.0      02 Oct 2023 22:05  Phos  2.8     10-02  Mg     2.3     10-02    TPro  6.0  /  Alb  2.0<L>  /  TBili  0.8  /  DBili  x   /  AST  67<H>  /  ALT  63<H>  /  AlkPhos  162<H>  10-01      PT/INR - ( 01 Oct 2023 09:40 )   PT: 21.7 sec;   INR: 1.89 ratio    PTT - ( 01 Oct 2023 09:40 )  PTT:34.6 sec      Urinalysis Basic - ( 02 Oct 2023 22:05 )  Color: x / Appearance: x / SG: x / pH: x  Gluc: 97 mg/dL / Ketone: x  / Bili: x / Urobili: x   Blood: x / Protein: x / Nitrite: x   Leuk Esterase: x / RBC: x / WBC x   Sq Epi: x / Non Sq Epi: x / Bacteria: x      CULTURES:  Culture Results:   Growth in aerobic and anaerobic bottles: Staphylococcus aureus  See previous culture  [id number] (09-30 @ 19:15)  Culture Results:   Growth in aerobic and anaerobic bottles: Staphylococcus aureus  Direct identification is available within approximately 3-5  hours either by Blood Panel Multiplexed PCR or Direct  MALDI-TOF. Details: [email or web address] (09-30 @ 19:10)      RADIOLOGY:   < from: Xray Chest 1 View- PORTABLE-Routine (Xray Chest 1 View- PORTABLE-Routine .) (10.01.23 @ 08:39) >  ACC: [id number] EXAM:  XR CHEST PORTABLE ROUTINE 1V   ORDERED BY: ELMA MONREAL     ACC: [id number] EXAM:  XR CHEST PORTABLE IMMED 1V   ORDERED BY: BRANDIN BURTON     PROCEDURE DATE:  09/30/2023      INTERPRETATION:  Chest portable at 7:30 PM on 9/30/2023    CLINICAL HISTORY: Sepsis    Comparison:  None available    Cardiomegaly with prior open heart surgery including left atrial   appendage clip and mitral valve replacement. A wireless micropacemaker   versus loop recorder type device is identified. A double lumen dialysis   type catheter is in position.    Lungs reveal coarsened lung markings with areas of linear   atelectasis/patchy infiltrate at the left lower lung and a right-sided   pleural effusion/right mid and lower lung airspace disease. The left   angle is minimally blunted. Bones without acute abnormality.    Chest portable at 8:40 AM on 10/1/2023    CLINICAL HISTORY: Fever/sepsis/follow-up    COMPARISON: Film one day previously    FINDINGS: No change in the heartor mediastinum as above. Right-sided   pleural effusion/airspace disease is minimally improved. No change in the   patchy and linear left lower lobe infiltrates and blunted left angle.    IMPRESSION: Cardiomegaly. Right lower lobe infiltrate/pneumonia and   associated effusion. Patchy left lower lobe areas of   infiltrate/atelectasis. Additional findings as above    --- End of Report ---    LAZARUS FERREIRA MD; Attending Radiologist  This document has been electronically signed. Oct  1 2023  9:38AM    < end of copied text >   Patient is a 78y old  Male who presents with a chief complaint of Sepsis (02 Oct 2023 21:10)      BRIEF HOSPITAL COURSE:   77 yo m pmhx Afib on Eliquis, CAD, MVR, ESRD on HD via tunneled catheter admitted with septic shock 2/2 PNA and MRSA bacteremia s/p tunneled HD cath removal by vascular.    Events last 24 hours:   Patient with frequent PVCs this evening, repeat bmp with stable lytes.  Weaning levophed as tolerated.        PAST MEDICAL & SURGICAL HISTORY:  Chronic atrial fibrillation      History of BPH      CAD (coronary artery disease)      Coronary stent patent      ESRD on dialysis      History of GI bleed      Mitral valve replaced        Allergies    levofloxacin (Unknown)  alfuzosin (Unknown)  tamsulosin (Unknown)  Myrbetriq (Unknown)    Intolerances      FAMILY HISTORY:      Social History:   from facility       Review of Systems:  buttocks pain      Physical Examination:    General: No acute distress.      HEENT: nc    PULM: cta b/l    CVS: afib    ABD: Soft, nondistended, nontender, +bs    EXT: No edema, nontender    SKIN: Warm     NEURO: Alert, interactive,      Medications:  vancomycin  IVPB 1000 milliGRAM(s) IV Intermittent every 24 hours    midodrine 10 milliGRAM(s) Oral every 8 hours  norepinephrine Infusion 0.05 MICROgram(s)/kG/Min IV Continuous <Continuous>    acetaminophen     Tablet .. 650 milliGRAM(s) Oral every 6 hours PRN  aspirin  chewable 81 milliGRAM(s) Oral daily    aluminum hydroxide/magnesium hydroxide/simethicone Suspension 10 milliLiter(s) Oral every 6 hours PRN  mesalamine Suppository 1000 milliGRAM(s) Rectal at bedtime  pantoprazole    Tablet 40 milliGRAM(s) Oral before breakfast  polyethylene glycol 3350 17 Gram(s) Oral daily  senna 2 Tablet(s) Oral at bedtime    levothyroxine 137 MICROGram(s) Oral daily    folic acid 1 milliGRAM(s) Oral daily    influenza  Vaccine (HIGH DOSE) 0.7 milliLiter(s) IntraMuscular once    hydrocortisone hemorrhoidal Suppository 1 Suppository(s) Rectal two times a day    ICU Vital Signs Last 24 Hrs  T(C): 36.7 (02 Oct 2023 20:00), Max: 36.7 (02 Oct 2023 20:00)  T(F): 98.1 (02 Oct 2023 20:00), Max: 98.1 (02 Oct 2023 20:00)  HR: 87 (03 Oct 2023 03:00) (71 - 109)  BP: 95/58 (03 Oct 2023 03:00) (78/48 - 116/54)  BP(mean): 67 (03 Oct 2023 03:00) (58 - 90)  ABP: --  ABP(mean): --  RR: 22 (03 Oct 2023 03:00) (10 - 22)  SpO2: 99% (03 Oct 2023 03:00) (95% - 100%)    O2 Parameters below as of 03 Oct 2023 03:00  Patient On (Oxygen Delivery Method): nasal cannula  O2 Flow (L/min): 2        Vital Signs Last 24 Hrs  T(C): 36.7 (02 Oct 2023 20:00), Max: 36.7 (02 Oct 2023 20:00)  T(F): 98.1 (02 Oct 2023 20:00), Max: 98.1 (02 Oct 2023 20:00)  HR: 87 (03 Oct 2023 03:00) (71 - 109)  BP: 95/58 (03 Oct 2023 03:00) (78/48 - 116/54)  BP(mean): 67 (03 Oct 2023 03:00) (58 - 90)  RR: 22 (03 Oct 2023 03:00) (10 - 22)  SpO2: 99% (03 Oct 2023 03:00) (95% - 100%)    Parameters below as of 03 Oct 2023 03:00  Patient On (Oxygen Delivery Method): nasal cannula  O2 Flow (L/min): 2      I&O's Detail    01 Oct 2023 07:01  -  02 Oct 2023 07:00  --------------------------------------------------------  IN:    Albumin 5% - 50 mL: 150 mL    IV PiggyBack: 50 mL    IV PiggyBack: 250 mL    IV PiggyBack: 100 mL    IV PiggyBack: 100 mL    Norepinephrine: 283.3 mL  Total IN: 933.3 mL    OUT:  Total OUT: 0 mL    Total NET: 933.3 mL      02 Oct 2023 07:01  -  03 Oct 2023 04:14  --------------------------------------------------------  IN:    Albumin 5% - 50 mL: 50 mL    IV PiggyBack: 250 mL    IV PiggyBack: 50 mL    Norepinephrine: 2.7 mL    Norepinephrine: 2.7 mL  Total IN: 355.4 mL    OUT:  Total OUT: 0 mL    Total NET: 355.4 mL      LABS:                        9.2    9.51  )-----------( 50       ( 02 Oct 2023 05:00 )             27.9     10-02    130<L>  |  98  |  75<H>  ----------------------------<  97  4.3   |  22  |  4.92<H>    Ca    9.0      02 Oct 2023 22:05  Phos  2.8     10-02  Mg     2.3     10-02    TPro  6.0  /  Alb  2.0<L>  /  TBili  0.8  /  DBili  x   /  AST  67<H>  /  ALT  63<H>  /  AlkPhos  162<H>  10-01      PT/INR - ( 01 Oct 2023 09:40 )   PT: 21.7 sec;   INR: 1.89 ratio    PTT - ( 01 Oct 2023 09:40 )  PTT:34.6 sec      Urinalysis Basic - ( 02 Oct 2023 22:05 )  Color: x / Appearance: x / SG: x / pH: x  Gluc: 97 mg/dL / Ketone: x  / Bili: x / Urobili: x   Blood: x / Protein: x / Nitrite: x   Leuk Esterase: x / RBC: x / WBC x   Sq Epi: x / Non Sq Epi: x / Bacteria: x      CULTURES:  Culture Results:   Growth in aerobic and anaerobic bottles: Staphylococcus aureus  See previous culture  [id number] (09-30 @ 19:15)  Culture Results:   Growth in aerobic and anaerobic bottles: Staphylococcus aureus  Direct identification is available within approximately 3-5  hours either by Blood Panel Multiplexed PCR or Direct  MALDI-TOF. Details: [email or web address] (09-30 @ 19:10)      RADIOLOGY:   < from: Xray Chest 1 View- PORTABLE-Routine (Xray Chest 1 View- PORTABLE-Routine .) (10.01.23 @ 08:39) >  ACC: [id number] EXAM:  XR CHEST PORTABLE ROUTINE 1V   ORDERED BY: ELMA MONREAL     ACC: [id number] EXAM:  XR CHEST PORTABLE IMMED 1V   ORDERED BY: BRANDIN BURTON     PROCEDURE DATE:  09/30/2023      INTERPRETATION:  Chest portable at 7:30 PM on 9/30/2023    CLINICAL HISTORY: Sepsis    Comparison:  None available    Cardiomegaly with prior open heart surgery including left atrial   appendage clip and mitral valve replacement. A wireless micropacemaker   versus loop recorder type device is identified. A double lumen dialysis   type catheter is in position.    Lungs reveal coarsened lung markings with areas of linear   atelectasis/patchy infiltrate at the left lower lung and a right-sided   pleural effusion/right mid and lower lung airspace disease. The left   angle is minimally blunted. Bones without acute abnormality.    Chest portable at 8:40 AM on 10/1/2023    CLINICAL HISTORY: Fever/sepsis/follow-up    COMPARISON: Film one day previously    FINDINGS: No change in the heartor mediastinum as above. Right-sided   pleural effusion/airspace disease is minimally improved. No change in the   patchy and linear left lower lobe infiltrates and blunted left angle.    IMPRESSION: Cardiomegaly. Right lower lobe infiltrate/pneumonia and   associated effusion. Patchy left lower lobe areas of   infiltrate/atelectasis. Additional findings as above    --- End of Report ---    LAZARUS FERREIRA MD; Attending Radiologist  This document has been electronically signed. Oct  1 2023  9:38AM    < end of copied text >

## 2023-10-02 NOTE — CONSULT NOTE ADULT - ASSESSMENT
as above      I added on to the OR schedule today for permacath removal.    Will try to get ahold of next of kin.      thank you    Dr. Liam Obregon  cell#[phone] as above      I added on to the OR schedule today for permacath removal.    Will try to get ahold of next of kin.      thank you    Dr. Liam Obregon  cell#[phone] as above      I added on to the OR schedule today for permacath removal.    Will try to get ahold of next of kin.      thank you    Dr. Liam Obregon  cell#[phone]

## 2023-10-02 NOTE — CONSULT NOTE ADULT - CONVERSATION DETAILS
Met and spoke w/ pt at bedside today , he was initially lethargic, became more awake, oriented 2/3, able to recall why he is here, not oriented to year or month or location, just " hospital" .    Pt aware saying he thinks he has an infection in his catheter, and is aware they want to put in a new one for his HD.  I asked about his support, pt denies having children but reports his girlfriend Surekha is his support and can make his decisions if he cannot. He says he has it written down some where and then defers to Surekha . Saying "Surekha knows everything, call her" .     I called and left  for Surekha  .

## 2023-10-02 NOTE — PROGRESS NOTE ADULT - ASSESSMENT
77 yo m pmhx Afib on Eliquis, CAD, MVR, ESRD on HD via tunneled catheter admitted with septic shock 2/2 PNA and MRSA bacteremia s/p tunneled HD cath removal by vascular.    NEURO: Pain control as needed  CV: Distributive shock requiring vasopressor therapy, actively titrating levophed for MAP >65, weaning as tolerated.  Midodrine as adjunctive therapy.   RESP: 2L NC for spo2 >92%  RENAL: ESRD on HD, last session 9/29. Renally dose medications. Nephrology following  GI: Renal restricted diet   ENDO: Synthroid   ID: Vancomycin for coverage   HEME: AC held in setting of tunneled catheter placement   DISPO: Full code     Critical Care time: 40 mins assessing presenting problems of acute illness that poses high probability of life threatening deterioration or end organ damage/dysfunction.  Medical decision making including Initiating plan of care, reviewing data, reviewing radiology, discussing with multidisciplinary team, non inclusive of procedures, discussing goals of care with patient/family  79 yo m pmhx Afib on Eliquis, CAD, MVR, ESRD on HD via tunneled catheter admitted with septic shock 2/2 PNA and MRSA bacteremia s/p tunneled HD cath removal by vascular.    NEURO: Pain control as needed  CV: Distributive shock requiring vasopressor therapy, actively titrating levophed for MAP >65, weaning as tolerated.  Midodrine as adjunctive therapy.   RESP: 2L NC for spo2 >92%  RENAL: ESRD on HD, last session 9/29. Renally dose medications. Nephrology following  GI: Renal restricted diet   ENDO: Synthroid   ID: Vancomycin for coverage   HEME: AC held in setting of tunneled catheter placement   DISPO: Full code     Critical Care time: 40 mins assessing presenting problems of acute illness that poses high probability of life threatening deterioration or end organ damage/dysfunction.  Medical decision making including Initiating plan of care, reviewing data, reviewing radiology, discussing with multidisciplinary team, non inclusive of procedures, discussing goals of care with patient/family

## 2023-10-02 NOTE — BRIEF OPERATIVE NOTE - NSICDXBRIEFPOSTOP_GEN_ALL_CORE_FT
POST-OP DIAGNOSIS:  Bacteremia associated with intravascular line 02-Oct-2023 16:41:29  Liam Obregon

## 2023-10-02 NOTE — BRIEF OPERATIVE NOTE - SPECIMENS
1: tip of permacath(submitted to microbiology), 2: remaining catheter(submitted to pathology "gross only)

## 2023-10-02 NOTE — CONSULT NOTE ADULT - SUBJECTIVE AND OBJECTIVE BOX
HPI:  77 yo M pmhx ESRD came to ED complaining of fatigue. Patient noted to be febrile to 102.2 in ED with complains of cough and wheezing. Received 1l NS bolus and noted to be hypotensive to 80's systolic. At bedside patient is oriented to self and place. Complains of some abdominal pain but is unsure why he is in the hospital. Poor historian and unable to fully participate in ROS (30 Sep 2023 22:43)      PAST MEDICAL & SURGICAL HISTORY:  Chronic atrial fibrillation      History of BPH      CAD (coronary artery disease)      Coronary stent patent      ESRD on dialysis      History of GI bleed      Mitral valve replaced          SOCIAL HISTORY:    Admitted from:   [facility] /  Substance abuse history:              Tobacco hx:                  Alcohol hx:              Home Opioid hx:  Advent:                                    Preferred Language:    Surrogate/HCP/:  Surekha Issa- girlfriend           Phone#:  [phone]    FAMILY HISTORY:    Baseline ADLs (prior to admission):    Allergies    levofloxacin (Unknown)  alfuzosin (Unknown)  tamsulosin (Unknown)  Myrbetriq (Unknown)    Intolerances      Present Symptoms:   Dyspnea: no  Nausea/Vomiting: no  Anxiety:  Depressed   Fatigue: yes  Loss of appetite: yes  Pain:             yes                   location:        buttocks  Review of Systems: [All others negative/unable   MEDICATIONS  (STANDING):  albumin human 25% IVPB 50 milliLiter(s) IV Intermittent every 6 hours  aspirin  chewable 81 milliGRAM(s) Oral daily  folic acid 1 milliGRAM(s) Oral daily  hydrocortisone hemorrhoidal Suppository 1 Suppository(s) Rectal two times a day  influenza  Vaccine (HIGH DOSE) 0.7 milliLiter(s) IntraMuscular once  levothyroxine 137 MICROGram(s) Oral daily  mesalamine Suppository 1000 milliGRAM(s) Rectal at bedtime  midodrine 10 milliGRAM(s) Oral every 8 hours  norepinephrine Infusion 0.05 MICROgram(s)/kG/Min (6.81 mL/Hr) IV Continuous <Continuous>  pantoprazole    Tablet 40 milliGRAM(s) Oral before breakfast  polyethylene glycol 3350 17 Gram(s) Oral daily  senna 2 Tablet(s) Oral at bedtime    MEDICATIONS  (PRN):  aluminum hydroxide/magnesium hydroxide/simethicone Suspension 10 milliLiter(s) Oral every 6 hours PRN dyspepsia      PHYSICAL EXAM:    Vital Signs Last 24 Hrs  T(C): 36.3 (02 Oct 2023 09:00), Max: 37.1 (01 Oct 2023 23:00)  T(F): 97.4 (02 Oct 2023 09:00), Max: 98.7 (01 Oct 2023 23:00)  HR: 84 (02 Oct 2023 11:00) (68 - 102)  BP: 78/48 (02 Oct 2023 11:00) (78/48 - 123/75)  BP(mean): 58 (02 Oct 2023 11:00) (58 - 90)  RR: 12 (02 Oct 2023 11:00) (10 - 24)  SpO2: 100% (02 Oct 2023 11:00) (92% - 100%)    Parameters below as of 02 Oct 2023 09:00  Patient On (Oxygen Delivery Method): nasal cannula  O2 Flow (L/min): 2      General: alert  oriented x 2 , verbal, drowsy , calm, not in distress    Karnofsky Performance Score/Palliative Performance Status Version2:  30   %  PPSV: 30%  HEENT: normal dry mouth     Lungs: ess cl dim to bases, non-labored breathing    CV: normal  rate   GI: abd sl distended  non tender    : normal  incontinent /delgadillo /cc   Musculoskeletal: normal , zhang,  w/ weakness  and edema x 4  ext    Skin:  pale, w/d sacrum stage: 1 edema to 4 ext   Neuro: wakes to stim., initially drowsy , becomes more awake, oriented to self,  girlfriend's name, unable to state year or month , confused at previous living situation   Oral intake ability: minimal moderate c/o poor appetite   Diet: as nupur     LABS:                        9.2    9.51  )-----------( 50       ( 02 Oct 2023 05:00 )             27.9     10-02    134<L>  |  101  |  63<H>  ----------------------------<  88  4.5   |  23  |  4.45<H>    Ca    8.4      02 Oct 2023 05:00  Phos  3.3     10-02  Mg     2.3     10-02    TPro  6.0  /  Alb  2.0<L>  /  TBili  0.8  /  DBili  x   /  AST  67<H>  /  ALT  63<H>  /  AlkPhos  162<H>  10-01    Urinalysis Basic - ( 02 Oct 2023 05:00 )    Color: x / Appearance: x / SG: x / pH: x  Gluc: 88 mg/dL / Ketone: x  / Bili: x / Urobili: x   Blood: x / Protein: x / Nitrite: x   Leuk Esterase: x / RBC: x / WBC x   Sq Epi: x / Non Sq Epi: x / Bacteria: x        RADIOLOGY & ADDITIONAL STUDIES: < from: Xray Chest 1 View- PORTABLE-Routine (Xray Chest 1 View- PORTABLE-Routine .) (10.01.23 @ 08:39) >  Chest portable at 8:40 AM on 10/1/2023    CLINICAL HISTORY: Fever/sepsis/follow-up    COMPARISON: Film one day previously    FINDINGS: No change in the heartor mediastinum as above. Right-sided   pleural effusion/airspace disease is minimally improved. No change in the   patchy and linear left lower lobe infiltrates and blunted left angle.    IMPRESSION: Cardiomegaly. Right lower lobe infiltrate/pneumonia and   associated effusion. Patchy left lower lobe areas of   infiltrate/atelectasis. Additional findings as above          ADVANCE DIRECTIVES:  Molst - full code , HCP   Advanced Care Planning discussion total time spent:   HPI:  77 yo M pmhx ESRD came to ED complaining of fatigue. Patient noted to be febrile to 102.2 in ED with complains of cough and wheezing. Received 1l NS bolus and noted to be hypotensive to 80's systolic. At bedside patient is oriented to self and place. Complains of some abdominal pain but is unsure why he is in the hospital. Poor historian and unable to fully participate in ROS (30 Sep 2023 22:43)      PAST MEDICAL & SURGICAL HISTORY:  Chronic atrial fibrillation      History of BPH      CAD (coronary artery disease)      Coronary stent patent      ESRD on dialysis      History of GI bleed      Mitral valve replaced          SOCIAL HISTORY:    Admitted from:   [facility] /  Substance abuse history:              Tobacco hx:                  Alcohol hx:              Home Opioid hx:  Gnosticism:                                    Preferred Language:    Surrogate/HCP/:  Surekha Issa- girlfriend           Phone#:  [phone]    FAMILY HISTORY:    Baseline ADLs (prior to admission):    Allergies    levofloxacin (Unknown)  alfuzosin (Unknown)  tamsulosin (Unknown)  Myrbetriq (Unknown)    Intolerances      Present Symptoms:   Dyspnea: no  Nausea/Vomiting: no  Anxiety:  Depressed   Fatigue: yes  Loss of appetite: yes  Pain:             yes                   location:        buttocks  Review of Systems: [All others negative/unable   MEDICATIONS  (STANDING):  albumin human 25% IVPB 50 milliLiter(s) IV Intermittent every 6 hours  aspirin  chewable 81 milliGRAM(s) Oral daily  folic acid 1 milliGRAM(s) Oral daily  hydrocortisone hemorrhoidal Suppository 1 Suppository(s) Rectal two times a day  influenza  Vaccine (HIGH DOSE) 0.7 milliLiter(s) IntraMuscular once  levothyroxine 137 MICROGram(s) Oral daily  mesalamine Suppository 1000 milliGRAM(s) Rectal at bedtime  midodrine 10 milliGRAM(s) Oral every 8 hours  norepinephrine Infusion 0.05 MICROgram(s)/kG/Min (6.81 mL/Hr) IV Continuous <Continuous>  pantoprazole    Tablet 40 milliGRAM(s) Oral before breakfast  polyethylene glycol 3350 17 Gram(s) Oral daily  senna 2 Tablet(s) Oral at bedtime    MEDICATIONS  (PRN):  aluminum hydroxide/magnesium hydroxide/simethicone Suspension 10 milliLiter(s) Oral every 6 hours PRN dyspepsia      PHYSICAL EXAM:    Vital Signs Last 24 Hrs  T(C): 36.3 (02 Oct 2023 09:00), Max: 37.1 (01 Oct 2023 23:00)  T(F): 97.4 (02 Oct 2023 09:00), Max: 98.7 (01 Oct 2023 23:00)  HR: 84 (02 Oct 2023 11:00) (68 - 102)  BP: 78/48 (02 Oct 2023 11:00) (78/48 - 123/75)  BP(mean): 58 (02 Oct 2023 11:00) (58 - 90)  RR: 12 (02 Oct 2023 11:00) (10 - 24)  SpO2: 100% (02 Oct 2023 11:00) (92% - 100%)    Parameters below as of 02 Oct 2023 09:00  Patient On (Oxygen Delivery Method): nasal cannula  O2 Flow (L/min): 2      General: alert  oriented x 2 , verbal, drowsy , calm, not in distress    Karnofsky Performance Score/Palliative Performance Status Version2:  30   %  PPSV: 30%  HEENT: normal dry mouth     Lungs: ess cl dim to bases, non-labored breathing    CV: normal  rate   GI: abd sl distended  non tender    : normal  incontinent /delgadillo /cc   Musculoskeletal: normal , zhang,  w/ weakness  and edema x 4  ext    Skin:  pale, w/d sacrum stage: 1 edema to 4 ext   Neuro: wakes to stim., initially drowsy , becomes more awake, oriented to self,  girlfriend's name, unable to state year or month , confused at previous living situation   Oral intake ability: minimal moderate c/o poor appetite   Diet: as nupur     LABS:                        9.2    9.51  )-----------( 50       ( 02 Oct 2023 05:00 )             27.9     10-02    134<L>  |  101  |  63<H>  ----------------------------<  88  4.5   |  23  |  4.45<H>    Ca    8.4      02 Oct 2023 05:00  Phos  3.3     10-02  Mg     2.3     10-02    TPro  6.0  /  Alb  2.0<L>  /  TBili  0.8  /  DBili  x   /  AST  67<H>  /  ALT  63<H>  /  AlkPhos  162<H>  10-01    Urinalysis Basic - ( 02 Oct 2023 05:00 )    Color: x / Appearance: x / SG: x / pH: x  Gluc: 88 mg/dL / Ketone: x  / Bili: x / Urobili: x   Blood: x / Protein: x / Nitrite: x   Leuk Esterase: x / RBC: x / WBC x   Sq Epi: x / Non Sq Epi: x / Bacteria: x        RADIOLOGY & ADDITIONAL STUDIES: < from: Xray Chest 1 View- PORTABLE-Routine (Xray Chest 1 View- PORTABLE-Routine .) (10.01.23 @ 08:39) >  Chest portable at 8:40 AM on 10/1/2023    CLINICAL HISTORY: Fever/sepsis/follow-up    COMPARISON: Film one day previously    FINDINGS: No change in the heartor mediastinum as above. Right-sided   pleural effusion/airspace disease is minimally improved. No change in the   patchy and linear left lower lobe infiltrates and blunted left angle.    IMPRESSION: Cardiomegaly. Right lower lobe infiltrate/pneumonia and   associated effusion. Patchy left lower lobe areas of   infiltrate/atelectasis. Additional findings as above          ADVANCE DIRECTIVES:  Molst - full code , HCP   Advanced Care Planning discussion total time spent:   HPI:  77 yo M pmhx ESRD came to ED complaining of fatigue. Patient noted to be febrile to 102.2 in ED with complains of cough and wheezing. Received 1l NS bolus and noted to be hypotensive to 80's systolic. At bedside patient is oriented to self and place. Complains of some abdominal pain but is unsure why he is in the hospital. Poor historian and unable to fully participate in ROS (30 Sep 2023 22:43)      PAST MEDICAL & SURGICAL HISTORY:  Chronic atrial fibrillation      History of BPH      CAD (coronary artery disease)      Coronary stent patent      ESRD on dialysis      History of GI bleed      Mitral valve replaced          SOCIAL HISTORY:    Admitted from:   [facility] /  Substance abuse history:              Tobacco hx:                  Alcohol hx:              Home Opioid hx:  Jain:                                    Preferred Language:    Surrogate/HCP/:  Surekha Issa- girlfriend           Phone#:  [phone]    FAMILY HISTORY:    Baseline ADLs (prior to admission):    Allergies    levofloxacin (Unknown)  alfuzosin (Unknown)  tamsulosin (Unknown)  Myrbetriq (Unknown)    Intolerances      Present Symptoms:   Dyspnea: no  Nausea/Vomiting: no  Anxiety:  Depressed   Fatigue: yes  Loss of appetite: yes  Pain:             yes                   location:        buttocks  Review of Systems: [All others negative/unable   MEDICATIONS  (STANDING):  albumin human 25% IVPB 50 milliLiter(s) IV Intermittent every 6 hours  aspirin  chewable 81 milliGRAM(s) Oral daily  folic acid 1 milliGRAM(s) Oral daily  hydrocortisone hemorrhoidal Suppository 1 Suppository(s) Rectal two times a day  influenza  Vaccine (HIGH DOSE) 0.7 milliLiter(s) IntraMuscular once  levothyroxine 137 MICROGram(s) Oral daily  mesalamine Suppository 1000 milliGRAM(s) Rectal at bedtime  midodrine 10 milliGRAM(s) Oral every 8 hours  norepinephrine Infusion 0.05 MICROgram(s)/kG/Min (6.81 mL/Hr) IV Continuous <Continuous>  pantoprazole    Tablet 40 milliGRAM(s) Oral before breakfast  polyethylene glycol 3350 17 Gram(s) Oral daily  senna 2 Tablet(s) Oral at bedtime    MEDICATIONS  (PRN):  aluminum hydroxide/magnesium hydroxide/simethicone Suspension 10 milliLiter(s) Oral every 6 hours PRN dyspepsia      PHYSICAL EXAM:    Vital Signs Last 24 Hrs  T(C): 36.3 (02 Oct 2023 09:00), Max: 37.1 (01 Oct 2023 23:00)  T(F): 97.4 (02 Oct 2023 09:00), Max: 98.7 (01 Oct 2023 23:00)  HR: 84 (02 Oct 2023 11:00) (68 - 102)  BP: 78/48 (02 Oct 2023 11:00) (78/48 - 123/75)  BP(mean): 58 (02 Oct 2023 11:00) (58 - 90)  RR: 12 (02 Oct 2023 11:00) (10 - 24)  SpO2: 100% (02 Oct 2023 11:00) (92% - 100%)    Parameters below as of 02 Oct 2023 09:00  Patient On (Oxygen Delivery Method): nasal cannula  O2 Flow (L/min): 2      General: alert  oriented x 2 , verbal, drowsy , calm, not in distress    Karnofsky Performance Score/Palliative Performance Status Version2:  30   %  PPSV: 30%  HEENT: normal dry mouth     Lungs: ess cl dim to bases, non-labored breathing    CV: normal  rate   GI: abd sl distended  non tender    : normal  incontinent /delgadillo /cc   Musculoskeletal: normal , zhang,  w/ weakness  and edema x 4  ext    Skin:  pale, w/d sacrum stage: 1 edema to 4 ext   Neuro: wakes to stim., initially drowsy , becomes more awake, oriented to self,  girlfriend's name, unable to state year or month , confused at previous living situation   Oral intake ability: minimal moderate c/o poor appetite   Diet: as nupur     LABS:                        9.2    9.51  )-----------( 50       ( 02 Oct 2023 05:00 )             27.9     10-02    134<L>  |  101  |  63<H>  ----------------------------<  88  4.5   |  23  |  4.45<H>    Ca    8.4      02 Oct 2023 05:00  Phos  3.3     10-02  Mg     2.3     10-02    TPro  6.0  /  Alb  2.0<L>  /  TBili  0.8  /  DBili  x   /  AST  67<H>  /  ALT  63<H>  /  AlkPhos  162<H>  10-01    Urinalysis Basic - ( 02 Oct 2023 05:00 )    Color: x / Appearance: x / SG: x / pH: x  Gluc: 88 mg/dL / Ketone: x  / Bili: x / Urobili: x   Blood: x / Protein: x / Nitrite: x   Leuk Esterase: x / RBC: x / WBC x   Sq Epi: x / Non Sq Epi: x / Bacteria: x        RADIOLOGY & ADDITIONAL STUDIES: < from: Xray Chest 1 View- PORTABLE-Routine (Xray Chest 1 View- PORTABLE-Routine .) (10.01.23 @ 08:39) >  Chest portable at 8:40 AM on 10/1/2023    CLINICAL HISTORY: Fever/sepsis/follow-up    COMPARISON: Film one day previously    FINDINGS: No change in the heartor mediastinum as above. Right-sided   pleural effusion/airspace disease is minimally improved. No change in the   patchy and linear left lower lobe infiltrates and blunted left angle.    IMPRESSION: Cardiomegaly. Right lower lobe infiltrate/pneumonia and   associated effusion. Patchy left lower lobe areas of   infiltrate/atelectasis. Additional findings as above          ADVANCE DIRECTIVES:  Molst - full code , HCP   Advanced Care Planning discussion total time spent:

## 2023-10-02 NOTE — CHART NOTE - NSCHARTNOTEFT_GEN_A_CORE
I spoke to Dr. Davidson. He and Dr. Cheema feels that this is urgent case as patient has +bacteremia with +MRSA blood cultures.  In the setting of a heart valve it is particularly expedient to get the infected permacath out.    I tried all day to call HCP as did Dr. Davidson to no avail.    Rose Cheema and Lety signed the 'two physician" consent and I cosigned it.    I appreised OR Manager Abby Levi and anesthesiologist Roni Rangel of the importance of doing this case.

## 2023-10-02 NOTE — CONSULT NOTE ADULT - PROBLEM SELECTOR RECOMMENDATION 9
Patient with thrombocytopenia clinically secondary to sepsis/BM suppression. With concurrent anemia, obtain baseline iron studies, B 12/folate, retic. Holding on transfusion for now. F/U CBC with diff. in am.

## 2023-10-02 NOTE — PROGRESS NOTE ADULT - ASSESSMENT
Physical Examination:  GENERAL:               Alert, oridented x 2, No acute distress.    HEENT:                   No JVD, Moist MM  PULM:                     Bilateral air entry, Clear to auscultation bilaterally, no significant sputum production, No Rales, No Rhonchi, No Wheezing  CVS:                         S1, S2,  + Murmur  ABD:                        Soft, nondistended, nontender, normoactive bowel sounds,   EXT:                         no edema, nontender, No Cyanosis or Clubbing   Vascular:                Warm Extremities, Normal Capillary refill, Normal Distal Pulses  SKIN:                       Warm and well perfused, no rashes noted.   NEURO:                 Alert, interactive nonfocal, follows commands  PSYC:                      Calm, Limited Insight.        Assessment  Septic shock, suspect line sepsis  AMS- metabolic encephelopathy  Thrombocytopenia, suspect from Sepsis  Underlying h/o CAD s/p PCI, BPH, ESRD on HD, s/p MVR, Afib      Plan  As mental status improving, suspect metabolic encephelopathy can d/c CT head  Appreciate heme eval check  B 12/folate, retic   On low dose pressors, taper to maintain map > 60  D/w ID will narow antibiotics to [facility]  and surgery called to remove perm cath  f/u cultures   hold a/c due to low BP  ID, Renal, Heme f/u  med rec done.     PMD:				                   Notified(Date):  Family Updated: 	Surekha [phone]	                                 Date: 10/2/2023 msg left for call back       Sedation & Analgesia:	n/a  Diet/Nutrition:		Diet, Renal Restrictions: For patients receiving Renal Replacement - No Protein Restr, No Conc K, No Conc Phos, Low Sodium (09-30-23 @ 23:51) [Active]  GI PPx:			Protonix  DVT Ppx:		Low plts, suspect will go lower, Venodynes    	RISK                                                          Points  	[ ] Previous VTE                                           	3  	[ ] Thrombophilia                                        	2  	[ ] Lower limb paralysis                              	2   	[ ] Current Cancer                                       	2   	[x ] Immobilization > 24 hrs                        	1  	[x ] ICU/CCU stay > 24 hours                       	1  	[x ] Age > 60                                                   	1  		Total:[3 ]      Activity:		    Head of Bed:               35-45 Deg  Glycemic Control:        n/a    Lines:  PIV  CENTRAL LINE: 	[x ] YES [ ] NO	        //Perm cath present on admission            LOCATION:   	                       DATE INSERTED:   	                    REMOVE:  [ ] YES [x ] NO    A-LINE:  	                [ ] YES [ ] NO                      LOCATION:   	                       DATE INSERTED: 		            REMOVE:  [ ] YES [ ] NO    SU: 		        [ ] YES [ ] NO  		                                       DATE INSERTED:		            REMOVE:  [ ] YES [ ] NO      Restraints may deemed necessary to prevent removal of life-sustaining devices [x  ] YES   [    ]  NO    Disposition:  ICU Care  Goals of Care: JULIANN from 8/30/23 Full code Physical Examination:  GENERAL:               Alert, oridented x 2, No acute distress.    HEENT:                   No JVD, Moist MM  PULM:                     Bilateral air entry, Clear to auscultation bilaterally, no significant sputum production, No Rales, No Rhonchi, No Wheezing  CVS:                         S1, S2,  + Murmur  ABD:                        Soft, nondistended, nontender, normoactive bowel sounds,   EXT:                         no edema, nontender, No Cyanosis or Clubbing   Vascular:                Warm Extremities, Normal Capillary refill, Normal Distal Pulses  SKIN:                       Warm and well perfused, no rashes noted.   NEURO:                 Alert, interactive nonfocal, follows commands  PSYC:                      Calm, Limited Insight.        Assessment  Septic shock, suspect line sepsis  AMS- metabolic encephelopathy  Thrombocytopenia, suspect from Sepsis  Underlying h/o CAD s/p PCI, BPH, ESRD on HD, s/p MVR, Afib      Plan  As mental status improving, suspect metabolic encephelopathy can d/c CT head  Appreciate heme eval check  B 12/folate, retic   On low dose pressors, taper to maintain map > 60  D/w ID will narow antibiotics to [facility]  and surgery called to remove perm cath  f/u cultures   hold a/c due to low BP  ID, Renal, Heme f/u  med rec done.     PMD:				                   Notified(Date):  Family Updated: 	Surekha [phone]	                                 Date: 10/2/2023 msg left for call back       Sedation & Analgesia:	n/a  Diet/Nutrition:		Diet, Renal Restrictions: For patients receiving Renal Replacement - No Protein Restr, No Conc K, No Conc Phos, Low Sodium (09-30-23 @ 23:51) [Active]  GI PPx:			Protonix  DVT Ppx:		Low plts, suspect will go lower, Venodynes    	RISK                                                          Points  	[ ] Previous VTE                                           	3  	[ ] Thrombophilia                                        	2  	[ ] Lower limb paralysis                              	2   	[ ] Current Cancer                                       	2   	[x ] Immobilization > 24 hrs                        	1  	[x ] ICU/CCU stay > 24 hours                       	1  	[x ] Age > 60                                                   	1  		Total:[3 ]      Activity:		    Head of Bed:               35-45 Deg  Glycemic Control:        n/a    Lines:  PIV  CENTRAL LINE: 	[x ] YES [ ] NO	        //Perm cath present on admission            LOCATION:   	                       DATE INSERTED:   	                    REMOVE:  [ ] YES [x ] NO    A-LINE:  	                [ ] YES [ ] NO                      LOCATION:   	                       DATE INSERTED: 		            REMOVE:  [ ] YES [ ] NO    SU: 		        [ ] YES [ ] NO  		                                       DATE INSERTED:		            REMOVE:  [ ] YES [ ] NO      Restraints may deemed necessary to prevent removal of life-sustaining devices [x  ] YES   [    ]  NO    Disposition:  ICU Care  Goals of Care: JULIANN from 8/30/23 Full code Physical Examination:  GENERAL:               Alert, oridented x 2, No acute distress.    HEENT:                   No JVD, Moist MM  PULM:                     Bilateral air entry, Clear to auscultation bilaterally, no significant sputum production, No Rales, No Rhonchi, No Wheezing  CVS:                         S1, S2,  + Murmur  ABD:                        Soft, nondistended, nontender, normoactive bowel sounds,   EXT:                         no edema, nontender, No Cyanosis or Clubbing   Vascular:                Warm Extremities, Normal Capillary refill, Normal Distal Pulses  SKIN:                       Warm and well perfused, no rashes noted.   NEURO:                 Alert, interactive nonfocal, follows commands  PSYC:                      Calm, Limited Insight.        Assessment  Septic shock, suspect line sepsis  AMS- metabolic encephelopathy  Thrombocytopenia, suspect from Sepsis  Underlying h/o CAD s/p PCI, BPH, ESRD on HD, s/p MVR, Afib      Plan  As mental status improving, suspect metabolic encephelopathy can d/c CT head  Appreciate heme eval check  B 12/folate, retic   On low dose pressors, taper to maintain map > 60  D/w ID will narow antibiotics to [facility]  and surgery called to remove perm cath  f/u cultures   hold a/c due to low BP  ID, Renal, Heme f/u  med rec done.     PMD:				                   Notified(Date):  Family Updated: 	Surekha [phone]	                                 Date: 10/2/2023 msg left for call back       Sedation & Analgesia:	n/a  Diet/Nutrition:		Diet, Renal Restrictions: For patients receiving Renal Replacement - No Protein Restr, No Conc K, No Conc Phos, Low Sodium (09-30-23 @ 23:51) [Active]  GI PPx:			Protonix  DVT Ppx:		Low plts, suspect will go lower, Venodynes    	RISK                                                          Points  	[ ] Previous VTE                                           	3  	[ ] Thrombophilia                                        	2  	[ ] Lower limb paralysis                              	2   	[ ] Current Cancer                                       	2   	[x ] Immobilization > 24 hrs                        	1  	[x ] ICU/CCU stay > 24 hours                       	1  	[x ] Age > 60                                                   	1  		Total:[3 ]      Activity:		    Head of Bed:               35-45 Deg  Glycemic Control:        n/a    Lines:  PIV  CENTRAL LINE: 	[x ] YES [ ] NO	        //Perm cath present on admission            LOCATION:   	                       DATE INSERTED:   	                    REMOVE:  [ ] YES [x ] NO    A-LINE:  	                [ ] YES [ ] NO                      LOCATION:   	                       DATE INSERTED: 		            REMOVE:  [ ] YES [ ] NO    SU: 		        [ ] YES [ ] NO  		                                       DATE INSERTED:		            REMOVE:  [ ] YES [ ] NO      Restraints may deemed necessary to prevent removal of life-sustaining devices [x  ] YES   [    ]  NO    Disposition:  ICU Care  Goals of Care: JULIANN from 8/30/23 Full code

## 2023-10-02 NOTE — CONSULT NOTE ADULT - SUBJECTIVE AND OBJECTIVE BOX
HPI:   Patient is a 78y male with a past history of A Fib, ESRD, CAD, Mitral valve replacement who presented to [facility] on 9/30 in evening with fever and hypotension.  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  He has a tunnelled catheter, he is encephalopathic, unclear how long he has been on dialysis. He is awaiting a vascular consult regarding line.    REVIEW OF SYSTEMS:  All other review of systems negative (Comprehensive ROS): limited by encephalopathy    PAST MEDICAL & SURGICAL HISTORY:  Chronic atrial fibrillation      History of BPH      CAD (coronary artery disease)      Coronary stent patent      ESRD on dialysis      History of GI bleed      Mitral valve replaced          Allergies    levofloxacin (Unknown)  alfuzosin (Unknown)  tamsulosin (Unknown)  Myrbetriq (Unknown)    Intolerances        Antimicrobials Day #  :day 2  meropenem  IVPB      meropenem  IVPB 500 milliGRAM(s) IV Intermittent every 24 hours  vancomycin  IVPB      vancomycin  IVPB 1000 milliGRAM(s) IV Intermittent every 24 hours    Other Medications:  albumin human 25% IVPB 50 milliLiter(s) IV Intermittent every 6 hours  aluminum hydroxide/magnesium hydroxide/simethicone Suspension 10 milliLiter(s) Oral every 6 hours PRN  aspirin  chewable 81 milliGRAM(s) Oral daily  folic acid 1 milliGRAM(s) Oral daily  hydrocortisone hemorrhoidal Suppository 1 Suppository(s) Rectal two times a day  influenza  Vaccine (HIGH DOSE) 0.7 milliLiter(s) IntraMuscular once  levothyroxine 137 MICROGram(s) Oral daily  mesalamine Suppository 1000 milliGRAM(s) Rectal at bedtime  midodrine 10 milliGRAM(s) Oral every 8 hours  midodrine 20 milliGRAM(s) Oral <User Schedule>  norepinephrine Infusion 0.05 MICROgram(s)/kG/Min IV Continuous <Continuous>  pantoprazole    Tablet 40 milliGRAM(s) Oral before breakfast  polyethylene glycol 3350 17 Gram(s) Oral daily  senna 2 Tablet(s) Oral at bedtime      FAMILY HISTORY: NC      SOCIAL HISTORY:  Smoking:  x   ETOH:   x  Drug Use: x      T(F): 98 (10-02-23 @ 03:00), Max: 98.7 (10-01-23 @ 23:00)  HR: 91 (10-02-23 @ 07:30)  BP: 105/56 (10-02-23 @ 07:30)  RR: 15 (10-02-23 @ 07:30)  SpO2: 97% (10-02-23 @ 07:30)  Wt(kg): --    PHYSICAL EXAM:  General: sleepy, no acute distress, acute and chronically ill appearing  Eyes:  anicteric, no conjunctival injection, no discharge  Oropharynx: no lesions or injection 	  Neck: supple, without adenopathy  Lungs: scattered ronchi  Heart: irregular rate and rhythm; no murmur,   Abdomen: soft, nondistended, nontender, without mass or organomegaly  Skin: no lesions  Extremities: no clubbing, cyanosis,+ edema  Neurologic: lethargic, marginally interactive, moves all extremities    LAB RESULTS:                        9.2    9.51  )-----------( 50       ( 02 Oct 2023 05:00 )             27.9     10-02    134<L>  |  101  |  63<H>  ----------------------------<  88  4.5   |  23  |  4.45<H>    Ca    8.4      02 Oct 2023 05:00  Phos  3.3     10-02  Mg     2.3     10-02    TPro  6.0  /  Alb  2.0<L>  /  TBili  0.8  /  DBili  x   /  AST  67<H>  /  ALT  63<H>  /  AlkPhos  162<H>  10-01    LIVER FUNCTIONS - ( 01 Oct 2023 05:30 )  Alb: 2.0 g/dL / Pro: 6.0 g/dL / ALK PHOS: 162 U/L / ALT: 63 U/L / AST: 67 U/L / GGT: x           Urinalysis Basic - ( 02 Oct 2023 05:00 )    Color: x / Appearance: x / SG: x / pH: x  Gluc: 88 mg/dL / Ketone: x  / Bili: x / Urobili: x   Blood: x / Protein: x / Nitrite: x   Leuk Esterase: x / RBC: x / WBC x   Sq Epi: x / Non Sq Epi: x / Bacteria: x        MICROBIOLOGY:  RECENT CULTURES:  09-30 @ 19:15 .Blood Blood-Peripheral     Growth in anaerobic bottle: Gram Positive Cocci in Clusters  Growth in aerobic bottle: Gram Positive Cocci in Clusters    Growth in anaerobic bottle: Gram Positive Cocci in Clusters  Growth in aerobic bottle: Gram Positive Cocci in Clusters    09-30 @ 19:10 .Blood Blood-Peripheral Blood Culture PCR    Growth in anaerobic bottle: Gram Positive Cocci in Clusters  Growth in aerobic bottle: Gram Positive Cocci in Clusters  Direct identification is available within approximately 3-5  hours either by Blood Panel Multiplexed PCR or Direct  MALDI-TOF. Details: [email or web address]    Growth in anaerobic bottle: Gram Positive Cocci in Clusters  Growth in aerobic bottle: Gram Positive Cocci in Clusters          RADIOLOGY REVIEWED:  < from: Xray Chest 1 View- PORTABLE-Routine (Xray Chest 1 View- PORTABLE-Routine .) (10.01.23 @ 08:39) >  IMPRESSION: Cardiomegaly. Right lower lobe infiltrate/pneumonia and   associated effusion. Patchy left lower lobe areas of   infiltrate/atelectasis. Additional findings as above    --- End of Report ---    < end of copied text >  < from: TTE Echo Complete w/o Contrast w/ Doppler (10.01.23 @ 10:26) >  Summary:   1. 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 Biatrial enlargement   4. Left ventricular ejection fraction, by visual estimation, is 45 to   50%.   5. Mild mitral annular calcification.   6. Mild-moderate tricuspid regurgitation.   7. Mild aortic valve stenosis.    < end of copied text >   HPI:   Patient is a 78y male with a past history of A Fib, ESRD, CAD, Mitral valve replacement who presented to [facility] on 9/30 in evening with fever and hypotension.  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  He has a tunnelled catheter, he is encephalopathic, unclear how long he has been on dialysis. He is awaiting a vascular consult regarding line.    REVIEW OF SYSTEMS:  All other review of systems negative (Comprehensive ROS): limited by encephalopathy    PAST MEDICAL & SURGICAL HISTORY:  Chronic atrial fibrillation      History of BPH      CAD (coronary artery disease)      Coronary stent patent      ESRD on dialysis      History of GI bleed      Mitral valve replaced          Allergies    levofloxacin (Unknown)  alfuzosin (Unknown)  tamsulosin (Unknown)  Myrbetriq (Unknown)    Intolerances        Antimicrobials Day #  :day 2  meropenem  IVPB      meropenem  IVPB 500 milliGRAM(s) IV Intermittent every 24 hours  vancomycin  IVPB      vancomycin  IVPB 1000 milliGRAM(s) IV Intermittent every 24 hours    Other Medications:  albumin human 25% IVPB 50 milliLiter(s) IV Intermittent every 6 hours  aluminum hydroxide/magnesium hydroxide/simethicone Suspension 10 milliLiter(s) Oral every 6 hours PRN  aspirin  chewable 81 milliGRAM(s) Oral daily  folic acid 1 milliGRAM(s) Oral daily  hydrocortisone hemorrhoidal Suppository 1 Suppository(s) Rectal two times a day  influenza  Vaccine (HIGH DOSE) 0.7 milliLiter(s) IntraMuscular once  levothyroxine 137 MICROGram(s) Oral daily  mesalamine Suppository 1000 milliGRAM(s) Rectal at bedtime  midodrine 10 milliGRAM(s) Oral every 8 hours  midodrine 20 milliGRAM(s) Oral <User Schedule>  norepinephrine Infusion 0.05 MICROgram(s)/kG/Min IV Continuous <Continuous>  pantoprazole    Tablet 40 milliGRAM(s) Oral before breakfast  polyethylene glycol 3350 17 Gram(s) Oral daily  senna 2 Tablet(s) Oral at bedtime      FAMILY HISTORY: NC      SOCIAL HISTORY:  Smoking:  x   ETOH:   x  Drug Use: x      T(F): 98 (10-02-23 @ 03:00), Max: 98.7 (10-01-23 @ 23:00)  HR: 91 (10-02-23 @ 07:30)  BP: 105/56 (10-02-23 @ 07:30)  RR: 15 (10-02-23 @ 07:30)  SpO2: 97% (10-02-23 @ 07:30)  Wt(kg): --    PHYSICAL EXAM:  General: sleepy, no acute distress, acute and chronically ill appearing  Eyes:  anicteric, no conjunctival injection, no discharge  Oropharynx: no lesions or injection 	  Neck: supple, without adenopathy  Lungs: scattered ronchi  Heart: irregular rate and rhythm; no murmur,   Abdomen: soft, nondistended, nontender, without mass or organomegaly  Skin: no lesions  Extremities: no clubbing, cyanosis,+ edema  Neurologic: lethargic, marginally interactive, moves all extremities    LAB RESULTS:                        9.2    9.51  )-----------( 50       ( 02 Oct 2023 05:00 )             27.9     10-02    134<L>  |  101  |  63<H>  ----------------------------<  88  4.5   |  23  |  4.45<H>    Ca    8.4      02 Oct 2023 05:00  Phos  3.3     10-02  Mg     2.3     10-02    TPro  6.0  /  Alb  2.0<L>  /  TBili  0.8  /  DBili  x   /  AST  67<H>  /  ALT  63<H>  /  AlkPhos  162<H>  10-01    LIVER FUNCTIONS - ( 01 Oct 2023 05:30 )  Alb: 2.0 g/dL / Pro: 6.0 g/dL / ALK PHOS: 162 U/L / ALT: 63 U/L / AST: 67 U/L / GGT: x           Urinalysis Basic - ( 02 Oct 2023 05:00 )    Color: x / Appearance: x / SG: x / pH: x  Gluc: 88 mg/dL / Ketone: x  / Bili: x / Urobili: x   Blood: x / Protein: x / Nitrite: x   Leuk Esterase: x / RBC: x / WBC x   Sq Epi: x / Non Sq Epi: x / Bacteria: x        MICROBIOLOGY:  RECENT CULTURES:  09-30 @ 19:15 .Blood Blood-Peripheral     Growth in anaerobic bottle: Gram Positive Cocci in Clusters  Growth in aerobic bottle: Gram Positive Cocci in Clusters    Growth in anaerobic bottle: Gram Positive Cocci in Clusters  Growth in aerobic bottle: Gram Positive Cocci in Clusters    09-30 @ 19:10 .Blood Blood-Peripheral Blood Culture PCR    Growth in anaerobic bottle: Gram Positive Cocci in Clusters  Growth in aerobic bottle: Gram Positive Cocci in Clusters  Direct identification is available within approximately 3-5  hours either by Blood Panel Multiplexed PCR or Direct  MALDI-TOF. Details: [email or web address]    Growth in anaerobic bottle: Gram Positive Cocci in Clusters  Growth in aerobic bottle: Gram Positive Cocci in Clusters          RADIOLOGY REVIEWED:  < from: Xray Chest 1 View- PORTABLE-Routine (Xray Chest 1 View- PORTABLE-Routine .) (10.01.23 @ 08:39) >  IMPRESSION: Cardiomegaly. Right lower lobe infiltrate/pneumonia and   associated effusion. Patchy left lower lobe areas of   infiltrate/atelectasis. Additional findings as above    --- End of Report ---    < end of copied text >  < from: TTE Echo Complete w/o Contrast w/ Doppler (10.01.23 @ 10:26) >  Summary:   1. 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 Biatrial enlargement   4. Left ventricular ejection fraction, by visual estimation, is 45 to   50%.   5. Mild mitral annular calcification.   6. Mild-moderate tricuspid regurgitation.   7. Mild aortic valve stenosis.    < end of copied text >   HPI:   Patient is a 78y male with a past history of A Fib, ESRD, CAD, Mitral valve replacement who presented to [facility] on 9/30 in evening with fever and hypotension.  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  He has a tunnelled catheter, he is encephalopathic, unclear how long he has been on dialysis. He is awaiting a vascular consult regarding line.    REVIEW OF SYSTEMS:  All other review of systems negative (Comprehensive ROS): limited by encephalopathy    PAST MEDICAL & SURGICAL HISTORY:  Chronic atrial fibrillation      History of BPH      CAD (coronary artery disease)      Coronary stent patent      ESRD on dialysis      History of GI bleed      Mitral valve replaced          Allergies    levofloxacin (Unknown)  alfuzosin (Unknown)  tamsulosin (Unknown)  Myrbetriq (Unknown)    Intolerances        Antimicrobials Day #  :day 2  meropenem  IVPB      meropenem  IVPB 500 milliGRAM(s) IV Intermittent every 24 hours  vancomycin  IVPB      vancomycin  IVPB 1000 milliGRAM(s) IV Intermittent every 24 hours    Other Medications:  albumin human 25% IVPB 50 milliLiter(s) IV Intermittent every 6 hours  aluminum hydroxide/magnesium hydroxide/simethicone Suspension 10 milliLiter(s) Oral every 6 hours PRN  aspirin  chewable 81 milliGRAM(s) Oral daily  folic acid 1 milliGRAM(s) Oral daily  hydrocortisone hemorrhoidal Suppository 1 Suppository(s) Rectal two times a day  influenza  Vaccine (HIGH DOSE) 0.7 milliLiter(s) IntraMuscular once  levothyroxine 137 MICROGram(s) Oral daily  mesalamine Suppository 1000 milliGRAM(s) Rectal at bedtime  midodrine 10 milliGRAM(s) Oral every 8 hours  midodrine 20 milliGRAM(s) Oral <User Schedule>  norepinephrine Infusion 0.05 MICROgram(s)/kG/Min IV Continuous <Continuous>  pantoprazole    Tablet 40 milliGRAM(s) Oral before breakfast  polyethylene glycol 3350 17 Gram(s) Oral daily  senna 2 Tablet(s) Oral at bedtime      FAMILY HISTORY: NC      SOCIAL HISTORY:  Smoking:  x   ETOH:   x  Drug Use: x      T(F): 98 (10-02-23 @ 03:00), Max: 98.7 (10-01-23 @ 23:00)  HR: 91 (10-02-23 @ 07:30)  BP: 105/56 (10-02-23 @ 07:30)  RR: 15 (10-02-23 @ 07:30)  SpO2: 97% (10-02-23 @ 07:30)  Wt(kg): --    PHYSICAL EXAM:  General: sleepy, no acute distress, acute and chronically ill appearing  Eyes:  anicteric, no conjunctival injection, no discharge  Oropharynx: no lesions or injection 	  Neck: supple, without adenopathy  Lungs: scattered ronchi  Heart: irregular rate and rhythm; no murmur,   Abdomen: soft, nondistended, nontender, without mass or organomegaly  Skin: no lesions  Extremities: no clubbing, cyanosis,+ edema  Neurologic: lethargic, marginally interactive, moves all extremities    LAB RESULTS:                        9.2    9.51  )-----------( 50       ( 02 Oct 2023 05:00 )             27.9     10-02    134<L>  |  101  |  63<H>  ----------------------------<  88  4.5   |  23  |  4.45<H>    Ca    8.4      02 Oct 2023 05:00  Phos  3.3     10-02  Mg     2.3     10-02    TPro  6.0  /  Alb  2.0<L>  /  TBili  0.8  /  DBili  x   /  AST  67<H>  /  ALT  63<H>  /  AlkPhos  162<H>  10-01    LIVER FUNCTIONS - ( 01 Oct 2023 05:30 )  Alb: 2.0 g/dL / Pro: 6.0 g/dL / ALK PHOS: 162 U/L / ALT: 63 U/L / AST: 67 U/L / GGT: x           Urinalysis Basic - ( 02 Oct 2023 05:00 )    Color: x / Appearance: x / SG: x / pH: x  Gluc: 88 mg/dL / Ketone: x  / Bili: x / Urobili: x   Blood: x / Protein: x / Nitrite: x   Leuk Esterase: x / RBC: x / WBC x   Sq Epi: x / Non Sq Epi: x / Bacteria: x        MICROBIOLOGY:  RECENT CULTURES:  09-30 @ 19:15 .Blood Blood-Peripheral     Growth in anaerobic bottle: Gram Positive Cocci in Clusters  Growth in aerobic bottle: Gram Positive Cocci in Clusters    Growth in anaerobic bottle: Gram Positive Cocci in Clusters  Growth in aerobic bottle: Gram Positive Cocci in Clusters    09-30 @ 19:10 .Blood Blood-Peripheral Blood Culture PCR    Growth in anaerobic bottle: Gram Positive Cocci in Clusters  Growth in aerobic bottle: Gram Positive Cocci in Clusters  Direct identification is available within approximately 3-5  hours either by Blood Panel Multiplexed PCR or Direct  MALDI-TOF. Details: [email or web address]    Growth in anaerobic bottle: Gram Positive Cocci in Clusters  Growth in aerobic bottle: Gram Positive Cocci in Clusters          RADIOLOGY REVIEWED:  < from: Xray Chest 1 View- PORTABLE-Routine (Xray Chest 1 View- PORTABLE-Routine .) (10.01.23 @ 08:39) >  IMPRESSION: Cardiomegaly. Right lower lobe infiltrate/pneumonia and   associated effusion. Patchy left lower lobe areas of   infiltrate/atelectasis. Additional findings as above    --- End of Report ---    < end of copied text >  < from: TTE Echo Complete w/o Contrast w/ Doppler (10.01.23 @ 10:26) >  Summary:   1. 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 Biatrial enlargement   4. Left ventricular ejection fraction, by visual estimation, is 45 to   50%.   5. Mild mitral annular calcification.   6. Mild-moderate tricuspid regurgitation.   7. Mild aortic valve stenosis.    < end of copied text >

## 2023-10-02 NOTE — CONSULT NOTE ADULT - SUBJECTIVE AND OBJECTIVE BOX
JANEE ARCEO  78y  Male  Admitting: BRANDONJeri Laineronan    HPI:  77 yo M pmhx ESRD came to ED complaining of fatigue. Patient noted to be febrile to 102.2 in ED with complains of cough and wheezing. Received 1l NS bolus and noted to be hypotensive to 80's systolic. Complained of some abdominal pain but unsure why he is in the hospital. Poor historian and unable to fully participate in ROS     PAST MEDICAL & SURGICAL HISTORY:  Chronic atrial fibrillation      History of BPH      CAD (coronary artery disease)      Coronary stent patent      ESRD on dialysis      History of GI bleed      Mitral valve replaced        HEALTH ISSUES - PROBLEM Dx:  Chronic atrial fibrillation    History of BPH    ESRD on dialysis    CAD (coronary artery disease)    Coronary stent patent    Mitral valve replaced      MEDICATIONS  (STANDING):  albumin human 25% IVPB 50 milliLiter(s) IV Intermittent every 6 hours  aspirin  chewable 81 milliGRAM(s) Oral daily  folic acid 1 milliGRAM(s) Oral daily  hydrocortisone hemorrhoidal Suppository 1 Suppository(s) Rectal two times a day  influenza  Vaccine (HIGH DOSE) 0.7 milliLiter(s) IntraMuscular once  levothyroxine 137 MICROGram(s) Oral daily  meropenem  IVPB      meropenem  IVPB 500 milliGRAM(s) IV Intermittent every 24 hours  mesalamine Suppository 1000 milliGRAM(s) Rectal at bedtime  midodrine 20 milliGRAM(s) Oral <User Schedule>  midodrine 10 milliGRAM(s) Oral every 8 hours  norepinephrine Infusion 0.05 MICROgram(s)/kG/Min (6.81 mL/Hr) IV Continuous <Continuous>  pantoprazole    Tablet 40 milliGRAM(s) Oral before breakfast  polyethylene glycol 3350 17 Gram(s) Oral daily  senna 2 Tablet(s) Oral at bedtime  vancomycin  IVPB      vancomycin  IVPB 1000 milliGRAM(s) IV Intermittent every 24 hours    MEDICATIONS  (PRN):  aluminum hydroxide/magnesium hydroxide/simethicone Suspension 10 milliLiter(s) Oral every 6 hours PRN dyspepsia    Allergies    levofloxacin (Unknown)  alfuzosin (Unknown)  tamsulosin (Unknown)  Myrbetriq (Unknown)    FAMILY HISTORY: NC in first degree relatives      SOCIAL HISTORY: No EtOH, no tobacco    REVIEW OF SYSTEMS:    CONSTITUTIONAL: No chills  EYES/ENT: No throat pain   NECK: No pain or stiffness  RESPIRATORY: No hemoptysis  CARDIOVASCULAR: No chest pain or palpitations  GASTROINTESTINAL: No hematemesis; No melena or hematochezia.  GENITOURINARY: No hematuria  NEUROLOGICAL: +weakness  SKIN: No itching   All other review of systems is negative unless indicated above.      T(F): 98 (10-02-23 @ 03:00), Max: 98.7 (10-01-23 @ 23:00)  HR: 91 (10-02-23 @ 07:30)  BP: 105/56 (10-02-23 @ 07:30)  RR: 15 (10-02-23 @ 07:30)  SpO2: 97% (10-02-23 @ 07:30)      GENERAL: NAD, well-developed  HEAD:  Atraumatic, Normocephalic  EYES: EOMI, PERRLA, conjunctiva and sclera clear  NECK: Supple, No JVD  CHEST/LUNG: +few scattered rhonchi ant.  HEART: Regular rate and rhythm; No murmurs, rubs, or gallops  ABDOMEN: Soft, Nontender, Nondistended; No palpable masses  EXTREMITIES:  no calf tenderness elicited  NEUROLOGY: awake, alert  SKIN: No petechiae      Labs:             9.2    9.51  )-----------( 50       ( 10-02 @ 05:00 )             27.9                10.4   10.77 )-----------( 56       ( 10-01 @ 09:40 )             31.9                9.4    8.07  )-----------( 60       ( 10-01 @ 05:30 )             29.3                9.4    11.13 )-----------( 93       ( 09-30 @ 19:15 )             28.6       10-02    134<L>  |  101  |  63<H>  ----------------------------<  88  4.5   |  23  |  4.45<H>    Ca    8.4      02 Oct 2023 05:00  Phos  3.3     10-02  Mg     2.3     10-02    TPro  6.0  /  Alb  2.0<L>  /  TBili  0.8  /  DBili  x   /  AST  67<H>  /  ALT  63<H>  /  AlkPhos  162<H>  10-01    Magnesium: 2.3 mg/dL [1.6 - 2.6] (10-02 @ 05:00)  Phosphorus: 3.3 mg/dL [2.5 - 4.5] (10-02 @ 05:00)  Magnesium: 2.2 mg/dL [1.6 - 2.6] (10-01 @ 19:45)  Phosphorus: 3.3 mg/dL [2.5 - 4.5] (10-01 @ 19:45)  Lactate Dehydrogenase, Serum: 232 U/L [50 - 242] (10-01 @ 09:40)  Magnesium: 2.3 mg/dL [1.6 - 2.6] (10-01 @ 09:40)    PT/INR - ( 01 Oct 2023 09:40 )   PT: 21.7 sec;   INR: 1.89 ratio         PTT - ( 01 Oct 2023 09:40 )  PTT:34.6 sec      Culture - Blood (collected 30 Sep 2023 19:15)  Source: .Blood Blood-Peripheral  Gram Stain (01 Oct 2023 11:24):    Growth in anaerobic bottle: Gram Positive Cocci in Clusters    Growth in aerobic bottle: Gram Positive Cocci in Clusters  Preliminary Report (01 Oct 2023 11:25):    Growth in anaerobic bottle: Gram Positive Cocci in Clusters    Growth in aerobic bottle: Gram Positive Cocci in Clusters    Culture - Blood (collected 30 Sep 2023 19:10)  Source: .Blood Blood-Peripheral  Gram Stain (01 Oct 2023 11:02):    Growth in anaerobic bottle: Gram Positive Cocci in Clusters    Growth in aerobic bottle: Gram Positive Cocci in Clusters  Preliminary Report (01 Oct 2023 11:02):    Growth in anaerobic bottle: Gram Positive Cocci in Clusters    Growth in aerobic bottle: Gram Positive Cocci in Clusters    Direct identification is available within approximately 3-5    hours either by Blood Panel Multiplexed PCR or Direct    MALDI-TOF. Details: [email or web address]  Organism: Blood Culture PCR (01 Oct 2023 11:32)  Organism: Blood Culture PCR (01 Oct 2023 11:32)      Consultant notes reviewed : YES [x ] ; NO [ ]      Radiology and additional tests:  < from: Xray Chest 1 View- PORTABLE-Routine (Xray Chest 1 View- PORTABLE-Routine .) (10.01.23 @ 08:39) >    IMPRESSION: Cardiomegaly. Right lower lobe infiltrate/pneumonia and   associated effusion. Patchy left lower lobe areas of   infiltrate/atelectasis.       < end of copied text >   JANEE ARCEO  78y  Male  Admitting: BRANDONJeri Laineronan    HPI:  77 yo M pmhx ESRD came to ED complaining of fatigue. Patient noted to be febrile to 102.2 in ED with complains of cough and wheezing. Received 1l NS bolus and noted to be hypotensive to 80's systolic. Complained of some abdominal pain but unsure why he is in the hospital. Poor historian and unable to fully participate in ROS     PAST MEDICAL & SURGICAL HISTORY:  Chronic atrial fibrillation      History of BPH      CAD (coronary artery disease)      Coronary stent patent      ESRD on dialysis      History of GI bleed      Mitral valve replaced        HEALTH ISSUES - PROBLEM Dx:  Chronic atrial fibrillation    History of BPH    ESRD on dialysis    CAD (coronary artery disease)    Coronary stent patent    Mitral valve replaced      MEDICATIONS  (STANDING):  albumin human 25% IVPB 50 milliLiter(s) IV Intermittent every 6 hours  aspirin  chewable 81 milliGRAM(s) Oral daily  folic acid 1 milliGRAM(s) Oral daily  hydrocortisone hemorrhoidal Suppository 1 Suppository(s) Rectal two times a day  influenza  Vaccine (HIGH DOSE) 0.7 milliLiter(s) IntraMuscular once  levothyroxine 137 MICROGram(s) Oral daily  meropenem  IVPB      meropenem  IVPB 500 milliGRAM(s) IV Intermittent every 24 hours  mesalamine Suppository 1000 milliGRAM(s) Rectal at bedtime  midodrine 20 milliGRAM(s) Oral <User Schedule>  midodrine 10 milliGRAM(s) Oral every 8 hours  norepinephrine Infusion 0.05 MICROgram(s)/kG/Min (6.81 mL/Hr) IV Continuous <Continuous>  pantoprazole    Tablet 40 milliGRAM(s) Oral before breakfast  polyethylene glycol 3350 17 Gram(s) Oral daily  senna 2 Tablet(s) Oral at bedtime  vancomycin  IVPB      vancomycin  IVPB 1000 milliGRAM(s) IV Intermittent every 24 hours    MEDICATIONS  (PRN):  aluminum hydroxide/magnesium hydroxide/simethicone Suspension 10 milliLiter(s) Oral every 6 hours PRN dyspepsia    Allergies    levofloxacin (Unknown)  alfuzosin (Unknown)  tamsulosin (Unknown)  Myrbetriq (Unknown)    FAMILY HISTORY: NC in first degree relatives      SOCIAL HISTORY: No EtOH, no tobacco    REVIEW OF SYSTEMS:    CONSTITUTIONAL: No chills  EYES/ENT: No throat pain   NECK: No pain or stiffness  RESPIRATORY: No hemoptysis  CARDIOVASCULAR: No chest pain or palpitations  GASTROINTESTINAL: No hematemesis; No melena or hematochezia.  GENITOURINARY: No hematuria  NEUROLOGICAL: +weakness  SKIN: No itching   All other review of systems is negative unless indicated above.      T(F): 98 (10-02-23 @ 03:00), Max: 98.7 (10-01-23 @ 23:00)  HR: 91 (10-02-23 @ 07:30)  BP: 105/56 (10-02-23 @ 07:30)  RR: 15 (10-02-23 @ 07:30)  SpO2: 97% (10-02-23 @ 07:30)      GENERAL: NAD, well-developed  HEAD:  Atraumatic, Normocephalic  EYES: EOMI, PERRLA, conjunctiva and sclera clear  NECK: Supple, No JVD  CHEST/LUNG: +few scattered rhonchi ant.  HEART: Regular rate and rhythm; No murmurs, rubs, or gallops  ABDOMEN: Soft, Nontender, Nondistended; No palpable masses  EXTREMITIES:  no calf tenderness elicited  NEUROLOGY: awake, alert  SKIN: No petechiae      Labs:             9.2    9.51  )-----------( 50       ( 10-02 @ 05:00 )             27.9                10.4   10.77 )-----------( 56       ( 10-01 @ 09:40 )             31.9                9.4    8.07  )-----------( 60       ( 10-01 @ 05:30 )             29.3                9.4    11.13 )-----------( 93       ( 09-30 @ 19:15 )             28.6       10-02    134<L>  |  101  |  63<H>  ----------------------------<  88  4.5   |  23  |  4.45<H>    Ca    8.4      02 Oct 2023 05:00  Phos  3.3     10-02  Mg     2.3     10-02    TPro  6.0  /  Alb  2.0<L>  /  TBili  0.8  /  DBili  x   /  AST  67<H>  /  ALT  63<H>  /  AlkPhos  162<H>  10-01    Magnesium: 2.3 mg/dL [1.6 - 2.6] (10-02 @ 05:00)  Phosphorus: 3.3 mg/dL [2.5 - 4.5] (10-02 @ 05:00)  Magnesium: 2.2 mg/dL [1.6 - 2.6] (10-01 @ 19:45)  Phosphorus: 3.3 mg/dL [2.5 - 4.5] (10-01 @ 19:45)  Lactate Dehydrogenase, Serum: 232 U/L [50 - 242] (10-01 @ 09:40)  Magnesium: 2.3 mg/dL [1.6 - 2.6] (10-01 @ 09:40)    PT/INR - ( 01 Oct 2023 09:40 )   PT: 21.7 sec;   INR: 1.89 ratio         PTT - ( 01 Oct 2023 09:40 )  PTT:34.6 sec      Culture - Blood (collected 30 Sep 2023 19:15)  Source: .Blood Blood-Peripheral  Gram Stain (01 Oct 2023 11:24):    Growth in anaerobic bottle: Gram Positive Cocci in Clusters    Growth in aerobic bottle: Gram Positive Cocci in Clusters  Preliminary Report (01 Oct 2023 11:25):    Growth in anaerobic bottle: Gram Positive Cocci in Clusters    Growth in aerobic bottle: Gram Positive Cocci in Clusters    Culture - Blood (collected 30 Sep 2023 19:10)  Source: .Blood Blood-Peripheral  Gram Stain (01 Oct 2023 11:02):    Growth in anaerobic bottle: Gram Positive Cocci in Clusters    Growth in aerobic bottle: Gram Positive Cocci in Clusters  Preliminary Report (01 Oct 2023 11:02):    Growth in anaerobic bottle: Gram Positive Cocci in Clusters    Growth in aerobic bottle: Gram Positive Cocci in Clusters    Direct identification is available within approximately 3-5    hours either by Blood Panel Multiplexed PCR or Direct    MALDI-TOF. Details: [email or web address]  Organism: Blood Culture PCR (01 Oct 2023 11:32)  Organism: Blood Culture PCR (01 Oct 2023 11:32)      Consultant notes reviewed : YES [x ] ; NO [ ]      Radiology and additional tests:  < from: Xray Chest 1 View- PORTABLE-Routine (Xray Chest 1 View- PORTABLE-Routine .) (10.01.23 @ 08:39) >    IMPRESSION: Cardiomegaly. Right lower lobe infiltrate/pneumonia and   associated effusion. Patchy left lower lobe areas of   infiltrate/atelectasis.       < end of copied text >   JANEE ARCEO  78y  Male  Admitting: BRANDONJeri Laineronan    HPI:  79 yo M pmhx ESRD came to ED complaining of fatigue. Patient noted to be febrile to 102.2 in ED with complains of cough and wheezing. Received 1l NS bolus and noted to be hypotensive to 80's systolic. Complained of some abdominal pain but unsure why he is in the hospital. Poor historian and unable to fully participate in ROS     PAST MEDICAL & SURGICAL HISTORY:  Chronic atrial fibrillation      History of BPH      CAD (coronary artery disease)      Coronary stent patent      ESRD on dialysis      History of GI bleed      Mitral valve replaced        HEALTH ISSUES - PROBLEM Dx:  Chronic atrial fibrillation    History of BPH    ESRD on dialysis    CAD (coronary artery disease)    Coronary stent patent    Mitral valve replaced      MEDICATIONS  (STANDING):  albumin human 25% IVPB 50 milliLiter(s) IV Intermittent every 6 hours  aspirin  chewable 81 milliGRAM(s) Oral daily  folic acid 1 milliGRAM(s) Oral daily  hydrocortisone hemorrhoidal Suppository 1 Suppository(s) Rectal two times a day  influenza  Vaccine (HIGH DOSE) 0.7 milliLiter(s) IntraMuscular once  levothyroxine 137 MICROGram(s) Oral daily  meropenem  IVPB      meropenem  IVPB 500 milliGRAM(s) IV Intermittent every 24 hours  mesalamine Suppository 1000 milliGRAM(s) Rectal at bedtime  midodrine 20 milliGRAM(s) Oral <User Schedule>  midodrine 10 milliGRAM(s) Oral every 8 hours  norepinephrine Infusion 0.05 MICROgram(s)/kG/Min (6.81 mL/Hr) IV Continuous <Continuous>  pantoprazole    Tablet 40 milliGRAM(s) Oral before breakfast  polyethylene glycol 3350 17 Gram(s) Oral daily  senna 2 Tablet(s) Oral at bedtime  vancomycin  IVPB      vancomycin  IVPB 1000 milliGRAM(s) IV Intermittent every 24 hours    MEDICATIONS  (PRN):  aluminum hydroxide/magnesium hydroxide/simethicone Suspension 10 milliLiter(s) Oral every 6 hours PRN dyspepsia    Allergies    levofloxacin (Unknown)  alfuzosin (Unknown)  tamsulosin (Unknown)  Myrbetriq (Unknown)    FAMILY HISTORY: NC in first degree relatives      SOCIAL HISTORY: No EtOH, no tobacco    REVIEW OF SYSTEMS:    CONSTITUTIONAL: No chills  EYES/ENT: No throat pain   NECK: No pain or stiffness  RESPIRATORY: No hemoptysis  CARDIOVASCULAR: No chest pain or palpitations  GASTROINTESTINAL: No hematemesis; No melena or hematochezia.  GENITOURINARY: No hematuria  NEUROLOGICAL: +weakness  SKIN: No itching   All other review of systems is negative unless indicated above.      T(F): 98 (10-02-23 @ 03:00), Max: 98.7 (10-01-23 @ 23:00)  HR: 91 (10-02-23 @ 07:30)  BP: 105/56 (10-02-23 @ 07:30)  RR: 15 (10-02-23 @ 07:30)  SpO2: 97% (10-02-23 @ 07:30)      GENERAL: NAD, well-developed  HEAD:  Atraumatic, Normocephalic  EYES: EOMI, PERRLA, conjunctiva and sclera clear  NECK: Supple, No JVD  CHEST/LUNG: +few scattered rhonchi ant.  HEART: Regular rate and rhythm; No murmurs, rubs, or gallops  ABDOMEN: Soft, Nontender, Nondistended; No palpable masses  EXTREMITIES:  no calf tenderness elicited  NEUROLOGY: awake, alert  SKIN: No petechiae      Labs:             9.2    9.51  )-----------( 50       ( 10-02 @ 05:00 )             27.9                10.4   10.77 )-----------( 56       ( 10-01 @ 09:40 )             31.9                9.4    8.07  )-----------( 60       ( 10-01 @ 05:30 )             29.3                9.4    11.13 )-----------( 93       ( 09-30 @ 19:15 )             28.6       10-02    134<L>  |  101  |  63<H>  ----------------------------<  88  4.5   |  23  |  4.45<H>    Ca    8.4      02 Oct 2023 05:00  Phos  3.3     10-02  Mg     2.3     10-02    TPro  6.0  /  Alb  2.0<L>  /  TBili  0.8  /  DBili  x   /  AST  67<H>  /  ALT  63<H>  /  AlkPhos  162<H>  10-01    Magnesium: 2.3 mg/dL [1.6 - 2.6] (10-02 @ 05:00)  Phosphorus: 3.3 mg/dL [2.5 - 4.5] (10-02 @ 05:00)  Magnesium: 2.2 mg/dL [1.6 - 2.6] (10-01 @ 19:45)  Phosphorus: 3.3 mg/dL [2.5 - 4.5] (10-01 @ 19:45)  Lactate Dehydrogenase, Serum: 232 U/L [50 - 242] (10-01 @ 09:40)  Magnesium: 2.3 mg/dL [1.6 - 2.6] (10-01 @ 09:40)    PT/INR - ( 01 Oct 2023 09:40 )   PT: 21.7 sec;   INR: 1.89 ratio         PTT - ( 01 Oct 2023 09:40 )  PTT:34.6 sec      Culture - Blood (collected 30 Sep 2023 19:15)  Source: .Blood Blood-Peripheral  Gram Stain (01 Oct 2023 11:24):    Growth in anaerobic bottle: Gram Positive Cocci in Clusters    Growth in aerobic bottle: Gram Positive Cocci in Clusters  Preliminary Report (01 Oct 2023 11:25):    Growth in anaerobic bottle: Gram Positive Cocci in Clusters    Growth in aerobic bottle: Gram Positive Cocci in Clusters    Culture - Blood (collected 30 Sep 2023 19:10)  Source: .Blood Blood-Peripheral  Gram Stain (01 Oct 2023 11:02):    Growth in anaerobic bottle: Gram Positive Cocci in Clusters    Growth in aerobic bottle: Gram Positive Cocci in Clusters  Preliminary Report (01 Oct 2023 11:02):    Growth in anaerobic bottle: Gram Positive Cocci in Clusters    Growth in aerobic bottle: Gram Positive Cocci in Clusters    Direct identification is available within approximately 3-5    hours either by Blood Panel Multiplexed PCR or Direct    MALDI-TOF. Details: [email or web address]  Organism: Blood Culture PCR (01 Oct 2023 11:32)  Organism: Blood Culture PCR (01 Oct 2023 11:32)      Consultant notes reviewed : YES [x ] ; NO [ ]      Radiology and additional tests:  < from: Xray Chest 1 View- PORTABLE-Routine (Xray Chest 1 View- PORTABLE-Routine .) (10.01.23 @ 08:39) >    IMPRESSION: Cardiomegaly. Right lower lobe infiltrate/pneumonia and   associated effusion. Patchy left lower lobe areas of   infiltrate/atelectasis.       < end of copied text >

## 2023-10-02 NOTE — BRIEF OPERATIVE NOTE - VENOUS THROMBOEMBOLISM PROPHYLAXIS THERAPY
Meds obtained from pharmacy and provided to nurse     and friend Stevan Torres here  Advised insurance did not cover wheelchair, they cannot afford  Aware  has provided  Script provided by MD    States airline has cleared pt for flight  They feel pt will do well  Aware she needs assistance with ambulation, does sit up in chair    They stated they have no concerns with DC and her return home on airline sequential compression devices

## 2023-10-02 NOTE — PROGRESS NOTE ADULT - SUBJECTIVE AND OBJECTIVE BOX
Follow-up Critical Care Progress Note  Chief Complaint : Pneumonia due to infectious organism      patient seen and examined  more awake today  no cp, sob, palp, n/v  remains confused but more oriented      Allergies :levofloxacin (Unknown)  alfuzosin (Unknown)  tamsulosin (Unknown)  Myrbetriq (Unknown)      PAST MEDICAL & SURGICAL HISTORY:  Chronic atrial fibrillation    History of BPH    CAD (coronary artery disease)    Coronary stent patent    ESRD on dialysis    History of GI bleed    Mitral valve replaced        Medications:  MEDICATIONS  (STANDING):  albumin human 25% IVPB 50 milliLiter(s) IV Intermittent every 6 hours  aspirin  chewable 81 milliGRAM(s) Oral daily  folic acid 1 milliGRAM(s) Oral daily  hydrocortisone hemorrhoidal Suppository 1 Suppository(s) Rectal two times a day  influenza  Vaccine (HIGH DOSE) 0.7 milliLiter(s) IntraMuscular once  levothyroxine 137 MICROGram(s) Oral daily  meropenem  IVPB      meropenem  IVPB 500 milliGRAM(s) IV Intermittent every 24 hours  mesalamine Suppository 1000 milliGRAM(s) Rectal at bedtime  midodrine 10 milliGRAM(s) Oral every 8 hours  norepinephrine Infusion 0.05 MICROgram(s)/kG/Min (6.81 mL/Hr) IV Continuous <Continuous>  pantoprazole    Tablet 40 milliGRAM(s) Oral before breakfast  polyethylene glycol 3350 17 Gram(s) Oral daily  senna 2 Tablet(s) Oral at bedtime    MEDICATIONS  (PRN):  aluminum hydroxide/magnesium hydroxide/simethicone Suspension 10 milliLiter(s) Oral every 6 hours PRN dyspepsia      Antibiotics History  caspofungin IVPB    , 10-01-23 @ 08:49  caspofungin IVPB 50 milliGRAM(s) IV Intermittent every 24 hours, 10-02-23 @ 08:48  caspofungin IVPB 70 milliGRAM(s) IV Intermittent once, 10-01-23 @ 08:48  cefepime   IVPB 1000 milliGRAM(s) IV Intermittent once, 09-30-23 @ 19:12, Stop order after: 1 Doses  meropenem  IVPB 500 milliGRAM(s) IV Intermittent once, 10-01-23 @ 09:02, Stop order after: 1 Doses  meropenem  IVPB    , 10-01-23 @ 09:03  meropenem  IVPB 500 milliGRAM(s) IV Intermittent every 24 hours, 10-02-23 @ 09:02, Stop order after: 8 Days  piperacillin/tazobactam IVPB.. 3.375 Gram(s) IV Intermittent every 12 hours, 09-30-23 @ 22:40, Stop order after: 7 Days  vancomycin  IVPB    , 10-01-23 @ 09:00  vancomycin  IVPB 1000 milliGRAM(s) IV Intermittent once, 10-01-23 @ 09:00, Stop order after: 1 Doses  vancomycin  IVPB 1000 milliGRAM(s) IV Intermittent every 24 hours, 10-02-23 @ 09:00, Stop order after: 1 Doses  vancomycin  IVPB. 1000 milliGRAM(s) IV Intermittent once, 09-30-23 @ 19:35, Stop order after: 1 Doses      Heme Medications   aspirin  chewable 81 milliGRAM(s) Oral daily, 10-01-23 @ 12:50      GI Medications  aluminum hydroxide/magnesium hydroxide/simethicone Suspension 10 milliLiter(s) Oral every 6 hours, 10-01-23 @ 13:00, Routine PRN  mesalamine Suppository 1000 milliGRAM(s) Rectal at bedtime, 10-01-23 @ 12:49, Routine  pantoprazole    Tablet 40 milliGRAM(s) Oral before breakfast, 10-01-23 @ 08:30, Routine  polyethylene glycol 3350 17 Gram(s) Oral daily, 10-01-23 @ 12:36, Routine  senna 2 Tablet(s) Oral at bedtime, 10-01-23 @ 12:36, Routine      Home Medications:  aluminum hydroxide-magnesium hydroxide 200 mg-200 mg/5 mL oral suspension: 10 milliliter(s) orally every 6 hours as needed for dyspepsia (01 Oct 2023 12:25)  apixaban 5 mg oral tablet: 1 tab(s) orally 2 times a day (01 Oct 2023 12:21)  aspirin 81 mg oral tablet: 1 tab(s) orally once a day (01 Oct 2023 12:21)  clotrimazole 1% topical cream: Apply topically to affected area 2 times a day (01 Oct 2023 12:21)  D3 50 mcg (2000 intl units) oral capsule: 1 cap(s) orally once a day (01 Oct 2023 12:21)  erythromycin 0.5% ophthalmic ointment: 1 application in each affected eye 2 times a day (01 Oct 2023 12:21)  ferrous sulfate 324 mg (65 mg elemental iron) oral tablet: 1 tab(s) orally once a day (01 Oct 2023 12:21)  folic acid 1 mg oral tablet: 1 tab(s) orally once a day (01 Oct 2023 12:21)  hydrocortisone 25 mg rectal suppository: 1 suppository(ies) rectally 2 times a day (01 Oct 2023 12:25)  mesalamine 1000 mg rectal suppository: 1 suppository(ies) rectally once a day (at bedtime) (01 Oct 2023 12:36)  metoprolol succinate 100 mg oral tablet, extended release: 1 tab(s) orally once a day (01 Oct 2023 12:21)  midodrine 10 mg oral tablet: 2 tab(s) orally 3 times a week on monday wednesday friday before HD (01 Oct 2023 12:21)  nystatin 100,000 units/g topical powder: Apply topically to affected area 3 times a day (01 Oct 2023 12:36)  pantoprazole 40 mg oral delayed release tablet: 1 tab(s) orally once a day (01 Oct 2023 12:21)  polyethylene glycol 3350 oral powder for reconstitution: 17 gram(s) orally once a day (01 Oct 2023 12:25)  polymyxin B-trimethoprim 10,000 units-1 mg/mL ophthalmic solution: 1 drop(s) in each affected eye 4 times a day (01 Oct 2023 12:21)  senna leaf extract oral tablet: 2 tab(s) orally once a day (at bedtime) (01 Oct 2023 12:21)  Synthroid 137 mcg (0.137 mg) oral tablet: 1 tab(s) orally once a day (01 Oct 2023 12:21)    COVID  10-01-23 @ 15:45  COVID -   NotDetec  09-30-23 @ 19:15  COVID -   NotDetec      COVID Biomarkers    10-01-23 @ 09:40 ESR --  ---  CRP --  ---  DDimer  2066<H>   ---      ---   Ferritin --         WBC Trend  10-02-23 @ 05:00   -  9.51  10-01-23 @ 09:40   -  10.77<H>  10-01-23 @ 05:30   -  8.07  09-30-23 @ 19:15   -  11.13<H>    H/H Trend  10-02-23 @ 05:00   -   9.2<L>/ 27.9<L>  10-01-23 @ 09:40   -   10.4<L>/ 31.9<L>  10-01-23 @ 05:30   -   9.4<L>/ 29.3<L>  09-30-23 @ 19:15   -   9.4<L>/ 28.6<L>    Stool Occult Blood    Platelet Trend  10-02-23 @ 05:00   -  50<L>  10-01-23 @ 09:40   -  56<L>  10-01-23 @ 05:30   -  60<L>  09-30-23 @ 19:15   -  93<L>    Trend Sodium  10-02-23 @ 05:00   -  134<L>  10-01-23 @ 19:45   -  134<L>  10-01-23 @ 09:40   -  135  10-01-23 @ 05:30   -  136  09-30-23 @ 19:15   -  133<L>    Trend Potassium  10-02-23 @ 05:00   -  4.5  10-01-23 @ 19:45   -  4.1  10-01-23 @ 09:40   -  3.4<L>  10-01-23 @ 05:30   -  3.2<L>  09-30-23 @ 19:15   -  3.0<L>    Trend Bun/Cr  10-02-23 @ 05:00  BUN/CR -  63<H> / 4.45<H>  10-01-23 @ 19:45  BUN/CR -  60<H> / 4.06<H>  10-01-23 @ 09:40  BUN/CR -  55<H> / 3.89<H>  10-01-23 @ 05:30  BUN/CR -  48<H> / 3.42<H>  09-30-23 @ 19:15  BUN/CR -  45<H> / 3.59<H>    Lactic Acid Trend  10-01-23 @ 05:30   -   1.6  09-30-23 @ 19:15   -   1.1    ABG Trend    Trend AST/ALT/ALK Phos/Bili  10-01-23 @ 05:30   67<H>/63<H>/162<H>/0.8  09-30-23 @ 19:15   86<H>/74<H>/207<H>/0.8         Albumin Trend  10-01-23 @ 05:30   -   2.0<L>  09-30-23 @ 19:15   -   2.4<L>      PTT - PT - INR Trend  10-01-23 @ 09:40   -   34.6 - 21.7<H> - 1.89<H>  09-30-23 @ 19:15   -   35.6 - 29.8<H> - 2.69<H>    Glucose Trend  10-02-23 @ 05:00   -  88 -- --  10-01-23 @ 19:45   -  108<H> -- --  10-01-23 @ 09:40   -  101<H> -- --  10-01-23 @ 05:30   -  89 -- --  09-30-23 @ 19:15   -  113<H> -- --        LABS:                        9.2    9.51  )-----------( 50       ( 02 Oct 2023 05:00 )             27.9     10-02    134<L>  |  101  |  63<H>  ----------------------------<  88  4.5   |  23  |  4.45<H>    Ca    8.4      02 Oct 2023 05:00  Phos  3.3     10-02  Mg     2.3     10-02    TPro  6.0  /  Alb  2.0<L>  /  TBili  0.8  /  DBili  x   /  AST  67<H>  /  ALT  63<H>  /  AlkPhos  162<H>  10-01    HIT ab -- 10-01 @ 09:40  HIT ab EIA --  D Dimer -2066       CULTURES: (if applicable)    Culture - Blood (collected 09-30-23 @ 19:15)  Source: .Blood Blood-Peripheral  Gram Stain (10-01-23 @ 11:24):    Growth in anaerobic bottle: Gram Positive Cocci in Clusters    Growth in aerobic bottle: Gram Positive Cocci in Clusters  Preliminary Report (10-01-23 @ 11:25):    Growth in anaerobic bottle: Gram Positive Cocci in Clusters    Growth in aerobic bottle: Gram Positive Cocci in Clusters    Culture - Blood (collected 09-30-23 @ 19:10)  Source: .Blood Blood-Peripheral  Gram Stain (10-01-23 @ 11:02):    Growth in anaerobic bottle: Gram Positive Cocci in Clusters    Growth in aerobic bottle: Gram Positive Cocci in Clusters  Preliminary Report (10-01-23 @ 11:02):    Growth in anaerobic bottle: Gram Positive Cocci in Clusters    Growth in aerobic bottle: Gram Positive Cocci in Clusters    Direct identification is available within approximately 3-5    hours either by Blood Panel Multiplexed PCR or Direct    MALDI-TOF. Details: [email or web address]  Organism: Blood Culture PCR (10-01-23 @ 11:32)  Organism: Blood Culture PCR (10-01-23 @ 11:32)      Method Type: PCR      -  Methicillin resistant Staphylococcus aureus (MRSA): Detec      Rapid RVP Result: NotDetec (10-01-23 @ 15:45)       < from: TTE Echo Complete w/o Contrast w/ Doppler (10.01.23 @ 10:26) >    Summary:   1. 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 Biatrial enlargement   4. Left ventricular ejection fraction, by visual estimation, is 45 to   50%.   5. Mild mitral annular calcification.   6. Mild-moderate tricuspid regurgitation.   7. Mild aortic valve stenosis.    < end of copied text >      VITALS:  T(C): 36.7 (10-02-23 @ 03:00), Max: 37.1 (10-01-23 @ 23:00)  T(F): 98 (10-02-23 @ 03:00), Max: 98.7 (10-01-23 @ 23:00)  HR: 91 (10-02-23 @ 07:30) (68 - 102)  BP: 105/56 (10-02-23 @ 07:30) (85/52 - 135/82)  BP(mean): 72 (10-02-23 @ 07:30) (58 - 97)  ABP: --  ABP(mean): --  RR: 15 (10-02-23 @ 07:30) (11 - 24)  SpO2: 97% (10-02-23 @ 07:30) (92% - 100%)  CVP(mm Hg): --  CVP(cm H2O): --    Ins and Outs     10-01-23 @ 07:01  -  10-02-23 @ 07:00  --------------------------------------------------------  IN: 933.3 mL / OUT: 0 mL / NET: 933.3 mL        Height (cm): 172.7 (09-30-23 @ 19:00)  Weight (kg): 72.6 (09-30-23 @ 19:36)  BMI (kg/m2): 24.3 (09-30-23 @ 19:36)        I&O's Detail    01 Oct 2023 07:01  -  02 Oct 2023 07:00  --------------------------------------------------------  IN:    Albumin 5% - 50 mL: 150 mL    IV PiggyBack: 50 mL    IV PiggyBack: 250 mL    IV PiggyBack: 100 mL    IV PiggyBack: 100 mL    Norepinephrine: 283.3 mL  Total IN: 933.3 mL    OUT:  Total OUT: 0 mL    Total NET: 933.3 mL          Follow-up Critical Care Progress Note  Chief Complaint : Pneumonia due to infectious organism      patient seen and examined  more awake today  no cp, sob, palp, n/v  remains confused but more oriented      Allergies :levofloxacin (Unknown)  alfuzosin (Unknown)  tamsulosin (Unknown)  Myrbetriq (Unknown)      PAST MEDICAL & SURGICAL HISTORY:  Chronic atrial fibrillation    History of BPH    CAD (coronary artery disease)    Coronary stent patent    ESRD on dialysis    History of GI bleed    Mitral valve replaced        Medications:  MEDICATIONS  (STANDING):  albumin human 25% IVPB 50 milliLiter(s) IV Intermittent every 6 hours  aspirin  chewable 81 milliGRAM(s) Oral daily  folic acid 1 milliGRAM(s) Oral daily  hydrocortisone hemorrhoidal Suppository 1 Suppository(s) Rectal two times a day  influenza  Vaccine (HIGH DOSE) 0.7 milliLiter(s) IntraMuscular once  levothyroxine 137 MICROGram(s) Oral daily  meropenem  IVPB      meropenem  IVPB 500 milliGRAM(s) IV Intermittent every 24 hours  mesalamine Suppository 1000 milliGRAM(s) Rectal at bedtime  midodrine 10 milliGRAM(s) Oral every 8 hours  norepinephrine Infusion 0.05 MICROgram(s)/kG/Min (6.81 mL/Hr) IV Continuous <Continuous>  pantoprazole    Tablet 40 milliGRAM(s) Oral before breakfast  polyethylene glycol 3350 17 Gram(s) Oral daily  senna 2 Tablet(s) Oral at bedtime    MEDICATIONS  (PRN):  aluminum hydroxide/magnesium hydroxide/simethicone Suspension 10 milliLiter(s) Oral every 6 hours PRN dyspepsia      Antibiotics History  caspofungin IVPB    , 10-01-23 @ 08:49  caspofungin IVPB 50 milliGRAM(s) IV Intermittent every 24 hours, 10-02-23 @ 08:48  caspofungin IVPB 70 milliGRAM(s) IV Intermittent once, 10-01-23 @ 08:48  cefepime   IVPB 1000 milliGRAM(s) IV Intermittent once, 09-30-23 @ 19:12, Stop order after: 1 Doses  meropenem  IVPB 500 milliGRAM(s) IV Intermittent once, 10-01-23 @ 09:02, Stop order after: 1 Doses  meropenem  IVPB    , 10-01-23 @ 09:03  meropenem  IVPB 500 milliGRAM(s) IV Intermittent every 24 hours, 10-02-23 @ 09:02, Stop order after: 8 Days  piperacillin/tazobactam IVPB.. 3.375 Gram(s) IV Intermittent every 12 hours, 09-30-23 @ 22:40, Stop order after: 7 Days  vancomycin  IVPB    , 10-01-23 @ 09:00  vancomycin  IVPB 1000 milliGRAM(s) IV Intermittent once, 10-01-23 @ 09:00, Stop order after: 1 Doses  vancomycin  IVPB 1000 milliGRAM(s) IV Intermittent every 24 hours, 10-02-23 @ 09:00, Stop order after: 1 Doses  vancomycin  IVPB. 1000 milliGRAM(s) IV Intermittent once, 09-30-23 @ 19:35, Stop order after: 1 Doses      Heme Medications   aspirin  chewable 81 milliGRAM(s) Oral daily, 10-01-23 @ 12:50      GI Medications  aluminum hydroxide/magnesium hydroxide/simethicone Suspension 10 milliLiter(s) Oral every 6 hours, 10-01-23 @ 13:00, Routine PRN  mesalamine Suppository 1000 milliGRAM(s) Rectal at bedtime, 10-01-23 @ 12:49, Routine  pantoprazole    Tablet 40 milliGRAM(s) Oral before breakfast, 10-01-23 @ 08:30, Routine  polyethylene glycol 3350 17 Gram(s) Oral daily, 10-01-23 @ 12:36, Routine  senna 2 Tablet(s) Oral at bedtime, 10-01-23 @ 12:36, Routine      Home Medications:  aluminum hydroxide-magnesium hydroxide 200 mg-200 mg/5 mL oral suspension: 10 milliliter(s) orally every 6 hours as needed for dyspepsia (01 Oct 2023 12:25)  apixaban 5 mg oral tablet: 1 tab(s) orally 2 times a day (01 Oct 2023 12:21)  aspirin 81 mg oral tablet: 1 tab(s) orally once a day (01 Oct 2023 12:21)  clotrimazole 1% topical cream: Apply topically to affected area 2 times a day (01 Oct 2023 12:21)  D3 50 mcg (2000 intl units) oral capsule: 1 cap(s) orally once a day (01 Oct 2023 12:21)  erythromycin 0.5% ophthalmic ointment: 1 application in each affected eye 2 times a day (01 Oct 2023 12:21)  ferrous sulfate 324 mg (65 mg elemental iron) oral tablet: 1 tab(s) orally once a day (01 Oct 2023 12:21)  folic acid 1 mg oral tablet: 1 tab(s) orally once a day (01 Oct 2023 12:21)  hydrocortisone 25 mg rectal suppository: 1 suppository(ies) rectally 2 times a day (01 Oct 2023 12:25)  mesalamine 1000 mg rectal suppository: 1 suppository(ies) rectally once a day (at bedtime) (01 Oct 2023 12:36)  metoprolol succinate 100 mg oral tablet, extended release: 1 tab(s) orally once a day (01 Oct 2023 12:21)  midodrine 10 mg oral tablet: 2 tab(s) orally 3 times a week on monday wednesday friday before HD (01 Oct 2023 12:21)  nystatin 100,000 units/g topical powder: Apply topically to affected area 3 times a day (01 Oct 2023 12:36)  pantoprazole 40 mg oral delayed release tablet: 1 tab(s) orally once a day (01 Oct 2023 12:21)  polyethylene glycol 3350 oral powder for reconstitution: 17 gram(s) orally once a day (01 Oct 2023 12:25)  polymyxin B-trimethoprim 10,000 units-1 mg/mL ophthalmic solution: 1 drop(s) in each affected eye 4 times a day (01 Oct 2023 12:21)  senna leaf extract oral tablet: 2 tab(s) orally once a day (at bedtime) (01 Oct 2023 12:21)  Synthroid 137 mcg (0.137 mg) oral tablet: 1 tab(s) orally once a day (01 Oct 2023 12:21)    COVID  10-01-23 @ 15:45  COVID -   NotDetec  09-30-23 @ 19:15  COVID -   NotDetec      COVID Biomarkers    10-01-23 @ 09:40 ESR --  ---  CRP --  ---  DDimer  2066<H>   ---      ---   Ferritin --         WBC Trend  10-02-23 @ 05:00   -  9.51  10-01-23 @ 09:40   -  10.77<H>  10-01-23 @ 05:30   -  8.07  09-30-23 @ 19:15   -  11.13<H>    H/H Trend  10-02-23 @ 05:00   -   9.2<L>/ 27.9<L>  10-01-23 @ 09:40   -   10.4<L>/ 31.9<L>  10-01-23 @ 05:30   -   9.4<L>/ 29.3<L>  09-30-23 @ 19:15   -   9.4<L>/ 28.6<L>    Stool Occult Blood    Platelet Trend  10-02-23 @ 05:00   -  50<L>  10-01-23 @ 09:40   -  56<L>  10-01-23 @ 05:30   -  60<L>  09-30-23 @ 19:15   -  93<L>    Trend Sodium  10-02-23 @ 05:00   -  134<L>  10-01-23 @ 19:45   -  134<L>  10-01-23 @ 09:40   -  135  10-01-23 @ 05:30   -  136  09-30-23 @ 19:15   -  133<L>    Trend Potassium  10-02-23 @ 05:00   -  4.5  10-01-23 @ 19:45   -  4.1  10-01-23 @ 09:40   -  3.4<L>  10-01-23 @ 05:30   -  3.2<L>  09-30-23 @ 19:15   -  3.0<L>    Trend Bun/Cr  10-02-23 @ 05:00  BUN/CR -  63<H> / 4.45<H>  10-01-23 @ 19:45  BUN/CR -  60<H> / 4.06<H>  10-01-23 @ 09:40  BUN/CR -  55<H> / 3.89<H>  10-01-23 @ 05:30  BUN/CR -  48<H> / 3.42<H>  09-30-23 @ 19:15  BUN/CR -  45<H> / 3.59<H>    Lactic Acid Trend  10-01-23 @ 05:30   -   1.6  09-30-23 @ 19:15   -   1.1    ABG Trend    Trend AST/ALT/ALK Phos/Bili  10-01-23 @ 05:30   67<H>/63<H>/162<H>/0.8  09-30-23 @ 19:15   86<H>/74<H>/207<H>/0.8         Albumin Trend  10-01-23 @ 05:30   -   2.0<L>  09-30-23 @ 19:15   -   2.4<L>      PTT - PT - INR Trend  10-01-23 @ 09:40   -   34.6 - 21.7<H> - 1.89<H>  09-30-23 @ 19:15   -   35.6 - 29.8<H> - 2.69<H>    Glucose Trend  10-02-23 @ 05:00   -  88 -- --  10-01-23 @ 19:45   -  108<H> -- --  10-01-23 @ 09:40   -  101<H> -- --  10-01-23 @ 05:30   -  89 -- --  09-30-23 @ 19:15   -  113<H> -- --        LABS:                        9.2    9.51  )-----------( 50       ( 02 Oct 2023 05:00 )             27.9     10-02    134<L>  |  101  |  63<H>  ----------------------------<  88  4.5   |  23  |  4.45<H>    Ca    8.4      02 Oct 2023 05:00  Phos  3.3     10-02  Mg     2.3     10-02    TPro  6.0  /  Alb  2.0<L>  /  TBili  0.8  /  DBili  x   /  AST  67<H>  /  ALT  63<H>  /  AlkPhos  162<H>  10-01    HIT ab -- 10-01 @ 09:40  HIT ab EIA --  D Dimer -2066       CULTURES: (if applicable)    Culture - Blood (collected 09-30-23 @ 19:15)  Source: .Blood Blood-Peripheral  Gram Stain (10-01-23 @ 11:24):    Growth in anaerobic bottle: Gram Positive Cocci in Clusters    Growth in aerobic bottle: Gram Positive Cocci in Clusters  Preliminary Report (10-01-23 @ 11:25):    Growth in anaerobic bottle: Gram Positive Cocci in Clusters    Growth in aerobic bottle: Gram Positive Cocci in Clusters    Culture - Blood (collected 09-30-23 @ 19:10)  Source: .Blood Blood-Peripheral  Gram Stain (10-01-23 @ 11:02):    Growth in anaerobic bottle: Gram Positive Cocci in Clusters    Growth in aerobic bottle: Gram Positive Cocci in Clusters  Preliminary Report (10-01-23 @ 11:02):    Growth in anaerobic bottle: Gram Positive Cocci in Clusters    Growth in aerobic bottle: Gram Positive Cocci in Clusters    Direct identification is available within approximately 3-5    hours either by Blood Panel Multiplexed PCR or Direct    MALDI-TOF. Details: [email or web address]  Organism: Blood Culture PCR (10-01-23 @ 11:32)  Organism: Blood Culture PCR (10-01-23 @ 11:32)      Method Type: PCR      -  Methicillin resistant Staphylococcus aureus (MRSA): Detec      Rapid RVP Result: NotDetec (10-01-23 @ 15:45)       < from: TTE Echo Complete w/o Contrast w/ Doppler (10.01.23 @ 10:26) >    Summary:   1. 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 Biatrial enlargement   4. Left ventricular ejection fraction, by visual estimation, is 45 to   50%.   5. Mild mitral annular calcification.   6. Mild-moderate tricuspid regurgitation.   7. Mild aortic valve stenosis.    < end of copied text >      VITALS:  T(C): 36.7 (10-02-23 @ 03:00), Max: 37.1 (10-01-23 @ 23:00)  T(F): 98 (10-02-23 @ 03:00), Max: 98.7 (10-01-23 @ 23:00)  HR: 91 (10-02-23 @ 07:30) (68 - 102)  BP: 105/56 (10-02-23 @ 07:30) (85/52 - 135/82)  BP(mean): 72 (10-02-23 @ 07:30) (58 - 97)  ABP: --  ABP(mean): --  RR: 15 (10-02-23 @ 07:30) (11 - 24)  SpO2: 97% (10-02-23 @ 07:30) (92% - 100%)  CVP(mm Hg): --  CVP(cm H2O): --    Ins and Outs     10-01-23 @ 07:01  -  10-02-23 @ 07:00  --------------------------------------------------------  IN: 933.3 mL / OUT: 0 mL / NET: 933.3 mL        Height (cm): 172.7 (09-30-23 @ 19:00)  Weight (kg): 72.6 (09-30-23 @ 19:36)  BMI (kg/m2): 24.3 (09-30-23 @ 19:36)        I&O's Detail    01 Oct 2023 07:01  -  02 Oct 2023 07:00  --------------------------------------------------------  IN:    Albumin 5% - 50 mL: 150 mL    IV PiggyBack: 50 mL    IV PiggyBack: 250 mL    IV PiggyBack: 100 mL    IV PiggyBack: 100 mL    Norepinephrine: 283.3 mL  Total IN: 933.3 mL    OUT:  Total OUT: 0 mL    Total NET: 933.3 mL          Follow-up Critical Care Progress Note  Chief Complaint : Pneumonia due to infectious organism      patient seen and examined  more awake today  no cp, sob, palp, n/v  remains confused but more oriented      Allergies :levofloxacin (Unknown)  alfuzosin (Unknown)  tamsulosin (Unknown)  Myrbetriq (Unknown)      PAST MEDICAL & SURGICAL HISTORY:  Chronic atrial fibrillation    History of BPH    CAD (coronary artery disease)    Coronary stent patent    ESRD on dialysis    History of GI bleed    Mitral valve replaced        Medications:  MEDICATIONS  (STANDING):  albumin human 25% IVPB 50 milliLiter(s) IV Intermittent every 6 hours  aspirin  chewable 81 milliGRAM(s) Oral daily  folic acid 1 milliGRAM(s) Oral daily  hydrocortisone hemorrhoidal Suppository 1 Suppository(s) Rectal two times a day  influenza  Vaccine (HIGH DOSE) 0.7 milliLiter(s) IntraMuscular once  levothyroxine 137 MICROGram(s) Oral daily  meropenem  IVPB      meropenem  IVPB 500 milliGRAM(s) IV Intermittent every 24 hours  mesalamine Suppository 1000 milliGRAM(s) Rectal at bedtime  midodrine 10 milliGRAM(s) Oral every 8 hours  norepinephrine Infusion 0.05 MICROgram(s)/kG/Min (6.81 mL/Hr) IV Continuous <Continuous>  pantoprazole    Tablet 40 milliGRAM(s) Oral before breakfast  polyethylene glycol 3350 17 Gram(s) Oral daily  senna 2 Tablet(s) Oral at bedtime    MEDICATIONS  (PRN):  aluminum hydroxide/magnesium hydroxide/simethicone Suspension 10 milliLiter(s) Oral every 6 hours PRN dyspepsia      Antibiotics History  caspofungin IVPB    , 10-01-23 @ 08:49  caspofungin IVPB 50 milliGRAM(s) IV Intermittent every 24 hours, 10-02-23 @ 08:48  caspofungin IVPB 70 milliGRAM(s) IV Intermittent once, 10-01-23 @ 08:48  cefepime   IVPB 1000 milliGRAM(s) IV Intermittent once, 09-30-23 @ 19:12, Stop order after: 1 Doses  meropenem  IVPB 500 milliGRAM(s) IV Intermittent once, 10-01-23 @ 09:02, Stop order after: 1 Doses  meropenem  IVPB    , 10-01-23 @ 09:03  meropenem  IVPB 500 milliGRAM(s) IV Intermittent every 24 hours, 10-02-23 @ 09:02, Stop order after: 8 Days  piperacillin/tazobactam IVPB.. 3.375 Gram(s) IV Intermittent every 12 hours, 09-30-23 @ 22:40, Stop order after: 7 Days  vancomycin  IVPB    , 10-01-23 @ 09:00  vancomycin  IVPB 1000 milliGRAM(s) IV Intermittent once, 10-01-23 @ 09:00, Stop order after: 1 Doses  vancomycin  IVPB 1000 milliGRAM(s) IV Intermittent every 24 hours, 10-02-23 @ 09:00, Stop order after: 1 Doses  vancomycin  IVPB. 1000 milliGRAM(s) IV Intermittent once, 09-30-23 @ 19:35, Stop order after: 1 Doses      Heme Medications   aspirin  chewable 81 milliGRAM(s) Oral daily, 10-01-23 @ 12:50      GI Medications  aluminum hydroxide/magnesium hydroxide/simethicone Suspension 10 milliLiter(s) Oral every 6 hours, 10-01-23 @ 13:00, Routine PRN  mesalamine Suppository 1000 milliGRAM(s) Rectal at bedtime, 10-01-23 @ 12:49, Routine  pantoprazole    Tablet 40 milliGRAM(s) Oral before breakfast, 10-01-23 @ 08:30, Routine  polyethylene glycol 3350 17 Gram(s) Oral daily, 10-01-23 @ 12:36, Routine  senna 2 Tablet(s) Oral at bedtime, 10-01-23 @ 12:36, Routine      Home Medications:  aluminum hydroxide-magnesium hydroxide 200 mg-200 mg/5 mL oral suspension: 10 milliliter(s) orally every 6 hours as needed for dyspepsia (01 Oct 2023 12:25)  apixaban 5 mg oral tablet: 1 tab(s) orally 2 times a day (01 Oct 2023 12:21)  aspirin 81 mg oral tablet: 1 tab(s) orally once a day (01 Oct 2023 12:21)  clotrimazole 1% topical cream: Apply topically to affected area 2 times a day (01 Oct 2023 12:21)  D3 50 mcg (2000 intl units) oral capsule: 1 cap(s) orally once a day (01 Oct 2023 12:21)  erythromycin 0.5% ophthalmic ointment: 1 application in each affected eye 2 times a day (01 Oct 2023 12:21)  ferrous sulfate 324 mg (65 mg elemental iron) oral tablet: 1 tab(s) orally once a day (01 Oct 2023 12:21)  folic acid 1 mg oral tablet: 1 tab(s) orally once a day (01 Oct 2023 12:21)  hydrocortisone 25 mg rectal suppository: 1 suppository(ies) rectally 2 times a day (01 Oct 2023 12:25)  mesalamine 1000 mg rectal suppository: 1 suppository(ies) rectally once a day (at bedtime) (01 Oct 2023 12:36)  metoprolol succinate 100 mg oral tablet, extended release: 1 tab(s) orally once a day (01 Oct 2023 12:21)  midodrine 10 mg oral tablet: 2 tab(s) orally 3 times a week on monday wednesday friday before HD (01 Oct 2023 12:21)  nystatin 100,000 units/g topical powder: Apply topically to affected area 3 times a day (01 Oct 2023 12:36)  pantoprazole 40 mg oral delayed release tablet: 1 tab(s) orally once a day (01 Oct 2023 12:21)  polyethylene glycol 3350 oral powder for reconstitution: 17 gram(s) orally once a day (01 Oct 2023 12:25)  polymyxin B-trimethoprim 10,000 units-1 mg/mL ophthalmic solution: 1 drop(s) in each affected eye 4 times a day (01 Oct 2023 12:21)  senna leaf extract oral tablet: 2 tab(s) orally once a day (at bedtime) (01 Oct 2023 12:21)  Synthroid 137 mcg (0.137 mg) oral tablet: 1 tab(s) orally once a day (01 Oct 2023 12:21)    COVID  10-01-23 @ 15:45  COVID -   NotDetec  09-30-23 @ 19:15  COVID -   NotDetec      COVID Biomarkers    10-01-23 @ 09:40 ESR --  ---  CRP --  ---  DDimer  2066<H>   ---      ---   Ferritin --         WBC Trend  10-02-23 @ 05:00   -  9.51  10-01-23 @ 09:40   -  10.77<H>  10-01-23 @ 05:30   -  8.07  09-30-23 @ 19:15   -  11.13<H>    H/H Trend  10-02-23 @ 05:00   -   9.2<L>/ 27.9<L>  10-01-23 @ 09:40   -   10.4<L>/ 31.9<L>  10-01-23 @ 05:30   -   9.4<L>/ 29.3<L>  09-30-23 @ 19:15   -   9.4<L>/ 28.6<L>    Stool Occult Blood    Platelet Trend  10-02-23 @ 05:00   -  50<L>  10-01-23 @ 09:40   -  56<L>  10-01-23 @ 05:30   -  60<L>  09-30-23 @ 19:15   -  93<L>    Trend Sodium  10-02-23 @ 05:00   -  134<L>  10-01-23 @ 19:45   -  134<L>  10-01-23 @ 09:40   -  135  10-01-23 @ 05:30   -  136  09-30-23 @ 19:15   -  133<L>    Trend Potassium  10-02-23 @ 05:00   -  4.5  10-01-23 @ 19:45   -  4.1  10-01-23 @ 09:40   -  3.4<L>  10-01-23 @ 05:30   -  3.2<L>  09-30-23 @ 19:15   -  3.0<L>    Trend Bun/Cr  10-02-23 @ 05:00  BUN/CR -  63<H> / 4.45<H>  10-01-23 @ 19:45  BUN/CR -  60<H> / 4.06<H>  10-01-23 @ 09:40  BUN/CR -  55<H> / 3.89<H>  10-01-23 @ 05:30  BUN/CR -  48<H> / 3.42<H>  09-30-23 @ 19:15  BUN/CR -  45<H> / 3.59<H>    Lactic Acid Trend  10-01-23 @ 05:30   -   1.6  09-30-23 @ 19:15   -   1.1    ABG Trend    Trend AST/ALT/ALK Phos/Bili  10-01-23 @ 05:30   67<H>/63<H>/162<H>/0.8  09-30-23 @ 19:15   86<H>/74<H>/207<H>/0.8         Albumin Trend  10-01-23 @ 05:30   -   2.0<L>  09-30-23 @ 19:15   -   2.4<L>      PTT - PT - INR Trend  10-01-23 @ 09:40   -   34.6 - 21.7<H> - 1.89<H>  09-30-23 @ 19:15   -   35.6 - 29.8<H> - 2.69<H>    Glucose Trend  10-02-23 @ 05:00   -  88 -- --  10-01-23 @ 19:45   -  108<H> -- --  10-01-23 @ 09:40   -  101<H> -- --  10-01-23 @ 05:30   -  89 -- --  09-30-23 @ 19:15   -  113<H> -- --        LABS:                        9.2    9.51  )-----------( 50       ( 02 Oct 2023 05:00 )             27.9     10-02    134<L>  |  101  |  63<H>  ----------------------------<  88  4.5   |  23  |  4.45<H>    Ca    8.4      02 Oct 2023 05:00  Phos  3.3     10-02  Mg     2.3     10-02    TPro  6.0  /  Alb  2.0<L>  /  TBili  0.8  /  DBili  x   /  AST  67<H>  /  ALT  63<H>  /  AlkPhos  162<H>  10-01    HIT ab -- 10-01 @ 09:40  HIT ab EIA --  D Dimer -2066       CULTURES: (if applicable)    Culture - Blood (collected 09-30-23 @ 19:15)  Source: .Blood Blood-Peripheral  Gram Stain (10-01-23 @ 11:24):    Growth in anaerobic bottle: Gram Positive Cocci in Clusters    Growth in aerobic bottle: Gram Positive Cocci in Clusters  Preliminary Report (10-01-23 @ 11:25):    Growth in anaerobic bottle: Gram Positive Cocci in Clusters    Growth in aerobic bottle: Gram Positive Cocci in Clusters    Culture - Blood (collected 09-30-23 @ 19:10)  Source: .Blood Blood-Peripheral  Gram Stain (10-01-23 @ 11:02):    Growth in anaerobic bottle: Gram Positive Cocci in Clusters    Growth in aerobic bottle: Gram Positive Cocci in Clusters  Preliminary Report (10-01-23 @ 11:02):    Growth in anaerobic bottle: Gram Positive Cocci in Clusters    Growth in aerobic bottle: Gram Positive Cocci in Clusters    Direct identification is available within approximately 3-5    hours either by Blood Panel Multiplexed PCR or Direct    MALDI-TOF. Details: [email or web address]  Organism: Blood Culture PCR (10-01-23 @ 11:32)  Organism: Blood Culture PCR (10-01-23 @ 11:32)      Method Type: PCR      -  Methicillin resistant Staphylococcus aureus (MRSA): Detec      Rapid RVP Result: NotDetec (10-01-23 @ 15:45)       < from: TTE Echo Complete w/o Contrast w/ Doppler (10.01.23 @ 10:26) >    Summary:   1. 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 Biatrial enlargement   4. Left ventricular ejection fraction, by visual estimation, is 45 to   50%.   5. Mild mitral annular calcification.   6. Mild-moderate tricuspid regurgitation.   7. Mild aortic valve stenosis.    < end of copied text >      VITALS:  T(C): 36.7 (10-02-23 @ 03:00), Max: 37.1 (10-01-23 @ 23:00)  T(F): 98 (10-02-23 @ 03:00), Max: 98.7 (10-01-23 @ 23:00)  HR: 91 (10-02-23 @ 07:30) (68 - 102)  BP: 105/56 (10-02-23 @ 07:30) (85/52 - 135/82)  BP(mean): 72 (10-02-23 @ 07:30) (58 - 97)  ABP: --  ABP(mean): --  RR: 15 (10-02-23 @ 07:30) (11 - 24)  SpO2: 97% (10-02-23 @ 07:30) (92% - 100%)  CVP(mm Hg): --  CVP(cm H2O): --    Ins and Outs     10-01-23 @ 07:01  -  10-02-23 @ 07:00  --------------------------------------------------------  IN: 933.3 mL / OUT: 0 mL / NET: 933.3 mL        Height (cm): 172.7 (09-30-23 @ 19:00)  Weight (kg): 72.6 (09-30-23 @ 19:36)  BMI (kg/m2): 24.3 (09-30-23 @ 19:36)        I&O's Detail    01 Oct 2023 07:01  -  02 Oct 2023 07:00  --------------------------------------------------------  IN:    Albumin 5% - 50 mL: 150 mL    IV PiggyBack: 50 mL    IV PiggyBack: 250 mL    IV PiggyBack: 100 mL    IV PiggyBack: 100 mL    Norepinephrine: 283.3 mL  Total IN: 933.3 mL    OUT:  Total OUT: 0 mL    Total NET: 933.3 mL

## 2023-10-02 NOTE — BRIEF OPERATIVE NOTE - NSICDXBRIEFPREOP_GEN_ALL_CORE_FT
PRE-OP DIAGNOSIS:  Bacteremia associated with intravascular line 02-Oct-2023 16:41:19  Liam Obregon

## 2023-10-02 NOTE — CONSULT NOTE ADULT - ASSESSMENT
A/P 77 yo M pmhx ESRD came to ED complaining of fatigue from [facility] . Patient  also febrile to 102.2 in ED with complains of cough and wheezing.  Noted to be hypotensive to 80's systolic.  Admitted to ICU     today , no cp, sob, palp, n/v  Sl confused but more oriented as per ccu team       Assessment  Septic shock, suspect line sepsis  AMS- metabolic encephelopathy  Thrombocytopenia -possibly from sepsis     * Underlying h/o CAD s/p PCI, BPH, ESRD on HD, s/p MVR, Afib    Plan  per ccu - mental status improving, suspect metabolic encephelopathy   ? need for CT head - per ccu , MS improving   Heme /Onc - eval for thrombocytopenia -  check  B 12/folate, retic   On low dose pressors  Per ID -  narrow  antibiotics to Vanco  Surgery consult -  to remove perm cath    ID, Renal, Heme to f/u        Palliative :    asked for Goc assist in 77 yo w/ ams /encephalopathic likely from infectious process.  Case d/w ccu team this AM, and I reviewed chart . Pt here from [facility] / . Had been in [facility] prior.   Met w/ pt , he was initially drowsy, became more awake w/ stim.  He is oriented 2-3 , knows self, knew why he was here , unsure from where. Unable to say dates, year or month  , so still has some confusion.   Was aware he will be going for surgery to remove/replace catheter for HD.    Defers to his Girlfriend Surekha - says she is his HCP.      I called Surekha, no answer, left VM asking for return call .    Noted pt has Molst in his Chart from  saying  full code , done in August 2023 at .       Will plan to speak w/ Surekha , and follow pt w/ ccu team . MS seems to be slowly improving.                 A/P 77 yo M pmhx ESRD came to ED complaining of fatigue from [facility] . Patient  also febrile to 102.2 in ED with complains of cough and wheezing.  Noted to be hypotensive to 80's systolic.  Admitted to ICU     today , no cp, sob, palp, n/v  Sl confused but more oriented as per ccu team       Assessment  Septic shock, suspect line sepsis  AMS- metabolic encephelopathy  Thrombocytopenia -possibly from sepsis     * Underlying h/o CAD s/p PCI, BPH, ESRD on HD, s/p MVR, Afib    Plan  per ccu - mental status improving, suspect metabolic encephelopathy   ? need for CT head - per ccu , MS improving   Heme /Onc - eval for thrombocytopenia -  check  B 12/folate, retic   On low dose pressors  Per ID -  narrow  antibiotics to Vanco  Surgery consult -  to remove perm cath    ID, Renal, Heme to f/u        Palliative :    asked for Goc assist in 77 yo w/ ams /encephalopathic likely from infectious process.  Case d/w ccu team this AM, and I reviewed chart . Pt here from [facility] / . Had been in [facility] prior.   Met w/ pt , he was initially drowsy, became more awake w/ stim.  He is oriented 2-3 , knows self, knew why he was here , unsure from where. Unable to say dates, year or month  , so still has some confusion.   Was aware he will be going for surgery to remove/replace catheter for HD.    Defers to his Girlfriend Surekha - says she is his HCP.      I called Surekha, no answer, left VM asking for return call .    Noted pt has Molst in his Chart from  saying  full code , done in August 2023 at .       Will plan to speak w/ Surekha , and follow pt w/ ccu team . MS seems to be slowly improving.                 A/P 79 yo M pmhx ESRD came to ED complaining of fatigue from [facility] . Patient  also febrile to 102.2 in ED with complains of cough and wheezing.  Noted to be hypotensive to 80's systolic.  Admitted to ICU     today , no cp, sob, palp, n/v  Sl confused but more oriented as per ccu team       Assessment  Septic shock, suspect line sepsis  AMS- metabolic encephelopathy  Thrombocytopenia -possibly from sepsis     * Underlying h/o CAD s/p PCI, BPH, ESRD on HD, s/p MVR, Afib    Plan  per ccu - mental status improving, suspect metabolic encephelopathy   ? need for CT head - per ccu , MS improving   Heme /Onc - eval for thrombocytopenia -  check  B 12/folate, retic   On low dose pressors  Per ID -  narrow  antibiotics to Vanco  Surgery consult -  to remove perm cath    ID, Renal, Heme to f/u        Palliative :    asked for Goc assist in 79 yo w/ ams /encephalopathic likely from infectious process.  Case d/w ccu team this AM, and I reviewed chart . Pt here from [facility] / . Had been in [facility] prior.   Met w/ pt , he was initially drowsy, became more awake w/ stim.  He is oriented 2-3 , knows self, knew why he was here , unsure from where. Unable to say dates, year or month  , so still has some confusion.   Was aware he will be going for surgery to remove/replace catheter for HD.    Defers to his Girlfriend Surekha - says she is his HCP.      I called Surekha, no answer, left VM asking for return call .    Noted pt has Molst in his Chart from  saying  full code , done in August 2023 at .       Will plan to speak w/ Surekha , and follow pt w/ ccu team . MS seems to be slowly improving.

## 2023-10-03 ENCOUNTER — APPOINTMENT (OUTPATIENT)
Dept: OPHTHALMOLOGY | Facility: CLINIC | Age: 78
End: 2023-10-03

## 2023-10-03 LAB
-  AMPICILLIN/SULBACTAM: SIGNIFICANT CHANGE UP
-  CEFAZOLIN: SIGNIFICANT CHANGE UP
-  CLINDAMYCIN: SIGNIFICANT CHANGE UP
-  DAPTOMYCIN: SIGNIFICANT CHANGE UP
-  ERYTHROMYCIN: SIGNIFICANT CHANGE UP
-  GENTAMICIN: SIGNIFICANT CHANGE UP
-  LINEZOLID: SIGNIFICANT CHANGE UP
-  OXACILLIN: SIGNIFICANT CHANGE UP
-  PENICILLIN: SIGNIFICANT CHANGE UP
-  RIFAMPIN: SIGNIFICANT CHANGE UP
-  TETRACYCLINE: SIGNIFICANT CHANGE UP
-  TRIMETHOPRIM/SULFAMETHOXAZOLE: SIGNIFICANT CHANGE UP
-  VANCOMYCIN: SIGNIFICANT CHANGE UP
ALBUMIN SERPL ELPH-MCNC: 2.3 G/DL — LOW (ref 3.3–5)
ALBUMIN SERPL ELPH-MCNC: 2.5 G/DL — LOW (ref 3.3–5)
ALP SERPL-CCNC: 369 U/L — HIGH (ref 40–120)
ALP SERPL-CCNC: 397 U/L — HIGH (ref 40–120)
ALT FLD-CCNC: 78 U/L — HIGH (ref 10–45)
ALT FLD-CCNC: 79 U/L — HIGH (ref 10–45)
ANION GAP SERPL CALC-SCNC: 11 MMOL/L — SIGNIFICANT CHANGE UP (ref 5–17)
ANION GAP SERPL CALC-SCNC: 14 MMOL/L — SIGNIFICANT CHANGE UP (ref 5–17)
AST SERPL-CCNC: 86 U/L — HIGH (ref 10–40)
AST SERPL-CCNC: 98 U/L — HIGH (ref 10–40)
BASOPHILS # BLD AUTO: 0.05 K/UL — SIGNIFICANT CHANGE UP (ref 0–0.2)
BASOPHILS NFR BLD AUTO: 0.5 % — SIGNIFICANT CHANGE UP (ref 0–2)
BILIRUB SERPL-MCNC: 0.6 MG/DL — SIGNIFICANT CHANGE UP (ref 0.2–1.2)
BILIRUB SERPL-MCNC: 0.7 MG/DL — SIGNIFICANT CHANGE UP (ref 0.2–1.2)
BUN SERPL-MCNC: 84 MG/DL — HIGH (ref 7–23)
BUN SERPL-MCNC: 90 MG/DL — HIGH (ref 7–23)
CALCIUM SERPL-MCNC: 9.1 MG/DL — SIGNIFICANT CHANGE UP (ref 8.4–10.5)
CALCIUM SERPL-MCNC: 9.3 MG/DL — SIGNIFICANT CHANGE UP (ref 8.4–10.5)
CHLORIDE SERPL-SCNC: 96 MMOL/L — SIGNIFICANT CHANGE UP (ref 96–108)
CHLORIDE SERPL-SCNC: 98 MMOL/L — SIGNIFICANT CHANGE UP (ref 96–108)
CO2 SERPL-SCNC: 20 MMOL/L — LOW (ref 22–31)
CO2 SERPL-SCNC: 21 MMOL/L — LOW (ref 22–31)
CREAT SERPL-MCNC: 5.09 MG/DL — HIGH (ref 0.5–1.3)
CREAT SERPL-MCNC: 5.3 MG/DL — HIGH (ref 0.5–1.3)
CULTURE RESULTS: SIGNIFICANT CHANGE UP
EGFR: 10 ML/MIN/1.73M2 — LOW
EGFR: 11 ML/MIN/1.73M2 — LOW
EOSINOPHIL # BLD AUTO: 0.18 K/UL — SIGNIFICANT CHANGE UP (ref 0–0.5)
EOSINOPHIL NFR BLD AUTO: 2 % — SIGNIFICANT CHANGE UP (ref 0–6)
FOLATE RBC-MCNC: 2467 NG/ML — HIGH (ref 499–1504)
GLUCOSE SERPL-MCNC: 91 MG/DL — SIGNIFICANT CHANGE UP (ref 70–99)
GLUCOSE SERPL-MCNC: 99 MG/DL — SIGNIFICANT CHANGE UP (ref 70–99)
HBV CORE AB SER-ACNC: SIGNIFICANT CHANGE UP
HBV SURFACE AB SER-ACNC: <3 MIU/ML — LOW
HBV SURFACE AG SER-ACNC: SIGNIFICANT CHANGE UP
HCT VFR BLD CALC: 24.2 % — LOW (ref 39–50)
HCV AB S/CO SERPL IA: 0.12 S/CO — SIGNIFICANT CHANGE UP (ref 0–0.99)
HCV AB SERPL-IMP: SIGNIFICANT CHANGE UP
HGB BLD-MCNC: 8.2 G/DL — LOW (ref 13–17)
IMM GRANULOCYTES NFR BLD AUTO: 1.1 % — HIGH (ref 0–0.9)
LYMPHOCYTES # BLD AUTO: 0.52 K/UL — LOW (ref 1–3.3)
LYMPHOCYTES # BLD AUTO: 5.7 % — LOW (ref 13–44)
MAGNESIUM SERPL-MCNC: 2.2 MG/DL — SIGNIFICANT CHANGE UP (ref 1.6–2.6)
MCHC RBC-ENTMCNC: 30.7 PG — SIGNIFICANT CHANGE UP (ref 27–34)
MCHC RBC-ENTMCNC: 33.9 GM/DL — SIGNIFICANT CHANGE UP (ref 32–36)
MCV RBC AUTO: 90.6 FL — SIGNIFICANT CHANGE UP (ref 80–100)
METHOD TYPE: SIGNIFICANT CHANGE UP
MONOCYTES # BLD AUTO: 0.84 K/UL — SIGNIFICANT CHANGE UP (ref 0–0.9)
MONOCYTES NFR BLD AUTO: 9.2 % — SIGNIFICANT CHANGE UP (ref 2–14)
NEUTROPHILS # BLD AUTO: 7.42 K/UL — HIGH (ref 1.8–7.4)
NEUTROPHILS NFR BLD AUTO: 81.5 % — HIGH (ref 43–77)
NRBC # BLD: 0 /100 WBCS — SIGNIFICANT CHANGE UP (ref 0–0)
ORGANISM # SPEC MICROSCOPIC CNT: SIGNIFICANT CHANGE UP
PHOSPHATE SERPL-MCNC: 2.3 MG/DL — LOW (ref 2.5–4.5)
PLATELET # BLD AUTO: 61 K/UL — LOW (ref 150–400)
POTASSIUM SERPL-MCNC: 3.9 MMOL/L — SIGNIFICANT CHANGE UP (ref 3.5–5.3)
POTASSIUM SERPL-MCNC: 4 MMOL/L — SIGNIFICANT CHANGE UP (ref 3.5–5.3)
POTASSIUM SERPL-SCNC: 3.9 MMOL/L — SIGNIFICANT CHANGE UP (ref 3.5–5.3)
POTASSIUM SERPL-SCNC: 4 MMOL/L — SIGNIFICANT CHANGE UP (ref 3.5–5.3)
PROT SERPL-MCNC: 5.9 G/DL — LOW (ref 6–8.3)
PROT SERPL-MCNC: 6 G/DL — SIGNIFICANT CHANGE UP (ref 6–8.3)
RBC # BLD: 2.67 M/UL — LOW (ref 4.2–5.8)
RBC # FLD: 15.7 % — HIGH (ref 10.3–14.5)
RETICS #: 24.6 K/UL — LOW (ref 25–125)
RETICS/RBC NFR: 0.9 % — SIGNIFICANT CHANGE UP (ref 0.5–2.5)
SODIUM SERPL-SCNC: 130 MMOL/L — LOW (ref 135–145)
SPECIMEN SOURCE: SIGNIFICANT CHANGE UP
VANCOMYCIN TROUGH SERPL-MCNC: 23 UG/ML — HIGH (ref 10–20)
VIT B12 SERPL-MCNC: 736 PG/ML — SIGNIFICANT CHANGE UP (ref 232–1245)
WBC # BLD: 9.11 K/UL — SIGNIFICANT CHANGE UP (ref 3.8–10.5)
WBC # FLD AUTO: 9.11 K/UL — SIGNIFICANT CHANGE UP (ref 3.8–10.5)

## 2023-10-03 PROCEDURE — 99222 1ST HOSP IP/OBS MODERATE 55: CPT

## 2023-10-03 PROCEDURE — 99232 SBSQ HOSP IP/OBS MODERATE 35: CPT

## 2023-10-03 PROCEDURE — 93010 ELECTROCARDIOGRAM REPORT: CPT

## 2023-10-03 RX ORDER — MUPIROCIN 20 MG/G
1 OINTMENT TOPICAL
Refills: 0 | Status: COMPLETED | OUTPATIENT
Start: 2023-10-03 | End: 2023-10-08

## 2023-10-03 RX ORDER — ALBUMIN HUMAN 25 %
50 VIAL (ML) INTRAVENOUS EVERY 6 HOURS
Refills: 0 | Status: DISCONTINUED | OUTPATIENT
Start: 2023-10-03 | End: 2023-10-03

## 2023-10-03 RX ORDER — ALBUMIN HUMAN 25 %
50 VIAL (ML) INTRAVENOUS ONCE
Refills: 0 | Status: COMPLETED | OUTPATIENT
Start: 2023-10-03 | End: 2023-10-03

## 2023-10-03 RX ADMIN — MIDODRINE HYDROCHLORIDE 10 MILLIGRAM(S): 2.5 TABLET ORAL at 05:45

## 2023-10-03 RX ADMIN — Medication 137 MICROGRAM(S): at 05:46

## 2023-10-03 RX ADMIN — POLYETHYLENE GLYCOL 3350 17 GRAM(S): 17 POWDER, FOR SOLUTION ORAL at 11:25

## 2023-10-03 RX ADMIN — MIDODRINE HYDROCHLORIDE 10 MILLIGRAM(S): 2.5 TABLET ORAL at 13:20

## 2023-10-03 RX ADMIN — MIDODRINE HYDROCHLORIDE 10 MILLIGRAM(S): 2.5 TABLET ORAL at 21:20

## 2023-10-03 RX ADMIN — Medication 1000 MILLIGRAM(S): at 21:23

## 2023-10-03 RX ADMIN — Medication 1 SUPPOSITORY(S): at 17:58

## 2023-10-03 RX ADMIN — PANTOPRAZOLE SODIUM 40 MILLIGRAM(S): 20 TABLET, DELAYED RELEASE ORAL at 06:27

## 2023-10-03 RX ADMIN — Medication 50 MILLILITER(S): at 17:55

## 2023-10-03 RX ADMIN — Medication 1 MILLIGRAM(S): at 11:25

## 2023-10-03 RX ADMIN — Medication 81 MILLIGRAM(S): at 11:25

## 2023-10-03 RX ADMIN — Medication 1 SUPPOSITORY(S): at 05:45

## 2023-10-03 RX ADMIN — MUPIROCIN 1 APPLICATION(S): 20 OINTMENT TOPICAL at 17:56

## 2023-10-03 NOTE — PROGRESS NOTE ADULT - SUBJECTIVE AND OBJECTIVE BOX
Seen in ICU, on NC O2    Vital Signs Last 24 Hrs  T(C): 36.7 (10-03-23 @ 11:00), Max: 36.7 (10-02-23 @ 20:00)  T(F): 98 (10-03-23 @ 11:00), Max: 98.1 (10-02-23 @ 20:00)  HR: 95 (10-03-23 @ 13:30) (81 - 109)  BP: 115/79 (10-03-23 @ 13:30) (80/48 - 116/56)  BP(mean): 91 (10-03-23 @ 13:30) (58 - 91)  RR: 14 (10-03-23 @ 13:30) (10 - 25)  SpO2: 98% (10-03-23 @ 13:30) (96% - 100%)    I&O's Detail    02 Oct 2023 07:01  -  03 Oct 2023 07:00  --------------------------------------------------------  IN:    Albumin 5% - 50 mL: 50 mL    IV PiggyBack: 250 mL    IV PiggyBack: 50 mL    Norepinephrine: 2.7 mL    Norepinephrine: 12.2 mL  Total IN: 364.9 mL    OUT:  Total OUT: 0 mL    Total NET: 364.9 mL    s1s2  b/l air entry  soft, ND  sm edema, LUE AVF + bruit                                  8.2    9.11  )-----------( 61       ( 03 Oct 2023 06:32 )             24.2     03 Oct 2023 15:51    130    |  96     |  90     ----------------------------<  99     3.9     |  20     |  5.30     Ca    9.3        03 Oct 2023 15:51  Phos  2.3       03 Oct 2023 06:32  Mg     2.2       03 Oct 2023 06:32    TPro  6.0    /  Alb  2.3    /  TBili  0.6    /  DBili  x      /  AST  98     /  ALT  78     /  AlkPhos  369    03 Oct 2023 06:32    LIVER FUNCTIONS - ( 03 Oct 2023 06:32 )  Alb: 2.3 g/dL / Pro: 6.0 g/dL / ALK PHOS: 369 U/L / ALT: 78 U/L / AST: 98 U/L / GGT: x           Culture - Blood (collected 30 Sep 2023 19:15)  Source: .Blood Blood-Peripheral  Gram Stain (01 Oct 2023 11:24):    Growth in anaerobic bottle: Gram Positive Cocci in Clusters    Growth in aerobic bottle: Gram Positive Cocci in Clusters  Final Report (03 Oct 2023 07:40):    Growth in aerobic and anaerobic bottles: Methicillin Resistant    Staphylococcus aureus    See previous culture  [id number]    Culture - Blood (collected 30 Sep 2023 19:10)  Source: .Blood Blood-Peripheral  Gram Stain (01 Oct 2023 11:02):    Growth in anaerobic bottle: Gram Positive Cocci in Clusters    Growth in aerobic bottle: Gram Positive Cocci in Clusters  Final Report (03 Oct 2023 07:39):    Growth in aerobic and anaerobic bottles: Methicillin Resistant    Staphylococcus aureus    Direct identification is available within approximately 3-5    hours either by Blood Panel Multiplexed PCR or Direct    MALDI-TOF. Details: [email or web address]  Organism: Blood Culture PCR  Methicillin resistant Staphylococcus aureus (03 Oct 2023 07:39)  Organism: Methicillin resistant Staphylococcus aureus (03 Oct 2023 07:39)  Organism: Blood Culture PCR (03 Oct 2023 07:39)    acetaminophen     Tablet .. 650 milliGRAM(s) Oral every 6 hours PRN  albumin human 25% IVPB 50 milliLiter(s) IV Intermittent once  aluminum hydroxide/magnesium hydroxide/simethicone Suspension 10 milliLiter(s) Oral every 6 hours PRN  aspirin  chewable 81 milliGRAM(s) Oral daily  folic acid 1 milliGRAM(s) Oral daily  hydrocortisone hemorrhoidal Suppository 1 Suppository(s) Rectal two times a day  influenza  Vaccine (HIGH DOSE) 0.7 milliLiter(s) IntraMuscular once  levothyroxine 137 MICROGram(s) Oral daily  mesalamine Suppository 1000 milliGRAM(s) Rectal at bedtime  midodrine 10 milliGRAM(s) Oral every 8 hours  mupirocin 2% Nasal 1 Application(s) Both Nostrils two times a day  norepinephrine Infusion 0.05 MICROgram(s)/kG/Min IV Continuous <Continuous>  pantoprazole    Tablet 40 milliGRAM(s) Oral before breakfast  polyethylene glycol 3350 17 Gram(s) Oral daily  senna 2 Tablet(s) Oral at bedtime    A/P:    ESRD on HD MWF via perm cath, Afib, CM, EF 45 - 50%  Adm w/MRSA bacteremia  Pls dose Vanco by levels  Perm cath d/c-d yesterday  BP support as needed per ICU   Will f/u BMP, CBC  Per d/w ICU team, pt will have temp HD cath placed tomorrow and have next HD tomorrow  Will follow     [phone] Seen in ICU, on NC O2    Vital Signs Last 24 Hrs  T(C): 36.7 (10-03-23 @ 11:00), Max: 36.7 (10-02-23 @ 20:00)  T(F): 98 (10-03-23 @ 11:00), Max: 98.1 (10-02-23 @ 20:00)  HR: 95 (10-03-23 @ 13:30) (81 - 109)  BP: 115/79 (10-03-23 @ 13:30) (80/48 - 116/56)  BP(mean): 91 (10-03-23 @ 13:30) (58 - 91)  RR: 14 (10-03-23 @ 13:30) (10 - 25)  SpO2: 98% (10-03-23 @ 13:30) (96% - 100%)    I&O's Detail    02 Oct 2023 07:01  -  03 Oct 2023 07:00  --------------------------------------------------------  IN:    Albumin 5% - 50 mL: 50 mL    IV PiggyBack: 250 mL    IV PiggyBack: 50 mL    Norepinephrine: 2.7 mL    Norepinephrine: 12.2 mL  Total IN: 364.9 mL    OUT:  Total OUT: 0 mL    Total NET: 364.9 mL    s1s2  b/l air entry  soft, ND  sm edema, LUE AVF + bruit                                  8.2    9.11  )-----------( 61       ( 03 Oct 2023 06:32 )             24.2     03 Oct 2023 15:51    130    |  96     |  90     ----------------------------<  99     3.9     |  20     |  5.30     Ca    9.3        03 Oct 2023 15:51  Phos  2.3       03 Oct 2023 06:32  Mg     2.2       03 Oct 2023 06:32    TPro  6.0    /  Alb  2.3    /  TBili  0.6    /  DBili  x      /  AST  98     /  ALT  78     /  AlkPhos  369    03 Oct 2023 06:32    LIVER FUNCTIONS - ( 03 Oct 2023 06:32 )  Alb: 2.3 g/dL / Pro: 6.0 g/dL / ALK PHOS: 369 U/L / ALT: 78 U/L / AST: 98 U/L / GGT: x           Culture - Blood (collected 30 Sep 2023 19:15)  Source: .Blood Blood-Peripheral  Gram Stain (01 Oct 2023 11:24):    Growth in anaerobic bottle: Gram Positive Cocci in Clusters    Growth in aerobic bottle: Gram Positive Cocci in Clusters  Final Report (03 Oct 2023 07:40):    Growth in aerobic and anaerobic bottles: Methicillin Resistant    Staphylococcus aureus    See previous culture  [id number]    Culture - Blood (collected 30 Sep 2023 19:10)  Source: .Blood Blood-Peripheral  Gram Stain (01 Oct 2023 11:02):    Growth in anaerobic bottle: Gram Positive Cocci in Clusters    Growth in aerobic bottle: Gram Positive Cocci in Clusters  Final Report (03 Oct 2023 07:39):    Growth in aerobic and anaerobic bottles: Methicillin Resistant    Staphylococcus aureus    Direct identification is available within approximately 3-5    hours either by Blood Panel Multiplexed PCR or Direct    MALDI-TOF. Details: [email or web address]  Organism: Blood Culture PCR  Methicillin resistant Staphylococcus aureus (03 Oct 2023 07:39)  Organism: Methicillin resistant Staphylococcus aureus (03 Oct 2023 07:39)  Organism: Blood Culture PCR (03 Oct 2023 07:39)    acetaminophen     Tablet .. 650 milliGRAM(s) Oral every 6 hours PRN  albumin human 25% IVPB 50 milliLiter(s) IV Intermittent once  aluminum hydroxide/magnesium hydroxide/simethicone Suspension 10 milliLiter(s) Oral every 6 hours PRN  aspirin  chewable 81 milliGRAM(s) Oral daily  folic acid 1 milliGRAM(s) Oral daily  hydrocortisone hemorrhoidal Suppository 1 Suppository(s) Rectal two times a day  influenza  Vaccine (HIGH DOSE) 0.7 milliLiter(s) IntraMuscular once  levothyroxine 137 MICROGram(s) Oral daily  mesalamine Suppository 1000 milliGRAM(s) Rectal at bedtime  midodrine 10 milliGRAM(s) Oral every 8 hours  mupirocin 2% Nasal 1 Application(s) Both Nostrils two times a day  norepinephrine Infusion 0.05 MICROgram(s)/kG/Min IV Continuous <Continuous>  pantoprazole    Tablet 40 milliGRAM(s) Oral before breakfast  polyethylene glycol 3350 17 Gram(s) Oral daily  senna 2 Tablet(s) Oral at bedtime    A/P:    ESRD on HD MWF via perm cath, Afib, CM, EF 45 - 50%  Adm w/MRSA bacteremia  Pls dose Vanco by levels  Perm cath d/c-d yesterday  BP support as needed per ICU   Will f/u BMP, CBC  Per d/w ICU team, pt will have temp HD cath placed tomorrow and have next HD tomorrow  Will follow     [phone] Seen in ICU, on NC O2    Vital Signs Last 24 Hrs  T(C): 36.7 (10-03-23 @ 11:00), Max: 36.7 (10-02-23 @ 20:00)  T(F): 98 (10-03-23 @ 11:00), Max: 98.1 (10-02-23 @ 20:00)  HR: 95 (10-03-23 @ 13:30) (81 - 109)  BP: 115/79 (10-03-23 @ 13:30) (80/48 - 116/56)  BP(mean): 91 (10-03-23 @ 13:30) (58 - 91)  RR: 14 (10-03-23 @ 13:30) (10 - 25)  SpO2: 98% (10-03-23 @ 13:30) (96% - 100%)    I&O's Detail    02 Oct 2023 07:01  -  03 Oct 2023 07:00  --------------------------------------------------------  IN:    Albumin 5% - 50 mL: 50 mL    IV PiggyBack: 250 mL    IV PiggyBack: 50 mL    Norepinephrine: 2.7 mL    Norepinephrine: 12.2 mL  Total IN: 364.9 mL    OUT:  Total OUT: 0 mL    Total NET: 364.9 mL    s1s2  b/l air entry  soft, ND  sm edema, LUE AVF + bruit                                  8.2    9.11  )-----------( 61       ( 03 Oct 2023 06:32 )             24.2     03 Oct 2023 15:51    130    |  96     |  90     ----------------------------<  99     3.9     |  20     |  5.30     Ca    9.3        03 Oct 2023 15:51  Phos  2.3       03 Oct 2023 06:32  Mg     2.2       03 Oct 2023 06:32    TPro  6.0    /  Alb  2.3    /  TBili  0.6    /  DBili  x      /  AST  98     /  ALT  78     /  AlkPhos  369    03 Oct 2023 06:32    LIVER FUNCTIONS - ( 03 Oct 2023 06:32 )  Alb: 2.3 g/dL / Pro: 6.0 g/dL / ALK PHOS: 369 U/L / ALT: 78 U/L / AST: 98 U/L / GGT: x           Culture - Blood (collected 30 Sep 2023 19:15)  Source: .Blood Blood-Peripheral  Gram Stain (01 Oct 2023 11:24):    Growth in anaerobic bottle: Gram Positive Cocci in Clusters    Growth in aerobic bottle: Gram Positive Cocci in Clusters  Final Report (03 Oct 2023 07:40):    Growth in aerobic and anaerobic bottles: Methicillin Resistant    Staphylococcus aureus    See previous culture  [id number]    Culture - Blood (collected 30 Sep 2023 19:10)  Source: .Blood Blood-Peripheral  Gram Stain (01 Oct 2023 11:02):    Growth in anaerobic bottle: Gram Positive Cocci in Clusters    Growth in aerobic bottle: Gram Positive Cocci in Clusters  Final Report (03 Oct 2023 07:39):    Growth in aerobic and anaerobic bottles: Methicillin Resistant    Staphylococcus aureus    Direct identification is available within approximately 3-5    hours either by Blood Panel Multiplexed PCR or Direct    MALDI-TOF. Details: [email or web address]  Organism: Blood Culture PCR  Methicillin resistant Staphylococcus aureus (03 Oct 2023 07:39)  Organism: Methicillin resistant Staphylococcus aureus (03 Oct 2023 07:39)  Organism: Blood Culture PCR (03 Oct 2023 07:39)    acetaminophen     Tablet .. 650 milliGRAM(s) Oral every 6 hours PRN  albumin human 25% IVPB 50 milliLiter(s) IV Intermittent once  aluminum hydroxide/magnesium hydroxide/simethicone Suspension 10 milliLiter(s) Oral every 6 hours PRN  aspirin  chewable 81 milliGRAM(s) Oral daily  folic acid 1 milliGRAM(s) Oral daily  hydrocortisone hemorrhoidal Suppository 1 Suppository(s) Rectal two times a day  influenza  Vaccine (HIGH DOSE) 0.7 milliLiter(s) IntraMuscular once  levothyroxine 137 MICROGram(s) Oral daily  mesalamine Suppository 1000 milliGRAM(s) Rectal at bedtime  midodrine 10 milliGRAM(s) Oral every 8 hours  mupirocin 2% Nasal 1 Application(s) Both Nostrils two times a day  norepinephrine Infusion 0.05 MICROgram(s)/kG/Min IV Continuous <Continuous>  pantoprazole    Tablet 40 milliGRAM(s) Oral before breakfast  polyethylene glycol 3350 17 Gram(s) Oral daily  senna 2 Tablet(s) Oral at bedtime    A/P:    ESRD on HD MWF via perm cath, Afib, CM, EF 45 - 50%  Adm w/MRSA bacteremia  Pls dose Vanco by levels  Perm cath d/c-d yesterday  BP support as needed per ICU   Will f/u BMP, CBC  Per d/w ICU team, pt will have temp HD cath placed tomorrow and have next HD tomorrow  Will follow     [phone]

## 2023-10-03 NOTE — PROGRESS NOTE ADULT - ASSESSMENT
Physical Examination:  GENERAL:               Alert, oridented x 2, No acute distress.    HEENT:                   No JVD, Moist MM  PULM:                     Bilateral air entry, Clear to auscultation bilaterally, no significant sputum production, trace rales, No Rhonchi, No Wheezing  CVS:                         S1, S2,  + Murmur  ABD:                        Soft, nondistended, nontender, normoactive bowel sounds,   EXT:                         no edema, nontender, No Cyanosis or Clubbing   Vascular:                Warm Extremities, Normal Capillary refill, Normal Distal Pulses  NEURO:                 Alert, interactive nonfocal, follows commands  PSYC:                      Calm, Limited Insight.        Assessment  Septic shock, suspect line sepsis  AMS- metabolic encephelopathy  Thrombocytopenia, suspect from Sepsis  Underlying h/o CAD s/p PCI, BPH, ESRD on HD, s/p MVR, Afib      Plan  As mental status improving, suspect metabolic encephelopathy   Repeat blood cultures in am   On low dose pressors, taper to maintain map > 60  antibiotics as per ID  Perm cath removed 10/3/2023   f/u cultures   hold a/c due to low BP  ID, Renal, Heme f/u  med rec done.     PMD:				                   Notified(Date):  Family Updated: 	Surekha BLISS  [phone], C  [phone](perfered line)	                                 Date: 10/2/2023 updated bedside 10/3/2023-msg left for call back       Sedation & Analgesia:	n/a  Diet/Nutrition:		Diet, Renal Restrictions: For patients receiving Renal Replacement - No Protein Restr, No Conc K, No Conc Phos, Low Sodium (09-30-23 @ 23:51) [Active]  GI PPx:			Protonix  DVT Ppx:		Low plts, suspect will go lower, Venodynes    	       Activity:		    Head of Bed:               35-45 Deg  Glycemic Control:        n/a    Lines:  PIV  CENTRAL LINE: 	[ ] YES [ ] NO	        //Perm cath present on admission removed in or 10/2/2023            LOCATION:   	                       DATE INSERTED:   	                    REMOVE:  [ ] YES [  ] NO    A-LINE:  	                [ ] YES [ ] NO                      LOCATION:   	                       DATE INSERTED: 		            REMOVE:  [ ] YES [ ] NO    SU: 		        [ ] YES [ ] NO  		                                       DATE INSERTED:		            REMOVE:  [ ] YES [ ] NO      Restraints may deemed necessary to prevent removal of life-sustaining devices [ ] YES   [  x  ]  NO    Disposition:  ICU Care  Goals of Care: JULIANN from 8/30/23 Full code Physical Examination:  GENERAL:               Alert, oridented x 2, No acute distress.    HEENT:                   No JVD, Moist MM  PULM:                     Bilateral air entry, Clear to auscultation bilaterally, no significant sputum production, trace rales, No Rhonchi, No Wheezing  CVS:                         S1, S2,  + Murmur  ABD:                        Soft, nondistended, nontender, normoactive bowel sounds,   EXT:                         no edema, nontender, No Cyanosis or Clubbing   Vascular:                Warm Extremities, Normal Capillary refill, Normal Distal Pulses  NEURO:                 Alert, interactive nonfocal, follows commands  PSYC:                      Calm, Limited Insight.        Assessment  Septic shock, suspect line sepsis  AMS- metabolic encephelopathy  Thrombocytopenia, suspect from Sepsis  Underlying h/o CAD s/p PCI, BPH, ESRD on HD, s/p MVR, Afib      Plan  As mental status improving, suspect metabolic encephelopathy   Repeat blood cultures in am   On low dose pressors, taper to maintain map > 60  antibiotics as per ID  Perm cath removed 10/3/2023   f/u cultures   hold a/c due to low BP  ID, Renal, Heme f/u  med rec done.     PMD:				                   Notified(Date):  Family Updated: 	Surekha BLISS  [phone], C  [phone](perfered line)	                                 Date: 10/2/2023 updated bedside 10/3/2023-msg left for call back       Sedation & Analgesia:	n/a  Diet/Nutrition:		Diet, Renal Restrictions: For patients receiving Renal Replacement - No Protein Restr, No Conc K, No Conc Phos, Low Sodium (09-30-23 @ 23:51) [Active]  GI PPx:			Protonix  DVT Ppx:		Low plts, suspect will go lower, Venodynes    	       Activity:		    Head of Bed:               35-45 Deg  Glycemic Control:        n/a    Lines:  PIV  CENTRAL LINE: 	[ ] YES [ ] NO	        //Perm cath present on admission removed in or 10/2/2023            LOCATION:   	                       DATE INSERTED:   	                    REMOVE:  [ ] YES [  ] NO    A-LINE:  	                [ ] YES [ ] NO                      LOCATION:   	                       DATE INSERTED: 		            REMOVE:  [ ] YES [ ] NO    SU: 		        [ ] YES [ ] NO  		                                       DATE INSERTED:		            REMOVE:  [ ] YES [ ] NO      Restraints may deemed necessary to prevent removal of life-sustaining devices [ ] YES   [  x  ]  NO    Disposition:  ICU Care  Goals of Care: JULIANN from 8/30/23 Full code Physical Examination:  GENERAL:               Alert, oridented x 2, No acute distress.    HEENT:                   No JVD, Moist MM  PULM:                     Bilateral air entry, Clear to auscultation bilaterally, no significant sputum production, trace rales, No Rhonchi, No Wheezing  CVS:                         S1, S2,  + Murmur  ABD:                        Soft, nondistended, nontender, normoactive bowel sounds,   EXT:                         no edema, nontender, No Cyanosis or Clubbing   Vascular:                Warm Extremities, Normal Capillary refill, Normal Distal Pulses  NEURO:                 Alert, interactive nonfocal, follows commands  PSYC:                      Calm, Limited Insight.        Assessment  Septic shock, suspect line sepsis  AMS- metabolic encephelopathy  Thrombocytopenia, suspect from Sepsis  Underlying h/o CAD s/p PCI, BPH, ESRD on HD, s/p MVR, Afib      Plan  As mental status improving, suspect metabolic encephelopathy   Repeat blood cultures in am   On low dose pressors, taper to maintain map > 60  antibiotics as per ID  Perm cath removed 10/3/2023   f/u cultures   hold a/c due to low BP  ID, Renal, Heme f/u  med rec done.     PMD:				                   Notified(Date):  Family Updated: 	Surekha BLISS  [phone], C  [phone](perfered line)	                                 Date: 10/2/2023 updated bedside 10/3/2023-msg left for call back       Sedation & Analgesia:	n/a  Diet/Nutrition:		Diet, Renal Restrictions: For patients receiving Renal Replacement - No Protein Restr, No Conc K, No Conc Phos, Low Sodium (09-30-23 @ 23:51) [Active]  GI PPx:			Protonix  DVT Ppx:		Low plts, suspect will go lower, Venodynes    	       Activity:		    Head of Bed:               35-45 Deg  Glycemic Control:        n/a    Lines:  PIV  CENTRAL LINE: 	[ ] YES [ ] NO	        //Perm cath present on admission removed in or 10/2/2023            LOCATION:   	                       DATE INSERTED:   	                    REMOVE:  [ ] YES [  ] NO    A-LINE:  	                [ ] YES [ ] NO                      LOCATION:   	                       DATE INSERTED: 		            REMOVE:  [ ] YES [ ] NO    SU: 		        [ ] YES [ ] NO  		                                       DATE INSERTED:		            REMOVE:  [ ] YES [ ] NO      Restraints may deemed necessary to prevent removal of life-sustaining devices [ ] YES   [  x  ]  NO    Disposition:  ICU Care  Goals of Care: JULIANN from 8/30/23 Full code

## 2023-10-03 NOTE — PROGRESS NOTE ADULT - SUBJECTIVE AND OBJECTIVE BOX
CC: f/u for mrsa bacteremia    Patient reports nothing     REVIEW OF SYSTEMS:  All other review of systems negative (Comprehensive ROS)cannot get    Antimicrobials Day #  :    Other Medications Reviewed    T(F): 98 (10-03-23 @ 11:00), Max: 98.1 (10-02-23 @ 20:00)  HR: 95 (10-03-23 @ 13:30)  BP: 115/79 (10-03-23 @ 13:30)  RR: 14 (10-03-23 @ 13:30)  SpO2: 98% (10-03-23 @ 13:30)  Wt(kg): --    PHYSICAL EXAM:  General:  no acute distress  Eyes:  anicteric, no conjunctival injection, no discharge  Oropharynx: no lesions or injection 	  Neck: supple, without adenopathy  Lungs: poor effort  to auscultation  Heart: regular rate and rhythm; no murmur, rubs or gallops  Abdomen: soft, nondistended, nontender, without mass or organomegaly  Skin: no lesions  Extremities: no clubbing, cyanosis. right arm  edema. left leg fused knee  backside no ulcers  Neurologic:somewhat catatonic   LAB RESULTS:                        8.2    9.11  )-----------( 61       ( 03 Oct 2023 06:32 )             24.2     10-03    130<L>  |  98  |  84<H>  ----------------------------<  91  4.0   |  21<L>  |  5.09<H>    Ca    9.1      03 Oct 2023 06:32  Phos  2.3     10-03  Mg     2.2     10-    TPro  6.0  /  Alb  2.3<L>  /  TBili  0.6  /  DBili  x   /  AST  98<H>  /  ALT  78<H>  /  AlkPhos  369<H>  10-03    LIVER FUNCTIONS - ( 03 Oct 2023 06:32 )  Alb: 2.3 g/dL / Pro: 6.0 g/dL / ALK PHOS: 369 U/L / ALT: 78 U/L / AST: 98 U/L / GGT: x           Urinalysis Basic - ( 03 Oct 2023 06:32 )    Color: x / Appearance: x / SG: x / pH: x  Gluc: 91 mg/dL / Ketone: x  / Bili: x / Urobili: x   Blood: x / Protein: x / Nitrite: x   Leuk Esterase: x / RBC: x / WBC x   Sq Epi: x / Non Sq Epi: x / Bacteria: x      MICROBIOLOGY:  RECENT CULTURES:   @ 19:15 .Blood Blood-Peripheral     Growth in aerobic and anaerobic bottles: Methicillin Resistant  Staphylococcus aureus  See previous culture  [id number]    Growth in anaerobic bottle: Gram Positive Cocci in Clusters  Growth in aerobic bottle: Gram Positive Cocci in Clusters     @ 19:10 .Blood Blood-Peripheral Blood Culture PCR  Methicillin resistant Staphylococcus aureus    Growth in aerobic and anaerobic bottles: Methicillin Resistant  Staphylococcus aureus  Direct identification is available within approximately 3-5  hours either by Blood Panel Multiplexed PCR or Direct  MALDI-TOF. Details: [email or web address]    Growth in anaerobic bottle: Gram Positive Cocci in Clusters  Growth in aerobic bottle: Gram Positive Cocci in Clusters        RADIOLOGY REVIEWED:      ACC: [id number] EXAM:  XR CHEST PORTABLE ROUTINE 1V   ORDERED BY: ELMA MONREAL     ACC: [id number] EXAM:  XR CHEST PORTABLE IMMED 1V   ORDERED BY: BRANDIN BURTON     PROCEDURE DATE:  2023          INTERPRETATION:  Chest portable at 7:30 PM on 2023    CLINICAL HISTORY: Sepsis    Comparison:  None available    Cardiomegaly with prior open heart surgery including left atrial   appendage clip and mitral valve replacement. A wireless micropacemaker   versus loop recorder type device is identified. A double lumen dialysis   type catheter is in position.    Lungs reveal coarsened lung markings with areas of linear   atelectasis/patchy infiltrate at the left lower lung and a right-sided   pleural effusion/right mid and lower lung airspace disease. The left   angle is minimally blunted. Bones without acute abnormality.        Chest portable at 8:40 AM on 10/1/2023    CLINICAL HISTORY: Fever/sepsis/follow-up    COMPARISON: Film one day previously    FINDINGS: No change in the heartor mediastinum as above. Right-sided   pleural effusion/airspace disease is minimally improved. No change in the   patchy and linear left lower lobe infiltrates and blunted left angle.    IMPRESSION: Cardiomegaly. Right lower lobe infiltrate/pneumonia and   associated effusion. Patchy left lower lobe areas of   infiltrate/atelectasis. Additional findings as above    --- End of Report ---    ACC: [id number] EXAM:  ECHO TTE WO CON COMP W DOPP                          PROCEDURE DATE:  10/01/2023          INTERPRETATION:  TRANSTHORACIC ECHOCARDIOGRAM REPORT        Patient Name:   JANEE ARCEO Patient Location: [address] Rec #:  [id number] Accession #:      [id number]  Account #:      [id number]   Height:           67.7 in 172.0 cm  YOB: 1945  Weight:           158.7 lb 72.00 kg  Patient Age:    78 years   BSA:              1.85 m²  Patient Gender: M          BP:    97/42 mmHg      Date of Exam:        10/1/2023 10:26:05 AM  Sonographer:         RON  Referring Physician: NANCY    Procedure:     2D Echo/Doppler/Color Doppler Complete.  Indications:   I33.0 - Acute and subacute infective endocarditis  Diagnosis:     I33.0 - Acute and subacute infective endocarditis  Study Details: Technically adequate study. Study quality was adversely   affected                 due to Apical view is limited.        2D AND M-MODE MEASUREMENTS (normal ranges within parentheses):  Left Ventricle:                  Normal   Aorta/Left Atrium:               Normal  IVSd (2D):              1.01 cm (0.7-1.1) Aortic Root (Mmode): 3.93 cm   (2.4-3.7)  LVPWd (2D):             1.19 cm (0.7-1.1) AoV Cusp Separation: 1.70 cm   (1.5-2.6)  LVIDd (2D):             4.68 cm (3.4-5.7) Left Atrium (Mmode): 4.04 cm   (1.9-4.0)  LVIDs (2D):             3.76 cm  LV FS (2D):             19.5 %   (>25%)  LV EF (2D):              40 %    (>55%)  Relative Wall Thickness  0.51    (<0.42)    LV DIASTOLIC FUNCTION:  MV Peak E: 1.15 m/s Decel Time: 424 msec  MV Peak A: 0.12 m/s  E/A Ratio: 9.44    SPECTRAL DOPPLER ANALYSIS (where applicable):  Mitral Valve:  MV P1/2 Time: 123.00 msec  MV Area, PHT: 1.79 cm²    Aortic Valve: AoV Max Inder: 1.45 m/s AoV Peak P.4 mmHg AoV Mean P.5 mmHg    LVOT Vmax: 0.42 m/s LVOT VTI: 0.084 m LVOT Diameter: 2.10 cm    AoV Area, Vmax: 1.00 cm² AoV Area, VTI: 1.37 cm² AoV Area, Vmn: 1.30 cm²  Ao VTI: 0.212  Tricuspid Valve and PA/RV Systolic Pressure: TR Max Velocity: 2.33 m/s RA   Pressure: 10 mmHg RVSP/PASP: 31.6 mmHg      PHYSICIAN INTERPRETATION:  Left Ventricle: The left ventricular internal cavity size is normal.  Global LV systolic function was normal. Left ventricular ejection   fraction, by visual estimation, is 45 to 50%. Abnormal (paradoxical)   septal motion consistent with post-operative status. The left ventricular   diastolic function could not be assessed in this study.  Right Ventricle: The right ventricle was not well visualized.  Left Atrium: Severely enlarged left atrium.  Right Atrium: Moderately enlarged right atrium.  Pericardium: Trivial pericardial effusion is present. The pericardial   effusion is globally located around the entire heart.  Mitral Valve: There ismild mitral annular calcification. MV Prosthetic   Valve.  Tricuspid Valve: The tricuspid valve is normal in structure.   Mild-moderate tricuspid regurgitation is visualized.  Aortic Valve: The aortic valve is trileaflet. Mild aortic stenosis is   present. Trivial aortic valve regurgitation is seen.  Pulmonic Valve: Structurally normal pulmonic valve, with normal leaflet   excursion. The pulmonic valve is normal. Mild pulmonic valve   regurgitation.  Aorta: There is dilatation of the aortic root.      Summary:   1. 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 Biatrial enlargement   4. Left ventricular ejection fraction, by visual estimation, is 45 to   50%.   5. Mild mitral annular calcification.   6. Mild-moderate tricuspid regurgitation.   7. Mild aortic valve stenosis.    [id number] Pilo Lozano , Electronically signed on 10/1/2023   at 11:45:16 AM      Assessment:  patient is a resident at  , h/p mvr, esrd on hd via permacath, came in a few days ago for fever, hypotension, remains encephalopathic, still on pressors. He was found to have high grade mrsa bacteremia. The permacath has been removed. Most likely source.  He could have endocarditis but not clear he would be a valve replacement candidate . TALYA however, would potentially impact management in that we may give synergistic tx if has veg on mv.r  Plan:  continue vanco by level  f/u repeat bc planned for tomorrow am  check vanco level in am, give 1g if level is less than 20  check talya   no permacath until we are sure bacteremia is controlled, planned for temporary cath  CC: f/u for mrsa bacteremia    Patient reports nothing     REVIEW OF SYSTEMS:  All other review of systems negative (Comprehensive ROS)cannot get    Antimicrobials Day #  :    Other Medications Reviewed    T(F): 98 (10-03-23 @ 11:00), Max: 98.1 (10-02-23 @ 20:00)  HR: 95 (10-03-23 @ 13:30)  BP: 115/79 (10-03-23 @ 13:30)  RR: 14 (10-03-23 @ 13:30)  SpO2: 98% (10-03-23 @ 13:30)  Wt(kg): --    PHYSICAL EXAM:  General:  no acute distress  Eyes:  anicteric, no conjunctival injection, no discharge  Oropharynx: no lesions or injection 	  Neck: supple, without adenopathy  Lungs: poor effort  to auscultation  Heart: regular rate and rhythm; no murmur, rubs or gallops  Abdomen: soft, nondistended, nontender, without mass or organomegaly  Skin: no lesions  Extremities: no clubbing, cyanosis. right arm  edema. left leg fused knee  backside no ulcers  Neurologic:somewhat catatonic   LAB RESULTS:                        8.2    9.11  )-----------( 61       ( 03 Oct 2023 06:32 )             24.2     10-03    130<L>  |  98  |  84<H>  ----------------------------<  91  4.0   |  21<L>  |  5.09<H>    Ca    9.1      03 Oct 2023 06:32  Phos  2.3     10-03  Mg     2.2     10-    TPro  6.0  /  Alb  2.3<L>  /  TBili  0.6  /  DBili  x   /  AST  98<H>  /  ALT  78<H>  /  AlkPhos  369<H>  10-03    LIVER FUNCTIONS - ( 03 Oct 2023 06:32 )  Alb: 2.3 g/dL / Pro: 6.0 g/dL / ALK PHOS: 369 U/L / ALT: 78 U/L / AST: 98 U/L / GGT: x           Urinalysis Basic - ( 03 Oct 2023 06:32 )    Color: x / Appearance: x / SG: x / pH: x  Gluc: 91 mg/dL / Ketone: x  / Bili: x / Urobili: x   Blood: x / Protein: x / Nitrite: x   Leuk Esterase: x / RBC: x / WBC x   Sq Epi: x / Non Sq Epi: x / Bacteria: x      MICROBIOLOGY:  RECENT CULTURES:   @ 19:15 .Blood Blood-Peripheral     Growth in aerobic and anaerobic bottles: Methicillin Resistant  Staphylococcus aureus  See previous culture  [id number]    Growth in anaerobic bottle: Gram Positive Cocci in Clusters  Growth in aerobic bottle: Gram Positive Cocci in Clusters     @ 19:10 .Blood Blood-Peripheral Blood Culture PCR  Methicillin resistant Staphylococcus aureus    Growth in aerobic and anaerobic bottles: Methicillin Resistant  Staphylococcus aureus  Direct identification is available within approximately 3-5  hours either by Blood Panel Multiplexed PCR or Direct  MALDI-TOF. Details: [email or web address]    Growth in anaerobic bottle: Gram Positive Cocci in Clusters  Growth in aerobic bottle: Gram Positive Cocci in Clusters        RADIOLOGY REVIEWED:      ACC: [id number] EXAM:  XR CHEST PORTABLE ROUTINE 1V   ORDERED BY: ELMA MONREAL     ACC: [id number] EXAM:  XR CHEST PORTABLE IMMED 1V   ORDERED BY: BRANDIN BURTON     PROCEDURE DATE:  2023          INTERPRETATION:  Chest portable at 7:30 PM on 2023    CLINICAL HISTORY: Sepsis    Comparison:  None available    Cardiomegaly with prior open heart surgery including left atrial   appendage clip and mitral valve replacement. A wireless micropacemaker   versus loop recorder type device is identified. A double lumen dialysis   type catheter is in position.    Lungs reveal coarsened lung markings with areas of linear   atelectasis/patchy infiltrate at the left lower lung and a right-sided   pleural effusion/right mid and lower lung airspace disease. The left   angle is minimally blunted. Bones without acute abnormality.        Chest portable at 8:40 AM on 10/1/2023    CLINICAL HISTORY: Fever/sepsis/follow-up    COMPARISON: Film one day previously    FINDINGS: No change in the heartor mediastinum as above. Right-sided   pleural effusion/airspace disease is minimally improved. No change in the   patchy and linear left lower lobe infiltrates and blunted left angle.    IMPRESSION: Cardiomegaly. Right lower lobe infiltrate/pneumonia and   associated effusion. Patchy left lower lobe areas of   infiltrate/atelectasis. Additional findings as above    --- End of Report ---    ACC: [id number] EXAM:  ECHO TTE WO CON COMP W DOPP                          PROCEDURE DATE:  10/01/2023          INTERPRETATION:  TRANSTHORACIC ECHOCARDIOGRAM REPORT        Patient Name:   JANEE ARCEO Patient Location: [address] Rec #:  [id number] Accession #:      [id number]  Account #:      [id number]   Height:           67.7 in 172.0 cm  YOB: 1945  Weight:           158.7 lb 72.00 kg  Patient Age:    78 years   BSA:              1.85 m²  Patient Gender: M          BP:    97/42 mmHg      Date of Exam:        10/1/2023 10:26:05 AM  Sonographer:         RON  Referring Physician: NANCY    Procedure:     2D Echo/Doppler/Color Doppler Complete.  Indications:   I33.0 - Acute and subacute infective endocarditis  Diagnosis:     I33.0 - Acute and subacute infective endocarditis  Study Details: Technically adequate study. Study quality was adversely   affected                 due to Apical view is limited.        2D AND M-MODE MEASUREMENTS (normal ranges within parentheses):  Left Ventricle:                  Normal   Aorta/Left Atrium:               Normal  IVSd (2D):              1.01 cm (0.7-1.1) Aortic Root (Mmode): 3.93 cm   (2.4-3.7)  LVPWd (2D):             1.19 cm (0.7-1.1) AoV Cusp Separation: 1.70 cm   (1.5-2.6)  LVIDd (2D):             4.68 cm (3.4-5.7) Left Atrium (Mmode): 4.04 cm   (1.9-4.0)  LVIDs (2D):             3.76 cm  LV FS (2D):             19.5 %   (>25%)  LV EF (2D):              40 %    (>55%)  Relative Wall Thickness  0.51    (<0.42)    LV DIASTOLIC FUNCTION:  MV Peak E: 1.15 m/s Decel Time: 424 msec  MV Peak A: 0.12 m/s  E/A Ratio: 9.44    SPECTRAL DOPPLER ANALYSIS (where applicable):  Mitral Valve:  MV P1/2 Time: 123.00 msec  MV Area, PHT: 1.79 cm²    Aortic Valve: AoV Max Inder: 1.45 m/s AoV Peak P.4 mmHg AoV Mean P.5 mmHg    LVOT Vmax: 0.42 m/s LVOT VTI: 0.084 m LVOT Diameter: 2.10 cm    AoV Area, Vmax: 1.00 cm² AoV Area, VTI: 1.37 cm² AoV Area, Vmn: 1.30 cm²  Ao VTI: 0.212  Tricuspid Valve and PA/RV Systolic Pressure: TR Max Velocity: 2.33 m/s RA   Pressure: 10 mmHg RVSP/PASP: 31.6 mmHg      PHYSICIAN INTERPRETATION:  Left Ventricle: The left ventricular internal cavity size is normal.  Global LV systolic function was normal. Left ventricular ejection   fraction, by visual estimation, is 45 to 50%. Abnormal (paradoxical)   septal motion consistent with post-operative status. The left ventricular   diastolic function could not be assessed in this study.  Right Ventricle: The right ventricle was not well visualized.  Left Atrium: Severely enlarged left atrium.  Right Atrium: Moderately enlarged right atrium.  Pericardium: Trivial pericardial effusion is present. The pericardial   effusion is globally located around the entire heart.  Mitral Valve: There ismild mitral annular calcification. MV Prosthetic   Valve.  Tricuspid Valve: The tricuspid valve is normal in structure.   Mild-moderate tricuspid regurgitation is visualized.  Aortic Valve: The aortic valve is trileaflet. Mild aortic stenosis is   present. Trivial aortic valve regurgitation is seen.  Pulmonic Valve: Structurally normal pulmonic valve, with normal leaflet   excursion. The pulmonic valve is normal. Mild pulmonic valve   regurgitation.  Aorta: There is dilatation of the aortic root.      Summary:   1. 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 Biatrial enlargement   4. Left ventricular ejection fraction, by visual estimation, is 45 to   50%.   5. Mild mitral annular calcification.   6. Mild-moderate tricuspid regurgitation.   7. Mild aortic valve stenosis.    [id number] Pilo Lozano , Electronically signed on 10/1/2023   at 11:45:16 AM      Assessment:  patient is a resident at  , h/p mvr, esrd on hd via permacath, came in a few days ago for fever, hypotension, remains encephalopathic, still on pressors. He was found to have high grade mrsa bacteremia. The permacath has been removed. Most likely source.  He could have endocarditis but not clear he would be a valve replacement candidate . TALYA however, would potentially impact management in that we may give synergistic tx if has veg on mv.r  Plan:  continue vanco by level  f/u repeat bc planned for tomorrow am  check vanco level in am, give 1g if level is less than 20  check talya   no permacath until we are sure bacteremia is controlled, planned for temporary cath  CC: f/u for mrsa bacteremia    Patient reports nothing     REVIEW OF SYSTEMS:  All other review of systems negative (Comprehensive ROS)cannot get    Antimicrobials Day #  :    Other Medications Reviewed    T(F): 98 (10-03-23 @ 11:00), Max: 98.1 (10-02-23 @ 20:00)  HR: 95 (10-03-23 @ 13:30)  BP: 115/79 (10-03-23 @ 13:30)  RR: 14 (10-03-23 @ 13:30)  SpO2: 98% (10-03-23 @ 13:30)  Wt(kg): --    PHYSICAL EXAM:  General:  no acute distress  Eyes:  anicteric, no conjunctival injection, no discharge  Oropharynx: no lesions or injection 	  Neck: supple, without adenopathy  Lungs: poor effort  to auscultation  Heart: regular rate and rhythm; no murmur, rubs or gallops  Abdomen: soft, nondistended, nontender, without mass or organomegaly  Skin: no lesions  Extremities: no clubbing, cyanosis. right arm  edema. left leg fused knee  backside no ulcers  Neurologic:somewhat catatonic   LAB RESULTS:                        8.2    9.11  )-----------( 61       ( 03 Oct 2023 06:32 )             24.2     10-03    130<L>  |  98  |  84<H>  ----------------------------<  91  4.0   |  21<L>  |  5.09<H>    Ca    9.1      03 Oct 2023 06:32  Phos  2.3     10-03  Mg     2.2     10-    TPro  6.0  /  Alb  2.3<L>  /  TBili  0.6  /  DBili  x   /  AST  98<H>  /  ALT  78<H>  /  AlkPhos  369<H>  10-03    LIVER FUNCTIONS - ( 03 Oct 2023 06:32 )  Alb: 2.3 g/dL / Pro: 6.0 g/dL / ALK PHOS: 369 U/L / ALT: 78 U/L / AST: 98 U/L / GGT: x           Urinalysis Basic - ( 03 Oct 2023 06:32 )    Color: x / Appearance: x / SG: x / pH: x  Gluc: 91 mg/dL / Ketone: x  / Bili: x / Urobili: x   Blood: x / Protein: x / Nitrite: x   Leuk Esterase: x / RBC: x / WBC x   Sq Epi: x / Non Sq Epi: x / Bacteria: x      MICROBIOLOGY:  RECENT CULTURES:   @ 19:15 .Blood Blood-Peripheral     Growth in aerobic and anaerobic bottles: Methicillin Resistant  Staphylococcus aureus  See previous culture  [id number]    Growth in anaerobic bottle: Gram Positive Cocci in Clusters  Growth in aerobic bottle: Gram Positive Cocci in Clusters     @ 19:10 .Blood Blood-Peripheral Blood Culture PCR  Methicillin resistant Staphylococcus aureus    Growth in aerobic and anaerobic bottles: Methicillin Resistant  Staphylococcus aureus  Direct identification is available within approximately 3-5  hours either by Blood Panel Multiplexed PCR or Direct  MALDI-TOF. Details: [email or web address]    Growth in anaerobic bottle: Gram Positive Cocci in Clusters  Growth in aerobic bottle: Gram Positive Cocci in Clusters        RADIOLOGY REVIEWED:      ACC: [id number] EXAM:  XR CHEST PORTABLE ROUTINE 1V   ORDERED BY: ELMA MONREAL     ACC: [id number] EXAM:  XR CHEST PORTABLE IMMED 1V   ORDERED BY: BRANDIN BURTON     PROCEDURE DATE:  2023          INTERPRETATION:  Chest portable at 7:30 PM on 2023    CLINICAL HISTORY: Sepsis    Comparison:  None available    Cardiomegaly with prior open heart surgery including left atrial   appendage clip and mitral valve replacement. A wireless micropacemaker   versus loop recorder type device is identified. A double lumen dialysis   type catheter is in position.    Lungs reveal coarsened lung markings with areas of linear   atelectasis/patchy infiltrate at the left lower lung and a right-sided   pleural effusion/right mid and lower lung airspace disease. The left   angle is minimally blunted. Bones without acute abnormality.        Chest portable at 8:40 AM on 10/1/2023    CLINICAL HISTORY: Fever/sepsis/follow-up    COMPARISON: Film one day previously    FINDINGS: No change in the heartor mediastinum as above. Right-sided   pleural effusion/airspace disease is minimally improved. No change in the   patchy and linear left lower lobe infiltrates and blunted left angle.    IMPRESSION: Cardiomegaly. Right lower lobe infiltrate/pneumonia and   associated effusion. Patchy left lower lobe areas of   infiltrate/atelectasis. Additional findings as above    --- End of Report ---    ACC: [id number] EXAM:  ECHO TTE WO CON COMP W DOPP                          PROCEDURE DATE:  10/01/2023          INTERPRETATION:  TRANSTHORACIC ECHOCARDIOGRAM REPORT        Patient Name:   JANEE ARCEO Patient Location: [address] Rec #:  [id number] Accession #:      [id number]  Account #:      [id number]   Height:           67.7 in 172.0 cm  YOB: 1945  Weight:           158.7 lb 72.00 kg  Patient Age:    78 years   BSA:              1.85 m²  Patient Gender: M          BP:    97/42 mmHg      Date of Exam:        10/1/2023 10:26:05 AM  Sonographer:         RON  Referring Physician: NANCY    Procedure:     2D Echo/Doppler/Color Doppler Complete.  Indications:   I33.0 - Acute and subacute infective endocarditis  Diagnosis:     I33.0 - Acute and subacute infective endocarditis  Study Details: Technically adequate study. Study quality was adversely   affected                 due to Apical view is limited.        2D AND M-MODE MEASUREMENTS (normal ranges within parentheses):  Left Ventricle:                  Normal   Aorta/Left Atrium:               Normal  IVSd (2D):              1.01 cm (0.7-1.1) Aortic Root (Mmode): 3.93 cm   (2.4-3.7)  LVPWd (2D):             1.19 cm (0.7-1.1) AoV Cusp Separation: 1.70 cm   (1.5-2.6)  LVIDd (2D):             4.68 cm (3.4-5.7) Left Atrium (Mmode): 4.04 cm   (1.9-4.0)  LVIDs (2D):             3.76 cm  LV FS (2D):             19.5 %   (>25%)  LV EF (2D):              40 %    (>55%)  Relative Wall Thickness  0.51    (<0.42)    LV DIASTOLIC FUNCTION:  MV Peak E: 1.15 m/s Decel Time: 424 msec  MV Peak A: 0.12 m/s  E/A Ratio: 9.44    SPECTRAL DOPPLER ANALYSIS (where applicable):  Mitral Valve:  MV P1/2 Time: 123.00 msec  MV Area, PHT: 1.79 cm²    Aortic Valve: AoV Max Inder: 1.45 m/s AoV Peak P.4 mmHg AoV Mean P.5 mmHg    LVOT Vmax: 0.42 m/s LVOT VTI: 0.084 m LVOT Diameter: 2.10 cm    AoV Area, Vmax: 1.00 cm² AoV Area, VTI: 1.37 cm² AoV Area, Vmn: 1.30 cm²  Ao VTI: 0.212  Tricuspid Valve and PA/RV Systolic Pressure: TR Max Velocity: 2.33 m/s RA   Pressure: 10 mmHg RVSP/PASP: 31.6 mmHg      PHYSICIAN INTERPRETATION:  Left Ventricle: The left ventricular internal cavity size is normal.  Global LV systolic function was normal. Left ventricular ejection   fraction, by visual estimation, is 45 to 50%. Abnormal (paradoxical)   septal motion consistent with post-operative status. The left ventricular   diastolic function could not be assessed in this study.  Right Ventricle: The right ventricle was not well visualized.  Left Atrium: Severely enlarged left atrium.  Right Atrium: Moderately enlarged right atrium.  Pericardium: Trivial pericardial effusion is present. The pericardial   effusion is globally located around the entire heart.  Mitral Valve: There ismild mitral annular calcification. MV Prosthetic   Valve.  Tricuspid Valve: The tricuspid valve is normal in structure.   Mild-moderate tricuspid regurgitation is visualized.  Aortic Valve: The aortic valve is trileaflet. Mild aortic stenosis is   present. Trivial aortic valve regurgitation is seen.  Pulmonic Valve: Structurally normal pulmonic valve, with normal leaflet   excursion. The pulmonic valve is normal. Mild pulmonic valve   regurgitation.  Aorta: There is dilatation of the aortic root.      Summary:   1. 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 Biatrial enlargement   4. Left ventricular ejection fraction, by visual estimation, is 45 to   50%.   5. Mild mitral annular calcification.   6. Mild-moderate tricuspid regurgitation.   7. Mild aortic valve stenosis.    [id number] Pilo Lozano , Electronically signed on 10/1/2023   at 11:45:16 AM      Assessment:  patient is a resident at  , h/p mvr, esrd on hd via permacath, came in a few days ago for fever, hypotension, remains encephalopathic, still on pressors. He was found to have high grade mrsa bacteremia. The permacath has been removed. Most likely source.  He could have endocarditis but not clear he would be a valve replacement candidate . TALYA however, would potentially impact management in that we may give synergistic tx if has veg on mv.r  Plan:  continue vanco by level  f/u repeat bc planned for tomorrow am  check vanco level in am, give 1g if level is less than 20  check talya   no permacath until we are sure bacteremia is controlled, planned for temporary cath

## 2023-10-03 NOTE — CONSULT NOTE ADULT - SUBJECTIVE AND OBJECTIVE BOX
JANEE ARCEO  [id number]      HPI:    Janee Arceo is a 78 year old man with past medical history of Coronary artery disease (s/p CABG), Bioprosthetic MVR, ANAHI closure with AtriClip all in 2021, Cardiomyopathy, Atrial fibrillation, End stage renal disease with prior hospitalization in March with pacemaker extraction secondary to MSSA bacteremia with replacement with Micra PPM, with recent hospitalization at [facility] in August for rectal bleeding     with fatigue, found to have septic shock           ALLERGIES:  levofloxacin (Unknown)  alfuzosin (Unknown)  tamsulosin (Unknown)  Myrbetriq (Unknown)      PAST MEDICAL & SURGICAL HISTORY:  Chronic atrial fibrillation      History of BPH      CAD (coronary artery disease)      Coronary stent patent      ESRD on dialysis      History of GI bleed      Mitral valve replaced            CURRENT MEDICATIONS:  acetaminophen     Tablet .. 650 milliGRAM(s) Oral every 6 hours PRN  albumin human 25% IVPB 50 milliLiter(s) IV Intermittent once  aluminum hydroxide/magnesium hydroxide/simethicone Suspension 10 milliLiter(s) Oral every 6 hours PRN  aspirin  chewable 81 milliGRAM(s) Oral daily  folic acid 1 milliGRAM(s) Oral daily  hydrocortisone hemorrhoidal Suppository 1 Suppository(s) Rectal two times a day  influenza  Vaccine (HIGH DOSE) 0.7 milliLiter(s) IntraMuscular once  levothyroxine 137 MICROGram(s) Oral daily  mesalamine Suppository 1000 milliGRAM(s) Rectal at bedtime  midodrine 10 milliGRAM(s) Oral every 8 hours  mupirocin 2% Nasal 1 Application(s) Both Nostrils two times a day  norepinephrine Infusion 0.05 MICROgram(s)/kG/Min IV Continuous <Continuous>  pantoprazole    Tablet 40 milliGRAM(s) Oral before breakfast  polyethylene glycol 3350 17 Gram(s) Oral daily  senna 2 Tablet(s) Oral at bedtime      SOCIAL HISTORY:      FAMILY HISTORY:      ROS:  All 10 systems reviewed and positives noted in HPI    OBJECTIVE:    VITAL SIGNS:  Vital Signs Last 24 Hrs  T(C): 36.7 (03 Oct 2023 11:00), Max: 36.7 (02 Oct 2023 20:00)  T(F): 98 (03 Oct 2023 11:00), Max: 98.1 (02 Oct 2023 20:00)  HR: 95 (03 Oct 2023 13:30) (81 - 109)  BP: 115/79 (03 Oct 2023 13:30) (80/48 - 116/56)  BP(mean): 91 (03 Oct 2023 13:30) (58 - 91)  RR: 14 (03 Oct 2023 13:30) (10 - 25)  SpO2: 98% (03 Oct 2023 13:30) (96% - 100%)    Parameters below as of 03 Oct 2023 06:00  Patient On (Oxygen Delivery Method): nasal cannula  O2 Flow (L/min): 2      PHYSICAL EXAM:  General: well appearing, no distress  HEENT: sclera anicteric  Neck: supple, no carotid bruits b/l  CVS: JVP ~ 7 cm H20, RRR, s1, s2, no murmurs/rubs/gallops  Chest: unlabored respirations, clear to auscultation b/l  Abdomen: non-distended  Extremities: no lower extremity edema b/l  Neuro: awake, alert & oriented x 3  Psych: normal affect      LABS:                        8.2    9.11  )-----------( 61       ( 03 Oct 2023 06:32 )             24.2     10-03    130<L>  |  98  |  84<H>  ----------------------------<  91  4.0   |  21<L>  |  5.09<H>    Ca    9.1      03 Oct 2023 06:32  Phos  2.3     10-03  Mg     2.2     10-03    TPro  6.0  /  Alb  2.3<L>  /  TBili  0.6  /  DBili  x   /  AST  98<H>  /  ALT  78<H>  /  AlkPhos  369<H>  10-03          Coronary angiogram (7/2022):  LM: mild atherosclerosis  LAD: mid 50% stenosis  D1: 60% stenosis  D2: 90% stenosis  LCx: 95% stenosis  RCA: mild atherosclerosis   LIMA-D2 graft: no disease  SVG-OM1: no disease    ERENDIRA (3/2023):  LVEF 25-30%  Reduced RV systolic function   Moderate AR, Moderate-severe TR, Bio-MVR  No obvious vegetations visualized  No pericardial effusion seen.       TTE (8/2023):  LVEF 53%  Mildly enlarged RV size with reduced systolic function  Severe biatrial enlargement  Mild-moderate TR, Mild-moderate AR  Bio-MVR  No obvious evidence of endocarditis. Consider ERENDIRA if clinically indicated.    ECG (9/30/23): atrial fibrillation, PVC  ECG (10/3/23): atrial fibrillation, old inferior infarct, ventricular couplet     JANEE ARCEO  [id number]      HPI:    Janee Arceo is a 78 year old man with past medical history of Coronary artery disease (s/p CABG), Bioprosthetic MVR, ANAHI closure with AtriClip all in 2021, Cardiomyopathy, Atrial fibrillation, End stage renal disease with prior hospitalization in March with pacemaker extraction secondary to MSSA bacteremia with replacement with Micra PPM, with recent hospitalization at [facility] in August for rectal bleeding     with fatigue, found to have septic shock           ALLERGIES:  levofloxacin (Unknown)  alfuzosin (Unknown)  tamsulosin (Unknown)  Myrbetriq (Unknown)      PAST MEDICAL & SURGICAL HISTORY:  Chronic atrial fibrillation      History of BPH      CAD (coronary artery disease)      Coronary stent patent      ESRD on dialysis      History of GI bleed      Mitral valve replaced            CURRENT MEDICATIONS:  acetaminophen     Tablet .. 650 milliGRAM(s) Oral every 6 hours PRN  albumin human 25% IVPB 50 milliLiter(s) IV Intermittent once  aluminum hydroxide/magnesium hydroxide/simethicone Suspension 10 milliLiter(s) Oral every 6 hours PRN  aspirin  chewable 81 milliGRAM(s) Oral daily  folic acid 1 milliGRAM(s) Oral daily  hydrocortisone hemorrhoidal Suppository 1 Suppository(s) Rectal two times a day  influenza  Vaccine (HIGH DOSE) 0.7 milliLiter(s) IntraMuscular once  levothyroxine 137 MICROGram(s) Oral daily  mesalamine Suppository 1000 milliGRAM(s) Rectal at bedtime  midodrine 10 milliGRAM(s) Oral every 8 hours  mupirocin 2% Nasal 1 Application(s) Both Nostrils two times a day  norepinephrine Infusion 0.05 MICROgram(s)/kG/Min IV Continuous <Continuous>  pantoprazole    Tablet 40 milliGRAM(s) Oral before breakfast  polyethylene glycol 3350 17 Gram(s) Oral daily  senna 2 Tablet(s) Oral at bedtime      SOCIAL HISTORY:      FAMILY HISTORY:      ROS:  All 10 systems reviewed and positives noted in HPI    OBJECTIVE:    VITAL SIGNS:  Vital Signs Last 24 Hrs  T(C): 36.7 (03 Oct 2023 11:00), Max: 36.7 (02 Oct 2023 20:00)  T(F): 98 (03 Oct 2023 11:00), Max: 98.1 (02 Oct 2023 20:00)  HR: 95 (03 Oct 2023 13:30) (81 - 109)  BP: 115/79 (03 Oct 2023 13:30) (80/48 - 116/56)  BP(mean): 91 (03 Oct 2023 13:30) (58 - 91)  RR: 14 (03 Oct 2023 13:30) (10 - 25)  SpO2: 98% (03 Oct 2023 13:30) (96% - 100%)    Parameters below as of 03 Oct 2023 06:00  Patient On (Oxygen Delivery Method): nasal cannula  O2 Flow (L/min): 2      PHYSICAL EXAM:  General: well appearing, no distress  HEENT: sclera anicteric  Neck: supple, no carotid bruits b/l  CVS: JVP ~ 7 cm H20, RRR, s1, s2, no murmurs/rubs/gallops  Chest: unlabored respirations, clear to auscultation b/l  Abdomen: non-distended  Extremities: no lower extremity edema b/l  Neuro: awake, alert & oriented x 3  Psych: normal affect      LABS:                        8.2    9.11  )-----------( 61       ( 03 Oct 2023 06:32 )             24.2     10-03    130<L>  |  98  |  84<H>  ----------------------------<  91  4.0   |  21<L>  |  5.09<H>    Ca    9.1      03 Oct 2023 06:32  Phos  2.3     10-03  Mg     2.2     10-03    TPro  6.0  /  Alb  2.3<L>  /  TBili  0.6  /  DBili  x   /  AST  98<H>  /  ALT  78<H>  /  AlkPhos  369<H>  10-03          Coronary angiogram (7/2022):  LM: mild atherosclerosis  LAD: mid 50% stenosis  D1: 60% stenosis  D2: 90% stenosis  LCx: 95% stenosis  RCA: mild atherosclerosis   LIMA-D2 graft: no disease  SVG-OM1: no disease    ERENDIRA (3/2023):  LVEF 25-30%  Reduced RV systolic function   Moderate AR, Moderate-severe TR, Bio-MVR  No obvious vegetations visualized  No pericardial effusion seen.       TTE (8/2023):  LVEF 53%  Mildly enlarged RV size with reduced systolic function  Severe biatrial enlargement  Mild-moderate TR, Mild-moderate AR  Bio-MVR  No obvious evidence of endocarditis. Consider ERENDIRA if clinically indicated.    ECG (9/30/23): atrial fibrillation, PVC  ECG (10/3/23): atrial fibrillation, old inferior infarct, ventricular couplet     JANEE ARCEO  [id number]      HPI:    Janee Arceo is a 78 year old man with past medical history of Coronary artery disease (s/p CABG), Bioprosthetic MVR, ANAHI closure with AtriClip all in 2021, Cardiomyopathy, Atrial fibrillation, End stage renal disease with prior hospitalization in March with pacemaker extraction secondary to MSSA bacteremia with replacement with Micra PPM, with recent hospitalization at [facility] in August for rectal bleeding     with fatigue, found to have septic shock           ALLERGIES:  levofloxacin (Unknown)  alfuzosin (Unknown)  tamsulosin (Unknown)  Myrbetriq (Unknown)      PAST MEDICAL & SURGICAL HISTORY:  Chronic atrial fibrillation      History of BPH      CAD (coronary artery disease)      Coronary stent patent      ESRD on dialysis      History of GI bleed      Mitral valve replaced            CURRENT MEDICATIONS:  acetaminophen     Tablet .. 650 milliGRAM(s) Oral every 6 hours PRN  albumin human 25% IVPB 50 milliLiter(s) IV Intermittent once  aluminum hydroxide/magnesium hydroxide/simethicone Suspension 10 milliLiter(s) Oral every 6 hours PRN  aspirin  chewable 81 milliGRAM(s) Oral daily  folic acid 1 milliGRAM(s) Oral daily  hydrocortisone hemorrhoidal Suppository 1 Suppository(s) Rectal two times a day  influenza  Vaccine (HIGH DOSE) 0.7 milliLiter(s) IntraMuscular once  levothyroxine 137 MICROGram(s) Oral daily  mesalamine Suppository 1000 milliGRAM(s) Rectal at bedtime  midodrine 10 milliGRAM(s) Oral every 8 hours  mupirocin 2% Nasal 1 Application(s) Both Nostrils two times a day  norepinephrine Infusion 0.05 MICROgram(s)/kG/Min IV Continuous <Continuous>  pantoprazole    Tablet 40 milliGRAM(s) Oral before breakfast  polyethylene glycol 3350 17 Gram(s) Oral daily  senna 2 Tablet(s) Oral at bedtime      SOCIAL HISTORY:      FAMILY HISTORY:      ROS:  All 10 systems reviewed and positives noted in HPI    OBJECTIVE:    VITAL SIGNS:  Vital Signs Last 24 Hrs  T(C): 36.7 (03 Oct 2023 11:00), Max: 36.7 (02 Oct 2023 20:00)  T(F): 98 (03 Oct 2023 11:00), Max: 98.1 (02 Oct 2023 20:00)  HR: 95 (03 Oct 2023 13:30) (81 - 109)  BP: 115/79 (03 Oct 2023 13:30) (80/48 - 116/56)  BP(mean): 91 (03 Oct 2023 13:30) (58 - 91)  RR: 14 (03 Oct 2023 13:30) (10 - 25)  SpO2: 98% (03 Oct 2023 13:30) (96% - 100%)    Parameters below as of 03 Oct 2023 06:00  Patient On (Oxygen Delivery Method): nasal cannula  O2 Flow (L/min): 2      PHYSICAL EXAM:  General: well appearing, no distress  HEENT: sclera anicteric  Neck: supple, no carotid bruits b/l  CVS: JVP ~ 7 cm H20, RRR, s1, s2, no murmurs/rubs/gallops  Chest: unlabored respirations, clear to auscultation b/l  Abdomen: non-distended  Extremities: no lower extremity edema b/l  Neuro: awake, alert & oriented x 3  Psych: normal affect      LABS:                        8.2    9.11  )-----------( 61       ( 03 Oct 2023 06:32 )             24.2     10-03    130<L>  |  98  |  84<H>  ----------------------------<  91  4.0   |  21<L>  |  5.09<H>    Ca    9.1      03 Oct 2023 06:32  Phos  2.3     10-03  Mg     2.2     10-03    TPro  6.0  /  Alb  2.3<L>  /  TBili  0.6  /  DBili  x   /  AST  98<H>  /  ALT  78<H>  /  AlkPhos  369<H>  10-03          Coronary angiogram (7/2022):  LM: mild atherosclerosis  LAD: mid 50% stenosis  D1: 60% stenosis  D2: 90% stenosis  LCx: 95% stenosis  RCA: mild atherosclerosis   LIMA-D2 graft: no disease  SVG-OM1: no disease    ERENDIRA (3/2023):  LVEF 25-30%  Reduced RV systolic function   Moderate AR, Moderate-severe TR, Bio-MVR  No obvious vegetations visualized  No pericardial effusion seen.       TTE (8/2023):  LVEF 53%  Mildly enlarged RV size with reduced systolic function  Severe biatrial enlargement  Mild-moderate TR, Mild-moderate AR  Bio-MVR  No obvious evidence of endocarditis. Consider ERENDIRA if clinically indicated.    ECG (9/30/23): atrial fibrillation, PVC  ECG (10/3/23): atrial fibrillation, old inferior infarct, ventricular couplet     JANEE ARCEO  [id number]      HPI:    Janee Arceo is a 78 year old man with past medical history of Coronary artery disease (s/p CABG), Bioprosthetic MVR, ANAHI closure with AtriClip all in 2021, Cardiomyopathy, Atrial fibrillation, End stage renal disease with prior hospitalization in March with pacemaker extraction secondary to MSSA bacteremia with replacement with Micra PPM, with recent hospitalization at [facility] in August for rectal bleeding     with fatigue, found to have septic shock           ALLERGIES:  levofloxacin (Unknown)  alfuzosin (Unknown)  tamsulosin (Unknown)  Myrbetriq (Unknown)      PAST MEDICAL & SURGICAL HISTORY:  Chronic atrial fibrillation      History of BPH      CAD (coronary artery disease)      Coronary stent patent      ESRD on dialysis      History of GI bleed      Mitral valve replaced            CURRENT MEDICATIONS:  acetaminophen     Tablet .. 650 milliGRAM(s) Oral every 6 hours PRN  albumin human 25% IVPB 50 milliLiter(s) IV Intermittent once  aluminum hydroxide/magnesium hydroxide/simethicone Suspension 10 milliLiter(s) Oral every 6 hours PRN  aspirin  chewable 81 milliGRAM(s) Oral daily  folic acid 1 milliGRAM(s) Oral daily  hydrocortisone hemorrhoidal Suppository 1 Suppository(s) Rectal two times a day  influenza  Vaccine (HIGH DOSE) 0.7 milliLiter(s) IntraMuscular once  levothyroxine 137 MICROGram(s) Oral daily  mesalamine Suppository 1000 milliGRAM(s) Rectal at bedtime  midodrine 10 milliGRAM(s) Oral every 8 hours  mupirocin 2% Nasal 1 Application(s) Both Nostrils two times a day  norepinephrine Infusion 0.05 MICROgram(s)/kG/Min IV Continuous <Continuous>  pantoprazole    Tablet 40 milliGRAM(s) Oral before breakfast  polyethylene glycol 3350 17 Gram(s) Oral daily  senna 2 Tablet(s) Oral at bedtime      SOCIAL HISTORY:      FAMILY HISTORY:      ROS:  All 10 systems reviewed and positives noted in HPI    OBJECTIVE:    VITAL SIGNS:  Vital Signs Last 24 Hrs  T(C): 36.7 (03 Oct 2023 11:00), Max: 36.7 (02 Oct 2023 20:00)  T(F): 98 (03 Oct 2023 11:00), Max: 98.1 (02 Oct 2023 20:00)  HR: 95 (03 Oct 2023 13:30) (81 - 109)  BP: 115/79 (03 Oct 2023 13:30) (80/48 - 116/56)  BP(mean): 91 (03 Oct 2023 13:30) (58 - 91)  RR: 14 (03 Oct 2023 13:30) (10 - 25)  SpO2: 98% (03 Oct 2023 13:30) (96% - 100%)    Parameters below as of 03 Oct 2023 06:00  Patient On (Oxygen Delivery Method): nasal cannula  O2 Flow (L/min): 2      PHYSICAL EXAM:  General: well appearing, no distress  HEENT: sclera anicteric  Neck: supple, no carotid bruits b/l  CVS: JVP ~ 7 cm H20, RRR, s1, s2, no murmurs/rubs/gallops  Chest: unlabored respirations, clear to auscultation b/l  Abdomen: non-distended  Extremities: no lower extremity edema b/l  Neuro: awake, alert & oriented x 3  Psych: normal affect      LABS:                        8.2    9.11  )-----------( 61       ( 03 Oct 2023 06:32 )             24.2     10-03    130<L>  |  98  |  84<H>  ----------------------------<  91  4.0   |  21<L>  |  5.09<H>    Ca    9.1      03 Oct 2023 06:32  Phos  2.3     10-03  Mg     2.2     10-03    TPro  6.0  /  Alb  2.3<L>  /  TBili  0.6  /  DBili  x   /  AST  98<H>  /  ALT  78<H>  /  AlkPhos  369<H>  10-03          Coronary angiogram (7/2022):  LM: mild atherosclerosis  LAD: mid 50% stenosis  D1: 60% stenosis  D2: 90% stenosis  LCx: 95% stenosis  RCA: mild atherosclerosis   LIMA-D2 graft: no disease  SVG-OM1: no disease    ERENDIRA (3/2023):  LVEF 25-30%  Reduced RV systolic function   Moderate AR, Moderate-severe TR, Bio-MVR  No obvious vegetations visualized  No pericardial effusion seen.       TTE (8/2023):  LVEF 53%  Mildly enlarged RV size with reduced systolic function  Severe biatrial enlargement  Mild-moderate TR, Mild-moderate AR  Bio-MVR  No obvious evidence of endocarditis. Consider ERENDIRA if clinically indicated.    TTE (10/1/23):   1. 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 Biatrial enlargement   4. Left ventricular ejection fraction, by visual estimation, is 45 to 50%.   5. Mild mitral annular calcification.   6. Mild-moderate tricuspid regurgitation.   7. Mild aortic valve stenosis.      ECG (9/30/23): atrial fibrillation, PVC  ECG (10/3/23): atrial fibrillation, old inferior infarct, ventricular couplet     JANEE ARCEO  [id number]      HPI:    Janee Arceo is a 78 year old man with past medical history of Coronary artery disease (s/p CABG), Bioprosthetic MVR, ANAHI closure with AtriClip all in 2021, Cardiomyopathy, Atrial fibrillation, End stage renal disease with prior hospitalization in March with pacemaker extraction secondary to MSSA bacteremia with replacement with Micra PPM, with recent hospitalization at [facility] in August for rectal bleeding     with fatigue, found to have septic shock           ALLERGIES:  levofloxacin (Unknown)  alfuzosin (Unknown)  tamsulosin (Unknown)  Myrbetriq (Unknown)      PAST MEDICAL & SURGICAL HISTORY:  Chronic atrial fibrillation      History of BPH      CAD (coronary artery disease)      Coronary stent patent      ESRD on dialysis      History of GI bleed      Mitral valve replaced            CURRENT MEDICATIONS:  acetaminophen     Tablet .. 650 milliGRAM(s) Oral every 6 hours PRN  albumin human 25% IVPB 50 milliLiter(s) IV Intermittent once  aluminum hydroxide/magnesium hydroxide/simethicone Suspension 10 milliLiter(s) Oral every 6 hours PRN  aspirin  chewable 81 milliGRAM(s) Oral daily  folic acid 1 milliGRAM(s) Oral daily  hydrocortisone hemorrhoidal Suppository 1 Suppository(s) Rectal two times a day  influenza  Vaccine (HIGH DOSE) 0.7 milliLiter(s) IntraMuscular once  levothyroxine 137 MICROGram(s) Oral daily  mesalamine Suppository 1000 milliGRAM(s) Rectal at bedtime  midodrine 10 milliGRAM(s) Oral every 8 hours  mupirocin 2% Nasal 1 Application(s) Both Nostrils two times a day  norepinephrine Infusion 0.05 MICROgram(s)/kG/Min IV Continuous <Continuous>  pantoprazole    Tablet 40 milliGRAM(s) Oral before breakfast  polyethylene glycol 3350 17 Gram(s) Oral daily  senna 2 Tablet(s) Oral at bedtime      SOCIAL HISTORY:      FAMILY HISTORY:      ROS:  All 10 systems reviewed and positives noted in HPI    OBJECTIVE:    VITAL SIGNS:  Vital Signs Last 24 Hrs  T(C): 36.7 (03 Oct 2023 11:00), Max: 36.7 (02 Oct 2023 20:00)  T(F): 98 (03 Oct 2023 11:00), Max: 98.1 (02 Oct 2023 20:00)  HR: 95 (03 Oct 2023 13:30) (81 - 109)  BP: 115/79 (03 Oct 2023 13:30) (80/48 - 116/56)  BP(mean): 91 (03 Oct 2023 13:30) (58 - 91)  RR: 14 (03 Oct 2023 13:30) (10 - 25)  SpO2: 98% (03 Oct 2023 13:30) (96% - 100%)    Parameters below as of 03 Oct 2023 06:00  Patient On (Oxygen Delivery Method): nasal cannula  O2 Flow (L/min): 2      PHYSICAL EXAM:  General: well appearing, no distress  HEENT: sclera anicteric  Neck: supple, no carotid bruits b/l  CVS: JVP ~ 7 cm H20, RRR, s1, s2, no murmurs/rubs/gallops  Chest: unlabored respirations, clear to auscultation b/l  Abdomen: non-distended  Extremities: no lower extremity edema b/l  Neuro: awake, alert & oriented x 3  Psych: normal affect      LABS:                        8.2    9.11  )-----------( 61       ( 03 Oct 2023 06:32 )             24.2     10-03    130<L>  |  98  |  84<H>  ----------------------------<  91  4.0   |  21<L>  |  5.09<H>    Ca    9.1      03 Oct 2023 06:32  Phos  2.3     10-03  Mg     2.2     10-03    TPro  6.0  /  Alb  2.3<L>  /  TBili  0.6  /  DBili  x   /  AST  98<H>  /  ALT  78<H>  /  AlkPhos  369<H>  10-03          Coronary angiogram (7/2022):  LM: mild atherosclerosis  LAD: mid 50% stenosis  D1: 60% stenosis  D2: 90% stenosis  LCx: 95% stenosis  RCA: mild atherosclerosis   LIMA-D2 graft: no disease  SVG-OM1: no disease    ERENDIRA (3/2023):  LVEF 25-30%  Reduced RV systolic function   Moderate AR, Moderate-severe TR, Bio-MVR  No obvious vegetations visualized  No pericardial effusion seen.       TTE (8/2023):  LVEF 53%  Mildly enlarged RV size with reduced systolic function  Severe biatrial enlargement  Mild-moderate TR, Mild-moderate AR  Bio-MVR  No obvious evidence of endocarditis. Consider ERENDIRA if clinically indicated.    TTE (10/1/23):   1. 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 Biatrial enlargement   4. Left ventricular ejection fraction, by visual estimation, is 45 to 50%.   5. Mild mitral annular calcification.   6. Mild-moderate tricuspid regurgitation.   7. Mild aortic valve stenosis.      ECG (9/30/23): atrial fibrillation, PVC  ECG (10/3/23): atrial fibrillation, old inferior infarct, ventricular couplet     JANEE ARCEO  [id number]      HPI:    Janee Arceo is a 78 year old man with past medical history of Coronary artery disease (s/p CABG), Bioprosthetic MVR, ANAHI closure with AtriClip all in 2021, Cardiomyopathy, Atrial fibrillation, End stage renal disease with prior hospitalization in March with pacemaker extraction secondary to MSSA bacteremia with replacement with Micra PPM, with recent hospitalization at [facility] in August for rectal bleeding     with fatigue, found to have septic shock           ALLERGIES:  levofloxacin (Unknown)  alfuzosin (Unknown)  tamsulosin (Unknown)  Myrbetriq (Unknown)      PAST MEDICAL & SURGICAL HISTORY:  Chronic atrial fibrillation      History of BPH      CAD (coronary artery disease)      Coronary stent patent      ESRD on dialysis      History of GI bleed      Mitral valve replaced            CURRENT MEDICATIONS:  acetaminophen     Tablet .. 650 milliGRAM(s) Oral every 6 hours PRN  albumin human 25% IVPB 50 milliLiter(s) IV Intermittent once  aluminum hydroxide/magnesium hydroxide/simethicone Suspension 10 milliLiter(s) Oral every 6 hours PRN  aspirin  chewable 81 milliGRAM(s) Oral daily  folic acid 1 milliGRAM(s) Oral daily  hydrocortisone hemorrhoidal Suppository 1 Suppository(s) Rectal two times a day  influenza  Vaccine (HIGH DOSE) 0.7 milliLiter(s) IntraMuscular once  levothyroxine 137 MICROGram(s) Oral daily  mesalamine Suppository 1000 milliGRAM(s) Rectal at bedtime  midodrine 10 milliGRAM(s) Oral every 8 hours  mupirocin 2% Nasal 1 Application(s) Both Nostrils two times a day  norepinephrine Infusion 0.05 MICROgram(s)/kG/Min IV Continuous <Continuous>  pantoprazole    Tablet 40 milliGRAM(s) Oral before breakfast  polyethylene glycol 3350 17 Gram(s) Oral daily  senna 2 Tablet(s) Oral at bedtime      SOCIAL HISTORY:      FAMILY HISTORY:      ROS:  All 10 systems reviewed and positives noted in HPI    OBJECTIVE:    VITAL SIGNS:  Vital Signs Last 24 Hrs  T(C): 36.7 (03 Oct 2023 11:00), Max: 36.7 (02 Oct 2023 20:00)  T(F): 98 (03 Oct 2023 11:00), Max: 98.1 (02 Oct 2023 20:00)  HR: 95 (03 Oct 2023 13:30) (81 - 109)  BP: 115/79 (03 Oct 2023 13:30) (80/48 - 116/56)  BP(mean): 91 (03 Oct 2023 13:30) (58 - 91)  RR: 14 (03 Oct 2023 13:30) (10 - 25)  SpO2: 98% (03 Oct 2023 13:30) (96% - 100%)    Parameters below as of 03 Oct 2023 06:00  Patient On (Oxygen Delivery Method): nasal cannula  O2 Flow (L/min): 2      PHYSICAL EXAM:  General: well appearing, no distress  HEENT: sclera anicteric  Neck: supple, no carotid bruits b/l  CVS: JVP ~ 7 cm H20, RRR, s1, s2, no murmurs/rubs/gallops  Chest: unlabored respirations, clear to auscultation b/l  Abdomen: non-distended  Extremities: no lower extremity edema b/l  Neuro: awake, alert & oriented x 3  Psych: normal affect      LABS:                        8.2    9.11  )-----------( 61       ( 03 Oct 2023 06:32 )             24.2     10-03    130<L>  |  98  |  84<H>  ----------------------------<  91  4.0   |  21<L>  |  5.09<H>    Ca    9.1      03 Oct 2023 06:32  Phos  2.3     10-03  Mg     2.2     10-03    TPro  6.0  /  Alb  2.3<L>  /  TBili  0.6  /  DBili  x   /  AST  98<H>  /  ALT  78<H>  /  AlkPhos  369<H>  10-03          Coronary angiogram (7/2022):  LM: mild atherosclerosis  LAD: mid 50% stenosis  D1: 60% stenosis  D2: 90% stenosis  LCx: 95% stenosis  RCA: mild atherosclerosis   LIMA-D2 graft: no disease  SVG-OM1: no disease    ERENDIRA (3/2023):  LVEF 25-30%  Reduced RV systolic function   Moderate AR, Moderate-severe TR, Bio-MVR  No obvious vegetations visualized  No pericardial effusion seen.       TTE (8/2023):  LVEF 53%  Mildly enlarged RV size with reduced systolic function  Severe biatrial enlargement  Mild-moderate TR, Mild-moderate AR  Bio-MVR  No obvious evidence of endocarditis. Consider ERENDIRA if clinically indicated.    TTE (10/1/23):   1. 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 Biatrial enlargement   4. Left ventricular ejection fraction, by visual estimation, is 45 to 50%.   5. Mild mitral annular calcification.   6. Mild-moderate tricuspid regurgitation.   7. Mild aortic valve stenosis.      ECG (9/30/23): atrial fibrillation, PVC  ECG (10/3/23): atrial fibrillation, old inferior infarct, ventricular couplet     JANEE ARCEO  [id number]      HPI:    Janee Arceo is a 78 year old man with past medical history of Coronary artery disease (s/p CABG), Bioprosthetic MVR, ANAHI closure with AtriClip in 2021, Cardiomyopathy, Atrial fibrillation, End stage renal disease with prior hospitalization in March at [facility] with pacemaker extraction secondary to MSSA bacteremia with replacement with Micra PPM, then recent hospitalization at [facility] in August for rectal bleeding now presented with fever and cough, found to have septic shock, currently admitted to ICU.       ALLERGIES:  levofloxacin (Unknown)  alfuzosin (Unknown)  tamsulosin (Unknown)  Myrbetriq (Unknown)      PAST MEDICAL & SURGICAL HISTORY:  Atrial fibrillation  CAD (coronary artery disease)  ESRD on dialysis  History of GI bleed  Mitral valve replaced    CURRENT MEDICATIONS:  acetaminophen     Tablet .. 650 milliGRAM(s) Oral every 6 hours PRN  albumin human 25% IVPB 50 milliLiter(s) IV Intermittent once  aluminum hydroxide/magnesium hydroxide/simethicone Suspension 10 milliLiter(s) Oral every 6 hours PRN  aspirin  chewable 81 milliGRAM(s) Oral daily  folic acid 1 milliGRAM(s) Oral daily  hydrocortisone hemorrhoidal Suppository 1 Suppository(s) Rectal two times a day  influenza  Vaccine (HIGH DOSE) 0.7 milliLiter(s) IntraMuscular once  levothyroxine 137 MICROGram(s) Oral daily  mesalamine Suppository 1000 milliGRAM(s) Rectal at bedtime  midodrine 10 milliGRAM(s) Oral every 8 hours  mupirocin 2% Nasal 1 Application(s) Both Nostrils two times a day  norepinephrine Infusion 0.05 MICROgram(s)/kG/Min IV Continuous <Continuous>  pantoprazole    Tablet 40 milliGRAM(s) Oral before breakfast  polyethylene glycol 3350 17 Gram(s) Oral daily  senna 2 Tablet(s) Oral at bedtime      ROS:  All 10 systems reviewed and positives noted in HPI    OBJECTIVE:    VITAL SIGNS:  Vital Signs Last 24 Hrs  T(C): 36.7 (03 Oct 2023 11:00), Max: 36.7 (02 Oct 2023 20:00)  T(F): 98 (03 Oct 2023 11:00), Max: 98.1 (02 Oct 2023 20:00)  HR: 95 (03 Oct 2023 13:30) (81 - 109)  BP: 115/79 (03 Oct 2023 13:30) (80/48 - 116/56)  BP(mean): 91 (03 Oct 2023 13:30) (58 - 91)  RR: 14 (03 Oct 2023 13:30) (10 - 25)  SpO2: 98% (03 Oct 2023 13:30) (96% - 100%)    Parameters below as of 03 Oct 2023 06:00  Patient On (Oxygen Delivery Method): nasal cannula  O2 Flow (L/min): 2      PHYSICAL EXAM:  General: elderly man, altered, chronically ill appearing  HEENT: sclera anicteric  Neck: supple  CVS: JVP ~ 7 cm H20, irregularly irregular, s1, s2  Chest: decreased breath sounds  Abdomen: non-distended  Extremities: no lower extremity edema b/l  Neuro: minimally awake      LABS:                        8.2    9.11  )-----------( 61       ( 03 Oct 2023 06:32 )             24.2     10-03    130<L>  |  98  |  84<H>  ----------------------------<  91  4.0   |  21<L>  |  5.09<H>    Ca    9.1      03 Oct 2023 06:32  Phos  2.3     10-03  Mg     2.2     10-03    TPro  6.0  /  Alb  2.3<L>  /  TBili  0.6  /  DBili  x   /  AST  98<H>  /  ALT  78<H>  /  AlkPhos  369<H>  10-03        Coronary angiogram (7/2022):  LM: mild atherosclerosis  LAD: mid 50% stenosis  D1: 60% stenosis  D2: 90% stenosis  LCx: 95% stenosis  RCA: mild atherosclerosis   LIMA-D2 graft: no disease  SVG-OM1: no disease    ERENDIRA (3/2023):  LVEF 25-30%  Reduced RV systolic function   Moderate AR, Moderate-severe TR, Bio-MVR  No obvious vegetations visualized  No pericardial effusion seen.     TTE (8/2023):  LVEF 53%  Mildly enlarged RV size with reduced systolic function  Severe biatrial enlargement  Mild-moderate TR, Mild-moderate AR  Bio-MVR  No obvious evidence of endocarditis. Consider ERENDIRA if clinically indicated.    TTE (10/1/23):   1. 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 Biatrial enlargement   4. Left ventricular ejection fraction, by visual estimation, is 45 to 50%.   5. Mild mitral annular calcification.   6. Mild-moderate tricuspid regurgitation.   7. Mild aortic valve stenosis.      ECG (9/30/23): atrial fibrillation, PVC  ECG (10/3/23): atrial fibrillation, old inferior infarct, ventricular couplet     JANEE ARCEO  [id number]      HPI:    Janee Arceo is a 78 year old man with past medical history of Coronary artery disease (s/p CABG), Bioprosthetic MVR, ANHAI closure with AtriClip in 2021, Cardiomyopathy, Atrial fibrillation, End stage renal disease with prior hospitalization in March at [facility] with pacemaker extraction secondary to MSSA bacteremia with replacement with Micra PPM, then recent hospitalization at [facility] in August for rectal bleeding now presented with fever and cough, found to have septic shock, currently admitted to ICU.       ALLERGIES:  levofloxacin (Unknown)  alfuzosin (Unknown)  tamsulosin (Unknown)  Myrbetriq (Unknown)      PAST MEDICAL & SURGICAL HISTORY:  Atrial fibrillation  CAD (coronary artery disease)  ESRD on dialysis  History of GI bleed  Mitral valve replaced    CURRENT MEDICATIONS:  acetaminophen     Tablet .. 650 milliGRAM(s) Oral every 6 hours PRN  albumin human 25% IVPB 50 milliLiter(s) IV Intermittent once  aluminum hydroxide/magnesium hydroxide/simethicone Suspension 10 milliLiter(s) Oral every 6 hours PRN  aspirin  chewable 81 milliGRAM(s) Oral daily  folic acid 1 milliGRAM(s) Oral daily  hydrocortisone hemorrhoidal Suppository 1 Suppository(s) Rectal two times a day  influenza  Vaccine (HIGH DOSE) 0.7 milliLiter(s) IntraMuscular once  levothyroxine 137 MICROGram(s) Oral daily  mesalamine Suppository 1000 milliGRAM(s) Rectal at bedtime  midodrine 10 milliGRAM(s) Oral every 8 hours  mupirocin 2% Nasal 1 Application(s) Both Nostrils two times a day  norepinephrine Infusion 0.05 MICROgram(s)/kG/Min IV Continuous <Continuous>  pantoprazole    Tablet 40 milliGRAM(s) Oral before breakfast  polyethylene glycol 3350 17 Gram(s) Oral daily  senna 2 Tablet(s) Oral at bedtime      ROS:  All 10 systems reviewed and positives noted in HPI    OBJECTIVE:    VITAL SIGNS:  Vital Signs Last 24 Hrs  T(C): 36.7 (03 Oct 2023 11:00), Max: 36.7 (02 Oct 2023 20:00)  T(F): 98 (03 Oct 2023 11:00), Max: 98.1 (02 Oct 2023 20:00)  HR: 95 (03 Oct 2023 13:30) (81 - 109)  BP: 115/79 (03 Oct 2023 13:30) (80/48 - 116/56)  BP(mean): 91 (03 Oct 2023 13:30) (58 - 91)  RR: 14 (03 Oct 2023 13:30) (10 - 25)  SpO2: 98% (03 Oct 2023 13:30) (96% - 100%)    Parameters below as of 03 Oct 2023 06:00  Patient On (Oxygen Delivery Method): nasal cannula  O2 Flow (L/min): 2      PHYSICAL EXAM:  General: elderly man, altered, chronically ill appearing  HEENT: sclera anicteric  Neck: supple  CVS: JVP ~ 7 cm H20, irregularly irregular, s1, s2  Chest: decreased breath sounds  Abdomen: non-distended  Extremities: no lower extremity edema b/l  Neuro: minimally awake      LABS:                        8.2    9.11  )-----------( 61       ( 03 Oct 2023 06:32 )             24.2     10-03    130<L>  |  98  |  84<H>  ----------------------------<  91  4.0   |  21<L>  |  5.09<H>    Ca    9.1      03 Oct 2023 06:32  Phos  2.3     10-03  Mg     2.2     10-03    TPro  6.0  /  Alb  2.3<L>  /  TBili  0.6  /  DBili  x   /  AST  98<H>  /  ALT  78<H>  /  AlkPhos  369<H>  10-03        Coronary angiogram (7/2022):  LM: mild atherosclerosis  LAD: mid 50% stenosis  D1: 60% stenosis  D2: 90% stenosis  LCx: 95% stenosis  RCA: mild atherosclerosis   LIMA-D2 graft: no disease  SVG-OM1: no disease    ERENDIRA (3/2023):  LVEF 25-30%  Reduced RV systolic function   Moderate AR, Moderate-severe TR, Bio-MVR  No obvious vegetations visualized  No pericardial effusion seen.     TTE (8/2023):  LVEF 53%  Mildly enlarged RV size with reduced systolic function  Severe biatrial enlargement  Mild-moderate TR, Mild-moderate AR  Bio-MVR  No obvious evidence of endocarditis. Consider ERENDIRA if clinically indicated.    TTE (10/1/23):   1. 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 Biatrial enlargement   4. Left ventricular ejection fraction, by visual estimation, is 45 to 50%.   5. Mild mitral annular calcification.   6. Mild-moderate tricuspid regurgitation.   7. Mild aortic valve stenosis.      ECG (9/30/23): atrial fibrillation, PVC  ECG (10/3/23): atrial fibrillation, old inferior infarct, ventricular couplet     JANEE ARCEO  [id number]      HPI:    Janee Arceo is a 78 year old man with past medical history of Coronary artery disease (s/p CABG), Bioprosthetic MVR, ANAHI closure with AtriClip in 2021, Cardiomyopathy, Atrial fibrillation, End stage renal disease with prior hospitalization in March at [facility] with pacemaker extraction secondary to MSSA bacteremia with replacement with Micra PPM, then recent hospitalization at [facility] in August for rectal bleeding now presented with fever and cough, found to have septic shock, currently admitted to ICU.       ALLERGIES:  levofloxacin (Unknown)  alfuzosin (Unknown)  tamsulosin (Unknown)  Myrbetriq (Unknown)      PAST MEDICAL & SURGICAL HISTORY:  Atrial fibrillation  CAD (coronary artery disease)  ESRD on dialysis  History of GI bleed  Mitral valve replaced    CURRENT MEDICATIONS:  acetaminophen     Tablet .. 650 milliGRAM(s) Oral every 6 hours PRN  albumin human 25% IVPB 50 milliLiter(s) IV Intermittent once  aluminum hydroxide/magnesium hydroxide/simethicone Suspension 10 milliLiter(s) Oral every 6 hours PRN  aspirin  chewable 81 milliGRAM(s) Oral daily  folic acid 1 milliGRAM(s) Oral daily  hydrocortisone hemorrhoidal Suppository 1 Suppository(s) Rectal two times a day  influenza  Vaccine (HIGH DOSE) 0.7 milliLiter(s) IntraMuscular once  levothyroxine 137 MICROGram(s) Oral daily  mesalamine Suppository 1000 milliGRAM(s) Rectal at bedtime  midodrine 10 milliGRAM(s) Oral every 8 hours  mupirocin 2% Nasal 1 Application(s) Both Nostrils two times a day  norepinephrine Infusion 0.05 MICROgram(s)/kG/Min IV Continuous <Continuous>  pantoprazole    Tablet 40 milliGRAM(s) Oral before breakfast  polyethylene glycol 3350 17 Gram(s) Oral daily  senna 2 Tablet(s) Oral at bedtime      ROS:  All 10 systems reviewed and positives noted in HPI    OBJECTIVE:    VITAL SIGNS:  Vital Signs Last 24 Hrs  T(C): 36.7 (03 Oct 2023 11:00), Max: 36.7 (02 Oct 2023 20:00)  T(F): 98 (03 Oct 2023 11:00), Max: 98.1 (02 Oct 2023 20:00)  HR: 95 (03 Oct 2023 13:30) (81 - 109)  BP: 115/79 (03 Oct 2023 13:30) (80/48 - 116/56)  BP(mean): 91 (03 Oct 2023 13:30) (58 - 91)  RR: 14 (03 Oct 2023 13:30) (10 - 25)  SpO2: 98% (03 Oct 2023 13:30) (96% - 100%)    Parameters below as of 03 Oct 2023 06:00  Patient On (Oxygen Delivery Method): nasal cannula  O2 Flow (L/min): 2      PHYSICAL EXAM:  General: elderly man, altered, chronically ill appearing  HEENT: sclera anicteric  Neck: supple  CVS: JVP ~ 7 cm H20, irregularly irregular, s1, s2  Chest: decreased breath sounds  Abdomen: non-distended  Extremities: no lower extremity edema b/l  Neuro: minimally awake      LABS:                        8.2    9.11  )-----------( 61       ( 03 Oct 2023 06:32 )             24.2     10-03    130<L>  |  98  |  84<H>  ----------------------------<  91  4.0   |  21<L>  |  5.09<H>    Ca    9.1      03 Oct 2023 06:32  Phos  2.3     10-03  Mg     2.2     10-03    TPro  6.0  /  Alb  2.3<L>  /  TBili  0.6  /  DBili  x   /  AST  98<H>  /  ALT  78<H>  /  AlkPhos  369<H>  10-03        Coronary angiogram (7/2022):  LM: mild atherosclerosis  LAD: mid 50% stenosis  D1: 60% stenosis  D2: 90% stenosis  LCx: 95% stenosis  RCA: mild atherosclerosis   LIMA-D2 graft: no disease  SVG-OM1: no disease    ERENDIRA (3/2023):  LVEF 25-30%  Reduced RV systolic function   Moderate AR, Moderate-severe TR, Bio-MVR  No obvious vegetations visualized  No pericardial effusion seen.     TTE (8/2023):  LVEF 53%  Mildly enlarged RV size with reduced systolic function  Severe biatrial enlargement  Mild-moderate TR, Mild-moderate AR  Bio-MVR  No obvious evidence of endocarditis. Consider ERENDIRA if clinically indicated.    TTE (10/1/23):   1. 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 Biatrial enlargement   4. Left ventricular ejection fraction, by visual estimation, is 45 to 50%.   5. Mild mitral annular calcification.   6. Mild-moderate tricuspid regurgitation.   7. Mild aortic valve stenosis.      ECG (9/30/23): atrial fibrillation, PVC  ECG (10/3/23): atrial fibrillation, old inferior infarct, ventricular couplet

## 2023-10-03 NOTE — PROGRESS NOTE ADULT - PROBLEM SELECTOR PLAN 1
Patient with thrombocytopenia clinically secondary to sepsis/BM suppression. Anemia associated with ESRD/chronic disease contributing. Platelet count currently stable. T/C PRBC for hemoglobin<7/related symptoms.

## 2023-10-03 NOTE — PROGRESS NOTE ADULT - SUBJECTIVE AND OBJECTIVE BOX
F/U Note:    78y Male PMH of ESRD on HD via tunneled catheter admitted with septic shock 2/2 to PNA and/or line sepsis (tunnelled HD catheter he arrived with).  -patient transferred to ICU for AMs and continued hypotension, required initiation of vasopressors    Interval Hx:  -MRSA bacteremia  -HD cath removed  -remains on vsopressors     Vital Signs Last 24 Hrs  T(C): 36.7 (03 Oct 2023 19:00), Max: 36.7 (02 Oct 2023 20:00)  T(F): 98 (03 Oct 2023 19:00), Max: 98.1 (02 Oct 2023 20:00)  HR: 95 (03 Oct 2023 19:50) (79 - 111)  BP: 112/76 (03 Oct 2023 19:50) (80/48 - 131/57)  BP(mean): 86 (03 Oct 2023 19:50) (58 - 91)  RR: 15 (03 Oct 2023 19:50) (10 - 25)  SpO2: 100% (03 Oct 2023 19:50) (96% - 100%)    Parameters below as of 03 Oct 2023 19:00  Patient On (Oxygen Delivery Method): nasal cannula  O2 Flow (L/min): 2                              8.2    9.11  )-----------( 61       ( 03 Oct 2023 06:32 )             24.2         10-03    130<L>  |  96  |  90<H>  ----------------------------<  99  3.9   |  20<L>  |  5.30<H>    Ca    9.3      03 Oct 2023 15:51  Phos  2.3     10-03  Mg     2.2     10-03    TPro  5.9<L>  /  Alb  2.5<L>  /  TBili  0.7  /  DBili  x   /  AST  86<H>  /  ALT  79<H>  /  AlkPhos  397<H>  10-03        NEURO: no headaches, blurry vision, tremors, depression, anxity  CV: no chest pain, palpitations, murmurs, orthopnea  Resp: no shortness of breath, cough, wheeze, sputum production  GI: no stomach pain,nausea, vomitting, flatulence, hematemesis, hematochezia  PV: no swelling of extremities, no hair loss, no coolness to extremities  ENDO: no polydypsia, polyphagia, polyuria, weight loss, night sweats          NEURO: awake and alert  CV: (+) S1/S2, rrr, no mrg  RESP: CTA b/l  GI: soft, non tender

## 2023-10-03 NOTE — CHART NOTE - NSCHARTNOTEFT_GEN_A_CORE
Nutrition Follow Up Note  Hospital Course (Per Electronic Medical Record):   Source: Medical Record [X] Nursing Staff [X]     Diet: Renal , Nepro BID    Patient lethargic , as per nursing consumed ~ 50% of breakfast meal , requires total assistance with feeding ,  Perma cath removed ? source of sepsis ,antibiotics as per ID  renal note reviewed , Patient is a HD patient labs reviewed , dx of moderate malnutrition , po Nepro supplement provided which is providing an additional 840kcals, 38gms protein     Current Weight: no follow up weight since admission (9/30)     Pertinent Medications: MEDICATIONS  (STANDING):  albumin human 25% IVPB 50 milliLiter(s) IV Intermittent every 6 hours  aspirin  chewable 81 milliGRAM(s) Oral daily  folic acid 1 milliGRAM(s) Oral daily  hydrocortisone hemorrhoidal Suppository 1 Suppository(s) Rectal two times a day  influenza  Vaccine (HIGH DOSE) 0.7 milliLiter(s) IntraMuscular once  levothyroxine 137 MICROGram(s) Oral daily  mesalamine Suppository 1000 milliGRAM(s) Rectal at bedtime  midodrine 10 milliGRAM(s) Oral every 8 hours  mupirocin 2% Nasal 1 Application(s) Both Nostrils two times a day  norepinephrine Infusion 0.05 MICROgram(s)/kG/Min (6.81 mL/Hr) IV Continuous <Continuous>  pantoprazole    Tablet 40 milliGRAM(s) Oral before breakfast  polyethylene glycol 3350 17 Gram(s) Oral daily  senna 2 Tablet(s) Oral at bedtime    MEDICATIONS  (PRN):  acetaminophen     Tablet .. 650 milliGRAM(s) Oral every 6 hours PRN Mild Pain (1 - 3)  aluminum hydroxide/magnesium hydroxide/simethicone Suspension 10 milliLiter(s) Oral every 6 hours PRN dyspepsia      Pertinent Labs:  10-03 Na130 mmol/L<L> Glu 91 mg/dL K+ 4.0 mmol/L Cr  5.09 mg/dL<H> BUN 84 mg/dL<H> 10-03 Phos 2.3 mg/dL<L> 10-03 Alb 2.3 g/dL<L>  Hgb 8.2g/dl<L> , Hct 24.2% <L>     Skin: Stage I coccyx,     Edema: none    Last BM: (10/2) multiple BM's reported as per nursing , Senna dose last provided (10/1)     Estimated Needs:   [X] No Change since Previous Assessment      Previous Nutrition Diagnosis: Moderate Protein Calorie   Malnutrition     Nutrition Diagnosis is [X] Ongoing & addressed          New Nutrition Diagnosis: [X] Not Applicable      Interventions:   1. continue current nutrition regimen        Monitoring & Evaluation: will monitor:  [X] Weights   [X] PO Intake   [X] Follow Up (Per Protocol)  [X] Tolerance to Diet Prescription       RD to follow as per Nutrition protocol  Haleigh Grant RD, CDN

## 2023-10-03 NOTE — CONSULT NOTE ADULT - CONSULT REQUESTED DATE/TIME
02-Oct-2023 11:14
02-Oct-2023 10:00
02-Oct-2023 08:03
01-Oct-2023 11:53
02-Oct-2023 08:16
03-Oct-2023 15:58

## 2023-10-03 NOTE — PROGRESS NOTE ADULT - SUBJECTIVE AND OBJECTIVE BOX
Follow-up Critical Care Progress Note  Chief Complaint : Pneumonia due to infectious organism    patient seen and examined  comfortable  remains on low dose pressors    to maintain BP      Allergies :levofloxacin (Unknown)  alfuzosin (Unknown)  tamsulosin (Unknown)  Myrbetriq (Unknown)      PAST MEDICAL & SURGICAL HISTORY:  Chronic atrial fibrillation  History of BPH  CAD (coronary artery disease)  Coronary stent patent  ESRD on dialysis  History of GI bleed  Mitral valve replaced      Medications:  MEDICATIONS  (STANDING):  aspirin  chewable 81 milliGRAM(s) Oral daily  folic acid 1 milliGRAM(s) Oral daily  hydrocortisone hemorrhoidal Suppository 1 Suppository(s) Rectal two times a day  influenza  Vaccine (HIGH DOSE) 0.7 milliLiter(s) IntraMuscular once  levothyroxine 137 MICROGram(s) Oral daily  mesalamine Suppository 1000 milliGRAM(s) Rectal at bedtime  midodrine 10 milliGRAM(s) Oral every 8 hours  norepinephrine Infusion 0.05 MICROgram(s)/kG/Min (6.81 mL/Hr) IV Continuous <Continuous>  pantoprazole    Tablet 40 milliGRAM(s) Oral before breakfast  polyethylene glycol 3350 17 Gram(s) Oral daily  senna 2 Tablet(s) Oral at bedtime    MEDICATIONS  (PRN):  acetaminophen     Tablet .. 650 milliGRAM(s) Oral every 6 hours PRN Mild Pain (1 - 3)  aluminum hydroxide/magnesium hydroxide/simethicone Suspension 10 milliLiter(s) Oral every 6 hours PRN dyspepsia      Antibiotics History  caspofungin IVPB    , 10-01-23 @ 08:49  caspofungin IVPB 50 milliGRAM(s) IV Intermittent every 24 hours, 10-02-23 @ 08:48  caspofungin IVPB 70 milliGRAM(s) IV Intermittent once, 10-01-23 @ 08:48  cefepime   IVPB 1000 milliGRAM(s) IV Intermittent once, 09-30-23 @ 19:12, Stop order after: 1 Doses  meropenem  IVPB 500 milliGRAM(s) IV Intermittent every 24 hours, 10-02-23 @ 09:02, Stop order after: 8 Days  meropenem  IVPB    , 10-01-23 @ 09:03  meropenem  IVPB 500 milliGRAM(s) IV Intermittent once, 10-01-23 @ 09:02, Stop order after: 1 Doses  piperacillin/tazobactam IVPB.. 3.375 Gram(s) IV Intermittent every 12 hours, 09-30-23 @ 22:40, Stop order after: 7 Days  vancomycin  IVPB    , 10-01-23 @ 09:00  vancomycin  IVPB 1000 milliGRAM(s) IV Intermittent once, 10-01-23 @ 09:00, Stop order after: 1 Doses  vancomycin  IVPB 1000 milliGRAM(s) IV Intermittent every 24 hours, 10-02-23 @ 09:00, Stop order after: 1 Doses  vancomycin  IVPB 1000 milliGRAM(s) IV Intermittent every 24 hours, 10-03-23 @ 00:00  vancomycin  IVPB. 1000 milliGRAM(s) IV Intermittent once, 09-30-23 @ 19:35, Stop order after: 1 Doses      Heme Medications   aspirin  chewable 81 milliGRAM(s) Oral daily, 10-01-23 @ 12:50      GI Medications  aluminum hydroxide/magnesium hydroxide/simethicone Suspension 10 milliLiter(s) Oral every 6 hours, 10-01-23 @ 13:00, Routine PRN  mesalamine Suppository 1000 milliGRAM(s) Rectal at bedtime, 10-01-23 @ 12:49, Routine  pantoprazole    Tablet 40 milliGRAM(s) Oral before breakfast, 10-01-23 @ 08:30, Routine  polyethylene glycol 3350 17 Gram(s) Oral daily, 10-01-23 @ 12:36, Routine  senna 2 Tablet(s) Oral at bedtime, 10-01-23 @ 12:36, Routine      COVID  10-01-23 @ 15:45  COVID -   NotDetec  09-30-23 @ 19:15  COVID -   NotDetec      COVID Biomarkers    10-02-23 @ 10:00 ESR --  ---  CRP --  ---  DDimer  --   ---   LDH --   ---   Ferritin 1029<H>    10-01-23 @ 09:40 ESR --  ---  CRP --  ---  DDimer  2066<H>   ---      ---   Ferritin --       WBC Trend  10-03-23 @ 06:32   -  9.11  10-02-23 @ 05:00   -  9.51  10-01-23 @ 09:40   -  10.77<H>  10-01-23 @ 05:30   -  8.07  09-30-23 @ 19:15   -  11.13<H>    H/H Trend  10-03-23 @ 06:32   -   8.2<L>/ 24.2<L>  10-02-23 @ 05:00   -   9.2<L>/ 27.9<L>  10-01-23 @ 09:40   -   10.4<L>/ 31.9<L>  10-01-23 @ 05:30   -   9.4<L>/ 29.3<L>  09-30-23 @ 19:15   -   9.4<L>/ 28.6<L>    Stool Occult Blood    Platelet Trend  10-03-23 @ 06:32   -  61<L>  10-02-23 @ 05:00   -  50<L>  10-01-23 @ 09:40   -  56<L>  10-01-23 @ 05:30   -  60<L>  09-30-23 @ 19:15   -  93<L>    Trend Sodium  10-03-23 @ 06:32   -  130<L>  10-02-23 @ 22:05   -  130<L>  10-02-23 @ 05:00   -  134<L>  10-01-23 @ 19:45   -  134<L>  10-01-23 @ 09:40   -  135  10-01-23 @ 05:30   -  136    Trend Potassium  10-03-23 @ 06:32   -  4.0  10-02-23 @ 22:05   -  4.3  10-02-23 @ 05:00   -  4.5  10-01-23 @ 19:45   -  4.1  10-01-23 @ 09:40   -  3.4<L>  10-01-23 @ 05:30   -  3.2<L>    Trend Bun/Cr  10-03-23 @ 06:32  BUN/CR -  84<H> / 5.09<H>  10-02-23 @ 22:05  BUN/CR -  75<H> / 4.92<H>  10-02-23 @ 05:00  BUN/CR -  63<H> / 4.45<H>  10-01-23 @ 19:45  BUN/CR -  60<H> / 4.06<H>  10-01-23 @ 09:40  BUN/CR -  55<H> / 3.89<H>  10-01-23 @ 05:30  BUN/CR -  48<H> / 3.42<H>    Lactic Acid Trend  10-01-23 @ 05:30   -   1.6  09-30-23 @ 19:15   -   1.1    ABG Trend    Trend AST/ALT/ALK Phos/Bili  10-03-23 @ 06:32   98<H>/78<H>/369<H>/0.6  10-01-23 @ 05:30   67<H>/63<H>/162<H>/0.8  09-30-23 @ 19:15   86<H>/74<H>/207<H>/0.8         Albumin Trend  10-03-23 @ 06:32   -   2.3<L>  10-01-23 @ 05:30   -   2.0<L>  09-30-23 @ 19:15   -   2.4<L>      PTT - PT - INR Trend  10-01-23 @ 09:40   -   34.6 - 21.7<H> - 1.89<H>  09-30-23 @ 19:15   -   35.6 - 29.8<H> - 2.69<H>    Glucose Trend  10-03-23 @ 06:32   -  91 -- --  10-02-23 @ 22:05   -  97 -- --  10-02-23 @ 05:00   -  88 -- --  10-01-23 @ 19:45   -  108<H> -- --  10-01-23 @ 09:40   -  101<H> -- --  10-01-23 @ 05:30   -  89 -- --  09-30-23 @ 19:15   -  113<H> -- --        LABS:                        8.2    9.11  )-----------( 61       ( 03 Oct 2023 06:32 )             24.2     10-03    130<L>  |  98  |  84<H>  ----------------------------<  91  4.0   |  21<L>  |  5.09<H>    Ca    9.1      03 Oct 2023 06:32  Phos  2.3     10-03  Mg     2.2     10-03    TPro  6.0  /  Alb  2.3<L>  /  TBili  0.6  /  DBili  x   /  AST  98<H>  /  ALT  78<H>  /  AlkPhos  369<H>  10-03         CULTURES: (if applicable)    Culture - Blood (collected 09-30-23 @ 19:15)  Source: .Blood Blood-Peripheral  Gram Stain (10-01-23 @ 11:24):    Growth in anaerobic bottle: Gram Positive Cocci in Clusters    Growth in aerobic bottle: Gram Positive Cocci in Clusters  Final Report (10-03-23 @ 07:40):    Growth in aerobic and anaerobic bottles: Methicillin Resistant    Staphylococcus aureus    See previous culture  [id number]    Culture - Blood (collected 09-30-23 @ 19:10)  Source: .Blood Blood-Peripheral  Gram Stain (10-01-23 @ 11:02):    Growth in anaerobic bottle: Gram Positive Cocci in Clusters    Growth in aerobic bottle: Gram Positive Cocci in Clusters  Final Report (10-03-23 @ 07:39):    Growth in aerobic and anaerobic bottles: Methicillin Resistant    Staphylococcus aureus    Direct identification is available within approximately 3-5    hours either by Blood Panel Multiplexed PCR or Direct    MALDI-TOF. Details: [email or web address]  Organism: Blood Culture PCR  Methicillin resistant Staphylococcus aureus (10-03-23 @ 07:39)  Organism: Methicillin resistant Staphylococcus aureus (10-03-23 @ 07:39)      Method Type: LEWIS      -  Ampicillin/Sulbactam: R 16/8      -  Cefazolin: R >16      -  Clindamycin: R >4      -  Daptomycin: S 1      -  Erythromycin: R >4      -  Gentamicin: S <=1 Should not be used as monotherapy      -  Linezolid: S 4      -  Oxacillin: R >2      -  Penicillin: R >8      -  Rifampin: S <=1 Should not be used as monotherapy      -  Tetracycline: S 2      -  Trimethoprim/Sulfamethoxazole: S <=0.5/9.5      -  Vancomycin: S 2  Organism: Blood Culture PCR (10-03-23 @ 07:39)      Method Type: PCR      -  Methicillin resistant Staphylococcus aureus (MRSA): Detec      Rapid RVP Result: NotDetec (10-01-23 @ 15:45)       RADIOLOGY  CXR:    Right sided effusion/infiltrate possible        VITALS:  T(C): 36.7 (10-02-23 @ 20:00), Max: 36.7 (10-02-23 @ 20:00)  T(F): 98.1 (10-02-23 @ 20:00), Max: 98.1 (10-02-23 @ 20:00)  HR: 89 (10-03-23 @ 07:30) (72 - 109)  BP: 108/52 (10-03-23 @ 07:30) (78/48 - 116/56)  BP(mean): 71 (10-03-23 @ 07:30) (58 - 84)  ABP: --  ABP(mean): --  RR: 19 (10-03-23 @ 07:30) (12 - 25)  SpO2: 99% (10-03-23 @ 07:30) (95% - 100%)  CVP(mm Hg): --  CVP(cm H2O): --    Ins and Outs     10-02-23 @ 07:01  -  10-03-23 @ 07:00  --------------------------------------------------------  IN: 364.9 mL / OUT: 0 mL / NET: 364.9 mL    10-03-23 @ 07:01  -  10-03-23 @ 09:43  --------------------------------------------------------  IN: 9.5 mL / OUT: 0 mL / NET: 9.5 mL        Height (cm): 172.7 (10-02-23 @ 13:03)  Weight (kg): 72.6 (10-02-23 @ 13:03)  BMI (kg/m2): 24.3 (10-02-23 @ 13:03)        I&O's Detail    02 Oct 2023 07:01  -  03 Oct 2023 07:00  --------------------------------------------------------  IN:    Albumin 5% - 50 mL: 50 mL    IV PiggyBack: 250 mL    IV PiggyBack: 50 mL    Norepinephrine: 2.7 mL    Norepinephrine: 12.2 mL  Total IN: 364.9 mL    OUT:  Total OUT: 0 mL    Total NET: 364.9 mL      03 Oct 2023 07:01  -  03 Oct 2023 09:43  --------------------------------------------------------  IN:    Norepinephrine: 9.5 mL  Total IN: 9.5 mL    OUT:  Total OUT: 0 mL    Total NET: 9.5 mL              Follow-up Critical Care Progress Note  Chief Complaint : Pneumonia due to infectious organism    patient seen and examined  comfortable  remains on low dose pressors    to maintain BP      Allergies :levofloxacin (Unknown)  alfuzosin (Unknown)  tamsulosin (Unknown)  Myrbetriq (Unknown)      PAST MEDICAL & SURGICAL HISTORY:  Chronic atrial fibrillation  History of BPH  CAD (coronary artery disease)  Coronary stent patent  ESRD on dialysis  History of GI bleed  Mitral valve replaced      Medications:  MEDICATIONS  (STANDING):  aspirin  chewable 81 milliGRAM(s) Oral daily  folic acid 1 milliGRAM(s) Oral daily  hydrocortisone hemorrhoidal Suppository 1 Suppository(s) Rectal two times a day  influenza  Vaccine (HIGH DOSE) 0.7 milliLiter(s) IntraMuscular once  levothyroxine 137 MICROGram(s) Oral daily  mesalamine Suppository 1000 milliGRAM(s) Rectal at bedtime  midodrine 10 milliGRAM(s) Oral every 8 hours  norepinephrine Infusion 0.05 MICROgram(s)/kG/Min (6.81 mL/Hr) IV Continuous <Continuous>  pantoprazole    Tablet 40 milliGRAM(s) Oral before breakfast  polyethylene glycol 3350 17 Gram(s) Oral daily  senna 2 Tablet(s) Oral at bedtime    MEDICATIONS  (PRN):  acetaminophen     Tablet .. 650 milliGRAM(s) Oral every 6 hours PRN Mild Pain (1 - 3)  aluminum hydroxide/magnesium hydroxide/simethicone Suspension 10 milliLiter(s) Oral every 6 hours PRN dyspepsia      Antibiotics History  caspofungin IVPB    , 10-01-23 @ 08:49  caspofungin IVPB 50 milliGRAM(s) IV Intermittent every 24 hours, 10-02-23 @ 08:48  caspofungin IVPB 70 milliGRAM(s) IV Intermittent once, 10-01-23 @ 08:48  cefepime   IVPB 1000 milliGRAM(s) IV Intermittent once, 09-30-23 @ 19:12, Stop order after: 1 Doses  meropenem  IVPB 500 milliGRAM(s) IV Intermittent every 24 hours, 10-02-23 @ 09:02, Stop order after: 8 Days  meropenem  IVPB    , 10-01-23 @ 09:03  meropenem  IVPB 500 milliGRAM(s) IV Intermittent once, 10-01-23 @ 09:02, Stop order after: 1 Doses  piperacillin/tazobactam IVPB.. 3.375 Gram(s) IV Intermittent every 12 hours, 09-30-23 @ 22:40, Stop order after: 7 Days  vancomycin  IVPB    , 10-01-23 @ 09:00  vancomycin  IVPB 1000 milliGRAM(s) IV Intermittent once, 10-01-23 @ 09:00, Stop order after: 1 Doses  vancomycin  IVPB 1000 milliGRAM(s) IV Intermittent every 24 hours, 10-02-23 @ 09:00, Stop order after: 1 Doses  vancomycin  IVPB 1000 milliGRAM(s) IV Intermittent every 24 hours, 10-03-23 @ 00:00  vancomycin  IVPB. 1000 milliGRAM(s) IV Intermittent once, 09-30-23 @ 19:35, Stop order after: 1 Doses      Heme Medications   aspirin  chewable 81 milliGRAM(s) Oral daily, 10-01-23 @ 12:50      GI Medications  aluminum hydroxide/magnesium hydroxide/simethicone Suspension 10 milliLiter(s) Oral every 6 hours, 10-01-23 @ 13:00, Routine PRN  mesalamine Suppository 1000 milliGRAM(s) Rectal at bedtime, 10-01-23 @ 12:49, Routine  pantoprazole    Tablet 40 milliGRAM(s) Oral before breakfast, 10-01-23 @ 08:30, Routine  polyethylene glycol 3350 17 Gram(s) Oral daily, 10-01-23 @ 12:36, Routine  senna 2 Tablet(s) Oral at bedtime, 10-01-23 @ 12:36, Routine      COVID  10-01-23 @ 15:45  COVID -   NotDetec  09-30-23 @ 19:15  COVID -   NotDetec      COVID Biomarkers    10-02-23 @ 10:00 ESR --  ---  CRP --  ---  DDimer  --   ---   LDH --   ---   Ferritin 1029<H>    10-01-23 @ 09:40 ESR --  ---  CRP --  ---  DDimer  2066<H>   ---      ---   Ferritin --       WBC Trend  10-03-23 @ 06:32   -  9.11  10-02-23 @ 05:00   -  9.51  10-01-23 @ 09:40   -  10.77<H>  10-01-23 @ 05:30   -  8.07  09-30-23 @ 19:15   -  11.13<H>    H/H Trend  10-03-23 @ 06:32   -   8.2<L>/ 24.2<L>  10-02-23 @ 05:00   -   9.2<L>/ 27.9<L>  10-01-23 @ 09:40   -   10.4<L>/ 31.9<L>  10-01-23 @ 05:30   -   9.4<L>/ 29.3<L>  09-30-23 @ 19:15   -   9.4<L>/ 28.6<L>    Stool Occult Blood    Platelet Trend  10-03-23 @ 06:32   -  61<L>  10-02-23 @ 05:00   -  50<L>  10-01-23 @ 09:40   -  56<L>  10-01-23 @ 05:30   -  60<L>  09-30-23 @ 19:15   -  93<L>    Trend Sodium  10-03-23 @ 06:32   -  130<L>  10-02-23 @ 22:05   -  130<L>  10-02-23 @ 05:00   -  134<L>  10-01-23 @ 19:45   -  134<L>  10-01-23 @ 09:40   -  135  10-01-23 @ 05:30   -  136    Trend Potassium  10-03-23 @ 06:32   -  4.0  10-02-23 @ 22:05   -  4.3  10-02-23 @ 05:00   -  4.5  10-01-23 @ 19:45   -  4.1  10-01-23 @ 09:40   -  3.4<L>  10-01-23 @ 05:30   -  3.2<L>    Trend Bun/Cr  10-03-23 @ 06:32  BUN/CR -  84<H> / 5.09<H>  10-02-23 @ 22:05  BUN/CR -  75<H> / 4.92<H>  10-02-23 @ 05:00  BUN/CR -  63<H> / 4.45<H>  10-01-23 @ 19:45  BUN/CR -  60<H> / 4.06<H>  10-01-23 @ 09:40  BUN/CR -  55<H> / 3.89<H>  10-01-23 @ 05:30  BUN/CR -  48<H> / 3.42<H>    Lactic Acid Trend  10-01-23 @ 05:30   -   1.6  09-30-23 @ 19:15   -   1.1    ABG Trend    Trend AST/ALT/ALK Phos/Bili  10-03-23 @ 06:32   98<H>/78<H>/369<H>/0.6  10-01-23 @ 05:30   67<H>/63<H>/162<H>/0.8  09-30-23 @ 19:15   86<H>/74<H>/207<H>/0.8         Albumin Trend  10-03-23 @ 06:32   -   2.3<L>  10-01-23 @ 05:30   -   2.0<L>  09-30-23 @ 19:15   -   2.4<L>      PTT - PT - INR Trend  10-01-23 @ 09:40   -   34.6 - 21.7<H> - 1.89<H>  09-30-23 @ 19:15   -   35.6 - 29.8<H> - 2.69<H>    Glucose Trend  10-03-23 @ 06:32   -  91 -- --  10-02-23 @ 22:05   -  97 -- --  10-02-23 @ 05:00   -  88 -- --  10-01-23 @ 19:45   -  108<H> -- --  10-01-23 @ 09:40   -  101<H> -- --  10-01-23 @ 05:30   -  89 -- --  09-30-23 @ 19:15   -  113<H> -- --        LABS:                        8.2    9.11  )-----------( 61       ( 03 Oct 2023 06:32 )             24.2     10-03    130<L>  |  98  |  84<H>  ----------------------------<  91  4.0   |  21<L>  |  5.09<H>    Ca    9.1      03 Oct 2023 06:32  Phos  2.3     10-03  Mg     2.2     10-03    TPro  6.0  /  Alb  2.3<L>  /  TBili  0.6  /  DBili  x   /  AST  98<H>  /  ALT  78<H>  /  AlkPhos  369<H>  10-03         CULTURES: (if applicable)    Culture - Blood (collected 09-30-23 @ 19:15)  Source: .Blood Blood-Peripheral  Gram Stain (10-01-23 @ 11:24):    Growth in anaerobic bottle: Gram Positive Cocci in Clusters    Growth in aerobic bottle: Gram Positive Cocci in Clusters  Final Report (10-03-23 @ 07:40):    Growth in aerobic and anaerobic bottles: Methicillin Resistant    Staphylococcus aureus    See previous culture  [id number]    Culture - Blood (collected 09-30-23 @ 19:10)  Source: .Blood Blood-Peripheral  Gram Stain (10-01-23 @ 11:02):    Growth in anaerobic bottle: Gram Positive Cocci in Clusters    Growth in aerobic bottle: Gram Positive Cocci in Clusters  Final Report (10-03-23 @ 07:39):    Growth in aerobic and anaerobic bottles: Methicillin Resistant    Staphylococcus aureus    Direct identification is available within approximately 3-5    hours either by Blood Panel Multiplexed PCR or Direct    MALDI-TOF. Details: [email or web address]  Organism: Blood Culture PCR  Methicillin resistant Staphylococcus aureus (10-03-23 @ 07:39)  Organism: Methicillin resistant Staphylococcus aureus (10-03-23 @ 07:39)      Method Type: LEWIS      -  Ampicillin/Sulbactam: R 16/8      -  Cefazolin: R >16      -  Clindamycin: R >4      -  Daptomycin: S 1      -  Erythromycin: R >4      -  Gentamicin: S <=1 Should not be used as monotherapy      -  Linezolid: S 4      -  Oxacillin: R >2      -  Penicillin: R >8      -  Rifampin: S <=1 Should not be used as monotherapy      -  Tetracycline: S 2      -  Trimethoprim/Sulfamethoxazole: S <=0.5/9.5      -  Vancomycin: S 2  Organism: Blood Culture PCR (10-03-23 @ 07:39)      Method Type: PCR      -  Methicillin resistant Staphylococcus aureus (MRSA): Detec      Rapid RVP Result: NotDetec (10-01-23 @ 15:45)       RADIOLOGY  CXR:    Right sided effusion/infiltrate possible        VITALS:  T(C): 36.7 (10-02-23 @ 20:00), Max: 36.7 (10-02-23 @ 20:00)  T(F): 98.1 (10-02-23 @ 20:00), Max: 98.1 (10-02-23 @ 20:00)  HR: 89 (10-03-23 @ 07:30) (72 - 109)  BP: 108/52 (10-03-23 @ 07:30) (78/48 - 116/56)  BP(mean): 71 (10-03-23 @ 07:30) (58 - 84)  ABP: --  ABP(mean): --  RR: 19 (10-03-23 @ 07:30) (12 - 25)  SpO2: 99% (10-03-23 @ 07:30) (95% - 100%)  CVP(mm Hg): --  CVP(cm H2O): --    Ins and Outs     10-02-23 @ 07:01  -  10-03-23 @ 07:00  --------------------------------------------------------  IN: 364.9 mL / OUT: 0 mL / NET: 364.9 mL    10-03-23 @ 07:01  -  10-03-23 @ 09:43  --------------------------------------------------------  IN: 9.5 mL / OUT: 0 mL / NET: 9.5 mL        Height (cm): 172.7 (10-02-23 @ 13:03)  Weight (kg): 72.6 (10-02-23 @ 13:03)  BMI (kg/m2): 24.3 (10-02-23 @ 13:03)        I&O's Detail    02 Oct 2023 07:01  -  03 Oct 2023 07:00  --------------------------------------------------------  IN:    Albumin 5% - 50 mL: 50 mL    IV PiggyBack: 250 mL    IV PiggyBack: 50 mL    Norepinephrine: 2.7 mL    Norepinephrine: 12.2 mL  Total IN: 364.9 mL    OUT:  Total OUT: 0 mL    Total NET: 364.9 mL      03 Oct 2023 07:01  -  03 Oct 2023 09:43  --------------------------------------------------------  IN:    Norepinephrine: 9.5 mL  Total IN: 9.5 mL    OUT:  Total OUT: 0 mL    Total NET: 9.5 mL              Follow-up Critical Care Progress Note  Chief Complaint : Pneumonia due to infectious organism    patient seen and examined  comfortable  remains on low dose pressors    to maintain BP      Allergies :levofloxacin (Unknown)  alfuzosin (Unknown)  tamsulosin (Unknown)  Myrbetriq (Unknown)      PAST MEDICAL & SURGICAL HISTORY:  Chronic atrial fibrillation  History of BPH  CAD (coronary artery disease)  Coronary stent patent  ESRD on dialysis  History of GI bleed  Mitral valve replaced      Medications:  MEDICATIONS  (STANDING):  aspirin  chewable 81 milliGRAM(s) Oral daily  folic acid 1 milliGRAM(s) Oral daily  hydrocortisone hemorrhoidal Suppository 1 Suppository(s) Rectal two times a day  influenza  Vaccine (HIGH DOSE) 0.7 milliLiter(s) IntraMuscular once  levothyroxine 137 MICROGram(s) Oral daily  mesalamine Suppository 1000 milliGRAM(s) Rectal at bedtime  midodrine 10 milliGRAM(s) Oral every 8 hours  norepinephrine Infusion 0.05 MICROgram(s)/kG/Min (6.81 mL/Hr) IV Continuous <Continuous>  pantoprazole    Tablet 40 milliGRAM(s) Oral before breakfast  polyethylene glycol 3350 17 Gram(s) Oral daily  senna 2 Tablet(s) Oral at bedtime    MEDICATIONS  (PRN):  acetaminophen     Tablet .. 650 milliGRAM(s) Oral every 6 hours PRN Mild Pain (1 - 3)  aluminum hydroxide/magnesium hydroxide/simethicone Suspension 10 milliLiter(s) Oral every 6 hours PRN dyspepsia      Antibiotics History  caspofungin IVPB    , 10-01-23 @ 08:49  caspofungin IVPB 50 milliGRAM(s) IV Intermittent every 24 hours, 10-02-23 @ 08:48  caspofungin IVPB 70 milliGRAM(s) IV Intermittent once, 10-01-23 @ 08:48  cefepime   IVPB 1000 milliGRAM(s) IV Intermittent once, 09-30-23 @ 19:12, Stop order after: 1 Doses  meropenem  IVPB 500 milliGRAM(s) IV Intermittent every 24 hours, 10-02-23 @ 09:02, Stop order after: 8 Days  meropenem  IVPB    , 10-01-23 @ 09:03  meropenem  IVPB 500 milliGRAM(s) IV Intermittent once, 10-01-23 @ 09:02, Stop order after: 1 Doses  piperacillin/tazobactam IVPB.. 3.375 Gram(s) IV Intermittent every 12 hours, 09-30-23 @ 22:40, Stop order after: 7 Days  vancomycin  IVPB    , 10-01-23 @ 09:00  vancomycin  IVPB 1000 milliGRAM(s) IV Intermittent once, 10-01-23 @ 09:00, Stop order after: 1 Doses  vancomycin  IVPB 1000 milliGRAM(s) IV Intermittent every 24 hours, 10-02-23 @ 09:00, Stop order after: 1 Doses  vancomycin  IVPB 1000 milliGRAM(s) IV Intermittent every 24 hours, 10-03-23 @ 00:00  vancomycin  IVPB. 1000 milliGRAM(s) IV Intermittent once, 09-30-23 @ 19:35, Stop order after: 1 Doses      Heme Medications   aspirin  chewable 81 milliGRAM(s) Oral daily, 10-01-23 @ 12:50      GI Medications  aluminum hydroxide/magnesium hydroxide/simethicone Suspension 10 milliLiter(s) Oral every 6 hours, 10-01-23 @ 13:00, Routine PRN  mesalamine Suppository 1000 milliGRAM(s) Rectal at bedtime, 10-01-23 @ 12:49, Routine  pantoprazole    Tablet 40 milliGRAM(s) Oral before breakfast, 10-01-23 @ 08:30, Routine  polyethylene glycol 3350 17 Gram(s) Oral daily, 10-01-23 @ 12:36, Routine  senna 2 Tablet(s) Oral at bedtime, 10-01-23 @ 12:36, Routine      COVID  10-01-23 @ 15:45  COVID -   NotDetec  09-30-23 @ 19:15  COVID -   NotDetec      COVID Biomarkers    10-02-23 @ 10:00 ESR --  ---  CRP --  ---  DDimer  --   ---   LDH --   ---   Ferritin 1029<H>    10-01-23 @ 09:40 ESR --  ---  CRP --  ---  DDimer  2066<H>   ---      ---   Ferritin --       WBC Trend  10-03-23 @ 06:32   -  9.11  10-02-23 @ 05:00   -  9.51  10-01-23 @ 09:40   -  10.77<H>  10-01-23 @ 05:30   -  8.07  09-30-23 @ 19:15   -  11.13<H>    H/H Trend  10-03-23 @ 06:32   -   8.2<L>/ 24.2<L>  10-02-23 @ 05:00   -   9.2<L>/ 27.9<L>  10-01-23 @ 09:40   -   10.4<L>/ 31.9<L>  10-01-23 @ 05:30   -   9.4<L>/ 29.3<L>  09-30-23 @ 19:15   -   9.4<L>/ 28.6<L>    Stool Occult Blood    Platelet Trend  10-03-23 @ 06:32   -  61<L>  10-02-23 @ 05:00   -  50<L>  10-01-23 @ 09:40   -  56<L>  10-01-23 @ 05:30   -  60<L>  09-30-23 @ 19:15   -  93<L>    Trend Sodium  10-03-23 @ 06:32   -  130<L>  10-02-23 @ 22:05   -  130<L>  10-02-23 @ 05:00   -  134<L>  10-01-23 @ 19:45   -  134<L>  10-01-23 @ 09:40   -  135  10-01-23 @ 05:30   -  136    Trend Potassium  10-03-23 @ 06:32   -  4.0  10-02-23 @ 22:05   -  4.3  10-02-23 @ 05:00   -  4.5  10-01-23 @ 19:45   -  4.1  10-01-23 @ 09:40   -  3.4<L>  10-01-23 @ 05:30   -  3.2<L>    Trend Bun/Cr  10-03-23 @ 06:32  BUN/CR -  84<H> / 5.09<H>  10-02-23 @ 22:05  BUN/CR -  75<H> / 4.92<H>  10-02-23 @ 05:00  BUN/CR -  63<H> / 4.45<H>  10-01-23 @ 19:45  BUN/CR -  60<H> / 4.06<H>  10-01-23 @ 09:40  BUN/CR -  55<H> / 3.89<H>  10-01-23 @ 05:30  BUN/CR -  48<H> / 3.42<H>    Lactic Acid Trend  10-01-23 @ 05:30   -   1.6  09-30-23 @ 19:15   -   1.1    ABG Trend    Trend AST/ALT/ALK Phos/Bili  10-03-23 @ 06:32   98<H>/78<H>/369<H>/0.6  10-01-23 @ 05:30   67<H>/63<H>/162<H>/0.8  09-30-23 @ 19:15   86<H>/74<H>/207<H>/0.8         Albumin Trend  10-03-23 @ 06:32   -   2.3<L>  10-01-23 @ 05:30   -   2.0<L>  09-30-23 @ 19:15   -   2.4<L>      PTT - PT - INR Trend  10-01-23 @ 09:40   -   34.6 - 21.7<H> - 1.89<H>  09-30-23 @ 19:15   -   35.6 - 29.8<H> - 2.69<H>    Glucose Trend  10-03-23 @ 06:32   -  91 -- --  10-02-23 @ 22:05   -  97 -- --  10-02-23 @ 05:00   -  88 -- --  10-01-23 @ 19:45   -  108<H> -- --  10-01-23 @ 09:40   -  101<H> -- --  10-01-23 @ 05:30   -  89 -- --  09-30-23 @ 19:15   -  113<H> -- --        LABS:                        8.2    9.11  )-----------( 61       ( 03 Oct 2023 06:32 )             24.2     10-03    130<L>  |  98  |  84<H>  ----------------------------<  91  4.0   |  21<L>  |  5.09<H>    Ca    9.1      03 Oct 2023 06:32  Phos  2.3     10-03  Mg     2.2     10-03    TPro  6.0  /  Alb  2.3<L>  /  TBili  0.6  /  DBili  x   /  AST  98<H>  /  ALT  78<H>  /  AlkPhos  369<H>  10-03         CULTURES: (if applicable)    Culture - Blood (collected 09-30-23 @ 19:15)  Source: .Blood Blood-Peripheral  Gram Stain (10-01-23 @ 11:24):    Growth in anaerobic bottle: Gram Positive Cocci in Clusters    Growth in aerobic bottle: Gram Positive Cocci in Clusters  Final Report (10-03-23 @ 07:40):    Growth in aerobic and anaerobic bottles: Methicillin Resistant    Staphylococcus aureus    See previous culture  [id number]    Culture - Blood (collected 09-30-23 @ 19:10)  Source: .Blood Blood-Peripheral  Gram Stain (10-01-23 @ 11:02):    Growth in anaerobic bottle: Gram Positive Cocci in Clusters    Growth in aerobic bottle: Gram Positive Cocci in Clusters  Final Report (10-03-23 @ 07:39):    Growth in aerobic and anaerobic bottles: Methicillin Resistant    Staphylococcus aureus    Direct identification is available within approximately 3-5    hours either by Blood Panel Multiplexed PCR or Direct    MALDI-TOF. Details: [email or web address]  Organism: Blood Culture PCR  Methicillin resistant Staphylococcus aureus (10-03-23 @ 07:39)  Organism: Methicillin resistant Staphylococcus aureus (10-03-23 @ 07:39)      Method Type: LEWIS      -  Ampicillin/Sulbactam: R 16/8      -  Cefazolin: R >16      -  Clindamycin: R >4      -  Daptomycin: S 1      -  Erythromycin: R >4      -  Gentamicin: S <=1 Should not be used as monotherapy      -  Linezolid: S 4      -  Oxacillin: R >2      -  Penicillin: R >8      -  Rifampin: S <=1 Should not be used as monotherapy      -  Tetracycline: S 2      -  Trimethoprim/Sulfamethoxazole: S <=0.5/9.5      -  Vancomycin: S 2  Organism: Blood Culture PCR (10-03-23 @ 07:39)      Method Type: PCR      -  Methicillin resistant Staphylococcus aureus (MRSA): Detec      Rapid RVP Result: NotDetec (10-01-23 @ 15:45)       RADIOLOGY  CXR:    Right sided effusion/infiltrate possible        VITALS:  T(C): 36.7 (10-02-23 @ 20:00), Max: 36.7 (10-02-23 @ 20:00)  T(F): 98.1 (10-02-23 @ 20:00), Max: 98.1 (10-02-23 @ 20:00)  HR: 89 (10-03-23 @ 07:30) (72 - 109)  BP: 108/52 (10-03-23 @ 07:30) (78/48 - 116/56)  BP(mean): 71 (10-03-23 @ 07:30) (58 - 84)  ABP: --  ABP(mean): --  RR: 19 (10-03-23 @ 07:30) (12 - 25)  SpO2: 99% (10-03-23 @ 07:30) (95% - 100%)  CVP(mm Hg): --  CVP(cm H2O): --    Ins and Outs     10-02-23 @ 07:01  -  10-03-23 @ 07:00  --------------------------------------------------------  IN: 364.9 mL / OUT: 0 mL / NET: 364.9 mL    10-03-23 @ 07:01  -  10-03-23 @ 09:43  --------------------------------------------------------  IN: 9.5 mL / OUT: 0 mL / NET: 9.5 mL        Height (cm): 172.7 (10-02-23 @ 13:03)  Weight (kg): 72.6 (10-02-23 @ 13:03)  BMI (kg/m2): 24.3 (10-02-23 @ 13:03)        I&O's Detail    02 Oct 2023 07:01  -  03 Oct 2023 07:00  --------------------------------------------------------  IN:    Albumin 5% - 50 mL: 50 mL    IV PiggyBack: 250 mL    IV PiggyBack: 50 mL    Norepinephrine: 2.7 mL    Norepinephrine: 12.2 mL  Total IN: 364.9 mL    OUT:  Total OUT: 0 mL    Total NET: 364.9 mL      03 Oct 2023 07:01  -  03 Oct 2023 09:43  --------------------------------------------------------  IN:    Norepinephrine: 9.5 mL  Total IN: 9.5 mL    OUT:  Total OUT: 0 mL    Total NET: 9.5 mL

## 2023-10-03 NOTE — PROGRESS NOTE ADULT - ASSESSMENT
77 yo m pmhx Afib on Eliquis, CAD, MVR, ESRD on HD via tunneled catheter admitted with septic shock 2/2 PNA and MRSA bacteremia s/p tunneled HD cath removal by vascular.    -F/u OR cx  -Titrate Levophed as able  -Repeat blood cx   -C/w Vancomycin  -Line holiday    Surgery   79 yo m pmhx Afib on Eliquis, CAD, MVR, ESRD on HD via tunneled catheter admitted with septic shock 2/2 PNA and MRSA bacteremia s/p tunneled HD cath removal by vascular.    -F/u OR cx  -Titrate Levophed as able  -Repeat blood cx   -C/w Vancomycin  -Line holiday    Surgery   77 yo m pmhx Afib on Eliquis, CAD, MVR, ESRD on HD via tunneled catheter admitted with septic shock 2/2 PNA and MRSA bacteremia s/p tunneled HD cath removal by vascular.    -F/u OR cx  -Titrate Levophed as able  -Sutures out POD 2  -Repeat blood cx   -C/w Vancomycin  -Line holiday    Surgery   77 yo m pmhx Afib on Eliquis, CAD, MVR, ESRD on HD via tunneled catheter admitted with septic shock 2/2 PNA and MRSA bacteremia s/p tunneled HD cath removal by vascular.    -F/u OR cx  -C/w Vancomycin  -Titrate Levophed as able  -Sutures out POD 2  -Repeat blood cx   -Line holiday, appreciate Nephrology recs in evaluating AVF maturity and HD schedule    Surgery   79 yo m pmhx Afib on Eliquis, CAD, MVR, ESRD on HD via tunneled catheter admitted with septic shock 2/2 PNA and MRSA bacteremia s/p tunneled HD cath removal by vascular.    -F/u OR cx  -C/w Vancomycin  -Titrate Levophed as able  -Sutures out POD 2  -Repeat blood cx   -Line holiday, appreciate Nephrology recs in evaluating AVF maturity and HD schedule    Surgery   77 yo m pmhx Afib on Eliquis, CAD, MVR, ESRD on HD via tunneled catheter admitted with septic shock 2/2 PNA and MRSA bacteremia s/p tunneled HD cath removal by vascular.    -F/u OR cx  -C/w Vancomycin  -Titrate Levophed as able  -Sutures out POD 2  -Repeat blood cx   -Line holiday, appreciate Nephrology recs in evaluating AVF maturity and HD schedule  -Restart Eliquis when able  -Appreciate excellent SICU care    Surgery

## 2023-10-03 NOTE — PROGRESS NOTE ADULT - SUBJECTIVE AND OBJECTIVE BOX
Surgery Progress Note    Subjective:     Patient seen and examined at bedside. POD 1. Levophed dose .07, up from .03 at time of procedure. Pending OR cx of tip of catheter. Patient continues on Vancomycin. WBC 9    OBJECTIVE:     T(C): 36.7 (10-02-23 @ 20:00), Max: 36.7 (10-02-23 @ 20:00)  HR: 89 (10-03-23 @ 07:30) (71 - 109)  BP: 108/52 (10-03-23 @ 07:30) (78/48 - 116/56)  RR: 19 (10-03-23 @ 07:30) (11 - 25)  SpO2: 99% (10-03-23 @ 07:30) (95% - 100%)  Wt(kg): --    I&O's Detail    02 Oct 2023 07:01  -  03 Oct 2023 07:00  --------------------------------------------------------  IN:    Albumin 5% - 50 mL: 50 mL    IV PiggyBack: 250 mL    IV PiggyBack: 50 mL    Norepinephrine: 2.7 mL    Norepinephrine: 12.2 mL  Total IN: 364.9 mL    OUT:  Total OUT: 0 mL    Total NET: 364.9 mL      03 Oct 2023 07:01  -  03 Oct 2023 08:05  --------------------------------------------------------  IN:    Norepinephrine: 9.5 mL  Total IN: 9.5 mL    OUT:  Total OUT: 0 mL    Total NET: 9.5 mL          PHYSICAL EXAM:    GENERAL: Mildly responsive, lying in bed comfortably  Abdomen- Soft, non tender  Chest- Permacath port site near clavicle c/d/i, prolene stitches x 3    MEDICATIONS  (STANDING):  aspirin  chewable 81 milliGRAM(s) Oral daily  folic acid 1 milliGRAM(s) Oral daily  hydrocortisone hemorrhoidal Suppository 1 Suppository(s) Rectal two times a day  influenza  Vaccine (HIGH DOSE) 0.7 milliLiter(s) IntraMuscular once  levothyroxine 137 MICROGram(s) Oral daily  mesalamine Suppository 1000 milliGRAM(s) Rectal at bedtime  midodrine 10 milliGRAM(s) Oral every 8 hours  norepinephrine Infusion 0.05 MICROgram(s)/kG/Min (6.81 mL/Hr) IV Continuous <Continuous>  pantoprazole    Tablet 40 milliGRAM(s) Oral before breakfast  polyethylene glycol 3350 17 Gram(s) Oral daily  senna 2 Tablet(s) Oral at bedtime  vancomycin  IVPB 1000 milliGRAM(s) IV Intermittent every 24 hours    MEDICATIONS  (PRN):  acetaminophen     Tablet .. 650 milliGRAM(s) Oral every 6 hours PRN Mild Pain (1 - 3)  aluminum hydroxide/magnesium hydroxide/simethicone Suspension 10 milliLiter(s) Oral every 6 hours PRN dyspepsia      LABS:                          8.2    9.11  )-----------( 61       ( 03 Oct 2023 06:32 )             24.2     10-03    130<L>  |  98  |  84<H>  ----------------------------<  91  4.0   |  21<L>  |  5.09<H>    Ca    9.1      03 Oct 2023 06:32  Phos  2.3     10-03  Mg     2.2     10-03    TPro  6.0  /  Alb  2.3<L>  /  TBili  0.6  /  DBili  x   /  AST  98<H>  /  ALT  78<H>  /  AlkPhos  369<H>  10-03    PT/INR - ( 01 Oct 2023 09:40 )   PT: 21.7 sec;   INR: 1.89 ratio         PTT - ( 01 Oct 2023 09:40 )  PTT:34.6 sec           Surgery Progress Note    Subjective:     Patient seen and examined at bedside. POD 1. Dressing changed at bedside. Levophed dose .07, up from .03 at time of procedure. Pending OR cx of tip of catheter. Patient continues on Vancomycin. WBC 9    OBJECTIVE:     T(C): 36.7 (10-02-23 @ 20:00), Max: 36.7 (10-02-23 @ 20:00)  HR: 89 (10-03-23 @ 07:30) (71 - 109)  BP: 108/52 (10-03-23 @ 07:30) (78/48 - 116/56)  RR: 19 (10-03-23 @ 07:30) (11 - 25)  SpO2: 99% (10-03-23 @ 07:30) (95% - 100%)  Wt(kg): --    I&O's Detail    02 Oct 2023 07:01  -  03 Oct 2023 07:00  --------------------------------------------------------  IN:    Albumin 5% - 50 mL: 50 mL    IV PiggyBack: 250 mL    IV PiggyBack: 50 mL    Norepinephrine: 2.7 mL    Norepinephrine: 12.2 mL  Total IN: 364.9 mL    OUT:  Total OUT: 0 mL    Total NET: 364.9 mL      03 Oct 2023 07:01  -  03 Oct 2023 08:05  --------------------------------------------------------  IN:    Norepinephrine: 9.5 mL  Total IN: 9.5 mL    OUT:  Total OUT: 0 mL    Total NET: 9.5 mL          PHYSICAL EXAM:    GENERAL: Mildly responsive, lying in bed comfortably  Abdomen- Soft, non tender  Chest- Permacath port site near clavicle c/d/i, prolene stitches x 3    MEDICATIONS  (STANDING):  aspirin  chewable 81 milliGRAM(s) Oral daily  folic acid 1 milliGRAM(s) Oral daily  hydrocortisone hemorrhoidal Suppository 1 Suppository(s) Rectal two times a day  influenza  Vaccine (HIGH DOSE) 0.7 milliLiter(s) IntraMuscular once  levothyroxine 137 MICROGram(s) Oral daily  mesalamine Suppository 1000 milliGRAM(s) Rectal at bedtime  midodrine 10 milliGRAM(s) Oral every 8 hours  norepinephrine Infusion 0.05 MICROgram(s)/kG/Min (6.81 mL/Hr) IV Continuous <Continuous>  pantoprazole    Tablet 40 milliGRAM(s) Oral before breakfast  polyethylene glycol 3350 17 Gram(s) Oral daily  senna 2 Tablet(s) Oral at bedtime  vancomycin  IVPB 1000 milliGRAM(s) IV Intermittent every 24 hours    MEDICATIONS  (PRN):  acetaminophen     Tablet .. 650 milliGRAM(s) Oral every 6 hours PRN Mild Pain (1 - 3)  aluminum hydroxide/magnesium hydroxide/simethicone Suspension 10 milliLiter(s) Oral every 6 hours PRN dyspepsia      LABS:                          8.2    9.11  )-----------( 61       ( 03 Oct 2023 06:32 )             24.2     10-03    130<L>  |  98  |  84<H>  ----------------------------<  91  4.0   |  21<L>  |  5.09<H>    Ca    9.1      03 Oct 2023 06:32  Phos  2.3     10-03  Mg     2.2     10-03    TPro  6.0  /  Alb  2.3<L>  /  TBili  0.6  /  DBili  x   /  AST  98<H>  /  ALT  78<H>  /  AlkPhos  369<H>  10-03    PT/INR - ( 01 Oct 2023 09:40 )   PT: 21.7 sec;   INR: 1.89 ratio         PTT - ( 01 Oct 2023 09:40 )  PTT:34.6 sec           Surgery Progress Note    Subjective:     Patient seen and examined at bedside. POD 1. Dressing changed at bedside. Levophed dose .07, up from .03 at time of procedure. Pending OR cx of tip of catheter. Patient continues on Vancomycin. WBC 9    OBJECTIVE:     T(C): 36.7 (10-02-23 @ 20:00), Max: 36.7 (10-02-23 @ 20:00)  HR: 89 (10-03-23 @ 07:30) (71 - 109)  BP: 108/52 (10-03-23 @ 07:30) (78/48 - 116/56)  RR: 19 (10-03-23 @ 07:30) (11 - 25)  SpO2: 99% (10-03-23 @ 07:30) (95% - 100%)  Wt(kg): --    I&O's Detail    02 Oct 2023 07:01  -  03 Oct 2023 07:00  --------------------------------------------------------  IN:    Albumin 5% - 50 mL: 50 mL    IV PiggyBack: 250 mL    IV PiggyBack: 50 mL    Norepinephrine: 2.7 mL    Norepinephrine: 12.2 mL  Total IN: 364.9 mL    OUT:  Total OUT: 0 mL    Total NET: 364.9 mL      03 Oct 2023 07:01  -  03 Oct 2023 08:05  --------------------------------------------------------  IN:    Norepinephrine: 9.5 mL  Total IN: 9.5 mL    OUT:  Total OUT: 0 mL    Total NET: 9.5 mL          PHYSICAL EXAM:    GENERAL: Mildly responsive, lying in bed comfortably  Abdomen- Soft, non tender  Chest- Permacath port site near clavicle c/d/i, prolene stitches x 3  Extremities- L arm AVF    MEDICATIONS  (STANDING):  aspirin  chewable 81 milliGRAM(s) Oral daily  folic acid 1 milliGRAM(s) Oral daily  hydrocortisone hemorrhoidal Suppository 1 Suppository(s) Rectal two times a day  influenza  Vaccine (HIGH DOSE) 0.7 milliLiter(s) IntraMuscular once  levothyroxine 137 MICROGram(s) Oral daily  mesalamine Suppository 1000 milliGRAM(s) Rectal at bedtime  midodrine 10 milliGRAM(s) Oral every 8 hours  norepinephrine Infusion 0.05 MICROgram(s)/kG/Min (6.81 mL/Hr) IV Continuous <Continuous>  pantoprazole    Tablet 40 milliGRAM(s) Oral before breakfast  polyethylene glycol 3350 17 Gram(s) Oral daily  senna 2 Tablet(s) Oral at bedtime  vancomycin  IVPB 1000 milliGRAM(s) IV Intermittent every 24 hours    MEDICATIONS  (PRN):  acetaminophen     Tablet .. 650 milliGRAM(s) Oral every 6 hours PRN Mild Pain (1 - 3)  aluminum hydroxide/magnesium hydroxide/simethicone Suspension 10 milliLiter(s) Oral every 6 hours PRN dyspepsia      LABS:                          8.2    9.11  )-----------( 61       ( 03 Oct 2023 06:32 )             24.2     10-03    130<L>  |  98  |  84<H>  ----------------------------<  91  4.0   |  21<L>  |  5.09<H>    Ca    9.1      03 Oct 2023 06:32  Phos  2.3     10-03  Mg     2.2     10-03    TPro  6.0  /  Alb  2.3<L>  /  TBili  0.6  /  DBili  x   /  AST  98<H>  /  ALT  78<H>  /  AlkPhos  369<H>  10-03    PT/INR - ( 01 Oct 2023 09:40 )   PT: 21.7 sec;   INR: 1.89 ratio         PTT - ( 01 Oct 2023 09:40 )  PTT:34.6 sec

## 2023-10-03 NOTE — CONSULT NOTE ADULT - ASSESSMENT
Assessment:  Liam Garza is a 78 year old man with past medical history of Coronary artery disease (s/p CABG), Bioprosthetic MVR, ANAHI closure with AtriClip in 2021, Cardiomyopathy, Atrial fibrillation, End stage renal disease with prior hospitalization in March at [facility] with pacemaker extraction secondary to MSSA bacteremia with replacement with Micra PPM, then recent hospitalization at [facility] in August for rectal bleeding now presented with fever and cough, found to have septic shock, requiring pressor support, currently admitted to ICU.    Cardiology consulted to evaluate for endocarditis. ECG consistent with atrial fibrillation and old inferior infarct, no acute ischemic ST abnormalities. Echo report consistent with LVEF 45-50%, mild-moderate tricuspid regurgitation and mild aortic valve stenosis. The patient was evaluated at bedside but is non-verbal and appears encephalopathic.     Recommendations:  [] Septic shock with gram positive bacteremia: Now s/p removal of tunneled dialysis catheter. Infectious workup per ID, Chest X ray consistent with right lung pneumonia. ERENDIRA appears to be contraindicated at this time due to altered mental status/encephalopathic, hemodynamic instability with shock on pressor support (SBP 80s at bedside) and severe thrombocytopenia with high risk for traumatic bleeding with ERENDIRA probe insertion. Would discuss with ID if ERENDIRA will definitively  if so, then can re-evaluate with patient is no longer in septic shock and if thrombocytopenia resolves, otherwise recommend goals of care discussion with patient's family as prognosis appears guarded. Continue IV antibiotics per ID. Will review echo images.    Discussed with ICU team. We will continue to follow along.    Thu Bass MD  Cardiology    Assessment:  Liam Garza is a 78 year old man with past medical history of Coronary artery disease (s/p CABG), Bioprosthetic MVR, ANAHI closure with AtriClip in 2021, Cardiomyopathy, Atrial fibrillation, End stage renal disease with prior hospitalization in March at [facility] with pacemaker extraction secondary to MSSA bacteremia with replacement with Micra PPM, then recent hospitalization at [facility] in August for rectal bleeding now presented with fever and cough, found to have septic shock, requiring pressor support, currently admitted to ICU.    Cardiology consulted to evaluate for endocarditis. ECG consistent with atrial fibrillation and old inferior infarct, no acute ischemic ST abnormalities. Echo report consistent with LVEF 45-50%, mild-moderate tricuspid regurgitation and mild aortic valve stenosis. The patient was evaluated at bedside but is non-verbal and appears encephalopathic.     Recommendations:  [] Septic shock with gram positive bacteremia: Now s/p removal of tunneled dialysis catheter. Infectious workup per ID, Chest X ray consistent with right lung pneumonia. ERENDIRA appears to be contraindicated at this time due to altered mental status/encephalopathic, hemodynamic instability with shock on pressor support (SBP 80s at bedside) and severe thrombocytopenia with high risk for traumatic bleeding with ERENDIRA probe insertion. Would discuss with ID if ERENDIRA will definitively  if so, then can re-evaluate with patient is no longer in septic shock and if thrombocytopenia resolves, otherwise recommend goals of care discussion with patient's family as prognosis appears guarded. Continue IV antibiotics per ID. Will review echo images.    Discussed with ICU team. We will continue to follow along.    Thu Bass MD  Cardiology    Assessment:  Liam Garza is a 78 year old man with past medical history of Coronary artery disease (s/p CABG), Bioprosthetic MVR, ANAHI closure with AtriClip in 2021, Cardiomyopathy, Atrial fibrillation, End stage renal disease with prior hospitalization in March at [facility] with pacemaker extraction secondary to MSSA bacteremia with replacement with Micra PPM, then recent hospitalization at [facility] in August for rectal bleeding now presented with fever and cough, found to have septic shock, requiring pressor support, currently admitted to ICU.    Cardiology consulted to evaluate for endocarditis. ECG consistent with atrial fibrillation and old inferior infarct, no acute ischemic ST abnormalities. Echo report consistent with LVEF 45-50%, mild-moderate tricuspid regurgitation and mild aortic valve stenosis. The patient was evaluated at bedside but is non-verbal and appears encephalopathic.     Recommendations:  [] Septic shock with gram positive bacteremia: Now s/p removal of tunneled dialysis catheter. Infectious workup per ID, Chest X ray consistent with right lung pneumonia. ERENDIRA appears to be contraindicated at this time due to altered mental status/encephalopathic, hemodynamic instability with shock on pressor support (SBP 80s at bedside) and severe thrombocytopenia with high risk for traumatic bleeding with ERENDIRA probe insertion. Would discuss with ID if ERENDIRA will definitively  if so, then can re-evaluate with patient is no longer in septic shock and if thrombocytopenia resolves, otherwise recommend goals of care discussion with patient's family as prognosis appears guarded. Continue IV antibiotics per ID. Will review echo images.    Discussed with ICU team. We will continue to follow along.    Thu Bass MD  Cardiology

## 2023-10-03 NOTE — PROGRESS NOTE ADULT - NUTRITIONAL ASSESSMENT
This patient has been assessed with a concern for Malnutrition and has been determined to have a diagnosis/diagnoses of Moderate protein-calorie malnutrition.    This patient is being managed with:   Diet Renal Restrictions-  For patients receiving Renal Replacement - No Protein Restr No Conc K No Conc Phos Low Sodium  Supplement Feeding Modality:  Oral  Nepro Cans or Servings Per Day:  1       Frequency:  Two Times a day  Entered: Oct  2 2023  9:42AM

## 2023-10-03 NOTE — CONSULT NOTE ADULT - CONSULT REASON
MRSA bacteremia
ESRD
Evaluate for endocarditis
"infected permacath"
thrombocytopenia
goc assist /sepsis/ams

## 2023-10-03 NOTE — PROGRESS NOTE ADULT - ASSESSMENT
78y Male PMH of ESRD on HD via tunneled catheter admitted with septic shock 2/2 to PNA and/or line sepsis (tunnelled HD catheter he arrived with).  -patient transferred to ICU for AMs and continued hypotension, required initiation of vasopressors    Interval Hx:  -MRSA bacteremia  -HD cath removed  -remains on vsopressors           PLAN:    30 cc/kg fluid challenge without improvement in blood pressure  -patient currently on Levophed, will titrate for MAP 65-70  -patient started on midodrine, has helped lwoer pressor need will continue until off pressors  -if remains on pressors by Am would consider starting stress dose steroids   -repeat lactate  -MRSA bacteremia noted, will dose vanco by lei westfall if level <20 in Am will give 1 gram of IVPB vanco as per ID note   -had TTE, no vegitations noted   -follow cultures and narrow antibitoics as able  -may need temp. HD cath palced tomorrow for HD session  -had some runs of v-tach, currently sinus rhythm, cards is following no acute interventions planned. possible ERENDIRA if sepsis resolved  -require dpaltelet transfusion for thrombocytopenia, PLt now >60K  -goal UOP >0.5 cc/kg/hr         CRITICAL CARE TIME SPENT:  45 minutes of critical care time spent providing medical care for patient's acute illness/conditions that impairs at least one vital organ system and/or poses a high risk of imminent or life threatening deterioration in the patient's condition. It includes time spent evaluating and treating the patient's acute illness as well as time spent reviewing labs, radiology, discussing goals of care with patient and/or patient's family, and discussing the case with a multidisciplinary team, including the eICU, in an effort to prevent further life threatening deterioration or end organ damage. This time is independent of any procedures performed.

## 2023-10-04 LAB
ANION GAP SERPL CALC-SCNC: 9 MMOL/L — SIGNIFICANT CHANGE UP (ref 5–17)
BASOPHILS # BLD AUTO: 0.08 K/UL — SIGNIFICANT CHANGE UP (ref 0–0.2)
BASOPHILS NFR BLD AUTO: 0.5 % — SIGNIFICANT CHANGE UP (ref 0–2)
BUN SERPL-MCNC: 91 MG/DL — HIGH (ref 7–23)
CALCIUM SERPL-MCNC: 9.1 MG/DL — SIGNIFICANT CHANGE UP (ref 8.4–10.5)
CHLORIDE SERPL-SCNC: 99 MMOL/L — SIGNIFICANT CHANGE UP (ref 96–108)
CO2 SERPL-SCNC: 22 MMOL/L — SIGNIFICANT CHANGE UP (ref 22–31)
CREAT SERPL-MCNC: 5.53 MG/DL — HIGH (ref 0.5–1.3)
EGFR: 10 ML/MIN/1.73M2 — LOW
EOSINOPHIL # BLD AUTO: 0.18 K/UL — SIGNIFICANT CHANGE UP (ref 0–0.5)
EOSINOPHIL NFR BLD AUTO: 1.1 % — SIGNIFICANT CHANGE UP (ref 0–6)
GLUCOSE SERPL-MCNC: 94 MG/DL — SIGNIFICANT CHANGE UP (ref 70–99)
HCT VFR BLD CALC: 25.9 % — LOW (ref 39–50)
HCT VFR BLD CALC: 26.6 % — LOW (ref 39–50)
HGB BLD-MCNC: 8.7 G/DL — LOW (ref 13–17)
HGB BLD-MCNC: 9 G/DL — LOW (ref 13–17)
IMM GRANULOCYTES NFR BLD AUTO: 1 % — HIGH (ref 0–0.9)
LYMPHOCYTES # BLD AUTO: 0.78 K/UL — LOW (ref 1–3.3)
LYMPHOCYTES # BLD AUTO: 5 % — LOW (ref 13–44)
MAGNESIUM SERPL-MCNC: 1.9 MG/DL — SIGNIFICANT CHANGE UP (ref 1.6–2.6)
MCHC RBC-ENTMCNC: 30.3 PG — SIGNIFICANT CHANGE UP (ref 27–34)
MCHC RBC-ENTMCNC: 33.6 GM/DL — SIGNIFICANT CHANGE UP (ref 32–36)
MCHC RBC-ENTMCNC: 33.8 GM/DL — SIGNIFICANT CHANGE UP (ref 32–36)
MCV RBC AUTO: 89.6 FL — SIGNIFICANT CHANGE UP (ref 80–100)
MCV RBC AUTO: 90.2 FL — SIGNIFICANT CHANGE UP (ref 80–100)
MONOCYTES # BLD AUTO: 1.42 K/UL — HIGH (ref 0–0.9)
MONOCYTES NFR BLD AUTO: 9 % — SIGNIFICANT CHANGE UP (ref 2–14)
NEUTROPHILS # BLD AUTO: 13.1 K/UL — HIGH (ref 1.8–7.4)
NEUTROPHILS NFR BLD AUTO: 83.4 % — HIGH (ref 43–77)
NRBC # BLD: 0 /100 WBCS — SIGNIFICANT CHANGE UP (ref 0–0)
PHOSPHATE SERPL-MCNC: 2.1 MG/DL — LOW (ref 2.5–4.5)
PLATELET # BLD AUTO: 62 K/UL — LOW (ref 150–400)
POTASSIUM SERPL-MCNC: 3.8 MMOL/L — SIGNIFICANT CHANGE UP (ref 3.5–5.3)
POTASSIUM SERPL-SCNC: 3.8 MMOL/L — SIGNIFICANT CHANGE UP (ref 3.5–5.3)
RBC # BLD: 2.87 M/UL — LOW (ref 4.2–5.8)
RBC # BLD: 2.97 M/UL — LOW (ref 4.2–5.8)
RBC # FLD: 15.9 % — HIGH (ref 10.3–14.5)
SODIUM SERPL-SCNC: 130 MMOL/L — LOW (ref 135–145)
SURGICAL PATHOLOGY STUDY: SIGNIFICANT CHANGE UP
VANCOMYCIN FLD-MCNC: 19 UG/ML — SIGNIFICANT CHANGE UP
WBC # BLD: 12.8 K/UL — HIGH (ref 3.8–10.5)
WBC # BLD: 15.72 K/UL — HIGH (ref 3.8–10.5)
WBC # FLD AUTO: 12.8 K/UL — HIGH (ref 3.8–10.5)
WBC # FLD AUTO: 15.72 K/UL — HIGH (ref 3.8–10.5)

## 2023-10-04 PROCEDURE — 99232 SBSQ HOSP IP/OBS MODERATE 35: CPT

## 2023-10-04 PROCEDURE — 70450 CT HEAD/BRAIN W/O DYE: CPT | Mod: 26

## 2023-10-04 PROCEDURE — 71045 X-RAY EXAM CHEST 1 VIEW: CPT | Mod: 26

## 2023-10-04 RX ORDER — ERYTHROPOIETIN 10000 [IU]/ML
10000 INJECTION, SOLUTION INTRAVENOUS; SUBCUTANEOUS
Refills: 0 | Status: DISCONTINUED | OUTPATIENT
Start: 2023-10-04 | End: 2023-10-14

## 2023-10-04 RX ORDER — VANCOMYCIN HCL 1 G
1000 VIAL (EA) INTRAVENOUS ONCE
Refills: 0 | Status: COMPLETED | OUTPATIENT
Start: 2023-10-04 | End: 2023-10-04

## 2023-10-04 RX ADMIN — POLYETHYLENE GLYCOL 3350 17 GRAM(S): 17 POWDER, FOR SOLUTION ORAL at 11:29

## 2023-10-04 RX ADMIN — MUPIROCIN 1 APPLICATION(S): 20 OINTMENT TOPICAL at 06:17

## 2023-10-04 RX ADMIN — ERYTHROPOIETIN 10000 UNIT(S): 10000 INJECTION, SOLUTION INTRAVENOUS; SUBCUTANEOUS at 16:23

## 2023-10-04 RX ADMIN — MIDODRINE HYDROCHLORIDE 10 MILLIGRAM(S): 2.5 TABLET ORAL at 13:05

## 2023-10-04 RX ADMIN — Medication 137 MICROGRAM(S): at 06:14

## 2023-10-04 RX ADMIN — PANTOPRAZOLE SODIUM 40 MILLIGRAM(S): 20 TABLET, DELAYED RELEASE ORAL at 06:14

## 2023-10-04 RX ADMIN — Medication 1 MILLIGRAM(S): at 11:30

## 2023-10-04 RX ADMIN — SENNA PLUS 2 TABLET(S): 8.6 TABLET ORAL at 21:23

## 2023-10-04 RX ADMIN — Medication 1 SUPPOSITORY(S): at 06:13

## 2023-10-04 RX ADMIN — Medication 1 SUPPOSITORY(S): at 17:05

## 2023-10-04 RX ADMIN — MUPIROCIN 1 APPLICATION(S): 20 OINTMENT TOPICAL at 17:04

## 2023-10-04 RX ADMIN — MIDODRINE HYDROCHLORIDE 10 MILLIGRAM(S): 2.5 TABLET ORAL at 21:22

## 2023-10-04 RX ADMIN — Medication 250 MILLIGRAM(S): at 18:36

## 2023-10-04 RX ADMIN — Medication 1000 MILLIGRAM(S): at 21:23

## 2023-10-04 RX ADMIN — Medication 81 MILLIGRAM(S): at 11:30

## 2023-10-04 RX ADMIN — MIDODRINE HYDROCHLORIDE 10 MILLIGRAM(S): 2.5 TABLET ORAL at 06:14

## 2023-10-04 NOTE — PROGRESS NOTE ADULT - SUBJECTIVE AND OBJECTIVE BOX
F/U Note:    78y Male PMH of ESRD on HD via tunneled catheter admitted with septic shock 2/2 to PNA and/or line sepsis (tunnelled HD catheter he arrived with).  -patient transferred to ICU for AMs and continued hypotension, required initiation of vasopressors    Interval Hx:  -tunnelled cath tip grew MRSA  -new HD cath placed and patient was dialyzed today  -remains on pressors     Vital Signs Last 24 Hrs  T(C): 36.1 (04 Oct 2023 18:15), Max: 37.1 (04 Oct 2023 03:00)  T(F): 96.9 (04 Oct 2023 18:15), Max: 98.8 (04 Oct 2023 03:00)  HR: 107 (04 Oct 2023 18:30) (66 - 107)  BP: 107/60 (04 Oct 2023 18:30) (81/52 - 123/73)  BP(mean): 73 (04 Oct 2023 18:30) (58 - 108)  RR: 14 (04 Oct 2023 18:30) (11 - 23)  SpO2: 100% (04 Oct 2023 18:30) (96% - 100%)    Parameters below as of 04 Oct 2023 11:00  Patient On (Oxygen Delivery Method): nasal cannula  O2 Flow (L/min): 2                              9.0    15.72 )-----------( 62       ( 04 Oct 2023 15:25 )             26.6         10-04    130<L>  |  99  |  91<H>  ----------------------------<  94  3.8   |  22  |  5.53<H>    Ca    9.1      04 Oct 2023 06:00  Phos  2.1     10-04  Mg     1.9     10-04    TPro  5.9<L>  /  Alb  2.5<L>  /  TBili  0.7  /  DBili  x   /  AST  86<H>  /  ALT  79<H>  /  AlkPhos  397<H>  10-03        NEURO: no headaches, blurry vision, tremors, depression, anxity  CV: no chest pain, palpitations, murmurs, orthopnea  Resp: no shortness of breath, cough, wheeze, sputum production  GI: no stomach pain,nausea, vomitting, flatulence, hematemesis, hematochezia  PV: no swelling of extremities, no hair loss, no coolness to extremities  ENDO: no polydypsia, polyphagia, polyuria, weight loss, night sweats          NEURO: awake and alert  CV: (+) S1/S2, rrr, no mrg  RESP: CTA b/l  GI: soft, non tender

## 2023-10-04 NOTE — PROGRESS NOTE ADULT - SUBJECTIVE AND OBJECTIVE BOX
Seen in ICU, on NC O2    Vital Signs Last 24 Hrs  T(C): 36.4 (10-04-23 @ 19:00), Max: 37.1 (10-04-23 @ 03:00)  T(F): 97.5 (10-04-23 @ 19:00), Max: 98.8 (10-04-23 @ 03:00)  HR: 109 (10-04-23 @ 21:00) (66 - 115)  BP: 110/60 (10-04-23 @ 21:00) (81/52 - 123/73)  BP(mean): 75 (10-04-23 @ 21:00) (58 - 105)  RR: 16 (10-04-23 @ 21:00) (11 - 23)  SpO2: 100% (10-04-23 @ 21:00) (96% - 100%)    I&O's Detail    04 Oct 2023 07:01  -  04 Oct 2023 21:07  --------------------------------------------------------  IN:    IV PiggyBack: 250 mL    Norepinephrine: 53.7 mL    Oral Fluid: 50 mL    Other (mL): 800 mL  Total IN: 1153.7 mL    OUT:    Other (mL): 3300 mL  Total OUT: 3300 mL    s1s2  b/l air entry  soft, ND  sm edema, LUE AVF + bruit                                         9.0    15.72 )-----------( 62       ( 04 Oct 2023 15:25 )             26.6     04 Oct 2023 06:00    130    |  99     |  91     ----------------------------<  94     3.8     |  22     |  5.53     Ca    9.1        04 Oct 2023 06:00  Phos  2.1       04 Oct 2023 06:00  Mg     1.9       04 Oct 2023 06:00    TPro  5.9    /  Alb  2.5    /  TBili  0.7    /  DBili  x      /  AST  86     /  ALT  79     /  AlkPhos  397    03 Oct 2023 15:51    LIVER FUNCTIONS - ( 03 Oct 2023 15:51 )  Alb: 2.5 g/dL / Pro: 5.9 g/dL / ALK PHOS: 397 U/L / ALT: 79 U/L / AST: 86 U/L / GGT: x                               9.0    15.72 )-----------( 62       ( 04 Oct 2023 15:25 )             26.6     04 Oct 2023 06:00    130    |  99     |  91     ----------------------------<  94     3.8     |  22     |  5.53     Ca    9.1        04 Oct 2023 06:00  Phos  2.1       04 Oct 2023 06:00  Mg     1.9       04 Oct 2023 06:00    TPro  5.9    /  Alb  2.5    /  TBili  0.7    /  DBili  x      /  AST  86     /  ALT  79     /  AlkPhos  397    03 Oct 2023 15:51    LIVER FUNCTIONS - ( 03 Oct 2023 15:51 )  Alb: 2.5 g/dL / Pro: 5.9 g/dL / ALK PHOS: 397 U/L / ALT: 79 U/L / AST: 86 U/L / GGT: x           Culture - Catheter (collected 02 Oct 2023 16:21)  Source: .Catheter + MRSA Bacterimia Permacath Removal Aerobic/Anaerobic Right  IJ Permacath TIP  Preliminary Report (04 Oct 2023 18:21):    Greater than or equal to 15 Colonies Staphylococcus aureus    acetaminophen     Tablet .. 650 milliGRAM(s) Oral every 6 hours PRN  aluminum hydroxide/magnesium hydroxide/simethicone Suspension 10 milliLiter(s) Oral every 6 hours PRN  aspirin  chewable 81 milliGRAM(s) Oral daily  epoetin val (PROCRIT) Injectable 53435 Unit(s) IV Push <User Schedule>  folic acid 1 milliGRAM(s) Oral daily  hydrocortisone hemorrhoidal Suppository 1 Suppository(s) Rectal two times a day  influenza  Vaccine (HIGH DOSE) 0.7 milliLiter(s) IntraMuscular once  levothyroxine 137 MICROGram(s) Oral daily  mesalamine Suppository 1000 milliGRAM(s) Rectal at bedtime  midodrine 10 milliGRAM(s) Oral every 8 hours  mupirocin 2% Nasal 1 Application(s) Both Nostrils two times a day  norepinephrine Infusion 0.05 MICROgram(s)/kG/Min IV Continuous <Continuous>  pantoprazole    Tablet 40 milliGRAM(s) Oral before breakfast  polyethylene glycol 3350 17 Gram(s) Oral daily  senna 2 Tablet(s) Oral at bedtime    A/P:    ESRD on HD MWF via perm cath, Afib, CM, EF 45 - 50%  Adm w/MRSA bacteremia  Pls dose Vanco by levels  Perm cath d/c-d 10/2/23  BP support as needed per ICU   S/p HD today via new temp IJ cath, 1.5kg fluid removed   Next HD on Friday  Will follow     [phone] Seen in ICU, on NC O2    Vital Signs Last 24 Hrs  T(C): 36.4 (10-04-23 @ 19:00), Max: 37.1 (10-04-23 @ 03:00)  T(F): 97.5 (10-04-23 @ 19:00), Max: 98.8 (10-04-23 @ 03:00)  HR: 109 (10-04-23 @ 21:00) (66 - 115)  BP: 110/60 (10-04-23 @ 21:00) (81/52 - 123/73)  BP(mean): 75 (10-04-23 @ 21:00) (58 - 105)  RR: 16 (10-04-23 @ 21:00) (11 - 23)  SpO2: 100% (10-04-23 @ 21:00) (96% - 100%)    I&O's Detail    04 Oct 2023 07:01  -  04 Oct 2023 21:07  --------------------------------------------------------  IN:    IV PiggyBack: 250 mL    Norepinephrine: 53.7 mL    Oral Fluid: 50 mL    Other (mL): 800 mL  Total IN: 1153.7 mL    OUT:    Other (mL): 3300 mL  Total OUT: 3300 mL    s1s2  b/l air entry  soft, ND  sm edema, LUE AVF + bruit                                         9.0    15.72 )-----------( 62       ( 04 Oct 2023 15:25 )             26.6     04 Oct 2023 06:00    130    |  99     |  91     ----------------------------<  94     3.8     |  22     |  5.53     Ca    9.1        04 Oct 2023 06:00  Phos  2.1       04 Oct 2023 06:00  Mg     1.9       04 Oct 2023 06:00    TPro  5.9    /  Alb  2.5    /  TBili  0.7    /  DBili  x      /  AST  86     /  ALT  79     /  AlkPhos  397    03 Oct 2023 15:51    LIVER FUNCTIONS - ( 03 Oct 2023 15:51 )  Alb: 2.5 g/dL / Pro: 5.9 g/dL / ALK PHOS: 397 U/L / ALT: 79 U/L / AST: 86 U/L / GGT: x                               9.0    15.72 )-----------( 62       ( 04 Oct 2023 15:25 )             26.6     04 Oct 2023 06:00    130    |  99     |  91     ----------------------------<  94     3.8     |  22     |  5.53     Ca    9.1        04 Oct 2023 06:00  Phos  2.1       04 Oct 2023 06:00  Mg     1.9       04 Oct 2023 06:00    TPro  5.9    /  Alb  2.5    /  TBili  0.7    /  DBili  x      /  AST  86     /  ALT  79     /  AlkPhos  397    03 Oct 2023 15:51    LIVER FUNCTIONS - ( 03 Oct 2023 15:51 )  Alb: 2.5 g/dL / Pro: 5.9 g/dL / ALK PHOS: 397 U/L / ALT: 79 U/L / AST: 86 U/L / GGT: x           Culture - Catheter (collected 02 Oct 2023 16:21)  Source: .Catheter + MRSA Bacterimia Permacath Removal Aerobic/Anaerobic Right  IJ Permacath TIP  Preliminary Report (04 Oct 2023 18:21):    Greater than or equal to 15 Colonies Staphylococcus aureus    acetaminophen     Tablet .. 650 milliGRAM(s) Oral every 6 hours PRN  aluminum hydroxide/magnesium hydroxide/simethicone Suspension 10 milliLiter(s) Oral every 6 hours PRN  aspirin  chewable 81 milliGRAM(s) Oral daily  epoetin val (PROCRIT) Injectable 26674 Unit(s) IV Push <User Schedule>  folic acid 1 milliGRAM(s) Oral daily  hydrocortisone hemorrhoidal Suppository 1 Suppository(s) Rectal two times a day  influenza  Vaccine (HIGH DOSE) 0.7 milliLiter(s) IntraMuscular once  levothyroxine 137 MICROGram(s) Oral daily  mesalamine Suppository 1000 milliGRAM(s) Rectal at bedtime  midodrine 10 milliGRAM(s) Oral every 8 hours  mupirocin 2% Nasal 1 Application(s) Both Nostrils two times a day  norepinephrine Infusion 0.05 MICROgram(s)/kG/Min IV Continuous <Continuous>  pantoprazole    Tablet 40 milliGRAM(s) Oral before breakfast  polyethylene glycol 3350 17 Gram(s) Oral daily  senna 2 Tablet(s) Oral at bedtime    A/P:    ESRD on HD MWF via perm cath, Afib, CM, EF 45 - 50%  Adm w/MRSA bacteremia  Pls dose Vanco by levels  Perm cath d/c-d 10/2/23  BP support as needed per ICU   S/p HD today via new temp IJ cath, 1.5kg fluid removed   Next HD on Friday  Will follow     [phone] Seen in ICU, on NC O2    Vital Signs Last 24 Hrs  T(C): 36.4 (10-04-23 @ 19:00), Max: 37.1 (10-04-23 @ 03:00)  T(F): 97.5 (10-04-23 @ 19:00), Max: 98.8 (10-04-23 @ 03:00)  HR: 109 (10-04-23 @ 21:00) (66 - 115)  BP: 110/60 (10-04-23 @ 21:00) (81/52 - 123/73)  BP(mean): 75 (10-04-23 @ 21:00) (58 - 105)  RR: 16 (10-04-23 @ 21:00) (11 - 23)  SpO2: 100% (10-04-23 @ 21:00) (96% - 100%)    I&O's Detail    04 Oct 2023 07:01  -  04 Oct 2023 21:07  --------------------------------------------------------  IN:    IV PiggyBack: 250 mL    Norepinephrine: 53.7 mL    Oral Fluid: 50 mL    Other (mL): 800 mL  Total IN: 1153.7 mL    OUT:    Other (mL): 3300 mL  Total OUT: 3300 mL    s1s2  b/l air entry  soft, ND  sm edema, LUE AVF + bruit                                         9.0    15.72 )-----------( 62       ( 04 Oct 2023 15:25 )             26.6     04 Oct 2023 06:00    130    |  99     |  91     ----------------------------<  94     3.8     |  22     |  5.53     Ca    9.1        04 Oct 2023 06:00  Phos  2.1       04 Oct 2023 06:00  Mg     1.9       04 Oct 2023 06:00    TPro  5.9    /  Alb  2.5    /  TBili  0.7    /  DBili  x      /  AST  86     /  ALT  79     /  AlkPhos  397    03 Oct 2023 15:51    LIVER FUNCTIONS - ( 03 Oct 2023 15:51 )  Alb: 2.5 g/dL / Pro: 5.9 g/dL / ALK PHOS: 397 U/L / ALT: 79 U/L / AST: 86 U/L / GGT: x                               9.0    15.72 )-----------( 62       ( 04 Oct 2023 15:25 )             26.6     04 Oct 2023 06:00    130    |  99     |  91     ----------------------------<  94     3.8     |  22     |  5.53     Ca    9.1        04 Oct 2023 06:00  Phos  2.1       04 Oct 2023 06:00  Mg     1.9       04 Oct 2023 06:00    TPro  5.9    /  Alb  2.5    /  TBili  0.7    /  DBili  x      /  AST  86     /  ALT  79     /  AlkPhos  397    03 Oct 2023 15:51    LIVER FUNCTIONS - ( 03 Oct 2023 15:51 )  Alb: 2.5 g/dL / Pro: 5.9 g/dL / ALK PHOS: 397 U/L / ALT: 79 U/L / AST: 86 U/L / GGT: x           Culture - Catheter (collected 02 Oct 2023 16:21)  Source: .Catheter + MRSA Bacterimia Permacath Removal Aerobic/Anaerobic Right  IJ Permacath TIP  Preliminary Report (04 Oct 2023 18:21):    Greater than or equal to 15 Colonies Staphylococcus aureus    acetaminophen     Tablet .. 650 milliGRAM(s) Oral every 6 hours PRN  aluminum hydroxide/magnesium hydroxide/simethicone Suspension 10 milliLiter(s) Oral every 6 hours PRN  aspirin  chewable 81 milliGRAM(s) Oral daily  epoetin val (PROCRIT) Injectable 71903 Unit(s) IV Push <User Schedule>  folic acid 1 milliGRAM(s) Oral daily  hydrocortisone hemorrhoidal Suppository 1 Suppository(s) Rectal two times a day  influenza  Vaccine (HIGH DOSE) 0.7 milliLiter(s) IntraMuscular once  levothyroxine 137 MICROGram(s) Oral daily  mesalamine Suppository 1000 milliGRAM(s) Rectal at bedtime  midodrine 10 milliGRAM(s) Oral every 8 hours  mupirocin 2% Nasal 1 Application(s) Both Nostrils two times a day  norepinephrine Infusion 0.05 MICROgram(s)/kG/Min IV Continuous <Continuous>  pantoprazole    Tablet 40 milliGRAM(s) Oral before breakfast  polyethylene glycol 3350 17 Gram(s) Oral daily  senna 2 Tablet(s) Oral at bedtime    A/P:    ESRD on HD MWF via perm cath, Afib, CM, EF 45 - 50%  Adm w/MRSA bacteremia  Pls dose Vanco by levels  Perm cath d/c-d 10/2/23  BP support as needed per ICU   S/p HD today via new temp IJ cath, 1.5kg fluid removed   Next HD on Friday  Will follow     [phone]

## 2023-10-04 NOTE — PROGRESS NOTE ADULT - ASSESSMENT
77 yo M pmhx ESRD came to ED complaining of fatigue. Patient noted to be febrile to 102.2 in ED with complains of cough and wheezing. Received 1l NS bolus and noted to be hypotensive to 80's systolic. Complained of some abdominal pain but unsure why he is in the hospital. Poor historian and unable to fully participate in ROS.  Hematology consulted for thrombocytopenia. 79 yo M pmhx ESRD came to ED complaining of fatigue. Patient noted to be febrile to 102.2 in ED with complains of cough and wheezing. Received 1l NS bolus and noted to be hypotensive to 80's systolic. Complained of some abdominal pain but unsure why he is in the hospital. Poor historian and unable to fully participate in ROS.  Hematology consulted for thrombocytopenia.

## 2023-10-04 NOTE — PROGRESS NOTE ADULT - PROBLEM SELECTOR PLAN 1
Patient with thrombocytopenia clinically secondary to sepsis/BM suppression. Anemia associated with ESRD/chronic disease contributing. Platelet count currently stable, as is hemoglobin. T/C PRBC for hemoglobin<7/related symptoms.

## 2023-10-04 NOTE — PROGRESS NOTE ADULT - ASSESSMENT
78y Male PMH of ESRD on HD via tunneled catheter admitted with septic shock 2/2 to PNA and/or line sepsis (tunnelled HD catheter he arrived with).  -patient transferred to ICU for AMs and continued hypotension, required initiation of vasopressors    Interval Hx:  -tunnelled cath tip grew MRSA  -new HD cath placed and patient was dialyzed today  -remains on pressors           PLAN:    -patient currently on Levophed, will titrate for MAP 65-70  -patient started on midodrine, has helped lwoer pressor need will continue until off pressors  -if remains on pressors by Am would consider starting stress dose steroids   -repeat lactate  -MRSA bacteremia noted, will dose vanco by lei westfall if level <20 in Am will give 1 gram of IVPB vanco as per ID note   -had TTE, no vegitations noted   -follow cultures and narrow antibitoics as able  -new temp. HD cath placed, patient was dialyzed today, continue to follow renal recs regarding HD  -had some runs of v-tach, currently sinus rhythm, cards is following no acute interventions planned. possible ERENDIRA if sepsis resolved  -require dpaltelet transfusion for thrombocytopenia, PLt now >60K  -goal UOP >0.5 cc/kg/hr         CRITICAL CARE TIME SPENT:  45 minutes of critical care time spent providing medical care for patient's acute illness/conditions that impairs at least one vital organ system and/or poses a high risk of imminent or life threatening deterioration in the patient's condition. It includes time spent evaluating and treating the patient's acute illness as well as time spent reviewing labs, radiology, discussing goals of care with patient and/or patient's family, and discussing the case with a multidisciplinary team, including the eICU, in an effort to prevent further life threatening deterioration or end organ damage. This time is independent of any procedures performed.

## 2023-10-04 NOTE — PROGRESS NOTE ADULT - NUTRITIONAL ASSESSMENT
This patient has been assessed with a concern for Malnutrition and has been determined to have a diagnosis/diagnoses of Moderate protein-calorie malnutrition.    This patient is being managed with:   Diet Renal Restrictions-  For patients receiving Renal Replacement - No Protein Restr No Conc K No Conc Phos Low Sodium  1000mL Fluid Restriction ([id number])  Supplement Feeding Modality:  Oral  Nepro Cans or Servings Per Day:  1       Frequency:  Two Times a day  Entered: Oct  3 2023 11:01AM   This patient has been assessed with a concern for Malnutrition and has been determined to have a diagnosis/diagnoses of Moderate protein-calorie malnutrition.    This patient is being managed with:   Diet Renal Restrictions-  For patients receiving Renal Replacement - No Protein Restr No Conc K No Conc Phos Low Sodium  1000mL Fluid Restriction ([id number])  Supplement Feeding Modality:  Oral  Nepro Cans or Servings Per Day:  1       Frequency:  Two Times a day  Entered: Oct  3 2023 11:01AM   This patient has been assessed with a concern for Malnutrition and has been determined to have a diagnosis/diagnoses of Moderate protein-calorie malnutrition.    This patient is being managed with:   Diet Renal Restrictions-  For patients receiving Renal Replacement - No Protein Restr No Conc K No Conc Phos Low Sodium  1000mL Fluid Restriction ([id number])  Supplement Feeding Modality:  Oral  Nepro Cans or Servings Per Day:  1       Frequency:  Two Times a day  Entered: Oct  3 2023 11:01AM

## 2023-10-04 NOTE — PROGRESS NOTE ADULT - NS ATTEND AMEND GEN_ALL_CORE FT
pt remains on pressors  tolerating diet  pt with continued poor mental status  IV antibiotics as per ID  repeat cultures sent today  new shiley to be placed for HD today  pt appears volume overloaded today.

## 2023-10-04 NOTE — PROGRESS NOTE ADULT - SUBJECTIVE AND OBJECTIVE BOX
JANEE ARCEO  [id number]      Chief Complaint: Septic shock     Interval History: The patient is more alert but remains confused, states that he is at home now.     Tele: atrial fibrillation 80s BPM      Current meds:   acetaminophen     Tablet .. 650 milliGRAM(s) Oral every 6 hours PRN  aluminum hydroxide/magnesium hydroxide/simethicone Suspension 10 milliLiter(s) Oral every 6 hours PRN  aspirin  chewable 81 milliGRAM(s) Oral daily  folic acid 1 milliGRAM(s) Oral daily  hydrocortisone hemorrhoidal Suppository 1 Suppository(s) Rectal two times a day  influenza  Vaccine (HIGH DOSE) 0.7 milliLiter(s) IntraMuscular once  levothyroxine 137 MICROGram(s) Oral daily  mesalamine Suppository 1000 milliGRAM(s) Rectal at bedtime  midodrine 10 milliGRAM(s) Oral every 8 hours  mupirocin 2% Nasal 1 Application(s) Both Nostrils two times a day  norepinephrine Infusion 0.05 MICROgram(s)/kG/Min IV Continuous <Continuous>  pantoprazole    Tablet 40 milliGRAM(s) Oral before breakfast  polyethylene glycol 3350 17 Gram(s) Oral daily  senna 2 Tablet(s) Oral at bedtime  vancomycin  IVPB 1000 milliGRAM(s) IV Intermittent once      Objective:     Vital Signs:   T(C): 37.1 (10-04-23 @ 03:00), Max: 37.1 (10-04-23 @ 03:00)  HR: 69 (10-04-23 @ 08:00) (66 - 111)  BP: 104/60 (10-04-23 @ 08:00) (86/48 - 131/57)  RR: 21 (10-04-23 @ 08:00) (10 - 23)  SpO2: 98% (10-04-23 @ 08:00) (96% - 100%)      Physical Exam:   General: elderly man, altered, chronically ill appearing  HEENT: sclera anicteric  Neck: supple  CVS: JVP ~ 7 cm H20, irregularly irregular, s1, s2  Chest: decreased breath sounds  Abdomen: non-distended  Extremities: no lower extremity edema b/l  Neuro: minimally awake      Labs:   04 Oct 2023 06:00    130    |  99     |  91     ----------------------------<  94     3.8     |  22     |  5.53     Ca    9.1        04 Oct 2023 06:00  Phos  2.1       04 Oct 2023 06:00  Mg     1.9       04 Oct 2023 06:00    TPro  5.9    /  Alb  2.5    /  TBili  0.7    /  DBili  x      /  AST  86     /  ALT  79     /  AlkPhos  397    03 Oct 2023 15:51                          8.7    12.80 )-----------( 62       ( 04 Oct 2023 06:00 )             25.9             Coronary angiogram (7/2022):  LM: mild atherosclerosis  LAD: mid 50% stenosis  D1: 60% stenosis  D2: 90% stenosis  LCx: 95% stenosis  RCA: mild atherosclerosis   LIMA-D2 graft: no disease  SVG-OM1: no disease    ERENDIRA (3/2023):  LVEF 25-30%  Reduced RV systolic function   Moderate AR, Moderate-severe TR, Bio-MVR  No obvious vegetations visualized  No pericardial effusion seen.     TTE (8/2023):  LVEF 53%  Mildly enlarged RV size with reduced systolic function  Severe biatrial enlargement  Mild-moderate TR, Mild-moderate AR  Bio-MVR  No obvious evidence of endocarditis. Consider ERENDIRA if clinically indicated.    TTE (10/1/23):   1. 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 Biatrial enlargement   4. Left ventricular ejection fraction, by visual estimation, is 45 to 50%.   5. Mild mitral annular calcification.   6. Mild-moderate tricuspid regurgitation.   7. Mild aortic valve stenosis.      ECG (9/30/23): atrial fibrillation, PVC  ECG (10/3/23): atrial fibrillation, old inferior infarct, ventricular couplet   JANEE ARCEO  [id number]      Chief Complaint: Septic shock     Interval History: The patient is more alert but remains confused, states that he is at home now.     Tele: atrial fibrillation 80s BPM      Current meds:   acetaminophen     Tablet .. 650 milliGRAM(s) Oral every 6 hours PRN  aluminum hydroxide/magnesium hydroxide/simethicone Suspension 10 milliLiter(s) Oral every 6 hours PRN  aspirin  chewable 81 milliGRAM(s) Oral daily  folic acid 1 milliGRAM(s) Oral daily  hydrocortisone hemorrhoidal Suppository 1 Suppository(s) Rectal two times a day  influenza  Vaccine (HIGH DOSE) 0.7 milliLiter(s) IntraMuscular once  levothyroxine 137 MICROGram(s) Oral daily  mesalamine Suppository 1000 milliGRAM(s) Rectal at bedtime  midodrine 10 milliGRAM(s) Oral every 8 hours  mupirocin 2% Nasal 1 Application(s) Both Nostrils two times a day  norepinephrine Infusion 0.05 MICROgram(s)/kG/Min IV Continuous <Continuous>  pantoprazole    Tablet 40 milliGRAM(s) Oral before breakfast  polyethylene glycol 3350 17 Gram(s) Oral daily  senna 2 Tablet(s) Oral at bedtime  vancomycin  IVPB 1000 milliGRAM(s) IV Intermittent once      Objective:     Vital Signs:   T(C): 37.1 (10-04-23 @ 03:00), Max: 37.1 (10-04-23 @ 03:00)  HR: 69 (10-04-23 @ 08:00) (66 - 111)  BP: 104/60 (10-04-23 @ 08:00) (86/48 - 131/57)  RR: 21 (10-04-23 @ 08:00) (10 - 23)  SpO2: 98% (10-04-23 @ 08:00) (96% - 100%)      Physical Exam:   General: elderly man, altered, chronically ill appearing  HEENT: sclera anicteric  Neck: supple  CVS: JVP ~ 7 cm H20, irregularly irregular, s1, s2  Chest: decreased breath sounds  Abdomen: non-distended  Extremities: no lower extremity edema b/l  Neuro: minimally awake      Labs:   04 Oct 2023 06:00    130    |  99     |  91     ----------------------------<  94     3.8     |  22     |  5.53     Ca    9.1        04 Oct 2023 06:00  Phos  2.1       04 Oct 2023 06:00  Mg     1.9       04 Oct 2023 06:00    TPro  5.9    /  Alb  2.5    /  TBili  0.7    /  DBili  x      /  AST  86     /  ALT  79     /  AlkPhos  397    03 Oct 2023 15:51                          8.7    12.80 )-----------( 62       ( 04 Oct 2023 06:00 )             25.9             Coronary angiogram (7/2022):  LM: mild atherosclerosis  LAD: mid 50% stenosis  D1: 60% stenosis  D2: 90% stenosis  LCx: 95% stenosis  RCA: mild atherosclerosis   LIMA-D2 graft: no disease  SVG-OM1: no disease    ERENDIRA (3/2023):  LVEF 25-30%  Reduced RV systolic function   Moderate AR, Moderate-severe TR, Bio-MVR  No obvious vegetations visualized  No pericardial effusion seen.     TTE (8/2023):  LVEF 53%  Mildly enlarged RV size with reduced systolic function  Severe biatrial enlargement  Mild-moderate TR, Mild-moderate AR  Bio-MVR  No obvious evidence of endocarditis. Consider ERENDIRA if clinically indicated.    TTE (10/1/23):   1. 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 Biatrial enlargement   4. Left ventricular ejection fraction, by visual estimation, is 45 to 50%.   5. Mild mitral annular calcification.   6. Mild-moderate tricuspid regurgitation.   7. Mild aortic valve stenosis.      ECG (9/30/23): atrial fibrillation, PVC  ECG (10/3/23): atrial fibrillation, old inferior infarct, ventricular couplet   JANEE ARCEO  [id number]      Chief Complaint: Septic shock     Interval History: The patient is more alert but remains confused, states that he is at home now.     Tele: atrial fibrillation 80s BPM      Current meds:   acetaminophen     Tablet .. 650 milliGRAM(s) Oral every 6 hours PRN  aluminum hydroxide/magnesium hydroxide/simethicone Suspension 10 milliLiter(s) Oral every 6 hours PRN  aspirin  chewable 81 milliGRAM(s) Oral daily  folic acid 1 milliGRAM(s) Oral daily  hydrocortisone hemorrhoidal Suppository 1 Suppository(s) Rectal two times a day  influenza  Vaccine (HIGH DOSE) 0.7 milliLiter(s) IntraMuscular once  levothyroxine 137 MICROGram(s) Oral daily  mesalamine Suppository 1000 milliGRAM(s) Rectal at bedtime  midodrine 10 milliGRAM(s) Oral every 8 hours  mupirocin 2% Nasal 1 Application(s) Both Nostrils two times a day  norepinephrine Infusion 0.05 MICROgram(s)/kG/Min IV Continuous <Continuous>  pantoprazole    Tablet 40 milliGRAM(s) Oral before breakfast  polyethylene glycol 3350 17 Gram(s) Oral daily  senna 2 Tablet(s) Oral at bedtime  vancomycin  IVPB 1000 milliGRAM(s) IV Intermittent once      Objective:     Vital Signs:   T(C): 37.1 (10-04-23 @ 03:00), Max: 37.1 (10-04-23 @ 03:00)  HR: 69 (10-04-23 @ 08:00) (66 - 111)  BP: 104/60 (10-04-23 @ 08:00) (86/48 - 131/57)  RR: 21 (10-04-23 @ 08:00) (10 - 23)  SpO2: 98% (10-04-23 @ 08:00) (96% - 100%)      Physical Exam:   General: elderly man, altered, chronically ill appearing  HEENT: sclera anicteric  Neck: supple  CVS: JVP ~ 7 cm H20, irregularly irregular, s1, s2  Chest: decreased breath sounds  Abdomen: non-distended  Extremities: no lower extremity edema b/l  Neuro: minimally awake      Labs:   04 Oct 2023 06:00    130    |  99     |  91     ----------------------------<  94     3.8     |  22     |  5.53     Ca    9.1        04 Oct 2023 06:00  Phos  2.1       04 Oct 2023 06:00  Mg     1.9       04 Oct 2023 06:00    TPro  5.9    /  Alb  2.5    /  TBili  0.7    /  DBili  x      /  AST  86     /  ALT  79     /  AlkPhos  397    03 Oct 2023 15:51                          8.7    12.80 )-----------( 62       ( 04 Oct 2023 06:00 )             25.9             Coronary angiogram (7/2022):  LM: mild atherosclerosis  LAD: mid 50% stenosis  D1: 60% stenosis  D2: 90% stenosis  LCx: 95% stenosis  RCA: mild atherosclerosis   LIMA-D2 graft: no disease  SVG-OM1: no disease    ERENDIRA (3/2023):  LVEF 25-30%  Reduced RV systolic function   Moderate AR, Moderate-severe TR, Bio-MVR  No obvious vegetations visualized  No pericardial effusion seen.     TTE (8/2023):  LVEF 53%  Mildly enlarged RV size with reduced systolic function  Severe biatrial enlargement  Mild-moderate TR, Mild-moderate AR  Bio-MVR  No obvious evidence of endocarditis. Consider ERENDIRA if clinically indicated.    TTE (10/1/23):   1. 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 Biatrial enlargement   4. Left ventricular ejection fraction, by visual estimation, is 45 to 50%.   5. Mild mitral annular calcification.   6. Mild-moderate tricuspid regurgitation.   7. Mild aortic valve stenosis.      ECG (9/30/23): atrial fibrillation, PVC  ECG (10/3/23): atrial fibrillation, old inferior infarct, ventricular couplet

## 2023-10-04 NOTE — PROGRESS NOTE ADULT - SUBJECTIVE AND OBJECTIVE BOX
Surgery Progress Note    Subjective:     Patient seen and examined at bedside. POD 2. Continues on Levo .05, down from .07. Given Albumin o/n. Stitches removed at bedside, and fresh dressing applied. Wound hemostatic    OBJECTIVE:     T(C): 37.1 (10-04-23 @ 03:00), Max: 37.1 (10-04-23 @ 03:00)  HR: 77 (10-04-23 @ 07:00) (66 - 111)  BP: 100/57 (10-04-23 @ 07:00) (86/48 - 131/57)  RR: 21 (10-04-23 @ 07:00) (10 - 22)  SpO2: 99% (10-04-23 @ 07:00) (96% - 100%)  Wt(kg): --    I&O's Detail    03 Oct 2023 07:01  -  04 Oct 2023 07:00  --------------------------------------------------------  IN:    Albumin 5% - 50 mL: 50 mL    Norepinephrine: 144.6 mL  Total IN: 194.6 mL    OUT:  Total OUT: 0 mL    Total NET: 194.6 mL          PHYSICAL EXAM:    GENERAL: Mildly responsive, lying in bed comfortably  Abdomen- Soft, non tender  Chest- Permacath port site near clavicle c/d/i, old dried blood near wound  Extremities- L arm AVF    MEDICATIONS  (STANDING):  aspirin  chewable 81 milliGRAM(s) Oral daily  folic acid 1 milliGRAM(s) Oral daily  hydrocortisone hemorrhoidal Suppository 1 Suppository(s) Rectal two times a day  influenza  Vaccine (HIGH DOSE) 0.7 milliLiter(s) IntraMuscular once  levothyroxine 137 MICROGram(s) Oral daily  mesalamine Suppository 1000 milliGRAM(s) Rectal at bedtime  midodrine 10 milliGRAM(s) Oral every 8 hours  mupirocin 2% Nasal 1 Application(s) Both Nostrils two times a day  norepinephrine Infusion 0.05 MICROgram(s)/kG/Min (6.81 mL/Hr) IV Continuous <Continuous>  pantoprazole    Tablet 40 milliGRAM(s) Oral before breakfast  polyethylene glycol 3350 17 Gram(s) Oral daily  senna 2 Tablet(s) Oral at bedtime    MEDICATIONS  (PRN):  acetaminophen     Tablet .. 650 milliGRAM(s) Oral every 6 hours PRN Mild Pain (1 - 3)  aluminum hydroxide/magnesium hydroxide/simethicone Suspension 10 milliLiter(s) Oral every 6 hours PRN dyspepsia      LABS:                          8.7    12.80 )-----------( 62       ( 04 Oct 2023 06:00 )             25.9     10-03    130<L>  |  96  |  90<H>  ----------------------------<  99  3.9   |  20<L>  |  5.30<H>    Ca    9.3      03 Oct 2023 15:51  Phos  2.3     10-03  Mg     2.2     10-03    TPro  5.9<L>  /  Alb  2.5<L>  /  TBili  0.7  /  DBili  x   /  AST  86<H>  /  ALT  79<H>  /  AlkPhos  397<H>  10-03

## 2023-10-04 NOTE — PROGRESS NOTE ADULT - SUBJECTIVE AND OBJECTIVE BOX
JANEE ARCEO   78y   Male    Admitting: ZENA Davidson  HPI:    77 yo M pmhx ESRD came to ED complaining of fatigue. Patient noted to be febrile to 102.2 in ED with complains of cough and wheezing. Received 1l NS bolus and noted to be hypotensive to 80's systolic. Complained of some abdominal pain but unsure why he is in the hospital. Poor historian and unable to fully participate in ROS     PAST MEDICAL & SURGICAL HISTORY:  Chronic atrial fibrillation      History of BPH      CAD (coronary artery disease)      Coronary stent patent      ESRD on dialysis      History of GI bleed      Mitral valve replaced        HEALTH ISSUES - PROBLEM Dx:  Thrombocytopenia    Chronic atrial fibrillation    History of BPH    ESRD on dialysis    CAD (coronary artery disease)    Coronary stent patent    Mitral valve replaced      MEDICATIONS  (STANDING):  aspirin  chewable 81 milliGRAM(s) Oral daily  folic acid 1 milliGRAM(s) Oral daily  hydrocortisone hemorrhoidal Suppository 1 Suppository(s) Rectal two times a day  influenza  Vaccine (HIGH DOSE) 0.7 milliLiter(s) IntraMuscular once  levothyroxine 137 MICROGram(s) Oral daily  mesalamine Suppository 1000 milliGRAM(s) Rectal at bedtime  midodrine 10 milliGRAM(s) Oral every 8 hours  mupirocin 2% Nasal 1 Application(s) Both Nostrils two times a day  norepinephrine Infusion 0.05 MICROgram(s)/kG/Min (6.81 mL/Hr) IV Continuous <Continuous>  pantoprazole    Tablet 40 milliGRAM(s) Oral before breakfast  polyethylene glycol 3350 17 Gram(s) Oral daily  senna 2 Tablet(s) Oral at bedtime  vancomycin  IVPB 1000 milliGRAM(s) IV Intermittent once    MEDICATIONS  (PRN):  acetaminophen     Tablet .. 650 milliGRAM(s) Oral every 6 hours PRN Mild Pain (1 - 3)  aluminum hydroxide/magnesium hydroxide/simethicone Suspension 10 milliLiter(s) Oral every 6 hours PRN dyspepsia    Allergies    levofloxacin (Unknown)  alfuzosin (Unknown)  tamsulosin (Unknown)  Myrbetriq (Unknown)    INTERVAL HPI/OVERNIGHT EVENTS:  Patient S&E at bedside. Non-verbal.     VITAL SIGNS:  T(F): 98.8 (10-04-23 @ 03:00)  HR: 83 (10-04-23 @ 08:30)  BP: 102/60 (10-04-23 @ 08:30)  RR: 20 (10-04-23 @ 08:30)  SpO2: 98% (10-04-23 @ 08:30)      PHYSICAL EXAM:  Constitutional: NAD  Eyes: sclera non-icteric  Neck: no JVD  Respiratory: scattered crackles ant.  Cardiovascular: RRR, no M/R/G  Gastrointestinal: soft, NTND, no masses palpable  Extremities: no calf tenderness elicited      Labs:             8.7    12.80 )-----------( 62       ( 10-04 @ 06:00 )             25.9                8.2    9.11  )-----------( 61       ( 10-03 @ 06:32 )             24.2                x      x     )-----------( x        ( 10-03 @ 05:00 )             24.2                9.2    9.51  )-----------( 50       ( 10-02 @ 05:00 )             27.9                10.4   10.77 )-----------( 56       ( 10-01 @ 09:40 )             31.9       10-04    130<L>  |  99  |  91<H>  ----------------------------<  94  3.8   |  22  |  5.53<H>    Ca    9.1      04 Oct 2023 06:00  Phos  2.1     10-04  Mg     1.9     10-04    TPro  5.9<L>  /  Alb  2.5<L>  /  TBili  0.7  /  DBili  x   /  AST  86<H>  /  ALT  79<H>  /  AlkPhos  397<H>  10-03      Vitamin B12, Serum: 736 pg/mL (10-03-23 @ 06:32)  Absolute Reticulocytes: 24.6 K/uL (10-03-23 @ 06:32)  Iron Total: 19 ug/dL (10-02-23 @ 10:00)  Iron - Total Binding Capacity.: 107 ug/dL (10-02-23 @ 10:00)  % Saturation, Iron: 18 % (10-02-23 @ 10:00)  Ferritin: 1029 ng/mL (10-02-23 @ 10:00)  Absolute Reticulocytes: 34.2 K/uL (10-02-23 @ 10:00)  Vitamin B12, Serum: 636 pg/mL (10-02-23 @ 10:00)  Folate, Serum: >20.0 ng/mL (10-02-23 @ 10:00)    Consultant notes reviewed : YES [x ] ; NO [ ]   JANEE ARCEO   78y   Male    Admitting: ZENA Davidson  HPI:    79 yo M pmhx ESRD came to ED complaining of fatigue. Patient noted to be febrile to 102.2 in ED with complains of cough and wheezing. Received 1l NS bolus and noted to be hypotensive to 80's systolic. Complained of some abdominal pain but unsure why he is in the hospital. Poor historian and unable to fully participate in ROS     PAST MEDICAL & SURGICAL HISTORY:  Chronic atrial fibrillation      History of BPH      CAD (coronary artery disease)      Coronary stent patent      ESRD on dialysis      History of GI bleed      Mitral valve replaced        HEALTH ISSUES - PROBLEM Dx:  Thrombocytopenia    Chronic atrial fibrillation    History of BPH    ESRD on dialysis    CAD (coronary artery disease)    Coronary stent patent    Mitral valve replaced      MEDICATIONS  (STANDING):  aspirin  chewable 81 milliGRAM(s) Oral daily  folic acid 1 milliGRAM(s) Oral daily  hydrocortisone hemorrhoidal Suppository 1 Suppository(s) Rectal two times a day  influenza  Vaccine (HIGH DOSE) 0.7 milliLiter(s) IntraMuscular once  levothyroxine 137 MICROGram(s) Oral daily  mesalamine Suppository 1000 milliGRAM(s) Rectal at bedtime  midodrine 10 milliGRAM(s) Oral every 8 hours  mupirocin 2% Nasal 1 Application(s) Both Nostrils two times a day  norepinephrine Infusion 0.05 MICROgram(s)/kG/Min (6.81 mL/Hr) IV Continuous <Continuous>  pantoprazole    Tablet 40 milliGRAM(s) Oral before breakfast  polyethylene glycol 3350 17 Gram(s) Oral daily  senna 2 Tablet(s) Oral at bedtime  vancomycin  IVPB 1000 milliGRAM(s) IV Intermittent once    MEDICATIONS  (PRN):  acetaminophen     Tablet .. 650 milliGRAM(s) Oral every 6 hours PRN Mild Pain (1 - 3)  aluminum hydroxide/magnesium hydroxide/simethicone Suspension 10 milliLiter(s) Oral every 6 hours PRN dyspepsia    Allergies    levofloxacin (Unknown)  alfuzosin (Unknown)  tamsulosin (Unknown)  Myrbetriq (Unknown)    INTERVAL HPI/OVERNIGHT EVENTS:  Patient S&E at bedside. Non-verbal.     VITAL SIGNS:  T(F): 98.8 (10-04-23 @ 03:00)  HR: 83 (10-04-23 @ 08:30)  BP: 102/60 (10-04-23 @ 08:30)  RR: 20 (10-04-23 @ 08:30)  SpO2: 98% (10-04-23 @ 08:30)      PHYSICAL EXAM:  Constitutional: NAD  Eyes: sclera non-icteric  Neck: no JVD  Respiratory: scattered crackles ant.  Cardiovascular: RRR, no M/R/G  Gastrointestinal: soft, NTND, no masses palpable  Extremities: no calf tenderness elicited      Labs:             8.7    12.80 )-----------( 62       ( 10-04 @ 06:00 )             25.9                8.2    9.11  )-----------( 61       ( 10-03 @ 06:32 )             24.2                x      x     )-----------( x        ( 10-03 @ 05:00 )             24.2                9.2    9.51  )-----------( 50       ( 10-02 @ 05:00 )             27.9                10.4   10.77 )-----------( 56       ( 10-01 @ 09:40 )             31.9       10-04    130<L>  |  99  |  91<H>  ----------------------------<  94  3.8   |  22  |  5.53<H>    Ca    9.1      04 Oct 2023 06:00  Phos  2.1     10-04  Mg     1.9     10-04    TPro  5.9<L>  /  Alb  2.5<L>  /  TBili  0.7  /  DBili  x   /  AST  86<H>  /  ALT  79<H>  /  AlkPhos  397<H>  10-03      Vitamin B12, Serum: 736 pg/mL (10-03-23 @ 06:32)  Absolute Reticulocytes: 24.6 K/uL (10-03-23 @ 06:32)  Iron Total: 19 ug/dL (10-02-23 @ 10:00)  Iron - Total Binding Capacity.: 107 ug/dL (10-02-23 @ 10:00)  % Saturation, Iron: 18 % (10-02-23 @ 10:00)  Ferritin: 1029 ng/mL (10-02-23 @ 10:00)  Absolute Reticulocytes: 34.2 K/uL (10-02-23 @ 10:00)  Vitamin B12, Serum: 636 pg/mL (10-02-23 @ 10:00)  Folate, Serum: >20.0 ng/mL (10-02-23 @ 10:00)    Consultant notes reviewed : YES [x ] ; NO [ ]

## 2023-10-04 NOTE — PROGRESS NOTE ADULT - NUTRITIONAL ASSESSMENT
This patient has been assessed with a concern for Malnutrition and has been determined to have a diagnosis/diagnoses of Moderate protein-calorie malnutrition.    This patient is being managed with:   Diet Renal Restrictions-  For patients receiving Renal Replacement - No Protein Restr No Conc K No Conc Phos Low Sodium  1000mL Fluid Restriction ([id number])  Supplement Feeding Modality:  Oral  Nepro Cans or Servings Per Day:  1       Frequency:  Two Times a day  Entered: Oct  3 2023 11:01AM   This patient has been assessed with a concern for Malnutrition and has been determined to have a diagnosis/diagnoses of Moderate protein-calorie malnutrition.    This patient is being managed with:   Diet Renal Restrictions-  For patients receiving Renal Replacement - No Protein Restr No Conc K No Conc Phos Low Sodium  1000mL Fluid Restriction ([id number])  Supplement Feeding Modality:  Oral  Nepro Cans or Servings Per Day:  1       Frequency:  Two Times a day  Entered: Oct  3 2023 11:01AM   This patient has been assessed with a concern for Malnutrition and has been determined to have a diagnosis/diagnoses of Moderate protein-calorie malnutrition.    This patient is being managed with:   Diet Renal Restrictions-  For patients receiving Renal Replacement - No Protein Restr No Conc K No Conc Phos Low Sodium  1000mL Fluid Restriction ([id number])  Supplement Feeding Modality:  Oral  Nepro Cans or Servings Per Day:  1       Frequency:  Two Times a day  Entered: Oct  3 2023 11:01AM

## 2023-10-04 NOTE — PROCEDURE NOTE - NSUSCPTCODES_ED_ALL
32455 Guidance for Vascular Access 83077 Guidance for Vascular Access 67132 Guidance for Vascular Access

## 2023-10-04 NOTE — PROGRESS NOTE ADULT - ASSESSMENT
Septic shock, suspect line sepsis  77 yo M pmhx ESRD came to ED complaining of fatigue. Patient noted to be febrile to 102.2 in ED with complains of cough and wheezing.  Poor historian and unable to fully participate in ROS. ADmitted to ICU for further management of:    Sepsis  AMS- metabolic encephelopathy  Thrombocytopenia, suspect from Sepsis  Underlying h/o CAD s/p PCI, BPH, ESRD on HD, s/p MVR, Afib      Plan  mental status remains poor, suspect metabolic encephelopathy   CT head to eval for other causes   Repeat blood cultures sent this AM   remains on low dose pressors  taper pressors  to maintain map > 60  Continue midodrine  Afebrile   antibiotics as per ID, vanco trough 19 this AM will give vanco after HD today  Perm cath removed 10/3/2023 , will need Shiley placed for HD today per nephrology  f/u cultures   continue to hold a/c due to thrombocytopenia   PLT remain in 60s s/p 1 unit PLT on 10/1     Patient is full CODE    Patient seen and evaluated with Dr Davidson who agrees to above stated plan        Septic shock, suspect line sepsis  77 yo M pmhx ESRD came to ED complaining of fatigue. Patient noted to be febrile to 102.2 in ED with complains of cough and wheezing.  Poor historian and unable to fully participate in ROS. ADmitted to ICU for further management of:    Sepsis  AMS- metabolic encephelopathy  Thrombocytopenia, suspect from Sepsis  Underlying h/o CAD s/p PCI, BPH, ESRD on HD, s/p MVR, Afib      Plan  mental status remains poor, suspect metabolic encephelopathy -  CT head to eval for other causes   Repeat blood cultures sent this AM   remains on low dose pressors  taper pressors  to maintain map > 60  Continue midodrine  Afebrile   antibiotics as per ID, vanco trough 19 this AM will give vanco after HD today  Perm cath removed 10/3/2023 , will need Shiley placed for HD today per nephrology  f/u cultures   continue to hold a/c due to thrombocytopenia   PLT remain in 60s s/p 1 unit PLT on 10/1     Patient is full CODE    Patient seen and evaluated with Dr Davidson who agrees to above stated plan        Septic shock, suspect line sepsis  79 yo M pmhx ESRD came to ED complaining of fatigue. Patient noted to be febrile to 102.2 in ED with complains of cough and wheezing.  Poor historian and unable to fully participate in ROS. ADmitted to ICU for further management of:    Sepsis  AMS- metabolic encephelopathy  Thrombocytopenia, suspect from Sepsis  Underlying h/o CAD s/p PCI, BPH, ESRD on HD, s/p MVR, Afib      Plan  mental status remains poor, suspect metabolic encephelopathy -  CT head to eval for other causes   Repeat blood cultures sent this AM   remains on low dose pressors  taper pressors  to maintain map > 60  Continue midodrine  Afebrile   antibiotics as per ID, vanco trough 19 this AM will give vanco after HD today  Perm cath removed 10/3/2023 , will need Shiley placed for HD today per nephrology  f/u cultures   continue to hold a/c due to thrombocytopenia   PLT remain in 60s s/p 1 unit PLT on 10/1     Patient is full CODE    Patient seen and evaluated with Dr Davidson who agrees to above stated plan

## 2023-10-04 NOTE — PROGRESS NOTE ADULT - ASSESSMENT
Patient is a 78y male with a past history of A Fib, ESRD, CAD, Mitral valve replacement, and LAAA clip placement  who presented to [facility] on 9/30 in evening with fever and hypotension.  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  He had a tunnelled catheter, he is encephalopathic, unclear how long he has been on dialysis.   High grade MRSA bacteremia. Limited history at this point. He has a tunnelled HDC and a prosthetic mitral valve.  He was treated for MSSA bacteremia in March of 2023, negative ERENDIRA for valvular lesions, PPM removed and leadless micra PPM placed.  He was hospitalized in August with a rectal belld, had "proctitis" and received 10 days of ertapenem for a clostridial bacteremia.  his permacath was removed on 10/2, culture growing staph species.  Agree with cardiology, he is a poor candidate for ERENDIRA at this point and goals of care will guide approach.  Hopefully catheter  removal  will act as a form of source control  Suggest:  1 Await placement of temp HD catheter and resumption of HD  2 Blood cultures sent this AM to assess for clearance  3 Vanco per levels- he is 19, if he is dialyzed would advise 750 post dialysis on a regular basis  4 extent of additional w/u to be determined. If he is not a surgical candidate than medical management will be approach  5 [place] guarded, medical mortality high with present situation     Patient is a 78y male with a past history of A Fib, ESRD, CAD, Mitral valve replacement, and LAAA clip placement  who presented to [facility] on 9/30 in evening with fever and hypotension.  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  He had a tunnelled catheter, he is encephalopathic, unclear how long he has been on dialysis.   High grade MRSA bacteremia. Limited history at this point. He has a tunnelled HDC and a prosthetic mitral valve.  He was treated for MSSA bacteremia in March of 2023, negative ERENDIRA for valvular lesions, PPM removed and leadless micra PPM placed.  He was hospitalized in August with a rectal belld, had "proctitis" and received 10 days of ertapenem for a clostridial bacteremia.  his permacath was removed on 10/2, culture growing staph species.  Agree with cardiology, he is a poor candidate for ERENDIRA at this point and goals of care will guide approach.  Hopefully catheter  removal  will act as a form of source control  Suggest:  1 Await placement of temp HD catheter and resumption of HD  2 Blood cultures sent this AM to assess for clearance  3 Vanco per levels- he is 19, if he is dialyzed would advise 750 post dialysis on a regular basis  4 extent of additional w/u to be determined. If he is not a surgical candidate than medical management will be approach  5 [place] guarded, medical mortality high with present situation     Patient is a 78y male with a past history of A Fib, ESRD, CAD, Mitral valve replacement, and LAAA clip placement  who presented to [facility] on 9/30 in evening with fever and hypotension.  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  He had a tunnelled catheter, he is encephalopathic, unclear how long he has been on dialysis.   High grade MRSA bacteremia. Limited history at this point. He has a tunnelled HDC and a prosthetic mitral valve.  He was treated for MSSA bacteremia in March of 2023, negative ERENDIRA for valvular lesions, PPM removed and leadless micra PPM placed.  He was hospitalized in August with a rectal belld, had "proctitis" and received 10 days of ertapenem for a clostridial bacteremia.  his permacath was removed on 10/2, culture growing staph species.  Agree with cardiology, he is a poor candidate for ERENDIRA at this point and goals of care will guide approach.  Hopefully catheter  removal  will act as a form of source control  Suggest:  1 Await placement of temp HD catheter and resumption of HD  2 Blood cultures sent this AM to assess for clearance  3 Vanco per levels- he is 19, if he is dialyzed would advise 750 post dialysis on a regular basis  4 extent of additional w/u to be determined. If he is not a surgical candidate than medical management will be approach  5 [place] guarded, medical mortality high with present situation

## 2023-10-04 NOTE — PROCEDURE NOTE - NSUS ED ADDITIONAL DETAIL1 FT
#20 G PIV placed in R forearm, attempts 1, tolerated well, tip of catheter vizualized in vessel, + blood return noted, flushes well

## 2023-10-04 NOTE — PROGRESS NOTE ADULT - SUBJECTIVE AND OBJECTIVE BOX
HPI:  79 yo M pmhx ESRD came to ED complaining of fatigue. Patient noted to be febrile to 102.2 in ED with complains of cough and wheezing. Received 1l NS bolus and noted to be hypotensive to 80's systolic. At bedside patient is oriented to self and place. Complains of some abdominal pain but is unsure why he is in the hospital. Poor historian and unable to fully participate in ROS (30 Sep 2023 22:43)      24 hr events: remains on levophed gtt, worsening renal function, needs HD, mental sttaus not improved       ## ROS: poor mentation, offers no complaints    [ x] unable to obtain    ## Labs:  CBC:                        8.7    12.80 )-----------( 62       ( 04 Oct 2023 06:00 )             25.9     Chem:  10-04    130<L>  |  99  |  91<H>  ----------------------------<  94  3.8   |  22  |  5.53<H>    Ca    9.1      04 Oct 2023 06:00  Phos  2.1     10-04  Mg     1.9     10-04    TPro  5.9<L>  /  Alb  2.5<L>  /  TBili  0.7  /  DBili  x   /  AST  86<H>  /  ALT  79<H>  /  AlkPhos  397<H>  10-03      ## Medications:  vancomycin  IVPB 1000 milliGRAM(s) IV Intermittent once  midodrine 10 milliGRAM(s) Oral every 8 hours  norepinephrine Infusion 0.05 MICROgram(s)/kG/Min IV Continuous <Continuous>  levothyroxine 137 MICROGram(s) Oral daily  aspirin  chewable 81 milliGRAM(s) Oral daily  aluminum hydroxide/magnesium hydroxide/simethicone Suspension 10 milliLiter(s) Oral every 6 hours PRN  mesalamine Suppository 1000 milliGRAM(s) Rectal at bedtime  pantoprazole    Tablet 40 milliGRAM(s) Oral before breakfast  polyethylene glycol 3350 17 Gram(s) Oral daily  senna 2 Tablet(s) Oral at bedtime  acetaminophen     Tablet .. 650 milliGRAM(s) Oral every 6 hours PRN      ## Vitals:  T(C): 36.7 (10-04-23 @ 08:00), Max: 37.1 (10-04-23 @ 03:00)  HR: 71 (10-04-23 @ 09:30) (66 - 111)  BP: 97/53 (10-04-23 @ 09:30) (86/48 - 131/57)  BP(mean): 64 (10-04-23 @ 09:30) (59 - 108)  RR: 18 (10-04-23 @ 09:30) (10 - 23)  SpO2: 98% on room air (10-04-23 @ 09:30) (96% - 100%)      10-03 @ 07:01  -  10-04 @ 07:00  --------------------------------------------------------  IN: 205.4 mL / OUT: 0 mL / NET: 205.4 mL    10-04 @ 07:01  -  10-04 @ 09:49  --------------------------------------------------------  IN: 4 mL / OUT: 0 mL / NET: 4 mL      ## P/E:  Gen: lying comfortably in bed in no apparent distress  HEENT: PERRL, EOMI  Resp: CTA B/L no c/r/w  CVS: S1S2 no m/r/g  Abd: soft NT/ND +BS  Ext: no c/c/e  Neuro: A&Ox3    CENTRAL LINE: [ ] YES [ x] NO    SU: [ ] YES [x ] NO        A-LINE:  [ ] YES [x ] NO      CODE STATUS: [x ] full code  [ ] DNR  [ ] DNI  [ ] RUST  Goals of care discussion: [ ] yes  HPI:  79 yo M pmhx ESRD came to ED complaining of fatigue. Patient noted to be febrile to 102.2 in ED with complains of cough and wheezing. Received 1l NS bolus and noted to be hypotensive to 80's systolic. At bedside patient is oriented to self and place. Complains of some abdominal pain but is unsure why he is in the hospital. Poor historian and unable to fully participate in ROS (30 Sep 2023 22:43)      24 hr events: remains on levophed gtt, worsening renal function, needs HD, mental sttaus not improved       ## ROS: poor mentation, offers no complaints    [ x] unable to obtain    ## Labs:  CBC:                        8.7    12.80 )-----------( 62       ( 04 Oct 2023 06:00 )             25.9     Chem:  10-04    130<L>  |  99  |  91<H>  ----------------------------<  94  3.8   |  22  |  5.53<H>    Ca    9.1      04 Oct 2023 06:00  Phos  2.1     10-04  Mg     1.9     10-04    TPro  5.9<L>  /  Alb  2.5<L>  /  TBili  0.7  /  DBili  x   /  AST  86<H>  /  ALT  79<H>  /  AlkPhos  397<H>  10-03      ## Medications:  vancomycin  IVPB 1000 milliGRAM(s) IV Intermittent once  midodrine 10 milliGRAM(s) Oral every 8 hours  norepinephrine Infusion 0.05 MICROgram(s)/kG/Min IV Continuous <Continuous>  levothyroxine 137 MICROGram(s) Oral daily  aspirin  chewable 81 milliGRAM(s) Oral daily  aluminum hydroxide/magnesium hydroxide/simethicone Suspension 10 milliLiter(s) Oral every 6 hours PRN  mesalamine Suppository 1000 milliGRAM(s) Rectal at bedtime  pantoprazole    Tablet 40 milliGRAM(s) Oral before breakfast  polyethylene glycol 3350 17 Gram(s) Oral daily  senna 2 Tablet(s) Oral at bedtime  acetaminophen     Tablet .. 650 milliGRAM(s) Oral every 6 hours PRN      ## Vitals:  T(C): 36.7 (10-04-23 @ 08:00), Max: 37.1 (10-04-23 @ 03:00)  HR: 71 (10-04-23 @ 09:30) (66 - 111)  BP: 97/53 (10-04-23 @ 09:30) (86/48 - 131/57)  BP(mean): 64 (10-04-23 @ 09:30) (59 - 108)  RR: 18 (10-04-23 @ 09:30) (10 - 23)  SpO2: 98% on room air (10-04-23 @ 09:30) (96% - 100%)      10-03 @ 07:01  -  10-04 @ 07:00  --------------------------------------------------------  IN: 205.4 mL / OUT: 0 mL / NET: 205.4 mL    10-04 @ 07:01  -  10-04 @ 09:49  --------------------------------------------------------  IN: 4 mL / OUT: 0 mL / NET: 4 mL      ## P/E:  Gen: lying comfortably in bed in no apparent distress  HEENT: PERRL, EOMI  Resp: CTA B/L no c/r/w  CVS: S1S2 no m/r/g  Abd: soft NT/ND +BS  Ext: no c/c/e  Neuro: A&Ox3    CENTRAL LINE: [ ] YES [ x] NO    SU: [ ] YES [x ] NO        A-LINE:  [ ] YES [x ] NO      CODE STATUS: [x ] full code  [ ] DNR  [ ] DNI  [ ] [facility]  Goals of care discussion: [ ] yes  HPI:  77 yo M pmhx ESRD came to ED complaining of fatigue. Patient noted to be febrile to 102.2 in ED with complains of cough and wheezing. Received 1l NS bolus and noted to be hypotensive to 80's systolic. At bedside patient is oriented to self and place. Complains of some abdominal pain but is unsure why he is in the hospital. Poor historian and unable to fully participate in ROS (30 Sep 2023 22:43)      24 hr events: remains on levophed gtt, worsening renal function, needs HD, mental sttaus not improved       ## ROS: poor mentation, offers no complaints    [ x] unable to obtain    ## Labs:  CBC:                        8.7    12.80 )-----------( 62       ( 04 Oct 2023 06:00 )             25.9     Chem:  10-04    130<L>  |  99  |  91<H>  ----------------------------<  94  3.8   |  22  |  5.53<H>    Ca    9.1      04 Oct 2023 06:00  Phos  2.1     10-04  Mg     1.9     10-04    TPro  5.9<L>  /  Alb  2.5<L>  /  TBili  0.7  /  DBili  x   /  AST  86<H>  /  ALT  79<H>  /  AlkPhos  397<H>  10-03      ## Medications:  vancomycin  IVPB 1000 milliGRAM(s) IV Intermittent once  midodrine 10 milliGRAM(s) Oral every 8 hours  norepinephrine Infusion 0.05 MICROgram(s)/kG/Min IV Continuous <Continuous>  levothyroxine 137 MICROGram(s) Oral daily  aspirin  chewable 81 milliGRAM(s) Oral daily  aluminum hydroxide/magnesium hydroxide/simethicone Suspension 10 milliLiter(s) Oral every 6 hours PRN  mesalamine Suppository 1000 milliGRAM(s) Rectal at bedtime  pantoprazole    Tablet 40 milliGRAM(s) Oral before breakfast  polyethylene glycol 3350 17 Gram(s) Oral daily  senna 2 Tablet(s) Oral at bedtime  acetaminophen     Tablet .. 650 milliGRAM(s) Oral every 6 hours PRN      ## Vitals:  T(C): 36.7 (10-04-23 @ 08:00), Max: 37.1 (10-04-23 @ 03:00)  HR: 71 (10-04-23 @ 09:30) (66 - 111)  BP: 97/53 (10-04-23 @ 09:30) (86/48 - 131/57)  BP(mean): 64 (10-04-23 @ 09:30) (59 - 108)  RR: 18 (10-04-23 @ 09:30) (10 - 23)  SpO2: 98% on room air (10-04-23 @ 09:30) (96% - 100%)      10-03 @ 07:01  -  10-04 @ 07:00  --------------------------------------------------------  IN: 205.4 mL / OUT: 0 mL / NET: 205.4 mL    10-04 @ 07:01  -  10-04 @ 09:49  --------------------------------------------------------  IN: 4 mL / OUT: 0 mL / NET: 4 mL      ## P/E:  Gen: lying comfortably in bed in no apparent distress  HEENT: PERRL, EOMI  Resp: CTA B/L no c/r/w  CVS: S1S2 no m/r/g  Abd: soft NT/ND +BS  Ext: no c/c/e  Neuro: A&Ox3    CENTRAL LINE: [ ] YES [ x] NO    SU: [ ] YES [x ] NO        A-LINE:  [ ] YES [x ] NO      CODE STATUS: [x ] full code  [ ] DNR  [ ] DNI  [ ] [facility]  Goals of care discussion: [ ] yes

## 2023-10-04 NOTE — PROGRESS NOTE ADULT - SUBJECTIVE AND OBJECTIVE BOX
CC: f/u for MRSA bacteremia  date of service 10/4/2023  Patient reports: he is lethargic, not very interactive    REVIEW OF SYSTEMS:  All other review of systems negative (Comprehensive ROS): limited by condition    Antimicrobials Day #  :Vanco per levels, day 4, level 19    Other Medications Reviewed  MEDICATIONS  (STANDING):  aspirin  chewable 81 milliGRAM(s) Oral daily  folic acid 1 milliGRAM(s) Oral daily  hydrocortisone hemorrhoidal Suppository 1 Suppository(s) Rectal two times a day  influenza  Vaccine (HIGH DOSE) 0.7 milliLiter(s) IntraMuscular once  levothyroxine 137 MICROGram(s) Oral daily  mesalamine Suppository 1000 milliGRAM(s) Rectal at bedtime  midodrine 10 milliGRAM(s) Oral every 8 hours  mupirocin 2% Nasal 1 Application(s) Both Nostrils two times a day  norepinephrine Infusion 0.05 MICROgram(s)/kG/Min (6.81 mL/Hr) IV Continuous <Continuous>  pantoprazole    Tablet 40 milliGRAM(s) Oral before breakfast  polyethylene glycol 3350 17 Gram(s) Oral daily  senna 2 Tablet(s) Oral at bedtime    T(F): 98.8 (10-04-23 @ 03:00), Max: 98.8 (10-04-23 @ 03:00)  HR: 77 (10-04-23 @ 07:00)  BP: 100/57 (10-04-23 @ 07:00)  RR: 21 (10-04-23 @ 07:00)  SpO2: 99% (10-04-23 @ 07:00)  Wt(kg): --    PHYSICAL EXAM:  General: lethargic and poorly interactive  Eyes:  anicteric, no conjunctival injection, no discharge  Oropharynx: no lesions or injection 	  Neck: supple, without adenopathy  Lungs: scattered rales  Heart: irregular rate and rhythm;   Abdomen: soft, nondistended, nontender, without mass or organomegaly  Skin: scattered ecchymosis  Extremities: no clubbing, cyanosis, ++ edema  Neurologic:poorly interactive    LAB RESULTS:                        8.7    12.80 )-----------( 62       ( 04 Oct 2023 06:00 )             25.9     10-04    130<L>  |  99  |  91<H>  ----------------------------<  94  3.8   |  22  |  5.53<H>    Ca    9.1      04 Oct 2023 06:00  Phos  2.1     10-04  Mg     1.9     10-04    TPro  5.9<L>  /  Alb  2.5<L>  /  TBili  0.7  /  DBili  x   /  AST  86<H>  /  ALT  79<H>  /  AlkPhos  397<H>  10-03    LIVER FUNCTIONS - ( 03 Oct 2023 15:51 )  Alb: 2.5 g/dL / Pro: 5.9 g/dL / ALK PHOS: 397 U/L / ALT: 79 U/L / AST: 86 U/L / GGT: x           Urinalysis Basic - ( 04 Oct 2023 06:00 )    Color: x / Appearance: x / SG: x / pH: x  Gluc: 94 mg/dL / Ketone: x  / Bili: x / Urobili: x   Blood: x / Protein: x / Nitrite: x   Leuk Esterase: x / RBC: x / WBC x   Sq Epi: x / Non Sq Epi: x / Bacteria: x      MICROBIOLOGY:  RECENT CULTURES:  10-02 @ 16:21 .Catheter + MRSA Bacterimia Permacath Removal Aerobic/Anaerobic Right  IJ Permacath TIP     Greater than or equal to 15 Colonies Gram Positive Cocci in Clusters      09-30 @ 19:15 .Blood Blood-Peripheral     Growth in aerobic and anaerobic bottles: Methicillin Resistant  Staphylococcus aureus  See previous culture  [id number]    Growth in anaerobic bottle: Gram Positive Cocci in Clusters  Growth in aerobic bottle: Gram Positive Cocci in Clusters    09-30 @ 19:10 .Blood Blood-Peripheral Blood Culture PCR  Methicillin resistant Staphylococcus aureus    Growth in aerobic and anaerobic bottles: Methicillin Resistant  Staphylococcus aureus  Direct identification is available within approximately 3-5  hours either by Blood Panel Multiplexed PCR or Direct  MALDI-TOF. Details: [email or web address]    Growth in anaerobic bottle: Gram Positive Cocci in Clusters  Growth in aerobic bottle: Gram Positive Cocci in Clusters        RADIOLOGY REVIEWED:    < from: Xray Chest 1 View- PORTABLE-Routine (Xray Chest 1 View- PORTABLE-Routine .) (10.01.23 @ 08:39) >  IMPRESSION: Cardiomegaly. Right lower lobe infiltrate/pneumonia and   associated effusion. Patchy left lower lobe areas of   infiltrate/atelectasis. Additional findings as above    --- End of Report ---    < end of copied text >  < from: TTE Echo Complete w/o Contrast w/ Doppler (10.01.23 @ 10:26) >  Summary:   1. 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 Biatrial enlargement   4. Left ventricular ejection fraction, by visual estimation, is 45 to   50%.   5. Mild mitral annular calcification.   6. Mild-moderate tricuspid regurgitation.   7. Mild aortic valve stenosis.    < end of copied text >   CC: f/u for MRSA bacteremia  date of service 10/4/2023  Patient reports: he is lethargic, not very interactive    REVIEW OF SYSTEMS:  All other review of systems negative (Comprehensive ROS): limited by condition    Antimicrobials Day #  :Vanco per levels, day 4, level 19    Other Medications Reviewed  MEDICATIONS  (STANDING):  aspirin  chewable 81 milliGRAM(s) Oral daily  folic acid 1 milliGRAM(s) Oral daily  hydrocortisone hemorrhoidal Suppository 1 Suppository(s) Rectal two times a day  influenza  Vaccine (HIGH DOSE) 0.7 milliLiter(s) IntraMuscular once  levothyroxine 137 MICROGram(s) Oral daily  mesalamine Suppository 1000 milliGRAM(s) Rectal at bedtime  midodrine 10 milliGRAM(s) Oral every 8 hours  mupirocin 2% Nasal 1 Application(s) Both Nostrils two times a day  norepinephrine Infusion 0.05 MICROgram(s)/kG/Min (6.81 mL/Hr) IV Continuous <Continuous>  pantoprazole    Tablet 40 milliGRAM(s) Oral before breakfast  polyethylene glycol 3350 17 Gram(s) Oral daily  senna 2 Tablet(s) Oral at bedtime    T(F): 98.8 (10-04-23 @ 03:00), Max: 98.8 (10-04-23 @ 03:00)  HR: 77 (10-04-23 @ 07:00)  BP: 100/57 (10-04-23 @ 07:00)  RR: 21 (10-04-23 @ 07:00)  SpO2: 99% (10-04-23 @ 07:00)  Wt(kg): --    PHYSICAL EXAM:  General: lethargic and poorly interactive  Eyes:  anicteric, no conjunctival injection, no discharge  Oropharynx: no lesions or injection 	  Neck: supple, without adenopathy  Lungs: scattered rales  Heart: irregular rate and rhythm;   Abdomen: soft, nondistended, nontender, without mass or organomegaly  Skin: scattered ecchymosis  Extremities: no clubbing, cyanosis, ++ edema  Neurologic:poorly interactive    LAB RESULTS:                        8.7    12.80 )-----------( 62       ( 04 Oct 2023 06:00 )             25.9     10-04    130<L>  |  99  |  91<H>  ----------------------------<  94  3.8   |  22  |  5.53<H>    Ca    9.1      04 Oct 2023 06:00  Phos  2.1     10-04  Mg     1.9     10-04    TPro  5.9<L>  /  Alb  2.5<L>  /  TBili  0.7  /  DBili  x   /  AST  86<H>  /  ALT  79<H>  /  AlkPhos  397<H>  10-03    LIVER FUNCTIONS - ( 03 Oct 2023 15:51 )  Alb: 2.5 g/dL / Pro: 5.9 g/dL / ALK PHOS: 397 U/L / ALT: 79 U/L / AST: 86 U/L / GGT: x           Urinalysis Basic - ( 04 Oct 2023 06:00 )    Color: x / Appearance: x / SG: x / pH: x  Gluc: 94 mg/dL / Ketone: x  / Bili: x / Urobili: x   Blood: x / Protein: x / Nitrite: x   Leuk Esterase: x / RBC: x / WBC x   Sq Epi: x / Non Sq Epi: x / Bacteria: x      MICROBIOLOGY:  RECENT CULTURES:  10-02 @ 16:21 .Catheter + MRSA Bacterimia Permacath Removal Aerobic/Anaerobic Right  IJ Permacath TIP     Greater than or equal to 15 Colonies Gram Positive Cocci in Clusters      09-30 @ 19:15 .Blood Blood-Peripheral     Growth in aerobic and anaerobic bottles: Methicillin Resistant  Staphylococcus aureus  See previous culture  [id number]    Growth in anaerobic bottle: Gram Positive Cocci in Clusters  Growth in aerobic bottle: Gram Positive Cocci in Clusters    09-30 @ 19:10 .Blood Blood-Peripheral Blood Culture PCR  Methicillin resistant Staphylococcus aureus    Growth in aerobic and anaerobic bottles: Methicillin Resistant  Staphylococcus aureus  Direct identification is available within approximately 3-5  hours either by Blood Panel Multiplexed PCR or Direct  MALDI-TOF. Details: [email or web address]    Growth in anaerobic bottle: Gram Positive Cocci in Clusters  Growth in aerobic bottle: Gram Positive Cocci in Clusters        RADIOLOGY REVIEWED:    < from: Xray Chest 1 View- PORTABLE-Routine (Xray Chest 1 View- PORTABLE-Routine .) (10.01.23 @ 08:39) >  IMPRESSION: Cardiomegaly. Right lower lobe infiltrate/pneumonia and   associated effusion. Patchy left lower lobe areas of   infiltrate/atelectasis. Additional findings as above    --- End of Report ---    < end of copied text >  < from: TTE Echo Complete w/o Contrast w/ Doppler (10.01.23 @ 10:26) >  Summary:   1. 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 Biatrial enlargement   4. Left ventricular ejection fraction, by visual estimation, is 45 to   50%.   5. Mild mitral annular calcification.   6. Mild-moderate tricuspid regurgitation.   7. Mild aortic valve stenosis.    < end of copied text >   CC: f/u for MRSA bacteremia  date of service 10/4/2023  Patient reports: he is lethargic, not very interactive    REVIEW OF SYSTEMS:  All other review of systems negative (Comprehensive ROS): limited by condition    Antimicrobials Day #  :Vanco per levels, day 4, level 19    Other Medications Reviewed  MEDICATIONS  (STANDING):  aspirin  chewable 81 milliGRAM(s) Oral daily  folic acid 1 milliGRAM(s) Oral daily  hydrocortisone hemorrhoidal Suppository 1 Suppository(s) Rectal two times a day  influenza  Vaccine (HIGH DOSE) 0.7 milliLiter(s) IntraMuscular once  levothyroxine 137 MICROGram(s) Oral daily  mesalamine Suppository 1000 milliGRAM(s) Rectal at bedtime  midodrine 10 milliGRAM(s) Oral every 8 hours  mupirocin 2% Nasal 1 Application(s) Both Nostrils two times a day  norepinephrine Infusion 0.05 MICROgram(s)/kG/Min (6.81 mL/Hr) IV Continuous <Continuous>  pantoprazole    Tablet 40 milliGRAM(s) Oral before breakfast  polyethylene glycol 3350 17 Gram(s) Oral daily  senna 2 Tablet(s) Oral at bedtime    T(F): 98.8 (10-04-23 @ 03:00), Max: 98.8 (10-04-23 @ 03:00)  HR: 77 (10-04-23 @ 07:00)  BP: 100/57 (10-04-23 @ 07:00)  RR: 21 (10-04-23 @ 07:00)  SpO2: 99% (10-04-23 @ 07:00)  Wt(kg): --    PHYSICAL EXAM:  General: lethargic and poorly interactive  Eyes:  anicteric, no conjunctival injection, no discharge  Oropharynx: no lesions or injection 	  Neck: supple, without adenopathy  Lungs: scattered rales  Heart: irregular rate and rhythm;   Abdomen: soft, nondistended, nontender, without mass or organomegaly  Skin: scattered ecchymosis  Extremities: no clubbing, cyanosis, ++ edema  Neurologic:poorly interactive    LAB RESULTS:                        8.7    12.80 )-----------( 62       ( 04 Oct 2023 06:00 )             25.9     10-04    130<L>  |  99  |  91<H>  ----------------------------<  94  3.8   |  22  |  5.53<H>    Ca    9.1      04 Oct 2023 06:00  Phos  2.1     10-04  Mg     1.9     10-04    TPro  5.9<L>  /  Alb  2.5<L>  /  TBili  0.7  /  DBili  x   /  AST  86<H>  /  ALT  79<H>  /  AlkPhos  397<H>  10-03    LIVER FUNCTIONS - ( 03 Oct 2023 15:51 )  Alb: 2.5 g/dL / Pro: 5.9 g/dL / ALK PHOS: 397 U/L / ALT: 79 U/L / AST: 86 U/L / GGT: x           Urinalysis Basic - ( 04 Oct 2023 06:00 )    Color: x / Appearance: x / SG: x / pH: x  Gluc: 94 mg/dL / Ketone: x  / Bili: x / Urobili: x   Blood: x / Protein: x / Nitrite: x   Leuk Esterase: x / RBC: x / WBC x   Sq Epi: x / Non Sq Epi: x / Bacteria: x      MICROBIOLOGY:  RECENT CULTURES:  10-02 @ 16:21 .Catheter + MRSA Bacterimia Permacath Removal Aerobic/Anaerobic Right  IJ Permacath TIP     Greater than or equal to 15 Colonies Gram Positive Cocci in Clusters      09-30 @ 19:15 .Blood Blood-Peripheral     Growth in aerobic and anaerobic bottles: Methicillin Resistant  Staphylococcus aureus  See previous culture  [id number]    Growth in anaerobic bottle: Gram Positive Cocci in Clusters  Growth in aerobic bottle: Gram Positive Cocci in Clusters    09-30 @ 19:10 .Blood Blood-Peripheral Blood Culture PCR  Methicillin resistant Staphylococcus aureus    Growth in aerobic and anaerobic bottles: Methicillin Resistant  Staphylococcus aureus  Direct identification is available within approximately 3-5  hours either by Blood Panel Multiplexed PCR or Direct  MALDI-TOF. Details: [email or web address]    Growth in anaerobic bottle: Gram Positive Cocci in Clusters  Growth in aerobic bottle: Gram Positive Cocci in Clusters        RADIOLOGY REVIEWED:    < from: Xray Chest 1 View- PORTABLE-Routine (Xray Chest 1 View- PORTABLE-Routine .) (10.01.23 @ 08:39) >  IMPRESSION: Cardiomegaly. Right lower lobe infiltrate/pneumonia and   associated effusion. Patchy left lower lobe areas of   infiltrate/atelectasis. Additional findings as above    --- End of Report ---    < end of copied text >  < from: TTE Echo Complete w/o Contrast w/ Doppler (10.01.23 @ 10:26) >  Summary:   1. 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 Biatrial enlargement   4. Left ventricular ejection fraction, by visual estimation, is 45 to   50%.   5. Mild mitral annular calcification.   6. Mild-moderate tricuspid regurgitation.   7. Mild aortic valve stenosis.    < end of copied text >

## 2023-10-04 NOTE — PROGRESS NOTE ADULT - ASSESSMENT
Assessment:  Liam Garza is a 78 year old man with past medical history of Coronary artery disease (s/p CABG), Bioprosthetic MVR, ANAHI closure with AtriClip in 2021, Cardiomyopathy, Atrial fibrillation, End stage renal disease with prior hospitalization in March at [facility] with pacemaker extraction secondary to MSSA bacteremia with replacement with Micra PPM, then recent hospitalization at [facility] in August for rectal bleeding now presented with fever and cough, found to have septic shock, requiring pressor support, currently admitted to ICU.    Cardiology consulted to evaluate for endocarditis. ECG consistent with atrial fibrillation and old inferior infarct, no acute ischemic ST abnormalities. Echo report consistent with LVEF 45-50%, mild-moderate tricuspid regurgitation and mild aortic valve stenosis. The patient was evaluated at bedside, appears encephalopathic.     Recommendations:  [] Septic shock with gram positive bacteremia: Now s/p removal of tunneled dialysis catheter. Infectious workup per ID, Chest X ray consistent with right lung pneumonia. ERENDIRA appears to be contraindicated at this time due to altered mental status/encephalopathic, hemodynamic instability with shock on pressor support and severe thrombocytopenia with high risk for traumatic bleeding with ERENDIRA probe insertion. Discussed with ID/Dr. Kat who agrees that patient is poor candidate for ERENDIRA at this point. Can re-evaluate if ERENDIRA needed if patient is longer in septic shock and if thrombocytopenia resolves, otherwise recommend goals of care discussion with patient's family as prognosis appears guarded. Continue IV antibiotics per ID. Will review echo images.    Discussed with ICU team. Discussed with ID/Dr. Kat. Will sign out this case to cardiologist to follow along tomorrow.     Thu Bass MD  Cardiology      Assessment:  Liam Garza is a 78 year old man with past medical history of Coronary artery disease (s/p CABG), Bioprosthetic MVR, ANAHI closure with AtriClip in 2021, Cardiomyopathy, Atrial fibrillation, End stage renal disease with prior hospitalization in March at [facility] with pacemaker extraction secondary to MSSA bacteremia with replacement with Micra PPM, then recent hospitalization at [facility] in August for rectal bleeding now presented with fever and cough, found to have septic shock, requiring pressor support, currently admitted to ICU.    Cardiology consulted to evaluate for endocarditis. ECG consistent with atrial fibrillation and old inferior infarct, no acute ischemic ST abnormalities. Echo report consistent with LVEF 45-50%, mild-moderate tricuspid regurgitation and mild aortic valve stenosis. The patient was evaluated at bedside, appears encephalopathic.     Recommendations:  [] Septic shock with gram positive bacteremia: Now s/p removal of tunneled dialysis catheter. Infectious workup per ID, Chest X ray consistent with right lung pneumonia. ERENDIRA appears to be contraindicated at this time due to altered mental status/encephalopathic, hemodynamic instability with shock on pressor support and severe thrombocytopenia with high risk for traumatic bleeding with ERENDIRA probe insertion. Discussed with ID/Dr. Kat who agrees that patient is poor candidate for ERENDIRA at this point. Can re-evaluate if ERENDIRA needed if patient is longer in septic shock and if thrombocytopenia resolves, otherwise recommend goals of care discussion with patient's family as prognosis appears guarded. Continue IV antibiotics per ID. Will review echo images.    Discussed with ICU team. Discussed with ID/Dr. Kat. Will sign out this case to cardiologist to follow along tomorrow.     Thu Bass MD  Cardiology      Assessment:  Liam Garza is a 78 year old man with past medical history of Coronary artery disease (s/p CABG), Bioprosthetic MVR, ANAHI closure with AtriClip in 2021, Cardiomyopathy, Atrial fibrillation, End stage renal disease with prior hospitalization in March at [facility] with pacemaker extraction secondary to MSSA bacteremia with replacement with Micra PPM, then recent hospitalization at [facility] in August for rectal bleeding now presented with fever and cough, found to have septic shock, requiring pressor support, currently admitted to ICU.    Cardiology consulted to evaluate for endocarditis. ECG consistent with atrial fibrillation and old inferior infarct, no acute ischemic ST abnormalities. Echo report consistent with LVEF 45-50%, mild-moderate tricuspid regurgitation and mild aortic valve stenosis. The patient was evaluated at bedside, appears encephalopathic.     Recommendations:  [] Septic shock with gram positive bacteremia: Now s/p removal of tunneled dialysis catheter. Infectious workup per ID, Chest X ray consistent with right lung pneumonia. ERENDIRA appears to be contraindicated at this time due to altered mental status/encephalopathic, hemodynamic instability with shock on pressor support and severe thrombocytopenia with high risk for traumatic bleeding with ERENDIRA probe insertion. Discussed with ID/Dr. Kat who agrees that patient is poor candidate for ERENDIRA at this point. Can re-evaluate if ERENDIRA needed if patient is longer in septic shock and if thrombocytopenia resolves, otherwise recommend goals of care discussion with patient's family as prognosis appears guarded. Continue IV antibiotics per ID. Will review echo images.    Discussed with ICU team. Discussed with ID/Dr. Kat. Will sign out this case to cardiologist to follow along tomorrow.     Thu Bass MD  Cardiology

## 2023-10-04 NOTE — PROGRESS NOTE ADULT - ASSESSMENT
79 yo m pmhx Afib on Eliquis, CAD, MVR, ESRD on HD via tunneled catheter admitted with septic shock 2/2 PNA and MRSA bacteremia s/p tunneled HD cath removal by vascular.    -F/u OR cx  -C/w Vancomycin  -Titrate Levophed as able  -Sutures removed  -Repeat blood cx   -For Sofía today  -Restart Eliquis when able  -Appreciate excellent SICU care    Surgery 77 yo m pmhx Afib on Eliquis, CAD, MVR, ESRD on HD via tunneled catheter admitted with septic shock 2/2 PNA and MRSA bacteremia s/p tunneled HD cath removal by vascular.    -F/u OR cx  -C/w Vancomycin  -Titrate Levophed as able  -Sutures removed  -Repeat blood cx   -For Sofía today  -Restart Eliquis when able  -Appreciate excellent SICU care    Surgery

## 2023-10-05 ENCOUNTER — APPOINTMENT (OUTPATIENT)
Dept: VASCULAR SURGERY | Facility: CLINIC | Age: 78
End: 2023-10-05

## 2023-10-05 LAB
-  AMPICILLIN/SULBACTAM: SIGNIFICANT CHANGE UP
-  CEFAZOLIN: SIGNIFICANT CHANGE UP
-  CLINDAMYCIN: SIGNIFICANT CHANGE UP
-  DAPTOMYCIN: SIGNIFICANT CHANGE UP
-  ERYTHROMYCIN: SIGNIFICANT CHANGE UP
-  GENTAMICIN: SIGNIFICANT CHANGE UP
-  LINEZOLID: SIGNIFICANT CHANGE UP
-  OXACILLIN: SIGNIFICANT CHANGE UP
-  PENICILLIN: SIGNIFICANT CHANGE UP
-  RIFAMPIN: SIGNIFICANT CHANGE UP
-  TETRACYCLINE: SIGNIFICANT CHANGE UP
-  TRIMETHOPRIM/SULFAMETHOXAZOLE: SIGNIFICANT CHANGE UP
-  VANCOMYCIN: SIGNIFICANT CHANGE UP
ANION GAP SERPL CALC-SCNC: 9 MMOL/L — SIGNIFICANT CHANGE UP (ref 5–17)
BUN SERPL-MCNC: 66 MG/DL — HIGH (ref 7–23)
CALCIUM SERPL-MCNC: 9.5 MG/DL — SIGNIFICANT CHANGE UP (ref 8.4–10.5)
CHLORIDE SERPL-SCNC: 100 MMOL/L — SIGNIFICANT CHANGE UP (ref 96–108)
CO2 SERPL-SCNC: 25 MMOL/L — SIGNIFICANT CHANGE UP (ref 22–31)
CREAT SERPL-MCNC: 4.43 MG/DL — HIGH (ref 0.5–1.3)
CULTURE RESULTS: SIGNIFICANT CHANGE UP
EGFR: 13 ML/MIN/1.73M2 — LOW
GLUCOSE SERPL-MCNC: 110 MG/DL — HIGH (ref 70–99)
GRAM STN FLD: SIGNIFICANT CHANGE UP
HCT VFR BLD CALC: 23.7 % — LOW (ref 39–50)
HGB BLD-MCNC: 8.3 G/DL — LOW (ref 13–17)
MAGNESIUM SERPL-MCNC: 1.9 MG/DL — SIGNIFICANT CHANGE UP (ref 1.6–2.6)
MCHC RBC-ENTMCNC: 30.9 PG — SIGNIFICANT CHANGE UP (ref 27–34)
MCHC RBC-ENTMCNC: 35 GM/DL — SIGNIFICANT CHANGE UP (ref 32–36)
MCV RBC AUTO: 88.1 FL — SIGNIFICANT CHANGE UP (ref 80–100)
METHOD TYPE: SIGNIFICANT CHANGE UP
NRBC # BLD: 0 /100 WBCS — SIGNIFICANT CHANGE UP (ref 0–0)
ORGANISM # SPEC MICROSCOPIC CNT: SIGNIFICANT CHANGE UP
PHOSPHATE SERPL-MCNC: 2.5 MG/DL — SIGNIFICANT CHANGE UP (ref 2.5–4.5)
PLATELET # BLD AUTO: 75 K/UL — LOW (ref 150–400)
POTASSIUM SERPL-MCNC: 3.7 MMOL/L — SIGNIFICANT CHANGE UP (ref 3.5–5.3)
POTASSIUM SERPL-SCNC: 3.7 MMOL/L — SIGNIFICANT CHANGE UP (ref 3.5–5.3)
RBC # BLD: 2.69 M/UL — LOW (ref 4.2–5.8)
RBC # FLD: 15.9 % — HIGH (ref 10.3–14.5)
SODIUM SERPL-SCNC: 134 MMOL/L — LOW (ref 135–145)
SPECIMEN SOURCE: SIGNIFICANT CHANGE UP
VANCOMYCIN FLD-MCNC: 23.9 UG/ML — SIGNIFICANT CHANGE UP
WBC # BLD: 13.26 K/UL — HIGH (ref 3.8–10.5)
WBC # FLD AUTO: 13.26 K/UL — HIGH (ref 3.8–10.5)

## 2023-10-05 PROCEDURE — 71045 X-RAY EXAM CHEST 1 VIEW: CPT | Mod: 26,76

## 2023-10-05 PROCEDURE — 99232 SBSQ HOSP IP/OBS MODERATE 35: CPT

## 2023-10-05 RX ADMIN — MIDODRINE HYDROCHLORIDE 10 MILLIGRAM(S): 2.5 TABLET ORAL at 12:23

## 2023-10-05 RX ADMIN — Medication 1 SUPPOSITORY(S): at 17:27

## 2023-10-05 RX ADMIN — Medication 1000 MILLIGRAM(S): at 21:23

## 2023-10-05 RX ADMIN — MUPIROCIN 1 APPLICATION(S): 20 OINTMENT TOPICAL at 05:36

## 2023-10-05 RX ADMIN — Medication 81 MILLIGRAM(S): at 12:24

## 2023-10-05 RX ADMIN — Medication 6.81 MICROGRAM(S)/KG/MIN: at 01:33

## 2023-10-05 RX ADMIN — MUPIROCIN 1 APPLICATION(S): 20 OINTMENT TOPICAL at 17:26

## 2023-10-05 RX ADMIN — Medication 1 SUPPOSITORY(S): at 05:37

## 2023-10-05 RX ADMIN — POLYETHYLENE GLYCOL 3350 17 GRAM(S): 17 POWDER, FOR SOLUTION ORAL at 12:24

## 2023-10-05 RX ADMIN — Medication 1 MILLIGRAM(S): at 12:22

## 2023-10-05 RX ADMIN — SENNA PLUS 2 TABLET(S): 8.6 TABLET ORAL at 21:23

## 2023-10-05 RX ADMIN — MIDODRINE HYDROCHLORIDE 10 MILLIGRAM(S): 2.5 TABLET ORAL at 21:23

## 2023-10-05 NOTE — PROGRESS NOTE ADULT - NUTRITIONAL ASSESSMENT
This patient has been assessed with a concern for Malnutrition and has been determined to have a diagnosis/diagnoses of Moderate protein-calorie malnutrition.    This patient is being managed with:   Diet NPO with Tube Feed-  Tube Feeding Modality: Nasogastric  Nepro with Carb Steady  Total Volume for 24 Hours (mL): 1170  Continuous  Starting Tube Feed Rate {mL per Hour}: 30  Increase Tube Feed Rate by (mL): 10     Every 4 hours  Until Goal Tube Feed Rate (mL per Hour): 65  Tube Feed Duration (in Hours): 18  Tube Feed Start Time: 10:00  Tube Feed Stop Time: 04:00  Entered: Oct  5 2023 12:41PM

## 2023-10-05 NOTE — PROVIDER CONTACT NOTE (CRITICAL VALUE NOTIFICATION) - NAME OF MD/NP/PA/DO NOTIFIED:
Patient : Rebeca Delaney Age: 64 year old Sex: female   MRN: [id number] Encounter Date: 2/22/2023    History     Chief Complaint   Patient presents with   • Shoulder Pain       HPI    Rebeca Delaney is a 64 year old presenting to the emergency department for evaluation with concerns after a fall.  Patient states she was walking outside her house and slipped on ice falling on the concrete floor in  her garage.  She states she fell forward and braced herself with her right arm.  She is complaining of right shoulder pain.  The patient states she had previous knee replacement surgeries and was having difficulty getting up ground secondary to this.  She denies striking her head.  She denies loss of consciousness.  She denies headache or neck pain.  She is alert oriented x3 with GCS of 15 on arrival.  She is moving all 4 extremities but is reluctant to move her right arm secondary to pain.      Allergies   Allergen Reactions   • Penicillins RASH   • Wellbutrin [Bupropion] Other (See Comments)     shakiness       No current facility-administered medications for this encounter.     Current Outpatient Medications   Medication Sig   • apixaBAN (Eliquis) 5 MG Tab Take 1 tablet by mouth every 12 hours.   • levothyroxine 75 MCG tablet Take 1 tablet by mouth daily.   • escitalopram (LEXAPRO) 20 MG tablet Take 1 tablet by mouth daily.   • rosuvastatin (CRESTOR) 10 MG tablet Take 1 tablet by mouth daily.   • Cholecalciferol (VITAMIN D3 PO) Take by mouth daily.   • clindamycin (CLEOCIN) 300 MG capsule Take 2 tablets one hour prior to dental appointments.   • acetaminophen (TYLENOL) 500 MG tablet Take 2 tablets by mouth every 8 hours.       Past Medical History:   Diagnosis Date   • Arthritis     Severe medial compartment arthritis   • DDD (degenerative disc disease), lumbar     mild to moderate lower lumbar   • Depression 9/24/2016   • Depression 10/28/2017   • Hypercholesterolemia    • Hypothyroidism 9/24/2016   •  Obesity (BMI 30-39.9) 2016       Past Surgical History:   Procedure Laterality Date   • Total knee replacement Right 2019    Kurtin, Right TKA, [employer] implants   • Total knee replacement Left 09/15/2021    Kurtin, Left TKA, [employer] implants       No family history on file.    Social History     Tobacco Use   • Smoking status: Former     Packs/day: 1.00     Years: 35.00     Pack years: 35.00     Types: Cigarettes     Quit date: 2014     Years since quittin.6   • Smokeless tobacco: Never   Vaping Use   • Vaping Use: never used   Substance Use Topics   • Alcohol use: No     Alcohol/week: 0.0 standard drinks   • Drug use: No       Review of Systems     Review of Systems Full 10 point review of systems performed and is otherwise negative unless listed in HPI.      Physical Exam     ED Triage Vitals [233]   ED Triage Vitals Group      Temp 97.7 °F (36.5 °C)      Heart Rate 80      Resp 16      BP (!) 177/86      SpO2 99 %      EtCO2 mmHg       Height 5' 6\" (1.676 m)      Weight 270 lb (122.5 kg)      Weight Scale Used ED Estimate      BMI (Calculated) 43.58      IBW/kg (Calculated) 59.3       Physical Exam  Vitals and nursing note reviewed.   Constitutional:       Appearance: Normal appearance.   HENT:      Head: Normocephalic and atraumatic.      Nose: Nose normal.      Mouth/Throat:      Mouth: Mucous membranes are moist.   Eyes:      Extraocular Movements: Extraocular movements intact.      Pupils: Pupils are equal, round, and reactive to light.   Cardiovascular:      Rate and Rhythm: Normal rate and regular rhythm.      Pulses: Normal pulses.      Heart sounds: Normal heart sounds.   Pulmonary:      Effort: Pulmonary effort is normal.      Breath sounds: Normal breath sounds.   Abdominal:      General: Abdomen is flat.      Palpations: Abdomen is soft.   Musculoskeletal:         General: Normal range of motion.      Right upper arm: Tenderness present. No deformity or  lacerations.      Left upper arm: Normal.      Right elbow: Normal.      Left elbow: Normal.      Right wrist: Normal. No snuff box tenderness.      Cervical back: Normal range of motion. No tenderness.   Skin:     General: Skin is warm and dry.      Capillary Refill: Capillary refill takes less than 2 seconds.   Neurological:      General: No focal deficit present.      Mental Status: She is alert and oriented to person, place, and time.      Cranial Nerves: No cranial nerve deficit.      Sensory: No sensory deficit.      Comments: 5/5 Muscle strength with , wrist extension, 2nd through 5th digit abduction, thumb the pointer finger opposition on the right with pain.  Patient has full sensation over the right lateral deltoid, dorsal web interspace between thumb and pointer finger, and tips of the 2nd and 5th digits.   Psychiatric:         Mood and Affect: Mood normal.         Behavior: Behavior normal.           Procedures     Procedures    Lab Results     No results found for this visit on 02/22/23.      Radiology Results     Imaging Results          XR Shoulder 3 View Right (Final result)  Result time 02/22/23 23:05:31    Final result                 Impression:    IMPRESSION:    1.  No acute osseous findings.  2.  Moderate right glenohumeral osteoarthritis.             Narrative:    EXAM: XR SHOULDER 3 VW RIGHT    DATE/TIME: 2/22/2023 10:59 PM.     INDICATION: Pain or Swelling.    COMPARISON: None available.    FINDINGS:     No acute fracture or dislocation. Moderate joint space narrowing within the  glenohumeral joint. Small bony spurs arise from the inferior glenoid rim  and humeral head. The soft tissues are unremarkable. No radiopaque foreign  bodies.                                  ED Medications     ED Medication Orders (From admission, onward)    None          ED Course     Vitals:    02/22/23 2213   BP: (!) 177/86   Pulse: 80   Resp: 16   Temp: 97.7 °F (36.5 °C)   TempSrc: Oral   SpO2: 99%   Weight:  122.5 kg (270 lb)   Height: 5' 6\" (1.676 m)              Medical Decision Making  Patient was seen and examined.  Vital signs reviewed.  Patient evaluated after a fall where she slipped on snow and ice at home.  Patient denies striking her head.  She has no focal neuro deficits on evaluation.  The patient is complaining of right shoulder pain.  X-ray was obtained.  I did review the images myself and did not appreciate acute fracture or dislocation pending official radiology read.  Patient was advised of workup and is discharged in stable condition.  She is advised follow up with Orthopedic surgery for further evaluation of ongoing pain in her right shoulder.  She also knows she can return for worsening symptoms or if any concerns.  Advised to use Tylenol as well as ice for symptomatic relief.                                       Disposition       Clinical Impression and Diagnosis  3:19 AM       ED Diagnoses     Diagnosis Comment Associated Orders       Final diagnoses    Acute pain of right shoulder --  SERVICE TO ORTHOPEDICS      Fall in home, initial encounter --  SERVICE TO ORTHOPEDICS            The patient was provided with a recommendation to follow up with a primary care provider and obtain reassessment of his/her blood pressure within three months.    Follow Up:  No follow-up provider specified.        Summary of your Discharge Medications      You have not been prescribed any medications.         Pt is discharged to home/self care in stable condition.                There is no disposition no dispo time  There is no comment                   Alfredo Medrano DO  02/23/23 0319     MARY Coello

## 2023-10-05 NOTE — PROGRESS NOTE ADULT - ASSESSMENT
78y Male PMH of ESRD on HD via tunneled catheter admitted with septic shock 2/2 to PNA and/or line sepsis (tunnelled HD catheter he arrived with).  -patient transferred to ICU for AMs and continued hypotension, required initiation of vasopressors    Interval Hx:  -had NGT placed today for feeds/meds  -remains on pressors           PLAN:        PLAN:    -patient currently on Levophed, will titrate for MAP 65-70  -patient started on midodrine, has helped lwoer pressor need will continue until off pressors  -if remains on pressors by Am would consider starting stress dose steroids   -repeat lactate  -MRSA bacteremia noted, will dose vanco by lei westfall if level <20 in Am will give 1 gram of IVPB vanco as per ID note   -had TTE, no vegitations noted   -follow cultures and narrow antibitoics as able  -new temp. HD cath placed, patient was dialyzed today, continue to follow renal recs regarding HD  -had some runs of v-tach, currently sinus rhythm, cards is following no acute interventions planned. possible ERENDIRA if sepsis resolved  -goal UOP >0.5 cc/kg/hr         TIME SPENT:  35 minutes of  time spent providing medical care for patient's acute illness/conditions that impairs at least one vital organ system and/or poses a high risk of imminent or life threatening deterioration in the patient's condition. It includes time spent evaluating and treating the patient's acute illness as well as time spent reviewing labs, radiology, discussing goals of care with patient and/or patient's family, and discussing the case with a multidisciplinary team, including the eICU, in an effort to prevent further life threatening deterioration or end organ damage. This time is independent of any procedures performed.

## 2023-10-05 NOTE — PROGRESS NOTE ADULT - SUBJECTIVE AND OBJECTIVE BOX
CC: f/u for MRSA bacteremia  Date of service 10/5/2023  Patient reports: he is lethargic but a little more awake than yesterday. He received HD yesterday from a temp IJ catheter    REVIEW OF SYSTEMS:  All other review of systems negative (Comprehensive ROS): limited by condition, NG tube being placed    Antimicrobials Day #  :Vanco per levels-23-day 5    Other Medications Reviewed  MEDICATIONS  (STANDING):  aspirin  chewable 81 milliGRAM(s) Oral daily  epoetin val (PROCRIT) Injectable 83364 Unit(s) IV Push <User Schedule>  folic acid 1 milliGRAM(s) Oral daily  hydrocortisone hemorrhoidal Suppository 1 Suppository(s) Rectal two times a day  influenza  Vaccine (HIGH DOSE) 0.7 milliLiter(s) IntraMuscular once  levothyroxine 137 MICROGram(s) Oral daily  mesalamine Suppository 1000 milliGRAM(s) Rectal at bedtime  midodrine 10 milliGRAM(s) Oral every 8 hours  mupirocin 2% Nasal 1 Application(s) Both Nostrils two times a day  norepinephrine Infusion 0.05 MICROgram(s)/kG/Min (6.81 mL/Hr) IV Continuous <Continuous>  pantoprazole    Tablet 40 milliGRAM(s) Oral before breakfast  polyethylene glycol 3350 17 Gram(s) Oral daily  senna 2 Tablet(s) Oral at bedtime    T(F): 97.8 (10-05-23 @ 12:00), Max: 97.9 (10-04-23 @ 15:00)  HR: 82 (10-05-23 @ 12:30)  BP: 92/50 (10-05-23 @ 12:30)  RR: 18 (10-05-23 @ 12:30)  SpO2: 100% (10-05-23 @ 12:30)  Wt(kg): --    PHYSICAL EXAM:  General: lethargic no acute distress  Eyes:  anicteric, no conjunctival injection, no discharge  Oropharynx: no lesions or injection 	  Neck: supple, without adenopathy, IJ HD catheter  Lungs: clear to auscultation, decreased at bases  Heart: regular rate and rhythm; no murmur,  Abdomen: soft, nondistended, nontender, without mass or organomegaly  Skin: no rash  Extremities: no clubbing, cyanosis, ++ edema  Neurologic: lethargic, marginally interactive    LAB RESULTS:                        8.3    13.26 )-----------( 75       ( 05 Oct 2023 05:30 )             23.7     10-05    134<L>  |  100  |  66<H>  ----------------------------<  110<H>  3.7   |  25  |  4.43<H>    Ca    9.5      05 Oct 2023 05:30  Phos  2.5     10-05  Mg     1.9     10-05    TPro  5.9<L>  /  Alb  2.5<L>  /  TBili  0.7  /  DBili  x   /  AST  86<H>  /  ALT  79<H>  /  AlkPhos  397<H>  10-03    LIVER FUNCTIONS - ( 03 Oct 2023 15:51 )  Alb: 2.5 g/dL / Pro: 5.9 g/dL / ALK PHOS: 397 U/L / ALT: 79 U/L / AST: 86 U/L / GGT: x           Urinalysis Basic - ( 05 Oct 2023 05:30 )    Color: x / Appearance: x / SG: x / pH: x  Gluc: 110 mg/dL / Ketone: x  / Bili: x / Urobili: x   Blood: x / Protein: x / Nitrite: x   Leuk Esterase: x / RBC: x / WBC x   Sq Epi: x / Non Sq Epi: x / Bacteria: x      MICROBIOLOGY:  RECENT CULTURES:  10-04 @ 06:00 .Blood Blood     Growth in aerobic bottle: Gram Positive Cocci in Clusters  Growth in anaerobic bottle: Gram Positive Cocci in Clusters    Growth in aerobic bottle: Gram Positive Cocci in Clusters  Growth in anaerobic bottle: Gram Positive Cocci in Clusters    10-02 @ 16:21 .Catheter + MRSA Bacterimia Permacath Removal Aerobic/Anaerobic Right  IJ Permacath TIP     Greater than or equal to 15 Colonies Staphylococcus aureus      09-30 @ 19:15 .Blood Blood-Peripheral     Growth in aerobic and anaerobic bottles: Methicillin Resistant  Staphylococcus aureus  See previous culture  [id number]    Growth in anaerobic bottle: Gram Positive Cocci in Clusters  Growth in aerobic bottle: Gram Positive Cocci in Clusters    09-30 @ 19:10 .Blood Blood-Peripheral Blood Culture PCR  Methicillin resistant Staphylococcus aureus    Growth in aerobic and anaerobic bottles: Methicillin Resistant  Staphylococcus aureus  Direct identification is available within approximately 3-5  hours either by Blood Panel Multiplexed PCR or Direct  MALDI-TOF. Details: [email or web address]    Growth in anaerobic bottle: Gram Positive Cocci in Clusters  Growth in aerobic bottle: Gram Positive Cocci in Clusters        RADIOLOGY REVIEWED:  < from: Xray Chest 1 View AP/PA (10.05.23 @ 12:20) >  INTERPRETATION:  AP chest on October 5, 2023 at 12:25 PM. Patient had NG   tube adjustment.    Right chest is excluded.    Heart likely enlarged.    Sternotomy, prosthetic heart valve, pacemaker, left atrial appendage   clipping, and large right jugular line remain.    There is a persistent mild left base infiltrate.    Present film shows NG tube now with tip in the stomach.    IMPRESSION: NG tube now has tip down in the stomach.    --- End of Report ---    < end of copied text >     CC: f/u for MRSA bacteremia  Date of service 10/5/2023  Patient reports: he is lethargic but a little more awake than yesterday. He received HD yesterday from a temp IJ catheter    REVIEW OF SYSTEMS:  All other review of systems negative (Comprehensive ROS): limited by condition, NG tube being placed    Antimicrobials Day #  :Vanco per levels-23-day 5    Other Medications Reviewed  MEDICATIONS  (STANDING):  aspirin  chewable 81 milliGRAM(s) Oral daily  epoetin val (PROCRIT) Injectable 00722 Unit(s) IV Push <User Schedule>  folic acid 1 milliGRAM(s) Oral daily  hydrocortisone hemorrhoidal Suppository 1 Suppository(s) Rectal two times a day  influenza  Vaccine (HIGH DOSE) 0.7 milliLiter(s) IntraMuscular once  levothyroxine 137 MICROGram(s) Oral daily  mesalamine Suppository 1000 milliGRAM(s) Rectal at bedtime  midodrine 10 milliGRAM(s) Oral every 8 hours  mupirocin 2% Nasal 1 Application(s) Both Nostrils two times a day  norepinephrine Infusion 0.05 MICROgram(s)/kG/Min (6.81 mL/Hr) IV Continuous <Continuous>  pantoprazole    Tablet 40 milliGRAM(s) Oral before breakfast  polyethylene glycol 3350 17 Gram(s) Oral daily  senna 2 Tablet(s) Oral at bedtime    T(F): 97.8 (10-05-23 @ 12:00), Max: 97.9 (10-04-23 @ 15:00)  HR: 82 (10-05-23 @ 12:30)  BP: 92/50 (10-05-23 @ 12:30)  RR: 18 (10-05-23 @ 12:30)  SpO2: 100% (10-05-23 @ 12:30)  Wt(kg): --    PHYSICAL EXAM:  General: lethargic no acute distress  Eyes:  anicteric, no conjunctival injection, no discharge  Oropharynx: no lesions or injection 	  Neck: supple, without adenopathy, IJ HD catheter  Lungs: clear to auscultation, decreased at bases  Heart: regular rate and rhythm; no murmur,  Abdomen: soft, nondistended, nontender, without mass or organomegaly  Skin: no rash  Extremities: no clubbing, cyanosis, ++ edema  Neurologic: lethargic, marginally interactive    LAB RESULTS:                        8.3    13.26 )-----------( 75       ( 05 Oct 2023 05:30 )             23.7     10-05    134<L>  |  100  |  66<H>  ----------------------------<  110<H>  3.7   |  25  |  4.43<H>    Ca    9.5      05 Oct 2023 05:30  Phos  2.5     10-05  Mg     1.9     10-05    TPro  5.9<L>  /  Alb  2.5<L>  /  TBili  0.7  /  DBili  x   /  AST  86<H>  /  ALT  79<H>  /  AlkPhos  397<H>  10-03    LIVER FUNCTIONS - ( 03 Oct 2023 15:51 )  Alb: 2.5 g/dL / Pro: 5.9 g/dL / ALK PHOS: 397 U/L / ALT: 79 U/L / AST: 86 U/L / GGT: x           Urinalysis Basic - ( 05 Oct 2023 05:30 )    Color: x / Appearance: x / SG: x / pH: x  Gluc: 110 mg/dL / Ketone: x  / Bili: x / Urobili: x   Blood: x / Protein: x / Nitrite: x   Leuk Esterase: x / RBC: x / WBC x   Sq Epi: x / Non Sq Epi: x / Bacteria: x      MICROBIOLOGY:  RECENT CULTURES:  10-04 @ 06:00 .Blood Blood     Growth in aerobic bottle: Gram Positive Cocci in Clusters  Growth in anaerobic bottle: Gram Positive Cocci in Clusters    Growth in aerobic bottle: Gram Positive Cocci in Clusters  Growth in anaerobic bottle: Gram Positive Cocci in Clusters    10-02 @ 16:21 .Catheter + MRSA Bacterimia Permacath Removal Aerobic/Anaerobic Right  IJ Permacath TIP     Greater than or equal to 15 Colonies Staphylococcus aureus      09-30 @ 19:15 .Blood Blood-Peripheral     Growth in aerobic and anaerobic bottles: Methicillin Resistant  Staphylococcus aureus  See previous culture  [id number]    Growth in anaerobic bottle: Gram Positive Cocci in Clusters  Growth in aerobic bottle: Gram Positive Cocci in Clusters    09-30 @ 19:10 .Blood Blood-Peripheral Blood Culture PCR  Methicillin resistant Staphylococcus aureus    Growth in aerobic and anaerobic bottles: Methicillin Resistant  Staphylococcus aureus  Direct identification is available within approximately 3-5  hours either by Blood Panel Multiplexed PCR or Direct  MALDI-TOF. Details: [email or web address]    Growth in anaerobic bottle: Gram Positive Cocci in Clusters  Growth in aerobic bottle: Gram Positive Cocci in Clusters        RADIOLOGY REVIEWED:  < from: Xray Chest 1 View AP/PA (10.05.23 @ 12:20) >  INTERPRETATION:  AP chest on October 5, 2023 at 12:25 PM. Patient had NG   tube adjustment.    Right chest is excluded.    Heart likely enlarged.    Sternotomy, prosthetic heart valve, pacemaker, left atrial appendage   clipping, and large right jugular line remain.    There is a persistent mild left base infiltrate.    Present film shows NG tube now with tip in the stomach.    IMPRESSION: NG tube now has tip down in the stomach.    --- End of Report ---    < end of copied text >     CC: f/u for MRSA bacteremia  Date of service 10/5/2023  Patient reports: he is lethargic but a little more awake than yesterday. He received HD yesterday from a temp IJ catheter    REVIEW OF SYSTEMS:  All other review of systems negative (Comprehensive ROS): limited by condition, NG tube being placed    Antimicrobials Day #  :Vanco per levels-23-day 5    Other Medications Reviewed  MEDICATIONS  (STANDING):  aspirin  chewable 81 milliGRAM(s) Oral daily  epoetin val (PROCRIT) Injectable 69692 Unit(s) IV Push <User Schedule>  folic acid 1 milliGRAM(s) Oral daily  hydrocortisone hemorrhoidal Suppository 1 Suppository(s) Rectal two times a day  influenza  Vaccine (HIGH DOSE) 0.7 milliLiter(s) IntraMuscular once  levothyroxine 137 MICROGram(s) Oral daily  mesalamine Suppository 1000 milliGRAM(s) Rectal at bedtime  midodrine 10 milliGRAM(s) Oral every 8 hours  mupirocin 2% Nasal 1 Application(s) Both Nostrils two times a day  norepinephrine Infusion 0.05 MICROgram(s)/kG/Min (6.81 mL/Hr) IV Continuous <Continuous>  pantoprazole    Tablet 40 milliGRAM(s) Oral before breakfast  polyethylene glycol 3350 17 Gram(s) Oral daily  senna 2 Tablet(s) Oral at bedtime    T(F): 97.8 (10-05-23 @ 12:00), Max: 97.9 (10-04-23 @ 15:00)  HR: 82 (10-05-23 @ 12:30)  BP: 92/50 (10-05-23 @ 12:30)  RR: 18 (10-05-23 @ 12:30)  SpO2: 100% (10-05-23 @ 12:30)  Wt(kg): --    PHYSICAL EXAM:  General: lethargic no acute distress  Eyes:  anicteric, no conjunctival injection, no discharge  Oropharynx: no lesions or injection 	  Neck: supple, without adenopathy, IJ HD catheter  Lungs: clear to auscultation, decreased at bases  Heart: regular rate and rhythm; no murmur,  Abdomen: soft, nondistended, nontender, without mass or organomegaly  Skin: no rash  Extremities: no clubbing, cyanosis, ++ edema  Neurologic: lethargic, marginally interactive    LAB RESULTS:                        8.3    13.26 )-----------( 75       ( 05 Oct 2023 05:30 )             23.7     10-05    134<L>  |  100  |  66<H>  ----------------------------<  110<H>  3.7   |  25  |  4.43<H>    Ca    9.5      05 Oct 2023 05:30  Phos  2.5     10-05  Mg     1.9     10-05    TPro  5.9<L>  /  Alb  2.5<L>  /  TBili  0.7  /  DBili  x   /  AST  86<H>  /  ALT  79<H>  /  AlkPhos  397<H>  10-03    LIVER FUNCTIONS - ( 03 Oct 2023 15:51 )  Alb: 2.5 g/dL / Pro: 5.9 g/dL / ALK PHOS: 397 U/L / ALT: 79 U/L / AST: 86 U/L / GGT: x           Urinalysis Basic - ( 05 Oct 2023 05:30 )    Color: x / Appearance: x / SG: x / pH: x  Gluc: 110 mg/dL / Ketone: x  / Bili: x / Urobili: x   Blood: x / Protein: x / Nitrite: x   Leuk Esterase: x / RBC: x / WBC x   Sq Epi: x / Non Sq Epi: x / Bacteria: x      MICROBIOLOGY:  RECENT CULTURES:  10-04 @ 06:00 .Blood Blood     Growth in aerobic bottle: Gram Positive Cocci in Clusters  Growth in anaerobic bottle: Gram Positive Cocci in Clusters    Growth in aerobic bottle: Gram Positive Cocci in Clusters  Growth in anaerobic bottle: Gram Positive Cocci in Clusters    10-02 @ 16:21 .Catheter + MRSA Bacterimia Permacath Removal Aerobic/Anaerobic Right  IJ Permacath TIP     Greater than or equal to 15 Colonies Staphylococcus aureus      09-30 @ 19:15 .Blood Blood-Peripheral     Growth in aerobic and anaerobic bottles: Methicillin Resistant  Staphylococcus aureus  See previous culture  [id number]    Growth in anaerobic bottle: Gram Positive Cocci in Clusters  Growth in aerobic bottle: Gram Positive Cocci in Clusters    09-30 @ 19:10 .Blood Blood-Peripheral Blood Culture PCR  Methicillin resistant Staphylococcus aureus    Growth in aerobic and anaerobic bottles: Methicillin Resistant  Staphylococcus aureus  Direct identification is available within approximately 3-5  hours either by Blood Panel Multiplexed PCR or Direct  MALDI-TOF. Details: [email or web address]    Growth in anaerobic bottle: Gram Positive Cocci in Clusters  Growth in aerobic bottle: Gram Positive Cocci in Clusters        RADIOLOGY REVIEWED:  < from: Xray Chest 1 View AP/PA (10.05.23 @ 12:20) >  INTERPRETATION:  AP chest on October 5, 2023 at 12:25 PM. Patient had NG   tube adjustment.    Right chest is excluded.    Heart likely enlarged.    Sternotomy, prosthetic heart valve, pacemaker, left atrial appendage   clipping, and large right jugular line remain.    There is a persistent mild left base infiltrate.    Present film shows NG tube now with tip in the stomach.    IMPRESSION: NG tube now has tip down in the stomach.    --- End of Report ---    < end of copied text >

## 2023-10-05 NOTE — PROGRESS NOTE ADULT - ASSESSMENT
A/P 77 yo M pmhx ESRD came to ED complaining of fatigue from GG JOEL . Patient  also febrile to 102.2 in ED with complains of cough and wheezing.  Noted to be hypotensive to 80's systolic.  Admitted to ICU          Sepsis:   -9/30 Bcx +MRSA,10/2  tunneled catheter tip +MRSA- pt on Vanco   - ID following     - given bacteremia and  bioprosthetic  valve, concern for endocarditis, however pt unable to tolerate ERENDIRA at this time 2/2 encephalopathy, thrombocytopenia  - remains in shock requiring pressors   -  midodrine to help wean pressors    PULM:  - concern for aspiration given poor mental status  - Ng placed for meds and nutrition      ESRD   -  on HD via Rt shiley HD cath- infected tunneled HD catheter removed  - Nephrology following      Lethargy :  - continues, but may improve w/ antbx and HD  - repeat CT head was neg.       Palliative ;   as a follow up , case d/w ccu team this Am and chart reviewed.  Pt remains very drowsy but will open eyes and wake slightly w/ deep stimulation.  Spoke few words .   Knew name and that he was in " hospital"  knew girlfriends name.   I called Surekha - girlfriend and no answer, left VM asking for return call.    all care as per med team, will cont to follow pt clinical course.     Pt remains full code    A/P 79 yo M pmhx ESRD came to ED complaining of fatigue from GG JOEL . Patient  also febrile to 102.2 in ED with complains of cough and wheezing.  Noted to be hypotensive to 80's systolic.  Admitted to ICU          Sepsis:   -9/30 Bcx +MRSA,10/2  tunneled catheter tip +MRSA- pt on Vanco   - ID following     - given bacteremia and  bioprosthetic  valve, concern for endocarditis, however pt unable to tolerate ERENDIRA at this time 2/2 encephalopathy, thrombocytopenia  - remains in shock requiring pressors   -  midodrine to help wean pressors    PULM:  - concern for aspiration given poor mental status  - Ng placed for meds and nutrition      ESRD   -  on HD via Rt shiley HD cath- infected tunneled HD catheter removed  - Nephrology following      Lethargy :  - continues, but may improve w/ antbx and HD  - repeat CT head was neg.       Palliative ;   as a follow up , case d/w ccu team this Am and chart reviewed.  Pt remains very drowsy but will open eyes and wake slightly w/ deep stimulation.  Spoke few words .   Knew name and that he was in " hospital"  knew girlfriends name.   I called Surekha - girlfriend and no answer, left VM asking for return call.    all care as per med team, will cont to follow pt clinical course.     Pt remains full code

## 2023-10-05 NOTE — PROGRESS NOTE ADULT - PROBLEM SELECTOR PLAN 1
Patient with thrombocytopenia clinically secondary to sepsis/BM suppression. Anemia associated with ESRD/chronic disease contributing. Platelet count currently trending upward. Epogen being given, per nephrology. T/C PRBC for hemoglobin<7/related symptoms.

## 2023-10-05 NOTE — PROGRESS NOTE ADULT - ASSESSMENT
Patient is a 78y male with a past history of A Fib, ESRD, CAD, Mitral valve replacement, and LAAA clip placement  who presented to [facility] on 9/30 in evening with fever and hypotension.  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  He had a tunnelled catheter, he is encephalopathic, unclear how long he has been on dialysis.   High grade MRSA bacteremia. Limited history at this point. He has a tunnelled HDC and a prosthetic mitral valve.  He was treated for MSSA bacteremia in March of 2023, negative ERENDIRA for valvular lesions, PPM removed and leadless micra PPM placed.  He was hospitalized in August with a rectal bleed,, CT showed  "proctitis" and received 10 days of ertapenem for a clostridial bacteremia.  His permacath was removed on 10/2, culture growing MRSA  Agree with cardiology, he is a poor candidate for ERENDIRA at this point and goals of care will guide approach.  Hopefully catheter  removal  will act as a form of source control. His thrombocytopenia is slowly improving, now 70,000.  10/4 blood cultures with GPC in clusters.He received HD on 10/4  Suggest:  1 Vanco per levels, advise 750 after each HD session  2 Advise repeat blood cultures in AM  3 Case reviewed on 10/4 and 10/5 with cardiology, ERENDIRA unlikely to  as he is not an operative candidate  4Supportive care, if we are unable to control bacteremia will consider combination therapy.     Patient is a 78y male with a past history of A Fib, ESRD, CAD, Mitral valve replacement, and LAAA clip placement  who presented to [facility] on 9/30 in evening with fever and hypotension.  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  He had a tunnelled catheter, he is encephalopathic, unclear how long he has been on dialysis.   High grade MRSA bacteremia. Limited history at this point. He has a tunnelled HDC and a prosthetic mitral valve.  He was treated for MSSA bacteremia in March of 2023, negative ERENDIRA for valvular lesions, PPM removed and leadless micra PPM placed.  He was hospitalized in August with a rectal bleed,, CT showed  "proctitis" and received 10 days of ertapenem for a clostridial bacteremia.  His permacath was removed on 10/2, culture growing MRSA  Agree with cardiology, he is a poor candidate for ERENDIRA at this point and goals of care will guide approach.  Hopefully catheter  removal  will act as a form of source control. His thrombocytopenia is slowly improving, now 70,000.  10/4 blood cultures with GPC in clusters.He received HD on 10/4  Suggest:  1 Vanco per levels, advise 750 after each HD session  2 Advise repeat blood cultures in AM  3 Case reviewed on 10/4 and 10/5 with cardiology, ERENDIRA unlikely to  as he is not an operative candidate  4Supportive care, if we are unable to control bacteremia will consider combination therapy.     Patient is a 78y male with a past history of A Fib, ESRD, CAD, Mitral valve replacement, and LAAA clip placement  who presented to [facility] on 9/30 in evening with fever and hypotension.  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  He had a tunnelled catheter, he is encephalopathic, unclear how long he has been on dialysis.   High grade MRSA bacteremia. Limited history at this point. He has a tunnelled HDC and a prosthetic mitral valve.  He was treated for MSSA bacteremia in March of 2023, negative ERENDIRA for valvular lesions, PPM removed and leadless micra PPM placed.  He was hospitalized in August with a rectal bleed,, CT showed  "proctitis" and received 10 days of ertapenem for a clostridial bacteremia.  His permacath was removed on 10/2, culture growing MRSA  Agree with cardiology, he is a poor candidate for ERENDIRA at this point and goals of care will guide approach.  Hopefully catheter  removal  will act as a form of source control. His thrombocytopenia is slowly improving, now 70,000.  10/4 blood cultures with GPC in clusters.He received HD on 10/4  Suggest:  1 Vanco per levels, advise 750 after each HD session  2 Advise repeat blood cultures in AM  3 Case reviewed on 10/4 and 10/5 with cardiology, ERENDIRA unlikely to  as he is not an operative candidate  4Supportive care, if we are unable to control bacteremia will consider combination therapy.

## 2023-10-05 NOTE — PROGRESS NOTE ADULT - SUBJECTIVE AND OBJECTIVE BOX
F/U Note:    78y Male PMH of ESRD on HD via tunneled catheter admitted with septic shock 2/2 to PNA and/or line sepsis (tunnelled HD catheter he arrived with).  -patient transferred to ICU for AMs and continued hypotension, required initiation of vasopressors    Interval Hx:  -had NGT placed today for feeds/meds  -remains on pressors     Vital Signs Last 24 Hrs  T(C): 36.6 (05 Oct 2023 12:00), Max: 36.6 (04 Oct 2023 23:00)  T(F): 97.8 (05 Oct 2023 12:00), Max: 97.9 (04 Oct 2023 23:00)  HR: 86 (05 Oct 2023 18:30) (67 - 122)  BP: 106/53 (05 Oct 2023 18:30) (82/45 - 138/87)  BP(mean): 70 (05 Oct 2023 18:30) (57 - 104)  RR: 16 (05 Oct 2023 18:30) (10 - 21)  SpO2: 99% (05 Oct 2023 18:00) (91% - 100%)    Parameters below as of 05 Oct 2023 07:00  Patient On (Oxygen Delivery Method): nasal cannula  O2 Flow (L/min): 2                              8.3    13.26 )-----------( 75       ( 05 Oct 2023 05:30 )             23.7         10-05    134<L>  |  100  |  66<H>  ----------------------------<  110<H>  3.7   |  25  |  4.43<H>    Ca    9.5      05 Oct 2023 05:30  Phos  2.5     10-05  Mg     1.9     10-05            NEURO: no headaches, blurry vision, tremors, depression, anxity  CV: no chest pain, palpitations, murmurs, orthopnea  Resp: no shortness of breath, cough, wheeze, sputum production  GI: no stomach pain,nausea, vomitting, flatulence, hematemesis, hematochezia  PV: no swelling of extremities, no hair loss, no coolness to extremities  ENDO: no polydypsia, polyphagia, polyuria, weight loss, night sweats          NEURO: awake and alert  CV: (+) S1/S2, rrr, no mrg  RESP: CTA b/l  GI: soft, non tender

## 2023-10-05 NOTE — PROGRESS NOTE ADULT - SUBJECTIVE AND OBJECTIVE BOX
Progress: remains in ccu, very lethargic, wakes to deep stimulation , talks few words , very weak       Pain:    no               location:   Review of Systems: [ Unable to obtain due to poor mentation]    MEDICATIONS  (STANDING):  aspirin  chewable 81 milliGRAM(s) Oral daily  epoetin val (PROCRIT) Injectable 41434 Unit(s) IV Push <User Schedule>  folic acid 1 milliGRAM(s) Oral daily  hydrocortisone hemorrhoidal Suppository 1 Suppository(s) Rectal two times a day  influenza  Vaccine (HIGH DOSE) 0.7 milliLiter(s) IntraMuscular once  levothyroxine 137 MICROGram(s) Oral daily  mesalamine Suppository 1000 milliGRAM(s) Rectal at bedtime  midodrine 10 milliGRAM(s) Oral every 8 hours  mupirocin 2% Nasal 1 Application(s) Both Nostrils two times a day  norepinephrine Infusion 0.05 MICROgram(s)/kG/Min (6.81 mL/Hr) IV Continuous <Continuous>  pantoprazole    Tablet 40 milliGRAM(s) Oral before breakfast  polyethylene glycol 3350 17 Gram(s) Oral daily  senna 2 Tablet(s) Oral at bedtime    MEDICATIONS  (PRN):  acetaminophen     Tablet .. 650 milliGRAM(s) Oral every 6 hours PRN Mild Pain (1 - 3)  aluminum hydroxide/magnesium hydroxide/simethicone Suspension 10 milliLiter(s) Oral every 6 hours PRN dyspepsia      PHYSICAL EXAM:  Vital Signs Last 24 Hrs  T(C): 36.6 (05 Oct 2023 12:00), Max: 36.6 (04 Oct 2023 23:00)  T(F): 97.8 (05 Oct 2023 12:00), Max: 97.9 (04 Oct 2023 23:00)  HR: 86 (05 Oct 2023 15:00) (67 - 122)  BP: 95/55 (05 Oct 2023 15:00) (81/52 - 138/87)  BP(mean): 69 (05 Oct 2023 15:00) (57 - 105)  RR: 16 (05 Oct 2023 15:00) (10 - 21)  SpO2: 99% (05 Oct 2023 15:00) (91% - 100%)    Parameters below as of 05 Oct 2023 06:00  Patient On (Oxygen Delivery Method): nasal cannula  O2 Flow (L/min): 2    General: lethargic but wakes w/ stim ., oriented x 1-2       HEENT: n/c, a/t , dry mouth , Ng     Lungs: few coarse w/ upper rhonchi   CV: normal  -tachy  GI: abd soft, n/t     : normal  , incontinent   Musculoskeletal: weak, edema le's    Skin: pale, w/d st 1-2 sacrum     Neuro: lethargic, wakes oriented 1-2    Oral intake ability: unable presently   Diet: NPO,     LABS:                          8.3    13.26 )-----------( 75       ( 05 Oct 2023 05:30 )             23.7     10-05    134<L>  |  100  |  66<H>  ----------------------------<  110<H>  3.7   |  25  |  4.43<H>    Ca    9.5      05 Oct 2023 05:30  Phos  2.5     10-05  Mg     1.9     10-05    TPro  5.9<L>  /  Alb  2.5<L>  /  TBili  0.7  /  DBili  x   /  AST  86<H>  /  ALT  79<H>  /  AlkPhos  397<H>  10-03    Urinalysis Basic - ( 05 Oct 2023 05:30 )    Color: x / Appearance: x / SG: x / pH: x  Gluc: 110 mg/dL / Ketone: x  / Bili: x / Urobili: x   Blood: x / Protein: x / Nitrite: x   Leuk Esterase: x / RBC: x / WBC x   Sq Epi: x / Non Sq Epi: x / Bacteria: x        RADIOLOGY & ADDITIONAL STUDIES: < from: Xray Chest 1 View AP/PA (10.05.23 @ 12:20) >  ACC: [id number] EXAM:  XR CHEST AP OR PA 1V   ORDERED BY:  KENDY BANDA     PROCEDURE DATE:  10/05/2023          INTERPRETATION:  AP chest on October 5, 2023 at 12:25 PM. Patient had NG   tube adjustment.    Right chest is excluded.    Heart likely enlarged.    Sternotomy, prosthetic heart valve, pacemaker, left atrial appendage   clipping, and large right jugular line remain.    There is a persistent mild left base infiltrate.    Present film shows NG tube now with tip in the stomach.    IMPRESSION: NG tube now has tip down in the stomach.          ADVANCE DIRECTIVES:  no full code   Advanced Care Planning discussion total time spent:     Progress: remains in ccu, very lethargic, wakes to deep stimulation , talks few words , very weak       Pain:    no               location:   Review of Systems: [ Unable to obtain due to poor mentation]    MEDICATIONS  (STANDING):  aspirin  chewable 81 milliGRAM(s) Oral daily  epoetin val (PROCRIT) Injectable 72918 Unit(s) IV Push <User Schedule>  folic acid 1 milliGRAM(s) Oral daily  hydrocortisone hemorrhoidal Suppository 1 Suppository(s) Rectal two times a day  influenza  Vaccine (HIGH DOSE) 0.7 milliLiter(s) IntraMuscular once  levothyroxine 137 MICROGram(s) Oral daily  mesalamine Suppository 1000 milliGRAM(s) Rectal at bedtime  midodrine 10 milliGRAM(s) Oral every 8 hours  mupirocin 2% Nasal 1 Application(s) Both Nostrils two times a day  norepinephrine Infusion 0.05 MICROgram(s)/kG/Min (6.81 mL/Hr) IV Continuous <Continuous>  pantoprazole    Tablet 40 milliGRAM(s) Oral before breakfast  polyethylene glycol 3350 17 Gram(s) Oral daily  senna 2 Tablet(s) Oral at bedtime    MEDICATIONS  (PRN):  acetaminophen     Tablet .. 650 milliGRAM(s) Oral every 6 hours PRN Mild Pain (1 - 3)  aluminum hydroxide/magnesium hydroxide/simethicone Suspension 10 milliLiter(s) Oral every 6 hours PRN dyspepsia      PHYSICAL EXAM:  Vital Signs Last 24 Hrs  T(C): 36.6 (05 Oct 2023 12:00), Max: 36.6 (04 Oct 2023 23:00)  T(F): 97.8 (05 Oct 2023 12:00), Max: 97.9 (04 Oct 2023 23:00)  HR: 86 (05 Oct 2023 15:00) (67 - 122)  BP: 95/55 (05 Oct 2023 15:00) (81/52 - 138/87)  BP(mean): 69 (05 Oct 2023 15:00) (57 - 105)  RR: 16 (05 Oct 2023 15:00) (10 - 21)  SpO2: 99% (05 Oct 2023 15:00) (91% - 100%)    Parameters below as of 05 Oct 2023 06:00  Patient On (Oxygen Delivery Method): nasal cannula  O2 Flow (L/min): 2    General: lethargic but wakes w/ stim ., oriented x 1-2       HEENT: n/c, a/t , dry mouth , Ng     Lungs: few coarse w/ upper rhonchi   CV: normal  -tachy  GI: abd soft, n/t     : normal  , incontinent   Musculoskeletal: weak, edema le's    Skin: pale, w/d st 1-2 sacrum     Neuro: lethargic, wakes oriented 1-2    Oral intake ability: unable presently   Diet: NPO,     LABS:                          8.3    13.26 )-----------( 75       ( 05 Oct 2023 05:30 )             23.7     10-05    134<L>  |  100  |  66<H>  ----------------------------<  110<H>  3.7   |  25  |  4.43<H>    Ca    9.5      05 Oct 2023 05:30  Phos  2.5     10-05  Mg     1.9     10-05    TPro  5.9<L>  /  Alb  2.5<L>  /  TBili  0.7  /  DBili  x   /  AST  86<H>  /  ALT  79<H>  /  AlkPhos  397<H>  10-03    Urinalysis Basic - ( 05 Oct 2023 05:30 )    Color: x / Appearance: x / SG: x / pH: x  Gluc: 110 mg/dL / Ketone: x  / Bili: x / Urobili: x   Blood: x / Protein: x / Nitrite: x   Leuk Esterase: x / RBC: x / WBC x   Sq Epi: x / Non Sq Epi: x / Bacteria: x        RADIOLOGY & ADDITIONAL STUDIES: < from: Xray Chest 1 View AP/PA (10.05.23 @ 12:20) >  ACC: [id number] EXAM:  XR CHEST AP OR PA 1V   ORDERED BY:  KENDY BANDA     PROCEDURE DATE:  10/05/2023          INTERPRETATION:  AP chest on October 5, 2023 at 12:25 PM. Patient had NG   tube adjustment.    Right chest is excluded.    Heart likely enlarged.    Sternotomy, prosthetic heart valve, pacemaker, left atrial appendage   clipping, and large right jugular line remain.    There is a persistent mild left base infiltrate.    Present film shows NG tube now with tip in the stomach.    IMPRESSION: NG tube now has tip down in the stomach.          ADVANCE DIRECTIVES:  no full code   Advanced Care Planning discussion total time spent:     Progress: remains in ccu, very lethargic, wakes to deep stimulation , talks few words , very weak       Pain:    no               location:   Review of Systems: [ Unable to obtain due to poor mentation]    MEDICATIONS  (STANDING):  aspirin  chewable 81 milliGRAM(s) Oral daily  epoetin val (PROCRIT) Injectable 18026 Unit(s) IV Push <User Schedule>  folic acid 1 milliGRAM(s) Oral daily  hydrocortisone hemorrhoidal Suppository 1 Suppository(s) Rectal two times a day  influenza  Vaccine (HIGH DOSE) 0.7 milliLiter(s) IntraMuscular once  levothyroxine 137 MICROGram(s) Oral daily  mesalamine Suppository 1000 milliGRAM(s) Rectal at bedtime  midodrine 10 milliGRAM(s) Oral every 8 hours  mupirocin 2% Nasal 1 Application(s) Both Nostrils two times a day  norepinephrine Infusion 0.05 MICROgram(s)/kG/Min (6.81 mL/Hr) IV Continuous <Continuous>  pantoprazole    Tablet 40 milliGRAM(s) Oral before breakfast  polyethylene glycol 3350 17 Gram(s) Oral daily  senna 2 Tablet(s) Oral at bedtime    MEDICATIONS  (PRN):  acetaminophen     Tablet .. 650 milliGRAM(s) Oral every 6 hours PRN Mild Pain (1 - 3)  aluminum hydroxide/magnesium hydroxide/simethicone Suspension 10 milliLiter(s) Oral every 6 hours PRN dyspepsia      PHYSICAL EXAM:  Vital Signs Last 24 Hrs  T(C): 36.6 (05 Oct 2023 12:00), Max: 36.6 (04 Oct 2023 23:00)  T(F): 97.8 (05 Oct 2023 12:00), Max: 97.9 (04 Oct 2023 23:00)  HR: 86 (05 Oct 2023 15:00) (67 - 122)  BP: 95/55 (05 Oct 2023 15:00) (81/52 - 138/87)  BP(mean): 69 (05 Oct 2023 15:00) (57 - 105)  RR: 16 (05 Oct 2023 15:00) (10 - 21)  SpO2: 99% (05 Oct 2023 15:00) (91% - 100%)    Parameters below as of 05 Oct 2023 06:00  Patient On (Oxygen Delivery Method): nasal cannula  O2 Flow (L/min): 2    General: lethargic but wakes w/ stim ., oriented x 1-2       HEENT: n/c, a/t , dry mouth , Ng     Lungs: few coarse w/ upper rhonchi   CV: normal  -tachy  GI: abd soft, n/t     : normal  , incontinent   Musculoskeletal: weak, edema le's    Skin: pale, w/d st 1-2 sacrum     Neuro: lethargic, wakes oriented 1-2    Oral intake ability: unable presently   Diet: NPO,     LABS:                          8.3    13.26 )-----------( 75       ( 05 Oct 2023 05:30 )             23.7     10-05    134<L>  |  100  |  66<H>  ----------------------------<  110<H>  3.7   |  25  |  4.43<H>    Ca    9.5      05 Oct 2023 05:30  Phos  2.5     10-05  Mg     1.9     10-05    TPro  5.9<L>  /  Alb  2.5<L>  /  TBili  0.7  /  DBili  x   /  AST  86<H>  /  ALT  79<H>  /  AlkPhos  397<H>  10-03    Urinalysis Basic - ( 05 Oct 2023 05:30 )    Color: x / Appearance: x / SG: x / pH: x  Gluc: 110 mg/dL / Ketone: x  / Bili: x / Urobili: x   Blood: x / Protein: x / Nitrite: x   Leuk Esterase: x / RBC: x / WBC x   Sq Epi: x / Non Sq Epi: x / Bacteria: x        RADIOLOGY & ADDITIONAL STUDIES: < from: Xray Chest 1 View AP/PA (10.05.23 @ 12:20) >  ACC: [id number] EXAM:  XR CHEST AP OR PA 1V   ORDERED BY:  KENDY BANDA     PROCEDURE DATE:  10/05/2023          INTERPRETATION:  AP chest on October 5, 2023 at 12:25 PM. Patient had NG   tube adjustment.    Right chest is excluded.    Heart likely enlarged.    Sternotomy, prosthetic heart valve, pacemaker, left atrial appendage   clipping, and large right jugular line remain.    There is a persistent mild left base infiltrate.    Present film shows NG tube now with tip in the stomach.    IMPRESSION: NG tube now has tip down in the stomach.          ADVANCE DIRECTIVES:  no full code   Advanced Care Planning discussion total time spent:

## 2023-10-05 NOTE — PROGRESS NOTE ADULT - SUBJECTIVE AND OBJECTIVE BOX
[facility]  [address] [place] [address] AVE [facility] [address]  [facility] [address]  Phone: [phone]  Fax: [phone]  Primary Provider: Norberto Posadas  Pre-op Performing Provider: NORBERTO POSADAS      PREOPERATIVE EVALUATION:  Today's date: 9/1/2022    Otis Brumfield is a 69 year old male who presents for a preoperative evaluation.    Surgical Information:  Surgery/Procedure: Laser ablation on wart right foot  Surgery Location: [facility]  Surgeon: Dr. Chaves  Surgery Date: 9/19/22  Time of Surgery: 7:00am  Where patient plans to recover: At home with family  Fax number for surgical facility: Note does not need to be faxed, will be available electronically in Epic.    Type of Anesthesia Anticipated: to be determined    Preop general physical exam  Preoperative examination completed.  No contraindication to scheduled laser ablation of right foot wart identified.    Plantar warts  As above.  Laser ablation procedure scheduled.    Essential hypertension  Refill on lisinopril hydrochlorothiazide 10/12.5 daily.  Blood pressure 150/78 on recheck and will monitor outside of office for goal less than 130/80 ideally.  States had 5 cups of coffee this morning.  With a friend.  Will reassess at annual wellness visit in the next 3 months or so.  - lisinopril-hydrochlorothiazide (ZESTORETIC) 10-12.5 MG tablet  Dispense: 90 tablet; Refill: 1  - Basic metabolic panel    Cervical spinal cord compression (H)  History of degenerative cervical disc disease with spinal cord compression.  Had been seen by Dr. Rosette Hylton however symptomatic improvement with conservative treatments which continue.    Impaired fasting glucose  Impaired fasting glucose history.  A1c updated  - Hemoglobin A1c    Tubular adenoma of colon  Prior tubular adenoma of colon on colonoscopy August 8, 2019 told to repeat at 5-year interval.       Subjective     HPI related to upcoming procedure: Patient seen today for preoperative  examination.  Persistent right foot plantars warts identified.  Laser ablation procedure scheduled for .  Failure of prior treatments noted.  Underlying hypertension and continues lisinopril hydrochlorothiazide 10/12.5 daily and will need refill.  Blood pressures typically tend to be better and not so high however did have 5 cups of coffee this morning with a friend.  History of cervical degenerative disc disease with cervical spinal cord compression historically.  Has been seen by spine specialist however conservative treatments have allowed patient to avoid surgical intervention.  Impaired fasting glucose historically.  Tubular adenoma of colon as well on colonoscopy 2019 and told to repeat at 5-year interval.  Comprehensive review of systems as above otherwise all negative.     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   1 older bro - DM   Surgeries: inguinal hernia repair ? left side; right rotator cuff surgery  (Dr. Gabriele Abernathy)  Hospitalizations: none    Preop Questions 2022   1. Have you ever had a heart attack or stroke? No   2. Have you ever had surgery on your heart or blood vessels, such as a stent placement, a coronary artery bypass, or surgery on an artery in your head, neck, heart, or legs? No   3. Do you have chest pain with activity? No   4. Do you have a history of  heart failure? No   5. Do you currently have a cold, bronchitis or symptoms of other infection? No   6. Do you have a cough, shortness of breath, or wheezing? No   7. Do you or anyone in your family have previous history of blood clots? No   8. Do you or does anyone in your family  have a serious bleeding problem such as prolonged bleeding following surgeries or cuts? No   9. Have you ever had problems with anemia or been told to take iron pills? No   10. Have you had any abnormal blood loss such as black, tarry or bloody stools? No   11. Have you ever had a blood transfusion? No   12. Are you willing to have a blood transfusion if it is medically needed before, during, or after your surgery? Yes   13. Have you or any of your relatives ever had problems with anesthesia? No   14. Do you have sleep apnea, excessive snoring or daytime drowsiness? No   15. Do you have any artifical heart valves or other implanted medical devices like a pacemaker, defibrillator, or continuous glucose monitor? No   16. Do you have artificial joints? No   17. Are you allergic to latex? No       Health Care Directive:  Patient does not have a Health Care Directive or Living Will: Discussed advance care planning with patient; information given to patient to review.    Preoperative Review of :   reviewed - no record of controlled substances prescribed.      Status of Chronic Conditions:  See problem list for active medical problems.  Problems all longstanding and stable, except as noted/documented.  See ROS for pertinent symptoms related to these conditions.      Review of Systems  CONSTITUTIONAL: NEGATIVE for fever, chills, change in weight  INTEGUMENTARY/SKIN: NEGATIVE for worrisome rashes, moles or lesions  EYES: NEGATIVE for vision changes or irritation  ENT/MOUTH: NEGATIVE for ear, mouth and throat problems  RESP: NEGATIVE for significant cough or SOB  CV: NEGATIVE for chest pain, palpitations or peripheral edema  GI: NEGATIVE for nausea, abdominal pain, heartburn, or change in bowel habits  : NEGATIVE for frequency, dysuria, or hematuria  MUSCULOSKELETAL: NEGATIVE for significant arthralgias or myalgia  NEURO: NEGATIVE for weakness, dizziness or paresthesias  ENDOCRINE: NEGATIVE for temperature  intolerance, skin/hair changes  HEME: NEGATIVE for bleeding problems  PSYCHIATRIC: NEGATIVE for changes in mood or affect    Patient Active Problem List    Diagnosis Date Noted     Cervical spinal cord compression (H) 09/01/2022     Priority: Medium     Plantar warts 08/10/2022     Priority: Medium     Added automatically from request for surgery [id number]       Hypertensive heart disease without heart failure 08/20/2019     Priority: Medium     Pneumonia 06/25/2018     Priority: Medium     Diverticulosis      Priority: Medium     Created by Conversion  Replacement Utility updated for latest IMO load      Formatting of this note might be different from the original.  Created by Conversion    Replacement Utility updated for latest IMO load       Hypertension      Priority: Medium     Created by Conversion  Replacement Utility updated for latest IMO load      Formatting of this note might be different from the original.  Created by Conversion    Replacement Utility updated for latest IMO load       Acrochordon      Priority: Medium     Created by Conversion  Replacement Utility updated for latest IMO load      Formatting of this note might be different from the original.  Created by Conversion    Replacement Utility updated for latest IMO load       Joint Pain, Localized In The Right Shoulder      Priority: Medium     Created by Conversion      Formatting of this note might be different from the original.  Created by Conversion       Benign Polyps Of The Large Intestine      Priority: Medium     Created by Conversion      Formatting of this note might be different from the original.  Created by Conversion       Acute Meniscal Tear      Priority: Medium     Created by Conversion      Formatting of this note might be different from the original.  Created by Conversion        Past Medical History:   Diagnosis Date     Benign neoplasm of colon     Created by Conversion      Diverticulosis of large intestine     Created by  Conversion  Replacement Utility updated for latest IMO load     Hypertension      Hypertrophic and atrophic condition of skin     Created by Conversion  Replacement Utility updated for latest IMO load     Past Surgical History:   Procedure Laterality Date     HC REMOVAL OF TONSILS,<11 Y/O      Description: Tonsillectomy;  Recorded: 05/24/2007;     HERNIA REPAIR Left      REMOVAL OF SPERM DUCT(S)      Description: Surgery Of Male Genitalia Vasectomy;  Recorded: 05/24/2007;     Current Outpatient Medications   Medication Sig Dispense Refill     lisinopril-hydrochlorothiazide (ZESTORETIC) 10-12.5 MG tablet Take 1 tablet by mouth daily 90 tablet 1       No Known Allergies     Social History     Tobacco Use     Smoking status: Light Tobacco Smoker     Types: Cigars     Smokeless tobacco: Never Used     Tobacco comment: cigar occassionally   Substance Use Topics     Alcohol use: Yes     Alcohol/week: 3.0 - 4.0 standard drinks     Family History   Problem Relation Age of Onset     Colon Cancer Father      Diabetes Mother      Diabetes Brother      History   Drug Use No         Objective     BP (!) 146/86 (BP Location: Left arm, Cuff Size: Adult Large)   Pulse 90   Temp 98.2  F (36.8  C) (Oral)   Wt 99.4 kg (219 lb 3.2 oz)   SpO2 98%   BMI 31.45 kg/m      Physical Exam    GENERAL APPEARANCE: healthy, alert and no distress     EYES: EOMI,  PERRL     HENT: ear canals and TM's normal and nose and mouth without ulcers or lesions     NECK: no adenopathy, no asymmetry, masses, or scars and thyroid normal to palpation     RESP: lungs clear to auscultation - no rales, rhonchi or wheezes     CV: regular rates and rhythm, normal S1 S2, no S3 or S4 and no murmur, click or rub     ABDOMEN:  soft, nontender, no HSM or masses and bowel sounds normal     MS: extremities with pes planus deformity, no evidence of inflammation in joints, FROM in all extremities.     SKIN: no suspicious lesions or rashes.  Persistent plantars wart  midfoot right foot with pes planus deformity.     NEURO: Normal strength and tone, sensory exam grossly normal, mentation intact and speech normal     PSYCH: mentation appears normal. and affect normal/bright     LYMPHATICS: No cervical adenopathy    Recent Labs   Lab Test 01/13/21  1352      POTASSIUM 4.2   CR 1.07   A1C 5.4        Diagnostics:  Labs pending at this time.  Results will be reviewed when available.  No results found for this or any previous visit (from the past 24 hour(s)).   No EKG required for low risk surgery (cataract, skin procedure, breast biopsy, etc).    Revised Cardiac Risk Index (RCRI):  The patient has the following serious cardiovascular risks for perioperative complications:   - No serious cardiac risks = 0 points     RCRI Interpretation: 0 points: Class I (very low risk - 0.4% complication rate)        EXAM: MR CERVICAL SPINE WO CONTRAST  LOCATION: [facility]  DATE/TIME: 1/29/2021 10:33 AM     INDICATION: chronic neck pain  COMPARISON: None.  TECHNIQUE: MRI Cervical Spine without IV contrast.     FINDINGS:   Retrolisthesis of C3 on C4 measuring 3 mm. Anterolisthesis of C6 on C7 measuring 1.3 mm. Mild degenerative endplate changes and anterior marginal osteophytes at C3/C4-C5/C6. The vertebral bodies of the cervical spine otherwise have normal stature,   alignment, and marrow signal intensity.     No evidence of signal abnormality or expansion within the cervical spinal cord.     C2/C3:  Mild loss of disc signal with normal disc height. No posterior disc bulge or spinal canal narrowing. No neural foraminal     C3/C4:  Mild loss of disc signal and disc height. Broad bar disc osteophyte complex with severe spinal canal narrowing (AP diameter the spinal canal measuring 5 mm) with compression of the cervical spinal cord without evidence of edema or myelomalacia.   Uncovertebral joint disease and facet arthropathy with severe bilateral neural foraminal    Significant recent/past 24 hr events:  Levophed requirements decreasing,     Subjective:    Review of Systems:   Unable to obtain due to altered mental status,, encephalopathy       HPI:   Patient is a 78y old  Male who presents with a chief complaint of Sepsis 2/2 bacteremia.  (05 Oct 2023 08:22)    PAST MEDICAL & SURGICAL HISTORY:  Chronic atrial fibrillation      History of BPH      CAD (coronary artery disease)      Coronary stent patent      ESRD on dialysis      History of GI bleed      Mitral valve replaced        FAMILY HISTORY:      Vitals   ICU Vital Signs Last 24 Hrs  T(C): 36.6 (05 Oct 2023 08:00), Max: 36.6 (04 Oct 2023 12:00)  T(F): 97.9 (05 Oct 2023 08:00), Max: 97.9 (04 Oct 2023 12:00)  HR: 101 (05 Oct 2023 10:30) (67 - 122)  BP: 108/68 (05 Oct 2023 10:30) (81/52 - 138/87)  BP(mean): 80 (05 Oct 2023 10:30) (57 - 105)  ABP: --  ABP(mean): --  RR: 16 (05 Oct 2023 10:30) (10 - 21)  SpO2: 99% (05 Oct 2023 10:30) (91% - 100%)    O2 Parameters below as of 05 Oct 2023 06:00  Patient On (Oxygen Delivery Method): nasal cannula  O2 Flow (L/min): 2          Physical Exam:   Constitutional: cacethic, weak, appears ill, confused and encephalopathic.    HEENT: normocephalic, atraumatic , conjunctiva normal B/L.  PERRLA, EOMI, no drainage or redness, no splenicterus neck supple,  No JVD, no thyromegaly, trachea is midline, oval cavity  pink with moist mucosa  Back: Normal spine flexure, No CVA tenderness, No deformity or limitation of movement  Respiratory: Breath Sounds equal & clear bilaterally to auscultation over all lung fields , no accessory muscle use noted, no wheezes, rales, rhonchi   Cardiovascular: irreg-irreg, rate controled @ , , normal S1, S2; no rubs, clicks, gallops murmurs,  no JVD, no peripheral edema  Abdomen:  Soft, non-tender, non distended, no hepatosplenomegaly, p+ bowel sounds in all 4 quadrants, , no masses felt  Extremities:  B/L  peripheral edema, no cyanosis, no clubbing   Vascular: Equal and normal pulses: 2+ peripheral pulses throughout  Musculoskeletal: No joint swelling or deformity; pt id bedbound with  limitation of movement at this time   Neurological:alert at times, not oriented to place, time or situation CN 2-12 grossly intact, follows basic commands  Skin: dry, thin ecchymotic, edematous 2/2 critical illness    VENT SETTINGS         I&O's Detail    04 Oct 2023 07:01  -  05 Oct 2023 07:00  --------------------------------------------------------  IN:    IV PiggyBack: 250 mL    Norepinephrine: 119 mL    Oral Fluid: 50 mL    Other (mL): 800 mL  Total IN: 1219 mL    OUT:    Other (mL): 3300 mL  Total OUT: 3300 mL    Total NET: -2081 mL      05 Oct 2023 07:01  -  05 Oct 2023 10:54  --------------------------------------------------------  IN:    Norepinephrine: 19 mL  Total IN: 19 mL    OUT:  Total OUT: 0 mL    Total NET: 19 mL          LABS                        8.3    13.26 )-----------( 75       ( 05 Oct 2023 05:30 )             23.7     10-05    134<L>  |  100  |  66<H>  ----------------------------<  110<H>  3.7   |  25  |  4.43<H>    Ca    9.5      05 Oct 2023 05:30  Phos  2.5     10-05  Mg     1.9     10-05    TPro  5.9<L>  /  Alb  2.5<L>  /  TBili  0.7  /  DBili  x   /  AST  86<H>  /  ALT  79<H>  /  AlkPhos  397<H>  10-03    LIVER FUNCTIONS - ( 03 Oct 2023 15:51 )  Alb: 2.5 g/dL / Pro: 5.9 g/dL / ALK PHOS: 397 U/L / ALT: 79 U/L / AST: 86 U/L / GGT: x                   Urinalysis Basic - ( 05 Oct 2023 05:30 )    Color: x / Appearance: x / SG: x / pH: x  Gluc: 110 mg/dL / Ketone: x  / Bili: x / Urobili: x   Blood: x / Protein: x / Nitrite: x   Leuk Esterase: x / RBC: x / WBC x   Sq Epi: x / Non Sq Epi: x / Bacteria: x            MEDICATIONS  (STANDING):  aspirin  chewable 81 milliGRAM(s) Oral daily  epoetin val (PROCRIT) Injectable 91889 Unit(s) IV Push <User Schedule>  folic acid 1 milliGRAM(s) Oral daily  hydrocortisone hemorrhoidal Suppository 1 Suppository(s) Rectal two times a day  influenza  Vaccine (HIGH DOSE) 0.7 milliLiter(s) IntraMuscular once  levothyroxine 137 MICROGram(s) Oral daily  mesalamine Suppository 1000 milliGRAM(s) Rectal at bedtime  midodrine 10 milliGRAM(s) Oral every 8 hours  mupirocin 2% Nasal 1 Application(s) Both Nostrils two times a day  norepinephrine Infusion 0.05 MICROgram(s)/kG/Min (6.81 mL/Hr) IV Continuous <Continuous>  pantoprazole    Tablet 40 milliGRAM(s) Oral before breakfast  polyethylene glycol 3350 17 Gram(s) Oral daily  senna 2 Tablet(s) Oral at bedtime    MEDICATIONS  (PRN):  acetaminophen     Tablet .. 650 milliGRAM(s) Oral every 6 hours PRN Mild Pain (1 - 3)  aluminum hydroxide/magnesium hydroxide/simethicone Suspension 10 milliLiter(s) Oral every 6 hours PRN dyspepsia      Allergies:  levofloxacin (Unknown)  alfuzosin (Unknown)  tamsulosin (Unknown)  Myrbetriq (Unknown)      Pt is 78 year old man with past medical history of CAD  (s/p CABG), Bioprosthetic MVR, ANAHI closure with AtriClip in 2021, Cardiomyopathy (EF 45-50% ) , Atrial fibrillation  on Apixiban, End stage renal disease via Rt chest wall permacath  ( Lt AVF unmatured)  with prior hospitalization in March at [facility] with pacemaker extraction secondary to MSSA bacteremia followed by replacement with Micra PPM. Recent  hospitalization at [facility] in August for rectal bleeding now presents with fever and cough, found to have septic shock, requiring pressor support, currently admitted to ICU. Blood cultures positive for GPCocci clusters with tunneled HD catheter removed and catheter tip culture + MRSA. Pt remains on IV Vanco and low dose Leophed,                  narrowing.     C4/C5:  Mild loss of disc signal and disc height. Broad bar disc osteophyte complex with mild spinal canal narrowing. Uncovertebral joint disease and facet arthropathy with severe left and mild right neural foraminal narrowing.     C5/C6:  Mild loss of disc signal and disc height. Broad bar disc osteophyte complex with moderate spinal canal narrowing and near complete effacement of the CSF surrounding the cervical spinal cord. Uncovertebral joint disease and facet arthropathy with      severe left and moderate right neural foraminal narrowing.       C6/C7:  Moderate loss of disc signal and disc height. Broad bar disc osteophyte complex with moderate spinal canal narrowing. Uncovertebral joint disease and facet arthropathy with severe bilateral neural foraminal narrowing.     C7/T1: Mild loss of disc signal and disc height. No posterior disc bulge or spinal canal narrowing. Mild bilateral neural foraminal narrowing.      IMPRESSION:  1.  Multilevel degenerative disc disease of the cervical spine with disc osteophyte complexes, most pronounced at the C3/C4 level where there is streak spinal canal narrowing (AP diameter the spinal canal measuring 5 mm) with compression of the cervical      spinal cord without evidence of edema or myelomalacia.  2.  Multilevel uncovertebral joint disease and facet arthropathy with severe multilevel neural foraminal narrowing at C3/C4-C6/C7 as described above.        EXAM: XR CERVICAL SPINE 4 - 5 VWS  LOCATION: [facility]  DATE/TIME: 2/9/2021 8:51 AM     INDICATION: Cervicalgia.  COMPARISON: Cervical spine MRI 01/29/2021.  TECHNIQUE: CR Cervical Spine.     IMPRESSION:  Cervical levels seen to C6-C7 on the lateral views. Levels below this are obscured by overlying shoulder artifact. 1 mm retrolisthesis C2 on C3 on the neutral view reduces on flexion and is stable on extension. 3 mm retrolisthesis C3 on C4   reduces to 2 mm on flexion and is    Significant recent/past 24 hr events:  Levophed requirements decreasing,     Subjective:    Review of Systems:   Unable to obtain due to altered mental status,, encephalopathy       HPI:   Patient is a 78y old  Male who presents with a chief complaint of Sepsis 2/2 bacteremia.  (05 Oct 2023 08:22)    PAST MEDICAL & SURGICAL HISTORY:  Chronic atrial fibrillation      History of BPH      CAD (coronary artery disease)      Coronary stent patent      ESRD on dialysis      History of GI bleed      Mitral valve replaced        FAMILY HISTORY:      Vitals   ICU Vital Signs Last 24 Hrs  T(C): 36.6 (05 Oct 2023 08:00), Max: 36.6 (04 Oct 2023 12:00)  T(F): 97.9 (05 Oct 2023 08:00), Max: 97.9 (04 Oct 2023 12:00)  HR: 101 (05 Oct 2023 10:30) (67 - 122)  BP: 108/68 (05 Oct 2023 10:30) (81/52 - 138/87)  BP(mean): 80 (05 Oct 2023 10:30) (57 - 105)  ABP: --  ABP(mean): --  RR: 16 (05 Oct 2023 10:30) (10 - 21)  SpO2: 99% (05 Oct 2023 10:30) (91% - 100%)    O2 Parameters below as of 05 Oct 2023 06:00  Patient On (Oxygen Delivery Method): nasal cannula  O2 Flow (L/min): 2          Physical Exam:   Constitutional: cacethic, weak, appears ill, confused and encephalopathic.    HEENT: normocephalic, atraumatic , conjunctiva normal B/L.  PERRLA, EOMI, no drainage or redness, no splenicterus neck supple,  No JVD, no thyromegaly, trachea is midline, oval cavity  pink with moist mucosa  Back: Normal spine flexure, No CVA tenderness, No deformity or limitation of movement  Respiratory: Breath Sounds equal & clear bilaterally to auscultation over all lung fields , no accessory muscle use noted, no wheezes, rales, rhonchi   Cardiovascular: irreg-irreg, rate controled @ , , normal S1, S2; no rubs, clicks, gallops murmurs,  no JVD, no peripheral edema  Abdomen:  Soft, non-tender, non distended, no hepatosplenomegaly, p+ bowel sounds in all 4 quadrants, , no masses felt  Extremities:  B/L  peripheral edema, no cyanosis, no clubbing   Vascular: Equal and normal pulses: 2+ peripheral pulses throughout  Musculoskeletal: No joint swelling or deformity; pt id bedbound with  limitation of movement at this time   Neurological:alert at times, not oriented to place, time or situation CN 2-12 grossly intact, follows basic commands  Skin: dry, thin ecchymotic, edematous 2/2 critical illness    VENT SETTINGS         I&O's Detail    04 Oct 2023 07:01  -  05 Oct 2023 07:00  --------------------------------------------------------  IN:    IV PiggyBack: 250 mL    Norepinephrine: 119 mL    Oral Fluid: 50 mL    Other (mL): 800 mL  Total IN: 1219 mL    OUT:    Other (mL): 3300 mL  Total OUT: 3300 mL    Total NET: -2081 mL      05 Oct 2023 07:01  -  05 Oct 2023 10:54  --------------------------------------------------------  IN:    Norepinephrine: 19 mL  Total IN: 19 mL    OUT:  Total OUT: 0 mL    Total NET: 19 mL          LABS                        8.3    13.26 )-----------( 75       ( 05 Oct 2023 05:30 )             23.7     10-05    134<L>  |  100  |  66<H>  ----------------------------<  110<H>  3.7   |  25  |  4.43<H>    Ca    9.5      05 Oct 2023 05:30  Phos  2.5     10-05  Mg     1.9     10-05    TPro  5.9<L>  /  Alb  2.5<L>  /  TBili  0.7  /  DBili  x   /  AST  86<H>  /  ALT  79<H>  /  AlkPhos  397<H>  10-03    LIVER FUNCTIONS - ( 03 Oct 2023 15:51 )  Alb: 2.5 g/dL / Pro: 5.9 g/dL / ALK PHOS: 397 U/L / ALT: 79 U/L / AST: 86 U/L / GGT: x                   Urinalysis Basic - ( 05 Oct 2023 05:30 )    Color: x / Appearance: x / SG: x / pH: x  Gluc: 110 mg/dL / Ketone: x  / Bili: x / Urobili: x   Blood: x / Protein: x / Nitrite: x   Leuk Esterase: x / RBC: x / WBC x   Sq Epi: x / Non Sq Epi: x / Bacteria: x            MEDICATIONS  (STANDING):  aspirin  chewable 81 milliGRAM(s) Oral daily  epoetin val (PROCRIT) Injectable 69761 Unit(s) IV Push <User Schedule>  folic acid 1 milliGRAM(s) Oral daily  hydrocortisone hemorrhoidal Suppository 1 Suppository(s) Rectal two times a day  influenza  Vaccine (HIGH DOSE) 0.7 milliLiter(s) IntraMuscular once  levothyroxine 137 MICROGram(s) Oral daily  mesalamine Suppository 1000 milliGRAM(s) Rectal at bedtime  midodrine 10 milliGRAM(s) Oral every 8 hours  mupirocin 2% Nasal 1 Application(s) Both Nostrils two times a day  norepinephrine Infusion 0.05 MICROgram(s)/kG/Min (6.81 mL/Hr) IV Continuous <Continuous>  pantoprazole    Tablet 40 milliGRAM(s) Oral before breakfast  polyethylene glycol 3350 17 Gram(s) Oral daily  senna 2 Tablet(s) Oral at bedtime    MEDICATIONS  (PRN):  acetaminophen     Tablet .. 650 milliGRAM(s) Oral every 6 hours PRN Mild Pain (1 - 3)  aluminum hydroxide/magnesium hydroxide/simethicone Suspension 10 milliLiter(s) Oral every 6 hours PRN dyspepsia      Allergies:  levofloxacin (Unknown)  alfuzosin (Unknown)  tamsulosin (Unknown)  Myrbetriq (Unknown)      Pt is 78 year old man with past medical history of CAD  (s/p CABG), Bioprosthetic MVR, ANAHI closure with AtriClip in 2021, Cardiomyopathy (EF 45-50% ) , Atrial fibrillation  on Apixiban, End stage renal disease via Rt chest wall permacath  ( Lt AVF unmatured)  with prior hospitalization in March at [facility] with pacemaker extraction secondary to MSSA bacteremia followed by replacement with Micra PPM. Recent  hospitalization at [facility] in August for rectal bleeding now presents with fever and cough, found to have septic shock, requiring pressor support, currently admitted to ICU. Blood cultures positive for GPCocci clusters with tunneled HD catheter removed and catheter tip culture + MRSA. Pt remains on IV Vanco and low dose Leophed,                  relatively stable on extension. 1.5 mm anterolisthesis C4 on C5 increases to 2 mm on flexion and reduces to 1 mm on extension. No other subluxation. Marked loss of disc space height C3-C4, C5-C6, and C6-C7. Multilevel   facet arthropathy. The odontoid process is grossly normal. Visualized lungs are clear.       Signed Electronically by: Terry Posadas MD  Copy of this evaluation report is provided to requesting physician.         Significant recent/past 24 hr events:  Levophed requirements decreasing,     Subjective:    Review of Systems:   Unable to obtain due to altered mental status,, encephalopathy       HPI:   Patient is a 78y old  Male who presents with a chief complaint of Sepsis 2/2 bacteremia.  (05 Oct 2023 08:22)    PAST MEDICAL & SURGICAL HISTORY:  Chronic atrial fibrillation      History of BPH      CAD (coronary artery disease)      Coronary stent patent      ESRD on dialysis      History of GI bleed      Mitral valve replaced        FAMILY HISTORY:      Vitals   ICU Vital Signs Last 24 Hrs  T(C): 36.6 (05 Oct 2023 08:00), Max: 36.6 (04 Oct 2023 12:00)  T(F): 97.9 (05 Oct 2023 08:00), Max: 97.9 (04 Oct 2023 12:00)  HR: 101 (05 Oct 2023 10:30) (67 - 122)  BP: 108/68 (05 Oct 2023 10:30) (81/52 - 138/87)  BP(mean): 80 (05 Oct 2023 10:30) (57 - 105)  ABP: --  ABP(mean): --  RR: 16 (05 Oct 2023 10:30) (10 - 21)  SpO2: 99% (05 Oct 2023 10:30) (91% - 100%)    O2 Parameters below as of 05 Oct 2023 06:00  Patient On (Oxygen Delivery Method): nasal cannula  O2 Flow (L/min): 2          Physical Exam:   Constitutional: cacethic, weak, appears ill, confused and encephalopathic.    HEENT: normocephalic, atraumatic , conjunctiva normal B/L.  PERRLA, EOMI, no drainage or redness, no splenicterus neck supple,  No JVD, no thyromegaly, trachea is midline, oval cavity  pink with moist mucosa  Back: Normal spine flexure, No CVA tenderness, No deformity or limitation of movement  Respiratory: Breath Sounds equal & clear bilaterally to auscultation over all lung fields , no accessory muscle use noted, no wheezes, rales, rhonchi   Cardiovascular: irreg-irreg, rate controled @ , , normal S1, S2; no rubs, clicks, gallops murmurs,  no JVD, no peripheral edema  Abdomen:  Soft, non-tender, non distended, no hepatosplenomegaly, p+ bowel sounds in all 4 quadrants, , no masses felt  Extremities:  B/L  peripheral edema, no cyanosis, no clubbing   Vascular: Equal and normal pulses: 2+ peripheral pulses throughout  Musculoskeletal: No joint swelling or deformity; pt id bedbound with  limitation of movement at this time   Neurological:alert at times, not oriented to place, time or situation CN 2-12 grossly intact, follows basic commands  Skin: dry, thin ecchymotic, edematous 2/2 critical illness    VENT SETTINGS         I&O's Detail    04 Oct 2023 07:01  -  05 Oct 2023 07:00  --------------------------------------------------------  IN:    IV PiggyBack: 250 mL    Norepinephrine: 119 mL    Oral Fluid: 50 mL    Other (mL): 800 mL  Total IN: 1219 mL    OUT:    Other (mL): 3300 mL  Total OUT: 3300 mL    Total NET: -2081 mL      05 Oct 2023 07:01  -  05 Oct 2023 10:54  --------------------------------------------------------  IN:    Norepinephrine: 19 mL  Total IN: 19 mL    OUT:  Total OUT: 0 mL    Total NET: 19 mL          LABS                        8.3    13.26 )-----------( 75       ( 05 Oct 2023 05:30 )             23.7     10-05    134<L>  |  100  |  66<H>  ----------------------------<  110<H>  3.7   |  25  |  4.43<H>    Ca    9.5      05 Oct 2023 05:30  Phos  2.5     10-05  Mg     1.9     10-05    TPro  5.9<L>  /  Alb  2.5<L>  /  TBili  0.7  /  DBili  x   /  AST  86<H>  /  ALT  79<H>  /  AlkPhos  397<H>  10-03    LIVER FUNCTIONS - ( 03 Oct 2023 15:51 )  Alb: 2.5 g/dL / Pro: 5.9 g/dL / ALK PHOS: 397 U/L / ALT: 79 U/L / AST: 86 U/L / GGT: x                   Urinalysis Basic - ( 05 Oct 2023 05:30 )    Color: x / Appearance: x / SG: x / pH: x  Gluc: 110 mg/dL / Ketone: x  / Bili: x / Urobili: x   Blood: x / Protein: x / Nitrite: x   Leuk Esterase: x / RBC: x / WBC x   Sq Epi: x / Non Sq Epi: x / Bacteria: x            MEDICATIONS  (STANDING):  aspirin  chewable 81 milliGRAM(s) Oral daily  epoetin val (PROCRIT) Injectable 95660 Unit(s) IV Push <User Schedule>  folic acid 1 milliGRAM(s) Oral daily  hydrocortisone hemorrhoidal Suppository 1 Suppository(s) Rectal two times a day  influenza  Vaccine (HIGH DOSE) 0.7 milliLiter(s) IntraMuscular once  levothyroxine 137 MICROGram(s) Oral daily  mesalamine Suppository 1000 milliGRAM(s) Rectal at bedtime  midodrine 10 milliGRAM(s) Oral every 8 hours  mupirocin 2% Nasal 1 Application(s) Both Nostrils two times a day  norepinephrine Infusion 0.05 MICROgram(s)/kG/Min (6.81 mL/Hr) IV Continuous <Continuous>  pantoprazole    Tablet 40 milliGRAM(s) Oral before breakfast  polyethylene glycol 3350 17 Gram(s) Oral daily  senna 2 Tablet(s) Oral at bedtime    MEDICATIONS  (PRN):  acetaminophen     Tablet .. 650 milliGRAM(s) Oral every 6 hours PRN Mild Pain (1 - 3)  aluminum hydroxide/magnesium hydroxide/simethicone Suspension 10 milliLiter(s) Oral every 6 hours PRN dyspepsia      Allergies:  levofloxacin (Unknown)  alfuzosin (Unknown)  tamsulosin (Unknown)  Myrbetriq (Unknown)      Pt is 78 year old man with past medical history of CAD  (s/p CABG), Bioprosthetic MVR, ANAHI closure with AtriClip in 2021, Cardiomyopathy (EF 45-50% ) , Atrial fibrillation  on Apixiban, End stage renal disease via Rt chest wall permacath  ( Lt AVF unmatured)  with prior hospitalization in March at [facility] with pacemaker extraction secondary to MSSA bacteremia followed by replacement with Micra PPM. Recent  hospitalization at [facility] in August for rectal bleeding now presents with fever and cough, found to have septic shock, requiring pressor support, currently admitted to ICU. Blood cultures positive for GPCocci clusters with tunneled HD catheter removed and catheter tip culture + MRSA. Pt remains on IV Vanco and low dose Leophed,                    Significant recent/past 24 hr events:  Levophed requirements decreasing,     Subjective:    Review of Systems:   Unable to obtain due to altered mental status,, encephalopathy       HPI:   Patient is a 78y old  Male who presents with a chief complaint of Sepsis 2/2 bacteremia.  (05 Oct 2023 08:22)    PAST MEDICAL & SURGICAL HISTORY:  Chronic atrial fibrillation      History of BPH      CAD (coronary artery disease)      Coronary stent patent      ESRD on dialysis      History of GI bleed      Mitral valve replaced        FAMILY HISTORY:      Vitals   ICU Vital Signs Last 24 Hrs  T(C): 36.6 (05 Oct 2023 08:00), Max: 36.6 (04 Oct 2023 12:00)  T(F): 97.9 (05 Oct 2023 08:00), Max: 97.9 (04 Oct 2023 12:00)  HR: 101 (05 Oct 2023 10:30) (67 - 122)  BP: 108/68 (05 Oct 2023 10:30) (81/52 - 138/87)  BP(mean): 80 (05 Oct 2023 10:30) (57 - 105)  ABP: --  ABP(mean): --  RR: 16 (05 Oct 2023 10:30) (10 - 21)  SpO2: 99% (05 Oct 2023 10:30) (91% - 100%)    O2 Parameters below as of 05 Oct 2023 06:00  Patient On (Oxygen Delivery Method): nasal cannula  O2 Flow (L/min): 2          Physical Exam:   Constitutional: cacethic, weak, appears ill, confused and encephalopathic.    HEENT: normocephalic, atraumatic , conjunctiva normal B/L.  PERRLA, EOMI, no drainage or redness, no splenicterus neck supple,  No JVD, no thyromegaly, trachea is midline, oval cavity  pink with moist mucosa  Back: Normal spine flexure, No CVA tenderness, No deformity or limitation of movement  Respiratory: Breath Sounds equal & clear bilaterally to auscultation over all lung fields , no accessory muscle use noted, no wheezes, rales, rhonchi   Cardiovascular: irreg-irreg, rate controled @ , , normal S1, S2; no rubs, clicks, gallops murmurs,  no JVD, no peripheral edema  Abdomen:  Soft, non-tender, non distended, no hepatosplenomegaly, p+ bowel sounds in all 4 quadrants, , no masses felt  Extremities:  B/L  peripheral edema, no cyanosis, no clubbing   Vascular: Equal and normal pulses: 2+ peripheral pulses throughout  Musculoskeletal: No joint swelling or deformity; pt id bedbound with  limitation of movement at this time   Neurological:alert at times, not oriented to place, time or situation CN 2-12 grossly intact, follows basic commands  Skin: dry, thin ecchymotic, edematous 2/2 critical illness    VENT SETTINGS         I&O's Detail    04 Oct 2023 07:01  -  05 Oct 2023 07:00  --------------------------------------------------------  IN:    IV PiggyBack: 250 mL    Norepinephrine: 119 mL    Oral Fluid: 50 mL    Other (mL): 800 mL  Total IN: 1219 mL    OUT:    Other (mL): 3300 mL  Total OUT: 3300 mL    Total NET: -2081 mL      05 Oct 2023 07:01  -  05 Oct 2023 10:54  --------------------------------------------------------  IN:    Norepinephrine: 19 mL  Total IN: 19 mL    OUT:  Total OUT: 0 mL    Total NET: 19 mL          LABS                        8.3    13.26 )-----------( 75       ( 05 Oct 2023 05:30 )             23.7     10-05    134<L>  |  100  |  66<H>  ----------------------------<  110<H>  3.7   |  25  |  4.43<H>    Ca    9.5      05 Oct 2023 05:30  Phos  2.5     10-05  Mg     1.9     10-05    TPro  5.9<L>  /  Alb  2.5<L>  /  TBili  0.7  /  DBili  x   /  AST  86<H>  /  ALT  79<H>  /  AlkPhos  397<H>  10-03    LIVER FUNCTIONS - ( 03 Oct 2023 15:51 )  Alb: 2.5 g/dL / Pro: 5.9 g/dL / ALK PHOS: 397 U/L / ALT: 79 U/L / AST: 86 U/L / GGT: x                   Urinalysis Basic - ( 05 Oct 2023 05:30 )    Color: x / Appearance: x / SG: x / pH: x  Gluc: 110 mg/dL / Ketone: x  / Bili: x / Urobili: x   Blood: x / Protein: x / Nitrite: x   Leuk Esterase: x / RBC: x / WBC x   Sq Epi: x / Non Sq Epi: x / Bacteria: x            MEDICATIONS  (STANDING):  aspirin  chewable 81 milliGRAM(s) Oral daily  epoetin val (PROCRIT) Injectable 50450 Unit(s) IV Push <User Schedule>  folic acid 1 milliGRAM(s) Oral daily  hydrocortisone hemorrhoidal Suppository 1 Suppository(s) Rectal two times a day  influenza  Vaccine (HIGH DOSE) 0.7 milliLiter(s) IntraMuscular once  levothyroxine 137 MICROGram(s) Oral daily  mesalamine Suppository 1000 milliGRAM(s) Rectal at bedtime  midodrine 10 milliGRAM(s) Oral every 8 hours  mupirocin 2% Nasal 1 Application(s) Both Nostrils two times a day  norepinephrine Infusion 0.05 MICROgram(s)/kG/Min (6.81 mL/Hr) IV Continuous <Continuous>  pantoprazole    Tablet 40 milliGRAM(s) Oral before breakfast  polyethylene glycol 3350 17 Gram(s) Oral daily  senna 2 Tablet(s) Oral at bedtime    MEDICATIONS  (PRN):  acetaminophen     Tablet .. 650 milliGRAM(s) Oral every 6 hours PRN Mild Pain (1 - 3)  aluminum hydroxide/magnesium hydroxide/simethicone Suspension 10 milliLiter(s) Oral every 6 hours PRN dyspepsia      Allergies:  levofloxacin (Unknown)  alfuzosin (Unknown)  tamsulosin (Unknown)  Myrbetriq (Unknown)      Pt is 78 year old man with past medical history of CAD  (s/p CABG), Bioprosthetic MVR, ANAHI closure with AtriClip in 2021, Cardiomyopathy (EF 45-50% ) , Atrial fibrillation  on Apixiban, End stage renal disease via Rt chest wall permacath  ( Lt AVF unmatured)  with prior hospitalization in March at [facility] with pacemaker extraction secondary to MSSA bacteremia followed by replacement with Micra PPM. Recent  hospitalization at [facility] in August for rectal bleeding now presents with fever and cough, found to have septic shock, requiring pressor support, currently admitted to ICU. Blood cultures 9/30  positive for MRSA,  Pt remains on IV Vanco by trough and low dose Levophed,     #ID:  -9/30 Bcx +MRSA,10/2  tunneled catheter tip +MRSA- pt on Vanco dosed daily by trough.   - Bcx drawn 10/4 remain + for GPCocci clusters  - appreciate ID recommendations   -c/w Mucpirocin nasal bid  - given bacetremia and  biopros valve, concern for endocarditis, however pt unable to tolerate ERENDIRA at this time 2/2 encephalopathy,     #RENAL:  - h/o ESRD on HD via Rt shiley HD cath,  Lt AVF intact, however not matured for use. Bedside HD scheduled for Fri 10/6   - K 3.7, Phos 2.5,   - infected tunneled HD catheter removed  -Nephrology recommendations appreciated.                  Significant recent/past 24 hr events:  Levophed requirements decreasing,     Subjective:    Review of Systems:   Unable to obtain due to altered mental status,, encephalopathy       HPI:   Patient is a 78y old  Male who presents with a chief complaint of Sepsis 2/2 bacteremia.  (05 Oct 2023 08:22)    PAST MEDICAL & SURGICAL HISTORY:  Chronic atrial fibrillation      History of BPH      CAD (coronary artery disease)      Coronary stent patent      ESRD on dialysis      History of GI bleed      Mitral valve replaced        FAMILY HISTORY:      Vitals   ICU Vital Signs Last 24 Hrs  T(C): 36.6 (05 Oct 2023 08:00), Max: 36.6 (04 Oct 2023 12:00)  T(F): 97.9 (05 Oct 2023 08:00), Max: 97.9 (04 Oct 2023 12:00)  HR: 101 (05 Oct 2023 10:30) (67 - 122)  BP: 108/68 (05 Oct 2023 10:30) (81/52 - 138/87)  BP(mean): 80 (05 Oct 2023 10:30) (57 - 105)  ABP: --  ABP(mean): --  RR: 16 (05 Oct 2023 10:30) (10 - 21)  SpO2: 99% (05 Oct 2023 10:30) (91% - 100%)    O2 Parameters below as of 05 Oct 2023 06:00  Patient On (Oxygen Delivery Method): nasal cannula  O2 Flow (L/min): 2          Physical Exam:   Constitutional: cacethic, weak, appears ill, confused and encephalopathic.    HEENT: normocephalic, atraumatic , conjunctiva normal B/L.  PERRLA, EOMI, no drainage or redness, no splenicterus neck supple,  No JVD, no thyromegaly, trachea is midline, oval cavity  pink with moist mucosa  Back: Normal spine flexure, No CVA tenderness, No deformity or limitation of movement  Respiratory: Breath Sounds equal & clear bilaterally to auscultation over all lung fields , no accessory muscle use noted, no wheezes, rales, rhonchi   Cardiovascular: irreg-irreg, rate controled @ , , normal S1, S2; no rubs, clicks, gallops murmurs,  no JVD, no peripheral edema  Abdomen:  Soft, non-tender, non distended, no hepatosplenomegaly, p+ bowel sounds in all 4 quadrants, , no masses felt  Extremities:  B/L  peripheral edema, no cyanosis, no clubbing   Vascular: Equal and normal pulses: 2+ peripheral pulses throughout  Musculoskeletal: No joint swelling or deformity; pt id bedbound with  limitation of movement at this time   Neurological:alert at times, not oriented to place, time or situation CN 2-12 grossly intact, follows basic commands  Skin: dry, thin ecchymotic, edematous 2/2 critical illness    VENT SETTINGS         I&O's Detail    04 Oct 2023 07:01  -  05 Oct 2023 07:00  --------------------------------------------------------  IN:    IV PiggyBack: 250 mL    Norepinephrine: 119 mL    Oral Fluid: 50 mL    Other (mL): 800 mL  Total IN: 1219 mL    OUT:    Other (mL): 3300 mL  Total OUT: 3300 mL    Total NET: -2081 mL      05 Oct 2023 07:01  -  05 Oct 2023 10:54  --------------------------------------------------------  IN:    Norepinephrine: 19 mL  Total IN: 19 mL    OUT:  Total OUT: 0 mL    Total NET: 19 mL          LABS                        8.3    13.26 )-----------( 75       ( 05 Oct 2023 05:30 )             23.7     10-05    134<L>  |  100  |  66<H>  ----------------------------<  110<H>  3.7   |  25  |  4.43<H>    Ca    9.5      05 Oct 2023 05:30  Phos  2.5     10-05  Mg     1.9     10-05    TPro  5.9<L>  /  Alb  2.5<L>  /  TBili  0.7  /  DBili  x   /  AST  86<H>  /  ALT  79<H>  /  AlkPhos  397<H>  10-03    LIVER FUNCTIONS - ( 03 Oct 2023 15:51 )  Alb: 2.5 g/dL / Pro: 5.9 g/dL / ALK PHOS: 397 U/L / ALT: 79 U/L / AST: 86 U/L / GGT: x                   Urinalysis Basic - ( 05 Oct 2023 05:30 )    Color: x / Appearance: x / SG: x / pH: x  Gluc: 110 mg/dL / Ketone: x  / Bili: x / Urobili: x   Blood: x / Protein: x / Nitrite: x   Leuk Esterase: x / RBC: x / WBC x   Sq Epi: x / Non Sq Epi: x / Bacteria: x            MEDICATIONS  (STANDING):  aspirin  chewable 81 milliGRAM(s) Oral daily  epoetin val (PROCRIT) Injectable 17628 Unit(s) IV Push <User Schedule>  folic acid 1 milliGRAM(s) Oral daily  hydrocortisone hemorrhoidal Suppository 1 Suppository(s) Rectal two times a day  influenza  Vaccine (HIGH DOSE) 0.7 milliLiter(s) IntraMuscular once  levothyroxine 137 MICROGram(s) Oral daily  mesalamine Suppository 1000 milliGRAM(s) Rectal at bedtime  midodrine 10 milliGRAM(s) Oral every 8 hours  mupirocin 2% Nasal 1 Application(s) Both Nostrils two times a day  norepinephrine Infusion 0.05 MICROgram(s)/kG/Min (6.81 mL/Hr) IV Continuous <Continuous>  pantoprazole    Tablet 40 milliGRAM(s) Oral before breakfast  polyethylene glycol 3350 17 Gram(s) Oral daily  senna 2 Tablet(s) Oral at bedtime    MEDICATIONS  (PRN):  acetaminophen     Tablet .. 650 milliGRAM(s) Oral every 6 hours PRN Mild Pain (1 - 3)  aluminum hydroxide/magnesium hydroxide/simethicone Suspension 10 milliLiter(s) Oral every 6 hours PRN dyspepsia      Allergies:  levofloxacin (Unknown)  alfuzosin (Unknown)  tamsulosin (Unknown)  Myrbetriq (Unknown)      Pt is 78 year old man with past medical history of CAD  (s/p CABG), Bioprosthetic MVR, ANAHI closure with AtriClip in 2021, Cardiomyopathy (EF 45-50% ) , Atrial fibrillation  on Apixiban, End stage renal disease via Rt chest wall permacath  ( Lt AVF unmatured)  with prior hospitalization in March at [facility] with pacemaker extraction secondary to MSSA bacteremia followed by replacement with Micra PPM. Recent  hospitalization at [facility] in August for rectal bleeding now presents with fever and cough, found to have septic shock, requiring pressor support, currently admitted to ICU. Blood cultures 9/30  positive for MRSA,  Pt remains on IV Vanco by trough and low dose Levophed,     #ID:  -9/30 Bcx +MRSA,10/2  tunneled catheter tip +MRSA- pt on Vanco dosed daily by trough.   - Bcx drawn 10/4 remain + for GPCocci clusters  - appreciate ID recommendations   -c/w Mucpirocin nasal bid  - given bacetremia and  biopros valve, concern for endocarditis, however pt unable to tolerate ERENDIRA at this time 2/2 encephalopathy,     #RENAL:  - h/o ESRD on HD via Rt shiley HD cath,  Lt AVF intact, however not matured for use. Bedside HD scheduled for Fri 10/6   - K 3.7, Phos 2.5,   - infected tunneled HD catheter removed  -Nephrology recommendations appreciated.                  Significant recent/past 24 hr events:  Levophed requirements decreasing,     Subjective:    Review of Systems:   Unable to obtain due to altered mental status,, encephalopathy       HPI:   Patient is a 78y old  Male who presents with a chief complaint of Sepsis 2/2 bacteremia.  (05 Oct 2023 08:22)    PAST MEDICAL & SURGICAL HISTORY:  Chronic atrial fibrillation      History of BPH      CAD (coronary artery disease)      Coronary stent patent      ESRD on dialysis      History of GI bleed      Mitral valve replaced        FAMILY HISTORY:      Vitals   ICU Vital Signs Last 24 Hrs  T(C): 36.6 (05 Oct 2023 08:00), Max: 36.6 (04 Oct 2023 12:00)  T(F): 97.9 (05 Oct 2023 08:00), Max: 97.9 (04 Oct 2023 12:00)  HR: 101 (05 Oct 2023 10:30) (67 - 122)  BP: 108/68 (05 Oct 2023 10:30) (81/52 - 138/87)  BP(mean): 80 (05 Oct 2023 10:30) (57 - 105)  ABP: --  ABP(mean): --  RR: 16 (05 Oct 2023 10:30) (10 - 21)  SpO2: 99% (05 Oct 2023 10:30) (91% - 100%)    O2 Parameters below as of 05 Oct 2023 06:00  Patient On (Oxygen Delivery Method): nasal cannula  O2 Flow (L/min): 2          Physical Exam:   Constitutional: cacethic, weak, appears ill, confused and encephalopathic.    HEENT: normocephalic, atraumatic , conjunctiva normal B/L.  PERRLA, EOMI, no drainage or redness, no splenicterus neck supple,  No JVD, no thyromegaly, trachea is midline, oval cavity  pink with moist mucosa  Back: Normal spine flexure, No CVA tenderness, No deformity or limitation of movement  Respiratory: Breath Sounds equal & clear bilaterally to auscultation over all lung fields , no accessory muscle use noted, no wheezes, rales, rhonchi   Cardiovascular: irreg-irreg, rate controled @ , , normal S1, S2; no rubs, clicks, gallops murmurs,  no JVD, no peripheral edema  Abdomen:  Soft, non-tender, non distended, no hepatosplenomegaly, p+ bowel sounds in all 4 quadrants, , no masses felt  Extremities:  B/L  peripheral edema, no cyanosis, no clubbing   Vascular: Equal and normal pulses: 2+ peripheral pulses throughout  Musculoskeletal: No joint swelling or deformity; pt id bedbound with  limitation of movement at this time   Neurological:alert at times, not oriented to place, time or situation CN 2-12 grossly intact, follows basic commands  Skin: dry, thin ecchymotic, edematous 2/2 critical illness    VENT SETTINGS         I&O's Detail    04 Oct 2023 07:01  -  05 Oct 2023 07:00  --------------------------------------------------------  IN:    IV PiggyBack: 250 mL    Norepinephrine: 119 mL    Oral Fluid: 50 mL    Other (mL): 800 mL  Total IN: 1219 mL    OUT:    Other (mL): 3300 mL  Total OUT: 3300 mL    Total NET: -2081 mL      05 Oct 2023 07:01  -  05 Oct 2023 10:54  --------------------------------------------------------  IN:    Norepinephrine: 19 mL  Total IN: 19 mL    OUT:  Total OUT: 0 mL    Total NET: 19 mL          LABS                        8.3    13.26 )-----------( 75       ( 05 Oct 2023 05:30 )             23.7     10-05    134<L>  |  100  |  66<H>  ----------------------------<  110<H>  3.7   |  25  |  4.43<H>    Ca    9.5      05 Oct 2023 05:30  Phos  2.5     10-05  Mg     1.9     10-05    TPro  5.9<L>  /  Alb  2.5<L>  /  TBili  0.7  /  DBili  x   /  AST  86<H>  /  ALT  79<H>  /  AlkPhos  397<H>  10-03    LIVER FUNCTIONS - ( 03 Oct 2023 15:51 )  Alb: 2.5 g/dL / Pro: 5.9 g/dL / ALK PHOS: 397 U/L / ALT: 79 U/L / AST: 86 U/L / GGT: x                   Urinalysis Basic - ( 05 Oct 2023 05:30 )    Color: x / Appearance: x / SG: x / pH: x  Gluc: 110 mg/dL / Ketone: x  / Bili: x / Urobili: x   Blood: x / Protein: x / Nitrite: x   Leuk Esterase: x / RBC: x / WBC x   Sq Epi: x / Non Sq Epi: x / Bacteria: x            MEDICATIONS  (STANDING):  aspirin  chewable 81 milliGRAM(s) Oral daily  epoetin val (PROCRIT) Injectable 30202 Unit(s) IV Push <User Schedule>  folic acid 1 milliGRAM(s) Oral daily  hydrocortisone hemorrhoidal Suppository 1 Suppository(s) Rectal two times a day  influenza  Vaccine (HIGH DOSE) 0.7 milliLiter(s) IntraMuscular once  levothyroxine 137 MICROGram(s) Oral daily  mesalamine Suppository 1000 milliGRAM(s) Rectal at bedtime  midodrine 10 milliGRAM(s) Oral every 8 hours  mupirocin 2% Nasal 1 Application(s) Both Nostrils two times a day  norepinephrine Infusion 0.05 MICROgram(s)/kG/Min (6.81 mL/Hr) IV Continuous <Continuous>  pantoprazole    Tablet 40 milliGRAM(s) Oral before breakfast  polyethylene glycol 3350 17 Gram(s) Oral daily  senna 2 Tablet(s) Oral at bedtime    MEDICATIONS  (PRN):  acetaminophen     Tablet .. 650 milliGRAM(s) Oral every 6 hours PRN Mild Pain (1 - 3)  aluminum hydroxide/magnesium hydroxide/simethicone Suspension 10 milliLiter(s) Oral every 6 hours PRN dyspepsia      Allergies:  levofloxacin (Unknown)  alfuzosin (Unknown)  tamsulosin (Unknown)  Myrbetriq (Unknown)      Pt is 78 year old man with past medical history of CAD  (s/p CABG), Bioprosthetic MVR, ANAHI closure with AtriClip in 2021, Cardiomyopathy (EF 45-50% ) , Atrial fibrillation  on Apixiban, End stage renal disease via Rt chest wall permacath  ( Lt AVF unmatured)  with prior hospitalization in March at [facility] with pacemaker extraction secondary to MSSA bacteremia followed by replacement with Micra PPM. Recent  hospitalization at [facility] in August for rectal bleeding now presents with fever and cough, found to have septic shock, requiring pressor support, currently admitted to ICU. Blood cultures 9/30  positive for MRSA,  Pt remains on IV Vanco by trough and low dose Levophed,     #ID:  -9/30 Bcx +MRSA,10/2  tunneled catheter tip +MRSA- pt on Vanco dosed daily by trough.   - Bcx drawn 10/4 remain + for GPCocci clusters  - appreciate ID recommendations   -c/w Mucpirocin nasal bid  - given bacetremia and  biopros valve, concern for endocarditis, however pt unable to tolerate ERENDIRA at this time 2/2 encephalopathy,     #RENAL:  - h/o ESRD on HD via Rt shiley HD cath,  Lt AVF intact, however not matured for use. Bedside HD scheduled for Fri 10/6   - K 3.7, Phos 2.5,   - infected tunneled HD catheter removed  -Nephrology recommendations appreciated.                  Significant recent/past 24 hr events:  Levophed requirements decreasing,     Subjective:    Review of Systems:   Unable to obtain due to altered mental status,, encephalopathy       HPI:   Patient is a 78y old  Male who presents with a chief complaint of Sepsis 2/2 bacteremia.  (05 Oct 2023 08:22)    PAST MEDICAL & SURGICAL HISTORY:  Chronic atrial fibrillation      History of BPH      CAD (coronary artery disease)      Coronary stent patent      ESRD on dialysis      History of GI bleed      Mitral valve replaced        FAMILY HISTORY:      Vitals   ICU Vital Signs Last 24 Hrs  T(C): 36.6 (05 Oct 2023 08:00), Max: 36.6 (04 Oct 2023 12:00)  T(F): 97.9 (05 Oct 2023 08:00), Max: 97.9 (04 Oct 2023 12:00)  HR: 101 (05 Oct 2023 10:30) (67 - 122)  BP: 108/68 (05 Oct 2023 10:30) (81/52 - 138/87)  BP(mean): 80 (05 Oct 2023 10:30) (57 - 105)  ABP: --  ABP(mean): --  RR: 16 (05 Oct 2023 10:30) (10 - 21)  SpO2: 99% (05 Oct 2023 10:30) (91% - 100%)    O2 Parameters below as of 05 Oct 2023 06:00  Patient On (Oxygen Delivery Method): nasal cannula  O2 Flow (L/min): 2          Physical Exam:   Constitutional: cacethic, weak, appears ill, confused and encephalopathic.    HEENT: normocephalic, atraumatic , conjunctiva normal B/L.  PERRLA, EOMI, no drainage or redness, no splenicterus neck supple,  No JVD, no thyromegaly, trachea is midline, oval cavity  pink with moist mucosa  Back: Normal spine flexure, No CVA tenderness, No deformity or limitation of movement  Respiratory: Breath Sounds equal & clear bilaterally to auscultation over all lung fields , no accessory muscle use noted, no wheezes, rales, rhonchi   Cardiovascular: irreg-irreg, rate controled @ , , normal S1, S2; no rubs, clicks, gallops murmurs,  no JVD, no peripheral edema  Abdomen:  Soft, non-tender, non distended, no hepatosplenomegaly, p+ bowel sounds in all 4 quadrants, , no masses felt  Extremities:  B/L  peripheral edema, no cyanosis, no clubbing   Vascular: Equal and normal pulses: 2+ peripheral pulses throughout  Musculoskeletal: No joint swelling or deformity; pt id bedbound with  limitation of movement at this time   Neurological:alert at times, not oriented to place, time or situation CN 2-12 grossly intact, follows basic commands  Skin: dry, thin ecchymotic, edematous 2/2 critical illness    VENT SETTINGS         I&O's Detail    04 Oct 2023 07:01  -  05 Oct 2023 07:00  --------------------------------------------------------  IN:    IV PiggyBack: 250 mL    Norepinephrine: 119 mL    Oral Fluid: 50 mL    Other (mL): 800 mL  Total IN: 1219 mL    OUT:    Other (mL): 3300 mL  Total OUT: 3300 mL    Total NET: -2081 mL      05 Oct 2023 07:01  -  05 Oct 2023 10:54  --------------------------------------------------------  IN:    Norepinephrine: 19 mL  Total IN: 19 mL    OUT:  Total OUT: 0 mL    Total NET: 19 mL          LABS                        8.3    13.26 )-----------( 75       ( 05 Oct 2023 05:30 )             23.7     10-05    134<L>  |  100  |  66<H>  ----------------------------<  110<H>  3.7   |  25  |  4.43<H>    Ca    9.5      05 Oct 2023 05:30  Phos  2.5     10-05  Mg     1.9     10-05    TPro  5.9<L>  /  Alb  2.5<L>  /  TBili  0.7  /  DBili  x   /  AST  86<H>  /  ALT  79<H>  /  AlkPhos  397<H>  10-03    LIVER FUNCTIONS - ( 03 Oct 2023 15:51 )  Alb: 2.5 g/dL / Pro: 5.9 g/dL / ALK PHOS: 397 U/L / ALT: 79 U/L / AST: 86 U/L / GGT: x                   Urinalysis Basic - ( 05 Oct 2023 05:30 )    Color: x / Appearance: x / SG: x / pH: x  Gluc: 110 mg/dL / Ketone: x  / Bili: x / Urobili: x   Blood: x / Protein: x / Nitrite: x   Leuk Esterase: x / RBC: x / WBC x   Sq Epi: x / Non Sq Epi: x / Bacteria: x            MEDICATIONS  (STANDING):  aspirin  chewable 81 milliGRAM(s) Oral daily  epoetin val (PROCRIT) Injectable 55287 Unit(s) IV Push <User Schedule>  folic acid 1 milliGRAM(s) Oral daily  hydrocortisone hemorrhoidal Suppository 1 Suppository(s) Rectal two times a day  influenza  Vaccine (HIGH DOSE) 0.7 milliLiter(s) IntraMuscular once  levothyroxine 137 MICROGram(s) Oral daily  mesalamine Suppository 1000 milliGRAM(s) Rectal at bedtime  midodrine 10 milliGRAM(s) Oral every 8 hours  mupirocin 2% Nasal 1 Application(s) Both Nostrils two times a day  norepinephrine Infusion 0.05 MICROgram(s)/kG/Min (6.81 mL/Hr) IV Continuous <Continuous>  pantoprazole    Tablet 40 milliGRAM(s) Oral before breakfast  polyethylene glycol 3350 17 Gram(s) Oral daily  senna 2 Tablet(s) Oral at bedtime    MEDICATIONS  (PRN):  acetaminophen     Tablet .. 650 milliGRAM(s) Oral every 6 hours PRN Mild Pain (1 - 3)  aluminum hydroxide/magnesium hydroxide/simethicone Suspension 10 milliLiter(s) Oral every 6 hours PRN dyspepsia      Allergies:  levofloxacin (Unknown)  alfuzosin (Unknown)  tamsulosin (Unknown)  Myrbetriq (Unknown)      Pt is 78 year old man with past medical history of CAD  (s/p CABG), Bioprosthetic MVR, ANAHI closure with AtriClip in 2021, Cardiomyopathy (EF 45-50% ) , Atrial fibrillation  on Apixiban, End stage renal disease via Rt chest wall permacath  ( Lt AVF unmatured)  with prior hospitalization in March at [facility] with pacemaker extraction secondary to MSSA bacteremia followed by replacement with Micra PPM. Recent  hospitalization at [facility] in August for rectal bleeding now presents with fever and cough, found to have septic shock, requiring pressor support, currently admitted to ICU. Blood cultures 9/30  positive for MRSA,  Pt remains on IV Vanco by trough and low dose Levophed,     #ID:  -9/30 Bcx +MRSA,10/2  tunneled catheter tip +MRSA- pt on Vanco dosed daily by trough.   - Bcx drawn 10/4 remain + for GPCocci clusters  - appreciate ID recommendations   -c/w Mucpirocin nasal bid  - given bacteremia and  bioprosthetic  valve, concern for endocarditis, however pt unable to tolerate ERENDIRA at this time 2/2 encephalopathy,     #PULM:  - concern for aspiration given poor mental status, pt appears to be protecting airway and managing secretions.     #RENAL:  - h/o ESRD on HD via Rt [place] HD cath,  Lt AVF intact, however not matured for use. Bedside HD scheduled for Fri 10/6   - K 3.7, Phos 2.5,   - infected tunneled HD catheter removed  -c/w EPO  - Nephrology recommendations appreciated.     # CARDIO:  -pt requiring Levophed for MAP goal >60, however requirements are decreasing  - midodrine 10 mg Q 8   - Apixaban held for thrombocytopenia,   ASA QD    - Cardio consult and recommendations appreciated  - will obtain ERENDIRA with pt able to protect airway and tolerate    # GI:   -NGT placed today for facilitation of PO meds and feeds  -c/e folic acid, mesalamine  -Protonix for GI prophylaxis  - c/w hydrocortisone suppository prn hemorrhoid pain     # ENDO:  -c/w synthroid    # HEME:  - thrombocytopenia improving  - EPO weekly   - venodynes for DVT ppx    # GOC/ETHICS:  -palliative following, HCP listed in chart as partner Surekha Maegan. She was at bedside today and updated on status.   -remains full code.                Significant recent/past 24 hr events:  Levophed requirements decreasing,     Subjective:    Review of Systems:   Unable to obtain due to altered mental status,, encephalopathy       HPI:   Patient is a 78y old  Male who presents with a chief complaint of Sepsis 2/2 bacteremia.  (05 Oct 2023 08:22)    PAST MEDICAL & SURGICAL HISTORY:  Chronic atrial fibrillation      History of BPH      CAD (coronary artery disease)      Coronary stent patent      ESRD on dialysis      History of GI bleed      Mitral valve replaced        FAMILY HISTORY:      Vitals   ICU Vital Signs Last 24 Hrs  T(C): 36.6 (05 Oct 2023 08:00), Max: 36.6 (04 Oct 2023 12:00)  T(F): 97.9 (05 Oct 2023 08:00), Max: 97.9 (04 Oct 2023 12:00)  HR: 101 (05 Oct 2023 10:30) (67 - 122)  BP: 108/68 (05 Oct 2023 10:30) (81/52 - 138/87)  BP(mean): 80 (05 Oct 2023 10:30) (57 - 105)  ABP: --  ABP(mean): --  RR: 16 (05 Oct 2023 10:30) (10 - 21)  SpO2: 99% (05 Oct 2023 10:30) (91% - 100%)    O2 Parameters below as of 05 Oct 2023 06:00  Patient On (Oxygen Delivery Method): nasal cannula  O2 Flow (L/min): 2          Physical Exam:   Constitutional: cacethic, weak, appears ill, confused and encephalopathic.    HEENT: normocephalic, atraumatic , conjunctiva normal B/L.  PERRLA, EOMI, no drainage or redness, no splenicterus neck supple,  No JVD, no thyromegaly, trachea is midline, oval cavity  pink with moist mucosa  Back: Normal spine flexure, No CVA tenderness, No deformity or limitation of movement  Respiratory: Breath Sounds equal & clear bilaterally to auscultation over all lung fields , no accessory muscle use noted, no wheezes, rales, rhonchi   Cardiovascular: irreg-irreg, rate controled @ , , normal S1, S2; no rubs, clicks, gallops murmurs,  no JVD, no peripheral edema  Abdomen:  Soft, non-tender, non distended, no hepatosplenomegaly, p+ bowel sounds in all 4 quadrants, , no masses felt  Extremities:  B/L  peripheral edema, no cyanosis, no clubbing   Vascular: Equal and normal pulses: 2+ peripheral pulses throughout  Musculoskeletal: No joint swelling or deformity; pt id bedbound with  limitation of movement at this time   Neurological:alert at times, not oriented to place, time or situation CN 2-12 grossly intact, follows basic commands  Skin: dry, thin ecchymotic, edematous 2/2 critical illness    VENT SETTINGS         I&O's Detail    04 Oct 2023 07:01  -  05 Oct 2023 07:00  --------------------------------------------------------  IN:    IV PiggyBack: 250 mL    Norepinephrine: 119 mL    Oral Fluid: 50 mL    Other (mL): 800 mL  Total IN: 1219 mL    OUT:    Other (mL): 3300 mL  Total OUT: 3300 mL    Total NET: -2081 mL      05 Oct 2023 07:01  -  05 Oct 2023 10:54  --------------------------------------------------------  IN:    Norepinephrine: 19 mL  Total IN: 19 mL    OUT:  Total OUT: 0 mL    Total NET: 19 mL          LABS                        8.3    13.26 )-----------( 75       ( 05 Oct 2023 05:30 )             23.7     10-05    134<L>  |  100  |  66<H>  ----------------------------<  110<H>  3.7   |  25  |  4.43<H>    Ca    9.5      05 Oct 2023 05:30  Phos  2.5     10-05  Mg     1.9     10-05    TPro  5.9<L>  /  Alb  2.5<L>  /  TBili  0.7  /  DBili  x   /  AST  86<H>  /  ALT  79<H>  /  AlkPhos  397<H>  10-03    LIVER FUNCTIONS - ( 03 Oct 2023 15:51 )  Alb: 2.5 g/dL / Pro: 5.9 g/dL / ALK PHOS: 397 U/L / ALT: 79 U/L / AST: 86 U/L / GGT: x                   Urinalysis Basic - ( 05 Oct 2023 05:30 )    Color: x / Appearance: x / SG: x / pH: x  Gluc: 110 mg/dL / Ketone: x  / Bili: x / Urobili: x   Blood: x / Protein: x / Nitrite: x   Leuk Esterase: x / RBC: x / WBC x   Sq Epi: x / Non Sq Epi: x / Bacteria: x            MEDICATIONS  (STANDING):  aspirin  chewable 81 milliGRAM(s) Oral daily  epoetin val (PROCRIT) Injectable 09273 Unit(s) IV Push <User Schedule>  folic acid 1 milliGRAM(s) Oral daily  hydrocortisone hemorrhoidal Suppository 1 Suppository(s) Rectal two times a day  influenza  Vaccine (HIGH DOSE) 0.7 milliLiter(s) IntraMuscular once  levothyroxine 137 MICROGram(s) Oral daily  mesalamine Suppository 1000 milliGRAM(s) Rectal at bedtime  midodrine 10 milliGRAM(s) Oral every 8 hours  mupirocin 2% Nasal 1 Application(s) Both Nostrils two times a day  norepinephrine Infusion 0.05 MICROgram(s)/kG/Min (6.81 mL/Hr) IV Continuous <Continuous>  pantoprazole    Tablet 40 milliGRAM(s) Oral before breakfast  polyethylene glycol 3350 17 Gram(s) Oral daily  senna 2 Tablet(s) Oral at bedtime    MEDICATIONS  (PRN):  acetaminophen     Tablet .. 650 milliGRAM(s) Oral every 6 hours PRN Mild Pain (1 - 3)  aluminum hydroxide/magnesium hydroxide/simethicone Suspension 10 milliLiter(s) Oral every 6 hours PRN dyspepsia      Allergies:  levofloxacin (Unknown)  alfuzosin (Unknown)  tamsulosin (Unknown)  Myrbetriq (Unknown)      Pt is 78 year old man with past medical history of CAD  (s/p CABG), Bioprosthetic MVR, ANAHI closure with AtriClip in 2021, Cardiomyopathy (EF 45-50% ) , Atrial fibrillation  on Apixiban, End stage renal disease via Rt chest wall permacath  ( Lt AVF unmatured)  with prior hospitalization in March at [facility] with pacemaker extraction secondary to MSSA bacteremia followed by replacement with Micra PPM. Recent  hospitalization at [facility] in August for rectal bleeding now presents with fever and cough, found to have septic shock, requiring pressor support, currently admitted to ICU. Blood cultures 9/30  positive for MRSA,  Pt remains on IV Vanco by trough and low dose Levophed,     #ID:  -9/30 Bcx +MRSA,10/2  tunneled catheter tip +MRSA- pt on Vanco dosed daily by trough.   - Bcx drawn 10/4 remain + for GPCocci clusters  - appreciate ID recommendations   -c/w Mucpirocin nasal bid  - given bacteremia and  bioprosthetic  valve, concern for endocarditis, however pt unable to tolerate ERENDIRA at this time 2/2 encephalopathy,     #PULM:  - concern for aspiration given poor mental status, pt appears to be protecting airway and managing secretions.     #RENAL:  - h/o ESRD on HD via Rt [place] HD cath,  Lt AVF intact, however not matured for use. Bedside HD scheduled for Fri 10/6   - K 3.7, Phos 2.5,   - infected tunneled HD catheter removed  -c/w EPO  - Nephrology recommendations appreciated.     # CARDIO:  -pt requiring Levophed for MAP goal >60, however requirements are decreasing  - midodrine 10 mg Q 8   - Apixaban held for thrombocytopenia,   ASA QD    - Cardio consult and recommendations appreciated  - will obtain ERENDIRA with pt able to protect airway and tolerate    # GI:   -NGT placed today for facilitation of PO meds and feeds  -c/e folic acid, mesalamine  -Protonix for GI prophylaxis  - c/w hydrocortisone suppository prn hemorrhoid pain     # ENDO:  -c/w synthroid    # HEME:  - thrombocytopenia improving  - EPO weekly   - venodynes for DVT ppx    # GOC/ETHICS:  -palliative following, HCP listed in chart as partner Surekha Maegan. She was at bedside today and updated on status.   -remains full code.                Significant recent/past 24 hr events:  Levophed requirements decreasing,     Subjective:    Review of Systems:   Unable to obtain due to altered mental status,, encephalopathy       HPI:   Patient is a 78y old  Male who presents with a chief complaint of Sepsis 2/2 bacteremia.  (05 Oct 2023 08:22)    PAST MEDICAL & SURGICAL HISTORY:  Chronic atrial fibrillation      History of BPH      CAD (coronary artery disease)      Coronary stent patent      ESRD on dialysis      History of GI bleed      Mitral valve replaced        FAMILY HISTORY:      Vitals   ICU Vital Signs Last 24 Hrs  T(C): 36.6 (05 Oct 2023 08:00), Max: 36.6 (04 Oct 2023 12:00)  T(F): 97.9 (05 Oct 2023 08:00), Max: 97.9 (04 Oct 2023 12:00)  HR: 101 (05 Oct 2023 10:30) (67 - 122)  BP: 108/68 (05 Oct 2023 10:30) (81/52 - 138/87)  BP(mean): 80 (05 Oct 2023 10:30) (57 - 105)  ABP: --  ABP(mean): --  RR: 16 (05 Oct 2023 10:30) (10 - 21)  SpO2: 99% (05 Oct 2023 10:30) (91% - 100%)    O2 Parameters below as of 05 Oct 2023 06:00  Patient On (Oxygen Delivery Method): nasal cannula  O2 Flow (L/min): 2          Physical Exam:   Constitutional: cacethic, weak, appears ill, confused and encephalopathic.    HEENT: normocephalic, atraumatic , conjunctiva normal B/L.  PERRLA, EOMI, no drainage or redness, no splenicterus neck supple,  No JVD, no thyromegaly, trachea is midline, oval cavity  pink with moist mucosa  Back: Normal spine flexure, No CVA tenderness, No deformity or limitation of movement  Respiratory: Breath Sounds equal & clear bilaterally to auscultation over all lung fields , no accessory muscle use noted, no wheezes, rales, rhonchi   Cardiovascular: irreg-irreg, rate controled @ , , normal S1, S2; no rubs, clicks, gallops murmurs,  no JVD, no peripheral edema  Abdomen:  Soft, non-tender, non distended, no hepatosplenomegaly, p+ bowel sounds in all 4 quadrants, , no masses felt  Extremities:  B/L  peripheral edema, no cyanosis, no clubbing   Vascular: Equal and normal pulses: 2+ peripheral pulses throughout  Musculoskeletal: No joint swelling or deformity; pt id bedbound with  limitation of movement at this time   Neurological:alert at times, not oriented to place, time or situation CN 2-12 grossly intact, follows basic commands  Skin: dry, thin ecchymotic, edematous 2/2 critical illness    VENT SETTINGS         I&O's Detail    04 Oct 2023 07:01  -  05 Oct 2023 07:00  --------------------------------------------------------  IN:    IV PiggyBack: 250 mL    Norepinephrine: 119 mL    Oral Fluid: 50 mL    Other (mL): 800 mL  Total IN: 1219 mL    OUT:    Other (mL): 3300 mL  Total OUT: 3300 mL    Total NET: -2081 mL      05 Oct 2023 07:01  -  05 Oct 2023 10:54  --------------------------------------------------------  IN:    Norepinephrine: 19 mL  Total IN: 19 mL    OUT:  Total OUT: 0 mL    Total NET: 19 mL          LABS                        8.3    13.26 )-----------( 75       ( 05 Oct 2023 05:30 )             23.7     10-05    134<L>  |  100  |  66<H>  ----------------------------<  110<H>  3.7   |  25  |  4.43<H>    Ca    9.5      05 Oct 2023 05:30  Phos  2.5     10-05  Mg     1.9     10-05    TPro  5.9<L>  /  Alb  2.5<L>  /  TBili  0.7  /  DBili  x   /  AST  86<H>  /  ALT  79<H>  /  AlkPhos  397<H>  10-03    LIVER FUNCTIONS - ( 03 Oct 2023 15:51 )  Alb: 2.5 g/dL / Pro: 5.9 g/dL / ALK PHOS: 397 U/L / ALT: 79 U/L / AST: 86 U/L / GGT: x                   Urinalysis Basic - ( 05 Oct 2023 05:30 )    Color: x / Appearance: x / SG: x / pH: x  Gluc: 110 mg/dL / Ketone: x  / Bili: x / Urobili: x   Blood: x / Protein: x / Nitrite: x   Leuk Esterase: x / RBC: x / WBC x   Sq Epi: x / Non Sq Epi: x / Bacteria: x            MEDICATIONS  (STANDING):  aspirin  chewable 81 milliGRAM(s) Oral daily  epoetin val (PROCRIT) Injectable 70339 Unit(s) IV Push <User Schedule>  folic acid 1 milliGRAM(s) Oral daily  hydrocortisone hemorrhoidal Suppository 1 Suppository(s) Rectal two times a day  influenza  Vaccine (HIGH DOSE) 0.7 milliLiter(s) IntraMuscular once  levothyroxine 137 MICROGram(s) Oral daily  mesalamine Suppository 1000 milliGRAM(s) Rectal at bedtime  midodrine 10 milliGRAM(s) Oral every 8 hours  mupirocin 2% Nasal 1 Application(s) Both Nostrils two times a day  norepinephrine Infusion 0.05 MICROgram(s)/kG/Min (6.81 mL/Hr) IV Continuous <Continuous>  pantoprazole    Tablet 40 milliGRAM(s) Oral before breakfast  polyethylene glycol 3350 17 Gram(s) Oral daily  senna 2 Tablet(s) Oral at bedtime    MEDICATIONS  (PRN):  acetaminophen     Tablet .. 650 milliGRAM(s) Oral every 6 hours PRN Mild Pain (1 - 3)  aluminum hydroxide/magnesium hydroxide/simethicone Suspension 10 milliLiter(s) Oral every 6 hours PRN dyspepsia      Allergies:  levofloxacin (Unknown)  alfuzosin (Unknown)  tamsulosin (Unknown)  Myrbetriq (Unknown)      Pt is 78 year old man with past medical history of CAD  (s/p CABG), Bioprosthetic MVR, ANAHI closure with AtriClip in 2021, Cardiomyopathy (EF 45-50% ) , Atrial fibrillation  on Apixiban, End stage renal disease via Rt chest wall permacath  ( Lt AVF unmatured)  with prior hospitalization in March at [facility] with pacemaker extraction secondary to MSSA bacteremia followed by replacement with Micra PPM. Recent  hospitalization at [facility] in August for rectal bleeding now presents with fever and cough, found to have septic shock, requiring pressor support, currently admitted to ICU. Blood cultures 9/30  positive for MRSA,  Pt remains on IV Vanco by trough and low dose Levophed,     #ID:  -9/30 Bcx +MRSA,10/2  tunneled catheter tip +MRSA- pt on Vanco dosed daily by trough.   - Bcx drawn 10/4 remain + for GPCocci clusters  - appreciate ID recommendations   -c/w Mucpirocin nasal bid  - given bacteremia and  bioprosthetic  valve, concern for endocarditis, however pt unable to tolerate ERENDIRA at this time 2/2 encephalopathy,     #PULM:  - concern for aspiration given poor mental status, pt appears to be protecting airway and managing secretions.     #RENAL:  - h/o ESRD on HD via Rt [place] HD cath,  Lt AVF intact, however not matured for use. Bedside HD scheduled for Fri 10/6   - K 3.7, Phos 2.5,   - infected tunneled HD catheter removed  -c/w EPO  - Nephrology recommendations appreciated.     # CARDIO:  -pt requiring Levophed for MAP goal >60, however requirements are decreasing  - midodrine 10 mg Q 8   - Apixaban held for thrombocytopenia,   ASA QD    - Cardio consult and recommendations appreciated  - will obtain ERENDIRA with pt able to protect airway and tolerate    # GI:   -NGT placed today for facilitation of PO meds and feeds  -c/e folic acid, mesalamine  -Protonix for GI prophylaxis  - c/w hydrocortisone suppository prn hemorrhoid pain     # ENDO:  -c/w synthroid    # HEME:  - thrombocytopenia improving  - EPO weekly   - venodynes for DVT ppx    # GOC/ETHICS:  -palliative following, HCP listed in chart as partner Surekha Maegan. She was at bedside today and updated on status.   -remains full code.                Significant recent/past 24 hr events:  Levophed requirements decreasing,     Subjective:    Review of Systems:   Unable to obtain due to altered mental status,, encephalopathy       HPI:   Patient is a 78y old  Male who presents with a chief complaint of Sepsis 2/2 bacteremia.  (05 Oct 2023 08:22)    PAST MEDICAL & SURGICAL HISTORY:  Chronic atrial fibrillation      History of BPH      CAD (coronary artery disease)      Coronary stent patent      ESRD on dialysis      History of GI bleed      Mitral valve replaced        FAMILY HISTORY:      Vitals   ICU Vital Signs Last 24 Hrs  T(C): 36.6 (05 Oct 2023 08:00), Max: 36.6 (04 Oct 2023 12:00)  T(F): 97.9 (05 Oct 2023 08:00), Max: 97.9 (04 Oct 2023 12:00)  HR: 101 (05 Oct 2023 10:30) (67 - 122)  BP: 108/68 (05 Oct 2023 10:30) (81/52 - 138/87)  BP(mean): 80 (05 Oct 2023 10:30) (57 - 105)  ABP: --  ABP(mean): --  RR: 16 (05 Oct 2023 10:30) (10 - 21)  SpO2: 99% (05 Oct 2023 10:30) (91% - 100%)    O2 Parameters below as of 05 Oct 2023 06:00  Patient On (Oxygen Delivery Method): nasal cannula  O2 Flow (L/min): 2          Physical Exam:   Constitutional: cacethic, weak, appears ill, confused and encephalopathic.    HEENT: normocephalic, atraumatic , conjunctiva normal B/L.  PERRLA, EOMI, no drainage or redness, no splenicterus neck supple,  No JVD, no thyromegaly, trachea is midline, oval cavity  pink with moist mucosa  Back: Normal spine flexure, No CVA tenderness, No deformity or limitation of movement  Respiratory: Breath Sounds equal & clear bilaterally to auscultation over all lung fields , no accessory muscle use noted, no wheezes, rales, rhonchi   Cardiovascular: irreg-irreg, rate controled @ , , normal S1, S2; no rubs, clicks, gallops murmurs,  no JVD, no peripheral edema  Abdomen:  Soft, non-tender, non distended, no hepatosplenomegaly, p+ bowel sounds in all 4 quadrants, , no masses felt  Extremities:  B/L  peripheral edema, no cyanosis, no clubbing   Vascular: Equal and normal pulses: 2+ peripheral pulses throughout  Musculoskeletal: No joint swelling or deformity; pt id bedbound with  limitation of movement at this time   Neurological:alert at times, not oriented to place, time or situation CN 2-12 grossly intact, follows basic commands  Skin: dry, thin ecchymotic, edematous 2/2 critical illness    VENT SETTINGS         I&O's Detail    04 Oct 2023 07:01  -  05 Oct 2023 07:00  --------------------------------------------------------  IN:    IV PiggyBack: 250 mL    Norepinephrine: 119 mL    Oral Fluid: 50 mL    Other (mL): 800 mL  Total IN: 1219 mL    OUT:    Other (mL): 3300 mL  Total OUT: 3300 mL    Total NET: -2081 mL      05 Oct 2023 07:01  -  05 Oct 2023 10:54  --------------------------------------------------------  IN:    Norepinephrine: 19 mL  Total IN: 19 mL    OUT:  Total OUT: 0 mL    Total NET: 19 mL          LABS                        8.3    13.26 )-----------( 75       ( 05 Oct 2023 05:30 )             23.7     10-05    134<L>  |  100  |  66<H>  ----------------------------<  110<H>  3.7   |  25  |  4.43<H>    Ca    9.5      05 Oct 2023 05:30  Phos  2.5     10-05  Mg     1.9     10-05    TPro  5.9<L>  /  Alb  2.5<L>  /  TBili  0.7  /  DBili  x   /  AST  86<H>  /  ALT  79<H>  /  AlkPhos  397<H>  10-03    LIVER FUNCTIONS - ( 03 Oct 2023 15:51 )  Alb: 2.5 g/dL / Pro: 5.9 g/dL / ALK PHOS: 397 U/L / ALT: 79 U/L / AST: 86 U/L / GGT: x                   Urinalysis Basic - ( 05 Oct 2023 05:30 )    Color: x / Appearance: x / SG: x / pH: x  Gluc: 110 mg/dL / Ketone: x  / Bili: x / Urobili: x   Blood: x / Protein: x / Nitrite: x   Leuk Esterase: x / RBC: x / WBC x   Sq Epi: x / Non Sq Epi: x / Bacteria: x            MEDICATIONS  (STANDING):  aspirin  chewable 81 milliGRAM(s) Oral daily  epoetin val (PROCRIT) Injectable 32017 Unit(s) IV Push <User Schedule>  folic acid 1 milliGRAM(s) Oral daily  hydrocortisone hemorrhoidal Suppository 1 Suppository(s) Rectal two times a day  influenza  Vaccine (HIGH DOSE) 0.7 milliLiter(s) IntraMuscular once  levothyroxine 137 MICROGram(s) Oral daily  mesalamine Suppository 1000 milliGRAM(s) Rectal at bedtime  midodrine 10 milliGRAM(s) Oral every 8 hours  mupirocin 2% Nasal 1 Application(s) Both Nostrils two times a day  norepinephrine Infusion 0.05 MICROgram(s)/kG/Min (6.81 mL/Hr) IV Continuous <Continuous>  pantoprazole    Tablet 40 milliGRAM(s) Oral before breakfast  polyethylene glycol 3350 17 Gram(s) Oral daily  senna 2 Tablet(s) Oral at bedtime    MEDICATIONS  (PRN):  acetaminophen     Tablet .. 650 milliGRAM(s) Oral every 6 hours PRN Mild Pain (1 - 3)  aluminum hydroxide/magnesium hydroxide/simethicone Suspension 10 milliLiter(s) Oral every 6 hours PRN dyspepsia      Allergies:  levofloxacin (Unknown)  alfuzosin (Unknown)  tamsulosin (Unknown)  Myrbetriq (Unknown)      Pt is 78 year old man with past medical history of CAD  (s/p CABG), Bioprosthetic MVR, ANAHI closure with AtriClip in 2021, Cardiomyopathy (EF 45-50% ) , Atrial fibrillation  on Apixiban, End stage renal disease via Rt chest wall permacath  ( Lt AVF unmatured)  with prior hospitalization in March at [facility] with pacemaker extraction secondary to MSSA bacteremia followed by replacement with Micra PPM. Recent  hospitalization at [facility] in August for rectal bleeding now presents with fever and cough, found to have septic shock, requiring pressor support, currently admitted to ICU. Blood cultures 9/30  positive for MRSA,  Pt remains on IV Vanco by trough and low dose Levophed,     #ID:  -9/30 Bcx +MRSA,10/2  tunneled catheter tip +MRSA- pt on Vanco dosed daily by trough.   - Bcx drawn 10/4 remain + for GPCocci clusters  -WBC downtrending   - appreciate ID recommendations   -c/w Mucpirocin nasal bid  - given bacteremia and  bioprosthetic  valve, concern for endocarditis, however pt unable to tolerate ERENDIRA at this time 2/2 encephalopathy,     #PULM:  - concern for aspiration given poor mental status, pt appears to be protecting airway and managing secretions.     #RENAL:  - h/o ESRD on HD via Rt shiley HD cath,  Lt AVF intact, however not matured for use. Bedside HD scheduled for Fri 10/6   - K 3.7, Phos 2.5,   - infected tunneled HD catheter removed  -c/w EPO  - Nephrology recommendations appreciated.     # CARDIO:  -pt requiring Levophed for MAP goal >60, however requirements are decreasing  - midodrine 10 mg Q 8   - Apixaban held for thrombocytopenia,   ASA QD    - Cardio consult and recommendations appreciated  - will obtain ERENDIRA with pt able to protect airway and tolerate    # GI:   -NGT placed today for facilitation of PO meds and feeds, starting Nephro   -c/e folic acid, mesalamine  -Protonix for GI prophylaxis  - c/w hydrocortisone suppository prn hemorrhoid pain     # ENDO:  -c/w synthroid    # HEME:  - thrombocytopenia improving, pts 72 today   -appreciate heme recommendations   - EPO weekly   - venodynes for DVT ppx    # GOC/ETHICS:  -palliative following, HCP listed in chart as partner Surekha Issa. She was at bedside today and updated on status.   -remains full code.                Significant recent/past 24 hr events:  Levophed requirements decreasing,     Subjective:    Review of Systems:   Unable to obtain due to altered mental status,, encephalopathy       HPI:   Patient is a 78y old  Male who presents with a chief complaint of Sepsis 2/2 bacteremia.  (05 Oct 2023 08:22)    PAST MEDICAL & SURGICAL HISTORY:  Chronic atrial fibrillation      History of BPH      CAD (coronary artery disease)      Coronary stent patent      ESRD on dialysis      History of GI bleed      Mitral valve replaced        FAMILY HISTORY:      Vitals   ICU Vital Signs Last 24 Hrs  T(C): 36.6 (05 Oct 2023 08:00), Max: 36.6 (04 Oct 2023 12:00)  T(F): 97.9 (05 Oct 2023 08:00), Max: 97.9 (04 Oct 2023 12:00)  HR: 101 (05 Oct 2023 10:30) (67 - 122)  BP: 108/68 (05 Oct 2023 10:30) (81/52 - 138/87)  BP(mean): 80 (05 Oct 2023 10:30) (57 - 105)  ABP: --  ABP(mean): --  RR: 16 (05 Oct 2023 10:30) (10 - 21)  SpO2: 99% (05 Oct 2023 10:30) (91% - 100%)    O2 Parameters below as of 05 Oct 2023 06:00  Patient On (Oxygen Delivery Method): nasal cannula  O2 Flow (L/min): 2          Physical Exam:   Constitutional: cacethic, weak, appears ill, confused and encephalopathic.    HEENT: normocephalic, atraumatic , conjunctiva normal B/L.  PERRLA, EOMI, no drainage or redness, no splenicterus neck supple,  No JVD, no thyromegaly, trachea is midline, oval cavity  pink with moist mucosa  Back: Normal spine flexure, No CVA tenderness, No deformity or limitation of movement  Respiratory: Breath Sounds equal & clear bilaterally to auscultation over all lung fields , no accessory muscle use noted, no wheezes, rales, rhonchi   Cardiovascular: irreg-irreg, rate controled @ , , normal S1, S2; no rubs, clicks, gallops murmurs,  no JVD, no peripheral edema  Abdomen:  Soft, non-tender, non distended, no hepatosplenomegaly, p+ bowel sounds in all 4 quadrants, , no masses felt  Extremities:  B/L  peripheral edema, no cyanosis, no clubbing   Vascular: Equal and normal pulses: 2+ peripheral pulses throughout  Musculoskeletal: No joint swelling or deformity; pt id bedbound with  limitation of movement at this time   Neurological:alert at times, not oriented to place, time or situation CN 2-12 grossly intact, follows basic commands  Skin: dry, thin ecchymotic, edematous 2/2 critical illness    VENT SETTINGS         I&O's Detail    04 Oct 2023 07:01  -  05 Oct 2023 07:00  --------------------------------------------------------  IN:    IV PiggyBack: 250 mL    Norepinephrine: 119 mL    Oral Fluid: 50 mL    Other (mL): 800 mL  Total IN: 1219 mL    OUT:    Other (mL): 3300 mL  Total OUT: 3300 mL    Total NET: -2081 mL      05 Oct 2023 07:01  -  05 Oct 2023 10:54  --------------------------------------------------------  IN:    Norepinephrine: 19 mL  Total IN: 19 mL    OUT:  Total OUT: 0 mL    Total NET: 19 mL          LABS                        8.3    13.26 )-----------( 75       ( 05 Oct 2023 05:30 )             23.7     10-05    134<L>  |  100  |  66<H>  ----------------------------<  110<H>  3.7   |  25  |  4.43<H>    Ca    9.5      05 Oct 2023 05:30  Phos  2.5     10-05  Mg     1.9     10-05    TPro  5.9<L>  /  Alb  2.5<L>  /  TBili  0.7  /  DBili  x   /  AST  86<H>  /  ALT  79<H>  /  AlkPhos  397<H>  10-03    LIVER FUNCTIONS - ( 03 Oct 2023 15:51 )  Alb: 2.5 g/dL / Pro: 5.9 g/dL / ALK PHOS: 397 U/L / ALT: 79 U/L / AST: 86 U/L / GGT: x                   Urinalysis Basic - ( 05 Oct 2023 05:30 )    Color: x / Appearance: x / SG: x / pH: x  Gluc: 110 mg/dL / Ketone: x  / Bili: x / Urobili: x   Blood: x / Protein: x / Nitrite: x   Leuk Esterase: x / RBC: x / WBC x   Sq Epi: x / Non Sq Epi: x / Bacteria: x            MEDICATIONS  (STANDING):  aspirin  chewable 81 milliGRAM(s) Oral daily  epoetin val (PROCRIT) Injectable 98610 Unit(s) IV Push <User Schedule>  folic acid 1 milliGRAM(s) Oral daily  hydrocortisone hemorrhoidal Suppository 1 Suppository(s) Rectal two times a day  influenza  Vaccine (HIGH DOSE) 0.7 milliLiter(s) IntraMuscular once  levothyroxine 137 MICROGram(s) Oral daily  mesalamine Suppository 1000 milliGRAM(s) Rectal at bedtime  midodrine 10 milliGRAM(s) Oral every 8 hours  mupirocin 2% Nasal 1 Application(s) Both Nostrils two times a day  norepinephrine Infusion 0.05 MICROgram(s)/kG/Min (6.81 mL/Hr) IV Continuous <Continuous>  pantoprazole    Tablet 40 milliGRAM(s) Oral before breakfast  polyethylene glycol 3350 17 Gram(s) Oral daily  senna 2 Tablet(s) Oral at bedtime    MEDICATIONS  (PRN):  acetaminophen     Tablet .. 650 milliGRAM(s) Oral every 6 hours PRN Mild Pain (1 - 3)  aluminum hydroxide/magnesium hydroxide/simethicone Suspension 10 milliLiter(s) Oral every 6 hours PRN dyspepsia      Allergies:  levofloxacin (Unknown)  alfuzosin (Unknown)  tamsulosin (Unknown)  Myrbetriq (Unknown)      Pt is 78 year old man with past medical history of CAD  (s/p CABG), Bioprosthetic MVR, ANAHI closure with AtriClip in 2021, Cardiomyopathy (EF 45-50% ) , Atrial fibrillation  on Apixiban, End stage renal disease via Rt chest wall permacath  ( Lt AVF unmatured)  with prior hospitalization in March at [facility] with pacemaker extraction secondary to MSSA bacteremia followed by replacement with Micra PPM. Recent  hospitalization at [facility] in August for rectal bleeding now presents with fever and cough, found to have septic shock, requiring pressor support, currently admitted to ICU. Blood cultures 9/30  positive for MRSA,  Pt remains on IV Vanco by trough and low dose Levophed,     #ID:  -9/30 Bcx +MRSA,10/2  tunneled catheter tip +MRSA- pt on Vanco dosed daily by trough.   - Bcx drawn 10/4 remain + for GPCocci clusters  -WBC downtrending   - appreciate ID recommendations   -c/w Mucpirocin nasal bid  - given bacteremia and  bioprosthetic  valve, concern for endocarditis, however pt unable to tolerate ERENDIRA at this time 2/2 encephalopathy,     #PULM:  - concern for aspiration given poor mental status, pt appears to be protecting airway and managing secretions.     #RENAL:  - h/o ESRD on HD via Rt shiley HD cath,  Lt AVF intact, however not matured for use. Bedside HD scheduled for Fri 10/6   - K 3.7, Phos 2.5,   - infected tunneled HD catheter removed  -c/w EPO  - Nephrology recommendations appreciated.     # CARDIO:  -pt requiring Levophed for MAP goal >60, however requirements are decreasing  - midodrine 10 mg Q 8   - Apixaban held for thrombocytopenia,   ASA QD    - Cardio consult and recommendations appreciated  - will obtain ERENDIRA with pt able to protect airway and tolerate    # GI:   -NGT placed today for facilitation of PO meds and feeds, starting Nephro   -c/e folic acid, mesalamine  -Protonix for GI prophylaxis  - c/w hydrocortisone suppository prn hemorrhoid pain     # ENDO:  -c/w synthroid    # HEME:  - thrombocytopenia improving, pts 72 today   -appreciate heme recommendations   - EPO weekly   - venodynes for DVT ppx    # GOC/ETHICS:  -palliative following, HCP listed in chart as partner Surekha Issa. She was at bedside today and updated on status.   -remains full code.                Significant recent/past 24 hr events:  Levophed requirements decreasing,     Subjective:    Review of Systems:   Unable to obtain due to altered mental status,, encephalopathy       HPI:   Patient is a 78y old  Male who presents with a chief complaint of Sepsis 2/2 bacteremia.  (05 Oct 2023 08:22)    PAST MEDICAL & SURGICAL HISTORY:  Chronic atrial fibrillation      History of BPH      CAD (coronary artery disease)      Coronary stent patent      ESRD on dialysis      History of GI bleed      Mitral valve replaced        FAMILY HISTORY:      Vitals   ICU Vital Signs Last 24 Hrs  T(C): 36.6 (05 Oct 2023 08:00), Max: 36.6 (04 Oct 2023 12:00)  T(F): 97.9 (05 Oct 2023 08:00), Max: 97.9 (04 Oct 2023 12:00)  HR: 101 (05 Oct 2023 10:30) (67 - 122)  BP: 108/68 (05 Oct 2023 10:30) (81/52 - 138/87)  BP(mean): 80 (05 Oct 2023 10:30) (57 - 105)  ABP: --  ABP(mean): --  RR: 16 (05 Oct 2023 10:30) (10 - 21)  SpO2: 99% (05 Oct 2023 10:30) (91% - 100%)    O2 Parameters below as of 05 Oct 2023 06:00  Patient On (Oxygen Delivery Method): nasal cannula  O2 Flow (L/min): 2          Physical Exam:   Constitutional: cacethic, weak, appears ill, confused and encephalopathic.    HEENT: normocephalic, atraumatic , conjunctiva normal B/L.  PERRLA, EOMI, no drainage or redness, no splenicterus neck supple,  No JVD, no thyromegaly, trachea is midline, oval cavity  pink with moist mucosa  Back: Normal spine flexure, No CVA tenderness, No deformity or limitation of movement  Respiratory: Breath Sounds equal & clear bilaterally to auscultation over all lung fields , no accessory muscle use noted, no wheezes, rales, rhonchi   Cardiovascular: irreg-irreg, rate controled @ , , normal S1, S2; no rubs, clicks, gallops murmurs,  no JVD, no peripheral edema  Abdomen:  Soft, non-tender, non distended, no hepatosplenomegaly, p+ bowel sounds in all 4 quadrants, , no masses felt  Extremities:  B/L  peripheral edema, no cyanosis, no clubbing   Vascular: Equal and normal pulses: 2+ peripheral pulses throughout  Musculoskeletal: No joint swelling or deformity; pt id bedbound with  limitation of movement at this time   Neurological:alert at times, not oriented to place, time or situation CN 2-12 grossly intact, follows basic commands  Skin: dry, thin ecchymotic, edematous 2/2 critical illness    VENT SETTINGS         I&O's Detail    04 Oct 2023 07:01  -  05 Oct 2023 07:00  --------------------------------------------------------  IN:    IV PiggyBack: 250 mL    Norepinephrine: 119 mL    Oral Fluid: 50 mL    Other (mL): 800 mL  Total IN: 1219 mL    OUT:    Other (mL): 3300 mL  Total OUT: 3300 mL    Total NET: -2081 mL      05 Oct 2023 07:01  -  05 Oct 2023 10:54  --------------------------------------------------------  IN:    Norepinephrine: 19 mL  Total IN: 19 mL    OUT:  Total OUT: 0 mL    Total NET: 19 mL          LABS                        8.3    13.26 )-----------( 75       ( 05 Oct 2023 05:30 )             23.7     10-05    134<L>  |  100  |  66<H>  ----------------------------<  110<H>  3.7   |  25  |  4.43<H>    Ca    9.5      05 Oct 2023 05:30  Phos  2.5     10-05  Mg     1.9     10-05    TPro  5.9<L>  /  Alb  2.5<L>  /  TBili  0.7  /  DBili  x   /  AST  86<H>  /  ALT  79<H>  /  AlkPhos  397<H>  10-03    LIVER FUNCTIONS - ( 03 Oct 2023 15:51 )  Alb: 2.5 g/dL / Pro: 5.9 g/dL / ALK PHOS: 397 U/L / ALT: 79 U/L / AST: 86 U/L / GGT: x                   Urinalysis Basic - ( 05 Oct 2023 05:30 )    Color: x / Appearance: x / SG: x / pH: x  Gluc: 110 mg/dL / Ketone: x  / Bili: x / Urobili: x   Blood: x / Protein: x / Nitrite: x   Leuk Esterase: x / RBC: x / WBC x   Sq Epi: x / Non Sq Epi: x / Bacteria: x            MEDICATIONS  (STANDING):  aspirin  chewable 81 milliGRAM(s) Oral daily  epoetin val (PROCRIT) Injectable 81527 Unit(s) IV Push <User Schedule>  folic acid 1 milliGRAM(s) Oral daily  hydrocortisone hemorrhoidal Suppository 1 Suppository(s) Rectal two times a day  influenza  Vaccine (HIGH DOSE) 0.7 milliLiter(s) IntraMuscular once  levothyroxine 137 MICROGram(s) Oral daily  mesalamine Suppository 1000 milliGRAM(s) Rectal at bedtime  midodrine 10 milliGRAM(s) Oral every 8 hours  mupirocin 2% Nasal 1 Application(s) Both Nostrils two times a day  norepinephrine Infusion 0.05 MICROgram(s)/kG/Min (6.81 mL/Hr) IV Continuous <Continuous>  pantoprazole    Tablet 40 milliGRAM(s) Oral before breakfast  polyethylene glycol 3350 17 Gram(s) Oral daily  senna 2 Tablet(s) Oral at bedtime    MEDICATIONS  (PRN):  acetaminophen     Tablet .. 650 milliGRAM(s) Oral every 6 hours PRN Mild Pain (1 - 3)  aluminum hydroxide/magnesium hydroxide/simethicone Suspension 10 milliLiter(s) Oral every 6 hours PRN dyspepsia      Allergies:  levofloxacin (Unknown)  alfuzosin (Unknown)  tamsulosin (Unknown)  Myrbetriq (Unknown)      Pt is 78 year old man with past medical history of CAD  (s/p CABG), Bioprosthetic MVR, ANAHI closure with AtriClip in 2021, Cardiomyopathy (EF 45-50% ) , Atrial fibrillation  on Apixiban, End stage renal disease via Rt chest wall permacath  ( Lt AVF unmatured)  with prior hospitalization in March at [facility] with pacemaker extraction secondary to MSSA bacteremia followed by replacement with Micra PPM. Recent  hospitalization at [facility] in August for rectal bleeding now presents with fever and cough, found to have septic shock, requiring pressor support, currently admitted to ICU. Blood cultures 9/30  positive for MRSA,  Pt remains on IV Vanco by trough and low dose Levophed,     #ID:  -9/30 Bcx +MRSA,10/2  tunneled catheter tip +MRSA- pt on Vanco dosed daily by trough.   - Bcx drawn 10/4 remain + for GPCocci clusters  -WBC downtrending   - appreciate ID recommendations   -c/w Mucpirocin nasal bid  - given bacteremia and  bioprosthetic  valve, concern for endocarditis, however pt unable to tolerate ERENDIRA at this time 2/2 encephalopathy,     #PULM:  - concern for aspiration given poor mental status, pt appears to be protecting airway and managing secretions.     #RENAL:  - h/o ESRD on HD via Rt shiley HD cath,  Lt AVF intact, however not matured for use. Bedside HD scheduled for Fri 10/6   - K 3.7, Phos 2.5,   - infected tunneled HD catheter removed  -c/w EPO  - Nephrology recommendations appreciated.     # CARDIO:  -pt requiring Levophed for MAP goal >60, however requirements are decreasing  - midodrine 10 mg Q 8   - Apixaban held for thrombocytopenia,   ASA QD    - Cardio consult and recommendations appreciated  - will obtain ERENDIRA with pt able to protect airway and tolerate    # GI:   -NGT placed today for facilitation of PO meds and feeds, starting Nephro   -c/e folic acid, mesalamine  -Protonix for GI prophylaxis  - c/w hydrocortisone suppository prn hemorrhoid pain     # ENDO:  -c/w synthroid    # HEME:  - thrombocytopenia improving, pts 72 today   -appreciate heme recommendations   - EPO weekly   - venodynes for DVT ppx    # GOC/ETHICS:  -palliative following, HCP listed in chart as partner Surekha Issa. She was at bedside today and updated on status.   -remains full code.                Significant recent/past 24 hr events:  Levophed requirements decreasing,     Subjective:  not eating much    Review of Systems:   Unable to obtain due to altered mental status,, encephalopathy       HPI:   Patient is a 78y old  Male who presents with a chief complaint of Sepsis 2/2 bacteremia.  (05 Oct 2023 08:22)    PAST MEDICAL & SURGICAL HISTORY:  Chronic atrial fibrillation      History of BPH      CAD (coronary artery disease)      Coronary stent patent      ESRD on dialysis      History of GI bleed      Mitral valve replaced        FAMILY HISTORY:      Vitals   ICU Vital Signs Last 24 Hrs  T(C): 36.6 (05 Oct 2023 08:00), Max: 36.6 (04 Oct 2023 12:00)  T(F): 97.9 (05 Oct 2023 08:00), Max: 97.9 (04 Oct 2023 12:00)  HR: 101 (05 Oct 2023 10:30) (67 - 122)  BP: 108/68 (05 Oct 2023 10:30) (81/52 - 138/87)  BP(mean): 80 (05 Oct 2023 10:30) (57 - 105)  ABP: --  ABP(mean): --  RR: 16 (05 Oct 2023 10:30) (10 - 21)  SpO2: 99% (05 Oct 2023 10:30) (91% - 100%)    O2 Parameters below as of 05 Oct 2023 06:00  Patient On (Oxygen Delivery Method): nasal cannula  O2 Flow (L/min): 2          Physical Exam:   Constitutional: cacethic, weak, appears ill, confused and encephalopathic.    HEENT: normocephalic, atraumatic , conjunctiva normal B/L.  PERRLA, EOMI, no drainage or redness, no splenicterus neck supple,  No JVD, no thyromegaly, trachea is midline, oval cavity  pink with moist mucosa  Back: Normal spine flexure, No CVA tenderness, No deformity or limitation of movement  Respiratory: Breath Sounds equal & clear bilaterally to auscultation over all lung fields , no accessory muscle use noted, no wheezes, rales, rhonchi   Cardiovascular: irreg-irreg, rate controled @ , , normal S1, S2; no rubs, clicks, gallops murmurs,  no JVD, no peripheral edema  Abdomen:  Soft, non-tender, non distended, no hepatosplenomegaly, p+ bowel sounds in all 4 quadrants, , no masses felt  Extremities:  B/L  peripheral edema, no cyanosis, no clubbing   Vascular: Equal and normal pulses: 2+ peripheral pulses throughout  Musculoskeletal: No joint swelling or deformity; pt id bedbound with  limitation of movement at this time   Neurological:alert at times, not oriented to place, time or situation CN 2-12 grossly intact, follows basic commands  Skin: dry, thin ecchymotic, edematous 2/2 critical illness    VENT SETTINGS         I&O's Detail    04 Oct 2023 07:01  -  05 Oct 2023 07:00  --------------------------------------------------------  IN:    IV PiggyBack: 250 mL    Norepinephrine: 119 mL    Oral Fluid: 50 mL    Other (mL): 800 mL  Total IN: 1219 mL    OUT:    Other (mL): 3300 mL  Total OUT: 3300 mL    Total NET: -2081 mL      05 Oct 2023 07:01  -  05 Oct 2023 10:54  --------------------------------------------------------  IN:    Norepinephrine: 19 mL  Total IN: 19 mL    OUT:  Total OUT: 0 mL    Total NET: 19 mL          LABS                        8.3    13.26 )-----------( 75       ( 05 Oct 2023 05:30 )             23.7     10-05    134<L>  |  100  |  66<H>  ----------------------------<  110<H>  3.7   |  25  |  4.43<H>    Ca    9.5      05 Oct 2023 05:30  Phos  2.5     10-05  Mg     1.9     10-05    TPro  5.9<L>  /  Alb  2.5<L>  /  TBili  0.7  /  DBili  x   /  AST  86<H>  /  ALT  79<H>  /  AlkPhos  397<H>  10-03    LIVER FUNCTIONS - ( 03 Oct 2023 15:51 )  Alb: 2.5 g/dL / Pro: 5.9 g/dL / ALK PHOS: 397 U/L / ALT: 79 U/L / AST: 86 U/L / GGT: x                   Urinalysis Basic - ( 05 Oct 2023 05:30 )    Color: x / Appearance: x / SG: x / pH: x  Gluc: 110 mg/dL / Ketone: x  / Bili: x / Urobili: x   Blood: x / Protein: x / Nitrite: x   Leuk Esterase: x / RBC: x / WBC x   Sq Epi: x / Non Sq Epi: x / Bacteria: x            MEDICATIONS  (STANDING):  aspirin  chewable 81 milliGRAM(s) Oral daily  epoetin val (PROCRIT) Injectable 94591 Unit(s) IV Push <User Schedule>  folic acid 1 milliGRAM(s) Oral daily  hydrocortisone hemorrhoidal Suppository 1 Suppository(s) Rectal two times a day  influenza  Vaccine (HIGH DOSE) 0.7 milliLiter(s) IntraMuscular once  levothyroxine 137 MICROGram(s) Oral daily  mesalamine Suppository 1000 milliGRAM(s) Rectal at bedtime  midodrine 10 milliGRAM(s) Oral every 8 hours  mupirocin 2% Nasal 1 Application(s) Both Nostrils two times a day  norepinephrine Infusion 0.05 MICROgram(s)/kG/Min (6.81 mL/Hr) IV Continuous <Continuous>  pantoprazole    Tablet 40 milliGRAM(s) Oral before breakfast  polyethylene glycol 3350 17 Gram(s) Oral daily  senna 2 Tablet(s) Oral at bedtime    MEDICATIONS  (PRN):  acetaminophen     Tablet .. 650 milliGRAM(s) Oral every 6 hours PRN Mild Pain (1 - 3)  aluminum hydroxide/magnesium hydroxide/simethicone Suspension 10 milliLiter(s) Oral every 6 hours PRN dyspepsia      Allergies:  levofloxacin (Unknown)  alfuzosin (Unknown)  tamsulosin (Unknown)  Myrbetriq (Unknown)      Pt is 78 year old man with past medical history of CAD  (s/p CABG), Bioprosthetic MVR, ANAHI closure with AtriClip in 2021, Cardiomyopathy (EF 45-50% ) , Atrial fibrillation  on Apixiban, End stage renal disease via Rt chest wall permacath  ( Lt AVF unmatured)  with prior hospitalization in March at [facility] with pacemaker extraction secondary to MSSA bacteremia followed by replacement with Micra PPM. Recent  hospitalization at [facility] in August for rectal bleeding now presents with fever and cough, found to have septic shock, requiring pressor support, currently admitted to ICU. Blood cultures 9/30  positive for MRSA,  Pt remains on IV Vanco by trough and low dose Levophed,     #ID:  -9/30 Bcx +MRSA,10/2  tunneled catheter tip +MRSA- pt on Vanco dosed daily by trough.   - Bcx drawn 10/4 remain + for GPCocci clusters  -WBC downtrending   - appreciate ID recommendations   -c/w Mucpirocin nasal bid  - given bacteremia and  bioprosthetic  valve, concern for endocarditis, however pt unable to tolerate ERENDIRA at this time 2/2 encephalopathy, thrombocytopenia  - remains in shock requiring pressors , midodrine to help wean pressors    #PULM:  - concern for aspiration given poor mental status, pt appears to be protecting airway and managing secretions.     #RENAL:  - h/o ESRD on HD via Rt [place] HD cath,  Lt AVF intact, however not matured for use. Bedside HD scheduled for Fri 10/6   - K 3.7, Phos 2.5,   - infected tunneled HD catheter removed  -c/w EPO  - Nephrology recommendations appreciated.     # CARDIO:  -pt requiring Levophed for MAP goal >60, however requirements are decreasing  - midodrine 10 mg Q 8   - Apixaban held for thrombocytopenia,   ASA QD    - Cardio consult and recommendations appreciated  - will obtain ERENDIRA with pt able to protect airway and tolerate    # GI:   -NGT placed today for facilitation of PO meds and feeds, starting Nephro   -c/e folic acid, mesalamine  -Protonix for GI prophylaxis  - c/w hydrocortisone suppository prn hemorrhoid pain     # ENDO:  -c/w synthroid    # HEME:  - thrombocytopenia improving, pts 72 today   -appreciate heme recommendations   - EPO weekly   - venodynes for DVT ppx    # GOC/ETHICS:  -palliative following, HCP listed in chart as partner Surekha Issa. She was at bedside today and updated on status.   -remains full code.                Significant recent/past 24 hr events:  Levophed requirements decreasing,     Subjective:  not eating much    Review of Systems:   Unable to obtain due to altered mental status,, encephalopathy       HPI:   Patient is a 78y old  Male who presents with a chief complaint of Sepsis 2/2 bacteremia.  (05 Oct 2023 08:22)    PAST MEDICAL & SURGICAL HISTORY:  Chronic atrial fibrillation      History of BPH      CAD (coronary artery disease)      Coronary stent patent      ESRD on dialysis      History of GI bleed      Mitral valve replaced        FAMILY HISTORY:      Vitals   ICU Vital Signs Last 24 Hrs  T(C): 36.6 (05 Oct 2023 08:00), Max: 36.6 (04 Oct 2023 12:00)  T(F): 97.9 (05 Oct 2023 08:00), Max: 97.9 (04 Oct 2023 12:00)  HR: 101 (05 Oct 2023 10:30) (67 - 122)  BP: 108/68 (05 Oct 2023 10:30) (81/52 - 138/87)  BP(mean): 80 (05 Oct 2023 10:30) (57 - 105)  ABP: --  ABP(mean): --  RR: 16 (05 Oct 2023 10:30) (10 - 21)  SpO2: 99% (05 Oct 2023 10:30) (91% - 100%)    O2 Parameters below as of 05 Oct 2023 06:00  Patient On (Oxygen Delivery Method): nasal cannula  O2 Flow (L/min): 2          Physical Exam:   Constitutional: cacethic, weak, appears ill, confused and encephalopathic.    HEENT: normocephalic, atraumatic , conjunctiva normal B/L.  PERRLA, EOMI, no drainage or redness, no splenicterus neck supple,  No JVD, no thyromegaly, trachea is midline, oval cavity  pink with moist mucosa  Back: Normal spine flexure, No CVA tenderness, No deformity or limitation of movement  Respiratory: Breath Sounds equal & clear bilaterally to auscultation over all lung fields , no accessory muscle use noted, no wheezes, rales, rhonchi   Cardiovascular: irreg-irreg, rate controled @ , , normal S1, S2; no rubs, clicks, gallops murmurs,  no JVD, no peripheral edema  Abdomen:  Soft, non-tender, non distended, no hepatosplenomegaly, p+ bowel sounds in all 4 quadrants, , no masses felt  Extremities:  B/L  peripheral edema, no cyanosis, no clubbing   Vascular: Equal and normal pulses: 2+ peripheral pulses throughout  Musculoskeletal: No joint swelling or deformity; pt id bedbound with  limitation of movement at this time   Neurological:alert at times, not oriented to place, time or situation CN 2-12 grossly intact, follows basic commands  Skin: dry, thin ecchymotic, edematous 2/2 critical illness    VENT SETTINGS         I&O's Detail    04 Oct 2023 07:01  -  05 Oct 2023 07:00  --------------------------------------------------------  IN:    IV PiggyBack: 250 mL    Norepinephrine: 119 mL    Oral Fluid: 50 mL    Other (mL): 800 mL  Total IN: 1219 mL    OUT:    Other (mL): 3300 mL  Total OUT: 3300 mL    Total NET: -2081 mL      05 Oct 2023 07:01  -  05 Oct 2023 10:54  --------------------------------------------------------  IN:    Norepinephrine: 19 mL  Total IN: 19 mL    OUT:  Total OUT: 0 mL    Total NET: 19 mL          LABS                        8.3    13.26 )-----------( 75       ( 05 Oct 2023 05:30 )             23.7     10-05    134<L>  |  100  |  66<H>  ----------------------------<  110<H>  3.7   |  25  |  4.43<H>    Ca    9.5      05 Oct 2023 05:30  Phos  2.5     10-05  Mg     1.9     10-05    TPro  5.9<L>  /  Alb  2.5<L>  /  TBili  0.7  /  DBili  x   /  AST  86<H>  /  ALT  79<H>  /  AlkPhos  397<H>  10-03    LIVER FUNCTIONS - ( 03 Oct 2023 15:51 )  Alb: 2.5 g/dL / Pro: 5.9 g/dL / ALK PHOS: 397 U/L / ALT: 79 U/L / AST: 86 U/L / GGT: x                   Urinalysis Basic - ( 05 Oct 2023 05:30 )    Color: x / Appearance: x / SG: x / pH: x  Gluc: 110 mg/dL / Ketone: x  / Bili: x / Urobili: x   Blood: x / Protein: x / Nitrite: x   Leuk Esterase: x / RBC: x / WBC x   Sq Epi: x / Non Sq Epi: x / Bacteria: x            MEDICATIONS  (STANDING):  aspirin  chewable 81 milliGRAM(s) Oral daily  epoetin val (PROCRIT) Injectable 92780 Unit(s) IV Push <User Schedule>  folic acid 1 milliGRAM(s) Oral daily  hydrocortisone hemorrhoidal Suppository 1 Suppository(s) Rectal two times a day  influenza  Vaccine (HIGH DOSE) 0.7 milliLiter(s) IntraMuscular once  levothyroxine 137 MICROGram(s) Oral daily  mesalamine Suppository 1000 milliGRAM(s) Rectal at bedtime  midodrine 10 milliGRAM(s) Oral every 8 hours  mupirocin 2% Nasal 1 Application(s) Both Nostrils two times a day  norepinephrine Infusion 0.05 MICROgram(s)/kG/Min (6.81 mL/Hr) IV Continuous <Continuous>  pantoprazole    Tablet 40 milliGRAM(s) Oral before breakfast  polyethylene glycol 3350 17 Gram(s) Oral daily  senna 2 Tablet(s) Oral at bedtime    MEDICATIONS  (PRN):  acetaminophen     Tablet .. 650 milliGRAM(s) Oral every 6 hours PRN Mild Pain (1 - 3)  aluminum hydroxide/magnesium hydroxide/simethicone Suspension 10 milliLiter(s) Oral every 6 hours PRN dyspepsia      Allergies:  levofloxacin (Unknown)  alfuzosin (Unknown)  tamsulosin (Unknown)  Myrbetriq (Unknown)      Pt is 78 year old man with past medical history of CAD  (s/p CABG), Bioprosthetic MVR, ANAHI closure with AtriClip in 2021, Cardiomyopathy (EF 45-50% ) , Atrial fibrillation  on Apixiban, End stage renal disease via Rt chest wall permacath  ( Lt AVF unmatured)  with prior hospitalization in March at [facility] with pacemaker extraction secondary to MSSA bacteremia followed by replacement with Micra PPM. Recent  hospitalization at [facility] in August for rectal bleeding now presents with fever and cough, found to have septic shock, requiring pressor support, currently admitted to ICU. Blood cultures 9/30  positive for MRSA,  Pt remains on IV Vanco by trough and low dose Levophed,     #ID:  -9/30 Bcx +MRSA,10/2  tunneled catheter tip +MRSA- pt on Vanco dosed daily by trough.   - Bcx drawn 10/4 remain + for GPCocci clusters  -WBC downtrending   - appreciate ID recommendations   -c/w Mucpirocin nasal bid  - given bacteremia and  bioprosthetic  valve, concern for endocarditis, however pt unable to tolerate ERENDIRA at this time 2/2 encephalopathy, thrombocytopenia  - remains in shock requiring pressors , midodrine to help wean pressors    #PULM:  - concern for aspiration given poor mental status, pt appears to be protecting airway and managing secretions.     #RENAL:  - h/o ESRD on HD via Rt [place] HD cath,  Lt AVF intact, however not matured for use. Bedside HD scheduled for Fri 10/6   - K 3.7, Phos 2.5,   - infected tunneled HD catheter removed  -c/w EPO  - Nephrology recommendations appreciated.     # CARDIO:  -pt requiring Levophed for MAP goal >60, however requirements are decreasing  - midodrine 10 mg Q 8   - Apixaban held for thrombocytopenia,   ASA QD    - Cardio consult and recommendations appreciated  - will obtain ERENDIRA with pt able to protect airway and tolerate    # GI:   -NGT placed today for facilitation of PO meds and feeds, starting Nephro   -c/e folic acid, mesalamine  -Protonix for GI prophylaxis  - c/w hydrocortisone suppository prn hemorrhoid pain     # ENDO:  -c/w synthroid    # HEME:  - thrombocytopenia improving, pts 72 today   -appreciate heme recommendations   - EPO weekly   - venodynes for DVT ppx    # GOC/ETHICS:  -palliative following, HCP listed in chart as partner Surekha Issa. She was at bedside today and updated on status.   -remains full code.                Significant recent/past 24 hr events:  Levophed requirements decreasing,     Subjective:  not eating much    Review of Systems:   Unable to obtain due to altered mental status,, encephalopathy       HPI:   Patient is a 78y old  Male who presents with a chief complaint of Sepsis 2/2 bacteremia.  (05 Oct 2023 08:22)    PAST MEDICAL & SURGICAL HISTORY:  Chronic atrial fibrillation      History of BPH      CAD (coronary artery disease)      Coronary stent patent      ESRD on dialysis      History of GI bleed      Mitral valve replaced        FAMILY HISTORY:      Vitals   ICU Vital Signs Last 24 Hrs  T(C): 36.6 (05 Oct 2023 08:00), Max: 36.6 (04 Oct 2023 12:00)  T(F): 97.9 (05 Oct 2023 08:00), Max: 97.9 (04 Oct 2023 12:00)  HR: 101 (05 Oct 2023 10:30) (67 - 122)  BP: 108/68 (05 Oct 2023 10:30) (81/52 - 138/87)  BP(mean): 80 (05 Oct 2023 10:30) (57 - 105)  ABP: --  ABP(mean): --  RR: 16 (05 Oct 2023 10:30) (10 - 21)  SpO2: 99% (05 Oct 2023 10:30) (91% - 100%)    O2 Parameters below as of 05 Oct 2023 06:00  Patient On (Oxygen Delivery Method): nasal cannula  O2 Flow (L/min): 2          Physical Exam:   Constitutional: cacethic, weak, appears ill, confused and encephalopathic.    HEENT: normocephalic, atraumatic , conjunctiva normal B/L.  PERRLA, EOMI, no drainage or redness, no splenicterus neck supple,  No JVD, no thyromegaly, trachea is midline, oval cavity  pink with moist mucosa  Back: Normal spine flexure, No CVA tenderness, No deformity or limitation of movement  Respiratory: Breath Sounds equal & clear bilaterally to auscultation over all lung fields , no accessory muscle use noted, no wheezes, rales, rhonchi   Cardiovascular: irreg-irreg, rate controled @ , , normal S1, S2; no rubs, clicks, gallops murmurs,  no JVD, no peripheral edema  Abdomen:  Soft, non-tender, non distended, no hepatosplenomegaly, p+ bowel sounds in all 4 quadrants, , no masses felt  Extremities:  B/L  peripheral edema, no cyanosis, no clubbing   Vascular: Equal and normal pulses: 2+ peripheral pulses throughout  Musculoskeletal: No joint swelling or deformity; pt id bedbound with  limitation of movement at this time   Neurological:alert at times, not oriented to place, time or situation CN 2-12 grossly intact, follows basic commands  Skin: dry, thin ecchymotic, edematous 2/2 critical illness    VENT SETTINGS         I&O's Detail    04 Oct 2023 07:01  -  05 Oct 2023 07:00  --------------------------------------------------------  IN:    IV PiggyBack: 250 mL    Norepinephrine: 119 mL    Oral Fluid: 50 mL    Other (mL): 800 mL  Total IN: 1219 mL    OUT:    Other (mL): 3300 mL  Total OUT: 3300 mL    Total NET: -2081 mL      05 Oct 2023 07:01  -  05 Oct 2023 10:54  --------------------------------------------------------  IN:    Norepinephrine: 19 mL  Total IN: 19 mL    OUT:  Total OUT: 0 mL    Total NET: 19 mL          LABS                        8.3    13.26 )-----------( 75       ( 05 Oct 2023 05:30 )             23.7     10-05    134<L>  |  100  |  66<H>  ----------------------------<  110<H>  3.7   |  25  |  4.43<H>    Ca    9.5      05 Oct 2023 05:30  Phos  2.5     10-05  Mg     1.9     10-05    TPro  5.9<L>  /  Alb  2.5<L>  /  TBili  0.7  /  DBili  x   /  AST  86<H>  /  ALT  79<H>  /  AlkPhos  397<H>  10-03    LIVER FUNCTIONS - ( 03 Oct 2023 15:51 )  Alb: 2.5 g/dL / Pro: 5.9 g/dL / ALK PHOS: 397 U/L / ALT: 79 U/L / AST: 86 U/L / GGT: x                   Urinalysis Basic - ( 05 Oct 2023 05:30 )    Color: x / Appearance: x / SG: x / pH: x  Gluc: 110 mg/dL / Ketone: x  / Bili: x / Urobili: x   Blood: x / Protein: x / Nitrite: x   Leuk Esterase: x / RBC: x / WBC x   Sq Epi: x / Non Sq Epi: x / Bacteria: x            MEDICATIONS  (STANDING):  aspirin  chewable 81 milliGRAM(s) Oral daily  epoetin val (PROCRIT) Injectable 93108 Unit(s) IV Push <User Schedule>  folic acid 1 milliGRAM(s) Oral daily  hydrocortisone hemorrhoidal Suppository 1 Suppository(s) Rectal two times a day  influenza  Vaccine (HIGH DOSE) 0.7 milliLiter(s) IntraMuscular once  levothyroxine 137 MICROGram(s) Oral daily  mesalamine Suppository 1000 milliGRAM(s) Rectal at bedtime  midodrine 10 milliGRAM(s) Oral every 8 hours  mupirocin 2% Nasal 1 Application(s) Both Nostrils two times a day  norepinephrine Infusion 0.05 MICROgram(s)/kG/Min (6.81 mL/Hr) IV Continuous <Continuous>  pantoprazole    Tablet 40 milliGRAM(s) Oral before breakfast  polyethylene glycol 3350 17 Gram(s) Oral daily  senna 2 Tablet(s) Oral at bedtime    MEDICATIONS  (PRN):  acetaminophen     Tablet .. 650 milliGRAM(s) Oral every 6 hours PRN Mild Pain (1 - 3)  aluminum hydroxide/magnesium hydroxide/simethicone Suspension 10 milliLiter(s) Oral every 6 hours PRN dyspepsia      Allergies:  levofloxacin (Unknown)  alfuzosin (Unknown)  tamsulosin (Unknown)  Myrbetriq (Unknown)      Pt is 78 year old man with past medical history of CAD  (s/p CABG), Bioprosthetic MVR, ANAHI closure with AtriClip in 2021, Cardiomyopathy (EF 45-50% ) , Atrial fibrillation  on Apixiban, End stage renal disease via Rt chest wall permacath  ( Lt AVF unmatured)  with prior hospitalization in March at [facility] with pacemaker extraction secondary to MSSA bacteremia followed by replacement with Micra PPM. Recent  hospitalization at [facility] in August for rectal bleeding now presents with fever and cough, found to have septic shock, requiring pressor support, currently admitted to ICU. Blood cultures 9/30  positive for MRSA,  Pt remains on IV Vanco by trough and low dose Levophed,     #ID:  -9/30 Bcx +MRSA,10/2  tunneled catheter tip +MRSA- pt on Vanco dosed daily by trough.   - Bcx drawn 10/4 remain + for GPCocci clusters  -WBC downtrending   - appreciate ID recommendations   -c/w Mucpirocin nasal bid  - given bacteremia and  bioprosthetic  valve, concern for endocarditis, however pt unable to tolerate ERENDIRA at this time 2/2 encephalopathy, thrombocytopenia  - remains in shock requiring pressors , midodrine to help wean pressors    #PULM:  - concern for aspiration given poor mental status, pt appears to be protecting airway and managing secretions.     #RENAL:  - h/o ESRD on HD via Rt [place] HD cath,  Lt AVF intact, however not matured for use. Bedside HD scheduled for Fri 10/6   - K 3.7, Phos 2.5,   - infected tunneled HD catheter removed  -c/w EPO  - Nephrology recommendations appreciated.     # CARDIO:  -pt requiring Levophed for MAP goal >60, however requirements are decreasing  - midodrine 10 mg Q 8   - Apixaban held for thrombocytopenia,   ASA QD    - Cardio consult and recommendations appreciated  - will obtain ERENDIRA with pt able to protect airway and tolerate    # GI:   -NGT placed today for facilitation of PO meds and feeds, starting Nephro   -c/e folic acid, mesalamine  -Protonix for GI prophylaxis  - c/w hydrocortisone suppository prn hemorrhoid pain     # ENDO:  -c/w synthroid    # HEME:  - thrombocytopenia improving, pts 72 today   -appreciate heme recommendations   - EPO weekly   - venodynes for DVT ppx    # GOC/ETHICS:  -palliative following, HCP listed in chart as partner Surekha Issa. She was at bedside today and updated on status.   -remains full code.

## 2023-10-05 NOTE — PROGRESS NOTE ADULT - SUBJECTIVE AND OBJECTIVE BOX
Seen in ICU, on NC O2    Vital Signs Last 24 Hrs  T(C): 36.6 (10-05-23 @ 12:00), Max: 36.6 (10-04-23 @ 23:00)  T(F): 97.8 (10-05-23 @ 12:00), Max: 97.9 (10-04-23 @ 23:00)  HR: 86 (10-05-23 @ 17:00) (67 - 122)  BP: 104/61 (10-05-23 @ 17:00) (82/45 - 138/87)  BP(mean): 76 (10-05-23 @ 17:00) (57 - 104)  RR: 13 (10-05-23 @ 17:00) (10 - 21)  SpO2: 97% (10-05-23 @ 17:00) (91% - 100%)    I&O's Detail    04 Oct 2023 07:01  -  05 Oct 2023 07:00  --------------------------------------------------------  IN:    IV PiggyBack: 250 mL    Norepinephrine: 119 mL    Oral Fluid: 50 mL    Other (mL): 800 mL  Total IN: 1219 mL    OUT:    Other (mL): 3300 mL  Total OUT: 3300 mL    05 Oct 2023 07:01  -  05 Oct 2023 17:25  --------------------------------------------------------  IN:    Nepro: 190 mL    Norepinephrine: 39.2 mL  Total IN: 229.2 mL    OUT:  Total OUT: 0 mL    s1s2  b/l air entry  soft, ND  sm edema, LUE AVF + bruit                                                  8.3    13.26 )-----------( 75       ( 05 Oct 2023 05:30 )             23.7     05 Oct 2023 05:30    134    |  100    |  66     ----------------------------<  110    3.7     |  25     |  4.43     Ca    9.5        05 Oct 2023 05:30  Phos  2.5       05 Oct 2023 05:30  Mg     1.9       05 Oct 2023 05:30    Culture - Blood (collected 04 Oct 2023 06:00)  Source: .Blood Blood  Gram Stain (05 Oct 2023 07:40):    Growth in aerobic bottle: Gram Positive Cocci in Clusters    Growth in anaerobic bottle: Gram Positive Cocci in Clusters  Preliminary Report (05 Oct 2023 07:40):    Growth in aerobic bottle: Gram Positive Cocci in Clusters    Growth in anaerobic bottle: Gram Positive Cocci in Clusters    Culture - Blood (collected 04 Oct 2023 06:00)  Source: .Blood Blood  Gram Stain (05 Oct 2023 08:18):    Growth in aerobic bottle: Gram Positive Cocci in Clusters    Growth in anaerobic bottle: Gram Positive Cocci in Clusters  Preliminary Report (05 Oct 2023 08:19):    Growth in aerobic bottle: Gram Positive Cocci in Clusters    Growth in anaerobic bottle: Gram Positive Cocci in Clusters    acetaminophen     Tablet .. 650 milliGRAM(s) Oral every 6 hours PRN  aluminum hydroxide/magnesium hydroxide/simethicone Suspension 10 milliLiter(s) Oral every 6 hours PRN  aspirin  chewable 81 milliGRAM(s) Oral daily  epoetin val (PROCRIT) Injectable 16129 Unit(s) IV Push <User Schedule>  folic acid 1 milliGRAM(s) Oral daily  hydrocortisone hemorrhoidal Suppository 1 Suppository(s) Rectal two times a day  influenza  Vaccine (HIGH DOSE) 0.7 milliLiter(s) IntraMuscular once  levothyroxine 137 MICROGram(s) Oral daily  mesalamine Suppository 1000 milliGRAM(s) Rectal at bedtime  midodrine 10 milliGRAM(s) Oral every 8 hours  mupirocin 2% Nasal 1 Application(s) Both Nostrils two times a day  norepinephrine Infusion 0.05 MICROgram(s)/kG/Min IV Continuous <Continuous>  pantoprazole    Tablet 40 milliGRAM(s) Oral before breakfast  polyethylene glycol 3350 17 Gram(s) Oral daily  senna 2 Tablet(s) Oral at bedtime    A/P:    ESRD on HD MWF via perm cath, Afib, CM, EF 45 - 50%  Adm 9/30/23 w/MRSA bacteremia, persistent   Pls dose Vanco by levels  Perm cath d/c-d 10/2/23  Cath cx pos for MRSA  BP support per ICU   HD MWF via temp IJ cath (placed 10/4/23), fluid removal as tolerates  ID f/u appr , Abx per ID    [phone] Seen in ICU, on [place] O2    Vital Signs Last 24 Hrs  T(C): 36.6 (10-05-23 @ 12:00), Max: 36.6 (10-04-23 @ 23:00)  T(F): 97.8 (10-05-23 @ 12:00), Max: 97.9 (10-04-23 @ 23:00)  HR: 86 (10-05-23 @ 17:00) (67 - 122)  BP: 104/61 (10-05-23 @ 17:00) (82/45 - 138/87)  BP(mean): 76 (10-05-23 @ 17:00) (57 - 104)  RR: 13 (10-05-23 @ 17:00) (10 - 21)  SpO2: 97% (10-05-23 @ 17:00) (91% - 100%)    I&O's Detail    04 Oct 2023 07:01  -  05 Oct 2023 07:00  --------------------------------------------------------  IN:    IV PiggyBack: 250 mL    Norepinephrine: 119 mL    Oral Fluid: 50 mL    Other (mL): 800 mL  Total IN: 1219 mL    OUT:    Other (mL): 3300 mL  Total OUT: 3300 mL    05 Oct 2023 07:01  -  05 Oct 2023 17:25  --------------------------------------------------------  IN:    Nepro: 190 mL    Norepinephrine: 39.2 mL  Total IN: 229.2 mL    OUT:  Total OUT: 0 mL    s1s2  b/l air entry  soft, ND  sm edema, LUE AVF + bruit                                                  8.3    13.26 )-----------( 75       ( 05 Oct 2023 05:30 )             23.7     05 Oct 2023 05:30    134    |  100    |  66     ----------------------------<  110    3.7     |  25     |  4.43     Ca    9.5        05 Oct 2023 05:30  Phos  2.5       05 Oct 2023 05:30  Mg     1.9       05 Oct 2023 05:30    Culture - Blood (collected 04 Oct 2023 06:00)  Source: .Blood Blood  Gram Stain (05 Oct 2023 07:40):    Growth in aerobic bottle: Gram Positive Cocci in Clusters    Growth in anaerobic bottle: Gram Positive Cocci in Clusters  Preliminary Report (05 Oct 2023 07:40):    Growth in aerobic bottle: Gram Positive Cocci in Clusters    Growth in anaerobic bottle: Gram Positive Cocci in Clusters    Culture - Blood (collected 04 Oct 2023 06:00)  Source: .Blood Blood  Gram Stain (05 Oct 2023 08:18):    Growth in aerobic bottle: Gram Positive Cocci in Clusters    Growth in anaerobic bottle: Gram Positive Cocci in Clusters  Preliminary Report (05 Oct 2023 08:19):    Growth in aerobic bottle: Gram Positive Cocci in Clusters    Growth in anaerobic bottle: Gram Positive Cocci in Clusters    acetaminophen     Tablet .. 650 milliGRAM(s) Oral every 6 hours PRN  aluminum hydroxide/magnesium hydroxide/simethicone Suspension 10 milliLiter(s) Oral every 6 hours PRN  aspirin  chewable 81 milliGRAM(s) Oral daily  epoetin val (PROCRIT) Injectable 60374 Unit(s) IV Push <User Schedule>  folic acid 1 milliGRAM(s) Oral daily  hydrocortisone hemorrhoidal Suppository 1 Suppository(s) Rectal two times a day  influenza  Vaccine (HIGH DOSE) 0.7 milliLiter(s) IntraMuscular once  levothyroxine 137 MICROGram(s) Oral daily  mesalamine Suppository 1000 milliGRAM(s) Rectal at bedtime  midodrine 10 milliGRAM(s) Oral every 8 hours  mupirocin 2% Nasal 1 Application(s) Both Nostrils two times a day  norepinephrine Infusion 0.05 MICROgram(s)/kG/Min IV Continuous <Continuous>  pantoprazole    Tablet 40 milliGRAM(s) Oral before breakfast  polyethylene glycol 3350 17 Gram(s) Oral daily  senna 2 Tablet(s) Oral at bedtime    A/P:    ESRD on HD MWF via perm cath, Afib, CM, EF 45 - 50%  Adm 9/30/23 w/MRSA bacteremia, persistent   Pls dose Vanco by levels  Perm cath d/c-d 10/2/23  Cath cx pos for MRSA  BP support per ICU   HD MWF via temp IJ cath (placed 10/4/23), fluid removal as tolerates  ID f/u appr , Abx per ID    [phone] Seen in ICU, on [place] O2    Vital Signs Last 24 Hrs  T(C): 36.6 (10-05-23 @ 12:00), Max: 36.6 (10-04-23 @ 23:00)  T(F): 97.8 (10-05-23 @ 12:00), Max: 97.9 (10-04-23 @ 23:00)  HR: 86 (10-05-23 @ 17:00) (67 - 122)  BP: 104/61 (10-05-23 @ 17:00) (82/45 - 138/87)  BP(mean): 76 (10-05-23 @ 17:00) (57 - 104)  RR: 13 (10-05-23 @ 17:00) (10 - 21)  SpO2: 97% (10-05-23 @ 17:00) (91% - 100%)    I&O's Detail    04 Oct 2023 07:01  -  05 Oct 2023 07:00  --------------------------------------------------------  IN:    IV PiggyBack: 250 mL    Norepinephrine: 119 mL    Oral Fluid: 50 mL    Other (mL): 800 mL  Total IN: 1219 mL    OUT:    Other (mL): 3300 mL  Total OUT: 3300 mL    05 Oct 2023 07:01  -  05 Oct 2023 17:25  --------------------------------------------------------  IN:    Nepro: 190 mL    Norepinephrine: 39.2 mL  Total IN: 229.2 mL    OUT:  Total OUT: 0 mL    s1s2  b/l air entry  soft, ND  sm edema, LUE AVF + bruit                                                  8.3    13.26 )-----------( 75       ( 05 Oct 2023 05:30 )             23.7     05 Oct 2023 05:30    134    |  100    |  66     ----------------------------<  110    3.7     |  25     |  4.43     Ca    9.5        05 Oct 2023 05:30  Phos  2.5       05 Oct 2023 05:30  Mg     1.9       05 Oct 2023 05:30    Culture - Blood (collected 04 Oct 2023 06:00)  Source: .Blood Blood  Gram Stain (05 Oct 2023 07:40):    Growth in aerobic bottle: Gram Positive Cocci in Clusters    Growth in anaerobic bottle: Gram Positive Cocci in Clusters  Preliminary Report (05 Oct 2023 07:40):    Growth in aerobic bottle: Gram Positive Cocci in Clusters    Growth in anaerobic bottle: Gram Positive Cocci in Clusters    Culture - Blood (collected 04 Oct 2023 06:00)  Source: .Blood Blood  Gram Stain (05 Oct 2023 08:18):    Growth in aerobic bottle: Gram Positive Cocci in Clusters    Growth in anaerobic bottle: Gram Positive Cocci in Clusters  Preliminary Report (05 Oct 2023 08:19):    Growth in aerobic bottle: Gram Positive Cocci in Clusters    Growth in anaerobic bottle: Gram Positive Cocci in Clusters    acetaminophen     Tablet .. 650 milliGRAM(s) Oral every 6 hours PRN  aluminum hydroxide/magnesium hydroxide/simethicone Suspension 10 milliLiter(s) Oral every 6 hours PRN  aspirin  chewable 81 milliGRAM(s) Oral daily  epoetin val (PROCRIT) Injectable 92874 Unit(s) IV Push <User Schedule>  folic acid 1 milliGRAM(s) Oral daily  hydrocortisone hemorrhoidal Suppository 1 Suppository(s) Rectal two times a day  influenza  Vaccine (HIGH DOSE) 0.7 milliLiter(s) IntraMuscular once  levothyroxine 137 MICROGram(s) Oral daily  mesalamine Suppository 1000 milliGRAM(s) Rectal at bedtime  midodrine 10 milliGRAM(s) Oral every 8 hours  mupirocin 2% Nasal 1 Application(s) Both Nostrils two times a day  norepinephrine Infusion 0.05 MICROgram(s)/kG/Min IV Continuous <Continuous>  pantoprazole    Tablet 40 milliGRAM(s) Oral before breakfast  polyethylene glycol 3350 17 Gram(s) Oral daily  senna 2 Tablet(s) Oral at bedtime    A/P:    ESRD on HD MWF via perm cath, Afib, CM, EF 45 - 50%  Adm 9/30/23 w/MRSA bacteremia, persistent   Pls dose Vanco by levels  Perm cath d/c-d 10/2/23  Cath cx pos for MRSA  BP support per ICU   HD MWF via temp IJ cath (placed 10/4/23), fluid removal as tolerates  ID f/u appr , Abx per ID    [phone]

## 2023-10-05 NOTE — PROGRESS NOTE ADULT - SUBJECTIVE AND OBJECTIVE BOX
JANEE ARCEO   78y   Male    Admitting: ZENA Davidson  HPI:  79 yo M pmhx ESRD came to ED complaining of fatigue. Patient noted to be febrile to 102.2 in ED with complains of cough and wheezing. Received 1l NS bolus and noted to be hypotensive to 80's systolic. Complained of some abdominal pain but unsure why he is in the hospital. Poor historian and unable to fully participate in ROS     PAST MEDICAL & SURGICAL HISTORY:  Chronic atrial fibrillation      History of BPH      CAD (coronary artery disease)      Coronary stent patent      ESRD on dialysis      History of GI bleed      Mitral valve replaced        HEALTH ISSUES - PROBLEM Dx:  Thrombocytopenia    Chronic atrial fibrillation    History of BPH    ESRD on dialysis    CAD (coronary artery disease)    Coronary stent patent    Mitral valve replaced      MEDICATIONS  (STANDING):  aspirin  chewable 81 milliGRAM(s) Oral daily  epoetin val (PROCRIT) Injectable 73407 Unit(s) IV Push <User Schedule>  folic acid 1 milliGRAM(s) Oral daily  hydrocortisone hemorrhoidal Suppository 1 Suppository(s) Rectal two times a day  influenza  Vaccine (HIGH DOSE) 0.7 milliLiter(s) IntraMuscular once  levothyroxine 137 MICROGram(s) Oral daily  mesalamine Suppository 1000 milliGRAM(s) Rectal at bedtime  midodrine 10 milliGRAM(s) Oral every 8 hours  mupirocin 2% Nasal 1 Application(s) Both Nostrils two times a day  norepinephrine Infusion 0.05 MICROgram(s)/kG/Min (6.81 mL/Hr) IV Continuous <Continuous>  pantoprazole    Tablet 40 milliGRAM(s) Oral before breakfast  polyethylene glycol 3350 17 Gram(s) Oral daily  senna 2 Tablet(s) Oral at bedtime    MEDICATIONS  (PRN):  acetaminophen     Tablet .. 650 milliGRAM(s) Oral every 6 hours PRN Mild Pain (1 - 3)  aluminum hydroxide/magnesium hydroxide/simethicone Suspension 10 milliLiter(s) Oral every 6 hours PRN dyspepsia    Allergies    levofloxacin (Unknown)  alfuzosin (Unknown)  tamsulosin (Unknown)  Myrbetriq (Unknown)    INTERVAL HPI/OVERNIGHT EVENTS:  Patient S&E at bedside.      VITAL SIGNS:  T(F): 97.9 (10-05-23 @ 03:00)  HR: 87 (10-05-23 @ 08:00)  BP: 89/51 (10-05-23 @ 08:00)  RR: 15 (10-05-23 @ 08:00)  SpO2: 99% (10-05-23 @ 08:00)      PHYSICAL EXAM:  Constitutional: NAD  Eyes: sclera non-icteric  Neck: no JVD  Respiratory: decreased BS, scattered crackles ant.  Cardiovascular: RRR, no M/R/G  Gastrointestinal: soft, NTND, no masses palpable  Extremities: stockings in place      Labs:             8.3    13.26 )-----------( 75       ( 10-05 @ 05:30 )             23.7                9.0    15.72 )-----------( 62       ( 10-04 @ 15:25 )             26.6                8.7    12.80 )-----------( 62       ( 10-04 @ 06:00 )             25.9                8.2    9.11  )-----------( 61       ( 10-03 @ 06:32 )             24.2                x      x     )-----------( x        ( 10-03 @ 05:00 )             24.2       10-05    134<L>  |  100  |  66<H>  ----------------------------<  110<H>  3.7   |  25  |  4.43<H>    Ca    9.5      05 Oct 2023 05:30  Phos  2.5     10-05  Mg     1.9     10-05    TPro  5.9<L>  /  Alb  2.5<L>  /  TBili  0.7  /  DBili  x   /  AST  86<H>  /  ALT  79<H>  /  AlkPhos  397<H>  10-03      Vitamin B12, Serum: 736 pg/mL (10-03-23 @ 06:32)  Absolute Reticulocytes: 24.6 K/uL (10-03-23 @ 06:32)  Iron Total: 19 ug/dL (10-02-23 @ 10:00)  Iron - Total Binding Capacity.: 107 ug/dL (10-02-23 @ 10:00)  % Saturation, Iron: 18 % (10-02-23 @ 10:00)  Ferritin: 1029 ng/mL (10-02-23 @ 10:00)  Absolute Reticulocytes: 34.2 K/uL (10-02-23 @ 10:00)  Vitamin B12, Serum: 636 pg/mL (10-02-23 @ 10:00)  Folate, Serum: >20.0 ng/mL (10-02-23 @ 10:00)      Culture - Blood (collected 04 Oct 2023 06:00)  Source: .Blood Blood  Gram Stain (05 Oct 2023 07:40):    Growth in aerobic bottle: Gram Positive Cocci in Clusters    Growth in anaerobic bottle: Gram Positive Cocci in Clusters  Preliminary Report (05 Oct 2023 07:40):    Growth in aerobic bottle: Gram Positive Cocci in Clusters    Growth in anaerobic bottle: Gram Positive Cocci in Clusters    Culture - Blood (collected 04 Oct 2023 06:00)  Source: .Blood Blood  Gram Stain (05 Oct 2023 08:18):    Growth in aerobic bottle: Gram Positive Cocci in Clusters    Growth in anaerobic bottle: Gram Positive Cocci in Clusters  Preliminary Report (05 Oct 2023 08:19):    Growth in aerobic bottle: Gram Positive Cocci in Clusters    Growth in anaerobic bottle: Gram Positive Cocci in Clusters    Culture - Catheter (collected 02 Oct 2023 16:21)  Source: .Catheter + MRSA Bacterimia Permacath Removal Aerobic/Anaerobic Right  IJ Permacath TIP  Preliminary Report (04 Oct 2023 18:21):    Greater than or equal to 15 Colonies Staphylococcus aureus        RADIOLOGY & ADDITIONAL TESTS:  < from: Xray Chest 1 View- PORTABLE-Urgent (Xray Chest 1 View- PORTABLE-Urgent .) (10.04.23 @ 12:52) >    IMPRESSION: Increasing CHF. Exchange of right jugular lines.    < end of copied text >    < from: CT Head No Cont (10.04.23 @ 11:00) >  IMPRESSION:  No CT evidence of acute intracranial pathology.    < end of copied text >    Consultant notes reviewed : YES [ x] ; NO [ ]   JANEE ARCEO   78y   Male    Admitting: ZENA Davidson  HPI:  79 yo M pmhx ESRD came to ED complaining of fatigue. Patient noted to be febrile to 102.2 in ED with complains of cough and wheezing. Received 1l NS bolus and noted to be hypotensive to 80's systolic. Complained of some abdominal pain but unsure why he is in the hospital. Poor historian and unable to fully participate in ROS     PAST MEDICAL & SURGICAL HISTORY:  Chronic atrial fibrillation      History of BPH      CAD (coronary artery disease)      Coronary stent patent      ESRD on dialysis      History of GI bleed      Mitral valve replaced        HEALTH ISSUES - PROBLEM Dx:  Thrombocytopenia    Chronic atrial fibrillation    History of BPH    ESRD on dialysis    CAD (coronary artery disease)    Coronary stent patent    Mitral valve replaced      MEDICATIONS  (STANDING):  aspirin  chewable 81 milliGRAM(s) Oral daily  epoetin val (PROCRIT) Injectable 85816 Unit(s) IV Push <User Schedule>  folic acid 1 milliGRAM(s) Oral daily  hydrocortisone hemorrhoidal Suppository 1 Suppository(s) Rectal two times a day  influenza  Vaccine (HIGH DOSE) 0.7 milliLiter(s) IntraMuscular once  levothyroxine 137 MICROGram(s) Oral daily  mesalamine Suppository 1000 milliGRAM(s) Rectal at bedtime  midodrine 10 milliGRAM(s) Oral every 8 hours  mupirocin 2% Nasal 1 Application(s) Both Nostrils two times a day  norepinephrine Infusion 0.05 MICROgram(s)/kG/Min (6.81 mL/Hr) IV Continuous <Continuous>  pantoprazole    Tablet 40 milliGRAM(s) Oral before breakfast  polyethylene glycol 3350 17 Gram(s) Oral daily  senna 2 Tablet(s) Oral at bedtime    MEDICATIONS  (PRN):  acetaminophen     Tablet .. 650 milliGRAM(s) Oral every 6 hours PRN Mild Pain (1 - 3)  aluminum hydroxide/magnesium hydroxide/simethicone Suspension 10 milliLiter(s) Oral every 6 hours PRN dyspepsia    Allergies    levofloxacin (Unknown)  alfuzosin (Unknown)  tamsulosin (Unknown)  Myrbetriq (Unknown)    INTERVAL HPI/OVERNIGHT EVENTS:  Patient S&E at bedside.      VITAL SIGNS:  T(F): 97.9 (10-05-23 @ 03:00)  HR: 87 (10-05-23 @ 08:00)  BP: 89/51 (10-05-23 @ 08:00)  RR: 15 (10-05-23 @ 08:00)  SpO2: 99% (10-05-23 @ 08:00)      PHYSICAL EXAM:  Constitutional: NAD  Eyes: sclera non-icteric  Neck: no JVD  Respiratory: decreased BS, scattered crackles ant.  Cardiovascular: RRR, no M/R/G  Gastrointestinal: soft, NTND, no masses palpable  Extremities: stockings in place      Labs:             8.3    13.26 )-----------( 75       ( 10-05 @ 05:30 )             23.7                9.0    15.72 )-----------( 62       ( 10-04 @ 15:25 )             26.6                8.7    12.80 )-----------( 62       ( 10-04 @ 06:00 )             25.9                8.2    9.11  )-----------( 61       ( 10-03 @ 06:32 )             24.2                x      x     )-----------( x        ( 10-03 @ 05:00 )             24.2       10-05    134<L>  |  100  |  66<H>  ----------------------------<  110<H>  3.7   |  25  |  4.43<H>    Ca    9.5      05 Oct 2023 05:30  Phos  2.5     10-05  Mg     1.9     10-05    TPro  5.9<L>  /  Alb  2.5<L>  /  TBili  0.7  /  DBili  x   /  AST  86<H>  /  ALT  79<H>  /  AlkPhos  397<H>  10-03      Vitamin B12, Serum: 736 pg/mL (10-03-23 @ 06:32)  Absolute Reticulocytes: 24.6 K/uL (10-03-23 @ 06:32)  Iron Total: 19 ug/dL (10-02-23 @ 10:00)  Iron - Total Binding Capacity.: 107 ug/dL (10-02-23 @ 10:00)  % Saturation, Iron: 18 % (10-02-23 @ 10:00)  Ferritin: 1029 ng/mL (10-02-23 @ 10:00)  Absolute Reticulocytes: 34.2 K/uL (10-02-23 @ 10:00)  Vitamin B12, Serum: 636 pg/mL (10-02-23 @ 10:00)  Folate, Serum: >20.0 ng/mL (10-02-23 @ 10:00)      Culture - Blood (collected 04 Oct 2023 06:00)  Source: .Blood Blood  Gram Stain (05 Oct 2023 07:40):    Growth in aerobic bottle: Gram Positive Cocci in Clusters    Growth in anaerobic bottle: Gram Positive Cocci in Clusters  Preliminary Report (05 Oct 2023 07:40):    Growth in aerobic bottle: Gram Positive Cocci in Clusters    Growth in anaerobic bottle: Gram Positive Cocci in Clusters    Culture - Blood (collected 04 Oct 2023 06:00)  Source: .Blood Blood  Gram Stain (05 Oct 2023 08:18):    Growth in aerobic bottle: Gram Positive Cocci in Clusters    Growth in anaerobic bottle: Gram Positive Cocci in Clusters  Preliminary Report (05 Oct 2023 08:19):    Growth in aerobic bottle: Gram Positive Cocci in Clusters    Growth in anaerobic bottle: Gram Positive Cocci in Clusters    Culture - Catheter (collected 02 Oct 2023 16:21)  Source: .Catheter + MRSA Bacterimia Permacath Removal Aerobic/Anaerobic Right  IJ Permacath TIP  Preliminary Report (04 Oct 2023 18:21):    Greater than or equal to 15 Colonies Staphylococcus aureus        RADIOLOGY & ADDITIONAL TESTS:  < from: Xray Chest 1 View- PORTABLE-Urgent (Xray Chest 1 View- PORTABLE-Urgent .) (10.04.23 @ 12:52) >    IMPRESSION: Increasing CHF. Exchange of right jugular lines.    < end of copied text >    < from: CT Head No Cont (10.04.23 @ 11:00) >  IMPRESSION:  No CT evidence of acute intracranial pathology.    < end of copied text >    Consultant notes reviewed : YES [ x] ; NO [ ]   JANEE ARCEO   78y   Male    Admitting: ZENA Davidson  HPI:  77 yo M pmhx ESRD came to ED complaining of fatigue. Patient noted to be febrile to 102.2 in ED with complains of cough and wheezing. Received 1l NS bolus and noted to be hypotensive to 80's systolic. Complained of some abdominal pain but unsure why he is in the hospital. Poor historian and unable to fully participate in ROS     PAST MEDICAL & SURGICAL HISTORY:  Chronic atrial fibrillation      History of BPH      CAD (coronary artery disease)      Coronary stent patent      ESRD on dialysis      History of GI bleed      Mitral valve replaced        HEALTH ISSUES - PROBLEM Dx:  Thrombocytopenia    Chronic atrial fibrillation    History of BPH    ESRD on dialysis    CAD (coronary artery disease)    Coronary stent patent    Mitral valve replaced      MEDICATIONS  (STANDING):  aspirin  chewable 81 milliGRAM(s) Oral daily  epoetin val (PROCRIT) Injectable 33616 Unit(s) IV Push <User Schedule>  folic acid 1 milliGRAM(s) Oral daily  hydrocortisone hemorrhoidal Suppository 1 Suppository(s) Rectal two times a day  influenza  Vaccine (HIGH DOSE) 0.7 milliLiter(s) IntraMuscular once  levothyroxine 137 MICROGram(s) Oral daily  mesalamine Suppository 1000 milliGRAM(s) Rectal at bedtime  midodrine 10 milliGRAM(s) Oral every 8 hours  mupirocin 2% Nasal 1 Application(s) Both Nostrils two times a day  norepinephrine Infusion 0.05 MICROgram(s)/kG/Min (6.81 mL/Hr) IV Continuous <Continuous>  pantoprazole    Tablet 40 milliGRAM(s) Oral before breakfast  polyethylene glycol 3350 17 Gram(s) Oral daily  senna 2 Tablet(s) Oral at bedtime    MEDICATIONS  (PRN):  acetaminophen     Tablet .. 650 milliGRAM(s) Oral every 6 hours PRN Mild Pain (1 - 3)  aluminum hydroxide/magnesium hydroxide/simethicone Suspension 10 milliLiter(s) Oral every 6 hours PRN dyspepsia    Allergies    levofloxacin (Unknown)  alfuzosin (Unknown)  tamsulosin (Unknown)  Myrbetriq (Unknown)    INTERVAL HPI/OVERNIGHT EVENTS:  Patient S&E at bedside.      VITAL SIGNS:  T(F): 97.9 (10-05-23 @ 03:00)  HR: 87 (10-05-23 @ 08:00)  BP: 89/51 (10-05-23 @ 08:00)  RR: 15 (10-05-23 @ 08:00)  SpO2: 99% (10-05-23 @ 08:00)      PHYSICAL EXAM:  Constitutional: NAD  Eyes: sclera non-icteric  Neck: no JVD  Respiratory: decreased BS, scattered crackles ant.  Cardiovascular: RRR, no M/R/G  Gastrointestinal: soft, NTND, no masses palpable  Extremities: stockings in place      Labs:             8.3    13.26 )-----------( 75       ( 10-05 @ 05:30 )             23.7                9.0    15.72 )-----------( 62       ( 10-04 @ 15:25 )             26.6                8.7    12.80 )-----------( 62       ( 10-04 @ 06:00 )             25.9                8.2    9.11  )-----------( 61       ( 10-03 @ 06:32 )             24.2                x      x     )-----------( x        ( 10-03 @ 05:00 )             24.2       10-05    134<L>  |  100  |  66<H>  ----------------------------<  110<H>  3.7   |  25  |  4.43<H>    Ca    9.5      05 Oct 2023 05:30  Phos  2.5     10-05  Mg     1.9     10-05    TPro  5.9<L>  /  Alb  2.5<L>  /  TBili  0.7  /  DBili  x   /  AST  86<H>  /  ALT  79<H>  /  AlkPhos  397<H>  10-03      Vitamin B12, Serum: 736 pg/mL (10-03-23 @ 06:32)  Absolute Reticulocytes: 24.6 K/uL (10-03-23 @ 06:32)  Iron Total: 19 ug/dL (10-02-23 @ 10:00)  Iron - Total Binding Capacity.: 107 ug/dL (10-02-23 @ 10:00)  % Saturation, Iron: 18 % (10-02-23 @ 10:00)  Ferritin: 1029 ng/mL (10-02-23 @ 10:00)  Absolute Reticulocytes: 34.2 K/uL (10-02-23 @ 10:00)  Vitamin B12, Serum: 636 pg/mL (10-02-23 @ 10:00)  Folate, Serum: >20.0 ng/mL (10-02-23 @ 10:00)      Culture - Blood (collected 04 Oct 2023 06:00)  Source: .Blood Blood  Gram Stain (05 Oct 2023 07:40):    Growth in aerobic bottle: Gram Positive Cocci in Clusters    Growth in anaerobic bottle: Gram Positive Cocci in Clusters  Preliminary Report (05 Oct 2023 07:40):    Growth in aerobic bottle: Gram Positive Cocci in Clusters    Growth in anaerobic bottle: Gram Positive Cocci in Clusters    Culture - Blood (collected 04 Oct 2023 06:00)  Source: .Blood Blood  Gram Stain (05 Oct 2023 08:18):    Growth in aerobic bottle: Gram Positive Cocci in Clusters    Growth in anaerobic bottle: Gram Positive Cocci in Clusters  Preliminary Report (05 Oct 2023 08:19):    Growth in aerobic bottle: Gram Positive Cocci in Clusters    Growth in anaerobic bottle: Gram Positive Cocci in Clusters    Culture - Catheter (collected 02 Oct 2023 16:21)  Source: .Catheter + MRSA Bacterimia Permacath Removal Aerobic/Anaerobic Right  IJ Permacath TIP  Preliminary Report (04 Oct 2023 18:21):    Greater than or equal to 15 Colonies Staphylococcus aureus        RADIOLOGY & ADDITIONAL TESTS:  < from: Xray Chest 1 View- PORTABLE-Urgent (Xray Chest 1 View- PORTABLE-Urgent .) (10.04.23 @ 12:52) >    IMPRESSION: Increasing CHF. Exchange of right jugular lines.    < end of copied text >    < from: CT Head No Cont (10.04.23 @ 11:00) >  IMPRESSION:  No CT evidence of acute intracranial pathology.    < end of copied text >    Consultant notes reviewed : YES [ x] ; NO [ ]

## 2023-10-05 NOTE — PROGRESS NOTE ADULT - NUTRITIONAL ASSESSMENT
This patient has been assessed with a concern for Malnutrition and has been determined to have a diagnosis/diagnoses of Moderate protein-calorie malnutrition.    This patient is being managed with:   Diet Renal Restrictions-  For patients receiving Renal Replacement - No Protein Restr No Conc K No Conc Phos Low Sodium  1000mL Fluid Restriction ([id number])  Supplement Feeding Modality:  Oral  Nepro Cans or Servings Per Day:  1       Frequency:  Two Times a day  Entered: Oct  3 2023 11:01AM   This patient has been assessed with a concern for Malnutrition and has been determined to have a diagnosis/diagnoses of Moderate protein-calorie malnutrition.    This patient is being managed with:   Diet Renal Restrictions-  For patients receiving Renal Replacement - No Protein Restr No Conc K No Conc Phos Low Sodium  1000mL Fluid Restriction ([id number])  Supplement Feeding Modality:  Oral  Nepro Cans or Servings Per Day:  1       Frequency:  Two Times a day  Entered: Oct  3 2023 11:01AM   This patient has been assessed with a concern for Malnutrition and has been determined to have a diagnosis/diagnoses of Moderate protein-calorie malnutrition.    This patient is being managed with:   Diet Renal Restrictions-  For patients receiving Renal Replacement - No Protein Restr No Conc K No Conc Phos Low Sodium  1000mL Fluid Restriction ([id number])  Supplement Feeding Modality:  Oral  Nepro Cans or Servings Per Day:  1       Frequency:  Two Times a day  Entered: Oct  3 2023 11:01AM

## 2023-10-05 NOTE — PROCEDURE NOTE - NSPROCDETAILS_GEN_ALL_CORE
guidewire recovered/lumen(s) aspirated and flushed/sterile dressing applied/sterile technique, catheter placed/ultrasound guidance with use of sterile gel and probe cove
nasogastric/placement confirmed by auscultation/bowel sounds present to 4 quadrants

## 2023-10-05 NOTE — CHART NOTE - NSCHARTNOTEFT_GEN_A_CORE
NUTRITION FOLLOW UP    SOURCE: Patient [ )   Family [ ]    Medical Record (X), MD, IDT rounds, RN    Diet, NPO with Tube Feed:   Tube Feeding Modality: Nasogastric  Nepro with Carb Steady  Total Volume for 24 Hours (mL): 1170  Continuous  Starting Tube Feed Rate {mL per Hour}: 30  Increase Tube Feed Rate by (mL): 10     Every 4 hours  Until Goal Tube Feed Rate (mL per Hour): 65  Tube Feed Duration (in Hours): 18  Tube Feed Start Time: 10:00  Tube Feed Stop Time: 04:00 (10-05-23 @ 12:41) [Active]    Pt now s/p NGT placement due to poor mentation for po/meds. +pressors. Pt s/p HD yesterday, 2.5L removed. Now initiated on EN: Nepro @ 30cc/hr x 18 hrs increase by 10cc/hr q 4 hrs until goal rate of 65cc/hr reached. Goal rate will provide 2,106kcals, 95g protein, 854ml H2O. Last BM 10/5. +stage II sacrum.     CURRENT WEIGHT:  176.1lbs (9/30)    PERTINENT MEDS:   Pertinent Medications: MEDICATIONS  (STANDING):  aspirin  chewable 81 milliGRAM(s) Oral daily  epoetin val (PROCRIT) Injectable 48765 Unit(s) IV Push <User Schedule>  folic acid 1 milliGRAM(s) Oral daily  hydrocortisone hemorrhoidal Suppository 1 Suppository(s) Rectal two times a day  influenza  Vaccine (HIGH DOSE) 0.7 milliLiter(s) IntraMuscular once  levothyroxine 137 MICROGram(s) Oral daily  mesalamine Suppository 1000 milliGRAM(s) Rectal at bedtime  midodrine 10 milliGRAM(s) Oral every 8 hours  mupirocin 2% Nasal 1 Application(s) Both Nostrils two times a day  norepinephrine Infusion 0.05 MICROgram(s)/kG/Min (6.81 mL/Hr) IV Continuous <Continuous>  pantoprazole    Tablet 40 milliGRAM(s) Oral before breakfast  polyethylene glycol 3350 17 Gram(s) Oral daily  senna 2 Tablet(s) Oral at bedtime    MEDICATIONS  (PRN):  acetaminophen     Tablet .. 650 milliGRAM(s) Oral every 6 hours PRN Mild Pain (1 - 3)  aluminum hydroxide/magnesium hydroxide/simethicone Suspension 10 milliLiter(s) Oral every 6 hours PRN dyspepsia      PERTINENT LABS:  10-05 Na134 mmol/L<L> Glu 110 mg/dL<H> K+ 3.7 mmol/L Cr  4.43 mg/dL<H> BUN 66 mg/dL<H> 10-05 Phos 2.5 mg/dL 10-03 Alb 2.5 g/dL<L>      SKIN:  stage II sacrum   EDEMA: +2 bilateral arms, +1 generalized  LAST BM: 10/5    ESTIMATED NEEDS:   [X] no change since previous assessment  [ ] recalculated:     PREVIOUS NUTRITION DIAGNOSIS:    1. moderate malnutrition- addressed with EN via NGT    NUTRITION DIAGNOSIS is :  (x)  Ongoing        NEW NUTRITION DIAGNOSIS: N/A    NUTRITION RECOMMENDATIONS:   1. Nepro @ 30cc/hr x 18hrs via NGT and increased by 10cc/hr until goal rate of 65cc/hr reached  2. Add nephrovite & Vitamin C as able  3. If mentation improves, resume po diet (texture as tolerated, consider minced & moist) + Nepro BID    MONITORING AND EVALUATION:   1. Tolerance to EN  2. EN provision   3. Weights  4. Labs  5. Follow Up per protocol     RD to remain available   Mary Rose RDN   [phone] or TEAMS NUTRITION FOLLOW UP    SOURCE: Patient [ )   Family [ ]    Medical Record (X), MD, IDT rounds, RN    Diet, NPO with Tube Feed:   Tube Feeding Modality: Nasogastric  Nepro with Carb Steady  Total Volume for 24 Hours (mL): 1170  Continuous  Starting Tube Feed Rate {mL per Hour}: 30  Increase Tube Feed Rate by (mL): 10     Every 4 hours  Until Goal Tube Feed Rate (mL per Hour): 65  Tube Feed Duration (in Hours): 18  Tube Feed Start Time: 10:00  Tube Feed Stop Time: 04:00 (10-05-23 @ 12:41) [Active]    Pt now s/p NGT placement due to poor mentation for po/meds. +pressors. Pt s/p HD yesterday, 2.5L removed. Now initiated on EN: Nepro @ 30cc/hr x 18 hrs increase by 10cc/hr q 4 hrs until goal rate of 65cc/hr reached. Goal rate will provide 2,106kcals, 95g protein, 854ml H2O. Last BM 10/5. +stage II sacrum.     CURRENT WEIGHT:  176.1lbs (9/30)    PERTINENT MEDS:   Pertinent Medications: MEDICATIONS  (STANDING):  aspirin  chewable 81 milliGRAM(s) Oral daily  epoetin val (PROCRIT) Injectable 59283 Unit(s) IV Push <User Schedule>  folic acid 1 milliGRAM(s) Oral daily  hydrocortisone hemorrhoidal Suppository 1 Suppository(s) Rectal two times a day  influenza  Vaccine (HIGH DOSE) 0.7 milliLiter(s) IntraMuscular once  levothyroxine 137 MICROGram(s) Oral daily  mesalamine Suppository 1000 milliGRAM(s) Rectal at bedtime  midodrine 10 milliGRAM(s) Oral every 8 hours  mupirocin 2% Nasal 1 Application(s) Both Nostrils two times a day  norepinephrine Infusion 0.05 MICROgram(s)/kG/Min (6.81 mL/Hr) IV Continuous <Continuous>  pantoprazole    Tablet 40 milliGRAM(s) Oral before breakfast  polyethylene glycol 3350 17 Gram(s) Oral daily  senna 2 Tablet(s) Oral at bedtime    MEDICATIONS  (PRN):  acetaminophen     Tablet .. 650 milliGRAM(s) Oral every 6 hours PRN Mild Pain (1 - 3)  aluminum hydroxide/magnesium hydroxide/simethicone Suspension 10 milliLiter(s) Oral every 6 hours PRN dyspepsia      PERTINENT LABS:  10-05 Na134 mmol/L<L> Glu 110 mg/dL<H> K+ 3.7 mmol/L Cr  4.43 mg/dL<H> BUN 66 mg/dL<H> 10-05 Phos 2.5 mg/dL 10-03 Alb 2.5 g/dL<L>      SKIN:  stage II sacrum   EDEMA: +2 bilateral arms, +1 generalized  LAST BM: 10/5    ESTIMATED NEEDS:   [X] no change since previous assessment  [ ] recalculated:     PREVIOUS NUTRITION DIAGNOSIS:    1. moderate malnutrition- addressed with EN via NGT    NUTRITION DIAGNOSIS is :  (x)  Ongoing        NEW NUTRITION DIAGNOSIS: N/A    NUTRITION RECOMMENDATIONS:   1. Nepro @ 30cc/hr x 18hrs via NGT and increased by 10cc/hr until goal rate of 65cc/hr reached  2. Add nephrovite & Vitamin C as able  3. If mentation improves, resume po diet (texture as tolerated, consider minced & moist) + Nepro BID    MONITORING AND EVALUATION:   1. Tolerance to EN  2. EN provision   3. Weights  4. Labs  5. Follow Up per protocol     RD to remain available   Mary Rose RDN   [phone] or TEAMS NUTRITION FOLLOW UP    SOURCE: Patient [ )   Family [ ]    Medical Record (X), MD, IDT rounds, RN    Diet, NPO with Tube Feed:   Tube Feeding Modality: Nasogastric  Nepro with Carb Steady  Total Volume for 24 Hours (mL): 1170  Continuous  Starting Tube Feed Rate {mL per Hour}: 30  Increase Tube Feed Rate by (mL): 10     Every 4 hours  Until Goal Tube Feed Rate (mL per Hour): 65  Tube Feed Duration (in Hours): 18  Tube Feed Start Time: 10:00  Tube Feed Stop Time: 04:00 (10-05-23 @ 12:41) [Active]    Pt now s/p NGT placement due to poor mentation for po/meds. +pressors. Pt s/p HD yesterday, 2.5L removed. Now initiated on EN: Nepro @ 30cc/hr x 18 hrs increase by 10cc/hr q 4 hrs until goal rate of 65cc/hr reached. Goal rate will provide 2,106kcals, 95g protein, 854ml H2O. Last BM 10/5. +stage II sacrum.     CURRENT WEIGHT:  176.1lbs (9/30)    PERTINENT MEDS:   Pertinent Medications: MEDICATIONS  (STANDING):  aspirin  chewable 81 milliGRAM(s) Oral daily  epoetin val (PROCRIT) Injectable 70732 Unit(s) IV Push <User Schedule>  folic acid 1 milliGRAM(s) Oral daily  hydrocortisone hemorrhoidal Suppository 1 Suppository(s) Rectal two times a day  influenza  Vaccine (HIGH DOSE) 0.7 milliLiter(s) IntraMuscular once  levothyroxine 137 MICROGram(s) Oral daily  mesalamine Suppository 1000 milliGRAM(s) Rectal at bedtime  midodrine 10 milliGRAM(s) Oral every 8 hours  mupirocin 2% Nasal 1 Application(s) Both Nostrils two times a day  norepinephrine Infusion 0.05 MICROgram(s)/kG/Min (6.81 mL/Hr) IV Continuous <Continuous>  pantoprazole    Tablet 40 milliGRAM(s) Oral before breakfast  polyethylene glycol 3350 17 Gram(s) Oral daily  senna 2 Tablet(s) Oral at bedtime    MEDICATIONS  (PRN):  acetaminophen     Tablet .. 650 milliGRAM(s) Oral every 6 hours PRN Mild Pain (1 - 3)  aluminum hydroxide/magnesium hydroxide/simethicone Suspension 10 milliLiter(s) Oral every 6 hours PRN dyspepsia      PERTINENT LABS:  10-05 Na134 mmol/L<L> Glu 110 mg/dL<H> K+ 3.7 mmol/L Cr  4.43 mg/dL<H> BUN 66 mg/dL<H> 10-05 Phos 2.5 mg/dL 10-03 Alb 2.5 g/dL<L>      SKIN:  stage II sacrum   EDEMA: +2 bilateral arms, +1 generalized  LAST BM: 10/5    ESTIMATED NEEDS:   [X] no change since previous assessment  [ ] recalculated:     PREVIOUS NUTRITION DIAGNOSIS:    1. moderate malnutrition- addressed with EN via NGT    NUTRITION DIAGNOSIS is :  (x)  Ongoing        NEW NUTRITION DIAGNOSIS: N/A    NUTRITION RECOMMENDATIONS:   1. Nepro @ 30cc/hr x 18hrs via NGT and increased by 10cc/hr until goal rate of 65cc/hr reached  2. Add nephrovite & Vitamin C as able  3. If mentation improves, resume po diet (texture as tolerated, consider minced & moist) + Nepro BID    MONITORING AND EVALUATION:   1. Tolerance to EN  2. EN provision   3. Weights  4. Labs  5. Follow Up per protocol     RD to remain available   Mary Rose RDN   [phone] or TEAMS

## 2023-10-05 NOTE — PROCEDURE NOTE - PROCEDURE DATE TIME, MLM
05-Oct-2023 12:47
Anesthesia Post Evaluation    Patient: Tabby Howard    Procedure(s) Performed: Procedure(s) (LRB):  CHOLECYSTECTOMY, LAPAROSCOPIC (N/A)    Final Anesthesia Type: general      Patient location during evaluation: PACU  Patient participation: Yes- Able to Participate  Level of consciousness: awake and alert, oriented and awake  Post-procedure vital signs: reviewed and stable  Pain management: adequate  Airway patency: patent    PONV status at discharge: No PONV  Anesthetic complications: no      Cardiovascular status: blood pressure returned to baseline and hemodynamically stable  Respiratory status: unassisted, spontaneous ventilation and room air  Hydration status: euvolemic  Follow-up not needed.          Vitals Value Taken Time   /83 07/30/22 1216   Temp 36.6 °C (97.9 °F) 07/30/22 1125   Pulse 70 07/30/22 1219   Resp 12 07/30/22 1219   SpO2 97 % 07/30/22 1219   Vitals shown include unvalidated device data.      Event Time   Out of Recovery 07/30/2022 12:19:06         Pain/Lien Score: Pain Rating Prior to Med Admin: 7 (7/30/2022 12:10 PM)  Pain Rating Post Med Admin: 4 (7/30/2022 12:15 PM)  Lien Score: 8 (7/30/2022 12:15 PM)        
04-Oct-2023 12:12

## 2023-10-05 NOTE — PROGRESS NOTE ADULT - NS ATTEND AMEND GEN_ALL_CORE FT
pt seen and examined more encephelopathic today  ct head no acute finding  angélica came in and states patient gets altered when sick  discussed severity of illness  will follow

## 2023-10-06 LAB
-  AMPICILLIN/SULBACTAM: SIGNIFICANT CHANGE UP
-  CEFAZOLIN: SIGNIFICANT CHANGE UP
-  CLINDAMYCIN: SIGNIFICANT CHANGE UP
-  DAPTOMYCIN: SIGNIFICANT CHANGE UP
-  ERYTHROMYCIN: SIGNIFICANT CHANGE UP
-  GENTAMICIN: SIGNIFICANT CHANGE UP
-  LINEZOLID: SIGNIFICANT CHANGE UP
-  OXACILLIN: SIGNIFICANT CHANGE UP
-  PENICILLIN: SIGNIFICANT CHANGE UP
-  RIFAMPIN: SIGNIFICANT CHANGE UP
-  TETRACYCLINE: SIGNIFICANT CHANGE UP
-  TRIMETHOPRIM/SULFAMETHOXAZOLE: SIGNIFICANT CHANGE UP
-  VANCOMYCIN: SIGNIFICANT CHANGE UP
ALBUMIN SERPL ELPH-MCNC: 2 G/DL — LOW (ref 3.3–5)
ALP SERPL-CCNC: 331 U/L — HIGH (ref 40–120)
ALT FLD-CCNC: 32 U/L — SIGNIFICANT CHANGE UP (ref 10–45)
ANION GAP SERPL CALC-SCNC: 9 MMOL/L — SIGNIFICANT CHANGE UP (ref 5–17)
AST SERPL-CCNC: 31 U/L — SIGNIFICANT CHANGE UP (ref 10–40)
BILIRUB SERPL-MCNC: 0.6 MG/DL — SIGNIFICANT CHANGE UP (ref 0.2–1.2)
BUN SERPL-MCNC: 78 MG/DL — HIGH (ref 7–23)
CALCIUM SERPL-MCNC: 9.9 MG/DL — SIGNIFICANT CHANGE UP (ref 8.4–10.5)
CHLORIDE SERPL-SCNC: 100 MMOL/L — SIGNIFICANT CHANGE UP (ref 96–108)
CO2 SERPL-SCNC: 25 MMOL/L — SIGNIFICANT CHANGE UP (ref 22–31)
CREAT SERPL-MCNC: 5.26 MG/DL — HIGH (ref 0.5–1.3)
CULTURE RESULTS: SIGNIFICANT CHANGE UP
EGFR: 11 ML/MIN/1.73M2 — LOW
GLUCOSE SERPL-MCNC: 146 MG/DL — HIGH (ref 70–99)
HCT VFR BLD CALC: 25.5 % — LOW (ref 39–50)
HGB BLD-MCNC: 8.5 G/DL — LOW (ref 13–17)
MAGNESIUM SERPL-MCNC: 1.8 MG/DL — SIGNIFICANT CHANGE UP (ref 1.6–2.6)
MCHC RBC-ENTMCNC: 30 PG — SIGNIFICANT CHANGE UP (ref 27–34)
MCHC RBC-ENTMCNC: 33.3 GM/DL — SIGNIFICANT CHANGE UP (ref 32–36)
MCV RBC AUTO: 90.1 FL — SIGNIFICANT CHANGE UP (ref 80–100)
METHOD TYPE: SIGNIFICANT CHANGE UP
NRBC # BLD: 0 /100 WBCS — SIGNIFICANT CHANGE UP (ref 0–0)
ORGANISM # SPEC MICROSCOPIC CNT: SIGNIFICANT CHANGE UP
PHOSPHATE SERPL-MCNC: 2.5 MG/DL — SIGNIFICANT CHANGE UP (ref 2.5–4.5)
PLATELET # BLD AUTO: 120 K/UL — LOW (ref 150–400)
POTASSIUM SERPL-MCNC: 3.7 MMOL/L — SIGNIFICANT CHANGE UP (ref 3.5–5.3)
POTASSIUM SERPL-SCNC: 3.7 MMOL/L — SIGNIFICANT CHANGE UP (ref 3.5–5.3)
PROT SERPL-MCNC: 5.4 G/DL — LOW (ref 6–8.3)
RBC # BLD: 2.83 M/UL — LOW (ref 4.2–5.8)
RBC # FLD: 16 % — HIGH (ref 10.3–14.5)
SODIUM SERPL-SCNC: 134 MMOL/L — LOW (ref 135–145)
SPECIMEN SOURCE: SIGNIFICANT CHANGE UP
VANCOMYCIN FLD-MCNC: 24.6 UG/ML — SIGNIFICANT CHANGE UP
VANCOMYCIN TROUGH SERPL-MCNC: 24.6 UG/ML — HIGH (ref 10–20)
WBC # BLD: 15.53 K/UL — HIGH (ref 3.8–10.5)
WBC # FLD AUTO: 15.53 K/UL — HIGH (ref 3.8–10.5)

## 2023-10-06 PROCEDURE — 71045 X-RAY EXAM CHEST 1 VIEW: CPT | Mod: 26

## 2023-10-06 PROCEDURE — 99232 SBSQ HOSP IP/OBS MODERATE 35: CPT

## 2023-10-06 RX ORDER — CHLORHEXIDINE GLUCONATE 213 G/1000ML
1 SOLUTION TOPICAL
Refills: 0 | Status: DISCONTINUED | OUTPATIENT
Start: 2023-10-06 | End: 2023-10-18

## 2023-10-06 RX ORDER — ALBUMIN HUMAN 25 %
100 VIAL (ML) INTRAVENOUS EVERY 4 HOURS
Refills: 0 | Status: COMPLETED | OUTPATIENT
Start: 2023-10-06 | End: 2023-10-06

## 2023-10-06 RX ORDER — FLUDROCORTISONE ACETATE 0.1 MG/1
0.1 TABLET ORAL DAILY
Refills: 0 | Status: DISCONTINUED | OUTPATIENT
Start: 2023-10-06 | End: 2023-10-06

## 2023-10-06 RX ORDER — MIDODRINE HYDROCHLORIDE 2.5 MG/1
15 TABLET ORAL EVERY 8 HOURS
Refills: 0 | Status: DISCONTINUED | OUTPATIENT
Start: 2023-10-06 | End: 2023-10-08

## 2023-10-06 RX ORDER — SODIUM CHLORIDE 9 MG/ML
10 INJECTION INTRAMUSCULAR; INTRAVENOUS; SUBCUTANEOUS
Refills: 0 | Status: DISCONTINUED | OUTPATIENT
Start: 2023-10-06 | End: 2023-10-18

## 2023-10-06 RX ORDER — PANTOPRAZOLE SODIUM 20 MG/1
40 TABLET, DELAYED RELEASE ORAL DAILY
Refills: 0 | Status: DISCONTINUED | OUTPATIENT
Start: 2023-10-06 | End: 2023-10-14

## 2023-10-06 RX ADMIN — PANTOPRAZOLE SODIUM 40 MILLIGRAM(S): 20 TABLET, DELAYED RELEASE ORAL at 05:51

## 2023-10-06 RX ADMIN — Medication 81 MILLIGRAM(S): at 15:09

## 2023-10-06 RX ADMIN — Medication 1 SUPPOSITORY(S): at 05:49

## 2023-10-06 RX ADMIN — Medication 1 MILLIGRAM(S): at 14:30

## 2023-10-06 RX ADMIN — Medication 1000 MILLIGRAM(S): at 21:11

## 2023-10-06 RX ADMIN — MIDODRINE HYDROCHLORIDE 15 MILLIGRAM(S): 2.5 TABLET ORAL at 14:30

## 2023-10-06 RX ADMIN — Medication 137 MICROGRAM(S): at 05:51

## 2023-10-06 RX ADMIN — PANTOPRAZOLE SODIUM 40 MILLIGRAM(S): 20 TABLET, DELAYED RELEASE ORAL at 14:31

## 2023-10-06 RX ADMIN — SENNA PLUS 2 TABLET(S): 8.6 TABLET ORAL at 21:09

## 2023-10-06 RX ADMIN — Medication 50 MILLILITER(S): at 22:00

## 2023-10-06 RX ADMIN — Medication 50 MILLILITER(S): at 19:45

## 2023-10-06 RX ADMIN — Medication 1 SUPPOSITORY(S): at 17:16

## 2023-10-06 RX ADMIN — ERYTHROPOIETIN 10000 UNIT(S): 10000 INJECTION, SOLUTION INTRAVENOUS; SUBCUTANEOUS at 11:07

## 2023-10-06 RX ADMIN — MUPIROCIN 1 APPLICATION(S): 20 OINTMENT TOPICAL at 05:49

## 2023-10-06 RX ADMIN — MIDODRINE HYDROCHLORIDE 15 MILLIGRAM(S): 2.5 TABLET ORAL at 21:08

## 2023-10-06 RX ADMIN — FLUDROCORTISONE ACETATE 0.1 MILLIGRAM(S): 0.1 TABLET ORAL at 09:29

## 2023-10-06 RX ADMIN — MIDODRINE HYDROCHLORIDE 10 MILLIGRAM(S): 2.5 TABLET ORAL at 05:50

## 2023-10-06 RX ADMIN — CHLORHEXIDINE GLUCONATE 1 APPLICATION(S): 213 SOLUTION TOPICAL at 15:08

## 2023-10-06 RX ADMIN — MUPIROCIN 1 APPLICATION(S): 20 OINTMENT TOPICAL at 17:17

## 2023-10-06 NOTE — PROGRESS NOTE ADULT - ASSESSMENT
Patient is a 78y male with a past history of A Fib, ESRD, CAD, Mitral valve replacement, and LAAA clip placement  who presented to [facility] on 9/30 in evening with fever and hypotension.  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  He had a tunnelled catheter, he is encephalopathic, unclear how long he has been on dialysis.   High grade MRSA bacteremia. Limited history at this point. He has a tunnelled HDC and a prosthetic mitral valve.  He was treated for MSSA bacteremia in March of 2023, negative ERENDIRA for valvular lesions, PPM removed and leadless micra PPM placed.  He was hospitalized in August with a rectal bleed,, CT showed  "proctitis" and received 10 days of ertapenem for a clostridial bacteremia.  His permacath was removed on 10/2, culture growing MRSA  Agree with cardiology, he is a poor candidate for ERENDIRA at this point and goals of care will guide approach.  Hopefully catheter  removal  will act as a form of source control. His thrombocytopenia is slowly improving, now 70,000.  10/4 blood cultures with MRSA in 4/4 bottles. .He received HD on 10/4 and 10/5  His vanco level today is 25. Thrombocytopenia is improving, now 120,000.this is an indirect response to treatment  Suggest:  1 Vanco per levels, advise 750 after each HD session and will try to keep levels above 15-20  2 Advise repeat blood cultures today   3 Case reviewed on 10/4 and 10/5 with cardiology, ERENDIRA unlikely to  as he is not an operative candidate  4Supportive care, if we are unable to control bacteremia will consider combination therapy.No good data to support combination therapy results in better outcomes, however if todays blood cultures turn positive will consider addition of renally dosed ceftaroline to try to control bacteremia     Patient is a 78y male with a past history of A Fib, ESRD, CAD, Mitral valve replacement, and LAAA clip placement  who presented to [facility] on 9/30 in evening with fever and hypotension.  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  He had a tunnelled catheter, he is encephalopathic, unclear how long he has been on dialysis.   High grade MRSA bacteremia. Limited history at this point. He has a tunnelled HDC and a prosthetic mitral valve.  He was treated for MSSA bacteremia in March of 2023, negative ERENDIRA for valvular lesions, PPM removed and leadless micra PPM placed.  He was hospitalized in August with a rectal bleed,, CT showed  "proctitis" and received 10 days of ertapenem for a clostridial bacteremia.  His permacath was removed on 10/2, culture growing MRSA  Agree with cardiology, he is a poor candidate for ERENDIRA at this point and goals of care will guide approach.  Hopefully catheter  removal  will act as a form of source control. His thrombocytopenia is slowly improving, now 70,000.  10/4 blood cultures with MRSA in 4/4 bottles. .He received HD on 10/4 and 10/5  His vanco level today is 25. Thrombocytopenia is improving, now 120,000.this is an indirect response to treatment  Suggest:  1 Vanco per levels, advise 750 after each HD session and will try to keep levels above 15-20  2 Advise repeat blood cultures today   3 Case reviewed on 10/4 and 10/5 with cardiology, ERENDIRA unlikely to  as he is not an operative candidate  4Supportive care, if we are unable to control bacteremia will consider combination therapy.No good data to support combination therapy results in better outcomes, however if todays blood cultures turn positive will consider addition of renally dosed ceftaroline to try to control bacteremia     Patient is a 78y male with a past history of A Fib, ESRD, CAD, Mitral valve replacement, and LAAA clip placement  who presented to [facility] on 9/30 in evening with fever and hypotension.  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  He had a tunnelled catheter, he is encephalopathic, unclear how long he has been on dialysis.   High grade MRSA bacteremia. Limited history at this point. He has a tunnelled HDC and a prosthetic mitral valve.  He was treated for MSSA bacteremia in March of 2023, negative ERENDIRA for valvular lesions, PPM removed and leadless micra PPM placed.  He was hospitalized in August with a rectal bleed,, CT showed  "proctitis" and received 10 days of ertapenem for a clostridial bacteremia.  His permacath was removed on 10/2, culture growing MRSA  Agree with cardiology, he is a poor candidate for ERENDIRA at this point and goals of care will guide approach.  Hopefully catheter  removal  will act as a form of source control. His thrombocytopenia is slowly improving, now 70,000.  10/4 blood cultures with MRSA in 4/4 bottles. .He received HD on 10/4 and 10/5  His vanco level today is 25. Thrombocytopenia is improving, now 120,000.this is an indirect response to treatment  Suggest:  1 Vanco per levels, advise 750 after each HD session and will try to keep levels above 15-20  2 Advise repeat blood cultures today   3 Case reviewed on 10/4 and 10/5 with cardiology, ERENDIRA unlikely to  as he is not an operative candidate  4Supportive care, if we are unable to control bacteremia will consider combination therapy.No good data to support combination therapy results in better outcomes, however if todays blood cultures turn positive will consider addition of renally dosed ceftaroline to try to control bacteremia

## 2023-10-06 NOTE — PROGRESS NOTE ADULT - SUBJECTIVE AND OBJECTIVE BOX
JANEE ARCEO   78y   Male    Admitting: ZENA Davidson  HPI:  79 yo M pmhx ESRD came to ED complaining of fatigue. Patient noted to be febrile to 102.2 in ED with complains of cough and wheezing. Received 1l NS bolus and noted to be hypotensive to 80's systolic. Complained of some abdominal pain but unsure why he is in the hospital. Poor historian and unable to fully participate in ROS     PAST MEDICAL & SURGICAL HISTORY:  Chronic atrial fibrillation      History of BPH      CAD (coronary artery disease)      Coronary stent patent      ESRD on dialysis      History of GI bleed      Mitral valve replaced        HEALTH ISSUES - PROBLEM Dx:  Thrombocytopenia    Chronic atrial fibrillation    History of BPH    ESRD on dialysis    CAD (coronary artery disease)    Coronary stent patent    Mitral valve replaced      MEDICATIONS  (STANDING):  aspirin  chewable 81 milliGRAM(s) Oral daily  chlorhexidine 2% Cloths 1 Application(s) Topical <User Schedule>  epoetin val (PROCRIT) Injectable 41310 Unit(s) IV Push <User Schedule>  fludroCORTISONE 0.1 milliGRAM(s) Oral daily  folic acid 1 milliGRAM(s) Oral daily  hydrocortisone hemorrhoidal Suppository 1 Suppository(s) Rectal two times a day  influenza  Vaccine (HIGH DOSE) 0.7 milliLiter(s) IntraMuscular once  levothyroxine 137 MICROGram(s) Oral daily  mesalamine Suppository 1000 milliGRAM(s) Rectal at bedtime  midodrine 15 milliGRAM(s) Oral every 8 hours  mupirocin 2% Nasal 1 Application(s) Both Nostrils two times a day  norepinephrine Infusion 0.05 MICROgram(s)/kG/Min (6.81 mL/Hr) IV Continuous <Continuous>  pantoprazole   Suspension 40 milliGRAM(s) Enteral Tube daily  polyethylene glycol 3350 17 Gram(s) Oral daily  senna 2 Tablet(s) Oral at bedtime    MEDICATIONS  (PRN):  acetaminophen     Tablet .. 650 milliGRAM(s) Oral every 6 hours PRN Mild Pain (1 - 3)  aluminum hydroxide/magnesium hydroxide/simethicone Suspension 10 milliLiter(s) Oral every 6 hours PRN dyspepsia  sodium chloride 0.9% lock flush 10 milliLiter(s) IV Push every 1 hour PRN Pre/post blood products, medications, blood draw, and to maintain line patency    Allergies    levofloxacin (Unknown)  alfuzosin (Unknown)  tamsulosin (Unknown)  Myrbetriq (Unknown)    INTERVAL HPI/OVERNIGHT EVENTS:  Patient S&E at bedside. Appears comfortable.     VITAL SIGNS:  T(F): 98.6 (10-06-23 @ 09:30)  HR: 91 (10-06-23 @ 09:30)  BP: 131/53 (10-06-23 @ 09:30)  RR: 19 (10-06-23 @ 09:30)  SpO2: 100% (10-06-23 @ 09:30)      PHYSICAL EXAM:  Constitutional: NAD  Eyes: sclera non-icteric  Neck: no JVD  Respiratory: decreased BS, scattered crackles ant.  Cardiovascular: RRR, no M/R/G  Gastrointestinal: soft, NTND, no masses palpable  Extremities: no calf tenderness elicited      Labs:             8.5    15.53 )-----------( 120      ( 10-06 @ 05:00 )             25.5                8.3    13.26 )-----------( 75       ( 10-05 @ 05:30 )             23.7                9.0    15.72 )-----------( 62       ( 10-04 @ 15:25 )             26.6                8.7    12.80 )-----------( 62       ( 10-04 @ 06:00 )             25.9       10-06    134<L>  |  100  |  78<H>  ----------------------------<  146<H>  3.7   |  25  |  5.26<H>    Ca    9.9      06 Oct 2023 05:00  Phos  2.5     10-06  Mg     1.8     10-06    TPro  5.4<L>  /  Alb  2.0<L>  /  TBili  0.6  /  DBili  x   /  AST  31  /  ALT  32  /  AlkPhos  331<H>  10-06    Consultant notes reviewed : YES [x ] ; NO [ ]   JANEE ARCEO   78y   Male    Admitting: ZENA Davidson  HPI:  77 yo M pmhx ESRD came to ED complaining of fatigue. Patient noted to be febrile to 102.2 in ED with complains of cough and wheezing. Received 1l NS bolus and noted to be hypotensive to 80's systolic. Complained of some abdominal pain but unsure why he is in the hospital. Poor historian and unable to fully participate in ROS     PAST MEDICAL & SURGICAL HISTORY:  Chronic atrial fibrillation      History of BPH      CAD (coronary artery disease)      Coronary stent patent      ESRD on dialysis      History of GI bleed      Mitral valve replaced        HEALTH ISSUES - PROBLEM Dx:  Thrombocytopenia    Chronic atrial fibrillation    History of BPH    ESRD on dialysis    CAD (coronary artery disease)    Coronary stent patent    Mitral valve replaced      MEDICATIONS  (STANDING):  aspirin  chewable 81 milliGRAM(s) Oral daily  chlorhexidine 2% Cloths 1 Application(s) Topical <User Schedule>  epoetin val (PROCRIT) Injectable 15424 Unit(s) IV Push <User Schedule>  fludroCORTISONE 0.1 milliGRAM(s) Oral daily  folic acid 1 milliGRAM(s) Oral daily  hydrocortisone hemorrhoidal Suppository 1 Suppository(s) Rectal two times a day  influenza  Vaccine (HIGH DOSE) 0.7 milliLiter(s) IntraMuscular once  levothyroxine 137 MICROGram(s) Oral daily  mesalamine Suppository 1000 milliGRAM(s) Rectal at bedtime  midodrine 15 milliGRAM(s) Oral every 8 hours  mupirocin 2% Nasal 1 Application(s) Both Nostrils two times a day  norepinephrine Infusion 0.05 MICROgram(s)/kG/Min (6.81 mL/Hr) IV Continuous <Continuous>  pantoprazole   Suspension 40 milliGRAM(s) Enteral Tube daily  polyethylene glycol 3350 17 Gram(s) Oral daily  senna 2 Tablet(s) Oral at bedtime    MEDICATIONS  (PRN):  acetaminophen     Tablet .. 650 milliGRAM(s) Oral every 6 hours PRN Mild Pain (1 - 3)  aluminum hydroxide/magnesium hydroxide/simethicone Suspension 10 milliLiter(s) Oral every 6 hours PRN dyspepsia  sodium chloride 0.9% lock flush 10 milliLiter(s) IV Push every 1 hour PRN Pre/post blood products, medications, blood draw, and to maintain line patency    Allergies    levofloxacin (Unknown)  alfuzosin (Unknown)  tamsulosin (Unknown)  Myrbetriq (Unknown)    INTERVAL HPI/OVERNIGHT EVENTS:  Patient S&E at bedside. Appears comfortable.     VITAL SIGNS:  T(F): 98.6 (10-06-23 @ 09:30)  HR: 91 (10-06-23 @ 09:30)  BP: 131/53 (10-06-23 @ 09:30)  RR: 19 (10-06-23 @ 09:30)  SpO2: 100% (10-06-23 @ 09:30)      PHYSICAL EXAM:  Constitutional: NAD  Eyes: sclera non-icteric  Neck: no JVD  Respiratory: decreased BS, scattered crackles ant.  Cardiovascular: RRR, no M/R/G  Gastrointestinal: soft, NTND, no masses palpable  Extremities: no calf tenderness elicited      Labs:             8.5    15.53 )-----------( 120      ( 10-06 @ 05:00 )             25.5                8.3    13.26 )-----------( 75       ( 10-05 @ 05:30 )             23.7                9.0    15.72 )-----------( 62       ( 10-04 @ 15:25 )             26.6                8.7    12.80 )-----------( 62       ( 10-04 @ 06:00 )             25.9       10-06    134<L>  |  100  |  78<H>  ----------------------------<  146<H>  3.7   |  25  |  5.26<H>    Ca    9.9      06 Oct 2023 05:00  Phos  2.5     10-06  Mg     1.8     10-06    TPro  5.4<L>  /  Alb  2.0<L>  /  TBili  0.6  /  DBili  x   /  AST  31  /  ALT  32  /  AlkPhos  331<H>  10-06    Consultant notes reviewed : YES [x ] ; NO [ ]   JANEE ARCEO   78y   Male    Admitting: ZENA Davidson  HPI:  79 yo M pmhx ESRD came to ED complaining of fatigue. Patient noted to be febrile to 102.2 in ED with complains of cough and wheezing. Received 1l NS bolus and noted to be hypotensive to 80's systolic. Complained of some abdominal pain but unsure why he is in the hospital. Poor historian and unable to fully participate in ROS     PAST MEDICAL & SURGICAL HISTORY:  Chronic atrial fibrillation      History of BPH      CAD (coronary artery disease)      Coronary stent patent      ESRD on dialysis      History of GI bleed      Mitral valve replaced        HEALTH ISSUES - PROBLEM Dx:  Thrombocytopenia    Chronic atrial fibrillation    History of BPH    ESRD on dialysis    CAD (coronary artery disease)    Coronary stent patent    Mitral valve replaced      MEDICATIONS  (STANDING):  aspirin  chewable 81 milliGRAM(s) Oral daily  chlorhexidine 2% Cloths 1 Application(s) Topical <User Schedule>  epoetin val (PROCRIT) Injectable 08966 Unit(s) IV Push <User Schedule>  fludroCORTISONE 0.1 milliGRAM(s) Oral daily  folic acid 1 milliGRAM(s) Oral daily  hydrocortisone hemorrhoidal Suppository 1 Suppository(s) Rectal two times a day  influenza  Vaccine (HIGH DOSE) 0.7 milliLiter(s) IntraMuscular once  levothyroxine 137 MICROGram(s) Oral daily  mesalamine Suppository 1000 milliGRAM(s) Rectal at bedtime  midodrine 15 milliGRAM(s) Oral every 8 hours  mupirocin 2% Nasal 1 Application(s) Both Nostrils two times a day  norepinephrine Infusion 0.05 MICROgram(s)/kG/Min (6.81 mL/Hr) IV Continuous <Continuous>  pantoprazole   Suspension 40 milliGRAM(s) Enteral Tube daily  polyethylene glycol 3350 17 Gram(s) Oral daily  senna 2 Tablet(s) Oral at bedtime    MEDICATIONS  (PRN):  acetaminophen     Tablet .. 650 milliGRAM(s) Oral every 6 hours PRN Mild Pain (1 - 3)  aluminum hydroxide/magnesium hydroxide/simethicone Suspension 10 milliLiter(s) Oral every 6 hours PRN dyspepsia  sodium chloride 0.9% lock flush 10 milliLiter(s) IV Push every 1 hour PRN Pre/post blood products, medications, blood draw, and to maintain line patency    Allergies    levofloxacin (Unknown)  alfuzosin (Unknown)  tamsulosin (Unknown)  Myrbetriq (Unknown)    INTERVAL HPI/OVERNIGHT EVENTS:  Patient S&E at bedside. Appears comfortable.     VITAL SIGNS:  T(F): 98.6 (10-06-23 @ 09:30)  HR: 91 (10-06-23 @ 09:30)  BP: 131/53 (10-06-23 @ 09:30)  RR: 19 (10-06-23 @ 09:30)  SpO2: 100% (10-06-23 @ 09:30)      PHYSICAL EXAM:  Constitutional: NAD  Eyes: sclera non-icteric  Neck: no JVD  Respiratory: decreased BS, scattered crackles ant.  Cardiovascular: RRR, no M/R/G  Gastrointestinal: soft, NTND, no masses palpable  Extremities: no calf tenderness elicited      Labs:             8.5    15.53 )-----------( 120      ( 10-06 @ 05:00 )             25.5                8.3    13.26 )-----------( 75       ( 10-05 @ 05:30 )             23.7                9.0    15.72 )-----------( 62       ( 10-04 @ 15:25 )             26.6                8.7    12.80 )-----------( 62       ( 10-04 @ 06:00 )             25.9       10-06    134<L>  |  100  |  78<H>  ----------------------------<  146<H>  3.7   |  25  |  5.26<H>    Ca    9.9      06 Oct 2023 05:00  Phos  2.5     10-06  Mg     1.8     10-06    TPro  5.4<L>  /  Alb  2.0<L>  /  TBili  0.6  /  DBili  x   /  AST  31  /  ALT  32  /  AlkPhos  331<H>  10-06    Consultant notes reviewed : YES [x ] ; NO [ ]

## 2023-10-06 NOTE — PROGRESS NOTE ADULT - ASSESSMENT
Physical Examination:  GENERAL:               Awake, No acute distress.    HEENT:                   No JVD, Moist MM  PULM:                     Bilateral air entry, No Rhonchi, No Wheezing  CVS:                         S1, S2,  + Murmur  ABD:                        Soft, nondistended, nontender, normoactive bowel sounds,   EXT:                         + bilateral upper extremity edema, nontender, No Cyanosis or Clubbing   Vascular:                 Warm Extremities, Normal Capillary refill, Normal Distal Pulses  NEURO:                  Awake, Arousable does not follows commands  PSYC:                      Calm, Limited Insight.        Assessment  Septic shock, suspect line sepsis  MRSA blood stream infection  AMS- metabolic encephelopathy  Thrombocytopenia, suspect from Sepsis  Underlying h/o CAD s/p PCI, BPH, ESRD on HD, s/p MVR, Afib      Plan:  As mental status improving, suspect metabolic encephelopathy   Repeat blood cultures today to monitor for clearance of bacteremia  On low dose pressors, taper to maintain map > 60  Increase midodrine dose, add florinef  antibiotics as per ID, dose vancomycin post HD  Perm cath removed 10/3/2023   Thrombocytopenia improving  ID, Renal, Heme f/u  med rec done    PMD:				                   Notified(Date):  Family Updated: 	Surekha BLISS  [phone], C  [phone](preferred line)                 Date: 10/2/2023 by Dr. Davidson      Sedation & Analgesia:	n/a  Diet/Nutrition:		Diet, Renal Restrictions: For patients receiving Renal Replacement - No Protein Restr, No Conc K, No Conc Phos, Low Sodium (09-30-23 @ 23:51) [Active]  GI PPx:			Protonix  DVT Ppx:		Low plts, suspect will go lower, Venodynes    	       Activity:		    Head of Bed:               35-45 Deg  Glycemic Control:        n/a    Lines:  PIV  CENTRAL LINE: 	[X] YES [ ] NO RIJ Shiley // Perm cath present on admission removed in or 10/2/2023            LOCATION:   	                       DATE INSERTED:   	                    REMOVE:  [ ] YES [X ] NO    A-LINE:  	                [ ] YES [ ] NO                      LOCATION:   	                       DATE INSERTED: 		            REMOVE:  [ ] YES [ ] NO    SU: 		        [ ] YES [ ] NO  		                                       DATE INSERTED:		            REMOVE:  [ ] YES [ ] NO      Restraints may deemed necessary to prevent removal of life-sustaining devices [ ] YES   [  x  ]  NO    Disposition:  ICU Care  Goals of Care: JULIANN from 8/30/23 Full code   Physical Examination:  GENERAL:               Awake, No acute distress.    HEENT:                   No JVD, Moist MM  PULM:                     Bilateral air entry, No Rhonchi, No Wheezing  CVS:                         S1, S2,  + Murmur  ABD:                        Soft, nondistended, nontender, normoactive bowel sounds,   EXT:                         + bilateral upper extremity edema, nontender, No Cyanosis or Clubbing   Vascular:                 Warm Extremities, Normal Capillary refill, Normal Distal Pulses  NEURO:                  Awake, Arousable does not follows commands  PSYC:                      Calm, Limited Insight.        Assessment  Septic shock, suspect line sepsis  MRSA blood stream infection  AMS- metabolic encephelopathy  Thrombocytopenia, suspect from Sepsis  Underlying h/o CAD s/p PCI, BPH, ESRD on HD, s/p MVR, Afib      Plan:  As mental status improving, suspect metabolic encephelopathy   Repeat blood cultures today to monitor for clearance of bacteremia  On low dose pressors, taper to maintain map > 60  Increase midodrine dose, add florinef  antibiotics as per ID, dose vancomycin post HD  Perm cath removed 10/3/2023   Thrombocytopenia improving  ID, Renal, Heme f/u  med rec done    PMD:				                   Notified(Date):  Family Updated: 	Surekha BLISS  [phone], C  [phone](preferred line)                 Date: 10/2/2023 by Dr. Davidson      Sedation & Analgesia:	n/a  Diet/Nutrition:		Diet, Renal Restrictions: For patients receiving Renal Replacement - No Protein Restr, No Conc K, No Conc Phos, Low Sodium (09-30-23 @ 23:51) [Active]  GI PPx:			Protonix  DVT Ppx:		Low plts, suspect will go lower, Venodynes    	       Activity:		    Head of Bed:               35-45 Deg  Glycemic Control:        n/a    Lines:  PIV  CENTRAL LINE: 	[X] YES [ ] NO RIJ Shiley // Perm cath present on admission removed in or 10/2/2023            LOCATION:   	                       DATE INSERTED:   	                    REMOVE:  [ ] YES [X ] NO    A-LINE:  	                [ ] YES [ ] NO                      LOCATION:   	                       DATE INSERTED: 		            REMOVE:  [ ] YES [ ] NO    SU: 		        [ ] YES [ ] NO  		                                       DATE INSERTED:		            REMOVE:  [ ] YES [ ] NO      Restraints may deemed necessary to prevent removal of life-sustaining devices [ ] YES   [  x  ]  NO    Disposition:  ICU Care  Goals of Care: JULIANN from 8/30/23 Full code   Physical Examination:  GENERAL:               Awake, No acute distress.    HEENT:                   No JVD, Moist MM  PULM:                     Bilateral air entry, No Rhonchi, No Wheezing  CVS:                         S1, S2,  + Murmur  ABD:                        Soft, nondistended, nontender, normoactive bowel sounds,   EXT:                         + bilateral upper extremity edema, nontender, No Cyanosis or Clubbing   Vascular:                 Warm Extremities, Normal Capillary refill, Normal Distal Pulses  NEURO:                  Awake, Arousable does not follows commands  PSYC:                      Calm, Limited Insight.        Assessment  Septic shock, suspect line sepsis  MRSA blood stream infection  AMS- metabolic encephelopathy  Thrombocytopenia, suspect from Sepsis  Underlying h/o CAD s/p PCI, BPH, ESRD on HD, s/p MVR, Afib      Plan:  As mental status improving, suspect metabolic encephelopathy   Repeat blood cultures today to monitor for clearance of bacteremia  On low dose pressors, taper to maintain map > 60  Increase midodrine dose, add florinef  antibiotics as per ID, dose vancomycin post HD  Perm cath removed 10/3/2023   Thrombocytopenia improving  ID, Renal, Heme f/u  med rec done    PMD:				                   Notified(Date):  Family Updated: 	Surekha BLISS  [phone], C  [phone](preferred line)                 Date: 10/2/2023 by Dr. Davidson      Sedation & Analgesia:	n/a  Diet/Nutrition:		Diet, Renal Restrictions: For patients receiving Renal Replacement - No Protein Restr, No Conc K, No Conc Phos, Low Sodium (09-30-23 @ 23:51) [Active]  GI PPx:			Protonix  DVT Ppx:		Low plts, suspect will go lower, Venodynes    	       Activity:		    Head of Bed:               35-45 Deg  Glycemic Control:        n/a    Lines:  PIV  CENTRAL LINE: 	[X] YES [ ] NO RIJ Shiley // Perm cath present on admission removed in or 10/2/2023            LOCATION:   	                       DATE INSERTED:   	                    REMOVE:  [ ] YES [X ] NO    A-LINE:  	                [ ] YES [ ] NO                      LOCATION:   	                       DATE INSERTED: 		            REMOVE:  [ ] YES [ ] NO    SU: 		        [ ] YES [ ] NO  		                                       DATE INSERTED:		            REMOVE:  [ ] YES [ ] NO      Restraints may deemed necessary to prevent removal of life-sustaining devices [ ] YES   [  x  ]  NO    Disposition:  ICU Care  Goals of Care: JUILANN from 8/30/23 Full code

## 2023-10-06 NOTE — PROGRESS NOTE ADULT - SUBJECTIVE AND OBJECTIVE BOX
CC: f/u for MRSA bacteremia  Date of service 10/6/2023  Patient reports:   He remains lethargic and is poorly interactive. He received HD yesterday and vanco level is 23  REVIEW OF SYSTEMS:  All other review of systems negative (Comprehensive ROS)  limited by condition  Antimicrobials Day #  :Vanco per levels- day 6    Other Medications Reviewed  MEDICATIONS  (STANDING):  aspirin  chewable 81 milliGRAM(s) Oral daily  chlorhexidine 2% Cloths 1 Application(s) Topical <User Schedule>  epoetin val (PROCRIT) Injectable 86177 Unit(s) IV Push <User Schedule>  fludroCORTISONE 0.1 milliGRAM(s) Oral daily  folic acid 1 milliGRAM(s) Oral daily  hydrocortisone hemorrhoidal Suppository 1 Suppository(s) Rectal two times a day  influenza  Vaccine (HIGH DOSE) 0.7 milliLiter(s) IntraMuscular once  levothyroxine 137 MICROGram(s) Oral daily  mesalamine Suppository 1000 milliGRAM(s) Rectal at bedtime  midodrine 15 milliGRAM(s) Oral every 8 hours  mupirocin 2% Nasal 1 Application(s) Both Nostrils two times a day  norepinephrine Infusion 0.05 MICROgram(s)/kG/Min (6.81 mL/Hr) IV Continuous <Continuous>  pantoprazole   Suspension 40 milliGRAM(s) Enteral Tube daily  polyethylene glycol 3350 17 Gram(s) Oral daily  senna 2 Tablet(s) Oral at bedtime    T(F): 98.6 (10-06-23 @ 09:30), Max: 98.8 (10-06-23 @ 05:30)  HR: 91 (10-06-23 @ 09:30)  BP: 131/53 (10-06-23 @ 09:30)  RR: 19 (10-06-23 @ 09:30)  SpO2: 100% (10-06-23 @ 09:30)  Wt(kg): --    PHYSICAL EXAM:  General: lethargic and poorly interactive  Eyes:  anicteric, no conjunctival injection, no discharge  Oropharynx: no lesions or injection 	  Neck: supple, without adenopathy  Lungs: coarse BS  Heart: regular rate and rhythm; no murmur,  Abdomen: soft, nondistended, nontender, without mass or organomegaly  Skin: no rash  Extremities: no clubbing, cyanosis, ++edema  Neurologic: poorly interactive    LAB RESULTS:                        8.5    15.53 )-----------( 120      ( 06 Oct 2023 05:00 )             25.5     10-06    134<L>  |  100  |  78<H>  ----------------------------<  146<H>  3.7   |  25  |  5.26<H>    Ca    9.9      06 Oct 2023 05:00  Phos  2.5     10-06  Mg     1.8     10-06    TPro  5.4<L>  /  Alb  2.0<L>  /  TBili  0.6  /  DBili  x   /  AST  31  /  ALT  32  /  AlkPhos  331<H>  10-06    LIVER FUNCTIONS - ( 06 Oct 2023 05:00 )  Alb: 2.0 g/dL / Pro: 5.4 g/dL / ALK PHOS: 331 U/L / ALT: 32 U/L / AST: 31 U/L / GGT: x           Urinalysis Basic - ( 06 Oct 2023 05:00 )    Color: x / Appearance: x / SG: x / pH: x  Gluc: 146 mg/dL / Ketone: x  / Bili: x / Urobili: x   Blood: x / Protein: x / Nitrite: x   Leuk Esterase: x / RBC: x / WBC x   Sq Epi: x / Non Sq Epi: x / Bacteria: x      MICROBIOLOGY:  RECENT CULTURES:  10-04 @ 06:00 .Blood Blood     Growth in aerobic and anaerobic bottles: Staphylococcus aureus    Growth in aerobic bottle: Gram Positive Cocci in Clusters  Growth in anaerobic bottle: Gram Positive Cocci in Clusters    10-02 @ 16:21 .Catheter + MRSA Bacterimia Permacath Removal Aerobic/Anaerobic Right  IJ Permacath TIP Methicillin resistant Staphylococcus aureus    Greater than or equal to 15 Colonies Methicillin Resistant Staphylococcus  aureus          RADIOLOGY REVIEWED:    < from: Xray Chest 1 View AP/PA (10.05.23 @ 12:20) >  INTERPRETATION:  AP chest on October 5, 2023 at 12:25 PM. Patient had NG   tube adjustment.    Right chest is excluded.    Heart likely enlarged.    Sternotomy, prosthetic heart valve, pacemaker, left atrial appendage   clipping, and large right jugular line remain.    There is a persistent mild left base infiltrate.    Present film shows NG tube now with tip in the stomach.    IMPRESSION: NG tube now has tip down in the stomach.    --- End of Report ---    < end of copied text >   CC: f/u for MRSA bacteremia  Date of service 10/6/2023  Patient reports:   He remains lethargic and is poorly interactive. He received HD yesterday and vanco level is 23  REVIEW OF SYSTEMS:  All other review of systems negative (Comprehensive ROS)  limited by condition  Antimicrobials Day #  :Vanco per levels- day 6    Other Medications Reviewed  MEDICATIONS  (STANDING):  aspirin  chewable 81 milliGRAM(s) Oral daily  chlorhexidine 2% Cloths 1 Application(s) Topical <User Schedule>  epoetin val (PROCRIT) Injectable 68158 Unit(s) IV Push <User Schedule>  fludroCORTISONE 0.1 milliGRAM(s) Oral daily  folic acid 1 milliGRAM(s) Oral daily  hydrocortisone hemorrhoidal Suppository 1 Suppository(s) Rectal two times a day  influenza  Vaccine (HIGH DOSE) 0.7 milliLiter(s) IntraMuscular once  levothyroxine 137 MICROGram(s) Oral daily  mesalamine Suppository 1000 milliGRAM(s) Rectal at bedtime  midodrine 15 milliGRAM(s) Oral every 8 hours  mupirocin 2% Nasal 1 Application(s) Both Nostrils two times a day  norepinephrine Infusion 0.05 MICROgram(s)/kG/Min (6.81 mL/Hr) IV Continuous <Continuous>  pantoprazole   Suspension 40 milliGRAM(s) Enteral Tube daily  polyethylene glycol 3350 17 Gram(s) Oral daily  senna 2 Tablet(s) Oral at bedtime    T(F): 98.6 (10-06-23 @ 09:30), Max: 98.8 (10-06-23 @ 05:30)  HR: 91 (10-06-23 @ 09:30)  BP: 131/53 (10-06-23 @ 09:30)  RR: 19 (10-06-23 @ 09:30)  SpO2: 100% (10-06-23 @ 09:30)  Wt(kg): --    PHYSICAL EXAM:  General: lethargic and poorly interactive  Eyes:  anicteric, no conjunctival injection, no discharge  Oropharynx: no lesions or injection 	  Neck: supple, without adenopathy  Lungs: coarse BS  Heart: regular rate and rhythm; no murmur,  Abdomen: soft, nondistended, nontender, without mass or organomegaly  Skin: no rash  Extremities: no clubbing, cyanosis, ++edema  Neurologic: poorly interactive    LAB RESULTS:                        8.5    15.53 )-----------( 120      ( 06 Oct 2023 05:00 )             25.5     10-06    134<L>  |  100  |  78<H>  ----------------------------<  146<H>  3.7   |  25  |  5.26<H>    Ca    9.9      06 Oct 2023 05:00  Phos  2.5     10-06  Mg     1.8     10-06    TPro  5.4<L>  /  Alb  2.0<L>  /  TBili  0.6  /  DBili  x   /  AST  31  /  ALT  32  /  AlkPhos  331<H>  10-06    LIVER FUNCTIONS - ( 06 Oct 2023 05:00 )  Alb: 2.0 g/dL / Pro: 5.4 g/dL / ALK PHOS: 331 U/L / ALT: 32 U/L / AST: 31 U/L / GGT: x           Urinalysis Basic - ( 06 Oct 2023 05:00 )    Color: x / Appearance: x / SG: x / pH: x  Gluc: 146 mg/dL / Ketone: x  / Bili: x / Urobili: x   Blood: x / Protein: x / Nitrite: x   Leuk Esterase: x / RBC: x / WBC x   Sq Epi: x / Non Sq Epi: x / Bacteria: x      MICROBIOLOGY:  RECENT CULTURES:  10-04 @ 06:00 .Blood Blood     Growth in aerobic and anaerobic bottles: Staphylococcus aureus    Growth in aerobic bottle: Gram Positive Cocci in Clusters  Growth in anaerobic bottle: Gram Positive Cocci in Clusters    10-02 @ 16:21 .Catheter + MRSA Bacterimia Permacath Removal Aerobic/Anaerobic Right  IJ Permacath TIP Methicillin resistant Staphylococcus aureus    Greater than or equal to 15 Colonies Methicillin Resistant Staphylococcus  aureus          RADIOLOGY REVIEWED:    < from: Xray Chest 1 View AP/PA (10.05.23 @ 12:20) >  INTERPRETATION:  AP chest on October 5, 2023 at 12:25 PM. Patient had NG   tube adjustment.    Right chest is excluded.    Heart likely enlarged.    Sternotomy, prosthetic heart valve, pacemaker, left atrial appendage   clipping, and large right jugular line remain.    There is a persistent mild left base infiltrate.    Present film shows NG tube now with tip in the stomach.    IMPRESSION: NG tube now has tip down in the stomach.    --- End of Report ---    < end of copied text >   CC: f/u for MRSA bacteremia  Date of service 10/6/2023  Patient reports:   He remains lethargic and is poorly interactive. He received HD yesterday and vanco level is 23  REVIEW OF SYSTEMS:  All other review of systems negative (Comprehensive ROS)  limited by condition  Antimicrobials Day #  :Vanco per levels- day 6    Other Medications Reviewed  MEDICATIONS  (STANDING):  aspirin  chewable 81 milliGRAM(s) Oral daily  chlorhexidine 2% Cloths 1 Application(s) Topical <User Schedule>  epoetin val (PROCRIT) Injectable 96197 Unit(s) IV Push <User Schedule>  fludroCORTISONE 0.1 milliGRAM(s) Oral daily  folic acid 1 milliGRAM(s) Oral daily  hydrocortisone hemorrhoidal Suppository 1 Suppository(s) Rectal two times a day  influenza  Vaccine (HIGH DOSE) 0.7 milliLiter(s) IntraMuscular once  levothyroxine 137 MICROGram(s) Oral daily  mesalamine Suppository 1000 milliGRAM(s) Rectal at bedtime  midodrine 15 milliGRAM(s) Oral every 8 hours  mupirocin 2% Nasal 1 Application(s) Both Nostrils two times a day  norepinephrine Infusion 0.05 MICROgram(s)/kG/Min (6.81 mL/Hr) IV Continuous <Continuous>  pantoprazole   Suspension 40 milliGRAM(s) Enteral Tube daily  polyethylene glycol 3350 17 Gram(s) Oral daily  senna 2 Tablet(s) Oral at bedtime    T(F): 98.6 (10-06-23 @ 09:30), Max: 98.8 (10-06-23 @ 05:30)  HR: 91 (10-06-23 @ 09:30)  BP: 131/53 (10-06-23 @ 09:30)  RR: 19 (10-06-23 @ 09:30)  SpO2: 100% (10-06-23 @ 09:30)  Wt(kg): --    PHYSICAL EXAM:  General: lethargic and poorly interactive  Eyes:  anicteric, no conjunctival injection, no discharge  Oropharynx: no lesions or injection 	  Neck: supple, without adenopathy  Lungs: coarse BS  Heart: regular rate and rhythm; no murmur,  Abdomen: soft, nondistended, nontender, without mass or organomegaly  Skin: no rash  Extremities: no clubbing, cyanosis, ++edema  Neurologic: poorly interactive    LAB RESULTS:                        8.5    15.53 )-----------( 120      ( 06 Oct 2023 05:00 )             25.5     10-06    134<L>  |  100  |  78<H>  ----------------------------<  146<H>  3.7   |  25  |  5.26<H>    Ca    9.9      06 Oct 2023 05:00  Phos  2.5     10-06  Mg     1.8     10-06    TPro  5.4<L>  /  Alb  2.0<L>  /  TBili  0.6  /  DBili  x   /  AST  31  /  ALT  32  /  AlkPhos  331<H>  10-06    LIVER FUNCTIONS - ( 06 Oct 2023 05:00 )  Alb: 2.0 g/dL / Pro: 5.4 g/dL / ALK PHOS: 331 U/L / ALT: 32 U/L / AST: 31 U/L / GGT: x           Urinalysis Basic - ( 06 Oct 2023 05:00 )    Color: x / Appearance: x / SG: x / pH: x  Gluc: 146 mg/dL / Ketone: x  / Bili: x / Urobili: x   Blood: x / Protein: x / Nitrite: x   Leuk Esterase: x / RBC: x / WBC x   Sq Epi: x / Non Sq Epi: x / Bacteria: x      MICROBIOLOGY:  RECENT CULTURES:  10-04 @ 06:00 .Blood Blood     Growth in aerobic and anaerobic bottles: Staphylococcus aureus    Growth in aerobic bottle: Gram Positive Cocci in Clusters  Growth in anaerobic bottle: Gram Positive Cocci in Clusters    10-02 @ 16:21 .Catheter + MRSA Bacterimia Permacath Removal Aerobic/Anaerobic Right  IJ Permacath TIP Methicillin resistant Staphylococcus aureus    Greater than or equal to 15 Colonies Methicillin Resistant Staphylococcus  aureus          RADIOLOGY REVIEWED:    < from: Xray Chest 1 View AP/PA (10.05.23 @ 12:20) >  INTERPRETATION:  AP chest on October 5, 2023 at 12:25 PM. Patient had NG   tube adjustment.    Right chest is excluded.    Heart likely enlarged.    Sternotomy, prosthetic heart valve, pacemaker, left atrial appendage   clipping, and large right jugular line remain.    There is a persistent mild left base infiltrate.    Present film shows NG tube now with tip in the stomach.    IMPRESSION: NG tube now has tip down in the stomach.    --- End of Report ---    < end of copied text >

## 2023-10-06 NOTE — PROGRESS NOTE ADULT - SUBJECTIVE AND OBJECTIVE BOX
In ICU, on NC O2    Vital Signs Last 24 Hrs  T(C): 36.7 (10-06-23 @ 12:30), Max: 37.1 (10-06-23 @ 05:30)  T(F): 98 (10-06-23 @ 12:30), Max: 98.8 (10-06-23 @ 05:30)  HR: 104 (10-06-23 @ 12:30) (76 - 105)  BP: 112/58 (10-06-23 @ 12:30) (81/54 - 131/53)  BP(mean): 70 (10-06-23 @ 09:30) (60 - 77)  RR: 23 (10-06-23 @ 12:30) (11 - 23)  SpO2: 100% (10-06-23 @ 12:30) (89% - 100%)    I&O's Detail    05 Oct 2023 07:01  -  06 Oct 2023 07:00  --------------------------------------------------------  IN:    Nepro: 710 mL    Norepinephrine: 74.3 mL  Total IN: 784.3 mL    OUT:  Total OUT: 0 mL    06 Oct 2023 07:01  -  06 Oct 2023 18:57  --------------------------------------------------------  IN:    Other (mL): 800 mL  Total IN: 800 mL    OUT:    Other (mL): 2400 mL  Total OUT: 2400 mL    s1s2  b/l air entry  soft, ND  sm edema, LUE AVF + bruit                                                           8.5    15.53 )-----------( 120      ( 06 Oct 2023 05:00 )             25.5     06 Oct 2023 05:00    134    |  100    |  78     ----------------------------<  146    3.7     |  25     |  5.26     Ca    9.9        06 Oct 2023 05:00  Phos  2.5       06 Oct 2023 05:00  Mg     1.8       06 Oct 2023 05:00    TPro  5.4    /  Alb  2.0    /  TBili  0.6    /  DBili  x      /  AST  31     /  ALT  32     /  AlkPhos  331    06 Oct 2023 05:00    LIVER FUNCTIONS - ( 06 Oct 2023 05:00 )  Alb: 2.0 g/dL / Pro: 5.4 g/dL / ALK PHOS: 331 U/L / ALT: 32 U/L / AST: 31 U/L / GGT: x           Culture - Blood (collected 04 Oct 2023 06:00)  Source: .Blood Blood  Gram Stain (05 Oct 2023 07:40):    Growth in aerobic bottle: Gram Positive Cocci in Clusters    Growth in anaerobic bottle: Gram Positive Cocci in Clusters  Final Report (06 Oct 2023 14:49):    Growth in aerobic and anaerobic bottles: Methicillin Resistant    Staphylococcus aureus    See previous culture [id number]    Culture - Blood (collected 04 Oct 2023 06:00)  Source: .Blood Blood  Gram Stain (05 Oct 2023 08:18):    Growth in aerobic bottle: Gram Positive Cocci in Clusters    Growth in anaerobic bottle: Gram Positive Cocci in Clusters  Final Report (06 Oct 2023 14:47):    Growth in aerobic and anaerobic bottles: Methicillin Resistant    Staphylococcus aureus  Organism: Methicillin resistant Staphylococcus aureus (06 Oct 2023 14:47)  Organism: Methicillin resistant Staphylococcus aureus (06 Oct 2023 14:47)    acetaminophen     Tablet .. 650 milliGRAM(s) Oral every 6 hours PRN  aluminum hydroxide/magnesium hydroxide/simethicone Suspension 10 milliLiter(s) Oral every 6 hours PRN  aspirin  chewable 81 milliGRAM(s) Oral daily  chlorhexidine 2% Cloths 1 Application(s) Topical <User Schedule>  epoetin val (PROCRIT) Injectable 23263 Unit(s) IV Push <User Schedule>  fludroCORTISONE 0.1 milliGRAM(s) Oral daily  folic acid 1 milliGRAM(s) Oral daily  hydrocortisone hemorrhoidal Suppository 1 Suppository(s) Rectal two times a day  influenza  Vaccine (HIGH DOSE) 0.7 milliLiter(s) IntraMuscular once  levothyroxine 137 MICROGram(s) Oral daily  mesalamine Suppository 1000 milliGRAM(s) Rectal at bedtime  midodrine 15 milliGRAM(s) Oral every 8 hours  mupirocin 2% Nasal 1 Application(s) Both Nostrils two times a day  norepinephrine Infusion 0.05 MICROgram(s)/kG/Min IV Continuous <Continuous>  pantoprazole   Suspension 40 milliGRAM(s) Enteral Tube daily  polyethylene glycol 3350 17 Gram(s) Oral daily  senna 2 Tablet(s) Oral at bedtime  sodium chloride 0.9% lock flush 10 milliLiter(s) IV Push every 1 hour PRN    A/P:    ESRD on HD at  SNF MWF via perm cath, Afib, CM, EF 45 - 50%  Has LUE AVF not yet mature   Adm 9/30/23 w/MRSA bacteremia, persistent   Pls dose Vanco by levels  Perm cath d/c-d 10/2/23  Cath cx pos for MRSA  Abx per ID  HD MWF via temp IJ HD cath (placed 10/4/23)  CXR w/R pl effusion   Fluid removal 1.6kg w/HD today   BP support per ICU team    [phone] In ICU, on NC O2    Vital Signs Last 24 Hrs  T(C): 36.7 (10-06-23 @ 12:30), Max: 37.1 (10-06-23 @ 05:30)  T(F): 98 (10-06-23 @ 12:30), Max: 98.8 (10-06-23 @ 05:30)  HR: 104 (10-06-23 @ 12:30) (76 - 105)  BP: 112/58 (10-06-23 @ 12:30) (81/54 - 131/53)  BP(mean): 70 (10-06-23 @ 09:30) (60 - 77)  RR: 23 (10-06-23 @ 12:30) (11 - 23)  SpO2: 100% (10-06-23 @ 12:30) (89% - 100%)    I&O's Detail    05 Oct 2023 07:01  -  06 Oct 2023 07:00  --------------------------------------------------------  IN:    Nepro: 710 mL    Norepinephrine: 74.3 mL  Total IN: 784.3 mL    OUT:  Total OUT: 0 mL    06 Oct 2023 07:01  -  06 Oct 2023 18:57  --------------------------------------------------------  IN:    Other (mL): 800 mL  Total IN: 800 mL    OUT:    Other (mL): 2400 mL  Total OUT: 2400 mL    s1s2  b/l air entry  soft, ND  sm edema, LUE AVF + bruit                                                           8.5    15.53 )-----------( 120      ( 06 Oct 2023 05:00 )             25.5     06 Oct 2023 05:00    134    |  100    |  78     ----------------------------<  146    3.7     |  25     |  5.26     Ca    9.9        06 Oct 2023 05:00  Phos  2.5       06 Oct 2023 05:00  Mg     1.8       06 Oct 2023 05:00    TPro  5.4    /  Alb  2.0    /  TBili  0.6    /  DBili  x      /  AST  31     /  ALT  32     /  AlkPhos  331    06 Oct 2023 05:00    LIVER FUNCTIONS - ( 06 Oct 2023 05:00 )  Alb: 2.0 g/dL / Pro: 5.4 g/dL / ALK PHOS: 331 U/L / ALT: 32 U/L / AST: 31 U/L / GGT: x           Culture - Blood (collected 04 Oct 2023 06:00)  Source: .Blood Blood  Gram Stain (05 Oct 2023 07:40):    Growth in aerobic bottle: Gram Positive Cocci in Clusters    Growth in anaerobic bottle: Gram Positive Cocci in Clusters  Final Report (06 Oct 2023 14:49):    Growth in aerobic and anaerobic bottles: Methicillin Resistant    Staphylococcus aureus    See previous culture [id number]    Culture - Blood (collected 04 Oct 2023 06:00)  Source: .Blood Blood  Gram Stain (05 Oct 2023 08:18):    Growth in aerobic bottle: Gram Positive Cocci in Clusters    Growth in anaerobic bottle: Gram Positive Cocci in Clusters  Final Report (06 Oct 2023 14:47):    Growth in aerobic and anaerobic bottles: Methicillin Resistant    Staphylococcus aureus  Organism: Methicillin resistant Staphylococcus aureus (06 Oct 2023 14:47)  Organism: Methicillin resistant Staphylococcus aureus (06 Oct 2023 14:47)    acetaminophen     Tablet .. 650 milliGRAM(s) Oral every 6 hours PRN  aluminum hydroxide/magnesium hydroxide/simethicone Suspension 10 milliLiter(s) Oral every 6 hours PRN  aspirin  chewable 81 milliGRAM(s) Oral daily  chlorhexidine 2% Cloths 1 Application(s) Topical <User Schedule>  epoetin val (PROCRIT) Injectable 38376 Unit(s) IV Push <User Schedule>  fludroCORTISONE 0.1 milliGRAM(s) Oral daily  folic acid 1 milliGRAM(s) Oral daily  hydrocortisone hemorrhoidal Suppository 1 Suppository(s) Rectal two times a day  influenza  Vaccine (HIGH DOSE) 0.7 milliLiter(s) IntraMuscular once  levothyroxine 137 MICROGram(s) Oral daily  mesalamine Suppository 1000 milliGRAM(s) Rectal at bedtime  midodrine 15 milliGRAM(s) Oral every 8 hours  mupirocin 2% Nasal 1 Application(s) Both Nostrils two times a day  norepinephrine Infusion 0.05 MICROgram(s)/kG/Min IV Continuous <Continuous>  pantoprazole   Suspension 40 milliGRAM(s) Enteral Tube daily  polyethylene glycol 3350 17 Gram(s) Oral daily  senna 2 Tablet(s) Oral at bedtime  sodium chloride 0.9% lock flush 10 milliLiter(s) IV Push every 1 hour PRN    A/P:    ESRD on HD at  SNF MWF via perm cath, Afib, CM, EF 45 - 50%  Has LUE AVF not yet mature   Adm 9/30/23 w/MRSA bacteremia, persistent   Pls dose Vanco by levels  Perm cath d/c-d 10/2/23  Cath cx pos for MRSA  Abx per ID  HD MWF via temp IJ HD cath (placed 10/4/23)  CXR w/R pl effusion   Fluid removal 1.6kg w/HD today   BP support per ICU team    [phone] In ICU, on NC O2    Vital Signs Last 24 Hrs  T(C): 36.7 (10-06-23 @ 12:30), Max: 37.1 (10-06-23 @ 05:30)  T(F): 98 (10-06-23 @ 12:30), Max: 98.8 (10-06-23 @ 05:30)  HR: 104 (10-06-23 @ 12:30) (76 - 105)  BP: 112/58 (10-06-23 @ 12:30) (81/54 - 131/53)  BP(mean): 70 (10-06-23 @ 09:30) (60 - 77)  RR: 23 (10-06-23 @ 12:30) (11 - 23)  SpO2: 100% (10-06-23 @ 12:30) (89% - 100%)    I&O's Detail    05 Oct 2023 07:01  -  06 Oct 2023 07:00  --------------------------------------------------------  IN:    Nepro: 710 mL    Norepinephrine: 74.3 mL  Total IN: 784.3 mL    OUT:  Total OUT: 0 mL    06 Oct 2023 07:01  -  06 Oct 2023 18:57  --------------------------------------------------------  IN:    Other (mL): 800 mL  Total IN: 800 mL    OUT:    Other (mL): 2400 mL  Total OUT: 2400 mL    s1s2  b/l air entry  soft, ND  sm edema, LUE AVF + bruit                                                           8.5    15.53 )-----------( 120      ( 06 Oct 2023 05:00 )             25.5     06 Oct 2023 05:00    134    |  100    |  78     ----------------------------<  146    3.7     |  25     |  5.26     Ca    9.9        06 Oct 2023 05:00  Phos  2.5       06 Oct 2023 05:00  Mg     1.8       06 Oct 2023 05:00    TPro  5.4    /  Alb  2.0    /  TBili  0.6    /  DBili  x      /  AST  31     /  ALT  32     /  AlkPhos  331    06 Oct 2023 05:00    LIVER FUNCTIONS - ( 06 Oct 2023 05:00 )  Alb: 2.0 g/dL / Pro: 5.4 g/dL / ALK PHOS: 331 U/L / ALT: 32 U/L / AST: 31 U/L / GGT: x           Culture - Blood (collected 04 Oct 2023 06:00)  Source: .Blood Blood  Gram Stain (05 Oct 2023 07:40):    Growth in aerobic bottle: Gram Positive Cocci in Clusters    Growth in anaerobic bottle: Gram Positive Cocci in Clusters  Final Report (06 Oct 2023 14:49):    Growth in aerobic and anaerobic bottles: Methicillin Resistant    Staphylococcus aureus    See previous culture [id number]    Culture - Blood (collected 04 Oct 2023 06:00)  Source: .Blood Blood  Gram Stain (05 Oct 2023 08:18):    Growth in aerobic bottle: Gram Positive Cocci in Clusters    Growth in anaerobic bottle: Gram Positive Cocci in Clusters  Final Report (06 Oct 2023 14:47):    Growth in aerobic and anaerobic bottles: Methicillin Resistant    Staphylococcus aureus  Organism: Methicillin resistant Staphylococcus aureus (06 Oct 2023 14:47)  Organism: Methicillin resistant Staphylococcus aureus (06 Oct 2023 14:47)    acetaminophen     Tablet .. 650 milliGRAM(s) Oral every 6 hours PRN  aluminum hydroxide/magnesium hydroxide/simethicone Suspension 10 milliLiter(s) Oral every 6 hours PRN  aspirin  chewable 81 milliGRAM(s) Oral daily  chlorhexidine 2% Cloths 1 Application(s) Topical <User Schedule>  epoetin val (PROCRIT) Injectable 74586 Unit(s) IV Push <User Schedule>  fludroCORTISONE 0.1 milliGRAM(s) Oral daily  folic acid 1 milliGRAM(s) Oral daily  hydrocortisone hemorrhoidal Suppository 1 Suppository(s) Rectal two times a day  influenza  Vaccine (HIGH DOSE) 0.7 milliLiter(s) IntraMuscular once  levothyroxine 137 MICROGram(s) Oral daily  mesalamine Suppository 1000 milliGRAM(s) Rectal at bedtime  midodrine 15 milliGRAM(s) Oral every 8 hours  mupirocin 2% Nasal 1 Application(s) Both Nostrils two times a day  norepinephrine Infusion 0.05 MICROgram(s)/kG/Min IV Continuous <Continuous>  pantoprazole   Suspension 40 milliGRAM(s) Enteral Tube daily  polyethylene glycol 3350 17 Gram(s) Oral daily  senna 2 Tablet(s) Oral at bedtime  sodium chloride 0.9% lock flush 10 milliLiter(s) IV Push every 1 hour PRN    A/P:    ESRD on HD at  SNF MWF via perm cath, Afib, CM, EF 45 - 50%  Has LUE AVF not yet mature   Adm 9/30/23 w/MRSA bacteremia, persistent   Pls dose Vanco by levels  Perm cath d/c-d 10/2/23  Cath cx pos for MRSA  Abx per ID  HD MWF via temp IJ HD cath (placed 10/4/23)  CXR w/R pl effusion   Fluid removal 1.6kg w/HD today   BP support per ICU team    [phone]

## 2023-10-06 NOTE — PROGRESS NOTE ADULT - SUBJECTIVE AND OBJECTIVE BOX
Patient is a 78y old  Male who presents with a chief complaint of Sepsis (06 Oct 2023 18:57)    BRIEF HOSPITAL COURSE:  77 yo m pmhx Afib on Eliquis, CAD, MVR, ESRD on HD via tunneled catheter admitted with septic shock 2/2 PNA and MRSA bacteremia s/p tunneled HD cath removal by vascular, metabolic encephalopathy, thrombocytopenia.     Events last 24 hours:   -Remains in vasopressor-dependent shock state w/ low-dose Levophed infusion.  -S/p 1.6L fluid removal via HD today.  -Satting well on NC. Afebrile.     PAST MEDICAL & SURGICAL HISTORY:  Chronic atrial fibrillation  History of BPH  CAD (coronary artery disease)  Coronary stent patent  ESRD on dialysis  History of GI bleed  Mitral valve replaced    Review of Systems:  Due to patient mentation, subjective information was not able to be obtained from the patient. History was obtained, to the extent possible, from review of the chart and collateral sources of information.     Medications:  midodrine 15 milliGRAM(s) Oral every 8 hours  norepinephrine Infusion 0.05 MICROgram(s)/kG/Min IV Continuous <Continuous>  acetaminophen     Tablet .. 650 milliGRAM(s) Oral every 6 hours PRN  aspirin  chewable 81 milliGRAM(s) Oral daily  aluminum hydroxide/magnesium hydroxide/simethicone Suspension 10 milliLiter(s) Oral every 6 hours PRN  mesalamine Suppository 1000 milliGRAM(s) Rectal at bedtime  pantoprazole   Suspension 40 milliGRAM(s) Enteral Tube daily  polyethylene glycol 3350 17 Gram(s) Oral daily  senna 2 Tablet(s) Oral at bedtime  levothyroxine 137 MICROGram(s) Oral daily  albumin human 25% IVPB 100 milliLiter(s) IV Intermittent every 4 hours  folic acid 1 milliGRAM(s) Oral daily  sodium chloride 0.9% lock flush 10 milliLiter(s) IV Push every 1 hour PRN  epoetin val (PROCRIT) Injectable 44966 Unit(s) IV Push <User Schedule>  influenza  Vaccine (HIGH DOSE) 0.7 milliLiter(s) IntraMuscular once  chlorhexidine 2% Cloths 1 Application(s) Topical <User Schedule>  hydrocortisone hemorrhoidal Suppository 1 Suppository(s) Rectal two times a day  mupirocin 2% Nasal 1 Application(s) Both Nostrils two times a day    ICU Vital Signs Last 24 Hrs  T(C): 36.7 (06 Oct 2023 12:30), Max: 37.1 (06 Oct 2023 05:30)  T(F): 98 (06 Oct 2023 12:30), Max: 98.8 (06 Oct 2023 05:30)  HR: 104 (06 Oct 2023 12:30) (76 - 105)  BP: 112/58 (06 Oct 2023 12:30) (81/54 - 131/53)  BP(mean): 70 (06 Oct 2023 09:30) (60 - 77)  ABP: --  ABP(mean): --  RR: 23 (06 Oct 2023 12:30) (11 - 23)  SpO2: 100% (06 Oct 2023 12:30) (89% - 100%)  O2 Parameters below as of 06 Oct 2023 12:30  Patient On (Oxygen Delivery Method): nasal cannula  O2 Flow (L/min): 2    I&O's Detail  05 Oct 2023 07:01  -  06 Oct 2023 07:00  --------------------------------------------------------  IN:    Nepro: 710 mL    Norepinephrine: 74.3 mL  Total IN: 784.3 mL  OUT:  Total OUT: 0 mL  Total NET: 784.3 mL    06 Oct 2023 07:01  -  06 Oct 2023 19:17  --------------------------------------------------------  IN:    Other (mL): 800 mL  Total IN: 800 mL  OUT:    Other (mL): 2400 mL  Total OUT: 2400 mL  Total NET: -1600 mL    LABS:                      8.5    15.53 )-----------( 120      ( 06 Oct 2023 05:00 )             25.5     10-06  134<L>  |  100  |  78<H>  ----------------------------<  146<H>  3.7   |  25  |  5.26<H>  Ca    9.9      06 Oct 2023 05:00  Phos  2.5     10-06  Mg     1.8     10-06  TPro  5.4<L>  /  Alb  2.0<L>  /  TBili  0.6  /  DBili  x   /  AST  31  /  ALT  32  /  AlkPhos  331<H>  10-06    CAPILLARY BLOOD GLUCOSE    Urinalysis Basic - ( 06 Oct 2023 05:00 )  Color: x / Appearance: x / SG: x / pH: x  Gluc: 146 mg/dL / Ketone: x  / Bili: x / Urobili: x   Blood: x / Protein: x / Nitrite: x   Leuk Esterase: x / RBC: x / WBC x   Sq Epi: x / Non Sq Epi: x / Bacteria: x    CULTURES:  Culture Results:   Growth in aerobic and anaerobic bottles: Methicillin Resistant  Staphylococcus aureus (10-04-23 @ 06:00)  Culture Results:   Growth in aerobic and anaerobic bottles: Methicillin Resistant  Staphylococcus aureus  See previous culture [id number] (10-04-23 @ 06:00)  Culture Results:   Greater than or equal to 15 Colonies Methicillin Resistant Staphylococcus  aureus (10-02-23 @ 16:21)  Rapid RVP Result: NotDetec (10-01-23 @ 15:45)  Culture Results:   Growth in aerobic and anaerobic bottles: Methicillin Resistant  Staphylococcus aureus  See previous culture  [id number] (09-30-23 @ 19:15)  Culture Results:   Growth in aerobic and anaerobic bottles: Methicillin Resistant  Staphylococcus aureus  Direct identification is available within approximately 3-5  hours either by Blood Panel Multiplexed PCR or Direct  MALDI-TOF. Details: [email or web address] (09-30-23 @ 19:10)    Physical Examination:  General: Chronically ill-appearing adult male.   HEENT: PERRL.  NECK: Supple.   PULM: Clear to auscultation bilaterally.  CVS: s1/s2.  ABD: Soft, nondistended, nontender, normoactive bowel sounds.  EXT: No edema, nontender.  SKIN: Warm.    RADIOLOGY:   < from: Xray Chest 1 View- PORTABLE-Routine (Xray Chest 1 View- PORTABLE-Routine in AM.) (10.06.23 @ 08:23) >  ACC: [id number] EXAM:  XR CHEST PORTABLE ROUTINE 1V   ORDERED BY: JULIA CRUZ   PROCEDURE DATE:  10/06/2023    IMPRESSION:  No significant change.  Moderate right pleural effusion. Cannot exclude underlying infiltrate or   atelectasis.  Cardiomegaly.  Right central line and enteric catheter in satisfactory position.    CRITICAL CARE TIME SPENT:  38 minutes of critical care time spent providing medical care for patient's acute illness/conditions that impairs at least one vital organ system and/or poses a high risk of imminent or life threatening deterioration in the patient's condition. It includes time spent evaluating and treating the patient's acute illness as well as time spent reviewing labs, radiology, discussing goals of care with patient and/or patient's family, and discussing the case with a multidisciplinary team, in an effort to prevent further life threatening deterioration or end organ damage. This time is independent of any procedures performed.  Patient is a 78y old  Male who presents with a chief complaint of Sepsis (06 Oct 2023 18:57)    BRIEF HOSPITAL COURSE:  79 yo m pmhx Afib on Eliquis, CAD, MVR, ESRD on HD via tunneled catheter admitted with septic shock 2/2 PNA and MRSA bacteremia s/p tunneled HD cath removal by vascular, metabolic encephalopathy, thrombocytopenia.     Events last 24 hours:   -Remains in vasopressor-dependent shock state w/ low-dose Levophed infusion.  -S/p 1.6L fluid removal via HD today.  -Satting well on NC. Afebrile.     PAST MEDICAL & SURGICAL HISTORY:  Chronic atrial fibrillation  History of BPH  CAD (coronary artery disease)  Coronary stent patent  ESRD on dialysis  History of GI bleed  Mitral valve replaced    Review of Systems:  Due to patient mentation, subjective information was not able to be obtained from the patient. History was obtained, to the extent possible, from review of the chart and collateral sources of information.     Medications:  midodrine 15 milliGRAM(s) Oral every 8 hours  norepinephrine Infusion 0.05 MICROgram(s)/kG/Min IV Continuous <Continuous>  acetaminophen     Tablet .. 650 milliGRAM(s) Oral every 6 hours PRN  aspirin  chewable 81 milliGRAM(s) Oral daily  aluminum hydroxide/magnesium hydroxide/simethicone Suspension 10 milliLiter(s) Oral every 6 hours PRN  mesalamine Suppository 1000 milliGRAM(s) Rectal at bedtime  pantoprazole   Suspension 40 milliGRAM(s) Enteral Tube daily  polyethylene glycol 3350 17 Gram(s) Oral daily  senna 2 Tablet(s) Oral at bedtime  levothyroxine 137 MICROGram(s) Oral daily  albumin human 25% IVPB 100 milliLiter(s) IV Intermittent every 4 hours  folic acid 1 milliGRAM(s) Oral daily  sodium chloride 0.9% lock flush 10 milliLiter(s) IV Push every 1 hour PRN  epoetin val (PROCRIT) Injectable 10065 Unit(s) IV Push <User Schedule>  influenza  Vaccine (HIGH DOSE) 0.7 milliLiter(s) IntraMuscular once  chlorhexidine 2% Cloths 1 Application(s) Topical <User Schedule>  hydrocortisone hemorrhoidal Suppository 1 Suppository(s) Rectal two times a day  mupirocin 2% Nasal 1 Application(s) Both Nostrils two times a day    ICU Vital Signs Last 24 Hrs  T(C): 36.7 (06 Oct 2023 12:30), Max: 37.1 (06 Oct 2023 05:30)  T(F): 98 (06 Oct 2023 12:30), Max: 98.8 (06 Oct 2023 05:30)  HR: 104 (06 Oct 2023 12:30) (76 - 105)  BP: 112/58 (06 Oct 2023 12:30) (81/54 - 131/53)  BP(mean): 70 (06 Oct 2023 09:30) (60 - 77)  ABP: --  ABP(mean): --  RR: 23 (06 Oct 2023 12:30) (11 - 23)  SpO2: 100% (06 Oct 2023 12:30) (89% - 100%)  O2 Parameters below as of 06 Oct 2023 12:30  Patient On (Oxygen Delivery Method): nasal cannula  O2 Flow (L/min): 2    I&O's Detail  05 Oct 2023 07:01  -  06 Oct 2023 07:00  --------------------------------------------------------  IN:    Nepro: 710 mL    Norepinephrine: 74.3 mL  Total IN: 784.3 mL  OUT:  Total OUT: 0 mL  Total NET: 784.3 mL    06 Oct 2023 07:01  -  06 Oct 2023 19:17  --------------------------------------------------------  IN:    Other (mL): 800 mL  Total IN: 800 mL  OUT:    Other (mL): 2400 mL  Total OUT: 2400 mL  Total NET: -1600 mL    LABS:                      8.5    15.53 )-----------( 120      ( 06 Oct 2023 05:00 )             25.5     10-06  134<L>  |  100  |  78<H>  ----------------------------<  146<H>  3.7   |  25  |  5.26<H>  Ca    9.9      06 Oct 2023 05:00  Phos  2.5     10-06  Mg     1.8     10-06  TPro  5.4<L>  /  Alb  2.0<L>  /  TBili  0.6  /  DBili  x   /  AST  31  /  ALT  32  /  AlkPhos  331<H>  10-06    CAPILLARY BLOOD GLUCOSE    Urinalysis Basic - ( 06 Oct 2023 05:00 )  Color: x / Appearance: x / SG: x / pH: x  Gluc: 146 mg/dL / Ketone: x  / Bili: x / Urobili: x   Blood: x / Protein: x / Nitrite: x   Leuk Esterase: x / RBC: x / WBC x   Sq Epi: x / Non Sq Epi: x / Bacteria: x    CULTURES:  Culture Results:   Growth in aerobic and anaerobic bottles: Methicillin Resistant  Staphylococcus aureus (10-04-23 @ 06:00)  Culture Results:   Growth in aerobic and anaerobic bottles: Methicillin Resistant  Staphylococcus aureus  See previous culture [id number] (10-04-23 @ 06:00)  Culture Results:   Greater than or equal to 15 Colonies Methicillin Resistant Staphylococcus  aureus (10-02-23 @ 16:21)  Rapid RVP Result: NotDetec (10-01-23 @ 15:45)  Culture Results:   Growth in aerobic and anaerobic bottles: Methicillin Resistant  Staphylococcus aureus  See previous culture  [id number] (09-30-23 @ 19:15)  Culture Results:   Growth in aerobic and anaerobic bottles: Methicillin Resistant  Staphylococcus aureus  Direct identification is available within approximately 3-5  hours either by Blood Panel Multiplexed PCR or Direct  MALDI-TOF. Details: [email or web address] (09-30-23 @ 19:10)    Physical Examination:  General: Chronically ill-appearing adult male.   HEENT: PERRL.  NECK: Supple.   PULM: Clear to auscultation bilaterally.  CVS: s1/s2.  ABD: Soft, nondistended, nontender, normoactive bowel sounds.  EXT: No edema, nontender.  SKIN: Warm.    RADIOLOGY:   < from: Xray Chest 1 View- PORTABLE-Routine (Xray Chest 1 View- PORTABLE-Routine in AM.) (10.06.23 @ 08:23) >  ACC: [id number] EXAM:  XR CHEST PORTABLE ROUTINE 1V   ORDERED BY: JULIA CRUZ   PROCEDURE DATE:  10/06/2023    IMPRESSION:  No significant change.  Moderate right pleural effusion. Cannot exclude underlying infiltrate or   atelectasis.  Cardiomegaly.  Right central line and enteric catheter in satisfactory position.    CRITICAL CARE TIME SPENT:  38 minutes of critical care time spent providing medical care for patient's acute illness/conditions that impairs at least one vital organ system and/or poses a high risk of imminent or life threatening deterioration in the patient's condition. It includes time spent evaluating and treating the patient's acute illness as well as time spent reviewing labs, radiology, discussing goals of care with patient and/or patient's family, and discussing the case with a multidisciplinary team, in an effort to prevent further life threatening deterioration or end organ damage. This time is independent of any procedures performed.  Patient is a 78y old  Male who presents with a chief complaint of Sepsis (06 Oct 2023 18:57)    BRIEF HOSPITAL COURSE:  79 yo m pmhx Afib on Eliquis, CAD, MVR, ESRD on HD via tunneled catheter admitted with septic shock 2/2 PNA and MRSA bacteremia s/p tunneled HD cath removal by vascular, metabolic encephalopathy, thrombocytopenia.     Events last 24 hours:   -Remains in vasopressor-dependent shock state w/ low-dose Levophed infusion.  -S/p 1.6L fluid removal via HD today.  -Satting well on NC. Afebrile.     PAST MEDICAL & SURGICAL HISTORY:  Chronic atrial fibrillation  History of BPH  CAD (coronary artery disease)  Coronary stent patent  ESRD on dialysis  History of GI bleed  Mitral valve replaced    Review of Systems:  Due to patient mentation, subjective information was not able to be obtained from the patient. History was obtained, to the extent possible, from review of the chart and collateral sources of information.     Medications:  midodrine 15 milliGRAM(s) Oral every 8 hours  norepinephrine Infusion 0.05 MICROgram(s)/kG/Min IV Continuous <Continuous>  acetaminophen     Tablet .. 650 milliGRAM(s) Oral every 6 hours PRN  aspirin  chewable 81 milliGRAM(s) Oral daily  aluminum hydroxide/magnesium hydroxide/simethicone Suspension 10 milliLiter(s) Oral every 6 hours PRN  mesalamine Suppository 1000 milliGRAM(s) Rectal at bedtime  pantoprazole   Suspension 40 milliGRAM(s) Enteral Tube daily  polyethylene glycol 3350 17 Gram(s) Oral daily  senna 2 Tablet(s) Oral at bedtime  levothyroxine 137 MICROGram(s) Oral daily  albumin human 25% IVPB 100 milliLiter(s) IV Intermittent every 4 hours  folic acid 1 milliGRAM(s) Oral daily  sodium chloride 0.9% lock flush 10 milliLiter(s) IV Push every 1 hour PRN  epoetin val (PROCRIT) Injectable 80225 Unit(s) IV Push <User Schedule>  influenza  Vaccine (HIGH DOSE) 0.7 milliLiter(s) IntraMuscular once  chlorhexidine 2% Cloths 1 Application(s) Topical <User Schedule>  hydrocortisone hemorrhoidal Suppository 1 Suppository(s) Rectal two times a day  mupirocin 2% Nasal 1 Application(s) Both Nostrils two times a day    ICU Vital Signs Last 24 Hrs  T(C): 36.7 (06 Oct 2023 12:30), Max: 37.1 (06 Oct 2023 05:30)  T(F): 98 (06 Oct 2023 12:30), Max: 98.8 (06 Oct 2023 05:30)  HR: 104 (06 Oct 2023 12:30) (76 - 105)  BP: 112/58 (06 Oct 2023 12:30) (81/54 - 131/53)  BP(mean): 70 (06 Oct 2023 09:30) (60 - 77)  ABP: --  ABP(mean): --  RR: 23 (06 Oct 2023 12:30) (11 - 23)  SpO2: 100% (06 Oct 2023 12:30) (89% - 100%)  O2 Parameters below as of 06 Oct 2023 12:30  Patient On (Oxygen Delivery Method): nasal cannula  O2 Flow (L/min): 2    I&O's Detail  05 Oct 2023 07:01  -  06 Oct 2023 07:00  --------------------------------------------------------  IN:    Nepro: 710 mL    Norepinephrine: 74.3 mL  Total IN: 784.3 mL  OUT:  Total OUT: 0 mL  Total NET: 784.3 mL    06 Oct 2023 07:01  -  06 Oct 2023 19:17  --------------------------------------------------------  IN:    Other (mL): 800 mL  Total IN: 800 mL  OUT:    Other (mL): 2400 mL  Total OUT: 2400 mL  Total NET: -1600 mL    LABS:                      8.5    15.53 )-----------( 120      ( 06 Oct 2023 05:00 )             25.5     10-06  134<L>  |  100  |  78<H>  ----------------------------<  146<H>  3.7   |  25  |  5.26<H>  Ca    9.9      06 Oct 2023 05:00  Phos  2.5     10-06  Mg     1.8     10-06  TPro  5.4<L>  /  Alb  2.0<L>  /  TBili  0.6  /  DBili  x   /  AST  31  /  ALT  32  /  AlkPhos  331<H>  10-06    CAPILLARY BLOOD GLUCOSE    Urinalysis Basic - ( 06 Oct 2023 05:00 )  Color: x / Appearance: x / SG: x / pH: x  Gluc: 146 mg/dL / Ketone: x  / Bili: x / Urobili: x   Blood: x / Protein: x / Nitrite: x   Leuk Esterase: x / RBC: x / WBC x   Sq Epi: x / Non Sq Epi: x / Bacteria: x    CULTURES:  Culture Results:   Growth in aerobic and anaerobic bottles: Methicillin Resistant  Staphylococcus aureus (10-04-23 @ 06:00)  Culture Results:   Growth in aerobic and anaerobic bottles: Methicillin Resistant  Staphylococcus aureus  See previous culture [id number] (10-04-23 @ 06:00)  Culture Results:   Greater than or equal to 15 Colonies Methicillin Resistant Staphylococcus  aureus (10-02-23 @ 16:21)  Rapid RVP Result: NotDetec (10-01-23 @ 15:45)  Culture Results:   Growth in aerobic and anaerobic bottles: Methicillin Resistant  Staphylococcus aureus  See previous culture  [id number] (09-30-23 @ 19:15)  Culture Results:   Growth in aerobic and anaerobic bottles: Methicillin Resistant  Staphylococcus aureus  Direct identification is available within approximately 3-5  hours either by Blood Panel Multiplexed PCR or Direct  MALDI-TOF. Details: [email or web address] (09-30-23 @ 19:10)    Physical Examination:  General: Chronically ill-appearing adult male.   HEENT: PERRL.  NECK: Supple.   PULM: Clear to auscultation bilaterally.  CVS: s1/s2.  ABD: Soft, nondistended, nontender, normoactive bowel sounds.  EXT: No edema, nontender.  SKIN: Warm.    RADIOLOGY:   < from: Xray Chest 1 View- PORTABLE-Routine (Xray Chest 1 View- PORTABLE-Routine in AM.) (10.06.23 @ 08:23) >  ACC: [id number] EXAM:  XR CHEST PORTABLE ROUTINE 1V   ORDERED BY: JULIA CRUZ   PROCEDURE DATE:  10/06/2023    IMPRESSION:  No significant change.  Moderate right pleural effusion. Cannot exclude underlying infiltrate or   atelectasis.  Cardiomegaly.  Right central line and enteric catheter in satisfactory position.    CRITICAL CARE TIME SPENT:  38 minutes of critical care time spent providing medical care for patient's acute illness/conditions that impairs at least one vital organ system and/or poses a high risk of imminent or life threatening deterioration in the patient's condition. It includes time spent evaluating and treating the patient's acute illness as well as time spent reviewing labs, radiology, discussing goals of care with patient and/or patient's family, and discussing the case with a multidisciplinary team, in an effort to prevent further life threatening deterioration or end organ damage. This time is independent of any procedures performed.

## 2023-10-06 NOTE — PROGRESS NOTE ADULT - SUBJECTIVE AND OBJECTIVE BOX
Follow-up Critical Care Progress Note  Chief Complaint : Pneumonia due to infectious organism    Remains on vasopressor support    Allergies :levofloxacin (Unknown)  alfuzosin (Unknown)  tamsulosin (Unknown)  Myrbetriq (Unknown)      PAST MEDICAL & SURGICAL HISTORY:  Chronic atrial fibrillation    History of BPH    CAD (coronary artery disease)    Coronary stent patent    ESRD on dialysis    History of GI bleed    Mitral valve replaced        Medications:  MEDICATIONS  (STANDING):  aspirin  chewable 81 milliGRAM(s) Oral daily  chlorhexidine 2% Cloths 1 Application(s) Topical <User Schedule>  epoetin val (PROCRIT) Injectable 88130 Unit(s) IV Push <User Schedule>  fludroCORTISONE 0.1 milliGRAM(s) Oral daily  folic acid 1 milliGRAM(s) Oral daily  hydrocortisone hemorrhoidal Suppository 1 Suppository(s) Rectal two times a day  influenza  Vaccine (HIGH DOSE) 0.7 milliLiter(s) IntraMuscular once  levothyroxine 137 MICROGram(s) Oral daily  mesalamine Suppository 1000 milliGRAM(s) Rectal at bedtime  midodrine 15 milliGRAM(s) Oral every 8 hours  mupirocin 2% Nasal 1 Application(s) Both Nostrils two times a day  norepinephrine Infusion 0.05 MICROgram(s)/kG/Min (6.81 mL/Hr) IV Continuous <Continuous>  pantoprazole    Tablet 40 milliGRAM(s) Oral before breakfast  polyethylene glycol 3350 17 Gram(s) Oral daily  senna 2 Tablet(s) Oral at bedtime    MEDICATIONS  (PRN):  acetaminophen     Tablet .. 650 milliGRAM(s) Oral every 6 hours PRN Mild Pain (1 - 3)  aluminum hydroxide/magnesium hydroxide/simethicone Suspension 10 milliLiter(s) Oral every 6 hours PRN dyspepsia      Antibiotics History  caspofungin IVPB 70 milliGRAM(s) IV Intermittent once, 10-01-23 @ 08:48  caspofungin IVPB    , 10-01-23 @ 08:49  caspofungin IVPB 50 milliGRAM(s) IV Intermittent every 24 hours, 10-02-23 @ 08:48  cefepime   IVPB 1000 milliGRAM(s) IV Intermittent once, 09-30-23 @ 19:12, Stop order after: 1 Doses  meropenem  IVPB 500 milliGRAM(s) IV Intermittent once, 10-01-23 @ 09:02, Stop order after: 1 Doses  meropenem  IVPB    , 10-01-23 @ 09:03  meropenem  IVPB 500 milliGRAM(s) IV Intermittent every 24 hours, 10-02-23 @ 09:02, Stop order after: 8 Days  piperacillin/tazobactam IVPB.. 3.375 Gram(s) IV Intermittent every 12 hours, 09-30-23 @ 22:40, Stop order after: 7 Days  vancomycin  IVPB    , 10-01-23 @ 09:00  vancomycin  IVPB 1000 milliGRAM(s) IV Intermittent once, 10-01-23 @ 09:00, Stop order after: 1 Doses  vancomycin  IVPB 1000 milliGRAM(s) IV Intermittent every 24 hours, 10-02-23 @ 09:00, Stop order after: 1 Doses  vancomycin  IVPB 1000 milliGRAM(s) IV Intermittent every 24 hours, 10-03-23 @ 00:00  vancomycin  IVPB 1000 milliGRAM(s) IV Intermittent once, 10-04-23 @ 08:29, Stop order after: 1 Doses  vancomycin  IVPB. 1000 milliGRAM(s) IV Intermittent once, 09-30-23 @ 19:35, Stop order after: 1 Doses      Heme Medications   aspirin  chewable 81 milliGRAM(s) Oral daily, 10-01-23 @ 12:50      GI Medications  aluminum hydroxide/magnesium hydroxide/simethicone Suspension 10 milliLiter(s) Oral every 6 hours, 10-01-23 @ 13:00, Routine PRN  mesalamine Suppository 1000 milliGRAM(s) Rectal at bedtime, 10-01-23 @ 12:49, Routine  pantoprazole    Tablet 40 milliGRAM(s) Oral before breakfast, 10-01-23 @ 08:30, Routine  polyethylene glycol 3350 17 Gram(s) Oral daily, 10-01-23 @ 12:36, Routine  senna 2 Tablet(s) Oral at bedtime, 10-01-23 @ 12:36, Routine        LABS:                        8.5    15.53 )-----------( 120      ( 06 Oct 2023 05:00 )             25.5     10-06    134<L>  |  100  |  78<H>  ----------------------------<  146<H>  3.7   |  25  |  5.26<H>    Ca    9.9      06 Oct 2023 05:00  Phos  2.5     10-06  Mg     1.8     10-06    TPro  5.4<L>  /  Alb  2.0<L>  /  TBili  0.6  /  DBili  x   /  AST  31  /  ALT  32  /  AlkPhos  331<H>  10-06    HIT ab -- 10-01 @ 09:40  HIT ab EIA --  D Dimer -2066            Urinalysis Basic - ( 06 Oct 2023 05:00 )    Color: x / Appearance: x / SG: x / pH: x  Gluc: 146 mg/dL / Ketone: x  / Bili: x / Urobili: x   Blood: x / Protein: x / Nitrite: x   Leuk Esterase: x / RBC: x / WBC x   Sq Epi: x / Non Sq Epi: x / Bacteria: x              CULTURES: (if applicable)    Culture - Blood (collected 10-04-23 @ 06:00)  Source: .Blood Blood  Gram Stain (10-05-23 @ 07:40):    Growth in aerobic bottle: Gram Positive Cocci in Clusters    Growth in anaerobic bottle: Gram Positive Cocci in Clusters  Preliminary Report (10-05-23 @ 22:31):    Growth in aerobic and anaerobic bottles: Staphylococcus aureus    See previous culture [id number]    Culture - Blood (collected 10-04-23 @ 06:00)  Source: .Blood Blood  Gram Stain (10-05-23 @ 08:18):    Growth in aerobic bottle: Gram Positive Cocci in Clusters    Growth in anaerobic bottle: Gram Positive Cocci in Clusters  Preliminary Report (10-05-23 @ 21:56):    Growth in aerobic and anaerobic bottles: Staphylococcus aureus    Culture - Catheter (collected 10-02-23 @ 16:21)  Source: .Catheter + MRSA Bacterimia Permacath Removal Aerobic/Anaerobic Right  IJ Permacath TIP  Final Report (10-05-23 @ 14:50):    Greater than or equal to 15 Colonies Methicillin Resistant Staphylococcus    aureus  Organism: Methicillin resistant Staphylococcus aureus (10-05-23 @ 14:50)  Organism: Methicillin resistant Staphylococcus aureus (10-05-23 @ 14:50)      Method Type: LEWIS      -  Ampicillin/Sulbactam: R 16/8      -  Cefazolin: R >16      -  Clindamycin: R >4      -  Daptomycin: S 1      -  Erythromycin: R >4      -  Gentamicin: S <=1 Should not be used as monotherapy      -  Linezolid: S 4      -  Oxacillin: R >2      -  Penicillin: R >8      -  Rifampin: S <=1 Should not be used as monotherapy      -  Tetracycline: S 2      -  Trimethoprim/Sulfamethoxazole: S <=0.5/9.5      -  Vancomycin: S 2    Culture - Blood (collected 09-30-23 @ 19:15)  Source: .Blood Blood-Peripheral  Gram Stain (10-01-23 @ 11:24):    Growth in anaerobic bottle: Gram Positive Cocci in Clusters    Growth in aerobic bottle: Gram Positive Cocci in Clusters  Final Report (10-03-23 @ 07:40):    Growth in aerobic and anaerobic bottles: Methicillin Resistant    Staphylococcus aureus    See previous culture  [id number]    Culture - Blood (collected 09-30-23 @ 19:10)  Source: .Blood Blood-Peripheral  Gram Stain (10-01-23 @ 11:02):    Growth in anaerobic bottle: Gram Positive Cocci in Clusters    Growth in aerobic bottle: Gram Positive Cocci in Clusters  Final Report (10-03-23 @ 07:39):    Growth in aerobic and anaerobic bottles: Methicillin Resistant    Staphylococcus aureus    Direct identification is available within approximately 3-5    hours either by Blood Panel Multiplexed PCR or Direct    MALDI-TOF. Details: [email or web address]  Organism: Blood Culture PCR  Methicillin resistant Staphylococcus aureus (10-03-23 @ 07:39)  Organism: Methicillin resistant Staphylococcus aureus (10-03-23 @ 07:39)      Method Type: LEWIS      -  Ampicillin/Sulbactam: R 16/8      -  Cefazolin: R >16      -  Clindamycin: R >4      -  Daptomycin: S 1      -  Erythromycin: R >4      -  Gentamicin: S <=1 Should not be used as monotherapy      -  Linezolid: S 4      -  Oxacillin: R >2      -  Penicillin: R >8      -  Rifampin: S <=1 Should not be used as monotherapy      -  Tetracycline: S 2      -  Trimethoprim/Sulfamethoxazole: S <=0.5/9.5      -  Vancomycin: S 2  Organism: Blood Culture PCR (10-03-23 @ 07:39)      Method Type: PCR      -  Methicillin resistant Staphylococcus aureus (MRSA): Detec      Rapid RVP Result: NotDetec (10-01-23 @ 15:45)        CAPILLARY BLOOD GLUCOSE          RADIOLOGY  CXR:      CT:    ECHO:      VITALS:  T(C): 37.1 (10-06-23 @ 05:30), Max: 37.1 (10-06-23 @ 05:30)  T(F): 98.8 (10-06-23 @ 05:30), Max: 98.8 (10-06-23 @ 05:30)  HR: 79 (10-06-23 @ 08:00) (67 - 122)  BP: 91/48 (10-06-23 @ 08:00) (81/54 - 138/87)  BP(mean): 62 (10-06-23 @ 08:00) (58 - 104)  ABP: --  ABP(mean): --  RR: 21 (10-06-23 @ 08:00) (10 - 23)  SpO2: 100% (10-06-23 @ 06:30) (89% - 100%)  CVP(mm Hg): --  CVP(cm H2O): --    Ins and Outs     10-05-23 @ 07:01  -  10-06-23 @ 07:00  --------------------------------------------------------  IN: 784.3 mL / OUT: 0 mL / NET: 784.3 mL                I&O's Detail    05 Oct 2023 07:01  -  06 Oct 2023 07:00  --------------------------------------------------------  IN:    Nepro: 710 mL    Norepinephrine: 74.3 mL  Total IN: 784.3 mL    OUT:  Total OUT: 0 mL    Total NET: 784.3 mL     Follow-up Critical Care Progress Note  Chief Complaint : Pneumonia due to infectious organism    Remains on vasopressor support    Allergies :levofloxacin (Unknown)  alfuzosin (Unknown)  tamsulosin (Unknown)  Myrbetriq (Unknown)      PAST MEDICAL & SURGICAL HISTORY:  Chronic atrial fibrillation    History of BPH    CAD (coronary artery disease)    Coronary stent patent    ESRD on dialysis    History of GI bleed    Mitral valve replaced        Medications:  MEDICATIONS  (STANDING):  aspirin  chewable 81 milliGRAM(s) Oral daily  chlorhexidine 2% Cloths 1 Application(s) Topical <User Schedule>  epoetin val (PROCRIT) Injectable 15538 Unit(s) IV Push <User Schedule>  fludroCORTISONE 0.1 milliGRAM(s) Oral daily  folic acid 1 milliGRAM(s) Oral daily  hydrocortisone hemorrhoidal Suppository 1 Suppository(s) Rectal two times a day  influenza  Vaccine (HIGH DOSE) 0.7 milliLiter(s) IntraMuscular once  levothyroxine 137 MICROGram(s) Oral daily  mesalamine Suppository 1000 milliGRAM(s) Rectal at bedtime  midodrine 15 milliGRAM(s) Oral every 8 hours  mupirocin 2% Nasal 1 Application(s) Both Nostrils two times a day  norepinephrine Infusion 0.05 MICROgram(s)/kG/Min (6.81 mL/Hr) IV Continuous <Continuous>  pantoprazole    Tablet 40 milliGRAM(s) Oral before breakfast  polyethylene glycol 3350 17 Gram(s) Oral daily  senna 2 Tablet(s) Oral at bedtime    MEDICATIONS  (PRN):  acetaminophen     Tablet .. 650 milliGRAM(s) Oral every 6 hours PRN Mild Pain (1 - 3)  aluminum hydroxide/magnesium hydroxide/simethicone Suspension 10 milliLiter(s) Oral every 6 hours PRN dyspepsia      Antibiotics History  caspofungin IVPB 70 milliGRAM(s) IV Intermittent once, 10-01-23 @ 08:48  caspofungin IVPB    , 10-01-23 @ 08:49  caspofungin IVPB 50 milliGRAM(s) IV Intermittent every 24 hours, 10-02-23 @ 08:48  cefepime   IVPB 1000 milliGRAM(s) IV Intermittent once, 09-30-23 @ 19:12, Stop order after: 1 Doses  meropenem  IVPB 500 milliGRAM(s) IV Intermittent once, 10-01-23 @ 09:02, Stop order after: 1 Doses  meropenem  IVPB    , 10-01-23 @ 09:03  meropenem  IVPB 500 milliGRAM(s) IV Intermittent every 24 hours, 10-02-23 @ 09:02, Stop order after: 8 Days  piperacillin/tazobactam IVPB.. 3.375 Gram(s) IV Intermittent every 12 hours, 09-30-23 @ 22:40, Stop order after: 7 Days  vancomycin  IVPB    , 10-01-23 @ 09:00  vancomycin  IVPB 1000 milliGRAM(s) IV Intermittent once, 10-01-23 @ 09:00, Stop order after: 1 Doses  vancomycin  IVPB 1000 milliGRAM(s) IV Intermittent every 24 hours, 10-02-23 @ 09:00, Stop order after: 1 Doses  vancomycin  IVPB 1000 milliGRAM(s) IV Intermittent every 24 hours, 10-03-23 @ 00:00  vancomycin  IVPB 1000 milliGRAM(s) IV Intermittent once, 10-04-23 @ 08:29, Stop order after: 1 Doses  vancomycin  IVPB. 1000 milliGRAM(s) IV Intermittent once, 09-30-23 @ 19:35, Stop order after: 1 Doses      Heme Medications   aspirin  chewable 81 milliGRAM(s) Oral daily, 10-01-23 @ 12:50      GI Medications  aluminum hydroxide/magnesium hydroxide/simethicone Suspension 10 milliLiter(s) Oral every 6 hours, 10-01-23 @ 13:00, Routine PRN  mesalamine Suppository 1000 milliGRAM(s) Rectal at bedtime, 10-01-23 @ 12:49, Routine  pantoprazole    Tablet 40 milliGRAM(s) Oral before breakfast, 10-01-23 @ 08:30, Routine  polyethylene glycol 3350 17 Gram(s) Oral daily, 10-01-23 @ 12:36, Routine  senna 2 Tablet(s) Oral at bedtime, 10-01-23 @ 12:36, Routine        LABS:                        8.5    15.53 )-----------( 120      ( 06 Oct 2023 05:00 )             25.5     10-06    134<L>  |  100  |  78<H>  ----------------------------<  146<H>  3.7   |  25  |  5.26<H>    Ca    9.9      06 Oct 2023 05:00  Phos  2.5     10-06  Mg     1.8     10-06    TPro  5.4<L>  /  Alb  2.0<L>  /  TBili  0.6  /  DBili  x   /  AST  31  /  ALT  32  /  AlkPhos  331<H>  10-06    HIT ab -- 10-01 @ 09:40  HIT ab EIA --  D Dimer -2066            Urinalysis Basic - ( 06 Oct 2023 05:00 )    Color: x / Appearance: x / SG: x / pH: x  Gluc: 146 mg/dL / Ketone: x  / Bili: x / Urobili: x   Blood: x / Protein: x / Nitrite: x   Leuk Esterase: x / RBC: x / WBC x   Sq Epi: x / Non Sq Epi: x / Bacteria: x              CULTURES: (if applicable)    Culture - Blood (collected 10-04-23 @ 06:00)  Source: .Blood Blood  Gram Stain (10-05-23 @ 07:40):    Growth in aerobic bottle: Gram Positive Cocci in Clusters    Growth in anaerobic bottle: Gram Positive Cocci in Clusters  Preliminary Report (10-05-23 @ 22:31):    Growth in aerobic and anaerobic bottles: Staphylococcus aureus    See previous culture [id number]    Culture - Blood (collected 10-04-23 @ 06:00)  Source: .Blood Blood  Gram Stain (10-05-23 @ 08:18):    Growth in aerobic bottle: Gram Positive Cocci in Clusters    Growth in anaerobic bottle: Gram Positive Cocci in Clusters  Preliminary Report (10-05-23 @ 21:56):    Growth in aerobic and anaerobic bottles: Staphylococcus aureus    Culture - Catheter (collected 10-02-23 @ 16:21)  Source: .Catheter + MRSA Bacterimia Permacath Removal Aerobic/Anaerobic Right  IJ Permacath TIP  Final Report (10-05-23 @ 14:50):    Greater than or equal to 15 Colonies Methicillin Resistant Staphylococcus    aureus  Organism: Methicillin resistant Staphylococcus aureus (10-05-23 @ 14:50)  Organism: Methicillin resistant Staphylococcus aureus (10-05-23 @ 14:50)      Method Type: LEWIS      -  Ampicillin/Sulbactam: R 16/8      -  Cefazolin: R >16      -  Clindamycin: R >4      -  Daptomycin: S 1      -  Erythromycin: R >4      -  Gentamicin: S <=1 Should not be used as monotherapy      -  Linezolid: S 4      -  Oxacillin: R >2      -  Penicillin: R >8      -  Rifampin: S <=1 Should not be used as monotherapy      -  Tetracycline: S 2      -  Trimethoprim/Sulfamethoxazole: S <=0.5/9.5      -  Vancomycin: S 2    Culture - Blood (collected 09-30-23 @ 19:15)  Source: .Blood Blood-Peripheral  Gram Stain (10-01-23 @ 11:24):    Growth in anaerobic bottle: Gram Positive Cocci in Clusters    Growth in aerobic bottle: Gram Positive Cocci in Clusters  Final Report (10-03-23 @ 07:40):    Growth in aerobic and anaerobic bottles: Methicillin Resistant    Staphylococcus aureus    See previous culture  [id number]    Culture - Blood (collected 09-30-23 @ 19:10)  Source: .Blood Blood-Peripheral  Gram Stain (10-01-23 @ 11:02):    Growth in anaerobic bottle: Gram Positive Cocci in Clusters    Growth in aerobic bottle: Gram Positive Cocci in Clusters  Final Report (10-03-23 @ 07:39):    Growth in aerobic and anaerobic bottles: Methicillin Resistant    Staphylococcus aureus    Direct identification is available within approximately 3-5    hours either by Blood Panel Multiplexed PCR or Direct    MALDI-TOF. Details: [email or web address]  Organism: Blood Culture PCR  Methicillin resistant Staphylococcus aureus (10-03-23 @ 07:39)  Organism: Methicillin resistant Staphylococcus aureus (10-03-23 @ 07:39)      Method Type: LEWIS      -  Ampicillin/Sulbactam: R 16/8      -  Cefazolin: R >16      -  Clindamycin: R >4      -  Daptomycin: S 1      -  Erythromycin: R >4      -  Gentamicin: S <=1 Should not be used as monotherapy      -  Linezolid: S 4      -  Oxacillin: R >2      -  Penicillin: R >8      -  Rifampin: S <=1 Should not be used as monotherapy      -  Tetracycline: S 2      -  Trimethoprim/Sulfamethoxazole: S <=0.5/9.5      -  Vancomycin: S 2  Organism: Blood Culture PCR (10-03-23 @ 07:39)      Method Type: PCR      -  Methicillin resistant Staphylococcus aureus (MRSA): Detec      Rapid RVP Result: NotDetec (10-01-23 @ 15:45)        CAPILLARY BLOOD GLUCOSE          RADIOLOGY  CXR:      CT:    ECHO:      VITALS:  T(C): 37.1 (10-06-23 @ 05:30), Max: 37.1 (10-06-23 @ 05:30)  T(F): 98.8 (10-06-23 @ 05:30), Max: 98.8 (10-06-23 @ 05:30)  HR: 79 (10-06-23 @ 08:00) (67 - 122)  BP: 91/48 (10-06-23 @ 08:00) (81/54 - 138/87)  BP(mean): 62 (10-06-23 @ 08:00) (58 - 104)  ABP: --  ABP(mean): --  RR: 21 (10-06-23 @ 08:00) (10 - 23)  SpO2: 100% (10-06-23 @ 06:30) (89% - 100%)  CVP(mm Hg): --  CVP(cm H2O): --    Ins and Outs     10-05-23 @ 07:01  -  10-06-23 @ 07:00  --------------------------------------------------------  IN: 784.3 mL / OUT: 0 mL / NET: 784.3 mL                I&O's Detail    05 Oct 2023 07:01  -  06 Oct 2023 07:00  --------------------------------------------------------  IN:    Nepro: 710 mL    Norepinephrine: 74.3 mL  Total IN: 784.3 mL    OUT:  Total OUT: 0 mL    Total NET: 784.3 mL     Follow-up Critical Care Progress Note  Chief Complaint : Pneumonia due to infectious organism    Remains on vasopressor support    Allergies :levofloxacin (Unknown)  alfuzosin (Unknown)  tamsulosin (Unknown)  Myrbetriq (Unknown)      PAST MEDICAL & SURGICAL HISTORY:  Chronic atrial fibrillation    History of BPH    CAD (coronary artery disease)    Coronary stent patent    ESRD on dialysis    History of GI bleed    Mitral valve replaced        Medications:  MEDICATIONS  (STANDING):  aspirin  chewable 81 milliGRAM(s) Oral daily  chlorhexidine 2% Cloths 1 Application(s) Topical <User Schedule>  epoetin val (PROCRIT) Injectable 85248 Unit(s) IV Push <User Schedule>  fludroCORTISONE 0.1 milliGRAM(s) Oral daily  folic acid 1 milliGRAM(s) Oral daily  hydrocortisone hemorrhoidal Suppository 1 Suppository(s) Rectal two times a day  influenza  Vaccine (HIGH DOSE) 0.7 milliLiter(s) IntraMuscular once  levothyroxine 137 MICROGram(s) Oral daily  mesalamine Suppository 1000 milliGRAM(s) Rectal at bedtime  midodrine 15 milliGRAM(s) Oral every 8 hours  mupirocin 2% Nasal 1 Application(s) Both Nostrils two times a day  norepinephrine Infusion 0.05 MICROgram(s)/kG/Min (6.81 mL/Hr) IV Continuous <Continuous>  pantoprazole    Tablet 40 milliGRAM(s) Oral before breakfast  polyethylene glycol 3350 17 Gram(s) Oral daily  senna 2 Tablet(s) Oral at bedtime    MEDICATIONS  (PRN):  acetaminophen     Tablet .. 650 milliGRAM(s) Oral every 6 hours PRN Mild Pain (1 - 3)  aluminum hydroxide/magnesium hydroxide/simethicone Suspension 10 milliLiter(s) Oral every 6 hours PRN dyspepsia      Antibiotics History  caspofungin IVPB 70 milliGRAM(s) IV Intermittent once, 10-01-23 @ 08:48  caspofungin IVPB    , 10-01-23 @ 08:49  caspofungin IVPB 50 milliGRAM(s) IV Intermittent every 24 hours, 10-02-23 @ 08:48  cefepime   IVPB 1000 milliGRAM(s) IV Intermittent once, 09-30-23 @ 19:12, Stop order after: 1 Doses  meropenem  IVPB 500 milliGRAM(s) IV Intermittent once, 10-01-23 @ 09:02, Stop order after: 1 Doses  meropenem  IVPB    , 10-01-23 @ 09:03  meropenem  IVPB 500 milliGRAM(s) IV Intermittent every 24 hours, 10-02-23 @ 09:02, Stop order after: 8 Days  piperacillin/tazobactam IVPB.. 3.375 Gram(s) IV Intermittent every 12 hours, 09-30-23 @ 22:40, Stop order after: 7 Days  vancomycin  IVPB    , 10-01-23 @ 09:00  vancomycin  IVPB 1000 milliGRAM(s) IV Intermittent once, 10-01-23 @ 09:00, Stop order after: 1 Doses  vancomycin  IVPB 1000 milliGRAM(s) IV Intermittent every 24 hours, 10-02-23 @ 09:00, Stop order after: 1 Doses  vancomycin  IVPB 1000 milliGRAM(s) IV Intermittent every 24 hours, 10-03-23 @ 00:00  vancomycin  IVPB 1000 milliGRAM(s) IV Intermittent once, 10-04-23 @ 08:29, Stop order after: 1 Doses  vancomycin  IVPB. 1000 milliGRAM(s) IV Intermittent once, 09-30-23 @ 19:35, Stop order after: 1 Doses      Heme Medications   aspirin  chewable 81 milliGRAM(s) Oral daily, 10-01-23 @ 12:50      GI Medications  aluminum hydroxide/magnesium hydroxide/simethicone Suspension 10 milliLiter(s) Oral every 6 hours, 10-01-23 @ 13:00, Routine PRN  mesalamine Suppository 1000 milliGRAM(s) Rectal at bedtime, 10-01-23 @ 12:49, Routine  pantoprazole    Tablet 40 milliGRAM(s) Oral before breakfast, 10-01-23 @ 08:30, Routine  polyethylene glycol 3350 17 Gram(s) Oral daily, 10-01-23 @ 12:36, Routine  senna 2 Tablet(s) Oral at bedtime, 10-01-23 @ 12:36, Routine        LABS:                        8.5    15.53 )-----------( 120      ( 06 Oct 2023 05:00 )             25.5     10-06    134<L>  |  100  |  78<H>  ----------------------------<  146<H>  3.7   |  25  |  5.26<H>    Ca    9.9      06 Oct 2023 05:00  Phos  2.5     10-06  Mg     1.8     10-06    TPro  5.4<L>  /  Alb  2.0<L>  /  TBili  0.6  /  DBili  x   /  AST  31  /  ALT  32  /  AlkPhos  331<H>  10-06    HIT ab -- 10-01 @ 09:40  HIT ab EIA --  D Dimer -2066            Urinalysis Basic - ( 06 Oct 2023 05:00 )    Color: x / Appearance: x / SG: x / pH: x  Gluc: 146 mg/dL / Ketone: x  / Bili: x / Urobili: x   Blood: x / Protein: x / Nitrite: x   Leuk Esterase: x / RBC: x / WBC x   Sq Epi: x / Non Sq Epi: x / Bacteria: x              CULTURES: (if applicable)    Culture - Blood (collected 10-04-23 @ 06:00)  Source: .Blood Blood  Gram Stain (10-05-23 @ 07:40):    Growth in aerobic bottle: Gram Positive Cocci in Clusters    Growth in anaerobic bottle: Gram Positive Cocci in Clusters  Preliminary Report (10-05-23 @ 22:31):    Growth in aerobic and anaerobic bottles: Staphylococcus aureus    See previous culture [id number]    Culture - Blood (collected 10-04-23 @ 06:00)  Source: .Blood Blood  Gram Stain (10-05-23 @ 08:18):    Growth in aerobic bottle: Gram Positive Cocci in Clusters    Growth in anaerobic bottle: Gram Positive Cocci in Clusters  Preliminary Report (10-05-23 @ 21:56):    Growth in aerobic and anaerobic bottles: Staphylococcus aureus    Culture - Catheter (collected 10-02-23 @ 16:21)  Source: .Catheter + MRSA Bacterimia Permacath Removal Aerobic/Anaerobic Right  IJ Permacath TIP  Final Report (10-05-23 @ 14:50):    Greater than or equal to 15 Colonies Methicillin Resistant Staphylococcus    aureus  Organism: Methicillin resistant Staphylococcus aureus (10-05-23 @ 14:50)  Organism: Methicillin resistant Staphylococcus aureus (10-05-23 @ 14:50)      Method Type: LEWIS      -  Ampicillin/Sulbactam: R 16/8      -  Cefazolin: R >16      -  Clindamycin: R >4      -  Daptomycin: S 1      -  Erythromycin: R >4      -  Gentamicin: S <=1 Should not be used as monotherapy      -  Linezolid: S 4      -  Oxacillin: R >2      -  Penicillin: R >8      -  Rifampin: S <=1 Should not be used as monotherapy      -  Tetracycline: S 2      -  Trimethoprim/Sulfamethoxazole: S <=0.5/9.5      -  Vancomycin: S 2    Culture - Blood (collected 09-30-23 @ 19:15)  Source: .Blood Blood-Peripheral  Gram Stain (10-01-23 @ 11:24):    Growth in anaerobic bottle: Gram Positive Cocci in Clusters    Growth in aerobic bottle: Gram Positive Cocci in Clusters  Final Report (10-03-23 @ 07:40):    Growth in aerobic and anaerobic bottles: Methicillin Resistant    Staphylococcus aureus    See previous culture  [id number]    Culture - Blood (collected 09-30-23 @ 19:10)  Source: .Blood Blood-Peripheral  Gram Stain (10-01-23 @ 11:02):    Growth in anaerobic bottle: Gram Positive Cocci in Clusters    Growth in aerobic bottle: Gram Positive Cocci in Clusters  Final Report (10-03-23 @ 07:39):    Growth in aerobic and anaerobic bottles: Methicillin Resistant    Staphylococcus aureus    Direct identification is available within approximately 3-5    hours either by Blood Panel Multiplexed PCR or Direct    MALDI-TOF. Details: [email or web address]  Organism: Blood Culture PCR  Methicillin resistant Staphylococcus aureus (10-03-23 @ 07:39)  Organism: Methicillin resistant Staphylococcus aureus (10-03-23 @ 07:39)      Method Type: LEWIS      -  Ampicillin/Sulbactam: R 16/8      -  Cefazolin: R >16      -  Clindamycin: R >4      -  Daptomycin: S 1      -  Erythromycin: R >4      -  Gentamicin: S <=1 Should not be used as monotherapy      -  Linezolid: S 4      -  Oxacillin: R >2      -  Penicillin: R >8      -  Rifampin: S <=1 Should not be used as monotherapy      -  Tetracycline: S 2      -  Trimethoprim/Sulfamethoxazole: S <=0.5/9.5      -  Vancomycin: S 2  Organism: Blood Culture PCR (10-03-23 @ 07:39)      Method Type: PCR      -  Methicillin resistant Staphylococcus aureus (MRSA): Detec      Rapid RVP Result: NotDetec (10-01-23 @ 15:45)        CAPILLARY BLOOD GLUCOSE          RADIOLOGY  CXR:      CT:    ECHO:      VITALS:  T(C): 37.1 (10-06-23 @ 05:30), Max: 37.1 (10-06-23 @ 05:30)  T(F): 98.8 (10-06-23 @ 05:30), Max: 98.8 (10-06-23 @ 05:30)  HR: 79 (10-06-23 @ 08:00) (67 - 122)  BP: 91/48 (10-06-23 @ 08:00) (81/54 - 138/87)  BP(mean): 62 (10-06-23 @ 08:00) (58 - 104)  ABP: --  ABP(mean): --  RR: 21 (10-06-23 @ 08:00) (10 - 23)  SpO2: 100% (10-06-23 @ 06:30) (89% - 100%)  CVP(mm Hg): --  CVP(cm H2O): --    Ins and Outs     10-05-23 @ 07:01  -  10-06-23 @ 07:00  --------------------------------------------------------  IN: 784.3 mL / OUT: 0 mL / NET: 784.3 mL                I&O's Detail    05 Oct 2023 07:01  -  06 Oct 2023 07:00  --------------------------------------------------------  IN:    Nepro: 710 mL    Norepinephrine: 74.3 mL  Total IN: 784.3 mL    OUT:  Total OUT: 0 mL    Total NET: 784.3 mL

## 2023-10-06 NOTE — PROGRESS NOTE ADULT - PROBLEM SELECTOR PLAN 1
Patient with thrombocytopenia clinically secondary to sepsis. Anemia associated with ESRD/chronic disease contributing. Platelet count currently improving. Epogen per nephrology. Hemoglobin currently stable. T/C PRBC if hemoglobin<7/related symptoms.

## 2023-10-06 NOTE — PROGRESS NOTE ADULT - ASSESSMENT
79 yo m pmhx Afib on Eliquis, CAD, MVR, ESRD on HD via tunneled catheter admitted with septic shock 2/2 PNA and MRSA bacteremia s/p tunneled HD cath removal by vascular, metabolic encephalopathy, thrombocytopenia.   Assessment:  1. Septic Shock  2. MRSA Bacteremia  3. ?Endocarditis  4. Metabolic Encephalopathy  5. Severe Protein-Calorie Malnutrition     Plan:  NEURO:   -Monitor mental status closely, avoid neurosuppresants. Serial neurologic assessments.     CV:  -Remains in vasopressor-dependent shock state w/ Levophed infusion - actively titrating to maintain goal MAP >65 monitoring end points of organ perfusion. Midodrine increased to 15mg q8h to offload IV vasopressor requirements and facilitate weaning off. Will give Albumin 25% 100cc q4h x2 now to further wean vasopressor requirements.   -Keep K~4 and Mg>2 for optimal arrhythmia suppression.  -TTE without vegetation. Poor candidate for ERENDIRA and not operative candidate per Cards.     PULM:   -Satting well on NC, will utilize supplemental O2 PRN to maintain goal SpO2 >88%.   -Incentive spirometry, Chest PT/Pulmonary toilet, HOB >30'.     GI:   -TF.   -Protonix QD.    RENAL:   -ESRD on HD.   -trend lytes/Scr daily with BMP  -I's and O's, goal UOP 0.5 cc/kg/hr  -renal dose meds and avoid nephrotoxins     ENDO:   -Aggressive glycemic control to limit FS glucose to <180mg/dl.   -Synthroid for hx of hypothyroidism, keep Euthyroid.     ID:   -Afebrile w/ leukocytosis. BloodCx w/ MRSA bacteremia s/p tunneled HD catheter, s/p removal. Vancomycin by level for abx. ID following, recs appreciated.  -ABX use and/or discontinuation based on discussion with ID in conjunction with clinical features, culture data, and judicious procalcitonin monitoring.    HEME:   -Thrombocytopenia improving, likely iso sepsis. No signs/sx of bleeding. Transfuse for Plt <10k or <50k w/ active bleeding.  -DVT ppx w/ SCDs.    GOC: Full Code.  79 yo m pmhx Afib on Eliquis, CAD, MVR, ESRD on HD via tunneled catheter admitted with septic shock 2/2 PNA and MRSA bacteremia s/p tunneled HD cath removal by vascular, metabolic encephalopathy, thrombocytopenia.   Assessment:  1. Septic Shock  2. MRSA Bacteremia  3. ?Endocarditis  4. Metabolic Encephalopathy  5. Severe Protein-Calorie Malnutrition     Plan:  NEURO:   -Monitor mental status closely, avoid neurosuppresants. Serial neurologic assessments.     CV:  -Remains in vasopressor-dependent shock state w/ Levophed infusion - actively titrating to maintain goal MAP >65 monitoring end points of organ perfusion. Midodrine increased to 15mg q8h to offload IV vasopressor requirements and facilitate weaning off. Will give Albumin 25% 100cc q4h x2 now to further wean vasopressor requirements.   -Keep K~4 and Mg>2 for optimal arrhythmia suppression.  -TTE without vegetation. Poor candidate for EERNDIRA and not operative candidate per Cards.     PULM:   -Satting well on NC, will utilize supplemental O2 PRN to maintain goal SpO2 >88%.   -Incentive spirometry, Chest PT/Pulmonary toilet, HOB >30'.     GI:   -TF.   -Protonix QD.    RENAL:   -ESRD on HD.   -trend lytes/Scr daily with BMP  -I's and O's, goal UOP 0.5 cc/kg/hr  -renal dose meds and avoid nephrotoxins     ENDO:   -Aggressive glycemic control to limit FS glucose to <180mg/dl.   -Synthroid for hx of hypothyroidism, keep Euthyroid.     ID:   -Afebrile w/ leukocytosis. BloodCx w/ MRSA bacteremia s/p tunneled HD catheter, s/p removal. Vancomycin by level for abx. ID following, recs appreciated.  -ABX use and/or discontinuation based on discussion with ID in conjunction with clinical features, culture data, and judicious procalcitonin monitoring.    HEME:   -Thrombocytopenia improving, likely iso sepsis. No signs/sx of bleeding. Transfuse for Plt <10k or <50k w/ active bleeding.  -DVT ppx w/ SCDs.    GOC: Full Code.  77 yo m pmhx Afib on Eliquis, CAD, MVR, ESRD on HD via tunneled catheter admitted with septic shock 2/2 PNA and MRSA bacteremia s/p tunneled HD cath removal by vascular, metabolic encephalopathy, thrombocytopenia.   Assessment:  1. Septic Shock  2. MRSA Bacteremia  3. ?Endocarditis  4. Metabolic Encephalopathy  5. Severe Protein-Calorie Malnutrition     Plan:  NEURO:   -Monitor mental status closely, avoid neurosuppresants. Serial neurologic assessments.     CV:  -Remains in vasopressor-dependent shock state w/ Levophed infusion - actively titrating to maintain goal MAP >65 monitoring end points of organ perfusion. Midodrine increased to 15mg q8h to offload IV vasopressor requirements and facilitate weaning off. Will give Albumin 25% 100cc q4h x2 now to further wean vasopressor requirements.   -Keep K~4 and Mg>2 for optimal arrhythmia suppression.  -TTE without vegetation. Poor candidate for ERENDIRA and not operative candidate per Cards.     PULM:   -Satting well on NC, will utilize supplemental O2 PRN to maintain goal SpO2 >88%.   -Incentive spirometry, Chest PT/Pulmonary toilet, HOB >30'.     GI:   -TF.   -Protonix QD.    RENAL:   -ESRD on HD.   -trend lytes/Scr daily with BMP  -I's and O's, goal UOP 0.5 cc/kg/hr  -renal dose meds and avoid nephrotoxins     ENDO:   -Aggressive glycemic control to limit FS glucose to <180mg/dl.   -Synthroid for hx of hypothyroidism, keep Euthyroid.     ID:   -Afebrile w/ leukocytosis. BloodCx w/ MRSA bacteremia s/p tunneled HD catheter, s/p removal. Vancomycin by level for abx. ID following, recs appreciated.  -ABX use and/or discontinuation based on discussion with ID in conjunction with clinical features, culture data, and judicious procalcitonin monitoring.    HEME:   -Thrombocytopenia improving, likely iso sepsis. No signs/sx of bleeding. Transfuse for Plt <10k or <50k w/ active bleeding.  -DVT ppx w/ SCDs.    GOC: Full Code.  79 yo m pmhx Afib on Eliquis, CAD, MVR, ESRD on HD via tunneled catheter admitted with septic shock 2/2 PNA and MRSA bacteremia s/p tunneled HD cath removal by vascular, metabolic encephalopathy, thrombocytopenia.   Assessment:  1. Septic Shock  2. MRSA Bacteremia  3. ?Endocarditis  4. Metabolic Encephalopathy  5. Severe Protein-Calorie Malnutrition     Plan:  NEURO:   -Monitor mental status closely, avoid neurosuppresants. Serial neurologic assessments.     CV:  -Remains in vasopressor-dependent shock state w/ Levophed infusion - actively titrating to maintain goal MAP >65 monitoring end points of organ perfusion. Midodrine increased to 15mg q8h to offload IV vasopressor requirements and facilitate weaning off. Will give Albumin 25% 100cc q4h x2 now to further wean vasopressor requirements.   -Keep K~4 and Mg>2 for optimal arrhythmia suppression.  -ASA QD.   -TTE without vegetation. Poor candidate for ERENDIRA and not operative candidate per Cards.     PULM:   -Satting well on NC, will utilize supplemental O2 PRN to maintain goal SpO2 >88%.   -Incentive spirometry, Chest PT/Pulmonary toilet, HOB >30'.     GI:   -TF.   -Protonix QD.    RENAL:   -ESRD on HD. S/p HD session today. Nephrology following, recs appreciated.   -trend lytes/Scr daily with BMP  -I's and O's, goal UOP 0.5 cc/kg/hr  -renal dose meds and avoid nephrotoxins     ENDO:   -Aggressive glycemic control to limit FS glucose to <180mg/dl.   -Synthroid for hx of hypothyroidism, keep Euthyroid.     ID:   -Afebrile w/ leukocytosis. BloodCx w/ MRSA bacteremia s/p tunneled HD catheter, s/p removal. Vancomycin by level for abx. ID following, recs appreciated.  -ABX use and/or discontinuation based on discussion with ID in conjunction with clinical features, culture data, and judicious procalcitonin monitoring.    HEME:   -Thrombocytopenia improving, likely iso sepsis. No signs/sx of bleeding. Transfuse for Plt <10k or <50k w/ active bleeding.  -DVT ppx w/ SCDs.    GOC: Full Code.  77 yo m pmhx Afib on Eliquis, CAD, MVR, ESRD on HD via tunneled catheter admitted with septic shock 2/2 PNA and MRSA bacteremia s/p tunneled HD cath removal by vascular, metabolic encephalopathy, thrombocytopenia.   Assessment:  1. Septic Shock  2. MRSA Bacteremia  3. ?Endocarditis  4. Metabolic Encephalopathy  5. Severe Protein-Calorie Malnutrition     Plan:  NEURO:   -Monitor mental status closely, avoid neurosuppresants. Serial neurologic assessments.     CV:  -Remains in vasopressor-dependent shock state w/ Levophed infusion - actively titrating to maintain goal MAP >65 monitoring end points of organ perfusion. Midodrine increased to 15mg q8h to offload IV vasopressor requirements and facilitate weaning off. Will give Albumin 25% 100cc q4h x2 now to further wean vasopressor requirements.   -Keep K~4 and Mg>2 for optimal arrhythmia suppression.  -ASA QD.   -TTE without vegetation. Poor candidate for ERENDIRA and not operative candidate per Cards.     PULM:   -Satting well on NC, will utilize supplemental O2 PRN to maintain goal SpO2 >88%.   -Incentive spirometry, Chest PT/Pulmonary toilet, HOB >30'.     GI:   -TF.   -Protonix QD.    RENAL:   -ESRD on HD. S/p HD session today. Nephrology following, recs appreciated.   -trend lytes/Scr daily with BMP  -I's and O's, goal UOP 0.5 cc/kg/hr  -renal dose meds and avoid nephrotoxins     ENDO:   -Aggressive glycemic control to limit FS glucose to <180mg/dl.   -Synthroid for hx of hypothyroidism, keep Euthyroid.     ID:   -Afebrile w/ leukocytosis. BloodCx w/ MRSA bacteremia s/p tunneled HD catheter, s/p removal. Vancomycin by level for abx. ID following, recs appreciated.  -ABX use and/or discontinuation based on discussion with ID in conjunction with clinical features, culture data, and judicious procalcitonin monitoring.    HEME:   -Thrombocytopenia improving, likely iso sepsis. No signs/sx of bleeding. Transfuse for Plt <10k or <50k w/ active bleeding.  -DVT ppx w/ SCDs.    GOC: Full Code.

## 2023-10-07 LAB
ALBUMIN SERPL ELPH-MCNC: 2.5 G/DL — LOW (ref 3.3–5)
ALP SERPL-CCNC: 260 U/L — HIGH (ref 40–120)
ALT FLD-CCNC: 26 U/L — SIGNIFICANT CHANGE UP (ref 10–45)
ANION GAP SERPL CALC-SCNC: 9 MMOL/L — SIGNIFICANT CHANGE UP (ref 5–17)
AST SERPL-CCNC: 28 U/L — SIGNIFICANT CHANGE UP (ref 10–40)
BILIRUB SERPL-MCNC: 0.7 MG/DL — SIGNIFICANT CHANGE UP (ref 0.2–1.2)
BUN SERPL-MCNC: 52 MG/DL — HIGH (ref 7–23)
CALCIUM SERPL-MCNC: 9.6 MG/DL — SIGNIFICANT CHANGE UP (ref 8.4–10.5)
CHLORIDE SERPL-SCNC: 102 MMOL/L — SIGNIFICANT CHANGE UP (ref 96–108)
CO2 SERPL-SCNC: 28 MMOL/L — SIGNIFICANT CHANGE UP (ref 22–31)
CREAT SERPL-MCNC: 3.93 MG/DL — HIGH (ref 0.5–1.3)
EGFR: 15 ML/MIN/1.73M2 — LOW
GLUCOSE SERPL-MCNC: 157 MG/DL — HIGH (ref 70–99)
GRAM STN FLD: SIGNIFICANT CHANGE UP
HCT VFR BLD CALC: 23.3 % — LOW (ref 39–50)
HGB BLD-MCNC: 7.7 G/DL — LOW (ref 13–17)
MAGNESIUM SERPL-MCNC: 1.6 MG/DL — SIGNIFICANT CHANGE UP (ref 1.6–2.6)
MCHC RBC-ENTMCNC: 30.6 PG — SIGNIFICANT CHANGE UP (ref 27–34)
MCHC RBC-ENTMCNC: 33 GM/DL — SIGNIFICANT CHANGE UP (ref 32–36)
MCV RBC AUTO: 92.5 FL — SIGNIFICANT CHANGE UP (ref 80–100)
NRBC # BLD: 0 /100 WBCS — SIGNIFICANT CHANGE UP (ref 0–0)
PHOSPHATE SERPL-MCNC: 2 MG/DL — LOW (ref 2.5–4.5)
PLATELET # BLD AUTO: 116 K/UL — LOW (ref 150–400)
POTASSIUM SERPL-MCNC: 3.5 MMOL/L — SIGNIFICANT CHANGE UP (ref 3.5–5.3)
POTASSIUM SERPL-SCNC: 3.5 MMOL/L — SIGNIFICANT CHANGE UP (ref 3.5–5.3)
PROT SERPL-MCNC: 5.6 G/DL — LOW (ref 6–8.3)
RBC # BLD: 2.52 M/UL — LOW (ref 4.2–5.8)
RBC # FLD: 16 % — HIGH (ref 10.3–14.5)
SODIUM SERPL-SCNC: 139 MMOL/L — SIGNIFICANT CHANGE UP (ref 135–145)
SPECIMEN SOURCE: SIGNIFICANT CHANGE UP
WBC # BLD: 15.48 K/UL — HIGH (ref 3.8–10.5)
WBC # FLD AUTO: 15.48 K/UL — HIGH (ref 3.8–10.5)

## 2023-10-07 RX ORDER — VANCOMYCIN HCL 1 G
750 VIAL (EA) INTRAVENOUS ONCE
Refills: 0 | Status: COMPLETED | OUTPATIENT
Start: 2023-10-07 | End: 2023-10-07

## 2023-10-07 RX ORDER — VANCOMYCIN HCL 1 G
750 VIAL (EA) INTRAVENOUS
Refills: 0 | Status: DISCONTINUED | OUTPATIENT
Start: 2023-10-09 | End: 2023-10-10

## 2023-10-07 RX ORDER — MAGNESIUM SULFATE 500 MG/ML
2 VIAL (ML) INJECTION ONCE
Refills: 0 | Status: DISCONTINUED | OUTPATIENT
Start: 2023-10-07 | End: 2023-10-07

## 2023-10-07 RX ORDER — POTASSIUM PHOSPHATE, MONOBASIC POTASSIUM PHOSPHATE, DIBASIC 236; 224 MG/ML; MG/ML
15 INJECTION, SOLUTION INTRAVENOUS ONCE
Refills: 0 | Status: DISCONTINUED | OUTPATIENT
Start: 2023-10-07 | End: 2023-10-07

## 2023-10-07 RX ORDER — HEPARIN SODIUM 5000 [USP'U]/ML
5000 INJECTION INTRAVENOUS; SUBCUTANEOUS EVERY 12 HOURS
Refills: 0 | Status: DISCONTINUED | OUTPATIENT
Start: 2023-10-07 | End: 2023-10-09

## 2023-10-07 RX ADMIN — HEPARIN SODIUM 5000 UNIT(S): 5000 INJECTION INTRAVENOUS; SUBCUTANEOUS at 18:13

## 2023-10-07 RX ADMIN — Medication 250 MILLIGRAM(S): at 09:34

## 2023-10-07 RX ADMIN — Medication 1 SUPPOSITORY(S): at 05:07

## 2023-10-07 RX ADMIN — MIDODRINE HYDROCHLORIDE 15 MILLIGRAM(S): 2.5 TABLET ORAL at 15:12

## 2023-10-07 RX ADMIN — MUPIROCIN 1 APPLICATION(S): 20 OINTMENT TOPICAL at 05:08

## 2023-10-07 RX ADMIN — Medication 1 MILLIGRAM(S): at 15:12

## 2023-10-07 RX ADMIN — Medication 137 MICROGRAM(S): at 05:07

## 2023-10-07 RX ADMIN — CHLORHEXIDINE GLUCONATE 1 APPLICATION(S): 213 SOLUTION TOPICAL at 05:07

## 2023-10-07 RX ADMIN — Medication 81 MILLIGRAM(S): at 15:12

## 2023-10-07 RX ADMIN — MUPIROCIN 1 APPLICATION(S): 20 OINTMENT TOPICAL at 18:14

## 2023-10-07 RX ADMIN — SENNA PLUS 2 TABLET(S): 8.6 TABLET ORAL at 21:06

## 2023-10-07 RX ADMIN — Medication 1 SUPPOSITORY(S): at 18:13

## 2023-10-07 RX ADMIN — PANTOPRAZOLE SODIUM 40 MILLIGRAM(S): 20 TABLET, DELAYED RELEASE ORAL at 15:12

## 2023-10-07 RX ADMIN — Medication 1000 MILLIGRAM(S): at 21:06

## 2023-10-07 NOTE — PROGRESS NOTE ADULT - ASSESSMENT
Physical Examination:  GENERAL:               Awake, No acute distress.    HEENT:                   No JVD, Moist MM, RIJ HD catheter  PULM:                     Bilateral air entry, No Rhonchi, No Wheezing  CVS:                         S1, S2,  + Murmur  ABD:                        Soft, nondistended, nontender, normoactive bowel sounds,   EXT:                         + bilateral upper extremity edema, nontender, No Cyanosis or Clubbing   Vascular:                 Warm Extremities, Normal Capillary refill, Normal Distal Pulses  NEURO:                  Awake, Arousable, follows simple commands  PSYC:                      Calm, Limited Insight.        Assessment  Septic shock, suspect line sepsis  MRSA blood stream infection  AMS- metabolic encephelopathy  Thrombocytopenia, suspect from Sepsis  Underlying h/o CAD s/p PCI, BPH, ESRD on HD, s/p MVR, Afib    Plan:  Slight mental status improving, suspect metabolic encephelopathy   Repeat blood cultures remains positive from 10/6  Cont. midodrine for now  antibiotics as per ID, dose vancomycin post HD MWF  Follow up regarding antibiotics management  Perm cath removed 10/3/2023   Thrombocytopenia improving  ID, Renal, Heme f/u  On going discussion with the NOK, concern for possibly infective endocarditis given patient not clearing bacteremia.    PMD:				                   Notified(Date):  Family Updated: 	Surekha BLISS  [phone], C  [phone](preferred line)                 Date: 10/7/2023      Sedation & Analgesia:	n/a  Diet/Nutrition:		Diet, Renal Restrictions: For patients receiving Renal Replacement - No Protein Restr, No Conc K, No Conc Phos, Low Sodium (09-30-23 @ 23:51) [Active]  GI PPx:			Protonix  DVT Ppx:		Restart heparin sq    	       Activity:		    Head of Bed:               35-45 Deg  Glycemic Control:        n/a    Lines:  PIV  CENTRAL LINE: 	[X] YES [ ] NO RIJ Shiley // Perm cath present on admission removed in or 10/2/2023            LOCATION:   	                       DATE INSERTED:   	                    REMOVE:  [ ] YES [X ] NO    A-LINE:  	                [ ] YES [ ] NO                      LOCATION:   	                       DATE INSERTED: 		            REMOVE:  [ ] YES [ ] NO    SU: 		        [ ] YES [ ] NO  		                                       DATE INSERTED:		            REMOVE:  [ ] YES [ ] NO      Restraints may deemed necessary to prevent removal of life-sustaining devices [ ] YES   [  x  ]  NO    Disposition:  ICU Care  Goals of Care: JULIANN from 8/30/23 Full code   Physical Examination:  GENERAL:               Awake, No acute distress.    HEENT:                   No JVD, Moist MM, RIJ HD catheter  PULM:                     Bilateral air entry, No Rhonchi, No Wheezing  CVS:                         S1, S2,  + Murmur  ABD:                        Soft, nondistended, nontender, normoactive bowel sounds,   EXT:                         + bilateral upper extremity edema, nontender, No Cyanosis or Clubbing   Vascular:                 Warm Extremities, Normal Capillary refill, Normal Distal Pulses  NEURO:                  Awake, Arousable, follows simple commands  PSYC:                      Calm, Limited Insight.        Assessment  Septic shock, suspect line sepsis  MRSA blood stream infection  AMS- metabolic encephelopathy  Thrombocytopenia, suspect from Sepsis  Underlying h/o CAD s/p PCI, BPH, ESRD on HD, s/p MVR, Afib    Plan:  Slight mental status improving, suspect metabolic encephelopathy   Repeat blood cultures remains positive from 10/6  Cont. midodrine for now  antibiotics as per ID, dose vancomycin post HD MWF  Follow up regarding antibiotics management  Perm cath removed 10/3/2023   Thrombocytopenia improving  ID, Renal, Heme f/u  On going discussion with the NOK, concern for possibly infective endocarditis given patient not clearing bacteremia.    PMD:				                   Notified(Date):  Family Updated: 	Surekha BLISS  [phone], C  [phone](preferred line)                 Date: 10/7/2023      Sedation & Analgesia:	n/a  Diet/Nutrition:		Diet, Renal Restrictions: For patients receiving Renal Replacement - No Protein Restr, No Conc K, No Conc Phos, Low Sodium (09-30-23 @ 23:51) [Active]  GI PPx:			Protonix  DVT Ppx:		Restart heparin sq    	       Activity:		    Head of Bed:               35-45 Deg  Glycemic Control:        n/a    Lines:  PIV  CENTRAL LINE: 	[X] YES [ ] NO RIJ Shiley // Perm cath present on admission removed in or 10/2/2023            LOCATION:   	                       DATE INSERTED:   	                    REMOVE:  [ ] YES [X ] NO    A-LINE:  	                [ ] YES [ ] NO                      LOCATION:   	                       DATE INSERTED: 		            REMOVE:  [ ] YES [ ] NO    SU: 		        [ ] YES [ ] NO  		                                       DATE INSERTED:		            REMOVE:  [ ] YES [ ] NO      Restraints may deemed necessary to prevent removal of life-sustaining devices [ ] YES   [  x  ]  NO    Disposition:  ICU Care  Goals of Care: JULIANN from 8/30/23 Full code   Physical Examination:  GENERAL:               Awake, No acute distress.    HEENT:                   No JVD, Moist MM, RIJ HD catheter  PULM:                     Bilateral air entry, No Rhonchi, No Wheezing  CVS:                         S1, S2,  + Murmur  ABD:                        Soft, nondistended, nontender, normoactive bowel sounds,   EXT:                         + bilateral upper extremity edema, nontender, No Cyanosis or Clubbing   Vascular:                 Warm Extremities, Normal Capillary refill, Normal Distal Pulses  NEURO:                  Awake, Arousable, follows simple commands  PSYC:                      Calm, Limited Insight.        Assessment  Septic shock, suspect line sepsis  MRSA blood stream infection  AMS- metabolic encephelopathy  Thrombocytopenia, suspect from Sepsis  Underlying h/o CAD s/p PCI, BPH, ESRD on HD, s/p MVR, Afib    Plan:  Slight mental status improving, suspect metabolic encephelopathy   Repeat blood cultures remains positive from 10/6  Cont. midodrine for now  antibiotics as per ID, dose vancomycin post HD MWF  Follow up regarding antibiotics management  Perm cath removed 10/3/2023   Thrombocytopenia improving  ID, Renal, Heme f/u  On going discussion with the NOK, concern for possibly infective endocarditis given patient not clearing bacteremia.    PMD:				                   Notified(Date):  Family Updated: 	Surekha BLISS  [phone], C  [phone](preferred line)                 Date: 10/7/2023      Sedation & Analgesia:	n/a  Diet/Nutrition:		Diet, Renal Restrictions: For patients receiving Renal Replacement - No Protein Restr, No Conc K, No Conc Phos, Low Sodium (09-30-23 @ 23:51) [Active]  GI PPx:			Protonix  DVT Ppx:		Restart heparin sq    	       Activity:		    Head of Bed:               35-45 Deg  Glycemic Control:        n/a    Lines:  PIV  CENTRAL LINE: 	[X] YES [ ] NO RIJ Shiley // Perm cath present on admission removed in or 10/2/2023            LOCATION:   	                       DATE INSERTED:   	                    REMOVE:  [ ] YES [X ] NO    A-LINE:  	                [ ] YES [ ] NO                      LOCATION:   	                       DATE INSERTED: 		            REMOVE:  [ ] YES [ ] NO    SU: 		        [ ] YES [ ] NO  		                                       DATE INSERTED:		            REMOVE:  [ ] YES [ ] NO      Restraints may deemed necessary to prevent removal of life-sustaining devices [ ] YES   [  x  ]  NO    Disposition:  ICU Care  Goals of Care: JULIANN from 8/30/23 Full code

## 2023-10-07 NOTE — PROGRESS NOTE ADULT - SUBJECTIVE AND OBJECTIVE BOX
NEPHROLOGY PROGRESS NOTE    CHIEF COMPLAINT:  ESRD    HPI:    ROS:    EXAM:  Vital Signs Last 24 Hrs  T(C): 36.7 (07 Oct 2023 05:00), Max: 36.9 (07 Oct 2023 01:00)  T(F): 98 (07 Oct 2023 05:00), Max: 98.4 (07 Oct 2023 01:00)  HR: 92 (07 Oct 2023 07:00) (77 - 125)  BP: 107/60 (07 Oct 2023 07:00) (90/49 - 138/61)  BP(mean): 74 (07 Oct 2023 07:00) (55 - 107)  RR: 15 (07 Oct 2023 07:00) (6 - 26)  SpO2: 98% (07 Oct 2023 07:00) (96% - 100%)    Parameters below as of 06 Oct 2023 19:00  Patient On (Oxygen Delivery Method): nasal cannula, 2Lnasal cannula      I&O's Summary    06 Oct 2023 07:01  -  07 Oct 2023 07:00  --------------------------------------------------------  IN: 1656.8 mL / OUT: 2400 mL / NET: -743.2 mL      Daily     Daily Weight in k.7 (07 Oct 2023 05:00)    Conversant, in no apparent distress  Normal respiratory effort, lungs clear bilaterally  Heart RRR with no murmur, generalized edema    LABS                        7.7    15.48 )-----------( 116      ( 07 Oct 2023 06:00 )             23.3     10-    139  |  102  |  52<H>  ----------------------------<  157<H>  3.5   |  28  |  3.93<H>    Ca    9.6      07 Oct 2023 06:00  Phos  2.0     10-  Mg     1.6     10-07    TPro  5.6<L>  /  Alb  2.5<L>  /  TBili  0.7  /  DBili  x   /  AST  28  /  ALT  26  /  AlkPhos  260<H>  1007      A/P:  ESRD on HD at [facility] MWF via perm cath, Afib, CM, EF 45 - 50%  Has LUE AVF not yet mature   Adm 23 w/MRSA bacteremia, persistent   Perm cath d/c-d 10/2/23 and temp line placed 10/4  Dialyzed yesterday with 1.6 liters off, will observe off HD over weekend unless clinical circumstances change               NEPHROLOGY PROGRESS NOTE    CHIEF COMPLAINT:  ESRD    HPI:    ROS:    EXAM:  Vital Signs Last 24 Hrs  T(C): 36.7 (07 Oct 2023 05:00), Max: 36.9 (07 Oct 2023 01:00)  T(F): 98 (07 Oct 2023 05:00), Max: 98.4 (07 Oct 2023 01:00)  HR: 92 (07 Oct 2023 07:00) (77 - 125)  BP: 107/60 (07 Oct 2023 07:00) (90/49 - 138/61)  BP(mean): 74 (07 Oct 2023 07:00) (55 - 107)  RR: 15 (07 Oct 2023 07:00) (6 - 26)  SpO2: 98% (07 Oct 2023 07:00) (96% - 100%)    Parameters below as of 06 Oct 2023 19:00  Patient On (Oxygen Delivery Method): nasal cannula, 2Lnasal cannula      I&O's Summary    06 Oct 2023 07:01  -  07 Oct 2023 07:00  --------------------------------------------------------  IN: 1656.8 mL / OUT: 2400 mL / NET: -743.2 mL      Daily     Daily Weight in k.7 (07 Oct 2023 05:00)    Conversant, in no apparent distress  Normal respiratory effort, lungs clear bilaterally  Heart RRR with no murmur, generalized edema    LABS                        7.7    15.48 )-----------( 116      ( 07 Oct 2023 06:00 )             23.3     10-    139  |  102  |  52<H>  ----------------------------<  157<H>  3.5   |  28  |  3.93<H>    Ca    9.6      07 Oct 2023 06:00  Phos  2.0     10-  Mg     1.6     10-07    TPro  5.6<L>  /  Alb  2.5<L>  /  TBili  0.7  /  DBili  x   /  AST  28  /  ALT  26  /  AlkPhos  260<H>  1007      A/P:  ESRD on HD at [facility] MWF via perm cath, Afib, CM, EF 45 - 50%  Has LUE AVF not yet mature   Adm 23 w/MRSA bacteremia, persistent   Perm cath d/c-d 10/2/23 and temp line placed 10/4  Dialyzed yesterday with 1.6 liters off, will observe off HD over weekend unless clinical circumstances change               NEPHROLOGY PROGRESS NOTE    CHIEF COMPLAINT:  ESRD    HPI:    ROS:    EXAM:  Vital Signs Last 24 Hrs  T(C): 36.7 (07 Oct 2023 05:00), Max: 36.9 (07 Oct 2023 01:00)  T(F): 98 (07 Oct 2023 05:00), Max: 98.4 (07 Oct 2023 01:00)  HR: 92 (07 Oct 2023 07:00) (77 - 125)  BP: 107/60 (07 Oct 2023 07:00) (90/49 - 138/61)  BP(mean): 74 (07 Oct 2023 07:00) (55 - 107)  RR: 15 (07 Oct 2023 07:00) (6 - 26)  SpO2: 98% (07 Oct 2023 07:00) (96% - 100%)    Parameters below as of 06 Oct 2023 19:00  Patient On (Oxygen Delivery Method): nasal cannula, 2Lnasal cannula      I&O's Summary    06 Oct 2023 07:01  -  07 Oct 2023 07:00  --------------------------------------------------------  IN: 1656.8 mL / OUT: 2400 mL / NET: -743.2 mL      Daily     Daily Weight in k.7 (07 Oct 2023 05:00)    Conversant, in no apparent distress  Normal respiratory effort, lungs clear bilaterally  Heart RRR with no murmur, generalized edema    LABS                        7.7    15.48 )-----------( 116      ( 07 Oct 2023 06:00 )             23.3     10-    139  |  102  |  52<H>  ----------------------------<  157<H>  3.5   |  28  |  3.93<H>    Ca    9.6      07 Oct 2023 06:00  Phos  2.0     10-  Mg     1.6     10-07    TPro  5.6<L>  /  Alb  2.5<L>  /  TBili  0.7  /  DBili  x   /  AST  28  /  ALT  26  /  AlkPhos  260<H>  1007      A/P:  ESRD on HD at [facility] MWF via perm cath, Afib, CM, EF 45 - 50%  Has LUE AVF not yet mature   Adm 23 w/MRSA bacteremia, persistent   Perm cath d/c-d 10/2/23 and temp line placed 10/4  Dialyzed yesterday with 1.6 liters off, will observe off HD over weekend unless clinical circumstances change

## 2023-10-07 NOTE — PROGRESS NOTE ADULT - SUBJECTIVE AND OBJECTIVE BOX
Follow-up Critical Care Progress Note  Chief Complaint : Pneumonia due to infectious organism    Off vasopressors this morning. Denies any pain.     Allergies :levofloxacin (Unknown)  alfuzosin (Unknown)  tamsulosin (Unknown)  Myrbetriq (Unknown)      PAST MEDICAL & SURGICAL HISTORY:  Chronic atrial fibrillation    History of BPH    CAD (coronary artery disease)    Coronary stent patent    ESRD on dialysis    History of GI bleed    Mitral valve replaced        Medications:  MEDICATIONS  (STANDING):  aspirin  chewable 81 milliGRAM(s) Oral daily  chlorhexidine 2% Cloths 1 Application(s) Topical <User Schedule>  epoetin val (PROCRIT) Injectable 57280 Unit(s) IV Push <User Schedule>  folic acid 1 milliGRAM(s) Oral daily  hydrocortisone hemorrhoidal Suppository 1 Suppository(s) Rectal two times a day  influenza  Vaccine (HIGH DOSE) 0.7 milliLiter(s) IntraMuscular once  levothyroxine 137 MICROGram(s) Oral daily  mesalamine Suppository 1000 milliGRAM(s) Rectal at bedtime  midodrine 15 milliGRAM(s) Oral every 8 hours  mupirocin 2% Nasal 1 Application(s) Both Nostrils two times a day  pantoprazole   Suspension 40 milliGRAM(s) Enteral Tube daily  polyethylene glycol 3350 17 Gram(s) Oral daily  senna 2 Tablet(s) Oral at bedtime    MEDICATIONS  (PRN):  acetaminophen     Tablet .. 650 milliGRAM(s) Oral every 6 hours PRN Mild Pain (1 - 3)  aluminum hydroxide/magnesium hydroxide/simethicone Suspension 10 milliLiter(s) Oral every 6 hours PRN dyspepsia  sodium chloride 0.9% lock flush 10 milliLiter(s) IV Push every 1 hour PRN Pre/post blood products, medications, blood draw, and to maintain line patency      Antibiotics History  caspofungin IVPB    , 10-01-23 @ 08:49  caspofungin IVPB 50 milliGRAM(s) IV Intermittent every 24 hours, 10-02-23 @ 08:48  caspofungin IVPB 70 milliGRAM(s) IV Intermittent once, 10-01-23 @ 08:48  cefepime   IVPB 1000 milliGRAM(s) IV Intermittent once, 09-30-23 @ 19:12, Stop order after: 1 Doses  meropenem  IVPB 500 milliGRAM(s) IV Intermittent once, 10-01-23 @ 09:02, Stop order after: 1 Doses  meropenem  IVPB 500 milliGRAM(s) IV Intermittent every 24 hours, 10-02-23 @ 09:02, Stop order after: 8 Days  meropenem  IVPB    , 10-01-23 @ 09:03  piperacillin/tazobactam IVPB.. 3.375 Gram(s) IV Intermittent every 12 hours, 09-30-23 @ 22:40, Stop order after: 7 Days  vancomycin  IVPB    , 10-01-23 @ 09:00  vancomycin  IVPB 1000 milliGRAM(s) IV Intermittent once, 10-04-23 @ 08:29, Stop order after: 1 Doses  vancomycin  IVPB 1000 milliGRAM(s) IV Intermittent every 24 hours, 10-03-23 @ 00:00  vancomycin  IVPB 1000 milliGRAM(s) IV Intermittent once, 10-01-23 @ 09:00, Stop order after: 1 Doses  vancomycin  IVPB 1000 milliGRAM(s) IV Intermittent every 24 hours, 10-02-23 @ 09:00, Stop order after: 1 Doses  vancomycin  IVPB 750 milliGRAM(s) IV Intermittent once, 10-07-23 @ 08:15, Stop order after: 1 Doses  vancomycin  IVPB. 1000 milliGRAM(s) IV Intermittent once, 09-30-23 @ 19:35, Stop order after: 1 Doses      Heme Medications   aspirin  chewable 81 milliGRAM(s) Oral daily, 10-01-23 @ 12:50      GI Medications  aluminum hydroxide/magnesium hydroxide/simethicone Suspension 10 milliLiter(s) Oral every 6 hours, 10-01-23 @ 13:00, Routine PRN  mesalamine Suppository 1000 milliGRAM(s) Rectal at bedtime, 10-01-23 @ 12:49, Routine  pantoprazole   Suspension 40 milliGRAM(s) Enteral Tube daily, 10-06-23 @ 09:28, Routine  polyethylene glycol 3350 17 Gram(s) Oral daily, 10-01-23 @ 12:36, Routine  senna 2 Tablet(s) Oral at bedtime, 10-01-23 @ 12:36, Routine        LABS:                        7.7    15.48 )-----------( 116      ( 07 Oct 2023 06:00 )             23.3     10-07    139  |  102  |  52<H>  ----------------------------<  157<H>  3.5   |  28  |  3.93<H>    Ca    9.6      07 Oct 2023 06:00  Phos  2.0     10-07  Mg     1.6     10-07    TPro  5.6<L>  /  Alb  2.5<L>  /  TBili  0.7  /  DBili  x   /  AST  28  /  ALT  26  /  AlkPhos  260<H>  10-07    HIT ab -- 10-01 @ 09:40  HIT ab EIA --  D Dimer -2066            Urinalysis Basic - ( 07 Oct 2023 06:00 )    Color: x / Appearance: x / SG: x / pH: x  Gluc: 157 mg/dL / Ketone: x  / Bili: x / Urobili: x   Blood: x / Protein: x / Nitrite: x   Leuk Esterase: x / RBC: x / WBC x   Sq Epi: x / Non Sq Epi: x / Bacteria: x              CULTURES: (if applicable)    Culture - Blood (collected 10-06-23 @ 10:20)  Source: .Blood Blood-Peripheral  Gram Stain (10-07-23 @ 04:46):    Growth in aerobic bottle: Gram Positive Cocci in Clusters    Growth in anaerobic bottle: Gram Positive Cocci in Clusters  Preliminary Report (10-07-23 @ 04:46):    Growth in aerobic bottle: Gram Positive Cocci in Clusters    Growth in anaerobic bottle: Gram Positive Cocci in Clusters    Culture - Blood (collected 10-04-23 @ 06:00)  Source: .Blood Blood  Gram Stain (10-05-23 @ 08:18):    Growth in aerobic bottle: Gram Positive Cocci in Clusters    Growth in anaerobic bottle: Gram Positive Cocci in Clusters  Final Report (10-06-23 @ 14:47):    Growth in aerobic and anaerobic bottles: Methicillin Resistant    Staphylococcus aureus  Organism: Methicillin resistant Staphylococcus aureus (10-06-23 @ 14:47)  Organism: Methicillin resistant Staphylococcus aureus (10-06-23 @ 14:47)      -  Clindamycin: R >4      -  Oxacillin: R >2      -  Gentamicin: S <=1 Should not be used as monotherapy      -  Daptomycin: S 1      -  Linezolid: S 2      -  Cefazolin: R >16      -  Vancomycin: S 2      -  Tetracycline: S 2      Method Type: LEWIS      -  Ampicillin/Sulbactam: R <=8/4      -  Penicillin: R 8      -  Rifampin: S <=1 Should not be used as monotherapy      -  Erythromycin: R >4      -  Trimethoprim/Sulfamethoxazole: S <=0.5/9.5    Culture - Blood (collected 10-04-23 @ 06:00)  Source: .Blood Blood  Gram Stain (10-05-23 @ 07:40):    Growth in aerobic bottle: Gram Positive Cocci in Clusters    Growth in anaerobic bottle: Gram Positive Cocci in Clusters  Final Report (10-06-23 @ 14:49):    Growth in aerobic and anaerobic bottles: Methicillin Resistant    Staphylococcus aureus    See previous culture [id number]    Culture - Catheter (collected 10-02-23 @ 16:21)  Source: .Catheter + MRSA Bacterimia Permacath Removal Aerobic/Anaerobic Right  IJ Permacath TIP  Final Report (10-05-23 @ 14:50):    Greater than or equal to 15 Colonies Methicillin Resistant Staphylococcus    aureus  Organism: Methicillin resistant Staphylococcus aureus (10-05-23 @ 14:50)  Organism: Methicillin resistant Staphylococcus aureus (10-05-23 @ 14:50)      -  Clindamycin: R >4      -  Oxacillin: R >2      -  Gentamicin: S <=1 Should not be used as monotherapy      -  Daptomycin: S 1      -  Linezolid: S 4      -  Cefazolin: R >16      -  Vancomycin: S 2      -  Tetracycline: S 2      Method Type: LEWIS      -  Ampicillin/Sulbactam: R 16/8      -  Penicillin: R >8      -  Rifampin: S <=1 Should not be used as monotherapy      -  Erythromycin: R >4      -  Trimethoprim/Sulfamethoxazole: S <=0.5/9.5    Culture - Blood (collected 09-30-23 @ 19:15)  Source: .Blood Blood-Peripheral  Gram Stain (10-01-23 @ 11:24):    Growth in anaerobic bottle: Gram Positive Cocci in Clusters    Growth in aerobic bottle: Gram Positive Cocci in Clusters  Final Report (10-03-23 @ 07:40):    Growth in aerobic and anaerobic bottles: Methicillin Resistant    Staphylococcus aureus    See previous culture  [id number]    Culture - Blood (collected 09-30-23 @ 19:10)  Source: .Blood Blood-Peripheral  Gram Stain (10-01-23 @ 11:02):    Growth in anaerobic bottle: Gram Positive Cocci in Clusters    Growth in aerobic bottle: Gram Positive Cocci in Clusters  Final Report (10-03-23 @ 07:39):    Growth in aerobic and anaerobic bottles: Methicillin Resistant    Staphylococcus aureus    Direct identification is available within approximately 3-5    hours either by Blood Panel Multiplexed PCR or Direct    MALDI-TOF. Details: [email or web address]  Organism: Blood Culture PCR  Methicillin resistant Staphylococcus aureus (10-03-23 @ 07:39)  Organism: Methicillin resistant Staphylococcus aureus (10-03-23 @ 07:39)      -  Clindamycin: R >4      -  Oxacillin: R >2      -  Gentamicin: S <=1 Should not be used as monotherapy      -  Daptomycin: S 1      -  Linezolid: S 4      -  Cefazolin: R >16      -  Vancomycin: S 2      -  Tetracycline: S 2      Method Type: LEWIS      -  Ampicillin/Sulbactam: R 16/8      -  Penicillin: R >8      -  Rifampin: S <=1 Should not be used as monotherapy      -  Erythromycin: R >4      -  Trimethoprim/Sulfamethoxazole: S <=0.5/9.5  Organism: Blood Culture PCR (10-03-23 @ 07:39)      Method Type: PCR      -  Methicillin resistant Staphylococcus aureus (MRSA): Detec      Rapid RVP Result: NotDetec (10-01-23 @ 15:45)        CAPILLARY BLOOD GLUCOSE          RADIOLOGY  CXR:      CT:    ECHO:      VITALS:  T(C): 36.7 (10-07-23 @ 05:00), Max: 36.9 (10-07-23 @ 01:00)  T(F): 98 (10-07-23 @ 05:00), Max: 98.4 (10-07-23 @ 01:00)  HR: 92 (10-07-23 @ 07:00) (77 - 125)  BP: 107/60 (10-07-23 @ 07:00) (90/49 - 138/61)  BP(mean): 74 (10-07-23 @ 07:00) (55 - 107)  ABP: --  ABP(mean): --  RR: 15 (10-07-23 @ 07:00) (6 - 26)  SpO2: 98% (10-07-23 @ 07:00) (96% - 100%)  CVP(mm Hg): --  CVP(cm H2O): --    Ins and Outs     10-06-23 @ 07:01  -  10-07-23 @ 07:00  --------------------------------------------------------  IN: 1656.8 mL / OUT: 2400 mL / NET: -743.2 mL                I&O's Detail    06 Oct 2023 07:01  -  07 Oct 2023 07:00  --------------------------------------------------------  IN:    Albumin 5% - 50 mL: 200 mL    Nepro: 650 mL    Norepinephrine: 6.8 mL    Other (mL): 800 mL  Total IN: 1656.8 mL    OUT:    Other (mL): 2400 mL  Total OUT: 2400 mL    Total NET: -743.2 mL   Follow-up Critical Care Progress Note  Chief Complaint : Pneumonia due to infectious organism    Off vasopressors this morning. Denies any pain.     Allergies :levofloxacin (Unknown)  alfuzosin (Unknown)  tamsulosin (Unknown)  Myrbetriq (Unknown)      PAST MEDICAL & SURGICAL HISTORY:  Chronic atrial fibrillation    History of BPH    CAD (coronary artery disease)    Coronary stent patent    ESRD on dialysis    History of GI bleed    Mitral valve replaced        Medications:  MEDICATIONS  (STANDING):  aspirin  chewable 81 milliGRAM(s) Oral daily  chlorhexidine 2% Cloths 1 Application(s) Topical <User Schedule>  epoetin val (PROCRIT) Injectable 44400 Unit(s) IV Push <User Schedule>  folic acid 1 milliGRAM(s) Oral daily  hydrocortisone hemorrhoidal Suppository 1 Suppository(s) Rectal two times a day  influenza  Vaccine (HIGH DOSE) 0.7 milliLiter(s) IntraMuscular once  levothyroxine 137 MICROGram(s) Oral daily  mesalamine Suppository 1000 milliGRAM(s) Rectal at bedtime  midodrine 15 milliGRAM(s) Oral every 8 hours  mupirocin 2% Nasal 1 Application(s) Both Nostrils two times a day  pantoprazole   Suspension 40 milliGRAM(s) Enteral Tube daily  polyethylene glycol 3350 17 Gram(s) Oral daily  senna 2 Tablet(s) Oral at bedtime    MEDICATIONS  (PRN):  acetaminophen     Tablet .. 650 milliGRAM(s) Oral every 6 hours PRN Mild Pain (1 - 3)  aluminum hydroxide/magnesium hydroxide/simethicone Suspension 10 milliLiter(s) Oral every 6 hours PRN dyspepsia  sodium chloride 0.9% lock flush 10 milliLiter(s) IV Push every 1 hour PRN Pre/post blood products, medications, blood draw, and to maintain line patency      Antibiotics History  caspofungin IVPB    , 10-01-23 @ 08:49  caspofungin IVPB 50 milliGRAM(s) IV Intermittent every 24 hours, 10-02-23 @ 08:48  caspofungin IVPB 70 milliGRAM(s) IV Intermittent once, 10-01-23 @ 08:48  cefepime   IVPB 1000 milliGRAM(s) IV Intermittent once, 09-30-23 @ 19:12, Stop order after: 1 Doses  meropenem  IVPB 500 milliGRAM(s) IV Intermittent once, 10-01-23 @ 09:02, Stop order after: 1 Doses  meropenem  IVPB 500 milliGRAM(s) IV Intermittent every 24 hours, 10-02-23 @ 09:02, Stop order after: 8 Days  meropenem  IVPB    , 10-01-23 @ 09:03  piperacillin/tazobactam IVPB.. 3.375 Gram(s) IV Intermittent every 12 hours, 09-30-23 @ 22:40, Stop order after: 7 Days  vancomycin  IVPB    , 10-01-23 @ 09:00  vancomycin  IVPB 1000 milliGRAM(s) IV Intermittent once, 10-04-23 @ 08:29, Stop order after: 1 Doses  vancomycin  IVPB 1000 milliGRAM(s) IV Intermittent every 24 hours, 10-03-23 @ 00:00  vancomycin  IVPB 1000 milliGRAM(s) IV Intermittent once, 10-01-23 @ 09:00, Stop order after: 1 Doses  vancomycin  IVPB 1000 milliGRAM(s) IV Intermittent every 24 hours, 10-02-23 @ 09:00, Stop order after: 1 Doses  vancomycin  IVPB 750 milliGRAM(s) IV Intermittent once, 10-07-23 @ 08:15, Stop order after: 1 Doses  vancomycin  IVPB. 1000 milliGRAM(s) IV Intermittent once, 09-30-23 @ 19:35, Stop order after: 1 Doses      Heme Medications   aspirin  chewable 81 milliGRAM(s) Oral daily, 10-01-23 @ 12:50      GI Medications  aluminum hydroxide/magnesium hydroxide/simethicone Suspension 10 milliLiter(s) Oral every 6 hours, 10-01-23 @ 13:00, Routine PRN  mesalamine Suppository 1000 milliGRAM(s) Rectal at bedtime, 10-01-23 @ 12:49, Routine  pantoprazole   Suspension 40 milliGRAM(s) Enteral Tube daily, 10-06-23 @ 09:28, Routine  polyethylene glycol 3350 17 Gram(s) Oral daily, 10-01-23 @ 12:36, Routine  senna 2 Tablet(s) Oral at bedtime, 10-01-23 @ 12:36, Routine        LABS:                        7.7    15.48 )-----------( 116      ( 07 Oct 2023 06:00 )             23.3     10-07    139  |  102  |  52<H>  ----------------------------<  157<H>  3.5   |  28  |  3.93<H>    Ca    9.6      07 Oct 2023 06:00  Phos  2.0     10-07  Mg     1.6     10-07    TPro  5.6<L>  /  Alb  2.5<L>  /  TBili  0.7  /  DBili  x   /  AST  28  /  ALT  26  /  AlkPhos  260<H>  10-07    HIT ab -- 10-01 @ 09:40  HIT ab EIA --  D Dimer -2066            Urinalysis Basic - ( 07 Oct 2023 06:00 )    Color: x / Appearance: x / SG: x / pH: x  Gluc: 157 mg/dL / Ketone: x  / Bili: x / Urobili: x   Blood: x / Protein: x / Nitrite: x   Leuk Esterase: x / RBC: x / WBC x   Sq Epi: x / Non Sq Epi: x / Bacteria: x              CULTURES: (if applicable)    Culture - Blood (collected 10-06-23 @ 10:20)  Source: .Blood Blood-Peripheral  Gram Stain (10-07-23 @ 04:46):    Growth in aerobic bottle: Gram Positive Cocci in Clusters    Growth in anaerobic bottle: Gram Positive Cocci in Clusters  Preliminary Report (10-07-23 @ 04:46):    Growth in aerobic bottle: Gram Positive Cocci in Clusters    Growth in anaerobic bottle: Gram Positive Cocci in Clusters    Culture - Blood (collected 10-04-23 @ 06:00)  Source: .Blood Blood  Gram Stain (10-05-23 @ 08:18):    Growth in aerobic bottle: Gram Positive Cocci in Clusters    Growth in anaerobic bottle: Gram Positive Cocci in Clusters  Final Report (10-06-23 @ 14:47):    Growth in aerobic and anaerobic bottles: Methicillin Resistant    Staphylococcus aureus  Organism: Methicillin resistant Staphylococcus aureus (10-06-23 @ 14:47)  Organism: Methicillin resistant Staphylococcus aureus (10-06-23 @ 14:47)      -  Clindamycin: R >4      -  Oxacillin: R >2      -  Gentamicin: S <=1 Should not be used as monotherapy      -  Daptomycin: S 1      -  Linezolid: S 2      -  Cefazolin: R >16      -  Vancomycin: S 2      -  Tetracycline: S 2      Method Type: LEWIS      -  Ampicillin/Sulbactam: R <=8/4      -  Penicillin: R 8      -  Rifampin: S <=1 Should not be used as monotherapy      -  Erythromycin: R >4      -  Trimethoprim/Sulfamethoxazole: S <=0.5/9.5    Culture - Blood (collected 10-04-23 @ 06:00)  Source: .Blood Blood  Gram Stain (10-05-23 @ 07:40):    Growth in aerobic bottle: Gram Positive Cocci in Clusters    Growth in anaerobic bottle: Gram Positive Cocci in Clusters  Final Report (10-06-23 @ 14:49):    Growth in aerobic and anaerobic bottles: Methicillin Resistant    Staphylococcus aureus    See previous culture [id number]    Culture - Catheter (collected 10-02-23 @ 16:21)  Source: .Catheter + MRSA Bacterimia Permacath Removal Aerobic/Anaerobic Right  IJ Permacath TIP  Final Report (10-05-23 @ 14:50):    Greater than or equal to 15 Colonies Methicillin Resistant Staphylococcus    aureus  Organism: Methicillin resistant Staphylococcus aureus (10-05-23 @ 14:50)  Organism: Methicillin resistant Staphylococcus aureus (10-05-23 @ 14:50)      -  Clindamycin: R >4      -  Oxacillin: R >2      -  Gentamicin: S <=1 Should not be used as monotherapy      -  Daptomycin: S 1      -  Linezolid: S 4      -  Cefazolin: R >16      -  Vancomycin: S 2      -  Tetracycline: S 2      Method Type: LEWIS      -  Ampicillin/Sulbactam: R 16/8      -  Penicillin: R >8      -  Rifampin: S <=1 Should not be used as monotherapy      -  Erythromycin: R >4      -  Trimethoprim/Sulfamethoxazole: S <=0.5/9.5    Culture - Blood (collected 09-30-23 @ 19:15)  Source: .Blood Blood-Peripheral  Gram Stain (10-01-23 @ 11:24):    Growth in anaerobic bottle: Gram Positive Cocci in Clusters    Growth in aerobic bottle: Gram Positive Cocci in Clusters  Final Report (10-03-23 @ 07:40):    Growth in aerobic and anaerobic bottles: Methicillin Resistant    Staphylococcus aureus    See previous culture  [id number]    Culture - Blood (collected 09-30-23 @ 19:10)  Source: .Blood Blood-Peripheral  Gram Stain (10-01-23 @ 11:02):    Growth in anaerobic bottle: Gram Positive Cocci in Clusters    Growth in aerobic bottle: Gram Positive Cocci in Clusters  Final Report (10-03-23 @ 07:39):    Growth in aerobic and anaerobic bottles: Methicillin Resistant    Staphylococcus aureus    Direct identification is available within approximately 3-5    hours either by Blood Panel Multiplexed PCR or Direct    MALDI-TOF. Details: [email or web address]  Organism: Blood Culture PCR  Methicillin resistant Staphylococcus aureus (10-03-23 @ 07:39)  Organism: Methicillin resistant Staphylococcus aureus (10-03-23 @ 07:39)      -  Clindamycin: R >4      -  Oxacillin: R >2      -  Gentamicin: S <=1 Should not be used as monotherapy      -  Daptomycin: S 1      -  Linezolid: S 4      -  Cefazolin: R >16      -  Vancomycin: S 2      -  Tetracycline: S 2      Method Type: LEWIS      -  Ampicillin/Sulbactam: R 16/8      -  Penicillin: R >8      -  Rifampin: S <=1 Should not be used as monotherapy      -  Erythromycin: R >4      -  Trimethoprim/Sulfamethoxazole: S <=0.5/9.5  Organism: Blood Culture PCR (10-03-23 @ 07:39)      Method Type: PCR      -  Methicillin resistant Staphylococcus aureus (MRSA): Detec      Rapid RVP Result: NotDetec (10-01-23 @ 15:45)        CAPILLARY BLOOD GLUCOSE          RADIOLOGY  CXR:      CT:    ECHO:      VITALS:  T(C): 36.7 (10-07-23 @ 05:00), Max: 36.9 (10-07-23 @ 01:00)  T(F): 98 (10-07-23 @ 05:00), Max: 98.4 (10-07-23 @ 01:00)  HR: 92 (10-07-23 @ 07:00) (77 - 125)  BP: 107/60 (10-07-23 @ 07:00) (90/49 - 138/61)  BP(mean): 74 (10-07-23 @ 07:00) (55 - 107)  ABP: --  ABP(mean): --  RR: 15 (10-07-23 @ 07:00) (6 - 26)  SpO2: 98% (10-07-23 @ 07:00) (96% - 100%)  CVP(mm Hg): --  CVP(cm H2O): --    Ins and Outs     10-06-23 @ 07:01  -  10-07-23 @ 07:00  --------------------------------------------------------  IN: 1656.8 mL / OUT: 2400 mL / NET: -743.2 mL                I&O's Detail    06 Oct 2023 07:01  -  07 Oct 2023 07:00  --------------------------------------------------------  IN:    Albumin 5% - 50 mL: 200 mL    Nepro: 650 mL    Norepinephrine: 6.8 mL    Other (mL): 800 mL  Total IN: 1656.8 mL    OUT:    Other (mL): 2400 mL  Total OUT: 2400 mL    Total NET: -743.2 mL   Follow-up Critical Care Progress Note  Chief Complaint : Pneumonia due to infectious organism    Off vasopressors this morning. Denies any pain.     Allergies :levofloxacin (Unknown)  alfuzosin (Unknown)  tamsulosin (Unknown)  Myrbetriq (Unknown)      PAST MEDICAL & SURGICAL HISTORY:  Chronic atrial fibrillation    History of BPH    CAD (coronary artery disease)    Coronary stent patent    ESRD on dialysis    History of GI bleed    Mitral valve replaced        Medications:  MEDICATIONS  (STANDING):  aspirin  chewable 81 milliGRAM(s) Oral daily  chlorhexidine 2% Cloths 1 Application(s) Topical <User Schedule>  epoetin val (PROCRIT) Injectable 60941 Unit(s) IV Push <User Schedule>  folic acid 1 milliGRAM(s) Oral daily  hydrocortisone hemorrhoidal Suppository 1 Suppository(s) Rectal two times a day  influenza  Vaccine (HIGH DOSE) 0.7 milliLiter(s) IntraMuscular once  levothyroxine 137 MICROGram(s) Oral daily  mesalamine Suppository 1000 milliGRAM(s) Rectal at bedtime  midodrine 15 milliGRAM(s) Oral every 8 hours  mupirocin 2% Nasal 1 Application(s) Both Nostrils two times a day  pantoprazole   Suspension 40 milliGRAM(s) Enteral Tube daily  polyethylene glycol 3350 17 Gram(s) Oral daily  senna 2 Tablet(s) Oral at bedtime    MEDICATIONS  (PRN):  acetaminophen     Tablet .. 650 milliGRAM(s) Oral every 6 hours PRN Mild Pain (1 - 3)  aluminum hydroxide/magnesium hydroxide/simethicone Suspension 10 milliLiter(s) Oral every 6 hours PRN dyspepsia  sodium chloride 0.9% lock flush 10 milliLiter(s) IV Push every 1 hour PRN Pre/post blood products, medications, blood draw, and to maintain line patency      Antibiotics History  caspofungin IVPB    , 10-01-23 @ 08:49  caspofungin IVPB 50 milliGRAM(s) IV Intermittent every 24 hours, 10-02-23 @ 08:48  caspofungin IVPB 70 milliGRAM(s) IV Intermittent once, 10-01-23 @ 08:48  cefepime   IVPB 1000 milliGRAM(s) IV Intermittent once, 09-30-23 @ 19:12, Stop order after: 1 Doses  meropenem  IVPB 500 milliGRAM(s) IV Intermittent once, 10-01-23 @ 09:02, Stop order after: 1 Doses  meropenem  IVPB 500 milliGRAM(s) IV Intermittent every 24 hours, 10-02-23 @ 09:02, Stop order after: 8 Days  meropenem  IVPB    , 10-01-23 @ 09:03  piperacillin/tazobactam IVPB.. 3.375 Gram(s) IV Intermittent every 12 hours, 09-30-23 @ 22:40, Stop order after: 7 Days  vancomycin  IVPB    , 10-01-23 @ 09:00  vancomycin  IVPB 1000 milliGRAM(s) IV Intermittent once, 10-04-23 @ 08:29, Stop order after: 1 Doses  vancomycin  IVPB 1000 milliGRAM(s) IV Intermittent every 24 hours, 10-03-23 @ 00:00  vancomycin  IVPB 1000 milliGRAM(s) IV Intermittent once, 10-01-23 @ 09:00, Stop order after: 1 Doses  vancomycin  IVPB 1000 milliGRAM(s) IV Intermittent every 24 hours, 10-02-23 @ 09:00, Stop order after: 1 Doses  vancomycin  IVPB 750 milliGRAM(s) IV Intermittent once, 10-07-23 @ 08:15, Stop order after: 1 Doses  vancomycin  IVPB. 1000 milliGRAM(s) IV Intermittent once, 09-30-23 @ 19:35, Stop order after: 1 Doses      Heme Medications   aspirin  chewable 81 milliGRAM(s) Oral daily, 10-01-23 @ 12:50      GI Medications  aluminum hydroxide/magnesium hydroxide/simethicone Suspension 10 milliLiter(s) Oral every 6 hours, 10-01-23 @ 13:00, Routine PRN  mesalamine Suppository 1000 milliGRAM(s) Rectal at bedtime, 10-01-23 @ 12:49, Routine  pantoprazole   Suspension 40 milliGRAM(s) Enteral Tube daily, 10-06-23 @ 09:28, Routine  polyethylene glycol 3350 17 Gram(s) Oral daily, 10-01-23 @ 12:36, Routine  senna 2 Tablet(s) Oral at bedtime, 10-01-23 @ 12:36, Routine        LABS:                        7.7    15.48 )-----------( 116      ( 07 Oct 2023 06:00 )             23.3     10-07    139  |  102  |  52<H>  ----------------------------<  157<H>  3.5   |  28  |  3.93<H>    Ca    9.6      07 Oct 2023 06:00  Phos  2.0     10-07  Mg     1.6     10-07    TPro  5.6<L>  /  Alb  2.5<L>  /  TBili  0.7  /  DBili  x   /  AST  28  /  ALT  26  /  AlkPhos  260<H>  10-07    HIT ab -- 10-01 @ 09:40  HIT ab EIA --  D Dimer -2066            Urinalysis Basic - ( 07 Oct 2023 06:00 )    Color: x / Appearance: x / SG: x / pH: x  Gluc: 157 mg/dL / Ketone: x  / Bili: x / Urobili: x   Blood: x / Protein: x / Nitrite: x   Leuk Esterase: x / RBC: x / WBC x   Sq Epi: x / Non Sq Epi: x / Bacteria: x              CULTURES: (if applicable)    Culture - Blood (collected 10-06-23 @ 10:20)  Source: .Blood Blood-Peripheral  Gram Stain (10-07-23 @ 04:46):    Growth in aerobic bottle: Gram Positive Cocci in Clusters    Growth in anaerobic bottle: Gram Positive Cocci in Clusters  Preliminary Report (10-07-23 @ 04:46):    Growth in aerobic bottle: Gram Positive Cocci in Clusters    Growth in anaerobic bottle: Gram Positive Cocci in Clusters    Culture - Blood (collected 10-04-23 @ 06:00)  Source: .Blood Blood  Gram Stain (10-05-23 @ 08:18):    Growth in aerobic bottle: Gram Positive Cocci in Clusters    Growth in anaerobic bottle: Gram Positive Cocci in Clusters  Final Report (10-06-23 @ 14:47):    Growth in aerobic and anaerobic bottles: Methicillin Resistant    Staphylococcus aureus  Organism: Methicillin resistant Staphylococcus aureus (10-06-23 @ 14:47)  Organism: Methicillin resistant Staphylococcus aureus (10-06-23 @ 14:47)      -  Clindamycin: R >4      -  Oxacillin: R >2      -  Gentamicin: S <=1 Should not be used as monotherapy      -  Daptomycin: S 1      -  Linezolid: S 2      -  Cefazolin: R >16      -  Vancomycin: S 2      -  Tetracycline: S 2      Method Type: LEWIS      -  Ampicillin/Sulbactam: R <=8/4      -  Penicillin: R 8      -  Rifampin: S <=1 Should not be used as monotherapy      -  Erythromycin: R >4      -  Trimethoprim/Sulfamethoxazole: S <=0.5/9.5    Culture - Blood (collected 10-04-23 @ 06:00)  Source: .Blood Blood  Gram Stain (10-05-23 @ 07:40):    Growth in aerobic bottle: Gram Positive Cocci in Clusters    Growth in anaerobic bottle: Gram Positive Cocci in Clusters  Final Report (10-06-23 @ 14:49):    Growth in aerobic and anaerobic bottles: Methicillin Resistant    Staphylococcus aureus    See previous culture [id number]    Culture - Catheter (collected 10-02-23 @ 16:21)  Source: .Catheter + MRSA Bacterimia Permacath Removal Aerobic/Anaerobic Right  IJ Permacath TIP  Final Report (10-05-23 @ 14:50):    Greater than or equal to 15 Colonies Methicillin Resistant Staphylococcus    aureus  Organism: Methicillin resistant Staphylococcus aureus (10-05-23 @ 14:50)  Organism: Methicillin resistant Staphylococcus aureus (10-05-23 @ 14:50)      -  Clindamycin: R >4      -  Oxacillin: R >2      -  Gentamicin: S <=1 Should not be used as monotherapy      -  Daptomycin: S 1      -  Linezolid: S 4      -  Cefazolin: R >16      -  Vancomycin: S 2      -  Tetracycline: S 2      Method Type: LEWIS      -  Ampicillin/Sulbactam: R 16/8      -  Penicillin: R >8      -  Rifampin: S <=1 Should not be used as monotherapy      -  Erythromycin: R >4      -  Trimethoprim/Sulfamethoxazole: S <=0.5/9.5    Culture - Blood (collected 09-30-23 @ 19:15)  Source: .Blood Blood-Peripheral  Gram Stain (10-01-23 @ 11:24):    Growth in anaerobic bottle: Gram Positive Cocci in Clusters    Growth in aerobic bottle: Gram Positive Cocci in Clusters  Final Report (10-03-23 @ 07:40):    Growth in aerobic and anaerobic bottles: Methicillin Resistant    Staphylococcus aureus    See previous culture  [id number]    Culture - Blood (collected 09-30-23 @ 19:10)  Source: .Blood Blood-Peripheral  Gram Stain (10-01-23 @ 11:02):    Growth in anaerobic bottle: Gram Positive Cocci in Clusters    Growth in aerobic bottle: Gram Positive Cocci in Clusters  Final Report (10-03-23 @ 07:39):    Growth in aerobic and anaerobic bottles: Methicillin Resistant    Staphylococcus aureus    Direct identification is available within approximately 3-5    hours either by Blood Panel Multiplexed PCR or Direct    MALDI-TOF. Details: [email or web address]  Organism: Blood Culture PCR  Methicillin resistant Staphylococcus aureus (10-03-23 @ 07:39)  Organism: Methicillin resistant Staphylococcus aureus (10-03-23 @ 07:39)      -  Clindamycin: R >4      -  Oxacillin: R >2      -  Gentamicin: S <=1 Should not be used as monotherapy      -  Daptomycin: S 1      -  Linezolid: S 4      -  Cefazolin: R >16      -  Vancomycin: S 2      -  Tetracycline: S 2      Method Type: LEWIS      -  Ampicillin/Sulbactam: R 16/8      -  Penicillin: R >8      -  Rifampin: S <=1 Should not be used as monotherapy      -  Erythromycin: R >4      -  Trimethoprim/Sulfamethoxazole: S <=0.5/9.5  Organism: Blood Culture PCR (10-03-23 @ 07:39)      Method Type: PCR      -  Methicillin resistant Staphylococcus aureus (MRSA): Detec      Rapid RVP Result: NotDetec (10-01-23 @ 15:45)        CAPILLARY BLOOD GLUCOSE          RADIOLOGY  CXR:      CT:    ECHO:      VITALS:  T(C): 36.7 (10-07-23 @ 05:00), Max: 36.9 (10-07-23 @ 01:00)  T(F): 98 (10-07-23 @ 05:00), Max: 98.4 (10-07-23 @ 01:00)  HR: 92 (10-07-23 @ 07:00) (77 - 125)  BP: 107/60 (10-07-23 @ 07:00) (90/49 - 138/61)  BP(mean): 74 (10-07-23 @ 07:00) (55 - 107)  ABP: --  ABP(mean): --  RR: 15 (10-07-23 @ 07:00) (6 - 26)  SpO2: 98% (10-07-23 @ 07:00) (96% - 100%)  CVP(mm Hg): --  CVP(cm H2O): --    Ins and Outs     10-06-23 @ 07:01  -  10-07-23 @ 07:00  --------------------------------------------------------  IN: 1656.8 mL / OUT: 2400 mL / NET: -743.2 mL                I&O's Detail    06 Oct 2023 07:01  -  07 Oct 2023 07:00  --------------------------------------------------------  IN:    Albumin 5% - 50 mL: 200 mL    Nepro: 650 mL    Norepinephrine: 6.8 mL    Other (mL): 800 mL  Total IN: 1656.8 mL    OUT:    Other (mL): 2400 mL  Total OUT: 2400 mL    Total NET: -743.2 mL

## 2023-10-07 NOTE — PROGRESS NOTE ADULT - NUTRITIONAL ASSESSMENT
This patient has been assessed with a concern for Malnutrition and has been determined to have a diagnosis/diagnoses of Moderate protein-calorie malnutrition.    This patient is being managed with:   Diet NPO with Tube Feed-  Tube Feeding Modality: Nasogastric  Nepro with Carb Steady  Total Volume for 24 Hours (mL): 1170  Continuous  Starting Tube Feed Rate {mL per Hour}: 65  Increase Tube Feed Rate by (mL): 0     Every 3 hours  Until Goal Tube Feed Rate (mL per Hour): 65  Tube Feed Duration (in Hours): 18  Tube Feed Start Time: 10:00  Tube Feed Stop Time: 04:00  Banatrol TF     Qty per Day:  2  Entered: Oct  7 2023  8:44AM

## 2023-10-07 NOTE — CHART NOTE - NSCHARTNOTEFT_GEN_A_CORE
Nutrition Follow Up Note  Hospital Course (Per Electronic Medical Record):   Source: Medical Record [X] Patient [X] Family [X] Nursing Staff [X]     Diet: Nepro @ 65ml/hr x 18 hrs (10a-4a) via NGT     Patient continues on EN feeds due to poor mental status , Current EN feeds are meeting assessed needs ,which is providing 2,106kcals, 95g protein, 854ml H2O. Multiple BM's noted as per nursing , will add Banatrol x 2 to EN feeds to aid with loose stools , bowel regimen noted , suggest discontinue. , Renal note reviewed , (+1) generalized edema noted labs reviewed ,     Current Weight: (10/7) 193.3/87.7kg                           (10/6) 190.4/86.4kg                           (10/6) 194/88kg                   Pertinent Medications: MEDICATIONS  (STANDING):  aspirin  chewable 81 milliGRAM(s) Oral daily  chlorhexidine 2% Cloths 1 Application(s) Topical <User Schedule>  epoetin val (PROCRIT) Injectable 22287 Unit(s) IV Push <User Schedule>  folic acid 1 milliGRAM(s) Oral daily  hydrocortisone hemorrhoidal Suppository 1 Suppository(s) Rectal two times a day  influenza  Vaccine (HIGH DOSE) 0.7 milliLiter(s) IntraMuscular once  levothyroxine 137 MICROGram(s) Oral daily to   mesalamine Suppository 1000 milliGRAM(s) Rectal at bedtime  midodrine 15 milliGRAM(s) Oral every 8 hours  mupirocin 2% Nasal 1 Application(s) Both Nostrils two times a day  norepinephrine Infusion 0.05 MICROgram(s)/kG/Min (6.81 mL/Hr) IV Continuous <Continuous>  pantoprazole   Suspension 40 milliGRAM(s) Enteral Tube daily  polyethylene glycol 3350 17 Gram(s) Oral daily  senna 2 Tablet(s) Oral at bedtime  vancomycin  IVPB 750 milliGRAM(s) IV Intermittent once    MEDICATIONS  (PRN):  acetaminophen     Tablet .. 650 milliGRAM(s) Oral every 6 hours PRN Mild Pain (1 - 3)  aluminum hydroxide/magnesium hydroxide/simethicone Suspension 10 milliLiter(s) Oral every 6 hours PRN dyspepsia  sodium chloride 0.9% lock flush 10 milliLiter(s) IV Push every 1 hour PRN Pre/post blood products, medications, blood draw, and to maintain line patency      Pertinent Labs:  10-07 Na139 mmol/L Glu 157 mg/dL<H> K+ 3.5 mmol/L Cr  3.93 mg/dL<H> BUN 52 mg/dL<H> 10-07 Phos 2.0 mg/dL<L> 10-07 Alb 2.5 g/dL<L>  Hgb 7.7g/d<L> Hct 23.3%<L>      Skin: Stage II- sacrum     Edema: (+1) generalized     Last BM: (10/5)     Estimated Needs:   [X] No Change since Previous Assessment       Previous Nutrition Diagnosis: Moderate Malnutrition     Nutrition Diagnosis is [X] addressed        New Nutrition Diagnosis: [X] Not Applicable        Interventions:   1. add Banatrol x 2 due to loose BM's       Monitoring & Evaluation: will monitor:  [X] Weights    [X] Follow Up (Per Protocol)  [X] Tolerance to Diet Prescription       RD to follow as per Nutrition protocol  Haleigh Grant, RD, CDN Nutrition Follow Up Note  Hospital Course (Per Electronic Medical Record):   Source: Medical Record [X] Patient [X] Family [X] Nursing Staff [X]     Diet: Nepro @ 65ml/hr x 18 hrs (10a-4a) via NGT     Patient continues on EN feeds due to poor mental status , Current EN feeds are meeting assessed needs ,which is providing 2,106kcals, 95g protein, 854ml H2O. Multiple BM's noted as per nursing , will add Banatrol x 2 to EN feeds to aid with loose stools , bowel regimen noted , suggest discontinue. , Renal note reviewed , (+1) generalized edema noted labs reviewed ,     Current Weight: (10/7) 193.3/87.7kg                           (10/6) 190.4/86.4kg                           (10/6) 194/88kg                   Pertinent Medications: MEDICATIONS  (STANDING):  aspirin  chewable 81 milliGRAM(s) Oral daily  chlorhexidine 2% Cloths 1 Application(s) Topical <User Schedule>  epoetin val (PROCRIT) Injectable 13058 Unit(s) IV Push <User Schedule>  folic acid 1 milliGRAM(s) Oral daily  hydrocortisone hemorrhoidal Suppository 1 Suppository(s) Rectal two times a day  influenza  Vaccine (HIGH DOSE) 0.7 milliLiter(s) IntraMuscular once  levothyroxine 137 MICROGram(s) Oral daily to   mesalamine Suppository 1000 milliGRAM(s) Rectal at bedtime  midodrine 15 milliGRAM(s) Oral every 8 hours  mupirocin 2% Nasal 1 Application(s) Both Nostrils two times a day  norepinephrine Infusion 0.05 MICROgram(s)/kG/Min (6.81 mL/Hr) IV Continuous <Continuous>  pantoprazole   Suspension 40 milliGRAM(s) Enteral Tube daily  polyethylene glycol 3350 17 Gram(s) Oral daily  senna 2 Tablet(s) Oral at bedtime  vancomycin  IVPB 750 milliGRAM(s) IV Intermittent once    MEDICATIONS  (PRN):  acetaminophen     Tablet .. 650 milliGRAM(s) Oral every 6 hours PRN Mild Pain (1 - 3)  aluminum hydroxide/magnesium hydroxide/simethicone Suspension 10 milliLiter(s) Oral every 6 hours PRN dyspepsia  sodium chloride 0.9% lock flush 10 milliLiter(s) IV Push every 1 hour PRN Pre/post blood products, medications, blood draw, and to maintain line patency      Pertinent Labs:  10-07 Na139 mmol/L Glu 157 mg/dL<H> K+ 3.5 mmol/L Cr  3.93 mg/dL<H> BUN 52 mg/dL<H> 10-07 Phos 2.0 mg/dL<L> 10-07 Alb 2.5 g/dL<L>  Hgb 7.7g/d<L> Hct 23.3%<L>      Skin: Stage II- sacrum     Edema: (+1) generalized     Last BM: (10/5)     Estimated Needs:   [X] No Change since Previous Assessment       Previous Nutrition Diagnosis: Moderate Malnutrition     Nutrition Diagnosis is [X] addressed        New Nutrition Diagnosis: [X] Not Applicable        Interventions:   1. add Banatrol x 2 due to loose BM's       Monitoring & Evaluation: will monitor:  [X] Weights    [X] Follow Up (Per Protocol)  [X] Tolerance to Diet Prescription       RD to follow as per Nutrition protocol  Haleigh Grant, RD, CDN Nutrition Follow Up Note  Hospital Course (Per Electronic Medical Record):   Source: Medical Record [X] Patient [X] Family [X] Nursing Staff [X]     Diet: Nepro @ 65ml/hr x 18 hrs (10a-4a) via NGT     Patient continues on EN feeds due to poor mental status , Current EN feeds are meeting assessed needs ,which is providing 2,106kcals, 95g protein, 854ml H2O. Multiple BM's noted as per nursing , will add Banatrol x 2 to EN feeds to aid with loose stools , bowel regimen noted , suggest discontinue. , Renal note reviewed , (+1) generalized edema noted labs reviewed ,     Current Weight: (10/7) 193.3/87.7kg                           (10/6) 190.4/86.4kg                           (10/6) 194/88kg                   Pertinent Medications: MEDICATIONS  (STANDING):  aspirin  chewable 81 milliGRAM(s) Oral daily  chlorhexidine 2% Cloths 1 Application(s) Topical <User Schedule>  epoetin val (PROCRIT) Injectable 97176 Unit(s) IV Push <User Schedule>  folic acid 1 milliGRAM(s) Oral daily  hydrocortisone hemorrhoidal Suppository 1 Suppository(s) Rectal two times a day  influenza  Vaccine (HIGH DOSE) 0.7 milliLiter(s) IntraMuscular once  levothyroxine 137 MICROGram(s) Oral daily to   mesalamine Suppository 1000 milliGRAM(s) Rectal at bedtime  midodrine 15 milliGRAM(s) Oral every 8 hours  mupirocin 2% Nasal 1 Application(s) Both Nostrils two times a day  norepinephrine Infusion 0.05 MICROgram(s)/kG/Min (6.81 mL/Hr) IV Continuous <Continuous>  pantoprazole   Suspension 40 milliGRAM(s) Enteral Tube daily  polyethylene glycol 3350 17 Gram(s) Oral daily  senna 2 Tablet(s) Oral at bedtime  vancomycin  IVPB 750 milliGRAM(s) IV Intermittent once    MEDICATIONS  (PRN):  acetaminophen     Tablet .. 650 milliGRAM(s) Oral every 6 hours PRN Mild Pain (1 - 3)  aluminum hydroxide/magnesium hydroxide/simethicone Suspension 10 milliLiter(s) Oral every 6 hours PRN dyspepsia  sodium chloride 0.9% lock flush 10 milliLiter(s) IV Push every 1 hour PRN Pre/post blood products, medications, blood draw, and to maintain line patency      Pertinent Labs:  10-07 Na139 mmol/L Glu 157 mg/dL<H> K+ 3.5 mmol/L Cr  3.93 mg/dL<H> BUN 52 mg/dL<H> 10-07 Phos 2.0 mg/dL<L> 10-07 Alb 2.5 g/dL<L>  Hgb 7.7g/d<L> Hct 23.3%<L>      Skin: Stage II- sacrum     Edema: (+1) generalized     Last BM: (10/5)     Estimated Needs:   [X] No Change since Previous Assessment       Previous Nutrition Diagnosis: Moderate Malnutrition     Nutrition Diagnosis is [X] addressed        New Nutrition Diagnosis: [X] Not Applicable        Interventions:   1. add Banatrol x 2 due to loose BM's       Monitoring & Evaluation: will monitor:  [X] Weights    [X] Follow Up (Per Protocol)  [X] Tolerance to Diet Prescription       RD to follow as per Nutrition protocol  Haleigh Grant, RD, CDN Nutrition Follow Up Note  Hospital Course (Per Electronic Medical Record):   Source: Medical Record [X] Patient [X] Family [X] Nursing Staff [X]     Diet: Nepro @ 65ml/hr x 18 hrs (10a-4a) via NGT     Patient continues on EN feeds due to poor mental status , Current EN feeds are meeting assessed needs ,which is providing 2,106kcals, 95g protein, 854ml H2O. Multiple BM's noted as per nursing , will add Banatrol x 2(125mg potassium per packet)  to EN feeds to aid with loose stools , bowel regimen noted , suggest discontinue. , Renal note reviewed , (+1) generalized edema noted labs reviewed ,     Current Weight: (10/7) 193.3/87.7kg                           (10/6) 190.4/86.4kg                           (10/6) 194/88kg                   Pertinent Medications: MEDICATIONS  (STANDING):  aspirin  chewable 81 milliGRAM(s) Oral daily  chlorhexidine 2% Cloths 1 Application(s) Topical <User Schedule>  epoetin val (PROCRIT) Injectable 94761 Unit(s) IV Push <User Schedule>  folic acid 1 milliGRAM(s) Oral daily  hydrocortisone hemorrhoidal Suppository 1 Suppository(s) Rectal two times a day  influenza  Vaccine (HIGH DOSE) 0.7 milliLiter(s) IntraMuscular once  levothyroxine 137 MICROGram(s) Oral daily to   mesalamine Suppository 1000 milliGRAM(s) Rectal at bedtime  midodrine 15 milliGRAM(s) Oral every 8 hours  mupirocin 2% Nasal 1 Application(s) Both Nostrils two times a day  norepinephrine Infusion 0.05 MICROgram(s)/kG/Min (6.81 mL/Hr) IV Continuous <Continuous>  pantoprazole   Suspension 40 milliGRAM(s) Enteral Tube daily  polyethylene glycol 3350 17 Gram(s) Oral daily  senna 2 Tablet(s) Oral at bedtime  vancomycin  IVPB 750 milliGRAM(s) IV Intermittent once    MEDICATIONS  (PRN):  acetaminophen     Tablet .. 650 milliGRAM(s) Oral every 6 hours PRN Mild Pain (1 - 3)  aluminum hydroxide/magnesium hydroxide/simethicone Suspension 10 milliLiter(s) Oral every 6 hours PRN dyspepsia  sodium chloride 0.9% lock flush 10 milliLiter(s) IV Push every 1 hour PRN Pre/post blood products, medications, blood draw, and to maintain line patency      Pertinent Labs:  10-07 Na139 mmol/L Glu 157 mg/dL<H> K+ 3.5 mmol/L Cr  3.93 mg/dL<H> BUN 52 mg/dL<H> 10-07 Phos 2.0 mg/dL<L> 10-07 Alb 2.5 g/dL<L>  Hgb 7.7g/d<L> Hct 23.3%<L>      Skin: Stage II- sacrum     Edema: (+1) generalized     Last BM: (10/5)     Estimated Needs:   [X] No Change since Previous Assessment       Previous Nutrition Diagnosis: Moderate Malnutrition     Nutrition Diagnosis is [X] addressed        New Nutrition Diagnosis: [X] Not Applicable        Interventions:   1. add Banatrol x 2 due to loose BM's       Monitoring & Evaluation: will monitor:  [X] Weights    [X] Follow Up (Per Protocol)  [X] Tolerance to Diet Prescription       RD to follow as per Nutrition protocol  Haleigh Grant RD, CDN Nutrition Follow Up Note  Hospital Course (Per Electronic Medical Record):   Source: Medical Record [X] Patient [X] Family [X] Nursing Staff [X]     Diet: Nepro @ 65ml/hr x 18 hrs (10a-4a) via NGT     Patient continues on EN feeds due to poor mental status , Current EN feeds are meeting assessed needs ,which is providing 2,106kcals, 95g protein, 854ml H2O. Multiple BM's noted as per nursing , will add Banatrol x 2(125mg potassium per packet)  to EN feeds to aid with loose stools , bowel regimen noted , suggest discontinue. , Renal note reviewed , (+1) generalized edema noted labs reviewed ,     Current Weight: (10/7) 193.3/87.7kg                           (10/6) 190.4/86.4kg                           (10/6) 194/88kg                   Pertinent Medications: MEDICATIONS  (STANDING):  aspirin  chewable 81 milliGRAM(s) Oral daily  chlorhexidine 2% Cloths 1 Application(s) Topical <User Schedule>  epoetin val (PROCRIT) Injectable 65660 Unit(s) IV Push <User Schedule>  folic acid 1 milliGRAM(s) Oral daily  hydrocortisone hemorrhoidal Suppository 1 Suppository(s) Rectal two times a day  influenza  Vaccine (HIGH DOSE) 0.7 milliLiter(s) IntraMuscular once  levothyroxine 137 MICROGram(s) Oral daily to   mesalamine Suppository 1000 milliGRAM(s) Rectal at bedtime  midodrine 15 milliGRAM(s) Oral every 8 hours  mupirocin 2% Nasal 1 Application(s) Both Nostrils two times a day  norepinephrine Infusion 0.05 MICROgram(s)/kG/Min (6.81 mL/Hr) IV Continuous <Continuous>  pantoprazole   Suspension 40 milliGRAM(s) Enteral Tube daily  polyethylene glycol 3350 17 Gram(s) Oral daily  senna 2 Tablet(s) Oral at bedtime  vancomycin  IVPB 750 milliGRAM(s) IV Intermittent once    MEDICATIONS  (PRN):  acetaminophen     Tablet .. 650 milliGRAM(s) Oral every 6 hours PRN Mild Pain (1 - 3)  aluminum hydroxide/magnesium hydroxide/simethicone Suspension 10 milliLiter(s) Oral every 6 hours PRN dyspepsia  sodium chloride 0.9% lock flush 10 milliLiter(s) IV Push every 1 hour PRN Pre/post blood products, medications, blood draw, and to maintain line patency      Pertinent Labs:  10-07 Na139 mmol/L Glu 157 mg/dL<H> K+ 3.5 mmol/L Cr  3.93 mg/dL<H> BUN 52 mg/dL<H> 10-07 Phos 2.0 mg/dL<L> 10-07 Alb 2.5 g/dL<L>  Hgb 7.7g/d<L> Hct 23.3%<L>      Skin: Stage II- sacrum     Edema: (+1) generalized     Last BM: (10/5)     Estimated Needs:   [X] No Change since Previous Assessment       Previous Nutrition Diagnosis: Moderate Malnutrition     Nutrition Diagnosis is [X] addressed        New Nutrition Diagnosis: [X] Not Applicable        Interventions:   1. add Banatrol x 2 due to loose BM's       Monitoring & Evaluation: will monitor:  [X] Weights    [X] Follow Up (Per Protocol)  [X] Tolerance to Diet Prescription       RD to follow as per Nutrition protocol  Haleigh Grant RD, CDN Nutrition Follow Up Note  Hospital Course (Per Electronic Medical Record):   Source: Medical Record [X] Patient [X] Family [X] Nursing Staff [X]     Diet: Nepro @ 65ml/hr x 18 hrs (10a-4a) via NGT     Patient continues on EN feeds due to poor mental status , Current EN feeds are meeting assessed needs ,which is providing 2,106kcals, 95g protein, 854ml H2O. Multiple BM's noted as per nursing , will add Banatrol x 2(125mg potassium per packet)  to EN feeds to aid with loose stools , bowel regimen noted , suggest discontinue. , Renal note reviewed , (+1) generalized edema noted labs reviewed ,     Current Weight: (10/7) 193.3/87.7kg                           (10/6) 190.4/86.4kg                           (10/6) 194/88kg                   Pertinent Medications: MEDICATIONS  (STANDING):  aspirin  chewable 81 milliGRAM(s) Oral daily  chlorhexidine 2% Cloths 1 Application(s) Topical <User Schedule>  epoetin val (PROCRIT) Injectable 66367 Unit(s) IV Push <User Schedule>  folic acid 1 milliGRAM(s) Oral daily  hydrocortisone hemorrhoidal Suppository 1 Suppository(s) Rectal two times a day  influenza  Vaccine (HIGH DOSE) 0.7 milliLiter(s) IntraMuscular once  levothyroxine 137 MICROGram(s) Oral daily to   mesalamine Suppository 1000 milliGRAM(s) Rectal at bedtime  midodrine 15 milliGRAM(s) Oral every 8 hours  mupirocin 2% Nasal 1 Application(s) Both Nostrils two times a day  norepinephrine Infusion 0.05 MICROgram(s)/kG/Min (6.81 mL/Hr) IV Continuous <Continuous>  pantoprazole   Suspension 40 milliGRAM(s) Enteral Tube daily  polyethylene glycol 3350 17 Gram(s) Oral daily  senna 2 Tablet(s) Oral at bedtime  vancomycin  IVPB 750 milliGRAM(s) IV Intermittent once    MEDICATIONS  (PRN):  acetaminophen     Tablet .. 650 milliGRAM(s) Oral every 6 hours PRN Mild Pain (1 - 3)  aluminum hydroxide/magnesium hydroxide/simethicone Suspension 10 milliLiter(s) Oral every 6 hours PRN dyspepsia  sodium chloride 0.9% lock flush 10 milliLiter(s) IV Push every 1 hour PRN Pre/post blood products, medications, blood draw, and to maintain line patency      Pertinent Labs:  10-07 Na139 mmol/L Glu 157 mg/dL<H> K+ 3.5 mmol/L Cr  3.93 mg/dL<H> BUN 52 mg/dL<H> 10-07 Phos 2.0 mg/dL<L> 10-07 Alb 2.5 g/dL<L>  Hgb 7.7g/d<L> Hct 23.3%<L>      Skin: Stage II- sacrum     Edema: (+1) generalized     Last BM: (10/5)     Estimated Needs:   [X] No Change since Previous Assessment       Previous Nutrition Diagnosis: Moderate Malnutrition     Nutrition Diagnosis is [X] addressed        New Nutrition Diagnosis: [X] Not Applicable        Interventions:   1. add Banatrol x 2 due to loose BM's       Monitoring & Evaluation: will monitor:  [X] Weights    [X] Follow Up (Per Protocol)  [X] Tolerance to Diet Prescription       RD to follow as per Nutrition protocol  Haleigh Grant RD, CDN

## 2023-10-08 LAB
ALBUMIN SERPL ELPH-MCNC: 2.2 G/DL — LOW (ref 3.3–5)
ALP SERPL-CCNC: 250 U/L — HIGH (ref 40–120)
ALT FLD-CCNC: 27 U/L — SIGNIFICANT CHANGE UP (ref 10–45)
ANION GAP SERPL CALC-SCNC: 5 MMOL/L — SIGNIFICANT CHANGE UP (ref 5–17)
APTT BLD: 30.8 SEC — SIGNIFICANT CHANGE UP (ref 24.5–35.6)
AST SERPL-CCNC: 42 U/L — HIGH (ref 10–40)
BASOPHILS # BLD AUTO: 0.1 K/UL — SIGNIFICANT CHANGE UP (ref 0–0.2)
BASOPHILS NFR BLD AUTO: 0.5 % — SIGNIFICANT CHANGE UP (ref 0–2)
BILIRUB SERPL-MCNC: 0.6 MG/DL — SIGNIFICANT CHANGE UP (ref 0.2–1.2)
BUN SERPL-MCNC: 66 MG/DL — HIGH (ref 7–23)
CALCIUM SERPL-MCNC: 9.8 MG/DL — SIGNIFICANT CHANGE UP (ref 8.4–10.5)
CHLORIDE SERPL-SCNC: 100 MMOL/L — SIGNIFICANT CHANGE UP (ref 96–108)
CO2 SERPL-SCNC: 31 MMOL/L — SIGNIFICANT CHANGE UP (ref 22–31)
CREAT SERPL-MCNC: 4.8 MG/DL — HIGH (ref 0.5–1.3)
CULTURE RESULTS: SIGNIFICANT CHANGE UP
EGFR: 12 ML/MIN/1.73M2 — LOW
EOSINOPHIL # BLD AUTO: 0.49 K/UL — SIGNIFICANT CHANGE UP (ref 0–0.5)
EOSINOPHIL NFR BLD AUTO: 2.5 % — SIGNIFICANT CHANGE UP (ref 0–6)
GLUCOSE SERPL-MCNC: 128 MG/DL — HIGH (ref 70–99)
HCT VFR BLD CALC: 24.4 % — LOW (ref 39–50)
HGB BLD-MCNC: 7.9 G/DL — LOW (ref 13–17)
IMM GRANULOCYTES NFR BLD AUTO: 0.8 % — SIGNIFICANT CHANGE UP (ref 0–0.9)
INR BLD: 1.06 RATIO — SIGNIFICANT CHANGE UP (ref 0.85–1.18)
LYMPHOCYTES # BLD AUTO: 1.33 K/UL — SIGNIFICANT CHANGE UP (ref 1–3.3)
LYMPHOCYTES # BLD AUTO: 6.7 % — LOW (ref 13–44)
MAGNESIUM SERPL-MCNC: 1.8 MG/DL — SIGNIFICANT CHANGE UP (ref 1.6–2.6)
MCHC RBC-ENTMCNC: 30.3 PG — SIGNIFICANT CHANGE UP (ref 27–34)
MCHC RBC-ENTMCNC: 32.4 GM/DL — SIGNIFICANT CHANGE UP (ref 32–36)
MCV RBC AUTO: 93.5 FL — SIGNIFICANT CHANGE UP (ref 80–100)
MONOCYTES # BLD AUTO: 1.29 K/UL — HIGH (ref 0–0.9)
MONOCYTES NFR BLD AUTO: 6.5 % — SIGNIFICANT CHANGE UP (ref 2–14)
NEUTROPHILS # BLD AUTO: 16.6 K/UL — HIGH (ref 1.8–7.4)
NEUTROPHILS NFR BLD AUTO: 83 % — HIGH (ref 43–77)
NRBC # BLD: 0 /100 WBCS — SIGNIFICANT CHANGE UP (ref 0–0)
PHOSPHATE SERPL-MCNC: 3 MG/DL — SIGNIFICANT CHANGE UP (ref 2.5–4.5)
PLATELET # BLD AUTO: 107 K/UL — LOW (ref 150–400)
POTASSIUM SERPL-MCNC: 3.4 MMOL/L — LOW (ref 3.5–5.3)
POTASSIUM SERPL-SCNC: 3.4 MMOL/L — LOW (ref 3.5–5.3)
PROT SERPL-MCNC: 5.9 G/DL — LOW (ref 6–8.3)
PROTHROM AB SERPL-ACNC: 12.1 SEC — SIGNIFICANT CHANGE UP (ref 9.5–13)
RBC # BLD: 2.61 M/UL — LOW (ref 4.2–5.8)
RBC # FLD: 16 % — HIGH (ref 10.3–14.5)
SODIUM SERPL-SCNC: 136 MMOL/L — SIGNIFICANT CHANGE UP (ref 135–145)
SPECIMEN SOURCE: SIGNIFICANT CHANGE UP
WBC # BLD: 19.97 K/UL — HIGH (ref 3.8–10.5)
WBC # FLD AUTO: 19.97 K/UL — HIGH (ref 3.8–10.5)

## 2023-10-08 RX ORDER — CEFTAROLINE FOSAMIL 600 MG/20ML
POWDER, FOR SOLUTION INTRAVENOUS
Refills: 0 | Status: DISCONTINUED | OUTPATIENT
Start: 2023-10-08 | End: 2023-10-09

## 2023-10-08 RX ORDER — CEFTAROLINE FOSAMIL 600 MG/20ML
200 POWDER, FOR SOLUTION INTRAVENOUS EVERY 12 HOURS
Refills: 0 | Status: DISCONTINUED | OUTPATIENT
Start: 2023-10-08 | End: 2023-10-09

## 2023-10-08 RX ORDER — POTASSIUM CHLORIDE 20 MEQ
20 PACKET (EA) ORAL ONCE
Refills: 0 | Status: COMPLETED | OUTPATIENT
Start: 2023-10-08 | End: 2023-10-08

## 2023-10-08 RX ORDER — CEFTAROLINE FOSAMIL 600 MG/20ML
200 POWDER, FOR SOLUTION INTRAVENOUS ONCE
Refills: 0 | Status: COMPLETED | OUTPATIENT
Start: 2023-10-08 | End: 2023-10-08

## 2023-10-08 RX ORDER — MIDODRINE HYDROCHLORIDE 2.5 MG/1
10 TABLET ORAL EVERY 8 HOURS
Refills: 0 | Status: DISCONTINUED | OUTPATIENT
Start: 2023-10-08 | End: 2023-10-14

## 2023-10-08 RX ADMIN — Medication 1 MILLIGRAM(S): at 11:34

## 2023-10-08 RX ADMIN — Medication 1 SUPPOSITORY(S): at 06:01

## 2023-10-08 RX ADMIN — PANTOPRAZOLE SODIUM 40 MILLIGRAM(S): 20 TABLET, DELAYED RELEASE ORAL at 11:34

## 2023-10-08 RX ADMIN — HEPARIN SODIUM 5000 UNIT(S): 5000 INJECTION INTRAVENOUS; SUBCUTANEOUS at 06:00

## 2023-10-08 RX ADMIN — HEPARIN SODIUM 5000 UNIT(S): 5000 INJECTION INTRAVENOUS; SUBCUTANEOUS at 18:24

## 2023-10-08 RX ADMIN — Medication 1000 MILLIGRAM(S): at 21:16

## 2023-10-08 RX ADMIN — MUPIROCIN 1 APPLICATION(S): 20 OINTMENT TOPICAL at 06:01

## 2023-10-08 RX ADMIN — Medication 81 MILLIGRAM(S): at 11:35

## 2023-10-08 RX ADMIN — CHLORHEXIDINE GLUCONATE 1 APPLICATION(S): 213 SOLUTION TOPICAL at 06:00

## 2023-10-08 RX ADMIN — CEFTAROLINE FOSAMIL 50 MILLIGRAM(S): 600 POWDER, FOR SOLUTION INTRAVENOUS at 10:31

## 2023-10-08 RX ADMIN — CEFTAROLINE FOSAMIL 50 MILLIGRAM(S): 600 POWDER, FOR SOLUTION INTRAVENOUS at 21:15

## 2023-10-08 RX ADMIN — Medication 1 SUPPOSITORY(S): at 18:24

## 2023-10-08 RX ADMIN — MIDODRINE HYDROCHLORIDE 10 MILLIGRAM(S): 2.5 TABLET ORAL at 21:17

## 2023-10-08 RX ADMIN — SENNA PLUS 2 TABLET(S): 8.6 TABLET ORAL at 21:17

## 2023-10-08 RX ADMIN — Medication 20 MILLIEQUIVALENT(S): at 10:32

## 2023-10-08 RX ADMIN — Medication 137 MICROGRAM(S): at 06:01

## 2023-10-08 NOTE — PROGRESS NOTE ADULT - SUBJECTIVE AND OBJECTIVE BOX
INTERVAL HPI/OVERNIGHT EVENTS:  No events ON .     Patient is a 78y old  Male who presents with a chief complaint of Sepsis (07 Oct 2023 11:16)  Chief Complaint : Pneumonia due to infectious organism      ROS:  CONSTITUTIONAL:  Negative chills  EYES:  Negative  blurry vision  CARDIOVASCULAR:  Negative for chest pain or palpitations  RESPIRATORY:  Negative for cough  GASTROINTESTINAL:  Negative for nausea, vomiting, or abdominal pain  GENITOURINARY:  Negative  dysuria  NEUROLOGIC:  No headache,  dizziness    Allergies    levofloxacin (Unknown)  alfuzosin (Unknown)  tamsulosin (Unknown)  Myrbetriq (Unknown)    Intolerances        ANTIBIOTICS/RELEVANT:  antimicrobials  ceftaroline fosamil IVPB      ceftaroline fosamil IVPB 200 milliGRAM(s) IV Intermittent once  ceftaroline fosamil IVPB 200 milliGRAM(s) IV Intermittent every 12 hours  vancomycin  IVPB 750 milliGRAM(s) IV Intermittent <User Schedule>    immunologic:  epoetin val (PROCRIT) Injectable 41118 Unit(s) IV Push <User Schedule>  influenza  Vaccine (HIGH DOSE) 0.7 milliLiter(s) IntraMuscular once    OTHER:  acetaminophen     Tablet .. 650 milliGRAM(s) Oral every 6 hours PRN  aluminum hydroxide/magnesium hydroxide/simethicone Suspension 10 milliLiter(s) Oral every 6 hours PRN  aspirin  chewable 81 milliGRAM(s) Oral daily  chlorhexidine 2% Cloths 1 Application(s) Topical <User Schedule>  folic acid 1 milliGRAM(s) Oral daily  heparin   Injectable 5000 Unit(s) SubCutaneous every 12 hours  hydrocortisone hemorrhoidal Suppository 1 Suppository(s) Rectal two times a day  levothyroxine 137 MICROGram(s) Oral daily  mesalamine Suppository 1000 milliGRAM(s) Rectal at bedtime  midodrine 10 milliGRAM(s) Oral every 8 hours  pantoprazole   Suspension 40 milliGRAM(s) Enteral Tube daily  polyethylene glycol 3350 17 Gram(s) Oral daily  potassium chloride   Solution 20 milliEquivalent(s) Oral once  senna 2 Tablet(s) Oral at bedtime  sodium chloride 0.9% lock flush 10 milliLiter(s) IV Push every 1 hour PRN      Objective:  Vital Signs Last 24 Hrs  T(C): 36.9 (08 Oct 2023 06:00), Max: 36.9 (08 Oct 2023 06:00)  T(F): 98.4 (08 Oct 2023 06:00), Max: 98.4 (08 Oct 2023 06:00)  HR: 78 (08 Oct 2023 06:00) (64 - 100)  BP: 132/63 (08 Oct 2023 06:00) (105/46 - 146/64)  BP(mean): 84 (08 Oct 2023 06:00) (64 - 95)  RR: 10 (08 Oct 2023 06:00) (10 - 21)  SpO2: 100% (08 Oct 2023 06:00) (92% - 100%)        PHYSICAL EXAM:  General - Awake, NAD   HEENT - No JVD, Moist MM, RIJ HD catheter placed 10/4   CV -  S1, S2,  + Murmur  Resp - Bilateral air entry, No Rhonchi, No Wheezing  Abdomen - Soft, nondistended, nontender, normoactive bowel sounds, NGT   Extremities -  + bilateral upper extremity edema, nontender, No Cyanosis or Clubbing   Skin - pale, warm, dry       LABS:                        7.9    19.97 )-----------( 107      ( 08 Oct 2023 06:30 )             24.4     10-08    136  |  100  |  66<H>  ----------------------------<  128<H>  3.4<L>   |  31  |  4.80<H>    Ca    9.8      08 Oct 2023 06:30  Phos  3.0     10-08  Mg     1.8     10-08    TPro  5.9<L>  /  Alb  2.2<L>  /  TBili  0.6  /  DBili  x   /  AST  42<H>  /  ALT  27  /  AlkPhos  250<H>  10-08    PT/INR - ( 08 Oct 2023 06:30 )   PT: 12.1 sec;   INR: 1.06 ratio         PTT - ( 08 Oct 2023 06:30 )  PTT:30.8 sec  Urinalysis Basic - ( 08 Oct 2023 06:30 )    Color: x / Appearance: x / SG: x / pH: x  Gluc: 128 mg/dL / Ketone: x  / Bili: x / Urobili: x   Blood: x / Protein: x / Nitrite: x   Leuk Esterase: x / RBC: x / WBC x   Sq Epi: x / Non Sq Epi: x / Bacteria: x      MICROBIOLOGY:  Culture - Blood (10.06.23 @ 10:20)    Gram Stain:   Growth in aerobic bottle: Gram Positive Cocci in Clusters  Growth in anaerobic bottle: Gram Positive Cocci in Clusters   Specimen Source: .Blood Blood-Peripheral   Culture Results:   Growth in aerobic and anaerobic bottles: Methicillin Resistant  Staphylococcus aureus  See previous culture [id number]      RADIOLOGY & ADDITIONAL STUDIES:  < from: TTE Echo Complete w/o Contrast w/ Doppler (10.01.23 @ 10:26) >  Summary:   1. 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 Biatrial enlargement   4. Left ventricular ejection fraction, by visual estimation, is 45 to   50%.   5. Mild mitral annular calcification.   6. Mild-moderate tricuspid regurgitation.   7. Mild aortic valve stenosis.    < end of copied text >   INTERVAL HPI/OVERNIGHT EVENTS:  No events ON .     Patient is a 78y old  Male who presents with a chief complaint of Sepsis (07 Oct 2023 11:16)  Chief Complaint : Pneumonia due to infectious organism      ROS:  CONSTITUTIONAL:  Negative chills  EYES:  Negative  blurry vision  CARDIOVASCULAR:  Negative for chest pain or palpitations  RESPIRATORY:  Negative for cough  GASTROINTESTINAL:  Negative for nausea, vomiting, or abdominal pain  GENITOURINARY:  Negative  dysuria  NEUROLOGIC:  No headache,  dizziness    Allergies    levofloxacin (Unknown)  alfuzosin (Unknown)  tamsulosin (Unknown)  Myrbetriq (Unknown)    Intolerances        ANTIBIOTICS/RELEVANT:  antimicrobials  ceftaroline fosamil IVPB      ceftaroline fosamil IVPB 200 milliGRAM(s) IV Intermittent once  ceftaroline fosamil IVPB 200 milliGRAM(s) IV Intermittent every 12 hours  vancomycin  IVPB 750 milliGRAM(s) IV Intermittent <User Schedule>    immunologic:  epoetin val (PROCRIT) Injectable 17612 Unit(s) IV Push <User Schedule>  influenza  Vaccine (HIGH DOSE) 0.7 milliLiter(s) IntraMuscular once    OTHER:  acetaminophen     Tablet .. 650 milliGRAM(s) Oral every 6 hours PRN  aluminum hydroxide/magnesium hydroxide/simethicone Suspension 10 milliLiter(s) Oral every 6 hours PRN  aspirin  chewable 81 milliGRAM(s) Oral daily  chlorhexidine 2% Cloths 1 Application(s) Topical <User Schedule>  folic acid 1 milliGRAM(s) Oral daily  heparin   Injectable 5000 Unit(s) SubCutaneous every 12 hours  hydrocortisone hemorrhoidal Suppository 1 Suppository(s) Rectal two times a day  levothyroxine 137 MICROGram(s) Oral daily  mesalamine Suppository 1000 milliGRAM(s) Rectal at bedtime  midodrine 10 milliGRAM(s) Oral every 8 hours  pantoprazole   Suspension 40 milliGRAM(s) Enteral Tube daily  polyethylene glycol 3350 17 Gram(s) Oral daily  potassium chloride   Solution 20 milliEquivalent(s) Oral once  senna 2 Tablet(s) Oral at bedtime  sodium chloride 0.9% lock flush 10 milliLiter(s) IV Push every 1 hour PRN      Objective:  Vital Signs Last 24 Hrs  T(C): 36.9 (08 Oct 2023 06:00), Max: 36.9 (08 Oct 2023 06:00)  T(F): 98.4 (08 Oct 2023 06:00), Max: 98.4 (08 Oct 2023 06:00)  HR: 78 (08 Oct 2023 06:00) (64 - 100)  BP: 132/63 (08 Oct 2023 06:00) (105/46 - 146/64)  BP(mean): 84 (08 Oct 2023 06:00) (64 - 95)  RR: 10 (08 Oct 2023 06:00) (10 - 21)  SpO2: 100% (08 Oct 2023 06:00) (92% - 100%)        PHYSICAL EXAM:  General - Awake, NAD   HEENT - No JVD, Moist MM, RIJ HD catheter placed 10/4   CV -  S1, S2,  + Murmur  Resp - Bilateral air entry, No Rhonchi, No Wheezing  Abdomen - Soft, nondistended, nontender, normoactive bowel sounds, NGT   Extremities -  + bilateral upper extremity edema, nontender, No Cyanosis or Clubbing   Skin - pale, warm, dry       LABS:                        7.9    19.97 )-----------( 107      ( 08 Oct 2023 06:30 )             24.4     10-08    136  |  100  |  66<H>  ----------------------------<  128<H>  3.4<L>   |  31  |  4.80<H>    Ca    9.8      08 Oct 2023 06:30  Phos  3.0     10-08  Mg     1.8     10-08    TPro  5.9<L>  /  Alb  2.2<L>  /  TBili  0.6  /  DBili  x   /  AST  42<H>  /  ALT  27  /  AlkPhos  250<H>  10-08    PT/INR - ( 08 Oct 2023 06:30 )   PT: 12.1 sec;   INR: 1.06 ratio         PTT - ( 08 Oct 2023 06:30 )  PTT:30.8 sec  Urinalysis Basic - ( 08 Oct 2023 06:30 )    Color: x / Appearance: x / SG: x / pH: x  Gluc: 128 mg/dL / Ketone: x  / Bili: x / Urobili: x   Blood: x / Protein: x / Nitrite: x   Leuk Esterase: x / RBC: x / WBC x   Sq Epi: x / Non Sq Epi: x / Bacteria: x      MICROBIOLOGY:  Culture - Blood (10.06.23 @ 10:20)    Gram Stain:   Growth in aerobic bottle: Gram Positive Cocci in Clusters  Growth in anaerobic bottle: Gram Positive Cocci in Clusters   Specimen Source: .Blood Blood-Peripheral   Culture Results:   Growth in aerobic and anaerobic bottles: Methicillin Resistant  Staphylococcus aureus  See previous culture [id number]      RADIOLOGY & ADDITIONAL STUDIES:  < from: TTE Echo Complete w/o Contrast w/ Doppler (10.01.23 @ 10:26) >  Summary:   1. 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 Biatrial enlargement   4. Left ventricular ejection fraction, by visual estimation, is 45 to   50%.   5. Mild mitral annular calcification.   6. Mild-moderate tricuspid regurgitation.   7. Mild aortic valve stenosis.    < end of copied text >   INTERVAL HPI/OVERNIGHT EVENTS:  No events ON .     Patient is a 78y old  Male who presents with a chief complaint of Sepsis (07 Oct 2023 11:16)  Chief Complaint : Pneumonia due to infectious organism      ROS:  CONSTITUTIONAL:  Negative chills  EYES:  Negative  blurry vision  CARDIOVASCULAR:  Negative for chest pain or palpitations  RESPIRATORY:  Negative for cough  GASTROINTESTINAL:  Negative for nausea, vomiting, or abdominal pain  GENITOURINARY:  Negative  dysuria  NEUROLOGIC:  No headache,  dizziness    Allergies    levofloxacin (Unknown)  alfuzosin (Unknown)  tamsulosin (Unknown)  Myrbetriq (Unknown)    Intolerances        ANTIBIOTICS/RELEVANT:  antimicrobials  ceftaroline fosamil IVPB      ceftaroline fosamil IVPB 200 milliGRAM(s) IV Intermittent once  ceftaroline fosamil IVPB 200 milliGRAM(s) IV Intermittent every 12 hours  vancomycin  IVPB 750 milliGRAM(s) IV Intermittent <User Schedule>    immunologic:  epoetin val (PROCRIT) Injectable 40614 Unit(s) IV Push <User Schedule>  influenza  Vaccine (HIGH DOSE) 0.7 milliLiter(s) IntraMuscular once    OTHER:  acetaminophen     Tablet .. 650 milliGRAM(s) Oral every 6 hours PRN  aluminum hydroxide/magnesium hydroxide/simethicone Suspension 10 milliLiter(s) Oral every 6 hours PRN  aspirin  chewable 81 milliGRAM(s) Oral daily  chlorhexidine 2% Cloths 1 Application(s) Topical <User Schedule>  folic acid 1 milliGRAM(s) Oral daily  heparin   Injectable 5000 Unit(s) SubCutaneous every 12 hours  hydrocortisone hemorrhoidal Suppository 1 Suppository(s) Rectal two times a day  levothyroxine 137 MICROGram(s) Oral daily  mesalamine Suppository 1000 milliGRAM(s) Rectal at bedtime  midodrine 10 milliGRAM(s) Oral every 8 hours  pantoprazole   Suspension 40 milliGRAM(s) Enteral Tube daily  polyethylene glycol 3350 17 Gram(s) Oral daily  potassium chloride   Solution 20 milliEquivalent(s) Oral once  senna 2 Tablet(s) Oral at bedtime  sodium chloride 0.9% lock flush 10 milliLiter(s) IV Push every 1 hour PRN      Objective:  Vital Signs Last 24 Hrs  T(C): 36.9 (08 Oct 2023 06:00), Max: 36.9 (08 Oct 2023 06:00)  T(F): 98.4 (08 Oct 2023 06:00), Max: 98.4 (08 Oct 2023 06:00)  HR: 78 (08 Oct 2023 06:00) (64 - 100)  BP: 132/63 (08 Oct 2023 06:00) (105/46 - 146/64)  BP(mean): 84 (08 Oct 2023 06:00) (64 - 95)  RR: 10 (08 Oct 2023 06:00) (10 - 21)  SpO2: 100% (08 Oct 2023 06:00) (92% - 100%)        PHYSICAL EXAM:  General - Awake, NAD   HEENT - No JVD, Moist MM, RIJ HD catheter placed 10/4   CV -  S1, S2,  + Murmur  Resp - Bilateral air entry, No Rhonchi, No Wheezing  Abdomen - Soft, nondistended, nontender, normoactive bowel sounds, NGT   Extremities -  + bilateral upper extremity edema, nontender, No Cyanosis or Clubbing   Skin - pale, warm, dry       LABS:                        7.9    19.97 )-----------( 107      ( 08 Oct 2023 06:30 )             24.4     10-08    136  |  100  |  66<H>  ----------------------------<  128<H>  3.4<L>   |  31  |  4.80<H>    Ca    9.8      08 Oct 2023 06:30  Phos  3.0     10-08  Mg     1.8     10-08    TPro  5.9<L>  /  Alb  2.2<L>  /  TBili  0.6  /  DBili  x   /  AST  42<H>  /  ALT  27  /  AlkPhos  250<H>  10-08    PT/INR - ( 08 Oct 2023 06:30 )   PT: 12.1 sec;   INR: 1.06 ratio         PTT - ( 08 Oct 2023 06:30 )  PTT:30.8 sec  Urinalysis Basic - ( 08 Oct 2023 06:30 )    Color: x / Appearance: x / SG: x / pH: x  Gluc: 128 mg/dL / Ketone: x  / Bili: x / Urobili: x   Blood: x / Protein: x / Nitrite: x   Leuk Esterase: x / RBC: x / WBC x   Sq Epi: x / Non Sq Epi: x / Bacteria: x      MICROBIOLOGY:  Culture - Blood (10.06.23 @ 10:20)    Gram Stain:   Growth in aerobic bottle: Gram Positive Cocci in Clusters  Growth in anaerobic bottle: Gram Positive Cocci in Clusters   Specimen Source: .Blood Blood-Peripheral   Culture Results:   Growth in aerobic and anaerobic bottles: Methicillin Resistant  Staphylococcus aureus  See previous culture [id number]      RADIOLOGY & ADDITIONAL STUDIES:  < from: TTE Echo Complete w/o Contrast w/ Doppler (10.01.23 @ 10:26) >  Summary:   1. 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 Biatrial enlargement   4. Left ventricular ejection fraction, by visual estimation, is 45 to   50%.   5. Mild mitral annular calcification.   6. Mild-moderate tricuspid regurgitation.   7. Mild aortic valve stenosis.    < end of copied text >

## 2023-10-08 NOTE — SWALLOW BEDSIDE ASSESSMENT ADULT - COMMENTS
WBC: 19.97  CXR 10/6: No significant change. Moderate right pleural effusion. Cannot exclude underlying infiltrate or   atelectasis. Cardiomegaly. Right central line and enteric catheter in satisfactory position.  Head CT: 10/4 IMPRESSION:  No CT evidence of acute intracranial pathology.

## 2023-10-08 NOTE — PROGRESS NOTE ADULT - NS ATTEND AMEND GEN_ALL_CORE FT
Assessment  Septic shock, suspect line sepsis  MRSA blood stream infection  AMS- metabolic encephelopathy  Thrombocytopenia, suspect from Sepsis  Underlying h/o CAD s/p PCI, BPH, ESRD on HD, s/p MVR, Afib    Plan:  More interactive, following commands. Denies any pain.   Repeat blood cultures remains positive from 10/6  antibiotics as per ID, dose vancomycin post HD MWF  Add Ceftaroline   Repeat blood cultures in 48 hours to document clearance   Taper midodrine  Perm cath removed 10/3/2023   Thrombocytopenia improving, though holding full dose AC for now  ID, Renal, Heme f/u  HD as per schedule   Check Vancomycin level   On going discussion with the NOK, concern for possibly infective endocarditis given patient not clearing bacteremia.  Transfer out of ICU Assessment  Septic shock, suspect line sepsis  MRSA blood stream infection  AMS- metabolic encephelopathy  Thrombocytopenia, suspect from Sepsis  Underlying h/o CAD s/p PCI, BPH, ESRD on HD, s/p MVR, Afib    Plan:  More interactive, following commands. Denies any pain.   Repeat blood cultures remains positive from 10/6  antibiotics as per ID, dose vancomycin post HD MWF  Add Ceftaroline   Repeat blood cultures in 48 hours to document clearance   Taper midodrine  Perm cath removed 10/3/2023   Thrombocytopenia improving, though holding full dose AC for now  ID, Renal, Heme f/u  HD as per schedule   Check Vancomycin level   On going discussion with the NOK, concern for possibly infective endocarditis given patient not clearing bacteremia.  Transfer out of ICU - signed out to Dr. Dove

## 2023-10-08 NOTE — SWALLOW BEDSIDE ASSESSMENT ADULT - SLP PERTINENT HISTORY OF CURRENT PROBLEM
77 yo M pmhx ESRD came to ED complaining of fatigue. Patient noted to be febrile to 102.2 in ED with complains of cough and wheezing. Received 1l NS bolus and noted to be hypotensive to 80's systolic. At bedside patient is oriented to self and place. Complains of some abdominal pain but is unsure why he is in the hospital. Poor historian and unable to fully participate in ROS. admitted  with septic shock 2/2 PNA and MRSA bacteremia s/p tunneled HD cath removal by vascular 10/3, metabolic encephalopathy, thrombocytopenia. 79 yo M pmhx ESRD came to ED complaining of fatigue. Patient noted to be febrile to 102.2 in ED with complains of cough and wheezing. Received 1l NS bolus and noted to be hypotensive to 80's systolic. At bedside patient is oriented to self and place. Complains of some abdominal pain but is unsure why he is in the hospital. Poor historian and unable to fully participate in ROS. admitted  with septic shock 2/2 PNA and MRSA bacteremia s/p tunneled HD cath removal by vascular 10/3, metabolic encephalopathy, thrombocytopenia.

## 2023-10-08 NOTE — SWALLOW BEDSIDE ASSESSMENT ADULT - SWALLOW EVAL: RECOMMENDED FEEDING/EATING TECHNIQUES
allow for swallow between intakes/check mouth frequently for oral residue/pocketing/crush medication (when feasible)/hard swallow w/ each bite or sip/maintain upright posture during/after eating for 30 mins/oral hygiene/position upright (90 degrees)

## 2023-10-08 NOTE — SWALLOW BEDSIDE ASSESSMENT ADULT - SLP GENERAL OBSERVATIONS
Received in bed, confused, not oriented to location. Unable to consistently follow directives for purpose of OME.

## 2023-10-08 NOTE — SWALLOW BEDSIDE ASSESSMENT ADULT - SWALLOW EVAL: DIAGNOSIS
Patient presenting with clinical signs of oropharyngeal dysphagia likely due to acute illness, deconditioning and exacerbated by altered mental status. Received in bed, NG tube in nares. Accepted ice chips via teaspoon, thin liquids via consecutive straw sips, and puree consistency. Reduced stripping of bolus from spoon, adequate a-p transit of puree. Adequate extraction of liquids from straw. Functional oral clearance. Pharyngeal motor response appreciated. No clinical overt signs of aspiration following consecutive sips of thin liquids (3 ounces x2) or puree via teaspoon. Recommend initiate PO diet of puree and thin liquids

## 2023-10-08 NOTE — PROGRESS NOTE ADULT - SUBJECTIVE AND OBJECTIVE BOX
NEPHROLOGY PROGRESS NOTE    CHIEF COMPLAINT:  ESRD    HPI:      EXAM:  Vital Signs Last 24 Hrs  T(C): 36.6 (08 Oct 2023 10:00), Max: 36.9 (08 Oct 2023 06:00)  T(F): 97.9 (08 Oct 2023 10:00), Max: 98.4 (08 Oct 2023 06:00)  HR: 84 (08 Oct 2023 10:00) (64 - 100)  BP: 149/63 (08 Oct 2023 10:00) (105/46 - 149/63)  BP(mean): 86 (08 Oct 2023 10:00) (64 - 95)  RR: 24 (08 Oct 2023 10:00) (10 - 24)  SpO2: 94% (08 Oct 2023 10:00) (91% - 100%)    Parameters below as of 08 Oct 2023 10:00  Patient On (Oxygen Delivery Method): room air      I&O's Summary    07 Oct 2023 07:01  -  08 Oct 2023 07:00  --------------------------------------------------------  IN: 1485 mL / OUT: 0 mL / NET: 1485 mL      Daily     Daily Weight in k.6 (08 Oct 2023 05:00)    Incoherent  Normal respiratory effort, lungs clear bilaterally  Heart RRR with no murmur, +peripheral edema    LABS                        7.9    19.97 )-----------( 107      ( 08 Oct 2023 06:30 )             24.4     10-08    136  |  100  |  66<H>  ----------------------------<  128<H>  3.4<L>   |  31  |  4.80<H>    Ca    9.8      08 Oct 2023 06:30  Phos  3.0     10-08  Mg     1.8     10-08    TPro  5.9<L>  /  Alb  2.2<L>  /  TBili  0.6  /  DBili  x   /  AST  42<H>  /  ALT  27  /  AlkPhos  250<H>  1008      Impression/Plan  ESRD on HD at [facility] MWF via perm cath, Afib, CM, EF 45 - 50%  Has LUE AVF not yet mature   Adm 23 w/MRSA bacteremia, persistent as of 10/6 blood culture  Perm cath d/c-d 10/2/23 and temp line placed 10/4  Next hemodialysis ordered for Monday

## 2023-10-08 NOTE — PROGRESS NOTE ADULT - ASSESSMENT
77 yo m w/MHx of  Afib on Eliquis, CAD, prosthetic MVR, MSSA bacteremia in March of 2023, negative ERENDIRA for valvular lesion,  s/p Pacemaker lead extraction, leadless micra PPM placed. ESRD on HD via tunneled catheter admitted  with septic shock 2/2 PNA and MRSA bacteremia s/p tunneled HD cath removal by vascular 10/3, metabolic encephalopathy, thrombocytopenia.     Neuro   AMS ; Encephalopathy multifactorial in  setting of metabolic encephalopathy ,  sepsis with MRSA bacteremia   -improved ; now responsive and awake   -c/w neuro checks   -fall precaution   -PT   -will obtain speech and swallow eval     CV  Septic Shock in setting of MRSA  bacteremia   -resolved   -wean off  midodrine as tolerated   -concern for endocarditis with MRSA bacteremia ; TTE no vegetation . ID and cardiology  on board for further plan weather repeat ERENDIRA would be beneficial   TTE (10/1/23): 45 to 50%.  ERENDIRA 3/2024 negative ERENDIRA for valvular lesion    A fib   -rate controlled   Hx of CAD s/p PCI, s/p MVR, Afib  ECG (9/30/23): atrial fibrillation, PVC  -AC on hold 2/2 thrombocytopenia ; however platelets > 100 , improving may consider restarting AC if no plan for procedure and platelet count continue to improve     -c/w ASA 81 mg   -cardiology input appreciated       PULM  maintain o2sat > 94%   -aspiration precaution       GI   Severe Protein-Calorie Malnutrition   NPO with NGT ; tolerating TF   -nutritional consult   -will obtain speech and swallow   -aspiration precaution     hx of proctitis , hemorrhoids   -c/w bowel regimen   -c/w suppository ,         ESRD on HD now via R IJ DBL Sofía placed 10/4 in ICU   -HD plan M/W/F   nephro input appreciate it   -renally dose drugs  -avoid nephrotoxins  -daily weight      ID  MRSA blood stream infection  -s/p removal of PermCath 10/3 by vascular  -s/p HD cath Sofía placed 10/4 in ICU   -repeat Bcx 10/6 with MRSA   -new set of BC x 2 to be send 10/9   -c/w vancomycin on days of HD ;  obtain Vanco through level post HD tx , goal level 15-20, and administer dose post HXD tx   -repeat cx positive ; will add ceftaroline as per ID recs; renally dose 10/8-->   -ID input appreciated  -trend T curve   -trend WBC          HEME  Thrombocytopenia, suspect from Sepsis  -now improving   -plan to resume full AC ; over next day or so as long as no plan for invasive   -c/w ASA   -C/w heparin sq for DVT ppx   -bleeding precaution   -c/w epogen on days of HD tx     ENDO  Hypothyroidism   -c/w syhtroid   -obtain TSH in AM       PPX   DVT ppx ; heaprin sq   GI ppx ; PPI     ETHICS   On going discussion with the [facility], concern for possibly infective endocarditis given patient not clearing bacteremia.  Goals of Care: JULIANN from 8/30/23 Full code    DISPO   Pt stable to transfer to medical floor for further medical monitor g and treatment  79 yo m w/MHx of  Afib on Eliquis, CAD, prosthetic MVR, MSSA bacteremia in March of 2023, negative ERENDIRA for valvular lesion,  s/p Pacemaker lead extraction, leadless micra PPM placed. ESRD on HD via tunneled catheter admitted  with septic shock 2/2 PNA and MRSA bacteremia s/p tunneled HD cath removal by vascular 10/3, metabolic encephalopathy, thrombocytopenia.     Neuro   AMS ; Encephalopathy multifactorial in  setting of metabolic encephalopathy ,  sepsis with MRSA bacteremia   -improved ; now responsive and awake   -c/w neuro checks   -fall precaution   -PT   -will obtain speech and swallow eval     CV  Septic Shock in setting of MRSA  bacteremia   -resolved   -wean off  midodrine as tolerated   -concern for endocarditis with MRSA bacteremia ; TTE no vegetation . ID and cardiology  on board for further plan weather repeat ERENDIRA would be beneficial   TTE (10/1/23): 45 to 50%.  ERENDIRA 3/2024 negative ERENDIRA for valvular lesion    A fib   -rate controlled   Hx of CAD s/p PCI, s/p MVR, Afib  ECG (9/30/23): atrial fibrillation, PVC  -AC on hold 2/2 thrombocytopenia ; however platelets > 100 , improving may consider restarting AC if no plan for procedure and platelet count continue to improve     -c/w ASA 81 mg   -cardiology input appreciated       PULM  maintain o2sat > 94%   -aspiration precaution       GI   Severe Protein-Calorie Malnutrition   NPO with NGT ; tolerating TF   -nutritional consult   -will obtain speech and swallow   -aspiration precaution     hx of proctitis , hemorrhoids   -c/w bowel regimen   -c/w suppository ,         ESRD on HD now via R IJ DBL Sofía placed 10/4 in ICU   -HD plan M/W/F   nephro input appreciate it   -renally dose drugs  -avoid nephrotoxins  -daily weight      ID  MRSA blood stream infection  -s/p removal of PermCath 10/3 by vascular  -s/p HD cath Sofía placed 10/4 in ICU   -repeat Bcx 10/6 with MRSA   -new set of BC x 2 to be send 10/9   -c/w vancomycin on days of HD ;  obtain Vanco through level post HD tx , goal level 15-20, and administer dose post HXD tx   -repeat cx positive ; will add ceftaroline as per ID recs; renally dose 10/8-->   -ID input appreciated  -trend T curve   -trend WBC          HEME  Thrombocytopenia, suspect from Sepsis  -now improving   -plan to resume full AC ; over next day or so as long as no plan for invasive   -c/w ASA   -C/w heparin sq for DVT ppx   -bleeding precaution   -c/w epogen on days of HD tx     ENDO  Hypothyroidism   -c/w syhtroid   -obtain TSH in AM       PPX   DVT ppx ; heaprin sq   GI ppx ; PPI     ETHICS   On going discussion with the [facility], concern for possibly infective endocarditis given patient not clearing bacteremia.  Goals of Care: JULIANN from 8/30/23 Full code    DISPO   Pt stable to transfer to medical floor for further medical monitor g and treatment  79 yo m w/MHx of  Afib on Eliquis, CAD, prosthetic MVR, MSSA bacteremia in March of 2023, negative ERENDIRA for valvular lesion,  s/p Pacemaker lead extraction, leadless micra PPM placed. ESRD on HD via tunneled catheter admitted  with septic shock 2/2 PNA and MRSA bacteremia s/p tunneled HD cath removal by vascular 10/3, metabolic encephalopathy, thrombocytopenia.     Neuro   AMS ; Encephalopathy multifactorial in  setting of metabolic encephalopathy ,  sepsis with MRSA bacteremia   -improved ; now responsive and awake   -c/w neuro checks   -fall precaution   -PT   -will obtain speech and swallow eval     CV  Septic Shock in setting of MRSA  bacteremia   -shock resolved , however  ongoing MRSA bacteremia tx   -wean off  midodrine as tolerated   -concern for endocarditis with MRSA bacteremia ; TTE no vegetation . ID and cardiology  on board for further plan weather repeat ERENDIRA would be beneficial   TTE (10/1/23): 45 to 50%.  ERENDIRA 3/2024 negative ERENDIRA for valvular lesion    A fib   -rate controlled   Hx of CAD s/p PCI, s/p MVR, Afib  ECG (9/30/23): atrial fibrillation, PVC  -AC on hold 2/2 thrombocytopenia ; however platelets > 100 , improving may consider restarting AC if no plan for procedure and platelet count continue to improve     -c/w ASA 81 mg   -cardiology input appreciated       PULM  maintain o2sat > 94%   -aspiration precaution       GI   Severe Protein-Calorie Malnutrition   NPO with NGT ; tolerating TF   -nutritional consult   -will obtain speech and swallow   -aspiration precaution     hx of proctitis , hemorrhoids   -c/w bowel regimen   -c/w suppository ,         ESRD on HD now via R IJ DBL Sofía placed 10/4 in ICU   -HD plan M/W/F   nephro input appreciate it   -renally dose drugs  -avoid nephrotoxins  -daily weight      ID  MRSA blood stream infection  -s/p removal of PermCath 10/3 by vascular  -s/p HD cath Sofía placed 10/4 in ICU   -repeat Bcx 10/6 with MRSA   -new set of BC x 2 to be send 10/9   -c/w vancomycin on days of HD ;  obtain Vanco through level post HD tx , goal level 15-20, and administer dose post HXD tx   -repeat cx positive ; will add ceftaroline as per ID recs; renally dose 10/8-->   -ID input appreciated  -trend T curve   -trend WBC          HEME  Thrombocytopenia, suspect from Sepsis  -now improving   -plan to resume full AC ; over next day or so as long as no plan for invasive   -c/w ASA   -C/w heparin sq for DVT ppx   -bleeding precaution   -c/w epogen on days of HD tx     ENDO  Hypothyroidism   -c/w syhtroid   -obtain TSH in AM       PPX   DVT ppx ; heaprin sq   GI ppx ; PPI     ETHICS   On going discussion with the [facility], concern for possibly infective endocarditis given patient not clearing bacteremia.  Goals of Care: MOLST from 8/30/23 Full code    DISPO   Pt stable to transfer to medical floor for further medical monitor g and treatment  77 yo m w/MHx of  Afib on Eliquis, CAD, prosthetic MVR, MSSA bacteremia in March of 2023, negative ERENDIRA for valvular lesion,  s/p Pacemaker lead extraction, leadless micra PPM placed. ESRD on HD via tunneled catheter admitted  with septic shock 2/2 PNA and MRSA bacteremia s/p tunneled HD cath removal by vascular 10/3, metabolic encephalopathy, thrombocytopenia.     Neuro   AMS ; Encephalopathy multifactorial in  setting of metabolic encephalopathy ,  sepsis with MRSA bacteremia   -improved ; now responsive and awake   -c/w neuro checks   -fall precaution   -PT   -will obtain speech and swallow eval     CV  Septic Shock in setting of MRSA  bacteremia   -shock resolved , however  ongoing MRSA bacteremia tx   -wean off  midodrine as tolerated   -concern for endocarditis with MRSA bacteremia ; TTE no vegetation . ID and cardiology  on board for further plan weather repeat ERENDIRA would be beneficial   TTE (10/1/23): 45 to 50%.  ERENDIRA 3/2024 negative ERENDIRA for valvular lesion    A fib   -rate controlled   Hx of CAD s/p PCI, s/p MVR, Afib  ECG (9/30/23): atrial fibrillation, PVC  -AC on hold 2/2 thrombocytopenia ; however platelets > 100 , improving may consider restarting AC if no plan for procedure and platelet count continue to improve     -c/w ASA 81 mg   -cardiology input appreciated       PULM  maintain o2sat > 94%   -aspiration precaution       GI   Severe Protein-Calorie Malnutrition   NPO with NGT ; tolerating TF   -nutritional consult   -will obtain speech and swallow   -aspiration precaution     hx of proctitis , hemorrhoids   -c/w bowel regimen   -c/w suppository ,         ESRD on HD now via R IJ DBL Sofía placed 10/4 in ICU   -HD plan M/W/F   nephro input appreciate it   -renally dose drugs  -avoid nephrotoxins  -daily weight      ID  MRSA blood stream infection  -s/p removal of PermCath 10/3 by vascular  -s/p HD cath Sofía placed 10/4 in ICU   -repeat Bcx 10/6 with MRSA   -new set of BC x 2 to be send 10/9   -c/w vancomycin on days of HD ;  obtain Vanco through level post HD tx , goal level 15-20, and administer dose post HXD tx   -repeat cx positive ; will add ceftaroline as per ID recs; renally dose 10/8-->   -ID input appreciated  -trend T curve   -trend WBC          HEME  Thrombocytopenia, suspect from Sepsis  -now improving   -plan to resume full AC ; over next day or so as long as no plan for invasive   -c/w ASA   -C/w heparin sq for DVT ppx   -bleeding precaution   -c/w epogen on days of HD tx     ENDO  Hypothyroidism   -c/w syhtroid   -obtain TSH in AM       PPX   DVT ppx ; heaprin sq   GI ppx ; PPI     ETHICS   On going discussion with the [facility], concern for possibly infective endocarditis given patient not clearing bacteremia.  Goals of Care: MOLST from 8/30/23 Full code    DISPO   Pt stable to transfer to medical floor for further medical monitor g and treatment  77 yo m w/MHx of  Afib on Eliquis, CAD, prosthetic MVR, MSSA bacteremia in March of 2023, negative ERENDIRA for valvular lesion,  s/p Pacemaker lead extraction, leadless micra PPM placed. ESRD on HD via tunneled catheter admitted  with septic shock 2/2 PNA and MRSA bacteremia s/p tunneled HD cath removal by vascular 10/3, metabolic encephalopathy, thrombocytopenia.     Neuro   AMS ; Encephalopathy multifactorial in  setting of metabolic encephalopathy ,  sepsis with MRSA bacteremia   -improved ; now responsive and awake   -c/w neuro checks   -fall precaution   -PT   -will obtain speech and swallow eval     CV  Septic Shock in setting of MRSA  bacteremia   -shock resolved , however  ongoing MRSA bacteremia tx   -wean off  midodrine as tolerated   -concern for endocarditis with MRSA bacteremia ; TTE no vegetation . ID and cardiology  on board for further plan weather repeat ERENDIRA would be beneficial   TTE (10/1/23): 45 to 50%.  ERENDIRA 3/2024 negative ERENDIRA for valvular lesion    A fib   -rate controlled   Hx of CAD s/p PCI, s/p MVR, Afib  ECG (9/30/23): atrial fibrillation, PVC  -AC on hold 2/2 thrombocytopenia ; however platelets > 100 , improving may consider restarting AC if no plan for procedure and platelet count continue to improve     -c/w ASA 81 mg   -cardiology input appreciated       PULM  maintain o2sat > 94%   -aspiration precaution       GI   Severe Protein-Calorie Malnutrition   NPO with NGT ; tolerating TF   -nutritional consult   -will obtain speech and swallow   -aspiration precaution     hx of proctitis , hemorrhoids   -c/w bowel regimen   -c/w suppository ,         ESRD on HD now via R IJ DBL Sofía placed 10/4 in ICU   -HD plan M/W/F   nephro input appreciate it   -renally dose drugs  -avoid nephrotoxins  -daily weight      ID  MRSA blood stream infection  -s/p removal of PermCath 10/3 by vascular  -s/p HD cath Sofía placed 10/4 in ICU   -repeat Bcx 10/6 with MRSA   -new set of BC x 2 to be send 10/9   -c/w vancomycin on days of HD ;  obtain Vanco through level post HD tx , goal level 15-20, and administer dose post HXD tx   -repeat cx positive ; will add ceftaroline as per ID recs; renally dose 10/8-->   -ID input appreciated  -trend T curve   -trend WBC          HEME  Thrombocytopenia, suspect from Sepsis  -now improving   -plan to resume full AC ; over next day or so as long as no plan for invasive   -c/w ASA   -C/w heparin sq for DVT ppx   -bleeding precaution   -c/w epogen on days of HD tx     ENDO  Hypothyroidism   -c/w synthroid   -obtain TSH in AM       PPX   DVT ppx ; heparin sq   GI ppx ; PPI     ETHICS   On going discussion with the [facility], concern for possibly infective endocarditis given patient not clearing bacteremia.  Goals of Care: MOLST from 8/30/23 Full code    DISPO   Pt stable to transfer to medical floor for further medical monitor g and treatment  79 yo m w/MHx of  Afib on Eliquis, CAD, prosthetic MVR, MSSA bacteremia in March of 2023, negative ERENDIRA for valvular lesion,  s/p Pacemaker lead extraction, leadless micra PPM placed. ESRD on HD via tunneled catheter admitted  with septic shock 2/2 PNA and MRSA bacteremia s/p tunneled HD cath removal by vascular 10/3, metabolic encephalopathy, thrombocytopenia.     Neuro   AMS ; Encephalopathy multifactorial in  setting of metabolic encephalopathy ,  sepsis with MRSA bacteremia   -improved ; now responsive and awake   -c/w neuro checks   -fall precaution   -PT   -will obtain speech and swallow eval     CV  Septic Shock in setting of MRSA  bacteremia   -shock resolved , however  ongoing MRSA bacteremia tx   -wean off  midodrine as tolerated   -concern for endocarditis with MRSA bacteremia ; TTE no vegetation . ID and cardiology  on board for further plan weather repeat ERENDIRA would be beneficial   TTE (10/1/23): 45 to 50%.  ERENDIRA 3/2024 negative ERENDIRA for valvular lesion    A fib   -rate controlled   Hx of CAD s/p PCI, s/p MVR, Afib  ECG (9/30/23): atrial fibrillation, PVC  -AC on hold 2/2 thrombocytopenia ; however platelets > 100 , improving may consider restarting AC if no plan for procedure and platelet count continue to improve     -c/w ASA 81 mg   -cardiology input appreciated       PULM  maintain o2sat > 94%   -aspiration precaution       GI   Severe Protein-Calorie Malnutrition   NPO with NGT ; tolerating TF   -nutritional consult   -will obtain speech and swallow   -aspiration precaution     hx of proctitis , hemorrhoids   -c/w bowel regimen   -c/w suppository ,         ESRD on HD now via R IJ DBL Sofía placed 10/4 in ICU   -HD plan M/W/F   nephro input appreciate it   -renally dose drugs  -avoid nephrotoxins  -daily weight      ID  MRSA blood stream infection  -s/p removal of PermCath 10/3 by vascular  -s/p HD cath Sofía placed 10/4 in ICU   -repeat Bcx 10/6 with MRSA   -new set of BC x 2 to be send 10/9   -c/w vancomycin on days of HD ;  obtain Vanco through level post HD tx , goal level 15-20, and administer dose post HXD tx   -repeat cx positive ; will add ceftaroline as per ID recs; renally dose 10/8-->   -ID input appreciated  -trend T curve   -trend WBC          HEME  Thrombocytopenia, suspect from Sepsis  -now improving   -plan to resume full AC ; over next day or so as long as no plan for invasive   -c/w ASA   -C/w heparin sq for DVT ppx   -bleeding precaution   -c/w epogen on days of HD tx     ENDO  Hypothyroidism   -c/w synthroid   -obtain TSH in AM       PPX   DVT ppx ; heparin sq   GI ppx ; PPI     ETHICS   On going discussion with the [facility], concern for possibly infective endocarditis given patient not clearing bacteremia.  Goals of Care: MOLST from 8/30/23 Full code    DISPO   Pt stable to transfer to medical floor for further medical monitor g and treatment

## 2023-10-09 LAB
ALBUMIN SERPL ELPH-MCNC: 2.1 G/DL — LOW (ref 3.3–5)
ALP SERPL-CCNC: 243 U/L — HIGH (ref 40–120)
ALT FLD-CCNC: 27 U/L — SIGNIFICANT CHANGE UP (ref 10–45)
ANION GAP SERPL CALC-SCNC: 9 MMOL/L — SIGNIFICANT CHANGE UP (ref 5–17)
AST SERPL-CCNC: 25 U/L — SIGNIFICANT CHANGE UP (ref 10–40)
BASOPHILS # BLD AUTO: 0.05 K/UL — SIGNIFICANT CHANGE UP (ref 0–0.2)
BASOPHILS NFR BLD AUTO: 0.3 % — SIGNIFICANT CHANGE UP (ref 0–2)
BILIRUB SERPL-MCNC: 0.8 MG/DL — SIGNIFICANT CHANGE UP (ref 0.2–1.2)
BUN SERPL-MCNC: 82 MG/DL — HIGH (ref 7–23)
CALCIUM SERPL-MCNC: 10.1 MG/DL — SIGNIFICANT CHANGE UP (ref 8.4–10.5)
CHLORIDE SERPL-SCNC: 98 MMOL/L — SIGNIFICANT CHANGE UP (ref 96–108)
CO2 SERPL-SCNC: 27 MMOL/L — SIGNIFICANT CHANGE UP (ref 22–31)
CREAT SERPL-MCNC: 5.27 MG/DL — HIGH (ref 0.5–1.3)
EGFR: 11 ML/MIN/1.73M2 — LOW
EOSINOPHIL # BLD AUTO: 0.19 K/UL — SIGNIFICANT CHANGE UP (ref 0–0.5)
EOSINOPHIL NFR BLD AUTO: 1.1 % — SIGNIFICANT CHANGE UP (ref 0–6)
GLUCOSE SERPL-MCNC: 96 MG/DL — SIGNIFICANT CHANGE UP (ref 70–99)
HCT VFR BLD CALC: 22.2 % — LOW (ref 39–50)
HGB BLD-MCNC: 7.8 G/DL — LOW (ref 13–17)
IMM GRANULOCYTES NFR BLD AUTO: 1.1 % — HIGH (ref 0–0.9)
LYMPHOCYTES # BLD AUTO: 1.1 K/UL — SIGNIFICANT CHANGE UP (ref 1–3.3)
LYMPHOCYTES # BLD AUTO: 6.1 % — LOW (ref 13–44)
MAGNESIUM SERPL-MCNC: 1.9 MG/DL — SIGNIFICANT CHANGE UP (ref 1.6–2.6)
MCHC RBC-ENTMCNC: 31.3 PG — SIGNIFICANT CHANGE UP (ref 27–34)
MCHC RBC-ENTMCNC: 35.1 GM/DL — SIGNIFICANT CHANGE UP (ref 32–36)
MCV RBC AUTO: 89.2 FL — SIGNIFICANT CHANGE UP (ref 80–100)
MONOCYTES # BLD AUTO: 1.14 K/UL — HIGH (ref 0–0.9)
MONOCYTES NFR BLD AUTO: 6.3 % — SIGNIFICANT CHANGE UP (ref 2–14)
NEUTROPHILS # BLD AUTO: 15.38 K/UL — HIGH (ref 1.8–7.4)
NEUTROPHILS NFR BLD AUTO: 85.1 % — HIGH (ref 43–77)
NRBC # BLD: 0 /100 WBCS — SIGNIFICANT CHANGE UP (ref 0–0)
PHOSPHATE SERPL-MCNC: 3.2 MG/DL — SIGNIFICANT CHANGE UP (ref 2.5–4.5)
PLATELET # BLD AUTO: 111 K/UL — LOW (ref 150–400)
POTASSIUM SERPL-MCNC: 4.1 MMOL/L — SIGNIFICANT CHANGE UP (ref 3.5–5.3)
POTASSIUM SERPL-SCNC: 4.1 MMOL/L — SIGNIFICANT CHANGE UP (ref 3.5–5.3)
PROT SERPL-MCNC: 6 G/DL — SIGNIFICANT CHANGE UP (ref 6–8.3)
RBC # BLD: 2.49 M/UL — LOW (ref 4.2–5.8)
RBC # FLD: 16 % — HIGH (ref 10.3–14.5)
SODIUM SERPL-SCNC: 134 MMOL/L — LOW (ref 135–145)
TSH SERPL-MCNC: 31.92 UIU/ML — HIGH (ref 0.36–3.74)
WBC # BLD: 18.06 K/UL — HIGH (ref 3.8–10.5)
WBC # FLD AUTO: 18.06 K/UL — HIGH (ref 3.8–10.5)

## 2023-10-09 PROCEDURE — 99233 SBSQ HOSP IP/OBS HIGH 50: CPT

## 2023-10-09 PROCEDURE — 99232 SBSQ HOSP IP/OBS MODERATE 35: CPT

## 2023-10-09 PROCEDURE — 99497 ADVNCD CARE PLAN 30 MIN: CPT

## 2023-10-09 RX ORDER — CEFTAROLINE FOSAMIL 600 MG/20ML
POWDER, FOR SOLUTION INTRAVENOUS
Refills: 0 | Status: DISCONTINUED | OUTPATIENT
Start: 2023-10-09 | End: 2023-10-14

## 2023-10-09 RX ORDER — APIXABAN 2.5 MG/1
5 TABLET, FILM COATED ORAL
Refills: 0 | Status: DISCONTINUED | OUTPATIENT
Start: 2023-10-09 | End: 2023-10-09

## 2023-10-09 RX ORDER — CEFTAROLINE FOSAMIL 600 MG/20ML
200 POWDER, FOR SOLUTION INTRAVENOUS EVERY 8 HOURS
Refills: 0 | Status: DISCONTINUED | OUTPATIENT
Start: 2023-10-09 | End: 2023-10-14

## 2023-10-09 RX ORDER — DAPTOMYCIN 500 MG/10ML
720 INJECTION, POWDER, LYOPHILIZED, FOR SOLUTION INTRAVENOUS
Refills: 0 | Status: DISCONTINUED | OUTPATIENT
Start: 2023-10-09 | End: 2023-10-09

## 2023-10-09 RX ORDER — CEFTAROLINE FOSAMIL 600 MG/20ML
200 POWDER, FOR SOLUTION INTRAVENOUS ONCE
Refills: 0 | Status: COMPLETED | OUTPATIENT
Start: 2023-10-09 | End: 2023-10-09

## 2023-10-09 RX ORDER — LEVOTHYROXINE SODIUM 125 MCG
150 TABLET ORAL DAILY
Refills: 0 | Status: DISCONTINUED | OUTPATIENT
Start: 2023-10-10 | End: 2023-10-14

## 2023-10-09 RX ADMIN — POLYETHYLENE GLYCOL 3350 17 GRAM(S): 17 POWDER, FOR SOLUTION ORAL at 13:17

## 2023-10-09 RX ADMIN — SENNA PLUS 2 TABLET(S): 8.6 TABLET ORAL at 21:42

## 2023-10-09 RX ADMIN — APIXABAN 5 MILLIGRAM(S): 2.5 TABLET, FILM COATED ORAL at 18:38

## 2023-10-09 RX ADMIN — Medication 81 MILLIGRAM(S): at 13:10

## 2023-10-09 RX ADMIN — Medication 250 MILLIGRAM(S): at 13:10

## 2023-10-09 RX ADMIN — MIDODRINE HYDROCHLORIDE 10 MILLIGRAM(S): 2.5 TABLET ORAL at 13:10

## 2023-10-09 RX ADMIN — Medication 137 MICROGRAM(S): at 05:23

## 2023-10-09 RX ADMIN — Medication 1000 MILLIGRAM(S): at 21:41

## 2023-10-09 RX ADMIN — PANTOPRAZOLE SODIUM 40 MILLIGRAM(S): 20 TABLET, DELAYED RELEASE ORAL at 13:10

## 2023-10-09 RX ADMIN — HEPARIN SODIUM 5000 UNIT(S): 5000 INJECTION INTRAVENOUS; SUBCUTANEOUS at 05:24

## 2023-10-09 RX ADMIN — CEFTAROLINE FOSAMIL 50 MILLIGRAM(S): 600 POWDER, FOR SOLUTION INTRAVENOUS at 21:52

## 2023-10-09 RX ADMIN — MIDODRINE HYDROCHLORIDE 10 MILLIGRAM(S): 2.5 TABLET ORAL at 21:42

## 2023-10-09 RX ADMIN — CEFTAROLINE FOSAMIL 50 MILLIGRAM(S): 600 POWDER, FOR SOLUTION INTRAVENOUS at 14:40

## 2023-10-09 RX ADMIN — Medication 1 MILLIGRAM(S): at 13:10

## 2023-10-09 RX ADMIN — ERYTHROPOIETIN 10000 UNIT(S): 10000 INJECTION, SOLUTION INTRAVENOUS; SUBCUTANEOUS at 10:22

## 2023-10-09 RX ADMIN — Medication 1 SUPPOSITORY(S): at 18:38

## 2023-10-09 RX ADMIN — MIDODRINE HYDROCHLORIDE 10 MILLIGRAM(S): 2.5 TABLET ORAL at 05:23

## 2023-10-09 RX ADMIN — Medication 1 SUPPOSITORY(S): at 05:23

## 2023-10-09 RX ADMIN — CHLORHEXIDINE GLUCONATE 1 APPLICATION(S): 213 SOLUTION TOPICAL at 05:22

## 2023-10-09 NOTE — PROGRESS NOTE ADULT - SUBJECTIVE AND OBJECTIVE BOX
Patient is a 78M h/o ESRD, Afib on Eliquis, CAD s/p PCI, prosthetic s/p MVR, h/o proctitis, hemorrhoids, hypothyroidism was in ICU for metabolic encephalopathy, septic shock 2/2 MRSA pneumonia s/p tunnel cath removal 10/3, IJ Shiley placed 10/4, downgraded to 3E.      INTERVAL HPI/OVERNIGHT EVENTS: Patient seen and examined at bedside. No overnight events.    MEDICATIONS  (STANDING):  aspirin  chewable 81 milliGRAM(s) Oral daily  ceftaroline fosamil IVPB 200 milliGRAM(s) IV Intermittent every 8 hours  ceftaroline fosamil IVPB 200 milliGRAM(s) IV Intermittent once  ceftaroline fosamil IVPB      chlorhexidine 2% Cloths 1 Application(s) Topical <User Schedule>  epoetin val (PROCRIT) Injectable 04897 Unit(s) IV Push <User Schedule>  folic acid 1 milliGRAM(s) Oral daily  heparin   Injectable 5000 Unit(s) SubCutaneous every 12 hours  hydrocortisone hemorrhoidal Suppository 1 Suppository(s) Rectal two times a day  influenza  Vaccine (HIGH DOSE) 0.7 milliLiter(s) IntraMuscular once  levothyroxine 137 MICROGram(s) Oral daily  mesalamine Suppository 1000 milliGRAM(s) Rectal at bedtime  midodrine 10 milliGRAM(s) Oral every 8 hours  pantoprazole   Suspension 40 milliGRAM(s) Enteral Tube daily  polyethylene glycol 3350 17 Gram(s) Oral daily  senna 2 Tablet(s) Oral at bedtime  vancomycin  IVPB 750 milliGRAM(s) IV Intermittent <User Schedule>    MEDICATIONS  (PRN):  acetaminophen     Tablet .. 650 milliGRAM(s) Oral every 6 hours PRN Mild Pain (1 - 3)  aluminum hydroxide/magnesium hydroxide/simethicone Suspension 10 milliLiter(s) Oral every 6 hours PRN dyspepsia  sodium chloride 0.9% lock flush 10 milliLiter(s) IV Push every 1 hour PRN Pre/post blood products, medications, blood draw, and to maintain line patency      Allergies    levofloxacin (Unknown)  alfuzosin (Unknown)  tamsulosin (Unknown)  Myrbetriq (Unknown)    Intolerances      Vital Signs Last 24 Hrs  T(C): 36.5 (09 Oct 2023 11:40), Max: 37.7 (08 Oct 2023 21:19)  T(F): 97.7 (09 Oct 2023 11:40), Max: 99.9 (08 Oct 2023 21:19)  HR: 82 (09 Oct 2023 11:40) (74 - 105)  BP: 96/62 (09 Oct 2023 11:40) (96/62 - 130/61)  BP(mean): 70 (09 Oct 2023 11:40) (70 - 80)  RR: 18 (09 Oct 2023 11:40) (14 - 19)  SpO2: 96% (09 Oct 2023 11:40) (93% - 98%)    Parameters below as of 09 Oct 2023 11:40  Patient On (Oxygen Delivery Method): room air      I&O's Summary    08 Oct 2023 07:01  -  09 Oct 2023 07:00  --------------------------------------------------------  IN: 310 mL / OUT: 0 mL / NET: 310 mL          PHYSICAL EXAM:  GENERAL: NAD  HEENT:  AT/NC, moist mucous membranes  CHEST/LUNG:  CTA b/l, no rales, wheezes, or rhonchi,  normal respiratory effort, no intercostal retractions  HEART:  irregular HR, S1, S2; no pitting edema  ABDOMEN:  BS+, soft, nontender, nondistended  MSK/EXTREMITIES: 2+ peripheral pulses, b/l upper ext edema, nontender, edema rt foot  NERVOUS SYSTEM: awakes to name      LABS: Personally reviewed  CBC                        7.8    18.06 )-----------( 111      ( 09 Oct 2023 07:00 )             22.2     CMP  10-09    134  |  98  |  82  ----------------------------<  96  4.1   |  27  |  5.27    Ca    10.1      09 Oct 2023 07:00  Phos  3.2     10-09  Mg     1.9     10-09    TPro  6.0  /  Alb  2.1  /  TBili  0.8  /  DBili  x   /  AST  25  /  ALT  27  /  AlkPhos  243  10-09          PT/INR - ( 08 Oct 2023 06:30 )   PT: 12.1 sec;   INR: 1.06 ratio         PTT - ( 08 Oct 2023 06:30 )  PTT:30.8 sec            TSH 31.921   TSH with FT4 reflex --  Total T3 --                  Urinalysis Basic - ( 09 Oct 2023 07:00 )    Color: x / Appearance: x / SG: x / pH: x  Gluc: 96 mg/dL / Ketone: x  / Bili: x / Urobili: x   Blood: x / Protein: x / Nitrite: x   Leuk Esterase: x / RBC: x / WBC x   Sq Epi: x / Non Sq Epi: x / Bacteria: x        Culture - Blood (collected 06 Oct 2023 10:20)  Source: .Blood Blood-Peripheral  Gram Stain (07 Oct 2023 04:46):    Growth in aerobic bottle: Gram Positive Cocci in Clusters    Growth in anaerobic bottle: Gram Positive Cocci in Clusters  Final Report (08 Oct 2023 08:18):    Growth in aerobic and anaerobic bottles: Methicillin Resistant    Staphylococcus aureus    See previous culture [id number]    Culture - Blood (collected 04 Oct 2023 06:00)  Source: .Blood Blood  Gram Stain (05 Oct 2023 07:40):    Growth in aerobic bottle: Gram Positive Cocci in Clusters    Growth in anaerobic bottle: Gram Positive Cocci in Clusters  Final Report (06 Oct 2023 14:49):    Growth in aerobic and anaerobic bottles: Methicillin Resistant    Staphylococcus aureus    See previous culture [id number]    Culture - Blood (collected 04 Oct 2023 06:00)  Source: .Blood Blood  Gram Stain (05 Oct 2023 08:18):    Growth in aerobic bottle: Gram Positive Cocci in Clusters    Growth in anaerobic bottle: Gram Positive Cocci in Clusters  Final Report (06 Oct 2023 14:47):    Growth in aerobic and anaerobic bottles: Methicillin Resistant    Staphylococcus aureus  Organism: Methicillin resistant Staphylococcus aureus (06 Oct 2023 14:47)  Organism: Methicillin resistant Staphylococcus aureus (06 Oct 2023 14:47)      Method Type: LEWIS      -  Ampicillin/Sulbactam: R <=8/4      -  Cefazolin: R >16      -  Clindamycin: R >4      -  Daptomycin: S 1      -  Erythromycin: R >4      -  Gentamicin: S <=1 Should not be used as monotherapy      -  Linezolid: S 2      -  Oxacillin: R >2      -  Penicillin: R 8      -  Rifampin: S <=1 Should not be used as monotherapy      -  Tetracycline: S 2      -  Trimethoprim/Sulfamethoxazole: S <=0.5/9.5      -  Vancomycin: S 2    Culture - Catheter (collected 02 Oct 2023 16:21)  Source: .Catheter + MRSA Bacterimia Permacath Removal Aerobic/Anaerobic Right  IJ Permacath TIP  Final Report (05 Oct 2023 14:50):    Greater than or equal to 15 Colonies Methicillin Resistant Staphylococcus    aureus  Organism: Methicillin resistant Staphylococcus aureus (05 Oct 2023 14:50)  Organism: Methicillin resistant Staphylococcus aureus (05 Oct 2023 14:50)      Method Type: LEWIS      -  Ampicillin/Sulbactam: R 16/8      -  Cefazolin: R >16      -  Clindamycin: R >4      -  Daptomycin: S 1      -  Erythromycin: R >4      -  Gentamicin: S <=1 Should not be used as monotherapy      -  Linezolid: S 4      -  Oxacillin: R >2      -  Penicillin: R >8      -  Rifampin: S <=1 Should not be used as monotherapy      -  Tetracycline: S 2      -  Trimethoprim/Sulfamethoxazole: S <=0.5/9.5      -  Vancomycin: S 2            Culture - Blood (collected 10-06-23 @ 10:20)  Source: .Blood Blood-Peripheral  Gram Stain (10-07-23 @ 04:46):    Growth in aerobic bottle: Gram Positive Cocci in Clusters    Growth in anaerobic bottle: Gram Positive Cocci in Clusters  Final Report (10-08-23 @ 08:18):    Growth in aerobic and anaerobic bottles: Methicillin Resistant    Staphylococcus aureus    See previous culture [id number]    Culture - Blood (collected 10-04-23 @ 06:00)  Source: .Blood Blood  Gram Stain (10-05-23 @ 07:40):    Growth in aerobic bottle: Gram Positive Cocci in Clusters    Growth in anaerobic bottle: Gram Positive Cocci in Clusters  Final Report (10-06-23 @ 14:49):    Growth in aerobic and anaerobic bottles: Methicillin Resistant    Staphylococcus aureus    See previous culture [id number]    Culture - Blood (collected 10-04-23 @ 06:00)  Source: .Blood Blood  Gram Stain (10-05-23 @ 08:18):    Growth in aerobic bottle: Gram Positive Cocci in Clusters    Growth in anaerobic bottle: Gram Positive Cocci in Clusters  Final Report (10-06-23 @ 14:47):    Growth in aerobic and anaerobic bottles: Methicillin Resistant    Staphylococcus aureus  Organism: Methicillin resistant Staphylococcus aureus (10-06-23 @ 14:47)  Organism: Methicillin resistant Staphylococcus aureus (10-06-23 @ 14:47)      Method Type: LEWIS      -  Ampicillin/Sulbactam: R <=8/4      -  Cefazolin: R >16      -  Clindamycin: R >4      -  Daptomycin: S 1      -  Erythromycin: R >4      -  Gentamicin: S <=1 Should not be used as monotherapy      -  Linezolid: S 2      -  Oxacillin: R >2      -  Penicillin: R 8      -  Rifampin: S <=1 Should not be used as monotherapy      -  Tetracycline: S 2      -  Trimethoprim/Sulfamethoxazole: S <=0.5/9.5      -  Vancomycin: S 2    Culture - Catheter (collected 10-02-23 @ 16:21)  Source: .Catheter + MRSA Bacterimia Permacath Removal Aerobic/Anaerobic Right  IJ Permacath TIP  Final Report (10-05-23 @ 14:50):    Greater than or equal to 15 Colonies Methicillin Resistant Staphylococcus    aureus  Organism: Methicillin resistant Staphylococcus aureus (10-05-23 @ 14:50)  Organism: Methicillin resistant Staphylococcus aureus (10-05-23 @ 14:50)      Method Type: LEWIS      -  Ampicillin/Sulbactam: R 16/8      -  Cefazolin: R >16      -  Clindamycin: R >4      -  Daptomycin: S 1      -  Erythromycin: R >4      -  Gentamicin: S <=1 Should not be used as monotherapy      -  Linezolid: S 4      -  Oxacillin: R >2      -  Penicillin: R >8      -  Rifampin: S <=1 Should not be used as monotherapy      -  Tetracycline: S 2      -  Trimethoprim/Sulfamethoxazole: S <=0.5/9.5      -  Vancomycin: S 2        RADIOLOGY & ADDITIONAL TESTS: Personally reviewed.     Consultant(s) Notes Reviewed:  [x] YES  [ ] NO   Discussed with SW/PEPPER, RN     Patient is a 78M h/o ESRD, Afib on Eliquis, CAD s/p PCI, prosthetic s/p MVR, h/o proctitis, hemorrhoids, hypothyroidism was in ICU for metabolic encephalopathy, septic shock 2/2 MRSA pneumonia s/p tunnel cath removal 10/3, IJ Shiley placed 10/4, downgraded to 3E.      INTERVAL HPI/OVERNIGHT EVENTS: Patient seen and examined at bedside. No overnight events.    MEDICATIONS  (STANDING):  aspirin  chewable 81 milliGRAM(s) Oral daily  ceftaroline fosamil IVPB 200 milliGRAM(s) IV Intermittent every 8 hours  ceftaroline fosamil IVPB 200 milliGRAM(s) IV Intermittent once  ceftaroline fosamil IVPB      chlorhexidine 2% Cloths 1 Application(s) Topical <User Schedule>  epoetin val (PROCRIT) Injectable 40842 Unit(s) IV Push <User Schedule>  folic acid 1 milliGRAM(s) Oral daily  heparin   Injectable 5000 Unit(s) SubCutaneous every 12 hours  hydrocortisone hemorrhoidal Suppository 1 Suppository(s) Rectal two times a day  influenza  Vaccine (HIGH DOSE) 0.7 milliLiter(s) IntraMuscular once  levothyroxine 137 MICROGram(s) Oral daily  mesalamine Suppository 1000 milliGRAM(s) Rectal at bedtime  midodrine 10 milliGRAM(s) Oral every 8 hours  pantoprazole   Suspension 40 milliGRAM(s) Enteral Tube daily  polyethylene glycol 3350 17 Gram(s) Oral daily  senna 2 Tablet(s) Oral at bedtime  vancomycin  IVPB 750 milliGRAM(s) IV Intermittent <User Schedule>    MEDICATIONS  (PRN):  acetaminophen     Tablet .. 650 milliGRAM(s) Oral every 6 hours PRN Mild Pain (1 - 3)  aluminum hydroxide/magnesium hydroxide/simethicone Suspension 10 milliLiter(s) Oral every 6 hours PRN dyspepsia  sodium chloride 0.9% lock flush 10 milliLiter(s) IV Push every 1 hour PRN Pre/post blood products, medications, blood draw, and to maintain line patency      Allergies    levofloxacin (Unknown)  alfuzosin (Unknown)  tamsulosin (Unknown)  Myrbetriq (Unknown)    Intolerances      Vital Signs Last 24 Hrs  T(C): 36.5 (09 Oct 2023 11:40), Max: 37.7 (08 Oct 2023 21:19)  T(F): 97.7 (09 Oct 2023 11:40), Max: 99.9 (08 Oct 2023 21:19)  HR: 82 (09 Oct 2023 11:40) (74 - 105)  BP: 96/62 (09 Oct 2023 11:40) (96/62 - 130/61)  BP(mean): 70 (09 Oct 2023 11:40) (70 - 80)  RR: 18 (09 Oct 2023 11:40) (14 - 19)  SpO2: 96% (09 Oct 2023 11:40) (93% - 98%)    Parameters below as of 09 Oct 2023 11:40  Patient On (Oxygen Delivery Method): room air      I&O's Summary    08 Oct 2023 07:01  -  09 Oct 2023 07:00  --------------------------------------------------------  IN: 310 mL / OUT: 0 mL / NET: 310 mL          PHYSICAL EXAM:  GENERAL: NAD  HEENT:  AT/NC, moist mucous membranes  CHEST/LUNG:  CTA b/l, no rales, wheezes, or rhonchi,  normal respiratory effort, no intercostal retractions  HEART:  irregular HR, S1, S2; no pitting edema  ABDOMEN:  BS+, soft, nontender, nondistended  MSK/EXTREMITIES: 2+ peripheral pulses, b/l upper ext edema, nontender, edema rt foot  NERVOUS SYSTEM: awakes to name      LABS: Personally reviewed  CBC                        7.8    18.06 )-----------( 111      ( 09 Oct 2023 07:00 )             22.2     CMP  10-09    134  |  98  |  82  ----------------------------<  96  4.1   |  27  |  5.27    Ca    10.1      09 Oct 2023 07:00  Phos  3.2     10-09  Mg     1.9     10-09    TPro  6.0  /  Alb  2.1  /  TBili  0.8  /  DBili  x   /  AST  25  /  ALT  27  /  AlkPhos  243  10-09          PT/INR - ( 08 Oct 2023 06:30 )   PT: 12.1 sec;   INR: 1.06 ratio         PTT - ( 08 Oct 2023 06:30 )  PTT:30.8 sec            TSH 31.921   TSH with FT4 reflex --  Total T3 --                  Urinalysis Basic - ( 09 Oct 2023 07:00 )    Color: x / Appearance: x / SG: x / pH: x  Gluc: 96 mg/dL / Ketone: x  / Bili: x / Urobili: x   Blood: x / Protein: x / Nitrite: x   Leuk Esterase: x / RBC: x / WBC x   Sq Epi: x / Non Sq Epi: x / Bacteria: x        Culture - Blood (collected 06 Oct 2023 10:20)  Source: .Blood Blood-Peripheral  Gram Stain (07 Oct 2023 04:46):    Growth in aerobic bottle: Gram Positive Cocci in Clusters    Growth in anaerobic bottle: Gram Positive Cocci in Clusters  Final Report (08 Oct 2023 08:18):    Growth in aerobic and anaerobic bottles: Methicillin Resistant    Staphylococcus aureus    See previous culture [id number]    Culture - Blood (collected 04 Oct 2023 06:00)  Source: .Blood Blood  Gram Stain (05 Oct 2023 07:40):    Growth in aerobic bottle: Gram Positive Cocci in Clusters    Growth in anaerobic bottle: Gram Positive Cocci in Clusters  Final Report (06 Oct 2023 14:49):    Growth in aerobic and anaerobic bottles: Methicillin Resistant    Staphylococcus aureus    See previous culture [id number]    Culture - Blood (collected 04 Oct 2023 06:00)  Source: .Blood Blood  Gram Stain (05 Oct 2023 08:18):    Growth in aerobic bottle: Gram Positive Cocci in Clusters    Growth in anaerobic bottle: Gram Positive Cocci in Clusters  Final Report (06 Oct 2023 14:47):    Growth in aerobic and anaerobic bottles: Methicillin Resistant    Staphylococcus aureus  Organism: Methicillin resistant Staphylococcus aureus (06 Oct 2023 14:47)  Organism: Methicillin resistant Staphylococcus aureus (06 Oct 2023 14:47)      Method Type: LEWIS      -  Ampicillin/Sulbactam: R <=8/4      -  Cefazolin: R >16      -  Clindamycin: R >4      -  Daptomycin: S 1      -  Erythromycin: R >4      -  Gentamicin: S <=1 Should not be used as monotherapy      -  Linezolid: S 2      -  Oxacillin: R >2      -  Penicillin: R 8      -  Rifampin: S <=1 Should not be used as monotherapy      -  Tetracycline: S 2      -  Trimethoprim/Sulfamethoxazole: S <=0.5/9.5      -  Vancomycin: S 2    Culture - Catheter (collected 02 Oct 2023 16:21)  Source: .Catheter + MRSA Bacterimia Permacath Removal Aerobic/Anaerobic Right  IJ Permacath TIP  Final Report (05 Oct 2023 14:50):    Greater than or equal to 15 Colonies Methicillin Resistant Staphylococcus    aureus  Organism: Methicillin resistant Staphylococcus aureus (05 Oct 2023 14:50)  Organism: Methicillin resistant Staphylococcus aureus (05 Oct 2023 14:50)      Method Type: LEWIS      -  Ampicillin/Sulbactam: R 16/8      -  Cefazolin: R >16      -  Clindamycin: R >4      -  Daptomycin: S 1      -  Erythromycin: R >4      -  Gentamicin: S <=1 Should not be used as monotherapy      -  Linezolid: S 4      -  Oxacillin: R >2      -  Penicillin: R >8      -  Rifampin: S <=1 Should not be used as monotherapy      -  Tetracycline: S 2      -  Trimethoprim/Sulfamethoxazole: S <=0.5/9.5      -  Vancomycin: S 2            Culture - Blood (collected 10-06-23 @ 10:20)  Source: .Blood Blood-Peripheral  Gram Stain (10-07-23 @ 04:46):    Growth in aerobic bottle: Gram Positive Cocci in Clusters    Growth in anaerobic bottle: Gram Positive Cocci in Clusters  Final Report (10-08-23 @ 08:18):    Growth in aerobic and anaerobic bottles: Methicillin Resistant    Staphylococcus aureus    See previous culture [id number]    Culture - Blood (collected 10-04-23 @ 06:00)  Source: .Blood Blood  Gram Stain (10-05-23 @ 07:40):    Growth in aerobic bottle: Gram Positive Cocci in Clusters    Growth in anaerobic bottle: Gram Positive Cocci in Clusters  Final Report (10-06-23 @ 14:49):    Growth in aerobic and anaerobic bottles: Methicillin Resistant    Staphylococcus aureus    See previous culture [id number]    Culture - Blood (collected 10-04-23 @ 06:00)  Source: .Blood Blood  Gram Stain (10-05-23 @ 08:18):    Growth in aerobic bottle: Gram Positive Cocci in Clusters    Growth in anaerobic bottle: Gram Positive Cocci in Clusters  Final Report (10-06-23 @ 14:47):    Growth in aerobic and anaerobic bottles: Methicillin Resistant    Staphylococcus aureus  Organism: Methicillin resistant Staphylococcus aureus (10-06-23 @ 14:47)  Organism: Methicillin resistant Staphylococcus aureus (10-06-23 @ 14:47)      Method Type: LEWIS      -  Ampicillin/Sulbactam: R <=8/4      -  Cefazolin: R >16      -  Clindamycin: R >4      -  Daptomycin: S 1      -  Erythromycin: R >4      -  Gentamicin: S <=1 Should not be used as monotherapy      -  Linezolid: S 2      -  Oxacillin: R >2      -  Penicillin: R 8      -  Rifampin: S <=1 Should not be used as monotherapy      -  Tetracycline: S 2      -  Trimethoprim/Sulfamethoxazole: S <=0.5/9.5      -  Vancomycin: S 2    Culture - Catheter (collected 10-02-23 @ 16:21)  Source: .Catheter + MRSA Bacterimia Permacath Removal Aerobic/Anaerobic Right  IJ Permacath TIP  Final Report (10-05-23 @ 14:50):    Greater than or equal to 15 Colonies Methicillin Resistant Staphylococcus    aureus  Organism: Methicillin resistant Staphylococcus aureus (10-05-23 @ 14:50)  Organism: Methicillin resistant Staphylococcus aureus (10-05-23 @ 14:50)      Method Type: LEWIS      -  Ampicillin/Sulbactam: R 16/8      -  Cefazolin: R >16      -  Clindamycin: R >4      -  Daptomycin: S 1      -  Erythromycin: R >4      -  Gentamicin: S <=1 Should not be used as monotherapy      -  Linezolid: S 4      -  Oxacillin: R >2      -  Penicillin: R >8      -  Rifampin: S <=1 Should not be used as monotherapy      -  Tetracycline: S 2      -  Trimethoprim/Sulfamethoxazole: S <=0.5/9.5      -  Vancomycin: S 2        RADIOLOGY & ADDITIONAL TESTS: Personally reviewed.     Consultant(s) Notes Reviewed:  [x] YES  [ ] NO   Discussed with SW/PEPPER, RN     Patient is a 78M h/o ESRD, Afib on Eliquis, CAD s/p PCI, prosthetic s/p MVR, h/o proctitis, hemorrhoids, hypothyroidism was in ICU for metabolic encephalopathy, septic shock 2/2 MRSA pneumonia s/p tunnel cath removal 10/3, IJ Shiley placed 10/4, downgraded to 3E.      INTERVAL HPI/OVERNIGHT EVENTS: Patient seen and examined at bedside. No overnight events.    MEDICATIONS  (STANDING):  aspirin  chewable 81 milliGRAM(s) Oral daily  ceftaroline fosamil IVPB 200 milliGRAM(s) IV Intermittent every 8 hours  ceftaroline fosamil IVPB 200 milliGRAM(s) IV Intermittent once  ceftaroline fosamil IVPB      chlorhexidine 2% Cloths 1 Application(s) Topical <User Schedule>  epoetin val (PROCRIT) Injectable 01917 Unit(s) IV Push <User Schedule>  folic acid 1 milliGRAM(s) Oral daily  heparin   Injectable 5000 Unit(s) SubCutaneous every 12 hours  hydrocortisone hemorrhoidal Suppository 1 Suppository(s) Rectal two times a day  influenza  Vaccine (HIGH DOSE) 0.7 milliLiter(s) IntraMuscular once  levothyroxine 137 MICROGram(s) Oral daily  mesalamine Suppository 1000 milliGRAM(s) Rectal at bedtime  midodrine 10 milliGRAM(s) Oral every 8 hours  pantoprazole   Suspension 40 milliGRAM(s) Enteral Tube daily  polyethylene glycol 3350 17 Gram(s) Oral daily  senna 2 Tablet(s) Oral at bedtime  vancomycin  IVPB 750 milliGRAM(s) IV Intermittent <User Schedule>    MEDICATIONS  (PRN):  acetaminophen     Tablet .. 650 milliGRAM(s) Oral every 6 hours PRN Mild Pain (1 - 3)  aluminum hydroxide/magnesium hydroxide/simethicone Suspension 10 milliLiter(s) Oral every 6 hours PRN dyspepsia  sodium chloride 0.9% lock flush 10 milliLiter(s) IV Push every 1 hour PRN Pre/post blood products, medications, blood draw, and to maintain line patency      Allergies    levofloxacin (Unknown)  alfuzosin (Unknown)  tamsulosin (Unknown)  Myrbetriq (Unknown)    Intolerances      Vital Signs Last 24 Hrs  T(C): 36.5 (09 Oct 2023 11:40), Max: 37.7 (08 Oct 2023 21:19)  T(F): 97.7 (09 Oct 2023 11:40), Max: 99.9 (08 Oct 2023 21:19)  HR: 82 (09 Oct 2023 11:40) (74 - 105)  BP: 96/62 (09 Oct 2023 11:40) (96/62 - 130/61)  BP(mean): 70 (09 Oct 2023 11:40) (70 - 80)  RR: 18 (09 Oct 2023 11:40) (14 - 19)  SpO2: 96% (09 Oct 2023 11:40) (93% - 98%)    Parameters below as of 09 Oct 2023 11:40  Patient On (Oxygen Delivery Method): room air      I&O's Summary    08 Oct 2023 07:01  -  09 Oct 2023 07:00  --------------------------------------------------------  IN: 310 mL / OUT: 0 mL / NET: 310 mL          PHYSICAL EXAM:  GENERAL: NAD  HEENT:  AT/NC, moist mucous membranes  CHEST/LUNG:  CTA b/l, no rales, wheezes, or rhonchi,  normal respiratory effort, no intercostal retractions  HEART:  irregular HR, S1, S2; no pitting edema  ABDOMEN:  BS+, soft, nontender, nondistended  MSK/EXTREMITIES: 2+ peripheral pulses, b/l upper ext edema, nontender, edema rt foot  NERVOUS SYSTEM: awakes to name      LABS: Personally reviewed  CBC                        7.8    18.06 )-----------( 111      ( 09 Oct 2023 07:00 )             22.2     CMP  10-09    134  |  98  |  82  ----------------------------<  96  4.1   |  27  |  5.27    Ca    10.1      09 Oct 2023 07:00  Phos  3.2     10-09  Mg     1.9     10-09    TPro  6.0  /  Alb  2.1  /  TBili  0.8  /  DBili  x   /  AST  25  /  ALT  27  /  AlkPhos  243  10-09          PT/INR - ( 08 Oct 2023 06:30 )   PT: 12.1 sec;   INR: 1.06 ratio         PTT - ( 08 Oct 2023 06:30 )  PTT:30.8 sec            TSH 31.921   TSH with FT4 reflex --  Total T3 --                  Urinalysis Basic - ( 09 Oct 2023 07:00 )    Color: x / Appearance: x / SG: x / pH: x  Gluc: 96 mg/dL / Ketone: x  / Bili: x / Urobili: x   Blood: x / Protein: x / Nitrite: x   Leuk Esterase: x / RBC: x / WBC x   Sq Epi: x / Non Sq Epi: x / Bacteria: x        Culture - Blood (collected 06 Oct 2023 10:20)  Source: .Blood Blood-Peripheral  Gram Stain (07 Oct 2023 04:46):    Growth in aerobic bottle: Gram Positive Cocci in Clusters    Growth in anaerobic bottle: Gram Positive Cocci in Clusters  Final Report (08 Oct 2023 08:18):    Growth in aerobic and anaerobic bottles: Methicillin Resistant    Staphylococcus aureus    See previous culture [id number]    Culture - Blood (collected 04 Oct 2023 06:00)  Source: .Blood Blood  Gram Stain (05 Oct 2023 07:40):    Growth in aerobic bottle: Gram Positive Cocci in Clusters    Growth in anaerobic bottle: Gram Positive Cocci in Clusters  Final Report (06 Oct 2023 14:49):    Growth in aerobic and anaerobic bottles: Methicillin Resistant    Staphylococcus aureus    See previous culture [id number]    Culture - Blood (collected 04 Oct 2023 06:00)  Source: .Blood Blood  Gram Stain (05 Oct 2023 08:18):    Growth in aerobic bottle: Gram Positive Cocci in Clusters    Growth in anaerobic bottle: Gram Positive Cocci in Clusters  Final Report (06 Oct 2023 14:47):    Growth in aerobic and anaerobic bottles: Methicillin Resistant    Staphylococcus aureus  Organism: Methicillin resistant Staphylococcus aureus (06 Oct 2023 14:47)  Organism: Methicillin resistant Staphylococcus aureus (06 Oct 2023 14:47)      Method Type: LEWIS      -  Ampicillin/Sulbactam: R <=8/4      -  Cefazolin: R >16      -  Clindamycin: R >4      -  Daptomycin: S 1      -  Erythromycin: R >4      -  Gentamicin: S <=1 Should not be used as monotherapy      -  Linezolid: S 2      -  Oxacillin: R >2      -  Penicillin: R 8      -  Rifampin: S <=1 Should not be used as monotherapy      -  Tetracycline: S 2      -  Trimethoprim/Sulfamethoxazole: S <=0.5/9.5      -  Vancomycin: S 2    Culture - Catheter (collected 02 Oct 2023 16:21)  Source: .Catheter + MRSA Bacterimia Permacath Removal Aerobic/Anaerobic Right  IJ Permacath TIP  Final Report (05 Oct 2023 14:50):    Greater than or equal to 15 Colonies Methicillin Resistant Staphylococcus    aureus  Organism: Methicillin resistant Staphylococcus aureus (05 Oct 2023 14:50)  Organism: Methicillin resistant Staphylococcus aureus (05 Oct 2023 14:50)      Method Type: LEWIS      -  Ampicillin/Sulbactam: R 16/8      -  Cefazolin: R >16      -  Clindamycin: R >4      -  Daptomycin: S 1      -  Erythromycin: R >4      -  Gentamicin: S <=1 Should not be used as monotherapy      -  Linezolid: S 4      -  Oxacillin: R >2      -  Penicillin: R >8      -  Rifampin: S <=1 Should not be used as monotherapy      -  Tetracycline: S 2      -  Trimethoprim/Sulfamethoxazole: S <=0.5/9.5      -  Vancomycin: S 2            Culture - Blood (collected 10-06-23 @ 10:20)  Source: .Blood Blood-Peripheral  Gram Stain (10-07-23 @ 04:46):    Growth in aerobic bottle: Gram Positive Cocci in Clusters    Growth in anaerobic bottle: Gram Positive Cocci in Clusters  Final Report (10-08-23 @ 08:18):    Growth in aerobic and anaerobic bottles: Methicillin Resistant    Staphylococcus aureus    See previous culture [id number]    Culture - Blood (collected 10-04-23 @ 06:00)  Source: .Blood Blood  Gram Stain (10-05-23 @ 07:40):    Growth in aerobic bottle: Gram Positive Cocci in Clusters    Growth in anaerobic bottle: Gram Positive Cocci in Clusters  Final Report (10-06-23 @ 14:49):    Growth in aerobic and anaerobic bottles: Methicillin Resistant    Staphylococcus aureus    See previous culture [id number]    Culture - Blood (collected 10-04-23 @ 06:00)  Source: .Blood Blood  Gram Stain (10-05-23 @ 08:18):    Growth in aerobic bottle: Gram Positive Cocci in Clusters    Growth in anaerobic bottle: Gram Positive Cocci in Clusters  Final Report (10-06-23 @ 14:47):    Growth in aerobic and anaerobic bottles: Methicillin Resistant    Staphylococcus aureus  Organism: Methicillin resistant Staphylococcus aureus (10-06-23 @ 14:47)  Organism: Methicillin resistant Staphylococcus aureus (10-06-23 @ 14:47)      Method Type: LEWIS      -  Ampicillin/Sulbactam: R <=8/4      -  Cefazolin: R >16      -  Clindamycin: R >4      -  Daptomycin: S 1      -  Erythromycin: R >4      -  Gentamicin: S <=1 Should not be used as monotherapy      -  Linezolid: S 2      -  Oxacillin: R >2      -  Penicillin: R 8      -  Rifampin: S <=1 Should not be used as monotherapy      -  Tetracycline: S 2      -  Trimethoprim/Sulfamethoxazole: S <=0.5/9.5      -  Vancomycin: S 2    Culture - Catheter (collected 10-02-23 @ 16:21)  Source: .Catheter + MRSA Bacterimia Permacath Removal Aerobic/Anaerobic Right  IJ Permacath TIP  Final Report (10-05-23 @ 14:50):    Greater than or equal to 15 Colonies Methicillin Resistant Staphylococcus    aureus  Organism: Methicillin resistant Staphylococcus aureus (10-05-23 @ 14:50)  Organism: Methicillin resistant Staphylococcus aureus (10-05-23 @ 14:50)      Method Type: LEWIS      -  Ampicillin/Sulbactam: R 16/8      -  Cefazolin: R >16      -  Clindamycin: R >4      -  Daptomycin: S 1      -  Erythromycin: R >4      -  Gentamicin: S <=1 Should not be used as monotherapy      -  Linezolid: S 4      -  Oxacillin: R >2      -  Penicillin: R >8      -  Rifampin: S <=1 Should not be used as monotherapy      -  Tetracycline: S 2      -  Trimethoprim/Sulfamethoxazole: S <=0.5/9.5      -  Vancomycin: S 2        RADIOLOGY & ADDITIONAL TESTS: Personally reviewed.     Consultant(s) Notes Reviewed:  [x] YES  [ ] NO   Discussed with SW/PEPPER, RN

## 2023-10-09 NOTE — PROGRESS NOTE ADULT - ASSESSMENT
Patient is a 78M h/o ESRD, Afib on Eliquis, CAD s/p PCI, prosthetic s/p MVR, h/o proctitis, hemorrhoids, hypothyroidism was in ICU for metabolic encephalopathy, septic shock 2/2 MRSA pneumonia s/p tunnel cath removal 10/3, IJ Shiley placed 10/4, downgraded to 3E for further management.    #Metabolic encephalopathy  #Septic shock  - 2/2 MRSA bacteremia  - shock resolved  - plan to wean off midodrine as tolerated  - PT consult  - maintain O2 sat >94%  - speech and swallow eval: puree with thin liquids    #MRSA bacteremia  - permacath removed 10/3, shiley placed 10/4  - TTE 10/1/23 EF 45-50%, no vegetations  - increased ceftaroline from q12 to q8 per ID  - c/w vancomycin  - BCx 10/6 +MRSA  - F/U repeat BCx 10/9    #ESRD  - HD M/W/F  - epogen on HD days  - HD today  - Shiley placed in ICU 10/4  - Nephro consult appreciated  - daily weights    #Afib  - rate controlled  - h/o CAD s/p PCI, s/p MVR  - AC previously held for thrombocytopenia which is now resolved  - c/w ASA  - resume eliquis tonight    #Hypothyroidism  - c/w synthroid  - TSH 31.921    #h/o hemorrhoids, proctitis  - c/w hydrocortisone suppository, mesalamine  - c/w bowel regimen    #DVT ppx  - c/w sc heparin    #GI ppx  - c/w pantoprazole 40mg qd    Full code. Patient is a 78M h/o ESRD, Afib on Eliquis, CAD s/p PCI, prosthetic s/p MVR, h/o proctitis, hemorrhoids, hypothyroidism was in ICU for metabolic encephalopathy, septic shock 2/2 MRSA pneumonia s/p tunnel cath removal 10/3, IJ Shiley placed 10/4, downgraded to 3E for further management.    #Septic shock 2/2 MRSA bacteremia  #Metabolic encephalopathy  - sepsis picture improved  - awake, AOx1  - plan to wean off midodrine as tolerated  - PT consult  - maintain O2 sat >94%  - speech and swallow eval: puree with thin liquids    #MRSA bacteremia  - permacath removed 10/3, shiley placed 10/4  - TTE 10/1/23 EF 45-50%, no vegetations  - increased ceftaroline from q12 to q8 per ID  - c/w vancomycin post-dialysis  - BCx 10/6 +MRSA  - F/U repeat BCx 10/9  - concern for endocarditis, but not a candidate for valve replacements    #ESRD  - HD M/W/F  - epogen and vancomycin on HD days  - HD today  - Shiley placed in ICU 10/4  - Nephro consult appreciated  - daily weights    #Afib  - rate controlled  - h/o CAD s/p PCI, s/p MVR  - AC previously held for thrombocytopenia which is now resolved  - c/w ASA  - resume eliquis tonight    #Hypothyroidism  - uncontrolled on synthroid 137mcg  - TSH 31.921  - increase synthroid to 150mcg  - F/U outpatient to titrate as needed    #h/o hemorrhoids, proctitis  - c/w hydrocortisone suppository, mesalamine  - c/w bowel regimen    #DVT ppx  - c/w sc heparin    #GI ppx  - c/w pantoprazole 40mg qd    Full code. Patient is a 78M h/o ESRD, Afib on Eliquis, CAD s/p PCI, prosthetic s/p MVR, h/o proctitis, hemorrhoids, hypothyroidism was in ICU for metabolic encephalopathy, septic shock 2/2 MRSA pneumonia s/p tunnel cath removal 10/3, IJ Shiley placed 10/4, downgraded to 3E for further management.    #Septic shock 2/2 MRSA bacteremia  #Metabolic encephalopathy  - sepsis picture improved  - awake, AOx1  - plan to wean off midodrine as tolerated  - PT consult  - maintain O2 sat >94%  - speech and swallow eval: puree with thin liquids    #MRSA bacteremia  - permacath removed 10/3, shiley placed 10/4  - TTE 10/1/23 EF 45-50%, no vegetations  - increased ceftaroline from q12 to q8 per ID  - c/w vancomycin post-dialysis  - BCx 10/6 +MRSA  - F/U repeat BCx 10/9  - concern for endocarditis, but not a candidate for valve replacements    #ESRD  - HD M/W/F  - epogen and vancomycin on HD days  - HD today  - Shiley placed in ICU 10/4  - Nephro consult appreciated  - daily weights    #Afib  - rate controlled  - h/o CAD s/p PCI, s/p MVR  - AC previously held for thrombocytopenia which is now resolved  - resume eliquis tonight  - c/w ASA    #Hypothyroidism  - uncontrolled on synthroid 137mcg  - TSH 31.921  - increase synthroid to 150mcg  - F/U outpatient to titrate as needed    #h/o hemorrhoids, proctitis  - c/w hydrocortisone suppository, mesalamine  - c/w bowel regimen    #DVT ppx  - resume eliquis tonight    #GI ppx  - c/w pantoprazole 40mg qd    Full code. Palliative on board.

## 2023-10-09 NOTE — PROGRESS NOTE ADULT - PROBLEM SELECTOR PLAN 1
Thrombocytopenia–secondary to sepsis; ESRD–associated anemia.  Platelet count slightly improved at 111,000.  Continue treatment with antibiotic per ID.  Hematologic profile expected to improve with better control of infection.  Continue ISREAL per nephrology.  No need for transfusions at this time.

## 2023-10-09 NOTE — PROGRESS NOTE ADULT - ASSESSMENT
Physical Examination:  GENERAL:               Awake, No acute distress.    HEENT:                   No JVD, Moist MM, RIJ HD catheter  PULM:                     Bilateral air entry, No Rhonchi, No Wheezing  CVS:                         S1, S2,  + Murmur  ABD:                        Soft, nondistended, nontender, normoactive bowel sounds,   EXT:                         + bilateral upper extremity edema, nontender, No Cyanosis or Clubbing   Vascular:                 Warm Extremities, Normal Capillary refill, Normal Distal Pulses  NEURO:                  Awake, Arousable, follows simple commands  PSYC:                      Calm, Limited Insight.        Assessment  Septic shock, suspect line sepsis  MRSA blood stream infection  AMS- metabolic encephelopathy  Thrombocytopenia, suspect from Sepsis  Underlying h/o CAD s/p PCI, BPH, ESRD on HD, s/p MVR, Afib    Plan:  Slight mental status improving, suspect metabolic encephelopathy   Repeat blood cultures remains positive from 10/6  Cont. midodrine for now, taper as tolerated but patient on it chronically pre HD  antibiotics as per ID,   Perm cath removed 10/3/2023   Thrombocytopenia improving  ID, Renal, Heme f/u  On going discussion with the NOK, concern for possibly infective endocarditis given patient not clearing bacteremia.  may need ERENDIRA    PMD:				                   Notified(Date):  Family Updated: 	Surekha BLISS  [phone], C  [phone](preferred line)                 Date: 10/7/2023      Sedation & Analgesia:	n/a  Diet/Nutrition:		Diet, Renal Restrictions: For patients receiving Renal Replacement - No Protein Restr, No Conc K, No Conc Phos, Low Sodium (09-30-23 @ 23:51) [Active]  GI PPx:			Protonix  DVT Ppx:		Restart heparin sq    	       Activity:		    Head of Bed:               35-45 Deg  Glycemic Control:        n/a    Lines:  PIV  CENTRAL LINE: 	[X] YES [ ] NO RIJ Shiley // Perm cath present on admission removed in or 10/2/2023            LOCATION:   	                       DATE INSERTED:   	                    REMOVE:  [ ] YES [X ] NO    A-LINE:  	                [ ] YES [ ] NO                      LOCATION:   	                       DATE INSERTED: 		            REMOVE:  [ ] YES [ ] NO    SU: 		        [ ] YES [ ] NO  		                                       DATE INSERTED:		            REMOVE:  [ ] YES [ ] NO      Restraints may deemed necessary to prevent removal of life-sustaining devices [ ] YES   [  x  ]  NO    Disposition:  Continue are on medical floor, case d/w Dr. Almaguer  Goals of Care: MOLST from 8/30/23 Full code Physical Examination:  GENERAL:               Awake, No acute distress.    HEENT:                   No JVD, Moist MM, RIJ HD catheter  PULM:                     Bilateral air entry, No Rhonchi, No Wheezing  CVS:                         S1, S2,  + Murmur  ABD:                        Soft, nondistended, nontender, normoactive bowel sounds,   EXT:                         + bilateral upper extremity edema, nontender, No Cyanosis or Clubbing   Vascular:                 Warm Extremities, Normal Capillary refill, Normal Distal Pulses  NEURO:                  Awake, Arousable, follows simple commands  PSYC:                      Calm, Limited Insight.        Assessment  Septic shock, suspect line sepsis  MRSA blood stream infection  AMS- metabolic encephelopathy  Thrombocytopenia, suspect from Sepsis  Underlying h/o CAD s/p PCI, BPH, ESRD on HD, s/p MVR, Afib    Plan:  Slight mental status improving, suspect metabolic encephelopathy   Repeat blood cultures remains positive from 10/6  Cont. midodrine for now, taper as tolerated but patient on it chronically pre HD  antibiotics as per ID,   Perm cath removed 10/3/2023   Thrombocytopenia improving  ID, Renal, Heme f/u  On going discussion with the NOK, concern for possibly infective endocarditis given patient not clearing bacteremia.  may need ERENDIRA    PMD:				                   Notified(Date):  Family Updated: 	Surekha BLISS  [phone], C  [phone](preferred line)                 Date: 10/7/2023      Sedation & Analgesia:	n/a  Diet/Nutrition:		Diet, Renal Restrictions: For patients receiving Renal Replacement - No Protein Restr, No Conc K, No Conc Phos, Low Sodium (09-30-23 @ 23:51) [Active]  GI PPx:			Protonix  DVT Ppx:		Restart heparin sq    	       Activity:		    Head of Bed:               35-45 Deg  Glycemic Control:        n/a    Lines:  PIV  CENTRAL LINE: 	[X] YES [ ] NO RIJ Shiley // Perm cath present on admission removed in or 10/2/2023            LOCATION:   	                       DATE INSERTED:   	                    REMOVE:  [ ] YES [X ] NO    A-LINE:  	                [ ] YES [ ] NO                      LOCATION:   	                       DATE INSERTED: 		            REMOVE:  [ ] YES [ ] NO    SU: 		        [ ] YES [ ] NO  		                                       DATE INSERTED:		            REMOVE:  [ ] YES [ ] NO      Restraints may deemed necessary to prevent removal of life-sustaining devices [ ] YES   [  x  ]  NO    Disposition:  Continue are on medical floor, case d/w Dr. Alamguer  Goals of Care: MOLST from 8/30/23 Full code Physical Examination:  GENERAL:               Awake, No acute distress.    HEENT:                   No JVD, Moist MM, RIJ HD catheter  PULM:                     Bilateral air entry, No Rhonchi, No Wheezing  CVS:                         S1, S2,  + Murmur  ABD:                        Soft, nondistended, nontender, normoactive bowel sounds,   EXT:                         + bilateral upper extremity edema, nontender, No Cyanosis or Clubbing   Vascular:                 Warm Extremities, Normal Capillary refill, Normal Distal Pulses  NEURO:                  Awake, Arousable, follows simple commands  PSYC:                      Calm, Limited Insight.        Assessment  Septic shock, suspect line sepsis  MRSA blood stream infection  AMS- metabolic encephelopathy  Thrombocytopenia, suspect from Sepsis  Underlying h/o CAD s/p PCI, BPH, ESRD on HD, s/p MVR, Afib    Plan:  Slight mental status improving, suspect metabolic encephelopathy   Repeat blood cultures remains positive from 10/6  Cont. midodrine for now, taper as tolerated but patient on it chronically pre HD  antibiotics as per ID,   Perm cath removed 10/3/2023   Thrombocytopenia improving  ID, Renal, Heme f/u  On going discussion with the NOK, concern for possibly infective endocarditis given patient not clearing bacteremia.  may need ERENDIRA    PMD:				                   Notified(Date):  Family Updated: 	Surekha BLISS  [phone], C  [phone](preferred line)                 Date: 10/7/2023      Sedation & Analgesia:	n/a  Diet/Nutrition:		Diet, Renal Restrictions: For patients receiving Renal Replacement - No Protein Restr, No Conc K, No Conc Phos, Low Sodium (09-30-23 @ 23:51) [Active]  GI PPx:			Protonix  DVT Ppx:		Restart heparin sq    	       Activity:		    Head of Bed:               35-45 Deg  Glycemic Control:        n/a    Lines:  PIV  CENTRAL LINE: 	[X] YES [ ] NO RIJ Shiley // Perm cath present on admission removed in or 10/2/2023            LOCATION:   	                       DATE INSERTED:   	                    REMOVE:  [ ] YES [X ] NO    A-LINE:  	                [ ] YES [ ] NO                      LOCATION:   	                       DATE INSERTED: 		            REMOVE:  [ ] YES [ ] NO    SU: 		        [ ] YES [ ] NO  		                                       DATE INSERTED:		            REMOVE:  [ ] YES [ ] NO      Restraints may deemed necessary to prevent removal of life-sustaining devices [ ] YES   [  x  ]  NO    Disposition:  Continue are on medical floor, case d/w Dr. Almaguer  Goals of Care: MOLST from 8/30/23 Full code

## 2023-10-09 NOTE — PROGRESS NOTE ADULT - ASSESSMENT
A/P 79 yo M pmhx ESRD came to ED complaining of fatigue from GG [facility] . Patient  also febrile to 102.2 in ED with complains of cough and wheezing.  Noted to be hypotensive to 80's systolic.         Assessment/Plans:         Sepsis:   -9/30 Bcx +MRSA,10/2  tunneled catheter tip +MRSA- pt on Vanco   - ID following :       given bacteremia and  bioprosthetic  valve, concern for endocarditis, however pt unable to tolerate ERENDIRA at this time 2/2 encephalopathy, thrombocytopenia       ID reviewed pt with cardiology, ERENDIRA unlikely to  as he is not an operative candidate will consider combination therapy. w/ ceftaroline to try to control bacteremia  - TTE 10/1/23 EF 45-50%, no vegetation    ESRD   -  on HD via Rt shiley HD cath- infected tunneled HD catheter removed  - Nephrology following       Lethargy /encephalopathy   - continues, but may improve w/ antbx and HD  - repeat CT head was neg.   - passed speech swallow eval  for pureed w/ thins today         Palliative :     as a f/u today , chart reviewed, pt seen bedside. Pt now out of ICU Pt s/p HD this AM, remains very drowsy, lethargic. Wakes briefly to name or stim. unable to keep eyes open for long periods. He mumbles few words .  Does not appear to be in any distress, or sob, just lethargic. I  called and spoke w/ girlfriend and his HCP Surekha, see [facility] note above.   Surekha aware of pt condition and plan for second antibiotic, she remains hopeful for more recovery.  Says pts baseline  is  A&O x 3 and fully converses.  Surekha maintaining  full code status for now, wishes to see if he can improve any further.  Cont to follow clinical course.     A/P 79 yo M pmhx ESRD came to ED complaining of fatigue from GG JOEL . Patient  also febrile to 102.2 in ED with complains of cough and wheezing.  Noted to be hypotensive to 80's systolic.         Assessment/Plans:         Sepsis:   -9/30 Bcx +MRSA,10/2  tunneled catheter tip +MRSA- pt on Vanco   - ID following :       given bacteremia and  bioprosthetic  valve, concern for endocarditis, however pt unable to tolerate ERENDIRA at this time 2/2 encephalopathy, thrombocytopenia       ID reviewed pt with cardiology, ERENDIRA unlikely to  as he is not an operative candidate will consider combination therapy. w/ ceftaroline to try to control bacteremia  - TTE 10/1/23 EF 45-50%, no vegetation    ESRD   -  on HD via Rt shiley HD cath- infected tunneled HD catheter removed  - Nephrology following       Lethargy /encephalopathy   - continues, but may improve w/ antbx and HD  - repeat CT head was neg.   - passed speech swallow eval  for pureed w/ thins today         Palliative :     as a f/u today , chart reviewed, pt seen bedside. Pt now out of ICU Pt s/p HD this AM, remains very drowsy, lethargic. Wakes briefly to name or stim. unable to keep eyes open for long periods. He mumbles few words .  Does not appear to be in any distress, or sob, just lethargic. I  called and spoke w/ girlfriend and his HCP Surekha, see [facility] note above.   Surekha aware of pt condition and plan for second antibiotic, she remains hopeful for more recovery.  Says pts baseline  is  A&O x 3 and fully converses.  Surekha maintaining  full code status for now, wishes to see if he can improve any further.  Cont to follow clinical course.     A/P 77 yo M pmhx ESRD came to ED complaining of fatigue from GG [facility] . Patient  also febrile to 102.2 in ED with complains of cough and wheezing.  Noted to be hypotensive to 80's systolic.         Assessment/Plans:         Sepsis:   -9/30 Bcx +MRSA,10/2  tunneled catheter tip +MRSA- pt on Vanco   - ID following :       given bacteremia and  bioprosthetic  valve, concern for endocarditis, however pt unable to tolerate ERENDIRA at this time 2/2 encephalopathy, thrombocytopenia       ID reviewed pt with cardiology, ERENDIRA unlikely to  as he is not an operative candidate will consider combination therapy. w/ ceftaroline to try to control bacteremia  - TTE 10/1/23 EF 45-50%, no vegetation    ESRD   -  on HD via Rt shiley HD cath- infected tunneled HD catheter removed  - Nephrology following       Lethargy /encephalopathy   - continues, but may improve w/ antbx and HD  - repeat CT head was neg.   - passed speech swallow eval  for pureed w/ thins today         Palliative :     as a f/u today , chart reviewed, pt seen bedside. Pt now out of ICU Pt s/p HD this AM, remains very drowsy, lethargic. Wakes briefly to name or stim. unable to keep eyes open for long periods. He mumbles few words .  Does not appear to be in any distress, or sob, just lethargic. I  called and spoke w/ girlfriend and his HCP Surekha, see [facility] note above.   Surekha aware of pt condition and plan for second antibiotic, she remains hopeful for more recovery.  Says pts baseline  is  A&O x 3 and fully converses.  Surekha maintaining  full code status for now, wishes to see if he can improve any further.  Cont to follow clinical course.     Patient expressed no known problems or needs

## 2023-10-09 NOTE — PROGRESS NOTE ADULT - CONVERSATION DETAILS
Spoke w/ surekha today , she is unable to be here today . I explained role of palliative and reviewed pts current condition. Surekha was in to see pt this past weekend, and she is aware that his lethargy is continuing and aware that his infection is not clearing. We discussed  that  a second antibiotic  has been added . I asked Surekha pts baseline MSSurekha reports pt was fully alert and oriented up until this present infection started . Says they have been together for almost 18 years . Explained pt started having health issues in jan /feb., was having "fluid overload" and was hospitalized , also had an infection at that time, Surekha says he was supposed to continue his lasix after the hospital but he did not and then his kidneys failed sometime in March, that is when he started HD.  was in [facility] at  getting PT and HD , and was planning to go back home, then this infection began.  Surekha remains hopeful for more recovery from this infection , she does not want to "give up" yet . I asked about if they ever had any discussions regarding interventions like cpr and intubation if they became  sick. Surekha said she knows he would not want to be " kept alive on a machine"   I explained that during cpr there is a very high chance he would be intubated, and we would try to get him off the ventilator if able.  At this time, Surekha wishes to wait to see if he can still improve , and wants to keep his full code status in place for now.  She does have my contact info for any questions.

## 2023-10-09 NOTE — PROGRESS NOTE ADULT - NUTRITIONAL ASSESSMENT
This patient has been assessed with a concern for Malnutrition and has been determined to have a diagnosis/diagnoses of Moderate protein-calorie malnutrition.    This patient is being managed with:   Diet Pureed-  DASH/TLC {Sodium & Cholesterol Restricted}  For patients receiving Renal Replacement - No Protein Restr No Conc K No Conc Phos Low Sodium  Entered: Oct  8 2023  1:44PM

## 2023-10-09 NOTE — PROGRESS NOTE ADULT - SUBJECTIVE AND OBJECTIVE BOX
NEPHROLOGY PROGRESS NOTE    CHIEF COMPLAINT:  ESRD    HPI:  Had dialysis earlier today cut short after 2 hrs due to intradialytic hypotension to SBP 70's.      EXAM:  Vital Signs Last 24 Hrs  T(C): 36.6 (09 Oct 2023 12:30), Max: 37.7 (08 Oct 2023 21:19)  T(F): 97.9 (09 Oct 2023 12:30), Max: 99.9 (08 Oct 2023 21:19)  HR: 93 (09 Oct 2023 13:07) (74 - 122)  BP: 89/52 (09 Oct 2023 13:07) (89/52 - 127/66)  BP(mean): 64 (09 Oct 2023 13:07) (64 - 80)  RR: 18 (09 Oct 2023 12:30) (18 - 19)  SpO2: 92% (09 Oct 2023 12:30) (92% - 98%)    Parameters below as of 09 Oct 2023 12:30  Patient On (Oxygen Delivery Method): room air      I&O's Summary    08 Oct 2023 07:01  -  09 Oct 2023 07:00  --------------------------------------------------------  IN: 310 mL / OUT: 0 mL / NET: 310 mL      Daily     Daily Weight in k.7 (09 Oct 2023 11:40)      Poorly responsive  Normal respiratory effort, lungs clear bilaterally  Heart RRR with no murmur, +peripheral edema    LABS                        7.8    18.06 )-----------( 111      ( 09 Oct 2023 07:00 )             22.2     1009    134<L>  |  98  |  82<H>  ----------------------------<  96  4.1   |  27  |  5.27<H>    Ca    10.1      09 Oct 2023 07:00  Phos  3.2     10-09  Mg     1.9     10-09    TPro  6.0  /  Alb  2.1<L>  /  TBili  0.8  /  DBili  x   /  AST  25  /  ALT  27  /  AlkPhos  243<H>  10-09      Impression/Plan  ESRD on HD at [facility] MWF via perm cath, Afib, CM, EF 45 - 50%  Has LUE AVF not yet mature   Adm 23 w/MRSA bacteremia, persistent as of 10/6 blood culture  Perm cath d/c-d 10/2/23 and temp line placed 10/4  He had abbreviated hemodialysis session today due to severe intradialytic hypotension  Continue midodrine, antibiotics as per ID  Prognosis poor         NEPHROLOGY PROGRESS NOTE    CHIEF COMPLAINT:  ESRD    HPI:  Had dialysis earlier today cut short after 2 hrs due to intradialytic hypotension to SBP 70's.      EXAM:  Vital Signs Last 24 Hrs  T(C): 36.6 (09 Oct 2023 12:30), Max: 37.7 (08 Oct 2023 21:19)  T(F): 97.9 (09 Oct 2023 12:30), Max: 99.9 (08 Oct 2023 21:19)  HR: 93 (09 Oct 2023 13:07) (74 - 122)  BP: 89/52 (09 Oct 2023 13:07) (89/52 - 127/66)  BP(mean): 64 (09 Oct 2023 13:07) (64 - 80)  RR: 18 (09 Oct 2023 12:30) (18 - 19)  SpO2: 92% (09 Oct 2023 12:30) (92% - 98%)    Parameters below as of 09 Oct 2023 12:30  Patient On (Oxygen Delivery Method): room air      I&O's Summary    08 Oct 2023 07:01  -  09 Oct 2023 07:00  --------------------------------------------------------  IN: 310 mL / OUT: 0 mL / NET: 310 mL      Daily     Daily Weight in k.7 (09 Oct 2023 11:40)      Poorly responsive  Normal respiratory effort, lungs clear bilaterally  Heart RRR with no murmur, +peripheral edema    LABS                        7.8    18.06 )-----------( 111      ( 09 Oct 2023 07:00 )             22.2     1009    134<L>  |  98  |  82<H>  ----------------------------<  96  4.1   |  27  |  5.27<H>    Ca    10.1      09 Oct 2023 07:00  Phos  3.2     10-09  Mg     1.9     10-09    TPro  6.0  /  Alb  2.1<L>  /  TBili  0.8  /  DBili  x   /  AST  25  /  ALT  27  /  AlkPhos  243<H>  10-09      Impression/Plan  ESRD on HD at [facility] MWF via perm cath, Afib, CM, EF 45 - 50%  Has LUE AVF not yet mature   Adm 23 w/MRSA bacteremia, persistent as of 10/6 blood culture  Perm cath d/c-d 10/2/23 and temp line placed 10/4  He had abbreviated hemodialysis session today due to severe intradialytic hypotension  Continue midodrine, antibiotics as per ID  Prognosis poor         NEPHROLOGY PROGRESS NOTE    CHIEF COMPLAINT:  ESRD    HPI:  Had dialysis earlier today cut short after 2 hrs due to intradialytic hypotension to SBP 70's.      EXAM:  Vital Signs Last 24 Hrs  T(C): 36.6 (09 Oct 2023 12:30), Max: 37.7 (08 Oct 2023 21:19)  T(F): 97.9 (09 Oct 2023 12:30), Max: 99.9 (08 Oct 2023 21:19)  HR: 93 (09 Oct 2023 13:07) (74 - 122)  BP: 89/52 (09 Oct 2023 13:07) (89/52 - 127/66)  BP(mean): 64 (09 Oct 2023 13:07) (64 - 80)  RR: 18 (09 Oct 2023 12:30) (18 - 19)  SpO2: 92% (09 Oct 2023 12:30) (92% - 98%)    Parameters below as of 09 Oct 2023 12:30  Patient On (Oxygen Delivery Method): room air      I&O's Summary    08 Oct 2023 07:01  -  09 Oct 2023 07:00  --------------------------------------------------------  IN: 310 mL / OUT: 0 mL / NET: 310 mL      Daily     Daily Weight in k.7 (09 Oct 2023 11:40)      Poorly responsive  Normal respiratory effort, lungs clear bilaterally  Heart RRR with no murmur, +peripheral edema    LABS                        7.8    18.06 )-----------( 111      ( 09 Oct 2023 07:00 )             22.2     1009    134<L>  |  98  |  82<H>  ----------------------------<  96  4.1   |  27  |  5.27<H>    Ca    10.1      09 Oct 2023 07:00  Phos  3.2     10-09  Mg     1.9     10-09    TPro  6.0  /  Alb  2.1<L>  /  TBili  0.8  /  DBili  x   /  AST  25  /  ALT  27  /  AlkPhos  243<H>  10-09      Impression/Plan  ESRD on HD at [facility] MWF via perm cath, Afib, CM, EF 45 - 50%  Has LUE AVF not yet mature   Adm 23 w/MRSA bacteremia, persistent as of 10/6 blood culture  Perm cath d/c-d 10/2/23 and temp line placed 10/4  He had abbreviated hemodialysis session today due to severe intradialytic hypotension  Continue midodrine, antibiotics as per ID  Prognosis poor

## 2023-10-09 NOTE — PROGRESS NOTE ADULT - ATTENDING COMMENTS
78 year old male with past medical history of A.fib, CAD, PPM, Mitral Valve Replacement, ESRD on HD via Permacath, history of MSSA bacteremia 3/2023 s/p PPM explant and micra PPM placement, recent proctitis 8/2023 and clostridium bacteremia w/ ESBL in urine, who presented on 9/30 with fever/hypotension, AMS, admitted to MICU with septic shock secondary to MRSA bacteremia. He is s/p permactah removal on 10/2 with persistent bacteremia. He is followed by ID and cardiology. TTE 10/1 showed EF 45-50%, no evidence of endocarditis. As per ID and cardiology, ERENDIRA unlikely to  as he is not an operative candidate.  He was seen by heme for thrombocytopenia felt to be due to sepsis. Platelet counts have improved.  Patient with high grade MRSA bacteremia with most recent blood culture 10/6 still growing MRSA.  ID following, ceftaroline dose will be increased.    On exam he is very frail and chronically ill appearing. He is awake/alert and oriented to self only. Irregular heart rhythm, nl S1/S2, lung exam with diminished breath sounds at bases. Right permacath removal site with dressing in place, c/d/i. Right shiley in place. Abdomen exam benign.  No LE edema. Anasarca noted.    Labs notable for leukocytosis, Hb 7.8 and platelte 111.   TSH elevated.    Sepsis Shock 2/2 MRSA Bacteremia  -s/p permacath removal 10/2 with culture growing MRSA  -persistent high grade MRSA bacteremia despite permacath removal concerning for endocarditis in this patient with history of mitral valve replacement  -TTE showed no evidence of vegetation, ERENDIRA unlikely to  as he is not an operative candidate per cardiology  -Continue vancomycin + ceftaroline  -Vanco level per ID  -Blood culture 10/6 with MRSA, repeat cultures 10/9 sent   -Monitor WBC and hemodynamics   -ON midodrine 10mg Q8H, taper as tolerated   -Overall prognosis is guarded  -Palliative care following for goals of care    Anemia/Thrombocytopenia  -Heme consult appreciated, anemia secondary to ESRD/chronic disease, thrombocytopenia secondary to sepsis  -H/H slowly downtrending, likely iatrogenic + anemia of chronic disease, no evidence of overt bleeding, check stool occult   -Epo per nephrology  -Thrombocytopenia improving, >100k x 4 days   -Continue to monitor CBC    ESRD on HD   -M/W/F  -s/p permacath removal, being dialyzed via temporary access  -Nephrology following     CAD s/p PCI/A.fib  -Continue aspirin  -Eliquis on hold due to thrombocytopenia which is improving, if FOBT negative will restart     Dysphagia/Severe Protein-Calorie Malnutrition  -s/p NGT   -Started on dysphagia diet 10/8  -SLP following  -Aspiration precaution     AMS Likely Multifactorial 2/2 Sepsis/Metabolic Encephalopathy  -CTH 10/4 negative for acute intracranial pathology  -Monitor mental status    Hypothyroidism  -TSH elevated  -Increase synthroid to 150mcg QD  -Repeat TSH in 4 weeks     Constipation  -on bowel regimen  -Monitor BM's    GERD  -on PPI     DVT PPx  -Heparin SC    Overall prognosis guarded

## 2023-10-09 NOTE — PROGRESS NOTE ADULT - SUBJECTIVE AND OBJECTIVE BOX
CC: f/u for  mrsa bacteremia  Patient reports  nothing, not bverbal  REVIEW OF SYSTEMS:  All other review of systems negative (Comprehensive ROS)cannot get    Antimicrobials Day #  :4 synergistic tx  ceftaroline fosamil IVPB 200 milliGRAM(s) IV Intermittent every 8 hours  ceftaroline fosamil IVPB 200 milliGRAM(s) IV Intermittent once  ceftaroline fosamil IVPB      vancomycin  IVPB 750 milliGRAM(s) IV Intermittent <User Schedule>    Other Medications Reviewed    T(F): 97.9 (10-09-23 @ 12:30), Max: 99.9 (10-08-23 @ 21:19)  HR: 93 (10-09-23 @ 13:07)  BP: 89/52 (10-09-23 @ 13:07)  RR: 18 (10-09-23 @ 12:30)  SpO2: 92% (10-09-23 @ 12:30)  Wt(kg): --    PHYSICAL EXAM:  General: on hd, poorly responsive  no acute distress  Eyes:  anicteric, no conjunctival injection, no discharge  Oropharynx: no lesions or injection 	  Neck: supple, without adenopathy, shiley   Lungs: clear to auscultation  Heart: regular rate and rhythm; no murmur, rubs or gallops  Abdomen: soft, nondistended, nontender, without mass or organomegaly  Skin: no lesions  Extremities: no clubbing, cyanosis, or edema  Neurologic: poorly responsive     LAB RESULTS:                        7.8    18.06 )-----------( 111      ( 09 Oct 2023 07:00 )             22.2     10-    134<L>  |  98  |  82<H>  ----------------------------<  96  4.1   |  27  |  5.27<H>    Ca    10.1      09 Oct 2023 07:00  Phos  3.2     10-  Mg     1.9     10-    TPro  6.0  /  Alb  2.1<L>  /  TBili  0.8  /  DBili  x   /  AST  25  /  ALT  27  /  AlkPhos  243<H>  10    LIVER FUNCTIONS - ( 09 Oct 2023 07:00 )  Alb: 2.1 g/dL / Pro: 6.0 g/dL / ALK PHOS: 243 U/L / ALT: 27 U/L / AST: 25 U/L / GGT: x           Urinalysis Basic - ( 09 Oct 2023 07:00 )    Color: x / Appearance: x / SG: x / pH: x  Gluc: 96 mg/dL / Ketone: x  / Bili: x / Urobili: x   Blood: x / Protein: x / Nitrite: x   Leuk Esterase: x / RBC: x / WBC x   Sq Epi: x / Non Sq Epi: x / Bacteria: x      MICROBIOLOGY:  RECENT CULTURES:  10-06 @ 10:20 .Blood Blood-Peripheral     Growth in aerobic and anaerobic bottles: Methicillin Resistant  Staphylococcus aureus  See previous culture [id number]    Growth in aerobic bottle: Gram Positive Cocci in Clusters  Growth in anaerobic bottle: Gram Positive Cocci in Clusters        RADIOLOGY REVIEWED:  < from: Xray Chest 1 View- PORTABLE-Routine (Xray Chest 1 View- PORTABLE-Routine in AM.) (10.06.23 @ 08:23) >    ACC: [id number] EXAM:  XR CHEST PORTABLE ROUTINE 1V   ORDERED BY: JULIA CRUZ     PROCEDURE DATE:  10/06/2023          INTERPRETATION:  CLINICAL INDICATION: 78 years  Male with eval pna.    COMPARISON: 10/5/2023    Right central line tip overlies the superior vena cava. Enteric catheter   tip is below the diaphragm.    AP view of the chest demonstrates persistent moderate right pleural   effusion and no left pleural effusion. The pulmonary vasculature is   normal.    The heart is enlarged. Sternotomy and cardiac valve prosthesis. A   leadless pacemaker and left atrial appendage clips are reidentified.    The mediastinum and robert cannot be assessed due to rotation.    Mild thoracic degenerative changes are present.    IMPRESSION:    No significant change.    Moderate right pleural effusion. Cannot exclude underlying infiltrate or   atelectasis.    Cardiomegaly.    Right central line and enteric catheter in satisfactory position.    --- End of Report ---            < end of copied text >      < from: TTE Echo Complete w/o Contrast w/ Doppler (10.01.23 @ 10:26) >    ACC: [id number] EXAM:  ECHO TTE WO CON COMP W DOPP                          PROCEDURE DATE:  10/01/2023          INTERPRETATION:  TRANSTHORACIC ECHOCARDIOGRAM REPORT        Patient Name:   JANEE ARCEO Patient Location: [address] Rec #:  [id number] Accession #:      [id number]  Account #:      [id number]   Height:           67.7 in 172.0 cm  YOB: 1945  Weight:           158.7 lb 72.00 kg  Patient Age:    78 years   BSA:              1.85 m²  Patient Gender: M          BP:    97/42 mmHg      Date of Exam:        10/1/2023 10:26:05 AM  Sonographer:         RON  Referring Physician: NANCY    Procedure:     2D Echo/Doppler/Color Doppler Complete.  Indications:   I33.0 - Acute and subacute infective endocarditis  Diagnosis:     I33.0 - Acute and subacute infective endocarditis  Study Details: Technically adequate study. Study quality was adversely   affected                 due to Apical view is limited.        2D AND M-MODE MEASUREMENTS (normal ranges within parentheses):  Left Ventricle:                  Normal   Aorta/Left Atrium:               Normal  IVSd (2D):              1.01 cm (0.7-1.1) Aortic Root (Mmode): 3.93 cm   (2.4-3.7)  LVPWd (2D):             1.19 cm (0.7-1.1) AoV Cusp Separation: 1.70 cm   (1.5-2.6)  LVIDd (2D):             4.68 cm (3.4-5.7) Left Atrium (Mmode): 4.04 cm   (1.9-4.0)  LVIDs (2D):             3.76 cm  LV FS (2D):             19.5 %   (>25%)  LV EF (2D):              40 %    (>55%)  Relative Wall Thickness  0.51    (<0.42)    LV DIASTOLIC FUNCTION:  MV Peak E: 1.15 m/s Decel Time: 424 msec  MV Peak A: 0.12 m/s  E/A Ratio: 9.44    SPECTRAL DOPPLER ANALYSIS (where applicable):  Mitral Valve:  MV P1/2 Time: 123.00 msec  MV Area, PHT: 1.79 cm²    Aortic Valve: AoV Max Inder: 1.45 m/s AoV Peak P.4 mmHg AoV Mean P.5 mmHg    LVOT Vmax: 0.42 m/s LVOT VTI: 0.084 m LVOT Diameter: 2.10 cm    AoV Area, Vmax: 1.00 cm² AoV Area, VTI: 1.37 cm² AoV Area, Vmn: 1.30 cm²  Ao VTI: 0.212  Tricuspid Valve and PA/RV Systolic Pressure: TR Max Velocity: 2.33 m/s RA   Pressure: 10 mmHg RVSP/PASP: 31.6 mmHg      PHYSICIAN INTERPRETATION:  Left Ventricle: The left ventricular internal cavity size is normal.  Global LV systolic function was normal. Left ventricular ejection   fraction, by visual estimation, is 45 to 50%. Abnormal (paradoxical)   septal motion consistent with post-operative status. The left ventricular   diastolic function could not be assessed in this study.  Right Ventricle: The right ventricle was not well visualized.  Left Atrium: Severely enlarged left atrium.  Right Atrium: Moderately enlarged right atrium.  Pericardium: Trivial pericardial effusion is present. The pericardial   effusion is globally located around the entire heart.  Mitral Valve: There ismild mitral annular calcification. MV Prosthetic   Valve.  Tricuspid Valve: The tricuspid valve is normal in structure.   Mild-moderate tricuspid regurgitation is visualized.  Aortic Valve: The aortic valve is trileaflet. Mild aortic stenosis is   present. Trivial aortic valve regurgitation is seen.  Pulmonic Valve: Structurally normal pulmonic valve, with normal leaflet   excursion. The pulmonic valve is normal. Mild pulmonic valve   regurgitation.  Aorta: There is dilatation of the aortic root.      Summary:   1. 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 Biatrial enlargement   4. Left ventricular ejection fraction, by visual estimation, is 45 to   50%.   5. Mild mitral annular calcification.   6. Mild-moderate tricuspid regurgitation.   7. Mild aortic valve stenosis.    [id number] Pilo Lozano , Electronically signed on 10/1/2023   at 11:45:16 AM    < end of copied text >          Assessment:  Patient with mvr, ppm s/p removal for mssa infection, micra ppm, esrd on dialysis via catheter, clostridium bacteremia in August from proctitis, came in with high grade sustained mrsa bacteremia. He had permacath pulled, latest bc form 10/6 still positive, new ones just sent today. He is now on ceftaroline and vanco. He is not a surgical candidate for valve surgery so talya being deferred. this could be a terminal infection.   Plan:  continue antibiotics with vanco and ceftaroline  f/u latest blood cx  goc discussions CC: f/u for  mrsa bacteremia  Patient reports  nothing, not bverbal  REVIEW OF SYSTEMS:  All other review of systems negative (Comprehensive ROS)cannot get    Antimicrobials Day #  :4 synergistic tx  ceftaroline fosamil IVPB 200 milliGRAM(s) IV Intermittent every 8 hours  ceftaroline fosamil IVPB 200 milliGRAM(s) IV Intermittent once  ceftaroline fosamil IVPB      vancomycin  IVPB 750 milliGRAM(s) IV Intermittent <User Schedule>    Other Medications Reviewed    T(F): 97.9 (10-09-23 @ 12:30), Max: 99.9 (10-08-23 @ 21:19)  HR: 93 (10-09-23 @ 13:07)  BP: 89/52 (10-09-23 @ 13:07)  RR: 18 (10-09-23 @ 12:30)  SpO2: 92% (10-09-23 @ 12:30)  Wt(kg): --    PHYSICAL EXAM:  General: on hd, poorly responsive  no acute distress  Eyes:  anicteric, no conjunctival injection, no discharge  Oropharynx: no lesions or injection 	  Neck: supple, without adenopathy, shiley   Lungs: clear to auscultation  Heart: regular rate and rhythm; no murmur, rubs or gallops  Abdomen: soft, nondistended, nontender, without mass or organomegaly  Skin: no lesions  Extremities: no clubbing, cyanosis, or edema  Neurologic: poorly responsive     LAB RESULTS:                        7.8    18.06 )-----------( 111      ( 09 Oct 2023 07:00 )             22.2     10-    134<L>  |  98  |  82<H>  ----------------------------<  96  4.1   |  27  |  5.27<H>    Ca    10.1      09 Oct 2023 07:00  Phos  3.2     10-  Mg     1.9     10-    TPro  6.0  /  Alb  2.1<L>  /  TBili  0.8  /  DBili  x   /  AST  25  /  ALT  27  /  AlkPhos  243<H>  10    LIVER FUNCTIONS - ( 09 Oct 2023 07:00 )  Alb: 2.1 g/dL / Pro: 6.0 g/dL / ALK PHOS: 243 U/L / ALT: 27 U/L / AST: 25 U/L / GGT: x           Urinalysis Basic - ( 09 Oct 2023 07:00 )    Color: x / Appearance: x / SG: x / pH: x  Gluc: 96 mg/dL / Ketone: x  / Bili: x / Urobili: x   Blood: x / Protein: x / Nitrite: x   Leuk Esterase: x / RBC: x / WBC x   Sq Epi: x / Non Sq Epi: x / Bacteria: x      MICROBIOLOGY:  RECENT CULTURES:  10-06 @ 10:20 .Blood Blood-Peripheral     Growth in aerobic and anaerobic bottles: Methicillin Resistant  Staphylococcus aureus  See previous culture [id number]    Growth in aerobic bottle: Gram Positive Cocci in Clusters  Growth in anaerobic bottle: Gram Positive Cocci in Clusters        RADIOLOGY REVIEWED:  < from: Xray Chest 1 View- PORTABLE-Routine (Xray Chest 1 View- PORTABLE-Routine in AM.) (10.06.23 @ 08:23) >    ACC: [id number] EXAM:  XR CHEST PORTABLE ROUTINE 1V   ORDERED BY: JULIA CRUZ     PROCEDURE DATE:  10/06/2023          INTERPRETATION:  CLINICAL INDICATION: 78 years  Male with eval pna.    COMPARISON: 10/5/2023    Right central line tip overlies the superior vena cava. Enteric catheter   tip is below the diaphragm.    AP view of the chest demonstrates persistent moderate right pleural   effusion and no left pleural effusion. The pulmonary vasculature is   normal.    The heart is enlarged. Sternotomy and cardiac valve prosthesis. A   leadless pacemaker and left atrial appendage clips are reidentified.    The mediastinum and robert cannot be assessed due to rotation.    Mild thoracic degenerative changes are present.    IMPRESSION:    No significant change.    Moderate right pleural effusion. Cannot exclude underlying infiltrate or   atelectasis.    Cardiomegaly.    Right central line and enteric catheter in satisfactory position.    --- End of Report ---            < end of copied text >      < from: TTE Echo Complete w/o Contrast w/ Doppler (10.01.23 @ 10:26) >    ACC: [id number] EXAM:  ECHO TTE WO CON COMP W DOPP                          PROCEDURE DATE:  10/01/2023          INTERPRETATION:  TRANSTHORACIC ECHOCARDIOGRAM REPORT        Patient Name:   JANEE ARCEO Patient Location: [address] Rec #:  [id number] Accession #:      [id number]  Account #:      [id number]   Height:           67.7 in 172.0 cm  YOB: 1945  Weight:           158.7 lb 72.00 kg  Patient Age:    78 years   BSA:              1.85 m²  Patient Gender: M          BP:    97/42 mmHg      Date of Exam:        10/1/2023 10:26:05 AM  Sonographer:         RON  Referring Physician: NANCY    Procedure:     2D Echo/Doppler/Color Doppler Complete.  Indications:   I33.0 - Acute and subacute infective endocarditis  Diagnosis:     I33.0 - Acute and subacute infective endocarditis  Study Details: Technically adequate study. Study quality was adversely   affected                 due to Apical view is limited.        2D AND M-MODE MEASUREMENTS (normal ranges within parentheses):  Left Ventricle:                  Normal   Aorta/Left Atrium:               Normal  IVSd (2D):              1.01 cm (0.7-1.1) Aortic Root (Mmode): 3.93 cm   (2.4-3.7)  LVPWd (2D):             1.19 cm (0.7-1.1) AoV Cusp Separation: 1.70 cm   (1.5-2.6)  LVIDd (2D):             4.68 cm (3.4-5.7) Left Atrium (Mmode): 4.04 cm   (1.9-4.0)  LVIDs (2D):             3.76 cm  LV FS (2D):             19.5 %   (>25%)  LV EF (2D):              40 %    (>55%)  Relative Wall Thickness  0.51    (<0.42)    LV DIASTOLIC FUNCTION:  MV Peak E: 1.15 m/s Decel Time: 424 msec  MV Peak A: 0.12 m/s  E/A Ratio: 9.44    SPECTRAL DOPPLER ANALYSIS (where applicable):  Mitral Valve:  MV P1/2 Time: 123.00 msec  MV Area, PHT: 1.79 cm²    Aortic Valve: AoV Max Inder: 1.45 m/s AoV Peak P.4 mmHg AoV Mean P.5 mmHg    LVOT Vmax: 0.42 m/s LVOT VTI: 0.084 m LVOT Diameter: 2.10 cm    AoV Area, Vmax: 1.00 cm² AoV Area, VTI: 1.37 cm² AoV Area, Vmn: 1.30 cm²  Ao VTI: 0.212  Tricuspid Valve and PA/RV Systolic Pressure: TR Max Velocity: 2.33 m/s RA   Pressure: 10 mmHg RVSP/PASP: 31.6 mmHg      PHYSICIAN INTERPRETATION:  Left Ventricle: The left ventricular internal cavity size is normal.  Global LV systolic function was normal. Left ventricular ejection   fraction, by visual estimation, is 45 to 50%. Abnormal (paradoxical)   septal motion consistent with post-operative status. The left ventricular   diastolic function could not be assessed in this study.  Right Ventricle: The right ventricle was not well visualized.  Left Atrium: Severely enlarged left atrium.  Right Atrium: Moderately enlarged right atrium.  Pericardium: Trivial pericardial effusion is present. The pericardial   effusion is globally located around the entire heart.  Mitral Valve: There ismild mitral annular calcification. MV Prosthetic   Valve.  Tricuspid Valve: The tricuspid valve is normal in structure.   Mild-moderate tricuspid regurgitation is visualized.  Aortic Valve: The aortic valve is trileaflet. Mild aortic stenosis is   present. Trivial aortic valve regurgitation is seen.  Pulmonic Valve: Structurally normal pulmonic valve, with normal leaflet   excursion. The pulmonic valve is normal. Mild pulmonic valve   regurgitation.  Aorta: There is dilatation of the aortic root.      Summary:   1. 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 Biatrial enlargement   4. Left ventricular ejection fraction, by visual estimation, is 45 to   50%.   5. Mild mitral annular calcification.   6. Mild-moderate tricuspid regurgitation.   7. Mild aortic valve stenosis.    [id number] Pilo Lozano , Electronically signed on 10/1/2023   at 11:45:16 AM    < end of copied text >          Assessment:  Patient with mvr, ppm s/p removal for mssa infection, micra ppm, esrd on dialysis via catheter, clostridium bacteremia in August from proctitis, came in with high grade sustained mrsa bacteremia. He had permacath pulled, latest bc form 10/6 still positive, new ones just sent today. He is now on ceftaroline and vanco. He is not a surgical candidate for valve surgery so talya being deferred. this could be a terminal infection.   Plan:  continue antibiotics with vanco and ceftaroline  f/u latest blood cx  goc discussions CC: f/u for  mrsa bacteremia  Patient reports  nothing, not bverbal  REVIEW OF SYSTEMS:  All other review of systems negative (Comprehensive ROS)cannot get    Antimicrobials Day #  :4 synergistic tx  ceftaroline fosamil IVPB 200 milliGRAM(s) IV Intermittent every 8 hours  ceftaroline fosamil IVPB 200 milliGRAM(s) IV Intermittent once  ceftaroline fosamil IVPB      vancomycin  IVPB 750 milliGRAM(s) IV Intermittent <User Schedule>    Other Medications Reviewed    T(F): 97.9 (10-09-23 @ 12:30), Max: 99.9 (10-08-23 @ 21:19)  HR: 93 (10-09-23 @ 13:07)  BP: 89/52 (10-09-23 @ 13:07)  RR: 18 (10-09-23 @ 12:30)  SpO2: 92% (10-09-23 @ 12:30)  Wt(kg): --    PHYSICAL EXAM:  General: on hd, poorly responsive  no acute distress  Eyes:  anicteric, no conjunctival injection, no discharge  Oropharynx: no lesions or injection 	  Neck: supple, without adenopathy, shiley   Lungs: clear to auscultation  Heart: regular rate and rhythm; no murmur, rubs or gallops  Abdomen: soft, nondistended, nontender, without mass or organomegaly  Skin: no lesions  Extremities: no clubbing, cyanosis, or edema  Neurologic: poorly responsive     LAB RESULTS:                        7.8    18.06 )-----------( 111      ( 09 Oct 2023 07:00 )             22.2     10-    134<L>  |  98  |  82<H>  ----------------------------<  96  4.1   |  27  |  5.27<H>    Ca    10.1      09 Oct 2023 07:00  Phos  3.2     10-  Mg     1.9     10-    TPro  6.0  /  Alb  2.1<L>  /  TBili  0.8  /  DBili  x   /  AST  25  /  ALT  27  /  AlkPhos  243<H>  10    LIVER FUNCTIONS - ( 09 Oct 2023 07:00 )  Alb: 2.1 g/dL / Pro: 6.0 g/dL / ALK PHOS: 243 U/L / ALT: 27 U/L / AST: 25 U/L / GGT: x           Urinalysis Basic - ( 09 Oct 2023 07:00 )    Color: x / Appearance: x / SG: x / pH: x  Gluc: 96 mg/dL / Ketone: x  / Bili: x / Urobili: x   Blood: x / Protein: x / Nitrite: x   Leuk Esterase: x / RBC: x / WBC x   Sq Epi: x / Non Sq Epi: x / Bacteria: x      MICROBIOLOGY:  RECENT CULTURES:  10-06 @ 10:20 .Blood Blood-Peripheral     Growth in aerobic and anaerobic bottles: Methicillin Resistant  Staphylococcus aureus  See previous culture [id number]    Growth in aerobic bottle: Gram Positive Cocci in Clusters  Growth in anaerobic bottle: Gram Positive Cocci in Clusters        RADIOLOGY REVIEWED:  < from: Xray Chest 1 View- PORTABLE-Routine (Xray Chest 1 View- PORTABLE-Routine in AM.) (10.06.23 @ 08:23) >    ACC: [id number] EXAM:  XR CHEST PORTABLE ROUTINE 1V   ORDERED BY: JULIA CRUZ     PROCEDURE DATE:  10/06/2023          INTERPRETATION:  CLINICAL INDICATION: 78 years  Male with eval pna.    COMPARISON: 10/5/2023    Right central line tip overlies the superior vena cava. Enteric catheter   tip is below the diaphragm.    AP view of the chest demonstrates persistent moderate right pleural   effusion and no left pleural effusion. The pulmonary vasculature is   normal.    The heart is enlarged. Sternotomy and cardiac valve prosthesis. A   leadless pacemaker and left atrial appendage clips are reidentified.    The mediastinum and robert cannot be assessed due to rotation.    Mild thoracic degenerative changes are present.    IMPRESSION:    No significant change.    Moderate right pleural effusion. Cannot exclude underlying infiltrate or   atelectasis.    Cardiomegaly.    Right central line and enteric catheter in satisfactory position.    --- End of Report ---            < end of copied text >      < from: TTE Echo Complete w/o Contrast w/ Doppler (10.01.23 @ 10:26) >    ACC: [id number] EXAM:  ECHO TTE WO CON COMP W DOPP                          PROCEDURE DATE:  10/01/2023          INTERPRETATION:  TRANSTHORACIC ECHOCARDIOGRAM REPORT        Patient Name:   JANEE ARCEO Patient Location: [address] Rec #:  [id number] Accession #:      [id number]  Account #:      [id number]   Height:           67.7 in 172.0 cm  YOB: 1945  Weight:           158.7 lb 72.00 kg  Patient Age:    78 years   BSA:              1.85 m²  Patient Gender: M          BP:    97/42 mmHg      Date of Exam:        10/1/2023 10:26:05 AM  Sonographer:         RON  Referring Physician: NANCY    Procedure:     2D Echo/Doppler/Color Doppler Complete.  Indications:   I33.0 - Acute and subacute infective endocarditis  Diagnosis:     I33.0 - Acute and subacute infective endocarditis  Study Details: Technically adequate study. Study quality was adversely   affected                 due to Apical view is limited.        2D AND M-MODE MEASUREMENTS (normal ranges within parentheses):  Left Ventricle:                  Normal   Aorta/Left Atrium:               Normal  IVSd (2D):              1.01 cm (0.7-1.1) Aortic Root (Mmode): 3.93 cm   (2.4-3.7)  LVPWd (2D):             1.19 cm (0.7-1.1) AoV Cusp Separation: 1.70 cm   (1.5-2.6)  LVIDd (2D):             4.68 cm (3.4-5.7) Left Atrium (Mmode): 4.04 cm   (1.9-4.0)  LVIDs (2D):             3.76 cm  LV FS (2D):             19.5 %   (>25%)  LV EF (2D):              40 %    (>55%)  Relative Wall Thickness  0.51    (<0.42)    LV DIASTOLIC FUNCTION:  MV Peak E: 1.15 m/s Decel Time: 424 msec  MV Peak A: 0.12 m/s  E/A Ratio: 9.44    SPECTRAL DOPPLER ANALYSIS (where applicable):  Mitral Valve:  MV P1/2 Time: 123.00 msec  MV Area, PHT: 1.79 cm²    Aortic Valve: AoV Max Inder: 1.45 m/s AoV Peak P.4 mmHg AoV Mean P.5 mmHg    LVOT Vmax: 0.42 m/s LVOT VTI: 0.084 m LVOT Diameter: 2.10 cm    AoV Area, Vmax: 1.00 cm² AoV Area, VTI: 1.37 cm² AoV Area, Vmn: 1.30 cm²  Ao VTI: 0.212  Tricuspid Valve and PA/RV Systolic Pressure: TR Max Velocity: 2.33 m/s RA   Pressure: 10 mmHg RVSP/PASP: 31.6 mmHg      PHYSICIAN INTERPRETATION:  Left Ventricle: The left ventricular internal cavity size is normal.  Global LV systolic function was normal. Left ventricular ejection   fraction, by visual estimation, is 45 to 50%. Abnormal (paradoxical)   septal motion consistent with post-operative status. The left ventricular   diastolic function could not be assessed in this study.  Right Ventricle: The right ventricle was not well visualized.  Left Atrium: Severely enlarged left atrium.  Right Atrium: Moderately enlarged right atrium.  Pericardium: Trivial pericardial effusion is present. The pericardial   effusion is globally located around the entire heart.  Mitral Valve: There ismild mitral annular calcification. MV Prosthetic   Valve.  Tricuspid Valve: The tricuspid valve is normal in structure.   Mild-moderate tricuspid regurgitation is visualized.  Aortic Valve: The aortic valve is trileaflet. Mild aortic stenosis is   present. Trivial aortic valve regurgitation is seen.  Pulmonic Valve: Structurally normal pulmonic valve, with normal leaflet   excursion. The pulmonic valve is normal. Mild pulmonic valve   regurgitation.  Aorta: There is dilatation of the aortic root.      Summary:   1. 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 Biatrial enlargement   4. Left ventricular ejection fraction, by visual estimation, is 45 to   50%.   5. Mild mitral annular calcification.   6. Mild-moderate tricuspid regurgitation.   7. Mild aortic valve stenosis.    [id number] Pilo Lozano , Electronically signed on 10/1/2023   at 11:45:16 AM    < end of copied text >          Assessment:  Patient with mvr, ppm s/p removal for mssa infection, micra ppm, esrd on dialysis via catheter, clostridium bacteremia in August from proctitis, came in with high grade sustained mrsa bacteremia. He had permacath pulled, latest bc form 10/6 still positive, new ones just sent today. He is now on ceftaroline and vanco. He is not a surgical candidate for valve surgery so talya being deferred. this could be a terminal infection.   Plan:  continue antibiotics with vanco and ceftaroline  f/u latest blood cx  goc discussions CC: f/u for  mrsa bacteremia  Patient reports  nothing, not bverbal  REVIEW OF SYSTEMS:  All other review of systems negative (Comprehensive ROS)cannot get    Antimicrobials Day #  :4 synergistic tx  ceftaroline fosamil IVPB 200 milliGRAM(s) IV Intermittent every 8 hours  ceftaroline fosamil IVPB 200 milliGRAM(s) IV Intermittent once  ceftaroline fosamil IVPB      vancomycin  IVPB 750 milliGRAM(s) IV Intermittent <User Schedule>    Other Medications Reviewed    T(F): 97.9 (10-09-23 @ 12:30), Max: 99.9 (10-08-23 @ 21:19)  HR: 93 (10-09-23 @ 13:07)  BP: 89/52 (10-09-23 @ 13:07)  RR: 18 (10-09-23 @ 12:30)  SpO2: 92% (10-09-23 @ 12:30)  Wt(kg): --    PHYSICAL EXAM:  General: on hd, poorly responsive  no acute distress  Eyes:  anicteric, no conjunctival injection, no discharge  Oropharynx: no lesions or injection 	  Neck: supple, without adenopathy, shiley   Lungs: clear to auscultation  Heart: regular rate and rhythm; no murmur, rubs or gallops  Abdomen: soft, nondistended, nontender, without mass or organomegaly  Skin: no lesions  Extremities: no clubbing, cyanosis, or edema  Neurologic: poorly responsive     LAB RESULTS:                        7.8    18.06 )-----------( 111      ( 09 Oct 2023 07:00 )             22.2     10-    134<L>  |  98  |  82<H>  ----------------------------<  96  4.1   |  27  |  5.27<H>    Ca    10.1      09 Oct 2023 07:00  Phos  3.2     10-  Mg     1.9     10-    TPro  6.0  /  Alb  2.1<L>  /  TBili  0.8  /  DBili  x   /  AST  25  /  ALT  27  /  AlkPhos  243<H>  10    LIVER FUNCTIONS - ( 09 Oct 2023 07:00 )  Alb: 2.1 g/dL / Pro: 6.0 g/dL / ALK PHOS: 243 U/L / ALT: 27 U/L / AST: 25 U/L / GGT: x           Urinalysis Basic - ( 09 Oct 2023 07:00 )    Color: x / Appearance: x / SG: x / pH: x  Gluc: 96 mg/dL / Ketone: x  / Bili: x / Urobili: x   Blood: x / Protein: x / Nitrite: x   Leuk Esterase: x / RBC: x / WBC x   Sq Epi: x / Non Sq Epi: x / Bacteria: x      MICROBIOLOGY:  RECENT CULTURES:  10-06 @ 10:20 .Blood Blood-Peripheral     Growth in aerobic and anaerobic bottles: Methicillin Resistant  Staphylococcus aureus  See previous culture [id number]    Growth in aerobic bottle: Gram Positive Cocci in Clusters  Growth in anaerobic bottle: Gram Positive Cocci in Clusters        RADIOLOGY REVIEWED:  < from: Xray Chest 1 View- PORTABLE-Routine (Xray Chest 1 View- PORTABLE-Routine in AM.) (10.06.23 @ 08:23) >    ACC: [id number] EXAM:  XR CHEST PORTABLE ROUTINE 1V   ORDERED BY: JULIA CRUZ     PROCEDURE DATE:  10/06/2023          INTERPRETATION:  CLINICAL INDICATION: 78 years  Male with eval pna.    COMPARISON: 10/5/2023    Right central line tip overlies the superior vena cava. Enteric catheter   tip is below the diaphragm.    AP view of the chest demonstrates persistent moderate right pleural   effusion and no left pleural effusion. The pulmonary vasculature is   normal.    The heart is enlarged. Sternotomy and cardiac valve prosthesis. A   leadless pacemaker and left atrial appendage clips are reidentified.    The mediastinum and robert cannot be assessed due to rotation.    Mild thoracic degenerative changes are present.    IMPRESSION:    No significant change.    Moderate right pleural effusion. Cannot exclude underlying infiltrate or   atelectasis.    Cardiomegaly.    Right central line and enteric catheter in satisfactory position.    --- End of Report ---            < end of copied text >      < from: TTE Echo Complete w/o Contrast w/ Doppler (10.01.23 @ 10:26) >    ACC: [id number] EXAM:  ECHO TTE WO CON COMP W DOPP                          PROCEDURE DATE:  10/01/2023          INTERPRETATION:  TRANSTHORACIC ECHOCARDIOGRAM REPORT        Patient Name:   JANEE ARCEO Patient Location: [address] Rec #:  [id number] Accession #:      [id number]  Account #:      [id number]   Height:           67.7 in 172.0 cm  YOB: 1945  Weight:           158.7 lb 72.00 kg  Patient Age:    78 years   BSA:              1.85 m²  Patient Gender: M          BP:    97/42 mmHg      Date of Exam:        10/1/2023 10:26:05 AM  Sonographer:         RON  Referring Physician: NANCY    Procedure:     2D Echo/Doppler/Color Doppler Complete.  Indications:   I33.0 - Acute and subacute infective endocarditis  Diagnosis:     I33.0 - Acute and subacute infective endocarditis  Study Details: Technically adequate study. Study quality was adversely   affected                 due to Apical view is limited.        2D AND M-MODE MEASUREMENTS (normal ranges within parentheses):  Left Ventricle:                  Normal   Aorta/Left Atrium:               Normal  IVSd (2D):              1.01 cm (0.7-1.1) Aortic Root (Mmode): 3.93 cm   (2.4-3.7)  LVPWd (2D):             1.19 cm (0.7-1.1) AoV Cusp Separation: 1.70 cm   (1.5-2.6)  LVIDd (2D):             4.68 cm (3.4-5.7) Left Atrium (Mmode): 4.04 cm   (1.9-4.0)  LVIDs (2D):             3.76 cm  LV FS (2D):             19.5 %   (>25%)  LV EF (2D):              40 %    (>55%)  Relative Wall Thickness  0.51    (<0.42)    LV DIASTOLIC FUNCTION:  MV Peak E: 1.15 m/s Decel Time: 424 msec  MV Peak A: 0.12 m/s  E/A Ratio: 9.44    SPECTRAL DOPPLER ANALYSIS (where applicable):  Mitral Valve:  MV P1/2 Time: 123.00 msec  MV Area, PHT: 1.79 cm²    Aortic Valve: AoV Max Inder: 1.45 m/s AoV Peak P.4 mmHg AoV Mean P.5 mmHg    LVOT Vmax: 0.42 m/s LVOT VTI: 0.084 m LVOT Diameter: 2.10 cm    AoV Area, Vmax: 1.00 cm² AoV Area, VTI: 1.37 cm² AoV Area, Vmn: 1.30 cm²  Ao VTI: 0.212  Tricuspid Valve and PA/RV Systolic Pressure: TR Max Velocity: 2.33 m/s RA   Pressure: 10 mmHg RVSP/PASP: 31.6 mmHg      PHYSICIAN INTERPRETATION:  Left Ventricle: The left ventricular internal cavity size is normal.  Global LV systolic function was normal. Left ventricular ejection   fraction, by visual estimation, is 45 to 50%. Abnormal (paradoxical)   septal motion consistent with post-operative status. The left ventricular   diastolic function could not be assessed in this study.  Right Ventricle: The right ventricle was not well visualized.  Left Atrium: Severely enlarged left atrium.  Right Atrium: Moderately enlarged right atrium.  Pericardium: Trivial pericardial effusion is present. The pericardial   effusion is globally located around the entire heart.  Mitral Valve: There ismild mitral annular calcification. MV Prosthetic   Valve.  Tricuspid Valve: The tricuspid valve is normal in structure.   Mild-moderate tricuspid regurgitation is visualized.  Aortic Valve: The aortic valve is trileaflet. Mild aortic stenosis is   present. Trivial aortic valve regurgitation is seen.  Pulmonic Valve: Structurally normal pulmonic valve, with normal leaflet   excursion. The pulmonic valve is normal. Mild pulmonic valve   regurgitation.  Aorta: There is dilatation of the aortic root.      Summary:   1. 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 Biatrial enlargement   4. Left ventricular ejection fraction, by visual estimation, is 45 to   50%.   5. Mild mitral annular calcification.   6. Mild-moderate tricuspid regurgitation.   7. Mild aortic valve stenosis.    [id number] Pilo Lozano , Electronically signed on 10/1/2023   at 11:45:16 AM    < end of copied text >          Assessment:  Patient with mvr, ppm s/p removal for mssa infection, micra ppm, esrd on dialysis via catheter, clostridium bacteremia in August from proctitis, came in with high grade sustained mrsa bacteremia. He had permacath pulled, latest bc form 10/6 still positive, new ones just sent today. He is now on ceftaroline and vanco. He is not a surgical candidate for valve surgery so talya being deferred. this could be a terminal infection.   Plan:  continue antibiotics with vanco and ceftaroline  f/u latest blood cx  doppler arms to see if clots  goc discussions CC: f/u for  mrsa bacteremia  Patient reports  nothing, not bverbal  REVIEW OF SYSTEMS:  All other review of systems negative (Comprehensive ROS)cannot get    Antimicrobials Day #  :4 synergistic tx  ceftaroline fosamil IVPB 200 milliGRAM(s) IV Intermittent every 8 hours  ceftaroline fosamil IVPB 200 milliGRAM(s) IV Intermittent once  ceftaroline fosamil IVPB      vancomycin  IVPB 750 milliGRAM(s) IV Intermittent <User Schedule>    Other Medications Reviewed    T(F): 97.9 (10-09-23 @ 12:30), Max: 99.9 (10-08-23 @ 21:19)  HR: 93 (10-09-23 @ 13:07)  BP: 89/52 (10-09-23 @ 13:07)  RR: 18 (10-09-23 @ 12:30)  SpO2: 92% (10-09-23 @ 12:30)  Wt(kg): --    PHYSICAL EXAM:  General: on hd, poorly responsive  no acute distress  Eyes:  anicteric, no conjunctival injection, no discharge  Oropharynx: no lesions or injection 	  Neck: supple, without adenopathy, shiley   Lungs: clear to auscultation  Heart: regular rate and rhythm; no murmur, rubs or gallops  Abdomen: soft, nondistended, nontender, without mass or organomegaly  Skin: no lesions  Extremities: no clubbing, cyanosis, or edema  Neurologic: poorly responsive     LAB RESULTS:                        7.8    18.06 )-----------( 111      ( 09 Oct 2023 07:00 )             22.2     10-    134<L>  |  98  |  82<H>  ----------------------------<  96  4.1   |  27  |  5.27<H>    Ca    10.1      09 Oct 2023 07:00  Phos  3.2     10-  Mg     1.9     10-    TPro  6.0  /  Alb  2.1<L>  /  TBili  0.8  /  DBili  x   /  AST  25  /  ALT  27  /  AlkPhos  243<H>  10    LIVER FUNCTIONS - ( 09 Oct 2023 07:00 )  Alb: 2.1 g/dL / Pro: 6.0 g/dL / ALK PHOS: 243 U/L / ALT: 27 U/L / AST: 25 U/L / GGT: x           Urinalysis Basic - ( 09 Oct 2023 07:00 )    Color: x / Appearance: x / SG: x / pH: x  Gluc: 96 mg/dL / Ketone: x  / Bili: x / Urobili: x   Blood: x / Protein: x / Nitrite: x   Leuk Esterase: x / RBC: x / WBC x   Sq Epi: x / Non Sq Epi: x / Bacteria: x      MICROBIOLOGY:  RECENT CULTURES:  10-06 @ 10:20 .Blood Blood-Peripheral     Growth in aerobic and anaerobic bottles: Methicillin Resistant  Staphylococcus aureus  See previous culture [id number]    Growth in aerobic bottle: Gram Positive Cocci in Clusters  Growth in anaerobic bottle: Gram Positive Cocci in Clusters        RADIOLOGY REVIEWED:  < from: Xray Chest 1 View- PORTABLE-Routine (Xray Chest 1 View- PORTABLE-Routine in AM.) (10.06.23 @ 08:23) >    ACC: [id number] EXAM:  XR CHEST PORTABLE ROUTINE 1V   ORDERED BY: JULIA CRUZ     PROCEDURE DATE:  10/06/2023          INTERPRETATION:  CLINICAL INDICATION: 78 years  Male with eval pna.    COMPARISON: 10/5/2023    Right central line tip overlies the superior vena cava. Enteric catheter   tip is below the diaphragm.    AP view of the chest demonstrates persistent moderate right pleural   effusion and no left pleural effusion. The pulmonary vasculature is   normal.    The heart is enlarged. Sternotomy and cardiac valve prosthesis. A   leadless pacemaker and left atrial appendage clips are reidentified.    The mediastinum and robert cannot be assessed due to rotation.    Mild thoracic degenerative changes are present.    IMPRESSION:    No significant change.    Moderate right pleural effusion. Cannot exclude underlying infiltrate or   atelectasis.    Cardiomegaly.    Right central line and enteric catheter in satisfactory position.    --- End of Report ---            < end of copied text >      < from: TTE Echo Complete w/o Contrast w/ Doppler (10.01.23 @ 10:26) >    ACC: [id number] EXAM:  ECHO TTE WO CON COMP W DOPP                          PROCEDURE DATE:  10/01/2023          INTERPRETATION:  TRANSTHORACIC ECHOCARDIOGRAM REPORT        Patient Name:   JANEE ARCEO Patient Location: [address] Rec #:  [id number] Accession #:      [id number]  Account #:      [id number]   Height:           67.7 in 172.0 cm  YOB: 1945  Weight:           158.7 lb 72.00 kg  Patient Age:    78 years   BSA:              1.85 m²  Patient Gender: M          BP:    97/42 mmHg      Date of Exam:        10/1/2023 10:26:05 AM  Sonographer:         RON  Referring Physician: NANCY    Procedure:     2D Echo/Doppler/Color Doppler Complete.  Indications:   I33.0 - Acute and subacute infective endocarditis  Diagnosis:     I33.0 - Acute and subacute infective endocarditis  Study Details: Technically adequate study. Study quality was adversely   affected                 due to Apical view is limited.        2D AND M-MODE MEASUREMENTS (normal ranges within parentheses):  Left Ventricle:                  Normal   Aorta/Left Atrium:               Normal  IVSd (2D):              1.01 cm (0.7-1.1) Aortic Root (Mmode): 3.93 cm   (2.4-3.7)  LVPWd (2D):             1.19 cm (0.7-1.1) AoV Cusp Separation: 1.70 cm   (1.5-2.6)  LVIDd (2D):             4.68 cm (3.4-5.7) Left Atrium (Mmode): 4.04 cm   (1.9-4.0)  LVIDs (2D):             3.76 cm  LV FS (2D):             19.5 %   (>25%)  LV EF (2D):              40 %    (>55%)  Relative Wall Thickness  0.51    (<0.42)    LV DIASTOLIC FUNCTION:  MV Peak E: 1.15 m/s Decel Time: 424 msec  MV Peak A: 0.12 m/s  E/A Ratio: 9.44    SPECTRAL DOPPLER ANALYSIS (where applicable):  Mitral Valve:  MV P1/2 Time: 123.00 msec  MV Area, PHT: 1.79 cm²    Aortic Valve: AoV Max Inder: 1.45 m/s AoV Peak P.4 mmHg AoV Mean P.5 mmHg    LVOT Vmax: 0.42 m/s LVOT VTI: 0.084 m LVOT Diameter: 2.10 cm    AoV Area, Vmax: 1.00 cm² AoV Area, VTI: 1.37 cm² AoV Area, Vmn: 1.30 cm²  Ao VTI: 0.212  Tricuspid Valve and PA/RV Systolic Pressure: TR Max Velocity: 2.33 m/s RA   Pressure: 10 mmHg RVSP/PASP: 31.6 mmHg      PHYSICIAN INTERPRETATION:  Left Ventricle: The left ventricular internal cavity size is normal.  Global LV systolic function was normal. Left ventricular ejection   fraction, by visual estimation, is 45 to 50%. Abnormal (paradoxical)   septal motion consistent with post-operative status. The left ventricular   diastolic function could not be assessed in this study.  Right Ventricle: The right ventricle was not well visualized.  Left Atrium: Severely enlarged left atrium.  Right Atrium: Moderately enlarged right atrium.  Pericardium: Trivial pericardial effusion is present. The pericardial   effusion is globally located around the entire heart.  Mitral Valve: There ismild mitral annular calcification. MV Prosthetic   Valve.  Tricuspid Valve: The tricuspid valve is normal in structure.   Mild-moderate tricuspid regurgitation is visualized.  Aortic Valve: The aortic valve is trileaflet. Mild aortic stenosis is   present. Trivial aortic valve regurgitation is seen.  Pulmonic Valve: Structurally normal pulmonic valve, with normal leaflet   excursion. The pulmonic valve is normal. Mild pulmonic valve   regurgitation.  Aorta: There is dilatation of the aortic root.      Summary:   1. 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 Biatrial enlargement   4. Left ventricular ejection fraction, by visual estimation, is 45 to   50%.   5. Mild mitral annular calcification.   6. Mild-moderate tricuspid regurgitation.   7. Mild aortic valve stenosis.    [id number] Pilo Lozano , Electronically signed on 10/1/2023   at 11:45:16 AM    < end of copied text >          Assessment:  Patient with mvr, ppm s/p removal for mssa infection, micra ppm, esrd on dialysis via catheter, clostridium bacteremia in August from proctitis, came in with high grade sustained mrsa bacteremia. He had permacath pulled, latest bc form 10/6 still positive, new ones just sent today. He is now on ceftaroline and vanco. He is not a surgical candidate for valve surgery so talya being deferred. this could be a terminal infection.   Plan:  continue antibiotics with vanco and ceftaroline  f/u latest blood cx  doppler arms to see if clots  goc discussions CC: f/u for  mrsa bacteremia  Patient reports  nothing, not bverbal  REVIEW OF SYSTEMS:  All other review of systems negative (Comprehensive ROS)cannot get    Antimicrobials Day #  :4 synergistic tx  ceftaroline fosamil IVPB 200 milliGRAM(s) IV Intermittent every 8 hours  ceftaroline fosamil IVPB 200 milliGRAM(s) IV Intermittent once  ceftaroline fosamil IVPB      vancomycin  IVPB 750 milliGRAM(s) IV Intermittent <User Schedule>    Other Medications Reviewed    T(F): 97.9 (10-09-23 @ 12:30), Max: 99.9 (10-08-23 @ 21:19)  HR: 93 (10-09-23 @ 13:07)  BP: 89/52 (10-09-23 @ 13:07)  RR: 18 (10-09-23 @ 12:30)  SpO2: 92% (10-09-23 @ 12:30)  Wt(kg): --    PHYSICAL EXAM:  General: on hd, poorly responsive  no acute distress  Eyes:  anicteric, no conjunctival injection, no discharge  Oropharynx: no lesions or injection 	  Neck: supple, without adenopathy, shiley   Lungs: clear to auscultation  Heart: regular rate and rhythm; no murmur, rubs or gallops  Abdomen: soft, nondistended, nontender, without mass or organomegaly  Skin: no lesions  Extremities: no clubbing, cyanosis, or edema  Neurologic: poorly responsive     LAB RESULTS:                        7.8    18.06 )-----------( 111      ( 09 Oct 2023 07:00 )             22.2     10-    134<L>  |  98  |  82<H>  ----------------------------<  96  4.1   |  27  |  5.27<H>    Ca    10.1      09 Oct 2023 07:00  Phos  3.2     10-  Mg     1.9     10-    TPro  6.0  /  Alb  2.1<L>  /  TBili  0.8  /  DBili  x   /  AST  25  /  ALT  27  /  AlkPhos  243<H>  10    LIVER FUNCTIONS - ( 09 Oct 2023 07:00 )  Alb: 2.1 g/dL / Pro: 6.0 g/dL / ALK PHOS: 243 U/L / ALT: 27 U/L / AST: 25 U/L / GGT: x           Urinalysis Basic - ( 09 Oct 2023 07:00 )    Color: x / Appearance: x / SG: x / pH: x  Gluc: 96 mg/dL / Ketone: x  / Bili: x / Urobili: x   Blood: x / Protein: x / Nitrite: x   Leuk Esterase: x / RBC: x / WBC x   Sq Epi: x / Non Sq Epi: x / Bacteria: x      MICROBIOLOGY:  RECENT CULTURES:  10-06 @ 10:20 .Blood Blood-Peripheral     Growth in aerobic and anaerobic bottles: Methicillin Resistant  Staphylococcus aureus  See previous culture [id number]    Growth in aerobic bottle: Gram Positive Cocci in Clusters  Growth in anaerobic bottle: Gram Positive Cocci in Clusters        RADIOLOGY REVIEWED:  < from: Xray Chest 1 View- PORTABLE-Routine (Xray Chest 1 View- PORTABLE-Routine in AM.) (10.06.23 @ 08:23) >    ACC: [id number] EXAM:  XR CHEST PORTABLE ROUTINE 1V   ORDERED BY: JULIA CRUZ     PROCEDURE DATE:  10/06/2023          INTERPRETATION:  CLINICAL INDICATION: 78 years  Male with eval pna.    COMPARISON: 10/5/2023    Right central line tip overlies the superior vena cava. Enteric catheter   tip is below the diaphragm.    AP view of the chest demonstrates persistent moderate right pleural   effusion and no left pleural effusion. The pulmonary vasculature is   normal.    The heart is enlarged. Sternotomy and cardiac valve prosthesis. A   leadless pacemaker and left atrial appendage clips are reidentified.    The mediastinum and robert cannot be assessed due to rotation.    Mild thoracic degenerative changes are present.    IMPRESSION:    No significant change.    Moderate right pleural effusion. Cannot exclude underlying infiltrate or   atelectasis.    Cardiomegaly.    Right central line and enteric catheter in satisfactory position.    --- End of Report ---            < end of copied text >      < from: TTE Echo Complete w/o Contrast w/ Doppler (10.01.23 @ 10:26) >    ACC: [id number] EXAM:  ECHO TTE WO CON COMP W DOPP                          PROCEDURE DATE:  10/01/2023          INTERPRETATION:  TRANSTHORACIC ECHOCARDIOGRAM REPORT        Patient Name:   JANEE ARCEO Patient Location: [address] Rec #:  [id number] Accession #:      [id number]  Account #:      [id number]   Height:           67.7 in 172.0 cm  YOB: 1945  Weight:           158.7 lb 72.00 kg  Patient Age:    78 years   BSA:              1.85 m²  Patient Gender: M          BP:    97/42 mmHg      Date of Exam:        10/1/2023 10:26:05 AM  Sonographer:         RON  Referring Physician: NANCY    Procedure:     2D Echo/Doppler/Color Doppler Complete.  Indications:   I33.0 - Acute and subacute infective endocarditis  Diagnosis:     I33.0 - Acute and subacute infective endocarditis  Study Details: Technically adequate study. Study quality was adversely   affected                 due to Apical view is limited.        2D AND M-MODE MEASUREMENTS (normal ranges within parentheses):  Left Ventricle:                  Normal   Aorta/Left Atrium:               Normal  IVSd (2D):              1.01 cm (0.7-1.1) Aortic Root (Mmode): 3.93 cm   (2.4-3.7)  LVPWd (2D):             1.19 cm (0.7-1.1) AoV Cusp Separation: 1.70 cm   (1.5-2.6)  LVIDd (2D):             4.68 cm (3.4-5.7) Left Atrium (Mmode): 4.04 cm   (1.9-4.0)  LVIDs (2D):             3.76 cm  LV FS (2D):             19.5 %   (>25%)  LV EF (2D):              40 %    (>55%)  Relative Wall Thickness  0.51    (<0.42)    LV DIASTOLIC FUNCTION:  MV Peak E: 1.15 m/s Decel Time: 424 msec  MV Peak A: 0.12 m/s  E/A Ratio: 9.44    SPECTRAL DOPPLER ANALYSIS (where applicable):  Mitral Valve:  MV P1/2 Time: 123.00 msec  MV Area, PHT: 1.79 cm²    Aortic Valve: AoV Max Inder: 1.45 m/s AoV Peak P.4 mmHg AoV Mean P.5 mmHg    LVOT Vmax: 0.42 m/s LVOT VTI: 0.084 m LVOT Diameter: 2.10 cm    AoV Area, Vmax: 1.00 cm² AoV Area, VTI: 1.37 cm² AoV Area, Vmn: 1.30 cm²  Ao VTI: 0.212  Tricuspid Valve and PA/RV Systolic Pressure: TR Max Velocity: 2.33 m/s RA   Pressure: 10 mmHg RVSP/PASP: 31.6 mmHg      PHYSICIAN INTERPRETATION:  Left Ventricle: The left ventricular internal cavity size is normal.  Global LV systolic function was normal. Left ventricular ejection   fraction, by visual estimation, is 45 to 50%. Abnormal (paradoxical)   septal motion consistent with post-operative status. The left ventricular   diastolic function could not be assessed in this study.  Right Ventricle: The right ventricle was not well visualized.  Left Atrium: Severely enlarged left atrium.  Right Atrium: Moderately enlarged right atrium.  Pericardium: Trivial pericardial effusion is present. The pericardial   effusion is globally located around the entire heart.  Mitral Valve: There ismild mitral annular calcification. MV Prosthetic   Valve.  Tricuspid Valve: The tricuspid valve is normal in structure.   Mild-moderate tricuspid regurgitation is visualized.  Aortic Valve: The aortic valve is trileaflet. Mild aortic stenosis is   present. Trivial aortic valve regurgitation is seen.  Pulmonic Valve: Structurally normal pulmonic valve, with normal leaflet   excursion. The pulmonic valve is normal. Mild pulmonic valve   regurgitation.  Aorta: There is dilatation of the aortic root.      Summary:   1. 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 Biatrial enlargement   4. Left ventricular ejection fraction, by visual estimation, is 45 to   50%.   5. Mild mitral annular calcification.   6. Mild-moderate tricuspid regurgitation.   7. Mild aortic valve stenosis.    [id number] Pilo Lozano , Electronically signed on 10/1/2023   at 11:45:16 AM    < end of copied text >          Assessment:  Patient with mvr, ppm s/p removal for mssa infection, micra ppm, esrd on dialysis via catheter, clostridium bacteremia in August from proctitis, came in with high grade sustained mrsa bacteremia. He had permacath pulled, latest bc form 10/6 still positive, new ones just sent today. He is now on ceftaroline and vanco. He is not a surgical candidate for valve surgery so talya being deferred. this could be a terminal infection.   Plan:  continue antibiotics with vanco and ceftaroline  f/u latest blood cx  doppler arms to see if clots  goc discussions

## 2023-10-09 NOTE — PROGRESS NOTE ADULT - SUBJECTIVE AND OBJECTIVE BOX
JANEE ARCEO, 78y Male  MRN: [id number]  ATTENDING: Martin Almaguer    HPI:    MEDICATIONS:  acetaminophen     Tablet .. 650 milliGRAM(s) Oral every 6 hours PRN  aluminum hydroxide/magnesium hydroxide/simethicone Suspension 10 milliLiter(s) Oral every 6 hours PRN  aspirin  chewable 81 milliGRAM(s) Oral daily  ceftaroline fosamil IVPB 200 milliGRAM(s) IV Intermittent once  ceftaroline fosamil IVPB      ceftaroline fosamil IVPB 200 milliGRAM(s) IV Intermittent every 8 hours  chlorhexidine 2% Cloths 1 Application(s) Topical <User Schedule>  epoetin val (PROCRIT) Injectable 84416 Unit(s) IV Push <User Schedule>  folic acid 1 milliGRAM(s) Oral daily  heparin   Injectable 5000 Unit(s) SubCutaneous every 12 hours  hydrocortisone hemorrhoidal Suppository 1 Suppository(s) Rectal two times a day  influenza  Vaccine (HIGH DOSE) 0.7 milliLiter(s) IntraMuscular once  levothyroxine 137 MICROGram(s) Oral daily  mesalamine Suppository 1000 milliGRAM(s) Rectal at bedtime  midodrine 10 milliGRAM(s) Oral every 8 hours  pantoprazole   Suspension 40 milliGRAM(s) Enteral Tube daily  polyethylene glycol 3350 17 Gram(s) Oral daily  senna 2 Tablet(s) Oral at bedtime  sodium chloride 0.9% lock flush 10 milliLiter(s) IV Push every 1 hour PRN  vancomycin  IVPB 750 milliGRAM(s) IV Intermittent <User Schedule>    All other medications reviewed.    SUBJECTIVE:    VITALS:  T(C): 36.6 (10-09-23 @ 12:30), Max: 37.7 (10-08-23 @ 21:19)  T(F): 97.9 (10-09-23 @ 12:30), Max: 99.9 (10-08-23 @ 21:19)  HR: 93 (10-09-23 @ 13:07) (74 - 122)  BP: 89/52 (10-09-23 @ 13:07) (89/52 - 127/66)      PHYSICAL EXAM:  Constitutional: alert, well developed  HEENT: normocephalic, anicteric sclerae, no mucositis or thrush  Respiratory: bilateral clear to auscultation anteriorly  Cardiovascular : S1, S2 regular, rhythmic, no murmurs, gallops or rubs  Abdomen: soft, distended, + normoactive BS, no palpable HS- megaly  Extremities: no tenderness;  -c/c/e, pulses equal bilaterally    LABS:  (10-09) WBC: 18.06 K/uL,Hemoglobin: 7.8 g/dL, Hematocrit: 22.2 %,    Platelet: 111 K/uL    (10-09) Na: 134 mmol/L ; K: 4.1 mmol/L ; BUN: 82 mg/dL ; Cr: 5.27 mg/dL.        RADIOLOGY:     JANEE ARCEO, 78y Male  MRN: [id number]  ATTENDING: Martin Almaguer    HPI:    MEDICATIONS:  acetaminophen     Tablet .. 650 milliGRAM(s) Oral every 6 hours PRN  aluminum hydroxide/magnesium hydroxide/simethicone Suspension 10 milliLiter(s) Oral every 6 hours PRN  aspirin  chewable 81 milliGRAM(s) Oral daily  ceftaroline fosamil IVPB 200 milliGRAM(s) IV Intermittent once  ceftaroline fosamil IVPB      ceftaroline fosamil IVPB 200 milliGRAM(s) IV Intermittent every 8 hours  chlorhexidine 2% Cloths 1 Application(s) Topical <User Schedule>  epoetin val (PROCRIT) Injectable 74479 Unit(s) IV Push <User Schedule>  folic acid 1 milliGRAM(s) Oral daily  heparin   Injectable 5000 Unit(s) SubCutaneous every 12 hours  hydrocortisone hemorrhoidal Suppository 1 Suppository(s) Rectal two times a day  influenza  Vaccine (HIGH DOSE) 0.7 milliLiter(s) IntraMuscular once  levothyroxine 137 MICROGram(s) Oral daily  mesalamine Suppository 1000 milliGRAM(s) Rectal at bedtime  midodrine 10 milliGRAM(s) Oral every 8 hours  pantoprazole   Suspension 40 milliGRAM(s) Enteral Tube daily  polyethylene glycol 3350 17 Gram(s) Oral daily  senna 2 Tablet(s) Oral at bedtime  sodium chloride 0.9% lock flush 10 milliLiter(s) IV Push every 1 hour PRN  vancomycin  IVPB 750 milliGRAM(s) IV Intermittent <User Schedule>    All other medications reviewed.    SUBJECTIVE:    VITALS:  T(C): 36.6 (10-09-23 @ 12:30), Max: 37.7 (10-08-23 @ 21:19)  T(F): 97.9 (10-09-23 @ 12:30), Max: 99.9 (10-08-23 @ 21:19)  HR: 93 (10-09-23 @ 13:07) (74 - 122)  BP: 89/52 (10-09-23 @ 13:07) (89/52 - 127/66)      PHYSICAL EXAM:  Constitutional: alert, well developed  HEENT: normocephalic, anicteric sclerae, no mucositis or thrush  Respiratory: bilateral clear to auscultation anteriorly  Cardiovascular : S1, S2 regular, rhythmic, no murmurs, gallops or rubs  Abdomen: soft, distended, + normoactive BS, no palpable HS- megaly  Extremities: no tenderness;  -c/c/e, pulses equal bilaterally    LABS:  (10-09) WBC: 18.06 K/uL,Hemoglobin: 7.8 g/dL, Hematocrit: 22.2 %,    Platelet: 111 K/uL    (10-09) Na: 134 mmol/L ; K: 4.1 mmol/L ; BUN: 82 mg/dL ; Cr: 5.27 mg/dL.        RADIOLOGY:     JANEE ARCEO, 78y Male  MRN: [id number]  ATTENDING: Martin Almaguer    HPI:    MEDICATIONS:  acetaminophen     Tablet .. 650 milliGRAM(s) Oral every 6 hours PRN  aluminum hydroxide/magnesium hydroxide/simethicone Suspension 10 milliLiter(s) Oral every 6 hours PRN  aspirin  chewable 81 milliGRAM(s) Oral daily  ceftaroline fosamil IVPB 200 milliGRAM(s) IV Intermittent once  ceftaroline fosamil IVPB      ceftaroline fosamil IVPB 200 milliGRAM(s) IV Intermittent every 8 hours  chlorhexidine 2% Cloths 1 Application(s) Topical <User Schedule>  epoetin val (PROCRIT) Injectable 29581 Unit(s) IV Push <User Schedule>  folic acid 1 milliGRAM(s) Oral daily  heparin   Injectable 5000 Unit(s) SubCutaneous every 12 hours  hydrocortisone hemorrhoidal Suppository 1 Suppository(s) Rectal two times a day  influenza  Vaccine (HIGH DOSE) 0.7 milliLiter(s) IntraMuscular once  levothyroxine 137 MICROGram(s) Oral daily  mesalamine Suppository 1000 milliGRAM(s) Rectal at bedtime  midodrine 10 milliGRAM(s) Oral every 8 hours  pantoprazole   Suspension 40 milliGRAM(s) Enteral Tube daily  polyethylene glycol 3350 17 Gram(s) Oral daily  senna 2 Tablet(s) Oral at bedtime  sodium chloride 0.9% lock flush 10 milliLiter(s) IV Push every 1 hour PRN  vancomycin  IVPB 750 milliGRAM(s) IV Intermittent <User Schedule>    All other medications reviewed.    SUBJECTIVE:    VITALS:  T(C): 36.6 (10-09-23 @ 12:30), Max: 37.7 (10-08-23 @ 21:19)  T(F): 97.9 (10-09-23 @ 12:30), Max: 99.9 (10-08-23 @ 21:19)  HR: 93 (10-09-23 @ 13:07) (74 - 122)  BP: 89/52 (10-09-23 @ 13:07) (89/52 - 127/66)      PHYSICAL EXAM:  Constitutional: alert, well developed  HEENT: normocephalic, anicteric sclerae, no mucositis or thrush  Respiratory: bilateral clear to auscultation anteriorly  Cardiovascular : S1, S2 regular, rhythmic, no murmurs, gallops or rubs  Abdomen: soft, distended, + normoactive BS, no palpable HS- megaly  Extremities: no tenderness;  -c/c/e, pulses equal bilaterally    LABS:  (10-09) WBC: 18.06 K/uL,Hemoglobin: 7.8 g/dL, Hematocrit: 22.2 %,    Platelet: 111 K/uL    (10-09) Na: 134 mmol/L ; K: 4.1 mmol/L ; BUN: 82 mg/dL ; Cr: 5.27 mg/dL.        RADIOLOGY:     JANEE ARCEO, 78y Male  MRN: [id number]  ATTENDING: Martin Almaguer    HPI:  78M, PMHx MVR, PPM, s/p removal for MSSA infection, ESRD, on dialysis, Clostridium bacteremia in August (proctitis source) admitted with fever (102.2) found with high-grade sustained MRSA bacteremia.  On 10/6/2023 patient had permacath removed.  Brought spectrum antibiotic coverage.  Not a candidate for surgical valve removal.  Patient is poor historian.  Hematology consulted for anemia and thrombocytopenia.    MEDICATIONS:  acetaminophen     Tablet .. 650 milliGRAM(s) Oral every 6 hours PRN  aluminum hydroxide/magnesium hydroxide/simethicone Suspension 10 milliLiter(s) Oral every 6 hours PRN  aspirin  chewable 81 milliGRAM(s) Oral daily  ceftaroline fosamil IVPB 200 milliGRAM(s) IV Intermittent once  ceftaroline fosamil IVPB      ceftaroline fosamil IVPB 200 milliGRAM(s) IV Intermittent every 8 hours  chlorhexidine 2% Cloths 1 Application(s) Topical <User Schedule>  epoetin val (PROCRIT) Injectable 25390 Unit(s) IV Push <User Schedule>  folic acid 1 milliGRAM(s) Oral daily  heparin   Injectable 5000 Unit(s) SubCutaneous every 12 hours  hydrocortisone hemorrhoidal Suppository 1 Suppository(s) Rectal two times a day  influenza  Vaccine (HIGH DOSE) 0.7 milliLiter(s) IntraMuscular once  levothyroxine 137 MICROGram(s) Oral daily  mesalamine Suppository 1000 milliGRAM(s) Rectal at bedtime  midodrine 10 milliGRAM(s) Oral every 8 hours  pantoprazole   Suspension 40 milliGRAM(s) Enteral Tube daily  polyethylene glycol 3350 17 Gram(s) Oral daily  senna 2 Tablet(s) Oral at bedtime  sodium chloride 0.9% lock flush 10 milliLiter(s) IV Push every 1 hour PRN  vancomycin  IVPB 750 milliGRAM(s) IV Intermittent <User Schedule>    All other medications reviewed.    SUBJECTIVE:  No events over night.    VITALS:  T(C): 36.6 (10-09-23 @ 12:30), Max: 37.7 (10-08-23 @ 21:19)  T(F): 97.9 (10-09-23 @ 12:30), Max: 99.9 (10-08-23 @ 21:19)  HR: 93 (10-09-23 @ 13:07) (74 - 122)  BP: 89/52 (10-09-23 @ 13:07) (89/52 - 127/66)      PHYSICAL EXAM:  Constitutional: alert, well developed  HEENT: normocephalic, anicteric sclerae, no mucositis or thrush  Respiratory: bilateral clear to auscultation anteriorly  Cardiovascular : S1, S2 regular, rhythmic, no murmurs, gallops or rubs  Abdomen: soft, distended, + normoactive BS, no palpable HS- megaly  Extremities: no tenderness;  -c/c/e, pulses equal bilaterally    LABS:  (10-09) WBC: 18.06 K/uL,Hemoglobin: 7.8 g/dL, Hematocrit: 22.2 %,  Platelet: 111 K/uL  (10-09) Na: 134 mmol/L ; K: 4.1 mmol/L ; BUN: 82 mg/dL ; Cr: 5.27 mg/dL.    RADIOLOGY:  ACC: [id number] EXAM:  CT BRAIN   ORDERED BY: JULIA CRUZ     PROCEDURE DATE:  10/04/2023          INTERPRETATION:  CLINICAL INFORMATION: Altered mental status.    TECHNIQUE:  Noncontrast axial CT images were acquired through the head.  Sagittal and coronal reformats were performed.    COMPARISON STUDY: None    FINDINGS:  There is no CT evidence of acute intracranial hemorrhage, mass effect or   midline shift.  There is no acute loss of gray matter-white matter   differentiation.    Slight prominence of the ventricles is compatible with mild age-related   involutional changes.  No clinically significant chronic microvascular   ischemic disease or other white matter pathology is visualized by CT   technique.      The paranasal sinuses and mastoids are grossly clear.    The patient is status post intraocular lens replacement bilaterally..    The calvarium and skull base appear within normal limits.    IMPRESSION:  No CT evidence of acute intracranial pathology.     JANEE ARCEO, 78y Male  MRN: [id number]  ATTENDING: Martin Almaguer    HPI:  78M, PMHx MVR, PPM, s/p removal for MSSA infection, ESRD, on dialysis, Clostridium bacteremia in August (proctitis source) admitted with fever (102.2) found with high-grade sustained MRSA bacteremia.  On 10/6/2023 patient had permacath removed.  Brought spectrum antibiotic coverage.  Not a candidate for surgical valve removal.  Patient is poor historian.  Hematology consulted for anemia and thrombocytopenia.    MEDICATIONS:  acetaminophen     Tablet .. 650 milliGRAM(s) Oral every 6 hours PRN  aluminum hydroxide/magnesium hydroxide/simethicone Suspension 10 milliLiter(s) Oral every 6 hours PRN  aspirin  chewable 81 milliGRAM(s) Oral daily  ceftaroline fosamil IVPB 200 milliGRAM(s) IV Intermittent once  ceftaroline fosamil IVPB      ceftaroline fosamil IVPB 200 milliGRAM(s) IV Intermittent every 8 hours  chlorhexidine 2% Cloths 1 Application(s) Topical <User Schedule>  epoetin val (PROCRIT) Injectable 70825 Unit(s) IV Push <User Schedule>  folic acid 1 milliGRAM(s) Oral daily  heparin   Injectable 5000 Unit(s) SubCutaneous every 12 hours  hydrocortisone hemorrhoidal Suppository 1 Suppository(s) Rectal two times a day  influenza  Vaccine (HIGH DOSE) 0.7 milliLiter(s) IntraMuscular once  levothyroxine 137 MICROGram(s) Oral daily  mesalamine Suppository 1000 milliGRAM(s) Rectal at bedtime  midodrine 10 milliGRAM(s) Oral every 8 hours  pantoprazole   Suspension 40 milliGRAM(s) Enteral Tube daily  polyethylene glycol 3350 17 Gram(s) Oral daily  senna 2 Tablet(s) Oral at bedtime  sodium chloride 0.9% lock flush 10 milliLiter(s) IV Push every 1 hour PRN  vancomycin  IVPB 750 milliGRAM(s) IV Intermittent <User Schedule>    All other medications reviewed.    SUBJECTIVE:  No events over night.    VITALS:  T(C): 36.6 (10-09-23 @ 12:30), Max: 37.7 (10-08-23 @ 21:19)  T(F): 97.9 (10-09-23 @ 12:30), Max: 99.9 (10-08-23 @ 21:19)  HR: 93 (10-09-23 @ 13:07) (74 - 122)  BP: 89/52 (10-09-23 @ 13:07) (89/52 - 127/66)      PHYSICAL EXAM:  Constitutional: alert, well developed  HEENT: normocephalic, anicteric sclerae, no mucositis or thrush  Respiratory: bilateral clear to auscultation anteriorly  Cardiovascular : S1, S2 regular, rhythmic, no murmurs, gallops or rubs  Abdomen: soft, distended, + normoactive BS, no palpable HS- megaly  Extremities: no tenderness;  -c/c/e, pulses equal bilaterally    LABS:  (10-09) WBC: 18.06 K/uL,Hemoglobin: 7.8 g/dL, Hematocrit: 22.2 %,  Platelet: 111 K/uL  (10-09) Na: 134 mmol/L ; K: 4.1 mmol/L ; BUN: 82 mg/dL ; Cr: 5.27 mg/dL.    RADIOLOGY:  ACC: [id number] EXAM:  CT BRAIN   ORDERED BY: JULIA CRUZ     PROCEDURE DATE:  10/04/2023          INTERPRETATION:  CLINICAL INFORMATION: Altered mental status.    TECHNIQUE:  Noncontrast axial CT images were acquired through the head.  Sagittal and coronal reformats were performed.    COMPARISON STUDY: None    FINDINGS:  There is no CT evidence of acute intracranial hemorrhage, mass effect or   midline shift.  There is no acute loss of gray matter-white matter   differentiation.    Slight prominence of the ventricles is compatible with mild age-related   involutional changes.  No clinically significant chronic microvascular   ischemic disease or other white matter pathology is visualized by CT   technique.      The paranasal sinuses and mastoids are grossly clear.    The patient is status post intraocular lens replacement bilaterally..    The calvarium and skull base appear within normal limits.    IMPRESSION:  No CT evidence of acute intracranial pathology.     JANEE ARCEO, 78y Male  MRN: [id number]  ATTENDING: Martin Almaguer    HPI:  78M, PMHx MVR, PPM, s/p removal for MSSA infection, ESRD, on dialysis, Clostridium bacteremia in August (proctitis source) admitted with fever (102.2) found with high-grade sustained MRSA bacteremia.  On 10/6/2023 patient had permacath removed.  Brought spectrum antibiotic coverage.  Not a candidate for surgical valve removal.  Patient is poor historian.  Hematology consulted for anemia and thrombocytopenia.    MEDICATIONS:  acetaminophen     Tablet .. 650 milliGRAM(s) Oral every 6 hours PRN  aluminum hydroxide/magnesium hydroxide/simethicone Suspension 10 milliLiter(s) Oral every 6 hours PRN  aspirin  chewable 81 milliGRAM(s) Oral daily  ceftaroline fosamil IVPB 200 milliGRAM(s) IV Intermittent once  ceftaroline fosamil IVPB      ceftaroline fosamil IVPB 200 milliGRAM(s) IV Intermittent every 8 hours  chlorhexidine 2% Cloths 1 Application(s) Topical <User Schedule>  epoetin val (PROCRIT) Injectable 98439 Unit(s) IV Push <User Schedule>  folic acid 1 milliGRAM(s) Oral daily  heparin   Injectable 5000 Unit(s) SubCutaneous every 12 hours  hydrocortisone hemorrhoidal Suppository 1 Suppository(s) Rectal two times a day  influenza  Vaccine (HIGH DOSE) 0.7 milliLiter(s) IntraMuscular once  levothyroxine 137 MICROGram(s) Oral daily  mesalamine Suppository 1000 milliGRAM(s) Rectal at bedtime  midodrine 10 milliGRAM(s) Oral every 8 hours  pantoprazole   Suspension 40 milliGRAM(s) Enteral Tube daily  polyethylene glycol 3350 17 Gram(s) Oral daily  senna 2 Tablet(s) Oral at bedtime  sodium chloride 0.9% lock flush 10 milliLiter(s) IV Push every 1 hour PRN  vancomycin  IVPB 750 milliGRAM(s) IV Intermittent <User Schedule>    All other medications reviewed.    SUBJECTIVE:  No events over night.    VITALS:  T(C): 36.6 (10-09-23 @ 12:30), Max: 37.7 (10-08-23 @ 21:19)  T(F): 97.9 (10-09-23 @ 12:30), Max: 99.9 (10-08-23 @ 21:19)  HR: 93 (10-09-23 @ 13:07) (74 - 122)  BP: 89/52 (10-09-23 @ 13:07) (89/52 - 127/66)      PHYSICAL EXAM:  Constitutional: alert, well developed  HEENT: normocephalic, anicteric sclerae, no mucositis or thrush  Respiratory: bilateral clear to auscultation anteriorly  Cardiovascular : S1, S2 regular, rhythmic, no murmurs, gallops or rubs  Abdomen: soft, distended, + normoactive BS, no palpable HS- megaly  Extremities: no tenderness;  -c/c/e, pulses equal bilaterally    LABS:  (10-09) WBC: 18.06 K/uL,Hemoglobin: 7.8 g/dL, Hematocrit: 22.2 %,  Platelet: 111 K/uL  (10-09) Na: 134 mmol/L ; K: 4.1 mmol/L ; BUN: 82 mg/dL ; Cr: 5.27 mg/dL.    RADIOLOGY:  ACC: [id number] EXAM:  CT BRAIN   ORDERED BY: JULIA CRUZ     PROCEDURE DATE:  10/04/2023          INTERPRETATION:  CLINICAL INFORMATION: Altered mental status.    TECHNIQUE:  Noncontrast axial CT images were acquired through the head.  Sagittal and coronal reformats were performed.    COMPARISON STUDY: None    FINDINGS:  There is no CT evidence of acute intracranial hemorrhage, mass effect or   midline shift.  There is no acute loss of gray matter-white matter   differentiation.    Slight prominence of the ventricles is compatible with mild age-related   involutional changes.  No clinically significant chronic microvascular   ischemic disease or other white matter pathology is visualized by CT   technique.      The paranasal sinuses and mastoids are grossly clear.    The patient is status post intraocular lens replacement bilaterally..    The calvarium and skull base appear within normal limits.    IMPRESSION:  No CT evidence of acute intracranial pathology.

## 2023-10-09 NOTE — PROGRESS NOTE ADULT - SUBJECTIVE AND OBJECTIVE BOX
Follow-up Critical Care Progress Note  Chief Complaint : Pneumonia due to infectious organism    Patient seen and examined  more alert  states wants to go home  but is confused    Allergies :levofloxacin (Unknown)  alfuzosin (Unknown)  tamsulosin (Unknown)  Myrbetriq (Unknown)      PAST MEDICAL & SURGICAL HISTORY:  Chronic atrial fibrillation    History of BPH    CAD (coronary artery disease)    Coronary stent patent    ESRD on dialysis    History of GI bleed    Mitral valve replaced        Medications:  MEDICATIONS  (STANDING):  aspirin  chewable 81 milliGRAM(s) Oral daily  ceftaroline fosamil IVPB 200 milliGRAM(s) IV Intermittent every 8 hours  ceftaroline fosamil IVPB 200 milliGRAM(s) IV Intermittent once  ceftaroline fosamil IVPB      chlorhexidine 2% Cloths 1 Application(s) Topical <User Schedule>  epoetin val (PROCRIT) Injectable 68576 Unit(s) IV Push <User Schedule>  folic acid 1 milliGRAM(s) Oral daily  heparin   Injectable 5000 Unit(s) SubCutaneous every 12 hours  hydrocortisone hemorrhoidal Suppository 1 Suppository(s) Rectal two times a day  influenza  Vaccine (HIGH DOSE) 0.7 milliLiter(s) IntraMuscular once  levothyroxine 150 MICROGram(s) Oral daily  mesalamine Suppository 1000 milliGRAM(s) Rectal at bedtime  midodrine 10 milliGRAM(s) Oral every 8 hours  pantoprazole   Suspension 40 milliGRAM(s) Enteral Tube daily  polyethylene glycol 3350 17 Gram(s) Oral daily  senna 2 Tablet(s) Oral at bedtime  vancomycin  IVPB 750 milliGRAM(s) IV Intermittent <User Schedule>    MEDICATIONS  (PRN):  acetaminophen     Tablet .. 650 milliGRAM(s) Oral every 6 hours PRN Mild Pain (1 - 3)  aluminum hydroxide/magnesium hydroxide/simethicone Suspension 10 milliLiter(s) Oral every 6 hours PRN dyspepsia  sodium chloride 0.9% lock flush 10 milliLiter(s) IV Push every 1 hour PRN Pre/post blood products, medications, blood draw, and to maintain line patency      Antibiotics History  caspofungin IVPB    , 10-01-23 @ 08:49  caspofungin IVPB 50 milliGRAM(s) IV Intermittent every 24 hours, 10-02-23 @ 08:48  caspofungin IVPB 70 milliGRAM(s) IV Intermittent once, 10-01-23 @ 08:48  cefepime   IVPB 1000 milliGRAM(s) IV Intermittent once, 09-30-23 @ 19:12, Stop order after: 1 Doses  ceftaroline fosamil IVPB 200 milliGRAM(s) IV Intermittent every 12 hours, 10-08-23 @ 18:00  ceftaroline fosamil IVPB    , 10-08-23 @ 10:32  ceftaroline fosamil IVPB 200 milliGRAM(s) IV Intermittent once, 10-09-23 @ 09:43  ceftaroline fosamil IVPB    , 10-09-23 @ 09:43  ceftaroline fosamil IVPB 200 milliGRAM(s) IV Intermittent once, 10-08-23 @ 08:35  ceftaroline fosamil IVPB 200 milliGRAM(s) IV Intermittent every 8 hours, 10-09-23 @ 14:00  DAPTOmycin IVPB 720 milliGRAM(s) IV Intermittent every 48 hours, 10-09-23 @ 14:10, Stop order after: 42 Days  meropenem  IVPB 500 milliGRAM(s) IV Intermittent once, 10-01-23 @ 09:02, Stop order after: 1 Doses  meropenem  IVPB 500 milliGRAM(s) IV Intermittent every 24 hours, 10-02-23 @ 09:02, Stop order after: 8 Days  meropenem  IVPB    , 10-01-23 @ 09:03  piperacillin/tazobactam IVPB.. 3.375 Gram(s) IV Intermittent every 12 hours, 09-30-23 @ 22:40, Stop order after: 7 Days  vancomycin  IVPB    , 10-01-23 @ 09:00  vancomycin  IVPB 1000 milliGRAM(s) IV Intermittent once, 10-04-23 @ 08:29, Stop order after: 1 Doses  vancomycin  IVPB 1000 milliGRAM(s) IV Intermittent every 24 hours, 10-03-23 @ 00:00  vancomycin  IVPB 1000 milliGRAM(s) IV Intermittent once, 10-01-23 @ 09:00, Stop order after: 1 Doses  vancomycin  IVPB 1000 milliGRAM(s) IV Intermittent every 24 hours, 10-02-23 @ 09:00, Stop order after: 1 Doses  vancomycin  IVPB 750 milliGRAM(s) IV Intermittent once, 10-07-23 @ 08:15, Stop order after: 1 Doses  vancomycin  IVPB 750 milliGRAM(s) IV Intermittent <User Schedule>, 10-09-23 @ 00:00, Stop order after: 42 Days  vancomycin  IVPB. 1000 milliGRAM(s) IV Intermittent once, 09-30-23 @ 19:35, Stop order after: 1 Doses      Heme Medications   aspirin  chewable 81 milliGRAM(s) Oral daily, 10-01-23 @ 12:50  heparin   Injectable 5000 Unit(s) SubCutaneous every 12 hours, 10-07-23 @ 10:11      GI Medications  aluminum hydroxide/magnesium hydroxide/simethicone Suspension 10 milliLiter(s) Oral every 6 hours, 10-01-23 @ 13:00, Routine PRN  mesalamine Suppository 1000 milliGRAM(s) Rectal at bedtime, 10-01-23 @ 12:49, Routine  pantoprazole   Suspension 40 milliGRAM(s) Enteral Tube daily, 10-06-23 @ 09:28, Routine  polyethylene glycol 3350 17 Gram(s) Oral daily, 10-01-23 @ 12:36, Routine  senna 2 Tablet(s) Oral at bedtime, 10-01-23 @ 12:36, Routine      COVID  10-01-23 @ 15:45  COVID -   Not[facility]ec  09-30-23 @ 19:15  [facility]      COVID Biomarkers    10-02-23 @ 10:00 ESR --  ---  CRP --  ---  DDimer  --   ---   LDH --   ---   Ferritin 1029<H>    10-01-23 @ 09:40 ESR --  ---  CRP --  ---  DDimer  2066<H>   ---      ---   Ferritin --             WBC Trend  10-09-23 @ 07:00   -  18.06<H>  10-08-23 @ 06:30   -  19.97<H>  10-07-23 @ 06:00   -  15.48<H>    H/H Trend  10-09-23 @ 07:00   -   7.8<L>/ 22.2<L>  10-08-23 @ 06:30   -   7.9<L>/ 24.4<L>  10-07-23 @ 06:00   -   7.7<L>/ 23.3<L>  10-06-23 @ 05:00   -   8.5<L>/ 25.5<L>  10-05-23 @ 05:30   -   8.3<L>/ 23.7<L>  10-04-23 @ 15:25   -   9.0<L>/ 26.6<L>    Stool Occult Blood    Platelet Trend  10-09-23 @ 07:00   -  111<L>  10-08-23 @ 06:30   -  107<L>  10-07-23 @ 06:00   -  116<L>    Trend Sodium  10-09-23 @ 07:00   -  134<L>  10-08-23 @ 06:30   -  136  10-07-23 @ 06:00   -  139    Trend Potassium  10-09-23 @ 07:00   -  4.1  10-08-23 @ 06:30   -  3.4<L>  10-07-23 @ 06:00   -  3.5    Trend Bun/Cr  10-09-23 @ 07:00  BUN/CR -  82<H> / 5.27<H>  10-08-23 @ 06:30  BUN/CR -  66<H> / 4.80<H>  10-07-23 @ 06:00  BUN/CR -  52<H> / 3.93<H>    Lactic Acid Trend  10-01-23 @ 05:30   -   1.6  09-30-23 @ 19:15   -   1.1    ABG Trend    Trend AST/ALT/ALK Phos/Bili  10-09-23 @ 07:00   25/27/243<H>/0.8  10-08-23 @ 06:30   42<H>/27/250<H>/0.6  10-07-23 @ 06:00   28/26/260<H>/0.7  10-06-23 @ 05:00   31/32/331<H>/0.6  10-03-23 @ 15:51   86<H>/79<H>/397<H>/0.7  10-03-23 @ 06:32   98<H>/78<H>/369<H>/0.6       LABS:                        7.8    18.06 )-----------( 111      ( 09 Oct 2023 07:00 )             22.2     10-09    134<L>  |  98  |  82<H>  ----------------------------<  96  4.1   |  27  |  5.27<H>    Ca    10.1      09 Oct 2023 07:00  Phos  3.2     10-09  Mg     1.9     10-09    TPro  6.0  /  Alb  2.1<L>  /  TBili  0.8  /  DBili  x   /  AST  25  /  ALT  27  /  AlkPhos  243<H>  10-09            PT/INR - ( 08 Oct 2023 06:30 )   PT: 12.1 sec;   INR: 1.06 ratio         PTT - ( 08 Oct 2023 06:30 )  PTT:30.8 sec  Urinalysis Basic - ( 09 Oct 2023 07:00 )    Color: x / Appearance: x / SG: x / pH: x  Gluc: 96 mg/dL / Ketone: x  / Bili: x / Urobili: x   Blood: x / Protein: x / Nitrite: x   Leuk Esterase: x / RBC: x / WBC x   Sq Epi: x / Non Sq Epi: x / Bacteria: x       CULTURES: (if applicable)    Culture - Blood (collected 10-06-23 @ 10:20)  Source: .Blood Blood-Peripheral  Gram Stain (10-07-23 @ 04:46):    Growth in aerobic bottle: Gram Positive Cocci in Clusters    Growth in anaerobic bottle: Gram Positive Cocci in Clusters  Final Report (10-08-23 @ 08:18):    Growth in aerobic and anaerobic bottles: Methicillin Resistant    Staphylococcus aureus    See previous culture [id number]    Culture - Blood (collected 10-04-23 @ 06:00)  Source: .Blood Blood  Gram Stain (10-05-23 @ 07:40):    Growth in aerobic bottle: Gram Positive Cocci in Clusters    Growth in anaerobic bottle: Gram Positive Cocci in Clusters  Final Report (10-06-23 @ 14:49):    Growth in aerobic and anaerobic bottles: Methicillin Resistant    Staphylococcus aureus    See previous culture [id number]    Culture - Blood (collected 10-04-23 @ 06:00)  Source: .Blood Blood  Gram Stain (10-05-23 @ 08:18):    Growth in aerobic bottle: Gram Positive Cocci in Clusters    Growth in anaerobic bottle: Gram Positive Cocci in Clusters  Final Report (10-06-23 @ 14:47):    Growth in aerobic and anaerobic bottles: Methicillin Resistant    Staphylococcus aureus  Organism: Methicillin resistant Staphylococcus aureus (10-06-23 @ 14:47)  Organism: Methicillin resistant Staphylococcus aureus (10-06-23 @ 14:47)      Method Type: LEWIS      -  Ampicillin/Sulbactam: R <=8/4      -  Cefazolin: R >16      -  Clindamycin: R >4      -  Daptomycin: S 1      -  Erythromycin: R >4      -  Gentamicin: S <=1 Should not be used as monotherapy      -  Linezolid: S 2      -  Oxacillin: R >2      -  Penicillin: R 8      -  Rifampin: S <=1 Should not be used as monotherapy      -  Tetracycline: S 2      -  Trimethoprim/Sulfamethoxazole: S <=0.5/9.5      -  Vancomycin: S 2    Culture - Catheter (collected 10-02-23 @ 16:21)  Source: .Catheter + MRSA Bacterimia Permacath Removal Aerobic/Anaerobic Right  IJ Permacath TIP  Final Report (10-05-23 @ 14:50):    Greater than or equal to 15 Colonies Methicillin Resistant Staphylococcus    aureus  Organism: Methicillin resistant Staphylococcus aureus (10-05-23 @ 14:50)  Organism: Methicillin resistant Staphylococcus aureus (10-05-23 @ 14:50)      Method Type: LEWIS      -  Ampicillin/Sulbactam: R 16/8      -  Cefazolin: R >16      -  Clindamycin: R >4      -  Daptomycin: S 1      -  Erythromycin: R >4      -  Gentamicin: S <=1 Should not be used as monotherapy      -  Linezolid: S 4      -  Oxacillin: R >2      -  Penicillin: R >8      -  Rifampin: S <=1 Should not be used as monotherapy      -  Tetracycline: S 2      -  Trimethoprim/Sulfamethoxazole: S <=0.5/9.5      -  Vancomycin: S 2    Culture - Blood (collected 09-30-23 @ 19:15)  Source: .Blood Blood-Peripheral  Gram Stain (10-01-23 @ 11:24):    Growth in anaerobic bottle: Gram Positive Cocci in Clusters    Growth in aerobic bottle: Gram Positive Cocci in Clusters  Final Report (10-03-23 @ 07:40):    Growth in aerobic and anaerobic bottles: Methicillin Resistant    Staphylococcus aureus    See previous culture  [id number]    Culture - Blood (collected 09-30-23 @ 19:10)  Source: .Blood Blood-Peripheral  Gram Stain (10-01-23 @ 11:02):    Growth in anaerobic bottle: Gram Positive Cocci in Clusters    Growth in aerobic bottle: Gram Positive Cocci in Clusters  Final Report (10-03-23 @ 07:39):    Growth in aerobic and anaerobic bottles: Methicillin Resistant    Staphylococcus aureus    Direct identification is available within approximately 3-5    hours either by Blood Panel Multiplexed PCR or Direct    MALDI-TOF. Details: [email or web address]  Organism: Blood Culture PCR  Methicillin resistant Staphylococcus aureus (10-03-23 @ 07:39)  Organism: Methicillin resistant Staphylococcus aureus (10-03-23 @ 07:39)      Method Type: LEWIS      -  Ampicillin/Sulbactam: R 16/8      -  Cefazolin: R >16      -  Clindamycin: R >4      -  Daptomycin: S 1      -  Erythromycin: R >4      -  Gentamicin: S <=1 Should not be used as monotherapy      -  Linezolid: S 4      -  Oxacillin: R >2      -  Penicillin: R >8      -  Rifampin: S <=1 Should not be used as monotherapy      -  Tetracycline: S 2      -  Trimethoprim/Sulfamethoxazole: S <=0.5/9.5      -  Vancomycin: S 2  Organism: Blood Culture PCR (10-03-23 @ 07:39)      Method Type: PCR      -  Methicillin resistant Staphylococcus aureus (MRSA): Detec      Rapid RVP Result: NotDetec (10-01-23 @ 15:45)     VITALS:  T(C): 36.6 (10-09-23 @ 12:30), Max: 37.7 (10-08-23 @ 21:19)  T(F): 97.9 (10-09-23 @ 12:30), Max: 99.9 (10-08-23 @ 21:19)  HR: 93 (10-09-23 @ 13:07) (74 - 122)  BP: 89/52 (10-09-23 @ 13:07) (89/52 - 127/66)  BP(mean): 64 (10-09-23 @ 13:07) (64 - 80)  ABP: --  ABP(mean): --  RR: 18 (10-09-23 @ 12:30) (18 - 19)  SpO2: 92% (10-09-23 @ 12:30) (92% - 98%)  CVP(mm Hg): --  CVP(cm H2O): --    Ins and Outs     10-08-23 @ 07:01  -  10-09-23 @ 07:00  --------------------------------------------------------  IN: 310 mL / OUT: 0 mL / NET: 310 mL                I&O's Detail    08 Oct 2023 07:01  -  09 Oct 2023 07:00  --------------------------------------------------------  IN:    IV PiggyBack: 50 mL    Nepro: 260 mL  Total IN: 310 mL    OUT:    Voided (mL): 0 mL  Total OUT: 0 mL    Total NET: 310 mL      Follow-up Critical Care Progress Note  Chief Complaint : Pneumonia due to infectious organism    Patient seen and examined  more alert  states wants to go home  but is confused    Allergies :levofloxacin (Unknown)  alfuzosin (Unknown)  tamsulosin (Unknown)  Myrbetriq (Unknown)      PAST MEDICAL & SURGICAL HISTORY:  Chronic atrial fibrillation    History of BPH    CAD (coronary artery disease)    Coronary stent patent    ESRD on dialysis    History of GI bleed    Mitral valve replaced        Medications:  MEDICATIONS  (STANDING):  aspirin  chewable 81 milliGRAM(s) Oral daily  ceftaroline fosamil IVPB 200 milliGRAM(s) IV Intermittent every 8 hours  ceftaroline fosamil IVPB 200 milliGRAM(s) IV Intermittent once  ceftaroline fosamil IVPB      chlorhexidine 2% Cloths 1 Application(s) Topical <User Schedule>  epoetin val (PROCRIT) Injectable 79229 Unit(s) IV Push <User Schedule>  folic acid 1 milliGRAM(s) Oral daily  heparin   Injectable 5000 Unit(s) SubCutaneous every 12 hours  hydrocortisone hemorrhoidal Suppository 1 Suppository(s) Rectal two times a day  influenza  Vaccine (HIGH DOSE) 0.7 milliLiter(s) IntraMuscular once  levothyroxine 150 MICROGram(s) Oral daily  mesalamine Suppository 1000 milliGRAM(s) Rectal at bedtime  midodrine 10 milliGRAM(s) Oral every 8 hours  pantoprazole   Suspension 40 milliGRAM(s) Enteral Tube daily  polyethylene glycol 3350 17 Gram(s) Oral daily  senna 2 Tablet(s) Oral at bedtime  vancomycin  IVPB 750 milliGRAM(s) IV Intermittent <User Schedule>    MEDICATIONS  (PRN):  acetaminophen     Tablet .. 650 milliGRAM(s) Oral every 6 hours PRN Mild Pain (1 - 3)  aluminum hydroxide/magnesium hydroxide/simethicone Suspension 10 milliLiter(s) Oral every 6 hours PRN dyspepsia  sodium chloride 0.9% lock flush 10 milliLiter(s) IV Push every 1 hour PRN Pre/post blood products, medications, blood draw, and to maintain line patency      Antibiotics History  caspofungin IVPB    , 10-01-23 @ 08:49  caspofungin IVPB 50 milliGRAM(s) IV Intermittent every 24 hours, 10-02-23 @ 08:48  caspofungin IVPB 70 milliGRAM(s) IV Intermittent once, 10-01-23 @ 08:48  cefepime   IVPB 1000 milliGRAM(s) IV Intermittent once, 09-30-23 @ 19:12, Stop order after: 1 Doses  ceftaroline fosamil IVPB 200 milliGRAM(s) IV Intermittent every 12 hours, 10-08-23 @ 18:00  ceftaroline fosamil IVPB    , 10-08-23 @ 10:32  ceftaroline fosamil IVPB 200 milliGRAM(s) IV Intermittent once, 10-09-23 @ 09:43  ceftaroline fosamil IVPB    , 10-09-23 @ 09:43  ceftaroline fosamil IVPB 200 milliGRAM(s) IV Intermittent once, 10-08-23 @ 08:35  ceftaroline fosamil IVPB 200 milliGRAM(s) IV Intermittent every 8 hours, 10-09-23 @ 14:00  DAPTOmycin IVPB 720 milliGRAM(s) IV Intermittent every 48 hours, 10-09-23 @ 14:10, Stop order after: 42 Days  meropenem  IVPB 500 milliGRAM(s) IV Intermittent once, 10-01-23 @ 09:02, Stop order after: 1 Doses  meropenem  IVPB 500 milliGRAM(s) IV Intermittent every 24 hours, 10-02-23 @ 09:02, Stop order after: 8 Days  meropenem  IVPB    , 10-01-23 @ 09:03  piperacillin/tazobactam IVPB.. 3.375 Gram(s) IV Intermittent every 12 hours, 09-30-23 @ 22:40, Stop order after: 7 Days  vancomycin  IVPB    , 10-01-23 @ 09:00  vancomycin  IVPB 1000 milliGRAM(s) IV Intermittent once, 10-04-23 @ 08:29, Stop order after: 1 Doses  vancomycin  IVPB 1000 milliGRAM(s) IV Intermittent every 24 hours, 10-03-23 @ 00:00  vancomycin  IVPB 1000 milliGRAM(s) IV Intermittent once, 10-01-23 @ 09:00, Stop order after: 1 Doses  vancomycin  IVPB 1000 milliGRAM(s) IV Intermittent every 24 hours, 10-02-23 @ 09:00, Stop order after: 1 Doses  vancomycin  IVPB 750 milliGRAM(s) IV Intermittent once, 10-07-23 @ 08:15, Stop order after: 1 Doses  vancomycin  IVPB 750 milliGRAM(s) IV Intermittent <User Schedule>, 10-09-23 @ 00:00, Stop order after: 42 Days  vancomycin  IVPB. 1000 milliGRAM(s) IV Intermittent once, 09-30-23 @ 19:35, Stop order after: 1 Doses      Heme Medications   aspirin  chewable 81 milliGRAM(s) Oral daily, 10-01-23 @ 12:50  heparin   Injectable 5000 Unit(s) SubCutaneous every 12 hours, 10-07-23 @ 10:11      GI Medications  aluminum hydroxide/magnesium hydroxide/simethicone Suspension 10 milliLiter(s) Oral every 6 hours, 10-01-23 @ 13:00, Routine PRN  mesalamine Suppository 1000 milliGRAM(s) Rectal at bedtime, 10-01-23 @ 12:49, Routine  pantoprazole   Suspension 40 milliGRAM(s) Enteral Tube daily, 10-06-23 @ 09:28, Routine  polyethylene glycol 3350 17 Gram(s) Oral daily, 10-01-23 @ 12:36, Routine  senna 2 Tablet(s) Oral at bedtime, 10-01-23 @ 12:36, Routine      COVID  10-01-23 @ 15:45  COVID -   NotDetec  09-30-23 @ 19:15  [facility]      COVID Biomarkers    10-02-23 @ 10:00 ESR --  ---  CRP --  ---  DDimer  --   ---   LDH --   ---   Ferritin 1029<H>    10-01-23 @ 09:40 ESR --  ---  CRP --  ---  DDimer  2066<H>   ---      ---   Ferritin --             WBC Trend  10-09-23 @ 07:00   -  18.06<H>  10-08-23 @ 06:30   -  19.97<H>  10-07-23 @ 06:00   -  15.48<H>    H/H Trend  10-09-23 @ 07:00   -   7.8<L>/ 22.2<L>  10-08-23 @ 06:30   -   7.9<L>/ 24.4<L>  10-07-23 @ 06:00   -   7.7<L>/ 23.3<L>  10-06-23 @ 05:00   -   8.5<L>/ 25.5<L>  10-05-23 @ 05:30   -   8.3<L>/ 23.7<L>  10-04-23 @ 15:25   -   9.0<L>/ 26.6<L>    Stool Occult Blood    Platelet Trend  10-09-23 @ 07:00   -  111<L>  10-08-23 @ 06:30   -  107<L>  10-07-23 @ 06:00   -  116<L>    Trend Sodium  10-09-23 @ 07:00   -  134<L>  10-08-23 @ 06:30   -  136  10-07-23 @ 06:00   -  139    Trend Potassium  10-09-23 @ 07:00   -  4.1  10-08-23 @ 06:30   -  3.4<L>  10-07-23 @ 06:00   -  3.5    Trend Bun/Cr  10-09-23 @ 07:00  BUN/CR -  82<H> / 5.27<H>  10-08-23 @ 06:30  BUN/CR -  66<H> / 4.80<H>  10-07-23 @ 06:00  BUN/CR -  52<H> / 3.93<H>    Lactic Acid Trend  10-01-23 @ 05:30   -   1.6  09-30-23 @ 19:15   -   1.1    ABG Trend    Trend AST/ALT/ALK Phos/Bili  10-09-23 @ 07:00   25/27/243<H>/0.8  10-08-23 @ 06:30   42<H>/27/250<H>/0.6  10-07-23 @ 06:00   28/26/260<H>/0.7  10-06-23 @ 05:00   31/32/331<H>/0.6  10-03-23 @ 15:51   86<H>/79<H>/397<H>/0.7  10-03-23 @ 06:32   98<H>/78<H>/369<H>/0.6       LABS:                        7.8    18.06 )-----------( 111      ( 09 Oct 2023 07:00 )             22.2     10-09    134<L>  |  98  |  82<H>  ----------------------------<  96  4.1   |  27  |  5.27<H>    Ca    10.1      09 Oct 2023 07:00  Phos  3.2     10-09  Mg     1.9     10-09    TPro  6.0  /  Alb  2.1<L>  /  TBili  0.8  /  DBili  x   /  AST  25  /  ALT  27  /  AlkPhos  243<H>  10-09            PT/INR - ( 08 Oct 2023 06:30 )   PT: 12.1 sec;   INR: 1.06 ratio         PTT - ( 08 Oct 2023 06:30 )  PTT:30.8 sec  Urinalysis Basic - ( 09 Oct 2023 07:00 )    Color: x / Appearance: x / SG: x / pH: x  Gluc: 96 mg/dL / Ketone: x  / Bili: x / Urobili: x   Blood: x / Protein: x / Nitrite: x   Leuk Esterase: x / RBC: x / WBC x   Sq Epi: x / Non Sq Epi: x / Bacteria: x       CULTURES: (if applicable)    Culture - Blood (collected 10-06-23 @ 10:20)  Source: .Blood Blood-Peripheral  Gram Stain (10-07-23 @ 04:46):    Growth in aerobic bottle: Gram Positive Cocci in Clusters    Growth in anaerobic bottle: Gram Positive Cocci in Clusters  Final Report (10-08-23 @ 08:18):    Growth in aerobic and anaerobic bottles: Methicillin Resistant    Staphylococcus aureus    See previous culture [id number]    Culture - Blood (collected 10-04-23 @ 06:00)  Source: .Blood Blood  Gram Stain (10-05-23 @ 07:40):    Growth in aerobic bottle: Gram Positive Cocci in Clusters    Growth in anaerobic bottle: Gram Positive Cocci in Clusters  Final Report (10-06-23 @ 14:49):    Growth in aerobic and anaerobic bottles: Methicillin Resistant    Staphylococcus aureus    See previous culture [id number]    Culture - Blood (collected 10-04-23 @ 06:00)  Source: .Blood Blood  Gram Stain (10-05-23 @ 08:18):    Growth in aerobic bottle: Gram Positive Cocci in Clusters    Growth in anaerobic bottle: Gram Positive Cocci in Clusters  Final Report (10-06-23 @ 14:47):    Growth in aerobic and anaerobic bottles: Methicillin Resistant    Staphylococcus aureus  Organism: Methicillin resistant Staphylococcus aureus (10-06-23 @ 14:47)  Organism: Methicillin resistant Staphylococcus aureus (10-06-23 @ 14:47)      Method Type: LEWIS      -  Ampicillin/Sulbactam: R <=8/4      -  Cefazolin: R >16      -  Clindamycin: R >4      -  Daptomycin: S 1      -  Erythromycin: R >4      -  Gentamicin: S <=1 Should not be used as monotherapy      -  Linezolid: S 2      -  Oxacillin: R >2      -  Penicillin: R 8      -  Rifampin: S <=1 Should not be used as monotherapy      -  Tetracycline: S 2      -  Trimethoprim/Sulfamethoxazole: S <=0.5/9.5      -  Vancomycin: S 2    Culture - Catheter (collected 10-02-23 @ 16:21)  Source: .Catheter + MRSA Bacterimia Permacath Removal Aerobic/Anaerobic Right  IJ Permacath TIP  Final Report (10-05-23 @ 14:50):    Greater than or equal to 15 Colonies Methicillin Resistant Staphylococcus    aureus  Organism: Methicillin resistant Staphylococcus aureus (10-05-23 @ 14:50)  Organism: Methicillin resistant Staphylococcus aureus (10-05-23 @ 14:50)      Method Type: LEWIS      -  Ampicillin/Sulbactam: R 16/8      -  Cefazolin: R >16      -  Clindamycin: R >4      -  Daptomycin: S 1      -  Erythromycin: R >4      -  Gentamicin: S <=1 Should not be used as monotherapy      -  Linezolid: S 4      -  Oxacillin: R >2      -  Penicillin: R >8      -  Rifampin: S <=1 Should not be used as monotherapy      -  Tetracycline: S 2      -  Trimethoprim/Sulfamethoxazole: S <=0.5/9.5      -  Vancomycin: S 2    Culture - Blood (collected 09-30-23 @ 19:15)  Source: .Blood Blood-Peripheral  Gram Stain (10-01-23 @ 11:24):    Growth in anaerobic bottle: Gram Positive Cocci in Clusters    Growth in aerobic bottle: Gram Positive Cocci in Clusters  Final Report (10-03-23 @ 07:40):    Growth in aerobic and anaerobic bottles: Methicillin Resistant    Staphylococcus aureus    See previous culture  [id number]    Culture - Blood (collected 09-30-23 @ 19:10)  Source: .Blood Blood-Peripheral  Gram Stain (10-01-23 @ 11:02):    Growth in anaerobic bottle: Gram Positive Cocci in Clusters    Growth in aerobic bottle: Gram Positive Cocci in Clusters  Final Report (10-03-23 @ 07:39):    Growth in aerobic and anaerobic bottles: Methicillin Resistant    Staphylococcus aureus    Direct identification is available within approximately 3-5    hours either by Blood Panel Multiplexed PCR or Direct    MALDI-TOF. Details: [email or web address]  Organism: Blood Culture PCR  Methicillin resistant Staphylococcus aureus (10-03-23 @ 07:39)  Organism: Methicillin resistant Staphylococcus aureus (10-03-23 @ 07:39)      Method Type: LEWIS      -  Ampicillin/Sulbactam: R 16/8      -  Cefazolin: R >16      -  Clindamycin: R >4      -  Daptomycin: S 1      -  Erythromycin: R >4      -  Gentamicin: S <=1 Should not be used as monotherapy      -  Linezolid: S 4      -  Oxacillin: R >2      -  Penicillin: R >8      -  Rifampin: S <=1 Should not be used as monotherapy      -  Tetracycline: S 2      -  Trimethoprim/Sulfamethoxazole: S <=0.5/9.5      -  Vancomycin: S 2  Organism: Blood Culture PCR (10-03-23 @ 07:39)      Method Type: PCR      -  Methicillin resistant Staphylococcus aureus (MRSA): Detec      Rapid RVP Result: NotDetec (10-01-23 @ 15:45)     VITALS:  T(C): 36.6 (10-09-23 @ 12:30), Max: 37.7 (10-08-23 @ 21:19)  T(F): 97.9 (10-09-23 @ 12:30), Max: 99.9 (10-08-23 @ 21:19)  HR: 93 (10-09-23 @ 13:07) (74 - 122)  BP: 89/52 (10-09-23 @ 13:07) (89/52 - 127/66)  BP(mean): 64 (10-09-23 @ 13:07) (64 - 80)  ABP: --  ABP(mean): --  RR: 18 (10-09-23 @ 12:30) (18 - 19)  SpO2: 92% (10-09-23 @ 12:30) (92% - 98%)  CVP(mm Hg): --  CVP(cm H2O): --    Ins and Outs     10-08-23 @ 07:01  -  10-09-23 @ 07:00  --------------------------------------------------------  IN: 310 mL / OUT: 0 mL / NET: 310 mL                I&O's Detail    08 Oct 2023 07:01  -  09 Oct 2023 07:00  --------------------------------------------------------  IN:    IV PiggyBack: 50 mL    Nepro: 260 mL  Total IN: 310 mL    OUT:    Voided (mL): 0 mL  Total OUT: 0 mL    Total NET: 310 mL      Follow-up Critical Care Progress Note  Chief Complaint : Pneumonia due to infectious organism    Patient seen and examined  more alert  states wants to go home  but is confused    Allergies :levofloxacin (Unknown)  alfuzosin (Unknown)  tamsulosin (Unknown)  Myrbetriq (Unknown)      PAST MEDICAL & SURGICAL HISTORY:  Chronic atrial fibrillation    History of BPH    CAD (coronary artery disease)    Coronary stent patent    ESRD on dialysis    History of GI bleed    Mitral valve replaced        Medications:  MEDICATIONS  (STANDING):  aspirin  chewable 81 milliGRAM(s) Oral daily  ceftaroline fosamil IVPB 200 milliGRAM(s) IV Intermittent every 8 hours  ceftaroline fosamil IVPB 200 milliGRAM(s) IV Intermittent once  ceftaroline fosamil IVPB      chlorhexidine 2% Cloths 1 Application(s) Topical <User Schedule>  epoetin val (PROCRIT) Injectable 27937 Unit(s) IV Push <User Schedule>  folic acid 1 milliGRAM(s) Oral daily  heparin   Injectable 5000 Unit(s) SubCutaneous every 12 hours  hydrocortisone hemorrhoidal Suppository 1 Suppository(s) Rectal two times a day  influenza  Vaccine (HIGH DOSE) 0.7 milliLiter(s) IntraMuscular once  levothyroxine 150 MICROGram(s) Oral daily  mesalamine Suppository 1000 milliGRAM(s) Rectal at bedtime  midodrine 10 milliGRAM(s) Oral every 8 hours  pantoprazole   Suspension 40 milliGRAM(s) Enteral Tube daily  polyethylene glycol 3350 17 Gram(s) Oral daily  senna 2 Tablet(s) Oral at bedtime  vancomycin  IVPB 750 milliGRAM(s) IV Intermittent <User Schedule>    MEDICATIONS  (PRN):  acetaminophen     Tablet .. 650 milliGRAM(s) Oral every 6 hours PRN Mild Pain (1 - 3)  aluminum hydroxide/magnesium hydroxide/simethicone Suspension 10 milliLiter(s) Oral every 6 hours PRN dyspepsia  sodium chloride 0.9% lock flush 10 milliLiter(s) IV Push every 1 hour PRN Pre/post blood products, medications, blood draw, and to maintain line patency      Antibiotics History  caspofungin IVPB    , 10-01-23 @ 08:49  caspofungin IVPB 50 milliGRAM(s) IV Intermittent every 24 hours, 10-02-23 @ 08:48  caspofungin IVPB 70 milliGRAM(s) IV Intermittent once, 10-01-23 @ 08:48  cefepime   IVPB 1000 milliGRAM(s) IV Intermittent once, 09-30-23 @ 19:12, Stop order after: 1 Doses  ceftaroline fosamil IVPB 200 milliGRAM(s) IV Intermittent every 12 hours, 10-08-23 @ 18:00  ceftaroline fosamil IVPB    , 10-08-23 @ 10:32  ceftaroline fosamil IVPB 200 milliGRAM(s) IV Intermittent once, 10-09-23 @ 09:43  ceftaroline fosamil IVPB    , 10-09-23 @ 09:43  ceftaroline fosamil IVPB 200 milliGRAM(s) IV Intermittent once, 10-08-23 @ 08:35  ceftaroline fosamil IVPB 200 milliGRAM(s) IV Intermittent every 8 hours, 10-09-23 @ 14:00  DAPTOmycin IVPB 720 milliGRAM(s) IV Intermittent every 48 hours, 10-09-23 @ 14:10, Stop order after: 42 Days  meropenem  IVPB 500 milliGRAM(s) IV Intermittent once, 10-01-23 @ 09:02, Stop order after: 1 Doses  meropenem  IVPB 500 milliGRAM(s) IV Intermittent every 24 hours, 10-02-23 @ 09:02, Stop order after: 8 Days  meropenem  IVPB    , 10-01-23 @ 09:03  piperacillin/tazobactam IVPB.. 3.375 Gram(s) IV Intermittent every 12 hours, 09-30-23 @ 22:40, Stop order after: 7 Days  vancomycin  IVPB    , 10-01-23 @ 09:00  vancomycin  IVPB 1000 milliGRAM(s) IV Intermittent once, 10-04-23 @ 08:29, Stop order after: 1 Doses  vancomycin  IVPB 1000 milliGRAM(s) IV Intermittent every 24 hours, 10-03-23 @ 00:00  vancomycin  IVPB 1000 milliGRAM(s) IV Intermittent once, 10-01-23 @ 09:00, Stop order after: 1 Doses  vancomycin  IVPB 1000 milliGRAM(s) IV Intermittent every 24 hours, 10-02-23 @ 09:00, Stop order after: 1 Doses  vancomycin  IVPB 750 milliGRAM(s) IV Intermittent once, 10-07-23 @ 08:15, Stop order after: 1 Doses  vancomycin  IVPB 750 milliGRAM(s) IV Intermittent <User Schedule>, 10-09-23 @ 00:00, Stop order after: 42 Days  vancomycin  IVPB. 1000 milliGRAM(s) IV Intermittent once, 09-30-23 @ 19:35, Stop order after: 1 Doses      Heme Medications   aspirin  chewable 81 milliGRAM(s) Oral daily, 10-01-23 @ 12:50  heparin   Injectable 5000 Unit(s) SubCutaneous every 12 hours, 10-07-23 @ 10:11      GI Medications  aluminum hydroxide/magnesium hydroxide/simethicone Suspension 10 milliLiter(s) Oral every 6 hours, 10-01-23 @ 13:00, Routine PRN  mesalamine Suppository 1000 milliGRAM(s) Rectal at bedtime, 10-01-23 @ 12:49, Routine  pantoprazole   Suspension 40 milliGRAM(s) Enteral Tube daily, 10-06-23 @ 09:28, Routine  polyethylene glycol 3350 17 Gram(s) Oral daily, 10-01-23 @ 12:36, Routine  senna 2 Tablet(s) Oral at bedtime, 10-01-23 @ 12:36, Routine      COVID  10-01-23 @ 15:45  COVID -   NotDetec  09-30-23 @ 19:15  [facility]   [facility]      COVID Biomarkers    10-02-23 @ 10:00 ESR --  ---  CRP --  ---  DDimer  --   ---   LDH --   ---   Ferritin 1029<H>    10-01-23 @ 09:40 ESR --  ---  CRP --  ---  DDimer  2066<H>   ---      ---   Ferritin --             WBC Trend  10-09-23 @ 07:00   -  18.06<H>  10-08-23 @ 06:30   -  19.97<H>  10-07-23 @ 06:00   -  15.48<H>    H/H Trend  10-09-23 @ 07:00   -   7.8<L>/ 22.2<L>  10-08-23 @ 06:30   -   7.9<L>/ 24.4<L>  10-07-23 @ 06:00   -   7.7<L>/ 23.3<L>  10-06-23 @ 05:00   -   8.5<L>/ 25.5<L>  10-05-23 @ 05:30   -   8.3<L>/ 23.7<L>  10-04-23 @ 15:25   -   9.0<L>/ 26.6<L>    Stool Occult Blood    Platelet Trend  10-09-23 @ 07:00   -  111<L>  10-08-23 @ 06:30   -  107<L>  10-07-23 @ 06:00   -  116<L>    Trend Sodium  10-09-23 @ 07:00   -  134<L>  10-08-23 @ 06:30   -  136  10-07-23 @ 06:00   -  139    Trend Potassium  10-09-23 @ 07:00   -  4.1  10-08-23 @ 06:30   -  3.4<L>  10-07-23 @ 06:00   -  3.5    Trend Bun/Cr  10-09-23 @ 07:00  BUN/CR -  82<H> / 5.27<H>  10-08-23 @ 06:30  BUN/CR -  66<H> / 4.80<H>  10-07-23 @ 06:00  BUN/CR -  52<H> / 3.93<H>    Lactic Acid Trend  10-01-23 @ 05:30   -   1.6  09-30-23 @ 19:15   -   1.1    ABG Trend    Trend AST/ALT/ALK Phos/Bili  10-09-23 @ 07:00   25/27/243<H>/0.8  10-08-23 @ 06:30   42<H>/27/250<H>/0.6  10-07-23 @ 06:00   28/26/260<H>/0.7  10-06-23 @ 05:00   31/32/331<H>/0.6  10-03-23 @ 15:51   86<H>/79<H>/397<H>/0.7  10-03-23 @ 06:32   98<H>/78<H>/369<H>/0.6       LABS:                        7.8    18.06 )-----------( 111      ( 09 Oct 2023 07:00 )             22.2     10-09    134<L>  |  98  |  82<H>  ----------------------------<  96  4.1   |  27  |  5.27<H>    Ca    10.1      09 Oct 2023 07:00  Phos  3.2     10-09  Mg     1.9     10-09    TPro  6.0  /  Alb  2.1<L>  /  TBili  0.8  /  DBili  x   /  AST  25  /  ALT  27  /  AlkPhos  243<H>  10-09            PT/INR - ( 08 Oct 2023 06:30 )   PT: 12.1 sec;   INR: 1.06 ratio         PTT - ( 08 Oct 2023 06:30 )  PTT:30.8 sec  Urinalysis Basic - ( 09 Oct 2023 07:00 )    Color: x / Appearance: x / SG: x / pH: x  Gluc: 96 mg/dL / Ketone: x  / Bili: x / Urobili: x   Blood: x / Protein: x / Nitrite: x   Leuk Esterase: x / RBC: x / WBC x   Sq Epi: x / Non Sq Epi: x / Bacteria: x       CULTURES: (if applicable)    Culture - Blood (collected 10-06-23 @ 10:20)  Source: .Blood Blood-Peripheral  Gram Stain (10-07-23 @ 04:46):    Growth in aerobic bottle: Gram Positive Cocci in Clusters    Growth in anaerobic bottle: Gram Positive Cocci in Clusters  Final Report (10-08-23 @ 08:18):    Growth in aerobic and anaerobic bottles: Methicillin Resistant    Staphylococcus aureus    See previous culture [id number]    Culture - Blood (collected 10-04-23 @ 06:00)  Source: .Blood Blood  Gram Stain (10-05-23 @ 07:40):    Growth in aerobic bottle: Gram Positive Cocci in Clusters    Growth in anaerobic bottle: Gram Positive Cocci in Clusters  Final Report (10-06-23 @ 14:49):    Growth in aerobic and anaerobic bottles: Methicillin Resistant    Staphylococcus aureus    See previous culture [id number]    Culture - Blood (collected 10-04-23 @ 06:00)  Source: .Blood Blood  Gram Stain (10-05-23 @ 08:18):    Growth in aerobic bottle: Gram Positive Cocci in Clusters    Growth in anaerobic bottle: Gram Positive Cocci in Clusters  Final Report (10-06-23 @ 14:47):    Growth in aerobic and anaerobic bottles: Methicillin Resistant    Staphylococcus aureus  Organism: Methicillin resistant Staphylococcus aureus (10-06-23 @ 14:47)  Organism: Methicillin resistant Staphylococcus aureus (10-06-23 @ 14:47)      Method Type: LEWIS      -  Ampicillin/Sulbactam: R <=8/4      -  Cefazolin: R >16      -  Clindamycin: R >4      -  Daptomycin: S 1      -  Erythromycin: R >4      -  Gentamicin: S <=1 Should not be used as monotherapy      -  Linezolid: S 2      -  Oxacillin: R >2      -  Penicillin: R 8      -  Rifampin: S <=1 Should not be used as monotherapy      -  Tetracycline: S 2      -  Trimethoprim/Sulfamethoxazole: S <=0.5/9.5      -  Vancomycin: S 2    Culture - Catheter (collected 10-02-23 @ 16:21)  Source: .Catheter + MRSA Bacterimia Permacath Removal Aerobic/Anaerobic Right  IJ Permacath TIP  Final Report (10-05-23 @ 14:50):    Greater than or equal to 15 Colonies Methicillin Resistant Staphylococcus    aureus  Organism: Methicillin resistant Staphylococcus aureus (10-05-23 @ 14:50)  Organism: Methicillin resistant Staphylococcus aureus (10-05-23 @ 14:50)      Method Type: LEWIS      -  Ampicillin/Sulbactam: R 16/8      -  Cefazolin: R >16      -  Clindamycin: R >4      -  Daptomycin: S 1      -  Erythromycin: R >4      -  Gentamicin: S <=1 Should not be used as monotherapy      -  Linezolid: S 4      -  Oxacillin: R >2      -  Penicillin: R >8      -  Rifampin: S <=1 Should not be used as monotherapy      -  Tetracycline: S 2      -  Trimethoprim/Sulfamethoxazole: S <=0.5/9.5      -  Vancomycin: S 2    Culture - Blood (collected 09-30-23 @ 19:15)  Source: .Blood Blood-Peripheral  Gram Stain (10-01-23 @ 11:24):    Growth in anaerobic bottle: Gram Positive Cocci in Clusters    Growth in aerobic bottle: Gram Positive Cocci in Clusters  Final Report (10-03-23 @ 07:40):    Growth in aerobic and anaerobic bottles: Methicillin Resistant    Staphylococcus aureus    See previous culture  [id number]    Culture - Blood (collected 09-30-23 @ 19:10)  Source: .Blood Blood-Peripheral  Gram Stain (10-01-23 @ 11:02):    Growth in anaerobic bottle: Gram Positive Cocci in Clusters    Growth in aerobic bottle: Gram Positive Cocci in Clusters  Final Report (10-03-23 @ 07:39):    Growth in aerobic and anaerobic bottles: Methicillin Resistant    Staphylococcus aureus    Direct identification is available within approximately 3-5    hours either by Blood Panel Multiplexed PCR or Direct    MALDI-TOF. Details: [email or web address]  Organism: Blood Culture PCR  Methicillin resistant Staphylococcus aureus (10-03-23 @ 07:39)  Organism: Methicillin resistant Staphylococcus aureus (10-03-23 @ 07:39)      Method Type: LEWIS      -  Ampicillin/Sulbactam: R 16/8      -  Cefazolin: R >16      -  Clindamycin: R >4      -  Daptomycin: S 1      -  Erythromycin: R >4      -  Gentamicin: S <=1 Should not be used as monotherapy      -  Linezolid: S 4      -  Oxacillin: R >2      -  Penicillin: R >8      -  Rifampin: S <=1 Should not be used as monotherapy      -  Tetracycline: S 2      -  Trimethoprim/Sulfamethoxazole: S <=0.5/9.5      -  Vancomycin: S 2  Organism: Blood Culture PCR (10-03-23 @ 07:39)      Method Type: PCR      -  Methicillin resistant Staphylococcus aureus (MRSA): Detec      Rapid RVP Result: NotDetec (10-01-23 @ 15:45)     VITALS:  T(C): 36.6 (10-09-23 @ 12:30), Max: 37.7 (10-08-23 @ 21:19)  T(F): 97.9 (10-09-23 @ 12:30), Max: 99.9 (10-08-23 @ 21:19)  HR: 93 (10-09-23 @ 13:07) (74 - 122)  BP: 89/52 (10-09-23 @ 13:07) (89/52 - 127/66)  BP(mean): 64 (10-09-23 @ 13:07) (64 - 80)  ABP: --  ABP(mean): --  RR: 18 (10-09-23 @ 12:30) (18 - 19)  SpO2: 92% (10-09-23 @ 12:30) (92% - 98%)  CVP(mm Hg): --  CVP(cm H2O): --    Ins and Outs     10-08-23 @ 07:01  -  10-09-23 @ 07:00  --------------------------------------------------------  IN: 310 mL / OUT: 0 mL / NET: 310 mL                I&O's Detail    08 Oct 2023 07:01  -  09 Oct 2023 07:00  --------------------------------------------------------  IN:    IV PiggyBack: 50 mL    Nepro: 260 mL  Total IN: 310 mL    OUT:    Voided (mL): 0 mL  Total OUT: 0 mL    Total NET: 310 mL

## 2023-10-09 NOTE — PROGRESS NOTE ADULT - SUBJECTIVE AND OBJECTIVE BOX
Progress: remains very lethargic, wakes w/ stim., but cannot keep eyes open long, mumbles few words     Review of Systems: [ Unable to obtain due to poor mentation]    MEDICATIONS  (STANDING):  aspirin  chewable 81 milliGRAM(s) Oral daily  ceftaroline fosamil IVPB 200 milliGRAM(s) IV Intermittent once  ceftaroline fosamil IVPB      ceftaroline fosamil IVPB 200 milliGRAM(s) IV Intermittent every 8 hours  chlorhexidine 2% Cloths 1 Application(s) Topical <User Schedule>  epoetin val (PROCRIT) Injectable 61224 Unit(s) IV Push <User Schedule>  folic acid 1 milliGRAM(s) Oral daily  heparin   Injectable 5000 Unit(s) SubCutaneous every 12 hours  hydrocortisone hemorrhoidal Suppository 1 Suppository(s) Rectal two times a day  influenza  Vaccine (HIGH DOSE) 0.7 milliLiter(s) IntraMuscular once  levothyroxine 137 MICROGram(s) Oral daily  mesalamine Suppository 1000 milliGRAM(s) Rectal at bedtime  midodrine 10 milliGRAM(s) Oral every 8 hours  pantoprazole   Suspension 40 milliGRAM(s) Enteral Tube daily  polyethylene glycol 3350 17 Gram(s) Oral daily  senna 2 Tablet(s) Oral at bedtime  vancomycin  IVPB 750 milliGRAM(s) IV Intermittent <User Schedule>    MEDICATIONS  (PRN):  acetaminophen     Tablet .. 650 milliGRAM(s) Oral every 6 hours PRN Mild Pain (1 - 3)  aluminum hydroxide/magnesium hydroxide/simethicone Suspension 10 milliLiter(s) Oral every 6 hours PRN dyspepsia  sodium chloride 0.9% lock flush 10 milliLiter(s) IV Push every 1 hour PRN Pre/post blood products, medications, blood draw, and to maintain line patency      PHYSICAL EXAM:  Vital Signs Last 24 Hrs  T(C): 36.6 (09 Oct 2023 12:30), Max: 37.7 (08 Oct 2023 21:19)  T(F): 97.9 (09 Oct 2023 12:30), Max: 99.9 (08 Oct 2023 21:19)  HR: 93 (09 Oct 2023 13:07) (74 - 122)  BP: 89/52 (09 Oct 2023 13:07) (89/52 - 127/66)  BP(mean): 64 (09 Oct 2023 13:07) (64 - 80)  RR: 18 (09 Oct 2023 12:30) (18 - 19)  SpO2: 92% (09 Oct 2023 12:30) (92% - 98%)    Parameters below as of 09 Oct 2023 12:30  Patient On (Oxygen Delivery Method): room air      General:  drowsy , wakes w/ stim., mumbles,  oriented x 1      HEENT: n/c, a/t  dry mouth lips     Lungs: coarse , few upper rhonchi   CV: normal - tachy     GI: abd soft, n/t     : normal    Musculoskeletal: edematous, senile purpura    Skin: pale,     Neuro:  deficits lethargic   Oral intake ability:  oral feeding- minimal - full assist   Diet: pureed w/ thins  when awake      LABS:                          7.8    18.06 )-----------( 111      ( 09 Oct 2023 07:00 )             22.2     10-09    134<L>  |  98  |  82<H>  ----------------------------<  96  4.1   |  27  |  5.27<H>    Ca    10.1      09 Oct 2023 07:00  Phos  3.2     10-09  Mg     1.9     10-09    TPro  6.0  /  Alb  2.1<L>  /  TBili  0.8  /  DBili  x   /  AST  25  /  ALT  27  /  AlkPhos  243<H>  10-09    Urinalysis Basic - ( 09 Oct 2023 07:00 )    Color: x / Appearance: x / SG: x / pH: x  Gluc: 96 mg/dL / Ketone: x  / Bili: x / Urobili: x   Blood: x / Protein: x / Nitrite: x   Leuk Esterase: x / RBC: x / WBC x   Sq Epi: x / Non Sq Epi: x / Bacteria: x        RADIOLOGY & ADDITIONAL STUDIES: < from: Xray Chest 1 View- PORTABLE-Routine (Xray Chest 1 View- PORTABLE-Routine in AM.) (10.06.23 @ 08:23) >  PROCEDURE DATE:  10/06/2023          INTERPRETATION:  CLINICAL INDICATION: 78 years  Male with eval pna.    COMPARISON: 10/5/2023    Right central line tip overlies the superior vena cava. Enteric catheter   tip is below the diaphragm.    AP view of the chest demonstrates persistent moderate right pleural   effusion and no left pleural effusion. The pulmonary vasculature is   normal.    The heart is enlarged. Sternotomy and cardiac valve prosthesis. A   leadless pacemaker and left atrial appendage clips are reidentified.    The mediastinum and robert cannot be assessed due to rotation.    Mild thoracic degenerative changes are present.    IMPRESSION:    No significant change.    Moderate right pleural effusion. Cannot exclude underlying infiltrate or   atelectasis.    Cardiomegaly.    Right central line and enteric catheter in satisfactory position.          ADVANCE DIRECTIVES:  Hcp  full code   Advanced Care Planning discussion total time spent:     Progress: remains very lethargic, wakes w/ stim., but cannot keep eyes open long, mumbles few words     Review of Systems: [ Unable to obtain due to poor mentation]    MEDICATIONS  (STANDING):  aspirin  chewable 81 milliGRAM(s) Oral daily  ceftaroline fosamil IVPB 200 milliGRAM(s) IV Intermittent once  ceftaroline fosamil IVPB      ceftaroline fosamil IVPB 200 milliGRAM(s) IV Intermittent every 8 hours  chlorhexidine 2% Cloths 1 Application(s) Topical <User Schedule>  epoetin val (PROCRIT) Injectable 65086 Unit(s) IV Push <User Schedule>  folic acid 1 milliGRAM(s) Oral daily  heparin   Injectable 5000 Unit(s) SubCutaneous every 12 hours  hydrocortisone hemorrhoidal Suppository 1 Suppository(s) Rectal two times a day  influenza  Vaccine (HIGH DOSE) 0.7 milliLiter(s) IntraMuscular once  levothyroxine 137 MICROGram(s) Oral daily  mesalamine Suppository 1000 milliGRAM(s) Rectal at bedtime  midodrine 10 milliGRAM(s) Oral every 8 hours  pantoprazole   Suspension 40 milliGRAM(s) Enteral Tube daily  polyethylene glycol 3350 17 Gram(s) Oral daily  senna 2 Tablet(s) Oral at bedtime  vancomycin  IVPB 750 milliGRAM(s) IV Intermittent <User Schedule>    MEDICATIONS  (PRN):  acetaminophen     Tablet .. 650 milliGRAM(s) Oral every 6 hours PRN Mild Pain (1 - 3)  aluminum hydroxide/magnesium hydroxide/simethicone Suspension 10 milliLiter(s) Oral every 6 hours PRN dyspepsia  sodium chloride 0.9% lock flush 10 milliLiter(s) IV Push every 1 hour PRN Pre/post blood products, medications, blood draw, and to maintain line patency      PHYSICAL EXAM:  Vital Signs Last 24 Hrs  T(C): 36.6 (09 Oct 2023 12:30), Max: 37.7 (08 Oct 2023 21:19)  T(F): 97.9 (09 Oct 2023 12:30), Max: 99.9 (08 Oct 2023 21:19)  HR: 93 (09 Oct 2023 13:07) (74 - 122)  BP: 89/52 (09 Oct 2023 13:07) (89/52 - 127/66)  BP(mean): 64 (09 Oct 2023 13:07) (64 - 80)  RR: 18 (09 Oct 2023 12:30) (18 - 19)  SpO2: 92% (09 Oct 2023 12:30) (92% - 98%)    Parameters below as of 09 Oct 2023 12:30  Patient On (Oxygen Delivery Method): room air      General:  drowsy , wakes w/ stim., mumbles,  oriented x 1      HEENT: n/c, a/t  dry mouth lips     Lungs: coarse , few upper rhonchi   CV: normal - tachy     GI: abd soft, n/t     : normal    Musculoskeletal: edematous, senile purpura    Skin: pale,     Neuro:  deficits lethargic   Oral intake ability:  oral feeding- minimal - full assist   Diet: pureed w/ thins  when awake      LABS:                          7.8    18.06 )-----------( 111      ( 09 Oct 2023 07:00 )             22.2     10-09    134<L>  |  98  |  82<H>  ----------------------------<  96  4.1   |  27  |  5.27<H>    Ca    10.1      09 Oct 2023 07:00  Phos  3.2     10-09  Mg     1.9     10-09    TPro  6.0  /  Alb  2.1<L>  /  TBili  0.8  /  DBili  x   /  AST  25  /  ALT  27  /  AlkPhos  243<H>  10-09    Urinalysis Basic - ( 09 Oct 2023 07:00 )    Color: x / Appearance: x / SG: x / pH: x  Gluc: 96 mg/dL / Ketone: x  / Bili: x / Urobili: x   Blood: x / Protein: x / Nitrite: x   Leuk Esterase: x / RBC: x / WBC x   Sq Epi: x / Non Sq Epi: x / Bacteria: x        RADIOLOGY & ADDITIONAL STUDIES: < from: Xray Chest 1 View- PORTABLE-Routine (Xray Chest 1 View- PORTABLE-Routine in AM.) (10.06.23 @ 08:23) >  PROCEDURE DATE:  10/06/2023          INTERPRETATION:  CLINICAL INDICATION: 78 years  Male with eval pna.    COMPARISON: 10/5/2023    Right central line tip overlies the superior vena cava. Enteric catheter   tip is below the diaphragm.    AP view of the chest demonstrates persistent moderate right pleural   effusion and no left pleural effusion. The pulmonary vasculature is   normal.    The heart is enlarged. Sternotomy and cardiac valve prosthesis. A   leadless pacemaker and left atrial appendage clips are reidentified.    The mediastinum and robert cannot be assessed due to rotation.    Mild thoracic degenerative changes are present.    IMPRESSION:    No significant change.    Moderate right pleural effusion. Cannot exclude underlying infiltrate or   atelectasis.    Cardiomegaly.    Right central line and enteric catheter in satisfactory position.          ADVANCE DIRECTIVES:  Hcp  full code   Advanced Care Planning discussion total time spent:     Progress: remains very lethargic, wakes w/ stim., but cannot keep eyes open long, mumbles few words     Review of Systems: [ Unable to obtain due to poor mentation]    MEDICATIONS  (STANDING):  aspirin  chewable 81 milliGRAM(s) Oral daily  ceftaroline fosamil IVPB 200 milliGRAM(s) IV Intermittent once  ceftaroline fosamil IVPB      ceftaroline fosamil IVPB 200 milliGRAM(s) IV Intermittent every 8 hours  chlorhexidine 2% Cloths 1 Application(s) Topical <User Schedule>  epoetin val (PROCRIT) Injectable 44161 Unit(s) IV Push <User Schedule>  folic acid 1 milliGRAM(s) Oral daily  heparin   Injectable 5000 Unit(s) SubCutaneous every 12 hours  hydrocortisone hemorrhoidal Suppository 1 Suppository(s) Rectal two times a day  influenza  Vaccine (HIGH DOSE) 0.7 milliLiter(s) IntraMuscular once  levothyroxine 137 MICROGram(s) Oral daily  mesalamine Suppository 1000 milliGRAM(s) Rectal at bedtime  midodrine 10 milliGRAM(s) Oral every 8 hours  pantoprazole   Suspension 40 milliGRAM(s) Enteral Tube daily  polyethylene glycol 3350 17 Gram(s) Oral daily  senna 2 Tablet(s) Oral at bedtime  vancomycin  IVPB 750 milliGRAM(s) IV Intermittent <User Schedule>    MEDICATIONS  (PRN):  acetaminophen     Tablet .. 650 milliGRAM(s) Oral every 6 hours PRN Mild Pain (1 - 3)  aluminum hydroxide/magnesium hydroxide/simethicone Suspension 10 milliLiter(s) Oral every 6 hours PRN dyspepsia  sodium chloride 0.9% lock flush 10 milliLiter(s) IV Push every 1 hour PRN Pre/post blood products, medications, blood draw, and to maintain line patency      PHYSICAL EXAM:  Vital Signs Last 24 Hrs  T(C): 36.6 (09 Oct 2023 12:30), Max: 37.7 (08 Oct 2023 21:19)  T(F): 97.9 (09 Oct 2023 12:30), Max: 99.9 (08 Oct 2023 21:19)  HR: 93 (09 Oct 2023 13:07) (74 - 122)  BP: 89/52 (09 Oct 2023 13:07) (89/52 - 127/66)  BP(mean): 64 (09 Oct 2023 13:07) (64 - 80)  RR: 18 (09 Oct 2023 12:30) (18 - 19)  SpO2: 92% (09 Oct 2023 12:30) (92% - 98%)    Parameters below as of 09 Oct 2023 12:30  Patient On (Oxygen Delivery Method): room air      General:  drowsy , wakes w/ stim., mumbles,  oriented x 1      HEENT: n/c, a/t  dry mouth lips     Lungs: coarse , few upper rhonchi   CV: normal - tachy     GI: abd soft, n/t     : normal    Musculoskeletal: edematous, senile purpura    Skin: pale,     Neuro:  deficits lethargic   Oral intake ability:  oral feeding- minimal - full assist   Diet: pureed w/ thins  when awake      LABS:                          7.8    18.06 )-----------( 111      ( 09 Oct 2023 07:00 )             22.2     10-09    134<L>  |  98  |  82<H>  ----------------------------<  96  4.1   |  27  |  5.27<H>    Ca    10.1      09 Oct 2023 07:00  Phos  3.2     10-09  Mg     1.9     10-09    TPro  6.0  /  Alb  2.1<L>  /  TBili  0.8  /  DBili  x   /  AST  25  /  ALT  27  /  AlkPhos  243<H>  10-09    Urinalysis Basic - ( 09 Oct 2023 07:00 )    Color: x / Appearance: x / SG: x / pH: x  Gluc: 96 mg/dL / Ketone: x  / Bili: x / Urobili: x   Blood: x / Protein: x / Nitrite: x   Leuk Esterase: x / RBC: x / WBC x   Sq Epi: x / Non Sq Epi: x / Bacteria: x        RADIOLOGY & ADDITIONAL STUDIES: < from: Xray Chest 1 View- PORTABLE-Routine (Xray Chest 1 View- PORTABLE-Routine in AM.) (10.06.23 @ 08:23) >  PROCEDURE DATE:  10/06/2023          INTERPRETATION:  CLINICAL INDICATION: 78 years  Male with eval pna.    COMPARISON: 10/5/2023    Right central line tip overlies the superior vena cava. Enteric catheter   tip is below the diaphragm.    AP view of the chest demonstrates persistent moderate right pleural   effusion and no left pleural effusion. The pulmonary vasculature is   normal.    The heart is enlarged. Sternotomy and cardiac valve prosthesis. A   leadless pacemaker and left atrial appendage clips are reidentified.    The mediastinum and robert cannot be assessed due to rotation.    Mild thoracic degenerative changes are present.    IMPRESSION:    No significant change.    Moderate right pleural effusion. Cannot exclude underlying infiltrate or   atelectasis.    Cardiomegaly.    Right central line and enteric catheter in satisfactory position.          ADVANCE DIRECTIVES:  Hcp  full code   Advanced Care Planning discussion total time spent:

## 2023-10-10 LAB
ALBUMIN SERPL ELPH-MCNC: 2.1 G/DL — LOW (ref 3.3–5)
ALP SERPL-CCNC: 225 U/L — HIGH (ref 40–120)
ALT FLD-CCNC: 15 U/L — SIGNIFICANT CHANGE UP (ref 10–45)
ANION GAP SERPL CALC-SCNC: 9 MMOL/L — SIGNIFICANT CHANGE UP (ref 5–17)
AST SERPL-CCNC: 17 U/L — SIGNIFICANT CHANGE UP (ref 10–40)
BASOPHILS # BLD AUTO: 0.08 K/UL — SIGNIFICANT CHANGE UP (ref 0–0.2)
BASOPHILS NFR BLD AUTO: 0.4 % — SIGNIFICANT CHANGE UP (ref 0–2)
BILIRUB SERPL-MCNC: 0.8 MG/DL — SIGNIFICANT CHANGE UP (ref 0.2–1.2)
BUN SERPL-MCNC: 56 MG/DL — HIGH (ref 7–23)
CALCIUM SERPL-MCNC: 9.6 MG/DL — SIGNIFICANT CHANGE UP (ref 8.4–10.5)
CHLORIDE SERPL-SCNC: 100 MMOL/L — SIGNIFICANT CHANGE UP (ref 96–108)
CO2 SERPL-SCNC: 27 MMOL/L — SIGNIFICANT CHANGE UP (ref 22–31)
CREAT SERPL-MCNC: 4.17 MG/DL — HIGH (ref 0.5–1.3)
EGFR: 14 ML/MIN/1.73M2 — LOW
EOSINOPHIL # BLD AUTO: 0.08 K/UL — SIGNIFICANT CHANGE UP (ref 0–0.5)
EOSINOPHIL NFR BLD AUTO: 0.4 % — SIGNIFICANT CHANGE UP (ref 0–6)
GLUCOSE SERPL-MCNC: 115 MG/DL — HIGH (ref 70–99)
GRAM STN FLD: SIGNIFICANT CHANGE UP
HCT VFR BLD CALC: 23.3 % — LOW (ref 39–50)
HGB BLD-MCNC: 7.9 G/DL — LOW (ref 13–17)
IMM GRANULOCYTES NFR BLD AUTO: 1 % — HIGH (ref 0–0.9)
LYMPHOCYTES # BLD AUTO: 1.06 K/UL — SIGNIFICANT CHANGE UP (ref 1–3.3)
LYMPHOCYTES # BLD AUTO: 5.9 % — LOW (ref 13–44)
MCHC RBC-ENTMCNC: 30.6 PG — SIGNIFICANT CHANGE UP (ref 27–34)
MCHC RBC-ENTMCNC: 33.9 GM/DL — SIGNIFICANT CHANGE UP (ref 32–36)
MCV RBC AUTO: 90.3 FL — SIGNIFICANT CHANGE UP (ref 80–100)
MONOCYTES # BLD AUTO: 1.28 K/UL — HIGH (ref 0–0.9)
MONOCYTES NFR BLD AUTO: 7.1 % — SIGNIFICANT CHANGE UP (ref 2–14)
NEUTROPHILS # BLD AUTO: 15.25 K/UL — HIGH (ref 1.8–7.4)
NEUTROPHILS NFR BLD AUTO: 85.2 % — HIGH (ref 43–77)
NRBC # BLD: 0 /100 WBCS — SIGNIFICANT CHANGE UP (ref 0–0)
OB PNL STL: NEGATIVE — SIGNIFICANT CHANGE UP
PLATELET # BLD AUTO: 119 K/UL — LOW (ref 150–400)
POTASSIUM SERPL-MCNC: 4.4 MMOL/L — SIGNIFICANT CHANGE UP (ref 3.5–5.3)
POTASSIUM SERPL-SCNC: 4.4 MMOL/L — SIGNIFICANT CHANGE UP (ref 3.5–5.3)
PROT SERPL-MCNC: 6.2 G/DL — SIGNIFICANT CHANGE UP (ref 6–8.3)
RBC # BLD: 2.58 M/UL — LOW (ref 4.2–5.8)
RBC # FLD: 16 % — HIGH (ref 10.3–14.5)
SODIUM SERPL-SCNC: 136 MMOL/L — SIGNIFICANT CHANGE UP (ref 135–145)
SPECIMEN SOURCE: SIGNIFICANT CHANGE UP
VANCOMYCIN FLD-MCNC: 23.6 UG/ML — SIGNIFICANT CHANGE UP
WBC # BLD: 17.93 K/UL — HIGH (ref 3.8–10.5)
WBC # FLD AUTO: 17.93 K/UL — HIGH (ref 3.8–10.5)

## 2023-10-10 PROCEDURE — 99232 SBSQ HOSP IP/OBS MODERATE 35: CPT

## 2023-10-10 PROCEDURE — 99233 SBSQ HOSP IP/OBS HIGH 50: CPT

## 2023-10-10 PROCEDURE — 93970 EXTREMITY STUDY: CPT | Mod: 26

## 2023-10-10 RX ORDER — APIXABAN 2.5 MG/1
5 TABLET, FILM COATED ORAL
Refills: 0 | Status: DISCONTINUED | OUTPATIENT
Start: 2023-10-10 | End: 2023-10-14

## 2023-10-10 RX ORDER — DAPTOMYCIN 500 MG/10ML
600 INJECTION, POWDER, LYOPHILIZED, FOR SOLUTION INTRAVENOUS
Refills: 0 | Status: DISCONTINUED | OUTPATIENT
Start: 2023-10-10 | End: 2023-10-14

## 2023-10-10 RX ADMIN — APIXABAN 5 MILLIGRAM(S): 2.5 TABLET, FILM COATED ORAL at 18:31

## 2023-10-10 RX ADMIN — MIDODRINE HYDROCHLORIDE 10 MILLIGRAM(S): 2.5 TABLET ORAL at 13:00

## 2023-10-10 RX ADMIN — SENNA PLUS 2 TABLET(S): 8.6 TABLET ORAL at 21:10

## 2023-10-10 RX ADMIN — Medication 1 SUPPOSITORY(S): at 18:30

## 2023-10-10 RX ADMIN — Medication 81 MILLIGRAM(S): at 12:58

## 2023-10-10 RX ADMIN — CHLORHEXIDINE GLUCONATE 1 APPLICATION(S): 213 SOLUTION TOPICAL at 06:30

## 2023-10-10 RX ADMIN — Medication 1000 MILLIGRAM(S): at 21:27

## 2023-10-10 RX ADMIN — POLYETHYLENE GLYCOL 3350 17 GRAM(S): 17 POWDER, FOR SOLUTION ORAL at 12:59

## 2023-10-10 RX ADMIN — Medication 1 MILLIGRAM(S): at 12:58

## 2023-10-10 RX ADMIN — CEFTAROLINE FOSAMIL 50 MILLIGRAM(S): 600 POWDER, FOR SOLUTION INTRAVENOUS at 06:15

## 2023-10-10 RX ADMIN — DAPTOMYCIN 124 MILLIGRAM(S): 500 INJECTION, POWDER, LYOPHILIZED, FOR SOLUTION INTRAVENOUS at 18:31

## 2023-10-10 RX ADMIN — PANTOPRAZOLE SODIUM 40 MILLIGRAM(S): 20 TABLET, DELAYED RELEASE ORAL at 12:58

## 2023-10-10 RX ADMIN — Medication 1 SUPPOSITORY(S): at 06:16

## 2023-10-10 RX ADMIN — Medication 150 MICROGRAM(S): at 06:28

## 2023-10-10 RX ADMIN — CEFTAROLINE FOSAMIL 50 MILLIGRAM(S): 600 POWDER, FOR SOLUTION INTRAVENOUS at 21:09

## 2023-10-10 RX ADMIN — CEFTAROLINE FOSAMIL 50 MILLIGRAM(S): 600 POWDER, FOR SOLUTION INTRAVENOUS at 15:01

## 2023-10-10 NOTE — PROGRESS NOTE ADULT - ATTENDING COMMENTS
Patient seen and examined at bedside. No acute overnight events.  He is awake, alert, oriented x 1. Poor historian.  Bilateral UE edema 2/2 anasarca, duplex negative. Albumin 2.1  Blood culture from 10/9 still positive.   Discussed with ID and pulmonary, recommended ERENDIRA to confirm endocarditis.   Discussed with Dr. Bass - she will discuss with family regarding risks/benefits.   Vancomycin changed to daptomycin and continued on ceftaroline.  Repeat blood cultures ordered for tomorrow. Baseline CPK  CT chest/abd/pelvis to eval for other source.  H/H remains stable. Thrombocytopenia continues to improve.   CHADsVASc 5, will resume eliquis.     Overall prognosis is guarded. GOC ongoing. Palliative on board.   Spoke with Surekha (HCP) - see separate GOC note

## 2023-10-10 NOTE — PROGRESS NOTE ADULT - PROBLEM SELECTOR PLAN 1
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  Awaiting repeat ERENDIRA to address the length of antibiotic coverage.  Hematologic profile reactive to ongoing infection; reactive leukocytosis and recovering thrombocytopenia.  Patient remains anemic; transfuse PRBC PRN.

## 2023-10-10 NOTE — GOALS OF CARE CONVERSATION - ADVANCED CARE PLANNING - CONVERSATION DETAILS
I had a lengthy discussion with patient's significant other (HCP Surekha [phone]) over the phone regarding goals of care. Patient has had recurrent hospitalizations this year ( MSSA bacteremia 3/2023 s/p PPM explant and micra PPM placement, started on HD for ESRD, recent proctitis 8/2023 and clostridium bacteremia w/ ESBL in urine) and has been residing at [facility] since 5/2023. As per Surekha, patient was AAO x 3 prior to this hospitalization but has had significant functional decline since beginning of the year.  We discussed his current complicated hospital course and high grade MRSA bacteremia with persistent positive blood cultures despite antibiotics. Endocarditis is suspected however he is not a candidate for valve replacement for source control.   Given his age, multiple comorbidities including ESRD requiring HD, fragility and severe infection, his overall prognosis is very poor.  We discussed aggressive interventions such as chest compression, resuscitation, intubation/mechanical ventilation, given all of his risk factors as stated, he is unlikely to have a meaningful recovery.   Surekha expressed understanding, states that she will consider all of the above and will be coming up to see him tomorrow.   Will further discuss MOLST tomorrow. I had a lengthy discussion with patient's significant other (HCP Surekah [phone]) over the phone regarding goals of care. Patient has had recurrent hospitalizations this year ( MSSA bacteremia 3/2023 s/p PPM explant and micra PPM placement, started on HD for ESRD, recent proctitis 8/2023 and clostridium bacteremia w/ ESBL in urine) and has been residing at [facility] since 5/2023. As per Surekha, patient was AAO x 3 prior to this hospitalization but has had significant functional decline since beginning of the year.  We discussed his current complicated hospital course and high grade MRSA bacteremia with persistent positive blood cultures despite antibiotics. Endocarditis is suspected however he is not a candidate for valve replacement for source control.   Given his age, multiple comorbidities including ESRD requiring HD, fragility and severe infection, his overall prognosis is very poor.  We discussed aggressive interventions such as chest compression, resuscitation, intubation/mechanical ventilation, given all of his risk factors as stated, he is unlikely to have a meaningful recovery.   Surekha expressed understanding, states that she will consider all of the above and will be coming up to see him tomorrow.   Will further discuss MOLST tomorrow. I had a lengthy discussion with patient's significant other (HCP Surekha [phone]) over the phone regarding goals of care. Patient has had recurrent hospitalizations this year ( MSSA bacteremia 3/2023 s/p PPM explant and micra PPM placement, started on HD for ESRD, recent proctitis 8/2023 and clostridium bacteremia w/ ESBL in urine) and has been residing at [facility] since 5/2023. As per Surekha, patient was AAO x 3 prior to this hospitalization but has had significant functional decline since beginning of the year.  We discussed his current complicated hospital course and high grade MRSA bacteremia with persistent positive blood cultures despite antibiotics. Endocarditis is suspected however he is not a candidate for valve replacement for source control.   Given his age, multiple comorbidities including ESRD requiring HD, fragility and severe infection, his overall prognosis is very poor.  We discussed aggressive interventions such as chest compression, resuscitation, intubation/mechanical ventilation, given all of his risk factors as stated, he is unlikely to have a meaningful recovery.   Surekha expressed understanding, states that she will consider all of the above and will be coming up to see him tomorrow.   Will further discuss MOLST tomorrow.

## 2023-10-10 NOTE — GOALS OF CARE CONVERSATION - ADVANCED CARE PLANNING - DOES PATIENT HAVE A SURROGATE
No Cellcept Pregnancy And Lactation Text: This medication is Pregnancy Category D and isn't considered safe during pregnancy. It is unknown if this medication is excreted in breast milk. Odomzo Counseling- I discussed with the patient the risks of Odomzo including but not limited to nausea, vomiting, diarrhea, constipation, weight loss, changes in the sense of taste, decreased appetite, muscle spasms, and hair loss. The patient verbalized understanding of the proper use and possible adverse effects of Odomzo. All of the patient's questions and concerns were addressed. Siliq Counseling:  I discussed with the patient the risks of Siliq including but not limited to new or worsening depression, suicidal thoughts and behavior, immunosuppression, malignancy, posterior leukoencephalopathy syndrome, and serious infections. The patient understands that monitoring is required including a PPD at baseline and must alert us or the primary physician if symptoms of infection or other concerning signs are noted. There is also a special program designed to monitor depression which is required with Siliq. Topical Retinoid counseling:  Patient advised to apply a pea-sized amount only at bedtime and wait 30 minutes after washing their face before applying. If too drying, patient may add a non-comedogenic moisturizer. The patient verbalized understanding of the proper use and possible adverse effects of retinoids. All of the patient's questions and concerns were addressed. Arava Pregnancy And Lactation Text: This medication is Pregnancy Category X and is absolutely contraindicated during pregnancy. It is unknown if it is excreted in breast milk. Terbinafine Counseling: Patient counseling regarding adverse effects of terbinafine including but not limited to headache, diarrhea, rash, upset stomach, liver function test abnormalities, itching, taste/smell disturbance, nausea, abdominal pain, and flatulence. There is a rare possibility of liver failure that can occur when taking terbinafine. The patient understands that a baseline LFT and kidney function test may be required. The patient verbalized understanding of the proper use and possible adverse effects of terbinafine. All of the patient's questions and concerns were addressed. Tranexamic Acid Pregnancy And Lactation Text: It is unknown if this medication is safe during pregnancy or breast feeding. Terbinafine Pregnancy And Lactation Text: This medication is Pregnancy Category B and is considered safe during pregnancy. It is also excreted in breast milk and breast feeding isn't recommended. Cimzia Pregnancy And Lactation Text: This medication crosses the placenta but can be considered safe in certain situations. Cimzia may be excreted in breast milk. Siliq Pregnancy And Lactation Text: The risk during pregnancy and breastfeeding is uncertain with this medication. High Dose Vitamin A Pregnancy And Lactation Text: High dose vitamin A therapy is contraindicated during pregnancy and breast feeding. Minocycline Pregnancy And Lactation Text: This medication is Pregnancy Category D and not consider safe during pregnancy. It is also excreted in breast milk. Hydroquinone Counseling:  Patient advised that medication may result in skin irritation, lightening (hypopigmentation), dryness, and burning. In the event of skin irritation, the patient was advised to reduce the amount of the drug applied or use it less frequently. Rarely, spots that are treated with hydroquinone can become darker (pseudoochronosis). Should this occur, patient instructed to stop medication and call the office. The patient verbalized understanding of the proper use and possible adverse effects of hydroquinone. All of the patient's questions and concerns were addressed. Azithromycin Counseling:  I discussed with the patient the risks of azithromycin including but not limited to GI upset, allergic reaction, drug rash, diarrhea, and yeast infections. Quinolones Counseling:  I discussed with the patient the risks of fluoroquinolones including but not limited to GI upset, allergic reaction, drug rash, diarrhea, dizziness, photosensitivity, yeast infections, liver function test abnormalities, tendonitis/tendon rupture. Cyclophosphamide Counseling:  I discussed with the patient the risks of cyclophosphamide including but not limited to hair loss, hormonal abnormalities, decreased fertility, abdominal pain, diarrhea, nausea and vomiting, bone marrow suppression and infection. The patient understands that monitoring is required while taking this medication. Topical Retinoid Pregnancy And Lactation Text: This medication is Pregnancy Category C. It is unknown if this medication is excreted in breast milk. Clofazimine Counseling:  I discussed with the patient the risks of clofazimine including but not limited to skin and eye pigmentation, liver damage, nausea/vomiting, gastrointestinal bleeding and allergy. Dapsone Counseling: I discussed with the patient the risks of dapsone including but not limited to hemolytic anemia, agranulocytosis, rashes, methemoglobinemia, kidney failure, peripheral neuropathy, headaches, GI upset, and liver toxicity. Patients who start dapsone require monitoring including baseline LFTs and weekly CBCs for the first month, then every month thereafter. The patient verbalized understanding of the proper use and possible adverse effects of dapsone. All of the patient's questions and concerns were addressed. Hydroquinone Pregnancy And Lactation Text: This medication has not been assigned a Pregnancy Risk Category but animal studies failed to show danger with the topical medication. It is unknown if the medication is excreted in breast milk. Tazorac Counseling:  Patient advised that medication is irritating and drying. Patient may need to apply sparingly and wash off after an hour before eventually leaving it on overnight. The patient verbalized understanding of the proper use and possible adverse effects of tazorac. All of the patient's questions and concerns were addressed. Simponi Counseling:  I discussed with the patient the risks of golimumab including but not limited to myelosuppression, immunosuppression, autoimmune hepatitis, demyelinating diseases, lymphoma, and serious infections. The patient understands that monitoring is required including a PPD at baseline and must alert us or the primary physician if symptoms of infection or other concerning signs are noted. Cosentyx Counseling:  I discussed with the patient the risks of Cosentyx including but not limited to worsening of Crohn's disease, immunosuppression, allergic reactions and infections. The patient understands that monitoring is required including a PPD at baseline and must alert us or the primary physician if symptoms of infection or other concerning signs are noted. Dapsone Pregnancy And Lactation Text: This medication is Pregnancy Category C and is not considered safe during pregnancy or breast feeding. Valtrex Counseling: I discussed with the patient the risks of valacyclovir including but not limited to kidney damage, nausea, vomiting and severe allergy. The patient understands that if the infection seems to be worsening or is not improving, they are to call. Stelara Counseling:  I discussed with the patient the risks of ustekinumab including but not limited to immunosuppression, malignancy, posterior leukoencephalopathy syndrome, and serious infections. The patient understands that monitoring is required including a PPD at baseline and must alert us or the primary physician if symptoms of infection or other concerning signs are noted. Otezla Counseling: [place] Counseling: The side effects of [place] were discussed with the patient, including but not limited to worsening or new depression, weight loss, diarrhea, nausea, upper respiratory tract infection, and headache. Patient instructed to call the office should any adverse effect occur. The patient verbalized understanding of the proper use and possible adverse effects of Otezla. All the patient's questions and concerns were addressed. Valtrex Pregnancy And Lactation Text: this medication is Pregnancy Category B and is considered safe during pregnancy. This medication is not directly found in breast milk but it's metabolite acyclovir is present. Azithromycin Pregnancy And Lactation Text: This medication is considered safe during pregnancy and is also secreted in breast milk. Cyclophosphamide Pregnancy And Lactation Text: This medication is Pregnancy Category D and it isn't considered safe during pregnancy. This medication is excreted in breast milk. Erivedge Counseling- I discussed with the patient the risks of Erivedge including but not limited to nausea, vomiting, diarrhea, constipation, weight loss, changes in the sense of taste, decreased appetite, muscle spasms, and hair loss. The patient verbalized understanding of the proper use and possible adverse effects of Erivedge. All of the patient's questions and concerns were addressed. Quinolones Pregnancy And Lactation Text: This medication is Pregnancy Category C and it isn't know if it is safe during pregnancy. It is also excreted in breast milk. Imiquimod Counseling:  I discussed with the patient the risks of imiquimod including but not limited to erythema, scaling, itching, weeping, crusting, and pain. Patient understands that the inflammatory response to imiquimod is variable from person to person and was educated regarded proper titration schedule. If flu-like symptoms develop, patient knows to discontinue the medication and contact us. Cimetidine Counseling:  I discussed with the patient the risks of Cimetidine including but not limited to gynecomastia, headache, diarrhea, nausea, drowsiness, arrhythmias, pancreatitis, skin rashes, psychosis, bone marrow suppression and kidney toxicity. Use Enhanced Medication Counseling?: No Stelara Pregnancy And Lactation Text: This medication is Pregnancy Category B and is considered safe during pregnancy. It is unknown if this medication is excreted in breast milk. Cyclosporine Counseling:  I discussed with the patient the risks of cyclosporine including but not limited to hypertension, gingival hyperplasia,myelosuppression, immunosuppression, liver damage, kidney damage, neurotoxicity, lymphoma, and serious infections. The patient understands that monitoring is required including baseline blood pressure, CBC, CMP, lipid panel and uric acid, and then 1-2 times monthly CMP and blood pressure. Bactrim Counseling:  I discussed with the patient the risks of sulfa antibiotics including but not limited to GI upset, allergic reaction, drug rash, diarrhea, dizziness, photosensitivity, and yeast infections. Rarely, more serious reactions can occur including but not limited to aplastic anemia, agranulocytosis, methemoglobinemia, blood dyscrasias, liver or kidney failure, lung infiltrates or desquamative/blistering drug rashes. Rifampin Counseling: I discussed with the patient the risks of rifampin including but not limited to liver damage, kidney damage, red-orange body fluids, nausea/vomiting and severe allergy. Tazorac Pregnancy And Lactation Text: This medication is not safe during pregnancy. It is unknown if this medication is excreted in breast milk. Otezla Pregnancy And Lactation Text: This medication is Pregnancy Category C and it isn't known if it is safe during pregnancy. It is unknown if it is excreted in breast milk. Topical Clindamycin Counseling: Patient counseled that this medication may cause skin irritation or allergic reactions. In the event of skin irritation, the patient was advised to reduce the amount of the drug applied or use it less frequently. The patient verbalized understanding of the proper use and possible adverse effects of clindamycin. All of the patient's questions and concerns were addressed. Dupixent Counseling: I discussed with the patient the risks of dupilumab including but not limited to eye infection and irritation, cold sores, injection site reactions, worsening of asthma, allergic reactions and increased risk of parasitic infection. Live vaccines should be avoided while taking dupilumab. Dupilumab will also interact with certain medications such as warfarin and cyclosporine. The patient understands that monitoring is required and they must alert us or the primary physician if symptoms of infection or other concerning signs are noted. Skyrizi Counseling: I discussed with the patient the risks of risankizumab-rzaa including but not limited to immunosuppression, and serious infections. The patient understands that monitoring is required including a PPD at baseline and must alert us or the primary physician if symptoms of infection or other concerning signs are noted. Taltz Counseling: I discussed with the patient the risks of ixekizumab including but not limited to immunosuppression, serious infections, worsening of inflammatory bowel disease and drug reactions. The patient understands that monitoring is required including a PPD at baseline and must alert us or the primary physician if symptoms of infection or other concerning signs are noted. Oxybutynin Counseling:  I discussed with the patient the risks of oxybutynin including but not limited to skin rash, drowsiness, dry mouth, difficulty urinating, and blurred vision. Bactrim Pregnancy And Lactation Text: This medication is Pregnancy Category D and is known to cause fetal risk. It is also excreted in breast milk. Rifampin Pregnancy And Lactation Text: This medication is Pregnancy Category C and it isn't know if it is safe during pregnancy. It is also excreted in breast milk and should not be used if you are breast feeding. Cyclosporine Pregnancy And Lactation Text: This medication is Pregnancy Category C and it isn't know if it is safe during pregnancy. This medication is excreted in breast milk. Finasteride Counseling:  I discussed with the patient the risks of use of finasteride including but not limited to decreased libido, decreased ejaculate volume, gynecomastia, and depression. Women should not handle medication. All of the patient's questions and concerns were addressed. Doxepin Counseling:  Patient advised that the medication is sedating and not to drive a car after taking this medication. Patient informed of potential adverse effects including but not limited to dry mouth, urinary retention, and blurry vision. The patient verbalized understanding of the proper use and possible adverse effects of doxepin. All of the patient's questions and concerns were addressed. Dupixent Pregnancy And Lactation Text: This medication likely crosses the placenta but the risk for the fetus is uncertain. This medication is excreted in breast milk. Minoxidil Counseling: Minoxidil is a topical medication which can increase blood flow where it is applied. It is uncertain how this medication increases hair growth. Side effects are uncommon and include stinging and allergic reactions. Topical Clindamycin Pregnancy And Lactation Text: This medication is Pregnancy Category B and is considered safe during pregnancy. It is unknown if it is excreted in breast milk. Doxepin Pregnancy And Lactation Text: This medication is Pregnancy Category C and it isn't known if it is safe during pregnancy. It is also excreted in breast milk and breast feeding isn't recommended. Methotrexate Counseling:  Patient counseled regarding adverse effects of methotrexate including but not limited to nausea, vomiting, abnormalities in liver function tests. Patients may develop mouth sores, rash, diarrhea, and abnormalities in blood counts. The patient understands that monitoring is required including LFT's and blood counts. There is a rare possibility of scarring of the liver and lung problems that can occur when taking methotrexate. Persistent nausea, loss of appetite, pale stools, dark urine, cough, and shortness of breath should be reported immediately. Patient advised to discontinue methotrexate treatment at least three months before attempting to become pregnant. I discussed the need for folate supplements while taking methotrexate. These supplements can decrease side effects during methotrexate treatment. The patient verbalized understanding of the proper use and possible adverse effects of methotrexate. All of the patient's questions and concerns were addressed. Benzoyl Peroxide Counseling: Patient counseled that medicine may cause skin irritation and bleach clothing. In the event of skin irritation, the patient was advised to reduce the amount of the drug applied or use it less frequently. The patient verbalized understanding of the proper use and possible adverse effects of benzoyl peroxide. All of the patient's questions and concerns were addressed. Cephalexin Counseling: I counseled the patient regarding use of cephalexin as an antibiotic for prophylactic and/or therapeutic purposes. Cephalexin (commonly prescribed under brand name Keflex) is a cephalosporin antibiotic which is active against numerous classes of bacteria, including most skin bacteria. Side effects may include nausea, diarrhea, gastrointestinal upset, rash, hives, yeast infections, and in rare cases, hepatitis, kidney disease, seizures, fever, confusion, neurologic symptoms, and others. Patients with severe allergies to penicillin medications are cautioned that there is about a 10% incidence of cross-reactivity with cephalosporins. When possible, patients with penicillin allergies should use alternatives to cephalosporins for antibiotic therapy. Tetracycline Counseling: Patient counseled regarding possible photosensitivity and increased risk for sunburn. Patient instructed to avoid sunlight, if possible. When exposed to sunlight, patients should wear protective clothing, sunglasses, and sunscreen. The patient was instructed to call the office immediately if the following severe adverse effects occur:  hearing changes, easy bruising/bleeding, severe headache, or vision changes. The patient verbalized understanding of the proper use and possible adverse effects of tetracycline. All of the patient's questions and concerns were addressed. Patient understands to avoid pregnancy while on therapy due to potential birth defects. Enbrel Counseling:  I discussed with the patient the risks of etanercept including but not limited to myelosuppression, immunosuppression, autoimmune hepatitis, demyelinating diseases, lymphoma, and infections. The patient understands that monitoring is required including a PPD at baseline and must alert us or the primary physician if symptoms of infection or other concerning signs are noted. Topical Sulfur Applications Counseling: Topical Sulfur Counseling: Patient counseled that this medication may cause skin irritation or allergic reactions. In the event of skin irritation, the patient was advised to reduce the amount of the drug applied or use it less frequently. The patient verbalized understanding of the proper use and possible adverse effects of topical sulfur application. All of the patient's questions and concerns were addressed. Tremfya Counseling: I discussed with the patient the risks of guselkumab including but not limited to immunosuppression, serious infections, worsening of inflammatory bowel disease and drug reactions. The patient understands that monitoring is required including a PPD at baseline and must alert us or the primary physician if symptoms of infection or other concerning signs are noted. Finasteride Pregnancy And Lactation Text: This medication is absolutely contraindicated during pregnancy. It is unknown if it is excreted in breast milk. Cephalexin Pregnancy And Lactation Text: This medication is Pregnancy Category B and considered safe during pregnancy. It is also excreted in breast milk but can be used safely for shorter doses. Mirvaso Counseling: Ronald Kaylee is a topical medication which can decrease superficial blood flow where applied. Side effects are uncommon and include stinging, redness and allergic reactions. Propranolol Counseling:  I discussed with the patient the risks of propranolol including but not limited to low heart rate, low blood pressure, low blood sugar, restlessness and increased cold sensitivity. They should call the office if they experience any of these side effects. Methotrexate Pregnancy And Lactation Text: This medication is Pregnancy Category X and is known to cause fetal harm. This medication is excreted in breast milk. Benzoyl Peroxide Pregnancy And Lactation Text: This medication is Pregnancy Category C. It is unknown if benzoyl peroxide is excreted in breast milk. Detail Level: Zone Topical Sulfur Applications Pregnancy And Lactation Text: This medication is Pregnancy Category C and has an unknown safety profile during pregnancy. It is unknown if this topical medication is excreted in breast milk. Gabapentin Counseling: I discussed with the patient the risks of gabapentin including but not limited to dizziness, somnolence, fatigue and ataxia. Propranolol Pregnancy And Lactation Text: This medication is Pregnancy Category C and it isn't known if it is safe during pregnancy. It is excreted in breast milk. Hydroxyzine Counseling: Patient advised that the medication is sedating and not to drive a car after taking this medication. Patient informed of potential adverse effects including but not limited to dry mouth, urinary retention, and blurry vision. The patient verbalized understanding of the proper use and possible adverse effects of hydroxyzine. All of the patient's questions and concerns were addressed. Prednisone Counseling:  I discussed with the patient the risks of prolonged use of prednisone including but not limited to weight gain, insomnia, osteoporosis, mood changes, diabetes, susceptibility to infection, glaucoma and high blood pressure. In cases where prednisone use is prolonged, patients should be monitored with blood pressure checks, serum glucose levels and an eye exam.  Additionally, the patient may need to be placed on GI prophylaxis, PCP prophylaxis, and calcium and vitamin D supplementation and/or a bisphosphonate. The patient verbalized understanding of the proper use and the possible adverse effects of prednisone. All of the patient's questions and concerns were addressed. Carac Counseling:  I discussed with the patient the risks of Carac including but not limited to erythema, scaling, itching, weeping, crusting, and pain. Humira Counseling:  I discussed with the patient the risks of adalimumab including but not limited to myelosuppression, immunosuppression, autoimmune hepatitis, demyelinating diseases, lymphoma, and serious infections. The patient understands that monitoring is required including a PPD at baseline and must alert us or the primary physician if symptoms of infection or other concerning signs are noted. Carac Pregnancy And Lactation Text: This medication is Pregnancy Category X and contraindicated in pregnancy and in women who may become pregnant. It is unknown if this medication is excreted in breast milk. Mirvaso Pregnancy And Lactation Text: This medication has not been assigned a Pregnancy Risk Category. It is unknown if the medication is excreted in breast milk. Wartpeel Counseling:  I discussed with the patient the risks of Wartpeel including but not limited to erythema, scaling, itching, weeping, crusting, and pain. Clindamycin Counseling: I counseled the patient regarding use of clindamycin as an antibiotic for prophylactic and/or therapeutic purposes. Clindamycin is active against numerous classes of bacteria, including skin bacteria. Side effects may include nausea, diarrhea, gastrointestinal upset, rash, hives, yeast infections, and in rare cases, colitis. Hydroxyzine Pregnancy And Lactation Text: This medication is not safe during pregnancy and should not be taken. It is also excreted in breast milk and breast feeding isn't recommended. Gabapentin Pregnancy And Lactation Text: This medication is Pregnancy Category C and isn't considered safe during pregnancy. It is excreted in breast milk. Clindamycin Pregnancy And Lactation Text: This medication can be used in pregnancy if certain situations. Clindamycin is also present in breast milk. Picato Counseling:  I discussed with the patient the risks of Picato including but not limited to erythema, scaling, itching, weeping, crusting, and pain. Fluconazole Counseling:  Patient counseled regarding adverse effects of fluconazole including but not limited to headache, diarrhea, nausea, upset stomach, liver function test abnormalities, taste disturbance, and stomach pain. There is a rare possibility of liver failure that can occur when taking fluconazole. The patient understands that monitoring of LFTs and kidney function test may be required, especially at baseline. The patient verbalized understanding of the proper use and possible adverse effects of fluconazole. All of the patient's questions and concerns were addressed. Xeljanz Counseling: Shabana Abide Counseling: I discussed with the patient the risks of Lauree Abide therapy including increased risk of infection, liver issues, headache, diarrhea, or cold symptoms. Live vaccines should be avoided. They were instructed to call if they have any problems. Birth Control Pills Counseling: Birth Control Pill Counseling: I discussed with the patient the potential side effects of OCPs including but not limited to increased risk of stroke, heart attack, thrombophlebitis, deep venous thrombosis, hepatic adenomas, breast changes, GI upset, headaches, and depression. The patient verbalized understanding of the proper use and possible adverse effects of OCPs. All of the patient's questions and concerns were addressed. Birth Control Pills Pregnancy And Lactation Text: This medication should be avoided if pregnant and for the first 30 days post-partum. Glycopyrrolate Counseling:  I discussed with the patient the risks of glycopyrrolate including but not limited to skin rash, drowsiness, dry mouth, difficulty urinating, and blurred vision. Xelloliz Pregnancy And Lactation Text: This medication is Pregnancy Category D and is not considered safe during pregnancy. The risk during breast feeding is also uncertain. 5-Fu Counseling: 5-Fluorouracil Counseling:  I discussed with the patient the risks of 5-fluorouracil including but not limited to erythema, scaling, itching, weeping, crusting, and pain. Glycopyrrolate Pregnancy And Lactation Text: This medication is Pregnancy Category B and is considered safe during pregnancy. It is unknown if it is excreted breast milk. Ilumya Counseling: I discussed with the patient the risks of tildrakizumab including but not limited to immunosuppression, malignancy, posterior leukoencephalopathy syndrome, and serious infections. The patient understands that monitoring is required including a PPD at baseline and must alert us or the primary physician if symptoms of infection or other concerning signs are noted. Spironolactone Counseling: Patient advised regarding risks of diarrhea, abdominal pain, hyperkalemia, birth defects (for female patients), liver toxicity and renal toxicity. The patient may need blood work to monitor liver and kidney function and potassium levels while on therapy. The patient verbalized understanding of the proper use and possible adverse effects of spironolactone. All of the patient's questions and concerns were addressed. Doxycycline Counseling:  Patient counseled regarding possible photosensitivity and increased risk for sunburn. Patient instructed to avoid sunlight, if possible. When exposed to sunlight, patients should wear protective clothing, sunglasses, and sunscreen. The patient was instructed to call the office immediately if the following severe adverse effects occur:  hearing changes, easy bruising/bleeding, severe headache, or vision changes. The patient verbalized understanding of the proper use and possible adverse effects of doxycycline. All of the patient's questions and concerns were addressed. Zyclara Counseling:  I discussed with the patient the risks of imiquimod including but not limited to erythema, scaling, itching, weeping, crusting, and pain. Patient understands that the inflammatory response to imiquimod is variable from person to person and was educated regarded proper titration schedule. If flu-like symptoms develop, patient knows to discontinue the medication and contact us. Albendazole Counseling:  I discussed with the patient the risks of albendazole including but not limited to cytopenia, kidney damage, nausea/vomiting and severe allergy. The patient understands that this medication is being used in an off-label manner. Albendazole Pregnancy And Lactation Text: This medication is Pregnancy Category C and it isn't known if it is safe during pregnancy. It is also excreted in breast milk. Xolair Counseling:  Patient informed of potential adverse effects including but not limited to fever, muscle aches, rash and allergic reactions. The patient verbalized understanding of the proper use and possible adverse effects of Xolair. All of the patient's questions and concerns were addressed. Acitretin Counseling:  I discussed with the patient the risks of acitretin including but not limited to hair loss, dry lips/skin/eyes, liver damage, hyperlipidemia, depression/suicidal ideation, photosensitivity. Serious rare side effects can include but are not limited to pancreatitis, pseudotumor cerebri, bony changes, clot formation/stroke/heart attack. Patient understands that alcohol is contraindicated since it can result in liver toxicity and significantly prolong the elimination of the drug by many years. Protopic Counseling: Patient may experience a mild burning sensation during topical application. Protopic is not approved in children less than 3years of age. There have been case reports of hematologic and skin malignancies in patients using topical calcineurin inhibitors although causality is questionable. Doxycycline Pregnancy And Lactation Text: This medication is Pregnancy Category D and not consider safe during pregnancy. It is also excreted in breast milk but is considered safe for shorter treatment courses. Griseofulvin Counseling:  I discussed with the patient the risks of griseofulvin including but not limited to photosensitivity, cytopenia, liver damage, nausea/vomiting and severe allergy. The patient understands that this medication is best absorbed when taken with a fatty meal (e.g., ice cream or french fries). Drysol Counseling:  I discussed with the patient the risks of drysol/aluminum chloride including but not limited to skin rash, itching, irritation, burning. Hydroxychloroquine Counseling:  I discussed with the patient that a baseline ophthalmologic exam is needed at the start of therapy and every year thereafter while on therapy. A CBC may also be warranted for monitoring. The side effects of this medication were discussed with the patient, including but not limited to agranulocytosis, aplastic anemia, seizures, rashes, retinopathy, and liver toxicity. Patient instructed to call the office should any adverse effect occur. The patient verbalized understanding of the proper use and possible adverse effects of Plaquenil. All the patient's questions and concerns were addressed. Acitretin Pregnancy And Lactation Text: This medication is Pregnancy Category X and should not be given to women who are pregnant or may become pregnant in the future. This medication is excreted in breast milk. Protopic Pregnancy And Lactation Text: This medication is Pregnancy Category C. It is unknown if this medication is excreted in breast milk when applied topically. Hydroxychloroquine Pregnancy And Lactation Text: This medication has been shown to cause fetal harm but it isn't assigned a Pregnancy Risk Category. There are small amounts excreted in breast milk. Spironolactone Pregnancy And Lactation Text: This medication can cause feminization of the male fetus and should be avoided during pregnancy. The active metabolite is also found in breast milk. Griseofulvin Pregnancy And Lactation Text: This medication is Pregnancy Category X and is known to cause serious birth defects. It is unknown if this medication is excreted in breast milk but breast feeding should be avoided. Ivermectin Counseling:  Patient instructed to take medication on an empty stomach with a full glass of water. Patient informed of potential adverse effects including but not limited to nausea, diarrhea, dizziness, itching, and swelling of the extremities or lymph nodes. The patient verbalized understanding of the proper use and possible adverse effects of ivermectin. All of the patient's questions and concerns were addressed. Xolair Pregnancy And Lactation Text: This medication is Pregnancy Category B and is considered safe during pregnancy. This medication is excreted in breast milk. SSKI Counseling:  I discussed with the patient the risks of SSKI including but not limited to thyroid abnormalities, metallic taste, GI upset, fever, headache, acne, arthralgias, paraesthesias, lymphadenopathy, easy bleeding, arrhythmias, and allergic reaction. Erythromycin Counseling:  I discussed with the patient the risks of erythromycin including but not limited to GI upset, allergic reaction, drug rash, diarrhea, increase in liver enzymes, and yeast infections. Bexarotene Counseling:  I discussed with the patient the risks of bexarotene including but not limited to hair loss, dry lips/skin/eyes, liver abnormalities, hyperlipidemia, pancreatitis, depression/suicidal ideation, photosensitivity, drug rash/allergic reactions, hypothyroidism, anemia, leukopenia, infection, cataracts, and teratogenicity. Patient understands that they will need regular blood tests to check lipid profile, liver function tests, white blood cell count, thyroid function tests and pregnancy test if applicable. Erythromycin Pregnancy And Lactation Text: This medication is Pregnancy Category B and is considered safe during pregnancy. It is also excreted in breast milk. Drysol Pregnancy And Lactation Text: This medication is considered safe during pregnancy and breast feeding. Rhofade Counseling: Rhofade is a topical medication which can decrease superficial blood flow where applied. Side effects are uncommon and include stinging, redness and allergic reactions. Itraconazole Counseling:  I discussed with the patient the risks of itraconazole including but not limited to liver damage, nausea/vomiting, neuropathy, and severe allergy. The patient understands that this medication is best absorbed when taken with acidic beverages such as non-diet cola or ginger ale. The patient understands that monitoring is required including baseline LFTs and repeat LFTs at intervals. The patient understands that they are to contact us or the primary physician if concerning signs are noted. Infliximab Counseling:  I discussed with the patient the risks of infliximab including but not limited to myelosuppression, immunosuppression, autoimmune hepatitis, demyelinating diseases, lymphoma, and serious infections. The patient understands that monitoring is required including a PPD at baseline and must alert us or the primary physician if symptoms of infection or other concerning signs are noted. Elidel Counseling: Patient may experience a mild burning sensation during topical application. Elidel is not approved in children less than 3years of age. There have been case reports of hematologic and skin malignancies in patients using topical calcineurin inhibitors although causality is questionable. Azathioprine Counseling:  I discussed with the patient the risks of azathioprine including but not limited to myelosuppression, immunosuppression, hepatotoxicity, lymphoma, and infections. The patient understands that monitoring is required including baseline LFTs, Creatinine, possible TPMP genotyping and weekly CBCs for the first month and then every 2 weeks thereafter. The patient verbalized understanding of the proper use and possible adverse effects of azathioprine. All of the patient's questions and concerns were addressed. Opioid Counseling: I discussed with the patient the potential side effects of opioids including but not limited to addiction, altered mental status, and depression. I stressed avoiding alcohol, benzodiazepines, muscle relaxants and sleep aids unless specifically okayed by a physician. The patient verbalized understanding of the proper use and possible adverse effects of opioids. All of the patient's questions and concerns were addressed. They were instructed to flush the remaining pills down the toilet if they did not need them for pain. Bexarotene Pregnancy And Lactation Text: This medication is Pregnancy Category X and should not be given to women who are pregnant or may become pregnant. This medication should not be used if you are breast feeding. Niacinamide Counseling: I recommended taking niacin or niacinamide, also know as vitamin B3, twice daily. Recent evidence suggests that taking vitamin B3 (500 mg twice daily) can reduce the risk of actinic keratoses and non-melanoma skin cancers. Side effects of vitamin B3 include flushing and headache. Sski Pregnancy And Lactation Text: This medication is Pregnancy Category D and isn't considered safe during pregnancy. It is excreted in breast milk. Opioid Pregnancy And Lactation Text: These medications can lead to premature delivery and should be avoided during pregnancy. These medications are also present in breast milk in small amounts. Metronidazole Counseling:  I discussed with the patient the risks of metronidazole including but not limited to seizures, nausea/vomiting, a metallic taste in the mouth, nausea/vomiting and severe allergy. Niacinamide Pregnancy And Lactation Text: These medications are considered safe during pregnancy. Thalidomide Counseling: I discussed with the patient the risks of thalidomide including but not limited to birth defects, anxiety, weakness, chest pain, dizziness, cough and severe allergy. Isotretinoin Counseling: Patient should get monthly blood tests, not donate blood, not drive at night if vision affected, not share medication, and not undergo elective surgery for 6 months after tx completed. Side effects reviewed, pt to contact office should one occur. Nsaids Counseling: NSAID Counseling: I discussed with the patient that NSAIDs should be taken with food. Prolonged use of NSAIDs can result in the development of stomach ulcers. Patient advised to stop taking NSAIDs if abdominal pain occurs. The patient verbalized understanding of the proper use and possible adverse effects of NSAIDs. All of the patient's questions and concerns were addressed. Metronidazole Pregnancy And Lactation Text: This medication is Pregnancy Category B and considered safe during pregnancy. It is also excreted in breast milk. Solaraze Counseling:  I discussed with the patient the risks of Solaraze including but not limited to erythema, scaling, itching, weeping, crusting, and pain. Ketoconazole Counseling:   Patient counseled regarding improving absorption with orange juice. Adverse effects include but are not limited to breast enlargement, headache, diarrhea, nausea, upset stomach, liver function test abnormalities, taste disturbance, and stomach pain. There is a rare possibility of liver failure that can occur when taking ketoconazole. The patient understands that monitoring of LFTs may be required, especially at baseline. The patient verbalized understanding of the proper use and possible adverse effects of ketoconazole. All of the patient's questions and concerns were addressed. Rituxan Counseling:  I discussed with the patient the risks of Rituxan infusions. Side effects can include infusion reactions, severe drug rashes including mucocutaneous reactions, reactivation of latent hepatitis and other infections and rarely progressive multifocal leukoencephalopathy. All of the patient's questions and concerns were addressed. Ketoconazole Pregnancy And Lactation Text: This medication is Pregnancy Category C and it isn't know if it is safe during pregnancy. It is also excreted in breast milk and breast feeding isn't recommended. Rituxan Pregnancy And Lactation Text: This medication is Pregnancy Category C and it isn't know if it is safe during pregnancy. It is unknown if this medication is excreted in breast milk but similar antibodies are known to be excreted. Eucrisa Counseling: Patient may experience a mild burning sensation during topical application. Cheral Coon is not approved in children less than 3years of age. Solaraze Pregnancy And Lactation Text: This medication is Pregnancy Category B and is considered safe. There is some data to suggest avoiding during the third trimester. It is unknown if this medication is excreted in breast milk. Cellcept Counseling:  I discussed with the patient the risks of mycophenolate mofetil including but not limited to infection/immunosuppression, GI upset, hypokalemia, hypercholesterolemia, bone marrow suppression, lymphoproliferative disorders, malignancy, GI ulceration/bleed/perforation, colitis, interstitial lung disease, kidney failure, progressive multifocal leukoencephalopathy, and birth defects. The patient understands that monitoring is required including a baseline creatinine and regular CBC testing. In addition, patient must alert us immediately if symptoms of infection or other concerning signs are noted. Isotretinoin Pregnancy And Lactation Text: This medication is Pregnancy Category X and is considered extremely dangerous during pregnancy. It is unknown if it is excreted in breast milk. Minocycline Counseling: Patient advised regarding possible photosensitivity and discoloration of the teeth, skin, lips, tongue and gums. Patient instructed to avoid sunlight, if possible. When exposed to sunlight, patients should wear protective clothing, sunglasses, and sunscreen. The patient was instructed to call the office immediately if the following severe adverse effects occur:  hearing changes, easy bruising/bleeding, severe headache, or vision changes. The patient verbalized understanding of the proper use and possible adverse effects of minocycline. All of the patient's questions and concerns were addressed. Arava Counseling:  Patient counseled regarding adverse effects of Arava including but not limited to nausea, vomiting, abnormalities in liver function tests. Patients may develop mouth sores, rash, diarrhea, and abnormalities in blood counts. The patient understands that monitoring is required including LFTs and blood counts. There is a rare possibility of scarring of the liver and lung problems that can occur when taking methotrexate. Persistent nausea, loss of appetite, pale stools, dark urine, cough, and shortness of breath should be reported immediately. Patient advised to discontinue Arava treatment and consult with a physician prior to attempting conception. The patient will have to undergo a treatment to eliminate Arava from the body prior to conception. Colchicine Counseling:  Patient counseled regarding adverse effects including but not limited to stomach upset (nausea, vomiting, stomach pain, or diarrhea). Patient instructed to limit alcohol consumption while taking this medication. Colchicine may reduce blood counts especially with prolonged use. The patient understands that monitoring of kidney function and blood counts may be required, especially at baseline. The patient verbalized understanding of the proper use and possible adverse effects of colchicine. All of the patient's questions and concerns were addressed. Cimzia Counseling:  I discussed with the patient the risks of Cimzia including but not limited to immunosuppression, allergic reactions and infections. The patient understands that monitoring is required including a PPD at baseline and must alert us or the primary physician if symptoms of infection or other concerning signs are noted. Nsaids Pregnancy And Lactation Text: These medications are considered safe up to 30 weeks gestation. It is excreted in breast milk. Tranexamic Acid Counseling:  Patient advised of the small risk of bleeding problems with tranexamic acid. They were also instructed to call if they developed any nausea, vomiting or diarrhea. All of the patient's questions and concerns were addressed. High Dose Vitamin A Counseling: Side effects reviewed, pt to contact office should one occur.

## 2023-10-10 NOTE — PROGRESS NOTE ADULT - SUBJECTIVE AND OBJECTIVE BOX
Patient is a 78M h/o ESRD, Afib on Eliquis, CAD s/p PCI, prosthetic s/p MVR, h/o proctitis, hemorrhoids, hypothyroidism was in ICU for metabolic encephalopathy, septic shock 2/2 MRSA pneumonia s/p tunnel cath removal 10/3, IJ Shiley placed 10/4, downgraded to 3E.      INTERVAL HPI/OVERNIGHT EVENTS: Patient seen and examined at bedside. No overnight events.    MEDICATIONS  (STANDING):  aspirin  chewable 81 milliGRAM(s) Oral daily  ceftaroline fosamil IVPB 200 milliGRAM(s) IV Intermittent every 8 hours  ceftaroline fosamil IVPB      chlorhexidine 2% Cloths 1 Application(s) Topical <User Schedule>  epoetin val (PROCRIT) Injectable 22609 Unit(s) IV Push <User Schedule>  folic acid 1 milliGRAM(s) Oral daily  hydrocortisone hemorrhoidal Suppository 1 Suppository(s) Rectal two times a day  influenza  Vaccine (HIGH DOSE) 0.7 milliLiter(s) IntraMuscular once  levothyroxine 150 MICROGram(s) Oral daily  mesalamine Suppository 1000 milliGRAM(s) Rectal at bedtime  midodrine 10 milliGRAM(s) Oral every 8 hours  pantoprazole   Suspension 40 milliGRAM(s) Enteral Tube daily  polyethylene glycol 3350 17 Gram(s) Oral daily  senna 2 Tablet(s) Oral at bedtime  vancomycin  IVPB 750 milliGRAM(s) IV Intermittent <User Schedule>    MEDICATIONS  (PRN):  acetaminophen     Tablet .. 650 milliGRAM(s) Oral every 6 hours PRN Mild Pain (1 - 3)  aluminum hydroxide/magnesium hydroxide/simethicone Suspension 10 milliLiter(s) Oral every 6 hours PRN dyspepsia  sodium chloride 0.9% lock flush 10 milliLiter(s) IV Push every 1 hour PRN Pre/post blood products, medications, blood draw, and to maintain line patency      Allergies    levofloxacin (Unknown)  alfuzosin (Unknown)  tamsulosin (Unknown)  Myrbetriq (Unknown)    Intolerances        Vital Signs Last 24 Hrs  T(C): 36.9 (10 Oct 2023 12:24), Max: 36.9 (10 Oct 2023 12:24)  T(F): 98.4 (10 Oct 2023 12:24), Max: 98.4 (10 Oct 2023 12:24)  HR: 99 (10 Oct 2023 12:24) (93 - 107)  BP: 103/68 (10 Oct 2023 12:24) (103/68 - 132/68)  BP(mean): --  RR: 18 (10 Oct 2023 12:24) (17 - 18)  SpO2: 94% (10 Oct 2023 12:24) (93% - 94%)    Parameters below as of 10 Oct 2023 12:24  Patient On (Oxygen Delivery Method): room air      I&O's Summary        PHYSICAL EXAM:  GENERAL: NAD  HEENT:  AT/NC, moist mucous membranes  CHEST/LUNG:  CTA b/l, no rales, wheezes, or rhonchi,  normal respiratory effort, no intercostal retractions  HEART:  irregular HR, S1, S2; no pitting edema  ABDOMEN:  BS+, soft, nontender, nondistended  MSK/EXTREMITIES: 2+ peripheral pulses, b/l upper ext edema, nontender, edema rt foot  NERVOUS SYSTEM: awake, responds to some questions, AOx0-1      LABS: Personally reviewed  CBC                        7.9    17.93 )-----------( 119      ( 10 Oct 2023 06:36 )             23.3     CMP  10-10    136  |  100  |  56  ----------------------------<  115  4.4   |  27  |  4.17    Ca    9.6      10 Oct 2023 06:36  Phos  3.2     10-09  Mg     1.9     10-09    TPro  6.2  /  Alb  2.1  /  TBili  0.8  /  DBili  x   /  AST  17  /  ALT  15  /  AlkPhos  225  10-10          PT/INR - ( 08 Oct 2023 06:30 )   PT: 12.1 sec;   INR: 1.06 ratio         PTT - ( 08 Oct 2023 06:30 )  PTT:30.8 sec            TSH 31.921   TSH with FT4 reflex --  Total T3 --                  Urinalysis Basic - ( 10 Oct 2023 06:36 )    Color: x / Appearance: x / SG: x / pH: x  Gluc: 115 mg/dL / Ketone: x  / Bili: x / Urobili: x   Blood: x / Protein: x / Nitrite: x   Leuk Esterase: x / RBC: x / WBC x   Sq Epi: x / Non Sq Epi: x / Bacteria: x        Culture - Blood (collected 09 Oct 2023 07:00)  Source: .Blood Blood  Gram Stain (10 Oct 2023 09:30):    Growth in aerobic bottle: Gram Positive Cocci in Clusters    Growth in anaerobic bottle: Gram Positive Cocci in Clusters  Preliminary Report (10 Oct 2023 09:31):    Growth in aerobic bottle: Gram Positive Cocci in Clusters    Growth in anaerobic bottle: Gram Positive Cocci in Clusters    Culture - Blood (collected 09 Oct 2023 07:00)  Source: .Blood Blood  Gram Stain (10 Oct 2023 06:14):    Growth in aerobic bottle: Gram Positive Cocci in Clusters    Growth in anaerobic bottle: Gram Positive Cocci in Clusters  Preliminary Report (10 Oct 2023 06:14):    Growth in aerobic bottle: Gram Positive Cocci in Clusters    Growth in anaerobic bottle: Gram Positive Cocci in Clusters    Culture - Blood (collected 06 Oct 2023 10:20)  Source: .Blood Blood-Peripheral  Gram Stain (07 Oct 2023 04:46):    Growth in aerobic bottle: Gram Positive Cocci in Clusters    Growth in anaerobic bottle: Gram Positive Cocci in Clusters  Final Report (08 Oct 2023 08:18):    Growth in aerobic and anaerobic bottles: Methicillin Resistant    Staphylococcus aureus    See previous culture [id number]    Culture - Blood (collected 04 Oct 2023 06:00)  Source: .Blood Blood  Gram Stain (05 Oct 2023 07:40):    Growth in aerobic bottle: Gram Positive Cocci in Clusters    Growth in anaerobic bottle: Gram Positive Cocci in Clusters  Final Report (06 Oct 2023 14:49):    Growth in aerobic and anaerobic bottles: Methicillin Resistant    Staphylococcus aureus    See previous culture [id number]    Culture - Blood (collected 04 Oct 2023 06:00)  Source: .Blood Blood  Gram Stain (05 Oct 2023 08:18):    Growth in aerobic bottle: Gram Positive Cocci in Clusters    Growth in anaerobic bottle: Gram Positive Cocci in Clusters  Final Report (06 Oct 2023 14:47):    Growth in aerobic and anaerobic bottles: Methicillin Resistant    Staphylococcus aureus  Organism: Methicillin resistant Staphylococcus aureus (06 Oct 2023 14:47)  Organism: Methicillin resistant Staphylococcus aureus (06 Oct 2023 14:47)      Method Type: LEWIS      -  Ampicillin/Sulbactam: R <=8/4      -  Cefazolin: R >16      -  Clindamycin: R >4      -  Daptomycin: S 1      -  Erythromycin: R >4      -  Gentamicin: S <=1 Should not be used as monotherapy      -  Linezolid: S 2      -  Oxacillin: R >2      -  Penicillin: R 8      -  Rifampin: S <=1 Should not be used as monotherapy      -  Tetracycline: S 2      -  Trimethoprim/Sulfamethoxazole: S <=0.5/9.5      -  Vancomycin: S 2            Culture - Blood (collected 10-09-23 @ 07:00)  Source: .Blood Blood  Gram Stain (10-10-23 @ 09:30):    Growth in aerobic bottle: Gram Positive Cocci in Clusters    Growth in anaerobic bottle: Gram Positive Cocci in Clusters  Preliminary Report (10-10-23 @ 09:31):    Growth in aerobic bottle: Gram Positive Cocci in Clusters    Growth in anaerobic bottle: Gram Positive Cocci in Clusters    Culture - Blood (collected 10-09-23 @ 07:00)  Source: .Blood Blood  Gram Stain (10-10-23 @ 06:14):    Growth in aerobic bottle: Gram Positive Cocci in Clusters    Growth in anaerobic bottle: Gram Positive Cocci in Clusters  Preliminary Report (10-10-23 @ 06:14):    Growth in aerobic bottle: Gram Positive Cocci in Clusters    Growth in anaerobic bottle: Gram Positive Cocci in Clusters    Culture - Blood (collected 10-06-23 @ 10:20)  Source: .Blood Blood-Peripheral  Gram Stain (10-07-23 @ 04:46):    Growth in aerobic bottle: Gram Positive Cocci in Clusters    Growth in anaerobic bottle: Gram Positive Cocci in Clusters  Final Report (10-08-23 @ 08:18):    Growth in aerobic and anaerobic bottles: Methicillin Resistant    Staphylococcus aureus    See previous culture [id number]    Culture - Blood (collected 10-04-23 @ 06:00)  Source: .Blood Blood  Gram Stain (10-05-23 @ 07:40):    Growth in aerobic bottle: Gram Positive Cocci in Clusters    Growth in anaerobic bottle: Gram Positive Cocci in Clusters  Final Report (10-06-23 @ 14:49):    Growth in aerobic and anaerobic bottles: Methicillin Resistant    Staphylococcus aureus    See previous culture [id number]    Culture - Blood (collected 10-04-23 @ 06:00)  Source: .Blood Blood  Gram Stain (10-05-23 @ 08:18):    Growth in aerobic bottle: Gram Positive Cocci in Clusters    Growth in anaerobic bottle: Gram Positive Cocci in Clusters  Final Report (10-06-23 @ 14:47):    Growth in aerobic and anaerobic bottles: Methicillin Resistant    Staphylococcus aureus  Organism: Methicillin resistant Staphylococcus aureus (10-06-23 @ 14:47)  Organism: Methicillin resistant Staphylococcus aureus (10-06-23 @ 14:47)      Method Type: LEWIS      -  Ampicillin/Sulbactam: R <=8/4      -  Cefazolin: R >16      -  Clindamycin: R >4      -  Daptomycin: S 1      -  Erythromycin: R >4      -  Gentamicin: S <=1 Should not be used as monotherapy      -  Linezolid: S 2      -  Oxacillin: R >2      -  Penicillin: R 8      -  Rifampin: S <=1 Should not be used as monotherapy      -  Tetracycline: S 2      -  Trimethoprim/Sulfamethoxazole: S <=0.5/9.5      -  Vancomycin: S 2        RADIOLOGY & ADDITIONAL TESTS: Personally reviewed.   < from: US Duplex Venous Upper Ext Complete, Bilateral (10.10.23 @ 10:09) >  No evidence of deep venous thrombosis in either upper extremity. Please   note:The right internal jugular and right subclavian vein are unable to   be assessed due to the presence of dressings/vascular access devices.    < end of copied text >      Consultant(s) Notes Reviewed:  [x] YES  [ ] NO   Discussed with TRACEY/PEPPER, RN     Patient is a 78M h/o ESRD, Afib on Eliquis, CAD s/p PCI, prosthetic s/p MVR, h/o proctitis, hemorrhoids, hypothyroidism was in ICU for metabolic encephalopathy, septic shock 2/2 MRSA pneumonia s/p tunnel cath removal 10/3, IJ Shiley placed 10/4, downgraded to 3E.      INTERVAL HPI/OVERNIGHT EVENTS: Patient seen and examined at bedside. No overnight events.    MEDICATIONS  (STANDING):  aspirin  chewable 81 milliGRAM(s) Oral daily  ceftaroline fosamil IVPB 200 milliGRAM(s) IV Intermittent every 8 hours  ceftaroline fosamil IVPB      chlorhexidine 2% Cloths 1 Application(s) Topical <User Schedule>  epoetin val (PROCRIT) Injectable 77868 Unit(s) IV Push <User Schedule>  folic acid 1 milliGRAM(s) Oral daily  hydrocortisone hemorrhoidal Suppository 1 Suppository(s) Rectal two times a day  influenza  Vaccine (HIGH DOSE) 0.7 milliLiter(s) IntraMuscular once  levothyroxine 150 MICROGram(s) Oral daily  mesalamine Suppository 1000 milliGRAM(s) Rectal at bedtime  midodrine 10 milliGRAM(s) Oral every 8 hours  pantoprazole   Suspension 40 milliGRAM(s) Enteral Tube daily  polyethylene glycol 3350 17 Gram(s) Oral daily  senna 2 Tablet(s) Oral at bedtime  vancomycin  IVPB 750 milliGRAM(s) IV Intermittent <User Schedule>    MEDICATIONS  (PRN):  acetaminophen     Tablet .. 650 milliGRAM(s) Oral every 6 hours PRN Mild Pain (1 - 3)  aluminum hydroxide/magnesium hydroxide/simethicone Suspension 10 milliLiter(s) Oral every 6 hours PRN dyspepsia  sodium chloride 0.9% lock flush 10 milliLiter(s) IV Push every 1 hour PRN Pre/post blood products, medications, blood draw, and to maintain line patency      Allergies    levofloxacin (Unknown)  alfuzosin (Unknown)  tamsulosin (Unknown)  Myrbetriq (Unknown)    Intolerances        Vital Signs Last 24 Hrs  T(C): 36.9 (10 Oct 2023 12:24), Max: 36.9 (10 Oct 2023 12:24)  T(F): 98.4 (10 Oct 2023 12:24), Max: 98.4 (10 Oct 2023 12:24)  HR: 99 (10 Oct 2023 12:24) (93 - 107)  BP: 103/68 (10 Oct 2023 12:24) (103/68 - 132/68)  BP(mean): --  RR: 18 (10 Oct 2023 12:24) (17 - 18)  SpO2: 94% (10 Oct 2023 12:24) (93% - 94%)    Parameters below as of 10 Oct 2023 12:24  Patient On (Oxygen Delivery Method): room air      I&O's Summary        PHYSICAL EXAM:  GENERAL: NAD  HEENT:  AT/NC, moist mucous membranes  CHEST/LUNG:  CTA b/l, no rales, wheezes, or rhonchi,  normal respiratory effort, no intercostal retractions  HEART:  irregular HR, S1, S2; no pitting edema  ABDOMEN:  BS+, soft, nontender, nondistended  MSK/EXTREMITIES: 2+ peripheral pulses, b/l upper ext edema, nontender, edema rt foot  NERVOUS SYSTEM: awake, responds to some questions, AOx0-1      LABS: Personally reviewed  CBC                        7.9    17.93 )-----------( 119      ( 10 Oct 2023 06:36 )             23.3     CMP  10-10    136  |  100  |  56  ----------------------------<  115  4.4   |  27  |  4.17    Ca    9.6      10 Oct 2023 06:36  Phos  3.2     10-09  Mg     1.9     10-09    TPro  6.2  /  Alb  2.1  /  TBili  0.8  /  DBili  x   /  AST  17  /  ALT  15  /  AlkPhos  225  10-10          PT/INR - ( 08 Oct 2023 06:30 )   PT: 12.1 sec;   INR: 1.06 ratio         PTT - ( 08 Oct 2023 06:30 )  PTT:30.8 sec            TSH 31.921   TSH with FT4 reflex --  Total T3 --                  Urinalysis Basic - ( 10 Oct 2023 06:36 )    Color: x / Appearance: x / SG: x / pH: x  Gluc: 115 mg/dL / Ketone: x  / Bili: x / Urobili: x   Blood: x / Protein: x / Nitrite: x   Leuk Esterase: x / RBC: x / WBC x   Sq Epi: x / Non Sq Epi: x / Bacteria: x        Culture - Blood (collected 09 Oct 2023 07:00)  Source: .Blood Blood  Gram Stain (10 Oct 2023 09:30):    Growth in aerobic bottle: Gram Positive Cocci in Clusters    Growth in anaerobic bottle: Gram Positive Cocci in Clusters  Preliminary Report (10 Oct 2023 09:31):    Growth in aerobic bottle: Gram Positive Cocci in Clusters    Growth in anaerobic bottle: Gram Positive Cocci in Clusters    Culture - Blood (collected 09 Oct 2023 07:00)  Source: .Blood Blood  Gram Stain (10 Oct 2023 06:14):    Growth in aerobic bottle: Gram Positive Cocci in Clusters    Growth in anaerobic bottle: Gram Positive Cocci in Clusters  Preliminary Report (10 Oct 2023 06:14):    Growth in aerobic bottle: Gram Positive Cocci in Clusters    Growth in anaerobic bottle: Gram Positive Cocci in Clusters    Culture - Blood (collected 06 Oct 2023 10:20)  Source: .Blood Blood-Peripheral  Gram Stain (07 Oct 2023 04:46):    Growth in aerobic bottle: Gram Positive Cocci in Clusters    Growth in anaerobic bottle: Gram Positive Cocci in Clusters  Final Report (08 Oct 2023 08:18):    Growth in aerobic and anaerobic bottles: Methicillin Resistant    Staphylococcus aureus    See previous culture [id number]    Culture - Blood (collected 04 Oct 2023 06:00)  Source: .Blood Blood  Gram Stain (05 Oct 2023 07:40):    Growth in aerobic bottle: Gram Positive Cocci in Clusters    Growth in anaerobic bottle: Gram Positive Cocci in Clusters  Final Report (06 Oct 2023 14:49):    Growth in aerobic and anaerobic bottles: Methicillin Resistant    Staphylococcus aureus    See previous culture [id number]    Culture - Blood (collected 04 Oct 2023 06:00)  Source: .Blood Blood  Gram Stain (05 Oct 2023 08:18):    Growth in aerobic bottle: Gram Positive Cocci in Clusters    Growth in anaerobic bottle: Gram Positive Cocci in Clusters  Final Report (06 Oct 2023 14:47):    Growth in aerobic and anaerobic bottles: Methicillin Resistant    Staphylococcus aureus  Organism: Methicillin resistant Staphylococcus aureus (06 Oct 2023 14:47)  Organism: Methicillin resistant Staphylococcus aureus (06 Oct 2023 14:47)      Method Type: LEWIS      -  Ampicillin/Sulbactam: R <=8/4      -  Cefazolin: R >16      -  Clindamycin: R >4      -  Daptomycin: S 1      -  Erythromycin: R >4      -  Gentamicin: S <=1 Should not be used as monotherapy      -  Linezolid: S 2      -  Oxacillin: R >2      -  Penicillin: R 8      -  Rifampin: S <=1 Should not be used as monotherapy      -  Tetracycline: S 2      -  Trimethoprim/Sulfamethoxazole: S <=0.5/9.5      -  Vancomycin: S 2            Culture - Blood (collected 10-09-23 @ 07:00)  Source: .Blood Blood  Gram Stain (10-10-23 @ 09:30):    Growth in aerobic bottle: Gram Positive Cocci in Clusters    Growth in anaerobic bottle: Gram Positive Cocci in Clusters  Preliminary Report (10-10-23 @ 09:31):    Growth in aerobic bottle: Gram Positive Cocci in Clusters    Growth in anaerobic bottle: Gram Positive Cocci in Clusters    Culture - Blood (collected 10-09-23 @ 07:00)  Source: .Blood Blood  Gram Stain (10-10-23 @ 06:14):    Growth in aerobic bottle: Gram Positive Cocci in Clusters    Growth in anaerobic bottle: Gram Positive Cocci in Clusters  Preliminary Report (10-10-23 @ 06:14):    Growth in aerobic bottle: Gram Positive Cocci in Clusters    Growth in anaerobic bottle: Gram Positive Cocci in Clusters    Culture - Blood (collected 10-06-23 @ 10:20)  Source: .Blood Blood-Peripheral  Gram Stain (10-07-23 @ 04:46):    Growth in aerobic bottle: Gram Positive Cocci in Clusters    Growth in anaerobic bottle: Gram Positive Cocci in Clusters  Final Report (10-08-23 @ 08:18):    Growth in aerobic and anaerobic bottles: Methicillin Resistant    Staphylococcus aureus    See previous culture [id number]    Culture - Blood (collected 10-04-23 @ 06:00)  Source: .Blood Blood  Gram Stain (10-05-23 @ 07:40):    Growth in aerobic bottle: Gram Positive Cocci in Clusters    Growth in anaerobic bottle: Gram Positive Cocci in Clusters  Final Report (10-06-23 @ 14:49):    Growth in aerobic and anaerobic bottles: Methicillin Resistant    Staphylococcus aureus    See previous culture [id number]    Culture - Blood (collected 10-04-23 @ 06:00)  Source: .Blood Blood  Gram Stain (10-05-23 @ 08:18):    Growth in aerobic bottle: Gram Positive Cocci in Clusters    Growth in anaerobic bottle: Gram Positive Cocci in Clusters  Final Report (10-06-23 @ 14:47):    Growth in aerobic and anaerobic bottles: Methicillin Resistant    Staphylococcus aureus  Organism: Methicillin resistant Staphylococcus aureus (10-06-23 @ 14:47)  Organism: Methicillin resistant Staphylococcus aureus (10-06-23 @ 14:47)      Method Type: LEWIS      -  Ampicillin/Sulbactam: R <=8/4      -  Cefazolin: R >16      -  Clindamycin: R >4      -  Daptomycin: S 1      -  Erythromycin: R >4      -  Gentamicin: S <=1 Should not be used as monotherapy      -  Linezolid: S 2      -  Oxacillin: R >2      -  Penicillin: R 8      -  Rifampin: S <=1 Should not be used as monotherapy      -  Tetracycline: S 2      -  Trimethoprim/Sulfamethoxazole: S <=0.5/9.5      -  Vancomycin: S 2        RADIOLOGY & ADDITIONAL TESTS: Personally reviewed.   < from: US Duplex Venous Upper Ext Complete, Bilateral (10.10.23 @ 10:09) >  No evidence of deep venous thrombosis in either upper extremity. Please   note:The right internal jugular and right subclavian vein are unable to   be assessed due to the presence of dressings/vascular access devices.    < end of copied text >      Consultant(s) Notes Reviewed:  [x] YES  [ ] NO   Discussed with TRACEY/PEPPER, RN     Patient is a 78M h/o ESRD, Afib on Eliquis, CAD s/p PCI, prosthetic s/p MVR, h/o proctitis, hemorrhoids, hypothyroidism was in ICU for metabolic encephalopathy, septic shock 2/2 MRSA pneumonia s/p tunnel cath removal 10/3, IJ Shiley placed 10/4, downgraded to 3E.      INTERVAL HPI/OVERNIGHT EVENTS: Patient seen and examined at bedside. No overnight events.    MEDICATIONS  (STANDING):  aspirin  chewable 81 milliGRAM(s) Oral daily  ceftaroline fosamil IVPB 200 milliGRAM(s) IV Intermittent every 8 hours  ceftaroline fosamil IVPB      chlorhexidine 2% Cloths 1 Application(s) Topical <User Schedule>  epoetin val (PROCRIT) Injectable 68965 Unit(s) IV Push <User Schedule>  folic acid 1 milliGRAM(s) Oral daily  hydrocortisone hemorrhoidal Suppository 1 Suppository(s) Rectal two times a day  influenza  Vaccine (HIGH DOSE) 0.7 milliLiter(s) IntraMuscular once  levothyroxine 150 MICROGram(s) Oral daily  mesalamine Suppository 1000 milliGRAM(s) Rectal at bedtime  midodrine 10 milliGRAM(s) Oral every 8 hours  pantoprazole   Suspension 40 milliGRAM(s) Enteral Tube daily  polyethylene glycol 3350 17 Gram(s) Oral daily  senna 2 Tablet(s) Oral at bedtime  vancomycin  IVPB 750 milliGRAM(s) IV Intermittent <User Schedule>    MEDICATIONS  (PRN):  acetaminophen     Tablet .. 650 milliGRAM(s) Oral every 6 hours PRN Mild Pain (1 - 3)  aluminum hydroxide/magnesium hydroxide/simethicone Suspension 10 milliLiter(s) Oral every 6 hours PRN dyspepsia  sodium chloride 0.9% lock flush 10 milliLiter(s) IV Push every 1 hour PRN Pre/post blood products, medications, blood draw, and to maintain line patency      Allergies    levofloxacin (Unknown)  alfuzosin (Unknown)  tamsulosin (Unknown)  Myrbetriq (Unknown)    Intolerances        Vital Signs Last 24 Hrs  T(C): 36.9 (10 Oct 2023 12:24), Max: 36.9 (10 Oct 2023 12:24)  T(F): 98.4 (10 Oct 2023 12:24), Max: 98.4 (10 Oct 2023 12:24)  HR: 99 (10 Oct 2023 12:24) (93 - 107)  BP: 103/68 (10 Oct 2023 12:24) (103/68 - 132/68)  BP(mean): --  RR: 18 (10 Oct 2023 12:24) (17 - 18)  SpO2: 94% (10 Oct 2023 12:24) (93% - 94%)    Parameters below as of 10 Oct 2023 12:24  Patient On (Oxygen Delivery Method): room air      I&O's Summary        PHYSICAL EXAM:  GENERAL: NAD  HEENT:  AT/NC, moist mucous membranes  CHEST/LUNG:  CTA b/l, no rales, wheezes, or rhonchi,  normal respiratory effort, no intercostal retractions  HEART:  irregular HR, S1, S2; no pitting edema  ABDOMEN:  BS+, soft, nontender, nondistended  MSK/EXTREMITIES: 2+ peripheral pulses, b/l upper ext edema, nontender, edema rt foot  NERVOUS SYSTEM: awake, responds to some questions, AOx0-1      LABS: Personally reviewed  CBC                        7.9    17.93 )-----------( 119      ( 10 Oct 2023 06:36 )             23.3     CMP  10-10    136  |  100  |  56  ----------------------------<  115  4.4   |  27  |  4.17    Ca    9.6      10 Oct 2023 06:36  Phos  3.2     10-09  Mg     1.9     10-09    TPro  6.2  /  Alb  2.1  /  TBili  0.8  /  DBili  x   /  AST  17  /  ALT  15  /  AlkPhos  225  10-10          PT/INR - ( 08 Oct 2023 06:30 )   PT: 12.1 sec;   INR: 1.06 ratio         PTT - ( 08 Oct 2023 06:30 )  PTT:30.8 sec            TSH 31.921   TSH with FT4 reflex --  Total T3 --                  Urinalysis Basic - ( 10 Oct 2023 06:36 )    Color: x / Appearance: x / SG: x / pH: x  Gluc: 115 mg/dL / Ketone: x  / Bili: x / Urobili: x   Blood: x / Protein: x / Nitrite: x   Leuk Esterase: x / RBC: x / WBC x   Sq Epi: x / Non Sq Epi: x / Bacteria: x        Culture - Blood (collected 09 Oct 2023 07:00)  Source: .Blood Blood  Gram Stain (10 Oct 2023 09:30):    Growth in aerobic bottle: Gram Positive Cocci in Clusters    Growth in anaerobic bottle: Gram Positive Cocci in Clusters  Preliminary Report (10 Oct 2023 09:31):    Growth in aerobic bottle: Gram Positive Cocci in Clusters    Growth in anaerobic bottle: Gram Positive Cocci in Clusters    Culture - Blood (collected 09 Oct 2023 07:00)  Source: .Blood Blood  Gram Stain (10 Oct 2023 06:14):    Growth in aerobic bottle: Gram Positive Cocci in Clusters    Growth in anaerobic bottle: Gram Positive Cocci in Clusters  Preliminary Report (10 Oct 2023 06:14):    Growth in aerobic bottle: Gram Positive Cocci in Clusters    Growth in anaerobic bottle: Gram Positive Cocci in Clusters    Culture - Blood (collected 06 Oct 2023 10:20)  Source: .Blood Blood-Peripheral  Gram Stain (07 Oct 2023 04:46):    Growth in aerobic bottle: Gram Positive Cocci in Clusters    Growth in anaerobic bottle: Gram Positive Cocci in Clusters  Final Report (08 Oct 2023 08:18):    Growth in aerobic and anaerobic bottles: Methicillin Resistant    Staphylococcus aureus    See previous culture [id number]    Culture - Blood (collected 04 Oct 2023 06:00)  Source: .Blood Blood  Gram Stain (05 Oct 2023 07:40):    Growth in aerobic bottle: Gram Positive Cocci in Clusters    Growth in anaerobic bottle: Gram Positive Cocci in Clusters  Final Report (06 Oct 2023 14:49):    Growth in aerobic and anaerobic bottles: Methicillin Resistant    Staphylococcus aureus    See previous culture [id number]    Culture - Blood (collected 04 Oct 2023 06:00)  Source: .Blood Blood  Gram Stain (05 Oct 2023 08:18):    Growth in aerobic bottle: Gram Positive Cocci in Clusters    Growth in anaerobic bottle: Gram Positive Cocci in Clusters  Final Report (06 Oct 2023 14:47):    Growth in aerobic and anaerobic bottles: Methicillin Resistant    Staphylococcus aureus  Organism: Methicillin resistant Staphylococcus aureus (06 Oct 2023 14:47)  Organism: Methicillin resistant Staphylococcus aureus (06 Oct 2023 14:47)      Method Type: LEWIS      -  Ampicillin/Sulbactam: R <=8/4      -  Cefazolin: R >16      -  Clindamycin: R >4      -  Daptomycin: S 1      -  Erythromycin: R >4      -  Gentamicin: S <=1 Should not be used as monotherapy      -  Linezolid: S 2      -  Oxacillin: R >2      -  Penicillin: R 8      -  Rifampin: S <=1 Should not be used as monotherapy      -  Tetracycline: S 2      -  Trimethoprim/Sulfamethoxazole: S <=0.5/9.5      -  Vancomycin: S 2            Culture - Blood (collected 10-09-23 @ 07:00)  Source: .Blood Blood  Gram Stain (10-10-23 @ 09:30):    Growth in aerobic bottle: Gram Positive Cocci in Clusters    Growth in anaerobic bottle: Gram Positive Cocci in Clusters  Preliminary Report (10-10-23 @ 09:31):    Growth in aerobic bottle: Gram Positive Cocci in Clusters    Growth in anaerobic bottle: Gram Positive Cocci in Clusters    Culture - Blood (collected 10-09-23 @ 07:00)  Source: .Blood Blood  Gram Stain (10-10-23 @ 06:14):    Growth in aerobic bottle: Gram Positive Cocci in Clusters    Growth in anaerobic bottle: Gram Positive Cocci in Clusters  Preliminary Report (10-10-23 @ 06:14):    Growth in aerobic bottle: Gram Positive Cocci in Clusters    Growth in anaerobic bottle: Gram Positive Cocci in Clusters    Culture - Blood (collected 10-06-23 @ 10:20)  Source: .Blood Blood-Peripheral  Gram Stain (10-07-23 @ 04:46):    Growth in aerobic bottle: Gram Positive Cocci in Clusters    Growth in anaerobic bottle: Gram Positive Cocci in Clusters  Final Report (10-08-23 @ 08:18):    Growth in aerobic and anaerobic bottles: Methicillin Resistant    Staphylococcus aureus    See previous culture [id number]    Culture - Blood (collected 10-04-23 @ 06:00)  Source: .Blood Blood  Gram Stain (10-05-23 @ 07:40):    Growth in aerobic bottle: Gram Positive Cocci in Clusters    Growth in anaerobic bottle: Gram Positive Cocci in Clusters  Final Report (10-06-23 @ 14:49):    Growth in aerobic and anaerobic bottles: Methicillin Resistant    Staphylococcus aureus    See previous culture [id number]    Culture - Blood (collected 10-04-23 @ 06:00)  Source: .Blood Blood  Gram Stain (10-05-23 @ 08:18):    Growth in aerobic bottle: Gram Positive Cocci in Clusters    Growth in anaerobic bottle: Gram Positive Cocci in Clusters  Final Report (10-06-23 @ 14:47):    Growth in aerobic and anaerobic bottles: Methicillin Resistant    Staphylococcus aureus  Organism: Methicillin resistant Staphylococcus aureus (10-06-23 @ 14:47)  Organism: Methicillin resistant Staphylococcus aureus (10-06-23 @ 14:47)      Method Type: LEWIS      -  Ampicillin/Sulbactam: R <=8/4      -  Cefazolin: R >16      -  Clindamycin: R >4      -  Daptomycin: S 1      -  Erythromycin: R >4      -  Gentamicin: S <=1 Should not be used as monotherapy      -  Linezolid: S 2      -  Oxacillin: R >2      -  Penicillin: R 8      -  Rifampin: S <=1 Should not be used as monotherapy      -  Tetracycline: S 2      -  Trimethoprim/Sulfamethoxazole: S <=0.5/9.5      -  Vancomycin: S 2        RADIOLOGY & ADDITIONAL TESTS: Personally reviewed.   < from: US Duplex Venous Upper Ext Complete, Bilateral (10.10.23 @ 10:09) >  No evidence of deep venous thrombosis in either upper extremity. Please   note:The right internal jugular and right subclavian vein are unable to   be assessed due to the presence of dressings/vascular access devices.    < end of copied text >      Consultant(s) Notes Reviewed:  [x] YES  [ ] NO   Discussed with TRACEY/PEPPER, RN     Patient is a 78M h/o ESRD, Afib on Eliquis, CAD s/p PCI, prosthetic s/p MVR, h/o proctitis, hemorrhoids, hypothyroidism was in ICU for metabolic encephalopathy, septic shock 2/2 MRSA Bacteremia s/p tunnel cath removal 10/3, IJ Shiley placed 10/4, downgraded to 3E. Persistent MRSA bacteremia despite antibiotics     INTERVAL HPI/OVERNIGHT EVENTS: Patient seen and examined at bedside. No overnight events.    MEDICATIONS  (STANDING):  aspirin  chewable 81 milliGRAM(s) Oral daily  ceftaroline fosamil IVPB 200 milliGRAM(s) IV Intermittent every 8 hours  ceftaroline fosamil IVPB      chlorhexidine 2% Cloths 1 Application(s) Topical <User Schedule>  epoetin val (PROCRIT) Injectable 42999 Unit(s) IV Push <User Schedule>  folic acid 1 milliGRAM(s) Oral daily  hydrocortisone hemorrhoidal Suppository 1 Suppository(s) Rectal two times a day  influenza  Vaccine (HIGH DOSE) 0.7 milliLiter(s) IntraMuscular once  levothyroxine 150 MICROGram(s) Oral daily  mesalamine Suppository 1000 milliGRAM(s) Rectal at bedtime  midodrine 10 milliGRAM(s) Oral every 8 hours  pantoprazole   Suspension 40 milliGRAM(s) Enteral Tube daily  polyethylene glycol 3350 17 Gram(s) Oral daily  senna 2 Tablet(s) Oral at bedtime  vancomycin  IVPB 750 milliGRAM(s) IV Intermittent <User Schedule>    MEDICATIONS  (PRN):  acetaminophen     Tablet .. 650 milliGRAM(s) Oral every 6 hours PRN Mild Pain (1 - 3)  aluminum hydroxide/magnesium hydroxide/simethicone Suspension 10 milliLiter(s) Oral every 6 hours PRN dyspepsia  sodium chloride 0.9% lock flush 10 milliLiter(s) IV Push every 1 hour PRN Pre/post blood products, medications, blood draw, and to maintain line patency      Allergies    levofloxacin (Unknown)  alfuzosin (Unknown)  tamsulosin (Unknown)  Myrbetriq (Unknown)    Intolerances        Vital Signs Last 24 Hrs  T(C): 36.9 (10 Oct 2023 12:24), Max: 36.9 (10 Oct 2023 12:24)  T(F): 98.4 (10 Oct 2023 12:24), Max: 98.4 (10 Oct 2023 12:24)  HR: 99 (10 Oct 2023 12:24) (93 - 107)  BP: 103/68 (10 Oct 2023 12:24) (103/68 - 132/68)  BP(mean): --  RR: 18 (10 Oct 2023 12:24) (17 - 18)  SpO2: 94% (10 Oct 2023 12:24) (93% - 94%)    Parameters below as of 10 Oct 2023 12:24  Patient On (Oxygen Delivery Method): room air      I&O's Summary        PHYSICAL EXAM:  GENERAL: NAD  HEENT:  AT/NC, moist mucous membranes  CHEST/LUNG:  CTA b/l, no rales, wheezes, or rhonchi,  normal respiratory effort, no intercostal retractions  HEART:  irregular HR, S1, S2; no pitting edema  ABDOMEN:  BS+, soft, nontender, nondistended  MSK/EXTREMITIES: 2+ peripheral pulses, b/l upper ext edema, nontender, edema rt foot  NERVOUS SYSTEM: awake, responds to some questions, AOx0-1      LABS: Personally reviewed  CBC                        7.9    17.93 )-----------( 119      ( 10 Oct 2023 06:36 )             23.3     CMP  10-10    136  |  100  |  56  ----------------------------<  115  4.4   |  27  |  4.17    Ca    9.6      10 Oct 2023 06:36  Phos  3.2     10-09  Mg     1.9     10-09    TPro  6.2  /  Alb  2.1  /  TBili  0.8  /  DBili  x   /  AST  17  /  ALT  15  /  AlkPhos  225  10-10          PT/INR - ( 08 Oct 2023 06:30 )   PT: 12.1 sec;   INR: 1.06 ratio         PTT - ( 08 Oct 2023 06:30 )  PTT:30.8 sec            TSH 31.921   TSH with FT4 reflex --  Total T3 --                  Urinalysis Basic - ( 10 Oct 2023 06:36 )    Color: x / Appearance: x / SG: x / pH: x  Gluc: 115 mg/dL / Ketone: x  / Bili: x / Urobili: x   Blood: x / Protein: x / Nitrite: x   Leuk Esterase: x / RBC: x / WBC x   Sq Epi: x / Non Sq Epi: x / Bacteria: x        Culture - Blood (collected 09 Oct 2023 07:00)  Source: .Blood Blood  Gram Stain (10 Oct 2023 09:30):    Growth in aerobic bottle: Gram Positive Cocci in Clusters    Growth in anaerobic bottle: Gram Positive Cocci in Clusters  Preliminary Report (10 Oct 2023 09:31):    Growth in aerobic bottle: Gram Positive Cocci in Clusters    Growth in anaerobic bottle: Gram Positive Cocci in Clusters    Culture - Blood (collected 09 Oct 2023 07:00)  Source: .Blood Blood  Gram Stain (10 Oct 2023 06:14):    Growth in aerobic bottle: Gram Positive Cocci in Clusters    Growth in anaerobic bottle: Gram Positive Cocci in Clusters  Preliminary Report (10 Oct 2023 06:14):    Growth in aerobic bottle: Gram Positive Cocci in Clusters    Growth in anaerobic bottle: Gram Positive Cocci in Clusters    Culture - Blood (collected 06 Oct 2023 10:20)  Source: .Blood Blood-Peripheral  Gram Stain (07 Oct 2023 04:46):    Growth in aerobic bottle: Gram Positive Cocci in Clusters    Growth in anaerobic bottle: Gram Positive Cocci in Clusters  Final Report (08 Oct 2023 08:18):    Growth in aerobic and anaerobic bottles: Methicillin Resistant    Staphylococcus aureus    See previous culture [id number]    Culture - Blood (collected 04 Oct 2023 06:00)  Source: .Blood Blood  Gram Stain (05 Oct 2023 07:40):    Growth in aerobic bottle: Gram Positive Cocci in Clusters    Growth in anaerobic bottle: Gram Positive Cocci in Clusters  Final Report (06 Oct 2023 14:49):    Growth in aerobic and anaerobic bottles: Methicillin Resistant    Staphylococcus aureus    See previous culture [id number]    Culture - Blood (collected 04 Oct 2023 06:00)  Source: .Blood Blood  Gram Stain (05 Oct 2023 08:18):    Growth in aerobic bottle: Gram Positive Cocci in Clusters    Growth in anaerobic bottle: Gram Positive Cocci in Clusters  Final Report (06 Oct 2023 14:47):    Growth in aerobic and anaerobic bottles: Methicillin Resistant    Staphylococcus aureus  Organism: Methicillin resistant Staphylococcus aureus (06 Oct 2023 14:47)  Organism: Methicillin resistant Staphylococcus aureus (06 Oct 2023 14:47)      Method Type: LEWIS      -  Ampicillin/Sulbactam: R <=8/4      -  Cefazolin: R >16      -  Clindamycin: R >4      -  Daptomycin: S 1      -  Erythromycin: R >4      -  Gentamicin: S <=1 Should not be used as monotherapy      -  Linezolid: S 2      -  Oxacillin: R >2      -  Penicillin: R 8      -  Rifampin: S <=1 Should not be used as monotherapy      -  Tetracycline: S 2      -  Trimethoprim/Sulfamethoxazole: S <=0.5/9.5      -  Vancomycin: S 2            Culture - Blood (collected 10-09-23 @ 07:00)  Source: .Blood Blood  Gram Stain (10-10-23 @ 09:30):    Growth in aerobic bottle: Gram Positive Cocci in Clusters    Growth in anaerobic bottle: Gram Positive Cocci in Clusters  Preliminary Report (10-10-23 @ 09:31):    Growth in aerobic bottle: Gram Positive Cocci in Clusters    Growth in anaerobic bottle: Gram Positive Cocci in Clusters    Culture - Blood (collected 10-09-23 @ 07:00)  Source: .Blood Blood  Gram Stain (10-10-23 @ 06:14):    Growth in aerobic bottle: Gram Positive Cocci in Clusters    Growth in anaerobic bottle: Gram Positive Cocci in Clusters  Preliminary Report (10-10-23 @ 06:14):    Growth in aerobic bottle: Gram Positive Cocci in Clusters    Growth in anaerobic bottle: Gram Positive Cocci in Clusters    Culture - Blood (collected 10-06-23 @ 10:20)  Source: .Blood Blood-Peripheral  Gram Stain (10-07-23 @ 04:46):    Growth in aerobic bottle: Gram Positive Cocci in Clusters    Growth in anaerobic bottle: Gram Positive Cocci in Clusters  Final Report (10-08-23 @ 08:18):    Growth in aerobic and anaerobic bottles: Methicillin Resistant    Staphylococcus aureus    See previous culture [id number]    Culture - Blood (collected 10-04-23 @ 06:00)  Source: .Blood Blood  Gram Stain (10-05-23 @ 07:40):    Growth in aerobic bottle: Gram Positive Cocci in Clusters    Growth in anaerobic bottle: Gram Positive Cocci in Clusters  Final Report (10-06-23 @ 14:49):    Growth in aerobic and anaerobic bottles: Methicillin Resistant    Staphylococcus aureus    See previous culture [id number]    Culture - Blood (collected 10-04-23 @ 06:00)  Source: .Blood Blood  Gram Stain (10-05-23 @ 08:18):    Growth in aerobic bottle: Gram Positive Cocci in Clusters    Growth in anaerobic bottle: Gram Positive Cocci in Clusters  Final Report (10-06-23 @ 14:47):    Growth in aerobic and anaerobic bottles: Methicillin Resistant    Staphylococcus aureus  Organism: Methicillin resistant Staphylococcus aureus (10-06-23 @ 14:47)  Organism: Methicillin resistant Staphylococcus aureus (10-06-23 @ 14:47)      Method Type: LEWIS      -  Ampicillin/Sulbactam: R <=8/4      -  Cefazolin: R >16      -  Clindamycin: R >4      -  Daptomycin: S 1      -  Erythromycin: R >4      -  Gentamicin: S <=1 Should not be used as monotherapy      -  Linezolid: S 2      -  Oxacillin: R >2      -  Penicillin: R 8      -  Rifampin: S <=1 Should not be used as monotherapy      -  Tetracycline: S 2      -  Trimethoprim/Sulfamethoxazole: S <=0.5/9.5      -  Vancomycin: S 2        RADIOLOGY & ADDITIONAL TESTS: Personally reviewed.   < from: US Duplex Venous Upper Ext Complete, Bilateral (10.10.23 @ 10:09) >  No evidence of deep venous thrombosis in either upper extremity. Please   note:The right internal jugular and right subclavian vein are unable to   be assessed due to the presence of dressings/vascular access devices.    < end of copied text >      Consultant(s) Notes Reviewed:  [x] YES  [ ] NO   Discussed with TRACEY/PEPPER, RN     Patient is a 78M h/o ESRD, Afib on Eliquis, CAD s/p PCI, prosthetic s/p MVR, h/o proctitis, hemorrhoids, hypothyroidism was in ICU for metabolic encephalopathy, septic shock 2/2 MRSA Bacteremia s/p tunnel cath removal 10/3, IJ Shiley placed 10/4, downgraded to 3E. Persistent MRSA bacteremia despite antibiotics     INTERVAL HPI/OVERNIGHT EVENTS: Patient seen and examined at bedside. No overnight events.    MEDICATIONS  (STANDING):  aspirin  chewable 81 milliGRAM(s) Oral daily  ceftaroline fosamil IVPB 200 milliGRAM(s) IV Intermittent every 8 hours  ceftaroline fosamil IVPB      chlorhexidine 2% Cloths 1 Application(s) Topical <User Schedule>  epoetin val (PROCRIT) Injectable 42876 Unit(s) IV Push <User Schedule>  folic acid 1 milliGRAM(s) Oral daily  hydrocortisone hemorrhoidal Suppository 1 Suppository(s) Rectal two times a day  influenza  Vaccine (HIGH DOSE) 0.7 milliLiter(s) IntraMuscular once  levothyroxine 150 MICROGram(s) Oral daily  mesalamine Suppository 1000 milliGRAM(s) Rectal at bedtime  midodrine 10 milliGRAM(s) Oral every 8 hours  pantoprazole   Suspension 40 milliGRAM(s) Enteral Tube daily  polyethylene glycol 3350 17 Gram(s) Oral daily  senna 2 Tablet(s) Oral at bedtime  vancomycin  IVPB 750 milliGRAM(s) IV Intermittent <User Schedule>    MEDICATIONS  (PRN):  acetaminophen     Tablet .. 650 milliGRAM(s) Oral every 6 hours PRN Mild Pain (1 - 3)  aluminum hydroxide/magnesium hydroxide/simethicone Suspension 10 milliLiter(s) Oral every 6 hours PRN dyspepsia  sodium chloride 0.9% lock flush 10 milliLiter(s) IV Push every 1 hour PRN Pre/post blood products, medications, blood draw, and to maintain line patency      Allergies    levofloxacin (Unknown)  alfuzosin (Unknown)  tamsulosin (Unknown)  Myrbetriq (Unknown)    Intolerances        Vital Signs Last 24 Hrs  T(C): 36.9 (10 Oct 2023 12:24), Max: 36.9 (10 Oct 2023 12:24)  T(F): 98.4 (10 Oct 2023 12:24), Max: 98.4 (10 Oct 2023 12:24)  HR: 99 (10 Oct 2023 12:24) (93 - 107)  BP: 103/68 (10 Oct 2023 12:24) (103/68 - 132/68)  BP(mean): --  RR: 18 (10 Oct 2023 12:24) (17 - 18)  SpO2: 94% (10 Oct 2023 12:24) (93% - 94%)    Parameters below as of 10 Oct 2023 12:24  Patient On (Oxygen Delivery Method): room air      I&O's Summary        PHYSICAL EXAM:  GENERAL: NAD  HEENT:  AT/NC, moist mucous membranes  CHEST/LUNG:  CTA b/l, no rales, wheezes, or rhonchi,  normal respiratory effort, no intercostal retractions  HEART:  irregular HR, S1, S2; no pitting edema  ABDOMEN:  BS+, soft, nontender, nondistended  MSK/EXTREMITIES: 2+ peripheral pulses, b/l upper ext edema, nontender, edema rt foot  NERVOUS SYSTEM: awake, responds to some questions, AOx0-1      LABS: Personally reviewed  CBC                        7.9    17.93 )-----------( 119      ( 10 Oct 2023 06:36 )             23.3     CMP  10-10    136  |  100  |  56  ----------------------------<  115  4.4   |  27  |  4.17    Ca    9.6      10 Oct 2023 06:36  Phos  3.2     10-09  Mg     1.9     10-09    TPro  6.2  /  Alb  2.1  /  TBili  0.8  /  DBili  x   /  AST  17  /  ALT  15  /  AlkPhos  225  10-10          PT/INR - ( 08 Oct 2023 06:30 )   PT: 12.1 sec;   INR: 1.06 ratio         PTT - ( 08 Oct 2023 06:30 )  PTT:30.8 sec            TSH 31.921   TSH with FT4 reflex --  Total T3 --                  Urinalysis Basic - ( 10 Oct 2023 06:36 )    Color: x / Appearance: x / SG: x / pH: x  Gluc: 115 mg/dL / Ketone: x  / Bili: x / Urobili: x   Blood: x / Protein: x / Nitrite: x   Leuk Esterase: x / RBC: x / WBC x   Sq Epi: x / Non Sq Epi: x / Bacteria: x        Culture - Blood (collected 09 Oct 2023 07:00)  Source: .Blood Blood  Gram Stain (10 Oct 2023 09:30):    Growth in aerobic bottle: Gram Positive Cocci in Clusters    Growth in anaerobic bottle: Gram Positive Cocci in Clusters  Preliminary Report (10 Oct 2023 09:31):    Growth in aerobic bottle: Gram Positive Cocci in Clusters    Growth in anaerobic bottle: Gram Positive Cocci in Clusters    Culture - Blood (collected 09 Oct 2023 07:00)  Source: .Blood Blood  Gram Stain (10 Oct 2023 06:14):    Growth in aerobic bottle: Gram Positive Cocci in Clusters    Growth in anaerobic bottle: Gram Positive Cocci in Clusters  Preliminary Report (10 Oct 2023 06:14):    Growth in aerobic bottle: Gram Positive Cocci in Clusters    Growth in anaerobic bottle: Gram Positive Cocci in Clusters    Culture - Blood (collected 06 Oct 2023 10:20)  Source: .Blood Blood-Peripheral  Gram Stain (07 Oct 2023 04:46):    Growth in aerobic bottle: Gram Positive Cocci in Clusters    Growth in anaerobic bottle: Gram Positive Cocci in Clusters  Final Report (08 Oct 2023 08:18):    Growth in aerobic and anaerobic bottles: Methicillin Resistant    Staphylococcus aureus    See previous culture [id number]    Culture - Blood (collected 04 Oct 2023 06:00)  Source: .Blood Blood  Gram Stain (05 Oct 2023 07:40):    Growth in aerobic bottle: Gram Positive Cocci in Clusters    Growth in anaerobic bottle: Gram Positive Cocci in Clusters  Final Report (06 Oct 2023 14:49):    Growth in aerobic and anaerobic bottles: Methicillin Resistant    Staphylococcus aureus    See previous culture [id number]    Culture - Blood (collected 04 Oct 2023 06:00)  Source: .Blood Blood  Gram Stain (05 Oct 2023 08:18):    Growth in aerobic bottle: Gram Positive Cocci in Clusters    Growth in anaerobic bottle: Gram Positive Cocci in Clusters  Final Report (06 Oct 2023 14:47):    Growth in aerobic and anaerobic bottles: Methicillin Resistant    Staphylococcus aureus  Organism: Methicillin resistant Staphylococcus aureus (06 Oct 2023 14:47)  Organism: Methicillin resistant Staphylococcus aureus (06 Oct 2023 14:47)      Method Type: LEWIS      -  Ampicillin/Sulbactam: R <=8/4      -  Cefazolin: R >16      -  Clindamycin: R >4      -  Daptomycin: S 1      -  Erythromycin: R >4      -  Gentamicin: S <=1 Should not be used as monotherapy      -  Linezolid: S 2      -  Oxacillin: R >2      -  Penicillin: R 8      -  Rifampin: S <=1 Should not be used as monotherapy      -  Tetracycline: S 2      -  Trimethoprim/Sulfamethoxazole: S <=0.5/9.5      -  Vancomycin: S 2            Culture - Blood (collected 10-09-23 @ 07:00)  Source: .Blood Blood  Gram Stain (10-10-23 @ 09:30):    Growth in aerobic bottle: Gram Positive Cocci in Clusters    Growth in anaerobic bottle: Gram Positive Cocci in Clusters  Preliminary Report (10-10-23 @ 09:31):    Growth in aerobic bottle: Gram Positive Cocci in Clusters    Growth in anaerobic bottle: Gram Positive Cocci in Clusters    Culture - Blood (collected 10-09-23 @ 07:00)  Source: .Blood Blood  Gram Stain (10-10-23 @ 06:14):    Growth in aerobic bottle: Gram Positive Cocci in Clusters    Growth in anaerobic bottle: Gram Positive Cocci in Clusters  Preliminary Report (10-10-23 @ 06:14):    Growth in aerobic bottle: Gram Positive Cocci in Clusters    Growth in anaerobic bottle: Gram Positive Cocci in Clusters    Culture - Blood (collected 10-06-23 @ 10:20)  Source: .Blood Blood-Peripheral  Gram Stain (10-07-23 @ 04:46):    Growth in aerobic bottle: Gram Positive Cocci in Clusters    Growth in anaerobic bottle: Gram Positive Cocci in Clusters  Final Report (10-08-23 @ 08:18):    Growth in aerobic and anaerobic bottles: Methicillin Resistant    Staphylococcus aureus    See previous culture [id number]    Culture - Blood (collected 10-04-23 @ 06:00)  Source: .Blood Blood  Gram Stain (10-05-23 @ 07:40):    Growth in aerobic bottle: Gram Positive Cocci in Clusters    Growth in anaerobic bottle: Gram Positive Cocci in Clusters  Final Report (10-06-23 @ 14:49):    Growth in aerobic and anaerobic bottles: Methicillin Resistant    Staphylococcus aureus    See previous culture [id number]    Culture - Blood (collected 10-04-23 @ 06:00)  Source: .Blood Blood  Gram Stain (10-05-23 @ 08:18):    Growth in aerobic bottle: Gram Positive Cocci in Clusters    Growth in anaerobic bottle: Gram Positive Cocci in Clusters  Final Report (10-06-23 @ 14:47):    Growth in aerobic and anaerobic bottles: Methicillin Resistant    Staphylococcus aureus  Organism: Methicillin resistant Staphylococcus aureus (10-06-23 @ 14:47)  Organism: Methicillin resistant Staphylococcus aureus (10-06-23 @ 14:47)      Method Type: LEWIS      -  Ampicillin/Sulbactam: R <=8/4      -  Cefazolin: R >16      -  Clindamycin: R >4      -  Daptomycin: S 1      -  Erythromycin: R >4      -  Gentamicin: S <=1 Should not be used as monotherapy      -  Linezolid: S 2      -  Oxacillin: R >2      -  Penicillin: R 8      -  Rifampin: S <=1 Should not be used as monotherapy      -  Tetracycline: S 2      -  Trimethoprim/Sulfamethoxazole: S <=0.5/9.5      -  Vancomycin: S 2        RADIOLOGY & ADDITIONAL TESTS: Personally reviewed.   < from: US Duplex Venous Upper Ext Complete, Bilateral (10.10.23 @ 10:09) >  No evidence of deep venous thrombosis in either upper extremity. Please   note:The right internal jugular and right subclavian vein are unable to   be assessed due to the presence of dressings/vascular access devices.    < end of copied text >      Consultant(s) Notes Reviewed:  [x] YES  [ ] NO   Discussed with TRACEY/PEPPER, RN     Patient is a 78M h/o ESRD, Afib on Eliquis, CAD s/p PCI, prosthetic s/p MVR, h/o proctitis, hemorrhoids, hypothyroidism was in ICU for metabolic encephalopathy, septic shock 2/2 MRSA Bacteremia s/p tunnel cath removal 10/3, IJ Shiley placed 10/4, downgraded to 3E. Persistent MRSA bacteremia despite antibiotics     INTERVAL HPI/OVERNIGHT EVENTS: Patient seen and examined at bedside. No overnight events.    MEDICATIONS  (STANDING):  aspirin  chewable 81 milliGRAM(s) Oral daily  ceftaroline fosamil IVPB 200 milliGRAM(s) IV Intermittent every 8 hours  ceftaroline fosamil IVPB      chlorhexidine 2% Cloths 1 Application(s) Topical <User Schedule>  epoetin val (PROCRIT) Injectable 85980 Unit(s) IV Push <User Schedule>  folic acid 1 milliGRAM(s) Oral daily  hydrocortisone hemorrhoidal Suppository 1 Suppository(s) Rectal two times a day  influenza  Vaccine (HIGH DOSE) 0.7 milliLiter(s) IntraMuscular once  levothyroxine 150 MICROGram(s) Oral daily  mesalamine Suppository 1000 milliGRAM(s) Rectal at bedtime  midodrine 10 milliGRAM(s) Oral every 8 hours  pantoprazole   Suspension 40 milliGRAM(s) Enteral Tube daily  polyethylene glycol 3350 17 Gram(s) Oral daily  senna 2 Tablet(s) Oral at bedtime  vancomycin  IVPB 750 milliGRAM(s) IV Intermittent <User Schedule>    MEDICATIONS  (PRN):  acetaminophen     Tablet .. 650 milliGRAM(s) Oral every 6 hours PRN Mild Pain (1 - 3)  aluminum hydroxide/magnesium hydroxide/simethicone Suspension 10 milliLiter(s) Oral every 6 hours PRN dyspepsia  sodium chloride 0.9% lock flush 10 milliLiter(s) IV Push every 1 hour PRN Pre/post blood products, medications, blood draw, and to maintain line patency      Allergies    levofloxacin (Unknown)  alfuzosin (Unknown)  tamsulosin (Unknown)  Myrbetriq (Unknown)    Intolerances        Vital Signs Last 24 Hrs  T(C): 36.9 (10 Oct 2023 12:24), Max: 36.9 (10 Oct 2023 12:24)  T(F): 98.4 (10 Oct 2023 12:24), Max: 98.4 (10 Oct 2023 12:24)  HR: 99 (10 Oct 2023 12:24) (93 - 107)  BP: 103/68 (10 Oct 2023 12:24) (103/68 - 132/68)  BP(mean): --  RR: 18 (10 Oct 2023 12:24) (17 - 18)  SpO2: 94% (10 Oct 2023 12:24) (93% - 94%)    Parameters below as of 10 Oct 2023 12:24  Patient On (Oxygen Delivery Method): room air      I&O's Summary        PHYSICAL EXAM:  GENERAL: NAD  HEENT:  AT/NC, moist mucous membranes  CHEST/LUNG:  CTA b/l, no rales, wheezes, or rhonchi,  normal respiratory effort, no intercostal retractions  HEART:  irregular HR, S1, S2; no pitting edema  ABDOMEN:  BS+, soft, nontender, nondistended  MSK/EXTREMITIES: 2+ peripheral pulses, b/l upper ext edema, nontender, edema rt foot  NERVOUS SYSTEM: awake, responds to some questions, AOx0-1      LABS: Personally reviewed  CBC                        7.9    17.93 )-----------( 119      ( 10 Oct 2023 06:36 )             23.3     CMP  10-10    136  |  100  |  56  ----------------------------<  115  4.4   |  27  |  4.17    Ca    9.6      10 Oct 2023 06:36  Phos  3.2     10-09  Mg     1.9     10-09    TPro  6.2  /  Alb  2.1  /  TBili  0.8  /  DBili  x   /  AST  17  /  ALT  15  /  AlkPhos  225  10-10          PT/INR - ( 08 Oct 2023 06:30 )   PT: 12.1 sec;   INR: 1.06 ratio         PTT - ( 08 Oct 2023 06:30 )  PTT:30.8 sec            TSH 31.921   TSH with FT4 reflex --  Total T3 --                  Urinalysis Basic - ( 10 Oct 2023 06:36 )    Color: x / Appearance: x / SG: x / pH: x  Gluc: 115 mg/dL / Ketone: x  / Bili: x / Urobili: x   Blood: x / Protein: x / Nitrite: x   Leuk Esterase: x / RBC: x / WBC x   Sq Epi: x / Non Sq Epi: x / Bacteria: x        Culture - Blood (collected 09 Oct 2023 07:00)  Source: .Blood Blood  Gram Stain (10 Oct 2023 09:30):    Growth in aerobic bottle: Gram Positive Cocci in Clusters    Growth in anaerobic bottle: Gram Positive Cocci in Clusters  Preliminary Report (10 Oct 2023 09:31):    Growth in aerobic bottle: Gram Positive Cocci in Clusters    Growth in anaerobic bottle: Gram Positive Cocci in Clusters    Culture - Blood (collected 09 Oct 2023 07:00)  Source: .Blood Blood  Gram Stain (10 Oct 2023 06:14):    Growth in aerobic bottle: Gram Positive Cocci in Clusters    Growth in anaerobic bottle: Gram Positive Cocci in Clusters  Preliminary Report (10 Oct 2023 06:14):    Growth in aerobic bottle: Gram Positive Cocci in Clusters    Growth in anaerobic bottle: Gram Positive Cocci in Clusters    Culture - Blood (collected 06 Oct 2023 10:20)  Source: .Blood Blood-Peripheral  Gram Stain (07 Oct 2023 04:46):    Growth in aerobic bottle: Gram Positive Cocci in Clusters    Growth in anaerobic bottle: Gram Positive Cocci in Clusters  Final Report (08 Oct 2023 08:18):    Growth in aerobic and anaerobic bottles: Methicillin Resistant    Staphylococcus aureus    See previous culture [id number]    Culture - Blood (collected 04 Oct 2023 06:00)  Source: .Blood Blood  Gram Stain (05 Oct 2023 07:40):    Growth in aerobic bottle: Gram Positive Cocci in Clusters    Growth in anaerobic bottle: Gram Positive Cocci in Clusters  Final Report (06 Oct 2023 14:49):    Growth in aerobic and anaerobic bottles: Methicillin Resistant    Staphylococcus aureus    See previous culture [id number]    Culture - Blood (collected 04 Oct 2023 06:00)  Source: .Blood Blood  Gram Stain (05 Oct 2023 08:18):    Growth in aerobic bottle: Gram Positive Cocci in Clusters    Growth in anaerobic bottle: Gram Positive Cocci in Clusters  Final Report (06 Oct 2023 14:47):    Growth in aerobic and anaerobic bottles: Methicillin Resistant    Staphylococcus aureus  Organism: Methicillin resistant Staphylococcus aureus (06 Oct 2023 14:47)  Organism: Methicillin resistant Staphylococcus aureus (06 Oct 2023 14:47)      Method Type: LEWIS      -  Ampicillin/Sulbactam: R <=8/4      -  Cefazolin: R >16      -  Clindamycin: R >4      -  Daptomycin: S 1      -  Erythromycin: R >4      -  Gentamicin: S <=1 Should not be used as monotherapy      -  Linezolid: S 2      -  Oxacillin: R >2      -  Penicillin: R 8      -  Rifampin: S <=1 Should not be used as monotherapy      -  Tetracycline: S 2      -  Trimethoprim/Sulfamethoxazole: S <=0.5/9.5      -  Vancomycin: S 2            Culture - Blood (collected 10-09-23 @ 07:00)  Source: .Blood Blood  Gram Stain (10-10-23 @ 09:30):    Growth in aerobic bottle: Gram Positive Cocci in Clusters    Growth in anaerobic bottle: Gram Positive Cocci in Clusters  Preliminary Report (10-10-23 @ 09:31):    Growth in aerobic bottle: Gram Positive Cocci in Clusters    Growth in anaerobic bottle: Gram Positive Cocci in Clusters    Culture - Blood (collected 10-09-23 @ 07:00)  Source: .Blood Blood  Gram Stain (10-10-23 @ 06:14):    Growth in aerobic bottle: Gram Positive Cocci in Clusters    Growth in anaerobic bottle: Gram Positive Cocci in Clusters  Preliminary Report (10-10-23 @ 06:14):    Growth in aerobic bottle: Gram Positive Cocci in Clusters    Growth in anaerobic bottle: Gram Positive Cocci in Clusters    Culture - Blood (collected 10-06-23 @ 10:20)  Source: .Blood Blood-Peripheral  Gram Stain (10-07-23 @ 04:46):    Growth in aerobic bottle: Gram Positive Cocci in Clusters    Growth in anaerobic bottle: Gram Positive Cocci in Clusters  Final Report (10-08-23 @ 08:18):    Growth in aerobic and anaerobic bottles: Methicillin Resistant    Staphylococcus aureus    See previous culture [id number]    Culture - Blood (collected 10-04-23 @ 06:00)  Source: .Blood Blood  Gram Stain (10-05-23 @ 07:40):    Growth in aerobic bottle: Gram Positive Cocci in Clusters    Growth in anaerobic bottle: Gram Positive Cocci in Clusters  Final Report (10-06-23 @ 14:49):    Growth in aerobic and anaerobic bottles: Methicillin Resistant    Staphylococcus aureus    See previous culture [id number]    Culture - Blood (collected 10-04-23 @ 06:00)  Source: .Blood Blood  Gram Stain (10-05-23 @ 08:18):    Growth in aerobic bottle: Gram Positive Cocci in Clusters    Growth in anaerobic bottle: Gram Positive Cocci in Clusters  Final Report (10-06-23 @ 14:47):    Growth in aerobic and anaerobic bottles: Methicillin Resistant    Staphylococcus aureus  Organism: Methicillin resistant Staphylococcus aureus (10-06-23 @ 14:47)  Organism: Methicillin resistant Staphylococcus aureus (10-06-23 @ 14:47)      Method Type: LEWIS      -  Ampicillin/Sulbactam: R <=8/4      -  Cefazolin: R >16      -  Clindamycin: R >4      -  Daptomycin: S 1      -  Erythromycin: R >4      -  Gentamicin: S <=1 Should not be used as monotherapy      -  Linezolid: S 2      -  Oxacillin: R >2      -  Penicillin: R 8      -  Rifampin: S <=1 Should not be used as monotherapy      -  Tetracycline: S 2      -  Trimethoprim/Sulfamethoxazole: S <=0.5/9.5      -  Vancomycin: S 2        RADIOLOGY & ADDITIONAL TESTS: Personally reviewed.   < from: US Duplex Venous Upper Ext Complete, Bilateral (10.10.23 @ 10:09) >  No evidence of deep venous thrombosis in either upper extremity. Please   note:The right internal jugular and right subclavian vein are unable to   be assessed due to the presence of dressings/vascular access devices.    < end of copied text >      Consultant(s) Notes Reviewed:  [x] YES  [ ] NO   Discussed with TRACEY/PEPPER, RN

## 2023-10-10 NOTE — PROGRESS NOTE ADULT - SUBJECTIVE AND OBJECTIVE BOX
JANEE ARCEO, 78y Male  MRN: [id number]  ATTENDING: Martin Almaguer    HPI:  78M, PMHx MVR, PPM, s/p removal for MSSA infection, ESRD, on dialysis, Clostridium bacteremia in August (proctitis source) admitted with fever (102.2) found with high-grade sustained MRSA bacteremia.  On 10/6/2023 patient had permacath removed.  Brought spectrum antibiotic coverage.  Not a candidate for surgical valve removal.  Patient is poor historian.  Hematology consulted for anemia and thrombocytopenia.    MEDICATIONS:  acetaminophen     Tablet .. 650 milliGRAM(s) Oral every 6 hours PRN  aluminum hydroxide/magnesium hydroxide/simethicone Suspension 10 milliLiter(s) Oral every 6 hours PRN  aspirin  chewable 81 milliGRAM(s) Oral daily  ceftaroline fosamil IVPB 200 milliGRAM(s) IV Intermittent once  ceftaroline fosamil IVPB      ceftaroline fosamil IVPB 200 milliGRAM(s) IV Intermittent every 8 hours  chlorhexidine 2% Cloths 1 Application(s) Topical <User Schedule>  epoetin val (PROCRIT) Injectable 07311 Unit(s) IV Push <User Schedule>  folic acid 1 milliGRAM(s) Oral daily  heparin   Injectable 5000 Unit(s) SubCutaneous every 12 hours  hydrocortisone hemorrhoidal Suppository 1 Suppository(s) Rectal two times a day  influenza  Vaccine (HIGH DOSE) 0.7 milliLiter(s) IntraMuscular once  levothyroxine 137 MICROGram(s) Oral daily  mesalamine Suppository 1000 milliGRAM(s) Rectal at bedtime  midodrine 10 milliGRAM(s) Oral every 8 hours  pantoprazole   Suspension 40 milliGRAM(s) Enteral Tube daily  polyethylene glycol 3350 17 Gram(s) Oral daily  senna 2 Tablet(s) Oral at bedtime  sodium chloride 0.9% lock flush 10 milliLiter(s) IV Push every 1 hour PRN  vancomycin  IVPB 750 milliGRAM(s) IV Intermittent <User Schedule>    All other medications reviewed.    SUBJECTIVE:  No active bleeding; no events overnight.    VITALS:  T(C): 36.6 (10-09-23 @ 12:30), Max: 37.7 (10-08-23 @ 21:19)  T(F): 97.9 (10-09-23 @ 12:30), Max: 99.9 (10-08-23 @ 21:19)  HR: 93 (10-09-23 @ 13:07) (74 - 122)  BP: 89/52 (10-09-23 @ 13:07) (89/52 - 127/66)      PHYSICAL EXAM:  Constitutional: alert, well developed  HEENT: normocephalic, anicteric sclerae, no mucositis or thrush  Respiratory: bilateral clear to auscultation anteriorly  Cardiovascular : S1, S2 regular, rhythmic, no murmurs, gallops or rubs  Abdomen: soft, distended, + normoactive BS, no palpable HS- megaly  Extremities: no tenderness;  -c/c/e, pulses equal bilaterally    LABS:  (10-09) WBC: 18.06 K/uL,Hemoglobin: 7.8 g/dL, Hematocrit: 22.2 %,  Platelet: 111 K/uL  (10-09) Na: 134 mmol/L ; K: 4.1 mmol/L ; BUN: 82 mg/dL ; Cr: 5.27 mg/dL.    RADIOLOGY:  ACC: [id number] EXAM:  CT BRAIN   ORDERED BY: JULIA CRUZ     PROCEDURE DATE:  10/04/2023          INTERPRETATION:  CLINICAL INFORMATION: Altered mental status.    TECHNIQUE:  Noncontrast axial CT images were acquired through the head.  Sagittal and coronal reformats were performed.    COMPARISON STUDY: None    FINDINGS:  There is no CT evidence of acute intracranial hemorrhage, mass effect or   midline shift.  There is no acute loss of gray matter-white matter   differentiation.    Slight prominence of the ventricles is compatible with mild age-related   involutional changes.  No clinically significant chronic microvascular   ischemic disease or other white matter pathology is visualized by CT   technique.      The paranasal sinuses and mastoids are grossly clear.    The patient is status post intraocular lens replacement bilaterally..    The calvarium and skull base appear within normal limits.    IMPRESSION:  No CT evidence of acute intracranial pathology.     JANEE ARCEO, 78y Male  MRN: [id number]  ATTENDING: Martin Almaguer    HPI:  78M, PMHx MVR, PPM, s/p removal for MSSA infection, ESRD, on dialysis, Clostridium bacteremia in August (proctitis source) admitted with fever (102.2) found with high-grade sustained MRSA bacteremia.  On 10/6/2023 patient had permacath removed.  Brought spectrum antibiotic coverage.  Not a candidate for surgical valve removal.  Patient is poor historian.  Hematology consulted for anemia and thrombocytopenia.    MEDICATIONS:  acetaminophen     Tablet .. 650 milliGRAM(s) Oral every 6 hours PRN  aluminum hydroxide/magnesium hydroxide/simethicone Suspension 10 milliLiter(s) Oral every 6 hours PRN  aspirin  chewable 81 milliGRAM(s) Oral daily  ceftaroline fosamil IVPB 200 milliGRAM(s) IV Intermittent once  ceftaroline fosamil IVPB      ceftaroline fosamil IVPB 200 milliGRAM(s) IV Intermittent every 8 hours  chlorhexidine 2% Cloths 1 Application(s) Topical <User Schedule>  epoetin val (PROCRIT) Injectable 66325 Unit(s) IV Push <User Schedule>  folic acid 1 milliGRAM(s) Oral daily  heparin   Injectable 5000 Unit(s) SubCutaneous every 12 hours  hydrocortisone hemorrhoidal Suppository 1 Suppository(s) Rectal two times a day  influenza  Vaccine (HIGH DOSE) 0.7 milliLiter(s) IntraMuscular once  levothyroxine 137 MICROGram(s) Oral daily  mesalamine Suppository 1000 milliGRAM(s) Rectal at bedtime  midodrine 10 milliGRAM(s) Oral every 8 hours  pantoprazole   Suspension 40 milliGRAM(s) Enteral Tube daily  polyethylene glycol 3350 17 Gram(s) Oral daily  senna 2 Tablet(s) Oral at bedtime  sodium chloride 0.9% lock flush 10 milliLiter(s) IV Push every 1 hour PRN  vancomycin  IVPB 750 milliGRAM(s) IV Intermittent <User Schedule>    All other medications reviewed.    SUBJECTIVE:  No active bleeding; no events overnight.    VITALS:  T(C): 36.6 (10-09-23 @ 12:30), Max: 37.7 (10-08-23 @ 21:19)  T(F): 97.9 (10-09-23 @ 12:30), Max: 99.9 (10-08-23 @ 21:19)  HR: 93 (10-09-23 @ 13:07) (74 - 122)  BP: 89/52 (10-09-23 @ 13:07) (89/52 - 127/66)      PHYSICAL EXAM:  Constitutional: alert, well developed  HEENT: normocephalic, anicteric sclerae, no mucositis or thrush  Respiratory: bilateral clear to auscultation anteriorly  Cardiovascular : S1, S2 regular, rhythmic, no murmurs, gallops or rubs  Abdomen: soft, distended, + normoactive BS, no palpable HS- megaly  Extremities: no tenderness;  -c/c/e, pulses equal bilaterally    LABS:  (10-09) WBC: 18.06 K/uL,Hemoglobin: 7.8 g/dL, Hematocrit: 22.2 %,  Platelet: 111 K/uL  (10-09) Na: 134 mmol/L ; K: 4.1 mmol/L ; BUN: 82 mg/dL ; Cr: 5.27 mg/dL.    RADIOLOGY:  ACC: [id number] EXAM:  CT BRAIN   ORDERED BY: JULIA CRUZ     PROCEDURE DATE:  10/04/2023          INTERPRETATION:  CLINICAL INFORMATION: Altered mental status.    TECHNIQUE:  Noncontrast axial CT images were acquired through the head.  Sagittal and coronal reformats were performed.    COMPARISON STUDY: None    FINDINGS:  There is no CT evidence of acute intracranial hemorrhage, mass effect or   midline shift.  There is no acute loss of gray matter-white matter   differentiation.    Slight prominence of the ventricles is compatible with mild age-related   involutional changes.  No clinically significant chronic microvascular   ischemic disease or other white matter pathology is visualized by CT   technique.      The paranasal sinuses and mastoids are grossly clear.    The patient is status post intraocular lens replacement bilaterally..    The calvarium and skull base appear within normal limits.    IMPRESSION:  No CT evidence of acute intracranial pathology.     JANEE ARCEO, 78y Male  MRN: [id number]  ATTENDING: Martin Almaguer    HPI:  78M, PMHx MVR, PPM, s/p removal for MSSA infection, ESRD, on dialysis, Clostridium bacteremia in August (proctitis source) admitted with fever (102.2) found with high-grade sustained MRSA bacteremia.  On 10/6/2023 patient had permacath removed.  Brought spectrum antibiotic coverage.  Not a candidate for surgical valve removal.  Patient is poor historian.  Hematology consulted for anemia and thrombocytopenia.    MEDICATIONS:  acetaminophen     Tablet .. 650 milliGRAM(s) Oral every 6 hours PRN  aluminum hydroxide/magnesium hydroxide/simethicone Suspension 10 milliLiter(s) Oral every 6 hours PRN  aspirin  chewable 81 milliGRAM(s) Oral daily  ceftaroline fosamil IVPB 200 milliGRAM(s) IV Intermittent once  ceftaroline fosamil IVPB      ceftaroline fosamil IVPB 200 milliGRAM(s) IV Intermittent every 8 hours  chlorhexidine 2% Cloths 1 Application(s) Topical <User Schedule>  epoetin val (PROCRIT) Injectable 69541 Unit(s) IV Push <User Schedule>  folic acid 1 milliGRAM(s) Oral daily  heparin   Injectable 5000 Unit(s) SubCutaneous every 12 hours  hydrocortisone hemorrhoidal Suppository 1 Suppository(s) Rectal two times a day  influenza  Vaccine (HIGH DOSE) 0.7 milliLiter(s) IntraMuscular once  levothyroxine 137 MICROGram(s) Oral daily  mesalamine Suppository 1000 milliGRAM(s) Rectal at bedtime  midodrine 10 milliGRAM(s) Oral every 8 hours  pantoprazole   Suspension 40 milliGRAM(s) Enteral Tube daily  polyethylene glycol 3350 17 Gram(s) Oral daily  senna 2 Tablet(s) Oral at bedtime  sodium chloride 0.9% lock flush 10 milliLiter(s) IV Push every 1 hour PRN  vancomycin  IVPB 750 milliGRAM(s) IV Intermittent <User Schedule>    All other medications reviewed.    SUBJECTIVE:  No active bleeding; no events overnight.    VITALS:  T(C): 36.6 (10-09-23 @ 12:30), Max: 37.7 (10-08-23 @ 21:19)  T(F): 97.9 (10-09-23 @ 12:30), Max: 99.9 (10-08-23 @ 21:19)  HR: 93 (10-09-23 @ 13:07) (74 - 122)  BP: 89/52 (10-09-23 @ 13:07) (89/52 - 127/66)      PHYSICAL EXAM:  Constitutional: alert, well developed  HEENT: normocephalic, anicteric sclerae, no mucositis or thrush  Respiratory: bilateral clear to auscultation anteriorly  Cardiovascular : S1, S2 regular, rhythmic, no murmurs, gallops or rubs  Abdomen: soft, distended, + normoactive BS, no palpable HS- megaly  Extremities: no tenderness;  -c/c/e, pulses equal bilaterally    LABS:  (10-09) WBC: 18.06 K/uL,Hemoglobin: 7.8 g/dL, Hematocrit: 22.2 %,  Platelet: 111 K/uL  (10-09) Na: 134 mmol/L ; K: 4.1 mmol/L ; BUN: 82 mg/dL ; Cr: 5.27 mg/dL.    RADIOLOGY:  ACC: [id number] EXAM:  CT BRAIN   ORDERED BY: JULIA CRUZ     PROCEDURE DATE:  10/04/2023          INTERPRETATION:  CLINICAL INFORMATION: Altered mental status.    TECHNIQUE:  Noncontrast axial CT images were acquired through the head.  Sagittal and coronal reformats were performed.    COMPARISON STUDY: None    FINDINGS:  There is no CT evidence of acute intracranial hemorrhage, mass effect or   midline shift.  There is no acute loss of gray matter-white matter   differentiation.    Slight prominence of the ventricles is compatible with mild age-related   involutional changes.  No clinically significant chronic microvascular   ischemic disease or other white matter pathology is visualized by CT   technique.      The paranasal sinuses and mastoids are grossly clear.    The patient is status post intraocular lens replacement bilaterally..    The calvarium and skull base appear within normal limits.    IMPRESSION:  No CT evidence of acute intracranial pathology.

## 2023-10-10 NOTE — PROGRESS NOTE ADULT - SUBJECTIVE AND OBJECTIVE BOX
INTERVAL HPI/OVERNIGHT EVENTS: pat is lying in bed comfortable       Antimicrobial:  ceftaroline fosamil IVPB 200 milliGRAM(s) IV Intermittent every 8 hours  ceftaroline fosamil IVPB      DAPTOmycin IVPB 600 milliGRAM(s) IV Intermittent every 48 hours    Cardiovascular:  midodrine 10 milliGRAM(s) Oral every 8 hours    Pulmonary:    Hematalogic:  aspirin  chewable 81 milliGRAM(s) Oral daily    Other:  acetaminophen     Tablet .. 650 milliGRAM(s) Oral every 6 hours PRN  aluminum hydroxide/magnesium hydroxide/simethicone Suspension 10 milliLiter(s) Oral every 6 hours PRN  chlorhexidine 2% Cloths 1 Application(s) Topical <User Schedule>  epoetin val (PROCRIT) Injectable 79896 Unit(s) IV Push <User Schedule>  folic acid 1 milliGRAM(s) Oral daily  hydrocortisone hemorrhoidal Suppository 1 Suppository(s) Rectal two times a day  influenza  Vaccine (HIGH DOSE) 0.7 milliLiter(s) IntraMuscular once  levothyroxine 150 MICROGram(s) Oral daily  mesalamine Suppository 1000 milliGRAM(s) Rectal at bedtime  pantoprazole   Suspension 40 milliGRAM(s) Enteral Tube daily  polyethylene glycol 3350 17 Gram(s) Oral daily  senna 2 Tablet(s) Oral at bedtime  sodium chloride 0.9% lock flush 10 milliLiter(s) IV Push every 1 hour PRN      Drug Dosing Weight  Height (cm): 172.7 (02 Oct 2023 13:03)  Weight (kg): 72.6 (02 Oct 2023 13:03)  BMI (kg/m2): 24.3 (02 Oct 2023 13:03)  BSA (m2): 1.86 (02 Oct 2023 13:03)    CENTRAL LINE: [ ] YES [ ] NO  LOCATION:   DATE INSERTED:    SU: [ ] YES [ ] NO    DATE INSERTED:    A-LINE:  [ ] YES [ ] NO  LOCATION:   DATE INSERTED:    PMH/Social Hx/[facility] Hx -reviewed admission note, no change since admission  PAST MEDICAL & SURGICAL HISTORY:  Chronic atrial fibrillation      History of BPH      CAD (coronary artery disease)      Coronary stent patent      ESRD on dialysis      History of GI bleed      Mitral valve replaced          T(C): 36.9 (10-10-23 @ 12:24), Max: 36.9 (10-10-23 @ 12:24)  HR: 99 (10-10-23 @ 12:24)  BP: 103/68 (10-10-23 @ 12:24)  BP(mean): --  ABP: --  ABP(mean): --  RR: 18 (10-10-23 @ 12:24)  SpO2: 94% (10-10-23 @ 12:24)  Wt(kg): --                PHYSICAL EXAM:    GENERAL: No signs of distress, comfortable  HEAD: Atraumatic, Normocephalic  EYES: EOMI, PERRLA  ENMT: No erythema, exudates, or enlargement, Moist mucous membranes  NECK: Supple, normal appearance, No JVD; [x  ] central line (if applicable) RIJ Dialysis cath   CHEST/LUNG: No chest deformity, fair bilateral air entry; No rales, rhonchi, wheezing; crackles  HEART: Regular rate and rhythm; No murmurs, rubs, or gallops;   ABDOMEN: Soft, Nontender, Nondistended; Bowel sounds present  EXTREMITIES:  + Peripheral Pulses, No clubbing, cyanosis, or edema  NERVOUS SYSTEM: awake and do not follow commands   LYMPH: No lymphadenopathy noted  SKIN: No rashes or lesions; good turgor, warm, dry      LABS:  CBC Full  -  ( 10 Oct 2023 06:36 )  WBC Count : 17.93 K/uL  RBC Count : 2.58 M/uL  Hemoglobin : 7.9 g/dL  Hematocrit : 23.3 %  Platelet Count - Automated : 119 K/uL  Mean Cell Volume : 90.3 fl  Mean Cell Hemoglobin : 30.6 pg  Mean Cell Hemoglobin Concentration : 33.9 gm/dL  Auto Neutrophil # : 15.25 K/uL  Auto Lymphocyte # : 1.06 K/uL  Auto Monocyte # : 1.28 K/uL  Auto Eosinophil # : 0.08 K/uL  Auto Basophil # : 0.08 K/uL  Auto Neutrophil % : 85.2 %  Auto Lymphocyte % : 5.9 %  Auto Monocyte % : 7.1 %  Auto Eosinophil % : 0.4 %  Auto Basophil % : 0.4 %    10-10    136  |  100  |  56<H>  ----------------------------<  115<H>  4.4   |  27  |  4.17<H>    Ca    9.6      10 Oct 2023 06:36  Phos  3.2     10-09  Mg     1.9     10-09    TPro  6.2  /  Alb  2.1<L>  /  TBili  0.8  /  DBili  x   /  AST  17  /  ALT  15  /  AlkPhos  225<H>  10-10      Urinalysis Basic - ( 10 Oct 2023 06:36 )    Color: x / Appearance: x / SG: x / pH: x  Gluc: 115 mg/dL / Ketone: x  / Bili: x / Urobili: x   Blood: x / Protein: x / Nitrite: x   Leuk Esterase: x / RBC: x / WBC x   Sq Epi: x / Non Sq Epi: x / Bacteria: x      Culture Results:   Growth in aerobic bottle: Gram Positive Cocci in Clusters  Growth in anaerobic bottle: Gram Positive Cocci in Clusters (10-09 @ 07:00)  Culture Results:   Growth in aerobic bottle: Gram Positive Cocci in Clusters  Growth in anaerobic bottle: Gram Positive Cocci in Clusters (10-09 @ 07:00)      RADIOLOGY & ADDITIONAL STUDIES REVIEWED         IMPRESSION:  PAST MEDICAL & SURGICAL HISTORY:  Chronic atrial fibrillation      History of BPH      CAD (coronary artery disease)      Coronary stent patent      ESRD on dialysis      History of GI bleed      Mitral valve replaced       p/w             Assessment  - Septic shock, source suspected cardiac valves   - MRSA bacteremia   - AMS- metabolic encephelopathy  - Thrombocytopenia, suspect from Sepsis  - Underlying h/o CAD s/p PCI, BPH, ESRD on HD, s/p MVR, Afib    Plan:  - No sedation   - Antibx changed as per ID Dr lagunas  - Persistent MRSA bacteremia despite central line changed .   - ERENDIRA for valve evaluation for vegetation   - Pat may not be a surgical candidate for cardiac valve corrective surgery , if ERENDIRA is positive then will need to address goal of care and duration of iv antibx   - Consider US of kidneys for possible stones   - No CT with contrast due to BEULAH on CKD   - Cont. midodrine for now, taper as tolerated but patient on it chronically pre HD  antibiotics as per ID,   Perm cath removed 10/3/2023      spoke with Dr Lagunas and Dr Almaguer   Thrombocytopenia improving  ID, Renal, Heme f/u  On going discussion with the NO[facility], concern for possibly infective endocarditis given patient not clearing bacteremia.  may need ERENDIRA    PMD:				                   Notified(Date):  Family Updated: 	Surekha BLISS  [phone], C  [phone](preferred line)                 Date: 10/7/2023               INTERVAL HPI/OVERNIGHT EVENTS: pat is lying in bed comfortable       Antimicrobial:  ceftaroline fosamil IVPB 200 milliGRAM(s) IV Intermittent every 8 hours  ceftaroline fosamil IVPB      DAPTOmycin IVPB 600 milliGRAM(s) IV Intermittent every 48 hours    Cardiovascular:  midodrine 10 milliGRAM(s) Oral every 8 hours    Pulmonary:    Hematalogic:  aspirin  chewable 81 milliGRAM(s) Oral daily    Other:  acetaminophen     Tablet .. 650 milliGRAM(s) Oral every 6 hours PRN  aluminum hydroxide/magnesium hydroxide/simethicone Suspension 10 milliLiter(s) Oral every 6 hours PRN  chlorhexidine 2% Cloths 1 Application(s) Topical <User Schedule>  epoetin val (PROCRIT) Injectable 97807 Unit(s) IV Push <User Schedule>  folic acid 1 milliGRAM(s) Oral daily  hydrocortisone hemorrhoidal Suppository 1 Suppository(s) Rectal two times a day  influenza  Vaccine (HIGH DOSE) 0.7 milliLiter(s) IntraMuscular once  levothyroxine 150 MICROGram(s) Oral daily  mesalamine Suppository 1000 milliGRAM(s) Rectal at bedtime  pantoprazole   Suspension 40 milliGRAM(s) Enteral Tube daily  polyethylene glycol 3350 17 Gram(s) Oral daily  senna 2 Tablet(s) Oral at bedtime  sodium chloride 0.9% lock flush 10 milliLiter(s) IV Push every 1 hour PRN      Drug Dosing Weight  Height (cm): 172.7 (02 Oct 2023 13:03)  Weight (kg): 72.6 (02 Oct 2023 13:03)  BMI (kg/m2): 24.3 (02 Oct 2023 13:03)  BSA (m2): 1.86 (02 Oct 2023 13:03)    CENTRAL LINE: [ ] YES [ ] NO  LOCATION:   DATE INSERTED:    SU: [ ] YES [ ] NO    DATE INSERTED:    A-LINE:  [ ] YES [ ] NO  LOCATION:   DATE INSERTED:    PMH/Social Hx/[facility] Hx -reviewed admission note, no change since admission  PAST MEDICAL & SURGICAL HISTORY:  Chronic atrial fibrillation      History of BPH      CAD (coronary artery disease)      Coronary stent patent      ESRD on dialysis      History of GI bleed      Mitral valve replaced          T(C): 36.9 (10-10-23 @ 12:24), Max: 36.9 (10-10-23 @ 12:24)  HR: 99 (10-10-23 @ 12:24)  BP: 103/68 (10-10-23 @ 12:24)  BP(mean): --  ABP: --  ABP(mean): --  RR: 18 (10-10-23 @ 12:24)  SpO2: 94% (10-10-23 @ 12:24)  Wt(kg): --                PHYSICAL EXAM:    GENERAL: No signs of distress, comfortable  HEAD: Atraumatic, Normocephalic  EYES: EOMI, PERRLA  ENMT: No erythema, exudates, or enlargement, Moist mucous membranes  NECK: Supple, normal appearance, No JVD; [x  ] central line (if applicable) RIJ Dialysis cath   CHEST/LUNG: No chest deformity, fair bilateral air entry; No rales, rhonchi, wheezing; crackles  HEART: Regular rate and rhythm; No murmurs, rubs, or gallops;   ABDOMEN: Soft, Nontender, Nondistended; Bowel sounds present  EXTREMITIES:  + Peripheral Pulses, No clubbing, cyanosis, or edema  NERVOUS SYSTEM: awake and do not follow commands   LYMPH: No lymphadenopathy noted  SKIN: No rashes or lesions; good turgor, warm, dry      LABS:  CBC Full  -  ( 10 Oct 2023 06:36 )  WBC Count : 17.93 K/uL  RBC Count : 2.58 M/uL  Hemoglobin : 7.9 g/dL  Hematocrit : 23.3 %  Platelet Count - Automated : 119 K/uL  Mean Cell Volume : 90.3 fl  Mean Cell Hemoglobin : 30.6 pg  Mean Cell Hemoglobin Concentration : 33.9 gm/dL  Auto Neutrophil # : 15.25 K/uL  Auto Lymphocyte # : 1.06 K/uL  Auto Monocyte # : 1.28 K/uL  Auto Eosinophil # : 0.08 K/uL  Auto Basophil # : 0.08 K/uL  Auto Neutrophil % : 85.2 %  Auto Lymphocyte % : 5.9 %  Auto Monocyte % : 7.1 %  Auto Eosinophil % : 0.4 %  Auto Basophil % : 0.4 %    10-10    136  |  100  |  56<H>  ----------------------------<  115<H>  4.4   |  27  |  4.17<H>    Ca    9.6      10 Oct 2023 06:36  Phos  3.2     10-09  Mg     1.9     10-09    TPro  6.2  /  Alb  2.1<L>  /  TBili  0.8  /  DBili  x   /  AST  17  /  ALT  15  /  AlkPhos  225<H>  10-10      Urinalysis Basic - ( 10 Oct 2023 06:36 )    Color: x / Appearance: x / SG: x / pH: x  Gluc: 115 mg/dL / Ketone: x  / Bili: x / Urobili: x   Blood: x / Protein: x / Nitrite: x   Leuk Esterase: x / RBC: x / WBC x   Sq Epi: x / Non Sq Epi: x / Bacteria: x      Culture Results:   Growth in aerobic bottle: Gram Positive Cocci in Clusters  Growth in anaerobic bottle: Gram Positive Cocci in Clusters (10-09 @ 07:00)  Culture Results:   Growth in aerobic bottle: Gram Positive Cocci in Clusters  Growth in anaerobic bottle: Gram Positive Cocci in Clusters (10-09 @ 07:00)      RADIOLOGY & ADDITIONAL STUDIES REVIEWED         IMPRESSION:  PAST MEDICAL & SURGICAL HISTORY:  Chronic atrial fibrillation      History of BPH      CAD (coronary artery disease)      Coronary stent patent      ESRD on dialysis      History of GI bleed      Mitral valve replaced       p/w             Assessment  - Septic shock, source suspected cardiac valves   - MRSA bacteremia   - AMS- metabolic encephelopathy  - Thrombocytopenia, suspect from Sepsis  - Underlying h/o CAD s/p PCI, BPH, ESRD on HD, s/p MVR, Afib    Plan:  - No sedation   - Antibx changed as per ID Dr lagunas  - Persistent MRSA bacteremia despite central line changed .   - ERENDIRA for valve evaluation for vegetation   - Pat may not be a surgical candidate for cardiac valve corrective surgery , if ERENDIRA is positive then will need to address goal of care and duration of iv antibx   - Consider US of kidneys for possible stones   - No CT with contrast due to BEULAH on CKD   - Cont. midodrine for now, taper as tolerated but patient on it chronically pre HD  antibiotics as per ID,   Perm cath removed 10/3/2023      spoke with Dr Lagunas and Dr Almaguer   Thrombocytopenia improving  ID, Renal, Heme f/u  On going discussion with the [facility], concern for possibly infective endocarditis given patient not clearing bacteremia.  may need ERENDIRA    PMD:				                   Notified(Date):  Family Updated: 	Surekha BLISS  [phone], C  [phone](preferred line)                 Date: 10/7/2023               INTERVAL HPI/OVERNIGHT EVENTS: pat is lying in bed comfortable       Antimicrobial:  ceftaroline fosamil IVPB 200 milliGRAM(s) IV Intermittent every 8 hours  ceftaroline fosamil IVPB      DAPTOmycin IVPB 600 milliGRAM(s) IV Intermittent every 48 hours    Cardiovascular:  midodrine 10 milliGRAM(s) Oral every 8 hours    Pulmonary:    Hematalogic:  aspirin  chewable 81 milliGRAM(s) Oral daily    Other:  acetaminophen     Tablet .. 650 milliGRAM(s) Oral every 6 hours PRN  aluminum hydroxide/magnesium hydroxide/simethicone Suspension 10 milliLiter(s) Oral every 6 hours PRN  chlorhexidine 2% Cloths 1 Application(s) Topical <User Schedule>  epoetin val (PROCRIT) Injectable 50752 Unit(s) IV Push <User Schedule>  folic acid 1 milliGRAM(s) Oral daily  hydrocortisone hemorrhoidal Suppository 1 Suppository(s) Rectal two times a day  influenza  Vaccine (HIGH DOSE) 0.7 milliLiter(s) IntraMuscular once  levothyroxine 150 MICROGram(s) Oral daily  mesalamine Suppository 1000 milliGRAM(s) Rectal at bedtime  pantoprazole   Suspension 40 milliGRAM(s) Enteral Tube daily  polyethylene glycol 3350 17 Gram(s) Oral daily  senna 2 Tablet(s) Oral at bedtime  sodium chloride 0.9% lock flush 10 milliLiter(s) IV Push every 1 hour PRN      Drug Dosing Weight  Height (cm): 172.7 (02 Oct 2023 13:03)  Weight (kg): 72.6 (02 Oct 2023 13:03)  BMI (kg/m2): 24.3 (02 Oct 2023 13:03)  BSA (m2): 1.86 (02 Oct 2023 13:03)    CENTRAL LINE: [ ] YES [ ] NO  LOCATION:   DATE INSERTED:    SU: [ ] YES [ ] NO    DATE INSERTED:    A-LINE:  [ ] YES [ ] NO  LOCATION:   DATE INSERTED:    PMH/Social Hx/[facility] Hx -reviewed admission note, no change since admission  PAST MEDICAL & SURGICAL HISTORY:  Chronic atrial fibrillation      History of BPH      CAD (coronary artery disease)      Coronary stent patent      ESRD on dialysis      History of GI bleed      Mitral valve replaced          T(C): 36.9 (10-10-23 @ 12:24), Max: 36.9 (10-10-23 @ 12:24)  HR: 99 (10-10-23 @ 12:24)  BP: 103/68 (10-10-23 @ 12:24)  BP(mean): --  ABP: --  ABP(mean): --  RR: 18 (10-10-23 @ 12:24)  SpO2: 94% (10-10-23 @ 12:24)  Wt(kg): --                PHYSICAL EXAM:    GENERAL: No signs of distress, comfortable  HEAD: Atraumatic, Normocephalic  EYES: EOMI, PERRLA  ENMT: No erythema, exudates, or enlargement, Moist mucous membranes  NECK: Supple, normal appearance, No JVD; [x  ] central line (if applicable) RIJ Dialysis cath   CHEST/LUNG: No chest deformity, fair bilateral air entry; No rales, rhonchi, wheezing; crackles  HEART: Regular rate and rhythm; No murmurs, rubs, or gallops;   ABDOMEN: Soft, Nontender, Nondistended; Bowel sounds present  EXTREMITIES:  + Peripheral Pulses, No clubbing, cyanosis, or edema  NERVOUS SYSTEM: awake and do not follow commands   LYMPH: No lymphadenopathy noted  SKIN: No rashes or lesions; good turgor, warm, dry      LABS:  CBC Full  -  ( 10 Oct 2023 06:36 )  WBC Count : 17.93 K/uL  RBC Count : 2.58 M/uL  Hemoglobin : 7.9 g/dL  Hematocrit : 23.3 %  Platelet Count - Automated : 119 K/uL  Mean Cell Volume : 90.3 fl  Mean Cell Hemoglobin : 30.6 pg  Mean Cell Hemoglobin Concentration : 33.9 gm/dL  Auto Neutrophil # : 15.25 K/uL  Auto Lymphocyte # : 1.06 K/uL  Auto Monocyte # : 1.28 K/uL  Auto Eosinophil # : 0.08 K/uL  Auto Basophil # : 0.08 K/uL  Auto Neutrophil % : 85.2 %  Auto Lymphocyte % : 5.9 %  Auto Monocyte % : 7.1 %  Auto Eosinophil % : 0.4 %  Auto Basophil % : 0.4 %    10-10    136  |  100  |  56<H>  ----------------------------<  115<H>  4.4   |  27  |  4.17<H>    Ca    9.6      10 Oct 2023 06:36  Phos  3.2     10-09  Mg     1.9     10-09    TPro  6.2  /  Alb  2.1<L>  /  TBili  0.8  /  DBili  x   /  AST  17  /  ALT  15  /  AlkPhos  225<H>  10-10      Urinalysis Basic - ( 10 Oct 2023 06:36 )    Color: x / Appearance: x / SG: x / pH: x  Gluc: 115 mg/dL / Ketone: x  / Bili: x / Urobili: x   Blood: x / Protein: x / Nitrite: x   Leuk Esterase: x / RBC: x / WBC x   Sq Epi: x / Non Sq Epi: x / Bacteria: x      Culture Results:   Growth in aerobic bottle: Gram Positive Cocci in Clusters  Growth in anaerobic bottle: Gram Positive Cocci in Clusters (10-09 @ 07:00)  Culture Results:   Growth in aerobic bottle: Gram Positive Cocci in Clusters  Growth in anaerobic bottle: Gram Positive Cocci in Clusters (10-09 @ 07:00)      RADIOLOGY & ADDITIONAL STUDIES REVIEWED         IMPRESSION:  PAST MEDICAL & SURGICAL HISTORY:  Chronic atrial fibrillation      History of BPH      CAD (coronary artery disease)      Coronary stent patent      ESRD on dialysis      History of GI bleed      Mitral valve replaced       p/w             Assessment  - Septic shock, source suspected cardiac valves   - MRSA bacteremia   - AMS- metabolic encephelopathy  - Thrombocytopenia, suspect from Sepsis  - Underlying h/o CAD s/p PCI, BPH, ESRD on HD, s/p MVR, Afib    Plan:  - No sedation   - Antibx changed as per ID Dr lagunas  - Persistent MRSA bacteremia despite central line changed .   - ERENDIRA for valve evaluation for vegetation   - Pat may not be a surgical candidate for cardiac valve corrective surgery , if ERENDIRA is positive then will need to address goal of care and duration of iv antibx   - Consider US of kidneys for possible stones   - No CT with contrast due to BEULAH on CKD   - Cont. midodrine for now, taper as tolerated but patient on it chronically pre HD  antibiotics as per ID,   Perm cath removed 10/3/2023      spoke with Dr Lagunas and Dr Almaguer   Thrombocytopenia improving  ID, Renal, Heme f/u  On going discussion with the NOK, concern for possibly infective endocarditis given patient not clearing bacteremia.  may need ERENDIRA    PMD:				                   Notified(Date):  Family Updated: 	Surekha BLISS  [phone], C  [phone](preferred line)                 Date: 10/7/2023

## 2023-10-10 NOTE — PROGRESS NOTE ADULT - SUBJECTIVE AND OBJECTIVE BOX
Resting, on RA    Vital Signs Last 24 Hrs  T(C): 36.9 (10-10-23 @ 12:24), Max: 36.9 (10-10-23 @ 12:24)  T(F): 98.4 (10-10-23 @ 12:24), Max: 98.4 (10-10-23 @ 12:24)  HR: 99 (10-10-23 @ 12:24) (99 - 107)  BP: 103/68 (10-10-23 @ 12:24) (103/68 - 132/68)  RR: 18 (10-10-23 @ 12:24) (18 - 18)  SpO2: 94% (10-10-23 @ 12:24) (94% - 94%)    s1s2  b/l air entry  soft, ND  sm edema, LUE AVF + bruit                                                                    7.9    17.93 )-----------( 119      ( 10 Oct 2023 06:36 )             23.3     10 Oct 2023 06:36    136    |  100    |  56     ----------------------------<  115    4.4     |  27     |  4.17     Ca    9.6        10 Oct 2023 06:36  Phos  3.2       09 Oct 2023 07:00  Mg     1.9       09 Oct 2023 07:00    TPro  6.2    /  Alb  2.1    /  TBili  0.8    /  DBili  x      /  AST  17     /  ALT  15     /  AlkPhos  225    10 Oct 2023 06:36    LIVER FUNCTIONS - ( 10 Oct 2023 06:36 )  Alb: 2.1 g/dL / Pro: 6.2 g/dL / ALK PHOS: 225 U/L / ALT: 15 U/L / AST: 17 U/L / GGT: x           Culture - Blood (collected 09 Oct 2023 07:00)  Source: .Blood Blood  Gram Stain (10 Oct 2023 09:30):    Growth in aerobic bottle: Gram Positive Cocci in Clusters    Growth in anaerobic bottle: Gram Positive Cocci in Clusters  Preliminary Report (10 Oct 2023 19:00):    Growth in aerobic bottle: Staphylococcus aureus    Growth in anaerobic bottle: Gram Positive Cocci in Clusters    Culture - Blood (collected 09 Oct 2023 07:00)  Source: .Blood Blood  Gram Stain (10 Oct 2023 06:14):    Growth in aerobic bottle: Gram Positive Cocci in Clusters    Growth in anaerobic bottle: Gram Positive Cocci in Clusters  Preliminary Report (10 Oct 2023 19:56):    Growth in aerobic and anaerobic bottles: Staphylococcus aureus    See previous culture [id number]    acetaminophen     Tablet .. 650 milliGRAM(s) Oral every 6 hours PRN  aluminum hydroxide/magnesium hydroxide/simethicone Suspension 10 milliLiter(s) Oral every 6 hours PRN  apixaban 5 milliGRAM(s) Oral two times a day  aspirin  chewable 81 milliGRAM(s) Oral daily  ceftaroline fosamil IVPB      ceftaroline fosamil IVPB 200 milliGRAM(s) IV Intermittent every 8 hours  chlorhexidine 2% Cloths 1 Application(s) Topical <User Schedule>  DAPTOmycin IVPB 600 milliGRAM(s) IV Intermittent every 48 hours  epoetin val (PROCRIT) Injectable 93134 Unit(s) IV Push <User Schedule>  folic acid 1 milliGRAM(s) Oral daily  hydrocortisone hemorrhoidal Suppository 1 Suppository(s) Rectal two times a day  influenza  Vaccine (HIGH DOSE) 0.7 milliLiter(s) IntraMuscular once  levothyroxine 150 MICROGram(s) Oral daily  mesalamine Suppository 1000 milliGRAM(s) Rectal at bedtime  midodrine 10 milliGRAM(s) Oral every 8 hours  pantoprazole   Suspension 40 milliGRAM(s) Enteral Tube daily  polyethylene glycol 3350 17 Gram(s) Oral daily  senna 2 Tablet(s) Oral at bedtime  sodium chloride 0.9% lock flush 10 milliLiter(s) IV Push every 1 hour PRN    A/P:    ESRD on HD at  SNF MWF via perm cath, Afib, CM, EF 45 - 50%  Has LUE AVF not yet mature   Adm 9/30/23 w/MRSA bacteremia, persistent   Abx per ID  Perm cath d/c-d 10/2/23  Cath cx pos for MRSA  ERENDIRA if c/w goc   HD MWF via temp IJ HD cath (placed 10/4/23)  Fluid removal w/HD as tolerates   Prognosis is guarded overall    [phone] Resting, on RA    Vital Signs Last 24 Hrs  T(C): 36.9 (10-10-23 @ 12:24), Max: 36.9 (10-10-23 @ 12:24)  T(F): 98.4 (10-10-23 @ 12:24), Max: 98.4 (10-10-23 @ 12:24)  HR: 99 (10-10-23 @ 12:24) (99 - 107)  BP: 103/68 (10-10-23 @ 12:24) (103/68 - 132/68)  RR: 18 (10-10-23 @ 12:24) (18 - 18)  SpO2: 94% (10-10-23 @ 12:24) (94% - 94%)    s1s2  b/l air entry  soft, ND  sm edema, LUE AVF + bruit                                                                    7.9    17.93 )-----------( 119      ( 10 Oct 2023 06:36 )             23.3     10 Oct 2023 06:36    136    |  100    |  56     ----------------------------<  115    4.4     |  27     |  4.17     Ca    9.6        10 Oct 2023 06:36  Phos  3.2       09 Oct 2023 07:00  Mg     1.9       09 Oct 2023 07:00    TPro  6.2    /  Alb  2.1    /  TBili  0.8    /  DBili  x      /  AST  17     /  ALT  15     /  AlkPhos  225    10 Oct 2023 06:36    LIVER FUNCTIONS - ( 10 Oct 2023 06:36 )  Alb: 2.1 g/dL / Pro: 6.2 g/dL / ALK PHOS: 225 U/L / ALT: 15 U/L / AST: 17 U/L / GGT: x           Culture - Blood (collected 09 Oct 2023 07:00)  Source: .Blood Blood  Gram Stain (10 Oct 2023 09:30):    Growth in aerobic bottle: Gram Positive Cocci in Clusters    Growth in anaerobic bottle: Gram Positive Cocci in Clusters  Preliminary Report (10 Oct 2023 19:00):    Growth in aerobic bottle: Staphylococcus aureus    Growth in anaerobic bottle: Gram Positive Cocci in Clusters    Culture - Blood (collected 09 Oct 2023 07:00)  Source: .Blood Blood  Gram Stain (10 Oct 2023 06:14):    Growth in aerobic bottle: Gram Positive Cocci in Clusters    Growth in anaerobic bottle: Gram Positive Cocci in Clusters  Preliminary Report (10 Oct 2023 19:56):    Growth in aerobic and anaerobic bottles: Staphylococcus aureus    See previous culture [id number]    acetaminophen     Tablet .. 650 milliGRAM(s) Oral every 6 hours PRN  aluminum hydroxide/magnesium hydroxide/simethicone Suspension 10 milliLiter(s) Oral every 6 hours PRN  apixaban 5 milliGRAM(s) Oral two times a day  aspirin  chewable 81 milliGRAM(s) Oral daily  ceftaroline fosamil IVPB      ceftaroline fosamil IVPB 200 milliGRAM(s) IV Intermittent every 8 hours  chlorhexidine 2% Cloths 1 Application(s) Topical <User Schedule>  DAPTOmycin IVPB 600 milliGRAM(s) IV Intermittent every 48 hours  epoetin val (PROCRIT) Injectable 34432 Unit(s) IV Push <User Schedule>  folic acid 1 milliGRAM(s) Oral daily  hydrocortisone hemorrhoidal Suppository 1 Suppository(s) Rectal two times a day  influenza  Vaccine (HIGH DOSE) 0.7 milliLiter(s) IntraMuscular once  levothyroxine 150 MICROGram(s) Oral daily  mesalamine Suppository 1000 milliGRAM(s) Rectal at bedtime  midodrine 10 milliGRAM(s) Oral every 8 hours  pantoprazole   Suspension 40 milliGRAM(s) Enteral Tube daily  polyethylene glycol 3350 17 Gram(s) Oral daily  senna 2 Tablet(s) Oral at bedtime  sodium chloride 0.9% lock flush 10 milliLiter(s) IV Push every 1 hour PRN    A/P:    ESRD on HD at  SNF MWF via perm cath, Afib, CM, EF 45 - 50%  Has LUE AVF not yet mature   Adm 9/30/23 w/MRSA bacteremia, persistent   Abx per ID  Perm cath d/c-d 10/2/23  Cath cx pos for MRSA  ERENDIRA if c/w goc   HD MWF via temp IJ HD cath (placed 10/4/23)  Fluid removal w/HD as tolerates   Prognosis is guarded overall    [phone] Resting, on RA    Vital Signs Last 24 Hrs  T(C): 36.9 (10-10-23 @ 12:24), Max: 36.9 (10-10-23 @ 12:24)  T(F): 98.4 (10-10-23 @ 12:24), Max: 98.4 (10-10-23 @ 12:24)  HR: 99 (10-10-23 @ 12:24) (99 - 107)  BP: 103/68 (10-10-23 @ 12:24) (103/68 - 132/68)  RR: 18 (10-10-23 @ 12:24) (18 - 18)  SpO2: 94% (10-10-23 @ 12:24) (94% - 94%)    s1s2  b/l air entry  soft, ND  sm edema, LUE AVF + bruit                                                                    7.9    17.93 )-----------( 119      ( 10 Oct 2023 06:36 )             23.3     10 Oct 2023 06:36    136    |  100    |  56     ----------------------------<  115    4.4     |  27     |  4.17     Ca    9.6        10 Oct 2023 06:36  Phos  3.2       09 Oct 2023 07:00  Mg     1.9       09 Oct 2023 07:00    TPro  6.2    /  Alb  2.1    /  TBili  0.8    /  DBili  x      /  AST  17     /  ALT  15     /  AlkPhos  225    10 Oct 2023 06:36    LIVER FUNCTIONS - ( 10 Oct 2023 06:36 )  Alb: 2.1 g/dL / Pro: 6.2 g/dL / ALK PHOS: 225 U/L / ALT: 15 U/L / AST: 17 U/L / GGT: x           Culture - Blood (collected 09 Oct 2023 07:00)  Source: .Blood Blood  Gram Stain (10 Oct 2023 09:30):    Growth in aerobic bottle: Gram Positive Cocci in Clusters    Growth in anaerobic bottle: Gram Positive Cocci in Clusters  Preliminary Report (10 Oct 2023 19:00):    Growth in aerobic bottle: Staphylococcus aureus    Growth in anaerobic bottle: Gram Positive Cocci in Clusters    Culture - Blood (collected 09 Oct 2023 07:00)  Source: .Blood Blood  Gram Stain (10 Oct 2023 06:14):    Growth in aerobic bottle: Gram Positive Cocci in Clusters    Growth in anaerobic bottle: Gram Positive Cocci in Clusters  Preliminary Report (10 Oct 2023 19:56):    Growth in aerobic and anaerobic bottles: Staphylococcus aureus    See previous culture [id number]    acetaminophen     Tablet .. 650 milliGRAM(s) Oral every 6 hours PRN  aluminum hydroxide/magnesium hydroxide/simethicone Suspension 10 milliLiter(s) Oral every 6 hours PRN  apixaban 5 milliGRAM(s) Oral two times a day  aspirin  chewable 81 milliGRAM(s) Oral daily  ceftaroline fosamil IVPB      ceftaroline fosamil IVPB 200 milliGRAM(s) IV Intermittent every 8 hours  chlorhexidine 2% Cloths 1 Application(s) Topical <User Schedule>  DAPTOmycin IVPB 600 milliGRAM(s) IV Intermittent every 48 hours  epoetin val (PROCRIT) Injectable 38499 Unit(s) IV Push <User Schedule>  folic acid 1 milliGRAM(s) Oral daily  hydrocortisone hemorrhoidal Suppository 1 Suppository(s) Rectal two times a day  influenza  Vaccine (HIGH DOSE) 0.7 milliLiter(s) IntraMuscular once  levothyroxine 150 MICROGram(s) Oral daily  mesalamine Suppository 1000 milliGRAM(s) Rectal at bedtime  midodrine 10 milliGRAM(s) Oral every 8 hours  pantoprazole   Suspension 40 milliGRAM(s) Enteral Tube daily  polyethylene glycol 3350 17 Gram(s) Oral daily  senna 2 Tablet(s) Oral at bedtime  sodium chloride 0.9% lock flush 10 milliLiter(s) IV Push every 1 hour PRN    A/P:    ESRD on HD at  SNF MWF via perm cath, Afib, CM, EF 45 - 50%  Has LUE AVF not yet mature   Adm 9/30/23 w/MRSA bacteremia, persistent   Abx per ID  Perm cath d/c-d 10/2/23  Cath cx pos for MRSA  ERENDIRA if c/w goc   HD MWF via temp IJ HD cath (placed 10/4/23)  Fluid removal w/HD as tolerates   Prognosis is guarded overall    [phone]

## 2023-10-10 NOTE — PROGRESS NOTE ADULT - SUBJECTIVE AND OBJECTIVE BOX
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  REVIEW OF SYSTEMS:  All other review of systems negative (Comprehensive ROS)    Antimicrobials Day #  : day 10 total abx, day 5 synergistic tx, day 1 dapto  ceftaroline fosamil IVPB      ceftaroline fosamil IVPB 200 milliGRAM(s) IV Intermittent every 8 hours  DAPTOmycin IVPB 600 milliGRAM(s) IV Intermittent every 48 hours    Other Medications Reviewed    T(F): 98.4 (10-10-23 @ 12:24), Max: 98.4 (10-10-23 @ 12:24)  HR: 99 (10-10-23 @ 12:24)  BP: 103/68 (10-10-23 @ 12:24)  RR: 18 (10-10-23 @ 12:24)  SpO2: 94% (10-10-23 @ 12:24)  Wt(kg): --    PHYSICAL EXAM:  General: much more alert, no acute distress  Eyes:  anicteric, no conjunctival injection, no discharge  Oropharynx: no lesions or injection 	  Neck: supple, without adenopathy  Lungs: decreased bases  to auscultation  Heart: regular rate and rhythm; no murmur, rubs or gallops  Abdomen: soft, nondistended, nontender, without mass or organomegaly  Skin: slight dermatitis on distal left leg  Extremities: no clubbing, cyanosis,. arms with  edema  Neurologic:much more  alert, oriented, moves all extremities but weak all over    LAB RESULTS:                        7.9    17.93 )-----------( 119      ( 10 Oct 2023 06:36 )             23.3     10-10    136  |  100  |  56<H>  ----------------------------<  115<H>  4.4   |  27  |  4.17<H>    Ca    9.6      10 Oct 2023 06:36  Phos  3.2     10-09  Mg     1.9     10-09    TPro  6.2  /  Alb  2.1<L>  /  TBili  0.8  /  DBili  x   /  AST  17  /  ALT  15  /  AlkPhos  225<H>  10-10    LIVER FUNCTIONS - ( 10 Oct 2023 06:36 )  Alb: 2.1 g/dL / Pro: 6.2 g/dL / ALK PHOS: 225 U/L / ALT: 15 U/L / AST: 17 U/L / GGT: x           Urinalysis Basic - ( 10 Oct 2023 06:36 )    Color: x / Appearance: x / SG: x / pH: x  Gluc: 115 mg/dL / Ketone: x  / Bili: x / Urobili: x   Blood: x / Protein: x / Nitrite: x   Leuk Esterase: x / RBC: x / WBC x   Sq Epi: x / Non Sq Epi: x / Bacteria: x      MICROBIOLOGY:  RECENT CULTURES:  10-09 @ 07:00 .Blood Blood     Growth in aerobic bottle: Gram Positive Cocci in Clusters  Growth in anaerobic bottle: Gram Positive Cocci in Clusters    Growth in aerobic bottle: Gram Positive Cocci in Clusters  Growth in anaerobic bottle: Gram Positive Cocci in Clusters    10-06 @ 10:20 .Blood Blood-Peripheral     Growth in aerobic and anaerobic bottles: Methicillin Resistant  Staphylococcus aureus  See previous culture [id number]    Growth in aerobic bottle: Gram Positive Cocci in Clusters  Growth in anaerobic bottle: Gram Positive Cocci in Clusters        RADIOLOGY REVIEWED:  < from: US Duplex Venous Upper Ext Complete, Bilateral (10.10.23 @ 10:09) >    ACC: [id number] EXAM:  US DPLX UPR EXT VEINS COMPL BI   ORDERED BY:  MANNY العلي     PROCEDURE DATE:  10/10/2023          INTERPRETATION:  CLINICAL INFORMATION: Bilateral upper extremity   swelling. Assess for DVT.    COMPARISON: None available.    TECHNIQUE: Duplex sonography of the BILATERAL upper extremity veins with   color and spectral Doppler, with and without compression.    FINDINGS: Bilateral forearm subcutaneous edema identified.    RIGHT: The right internal jugular and right subclavian vein are unable to   be assessed due to the presence of dressings/vascular access devices.    The right axillary and brachial veins are patent and compressible where   applicable.  The basilic vein (superficial vein) is patent and without   thrombus.  The cephalic vein (superficial vein) is patent and without   thrombus.    LEFT:  The left internal jugular, subclavian, axillary and brachial veins are   patent and compressible where applicable.  The basilic vein (superficial   vein) is patent andwithout thrombus.  The cephalic vein (superficial   vein) is patent and without thrombus.    Doppler examination shows normal spontaneous and phasic flow.    IMPRESSION:  No evidence of deep venous thrombosis in either upper extremity. Please   note:The right internal jugular and right subclavian vein are unable to   be assessed due to the presence of dressings/vascular access devices.      ACC: [id number] EXAM:  ECHO TTE WO CON COMP W DOPP                          PROCEDURE DATE:  10/01/2023          INTERPRETATION:  TRANSTHORACIC ECHOCARDIOGRAM REPORT        Patient Name:   JANEE ARCEO Patient Location: [address] Rec #:  [id number] Accession #:      [id number]  Account #:      [id number]   Height:           67.7 in 172.0 cm  YOB: 1945  Weight:           158.7 lb 72.00 kg  Patient Age:    78 years   BSA:              1.85 m²  Patient Gender: M          BP:    97/42 mmHg      Date of Exam:        10/1/2023 10:26:05 AM  Sonographer:         RON  Referring Physician: NANCY    Procedure:     2D Echo/Doppler/Color Doppler Complete.  Indications:   I33.0 - Acute and subacute infective endocarditis  Diagnosis:     I33.0 - Acute and subacute infective endocarditis  Study Details: Technically adequate study. Study quality was adversely   affected                 due to Apical view is limited.        2D AND M-MODE MEASUREMENTS (normal ranges within parentheses):  Left Ventricle:                  Normal   Aorta/Left Atrium:               Normal  IVSd (2D):              1.01 cm (0.7-1.1) Aortic Root (Mmode): 3.93 cm   (2.4-3.7)  LVPWd (2D):             1.19 cm (0.7-1.1) AoV Cusp Separation: 1.70 cm   (1.5-2.6)  LVIDd (2D):             4.68 cm (3.4-5.7) Left Atrium (Mmode): 4.04 cm   (1.9-4.0)  LVIDs (2D):             3.76 cm  LV FS (2D):             19.5 %   (>25%)  LV EF (2D):              40 %    (>55%)  Relative Wall Thickness  0.51    (<0.42)    LV DIASTOLIC FUNCTION:  MV Peak E: 1.15 m/s Decel Time: 424 msec  MV Peak A: 0.12 m/s  E/A Ratio: 9.44    SPECTRAL DOPPLER ANALYSIS (where applicable):  Mitral Valve:  MV P1/2 Time: 123.00 msec  MV Area, PHT: 1.79 cm²    Aortic Valve: AoV Max Inder: 1.45 m/s AoV Peak P.4 mmHg AoV Mean P.5 mmHg    LVOT Vmax: 0.42 m/s LVOT VTI: 0.084 m LVOT Diameter: 2.10 cm    AoV Area, Vmax: 1.00 cm² AoV Area, VTI: 1.37 cm² AoV Area, Vmn: 1.30 cm²  Ao VTI: 0.212  Tricuspid Valve and PA/RV Systolic Pressure: TR Max Velocity: 2.33 m/s RA   Pressure: 10 mmHg RVSP/PASP: 31.6 mmHg      PHYSICIAN INTERPRETATION:  Left Ventricle: The left ventricular internal cavity size is normal.  Global LV systolic function was normal. Left ventricular ejection   fraction, by visual estimation, is 45 to 50%. Abnormal (paradoxical)   septal motion consistent with post-operative status. The left ventricular   diastolic function could not be assessed in this study.  Right Ventricle: The right ventricle was not well visualized.  Left Atrium: Severely enlarged left atrium.  Right Atrium: Moderately enlarged right atrium.  Pericardium: Trivial pericardial effusion is present. The pericardial   effusion is globally located around the entire heart.  Mitral Valve: There ismild mitral annular calcification. MV Prosthetic   Valve.  Tricuspid Valve: The tricuspid valve is normal in structure.   Mild-moderate tricuspid regurgitation is visualized.  Aortic Valve: The aortic valve is trileaflet. Mild aortic stenosis is   present. Trivial aortic valve regurgitation is seen.  Pulmonic Valve: Structurally normal pulmonic valve, with normal leaflet   excursion. The pulmonic valve is normal. Mild pulmonic valve   regurgitation.  Aorta: There is dilatation of the aortic root.      Summary:   1. 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 Biatrial enlargement   4. Left ventricular ejection fraction, by visual estimation, is 45 to   50%.   5. Mild mitral annular calcification.   6. Mild-moderate tricuspid regurgitation.   7. Mild aortic valve stenosis.    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 He had the permacath removed. He had a tte no veg, doppler no dvt in [place]. He now has a shiley. He is a poor surgical candidate for ohs redo so talya on hold. He is so far still bacteremic so will change vanco to dapto. Clinically he is better today with improved ms, wbc down a bit. We may need to revisit the talya if heroic measures are a consideration  and will do ct cap to be sure no occult drainable source.   Plan:  continue antibiotics with daptomycin and ceftaroline.   I am concerned that lack of source control despite removal of old permacath is driving persistent bacteremia. 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  REVIEW OF SYSTEMS:  All other review of systems negative (Comprehensive ROS)    Antimicrobials Day #  : day 10 total abx, day 5 synergistic tx, day 1 dapto  ceftaroline fosamil IVPB      ceftaroline fosamil IVPB 200 milliGRAM(s) IV Intermittent every 8 hours  DAPTOmycin IVPB 600 milliGRAM(s) IV Intermittent every 48 hours    Other Medications Reviewed    T(F): 98.4 (10-10-23 @ 12:24), Max: 98.4 (10-10-23 @ 12:24)  HR: 99 (10-10-23 @ 12:24)  BP: 103/68 (10-10-23 @ 12:24)  RR: 18 (10-10-23 @ 12:24)  SpO2: 94% (10-10-23 @ 12:24)  Wt(kg): --    PHYSICAL EXAM:  General: much more alert, no acute distress  Eyes:  anicteric, no conjunctival injection, no discharge  Oropharynx: no lesions or injection 	  Neck: supple, without adenopathy  Lungs: decreased bases  to auscultation  Heart: regular rate and rhythm; no murmur, rubs or gallops  Abdomen: soft, nondistended, nontender, without mass or organomegaly  Skin: slight dermatitis on distal left leg  Extremities: no clubbing, cyanosis,. arms with  edema  Neurologic:much more  alert, oriented, moves all extremities but weak all over    LAB RESULTS:                        7.9    17.93 )-----------( 119      ( 10 Oct 2023 06:36 )             23.3     10-10    136  |  100  |  56<H>  ----------------------------<  115<H>  4.4   |  27  |  4.17<H>    Ca    9.6      10 Oct 2023 06:36  Phos  3.2     10-09  Mg     1.9     10-09    TPro  6.2  /  Alb  2.1<L>  /  TBili  0.8  /  DBili  x   /  AST  17  /  ALT  15  /  AlkPhos  225<H>  10-10    LIVER FUNCTIONS - ( 10 Oct 2023 06:36 )  Alb: 2.1 g/dL / Pro: 6.2 g/dL / ALK PHOS: 225 U/L / ALT: 15 U/L / AST: 17 U/L / GGT: x           Urinalysis Basic - ( 10 Oct 2023 06:36 )    Color: x / Appearance: x / SG: x / pH: x  Gluc: 115 mg/dL / Ketone: x  / Bili: x / Urobili: x   Blood: x / Protein: x / Nitrite: x   Leuk Esterase: x / RBC: x / WBC x   Sq Epi: x / Non Sq Epi: x / Bacteria: x      MICROBIOLOGY:  RECENT CULTURES:  10-09 @ 07:00 .Blood Blood     Growth in aerobic bottle: Gram Positive Cocci in Clusters  Growth in anaerobic bottle: Gram Positive Cocci in Clusters    Growth in aerobic bottle: Gram Positive Cocci in Clusters  Growth in anaerobic bottle: Gram Positive Cocci in Clusters    10-06 @ 10:20 .Blood Blood-Peripheral     Growth in aerobic and anaerobic bottles: Methicillin Resistant  Staphylococcus aureus  See previous culture [id number]    Growth in aerobic bottle: Gram Positive Cocci in Clusters  Growth in anaerobic bottle: Gram Positive Cocci in Clusters        RADIOLOGY REVIEWED:  < from: US Duplex Venous Upper Ext Complete, Bilateral (10.10.23 @ 10:09) >    ACC: [id number] EXAM:  US DPLX UPR EXT VEINS COMPL BI   ORDERED BY:  MANNY العلي     PROCEDURE DATE:  10/10/2023          INTERPRETATION:  CLINICAL INFORMATION: Bilateral upper extremity   swelling. Assess for DVT.    COMPARISON: None available.    TECHNIQUE: Duplex sonography of the BILATERAL upper extremity veins with   color and spectral Doppler, with and without compression.    FINDINGS: Bilateral forearm subcutaneous edema identified.    RIGHT: The right internal jugular and right subclavian vein are unable to   be assessed due to the presence of dressings/vascular access devices.    The right axillary and brachial veins are patent and compressible where   applicable.  The basilic vein (superficial vein) is patent and without   thrombus.  The cephalic vein (superficial vein) is patent and without   thrombus.    LEFT:  The left internal jugular, subclavian, axillary and brachial veins are   patent and compressible where applicable.  The basilic vein (superficial   vein) is patent andwithout thrombus.  The cephalic vein (superficial   vein) is patent and without thrombus.    Doppler examination shows normal spontaneous and phasic flow.    IMPRESSION:  No evidence of deep venous thrombosis in either upper extremity. Please   note:The right internal jugular and right subclavian vein are unable to   be assessed due to the presence of dressings/vascular access devices.      ACC: [id number] EXAM:  ECHO TTE WO CON COMP W DOPP                          PROCEDURE DATE:  10/01/2023          INTERPRETATION:  TRANSTHORACIC ECHOCARDIOGRAM REPORT        Patient Name:   JANEE ARCEO Patient Location: [address] Rec #:  [id number] Accession #:      [id number]  Account #:      [id number]   Height:           67.7 in 172.0 cm  YOB: 1945  Weight:           158.7 lb 72.00 kg  Patient Age:    78 years   BSA:              1.85 m²  Patient Gender: M          BP:    97/42 mmHg      Date of Exam:        10/1/2023 10:26:05 AM  Sonographer:         RON  Referring Physician: NANCY    Procedure:     2D Echo/Doppler/Color Doppler Complete.  Indications:   I33.0 - Acute and subacute infective endocarditis  Diagnosis:     I33.0 - Acute and subacute infective endocarditis  Study Details: Technically adequate study. Study quality was adversely   affected                 due to Apical view is limited.        2D AND M-MODE MEASUREMENTS (normal ranges within parentheses):  Left Ventricle:                  Normal   Aorta/Left Atrium:               Normal  IVSd (2D):              1.01 cm (0.7-1.1) Aortic Root (Mmode): 3.93 cm   (2.4-3.7)  LVPWd (2D):             1.19 cm (0.7-1.1) AoV Cusp Separation: 1.70 cm   (1.5-2.6)  LVIDd (2D):             4.68 cm (3.4-5.7) Left Atrium (Mmode): 4.04 cm   (1.9-4.0)  LVIDs (2D):             3.76 cm  LV FS (2D):             19.5 %   (>25%)  LV EF (2D):              40 %    (>55%)  Relative Wall Thickness  0.51    (<0.42)    LV DIASTOLIC FUNCTION:  MV Peak E: 1.15 m/s Decel Time: 424 msec  MV Peak A: 0.12 m/s  E/A Ratio: 9.44    SPECTRAL DOPPLER ANALYSIS (where applicable):  Mitral Valve:  MV P1/2 Time: 123.00 msec  MV Area, PHT: 1.79 cm²    Aortic Valve: AoV Max Inder: 1.45 m/s AoV Peak P.4 mmHg AoV Mean P.5 mmHg    LVOT Vmax: 0.42 m/s LVOT VTI: 0.084 m LVOT Diameter: 2.10 cm    AoV Area, Vmax: 1.00 cm² AoV Area, VTI: 1.37 cm² AoV Area, Vmn: 1.30 cm²  Ao VTI: 0.212  Tricuspid Valve and PA/RV Systolic Pressure: TR Max Velocity: 2.33 m/s RA   Pressure: 10 mmHg RVSP/PASP: 31.6 mmHg      PHYSICIAN INTERPRETATION:  Left Ventricle: The left ventricular internal cavity size is normal.  Global LV systolic function was normal. Left ventricular ejection   fraction, by visual estimation, is 45 to 50%. Abnormal (paradoxical)   septal motion consistent with post-operative status. The left ventricular   diastolic function could not be assessed in this study.  Right Ventricle: The right ventricle was not well visualized.  Left Atrium: Severely enlarged left atrium.  Right Atrium: Moderately enlarged right atrium.  Pericardium: Trivial pericardial effusion is present. The pericardial   effusion is globally located around the entire heart.  Mitral Valve: There ismild mitral annular calcification. MV Prosthetic   Valve.  Tricuspid Valve: The tricuspid valve is normal in structure.   Mild-moderate tricuspid regurgitation is visualized.  Aortic Valve: The aortic valve is trileaflet. Mild aortic stenosis is   present. Trivial aortic valve regurgitation is seen.  Pulmonic Valve: Structurally normal pulmonic valve, with normal leaflet   excursion. The pulmonic valve is normal. Mild pulmonic valve   regurgitation.  Aorta: There is dilatation of the aortic root.      Summary:   1. 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 Biatrial enlargement   4. Left ventricular ejection fraction, by visual estimation, is 45 to   50%.   5. Mild mitral annular calcification.   6. Mild-moderate tricuspid regurgitation.   7. Mild aortic valve stenosis.    [id number] Pilo Lozano , Electronically signed on 10/1/2023   at 11:45:16 AM            *** Final ***    --- End of Report ---    < end of copied text >              Assessment:  Patient with esrd on dialysis via a permacath, mvr, s/p ppm removal for mssa bacteremia/micra pacer placed, came in from nursing home with fever, hypotension, on , found ot have high grade mrsa bacteremia. He had the permacath removed. He had a tte no veg, doppler no dvt in [place]. He now has a shiley. He is a poor surgical candidate for ohs redo so talya on hold. He is so far still bacteremic so will change vanco to dapto. Clinically he is better today with improved ms, wbc down a bit. We may need to revisit the talya if heroic measures are a consideration  and will do ct cap to be sure no occult drainable source.   Plan:  continue antibiotics with daptomycin and ceftaroline.   I am concerned that lack of source control despite removal of old permacath is driving persistent bacteremia. 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  REVIEW OF SYSTEMS:  All other review of systems negative (Comprehensive ROS)    Antimicrobials Day #  : day 10 total abx, day 5 synergistic tx, day 1 dapto  ceftaroline fosamil IVPB      ceftaroline fosamil IVPB 200 milliGRAM(s) IV Intermittent every 8 hours  DAPTOmycin IVPB 600 milliGRAM(s) IV Intermittent every 48 hours    Other Medications Reviewed    T(F): 98.4 (10-10-23 @ 12:24), Max: 98.4 (10-10-23 @ 12:24)  HR: 99 (10-10-23 @ 12:24)  BP: 103/68 (10-10-23 @ 12:24)  RR: 18 (10-10-23 @ 12:24)  SpO2: 94% (10-10-23 @ 12:24)  Wt(kg): --    PHYSICAL EXAM:  General: much more alert, no acute distress  Eyes:  anicteric, no conjunctival injection, no discharge  Oropharynx: no lesions or injection 	  Neck: supple, without adenopathy  Lungs: decreased bases  to auscultation  Heart: regular rate and rhythm; no murmur, rubs or gallops  Abdomen: soft, nondistended, nontender, without mass or organomegaly  Skin: slight dermatitis on distal left leg  Extremities: no clubbing, cyanosis,. arms with  edema  Neurologic:much more  alert, oriented, moves all extremities but weak all over    LAB RESULTS:                        7.9    17.93 )-----------( 119      ( 10 Oct 2023 06:36 )             23.3     10-10    136  |  100  |  56<H>  ----------------------------<  115<H>  4.4   |  27  |  4.17<H>    Ca    9.6      10 Oct 2023 06:36  Phos  3.2     10-09  Mg     1.9     10-09    TPro  6.2  /  Alb  2.1<L>  /  TBili  0.8  /  DBili  x   /  AST  17  /  ALT  15  /  AlkPhos  225<H>  10-10    LIVER FUNCTIONS - ( 10 Oct 2023 06:36 )  Alb: 2.1 g/dL / Pro: 6.2 g/dL / ALK PHOS: 225 U/L / ALT: 15 U/L / AST: 17 U/L / GGT: x           Urinalysis Basic - ( 10 Oct 2023 06:36 )    Color: x / Appearance: x / SG: x / pH: x  Gluc: 115 mg/dL / Ketone: x  / Bili: x / Urobili: x   Blood: x / Protein: x / Nitrite: x   Leuk Esterase: x / RBC: x / WBC x   Sq Epi: x / Non Sq Epi: x / Bacteria: x      MICROBIOLOGY:  RECENT CULTURES:  10-09 @ 07:00 .Blood Blood     Growth in aerobic bottle: Gram Positive Cocci in Clusters  Growth in anaerobic bottle: Gram Positive Cocci in Clusters    Growth in aerobic bottle: Gram Positive Cocci in Clusters  Growth in anaerobic bottle: Gram Positive Cocci in Clusters    10-06 @ 10:20 .Blood Blood-Peripheral     Growth in aerobic and anaerobic bottles: Methicillin Resistant  Staphylococcus aureus  See previous culture [id number]    Growth in aerobic bottle: Gram Positive Cocci in Clusters  Growth in anaerobic bottle: Gram Positive Cocci in Clusters        RADIOLOGY REVIEWED:  < from: US Duplex Venous Upper Ext Complete, Bilateral (10.10.23 @ 10:09) >    ACC: [id number] EXAM:  US DPLX UPR EXT VEINS COMPL BI   ORDERED BY:  MANNY العلي     PROCEDURE DATE:  10/10/2023          INTERPRETATION:  CLINICAL INFORMATION: Bilateral upper extremity   swelling. Assess for DVT.    COMPARISON: None available.    TECHNIQUE: Duplex sonography of the BILATERAL upper extremity veins with   color and spectral Doppler, with and without compression.    FINDINGS: Bilateral forearm subcutaneous edema identified.    RIGHT: The right internal jugular and right subclavian vein are unable to   be assessed due to the presence of dressings/vascular access devices.    The right axillary and brachial veins are patent and compressible where   applicable.  The basilic vein (superficial vein) is patent and without   thrombus.  The cephalic vein (superficial vein) is patent and without   thrombus.    LEFT:  The left internal jugular, subclavian, axillary and brachial veins are   patent and compressible where applicable.  The basilic vein (superficial   vein) is patent andwithout thrombus.  The cephalic vein (superficial   vein) is patent and without thrombus.    Doppler examination shows normal spontaneous and phasic flow.    IMPRESSION:  No evidence of deep venous thrombosis in either upper extremity. Please   note:The right internal jugular and right subclavian vein are unable to   be assessed due to the presence of dressings/vascular access devices.      ACC: [id number] EXAM:  ECHO TTE WO CON COMP W DOPP                          PROCEDURE DATE:  10/01/2023          INTERPRETATION:  TRANSTHORACIC ECHOCARDIOGRAM REPORT        Patient Name:   JANEE ARCEO Patient Location: [address] Rec #:  [id number] Accession #:      [id number]  Account #:      [id number]   Height:           67.7 in 172.0 cm  YOB: 1945  Weight:           158.7 lb 72.00 kg  Patient Age:    78 years   BSA:              1.85 m²  Patient Gender: M          BP:    97/42 mmHg      Date of Exam:        10/1/2023 10:26:05 AM  Sonographer:         RON  Referring Physician: NANCY    Procedure:     2D Echo/Doppler/Color Doppler Complete.  Indications:   I33.0 - Acute and subacute infective endocarditis  Diagnosis:     I33.0 - Acute and subacute infective endocarditis  Study Details: Technically adequate study. Study quality was adversely   affected                 due to Apical view is limited.        2D AND M-MODE MEASUREMENTS (normal ranges within parentheses):  Left Ventricle:                  Normal   Aorta/Left Atrium:               Normal  IVSd (2D):              1.01 cm (0.7-1.1) Aortic Root (Mmode): 3.93 cm   (2.4-3.7)  LVPWd (2D):             1.19 cm (0.7-1.1) AoV Cusp Separation: 1.70 cm   (1.5-2.6)  LVIDd (2D):             4.68 cm (3.4-5.7) Left Atrium (Mmode): 4.04 cm   (1.9-4.0)  LVIDs (2D):             3.76 cm  LV FS (2D):             19.5 %   (>25%)  LV EF (2D):              40 %    (>55%)  Relative Wall Thickness  0.51    (<0.42)    LV DIASTOLIC FUNCTION:  MV Peak E: 1.15 m/s Decel Time: 424 msec  MV Peak A: 0.12 m/s  E/A Ratio: 9.44    SPECTRAL DOPPLER ANALYSIS (where applicable):  Mitral Valve:  MV P1/2 Time: 123.00 msec  MV Area, PHT: 1.79 cm²    Aortic Valve: AoV Max Inder: 1.45 m/s AoV Peak P.4 mmHg AoV Mean P.5 mmHg    LVOT Vmax: 0.42 m/s LVOT VTI: 0.084 m LVOT Diameter: 2.10 cm    AoV Area, Vmax: 1.00 cm² AoV Area, VTI: 1.37 cm² AoV Area, Vmn: 1.30 cm²  Ao VTI: 0.212  Tricuspid Valve and PA/RV Systolic Pressure: TR Max Velocity: 2.33 m/s RA   Pressure: 10 mmHg RVSP/PASP: 31.6 mmHg      PHYSICIAN INTERPRETATION:  Left Ventricle: The left ventricular internal cavity size is normal.  Global LV systolic function was normal. Left ventricular ejection   fraction, by visual estimation, is 45 to 50%. Abnormal (paradoxical)   septal motion consistent with post-operative status. The left ventricular   diastolic function could not be assessed in this study.  Right Ventricle: The right ventricle was not well visualized.  Left Atrium: Severely enlarged left atrium.  Right Atrium: Moderately enlarged right atrium.  Pericardium: Trivial pericardial effusion is present. The pericardial   effusion is globally located around the entire heart.  Mitral Valve: There ismild mitral annular calcification. MV Prosthetic   Valve.  Tricuspid Valve: The tricuspid valve is normal in structure.   Mild-moderate tricuspid regurgitation is visualized.  Aortic Valve: The aortic valve is trileaflet. Mild aortic stenosis is   present. Trivial aortic valve regurgitation is seen.  Pulmonic Valve: Structurally normal pulmonic valve, with normal leaflet   excursion. The pulmonic valve is normal. Mild pulmonic valve   regurgitation.  Aorta: There is dilatation of the aortic root.      Summary:   1. 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 Biatrial enlargement   4. Left ventricular ejection fraction, by visual estimation, is 45 to   50%.   5. Mild mitral annular calcification.   6. Mild-moderate tricuspid regurgitation.   7. Mild aortic valve stenosis.    [id number] Pilo Lozano , Electronically signed on 10/1/2023   at 11:45:16 AM            *** Final ***    --- End of Report ---    < end of copied text >              Assessment:  Patient with esrd on dialysis via a permacath, mvr, s/p ppm removal for mssa bacteremia/micra pacer placed, came in from nursing home with fever, hypotension, on , found ot have high grade mrsa bacteremia. He had the permacath removed. He had a tte no veg, doppler no dvt in arms. He now has a shiley. He is a poor surgical candidate for ohs redo so talya on hold. He is so far still bacteremic so will change vanco to dapto. Clinically he is better today with improved ms, wbc down a bit. We may need to revisit the talya if heroic measures are a consideration  and will do ct cap to be sure no occult drainable source.   Plan:  continue antibiotics with daptomycin and ceftaroline.   I am concerned that lack of source control despite removal of old permacath is driving persistent bacteremia. Will need to do goc discussions further if we continue to fail to clear the blood stream  do repeat blood cx in the am  check baseline cpk then weekly with dapto  ct cap

## 2023-10-10 NOTE — PROGRESS NOTE ADULT - ASSESSMENT
Assessment:  Liam Garza is a 78 year old man with past medical history of Coronary artery disease (s/p CABG), Bioprosthetic MVR, ANAHI closure with AtriClip in 2021, Cardiomyopathy, Atrial fibrillation, End stage renal disease with prior hospitalization in March at [facility] with pacemaker extraction secondary to MSSA bacteremia with replacement with Micra PPM, then recent hospitalization at [facility] in August for rectal bleeding now presented with fever and cough, found to have septic shock secondary to MRSA bacteremia s/p pressor support and ICU course, now stable on medical floor.     Cardiology re-consulted to evaluate if patient is candidate for ERENDIRA to evaluate for endocarditis. ECG consistent with atrial fibrillation and old inferior infarct, no acute ischemic ST abnormalities. Echo report consistent with LVEF 45-50%, mild-moderate tricuspid regurgitation and mild aortic valve stenosis. The patient was evaluated at bedside, remains encephalopathic.     Recommendations:  [] Septic shock with MRSA bacteremia: S/p removal of tunneled dialysis catheter. Infectious workup per ID. ERENDIRA was initially contraindicated due to altered mental status/encephalopathic, hemodynamic instability with shock on pressor support and severe thrombocytopenia with high risk for traumatic bleeding with ERENDIRA probe insertion. This was discussed in detail with Infectious Disease/Dr. Kat previously.     Primary team and ID/Dr Green requesting ERENDIRA re-evaluation. The patient has improved platelets but remains encephalopathic. He will need GI clearance due to prior GI bleed in August and ongoing anemia. Will follow up with ID if ERENDIRA will , the patient is not a surgical candidate. Continue IV antibiotics per ID. Consider repeating transthoracic echocardiogram in meantime. Will discuss with patient's family if this is within goals of care, as patient is encephalopathic and cannot make decisions at this time.    Discussed with Dr. Almaguer. We will continue to follow along.    Thu Bass MD  Cardiology  Assessment:  Liam Garza is a 78 year old man with past medical history of Coronary artery disease (s/p CABG), Bioprosthetic MVR, ANAHI closure with AtriClip in 2021, Cardiomyopathy, Atrial fibrillation, End stage renal disease with prior hospitalization in March at [facility] with pacemaker extraction secondary to MSSA bacteremia with replacement with Micra PPM, then recent hospitalization at [facility] in August for rectal bleeding now presented with fever and cough, found to have septic shock secondary to MRSA bacteremia s/p pressor support and ICU course, now stable on medical floor.     Cardiology re-consulted to evaluate if patient is candidate for ERENDIRA to evaluate for endocarditis. ECG consistent with atrial fibrillation and old inferior infarct, no acute ischemic ST abnormalities. Echo report consistent with LVEF 45-50%, mild-moderate tricuspid regurgitation and mild aortic valve stenosis. The patient was evaluated at bedside, remains encephalopathic.     Recommendations:  [] Septic shock with MRSA bacteremia: S/p removal of tunneled dialysis catheter. Infectious workup per ID. ERENDIRA was initially contraindicated due to altered mental status/encephalopathic, hemodynamic instability with shock on pressor support and severe thrombocytopenia with high risk for traumatic bleeding with ERENDIRA probe insertion. This was discussed in detail with Infectious Disease/Dr. Kat previously.     Primary team and ID/Dr Green requesting ERENDIRA re-evaluation. The patient has improved platelets but remains encephalopathic. He will need GI clearance due to prior GI bleed in August and ongoing anemia. Will follow up with ID if ERENDIRA will , the patient is not a surgical candidate. Continue IV antibiotics per ID. Consider repeating transthoracic echocardiogram in meantime. Will discuss with patient's family if this is within goals of care, as patient is encephalopathic and cannot make decisions at this time.    Discussed with Dr. Almaguer. We will continue to follow along.    Thu Bass MD  Cardiology  Assessment:  Liam Garza is a 78 year old man with past medical history of Coronary artery disease (s/p CABG), Bioprosthetic MVR, ANAHI closure with AtriClip in 2021, Cardiomyopathy, Atrial fibrillation, End stage renal disease with prior hospitalization in March at [facility] with pacemaker extraction secondary to MSSA bacteremia with replacement with Micra PPM, then recent hospitalization at [facility] in August for rectal bleeding now presented with fever and cough, found to have septic shock secondary to MRSA bacteremia s/p pressor support and ICU course, now stable on medical floor.     Cardiology re-consulted to evaluate if patient is candidate for ERENDIRA to evaluate for endocarditis. ECG consistent with atrial fibrillation and old inferior infarct, no acute ischemic ST abnormalities. Echo report consistent with LVEF 45-50%, mild-moderate tricuspid regurgitation and mild aortic valve stenosis. The patient was evaluated at bedside, remains encephalopathic.     Recommendations:  [] Septic shock with MRSA bacteremia: S/p removal of tunneled dialysis catheter. Infectious workup per ID. ERENDIRA was initially contraindicated due to altered mental status/encephalopathic, hemodynamic instability with shock on pressor support and severe thrombocytopenia with high risk for traumatic bleeding with ERENDIRA probe insertion. This was discussed in detail with Infectious Disease/Dr. Kat previously.     Primary team and ID/Dr Green requesting ERENDIRA re-evaluation. The patient has improved platelets but remains encephalopathic. He will need GI clearance due to prior GI bleed in August and ongoing anemia. Will follow up with ID if ERENDIRA will , the patient is not a surgical candidate. Continue IV antibiotics per ID. Consider repeating transthoracic echocardiogram in meantime. Will discuss with patient's family if this is within goals of care, as patient is encephalopathic and cannot make decisions at this time.    Discussed with Dr. Almaguer. We will continue to follow along.    Thu Bass MD  Cardiology  Assessment:  Liam Garza is a 78 year old man with past medical history of Coronary artery disease (s/p CABG), Bioprosthetic MVR, ANAHI closure with AtriClip in 2021, Cardiomyopathy, Atrial fibrillation, End stage renal disease with prior hospitalization in March at [facility] with pacemaker extraction secondary to MSSA bacteremia with replacement with Micra PPM, then recent hospitalization at [facility] in August for rectal bleeding now presented with fever and cough, found to have septic shock secondary to MRSA bacteremia s/p pressor support and ICU course, now stable on medical floor.     Cardiology re-consulted to evaluate if patient is candidate for ERENDIRA to evaluate for endocarditis. ECG consistent with atrial fibrillation and old inferior infarct, no acute ischemic ST abnormalities. Echo report consistent with LVEF 45-50%, mild-moderate tricuspid regurgitation and mild aortic valve stenosis. The patient was evaluated at bedside, remains encephalopathic.     Recommendations:  [] Septic shock with MRSA bacteremia: S/p removal of tunneled dialysis catheter. Infectious workup per ID. ERENDIRA was initially contraindicated due to altered mental status/encephalopathic, hemodynamic instability with shock on pressor support and severe thrombocytopenia with high risk for traumatic bleeding with ERENDIRA probe insertion. This was discussed in detail with Infectious Disease/Dr. Kat previously.     Primary team and ID/Dr Green requesting ERENDIRA re-evaluation. The patient has improved platelets but remains encephalopathic. He will need GI clearance due to prior GI bleed in August and ongoing anemia. Will follow up with ID if ERENDIRA will , the patient is not a surgical candidate. Continue IV antibiotics per ID. Consider repeating transthoracic echocardiogram in meantime. Will discuss with patient's family if this is within goals of care, as patient is encephalopathic and cannot make decisions at this time.    Discussed with Dr. Almaguer. We will continue to follow along.    Addendum:  Discussed with patient's HCP/Surekha ([phone]) and she requests this to be discussed with patient's private cardiologist, Dr. Roni Lainez ([phone]) prior to any decisions about ERENDIRA being made. Will call his cardiologist.     Thu Bass MD  Cardiology  Assessment:  Liam Garza is a 78 year old man with past medical history of Coronary artery disease (s/p CABG), Bioprosthetic MVR, ANAHI closure with AtriClip in 2021, Cardiomyopathy, Atrial fibrillation, End stage renal disease with prior hospitalization in March at [facility] with pacemaker extraction secondary to MSSA bacteremia with replacement with Micra PPM, then recent hospitalization at [facility] in August for rectal bleeding now presented with fever and cough, found to have septic shock secondary to MRSA bacteremia s/p pressor support and ICU course, now stable on medical floor.     Cardiology re-consulted to evaluate if patient is candidate for ERENDIRA to evaluate for endocarditis. ECG consistent with atrial fibrillation and old inferior infarct, no acute ischemic ST abnormalities. Echo report consistent with LVEF 45-50%, mild-moderate tricuspid regurgitation and mild aortic valve stenosis. The patient was evaluated at bedside, remains encephalopathic.     Recommendations:  [] Septic shock with MRSA bacteremia: S/p removal of tunneled dialysis catheter. Infectious workup per ID. ERENDIRA was initially contraindicated due to altered mental status/encephalopathic, hemodynamic instability with shock on pressor support and severe thrombocytopenia with high risk for traumatic bleeding with ERENDIRA probe insertion. This was discussed in detail with Infectious Disease/Dr. Kat previously.     Primary team and ID/Dr Green requesting ERENDIRA re-evaluation. The patient has improved platelets but remains encephalopathic. He will need GI clearance due to prior GI bleed in August and ongoing anemia. Will follow up with ID if ERENDIRA will , the patient is not a surgical candidate. Continue IV antibiotics per ID. Consider repeating transthoracic echocardiogram in meantime. Will discuss with patient's family if this is within goals of care, as patient is encephalopathic and cannot make decisions at this time.    Discussed with Dr. Almaguer. We will continue to follow along.    Addendum:  Discussed with patient's HCP/Surekha ([phone]) and she requests this to be discussed with patient's private cardiologist, Dr. Roni Lainez ([phone]) prior to any decisions about ERENDIRA being made. Will call his cardiologist.     Thu Bass MD  Cardiology  Assessment:  Liam Garza is a 78 year old man with past medical history of Coronary artery disease (s/p CABG), Bioprosthetic MVR, ANAHI closure with AtriClip in 2021, Cardiomyopathy, Atrial fibrillation, End stage renal disease with prior hospitalization in March at [facility] with pacemaker extraction secondary to MSSA bacteremia with replacement with Micra PPM, then recent hospitalization at [facility] in August for rectal bleeding now presented with fever and cough, found to have septic shock secondary to MRSA bacteremia s/p pressor support and ICU course, now stable on medical floor.     Cardiology re-consulted to evaluate if patient is candidate for ERENDIRA to evaluate for endocarditis. ECG consistent with atrial fibrillation and old inferior infarct, no acute ischemic ST abnormalities. Echo report consistent with LVEF 45-50%, mild-moderate tricuspid regurgitation and mild aortic valve stenosis. The patient was evaluated at bedside, remains encephalopathic.     Recommendations:  [] Septic shock with MRSA bacteremia: S/p removal of tunneled dialysis catheter. Infectious workup per ID. ERENDIRA was initially contraindicated due to altered mental status/encephalopathic, hemodynamic instability with shock on pressor support and severe thrombocytopenia with high risk for traumatic bleeding with ERENDIRA probe insertion. This was discussed in detail with Infectious Disease/Dr. Kat previously.     Primary team and ID/Dr Green requesting ERENDIRA re-evaluation. The patient has improved platelets but remains encephalopathic. He will need GI clearance due to prior GI bleed in August and ongoing anemia. Will follow up with ID if ERENDIRA will , the patient is not a surgical candidate. Continue IV antibiotics per ID. Consider repeating transthoracic echocardiogram in meantime. Will discuss with patient's family if this is within goals of care, as patient is encephalopathic and cannot make decisions at this time.    Discussed with Dr. Almaguer. We will continue to follow along.    Addendum:  Discussed with patient's HCP/Surekha ([phone]) and she requests this to be discussed with patient's private cardiologist, Dr. Roni Lainez ([phone]) prior to any decisions about ERENDIRA being made. Will call his cardiologist.     Thu Bass MD  Cardiology

## 2023-10-10 NOTE — PROGRESS NOTE ADULT - ASSESSMENT
Patient is a 78M h/o ESRD, Afib on Eliquis, CAD s/p PCI, prosthetic s/p MVR, h/o proctitis, hemorrhoids, hypothyroidism was in ICU for metabolic encephalopathy, septic shock 2/2 MRSA pneumonia s/p tunnel cath removal 10/3, IJ Shiley placed 10/4, downgraded to 3E for further management.    #Septic shock 2/2 MRSA bacteremia  #Metabolic encephalopathy  - sepsis picture improved  - awake, AOx0-1  - plan to wean off midodrine as tolerated  - PT consult  - maintain O2 sat >94%  - speech and swallow eval: puree with thin liquids    #MRSA bacteremia  - permacath removed 10/3, shiley placed 10/4  - TTE 10/1/23 EF 45-50%, no vegetations  - c/w ceftaroline q8 per ID  - c/w vancomycin post-dialysis  - BCx 10/6 +MRSA  - repeat BCx 10/9 gm positive clusters  - concern for endocarditis, but not a candidate for valve replacements    #ESRD  - HD M/W/F; HD 10/9 cut short due to intradialytic hypotension  - epogen and vancomycin on HD days  - Shiley placed in ICU 10/4  - Nephro consult appreciated  - daily weights    #Afib  - rate controlled  - h/o CAD s/p PCI, s/p MVR  - AC held for thrombocytopenia which is improving; if FOBT is negative may restart eliquis  - c/w ASA    #Hypothyroidism  - uncontrolled on synthroid 137mcg  - TSH 31.921  - c/w synthroid 150mcg  - F/U outpatient to titrate as needed    #h/o hemorrhoids, proctitis  - c/w hydrocortisone suppository, mesalamine  - c/w bowel regimen    #DVT ppx  - sc heparin    #GI ppx  - c/w pantoprazole 40mg qd    Full code. Palliative on board. Patient is a 78M h/o ESRD, Afib on Eliquis, CAD s/p PCI, prosthetic s/p MVR, h/o proctitis, hemorrhoids, hypothyroidism was in ICU for metabolic encephalopathy, septic shock 2/2 MRSA pneumonia s/p tunnel cath removal 10/3, IJ Shiley placed 10/4, downgraded to 3E for further management.    #Septic shock 2/2 MRSA bacteremia  #Metabolic encephalopathy  - sepsis picture improved  - awake, AOx0-1  - plan to wean off midodrine as tolerated  - PT consult  - maintain O2 sat >94%  - speech and swallow eval: puree with thin liquids    #MRSA bacteremia  - permacath removed 10/3, shiley placed 10/4  - TTE 10/1/23 EF 45-50%, no vegetations  - c/w ceftaroline q8 per ID  - c/w vancomycin post-dialysis  - BCx 10/6 +MRSA  - repeat BCx 10/9 gm positive clusters  - concern for endocarditis, but not a candidate for valve replacements    #ESRD  - HD M/W/F; HD 10/9 cut short due to intradialytic hypotension  - epogen and vancomycin on HD days  - Shiley placed in ICU 10/4  - Nephro consult appreciated  - daily weights    #Afib  - rate controlled  - h/o CAD s/p PCI, s/p MVR  - AC held for thrombocytopenia which is improving  - restart Eliquis 5mg bid  - c/w ASA    #Hypothyroidism  - uncontrolled on synthroid 137mcg  - TSH 31.921  - c/w synthroid 150mcg  - F/U outpatient to titrate as needed    #h/o hemorrhoids, proctitis  - c/w hydrocortisone suppository, mesalamine  - c/w bowel regimen    #DVT ppx  - Eliquis 5mg bid    #GI ppx  - c/w pantoprazole 40mg qd    Full code. Palliative on board. Patient is a 78M h/o ESRD, Afib on Eliquis, CAD s/p PCI, prosthetic s/p MVR, h/o proctitis, hemorrhoids, hypothyroidism was in ICU for metabolic encephalopathy, septic shock 2/2 MRSA bacteremia s/p tunnel cath removal 10/3, IJ Shiley placed 10/4, downgraded to 3E for further management.    #Septic shock 2/2 MRSA bacteremia  #Metabolic encephalopathy  - awake, AOx0-1  - CTH 10/4 was negative for acute intracranial pathology  - plan to wean off midodrine as tolerated  - PT consult  - maintain O2 sat >94%  - speech and swallow eval: puree with thin liquids  - Aspiration precaution     #MRSA bacteremia  - permacath removed 10/3, shiley placed 10/4  - TTE 10/1/23 EF 45-50%, no vegetations  - c/w ceftaroline q8 per ID  - c/w vancomycin post-dialysis  - BCx 10/6 +MRSA  - repeat BCx 10/9 gm positive clusters  - concern for endocarditis, but not a candidate for valve replacements    #ESRD  - HD M/W/F; HD 10/9 cut short due to intradialytic hypotension  - epogen and vancomycin on HD days  - Shiley placed in ICU 10/4  - Nephro consult appreciated  - daily weights    #Afib  - rate controlled  - h/o CAD s/p PCI, s/p MVR  - AC held for thrombocytopenia which is improving  - restart Eliquis 5mg bid  - c/w ASA    #Hypothyroidism  - TSH elevated  - synthroid dose increased to 150mcg  - F/U outpatient to titrate as needed    #h/o hemorrhoids, proctitis  - c/w hydrocortisone suppository, mesalamine  - c/w bowel regimen    #DVT ppx  - Eliquis 5mg bid    #GI ppx  - c/w pantoprazole 40mg qd    Full code. Palliative on board.

## 2023-10-10 NOTE — PROGRESS NOTE ADULT - SUBJECTIVE AND OBJECTIVE BOX
JANEE ARCEO  [id number]    Cardiology re-consulted to evaluate for ERENDIRA    Chief Complaint: Septic shock/MRSA bacteremia    Interval History: The patient has been transferred out of ICU and on medical floor. He remains altered and only oriented to his name.     Tele: not on telemetry       Current meds:   acetaminophen     Tablet .. 650 milliGRAM(s) Oral every 6 hours PRN  aluminum hydroxide/magnesium hydroxide/simethicone Suspension 10 milliLiter(s) Oral every 6 hours PRN  apixaban 5 milliGRAM(s) Oral two times a day  aspirin  chewable 81 milliGRAM(s) Oral daily  ceftaroline fosamil IVPB      ceftaroline fosamil IVPB 200 milliGRAM(s) IV Intermittent every 8 hours  chlorhexidine 2% Cloths 1 Application(s) Topical <User Schedule>  DAPTOmycin IVPB 600 milliGRAM(s) IV Intermittent every 48 hours  epoetin val (PROCRIT) Injectable 32092 Unit(s) IV Push <User Schedule>  folic acid 1 milliGRAM(s) Oral daily  hydrocortisone hemorrhoidal Suppository 1 Suppository(s) Rectal two times a day  influenza  Vaccine (HIGH DOSE) 0.7 milliLiter(s) IntraMuscular once  levothyroxine 150 MICROGram(s) Oral daily  mesalamine Suppository 1000 milliGRAM(s) Rectal at bedtime  midodrine 10 milliGRAM(s) Oral every 8 hours  pantoprazole   Suspension 40 milliGRAM(s) Enteral Tube daily  polyethylene glycol 3350 17 Gram(s) Oral daily  senna 2 Tablet(s) Oral at bedtime  sodium chloride 0.9% lock flush 10 milliLiter(s) IV Push every 1 hour PRN      Objective:     Vital Signs:   T(C): 36.9 (10-10-23 @ 12:24), Max: 36.9 (10-10-23 @ 12:24)  HR: 99 (10-10-23 @ 12:24) (93 - 107)  BP: 103/68 (10-10-23 @ 12:24) (103/68 - 132/68)  RR: 18 (10-10-23 @ 12:24) (17 - 18)  SpO2: 94% (10-10-23 @ 12:24) (93% - 94%)  Wt(kg): --    Physical Exam:   General: elderly man, altered, chronically ill appearing  HEENT: sclera anicteric  Neck: supple  CVS: JVP ~ 7 cm H20, irregularly irregular, s1, s2  Chest: decreased breath sounds  Abdomen: non-distended  Extremities: no lower extremity edema b/l  Neuro: minimally awake        Labs:   10 Oct 2023 06:36    136    |  100    |  56     ----------------------------<  115    4.4     |  27     |  4.17     Ca    9.6        10 Oct 2023 06:36  Phos  3.2       09 Oct 2023 07:00  Mg     1.9       09 Oct 2023 07:00    TPro  6.2    /  Alb  2.1    /  TBili  0.8    /  DBili  x      /  AST  17     /  ALT  15     /  AlkPhos  225    10 Oct 2023 06:36                          7.9    17.93 )-----------( 119      ( 10 Oct 2023 06:36 )             23.3           Coronary angiogram (7/2022):  LM: mild atherosclerosis  LAD: mid 50% stenosis  D1: 60% stenosis  D2: 90% stenosis  LCx: 95% stenosis  RCA: mild atherosclerosis   LIMA-D2 graft: no disease  SVG-OM1: no disease    ERENDIRA (3/2023):  LVEF 25-30%  Reduced RV systolic function   Moderate AR, Moderate-severe TR, Bio-MVR  No obvious vegetations visualized  No pericardial effusion seen.     TTE (8/2023):  LVEF 53%  Mildly enlarged RV size with reduced systolic function  Severe biatrial enlargement  Mild-moderate TR, Mild-moderate AR  Bio-MVR  No obvious evidence of endocarditis. Consider ERENDIRA if clinically indicated.    TTE (10/1/23):   1. 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 Biatrial enlargement   4. Left ventricular ejection fraction, by visual estimation, is 45 to 50%.   5. Mild mitral annular calcification.   6. Mild-moderate tricuspid regurgitation.   7. Mild aortic valve stenosis.      ECG (9/30/23): atrial fibrillation, PVC  ECG (10/3/23): atrial fibrillation, old inferior infarct, ventricular couplet   JANEE ARCEO  [id number]    Cardiology re-consulted to evaluate for ERENDIRA    Chief Complaint: Septic shock/MRSA bacteremia    Interval History: The patient has been transferred out of ICU and on medical floor. He remains altered and only oriented to his name.     Tele: not on telemetry       Current meds:   acetaminophen     Tablet .. 650 milliGRAM(s) Oral every 6 hours PRN  aluminum hydroxide/magnesium hydroxide/simethicone Suspension 10 milliLiter(s) Oral every 6 hours PRN  apixaban 5 milliGRAM(s) Oral two times a day  aspirin  chewable 81 milliGRAM(s) Oral daily  ceftaroline fosamil IVPB      ceftaroline fosamil IVPB 200 milliGRAM(s) IV Intermittent every 8 hours  chlorhexidine 2% Cloths 1 Application(s) Topical <User Schedule>  DAPTOmycin IVPB 600 milliGRAM(s) IV Intermittent every 48 hours  epoetin val (PROCRIT) Injectable 25229 Unit(s) IV Push <User Schedule>  folic acid 1 milliGRAM(s) Oral daily  hydrocortisone hemorrhoidal Suppository 1 Suppository(s) Rectal two times a day  influenza  Vaccine (HIGH DOSE) 0.7 milliLiter(s) IntraMuscular once  levothyroxine 150 MICROGram(s) Oral daily  mesalamine Suppository 1000 milliGRAM(s) Rectal at bedtime  midodrine 10 milliGRAM(s) Oral every 8 hours  pantoprazole   Suspension 40 milliGRAM(s) Enteral Tube daily  polyethylene glycol 3350 17 Gram(s) Oral daily  senna 2 Tablet(s) Oral at bedtime  sodium chloride 0.9% lock flush 10 milliLiter(s) IV Push every 1 hour PRN      Objective:     Vital Signs:   T(C): 36.9 (10-10-23 @ 12:24), Max: 36.9 (10-10-23 @ 12:24)  HR: 99 (10-10-23 @ 12:24) (93 - 107)  BP: 103/68 (10-10-23 @ 12:24) (103/68 - 132/68)  RR: 18 (10-10-23 @ 12:24) (17 - 18)  SpO2: 94% (10-10-23 @ 12:24) (93% - 94%)  Wt(kg): --    Physical Exam:   General: elderly man, altered, chronically ill appearing  HEENT: sclera anicteric  Neck: supple  CVS: JVP ~ 7 cm H20, irregularly irregular, s1, s2  Chest: decreased breath sounds  Abdomen: non-distended  Extremities: no lower extremity edema b/l  Neuro: minimally awake        Labs:   10 Oct 2023 06:36    136    |  100    |  56     ----------------------------<  115    4.4     |  27     |  4.17     Ca    9.6        10 Oct 2023 06:36  Phos  3.2       09 Oct 2023 07:00  Mg     1.9       09 Oct 2023 07:00    TPro  6.2    /  Alb  2.1    /  TBili  0.8    /  DBili  x      /  AST  17     /  ALT  15     /  AlkPhos  225    10 Oct 2023 06:36                          7.9    17.93 )-----------( 119      ( 10 Oct 2023 06:36 )             23.3           Coronary angiogram (7/2022):  LM: mild atherosclerosis  LAD: mid 50% stenosis  D1: 60% stenosis  D2: 90% stenosis  LCx: 95% stenosis  RCA: mild atherosclerosis   LIMA-D2 graft: no disease  SVG-OM1: no disease    ERENDIRA (3/2023):  LVEF 25-30%  Reduced RV systolic function   Moderate AR, Moderate-severe TR, Bio-MVR  No obvious vegetations visualized  No pericardial effusion seen.     TTE (8/2023):  LVEF 53%  Mildly enlarged RV size with reduced systolic function  Severe biatrial enlargement  Mild-moderate TR, Mild-moderate AR  Bio-MVR  No obvious evidence of endocarditis. Consider ERENDIRA if clinically indicated.    TTE (10/1/23):   1. 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 Biatrial enlargement   4. Left ventricular ejection fraction, by visual estimation, is 45 to 50%.   5. Mild mitral annular calcification.   6. Mild-moderate tricuspid regurgitation.   7. Mild aortic valve stenosis.      ECG (9/30/23): atrial fibrillation, PVC  ECG (10/3/23): atrial fibrillation, old inferior infarct, ventricular couplet   JANEE ARCEO  [id number]    Cardiology re-consulted to evaluate for ERENDIRA    Chief Complaint: Septic shock/MRSA bacteremia    Interval History: The patient has been transferred out of ICU and on medical floor. He remains altered and only oriented to his name.     Tele: not on telemetry       Current meds:   acetaminophen     Tablet .. 650 milliGRAM(s) Oral every 6 hours PRN  aluminum hydroxide/magnesium hydroxide/simethicone Suspension 10 milliLiter(s) Oral every 6 hours PRN  apixaban 5 milliGRAM(s) Oral two times a day  aspirin  chewable 81 milliGRAM(s) Oral daily  ceftaroline fosamil IVPB      ceftaroline fosamil IVPB 200 milliGRAM(s) IV Intermittent every 8 hours  chlorhexidine 2% Cloths 1 Application(s) Topical <User Schedule>  DAPTOmycin IVPB 600 milliGRAM(s) IV Intermittent every 48 hours  epoetin val (PROCRIT) Injectable 12700 Unit(s) IV Push <User Schedule>  folic acid 1 milliGRAM(s) Oral daily  hydrocortisone hemorrhoidal Suppository 1 Suppository(s) Rectal two times a day  influenza  Vaccine (HIGH DOSE) 0.7 milliLiter(s) IntraMuscular once  levothyroxine 150 MICROGram(s) Oral daily  mesalamine Suppository 1000 milliGRAM(s) Rectal at bedtime  midodrine 10 milliGRAM(s) Oral every 8 hours  pantoprazole   Suspension 40 milliGRAM(s) Enteral Tube daily  polyethylene glycol 3350 17 Gram(s) Oral daily  senna 2 Tablet(s) Oral at bedtime  sodium chloride 0.9% lock flush 10 milliLiter(s) IV Push every 1 hour PRN      Objective:     Vital Signs:   T(C): 36.9 (10-10-23 @ 12:24), Max: 36.9 (10-10-23 @ 12:24)  HR: 99 (10-10-23 @ 12:24) (93 - 107)  BP: 103/68 (10-10-23 @ 12:24) (103/68 - 132/68)  RR: 18 (10-10-23 @ 12:24) (17 - 18)  SpO2: 94% (10-10-23 @ 12:24) (93% - 94%)  Wt(kg): --    Physical Exam:   General: elderly man, altered, chronically ill appearing  HEENT: sclera anicteric  Neck: supple  CVS: JVP ~ 7 cm H20, irregularly irregular, s1, s2  Chest: decreased breath sounds  Abdomen: non-distended  Extremities: no lower extremity edema b/l  Neuro: minimally awake        Labs:   10 Oct 2023 06:36    136    |  100    |  56     ----------------------------<  115    4.4     |  27     |  4.17     Ca    9.6        10 Oct 2023 06:36  Phos  3.2       09 Oct 2023 07:00  Mg     1.9       09 Oct 2023 07:00    TPro  6.2    /  Alb  2.1    /  TBili  0.8    /  DBili  x      /  AST  17     /  ALT  15     /  AlkPhos  225    10 Oct 2023 06:36                          7.9    17.93 )-----------( 119      ( 10 Oct 2023 06:36 )             23.3           Coronary angiogram (7/2022):  LM: mild atherosclerosis  LAD: mid 50% stenosis  D1: 60% stenosis  D2: 90% stenosis  LCx: 95% stenosis  RCA: mild atherosclerosis   LIMA-D2 graft: no disease  SVG-OM1: no disease    ERENDIRA (3/2023):  LVEF 25-30%  Reduced RV systolic function   Moderate AR, Moderate-severe TR, Bio-MVR  No obvious vegetations visualized  No pericardial effusion seen.     TTE (8/2023):  LVEF 53%  Mildly enlarged RV size with reduced systolic function  Severe biatrial enlargement  Mild-moderate TR, Mild-moderate AR  Bio-MVR  No obvious evidence of endocarditis. Consider ERENDIRA if clinically indicated.    TTE (10/1/23):   1. 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 Biatrial enlargement   4. Left ventricular ejection fraction, by visual estimation, is 45 to 50%.   5. Mild mitral annular calcification.   6. Mild-moderate tricuspid regurgitation.   7. Mild aortic valve stenosis.      ECG (9/30/23): atrial fibrillation, PVC  ECG (10/3/23): atrial fibrillation, old inferior infarct, ventricular couplet

## 2023-10-11 LAB
-  AMPICILLIN/SULBACTAM: SIGNIFICANT CHANGE UP
-  CEFAZOLIN: SIGNIFICANT CHANGE UP
-  CLINDAMYCIN: SIGNIFICANT CHANGE UP
-  DAPTOMYCIN: SIGNIFICANT CHANGE UP
-  ERYTHROMYCIN: SIGNIFICANT CHANGE UP
-  GENTAMICIN: SIGNIFICANT CHANGE UP
-  LINEZOLID: SIGNIFICANT CHANGE UP
-  OXACILLIN: SIGNIFICANT CHANGE UP
-  PENICILLIN: SIGNIFICANT CHANGE UP
-  RIFAMPIN: SIGNIFICANT CHANGE UP
-  TETRACYCLINE: SIGNIFICANT CHANGE UP
-  TRIMETHOPRIM/SULFAMETHOXAZOLE: SIGNIFICANT CHANGE UP
-  VANCOMYCIN: SIGNIFICANT CHANGE UP
ALBUMIN SERPL ELPH-MCNC: 2 G/DL — LOW (ref 3.3–5)
ALP SERPL-CCNC: 216 U/L — HIGH (ref 40–120)
ALT FLD-CCNC: 15 U/L — SIGNIFICANT CHANGE UP (ref 10–45)
ANION GAP SERPL CALC-SCNC: 8 MMOL/L — SIGNIFICANT CHANGE UP (ref 5–17)
AST SERPL-CCNC: 14 U/L — SIGNIFICANT CHANGE UP (ref 10–40)
BASOPHILS # BLD AUTO: 0.09 K/UL — SIGNIFICANT CHANGE UP (ref 0–0.2)
BASOPHILS NFR BLD AUTO: 0.5 % — SIGNIFICANT CHANGE UP (ref 0–2)
BILIRUB SERPL-MCNC: 0.8 MG/DL — SIGNIFICANT CHANGE UP (ref 0.2–1.2)
BUN SERPL-MCNC: 66 MG/DL — HIGH (ref 7–23)
CALCIUM SERPL-MCNC: 9.3 MG/DL — SIGNIFICANT CHANGE UP (ref 8.4–10.5)
CHLORIDE SERPL-SCNC: 98 MMOL/L — SIGNIFICANT CHANGE UP (ref 96–108)
CK SERPL-CCNC: <7 U/L — LOW (ref 30–200)
CO2 SERPL-SCNC: 28 MMOL/L — SIGNIFICANT CHANGE UP (ref 22–31)
CREAT SERPL-MCNC: 4.68 MG/DL — HIGH (ref 0.5–1.3)
CULTURE RESULTS: SIGNIFICANT CHANGE UP
EGFR: 12 ML/MIN/1.73M2 — LOW
EOSINOPHIL # BLD AUTO: 0.15 K/UL — SIGNIFICANT CHANGE UP (ref 0–0.5)
EOSINOPHIL NFR BLD AUTO: 0.9 % — SIGNIFICANT CHANGE UP (ref 0–6)
GLUCOSE SERPL-MCNC: 99 MG/DL — SIGNIFICANT CHANGE UP (ref 70–99)
HCT VFR BLD CALC: 23.1 % — LOW (ref 39–50)
HGB BLD-MCNC: 7.7 G/DL — LOW (ref 13–17)
IMM GRANULOCYTES NFR BLD AUTO: 0.6 % — SIGNIFICANT CHANGE UP (ref 0–0.9)
LYMPHOCYTES # BLD AUTO: 1.01 K/UL — SIGNIFICANT CHANGE UP (ref 1–3.3)
LYMPHOCYTES # BLD AUTO: 6.1 % — LOW (ref 13–44)
MCHC RBC-ENTMCNC: 30.6 PG — SIGNIFICANT CHANGE UP (ref 27–34)
MCHC RBC-ENTMCNC: 33.3 GM/DL — SIGNIFICANT CHANGE UP (ref 32–36)
MCV RBC AUTO: 91.7 FL — SIGNIFICANT CHANGE UP (ref 80–100)
METHOD TYPE: SIGNIFICANT CHANGE UP
MONOCYTES # BLD AUTO: 1.08 K/UL — HIGH (ref 0–0.9)
MONOCYTES NFR BLD AUTO: 6.5 % — SIGNIFICANT CHANGE UP (ref 2–14)
NEUTROPHILS # BLD AUTO: 14.22 K/UL — HIGH (ref 1.8–7.4)
NEUTROPHILS NFR BLD AUTO: 85.4 % — HIGH (ref 43–77)
NRBC # BLD: 0 /100 WBCS — SIGNIFICANT CHANGE UP (ref 0–0)
ORGANISM # SPEC MICROSCOPIC CNT: SIGNIFICANT CHANGE UP
PLATELET # BLD AUTO: 126 K/UL — LOW (ref 150–400)
POTASSIUM SERPL-MCNC: 4.5 MMOL/L — SIGNIFICANT CHANGE UP (ref 3.5–5.3)
POTASSIUM SERPL-SCNC: 4.5 MMOL/L — SIGNIFICANT CHANGE UP (ref 3.5–5.3)
PROT SERPL-MCNC: 6.4 G/DL — SIGNIFICANT CHANGE UP (ref 6–8.3)
RBC # BLD: 2.52 M/UL — LOW (ref 4.2–5.8)
RBC # FLD: 15.9 % — HIGH (ref 10.3–14.5)
SODIUM SERPL-SCNC: 134 MMOL/L — LOW (ref 135–145)
SPECIMEN SOURCE: SIGNIFICANT CHANGE UP
WBC # BLD: 16.65 K/UL — HIGH (ref 3.8–10.5)
WBC # FLD AUTO: 16.65 K/UL — HIGH (ref 3.8–10.5)

## 2023-10-11 PROCEDURE — 99232 SBSQ HOSP IP/OBS MODERATE 35: CPT

## 2023-10-11 PROCEDURE — 93308 TTE F-UP OR LMTD: CPT | Mod: 26

## 2023-10-11 PROCEDURE — 71250 CT THORAX DX C-: CPT | Mod: 26

## 2023-10-11 PROCEDURE — 99497 ADVNCD CARE PLAN 30 MIN: CPT

## 2023-10-11 PROCEDURE — 99233 SBSQ HOSP IP/OBS HIGH 50: CPT

## 2023-10-11 PROCEDURE — 74176 CT ABD & PELVIS W/O CONTRAST: CPT | Mod: 26

## 2023-10-11 PROCEDURE — 76770 US EXAM ABDO BACK WALL COMP: CPT | Mod: 26

## 2023-10-11 RX ADMIN — CEFTAROLINE FOSAMIL 50 MILLIGRAM(S): 600 POWDER, FOR SOLUTION INTRAVENOUS at 17:14

## 2023-10-11 RX ADMIN — SENNA PLUS 2 TABLET(S): 8.6 TABLET ORAL at 22:01

## 2023-10-11 RX ADMIN — Medication 81 MILLIGRAM(S): at 12:48

## 2023-10-11 RX ADMIN — PANTOPRAZOLE SODIUM 40 MILLIGRAM(S): 20 TABLET, DELAYED RELEASE ORAL at 12:50

## 2023-10-11 RX ADMIN — POLYETHYLENE GLYCOL 3350 17 GRAM(S): 17 POWDER, FOR SOLUTION ORAL at 12:48

## 2023-10-11 RX ADMIN — CHLORHEXIDINE GLUCONATE 1 APPLICATION(S): 213 SOLUTION TOPICAL at 06:13

## 2023-10-11 RX ADMIN — CEFTAROLINE FOSAMIL 50 MILLIGRAM(S): 600 POWDER, FOR SOLUTION INTRAVENOUS at 22:02

## 2023-10-11 RX ADMIN — APIXABAN 5 MILLIGRAM(S): 2.5 TABLET, FILM COATED ORAL at 18:34

## 2023-10-11 RX ADMIN — CEFTAROLINE FOSAMIL 50 MILLIGRAM(S): 600 POWDER, FOR SOLUTION INTRAVENOUS at 06:13

## 2023-10-11 RX ADMIN — APIXABAN 5 MILLIGRAM(S): 2.5 TABLET, FILM COATED ORAL at 06:13

## 2023-10-11 RX ADMIN — ERYTHROPOIETIN 10000 UNIT(S): 10000 INJECTION, SOLUTION INTRAVENOUS; SUBCUTANEOUS at 14:52

## 2023-10-11 RX ADMIN — Medication 1 SUPPOSITORY(S): at 19:07

## 2023-10-11 RX ADMIN — Medication 1 MILLIGRAM(S): at 12:47

## 2023-10-11 RX ADMIN — MIDODRINE HYDROCHLORIDE 10 MILLIGRAM(S): 2.5 TABLET ORAL at 06:12

## 2023-10-11 RX ADMIN — Medication 1 SUPPOSITORY(S): at 06:10

## 2023-10-11 RX ADMIN — Medication 150 MICROGRAM(S): at 06:12

## 2023-10-11 RX ADMIN — MIDODRINE HYDROCHLORIDE 10 MILLIGRAM(S): 2.5 TABLET ORAL at 17:13

## 2023-10-11 NOTE — PROGRESS NOTE ADULT - SUBJECTIVE AND OBJECTIVE BOX
JANEE ARCEO, 78y Male  MRN: [id number]  ATTENDING: Martin Almaguer    HPI:  78M, PMHx MVR, PPM, s/p removal for MSSA infection, ESRD, on dialysis, Clostridium bacteremia in August (proctitis source) admitted with fever (102.2) found with high-grade sustained MRSA bacteremia.  On 10/6/2023 patient had permacath removed.  Brought spectrum antibiotic coverage.  Not a candidate for surgical valve removal.  Patient is poor historian.  Hematology consulted for anemia and thrombocytopenia.    MEDICATIONS:  acetaminophen     Tablet .. 650 milliGRAM(s) Oral every 6 hours PRN  aluminum hydroxide/magnesium hydroxide/simethicone Suspension 10 milliLiter(s) Oral every 6 hours PRN  aspirin  chewable 81 milliGRAM(s) Oral daily  ceftaroline fosamil IVPB 200 milliGRAM(s) IV Intermittent once  ceftaroline fosamil IVPB      ceftaroline fosamil IVPB 200 milliGRAM(s) IV Intermittent every 8 hours  chlorhexidine 2% Cloths 1 Application(s) Topical <User Schedule>  epoetin val (PROCRIT) Injectable 28217 Unit(s) IV Push <User Schedule>  folic acid 1 milliGRAM(s) Oral daily  heparin   Injectable 5000 Unit(s) SubCutaneous every 12 hours  hydrocortisone hemorrhoidal Suppository 1 Suppository(s) Rectal two times a day  influenza  Vaccine (HIGH DOSE) 0.7 milliLiter(s) IntraMuscular once  levothyroxine 137 MICROGram(s) Oral daily  mesalamine Suppository 1000 milliGRAM(s) Rectal at bedtime  midodrine 10 milliGRAM(s) Oral every 8 hours  pantoprazole   Suspension 40 milliGRAM(s) Enteral Tube daily  polyethylene glycol 3350 17 Gram(s) Oral daily  senna 2 Tablet(s) Oral at bedtime  sodium chloride 0.9% lock flush 10 milliLiter(s) IV Push every 1 hour PRN  vancomycin  IVPB 750 milliGRAM(s) IV Intermittent <User Schedule>    All other medications reviewed.    SUBJECTIVE:  Stable clinically; no fever, N/V, CP.    VITALS:  T(C): 36.6 (10-09-23 @ 12:30), Max: 37.7 (10-08-23 @ 21:19)  T(F): 97.9 (10-09-23 @ 12:30), Max: 99.9 (10-08-23 @ 21:19)  HR: 93 (10-09-23 @ 13:07) (74 - 122)  BP: 89/52 (10-09-23 @ 13:07) (89/52 - 127/66)      PHYSICAL EXAM:  Constitutional: alert, well developed  HEENT: normocephalic, anicteric sclerae, no mucositis or thrush  Respiratory: bilateral clear to auscultation anteriorly  Cardiovascular : S1, S2 regular, rhythmic, no murmurs, gallops or rubs  Abdomen: soft, distended, + normoactive BS, no palpable HS- megaly  Extremities: no tenderness;  -c/c/e, pulses equal bilaterally    LABS:  (10-09) WBC: 18.06 K/uL,Hemoglobin: 7.8 g/dL, Hematocrit: 22.2 %,  Platelet: 111 K/uL  (10-09) Na: 134 mmol/L ; K: 4.1 mmol/L ; BUN: 82 mg/dL ; Cr: 5.27 mg/dL.    RADIOLOGY:  ACC: [id number] EXAM:  CT BRAIN   ORDERED BY: JULIA CRUZ     PROCEDURE DATE:  10/04/2023          INTERPRETATION:  CLINICAL INFORMATION: Altered mental status.    TECHNIQUE:  Noncontrast axial CT images were acquired through the head.  Sagittal and coronal reformats were performed.    COMPARISON STUDY: None    FINDINGS:  There is no CT evidence of acute intracranial hemorrhage, mass effect or   midline shift.  There is no acute loss of gray matter-white matter   differentiation.    Slight prominence of the ventricles is compatible with mild age-related   involutional changes.  No clinically significant chronic microvascular   ischemic disease or other white matter pathology is visualized by CT   technique.      The paranasal sinuses and mastoids are grossly clear.    The patient is status post intraocular lens replacement bilaterally..    The calvarium and skull base appear within normal limits.    IMPRESSION:  No CT evidence of acute intracranial pathology.     JANEE ARCEO, 78y Male  MRN: [id number]  ATTENDING: Martin Amlaguer    HPI:  78M, PMHx MVR, PPM, s/p removal for MSSA infection, ESRD, on dialysis, Clostridium bacteremia in August (proctitis source) admitted with fever (102.2) found with high-grade sustained MRSA bacteremia.  On 10/6/2023 patient had permacath removed.  Brought spectrum antibiotic coverage.  Not a candidate for surgical valve removal.  Patient is poor historian.  Hematology consulted for anemia and thrombocytopenia.    MEDICATIONS:  acetaminophen     Tablet .. 650 milliGRAM(s) Oral every 6 hours PRN  aluminum hydroxide/magnesium hydroxide/simethicone Suspension 10 milliLiter(s) Oral every 6 hours PRN  aspirin  chewable 81 milliGRAM(s) Oral daily  ceftaroline fosamil IVPB 200 milliGRAM(s) IV Intermittent once  ceftaroline fosamil IVPB      ceftaroline fosamil IVPB 200 milliGRAM(s) IV Intermittent every 8 hours  chlorhexidine 2% Cloths 1 Application(s) Topical <User Schedule>  epoetin val (PROCRIT) Injectable 68346 Unit(s) IV Push <User Schedule>  folic acid 1 milliGRAM(s) Oral daily  heparin   Injectable 5000 Unit(s) SubCutaneous every 12 hours  hydrocortisone hemorrhoidal Suppository 1 Suppository(s) Rectal two times a day  influenza  Vaccine (HIGH DOSE) 0.7 milliLiter(s) IntraMuscular once  levothyroxine 137 MICROGram(s) Oral daily  mesalamine Suppository 1000 milliGRAM(s) Rectal at bedtime  midodrine 10 milliGRAM(s) Oral every 8 hours  pantoprazole   Suspension 40 milliGRAM(s) Enteral Tube daily  polyethylene glycol 3350 17 Gram(s) Oral daily  senna 2 Tablet(s) Oral at bedtime  sodium chloride 0.9% lock flush 10 milliLiter(s) IV Push every 1 hour PRN  vancomycin  IVPB 750 milliGRAM(s) IV Intermittent <User Schedule>    All other medications reviewed.    SUBJECTIVE:  Stable clinically; no fever, N/V, CP.    VITALS:  T(C): 36.6 (10-09-23 @ 12:30), Max: 37.7 (10-08-23 @ 21:19)  T(F): 97.9 (10-09-23 @ 12:30), Max: 99.9 (10-08-23 @ 21:19)  HR: 93 (10-09-23 @ 13:07) (74 - 122)  BP: 89/52 (10-09-23 @ 13:07) (89/52 - 127/66)      PHYSICAL EXAM:  Constitutional: alert, well developed  HEENT: normocephalic, anicteric sclerae, no mucositis or thrush  Respiratory: bilateral clear to auscultation anteriorly  Cardiovascular : S1, S2 regular, rhythmic, no murmurs, gallops or rubs  Abdomen: soft, distended, + normoactive BS, no palpable HS- megaly  Extremities: no tenderness;  -c/c/e, pulses equal bilaterally    LABS:  (10-09) WBC: 18.06 K/uL,Hemoglobin: 7.8 g/dL, Hematocrit: 22.2 %,  Platelet: 111 K/uL  (10-09) Na: 134 mmol/L ; K: 4.1 mmol/L ; BUN: 82 mg/dL ; Cr: 5.27 mg/dL.    RADIOLOGY:  ACC: [id number] EXAM:  CT BRAIN   ORDERED BY: JULIA CRUZ     PROCEDURE DATE:  10/04/2023          INTERPRETATION:  CLINICAL INFORMATION: Altered mental status.    TECHNIQUE:  Noncontrast axial CT images were acquired through the head.  Sagittal and coronal reformats were performed.    COMPARISON STUDY: None    FINDINGS:  There is no CT evidence of acute intracranial hemorrhage, mass effect or   midline shift.  There is no acute loss of gray matter-white matter   differentiation.    Slight prominence of the ventricles is compatible with mild age-related   involutional changes.  No clinically significant chronic microvascular   ischemic disease or other white matter pathology is visualized by CT   technique.      The paranasal sinuses and mastoids are grossly clear.    The patient is status post intraocular lens replacement bilaterally..    The calvarium and skull base appear within normal limits.    IMPRESSION:  No CT evidence of acute intracranial pathology.     JANEE ARCEO, 78y Male  MRN: [id number]  ATTENDING: Martin Almaguer    HPI:  78M, PMHx MVR, PPM, s/p removal for MSSA infection, ESRD, on dialysis, Clostridium bacteremia in August (proctitis source) admitted with fever (102.2) found with high-grade sustained MRSA bacteremia.  On 10/6/2023 patient had permacath removed.  Brought spectrum antibiotic coverage.  Not a candidate for surgical valve removal.  Patient is poor historian.  Hematology consulted for anemia and thrombocytopenia.    MEDICATIONS:  acetaminophen     Tablet .. 650 milliGRAM(s) Oral every 6 hours PRN  aluminum hydroxide/magnesium hydroxide/simethicone Suspension 10 milliLiter(s) Oral every 6 hours PRN  aspirin  chewable 81 milliGRAM(s) Oral daily  ceftaroline fosamil IVPB 200 milliGRAM(s) IV Intermittent once  ceftaroline fosamil IVPB      ceftaroline fosamil IVPB 200 milliGRAM(s) IV Intermittent every 8 hours  chlorhexidine 2% Cloths 1 Application(s) Topical <User Schedule>  epoetin val (PROCRIT) Injectable 05027 Unit(s) IV Push <User Schedule>  folic acid 1 milliGRAM(s) Oral daily  heparin   Injectable 5000 Unit(s) SubCutaneous every 12 hours  hydrocortisone hemorrhoidal Suppository 1 Suppository(s) Rectal two times a day  influenza  Vaccine (HIGH DOSE) 0.7 milliLiter(s) IntraMuscular once  levothyroxine 137 MICROGram(s) Oral daily  mesalamine Suppository 1000 milliGRAM(s) Rectal at bedtime  midodrine 10 milliGRAM(s) Oral every 8 hours  pantoprazole   Suspension 40 milliGRAM(s) Enteral Tube daily  polyethylene glycol 3350 17 Gram(s) Oral daily  senna 2 Tablet(s) Oral at bedtime  sodium chloride 0.9% lock flush 10 milliLiter(s) IV Push every 1 hour PRN  vancomycin  IVPB 750 milliGRAM(s) IV Intermittent <User Schedule>    All other medications reviewed.    SUBJECTIVE:  Stable clinically; no fever, N/V, CP.    VITALS:  T(C): 36.6 (10-09-23 @ 12:30), Max: 37.7 (10-08-23 @ 21:19)  T(F): 97.9 (10-09-23 @ 12:30), Max: 99.9 (10-08-23 @ 21:19)  HR: 93 (10-09-23 @ 13:07) (74 - 122)  BP: 89/52 (10-09-23 @ 13:07) (89/52 - 127/66)      PHYSICAL EXAM:  Constitutional: alert, well developed  HEENT: normocephalic, anicteric sclerae, no mucositis or thrush  Respiratory: bilateral clear to auscultation anteriorly  Cardiovascular : S1, S2 regular, rhythmic, no murmurs, gallops or rubs  Abdomen: soft, distended, + normoactive BS, no palpable HS- megaly  Extremities: no tenderness;  -c/c/e, pulses equal bilaterally    LABS:  (10-09) WBC: 18.06 K/uL,Hemoglobin: 7.8 g/dL, Hematocrit: 22.2 %,  Platelet: 111 K/uL  (10-09) Na: 134 mmol/L ; K: 4.1 mmol/L ; BUN: 82 mg/dL ; Cr: 5.27 mg/dL.    RADIOLOGY:  ACC: [id number] EXAM:  CT BRAIN   ORDERED BY: JULIA CRUZ     PROCEDURE DATE:  10/04/2023          INTERPRETATION:  CLINICAL INFORMATION: Altered mental status.    TECHNIQUE:  Noncontrast axial CT images were acquired through the head.  Sagittal and coronal reformats were performed.    COMPARISON STUDY: None    FINDINGS:  There is no CT evidence of acute intracranial hemorrhage, mass effect or   midline shift.  There is no acute loss of gray matter-white matter   differentiation.    Slight prominence of the ventricles is compatible with mild age-related   involutional changes.  No clinically significant chronic microvascular   ischemic disease or other white matter pathology is visualized by CT   technique.      The paranasal sinuses and mastoids are grossly clear.    The patient is status post intraocular lens replacement bilaterally..    The calvarium and skull base appear within normal limits.    IMPRESSION:  No CT evidence of acute intracranial pathology.

## 2023-10-11 NOTE — PROGRESS NOTE ADULT - ASSESSMENT
A/P 77 yo M pmhx ESRD came to ED complaining of fatigue from GG JOEL . Patient  also febrile to 102.2 in ED with complains of cough and wheezing.  Noted to be hypotensive to 80's systolic. Course in ICU , now  downgraded to 3E.   Persistent MRSA bacteremia despite antibiotics and continues w/ AMS             Septic shock 2/2 MRSA bacteremia  Metabolic encephalopathy  - awake, AOx0-1  - CTH 10/4 was negative for acute intracranial pathology  - speech and swallow eval: puree with thin liquids  - Aspiration precaution       MRSA bacteremia  - perma cath removed 10/3, shiley placed 10/4  - TTE 10/1/23 EF 45-50%, no vegetations  - ID following  - c/w ceftaroline q8   - concern for endocarditis, but not a candidate for valve replacements  - Cardiology following - appreciate their  input and discussion they had w/ pts outpt cardiologist Dr Lainez :       patient's out pt cardiologist  prefers conservative approach and does not feel that ERENDIRA is appropriate for this pt given his overall condition           ESRD  - HD M/W/F;   - Nephro follows -  appreciated      Palliative ;   as a follow up , chart reviewed, and updated consult notes read, appreciate Cards and ID . Reviewed case further today w/ Dr Bass.    I saw pt bedside , wakes only w/ stim , and was saying more things today . Oriented to self, and his girlfriend  Surekha, but unable to say where he is   and is unaware of  what is going on with him medically.   Had lengthy d/w Surekha today , she says she is coming  to terms with what is happening, and became tearful.    She is updated by  med team and cards . See [facility] note above , again reviewed code status with her , she is leaning towards dnr/dni but said will  let me know after speaking w/ the pts out pt cardiologist .  She has my contact info ., discussed I will cont to follow  w/ med teams for support .       I called for  to visit as requested .            A/P 77 yo M pmhx ESRD came to ED complaining of fatigue from GG JOEL . Patient  also febrile to 102.2 in ED with complains of cough and wheezing.  Noted to be hypotensive to 80's systolic. Course in ICU , now  downgraded to 3E.   Persistent MRSA bacteremia despite antibiotics and continues w/ AMS             Septic shock 2/2 MRSA bacteremia  Metabolic encephalopathy  - awake, AOx0-1  - CTH 10/4 was negative for acute intracranial pathology  - speech and swallow eval: puree with thin liquids  - Aspiration precaution       MRSA bacteremia  - perma cath removed 10/3, shiley placed 10/4  - TTE 10/1/23 EF 45-50%, no vegetations  - ID following  - c/w ceftaroline q8   - concern for endocarditis, but not a candidate for valve replacements  - Cardiology following - appreciate their  input and discussion they had w/ pts outpt cardiologist Dr Lainez :       patient's out pt cardiologist  prefers conservative approach and does not feel that ERENDIRA is appropriate for this pt given his overall condition           ESRD  - HD M/W/F;   - Nephro follows -  appreciated      Palliative ;   as a follow up , chart reviewed, and updated consult notes read, appreciate Cards and ID . Reviewed case further today w/ Dr Bass.    I saw pt bedside , wakes only w/ stim , and was saying more things today . Oriented to self, and his girlfriend  Surekha, but unable to say where he is   and is unaware of  what is going on with him medically.   Had lengthy d/w Surekha today , she says she is coming  to terms with what is happening, and became tearful.    She is updated by  med team and cards . See [facility] note above , again reviewed code status with her , she is leaning towards dnr/dni but said will  let me know after speaking w/ the pts out pt cardiologist .  She has my contact info ., discussed I will cont to follow  w/ med teams for support .       I called for  to visit as requested .            A/P 79 yo M pmhx ESRD came to ED complaining of fatigue from GG JOEL . Patient  also febrile to 102.2 in ED with complains of cough and wheezing.  Noted to be hypotensive to 80's systolic. Course in ICU , now  downgraded to 3E.   Persistent MRSA bacteremia despite antibiotics and continues w/ AMS             Septic shock 2/2 MRSA bacteremia  Metabolic encephalopathy  - awake, AOx0-1  - CTH 10/4 was negative for acute intracranial pathology  - speech and swallow eval: puree with thin liquids  - Aspiration precaution       MRSA bacteremia  - perma cath removed 10/3, shiley placed 10/4  - TTE 10/1/23 EF 45-50%, no vegetations  - ID following  - c/w ceftaroline q8   - concern for endocarditis, but not a candidate for valve replacements  - Cardiology following - appreciate their  input and discussion they had w/ pts outpt cardiologist Dr Lainez :       patient's out pt cardiologist  prefers conservative approach and does not feel that ERENDIRA is appropriate for this pt given his overall condition           ESRD  - HD M/W/F;   - Nephro follows -  appreciated      Palliative ;   as a follow up , chart reviewed, and updated consult notes read, appreciate Cards and ID . Reviewed case further today w/ Dr Bass.    I saw pt bedside , wakes only w/ stim , and was saying more things today . Oriented to self, and his girlfriend  Surekha, but unable to say where he is   and is unaware of  what is going on with him medically.   Had lengthy d/w Surekha today , she says she is coming  to terms with what is happening, and became tearful.    She is updated by  med team and cards . See [facility] note above , again reviewed code status with her , she is leaning towards dnr/dni but said will  let me know after speaking w/ the pts out pt cardiologist .  She has my contact info ., discussed I will cont to follow  w/ med teams for support .       I called for  to visit as requested .

## 2023-10-11 NOTE — PROGRESS NOTE ADULT - SUBJECTIVE AND OBJECTIVE BOX
Progress:   still mostly lethargic, wakes more w/ stim., speaking more     Present Symptoms:   Dyspnea: no  Nausea/Vomiting:   Anxiety:    Depressed Mood:   Fatigue:   Loss of appetite:   Pain:            no       location:   Review of Systems: [ Unable to obtain due to poor mentation]    MEDICATIONS  (STANDING):  apixaban 5 milliGRAM(s) Oral two times a day  aspirin  chewable 81 milliGRAM(s) Oral daily  ceftaroline fosamil IVPB 200 milliGRAM(s) IV Intermittent every 8 hours  ceftaroline fosamil IVPB      chlorhexidine 2% Cloths 1 Application(s) Topical <User Schedule>  DAPTOmycin IVPB 600 milliGRAM(s) IV Intermittent every 48 hours  epoetin val (PROCRIT) Injectable 72065 Unit(s) IV Push <User Schedule>  folic acid 1 milliGRAM(s) Oral daily  hydrocortisone hemorrhoidal Suppository 1 Suppository(s) Rectal two times a day  influenza  Vaccine (HIGH DOSE) 0.7 milliLiter(s) IntraMuscular once  levothyroxine 150 MICROGram(s) Oral daily  mesalamine Suppository 1000 milliGRAM(s) Rectal at bedtime  midodrine 10 milliGRAM(s) Oral every 8 hours  pantoprazole   Suspension 40 milliGRAM(s) Enteral Tube daily  polyethylene glycol 3350 17 Gram(s) Oral daily  senna 2 Tablet(s) Oral at bedtime    MEDICATIONS  (PRN):  acetaminophen     Tablet .. 650 milliGRAM(s) Oral every 6 hours PRN Mild Pain (1 - 3)  aluminum hydroxide/magnesium hydroxide/simethicone Suspension 10 milliLiter(s) Oral every 6 hours PRN dyspepsia  sodium chloride 0.9% lock flush 10 milliLiter(s) IV Push every 1 hour PRN Pre/post blood products, medications, blood draw, and to maintain line patency      PHYSICAL EXAM:  Vital Signs Last 24 Hrs  T(C): 36.5 (11 Oct 2023 05:07), Max: 36.9 (10 Oct 2023 12:24)  T(F): 97.7 (11 Oct 2023 05:07), Max: 98.4 (10 Oct 2023 12:24)  HR: 90 (11 Oct 2023 05:07) (87 - 99)  BP: 106/72 (11 Oct 2023 05:07) (103/68 - 114/59)  BP(mean): --  RR: 18 (11 Oct 2023 05:07) (18 - 18)  SpO2: 95% (11 Oct 2023 05:07) (94% - 98%)    Parameters below as of 11 Oct 2023 05:07  Patient On (Oxygen Delivery Method): room air      General: alert  oriented x 1 to self, knows girlfriend at bedside       HEENT: n/c, a/t, mouth lips dry     Lungs: ess cl dim bases , resp non labored    CV: normal  -tachy  GI: abd lrg., soft ,     : normal / cc    Musculoskeletal: edematous x 4 and weak/stiff    Skin: pale, w/d     Neuro: mainly lethargic, wakes w/ stim , speak few words    Oral intake ability:  oral feeding- min w/ assist if awake enough   Diet: as nupur , soft     LABS:                          7.7    16.65 )-----------( 126      ( 11 Oct 2023 05:26 )             23.1     10-11    134<L>  |  98  |  66<H>  ----------------------------<  99  4.5   |  28  |  4.68<H>    Ca    9.3      11 Oct 2023 05:26    TPro  6.4  /  Alb  2.0<L>  /  TBili  0.8  /  DBili  x   /  AST  14  /  ALT  15  /  AlkPhos  216<H>  10-11    Urinalysis Basic - ( 11 Oct 2023 05:26 )    Color: x / Appearance: x / SG: x / pH: x  Gluc: 99 mg/dL / Ketone: x  / Bili: x / Urobili: x   Blood: x / Protein: x / Nitrite: x   Leuk Esterase: x / RBC: x / WBC x   Sq Epi: x / Non Sq Epi: x / Bacteria: x        RADIOLOGY & ADDITIONAL STUDIES:     ADVANCE DIRECTIVES:  HCP   full code   Advanced Care Planning discussion total time spent:     Progress:   still mostly lethargic, wakes more w/ stim., speaking more     Present Symptoms:   Dyspnea: no  Nausea/Vomiting:   Anxiety:    Depressed Mood:   Fatigue:   Loss of appetite:   Pain:            no       location:   Review of Systems: [ Unable to obtain due to poor mentation]    MEDICATIONS  (STANDING):  apixaban 5 milliGRAM(s) Oral two times a day  aspirin  chewable 81 milliGRAM(s) Oral daily  ceftaroline fosamil IVPB 200 milliGRAM(s) IV Intermittent every 8 hours  ceftaroline fosamil IVPB      chlorhexidine 2% Cloths 1 Application(s) Topical <User Schedule>  DAPTOmycin IVPB 600 milliGRAM(s) IV Intermittent every 48 hours  epoetin val (PROCRIT) Injectable 20202 Unit(s) IV Push <User Schedule>  folic acid 1 milliGRAM(s) Oral daily  hydrocortisone hemorrhoidal Suppository 1 Suppository(s) Rectal two times a day  influenza  Vaccine (HIGH DOSE) 0.7 milliLiter(s) IntraMuscular once  levothyroxine 150 MICROGram(s) Oral daily  mesalamine Suppository 1000 milliGRAM(s) Rectal at bedtime  midodrine 10 milliGRAM(s) Oral every 8 hours  pantoprazole   Suspension 40 milliGRAM(s) Enteral Tube daily  polyethylene glycol 3350 17 Gram(s) Oral daily  senna 2 Tablet(s) Oral at bedtime    MEDICATIONS  (PRN):  acetaminophen     Tablet .. 650 milliGRAM(s) Oral every 6 hours PRN Mild Pain (1 - 3)  aluminum hydroxide/magnesium hydroxide/simethicone Suspension 10 milliLiter(s) Oral every 6 hours PRN dyspepsia  sodium chloride 0.9% lock flush 10 milliLiter(s) IV Push every 1 hour PRN Pre/post blood products, medications, blood draw, and to maintain line patency      PHYSICAL EXAM:  Vital Signs Last 24 Hrs  T(C): 36.5 (11 Oct 2023 05:07), Max: 36.9 (10 Oct 2023 12:24)  T(F): 97.7 (11 Oct 2023 05:07), Max: 98.4 (10 Oct 2023 12:24)  HR: 90 (11 Oct 2023 05:07) (87 - 99)  BP: 106/72 (11 Oct 2023 05:07) (103/68 - 114/59)  BP(mean): --  RR: 18 (11 Oct 2023 05:07) (18 - 18)  SpO2: 95% (11 Oct 2023 05:07) (94% - 98%)    Parameters below as of 11 Oct 2023 05:07  Patient On (Oxygen Delivery Method): room air      General: alert  oriented x 1 to self, knows girlfriend at bedside       HEENT: n/c, a/t, mouth lips dry     Lungs: ess cl dim bases , resp non labored    CV: normal  -tachy  GI: abd lrg., soft ,     : normal / cc    Musculoskeletal: edematous x 4 and weak/stiff    Skin: pale, w/d     Neuro: mainly lethargic, wakes w/ stim , speak few words    Oral intake ability:  oral feeding- min w/ assist if awake enough   Diet: as nupur , soft     LABS:                          7.7    16.65 )-----------( 126      ( 11 Oct 2023 05:26 )             23.1     10-11    134<L>  |  98  |  66<H>  ----------------------------<  99  4.5   |  28  |  4.68<H>    Ca    9.3      11 Oct 2023 05:26    TPro  6.4  /  Alb  2.0<L>  /  TBili  0.8  /  DBili  x   /  AST  14  /  ALT  15  /  AlkPhos  216<H>  10-11    Urinalysis Basic - ( 11 Oct 2023 05:26 )    Color: x / Appearance: x / SG: x / pH: x  Gluc: 99 mg/dL / Ketone: x  / Bili: x / Urobili: x   Blood: x / Protein: x / Nitrite: x   Leuk Esterase: x / RBC: x / WBC x   Sq Epi: x / Non Sq Epi: x / Bacteria: x        RADIOLOGY & ADDITIONAL STUDIES:     ADVANCE DIRECTIVES:  HCP   full code   Advanced Care Planning discussion total time spent:     Progress:   still mostly lethargic, wakes more w/ stim., speaking more     Present Symptoms:   Dyspnea: no  Nausea/Vomiting:   Anxiety:    Depressed Mood:   Fatigue:   Loss of appetite:   Pain:            no       location:   Review of Systems: [ Unable to obtain due to poor mentation]    MEDICATIONS  (STANDING):  apixaban 5 milliGRAM(s) Oral two times a day  aspirin  chewable 81 milliGRAM(s) Oral daily  ceftaroline fosamil IVPB 200 milliGRAM(s) IV Intermittent every 8 hours  ceftaroline fosamil IVPB      chlorhexidine 2% Cloths 1 Application(s) Topical <User Schedule>  DAPTOmycin IVPB 600 milliGRAM(s) IV Intermittent every 48 hours  epoetin val (PROCRIT) Injectable 29778 Unit(s) IV Push <User Schedule>  folic acid 1 milliGRAM(s) Oral daily  hydrocortisone hemorrhoidal Suppository 1 Suppository(s) Rectal two times a day  influenza  Vaccine (HIGH DOSE) 0.7 milliLiter(s) IntraMuscular once  levothyroxine 150 MICROGram(s) Oral daily  mesalamine Suppository 1000 milliGRAM(s) Rectal at bedtime  midodrine 10 milliGRAM(s) Oral every 8 hours  pantoprazole   Suspension 40 milliGRAM(s) Enteral Tube daily  polyethylene glycol 3350 17 Gram(s) Oral daily  senna 2 Tablet(s) Oral at bedtime    MEDICATIONS  (PRN):  acetaminophen     Tablet .. 650 milliGRAM(s) Oral every 6 hours PRN Mild Pain (1 - 3)  aluminum hydroxide/magnesium hydroxide/simethicone Suspension 10 milliLiter(s) Oral every 6 hours PRN dyspepsia  sodium chloride 0.9% lock flush 10 milliLiter(s) IV Push every 1 hour PRN Pre/post blood products, medications, blood draw, and to maintain line patency      PHYSICAL EXAM:  Vital Signs Last 24 Hrs  T(C): 36.5 (11 Oct 2023 05:07), Max: 36.9 (10 Oct 2023 12:24)  T(F): 97.7 (11 Oct 2023 05:07), Max: 98.4 (10 Oct 2023 12:24)  HR: 90 (11 Oct 2023 05:07) (87 - 99)  BP: 106/72 (11 Oct 2023 05:07) (103/68 - 114/59)  BP(mean): --  RR: 18 (11 Oct 2023 05:07) (18 - 18)  SpO2: 95% (11 Oct 2023 05:07) (94% - 98%)    Parameters below as of 11 Oct 2023 05:07  Patient On (Oxygen Delivery Method): room air      General: alert  oriented x 1 to self, knows girlfriend at bedside       HEENT: n/c, a/t, mouth lips dry     Lungs: ess cl dim bases , resp non labored    CV: normal  -tachy  GI: abd lrg., soft ,     : normal / cc    Musculoskeletal: edematous x 4 and weak/stiff    Skin: pale, w/d     Neuro: mainly lethargic, wakes w/ stim , speak few words    Oral intake ability:  oral feeding- min w/ assist if awake enough   Diet: as nupur , soft     LABS:                          7.7    16.65 )-----------( 126      ( 11 Oct 2023 05:26 )             23.1     10-11    134<L>  |  98  |  66<H>  ----------------------------<  99  4.5   |  28  |  4.68<H>    Ca    9.3      11 Oct 2023 05:26    TPro  6.4  /  Alb  2.0<L>  /  TBili  0.8  /  DBili  x   /  AST  14  /  ALT  15  /  AlkPhos  216<H>  10-11    Urinalysis Basic - ( 11 Oct 2023 05:26 )    Color: x / Appearance: x / SG: x / pH: x  Gluc: 99 mg/dL / Ketone: x  / Bili: x / Urobili: x   Blood: x / Protein: x / Nitrite: x   Leuk Esterase: x / RBC: x / WBC x   Sq Epi: x / Non Sq Epi: x / Bacteria: x        RADIOLOGY & ADDITIONAL STUDIES:     ADVANCE DIRECTIVES:  HCP   full code   Advanced Care Planning discussion total time spent:

## 2023-10-11 NOTE — PROGRESS NOTE ADULT - SUBJECTIVE AND OBJECTIVE BOX
Patient is a 78y old  Male who presents with a chief complaint of Sepsis (11 Oct 2023 10:29)      INTERVAL HPI/OVERNIGHT EVENTS: Patient seen and examined at bedside. No overnight events.    MEDICATIONS  (STANDING):  apixaban 5 milliGRAM(s) Oral two times a day  aspirin  chewable 81 milliGRAM(s) Oral daily  ceftaroline fosamil IVPB      ceftaroline fosamil IVPB 200 milliGRAM(s) IV Intermittent every 8 hours  chlorhexidine 2% Cloths 1 Application(s) Topical <User Schedule>  DAPTOmycin IVPB 600 milliGRAM(s) IV Intermittent every 48 hours  epoetin val (PROCRIT) Injectable 54564 Unit(s) IV Push <User Schedule>  folic acid 1 milliGRAM(s) Oral daily  hydrocortisone hemorrhoidal Suppository 1 Suppository(s) Rectal two times a day  influenza  Vaccine (HIGH DOSE) 0.7 milliLiter(s) IntraMuscular once  levothyroxine 150 MICROGram(s) Oral daily  mesalamine Suppository 1000 milliGRAM(s) Rectal at bedtime  midodrine 10 milliGRAM(s) Oral every 8 hours  pantoprazole   Suspension 40 milliGRAM(s) Enteral Tube daily  polyethylene glycol 3350 17 Gram(s) Oral daily  senna 2 Tablet(s) Oral at bedtime    MEDICATIONS  (PRN):  acetaminophen     Tablet .. 650 milliGRAM(s) Oral every 6 hours PRN Mild Pain (1 - 3)  aluminum hydroxide/magnesium hydroxide/simethicone Suspension 10 milliLiter(s) Oral every 6 hours PRN dyspepsia  sodium chloride 0.9% lock flush 10 milliLiter(s) IV Push every 1 hour PRN Pre/post blood products, medications, blood draw, and to maintain line patency      Allergies    levofloxacin (Unknown)  alfuzosin (Unknown)  tamsulosin (Unknown)  Myrbetriq (Unknown)    Intolerances      Vital Signs Last 24 Hrs  T(C): 36.5 (11 Oct 2023 05:07), Max: 36.9 (10 Oct 2023 12:24)  T(F): 97.7 (11 Oct 2023 05:07), Max: 98.4 (10 Oct 2023 12:24)  HR: 90 (11 Oct 2023 05:07) (87 - 99)  BP: 106/72 (11 Oct 2023 05:07) (103/68 - 114/59)  BP(mean): --  RR: 18 (11 Oct 2023 05:07) (18 - 18)  SpO2: 95% (11 Oct 2023 05:07) (94% - 98%)    Parameters below as of 11 Oct 2023 05:07  Patient On (Oxygen Delivery Method): room air      I&O's Summary    11 Oct 2023 07:01  -  11 Oct 2023 10:40  --------------------------------------------------------  IN: 50 mL / OUT: 0 mL / NET: 50 mL          PHYSICAL EXAM:  GENERAL: NAD  HEENT:  AT/NC, moist mucous membranes  CHEST/LUNG:  CTA b/l, no rales, wheezes, or rhonchi,  normal respiratory effort, no intercostal retractions  HEART:  irregular HR, S1, S2  VASCULAR: rt IJ shiley in place  ABDOMEN:  BS+, soft, nontender, nondistended  MSK/EXTREMITIES: 2+ peripheral pulses, b/l upper ext edema, nontender, edema rt foot  NERVOUS SYSTEM: awake with limited response to questions, lethargic      LABS: Personally reviewed  CBC                        7.7    16.65 )-----------( 126      ( 11 Oct 2023 05:26 )             23.1     CMP  10-11    134  |  98  |  66  ----------------------------<  99  4.5   |  28  |  4.68    Ca    9.3      11 Oct 2023 05:26  Phos  3.2     10-09  Mg     1.9     10-09    TPro  6.4  /  Alb  2.0  /  TBili  0.8  /  DBili  x   /  AST  14  /  ALT  15  /  AlkPhos  216  10-11                CARDIAC MARKERS ( 11 Oct 2023 05:26 )  x     / x     / <7 U/L / x     / x            TSH 31.921   TSH with FT4 reflex --  Total T3 --                  Urinalysis Basic - ( 11 Oct 2023 05:26 )    Color: x / Appearance: x / SG: x / pH: x  Gluc: 99 mg/dL / Ketone: x  / Bili: x / Urobili: x   Blood: x / Protein: x / Nitrite: x   Leuk Esterase: x / RBC: x / WBC x   Sq Epi: x / Non Sq Epi: x / Bacteria: x        Culture - Blood (collected 09 Oct 2023 07:00)  Source: .Blood Blood  Gram Stain (10 Oct 2023 09:30):    Growth in aerobic bottle: Gram Positive Cocci in Clusters    Growth in anaerobic bottle: Gram Positive Cocci in Clusters  Preliminary Report (10 Oct 2023 23:18):    Growth in aerobic and anaerobic bottles: Staphylococcus aureus    Culture - Blood (collected 09 Oct 2023 07:00)  Source: .Blood Blood  Gram Stain (10 Oct 2023 06:14):    Growth in aerobic bottle: Gram Positive Cocci in Clusters    Growth in anaerobic bottle: Gram Positive Cocci in Clusters  Preliminary Report (10 Oct 2023 19:56):    Growth in aerobic and anaerobic bottles: Staphylococcus aureus    See previous culture [id number]    Culture - Blood (collected 06 Oct 2023 10:20)  Source: .Blood Blood-Peripheral  Gram Stain (07 Oct 2023 04:46):    Growth in aerobic bottle: Gram Positive Cocci in Clusters    Growth in anaerobic bottle: Gram Positive Cocci in Clusters  Final Report (08 Oct 2023 08:18):    Growth in aerobic and anaerobic bottles: Methicillin Resistant    Staphylococcus aureus    See previous culture [id number]            Culture - Blood (collected 10-09-23 @ 07:00)  Source: .Blood Blood  Gram Stain (10-10-23 @ 09:30):    Growth in aerobic bottle: Gram Positive Cocci in Clusters    Growth in anaerobic bottle: Gram Positive Cocci in Clusters  Preliminary Report (10-10-23 @ 23:18):    Growth in aerobic and anaerobic bottles: Staphylococcus aureus    Culture - Blood (collected 10-09-23 @ 07:00)  Source: .Blood Blood  Gram Stain (10-10-23 @ 06:14):    Growth in aerobic bottle: Gram Positive Cocci in Clusters    Growth in anaerobic bottle: Gram Positive Cocci in Clusters  Preliminary Report (10-10-23 @ 19:56):    Growth in aerobic and anaerobic bottles: Staphylococcus aureus    See previous culture [id number]    Culture - Blood (collected 10-06-23 @ 10:20)  Source: .Blood Blood-Peripheral  Gram Stain (10-07-23 @ 04:46):    Growth in aerobic bottle: Gram Positive Cocci in Clusters    Growth in anaerobic bottle: Gram Positive Cocci in Clusters  Final Report (10-08-23 @ 08:18):    Growth in aerobic and anaerobic bottles: Methicillin Resistant    Staphylococcus aureus    See previous culture [id number]        RADIOLOGY & ADDITIONAL TESTS: Personally reviewed.     Consultant(s) Notes Reviewed:  [x] YES  [ ] NO   Discussed with TRACEY/PEPPER, RN     Patient is a 78y old  Male who presents with a chief complaint of Sepsis (11 Oct 2023 10:29)      INTERVAL HPI/OVERNIGHT EVENTS: Patient seen and examined at bedside. No overnight events.    MEDICATIONS  (STANDING):  apixaban 5 milliGRAM(s) Oral two times a day  aspirin  chewable 81 milliGRAM(s) Oral daily  ceftaroline fosamil IVPB      ceftaroline fosamil IVPB 200 milliGRAM(s) IV Intermittent every 8 hours  chlorhexidine 2% Cloths 1 Application(s) Topical <User Schedule>  DAPTOmycin IVPB 600 milliGRAM(s) IV Intermittent every 48 hours  epoetin val (PROCRIT) Injectable 35827 Unit(s) IV Push <User Schedule>  folic acid 1 milliGRAM(s) Oral daily  hydrocortisone hemorrhoidal Suppository 1 Suppository(s) Rectal two times a day  influenza  Vaccine (HIGH DOSE) 0.7 milliLiter(s) IntraMuscular once  levothyroxine 150 MICROGram(s) Oral daily  mesalamine Suppository 1000 milliGRAM(s) Rectal at bedtime  midodrine 10 milliGRAM(s) Oral every 8 hours  pantoprazole   Suspension 40 milliGRAM(s) Enteral Tube daily  polyethylene glycol 3350 17 Gram(s) Oral daily  senna 2 Tablet(s) Oral at bedtime    MEDICATIONS  (PRN):  acetaminophen     Tablet .. 650 milliGRAM(s) Oral every 6 hours PRN Mild Pain (1 - 3)  aluminum hydroxide/magnesium hydroxide/simethicone Suspension 10 milliLiter(s) Oral every 6 hours PRN dyspepsia  sodium chloride 0.9% lock flush 10 milliLiter(s) IV Push every 1 hour PRN Pre/post blood products, medications, blood draw, and to maintain line patency      Allergies    levofloxacin (Unknown)  alfuzosin (Unknown)  tamsulosin (Unknown)  Myrbetriq (Unknown)    Intolerances      Vital Signs Last 24 Hrs  T(C): 36.5 (11 Oct 2023 05:07), Max: 36.9 (10 Oct 2023 12:24)  T(F): 97.7 (11 Oct 2023 05:07), Max: 98.4 (10 Oct 2023 12:24)  HR: 90 (11 Oct 2023 05:07) (87 - 99)  BP: 106/72 (11 Oct 2023 05:07) (103/68 - 114/59)  BP(mean): --  RR: 18 (11 Oct 2023 05:07) (18 - 18)  SpO2: 95% (11 Oct 2023 05:07) (94% - 98%)    Parameters below as of 11 Oct 2023 05:07  Patient On (Oxygen Delivery Method): room air      I&O's Summary    11 Oct 2023 07:01  -  11 Oct 2023 10:40  --------------------------------------------------------  IN: 50 mL / OUT: 0 mL / NET: 50 mL          PHYSICAL EXAM:  GENERAL: NAD  HEENT:  AT/NC, moist mucous membranes  CHEST/LUNG:  CTA b/l, no rales, wheezes, or rhonchi,  normal respiratory effort, no intercostal retractions  HEART:  irregular HR, S1, S2  VASCULAR: rt IJ shiley in place  ABDOMEN:  BS+, soft, nontender, nondistended  MSK/EXTREMITIES: 2+ peripheral pulses, b/l upper ext edema, nontender, edema rt foot  NERVOUS SYSTEM: awake with limited response to questions, lethargic      LABS: Personally reviewed  CBC                        7.7    16.65 )-----------( 126      ( 11 Oct 2023 05:26 )             23.1     CMP  10-11    134  |  98  |  66  ----------------------------<  99  4.5   |  28  |  4.68    Ca    9.3      11 Oct 2023 05:26  Phos  3.2     10-09  Mg     1.9     10-09    TPro  6.4  /  Alb  2.0  /  TBili  0.8  /  DBili  x   /  AST  14  /  ALT  15  /  AlkPhos  216  10-11                CARDIAC MARKERS ( 11 Oct 2023 05:26 )  x     / x     / <7 U/L / x     / x            TSH 31.921   TSH with FT4 reflex --  Total T3 --                  Urinalysis Basic - ( 11 Oct 2023 05:26 )    Color: x / Appearance: x / SG: x / pH: x  Gluc: 99 mg/dL / Ketone: x  / Bili: x / Urobili: x   Blood: x / Protein: x / Nitrite: x   Leuk Esterase: x / RBC: x / WBC x   Sq Epi: x / Non Sq Epi: x / Bacteria: x        Culture - Blood (collected 09 Oct 2023 07:00)  Source: .Blood Blood  Gram Stain (10 Oct 2023 09:30):    Growth in aerobic bottle: Gram Positive Cocci in Clusters    Growth in anaerobic bottle: Gram Positive Cocci in Clusters  Preliminary Report (10 Oct 2023 23:18):    Growth in aerobic and anaerobic bottles: Staphylococcus aureus    Culture - Blood (collected 09 Oct 2023 07:00)  Source: .Blood Blood  Gram Stain (10 Oct 2023 06:14):    Growth in aerobic bottle: Gram Positive Cocci in Clusters    Growth in anaerobic bottle: Gram Positive Cocci in Clusters  Preliminary Report (10 Oct 2023 19:56):    Growth in aerobic and anaerobic bottles: Staphylococcus aureus    See previous culture [id number]    Culture - Blood (collected 06 Oct 2023 10:20)  Source: .Blood Blood-Peripheral  Gram Stain (07 Oct 2023 04:46):    Growth in aerobic bottle: Gram Positive Cocci in Clusters    Growth in anaerobic bottle: Gram Positive Cocci in Clusters  Final Report (08 Oct 2023 08:18):    Growth in aerobic and anaerobic bottles: Methicillin Resistant    Staphylococcus aureus    See previous culture [id number]            Culture - Blood (collected 10-09-23 @ 07:00)  Source: .Blood Blood  Gram Stain (10-10-23 @ 09:30):    Growth in aerobic bottle: Gram Positive Cocci in Clusters    Growth in anaerobic bottle: Gram Positive Cocci in Clusters  Preliminary Report (10-10-23 @ 23:18):    Growth in aerobic and anaerobic bottles: Staphylococcus aureus    Culture - Blood (collected 10-09-23 @ 07:00)  Source: .Blood Blood  Gram Stain (10-10-23 @ 06:14):    Growth in aerobic bottle: Gram Positive Cocci in Clusters    Growth in anaerobic bottle: Gram Positive Cocci in Clusters  Preliminary Report (10-10-23 @ 19:56):    Growth in aerobic and anaerobic bottles: Staphylococcus aureus    See previous culture [id number]    Culture - Blood (collected 10-06-23 @ 10:20)  Source: .Blood Blood-Peripheral  Gram Stain (10-07-23 @ 04:46):    Growth in aerobic bottle: Gram Positive Cocci in Clusters    Growth in anaerobic bottle: Gram Positive Cocci in Clusters  Final Report (10-08-23 @ 08:18):    Growth in aerobic and anaerobic bottles: Methicillin Resistant    Staphylococcus aureus    See previous culture [id number]        RADIOLOGY & ADDITIONAL TESTS: Personally reviewed.     Consultant(s) Notes Reviewed:  [x] YES  [ ] NO   Discussed with TRACEY/PEPPER, RN     Patient is a 78y old  Male who presents with a chief complaint of Sepsis (11 Oct 2023 10:29)      INTERVAL HPI/OVERNIGHT EVENTS: Patient seen and examined at bedside. No overnight events.    MEDICATIONS  (STANDING):  apixaban 5 milliGRAM(s) Oral two times a day  aspirin  chewable 81 milliGRAM(s) Oral daily  ceftaroline fosamil IVPB      ceftaroline fosamil IVPB 200 milliGRAM(s) IV Intermittent every 8 hours  chlorhexidine 2% Cloths 1 Application(s) Topical <User Schedule>  DAPTOmycin IVPB 600 milliGRAM(s) IV Intermittent every 48 hours  epoetin val (PROCRIT) Injectable 70237 Unit(s) IV Push <User Schedule>  folic acid 1 milliGRAM(s) Oral daily  hydrocortisone hemorrhoidal Suppository 1 Suppository(s) Rectal two times a day  influenza  Vaccine (HIGH DOSE) 0.7 milliLiter(s) IntraMuscular once  levothyroxine 150 MICROGram(s) Oral daily  mesalamine Suppository 1000 milliGRAM(s) Rectal at bedtime  midodrine 10 milliGRAM(s) Oral every 8 hours  pantoprazole   Suspension 40 milliGRAM(s) Enteral Tube daily  polyethylene glycol 3350 17 Gram(s) Oral daily  senna 2 Tablet(s) Oral at bedtime    MEDICATIONS  (PRN):  acetaminophen     Tablet .. 650 milliGRAM(s) Oral every 6 hours PRN Mild Pain (1 - 3)  aluminum hydroxide/magnesium hydroxide/simethicone Suspension 10 milliLiter(s) Oral every 6 hours PRN dyspepsia  sodium chloride 0.9% lock flush 10 milliLiter(s) IV Push every 1 hour PRN Pre/post blood products, medications, blood draw, and to maintain line patency      Allergies    levofloxacin (Unknown)  alfuzosin (Unknown)  tamsulosin (Unknown)  Myrbetriq (Unknown)    Intolerances      Vital Signs Last 24 Hrs  T(C): 36.5 (11 Oct 2023 05:07), Max: 36.9 (10 Oct 2023 12:24)  T(F): 97.7 (11 Oct 2023 05:07), Max: 98.4 (10 Oct 2023 12:24)  HR: 90 (11 Oct 2023 05:07) (87 - 99)  BP: 106/72 (11 Oct 2023 05:07) (103/68 - 114/59)  BP(mean): --  RR: 18 (11 Oct 2023 05:07) (18 - 18)  SpO2: 95% (11 Oct 2023 05:07) (94% - 98%)    Parameters below as of 11 Oct 2023 05:07  Patient On (Oxygen Delivery Method): room air      I&O's Summary    11 Oct 2023 07:01  -  11 Oct 2023 10:40  --------------------------------------------------------  IN: 50 mL / OUT: 0 mL / NET: 50 mL          PHYSICAL EXAM:  GENERAL: NAD  HEENT:  AT/NC, moist mucous membranes  CHEST/LUNG:  CTA b/l, no rales, wheezes, or rhonchi,  normal respiratory effort, no intercostal retractions  HEART:  irregular HR, S1, S2  VASCULAR: rt IJ shiley in place  ABDOMEN:  BS+, soft, nontender, nondistended  MSK/EXTREMITIES: 2+ peripheral pulses, b/l upper ext edema, nontender, edema rt foot  NERVOUS SYSTEM: awake with limited response to questions, lethargic      LABS: Personally reviewed  CBC                        7.7    16.65 )-----------( 126      ( 11 Oct 2023 05:26 )             23.1     CMP  10-11    134  |  98  |  66  ----------------------------<  99  4.5   |  28  |  4.68    Ca    9.3      11 Oct 2023 05:26  Phos  3.2     10-09  Mg     1.9     10-09    TPro  6.4  /  Alb  2.0  /  TBili  0.8  /  DBili  x   /  AST  14  /  ALT  15  /  AlkPhos  216  10-11                CARDIAC MARKERS ( 11 Oct 2023 05:26 )  x     / x     / <7 U/L / x     / x            TSH 31.921   TSH with FT4 reflex --  Total T3 --                  Urinalysis Basic - ( 11 Oct 2023 05:26 )    Color: x / Appearance: x / SG: x / pH: x  Gluc: 99 mg/dL / Ketone: x  / Bili: x / Urobili: x   Blood: x / Protein: x / Nitrite: x   Leuk Esterase: x / RBC: x / WBC x   Sq Epi: x / Non Sq Epi: x / Bacteria: x        Culture - Blood (collected 09 Oct 2023 07:00)  Source: .Blood Blood  Gram Stain (10 Oct 2023 09:30):    Growth in aerobic bottle: Gram Positive Cocci in Clusters    Growth in anaerobic bottle: Gram Positive Cocci in Clusters  Preliminary Report (10 Oct 2023 23:18):    Growth in aerobic and anaerobic bottles: Staphylococcus aureus    Culture - Blood (collected 09 Oct 2023 07:00)  Source: .Blood Blood  Gram Stain (10 Oct 2023 06:14):    Growth in aerobic bottle: Gram Positive Cocci in Clusters    Growth in anaerobic bottle: Gram Positive Cocci in Clusters  Preliminary Report (10 Oct 2023 19:56):    Growth in aerobic and anaerobic bottles: Staphylococcus aureus    See previous culture [id number]    Culture - Blood (collected 06 Oct 2023 10:20)  Source: .Blood Blood-Peripheral  Gram Stain (07 Oct 2023 04:46):    Growth in aerobic bottle: Gram Positive Cocci in Clusters    Growth in anaerobic bottle: Gram Positive Cocci in Clusters  Final Report (08 Oct 2023 08:18):    Growth in aerobic and anaerobic bottles: Methicillin Resistant    Staphylococcus aureus    See previous culture [id number]            Culture - Blood (collected 10-09-23 @ 07:00)  Source: .Blood Blood  Gram Stain (10-10-23 @ 09:30):    Growth in aerobic bottle: Gram Positive Cocci in Clusters    Growth in anaerobic bottle: Gram Positive Cocci in Clusters  Preliminary Report (10-10-23 @ 23:18):    Growth in aerobic and anaerobic bottles: Staphylococcus aureus    Culture - Blood (collected 10-09-23 @ 07:00)  Source: .Blood Blood  Gram Stain (10-10-23 @ 06:14):    Growth in aerobic bottle: Gram Positive Cocci in Clusters    Growth in anaerobic bottle: Gram Positive Cocci in Clusters  Preliminary Report (10-10-23 @ 19:56):    Growth in aerobic and anaerobic bottles: Staphylococcus aureus    See previous culture [id number]    Culture - Blood (collected 10-06-23 @ 10:20)  Source: .Blood Blood-Peripheral  Gram Stain (10-07-23 @ 04:46):    Growth in aerobic bottle: Gram Positive Cocci in Clusters    Growth in anaerobic bottle: Gram Positive Cocci in Clusters  Final Report (10-08-23 @ 08:18):    Growth in aerobic and anaerobic bottles: Methicillin Resistant    Staphylococcus aureus    See previous culture [id number]        RADIOLOGY & ADDITIONAL TESTS: Personally reviewed.     Consultant(s) Notes Reviewed:  [x] YES  [ ] NO   Discussed with TRACEY/PEPPER, RN     Patient is a 78y old  Male who presents with a chief complaint of Sepsis (11 Oct 2023 10:29)      INTERVAL HPI/OVERNIGHT EVENTS: Patient seen and examined at bedside. No overnight events.    MEDICATIONS  (STANDING):  apixaban 5 milliGRAM(s) Oral two times a day  aspirin  chewable 81 milliGRAM(s) Oral daily  ceftaroline fosamil IVPB      ceftaroline fosamil IVPB 200 milliGRAM(s) IV Intermittent every 8 hours  chlorhexidine 2% Cloths 1 Application(s) Topical <User Schedule>  DAPTOmycin IVPB 600 milliGRAM(s) IV Intermittent every 48 hours  epoetin val (PROCRIT) Injectable 72198 Unit(s) IV Push <User Schedule>  folic acid 1 milliGRAM(s) Oral daily  hydrocortisone hemorrhoidal Suppository 1 Suppository(s) Rectal two times a day  influenza  Vaccine (HIGH DOSE) 0.7 milliLiter(s) IntraMuscular once  levothyroxine 150 MICROGram(s) Oral daily  mesalamine Suppository 1000 milliGRAM(s) Rectal at bedtime  midodrine 10 milliGRAM(s) Oral every 8 hours  pantoprazole   Suspension 40 milliGRAM(s) Enteral Tube daily  polyethylene glycol 3350 17 Gram(s) Oral daily  senna 2 Tablet(s) Oral at bedtime    MEDICATIONS  (PRN):  acetaminophen     Tablet .. 650 milliGRAM(s) Oral every 6 hours PRN Mild Pain (1 - 3)  aluminum hydroxide/magnesium hydroxide/simethicone Suspension 10 milliLiter(s) Oral every 6 hours PRN dyspepsia  sodium chloride 0.9% lock flush 10 milliLiter(s) IV Push every 1 hour PRN Pre/post blood products, medications, blood draw, and to maintain line patency      Allergies    levofloxacin (Unknown)  alfuzosin (Unknown)  tamsulosin (Unknown)  Myrbetriq (Unknown)    Intolerances      Vital Signs Last 24 Hrs  T(C): 36.5 (11 Oct 2023 05:07), Max: 36.9 (10 Oct 2023 12:24)  T(F): 97.7 (11 Oct 2023 05:07), Max: 98.4 (10 Oct 2023 12:24)  HR: 90 (11 Oct 2023 05:07) (87 - 99)  BP: 106/72 (11 Oct 2023 05:07) (103/68 - 114/59)  BP(mean): --  RR: 18 (11 Oct 2023 05:07) (18 - 18)  SpO2: 95% (11 Oct 2023 05:07) (94% - 98%)    Parameters below as of 11 Oct 2023 05:07  Patient On (Oxygen Delivery Method): room air      I&O's Summary    11 Oct 2023 07:01  -  11 Oct 2023 10:40  --------------------------------------------------------  IN: 50 mL / OUT: 0 mL / NET: 50 mL          PHYSICAL EXAM:  GENERAL: NAD  HEENT:  AT/NC, moist mucous membranes  CHEST/LUNG:  CTA b/l, no rales, wheezes, or rhonchi,  normal respiratory effort, no intercostal retractions  HEART:  irregular HR, S1, S2  VASCULAR: rt IJ shiley in place  ABDOMEN:  BS+, soft, nontender, nondistended  MSK/EXTREMITIES: 2+ peripheral pulses, b/l upper ext edema, nontender, edema rt foot  NERVOUS SYSTEM: awake with limited response to questions, lethargic      LABS: Personally reviewed  CBC                        7.7    16.65 )-----------( 126      ( 11 Oct 2023 05:26 )             23.1     CMP  10-11    134  |  98  |  66  ----------------------------<  99  4.5   |  28  |  4.68    Ca    9.3      11 Oct 2023 05:26  Phos  3.2     10-09  Mg     1.9     10-09    TPro  6.4  /  Alb  2.0  /  TBili  0.8  /  DBili  x   /  AST  14  /  ALT  15  /  AlkPhos  216  10-11                CARDIAC MARKERS ( 11 Oct 2023 05:26 )  x     / x     / <7 U/L / x     / x            TSH 31.921   TSH with FT4 reflex --  Total T3 --                  Urinalysis Basic - ( 11 Oct 2023 05:26 )    Color: x / Appearance: x / SG: x / pH: x  Gluc: 99 mg/dL / Ketone: x  / Bili: x / Urobili: x   Blood: x / Protein: x / Nitrite: x   Leuk Esterase: x / RBC: x / WBC x   Sq Epi: x / Non Sq Epi: x / Bacteria: x        Culture - Blood (collected 09 Oct 2023 07:00)  Source: .Blood Blood  Gram Stain (10 Oct 2023 09:30):    Growth in aerobic bottle: Gram Positive Cocci in Clusters    Growth in anaerobic bottle: Gram Positive Cocci in Clusters  Preliminary Report (10 Oct 2023 23:18):    Growth in aerobic and anaerobic bottles: Staphylococcus aureus    Culture - Blood (collected 09 Oct 2023 07:00)  Source: .Blood Blood  Gram Stain (10 Oct 2023 06:14):    Growth in aerobic bottle: Gram Positive Cocci in Clusters    Growth in anaerobic bottle: Gram Positive Cocci in Clusters  Preliminary Report (10 Oct 2023 19:56):    Growth in aerobic and anaerobic bottles: Staphylococcus aureus    See previous culture [id number]    Culture - Blood (collected 06 Oct 2023 10:20)  Source: .Blood Blood-Peripheral  Gram Stain (07 Oct 2023 04:46):    Growth in aerobic bottle: Gram Positive Cocci in Clusters    Growth in anaerobic bottle: Gram Positive Cocci in Clusters  Final Report (08 Oct 2023 08:18):    Growth in aerobic and anaerobic bottles: Methicillin Resistant    Staphylococcus aureus    See previous culture [id number]            Culture - Blood (collected 10-09-23 @ 07:00)  Source: .Blood Blood  Gram Stain (10-10-23 @ 09:30):    Growth in aerobic bottle: Gram Positive Cocci in Clusters    Growth in anaerobic bottle: Gram Positive Cocci in Clusters  Preliminary Report (10-10-23 @ 23:18):    Growth in aerobic and anaerobic bottles: Staphylococcus aureus    Culture - Blood (collected 10-09-23 @ 07:00)  Source: .Blood Blood  Gram Stain (10-10-23 @ 06:14):    Growth in aerobic bottle: Gram Positive Cocci in Clusters    Growth in anaerobic bottle: Gram Positive Cocci in Clusters  Preliminary Report (10-10-23 @ 19:56):    Growth in aerobic and anaerobic bottles: Staphylococcus aureus    See previous culture [id number]    Culture - Blood (collected 10-06-23 @ 10:20)  Source: .Blood Blood-Peripheral  Gram Stain (10-07-23 @ 04:46):    Growth in aerobic bottle: Gram Positive Cocci in Clusters    Growth in anaerobic bottle: Gram Positive Cocci in Clusters  Final Report (10-08-23 @ 08:18):    Growth in aerobic and anaerobic bottles: Methicillin Resistant    Staphylococcus aureus    See previous culture [id number]        RADIOLOGY & ADDITIONAL TESTS: Personally reviewed.   < from: CT Abdomen and Pelvis No Cont (10.11.23 @ 10:08) >  FINDINGS:  CHEST:  LUNGS AND LARGE AIRWAYS: Patent central airways. Bilateral lower lobe   atelectatic changes; underlying parenchymal infiltrate/consolidation   cannot be excluded. Interlobular septal thickening identified, raising   suspicion for an element of interstitial edema.  PLEURA: Moderate to large right pleural effusion. Small to moderate left   pleural effusion. No evidence for pneumothorax.  VESSELS: Coronary arterial calcifications. Atherosclerotic aortic   calcifications.  HEART: Cardiomegaly. No pericardial effusion. Micra pacemaker. 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  MEDIASTINUM AND VILLA: No lymphadenopathy.  CHEST WALL AND LOWER NECK: Within normal limits.    ABDOMEN AND PELVIS:  LIVER: The liver is normal in size. No focal hepatic masses are   identified.  BILE DUCTS: Normal caliber.  GALLBLADDER: Large gallstone measuring 2.8 cm. No definite gallbladder   wall thickening.  SPLEEN: Within normal limits.  PANCREAS: Within normal limits.  ADRENALS: Within normal limits.  KIDNEYS/URETERS: A horseshoe kidney, with bilateral renal calculi   measuring up to 10 mm. No hydronephrosis. No discrete space-occupying   lesions of the renal parenchyma noted.    BLADDER: Poorly distended; element of bladder wall thickening cannot be   excluded. Mild stranding of the perivesical fat noted; correlate for   cystitis.  REPRODUCTIVE ORGANS: Foci of prostatic calcification noted.    BOWEL: Fecal material scattered throughout the colon. No evidence for   mechanical bowel obstruction. No colonic wall thickening. Appendix is   normal.  PERITONEUM: Small volume intra-abdominal ascites. No free intraperitoneal   air.  VESSELS: Atherosclerotic changes.  RETROPERITONEUM/LYMPH NODES: No lymphadenopathy.  ABDOMINAL WALL: Fat containing bilateral inguinal hernias, right greater   than left. Small fat-containing umbilical hernia. There is diffuse   subcutaneous edema consistent with anasarca.  BONES: Left femoral neck pinning. Status post sternotomy. Degenerative   changes.    IMPRESSION:  1. Bilateral pleural effusions, right greater left. Bilateral lower lobe   atelectatic changes; underlying parenchymal infiltrate/consolidation   cannot be excluded. Interlobular septal thickening.  2. 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   Mild stranding of the perivesical fat, with suspected bladder wall   thickening. Correlate for cystitis.    < end of copied text >      Consultant(s) Notes Reviewed:  [x] YES  [ ] NO   Discussed with TRACEY/PEPPER, RN     Patient is a 78y old  Male who presents with a chief complaint of Sepsis (11 Oct 2023 10:29)      INTERVAL HPI/OVERNIGHT EVENTS: Patient seen and examined at bedside. No overnight events.    MEDICATIONS  (STANDING):  apixaban 5 milliGRAM(s) Oral two times a day  aspirin  chewable 81 milliGRAM(s) Oral daily  ceftaroline fosamil IVPB      ceftaroline fosamil IVPB 200 milliGRAM(s) IV Intermittent every 8 hours  chlorhexidine 2% Cloths 1 Application(s) Topical <User Schedule>  DAPTOmycin IVPB 600 milliGRAM(s) IV Intermittent every 48 hours  epoetin val (PROCRIT) Injectable 83992 Unit(s) IV Push <User Schedule>  folic acid 1 milliGRAM(s) Oral daily  hydrocortisone hemorrhoidal Suppository 1 Suppository(s) Rectal two times a day  influenza  Vaccine (HIGH DOSE) 0.7 milliLiter(s) IntraMuscular once  levothyroxine 150 MICROGram(s) Oral daily  mesalamine Suppository 1000 milliGRAM(s) Rectal at bedtime  midodrine 10 milliGRAM(s) Oral every 8 hours  pantoprazole   Suspension 40 milliGRAM(s) Enteral Tube daily  polyethylene glycol 3350 17 Gram(s) Oral daily  senna 2 Tablet(s) Oral at bedtime    MEDICATIONS  (PRN):  acetaminophen     Tablet .. 650 milliGRAM(s) Oral every 6 hours PRN Mild Pain (1 - 3)  aluminum hydroxide/magnesium hydroxide/simethicone Suspension 10 milliLiter(s) Oral every 6 hours PRN dyspepsia  sodium chloride 0.9% lock flush 10 milliLiter(s) IV Push every 1 hour PRN Pre/post blood products, medications, blood draw, and to maintain line patency      Allergies    levofloxacin (Unknown)  alfuzosin (Unknown)  tamsulosin (Unknown)  Myrbetriq (Unknown)    Intolerances      Vital Signs Last 24 Hrs  T(C): 36.5 (11 Oct 2023 05:07), Max: 36.9 (10 Oct 2023 12:24)  T(F): 97.7 (11 Oct 2023 05:07), Max: 98.4 (10 Oct 2023 12:24)  HR: 90 (11 Oct 2023 05:07) (87 - 99)  BP: 106/72 (11 Oct 2023 05:07) (103/68 - 114/59)  BP(mean): --  RR: 18 (11 Oct 2023 05:07) (18 - 18)  SpO2: 95% (11 Oct 2023 05:07) (94% - 98%)    Parameters below as of 11 Oct 2023 05:07  Patient On (Oxygen Delivery Method): room air      I&O's Summary    11 Oct 2023 07:01  -  11 Oct 2023 10:40  --------------------------------------------------------  IN: 50 mL / OUT: 0 mL / NET: 50 mL          PHYSICAL EXAM:  GENERAL: NAD  HEENT:  AT/NC, moist mucous membranes  CHEST/LUNG:  CTA b/l, no rales, wheezes, or rhonchi,  normal respiratory effort, no intercostal retractions  HEART:  irregular HR, S1, S2  VASCULAR: rt IJ shiley in place  ABDOMEN:  BS+, soft, nontender, nondistended  MSK/EXTREMITIES: 2+ peripheral pulses, b/l upper ext edema, nontender, edema rt foot  NERVOUS SYSTEM: awake with limited response to questions, lethargic      LABS: Personally reviewed  CBC                        7.7    16.65 )-----------( 126      ( 11 Oct 2023 05:26 )             23.1     CMP  10-11    134  |  98  |  66  ----------------------------<  99  4.5   |  28  |  4.68    Ca    9.3      11 Oct 2023 05:26  Phos  3.2     10-09  Mg     1.9     10-09    TPro  6.4  /  Alb  2.0  /  TBili  0.8  /  DBili  x   /  AST  14  /  ALT  15  /  AlkPhos  216  10-11                CARDIAC MARKERS ( 11 Oct 2023 05:26 )  x     / x     / <7 U/L / x     / x            TSH 31.921   TSH with FT4 reflex --  Total T3 --                  Urinalysis Basic - ( 11 Oct 2023 05:26 )    Color: x / Appearance: x / SG: x / pH: x  Gluc: 99 mg/dL / Ketone: x  / Bili: x / Urobili: x   Blood: x / Protein: x / Nitrite: x   Leuk Esterase: x / RBC: x / WBC x   Sq Epi: x / Non Sq Epi: x / Bacteria: x        Culture - Blood (collected 09 Oct 2023 07:00)  Source: .Blood Blood  Gram Stain (10 Oct 2023 09:30):    Growth in aerobic bottle: Gram Positive Cocci in Clusters    Growth in anaerobic bottle: Gram Positive Cocci in Clusters  Preliminary Report (10 Oct 2023 23:18):    Growth in aerobic and anaerobic bottles: Staphylococcus aureus    Culture - Blood (collected 09 Oct 2023 07:00)  Source: .Blood Blood  Gram Stain (10 Oct 2023 06:14):    Growth in aerobic bottle: Gram Positive Cocci in Clusters    Growth in anaerobic bottle: Gram Positive Cocci in Clusters  Preliminary Report (10 Oct 2023 19:56):    Growth in aerobic and anaerobic bottles: Staphylococcus aureus    See previous culture [id number]    Culture - Blood (collected 06 Oct 2023 10:20)  Source: .Blood Blood-Peripheral  Gram Stain (07 Oct 2023 04:46):    Growth in aerobic bottle: Gram Positive Cocci in Clusters    Growth in anaerobic bottle: Gram Positive Cocci in Clusters  Final Report (08 Oct 2023 08:18):    Growth in aerobic and anaerobic bottles: Methicillin Resistant    Staphylococcus aureus    See previous culture [id number]            Culture - Blood (collected 10-09-23 @ 07:00)  Source: .Blood Blood  Gram Stain (10-10-23 @ 09:30):    Growth in aerobic bottle: Gram Positive Cocci in Clusters    Growth in anaerobic bottle: Gram Positive Cocci in Clusters  Preliminary Report (10-10-23 @ 23:18):    Growth in aerobic and anaerobic bottles: Staphylococcus aureus    Culture - Blood (collected 10-09-23 @ 07:00)  Source: .Blood Blood  Gram Stain (10-10-23 @ 06:14):    Growth in aerobic bottle: Gram Positive Cocci in Clusters    Growth in anaerobic bottle: Gram Positive Cocci in Clusters  Preliminary Report (10-10-23 @ 19:56):    Growth in aerobic and anaerobic bottles: Staphylococcus aureus    See previous culture [id number]    Culture - Blood (collected 10-06-23 @ 10:20)  Source: .Blood Blood-Peripheral  Gram Stain (10-07-23 @ 04:46):    Growth in aerobic bottle: Gram Positive Cocci in Clusters    Growth in anaerobic bottle: Gram Positive Cocci in Clusters  Final Report (10-08-23 @ 08:18):    Growth in aerobic and anaerobic bottles: Methicillin Resistant    Staphylococcus aureus    See previous culture [id number]        RADIOLOGY & ADDITIONAL TESTS: Personally reviewed.   < from: CT Abdomen and Pelvis No Cont (10.11.23 @ 10:08) >  FINDINGS:  CHEST:  LUNGS AND LARGE AIRWAYS: Patent central airways. Bilateral lower lobe   atelectatic changes; underlying parenchymal infiltrate/consolidation   cannot be excluded. Interlobular septal thickening identified, raising   suspicion for an element of interstitial edema.  PLEURA: Moderate to large right pleural effusion. Small to moderate left   pleural effusion. No evidence for pneumothorax.  VESSELS: Coronary arterial calcifications. Atherosclerotic aortic   calcifications.  HEART: Cardiomegaly. No pericardial effusion. Micra pacemaker. 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  MEDIASTINUM AND VILLA: No lymphadenopathy.  CHEST WALL AND LOWER NECK: Within normal limits.    ABDOMEN AND PELVIS:  LIVER: The liver is normal in size. No focal hepatic masses are   identified.  BILE DUCTS: Normal caliber.  GALLBLADDER: Large gallstone measuring 2.8 cm. No definite gallbladder   wall thickening.  SPLEEN: Within normal limits.  PANCREAS: Within normal limits.  ADRENALS: Within normal limits.  KIDNEYS/URETERS: A horseshoe kidney, with bilateral renal calculi   measuring up to 10 mm. No hydronephrosis. No discrete space-occupying   lesions of the renal parenchyma noted.    BLADDER: Poorly distended; element of bladder wall thickening cannot be   excluded. Mild stranding of the perivesical fat noted; correlate for   cystitis.  REPRODUCTIVE ORGANS: Foci of prostatic calcification noted.    BOWEL: Fecal material scattered throughout the colon. No evidence for   mechanical bowel obstruction. No colonic wall thickening. Appendix is   normal.  PERITONEUM: Small volume intra-abdominal ascites. No free intraperitoneal   air.  VESSELS: Atherosclerotic changes.  RETROPERITONEUM/LYMPH NODES: No lymphadenopathy.  ABDOMINAL WALL: Fat containing bilateral inguinal hernias, right greater   than left. Small fat-containing umbilical hernia. There is diffuse   subcutaneous edema consistent with anasarca.  BONES: Left femoral neck pinning. Status post sternotomy. Degenerative   changes.    IMPRESSION:  1. Bilateral pleural effusions, right greater left. Bilateral lower lobe   atelectatic changes; underlying parenchymal infiltrate/consolidation   cannot be excluded. Interlobular septal thickening.  2. 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   Mild stranding of the perivesical fat, with suspected bladder wall   thickening. Correlate for cystitis.    < end of copied text >      Consultant(s) Notes Reviewed:  [x] YES  [ ] NO   Discussed with TRACEY/PEPPER, RN     Patient is a 78y old  Male who presents with a chief complaint of Sepsis (11 Oct 2023 10:29)      INTERVAL HPI/OVERNIGHT EVENTS: Patient seen and examined at bedside. No overnight events.    MEDICATIONS  (STANDING):  apixaban 5 milliGRAM(s) Oral two times a day  aspirin  chewable 81 milliGRAM(s) Oral daily  ceftaroline fosamil IVPB      ceftaroline fosamil IVPB 200 milliGRAM(s) IV Intermittent every 8 hours  chlorhexidine 2% Cloths 1 Application(s) Topical <User Schedule>  DAPTOmycin IVPB 600 milliGRAM(s) IV Intermittent every 48 hours  epoetin val (PROCRIT) Injectable 74740 Unit(s) IV Push <User Schedule>  folic acid 1 milliGRAM(s) Oral daily  hydrocortisone hemorrhoidal Suppository 1 Suppository(s) Rectal two times a day  influenza  Vaccine (HIGH DOSE) 0.7 milliLiter(s) IntraMuscular once  levothyroxine 150 MICROGram(s) Oral daily  mesalamine Suppository 1000 milliGRAM(s) Rectal at bedtime  midodrine 10 milliGRAM(s) Oral every 8 hours  pantoprazole   Suspension 40 milliGRAM(s) Enteral Tube daily  polyethylene glycol 3350 17 Gram(s) Oral daily  senna 2 Tablet(s) Oral at bedtime    MEDICATIONS  (PRN):  acetaminophen     Tablet .. 650 milliGRAM(s) Oral every 6 hours PRN Mild Pain (1 - 3)  aluminum hydroxide/magnesium hydroxide/simethicone Suspension 10 milliLiter(s) Oral every 6 hours PRN dyspepsia  sodium chloride 0.9% lock flush 10 milliLiter(s) IV Push every 1 hour PRN Pre/post blood products, medications, blood draw, and to maintain line patency      Allergies    levofloxacin (Unknown)  alfuzosin (Unknown)  tamsulosin (Unknown)  Myrbetriq (Unknown)    Intolerances      Vital Signs Last 24 Hrs  T(C): 36.5 (11 Oct 2023 05:07), Max: 36.9 (10 Oct 2023 12:24)  T(F): 97.7 (11 Oct 2023 05:07), Max: 98.4 (10 Oct 2023 12:24)  HR: 90 (11 Oct 2023 05:07) (87 - 99)  BP: 106/72 (11 Oct 2023 05:07) (103/68 - 114/59)  BP(mean): --  RR: 18 (11 Oct 2023 05:07) (18 - 18)  SpO2: 95% (11 Oct 2023 05:07) (94% - 98%)    Parameters below as of 11 Oct 2023 05:07  Patient On (Oxygen Delivery Method): room air      I&O's Summary    11 Oct 2023 07:01  -  11 Oct 2023 10:40  --------------------------------------------------------  IN: 50 mL / OUT: 0 mL / NET: 50 mL          PHYSICAL EXAM:  GENERAL: NAD  HEENT:  AT/NC, moist mucous membranes  CHEST/LUNG:  CTA b/l, no rales, wheezes, or rhonchi,  normal respiratory effort, no intercostal retractions  HEART:  irregular HR, S1, S2  VASCULAR: rt IJ shiley in place  ABDOMEN:  BS+, soft, nontender, nondistended  MSK/EXTREMITIES: 2+ peripheral pulses, b/l upper ext edema, nontender, edema rt foot  NERVOUS SYSTEM: awake with limited response to questions, lethargic      LABS: Personally reviewed  CBC                        7.7    16.65 )-----------( 126      ( 11 Oct 2023 05:26 )             23.1     CMP  10-11    134  |  98  |  66  ----------------------------<  99  4.5   |  28  |  4.68    Ca    9.3      11 Oct 2023 05:26  Phos  3.2     10-09  Mg     1.9     10-09    TPro  6.4  /  Alb  2.0  /  TBili  0.8  /  DBili  x   /  AST  14  /  ALT  15  /  AlkPhos  216  10-11                CARDIAC MARKERS ( 11 Oct 2023 05:26 )  x     / x     / <7 U/L / x     / x            TSH 31.921   TSH with FT4 reflex --  Total T3 --                  Urinalysis Basic - ( 11 Oct 2023 05:26 )    Color: x / Appearance: x / SG: x / pH: x  Gluc: 99 mg/dL / Ketone: x  / Bili: x / Urobili: x   Blood: x / Protein: x / Nitrite: x   Leuk Esterase: x / RBC: x / WBC x   Sq Epi: x / Non Sq Epi: x / Bacteria: x        Culture - Blood (collected 09 Oct 2023 07:00)  Source: .Blood Blood  Gram Stain (10 Oct 2023 09:30):    Growth in aerobic bottle: Gram Positive Cocci in Clusters    Growth in anaerobic bottle: Gram Positive Cocci in Clusters  Preliminary Report (10 Oct 2023 23:18):    Growth in aerobic and anaerobic bottles: Staphylococcus aureus    Culture - Blood (collected 09 Oct 2023 07:00)  Source: .Blood Blood  Gram Stain (10 Oct 2023 06:14):    Growth in aerobic bottle: Gram Positive Cocci in Clusters    Growth in anaerobic bottle: Gram Positive Cocci in Clusters  Preliminary Report (10 Oct 2023 19:56):    Growth in aerobic and anaerobic bottles: Staphylococcus aureus    See previous culture [id number]    Culture - Blood (collected 06 Oct 2023 10:20)  Source: .Blood Blood-Peripheral  Gram Stain (07 Oct 2023 04:46):    Growth in aerobic bottle: Gram Positive Cocci in Clusters    Growth in anaerobic bottle: Gram Positive Cocci in Clusters  Final Report (08 Oct 2023 08:18):    Growth in aerobic and anaerobic bottles: Methicillin Resistant    Staphylococcus aureus    See previous culture [id number]            Culture - Blood (collected 10-09-23 @ 07:00)  Source: .Blood Blood  Gram Stain (10-10-23 @ 09:30):    Growth in aerobic bottle: Gram Positive Cocci in Clusters    Growth in anaerobic bottle: Gram Positive Cocci in Clusters  Preliminary Report (10-10-23 @ 23:18):    Growth in aerobic and anaerobic bottles: Staphylococcus aureus    Culture - Blood (collected 10-09-23 @ 07:00)  Source: .Blood Blood  Gram Stain (10-10-23 @ 06:14):    Growth in aerobic bottle: Gram Positive Cocci in Clusters    Growth in anaerobic bottle: Gram Positive Cocci in Clusters  Preliminary Report (10-10-23 @ 19:56):    Growth in aerobic and anaerobic bottles: Staphylococcus aureus    See previous culture [id number]    Culture - Blood (collected 10-06-23 @ 10:20)  Source: .Blood Blood-Peripheral  Gram Stain (10-07-23 @ 04:46):    Growth in aerobic bottle: Gram Positive Cocci in Clusters    Growth in anaerobic bottle: Gram Positive Cocci in Clusters  Final Report (10-08-23 @ 08:18):    Growth in aerobic and anaerobic bottles: Methicillin Resistant    Staphylococcus aureus    See previous culture [id number]        RADIOLOGY & ADDITIONAL TESTS: Personally reviewed.   < from: CT Abdomen and Pelvis No Cont (10.11.23 @ 10:08) >  FINDINGS:  CHEST:  LUNGS AND LARGE AIRWAYS: Patent central airways. Bilateral lower lobe   atelectatic changes; underlying parenchymal infiltrate/consolidation   cannot be excluded. Interlobular septal thickening identified, raising   suspicion for an element of interstitial edema.  PLEURA: Moderate to large right pleural effusion. Small to moderate left   pleural effusion. No evidence for pneumothorax.  VESSELS: Coronary arterial calcifications. Atherosclerotic aortic   calcifications.  HEART: Cardiomegaly. No pericardial effusion. Micra pacemaker. 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  MEDIASTINUM AND VILLA: No lymphadenopathy.  CHEST WALL AND LOWER NECK: Within normal limits.    ABDOMEN AND PELVIS:  LIVER: The liver is normal in size. No focal hepatic masses are   identified.  BILE DUCTS: Normal caliber.  GALLBLADDER: Large gallstone measuring 2.8 cm. No definite gallbladder   wall thickening.  SPLEEN: Within normal limits.  PANCREAS: Within normal limits.  ADRENALS: Within normal limits.  KIDNEYS/URETERS: A horseshoe kidney, with bilateral renal calculi   measuring up to 10 mm. No hydronephrosis. No discrete space-occupying   lesions of the renal parenchyma noted.    BLADDER: Poorly distended; element of bladder wall thickening cannot be   excluded. Mild stranding of the perivesical fat noted; correlate for   cystitis.  REPRODUCTIVE ORGANS: Foci of prostatic calcification noted.    BOWEL: Fecal material scattered throughout the colon. No evidence for   mechanical bowel obstruction. No colonic wall thickening. Appendix is   normal.  PERITONEUM: Small volume intra-abdominal ascites. No free intraperitoneal   air.  VESSELS: Atherosclerotic changes.  RETROPERITONEUM/LYMPH NODES: No lymphadenopathy.  ABDOMINAL WALL: Fat containing bilateral inguinal hernias, right greater   than left. Small fat-containing umbilical hernia. There is diffuse   subcutaneous edema consistent with anasarca.  BONES: Left femoral neck pinning. Status post sternotomy. Degenerative   changes.    IMPRESSION:  1. Bilateral pleural effusions, right greater left. Bilateral lower lobe   atelectatic changes; underlying parenchymal infiltrate/consolidation   cannot be excluded. Interlobular septal thickening.  2. 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   Mild stranding of the perivesical fat, with suspected bladder wall   thickening. Correlate for cystitis.    < end of copied text >      Consultant(s) Notes Reviewed:  [x] YES  [ ] NO   Discussed with TRACEY/PEPPER, RN     Patient is a 78y old  Male who presents with a chief complaint of Sepsis (11 Oct 2023 10:29)    INTERVAL HPI/OVERNIGHT EVENTS: Patient seen and examined at bedside. No overnight events.    MEDICATIONS  (STANDING):  apixaban 5 milliGRAM(s) Oral two times a day  aspirin  chewable 81 milliGRAM(s) Oral daily  ceftaroline fosamil IVPB      ceftaroline fosamil IVPB 200 milliGRAM(s) IV Intermittent every 8 hours  chlorhexidine 2% Cloths 1 Application(s) Topical <User Schedule>  DAPTOmycin IVPB 600 milliGRAM(s) IV Intermittent every 48 hours  epoetin val (PROCRIT) Injectable 31014 Unit(s) IV Push <User Schedule>  folic acid 1 milliGRAM(s) Oral daily  hydrocortisone hemorrhoidal Suppository 1 Suppository(s) Rectal two times a day  influenza  Vaccine (HIGH DOSE) 0.7 milliLiter(s) IntraMuscular once  levothyroxine 150 MICROGram(s) Oral daily  mesalamine Suppository 1000 milliGRAM(s) Rectal at bedtime  midodrine 10 milliGRAM(s) Oral every 8 hours  pantoprazole   Suspension 40 milliGRAM(s) Enteral Tube daily  polyethylene glycol 3350 17 Gram(s) Oral daily  senna 2 Tablet(s) Oral at bedtime    MEDICATIONS  (PRN):  acetaminophen     Tablet .. 650 milliGRAM(s) Oral every 6 hours PRN Mild Pain (1 - 3)  aluminum hydroxide/magnesium hydroxide/simethicone Suspension 10 milliLiter(s) Oral every 6 hours PRN dyspepsia  sodium chloride 0.9% lock flush 10 milliLiter(s) IV Push every 1 hour PRN Pre/post blood products, medications, blood draw, and to maintain line patency      Allergies    levofloxacin (Unknown)  alfuzosin (Unknown)  tamsulosin (Unknown)  Myrbetriq (Unknown)    Intolerances      Vital Signs Last 24 Hrs  T(C): 36.5 (11 Oct 2023 05:07), Max: 36.9 (10 Oct 2023 12:24)  T(F): 97.7 (11 Oct 2023 05:07), Max: 98.4 (10 Oct 2023 12:24)  HR: 90 (11 Oct 2023 05:07) (87 - 99)  BP: 106/72 (11 Oct 2023 05:07) (103/68 - 114/59)  BP(mean): --  RR: 18 (11 Oct 2023 05:07) (18 - 18)  SpO2: 95% (11 Oct 2023 05:07) (94% - 98%)    Parameters below as of 11 Oct 2023 05:07  Patient On (Oxygen Delivery Method): room air      I&O's Summary    11 Oct 2023 07:01  -  11 Oct 2023 10:40  --------------------------------------------------------  IN: 50 mL / OUT: 0 mL / NET: 50 mL          PHYSICAL EXAM:  GENERAL: NAD  HEENT:  AT/NC, moist mucous membranes  CHEST/LUNG:  CTA b/l, no rales, wheezes, or rhonchi,  normal respiratory effort, no intercostal retractions  HEART:  irregular HR, S1, S2  VASCULAR: rt IJ shiley in place  ABDOMEN:  BS+, soft, nontender, nondistended  MSK/EXTREMITIES: 2+ peripheral pulses, b/l upper ext edema, nontender, edema rt foot  NERVOUS SYSTEM: awake with limited response to questions, lethargic      LABS: Personally reviewed  CBC                        7.7    16.65 )-----------( 126      ( 11 Oct 2023 05:26 )             23.1     CMP  10-11    134  |  98  |  66  ----------------------------<  99  4.5   |  28  |  4.68    Ca    9.3      11 Oct 2023 05:26  Phos  3.2     10-09  Mg     1.9     10-09    TPro  6.4  /  Alb  2.0  /  TBili  0.8  /  DBili  x   /  AST  14  /  ALT  15  /  AlkPhos  216  10-11                CARDIAC MARKERS ( 11 Oct 2023 05:26 )  x     / x     / <7 U/L / x     / x            TSH 31.921   TSH with FT4 reflex --  Total T3 --                  Urinalysis Basic - ( 11 Oct 2023 05:26 )    Color: x / Appearance: x / SG: x / pH: x  Gluc: 99 mg/dL / Ketone: x  / Bili: x / Urobili: x   Blood: x / Protein: x / Nitrite: x   Leuk Esterase: x / RBC: x / WBC x   Sq Epi: x / Non Sq Epi: x / Bacteria: x        Culture - Blood (collected 09 Oct 2023 07:00)  Source: .Blood Blood  Gram Stain (10 Oct 2023 09:30):    Growth in aerobic bottle: Gram Positive Cocci in Clusters    Growth in anaerobic bottle: Gram Positive Cocci in Clusters  Preliminary Report (10 Oct 2023 23:18):    Growth in aerobic and anaerobic bottles: Staphylococcus aureus    Culture - Blood (collected 09 Oct 2023 07:00)  Source: .Blood Blood  Gram Stain (10 Oct 2023 06:14):    Growth in aerobic bottle: Gram Positive Cocci in Clusters    Growth in anaerobic bottle: Gram Positive Cocci in Clusters  Preliminary Report (10 Oct 2023 19:56):    Growth in aerobic and anaerobic bottles: Staphylococcus aureus    See previous culture [id number]    Culture - Blood (collected 06 Oct 2023 10:20)  Source: .Blood Blood-Peripheral  Gram Stain (07 Oct 2023 04:46):    Growth in aerobic bottle: Gram Positive Cocci in Clusters    Growth in anaerobic bottle: Gram Positive Cocci in Clusters  Final Report (08 Oct 2023 08:18):    Growth in aerobic and anaerobic bottles: Methicillin Resistant    Staphylococcus aureus    See previous culture [id number]            Culture - Blood (collected 10-09-23 @ 07:00)  Source: .Blood Blood  Gram Stain (10-10-23 @ 09:30):    Growth in aerobic bottle: Gram Positive Cocci in Clusters    Growth in anaerobic bottle: Gram Positive Cocci in Clusters  Preliminary Report (10-10-23 @ 23:18):    Growth in aerobic and anaerobic bottles: Staphylococcus aureus    Culture - Blood (collected 10-09-23 @ 07:00)  Source: .Blood Blood  Gram Stain (10-10-23 @ 06:14):    Growth in aerobic bottle: Gram Positive Cocci in Clusters    Growth in anaerobic bottle: Gram Positive Cocci in Clusters  Preliminary Report (10-10-23 @ 19:56):    Growth in aerobic and anaerobic bottles: Staphylococcus aureus    See previous culture [id number]    Culture - Blood (collected 10-06-23 @ 10:20)  Source: .Blood Blood-Peripheral  Gram Stain (10-07-23 @ 04:46):    Growth in aerobic bottle: Gram Positive Cocci in Clusters    Growth in anaerobic bottle: Gram Positive Cocci in Clusters  Final Report (10-08-23 @ 08:18):    Growth in aerobic and anaerobic bottles: Methicillin Resistant    Staphylococcus aureus    See previous culture [id number]        RADIOLOGY & ADDITIONAL TESTS: Personally reviewed.   < from: CT Abdomen and Pelvis No Cont (10.11.23 @ 10:08) >  FINDINGS:  CHEST:  LUNGS AND LARGE AIRWAYS: Patent central airways. Bilateral lower lobe   atelectatic changes; underlying parenchymal infiltrate/consolidation   cannot be excluded. Interlobular septal thickening identified, raising   suspicion for an element of interstitial edema.  PLEURA: Moderate to large right pleural effusion. Small to moderate left   pleural effusion. No evidence for pneumothorax.  VESSELS: Coronary arterial calcifications. Atherosclerotic aortic   calcifications.  HEART: Cardiomegaly. No pericardial effusion. Micra pacemaker. 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  MEDIASTINUM AND VILLA: No lymphadenopathy.  CHEST WALL AND LOWER NECK: Within normal limits.    ABDOMEN AND PELVIS:  LIVER: The liver is normal in size. No focal hepatic masses are   identified.  BILE DUCTS: Normal caliber.  GALLBLADDER: Large gallstone measuring 2.8 cm. No definite gallbladder   wall thickening.  SPLEEN: Within normal limits.  PANCREAS: Within normal limits.  ADRENALS: Within normal limits.  KIDNEYS/URETERS: A horseshoe kidney, with bilateral renal calculi   measuring up to 10 mm. No hydronephrosis. No discrete space-occupying   lesions of the renal parenchyma noted.    BLADDER: Poorly distended; element of bladder wall thickening cannot be   excluded. Mild stranding of the perivesical fat noted; correlate for   cystitis.  REPRODUCTIVE ORGANS: Foci of prostatic calcification noted.    BOWEL: Fecal material scattered throughout the colon. No evidence for   mechanical bowel obstruction. No colonic wall thickening. Appendix is   normal.  PERITONEUM: Small volume intra-abdominal ascites. No free intraperitoneal   air.  VESSELS: Atherosclerotic changes.  RETROPERITONEUM/LYMPH NODES: No lymphadenopathy.  ABDOMINAL WALL: Fat containing bilateral inguinal hernias, right greater   than left. Small fat-containing umbilical hernia. There is diffuse   subcutaneous edema consistent with anasarca.  BONES: Left femoral neck pinning. Status post sternotomy. Degenerative   changes.    IMPRESSION:  1. Bilateral pleural effusions, right greater left. Bilateral lower lobe   atelectatic changes; underlying parenchymal infiltrate/consolidation   cannot be excluded. Interlobular septal thickening.  2. 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   Mild stranding of the perivesical fat, with suspected bladder wall   thickening. Correlate for cystitis.    < end of copied text >      Consultant(s) Notes Reviewed:  [x] YES  [ ] NO   Discussed with TRACEY/PEPPER, RN     Patient is a 78y old  Male who presents with a chief complaint of Sepsis (11 Oct 2023 10:29)    INTERVAL HPI/OVERNIGHT EVENTS: Patient seen and examined at bedside. No overnight events.    MEDICATIONS  (STANDING):  apixaban 5 milliGRAM(s) Oral two times a day  aspirin  chewable 81 milliGRAM(s) Oral daily  ceftaroline fosamil IVPB      ceftaroline fosamil IVPB 200 milliGRAM(s) IV Intermittent every 8 hours  chlorhexidine 2% Cloths 1 Application(s) Topical <User Schedule>  DAPTOmycin IVPB 600 milliGRAM(s) IV Intermittent every 48 hours  epoetin val (PROCRIT) Injectable 91189 Unit(s) IV Push <User Schedule>  folic acid 1 milliGRAM(s) Oral daily  hydrocortisone hemorrhoidal Suppository 1 Suppository(s) Rectal two times a day  influenza  Vaccine (HIGH DOSE) 0.7 milliLiter(s) IntraMuscular once  levothyroxine 150 MICROGram(s) Oral daily  mesalamine Suppository 1000 milliGRAM(s) Rectal at bedtime  midodrine 10 milliGRAM(s) Oral every 8 hours  pantoprazole   Suspension 40 milliGRAM(s) Enteral Tube daily  polyethylene glycol 3350 17 Gram(s) Oral daily  senna 2 Tablet(s) Oral at bedtime    MEDICATIONS  (PRN):  acetaminophen     Tablet .. 650 milliGRAM(s) Oral every 6 hours PRN Mild Pain (1 - 3)  aluminum hydroxide/magnesium hydroxide/simethicone Suspension 10 milliLiter(s) Oral every 6 hours PRN dyspepsia  sodium chloride 0.9% lock flush 10 milliLiter(s) IV Push every 1 hour PRN Pre/post blood products, medications, blood draw, and to maintain line patency      Allergies    levofloxacin (Unknown)  alfuzosin (Unknown)  tamsulosin (Unknown)  Myrbetriq (Unknown)    Intolerances      Vital Signs Last 24 Hrs  T(C): 36.5 (11 Oct 2023 05:07), Max: 36.9 (10 Oct 2023 12:24)  T(F): 97.7 (11 Oct 2023 05:07), Max: 98.4 (10 Oct 2023 12:24)  HR: 90 (11 Oct 2023 05:07) (87 - 99)  BP: 106/72 (11 Oct 2023 05:07) (103/68 - 114/59)  BP(mean): --  RR: 18 (11 Oct 2023 05:07) (18 - 18)  SpO2: 95% (11 Oct 2023 05:07) (94% - 98%)    Parameters below as of 11 Oct 2023 05:07  Patient On (Oxygen Delivery Method): room air      I&O's Summary    11 Oct 2023 07:01  -  11 Oct 2023 10:40  --------------------------------------------------------  IN: 50 mL / OUT: 0 mL / NET: 50 mL          PHYSICAL EXAM:  GENERAL: NAD  HEENT:  AT/NC, moist mucous membranes  CHEST/LUNG:  CTA b/l, no rales, wheezes, or rhonchi,  normal respiratory effort, no intercostal retractions  HEART:  irregular HR, S1, S2  VASCULAR: rt IJ shiley in place  ABDOMEN:  BS+, soft, nontender, nondistended  MSK/EXTREMITIES: 2+ peripheral pulses, b/l upper ext edema, nontender, edema rt foot  NERVOUS SYSTEM: awake with limited response to questions, lethargic      LABS: Personally reviewed  CBC                        7.7    16.65 )-----------( 126      ( 11 Oct 2023 05:26 )             23.1     CMP  10-11    134  |  98  |  66  ----------------------------<  99  4.5   |  28  |  4.68    Ca    9.3      11 Oct 2023 05:26  Phos  3.2     10-09  Mg     1.9     10-09    TPro  6.4  /  Alb  2.0  /  TBili  0.8  /  DBili  x   /  AST  14  /  ALT  15  /  AlkPhos  216  10-11                CARDIAC MARKERS ( 11 Oct 2023 05:26 )  x     / x     / <7 U/L / x     / x            TSH 31.921   TSH with FT4 reflex --  Total T3 --                  Urinalysis Basic - ( 11 Oct 2023 05:26 )    Color: x / Appearance: x / SG: x / pH: x  Gluc: 99 mg/dL / Ketone: x  / Bili: x / Urobili: x   Blood: x / Protein: x / Nitrite: x   Leuk Esterase: x / RBC: x / WBC x   Sq Epi: x / Non Sq Epi: x / Bacteria: x        Culture - Blood (collected 09 Oct 2023 07:00)  Source: .Blood Blood  Gram Stain (10 Oct 2023 09:30):    Growth in aerobic bottle: Gram Positive Cocci in Clusters    Growth in anaerobic bottle: Gram Positive Cocci in Clusters  Preliminary Report (10 Oct 2023 23:18):    Growth in aerobic and anaerobic bottles: Staphylococcus aureus    Culture - Blood (collected 09 Oct 2023 07:00)  Source: .Blood Blood  Gram Stain (10 Oct 2023 06:14):    Growth in aerobic bottle: Gram Positive Cocci in Clusters    Growth in anaerobic bottle: Gram Positive Cocci in Clusters  Preliminary Report (10 Oct 2023 19:56):    Growth in aerobic and anaerobic bottles: Staphylococcus aureus    See previous culture [id number]    Culture - Blood (collected 06 Oct 2023 10:20)  Source: .Blood Blood-Peripheral  Gram Stain (07 Oct 2023 04:46):    Growth in aerobic bottle: Gram Positive Cocci in Clusters    Growth in anaerobic bottle: Gram Positive Cocci in Clusters  Final Report (08 Oct 2023 08:18):    Growth in aerobic and anaerobic bottles: Methicillin Resistant    Staphylococcus aureus    See previous culture [id number]            Culture - Blood (collected 10-09-23 @ 07:00)  Source: .Blood Blood  Gram Stain (10-10-23 @ 09:30):    Growth in aerobic bottle: Gram Positive Cocci in Clusters    Growth in anaerobic bottle: Gram Positive Cocci in Clusters  Preliminary Report (10-10-23 @ 23:18):    Growth in aerobic and anaerobic bottles: Staphylococcus aureus    Culture - Blood (collected 10-09-23 @ 07:00)  Source: .Blood Blood  Gram Stain (10-10-23 @ 06:14):    Growth in aerobic bottle: Gram Positive Cocci in Clusters    Growth in anaerobic bottle: Gram Positive Cocci in Clusters  Preliminary Report (10-10-23 @ 19:56):    Growth in aerobic and anaerobic bottles: Staphylococcus aureus    See previous culture [id number]    Culture - Blood (collected 10-06-23 @ 10:20)  Source: .Blood Blood-Peripheral  Gram Stain (10-07-23 @ 04:46):    Growth in aerobic bottle: Gram Positive Cocci in Clusters    Growth in anaerobic bottle: Gram Positive Cocci in Clusters  Final Report (10-08-23 @ 08:18):    Growth in aerobic and anaerobic bottles: Methicillin Resistant    Staphylococcus aureus    See previous culture [id number]        RADIOLOGY & ADDITIONAL TESTS: Personally reviewed.   < from: CT Abdomen and Pelvis No Cont (10.11.23 @ 10:08) >  FINDINGS:  CHEST:  LUNGS AND LARGE AIRWAYS: Patent central airways. Bilateral lower lobe   atelectatic changes; underlying parenchymal infiltrate/consolidation   cannot be excluded. Interlobular septal thickening identified, raising   suspicion for an element of interstitial edema.  PLEURA: Moderate to large right pleural effusion. Small to moderate left   pleural effusion. No evidence for pneumothorax.  VESSELS: Coronary arterial calcifications. Atherosclerotic aortic   calcifications.  HEART: Cardiomegaly. No pericardial effusion. Micra pacemaker. 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  MEDIASTINUM AND VILLA: No lymphadenopathy.  CHEST WALL AND LOWER NECK: Within normal limits.    ABDOMEN AND PELVIS:  LIVER: The liver is normal in size. No focal hepatic masses are   identified.  BILE DUCTS: Normal caliber.  GALLBLADDER: Large gallstone measuring 2.8 cm. No definite gallbladder   wall thickening.  SPLEEN: Within normal limits.  PANCREAS: Within normal limits.  ADRENALS: Within normal limits.  KIDNEYS/URETERS: A horseshoe kidney, with bilateral renal calculi   measuring up to 10 mm. No hydronephrosis. No discrete space-occupying   lesions of the renal parenchyma noted.    BLADDER: Poorly distended; element of bladder wall thickening cannot be   excluded. Mild stranding of the perivesical fat noted; correlate for   cystitis.  REPRODUCTIVE ORGANS: Foci of prostatic calcification noted.    BOWEL: Fecal material scattered throughout the colon. No evidence for   mechanical bowel obstruction. No colonic wall thickening. Appendix is   normal.  PERITONEUM: Small volume intra-abdominal ascites. No free intraperitoneal   air.  VESSELS: Atherosclerotic changes.  RETROPERITONEUM/LYMPH NODES: No lymphadenopathy.  ABDOMINAL WALL: Fat containing bilateral inguinal hernias, right greater   than left. Small fat-containing umbilical hernia. There is diffuse   subcutaneous edema consistent with anasarca.  BONES: Left femoral neck pinning. Status post sternotomy. Degenerative   changes.    IMPRESSION:  1. Bilateral pleural effusions, right greater left. Bilateral lower lobe   atelectatic changes; underlying parenchymal infiltrate/consolidation   cannot be excluded. Interlobular septal thickening.  2. 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   Mild stranding of the perivesical fat, with suspected bladder wall   thickening. Correlate for cystitis.    < end of copied text >      Consultant(s) Notes Reviewed:  [x] YES  [ ] NO   Discussed with TRACEY/PEPPER, RN     Patient is a 78y old  Male who presents with a chief complaint of Sepsis (11 Oct 2023 10:29)    INTERVAL HPI/OVERNIGHT EVENTS: Patient seen and examined at bedside. No overnight events.    MEDICATIONS  (STANDING):  apixaban 5 milliGRAM(s) Oral two times a day  aspirin  chewable 81 milliGRAM(s) Oral daily  ceftaroline fosamil IVPB      ceftaroline fosamil IVPB 200 milliGRAM(s) IV Intermittent every 8 hours  chlorhexidine 2% Cloths 1 Application(s) Topical <User Schedule>  DAPTOmycin IVPB 600 milliGRAM(s) IV Intermittent every 48 hours  epoetin val (PROCRIT) Injectable 35324 Unit(s) IV Push <User Schedule>  folic acid 1 milliGRAM(s) Oral daily  hydrocortisone hemorrhoidal Suppository 1 Suppository(s) Rectal two times a day  influenza  Vaccine (HIGH DOSE) 0.7 milliLiter(s) IntraMuscular once  levothyroxine 150 MICROGram(s) Oral daily  mesalamine Suppository 1000 milliGRAM(s) Rectal at bedtime  midodrine 10 milliGRAM(s) Oral every 8 hours  pantoprazole   Suspension 40 milliGRAM(s) Enteral Tube daily  polyethylene glycol 3350 17 Gram(s) Oral daily  senna 2 Tablet(s) Oral at bedtime    MEDICATIONS  (PRN):  acetaminophen     Tablet .. 650 milliGRAM(s) Oral every 6 hours PRN Mild Pain (1 - 3)  aluminum hydroxide/magnesium hydroxide/simethicone Suspension 10 milliLiter(s) Oral every 6 hours PRN dyspepsia  sodium chloride 0.9% lock flush 10 milliLiter(s) IV Push every 1 hour PRN Pre/post blood products, medications, blood draw, and to maintain line patency      Allergies    levofloxacin (Unknown)  alfuzosin (Unknown)  tamsulosin (Unknown)  Myrbetriq (Unknown)    Intolerances      Vital Signs Last 24 Hrs  T(C): 36.5 (11 Oct 2023 05:07), Max: 36.9 (10 Oct 2023 12:24)  T(F): 97.7 (11 Oct 2023 05:07), Max: 98.4 (10 Oct 2023 12:24)  HR: 90 (11 Oct 2023 05:07) (87 - 99)  BP: 106/72 (11 Oct 2023 05:07) (103/68 - 114/59)  BP(mean): --  RR: 18 (11 Oct 2023 05:07) (18 - 18)  SpO2: 95% (11 Oct 2023 05:07) (94% - 98%)    Parameters below as of 11 Oct 2023 05:07  Patient On (Oxygen Delivery Method): room air      I&O's Summary    11 Oct 2023 07:01  -  11 Oct 2023 10:40  --------------------------------------------------------  IN: 50 mL / OUT: 0 mL / NET: 50 mL          PHYSICAL EXAM:  GENERAL: NAD  HEENT:  AT/NC, moist mucous membranes  CHEST/LUNG:  CTA b/l, no rales, wheezes, or rhonchi,  normal respiratory effort, no intercostal retractions  HEART:  irregular HR, S1, S2  VASCULAR: rt IJ shiley in place  ABDOMEN:  BS+, soft, nontender, nondistended  MSK/EXTREMITIES: 2+ peripheral pulses, b/l upper ext edema, nontender, edema rt foot  NERVOUS SYSTEM: awake with limited response to questions, lethargic      LABS: Personally reviewed  CBC                        7.7    16.65 )-----------( 126      ( 11 Oct 2023 05:26 )             23.1     CMP  10-11    134  |  98  |  66  ----------------------------<  99  4.5   |  28  |  4.68    Ca    9.3      11 Oct 2023 05:26  Phos  3.2     10-09  Mg     1.9     10-09    TPro  6.4  /  Alb  2.0  /  TBili  0.8  /  DBili  x   /  AST  14  /  ALT  15  /  AlkPhos  216  10-11                CARDIAC MARKERS ( 11 Oct 2023 05:26 )  x     / x     / <7 U/L / x     / x            TSH 31.921   TSH with FT4 reflex --  Total T3 --                  Urinalysis Basic - ( 11 Oct 2023 05:26 )    Color: x / Appearance: x / SG: x / pH: x  Gluc: 99 mg/dL / Ketone: x  / Bili: x / Urobili: x   Blood: x / Protein: x / Nitrite: x   Leuk Esterase: x / RBC: x / WBC x   Sq Epi: x / Non Sq Epi: x / Bacteria: x        Culture - Blood (collected 09 Oct 2023 07:00)  Source: .Blood Blood  Gram Stain (10 Oct 2023 09:30):    Growth in aerobic bottle: Gram Positive Cocci in Clusters    Growth in anaerobic bottle: Gram Positive Cocci in Clusters  Preliminary Report (10 Oct 2023 23:18):    Growth in aerobic and anaerobic bottles: Staphylococcus aureus    Culture - Blood (collected 09 Oct 2023 07:00)  Source: .Blood Blood  Gram Stain (10 Oct 2023 06:14):    Growth in aerobic bottle: Gram Positive Cocci in Clusters    Growth in anaerobic bottle: Gram Positive Cocci in Clusters  Preliminary Report (10 Oct 2023 19:56):    Growth in aerobic and anaerobic bottles: Staphylococcus aureus    See previous culture [id number]    Culture - Blood (collected 06 Oct 2023 10:20)  Source: .Blood Blood-Peripheral  Gram Stain (07 Oct 2023 04:46):    Growth in aerobic bottle: Gram Positive Cocci in Clusters    Growth in anaerobic bottle: Gram Positive Cocci in Clusters  Final Report (08 Oct 2023 08:18):    Growth in aerobic and anaerobic bottles: Methicillin Resistant    Staphylococcus aureus    See previous culture [id number]            Culture - Blood (collected 10-09-23 @ 07:00)  Source: .Blood Blood  Gram Stain (10-10-23 @ 09:30):    Growth in aerobic bottle: Gram Positive Cocci in Clusters    Growth in anaerobic bottle: Gram Positive Cocci in Clusters  Preliminary Report (10-10-23 @ 23:18):    Growth in aerobic and anaerobic bottles: Staphylococcus aureus    Culture - Blood (collected 10-09-23 @ 07:00)  Source: .Blood Blood  Gram Stain (10-10-23 @ 06:14):    Growth in aerobic bottle: Gram Positive Cocci in Clusters    Growth in anaerobic bottle: Gram Positive Cocci in Clusters  Preliminary Report (10-10-23 @ 19:56):    Growth in aerobic and anaerobic bottles: Staphylococcus aureus    See previous culture [id number]    Culture - Blood (collected 10-06-23 @ 10:20)  Source: .Blood Blood-Peripheral  Gram Stain (10-07-23 @ 04:46):    Growth in aerobic bottle: Gram Positive Cocci in Clusters    Growth in anaerobic bottle: Gram Positive Cocci in Clusters  Final Report (10-08-23 @ 08:18):    Growth in aerobic and anaerobic bottles: Methicillin Resistant    Staphylococcus aureus    See previous culture [id number]        RADIOLOGY & ADDITIONAL TESTS: Personally reviewed.   < from: CT Abdomen and Pelvis No Cont (10.11.23 @ 10:08) >  FINDINGS:  CHEST:  LUNGS AND LARGE AIRWAYS: Patent central airways. Bilateral lower lobe   atelectatic changes; underlying parenchymal infiltrate/consolidation   cannot be excluded. Interlobular septal thickening identified, raising   suspicion for an element of interstitial edema.  PLEURA: Moderate to large right pleural effusion. Small to moderate left   pleural effusion. No evidence for pneumothorax.  VESSELS: Coronary arterial calcifications. Atherosclerotic aortic   calcifications.  HEART: Cardiomegaly. No pericardial effusion. Micra pacemaker. 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  MEDIASTINUM AND VILLA: No lymphadenopathy.  CHEST WALL AND LOWER NECK: Within normal limits.    ABDOMEN AND PELVIS:  LIVER: The liver is normal in size. No focal hepatic masses are   identified.  BILE DUCTS: Normal caliber.  GALLBLADDER: Large gallstone measuring 2.8 cm. No definite gallbladder   wall thickening.  SPLEEN: Within normal limits.  PANCREAS: Within normal limits.  ADRENALS: Within normal limits.  KIDNEYS/URETERS: A horseshoe kidney, with bilateral renal calculi   measuring up to 10 mm. No hydronephrosis. No discrete space-occupying   lesions of the renal parenchyma noted.    BLADDER: Poorly distended; element of bladder wall thickening cannot be   excluded. Mild stranding of the perivesical fat noted; correlate for   cystitis.  REPRODUCTIVE ORGANS: Foci of prostatic calcification noted.    BOWEL: Fecal material scattered throughout the colon. No evidence for   mechanical bowel obstruction. No colonic wall thickening. Appendix is   normal.  PERITONEUM: Small volume intra-abdominal ascites. No free intraperitoneal   air.  VESSELS: Atherosclerotic changes.  RETROPERITONEUM/LYMPH NODES: No lymphadenopathy.  ABDOMINAL WALL: Fat containing bilateral inguinal hernias, right greater   than left. Small fat-containing umbilical hernia. There is diffuse   subcutaneous edema consistent with anasarca.  BONES: Left femoral neck pinning. Status post sternotomy. Degenerative   changes.    IMPRESSION:  1. Bilateral pleural effusions, right greater left. Bilateral lower lobe   atelectatic changes; underlying parenchymal infiltrate/consolidation   cannot be excluded. Interlobular septal thickening.  2. 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   Mild stranding of the perivesical fat, with suspected bladder wall   thickening. Correlate for cystitis.    < end of copied text >      Consultant(s) Notes Reviewed:  [x] YES  [ ] NO   Discussed with TRACEY/PEPPER, RN

## 2023-10-11 NOTE — PROGRESS NOTE ADULT - SUBJECTIVE AND OBJECTIVE BOX
Time of Visit:  Patient seen and examined. pat is lying in bed awake , comfortable     MEDICATIONS  (STANDING):  apixaban 5 milliGRAM(s) Oral two times a day  aspirin  chewable 81 milliGRAM(s) Oral daily  ceftaroline fosamil IVPB      ceftaroline fosamil IVPB 200 milliGRAM(s) IV Intermittent every 8 hours  chlorhexidine 2% Cloths 1 Application(s) Topical <User Schedule>  DAPTOmycin IVPB 600 milliGRAM(s) IV Intermittent every 48 hours  epoetin val (PROCRIT) Injectable 93421 Unit(s) IV Push <User Schedule>  folic acid 1 milliGRAM(s) Oral daily  hydrocortisone hemorrhoidal Suppository 1 Suppository(s) Rectal two times a day  influenza  Vaccine (HIGH DOSE) 0.7 milliLiter(s) IntraMuscular once  levothyroxine 150 MICROGram(s) Oral daily  mesalamine Suppository 1000 milliGRAM(s) Rectal at bedtime  midodrine 10 milliGRAM(s) Oral every 8 hours  pantoprazole   Suspension 40 milliGRAM(s) Enteral Tube daily  polyethylene glycol 3350 17 Gram(s) Oral daily  senna 2 Tablet(s) Oral at bedtime      MEDICATIONS  (PRN):  acetaminophen     Tablet .. 650 milliGRAM(s) Oral every 6 hours PRN Mild Pain (1 - 3)  aluminum hydroxide/magnesium hydroxide/simethicone Suspension 10 milliLiter(s) Oral every 6 hours PRN dyspepsia  sodium chloride 0.9% lock flush 10 milliLiter(s) IV Push every 1 hour PRN Pre/post blood products, medications, blood draw, and to maintain line patency       Medications up to date at time of exam.      PHYSICAL EXAMINATION:  Patient has no new complaints.  GENERAL: The patient is a well-developed, well-nourished, in no apparent distress.     Vital Signs Last 24 Hrs  T(C): 36.5 (11 Oct 2023 13:26), Max: 36.5 (11 Oct 2023 05:07)  T(F): 97.7 (11 Oct 2023 13:26), Max: 97.7 (11 Oct 2023 05:07)  HR: 78 (11 Oct 2023 13:26) (78 - 90)  BP: 99/46 (11 Oct 2023 13:26) (99/46 - 114/59)  BP(mean): --  RR: 18 (11 Oct 2023 13:26) (18 - 18)  SpO2: 95% (11 Oct 2023 13:26) (94% - 98%)    Parameters below as of 11 Oct 2023 13:26  Patient On (Oxygen Delivery Method): room air       (if applicable)    Chest Tube (if applicable)    HEENT: Head is normocephalic and atraumatic. Extraocular muscles are intact. Mucous membranes are moist.     NECK: Supple, no palpable adenopathy. R IJ dialysis cath     LUNGS: Clear to auscultation, no wheezing, rales, or rhonchi.    HEART: Regular rate and rhythm without murmur.    ABDOMEN: Soft, nontender, and nondistended.  No hepatosplenomegaly is noted.    : No painful voiding, no pelvic pain    EXTREMITIES: Without any cyanosis, clubbing, rash, lesions or edema.    NEUROLOGIC: Awake,     SKIN: Warm, dry, good turgor.      LABS:                        7.7    16.65 )-----------( 126      ( 11 Oct 2023 05:26 )             23.1     10-11    134<L>  |  98  |  66<H>  ----------------------------<  99  4.5   |  28  |  4.68<H>    Ca    9.3      11 Oct 2023 05:26    TPro  6.4  /  Alb  2.0<L>  /  TBili  0.8  /  DBili  x   /  AST  14  /  ALT  15  /  AlkPhos  216<H>  10-11      Urinalysis Basic - ( 11 Oct 2023 05:26 )    Color: x / Appearance: x / SG: x / pH: x  Gluc: 99 mg/dL / Ketone: x  / Bili: x / Urobili: x   Blood: x / Protein: x / Nitrite: x   Leuk Esterase: x / RBC: x / WBC x   Sq Epi: x / Non Sq Epi: x / Bacteria: x        CARDIAC MARKERS ( 11 Oct 2023 05:26 )  x     / x     / <7 U/L / x     / x              MICROBIOLOGY: (if applicable)    RADIOLOGY & ADDITIONAL STUDIES:  EKG:   CXR:  ECHO:    IMPRESSION: 78y Male PAST MEDICAL & SURGICAL HISTORY:  Chronic atrial fibrillation      History of BPH      CAD (coronary artery disease)      Coronary stent patent      ESRD on dialysis      History of GI bleed      Mitral valve replaced       p/w       IMP: Assessment  - Septic shock, source suspected cardiac valves   - MRSA bacteremia   - AMS- metabolic encephelopathy  - Thrombocytopenia, suspect from Sepsis  - Underlying h/o CAD s/p PCI, BPH, ESRD on HD, s/p MVR, Afib    Plan:  - No sedation   - Antibx changed as per ID Dr lagunas  - Persistent MRSA bacteremia despite central line changed .   - ERENDIRA for valve evaluation for vegetation   - Pat may not be a surgical candidate for cardiac valve corrective surgery , if ERENDIRA is positive for vegetation then will need to address goal of care and duration of iv antibx   - Palliative eval noted   - Consider US of kidneys for possible stones   - No CT with contrast due to BEULAH on CKD   - Cont. midodrine for now, taper as tolerated but patient on it chronically pre HD  antibiotics as per ID,         Time of Visit:  Patient seen and examined. pat is lying in bed awake , comfortable     MEDICATIONS  (STANDING):  apixaban 5 milliGRAM(s) Oral two times a day  aspirin  chewable 81 milliGRAM(s) Oral daily  ceftaroline fosamil IVPB      ceftaroline fosamil IVPB 200 milliGRAM(s) IV Intermittent every 8 hours  chlorhexidine 2% Cloths 1 Application(s) Topical <User Schedule>  DAPTOmycin IVPB 600 milliGRAM(s) IV Intermittent every 48 hours  epoetin val (PROCRIT) Injectable 01755 Unit(s) IV Push <User Schedule>  folic acid 1 milliGRAM(s) Oral daily  hydrocortisone hemorrhoidal Suppository 1 Suppository(s) Rectal two times a day  influenza  Vaccine (HIGH DOSE) 0.7 milliLiter(s) IntraMuscular once  levothyroxine 150 MICROGram(s) Oral daily  mesalamine Suppository 1000 milliGRAM(s) Rectal at bedtime  midodrine 10 milliGRAM(s) Oral every 8 hours  pantoprazole   Suspension 40 milliGRAM(s) Enteral Tube daily  polyethylene glycol 3350 17 Gram(s) Oral daily  senna 2 Tablet(s) Oral at bedtime      MEDICATIONS  (PRN):  acetaminophen     Tablet .. 650 milliGRAM(s) Oral every 6 hours PRN Mild Pain (1 - 3)  aluminum hydroxide/magnesium hydroxide/simethicone Suspension 10 milliLiter(s) Oral every 6 hours PRN dyspepsia  sodium chloride 0.9% lock flush 10 milliLiter(s) IV Push every 1 hour PRN Pre/post blood products, medications, blood draw, and to maintain line patency       Medications up to date at time of exam.      PHYSICAL EXAMINATION:  Patient has no new complaints.  GENERAL: The patient is a well-developed, well-nourished, in no apparent distress.     Vital Signs Last 24 Hrs  T(C): 36.5 (11 Oct 2023 13:26), Max: 36.5 (11 Oct 2023 05:07)  T(F): 97.7 (11 Oct 2023 13:26), Max: 97.7 (11 Oct 2023 05:07)  HR: 78 (11 Oct 2023 13:26) (78 - 90)  BP: 99/46 (11 Oct 2023 13:26) (99/46 - 114/59)  BP(mean): --  RR: 18 (11 Oct 2023 13:26) (18 - 18)  SpO2: 95% (11 Oct 2023 13:26) (94% - 98%)    Parameters below as of 11 Oct 2023 13:26  Patient On (Oxygen Delivery Method): room air       (if applicable)    Chest Tube (if applicable)    HEENT: Head is normocephalic and atraumatic. Extraocular muscles are intact. Mucous membranes are moist.     NECK: Supple, no palpable adenopathy. R IJ dialysis cath     LUNGS: Clear to auscultation, no wheezing, rales, or rhonchi.    HEART: Regular rate and rhythm without murmur.    ABDOMEN: Soft, nontender, and nondistended.  No hepatosplenomegaly is noted.    : No painful voiding, no pelvic pain    EXTREMITIES: Without any cyanosis, clubbing, rash, lesions or edema.    NEUROLOGIC: Awake,     SKIN: Warm, dry, good turgor.      LABS:                        7.7    16.65 )-----------( 126      ( 11 Oct 2023 05:26 )             23.1     10-11    134<L>  |  98  |  66<H>  ----------------------------<  99  4.5   |  28  |  4.68<H>    Ca    9.3      11 Oct 2023 05:26    TPro  6.4  /  Alb  2.0<L>  /  TBili  0.8  /  DBili  x   /  AST  14  /  ALT  15  /  AlkPhos  216<H>  10-11      Urinalysis Basic - ( 11 Oct 2023 05:26 )    Color: x / Appearance: x / SG: x / pH: x  Gluc: 99 mg/dL / Ketone: x  / Bili: x / Urobili: x   Blood: x / Protein: x / Nitrite: x   Leuk Esterase: x / RBC: x / WBC x   Sq Epi: x / Non Sq Epi: x / Bacteria: x        CARDIAC MARKERS ( 11 Oct 2023 05:26 )  x     / x     / <7 U/L / x     / x              MICROBIOLOGY: (if applicable)    RADIOLOGY & ADDITIONAL STUDIES:  EKG:   CXR:  ECHO:    IMPRESSION: 78y Male PAST MEDICAL & SURGICAL HISTORY:  Chronic atrial fibrillation      History of BPH      CAD (coronary artery disease)      Coronary stent patent      ESRD on dialysis      History of GI bleed      Mitral valve replaced       p/w       IMP: Assessment  - Septic shock, source suspected cardiac valves   - MRSA bacteremia   - AMS- metabolic encephelopathy  - Thrombocytopenia, suspect from Sepsis  - Underlying h/o CAD s/p PCI, BPH, ESRD on HD, s/p MVR, Afib    Plan:  - No sedation   - Antibx changed as per ID Dr lagunas  - Persistent MRSA bacteremia despite central line changed .   - ERENDIRA for valve evaluation for vegetation   - Pat may not be a surgical candidate for cardiac valve corrective surgery , if ERENDIRA is positive for vegetation then will need to address goal of care and duration of iv antibx   - Palliative eval noted   - Consider US of kidneys for possible stones   - No CT with contrast due to BEULAH on CKD   - Cont. midodrine for now, taper as tolerated but patient on it chronically pre HD  antibiotics as per ID,         Time of Visit:  Patient seen and examined. pat is lying in bed awake , comfortable     MEDICATIONS  (STANDING):  apixaban 5 milliGRAM(s) Oral two times a day  aspirin  chewable 81 milliGRAM(s) Oral daily  ceftaroline fosamil IVPB      ceftaroline fosamil IVPB 200 milliGRAM(s) IV Intermittent every 8 hours  chlorhexidine 2% Cloths 1 Application(s) Topical <User Schedule>  DAPTOmycin IVPB 600 milliGRAM(s) IV Intermittent every 48 hours  epoetin val (PROCRIT) Injectable 69499 Unit(s) IV Push <User Schedule>  folic acid 1 milliGRAM(s) Oral daily  hydrocortisone hemorrhoidal Suppository 1 Suppository(s) Rectal two times a day  influenza  Vaccine (HIGH DOSE) 0.7 milliLiter(s) IntraMuscular once  levothyroxine 150 MICROGram(s) Oral daily  mesalamine Suppository 1000 milliGRAM(s) Rectal at bedtime  midodrine 10 milliGRAM(s) Oral every 8 hours  pantoprazole   Suspension 40 milliGRAM(s) Enteral Tube daily  polyethylene glycol 3350 17 Gram(s) Oral daily  senna 2 Tablet(s) Oral at bedtime      MEDICATIONS  (PRN):  acetaminophen     Tablet .. 650 milliGRAM(s) Oral every 6 hours PRN Mild Pain (1 - 3)  aluminum hydroxide/magnesium hydroxide/simethicone Suspension 10 milliLiter(s) Oral every 6 hours PRN dyspepsia  sodium chloride 0.9% lock flush 10 milliLiter(s) IV Push every 1 hour PRN Pre/post blood products, medications, blood draw, and to maintain line patency       Medications up to date at time of exam.      PHYSICAL EXAMINATION:  Patient has no new complaints.  GENERAL: The patient is a well-developed, well-nourished, in no apparent distress.     Vital Signs Last 24 Hrs  T(C): 36.5 (11 Oct 2023 13:26), Max: 36.5 (11 Oct 2023 05:07)  T(F): 97.7 (11 Oct 2023 13:26), Max: 97.7 (11 Oct 2023 05:07)  HR: 78 (11 Oct 2023 13:26) (78 - 90)  BP: 99/46 (11 Oct 2023 13:26) (99/46 - 114/59)  BP(mean): --  RR: 18 (11 Oct 2023 13:26) (18 - 18)  SpO2: 95% (11 Oct 2023 13:26) (94% - 98%)    Parameters below as of 11 Oct 2023 13:26  Patient On (Oxygen Delivery Method): room air       (if applicable)    Chest Tube (if applicable)    HEENT: Head is normocephalic and atraumatic. Extraocular muscles are intact. Mucous membranes are moist.     NECK: Supple, no palpable adenopathy. R IJ dialysis cath     LUNGS: Clear to auscultation, no wheezing, rales, or rhonchi.    HEART: Regular rate and rhythm without murmur.    ABDOMEN: Soft, nontender, and nondistended.  No hepatosplenomegaly is noted.    : No painful voiding, no pelvic pain    EXTREMITIES: Without any cyanosis, clubbing, rash, lesions or edema.    NEUROLOGIC: Awake,     SKIN: Warm, dry, good turgor.      LABS:                        7.7    16.65 )-----------( 126      ( 11 Oct 2023 05:26 )             23.1     10-11    134<L>  |  98  |  66<H>  ----------------------------<  99  4.5   |  28  |  4.68<H>    Ca    9.3      11 Oct 2023 05:26    TPro  6.4  /  Alb  2.0<L>  /  TBili  0.8  /  DBili  x   /  AST  14  /  ALT  15  /  AlkPhos  216<H>  10-11      Urinalysis Basic - ( 11 Oct 2023 05:26 )    Color: x / Appearance: x / SG: x / pH: x  Gluc: 99 mg/dL / Ketone: x  / Bili: x / Urobili: x   Blood: x / Protein: x / Nitrite: x   Leuk Esterase: x / RBC: x / WBC x   Sq Epi: x / Non Sq Epi: x / Bacteria: x        CARDIAC MARKERS ( 11 Oct 2023 05:26 )  x     / x     / <7 U/L / x     / x              MICROBIOLOGY: (if applicable)    RADIOLOGY & ADDITIONAL STUDIES:  EKG:   CXR:  ECHO:    IMPRESSION: 78y Male PAST MEDICAL & SURGICAL HISTORY:  Chronic atrial fibrillation      History of BPH      CAD (coronary artery disease)      Coronary stent patent      ESRD on dialysis      History of GI bleed      Mitral valve replaced       p/w       IMP: Assessment  - Septic shock, source suspected cardiac valves   - MRSA bacteremia   - AMS- metabolic encephelopathy  - Thrombocytopenia, suspect from Sepsis  - Underlying h/o CAD s/p PCI, BPH, ESRD on HD, s/p MVR, Afib    Plan:  - No sedation   - Antibx changed as per ID Dr lagunas  - Persistent MRSA bacteremia despite central line changed .   - ERENDIRA for valve evaluation for vegetation   - Pat may not be a surgical candidate for cardiac valve corrective surgery , if ERENDIRA is positive for vegetation then will need to address goal of care and duration of iv antibx   - Palliative eval noted   - Consider US of kidneys for possible stones   - No CT with contrast due to BEULAH on CKD   - Cont. midodrine for now, taper as tolerated but patient on it chronically pre HD  antibiotics as per ID,         Time of Visit:  Patient seen and examined. pat is lying in bed awake , comfortable     MEDICATIONS  (STANDING):  apixaban 5 milliGRAM(s) Oral two times a day  aspirin  chewable 81 milliGRAM(s) Oral daily  ceftaroline fosamil IVPB      ceftaroline fosamil IVPB 200 milliGRAM(s) IV Intermittent every 8 hours  chlorhexidine 2% Cloths 1 Application(s) Topical <User Schedule>  DAPTOmycin IVPB 600 milliGRAM(s) IV Intermittent every 48 hours  epoetin val (PROCRIT) Injectable 65182 Unit(s) IV Push <User Schedule>  folic acid 1 milliGRAM(s) Oral daily  hydrocortisone hemorrhoidal Suppository 1 Suppository(s) Rectal two times a day  influenza  Vaccine (HIGH DOSE) 0.7 milliLiter(s) IntraMuscular once  levothyroxine 150 MICROGram(s) Oral daily  mesalamine Suppository 1000 milliGRAM(s) Rectal at bedtime  midodrine 10 milliGRAM(s) Oral every 8 hours  pantoprazole   Suspension 40 milliGRAM(s) Enteral Tube daily  polyethylene glycol 3350 17 Gram(s) Oral daily  senna 2 Tablet(s) Oral at bedtime      MEDICATIONS  (PRN):  acetaminophen     Tablet .. 650 milliGRAM(s) Oral every 6 hours PRN Mild Pain (1 - 3)  aluminum hydroxide/magnesium hydroxide/simethicone Suspension 10 milliLiter(s) Oral every 6 hours PRN dyspepsia  sodium chloride 0.9% lock flush 10 milliLiter(s) IV Push every 1 hour PRN Pre/post blood products, medications, blood draw, and to maintain line patency       Medications up to date at time of exam.      PHYSICAL EXAMINATION:  Patient has no new complaints.  GENERAL: The patient is a well-developed, well-nourished, in no apparent distress.     Vital Signs Last 24 Hrs  T(C): 36.5 (11 Oct 2023 13:26), Max: 36.5 (11 Oct 2023 05:07)  T(F): 97.7 (11 Oct 2023 13:26), Max: 97.7 (11 Oct 2023 05:07)  HR: 78 (11 Oct 2023 13:26) (78 - 90)  BP: 99/46 (11 Oct 2023 13:26) (99/46 - 114/59)  BP(mean): --  RR: 18 (11 Oct 2023 13:26) (18 - 18)  SpO2: 95% (11 Oct 2023 13:26) (94% - 98%)    Parameters below as of 11 Oct 2023 13:26  Patient On (Oxygen Delivery Method): room air       (if applicable)    Chest Tube (if applicable)    HEENT: Head is normocephalic and atraumatic. Extraocular muscles are intact. Mucous membranes are moist.     NECK: Supple, no palpable adenopathy. R IJ dialysis cath     LUNGS: Clear to auscultation, no wheezing, rales, or rhonchi.    HEART: Regular rate and rhythm without murmur.    ABDOMEN: Soft, nontender, and nondistended.  No hepatosplenomegaly is noted.    : No painful voiding, no pelvic pain    EXTREMITIES: Without any cyanosis, clubbing, rash, lesions or edema.    NEUROLOGIC: Awake,     SKIN: Warm, dry, good turgor.      LABS:                        7.7    16.65 )-----------( 126      ( 11 Oct 2023 05:26 )             23.1     10-11    134<L>  |  98  |  66<H>  ----------------------------<  99  4.5   |  28  |  4.68<H>    Ca    9.3      11 Oct 2023 05:26    TPro  6.4  /  Alb  2.0<L>  /  TBili  0.8  /  DBili  x   /  AST  14  /  ALT  15  /  AlkPhos  216<H>  10-11      Urinalysis Basic - ( 11 Oct 2023 05:26 )    Color: x / Appearance: x / SG: x / pH: x  Gluc: 99 mg/dL / Ketone: x  / Bili: x / Urobili: x   Blood: x / Protein: x / Nitrite: x   Leuk Esterase: x / RBC: x / WBC x   Sq Epi: x / Non Sq Epi: x / Bacteria: x        CARDIAC MARKERS ( 11 Oct 2023 05:26 )  x     / x     / <7 U/L / x     / x              MICROBIOLOGY: (if applicable)    RADIOLOGY & ADDITIONAL STUDIES:  EKG:   CXR:  < from: CT Abdomen and Pelvis No Cont (10.11.23 @ 10:08) >    ACC: [id number] EXAM:  CT ABDOMEN AND PELVIS   ORDERED BY: SARA LAGUNAS     ACC: [id number] EXAM:  CT CHEST   ORDERED BY: SARA LAGUNAS     PROCEDURE DATE:  10/11/2023          INTERPRETATION:  CLINICAL INFORMATION: MRSA bacteremia.    COMPARISON: None.    CONTRAST/COMPLICATIONS:  IV Contrast: NONE  Oral Contrast: NONE  Complications: None reported at time of study completion    PROCEDURE:  CT of the Chest, Abdomen and Pelvis was performed.  Sagittal and coronal reformats were performed.    FINDINGS:  CHEST:  LUNGS AND LARGE AIRWAYS: Patent central airways. Bilateral lower lobe   atelectatic changes; underlying parenchymal infiltrate/consolidation   cannot be excluded. Interlobular septal thickening identified, raising   suspicion for an element of interstitial edema.  PLEURA: Moderate to large right pleural effusion. Small to moderate left   pleural effusion. No evidence for pneumothorax.  VESSELS: Coronary arterial calcifications. Atherosclerotic aortic   calcifications.  HEART: Cardiomegaly. No pericardial effusion. Micra pacemaker. 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  MEDIASTINUM AND VILLA: No lymphadenopathy.  CHEST WALL AND LOWER NECK: Within normal limits.    ABDOMEN AND PELVIS:  LIVER: The liver is normal in size. No focal hepatic masses are   identified.  BILE DUCTS: Normal caliber.  GALLBLADDER: Large gallstone measuring 2.8 cm. No definite gallbladder   wall thickening.  SPLEEN: Within normal limits.  PANCREAS: Within normal limits.  ADRENALS: Within normal limits.  KIDNEYS/URETERS: A horseshoe kidney, with bilateral renal calculi   measuring up to 10 mm. No hydronephrosis. No discrete space-occupying   lesions of the renal parenchyma noted.    BLADDER: Poorly distended; element of bladder wall thickening cannot be   excluded. Mild stranding of the perivesical fat noted; correlate for   cystitis.  REPRODUCTIVE ORGANS: Foci of prostatic calcification noted.    BOWEL: Fecal material scattered throughout the colon. No evidence for   mechanical bowel obstruction. No colonic wall thickening. Appendix is   normal.  PERITONEUM: Small volume intra-abdominal ascites. No free intraperitoneal   air.  VESSELS: Atherosclerotic changes.  RETROPERITONEUM/LYMPH NODES: No lymphadenopathy.  ABDOMINAL WALL: Fat containing bilateral inguinal hernias, right greater   than left. Small fat-containing umbilical hernia. There is diffuse   subcutaneous edema consistent with anasarca.  BONES: Left femoral neck pinning. Status post sternotomy. Degenerative   changes.    IMPRESSION:  1. Bilateral pleural effusions, right greater left. Bilateral lower lobe   atelectatic changes; underlying parenchymal infiltrate/consolidation   cannot be excluded. Interlobular septal thickening.  2. 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   Mild stranding of the perivesical fat, with suspected bladder wall   thickening. Correlate for cystitis.          --- End of Report ---            CHRISTAL PERDOMO MD; Attending Radiologist  This document has been electronically signed. Oct 11 2023  2:47PM    < end of copied text >  ECHO:    IMPRESSION: 78y Male PAST MEDICAL & SURGICAL HISTORY:  Chronic atrial fibrillation      History of BPH      CAD (coronary artery disease)      Coronary stent patent      ESRD on dialysis      History of GI bleed      Mitral valve replaced       p/w       IMP: Assessment  - Septic shock, source suspected cardiac valves   - MRSA bacteremia   - AMS- metabolic encephelopathy  - Thrombocytopenia, suspect from Sepsis  - Underlying h/o CAD s/p PCI, BPH, ESRD on HD, s/p MVR, Afib    Plan:  - No sedation   - Antibx changed as per ID Dr lagunas  - Persistent MRSA bacteremia despite central line changed .   - ERENDIRA for valve evaluation for vegetation   - Pat may not be a surgical candidate for cardiac valve corrective surgery , if ERENDIRA is positive for vegetation then will need to address goal of care and duration of iv antibx   - Palliative eval noted   - Consider US of kidneys for possible stones   - No CT with contrast due to BEULAH on CKD   - CT chest abd pelvis noted   - Cont. midodrine for now, taper as tolerated but patient on it chronically pre HD  antibiotics as per ID,         Time of Visit:  Patient seen and examined. pat is lying in bed awake , comfortable     MEDICATIONS  (STANDING):  apixaban 5 milliGRAM(s) Oral two times a day  aspirin  chewable 81 milliGRAM(s) Oral daily  ceftaroline fosamil IVPB      ceftaroline fosamil IVPB 200 milliGRAM(s) IV Intermittent every 8 hours  chlorhexidine 2% Cloths 1 Application(s) Topical <User Schedule>  DAPTOmycin IVPB 600 milliGRAM(s) IV Intermittent every 48 hours  epoetin val (PROCRIT) Injectable 65984 Unit(s) IV Push <User Schedule>  folic acid 1 milliGRAM(s) Oral daily  hydrocortisone hemorrhoidal Suppository 1 Suppository(s) Rectal two times a day  influenza  Vaccine (HIGH DOSE) 0.7 milliLiter(s) IntraMuscular once  levothyroxine 150 MICROGram(s) Oral daily  mesalamine Suppository 1000 milliGRAM(s) Rectal at bedtime  midodrine 10 milliGRAM(s) Oral every 8 hours  pantoprazole   Suspension 40 milliGRAM(s) Enteral Tube daily  polyethylene glycol 3350 17 Gram(s) Oral daily  senna 2 Tablet(s) Oral at bedtime      MEDICATIONS  (PRN):  acetaminophen     Tablet .. 650 milliGRAM(s) Oral every 6 hours PRN Mild Pain (1 - 3)  aluminum hydroxide/magnesium hydroxide/simethicone Suspension 10 milliLiter(s) Oral every 6 hours PRN dyspepsia  sodium chloride 0.9% lock flush 10 milliLiter(s) IV Push every 1 hour PRN Pre/post blood products, medications, blood draw, and to maintain line patency       Medications up to date at time of exam.      PHYSICAL EXAMINATION:  Patient has no new complaints.  GENERAL: The patient is a well-developed, well-nourished, in no apparent distress.     Vital Signs Last 24 Hrs  T(C): 36.5 (11 Oct 2023 13:26), Max: 36.5 (11 Oct 2023 05:07)  T(F): 97.7 (11 Oct 2023 13:26), Max: 97.7 (11 Oct 2023 05:07)  HR: 78 (11 Oct 2023 13:26) (78 - 90)  BP: 99/46 (11 Oct 2023 13:26) (99/46 - 114/59)  BP(mean): --  RR: 18 (11 Oct 2023 13:26) (18 - 18)  SpO2: 95% (11 Oct 2023 13:26) (94% - 98%)    Parameters below as of 11 Oct 2023 13:26  Patient On (Oxygen Delivery Method): room air       (if applicable)    Chest Tube (if applicable)    HEENT: Head is normocephalic and atraumatic. Extraocular muscles are intact. Mucous membranes are moist.     NECK: Supple, no palpable adenopathy. R IJ dialysis cath     LUNGS: Clear to auscultation, no wheezing, rales, or rhonchi.    HEART: Regular rate and rhythm without murmur.    ABDOMEN: Soft, nontender, and nondistended.  No hepatosplenomegaly is noted.    : No painful voiding, no pelvic pain    EXTREMITIES: Without any cyanosis, clubbing, rash, lesions or edema.    NEUROLOGIC: Awake,     SKIN: Warm, dry, good turgor.      LABS:                        7.7    16.65 )-----------( 126      ( 11 Oct 2023 05:26 )             23.1     10-11    134<L>  |  98  |  66<H>  ----------------------------<  99  4.5   |  28  |  4.68<H>    Ca    9.3      11 Oct 2023 05:26    TPro  6.4  /  Alb  2.0<L>  /  TBili  0.8  /  DBili  x   /  AST  14  /  ALT  15  /  AlkPhos  216<H>  10-11      Urinalysis Basic - ( 11 Oct 2023 05:26 )    Color: x / Appearance: x / SG: x / pH: x  Gluc: 99 mg/dL / Ketone: x  / Bili: x / Urobili: x   Blood: x / Protein: x / Nitrite: x   Leuk Esterase: x / RBC: x / WBC x   Sq Epi: x / Non Sq Epi: x / Bacteria: x        CARDIAC MARKERS ( 11 Oct 2023 05:26 )  x     / x     / <7 U/L / x     / x              MICROBIOLOGY: (if applicable)    RADIOLOGY & ADDITIONAL STUDIES:  EKG:   CXR:  < from: CT Abdomen and Pelvis No Cont (10.11.23 @ 10:08) >    ACC: [id number] EXAM:  CT ABDOMEN AND PELVIS   ORDERED BY: SARA LAGUNAS     ACC: [id number] EXAM:  CT CHEST   ORDERED BY: SARA LAGUNAS     PROCEDURE DATE:  10/11/2023          INTERPRETATION:  CLINICAL INFORMATION: MRSA bacteremia.    COMPARISON: None.    CONTRAST/COMPLICATIONS:  IV Contrast: NONE  Oral Contrast: NONE  Complications: None reported at time of study completion    PROCEDURE:  CT of the Chest, Abdomen and Pelvis was performed.  Sagittal and coronal reformats were performed.    FINDINGS:  CHEST:  LUNGS AND LARGE AIRWAYS: Patent central airways. Bilateral lower lobe   atelectatic changes; underlying parenchymal infiltrate/consolidation   cannot be excluded. Interlobular septal thickening identified, raising   suspicion for an element of interstitial edema.  PLEURA: Moderate to large right pleural effusion. Small to moderate left   pleural effusion. No evidence for pneumothorax.  VESSELS: Coronary arterial calcifications. Atherosclerotic aortic   calcifications.  HEART: Cardiomegaly. No pericardial effusion. Micra pacemaker. 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  MEDIASTINUM AND VILLA: No lymphadenopathy.  CHEST WALL AND LOWER NECK: Within normal limits.    ABDOMEN AND PELVIS:  LIVER: The liver is normal in size. No focal hepatic masses are   identified.  BILE DUCTS: Normal caliber.  GALLBLADDER: Large gallstone measuring 2.8 cm. No definite gallbladder   wall thickening.  SPLEEN: Within normal limits.  PANCREAS: Within normal limits.  ADRENALS: Within normal limits.  KIDNEYS/URETERS: A horseshoe kidney, with bilateral renal calculi   measuring up to 10 mm. No hydronephrosis. No discrete space-occupying   lesions of the renal parenchyma noted.    BLADDER: Poorly distended; element of bladder wall thickening cannot be   excluded. Mild stranding of the perivesical fat noted; correlate for   cystitis.  REPRODUCTIVE ORGANS: Foci of prostatic calcification noted.    BOWEL: Fecal material scattered throughout the colon. No evidence for   mechanical bowel obstruction. No colonic wall thickening. Appendix is   normal.  PERITONEUM: Small volume intra-abdominal ascites. No free intraperitoneal   air.  VESSELS: Atherosclerotic changes.  RETROPERITONEUM/LYMPH NODES: No lymphadenopathy.  ABDOMINAL WALL: Fat containing bilateral inguinal hernias, right greater   than left. Small fat-containing umbilical hernia. There is diffuse   subcutaneous edema consistent with anasarca.  BONES: Left femoral neck pinning. Status post sternotomy. Degenerative   changes.    IMPRESSION:  1. Bilateral pleural effusions, right greater left. Bilateral lower lobe   atelectatic changes; underlying parenchymal infiltrate/consolidation   cannot be excluded. Interlobular septal thickening.  2. 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   Mild stranding of the perivesical fat, with suspected bladder wall   thickening. Correlate for cystitis.          --- End of Report ---            CHRISTAL PERDOMO MD; Attending Radiologist  This document has been electronically signed. Oct 11 2023  2:47PM    < end of copied text >  ECHO:    IMPRESSION: 78y Male PAST MEDICAL & SURGICAL HISTORY:  Chronic atrial fibrillation      History of BPH      CAD (coronary artery disease)      Coronary stent patent      ESRD on dialysis      History of GI bleed      Mitral valve replaced       p/w       IMP: Assessment  - Septic shock, source suspected cardiac valves   - MRSA bacteremia   - AMS- metabolic encephelopathy  - Thrombocytopenia, suspect from Sepsis  - Underlying h/o CAD s/p PCI, BPH, ESRD on HD, s/p MVR, Afib    Plan:  - No sedation   - Antibx changed as per ID Dr lagunas  - Persistent MRSA bacteremia despite central line changed .   - ERENDIRA for valve evaluation for vegetation   - Pat may not be a surgical candidate for cardiac valve corrective surgery , if ERENDIRA is positive for vegetation then will need to address goal of care and duration of iv antibx   - Palliative eval noted   - Consider US of kidneys for possible stones   - No CT with contrast due to BEULAH on CKD   - CT chest abd pelvis noted   - Cont. midodrine for now, taper as tolerated but patient on it chronically pre HD  antibiotics as per ID,         Time of Visit:  Patient seen and examined. pat is lying in bed awake , comfortable     MEDICATIONS  (STANDING):  apixaban 5 milliGRAM(s) Oral two times a day  aspirin  chewable 81 milliGRAM(s) Oral daily  ceftaroline fosamil IVPB      ceftaroline fosamil IVPB 200 milliGRAM(s) IV Intermittent every 8 hours  chlorhexidine 2% Cloths 1 Application(s) Topical <User Schedule>  DAPTOmycin IVPB 600 milliGRAM(s) IV Intermittent every 48 hours  epoetin val (PROCRIT) Injectable 43948 Unit(s) IV Push <User Schedule>  folic acid 1 milliGRAM(s) Oral daily  hydrocortisone hemorrhoidal Suppository 1 Suppository(s) Rectal two times a day  influenza  Vaccine (HIGH DOSE) 0.7 milliLiter(s) IntraMuscular once  levothyroxine 150 MICROGram(s) Oral daily  mesalamine Suppository 1000 milliGRAM(s) Rectal at bedtime  midodrine 10 milliGRAM(s) Oral every 8 hours  pantoprazole   Suspension 40 milliGRAM(s) Enteral Tube daily  polyethylene glycol 3350 17 Gram(s) Oral daily  senna 2 Tablet(s) Oral at bedtime      MEDICATIONS  (PRN):  acetaminophen     Tablet .. 650 milliGRAM(s) Oral every 6 hours PRN Mild Pain (1 - 3)  aluminum hydroxide/magnesium hydroxide/simethicone Suspension 10 milliLiter(s) Oral every 6 hours PRN dyspepsia  sodium chloride 0.9% lock flush 10 milliLiter(s) IV Push every 1 hour PRN Pre/post blood products, medications, blood draw, and to maintain line patency       Medications up to date at time of exam.      PHYSICAL EXAMINATION:  Patient has no new complaints.  GENERAL: The patient is a well-developed, well-nourished, in no apparent distress.     Vital Signs Last 24 Hrs  T(C): 36.5 (11 Oct 2023 13:26), Max: 36.5 (11 Oct 2023 05:07)  T(F): 97.7 (11 Oct 2023 13:26), Max: 97.7 (11 Oct 2023 05:07)  HR: 78 (11 Oct 2023 13:26) (78 - 90)  BP: 99/46 (11 Oct 2023 13:26) (99/46 - 114/59)  BP(mean): --  RR: 18 (11 Oct 2023 13:26) (18 - 18)  SpO2: 95% (11 Oct 2023 13:26) (94% - 98%)    Parameters below as of 11 Oct 2023 13:26  Patient On (Oxygen Delivery Method): room air       (if applicable)    Chest Tube (if applicable)    HEENT: Head is normocephalic and atraumatic. Extraocular muscles are intact. Mucous membranes are moist.     NECK: Supple, no palpable adenopathy. R IJ dialysis cath     LUNGS: Clear to auscultation, no wheezing, rales, or rhonchi.    HEART: Regular rate and rhythm without murmur.    ABDOMEN: Soft, nontender, and nondistended.  No hepatosplenomegaly is noted.    : No painful voiding, no pelvic pain    EXTREMITIES: Without any cyanosis, clubbing, rash, lesions or edema.    NEUROLOGIC: Awake,     SKIN: Warm, dry, good turgor.      LABS:                        7.7    16.65 )-----------( 126      ( 11 Oct 2023 05:26 )             23.1     10-11    134<L>  |  98  |  66<H>  ----------------------------<  99  4.5   |  28  |  4.68<H>    Ca    9.3      11 Oct 2023 05:26    TPro  6.4  /  Alb  2.0<L>  /  TBili  0.8  /  DBili  x   /  AST  14  /  ALT  15  /  AlkPhos  216<H>  10-11      Urinalysis Basic - ( 11 Oct 2023 05:26 )    Color: x / Appearance: x / SG: x / pH: x  Gluc: 99 mg/dL / Ketone: x  / Bili: x / Urobili: x   Blood: x / Protein: x / Nitrite: x   Leuk Esterase: x / RBC: x / WBC x   Sq Epi: x / Non Sq Epi: x / Bacteria: x        CARDIAC MARKERS ( 11 Oct 2023 05:26 )  x     / x     / <7 U/L / x     / x              MICROBIOLOGY: (if applicable)    RADIOLOGY & ADDITIONAL STUDIES:  EKG:   CXR:  < from: CT Abdomen and Pelvis No Cont (10.11.23 @ 10:08) >    ACC: [id number] EXAM:  CT ABDOMEN AND PELVIS   ORDERED BY: SARA LAGUNAS     ACC: [id number] EXAM:  CT CHEST   ORDERED BY: SARA LAGUNAS     PROCEDURE DATE:  10/11/2023          INTERPRETATION:  CLINICAL INFORMATION: MRSA bacteremia.    COMPARISON: None.    CONTRAST/COMPLICATIONS:  IV Contrast: NONE  Oral Contrast: NONE  Complications: None reported at time of study completion    PROCEDURE:  CT of the Chest, Abdomen and Pelvis was performed.  Sagittal and coronal reformats were performed.    FINDINGS:  CHEST:  LUNGS AND LARGE AIRWAYS: Patent central airways. Bilateral lower lobe   atelectatic changes; underlying parenchymal infiltrate/consolidation   cannot be excluded. Interlobular septal thickening identified, raising   suspicion for an element of interstitial edema.  PLEURA: Moderate to large right pleural effusion. Small to moderate left   pleural effusion. No evidence for pneumothorax.  VESSELS: Coronary arterial calcifications. Atherosclerotic aortic   calcifications.  HEART: Cardiomegaly. No pericardial effusion. Micra pacemaker. 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  MEDIASTINUM AND VILLA: No lymphadenopathy.  CHEST WALL AND LOWER NECK: Within normal limits.    ABDOMEN AND PELVIS:  LIVER: The liver is normal in size. No focal hepatic masses are   identified.  BILE DUCTS: Normal caliber.  GALLBLADDER: Large gallstone measuring 2.8 cm. No definite gallbladder   wall thickening.  SPLEEN: Within normal limits.  PANCREAS: Within normal limits.  ADRENALS: Within normal limits.  KIDNEYS/URETERS: A horseshoe kidney, with bilateral renal calculi   measuring up to 10 mm. No hydronephrosis. No discrete space-occupying   lesions of the renal parenchyma noted.    BLADDER: Poorly distended; element of bladder wall thickening cannot be   excluded. Mild stranding of the perivesical fat noted; correlate for   cystitis.  REPRODUCTIVE ORGANS: Foci of prostatic calcification noted.    BOWEL: Fecal material scattered throughout the colon. No evidence for   mechanical bowel obstruction. No colonic wall thickening. Appendix is   normal.  PERITONEUM: Small volume intra-abdominal ascites. No free intraperitoneal   air.  VESSELS: Atherosclerotic changes.  RETROPERITONEUM/LYMPH NODES: No lymphadenopathy.  ABDOMINAL WALL: Fat containing bilateral inguinal hernias, right greater   than left. Small fat-containing umbilical hernia. There is diffuse   subcutaneous edema consistent with anasarca.  BONES: Left femoral neck pinning. Status post sternotomy. Degenerative   changes.    IMPRESSION:  1. Bilateral pleural effusions, right greater left. Bilateral lower lobe   atelectatic changes; underlying parenchymal infiltrate/consolidation   cannot be excluded. Interlobular septal thickening.  2. 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   Mild stranding of the perivesical fat, with suspected bladder wall   thickening. Correlate for cystitis.          --- End of Report ---            CHRISTAL PERDOMO MD; Attending Radiologist  This document has been electronically signed. Oct 11 2023  2:47PM    < end of copied text >  ECHO:    IMPRESSION: 78y Male PAST MEDICAL & SURGICAL HISTORY:  Chronic atrial fibrillation      History of BPH      CAD (coronary artery disease)      Coronary stent patent      ESRD on dialysis      History of GI bleed      Mitral valve replaced       p/w       IMP: Assessment  - Septic shock, source suspected cardiac valves   - MRSA bacteremia   - AMS- metabolic encephelopathy  - Thrombocytopenia, suspect from Sepsis  - Underlying h/o CAD s/p PCI, BPH, ESRD on HD, s/p MVR, Afib    Plan:  - No sedation   - Antibx changed as per ID Dr lagunas  - Persistent MRSA bacteremia despite central line changed .   - ERENDIRA for valve evaluation for vegetation   - Pat may not be a surgical candidate for cardiac valve corrective surgery , if ERENDIRA is positive for vegetation then will need to address goal of care and duration of iv antibx   - Palliative eval noted   - Consider US of kidneys for possible stones   - No CT with contrast due to BEULAH on CKD   - CT chest abd pelvis noted   - Cont. midodrine for now, taper as tolerated but patient on it chronically pre HD  antibiotics as per ID,

## 2023-10-11 NOTE — PROVIDER CONTACT NOTE (CRITICAL VALUE NOTIFICATION) - PERSON GIVING RESULT:
Deana ([facility]) Deana ([facility]) Deana ([facility])

## 2023-10-11 NOTE — PROGRESS NOTE ADULT - ASSESSMENT
Assessment:  Liam Garza is a 78 year old man with past medical history of End stage renal disease (on HD), Coronary artery disease (s/p CABG), Bioprosthetic MVR, ANAHI closure with AtriClip in 2021, Cardiomyopathy, Atrial fibrillation, End stage renal disease with prior hospitalization in March at [facility] with pacemaker extraction secondary to MSSA bacteremia with replacement with Micra PPM, then recent hospitalization at [facility] in August for rectal bleeding now presented with fever and cough, found to have septic shock secondary to MRSA bacteremia s/p pressor support and ICU course, now stable on medical floor.     Cardiology re-consulted to evaluate if patient is candidate for ERENDIRA to evaluate for endocarditis. ECG consistent with atrial fibrillation and old inferior infarct, no acute ischemic ST abnormalities. Echo report consistent with LVEF 45-50%, mild-moderate tricuspid regurgitation and mild aortic valve stenosis. The patient was evaluated at bedside, remains encephalopathic.     Recommendations:  [] Septic shock with MRSA bacteremia: S/p removal of tunneled dialysis catheter. Infectious workup per ID. ERENDIRA was initially contraindicated due to altered mental status/encephalopathic, hemodynamic instability with shock on pressor support and severe thrombocytopenia with high risk for traumatic bleeding with ERENDIRA probe insertion. This was discussed in detail with Infectious Disease/Dr. Kat previously.     Primary team and ID/Dr Green requesting ERENDIRA re-evaluation. The patient has improved platelets but remains encephalopathic. He will need GI clearance due to prior GI bleed in August and ongoing anemia. Will follow up with ID if ERENDIRA will , the patient is not a surgical candidate. Continue IV antibiotics per ID. Plan for repeat transthoracic echocardiogram today. Patient is encephalopathic and cannot make decisions, therefore discussed with patient's HCP/Surekha ([phone]) and she requests this to be discussed with patient's private cardiologist, Dr. Roni Lainez ([phone]) prior to any decisions about ERENDIRA being made, discussed case at length with patient's cardiologist this morning who prefers conservative approach and does not feel that ERENDIRA is appropriate or within the patient's goals of care considering his prior multiple hospitalizations and progressive deterioration. Patient getting CT scan of chest and abdomen to evaluate for etiology of persistent bacteremia.    Prognosis is guarded. Called HCP/Surekha was unable to reach her and left voicemail message. Will sign out this case to cardiologist to follow along tomorrow.     Thu Bass MD  Cardiology        Assessment:  Liam Garza is a 78 year old man with past medical history of End stage renal disease (on HD), Coronary artery disease (s/p CABG), Bioprosthetic MVR, ANAHI closure with AtriClip in 2021, Cardiomyopathy, Atrial fibrillation, End stage renal disease with prior hospitalization in March at [facility] with pacemaker extraction secondary to MSSA bacteremia with replacement with Micra PPM, then recent hospitalization at [facility] in August for rectal bleeding now presented with fever and cough, found to have septic shock secondary to MRSA bacteremia s/p pressor support and ICU course, now stable on medical floor.     Cardiology re-consulted to evaluate if patient is candidate for ERENDIRA to evaluate for endocarditis. ECG consistent with atrial fibrillation and old inferior infarct, no acute ischemic ST abnormalities. Echo report consistent with LVEF 45-50%, mild-moderate tricuspid regurgitation and mild aortic valve stenosis. The patient was evaluated at bedside, remains encephalopathic.     Recommendations:  [] Septic shock with MRSA bacteremia: S/p removal of tunneled dialysis catheter. Infectious workup per ID. ERENDIRA was initially contraindicated due to altered mental status/encephalopathic, hemodynamic instability with shock on pressor support and severe thrombocytopenia with high risk for traumatic bleeding with ERENDIRA probe insertion. This was discussed in detail with Infectious Disease/Dr. Kat previously.     Primary team and ID/Dr Green requesting ERENDIRA re-evaluation. The patient has improved platelets but remains encephalopathic. He will need GI clearance due to prior GI bleed in August and ongoing anemia. Will follow up with ID if ERENDIRA will , the patient is not a surgical candidate. Continue IV antibiotics per ID. Plan for repeat transthoracic echocardiogram today. Patient is encephalopathic and cannot make decisions, therefore discussed with patient's HCP/Surekha ([phone]) and she requests this to be discussed with patient's private cardiologist, Dr. Roni Lainez ([phone]) prior to any decisions about ERENDIRA being made, discussed case at length with patient's cardiologist this morning who prefers conservative approach and does not feel that ERENDIRA is appropriate or within the patient's goals of care considering his prior multiple hospitalizations and progressive deterioration. Patient getting CT scan of chest and abdomen to evaluate for etiology of persistent bacteremia.    Prognosis is guarded. Called HCP/Surekha was unable to reach her and left voicemail message. Will sign out this case to cardiologist to follow along tomorrow.     Thu Bass MD  Cardiology        Assessment:  Liam Garza is a 78 year old man with past medical history of End stage renal disease (on HD), Coronary artery disease (s/p CABG), Bioprosthetic MVR, ANAHI closure with AtriClip in 2021, Cardiomyopathy, Atrial fibrillation, End stage renal disease with prior hospitalization in March at [facility] with pacemaker extraction secondary to MSSA bacteremia with replacement with Micra PPM, then recent hospitalization at [facility] in August for rectal bleeding now presented with fever and cough, found to have septic shock secondary to MRSA bacteremia s/p pressor support and ICU course, now stable on medical floor.     Cardiology re-consulted to evaluate if patient is candidate for ERENDIRA to evaluate for endocarditis. ECG consistent with atrial fibrillation and old inferior infarct, no acute ischemic ST abnormalities. Echo report consistent with LVEF 45-50%, mild-moderate tricuspid regurgitation and mild aortic valve stenosis. The patient was evaluated at bedside, remains encephalopathic.     Recommendations:  [] Septic shock with MRSA bacteremia: S/p removal of tunneled dialysis catheter. Infectious workup per ID. ERENDIRA was initially contraindicated due to altered mental status/encephalopathic, hemodynamic instability with shock on pressor support and severe thrombocytopenia with high risk for traumatic bleeding with ERENDIRA probe insertion. This was discussed in detail with Infectious Disease/Dr. Kat previously.     Primary team and ID/Dr Green requesting ERENDIRA re-evaluation. The patient has improved platelets but remains encephalopathic. He will need GI clearance due to prior GI bleed in August and ongoing anemia. Will follow up with ID if ERENDIRA will , the patient is not a surgical candidate. Continue IV antibiotics per ID. Plan for repeat transthoracic echocardiogram today. Patient is encephalopathic and cannot make decisions, therefore discussed with patient's HCP/Surekha ([phone]) and she requests this to be discussed with patient's private cardiologist, Dr. Roni Lainez ([phone]) prior to any decisions about ERENDIRA being made, discussed case at length with patient's cardiologist this morning who prefers conservative approach and does not feel that ERENDIRA is appropriate or within the patient's goals of care considering his prior multiple hospitalizations and progressive deterioration. Patient getting CT scan of chest and abdomen to evaluate for etiology of persistent bacteremia.    Prognosis is guarded. Called HCP/Surekha was unable to reach her and left voicemail message. Will sign out this case to cardiologist to follow along tomorrow.     Thu Bass MD  Cardiology        Assessment:  Liam Garza is a 78 year old man with past medical history of End stage renal disease (on HD), Coronary artery disease (s/p CABG), Bioprosthetic MVR, ANAIH closure with AtriClip in 2021, Cardiomyopathy, Atrial fibrillation, End stage renal disease with prior hospitalization in March at [facility] with pacemaker extraction secondary to MSSA bacteremia with replacement with Micra PPM, then recent hospitalization at [facility] in August for rectal bleeding now presented with fever and cough, found to have septic shock secondary to MRSA bacteremia s/p pressor support and ICU course, now stable on medical floor.     Cardiology re-consulted to evaluate if patient is candidate for ERENDIRA to evaluate for endocarditis. ECG consistent with atrial fibrillation and old inferior infarct, no acute ischemic ST abnormalities. Echo report consistent with LVEF 45-50%, mild-moderate tricuspid regurgitation and mild aortic valve stenosis. The patient was evaluated at bedside, remains encephalopathic.     Recommendations:  [] Septic shock with MRSA bacteremia: S/p removal of tunneled dialysis catheter. Infectious workup per ID. ERENDIRA was initially contraindicated due to altered mental status/encephalopathic, hemodynamic instability with shock on pressor support and severe thrombocytopenia with high risk for traumatic bleeding with ERENDIRA probe insertion. This was discussed in detail with Infectious Disease/Dr. Kat previously.     Primary team and ID/Dr Green requesting ERENDIRA re-evaluation. The patient has improved platelets but remains encephalopathic. He will need GI clearance due to prior GI bleed in August and ongoing anemia. Will follow up with ID if ERENDIRA will , the patient is not a surgical candidate. Continue IV antibiotics per ID. Plan for repeat transthoracic echocardiogram today. Patient is encephalopathic and cannot make decisions, therefore discussed with patient's HCP/Surekha ([phone]) and she requests this to be discussed with patient's private cardiologist, Dr. Roni Lainez ([phone]) prior to any decisions about ERENDIRA being made, discussed case at length with patient's cardiologist this morning who prefers conservative approach and does not feel that ERENDIRA is appropriate or within the patient's goals of care considering his prior multiple hospitalizations and progressive deterioration. Patient getting CT scan of chest and abdomen to evaluate for etiology of persistent bacteremia.    Addendum:    TTE Report:   1. Normal global left ventricular systolic function.   2. Left ventricular ejection fraction, by visual estimation, is 50 to 55%.   3. Normal left ventricular internal cavity size.   4. There is mild septal left ventricular hypertrophy.   5. Abnormal septal motion likely secondary to post-operative status.   6. Moderately enlarged right ventricle with low normal right ventricle   systolic function.   7. Severely enlarged left atrium.   8. Severely enlarged right atrium.   9. There is a bioprosthetic valve in the mitral position, the apparatus   appears abnormally thickened and calcified. There is an small irregular, partially mobile echodensity visualized on the posterior mitral valve annulus which may be calcification, however cannot rule out small valvular vegetation of the posterior mitral annulus. There is a thin filamentous echodensity which appears attached to the mitral valve apparatus. There is at least mild mitral regurgitation, the mitral   regurgitant jet is not well visualized. Elevated mitral valve mean   gradient 9 mmHg at HR 94 BPM.  10. Moderate-severe tricuspid regurgitation.  11. Mild aortic valve leaflet calcification. No aortic valve stenosis.  12. Moderate aortic regurgitation.  13. Mild pulmonic valve regurgitation.  14. 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  15. Moderately dilated pulmonary artery.  16. Estimated pulmonary artery systolic pressure is 41.2 mmHg assuming a right atrial pressure of 8 mmHg, which is consistent with mild pulmonary hypertension.  17. There is no evidence of pericardial effusion.  18. Recommend clinical correlation with the above findings.    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     Prognosis is guarded. Called [facility]/Surekha was unable to reach her and left voicemail message. Will sign out this case to cardiologist to follow along tomorrow.     Thu Bass MD  Cardiology        Assessment:  Liam Garza is a 78 year old man with past medical history of End stage renal disease (on HD), Coronary artery disease (s/p CABG), Bioprosthetic MVR, ANAHI closure with AtriClip in 2021, Cardiomyopathy, Atrial fibrillation, End stage renal disease with prior hospitalization in March at [facility] with pacemaker extraction secondary to MSSA bacteremia with replacement with Micra PPM, then recent hospitalization at [facility] in August for rectal bleeding now presented with fever and cough, found to have septic shock secondary to MRSA bacteremia s/p pressor support and ICU course, now stable on medical floor.     Cardiology re-consulted to evaluate if patient is candidate for ERENDIRA to evaluate for endocarditis. ECG consistent with atrial fibrillation and old inferior infarct, no acute ischemic ST abnormalities. Echo report consistent with LVEF 45-50%, mild-moderate tricuspid regurgitation and mild aortic valve stenosis. The patient was evaluated at bedside, remains encephalopathic.     Recommendations:  [] Septic shock with MRSA bacteremia: S/p removal of tunneled dialysis catheter. Infectious workup per ID. ERENDIRA was initially contraindicated due to altered mental status/encephalopathic, hemodynamic instability with shock on pressor support and severe thrombocytopenia with high risk for traumatic bleeding with ERENDIRA probe insertion. This was discussed in detail with Infectious Disease/Dr. Kat previously.     Primary team and ID/Dr Green requesting ERENDIRA re-evaluation. The patient has improved platelets but remains encephalopathic. He will need GI clearance due to prior GI bleed in August and ongoing anemia. Will follow up with ID if ERENDIRA will , the patient is not a surgical candidate. Continue IV antibiotics per ID. Plan for repeat transthoracic echocardiogram today. Patient is encephalopathic and cannot make decisions, therefore discussed with patient's HCP/Surekha ([phone]) and she requests this to be discussed with patient's private cardiologist, Dr. Roni Lainez ([phone]) prior to any decisions about ERENDIRA being made, discussed case at length with patient's cardiologist this morning who prefers conservative approach and does not feel that ERENDIRA is appropriate or within the patient's goals of care considering his prior multiple hospitalizations and progressive deterioration. Patient getting CT scan of chest and abdomen to evaluate for etiology of persistent bacteremia.    Addendum:    TTE Report:   1. Normal global left ventricular systolic function.   2. Left ventricular ejection fraction, by visual estimation, is 50 to 55%.   3. Normal left ventricular internal cavity size.   4. There is mild septal left ventricular hypertrophy.   5. Abnormal septal motion likely secondary to post-operative status.   6. Moderately enlarged right ventricle with low normal right ventricle   systolic function.   7. Severely enlarged left atrium.   8. Severely enlarged right atrium.   9. There is a bioprosthetic valve in the mitral position, the apparatus   appears abnormally thickened and calcified. There is an small irregular, partially mobile echodensity visualized on the posterior mitral valve annulus which may be calcification, however cannot rule out small valvular vegetation of the posterior mitral annulus. There is a thin filamentous echodensity which appears attached to the mitral valve apparatus. There is at least mild mitral regurgitation, the mitral   regurgitant jet is not well visualized. Elevated mitral valve mean   gradient 9 mmHg at HR 94 BPM.  10. Moderate-severe tricuspid regurgitation.  11. Mild aortic valve leaflet calcification. No aortic valve stenosis.  12. Moderate aortic regurgitation.  13. Mild pulmonic valve regurgitation.  14. 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  15. Moderately dilated pulmonary artery.  16. Estimated pulmonary artery systolic pressure is 41.2 mmHg assuming a right atrial pressure of 8 mmHg, which is consistent with mild pulmonary hypertension.  17. There is no evidence of pericardial effusion.  18. Recommend clinical correlation with the above findings.    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     Prognosis is guarded. Called [facility]/Surekha was unable to reach her and left voicemail message. Will sign out this case to cardiologist to follow along tomorrow.     Thu Bass MD  Cardiology        Assessment:  Liam Garza is a 78 year old man with past medical history of End stage renal disease (on HD), Coronary artery disease (s/p CABG), Bioprosthetic MVR, ANAHI closure with AtriClip in 2021, Cardiomyopathy, Atrial fibrillation, End stage renal disease with prior hospitalization in March at [facility] with pacemaker extraction secondary to MSSA bacteremia with replacement with Micra PPM, then recent hospitalization at [facility] in August for rectal bleeding now presented with fever and cough, found to have septic shock secondary to MRSA bacteremia s/p pressor support and ICU course, now stable on medical floor.     Cardiology re-consulted to evaluate if patient is candidate for ERENDIRA to evaluate for endocarditis. ECG consistent with atrial fibrillation and old inferior infarct, no acute ischemic ST abnormalities. Echo report consistent with LVEF 45-50%, mild-moderate tricuspid regurgitation and mild aortic valve stenosis. The patient was evaluated at bedside, remains encephalopathic.     Recommendations:  [] Septic shock with MRSA bacteremia: S/p removal of tunneled dialysis catheter. Infectious workup per ID. ERENDIRA was initially contraindicated due to altered mental status/encephalopathic, hemodynamic instability with shock on pressor support and severe thrombocytopenia with high risk for traumatic bleeding with ERENDIRA probe insertion. This was discussed in detail with Infectious Disease/Dr. Kat previously.     Primary team and ID/Dr Green requesting ERENDIRA re-evaluation. The patient has improved platelets but remains encephalopathic. He will need GI clearance due to prior GI bleed in August and ongoing anemia. Will follow up with ID if ERENDIRA will , the patient is not a surgical candidate. Continue IV antibiotics per ID. Plan for repeat transthoracic echocardiogram today. Patient is encephalopathic and cannot make decisions, therefore discussed with patient's HCP/Surekha ([phone]) and she requests this to be discussed with patient's private cardiologist, Dr. Roni Lainez ([phone]) prior to any decisions about ERENDIRA being made, discussed case at length with patient's cardiologist this morning who prefers conservative approach and does not feel that ERENDIRA is appropriate or within the patient's goals of care considering his prior multiple hospitalizations and progressive deterioration. Patient getting CT scan of chest and abdomen to evaluate for etiology of persistent bacteremia.    Addendum:    TTE Report:   1. Normal global left ventricular systolic function.   2. Left ventricular ejection fraction, by visual estimation, is 50 to 55%.   3. Normal left ventricular internal cavity size.   4. There is mild septal left ventricular hypertrophy.   5. Abnormal septal motion likely secondary to post-operative status.   6. Moderately enlarged right ventricle with low normal right ventricle   systolic function.   7. Severely enlarged left atrium.   8. Severely enlarged right atrium.   9. There is a bioprosthetic valve in the mitral position, the apparatus   appears abnormally thickened and calcified. There is an small irregular, partially mobile echodensity visualized on the posterior mitral valve annulus which may be calcification, however cannot rule out small valvular vegetation of the posterior mitral annulus. There is a thin filamentous echodensity which appears attached to the mitral valve apparatus. There is at least mild mitral regurgitation, the mitral   regurgitant jet is not well visualized. Elevated mitral valve mean   gradient 9 mmHg at HR 94 BPM.  10. Moderate-severe tricuspid regurgitation.  11. Mild aortic valve leaflet calcification. No aortic valve stenosis.  12. Moderate aortic regurgitation.  13. Mild pulmonic valve regurgitation.  14. 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  15. Moderately dilated pulmonary artery.  16. Estimated pulmonary artery systolic pressure is 41.2 mmHg assuming a right atrial pressure of 8 mmHg, which is consistent with mild pulmonary hypertension.  17. There is no evidence of pericardial effusion.  18. Recommend clinical correlation with the above findings.    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     Prognosis is guarded. Called [facility]/Surekha was unable to reach her and left voicemail message. Will sign out this case to cardiologist to follow along tomorrow.     Thu Bass MD  Cardiology

## 2023-10-11 NOTE — PROGRESS NOTE ADULT - SUBJECTIVE AND OBJECTIVE BOX
CC: f/u for  mrsa bacteremia  Patient reports  he thinks he is ok  REVIEW OF SYSTEMS:  All other review of systems negative (Comprehensive ROS)    Antimicrobials Day #  :11 total abx  ceftaroline fosamil IVPB 200 milliGRAM(s) IV Intermittent every 8 hours day 6  ceftaroline fosamil IVPB      DAPTOmycin IVPB 600 milliGRAM(s) IV Intermittent every 48 hours  day 2    Other Medications Reviewed    T(F): 97.6 (10-11-23 @ 20:36), Max: 97.7 (10-11-23 @ 05:07)  HR: 108 (10-11-23 @ 20:36)  BP: 117/62 (10-11-23 @ 20:36)  RR: 20 (10-11-23 @ 20:36)  SpO2: 97% (10-11-23 @ 20:36)  Wt(kg): --    PHYSICAL EXAM:  General:more  alert, no acute distress  Eyes:  anicteric, no conjunctival injection, no discharge  Oropharynx: no lesions or injection 	  Neck: supple, without adenopathy  Lungs: poor effort  to auscultation  Heart: regular rate and rhythm; no murmur, rubs or gallops  Abdomen: soft, nondistended, nontender, without mass or organomegaly  Skin: no lesions  Extremities: no clubbing, cyanosis, , arms with  edema  Neurologic: more awake, moves all    LAB RESULTS:                        7.7    16.65 )-----------( 126      ( 11 Oct 2023 05:26 )             23.1     10-11    134<L>  |  98  |  66<H>  ----------------------------<  99  4.5   |  28  |  4.68<H>    Ca    9.3      11 Oct 2023 05:26    TPro  6.4  /  Alb  2.0<L>  /  TBili  0.8  /  DBili  x   /  AST  14  /  ALT  15  /  AlkPhos  216<H>  10-11    LIVER FUNCTIONS - ( 11 Oct 2023 05:26 )  Alb: 2.0 g/dL / Pro: 6.4 g/dL / ALK PHOS: 216 U/L / ALT: 15 U/L / AST: 14 U/L / GGT: x           Urinalysis Basic - ( 11 Oct 2023 05:26 )    Color: x / Appearance: x / SG: x / pH: x  Gluc: 99 mg/dL / Ketone: x  / Bili: x / Urobili: x   Blood: x / Protein: x / Nitrite: x   Leuk Esterase: x / RBC: x / WBC x   Sq Epi: x / Non Sq Epi: x / Bacteria: x      MICROBIOLOGY:  RECENT CULTURES:  10-09 @ 07:00 .Blood Blood Methicillin resistant Staphylococcus aureus    Growth in aerobic and anaerobic bottles: Methicillin Resistant  Staphylococcus aureus  See previous culture [id number]    Growth in aerobic bottle: Gram Positive Cocci in Clusters  Growth in anaerobic bottle: Gram Positive Cocci in Clusters        RADIOLOGY REVIEWED:    < from: CT Abdomen and Pelvis No Cont (10.11.23 @ 10:08) >    ACC: [id number] EXAM:  CT ABDOMEN AND PELVIS   ORDERED BY: SARA HERNANDEZ     ACC: [id number] EXAM:  CT CHEST   ORDERED BY: SARA HERNANDEZ     PROCEDURE DATE:  10/11/2023          INTERPRETATION:  CLINICAL INFORMATION: MRSA bacteremia.    COMPARISON: None.    CONTRAST/COMPLICATIONS:  IV Contrast: NONE  Oral Contrast: NONE  Complications: None reported at time of study completion    PROCEDURE:  CT of the Chest, Abdomen and Pelvis was performed.  Sagittal and coronal reformats were performed.    FINDINGS:  CHEST:  LUNGS AND LARGE AIRWAYS: Patent central airways. Bilateral lower lobe   atelectatic changes; underlying parenchymal infiltrate/consolidation   cannot be excluded. Interlobular septal thickening identified, raising   suspicion for an element of interstitial edema.  PLEURA: Moderate to large right pleural effusion. Small to moderate left   pleural effusion. No evidence for pneumothorax.  VESSELS: Coronary arterial calcifications. Atherosclerotic aortic   calcifications.  HEART: Cardiomegaly. No pericardial effusion. Micra pacemaker. 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  MEDIASTINUM AND VILLA: No lymphadenopathy.  CHEST WALL AND LOWER NECK: Within normal limits.    ABDOMEN AND PELVIS:  LIVER: The liver is normal in size. No focal hepatic masses are   identified.  BILE DUCTS: Normal caliber.  GALLBLADDER: Large gallstone measuring 2.8 cm. No definite gallbladder   wall thickening.  SPLEEN: Within normal limits.  PANCREAS: Within normal limits.  ADRENALS: Within normal limits.  KIDNEYS/URETERS: A horseshoe kidney, with bilateral renal calculi   measuring up to 10 mm. No hydronephrosis. No discrete space-occupying   lesions of the renal parenchyma noted.    BLADDER: Poorly distended; element of bladder wall thickening cannot be   excluded. Mild stranding of the perivesical fat noted; correlate for   cystitis.  REPRODUCTIVE ORGANS: Foci of prostatic calcification noted.    BOWEL: Fecal material scattered throughout the colon. No evidence for   mechanical bowel obstruction. No colonic wall thickening. Appendix is   normal.  PERITONEUM: Small volume intra-abdominal ascites. No free intraperitoneal   air.  VESSELS: Atherosclerotic changes.  RETROPERITONEUM/LYMPH NODES: No lymphadenopathy.  ABDOMINAL WALL: Fat containing bilateral inguinal hernias, right greater   than left. Small fat-containing umbilical hernia. There is diffuse   subcutaneous edema consistent with anasarca.  BONES: Left femoral neck pinning. Status post sternotomy. Degenerative   changes.    IMPRESSION:  1. Bilateral pleural effusions, right greater left. Bilateral lower lobe   atelectatic changes; underlying parenchymal infiltrate/consolidation   cannot be excluded. Interlobular septal thickening.  2. 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   Mild stranding of the perivesical fat, with suspected bladder wall   thickening. Correlate for cystitis.          --- End of Report ---          < end of copied text >  CEDURE DATE:  10/11/2023          INTERPRETATION:  TRANSTHORACIC ECHOCARDIOGRAM REPORT        Patient Name:   JANEE ARCEO Patient Location: [address] [place] [address] Rec #:  [id number] Accession #:      [id number]  Account #:      [id number]   Height:           68.0 in 172.6 cm  YOB: 1945  Weight:           160.1 lb 72.60 kg  Patient Age:    78 years   BSA:              1.86 m²  Patient Gender: M          BP:    106/72 mmHg      Date of Exam:        10/11/2023 8:58:08 AM  Sonographer:         Lonnie Alves  Referring Physician: JIMBO OLIVIA    Procedure:     Follow up or Limited Echocardiogram.  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        2D AND M-MODE MEASUREMENTS (normal ranges within parentheses):  Left Ventricle:                  Normal   Aorta/Left Atrium:            Normal  IVSd (2D):              1.11 cm (0.7-1.1) Aortic Root (2D):  4.06 cm   (2.4-3.7)  LVPWd (2D):             0.94 cm (0.7-1.1) Left Atrium (2D):  6.01 cm   (1.9-4.0)  LVIDd (2D):             5.67 cm (3.4-5.7)  LVIDs (2D):             3.33 cm  LVFS (2D):             41.3 %   (>25%)  LV EF (2D):              71 %    (>55%)  Relative Wall Thickness  0.33    (<0.42)    SPECTRAL DOPPLER ANALYSIS (where applicable):  Mitral Valve:  MV Max Inder:   1.98 m/s  MV Mean Grad: 8.5 mmHg    Aortic Valve: AoV Max Inder: 1.35 m/s AoV Peak P.3 mmHg AoV Mean PG:   3.8 mmHg    LVOT Vmax: 0.94 m/s LVOT VTI: 0.174 m LVOT Diameter: 2.34 cm    AoV Area, Vmax: 2.99 cm² AoV Area, VTI: 3.94 cm² AoV Area, Vmn: 2.72 cm²  Ao VTI: 0.190  Aortic Insufficiency:  AI Half-time:  337 msec  AI Decel Rate: 2.87 m/s²    Tricuspid Valve and PA/RV Systolic Pressure: TR Max Velocity: 2.88 m/s RA   Pressure: 8 mmHg RVSP/PASP: 41.2 mmHg      PHYSICIAN INTERPRETATION:  Left Ventricle: The left ventricular internal cavity sizeis normal.  Global LV systolic function was normal. Left ventricular ejection   fraction, by visual estimation, is 50 to 55%. Abnormal septal motion   likely secondary to post-operative status.  Right Ventricle: The right ventricular size is moderately enlarged. RV   systolic function is low normal.  Left Atrium: Severely enlarged left atrium.  Right Atrium: Severely enlarged right atrium.  Pericardium: There is no evidence of pericardial effusion.  Mitral Valve: There is a bioprosthetic valve inthe mitral position, the   apparatus appears abnormally thickened and calcified. There is an small   irregular, partially mobile echodensity visualized on the posterior   mitral valve annulus which may be calcification, however cannot rule out   small valvular vegetation of the posterior mitral annulus. There is a   thin filamentous echodensity which appears attached to the mitral valve   apparatus. There is at least mild mitral regurgitation, the mitral   regurgitant jet is not well visualized. Elevated mitral valve mean   gradient    9 mmHg at HR 94 BPM.  Tricuspid Valve: The tricuspid valve is normal in structure.   Moderate-severe tricuspid regurgitation is visualized. Estimated   pulmonary artery systolic pressure is 41.2 mmHg assuming aright atrial   pressure of 8 mmHg, which is consistent with mild pulmonary hypertension.  Aortic Valve: The aortic valve is trileaflet. Moderate aortic valve   regurgitation is seen. Mild aortic valve leaflet calcification. No aortic   valve stenosis.  Pulmonic Valve: The pulmonic valve is normal. Mild pulmonic valve   regurgitation.  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  Pulmonary Artery: The pulmonary artery is moderately dilated.  Venous: The inferior vena cava was normal sized, with respiratory size   variation less than 50%.  Additional Comments: A pacer wire is visualized in the right ventricle.      Summary:   1. Normal global left ventricular systolic function.   2. Left ventricular ejection fraction, by visual estimation, is 50 to   55%.   3. Normal left ventricular internal cavity size.   4. There is mild septal left ventricular hypertrophy.   5. Abnormal septal motion likely secondary to post-operative status.   6. Moderately enlarged right ventricle with low normal right ventricle   systolic function.   7. Severely enlarged left atrium.   8. Severely enlarged right atrium.   9. There is a bioprosthetic valve in the mitral position, the apparatus   appears abnormally thickened and calcified. There is ansmall irregular,   partially mobile echodensity visualized on the posterior mitral valve   annulus which may be calcification, however cannot rule out small   valvular vegetation of the posterior mitral annulus. There is a thin   filamentous echodensity which appears attached to the mitral valve   apparatus. There is at least mild mitral regurgitation, the mitral   regurgitant jet is not well visualized. Elevated mitral valve mean   gradient    9 mmHg at HR 94 BPM.            Assessment:  Patient with esrd on dialysis via a permacath, mvr, s/p ppm removal for mssa bacteremia/micra pacer placed, came in from nursing home with fever, hypotension, on , found ot have high grade mrsa bacteremia. He had the permacath removed. He had a tte no veg initially, doppler no dvt in arms. He now has a shiley. He is a poor surgical candidate for ohs redo so talya on hold. He is so far still bacteremic so  changed vanco to dapto, ceftaroline added last week for synergy. Repeat TTE shows probable veg on mvr.  Clinically he is a little better today with improved ms, wbc down a bit. He has new bc pending from today. He likely has lack of source control despite removal of his permacath giving ongoing mrsa bacteremia  because the mvr is likely infected. CT cap unrevealing for alternative protected focus. Goc discussions are ongoing  Plan:  continue antibiotics with daptomycin and ceftaroline.   f/u blood cx from yesterday  can hold on talya since tte showed probable veg  ongoing goc discussions  CC: f/u for  mrsa bacteremia  Patient reports  he thinks he is ok  REVIEW OF SYSTEMS:  All other review of systems negative (Comprehensive ROS)    Antimicrobials Day #  :11 total abx  ceftaroline fosamil IVPB 200 milliGRAM(s) IV Intermittent every 8 hours day 6  ceftaroline fosamil IVPB      DAPTOmycin IVPB 600 milliGRAM(s) IV Intermittent every 48 hours  day 2    Other Medications Reviewed    T(F): 97.6 (10-11-23 @ 20:36), Max: 97.7 (10-11-23 @ 05:07)  HR: 108 (10-11-23 @ 20:36)  BP: 117/62 (10-11-23 @ 20:36)  RR: 20 (10-11-23 @ 20:36)  SpO2: 97% (10-11-23 @ 20:36)  Wt(kg): --    PHYSICAL EXAM:  General:more  alert, no acute distress  Eyes:  anicteric, no conjunctival injection, no discharge  Oropharynx: no lesions or injection 	  Neck: supple, without adenopathy  Lungs: poor effort  to auscultation  Heart: regular rate and rhythm; no murmur, rubs or gallops  Abdomen: soft, nondistended, nontender, without mass or organomegaly  Skin: no lesions  Extremities: no clubbing, cyanosis, , arms with  edema  Neurologic: more awake, moves all    LAB RESULTS:                        7.7    16.65 )-----------( 126      ( 11 Oct 2023 05:26 )             23.1     10-11    134<L>  |  98  |  66<H>  ----------------------------<  99  4.5   |  28  |  4.68<H>    Ca    9.3      11 Oct 2023 05:26    TPro  6.4  /  Alb  2.0<L>  /  TBili  0.8  /  DBili  x   /  AST  14  /  ALT  15  /  AlkPhos  216<H>  10-11    LIVER FUNCTIONS - ( 11 Oct 2023 05:26 )  Alb: 2.0 g/dL / Pro: 6.4 g/dL / ALK PHOS: 216 U/L / ALT: 15 U/L / AST: 14 U/L / GGT: x           Urinalysis Basic - ( 11 Oct 2023 05:26 )    Color: x / Appearance: x / SG: x / pH: x  Gluc: 99 mg/dL / Ketone: x  / Bili: x / Urobili: x   Blood: x / Protein: x / Nitrite: x   Leuk Esterase: x / RBC: x / WBC x   Sq Epi: x / Non Sq Epi: x / Bacteria: x      MICROBIOLOGY:  RECENT CULTURES:  10-09 @ 07:00 .Blood Blood Methicillin resistant Staphylococcus aureus    Growth in aerobic and anaerobic bottles: Methicillin Resistant  Staphylococcus aureus  See previous culture [id number]    Growth in aerobic bottle: Gram Positive Cocci in Clusters  Growth in anaerobic bottle: Gram Positive Cocci in Clusters        RADIOLOGY REVIEWED:    < from: CT Abdomen and Pelvis No Cont (10.11.23 @ 10:08) >    ACC: [id number] EXAM:  CT ABDOMEN AND PELVIS   ORDERED BY: SARA HERNANDEZ     ACC: [id number] EXAM:  CT CHEST   ORDERED BY: SARA HERNANDEZ     PROCEDURE DATE:  10/11/2023          INTERPRETATION:  CLINICAL INFORMATION: MRSA bacteremia.    COMPARISON: None.    CONTRAST/COMPLICATIONS:  IV Contrast: NONE  Oral Contrast: NONE  Complications: None reported at time of study completion    PROCEDURE:  CT of the Chest, Abdomen and Pelvis was performed.  Sagittal and coronal reformats were performed.    FINDINGS:  CHEST:  LUNGS AND LARGE AIRWAYS: Patent central airways. Bilateral lower lobe   atelectatic changes; underlying parenchymal infiltrate/consolidation   cannot be excluded. Interlobular septal thickening identified, raising   suspicion for an element of interstitial edema.  PLEURA: Moderate to large right pleural effusion. Small to moderate left   pleural effusion. No evidence for pneumothorax.  VESSELS: Coronary arterial calcifications. Atherosclerotic aortic   calcifications.  HEART: Cardiomegaly. No pericardial effusion. Micra pacemaker. 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  MEDIASTINUM AND VILLA: No lymphadenopathy.  CHEST WALL AND LOWER NECK: Within normal limits.    ABDOMEN AND PELVIS:  LIVER: The liver is normal in size. No focal hepatic masses are   identified.  BILE DUCTS: Normal caliber.  GALLBLADDER: Large gallstone measuring 2.8 cm. No definite gallbladder   wall thickening.  SPLEEN: Within normal limits.  PANCREAS: Within normal limits.  ADRENALS: Within normal limits.  KIDNEYS/URETERS: A horseshoe kidney, with bilateral renal calculi   measuring up to 10 mm. No hydronephrosis. No discrete space-occupying   lesions of the renal parenchyma noted.    BLADDER: Poorly distended; element of bladder wall thickening cannot be   excluded. Mild stranding of the perivesical fat noted; correlate for   cystitis.  REPRODUCTIVE ORGANS: Foci of prostatic calcification noted.    BOWEL: Fecal material scattered throughout the colon. No evidence for   mechanical bowel obstruction. No colonic wall thickening. Appendix is   normal.  PERITONEUM: Small volume intra-abdominal ascites. No free intraperitoneal   air.  VESSELS: Atherosclerotic changes.  RETROPERITONEUM/LYMPH NODES: No lymphadenopathy.  ABDOMINAL WALL: Fat containing bilateral inguinal hernias, right greater   than left. Small fat-containing umbilical hernia. There is diffuse   subcutaneous edema consistent with anasarca.  BONES: Left femoral neck pinning. Status post sternotomy. Degenerative   changes.    IMPRESSION:  1. Bilateral pleural effusions, right greater left. Bilateral lower lobe   atelectatic changes; underlying parenchymal infiltrate/consolidation   cannot be excluded. Interlobular septal thickening.  2. 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   Mild stranding of the perivesical fat, with suspected bladder wall   thickening. Correlate for cystitis.          --- End of Report ---          < end of copied text >  CEDURE DATE:  10/11/2023          INTERPRETATION:  TRANSTHORACIC ECHOCARDIOGRAM REPORT        Patient Name:   JANEE ARCEO Patient Location: [address] [place] [address] Rec #:  [id number] Accession #:      [id number]  Account #:      [id number]   Height:           68.0 in 172.6 cm  YOB: 1945  Weight:           160.1 lb 72.60 kg  Patient Age:    78 years   BSA:              1.86 m²  Patient Gender: M          BP:    106/72 mmHg      Date of Exam:        10/11/2023 8:58:08 AM  Sonographer:         Lonnie Alves  Referring Physician: JIMBO OLIVIA    Procedure:     Follow up or Limited Echocardiogram.  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        2D AND M-MODE MEASUREMENTS (normal ranges within parentheses):  Left Ventricle:                  Normal   Aorta/Left Atrium:            Normal  IVSd (2D):              1.11 cm (0.7-1.1) Aortic Root (2D):  4.06 cm   (2.4-3.7)  LVPWd (2D):             0.94 cm (0.7-1.1) Left Atrium (2D):  6.01 cm   (1.9-4.0)  LVIDd (2D):             5.67 cm (3.4-5.7)  LVIDs (2D):             3.33 cm  LVFS (2D):             41.3 %   (>25%)  LV EF (2D):              71 %    (>55%)  Relative Wall Thickness  0.33    (<0.42)    SPECTRAL DOPPLER ANALYSIS (where applicable):  Mitral Valve:  MV Max Inder:   1.98 m/s  MV Mean Grad: 8.5 mmHg    Aortic Valve: AoV Max Inder: 1.35 m/s AoV Peak P.3 mmHg AoV Mean PG:   3.8 mmHg    LVOT Vmax: 0.94 m/s LVOT VTI: 0.174 m LVOT Diameter: 2.34 cm    AoV Area, Vmax: 2.99 cm² AoV Area, VTI: 3.94 cm² AoV Area, Vmn: 2.72 cm²  Ao VTI: 0.190  Aortic Insufficiency:  AI Half-time:  337 msec  AI Decel Rate: 2.87 m/s²    Tricuspid Valve and PA/RV Systolic Pressure: TR Max Velocity: 2.88 m/s RA   Pressure: 8 mmHg RVSP/PASP: 41.2 mmHg      PHYSICIAN INTERPRETATION:  Left Ventricle: The left ventricular internal cavity sizeis normal.  Global LV systolic function was normal. Left ventricular ejection   fraction, by visual estimation, is 50 to 55%. Abnormal septal motion   likely secondary to post-operative status.  Right Ventricle: The right ventricular size is moderately enlarged. RV   systolic function is low normal.  Left Atrium: Severely enlarged left atrium.  Right Atrium: Severely enlarged right atrium.  Pericardium: There is no evidence of pericardial effusion.  Mitral Valve: There is a bioprosthetic valve inthe mitral position, the   apparatus appears abnormally thickened and calcified. There is an small   irregular, partially mobile echodensity visualized on the posterior   mitral valve annulus which may be calcification, however cannot rule out   small valvular vegetation of the posterior mitral annulus. There is a   thin filamentous echodensity which appears attached to the mitral valve   apparatus. There is at least mild mitral regurgitation, the mitral   regurgitant jet is not well visualized. Elevated mitral valve mean   gradient    9 mmHg at HR 94 BPM.  Tricuspid Valve: The tricuspid valve is normal in structure.   Moderate-severe tricuspid regurgitation is visualized. Estimated   pulmonary artery systolic pressure is 41.2 mmHg assuming aright atrial   pressure of 8 mmHg, which is consistent with mild pulmonary hypertension.  Aortic Valve: The aortic valve is trileaflet. Moderate aortic valve   regurgitation is seen. Mild aortic valve leaflet calcification. No aortic   valve stenosis.  Pulmonic Valve: The pulmonic valve is normal. Mild pulmonic valve   regurgitation.  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  Pulmonary Artery: The pulmonary artery is moderately dilated.  Venous: The inferior vena cava was normal sized, with respiratory size   variation less than 50%.  Additional Comments: A pacer wire is visualized in the right ventricle.      Summary:   1. Normal global left ventricular systolic function.   2. Left ventricular ejection fraction, by visual estimation, is 50 to   55%.   3. Normal left ventricular internal cavity size.   4. There is mild septal left ventricular hypertrophy.   5. Abnormal septal motion likely secondary to post-operative status.   6. Moderately enlarged right ventricle with low normal right ventricle   systolic function.   7. Severely enlarged left atrium.   8. Severely enlarged right atrium.   9. There is a bioprosthetic valve in the mitral position, the apparatus   appears abnormally thickened and calcified. There is ansmall irregular,   partially mobile echodensity visualized on the posterior mitral valve   annulus which may be calcification, however cannot rule out small   valvular vegetation of the posterior mitral annulus. There is a thin   filamentous echodensity which appears attached to the mitral valve   apparatus. There is at least mild mitral regurgitation, the mitral   regurgitant jet is not well visualized. Elevated mitral valve mean   gradient    9 mmHg at HR 94 BPM.            Assessment:  Patient with esrd on dialysis via a permacath, mvr, s/p ppm removal for mssa bacteremia/micra pacer placed, came in from nursing home with fever, hypotension, on , found ot have high grade mrsa bacteremia. He had the permacath removed. He had a tte no veg initially, doppler no dvt in [facility]. He now has a shiley. He is a poor surgical candidate for ohs redo so talya on hold. He is so far still bacteremic so  changed vanco to dapto, ceftaroline added last week for synergy. Repeat TTE shows probable veg on mvr.  Clinically he is a little better today with improved ms, wbc down a bit. He has new bc pending from today. He likely has lack of source control despite removal of his permacath giving ongoing mrsa bacteremia  because the mvr is likely infected. CT cap unrevealing for alternative protected focus. Goc discussions are ongoing  Plan:  continue antibiotics with daptomycin and ceftaroline.   f/u blood cx from yesterday  can hold on talya since tte showed probable veg  ongoing goc discussions  CC: f/u for  mrsa bacteremia  Patient reports  he thinks he is ok  REVIEW OF SYSTEMS:  All other review of systems negative (Comprehensive ROS)    Antimicrobials Day #  :11 total abx  ceftaroline fosamil IVPB 200 milliGRAM(s) IV Intermittent every 8 hours day 6  ceftaroline fosamil IVPB      DAPTOmycin IVPB 600 milliGRAM(s) IV Intermittent every 48 hours  day 2    Other Medications Reviewed    T(F): 97.6 (10-11-23 @ 20:36), Max: 97.7 (10-11-23 @ 05:07)  HR: 108 (10-11-23 @ 20:36)  BP: 117/62 (10-11-23 @ 20:36)  RR: 20 (10-11-23 @ 20:36)  SpO2: 97% (10-11-23 @ 20:36)  Wt(kg): --    PHYSICAL EXAM:  General:more  alert, no acute distress  Eyes:  anicteric, no conjunctival injection, no discharge  Oropharynx: no lesions or injection 	  Neck: supple, without adenopathy  Lungs: poor effort  to auscultation  Heart: regular rate and rhythm; no murmur, rubs or gallops  Abdomen: soft, nondistended, nontender, without mass or organomegaly  Skin: no lesions  Extremities: no clubbing, cyanosis, , arms with  edema  Neurologic: more awake, moves all    LAB RESULTS:                        7.7    16.65 )-----------( 126      ( 11 Oct 2023 05:26 )             23.1     10-11    134<L>  |  98  |  66<H>  ----------------------------<  99  4.5   |  28  |  4.68<H>    Ca    9.3      11 Oct 2023 05:26    TPro  6.4  /  Alb  2.0<L>  /  TBili  0.8  /  DBili  x   /  AST  14  /  ALT  15  /  AlkPhos  216<H>  10-11    LIVER FUNCTIONS - ( 11 Oct 2023 05:26 )  Alb: 2.0 g/dL / Pro: 6.4 g/dL / ALK PHOS: 216 U/L / ALT: 15 U/L / AST: 14 U/L / GGT: x           Urinalysis Basic - ( 11 Oct 2023 05:26 )    Color: x / Appearance: x / SG: x / pH: x  Gluc: 99 mg/dL / Ketone: x  / Bili: x / Urobili: x   Blood: x / Protein: x / Nitrite: x   Leuk Esterase: x / RBC: x / WBC x   Sq Epi: x / Non Sq Epi: x / Bacteria: x      MICROBIOLOGY:  RECENT CULTURES:  10-09 @ 07:00 .Blood Blood Methicillin resistant Staphylococcus aureus    Growth in aerobic and anaerobic bottles: Methicillin Resistant  Staphylococcus aureus  See previous culture [id number]    Growth in aerobic bottle: Gram Positive Cocci in Clusters  Growth in anaerobic bottle: Gram Positive Cocci in Clusters        RADIOLOGY REVIEWED:    < from: CT Abdomen and Pelvis No Cont (10.11.23 @ 10:08) >    ACC: [id number] EXAM:  CT ABDOMEN AND PELVIS   ORDERED BY: SARA HERNANDEZ     ACC: [id number] EXAM:  CT CHEST   ORDERED BY: SARA HERNANDEZ     PROCEDURE DATE:  10/11/2023          INTERPRETATION:  CLINICAL INFORMATION: MRSA bacteremia.    COMPARISON: None.    CONTRAST/COMPLICATIONS:  IV Contrast: NONE  Oral Contrast: NONE  Complications: None reported at time of study completion    PROCEDURE:  CT of the Chest, Abdomen and Pelvis was performed.  Sagittal and coronal reformats were performed.    FINDINGS:  CHEST:  LUNGS AND LARGE AIRWAYS: Patent central airways. Bilateral lower lobe   atelectatic changes; underlying parenchymal infiltrate/consolidation   cannot be excluded. Interlobular septal thickening identified, raising   suspicion for an element of interstitial edema.  PLEURA: Moderate to large right pleural effusion. Small to moderate left   pleural effusion. No evidence for pneumothorax.  VESSELS: Coronary arterial calcifications. Atherosclerotic aortic   calcifications.  HEART: Cardiomegaly. No pericardial effusion. Micra pacemaker. 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  MEDIASTINUM AND VILLA: No lymphadenopathy.  CHEST WALL AND LOWER NECK: Within normal limits.    ABDOMEN AND PELVIS:  LIVER: The liver is normal in size. No focal hepatic masses are   identified.  BILE DUCTS: Normal caliber.  GALLBLADDER: Large gallstone measuring 2.8 cm. No definite gallbladder   wall thickening.  SPLEEN: Within normal limits.  PANCREAS: Within normal limits.  ADRENALS: Within normal limits.  KIDNEYS/URETERS: A horseshoe kidney, with bilateral renal calculi   measuring up to 10 mm. No hydronephrosis. No discrete space-occupying   lesions of the renal parenchyma noted.    BLADDER: Poorly distended; element of bladder wall thickening cannot be   excluded. Mild stranding of the perivesical fat noted; correlate for   cystitis.  REPRODUCTIVE ORGANS: Foci of prostatic calcification noted.    BOWEL: Fecal material scattered throughout the colon. No evidence for   mechanical bowel obstruction. No colonic wall thickening. Appendix is   normal.  PERITONEUM: Small volume intra-abdominal ascites. No free intraperitoneal   air.  VESSELS: Atherosclerotic changes.  RETROPERITONEUM/LYMPH NODES: No lymphadenopathy.  ABDOMINAL WALL: Fat containing bilateral inguinal hernias, right greater   than left. Small fat-containing umbilical hernia. There is diffuse   subcutaneous edema consistent with anasarca.  BONES: Left femoral neck pinning. Status post sternotomy. Degenerative   changes.    IMPRESSION:  1. Bilateral pleural effusions, right greater left. Bilateral lower lobe   atelectatic changes; underlying parenchymal infiltrate/consolidation   cannot be excluded. Interlobular septal thickening.  2. 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   Mild stranding of the perivesical fat, with suspected bladder wall   thickening. Correlate for cystitis.          --- End of Report ---          < end of copied text >  CEDURE DATE:  10/11/2023          INTERPRETATION:  TRANSTHORACIC ECHOCARDIOGRAM REPORT        Patient Name:   JANEE ARCEO Patient Location: [address] [place] [address] Rec #:  [id number] Accession #:      [id number]  Account #:      [id number]   Height:           68.0 in 172.6 cm  YOB: 1945  Weight:           160.1 lb 72.60 kg  Patient Age:    78 years   BSA:              1.86 m²  Patient Gender: M          BP:    106/72 mmHg      Date of Exam:        10/11/2023 8:58:08 AM  Sonographer:         Lonnie Alves  Referring Physician: JIMBO OLIVIA    Procedure:     Follow up or Limited Echocardiogram.  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        2D AND M-MODE MEASUREMENTS (normal ranges within parentheses):  Left Ventricle:                  Normal   Aorta/Left Atrium:            Normal  IVSd (2D):              1.11 cm (0.7-1.1) Aortic Root (2D):  4.06 cm   (2.4-3.7)  LVPWd (2D):             0.94 cm (0.7-1.1) Left Atrium (2D):  6.01 cm   (1.9-4.0)  LVIDd (2D):             5.67 cm (3.4-5.7)  LVIDs (2D):             3.33 cm  LVFS (2D):             41.3 %   (>25%)  LV EF (2D):              71 %    (>55%)  Relative Wall Thickness  0.33    (<0.42)    SPECTRAL DOPPLER ANALYSIS (where applicable):  Mitral Valve:  MV Max Inder:   1.98 m/s  MV Mean Grad: 8.5 mmHg    Aortic Valve: AoV Max Inder: 1.35 m/s AoV Peak P.3 mmHg AoV Mean PG:   3.8 mmHg    LVOT Vmax: 0.94 m/s LVOT VTI: 0.174 m LVOT Diameter: 2.34 cm    AoV Area, Vmax: 2.99 cm² AoV Area, VTI: 3.94 cm² AoV Area, Vmn: 2.72 cm²  Ao VTI: 0.190  Aortic Insufficiency:  AI Half-time:  337 msec  AI Decel Rate: 2.87 m/s²    Tricuspid Valve and PA/RV Systolic Pressure: TR Max Velocity: 2.88 m/s RA   Pressure: 8 mmHg RVSP/PASP: 41.2 mmHg      PHYSICIAN INTERPRETATION:  Left Ventricle: The left ventricular internal cavity sizeis normal.  Global LV systolic function was normal. Left ventricular ejection   fraction, by visual estimation, is 50 to 55%. Abnormal septal motion   likely secondary to post-operative status.  Right Ventricle: The right ventricular size is moderately enlarged. RV   systolic function is low normal.  Left Atrium: Severely enlarged left atrium.  Right Atrium: Severely enlarged right atrium.  Pericardium: There is no evidence of pericardial effusion.  Mitral Valve: There is a bioprosthetic valve inthe mitral position, the   apparatus appears abnormally thickened and calcified. There is an small   irregular, partially mobile echodensity visualized on the posterior   mitral valve annulus which may be calcification, however cannot rule out   small valvular vegetation of the posterior mitral annulus. There is a   thin filamentous echodensity which appears attached to the mitral valve   apparatus. There is at least mild mitral regurgitation, the mitral   regurgitant jet is not well visualized. Elevated mitral valve mean   gradient    9 mmHg at HR 94 BPM.  Tricuspid Valve: The tricuspid valve is normal in structure.   Moderate-severe tricuspid regurgitation is visualized. Estimated   pulmonary artery systolic pressure is 41.2 mmHg assuming aright atrial   pressure of 8 mmHg, which is consistent with mild pulmonary hypertension.  Aortic Valve: The aortic valve is trileaflet. Moderate aortic valve   regurgitation is seen. Mild aortic valve leaflet calcification. No aortic   valve stenosis.  Pulmonic Valve: The pulmonic valve is normal. Mild pulmonic valve   regurgitation.  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  Pulmonary Artery: The pulmonary artery is moderately dilated.  Venous: The inferior vena cava was normal sized, with respiratory size   variation less than 50%.  Additional Comments: A pacer wire is visualized in the right ventricle.      Summary:   1. Normal global left ventricular systolic function.   2. Left ventricular ejection fraction, by visual estimation, is 50 to   55%.   3. Normal left ventricular internal cavity size.   4. There is mild septal left ventricular hypertrophy.   5. Abnormal septal motion likely secondary to post-operative status.   6. Moderately enlarged right ventricle with low normal right ventricle   systolic function.   7. Severely enlarged left atrium.   8. Severely enlarged right atrium.   9. There is a bioprosthetic valve in the mitral position, the apparatus   appears abnormally thickened and calcified. There is ansmall irregular,   partially mobile echodensity visualized on the posterior mitral valve   annulus which may be calcification, however cannot rule out small   valvular vegetation of the posterior mitral annulus. There is a thin   filamentous echodensity which appears attached to the mitral valve   apparatus. There is at least mild mitral regurgitation, the mitral   regurgitant jet is not well visualized. Elevated mitral valve mean   gradient    9 mmHg at HR 94 BPM.            Assessment:  Patient with esrd on dialysis via a permacath, mvr, s/p ppm removal for mssa bacteremia/micra pacer placed, came in from nursing home with fever, hypotension, on , found ot have high grade mrsa bacteremia. He had the permacath removed. He had a tte no veg initially, doppler no dvt in arms. He now has a shiley. He is a poor surgical candidate for ohs redo so talya on hold. He is so far still bacteremic so  changed vanco to dapto, ceftaroline added last week for synergy. Repeat TTE shows probable veg on mvr.  Clinically he is a little better today with improved ms, wbc down a bit. He has new bc pending from today. He likely has lack of source control despite removal of his permacath giving ongoing mrsa bacteremia  because the mvr is likely infected. CT cap unrevealing for alternative protected focus. Goc discussions are ongoing  Plan:  continue antibiotics with daptomycin and ceftaroline.   f/u blood cx from yesterday  can hold on talya since tte showed probable veg  ongoing goc discussions

## 2023-10-11 NOTE — PROGRESS NOTE ADULT - ASSESSMENT
Patient is a 78M h/o ESRD, Afib on Eliquis, CAD s/p PCI, prosthetic s/p MVR, h/o proctitis, hemorrhoids, hypothyroidism was in ICU for metabolic encephalopathy, septic shock 2/2 MRSA bacteremia s/p tunnel cath removal 10/3, IJ Shiley placed 10/4, downgraded to 3E for further management.    #Septic shock 2/2 MRSA bacteremia  #Metabolic encephalopathy  - awake, AOx0-1  - CTH 10/4 was negative for acute intracranial pathology  - plan to wean off midodrine as tolerated  - maintain O2 sat >94%  - speech and swallow eval: puree with thin liquids  - Aspiration precaution     #MRSA bacteremia  - permacath removed 10/3, shiley placed 10/4  - TTE 10/1/23 EF 45-50%, no vegetations  - c/w ceftaroline q8  - switched vancomycin to daptomycin by ID, recs appreciated  - BCx 10/6 +MRSA, BCx 10/9 gm positive clusters  - F/U repeat BCx 10/11  - concern for endocarditis, but not a candidate for valve replacements  - CT chest, abd/pelvis today, F/U  - considering ERENDIRA for endocarditis; cardio on board, pending discussion with HCP Surekha    #ESRD  - HD M/W/F  - HD today 10/11  - c/w epogen on HD days  - Shiley placed in ICU 10/4  - Nephro consult appreciated  - daily weights    #Afib  - rate controlled  - h/o CAD s/p PCI, s/p MVR  - c/w Eliquis 5mg bid  - c/w ASA    #Hypothyroidism  - TSH elevated  - synthroid dose increased to 150mcg  - F/U outpatient to titrate as needed    #h/o hemorrhoids, proctitis  - c/w hydrocortisone suppository, mesalamine  - c/w bowel regimen    #DVT ppx  - Eliquis 5mg bid    #GI ppx  - c/w pantoprazole 40mg qd    Full code. Palliative on board. Patient is a 78M h/o ESRD, Afib on Eliquis, CAD s/p PCI, prosthetic s/p MVR, h/o proctitis, hemorrhoids, hypothyroidism was in ICU for metabolic encephalopathy, septic shock 2/2 MRSA bacteremia s/p tunnel cath removal 10/3, IJ Shiley placed 10/4, downgraded to 3E for further management.    #Septic shock 2/2 MRSA bacteremia  #Metabolic encephalopathy  - awake, AOx0-1  - CTH 10/4 was negative for acute intracranial pathology  - plan to wean off midodrine as tolerated  - maintain O2 sat >94%  - speech and swallow eval: puree with thin liquids  - Aspiration precaution     #MRSA bacteremia  - permacath removed 10/3, shiley placed 10/4  - TTE 10/1/23 EF 45-50%, no vegetations  - c/w ceftaroline q8  - switched vancomycin to daptomycin by ID, recs appreciated  - BCx 10/6 +MRSA, BCx 10/9 gm positive clusters  - F/U repeat BCx 10/11  - concern for endocarditis, but not a candidate for valve replacements  - CT chest, abd/pelvis 10/11: b/l pleural effusions with lower lobe atelectatic changes, parenchymal infiltrate/consolidation, interlobular septal thickening. Renal calculi, horshoe kidney, suspected bladder wall thickening.  - considering ERENDIRA for endocarditis; cardio on board, pending discussion with HCP Surekha    #ESRD  - HD M/W/F  - HD today 10/11  - c/w epogen on HD days  - Sofía placed in ICU 10/4  - Nephro consult appreciated  - daily weights    #Afib  - rate controlled  - h/o CAD s/p PCI, s/p MVR  - c/w Eliquis 5mg bid  - c/w ASA    #Hypothyroidism  - TSH elevated  - synthroid dose increased to 150mcg  - F/U outpatient to titrate as needed    #h/o hemorrhoids, proctitis  - c/w hydrocortisone suppository, mesalamine  - c/w bowel regimen    #DVT ppx  - Eliquis 5mg bid    #GI ppx  - c/w pantoprazole 40mg qd    Full code. Palliative on board. Patient is a 78M h/o ESRD, Afib on Eliquis, CAD s/p PCI, prosthetic s/p MVR, h/o proctitis, hemorrhoids, hypothyroidism was in ICU for metabolic encephalopathy, septic shock 2/2 MRSA bacteremia s/p tunnel cath removal 10/3, IJ Shiley placed 10/4, downgraded to 3E for further management.    #Septic shock 2/2 MRSA bacteremia  #Metabolic encephalopathy  - awake, AOx0-1  - CTH 10/4 was negative for acute intracranial pathology  - plan to wean off midodrine as tolerated  - maintain O2 sat >94%  - speech and swallow eval: puree with thin liquids  - Aspiration precaution     #MRSA bacteremia  - permacath removed 10/3, shiley placed 10/4  - TTE 10/1/23 EF 45-50%, no vegetations  - c/w ceftaroline q8  - switched vancomycin to daptomycin by ID, recs appreciated  - BCx 10/6 +MRSA, BCx 10/9 gm positive clusters  - F/U repeat BCx 10/11  - concern for endocarditis, but not a candidate for valve replacements  - CT chest, abd/pelvis 10/11: b/l pleural effusions with lower lobe atelectatic changes, parenchymal infiltrate/consolidation, interlobular septal thickening. Renal calculi, horshoe kidney, suspected bladder wall thickening.  - considering ERNEDIRA for endocarditis; cardio on board, pending discussion with HCP Surekha    #ESRD  - HD M/W/F  - HD today 10/11  - Sofía placed in ICU 10/4  - Nephro consult appreciated  - daily weights    Anemia of ESRD/AOCD  -H/H stable  -FOBT negative  -Epo per nephrology   -Monitor H/H    Thrombocytopenia (Improved)  -Heme following  -Likely secondary to sepsis  -Monitor counts    #Afib  - CHADsVASc 5  - rate controlled  - h/o CAD s/p PCI, s/p MVR  - c/w Eliquis 5mg bid  - c/w ASA    #Hypothyroidism  - TSH elevated  - synthroid dose increased to 150mcg  - F/U outpatient to titrate as needed    #h/o hemorrhoids, proctitis  - c/w hydrocortisone suppository, mesalamine  - c/w bowel regimen    #DVT ppx  - Eliquis 5mg bid    #GI ppx  - c/w pantoprazole 40mg qd    Full code. Palliative on board. Patient is a 78M h/o ESRD, Afib on Eliquis, CAD s/p PCI, prosthetic s/p MVR, h/o proctitis, hemorrhoids, hypothyroidism was in ICU for metabolic encephalopathy, septic shock 2/2 MRSA bacteremia s/p tunnel cath removal 10/3, IJ Shiley placed 10/4, downgraded to 3E for further management.    #Septic shock 2/2 MRSA bacteremia  #Metabolic encephalopathy  - awake, AOx0-1  - CTH 10/4 was negative for acute intracranial pathology  - plan to wean off midodrine as tolerated  - maintain O2 sat >94%  - speech and swallow eval: puree with thin liquids  - Aspiration precaution     #MRSA bacteremia  - permacath removed 10/3, shiley placed 10/4  - TTE 10/1/23 EF 45-50%, no vegetations  - c/w ceftaroline q8  - switched vancomycin to daptomycin by ID, recs appreciated  - BCx 10/6 +MRSA, BCx 10/9 gm positive clusters  - F/U repeat BCx 10/11  - concern for endocarditis, but not a candidate for valve replacements  - CT chest, abd/pelvis 10/11: b/l pleural effusions with lower lobe atelectatic changes, parenchymal infiltrate/consolidation, interlobular septal thickening. Renal calculi, horshoe kidney, suspected bladder wall thickening.  - considering ERENDIRA for endocarditis; cardio on board, pending discussion with HCP Surekha    #ESRD  - HD M/W/F  - HD today 10/11  - Sfoía placed in ICU 10/4  - Nephro consult appreciated  - daily weights    Anemia of ESRD/AOCD  -H/H stable  -FOBT negative  -Epo per nephrology   -Monitor H/H    Thrombocytopenia (Improved)  -Heme following  -Likely secondary to sepsis  -Monitor counts    #Afib  - CHADsVASc 5  - rate controlled  - h/o CAD s/p PCI, s/p MVR  - c/w Eliquis 5mg bid  - c/w ASA    #Hypothyroidism  - TSH elevated  - synthroid dose increased to 150mcg  - F/U outpatient to titrate as needed    #h/o hemorrhoids, proctitis  - c/w hydrocortisone suppository, mesalamine  - c/w bowel regimen    #DVT ppx  - Eliquis 5mg bid    #GI ppx  - c/w pantoprazole 40mg qd    Full code. Palliative on board. Patient is a 78M h/o ESRD, Afib on Eliquis, CAD s/p PCI, prosthetic s/p MVR, h/o proctitis, hemorrhoids, hypothyroidism was in ICU for metabolic encephalopathy, septic shock 2/2 MRSA bacteremia s/p tunnel cath removal 10/3, IJ Shiley placed 10/4, downgraded to 3E for further management.    #Septic shock 2/2 MRSA bacteremia  #Metabolic encephalopathy  - awake, AOx0-1  - CTH 10/4 was negative for acute intracranial pathology  - plan to wean off midodrine as tolerated  - maintain O2 sat >94%  - speech and swallow eval: puree with thin liquids  - Aspiration precaution     #MRSA bacteremia  - permacath removed 10/3, shiley placed 10/4  - TTE 10/1/23 EF 45-50%, no vegetations  - c/w ceftaroline q8  - switched vancomycin to daptomycin by ID, recs appreciated  - BCx 10/6 +MRSA, BCx 10/9 gm positive clusters  - F/U repeat BCx 10/11  - concern for endocarditis, but not a candidate for valve replacements  - CT chest, abd/pelvis 10/11: b/l pleural effusions with lower lobe atelectatic changes, parenchymal infiltrate/consolidation, interlobular septal thickening. Renal calculi, horshoe kidney, suspected bladder wall thickening.  - considering ERENDIRA for endocarditis; cardio on board, pending discussion with HCP Surekha    #ESRD  - HD M/W/F  - HD today 10/11  - Sofía placed in ICU 10/4  - Nephro consult appreciated  - daily weights    Anemia of ESRD/AOCD  -H/H stable  -FOBT negative  -Epo per nephrology   -Monitor H/H    Thrombocytopenia (Improved)  -Heme following  -Likely secondary to sepsis  -Monitor counts    #Afib  - CHADsVASc 5  - rate controlled  - h/o CAD s/p PCI, s/p MVR  - c/w Eliquis 5mg bid  - c/w ASA    #Hypothyroidism  - TSH elevated  - synthroid dose increased to 150mcg  - F/U outpatient to titrate as needed    #h/o hemorrhoids, proctitis  - c/w hydrocortisone suppository, mesalamine  - c/w bowel regimen    #DVT ppx  - Eliquis 5mg bid    #GI ppx  - c/w pantoprazole 40mg qd    Full code. Palliative on board.

## 2023-10-11 NOTE — PROGRESS NOTE ADULT - SUBJECTIVE AND OBJECTIVE BOX
JANEE ARCEO  [id number]      Cardiology re-consulted to evaluate for ERENDIRA    Chief Complaint: Septic shock/MRSA bacteremia    Interval History: The patient remains altered this morning, has no concerns.     Tele: not on telemetry       Current meds:   acetaminophen     Tablet .. 650 milliGRAM(s) Oral every 6 hours PRN  aluminum hydroxide/magnesium hydroxide/simethicone Suspension 10 milliLiter(s) Oral every 6 hours PRN  apixaban 5 milliGRAM(s) Oral two times a day  aspirin  chewable 81 milliGRAM(s) Oral daily  ceftaroline fosamil IVPB      ceftaroline fosamil IVPB 200 milliGRAM(s) IV Intermittent every 8 hours  chlorhexidine 2% Cloths 1 Application(s) Topical <User Schedule>  DAPTOmycin IVPB 600 milliGRAM(s) IV Intermittent every 48 hours  epoetin val (PROCRIT) Injectable 71861 Unit(s) IV Push <User Schedule>  folic acid 1 milliGRAM(s) Oral daily  hydrocortisone hemorrhoidal Suppository 1 Suppository(s) Rectal two times a day  influenza  Vaccine (HIGH DOSE) 0.7 milliLiter(s) IntraMuscular once  levothyroxine 150 MICROGram(s) Oral daily  mesalamine Suppository 1000 milliGRAM(s) Rectal at bedtime  midodrine 10 milliGRAM(s) Oral every 8 hours  pantoprazole   Suspension 40 milliGRAM(s) Enteral Tube daily  polyethylene glycol 3350 17 Gram(s) Oral daily  senna 2 Tablet(s) Oral at bedtime  sodium chloride 0.9% lock flush 10 milliLiter(s) IV Push every 1 hour PRN      Objective:     Vital Signs:   T(C): 36.5 (10-11-23 @ 05:07), Max: 36.9 (10-10-23 @ 12:24)  HR: 90 (10-11-23 @ 05:07) (87 - 99)  BP: 106/72 (10-11-23 @ 05:07) (103/68 - 114/59)  RR: 18 (10-11-23 @ 05:07) (18 - 18)  SpO2: 95% (10-11-23 @ 05:07) (94% - 98%)  Wt(kg): --      Physical Exam:   General: elderly man, altered, chronically ill appearing  HEENT: sclera anicteric  Neck: supple  CVS: JVP ~ 7 cm H20, irregularly irregular, s1, s2  Chest: decreased breath sounds  Abdomen: non-distended  Extremities: no lower extremity edema b/l  Neuro: minimally awake      Labs:   11 Oct 2023 05:26    134    |  98     |  66     ----------------------------<  99     4.5     |  28     |  4.68     Ca    9.3        11 Oct 2023 05:26    TPro  6.4    /  Alb  2.0    /  TBili  0.8    /  DBili  x      /  AST  14     /  ALT  15     /  AlkPhos  216    11 Oct 2023 05:26                          7.7    16.65 )-----------( 126      ( 11 Oct 2023 05:26 )             23.1       CARDIAC MARKERS ( 11 Oct 2023 05:26 )  x     / x     / <7 U/L / x     / x            Coronary angiogram (7/2022):  LM: mild atherosclerosis  LAD: mid 50% stenosis  D1: 60% stenosis  D2: 90% stenosis  LCx: 95% stenosis  RCA: mild atherosclerosis   LIMA-D2 graft: no disease  SVG-OM1: no disease    ERENDIRA (3/2023):  LVEF 25-30%  Reduced RV systolic function   Moderate AR, Moderate-severe TR, Bio-MVR  No obvious vegetations visualized  No pericardial effusion seen.     TTE (8/2023):  LVEF 53%  Mildly enlarged RV size with reduced systolic function  Severe biatrial enlargement  Mild-moderate TR, Mild-moderate AR  Bio-MVR  No obvious evidence of endocarditis. Consider ERENDIRA if clinically indicated.    TTE (10/1/23):   1. 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 Biatrial enlargement   4. Left ventricular ejection fraction, by visual estimation, is 45 to 50%.   5. Mild mitral annular calcification.   6. Mild-moderate tricuspid regurgitation.   7. Mild aortic valve stenosis.      ECG (9/30/23): atrial fibrillation, PVC  ECG (10/3/23): atrial fibrillation, old inferior infarct, ventricular couplet   JANEE ARCEO  [id number]      Cardiology re-consulted to evaluate for ERENDIRA    Chief Complaint: Septic shock/MRSA bacteremia    Interval History: The patient remains altered this morning, has no concerns.     Tele: not on telemetry       Current meds:   acetaminophen     Tablet .. 650 milliGRAM(s) Oral every 6 hours PRN  aluminum hydroxide/magnesium hydroxide/simethicone Suspension 10 milliLiter(s) Oral every 6 hours PRN  apixaban 5 milliGRAM(s) Oral two times a day  aspirin  chewable 81 milliGRAM(s) Oral daily  ceftaroline fosamil IVPB      ceftaroline fosamil IVPB 200 milliGRAM(s) IV Intermittent every 8 hours  chlorhexidine 2% Cloths 1 Application(s) Topical <User Schedule>  DAPTOmycin IVPB 600 milliGRAM(s) IV Intermittent every 48 hours  epoetin val (PROCRIT) Injectable 23930 Unit(s) IV Push <User Schedule>  folic acid 1 milliGRAM(s) Oral daily  hydrocortisone hemorrhoidal Suppository 1 Suppository(s) Rectal two times a day  influenza  Vaccine (HIGH DOSE) 0.7 milliLiter(s) IntraMuscular once  levothyroxine 150 MICROGram(s) Oral daily  mesalamine Suppository 1000 milliGRAM(s) Rectal at bedtime  midodrine 10 milliGRAM(s) Oral every 8 hours  pantoprazole   Suspension 40 milliGRAM(s) Enteral Tube daily  polyethylene glycol 3350 17 Gram(s) Oral daily  senna 2 Tablet(s) Oral at bedtime  sodium chloride 0.9% lock flush 10 milliLiter(s) IV Push every 1 hour PRN      Objective:     Vital Signs:   T(C): 36.5 (10-11-23 @ 05:07), Max: 36.9 (10-10-23 @ 12:24)  HR: 90 (10-11-23 @ 05:07) (87 - 99)  BP: 106/72 (10-11-23 @ 05:07) (103/68 - 114/59)  RR: 18 (10-11-23 @ 05:07) (18 - 18)  SpO2: 95% (10-11-23 @ 05:07) (94% - 98%)  Wt(kg): --      Physical Exam:   General: elderly man, altered, chronically ill appearing  HEENT: sclera anicteric  Neck: supple  CVS: JVP ~ 7 cm H20, irregularly irregular, s1, s2  Chest: decreased breath sounds  Abdomen: non-distended  Extremities: no lower extremity edema b/l  Neuro: minimally awake      Labs:   11 Oct 2023 05:26    134    |  98     |  66     ----------------------------<  99     4.5     |  28     |  4.68     Ca    9.3        11 Oct 2023 05:26    TPro  6.4    /  Alb  2.0    /  TBili  0.8    /  DBili  x      /  AST  14     /  ALT  15     /  AlkPhos  216    11 Oct 2023 05:26                          7.7    16.65 )-----------( 126      ( 11 Oct 2023 05:26 )             23.1       CARDIAC MARKERS ( 11 Oct 2023 05:26 )  x     / x     / <7 U/L / x     / x            Coronary angiogram (7/2022):  LM: mild atherosclerosis  LAD: mid 50% stenosis  D1: 60% stenosis  D2: 90% stenosis  LCx: 95% stenosis  RCA: mild atherosclerosis   LIMA-D2 graft: no disease  SVG-OM1: no disease    ERENDIRA (3/2023):  LVEF 25-30%  Reduced RV systolic function   Moderate AR, Moderate-severe TR, Bio-MVR  No obvious vegetations visualized  No pericardial effusion seen.     TTE (8/2023):  LVEF 53%  Mildly enlarged RV size with reduced systolic function  Severe biatrial enlargement  Mild-moderate TR, Mild-moderate AR  Bio-MVR  No obvious evidence of endocarditis. Consider ERENDIRA if clinically indicated.    TTE (10/1/23):   1. 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 Biatrial enlargement   4. Left ventricular ejection fraction, by visual estimation, is 45 to 50%.   5. Mild mitral annular calcification.   6. Mild-moderate tricuspid regurgitation.   7. Mild aortic valve stenosis.      ECG (9/30/23): atrial fibrillation, PVC  ECG (10/3/23): atrial fibrillation, old inferior infarct, ventricular couplet   JANEE ARCEO  [id number]      Cardiology re-consulted to evaluate for ERENDIRA    Chief Complaint: Septic shock/MRSA bacteremia    Interval History: The patient remains altered this morning, has no concerns.     Tele: not on telemetry       Current meds:   acetaminophen     Tablet .. 650 milliGRAM(s) Oral every 6 hours PRN  aluminum hydroxide/magnesium hydroxide/simethicone Suspension 10 milliLiter(s) Oral every 6 hours PRN  apixaban 5 milliGRAM(s) Oral two times a day  aspirin  chewable 81 milliGRAM(s) Oral daily  ceftaroline fosamil IVPB      ceftaroline fosamil IVPB 200 milliGRAM(s) IV Intermittent every 8 hours  chlorhexidine 2% Cloths 1 Application(s) Topical <User Schedule>  DAPTOmycin IVPB 600 milliGRAM(s) IV Intermittent every 48 hours  epoetin val (PROCRIT) Injectable 45101 Unit(s) IV Push <User Schedule>  folic acid 1 milliGRAM(s) Oral daily  hydrocortisone hemorrhoidal Suppository 1 Suppository(s) Rectal two times a day  influenza  Vaccine (HIGH DOSE) 0.7 milliLiter(s) IntraMuscular once  levothyroxine 150 MICROGram(s) Oral daily  mesalamine Suppository 1000 milliGRAM(s) Rectal at bedtime  midodrine 10 milliGRAM(s) Oral every 8 hours  pantoprazole   Suspension 40 milliGRAM(s) Enteral Tube daily  polyethylene glycol 3350 17 Gram(s) Oral daily  senna 2 Tablet(s) Oral at bedtime  sodium chloride 0.9% lock flush 10 milliLiter(s) IV Push every 1 hour PRN      Objective:     Vital Signs:   T(C): 36.5 (10-11-23 @ 05:07), Max: 36.9 (10-10-23 @ 12:24)  HR: 90 (10-11-23 @ 05:07) (87 - 99)  BP: 106/72 (10-11-23 @ 05:07) (103/68 - 114/59)  RR: 18 (10-11-23 @ 05:07) (18 - 18)  SpO2: 95% (10-11-23 @ 05:07) (94% - 98%)  Wt(kg): --      Physical Exam:   General: elderly man, altered, chronically ill appearing  HEENT: sclera anicteric  Neck: supple  CVS: JVP ~ 7 cm H20, irregularly irregular, s1, s2  Chest: decreased breath sounds  Abdomen: non-distended  Extremities: no lower extremity edema b/l  Neuro: minimally awake      Labs:   11 Oct 2023 05:26    134    |  98     |  66     ----------------------------<  99     4.5     |  28     |  4.68     Ca    9.3        11 Oct 2023 05:26    TPro  6.4    /  Alb  2.0    /  TBili  0.8    /  DBili  x      /  AST  14     /  ALT  15     /  AlkPhos  216    11 Oct 2023 05:26                          7.7    16.65 )-----------( 126      ( 11 Oct 2023 05:26 )             23.1       CARDIAC MARKERS ( 11 Oct 2023 05:26 )  x     / x     / <7 U/L / x     / x            Coronary angiogram (7/2022):  LM: mild atherosclerosis  LAD: mid 50% stenosis  D1: 60% stenosis  D2: 90% stenosis  LCx: 95% stenosis  RCA: mild atherosclerosis   LIMA-D2 graft: no disease  SVG-OM1: no disease    ERENDIRA (3/2023):  LVEF 25-30%  Reduced RV systolic function   Moderate AR, Moderate-severe TR, Bio-MVR  No obvious vegetations visualized  No pericardial effusion seen.     TTE (8/2023):  LVEF 53%  Mildly enlarged RV size with reduced systolic function  Severe biatrial enlargement  Mild-moderate TR, Mild-moderate AR  Bio-MVR  No obvious evidence of endocarditis. Consider ERENDIRA if clinically indicated.    TTE (10/1/23):   1. 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 Biatrial enlargement   4. Left ventricular ejection fraction, by visual estimation, is 45 to 50%.   5. Mild mitral annular calcification.   6. Mild-moderate tricuspid regurgitation.   7. Mild aortic valve stenosis.      ECG (9/30/23): atrial fibrillation, PVC  ECG (10/3/23): atrial fibrillation, old inferior infarct, ventricular couplet

## 2023-10-11 NOTE — PROGRESS NOTE ADULT - PROBLEM SELECTOR PLAN 1
Thrombocytopenia related to recent septic shock/MRSA bacteremia.  Continues antibiotic coverage.  As infectious process under control, platelet count improving–today at 126,000.  No evidence of underlying bone marrow disease.  Favor expectant hematologic surveillance.

## 2023-10-11 NOTE — PROGRESS NOTE ADULT - ATTENDING COMMENTS
Patient seen and examined at bedside. No acute overnight events.  Patient is AAO x 1, poor historian. No acute complaints offered.  CT chest/abd/pelvis results reviewed, no obvious source of infection noted.  Repeat echo 10/11 showed  an small irregular, partially mobile echodensity visualized on the posterior mitral valve   annulus which may be calcification, however cannot rule out small valvular vegetation of the posterior mitral annulus. There is a thin   filamentous echodensity which appears attached to the mitral valve apparatus.   Blood culture from 10/9 is growing MRSA. Repeat blood culture sent 10/11.   ID on board. Antibiotic switched to daptomycin yesterday. He is continued on ceftaroline.    Spoke with patient's HCP (Surekha) over the phone and updated her on above. Surekha states that she will speak with patient's cardiologist tomorrow (Dr. Lainez) and she is leaning toward DNR/DNI but will not make that decision right now.  Palliative care following for goals of care.

## 2023-10-11 NOTE — PROGRESS NOTE ADULT - REASON FOR ADMISSION
Sepsis Rotation Flap Text: The defect edges were debeveled with a #15 scalpel blade.  Given the location of the defect, shape of the defect and the proximity to free margins a rotation flap was deemed most appropriate.  Using a sterile surgical marker, an appropriate rotation flap was drawn incorporating the defect and placing the expected incisions within the relaxed skin tension lines where possible.    The area thus outlined was incised deep to adipose tissue with a #15 scalpel blade.  The skin margins were undermined to an appropriate distance in all directions utilizing iris scissors.

## 2023-10-11 NOTE — PROGRESS NOTE ADULT - SUBJECTIVE AND OBJECTIVE BOX
Resting    Vital Signs Last 24 Hrs  T(C): 36.5 (10-11-23 @ 13:26), Max: 36.5 (10-11-23 @ 05:07)  T(F): 97.7 (10-11-23 @ 13:26), Max: 97.7 (10-11-23 @ 05:07)  HR: 78 (10-11-23 @ 13:26) (78 - 90)  BP: 99/46 (10-11-23 @ 13:26) (99/46 - 114/59)  RR: 18 (10-11-23 @ 13:26) (18 - 18)  SpO2: 95% (10-11-23 @ 13:26) (94% - 98%)    I&O's Detail    11 Oct 2023 07:01  -  11 Oct 2023 16:34  --------------------------------------------------------  OUT:    Other (mL): 1100 mL  Total OUT: 1100 mL    s1s2  b/l air entry  soft, ND  sm edema, LUE AVF + bruit                                                                            7.7    16.65 )-----------( 126      ( 11 Oct 2023 05:26 )             23.1     11 Oct 2023 05:26    134    |  98     |  66     ----------------------------<  99     4.5     |  28     |  4.68     Ca    9.3        11 Oct 2023 05:26    TPro  6.4    /  Alb  2.0    /  TBili  0.8    /  DBili  x      /  AST  14     /  ALT  15     /  AlkPhos  216    11 Oct 2023 05:26    LIVER FUNCTIONS - ( 11 Oct 2023 05:26 )  Alb: 2.0 g/dL / Pro: 6.4 g/dL / ALK PHOS: 216 U/L / ALT: 15 U/L / AST: 14 U/L / GGT: x           Culture - Blood (collected 09 Oct 2023 07:00)  Source: .Blood Blood  Gram Stain (10 Oct 2023 09:30):    Growth in aerobic bottle: Gram Positive Cocci in Clusters    Growth in anaerobic bottle: Gram Positive Cocci in Clusters  Final Report (11 Oct 2023 14:50):    Growth in aerobic and anaerobic bottles: Methicillin Resistant    Staphylococcus aureus  Organism: Methicillin resistant Staphylococcus aureus (11 Oct 2023 14:50)  Organism: Methicillin resistant Staphylococcus aureus (11 Oct 2023 14:50)    Culture - Blood (collected 09 Oct 2023 07:00)  Source: .Blood Blood  Gram Stain (10 Oct 2023 06:14):    Growth in aerobic bottle: Gram Positive Cocci in Clusters    Growth in anaerobic bottle: Gram Positive Cocci in Clusters  Final Report (11 Oct 2023 12:08):    Growth in aerobic and anaerobic bottles: Methicillin Resistant    Staphylococcus aureus    See previous culture [id number]    acetaminophen     Tablet .. 650 milliGRAM(s) Oral every 6 hours PRN  aluminum hydroxide/magnesium hydroxide/simethicone Suspension 10 milliLiter(s) Oral every 6 hours PRN  apixaban 5 milliGRAM(s) Oral two times a day  aspirin  chewable 81 milliGRAM(s) Oral daily  ceftaroline fosamil IVPB      ceftaroline fosamil IVPB 200 milliGRAM(s) IV Intermittent every 8 hours  chlorhexidine 2% Cloths 1 Application(s) Topical <User Schedule>  DAPTOmycin IVPB 600 milliGRAM(s) IV Intermittent every 48 hours  epoetin val (PROCRIT) Injectable 49182 Unit(s) IV Push <User Schedule>  folic acid 1 milliGRAM(s) Oral daily  hydrocortisone hemorrhoidal Suppository 1 Suppository(s) Rectal two times a day  influenza  Vaccine (HIGH DOSE) 0.7 milliLiter(s) IntraMuscular once  levothyroxine 150 MICROGram(s) Oral daily  mesalamine Suppository 1000 milliGRAM(s) Rectal at bedtime  midodrine 10 milliGRAM(s) Oral every 8 hours  pantoprazole   Suspension 40 milliGRAM(s) Enteral Tube daily  polyethylene glycol 3350 17 Gram(s) Oral daily  senna 2 Tablet(s) Oral at bedtime  sodium chloride 0.9% lock flush 10 milliLiter(s) IV Push every 1 hour PRN    A/P:    ESRD on HD at [facility] MWF via perm cath  Hx Afib, CM, EF 45 - 50%  Has LUE AVF, not yet mature   Adm 9/30/23 w/MRSA bacteremia, persistent   Abx per ID  Perm cath d/c-d 10/2/23  Cath cx pos for MRSA  ERENDIRA if c/w goc   HD MWF via temp IJ HD cath (placed 10/4/23)  Fluid removal w/HD as tolerates   Prognosis is guarded overall    [phone] Resting    Vital Signs Last 24 Hrs  T(C): 36.5 (10-11-23 @ 13:26), Max: 36.5 (10-11-23 @ 05:07)  T(F): 97.7 (10-11-23 @ 13:26), Max: 97.7 (10-11-23 @ 05:07)  HR: 78 (10-11-23 @ 13:26) (78 - 90)  BP: 99/46 (10-11-23 @ 13:26) (99/46 - 114/59)  RR: 18 (10-11-23 @ 13:26) (18 - 18)  SpO2: 95% (10-11-23 @ 13:26) (94% - 98%)    I&O's Detail    11 Oct 2023 07:01  -  11 Oct 2023 16:34  --------------------------------------------------------  OUT:    Other (mL): 1100 mL  Total OUT: 1100 mL    s1s2  b/l air entry  soft, ND  sm edema, LUE AVF + bruit                                                                            7.7    16.65 )-----------( 126      ( 11 Oct 2023 05:26 )             23.1     11 Oct 2023 05:26    134    |  98     |  66     ----------------------------<  99     4.5     |  28     |  4.68     Ca    9.3        11 Oct 2023 05:26    TPro  6.4    /  Alb  2.0    /  TBili  0.8    /  DBili  x      /  AST  14     /  ALT  15     /  AlkPhos  216    11 Oct 2023 05:26    LIVER FUNCTIONS - ( 11 Oct 2023 05:26 )  Alb: 2.0 g/dL / Pro: 6.4 g/dL / ALK PHOS: 216 U/L / ALT: 15 U/L / AST: 14 U/L / GGT: x           Culture - Blood (collected 09 Oct 2023 07:00)  Source: .Blood Blood  Gram Stain (10 Oct 2023 09:30):    Growth in aerobic bottle: Gram Positive Cocci in Clusters    Growth in anaerobic bottle: Gram Positive Cocci in Clusters  Final Report (11 Oct 2023 14:50):    Growth in aerobic and anaerobic bottles: Methicillin Resistant    Staphylococcus aureus  Organism: Methicillin resistant Staphylococcus aureus (11 Oct 2023 14:50)  Organism: Methicillin resistant Staphylococcus aureus (11 Oct 2023 14:50)    Culture - Blood (collected 09 Oct 2023 07:00)  Source: .Blood Blood  Gram Stain (10 Oct 2023 06:14):    Growth in aerobic bottle: Gram Positive Cocci in Clusters    Growth in anaerobic bottle: Gram Positive Cocci in Clusters  Final Report (11 Oct 2023 12:08):    Growth in aerobic and anaerobic bottles: Methicillin Resistant    Staphylococcus aureus    See previous culture [id number]    acetaminophen     Tablet .. 650 milliGRAM(s) Oral every 6 hours PRN  aluminum hydroxide/magnesium hydroxide/simethicone Suspension 10 milliLiter(s) Oral every 6 hours PRN  apixaban 5 milliGRAM(s) Oral two times a day  aspirin  chewable 81 milliGRAM(s) Oral daily  ceftaroline fosamil IVPB      ceftaroline fosamil IVPB 200 milliGRAM(s) IV Intermittent every 8 hours  chlorhexidine 2% Cloths 1 Application(s) Topical <User Schedule>  DAPTOmycin IVPB 600 milliGRAM(s) IV Intermittent every 48 hours  epoetin val (PROCRIT) Injectable 28603 Unit(s) IV Push <User Schedule>  folic acid 1 milliGRAM(s) Oral daily  hydrocortisone hemorrhoidal Suppository 1 Suppository(s) Rectal two times a day  influenza  Vaccine (HIGH DOSE) 0.7 milliLiter(s) IntraMuscular once  levothyroxine 150 MICROGram(s) Oral daily  mesalamine Suppository 1000 milliGRAM(s) Rectal at bedtime  midodrine 10 milliGRAM(s) Oral every 8 hours  pantoprazole   Suspension 40 milliGRAM(s) Enteral Tube daily  polyethylene glycol 3350 17 Gram(s) Oral daily  senna 2 Tablet(s) Oral at bedtime  sodium chloride 0.9% lock flush 10 milliLiter(s) IV Push every 1 hour PRN    A/P:    ESRD on HD at [facility] MWF via perm cath  Hx Afib, CM, EF 45 - 50%  Has LUE AVF, not yet mature   Adm 9/30/23 w/MRSA bacteremia, persistent   Abx per ID  Perm cath d/c-d 10/2/23  Cath cx pos for MRSA  ERENDIRA if c/w goc   HD MWF via temp IJ HD cath (placed 10/4/23)  Fluid removal w/HD as tolerates   Prognosis is guarded overall    [phone] Resting    Vital Signs Last 24 Hrs  T(C): 36.5 (10-11-23 @ 13:26), Max: 36.5 (10-11-23 @ 05:07)  T(F): 97.7 (10-11-23 @ 13:26), Max: 97.7 (10-11-23 @ 05:07)  HR: 78 (10-11-23 @ 13:26) (78 - 90)  BP: 99/46 (10-11-23 @ 13:26) (99/46 - 114/59)  RR: 18 (10-11-23 @ 13:26) (18 - 18)  SpO2: 95% (10-11-23 @ 13:26) (94% - 98%)    I&O's Detail    11 Oct 2023 07:01  -  11 Oct 2023 16:34  --------------------------------------------------------  OUT:    Other (mL): 1100 mL  Total OUT: 1100 mL    s1s2  b/l air entry  soft, ND  sm edema, LUE AVF + bruit                                                                            7.7    16.65 )-----------( 126      ( 11 Oct 2023 05:26 )             23.1     11 Oct 2023 05:26    134    |  98     |  66     ----------------------------<  99     4.5     |  28     |  4.68     Ca    9.3        11 Oct 2023 05:26    TPro  6.4    /  Alb  2.0    /  TBili  0.8    /  DBili  x      /  AST  14     /  ALT  15     /  AlkPhos  216    11 Oct 2023 05:26    LIVER FUNCTIONS - ( 11 Oct 2023 05:26 )  Alb: 2.0 g/dL / Pro: 6.4 g/dL / ALK PHOS: 216 U/L / ALT: 15 U/L / AST: 14 U/L / GGT: x           Culture - Blood (collected 09 Oct 2023 07:00)  Source: .Blood Blood  Gram Stain (10 Oct 2023 09:30):    Growth in aerobic bottle: Gram Positive Cocci in Clusters    Growth in anaerobic bottle: Gram Positive Cocci in Clusters  Final Report (11 Oct 2023 14:50):    Growth in aerobic and anaerobic bottles: Methicillin Resistant    Staphylococcus aureus  Organism: Methicillin resistant Staphylococcus aureus (11 Oct 2023 14:50)  Organism: Methicillin resistant Staphylococcus aureus (11 Oct 2023 14:50)    Culture - Blood (collected 09 Oct 2023 07:00)  Source: .Blood Blood  Gram Stain (10 Oct 2023 06:14):    Growth in aerobic bottle: Gram Positive Cocci in Clusters    Growth in anaerobic bottle: Gram Positive Cocci in Clusters  Final Report (11 Oct 2023 12:08):    Growth in aerobic and anaerobic bottles: Methicillin Resistant    Staphylococcus aureus    See previous culture [id number]    acetaminophen     Tablet .. 650 milliGRAM(s) Oral every 6 hours PRN  aluminum hydroxide/magnesium hydroxide/simethicone Suspension 10 milliLiter(s) Oral every 6 hours PRN  apixaban 5 milliGRAM(s) Oral two times a day  aspirin  chewable 81 milliGRAM(s) Oral daily  ceftaroline fosamil IVPB      ceftaroline fosamil IVPB 200 milliGRAM(s) IV Intermittent every 8 hours  chlorhexidine 2% Cloths 1 Application(s) Topical <User Schedule>  DAPTOmycin IVPB 600 milliGRAM(s) IV Intermittent every 48 hours  epoetin val (PROCRIT) Injectable 39772 Unit(s) IV Push <User Schedule>  folic acid 1 milliGRAM(s) Oral daily  hydrocortisone hemorrhoidal Suppository 1 Suppository(s) Rectal two times a day  influenza  Vaccine (HIGH DOSE) 0.7 milliLiter(s) IntraMuscular once  levothyroxine 150 MICROGram(s) Oral daily  mesalamine Suppository 1000 milliGRAM(s) Rectal at bedtime  midodrine 10 milliGRAM(s) Oral every 8 hours  pantoprazole   Suspension 40 milliGRAM(s) Enteral Tube daily  polyethylene glycol 3350 17 Gram(s) Oral daily  senna 2 Tablet(s) Oral at bedtime  sodium chloride 0.9% lock flush 10 milliLiter(s) IV Push every 1 hour PRN    A/P:    ESRD on HD at [facility] MWF via perm cath  Hx Afib, CM, EF 45 - 50%  Has LUE AVF, not yet mature   Adm 9/30/23 w/MRSA bacteremia, persistent   Abx per ID  Perm cath d/c-d 10/2/23  Cath cx pos for MRSA  ERENDIRA if c/w goc   HD MWF via temp IJ HD cath (placed 10/4/23)  Fluid removal w/HD as tolerates   Prognosis is guarded overall    [phone]

## 2023-10-12 LAB
ALBUMIN SERPL ELPH-MCNC: 1.9 G/DL — LOW (ref 3.3–5)
ALP SERPL-CCNC: 203 U/L — HIGH (ref 40–120)
ALT FLD-CCNC: 11 U/L — SIGNIFICANT CHANGE UP (ref 10–45)
ANION GAP SERPL CALC-SCNC: 8 MMOL/L — SIGNIFICANT CHANGE UP (ref 5–17)
AST SERPL-CCNC: 16 U/L — SIGNIFICANT CHANGE UP (ref 10–40)
BASOPHILS # BLD AUTO: 0.08 K/UL — SIGNIFICANT CHANGE UP (ref 0–0.2)
BASOPHILS NFR BLD AUTO: 0.5 % — SIGNIFICANT CHANGE UP (ref 0–2)
BILIRUB SERPL-MCNC: 0.9 MG/DL — SIGNIFICANT CHANGE UP (ref 0.2–1.2)
BUN SERPL-MCNC: 42 MG/DL — HIGH (ref 7–23)
CALCIUM SERPL-MCNC: 9.1 MG/DL — SIGNIFICANT CHANGE UP (ref 8.4–10.5)
CHLORIDE SERPL-SCNC: 100 MMOL/L — SIGNIFICANT CHANGE UP (ref 96–108)
CO2 SERPL-SCNC: 28 MMOL/L — SIGNIFICANT CHANGE UP (ref 22–31)
CREAT SERPL-MCNC: 3.42 MG/DL — HIGH (ref 0.5–1.3)
EGFR: 18 ML/MIN/1.73M2 — LOW
EOSINOPHIL # BLD AUTO: 0.09 K/UL — SIGNIFICANT CHANGE UP (ref 0–0.5)
EOSINOPHIL NFR BLD AUTO: 0.6 % — SIGNIFICANT CHANGE UP (ref 0–6)
GLUCOSE SERPL-MCNC: 102 MG/DL — HIGH (ref 70–99)
GRAM STN FLD: SIGNIFICANT CHANGE UP
HCT VFR BLD CALC: 22.1 % — LOW (ref 39–50)
HGB BLD-MCNC: 7.4 G/DL — LOW (ref 13–17)
IMM GRANULOCYTES NFR BLD AUTO: 0.6 % — SIGNIFICANT CHANGE UP (ref 0–0.9)
LYMPHOCYTES # BLD AUTO: 1.23 K/UL — SIGNIFICANT CHANGE UP (ref 1–3.3)
LYMPHOCYTES # BLD AUTO: 7.6 % — LOW (ref 13–44)
MAGNESIUM SERPL-MCNC: 1.7 MG/DL — SIGNIFICANT CHANGE UP (ref 1.6–2.6)
MCHC RBC-ENTMCNC: 31 PG — SIGNIFICANT CHANGE UP (ref 27–34)
MCHC RBC-ENTMCNC: 33.5 GM/DL — SIGNIFICANT CHANGE UP (ref 32–36)
MCV RBC AUTO: 92.5 FL — SIGNIFICANT CHANGE UP (ref 80–100)
MONOCYTES # BLD AUTO: 1.4 K/UL — HIGH (ref 0–0.9)
MONOCYTES NFR BLD AUTO: 8.7 % — SIGNIFICANT CHANGE UP (ref 2–14)
NEUTROPHILS # BLD AUTO: 13.22 K/UL — HIGH (ref 1.8–7.4)
NEUTROPHILS NFR BLD AUTO: 82 % — HIGH (ref 43–77)
NRBC # BLD: 0 /100 WBCS — SIGNIFICANT CHANGE UP (ref 0–0)
PHOSPHATE SERPL-MCNC: 3.8 MG/DL — SIGNIFICANT CHANGE UP (ref 2.5–4.5)
PLATELET # BLD AUTO: 140 K/UL — LOW (ref 150–400)
POTASSIUM SERPL-MCNC: 3.8 MMOL/L — SIGNIFICANT CHANGE UP (ref 3.5–5.3)
POTASSIUM SERPL-SCNC: 3.8 MMOL/L — SIGNIFICANT CHANGE UP (ref 3.5–5.3)
PROT SERPL-MCNC: 5.8 G/DL — LOW (ref 6–8.3)
RBC # BLD: 2.39 M/UL — LOW (ref 4.2–5.8)
RBC # FLD: 16.1 % — HIGH (ref 10.3–14.5)
SODIUM SERPL-SCNC: 136 MMOL/L — SIGNIFICANT CHANGE UP (ref 135–145)
SPECIMEN SOURCE: SIGNIFICANT CHANGE UP
WBC # BLD: 16.12 K/UL — HIGH (ref 3.8–10.5)
WBC # FLD AUTO: 16.12 K/UL — HIGH (ref 3.8–10.5)

## 2023-10-12 PROCEDURE — 99232 SBSQ HOSP IP/OBS MODERATE 35: CPT

## 2023-10-12 PROCEDURE — 99497 ADVNCD CARE PLAN 30 MIN: CPT

## 2023-10-12 RX ADMIN — CEFTAROLINE FOSAMIL 50 MILLIGRAM(S): 600 POWDER, FOR SOLUTION INTRAVENOUS at 21:58

## 2023-10-12 RX ADMIN — Medication 1 MILLIGRAM(S): at 13:51

## 2023-10-12 RX ADMIN — Medication 150 MICROGRAM(S): at 05:05

## 2023-10-12 RX ADMIN — MIDODRINE HYDROCHLORIDE 10 MILLIGRAM(S): 2.5 TABLET ORAL at 05:12

## 2023-10-12 RX ADMIN — MIDODRINE HYDROCHLORIDE 10 MILLIGRAM(S): 2.5 TABLET ORAL at 21:59

## 2023-10-12 RX ADMIN — MIDODRINE HYDROCHLORIDE 10 MILLIGRAM(S): 2.5 TABLET ORAL at 13:55

## 2023-10-12 RX ADMIN — CEFTAROLINE FOSAMIL 50 MILLIGRAM(S): 600 POWDER, FOR SOLUTION INTRAVENOUS at 14:07

## 2023-10-12 RX ADMIN — APIXABAN 5 MILLIGRAM(S): 2.5 TABLET, FILM COATED ORAL at 18:12

## 2023-10-12 RX ADMIN — DAPTOMYCIN 124 MILLIGRAM(S): 500 INJECTION, POWDER, LYOPHILIZED, FOR SOLUTION INTRAVENOUS at 18:45

## 2023-10-12 RX ADMIN — PANTOPRAZOLE SODIUM 40 MILLIGRAM(S): 20 TABLET, DELAYED RELEASE ORAL at 13:53

## 2023-10-12 RX ADMIN — POLYETHYLENE GLYCOL 3350 17 GRAM(S): 17 POWDER, FOR SOLUTION ORAL at 13:53

## 2023-10-12 RX ADMIN — APIXABAN 5 MILLIGRAM(S): 2.5 TABLET, FILM COATED ORAL at 05:05

## 2023-10-12 RX ADMIN — CEFTAROLINE FOSAMIL 50 MILLIGRAM(S): 600 POWDER, FOR SOLUTION INTRAVENOUS at 05:05

## 2023-10-12 RX ADMIN — Medication 1 SUPPOSITORY(S): at 18:12

## 2023-10-12 RX ADMIN — Medication 1 SUPPOSITORY(S): at 06:01

## 2023-10-12 RX ADMIN — SENNA PLUS 2 TABLET(S): 8.6 TABLET ORAL at 21:59

## 2023-10-12 RX ADMIN — CHLORHEXIDINE GLUCONATE 1 APPLICATION(S): 213 SOLUTION TOPICAL at 06:01

## 2023-10-12 RX ADMIN — Medication 81 MILLIGRAM(S): at 13:52

## 2023-10-12 NOTE — PROGRESS NOTE ADULT - SUBJECTIVE AND OBJECTIVE BOX
Resting, on RA    Vital Signs Last 24 Hrs  T(C): 36.2 (10-12-23 @ 13:12), Max: 36.4 (10-11-23 @ 20:36)  T(F): 97.2 (10-12-23 @ 13:12), Max: 97.6 (10-11-23 @ 20:36)  HR: 85 (10-12-23 @ 13:54) (85 - 110)  BP: 100/53 (10-12-23 @ 13:54) (91/54 - 117/62)  RR: 18 (10-12-23 @ 13:12) (18 - 20)  SpO2: 93% (10-12-23 @ 13:12) (93% - 97%)    I&O's Detail    11 Oct 2023 07:01  -  12 Oct 2023 07:00  --------------------------------------------------------  OUT:    Other (mL): 1100 mL  Total OUT: 1100 mL    s1s2  b/l air entry  soft, ND  sm edema, LUE AVF + bruit                                                                                   7.4    16.12 )-----------( 140      ( 12 Oct 2023 07:13 )             22.1     12 Oct 2023 07:13    136    |  100    |  42     ----------------------------<  102    3.8     |  28     |  3.42     Ca    9.1        12 Oct 2023 07:13  Phos  3.8       12 Oct 2023 07:13  Mg     1.7       12 Oct 2023 07:13    TPro  5.8    /  Alb  1.9    /  TBili  0.9    /  DBili  x      /  AST  16     /  ALT  11     /  AlkPhos  203    12 Oct 2023 07:13    LIVER FUNCTIONS - ( 12 Oct 2023 07:13 )  Alb: 1.9 g/dL / Pro: 5.8 g/dL / ALK PHOS: 203 U/L / ALT: 11 U/L / AST: 16 U/L / GGT: x           Culture - Blood (collected 11 Oct 2023 05:52)  Source: .Blood Blood-Peripheral  Gram Stain (12 Oct 2023 09:49):    Growth in aerobic bottle: Gram Positive Cocci in Clusters    Growth in anaerobic bottle: Gram Positive Cocci in Clusters  Preliminary Report (12 Oct 2023 09:50):    Growth in aerobic bottle: Gram Positive Cocci in Clusters    Growth in anaerobic bottle: Gram Positive Cocci in Clusters    Culture - Blood (collected 11 Oct 2023 05:26)  Source: .Blood Blood-Peripheral  Gram Stain (12 Oct 2023 12:13):    Growth in aerobic bottle: Gram Positive Cocci in Clusters  Preliminary Report (12 Oct 2023 12:13):    Growth in aerobic bottle: Gram Positive Cocci in Clusters    acetaminophen     Tablet .. 650 milliGRAM(s) Oral every 6 hours PRN  aluminum hydroxide/magnesium hydroxide/simethicone Suspension 10 milliLiter(s) Oral every 6 hours PRN  apixaban 5 milliGRAM(s) Oral two times a day  aspirin  chewable 81 milliGRAM(s) Oral daily  ceftaroline fosamil IVPB 200 milliGRAM(s) IV Intermittent every 8 hours  ceftaroline fosamil IVPB      chlorhexidine 2% Cloths 1 Application(s) Topical <User Schedule>  DAPTOmycin IVPB 600 milliGRAM(s) IV Intermittent every 48 hours  epoetin val (PROCRIT) Injectable 20271 Unit(s) IV Push <User Schedule>  folic acid 1 milliGRAM(s) Oral daily  hydrocortisone hemorrhoidal Suppository 1 Suppository(s) Rectal two times a day  influenza  Vaccine (HIGH DOSE) 0.7 milliLiter(s) IntraMuscular once  levothyroxine 150 MICROGram(s) Oral daily  mesalamine Suppository 1000 milliGRAM(s) Rectal at bedtime  midodrine 10 milliGRAM(s) Oral every 8 hours  pantoprazole   Suspension 40 milliGRAM(s) Enteral Tube daily  polyethylene glycol 3350 17 Gram(s) Oral daily  senna 2 Tablet(s) Oral at bedtime  sodium chloride 0.9% lock flush 10 milliLiter(s) IV Push every 1 hour PRN    A/P:    ESRD on HD at  SNF MWF via perm cath  Hx Afib, CM, EF 45 - 50%  Has LUE AVF, not yet mature   Adm 9/30/23 w/MRSA bacteremia, persistent   Abx per ID  Perm cath d/c-d 10/2/23  Cath cx pos for MRSA  ERENDIRA if c/w goc   HD MWF via temp IJ HD cath (placed 10/4/23)  Fluid removal w/HD as tolerates   Prognosis is guarded overall    [phone] Resting, on RA    Vital Signs Last 24 Hrs  T(C): 36.2 (10-12-23 @ 13:12), Max: 36.4 (10-11-23 @ 20:36)  T(F): 97.2 (10-12-23 @ 13:12), Max: 97.6 (10-11-23 @ 20:36)  HR: 85 (10-12-23 @ 13:54) (85 - 110)  BP: 100/53 (10-12-23 @ 13:54) (91/54 - 117/62)  RR: 18 (10-12-23 @ 13:12) (18 - 20)  SpO2: 93% (10-12-23 @ 13:12) (93% - 97%)    I&O's Detail    11 Oct 2023 07:01  -  12 Oct 2023 07:00  --------------------------------------------------------  OUT:    Other (mL): 1100 mL  Total OUT: 1100 mL    s1s2  b/l air entry  soft, ND  sm edema, LUE AVF + bruit                                                                                   7.4    16.12 )-----------( 140      ( 12 Oct 2023 07:13 )             22.1     12 Oct 2023 07:13    136    |  100    |  42     ----------------------------<  102    3.8     |  28     |  3.42     Ca    9.1        12 Oct 2023 07:13  Phos  3.8       12 Oct 2023 07:13  Mg     1.7       12 Oct 2023 07:13    TPro  5.8    /  Alb  1.9    /  TBili  0.9    /  DBili  x      /  AST  16     /  ALT  11     /  AlkPhos  203    12 Oct 2023 07:13    LIVER FUNCTIONS - ( 12 Oct 2023 07:13 )  Alb: 1.9 g/dL / Pro: 5.8 g/dL / ALK PHOS: 203 U/L / ALT: 11 U/L / AST: 16 U/L / GGT: x           Culture - Blood (collected 11 Oct 2023 05:52)  Source: .Blood Blood-Peripheral  Gram Stain (12 Oct 2023 09:49):    Growth in aerobic bottle: Gram Positive Cocci in Clusters    Growth in anaerobic bottle: Gram Positive Cocci in Clusters  Preliminary Report (12 Oct 2023 09:50):    Growth in aerobic bottle: Gram Positive Cocci in Clusters    Growth in anaerobic bottle: Gram Positive Cocci in Clusters    Culture - Blood (collected 11 Oct 2023 05:26)  Source: .Blood Blood-Peripheral  Gram Stain (12 Oct 2023 12:13):    Growth in aerobic bottle: Gram Positive Cocci in Clusters  Preliminary Report (12 Oct 2023 12:13):    Growth in aerobic bottle: Gram Positive Cocci in Clusters    acetaminophen     Tablet .. 650 milliGRAM(s) Oral every 6 hours PRN  aluminum hydroxide/magnesium hydroxide/simethicone Suspension 10 milliLiter(s) Oral every 6 hours PRN  apixaban 5 milliGRAM(s) Oral two times a day  aspirin  chewable 81 milliGRAM(s) Oral daily  ceftaroline fosamil IVPB 200 milliGRAM(s) IV Intermittent every 8 hours  ceftaroline fosamil IVPB      chlorhexidine 2% Cloths 1 Application(s) Topical <User Schedule>  DAPTOmycin IVPB 600 milliGRAM(s) IV Intermittent every 48 hours  epoetin val (PROCRIT) Injectable 12288 Unit(s) IV Push <User Schedule>  folic acid 1 milliGRAM(s) Oral daily  hydrocortisone hemorrhoidal Suppository 1 Suppository(s) Rectal two times a day  influenza  Vaccine (HIGH DOSE) 0.7 milliLiter(s) IntraMuscular once  levothyroxine 150 MICROGram(s) Oral daily  mesalamine Suppository 1000 milliGRAM(s) Rectal at bedtime  midodrine 10 milliGRAM(s) Oral every 8 hours  pantoprazole   Suspension 40 milliGRAM(s) Enteral Tube daily  polyethylene glycol 3350 17 Gram(s) Oral daily  senna 2 Tablet(s) Oral at bedtime  sodium chloride 0.9% lock flush 10 milliLiter(s) IV Push every 1 hour PRN    A/P:    ESRD on HD at  SNF MWF via perm cath  Hx Afib, CM, EF 45 - 50%  Has LUE AVF, not yet mature   Adm 9/30/23 w/MRSA bacteremia, persistent   Abx per ID  Perm cath d/c-d 10/2/23  Cath cx pos for MRSA  ERENDIRA if c/w goc   HD MWF via temp IJ HD cath (placed 10/4/23)  Fluid removal w/HD as tolerates   Prognosis is guarded overall    [phone] Resting, on RA    Vital Signs Last 24 Hrs  T(C): 36.2 (10-12-23 @ 13:12), Max: 36.4 (10-11-23 @ 20:36)  T(F): 97.2 (10-12-23 @ 13:12), Max: 97.6 (10-11-23 @ 20:36)  HR: 85 (10-12-23 @ 13:54) (85 - 110)  BP: 100/53 (10-12-23 @ 13:54) (91/54 - 117/62)  RR: 18 (10-12-23 @ 13:12) (18 - 20)  SpO2: 93% (10-12-23 @ 13:12) (93% - 97%)    I&O's Detail    11 Oct 2023 07:01  -  12 Oct 2023 07:00  --------------------------------------------------------  OUT:    Other (mL): 1100 mL  Total OUT: 1100 mL    s1s2  b/l air entry  soft, ND  sm edema, LUE AVF + bruit                                                                                   7.4    16.12 )-----------( 140      ( 12 Oct 2023 07:13 )             22.1     12 Oct 2023 07:13    136    |  100    |  42     ----------------------------<  102    3.8     |  28     |  3.42     Ca    9.1        12 Oct 2023 07:13  Phos  3.8       12 Oct 2023 07:13  Mg     1.7       12 Oct 2023 07:13    TPro  5.8    /  Alb  1.9    /  TBili  0.9    /  DBili  x      /  AST  16     /  ALT  11     /  AlkPhos  203    12 Oct 2023 07:13    LIVER FUNCTIONS - ( 12 Oct 2023 07:13 )  Alb: 1.9 g/dL / Pro: 5.8 g/dL / ALK PHOS: 203 U/L / ALT: 11 U/L / AST: 16 U/L / GGT: x           Culture - Blood (collected 11 Oct 2023 05:52)  Source: .Blood Blood-Peripheral  Gram Stain (12 Oct 2023 09:49):    Growth in aerobic bottle: Gram Positive Cocci in Clusters    Growth in anaerobic bottle: Gram Positive Cocci in Clusters  Preliminary Report (12 Oct 2023 09:50):    Growth in aerobic bottle: Gram Positive Cocci in Clusters    Growth in anaerobic bottle: Gram Positive Cocci in Clusters    Culture - Blood (collected 11 Oct 2023 05:26)  Source: .Blood Blood-Peripheral  Gram Stain (12 Oct 2023 12:13):    Growth in aerobic bottle: Gram Positive Cocci in Clusters  Preliminary Report (12 Oct 2023 12:13):    Growth in aerobic bottle: Gram Positive Cocci in Clusters    acetaminophen     Tablet .. 650 milliGRAM(s) Oral every 6 hours PRN  aluminum hydroxide/magnesium hydroxide/simethicone Suspension 10 milliLiter(s) Oral every 6 hours PRN  apixaban 5 milliGRAM(s) Oral two times a day  aspirin  chewable 81 milliGRAM(s) Oral daily  ceftaroline fosamil IVPB 200 milliGRAM(s) IV Intermittent every 8 hours  ceftaroline fosamil IVPB      chlorhexidine 2% Cloths 1 Application(s) Topical <User Schedule>  DAPTOmycin IVPB 600 milliGRAM(s) IV Intermittent every 48 hours  epoetin val (PROCRIT) Injectable 18331 Unit(s) IV Push <User Schedule>  folic acid 1 milliGRAM(s) Oral daily  hydrocortisone hemorrhoidal Suppository 1 Suppository(s) Rectal two times a day  influenza  Vaccine (HIGH DOSE) 0.7 milliLiter(s) IntraMuscular once  levothyroxine 150 MICROGram(s) Oral daily  mesalamine Suppository 1000 milliGRAM(s) Rectal at bedtime  midodrine 10 milliGRAM(s) Oral every 8 hours  pantoprazole   Suspension 40 milliGRAM(s) Enteral Tube daily  polyethylene glycol 3350 17 Gram(s) Oral daily  senna 2 Tablet(s) Oral at bedtime  sodium chloride 0.9% lock flush 10 milliLiter(s) IV Push every 1 hour PRN    A/P:    ESRD on HD at  SNF MWF via perm cath  Hx Afib, CM, EF 45 - 50%  Has LUE AVF, not yet mature   Adm 9/30/23 w/MRSA bacteremia, persistent   Abx per ID  Perm cath d/c-d 10/2/23  Cath cx pos for MRSA  ERENDIRA if c/w goc   HD MWF via temp IJ HD cath (placed 10/4/23)  Fluid removal w/HD as tolerates   Prognosis is guarded overall    [phone]

## 2023-10-12 NOTE — PROGRESS NOTE ADULT - SUBJECTIVE AND OBJECTIVE BOX
Progress: continues to be mostly drowsy , is  keeping eyes open longer w/ stim.  speaking more, but remains confused to his situation      Present Symptoms:   Dyspnea: no  Nausea/Vomiting:   Anxiety:    Depressed Mood:   Fatigue:   Loss of appetite: yes  Pain:           no        location:   Review of Systems: [ Unable to obtain due to poor mentation]    MEDICATIONS  (STANDING):  apixaban 5 milliGRAM(s) Oral two times a day  aspirin  chewable 81 milliGRAM(s) Oral daily  ceftaroline fosamil IVPB 200 milliGRAM(s) IV Intermittent every 8 hours  ceftaroline fosamil IVPB      chlorhexidine 2% Cloths 1 Application(s) Topical <User Schedule>  DAPTOmycin IVPB 600 milliGRAM(s) IV Intermittent every 48 hours  epoetin val (PROCRIT) Injectable 87669 Unit(s) IV Push <User Schedule>  folic acid 1 milliGRAM(s) Oral daily  hydrocortisone hemorrhoidal Suppository 1 Suppository(s) Rectal two times a day  influenza  Vaccine (HIGH DOSE) 0.7 milliLiter(s) IntraMuscular once  levothyroxine 150 MICROGram(s) Oral daily  mesalamine Suppository 1000 milliGRAM(s) Rectal at bedtime  midodrine 10 milliGRAM(s) Oral every 8 hours  pantoprazole   Suspension 40 milliGRAM(s) Enteral Tube daily  polyethylene glycol 3350 17 Gram(s) Oral daily  senna 2 Tablet(s) Oral at bedtime    MEDICATIONS  (PRN):  acetaminophen     Tablet .. 650 milliGRAM(s) Oral every 6 hours PRN Mild Pain (1 - 3)  aluminum hydroxide/magnesium hydroxide/simethicone Suspension 10 milliLiter(s) Oral every 6 hours PRN dyspepsia  sodium chloride 0.9% lock flush 10 milliLiter(s) IV Push every 1 hour PRN Pre/post blood products, medications, blood draw, and to maintain line patency      PHYSICAL EXAM:  Vital Signs Last 24 Hrs  T(C): 36.3 (12 Oct 2023 04:58), Max: 36.5 (11 Oct 2023 13:26)  T(F): 97.3 (12 Oct 2023 04:58), Max: 97.7 (11 Oct 2023 13:26)  HR: 110 (12 Oct 2023 04:58) (76 - 110)  BP: 91/54 (12 Oct 2023 04:58) (86/45 - 119/55)  BP(mean): --  RR: 19 (12 Oct 2023 04:58) (18 - 20)  SpO2: 93% (12 Oct 2023 04:58) (93% - 97%)    Parameters below as of 12 Oct 2023 04:58  Patient On (Oxygen Delivery Method): room air      General: alert  oriented x 1-2        HEENT: n/c, a/t, dry mouth /lips     Lungs: ess cl dim bases , resp non labored      CV: tachy     GI: abd lrg., soft     : normal , incontinent    Musculoskeletal: ext edematous, and weak /stiff   Skin: pale, w/d     Neuro: + deficits, awake w/ stim., oriented x 1-2 " hospital"  , says few words   Oral intake ability:  oral feeding min w/ assist   Diet: pureed w/ thins      LABS:                          7.4    16.12 )-----------( 140      ( 12 Oct 2023 07:13 )             22.1     10-12    136  |  100  |  42<H>  ----------------------------<  102<H>  3.8   |  28  |  3.42<H>    Ca    9.1      12 Oct 2023 07:13  Phos  3.8     10-12  Mg     1.7     10-12    TPro  5.8<L>  /  Alb  1.9<L>  /  TBili  0.9  /  DBili  x   /  AST  16  /  ALT  11  /  AlkPhos  203<H>  10-12    Urinalysis Basic - ( 12 Oct 2023 07:13 )    Color: x / Appearance: x / SG: x / pH: x  Gluc: 102 mg/dL / Ketone: x  / Bili: x / Urobili: x   Blood: x / Protein: x / Nitrite: x   Leuk Esterase: x / RBC: x / WBC x   Sq Epi: x / Non Sq Epi: x / Bacteria: x        RADIOLOGY & ADDITIONAL STUDIES: < from: CT Abdomen and Pelvis No Cont (10.11.23 @ 10:08) >  ACC: [id number] EXAM:  CT ABDOMEN AND PELVIS   ORDERED BY: SARA HERNANDEZ     ACC: [id number] EXAM:  CT CHEST   ORDERED BY: SARA HERNANDEZ     PROCEDURE DATE:  10/11/2023          INTERPRETATION:  CLINICAL INFORMATION: MRSA bacteremia.    COMPARISON: None.    CONTRAST/COMPLICATIONS:  IV Contrast: NONE  Oral Contrast: NONE  Complications: None reported at time of study completion    PROCEDURE:  CT of the Chest, Abdomen and Pelvis was performed.  Sagittal and coronal reformats were performed.    FINDINGS:  CHEST:  LUNGS AND LARGE AIRWAYS: Patent central airways. Bilateral lower lobe   atelectatic changes; underlying parenchymal infiltrate/consolidation   cannot be excluded. Interlobular septal thickening identified, raising   suspicion for an element of interstitial edema.  PLEURA: Moderate to large right pleural effusion. Small to moderate left   pleural effusion. No evidence for pneumothorax.  VESSELS: Coronary arterial calcifications. Atherosclerotic aortic   calcifications.  HEART: Cardiomegaly. No pericardial effusion. Micra pacemaker. 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  MEDIASTINUM AND VILLA: No lymphadenopathy.  CHEST WALL AND LOWER NECK: Within normal limits.    ABDOMEN AND PELVIS:  LIVER: The liver is normal in size. No focal hepatic masses are   identified.  BILE DUCTS: Normal caliber.  GALLBLADDER: Large gallstone measuring 2.8 cm. No definite gallbladder   wall thickening.  SPLEEN: Within normal limits.  PANCREAS: Within normal limits.  ADRENALS: Within normal limits.  KIDNEYS/URETERS: A horseshoe kidney, with bilateral renal calculi   measuring up to 10 mm. No hydronephrosis. No discrete space-occupying   lesions of the renal parenchyma noted.    BLADDER: Poorly distended; element of bladder wall thickening cannot be   excluded. Mild stranding of the perivesical fat noted; correlate for   cystitis.  REPRODUCTIVE ORGANS: Foci of prostatic calcification noted.    BOWEL: Fecal material scattered throughout the colon. No evidence for   mechanical bowel obstruction. No colonic wall thickening. Appendix is   normal.  PERITONEUM: Small volume intra-abdominal ascites. No free intraperitoneal   air.  VESSELS: Atherosclerotic changes.  RETROPERITONEUM/LYMPH NODES: No lymphadenopathy.  ABDOMINAL WALL: Fat containing bilateral inguinal hernias, right greater   than left. Small fat-containing umbilical hernia. There is diffuse   subcutaneous edema consistent with anasarca.  BONES: Left femoral neck pinning. Status post sternotomy. Degenerative   changes.    IMPRESSION:  1. Bilateral pleural effusions, right greater left. Bilateral lower lobe   atelectatic changes; underlying parenchymal infiltrate/consolidation   cannot be excluded. Interlobular septal thickening.  2. 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   Mild stranding of the perivesical fat, with suspected bladder wall   thickening. Correlate for cystitis.            ADVANCE DIRECTIVES:  hcp, full code   Advanced Care Planning discussion total time spent:     Progress: continues to be mostly drowsy , is  keeping eyes open longer w/ stim.  speaking more, but remains confused to his situation      Present Symptoms:   Dyspnea: no  Nausea/Vomiting:   Anxiety:    Depressed Mood:   Fatigue:   Loss of appetite: yes  Pain:           no        location:   Review of Systems: [ Unable to obtain due to poor mentation]    MEDICATIONS  (STANDING):  apixaban 5 milliGRAM(s) Oral two times a day  aspirin  chewable 81 milliGRAM(s) Oral daily  ceftaroline fosamil IVPB 200 milliGRAM(s) IV Intermittent every 8 hours  ceftaroline fosamil IVPB      chlorhexidine 2% Cloths 1 Application(s) Topical <User Schedule>  DAPTOmycin IVPB 600 milliGRAM(s) IV Intermittent every 48 hours  epoetin val (PROCRIT) Injectable 44638 Unit(s) IV Push <User Schedule>  folic acid 1 milliGRAM(s) Oral daily  hydrocortisone hemorrhoidal Suppository 1 Suppository(s) Rectal two times a day  influenza  Vaccine (HIGH DOSE) 0.7 milliLiter(s) IntraMuscular once  levothyroxine 150 MICROGram(s) Oral daily  mesalamine Suppository 1000 milliGRAM(s) Rectal at bedtime  midodrine 10 milliGRAM(s) Oral every 8 hours  pantoprazole   Suspension 40 milliGRAM(s) Enteral Tube daily  polyethylene glycol 3350 17 Gram(s) Oral daily  senna 2 Tablet(s) Oral at bedtime    MEDICATIONS  (PRN):  acetaminophen     Tablet .. 650 milliGRAM(s) Oral every 6 hours PRN Mild Pain (1 - 3)  aluminum hydroxide/magnesium hydroxide/simethicone Suspension 10 milliLiter(s) Oral every 6 hours PRN dyspepsia  sodium chloride 0.9% lock flush 10 milliLiter(s) IV Push every 1 hour PRN Pre/post blood products, medications, blood draw, and to maintain line patency      PHYSICAL EXAM:  Vital Signs Last 24 Hrs  T(C): 36.3 (12 Oct 2023 04:58), Max: 36.5 (11 Oct 2023 13:26)  T(F): 97.3 (12 Oct 2023 04:58), Max: 97.7 (11 Oct 2023 13:26)  HR: 110 (12 Oct 2023 04:58) (76 - 110)  BP: 91/54 (12 Oct 2023 04:58) (86/45 - 119/55)  BP(mean): --  RR: 19 (12 Oct 2023 04:58) (18 - 20)  SpO2: 93% (12 Oct 2023 04:58) (93% - 97%)    Parameters below as of 12 Oct 2023 04:58  Patient On (Oxygen Delivery Method): room air      General: alert  oriented x 1-2        HEENT: n/c, a/t, dry mouth /lips     Lungs: ess cl dim bases , resp non labored      CV: tachy     GI: abd lrg., soft     : normal , incontinent    Musculoskeletal: ext edematous, and weak /stiff   Skin: pale, w/d     Neuro: + deficits, awake w/ stim., oriented x 1-2 " hospital"  , says few words   Oral intake ability:  oral feeding min w/ assist   Diet: pureed w/ thins      LABS:                          7.4    16.12 )-----------( 140      ( 12 Oct 2023 07:13 )             22.1     10-12    136  |  100  |  42<H>  ----------------------------<  102<H>  3.8   |  28  |  3.42<H>    Ca    9.1      12 Oct 2023 07:13  Phos  3.8     10-12  Mg     1.7     10-12    TPro  5.8<L>  /  Alb  1.9<L>  /  TBili  0.9  /  DBili  x   /  AST  16  /  ALT  11  /  AlkPhos  203<H>  10-12    Urinalysis Basic - ( 12 Oct 2023 07:13 )    Color: x / Appearance: x / SG: x / pH: x  Gluc: 102 mg/dL / Ketone: x  / Bili: x / Urobili: x   Blood: x / Protein: x / Nitrite: x   Leuk Esterase: x / RBC: x / WBC x   Sq Epi: x / Non Sq Epi: x / Bacteria: x        RADIOLOGY & ADDITIONAL STUDIES: < from: CT Abdomen and Pelvis No Cont (10.11.23 @ 10:08) >  ACC: [id number] EXAM:  CT ABDOMEN AND PELVIS   ORDERED BY: SARA HERNANDEZ     ACC: [id number] EXAM:  CT CHEST   ORDERED BY: SARA HERNANDEZ     PROCEDURE DATE:  10/11/2023          INTERPRETATION:  CLINICAL INFORMATION: MRSA bacteremia.    COMPARISON: None.    CONTRAST/COMPLICATIONS:  IV Contrast: NONE  Oral Contrast: NONE  Complications: None reported at time of study completion    PROCEDURE:  CT of the Chest, Abdomen and Pelvis was performed.  Sagittal and coronal reformats were performed.    FINDINGS:  CHEST:  LUNGS AND LARGE AIRWAYS: Patent central airways. Bilateral lower lobe   atelectatic changes; underlying parenchymal infiltrate/consolidation   cannot be excluded. Interlobular septal thickening identified, raising   suspicion for an element of interstitial edema.  PLEURA: Moderate to large right pleural effusion. Small to moderate left   pleural effusion. No evidence for pneumothorax.  VESSELS: Coronary arterial calcifications. Atherosclerotic aortic   calcifications.  HEART: Cardiomegaly. No pericardial effusion. Micra pacemaker. 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  MEDIASTINUM AND VILLA: No lymphadenopathy.  CHEST WALL AND LOWER NECK: Within normal limits.    ABDOMEN AND PELVIS:  LIVER: The liver is normal in size. No focal hepatic masses are   identified.  BILE DUCTS: Normal caliber.  GALLBLADDER: Large gallstone measuring 2.8 cm. No definite gallbladder   wall thickening.  SPLEEN: Within normal limits.  PANCREAS: Within normal limits.  ADRENALS: Within normal limits.  KIDNEYS/URETERS: A horseshoe kidney, with bilateral renal calculi   measuring up to 10 mm. No hydronephrosis. No discrete space-occupying   lesions of the renal parenchyma noted.    BLADDER: Poorly distended; element of bladder wall thickening cannot be   excluded. Mild stranding of the perivesical fat noted; correlate for   cystitis.  REPRODUCTIVE ORGANS: Foci of prostatic calcification noted.    BOWEL: Fecal material scattered throughout the colon. No evidence for   mechanical bowel obstruction. No colonic wall thickening. Appendix is   normal.  PERITONEUM: Small volume intra-abdominal ascites. No free intraperitoneal   air.  VESSELS: Atherosclerotic changes.  RETROPERITONEUM/LYMPH NODES: No lymphadenopathy.  ABDOMINAL WALL: Fat containing bilateral inguinal hernias, right greater   than left. Small fat-containing umbilical hernia. There is diffuse   subcutaneous edema consistent with anasarca.  BONES: Left femoral neck pinning. Status post sternotomy. Degenerative   changes.    IMPRESSION:  1. Bilateral pleural effusions, right greater left. Bilateral lower lobe   atelectatic changes; underlying parenchymal infiltrate/consolidation   cannot be excluded. Interlobular septal thickening.  2. 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   Mild stranding of the perivesical fat, with suspected bladder wall   thickening. Correlate for cystitis.            ADVANCE DIRECTIVES:  hcp, full code   Advanced Care Planning discussion total time spent:     Progress: continues to be mostly drowsy , is  keeping eyes open longer w/ stim.  speaking more, but remains confused to his situation      Present Symptoms:   Dyspnea: no  Nausea/Vomiting:   Anxiety:    Depressed Mood:   Fatigue:   Loss of appetite: yes  Pain:           no        location:   Review of Systems: [ Unable to obtain due to poor mentation]    MEDICATIONS  (STANDING):  apixaban 5 milliGRAM(s) Oral two times a day  aspirin  chewable 81 milliGRAM(s) Oral daily  ceftaroline fosamil IVPB 200 milliGRAM(s) IV Intermittent every 8 hours  ceftaroline fosamil IVPB      chlorhexidine 2% Cloths 1 Application(s) Topical <User Schedule>  DAPTOmycin IVPB 600 milliGRAM(s) IV Intermittent every 48 hours  epoetin val (PROCRIT) Injectable 85270 Unit(s) IV Push <User Schedule>  folic acid 1 milliGRAM(s) Oral daily  hydrocortisone hemorrhoidal Suppository 1 Suppository(s) Rectal two times a day  influenza  Vaccine (HIGH DOSE) 0.7 milliLiter(s) IntraMuscular once  levothyroxine 150 MICROGram(s) Oral daily  mesalamine Suppository 1000 milliGRAM(s) Rectal at bedtime  midodrine 10 milliGRAM(s) Oral every 8 hours  pantoprazole   Suspension 40 milliGRAM(s) Enteral Tube daily  polyethylene glycol 3350 17 Gram(s) Oral daily  senna 2 Tablet(s) Oral at bedtime    MEDICATIONS  (PRN):  acetaminophen     Tablet .. 650 milliGRAM(s) Oral every 6 hours PRN Mild Pain (1 - 3)  aluminum hydroxide/magnesium hydroxide/simethicone Suspension 10 milliLiter(s) Oral every 6 hours PRN dyspepsia  sodium chloride 0.9% lock flush 10 milliLiter(s) IV Push every 1 hour PRN Pre/post blood products, medications, blood draw, and to maintain line patency      PHYSICAL EXAM:  Vital Signs Last 24 Hrs  T(C): 36.3 (12 Oct 2023 04:58), Max: 36.5 (11 Oct 2023 13:26)  T(F): 97.3 (12 Oct 2023 04:58), Max: 97.7 (11 Oct 2023 13:26)  HR: 110 (12 Oct 2023 04:58) (76 - 110)  BP: 91/54 (12 Oct 2023 04:58) (86/45 - 119/55)  BP(mean): --  RR: 19 (12 Oct 2023 04:58) (18 - 20)  SpO2: 93% (12 Oct 2023 04:58) (93% - 97%)    Parameters below as of 12 Oct 2023 04:58  Patient On (Oxygen Delivery Method): room air      General: alert  oriented x 1-2        HEENT: n/c, a/t, dry mouth /lips     Lungs: ess cl dim bases , resp non labored      CV: tachy     GI: abd lrg., soft     : normal , incontinent    Musculoskeletal: ext edematous, and weak /stiff   Skin: pale, w/d     Neuro: + deficits, awake w/ stim., oriented x 1-2 " hospital"  , says few words   Oral intake ability:  oral feeding min w/ assist   Diet: pureed w/ thins      LABS:                          7.4    16.12 )-----------( 140      ( 12 Oct 2023 07:13 )             22.1     10-12    136  |  100  |  42<H>  ----------------------------<  102<H>  3.8   |  28  |  3.42<H>    Ca    9.1      12 Oct 2023 07:13  Phos  3.8     10-12  Mg     1.7     10-12    TPro  5.8<L>  /  Alb  1.9<L>  /  TBili  0.9  /  DBili  x   /  AST  16  /  ALT  11  /  AlkPhos  203<H>  10-12    Urinalysis Basic - ( 12 Oct 2023 07:13 )    Color: x / Appearance: x / SG: x / pH: x  Gluc: 102 mg/dL / Ketone: x  / Bili: x / Urobili: x   Blood: x / Protein: x / Nitrite: x   Leuk Esterase: x / RBC: x / WBC x   Sq Epi: x / Non Sq Epi: x / Bacteria: x        RADIOLOGY & ADDITIONAL STUDIES: < from: CT Abdomen and Pelvis No Cont (10.11.23 @ 10:08) >  ACC: [id number] EXAM:  CT ABDOMEN AND PELVIS   ORDERED BY: SARA HERNANDEZ     ACC: [id number] EXAM:  CT CHEST   ORDERED BY: SARA HERNANDEZ     PROCEDURE DATE:  10/11/2023          INTERPRETATION:  CLINICAL INFORMATION: MRSA bacteremia.    COMPARISON: None.    CONTRAST/COMPLICATIONS:  IV Contrast: NONE  Oral Contrast: NONE  Complications: None reported at time of study completion    PROCEDURE:  CT of the Chest, Abdomen and Pelvis was performed.  Sagittal and coronal reformats were performed.    FINDINGS:  CHEST:  LUNGS AND LARGE AIRWAYS: Patent central airways. Bilateral lower lobe   atelectatic changes; underlying parenchymal infiltrate/consolidation   cannot be excluded. Interlobular septal thickening identified, raising   suspicion for an element of interstitial edema.  PLEURA: Moderate to large right pleural effusion. Small to moderate left   pleural effusion. No evidence for pneumothorax.  VESSELS: Coronary arterial calcifications. Atherosclerotic aortic   calcifications.  HEART: Cardiomegaly. No pericardial effusion. Micra pacemaker. 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  MEDIASTINUM AND VILLA: No lymphadenopathy.  CHEST WALL AND LOWER NECK: Within normal limits.    ABDOMEN AND PELVIS:  LIVER: The liver is normal in size. No focal hepatic masses are   identified.  BILE DUCTS: Normal caliber.  GALLBLADDER: Large gallstone measuring 2.8 cm. No definite gallbladder   wall thickening.  SPLEEN: Within normal limits.  PANCREAS: Within normal limits.  ADRENALS: Within normal limits.  KIDNEYS/URETERS: A horseshoe kidney, with bilateral renal calculi   measuring up to 10 mm. No hydronephrosis. No discrete space-occupying   lesions of the renal parenchyma noted.    BLADDER: Poorly distended; element of bladder wall thickening cannot be   excluded. Mild stranding of the perivesical fat noted; correlate for   cystitis.  REPRODUCTIVE ORGANS: Foci of prostatic calcification noted.    BOWEL: Fecal material scattered throughout the colon. No evidence for   mechanical bowel obstruction. No colonic wall thickening. Appendix is   normal.  PERITONEUM: Small volume intra-abdominal ascites. No free intraperitoneal   air.  VESSELS: Atherosclerotic changes.  RETROPERITONEUM/LYMPH NODES: No lymphadenopathy.  ABDOMINAL WALL: Fat containing bilateral inguinal hernias, right greater   than left. Small fat-containing umbilical hernia. There is diffuse   subcutaneous edema consistent with anasarca.  BONES: Left femoral neck pinning. Status post sternotomy. Degenerative   changes.    IMPRESSION:  1. Bilateral pleural effusions, right greater left. Bilateral lower lobe   atelectatic changes; underlying parenchymal infiltrate/consolidation   cannot be excluded. Interlobular septal thickening.  2. 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   Mild stranding of the perivesical fat, with suspected bladder wall   thickening. Correlate for cystitis.            ADVANCE DIRECTIVES:  hcp, full code   Advanced Care Planning discussion total time spent:

## 2023-10-12 NOTE — PROGRESS NOTE ADULT - SUBJECTIVE AND OBJECTIVE BOX
JANEE ARCEO, 78y Male  MRN: [id number]  ATTENDING: Martin Almaguer    HPI:  78M, PMHx MVR, PPM, s/p removal for MSSA infection, ESRD, on dialysis, Clostridium bacteremia in August (proctitis source) admitted with fever (102.2) found with high-grade sustained MRSA bacteremia.  On 10/6/2023 patient had permacath removed.  Brought spectrum antibiotic coverage.  Not a candidate for surgical valve removal.  Patient is poor historian.  Hematology consulted for anemia and thrombocytopenia.    MEDICATIONS:  acetaminophen     Tablet .. 650 milliGRAM(s) Oral every 6 hours PRN  aluminum hydroxide/magnesium hydroxide/simethicone Suspension 10 milliLiter(s) Oral every 6 hours PRN  aspirin  chewable 81 milliGRAM(s) Oral daily  ceftaroline fosamil IVPB 200 milliGRAM(s) IV Intermittent once  ceftaroline fosamil IVPB      ceftaroline fosamil IVPB 200 milliGRAM(s) IV Intermittent every 8 hours  chlorhexidine 2% Cloths 1 Application(s) Topical <User Schedule>  epoetin val (PROCRIT) Injectable 66751 Unit(s) IV Push <User Schedule>  folic acid 1 milliGRAM(s) Oral daily  heparin   Injectable 5000 Unit(s) SubCutaneous every 12 hours  hydrocortisone hemorrhoidal Suppository 1 Suppository(s) Rectal two times a day  influenza  Vaccine (HIGH DOSE) 0.7 milliLiter(s) IntraMuscular once  levothyroxine 137 MICROGram(s) Oral daily  mesalamine Suppository 1000 milliGRAM(s) Rectal at bedtime  midodrine 10 milliGRAM(s) Oral every 8 hours  pantoprazole   Suspension 40 milliGRAM(s) Enteral Tube daily  polyethylene glycol 3350 17 Gram(s) Oral daily  senna 2 Tablet(s) Oral at bedtime  sodium chloride 0.9% lock flush 10 milliLiter(s) IV Push every 1 hour PRN  vancomycin  IVPB 750 milliGRAM(s) IV Intermittent <User Schedule>    All other medications reviewed.    SUBJECTIVE:  Alert, at times confused.  Complaining of perioral numbness.      VITALS:  T(C): 36.6 (10-09-23 @ 12:30), Max: 37.7 (10-08-23 @ 21:19)  T(F): 97.9 (10-09-23 @ 12:30), Max: 99.9 (10-08-23 @ 21:19)  HR: 93 (10-09-23 @ 13:07) (74 - 122)  BP: 89/52 (10-09-23 @ 13:07) (89/52 - 127/66)      PHYSICAL EXAM:  Constitutional: alert, well developed  HEENT: normocephalic, anicteric sclerae, no mucositis or thrush  Respiratory: bilateral clear to auscultation anteriorly  Cardiovascular : S1, S2 regular, rhythmic, no murmurs, gallops or rubs  Abdomen: soft, distended, + normoactive BS, no palpable HS- megaly  Extremities: no tenderness;  -c/c/e, pulses equal bilaterally    LABS:  (10-09) WBC: 18.06 K/uL,Hemoglobin: 7.8 g/dL, Hematocrit: 22.2 %,  Platelet: 111 K/uL  (10-09) Na: 134 mmol/L ; K: 4.1 mmol/L ; BUN: 82 mg/dL ; Cr: 5.27 mg/dL.    RADIOLOGY:  ACC: [id number] EXAM:  CT BRAIN   ORDERED BY: JULIA CRUZ     PROCEDURE DATE:  10/04/2023          INTERPRETATION:  CLINICAL INFORMATION: Altered mental status.    TECHNIQUE:  Noncontrast axial CT images were acquired through the head.  Sagittal and coronal reformats were performed.    COMPARISON STUDY: None    FINDINGS:  There is no CT evidence of acute intracranial hemorrhage, mass effect or   midline shift.  There is no acute loss of gray matter-white matter   differentiation.    Slight prominence of the ventricles is compatible with mild age-related   involutional changes.  No clinically significant chronic microvascular   ischemic disease or other white matter pathology is visualized by CT   technique.      The paranasal sinuses and mastoids are grossly clear.    The patient is status post intraocular lens replacement bilaterally..    The calvarium and skull base appear within normal limits.    IMPRESSION:  No CT evidence of acute intracranial pathology.     JANEE ARCEO, 78y Male  MRN: [id number]  ATTENDING: Martin Almaguer    HPI:  78M, PMHx MVR, PPM, s/p removal for MSSA infection, ESRD, on dialysis, Clostridium bacteremia in August (proctitis source) admitted with fever (102.2) found with high-grade sustained MRSA bacteremia.  On 10/6/2023 patient had permacath removed.  Brought spectrum antibiotic coverage.  Not a candidate for surgical valve removal.  Patient is poor historian.  Hematology consulted for anemia and thrombocytopenia.    MEDICATIONS:  acetaminophen     Tablet .. 650 milliGRAM(s) Oral every 6 hours PRN  aluminum hydroxide/magnesium hydroxide/simethicone Suspension 10 milliLiter(s) Oral every 6 hours PRN  aspirin  chewable 81 milliGRAM(s) Oral daily  ceftaroline fosamil IVPB 200 milliGRAM(s) IV Intermittent once  ceftaroline fosamil IVPB      ceftaroline fosamil IVPB 200 milliGRAM(s) IV Intermittent every 8 hours  chlorhexidine 2% Cloths 1 Application(s) Topical <User Schedule>  epoetin val (PROCRIT) Injectable 23616 Unit(s) IV Push <User Schedule>  folic acid 1 milliGRAM(s) Oral daily  heparin   Injectable 5000 Unit(s) SubCutaneous every 12 hours  hydrocortisone hemorrhoidal Suppository 1 Suppository(s) Rectal two times a day  influenza  Vaccine (HIGH DOSE) 0.7 milliLiter(s) IntraMuscular once  levothyroxine 137 MICROGram(s) Oral daily  mesalamine Suppository 1000 milliGRAM(s) Rectal at bedtime  midodrine 10 milliGRAM(s) Oral every 8 hours  pantoprazole   Suspension 40 milliGRAM(s) Enteral Tube daily  polyethylene glycol 3350 17 Gram(s) Oral daily  senna 2 Tablet(s) Oral at bedtime  sodium chloride 0.9% lock flush 10 milliLiter(s) IV Push every 1 hour PRN  vancomycin  IVPB 750 milliGRAM(s) IV Intermittent <User Schedule>    All other medications reviewed.    SUBJECTIVE:  Alert, at times confused.  Complaining of perioral numbness.      VITALS:  T(C): 36.6 (10-09-23 @ 12:30), Max: 37.7 (10-08-23 @ 21:19)  T(F): 97.9 (10-09-23 @ 12:30), Max: 99.9 (10-08-23 @ 21:19)  HR: 93 (10-09-23 @ 13:07) (74 - 122)  BP: 89/52 (10-09-23 @ 13:07) (89/52 - 127/66)      PHYSICAL EXAM:  Constitutional: alert, well developed  HEENT: normocephalic, anicteric sclerae, no mucositis or thrush  Respiratory: bilateral clear to auscultation anteriorly  Cardiovascular : S1, S2 regular, rhythmic, no murmurs, gallops or rubs  Abdomen: soft, distended, + normoactive BS, no palpable HS- megaly  Extremities: no tenderness;  -c/c/e, pulses equal bilaterally    LABS:  (10-09) WBC: 18.06 K/uL,Hemoglobin: 7.8 g/dL, Hematocrit: 22.2 %,  Platelet: 111 K/uL  (10-09) Na: 134 mmol/L ; K: 4.1 mmol/L ; BUN: 82 mg/dL ; Cr: 5.27 mg/dL.    RADIOLOGY:  ACC: [id number] EXAM:  CT BRAIN   ORDERED BY: JULIA CRUZ     PROCEDURE DATE:  10/04/2023          INTERPRETATION:  CLINICAL INFORMATION: Altered mental status.    TECHNIQUE:  Noncontrast axial CT images were acquired through the head.  Sagittal and coronal reformats were performed.    COMPARISON STUDY: None    FINDINGS:  There is no CT evidence of acute intracranial hemorrhage, mass effect or   midline shift.  There is no acute loss of gray matter-white matter   differentiation.    Slight prominence of the ventricles is compatible with mild age-related   involutional changes.  No clinically significant chronic microvascular   ischemic disease or other white matter pathology is visualized by CT   technique.      The paranasal sinuses and mastoids are grossly clear.    The patient is status post intraocular lens replacement bilaterally..    The calvarium and skull base appear within normal limits.    IMPRESSION:  No CT evidence of acute intracranial pathology.     JANEE ARCEO, 78y Male  MRN: [id number]  ATTENDING: Martin Almaguer    HPI:  78M, PMHx MVR, PPM, s/p removal for MSSA infection, ESRD, on dialysis, Clostridium bacteremia in August (proctitis source) admitted with fever (102.2) found with high-grade sustained MRSA bacteremia.  On 10/6/2023 patient had permacath removed.  Brought spectrum antibiotic coverage.  Not a candidate for surgical valve removal.  Patient is poor historian.  Hematology consulted for anemia and thrombocytopenia.    MEDICATIONS:  acetaminophen     Tablet .. 650 milliGRAM(s) Oral every 6 hours PRN  aluminum hydroxide/magnesium hydroxide/simethicone Suspension 10 milliLiter(s) Oral every 6 hours PRN  aspirin  chewable 81 milliGRAM(s) Oral daily  ceftaroline fosamil IVPB 200 milliGRAM(s) IV Intermittent once  ceftaroline fosamil IVPB      ceftaroline fosamil IVPB 200 milliGRAM(s) IV Intermittent every 8 hours  chlorhexidine 2% Cloths 1 Application(s) Topical <User Schedule>  epoetin val (PROCRIT) Injectable 08022 Unit(s) IV Push <User Schedule>  folic acid 1 milliGRAM(s) Oral daily  heparin   Injectable 5000 Unit(s) SubCutaneous every 12 hours  hydrocortisone hemorrhoidal Suppository 1 Suppository(s) Rectal two times a day  influenza  Vaccine (HIGH DOSE) 0.7 milliLiter(s) IntraMuscular once  levothyroxine 137 MICROGram(s) Oral daily  mesalamine Suppository 1000 milliGRAM(s) Rectal at bedtime  midodrine 10 milliGRAM(s) Oral every 8 hours  pantoprazole   Suspension 40 milliGRAM(s) Enteral Tube daily  polyethylene glycol 3350 17 Gram(s) Oral daily  senna 2 Tablet(s) Oral at bedtime  sodium chloride 0.9% lock flush 10 milliLiter(s) IV Push every 1 hour PRN  vancomycin  IVPB 750 milliGRAM(s) IV Intermittent <User Schedule>    All other medications reviewed.    SUBJECTIVE:  Alert, at times confused.  Complaining of perioral numbness.      VITALS:  T(C): 36.6 (10-09-23 @ 12:30), Max: 37.7 (10-08-23 @ 21:19)  T(F): 97.9 (10-09-23 @ 12:30), Max: 99.9 (10-08-23 @ 21:19)  HR: 93 (10-09-23 @ 13:07) (74 - 122)  BP: 89/52 (10-09-23 @ 13:07) (89/52 - 127/66)      PHYSICAL EXAM:  Constitutional: alert, well developed  HEENT: normocephalic, anicteric sclerae, no mucositis or thrush  Respiratory: bilateral clear to auscultation anteriorly  Cardiovascular : S1, S2 regular, rhythmic, no murmurs, gallops or rubs  Abdomen: soft, distended, + normoactive BS, no palpable HS- megaly  Extremities: no tenderness;  -c/c/e, pulses equal bilaterally    LABS:  (10-09) WBC: 18.06 K/uL,Hemoglobin: 7.8 g/dL, Hematocrit: 22.2 %,  Platelet: 111 K/uL  (10-09) Na: 134 mmol/L ; K: 4.1 mmol/L ; BUN: 82 mg/dL ; Cr: 5.27 mg/dL.    RADIOLOGY:  ACC: [id number] EXAM:  CT BRAIN   ORDERED BY: JULIA CRUZ     PROCEDURE DATE:  10/04/2023          INTERPRETATION:  CLINICAL INFORMATION: Altered mental status.    TECHNIQUE:  Noncontrast axial CT images were acquired through the head.  Sagittal and coronal reformats were performed.    COMPARISON STUDY: None    FINDINGS:  There is no CT evidence of acute intracranial hemorrhage, mass effect or   midline shift.  There is no acute loss of gray matter-white matter   differentiation.    Slight prominence of the ventricles is compatible with mild age-related   involutional changes.  No clinically significant chronic microvascular   ischemic disease or other white matter pathology is visualized by CT   technique.      The paranasal sinuses and mastoids are grossly clear.    The patient is status post intraocular lens replacement bilaterally..    The calvarium and skull base appear within normal limits.    IMPRESSION:  No CT evidence of acute intracranial pathology.

## 2023-10-12 NOTE — PROGRESS NOTE ADULT - SUBJECTIVE AND OBJECTIVE BOX
Follow up for cardiac issues  SUBJ: patient appears weak. asking "is this place closing. i heard its closing." when asked if he meant the hospital he said yes  PMH  Chronic atrial fibrillation    History of BPH    CAD (coronary artery disease)    History of biliary stent insertion    Coronary stent patent    ESRD on dialysis    History of GI bleed    Mitral valve replaced        MEDICATIONS  (STANDING):  apixaban 5 milliGRAM(s) Oral two times a day  aspirin  chewable 81 milliGRAM(s) Oral daily  ceftaroline fosamil IVPB      ceftaroline fosamil IVPB 200 milliGRAM(s) IV Intermittent every 8 hours  chlorhexidine 2% Cloths 1 Application(s) Topical <User Schedule>  DAPTOmycin IVPB 600 milliGRAM(s) IV Intermittent every 48 hours  epoetin val (PROCRIT) Injectable 27981 Unit(s) IV Push <User Schedule>  folic acid 1 milliGRAM(s) Oral daily  hydrocortisone hemorrhoidal Suppository 1 Suppository(s) Rectal two times a day  influenza  Vaccine (HIGH DOSE) 0.7 milliLiter(s) IntraMuscular once  levothyroxine 150 MICROGram(s) Oral daily  mesalamine Suppository 1000 milliGRAM(s) Rectal at bedtime  midodrine 10 milliGRAM(s) Oral every 8 hours  pantoprazole   Suspension 40 milliGRAM(s) Enteral Tube daily  polyethylene glycol 3350 17 Gram(s) Oral daily  senna 2 Tablet(s) Oral at bedtime    MEDICATIONS  (PRN):  acetaminophen     Tablet .. 650 milliGRAM(s) Oral every 6 hours PRN Mild Pain (1 - 3)  aluminum hydroxide/magnesium hydroxide/simethicone Suspension 10 milliLiter(s) Oral every 6 hours PRN dyspepsia  sodium chloride 0.9% lock flush 10 milliLiter(s) IV Push every 1 hour PRN Pre/post blood products, medications, blood draw, and to maintain line patency        PHYSICAL EXAM:  Vital Signs Last 24 Hrs  T(C): 36.2 (12 Oct 2023 13:12), Max: 36.4 (11 Oct 2023 17:55)  T(F): 97.2 (12 Oct 2023 13:12), Max: 97.6 (11 Oct 2023 17:55)  HR: 85 (12 Oct 2023 13:54) (85 - 110)  BP: 100/53 (12 Oct 2023 13:54) (91/54 - 119/55)  BP(mean): --  RR: 18 (12 Oct 2023 13:12) (18 - 20)  SpO2: 93% (12 Oct 2023 13:12) (93% - 97%)    Parameters below as of 12 Oct 2023 13:12  Patient On (Oxygen Delivery Method): room air        GENERAL: NAD, weak somewhat cachectic  HEAD:  Atraumatic, Normocephalic  EYES: EOMI, PERRLA, conjunctiva and sclera clear  ENT: Moist mucous membranes,  NECK: Supple, No JVD, no bruits  CHEST/LUNG: Clear to percussion bilaterally; No rales, rhonchi, wheezing, or rubs  HEART: Regular rate and rhythm; No murmurs, rubs, or gallops PMI non displaced.        TELEMETRY:    ECG:    LABS:                        7.4    16.12 )-----------( 140      ( 12 Oct 2023 07:13 )             22.1     10-12    136  |  100  |  42<H>  ----------------------------<  102<H>  3.8   |  28  |  3.42<H>    Ca    9.1      12 Oct 2023 07:13  Phos  3.8     10-12  Mg     1.7     10-12    TPro  5.8<L>  /  Alb  1.9<L>  /  TBili  0.9  /  DBili  x   /  AST  16  /  ALT  11  /  AlkPhos  203<H>  10-12    CARDIAC MARKERS ( 11 Oct 2023 05:26 )  x     / x     / <7 U/L / x     / x              I&O's Summary    11 Oct 2023 07:01  -  12 Oct 2023 07:00  --------------------------------------------------------  IN: 100 mL / OUT: 1100 mL / NET: -1000 mL      BNP    RADIOLOGY & ADDITIONAL STUDIES:    ECHO:         Follow up for cardiac issues  SUBJ: patient appears weak. asking "is this place closing. i heard its closing." when asked if he meant the hospital he said yes  PMH  Chronic atrial fibrillation    History of BPH    CAD (coronary artery disease)    History of biliary stent insertion    Coronary stent patent    ESRD on dialysis    History of GI bleed    Mitral valve replaced        MEDICATIONS  (STANDING):  apixaban 5 milliGRAM(s) Oral two times a day  aspirin  chewable 81 milliGRAM(s) Oral daily  ceftaroline fosamil IVPB      ceftaroline fosamil IVPB 200 milliGRAM(s) IV Intermittent every 8 hours  chlorhexidine 2% Cloths 1 Application(s) Topical <User Schedule>  DAPTOmycin IVPB 600 milliGRAM(s) IV Intermittent every 48 hours  epoetin val (PROCRIT) Injectable 00973 Unit(s) IV Push <User Schedule>  folic acid 1 milliGRAM(s) Oral daily  hydrocortisone hemorrhoidal Suppository 1 Suppository(s) Rectal two times a day  influenza  Vaccine (HIGH DOSE) 0.7 milliLiter(s) IntraMuscular once  levothyroxine 150 MICROGram(s) Oral daily  mesalamine Suppository 1000 milliGRAM(s) Rectal at bedtime  midodrine 10 milliGRAM(s) Oral every 8 hours  pantoprazole   Suspension 40 milliGRAM(s) Enteral Tube daily  polyethylene glycol 3350 17 Gram(s) Oral daily  senna 2 Tablet(s) Oral at bedtime    MEDICATIONS  (PRN):  acetaminophen     Tablet .. 650 milliGRAM(s) Oral every 6 hours PRN Mild Pain (1 - 3)  aluminum hydroxide/magnesium hydroxide/simethicone Suspension 10 milliLiter(s) Oral every 6 hours PRN dyspepsia  sodium chloride 0.9% lock flush 10 milliLiter(s) IV Push every 1 hour PRN Pre/post blood products, medications, blood draw, and to maintain line patency        PHYSICAL EXAM:  Vital Signs Last 24 Hrs  T(C): 36.2 (12 Oct 2023 13:12), Max: 36.4 (11 Oct 2023 17:55)  T(F): 97.2 (12 Oct 2023 13:12), Max: 97.6 (11 Oct 2023 17:55)  HR: 85 (12 Oct 2023 13:54) (85 - 110)  BP: 100/53 (12 Oct 2023 13:54) (91/54 - 119/55)  BP(mean): --  RR: 18 (12 Oct 2023 13:12) (18 - 20)  SpO2: 93% (12 Oct 2023 13:12) (93% - 97%)    Parameters below as of 12 Oct 2023 13:12  Patient On (Oxygen Delivery Method): room air        GENERAL: NAD, weak somewhat cachectic  HEAD:  Atraumatic, Normocephalic  EYES: EOMI, PERRLA, conjunctiva and sclera clear  ENT: Moist mucous membranes,  NECK: Supple, No JVD, no bruits  CHEST/LUNG: Clear to percussion bilaterally; No rales, rhonchi, wheezing, or rubs  HEART: Regular rate and rhythm; No murmurs, rubs, or gallops PMI non displaced.        TELEMETRY:    ECG:    LABS:                        7.4    16.12 )-----------( 140      ( 12 Oct 2023 07:13 )             22.1     10-12    136  |  100  |  42<H>  ----------------------------<  102<H>  3.8   |  28  |  3.42<H>    Ca    9.1      12 Oct 2023 07:13  Phos  3.8     10-12  Mg     1.7     10-12    TPro  5.8<L>  /  Alb  1.9<L>  /  TBili  0.9  /  DBili  x   /  AST  16  /  ALT  11  /  AlkPhos  203<H>  10-12    CARDIAC MARKERS ( 11 Oct 2023 05:26 )  x     / x     / <7 U/L / x     / x              I&O's Summary    11 Oct 2023 07:01  -  12 Oct 2023 07:00  --------------------------------------------------------  IN: 100 mL / OUT: 1100 mL / NET: -1000 mL      BNP    RADIOLOGY & ADDITIONAL STUDIES:    ECHO:         Follow up for cardiac issues  SUBJ: patient appears weak. asking "is this place closing. i heard its closing." when asked if he meant the hospital he said yes  PMH  Chronic atrial fibrillation    History of BPH    CAD (coronary artery disease)    History of biliary stent insertion    Coronary stent patent    ESRD on dialysis    History of GI bleed    Mitral valve replaced        MEDICATIONS  (STANDING):  apixaban 5 milliGRAM(s) Oral two times a day  aspirin  chewable 81 milliGRAM(s) Oral daily  ceftaroline fosamil IVPB      ceftaroline fosamil IVPB 200 milliGRAM(s) IV Intermittent every 8 hours  chlorhexidine 2% Cloths 1 Application(s) Topical <User Schedule>  DAPTOmycin IVPB 600 milliGRAM(s) IV Intermittent every 48 hours  epoetin val (PROCRIT) Injectable 80515 Unit(s) IV Push <User Schedule>  folic acid 1 milliGRAM(s) Oral daily  hydrocortisone hemorrhoidal Suppository 1 Suppository(s) Rectal two times a day  influenza  Vaccine (HIGH DOSE) 0.7 milliLiter(s) IntraMuscular once  levothyroxine 150 MICROGram(s) Oral daily  mesalamine Suppository 1000 milliGRAM(s) Rectal at bedtime  midodrine 10 milliGRAM(s) Oral every 8 hours  pantoprazole   Suspension 40 milliGRAM(s) Enteral Tube daily  polyethylene glycol 3350 17 Gram(s) Oral daily  senna 2 Tablet(s) Oral at bedtime    MEDICATIONS  (PRN):  acetaminophen     Tablet .. 650 milliGRAM(s) Oral every 6 hours PRN Mild Pain (1 - 3)  aluminum hydroxide/magnesium hydroxide/simethicone Suspension 10 milliLiter(s) Oral every 6 hours PRN dyspepsia  sodium chloride 0.9% lock flush 10 milliLiter(s) IV Push every 1 hour PRN Pre/post blood products, medications, blood draw, and to maintain line patency        PHYSICAL EXAM:  Vital Signs Last 24 Hrs  T(C): 36.2 (12 Oct 2023 13:12), Max: 36.4 (11 Oct 2023 17:55)  T(F): 97.2 (12 Oct 2023 13:12), Max: 97.6 (11 Oct 2023 17:55)  HR: 85 (12 Oct 2023 13:54) (85 - 110)  BP: 100/53 (12 Oct 2023 13:54) (91/54 - 119/55)  BP(mean): --  RR: 18 (12 Oct 2023 13:12) (18 - 20)  SpO2: 93% (12 Oct 2023 13:12) (93% - 97%)    Parameters below as of 12 Oct 2023 13:12  Patient On (Oxygen Delivery Method): room air        GENERAL: NAD, weak somewhat cachectic  HEAD:  Atraumatic, Normocephalic  EYES: EOMI, PERRLA, conjunctiva and sclera clear  ENT: Moist mucous membranes,  NECK: Supple, No JVD, no bruits  CHEST/LUNG: Clear to percussion bilaterally; No rales, rhonchi, wheezing, or rubs  HEART: Regular rate and rhythm; No murmurs, rubs, or gallops PMI non displaced.        TELEMETRY:    ECG:    LABS:                        7.4    16.12 )-----------( 140      ( 12 Oct 2023 07:13 )             22.1     10-12    136  |  100  |  42<H>  ----------------------------<  102<H>  3.8   |  28  |  3.42<H>    Ca    9.1      12 Oct 2023 07:13  Phos  3.8     10-12  Mg     1.7     10-12    TPro  5.8<L>  /  Alb  1.9<L>  /  TBili  0.9  /  DBili  x   /  AST  16  /  ALT  11  /  AlkPhos  203<H>  10-12    CARDIAC MARKERS ( 11 Oct 2023 05:26 )  x     / x     / <7 U/L / x     / x              I&O's Summary    11 Oct 2023 07:01  -  12 Oct 2023 07:00  --------------------------------------------------------  IN: 100 mL / OUT: 1100 mL / NET: -1000 mL      BNP    RADIOLOGY & ADDITIONAL STUDIES:    ECHO:

## 2023-10-12 NOTE — PROVIDER CONTACT NOTE (CRITICAL VALUE NOTIFICATION) - NAME OF MD/NP/PA/DO NOTIFIED:
JUVENAL Santos ( [facility]) JUVENAL Santos ( [facility]) JUVENAL Santos ( [facility]) no

## 2023-10-12 NOTE — DIETITIAN NUTRITION RISK NOTIFICATION - MALNUTRITION EVALUATION AS DEMONSTRATED BY (ADULTS > 20 YEARS OF AGE)
Loss of subcutaneous fat.../Loss of muscle...
Inadequate energy intake.../Loss of subcutaneous fat.../Loss of muscle...

## 2023-10-12 NOTE — PROGRESS NOTE ADULT - CONVERSATION DETAILS
Called and spoke w/ surekha today as a follow up, she is unable to be here today. We discussed Liam's condition today , not much improvement , still mostly confused , and no change in infection, not resolving, and repeat bld cx's are positive.  Surekha concerned pt may be suffering and she said she did speak with his out pt cardiologist and he reviewed w/ her reasons why he is not a candidate for further invasive testing or any surgery. Surekha stated she is now ready to put the dnr/dni in place, and we discussed feeding tubes again and discussed how this would also not change the ultimate outcome and may lead to other complications, surekha agreed and does not want any feeding tubes used.  I reviewed I would take a verbal consent to complete the molst form now and put these wishes into effect immediately, Surekha in agreement .  Plan to continue to meet and discuss regarding pts HD Called and spoke w/ surekha today as a follow up, she is unable to be here today. We discussed Liam's condition today , not much improvement , still mostly confused , and no change in infection, not resolving, and repeat bld cx's are positive.  Surekha concerned pt may be suffering and she said she did speak with his out pt cardiologist and he reviewed w/ her reasons why he is not a candidate for further invasive testing or any surgery. Surekha stated she is now ready to put the dnr/dni in place, and we discussed feeding tubes again and discussed how this would also not change the ultimate outcome and may lead to other complications, surekha agreed and does not want any feeding tubes used.  I reviewed I would take a verbal consent to complete the molst form now and put these wishes into effect immediately, Surekha in agreement .

## 2023-10-12 NOTE — CHART NOTE - NSCHARTNOTEFT_GEN_A_CORE
Nutrition Follow Up Note  Hospital Course (Per Electronic Medical Record):   Source: Medical Record [X] Patient [X]  Nursing Staff [X]     Diet: DASH/TLC , Renal     Patient noted with poor -fair po 25-50% , requires total assistance with feeding , Patient meeting <50% of assessed needs >5 days , requires encouragement from nursing for meal completion , Patient sleeps a lot , patient may benefit from altered consistency diet , suggest minced moist & add Nepro BID  for an additional 840kcals, 32gms protein , Due to hx of poor po & noted muscle wasting & loss of body fat , new dx of acute severe protein calorie malnutrition due to same , patient receives HD 3 x week , labs reviewed ,     Current Weight: (10/11) 189.3/85.9kg                          (10/11) 191.8/87kg     Pertinent Medications: MEDICATIONS  (STANDING):  apixaban 5 milliGRAM(s) Oral two times a day  aspirin  chewable 81 milliGRAM(s) Oral daily  ceftaroline fosamil IVPB 200 milliGRAM(s) IV Intermittent every 8 hours  ceftaroline fosamil IVPB      chlorhexidine 2% Cloths 1 Application(s) Topical <User Schedule>  DAPTOmycin IVPB 600 milliGRAM(s) IV Intermittent every 48 hours  epoetin val (PROCRIT) Injectable 90076 Unit(s) IV Push <User Schedule>  folic acid 1 milliGRAM(s) Oral daily  hydrocortisone hemorrhoidal Suppository 1 Suppository(s) Rectal two times a day  influenza  Vaccine (HIGH DOSE) 0.7 milliLiter(s) IntraMuscular once  levothyroxine 150 MICROGram(s) Oral daily  mesalamine Suppository 1000 milliGRAM(s) Rectal at bedtime  midodrine 10 milliGRAM(s) Oral every 8 hours  pantoprazole   Suspension 40 milliGRAM(s) Enteral Tube daily  polyethylene glycol 3350 17 Gram(s) Oral daily  senna 2 Tablet(s) Oral at bedtime    MEDICATIONS  (PRN):  acetaminophen     Tablet .. 650 milliGRAM(s) Oral every 6 hours PRN Mild Pain (1 - 3)  aluminum hydroxide/magnesium hydroxide/simethicone Suspension 10 milliLiter(s) Oral every 6 hours PRN dyspepsia  sodium chloride 0.9% lock flush 10 milliLiter(s) IV Push every 1 hour PRN Pre/post blood products, medications, blood draw, and to maintain line patency      Pertinent Labs:  10-12 Na136 mmol/L Glu 102 mg/dL<H> K+ 3.8 mmol/L Cr  3.42 mg/dL<H> BUN 42 mg/dL<H> 10-12 Phos 3.8 mg/dL 10-12 Alb 1.9 g/dL<L>        Skin: Stage II Sacrum , suggest Nephro-Narinder QD      Edema: (+1) generalized , (+2) arm     Last BM: (10/10)      Estimated Needs:   [X] No Change since Previous Assessment      Previous Nutrition Diagnosis: Moderate malnutrition   Nutrition Diagnosis is [X] Ongoing         New Nutrition Diagnosis:    Severe Malnutrition  related to patient's inability to meet <50% nutritional needs > 5 days & muscle wasting / loss of body fat observed .     Interventions:   1. Recommend add Nepro BID & add Nephro-Narinder QD    2. down grade diet consistency to minced moist     Monitoring & Evaluation: will monitor:  [X] Weights   [X] PO Intake   [X] Follow Up (Per Protocol)  [X] Tolerance to Diet Prescription       RD to follow as per Nutrition protocol  Haleigh Grant RDN Nutrition Follow Up Note  Hospital Course (Per Electronic Medical Record):   Source: Medical Record [X] Patient [X]  Nursing Staff [X]     Diet: DASH/TLC , Renal     Patient noted with poor -fair po 25-50% , requires total assistance with feeding , Patient meeting <50% of assessed needs >5 days , requires encouragement from nursing for meal completion , Patient sleeps a lot , patient may benefit from altered consistency diet , suggest minced moist & add Nepro BID  for an additional 840kcals, 32gms protein , Due to hx of poor po & noted muscle wasting & loss of body fat , new dx of acute severe protein calorie malnutrition due to same , patient receives HD 3 x week , labs reviewed ,     Current Weight: (10/11) 189.3/85.9kg                          (10/11) 191.8/87kg     Pertinent Medications: MEDICATIONS  (STANDING):  apixaban 5 milliGRAM(s) Oral two times a day  aspirin  chewable 81 milliGRAM(s) Oral daily  ceftaroline fosamil IVPB 200 milliGRAM(s) IV Intermittent every 8 hours  ceftaroline fosamil IVPB      chlorhexidine 2% Cloths 1 Application(s) Topical <User Schedule>  DAPTOmycin IVPB 600 milliGRAM(s) IV Intermittent every 48 hours  epoetin val (PROCRIT) Injectable 34206 Unit(s) IV Push <User Schedule>  folic acid 1 milliGRAM(s) Oral daily  hydrocortisone hemorrhoidal Suppository 1 Suppository(s) Rectal two times a day  influenza  Vaccine (HIGH DOSE) 0.7 milliLiter(s) IntraMuscular once  levothyroxine 150 MICROGram(s) Oral daily  mesalamine Suppository 1000 milliGRAM(s) Rectal at bedtime  midodrine 10 milliGRAM(s) Oral every 8 hours  pantoprazole   Suspension 40 milliGRAM(s) Enteral Tube daily  polyethylene glycol 3350 17 Gram(s) Oral daily  senna 2 Tablet(s) Oral at bedtime    MEDICATIONS  (PRN):  acetaminophen     Tablet .. 650 milliGRAM(s) Oral every 6 hours PRN Mild Pain (1 - 3)  aluminum hydroxide/magnesium hydroxide/simethicone Suspension 10 milliLiter(s) Oral every 6 hours PRN dyspepsia  sodium chloride 0.9% lock flush 10 milliLiter(s) IV Push every 1 hour PRN Pre/post blood products, medications, blood draw, and to maintain line patency      Pertinent Labs:  10-12 Na136 mmol/L Glu 102 mg/dL<H> K+ 3.8 mmol/L Cr  3.42 mg/dL<H> BUN 42 mg/dL<H> 10-12 Phos 3.8 mg/dL 10-12 Alb 1.9 g/dL<L>        Skin: Stage II Sacrum , suggest Nephro-Narinder QD      Edema: (+1) generalized , (+2) arm     Last BM: (10/10)      Estimated Needs:   [X] No Change since Previous Assessment      Previous Nutrition Diagnosis: Moderate malnutrition   Nutrition Diagnosis is [X] Ongoing         New Nutrition Diagnosis:    Severe Malnutrition  related to patient's inability to meet <50% nutritional needs > 5 days & muscle wasting / loss of body fat observed .     Interventions:   1. Recommend add Nepro BID & add Nephro-Narinder QD    2. down grade diet consistency to minced moist     Monitoring & Evaluation: will monitor:  [X] Weights   [X] PO Intake   [X] Follow Up (Per Protocol)  [X] Tolerance to Diet Prescription       RD to follow as per Nutrition protocol  Haleigh Grant RDN Nutrition Follow Up Note  Hospital Course (Per Electronic Medical Record):   Source: Medical Record [X] Patient [X]  Nursing Staff [X]     Diet: DASH/TLC , Renal     Patient noted with poor -fair po 25-50% , requires total assistance with feeding , Patient meeting <50% of assessed needs >5 days , requires encouragement from nursing for meal completion , Patient sleeps a lot , patient may benefit from altered consistency diet , suggest minced moist & add Nepro BID  for an additional 840kcals, 32gms protein , Due to hx of poor po & noted muscle wasting & loss of body fat , new dx of acute severe protein calorie malnutrition due to same , patient receives HD 3 x week , labs reviewed ,     Current Weight: (10/11) 189.3/85.9kg                          (10/11) 191.8/87kg     Pertinent Medications: MEDICATIONS  (STANDING):  apixaban 5 milliGRAM(s) Oral two times a day  aspirin  chewable 81 milliGRAM(s) Oral daily  ceftaroline fosamil IVPB 200 milliGRAM(s) IV Intermittent every 8 hours  ceftaroline fosamil IVPB      chlorhexidine 2% Cloths 1 Application(s) Topical <User Schedule>  DAPTOmycin IVPB 600 milliGRAM(s) IV Intermittent every 48 hours  epoetin val (PROCRIT) Injectable 11167 Unit(s) IV Push <User Schedule>  folic acid 1 milliGRAM(s) Oral daily  hydrocortisone hemorrhoidal Suppository 1 Suppository(s) Rectal two times a day  influenza  Vaccine (HIGH DOSE) 0.7 milliLiter(s) IntraMuscular once  levothyroxine 150 MICROGram(s) Oral daily  mesalamine Suppository 1000 milliGRAM(s) Rectal at bedtime  midodrine 10 milliGRAM(s) Oral every 8 hours  pantoprazole   Suspension 40 milliGRAM(s) Enteral Tube daily  polyethylene glycol 3350 17 Gram(s) Oral daily  senna 2 Tablet(s) Oral at bedtime    MEDICATIONS  (PRN):  acetaminophen     Tablet .. 650 milliGRAM(s) Oral every 6 hours PRN Mild Pain (1 - 3)  aluminum hydroxide/magnesium hydroxide/simethicone Suspension 10 milliLiter(s) Oral every 6 hours PRN dyspepsia  sodium chloride 0.9% lock flush 10 milliLiter(s) IV Push every 1 hour PRN Pre/post blood products, medications, blood draw, and to maintain line patency      Pertinent Labs:  10-12 Na136 mmol/L Glu 102 mg/dL<H> K+ 3.8 mmol/L Cr  3.42 mg/dL<H> BUN 42 mg/dL<H> 10-12 Phos 3.8 mg/dL 10-12 Alb 1.9 g/dL<L>        Skin: Stage II Sacrum , suggest Nephro-Narinder QD      Edema: (+1) generalized , (+2) arm     Last BM: (10/10)      Estimated Needs:   [X] No Change since Previous Assessment      Previous Nutrition Diagnosis: Moderate malnutrition   Nutrition Diagnosis is [X] Ongoing         New Nutrition Diagnosis:    Severe Malnutrition  related to patient's inability to meet <50% nutritional needs > 5 days & muscle wasting / loss of body fat observed .     Interventions:   1. Recommend add Nepro BID & add Nephro-Narinder QD    2. down grade diet consistency to minced moist     Monitoring & Evaluation: will monitor:  [X] Weights   [X] PO Intake   [X] Follow Up (Per Protocol)  [X] Tolerance to Diet Prescription       RD to follow as per Nutrition protocol  Haleigh Grant RDN Nutrition Follow Up Note  Hospital Course (Per Electronic Medical Record):   Source: Medical Record [X] Patient [X]  Nursing Staff [X]     Diet: DASH/TLC , Renal , pureed     Patient noted with poor -fair po 25-50% , requires total assistance with feeding , Patient meeting <50% of assessed needs >5 days , requires encouragement from nursing for meal completion , Patient sleeps a lot , patient may benefit from add Nepro BID  for an additional 840kcals, 32gms protein , Due to hx of poor po & noted muscle wasting & loss of body fat , new dx of acute severe protein calorie malnutrition due to same , patient receives HD 3 x week , labs reviewed ,     Current Weight: (10/11) 189.3/85.9kg                          (10/11) 191.8/87kg     Pertinent Medications: MEDICATIONS  (STANDING):  apixaban 5 milliGRAM(s) Oral two times a day  aspirin  chewable 81 milliGRAM(s) Oral daily  ceftaroline fosamil IVPB 200 milliGRAM(s) IV Intermittent every 8 hours  ceftaroline fosamil IVPB      chlorhexidine 2% Cloths 1 Application(s) Topical <User Schedule>  DAPTOmycin IVPB 600 milliGRAM(s) IV Intermittent every 48 hours  epoetin val (PROCRIT) Injectable 14611 Unit(s) IV Push <User Schedule>  folic acid 1 milliGRAM(s) Oral daily  hydrocortisone hemorrhoidal Suppository 1 Suppository(s) Rectal two times a day  influenza  Vaccine (HIGH DOSE) 0.7 milliLiter(s) IntraMuscular once  levothyroxine 150 MICROGram(s) Oral daily  mesalamine Suppository 1000 milliGRAM(s) Rectal at bedtime  midodrine 10 milliGRAM(s) Oral every 8 hours  pantoprazole   Suspension 40 milliGRAM(s) Enteral Tube daily  polyethylene glycol 3350 17 Gram(s) Oral daily  senna 2 Tablet(s) Oral at bedtime    MEDICATIONS  (PRN):  acetaminophen     Tablet .. 650 milliGRAM(s) Oral every 6 hours PRN Mild Pain (1 - 3)  aluminum hydroxide/magnesium hydroxide/simethicone Suspension 10 milliLiter(s) Oral every 6 hours PRN dyspepsia  sodium chloride 0.9% lock flush 10 milliLiter(s) IV Push every 1 hour PRN Pre/post blood products, medications, blood draw, and to maintain line patency      Pertinent Labs:  10-12 Na136 mmol/L Glu 102 mg/dL<H> K+ 3.8 mmol/L Cr  3.42 mg/dL<H> BUN 42 mg/dL<H> 10-12 Phos 3.8 mg/dL 10-12 Alb 1.9 g/dL<L>        Skin: Stage II Sacrum , suggest Nephro-Narinder QD      Edema: (+1) generalized , (+2) arm     Last BM: (10/10)      Estimated Needs:   [X] No Change since Previous Assessment      Previous Nutrition Diagnosis: Moderate malnutrition   Nutrition Diagnosis is [X] Ongoing         New Nutrition Diagnosis:    Severe Malnutrition  related to patient's inability to meet <50% nutritional needs > 5 days & muscle wasting / loss of body fat observed .     Interventions:   1. Recommend add Nepro BID & add Nephro-Narinder QD        Monitoring & Evaluation: will monitor:  [X] Weights   [X] PO Intake   [X] Follow Up (Per Protocol)  [X] Tolerance to Diet Prescription       RD to follow as per Nutrition protocol  Haleigh Grant RDN Nutrition Follow Up Note  Hospital Course (Per Electronic Medical Record):   Source: Medical Record [X] Patient [X]  Nursing Staff [X]     Diet: DASH/TLC , Renal , pureed     Patient noted with poor -fair po 25-50% , requires total assistance with feeding , Patient meeting <50% of assessed needs >5 days , requires encouragement from nursing for meal completion , Patient sleeps a lot , patient may benefit from add Nepro BID  for an additional 840kcals, 32gms protein , Due to hx of poor po & noted muscle wasting & loss of body fat , new dx of acute severe protein calorie malnutrition due to same , patient receives HD 3 x week , labs reviewed ,     Current Weight: (10/11) 189.3/85.9kg                          (10/11) 191.8/87kg     Pertinent Medications: MEDICATIONS  (STANDING):  apixaban 5 milliGRAM(s) Oral two times a day  aspirin  chewable 81 milliGRAM(s) Oral daily  ceftaroline fosamil IVPB 200 milliGRAM(s) IV Intermittent every 8 hours  ceftaroline fosamil IVPB      chlorhexidine 2% Cloths 1 Application(s) Topical <User Schedule>  DAPTOmycin IVPB 600 milliGRAM(s) IV Intermittent every 48 hours  epoetin val (PROCRIT) Injectable 09593 Unit(s) IV Push <User Schedule>  folic acid 1 milliGRAM(s) Oral daily  hydrocortisone hemorrhoidal Suppository 1 Suppository(s) Rectal two times a day  influenza  Vaccine (HIGH DOSE) 0.7 milliLiter(s) IntraMuscular once  levothyroxine 150 MICROGram(s) Oral daily  mesalamine Suppository 1000 milliGRAM(s) Rectal at bedtime  midodrine 10 milliGRAM(s) Oral every 8 hours  pantoprazole   Suspension 40 milliGRAM(s) Enteral Tube daily  polyethylene glycol 3350 17 Gram(s) Oral daily  senna 2 Tablet(s) Oral at bedtime    MEDICATIONS  (PRN):  acetaminophen     Tablet .. 650 milliGRAM(s) Oral every 6 hours PRN Mild Pain (1 - 3)  aluminum hydroxide/magnesium hydroxide/simethicone Suspension 10 milliLiter(s) Oral every 6 hours PRN dyspepsia  sodium chloride 0.9% lock flush 10 milliLiter(s) IV Push every 1 hour PRN Pre/post blood products, medications, blood draw, and to maintain line patency      Pertinent Labs:  10-12 Na136 mmol/L Glu 102 mg/dL<H> K+ 3.8 mmol/L Cr  3.42 mg/dL<H> BUN 42 mg/dL<H> 10-12 Phos 3.8 mg/dL 10-12 Alb 1.9 g/dL<L>        Skin: Stage II Sacrum , suggest Nephro-Narinder QD      Edema: (+1) generalized , (+2) arm     Last BM: (10/10)      Estimated Needs:   [X] No Change since Previous Assessment      Previous Nutrition Diagnosis: Moderate malnutrition   Nutrition Diagnosis is [X] Ongoing         New Nutrition Diagnosis:    Severe Malnutrition  related to patient's inability to meet <50% nutritional needs > 5 days & muscle wasting / loss of body fat observed .     Interventions:   1. Recommend add Nepro BID & add Nephro-Narinder QD        Monitoring & Evaluation: will monitor:  [X] Weights   [X] PO Intake   [X] Follow Up (Per Protocol)  [X] Tolerance to Diet Prescription       RD to follow as per Nutrition protocol  Haleigh Grant RDN Nutrition Follow Up Note  Hospital Course (Per Electronic Medical Record):   Source: Medical Record [X] Patient [X]  Nursing Staff [X]     Diet: DASH/TLC , Renal , pureed     Patient noted with poor -fair po 25-50% , requires total assistance with feeding , Patient meeting <50% of assessed needs >5 days , requires encouragement from nursing for meal completion , Patient sleeps a lot , patient may benefit from add Nepro BID  for an additional 840kcals, 32gms protein , Due to hx of poor po & noted muscle wasting & loss of body fat , new dx of acute severe protein calorie malnutrition due to same , patient receives HD 3 x week , labs reviewed ,     Current Weight: (10/11) 189.3/85.9kg                          (10/11) 191.8/87kg     Pertinent Medications: MEDICATIONS  (STANDING):  apixaban 5 milliGRAM(s) Oral two times a day  aspirin  chewable 81 milliGRAM(s) Oral daily  ceftaroline fosamil IVPB 200 milliGRAM(s) IV Intermittent every 8 hours  ceftaroline fosamil IVPB      chlorhexidine 2% Cloths 1 Application(s) Topical <User Schedule>  DAPTOmycin IVPB 600 milliGRAM(s) IV Intermittent every 48 hours  epoetin val (PROCRIT) Injectable 26286 Unit(s) IV Push <User Schedule>  folic acid 1 milliGRAM(s) Oral daily  hydrocortisone hemorrhoidal Suppository 1 Suppository(s) Rectal two times a day  influenza  Vaccine (HIGH DOSE) 0.7 milliLiter(s) IntraMuscular once  levothyroxine 150 MICROGram(s) Oral daily  mesalamine Suppository 1000 milliGRAM(s) Rectal at bedtime  midodrine 10 milliGRAM(s) Oral every 8 hours  pantoprazole   Suspension 40 milliGRAM(s) Enteral Tube daily  polyethylene glycol 3350 17 Gram(s) Oral daily  senna 2 Tablet(s) Oral at bedtime    MEDICATIONS  (PRN):  acetaminophen     Tablet .. 650 milliGRAM(s) Oral every 6 hours PRN Mild Pain (1 - 3)  aluminum hydroxide/magnesium hydroxide/simethicone Suspension 10 milliLiter(s) Oral every 6 hours PRN dyspepsia  sodium chloride 0.9% lock flush 10 milliLiter(s) IV Push every 1 hour PRN Pre/post blood products, medications, blood draw, and to maintain line patency      Pertinent Labs:  10-12 Na136 mmol/L Glu 102 mg/dL<H> K+ 3.8 mmol/L Cr  3.42 mg/dL<H> BUN 42 mg/dL<H> 10-12 Phos 3.8 mg/dL 10-12 Alb 1.9 g/dL<L>        Skin: Stage II Sacrum , suggest Nephro-Narinder QD      Edema: (+1) generalized , (+2) arm     Last BM: (10/10)      Estimated Needs:   [X] No Change since Previous Assessment      Previous Nutrition Diagnosis: Moderate malnutrition   Nutrition Diagnosis is [X] Ongoing         New Nutrition Diagnosis:    Severe Malnutrition  related to patient's inability to meet <50% nutritional needs > 5 days & muscle wasting / loss of body fat observed .     Interventions:   1. Recommend add Nepro BID & add Nephro-Narinder QD        Monitoring & Evaluation: will monitor:  [X] Weights   [X] PO Intake   [X] Follow Up (Per Protocol)  [X] Tolerance to Diet Prescription       RD to follow as per Nutrition protocol  Haleigh Grant RDN

## 2023-10-12 NOTE — PROGRESS NOTE ADULT - ATTENDING COMMENTS
Patient seen and examined at bedside. No acute overnight events.    Vitals reviewed  Physical exam remarkable for Right sided dialysis catheter, edematous extremitites    Pt w/ persistent MRSA Bacteremia, w/ repeat blood cultures positive  High concern for Endocarditis, however, performing ERENDIRA will not . Not a candidate for valvular surgery  Continue Abx  ID on board  Cardiology on board

## 2023-10-12 NOTE — PROGRESS NOTE ADULT - SUBJECTIVE AND OBJECTIVE BOX
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 On repeated questioning of how he was doing, he cont'd to attempt to talk about this place & nurses & not about himself    REVIEW OF SYSTEMS:  unobtainable - as above     Antimicrobials Day #  : 12  ceftaroline fosamil IVPB      ceftaroline fosamil IVPB 200 milliGRAM(s) IV Intermittent every 8 hours  DAPTOmycin IVPB 600 milliGRAM(s) IV Intermittent every 48 hours    Other Medications Reviewed    T(F): 97.2 (10-12-23 @ 13:12), Max: 97.6 (10-11-23 @ 17:55)  HR: 85 (10-12-23 @ 13:54)  BP: 100/53 (10-12-23 @ 13:54)  RR: 18 (10-12-23 @ 13:12)  SpO2: 93% (10-12-23 @ 13:12)  Wt(kg): --    PHYSICAL EXAM:  General: alert, no acute distress  Eyes:  anicteric, no conjunctival injection, no discharge  Oropharynx: no lesions or injection 	  Neck: supple. [facility]   Lungs: clear to auscultation  Heart: regular rate and rhythm; no murmurs  Abdomen: soft, nondistended, nontender  Extremities: no clubbing, cyanosis, or edema of LE  Neurologic: awake, attempts to interact but provides no info.     LAB RESULTS:                        7.4    16.12 )-----------( 140      ( 12 Oct 2023 07:13 )             22.1     10-12    136  |  100  |  42<H>  ----------------------------<  102<H>  3.8   |  28  |  3.42<H>    Ca    9.1      12 Oct 2023 07:13  Phos  3.8     10-12  Mg     1.7     10-12    TPro  5.8<L>  /  Alb  1.9<L>  /  TBili  0.9  /  DBili  x   /  AST  16  /  ALT  11  /  AlkPhos  203<H>  10-12    LIVER FUNCTIONS - ( 12 Oct 2023 07:13 )  Alb: 1.9 g/dL / Pro: 5.8 g/dL / ALK PHOS: 203 U/L / ALT: 11 U/L / AST: 16 U/L / GGT: x           Urinalysis Basic - ( 12 Oct 2023 07:13 )    Color: x / Appearance: x / SG: x / pH: x  Gluc: 102 mg/dL / Ketone: x  / Bili: x / Urobili: x   Blood: x / Protein: x / Nitrite: x   Leuk Esterase: x / RBC: x / WBC x   Sq Epi: x / Non Sq Epi: x / Bacteria: x      MICROBIOLOGY:  RECENT CULTURES:  10-11 @ 05:52 .Blood Blood-Peripheral     Growth in aerobic bottle: Gram Positive Cocci in Clusters  Growth in anaerobic bottle: Gram Positive Cocci in Clusters    Growth in aerobic bottle: Gram Positive Cocci in Clusters  Growth in anaerobic bottle: Gram Positive Cocci in Clusters    10-11 @ 05:26 .Blood Blood-Peripheral     Growth in aerobic bottle: Gram Positive Cocci in Clusters    Growth in aerobic bottle: Gram Positive Cocci in Clusters    10-09 @ 07:00 .Blood Blood Methicillin resistant Staphylococcus aureus    Growth in aerobic and anaerobic bottles: Methicillin Resistant  Staphylococcus aureus  See previous culture [id number]    Growth in aerobic bottle: Gram Positive Cocci in Clusters  Growth in anaerobic bottle: Gram Positive Cocci in Clusters              RADIOLOGY REVIEWED:     CC: 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 On repeated questioning of how he was doing, he cont'd to attempt to talk about this place & nurses & not about himself    REVIEW OF SYSTEMS:  unobtainable - as above     Antimicrobials Day #  : 12  ceftaroline fosamil IVPB      ceftaroline fosamil IVPB 200 milliGRAM(s) IV Intermittent every 8 hours  DAPTOmycin IVPB 600 milliGRAM(s) IV Intermittent every 48 hours    Other Medications Reviewed    T(F): 97.2 (10-12-23 @ 13:12), Max: 97.6 (10-11-23 @ 17:55)  HR: 85 (10-12-23 @ 13:54)  BP: 100/53 (10-12-23 @ 13:54)  RR: 18 (10-12-23 @ 13:12)  SpO2: 93% (10-12-23 @ 13:12)  Wt(kg): --    PHYSICAL EXAM:  General: alert, no acute distress  Eyes:  anicteric, no conjunctival injection, no discharge  Oropharynx: no lesions or injection 	  Neck: supple. [facility]   Lungs: clear to auscultation  Heart: regular rate and rhythm; no murmurs  Abdomen: soft, nondistended, nontender  Extremities: no clubbing, cyanosis, or edema of LE  Neurologic: awake, attempts to interact but provides no info.     LAB RESULTS:                        7.4    16.12 )-----------( 140      ( 12 Oct 2023 07:13 )             22.1     10-12    136  |  100  |  42<H>  ----------------------------<  102<H>  3.8   |  28  |  3.42<H>    Ca    9.1      12 Oct 2023 07:13  Phos  3.8     10-12  Mg     1.7     10-12    TPro  5.8<L>  /  Alb  1.9<L>  /  TBili  0.9  /  DBili  x   /  AST  16  /  ALT  11  /  AlkPhos  203<H>  10-12    LIVER FUNCTIONS - ( 12 Oct 2023 07:13 )  Alb: 1.9 g/dL / Pro: 5.8 g/dL / ALK PHOS: 203 U/L / ALT: 11 U/L / AST: 16 U/L / GGT: x           Urinalysis Basic - ( 12 Oct 2023 07:13 )    Color: x / Appearance: x / SG: x / pH: x  Gluc: 102 mg/dL / Ketone: x  / Bili: x / Urobili: x   Blood: x / Protein: x / Nitrite: x   Leuk Esterase: x / RBC: x / WBC x   Sq Epi: x / Non Sq Epi: x / Bacteria: x      MICROBIOLOGY:  RECENT CULTURES:  10-11 @ 05:52 .Blood Blood-Peripheral     Growth in aerobic bottle: Gram Positive Cocci in Clusters  Growth in anaerobic bottle: Gram Positive Cocci in Clusters    Growth in aerobic bottle: Gram Positive Cocci in Clusters  Growth in anaerobic bottle: Gram Positive Cocci in Clusters    10-11 @ 05:26 .Blood Blood-Peripheral     Growth in aerobic bottle: Gram Positive Cocci in Clusters    Growth in aerobic bottle: Gram Positive Cocci in Clusters    10-09 @ 07:00 .Blood Blood Methicillin resistant Staphylococcus aureus    Growth in aerobic and anaerobic bottles: Methicillin Resistant  Staphylococcus aureus  See previous culture [id number]    Growth in aerobic bottle: Gram Positive Cocci in Clusters  Growth in anaerobic bottle: Gram Positive Cocci in Clusters              RADIOLOGY REVIEWED:     CC: 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 On repeated questioning of how he was doing, he cont'd to attempt to talk about this place & nurses & not about himself    REVIEW OF SYSTEMS:  unobtainable - as above     Antimicrobials Day #  : 12  ceftaroline fosamil IVPB      ceftaroline fosamil IVPB 200 milliGRAM(s) IV Intermittent every 8 hours  DAPTOmycin IVPB 600 milliGRAM(s) IV Intermittent every 48 hours    Other Medications Reviewed    T(F): 97.2 (10-12-23 @ 13:12), Max: 97.6 (10-11-23 @ 17:55)  HR: 85 (10-12-23 @ 13:54)  BP: 100/53 (10-12-23 @ 13:54)  RR: 18 (10-12-23 @ 13:12)  SpO2: 93% (10-12-23 @ 13:12)  Wt(kg): --    PHYSICAL EXAM:  General: alert, no acute distress  Eyes:  anicteric, no conjunctival injection, no discharge  Oropharynx: no lesions or injection 	  Neck: supple. [facility]   Lungs: clear to auscultation  Heart: regular rate and rhythm; no murmurs  Abdomen: soft, nondistended, nontender  Extremities: no clubbing, cyanosis, or edema of LE  Neurologic: awake, attempts to interact but provides no info.     LAB RESULTS:                        7.4    16.12 )-----------( 140      ( 12 Oct 2023 07:13 )             22.1     10-12    136  |  100  |  42<H>  ----------------------------<  102<H>  3.8   |  28  |  3.42<H>    Ca    9.1      12 Oct 2023 07:13  Phos  3.8     10-12  Mg     1.7     10-12    TPro  5.8<L>  /  Alb  1.9<L>  /  TBili  0.9  /  DBili  x   /  AST  16  /  ALT  11  /  AlkPhos  203<H>  10-12    LIVER FUNCTIONS - ( 12 Oct 2023 07:13 )  Alb: 1.9 g/dL / Pro: 5.8 g/dL / ALK PHOS: 203 U/L / ALT: 11 U/L / AST: 16 U/L / GGT: x           Urinalysis Basic - ( 12 Oct 2023 07:13 )    Color: x / Appearance: x / SG: x / pH: x  Gluc: 102 mg/dL / Ketone: x  / Bili: x / Urobili: x   Blood: x / Protein: x / Nitrite: x   Leuk Esterase: x / RBC: x / WBC x   Sq Epi: x / Non Sq Epi: x / Bacteria: x      MICROBIOLOGY:  RECENT CULTURES:  10-11 @ 05:52 .Blood Blood-Peripheral     Growth in aerobic bottle: Gram Positive Cocci in Clusters  Growth in anaerobic bottle: Gram Positive Cocci in Clusters    Growth in aerobic bottle: Gram Positive Cocci in Clusters  Growth in anaerobic bottle: Gram Positive Cocci in Clusters    10-11 @ 05:26 .Blood Blood-Peripheral     Growth in aerobic bottle: Gram Positive Cocci in Clusters    Growth in aerobic bottle: Gram Positive Cocci in Clusters    10-09 @ 07:00 .Blood Blood Methicillin resistant Staphylococcus aureus    Growth in aerobic and anaerobic bottles: Methicillin Resistant  Staphylococcus aureus  See previous culture [id number]    Growth in aerobic bottle: Gram Positive Cocci in Clusters  Growth in anaerobic bottle: Gram Positive Cocci in Clusters              RADIOLOGY REVIEWED:

## 2023-10-12 NOTE — PROGRESS NOTE ADULT - SUBJECTIVE AND OBJECTIVE BOX
Follow-up Pulmonary Progress Note  Chief Complaint : Pneumonia due to infectious organism      patient seen and examined  comfortable  alert  confused  unable to provide history  appears more alert than monday when last seen and more interactive        Allergies :levofloxacin (Unknown)  alfuzosin (Unknown)  tamsulosin (Unknown)  Myrbetriq (Unknown)      PAST MEDICAL & SURGICAL HISTORY:  Chronic atrial fibrillation    History of BPH    CAD (coronary artery disease)    Coronary stent patent    ESRD on dialysis    History of GI bleed    Mitral valve replaced        Medications:  MEDICATIONS  (STANDING):  apixaban 5 milliGRAM(s) Oral two times a day  aspirin  chewable 81 milliGRAM(s) Oral daily  ceftaroline fosamil IVPB 200 milliGRAM(s) IV Intermittent every 8 hours  ceftaroline fosamil IVPB      chlorhexidine 2% Cloths 1 Application(s) Topical <User Schedule>  DAPTOmycin IVPB 600 milliGRAM(s) IV Intermittent every 48 hours  epoetin val (PROCRIT) Injectable 73011 Unit(s) IV Push <User Schedule>  folic acid 1 milliGRAM(s) Oral daily  hydrocortisone hemorrhoidal Suppository 1 Suppository(s) Rectal two times a day  influenza  Vaccine (HIGH DOSE) 0.7 milliLiter(s) IntraMuscular once  levothyroxine 150 MICROGram(s) Oral daily  mesalamine Suppository 1000 milliGRAM(s) Rectal at bedtime  midodrine 10 milliGRAM(s) Oral every 8 hours  pantoprazole   Suspension 40 milliGRAM(s) Enteral Tube daily  polyethylene glycol 3350 17 Gram(s) Oral daily  senna 2 Tablet(s) Oral at bedtime    MEDICATIONS  (PRN):  acetaminophen     Tablet .. 650 milliGRAM(s) Oral every 6 hours PRN Mild Pain (1 - 3)  aluminum hydroxide/magnesium hydroxide/simethicone Suspension 10 milliLiter(s) Oral every 6 hours PRN dyspepsia  sodium chloride 0.9% lock flush 10 milliLiter(s) IV Push every 1 hour PRN Pre/post blood products, medications, blood draw, and to maintain line patency      Antibiotics History  caspofungin IVPB    , 10-01-23 @ 08:49  caspofungin IVPB 70 milliGRAM(s) IV Intermittent once, 10-01-23 @ 08:48  caspofungin IVPB 50 milliGRAM(s) IV Intermittent every 24 hours, 10-02-23 @ 08:48  cefepime   IVPB 1000 milliGRAM(s) IV Intermittent once, 09-30-23 @ 19:12, Stop order after: 1 Doses  ceftaroline fosamil IVPB 200 milliGRAM(s) IV Intermittent every 12 hours, 10-08-23 @ 18:00  ceftaroline fosamil IVPB    , 10-08-23 @ 10:32  ceftaroline fosamil IVPB 200 milliGRAM(s) IV Intermittent once, 10-09-23 @ 09:43  ceftaroline fosamil IVPB    , 10-09-23 @ 15:43  ceftaroline fosamil IVPB 200 milliGRAM(s) IV Intermittent once, 10-08-23 @ 08:35  ceftaroline fosamil IVPB 200 milliGRAM(s) IV Intermittent every 8 hours, 10-09-23 @ 14:00  DAPTOmycin IVPB 600 milliGRAM(s) IV Intermittent every 48 hours, 10-10-23 @ 15:02, Stop order after: 42 Days  DAPTOmycin IVPB 720 milliGRAM(s) IV Intermittent every 48 hours, 10-09-23 @ 14:10, Stop order after: 42 Days  meropenem  IVPB    , 10-01-23 @ 09:03  meropenem  IVPB 500 milliGRAM(s) IV Intermittent every 24 hours, 10-02-23 @ 09:02, Stop order after: 8 Days  meropenem  IVPB 500 milliGRAM(s) IV Intermittent once, 10-01-23 @ 09:02, Stop order after: 1 Doses  piperacillin/tazobactam IVPB.. 3.375 Gram(s) IV Intermittent every 12 hours, 09-30-23 @ 22:40, Stop order after: 7 Days  vancomycin  IVPB    , 10-01-23 @ 09:00  vancomycin  IVPB 750 milliGRAM(s) IV Intermittent once, 10-07-23 @ 08:15, Stop order after: 1 Doses  vancomycin  IVPB 1000 milliGRAM(s) IV Intermittent every 24 hours, 10-03-23 @ 00:00  vancomycin  IVPB 1000 milliGRAM(s) IV Intermittent once, 10-01-23 @ 09:00, Stop order after: 1 Doses  vancomycin  IVPB 1000 milliGRAM(s) IV Intermittent every 24 hours, 10-02-23 @ 09:00, Stop order after: 1 Doses  vancomycin  IVPB 1000 milliGRAM(s) IV Intermittent once, 10-04-23 @ 08:29, Stop order after: 1 Doses  vancomycin  IVPB 750 milliGRAM(s) IV Intermittent <User Schedule>, 10-09-23 @ 00:00, Stop order after: 42 Days  vancomycin  IVPB. 1000 milliGRAM(s) IV Intermittent once, 09-30-23 @ 19:35, Stop order after: 1 Doses      Heme Medications   apixaban 5 milliGRAM(s) Oral two times a day, 10-10-23 @ 16:22  aspirin  chewable 81 milliGRAM(s) Oral daily, 10-01-23 @ 12:50      GI Medications  aluminum hydroxide/magnesium hydroxide/simethicone Suspension 10 milliLiter(s) Oral every 6 hours, 10-01-23 @ 13:00, Routine PRN  mesalamine Suppository 1000 milliGRAM(s) Rectal at bedtime, 10-01-23 @ 12:49, Routine  pantoprazole   Suspension 40 milliGRAM(s) Enteral Tube daily, 10-06-23 @ 09:28, Routine  polyethylene glycol 3350 17 Gram(s) Oral daily, 10-01-23 @ 12:36, Routine  senna 2 Tablet(s) Oral at bedtime, 10-01-23 @ 12:36, Routine        LABS:                        7.4    16.12 )-----------( 140      ( 12 Oct 2023 07:13 )             22.1     10-12    136  |  100  |  42<H>  ----------------------------<  102<H>  3.8   |  28  |  3.42<H>    Ca    9.1      12 Oct 2023 07:13  Phos  3.8     10-12  Mg     1.7     10-12    TPro  5.8<L>  /  Alb  1.9<L>  /  TBili  0.9  /  DBili  x   /  AST  16  /  ALT  11  /  AlkPhos  203<H>  10-12           CULTURES: (if applicable)    Culture - Blood (collected 10-11-23 @ 05:52)  Source: .Blood Blood-Peripheral  Gram Stain (10-12-23 @ 09:49):    Growth in aerobic bottle: Gram Positive Cocci in Clusters    Growth in anaerobic bottle: Gram Positive Cocci in Clusters  Preliminary Report (10-12-23 @ 09:50):    Growth in aerobic bottle: Gram Positive Cocci in Clusters    Growth in anaerobic bottle: Gram Positive Cocci in Clusters    Culture - Blood (collected 10-11-23 @ 05:26)  Source: .Blood Blood-Peripheral  Gram Stain (10-12-23 @ 12:13):    Growth in aerobic bottle: Gram Positive Cocci in Clusters  Preliminary Report (10-12-23 @ 12:13):    Growth in aerobic bottle: Gram Positive Cocci in Clusters    Culture - Blood (collected 10-09-23 @ 07:00)  Source: .Blood Blood  Gram Stain (10-10-23 @ 06:14):    Growth in aerobic bottle: Gram Positive Cocci in Clusters    Growth in anaerobic bottle: Gram Positive Cocci in Clusters  Final Report (10-11-23 @ 12:08):    Growth in aerobic and anaerobic bottles: Methicillin Resistant    Staphylococcus aureus    See previous culture [id number]    Culture - Blood (collected 10-09-23 @ 07:00)  Source: .Blood Blood  Gram Stain (10-10-23 @ 09:30):    Growth in aerobic bottle: Gram Positive Cocci in Clusters    Growth in anaerobic bottle: Gram Positive Cocci in Clusters  Final Report (10-11-23 @ 14:50):    Growth in aerobic and anaerobic bottles: Methicillin Resistant    Staphylococcus aureus  Organism: Methicillin resistant Staphylococcus aureus (10-11-23 @ 14:50)  Organism: Methicillin resistant Staphylococcus aureus (10-11-23 @ 14:50)      -  Clindamycin: R >4      -  Oxacillin: R >2      -  Gentamicin: S <=1 Should not be used as monotherapy      -  Daptomycin: S 1      -  Linezolid: S 2      -  Cefazolin: R >16      -  Vancomycin: S 2      -  Tetracycline: S <=1      Method Type: LEWIS      -  Ampicillin/Sulbactam: R <=8/4      -  Penicillin: R >8      -  Rifampin: S <=1 Should not be used as monotherapy      -  Erythromycin: R >4      -  Trimethoprim/Sulfamethoxazole: S <=0.5/9.5    Culture - Blood (collected 10-06-23 @ 10:20)  Source: .Blood Blood-Peripheral  Gram Stain (10-07-23 @ 04:46):    Growth in aerobic bottle: Gram Positive Cocci in Clusters    Growth in anaerobic bottle: Gram Positive Cocci in Clusters  Final Report (10-08-23 @ 08:18):    Growth in aerobic and anaerobic bottles: Methicillin Resistant    Staphylococcus aureus    See previous culture [id number]    Culture - Blood (collected 10-04-23 @ 06:00)  Source: .Blood Blood  Gram Stain (10-05-23 @ 08:18):    Growth in aerobic bottle: Gram Positive Cocci in Clusters    Growth in anaerobic bottle: Gram Positive Cocci in Clusters  Final Report (10-06-23 @ 14:47):    Growth in aerobic and anaerobic bottles: Methicillin Resistant    Staphylococcus aureus  Organism: Methicillin resistant Staphylococcus aureus (10-06-23 @ 14:47)  Organism: Methicillin resistant Staphylococcus aureus (10-06-23 @ 14:47)      -  Clindamycin: R >4      -  Oxacillin: R >2      -  Gentamicin: S <=1 Should not be used as monotherapy      -  Daptomycin: S 1      -  Linezolid: S 2      -  Cefazolin: R >16      -  Vancomycin: S 2      -  Tetracycline: S 2      Method Type: LEWIS      -  Ampicillin/Sulbactam: R <=8/4      -  Penicillin: R 8      -  Rifampin: S <=1 Should not be used as monotherapy      -  Erythromycin: R >4      -  Trimethoprim/Sulfamethoxazole: S <=0.5/9.5    Culture - Blood (collected 10-04-23 @ 06:00)  Source: .Blood Blood  Gram Stain (10-05-23 @ 07:40):    Growth in aerobic bottle: Gram Positive Cocci in Clusters    Growth in anaerobic bottle: Gram Positive Cocci in Clusters  Final Report (10-06-23 @ 14:49):    Growth in aerobic and anaerobic bottles: Methicillin Resistant    Staphylococcus aureus    See previous culture [id number]    Culture - Catheter (collected 10-02-23 @ 16:21)  Source: .Catheter + MRSA Bacterimia Permacath Removal Aerobic/Anaerobic Right  IJ Permacath TIP  Final Report (10-05-23 @ 14:50):    Greater than or equal to 15 Colonies Methicillin Resistant Staphylococcus    aureus  Organism: Methicillin resistant Staphylococcus aureus (10-05-23 @ 14:50)  Organism: Methicillin resistant Staphylococcus aureus (10-05-23 @ 14:50)      -  Clindamycin: R >4      -  Oxacillin: R >2      -  Gentamicin: S <=1 Should not be used as monotherapy      -  Daptomycin: S 1      -  Linezolid: S 4      -  Cefazolin: R >16      -  Vancomycin: S 2      -  Tetracycline: S 2      Method Type: LEWIS      -  Ampicillin/Sulbactam: R 16/8      -  Penicillin: R >8      -  Rifampin: S <=1 Should not be used as monotherapy      -  Erythromycin: R >4      -  Trimethoprim/Sulfamethoxazole: S <=0.5/9.5    Culture - Blood (collected 09-30-23 @ 19:15)  Source: .Blood Blood-Peripheral  Gram Stain (10-01-23 @ 11:24):    Growth in anaerobic bottle: Gram Positive Cocci in Clusters    Growth in aerobic bottle: Gram Positive Cocci in Clusters  Final Report (10-03-23 @ 07:40):    Growth in aerobic and anaerobic bottles: Methicillin Resistant    Staphylococcus aureus    See previous culture  [id number]    Culture - Blood (collected 09-30-23 @ 19:10)  Source: .Blood Blood-Peripheral  Gram Stain (10-01-23 @ 11:02):    Growth in anaerobic bottle: Gram Positive Cocci in Clusters    Growth in aerobic bottle: Gram Positive Cocci in Clusters  Final Report (10-03-23 @ 07:39):    Growth in aerobic and anaerobic bottles: Methicillin Resistant    Staphylococcus aureus    Direct identification is available within approximately 3-5    hours either by Blood Panel Multiplexed PCR or Direct    MALDI-TOF. Details: [email or web address]  Organism: Blood Culture PCR  Methicillin resistant Staphylococcus aureus (10-03-23 @ 07:39)  Organism: Methicillin resistant Staphylococcus aureus (10-03-23 @ 07:39)      -  Clindamycin: R >4      -  Oxacillin: R >2      -  Gentamicin: S <=1 Should not be used as monotherapy      -  Daptomycin: S 1      -  Linezolid: S 4      -  Cefazolin: R >16      -  Vancomycin: S 2      -  Tetracycline: S 2      Method Type: LEWIS      -  Ampicillin/Sulbactam: R 16/8      -  Penicillin: R >8      -  Rifampin: S <=1 Should not be used as monotherapy      -  Erythromycin: R >4      -  Trimethoprim/Sulfamethoxazole: S <=0.5/9.5  Organism: Blood Culture PCR (10-03-23 @ 07:39)      Method Type: PCR      -  Methicillin resistant Staphylococcus aureus (MRSA): Detec      Rapid RVP Result: NotDetec (10-01-23 @ 15:45)       RADIOLOGY  CXR:      CT:    ECHO:      VITALS:  T(C): 36.2 (10-12-23 @ 13:12), Max: 36.4 (10-11-23 @ 20:36)  T(F): 97.2 (10-12-23 @ 13:12), Max: 97.6 (10-11-23 @ 20:36)  HR: 85 (10-12-23 @ 13:54) (85 - 110)  BP: 100/53 (10-12-23 @ 13:54) (91/54 - 117/62)  BP(mean): --  ABP: --  ABP(mean): --  RR: 18 (10-12-23 @ 13:12) (18 - 20)  SpO2: 93% (10-12-23 @ 13:12) (93% - 97%)  CVP(mm Hg): --  CVP(cm H2O): --    Ins and Outs     10-11-23 @ 07:01  -  10-12-23 @ 07:00  --------------------------------------------------------  IN: 100 mL / OUT: 1100 mL / NET: -1000 mL                I&O's Detail    11 Oct 2023 07:01  -  12 Oct 2023 07:00  --------------------------------------------------------  IN:    Oral Fluid: 100 mL  Total IN: 100 mL    OUT:    Other (mL): 1100 mL  Total OUT: 1100 mL    Total NET: -1000 mL          Follow-up Pulmonary Progress Note  Chief Complaint : Pneumonia due to infectious organism      patient seen and examined  comfortable  alert  confused  unable to provide history  appears more alert than monday when last seen and more interactive        Allergies :levofloxacin (Unknown)  alfuzosin (Unknown)  tamsulosin (Unknown)  Myrbetriq (Unknown)      PAST MEDICAL & SURGICAL HISTORY:  Chronic atrial fibrillation    History of BPH    CAD (coronary artery disease)    Coronary stent patent    ESRD on dialysis    History of GI bleed    Mitral valve replaced        Medications:  MEDICATIONS  (STANDING):  apixaban 5 milliGRAM(s) Oral two times a day  aspirin  chewable 81 milliGRAM(s) Oral daily  ceftaroline fosamil IVPB 200 milliGRAM(s) IV Intermittent every 8 hours  ceftaroline fosamil IVPB      chlorhexidine 2% Cloths 1 Application(s) Topical <User Schedule>  DAPTOmycin IVPB 600 milliGRAM(s) IV Intermittent every 48 hours  epoetin val (PROCRIT) Injectable 45647 Unit(s) IV Push <User Schedule>  folic acid 1 milliGRAM(s) Oral daily  hydrocortisone hemorrhoidal Suppository 1 Suppository(s) Rectal two times a day  influenza  Vaccine (HIGH DOSE) 0.7 milliLiter(s) IntraMuscular once  levothyroxine 150 MICROGram(s) Oral daily  mesalamine Suppository 1000 milliGRAM(s) Rectal at bedtime  midodrine 10 milliGRAM(s) Oral every 8 hours  pantoprazole   Suspension 40 milliGRAM(s) Enteral Tube daily  polyethylene glycol 3350 17 Gram(s) Oral daily  senna 2 Tablet(s) Oral at bedtime    MEDICATIONS  (PRN):  acetaminophen     Tablet .. 650 milliGRAM(s) Oral every 6 hours PRN Mild Pain (1 - 3)  aluminum hydroxide/magnesium hydroxide/simethicone Suspension 10 milliLiter(s) Oral every 6 hours PRN dyspepsia  sodium chloride 0.9% lock flush 10 milliLiter(s) IV Push every 1 hour PRN Pre/post blood products, medications, blood draw, and to maintain line patency      Antibiotics History  caspofungin IVPB    , 10-01-23 @ 08:49  caspofungin IVPB 70 milliGRAM(s) IV Intermittent once, 10-01-23 @ 08:48  caspofungin IVPB 50 milliGRAM(s) IV Intermittent every 24 hours, 10-02-23 @ 08:48  cefepime   IVPB 1000 milliGRAM(s) IV Intermittent once, 09-30-23 @ 19:12, Stop order after: 1 Doses  ceftaroline fosamil IVPB 200 milliGRAM(s) IV Intermittent every 12 hours, 10-08-23 @ 18:00  ceftaroline fosamil IVPB    , 10-08-23 @ 10:32  ceftaroline fosamil IVPB 200 milliGRAM(s) IV Intermittent once, 10-09-23 @ 09:43  ceftaroline fosamil IVPB    , 10-09-23 @ 15:43  ceftaroline fosamil IVPB 200 milliGRAM(s) IV Intermittent once, 10-08-23 @ 08:35  ceftaroline fosamil IVPB 200 milliGRAM(s) IV Intermittent every 8 hours, 10-09-23 @ 14:00  DAPTOmycin IVPB 600 milliGRAM(s) IV Intermittent every 48 hours, 10-10-23 @ 15:02, Stop order after: 42 Days  DAPTOmycin IVPB 720 milliGRAM(s) IV Intermittent every 48 hours, 10-09-23 @ 14:10, Stop order after: 42 Days  meropenem  IVPB    , 10-01-23 @ 09:03  meropenem  IVPB 500 milliGRAM(s) IV Intermittent every 24 hours, 10-02-23 @ 09:02, Stop order after: 8 Days  meropenem  IVPB 500 milliGRAM(s) IV Intermittent once, 10-01-23 @ 09:02, Stop order after: 1 Doses  piperacillin/tazobactam IVPB.. 3.375 Gram(s) IV Intermittent every 12 hours, 09-30-23 @ 22:40, Stop order after: 7 Days  vancomycin  IVPB    , 10-01-23 @ 09:00  vancomycin  IVPB 750 milliGRAM(s) IV Intermittent once, 10-07-23 @ 08:15, Stop order after: 1 Doses  vancomycin  IVPB 1000 milliGRAM(s) IV Intermittent every 24 hours, 10-03-23 @ 00:00  vancomycin  IVPB 1000 milliGRAM(s) IV Intermittent once, 10-01-23 @ 09:00, Stop order after: 1 Doses  vancomycin  IVPB 1000 milliGRAM(s) IV Intermittent every 24 hours, 10-02-23 @ 09:00, Stop order after: 1 Doses  vancomycin  IVPB 1000 milliGRAM(s) IV Intermittent once, 10-04-23 @ 08:29, Stop order after: 1 Doses  vancomycin  IVPB 750 milliGRAM(s) IV Intermittent <User Schedule>, 10-09-23 @ 00:00, Stop order after: 42 Days  vancomycin  IVPB. 1000 milliGRAM(s) IV Intermittent once, 09-30-23 @ 19:35, Stop order after: 1 Doses      Heme Medications   apixaban 5 milliGRAM(s) Oral two times a day, 10-10-23 @ 16:22  aspirin  chewable 81 milliGRAM(s) Oral daily, 10-01-23 @ 12:50      GI Medications  aluminum hydroxide/magnesium hydroxide/simethicone Suspension 10 milliLiter(s) Oral every 6 hours, 10-01-23 @ 13:00, Routine PRN  mesalamine Suppository 1000 milliGRAM(s) Rectal at bedtime, 10-01-23 @ 12:49, Routine  pantoprazole   Suspension 40 milliGRAM(s) Enteral Tube daily, 10-06-23 @ 09:28, Routine  polyethylene glycol 3350 17 Gram(s) Oral daily, 10-01-23 @ 12:36, Routine  senna 2 Tablet(s) Oral at bedtime, 10-01-23 @ 12:36, Routine        LABS:                        7.4    16.12 )-----------( 140      ( 12 Oct 2023 07:13 )             22.1     10-12    136  |  100  |  42<H>  ----------------------------<  102<H>  3.8   |  28  |  3.42<H>    Ca    9.1      12 Oct 2023 07:13  Phos  3.8     10-12  Mg     1.7     10-12    TPro  5.8<L>  /  Alb  1.9<L>  /  TBili  0.9  /  DBili  x   /  AST  16  /  ALT  11  /  AlkPhos  203<H>  10-12           CULTURES: (if applicable)    Culture - Blood (collected 10-11-23 @ 05:52)  Source: .Blood Blood-Peripheral  Gram Stain (10-12-23 @ 09:49):    Growth in aerobic bottle: Gram Positive Cocci in Clusters    Growth in anaerobic bottle: Gram Positive Cocci in Clusters  Preliminary Report (10-12-23 @ 09:50):    Growth in aerobic bottle: Gram Positive Cocci in Clusters    Growth in anaerobic bottle: Gram Positive Cocci in Clusters    Culture - Blood (collected 10-11-23 @ 05:26)  Source: .Blood Blood-Peripheral  Gram Stain (10-12-23 @ 12:13):    Growth in aerobic bottle: Gram Positive Cocci in Clusters  Preliminary Report (10-12-23 @ 12:13):    Growth in aerobic bottle: Gram Positive Cocci in Clusters    Culture - Blood (collected 10-09-23 @ 07:00)  Source: .Blood Blood  Gram Stain (10-10-23 @ 06:14):    Growth in aerobic bottle: Gram Positive Cocci in Clusters    Growth in anaerobic bottle: Gram Positive Cocci in Clusters  Final Report (10-11-23 @ 12:08):    Growth in aerobic and anaerobic bottles: Methicillin Resistant    Staphylococcus aureus    See previous culture [id number]    Culture - Blood (collected 10-09-23 @ 07:00)  Source: .Blood Blood  Gram Stain (10-10-23 @ 09:30):    Growth in aerobic bottle: Gram Positive Cocci in Clusters    Growth in anaerobic bottle: Gram Positive Cocci in Clusters  Final Report (10-11-23 @ 14:50):    Growth in aerobic and anaerobic bottles: Methicillin Resistant    Staphylococcus aureus  Organism: Methicillin resistant Staphylococcus aureus (10-11-23 @ 14:50)  Organism: Methicillin resistant Staphylococcus aureus (10-11-23 @ 14:50)      -  Clindamycin: R >4      -  Oxacillin: R >2      -  Gentamicin: S <=1 Should not be used as monotherapy      -  Daptomycin: S 1      -  Linezolid: S 2      -  Cefazolin: R >16      -  Vancomycin: S 2      -  Tetracycline: S <=1      Method Type: LEWIS      -  Ampicillin/Sulbactam: R <=8/4      -  Penicillin: R >8      -  Rifampin: S <=1 Should not be used as monotherapy      -  Erythromycin: R >4      -  Trimethoprim/Sulfamethoxazole: S <=0.5/9.5    Culture - Blood (collected 10-06-23 @ 10:20)  Source: .Blood Blood-Peripheral  Gram Stain (10-07-23 @ 04:46):    Growth in aerobic bottle: Gram Positive Cocci in Clusters    Growth in anaerobic bottle: Gram Positive Cocci in Clusters  Final Report (10-08-23 @ 08:18):    Growth in aerobic and anaerobic bottles: Methicillin Resistant    Staphylococcus aureus    See previous culture [id number]    Culture - Blood (collected 10-04-23 @ 06:00)  Source: .Blood Blood  Gram Stain (10-05-23 @ 08:18):    Growth in aerobic bottle: Gram Positive Cocci in Clusters    Growth in anaerobic bottle: Gram Positive Cocci in Clusters  Final Report (10-06-23 @ 14:47):    Growth in aerobic and anaerobic bottles: Methicillin Resistant    Staphylococcus aureus  Organism: Methicillin resistant Staphylococcus aureus (10-06-23 @ 14:47)  Organism: Methicillin resistant Staphylococcus aureus (10-06-23 @ 14:47)      -  Clindamycin: R >4      -  Oxacillin: R >2      -  Gentamicin: S <=1 Should not be used as monotherapy      -  Daptomycin: S 1      -  Linezolid: S 2      -  Cefazolin: R >16      -  Vancomycin: S 2      -  Tetracycline: S 2      Method Type: LEWIS      -  Ampicillin/Sulbactam: R <=8/4      -  Penicillin: R 8      -  Rifampin: S <=1 Should not be used as monotherapy      -  Erythromycin: R >4      -  Trimethoprim/Sulfamethoxazole: S <=0.5/9.5    Culture - Blood (collected 10-04-23 @ 06:00)  Source: .Blood Blood  Gram Stain (10-05-23 @ 07:40):    Growth in aerobic bottle: Gram Positive Cocci in Clusters    Growth in anaerobic bottle: Gram Positive Cocci in Clusters  Final Report (10-06-23 @ 14:49):    Growth in aerobic and anaerobic bottles: Methicillin Resistant    Staphylococcus aureus    See previous culture [id number]    Culture - Catheter (collected 10-02-23 @ 16:21)  Source: .Catheter + MRSA Bacterimia Permacath Removal Aerobic/Anaerobic Right  IJ Permacath TIP  Final Report (10-05-23 @ 14:50):    Greater than or equal to 15 Colonies Methicillin Resistant Staphylococcus    aureus  Organism: Methicillin resistant Staphylococcus aureus (10-05-23 @ 14:50)  Organism: Methicillin resistant Staphylococcus aureus (10-05-23 @ 14:50)      -  Clindamycin: R >4      -  Oxacillin: R >2      -  Gentamicin: S <=1 Should not be used as monotherapy      -  Daptomycin: S 1      -  Linezolid: S 4      -  Cefazolin: R >16      -  Vancomycin: S 2      -  Tetracycline: S 2      Method Type: LEWIS      -  Ampicillin/Sulbactam: R 16/8      -  Penicillin: R >8      -  Rifampin: S <=1 Should not be used as monotherapy      -  Erythromycin: R >4      -  Trimethoprim/Sulfamethoxazole: S <=0.5/9.5    Culture - Blood (collected 09-30-23 @ 19:15)  Source: .Blood Blood-Peripheral  Gram Stain (10-01-23 @ 11:24):    Growth in anaerobic bottle: Gram Positive Cocci in Clusters    Growth in aerobic bottle: Gram Positive Cocci in Clusters  Final Report (10-03-23 @ 07:40):    Growth in aerobic and anaerobic bottles: Methicillin Resistant    Staphylococcus aureus    See previous culture  [id number]    Culture - Blood (collected 09-30-23 @ 19:10)  Source: .Blood Blood-Peripheral  Gram Stain (10-01-23 @ 11:02):    Growth in anaerobic bottle: Gram Positive Cocci in Clusters    Growth in aerobic bottle: Gram Positive Cocci in Clusters  Final Report (10-03-23 @ 07:39):    Growth in aerobic and anaerobic bottles: Methicillin Resistant    Staphylococcus aureus    Direct identification is available within approximately 3-5    hours either by Blood Panel Multiplexed PCR or Direct    MALDI-TOF. Details: [email or web address]  Organism: Blood Culture PCR  Methicillin resistant Staphylococcus aureus (10-03-23 @ 07:39)  Organism: Methicillin resistant Staphylococcus aureus (10-03-23 @ 07:39)      -  Clindamycin: R >4      -  Oxacillin: R >2      -  Gentamicin: S <=1 Should not be used as monotherapy      -  Daptomycin: S 1      -  Linezolid: S 4      -  Cefazolin: R >16      -  Vancomycin: S 2      -  Tetracycline: S 2      Method Type: LEWIS      -  Ampicillin/Sulbactam: R 16/8      -  Penicillin: R >8      -  Rifampin: S <=1 Should not be used as monotherapy      -  Erythromycin: R >4      -  Trimethoprim/Sulfamethoxazole: S <=0.5/9.5  Organism: Blood Culture PCR (10-03-23 @ 07:39)      Method Type: PCR      -  Methicillin resistant Staphylococcus aureus (MRSA): Detec      Rapid RVP Result: NotDetec (10-01-23 @ 15:45)       RADIOLOGY  CXR:      CT:    ECHO:      VITALS:  T(C): 36.2 (10-12-23 @ 13:12), Max: 36.4 (10-11-23 @ 20:36)  T(F): 97.2 (10-12-23 @ 13:12), Max: 97.6 (10-11-23 @ 20:36)  HR: 85 (10-12-23 @ 13:54) (85 - 110)  BP: 100/53 (10-12-23 @ 13:54) (91/54 - 117/62)  BP(mean): --  ABP: --  ABP(mean): --  RR: 18 (10-12-23 @ 13:12) (18 - 20)  SpO2: 93% (10-12-23 @ 13:12) (93% - 97%)  CVP(mm Hg): --  CVP(cm H2O): --    Ins and Outs     10-11-23 @ 07:01  -  10-12-23 @ 07:00  --------------------------------------------------------  IN: 100 mL / OUT: 1100 mL / NET: -1000 mL                I&O's Detail    11 Oct 2023 07:01  -  12 Oct 2023 07:00  --------------------------------------------------------  IN:    Oral Fluid: 100 mL  Total IN: 100 mL    OUT:    Other (mL): 1100 mL  Total OUT: 1100 mL    Total NET: -1000 mL          Follow-up Pulmonary Progress Note  Chief Complaint : Pneumonia due to infectious organism      patient seen and examined  comfortable  alert  confused  unable to provide history  appears more alert than monday when last seen and more interactive        Allergies :levofloxacin (Unknown)  alfuzosin (Unknown)  tamsulosin (Unknown)  Myrbetriq (Unknown)      PAST MEDICAL & SURGICAL HISTORY:  Chronic atrial fibrillation    History of BPH    CAD (coronary artery disease)    Coronary stent patent    ESRD on dialysis    History of GI bleed    Mitral valve replaced        Medications:  MEDICATIONS  (STANDING):  apixaban 5 milliGRAM(s) Oral two times a day  aspirin  chewable 81 milliGRAM(s) Oral daily  ceftaroline fosamil IVPB 200 milliGRAM(s) IV Intermittent every 8 hours  ceftaroline fosamil IVPB      chlorhexidine 2% Cloths 1 Application(s) Topical <User Schedule>  DAPTOmycin IVPB 600 milliGRAM(s) IV Intermittent every 48 hours  epoetin val (PROCRIT) Injectable 34807 Unit(s) IV Push <User Schedule>  folic acid 1 milliGRAM(s) Oral daily  hydrocortisone hemorrhoidal Suppository 1 Suppository(s) Rectal two times a day  influenza  Vaccine (HIGH DOSE) 0.7 milliLiter(s) IntraMuscular once  levothyroxine 150 MICROGram(s) Oral daily  mesalamine Suppository 1000 milliGRAM(s) Rectal at bedtime  midodrine 10 milliGRAM(s) Oral every 8 hours  pantoprazole   Suspension 40 milliGRAM(s) Enteral Tube daily  polyethylene glycol 3350 17 Gram(s) Oral daily  senna 2 Tablet(s) Oral at bedtime    MEDICATIONS  (PRN):  acetaminophen     Tablet .. 650 milliGRAM(s) Oral every 6 hours PRN Mild Pain (1 - 3)  aluminum hydroxide/magnesium hydroxide/simethicone Suspension 10 milliLiter(s) Oral every 6 hours PRN dyspepsia  sodium chloride 0.9% lock flush 10 milliLiter(s) IV Push every 1 hour PRN Pre/post blood products, medications, blood draw, and to maintain line patency      Antibiotics History  caspofungin IVPB    , 10-01-23 @ 08:49  caspofungin IVPB 70 milliGRAM(s) IV Intermittent once, 10-01-23 @ 08:48  caspofungin IVPB 50 milliGRAM(s) IV Intermittent every 24 hours, 10-02-23 @ 08:48  cefepime   IVPB 1000 milliGRAM(s) IV Intermittent once, 09-30-23 @ 19:12, Stop order after: 1 Doses  ceftaroline fosamil IVPB 200 milliGRAM(s) IV Intermittent every 12 hours, 10-08-23 @ 18:00  ceftaroline fosamil IVPB    , 10-08-23 @ 10:32  ceftaroline fosamil IVPB 200 milliGRAM(s) IV Intermittent once, 10-09-23 @ 09:43  ceftaroline fosamil IVPB    , 10-09-23 @ 15:43  ceftaroline fosamil IVPB 200 milliGRAM(s) IV Intermittent once, 10-08-23 @ 08:35  ceftaroline fosamil IVPB 200 milliGRAM(s) IV Intermittent every 8 hours, 10-09-23 @ 14:00  DAPTOmycin IVPB 600 milliGRAM(s) IV Intermittent every 48 hours, 10-10-23 @ 15:02, Stop order after: 42 Days  DAPTOmycin IVPB 720 milliGRAM(s) IV Intermittent every 48 hours, 10-09-23 @ 14:10, Stop order after: 42 Days  meropenem  IVPB    , 10-01-23 @ 09:03  meropenem  IVPB 500 milliGRAM(s) IV Intermittent every 24 hours, 10-02-23 @ 09:02, Stop order after: 8 Days  meropenem  IVPB 500 milliGRAM(s) IV Intermittent once, 10-01-23 @ 09:02, Stop order after: 1 Doses  piperacillin/tazobactam IVPB.. 3.375 Gram(s) IV Intermittent every 12 hours, 09-30-23 @ 22:40, Stop order after: 7 Days  vancomycin  IVPB    , 10-01-23 @ 09:00  vancomycin  IVPB 750 milliGRAM(s) IV Intermittent once, 10-07-23 @ 08:15, Stop order after: 1 Doses  vancomycin  IVPB 1000 milliGRAM(s) IV Intermittent every 24 hours, 10-03-23 @ 00:00  vancomycin  IVPB 1000 milliGRAM(s) IV Intermittent once, 10-01-23 @ 09:00, Stop order after: 1 Doses  vancomycin  IVPB 1000 milliGRAM(s) IV Intermittent every 24 hours, 10-02-23 @ 09:00, Stop order after: 1 Doses  vancomycin  IVPB 1000 milliGRAM(s) IV Intermittent once, 10-04-23 @ 08:29, Stop order after: 1 Doses  vancomycin  IVPB 750 milliGRAM(s) IV Intermittent <User Schedule>, 10-09-23 @ 00:00, Stop order after: 42 Days  vancomycin  IVPB. 1000 milliGRAM(s) IV Intermittent once, 09-30-23 @ 19:35, Stop order after: 1 Doses      Heme Medications   apixaban 5 milliGRAM(s) Oral two times a day, 10-10-23 @ 16:22  aspirin  chewable 81 milliGRAM(s) Oral daily, 10-01-23 @ 12:50      GI Medications  aluminum hydroxide/magnesium hydroxide/simethicone Suspension 10 milliLiter(s) Oral every 6 hours, 10-01-23 @ 13:00, Routine PRN  mesalamine Suppository 1000 milliGRAM(s) Rectal at bedtime, 10-01-23 @ 12:49, Routine  pantoprazole   Suspension 40 milliGRAM(s) Enteral Tube daily, 10-06-23 @ 09:28, Routine  polyethylene glycol 3350 17 Gram(s) Oral daily, 10-01-23 @ 12:36, Routine  senna 2 Tablet(s) Oral at bedtime, 10-01-23 @ 12:36, Routine        LABS:                        7.4    16.12 )-----------( 140      ( 12 Oct 2023 07:13 )             22.1     10-12    136  |  100  |  42<H>  ----------------------------<  102<H>  3.8   |  28  |  3.42<H>    Ca    9.1      12 Oct 2023 07:13  Phos  3.8     10-12  Mg     1.7     10-12    TPro  5.8<L>  /  Alb  1.9<L>  /  TBili  0.9  /  DBili  x   /  AST  16  /  ALT  11  /  AlkPhos  203<H>  10-12           CULTURES: (if applicable)    Culture - Blood (collected 10-11-23 @ 05:52)  Source: .Blood Blood-Peripheral  Gram Stain (10-12-23 @ 09:49):    Growth in aerobic bottle: Gram Positive Cocci in Clusters    Growth in anaerobic bottle: Gram Positive Cocci in Clusters  Preliminary Report (10-12-23 @ 09:50):    Growth in aerobic bottle: Gram Positive Cocci in Clusters    Growth in anaerobic bottle: Gram Positive Cocci in Clusters    Culture - Blood (collected 10-11-23 @ 05:26)  Source: .Blood Blood-Peripheral  Gram Stain (10-12-23 @ 12:13):    Growth in aerobic bottle: Gram Positive Cocci in Clusters  Preliminary Report (10-12-23 @ 12:13):    Growth in aerobic bottle: Gram Positive Cocci in Clusters    Culture - Blood (collected 10-09-23 @ 07:00)  Source: .Blood Blood  Gram Stain (10-10-23 @ 06:14):    Growth in aerobic bottle: Gram Positive Cocci in Clusters    Growth in anaerobic bottle: Gram Positive Cocci in Clusters  Final Report (10-11-23 @ 12:08):    Growth in aerobic and anaerobic bottles: Methicillin Resistant    Staphylococcus aureus    See previous culture [id number]    Culture - Blood (collected 10-09-23 @ 07:00)  Source: .Blood Blood  Gram Stain (10-10-23 @ 09:30):    Growth in aerobic bottle: Gram Positive Cocci in Clusters    Growth in anaerobic bottle: Gram Positive Cocci in Clusters  Final Report (10-11-23 @ 14:50):    Growth in aerobic and anaerobic bottles: Methicillin Resistant    Staphylococcus aureus  Organism: Methicillin resistant Staphylococcus aureus (10-11-23 @ 14:50)  Organism: Methicillin resistant Staphylococcus aureus (10-11-23 @ 14:50)      -  Clindamycin: R >4      -  Oxacillin: R >2      -  Gentamicin: S <=1 Should not be used as monotherapy      -  Daptomycin: S 1      -  Linezolid: S 2      -  Cefazolin: R >16      -  Vancomycin: S 2      -  Tetracycline: S <=1      Method Type: LEWIS      -  Ampicillin/Sulbactam: R <=8/4      -  Penicillin: R >8      -  Rifampin: S <=1 Should not be used as monotherapy      -  Erythromycin: R >4      -  Trimethoprim/Sulfamethoxazole: S <=0.5/9.5    Culture - Blood (collected 10-06-23 @ 10:20)  Source: .Blood Blood-Peripheral  Gram Stain (10-07-23 @ 04:46):    Growth in aerobic bottle: Gram Positive Cocci in Clusters    Growth in anaerobic bottle: Gram Positive Cocci in Clusters  Final Report (10-08-23 @ 08:18):    Growth in aerobic and anaerobic bottles: Methicillin Resistant    Staphylococcus aureus    See previous culture [id number]    Culture - Blood (collected 10-04-23 @ 06:00)  Source: .Blood Blood  Gram Stain (10-05-23 @ 08:18):    Growth in aerobic bottle: Gram Positive Cocci in Clusters    Growth in anaerobic bottle: Gram Positive Cocci in Clusters  Final Report (10-06-23 @ 14:47):    Growth in aerobic and anaerobic bottles: Methicillin Resistant    Staphylococcus aureus  Organism: Methicillin resistant Staphylococcus aureus (10-06-23 @ 14:47)  Organism: Methicillin resistant Staphylococcus aureus (10-06-23 @ 14:47)      -  Clindamycin: R >4      -  Oxacillin: R >2      -  Gentamicin: S <=1 Should not be used as monotherapy      -  Daptomycin: S 1      -  Linezolid: S 2      -  Cefazolin: R >16      -  Vancomycin: S 2      -  Tetracycline: S 2      Method Type: LEWIS      -  Ampicillin/Sulbactam: R <=8/4      -  Penicillin: R 8      -  Rifampin: S <=1 Should not be used as monotherapy      -  Erythromycin: R >4      -  Trimethoprim/Sulfamethoxazole: S <=0.5/9.5    Culture - Blood (collected 10-04-23 @ 06:00)  Source: .Blood Blood  Gram Stain (10-05-23 @ 07:40):    Growth in aerobic bottle: Gram Positive Cocci in Clusters    Growth in anaerobic bottle: Gram Positive Cocci in Clusters  Final Report (10-06-23 @ 14:49):    Growth in aerobic and anaerobic bottles: Methicillin Resistant    Staphylococcus aureus    See previous culture [id number]    Culture - Catheter (collected 10-02-23 @ 16:21)  Source: .Catheter + MRSA Bacterimia Permacath Removal Aerobic/Anaerobic Right  IJ Permacath TIP  Final Report (10-05-23 @ 14:50):    Greater than or equal to 15 Colonies Methicillin Resistant Staphylococcus    aureus  Organism: Methicillin resistant Staphylococcus aureus (10-05-23 @ 14:50)  Organism: Methicillin resistant Staphylococcus aureus (10-05-23 @ 14:50)      -  Clindamycin: R >4      -  Oxacillin: R >2      -  Gentamicin: S <=1 Should not be used as monotherapy      -  Daptomycin: S 1      -  Linezolid: S 4      -  Cefazolin: R >16      -  Vancomycin: S 2      -  Tetracycline: S 2      Method Type: LEWIS      -  Ampicillin/Sulbactam: R 16/8      -  Penicillin: R >8      -  Rifampin: S <=1 Should not be used as monotherapy      -  Erythromycin: R >4      -  Trimethoprim/Sulfamethoxazole: S <=0.5/9.5    Culture - Blood (collected 09-30-23 @ 19:15)  Source: .Blood Blood-Peripheral  Gram Stain (10-01-23 @ 11:24):    Growth in anaerobic bottle: Gram Positive Cocci in Clusters    Growth in aerobic bottle: Gram Positive Cocci in Clusters  Final Report (10-03-23 @ 07:40):    Growth in aerobic and anaerobic bottles: Methicillin Resistant    Staphylococcus aureus    See previous culture  [id number]    Culture - Blood (collected 09-30-23 @ 19:10)  Source: .Blood Blood-Peripheral  Gram Stain (10-01-23 @ 11:02):    Growth in anaerobic bottle: Gram Positive Cocci in Clusters    Growth in aerobic bottle: Gram Positive Cocci in Clusters  Final Report (10-03-23 @ 07:39):    Growth in aerobic and anaerobic bottles: Methicillin Resistant    Staphylococcus aureus    Direct identification is available within approximately 3-5    hours either by Blood Panel Multiplexed PCR or Direct    MALDI-TOF. Details: [email or web address]  Organism: Blood Culture PCR  Methicillin resistant Staphylococcus aureus (10-03-23 @ 07:39)  Organism: Methicillin resistant Staphylococcus aureus (10-03-23 @ 07:39)      -  Clindamycin: R >4      -  Oxacillin: R >2      -  Gentamicin: S <=1 Should not be used as monotherapy      -  Daptomycin: S 1      -  Linezolid: S 4      -  Cefazolin: R >16      -  Vancomycin: S 2      -  Tetracycline: S 2      Method Type: LEWIS      -  Ampicillin/Sulbactam: R 16/8      -  Penicillin: R >8      -  Rifampin: S <=1 Should not be used as monotherapy      -  Erythromycin: R >4      -  Trimethoprim/Sulfamethoxazole: S <=0.5/9.5  Organism: Blood Culture PCR (10-03-23 @ 07:39)      Method Type: PCR      -  Methicillin resistant Staphylococcus aureus (MRSA): Detec      Rapid RVP Result: NotDetec (10-01-23 @ 15:45)       RADIOLOGY  CXR:      CT:    ECHO:      VITALS:  T(C): 36.2 (10-12-23 @ 13:12), Max: 36.4 (10-11-23 @ 20:36)  T(F): 97.2 (10-12-23 @ 13:12), Max: 97.6 (10-11-23 @ 20:36)  HR: 85 (10-12-23 @ 13:54) (85 - 110)  BP: 100/53 (10-12-23 @ 13:54) (91/54 - 117/62)  BP(mean): --  ABP: --  ABP(mean): --  RR: 18 (10-12-23 @ 13:12) (18 - 20)  SpO2: 93% (10-12-23 @ 13:12) (93% - 97%)  CVP(mm Hg): --  CVP(cm H2O): --    Ins and Outs     10-11-23 @ 07:01  -  10-12-23 @ 07:00  --------------------------------------------------------  IN: 100 mL / OUT: 1100 mL / NET: -1000 mL                I&O's Detail    11 Oct 2023 07:01  -  12 Oct 2023 07:00  --------------------------------------------------------  IN:    Oral Fluid: 100 mL  Total IN: 100 mL    OUT:    Other (mL): 1100 mL  Total OUT: 1100 mL    Total NET: -1000 mL

## 2023-10-12 NOTE — DIETITIAN NUTRITION RISK NOTIFICATION - TREATMENT: THE FOLLOWING DIET HAS BEEN RECOMMENDED
Diet, Pureed:   DASH/TLC {Sodium & Cholesterol Restricted}  For patients receiving Renal Replacement - No Protein Restr, No Conc K, No Conc Phos, Low Sodium (10-08-23 @ 13:45) [Active]      
Diet, Renal Restrictions:   For patients receiving Renal Replacement - No Protein Restr, No Conc K, No Conc Phos, Low Sodium  Supplement Feeding Modality:  Oral  Nepro Cans or Servings Per Day:  1       Frequency:  Two Times a day (10-01-23 @ 09:04) [Pending Verification By Attending]  Diet, Renal Restrictions:   For patients receiving Renal Replacement - No Protein Restr, No Conc K, No Conc Phos, Low Sodium (09-30-23 @ 23:51) [Active]

## 2023-10-12 NOTE — PROGRESS NOTE ADULT - ASSESSMENT
78y male with a PMH of A Fib, ESRD via HDC, CAD, Mitral valve replacement, and LAAA clip placement & micra PPM.    Admitted on 9/30 in evening with fever and hypotension.  He required pressors and was covered broadly with vanco/meropenem and caspofungin, found have MRSA in the blood.  He had a tunnelled catheter, he is encephalopathic, unclear how long he has been on dialysis.   High grade MRSA bacteremia with septic shock.   He has a tunnelled HDC and a prosthetic mitral valve.  He was treated for MSSA bacteremia in March of 2023, negative ERENDIRA for valvular lesions, PPM removed and leadless micra PPM placed.  He was hospitalized in August with a rectal bleed,, CT showed  "proctitis" and received 10 days of ertapenem for a clostridial bacteremia.  His permacath was removed on 10/2, culture grew MRSA with hopes of catheter removal to act as a form of source control.     However, despite removal of catheter, bacteremia remained high grade & sustained, Ceftaroline was added to IV vanco.  Then on 10/09 - vancomycin was switched over to Dapto & Ceftaroline continued. Blood cx repeated on 10/11/23 are now with high grade GPC bacteremia again.   Agree with Dr Green that he likely has lack of source control despite removal of his permacath giving ongoing mrsa bacteremia because the mvr is likely infected. CT cap unrevealing for alternative protected focus.  Goc discussions are ongoing - he is now DNR/ DNI    PLAN:  He is on a very broad spectrum antibiotics & inability to control bacteremia here is a very poor prognostic indicator. This may be his terminal infection. Duration of antibiotics cannot be determined unless bacteremia is controlled. And even after that   Repeat blood cx every 48 hrs to evaluate if bacteremia is / can be controlled.     DOS - 10/12/23

## 2023-10-12 NOTE — PROGRESS NOTE ADULT - PROBLEM SELECTOR PLAN 1
Thrombocytopenia–improving.  Persistent aerobic bottle gram-positive cocci in clusters reported this a.m.  Continues on daptomycin and ceftaroline per ID.  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  Will remain under expectant hematologic surveillance.  No need for transfusion of platelets, no evidence of active bleeding.

## 2023-10-12 NOTE — PROGRESS NOTE ADULT - ASSESSMENT
Physical Examination:  GENERAL:               Awake, No acute distress.    HEENT:                   No JVD, Moist MM, RIJ HD catheter  PULM:                     Bilateral air entry, No Rhonchi, No Wheezing  CVS:                         S1, S2,  + Murmur  ABD:                        Soft, nondistended, nontender, normoactive bowel sounds,   EXT:                         + bilateral upper extremity edema, nontender, No Cyanosis or Clubbing   Vascular:                 Warm Extremities, Normal Capillary refill, Normal Distal Pulses  NEURO:                  Awake, Arousable, follows simple commands  PSYC:                      Calm, Limited Insight.        Assessment  - Septic shock, source suspected cardiac valves   - MRSA bacteremia   - AMS- metabolic encephelopathy  - Thrombocytopenia, suspect from Sepsis  - Underlying h/o CAD s/p PCI, BPH, ESRD on HD, s/p MVR, Afib    Plan:  - pt mental status slowly improving  - on abnx as per id, but bacteremia continues, may likely have endocarditis. suspect patient poor candidate for valve replacement.   - would discuss with family and team for goals of treatment. need perm cath when infection clears but unsure time frame that will be required.  - consider CT surg consultation. consider ERENDIRA confirmation  - Cont. midodrine for now, taper as tolerated but patient on it chronically pre HD    - no acute pulmonary/critical care issues will follow up as needed

## 2023-10-12 NOTE — PROGRESS NOTE ADULT - ASSESSMENT
Patient is a 78M h/o ESRD, Afib on Eliquis, CAD s/p PCI, prosthetic s/p MVR, h/o proctitis, hemorrhoids, hypothyroidism was in ICU for metabolic encephalopathy, septic shock 2/2 MRSA bacteremia s/p tunnel cath removal 10/3, IJ Shiley placed 10/4, downgraded to 3E for further management.    #Septic shock 2/2 MRSA bacteremia  #Metabolic encephalopathy  - awake, AOx0-1  - CTH 10/4 was negative for acute intracranial pathology  - plan to wean off midodrine as tolerated  - maintain O2 sat >94%  - speech and swallow eval: puree with thin liquids  - Aspiration precaution     #MRSA bacteremia  - permacath removed 10/3, shiley placed 10/4  - TTE 10/1/23 EF 45-50%, no vegetations  - c/w ceftaroline q8  - switched vancomycin to daptomycin by ID, recs appreciated  - BCx 10/6 +MRSA, BCx 10/9 gm positive clusters  - BCx 10/11 gm positive clusters  - concern for endocarditis, but not a candidate for valve replacements  - CT chest, abd/pelvis 10/11: b/l pleural effusions with lower lobe atelectatic changes, parenchymal infiltrate/consolidation, interlobular septal thickening. Renal calculi, horshoe kidney, suspected bladder wall thickening.  - ID: can hold on ERENDIRA as TTE showed probable vegetations on mvr  - Patient continues to be bacteremic and prognosis is poor.    #ESRD  - HD M/W/F  - Shikeren placed in ICU 10/4  - Nephro consult appreciated  - daily weights    Anemia of ESRD/AOCD  -H/H stable  -FOBT negative  -Epo per nephrology   -Monitor H/H    Thrombocytopenia (Improved)  -Heme following  -Likely secondary to sepsis  -Monitor counts    #Afib  - CHADsVASc 5  - rate controlled  - h/o CAD s/p PCI, s/p MVR  - c/w Eliquis 5mg bid  - c/w ASA    #Hypothyroidism  - TSH elevated  - synthroid dose increased to 150mcg  - F/U outpatient to titrate as needed    #h/o hemorrhoids, proctitis  - c/w hydrocortisone suppository, mesalamine  - c/w bowel regimen    #DVT ppx  - Eliquis 5mg bid    #GI ppx  - c/w pantoprazole 40mg qd    Full code. Palliative on board. Patient is a 78M h/o ESRD, Afib on Eliquis, CAD s/p PCI, prosthetic s/p MVR, h/o proctitis, hemorrhoids, hypothyroidism was in ICU for metabolic encephalopathy, septic shock 2/2 MRSA bacteremia s/p tunnel cath removal 10/3, IJ Shiley placed 10/4, downgraded to 3E for further management.    #Septic shock 2/2 MRSA bacteremia  #Metabolic encephalopathy  - awake, AOx0-1  - CTH 10/4 was negative for acute intracranial pathology  - plan to wean off midodrine as tolerated  - maintain O2 sat >94%  - speech and swallow eval: puree with thin liquids  - Aspiration precaution     #MRSA bacteremia  - permacath removed 10/3, shiley placed 10/4  - TTE 10/1/23 EF 45-50%, no vegetations  - WBCs downtrending  - c/w ceftaroline q8  - switched vancomycin to daptomycin by ID, recs appreciated  - BCx 10/6 +MRSA, BCx 10/9 gm positive clusters  - BCx 10/11 gm positive clusters  - concern for endocarditis, but not a candidate for valve replacements  - CT chest, abd/pelvis 10/11: b/l pleural effusions with lower lobe atelectatic changes, parenchymal infiltrate/consolidation, interlobular septal thickening. Renal calculi, horshoe kidney, suspected bladder wall thickening.  - ID: can hold on ERENDIRA as TTE showed probable vegetations on mvr  - Patient continues to be bacteremic despite antibiotic coverage; prognosis is poor.    #ESRD  - HD M/W/F  - Sofía placed in ICU 10/4  - Nephro consult appreciated  - daily weights    Anemia of ESRD/AOCD  -H/H stable  -FOBT negative  -Epo per nephrology   -Monitor H/H    Thrombocytopenia (Improved)  -Heme following  -Likely secondary to sepsis  -Monitor counts    #Afib  - CHADsVASc 5  - rate controlled  - h/o CAD s/p PCI, s/p MVR  - c/w Eliquis 5mg bid  - c/w ASA    #Hypothyroidism  - TSH elevated  - synthroid dose increased to 150mcg  - F/U outpatient to titrate as needed    #h/o hemorrhoids, proctitis  - c/w hydrocortisone suppository, mesalamine  - c/w bowel regimen    #DVT ppx  - Eliquis 5mg bid    #GI ppx  - c/w pantoprazole 40mg qd    Full code. Palliative on board.

## 2023-10-12 NOTE — PROVIDER CONTACT NOTE (CRITICAL VALUE NOTIFICATION) - PERSON GIVING RESULT:
[facility], radha dodd [facility], radha dodd [facility], radha dodd

## 2023-10-12 NOTE — PROGRESS NOTE ADULT - ASSESSMENT
A/P 77 yo M pmhx ESRD came to ED complaining of fatigue from GG JOEL . Patient  also febrile to 102.2 in ED with complains of cough and wheezing.  Noted to be hypotensive to 80's systolic. Course in ICU , now  downgraded to 3E.   Persistent MRSA bacteremia despite antibiotics and continues w/ AMS       Septic shock 2/2 MRSA bacteremia  Metabolic encephalopathy  - awake, AOx0-1  - CTH 10/4 was negative for acute intracranial pathology  - speech and swallow eval: puree with thin liquids  - Aspiration precaution       MRSA bacteremia  - perma cath removed 10/3, shiley placed 10/4  - TTE 10/1/23 EF 45-50%, no vegetations-Repeat TTE 10/11-  shows probable veg on mvr,  bc pending  - ID following  -             - continue antibiotics with daptomycin and ceftaroline.            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     ESRD  - HD M/W/F;   - Nephro follows -  appreciated    Palliative ;   as a follow up , chart reviewed,  appreciate Cards and ID following. I saw pt bedside this AM, still mostly  drowsy, does wake w/ stim., keeping eyes open longer , but still very  weakened , looks ill,  speaks few words , and mainly confused as to his medical situation.    However not in any distress, just mainly lethargic.  Girlfriencordelia Irby has been updated by med team and cardiology , and she has been in contact w. pts  outpt cardiologist as well. She is aware pt not a candidate for ERENDIRA or surgery.    ID input will be important to determine length of antibiotic treatments.    A/P 79 yo M pmhx ESRD came to ED complaining of fatigue from GG JOEL . Patient  also febrile to 102.2 in ED with complains of cough and wheezing.  Noted to be hypotensive to 80's systolic. Course in ICU , now  downgraded to 3E.   Persistent MRSA bacteremia despite antibiotics and continues w/ AMS       Septic shock 2/2 MRSA bacteremia  Metabolic encephalopathy  - awake, AOx0-1  - CTH 10/4 was negative for acute intracranial pathology  - speech and swallow eval: puree with thin liquids  - Aspiration precaution       MRSA bacteremia  - perma cath removed 10/3, shiley placed 10/4  - TTE 10/1/23 EF 45-50%, no vegetations-Repeat TTE 10/11-  shows probable veg on mvr,  bc pending  - ID following  -             - continue antibiotics with daptomycin and ceftaroline.            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     ESRD  - HD M/W/F;   - Nephro follows -  appreciated    Palliative ;   as a follow up , chart reviewed,  appreciate Cards and ID following. I saw pt bedside this AM, still mostly  drowsy, does wake w/ stim., keeping eyes open longer , but still very  weakened , looks ill,  speaks few words , and mainly confused as to his medical situation.    However not in any distress, just mainly lethargic.  Girlfriencordelia Irby has been updated by med team and cardiology , and she has been in contact w. pts  outpt cardiologist as well. She is aware pt not a candidate for ERENDIRA or surgery.    ID input will be important to determine length of antibiotic treatments.   See GOC note above - pt now Dnr.Dni , no feeding tubes Molst completed today .   will  cont to follow  w/ med team.

## 2023-10-12 NOTE — PROGRESS NOTE ADULT - SUBJECTIVE AND OBJECTIVE BOX
Patient is a 78y old  Male who presents with a chief complaint of Sepsis (12 Oct 2023 11:29)      INTERVAL HPI/OVERNIGHT EVENTS: Patient seen and examined at bedside. No overnight events.    MEDICATIONS  (STANDING):  apixaban 5 milliGRAM(s) Oral two times a day  aspirin  chewable 81 milliGRAM(s) Oral daily  ceftaroline fosamil IVPB 200 milliGRAM(s) IV Intermittent every 8 hours  ceftaroline fosamil IVPB      chlorhexidine 2% Cloths 1 Application(s) Topical <User Schedule>  DAPTOmycin IVPB 600 milliGRAM(s) IV Intermittent every 48 hours  epoetin val (PROCRIT) Injectable 42953 Unit(s) IV Push <User Schedule>  folic acid 1 milliGRAM(s) Oral daily  hydrocortisone hemorrhoidal Suppository 1 Suppository(s) Rectal two times a day  influenza  Vaccine (HIGH DOSE) 0.7 milliLiter(s) IntraMuscular once  levothyroxine 150 MICROGram(s) Oral daily  mesalamine Suppository 1000 milliGRAM(s) Rectal at bedtime  midodrine 10 milliGRAM(s) Oral every 8 hours  pantoprazole   Suspension 40 milliGRAM(s) Enteral Tube daily  polyethylene glycol 3350 17 Gram(s) Oral daily  senna 2 Tablet(s) Oral at bedtime    MEDICATIONS  (PRN):  acetaminophen     Tablet .. 650 milliGRAM(s) Oral every 6 hours PRN Mild Pain (1 - 3)  aluminum hydroxide/magnesium hydroxide/simethicone Suspension 10 milliLiter(s) Oral every 6 hours PRN dyspepsia  sodium chloride 0.9% lock flush 10 milliLiter(s) IV Push every 1 hour PRN Pre/post blood products, medications, blood draw, and to maintain line patency      Allergies    levofloxacin (Unknown)  alfuzosin (Unknown)  tamsulosin (Unknown)  Myrbetriq (Unknown)    Intolerances      Vital Signs Last 24 Hrs  T(C): 36.3 (12 Oct 2023 04:58), Max: 36.4 (11 Oct 2023 17:55)  T(F): 97.3 (12 Oct 2023 04:58), Max: 97.6 (11 Oct 2023 17:55)  HR: 110 (12 Oct 2023 04:58) (76 - 110)  BP: 91/54 (12 Oct 2023 04:58) (86/45 - 119/55)  BP(mean): --  RR: 19 (12 Oct 2023 04:58) (19 - 20)  SpO2: 93% (12 Oct 2023 04:58) (93% - 97%)    Parameters below as of 12 Oct 2023 04:58  Patient On (Oxygen Delivery Method): room air      I&O's Summary    11 Oct 2023 07:01  -  12 Oct 2023 07:00  --------------------------------------------------------  IN: 100 mL / OUT: 1100 mL / NET: -1000 mL      PHYSICAL EXAM:  GENERAL: NAD  HEENT:  AT/NC, moist mucous membranes  CHEST/LUNG:  CTA b/l, no rales, wheezes, or rhonchi,  normal respiratory effort, no intercostal retractions  HEART:  irregular HR, S1, S2  VASCULAR: rt IJ shiley in place  ABDOMEN:  BS+, soft, nontender, nondistended  MSK/EXTREMITIES: 2+ peripheral pulses, b/l upper ext edema, nontender, edema rt foot  NERVOUS SYSTEM: awake, AOx1      LABS: Personally reviewed  CBC                        7.4    16.12 )-----------( 140      ( 12 Oct 2023 07:13 )             22.1     CMP  10-12    136  |  100  |  42  ----------------------------<  102  3.8   |  28  |  3.42    Ca    9.1      12 Oct 2023 07:13  Phos  3.8     10-12  Mg     1.7     10-12    TPro  5.8  /  Alb  1.9  /  TBili  0.9  /  DBili  x   /  AST  16  /  ALT  11  /  AlkPhos  203  10-12                CARDIAC MARKERS ( 11 Oct 2023 05:26 )  x     / x     / <7 U/L / x     / x                            Urinalysis Basic - ( 12 Oct 2023 07:13 )    Color: x / Appearance: x / SG: x / pH: x  Gluc: 102 mg/dL / Ketone: x  / Bili: x / Urobili: x   Blood: x / Protein: x / Nitrite: x   Leuk Esterase: x / RBC: x / WBC x   Sq Epi: x / Non Sq Epi: x / Bacteria: x        Culture - Blood (collected 11 Oct 2023 05:52)  Source: .Blood Blood-Peripheral  Gram Stain (12 Oct 2023 09:49):    Growth in aerobic bottle: Gram Positive Cocci in Clusters    Growth in anaerobic bottle: Gram Positive Cocci in Clusters  Preliminary Report (12 Oct 2023 09:50):    Growth in aerobic bottle: Gram Positive Cocci in Clusters    Growth in anaerobic bottle: Gram Positive Cocci in Clusters    Culture - Blood (collected 11 Oct 2023 05:26)  Source: .Blood Blood-Peripheral  Gram Stain (12 Oct 2023 12:13):    Growth in aerobic bottle: Gram Positive Cocci in Clusters  Preliminary Report (12 Oct 2023 12:13):    Growth in aerobic bottle: Gram Positive Cocci in Clusters    Culture - Blood (collected 09 Oct 2023 07:00)  Source: .Blood Blood  Gram Stain (10 Oct 2023 06:14):    Growth in aerobic bottle: Gram Positive Cocci in Clusters    Growth in anaerobic bottle: Gram Positive Cocci in Clusters  Final Report (11 Oct 2023 12:08):    Growth in aerobic and anaerobic bottles: Methicillin Resistant    Staphylococcus aureus    See previous culture [id number]    Culture - Blood (collected 09 Oct 2023 07:00)  Source: .Blood Blood  Gram Stain (10 Oct 2023 09:30):    Growth in aerobic bottle: Gram Positive Cocci in Clusters    Growth in anaerobic bottle: Gram Positive Cocci in Clusters  Final Report (11 Oct 2023 14:50):    Growth in aerobic and anaerobic bottles: Methicillin Resistant    Staphylococcus aureus  Organism: Methicillin resistant Staphylococcus aureus (11 Oct 2023 14:50)  Organism: Methicillin resistant Staphylococcus aureus (11 Oct 2023 14:50)      -  Clindamycin: R >4      -  Oxacillin: R >2      -  Gentamicin: S <=1 Should not be used as monotherapy      -  Daptomycin: S 1      -  Linezolid: S 2      -  Cefazolin: R >16      -  Vancomycin: S 2      -  Tetracycline: S <=1      Method Type: LEWIS      -  Ampicillin/Sulbactam: R <=8/4      -  Penicillin: R >8      -  Rifampin: S <=1 Should not be used as monotherapy      -  Erythromycin: R >4      -  Trimethoprim/Sulfamethoxazole: S <=0.5/9.5    Culture - Blood (collected 06 Oct 2023 10:20)  Source: .Blood Blood-Peripheral  Gram Stain (07 Oct 2023 04:46):    Growth in aerobic bottle: Gram Positive Cocci in Clusters    Growth in anaerobic bottle: Gram Positive Cocci in Clusters  Final Report (08 Oct 2023 08:18):    Growth in aerobic and anaerobic bottles: Methicillin Resistant    Staphylococcus aureus    See previous culture [id number]            Culture - Blood (collected 10-11-23 @ 05:52)  Source: .Blood Blood-Peripheral  Gram Stain (10-12-23 @ 09:49):    Growth in aerobic bottle: Gram Positive Cocci in Clusters    Growth in anaerobic bottle: Gram Positive Cocci in Clusters  Preliminary Report (10-12-23 @ 09:50):    Growth in aerobic bottle: Gram Positive Cocci in Clusters    Growth in anaerobic bottle: Gram Positive Cocci in Clusters    Culture - Blood (collected 10-11-23 @ 05:26)  Source: .Blood Blood-Peripheral  Gram Stain (10-12-23 @ 12:13):    Growth in aerobic bottle: Gram Positive Cocci in Clusters  Preliminary Report (10-12-23 @ 12:13):    Growth in aerobic bottle: Gram Positive Cocci in Clusters    Culture - Blood (collected 10-09-23 @ 07:00)  Source: .Blood Blood  Gram Stain (10-10-23 @ 06:14):    Growth in aerobic bottle: Gram Positive Cocci in Clusters    Growth in anaerobic bottle: Gram Positive Cocci in Clusters  Final Report (10-11-23 @ 12:08):    Growth in aerobic and anaerobic bottles: Methicillin Resistant    Staphylococcus aureus    See previous culture [id number]    Culture - Blood (collected 10-09-23 @ 07:00)  Source: .Blood Blood  Gram Stain (10-10-23 @ 09:30):    Growth in aerobic bottle: Gram Positive Cocci in Clusters    Growth in anaerobic bottle: Gram Positive Cocci in Clusters  Final Report (10-11-23 @ 14:50):    Growth in aerobic and anaerobic bottles: Methicillin Resistant    Staphylococcus aureus  Organism: Methicillin resistant Staphylococcus aureus (10-11-23 @ 14:50)  Organism: Methicillin resistant Staphylococcus aureus (10-11-23 @ 14:50)      -  Clindamycin: R >4      -  Oxacillin: R >2      -  Gentamicin: S <=1 Should not be used as monotherapy      -  Daptomycin: S 1      -  Linezolid: S 2      -  Cefazolin: R >16      -  Vancomycin: S 2      -  Tetracycline: S <=1      Method Type: LEWIS      -  Ampicillin/Sulbactam: R <=8/4      -  Penicillin: R >8      -  Rifampin: S <=1 Should not be used as monotherapy      -  Erythromycin: R >4      -  Trimethoprim/Sulfamethoxazole: S <=0.5/9.5    Culture - Blood (collected 10-06-23 @ 10:20)  Source: .Blood Blood-Peripheral  Gram Stain (10-07-23 @ 04:46):    Growth in aerobic bottle: Gram Positive Cocci in Clusters    Growth in anaerobic bottle: Gram Positive Cocci in Clusters  Final Report (10-08-23 @ 08:18):    Growth in aerobic and anaerobic bottles: Methicillin Resistant    Staphylococcus aureus    See previous culture [id number]        RADIOLOGY & ADDITIONAL TESTS: Personally reviewed.     Consultant(s) Notes Reviewed:  [x] YES  [ ] NO   Discussed with TRACEY/PEPPER, RN     Patient is a 78y old  Male who presents with a chief complaint of Sepsis (12 Oct 2023 11:29)      INTERVAL HPI/OVERNIGHT EVENTS: Patient seen and examined at bedside. No overnight events.    MEDICATIONS  (STANDING):  apixaban 5 milliGRAM(s) Oral two times a day  aspirin  chewable 81 milliGRAM(s) Oral daily  ceftaroline fosamil IVPB 200 milliGRAM(s) IV Intermittent every 8 hours  ceftaroline fosamil IVPB      chlorhexidine 2% Cloths 1 Application(s) Topical <User Schedule>  DAPTOmycin IVPB 600 milliGRAM(s) IV Intermittent every 48 hours  epoetin val (PROCRIT) Injectable 51587 Unit(s) IV Push <User Schedule>  folic acid 1 milliGRAM(s) Oral daily  hydrocortisone hemorrhoidal Suppository 1 Suppository(s) Rectal two times a day  influenza  Vaccine (HIGH DOSE) 0.7 milliLiter(s) IntraMuscular once  levothyroxine 150 MICROGram(s) Oral daily  mesalamine Suppository 1000 milliGRAM(s) Rectal at bedtime  midodrine 10 milliGRAM(s) Oral every 8 hours  pantoprazole   Suspension 40 milliGRAM(s) Enteral Tube daily  polyethylene glycol 3350 17 Gram(s) Oral daily  senna 2 Tablet(s) Oral at bedtime    MEDICATIONS  (PRN):  acetaminophen     Tablet .. 650 milliGRAM(s) Oral every 6 hours PRN Mild Pain (1 - 3)  aluminum hydroxide/magnesium hydroxide/simethicone Suspension 10 milliLiter(s) Oral every 6 hours PRN dyspepsia  sodium chloride 0.9% lock flush 10 milliLiter(s) IV Push every 1 hour PRN Pre/post blood products, medications, blood draw, and to maintain line patency      Allergies    levofloxacin (Unknown)  alfuzosin (Unknown)  tamsulosin (Unknown)  Myrbetriq (Unknown)    Intolerances      Vital Signs Last 24 Hrs  T(C): 36.3 (12 Oct 2023 04:58), Max: 36.4 (11 Oct 2023 17:55)  T(F): 97.3 (12 Oct 2023 04:58), Max: 97.6 (11 Oct 2023 17:55)  HR: 110 (12 Oct 2023 04:58) (76 - 110)  BP: 91/54 (12 Oct 2023 04:58) (86/45 - 119/55)  BP(mean): --  RR: 19 (12 Oct 2023 04:58) (19 - 20)  SpO2: 93% (12 Oct 2023 04:58) (93% - 97%)    Parameters below as of 12 Oct 2023 04:58  Patient On (Oxygen Delivery Method): room air      I&O's Summary    11 Oct 2023 07:01  -  12 Oct 2023 07:00  --------------------------------------------------------  IN: 100 mL / OUT: 1100 mL / NET: -1000 mL      PHYSICAL EXAM:  GENERAL: NAD  HEENT:  AT/NC, moist mucous membranes  CHEST/LUNG:  CTA b/l, no rales, wheezes, or rhonchi,  normal respiratory effort, no intercostal retractions  HEART:  irregular HR, S1, S2  VASCULAR: rt IJ shiley in place  ABDOMEN:  BS+, soft, nontender, nondistended  MSK/EXTREMITIES: 2+ peripheral pulses, b/l upper ext edema, nontender, edema rt foot  NERVOUS SYSTEM: awake, AOx1      LABS: Personally reviewed  CBC                        7.4    16.12 )-----------( 140      ( 12 Oct 2023 07:13 )             22.1     CMP  10-12    136  |  100  |  42  ----------------------------<  102  3.8   |  28  |  3.42    Ca    9.1      12 Oct 2023 07:13  Phos  3.8     10-12  Mg     1.7     10-12    TPro  5.8  /  Alb  1.9  /  TBili  0.9  /  DBili  x   /  AST  16  /  ALT  11  /  AlkPhos  203  10-12                CARDIAC MARKERS ( 11 Oct 2023 05:26 )  x     / x     / <7 U/L / x     / x                            Urinalysis Basic - ( 12 Oct 2023 07:13 )    Color: x / Appearance: x / SG: x / pH: x  Gluc: 102 mg/dL / Ketone: x  / Bili: x / Urobili: x   Blood: x / Protein: x / Nitrite: x   Leuk Esterase: x / RBC: x / WBC x   Sq Epi: x / Non Sq Epi: x / Bacteria: x        Culture - Blood (collected 11 Oct 2023 05:52)  Source: .Blood Blood-Peripheral  Gram Stain (12 Oct 2023 09:49):    Growth in aerobic bottle: Gram Positive Cocci in Clusters    Growth in anaerobic bottle: Gram Positive Cocci in Clusters  Preliminary Report (12 Oct 2023 09:50):    Growth in aerobic bottle: Gram Positive Cocci in Clusters    Growth in anaerobic bottle: Gram Positive Cocci in Clusters    Culture - Blood (collected 11 Oct 2023 05:26)  Source: .Blood Blood-Peripheral  Gram Stain (12 Oct 2023 12:13):    Growth in aerobic bottle: Gram Positive Cocci in Clusters  Preliminary Report (12 Oct 2023 12:13):    Growth in aerobic bottle: Gram Positive Cocci in Clusters    Culture - Blood (collected 09 Oct 2023 07:00)  Source: .Blood Blood  Gram Stain (10 Oct 2023 06:14):    Growth in aerobic bottle: Gram Positive Cocci in Clusters    Growth in anaerobic bottle: Gram Positive Cocci in Clusters  Final Report (11 Oct 2023 12:08):    Growth in aerobic and anaerobic bottles: Methicillin Resistant    Staphylococcus aureus    See previous culture [id number]    Culture - Blood (collected 09 Oct 2023 07:00)  Source: .Blood Blood  Gram Stain (10 Oct 2023 09:30):    Growth in aerobic bottle: Gram Positive Cocci in Clusters    Growth in anaerobic bottle: Gram Positive Cocci in Clusters  Final Report (11 Oct 2023 14:50):    Growth in aerobic and anaerobic bottles: Methicillin Resistant    Staphylococcus aureus  Organism: Methicillin resistant Staphylococcus aureus (11 Oct 2023 14:50)  Organism: Methicillin resistant Staphylococcus aureus (11 Oct 2023 14:50)      -  Clindamycin: R >4      -  Oxacillin: R >2      -  Gentamicin: S <=1 Should not be used as monotherapy      -  Daptomycin: S 1      -  Linezolid: S 2      -  Cefazolin: R >16      -  Vancomycin: S 2      -  Tetracycline: S <=1      Method Type: LEWIS      -  Ampicillin/Sulbactam: R <=8/4      -  Penicillin: R >8      -  Rifampin: S <=1 Should not be used as monotherapy      -  Erythromycin: R >4      -  Trimethoprim/Sulfamethoxazole: S <=0.5/9.5    Culture - Blood (collected 06 Oct 2023 10:20)  Source: .Blood Blood-Peripheral  Gram Stain (07 Oct 2023 04:46):    Growth in aerobic bottle: Gram Positive Cocci in Clusters    Growth in anaerobic bottle: Gram Positive Cocci in Clusters  Final Report (08 Oct 2023 08:18):    Growth in aerobic and anaerobic bottles: Methicillin Resistant    Staphylococcus aureus    See previous culture [id number]            Culture - Blood (collected 10-11-23 @ 05:52)  Source: .Blood Blood-Peripheral  Gram Stain (10-12-23 @ 09:49):    Growth in aerobic bottle: Gram Positive Cocci in Clusters    Growth in anaerobic bottle: Gram Positive Cocci in Clusters  Preliminary Report (10-12-23 @ 09:50):    Growth in aerobic bottle: Gram Positive Cocci in Clusters    Growth in anaerobic bottle: Gram Positive Cocci in Clusters    Culture - Blood (collected 10-11-23 @ 05:26)  Source: .Blood Blood-Peripheral  Gram Stain (10-12-23 @ 12:13):    Growth in aerobic bottle: Gram Positive Cocci in Clusters  Preliminary Report (10-12-23 @ 12:13):    Growth in aerobic bottle: Gram Positive Cocci in Clusters    Culture - Blood (collected 10-09-23 @ 07:00)  Source: .Blood Blood  Gram Stain (10-10-23 @ 06:14):    Growth in aerobic bottle: Gram Positive Cocci in Clusters    Growth in anaerobic bottle: Gram Positive Cocci in Clusters  Final Report (10-11-23 @ 12:08):    Growth in aerobic and anaerobic bottles: Methicillin Resistant    Staphylococcus aureus    See previous culture [id number]    Culture - Blood (collected 10-09-23 @ 07:00)  Source: .Blood Blood  Gram Stain (10-10-23 @ 09:30):    Growth in aerobic bottle: Gram Positive Cocci in Clusters    Growth in anaerobic bottle: Gram Positive Cocci in Clusters  Final Report (10-11-23 @ 14:50):    Growth in aerobic and anaerobic bottles: Methicillin Resistant    Staphylococcus aureus  Organism: Methicillin resistant Staphylococcus aureus (10-11-23 @ 14:50)  Organism: Methicillin resistant Staphylococcus aureus (10-11-23 @ 14:50)      -  Clindamycin: R >4      -  Oxacillin: R >2      -  Gentamicin: S <=1 Should not be used as monotherapy      -  Daptomycin: S 1      -  Linezolid: S 2      -  Cefazolin: R >16      -  Vancomycin: S 2      -  Tetracycline: S <=1      Method Type: LEWIS      -  Ampicillin/Sulbactam: R <=8/4      -  Penicillin: R >8      -  Rifampin: S <=1 Should not be used as monotherapy      -  Erythromycin: R >4      -  Trimethoprim/Sulfamethoxazole: S <=0.5/9.5    Culture - Blood (collected 10-06-23 @ 10:20)  Source: .Blood Blood-Peripheral  Gram Stain (10-07-23 @ 04:46):    Growth in aerobic bottle: Gram Positive Cocci in Clusters    Growth in anaerobic bottle: Gram Positive Cocci in Clusters  Final Report (10-08-23 @ 08:18):    Growth in aerobic and anaerobic bottles: Methicillin Resistant    Staphylococcus aureus    See previous culture [id number]        RADIOLOGY & ADDITIONAL TESTS: Personally reviewed.     Consultant(s) Notes Reviewed:  [x] YES  [ ] NO   Discussed with TRACEY/PEPPER, RN     Patient is a 78y old  Male who presents with a chief complaint of Sepsis (12 Oct 2023 11:29)      INTERVAL HPI/OVERNIGHT EVENTS: Patient seen and examined at bedside. No overnight events.    MEDICATIONS  (STANDING):  apixaban 5 milliGRAM(s) Oral two times a day  aspirin  chewable 81 milliGRAM(s) Oral daily  ceftaroline fosamil IVPB 200 milliGRAM(s) IV Intermittent every 8 hours  ceftaroline fosamil IVPB      chlorhexidine 2% Cloths 1 Application(s) Topical <User Schedule>  DAPTOmycin IVPB 600 milliGRAM(s) IV Intermittent every 48 hours  epoetin val (PROCRIT) Injectable 89101 Unit(s) IV Push <User Schedule>  folic acid 1 milliGRAM(s) Oral daily  hydrocortisone hemorrhoidal Suppository 1 Suppository(s) Rectal two times a day  influenza  Vaccine (HIGH DOSE) 0.7 milliLiter(s) IntraMuscular once  levothyroxine 150 MICROGram(s) Oral daily  mesalamine Suppository 1000 milliGRAM(s) Rectal at bedtime  midodrine 10 milliGRAM(s) Oral every 8 hours  pantoprazole   Suspension 40 milliGRAM(s) Enteral Tube daily  polyethylene glycol 3350 17 Gram(s) Oral daily  senna 2 Tablet(s) Oral at bedtime    MEDICATIONS  (PRN):  acetaminophen     Tablet .. 650 milliGRAM(s) Oral every 6 hours PRN Mild Pain (1 - 3)  aluminum hydroxide/magnesium hydroxide/simethicone Suspension 10 milliLiter(s) Oral every 6 hours PRN dyspepsia  sodium chloride 0.9% lock flush 10 milliLiter(s) IV Push every 1 hour PRN Pre/post blood products, medications, blood draw, and to maintain line patency      Allergies    levofloxacin (Unknown)  alfuzosin (Unknown)  tamsulosin (Unknown)  Myrbetriq (Unknown)    Intolerances      Vital Signs Last 24 Hrs  T(C): 36.3 (12 Oct 2023 04:58), Max: 36.4 (11 Oct 2023 17:55)  T(F): 97.3 (12 Oct 2023 04:58), Max: 97.6 (11 Oct 2023 17:55)  HR: 110 (12 Oct 2023 04:58) (76 - 110)  BP: 91/54 (12 Oct 2023 04:58) (86/45 - 119/55)  BP(mean): --  RR: 19 (12 Oct 2023 04:58) (19 - 20)  SpO2: 93% (12 Oct 2023 04:58) (93% - 97%)    Parameters below as of 12 Oct 2023 04:58  Patient On (Oxygen Delivery Method): room air      I&O's Summary    11 Oct 2023 07:01  -  12 Oct 2023 07:00  --------------------------------------------------------  IN: 100 mL / OUT: 1100 mL / NET: -1000 mL      PHYSICAL EXAM:  GENERAL: NAD  HEENT:  AT/NC, moist mucous membranes  CHEST/LUNG:  CTA b/l, no rales, wheezes, or rhonchi,  normal respiratory effort, no intercostal retractions  HEART:  irregular HR, S1, S2  VASCULAR: rt IJ shiley in place  ABDOMEN:  BS+, soft, nontender, nondistended  MSK/EXTREMITIES: 2+ peripheral pulses, b/l upper ext edema, nontender, edema rt foot  NERVOUS SYSTEM: awake, AOx1      LABS: Personally reviewed  CBC                        7.4    16.12 )-----------( 140      ( 12 Oct 2023 07:13 )             22.1     CMP  10-12    136  |  100  |  42  ----------------------------<  102  3.8   |  28  |  3.42    Ca    9.1      12 Oct 2023 07:13  Phos  3.8     10-12  Mg     1.7     10-12    TPro  5.8  /  Alb  1.9  /  TBili  0.9  /  DBili  x   /  AST  16  /  ALT  11  /  AlkPhos  203  10-12                CARDIAC MARKERS ( 11 Oct 2023 05:26 )  x     / x     / <7 U/L / x     / x                            Urinalysis Basic - ( 12 Oct 2023 07:13 )    Color: x / Appearance: x / SG: x / pH: x  Gluc: 102 mg/dL / Ketone: x  / Bili: x / Urobili: x   Blood: x / Protein: x / Nitrite: x   Leuk Esterase: x / RBC: x / WBC x   Sq Epi: x / Non Sq Epi: x / Bacteria: x        Culture - Blood (collected 11 Oct 2023 05:52)  Source: .Blood Blood-Peripheral  Gram Stain (12 Oct 2023 09:49):    Growth in aerobic bottle: Gram Positive Cocci in Clusters    Growth in anaerobic bottle: Gram Positive Cocci in Clusters  Preliminary Report (12 Oct 2023 09:50):    Growth in aerobic bottle: Gram Positive Cocci in Clusters    Growth in anaerobic bottle: Gram Positive Cocci in Clusters    Culture - Blood (collected 11 Oct 2023 05:26)  Source: .Blood Blood-Peripheral  Gram Stain (12 Oct 2023 12:13):    Growth in aerobic bottle: Gram Positive Cocci in Clusters  Preliminary Report (12 Oct 2023 12:13):    Growth in aerobic bottle: Gram Positive Cocci in Clusters    Culture - Blood (collected 09 Oct 2023 07:00)  Source: .Blood Blood  Gram Stain (10 Oct 2023 06:14):    Growth in aerobic bottle: Gram Positive Cocci in Clusters    Growth in anaerobic bottle: Gram Positive Cocci in Clusters  Final Report (11 Oct 2023 12:08):    Growth in aerobic and anaerobic bottles: Methicillin Resistant    Staphylococcus aureus    See previous culture [id number]    Culture - Blood (collected 09 Oct 2023 07:00)  Source: .Blood Blood  Gram Stain (10 Oct 2023 09:30):    Growth in aerobic bottle: Gram Positive Cocci in Clusters    Growth in anaerobic bottle: Gram Positive Cocci in Clusters  Final Report (11 Oct 2023 14:50):    Growth in aerobic and anaerobic bottles: Methicillin Resistant    Staphylococcus aureus  Organism: Methicillin resistant Staphylococcus aureus (11 Oct 2023 14:50)  Organism: Methicillin resistant Staphylococcus aureus (11 Oct 2023 14:50)      -  Clindamycin: R >4      -  Oxacillin: R >2      -  Gentamicin: S <=1 Should not be used as monotherapy      -  Daptomycin: S 1      -  Linezolid: S 2      -  Cefazolin: R >16      -  Vancomycin: S 2      -  Tetracycline: S <=1      Method Type: LEWIS      -  Ampicillin/Sulbactam: R <=8/4      -  Penicillin: R >8      -  Rifampin: S <=1 Should not be used as monotherapy      -  Erythromycin: R >4      -  Trimethoprim/Sulfamethoxazole: S <=0.5/9.5    Culture - Blood (collected 06 Oct 2023 10:20)  Source: .Blood Blood-Peripheral  Gram Stain (07 Oct 2023 04:46):    Growth in aerobic bottle: Gram Positive Cocci in Clusters    Growth in anaerobic bottle: Gram Positive Cocci in Clusters  Final Report (08 Oct 2023 08:18):    Growth in aerobic and anaerobic bottles: Methicillin Resistant    Staphylococcus aureus    See previous culture [id number]            Culture - Blood (collected 10-11-23 @ 05:52)  Source: .Blood Blood-Peripheral  Gram Stain (10-12-23 @ 09:49):    Growth in aerobic bottle: Gram Positive Cocci in Clusters    Growth in anaerobic bottle: Gram Positive Cocci in Clusters  Preliminary Report (10-12-23 @ 09:50):    Growth in aerobic bottle: Gram Positive Cocci in Clusters    Growth in anaerobic bottle: Gram Positive Cocci in Clusters    Culture - Blood (collected 10-11-23 @ 05:26)  Source: .Blood Blood-Peripheral  Gram Stain (10-12-23 @ 12:13):    Growth in aerobic bottle: Gram Positive Cocci in Clusters  Preliminary Report (10-12-23 @ 12:13):    Growth in aerobic bottle: Gram Positive Cocci in Clusters    Culture - Blood (collected 10-09-23 @ 07:00)  Source: .Blood Blood  Gram Stain (10-10-23 @ 06:14):    Growth in aerobic bottle: Gram Positive Cocci in Clusters    Growth in anaerobic bottle: Gram Positive Cocci in Clusters  Final Report (10-11-23 @ 12:08):    Growth in aerobic and anaerobic bottles: Methicillin Resistant    Staphylococcus aureus    See previous culture [id number]    Culture - Blood (collected 10-09-23 @ 07:00)  Source: .Blood Blood  Gram Stain (10-10-23 @ 09:30):    Growth in aerobic bottle: Gram Positive Cocci in Clusters    Growth in anaerobic bottle: Gram Positive Cocci in Clusters  Final Report (10-11-23 @ 14:50):    Growth in aerobic and anaerobic bottles: Methicillin Resistant    Staphylococcus aureus  Organism: Methicillin resistant Staphylococcus aureus (10-11-23 @ 14:50)  Organism: Methicillin resistant Staphylococcus aureus (10-11-23 @ 14:50)      -  Clindamycin: R >4      -  Oxacillin: R >2      -  Gentamicin: S <=1 Should not be used as monotherapy      -  Daptomycin: S 1      -  Linezolid: S 2      -  Cefazolin: R >16      -  Vancomycin: S 2      -  Tetracycline: S <=1      Method Type: LEWIS      -  Ampicillin/Sulbactam: R <=8/4      -  Penicillin: R >8      -  Rifampin: S <=1 Should not be used as monotherapy      -  Erythromycin: R >4      -  Trimethoprim/Sulfamethoxazole: S <=0.5/9.5    Culture - Blood (collected 10-06-23 @ 10:20)  Source: .Blood Blood-Peripheral  Gram Stain (10-07-23 @ 04:46):    Growth in aerobic bottle: Gram Positive Cocci in Clusters    Growth in anaerobic bottle: Gram Positive Cocci in Clusters  Final Report (10-08-23 @ 08:18):    Growth in aerobic and anaerobic bottles: Methicillin Resistant    Staphylococcus aureus    See previous culture [id number]        RADIOLOGY & ADDITIONAL TESTS: Personally reviewed.     Consultant(s) Notes Reviewed:  [x] YES  [ ] NO   Discussed with TRACEY/PEPPER, RN

## 2023-10-12 NOTE — PROGRESS NOTE ADULT - NUTRITIONAL ASSESSMENT
This patient has been assessed with a concern for Malnutrition and has been determined to have a diagnosis/diagnoses of Severe protein-calorie malnutrition.    This patient is being managed with:   Diet Pureed-  DASH/TLC {Sodium & Cholesterol Restricted}  For patients receiving Renal Replacement - No Protein Restr No Conc K No Conc Phos Low Sodium  Supplement Feeding Modality:  Oral  Nepro Cans or Servings Per Day:  1       Frequency:  Two Times a day  Entered: Oct 12 2023  1:04PM    Diet Pureed-  DASH/TLC {Sodium & Cholesterol Restricted}  For patients receiving Renal Replacement - No Protein Restr No Conc K No Conc Phos Low Sodium  Entered: Oct  8 2023  1:44PM    The following pending diet order is being considered for treatment of Severe protein-calorie malnutrition:null

## 2023-10-12 NOTE — PROGRESS NOTE ADULT - ASSESSMENT
imp    patient with presumed pve    currently afebrile wbc unchanged    hemodynamically stable    no ERENDIRA planned    will sign off for now    please contact us if further imput required

## 2023-10-13 LAB
ALBUMIN SERPL ELPH-MCNC: 1.9 G/DL — LOW (ref 3.3–5)
ALP SERPL-CCNC: 183 U/L — HIGH (ref 40–120)
ALT FLD-CCNC: 7 U/L — LOW (ref 10–45)
ANION GAP SERPL CALC-SCNC: 8 MMOL/L — SIGNIFICANT CHANGE UP (ref 5–17)
AST SERPL-CCNC: 15 U/L — SIGNIFICANT CHANGE UP (ref 10–40)
BASOPHILS # BLD AUTO: 0.09 K/UL — SIGNIFICANT CHANGE UP (ref 0–0.2)
BASOPHILS NFR BLD AUTO: 0.6 % — SIGNIFICANT CHANGE UP (ref 0–2)
BILIRUB SERPL-MCNC: 0.8 MG/DL — SIGNIFICANT CHANGE UP (ref 0.2–1.2)
BUN SERPL-MCNC: 48 MG/DL — HIGH (ref 7–23)
CALCIUM SERPL-MCNC: 8.9 MG/DL — SIGNIFICANT CHANGE UP (ref 8.4–10.5)
CHLORIDE SERPL-SCNC: 100 MMOL/L — SIGNIFICANT CHANGE UP (ref 96–108)
CO2 SERPL-SCNC: 27 MMOL/L — SIGNIFICANT CHANGE UP (ref 22–31)
CREAT SERPL-MCNC: 4.1 MG/DL — HIGH (ref 0.5–1.3)
CULTURE RESULTS: SIGNIFICANT CHANGE UP
EGFR: 14 ML/MIN/1.73M2 — LOW
EOSINOPHIL # BLD AUTO: 0.12 K/UL — SIGNIFICANT CHANGE UP (ref 0–0.5)
EOSINOPHIL NFR BLD AUTO: 0.8 % — SIGNIFICANT CHANGE UP (ref 0–6)
GLUCOSE SERPL-MCNC: 90 MG/DL — SIGNIFICANT CHANGE UP (ref 70–99)
GRAM STN FLD: SIGNIFICANT CHANGE UP
HCT VFR BLD CALC: 22.1 % — LOW (ref 39–50)
HGB BLD-MCNC: 7.3 G/DL — LOW (ref 13–17)
IMM GRANULOCYTES NFR BLD AUTO: 1.2 % — HIGH (ref 0–0.9)
LYMPHOCYTES # BLD AUTO: 1.35 K/UL — SIGNIFICANT CHANGE UP (ref 1–3.3)
LYMPHOCYTES # BLD AUTO: 8.5 % — LOW (ref 13–44)
MCHC RBC-ENTMCNC: 30.7 PG — SIGNIFICANT CHANGE UP (ref 27–34)
MCHC RBC-ENTMCNC: 33 GM/DL — SIGNIFICANT CHANGE UP (ref 32–36)
MCV RBC AUTO: 92.9 FL — SIGNIFICANT CHANGE UP (ref 80–100)
MONOCYTES # BLD AUTO: 1.29 K/UL — HIGH (ref 0–0.9)
MONOCYTES NFR BLD AUTO: 8.1 % — SIGNIFICANT CHANGE UP (ref 2–14)
NEUTROPHILS # BLD AUTO: 12.89 K/UL — HIGH (ref 1.8–7.4)
NEUTROPHILS NFR BLD AUTO: 80.8 % — HIGH (ref 43–77)
NRBC # BLD: 0 /100 WBCS — SIGNIFICANT CHANGE UP (ref 0–0)
PLATELET # BLD AUTO: 154 K/UL — SIGNIFICANT CHANGE UP (ref 150–400)
POTASSIUM SERPL-MCNC: 4.2 MMOL/L — SIGNIFICANT CHANGE UP (ref 3.5–5.3)
POTASSIUM SERPL-SCNC: 4.2 MMOL/L — SIGNIFICANT CHANGE UP (ref 3.5–5.3)
PROT SERPL-MCNC: 6.3 G/DL — SIGNIFICANT CHANGE UP (ref 6–8.3)
RBC # BLD: 2.38 M/UL — LOW (ref 4.2–5.8)
RBC # FLD: 16.6 % — HIGH (ref 10.3–14.5)
SODIUM SERPL-SCNC: 135 MMOL/L — SIGNIFICANT CHANGE UP (ref 135–145)
SPECIMEN SOURCE: SIGNIFICANT CHANGE UP
WBC # BLD: 15.93 K/UL — HIGH (ref 3.8–10.5)
WBC # FLD AUTO: 15.93 K/UL — HIGH (ref 3.8–10.5)

## 2023-10-13 PROCEDURE — 99232 SBSQ HOSP IP/OBS MODERATE 35: CPT

## 2023-10-13 PROCEDURE — 99497 ADVNCD CARE PLAN 30 MIN: CPT

## 2023-10-13 RX ADMIN — Medication 1000 MILLIGRAM(S): at 21:27

## 2023-10-13 RX ADMIN — CEFTAROLINE FOSAMIL 50 MILLIGRAM(S): 600 POWDER, FOR SOLUTION INTRAVENOUS at 21:26

## 2023-10-13 RX ADMIN — Medication 150 MICROGRAM(S): at 07:30

## 2023-10-13 RX ADMIN — Medication 1 SUPPOSITORY(S): at 07:29

## 2023-10-13 RX ADMIN — APIXABAN 5 MILLIGRAM(S): 2.5 TABLET, FILM COATED ORAL at 18:58

## 2023-10-13 RX ADMIN — MIDODRINE HYDROCHLORIDE 10 MILLIGRAM(S): 2.5 TABLET ORAL at 14:21

## 2023-10-13 RX ADMIN — MIDODRINE HYDROCHLORIDE 10 MILLIGRAM(S): 2.5 TABLET ORAL at 07:31

## 2023-10-13 RX ADMIN — SENNA PLUS 2 TABLET(S): 8.6 TABLET ORAL at 21:27

## 2023-10-13 RX ADMIN — PANTOPRAZOLE SODIUM 40 MILLIGRAM(S): 20 TABLET, DELAYED RELEASE ORAL at 13:38

## 2023-10-13 RX ADMIN — CEFTAROLINE FOSAMIL 50 MILLIGRAM(S): 600 POWDER, FOR SOLUTION INTRAVENOUS at 07:30

## 2023-10-13 RX ADMIN — Medication 1 MILLIGRAM(S): at 14:23

## 2023-10-13 RX ADMIN — CEFTAROLINE FOSAMIL 50 MILLIGRAM(S): 600 POWDER, FOR SOLUTION INTRAVENOUS at 15:00

## 2023-10-13 RX ADMIN — ERYTHROPOIETIN 10000 UNIT(S): 10000 INJECTION, SOLUTION INTRAVENOUS; SUBCUTANEOUS at 10:22

## 2023-10-13 RX ADMIN — Medication 81 MILLIGRAM(S): at 13:37

## 2023-10-13 RX ADMIN — APIXABAN 5 MILLIGRAM(S): 2.5 TABLET, FILM COATED ORAL at 07:30

## 2023-10-13 RX ADMIN — CHLORHEXIDINE GLUCONATE 1 APPLICATION(S): 213 SOLUTION TOPICAL at 07:28

## 2023-10-13 NOTE — PROVIDER CONTACT NOTE (CRITICAL VALUE NOTIFICATION) - ASSESSMENT
Pt. A&Ox1-2, VSS, no signs of distress or discomfort.
Patient is currently receiving treatment for active sepsis, result expected.
fatigue, weakness, lethargic
asymptomatic

## 2023-10-13 NOTE — PROGRESS NOTE ADULT - ATTENDING COMMENTS
Patient seen and examined at bedside. No acute overnight events.    Vitals reviewed  Physical exam remarkable for Right sided dialysis catheter, edematous extremitites    Pt w/ persistent MRSA Bacteremia, w/ repeat blood cultures positive  High concern for Endocarditis, however, performing EREDNIRA will not . Not a candidate for valvular surgery  Pt not tolerating HD sessions as becomes hypotensive  Discussed case w/ Pt's outpatient cardiologist, Dr. Lainez, as well as Palliative team  Dr. Lainez in agreement with [facility] team that pt w/ poor prognosis and should consider stopping HD as pt is not tolerating it.   Continue Abx for now, however, will need to discuss w/ pt's partner regarding comfort measures    DNR/DNI as of now Patient seen and examined at bedside. No acute overnight events.    Vitals reviewed  Physical exam remarkable for Right sided dialysis catheter, edematous extremitites    Pt w/ persistent MRSA Bacteremia, w/ repeat blood cultures positive  High concern for Endocarditis, however, performing ERENDIRA will not . Not a candidate for valvular surgery  Pt not tolerating HD sessions as becomes hypotensive  Discussed case w/ Pt's outpatient cardiologist, Dr. Lainez, as well as Palliative team  [facility] w/ poor prognosis and should consider stopping HD as pt is not tolerating it.   Continue Abx for now, however, will need to discuss w/ pt's partner regarding comfort measures    DNR/DNI as of now Patient seen and examined at bedside. No acute overnight events.    Vitals reviewed  Physical exam remarkable for Right sided dialysis catheter, edematous extremitites    Pt w/ persistent MRSA Bacteremia, w/ repeat blood cultures positive  High concern for Endocarditis, however, performing ERENDIRA will not . Not a candidate for valvular surgery  Pt not tolerating HD sessions as becomes hypotensive  Discussed case w/ Pt's outpatient cardiologist, Dr. Lainez, as well as Palliative team  Dr. Lainez in agreement with [facility] team that pt w/ poor prognosis and should consider stopping HD as pt is not tolerating it.   Continue Abx for now, however, will need to discuss w/ pt's partner regarding comfort measures    DNR/DNI as of now

## 2023-10-13 NOTE — PROVIDER CONTACT NOTE (CRITICAL VALUE NOTIFICATION) - PERSON GIVING RESULT:
[facility]- [facility]ash [facility]- [facility]- [facility]ash

## 2023-10-13 NOTE — PROGRESS NOTE ADULT - SUBJECTIVE AND OBJECTIVE BOX
Progress:  no acute changes, wakes w/ stim., verbal but confused to place, situation     Present Symptoms:   Dyspnea: no  Nausea/Vomiting:   Anxiety:    Depressed Mood:   Fatigue:   Loss of appetite:   Pain:                   location:   Review of Systems: [ Unable to obtain due to poor mentation]    MEDICATIONS  (STANDING):  apixaban 5 milliGRAM(s) Oral two times a day  aspirin  chewable 81 milliGRAM(s) Oral daily  ceftaroline fosamil IVPB 200 milliGRAM(s) IV Intermittent every 8 hours  ceftaroline fosamil IVPB      chlorhexidine 2% Cloths 1 Application(s) Topical <User Schedule>  DAPTOmycin IVPB 600 milliGRAM(s) IV Intermittent every 48 hours  epoetin val (PROCRIT) Injectable 83224 Unit(s) IV Push <User Schedule>  folic acid 1 milliGRAM(s) Oral daily  hydrocortisone hemorrhoidal Suppository 1 Suppository(s) Rectal two times a day  influenza  Vaccine (HIGH DOSE) 0.7 milliLiter(s) IntraMuscular once  levothyroxine 150 MICROGram(s) Oral daily  mesalamine Suppository 1000 milliGRAM(s) Rectal at bedtime  midodrine 10 milliGRAM(s) Oral every 8 hours  pantoprazole   Suspension 40 milliGRAM(s) Enteral Tube daily  polyethylene glycol 3350 17 Gram(s) Oral daily  senna 2 Tablet(s) Oral at bedtime    MEDICATIONS  (PRN):  acetaminophen     Tablet .. 650 milliGRAM(s) Oral every 6 hours PRN Mild Pain (1 - 3)  aluminum hydroxide/magnesium hydroxide/simethicone Suspension 10 milliLiter(s) Oral every 6 hours PRN dyspepsia  sodium chloride 0.9% lock flush 10 milliLiter(s) IV Push every 1 hour PRN Pre/post blood products, medications, blood draw, and to maintain line patency      PHYSICAL EXAM:  Vital Signs Last 24 Hrs  T(C): 37.3 (13 Oct 2023 05:17), Max: 37.3 (12 Oct 2023 20:00)  T(F): 99.1 (13 Oct 2023 05:17), Max: 99.2 (12 Oct 2023 20:00)  HR: 110 (13 Oct 2023 05:17) (76 - 110)  BP: 111/66 (13 Oct 2023 05:17) (96/61 - 111/66)  BP(mean): --  RR: 18 (13 Oct 2023 05:17) (18 - 18)  SpO2: 92% (13 Oct 2023 05:17) (92% - 94%)    Parameters below as of 13 Oct 2023 05:17  Patient On (Oxygen Delivery Method): room air      General: alert w/ stim. confused , not in distress         HEENT: n/c, a/t, dry mouth  , HD cath R neck     Lungs: coarse , resp non labored    CV: normal  -tachy  GI:  abd lrg, soft     : normal    Musculoskeletal: edematous weak x 4    Skin: pale,      Neuro:  deficits, wakes, but confused    Oral intake ability:  oral feeding w/ full assist   Diet: soft, thins      LABS:                          7.3    15.93 )-----------( 154      ( 13 Oct 2023 06:00 )             22.1     10-13    135  |  100  |  48<H>  ----------------------------<  90  4.2   |  27  |  4.10<H>    Ca    8.9      13 Oct 2023 06:00  Phos  3.8     10-12  Mg     1.7     10-12    TPro  6.3  /  Alb  1.9<L>  /  TBili  0.8  /  DBili  x   /  AST  15  /  ALT  7<L>  /  AlkPhos  183<H>  10-13    Urinalysis Basic - ( 13 Oct 2023 06:00 )    Color: x / Appearance: x / SG: x / pH: x  Gluc: 90 mg/dL / Ketone: x  / Bili: x / Urobili: x   Blood: x / Protein: x / Nitrite: x   Leuk Esterase: x / RBC: x / WBC x   Sq Epi: x / Non Sq Epi: x / Bacteria: x        RADIOLOGY & ADDITIONAL STUDIES: < from: CT Abdomen and Pelvis No Cont (10.11.23 @ 10:08) >    IMPRESSION:  1. Bilateral pleural effusions, right greater left. Bilateral lower lobe   atelectatic changes; underlying parenchymal infiltrate/consolidation   cannot be excluded. Interlobular septal thickening.  2. 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   Mild stranding of the perivesical fat, with suspected bladder wall   thickening. Correlate for cystitis.          ADVANCE DIRECTIVES:  Hcp Molst dnr/dni no feeding tubes   Advanced Care Planning discussion total time spent:     Progress:  no acute changes, wakes w/ stim., verbal but confused to place, situation     Present Symptoms:   Dyspnea: no  Nausea/Vomiting:   Anxiety:    Depressed Mood:   Fatigue:   Loss of appetite:   Pain:                   location:   Review of Systems: [ Unable to obtain due to poor mentation]    MEDICATIONS  (STANDING):  apixaban 5 milliGRAM(s) Oral two times a day  aspirin  chewable 81 milliGRAM(s) Oral daily  ceftaroline fosamil IVPB 200 milliGRAM(s) IV Intermittent every 8 hours  ceftaroline fosamil IVPB      chlorhexidine 2% Cloths 1 Application(s) Topical <User Schedule>  DAPTOmycin IVPB 600 milliGRAM(s) IV Intermittent every 48 hours  epoetin val (PROCRIT) Injectable 15342 Unit(s) IV Push <User Schedule>  folic acid 1 milliGRAM(s) Oral daily  hydrocortisone hemorrhoidal Suppository 1 Suppository(s) Rectal two times a day  influenza  Vaccine (HIGH DOSE) 0.7 milliLiter(s) IntraMuscular once  levothyroxine 150 MICROGram(s) Oral daily  mesalamine Suppository 1000 milliGRAM(s) Rectal at bedtime  midodrine 10 milliGRAM(s) Oral every 8 hours  pantoprazole   Suspension 40 milliGRAM(s) Enteral Tube daily  polyethylene glycol 3350 17 Gram(s) Oral daily  senna 2 Tablet(s) Oral at bedtime    MEDICATIONS  (PRN):  acetaminophen     Tablet .. 650 milliGRAM(s) Oral every 6 hours PRN Mild Pain (1 - 3)  aluminum hydroxide/magnesium hydroxide/simethicone Suspension 10 milliLiter(s) Oral every 6 hours PRN dyspepsia  sodium chloride 0.9% lock flush 10 milliLiter(s) IV Push every 1 hour PRN Pre/post blood products, medications, blood draw, and to maintain line patency      PHYSICAL EXAM:  Vital Signs Last 24 Hrs  T(C): 37.3 (13 Oct 2023 05:17), Max: 37.3 (12 Oct 2023 20:00)  T(F): 99.1 (13 Oct 2023 05:17), Max: 99.2 (12 Oct 2023 20:00)  HR: 110 (13 Oct 2023 05:17) (76 - 110)  BP: 111/66 (13 Oct 2023 05:17) (96/61 - 111/66)  BP(mean): --  RR: 18 (13 Oct 2023 05:17) (18 - 18)  SpO2: 92% (13 Oct 2023 05:17) (92% - 94%)    Parameters below as of 13 Oct 2023 05:17  Patient On (Oxygen Delivery Method): room air      General: alert w/ stim. confused , not in distress         HEENT: n/c, a/t, dry mouth  , HD cath R neck     Lungs: coarse , resp non labored    CV: normal  -tachy  GI:  abd lrg, soft     : normal    Musculoskeletal: edematous weak x 4    Skin: pale,      Neuro:  deficits, wakes, but confused    Oral intake ability:  oral feeding w/ full assist   Diet: soft, thins      LABS:                          7.3    15.93 )-----------( 154      ( 13 Oct 2023 06:00 )             22.1     10-13    135  |  100  |  48<H>  ----------------------------<  90  4.2   |  27  |  4.10<H>    Ca    8.9      13 Oct 2023 06:00  Phos  3.8     10-12  Mg     1.7     10-12    TPro  6.3  /  Alb  1.9<L>  /  TBili  0.8  /  DBili  x   /  AST  15  /  ALT  7<L>  /  AlkPhos  183<H>  10-13    Urinalysis Basic - ( 13 Oct 2023 06:00 )    Color: x / Appearance: x / SG: x / pH: x  Gluc: 90 mg/dL / Ketone: x  / Bili: x / Urobili: x   Blood: x / Protein: x / Nitrite: x   Leuk Esterase: x / RBC: x / WBC x   Sq Epi: x / Non Sq Epi: x / Bacteria: x        RADIOLOGY & ADDITIONAL STUDIES: < from: CT Abdomen and Pelvis No Cont (10.11.23 @ 10:08) >    IMPRESSION:  1. Bilateral pleural effusions, right greater left. Bilateral lower lobe   atelectatic changes; underlying parenchymal infiltrate/consolidation   cannot be excluded. Interlobular septal thickening.  2. 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   Mild stranding of the perivesical fat, with suspected bladder wall   thickening. Correlate for cystitis.          ADVANCE DIRECTIVES:  Hcp Molst dnr/dni no feeding tubes   Advanced Care Planning discussion total time spent:     Progress:  no acute changes, wakes w/ stim., verbal but confused to place, situation     Present Symptoms:   Dyspnea: no  Nausea/Vomiting:   Anxiety:    Depressed Mood:   Fatigue:   Loss of appetite:   Pain:                   location:   Review of Systems: [ Unable to obtain due to poor mentation]    MEDICATIONS  (STANDING):  apixaban 5 milliGRAM(s) Oral two times a day  aspirin  chewable 81 milliGRAM(s) Oral daily  ceftaroline fosamil IVPB 200 milliGRAM(s) IV Intermittent every 8 hours  ceftaroline fosamil IVPB      chlorhexidine 2% Cloths 1 Application(s) Topical <User Schedule>  DAPTOmycin IVPB 600 milliGRAM(s) IV Intermittent every 48 hours  epoetin val (PROCRIT) Injectable 45160 Unit(s) IV Push <User Schedule>  folic acid 1 milliGRAM(s) Oral daily  hydrocortisone hemorrhoidal Suppository 1 Suppository(s) Rectal two times a day  influenza  Vaccine (HIGH DOSE) 0.7 milliLiter(s) IntraMuscular once  levothyroxine 150 MICROGram(s) Oral daily  mesalamine Suppository 1000 milliGRAM(s) Rectal at bedtime  midodrine 10 milliGRAM(s) Oral every 8 hours  pantoprazole   Suspension 40 milliGRAM(s) Enteral Tube daily  polyethylene glycol 3350 17 Gram(s) Oral daily  senna 2 Tablet(s) Oral at bedtime    MEDICATIONS  (PRN):  acetaminophen     Tablet .. 650 milliGRAM(s) Oral every 6 hours PRN Mild Pain (1 - 3)  aluminum hydroxide/magnesium hydroxide/simethicone Suspension 10 milliLiter(s) Oral every 6 hours PRN dyspepsia  sodium chloride 0.9% lock flush 10 milliLiter(s) IV Push every 1 hour PRN Pre/post blood products, medications, blood draw, and to maintain line patency      PHYSICAL EXAM:  Vital Signs Last 24 Hrs  T(C): 37.3 (13 Oct 2023 05:17), Max: 37.3 (12 Oct 2023 20:00)  T(F): 99.1 (13 Oct 2023 05:17), Max: 99.2 (12 Oct 2023 20:00)  HR: 110 (13 Oct 2023 05:17) (76 - 110)  BP: 111/66 (13 Oct 2023 05:17) (96/61 - 111/66)  BP(mean): --  RR: 18 (13 Oct 2023 05:17) (18 - 18)  SpO2: 92% (13 Oct 2023 05:17) (92% - 94%)    Parameters below as of 13 Oct 2023 05:17  Patient On (Oxygen Delivery Method): room air      General: alert w/ stim. confused , not in distress         HEENT: n/c, a/t, dry mouth  , HD cath R neck     Lungs: coarse , resp non labored    CV: normal  -tachy  GI:  abd lrg, soft     : normal    Musculoskeletal: edematous weak x 4    Skin: pale,      Neuro:  deficits, wakes, but confused    Oral intake ability:  oral feeding w/ full assist   Diet: soft, thins      LABS:                          7.3    15.93 )-----------( 154      ( 13 Oct 2023 06:00 )             22.1     10-13    135  |  100  |  48<H>  ----------------------------<  90  4.2   |  27  |  4.10<H>    Ca    8.9      13 Oct 2023 06:00  Phos  3.8     10-12  Mg     1.7     10-12    TPro  6.3  /  Alb  1.9<L>  /  TBili  0.8  /  DBili  x   /  AST  15  /  ALT  7<L>  /  AlkPhos  183<H>  10-13    Urinalysis Basic - ( 13 Oct 2023 06:00 )    Color: x / Appearance: x / SG: x / pH: x  Gluc: 90 mg/dL / Ketone: x  / Bili: x / Urobili: x   Blood: x / Protein: x / Nitrite: x   Leuk Esterase: x / RBC: x / WBC x   Sq Epi: x / Non Sq Epi: x / Bacteria: x        RADIOLOGY & ADDITIONAL STUDIES: < from: CT Abdomen and Pelvis No Cont (10.11.23 @ 10:08) >    IMPRESSION:  1. Bilateral pleural effusions, right greater left. Bilateral lower lobe   atelectatic changes; underlying parenchymal infiltrate/consolidation   cannot be excluded. Interlobular septal thickening.  2. 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   Mild stranding of the perivesical fat, with suspected bladder wall   thickening. Correlate for cystitis.          ADVANCE DIRECTIVES:  Hcp Molst dnr/dni no feeding tubes   Advanced Care Planning discussion total time spent:

## 2023-10-13 NOTE — PROGRESS NOTE ADULT - SUBJECTIVE AND OBJECTIVE BOX
Patient is a 78y old  Male who presents with a chief complaint of Sepsis (13 Oct 2023 12:29)      INTERVAL HPI/OVERNIGHT EVENTS: Patient seen and examined at bedside. No overnight events.    MEDICATIONS  (STANDING):  apixaban 5 milliGRAM(s) Oral two times a day  aspirin  chewable 81 milliGRAM(s) Oral daily  ceftaroline fosamil IVPB      ceftaroline fosamil IVPB 200 milliGRAM(s) IV Intermittent every 8 hours  chlorhexidine 2% Cloths 1 Application(s) Topical <User Schedule>  DAPTOmycin IVPB 600 milliGRAM(s) IV Intermittent every 48 hours  epoetin val (PROCRIT) Injectable 62295 Unit(s) IV Push <User Schedule>  folic acid 1 milliGRAM(s) Oral daily  hydrocortisone hemorrhoidal Suppository 1 Suppository(s) Rectal two times a day  influenza  Vaccine (HIGH DOSE) 0.7 milliLiter(s) IntraMuscular once  levothyroxine 150 MICROGram(s) Oral daily  mesalamine Suppository 1000 milliGRAM(s) Rectal at bedtime  midodrine 10 milliGRAM(s) Oral every 8 hours  pantoprazole   Suspension 40 milliGRAM(s) Enteral Tube daily  polyethylene glycol 3350 17 Gram(s) Oral daily  senna 2 Tablet(s) Oral at bedtime    MEDICATIONS  (PRN):  acetaminophen     Tablet .. 650 milliGRAM(s) Oral every 6 hours PRN Mild Pain (1 - 3)  aluminum hydroxide/magnesium hydroxide/simethicone Suspension 10 milliLiter(s) Oral every 6 hours PRN dyspepsia  sodium chloride 0.9% lock flush 10 milliLiter(s) IV Push every 1 hour PRN Pre/post blood products, medications, blood draw, and to maintain line patency      Allergies    levofloxacin (Unknown)  alfuzosin (Unknown)  tamsulosin (Unknown)  Myrbetriq (Unknown)    Intolerances        Vital Signs Last 24 Hrs  T(C): 37.3 (13 Oct 2023 05:17), Max: 37.3 (12 Oct 2023 20:00)  T(F): 99.1 (13 Oct 2023 05:17), Max: 99.2 (12 Oct 2023 20:00)  HR: 110 (13 Oct 2023 05:17) (76 - 110)  BP: 111/66 (13 Oct 2023 05:17) (96/61 - 111/66)  BP(mean): --  RR: 18 (13 Oct 2023 05:17) (18 - 18)  SpO2: 92% (13 Oct 2023 05:17) (92% - 94%)    Parameters below as of 13 Oct 2023 05:17  Patient On (Oxygen Delivery Method): room air      I&O's Summary    12 Oct 2023 07:01  -  13 Oct 2023 07:00  --------------------------------------------------------  IN: 100 mL / OUT: 0 mL / NET: 100 mL        PHYSICAL EXAM:  GENERAL: NAD  HEENT:  AT/NC, moist mucous membranes  CHEST/LUNG:  CTA b/l, no rales, wheezes, or rhonchi,  normal respiratory effort, no intercostal retractions  HEART:  irregular HR, S1, S2  VASCULAR: rt IJ shiley in place  ABDOMEN:  BS+, soft, nontender, nondistended  MSK/EXTREMITIES: 2+ peripheral pulses, b/l upper ext edema, nontender, edema rt foot  NERVOUS SYSTEM: awake, AOx1      LABS: Personally reviewed  CBC                        7.3    15.93 )-----------( 154      ( 13 Oct 2023 06:00 )             22.1     CMP  10-13    135  |  100  |  48  ----------------------------<  90  4.2   |  27  |  4.10    Ca    8.9      13 Oct 2023 06:00  Phos  3.8     10-12  Mg     1.7     10-12    TPro  6.3  /  Alb  1.9  /  TBili  0.8  /  DBili  x   /  AST  15  /  ALT  7   /  AlkPhos  183  10-13                CARDIAC MARKERS ( 11 Oct 2023 05:26 )  x     / x     / <7 U/L / x     / x                            Urinalysis Basic - ( 13 Oct 2023 06:00 )    Color: x / Appearance: x / SG: x / pH: x  Gluc: 90 mg/dL / Ketone: x  / Bili: x / Urobili: x   Blood: x / Protein: x / Nitrite: x   Leuk Esterase: x / RBC: x / WBC x   Sq Epi: x / Non Sq Epi: x / Bacteria: x        Culture - Blood (collected 11 Oct 2023 05:52)  Source: .Blood Blood-Peripheral  Gram Stain (12 Oct 2023 09:49):    Growth in aerobic bottle: Gram Positive Cocci in Clusters    Growth in anaerobic bottle: Gram Positive Cocci in Clusters  Final Report (13 Oct 2023 06:31):    Growth in aerobic and anaerobic bottles: Methicillin Resistant    Staphylococcus aureus    See previous culture [id number]    Culture - Blood (collected 11 Oct 2023 05:26)  Source: .Blood Blood-Peripheral  Gram Stain (13 Oct 2023 03:20):    Growth in aerobic bottle: Gram Positive Cocci in Clusters    Growth in anaerobic bottle: Gram Positive Cocci in Clusters  Preliminary Report (13 Oct 2023 03:20):    Growth in aerobic bottle: Gram Positive Cocci in Clusters    Growth in anaerobic bottle: Gram Positive Cocci in Clusters    Culture - Blood (collected 09 Oct 2023 07:00)  Source: .Blood Blood  Gram Stain (10 Oct 2023 09:30):    Growth in aerobic bottle: Gram Positive Cocci in Clusters    Growth in anaerobic bottle: Gram Positive Cocci in Clusters  Final Report (11 Oct 2023 14:50):    Growth in aerobic and anaerobic bottles: Methicillin Resistant    Staphylococcus aureus  Organism: Methicillin resistant Staphylococcus aureus (11 Oct 2023 14:50)  Organism: Methicillin resistant Staphylococcus aureus (11 Oct 2023 14:50)      -  Clindamycin: R >4      -  Oxacillin: R >2      -  Gentamicin: S <=1 Should not be used as monotherapy      -  Daptomycin: S 1      -  Linezolid: S 2      -  Cefazolin: R >16      -  Vancomycin: S 2      -  Tetracycline: S <=1      Method Type: LEWIS      -  Ampicillin/Sulbactam: R <=8/4      -  Penicillin: R >8      -  Rifampin: S <=1 Should not be used as monotherapy      -  Erythromycin: R >4      -  Trimethoprim/Sulfamethoxazole: S <=0.5/9.5    Culture - Blood (collected 09 Oct 2023 07:00)  Source: .Blood Blood  Gram Stain (10 Oct 2023 06:14):    Growth in aerobic bottle: Gram Positive Cocci in Clusters    Growth in anaerobic bottle: Gram Positive Cocci in Clusters  Final Report (11 Oct 2023 12:08):    Growth in aerobic and anaerobic bottles: Methicillin Resistant    Staphylococcus aureus    See previous culture [id number]            Culture - Blood (collected 10-11-23 @ 05:52)  Source: .Blood Blood-Peripheral  Gram Stain (10-12-23 @ 09:49):    Growth in aerobic bottle: Gram Positive Cocci in Clusters    Growth in anaerobic bottle: Gram Positive Cocci in Clusters  Final Report (10-13-23 @ 06:31):    Growth in aerobic and anaerobic bottles: Methicillin Resistant    Staphylococcus aureus    See previous culture [id number]    Culture - Blood (collected 10-11-23 @ 05:26)  Source: .Blood Blood-Peripheral  Gram Stain (10-13-23 @ 03:20):    Growth in aerobic bottle: Gram Positive Cocci in Clusters    Growth in anaerobic bottle: Gram Positive Cocci in Clusters  Preliminary Report (10-13-23 @ 03:20):    Growth in aerobic bottle: Gram Positive Cocci in Clusters    Growth in anaerobic bottle: Gram Positive Cocci in Clusters    Culture - Blood (collected 10-09-23 @ 07:00)  Source: .Blood Blood  Gram Stain (10-10-23 @ 09:30):    Growth in aerobic bottle: Gram Positive Cocci in Clusters    Growth in anaerobic bottle: Gram Positive Cocci in Clusters  Final Report (10-11-23 @ 14:50):    Growth in aerobic and anaerobic bottles: Methicillin Resistant    Staphylococcus aureus  Organism: Methicillin resistant Staphylococcus aureus (10-11-23 @ 14:50)  Organism: Methicillin resistant Staphylococcus aureus (10-11-23 @ 14:50)      -  Clindamycin: R >4      -  Oxacillin: R >2      -  Gentamicin: S <=1 Should not be used as monotherapy      -  Daptomycin: S 1      -  Linezolid: S 2      -  Cefazolin: R >16      -  Vancomycin: S 2      -  Tetracycline: S <=1      Method Type: LEWIS      -  Ampicillin/Sulbactam: R <=8/4      -  Penicillin: R >8      -  Rifampin: S <=1 Should not be used as monotherapy      -  Erythromycin: R >4      -  Trimethoprim/Sulfamethoxazole: S <=0.5/9.5    Culture - Blood (collected 10-09-23 @ 07:00)  Source: .Blood Blood  Gram Stain (10-10-23 @ 06:14):    Growth in aerobic bottle: Gram Positive Cocci in Clusters    Growth in anaerobic bottle: Gram Positive Cocci in Clusters  Final Report (10-11-23 @ 12:08):    Growth in aerobic and anaerobic bottles: Methicillin Resistant    Staphylococcus aureus    See previous culture [id number]        RADIOLOGY & ADDITIONAL TESTS: Personally reviewed.       Consultant(s) Notes Reviewed:  [x] YES  [ ] NO   Discussed with TRACEY/PEPPER, RN     Patient is a 78y old  Male who presents with a chief complaint of Sepsis (13 Oct 2023 12:29)      INTERVAL HPI/OVERNIGHT EVENTS: Patient seen and examined at bedside. No overnight events.    MEDICATIONS  (STANDING):  apixaban 5 milliGRAM(s) Oral two times a day  aspirin  chewable 81 milliGRAM(s) Oral daily  ceftaroline fosamil IVPB      ceftaroline fosamil IVPB 200 milliGRAM(s) IV Intermittent every 8 hours  chlorhexidine 2% Cloths 1 Application(s) Topical <User Schedule>  DAPTOmycin IVPB 600 milliGRAM(s) IV Intermittent every 48 hours  epoetin val (PROCRIT) Injectable 28934 Unit(s) IV Push <User Schedule>  folic acid 1 milliGRAM(s) Oral daily  hydrocortisone hemorrhoidal Suppository 1 Suppository(s) Rectal two times a day  influenza  Vaccine (HIGH DOSE) 0.7 milliLiter(s) IntraMuscular once  levothyroxine 150 MICROGram(s) Oral daily  mesalamine Suppository 1000 milliGRAM(s) Rectal at bedtime  midodrine 10 milliGRAM(s) Oral every 8 hours  pantoprazole   Suspension 40 milliGRAM(s) Enteral Tube daily  polyethylene glycol 3350 17 Gram(s) Oral daily  senna 2 Tablet(s) Oral at bedtime    MEDICATIONS  (PRN):  acetaminophen     Tablet .. 650 milliGRAM(s) Oral every 6 hours PRN Mild Pain (1 - 3)  aluminum hydroxide/magnesium hydroxide/simethicone Suspension 10 milliLiter(s) Oral every 6 hours PRN dyspepsia  sodium chloride 0.9% lock flush 10 milliLiter(s) IV Push every 1 hour PRN Pre/post blood products, medications, blood draw, and to maintain line patency      Allergies    levofloxacin (Unknown)  alfuzosin (Unknown)  tamsulosin (Unknown)  Myrbetriq (Unknown)    Intolerances        Vital Signs Last 24 Hrs  T(C): 37.3 (13 Oct 2023 05:17), Max: 37.3 (12 Oct 2023 20:00)  T(F): 99.1 (13 Oct 2023 05:17), Max: 99.2 (12 Oct 2023 20:00)  HR: 110 (13 Oct 2023 05:17) (76 - 110)  BP: 111/66 (13 Oct 2023 05:17) (96/61 - 111/66)  BP(mean): --  RR: 18 (13 Oct 2023 05:17) (18 - 18)  SpO2: 92% (13 Oct 2023 05:17) (92% - 94%)    Parameters below as of 13 Oct 2023 05:17  Patient On (Oxygen Delivery Method): room air      I&O's Summary    12 Oct 2023 07:01  -  13 Oct 2023 07:00  --------------------------------------------------------  IN: 100 mL / OUT: 0 mL / NET: 100 mL        PHYSICAL EXAM:  GENERAL: NAD  HEENT:  AT/NC, moist mucous membranes  CHEST/LUNG:  CTA b/l, no rales, wheezes, or rhonchi,  normal respiratory effort, no intercostal retractions  HEART:  irregular HR, S1, S2  VASCULAR: rt IJ shiley in place  ABDOMEN:  BS+, soft, nontender, nondistended  MSK/EXTREMITIES: 2+ peripheral pulses, b/l upper ext edema, nontender, edema rt foot  NERVOUS SYSTEM: awake, AOx1      LABS: Personally reviewed  CBC                        7.3    15.93 )-----------( 154      ( 13 Oct 2023 06:00 )             22.1     CMP  10-13    135  |  100  |  48  ----------------------------<  90  4.2   |  27  |  4.10    Ca    8.9      13 Oct 2023 06:00  Phos  3.8     10-12  Mg     1.7     10-12    TPro  6.3  /  Alb  1.9  /  TBili  0.8  /  DBili  x   /  AST  15  /  ALT  7   /  AlkPhos  183  10-13                CARDIAC MARKERS ( 11 Oct 2023 05:26 )  x     / x     / <7 U/L / x     / x                            Urinalysis Basic - ( 13 Oct 2023 06:00 )    Color: x / Appearance: x / SG: x / pH: x  Gluc: 90 mg/dL / Ketone: x  / Bili: x / Urobili: x   Blood: x / Protein: x / Nitrite: x   Leuk Esterase: x / RBC: x / WBC x   Sq Epi: x / Non Sq Epi: x / Bacteria: x        Culture - Blood (collected 11 Oct 2023 05:52)  Source: .Blood Blood-Peripheral  Gram Stain (12 Oct 2023 09:49):    Growth in aerobic bottle: Gram Positive Cocci in Clusters    Growth in anaerobic bottle: Gram Positive Cocci in Clusters  Final Report (13 Oct 2023 06:31):    Growth in aerobic and anaerobic bottles: Methicillin Resistant    Staphylococcus aureus    See previous culture [id number]    Culture - Blood (collected 11 Oct 2023 05:26)  Source: .Blood Blood-Peripheral  Gram Stain (13 Oct 2023 03:20):    Growth in aerobic bottle: Gram Positive Cocci in Clusters    Growth in anaerobic bottle: Gram Positive Cocci in Clusters  Preliminary Report (13 Oct 2023 03:20):    Growth in aerobic bottle: Gram Positive Cocci in Clusters    Growth in anaerobic bottle: Gram Positive Cocci in Clusters    Culture - Blood (collected 09 Oct 2023 07:00)  Source: .Blood Blood  Gram Stain (10 Oct 2023 09:30):    Growth in aerobic bottle: Gram Positive Cocci in Clusters    Growth in anaerobic bottle: Gram Positive Cocci in Clusters  Final Report (11 Oct 2023 14:50):    Growth in aerobic and anaerobic bottles: Methicillin Resistant    Staphylococcus aureus  Organism: Methicillin resistant Staphylococcus aureus (11 Oct 2023 14:50)  Organism: Methicillin resistant Staphylococcus aureus (11 Oct 2023 14:50)      -  Clindamycin: R >4      -  Oxacillin: R >2      -  Gentamicin: S <=1 Should not be used as monotherapy      -  Daptomycin: S 1      -  Linezolid: S 2      -  Cefazolin: R >16      -  Vancomycin: S 2      -  Tetracycline: S <=1      Method Type: LEWIS      -  Ampicillin/Sulbactam: R <=8/4      -  Penicillin: R >8      -  Rifampin: S <=1 Should not be used as monotherapy      -  Erythromycin: R >4      -  Trimethoprim/Sulfamethoxazole: S <=0.5/9.5    Culture - Blood (collected 09 Oct 2023 07:00)  Source: .Blood Blood  Gram Stain (10 Oct 2023 06:14):    Growth in aerobic bottle: Gram Positive Cocci in Clusters    Growth in anaerobic bottle: Gram Positive Cocci in Clusters  Final Report (11 Oct 2023 12:08):    Growth in aerobic and anaerobic bottles: Methicillin Resistant    Staphylococcus aureus    See previous culture [id number]            Culture - Blood (collected 10-11-23 @ 05:52)  Source: .Blood Blood-Peripheral  Gram Stain (10-12-23 @ 09:49):    Growth in aerobic bottle: Gram Positive Cocci in Clusters    Growth in anaerobic bottle: Gram Positive Cocci in Clusters  Final Report (10-13-23 @ 06:31):    Growth in aerobic and anaerobic bottles: Methicillin Resistant    Staphylococcus aureus    See previous culture [id number]    Culture - Blood (collected 10-11-23 @ 05:26)  Source: .Blood Blood-Peripheral  Gram Stain (10-13-23 @ 03:20):    Growth in aerobic bottle: Gram Positive Cocci in Clusters    Growth in anaerobic bottle: Gram Positive Cocci in Clusters  Preliminary Report (10-13-23 @ 03:20):    Growth in aerobic bottle: Gram Positive Cocci in Clusters    Growth in anaerobic bottle: Gram Positive Cocci in Clusters    Culture - Blood (collected 10-09-23 @ 07:00)  Source: .Blood Blood  Gram Stain (10-10-23 @ 09:30):    Growth in aerobic bottle: Gram Positive Cocci in Clusters    Growth in anaerobic bottle: Gram Positive Cocci in Clusters  Final Report (10-11-23 @ 14:50):    Growth in aerobic and anaerobic bottles: Methicillin Resistant    Staphylococcus aureus  Organism: Methicillin resistant Staphylococcus aureus (10-11-23 @ 14:50)  Organism: Methicillin resistant Staphylococcus aureus (10-11-23 @ 14:50)      -  Clindamycin: R >4      -  Oxacillin: R >2      -  Gentamicin: S <=1 Should not be used as monotherapy      -  Daptomycin: S 1      -  Linezolid: S 2      -  Cefazolin: R >16      -  Vancomycin: S 2      -  Tetracycline: S <=1      Method Type: LEWIS      -  Ampicillin/Sulbactam: R <=8/4      -  Penicillin: R >8      -  Rifampin: S <=1 Should not be used as monotherapy      -  Erythromycin: R >4      -  Trimethoprim/Sulfamethoxazole: S <=0.5/9.5    Culture - Blood (collected 10-09-23 @ 07:00)  Source: .Blood Blood  Gram Stain (10-10-23 @ 06:14):    Growth in aerobic bottle: Gram Positive Cocci in Clusters    Growth in anaerobic bottle: Gram Positive Cocci in Clusters  Final Report (10-11-23 @ 12:08):    Growth in aerobic and anaerobic bottles: Methicillin Resistant    Staphylococcus aureus    See previous culture [id number]        RADIOLOGY & ADDITIONAL TESTS: Personally reviewed.       Consultant(s) Notes Reviewed:  [x] YES  [ ] NO   Discussed with TRACEY/PEPPER, RN     Patient is a 78y old  Male who presents with a chief complaint of Sepsis (13 Oct 2023 12:29)      INTERVAL HPI/OVERNIGHT EVENTS: Patient seen and examined at bedside. No overnight events.    MEDICATIONS  (STANDING):  apixaban 5 milliGRAM(s) Oral two times a day  aspirin  chewable 81 milliGRAM(s) Oral daily  ceftaroline fosamil IVPB      ceftaroline fosamil IVPB 200 milliGRAM(s) IV Intermittent every 8 hours  chlorhexidine 2% Cloths 1 Application(s) Topical <User Schedule>  DAPTOmycin IVPB 600 milliGRAM(s) IV Intermittent every 48 hours  epoetin val (PROCRIT) Injectable 40728 Unit(s) IV Push <User Schedule>  folic acid 1 milliGRAM(s) Oral daily  hydrocortisone hemorrhoidal Suppository 1 Suppository(s) Rectal two times a day  influenza  Vaccine (HIGH DOSE) 0.7 milliLiter(s) IntraMuscular once  levothyroxine 150 MICROGram(s) Oral daily  mesalamine Suppository 1000 milliGRAM(s) Rectal at bedtime  midodrine 10 milliGRAM(s) Oral every 8 hours  pantoprazole   Suspension 40 milliGRAM(s) Enteral Tube daily  polyethylene glycol 3350 17 Gram(s) Oral daily  senna 2 Tablet(s) Oral at bedtime    MEDICATIONS  (PRN):  acetaminophen     Tablet .. 650 milliGRAM(s) Oral every 6 hours PRN Mild Pain (1 - 3)  aluminum hydroxide/magnesium hydroxide/simethicone Suspension 10 milliLiter(s) Oral every 6 hours PRN dyspepsia  sodium chloride 0.9% lock flush 10 milliLiter(s) IV Push every 1 hour PRN Pre/post blood products, medications, blood draw, and to maintain line patency      Allergies    levofloxacin (Unknown)  alfuzosin (Unknown)  tamsulosin (Unknown)  Myrbetriq (Unknown)    Intolerances        Vital Signs Last 24 Hrs  T(C): 37.3 (13 Oct 2023 05:17), Max: 37.3 (12 Oct 2023 20:00)  T(F): 99.1 (13 Oct 2023 05:17), Max: 99.2 (12 Oct 2023 20:00)  HR: 110 (13 Oct 2023 05:17) (76 - 110)  BP: 111/66 (13 Oct 2023 05:17) (96/61 - 111/66)  BP(mean): --  RR: 18 (13 Oct 2023 05:17) (18 - 18)  SpO2: 92% (13 Oct 2023 05:17) (92% - 94%)    Parameters below as of 13 Oct 2023 05:17  Patient On (Oxygen Delivery Method): room air      I&O's Summary    12 Oct 2023 07:01  -  13 Oct 2023 07:00  --------------------------------------------------------  IN: 100 mL / OUT: 0 mL / NET: 100 mL        PHYSICAL EXAM:  GENERAL: NAD  HEENT:  AT/NC, moist mucous membranes  CHEST/LUNG:  CTA b/l, no rales, wheezes, or rhonchi,  normal respiratory effort, no intercostal retractions  HEART:  irregular HR, S1, S2  VASCULAR: rt IJ shiley in place  ABDOMEN:  BS+, soft, nontender, nondistended  MSK/EXTREMITIES: 2+ peripheral pulses, b/l upper ext edema, nontender, edema rt foot  NERVOUS SYSTEM: awake, AOx1      LABS: Personally reviewed  CBC                        7.3    15.93 )-----------( 154      ( 13 Oct 2023 06:00 )             22.1     CMP  10-13    135  |  100  |  48  ----------------------------<  90  4.2   |  27  |  4.10    Ca    8.9      13 Oct 2023 06:00  Phos  3.8     10-12  Mg     1.7     10-12    TPro  6.3  /  Alb  1.9  /  TBili  0.8  /  DBili  x   /  AST  15  /  ALT  7   /  AlkPhos  183  10-13                CARDIAC MARKERS ( 11 Oct 2023 05:26 )  x     / x     / <7 U/L / x     / x                            Urinalysis Basic - ( 13 Oct 2023 06:00 )    Color: x / Appearance: x / SG: x / pH: x  Gluc: 90 mg/dL / Ketone: x  / Bili: x / Urobili: x   Blood: x / Protein: x / Nitrite: x   Leuk Esterase: x / RBC: x / WBC x   Sq Epi: x / Non Sq Epi: x / Bacteria: x        Culture - Blood (collected 11 Oct 2023 05:52)  Source: .Blood Blood-Peripheral  Gram Stain (12 Oct 2023 09:49):    Growth in aerobic bottle: Gram Positive Cocci in Clusters    Growth in anaerobic bottle: Gram Positive Cocci in Clusters  Final Report (13 Oct 2023 06:31):    Growth in aerobic and anaerobic bottles: Methicillin Resistant    Staphylococcus aureus    See previous culture [id number]    Culture - Blood (collected 11 Oct 2023 05:26)  Source: .Blood Blood-Peripheral  Gram Stain (13 Oct 2023 03:20):    Growth in aerobic bottle: Gram Positive Cocci in Clusters    Growth in anaerobic bottle: Gram Positive Cocci in Clusters  Preliminary Report (13 Oct 2023 03:20):    Growth in aerobic bottle: Gram Positive Cocci in Clusters    Growth in anaerobic bottle: Gram Positive Cocci in Clusters    Culture - Blood (collected 09 Oct 2023 07:00)  Source: .Blood Blood  Gram Stain (10 Oct 2023 09:30):    Growth in aerobic bottle: Gram Positive Cocci in Clusters    Growth in anaerobic bottle: Gram Positive Cocci in Clusters  Final Report (11 Oct 2023 14:50):    Growth in aerobic and anaerobic bottles: Methicillin Resistant    Staphylococcus aureus  Organism: Methicillin resistant Staphylococcus aureus (11 Oct 2023 14:50)  Organism: Methicillin resistant Staphylococcus aureus (11 Oct 2023 14:50)      -  Clindamycin: R >4      -  Oxacillin: R >2      -  Gentamicin: S <=1 Should not be used as monotherapy      -  Daptomycin: S 1      -  Linezolid: S 2      -  Cefazolin: R >16      -  Vancomycin: S 2      -  Tetracycline: S <=1      Method Type: LEWIS      -  Ampicillin/Sulbactam: R <=8/4      -  Penicillin: R >8      -  Rifampin: S <=1 Should not be used as monotherapy      -  Erythromycin: R >4      -  Trimethoprim/Sulfamethoxazole: S <=0.5/9.5    Culture - Blood (collected 09 Oct 2023 07:00)  Source: .Blood Blood  Gram Stain (10 Oct 2023 06:14):    Growth in aerobic bottle: Gram Positive Cocci in Clusters    Growth in anaerobic bottle: Gram Positive Cocci in Clusters  Final Report (11 Oct 2023 12:08):    Growth in aerobic and anaerobic bottles: Methicillin Resistant    Staphylococcus aureus    See previous culture [id number]            Culture - Blood (collected 10-11-23 @ 05:52)  Source: .Blood Blood-Peripheral  Gram Stain (10-12-23 @ 09:49):    Growth in aerobic bottle: Gram Positive Cocci in Clusters    Growth in anaerobic bottle: Gram Positive Cocci in Clusters  Final Report (10-13-23 @ 06:31):    Growth in aerobic and anaerobic bottles: Methicillin Resistant    Staphylococcus aureus    See previous culture [id number]    Culture - Blood (collected 10-11-23 @ 05:26)  Source: .Blood Blood-Peripheral  Gram Stain (10-13-23 @ 03:20):    Growth in aerobic bottle: Gram Positive Cocci in Clusters    Growth in anaerobic bottle: Gram Positive Cocci in Clusters  Preliminary Report (10-13-23 @ 03:20):    Growth in aerobic bottle: Gram Positive Cocci in Clusters    Growth in anaerobic bottle: Gram Positive Cocci in Clusters    Culture - Blood (collected 10-09-23 @ 07:00)  Source: .Blood Blood  Gram Stain (10-10-23 @ 09:30):    Growth in aerobic bottle: Gram Positive Cocci in Clusters    Growth in anaerobic bottle: Gram Positive Cocci in Clusters  Final Report (10-11-23 @ 14:50):    Growth in aerobic and anaerobic bottles: Methicillin Resistant    Staphylococcus aureus  Organism: Methicillin resistant Staphylococcus aureus (10-11-23 @ 14:50)  Organism: Methicillin resistant Staphylococcus aureus (10-11-23 @ 14:50)      -  Clindamycin: R >4      -  Oxacillin: R >2      -  Gentamicin: S <=1 Should not be used as monotherapy      -  Daptomycin: S 1      -  Linezolid: S 2      -  Cefazolin: R >16      -  Vancomycin: S 2      -  Tetracycline: S <=1      Method Type: LEWIS      -  Ampicillin/Sulbactam: R <=8/4      -  Penicillin: R >8      -  Rifampin: S <=1 Should not be used as monotherapy      -  Erythromycin: R >4      -  Trimethoprim/Sulfamethoxazole: S <=0.5/9.5    Culture - Blood (collected 10-09-23 @ 07:00)  Source: .Blood Blood  Gram Stain (10-10-23 @ 06:14):    Growth in aerobic bottle: Gram Positive Cocci in Clusters    Growth in anaerobic bottle: Gram Positive Cocci in Clusters  Final Report (10-11-23 @ 12:08):    Growth in aerobic and anaerobic bottles: Methicillin Resistant    Staphylococcus aureus    See previous culture [id number]        RADIOLOGY & ADDITIONAL TESTS: Personally reviewed.       Consultant(s) Notes Reviewed:  [x] YES  [ ] NO   Discussed with TRACEY/PEPPER, RN

## 2023-10-13 NOTE — PROGRESS NOTE ADULT - SUBJECTIVE AND OBJECTIVE BOX
Seen on HD, on RA    Vital Signs Last 24 Hrs  T(C): 36.7 (10-13-23 @ 12:52), Max: 37.3 (10-12-23 @ 20:00)  T(F): 98 (10-13-23 @ 12:52), Max: 99.2 (10-12-23 @ 20:00)  HR: 73 (10-13-23 @ 09:52) (73 - 110)  BP: 110/50 (10-13-23 @ 12:52) (93/60 - 111/66)  RR: 16 (10-13-23 @ 12:52) (16 - 18)  SpO2: 95% (10-13-23 @ 12:52) (92% - 95%)    s1s2  b/l air entry  soft, ND  sm edema, LUE AVF + bruit                                                                                            7.3    15.93 )-----------( 154      ( 13 Oct 2023 06:00 )             22.1     13 Oct 2023 06:00    135    |  100    |  48     ----------------------------<  90     4.2     |  27     |  4.10     Ca    8.9        13 Oct 2023 06:00  Phos  3.8       12 Oct 2023 07:13  Mg     1.7       12 Oct 2023 07:13    TPro  6.3    /  Alb  1.9    /  TBili  0.8    /  DBili  x      /  AST  15     /  ALT  7      /  AlkPhos  183    13 Oct 2023 06:00    LIVER FUNCTIONS - ( 13 Oct 2023 06:00 )  Alb: 1.9 g/dL / Pro: 6.3 g/dL / ALK PHOS: 183 U/L / ALT: 7 U/L / AST: 15 U/L / GGT: x           Culture - Blood (collected 11 Oct 2023 05:52)  Source: .Blood Blood-Peripheral  Gram Stain (12 Oct 2023 09:49):    Growth in aerobic bottle: Gram Positive Cocci in Clusters    Growth in anaerobic bottle: Gram Positive Cocci in Clusters  Final Report (13 Oct 2023 06:31):    Growth in aerobic and anaerobic bottles: Methicillin Resistant    Staphylococcus aureus    See previous culture [id number]    Culture - Blood (collected 11 Oct 2023 05:26)  Source: .Blood Blood-Peripheral  Gram Stain (13 Oct 2023 03:20):    Growth in aerobic bottle: Gram Positive Cocci in Clusters    Growth in anaerobic bottle: Gram Positive Cocci in Clusters  Preliminary Report (13 Oct 2023 15:25):    Growth in aerobic and anaerobic bottles: Methicillin Resistant    Staphylococcus aureus    See previous culture [id number]    acetaminophen     Tablet .. 650 milliGRAM(s) Oral every 6 hours PRN  aluminum hydroxide/magnesium hydroxide/simethicone Suspension 10 milliLiter(s) Oral every 6 hours PRN  apixaban 5 milliGRAM(s) Oral two times a day  aspirin  chewable 81 milliGRAM(s) Oral daily  ceftaroline fosamil IVPB      ceftaroline fosamil IVPB 200 milliGRAM(s) IV Intermittent every 8 hours  chlorhexidine 2% Cloths 1 Application(s) Topical <User Schedule>  DAPTOmycin IVPB 600 milliGRAM(s) IV Intermittent every 48 hours  epoetin val (PROCRIT) Injectable 83208 Unit(s) IV Push <User Schedule>  folic acid 1 milliGRAM(s) Oral daily  hydrocortisone hemorrhoidal Suppository 1 Suppository(s) Rectal two times a day  influenza  Vaccine (HIGH DOSE) 0.7 milliLiter(s) IntraMuscular once  levothyroxine 150 MICROGram(s) Oral daily  mesalamine Suppository 1000 milliGRAM(s) Rectal at bedtime  midodrine 10 milliGRAM(s) Oral every 8 hours  pantoprazole   Suspension 40 milliGRAM(s) Enteral Tube daily  polyethylene glycol 3350 17 Gram(s) Oral daily  senna 2 Tablet(s) Oral at bedtime  sodium chloride 0.9% lock flush 10 milliLiter(s) IV Push every 1 hour PRN    A/P:    ESRD on HD at [facility] MWF via perm cath  Hx Afib, CM, EF 45 - 50%  Has LUE AVF, not yet mature   Adm 9/30/23 w/MRSA bacteremia, persistent   Abx per ID  Perm cath d/c-d 10/2/23  Cath cx pos for MRSA  HD MWF via temp IJ HD cath (placed 10/4/23)  No fluid removed w/HD today due to hypotension  Endocarditis is suspected, however pt is determined not to be a surgical candidate per Cardiology  Overall options are limited and prognosis is poor  Palliative f/u appr  Comfort based approach is reasonable  Message left w/HCP to discuss     [phone] Seen on HD, on RA    Vital Signs Last 24 Hrs  T(C): 36.7 (10-13-23 @ 12:52), Max: 37.3 (10-12-23 @ 20:00)  T(F): 98 (10-13-23 @ 12:52), Max: 99.2 (10-12-23 @ 20:00)  HR: 73 (10-13-23 @ 09:52) (73 - 110)  BP: 110/50 (10-13-23 @ 12:52) (93/60 - 111/66)  RR: 16 (10-13-23 @ 12:52) (16 - 18)  SpO2: 95% (10-13-23 @ 12:52) (92% - 95%)    s1s2  b/l air entry  soft, ND  sm edema, LUE AVF + bruit                                                                                            7.3    15.93 )-----------( 154      ( 13 Oct 2023 06:00 )             22.1     13 Oct 2023 06:00    135    |  100    |  48     ----------------------------<  90     4.2     |  27     |  4.10     Ca    8.9        13 Oct 2023 06:00  Phos  3.8       12 Oct 2023 07:13  Mg     1.7       12 Oct 2023 07:13    TPro  6.3    /  Alb  1.9    /  TBili  0.8    /  DBili  x      /  AST  15     /  ALT  7      /  AlkPhos  183    13 Oct 2023 06:00    LIVER FUNCTIONS - ( 13 Oct 2023 06:00 )  Alb: 1.9 g/dL / Pro: 6.3 g/dL / ALK PHOS: 183 U/L / ALT: 7 U/L / AST: 15 U/L / GGT: x           Culture - Blood (collected 11 Oct 2023 05:52)  Source: .Blood Blood-Peripheral  Gram Stain (12 Oct 2023 09:49):    Growth in aerobic bottle: Gram Positive Cocci in Clusters    Growth in anaerobic bottle: Gram Positive Cocci in Clusters  Final Report (13 Oct 2023 06:31):    Growth in aerobic and anaerobic bottles: Methicillin Resistant    Staphylococcus aureus    See previous culture [id number]    Culture - Blood (collected 11 Oct 2023 05:26)  Source: .Blood Blood-Peripheral  Gram Stain (13 Oct 2023 03:20):    Growth in aerobic bottle: Gram Positive Cocci in Clusters    Growth in anaerobic bottle: Gram Positive Cocci in Clusters  Preliminary Report (13 Oct 2023 15:25):    Growth in aerobic and anaerobic bottles: Methicillin Resistant    Staphylococcus aureus    See previous culture [id number]    acetaminophen     Tablet .. 650 milliGRAM(s) Oral every 6 hours PRN  aluminum hydroxide/magnesium hydroxide/simethicone Suspension 10 milliLiter(s) Oral every 6 hours PRN  apixaban 5 milliGRAM(s) Oral two times a day  aspirin  chewable 81 milliGRAM(s) Oral daily  ceftaroline fosamil IVPB      ceftaroline fosamil IVPB 200 milliGRAM(s) IV Intermittent every 8 hours  chlorhexidine 2% Cloths 1 Application(s) Topical <User Schedule>  DAPTOmycin IVPB 600 milliGRAM(s) IV Intermittent every 48 hours  epoetin val (PROCRIT) Injectable 53838 Unit(s) IV Push <User Schedule>  folic acid 1 milliGRAM(s) Oral daily  hydrocortisone hemorrhoidal Suppository 1 Suppository(s) Rectal two times a day  influenza  Vaccine (HIGH DOSE) 0.7 milliLiter(s) IntraMuscular once  levothyroxine 150 MICROGram(s) Oral daily  mesalamine Suppository 1000 milliGRAM(s) Rectal at bedtime  midodrine 10 milliGRAM(s) Oral every 8 hours  pantoprazole   Suspension 40 milliGRAM(s) Enteral Tube daily  polyethylene glycol 3350 17 Gram(s) Oral daily  senna 2 Tablet(s) Oral at bedtime  sodium chloride 0.9% lock flush 10 milliLiter(s) IV Push every 1 hour PRN    A/P:    ESRD on HD at [facility] MWF via perm cath  Hx Afib, CM, EF 45 - 50%  Has LUE AVF, not yet mature   Adm 9/30/23 w/MRSA bacteremia, persistent   Abx per ID  Perm cath d/c-d 10/2/23  Cath cx pos for MRSA  HD MWF via temp IJ HD cath (placed 10/4/23)  No fluid removed w/HD today due to hypotension  Endocarditis is suspected, however pt is determined not to be a surgical candidate per Cardiology  Overall options are limited and prognosis is poor  Palliative f/u appr  Comfort based approach is reasonable  Message left w/HCP to discuss     [phone] Seen on HD, on RA    Vital Signs Last 24 Hrs  T(C): 36.7 (10-13-23 @ 12:52), Max: 37.3 (10-12-23 @ 20:00)  T(F): 98 (10-13-23 @ 12:52), Max: 99.2 (10-12-23 @ 20:00)  HR: 73 (10-13-23 @ 09:52) (73 - 110)  BP: 110/50 (10-13-23 @ 12:52) (93/60 - 111/66)  RR: 16 (10-13-23 @ 12:52) (16 - 18)  SpO2: 95% (10-13-23 @ 12:52) (92% - 95%)    s1s2  b/l air entry  soft, ND  sm edema, LUE AVF + bruit                                                                                            7.3    15.93 )-----------( 154      ( 13 Oct 2023 06:00 )             22.1     13 Oct 2023 06:00    135    |  100    |  48     ----------------------------<  90     4.2     |  27     |  4.10     Ca    8.9        13 Oct 2023 06:00  Phos  3.8       12 Oct 2023 07:13  Mg     1.7       12 Oct 2023 07:13    TPro  6.3    /  Alb  1.9    /  TBili  0.8    /  DBili  x      /  AST  15     /  ALT  7      /  AlkPhos  183    13 Oct 2023 06:00    LIVER FUNCTIONS - ( 13 Oct 2023 06:00 )  Alb: 1.9 g/dL / Pro: 6.3 g/dL / ALK PHOS: 183 U/L / ALT: 7 U/L / AST: 15 U/L / GGT: x           Culture - Blood (collected 11 Oct 2023 05:52)  Source: .Blood Blood-Peripheral  Gram Stain (12 Oct 2023 09:49):    Growth in aerobic bottle: Gram Positive Cocci in Clusters    Growth in anaerobic bottle: Gram Positive Cocci in Clusters  Final Report (13 Oct 2023 06:31):    Growth in aerobic and anaerobic bottles: Methicillin Resistant    Staphylococcus aureus    See previous culture [id number]    Culture - Blood (collected 11 Oct 2023 05:26)  Source: .Blood Blood-Peripheral  Gram Stain (13 Oct 2023 03:20):    Growth in aerobic bottle: Gram Positive Cocci in Clusters    Growth in anaerobic bottle: Gram Positive Cocci in Clusters  Preliminary Report (13 Oct 2023 15:25):    Growth in aerobic and anaerobic bottles: Methicillin Resistant    Staphylococcus aureus    See previous culture [id number]    acetaminophen     Tablet .. 650 milliGRAM(s) Oral every 6 hours PRN  aluminum hydroxide/magnesium hydroxide/simethicone Suspension 10 milliLiter(s) Oral every 6 hours PRN  apixaban 5 milliGRAM(s) Oral two times a day  aspirin  chewable 81 milliGRAM(s) Oral daily  ceftaroline fosamil IVPB      ceftaroline fosamil IVPB 200 milliGRAM(s) IV Intermittent every 8 hours  chlorhexidine 2% Cloths 1 Application(s) Topical <User Schedule>  DAPTOmycin IVPB 600 milliGRAM(s) IV Intermittent every 48 hours  epoetin val (PROCRIT) Injectable 60635 Unit(s) IV Push <User Schedule>  folic acid 1 milliGRAM(s) Oral daily  hydrocortisone hemorrhoidal Suppository 1 Suppository(s) Rectal two times a day  influenza  Vaccine (HIGH DOSE) 0.7 milliLiter(s) IntraMuscular once  levothyroxine 150 MICROGram(s) Oral daily  mesalamine Suppository 1000 milliGRAM(s) Rectal at bedtime  midodrine 10 milliGRAM(s) Oral every 8 hours  pantoprazole   Suspension 40 milliGRAM(s) Enteral Tube daily  polyethylene glycol 3350 17 Gram(s) Oral daily  senna 2 Tablet(s) Oral at bedtime  sodium chloride 0.9% lock flush 10 milliLiter(s) IV Push every 1 hour PRN    A/P:    ESRD on HD at [facility] MWF via perm cath  Hx Afib, CM, EF 45 - 50%  Has LUE AVF, not yet mature   Adm 9/30/23 w/MRSA bacteremia, persistent   Abx per ID  Perm cath d/c-d 10/2/23  Cath cx pos for MRSA  HD MWF via temp IJ HD cath (placed 10/4/23)  No fluid removed w/HD today due to hypotension  Endocarditis is suspected, however pt is determined not to be a surgical candidate per Cardiology  Overall options are limited and prognosis is poor  Palliative f/u appr  Comfort based approach is reasonable  Message left w/HCP to discuss     [phone]

## 2023-10-13 NOTE — PROGRESS NOTE ADULT - CONVERSATION/DISCUSSION
Diagnosis/MOLST Discussed/Treatment Options/Chaplaincy Referral/Palliative Care Referral
Diagnosis/Prognosis/Treatment Options/Palliative Care Referral
Diagnosis/Prognosis/MOLST Discussed/Palliative Care Referral
Diagnosis/MOLST Discussed/Palliative Care Referral

## 2023-10-13 NOTE — PROGRESS NOTE ADULT - CONVERSATION DETAILS
spoke to Surekha /Hcp this morning  by phone , we continued our discussion on  where pt should go from here, Surekha aware pt is not recovering from the infection , and she is also aware he is not a surgical candidate.  Reviewed again  that he is not clearing the infection even w. the antbx and we are concerned his new HD catheter  could possibly become infected as well .  Discussed most recent bld cx's are still positive. Discussed  if she thinks pt would want to continue all of these interventions, HD, IV antbx ,  knowing his overall poor prognosis .   Surekha expressed difficulty in making the decision to stop the HD and antbx without first speaking w/ the med team and the consultants involved. She did say she again spoke w/ her out pt Dr  for guidance,  he recommended to her to continue tx's if they are being offered .

## 2023-10-13 NOTE — PROGRESS NOTE ADULT - ASSESSMENT
Patient is a 78M h/o ESRD, Afib on Eliquis, CAD s/p PCI, prosthetic s/p MVR, h/o proctitis, hemorrhoids, hypothyroidism was in ICU for metabolic encephalopathy, septic shock 2/2 MRSA bacteremia s/p tunnel cath removal 10/3, IJ Shiley placed 10/4, downgraded to 3E for further management. Patient continues to be bacteremic.    #Septic shock 2/2 MRSA bacteremia  #Metabolic encephalopathy  - awake, AOx0-1  - CTH 10/4 was negative for acute intracranial pathology  - plan to wean off midodrine as tolerated  - maintain O2 sat >94%  - speech and swallow eval: puree with thin liquids  - Aspiration precaution    #MRSA bacteremia  - permacath removed 10/3, shiley placed 10/4  - TTE 10/1/23 EF 45-50%, no vegetations  - WBCs downtrending  - c/w ceftaroline q8  - switched vancomycin to daptomycin by ID, recs appreciated  - BCx 10/6 +MRSA, BCx 10/9 gm positive clusters  - BCx 10/11 gm positive clusters  - concern for endocarditis, but not a candidate for valve replacements  - CT chest, abd/pelvis 10/11: b/l pleural effusions with lower lobe atelectatic changes, parenchymal infiltrate/consolidation, interlobular septal thickening. Renal calculi, horshoe kidney, suspected bladder wall thickening.  - ID: can hold on ERENDIRA as TTE showed probable vegetations on mvr  - Patient continues to be bacteremic despite antibiotic coverage; prognosis is poor.    #ESRD  - HD M/W/F  - Sofía placed in ICU 10/4  - Nephro consult appreciated  - daily weights    Anemia of ESRD/AOCD  -H/H stable  -FOBT negative  -Epo per nephrology   -Monitor H/H    Thrombocytopenia (Improved)  -Heme following  -Likely secondary to sepsis  -Monitor counts    #Afib  - CHADsVASc 5  - rate controlled  - h/o CAD s/p PCI, s/p MVR  - c/w Eliquis 5mg bid  - c/w ASA    #Hypothyroidism  - TSH elevated  - synthroid dose increased to 150mcg  - F/U outpatient to titrate as needed    #h/o hemorrhoids, proctitis  - c/w hydrocortisone suppository, mesalamine  - c/w bowel regimen    #DVT ppx  - Eliquis 5mg bid    #GI ppx  - c/w pantoprazole 40mg qd    Full code. Palliative on board. Patient is a 78M h/o ESRD, Afib on Eliquis, CAD s/p PCI, prosthetic s/p MVR, h/o proctitis, hemorrhoids, hypothyroidism was in ICU for metabolic encephalopathy, septic shock 2/2 MRSA bacteremia s/p tunnel cath removal 10/3, IJ Shiley placed 10/4, downgraded to 3E for further management. Patient continues to be bacteremic and prognosis is poor, pending discussion with HCP of [facility], considering comfort care.    #Septic shock 2/2 MRSA bacteremia  #Metabolic encephalopathy  - awake, AOx1  - CTH 10/4 was negative for acute intracranial pathology  - plan to wean off midodrine as tolerated  - maintain O2 sat >94%  - speech and swallow eval: puree with thin liquids  - Aspiration precaution    #MRSA bacteremia  - permacath removed 10/3, shiley placed 10/4  - TTE 10/1/23 EF 45-50%, no vegetations  - WBCs downtrending  - c/w ceftaroline q8  - switched vancomycin to daptomycin by ID, recs appreciated  - BCx 10/6 +MRSA, BCx 10/9 gm positive clusters  - BCx 10/11 MRSA  - concern for endocarditis, but not a candidate for valve replacements  - CT chest, abd/pelvis 10/11: b/l pleural effusions with lower lobe atelectatic changes, parenchymal infiltrate/consolidation, interlobular septal thickening. Renal calculi, horshoe kidney, suspected bladder wall thickening.  - ID: can hold on ERENDIRA as TTE showed probable vegetations on mvr  - Patient continues to be bacteremic despite antibiotic coverage; prognosis is poor.    #ESRD  - HD M/W/F  - had HD 10/13  - Sofía placed in ICU 10/4  - Nephro consult appreciated  - daily weights    Anemia of ESRD/AOCD  -H/H stable  -FOBT negative  -Epo per nephrology   -Monitor H/H    Thrombocytopenia (Improved)  -Heme following  -Likely secondary to sepsis  -Monitor counts    #Afib  - CHADsVASc 5  - rate controlled  - h/o CAD s/p PCI, s/p MVR  - c/w Eliquis 5mg bid  - c/w ASA    #Hypothyroidism  - TSH elevated  - synthroid dose increased to 150mcg  - F/U outpatient to titrate as needed    #h/o hemorrhoids, proctitis  - c/w hydrocortisone suppository, mesalamine  - c/w bowel regimen    #DVT ppx  - Eliquis 5mg bid    #GI ppx  - c/w pantoprazole 40mg qd    DNR/DNI. Palliative on board. Patient is a 78M h/o ESRD, Afib on Eliquis, CAD s/p PCI, prosthetic s/p MVR, h/o proctitis, hemorrhoids, hypothyroidism was in ICU for metabolic encephalopathy, septic shock 2/2 MRSA bacteremia s/p tunnel cath removal 10/3, IJ Shiley placed 10/4, downgraded to 3E for further management. Patient continues to be bacteremic and prognosis is poor, pending discussion with HCP of [facility], considering comfort care.    #Septic shock 2/2 MRSA bacteremia  #Metabolic encephalopathy  - awake, AOx1  - CTH 10/4 was negative for acute intracranial pathology  - plan to wean off midodrine as tolerated  - maintain O2 sat >94%  - speech and swallow eval: puree with thin liquids  - Aspiration precaution    #MRSA bacteremia  - permacath removed 10/3, shiley placed 10/4  - TTE 10/1/23 EF 45-50%, no vegetations  - WBCs downtrending  - c/w ceftaroline q8  - switched vancomycin to daptomycin by ID, recs appreciated  - BCx 10/6 +MRSA, BCx 10/9 gm positive clusters  - BCx 10/11 MRSA  - concern for endocarditis, but not a candidate for valve replacements  - CT chest, abd/pelvis 10/11: b/l pleural effusions with lower lobe atelectatic changes, parenchymal infiltrate/consolidation, interlobular septal thickening. Renal calculi, horshoe kidney, suspected bladder wall thickening.  - ID: can hold on ERENDIRA as TTE showed probable vegetations on mvr  - Patient continues to be bacteremic despite antibiotic coverage; prognosis is poor.    #ESRD  - HD M/W/F  - had HD 10/13  - Sofía placed in ICU 10/4  - Nephro consult appreciated  - daily weights    Anemia of ESRD/AOCD  -H/H stable  -FOBT negative  -Epo per nephrology   -Monitor H/H    Thrombocytopenia (Improved)  -Heme following  -Likely secondary to sepsis  -Monitor counts    #Afib  - CHADsVASc 5  - rate controlled  - h/o CAD s/p PCI, s/p MVR  - c/w Eliquis 5mg bid  - c/w ASA    #Hypothyroidism  - TSH elevated  - synthroid dose increased to 150mcg  - F/U outpatient to titrate as needed    #h/o hemorrhoids, proctitis  - c/w hydrocortisone suppository, mesalamine  - c/w bowel regimen    #DVT ppx  - Eliquis 5mg bid    #GI ppx  - c/w pantoprazole 40mg qd    DNR/DNI. Palliative on board. Patient is a 78M h/o ESRD, Afib on Eliquis, CAD s/p PCI, prosthetic s/p MVR, h/o proctitis, hemorrhoids, hypothyroidism was in ICU for metabolic encephalopathy, septic shock 2/2 MRSA bacteremia s/p tunnel cath removal 10/3, IJ Shiley placed 10/4, downgraded to 3E for further management. Patient continues to be bacteremic and prognosis is poor, pending discussion with HCP of [facility], considering comfort care.    #Septic shock 2/2 MRSA bacteremia  #Metabolic encephalopathy  - awake, AOx1  - CTH 10/4 was negative for acute intracranial pathology  - plan to wean off midodrine as tolerated  - maintain O2 sat >94%  - speech and swallow eval: puree with thin liquids  - Aspiration precaution    #MRSA bacteremia  - permacath removed 10/3, shiley placed 10/4  - TTE 10/1/23 EF 45-50%, no vegetations  - WBCs downtrending  - c/w ceftaroline q8  - switched vancomycin to daptomycin by ID, recs appreciated  - BCx 10/6 +MRSA, BCx 10/9 gm positive clusters  - BCx 10/11 MRSA  - concern for endocarditis, but not a candidate for valve replacements  - CT chest, abd/pelvis 10/11: b/l pleural effusions with lower lobe atelectatic changes, parenchymal infiltrate/consolidation, interlobular septal thickening. Renal calculi, horshoe kidney, suspected bladder wall thickening.  - ID: can hold on ERENDIRA as TTE showed probable vegetations on mvr  - Patient continues to be bacteremic despite antibiotic coverage; prognosis is poor.    #ESRD  - HD M/W/F  - had HD 10/13  - Sofía placed in ICU 10/4  - Nephro consult appreciated  - daily weights    Anemia of ESRD/AOCD  -H/H stable  -FOBT negative  -Epo per nephrology   -Monitor H/H    Thrombocytopenia (Improved)  -Heme following  -Likely secondary to sepsis  -Monitor counts    #Afib  - CHADsVASc 5  - rate controlled  - h/o CAD s/p PCI, s/p MVR  - c/w Eliquis 5mg bid  - c/w ASA    #Hypothyroidism  - TSH elevated  - synthroid dose increased to 150mcg  - F/U outpatient to titrate as needed    #h/o hemorrhoids, proctitis  - c/w hydrocortisone suppository, mesalamine  - c/w bowel regimen    #DVT ppx  - Eliquis 5mg bid    #GI ppx  - c/w pantoprazole 40mg qd    DNR/DNI. Palliative on board. Patient is a 78M h/o ESRD, Afib on Eliquis, CAD s/p PCI, prosthetic s/p MVR, h/o proctitis, hemorrhoids, hypothyroidism was in ICU for metabolic encephalopathy, septic shock 2/2 MRSA bacteremia s/p tunnel cath removal 10/3, IJ Shiley placed 10/4, downgraded to 3E for further management. Patient continues to be bacteremic and prognosis is poor.    Spoke to HCP Surekha Issa ([phone]) over the phone. She understands that patient's prognosis is poor and does not wish for Liam to "linger on" indefinitely receiving treatment. She agrees to transitioning patient to comfort care being that patient's doctor Dr. Lainez and our medical team agree that continued treatment will not improve patient's prognosis.    #Septic shock 2/2 MRSA bacteremia  #Metabolic encephalopathy  - awake, AOx1  - CTH 10/4 was negative for acute intracranial pathology  - plan to wean off midodrine as tolerated  - maintain O2 sat >94%  - speech and swallow eval: puree with thin liquids  - Aspiration precaution    #MRSA bacteremia  - permacath removed 10/3, shiley placed 10/4  - TTE 10/1/23 EF 45-50%, no vegetations  - WBCs downtrending  - c/w ceftaroline q8  - switched vancomycin to daptomycin by ID, recs appreciated  - BCx 10/6 +MRSA, BCx 10/9 gm positive clusters  - BCx 10/11 MRSA  - concern for endocarditis, but not a candidate for valve replacements  - CT chest, abd/pelvis 10/11: b/l pleural effusions with lower lobe atelectatic changes, parenchymal infiltrate/consolidation, interlobular septal thickening. Renal calculi, horshoe kidney, suspected bladder wall thickening.  - ID: can hold on ERENDIRA as TTE showed probable vegetations on mvr  - Patient continues to be bacteremic despite antibiotic coverage; prognosis is poor.    #ESRD  - HD M/W/F  - had HD 10/13  - Shiley placed in ICU 10/4  - Nephro consult appreciated  - daily weights    Anemia of ESRD/AOCD  -H/H stable  -FOBT negative  -Epo per nephrology   -Monitor H/H    Thrombocytopenia (Improved)  -Heme following  -Likely secondary to sepsis  -Monitor counts    #Afib  - CHADsVASc 5  - rate controlled  - h/o CAD s/p PCI, s/p MVR  - c/w Eliquis 5mg bid  - c/w ASA    #Hypothyroidism  - TSH elevated  - synthroid dose increased to 150mcg  - F/U outpatient to titrate as needed    #h/o hemorrhoids, proctitis  - c/w hydrocortisone suppository, mesalamine  - c/w bowel regimen    #DVT ppx  - Eliquis 5mg bid    #GI ppx  - c/w pantoprazole 40mg qd    DNR/DNI. Palliative on board. Patient is a 78M h/o ESRD, Afib on Eliquis, CAD s/p PCI, prosthetic s/p MVR, h/o proctitis, hemorrhoids, hypothyroidism was in ICU for metabolic encephalopathy, septic shock 2/2 MRSA bacteremia s/p tunnel cath removal 10/3, IJ Shiley placed 10/4, downgraded to 3E for further management. Patient continues to be bacteremic and prognosis is poor.    Spoke to HCP Surekha Issa ([phone]) over the phone. She understands that patient's prognosis is poor and does not wish for Liam to "linger on" indefinitely receiving treatment. She agrees to transitioning patient to comfort care being that patient's doctor Dr. Lainez and our medical team agree that continued treatment will not improve patient's prognosis.    #Septic shock 2/2 MRSA bacteremia  #Metabolic encephalopathy  - awake, AOx1  - CTH 10/4 was negative for acute intracranial pathology  - plan to wean off midodrine as tolerated  - maintain O2 sat >94%  - speech and swallow eval: puree with thin liquids  - Aspiration precaution    #MRSA bacteremia  - permacath removed 10/3, shiley placed 10/4  - TTE 10/1/23 EF 45-50%, no vegetations  - WBCs downtrending  - c/w ceftaroline q8  - switched vancomycin to daptomycin by ID, recs appreciated  - BCx 10/6 +MRSA, BCx 10/9 gm positive clusters  - BCx 10/11 MRSA  - concern for endocarditis, but not a candidate for valve replacements  - CT chest, abd/pelvis 10/11: b/l pleural effusions with lower lobe atelectatic changes, parenchymal infiltrate/consolidation, interlobular septal thickening. Renal calculi, horshoe kidney, suspected bladder wall thickening.  - ID: can hold on ERENDIRA as TTE showed probable vegetations on mvr  - Patient continues to be bacteremic despite antibiotic coverage; prognosis is poor.    #ESRD  - HD M/W/F  - had HD 10/13  - Shiley placed in ICU 10/4  - Nephro consult appreciated  - daily weights    Anemia of ESRD/AOCD  -H/H stable  -FOBT negative  -Epo per nephrology   -Monitor H/H    Thrombocytopenia (Improved)  -Heme following  -Likely secondary to sepsis  -Monitor counts    #Afib  - CHADsVASc 5  - rate controlled  - h/o CAD s/p PCI, s/p MVR  - c/w Eliquis 5mg bid  - c/w ASA    #Hypothyroidism  - TSH elevated  - synthroid dose increased to 150mcg  - F/U outpatient to titrate as needed    #h/o hemorrhoids, proctitis  - c/w hydrocortisone suppository, mesalamine  - c/w bowel regimen    #DVT ppx  - Eliquis 5mg bid    #GI ppx  - c/w pantoprazole 40mg qd    DNR/DNI. Palliative on board. Patient is a 78M h/o ESRD, Afib on Eliquis, CAD s/p PCI, prosthetic s/p MVR, h/o proctitis, hemorrhoids, hypothyroidism was in ICU for metabolic encephalopathy, septic shock 2/2 MRSA bacteremia s/p tunnel cath removal 10/3, IJ Shiley placed 10/4, downgraded to 3E for further management. Patient continues to be bacteremic and prognosis is poor.    Spoke to HCP Surekha Issa ([phone]) over the phone. She understands that patient's prognosis is poor and does not wish for Liam to "linger on" indefinitely receiving treatment. She agrees to transitioning patient to comfort care being that patient's doctor Dr. Lainez and our medical team agree that continued treatment will not improve patient's prognosis.    #Septic shock 2/2 MRSA bacteremia  #Metabolic encephalopathy  - awake, AOx1  - CTH 10/4 was negative for acute intracranial pathology  - plan to wean off midodrine as tolerated  - maintain O2 sat >94%  - speech and swallow eval: puree with thin liquids  - Aspiration precaution    #MRSA bacteremia  - permacath removed 10/3, shiley placed 10/4  - TTE 10/1/23 EF 45-50%, no vegetations  - WBCs downtrending  - c/w ceftaroline q8  - switched vancomycin to daptomycin by ID, recs appreciated  - BCx 10/6 +MRSA, BCx 10/9 gm positive clusters  - BCx 10/11 MRSA  - concern for endocarditis, but not a candidate for valve replacements  - CT chest, abd/pelvis 10/11: b/l pleural effusions with lower lobe atelectatic changes, parenchymal infiltrate/consolidation, interlobular septal thickening. Renal calculi, horshoe kidney, suspected bladder wall thickening.  - ID: can hold on ERENDIRA as TTE showed probable vegetations on mvr  - Patient continues to be bacteremic despite antibiotic coverage; prognosis is poor.    #ESRD  - HD M/W/F  - had HD 10/13  - Shiley placed in ICU 10/4  - Nephro consult appreciated  - daily weights    Anemia of ESRD/AOCD  -H/H stable  -FOBT negative  -Epo per nephrology   -Monitor H/H    Thrombocytopenia (Improved)  -Heme following  -Likely secondary to sepsis  -Monitor counts    #Afib  - CHADsVASc 5  - rate controlled  - h/o CAD s/p PCI, s/p MVR  - c/w Eliquis 5mg bid  - c/w ASA    #Hypothyroidism  - TSH elevated  - synthroid dose increased to 150mcg  - F/U outpatient to titrate as needed    #h/o hemorrhoids, proctitis  - c/w hydrocortisone suppository, mesalamine  - c/w bowel regimen    #DVT ppx  - Eliquis 5mg bid    #GI ppx  - c/w pantoprazole 40mg qd    DNR/DNI. Palliative on board.

## 2023-10-13 NOTE — PROGRESS NOTE ADULT - ASSESSMENT
A/P 77 yo M pmhx ESRD came to ED complaining of fatigue from [facility] . Patient  also febrile to 102.2 in ED with complains of cough and wheezing.  Noted to be hypotensive to 80's systolic. Course in ICU , now  downgraded to 3E.   Persistent MRSA bacteremia despite antibiotics and continues w/ AMS             assessment/Plans:      Septic shock 2/2 MRSA bacteremia  Metabolic encephalopathy  - drowsy , but wakes , AOx0-1 - no improvement in mentation   - CTH 10/4 was negative for acute intracranial pathology  - speech and swallow eval: puree with thin liquids  - Aspiration precaution       MRSA bacteremia  - perma cath removed 10/3, shiley placed 10/4  - TTE 10/1/23 EF 45-50%, no vegetations-Repeat TTE 10/11-  shows probable veg on mvr,  bc pos 10/11   - ID following  -             - continue antibiotics with daptomycin and ceftaroline.            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       ESRD  - HD M/W/F;   - Nephro follows -  appreciated        Palliative ;   as a follow up , chart reviewed, and case d/w med team Dr Aleman and nephrologist Dr Lainez today .  I saw pt bedside in AM before HD, no acute changes, mentation not improving much. Pt not in distress , no sob or discomfort noted.   I also spoke to Surekha /Hcp this morning  by phone , we continued our discussion on  where pt should go from here, Surekha aware pt is not recovering from the infection , and she is also aware he is not a surgical candidate.  I discussed that he is not clearing the infection even w. the antbx and we are concerned his new HD catheter  could become infected as well .  Discussed most recent bld cx's are still positive. Reviewed if she thinks pt would want to continue all of these interventions, HD, IV antbx ,  knowing his overall poor prognosis .   Surekha expressed difficulty in making the decision to stop the HD and antbx without first speaking w/ the med team and the consultants involved.   She did say she again spoke w/ her out pt   for guidance,  he recommended to her to continue tx's if they are being offered . I asked the med team and interdisciplinary teams to speak w/ Surekha as this may help her to make a decision towards a more comfort care approach .  Surekha and I did complete the Molst form yesterday for dnr/dni and no feeding tubes.   Recommend ,  comfort care approach at [facility] , appreciate med, and  interdisciplinary  teams input  .                      A/P 79 yo M pmhx ESRD came to ED complaining of fatigue from [facility] . Patient  also febrile to 102.2 in ED with complains of cough and wheezing.  Noted to be hypotensive to 80's systolic. Course in ICU , now  downgraded to 3E.   Persistent MRSA bacteremia despite antibiotics and continues w/ AMS             assessment/Plans:      Septic shock 2/2 MRSA bacteremia  Metabolic encephalopathy  - drowsy , but wakes , AOx0-1 - no improvement in mentation   - CTH 10/4 was negative for acute intracranial pathology  - speech and swallow eval: puree with thin liquids  - Aspiration precaution       MRSA bacteremia  - perma cath removed 10/3, shiley placed 10/4  - TTE 10/1/23 EF 45-50%, no vegetations-Repeat TTE 10/11-  shows probable veg on mvr,  bc pos 10/11   - ID following  -             - continue antibiotics with daptomycin and ceftaroline.            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       ESRD  - HD M/W/F;   - Nephro follows -  appreciated        Palliative ;   as a follow up , chart reviewed, and case d/w med team Dr Alemna and nephrologist Dr Lainez today .  I saw pt bedside in AM before HD, no acute changes, mentation not improving much. Pt not in distress , no sob or discomfort noted.   I also spoke to Surekha /Hcp this morning  by phone , we continued our discussion on  where pt should go from here, Surekha aware pt is not recovering from the infection , and she is also aware he is not a surgical candidate.  I discussed that he is not clearing the infection even w. the antbx and we are concerned his new HD catheter  could become infected as well .  Discussed most recent bld cx's are still positive. Reviewed if she thinks pt would want to continue all of these interventions, HD, IV antbx ,  knowing his overall poor prognosis .   Surekha expressed difficulty in making the decision to stop the HD and antbx without first speaking w/ the med team and the consultants involved.   She did say she again spoke w/ her out pt   for guidance,  he recommended to her to continue tx's if they are being offered . I asked the med team and interdisciplinary teams to speak w/ Surekha as this may help her to make a decision towards a more comfort care approach .  Surekha and I did complete the Molst form yesterday for dnr/dni and no feeding tubes.   Recommend ,  comfort care approach at [facility] , appreciate med, and  interdisciplinary  teams input  .                      A/P 77 yo M pmhx ESRD came to ED complaining of fatigue from [facility] . Patient  also febrile to 102.2 in ED with complains of cough and wheezing.  Noted to be hypotensive to 80's systolic. Course in ICU , now  downgraded to 3E.   Persistent MRSA bacteremia despite antibiotics and continues w/ AMS             assessment/Plans:      Septic shock 2/2 MRSA bacteremia  Metabolic encephalopathy  - drowsy , but wakes , AOx0-1 - no improvement in mentation   - CTH 10/4 was negative for acute intracranial pathology  - speech and swallow eval: puree with thin liquids  - Aspiration precaution       MRSA bacteremia  - perma cath removed 10/3, shiley placed 10/4  - TTE 10/1/23 EF 45-50%, no vegetations-Repeat TTE 10/11-  shows probable veg on mvr,  bc pos 10/11   - ID following  -             - continue antibiotics with daptomycin and ceftaroline.            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       ESRD  - HD M/W/F;   - Nephro follows -  appreciated        Palliative ;   as a follow up , chart reviewed, and case d/w med team Dr Aleman and nephrologist Dr Lainez today .  I saw pt bedside in AM before HD, no acute changes, mentation not improving much. Pt not in distress , no sob or discomfort noted.   I also spoke to Surekha /Hcp this morning  by phone , we continued our discussion on  where pt should go from here, Surekha aware pt is not recovering from the infection , and she is also aware he is not a surgical candidate.  I discussed that he is not clearing the infection even w. the antbx and we are concerned his new HD catheter  could become infected as well .  Discussed most recent bld cx's are still positive. Reviewed if she thinks pt would want to continue all of these interventions, HD, IV antbx ,  knowing his overall poor prognosis .   Surekha expressed difficulty in making the decision to stop the HD and antbx without first speaking w/ the med team and the consultants involved.   She did say she again spoke w/ her out pt   for guidance,  he recommended to her to continue tx's if they are being offered . I asked the med team and interdisciplinary teams to speak w/ Surekha as this may help her to make a decision towards a more comfort care approach .  Surekha and I did complete the Molst form yesterday for dnr/dni and no feeding tubes.   Recommend ,  comfort care approach at [facility] , appreciate med, and  interdisciplinary  teams input  .                      A/P 77 yo M pmhx ESRD came to ED complaining of fatigue from [facility] . Patient  also febrile to 102.2 in ED with complains of cough and wheezing.  Noted to be hypotensive to 80's systolic. Course in ICU , now  downgraded to 3E.   Persistent MRSA bacteremia despite antibiotics and continues w/ AMS             assessment/Plans:      Septic shock 2/2 MRSA bacteremia  Metabolic encephalopathy  - drowsy , but wakes , AOx0-1 - no improvement in mentation   - CTH 10/4 was negative for acute intracranial pathology  - speech and swallow eval: puree with thin liquids  - Aspiration precaution       MRSA bacteremia  - perma cath removed 10/3, shiley placed 10/4  - TTE 10/1/23 EF 45-50%, no vegetations-Repeat TTE 10/11-  shows probable veg on mvr,  bc pos 10/11   - ID following  -             - continue antibiotics with daptomycin and ceftaroline.            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       ESRD  - HD M/W/F;   - Nephro follows -  appreciated        Palliative ;   as a follow up , chart reviewed, and  I saw pt bedside in AM before HD, no acute changes, mentation not improving much. Pt not in distress , no sob or discomfort noted.   I discussed case with  med team Dr Aleman and nephrologist Dr Lainez today .   I had already discussed pt w/ cards team yesterday.  I asked  med team and interdisciplinary teams , if possible, to speak w/ Surekha as this may help her to make a decision towards a more comfort care approach .  Surekha and I did complete the Molst form yesterday for dnr/dni and no feeding tubes.     Recommend: multidisciplinary  approach  for  comfort care at [facility] , would appreciate med, and  interdisciplinary  teams input.                           A/P 79 yo M pmhx ESRD came to ED complaining of fatigue from [facility] . Patient  also febrile to 102.2 in ED with complains of cough and wheezing.  Noted to be hypotensive to 80's systolic. Course in ICU , now  downgraded to 3E.   Persistent MRSA bacteremia despite antibiotics and continues w/ AMS             assessment/Plans:      Septic shock 2/2 MRSA bacteremia  Metabolic encephalopathy  - drowsy , but wakes , AOx0-1 - no improvement in mentation   - CTH 10/4 was negative for acute intracranial pathology  - speech and swallow eval: puree with thin liquids  - Aspiration precaution       MRSA bacteremia  - perma cath removed 10/3, shiley placed 10/4  - TTE 10/1/23 EF 45-50%, no vegetations-Repeat TTE 10/11-  shows probable veg on mvr,  bc pos 10/11   - ID following  -             - continue antibiotics with daptomycin and ceftaroline.            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       ESRD  - HD M/W/F;   - Nephro follows -  appreciated        Palliative ;   as a follow up , chart reviewed, and  I saw pt bedside in AM before HD, no acute changes, mentation not improving much. Pt not in distress , no sob or discomfort noted.   I discussed case with  med team Dr Aleman and nephrologist Dr Lainez today .   I had already discussed pt w/ cards team yesterday.  I asked  med team and interdisciplinary teams , if possible, to speak w/ Surekha as this may help her to make a decision towards a more comfort care approach .  Surekha and I did complete the Molst form yesterday for dnr/dni and no feeding tubes.     Recommend: multidisciplinary  approach  for  comfort care at [facility] , would appreciate med, and  interdisciplinary  teams input.                           A/P 79 yo M pmhx ESRD came to ED complaining of fatigue from [facility] . Patient  also febrile to 102.2 in ED with complains of cough and wheezing.  Noted to be hypotensive to 80's systolic. Course in ICU , now  downgraded to 3E.   Persistent MRSA bacteremia despite antibiotics and continues w/ AMS             assessment/Plans:      Septic shock 2/2 MRSA bacteremia  Metabolic encephalopathy  - drowsy , but wakes , AOx0-1 - no improvement in mentation   - CTH 10/4 was negative for acute intracranial pathology  - speech and swallow eval: puree with thin liquids  - Aspiration precaution       MRSA bacteremia  - perma cath removed 10/3, shiley placed 10/4  - TTE 10/1/23 EF 45-50%, no vegetations-Repeat TTE 10/11-  shows probable veg on mvr,  bc pos 10/11   - ID following  -             - continue antibiotics with daptomycin and ceftaroline.            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       ESRD  - HD M/W/F;   - Nephro follows -  appreciated        Palliative ;   as a follow up , chart reviewed, and  I saw pt bedside in AM before HD, no acute changes, mentation not improving much. Pt not in distress , no sob or discomfort noted.   I discussed case with  med team Dr Aleman and nephrologist Dr Lainez today .   I had already discussed pt w/ cards team yesterday.  I asked  med team and interdisciplinary teams , if possible, to speak w/ Surekha as this may help her to make a decision towards a more comfort care approach .  Surekha and I did complete the Molst form yesterday for dnr/dni and no feeding tubes.     Recommend: multidisciplinary  approach  for  comfort care at [facility] , would appreciate med, and  interdisciplinary  teams input.

## 2023-10-13 NOTE — PROGRESS NOTE ADULT - SUBJECTIVE AND OBJECTIVE BOX
JANEE ARCEO, 78y Male  MRN: [id number]  ATTENDING: Martin Almaguer    HPI:  78M, PMHx MVR, PPM, s/p removal for MSSA infection, ESRD, on dialysis, Clostridium bacteremia in August (proctitis source) admitted with fever (102.2) found with high-grade sustained MRSA bacteremia.  On 10/6/2023 patient had permacath removed.  Brought spectrum antibiotic coverage.  Not a candidate for surgical valve removal.  Patient is poor historian.  Hematology consulted for anemia and thrombocytopenia.    MEDICATIONS:  acetaminophen     Tablet .. 650 milliGRAM(s) Oral every 6 hours PRN  aluminum hydroxide/magnesium hydroxide/simethicone Suspension 10 milliLiter(s) Oral every 6 hours PRN  aspirin  chewable 81 milliGRAM(s) Oral daily  ceftaroline fosamil IVPB 200 milliGRAM(s) IV Intermittent once  ceftaroline fosamil IVPB      ceftaroline fosamil IVPB 200 milliGRAM(s) IV Intermittent every 8 hours  chlorhexidine 2% Cloths 1 Application(s) Topical <User Schedule>  epoetin val (PROCRIT) Injectable 97328 Unit(s) IV Push <User Schedule>  folic acid 1 milliGRAM(s) Oral daily  heparin   Injectable 5000 Unit(s) SubCutaneous every 12 hours  hydrocortisone hemorrhoidal Suppository 1 Suppository(s) Rectal two times a day  influenza  Vaccine (HIGH DOSE) 0.7 milliLiter(s) IntraMuscular once  levothyroxine 137 MICROGram(s) Oral daily  mesalamine Suppository 1000 milliGRAM(s) Rectal at bedtime  midodrine 10 milliGRAM(s) Oral every 8 hours  pantoprazole   Suspension 40 milliGRAM(s) Enteral Tube daily  polyethylene glycol 3350 17 Gram(s) Oral daily  senna 2 Tablet(s) Oral at bedtime  sodium chloride 0.9% lock flush 10 milliLiter(s) IV Push every 1 hour PRN  vancomycin  IVPB 750 milliGRAM(s) IV Intermittent <User Schedule>    All other medications reviewed.    SUBJECTIVE:  Alert, at times confused.  Complaining of perioral numbness.      VITALS:  T(C): 36.6 (10-09-23 @ 12:30), Max: 37.7 (10-08-23 @ 21:19)  T(F): 97.9 (10-09-23 @ 12:30), Max: 99.9 (10-08-23 @ 21:19)  HR: 93 (10-09-23 @ 13:07) (74 - 122)  BP: 89/52 (10-09-23 @ 13:07) (89/52 - 127/66)      PHYSICAL EXAM:  Constitutional: alert, well developed  HEENT: normocephalic, anicteric sclerae, no mucositis or thrush  Respiratory: bilateral clear to auscultation anteriorly  Cardiovascular : S1, S2 regular, rhythmic, no murmurs, gallops or rubs  Abdomen: soft, distended, + normoactive BS, no palpable HS- megaly  Extremities: no tenderness;  -c/c/e, pulses equal bilaterally    LABS:  (10-09) WBC: 18.06 K/uL,Hemoglobin: 7.8 g/dL, Hematocrit: 22.2 %,  Platelet: 111 K/uL  (10-09) Na: 134 mmol/L ; K: 4.1 mmol/L ; BUN: 82 mg/dL ; Cr: 5.27 mg/dL.    RADIOLOGY:  ACC: [id number] EXAM:  CT BRAIN   ORDERED BY: JULIA CRUZ     PROCEDURE DATE:  10/04/2023          INTERPRETATION:  CLINICAL INFORMATION: Altered mental status.    TECHNIQUE:  Noncontrast axial CT images were acquired through the head.  Sagittal and coronal reformats were performed.    COMPARISON STUDY: None    FINDINGS:  There is no CT evidence of acute intracranial hemorrhage, mass effect or   midline shift.  There is no acute loss of gray matter-white matter   differentiation.    Slight prominence of the ventricles is compatible with mild age-related   involutional changes.  No clinically significant chronic microvascular   ischemic disease or other white matter pathology is visualized by CT   technique.      The paranasal sinuses and mastoids are grossly clear.    The patient is status post intraocular lens replacement bilaterally..    The calvarium and skull base appear within normal limits.    IMPRESSION:  No CT evidence of acute intracranial pathology.     JANEE ARCEO, 78y Male  MRN: [id number]  ATTENDING: Martin Almaguer    HPI:  78M, PMHx MVR, PPM, s/p removal for MSSA infection, ESRD, on dialysis, Clostridium bacteremia in August (proctitis source) admitted with fever (102.2) found with high-grade sustained MRSA bacteremia.  On 10/6/2023 patient had permacath removed.  Brought spectrum antibiotic coverage.  Not a candidate for surgical valve removal.  Patient is poor historian.  Hematology consulted for anemia and thrombocytopenia.    MEDICATIONS:  acetaminophen     Tablet .. 650 milliGRAM(s) Oral every 6 hours PRN  aluminum hydroxide/magnesium hydroxide/simethicone Suspension 10 milliLiter(s) Oral every 6 hours PRN  aspirin  chewable 81 milliGRAM(s) Oral daily  ceftaroline fosamil IVPB 200 milliGRAM(s) IV Intermittent once  ceftaroline fosamil IVPB      ceftaroline fosamil IVPB 200 milliGRAM(s) IV Intermittent every 8 hours  chlorhexidine 2% Cloths 1 Application(s) Topical <User Schedule>  epoetin val (PROCRIT) Injectable 60291 Unit(s) IV Push <User Schedule>  folic acid 1 milliGRAM(s) Oral daily  heparin   Injectable 5000 Unit(s) SubCutaneous every 12 hours  hydrocortisone hemorrhoidal Suppository 1 Suppository(s) Rectal two times a day  influenza  Vaccine (HIGH DOSE) 0.7 milliLiter(s) IntraMuscular once  levothyroxine 137 MICROGram(s) Oral daily  mesalamine Suppository 1000 milliGRAM(s) Rectal at bedtime  midodrine 10 milliGRAM(s) Oral every 8 hours  pantoprazole   Suspension 40 milliGRAM(s) Enteral Tube daily  polyethylene glycol 3350 17 Gram(s) Oral daily  senna 2 Tablet(s) Oral at bedtime  sodium chloride 0.9% lock flush 10 milliLiter(s) IV Push every 1 hour PRN  vancomycin  IVPB 750 milliGRAM(s) IV Intermittent <User Schedule>    All other medications reviewed.    SUBJECTIVE:  Alert, at times confused.  Complaining of perioral numbness.      VITALS:  T(C): 36.6 (10-09-23 @ 12:30), Max: 37.7 (10-08-23 @ 21:19)  T(F): 97.9 (10-09-23 @ 12:30), Max: 99.9 (10-08-23 @ 21:19)  HR: 93 (10-09-23 @ 13:07) (74 - 122)  BP: 89/52 (10-09-23 @ 13:07) (89/52 - 127/66)      PHYSICAL EXAM:  Constitutional: alert, well developed  HEENT: normocephalic, anicteric sclerae, no mucositis or thrush  Respiratory: bilateral clear to auscultation anteriorly  Cardiovascular : S1, S2 regular, rhythmic, no murmurs, gallops or rubs  Abdomen: soft, distended, + normoactive BS, no palpable HS- megaly  Extremities: no tenderness;  -c/c/e, pulses equal bilaterally    LABS:  (10-09) WBC: 18.06 K/uL,Hemoglobin: 7.8 g/dL, Hematocrit: 22.2 %,  Platelet: 111 K/uL  (10-09) Na: 134 mmol/L ; K: 4.1 mmol/L ; BUN: 82 mg/dL ; Cr: 5.27 mg/dL.    RADIOLOGY:  ACC: [id number] EXAM:  CT BRAIN   ORDERED BY: JULIA CRUZ     PROCEDURE DATE:  10/04/2023          INTERPRETATION:  CLINICAL INFORMATION: Altered mental status.    TECHNIQUE:  Noncontrast axial CT images were acquired through the head.  Sagittal and coronal reformats were performed.    COMPARISON STUDY: None    FINDINGS:  There is no CT evidence of acute intracranial hemorrhage, mass effect or   midline shift.  There is no acute loss of gray matter-white matter   differentiation.    Slight prominence of the ventricles is compatible with mild age-related   involutional changes.  No clinically significant chronic microvascular   ischemic disease or other white matter pathology is visualized by CT   technique.      The paranasal sinuses and mastoids are grossly clear.    The patient is status post intraocular lens replacement bilaterally..    The calvarium and skull base appear within normal limits.    IMPRESSION:  No CT evidence of acute intracranial pathology.     JANEE ACREO, 78y Male  MRN: [id number]  ATTENDING: Martin Almaguer    HPI:  78M, PMHx MVR, PPM, s/p removal for MSSA infection, ESRD, on dialysis, Clostridium bacteremia in August (proctitis source) admitted with fever (102.2) found with high-grade sustained MRSA bacteremia.  On 10/6/2023 patient had permacath removed.  Brought spectrum antibiotic coverage.  Not a candidate for surgical valve removal.  Patient is poor historian.  Hematology consulted for anemia and thrombocytopenia.    MEDICATIONS:  acetaminophen     Tablet .. 650 milliGRAM(s) Oral every 6 hours PRN  aluminum hydroxide/magnesium hydroxide/simethicone Suspension 10 milliLiter(s) Oral every 6 hours PRN  aspirin  chewable 81 milliGRAM(s) Oral daily  ceftaroline fosamil IVPB 200 milliGRAM(s) IV Intermittent once  ceftaroline fosamil IVPB      ceftaroline fosamil IVPB 200 milliGRAM(s) IV Intermittent every 8 hours  chlorhexidine 2% Cloths 1 Application(s) Topical <User Schedule>  epoetin val (PROCRIT) Injectable 16706 Unit(s) IV Push <User Schedule>  folic acid 1 milliGRAM(s) Oral daily  heparin   Injectable 5000 Unit(s) SubCutaneous every 12 hours  hydrocortisone hemorrhoidal Suppository 1 Suppository(s) Rectal two times a day  influenza  Vaccine (HIGH DOSE) 0.7 milliLiter(s) IntraMuscular once  levothyroxine 137 MICROGram(s) Oral daily  mesalamine Suppository 1000 milliGRAM(s) Rectal at bedtime  midodrine 10 milliGRAM(s) Oral every 8 hours  pantoprazole   Suspension 40 milliGRAM(s) Enteral Tube daily  polyethylene glycol 3350 17 Gram(s) Oral daily  senna 2 Tablet(s) Oral at bedtime  sodium chloride 0.9% lock flush 10 milliLiter(s) IV Push every 1 hour PRN  vancomycin  IVPB 750 milliGRAM(s) IV Intermittent <User Schedule>    All other medications reviewed.    SUBJECTIVE:  Alert, at times confused.  Complaining of perioral numbness.      VITALS:  T(C): 36.6 (10-09-23 @ 12:30), Max: 37.7 (10-08-23 @ 21:19)  T(F): 97.9 (10-09-23 @ 12:30), Max: 99.9 (10-08-23 @ 21:19)  HR: 93 (10-09-23 @ 13:07) (74 - 122)  BP: 89/52 (10-09-23 @ 13:07) (89/52 - 127/66)      PHYSICAL EXAM:  Constitutional: alert, well developed  HEENT: normocephalic, anicteric sclerae, no mucositis or thrush  Respiratory: bilateral clear to auscultation anteriorly  Cardiovascular : S1, S2 regular, rhythmic, no murmurs, gallops or rubs  Abdomen: soft, distended, + normoactive BS, no palpable HS- megaly  Extremities: no tenderness;  -c/c/e, pulses equal bilaterally    LABS:  (10-09) WBC: 18.06 K/uL,Hemoglobin: 7.8 g/dL, Hematocrit: 22.2 %,  Platelet: 111 K/uL  (10-09) Na: 134 mmol/L ; K: 4.1 mmol/L ; BUN: 82 mg/dL ; Cr: 5.27 mg/dL.    RADIOLOGY:  ACC: [id number] EXAM:  CT BRAIN   ORDERED BY: JULIA CRUZ     PROCEDURE DATE:  10/04/2023          INTERPRETATION:  CLINICAL INFORMATION: Altered mental status.    TECHNIQUE:  Noncontrast axial CT images were acquired through the head.  Sagittal and coronal reformats were performed.    COMPARISON STUDY: None    FINDINGS:  There is no CT evidence of acute intracranial hemorrhage, mass effect or   midline shift.  There is no acute loss of gray matter-white matter   differentiation.    Slight prominence of the ventricles is compatible with mild age-related   involutional changes.  No clinically significant chronic microvascular   ischemic disease or other white matter pathology is visualized by CT   technique.      The paranasal sinuses and mastoids are grossly clear.    The patient is status post intraocular lens replacement bilaterally..    The calvarium and skull base appear within normal limits.    IMPRESSION:  No CT evidence of acute intracranial pathology.     JANEE ARCEO, 78y Male  MRN: [id number]  ATTENDING: Martin Almaguer    HPI:  78M, PMHx MVR, PPM, s/p removal for MSSA infection, ESRD, on dialysis, Clostridium bacteremia in August (proctitis source) admitted with fever (102.2) found with high-grade sustained MRSA bacteremia.  On 10/6/2023 patient had permacath removed.  Brought spectrum antibiotic coverage.  Not a candidate for surgical valve removal.  Patient is poor historian.  Hematology consulted for anemia and thrombocytopenia.    MEDICATIONS:  acetaminophen     Tablet .. 650 milliGRAM(s) Oral every 6 hours PRN  aluminum hydroxide/magnesium hydroxide/simethicone Suspension 10 milliLiter(s) Oral every 6 hours PRN  aspirin  chewable 81 milliGRAM(s) Oral daily  ceftaroline fosamil IVPB 200 milliGRAM(s) IV Intermittent once  ceftaroline fosamil IVPB      ceftaroline fosamil IVPB 200 milliGRAM(s) IV Intermittent every 8 hours  chlorhexidine 2% Cloths 1 Application(s) Topical <User Schedule>  epoetin val (PROCRIT) Injectable 97389 Unit(s) IV Push <User Schedule>  folic acid 1 milliGRAM(s) Oral daily  heparin   Injectable 5000 Unit(s) SubCutaneous every 12 hours  hydrocortisone hemorrhoidal Suppository 1 Suppository(s) Rectal two times a day  influenza  Vaccine (HIGH DOSE) 0.7 milliLiter(s) IntraMuscular once  levothyroxine 137 MICROGram(s) Oral daily  mesalamine Suppository 1000 milliGRAM(s) Rectal at bedtime  midodrine 10 milliGRAM(s) Oral every 8 hours  pantoprazole   Suspension 40 milliGRAM(s) Enteral Tube daily  polyethylene glycol 3350 17 Gram(s) Oral daily  senna 2 Tablet(s) Oral at bedtime  sodium chloride 0.9% lock flush 10 milliLiter(s) IV Push every 1 hour PRN  vancomycin  IVPB 750 milliGRAM(s) IV Intermittent <User Schedule>    All other medications reviewed.    SUBJECTIVE:  Patient seen after dialysis; somnolent but arousable.  Offers no new complaints.      VITALS:  T(C): 36.6 (10-09-23 @ 12:30), Max: 37.7 (10-08-23 @ 21:19)  T(F): 97.9 (10-09-23 @ 12:30), Max: 99.9 (10-08-23 @ 21:19)  HR: 93 (10-09-23 @ 13:07) (74 - 122)  BP: 89/52 (10-09-23 @ 13:07) (89/52 - 127/66)      PHYSICAL EXAM:  Constitutional: alert, well developed  HEENT: normocephalic, anicteric sclerae, no mucositis or thrush  Respiratory: bilateral clear to auscultation anteriorly  Cardiovascular : S1, S2 regular, rhythmic, no murmurs, gallops or rubs  Abdomen: soft, distended, + normoactive BS, no palpable HS- megaly  Extremities: no tenderness;  -c/c/e, pulses equal bilaterally    LABS:  (10-09) WBC: 18.06 K/uL,Hemoglobin: 7.8 g/dL, Hematocrit: 22.2 %,  Platelet: 111 K/uL  (10-09) Na: 134 mmol/L ; K: 4.1 mmol/L ; BUN: 82 mg/dL ; Cr: 5.27 mg/dL.    RADIOLOGY:  ACC: [id number] EXAM:  CT BRAIN   ORDERED BY: JULIA CRUZ     PROCEDURE DATE:  10/04/2023          INTERPRETATION:  CLINICAL INFORMATION: Altered mental status.    TECHNIQUE:  Noncontrast axial CT images were acquired through the head.  Sagittal and coronal reformats were performed.    COMPARISON STUDY: None    FINDINGS:  There is no CT evidence of acute intracranial hemorrhage, mass effect or   midline shift.  There is no acute loss of gray matter-white matter   differentiation.    Slight prominence of the ventricles is compatible with mild age-related   involutional changes.  No clinically significant chronic microvascular   ischemic disease or other white matter pathology is visualized by CT   technique.      The paranasal sinuses and mastoids are grossly clear.    The patient is status post intraocular lens replacement bilaterally..    The calvarium and skull base appear within normal limits.    IMPRESSION:  No CT evidence of acute intracranial pathology.     JANEE ARCEO, 78y Male  MRN: [id number]  ATTENDING: Martin Almaguer    HPI:  78M, PMHx MVR, PPM, s/p removal for MSSA infection, ESRD, on dialysis, Clostridium bacteremia in August (proctitis source) admitted with fever (102.2) found with high-grade sustained MRSA bacteremia.  On 10/6/2023 patient had permacath removed.  Brought spectrum antibiotic coverage.  Not a candidate for surgical valve removal.  Patient is poor historian.  Hematology consulted for anemia and thrombocytopenia.    MEDICATIONS:  acetaminophen     Tablet .. 650 milliGRAM(s) Oral every 6 hours PRN  aluminum hydroxide/magnesium hydroxide/simethicone Suspension 10 milliLiter(s) Oral every 6 hours PRN  aspirin  chewable 81 milliGRAM(s) Oral daily  ceftaroline fosamil IVPB 200 milliGRAM(s) IV Intermittent once  ceftaroline fosamil IVPB      ceftaroline fosamil IVPB 200 milliGRAM(s) IV Intermittent every 8 hours  chlorhexidine 2% Cloths 1 Application(s) Topical <User Schedule>  epoetin val (PROCRIT) Injectable 19364 Unit(s) IV Push <User Schedule>  folic acid 1 milliGRAM(s) Oral daily  heparin   Injectable 5000 Unit(s) SubCutaneous every 12 hours  hydrocortisone hemorrhoidal Suppository 1 Suppository(s) Rectal two times a day  influenza  Vaccine (HIGH DOSE) 0.7 milliLiter(s) IntraMuscular once  levothyroxine 137 MICROGram(s) Oral daily  mesalamine Suppository 1000 milliGRAM(s) Rectal at bedtime  midodrine 10 milliGRAM(s) Oral every 8 hours  pantoprazole   Suspension 40 milliGRAM(s) Enteral Tube daily  polyethylene glycol 3350 17 Gram(s) Oral daily  senna 2 Tablet(s) Oral at bedtime  sodium chloride 0.9% lock flush 10 milliLiter(s) IV Push every 1 hour PRN  vancomycin  IVPB 750 milliGRAM(s) IV Intermittent <User Schedule>    All other medications reviewed.    SUBJECTIVE:  Patient seen after dialysis; somnolent but arousable.  Offers no new complaints.      VITALS:  T(C): 36.6 (10-09-23 @ 12:30), Max: 37.7 (10-08-23 @ 21:19)  T(F): 97.9 (10-09-23 @ 12:30), Max: 99.9 (10-08-23 @ 21:19)  HR: 93 (10-09-23 @ 13:07) (74 - 122)  BP: 89/52 (10-09-23 @ 13:07) (89/52 - 127/66)      PHYSICAL EXAM:  Constitutional: alert, well developed  HEENT: normocephalic, anicteric sclerae, no mucositis or thrush  Respiratory: bilateral clear to auscultation anteriorly  Cardiovascular : S1, S2 regular, rhythmic, no murmurs, gallops or rubs  Abdomen: soft, distended, + normoactive BS, no palpable HS- megaly  Extremities: no tenderness;  -c/c/e, pulses equal bilaterally    LABS:  (10-09) WBC: 18.06 K/uL,Hemoglobin: 7.8 g/dL, Hematocrit: 22.2 %,  Platelet: 111 K/uL  (10-09) Na: 134 mmol/L ; K: 4.1 mmol/L ; BUN: 82 mg/dL ; Cr: 5.27 mg/dL.    RADIOLOGY:  ACC: [id number] EXAM:  CT BRAIN   ORDERED BY: JULIA CRUZ     PROCEDURE DATE:  10/04/2023          INTERPRETATION:  CLINICAL INFORMATION: Altered mental status.    TECHNIQUE:  Noncontrast axial CT images were acquired through the head.  Sagittal and coronal reformats were performed.    COMPARISON STUDY: None    FINDINGS:  There is no CT evidence of acute intracranial hemorrhage, mass effect or   midline shift.  There is no acute loss of gray matter-white matter   differentiation.    Slight prominence of the ventricles is compatible with mild age-related   involutional changes.  No clinically significant chronic microvascular   ischemic disease or other white matter pathology is visualized by CT   technique.      The paranasal sinuses and mastoids are grossly clear.    The patient is status post intraocular lens replacement bilaterally..    The calvarium and skull base appear within normal limits.    IMPRESSION:  No CT evidence of acute intracranial pathology.     JANEE ARCEO, 78y Male  MRN: [id number]  ATTENDING: Martin Almaguer    HPI:  78M, PMHx MVR, PPM, s/p removal for MSSA infection, ESRD, on dialysis, Clostridium bacteremia in August (proctitis source) admitted with fever (102.2) found with high-grade sustained MRSA bacteremia.  On 10/6/2023 patient had permacath removed.  Brought spectrum antibiotic coverage.  Not a candidate for surgical valve removal.  Patient is poor historian.  Hematology consulted for anemia and thrombocytopenia.    MEDICATIONS:  acetaminophen     Tablet .. 650 milliGRAM(s) Oral every 6 hours PRN  aluminum hydroxide/magnesium hydroxide/simethicone Suspension 10 milliLiter(s) Oral every 6 hours PRN  aspirin  chewable 81 milliGRAM(s) Oral daily  ceftaroline fosamil IVPB 200 milliGRAM(s) IV Intermittent once  ceftaroline fosamil IVPB      ceftaroline fosamil IVPB 200 milliGRAM(s) IV Intermittent every 8 hours  chlorhexidine 2% Cloths 1 Application(s) Topical <User Schedule>  epoetin val (PROCRIT) Injectable 33995 Unit(s) IV Push <User Schedule>  folic acid 1 milliGRAM(s) Oral daily  heparin   Injectable 5000 Unit(s) SubCutaneous every 12 hours  hydrocortisone hemorrhoidal Suppository 1 Suppository(s) Rectal two times a day  influenza  Vaccine (HIGH DOSE) 0.7 milliLiter(s) IntraMuscular once  levothyroxine 137 MICROGram(s) Oral daily  mesalamine Suppository 1000 milliGRAM(s) Rectal at bedtime  midodrine 10 milliGRAM(s) Oral every 8 hours  pantoprazole   Suspension 40 milliGRAM(s) Enteral Tube daily  polyethylene glycol 3350 17 Gram(s) Oral daily  senna 2 Tablet(s) Oral at bedtime  sodium chloride 0.9% lock flush 10 milliLiter(s) IV Push every 1 hour PRN  vancomycin  IVPB 750 milliGRAM(s) IV Intermittent <User Schedule>    All other medications reviewed.    SUBJECTIVE:  Patient seen after dialysis; somnolent but arousable.  Offers no new complaints.      VITALS:  T(C): 36.6 (10-09-23 @ 12:30), Max: 37.7 (10-08-23 @ 21:19)  T(F): 97.9 (10-09-23 @ 12:30), Max: 99.9 (10-08-23 @ 21:19)  HR: 93 (10-09-23 @ 13:07) (74 - 122)  BP: 89/52 (10-09-23 @ 13:07) (89/52 - 127/66)      PHYSICAL EXAM:  Constitutional: alert, well developed  HEENT: normocephalic, anicteric sclerae, no mucositis or thrush  Respiratory: bilateral clear to auscultation anteriorly  Cardiovascular : S1, S2 regular, rhythmic, no murmurs, gallops or rubs  Abdomen: soft, distended, + normoactive BS, no palpable HS- megaly  Extremities: no tenderness;  -c/c/e, pulses equal bilaterally    LABS:  (10-09) WBC: 18.06 K/uL,Hemoglobin: 7.8 g/dL, Hematocrit: 22.2 %,  Platelet: 111 K/uL  (10-09) Na: 134 mmol/L ; K: 4.1 mmol/L ; BUN: 82 mg/dL ; Cr: 5.27 mg/dL.    RADIOLOGY:  ACC: [id number] EXAM:  CT BRAIN   ORDERED BY: JULIA CRUZ     PROCEDURE DATE:  10/04/2023          INTERPRETATION:  CLINICAL INFORMATION: Altered mental status.    TECHNIQUE:  Noncontrast axial CT images were acquired through the head.  Sagittal and coronal reformats were performed.    COMPARISON STUDY: None    FINDINGS:  There is no CT evidence of acute intracranial hemorrhage, mass effect or   midline shift.  There is no acute loss of gray matter-white matter   differentiation.    Slight prominence of the ventricles is compatible with mild age-related   involutional changes.  No clinically significant chronic microvascular   ischemic disease or other white matter pathology is visualized by CT   technique.      The paranasal sinuses and mastoids are grossly clear.    The patient is status post intraocular lens replacement bilaterally..    The calvarium and skull base appear within normal limits.    IMPRESSION:  No CT evidence of acute intracranial pathology.

## 2023-10-13 NOTE — PROGRESS NOTE ADULT - PROBLEM SELECTOR PLAN 1
Thrombocytopenia–improving.  Persistent aerobic bottle gram-positive cocci in clusters reported this a.m.  Continues on daptomycin and ceftaroline per ID.  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  Will remain under expectant hematologic surveillance.  No need for transfusion of platelets, no evidence of active bleeding. Sepsis–related cytopenias; thrombocytopenia resolved, however patient becomes more anemic.  Part of the anemia is due to prolonged hospitalization, infectious process, multiple antibiotic treatment, part is due to frequent phlebotomies.  Would reduce unnecessary phlebotomies and transfuse as needed.  Remains on antibiotics.  ID recommendations reviewed.  Transfuse as appropriate.

## 2023-10-13 NOTE — PROGRESS NOTE ADULT - SUBJECTIVE AND OBJECTIVE BOX
CC: f/u for  high grade sustained mrsa bacteremia  Patient reports nothing, sleeping after dialysis    REVIEW OF SYSTEMS:  All other review of systems negative (Comprehensive ROS)cannot get right now    Antimicrobials Day #  :3 this combination  ceftaroline fosamil IVPB 200 milliGRAM(s) IV Intermittent every 8 hours  ceftaroline fosamil IVPB      DAPTOmycin IVPB 600 milliGRAM(s) IV Intermittent every 48 hours    Other Medications Reviewed    T(F): 98 (10-13-23 @ 12:52), Max: 99.2 (10-12-23 @ 20:00)  HR: 73 (10-13-23 @ 09:52)  BP: 110/50 (10-13-23 @ 12:52)  RR: 16 (10-13-23 @ 12:52)  SpO2: 95% (10-13-23 @ 12:52)  Wt(kg): --    PHYSICAL EXAM:  General:  no acute distress  Eyes:  anicteric, no conjunctival injection, no discharge  Oropharynx: no lesions or injection 	  Neck: supple, without adenopathy  Lungs: clear to auscultation  Heart: regular rate and rhythm; no murmur, rubs or gallops  Abdomen: soft, nondistended, nontender, without mass or organomegaly  Skin: no lesions  Extremities: no clubbing, cyanosis, . arms with  edema  Neurologic: sleeps    LAB RESULTS:                        7.3    15.93 )-----------( 154      ( 13 Oct 2023 06:00 )             22.1     10-    135  |  100  |  48<H>  ----------------------------<  90  4.2   |  27  |  4.10<H>    Ca    8.9      13 Oct 2023 06:00  Phos  3.8     10-12  Mg     1.7     10-12    TPro  6.3  /  Alb  1.9<L>  /  TBili  0.8  /  DBili  x   /  AST  15  /  ALT  7<L>  /  AlkPhos  183<H>  10-13    LIVER FUNCTIONS - ( 13 Oct 2023 06:00 )  Alb: 1.9 g/dL / Pro: 6.3 g/dL / ALK PHOS: 183 U/L / ALT: 7 U/L / AST: 15 U/L / GGT: x           Urinalysis Basic - ( 13 Oct 2023 06:00 )    Color: x / Appearance: x / SG: x / pH: x  Gluc: 90 mg/dL / Ketone: x  / Bili: x / Urobili: x   Blood: x / Protein: x / Nitrite: x   Leuk Esterase: x / RBC: x / WBC x   Sq Epi: x / Non Sq Epi: x / Bacteria: x      MICROBIOLOGY:  RECENT CULTURES:  10-11 @ 05:52 .Blood Blood-Peripheral     Growth in aerobic and anaerobic bottles: Methicillin Resistant  Staphylococcus aureus  See previous culture -[id number]    Growth in aerobic bottle: Gram Positive Cocci in Clusters  Growth in anaerobic bottle: Gram Positive Cocci in Clusters    10-11 @ 05:26 .Blood Blood-Peripheral     Growth in aerobic and anaerobic bottles: Methicillin Resistant  Staphylococcus aureus  See previous culture -[id number]    Growth in aerobic bottle: Gram Positive Cocci in Clusters  Growth in anaerobic bottle: Gram Positive Cocci in Clusters    10-09 @ 07:00 .Blood Blood Methicillin resistant Staphylococcus aureus    Growth in aerobic and anaerobic bottles: Methicillin Resistant  Staphylococcus aureus  See previous culture -[id number]    Growth in aerobic bottle: Gram Positive Cocci in Clusters  Growth in anaerobic bottle: Gram Positive Cocci in Clusters        RADIOLOGY REVIEWED:    < from: CT Abdomen and Pelvis No Cont (10.11.23 @ 10:08) >    ACC: [id number] EXAM:  CT ABDOMEN AND PELVIS   ORDERED BY: SARA HERNANDEZ     ACC: [id number] EXAM:  CT CHEST   ORDERED BY: SARA HERNANDEZ     PROCEDURE DATE:  10/11/2023          INTERPRETATION:  CLINICAL INFORMATION: MRSA bacteremia.    COMPARISON: None.    CONTRAST/COMPLICATIONS:  IV Contrast: NONE  Oral Contrast: NONE  Complications: None reported at time of study completion    PROCEDURE:  CT of the Chest, Abdomen and Pelvis was performed.  Sagittal and coronal reformats were performed.    FINDINGS:  CHEST:  LUNGS AND LARGE AIRWAYS: Patent central airways. Bilateral lower lobe   atelectatic changes; underlying parenchymal infiltrate/consolidation   cannot be excluded. Interlobular septal thickening identified, raising   suspicion for an element of interstitial edema.  PLEURA: Moderate to large right pleural effusion. Small to moderate left   pleural effusion. No evidence for pneumothorax.  VESSELS: Coronary arterial calcifications. Atherosclerotic aortic   calcifications.  HEART: Cardiomegaly. No pericardial effusion. Micra pacemaker. 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  MEDIASTINUM AND VILLA: No lymphadenopathy.  CHEST WALL AND LOWER NECK: Within normal limits.    ABDOMEN AND PELVIS:  LIVER: The liver is normal in size. No focal hepatic masses are   identified.  BILE DUCTS: Normal caliber.  GALLBLADDER: Large gallstone measuring 2.8 cm. No definite gallbladder   wall thickening.  SPLEEN: Within normal limits.  PANCREAS: Within normal limits.  ADRENALS: Within normal limits.  KIDNEYS/URETERS: A horseshoe kidney, with bilateral renal calculi   measuring up to 10 mm. No hydronephrosis. No discrete space-occupying   lesions of the renal parenchyma noted.    BLADDER: Poorly distended; element of bladder wall thickening cannot be   excluded. Mild stranding of the perivesical fat noted; correlate for   cystitis.  REPRODUCTIVE ORGANS: Foci of prostatic calcification noted.    BOWEL: Fecal material scattered throughout the colon. No evidence for   mechanical bowel obstruction. No colonic wall thickening. Appendix is   normal.  PERITONEUM: Small volume intra-abdominal ascites. No free intraperitoneal   air.  VESSELS: Atherosclerotic changes.  RETROPERITONEUM/LYMPH NODES: No lymphadenopathy.  ABDOMINAL WALL: Fat containing bilateral inguinal hernias, right greater   than left. Small fat-containing umbilical hernia. There is diffuse   subcutaneous edema consistent with anasarca.  BONES: Left femoral neck pinning. Status post sternotomy. Degenerative   changes.    IMPRESSION:  1. Bilateral pleural effusions, right greater left. Bilateral lower lobe   atelectatic changes; underlying parenchymal infiltrate/consolidation   cannot be excluded. Interlobular septal thickening.  2. 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   Mild stranding of the perivesical fat, with suspected bladder wall   thickening. Correlate for cystitis.          --- End of Report ---    < from: TTE Echo Limited or F/U (10.11.23 @ 08:58) >    ACC: [id number] EXAM:  ECHO TTE  CON FU [facility] [id number]                          PROCEDURE DATE:  10/11/2023          INTERPRETATION:  TRANSTHORACIC ECHOCARDIOGRAM REPORT        Patient Name:   JANEE ARCEO Patient Location: [facility]  Medical Rec #:  [id number] Accession #:      [id number]  Account #:      [id number]   Height:           68.0 in 172.6 cm  YOB: 1945  Weight:           160.1 lb 72.60 kg  Patient Age:    78 years   BSA:              1.86 m²  Patient Gender: M          BP:    106/72 mmHg      Date of Exam:        10/11/2023 8:58:08 AM  Sonographer:         Lonnie Alves  Referring Physician: JIMBO OLIVIA    Procedure:     Follow up or Limited Echocardiogram.  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        2D AND M-MODE MEASUREMENTS (normal ranges within parentheses):  Left Ventricle:                  Normal   Aorta/Left Atrium:            Normal  IVSd (2D):              1.11 cm (0.7-1.1) Aortic Root (2D):  4.06 cm   (2.4-3.7)  LVPWd (2D):             0.94 cm (0.7-1.1) Left Atrium (2D):  6.01 cm   (1.9-4.0)  LVIDd (2D):             5.67 cm (3.4-5.7)  LVIDs (2D):             3.33 cm  LVFS (2D):             41.3 %   (>25%)  LV EF (2D):              71 %    (>55%)  Relative Wall Thickness  0.33    (<0.42)    SPECTRAL DOPPLER ANALYSIS (where applicable):  Mitral Valve:  MV Max Inder:   1.98 m/s  MV Mean Grad: 8.5 mmHg    Aortic Valve: AoV Max Inder: 1.35 m/s AoV Peak P.3 mmHg AoV Mean PG:   3.8 mmHg    LVOT Vmax: 0.94 m/s LVOT VTI: 0.174 m LVOT Diameter: 2.34 cm    AoV Area, Vmax: 2.99 cm² AoV Area, VTI: 3.94 cm² AoV Area, Vmn: 2.72 cm²  Ao VTI: 0.190  Aortic Insufficiency:  AI Half-time:  337 msec  AI Decel Rate: 2.87 m/s²    Tricuspid Valve and PA/RV Systolic Pressure: TR Max Velocity: 2.88 m/s RA   Pressure: 8 mmHg RVSP/PASP: 41.2 mmHg      PHYSICIAN INTERPRETATION:  Left Ventricle: The left ventricular internal cavity sizeis normal.  Global LV systolic function was normal. Left ventricular ejection   fraction, by visual estimation, is 50 to 55%. Abnormal septal motion   likely secondary to post-operative status.  Right Ventricle: The right ventricular size is moderately enlarged. RV   systolic function is low normal.  Left Atrium: Severely enlarged left atrium.  Right Atrium: Severely enlarged right atrium.  Pericardium: There is no evidence of pericardial effusion.  Mitral Valve: There is a bioprosthetic valve inthe mitral position, the   apparatus appears abnormally thickened and calcified. There is an small   irregular, partially mobile echodensity visualized on the posterior   mitral valve annulus which may be calcification, however cannot rule out   small valvular vegetation of the posterior mitral annulus. There is a   thin filamentous echodensity which appears attached to the mitral valve   apparatus. There is at least mild mitral regurgitation, the mitral   regurgitant jet is not well visualized. Elevated mitral valve mean   gradient    9 mmHg at HR 94 BPM.  Tricuspid Valve: The tricuspid valve is normal in structure.   Moderate-severe tricuspid regurgitation is visualized. Estimated   pulmonary artery systolic pressure is 41.2 mmHg assuming aright atrial   pressure of 8 mmHg, which is consistent with mild pulmonary hypertension.  Aortic Valve: The aortic valve is trileaflet. Moderate aortic valve   regurgitation is seen. Mild aortic valve leaflet calcification. No aortic   valve stenosis.  Pulmonic Valve: The pulmonic valve is normal. Mild pulmonic valve   regurgitation.  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  Pulmonary Artery: The pulmonary artery is moderately dilated.  Venous: The inferior vena cava was normal sized, with respiratory size   variation less than 50%.  Additional Comments: A pacer wire is visualized in the right ventricle.      Summary:   1. Normal global left ventricular systolic function.   2. Left ventricular ejection fraction, by visual estimation, is 50 to   55%.   3. Normal left ventricular internal cavity size.   4. There is mild septal left ventricular hypertrophy.   5. Abnormal septal motion likely secondary to post-operative status.   6. Moderately enlarged right ventricle with low normal right ventricle   systolic function.   7. Severely enlarged left atrium.   8. Severely enlarged right atrium.   9. There is a bioprosthetic valve in the mitral position, the apparatus   appears abnormally thickened and calcified. There is ansmall irregular,   partially mobile echodensity visualized on the posterior mitral valve   annulus which may be calcification, however cannot rule out small   valvular vegetation of the posterior mitral annulus. There is a thin   filamentous echodensity which appears attached to the mitral valve   apparatus. There is at least mild mitral regurgitation, the mitral   regurgitant jet is not well visualized. Elevated mitral valve mean   gradient    9 mmHg at HR 94 BPM.  10. Moderate-severe tricuspid regurgitation.  11. Mild aortic valve leaflet calcification. No aortic valve stenosis.  12. Moderate aortic regurgitation.  13. Mild pulmonic valve regurgitation.  14. 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  15. Moderately dilated pulmonary artery.  16. Estimated pulmonary artery systolic pressure is 41.2 mmHg assuming a   right atrial pressure of 8 mmHg, which is consistent with mild pulmonary   hypertension.  17. There is no evidence of pericardial effusion.  18. Recommend clinical correlation with the above findings.    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 Catheter was promptly removed but we have not been able to control the bacteremia. 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 He would not do well with ohs redo given his comorbidities and poor performance status to begin with.   Plan:  continue antibiotics for now  comfort as goc is most appropriate. This is likely a terminal infection given lack of ability to achieve source control CC: f/u for  high grade sustained mrsa bacteremia  Patient reports nothing, sleeping after dialysis    REVIEW OF SYSTEMS:  All other review of systems negative (Comprehensive ROS)cannot get right now    Antimicrobials Day #  :3 this combination  ceftaroline fosamil IVPB 200 milliGRAM(s) IV Intermittent every 8 hours  ceftaroline fosamil IVPB      DAPTOmycin IVPB 600 milliGRAM(s) IV Intermittent every 48 hours    Other Medications Reviewed    T(F): 98 (10-13-23 @ 12:52), Max: 99.2 (10-12-23 @ 20:00)  HR: 73 (10-13-23 @ 09:52)  BP: 110/50 (10-13-23 @ 12:52)  RR: 16 (10-13-23 @ 12:52)  SpO2: 95% (10-13-23 @ 12:52)  Wt(kg): --    PHYSICAL EXAM:  General:  no acute distress  Eyes:  anicteric, no conjunctival injection, no discharge  Oropharynx: no lesions or injection 	  Neck: supple, without adenopathy  Lungs: clear to auscultation  Heart: regular rate and rhythm; no murmur, rubs or gallops  Abdomen: soft, nondistended, nontender, without mass or organomegaly  Skin: no lesions  Extremities: no clubbing, cyanosis, . arms with  edema  Neurologic: sleeps    LAB RESULTS:                        7.3    15.93 )-----------( 154      ( 13 Oct 2023 06:00 )             22.1     10-    135  |  100  |  48<H>  ----------------------------<  90  4.2   |  27  |  4.10<H>    Ca    8.9      13 Oct 2023 06:00  Phos  3.8     10-12  Mg     1.7     10-12    TPro  6.3  /  Alb  1.9<L>  /  TBili  0.8  /  DBili  x   /  AST  15  /  ALT  7<L>  /  AlkPhos  183<H>  10-13    LIVER FUNCTIONS - ( 13 Oct 2023 06:00 )  Alb: 1.9 g/dL / Pro: 6.3 g/dL / ALK PHOS: 183 U/L / ALT: 7 U/L / AST: 15 U/L / GGT: x           Urinalysis Basic - ( 13 Oct 2023 06:00 )    Color: x / Appearance: x / SG: x / pH: x  Gluc: 90 mg/dL / Ketone: x  / Bili: x / Urobili: x   Blood: x / Protein: x / Nitrite: x   Leuk Esterase: x / RBC: x / WBC x   Sq Epi: x / Non Sq Epi: x / Bacteria: x      MICROBIOLOGY:  RECENT CULTURES:  10-11 @ 05:52 .Blood Blood-Peripheral     Growth in aerobic and anaerobic bottles: Methicillin Resistant  Staphylococcus aureus  See previous culture -[id number]    Growth in aerobic bottle: Gram Positive Cocci in Clusters  Growth in anaerobic bottle: Gram Positive Cocci in Clusters    10-11 @ 05:26 .Blood Blood-Peripheral     Growth in aerobic and anaerobic bottles: Methicillin Resistant  Staphylococcus aureus  See previous culture -[id number]    Growth in aerobic bottle: Gram Positive Cocci in Clusters  Growth in anaerobic bottle: Gram Positive Cocci in Clusters    10-09 @ 07:00 .Blood Blood Methicillin resistant Staphylococcus aureus    Growth in aerobic and anaerobic bottles: Methicillin Resistant  Staphylococcus aureus  See previous culture -[id number]    Growth in aerobic bottle: Gram Positive Cocci in Clusters  Growth in anaerobic bottle: Gram Positive Cocci in Clusters        RADIOLOGY REVIEWED:    < from: CT Abdomen and Pelvis No Cont (10.11.23 @ 10:08) >    ACC: [id number] EXAM:  CT ABDOMEN AND PELVIS   ORDERED BY: SARA HERNANDEZ     ACC: [id number] EXAM:  CT CHEST   ORDERED BY: SARA HERNANDEZ     PROCEDURE DATE:  10/11/2023          INTERPRETATION:  CLINICAL INFORMATION: MRSA bacteremia.    COMPARISON: None.    CONTRAST/COMPLICATIONS:  IV Contrast: NONE  Oral Contrast: NONE  Complications: None reported at time of study completion    PROCEDURE:  CT of the Chest, Abdomen and Pelvis was performed.  Sagittal and coronal reformats were performed.    FINDINGS:  CHEST:  LUNGS AND LARGE AIRWAYS: Patent central airways. Bilateral lower lobe   atelectatic changes; underlying parenchymal infiltrate/consolidation   cannot be excluded. Interlobular septal thickening identified, raising   suspicion for an element of interstitial edema.  PLEURA: Moderate to large right pleural effusion. Small to moderate left   pleural effusion. No evidence for pneumothorax.  VESSELS: Coronary arterial calcifications. Atherosclerotic aortic   calcifications.  HEART: Cardiomegaly. No pericardial effusion. Micra pacemaker. 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  MEDIASTINUM AND VILLA: No lymphadenopathy.  CHEST WALL AND LOWER NECK: Within normal limits.    ABDOMEN AND PELVIS:  LIVER: The liver is normal in size. No focal hepatic masses are   identified.  BILE DUCTS: Normal caliber.  GALLBLADDER: Large gallstone measuring 2.8 cm. No definite gallbladder   wall thickening.  SPLEEN: Within normal limits.  PANCREAS: Within normal limits.  ADRENALS: Within normal limits.  KIDNEYS/URETERS: A horseshoe kidney, with bilateral renal calculi   measuring up to 10 mm. No hydronephrosis. No discrete space-occupying   lesions of the renal parenchyma noted.    BLADDER: Poorly distended; element of bladder wall thickening cannot be   excluded. Mild stranding of the perivesical fat noted; correlate for   cystitis.  REPRODUCTIVE ORGANS: Foci of prostatic calcification noted.    BOWEL: Fecal material scattered throughout the colon. No evidence for   mechanical bowel obstruction. No colonic wall thickening. Appendix is   normal.  PERITONEUM: Small volume intra-abdominal ascites. No free intraperitoneal   air.  VESSELS: Atherosclerotic changes.  RETROPERITONEUM/LYMPH NODES: No lymphadenopathy.  ABDOMINAL WALL: Fat containing bilateral inguinal hernias, right greater   than left. Small fat-containing umbilical hernia. There is diffuse   subcutaneous edema consistent with anasarca.  BONES: Left femoral neck pinning. Status post sternotomy. Degenerative   changes.    IMPRESSION:  1. Bilateral pleural effusions, right greater left. Bilateral lower lobe   atelectatic changes; underlying parenchymal infiltrate/consolidation   cannot be excluded. Interlobular septal thickening.  2. 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   Mild stranding of the perivesical fat, with suspected bladder wall   thickening. Correlate for cystitis.          --- End of Report ---    < from: TTE Echo Limited or F/U (10.11.23 @ 08:58) >    ACC: [id number] EXAM:  ECHO TTE  CON FU [facility] [id number]                          PROCEDURE DATE:  10/11/2023          INTERPRETATION:  TRANSTHORACIC ECHOCARDIOGRAM REPORT        Patient Name:   JANEE ARCEO Patient Location: [facility]  Medical Rec #:  [id number] Accession #:      [id number]  Account #:      [id number]   Height:           68.0 in 172.6 cm  YOB: 1945  Weight:           160.1 lb 72.60 kg  Patient Age:    78 years   BSA:              1.86 m²  Patient Gender: M          BP:    106/72 mmHg      Date of Exam:        10/11/2023 8:58:08 AM  Sonographer:         Lonnie Alves  Referring Physician: JIMBO OLIVIA    Procedure:     Follow up or Limited Echocardiogram.  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        2D AND M-MODE MEASUREMENTS (normal ranges within parentheses):  Left Ventricle:                  Normal   Aorta/Left Atrium:            Normal  IVSd (2D):              1.11 cm (0.7-1.1) Aortic Root (2D):  4.06 cm   (2.4-3.7)  LVPWd (2D):             0.94 cm (0.7-1.1) Left Atrium (2D):  6.01 cm   (1.9-4.0)  LVIDd (2D):             5.67 cm (3.4-5.7)  LVIDs (2D):             3.33 cm  LVFS (2D):             41.3 %   (>25%)  LV EF (2D):              71 %    (>55%)  Relative Wall Thickness  0.33    (<0.42)    SPECTRAL DOPPLER ANALYSIS (where applicable):  Mitral Valve:  MV Max Inder:   1.98 m/s  MV Mean Grad: 8.5 mmHg    Aortic Valve: AoV Max Inder: 1.35 m/s AoV Peak P.3 mmHg AoV Mean PG:   3.8 mmHg    LVOT Vmax: 0.94 m/s LVOT VTI: 0.174 m LVOT Diameter: 2.34 cm    AoV Area, Vmax: 2.99 cm² AoV Area, VTI: 3.94 cm² AoV Area, Vmn: 2.72 cm²  Ao VTI: 0.190  Aortic Insufficiency:  AI Half-time:  337 msec  AI Decel Rate: 2.87 m/s²    Tricuspid Valve and PA/RV Systolic Pressure: TR Max Velocity: 2.88 m/s RA   Pressure: 8 mmHg RVSP/PASP: 41.2 mmHg      PHYSICIAN INTERPRETATION:  Left Ventricle: The left ventricular internal cavity sizeis normal.  Global LV systolic function was normal. Left ventricular ejection   fraction, by visual estimation, is 50 to 55%. Abnormal septal motion   likely secondary to post-operative status.  Right Ventricle: The right ventricular size is moderately enlarged. RV   systolic function is low normal.  Left Atrium: Severely enlarged left atrium.  Right Atrium: Severely enlarged right atrium.  Pericardium: There is no evidence of pericardial effusion.  Mitral Valve: There is a bioprosthetic valve inthe mitral position, the   apparatus appears abnormally thickened and calcified. There is an small   irregular, partially mobile echodensity visualized on the posterior   mitral valve annulus which may be calcification, however cannot rule out   small valvular vegetation of the posterior mitral annulus. There is a   thin filamentous echodensity which appears attached to the mitral valve   apparatus. There is at least mild mitral regurgitation, the mitral   regurgitant jet is not well visualized. Elevated mitral valve mean   gradient    9 mmHg at HR 94 BPM.  Tricuspid Valve: The tricuspid valve is normal in structure.   Moderate-severe tricuspid regurgitation is visualized. Estimated   pulmonary artery systolic pressure is 41.2 mmHg assuming aright atrial   pressure of 8 mmHg, which is consistent with mild pulmonary hypertension.  Aortic Valve: The aortic valve is trileaflet. Moderate aortic valve   regurgitation is seen. Mild aortic valve leaflet calcification. No aortic   valve stenosis.  Pulmonic Valve: The pulmonic valve is normal. Mild pulmonic valve   regurgitation.  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  Pulmonary Artery: The pulmonary artery is moderately dilated.  Venous: The inferior vena cava was normal sized, with respiratory size   variation less than 50%.  Additional Comments: A pacer wire is visualized in the right ventricle.      Summary:   1. Normal global left ventricular systolic function.   2. Left ventricular ejection fraction, by visual estimation, is 50 to   55%.   3. Normal left ventricular internal cavity size.   4. There is mild septal left ventricular hypertrophy.   5. Abnormal septal motion likely secondary to post-operative status.   6. Moderately enlarged right ventricle with low normal right ventricle   systolic function.   7. Severely enlarged left atrium.   8. Severely enlarged right atrium.   9. There is a bioprosthetic valve in the mitral position, the apparatus   appears abnormally thickened and calcified. There is ansmall irregular,   partially mobile echodensity visualized on the posterior mitral valve   annulus which may be calcification, however cannot rule out small   valvular vegetation of the posterior mitral annulus. There is a thin   filamentous echodensity which appears attached to the mitral valve   apparatus. There is at least mild mitral regurgitation, the mitral   regurgitant jet is not well visualized. Elevated mitral valve mean   gradient    9 mmHg at HR 94 BPM.  10. Moderate-severe tricuspid regurgitation.  11. Mild aortic valve leaflet calcification. No aortic valve stenosis.  12. Moderate aortic regurgitation.  13. Mild pulmonic valve regurgitation.  14. 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  15. Moderately dilated pulmonary artery.  16. Estimated pulmonary artery systolic pressure is 41.2 mmHg assuming a   right atrial pressure of 8 mmHg, which is consistent with mild pulmonary   hypertension.  17. There is no evidence of pericardial effusion.  18. Recommend clinical correlation with the above findings.    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 Catheter was promptly removed but we have not been able to control the bacteremia. 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 He would not do well with ohs redo given his comorbidities and poor performance status to begin with.   Plan:  continue antibiotics for now  comfort as goc is most appropriate. This is likely a terminal infection given lack of ability to achieve source control CC: f/u for  high grade sustained mrsa bacteremia  Patient reports nothing, sleeping after dialysis    REVIEW OF SYSTEMS:  All other review of systems negative (Comprehensive ROS)cannot get right now    Antimicrobials Day #  :3 this combination  ceftaroline fosamil IVPB 200 milliGRAM(s) IV Intermittent every 8 hours  ceftaroline fosamil IVPB      DAPTOmycin IVPB 600 milliGRAM(s) IV Intermittent every 48 hours    Other Medications Reviewed    T(F): 98 (10-13-23 @ 12:52), Max: 99.2 (10-12-23 @ 20:00)  HR: 73 (10-13-23 @ 09:52)  BP: 110/50 (10-13-23 @ 12:52)  RR: 16 (10-13-23 @ 12:52)  SpO2: 95% (10-13-23 @ 12:52)  Wt(kg): --    PHYSICAL EXAM:  General:  no acute distress  Eyes:  anicteric, no conjunctival injection, no discharge  Oropharynx: no lesions or injection 	  Neck: supple, without adenopathy  Lungs: clear to auscultation  Heart: regular rate and rhythm; no murmur, rubs or gallops  Abdomen: soft, nondistended, nontender, without mass or organomegaly  Skin: no lesions  Extremities: no clubbing, cyanosis, . arms with  edema  Neurologic: sleeps    LAB RESULTS:                        7.3    15.93 )-----------( 154      ( 13 Oct 2023 06:00 )             22.1     10-    135  |  100  |  48<H>  ----------------------------<  90  4.2   |  27  |  4.10<H>    Ca    8.9      13 Oct 2023 06:00  Phos  3.8     10-12  Mg     1.7     10-12    TPro  6.3  /  Alb  1.9<L>  /  TBili  0.8  /  DBili  x   /  AST  15  /  ALT  7<L>  /  AlkPhos  183<H>  10-13    LIVER FUNCTIONS - ( 13 Oct 2023 06:00 )  Alb: 1.9 g/dL / Pro: 6.3 g/dL / ALK PHOS: 183 U/L / ALT: 7 U/L / AST: 15 U/L / GGT: x           Urinalysis Basic - ( 13 Oct 2023 06:00 )    Color: x / Appearance: x / SG: x / pH: x  Gluc: 90 mg/dL / Ketone: x  / Bili: x / Urobili: x   Blood: x / Protein: x / Nitrite: x   Leuk Esterase: x / RBC: x / WBC x   Sq Epi: x / Non Sq Epi: x / Bacteria: x      MICROBIOLOGY:  RECENT CULTURES:  10-11 @ 05:52 .Blood Blood-Peripheral     Growth in aerobic and anaerobic bottles: Methicillin Resistant  Staphylococcus aureus  See previous culture -[id number]    Growth in aerobic bottle: Gram Positive Cocci in Clusters  Growth in anaerobic bottle: Gram Positive Cocci in Clusters    10-11 @ 05:26 .Blood Blood-Peripheral     Growth in aerobic and anaerobic bottles: Methicillin Resistant  Staphylococcus aureus  See previous culture -[id number]    Growth in aerobic bottle: Gram Positive Cocci in Clusters  Growth in anaerobic bottle: Gram Positive Cocci in Clusters    10-09 @ 07:00 .Blood Blood Methicillin resistant Staphylococcus aureus    Growth in aerobic and anaerobic bottles: Methicillin Resistant  Staphylococcus aureus  See previous culture -[id number]    Growth in aerobic bottle: Gram Positive Cocci in Clusters  Growth in anaerobic bottle: Gram Positive Cocci in Clusters        RADIOLOGY REVIEWED:    < from: CT Abdomen and Pelvis No Cont (10.11.23 @ 10:08) >    ACC: [id number] EXAM:  CT ABDOMEN AND PELVIS   ORDERED BY: SARA HERNANDEZ     ACC: [id number] EXAM:  CT CHEST   ORDERED BY: SARA HERNANDEZ     PROCEDURE DATE:  10/11/2023          INTERPRETATION:  CLINICAL INFORMATION: MRSA bacteremia.    COMPARISON: None.    CONTRAST/COMPLICATIONS:  IV Contrast: NONE  Oral Contrast: NONE  Complications: None reported at time of study completion    PROCEDURE:  CT of the Chest, Abdomen and Pelvis was performed.  Sagittal and coronal reformats were performed.    FINDINGS:  CHEST:  LUNGS AND LARGE AIRWAYS: Patent central airways. Bilateral lower lobe   atelectatic changes; underlying parenchymal infiltrate/consolidation   cannot be excluded. Interlobular septal thickening identified, raising   suspicion for an element of interstitial edema.  PLEURA: Moderate to large right pleural effusion. Small to moderate left   pleural effusion. No evidence for pneumothorax.  VESSELS: Coronary arterial calcifications. Atherosclerotic aortic   calcifications.  HEART: Cardiomegaly. No pericardial effusion. Micra pacemaker. 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  MEDIASTINUM AND VILLA: No lymphadenopathy.  CHEST WALL AND LOWER NECK: Within normal limits.    ABDOMEN AND PELVIS:  LIVER: The liver is normal in size. No focal hepatic masses are   identified.  BILE DUCTS: Normal caliber.  GALLBLADDER: Large gallstone measuring 2.8 cm. No definite gallbladder   wall thickening.  SPLEEN: Within normal limits.  PANCREAS: Within normal limits.  ADRENALS: Within normal limits.  KIDNEYS/URETERS: A horseshoe kidney, with bilateral renal calculi   measuring up to 10 mm. No hydronephrosis. No discrete space-occupying   lesions of the renal parenchyma noted.    BLADDER: Poorly distended; element of bladder wall thickening cannot be   excluded. Mild stranding of the perivesical fat noted; correlate for   cystitis.  REPRODUCTIVE ORGANS: Foci of prostatic calcification noted.    BOWEL: Fecal material scattered throughout the colon. No evidence for   mechanical bowel obstruction. No colonic wall thickening. Appendix is   normal.  PERITONEUM: Small volume intra-abdominal ascites. No free intraperitoneal   air.  VESSELS: Atherosclerotic changes.  RETROPERITONEUM/LYMPH NODES: No lymphadenopathy.  ABDOMINAL WALL: Fat containing bilateral inguinal hernias, right greater   than left. Small fat-containing umbilical hernia. There is diffuse   subcutaneous edema consistent with anasarca.  BONES: Left femoral neck pinning. Status post sternotomy. Degenerative   changes.    IMPRESSION:  1. Bilateral pleural effusions, right greater left. Bilateral lower lobe   atelectatic changes; underlying parenchymal infiltrate/consolidation   cannot be excluded. Interlobular septal thickening.  2. 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   Mild stranding of the perivesical fat, with suspected bladder wall   thickening. Correlate for cystitis.          --- End of Report ---    < from: TTE Echo Limited or F/U (10.11.23 @ 08:58) >    ACC: [id number] EXAM:  ECHO TTE  CON FU [facility] [id number]                          PROCEDURE DATE:  10/11/2023          INTERPRETATION:  TRANSTHORACIC ECHOCARDIOGRAM REPORT        Patient Name:   JANEE ARCEO Patient Location: [facility]  Medical Rec #:  [id number] Accession #:      [id number]  Account #:      [id number]   Height:           68.0 in 172.6 cm  YOB: 1945  Weight:           160.1 lb 72.60 kg  Patient Age:    78 years   BSA:              1.86 m²  Patient Gender: M          BP:    106/72 mmHg      Date of Exam:        10/11/2023 8:58:08 AM  Sonographer:         Lonnie Alves  Referring Physician: JIMBO OLIVIA    Procedure:     Follow up or Limited Echocardiogram.  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        2D AND M-MODE MEASUREMENTS (normal ranges within parentheses):  Left Ventricle:                  Normal   Aorta/Left Atrium:            Normal  IVSd (2D):              1.11 cm (0.7-1.1) Aortic Root (2D):  4.06 cm   (2.4-3.7)  LVPWd (2D):             0.94 cm (0.7-1.1) Left Atrium (2D):  6.01 cm   (1.9-4.0)  LVIDd (2D):             5.67 cm (3.4-5.7)  LVIDs (2D):             3.33 cm  LVFS (2D):             41.3 %   (>25%)  LV EF (2D):              71 %    (>55%)  Relative Wall Thickness  0.33    (<0.42)    SPECTRAL DOPPLER ANALYSIS (where applicable):  Mitral Valve:  MV Max Inder:   1.98 m/s  MV Mean Grad: 8.5 mmHg    Aortic Valve: AoV Max Inder: 1.35 m/s AoV Peak P.3 mmHg AoV Mean PG:   3.8 mmHg    LVOT Vmax: 0.94 m/s LVOT VTI: 0.174 m LVOT Diameter: 2.34 cm    AoV Area, Vmax: 2.99 cm² AoV Area, VTI: 3.94 cm² AoV Area, Vmn: 2.72 cm²  Ao VTI: 0.190  Aortic Insufficiency:  AI Half-time:  337 msec  AI Decel Rate: 2.87 m/s²    Tricuspid Valve and PA/RV Systolic Pressure: TR Max Velocity: 2.88 m/s RA   Pressure: 8 mmHg RVSP/PASP: 41.2 mmHg      PHYSICIAN INTERPRETATION:  Left Ventricle: The left ventricular internal cavity sizeis normal.  Global LV systolic function was normal. Left ventricular ejection   fraction, by visual estimation, is 50 to 55%. Abnormal septal motion   likely secondary to post-operative status.  Right Ventricle: The right ventricular size is moderately enlarged. RV   systolic function is low normal.  Left Atrium: Severely enlarged left atrium.  Right Atrium: Severely enlarged right atrium.  Pericardium: There is no evidence of pericardial effusion.  Mitral Valve: There is a bioprosthetic valve inthe mitral position, the   apparatus appears abnormally thickened and calcified. There is an small   irregular, partially mobile echodensity visualized on the posterior   mitral valve annulus which may be calcification, however cannot rule out   small valvular vegetation of the posterior mitral annulus. There is a   thin filamentous echodensity which appears attached to the mitral valve   apparatus. There is at least mild mitral regurgitation, the mitral   regurgitant jet is not well visualized. Elevated mitral valve mean   gradient    9 mmHg at HR 94 BPM.  Tricuspid Valve: The tricuspid valve is normal in structure.   Moderate-severe tricuspid regurgitation is visualized. Estimated   pulmonary artery systolic pressure is 41.2 mmHg assuming aright atrial   pressure of 8 mmHg, which is consistent with mild pulmonary hypertension.  Aortic Valve: The aortic valve is trileaflet. Moderate aortic valve   regurgitation is seen. Mild aortic valve leaflet calcification. No aortic   valve stenosis.  Pulmonic Valve: The pulmonic valve is normal. Mild pulmonic valve   regurgitation.  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  Pulmonary Artery: The pulmonary artery is moderately dilated.  Venous: The inferior vena cava was normal sized, with respiratory size   variation less than 50%.  Additional Comments: A pacer wire is visualized in the right ventricle.      Summary:   1. Normal global left ventricular systolic function.   2. Left ventricular ejection fraction, by visual estimation, is 50 to   55%.   3. Normal left ventricular internal cavity size.   4. There is mild septal left ventricular hypertrophy.   5. Abnormal septal motion likely secondary to post-operative status.   6. Moderately enlarged right ventricle with low normal right ventricle   systolic function.   7. Severely enlarged left atrium.   8. Severely enlarged right atrium.   9. There is a bioprosthetic valve in the mitral position, the apparatus   appears abnormally thickened and calcified. There is ansmall irregular,   partially mobile echodensity visualized on the posterior mitral valve   annulus which may be calcification, however cannot rule out small   valvular vegetation of the posterior mitral annulus. There is a thin   filamentous echodensity which appears attached to the mitral valve   apparatus. There is at least mild mitral regurgitation, the mitral   regurgitant jet is not well visualized. Elevated mitral valve mean   gradient    9 mmHg at HR 94 BPM.  10. Moderate-severe tricuspid regurgitation.  11. Mild aortic valve leaflet calcification. No aortic valve stenosis.  12. Moderate aortic regurgitation.  13. Mild pulmonic valve regurgitation.  14. 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  15. Moderately dilated pulmonary artery.  16. Estimated pulmonary artery systolic pressure is 41.2 mmHg assuming a   right atrial pressure of 8 mmHg, which is consistent with mild pulmonary   hypertension.  17. There is no evidence of pericardial effusion.  18. Recommend clinical correlation with the above findings.    [id number] Thu aBss MD Electronically signed on   10/11/2023 at 12:37:54 PM    < end of copied text >        < end of copied text >            Assessment:  Elderly man with esrd, mvr, came in with permacath and high grade mrsa bacteremia. Catheter was promptly removed but we have not been able to control the bacteremia. 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 He would not do well with ohs redo given his comorbidities and poor performance status to begin with.   Plan:  continue antibiotics for now  comfort as goc is most appropriate. This is likely a terminal infection given lack of ability to achieve source control

## 2023-10-14 LAB
CULTURE RESULTS: SIGNIFICANT CHANGE UP
SPECIMEN SOURCE: SIGNIFICANT CHANGE UP

## 2023-10-14 PROCEDURE — 99232 SBSQ HOSP IP/OBS MODERATE 35: CPT

## 2023-10-14 RX ADMIN — Medication 1 SUPPOSITORY(S): at 06:00

## 2023-10-14 RX ADMIN — CEFTAROLINE FOSAMIL 50 MILLIGRAM(S): 600 POWDER, FOR SOLUTION INTRAVENOUS at 05:59

## 2023-10-14 RX ADMIN — CHLORHEXIDINE GLUCONATE 1 APPLICATION(S): 213 SOLUTION TOPICAL at 05:59

## 2023-10-14 RX ADMIN — APIXABAN 5 MILLIGRAM(S): 2.5 TABLET, FILM COATED ORAL at 06:00

## 2023-10-14 RX ADMIN — Medication 150 MICROGRAM(S): at 06:00

## 2023-10-14 RX ADMIN — MIDODRINE HYDROCHLORIDE 10 MILLIGRAM(S): 2.5 TABLET ORAL at 05:59

## 2023-10-14 NOTE — PROVIDER CONTACT NOTE (CRITICAL VALUE NOTIFICATION) - PERSON GIVING RESULT:
Melissa QUINTANILLA/[facility] Melissa QUINTANILLA/[facility] Melissa QUINTANILLA/[facility]

## 2023-10-14 NOTE — PROGRESS NOTE ADULT - ASSESSMENT
Patient is a 78M h/o ESRD, Afib on Eliquis, CAD s/p PCI, prosthetic s/p MVR, h/o proctitis, hemorrhoids, hypothyroidism was in ICU for metabolic encephalopathy, septic shock 2/2 MRSA bacteremia s/p tunnel cath removal 10/3, IJ Shiley placed 10/4, downgraded to 3E for further management. Patient continues to be bacteremic and prognosis is poor.    Spoke to HCP Surekha Issa ([phone]) over the phone. She understands that patient's prognosis is poor and does not wish for Liam to "linger on" indefinitely receiving treatment. She agrees to transitioning patient to comfort care being that patient's doctor Dr. Lainez and our medical team agree that continued treatment will not improve patient's prognosis.    #Septic shock 2/2 MRSA bacteremia  #Metabolic encephalopathy  - awake, AOx1  - CTH 10/4 was negative for acute intracranial pathology  - plan to wean off midodrine as tolerated  - maintain O2 sat >94%  - speech and swallow eval: puree with thin liquids  - Aspiration precaution    #MRSA bacteremia  - permacath removed 10/3, shiley placed 10/4  - TTE 10/1/23 EF 45-50%, no vegetations  - WBCs downtrending  - c/w ceftaroline q8  - switched vancomycin to daptomycin by ID, recs appreciated  - BCx 10/6 +MRSA, BCx 10/9 gm positive clusters  - BCx 10/11 MRSA  - concern for endocarditis, but not a candidate for valve replacements  - CT chest, abd/pelvis 10/11: b/l pleural effusions with lower lobe atelectatic changes, parenchymal infiltrate/consolidation, interlobular septal thickening. Renal calculi, horshoe kidney, suspected bladder wall thickening.  - ID: can hold on ERENDIRA as TTE showed probable vegetations on mvr  - Patient continues to be bacteremic despite antibiotic coverage; prognosis is poor.    #ESRD  - HD M/W/F  - had HD 10/13  - Shiley placed in ICU 10/4  - Nephro consult appreciated  - daily weights    Anemia of ESRD/AOCD  -H/H stable  -FOBT negative    Thrombocytopenia (Improved)  -Heme following  -Likely secondary to sepsis      #Afib  - CHADsVASc 5  - rate controlled  - h/o CAD s/p PCI, s/p MVR    #Hypothyroidism  - TSH elevated      #h/o hemorrhoids, proctitis  - c/w hydrocortisone suppository, mesalamine  - c/w bowel regimen    #DVT ppx  - Eliquis 5mg bid    #GI ppx  - c/w pantoprazole 40mg qd    DNR/DNI. Palliative on board.    - d/w Dr. Aleman Patient is a 78M h/o ESRD, Afib on Eliquis, CAD s/p PCI, prosthetic s/p MVR, h/o proctitis, hemorrhoids, hypothyroidism was in ICU for metabolic encephalopathy, septic shock 2/2 MRSA bacteremia s/p tunnel cath removal 10/3, IJ Shiley placed 10/4, downgraded to 3E for further management. Patient continues to be bacteremic and prognosis is poor.    Spoke to HCP Surekha Issa ([phone]) over the phone. She understands that patient's prognosis is poor and does not wish for Liam to "linger on" indefinitely receiving treatment. She agrees to transitioning patient to comfort care being that patient's doctor Dr. Lainez and our medical team agree that continued treatment will not improve patient's prognosis.    #Septic shock 2/2 MRSA bacteremia  #Metabolic encephalopathy  - awake, AOx1  - CTH 10/4 was negative for acute intracranial pathology  - plan to wean off midodrine as tolerated  - maintain O2 sat >94%  - speech and swallow eval: puree with thin liquids  - Aspiration precaution    #MRSA bacteremia  - permacath removed 10/3, shiley placed 10/4  - TTE 10/1/23 EF 45-50%, no vegetations  - WBCs downtrending  - c/w ceftaroline q8  - switched vancomycin to daptomycin by ID, recs appreciated  - BCx 10/6 +MRSA, BCx 10/9 gm positive clusters  - BCx 10/11 MRSA  - concern for endocarditis, but not a candidate for valve replacements  - CT chest, abd/pelvis 10/11: b/l pleural effusions with lower lobe atelectatic changes, parenchymal infiltrate/consolidation, interlobular septal thickening. Renal calculi, horshoe kidney, suspected bladder wall thickening.  - ID: can hold on ERENDIRA as TTE showed probable vegetations on mvr  - Patient continues to be bacteremic despite antibiotic coverage; prognosis is poor.    #ESRD  - HD M/W/F  - had HD 10/13  - Shiley placed in ICU 10/4  - Nephro consult appreciated  - daily weights    Anemia of ESRD/AOCD  -H/H stable  -FOBT negative    Thrombocytopenia (Improved)  -Heme following  -Likely secondary to sepsis      #Afib  - CHADsVASc 5  - rate controlled  - h/o CAD s/p PCI, s/p MVR    #Hypothyroidism  - TSH elevated      #h/o hemorrhoids, proctitis  - c/w hydrocortisone suppository, mesalamine  - c/w bowel regimen    #DVT ppx  - Eliquis 5mg bid    #GI ppx  - c/w pantoprazole 40mg qd    DNR/DNI. Palliative on board.    - d/w Dr. Aleman Patient is a 78M h/o ESRD, Afib on Eliquis, CAD s/p PCI, prosthetic s/p MVR, h/o proctitis, hemorrhoids, hypothyroidism was in ICU for metabolic encephalopathy, septic shock 2/2 MRSA bacteremia s/p tunnel cath removal 10/3, IJ Shiley placed 10/4, downgraded to 3E for further management. Patient continues to be bacteremic and prognosis is poor.    Spoke to HCP Surekha Issa ([phone]) over the phone. She understands that patient's prognosis is poor and does not wish for Liam to "linger on" indefinitely receiving treatment. She agrees to transitioning patient to comfort care being that patient's doctor Dr. Lainez and our medical team agree that continued treatment will not improve patient's prognosis.    #Septic shock 2/2 MRSA bacteremia  #Metabolic encephalopathy  - awake, AOx1  - CTH 10/4 was negative for acute intracranial pathology  - plan to wean off midodrine as tolerated  - maintain O2 sat >94%  - speech and swallow eval: puree with thin liquids  - Aspiration precaution    #MRSA bacteremia  - permacath removed 10/3, shiley placed 10/4  - TTE 10/1/23 EF 45-50%, no vegetations  - WBCs downtrending  - c/w ceftaroline q8  - switched vancomycin to daptomycin by ID, recs appreciated  - BCx 10/6 +MRSA, BCx 10/9 gm positive clusters  - BCx 10/11 MRSA  - concern for endocarditis, but not a candidate for valve replacements  - CT chest, abd/pelvis 10/11: b/l pleural effusions with lower lobe atelectatic changes, parenchymal infiltrate/consolidation, interlobular septal thickening. Renal calculi, horshoe kidney, suspected bladder wall thickening.  - ID: can hold on ERENDIRA as TTE showed probable vegetations on mvr  - Patient continues to be bacteremic despite antibiotic coverage; prognosis is poor.    #ESRD  - HD M/W/F  - had HD 10/13  - Shiley placed in ICU 10/4  - Nephro consult appreciated  - daily weights    Anemia of ESRD/AOCD  -H/H stable  -FOBT negative    Thrombocytopenia (Improved)  -Heme following  -Likely secondary to sepsis      #Afib  - CHADsVASc 5  - rate controlled  - h/o CAD s/p PCI, s/p MVR    #Hypothyroidism  - TSH elevated      #h/o hemorrhoids, proctitis  - c/w hydrocortisone suppository, mesalamine  - c/w bowel regimen    #DVT ppx  - Eliquis 5mg bid    #GI ppx  - c/w pantoprazole 40mg qd    DNR/DNI. Palliative on board.    - d/w Dr. Aleman

## 2023-10-14 NOTE — PROVIDER CONTACT NOTE (CRITICAL VALUE NOTIFICATION) - TEST AND RESULT REPORTED:
bld culture aerobic and non aerobic -MRSA
BC from 10/4, + growth aerobic and anaerobic gram + cocci in clusters
bld culture
blood cultures 9*30  growth in anerobic bottle gram positive cocci in clusters
final result blood clutures 10/4
gram + cocci in clusters, anaerobic and aerobic bottles
blood culture in aerobic and anaerobic MRSA
+Blood cultures, growth in aerobic and anaerobic bottle MRSA, resistance staph
Blood culture from 10/11/23 growth in aerobic bottle: gram positive cocci in clusters
blood cultures from 10/4 with aerobic bottle. Gram positive cocci in clusters
Blood culture Positive for MRSA
Final catheter culture  greater or equal to 15 colonies MRSA
Positive MRSA
Aerobic bottle gram positive cocci in clusters
vancomycin troph 34.8
blood cultures collected on 9/30/23 preliminary results show growth in anaerobic bottle gram positive cocci in cluster

## 2023-10-14 NOTE — PROGRESS NOTE ADULT - ATTENDING COMMENTS
Patient seen and examined at bedside. No acute overnight events.    Vitals reviewed  Physical exam remarkable for Right sided dialysis catheter, edematous extremities    Pt w/ persistent MRSA Bacteremia, w/ repeat blood cultures positive  High concern for Endocarditis, however, performing ERENDIRA will not . Not a candidate for valvular surgery  Pt not tolerating HD sessions as becomes hypotensive  Discussed case w/ Pt's outpatient cardiologist, Dr. Lainez, as well as Palliative team  Dr. Lainez in agreement with [facility] team that pt w/ poor prognosis and should consider stopping HD as pt is not tolerating it  Continue Abx for now, however, will need to discuss w/ pt's partner regarding comfort measures    DNR/DNI as of now Patient seen and examined at bedside. No acute overnight events.    Vitals reviewed  Physical exam remarkable for Right sided dialysis catheter, edematous extremities    Pt w/ persistent MRSA Bacteremia, w/ repeat blood cultures positive  High concern for Endocarditis, however, performing ERENDIRA will not . Not a candidate for valvular surgery  Pt not tolerating HD sessions as becomes hypotensive  Discussed case w/ Pt's outpatient cardiologist, Dr. Lainez, as well as Palliative team  Dr. Lainez in agreement with [facility] team that pt w/ poor prognosis and should consider stopping HD as pt is not tolerating it  Continue Abx for now, however, will need to discuss w/ pt's partner regarding comfort measures    DNR/DNI as of now Patient seen and examined at bedside. No acute overnight events.    Vitals reviewed  Physical exam remarkable for Right sided dialysis catheter, edematous extremities    Pt w/ persistent MRSA Bacteremia, w/ repeat blood cultures positive  High concern for Endocarditis, however, performing ERENDIRA will not . Not a candidate for valvular surgery  Pt not tolerating HD sessions as becomes hypotensive  Discussed case w/ Pt's outpatient cardiologist, Dr. Lainez, as well as Palliative team  Dr. Lainez in agreement with [facility] team that pt w/ poor prognosis and should consider stopping HD as pt is not tolerating it  Continue Abx for now, however, will need to discuss w/ pt's partner regarding comfort measures    DNR/DNI as of now Patient seen and examined at bedside. No acute overnight events.    Vitals reviewed  Physical exam remarkable for Right sided dialysis catheter, edematous extremities    Pt w/ persistent MRSA Bacteremia, w/ repeat blood cultures positive  High concern for Endocarditis, however, performing ERENDIRA will not . Not a candidate for valvular surgery  Pt not tolerating HD sessions as becomes hypotensive  Advance directive discussion w/ pt's HCP today  Pt is comfort measures. Moving forward, no more HD sessions  Discussed case w/ ICU, will remove dialysis catheter

## 2023-10-14 NOTE — PROVIDER CONTACT NOTE (CRITICAL VALUE NOTIFICATION) - BACKGROUND
poor historian
sepsis, positive blood cultures of gram + in anaerobic bottle cocci in cluster.
Patient admitted for sepsis with hypotension secondary to PNA
Patient admitted for sepsis and bacteremia
N.A

## 2023-10-14 NOTE — PROVIDER CONTACT NOTE (CRITICAL VALUE NOTIFICATION) - SITUATION
Blood culture now positive for MRSA
result bld cul
bld culture result aerobic bottle gram positive cocci in cluster
+Blood cultures, growth in aerobic and anaerobic bottle MRSA, resistance staph
gram + cocci in clusters, anaerobic and aerobic bottles
Final results, + MRSA in both aerobic and anaerobic bottles
vancomycin 34.8
growth in anaerobic bottle gram positive cocci in cluster
Lab notified RN that pt. blood culture tested positive for MRSA.

## 2023-10-14 NOTE — PROGRESS NOTE ADULT - SUBJECTIVE AND OBJECTIVE BOX
HPI:  79 yo M pmhx ESRD came to ED complaining of fatigue. Patient noted to be febrile to 102.2 in ED with complains of cough and wheezing. Received 1l NS bolus and noted to be hypotensive to 80's systolic. At bedside patient is oriented to self and place. Complains of some abdominal pain but is unsure why he is in the hospital. Poor historian and unable to fully participate in ROS (30 Sep 2023 22:43)      SUBJECTIVE:   10/14: Patient seen and Examined at bedside. Pt to be made comfort care at this time. Conversation had with HCP Surekha yesterday 10/13, she understands that patient has a poor prognosis with persistent bacteremia. MOLST for to be completed today.       Vital Signs Last 24 Hrs  T(C): 36.4 (14 Oct 2023 04:58), Max: 36.7 (13 Oct 2023 12:52)  T(F): 97.6 (14 Oct 2023 04:58), Max: 98.1 (13 Oct 2023 19:14)  HR: 91 (14 Oct 2023 04:58) (73 - 91)  BP: 106/57 (14 Oct 2023 04:58) (93/60 - 147/68)  BP(mean): --  RR: 18 (14 Oct 2023 04:58) (16 - 18)  SpO2: 93% (14 Oct 2023 04:58) (93% - 95%)    Parameters below as of 14 Oct 2023 04:58  Patient On (Oxygen Delivery Method): room air        I&O's Summary    13 Oct 2023 07:01  -  14 Oct 2023 07:00  --------------------------------------------------------  IN: 800 mL / OUT: 800 mL / NET: 0 mL        CAPILLARY BLOOD GLUCOSE          PHYSICAL EXAM:  GENERAL: NAD  HEENT:  AT/NC, moist mucous membranes  CHEST/LUNG:  CTA b/l, no rales, wheezes, or rhonchi,  normal respiratory effort, no intercostal retractions  HEART:  irregular HR, S1, S2  VASCULAR: rt IJ shiley in place  ABDOMEN:  BS+, soft, nontender, nondistended  MSK/EXTREMITIES: 2+ peripheral pulses, b/l upper ext edema, nontender, edema rt foot  NERVOUS SYSTEM: awake, AOx1      MEDICATIONS:  MEDICATIONS  (STANDING):  apixaban 5 milliGRAM(s) Oral two times a day  aspirin  chewable 81 milliGRAM(s) Oral daily  chlorhexidine 2% Cloths 1 Application(s) Topical <User Schedule>  epoetin val (PROCRIT) Injectable 88869 Unit(s) IV Push <User Schedule>  folic acid 1 milliGRAM(s) Oral daily  hydrocortisone hemorrhoidal Suppository 1 Suppository(s) Rectal two times a day  influenza  Vaccine (HIGH DOSE) 0.7 milliLiter(s) IntraMuscular once  levothyroxine 150 MICROGram(s) Oral daily  mesalamine Suppository 1000 milliGRAM(s) Rectal at bedtime  midodrine 10 milliGRAM(s) Oral every 8 hours  pantoprazole   Suspension 40 milliGRAM(s) Enteral Tube daily  polyethylene glycol 3350 17 Gram(s) Oral daily  senna 2 Tablet(s) Oral at bedtime      LABS: All Labs Reviewed:                        7.3    15.93 )-----------( 154      ( 13 Oct 2023 06:00 )             22.1     10-13    135  |  100  |  48<H>  ----------------------------<  90  4.2   |  27  |  4.10<H>    Ca    8.9      13 Oct 2023 06:00    TPro  6.3  /  Alb  1.9<L>  /  TBili  0.8  /  DBili  x   /  AST  15  /  ALT  7<L>  /  AlkPhos  183<H>  10-13          Blood Culture: 10-11 @ 05:52  Organism --  Gram Stain Blood -- Gram Stain   Growth in aerobic bottle: Gram Positive Cocci in Clusters  Growth in anaerobic bottle: Gram Positive Cocci in Clusters  Specimen Source .Blood Blood-Peripheral  Culture-Blood --    10-11 @ 05:26  Organism --  Gram Stain Blood -- Gram Stain   Growth in aerobic bottle: Gram Positive Cocci in Clusters  Growth in anaerobic bottle: Gram Positive Cocci in Clusters  Specimen Source .Blood Blood-Peripheral  Culture-Blood --      RADIOLOGY/EKG:  < from: Xray Chest 1 View-PORTABLE IMMEDIATE (09.30.23 @ 19:19) >  IMPRESSION: Cardiomegaly. Right lower lobe infiltrate/pneumonia and   associated effusion. Patchy left lower lobe areas of   infiltrate/atelectasis. Additional findings as above    < end of copied text >  < from: CT Head No Cont (10.04.23 @ 11:00) >  IMPRESSION:  No CT evidence of acute intracranial pathology.      < end of copied text >  < from: Xray Chest 1 View- PORTABLE-Urgent (Xray Chest 1 View- PORTABLE-Urgent .) (10.04.23 @ 12:52) >  IMPRESSION: Increasing CHF. Exchange of right jugular lines.    < end of copied text >  < from: Xray Chest 1 View- PORTABLE-Urgent (Xray Chest 1 View- PORTABLE-Urgent .) (10.04.23 @ 12:52) >  IMPRESSION: Increasing CHF. Exchange of right jugular lines.    < end of copied text >  < from: Xray Chest 1 View AP/PA (10.05.23 @ 10:12) >  IMPRESSION: NG tube coils in the mid upper esophagus with tip in the   lower esophagus. Other findings stable.    < end of copied text >  < from: Xray Chest 1 View AP/PA (10.05.23 @ 12:20) >  IMPRESSION: NG tube now has tip down in the stomach.      < end of copied text >  < from: Xray Chest 1 View- PORTABLE-Routine (Xray Chest 1 View- PORTABLE-Routine in AM.) (10.06.23 @ 08:23) >  IMPRESSION:    No significant change.    Moderate right pleural effusion. Cannot exclude underlying infiltrate or   atelectasis.    Cardiomegaly.    Right central line and enteric catheter in satisfactory position.    < end of copied text >  < from: US Duplex Venous Upper Ext Complete, Bilateral (10.10.23 @ 10:09) >  IMPRESSION:  No evidence of deep venous thrombosis in either upper extremity. Please   note:The right internal jugular and right subclavian vein are unable to   be assessed due to the presence of dressings/vascular access devices.    < end of copied text >  < from: US Kidney and Bladder (10.11.23 @ 09:06) >  IMPRESSION:  Very limited evaluation, without gross hydronephrosis or renal calculi.      < end of copied text >  < from: CT Chest No Cont (10.11.23 @ 10:07) >  IMPRESSION:  1. Bilateral pleural effusions, right greater left. Bilateral lower lobe   atelectatic changes; underlying parenchymal infiltrate/consolidation   cannot be excluded. Interlobular septal thickening.  2. 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   Mild stranding of the perivesical fat, with suspected bladder wall   thickening. Correlate for cystitis.    < end of copied text >  < from: CT Abdomen and Pelvis No Cont (10.11.23 @ 10:08) >  IMPRESSION:  1. Bilateral pleural effusions, right greater left. Bilateral lower lobe   atelectatic changes; underlying parenchymal infiltrate/consolidation   cannot be excluded. Interlobular septal thickening.  2. 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   Mild stranding of the perivesical fat, with suspected bladder wall   thickening. Correlate for cystitis.    < end of copied text >   HPI:  77 yo M pmhx ESRD came to ED complaining of fatigue. Patient noted to be febrile to 102.2 in ED with complains of cough and wheezing. Received 1l NS bolus and noted to be hypotensive to 80's systolic. At bedside patient is oriented to self and place. Complains of some abdominal pain but is unsure why he is in the hospital. Poor historian and unable to fully participate in ROS (30 Sep 2023 22:43)      SUBJECTIVE:   10/14: Patient seen and Examined at bedside. Pt to be made comfort care at this time. Conversation had with HCP Surekha yesterday 10/13, she understands that patient has a poor prognosis with persistent bacteremia. MOLST for to be completed today.       Vital Signs Last 24 Hrs  T(C): 36.4 (14 Oct 2023 04:58), Max: 36.7 (13 Oct 2023 12:52)  T(F): 97.6 (14 Oct 2023 04:58), Max: 98.1 (13 Oct 2023 19:14)  HR: 91 (14 Oct 2023 04:58) (73 - 91)  BP: 106/57 (14 Oct 2023 04:58) (93/60 - 147/68)  BP(mean): --  RR: 18 (14 Oct 2023 04:58) (16 - 18)  SpO2: 93% (14 Oct 2023 04:58) (93% - 95%)    Parameters below as of 14 Oct 2023 04:58  Patient On (Oxygen Delivery Method): room air        I&O's Summary    13 Oct 2023 07:01  -  14 Oct 2023 07:00  --------------------------------------------------------  IN: 800 mL / OUT: 800 mL / NET: 0 mL        CAPILLARY BLOOD GLUCOSE          PHYSICAL EXAM:  GENERAL: NAD  HEENT:  AT/NC, moist mucous membranes  CHEST/LUNG:  CTA b/l, no rales, wheezes, or rhonchi,  normal respiratory effort, no intercostal retractions  HEART:  irregular HR, S1, S2  VASCULAR: rt IJ shiley in place  ABDOMEN:  BS+, soft, nontender, nondistended  MSK/EXTREMITIES: 2+ peripheral pulses, b/l upper ext edema, nontender, edema rt foot  NERVOUS SYSTEM: awake, AOx1      MEDICATIONS:  MEDICATIONS  (STANDING):  apixaban 5 milliGRAM(s) Oral two times a day  aspirin  chewable 81 milliGRAM(s) Oral daily  chlorhexidine 2% Cloths 1 Application(s) Topical <User Schedule>  epoetin val (PROCRIT) Injectable 48080 Unit(s) IV Push <User Schedule>  folic acid 1 milliGRAM(s) Oral daily  hydrocortisone hemorrhoidal Suppository 1 Suppository(s) Rectal two times a day  influenza  Vaccine (HIGH DOSE) 0.7 milliLiter(s) IntraMuscular once  levothyroxine 150 MICROGram(s) Oral daily  mesalamine Suppository 1000 milliGRAM(s) Rectal at bedtime  midodrine 10 milliGRAM(s) Oral every 8 hours  pantoprazole   Suspension 40 milliGRAM(s) Enteral Tube daily  polyethylene glycol 3350 17 Gram(s) Oral daily  senna 2 Tablet(s) Oral at bedtime      LABS: All Labs Reviewed:                        7.3    15.93 )-----------( 154      ( 13 Oct 2023 06:00 )             22.1     10-13    135  |  100  |  48<H>  ----------------------------<  90  4.2   |  27  |  4.10<H>    Ca    8.9      13 Oct 2023 06:00    TPro  6.3  /  Alb  1.9<L>  /  TBili  0.8  /  DBili  x   /  AST  15  /  ALT  7<L>  /  AlkPhos  183<H>  10-13          Blood Culture: 10-11 @ 05:52  Organism --  Gram Stain Blood -- Gram Stain   Growth in aerobic bottle: Gram Positive Cocci in Clusters  Growth in anaerobic bottle: Gram Positive Cocci in Clusters  Specimen Source .Blood Blood-Peripheral  Culture-Blood --    10-11 @ 05:26  Organism --  Gram Stain Blood -- Gram Stain   Growth in aerobic bottle: Gram Positive Cocci in Clusters  Growth in anaerobic bottle: Gram Positive Cocci in Clusters  Specimen Source .Blood Blood-Peripheral  Culture-Blood --      RADIOLOGY/EKG:  < from: Xray Chest 1 View-PORTABLE IMMEDIATE (09.30.23 @ 19:19) >  IMPRESSION: Cardiomegaly. Right lower lobe infiltrate/pneumonia and   associated effusion. Patchy left lower lobe areas of   infiltrate/atelectasis. Additional findings as above    < end of copied text >  < from: CT Head No Cont (10.04.23 @ 11:00) >  IMPRESSION:  No CT evidence of acute intracranial pathology.      < end of copied text >  < from: Xray Chest 1 View- PORTABLE-Urgent (Xray Chest 1 View- PORTABLE-Urgent .) (10.04.23 @ 12:52) >  IMPRESSION: Increasing CHF. Exchange of right jugular lines.    < end of copied text >  < from: Xray Chest 1 View- PORTABLE-Urgent (Xray Chest 1 View- PORTABLE-Urgent .) (10.04.23 @ 12:52) >  IMPRESSION: Increasing CHF. Exchange of right jugular lines.    < end of copied text >  < from: Xray Chest 1 View AP/PA (10.05.23 @ 10:12) >  IMPRESSION: NG tube coils in the mid upper esophagus with tip in the   lower esophagus. Other findings stable.    < end of copied text >  < from: Xray Chest 1 View AP/PA (10.05.23 @ 12:20) >  IMPRESSION: NG tube now has tip down in the stomach.      < end of copied text >  < from: Xray Chest 1 View- PORTABLE-Routine (Xray Chest 1 View- PORTABLE-Routine in AM.) (10.06.23 @ 08:23) >  IMPRESSION:    No significant change.    Moderate right pleural effusion. Cannot exclude underlying infiltrate or   atelectasis.    Cardiomegaly.    Right central line and enteric catheter in satisfactory position.    < end of copied text >  < from: US Duplex Venous Upper Ext Complete, Bilateral (10.10.23 @ 10:09) >  IMPRESSION:  No evidence of deep venous thrombosis in either upper extremity. Please   note:The right internal jugular and right subclavian vein are unable to   be assessed due to the presence of dressings/vascular access devices.    < end of copied text >  < from: US Kidney and Bladder (10.11.23 @ 09:06) >  IMPRESSION:  Very limited evaluation, without gross hydronephrosis or renal calculi.      < end of copied text >  < from: CT Chest No Cont (10.11.23 @ 10:07) >  IMPRESSION:  1. Bilateral pleural effusions, right greater left. Bilateral lower lobe   atelectatic changes; underlying parenchymal infiltrate/consolidation   cannot be excluded. Interlobular septal thickening.  2. 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   Mild stranding of the perivesical fat, with suspected bladder wall   thickening. Correlate for cystitis.    < end of copied text >  < from: CT Abdomen and Pelvis No Cont (10.11.23 @ 10:08) >  IMPRESSION:  1. Bilateral pleural effusions, right greater left. Bilateral lower lobe   atelectatic changes; underlying parenchymal infiltrate/consolidation   cannot be excluded. Interlobular septal thickening.  2. 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   Mild stranding of the perivesical fat, with suspected bladder wall   thickening. Correlate for cystitis.    < end of copied text >   HPI:  77 yo M pmhx ESRD came to ED complaining of fatigue. Patient noted to be febrile to 102.2 in ED with complains of cough and wheezing. Received 1l NS bolus and noted to be hypotensive to 80's systolic. At bedside patient is oriented to self and place. Complains of some abdominal pain but is unsure why he is in the hospital. Poor historian and unable to fully participate in ROS (30 Sep 2023 22:43)      SUBJECTIVE:   10/14: Patient seen and Examined at bedside. Pt to be made comfort care at this time. Conversation had with HCP Surekha yesterday 10/13, she understands that patient has a poor prognosis with persistent bacteremia. MOLST for to be completed today.       Vital Signs Last 24 Hrs  T(C): 36.4 (14 Oct 2023 04:58), Max: 36.7 (13 Oct 2023 12:52)  T(F): 97.6 (14 Oct 2023 04:58), Max: 98.1 (13 Oct 2023 19:14)  HR: 91 (14 Oct 2023 04:58) (73 - 91)  BP: 106/57 (14 Oct 2023 04:58) (93/60 - 147/68)  BP(mean): --  RR: 18 (14 Oct 2023 04:58) (16 - 18)  SpO2: 93% (14 Oct 2023 04:58) (93% - 95%)    Parameters below as of 14 Oct 2023 04:58  Patient On (Oxygen Delivery Method): room air        I&O's Summary    13 Oct 2023 07:01  -  14 Oct 2023 07:00  --------------------------------------------------------  IN: 800 mL / OUT: 800 mL / NET: 0 mL        CAPILLARY BLOOD GLUCOSE          PHYSICAL EXAM:  GENERAL: NAD  HEENT:  AT/NC, moist mucous membranes  CHEST/LUNG:  CTA b/l, no rales, wheezes, or rhonchi,  normal respiratory effort, no intercostal retractions  HEART:  irregular HR, S1, S2  VASCULAR: rt IJ shiley in place  ABDOMEN:  BS+, soft, nontender, nondistended  MSK/EXTREMITIES: 2+ peripheral pulses, b/l upper ext edema, nontender, edema rt foot  NERVOUS SYSTEM: awake, AOx1      MEDICATIONS:  MEDICATIONS  (STANDING):  apixaban 5 milliGRAM(s) Oral two times a day  aspirin  chewable 81 milliGRAM(s) Oral daily  chlorhexidine 2% Cloths 1 Application(s) Topical <User Schedule>  epoetin val (PROCRIT) Injectable 94193 Unit(s) IV Push <User Schedule>  folic acid 1 milliGRAM(s) Oral daily  hydrocortisone hemorrhoidal Suppository 1 Suppository(s) Rectal two times a day  influenza  Vaccine (HIGH DOSE) 0.7 milliLiter(s) IntraMuscular once  levothyroxine 150 MICROGram(s) Oral daily  mesalamine Suppository 1000 milliGRAM(s) Rectal at bedtime  midodrine 10 milliGRAM(s) Oral every 8 hours  pantoprazole   Suspension 40 milliGRAM(s) Enteral Tube daily  polyethylene glycol 3350 17 Gram(s) Oral daily  senna 2 Tablet(s) Oral at bedtime      LABS: All Labs Reviewed:                        7.3    15.93 )-----------( 154      ( 13 Oct 2023 06:00 )             22.1     10-13    135  |  100  |  48<H>  ----------------------------<  90  4.2   |  27  |  4.10<H>    Ca    8.9      13 Oct 2023 06:00    TPro  6.3  /  Alb  1.9<L>  /  TBili  0.8  /  DBili  x   /  AST  15  /  ALT  7<L>  /  AlkPhos  183<H>  10-13          Blood Culture: 10-11 @ 05:52  Organism --  Gram Stain Blood -- Gram Stain   Growth in aerobic bottle: Gram Positive Cocci in Clusters  Growth in anaerobic bottle: Gram Positive Cocci in Clusters  Specimen Source .Blood Blood-Peripheral  Culture-Blood --    10-11 @ 05:26  Organism --  Gram Stain Blood -- Gram Stain   Growth in aerobic bottle: Gram Positive Cocci in Clusters  Growth in anaerobic bottle: Gram Positive Cocci in Clusters  Specimen Source .Blood Blood-Peripheral  Culture-Blood --      RADIOLOGY/EKG:  < from: Xray Chest 1 View-PORTABLE IMMEDIATE (09.30.23 @ 19:19) >  IMPRESSION: Cardiomegaly. Right lower lobe infiltrate/pneumonia and   associated effusion. Patchy left lower lobe areas of   infiltrate/atelectasis. Additional findings as above    < end of copied text >  < from: CT Head No Cont (10.04.23 @ 11:00) >  IMPRESSION:  No CT evidence of acute intracranial pathology.      < end of copied text >  < from: Xray Chest 1 View- PORTABLE-Urgent (Xray Chest 1 View- PORTABLE-Urgent .) (10.04.23 @ 12:52) >  IMPRESSION: Increasing CHF. Exchange of right jugular lines.    < end of copied text >  < from: Xray Chest 1 View- PORTABLE-Urgent (Xray Chest 1 View- PORTABLE-Urgent .) (10.04.23 @ 12:52) >  IMPRESSION: Increasing CHF. Exchange of right jugular lines.    < end of copied text >  < from: Xray Chest 1 View AP/PA (10.05.23 @ 10:12) >  IMPRESSION: NG tube coils in the mid upper esophagus with tip in the   lower esophagus. Other findings stable.    < end of copied text >  < from: Xray Chest 1 View AP/PA (10.05.23 @ 12:20) >  IMPRESSION: NG tube now has tip down in the stomach.      < end of copied text >  < from: Xray Chest 1 View- PORTABLE-Routine (Xray Chest 1 View- PORTABLE-Routine in AM.) (10.06.23 @ 08:23) >  IMPRESSION:    No significant change.    Moderate right pleural effusion. Cannot exclude underlying infiltrate or   atelectasis.    Cardiomegaly.    Right central line and enteric catheter in satisfactory position.    < end of copied text >  < from: US Duplex Venous Upper Ext Complete, Bilateral (10.10.23 @ 10:09) >  IMPRESSION:  No evidence of deep venous thrombosis in either upper extremity. Please   note:The right internal jugular and right subclavian vein are unable to   be assessed due to the presence of dressings/vascular access devices.    < end of copied text >  < from: US Kidney and Bladder (10.11.23 @ 09:06) >  IMPRESSION:  Very limited evaluation, without gross hydronephrosis or renal calculi.      < end of copied text >  < from: CT Chest No Cont (10.11.23 @ 10:07) >  IMPRESSION:  1. Bilateral pleural effusions, right greater left. Bilateral lower lobe   atelectatic changes; underlying parenchymal infiltrate/consolidation   cannot be excluded. Interlobular septal thickening.  2. 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   Mild stranding of the perivesical fat, with suspected bladder wall   thickening. Correlate for cystitis.    < end of copied text >  < from: CT Abdomen and Pelvis No Cont (10.11.23 @ 10:08) >  IMPRESSION:  1. Bilateral pleural effusions, right greater left. Bilateral lower lobe   atelectatic changes; underlying parenchymal infiltrate/consolidation   cannot be excluded. Interlobular septal thickening.  2. 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   Mild stranding of the perivesical fat, with suspected bladder wall   thickening. Correlate for cystitis.    < end of copied text >

## 2023-10-14 NOTE — PROVIDER CONTACT NOTE (CRITICAL VALUE NOTIFICATION) - ACTION/TREATMENT ORDERED:
Pt had been treated with Vancomycin, and Vanco random, and trough being monitored for daily dosing, infected permacath removed in OR
Continue to monitor patient for signs of complication/worsening of current condition.
MD made aware
MARY dewitt pt on ABT
Pt received Vancomycin, cefepime, zosyn, meropenem, and caspofungin IV antibiotics.
continue current treatment
MD will review chart.
Pt pending Vanco level random
MD aware, antibiotics ordered, ID consult in place
on ABT
Patient on comfort care. No new orders.
dublicate order. NP aware and received Vancomycin in am and vancomycin from 0500 lab normal.

## 2023-10-15 PROCEDURE — 99232 SBSQ HOSP IP/OBS MODERATE 35: CPT

## 2023-10-15 RX ADMIN — CHLORHEXIDINE GLUCONATE 1 APPLICATION(S): 213 SOLUTION TOPICAL at 05:17

## 2023-10-15 RX ADMIN — Medication 30 MILLILITER(S): at 12:50

## 2023-10-15 RX ADMIN — Medication 1 MILLIGRAM(S): at 12:50

## 2023-10-15 NOTE — PROGRESS NOTE ADULT - SUBJECTIVE AND OBJECTIVE BOX
Patient is a 78y old  Male who presents with a chief complaint of Sepsis (14 Oct 2023 09:38)      INTERVAL HPI/OVERNIGHT EVENTS: Patient seen and examined at bedside. No overnight events.    MEDICATIONS  (STANDING):  chlorhexidine 2% Cloths 1 Application(s) Topical <User Schedule>  folic acid 1 milliGRAM(s) Oral daily  hydrocortisone hemorrhoidal Suppository 1 Suppository(s) Rectal two times a day  influenza  Vaccine (HIGH DOSE) 0.7 milliLiter(s) IntraMuscular once  polyethylene glycol 3350 17 Gram(s) Oral daily    MEDICATIONS  (PRN):  acetaminophen     Tablet .. 650 milliGRAM(s) Oral every 6 hours PRN Mild Pain (1 - 3)  sodium chloride 0.9% lock flush 10 milliLiter(s) IV Push every 1 hour PRN Pre/post blood products, medications, blood draw, and to maintain line patency      Allergies    levofloxacin (Unknown)  alfuzosin (Unknown)  tamsulosin (Unknown)  Myrbetriq (Unknown)    Intolerances      Vital Signs Last 24 Hrs  T(C): 36.4 (15 Oct 2023 12:52), Max: 36.4 (14 Oct 2023 20:22)  T(F): 97.5 (15 Oct 2023 12:52), Max: 97.6 (14 Oct 2023 20:22)  HR: 87 (15 Oct 2023 12:52) (72 - 87)  BP: 110/60 (15 Oct 2023 12:52) (100/57 - 127/61)  BP(mean): --  RR: 17 (15 Oct 2023 12:52) (16 - 17)  SpO2: 92% (15 Oct 2023 12:52) (92% - 93%)    Parameters below as of 15 Oct 2023 12:52  Patient On (Oxygen Delivery Method): room air      I&O's Summary      PHYSICAL EXAM:  GENERAL: NAD  HEENT:  AT/NC, moist mucous membranes  CHEST/LUNG:  CTA b/l, no rales, wheezes, or rhonchi,  normal respiratory effort, no intercostal retractions  HEART:  irregular HR, S1, S2  VASCULAR: rt IJ shiley in place  ABDOMEN:  BS+, soft, nontender, nondistended  MSK/EXTREMITIES: 2+ peripheral pulses, b/l upper ext edema, nontender, edema rt foot  NERVOUS SYSTEM: awake, AOx1      LABS: Personally reviewed  CBC                        7.3    15.93 )-----------( 154      ( 13 Oct 2023 06:00 )             22.1     CMP  10-13    135  |  100  |  48  ----------------------------<  90  4.2   |  27  |  4.10    Ca    8.9      13 Oct 2023 06:00    TPro  6.3  /  Alb  1.9  /  TBili  0.8  /  DBili  x   /  AST  15  /  ALT  7   /  AlkPhos  183  10-13                                      Urinalysis Basic - ( 13 Oct 2023 06:00 )    Color: x / Appearance: x / SG: x / pH: x  Gluc: 90 mg/dL / Ketone: x  / Bili: x / Urobili: x   Blood: x / Protein: x / Nitrite: x   Leuk Esterase: x / RBC: x / WBC x   Sq Epi: x / Non Sq Epi: x / Bacteria: x        Culture - Blood (collected 11 Oct 2023 05:52)  Source: .Blood Blood-Peripheral  Gram Stain (12 Oct 2023 09:49):    Growth in aerobic bottle: Gram Positive Cocci in Clusters    Growth in anaerobic bottle: Gram Positive Cocci in Clusters  Final Report (13 Oct 2023 06:31):    Growth in aerobic and anaerobic bottles: Methicillin Resistant    Staphylococcus aureus    See previous culture [id number]    Culture - Blood (collected 11 Oct 2023 05:26)  Source: .Blood Blood-Peripheral  Gram Stain (13 Oct 2023 03:20):    Growth in aerobic bottle: Gram Positive Cocci in Clusters    Growth in anaerobic bottle: Gram Positive Cocci in Clusters  Final Report (14 Oct 2023 14:12):    Growth in aerobic and anaerobic bottles: Methicillin Resistant    Staphylococcus aureus    See previous culture [id number]    Culture - Blood (collected 09 Oct 2023 07:00)  Source: .Blood Blood  Gram Stain (10 Oct 2023 09:30):    Growth in aerobic bottle: Gram Positive Cocci in Clusters    Growth in anaerobic bottle: Gram Positive Cocci in Clusters  Final Report (11 Oct 2023 14:50):    Growth in aerobic and anaerobic bottles: Methicillin Resistant    Staphylococcus aureus  Organism: Methicillin resistant Staphylococcus aureus (11 Oct 2023 14:50)  Organism: Methicillin resistant Staphylococcus aureus (11 Oct 2023 14:50)      Method Type: LEWIS      -  Ampicillin/Sulbactam: R <=8/4      -  Cefazolin: R >16      -  Clindamycin: R >4      -  Daptomycin: S 1      -  Erythromycin: R >4      -  Gentamicin: S <=1 Should not be used as monotherapy      -  Linezolid: S 2      -  Oxacillin: R >2      -  Penicillin: R >8      -  Rifampin: S <=1 Should not be used as monotherapy      -  Tetracycline: S <=1      -  Trimethoprim/Sulfamethoxazole: S <=0.5/9.5      -  Vancomycin: S 2    Culture - Blood (collected 09 Oct 2023 07:00)  Source: .Blood Blood  Gram Stain (10 Oct 2023 06:14):    Growth in aerobic bottle: Gram Positive Cocci in Clusters    Growth in anaerobic bottle: Gram Positive Cocci in Clusters  Final Report (11 Oct 2023 12:08):    Growth in aerobic and anaerobic bottles: Methicillin Resistant    Staphylococcus aureus    See previous culture [address] [place] [id number]            Culture - Blood (collected 10-11-23 @ 05:52)  Source: .Blood Blood-Peripheral  Gram Stain (10-12-23 @ 09:49):    Growth in aerobic bottle: Gram Positive Cocci in Clusters    Growth in anaerobic bottle: Gram Positive Cocci in Clusters  Final Report (10-13-23 @ 06:31):    Growth in aerobic and anaerobic bottles: Methicillin Resistant    Staphylococcus aureus    See previous culture [address] [place] [id number]    Culture - Blood (collected 10-11-23 @ 05:26)  Source: .Blood Blood-Peripheral  Gram Stain (10-13-23 @ 03:20):    Growth in aerobic bottle: Gram Positive Cocci in Clusters    Growth in anaerobic bottle: Gram Positive Cocci in Clusters  Final Report (10-14-23 @ 14:12):    Growth in aerobic and anaerobic bottles: Methicillin Resistant    Staphylococcus aureus    See previous culture [id number]    Culture - Blood (collected 10-09-23 @ 07:00)  Source: .Blood Blood  Gram Stain (10-10-23 @ 09:30):    Growth in aerobic bottle: Gram Positive Cocci in Clusters    Growth in anaerobic bottle: Gram Positive Cocci in Clusters  Final Report (10-11-23 @ 14:50):    Growth in aerobic and anaerobic bottles: Methicillin Resistant    Staphylococcus aureus  Organism: Methicillin resistant Staphylococcus aureus (10-11-23 @ 14:50)  Organism: Methicillin resistant Staphylococcus aureus (10-11-23 @ 14:50)      Method Type: LEWIS      -  Ampicillin/Sulbactam: R <=8/4      -  Cefazolin: R >16      -  Clindamycin: R >4      -  Daptomycin: S 1      -  Erythromycin: R >4      -  Gentamicin: S <=1 Should not be used as monotherapy      -  Linezolid: S 2      -  Oxacillin: R >2      -  Penicillin: R >8      -  Rifampin: S <=1 Should not be used as monotherapy      -  Tetracycline: S <=1      -  Trimethoprim/Sulfamethoxazole: S <=0.5/9.5      -  Vancomycin: S 2    Culture - Blood (collected 10-09-23 @ 07:00)  Source: .Blood Blood  Gram Stain (10-10-23 @ 06:14):    Growth in aerobic bottle: Gram Positive Cocci in Clusters    Growth in anaerobic bottle: Gram Positive Cocci in Clusters  Final Report (10-11-23 @ 12:08):    Growth in aerobic and anaerobic bottles: Methicillin Resistant    Staphylococcus aureus    See previous culture [id number]        RADIOLOGY & ADDITIONAL TESTS: Personally reviewed.     Consultant(s) Notes Reviewed:  [x] YES  [ ] NO   Discussed with TRACEY/PEPPER, RN     Patient is a 78y old  Male who presents with a chief complaint of Sepsis (14 Oct 2023 09:38)      INTERVAL HPI/OVERNIGHT EVENTS: Patient seen and examined at bedside. No overnight events.    MEDICATIONS  (STANDING):  chlorhexidine 2% Cloths 1 Application(s) Topical <User Schedule>  folic acid 1 milliGRAM(s) Oral daily  hydrocortisone hemorrhoidal Suppository 1 Suppository(s) Rectal two times a day  influenza  Vaccine (HIGH DOSE) 0.7 milliLiter(s) IntraMuscular once  polyethylene glycol 3350 17 Gram(s) Oral daily    MEDICATIONS  (PRN):  acetaminophen     Tablet .. 650 milliGRAM(s) Oral every 6 hours PRN Mild Pain (1 - 3)  sodium chloride 0.9% lock flush 10 milliLiter(s) IV Push every 1 hour PRN Pre/post blood products, medications, blood draw, and to maintain line patency      Allergies    levofloxacin (Unknown)  alfuzosin (Unknown)  tamsulosin (Unknown)  Myrbetriq (Unknown)    Intolerances      Vital Signs Last 24 Hrs  T(C): 36.4 (15 Oct 2023 12:52), Max: 36.4 (14 Oct 2023 20:22)  T(F): 97.5 (15 Oct 2023 12:52), Max: 97.6 (14 Oct 2023 20:22)  HR: 87 (15 Oct 2023 12:52) (72 - 87)  BP: 110/60 (15 Oct 2023 12:52) (100/57 - 127/61)  BP(mean): --  RR: 17 (15 Oct 2023 12:52) (16 - 17)  SpO2: 92% (15 Oct 2023 12:52) (92% - 93%)    Parameters below as of 15 Oct 2023 12:52  Patient On (Oxygen Delivery Method): room air      I&O's Summary      PHYSICAL EXAM:  GENERAL: NAD  HEENT:  AT/NC, moist mucous membranes  CHEST/LUNG:  CTA b/l, no rales, wheezes, or rhonchi,  normal respiratory effort, no intercostal retractions  HEART:  irregular HR, S1, S2  VASCULAR: rt IJ shiley in place  ABDOMEN:  BS+, soft, nontender, nondistended  MSK/EXTREMITIES: 2+ peripheral pulses, b/l upper ext edema, nontender, edema rt foot  NERVOUS SYSTEM: awake, AOx1      LABS: Personally reviewed  CBC                        7.3    15.93 )-----------( 154      ( 13 Oct 2023 06:00 )             22.1     CMP  10-13    135  |  100  |  48  ----------------------------<  90  4.2   |  27  |  4.10    Ca    8.9      13 Oct 2023 06:00    TPro  6.3  /  Alb  1.9  /  TBili  0.8  /  DBili  x   /  AST  15  /  ALT  7   /  AlkPhos  183  10-13                                      Urinalysis Basic - ( 13 Oct 2023 06:00 )    Color: x / Appearance: x / SG: x / pH: x  Gluc: 90 mg/dL / Ketone: x  / Bili: x / Urobili: x   Blood: x / Protein: x / Nitrite: x   Leuk Esterase: x / RBC: x / WBC x   Sq Epi: x / Non Sq Epi: x / Bacteria: x        Culture - Blood (collected 11 Oct 2023 05:52)  Source: .Blood Blood-Peripheral  Gram Stain (12 Oct 2023 09:49):    Growth in aerobic bottle: Gram Positive Cocci in Clusters    Growth in anaerobic bottle: Gram Positive Cocci in Clusters  Final Report (13 Oct 2023 06:31):    Growth in aerobic and anaerobic bottles: Methicillin Resistant    Staphylococcus aureus    See previous culture [id number]    Culture - Blood (collected 11 Oct 2023 05:26)  Source: .Blood Blood-Peripheral  Gram Stain (13 Oct 2023 03:20):    Growth in aerobic bottle: Gram Positive Cocci in Clusters    Growth in anaerobic bottle: Gram Positive Cocci in Clusters  Final Report (14 Oct 2023 14:12):    Growth in aerobic and anaerobic bottles: Methicillin Resistant    Staphylococcus aureus    See previous culture [id number]    Culture - Blood (collected 09 Oct 2023 07:00)  Source: .Blood Blood  Gram Stain (10 Oct 2023 09:30):    Growth in aerobic bottle: Gram Positive Cocci in Clusters    Growth in anaerobic bottle: Gram Positive Cocci in Clusters  Final Report (11 Oct 2023 14:50):    Growth in aerobic and anaerobic bottles: Methicillin Resistant    Staphylococcus aureus  Organism: Methicillin resistant Staphylococcus aureus (11 Oct 2023 14:50)  Organism: Methicillin resistant Staphylococcus aureus (11 Oct 2023 14:50)      Method Type: LEWIS      -  Ampicillin/Sulbactam: R <=8/4      -  Cefazolin: R >16      -  Clindamycin: R >4      -  Daptomycin: S 1      -  Erythromycin: R >4      -  Gentamicin: S <=1 Should not be used as monotherapy      -  Linezolid: S 2      -  Oxacillin: R >2      -  Penicillin: R >8      -  Rifampin: S <=1 Should not be used as monotherapy      -  Tetracycline: S <=1      -  Trimethoprim/Sulfamethoxazole: S <=0.5/9.5      -  Vancomycin: S 2    Culture - Blood (collected 09 Oct 2023 07:00)  Source: .Blood Blood  Gram Stain (10 Oct 2023 06:14):    Growth in aerobic bottle: Gram Positive Cocci in Clusters    Growth in anaerobic bottle: Gram Positive Cocci in Clusters  Final Report (11 Oct 2023 12:08):    Growth in aerobic and anaerobic bottles: Methicillin Resistant    Staphylococcus aureus    See previous culture [address] [place] [id number]            Culture - Blood (collected 10-11-23 @ 05:52)  Source: .Blood Blood-Peripheral  Gram Stain (10-12-23 @ 09:49):    Growth in aerobic bottle: Gram Positive Cocci in Clusters    Growth in anaerobic bottle: Gram Positive Cocci in Clusters  Final Report (10-13-23 @ 06:31):    Growth in aerobic and anaerobic bottles: Methicillin Resistant    Staphylococcus aureus    See previous culture [address] [place] [id number]    Culture - Blood (collected 10-11-23 @ 05:26)  Source: .Blood Blood-Peripheral  Gram Stain (10-13-23 @ 03:20):    Growth in aerobic bottle: Gram Positive Cocci in Clusters    Growth in anaerobic bottle: Gram Positive Cocci in Clusters  Final Report (10-14-23 @ 14:12):    Growth in aerobic and anaerobic bottles: Methicillin Resistant    Staphylococcus aureus    See previous culture [id number]    Culture - Blood (collected 10-09-23 @ 07:00)  Source: .Blood Blood  Gram Stain (10-10-23 @ 09:30):    Growth in aerobic bottle: Gram Positive Cocci in Clusters    Growth in anaerobic bottle: Gram Positive Cocci in Clusters  Final Report (10-11-23 @ 14:50):    Growth in aerobic and anaerobic bottles: Methicillin Resistant    Staphylococcus aureus  Organism: Methicillin resistant Staphylococcus aureus (10-11-23 @ 14:50)  Organism: Methicillin resistant Staphylococcus aureus (10-11-23 @ 14:50)      Method Type: LEWIS      -  Ampicillin/Sulbactam: R <=8/4      -  Cefazolin: R >16      -  Clindamycin: R >4      -  Daptomycin: S 1      -  Erythromycin: R >4      -  Gentamicin: S <=1 Should not be used as monotherapy      -  Linezolid: S 2      -  Oxacillin: R >2      -  Penicillin: R >8      -  Rifampin: S <=1 Should not be used as monotherapy      -  Tetracycline: S <=1      -  Trimethoprim/Sulfamethoxazole: S <=0.5/9.5      -  Vancomycin: S 2    Culture - Blood (collected 10-09-23 @ 07:00)  Source: .Blood Blood  Gram Stain (10-10-23 @ 06:14):    Growth in aerobic bottle: Gram Positive Cocci in Clusters    Growth in anaerobic bottle: Gram Positive Cocci in Clusters  Final Report (10-11-23 @ 12:08):    Growth in aerobic and anaerobic bottles: Methicillin Resistant    Staphylococcus aureus    See previous culture [id number]        RADIOLOGY & ADDITIONAL TESTS: Personally reviewed.     Consultant(s) Notes Reviewed:  [x] YES  [ ] NO   Discussed with TRACEY/PEPPER, RN     Patient is a 78y old  Male who presents with a chief complaint of Sepsis (14 Oct 2023 09:38)      INTERVAL HPI/OVERNIGHT EVENTS: Patient seen and examined at bedside. No overnight events.    MEDICATIONS  (STANDING):  chlorhexidine 2% Cloths 1 Application(s) Topical <User Schedule>  folic acid 1 milliGRAM(s) Oral daily  hydrocortisone hemorrhoidal Suppository 1 Suppository(s) Rectal two times a day  influenza  Vaccine (HIGH DOSE) 0.7 milliLiter(s) IntraMuscular once  polyethylene glycol 3350 17 Gram(s) Oral daily    MEDICATIONS  (PRN):  acetaminophen     Tablet .. 650 milliGRAM(s) Oral every 6 hours PRN Mild Pain (1 - 3)  sodium chloride 0.9% lock flush 10 milliLiter(s) IV Push every 1 hour PRN Pre/post blood products, medications, blood draw, and to maintain line patency      Allergies    levofloxacin (Unknown)  alfuzosin (Unknown)  tamsulosin (Unknown)  Myrbetriq (Unknown)    Intolerances      Vital Signs Last 24 Hrs  T(C): 36.4 (15 Oct 2023 12:52), Max: 36.4 (14 Oct 2023 20:22)  T(F): 97.5 (15 Oct 2023 12:52), Max: 97.6 (14 Oct 2023 20:22)  HR: 87 (15 Oct 2023 12:52) (72 - 87)  BP: 110/60 (15 Oct 2023 12:52) (100/57 - 127/61)  BP(mean): --  RR: 17 (15 Oct 2023 12:52) (16 - 17)  SpO2: 92% (15 Oct 2023 12:52) (92% - 93%)    Parameters below as of 15 Oct 2023 12:52  Patient On (Oxygen Delivery Method): room air      I&O's Summary      PHYSICAL EXAM:  GENERAL: NAD  HEENT:  AT/NC, moist mucous membranes  CHEST/LUNG:  CTA b/l, no rales, wheezes, or rhonchi,  normal respiratory effort, no intercostal retractions  HEART:  irregular HR, S1, S2  VASCULAR: rt IJ shiley in place  ABDOMEN:  BS+, soft, nontender, nondistended  MSK/EXTREMITIES: 2+ peripheral pulses, b/l upper ext edema, nontender, edema rt foot  NERVOUS SYSTEM: awake, AOx1      LABS: Personally reviewed  CBC                        7.3    15.93 )-----------( 154      ( 13 Oct 2023 06:00 )             22.1     CMP  10-13    135  |  100  |  48  ----------------------------<  90  4.2   |  27  |  4.10    Ca    8.9      13 Oct 2023 06:00    TPro  6.3  /  Alb  1.9  /  TBili  0.8  /  DBili  x   /  AST  15  /  ALT  7   /  AlkPhos  183  10-13                                      Urinalysis Basic - ( 13 Oct 2023 06:00 )    Color: x / Appearance: x / SG: x / pH: x  Gluc: 90 mg/dL / Ketone: x  / Bili: x / Urobili: x   Blood: x / Protein: x / Nitrite: x   Leuk Esterase: x / RBC: x / WBC x   Sq Epi: x / Non Sq Epi: x / Bacteria: x        Culture - Blood (collected 11 Oct 2023 05:52)  Source: .Blood Blood-Peripheral  Gram Stain (12 Oct 2023 09:49):    Growth in aerobic bottle: Gram Positive Cocci in Clusters    Growth in anaerobic bottle: Gram Positive Cocci in Clusters  Final Report (13 Oct 2023 06:31):    Growth in aerobic and anaerobic bottles: Methicillin Resistant    Staphylococcus aureus    See previous culture [id number]    Culture - Blood (collected 11 Oct 2023 05:26)  Source: .Blood Blood-Peripheral  Gram Stain (13 Oct 2023 03:20):    Growth in aerobic bottle: Gram Positive Cocci in Clusters    Growth in anaerobic bottle: Gram Positive Cocci in Clusters  Final Report (14 Oct 2023 14:12):    Growth in aerobic and anaerobic bottles: Methicillin Resistant    Staphylococcus aureus    See previous culture [id number]    Culture - Blood (collected 09 Oct 2023 07:00)  Source: .Blood Blood  Gram Stain (10 Oct 2023 09:30):    Growth in aerobic bottle: Gram Positive Cocci in Clusters    Growth in anaerobic bottle: Gram Positive Cocci in Clusters  Final Report (11 Oct 2023 14:50):    Growth in aerobic and anaerobic bottles: Methicillin Resistant    Staphylococcus aureus  Organism: Methicillin resistant Staphylococcus aureus (11 Oct 2023 14:50)  Organism: Methicillin resistant Staphylococcus aureus (11 Oct 2023 14:50)      Method Type: LEWIS      -  Ampicillin/Sulbactam: R <=8/4      -  Cefazolin: R >16      -  Clindamycin: R >4      -  Daptomycin: S 1      -  Erythromycin: R >4      -  Gentamicin: S <=1 Should not be used as monotherapy      -  Linezolid: S 2      -  Oxacillin: R >2      -  Penicillin: R >8      -  Rifampin: S <=1 Should not be used as monotherapy      -  Tetracycline: S <=1      -  Trimethoprim/Sulfamethoxazole: S <=0.5/9.5      -  Vancomycin: S 2    Culture - Blood (collected 09 Oct 2023 07:00)  Source: .Blood Blood  Gram Stain (10 Oct 2023 06:14):    Growth in aerobic bottle: Gram Positive Cocci in Clusters    Growth in anaerobic bottle: Gram Positive Cocci in Clusters  Final Report (11 Oct 2023 12:08):    Growth in aerobic and anaerobic bottles: Methicillin Resistant    Staphylococcus aureus    See previous culture [address] [place] [id number]            Culture - Blood (collected 10-11-23 @ 05:52)  Source: .Blood Blood-Peripheral  Gram Stain (10-12-23 @ 09:49):    Growth in aerobic bottle: Gram Positive Cocci in Clusters    Growth in anaerobic bottle: Gram Positive Cocci in Clusters  Final Report (10-13-23 @ 06:31):    Growth in aerobic and anaerobic bottles: Methicillin Resistant    Staphylococcus aureus    See previous culture [address] [place] [id number]    Culture - Blood (collected 10-11-23 @ 05:26)  Source: .Blood Blood-Peripheral  Gram Stain (10-13-23 @ 03:20):    Growth in aerobic bottle: Gram Positive Cocci in Clusters    Growth in anaerobic bottle: Gram Positive Cocci in Clusters  Final Report (10-14-23 @ 14:12):    Growth in aerobic and anaerobic bottles: Methicillin Resistant    Staphylococcus aureus    See previous culture [id number]    Culture - Blood (collected 10-09-23 @ 07:00)  Source: .Blood Blood  Gram Stain (10-10-23 @ 09:30):    Growth in aerobic bottle: Gram Positive Cocci in Clusters    Growth in anaerobic bottle: Gram Positive Cocci in Clusters  Final Report (10-11-23 @ 14:50):    Growth in aerobic and anaerobic bottles: Methicillin Resistant    Staphylococcus aureus  Organism: Methicillin resistant Staphylococcus aureus (10-11-23 @ 14:50)  Organism: Methicillin resistant Staphylococcus aureus (10-11-23 @ 14:50)      Method Type: LEWIS      -  Ampicillin/Sulbactam: R <=8/4      -  Cefazolin: R >16      -  Clindamycin: R >4      -  Daptomycin: S 1      -  Erythromycin: R >4      -  Gentamicin: S <=1 Should not be used as monotherapy      -  Linezolid: S 2      -  Oxacillin: R >2      -  Penicillin: R >8      -  Rifampin: S <=1 Should not be used as monotherapy      -  Tetracycline: S <=1      -  Trimethoprim/Sulfamethoxazole: S <=0.5/9.5      -  Vancomycin: S 2    Culture - Blood (collected 10-09-23 @ 07:00)  Source: .Blood Blood  Gram Stain (10-10-23 @ 06:14):    Growth in aerobic bottle: Gram Positive Cocci in Clusters    Growth in anaerobic bottle: Gram Positive Cocci in Clusters  Final Report (10-11-23 @ 12:08):    Growth in aerobic and anaerobic bottles: Methicillin Resistant    Staphylococcus aureus    See previous culture [id number]        RADIOLOGY & ADDITIONAL TESTS: Personally reviewed.     Consultant(s) Notes Reviewed:  [x] YES  [ ] NO   Discussed with TRACEY/PEPPER, RN

## 2023-10-15 NOTE — CHART NOTE - NSCHARTNOTEFT_GEN_A_CORE
RIJ TLC removed per usual fashion. Sutures removed and catheter pulled per usual fashion. Catheter tip intact. Pressure held x 3 min. No bleeding noted. clean dry dressing applied. Tolerated well.

## 2023-10-15 NOTE — PROGRESS NOTE ADULT - ATTENDING COMMENTS
Patient seen and examined at bedside in the presence pt's partner/HCP. No acute overnight events.    Vitals reviewed  Physical exam remarkable for Right sided dialysis catheter, edematous extremities    Pt w/ persistent MRSA Bacteremia, w/ repeat blood cultures positive  ICU team will remove catheter today    [facility]/ [facility]  They would like home Hospice   Hospice referral to be sent     Poor prognosis Patient seen and examined at bedside in the presence pt's partner/HCP. No acute overnight events.    Vitals reviewed  Physical exam remarkable for Right sided dialysis catheter, edematous extremities    Pt w/ persistent MRSA Bacteremia, w/ repeat blood cultures positive  ICU team will remove catheter today    D[facility]/ [facility] at [facility] regarding [facility]  They would like home Hospice   Hospice referral to be sent     Poor prognosis Patient seen and examined at bedside in the presence pt's partner/HCP. No acute overnight events.    Vitals reviewed  Physical exam remarkable for Right sided dialysis catheter, edematous extremities    Pt w/ persistent MRSA Bacteremia, w/ repeat blood cultures positive  ICU team will remove catheter today    [facility]  They would like home Hospice   Hospice referral to be sent     Poor prognosis

## 2023-10-15 NOTE — PROGRESS NOTE ADULT - ASSESSMENT
Patient is a 78M h/o ESRD, Afib on Eliquis, CAD s/p PCI, prosthetic s/p MVR, h/o proctitis, hemorrhoids, hypothyroidism was in ICU for metabolic encephalopathy, septic shock 2/2 MRSA bacteremia s/p tunnel cath removal 10/3, IJ Shiley placed 10/4, downgraded to 3E for further management. Patient continues to be bacteremic and prognosis is poor.    Spoke to HCP Surekha Issa at bedside. She is unable to take care of Liam around the clock by herself, but based on patient's preference would prefer to pursue hospice care at home rather than inpatient.  Patient continues to be on comfort care, pending discussion of hospice evaluation.    #Septic shock 2/2 MRSA bacteremia  #Metabolic encephalopathy  - awake, AOx1  - CTH 10/4 was negative for acute intracranial pathology  - midodrine discontinued  - maintain O2 sat >94%  - speech and swallow eval: puree with thin liquids  - Aspiration precaution    #MRSA bacteremia  - permacath removed 10/3, shiley placed 10/4  - shiley removal 10/15  - TTE 10/1/23 EF 45-50%, no vegetations  - s/p ceftaroline, daptomycin, discontinued as patient is transitioned to comfort care only  - BCx 10/11 MRSA  - concern for endocarditis, but not a candidate for valve replacements  - CT chest, abd/pelvis 10/11: b/l pleural effusions with lower lobe atelectatic changes, parenchymal infiltrate/consolidation, interlobular septal thickening. Renal calculi, horshoe kidney, suspected bladder wall thickening.  - ID: can hold on ERENDIRA as TTE showed probable vegetations on mvr  - Patient continues to be bacteremic despite antibiotic coverage prior to transition to comfort care    #ESRD  - HD M/W/F discontinued  - last HD 10/13  - Shiley placed in ICU 10/4; shiley removal 10/15  - Nephro consult appreciated  - daily weights    Anemia of ESRD/AOCD  -H/H has been stable  -FOBT negative    Thrombocytopenia (Improved)  -Heme following  -Likely secondary to sepsis      #Afib  - CHADsVASc 5  - rate controlled  - h/o CAD s/p PCI, s/p MVR    #Hypothyroidism  - TSH elevated      #h/o hemorrhoids, proctitis  - c/w hydrocortisone suppository, mesalamine  - c/w bowel regimen    #DVT ppx  - Eliquis 5mg bid discontinued    DNR/DNI. Palliative on board.

## 2023-10-15 NOTE — PROGRESS NOTE ADULT - SUBJECTIVE AND OBJECTIVE BOX
[facility] NEPHROLOGY SERVICES, [facility]  NEPHROLOGY AND HYPERTENSION  [address]  [address]  [place] [address]  [phone]    MD MICHAEL ESCOBAR MD YELENA ROSENBERG, MD BINNY KOSHY, MD CHRISTOPHER CAPUTO, MD EDWARD BOVER, MD          Patient events noted  No distress      MEDICATIONS  (STANDING):  chlorhexidine 2% Cloths 1 Application(s) Topical <User Schedule>  folic acid 1 milliGRAM(s) Oral daily  hydrocortisone hemorrhoidal Suppository 1 Suppository(s) Rectal two times a day  influenza  Vaccine (HIGH DOSE) 0.7 milliLiter(s) IntraMuscular once  polyethylene glycol 3350 17 Gram(s) Oral daily    MEDICATIONS  (PRN):  acetaminophen     Tablet .. 650 milliGRAM(s) Oral every 6 hours PRN Mild Pain (1 - 3)  sodium chloride 0.9% lock flush 10 milliLiter(s) IV Push every 1 hour PRN Pre/post blood products, medications, blood draw, and to maintain line patency      10-15-23 @ 07:01  -  10-15-23 @ 22:08  --------------------------------------------------------  IN: 200 mL / OUT: 200 mL / NET: 0 mL      PHYSICAL EXAM:      T(C): 36.4 (10-15-23 @ 20:08), Max: 36.4 (10-15-23 @ 12:52)  HR: 85 (10-15-23 @ 20:08) (72 - 87)  BP: 128/70 (10-15-23 @ 20:08) (104/62 - 128/70)  RR: 17 (10-15-23 @ 20:08) (16 - 17)  SpO2: 93% (10-15-23 @ 20:08) (92% - 93%)  Wt(kg): --  Lungs clear  Heart S1S2  Abd soft NT ND  Extremities:   1-2 edema                          Creatinine Trend: 4.10<--, 3.42<--, 4.68<--, 4.17<--, 5.27<--, 4.80<--      A/P:    ESRD on HD at [facility] MWF via perm cath  Hx Afib, CM, EF 45 - 50%  Has LUE AVF, not yet mature   Adm 9/30/23 w/MRSA bacteremia, persistent   Abx per ID  Perm cath d/c-d 10/2/23  Cath cx pos for MRSA  HD MWF via temp IJ HD cath (placed 10/4/23)  No fluid removed w/HD today due to hypotension  Endocarditis is suspected, however pt is determined not to be a surgical candidate per Cardiology  Overall options are limited and prognosis is poor    Plan    Holding HD as per family request  Palliative f/u appr  Comfort based approach is reasonable  Discussed with HCP at bedside      Ernesto Saab MD [facility] NEPHROLOGY SERVICES, [facility]  NEPHROLOGY AND HYPERTENSION  [address]  [address]  [place] [address]  [phone]    MD MICHAEL ESCOBAR MD YELENA ROSENBERG, MD BINNY KOSHY, MD CHRISTOPHER CAPUTO, MD EDWARD BOVER, MD          Patient events noted  No distress      MEDICATIONS  (STANDING):  chlorhexidine 2% Cloths 1 Application(s) Topical <User Schedule>  folic acid 1 milliGRAM(s) Oral daily  hydrocortisone hemorrhoidal Suppository 1 Suppository(s) Rectal two times a day  influenza  Vaccine (HIGH DOSE) 0.7 milliLiter(s) IntraMuscular once  polyethylene glycol 3350 17 Gram(s) Oral daily    MEDICATIONS  (PRN):  acetaminophen     Tablet .. 650 milliGRAM(s) Oral every 6 hours PRN Mild Pain (1 - 3)  sodium chloride 0.9% lock flush 10 milliLiter(s) IV Push every 1 hour PRN Pre/post blood products, medications, blood draw, and to maintain line patency      10-15-23 @ 07:01  -  10-15-23 @ 22:08  --------------------------------------------------------  IN: 200 mL / OUT: 200 mL / NET: 0 mL      PHYSICAL EXAM:      T(C): 36.4 (10-15-23 @ 20:08), Max: 36.4 (10-15-23 @ 12:52)  HR: 85 (10-15-23 @ 20:08) (72 - 87)  BP: 128/70 (10-15-23 @ 20:08) (104/62 - 128/70)  RR: 17 (10-15-23 @ 20:08) (16 - 17)  SpO2: 93% (10-15-23 @ 20:08) (92% - 93%)  Wt(kg): --  Lungs clear  Heart S1S2  Abd soft NT ND  Extremities:   1-2 edema                          Creatinine Trend: 4.10<--, 3.42<--, 4.68<--, 4.17<--, 5.27<--, 4.80<--      A/P:    ESRD on HD at [facility] MWF via perm cath  Hx Afib, CM, EF 45 - 50%  Has LUE AVF, not yet mature   Adm 9/30/23 w/MRSA bacteremia, persistent   Abx per ID  Perm cath d/c-d 10/2/23  Cath cx pos for MRSA  HD MWF via temp IJ HD cath (placed 10/4/23)  No fluid removed w/HD today due to hypotension  Endocarditis is suspected, however pt is determined not to be a surgical candidate per Cardiology  Overall options are limited and prognosis is poor    Plan    Holding HD as per family request  Palliative f/u appr  Comfort based approach is reasonable  Discussed with HCP at bedside      Ernesto Saab MD [facility] NEPHROLOGY SERVICES, [facility]  NEPHROLOGY AND HYPERTENSION  [address]  [address]  [place] [address]  [phone]    MD MICHAEL ESCOBAR MD YELENA ROSENBERG, MD BINNY KOSHY, MD CHRISTOPHER CAPUTO, MD EDWARD BOVER, MD          Patient events noted  No distress      MEDICATIONS  (STANDING):  chlorhexidine 2% Cloths 1 Application(s) Topical <User Schedule>  folic acid 1 milliGRAM(s) Oral daily  hydrocortisone hemorrhoidal Suppository 1 Suppository(s) Rectal two times a day  influenza  Vaccine (HIGH DOSE) 0.7 milliLiter(s) IntraMuscular once  polyethylene glycol 3350 17 Gram(s) Oral daily    MEDICATIONS  (PRN):  acetaminophen     Tablet .. 650 milliGRAM(s) Oral every 6 hours PRN Mild Pain (1 - 3)  sodium chloride 0.9% lock flush 10 milliLiter(s) IV Push every 1 hour PRN Pre/post blood products, medications, blood draw, and to maintain line patency      10-15-23 @ 07:01  -  10-15-23 @ 22:08  --------------------------------------------------------  IN: 200 mL / OUT: 200 mL / NET: 0 mL      PHYSICAL EXAM:      T(C): 36.4 (10-15-23 @ 20:08), Max: 36.4 (10-15-23 @ 12:52)  HR: 85 (10-15-23 @ 20:08) (72 - 87)  BP: 128/70 (10-15-23 @ 20:08) (104/62 - 128/70)  RR: 17 (10-15-23 @ 20:08) (16 - 17)  SpO2: 93% (10-15-23 @ 20:08) (92% - 93%)  Wt(kg): --  Lungs clear  Heart S1S2  Abd soft NT ND  Extremities:   1-2 edema                          Creatinine Trend: 4.10<--, 3.42<--, 4.68<--, 4.17<--, 5.27<--, 4.80<--      A/P:    ESRD on HD at [facility] MWF via perm cath  Hx Afib, CM, EF 45 - 50%  Has LUE AVF, not yet mature   Adm 9/30/23 w/MRSA bacteremia, persistent   Abx per ID  Perm cath d/c-d 10/2/23  Cath cx pos for MRSA  HD MWF via temp IJ HD cath (placed 10/4/23)  No fluid removed w/HD today due to hypotension  Endocarditis is suspected, however pt is determined not to be a surgical candidate per Cardiology  Overall options are limited and prognosis is poor    Plan    Holding HD as per family request  Palliative f/u appr  Comfort based approach is reasonable  Discussed with HCP at bedside      Ernesto Saab MD

## 2023-10-16 PROCEDURE — 99232 SBSQ HOSP IP/OBS MODERATE 35: CPT

## 2023-10-16 PROCEDURE — 99232 SBSQ HOSP IP/OBS MODERATE 35: CPT | Mod: GC

## 2023-10-16 RX ADMIN — Medication 1 MILLIGRAM(S): at 12:40

## 2023-10-16 RX ADMIN — Medication 1 SUPPOSITORY(S): at 18:57

## 2023-10-16 RX ADMIN — POLYETHYLENE GLYCOL 3350 17 GRAM(S): 17 POWDER, FOR SOLUTION ORAL at 12:40

## 2023-10-16 RX ADMIN — CHLORHEXIDINE GLUCONATE 1 APPLICATION(S): 213 SOLUTION TOPICAL at 05:18

## 2023-10-16 NOTE — PROGRESS NOTE ADULT - SUBJECTIVE AND OBJECTIVE BOX
Patient is a 78y old  Male who presents with a chief complaint of Sepsis (16 Oct 2023 10:15)      INTERVAL HPI/OVERNIGHT EVENTS: Patient seen and examined at bedside. No new complaints.    MEDICATIONS  (STANDING):  chlorhexidine 2% Cloths 1 Application(s) Topical <User Schedule>  folic acid 1 milliGRAM(s) Oral daily  hydrocortisone hemorrhoidal Suppository 1 Suppository(s) Rectal two times a day  influenza  Vaccine (HIGH DOSE) 0.7 milliLiter(s) IntraMuscular once  polyethylene glycol 3350 17 Gram(s) Oral daily    MEDICATIONS  (PRN):  acetaminophen     Tablet .. 650 milliGRAM(s) Oral every 6 hours PRN Mild Pain (1 - 3)  sodium chloride 0.9% lock flush 10 milliLiter(s) IV Push every 1 hour PRN Pre/post blood products, medications, blood draw, and to maintain line patency      Allergies    levofloxacin (Unknown)  alfuzosin (Unknown)  tamsulosin (Unknown)  Myrbetriq (Unknown)    Intolerances        Vital Signs Last 24 Hrs  T(C): 36.4 (16 Oct 2023 12:34), Max: 36.4 (15 Oct 2023 20:08)  T(F): 97.6 (16 Oct 2023 12:34), Max: 97.6 (15 Oct 2023 20:08)  HR: 86 (16 Oct 2023 12:34) (75 - 86)  BP: 106/52 (16 Oct 2023 12:34) (106/52 - 129/61)  BP(mean): --  RR: 18 (16 Oct 2023 12:34) (17 - 18)  SpO2: 93% (16 Oct 2023 12:34) (93% - 94%)    Parameters below as of 16 Oct 2023 12:34  Patient On (Oxygen Delivery Method): room air      I&O's Summary    15 Oct 2023 07:01  -  16 Oct 2023 07:00  --------------------------------------------------------  IN: 200 mL / OUT: 500 mL / NET: -300 mL    16 Oct 2023 07:01  -  16 Oct 2023 14:00  --------------------------------------------------------  IN: 120 mL / OUT: 0 mL / NET: 120 mL      PHYSICAL EXAM:  GENERAL: NAD  HEENT:  AT/NC, moist mucous membranes  CHEST/LUNG:  CTA b/l, no rales, wheezes, or rhonchi,  normal respiratory effort, no intercostal retractions  HEART:  irregular HR, S1, S2  ABDOMEN:  BS+, soft, nontender, nondistended  MSK/EXTREMITIES: 2+ peripheral pulses, b/l upper ext edema, nontender, edema rt foot  NERVOUS SYSTEM: awake, AOx1      LABS: Personally reviewed  CBC    CMP                                              Culture - Blood (collected 11 Oct 2023 05:52)  Source: .Blood Blood-Peripheral  Gram Stain (12 Oct 2023 09:49):    Growth in aerobic bottle: Gram Positive Cocci in Clusters    Growth in anaerobic bottle: Gram Positive Cocci in Clusters  Final Report (13 Oct 2023 06:31):    Growth in aerobic and anaerobic bottles: Methicillin Resistant    Staphylococcus aureus    See previous culture [id number]    Culture - Blood (collected 11 Oct 2023 05:26)  Source: .Blood Blood-Peripheral  Gram Stain (13 Oct 2023 03:20):    Growth in aerobic bottle: Gram Positive Cocci in Clusters    Growth in anaerobic bottle: Gram Positive Cocci in Clusters  Final Report (14 Oct 2023 14:12):    Growth in aerobic and anaerobic bottles: Methicillin Resistant    Staphylococcus aureus    See previous culture [address] [place] [id number]            Culture - Blood (collected 10-11-23 @ 05:52)  Source: .Blood Blood-Peripheral  Gram Stain (10-12-23 @ 09:49):    Growth in aerobic bottle: Gram Positive Cocci in Clusters    Growth in anaerobic bottle: Gram Positive Cocci in Clusters  Final Report (10-13-23 @ 06:31):    Growth in aerobic and anaerobic bottles: Methicillin Resistant    Staphylococcus aureus    See previous culture [id number]    Culture - Blood (collected 10-11-23 @ 05:26)  Source: .Blood Blood-Peripheral  Gram Stain (10-13-23 @ 03:20):    Growth in aerobic bottle: Gram Positive Cocci in Clusters    Growth in anaerobic bottle: Gram Positive Cocci in Clusters  Final Report (10-14-23 @ 14:12):    Growth in aerobic and anaerobic bottles: Methicillin Resistant    Staphylococcus aureus    See previous culture [id number]        RADIOLOGY & ADDITIONAL TESTS: Personally reviewed.     Consultant(s) Notes Reviewed:  [x] YES  [ ] NO   Discussed with TRACEY/PEPPER, RN     Patient is a 78y old  Male who presents with a chief complaint of Sepsis (16 Oct 2023 10:15)      INTERVAL HPI/OVERNIGHT EVENTS: Patient seen and examined at bedside. No new complaints.    MEDICATIONS  (STANDING):  chlorhexidine 2% Cloths 1 Application(s) Topical <User Schedule>  folic acid 1 milliGRAM(s) Oral daily  hydrocortisone hemorrhoidal Suppository 1 Suppository(s) Rectal two times a day  influenza  Vaccine (HIGH DOSE) 0.7 milliLiter(s) IntraMuscular once  polyethylene glycol 3350 17 Gram(s) Oral daily    MEDICATIONS  (PRN):  acetaminophen     Tablet .. 650 milliGRAM(s) Oral every 6 hours PRN Mild Pain (1 - 3)  sodium chloride 0.9% lock flush 10 milliLiter(s) IV Push every 1 hour PRN Pre/post blood products, medications, blood draw, and to maintain line patency      Allergies    levofloxacin (Unknown)  alfuzosin (Unknown)  tamsulosin (Unknown)  Myrbetriq (Unknown)    Intolerances        Vital Signs Last 24 Hrs  T(C): 36.4 (16 Oct 2023 12:34), Max: 36.4 (15 Oct 2023 20:08)  T(F): 97.6 (16 Oct 2023 12:34), Max: 97.6 (15 Oct 2023 20:08)  HR: 86 (16 Oct 2023 12:34) (75 - 86)  BP: 106/52 (16 Oct 2023 12:34) (106/52 - 129/61)  BP(mean): --  RR: 18 (16 Oct 2023 12:34) (17 - 18)  SpO2: 93% (16 Oct 2023 12:34) (93% - 94%)    Parameters below as of 16 Oct 2023 12:34  Patient On (Oxygen Delivery Method): room air      I&O's Summary    15 Oct 2023 07:01  -  16 Oct 2023 07:00  --------------------------------------------------------  IN: 200 mL / OUT: 500 mL / NET: -300 mL    16 Oct 2023 07:01  -  16 Oct 2023 14:00  --------------------------------------------------------  IN: 120 mL / OUT: 0 mL / NET: 120 mL      PHYSICAL EXAM:  GENERAL: NAD  HEENT:  AT/NC, moist mucous membranes  CHEST/LUNG:  CTA b/l, no rales, wheezes, or rhonchi,  normal respiratory effort, no intercostal retractions  HEART:  irregular HR, S1, S2  ABDOMEN:  BS+, soft, nontender, nondistended  MSK/EXTREMITIES: 2+ peripheral pulses, b/l upper ext edema, nontender, edema rt foot  NERVOUS SYSTEM: awake, AOx1      LABS: Personally reviewed  CBC    CMP                                              Culture - Blood (collected 11 Oct 2023 05:52)  Source: .Blood Blood-Peripheral  Gram Stain (12 Oct 2023 09:49):    Growth in aerobic bottle: Gram Positive Cocci in Clusters    Growth in anaerobic bottle: Gram Positive Cocci in Clusters  Final Report (13 Oct 2023 06:31):    Growth in aerobic and anaerobic bottles: Methicillin Resistant    Staphylococcus aureus    See previous culture [id number]    Culture - Blood (collected 11 Oct 2023 05:26)  Source: .Blood Blood-Peripheral  Gram Stain (13 Oct 2023 03:20):    Growth in aerobic bottle: Gram Positive Cocci in Clusters    Growth in anaerobic bottle: Gram Positive Cocci in Clusters  Final Report (14 Oct 2023 14:12):    Growth in aerobic and anaerobic bottles: Methicillin Resistant    Staphylococcus aureus    See previous culture [address] [place] [id number]            Culture - Blood (collected 10-11-23 @ 05:52)  Source: .Blood Blood-Peripheral  Gram Stain (10-12-23 @ 09:49):    Growth in aerobic bottle: Gram Positive Cocci in Clusters    Growth in anaerobic bottle: Gram Positive Cocci in Clusters  Final Report (10-13-23 @ 06:31):    Growth in aerobic and anaerobic bottles: Methicillin Resistant    Staphylococcus aureus    See previous culture [id number]    Culture - Blood (collected 10-11-23 @ 05:26)  Source: .Blood Blood-Peripheral  Gram Stain (10-13-23 @ 03:20):    Growth in aerobic bottle: Gram Positive Cocci in Clusters    Growth in anaerobic bottle: Gram Positive Cocci in Clusters  Final Report (10-14-23 @ 14:12):    Growth in aerobic and anaerobic bottles: Methicillin Resistant    Staphylococcus aureus    See previous culture [id number]        RADIOLOGY & ADDITIONAL TESTS: Personally reviewed.     Consultant(s) Notes Reviewed:  [x] YES  [ ] NO   Discussed with TRACEY/PEPPER, RN     Patient is a 78y old  Male who presents with a chief complaint of Sepsis (16 Oct 2023 10:15)      INTERVAL HPI/OVERNIGHT EVENTS: Patient seen and examined at bedside. No new complaints.    MEDICATIONS  (STANDING):  chlorhexidine 2% Cloths 1 Application(s) Topical <User Schedule>  folic acid 1 milliGRAM(s) Oral daily  hydrocortisone hemorrhoidal Suppository 1 Suppository(s) Rectal two times a day  influenza  Vaccine (HIGH DOSE) 0.7 milliLiter(s) IntraMuscular once  polyethylene glycol 3350 17 Gram(s) Oral daily    MEDICATIONS  (PRN):  acetaminophen     Tablet .. 650 milliGRAM(s) Oral every 6 hours PRN Mild Pain (1 - 3)  sodium chloride 0.9% lock flush 10 milliLiter(s) IV Push every 1 hour PRN Pre/post blood products, medications, blood draw, and to maintain line patency      Allergies    levofloxacin (Unknown)  alfuzosin (Unknown)  tamsulosin (Unknown)  Myrbetriq (Unknown)    Intolerances        Vital Signs Last 24 Hrs  T(C): 36.4 (16 Oct 2023 12:34), Max: 36.4 (15 Oct 2023 20:08)  T(F): 97.6 (16 Oct 2023 12:34), Max: 97.6 (15 Oct 2023 20:08)  HR: 86 (16 Oct 2023 12:34) (75 - 86)  BP: 106/52 (16 Oct 2023 12:34) (106/52 - 129/61)  BP(mean): --  RR: 18 (16 Oct 2023 12:34) (17 - 18)  SpO2: 93% (16 Oct 2023 12:34) (93% - 94%)    Parameters below as of 16 Oct 2023 12:34  Patient On (Oxygen Delivery Method): room air      I&O's Summary    15 Oct 2023 07:01  -  16 Oct 2023 07:00  --------------------------------------------------------  IN: 200 mL / OUT: 500 mL / NET: -300 mL    16 Oct 2023 07:01  -  16 Oct 2023 14:00  --------------------------------------------------------  IN: 120 mL / OUT: 0 mL / NET: 120 mL      PHYSICAL EXAM:  GENERAL: NAD  HEENT:  AT/NC, moist mucous membranes  CHEST/LUNG:  CTA b/l, no rales, wheezes, or rhonchi,  normal respiratory effort, no intercostal retractions  HEART:  irregular HR, S1, S2  ABDOMEN:  BS+, soft, nontender, nondistended  MSK/EXTREMITIES: 2+ peripheral pulses, b/l upper ext edema, nontender, edema rt foot  NERVOUS SYSTEM: awake, AOx1      LABS: Personally reviewed  CBC    CMP                                              Culture - Blood (collected 11 Oct 2023 05:52)  Source: .Blood Blood-Peripheral  Gram Stain (12 Oct 2023 09:49):    Growth in aerobic bottle: Gram Positive Cocci in Clusters    Growth in anaerobic bottle: Gram Positive Cocci in Clusters  Final Report (13 Oct 2023 06:31):    Growth in aerobic and anaerobic bottles: Methicillin Resistant    Staphylococcus aureus    See previous culture [id number]    Culture - Blood (collected 11 Oct 2023 05:26)  Source: .Blood Blood-Peripheral  Gram Stain (13 Oct 2023 03:20):    Growth in aerobic bottle: Gram Positive Cocci in Clusters    Growth in anaerobic bottle: Gram Positive Cocci in Clusters  Final Report (14 Oct 2023 14:12):    Growth in aerobic and anaerobic bottles: Methicillin Resistant    Staphylococcus aureus    See previous culture [address] [place] [id number]            Culture - Blood (collected 10-11-23 @ 05:52)  Source: .Blood Blood-Peripheral  Gram Stain (10-12-23 @ 09:49):    Growth in aerobic bottle: Gram Positive Cocci in Clusters    Growth in anaerobic bottle: Gram Positive Cocci in Clusters  Final Report (10-13-23 @ 06:31):    Growth in aerobic and anaerobic bottles: Methicillin Resistant    Staphylococcus aureus    See previous culture [id number]    Culture - Blood (collected 10-11-23 @ 05:26)  Source: .Blood Blood-Peripheral  Gram Stain (10-13-23 @ 03:20):    Growth in aerobic bottle: Gram Positive Cocci in Clusters    Growth in anaerobic bottle: Gram Positive Cocci in Clusters  Final Report (10-14-23 @ 14:12):    Growth in aerobic and anaerobic bottles: Methicillin Resistant    Staphylococcus aureus    See previous culture [id number]        RADIOLOGY & ADDITIONAL TESTS: Personally reviewed.     Consultant(s) Notes Reviewed:  [x] YES  [ ] NO   Discussed with TRACEY/PEPPER, RN

## 2023-10-16 NOTE — PROGRESS NOTE ADULT - ASSESSMENT
A/P  77 yo M pmhx ESRD came to ED complaining of fatigue from GG JOEL . Patient  also febrile to 102.2 in ED with complains of cough and wheezing.  Noted to be hypotensive to 80's systolic. Course in ICU , now  downgraded to 3E.   Persistent MRSA bacteremia despite antibiotics and continues w/ AMS           Septic shock 2/2 MRSA bacteremia  Metabolic encephalopathy  - more awake, alert today  , AOx2   - CTH 10/4 was negative for acute intracranial pathology  - speech and swallow eval:  puree with thin liquids  - Aspiration precaution     MRSA bacteremia  - perma cath removed 10/3, shiley placed 10/4- removed over w/e 10/14  - TTE 10/1/23 EF 45-50%, no vegetations  -Repeat TTE 10/11-  shows probable veg on mvr,  bc pos 10/11   - ID following  -  Appreciate Recs            - continue antibiotics             - f/u blood cx    - concern for endocarditis, but not a candidate for valve replacements    - Cardiology following - appreciate their  input and discussion they had w/ pts outpt cardiologist Dr Lainez:    patient's out pt cardiologist  prefers conservative approach and does not feel that ERENDIRA is appropriate for this pt given his overall condition   - pt w/  persistent bacteremia, 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       ESRD  - HD M/W/F;   - Nephro follows -  appreciated        Palliative ;   as a follow up  today , chart reviewed, weekend events noted, I spoke w/ med team this AM, Dr Aleman , plan was set over w/e for pt to go home on home hospice   w/ girlfriend , Surekha as caregiver . Saw pt bedside today , he was more awake, alert, converses, speech slow, word searching , and remains confused about his med situation. Oriented x 2 self, " hospital"   Was not in distress, was calm, no sob, no c/o pain.  Asking to drink fluids.                       A/P  79 yo M pmhx ESRD came to ED complaining of fatigue from GG JOEL . Patient  also febrile to 102.2 in ED with complains of cough and wheezing.  Noted to be hypotensive to 80's systolic. Course in ICU , now  downgraded to 3E.   Persistent MRSA bacteremia despite antibiotics and continues w/ AMS           Septic shock 2/2 MRSA bacteremia  Metabolic encephalopathy  - more awake, alert today  , AOx2   - CTH 10/4 was negative for acute intracranial pathology  - speech and swallow eval:  puree with thin liquids  - Aspiration precaution     MRSA bacteremia  - perma cath removed 10/3, shiley placed 10/4- removed over w/e 10/14  - TTE 10/1/23 EF 45-50%, no vegetations  -Repeat TTE 10/11-  shows probable veg on mvr,  bc pos 10/11   - ID following  -  Appreciate Recs            - continue antibiotics             - f/u blood cx    - concern for endocarditis, but not a candidate for valve replacements    - Cardiology following - appreciate their  input and discussion they had w/ pts outpt cardiologist Dr Lainez:    patient's out pt cardiologist  prefers conservative approach and does not feel that ERENDIRA is appropriate for this pt given his overall condition   - pt w/  persistent bacteremia, 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       ESRD  - HD M/W/F;   - Nephro follows -  appreciated        Palliative ;   as a follow up  today , chart reviewed, weekend events noted, I spoke w/ med team this AM, Dr Aleman , plan was set over w/e for pt to go home on home hospice   w/ girlfriend , Surekha as caregiver . Saw pt bedside today , he was more awake, alert, converses, speech slow, word searching , and remains confused about his med situation. Oriented x 2 self, " hospital"   Was not in distress, was calm, no sob, no c/o pain.  Asking to drink fluids.                       A/P  77 yo M pmhx ESRD came to ED complaining of fatigue from GG JOEL . Patient  also febrile to 102.2 in ED with complains of cough and wheezing.  Noted to be hypotensive to 80's systolic. Course in ICU , now  downgraded to 3E.   Persistent MRSA bacteremia despite antibiotics and continues w/ AMS           Septic shock 2/2 MRSA bacteremia  Metabolic encephalopathy  - more awake, alert today  , AOx2   - CTH 10/4 was negative for acute intracranial pathology  - speech and swallow eval:  puree with thin liquids  - Aspiration precaution     MRSA bacteremia  - perma cath removed 10/3, shiley placed 10/4- removed over w/e 10/14  - TTE 10/1/23 EF 45-50%, no vegetations  -Repeat TTE 10/11-  shows probable veg on mvr,  bc pos 10/11   - ID following  -  Appreciate Recs            - continue antibiotics             - f/u blood cx    - concern for endocarditis, but not a candidate for valve replacements    - Cardiology following - appreciate their  input and discussion they had w/ pts outpt cardiologist Dr Lainez:    patient's out pt cardiologist  prefers conservative approach and does not feel that ERENDIRA is appropriate for this pt given his overall condition   - pt w/  persistent bacteremia, 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       ESRD  - HD M/W/F;   - Nephro follows -  appreciated        Palliative ;   as a follow up  today , chart reviewed, weekend events noted, appreciate consultants input ,   I spoke w/ med team this AM, Dr Aleman , plan was set over the  w/e for pt to go home on home hospice   w/ girlfriend , Surekha as caregiver . Saw pt bedside today , he was more awake, alert, converses, speech slow, word searching , and remains confused about his med situation. Oriented x 2 self, " hospital"   Was not in distress, was calm, no sob, no c/o pain.  Asking to drink fluids.    Agree w/ med team plan for home hospice services.                         A/P  79 yo M pmhx ESRD came to ED complaining of fatigue from GG JOEL . Patient  also febrile to 102.2 in ED with complains of cough and wheezing.  Noted to be hypotensive to 80's systolic. Course in ICU , now  downgraded to 3E.   Persistent MRSA bacteremia despite antibiotics and continues w/ AMS           Septic shock 2/2 MRSA bacteremia  Metabolic encephalopathy  - more awake, alert today  , AOx2   - CTH 10/4 was negative for acute intracranial pathology  - speech and swallow eval:  puree with thin liquids  - Aspiration precaution     MRSA bacteremia  - perma cath removed 10/3, shiley placed 10/4- removed over w/e 10/14  - TTE 10/1/23 EF 45-50%, no vegetations  -Repeat TTE 10/11-  shows probable veg on mvr,  bc pos 10/11   - ID following  -  Appreciate Recs            - continue antibiotics             - f/u blood cx    - concern for endocarditis, but not a candidate for valve replacements    - Cardiology following - appreciate their  input and discussion they had w/ pts outpt cardiologist Dr Lainez:    patient's out pt cardiologist  prefers conservative approach and does not feel that ERENDIRA is appropriate for this pt given his overall condition   - pt w/  persistent bacteremia, 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       ESRD  - HD M/W/F;   - Nephro follows -  appreciated        Palliative ;   as a follow up  today , chart reviewed, weekend events noted, appreciate consultants input ,   I spoke w/ med team this AM, Dr Aleman , plan was set over the  w/e for pt to go home on home hospice   w/ girlfriend , Surekha as caregiver . Saw pt bedside today , he was more awake, alert, converses, speech slow, word searching , and remains confused about his med situation. Oriented x 2 self, " hospital"   Was not in distress, was calm, no sob, no c/o pain.  Asking to drink fluids.    Agree w/ med team plan for home hospice services.                         A/P  77 yo M pmhx ESRD came to ED complaining of fatigue from GG JOEL . Patient  also febrile to 102.2 in ED with complains of cough and wheezing.  Noted to be hypotensive to 80's systolic. Course in ICU , now  downgraded to 3E.   Persistent MRSA bacteremia despite antibiotics and continues w/ AMS           Septic shock 2/2 MRSA bacteremia  Metabolic encephalopathy  - more awake, alert today  , AOx2   - CTH 10/4 was negative for acute intracranial pathology  - speech and swallow eval:  puree with thin liquids  - Aspiration precaution     MRSA bacteremia  - perma cath removed 10/3, shiley placed 10/4- removed over w/e 10/14  - TTE 10/1/23 EF 45-50%, no vegetations  -Repeat TTE 10/11-  shows probable veg on mvr,  bc pos 10/11   - ID following  -  Appreciate Recs            - continue antibiotics             - f/u blood cx    - concern for endocarditis, but not a candidate for valve replacements    - Cardiology following - appreciate their  input and discussion they had w/ pts outpt cardiologist Dr Lainez:    patient's out pt cardiologist  prefers conservative approach and does not feel that ERENDIRA is appropriate for this pt given his overall condition   - pt w/  persistent bacteremia, 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       ESRD  - HD M/W/F;   - Nephro follows -  appreciated        Palliative ;   as a follow up  today , chart reviewed, weekend events noted, appreciate consultants input ,   I spoke w/ med team this AM, Dr Aleman , plan was set over the  w/e for pt to go home on home hospice   w/ girlfriend , Surekha as caregiver . Saw pt bedside today , he was more awake, alert, converses, speech slow, word searching , and remains confused about his med situation. Oriented x 2 self, " hospital"   Was not in distress, was calm, no sob, no c/o pain.  Asking to drink fluids.    Agree w/ med team plan for home hospice services.        Met w/ Surekha bedside today , she confirmed she was talking about home hospice over the w/e w/ medical  team.  She had many questions on what was involved, I explained  what home  hospice would offer , and would  bring to the home,  including, the bed, any oxygen , meds , supplies. Explained it would be covered by Medicare   Explained he would be assigned  to a  team by phone 24/7 , a  nurse would visit once a week , and a  HHA 4 hrs /day M-F only . I strongly recommended she look into  pvt hire HHA to supplement this care .  She stated understanding, and agreed to place consult .                               A/P  79 yo M pmhx ESRD came to ED complaining of fatigue from GG JOEL . Patient  also febrile to 102.2 in ED with complains of cough and wheezing.  Noted to be hypotensive to 80's systolic. Course in ICU , now  downgraded to 3E.   Persistent MRSA bacteremia despite antibiotics and continues w/ AMS           Septic shock 2/2 MRSA bacteremia  Metabolic encephalopathy  - more awake, alert today  , AOx2   - CTH 10/4 was negative for acute intracranial pathology  - speech and swallow eval:  puree with thin liquids  - Aspiration precaution     MRSA bacteremia  - perma cath removed 10/3, shiley placed 10/4- removed over w/e 10/14  - TTE 10/1/23 EF 45-50%, no vegetations  -Repeat TTE 10/11-  shows probable veg on mvr,  bc pos 10/11   - ID following  -  Appreciate Recs            - continue antibiotics             - f/u blood cx    - concern for endocarditis, but not a candidate for valve replacements    - Cardiology following - appreciate their  input and discussion they had w/ pts outpt cardiologist Dr Lainez:    patient's out pt cardiologist  prefers conservative approach and does not feel that ERENDIRA is appropriate for this pt given his overall condition   - pt w/  persistent bacteremia, 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       ESRD  - HD M/W/F;   - Nephro follows -  appreciated        Palliative ;   as a follow up  today , chart reviewed, weekend events noted, appreciate consultants input ,   I spoke w/ med team this AM, Dr Aleman , plan was set over the  w/e for pt to go home on home hospice   w/ girlfriend , Surekha as caregiver . Saw pt bedside today , he was more awake, alert, converses, speech slow, word searching , and remains confused about his med situation. Oriented x 2 self, " hospital"   Was not in distress, was calm, no sob, no c/o pain.  Asking to drink fluids.    Agree w/ med team plan for home hospice services.        Met w/ Surekha bedside today , she confirmed she was talking about home hospice over the w/e w/ medical  team.  She had many questions on what was involved, I explained  what home  hospice would offer , and would  bring to the home,  including, the bed, any oxygen , meds , supplies. Explained it would be covered by Medicare   Explained he would be assigned  to a  team by phone 24/7 , a  nurse would visit once a week , and a  HHA 4 hrs /day M-F only . I strongly recommended she look into  pvt hire HHA to supplement this care .  She stated understanding, and agreed to place consult .

## 2023-10-16 NOTE — PROGRESS NOTE ADULT - PROBLEM SELECTOR PLAN 1
Patient with sepsis and renal failure; unfortunately infectious process source cannot be eliminated, therefore patient on comfort care, off dialysis for family request.  No further need for transfusions.  Palliative care appropriate; prognosis grave.  Patient is DNR.

## 2023-10-16 NOTE — PROGRESS NOTE ADULT - ATTENDING COMMENTS
Patient seen and examined at bedside. No acute overnight events.    Vitals reviewed  Physical exam remarkable for edematous extremities    Pt w/ persistent MRSA Bacteremia, w/ repeat blood cultures positive    Accepted for home hospice, awaiting for date for discharge home

## 2023-10-16 NOTE — PROGRESS NOTE ADULT - SUBJECTIVE AND OBJECTIVE BOX
CC: f/u for   mrsa bacteremia  Patient reports he thinks he is ok    REVIEW OF SYSTEMS:  All other review of systems negative (Comprehensive ROS)    Antimicrobials Day #  :    Other Medications Reviewed    T(F): 97.6 (10-16-23 @ 12:34), Max: 97.6 (10-15-23 @ 20:08)  HR: 86 (10-16-23 @ 12:34)  BP: 106/52 (10-16-23 @ 12:34)  RR: 18 (10-16-23 @ 12:34)  SpO2: 93% (10-16-23 @ 12:34)  Wt(kg): --    PHYSICAL EXAM:  General: alert, confused, , no acute distress  Eyes:  anicteric, no conjunctival injection, no discharge  Oropharynx: no lesions or injection 	  Neck: supple, without adenopathy  Lungs: decreased at bases  to auscultation  Heart: regular rate and rhythm; no murmur, rubs or gallops  Abdomen: soft, nondistended, nontender, without mass or organomegaly  Skin: no lesions  Extremities: no clubbing, cyanosis, . arms and leg  edema  Neurologic: awake, speaks fluently moves all    LAB RESULTS:              MICROBIOLOGY:  RECENT CULTURES:      RADIOLOGY REVIEWED:  < from: CT Abdomen and Pelvis No Cont (10.11.23 @ 10:08) >    ACC: [id number] EXAM:  CT ABDOMEN AND PELVIS   ORDERED BY: SARA HERNANDEZ     ACC: [id number] EXAM:  CT CHEST   ORDERED BY: SARA HERNANDEZ     PROCEDURE DATE:  10/11/2023          INTERPRETATION:  CLINICAL INFORMATION: MRSA bacteremia.    COMPARISON: None.    CONTRAST/COMPLICATIONS:  IV Contrast: NONE  Oral Contrast: NONE  Complications: None reported at time of study completion    PROCEDURE:  CT of the Chest, Abdomen and Pelvis was performed.  Sagittal and coronal reformats were performed.    FINDINGS:  CHEST:  LUNGS AND LARGE AIRWAYS: Patent central airways. Bilateral lower lobe   atelectatic changes; underlying parenchymal infiltrate/consolidation   cannot be excluded. Interlobular septal thickening identified, raising   suspicion for an element of interstitial edema.  PLEURA: Moderate to large right pleural effusion. Small to moderate left   pleural effusion. No evidence for pneumothorax.  VESSELS: Coronary arterial calcifications. Atherosclerotic aortic   calcifications.  HEART: Cardiomegaly. No pericardial effusion. Micra pacemaker. 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  MEDIASTINUM AND VILLA: No lymphadenopathy.  CHEST WALL AND LOWER NECK: Within normal limits.    ABDOMEN AND PELVIS:  LIVER: The liver is normal in size. No focal hepatic masses are   identified.  BILE DUCTS: Normal caliber.  GALLBLADDER: Large gallstone measuring 2.8 cm. No definite gallbladder   wall thickening.  SPLEEN: Within normal limits.  PANCREAS: Within normal limits.  ADRENALS: Within normal limits.  KIDNEYS/URETERS: A horseshoe kidney, with bilateral renal calculi   measuring up to 10 mm. No hydronephrosis. No discrete space-occupying   lesions of the renal parenchyma noted.    BLADDER: Poorly distended; element of bladder wall thickening cannot be   excluded. Mild stranding of the perivesical fat noted; correlate for   cystitis.  REPRODUCTIVE ORGANS: Foci of prostatic calcification noted.    BOWEL: Fecal material scattered throughout the colon. No evidence for   mechanical bowel obstruction. No colonic wall thickening. Appendix is   normal.  PERITONEUM: Small volume intra-abdominal ascites. No free intraperitoneal   air.  VESSELS: Atherosclerotic changes.  RETROPERITONEUM/LYMPH NODES: No lymphadenopathy.  ABDOMINAL WALL: Fat containing bilateral inguinal hernias, right greater   than left. Small fat-containing umbilical hernia. There is diffuse   subcutaneous edema consistent with anasarca.  BONES: Left femoral neck pinning. Status post sternotomy. Degenerative   changes.    IMPRESSION:  1. Bilateral pleural effusions, right greater left. Bilateral lower lobe   atelectatic changes; underlying parenchymal infiltrate/consolidation   cannot be excluded. Interlobular septal thickening.  2. 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   Mild stranding of the perivesical fat, with suspected bladder wall   thickening. Correlate for cystitis.          --- End of Report ---            CHRISTAL PERDOMO MD; Attending Radiologist  This document has been electronically signed. Oct 11 2023  2:47PM    < end of copied text >              Assessment:  Patient with mvr, recent stay at [facility] for clostridium bacteremia, esrd on hd via permacath, came in a couple of weeks ago with high grade sustained mrsa bacteremia, veg on mvr, permacath pulled but still could not control bacteremia. He is now planned for comfort as goal  Plan:  off abx, for comfort  call if further input is needed CC: f/u for   mrsa bacteremia  Patient reports he thinks he is ok    REVIEW OF SYSTEMS:  All other review of systems negative (Comprehensive ROS)    Antimicrobials Day #  :    Other Medications Reviewed    T(F): 97.6 (10-16-23 @ 12:34), Max: 97.6 (10-15-23 @ 20:08)  HR: 86 (10-16-23 @ 12:34)  BP: 106/52 (10-16-23 @ 12:34)  RR: 18 (10-16-23 @ 12:34)  SpO2: 93% (10-16-23 @ 12:34)  Wt(kg): --    PHYSICAL EXAM:  General: alert, confused, , no acute distress  Eyes:  anicteric, no conjunctival injection, no discharge  Oropharynx: no lesions or injection 	  Neck: supple, without adenopathy  Lungs: decreased at bases  to auscultation  Heart: regular rate and rhythm; no murmur, rubs or gallops  Abdomen: soft, nondistended, nontender, without mass or organomegaly  Skin: no lesions  Extremities: no clubbing, cyanosis, . arms and leg  edema  Neurologic: awake, speaks fluently moves all    LAB RESULTS:              MICROBIOLOGY:  RECENT CULTURES:      RADIOLOGY REVIEWED:  < from: CT Abdomen and Pelvis No Cont (10.11.23 @ 10:08) >    ACC: [id number] EXAM:  CT ABDOMEN AND PELVIS   ORDERED BY: SARA HERNANDEZ     ACC: [id number] EXAM:  CT CHEST   ORDERED BY: SARA HERNANDEZ     PROCEDURE DATE:  10/11/2023          INTERPRETATION:  CLINICAL INFORMATION: MRSA bacteremia.    COMPARISON: None.    CONTRAST/COMPLICATIONS:  IV Contrast: NONE  Oral Contrast: NONE  Complications: None reported at time of study completion    PROCEDURE:  CT of the Chest, Abdomen and Pelvis was performed.  Sagittal and coronal reformats were performed.    FINDINGS:  CHEST:  LUNGS AND LARGE AIRWAYS: Patent central airways. Bilateral lower lobe   atelectatic changes; underlying parenchymal infiltrate/consolidation   cannot be excluded. Interlobular septal thickening identified, raising   suspicion for an element of interstitial edema.  PLEURA: Moderate to large right pleural effusion. Small to moderate left   pleural effusion. No evidence for pneumothorax.  VESSELS: Coronary arterial calcifications. Atherosclerotic aortic   calcifications.  HEART: Cardiomegaly. No pericardial effusion. Micra pacemaker. 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  MEDIASTINUM AND VILLA: No lymphadenopathy.  CHEST WALL AND LOWER NECK: Within normal limits.    ABDOMEN AND PELVIS:  LIVER: The liver is normal in size. No focal hepatic masses are   identified.  BILE DUCTS: Normal caliber.  GALLBLADDER: Large gallstone measuring 2.8 cm. No definite gallbladder   wall thickening.  SPLEEN: Within normal limits.  PANCREAS: Within normal limits.  ADRENALS: Within normal limits.  KIDNEYS/URETERS: A horseshoe kidney, with bilateral renal calculi   measuring up to 10 mm. No hydronephrosis. No discrete space-occupying   lesions of the renal parenchyma noted.    BLADDER: Poorly distended; element of bladder wall thickening cannot be   excluded. Mild stranding of the perivesical fat noted; correlate for   cystitis.  REPRODUCTIVE ORGANS: Foci of prostatic calcification noted.    BOWEL: Fecal material scattered throughout the colon. No evidence for   mechanical bowel obstruction. No colonic wall thickening. Appendix is   normal.  PERITONEUM: Small volume intra-abdominal ascites. No free intraperitoneal   air.  VESSELS: Atherosclerotic changes.  RETROPERITONEUM/LYMPH NODES: No lymphadenopathy.  ABDOMINAL WALL: Fat containing bilateral inguinal hernias, right greater   than left. Small fat-containing umbilical hernia. There is diffuse   subcutaneous edema consistent with anasarca.  BONES: Left femoral neck pinning. Status post sternotomy. Degenerative   changes.    IMPRESSION:  1. Bilateral pleural effusions, right greater left. Bilateral lower lobe   atelectatic changes; underlying parenchymal infiltrate/consolidation   cannot be excluded. Interlobular septal thickening.  2. 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   Mild stranding of the perivesical fat, with suspected bladder wall   thickening. Correlate for cystitis.          --- End of Report ---            CHRISTAL PERDOMO MD; Attending Radiologist  This document has been electronically signed. Oct 11 2023  2:47PM    < end of copied text >              Assessment:  Patient with mvr, recent stay at [facility] for clostridium bacteremia, esrd on hd via permacath, came in a couple of weeks ago with high grade sustained mrsa bacteremia, veg on mvr, permacath pulled but still could not control bacteremia. He is now planned for comfort as goal  Plan:  off abx, for comfort  call if further input is needed CC: f/u for   mrsa bacteremia  Patient reports he thinks he is ok    REVIEW OF SYSTEMS:  All other review of systems negative (Comprehensive ROS)    Antimicrobials Day #  :    Other Medications Reviewed    T(F): 97.6 (10-16-23 @ 12:34), Max: 97.6 (10-15-23 @ 20:08)  HR: 86 (10-16-23 @ 12:34)  BP: 106/52 (10-16-23 @ 12:34)  RR: 18 (10-16-23 @ 12:34)  SpO2: 93% (10-16-23 @ 12:34)  Wt(kg): --    PHYSICAL EXAM:  General: alert, confused, , no acute distress  Eyes:  anicteric, no conjunctival injection, no discharge  Oropharynx: no lesions or injection 	  Neck: supple, without adenopathy  Lungs: decreased at bases  to auscultation  Heart: regular rate and rhythm; no murmur, rubs or gallops  Abdomen: soft, nondistended, nontender, without mass or organomegaly  Skin: no lesions  Extremities: no clubbing, cyanosis, . arms and leg  edema  Neurologic: awake, speaks fluently moves all    LAB RESULTS:              MICROBIOLOGY:  RECENT CULTURES:      RADIOLOGY REVIEWED:  < from: CT Abdomen and Pelvis No Cont (10.11.23 @ 10:08) >    ACC: [id number] EXAM:  CT ABDOMEN AND PELVIS   ORDERED BY: SARA HERNANDEZ     ACC: [id number] EXAM:  CT CHEST   ORDERED BY: SARA HERNANDEZ     PROCEDURE DATE:  10/11/2023          INTERPRETATION:  CLINICAL INFORMATION: MRSA bacteremia.    COMPARISON: None.    CONTRAST/COMPLICATIONS:  IV Contrast: NONE  Oral Contrast: NONE  Complications: None reported at time of study completion    PROCEDURE:  CT of the Chest, Abdomen and Pelvis was performed.  Sagittal and coronal reformats were performed.    FINDINGS:  CHEST:  LUNGS AND LARGE AIRWAYS: Patent central airways. Bilateral lower lobe   atelectatic changes; underlying parenchymal infiltrate/consolidation   cannot be excluded. Interlobular septal thickening identified, raising   suspicion for an element of interstitial edema.  PLEURA: Moderate to large right pleural effusion. Small to moderate left   pleural effusion. No evidence for pneumothorax.  VESSELS: Coronary arterial calcifications. Atherosclerotic aortic   calcifications.  HEART: Cardiomegaly. No pericardial effusion. Micra pacemaker. 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  MEDIASTINUM AND VILLA: No lymphadenopathy.  CHEST WALL AND LOWER NECK: Within normal limits.    ABDOMEN AND PELVIS:  LIVER: The liver is normal in size. No focal hepatic masses are   identified.  BILE DUCTS: Normal caliber.  GALLBLADDER: Large gallstone measuring 2.8 cm. No definite gallbladder   wall thickening.  SPLEEN: Within normal limits.  PANCREAS: Within normal limits.  ADRENALS: Within normal limits.  KIDNEYS/URETERS: A horseshoe kidney, with bilateral renal calculi   measuring up to 10 mm. No hydronephrosis. No discrete space-occupying   lesions of the renal parenchyma noted.    BLADDER: Poorly distended; element of bladder wall thickening cannot be   excluded. Mild stranding of the perivesical fat noted; correlate for   cystitis.  REPRODUCTIVE ORGANS: Foci of prostatic calcification noted.    BOWEL: Fecal material scattered throughout the colon. No evidence for   mechanical bowel obstruction. No colonic wall thickening. Appendix is   normal.  PERITONEUM: Small volume intra-abdominal ascites. No free intraperitoneal   air.  VESSELS: Atherosclerotic changes.  RETROPERITONEUM/LYMPH NODES: No lymphadenopathy.  ABDOMINAL WALL: Fat containing bilateral inguinal hernias, right greater   than left. Small fat-containing umbilical hernia. There is diffuse   subcutaneous edema consistent with anasarca.  BONES: Left femoral neck pinning. Status post sternotomy. Degenerative   changes.    IMPRESSION:  1. Bilateral pleural effusions, right greater left. Bilateral lower lobe   atelectatic changes; underlying parenchymal infiltrate/consolidation   cannot be excluded. Interlobular septal thickening.  2. 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   Mild stranding of the perivesical fat, with suspected bladder wall   thickening. Correlate for cystitis.          --- End of Report ---            CHRISTAL PERDOMO MD; Attending Radiologist  This document has been electronically signed. Oct 11 2023  2:47PM    < end of copied text >              Assessment:  Patient with mvr, recent stay at [facility] for clostridium bacteremia, esrd on hd via permacath, came in a couple of weeks ago with high grade sustained mrsa bacteremia, veg on mvr, permacath pulled but still could not control bacteremia. He is now planned for comfort as goal  Plan:  off abx, for comfort  call if further input is needed

## 2023-10-16 NOTE — PROGRESS NOTE ADULT - SUBJECTIVE AND OBJECTIVE BOX
Resting, on RA    Vital Signs Last 24 Hrs  T(C): 36.4 (10-16-23 @ 18:04), Max: 36.4 (10-16-23 @ 05:00)  T(F): 97.6 (10-16-23 @ 18:04), Max: 97.6 (10-16-23 @ 12:34)  HR: 94 (10-16-23 @ 18:04) (75 - 94)  BP: 124/60 (10-16-23 @ 18:04) (106/52 - 129/61)  RR: 17 (10-16-23 @ 18:04) (17 - 18)  SpO2: 93% (10-16-23 @ 18:04) (93% - 94%)    I&O's Detail    15 Oct 2023 07:01  -  16 Oct 2023 07:00  --------------------------------------------------------  IN:    Oral Fluid: 200 mL  Total IN: 200 mL    OUT:    Voided (mL): 500 mL  Total OUT: 500 mL    16 Oct 2023 07:01  -  16 Oct 2023 21:00  --------------------------------------------------------  IN:    Oral Fluid: 320 mL  Total IN: 320 mL    OUT:  Total OUT: 0 mL    s1s2  b/l air entry  soft, ND  sm edema, LUE AVF + bruit                                                                                 acetaminophen     Tablet .. 650 milliGRAM(s) Oral every 6 hours PRN  chlorhexidine 2% Cloths 1 Application(s) Topical <User Schedule>  folic acid 1 milliGRAM(s) Oral daily  hydrocortisone hemorrhoidal Suppository 1 Suppository(s) Rectal two times a day  influenza  Vaccine (HIGH DOSE) 0.7 milliLiter(s) IntraMuscular once  polyethylene glycol 3350 17 Gram(s) Oral daily  sodium chloride 0.9% lock flush 10 milliLiter(s) IV Push every 1 hour PRN    A/P:    Hx ESRD on HD at [facility] MWF via perm cath  Hx Afib, CM, EF 45 - 50%  Has LUE AVF, not yet mature   Adm 9/30/23 w/MRSA bacteremia, persistent (most recent pos cx 10/11/23)  Perm cath d/c-d 10/2/23  Cath cx pos for MRSA  Endocarditis is suspected, however pt is determined not to be a surgical candidate per Cardiology  Overall options are limited and prognosis is poor  Palliative f/u appr  Per d/w HCP, comfort based approach, no further HD, no blood work     [phone] Resting, on RA    Vital Signs Last 24 Hrs  T(C): 36.4 (10-16-23 @ 18:04), Max: 36.4 (10-16-23 @ 05:00)  T(F): 97.6 (10-16-23 @ 18:04), Max: 97.6 (10-16-23 @ 12:34)  HR: 94 (10-16-23 @ 18:04) (75 - 94)  BP: 124/60 (10-16-23 @ 18:04) (106/52 - 129/61)  RR: 17 (10-16-23 @ 18:04) (17 - 18)  SpO2: 93% (10-16-23 @ 18:04) (93% - 94%)    I&O's Detail    15 Oct 2023 07:01  -  16 Oct 2023 07:00  --------------------------------------------------------  IN:    Oral Fluid: 200 mL  Total IN: 200 mL    OUT:    Voided (mL): 500 mL  Total OUT: 500 mL    16 Oct 2023 07:01  -  16 Oct 2023 21:00  --------------------------------------------------------  IN:    Oral Fluid: 320 mL  Total IN: 320 mL    OUT:  Total OUT: 0 mL    s1s2  b/l air entry  soft, ND  sm edema, LUE AVF + bruit                                                                                 acetaminophen     Tablet .. 650 milliGRAM(s) Oral every 6 hours PRN  chlorhexidine 2% Cloths 1 Application(s) Topical <User Schedule>  folic acid 1 milliGRAM(s) Oral daily  hydrocortisone hemorrhoidal Suppository 1 Suppository(s) Rectal two times a day  influenza  Vaccine (HIGH DOSE) 0.7 milliLiter(s) IntraMuscular once  polyethylene glycol 3350 17 Gram(s) Oral daily  sodium chloride 0.9% lock flush 10 milliLiter(s) IV Push every 1 hour PRN    A/P:    Hx ESRD on HD at [facility] MWF via perm cath  Hx Afib, CM, EF 45 - 50%  Has LUE AVF, not yet mature   Adm 9/30/23 w/MRSA bacteremia, persistent (most recent pos cx 10/11/23)  Perm cath d/c-d 10/2/23  Cath cx pos for MRSA  Endocarditis is suspected, however pt is determined not to be a surgical candidate per Cardiology  Overall options are limited and prognosis is poor  Palliative f/u appr  Per d/w HCP, comfort based approach, no further HD, no blood work     [phone] Resting, on RA    Vital Signs Last 24 Hrs  T(C): 36.4 (10-16-23 @ 18:04), Max: 36.4 (10-16-23 @ 05:00)  T(F): 97.6 (10-16-23 @ 18:04), Max: 97.6 (10-16-23 @ 12:34)  HR: 94 (10-16-23 @ 18:04) (75 - 94)  BP: 124/60 (10-16-23 @ 18:04) (106/52 - 129/61)  RR: 17 (10-16-23 @ 18:04) (17 - 18)  SpO2: 93% (10-16-23 @ 18:04) (93% - 94%)    I&O's Detail    15 Oct 2023 07:01  -  16 Oct 2023 07:00  --------------------------------------------------------  IN:    Oral Fluid: 200 mL  Total IN: 200 mL    OUT:    Voided (mL): 500 mL  Total OUT: 500 mL    16 Oct 2023 07:01  -  16 Oct 2023 21:00  --------------------------------------------------------  IN:    Oral Fluid: 320 mL  Total IN: 320 mL    OUT:  Total OUT: 0 mL    s1s2  b/l air entry  soft, ND  sm edema, LUE AVF + bruit                                                                                 acetaminophen     Tablet .. 650 milliGRAM(s) Oral every 6 hours PRN  chlorhexidine 2% Cloths 1 Application(s) Topical <User Schedule>  folic acid 1 milliGRAM(s) Oral daily  hydrocortisone hemorrhoidal Suppository 1 Suppository(s) Rectal two times a day  influenza  Vaccine (HIGH DOSE) 0.7 milliLiter(s) IntraMuscular once  polyethylene glycol 3350 17 Gram(s) Oral daily  sodium chloride 0.9% lock flush 10 milliLiter(s) IV Push every 1 hour PRN    A/P:    Hx ESRD on HD at [facility] MWF via perm cath  Hx Afib, CM, EF 45 - 50%  Has LUE AVF, not yet mature   Adm 9/30/23 w/MRSA bacteremia, persistent (most recent pos cx 10/11/23)  Perm cath d/c-d 10/2/23  Cath cx pos for MRSA  Endocarditis is suspected, however pt is determined not to be a surgical candidate per Cardiology  Overall options are limited and prognosis is poor  Palliative f/u appr  Per d/w HCP, comfort based approach, no further HD, no blood work     [phone]

## 2023-10-16 NOTE — PROGRESS NOTE ADULT - SUBJECTIVE AND OBJECTIVE BOX
JANEE ARCEO, 78y Male  MRN: [id number]  ATTENDING: Martin Almaguer    HPI:  78M, PMHx MVR, PPM, s/p removal for MSSA infection, ESRD, on dialysis, Clostridium bacteremia in August (proctitis source) admitted with fever (102.2) found with high-grade sustained MRSA bacteremia.  On 10/6/2023 patient had permacath removed.  Brought spectrum antibiotic coverage.  Not a candidate for surgical valve removal.  Patient is poor historian.  Hematology consulted for anemia and thrombocytopenia.    MEDICATIONS:  acetaminophen     Tablet .. 650 milliGRAM(s) Oral every 6 hours PRN  aluminum hydroxide/magnesium hydroxide/simethicone Suspension 10 milliLiter(s) Oral every 6 hours PRN  aspirin  chewable 81 milliGRAM(s) Oral daily  ceftaroline fosamil IVPB 200 milliGRAM(s) IV Intermittent once  ceftaroline fosamil IVPB      ceftaroline fosamil IVPB 200 milliGRAM(s) IV Intermittent every 8 hours  chlorhexidine 2% Cloths 1 Application(s) Topical <User Schedule>  epoetin val (PROCRIT) Injectable 13303 Unit(s) IV Push <User Schedule>  folic acid 1 milliGRAM(s) Oral daily  heparin   Injectable 5000 Unit(s) SubCutaneous every 12 hours  hydrocortisone hemorrhoidal Suppository 1 Suppository(s) Rectal two times a day  influenza  Vaccine (HIGH DOSE) 0.7 milliLiter(s) IntraMuscular once  levothyroxine 137 MICROGram(s) Oral daily  mesalamine Suppository 1000 milliGRAM(s) Rectal at bedtime  midodrine 10 milliGRAM(s) Oral every 8 hours  pantoprazole   Suspension 40 milliGRAM(s) Enteral Tube daily  polyethylene glycol 3350 17 Gram(s) Oral daily  senna 2 Tablet(s) Oral at bedtime  sodium chloride 0.9% lock flush 10 milliLiter(s) IV Push every 1 hour PRN  vancomycin  IVPB 750 milliGRAM(s) IV Intermittent <User Schedule>    All other medications reviewed.    SUBJECTIVE:  Lethargic, does not appear in distress.    VITALS:  T(C): 36.6 (10-09-23 @ 12:30), Max: 37.7 (10-08-23 @ 21:19)  T(F): 97.9 (10-09-23 @ 12:30), Max: 99.9 (10-08-23 @ 21:19)  HR: 93 (10-09-23 @ 13:07) (74 - 122)  BP: 89/52 (10-09-23 @ 13:07) (89/52 - 127/66)      PHYSICAL EXAM:  Constitutional: lethargic  HEENT: normocephalic, anicteric sclerae, no mucositis or thrush  Respiratory: bilateral clear to auscultation anteriorly  Cardiovascular : S1, S2 regular, rhythmic, no murmurs, gallops or rubs  Abdomen: soft, distended, + normoactive BS, no palpable HS- megaly  Extremities: no tenderness;  -c/c/e, pulses equal bilaterally    LABS:  (10-09) WBC: 18.06 K/uL,Hemoglobin: 7.8 g/dL, Hematocrit: 22.2 %,  Platelet: 111 K/uL  (10-09) Na: 134 mmol/L ; K: 4.1 mmol/L ; BUN: 82 mg/dL ; Cr: 5.27 mg/dL.    RADIOLOGY:  ACC: [id number] EXAM:  CT BRAIN   ORDERED BY: JULIA CRUZ     PROCEDURE DATE:  10/04/2023          INTERPRETATION:  CLINICAL INFORMATION: Altered mental status.    TECHNIQUE:  Noncontrast axial CT images were acquired through the head.  Sagittal and coronal reformats were performed.    COMPARISON STUDY: None    FINDINGS:  There is no CT evidence of acute intracranial hemorrhage, mass effect or   midline shift.  There is no acute loss of gray matter-white matter   differentiation.    Slight prominence of the ventricles is compatible with mild age-related   involutional changes.  No clinically significant chronic microvascular   ischemic disease or other white matter pathology is visualized by CT   technique.      The paranasal sinuses and mastoids are grossly clear.    The patient is status post intraocular lens replacement bilaterally..    The calvarium and skull base appear within normal limits.    IMPRESSION:  No CT evidence of acute intracranial pathology.     JANEE ARCEO, 78y Male  MRN: [id number]  ATTENDING: Martin Almaguer    HPI:  78M, PMHx MVR, PPM, s/p removal for MSSA infection, ESRD, on dialysis, Clostridium bacteremia in August (proctitis source) admitted with fever (102.2) found with high-grade sustained MRSA bacteremia.  On 10/6/2023 patient had permacath removed.  Brought spectrum antibiotic coverage.  Not a candidate for surgical valve removal.  Patient is poor historian.  Hematology consulted for anemia and thrombocytopenia.    MEDICATIONS:  acetaminophen     Tablet .. 650 milliGRAM(s) Oral every 6 hours PRN  aluminum hydroxide/magnesium hydroxide/simethicone Suspension 10 milliLiter(s) Oral every 6 hours PRN  aspirin  chewable 81 milliGRAM(s) Oral daily  ceftaroline fosamil IVPB 200 milliGRAM(s) IV Intermittent once  ceftaroline fosamil IVPB      ceftaroline fosamil IVPB 200 milliGRAM(s) IV Intermittent every 8 hours  chlorhexidine 2% Cloths 1 Application(s) Topical <User Schedule>  epoetin val (PROCRIT) Injectable 51152 Unit(s) IV Push <User Schedule>  folic acid 1 milliGRAM(s) Oral daily  heparin   Injectable 5000 Unit(s) SubCutaneous every 12 hours  hydrocortisone hemorrhoidal Suppository 1 Suppository(s) Rectal two times a day  influenza  Vaccine (HIGH DOSE) 0.7 milliLiter(s) IntraMuscular once  levothyroxine 137 MICROGram(s) Oral daily  mesalamine Suppository 1000 milliGRAM(s) Rectal at bedtime  midodrine 10 milliGRAM(s) Oral every 8 hours  pantoprazole   Suspension 40 milliGRAM(s) Enteral Tube daily  polyethylene glycol 3350 17 Gram(s) Oral daily  senna 2 Tablet(s) Oral at bedtime  sodium chloride 0.9% lock flush 10 milliLiter(s) IV Push every 1 hour PRN  vancomycin  IVPB 750 milliGRAM(s) IV Intermittent <User Schedule>    All other medications reviewed.    SUBJECTIVE:  Lethargic, does not appear in distress.    VITALS:  T(C): 36.6 (10-09-23 @ 12:30), Max: 37.7 (10-08-23 @ 21:19)  T(F): 97.9 (10-09-23 @ 12:30), Max: 99.9 (10-08-23 @ 21:19)  HR: 93 (10-09-23 @ 13:07) (74 - 122)  BP: 89/52 (10-09-23 @ 13:07) (89/52 - 127/66)      PHYSICAL EXAM:  Constitutional: lethargic  HEENT: normocephalic, anicteric sclerae, no mucositis or thrush  Respiratory: bilateral clear to auscultation anteriorly  Cardiovascular : S1, S2 regular, rhythmic, no murmurs, gallops or rubs  Abdomen: soft, distended, + normoactive BS, no palpable HS- megaly  Extremities: no tenderness;  -c/c/e, pulses equal bilaterally    LABS:  (10-09) WBC: 18.06 K/uL,Hemoglobin: 7.8 g/dL, Hematocrit: 22.2 %,  Platelet: 111 K/uL  (10-09) Na: 134 mmol/L ; K: 4.1 mmol/L ; BUN: 82 mg/dL ; Cr: 5.27 mg/dL.    RADIOLOGY:  ACC: [id number] EXAM:  CT BRAIN   ORDERED BY: JULIA CRUZ     PROCEDURE DATE:  10/04/2023          INTERPRETATION:  CLINICAL INFORMATION: Altered mental status.    TECHNIQUE:  Noncontrast axial CT images were acquired through the head.  Sagittal and coronal reformats were performed.    COMPARISON STUDY: None    FINDINGS:  There is no CT evidence of acute intracranial hemorrhage, mass effect or   midline shift.  There is no acute loss of gray matter-white matter   differentiation.    Slight prominence of the ventricles is compatible with mild age-related   involutional changes.  No clinically significant chronic microvascular   ischemic disease or other white matter pathology is visualized by CT   technique.      The paranasal sinuses and mastoids are grossly clear.    The patient is status post intraocular lens replacement bilaterally..    The calvarium and skull base appear within normal limits.    IMPRESSION:  No CT evidence of acute intracranial pathology.     JANEE ARCEO, 78y Male  MRN: [id number]  ATTENDING: Martin Almaguer    HPI:  78M, PMHx MVR, PPM, s/p removal for MSSA infection, ESRD, on dialysis, Clostridium bacteremia in August (proctitis source) admitted with fever (102.2) found with high-grade sustained MRSA bacteremia.  On 10/6/2023 patient had permacath removed.  Brought spectrum antibiotic coverage.  Not a candidate for surgical valve removal.  Patient is poor historian.  Hematology consulted for anemia and thrombocytopenia.    MEDICATIONS:  acetaminophen     Tablet .. 650 milliGRAM(s) Oral every 6 hours PRN  aluminum hydroxide/magnesium hydroxide/simethicone Suspension 10 milliLiter(s) Oral every 6 hours PRN  aspirin  chewable 81 milliGRAM(s) Oral daily  ceftaroline fosamil IVPB 200 milliGRAM(s) IV Intermittent once  ceftaroline fosamil IVPB      ceftaroline fosamil IVPB 200 milliGRAM(s) IV Intermittent every 8 hours  chlorhexidine 2% Cloths 1 Application(s) Topical <User Schedule>  epoetin val (PROCRIT) Injectable 75716 Unit(s) IV Push <User Schedule>  folic acid 1 milliGRAM(s) Oral daily  heparin   Injectable 5000 Unit(s) SubCutaneous every 12 hours  hydrocortisone hemorrhoidal Suppository 1 Suppository(s) Rectal two times a day  influenza  Vaccine (HIGH DOSE) 0.7 milliLiter(s) IntraMuscular once  levothyroxine 137 MICROGram(s) Oral daily  mesalamine Suppository 1000 milliGRAM(s) Rectal at bedtime  midodrine 10 milliGRAM(s) Oral every 8 hours  pantoprazole   Suspension 40 milliGRAM(s) Enteral Tube daily  polyethylene glycol 3350 17 Gram(s) Oral daily  senna 2 Tablet(s) Oral at bedtime  sodium chloride 0.9% lock flush 10 milliLiter(s) IV Push every 1 hour PRN  vancomycin  IVPB 750 milliGRAM(s) IV Intermittent <User Schedule>    All other medications reviewed.    SUBJECTIVE:  Lethargic, does not appear in distress.    VITALS:  T(C): 36.6 (10-09-23 @ 12:30), Max: 37.7 (10-08-23 @ 21:19)  T(F): 97.9 (10-09-23 @ 12:30), Max: 99.9 (10-08-23 @ 21:19)  HR: 93 (10-09-23 @ 13:07) (74 - 122)  BP: 89/52 (10-09-23 @ 13:07) (89/52 - 127/66)      PHYSICAL EXAM:  Constitutional: lethargic  HEENT: normocephalic, anicteric sclerae, no mucositis or thrush  Respiratory: bilateral clear to auscultation anteriorly  Cardiovascular : S1, S2 regular, rhythmic, no murmurs, gallops or rubs  Abdomen: soft, distended, + normoactive BS, no palpable HS- megaly  Extremities: no tenderness;  -c/c/e, pulses equal bilaterally    LABS:  (10-09) WBC: 18.06 K/uL,Hemoglobin: 7.8 g/dL, Hematocrit: 22.2 %,  Platelet: 111 K/uL  (10-09) Na: 134 mmol/L ; K: 4.1 mmol/L ; BUN: 82 mg/dL ; Cr: 5.27 mg/dL.    RADIOLOGY:  ACC: [id number] EXAM:  CT BRAIN   ORDERED BY: JULIA CRUZ     PROCEDURE DATE:  10/04/2023          INTERPRETATION:  CLINICAL INFORMATION: Altered mental status.    TECHNIQUE:  Noncontrast axial CT images were acquired through the head.  Sagittal and coronal reformats were performed.    COMPARISON STUDY: None    FINDINGS:  There is no CT evidence of acute intracranial hemorrhage, mass effect or   midline shift.  There is no acute loss of gray matter-white matter   differentiation.    Slight prominence of the ventricles is compatible with mild age-related   involutional changes.  No clinically significant chronic microvascular   ischemic disease or other white matter pathology is visualized by CT   technique.      The paranasal sinuses and mastoids are grossly clear.    The patient is status post intraocular lens replacement bilaterally..    The calvarium and skull base appear within normal limits.    IMPRESSION:  No CT evidence of acute intracranial pathology.

## 2023-10-16 NOTE — PROGRESS NOTE ADULT - SUBJECTIVE AND OBJECTIVE BOX
Progress:  more awake today and verbal, makes needs known, still sl confused,     Present Symptoms:   Dyspnea: no  Nausea/Vomiting: no  Anxiety:    Depressed Mood:   Fatigue:   Loss of appetite:   Pain:        no           location:   Review of Systems: [Unable to obtain due to poor mentation]    MEDICATIONS  (STANDING):  chlorhexidine 2% Cloths 1 Application(s) Topical <User Schedule>  folic acid 1 milliGRAM(s) Oral daily  hydrocortisone hemorrhoidal Suppository 1 Suppository(s) Rectal two times a day  influenza  Vaccine (HIGH DOSE) 0.7 milliLiter(s) IntraMuscular once  polyethylene glycol 3350 17 Gram(s) Oral daily    MEDICATIONS  (PRN):  acetaminophen     Tablet .. 650 milliGRAM(s) Oral every 6 hours PRN Mild Pain (1 - 3)  sodium chloride 0.9% lock flush 10 milliLiter(s) IV Push every 1 hour PRN Pre/post blood products, medications, blood draw, and to maintain line patency      PHYSICAL EXAM:  Vital Signs Last 24 Hrs  T(C): 36.4 (16 Oct 2023 05:00), Max: 36.4 (15 Oct 2023 12:52)  T(F): 97.5 (16 Oct 2023 05:00), Max: 97.6 (15 Oct 2023 20:08)  HR: 75 (16 Oct 2023 05:00) (75 - 87)  BP: 129/61 (16 Oct 2023 05:00) (110/60 - 129/61)  BP(mean): --  RR: 18 (16 Oct 2023 05:00) (17 - 18)  SpO2: 94% (16 Oct 2023 05:00) (92% - 94%)    Parameters below as of 16 Oct 2023 05:00  Patient On (Oxygen Delivery Method): room air      General: alert  oriented x 2, knew " hospital" but not exact place , unaware of his medical issues        HEENT: n/c, a/t, mouth , lips dry     Lungs: ess cl dim bases , breathing comfortable   CV: normal  rate  GI: abd lrg., soft, n/t     : normal, incontinent     Musculoskeletal: harmon's, weak ,  edematous,    Skin: normal , pale    Neuro: sl deficits , more awake, more alert , speaking, oriented x 2    Oral intake ability:  oral feeding w/ assist   Diet: pureed w/ thins      LABS:                RADIOLOGY & ADDITIONAL STUDIES:     ADVANCE DIRECTIVES:  HCP, Molst dnr/dni no feeding tubes   Advanced Care Planning discussion total time spent:

## 2023-10-16 NOTE — PROGRESS NOTE ADULT - ASSESSMENT
Patient is a 78M h/o ESRD, Afib on Eliquis, CAD s/p PCI, prosthetic s/p MVR, h/o proctitis, hemorrhoids, hypothyroidism was in ICU for metabolic encephalopathy, septic shock 2/2 MRSA bacteremia s/p tunnel cath removal 10/3, IJ Shiley placed 10/4, downgraded to 3E for further management. Patient continues to be bacteremic and prognosis is poor. Pending hospice evaluation, hospice referral in place.      #Septic shock 2/2 MRSA bacteremia  #Metabolic encephalopathy  - awake, AOx1  - CTH 10/4 was negative for acute intracranial pathology  - midodrine discontinued  - maintain O2 sat >94%  - speech and swallow eval: puree with thin liquids  - Aspiration precaution    #MRSA bacteremia  - permacath removed 10/3, shiley placed 10/4  - shiley removal 10/15  - TTE 10/1/23 EF 45-50%, no vegetations  - s/p ceftaroline, daptomycin, discontinued as patient is transitioned to comfort care only  - BCx 10/11 MRSA  - concern for endocarditis, but not a candidate for valve replacements  - CT chest, abd/pelvis 10/11: b/l pleural effusions with lower lobe atelectatic changes, parenchymal infiltrate/consolidation, interlobular septal thickening. Renal calculi, horshoe kidney, suspected bladder wall thickening.  - ID: can hold on ERENDIRA as TTE showed probable vegetations on mvr  - Patient continues to be bacteremic despite antibiotic coverage prior to transition to comfort care    #ESRD  - HD M/W/F discontinued  - last HD 10/13  - Shiley placed in ICU 10/4; shiley removal 10/15  - Nephro consult appreciated  - daily weights    Anemia of ESRD/AOCD  -H/H has been stable  -FOBT negative    Thrombocytopenia (Improved)  -Heme following  -Likely secondary to sepsis      #Afib  - CHADsVASc 5  - rate controlled  - h/o CAD s/p PCI, s/p MVR    #Hypothyroidism  - TSH elevated      #h/o hemorrhoids, proctitis  - c/w hydrocortisone suppository, mesalamine  - c/w bowel regimen    #DVT ppx  - Eliquis 5mg bid discontinued    DNR/DNI. Palliative on board.

## 2023-10-17 PROCEDURE — 93970 EXTREMITY STUDY: CPT | Mod: 26

## 2023-10-17 PROCEDURE — 80048 BASIC METABOLIC PNL TOTAL CA: CPT

## 2023-10-17 PROCEDURE — 87150 DNA/RNA AMPLIFIED PROBE: CPT

## 2023-10-17 PROCEDURE — 85027 COMPLETE CBC AUTOMATED: CPT

## 2023-10-17 PROCEDURE — 92610 EVALUATE SWALLOWING FUNCTION: CPT

## 2023-10-17 PROCEDURE — 82747 ASSAY OF FOLIC ACID RBC: CPT

## 2023-10-17 PROCEDURE — 93306 TTE W/DOPPLER COMPLETE: CPT

## 2023-10-17 PROCEDURE — 99232 SBSQ HOSP IP/OBS MODERATE 35: CPT | Mod: GC

## 2023-10-17 PROCEDURE — 85379 FIBRIN DEGRADATION QUANT: CPT

## 2023-10-17 PROCEDURE — 87637 SARSCOV2&INF A&B&RSV AMP PRB: CPT

## 2023-10-17 PROCEDURE — 87040 BLOOD CULTURE FOR BACTERIA: CPT

## 2023-10-17 PROCEDURE — 96367 TX/PROPH/DG ADDL SEQ IV INF: CPT

## 2023-10-17 PROCEDURE — 76770 US EXAM ABDO BACK WALL COMP: CPT

## 2023-10-17 PROCEDURE — 70450 CT HEAD/BRAIN W/O DYE: CPT

## 2023-10-17 PROCEDURE — 85610 PROTHROMBIN TIME: CPT

## 2023-10-17 PROCEDURE — 82728 ASSAY OF FERRITIN: CPT

## 2023-10-17 PROCEDURE — 83550 IRON BINDING TEST: CPT

## 2023-10-17 PROCEDURE — 96365 THER/PROPH/DIAG IV INF INIT: CPT

## 2023-10-17 PROCEDURE — 85045 AUTOMATED RETICULOCYTE COUNT: CPT

## 2023-10-17 PROCEDURE — 84100 ASSAY OF PHOSPHORUS: CPT

## 2023-10-17 PROCEDURE — 87077 CULTURE AEROBIC IDENTIFY: CPT

## 2023-10-17 PROCEDURE — 96375 TX/PRO/DX INJ NEW DRUG ADDON: CPT

## 2023-10-17 PROCEDURE — 71045 X-RAY EXAM CHEST 1 VIEW: CPT

## 2023-10-17 PROCEDURE — 83615 LACTATE (LD) (LDH) ENZYME: CPT

## 2023-10-17 PROCEDURE — P9047: CPT

## 2023-10-17 PROCEDURE — 87641 MR-STAPH DNA AMP PROBE: CPT

## 2023-10-17 PROCEDURE — 83605 ASSAY OF LACTIC ACID: CPT

## 2023-10-17 PROCEDURE — 87640 STAPH A DNA AMP PROBE: CPT

## 2023-10-17 PROCEDURE — 87340 HEPATITIS B SURFACE AG IA: CPT

## 2023-10-17 PROCEDURE — 86706 HEP B SURFACE ANTIBODY: CPT

## 2023-10-17 PROCEDURE — 87186 SC STD MICRODIL/AGAR DIL: CPT

## 2023-10-17 PROCEDURE — 71250 CT THORAX DX C-: CPT

## 2023-10-17 PROCEDURE — 36415 COLL VENOUS BLD VENIPUNCTURE: CPT

## 2023-10-17 PROCEDURE — 86803 HEPATITIS C AB TEST: CPT

## 2023-10-17 PROCEDURE — 99285 EMERGENCY DEPT VISIT HI MDM: CPT

## 2023-10-17 PROCEDURE — 88300 SURGICAL PATH GROSS: CPT

## 2023-10-17 PROCEDURE — 82550 ASSAY OF CK (CPK): CPT

## 2023-10-17 PROCEDURE — P9100: CPT

## 2023-10-17 PROCEDURE — 82746 ASSAY OF FOLIC ACID SERUM: CPT

## 2023-10-17 PROCEDURE — 74176 CT ABD & PELVIS W/O CONTRAST: CPT

## 2023-10-17 PROCEDURE — 93005 ELECTROCARDIOGRAM TRACING: CPT

## 2023-10-17 PROCEDURE — 86850 RBC ANTIBODY SCREEN: CPT

## 2023-10-17 PROCEDURE — P9037: CPT

## 2023-10-17 PROCEDURE — 85025 COMPLETE CBC W/AUTO DIFF WBC: CPT

## 2023-10-17 PROCEDURE — 85730 THROMBOPLASTIN TIME PARTIAL: CPT

## 2023-10-17 PROCEDURE — 93308 TTE F-UP OR LMTD: CPT

## 2023-10-17 PROCEDURE — 83540 ASSAY OF IRON: CPT

## 2023-10-17 PROCEDURE — 83735 ASSAY OF MAGNESIUM: CPT

## 2023-10-17 PROCEDURE — 99232 SBSQ HOSP IP/OBS MODERATE 35: CPT

## 2023-10-17 PROCEDURE — 86704 HEP B CORE ANTIBODY TOTAL: CPT

## 2023-10-17 PROCEDURE — 82607 VITAMIN B-12: CPT

## 2023-10-17 PROCEDURE — 36430 TRANSFUSION BLD/BLD COMPNT: CPT

## 2023-10-17 PROCEDURE — 87070 CULTURE OTHR SPECIMN AEROBIC: CPT

## 2023-10-17 PROCEDURE — 80202 ASSAY OF VANCOMYCIN: CPT

## 2023-10-17 PROCEDURE — 85384 FIBRINOGEN ACTIVITY: CPT

## 2023-10-17 PROCEDURE — 80053 COMPREHEN METABOLIC PANEL: CPT

## 2023-10-17 PROCEDURE — 86901 BLOOD TYPING SEROLOGIC RH(D): CPT

## 2023-10-17 PROCEDURE — 84443 ASSAY THYROID STIM HORMONE: CPT

## 2023-10-17 PROCEDURE — 86900 BLOOD TYPING SEROLOGIC ABO: CPT

## 2023-10-17 PROCEDURE — 93970 EXTREMITY STUDY: CPT

## 2023-10-17 PROCEDURE — 0225U NFCT DS DNA&RNA 21 SARSCOV2: CPT

## 2023-10-17 PROCEDURE — 99261: CPT

## 2023-10-17 PROCEDURE — 82272 OCCULT BLD FECES 1-3 TESTS: CPT

## 2023-10-17 PROCEDURE — 92526 ORAL FUNCTION THERAPY: CPT

## 2023-10-17 RX ORDER — TRAMADOL HYDROCHLORIDE 50 MG/1
50 TABLET ORAL EVERY 4 HOURS
Refills: 0 | Status: DISCONTINUED | OUTPATIENT
Start: 2023-10-17 | End: 2023-10-17

## 2023-10-17 RX ORDER — TRAMADOL HYDROCHLORIDE 50 MG/1
50 TABLET ORAL EVERY 12 HOURS
Refills: 0 | Status: DISCONTINUED | OUTPATIENT
Start: 2023-10-17 | End: 2023-10-18

## 2023-10-17 RX ADMIN — Medication 1 MILLIGRAM(S): at 12:01

## 2023-10-17 RX ADMIN — CHLORHEXIDINE GLUCONATE 1 APPLICATION(S): 213 SOLUTION TOPICAL at 07:05

## 2023-10-17 RX ADMIN — Medication 1 SUPPOSITORY(S): at 05:26

## 2023-10-17 RX ADMIN — POLYETHYLENE GLYCOL 3350 17 GRAM(S): 17 POWDER, FOR SOLUTION ORAL at 12:02

## 2023-10-17 RX ADMIN — TRAMADOL HYDROCHLORIDE 50 MILLIGRAM(S): 50 TABLET ORAL at 12:00

## 2023-10-17 RX ADMIN — Medication 650 MILLIGRAM(S): at 08:21

## 2023-10-17 RX ADMIN — TRAMADOL HYDROCHLORIDE 50 MILLIGRAM(S): 50 TABLET ORAL at 13:39

## 2023-10-17 RX ADMIN — Medication 650 MILLIGRAM(S): at 09:36

## 2023-10-17 NOTE — PROGRESS NOTE ADULT - PROBLEM SELECTOR PLAN 1
Patient with sepsis and renal failure; unfortunately infectious process source cannot be eliminated, therefore patient on comfort care, off dialysis for family request.  No further need for transfusions.  Palliative care appropriate; prognosis grave.  Patient is DNR. Sepsis, evidence of mitral valve vegetation.  No more role for antibiotics or blood work.  Patient evaluated by palliative care; good candidate for hospice.  Social work discussion regarding placement.  Patient is DNR.  Overall prognosis grave.

## 2023-10-17 NOTE — PROGRESS NOTE ADULT - SUBJECTIVE AND OBJECTIVE BOX
Patient is a 78y old  Male who presents with a chief complaint of Sepsis (17 Oct 2023 12:03)      INTERVAL HPI/OVERNIGHT EVENTS: Patient seen and examined at bedside. No overnight events. Patient reports right leg pain that started this morning.    MEDICATIONS  (STANDING):  chlorhexidine 2% Cloths 1 Application(s) Topical <User Schedule>  folic acid 1 milliGRAM(s) Oral daily  hydrocortisone hemorrhoidal Suppository 1 Suppository(s) Rectal two times a day  influenza  Vaccine (HIGH DOSE) 0.7 milliLiter(s) IntraMuscular once  polyethylene glycol 3350 17 Gram(s) Oral daily    MEDICATIONS  (PRN):  acetaminophen     Tablet .. 650 milliGRAM(s) Oral every 6 hours PRN Mild Pain (1 - 3)  sodium chloride 0.9% lock flush 10 milliLiter(s) IV Push every 1 hour PRN Pre/post blood products, medications, blood draw, and to maintain line patency  traMADol 50 milliGRAM(s) Oral every 12 hours PRN Moderate Pain (4 - 6)      Allergies    levofloxacin (Unknown)  alfuzosin (Unknown)  tamsulosin (Unknown)  Myrbetriq (Unknown)    Intolerances      Vital Signs Last 24 Hrs  T(C): 36.3 (17 Oct 2023 11:58), Max: 36.4 (16 Oct 2023 18:04)  T(F): 97.3 (17 Oct 2023 11:58), Max: 97.6 (16 Oct 2023 18:04)  HR: 81 (17 Oct 2023 11:58) (77 - 94)  BP: 97/53 (17 Oct 2023 11:58) (97/53 - 124/60)  BP(mean): --  RR: 18 (17 Oct 2023 11:58) (17 - 19)  SpO2: 95% (17 Oct 2023 11:58) (93% - 95%)    Parameters below as of 17 Oct 2023 11:58  Patient On (Oxygen Delivery Method): room air      I&O's Summary    16 Oct 2023 07:01  -  17 Oct 2023 07:00  --------------------------------------------------------  IN: 320 mL / OUT: 300 mL / NET: 20 mL      PHYSICAL EXAM:  GENERAL: NAD  HEENT:  AT/NC, moist mucous membranes  CHEST/LUNG:  CTA b/l, no rales, wheezes, or rhonchi,  normal respiratory effort, no intercostal retractions  HEART:  irregular HR, S1, S2  ABDOMEN:  BS+, soft, nontender, nondistended  MSK/EXTREMITIES: rt calf pain with passive raise of leg, nontender, no erythema/swelling right compared to left, 2+ peripheral pulses  NERVOUS SYSTEM: awake, alert      LABS: Personally reviewed  CBC    CMP                                              Culture - Blood (collected 11 Oct 2023 05:52)  Source: .Blood Blood-Peripheral  Gram Stain (12 Oct 2023 09:49):    Growth in aerobic bottle: Gram Positive Cocci in Clusters    Growth in anaerobic bottle: Gram Positive Cocci in Clusters  Final Report (13 Oct 2023 06:31):    Growth in aerobic and anaerobic bottles: Methicillin Resistant    Staphylococcus aureus    See previous culture [id number]    Culture - Blood (collected 11 Oct 2023 05:26)  Source: .Blood Blood-Peripheral  Gram Stain (13 Oct 2023 03:20):    Growth in aerobic bottle: Gram Positive Cocci in Clusters    Growth in anaerobic bottle: Gram Positive Cocci in Clusters  Final Report (14 Oct 2023 14:12):    Growth in aerobic and anaerobic bottles: Methicillin Resistant    Staphylococcus aureus    See previous culture [id number]            Culture - Blood (collected 10-11-23 @ 05:52)  Source: .Blood Blood-Peripheral  Gram Stain (10-12-23 @ 09:49):    Growth in aerobic bottle: Gram Positive Cocci in Clusters    Growth in anaerobic bottle: Gram Positive Cocci in Clusters  Final Report (10-13-23 @ 06:31):    Growth in aerobic and anaerobic bottles: Methicillin Resistant    Staphylococcus aureus    See previous culture [id number]    Culture - Blood (collected 10-11-23 @ 05:26)  Source: .Blood Blood-Peripheral  Gram Stain (10-13-23 @ 03:20):    Growth in aerobic bottle: Gram Positive Cocci in Clusters    Growth in anaerobic bottle: Gram Positive Cocci in Clusters  Final Report (10-14-23 @ 14:12):    Growth in aerobic and anaerobic bottles: Methicillin Resistant    Staphylococcus aureus    See previous culture [id number]        RADIOLOGY & ADDITIONAL TESTS: Personally reviewed.     Consultant(s) Notes Reviewed:  [x] YES  [ ] NO   Discussed with SW/PEPPER, RN     Patient is a 78y old  Male who presents with a chief complaint of Sepsis (17 Oct 2023 12:03)      INTERVAL HPI/OVERNIGHT EVENTS: Patient seen and examined at bedside. No overnight events. Patient reports right leg pain that started this morning.    MEDICATIONS  (STANDING):  chlorhexidine 2% Cloths 1 Application(s) Topical <User Schedule>  folic acid 1 milliGRAM(s) Oral daily  hydrocortisone hemorrhoidal Suppository 1 Suppository(s) Rectal two times a day  influenza  Vaccine (HIGH DOSE) 0.7 milliLiter(s) IntraMuscular once  polyethylene glycol 3350 17 Gram(s) Oral daily    MEDICATIONS  (PRN):  acetaminophen     Tablet .. 650 milliGRAM(s) Oral every 6 hours PRN Mild Pain (1 - 3)  sodium chloride 0.9% lock flush 10 milliLiter(s) IV Push every 1 hour PRN Pre/post blood products, medications, blood draw, and to maintain line patency  traMADol 50 milliGRAM(s) Oral every 12 hours PRN Moderate Pain (4 - 6)      Allergies    levofloxacin (Unknown)  alfuzosin (Unknown)  tamsulosin (Unknown)  Myrbetriq (Unknown)    Intolerances      Vital Signs Last 24 Hrs  T(C): 36.3 (17 Oct 2023 11:58), Max: 36.4 (16 Oct 2023 18:04)  T(F): 97.3 (17 Oct 2023 11:58), Max: 97.6 (16 Oct 2023 18:04)  HR: 81 (17 Oct 2023 11:58) (77 - 94)  BP: 97/53 (17 Oct 2023 11:58) (97/53 - 124/60)  BP(mean): --  RR: 18 (17 Oct 2023 11:58) (17 - 19)  SpO2: 95% (17 Oct 2023 11:58) (93% - 95%)    Parameters below as of 17 Oct 2023 11:58  Patient On (Oxygen Delivery Method): room air      I&O's Summary    16 Oct 2023 07:01  -  17 Oct 2023 07:00  --------------------------------------------------------  IN: 320 mL / OUT: 300 mL / NET: 20 mL      PHYSICAL EXAM:  GENERAL: NAD  HEENT:  AT/NC, moist mucous membranes  CHEST/LUNG:  CTA b/l, no rales, wheezes, or rhonchi,  normal respiratory effort, no intercostal retractions  HEART:  irregular HR, S1, S2  ABDOMEN:  BS+, soft, nontender, nondistended  MSK/EXTREMITIES: rt calf pain with passive raise of leg, nontender, no erythema/swelling right compared to left, 2+ peripheral pulses  NERVOUS SYSTEM: awake, alert      LABS: Personally reviewed  CBC    CMP                                              Culture - Blood (collected 11 Oct 2023 05:52)  Source: .Blood Blood-Peripheral  Gram Stain (12 Oct 2023 09:49):    Growth in aerobic bottle: Gram Positive Cocci in Clusters    Growth in anaerobic bottle: Gram Positive Cocci in Clusters  Final Report (13 Oct 2023 06:31):    Growth in aerobic and anaerobic bottles: Methicillin Resistant    Staphylococcus aureus    See previous culture [id number]    Culture - Blood (collected 11 Oct 2023 05:26)  Source: .Blood Blood-Peripheral  Gram Stain (13 Oct 2023 03:20):    Growth in aerobic bottle: Gram Positive Cocci in Clusters    Growth in anaerobic bottle: Gram Positive Cocci in Clusters  Final Report (14 Oct 2023 14:12):    Growth in aerobic and anaerobic bottles: Methicillin Resistant    Staphylococcus aureus    See previous culture [id number]            Culture - Blood (collected 10-11-23 @ 05:52)  Source: .Blood Blood-Peripheral  Gram Stain (10-12-23 @ 09:49):    Growth in aerobic bottle: Gram Positive Cocci in Clusters    Growth in anaerobic bottle: Gram Positive Cocci in Clusters  Final Report (10-13-23 @ 06:31):    Growth in aerobic and anaerobic bottles: Methicillin Resistant    Staphylococcus aureus    See previous culture [id number]    Culture - Blood (collected 10-11-23 @ 05:26)  Source: .Blood Blood-Peripheral  Gram Stain (10-13-23 @ 03:20):    Growth in aerobic bottle: Gram Positive Cocci in Clusters    Growth in anaerobic bottle: Gram Positive Cocci in Clusters  Final Report (10-14-23 @ 14:12):    Growth in aerobic and anaerobic bottles: Methicillin Resistant    Staphylococcus aureus    See previous culture [id number]        RADIOLOGY & ADDITIONAL TESTS: Personally reviewed.     Consultant(s) Notes Reviewed:  [x] YES  [ ] NO   Discussed with SW/PEPPER, RN     Patient is a 78y old  Male who presents with a chief complaint of Sepsis (17 Oct 2023 12:03)      INTERVAL HPI/OVERNIGHT EVENTS: Patient seen and examined at bedside. No overnight events. Patient reports right leg pain that started this morning.    MEDICATIONS  (STANDING):  chlorhexidine 2% Cloths 1 Application(s) Topical <User Schedule>  folic acid 1 milliGRAM(s) Oral daily  hydrocortisone hemorrhoidal Suppository 1 Suppository(s) Rectal two times a day  influenza  Vaccine (HIGH DOSE) 0.7 milliLiter(s) IntraMuscular once  polyethylene glycol 3350 17 Gram(s) Oral daily    MEDICATIONS  (PRN):  acetaminophen     Tablet .. 650 milliGRAM(s) Oral every 6 hours PRN Mild Pain (1 - 3)  sodium chloride 0.9% lock flush 10 milliLiter(s) IV Push every 1 hour PRN Pre/post blood products, medications, blood draw, and to maintain line patency  traMADol 50 milliGRAM(s) Oral every 12 hours PRN Moderate Pain (4 - 6)      Allergies    levofloxacin (Unknown)  alfuzosin (Unknown)  tamsulosin (Unknown)  Myrbetriq (Unknown)    Intolerances      Vital Signs Last 24 Hrs  T(C): 36.3 (17 Oct 2023 11:58), Max: 36.4 (16 Oct 2023 18:04)  T(F): 97.3 (17 Oct 2023 11:58), Max: 97.6 (16 Oct 2023 18:04)  HR: 81 (17 Oct 2023 11:58) (77 - 94)  BP: 97/53 (17 Oct 2023 11:58) (97/53 - 124/60)  BP(mean): --  RR: 18 (17 Oct 2023 11:58) (17 - 19)  SpO2: 95% (17 Oct 2023 11:58) (93% - 95%)    Parameters below as of 17 Oct 2023 11:58  Patient On (Oxygen Delivery Method): room air      I&O's Summary    16 Oct 2023 07:01  -  17 Oct 2023 07:00  --------------------------------------------------------  IN: 320 mL / OUT: 300 mL / NET: 20 mL      PHYSICAL EXAM:  GENERAL: NAD  HEENT:  AT/NC, moist mucous membranes  CHEST/LUNG:  CTA b/l, no rales, wheezes, or rhonchi,  normal respiratory effort, no intercostal retractions  HEART:  irregular HR, S1, S2  ABDOMEN:  BS+, soft, nontender, nondistended  MSK/EXTREMITIES: rt calf pain with passive raise of leg, nontender, no erythema/swelling right compared to left, 2+ peripheral pulses  NERVOUS SYSTEM: awake, alert      LABS: Personally reviewed  CBC    CMP                                              Culture - Blood (collected 11 Oct 2023 05:52)  Source: .Blood Blood-Peripheral  Gram Stain (12 Oct 2023 09:49):    Growth in aerobic bottle: Gram Positive Cocci in Clusters    Growth in anaerobic bottle: Gram Positive Cocci in Clusters  Final Report (13 Oct 2023 06:31):    Growth in aerobic and anaerobic bottles: Methicillin Resistant    Staphylococcus aureus    See previous culture [id number]    Culture - Blood (collected 11 Oct 2023 05:26)  Source: .Blood Blood-Peripheral  Gram Stain (13 Oct 2023 03:20):    Growth in aerobic bottle: Gram Positive Cocci in Clusters    Growth in anaerobic bottle: Gram Positive Cocci in Clusters  Final Report (14 Oct 2023 14:12):    Growth in aerobic and anaerobic bottles: Methicillin Resistant    Staphylococcus aureus    See previous culture [id number]            Culture - Blood (collected 10-11-23 @ 05:52)  Source: .Blood Blood-Peripheral  Gram Stain (10-12-23 @ 09:49):    Growth in aerobic bottle: Gram Positive Cocci in Clusters    Growth in anaerobic bottle: Gram Positive Cocci in Clusters  Final Report (10-13-23 @ 06:31):    Growth in aerobic and anaerobic bottles: Methicillin Resistant    Staphylococcus aureus    See previous culture [id number]    Culture - Blood (collected 10-11-23 @ 05:26)  Source: .Blood Blood-Peripheral  Gram Stain (10-13-23 @ 03:20):    Growth in aerobic bottle: Gram Positive Cocci in Clusters    Growth in anaerobic bottle: Gram Positive Cocci in Clusters  Final Report (10-14-23 @ 14:12):    Growth in aerobic and anaerobic bottles: Methicillin Resistant    Staphylococcus aureus    See previous culture [id number]        RADIOLOGY & ADDITIONAL TESTS: Personally reviewed.     Consultant(s) Notes Reviewed:  [x] YES  [ ] NO   Discussed with SW/PEPPER, RN

## 2023-10-17 NOTE — PROGRESS NOTE ADULT - SUBJECTIVE AND OBJECTIVE BOX
Progress:  more awake, and alert today , still w/ delayed , slow speech     Present Symptoms:   Dyspnea: no  Nausea/Vomiting:   Anxiety:    Depressed Mood:   Fatigue: yes  Loss of appetite:   Pain:         yes          location: R knee  Review of Systems: [All others negative   MEDICATIONS  (STANDING):  chlorhexidine 2% Cloths 1 Application(s) Topical <User Schedule>  folic acid 1 milliGRAM(s) Oral daily  hydrocortisone hemorrhoidal Suppository 1 Suppository(s) Rectal two times a day  influenza  Vaccine (HIGH DOSE) 0.7 milliLiter(s) IntraMuscular once  polyethylene glycol 3350 17 Gram(s) Oral daily    MEDICATIONS  (PRN):  acetaminophen     Tablet .. 650 milliGRAM(s) Oral every 6 hours PRN Mild Pain (1 - 3)  sodium chloride 0.9% lock flush 10 milliLiter(s) IV Push every 1 hour PRN Pre/post blood products, medications, blood draw, and to maintain line patency  traMADol 50 milliGRAM(s) Oral every 12 hours PRN Moderate Pain (4 - 6)      PHYSICAL EXAM:  Vital Signs Last 24 Hrs  T(C): 36.3 (17 Oct 2023 11:58), Max: 36.4 (16 Oct 2023 12:34)  T(F): 97.3 (17 Oct 2023 11:58), Max: 97.6 (16 Oct 2023 12:34)  HR: 81 (17 Oct 2023 11:58) (77 - 94)  BP: 97/53 (17 Oct 2023 11:58) (97/53 - 124/60)  BP(mean): --  RR: 18 (17 Oct 2023 11:58) (17 - 19)  SpO2: 95% (17 Oct 2023 11:58) (93% - 95%)    Parameters below as of 17 Oct 2023 11:58  Patient On (Oxygen Delivery Method): room air      General: alert  oriented x 2-3       HEENT: n/c, a/t, dry lips , mouth     Lungs: ess clear dim to bases    CV: normal  rate  GI: abd lrg., soft     : normal  , incontinent   Musculoskeletal: edematous, weak    Skin: pale, w/d     Neuro: awake, alert, oriented 2-3 ,  converses,    Oral intake ability:  oral feeding w/ set up  Diet: soft w/ thins      LABS:                RADIOLOGY & ADDITIONAL STUDIES:     ADVANCE DIRECTIVES:  Molst dnr/dni   Advanced Care Planning discussion total time spent:

## 2023-10-17 NOTE — PROGRESS NOTE ADULT - ASSESSMENT
Patient is a 78M h/o ESRD, Afib on Eliquis, CAD s/p PCI, prosthetic s/p MVR, h/o proctitis, hemorrhoids, hypothyroidism was in ICU for metabolic encephalopathy, septic shock 2/2 MRSA bacteremia s/p tunnel cath removal 10/3, IJ Shiley placed 10/4, downgraded to 3E for further management. Patient continues to be bacteremic and prognosis is poor. Pending hospice evaluation, hospice referral in place.      #Septic shock 2/2 MRSA bacteremia  #Metabolic encephalopathy  - awake, AOx1  - CTH 10/4 was negative for acute intracranial pathology  - midodrine discontinued  - maintain O2 sat >94%  - speech and swallow eval: puree with thin liquids  - Aspiration precaution    #MRSA bacteremia  - permacath removed 10/3, shiley placed 10/4  - shiley removal 10/15  - TTE 10/1/23 EF 45-50%, no vegetations  - s/p ceftaroline, daptomycin, discontinued as patient is transitioned to comfort care only  - BCx 10/11 MRSA  - concern for endocarditis, but not a candidate for valve replacements  - CT chest, abd/pelvis 10/11: b/l pleural effusions with lower lobe atelectatic changes, parenchymal infiltrate/consolidation, interlobular septal thickening. Renal calculi, horshoe kidney, suspected bladder wall thickening.  - ID: can hold on ERENDIRA as TTE showed probable vegetations on mvr  - Patient continues to be bacteremic despite antibiotic coverage prior to transition to comfort care    #ESRD  - HD M/W/F discontinued  - last HD 10/13  - Shiley placed in ICU 10/4; shiley removal 10/15  - Nephro consult appreciated  - daily weights    Anemia of ESRD/AOCD  -H/H has been stable  -FOBT negative    Thrombocytopenia (Improved)  -Heme following  -Likely secondary to sepsis      #Afib  - CHADsVASc 5  - rate controlled  - h/o CAD s/p PCI, s/p MVR    #Hypothyroidism  - TSH elevated      #h/o hemorrhoids, proctitis  - c/w hydrocortisone suppository, mesalamine  - c/w bowel regimen    #DVT ppx  - Eliquis 5mg bid discontinued    DNR/DNI. Palliative on board. Patient is a 78M h/o ESRD, Afib on Eliquis, CAD s/p PCI, prosthetic s/p MVR, h/o proctitis, hemorrhoids, hypothyroidism was in ICU for metabolic encephalopathy, septic shock 2/2 MRSA bacteremia s/p tunnel cath removal 10/3, IJ Shiley placed 10/4, downgraded to 3E for further management. Patient continues to be bacteremic and prognosis is poor. Patient dispo to home hospice.      #Septic shock 2/2 MRSA bacteremia  #Metabolic encephalopathy  - CTH 10/4 was negative for acute intracranial pathology  - midodrine discontinued  - maintain O2 sat >94%  - speech and swallow eval: puree with thin liquids  - Aspiration precaution    #MRSA bacteremia  - permacath removed 10/3, shiley placed 10/4, shiley removed 10/15  - TTE 10/1/23 EF 45-50%, no vegetations  - s/p ceftaroline, daptomycin, discontinued as patient is transitioned to comfort care only  - BCx 10/11 MRSA  - concern for endocarditis, but not a candidate for valve replacements  - CT chest, abd/pelvis 10/11: b/l pleural effusions with lower lobe atelectatic changes, parenchymal infiltrate/consolidation, interlobular septal thickening. Renal calculi, horshoe kidney, suspected bladder wall thickening.  - ID: can hold on ERENDIRA as TTE showed probable vegetations on mvr  - Patient continues to be bacteremic despite antibiotic coverage prior to transition to comfort care    #Leg pain, left  - ordered doppler ultrasound  - given tramadol for moderate pain    #ESRD  - HD M/W/F discontinued  - last HD 10/13  - Shiley placed in ICU 10/4; shiley removal 10/15  - Nephro consult appreciated  - daily weights    Anemia of ESRD/AOCD  -H/H has been stable  -FOBT negative    Thrombocytopenia (Improved)  -Heme following  -Likely secondary to sepsis      #Afib  - CHADsVASc 5  - rate controlled  - h/o CAD s/p PCI, s/p MVR    #Hypothyroidism  - TSH elevated      #h/o hemorrhoids, proctitis  - c/w hydrocortisone suppository, mesalamine  - c/w bowel regimen    #DVT ppx  - Eliquis 5mg bid discontinued    DNR/DNI. Palliative on board.

## 2023-10-17 NOTE — PROGRESS NOTE ADULT - ATTENDING COMMENTS
Patient seen and examined at bedside. No acute overnight events.    Vitals reviewed  Physical exam remarkable for edematous extremities    Pt w/ persistent MRSA Bacteremia, w/ repeat blood cultures positive    Accepted for home hospice. Pt's partner working on private aide services at home as well

## 2023-10-17 NOTE — PROGRESS NOTE ADULT - ASSESSMENT
A/P 79 yo M pmhx ESRD came to ED complaining of fatigue from GG JOEL . Patient  also febrile to 102.2 in ED with complains of cough and wheezing.  Noted to be hypotensive to 80's systolic. Course in ICU , now  downgraded to 3E.   Persistent MRSA bacteremia despite antibiotics and continues w/ AMS            Assessment/Plans:       Septic shock 2/2 MRSA bacteremia  Metabolic encephalopathy  - more awake, alert today  , A/O x 2 - 3    - CTH 10/4 was negative for acute intracranial pathology  - speech and swallow eval:  puree with thin liquids  - Aspiration precaution         MRSA bacteremia  - perma cath removed 10/3, shiley placed 10/4- removed over w/e 10/14  - TTE 10/1/23 EF 45-50%, no vegetations  -Repeat TTE 10/11-  shows probable veg on mvr,  bc pos 10/11   - ID following  -  Appreciate Recs    - concern for endocarditis, but not a candidate for valve replacements    - Cardiology following - appreciate their  input and discussion they had w/ pts outpt cardiologist Dr Lainez:    patient's out pt cardiologist  prefers conservative approach and does not feel that ERENDIRA is appropriate for this pt given his overall condition   - pt w/  persistent bacteremia, 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     ESRD  - HD M/W/F; - was not tolerating HD , became hypotensive   - Nephro follows    Palliative ;   as a follow up  today, pt more awake, alert, more oriented, converses.  Says he knows the plan is to go home, and he said he is aware he may pass away soon.    Has friends he wants to see, and a girl friend as his support.  had c/o some R knee/leg pain today - said  tylenol was not helpful - will order ultram Prn for pain.  he was approved for home hospice services, however there are no hospice HHA's  available for at least a few weeks.   Girl friend Surekha is aware and she will be looking  to hire pvt HHA's       A/P 77 yo M pmhx ESRD came to ED complaining of fatigue from GG JOEL . Patient  also febrile to 102.2 in ED with complains of cough and wheezing.  Noted to be hypotensive to 80's systolic. Course in ICU , now  downgraded to 3E.   Persistent MRSA bacteremia despite antibiotics and continues w/ AMS            Assessment/Plans:       Septic shock 2/2 MRSA bacteremia  Metabolic encephalopathy  - more awake, alert today  , A/O x 2 - 3    - CTH 10/4 was negative for acute intracranial pathology  - speech and swallow eval:  puree with thin liquids  - Aspiration precaution         MRSA bacteremia  - perma cath removed 10/3, shiley placed 10/4- removed over w/e 10/14  - TTE 10/1/23 EF 45-50%, no vegetations  -Repeat TTE 10/11-  shows probable veg on mvr,  bc pos 10/11   - ID following  -  Appreciate Recs    - concern for endocarditis, but not a candidate for valve replacements    - Cardiology following - appreciate their  input and discussion they had w/ pts outpt cardiologist Dr Lainez:    patient's out pt cardiologist  prefers conservative approach and does not feel that ERENDIRA is appropriate for this pt given his overall condition   - pt w/  persistent bacteremia, 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     ESRD  - HD M/W/F; - was not tolerating HD , became hypotensive   - Nephro follows    Palliative ;   as a follow up  today, pt more awake, alert, more oriented, converses.  Says he knows the plan is to go home, and he said he is aware he may pass away soon.    Has friends he wants to see, and a girl friend as his support.  had c/o some R knee/leg pain today - said  tylenol was not helpful - will order ultram Prn for pain.  he was approved for home hospice services, however there are no hospice HHA's  available for at least a few weeks.   Girl friend Surekha is aware and she will be looking  to hire pvt HHA's       A/P 79 yo M pmhx ESRD came to ED complaining of fatigue from GG JOEL . Patient  also febrile to 102.2 in ED with complains of cough and wheezing.  Noted to be hypotensive to 80's systolic. Course in ICU , now  downgraded to 3E.   Persistent MRSA bacteremia despite antibiotics and continues w/ AMS            Assessment/Plans:       Septic shock 2/2 MRSA bacteremia  Metabolic encephalopathy  - more awake, alert today  , A/O x 2 - 3    - CTH 10/4 was negative for acute intracranial pathology  - speech and swallow eval:  puree with thin liquids  - Aspiration precaution         MRSA bacteremia  - perma cath removed 10/3, shiley placed 10/4- removed over w/e 10/14  - TTE 10/1/23 EF 45-50%, no vegetations  -Repeat TTE 10/11-  shows probable veg on mvr,  bc pos 10/11   - ID following  -  Appreciate Recs    - concern for endocarditis, but not a candidate for valve replacements    - Cardiology following - appreciate their  input and discussion they had w/ pts outpt cardiologist Dr Lainez:    patient's out pt cardiologist  prefers conservative approach and does not feel that ERENDIRA is appropriate for this pt given his overall condition   - pt w/  persistent bacteremia, 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     ESRD  - HD M/W/F; - was not tolerating HD , became hypotensive   - Nephro follows    Palliative ;   as a follow up  today, pt more awake, alert, more oriented, converses.  Says he knows the plan is to go home, and he said he is aware he may pass away soon.    Has friends he wants to see, and a girl friend as his support.  had c/o some R knee/leg pain today - said  tylenol was not helpful - will order ultram Prn for pain.  he was approved for home hospice services, however there are no Hospice HHA's  available for at least a few weeks.   Girl friend Surekha is aware and she will be looking into hiring  pvt HHA's    Med team/SW aware .          A/P 77 yo M pmhx ESRD came to ED complaining of fatigue from GG JOEL . Patient  also febrile to 102.2 in ED with complains of cough and wheezing.  Noted to be hypotensive to 80's systolic. Course in ICU , now  downgraded to 3E.   Persistent MRSA bacteremia despite antibiotics and continues w/ AMS            Assessment/Plans:       Septic shock 2/2 MRSA bacteremia  Metabolic encephalopathy  - more awake, alert today  , A/O x 2 - 3    - CTH 10/4 was negative for acute intracranial pathology  - speech and swallow eval:  puree with thin liquids  - Aspiration precaution         MRSA bacteremia  - perma cath removed 10/3, shiley placed 10/4- removed over w/e 10/14  - TTE 10/1/23 EF 45-50%, no vegetations  -Repeat TTE 10/11-  shows probable veg on mvr,  bc pos 10/11   - ID following  -  Appreciate Recs    - concern for endocarditis, but not a candidate for valve replacements    - Cardiology following - appreciate their  input and discussion they had w/ pts outpt cardiologist Dr Lainez:    patient's out pt cardiologist  prefers conservative approach and does not feel that ERENDIRA is appropriate for this pt given his overall condition   - pt w/  persistent bacteremia, 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     ESRD  - HD M/W/F; - was not tolerating HD , became hypotensive   - Nephro follows    Palliative ;   as a follow up  today, pt more awake, alert, more oriented, converses.  Says he knows the plan is to go home, and he said he is aware he may pass away soon.    Has friends he wants to see, and a girl friend as his support.  had c/o some R knee/leg pain today - said  tylenol was not helpful - will order ultram Prn for pain.  he was approved for home hospice services, however there are no Hospice HHA's  available for at least a few weeks.   Girl friend Surekha is aware and she will be looking into hiring  pvt HHA's    Med team/SW aware .

## 2023-10-17 NOTE — PROGRESS NOTE ADULT - SUBJECTIVE AND OBJECTIVE BOX
JANEE ARCEO, 78y Male  MRN: [id number]  ATTENDING: Martin Almaguer    HPI:  78M, PMHx MVR, PPM, s/p removal for MSSA infection, ESRD, on dialysis, Clostridium bacteremia in August (proctitis source) admitted with fever (102.2) found with high-grade sustained MRSA bacteremia.  On 10/6/2023 patient had permacath removed.  Brought spectrum antibiotic coverage.  Not a candidate for surgical valve removal.  Patient is poor historian.  Hematology consulted for anemia and thrombocytopenia.    MEDICATIONS:  acetaminophen     Tablet .. 650 milliGRAM(s) Oral every 6 hours PRN  aluminum hydroxide/magnesium hydroxide/simethicone Suspension 10 milliLiter(s) Oral every 6 hours PRN  aspirin  chewable 81 milliGRAM(s) Oral daily  ceftaroline fosamil IVPB 200 milliGRAM(s) IV Intermittent once  ceftaroline fosamil IVPB      ceftaroline fosamil IVPB 200 milliGRAM(s) IV Intermittent every 8 hours  chlorhexidine 2% Cloths 1 Application(s) Topical <User Schedule>  epoetin val (PROCRIT) Injectable 01391 Unit(s) IV Push <User Schedule>  folic acid 1 milliGRAM(s) Oral daily  heparin   Injectable 5000 Unit(s) SubCutaneous every 12 hours  hydrocortisone hemorrhoidal Suppository 1 Suppository(s) Rectal two times a day  influenza  Vaccine (HIGH DOSE) 0.7 milliLiter(s) IntraMuscular once  levothyroxine 137 MICROGram(s) Oral daily  mesalamine Suppository 1000 milliGRAM(s) Rectal at bedtime  midodrine 10 milliGRAM(s) Oral every 8 hours  pantoprazole   Suspension 40 milliGRAM(s) Enteral Tube daily  polyethylene glycol 3350 17 Gram(s) Oral daily  senna 2 Tablet(s) Oral at bedtime  sodium chloride 0.9% lock flush 10 milliLiter(s) IV Push every 1 hour PRN  vancomycin  IVPB 750 milliGRAM(s) IV Intermittent <User Schedule>    All other medications reviewed.    SUBJECTIVE:  Lethargic, does not appear in distress.    VITALS:  T(C): 36.6 (10-09-23 @ 12:30), Max: 37.7 (10-08-23 @ 21:19)  T(F): 97.9 (10-09-23 @ 12:30), Max: 99.9 (10-08-23 @ 21:19)  HR: 93 (10-09-23 @ 13:07) (74 - 122)  BP: 89/52 (10-09-23 @ 13:07) (89/52 - 127/66)      PHYSICAL EXAM:  Constitutional: lethargic  HEENT: normocephalic, anicteric sclerae, no mucositis or thrush  Respiratory: bilateral clear to auscultation anteriorly  Cardiovascular : S1, S2 regular, rhythmic, no murmurs, gallops or rubs  Abdomen: soft, distended, + normoactive BS, no palpable HS- megaly  Extremities: no tenderness;  -c/c/e, pulses equal bilaterally    LABS:  (10-09) WBC: 18.06 K/uL,Hemoglobin: 7.8 g/dL, Hematocrit: 22.2 %,  Platelet: 111 K/uL  (10-09) Na: 134 mmol/L ; K: 4.1 mmol/L ; BUN: 82 mg/dL ; Cr: 5.27 mg/dL.    RADIOLOGY:  ACC: [id number] EXAM:  CT BRAIN   ORDERED BY: JULIA CRUZ     PROCEDURE DATE:  10/04/2023          INTERPRETATION:  CLINICAL INFORMATION: Altered mental status.    TECHNIQUE:  Noncontrast axial CT images were acquired through the head.  Sagittal and coronal reformats were performed.    COMPARISON STUDY: None    FINDINGS:  There is no CT evidence of acute intracranial hemorrhage, mass effect or   midline shift.  There is no acute loss of gray matter-white matter   differentiation.    Slight prominence of the ventricles is compatible with mild age-related   involutional changes.  No clinically significant chronic microvascular   ischemic disease or other white matter pathology is visualized by CT   technique.      The paranasal sinuses and mastoids are grossly clear.    The patient is status post intraocular lens replacement bilaterally..    The calvarium and skull base appear within normal limits.    IMPRESSION:  No CT evidence of acute intracranial pathology.     JANEE ARCEO, 78y Male  MRN: [id number]  ATTENDING: Martin Almaguer    HPI:  78M, PMHx MVR, PPM, s/p removal for MSSA infection, ESRD, on dialysis, Clostridium bacteremia in August (proctitis source) admitted with fever (102.2) found with high-grade sustained MRSA bacteremia.  On 10/6/2023 patient had permacath removed.  Brought spectrum antibiotic coverage.  Not a candidate for surgical valve removal.  Patient is poor historian.  Hematology consulted for anemia and thrombocytopenia.    MEDICATIONS:  acetaminophen     Tablet .. 650 milliGRAM(s) Oral every 6 hours PRN  aluminum hydroxide/magnesium hydroxide/simethicone Suspension 10 milliLiter(s) Oral every 6 hours PRN  aspirin  chewable 81 milliGRAM(s) Oral daily  ceftaroline fosamil IVPB 200 milliGRAM(s) IV Intermittent once  ceftaroline fosamil IVPB      ceftaroline fosamil IVPB 200 milliGRAM(s) IV Intermittent every 8 hours  chlorhexidine 2% Cloths 1 Application(s) Topical <User Schedule>  epoetin val (PROCRIT) Injectable 38666 Unit(s) IV Push <User Schedule>  folic acid 1 milliGRAM(s) Oral daily  heparin   Injectable 5000 Unit(s) SubCutaneous every 12 hours  hydrocortisone hemorrhoidal Suppository 1 Suppository(s) Rectal two times a day  influenza  Vaccine (HIGH DOSE) 0.7 milliLiter(s) IntraMuscular once  levothyroxine 137 MICROGram(s) Oral daily  mesalamine Suppository 1000 milliGRAM(s) Rectal at bedtime  midodrine 10 milliGRAM(s) Oral every 8 hours  pantoprazole   Suspension 40 milliGRAM(s) Enteral Tube daily  polyethylene glycol 3350 17 Gram(s) Oral daily  senna 2 Tablet(s) Oral at bedtime  sodium chloride 0.9% lock flush 10 milliLiter(s) IV Push every 1 hour PRN  vancomycin  IVPB 750 milliGRAM(s) IV Intermittent <User Schedule>    All other medications reviewed.    SUBJECTIVE:  Lethargic, does not appear in distress.    VITALS:  T(C): 36.6 (10-09-23 @ 12:30), Max: 37.7 (10-08-23 @ 21:19)  T(F): 97.9 (10-09-23 @ 12:30), Max: 99.9 (10-08-23 @ 21:19)  HR: 93 (10-09-23 @ 13:07) (74 - 122)  BP: 89/52 (10-09-23 @ 13:07) (89/52 - 127/66)      PHYSICAL EXAM:  Constitutional: lethargic  HEENT: normocephalic, anicteric sclerae, no mucositis or thrush  Respiratory: bilateral clear to auscultation anteriorly  Cardiovascular : S1, S2 regular, rhythmic, no murmurs, gallops or rubs  Abdomen: soft, distended, + normoactive BS, no palpable HS- megaly  Extremities: no tenderness;  -c/c/e, pulses equal bilaterally    LABS:  (10-09) WBC: 18.06 K/uL,Hemoglobin: 7.8 g/dL, Hematocrit: 22.2 %,  Platelet: 111 K/uL  (10-09) Na: 134 mmol/L ; K: 4.1 mmol/L ; BUN: 82 mg/dL ; Cr: 5.27 mg/dL.    RADIOLOGY:  ACC: [id number] EXAM:  CT BRAIN   ORDERED BY: JULIA CRUZ     PROCEDURE DATE:  10/04/2023          INTERPRETATION:  CLINICAL INFORMATION: Altered mental status.    TECHNIQUE:  Noncontrast axial CT images were acquired through the head.  Sagittal and coronal reformats were performed.    COMPARISON STUDY: None    FINDINGS:  There is no CT evidence of acute intracranial hemorrhage, mass effect or   midline shift.  There is no acute loss of gray matter-white matter   differentiation.    Slight prominence of the ventricles is compatible with mild age-related   involutional changes.  No clinically significant chronic microvascular   ischemic disease or other white matter pathology is visualized by CT   technique.      The paranasal sinuses and mastoids are grossly clear.    The patient is status post intraocular lens replacement bilaterally..    The calvarium and skull base appear within normal limits.    IMPRESSION:  No CT evidence of acute intracranial pathology.     JANEE ARCEO, 78y Male  MRN: [id number]  ATTENDING: Martin Almaguer    HPI:  78M, PMHx MVR, PPM, s/p removal for MSSA infection, ESRD, on dialysis, Clostridium bacteremia in August (proctitis source) admitted with fever (102.2) found with high-grade sustained MRSA bacteremia.  On 10/6/2023 patient had permacath removed.  Brought spectrum antibiotic coverage.  Not a candidate for surgical valve removal.  Patient is poor historian.  Hematology consulted for anemia and thrombocytopenia.    MEDICATIONS:  acetaminophen     Tablet .. 650 milliGRAM(s) Oral every 6 hours PRN  aluminum hydroxide/magnesium hydroxide/simethicone Suspension 10 milliLiter(s) Oral every 6 hours PRN  aspirin  chewable 81 milliGRAM(s) Oral daily  ceftaroline fosamil IVPB 200 milliGRAM(s) IV Intermittent once  ceftaroline fosamil IVPB      ceftaroline fosamil IVPB 200 milliGRAM(s) IV Intermittent every 8 hours  chlorhexidine 2% Cloths 1 Application(s) Topical <User Schedule>  epoetin val (PROCRIT) Injectable 89153 Unit(s) IV Push <User Schedule>  folic acid 1 milliGRAM(s) Oral daily  heparin   Injectable 5000 Unit(s) SubCutaneous every 12 hours  hydrocortisone hemorrhoidal Suppository 1 Suppository(s) Rectal two times a day  influenza  Vaccine (HIGH DOSE) 0.7 milliLiter(s) IntraMuscular once  levothyroxine 137 MICROGram(s) Oral daily  mesalamine Suppository 1000 milliGRAM(s) Rectal at bedtime  midodrine 10 milliGRAM(s) Oral every 8 hours  pantoprazole   Suspension 40 milliGRAM(s) Enteral Tube daily  polyethylene glycol 3350 17 Gram(s) Oral daily  senna 2 Tablet(s) Oral at bedtime  sodium chloride 0.9% lock flush 10 milliLiter(s) IV Push every 1 hour PRN  vancomycin  IVPB 750 milliGRAM(s) IV Intermittent <User Schedule>    All other medications reviewed.    SUBJECTIVE:  Lethargic, does not appear in distress.    VITALS:  T(C): 36.6 (10-09-23 @ 12:30), Max: 37.7 (10-08-23 @ 21:19)  T(F): 97.9 (10-09-23 @ 12:30), Max: 99.9 (10-08-23 @ 21:19)  HR: 93 (10-09-23 @ 13:07) (74 - 122)  BP: 89/52 (10-09-23 @ 13:07) (89/52 - 127/66)      PHYSICAL EXAM:  Constitutional: lethargic  HEENT: normocephalic, anicteric sclerae, no mucositis or thrush  Respiratory: bilateral clear to auscultation anteriorly  Cardiovascular : S1, S2 regular, rhythmic, no murmurs, gallops or rubs  Abdomen: soft, distended, + normoactive BS, no palpable HS- megaly  Extremities: no tenderness;  -c/c/e, pulses equal bilaterally    LABS:  (10-09) WBC: 18.06 K/uL,Hemoglobin: 7.8 g/dL, Hematocrit: 22.2 %,  Platelet: 111 K/uL  (10-09) Na: 134 mmol/L ; K: 4.1 mmol/L ; BUN: 82 mg/dL ; Cr: 5.27 mg/dL.    RADIOLOGY:  ACC: [id number] EXAM:  CT BRAIN   ORDERED BY: JULIA CRUZ     PROCEDURE DATE:  10/04/2023          INTERPRETATION:  CLINICAL INFORMATION: Altered mental status.    TECHNIQUE:  Noncontrast axial CT images were acquired through the head.  Sagittal and coronal reformats were performed.    COMPARISON STUDY: None    FINDINGS:  There is no CT evidence of acute intracranial hemorrhage, mass effect or   midline shift.  There is no acute loss of gray matter-white matter   differentiation.    Slight prominence of the ventricles is compatible with mild age-related   involutional changes.  No clinically significant chronic microvascular   ischemic disease or other white matter pathology is visualized by CT   technique.      The paranasal sinuses and mastoids are grossly clear.    The patient is status post intraocular lens replacement bilaterally..    The calvarium and skull base appear within normal limits.    IMPRESSION:  No CT evidence of acute intracranial pathology.     JANEE ARCEO, 78y Male  MRN: [id number]  ATTENDING: Martin Almaguer    HPI:  78M, PMHx MVR, PPM, s/p removal for MSSA infection, ESRD, on dialysis, Clostridium bacteremia in August (proctitis source) admitted with fever (102.2) found with high-grade sustained MRSA bacteremia.  On 10/6/2023 patient had permacath removed.  Brought spectrum antibiotic coverage.  Not a candidate for surgical valve removal.  Patient is poor historian.  Hematology consulted for anemia and thrombocytopenia.    MEDICATIONS:  acetaminophen     Tablet .. 650 milliGRAM(s) Oral every 6 hours PRN  aluminum hydroxide/magnesium hydroxide/simethicone Suspension 10 milliLiter(s) Oral every 6 hours PRN  aspirin  chewable 81 milliGRAM(s) Oral daily  ceftaroline fosamil IVPB 200 milliGRAM(s) IV Intermittent once  ceftaroline fosamil IVPB      ceftaroline fosamil IVPB 200 milliGRAM(s) IV Intermittent every 8 hours  chlorhexidine 2% Cloths 1 Application(s) Topical <User Schedule>  epoetin val (PROCRIT) Injectable 88736 Unit(s) IV Push <User Schedule>  folic acid 1 milliGRAM(s) Oral daily  heparin   Injectable 5000 Unit(s) SubCutaneous every 12 hours  hydrocortisone hemorrhoidal Suppository 1 Suppository(s) Rectal two times a day  influenza  Vaccine (HIGH DOSE) 0.7 milliLiter(s) IntraMuscular once  levothyroxine 137 MICROGram(s) Oral daily  mesalamine Suppository 1000 milliGRAM(s) Rectal at bedtime  midodrine 10 milliGRAM(s) Oral every 8 hours  pantoprazole   Suspension 40 milliGRAM(s) Enteral Tube daily  polyethylene glycol 3350 17 Gram(s) Oral daily  senna 2 Tablet(s) Oral at bedtime  sodium chloride 0.9% lock flush 10 milliLiter(s) IV Push every 1 hour PRN  vancomycin  IVPB 750 milliGRAM(s) IV Intermittent <User Schedule>    All other medications reviewed.    SUBJECTIVE:  Somnolent; no change in overall status.  Girlfriend, Surekha, at bedside.  Pending hospice evaluation.    VITALS:  T(C): 36.6 (10-09-23 @ 12:30), Max: 37.7 (10-08-23 @ 21:19)  T(F): 97.9 (10-09-23 @ 12:30), Max: 99.9 (10-08-23 @ 21:19)  HR: 93 (10-09-23 @ 13:07) (74 - 122)  BP: 89/52 (10-09-23 @ 13:07) (89/52 - 127/66)      PHYSICAL EXAM:  Constitutional: lethargic  HEENT: normocephalic, anicteric sclerae, no mucositis or thrush  Respiratory: bilateral clear to auscultation anteriorly  Cardiovascular : S1, S2 regular, rhythmic, no murmurs, gallops or rubs  Abdomen: soft, distended, + normoactive BS, no palpable HS- megaly  Extremities: no tenderness;  -c/c/e, pulses equal bilaterally    LABS:  (10-09) WBC: 18.06 K/uL,Hemoglobin: 7.8 g/dL, Hematocrit: 22.2 %,  Platelet: 111 K/uL  (10-09) Na: 134 mmol/L ; K: 4.1 mmol/L ; BUN: 82 mg/dL ; Cr: 5.27 mg/dL.    RADIOLOGY:  ACC: [id number] EXAM:  CT BRAIN   ORDERED BY: JULIA CRUZ     PROCEDURE DATE:  10/04/2023          INTERPRETATION:  CLINICAL INFORMATION: Altered mental status.    TECHNIQUE:  Noncontrast axial CT images were acquired through the head.  Sagittal and coronal reformats were performed.    COMPARISON STUDY: None    FINDINGS:  There is no CT evidence of acute intracranial hemorrhage, mass effect or   midline shift.  There is no acute loss of gray matter-white matter   differentiation.    Slight prominence of the ventricles is compatible with mild age-related   involutional changes.  No clinically significant chronic microvascular   ischemic disease or other white matter pathology is visualized by CT   technique.      The paranasal sinuses and mastoids are grossly clear.    The patient is status post intraocular lens replacement bilaterally..    The calvarium and skull base appear within normal limits.    IMPRESSION:  No CT evidence of acute intracranial pathology.     JANEE ARCEO, 78y Male  MRN: [id number]  ATTENDING: Martin Almaguer    HPI:  78M, PMHx MVR, PPM, s/p removal for MSSA infection, ESRD, on dialysis, Clostridium bacteremia in August (proctitis source) admitted with fever (102.2) found with high-grade sustained MRSA bacteremia.  On 10/6/2023 patient had permacath removed.  Brought spectrum antibiotic coverage.  Not a candidate for surgical valve removal.  Patient is poor historian.  Hematology consulted for anemia and thrombocytopenia.    MEDICATIONS:  acetaminophen     Tablet .. 650 milliGRAM(s) Oral every 6 hours PRN  aluminum hydroxide/magnesium hydroxide/simethicone Suspension 10 milliLiter(s) Oral every 6 hours PRN  aspirin  chewable 81 milliGRAM(s) Oral daily  ceftaroline fosamil IVPB 200 milliGRAM(s) IV Intermittent once  ceftaroline fosamil IVPB      ceftaroline fosamil IVPB 200 milliGRAM(s) IV Intermittent every 8 hours  chlorhexidine 2% Cloths 1 Application(s) Topical <User Schedule>  epoetin val (PROCRIT) Injectable 28546 Unit(s) IV Push <User Schedule>  folic acid 1 milliGRAM(s) Oral daily  heparin   Injectable 5000 Unit(s) SubCutaneous every 12 hours  hydrocortisone hemorrhoidal Suppository 1 Suppository(s) Rectal two times a day  influenza  Vaccine (HIGH DOSE) 0.7 milliLiter(s) IntraMuscular once  levothyroxine 137 MICROGram(s) Oral daily  mesalamine Suppository 1000 milliGRAM(s) Rectal at bedtime  midodrine 10 milliGRAM(s) Oral every 8 hours  pantoprazole   Suspension 40 milliGRAM(s) Enteral Tube daily  polyethylene glycol 3350 17 Gram(s) Oral daily  senna 2 Tablet(s) Oral at bedtime  sodium chloride 0.9% lock flush 10 milliLiter(s) IV Push every 1 hour PRN  vancomycin  IVPB 750 milliGRAM(s) IV Intermittent <User Schedule>    All other medications reviewed.    SUBJECTIVE:  Somnolent; no change in overall status.  Girlfriend, Surekha, at bedside.  Pending hospice evaluation.    VITALS:  T(C): 36.6 (10-09-23 @ 12:30), Max: 37.7 (10-08-23 @ 21:19)  T(F): 97.9 (10-09-23 @ 12:30), Max: 99.9 (10-08-23 @ 21:19)  HR: 93 (10-09-23 @ 13:07) (74 - 122)  BP: 89/52 (10-09-23 @ 13:07) (89/52 - 127/66)      PHYSICAL EXAM:  Constitutional: lethargic  HEENT: normocephalic, anicteric sclerae, no mucositis or thrush  Respiratory: bilateral clear to auscultation anteriorly  Cardiovascular : S1, S2 regular, rhythmic, no murmurs, gallops or rubs  Abdomen: soft, distended, + normoactive BS, no palpable HS- megaly  Extremities: no tenderness;  -c/c/e, pulses equal bilaterally    LABS:  (10-09) WBC: 18.06 K/uL,Hemoglobin: 7.8 g/dL, Hematocrit: 22.2 %,  Platelet: 111 K/uL  (10-09) Na: 134 mmol/L ; K: 4.1 mmol/L ; BUN: 82 mg/dL ; Cr: 5.27 mg/dL.    RADIOLOGY:  ACC: [id number] EXAM:  CT BRAIN   ORDERED BY: JULIA CRUZ     PROCEDURE DATE:  10/04/2023          INTERPRETATION:  CLINICAL INFORMATION: Altered mental status.    TECHNIQUE:  Noncontrast axial CT images were acquired through the head.  Sagittal and coronal reformats were performed.    COMPARISON STUDY: None    FINDINGS:  There is no CT evidence of acute intracranial hemorrhage, mass effect or   midline shift.  There is no acute loss of gray matter-white matter   differentiation.    Slight prominence of the ventricles is compatible with mild age-related   involutional changes.  No clinically significant chronic microvascular   ischemic disease or other white matter pathology is visualized by CT   technique.      The paranasal sinuses and mastoids are grossly clear.    The patient is status post intraocular lens replacement bilaterally..    The calvarium and skull base appear within normal limits.    IMPRESSION:  No CT evidence of acute intracranial pathology.     JANEE ARCEO, 78y Male  MRN: [id number]  ATTENDING: Martin Almaguer    HPI:  78M, PMHx MVR, PPM, s/p removal for MSSA infection, ESRD, on dialysis, Clostridium bacteremia in August (proctitis source) admitted with fever (102.2) found with high-grade sustained MRSA bacteremia.  On 10/6/2023 patient had permacath removed.  Brought spectrum antibiotic coverage.  Not a candidate for surgical valve removal.  Patient is poor historian.  Hematology consulted for anemia and thrombocytopenia.    MEDICATIONS:  acetaminophen     Tablet .. 650 milliGRAM(s) Oral every 6 hours PRN  aluminum hydroxide/magnesium hydroxide/simethicone Suspension 10 milliLiter(s) Oral every 6 hours PRN  aspirin  chewable 81 milliGRAM(s) Oral daily  ceftaroline fosamil IVPB 200 milliGRAM(s) IV Intermittent once  ceftaroline fosamil IVPB      ceftaroline fosamil IVPB 200 milliGRAM(s) IV Intermittent every 8 hours  chlorhexidine 2% Cloths 1 Application(s) Topical <User Schedule>  epoetin val (PROCRIT) Injectable 86911 Unit(s) IV Push <User Schedule>  folic acid 1 milliGRAM(s) Oral daily  heparin   Injectable 5000 Unit(s) SubCutaneous every 12 hours  hydrocortisone hemorrhoidal Suppository 1 Suppository(s) Rectal two times a day  influenza  Vaccine (HIGH DOSE) 0.7 milliLiter(s) IntraMuscular once  levothyroxine 137 MICROGram(s) Oral daily  mesalamine Suppository 1000 milliGRAM(s) Rectal at bedtime  midodrine 10 milliGRAM(s) Oral every 8 hours  pantoprazole   Suspension 40 milliGRAM(s) Enteral Tube daily  polyethylene glycol 3350 17 Gram(s) Oral daily  senna 2 Tablet(s) Oral at bedtime  sodium chloride 0.9% lock flush 10 milliLiter(s) IV Push every 1 hour PRN  vancomycin  IVPB 750 milliGRAM(s) IV Intermittent <User Schedule>    All other medications reviewed.    SUBJECTIVE:  Somnolent; no change in overall status.  Girlfriend, Surekha, at bedside.  Pending hospice evaluation.    VITALS:  T(C): 36.6 (10-09-23 @ 12:30), Max: 37.7 (10-08-23 @ 21:19)  T(F): 97.9 (10-09-23 @ 12:30), Max: 99.9 (10-08-23 @ 21:19)  HR: 93 (10-09-23 @ 13:07) (74 - 122)  BP: 89/52 (10-09-23 @ 13:07) (89/52 - 127/66)      PHYSICAL EXAM:  Constitutional: lethargic  HEENT: normocephalic, anicteric sclerae, no mucositis or thrush  Respiratory: bilateral clear to auscultation anteriorly  Cardiovascular : S1, S2 regular, rhythmic, no murmurs, gallops or rubs  Abdomen: soft, distended, + normoactive BS, no palpable HS- megaly  Extremities: no tenderness;  -c/c/e, pulses equal bilaterally    LABS:  (10-09) WBC: 18.06 K/uL,Hemoglobin: 7.8 g/dL, Hematocrit: 22.2 %,  Platelet: 111 K/uL  (10-09) Na: 134 mmol/L ; K: 4.1 mmol/L ; BUN: 82 mg/dL ; Cr: 5.27 mg/dL.    RADIOLOGY:  ACC: [id number] EXAM:  CT BRAIN   ORDERED BY: JULIA CRUZ     PROCEDURE DATE:  10/04/2023          INTERPRETATION:  CLINICAL INFORMATION: Altered mental status.    TECHNIQUE:  Noncontrast axial CT images were acquired through the head.  Sagittal and coronal reformats were performed.    COMPARISON STUDY: None    FINDINGS:  There is no CT evidence of acute intracranial hemorrhage, mass effect or   midline shift.  There is no acute loss of gray matter-white matter   differentiation.    Slight prominence of the ventricles is compatible with mild age-related   involutional changes.  No clinically significant chronic microvascular   ischemic disease or other white matter pathology is visualized by CT   technique.      The paranasal sinuses and mastoids are grossly clear.    The patient is status post intraocular lens replacement bilaterally..    The calvarium and skull base appear within normal limits.    IMPRESSION:  No CT evidence of acute intracranial pathology.

## 2023-10-17 NOTE — PROGRESS NOTE ADULT - SUBJECTIVE AND OBJECTIVE BOX
Resting, on RA, AO, reports moderate UO    Vital Signs Last 24 Hrs  T(C): 36.3 (10-17-23 @ 20:12), Max: 36.4 (10-17-23 @ 05:15)  T(F): 97.4 (10-17-23 @ 20:12), Max: 97.6 (10-17-23 @ 05:15)  HR: 92 (10-17-23 @ 20:12) (77 - 92)  BP: 108/52 (10-17-23 @ 20:12) (97/53 - 108/52)  RR: 18 (10-17-23 @ 20:12) (18 - 19)  SpO2: 95% (10-17-23 @ 20:12) (94% - 95%)    I&O's Detail    16 Oct 2023 07:01  -  17 Oct 2023 07:00  --------------------------------------------------------  IN:    Oral Fluid: 320 mL  Total IN: 320 mL    OUT:    Voided (mL): 300 mL  Total OUT: 300 mL    17 Oct 2023 07:01  -  17 Oct 2023 20:59  --------------------------------------------------------  IN:    Oral Fluid: 200 mL  Total IN: 200 mL    OUT:    Voided (mL): 300 mL  Total OUT: 300 mL    s1s2  b/l air entry  soft, ND  sm edema, LUE AVF                                                                             acetaminophen     Tablet .. 650 milliGRAM(s) Oral every 6 hours PRN  chlorhexidine 2% Cloths 1 Application(s) Topical <User Schedule>  folic acid 1 milliGRAM(s) Oral daily  hydrocortisone hemorrhoidal Suppository 1 Suppository(s) Rectal two times a day  influenza  Vaccine (HIGH DOSE) 0.7 milliLiter(s) IntraMuscular once  polyethylene glycol 3350 17 Gram(s) Oral daily  sodium chloride 0.9% lock flush 10 milliLiter(s) IV Push every 1 hour PRN  traMADol 50 milliGRAM(s) Oral every 12 hours PRN    A/P:    Hx ESRD on HD at [facility] MWF via perm cath  Hx Afib, CM, EF 45 - 50%  LUE AVF, not yet mature   Adm 9/30/23 w/MRSA bacteremia, persistent (Bld Cx po 9/30 - 10/11/23)  Perm cath d/c-d 10/2/23  Cath cx pos for MRSA  Endocarditis is suspected, however pt is determined not to be a surgical candidate   Overall options are limited and prognosis is poor  Palliative f/u appr  No further HD, no blood work, no Abx  Temp HD cath d/c-d  For home hospice    [phone] Resting, on RA, AO, reports moderate UO    Vital Signs Last 24 Hrs  T(C): 36.3 (10-17-23 @ 20:12), Max: 36.4 (10-17-23 @ 05:15)  T(F): 97.4 (10-17-23 @ 20:12), Max: 97.6 (10-17-23 @ 05:15)  HR: 92 (10-17-23 @ 20:12) (77 - 92)  BP: 108/52 (10-17-23 @ 20:12) (97/53 - 108/52)  RR: 18 (10-17-23 @ 20:12) (18 - 19)  SpO2: 95% (10-17-23 @ 20:12) (94% - 95%)    I&O's Detail    16 Oct 2023 07:01  -  17 Oct 2023 07:00  --------------------------------------------------------  IN:    Oral Fluid: 320 mL  Total IN: 320 mL    OUT:    Voided (mL): 300 mL  Total OUT: 300 mL    17 Oct 2023 07:01  -  17 Oct 2023 20:59  --------------------------------------------------------  IN:    Oral Fluid: 200 mL  Total IN: 200 mL    OUT:    Voided (mL): 300 mL  Total OUT: 300 mL    s1s2  b/l air entry  soft, ND  sm edema, LUE AVF                                                                             acetaminophen     Tablet .. 650 milliGRAM(s) Oral every 6 hours PRN  chlorhexidine 2% Cloths 1 Application(s) Topical <User Schedule>  folic acid 1 milliGRAM(s) Oral daily  hydrocortisone hemorrhoidal Suppository 1 Suppository(s) Rectal two times a day  influenza  Vaccine (HIGH DOSE) 0.7 milliLiter(s) IntraMuscular once  polyethylene glycol 3350 17 Gram(s) Oral daily  sodium chloride 0.9% lock flush 10 milliLiter(s) IV Push every 1 hour PRN  traMADol 50 milliGRAM(s) Oral every 12 hours PRN    A/P:    Hx ESRD on HD at [facility] MWF via perm cath  Hx Afib, CM, EF 45 - 50%  LUE AVF, not yet mature   Adm 9/30/23 w/MRSA bacteremia, persistent (Bld Cx po 9/30 - 10/11/23)  Perm cath d/c-d 10/2/23  Cath cx pos for MRSA  Endocarditis is suspected, however pt is determined not to be a surgical candidate   Overall options are limited and prognosis is poor  Palliative f/u appr  No further HD, no blood work, no Abx  Temp HD cath d/c-d  For home hospice    [phone] Resting, on RA, AO, reports moderate UO    Vital Signs Last 24 Hrs  T(C): 36.3 (10-17-23 @ 20:12), Max: 36.4 (10-17-23 @ 05:15)  T(F): 97.4 (10-17-23 @ 20:12), Max: 97.6 (10-17-23 @ 05:15)  HR: 92 (10-17-23 @ 20:12) (77 - 92)  BP: 108/52 (10-17-23 @ 20:12) (97/53 - 108/52)  RR: 18 (10-17-23 @ 20:12) (18 - 19)  SpO2: 95% (10-17-23 @ 20:12) (94% - 95%)    I&O's Detail    16 Oct 2023 07:01  -  17 Oct 2023 07:00  --------------------------------------------------------  IN:    Oral Fluid: 320 mL  Total IN: 320 mL    OUT:    Voided (mL): 300 mL  Total OUT: 300 mL    17 Oct 2023 07:01  -  17 Oct 2023 20:59  --------------------------------------------------------  IN:    Oral Fluid: 200 mL  Total IN: 200 mL    OUT:    Voided (mL): 300 mL  Total OUT: 300 mL    s1s2  b/l air entry  soft, ND  sm edema, LUE AVF                                                                             acetaminophen     Tablet .. 650 milliGRAM(s) Oral every 6 hours PRN  chlorhexidine 2% Cloths 1 Application(s) Topical <User Schedule>  folic acid 1 milliGRAM(s) Oral daily  hydrocortisone hemorrhoidal Suppository 1 Suppository(s) Rectal two times a day  influenza  Vaccine (HIGH DOSE) 0.7 milliLiter(s) IntraMuscular once  polyethylene glycol 3350 17 Gram(s) Oral daily  sodium chloride 0.9% lock flush 10 milliLiter(s) IV Push every 1 hour PRN  traMADol 50 milliGRAM(s) Oral every 12 hours PRN    A/P:    Hx ESRD on HD at [facility] MWF via perm cath  Hx Afib, CM, EF 45 - 50%  LUE AVF, not yet mature   Adm 9/30/23 w/MRSA bacteremia, persistent (Bld Cx po 9/30 - 10/11/23)  Perm cath d/c-d 10/2/23  Cath cx pos for MRSA  Endocarditis is suspected, however pt is determined not to be a surgical candidate   Overall options are limited and prognosis is poor  Palliative f/u appr  No further HD, no blood work, no Abx  Temp HD cath d/c-d  For home hospice    [phone]

## 2023-10-18 ENCOUNTER — TRANSCRIPTION ENCOUNTER (OUTPATIENT)
Age: 78
End: 2023-10-18

## 2023-10-18 VITALS
DIASTOLIC BLOOD PRESSURE: 53 MMHG | SYSTOLIC BLOOD PRESSURE: 114 MMHG | HEART RATE: 93 BPM | RESPIRATION RATE: 18 BRPM | OXYGEN SATURATION: 94 % | TEMPERATURE: 97 F

## 2023-10-18 PROCEDURE — 99232 SBSQ HOSP IP/OBS MODERATE 35: CPT

## 2023-10-18 PROCEDURE — 99239 HOSP IP/OBS DSCHRG MGMT >30: CPT

## 2023-10-18 RX ADMIN — CHLORHEXIDINE GLUCONATE 1 APPLICATION(S): 213 SOLUTION TOPICAL at 05:32

## 2023-10-18 NOTE — DISCHARGE NOTE PROVIDER - NSDCMRMEDTOKEN_GEN_ALL_CORE_FT
aluminum hydroxide-magnesium hydroxide 200 mg-200 mg/5 mL oral suspension: 10 milliliter(s) orally every 6 hours as needed for dyspepsia  apixaban 5 mg oral tablet: 1 tab(s) orally 2 times a day  aspirin 81 mg oral tablet: 1 tab(s) orally once a day  clotrimazole 1% topical cream: Apply topically to affected area 2 times a day  D3 50 mcg (2000 intl units) oral capsule: 1 cap(s) orally once a day  erythromycin 0.5% ophthalmic ointment: 1 application in each affected eye 2 times a day  ferrous sulfate 324 mg (65 mg elemental iron) oral tablet: 1 tab(s) orally once a day  folic acid 1 mg oral tablet: 1 tab(s) orally once a day  hydrocortisone 25 mg rectal suppository: 1 suppository(ies) rectally 2 times a day  mesalamine 1000 mg rectal suppository: 1 suppository(ies) rectally once a day (at bedtime)  metoprolol succinate 100 mg oral tablet, extended release: 1 tab(s) orally once a day  midodrine 10 mg oral tablet: 2 tab(s) orally 3 times a week on monday wednesday friday before HD  nystatin 100,000 units/g topical powder: Apply topically to affected area 3 times a day  pantoprazole 40 mg oral delayed release tablet: 1 tab(s) orally once a day  polyethylene glycol 3350 oral powder for reconstitution: 17 gram(s) orally once a day  polymyxin B-trimethoprim 10,000 units-1 mg/mL ophthalmic solution: 1 drop(s) in each affected eye 4 times a day  senna leaf extract oral tablet: 2 tab(s) orally once a day (at bedtime)  Synthroid 137 mcg (0.137 mg) oral tablet: 1 tab(s) orally once a day

## 2023-10-18 NOTE — DISCHARGE NOTE PROVIDER - HOSPITAL COURSE
The patient is a 78-year-old male with a past medical history significant for ESRD on dialysis M/W/F, atrial fibrillation (on Eliquis), CAD s/p PCI, a prosthetic mitral valve replacement, a history of proctitis, hemorrhoids, and hypothyroidism. His current admission began on September 30, 2023, when he presented to the Emergency Department (ED) with complaints of fatigue, a fever of 102.2°F, cough, and wheezing. The initial assessment noted hypotension in the 80s systolic, and he received a 1-liter normal saline bolus. The patient was also found to be confused and had difficulty providing a detailed medical history. Pt remained hypotensive after NS bolus and required initiation of levophed to maintain MAP>65. Patient also started on midodrine to maintain BP.     Pt was given vancomycin and zosyn. BCx drawn showed MRSA. Per ID abx narrowed to vancomycin, Surgery consulted to remove permacath. 10/1 covered broadly on vancomycin, meropenem, caspofungin. 10/8 changed to ceftaroline and vancomycin. 10/9 vancomycin changed to daptomycin.     Tunnel cath removed 10/3, and rt IJ Shiley placed 10/4 and hemodialysis resumed.     Patient was thrombocytopenic suspected from sepsis but improved not requiring platelet transfusion.     The patient was eventually downgraded from the ICU to the general medical floor (3E) as his condition stabilized. The primary concerns were his altered mental status, sepsis, and persistent MRSA bacteremia, which required ongoing management and close monitoring.    Patient continued to be bacteremic at this time despite antibiotic treatment. Repeat cultures continued to be positive for MRSA. TTE showed probable vegetations on posterior mitral valve annulus, however patient is not a candidate for valvular surgery. Prognosis was determined to be poor, and with discussion with girlfriend and HCP Surekha, patient was transitioned to comfort care only. Patient's IJ Shiley was removed 10/15 and antibiotics were discontinued. After further discussion with patient and HCP, patient wants to go home with hospice care. The patient is a 78-year-old male with a past medical history significant for ESRD on dialysis M/W/F, atrial fibrillation (on Eliquis), CAD s/p PCI, a prosthetic mitral valve replacement, a history of proctitis, hemorrhoids, and hypothyroidism. His current admission began on September 30, 2023, when he presented to the Emergency Department (ED) with complaints of fatigue, a fever of 102.2°F, cough, and wheezing. The initial assessment noted hypotension in the 80s systolic, and he received a 1-liter normal saline bolus. The patient was also found to be confused and had difficulty providing a detailed medical history. Pt remained hypotensive after NS bolus and required initiation of levophed to maintain MAP>65. Patient also started on midodrine to maintain BP.     Pt was given vancomycin and zosyn. BCx drawn showed MRSA. Per ID abx narrowed to vancomycin, Surgery consulted to remove permacath. 10/1 covered broadly on vancomycin, meropenem, caspofungin. 10/8 changed to ceftaroline and vancomycin. 10/9 vancomycin changed to daptomycin.     Tunnel cath removed 10/3, and rt IJ Shiley placed 10/4 and hemodialysis resumed.     Patient was thrombocytopenic suspected from sepsis but improved not requiring platelet transfusion.     The patient was eventually downgraded from the ICU to the general medical floor (3E) as his condition stabilized. The primary concerns were his altered mental status, sepsis, and persistent MRSA bacteremia, which required ongoing management and close monitoring.    Patient continued to be bacteremic at this time despite antibiotic treatment. Repeat cultures continued to be positive for MRSA. TTE showed probable vegetations on posterior mitral valve annulus, however patient is not a candidate for valvular surgery. Prognosis was determined to be poor, and with discussion with girlfriend and HCP Surekha, patient was transitioned to comfort care only. Patient's IJ Shiley was removed 10/15 and antibiotics were discontinued. After further discussion with patient and HCP, patient wants to go home with hospice care.      Vital Signs Last 24 Hrs  T(C): 36.3 (18 Oct 2023 05:16), Max: 36.3 (17 Oct 2023 20:12)  T(F): 97.3 (18 Oct 2023 05:16), Max: 97.4 (17 Oct 2023 20:12)  HR: 99 (18 Oct 2023 05:16) (92 - 99)  BP: 115/63 (18 Oct 2023 05:16) (108/52 - 115/63)  BP(mean): 80 (18 Oct 2023 05:16) (80 - 80)  RR: 16 (18 Oct 2023 05:16) (16 - 18)  SpO2: 94% (18 Oct 2023 05:16) (94% - 95%)    Parameters below as of 18 Oct 2023 05:16  Patient On (Oxygen Delivery Method): room air      I&O's Summary    17 Oct 2023 07:01  -  18 Oct 2023 07:00  --------------------------------------------------------  IN: 200 mL / OUT: 300 mL / NET: -100 mL      PHYSICAL EXAM:  GENERAL: NAD  HEENT:  AT/NC, moist mucous membranes  CHEST/LUNG:  CTA b/l, no rales, wheezes, or rhonchi,  normal respiratory effort, no intercostal retractions  HEART:  irregular HR, S1, S2  ABDOMEN:  BS+, soft, nontender, nondistended  MSK/EXTREMITIES: 2+ peripheral pulses  NERVOUS SYSTEM: awake, alert    You were admitted for septic shock.  You were diagnosed with sepsis 2/2 MRSA bacteremia.  You were treated with antibiotics - vancomycin, zosyn, meropenem, caspofungin, ceftaroline, daptomycin      You will need to follow up with your primary care physician.      Palliative Care / Advanced Care Planning  Code Status: DNR/DNI  Patient/Family agreeable to Hospice/Palliative (Y/N)? Y  Summary of Goals of Care Conversation: Discussed with healthcare proxy Surekha who expressed understanding of patient's prognosis and agreed to comfort measures only and home hospice.    Discharging Provider:  Isai Clemente MD  Contact Info: [phone]  Please call with any questions or concerns.     The patient is a 78-year-old male with a past medical history significant for ESRD on dialysis M/W/F, atrial fibrillation (on Eliquis), CAD s/p PCI, a prosthetic mitral valve replacement, a history of proctitis, hemorrhoids, and hypothyroidism. His current admission began on September 30, 2023, when he presented to the Emergency Department (ED) with complaints of fatigue, a fever of 102.2°F, cough, and wheezing. The initial assessment noted hypotension in the 80s systolic, and he received a 1-liter normal saline bolus. The patient was also found to be confused and had difficulty providing a detailed medical history. Pt remained hypotensive after NS bolus and required initiation of levophed to maintain MAP>65. Patient also started on midodrine to maintain BP.     Pt was given vancomycin and zosyn. BCx drawn showed MRSA. Per ID abx narrowed to vancomycin, Surgery consulted to remove permacath. 10/1 covered broadly on vancomycin, meropenem, caspofungin. 10/8 changed to ceftaroline and vancomycin. 10/9 vancomycin changed to daptomycin.     Tunnel cath removed 10/3, and rt IJ Shiley placed 10/4 and hemodialysis resumed.     Patient was thrombocytopenic suspected from sepsis but improved not requiring platelet transfusion.     The patient was eventually downgraded from the ICU to the general medical floor (3E) as his condition stabilized. The primary concerns were his altered mental status, sepsis, and persistent MRSA bacteremia, which required ongoing management and close monitoring.    Patient continued to be bacteremic at this time despite antibiotic treatment. Repeat cultures continued to be positive for MRSA. TTE showed probable vegetations on posterior mitral valve annulus, however patient is not a candidate for valvular surgery. Prognosis was determined to be poor, and with discussion with girlfriend and HCP Surekha, patient was transitioned to comfort care only. Patient's IJ Shiley was removed 10/15 and antibiotics were discontinued. After further discussion with patient and HCP, patient wants to go home with hospice care.      Vital Signs Last 24 Hrs  T(C): 36.3 (18 Oct 2023 05:16), Max: 36.3 (17 Oct 2023 20:12)  T(F): 97.3 (18 Oct 2023 05:16), Max: 97.4 (17 Oct 2023 20:12)  HR: 99 (18 Oct 2023 05:16) (92 - 99)  BP: 115/63 (18 Oct 2023 05:16) (108/52 - 115/63)  BP(mean): 80 (18 Oct 2023 05:16) (80 - 80)  RR: 16 (18 Oct 2023 05:16) (16 - 18)  SpO2: 94% (18 Oct 2023 05:16) (94% - 95%)    Parameters below as of 18 Oct 2023 05:16  Patient On (Oxygen Delivery Method): room air      I&O's Summary    17 Oct 2023 07:01  -  18 Oct 2023 07:00  --------------------------------------------------------  IN: 200 mL / OUT: 300 mL / NET: -100 mL      PHYSICAL EXAM:  GENERAL: NAD  HEENT:  AT/NC, moist mucous membranes  CHEST/LUNG:  CTA b/l, no rales, wheezes, or rhonchi,  normal respiratory effort, no intercostal retractions  HEART:  irregular HR, S1, S2  ABDOMEN:  BS+, soft, nontender, nondistended  MSK/EXTREMITIES: 2+ peripheral pulses  NERVOUS SYSTEM: awake, alert    You were admitted for septic shock.  You were diagnosed with sepsis 2/2 MRSA bacteremia.  You were treated with antibiotics - vancomycin, zosyn, meropenem, caspofungin, ceftaroline, daptomycin      You will need to follow up with your primary care physician.      Palliative Care / Advanced Care Planning  Code Status: DNR/DNI  Patient/Family agreeable to Hospice/Palliative (Y/N)? Y  Summary of Goals of Care Conversation: Discussed with healthcare proxy Surekha who expressed understanding of patient's prognosis and agreed to comfort measures only and home hospice.    Discharging Provider:  Isai Clemente MD  Contact Info: [phone]  Please call with any questions or concerns.     The patient is a 78-year-old male with a past medical history significant for ESRD on dialysis M/W/F, atrial fibrillation (on Eliquis), CAD s/p PCI, a prosthetic mitral valve replacement, a history of proctitis, hemorrhoids, and hypothyroidism. His current admission began on September 30, 2023, when he presented to the Emergency Department (ED) with complaints of fatigue, a fever of 102.2°F, cough, and wheezing. The initial assessment noted hypotension in the 80s systolic, and he received a 1-liter normal saline bolus. The patient was also found to be confused and had difficulty providing a detailed medical history. Pt remained hypotensive after NS bolus and required initiation of levophed to maintain MAP>65. Patient also started on midodrine to maintain BP.     Pt was given vancomycin and zosyn. BCx drawn showed MRSA. Per ID abx narrowed to vancomycin, Surgery consulted to remove permacath. 10/1 covered broadly on vancomycin, meropenem, caspofungin. 10/8 changed to ceftaroline and vancomycin. 10/9 vancomycin changed to daptomycin.     Tunnel cath removed 10/3, and rt IJ Shiley placed 10/4 and hemodialysis resumed.     Patient was thrombocytopenic suspected from sepsis but improved not requiring platelet transfusion.     The patient was eventually downgraded from the ICU to the general medical floor (3E) as his condition stabilized. The primary concerns were his altered mental status, sepsis, and persistent MRSA bacteremia, which required ongoing management and close monitoring.    Patient continued to be bacteremic at this time despite antibiotic treatment. Repeat cultures continued to be positive for MRSA. TTE showed probable vegetations on posterior mitral valve annulus, however patient is not a candidate for valvular surgery. Prognosis was determined to be poor, and with discussion with girlfriend and HCP Surekha, patient was transitioned to comfort care only. Patient's IJ Shiley was removed 10/15 and antibiotics were discontinued. After further discussion with patient and HCP, patient wants to go home with hospice care.      Vital Signs Last 24 Hrs  T(C): 36.3 (18 Oct 2023 05:16), Max: 36.3 (17 Oct 2023 20:12)  T(F): 97.3 (18 Oct 2023 05:16), Max: 97.4 (17 Oct 2023 20:12)  HR: 99 (18 Oct 2023 05:16) (92 - 99)  BP: 115/63 (18 Oct 2023 05:16) (108/52 - 115/63)  BP(mean): 80 (18 Oct 2023 05:16) (80 - 80)  RR: 16 (18 Oct 2023 05:16) (16 - 18)  SpO2: 94% (18 Oct 2023 05:16) (94% - 95%)    Parameters below as of 18 Oct 2023 05:16  Patient On (Oxygen Delivery Method): room air      I&O's Summary    17 Oct 2023 07:01  -  18 Oct 2023 07:00  --------------------------------------------------------  IN: 200 mL / OUT: 300 mL / NET: -100 mL      PHYSICAL EXAM:  GENERAL: NAD  HEENT:  AT/NC, moist mucous membranes  CHEST/LUNG:  CTA b/l, no rales, wheezes, or rhonchi,  normal respiratory effort, no intercostal retractions  HEART:  irregular HR, S1, S2  ABDOMEN:  BS+, soft, nontender, nondistended  MSK/EXTREMITIES: 2+ peripheral pulses  NERVOUS SYSTEM: awake, alert    You were admitted for septic shock.  You were diagnosed with sepsis 2/2 MRSA bacteremia.  You were treated with antibiotics - vancomycin, zosyn, meropenem, caspofungin, ceftaroline, daptomycin      You will need to follow up with your primary care physician.      Palliative Care / Advanced Care Planning  Code Status: DNR/DNI  Patient/Family agreeable to Hospice/Palliative (Y/N)? Y  Summary of Goals of Care Conversation: Discussed with healthcare proxy Surekha who expressed understanding of patient's prognosis and agreed to comfort measures only and home hospice.    Discharging Provider:  Isai Clemente MD  Contact Info: [phone]  Please call with any questions or concerns.

## 2023-10-18 NOTE — DISCHARGE NOTE NURSING/CASE MANAGEMENT/SOCIAL WORK - PATIENT PORTAL LINK FT
You can access the FollowMyHealth Patient Portal offered by [facility] by registering at the following website: [email or web address]. By joining [facility]’s FollowMyHealth portal, you will also be able to view your health information using other applications (apps) compatible with our system. You can access the FollowMyHealth Patient Portal offered by [facility] by registering at the following website: [email or web address]. By joining [facility]’s FollowMyHealth portal, you will also be able to view your health information using other applications (apps) compatible with our system. You can access the FollowMyHealth Patient Portal offered by Health system by registering at the following website: [email or web address]. By joining [facility]’s FollowMyHealth portal, you will also be able to view your health information using other applications (apps) compatible with our system.

## 2023-10-18 NOTE — PROGRESS NOTE ADULT - PROBLEM SELECTOR PLAN 1
Persistent MRSA bacteremia, mitral valve vegetations.  ID recommendation consistent with no further antibiotic coverage.  Transition to comfort care only.  Awaiting decision on transitioning to hospice care at outside facility.  Hematologic profile stable.  Patient is DNR.

## 2023-10-18 NOTE — DISCHARGE NOTE PROVIDER - ATTENDING DISCHARGE PHYSICAL EXAMINATION:
General: NAD ill appearing elderly male  Heart: RRR + Murmur  Lungs: Decreased breath sounds, no wheezing nonlabored breathing

## 2023-10-18 NOTE — CHART NOTE - NSCHARTNOTESELECT_GEN_ALL_CORE
Event Note
Nutrition Services
[facility] TLC Removal/Event Note

## 2023-10-18 NOTE — CHART NOTE - NSCHARTNOTEFT_GEN_A_CORE
Nutrition Follow Up Note  Hospital Course (Per Electronic Medical Record):   Source: Medical Record [X]  Nursing Staff [X]     Diet: pureed Renal DASH/TLC     Patient noted with poor po intake ~ 25% , no further HD as per family wishes , no further lab , noted for Home hospice , PO Nepro supplement pending , will  request to add to diet . Limited nutrition intervention as patient noted for Hospice , will provide po diet as per patient/family wishes     Current Weight: (10/16) 214.9/97.5kg                           (10/15) 189.5/86kg       Pertinent Medications: MEDICATIONS  (STANDING):  chlorhexidine 2% Cloths 1 Application(s) Topical <User Schedule>  folic acid 1 milliGRAM(s) Oral daily  hydrocortisone hemorrhoidal Suppository 1 Suppository(s) Rectal two times a day  influenza  Vaccine (HIGH DOSE) 0.7 milliLiter(s) IntraMuscular once  polyethylene glycol 3350 17 Gram(s) Oral daily    MEDICATIONS  (PRN):  acetaminophen     Tablet .. 650 milliGRAM(s) Oral every 6 hours PRN Mild Pain (1 - 3)  sodium chloride 0.9% lock flush 10 milliLiter(s) IV Push every 1 hour PRN Pre/post blood products, medications, blood draw, and to maintain line patency  traMADol 50 milliGRAM(s) Oral every 12 hours PRN Moderate Pain (4 - 6)      Pertinent Labs:   10-12 Phos 3.8 mg/dL 10-13 Alb 1.9 g/dL<L>        Skin: sacral stage I     Edema: none    Last BM: (10/17) bowel meds noted     Estimated Needs:   [X] No Change since Previous Assessment      Previous Nutrition Diagnosis: Severe protein calorie malnutrition     Nutrition Diagnosis is [X] Ongoing         New Nutrition Diagnosis: [X] Not Applicable      Interventions:   1. suggest add Nepro BID    2 ,       Monitoring & Evaluation: will monitor:  [X] Weights   [X] PO Intake   [X] Follow Up (Per Protocol)  [X] Tolerance to Diet Prescription       RD to follow as per Nutrition protocol  Haleigh Grant RDN

## 2023-10-18 NOTE — DISCHARGE NOTE PROVIDER - PROVIDER TOKENS
PROVIDER:[TOKEN:[[id number]],FOLLOWUP:[Routine]] PROVIDER:[TOKEN:[[id number]],FOLLOWUP:[Routine]] PROVIDER:[TOKEN:[[id number]],FOLLOWUP:[Routine]]

## 2023-10-18 NOTE — DISCHARGE NOTE PROVIDER - NSDCCPCAREPLAN_GEN_ALL_CORE_FT
PRINCIPAL DISCHARGE DIAGNOSIS  Diagnosis: Sepsis with hypotension  Assessment and Plan of Treatment: You were admitted for acute sepsis suspected from an infected dialysis catheter. It caused your blood pressure to drop and you required midodrine and levophed to maintain your blood pressure as well as admission to the ICU. The catheter was removed in the OR and you had a temporary catheter placed for dialysis. You were given IV antibiotics that were adjusted several times during your hospital course to treat MRSA bacteria that was present in your bloodstream, however repeated blood cultures were still positive for MRSA despite antibiotics. Based on your prognosis and discussing with you and your healthcare proxy, we determined that continuing antibiotic treatment would not be of benefit to you and moved to maximizing your comfort.      SECONDARY DISCHARGE DIAGNOSES  Diagnosis: Sepsis with hypotension  Assessment and Plan of Treatment:

## 2023-10-18 NOTE — DISCHARGE NOTE NURSING/CASE MANAGEMENT/SOCIAL WORK - NSDCPEFALRISK_GEN_ALL_CORE
For information on Fall & Injury Prevention, visit: [email or web address]-[email or web address]-[email or web address]-mobility OR  [email or web address]://www.[email or web address]/[email or web address]-[email or web address]-tips-[email or web address]-injury OR  https://www.cdc.gov/steadi/patient.html For information on Fall & Injury Prevention, visit: https://www.[facility]/news/fall-prevention-protects-and-maintains-health-and-mobility OR  https://www.[facility]/news/fall-prevention-tips-to-avoid-injury OR  https://www.cdc.gov/steadi/patient.html For information on Fall & Injury Prevention, visit: https://www.[facility]/news/fall-prevention-protects-and-maintains-health-and-mobility OR  https://www.[facility]/news/fall-prevention-tips-to-avoid-injury OR  https://www.cdc.gov/steadi/patient.html

## 2023-10-18 NOTE — PROGRESS NOTE ADULT - PROVIDER SPECIALTY LIST ADULT
Cardiology
Critical Care
Family Medicine
Heme/Onc
Heme/Onc
Infectious Disease
Nephrology
Cardiology
Critical Care
Infectious Disease
Nephrology
Critical Care
Family Medicine
Heme/Onc
Infectious Disease
Nephrology
Surgery
Family Medicine
Palliative Care
Pulmonology
Surgery
Heme/Onc
Palliative Care
Cardiology
Cardiology
Critical Care
Family Medicine
Heme/Onc
Infectious Disease
Nephrology
Nephrology
Palliative Care
Critical Care
Critical Care
Heme/Onc
Family Medicine
Heme/Onc
Critical Care
Critical Care
Family Medicine
Family Medicine
Heme/Onc
Heme/Onc
Palliative Care
Palliative Care
Family Medicine
Family Medicine
Critical Care
Pulmonology
Heme/Onc
Palliative Care
Palliative Care

## 2023-10-18 NOTE — PROGRESS NOTE ADULT - SUBJECTIVE AND OBJECTIVE BOX
JANEE ARCEO, 78y Male  MRN: [id number]  ATTENDING: Martin Almaguer    HPI:  78M, PMHx MVR, PPM, s/p removal for MSSA infection, ESRD, on dialysis, Clostridium bacteremia in August (proctitis source) admitted with fever (102.2) found with high-grade sustained MRSA bacteremia.  On 10/6/2023 patient had permacath removed.  Brought spectrum antibiotic coverage.  Not a candidate for surgical valve removal.  Patient is poor historian.  Hematology consulted for anemia and thrombocytopenia.    MEDICATIONS:  acetaminophen     Tablet .. 650 milliGRAM(s) Oral every 6 hours PRN  aluminum hydroxide/magnesium hydroxide/simethicone Suspension 10 milliLiter(s) Oral every 6 hours PRN  aspirin  chewable 81 milliGRAM(s) Oral daily  ceftaroline fosamil IVPB 200 milliGRAM(s) IV Intermittent once  ceftaroline fosamil IVPB      ceftaroline fosamil IVPB 200 milliGRAM(s) IV Intermittent every 8 hours  chlorhexidine 2% Cloths 1 Application(s) Topical <User Schedule>  epoetin val (PROCRIT) Injectable 85404 Unit(s) IV Push <User Schedule>  folic acid 1 milliGRAM(s) Oral daily  heparin   Injectable 5000 Unit(s) SubCutaneous every 12 hours  hydrocortisone hemorrhoidal Suppository 1 Suppository(s) Rectal two times a day  influenza  Vaccine (HIGH DOSE) 0.7 milliLiter(s) IntraMuscular once  levothyroxine 137 MICROGram(s) Oral daily  mesalamine Suppository 1000 milliGRAM(s) Rectal at bedtime  midodrine 10 milliGRAM(s) Oral every 8 hours  pantoprazole   Suspension 40 milliGRAM(s) Enteral Tube daily  polyethylene glycol 3350 17 Gram(s) Oral daily  senna 2 Tablet(s) Oral at bedtime  sodium chloride 0.9% lock flush 10 milliLiter(s) IV Push every 1 hour PRN  vancomycin  IVPB 750 milliGRAM(s) IV Intermittent <User Schedule>    All other medications reviewed.    SUBJECTIVE:  Doing poorly; overall unchanged clinical status.  Awaiting disposition decision.    VITALS:  T(C): 36.6 (10-09-23 @ 12:30), Max: 37.7 (10-08-23 @ 21:19)  T(F): 97.9 (10-09-23 @ 12:30), Max: 99.9 (10-08-23 @ 21:19)  HR: 93 (10-09-23 @ 13:07) (74 - 122)  BP: 89/52 (10-09-23 @ 13:07) (89/52 - 127/66)      PHYSICAL EXAM:  Constitutional: lethargic  HEENT: normocephalic, anicteric sclerae, no mucositis or thrush  Respiratory: bilateral clear to auscultation anteriorly  Cardiovascular : S1, S2 regular, rhythmic, no murmurs, gallops or rubs  Abdomen: soft, distended, + normoactive BS, no palpable HS- megaly  Extremities: no tenderness;  -c/c/e, pulses equal bilaterally    LABS:  (10-09) WBC: 18.06 K/uL,Hemoglobin: 7.8 g/dL, Hematocrit: 22.2 %,  Platelet: 111 K/uL  (10-09) Na: 134 mmol/L ; K: 4.1 mmol/L ; BUN: 82 mg/dL ; Cr: 5.27 mg/dL.    RADIOLOGY:  ACC: [id number] EXAM:  CT BRAIN   ORDERED BY: JULIA CRUZ     PROCEDURE DATE:  10/04/2023          INTERPRETATION:  CLINICAL INFORMATION: Altered mental status.    TECHNIQUE:  Noncontrast axial CT images were acquired through the head.  Sagittal and coronal reformats were performed.    COMPARISON STUDY: None    FINDINGS:  There is no CT evidence of acute intracranial hemorrhage, mass effect or   midline shift.  There is no acute loss of gray matter-white matter   differentiation.    Slight prominence of the ventricles is compatible with mild age-related   involutional changes.  No clinically significant chronic microvascular   ischemic disease or other white matter pathology is visualized by CT   technique.      The paranasal sinuses and mastoids are grossly clear.    The patient is status post intraocular lens replacement bilaterally..    The calvarium and skull base appear within normal limits.    IMPRESSION:  No CT evidence of acute intracranial pathology.     JANEE ARCEO, 78y Male  MRN: [id number]  ATTENDING: Martin Almaguer    HPI:  78M, PMHx MVR, PPM, s/p removal for MSSA infection, ESRD, on dialysis, Clostridium bacteremia in August (proctitis source) admitted with fever (102.2) found with high-grade sustained MRSA bacteremia.  On 10/6/2023 patient had permacath removed.  Brought spectrum antibiotic coverage.  Not a candidate for surgical valve removal.  Patient is poor historian.  Hematology consulted for anemia and thrombocytopenia.    MEDICATIONS:  acetaminophen     Tablet .. 650 milliGRAM(s) Oral every 6 hours PRN  aluminum hydroxide/magnesium hydroxide/simethicone Suspension 10 milliLiter(s) Oral every 6 hours PRN  aspirin  chewable 81 milliGRAM(s) Oral daily  ceftaroline fosamil IVPB 200 milliGRAM(s) IV Intermittent once  ceftaroline fosamil IVPB      ceftaroline fosamil IVPB 200 milliGRAM(s) IV Intermittent every 8 hours  chlorhexidine 2% Cloths 1 Application(s) Topical <User Schedule>  epoetin val (PROCRIT) Injectable 29982 Unit(s) IV Push <User Schedule>  folic acid 1 milliGRAM(s) Oral daily  heparin   Injectable 5000 Unit(s) SubCutaneous every 12 hours  hydrocortisone hemorrhoidal Suppository 1 Suppository(s) Rectal two times a day  influenza  Vaccine (HIGH DOSE) 0.7 milliLiter(s) IntraMuscular once  levothyroxine 137 MICROGram(s) Oral daily  mesalamine Suppository 1000 milliGRAM(s) Rectal at bedtime  midodrine 10 milliGRAM(s) Oral every 8 hours  pantoprazole   Suspension 40 milliGRAM(s) Enteral Tube daily  polyethylene glycol 3350 17 Gram(s) Oral daily  senna 2 Tablet(s) Oral at bedtime  sodium chloride 0.9% lock flush 10 milliLiter(s) IV Push every 1 hour PRN  vancomycin  IVPB 750 milliGRAM(s) IV Intermittent <User Schedule>    All other medications reviewed.    SUBJECTIVE:  Doing poorly; overall unchanged clinical status.  Awaiting disposition decision.    VITALS:  T(C): 36.6 (10-09-23 @ 12:30), Max: 37.7 (10-08-23 @ 21:19)  T(F): 97.9 (10-09-23 @ 12:30), Max: 99.9 (10-08-23 @ 21:19)  HR: 93 (10-09-23 @ 13:07) (74 - 122)  BP: 89/52 (10-09-23 @ 13:07) (89/52 - 127/66)      PHYSICAL EXAM:  Constitutional: lethargic  HEENT: normocephalic, anicteric sclerae, no mucositis or thrush  Respiratory: bilateral clear to auscultation anteriorly  Cardiovascular : S1, S2 regular, rhythmic, no murmurs, gallops or rubs  Abdomen: soft, distended, + normoactive BS, no palpable HS- megaly  Extremities: no tenderness;  -c/c/e, pulses equal bilaterally    LABS:  (10-09) WBC: 18.06 K/uL,Hemoglobin: 7.8 g/dL, Hematocrit: 22.2 %,  Platelet: 111 K/uL  (10-09) Na: 134 mmol/L ; K: 4.1 mmol/L ; BUN: 82 mg/dL ; Cr: 5.27 mg/dL.    RADIOLOGY:  ACC: [id number] EXAM:  CT BRAIN   ORDERED BY: JULIA CRUZ     PROCEDURE DATE:  10/04/2023          INTERPRETATION:  CLINICAL INFORMATION: Altered mental status.    TECHNIQUE:  Noncontrast axial CT images were acquired through the head.  Sagittal and coronal reformats were performed.    COMPARISON STUDY: None    FINDINGS:  There is no CT evidence of acute intracranial hemorrhage, mass effect or   midline shift.  There is no acute loss of gray matter-white matter   differentiation.    Slight prominence of the ventricles is compatible with mild age-related   involutional changes.  No clinically significant chronic microvascular   ischemic disease or other white matter pathology is visualized by CT   technique.      The paranasal sinuses and mastoids are grossly clear.    The patient is status post intraocular lens replacement bilaterally..    The calvarium and skull base appear within normal limits.    IMPRESSION:  No CT evidence of acute intracranial pathology.     JANEE ARCEO, 78y Male  MRN: [id number]  ATTENDING: Martin Almaguer    HPI:  78M, PMHx MVR, PPM, s/p removal for MSSA infection, ESRD, on dialysis, Clostridium bacteremia in August (proctitis source) admitted with fever (102.2) found with high-grade sustained MRSA bacteremia.  On 10/6/2023 patient had permacath removed.  Brought spectrum antibiotic coverage.  Not a candidate for surgical valve removal.  Patient is poor historian.  Hematology consulted for anemia and thrombocytopenia.    MEDICATIONS:  acetaminophen     Tablet .. 650 milliGRAM(s) Oral every 6 hours PRN  aluminum hydroxide/magnesium hydroxide/simethicone Suspension 10 milliLiter(s) Oral every 6 hours PRN  aspirin  chewable 81 milliGRAM(s) Oral daily  ceftaroline fosamil IVPB 200 milliGRAM(s) IV Intermittent once  ceftaroline fosamil IVPB      ceftaroline fosamil IVPB 200 milliGRAM(s) IV Intermittent every 8 hours  chlorhexidine 2% Cloths 1 Application(s) Topical <User Schedule>  epoetin val (PROCRIT) Injectable 47297 Unit(s) IV Push <User Schedule>  folic acid 1 milliGRAM(s) Oral daily  heparin   Injectable 5000 Unit(s) SubCutaneous every 12 hours  hydrocortisone hemorrhoidal Suppository 1 Suppository(s) Rectal two times a day  influenza  Vaccine (HIGH DOSE) 0.7 milliLiter(s) IntraMuscular once  levothyroxine 137 MICROGram(s) Oral daily  mesalamine Suppository 1000 milliGRAM(s) Rectal at bedtime  midodrine 10 milliGRAM(s) Oral every 8 hours  pantoprazole   Suspension 40 milliGRAM(s) Enteral Tube daily  polyethylene glycol 3350 17 Gram(s) Oral daily  senna 2 Tablet(s) Oral at bedtime  sodium chloride 0.9% lock flush 10 milliLiter(s) IV Push every 1 hour PRN  vancomycin  IVPB 750 milliGRAM(s) IV Intermittent <User Schedule>    All other medications reviewed.    SUBJECTIVE:  Doing poorly; overall unchanged clinical status.  Awaiting disposition decision.    VITALS:  T(C): 36.6 (10-09-23 @ 12:30), Max: 37.7 (10-08-23 @ 21:19)  T(F): 97.9 (10-09-23 @ 12:30), Max: 99.9 (10-08-23 @ 21:19)  HR: 93 (10-09-23 @ 13:07) (74 - 122)  BP: 89/52 (10-09-23 @ 13:07) (89/52 - 127/66)      PHYSICAL EXAM:  Constitutional: lethargic  HEENT: normocephalic, anicteric sclerae, no mucositis or thrush  Respiratory: bilateral clear to auscultation anteriorly  Cardiovascular : S1, S2 regular, rhythmic, no murmurs, gallops or rubs  Abdomen: soft, distended, + normoactive BS, no palpable HS- megaly  Extremities: no tenderness;  -c/c/e, pulses equal bilaterally    LABS:  (10-09) WBC: 18.06 K/uL,Hemoglobin: 7.8 g/dL, Hematocrit: 22.2 %,  Platelet: 111 K/uL  (10-09) Na: 134 mmol/L ; K: 4.1 mmol/L ; BUN: 82 mg/dL ; Cr: 5.27 mg/dL.    RADIOLOGY:  ACC: [id number] EXAM:  CT BRAIN   ORDERED BY: JULIA CRUZ     PROCEDURE DATE:  10/04/2023          INTERPRETATION:  CLINICAL INFORMATION: Altered mental status.    TECHNIQUE:  Noncontrast axial CT images were acquired through the head.  Sagittal and coronal reformats were performed.    COMPARISON STUDY: None    FINDINGS:  There is no CT evidence of acute intracranial hemorrhage, mass effect or   midline shift.  There is no acute loss of gray matter-white matter   differentiation.    Slight prominence of the ventricles is compatible with mild age-related   involutional changes.  No clinically significant chronic microvascular   ischemic disease or other white matter pathology is visualized by CT   technique.      The paranasal sinuses and mastoids are grossly clear.    The patient is status post intraocular lens replacement bilaterally..    The calvarium and skull base appear within normal limits.    IMPRESSION:  No CT evidence of acute intracranial pathology.

## 2023-10-19 ENCOUNTER — TRANSCRIPTION ENCOUNTER (OUTPATIENT)
Age: 78
End: 2023-10-19

## 2023-12-22 RX ORDER — POLYETHYLENE GLYCOL 3350 17 G/17G
17 POWDER, FOR SOLUTION ORAL
Refills: 0 | DISCHARGE

## 2023-12-22 RX ORDER — MIDODRINE HYDROCHLORIDE 2.5 MG/1
2 TABLET ORAL
Refills: 0 | DISCHARGE

## 2023-12-22 RX ORDER — ERYTHROMYCIN BASE 5 MG/GRAM
1 OINTMENT (GRAM) OPHTHALMIC (EYE)
Refills: 0 | DISCHARGE

## 2023-12-22 RX ORDER — SENNA PLUS 8.6 MG/1
2 TABLET ORAL
Refills: 0 | DISCHARGE

## 2023-12-22 RX ORDER — LEVOTHYROXINE SODIUM 125 MCG
1 TABLET ORAL
Refills: 0 | DISCHARGE

## 2023-12-22 RX ORDER — NYSTATIN CREAM 100000 [USP'U]/G
1 CREAM TOPICAL
Refills: 0 | DISCHARGE

## 2023-12-22 RX ORDER — ASPIRIN/CALCIUM CARB/MAGNESIUM 324 MG
1 TABLET ORAL
Refills: 0 | DISCHARGE

## 2023-12-22 RX ORDER — APIXABAN 2.5 MG/1
1 TABLET, FILM COATED ORAL
Refills: 0 | DISCHARGE

## 2023-12-22 RX ORDER — METOPROLOL TARTRATE 50 MG
1 TABLET ORAL
Refills: 0 | DISCHARGE

## 2023-12-22 RX ORDER — FOLIC ACID 0.8 MG
1 TABLET ORAL
Refills: 0 | DISCHARGE

## 2023-12-22 RX ORDER — PANTOPRAZOLE SODIUM 20 MG/1
1 TABLET, DELAYED RELEASE ORAL
Refills: 0 | DISCHARGE

## 2023-12-22 RX ORDER — HYDROCORTISONE 1 %
1 OINTMENT (GRAM) TOPICAL
Refills: 0 | DISCHARGE

## 2023-12-22 RX ORDER — CHOLECALCIFEROL (VITAMIN D3) 125 MCG
1 CAPSULE ORAL
Refills: 0 | DISCHARGE

## 2023-12-22 RX ORDER — POLYMYXIN B SULF/TRIMETHOPRIM 10000-1/ML
1 DROPS OPHTHALMIC (EYE)
Refills: 0 | DISCHARGE

## 2023-12-22 RX ORDER — FERROUS SULFATE 325(65) MG
1 TABLET ORAL
Refills: 0 | DISCHARGE

## 2023-12-22 RX ORDER — MESALAMINE 400 MG
1 TABLET, DELAYED RELEASE (ENTERIC COATED) ORAL
Refills: 0 | DISCHARGE

## 2024-02-21 NOTE — ED PROVIDER NOTE - CADM POA PRESS ULCER
Is the patient currently in the state of [place]? YES    Visit mode:VIDEO    If the visit is dropped, the patient can be reconnected by: VIDEO VISIT: Send to e-mail at: [email or web address]    Will anyone else be joining the visit? NO  (If patient encounters technical issues they should call [phone]    How would you like to obtain your AVS? MyChart    Are changes needed to the allergy or medication list? No    Reason for visit: No chief complaint on file.    Sandra DAMIANF     
No

## 2024-02-29 NOTE — PROGRESS NOTE ADULT - PROVIDER SPECIALTY LIST ADULT
Cardiology Licensed Psychologist and Psychology Trainee Licensed Psychologist and Psychology Trainee Licensed Psychologist Psychology Trainee only

## 2024-03-05 NOTE — PATIENT PROFILE ADULT - HAVE YOU EXPERIENCED VIOLENCE OR A TRAUMATIC EVENT?
What Type Of Note Output Would You Prefer (Optional)?: Bullet Format How Severe Is Your Skin Lesion?: moderate Has Your Skin Lesion Been Treated?: not been treated Is This A New Presentation, Or A Follow-Up?: Skin Lesions Additional History: Pt has skin colored bumps on his abdomen left leg and left arm. He states they are itchy and sometimes tender. no

## 2024-05-07 NOTE — ED ADULT TRIAGE NOTE - SOURCE OF INFORMATION
[facility]  [address] [place] [address]  Phone:[phone]  Fax: [phone]  Follow up visit     Patient Name: Veronique Vick  : 1988  MRN:  [id number]  Today: 2024   LOV:2023  chief complaint: Headache    PCP: No, Primary Doctor.       Assessment:   Veronique Vick is a 35 y.o. right handed female with a PMHx of:  migraines, IBS,  and exposure to HIV (historically negative testing, followed by ID) in follow up for migraines. Appears she has menstrual migraine with some tension HA/myofascial pain. Better on current regimen.       Review:    ICD-10-CM ICD-9-CM   1. Menstrual migraine without status migrainosus, not intractable  G43.829 346.40   2. Episodic migraine  G43.909 346.90             Plan:               -if HA change in quality/nature, will get updated imaging study         For HA Prevention:  1 Continue qulipta 1 pill daily, 90 day supply with a refill sent to the pharmacy        For HA :  Has maxalt       To break up Headaches:  Has course of deltasone to break up HA, she is aware how to break up HA if she should have another bad series     Other:  N/a           All test results as well as any necessary instructions were reviewed and discussed with patient.    Review:  Orders Placed This Encounter    atogepant (QULIPTA) 60 mg Tab             Patient to return to PCP/other specialists for all other problems  Patient to continue on all medications as Rx'd  Full office note available online   RTO- 6 months   The patient indicates understanding of these issues and agrees to the plan.    HPI:   Veronique Vick is a 35 y.o.right handed, female with a PMHx of:  migraines, IBS,  and exposure to HIV (historically negative testing, followed by ID),whom presents solo in follow up for migraines.     At LOV: qulipta, maxalt and prednisone.   No side effects from medicines (qulipta)  No HA   Hasn't had to take  "maxalt  Been HA free for many months  Still has prednisone if needed  Will be starting new job with [employer] in Summer 2024    From previous visit:  started topamax (later found to not tolerate and then started on qulipta), and wrote for maxalt. Course of steroids written to break up HA.   Qulipta working well   1 migraine since last seen   OTC was abortive  Pain along frontalis  Last HA a week ago   Maxalt is also helpful  Steroids helpful   Hasn't had to use maxalt in >1 month         HA historically:  Start:HA for 10 years, around menses typically, usually  last 2 days then go away (with medicine). Has noticed that no medicine has touched most recent HA and they continue to persist (now going on two weeks).  History of trauma (no), History of CNS infection (no), History of Stroke (no)  Location:right temporal region (historically on LEFT side, though switched to opposite side after daithe piercing earlier this year)    Severity: range: 3-7/10  Duration:24-48 hours for migraines, hours for other HA  Frequency:10 days out of month with cervicogenic/tension HA, 2-3 days on average for migraines, however past month has had >2 weeks of symptoms (half of which with pain the other 1/2 with "brain fog" that occurs with HA)   Associated factors (bolded positive) WITH   migraine: Nausea, vomiting, photophobia, phonophobia, tinnitus, scalp pain, vision loss, diplopia, scintillations, eye pain, jaw pain, weakness?    Tried:imitrex, flexeril, OTC, nurtec  Aura: none   Triggers (in bold): menstrual cycle, stress, lack of sleep, too much caffeine, too little caffeine, weather change, seasonal change, strong odours, bright lights, sunlight, food       Currently having a HA?:yes  Positives in bold: Hx of Kidney Stones, asthma, GI bleed, osteoporosis, CAD/MI, CVA/TIA, DM  <---denies   Imaging on file: no   Therapies tried in past: (failures to be marked, if known---why did it fail?)  Phenergan  Imitrex-causes myalgia   OTC  Fioricet "   Ubrelvy  Flexeril  Wellbutrin  Zoloft  Paroxetine  Toradol  Cymbalta  Flexeril  Buspirone  Bupropion   Nifedipine   Physical therapy   Topamax-didn't tolerate   Maxalt  qulipta  Steroids         Medication Reconciliation:   Current Outpatient Medications   Medication Sig Dispense Refill    abacavir-dolutegravir-lamivud (TRIUMEQ) 600- mg Tab Take 1 tablet by mouth once daily. 90 tablet 1    atogepant (QULIPTA) 60 mg Tab Take 1 tablet (60 mg) by mouth once daily. 90 tablet 1    buPROPion (WELLBUTRIN XL) 150 MG TB24 tablet Take 1 tablet (150 mg total) by mouth once daily. 30 tablet 11    cyclobenzaprine (FLEXERIL) 10 MG tablet Take 1 tablet (10 mg total) by mouth 3 (three) times daily as needed for Muscle spasms. 20 tablet 2    ergocalciferol (ERGOCALCIFEROL) 50,000 unit Cap Take 1 capsule (50,000 Units total) by mouth every 7 days. 12 capsule 3    promethazine (PHENERGAN) 12.5 MG Tab Take 12.5 mg by mouth every 4 (four) hours as needed.      rizatriptan (MAXALT-MLT) 5 MG disintegrating tablet Dissolve 1 tablet in mouth at onset of headache; dissolve another tablet in mouth two hours later if needed; DO NOT USE NO MORE THAN 2 TABLETS PER DAY OR 3 DAYS PER WEEK. 10 tablet 5    scopolamine (TRANSDERM-SCOP) 1.3-1.5 mg (1 mg over 3 days) Place 1 patch onto the skin every 72 hours. 4 patch 0    sertraline (ZOLOFT) 50 MG tablet Take 1 tablet (50 mg total) by mouth once daily. 90 tablet 3     No current facility-administered medications for this visit.     Review of patient's allergies indicates:   Allergen Reactions    Eggs [egg derived] Nausea Only     Cannot take egg derived flu vaccine    Ondansetron Dermatitis    Sulfa (sulfonamide antibiotics) Nausea Only    Sulfamethoxazole-trimethoprim Diarrhea       PMHx:  Past Medical History:   Diagnosis Date    Closed fracture of distal end of left radius with routine healing 3/31/2016    Wrist fracture     left     Past Surgical History:   Procedure Laterality Date     colposcopy      multiple    ORIF RADIUS & ULNA FRACTURES Left 2016    WISDOM TOOTH EXTRACTION         Fhx:   Family History   Problem Relation Name Age of Onset    Migraines Mother      Hyperlipidemia Father      Hypertension Father      Migraines Sister         Shx: NP family med   Social History     Socioeconomic History    Marital status: Single   Tobacco Use    Smoking status: Never    Smokeless tobacco: Never   Substance and Sexual Activity    Alcohol use: Yes     Alcohol/week: 0.0 standard drinks of alcohol     Comment: social    Drug use: No    Sexual activity: Yes     Partners: Male     Birth control/protection: I.U.D.     Social Determinants of Health     Financial Resource Strain: Low Risk  (11/21/2023)    Overall Financial Resource Strain (CARDIA)     Difficulty of Paying Living Expenses: Not hard at all   Food Insecurity: No Food Insecurity (11/21/2023)    Hunger Vital Sign     Worried About Running Out of Food in the Last Year: Never true     Ran Out of Food in the Last Year: Never true   Transportation Needs: No Transportation Needs (11/21/2023)    PRAPARE - Transportation     Lack of Transportation (Medical): No     Lack of Transportation (Non-Medical): No   Physical Activity: Insufficiently Active (11/21/2023)    Exercise Vital Sign     Days of Exercise per Week: 3 days     Minutes of Exercise per Session: 30 min   Stress: Stress Concern Present (11/21/2023)    [employer] - Occupational Stress Questionnaire     Feeling of Stress : Rather much   Housing Stability: Low Risk  (11/21/2023)    Housing Stability Vital Sign     Unable to Pay for Housing in the Last Year: No     Number of Places Lived in the Last Year: 1     Unstable Housing in the Last Year: No           Labs:   Reviewed in chart     Imaging:   Reviewed in chart       Other testing:  Reviewed in chart     Note: I have independently reviewed any/all imaging/labs/tests and agree with the report (s) as documented.  " Any discrepancies will be as noted/demarcated by free text.  JUAN JOSE SADLER 5/7/2024                     ROS:   Review Of Systems (questions asked, positive or additions in BOLD)  Gen: Weight change, fatigue/malaise, pyrexia   HEENT: Tinnitus, headache,  blurred vision, eye pain, diplopia, photophobia,  nose bleeds, congestion, sore throat, jaw pain, scalp pain, neck stiffness   Card: Palpitations, CP   Pulm: SOB, cough   Vas: Easy bruising, easy bleeding   GI: N/V/D/C, incontinence, hematemesis, hematochezia    : incontinence, hematuria   Endocrine: Temp intolerance, polyuria, polydipsia   M/S: Neck pain, myalgia, back pain, joint pain, falls    Neuro: PER HPI   PSY: Memory loss, confusion, depression, anxiety, trouble in sleep, hallucinations          EXAM:   Remains essentially the same   /80 (BP Location: Right arm, Patient Position: Sitting, BP Method: Medium (Automatic))   Pulse 62   Temp 97.6 °F (36.4 °C) (Temporal)   Resp 17   Ht 5' 7" (1.702 m)   Wt 81.6 kg (179 lb 14.3 oz)   LMP 05/04/2024   BMI 28.18 kg/m²    GEN:  NAD  HEENT: NC/AT   EXTREM:   no edema present.    NEURO:  Mental Status:  Awake, alert and appropriately oriented to time, place, and person.  Normal attention and concentration.  Speech is fluent and appropriate language function (I.e., comprehension, repetition, etc).     Cranial Nerves:       Extraocular movements are intact and without nystagmus.  Visual fields are full to confrontation testing. Facial movement is symmetric.   Hearing is normal. Uvula in midline. FROM of neck in all (6) directions, shoulder shrug symmetrical. Tongue in midline without fasiculation.     Motor:  antigravity in all limbs   Tremor/pronator drift  not apparent.      DTR  Knee jerk 2+ 2+        Coordination:  FTN-WNL.      Gait and Stance: Normal manner of stance and gait function testing          This document has been electronically signed by Mr. Franklin Franco MPA, PA-C on 5/7/2024, I have " personally typed this message using the EMR.       Dr Kishore MD was available during today's visit.            CC: No, Primary Doctor             [facility]

## 2024-05-17 NOTE — CONSULT NOTE ADULT - REASON FOR ADMISSION
Patient: Ray Bustillo    Procedure Summary       Date: 05/17/24 Room / Location: [facility] MAIN OR [address] [place] [address] MAIN OR    Anesthesia Start: 1237 Anesthesia Stop: 1559    Procedures:       L4-S1 laminectomy, L4-5 fusion (Spine Lumbar)      POSTERIOR LUMBAR INTERBODY FUSION - MIS TLIF 1 LEVEL (Spine Lumbar) Diagnosis: (Lumbar radiculopathy, spinal stenosis lumbar region with neurogenic claudication)    Surgeons: Jason Palomares MD Anesthesiologist: Renae Moreno MD    Anesthesia Type: general ASA Status: 3            Anesthesia Type: general    Vitals Value Taken Time   /77 05/17/24 1556   Temp 97.6 05/17/24 1600   Pulse 80 05/17/24 1559   Resp 18 05/17/24 1559   SpO2 94 % 05/17/24 1559   Vitals shown include unfiled device data.    [facility] AN Post Evaluation:   Patient Evaluated in PACU  Patient Participation: complete - patient participated  Level of Consciousness: awake and alert  Pain Score: 2  Pain Management: adequate  Airway Patency:patent  Dental exam unchanged from preop  Yes    Nausea/Vomiting: none  Cardiovascular Status: hemodynamically stable  Respiratory Status: spontaneous ventilation and nasal cannula  Postoperative Hydration balanced      Renae Moreno MD  5/17/2024 4:00 PM  
sob

## 2024-07-10 NOTE — DISCHARGE NOTE NURSING/CASE MANAGEMENT/SOCIAL WORK - NSFLUVACAGEDISCH_IMM_ALL_CORE
# Discharge Instructions: Recovery After Aquablation(Transurethral waterjet ablation of the Prostate)    ## Activity  - **Rest and Recovery:** Take it easy for the first few days after your surgery. Avoid strenuous activities and heavy lifting (anything over 10 pounds) for at least 4-6 weeks.  - **Walking:** Light walking is encouraged to help improve circulation and prevent blood clots.  - **Driving:** Do not drive for at least 5 days post-surgery or until you are no longer taking pain medications that may impair your ability to drive.    ## Wound Care  - **Catheter Care:** If you go home with a catheter, follow the specific instructions given by your healthcare provider for its care and maintenance. Keep the area clean and dry.  - **Catheter Removal:** Your catheter will typically be removed within 1-2 days after the procedure. You may experience some burning or discomfort during urination for a few days afterward.    ## Urinary Symptoms  - **Blood in Urine:** It is normal to see blood in your urine for a few weeks after surgery. It should gradually become lighter. Drink plenty of water to help flush out your bladder.  - **Frequency and Urgency:** You may experience an increased frequency and urgency to urinate. This should improve over time.  - **Discomfort:** Mild discomfort or burning during urination is common and should decrease within a few weeks. If it persists or worsens, contact your healthcare provider.    ## Pain Management  - **Medications:** Take any prescribed pain medications as directed. Over-the-counter pain relievers like acetaminophen (Tylenol) can also help manage pain. Avoid nonsteroidal anti-inflammatory drugs (NSAIDs) like ibuprofen (Advil, Motrin) unless approved by your doctor, as they can increase the risk of bleeding.  - **Bladder Spasms:** You may experience bladder spasms, which can cause discomfort. If they are severe, contact your healthcare provider for possible medication.    ##  Adult

## 2024-07-31 NOTE — PATIENT PROFILE ADULT - NSPRONUTRITIONRISK_GEN_A_NUR
Subjective   The ABCs of the Annual Wellness Visit  Medicare Wellness Visit      Bharti Nobles is a 80 y.o. patient who presents for a Medicare Wellness Visit.    The following portions of the patient's history were reviewed and   updated as appropriate: allergies, current medications, past family history, past medical history, past social history, past surgical history, and problem list.    Compared to one year ago, the patient's physical   health is the same.  Compared to one year ago, the patient's mental   health is the same.    Recent Hospitalizations:  She was not admitted to the hospital during the last year.     Current Medical Providers:  Patient Care Team:  Reza Oakes MD as PCP - General (Internal Medicine)  Been, Jesus YEAGER MD as Surgeon (Orthopedic Surgery)    Outpatient Medications Prior to Visit   Medication Sig Dispense Refill    amLODIPine (NORVASC) 5 MG tablet Take 1 tablet by mouth Daily As Needed (Elevated blood pressure). 30 tablet 1    omeprazole (priLOSEC) 20 MG capsule Take 1 capsule by mouth Daily.      tiZANidine (ZANAFLEX) 4 MG tablet Take 1 tablet by mouth At Night As Needed for Muscle Spasms. 90 tablet 1    valsartan (DIOVAN) 80 MG tablet Take 1 tablet by mouth 2 (Two) Times a Day. 180 tablet 1    pravastatin (PRAVACHOL) 20 MG tablet Take 1 tablet by mouth Daily. 90 tablet 1    sucralfate (CARAFATE) 1 g tablet Take 1 tablet by mouth 2 (Two) Times a Day. 180 tablet 1     No facility-administered medications prior to visit.     No opioid medication identified on active medication list. I have reviewed chart for other potential  high risk medication/s and harmful drug interactions in the elderly.      Aspirin is not on active medication list.  Aspirin use is not indicated based on review of current medical condition/s. Risk of harm outweighs potential benefits.  .    Patient Active Problem List   Diagnosis    Well adult exam    Symptomatic bradycardia    Essential hypertension  "   Chronic gastric ulcer requiring drug therapy    Female stress incontinence    Mixed hyperlipidemia    Medicare annual wellness visit, subsequent    Vitamin D deficiency    History of dizziness    Stage 3a chronic kidney disease    Multiple thyroid nodules     Advance Care Planning (Click this link to access ACP Navigator)  Advance Directive is on file.  ACP discussion was held with the patient during this visit. Patient has an advance directive in EMR which is still valid.         Objective   Vitals:    24 1200   BP: 142/82   Pulse: 72   Temp: 97.8 °F (36.6 °C)   SpO2: 94%   Weight: 67.6 kg (149 lb)   Height: 154.9 cm (60.98\")       Estimated body mass index is 28.17 kg/m² as calculated from the following:    Height as of this encounter: 154.9 cm (60.98\").    Weight as of this encounter: 67.6 kg (149 lb).             Does the patient have evidence of cognitive impairment? No  Lab Results   Component Value Date    TRIG 152 (H) 2024    HDL 58 2024     (H) 2024    VLDL 27 2024    HGBA1C 5.10 2024                                                                                               Health  Risk Assessment    Smoking Status:  Social History     Tobacco Use   Smoking Status Never    Passive exposure: Never   Smokeless Tobacco Never     Alcohol Consumption:  Social History     Substance and Sexual Activity   Alcohol Use Yes    Comment: Occassionally, once a month     Fall Risk Screen:  GAYATHRI Fall Risk Assessment was completed, and patient is at LOW risk for falls.Assessment completed on:2024    Depression Screenin/31/2024    12:06 PM   PHQ-2/PHQ-9 Depression Screening   Little Interest or Pleasure in Doing Things 0-->not at all   Feeling Down, Depressed or Hopeless 1-->several days   PHQ-9: Brief Depression Severity Measure Score 1     Health Habits and Functional and Cognitive Screenin/31/2024    12:07 PM   Functional & Cognitive Status   Do you " have difficulty preparing food and eating? No   Do you have difficulty bathing yourself, getting dressed or grooming yourself? No   Do you have difficulty using the toilet? No   Do you have difficulty moving around from place to place? No   Do you have trouble with steps or getting out of a bed or a chair? No   Current Diet Other   Dental Exam Not up to date   Eye Exam Up to date   Exercise (times per week) Other   Current Exercises Include No Regular Exercise   Do you need help using the phone?  No   Are you deaf or do you have serious difficulty hearing?  No   Do you need help to go to places out of walking distance? No   Do you need help shopping? No   Do you need help preparing meals?  No   Do you need help with housework?  No   Do you need help with laundry? No   Do you need help taking your medications? No   Do you need help managing money? No   Do you ever drive or ride in a car without wearing a seat belt? No   Have you felt unusual stress, anger or loneliness in the last month? No   Who do you live with? Child   If you need help, do you have trouble finding someone available to you? No   Have you been bothered in the last four weeks by sexual problems? No   Do you have difficulty concentrating, remembering or making decisions? No             Age-appropriate Screening Schedule:  Refer to the list below for future screening recommendations based on patient's age, sex and/or medical conditions. Orders for these recommended tests are listed in the plan section. The patient has been provided with a written plan.    Health Maintenance List  Health Maintenance   Topic Date Due    TDAP/TD VACCINES (1 - Tdap) Never done    RSV Vaccine - Adults (1 - 1-dose 60+ series) Never done    Pneumococcal Vaccine 65+ (2 of 2 - PCV) 08/15/2020    DXA SCAN  08/27/2021    ANNUAL WELLNESS VISIT  08/02/2023    LIPID PANEL  07/05/2025    BMI FOLLOWUP  07/31/2025    HEPATITIS C SCREENING  Completed    ZOSTER VACCINE  Completed     COVID-19 Vaccine  Discontinued    INFLUENZA VACCINE  Discontinued    COLORECTAL CANCER SCREENING  Discontinued                                                                                                                                                CMS Preventative Services Quick Reference  Risk Factors Identified During Encounter  None Identified    The above risks/problems have been discussed with the patient.  Pertinent information has been shared with the patient in the After Visit Summary.  An After Visit Summary and PPPS were made available to the patient.    Follow Up:   Next Medicare Wellness visit to be scheduled in 1 year.     Assessment & Plan  Medicare annual wellness visit, subsequent  AWV completed 7/24.  Patient remains active and independent.  She is having a flareup of her chronic dizziness presently, but no recent falls.  No overnight hospitalizations past year.  Her living will is already on file at the hospital.  Essential hypertension  Patient's blood pressure is controlled as of her 7/24 OV.  I was under the impression she took Diovan every morning and then based whether she would take the second dose on what her blood pressure was running in the evening.  But if her blood pressure is in the 120 ballpark she will not take the morning dose either.    She has not needed to use any of the amlodipine here recently.  Believe that was given to her when she had some systolic pressures in the 200 ballpark in the ER.  Mixed hyperlipidemia   Patient's LDL is down from 190 to 140 as of her 7/24 OV.  She is just on low-dose pravastatin, recommend we titrate that at this time.  Repeat labs in about 6 months.  History of dizziness  Patient with longstanding history of this, she is been in the ER with them before, had a formal workup.  She was also sent to the vestibular clinic previously.    Blood pressure stable presently, she is not having any double vision etc.    Reviewed the previous head CT with  results as noted above.  Stage 3a chronic kidney disease  Patient's GFR is stable at 58 as of 7/21.  No concerning electrolyte abnormalities.  Multiple thyroid nodules  Patient is been monitored for this over the years, she wants to wait another year before checking it, we will get this in 2025.  Of note her thyroid labs are normal at her 7/24 OV.     New Medications Ordered This Visit   Medications    sucralfate (CARAFATE) 1 g tablet     Sig: Take 1 tablet by mouth 2 (Two) Times a Day.     Dispense:  180 tablet     Refill:  1    pravastatin (PRAVACHOL) 40 MG tablet     Sig: Take 1 tablet by mouth Every Night.     Dispense:  90 tablet     Refill:  1          Follow Up:   Return in about 6 months (around 1/31/2025).         No indicators present

## 2024-08-01 NOTE — ASU PATIENT PROFILE, ADULT - VISION (WITH CORRECTIVE LENSES IF THE PATIENT USUALLY WEARS THEM):
glasses. No Partially impaired: cannot see medication labels or newsprint, but can see obstacles in path, and the surrounding layout; can count fingers at arm's length/glasses.

## 2024-08-29 NOTE — ASU PATIENT PROFILE, ADULT - NSSUBSTANCEUSE_GEN_ALL_CORE_SD
Render Risk Assessment In Note?: no
Detail Level: Detailed
Additional Notes: Discussed possible field treatment initiation in Dec
caffeine

## 2024-09-06 NOTE — DIETITIAN INITIAL EVALUATION ADULT - FEEDING SKILL
POC reviewed this shift and is ongoing.  Pt cooperative with staff and interacts with peers. Pt + a/v hallucinations. Med compliant. No ss of distress. Will continue to monitor for changes and safety.   independent

## 2024-09-11 NOTE — ED ADULT NURSE NOTE - NS ED NURSE DISCH DISPOSITION
Quality 226: Preventive Care And Screening: Tobacco Use: Screening And Cessation Intervention: Patient screened for tobacco use, is a smoker AND did not receive Cessation Counseling during measurement period or in the six months prior to the measurement period Quality 155 (Denominator): Falls Plan Of Care: Falls plan of care documented Quality 431: Preventive Care And Screening: Unhealthy Alcohol Use - Screening: Patient identified as an unhealthy alcohol user when screened for unhealthy alcohol use using a systematic screening method and received brief counseling Quality 130: Documentation Of Current Medications In The Medical Record: Current Medications Documented Detail Level: Detailed Quality 47: Advance Care Plan: Advance Care Planning discussed and documented in the medical record; patient did not wish or was not able to name a surrogate decision maker or provide an advance care plan. Admitted

## 2024-10-03 NOTE — PHYSICAL EXAM
[de-identified] : Right Upper Extremity\par o Hand :\par ¦ Inspection/Palpation : no tenderness to palpation or pain with axial compression\par ¦ Range of Motion : full arc of motion in the small joints of the hand, no discomfort elicited\par ¦ Strength : all intrinsic and extrinsic hand muscles 5/5\par ¦ Stability : no joint instability on provocative testing\par o Muscle Bulk : no atrophy\par o Sensation : sensation intact to light touch\par o Skin : no skin lesions or discoloration\par o Vascular Exam : no edema or cyanosis, radial and ulnar pulses normal\par \par Left Upper Extremity\par o Hand :\par ¦ Inspection/Palpation : distal radius tender to palpation, no deformity\par ¦ Range of Motion : full arc of motion in the small joints of the hand, no discomfort elicited\par ¦ Strength : all intrinsic and extrinsic hand muscles 5/5\par ¦ Stability : no joint instability on provocative testing\par o Muscle Bulk : + thenar eminence atrophy. \par o Sensation : sensation intact to light touch\par o Skin : no skin lesions or discoloration\par o Vascular Exam : no edema or cyanosis, radial and ulnar pulses normal\par o Special Tests: Tinels (+ carpal tunnel, - cubital tunnel) \par \par Left Lower Extremity\par o Knee :\par ¦ Inspection/Palpation : no tenderness to palpation, no swelling, no deformity, erythema anterior aspect distal 1/3 of lower leg, with tenderness to palpation, incision well healed. \par ¦ Range of Motion : 0 -120 degrees, no crepitus\par ¦ Stability : no valgus or varus instability present on provocative testing, Lachman’s Test (-)\par ¦ Strength : hip flexion 5-/5\par o Muscle Bulk : normal muscle bulk present\par o Skin : no erythema, no ecchymosis\par o Sensation : sensation to pin intact\par o Vascular Exam : 2+ pitting edema, erythema anterior aspect distal 1/3 of lower leg, with tenderness to palpation,  no cyanosis, dorsalis pedis artery pulse 2+, posterior tibial artery pulse 2+ \par \par o Ankle :\par ¦ Inspection/Palpation : no ankle joint tenderness to palpation, no swelling, incision well healed. \par ¦ Range of Motion : arc of motion full and painless in all planes\par ¦ Stability : no joint instability on provocative testing\par ¦ Strength : all muscles 5/5\par \par  Quality 110: Preventive Care And Screening: Influenza Immunization: Influenza Immunization Administered during Influenza season [de-identified] : o Left Tibia and Fibula: AP, and lateral views of the tibia and fibula were obtained, there are no soft tissue abnormalities, no fractures, alignment is normal, s/p knee fusion, normal bone density, no bony lesions, intramedullary latha in place, hardware in good positioning and proper alignment, no signs of loosening. \par \par o Left Wrist: AP, lateral and oblique views of the Wrist were obtained, there are no soft tissue abnormalities,  alignment is normal, normal appearing joint spaces, normal bone density, no bony lesions, chronically appearing, mildly displaced transverse fracture of distal radius with mild dorsal angulation, fracture is well healed with callus formation present. \par \par   Quality 226: Preventive Care And Screening: Tobacco Use: Screening And Cessation Intervention: Patient screened for tobacco use and is an ex/non-smoker Detail Level: Detailed Quality 130: Documentation Of Current Medications In The Medical Record: Current Medications Documented Quality 47: Advance Care Plan: Advance Care Planning discussed and documented in the medical record; patient did not wish or was not able to name a surrogate decision maker or provide an advance care plan.

## 2025-03-12 NOTE — PATIENT PROFILE ADULT - FUNCTIONAL ASSESSMENT - BASIC MOBILITY SCORE.
RD assessment warranted for: ICU length of stay. Chart reviewed, events noted.  Source: medical record, patient, RN. 12

## 2025-03-31 NOTE — OCCUPATIONAL THERAPY INITIAL EVALUATION ADULT - PRECAUTIONS/LIMITATIONS, REHAB EVAL
fall precautions 83 yold male to [facility] manor  Pmhx Copd, Htn, Dm, schizophrenia, Hld, non-hodgkins lymphoma sent to the ED from [facility] for hematuria/blood in diaper. Per patient transfer information patient was found yesterday morning to have blood in the bains bag, with small clots noted around the tip of the penis. Unclear if there was any trauma, patient denying, but poor historian. Patient has had chronic bains in since admission in January where he was treated for MDRO in the urine, retention, and uretal dilation for bains catheter placement by urology, last exchanged aprox 1 month ago.

## 2025-05-13 NOTE — ED ADULT TRIAGE NOTE - TEMPERATURE IN FAHRENHEIT (DEGREES F)
Interventional Radiology Brief Postprocedure Note    Procedure: Paracentesis    Provider: KEKE Goldstein    Assistant: None    Diagnosis: Ascites    Description of procedure: A time out was performed and Right Hemiabdomen was examined with US and appropriate entry point was confirmed and marked.  The patient was prepped and draped in a sterile manner, 1% lidocaine was used to anesthesize the skin and subcutaneous tissue. A 5F Centesis needle was then introduced through the skin into the peritoneal space, the centesis catheter was then threaded without difficulty. 2500 ml of yellow fluid was removed without difficulty. The catheter was then removed.  No immediate complications were noted during and immediately following the procedure.          Medications (Filter: Administrations occurring from 1102 to 1123 on 05/13/25) As of 05/13/25 1123      lidocaine PF (Xylocaine) 10 mg/mL (1 %) injection (mL) Total volume:  10 mL      Date/Time Rate/Dose/Volume Action       05/13/25  1113 10 mL Given                     Complications: None    Estimated Blood Loss: none        See detailed result report with images in PACS.    The patient tolerated the procedure well without incident or complication and is in stable condition.      97.4

## 2025-05-16 NOTE — CHART NOTE - NSCHARTNOTESELECT_GEN_ALL_CORE
Emergency Department Provider Note        History of Present Illness     CC: Hypertension     HPI:  Davidson Khoury is a 59 yo male with a past medical history of renal transplant and hypertension presenting with a chief complaint of blurry vision, left hand numbness, and headache. His bilateral blurry vision and left hand numbness began yesterday and resolved spontaneously today.  States that he has had similar symptoms prior when his blood pressure was low so he felt that that was the problem.  He also endorses a dull temporal headache. He felt that his heart was more tachycardic than usual but without palpitations. Patient believes he was hypotensive yesterday and said his symptoms resolved after moving around.  Denies any nausea or vomiting.  Denies any fevers or recent illnesses.  Otherwise felt well prior to this episode yesterday.  At this time he feels that he is back to normal and has no complaints.    Limitations to history: None  Independent historian(s): None  Records Reviewed: Recent available ED and inpatient notes reviewed in EMR.    PMHx/PSHx:  Per HPI.   - has a past medical history of Personal history of other diseases of urinary system, Personal history of other specified conditions (01/12/2022), and Personal history of other specified conditions (08/22/2019).  - has a past surgical history that includes Other surgical history (06/06/2016); Other surgical history (06/06/2016); and Other surgical history (08/23/2019).    Medications:  Reviewed in EMR. See EMR for complete list of medications and doses.    Allergies:  Amoxicillin and Ace inhibitors    Social History:  - Tobacco:  reports that he has quit smoking. His smoking use included cigarettes. He has never used smokeless tobacco.   - Alcohol:  reports no history of alcohol use.   - Illicit Drugs:  reports no history of drug use.     ROS:  Per HPI.       Physical Exam     Triage Vitals:  T 36.2 °C (97.1 °F)  HR (!) 105  BP (!) 175/121  RR 18  Post-Op Check  O2 100 %      General: Patient resting comfortably in bed, no acute distress, breathing easily, overall well appearing, and appropriately conversational without confusion or gross mental status changes.  Head: Normocephalic. Atraumatic.  Neck:  FROM. No gross masses.   Eyes: EOMI. No scleral icterus or injection.  ENT: Moist mucous membranes, no apparent trauma or lesions.  CV: Regular rhythm. No murmurs, rubs, gallops appreciated. 2+ radial pulses bilaterally.  Resp: Clear to auscultation bilaterally. No respiratory distress.   GI: Soft, non-distended.  No tenderness with palpation.    EXT: No peripheral edema, contusions, or wounds.  Skin: Warm and dry, no rashes or lesions.  Neuro: Alert and oriented.  Cranial nerves II-XII intact.  No focal neurological deficits.  5 out of 5 strength in bilateral upper and lower extremities.  Sensation intact throughout.  Speech fluent.  Gait normal.  Psych: Appropriate mood and behavior, converses and responds appropriately.      Medical Decision Making & ED Course     Labs:   Labs Reviewed   CBC WITH AUTO DIFFERENTIAL - Abnormal       Result Value    WBC 5.5      nRBC 0.0      RBC 4.80      Hemoglobin 14.3      Hematocrit 41.9      MCV 87      MCH 29.8      MCHC 34.1      RDW 13.4      Platelets 97 (*)     Neutrophils % 69.5      Immature Granulocytes %, Automated 0.4      Lymphocytes % 20.8      Monocytes % 8.9      Eosinophils % 0.2      Basophils % 0.2      Neutrophils Absolute 3.84      Immature Granulocytes Absolute, Automated 0.02      Lymphocytes Absolute 1.15 (*)     Monocytes Absolute 0.49      Eosinophils Absolute 0.01      Basophils Absolute 0.01     COMPREHENSIVE METABOLIC PANEL - Abnormal    Glucose 101 (*)     Sodium 141      Potassium 3.8      Chloride 108 (*)     Bicarbonate 23      Anion Gap 14      Urea Nitrogen 14      Creatinine 1.88 (*)     eGFR 41 (*)     Calcium 9.2      Albumin 3.9      Alkaline Phosphatase 141 (*)     Total Protein 6.8      AST 16       Bilirubin, Total 0.7      ALT 11     URINALYSIS WITH REFLEX CULTURE AND MICROSCOPIC - Abnormal    Color, Urine Yellow      Appearance, Urine Clear      Specific Gravity, Urine 1.021      pH, Urine 6.5      Protein, Urine 30 (1+) (*)     Glucose, Urine Normal      Blood, Urine NEGATIVE      Ketones, Urine NEGATIVE      Bilirubin, Urine NEGATIVE      Urobilinogen, Urine 8 (3+) (*)     Nitrite, Urine NEGATIVE      Leukocyte Esterase, Urine NEGATIVE     MAGNESIUM - Normal    Magnesium 1.61     TROPONIN I, HIGH SENSITIVITY - Normal    Troponin I, High Sensitivity (CMC) 17      Narrative:     Less than 99th percentile of normal range cutoff-  Female and children under 18 years old <35 ng/L; Male <54 ng/L: Negative  Repeat testing should be performed if clinically indicated.     Female and children under 18 years old  ng/L; Male  ng/L:  Consistent with possible cardiac damage and possible increased clinical   risk. Serial measurements may help to assess extent of myocardial damage.     >120 ng/L: Consistent with cardiac damage, increased clinical risk and  myocardial infarction. Serial measurements may help assess extent of   myocardial damage.      NOTE: Children less than 1 year old may have higher baseline troponin   levels and results should be interpreted in conjunction with the overall   clinical context.    NOTE: Troponin I testing is performed using a different   testing methodology at [facility] than at other   [facility]. Direct result comparisons should only   be made within the same method.     URINALYSIS WITH REFLEX CULTURE AND MICROSCOPIC    Narrative:     The following orders were created for panel order Urinalysis with Reflex Culture and Microscopic.  Procedure                               Abnormality         Status                     ---------                               -----------         ------                     Urinalysis with Reflex C...[[id number]]  Abnormal             Final result               Extra Urine Gray Tube[[id number]]                            Final result                 Please view results for these tests on the individual orders.   EXTRA URINE GRAY TUBE    Extra Tube Hold for add-ons.     URINALYSIS MICROSCOPIC WITH REFLEX CULTURE    WBC, Urine NONE      RBC, Urine 1-2      Mucus, Urine FEW          Imaging:   CT head wo IV contrast   Final Result   No evidence of acute cortical infarct or intracranial hemorrhage.                  MACRO:   None        Signed by: Jered Lara 5/15/2025 8:36 PM   Dictation workstation:   [id number]           EK: 53: Rate of 95 bpm, regular rhythm, normal axis, normal intervals, no evidence of ST segment elevations or depressions, no pattern T wave abnormalities.    MDM:  This is a 60-year-old male who presents to the emergency department with multiple medical complaints including headache, weakness, blurred vision and dizziness.  His symptoms are mostly yesterday have resolved by the time he presents.  Upon arrival he is hemodynamically stable though hypertensive and slightly tachycardic to 105.  His heart rate improved as well as his blood pressure.  Other vitals normal.  Physical exam as above is largely unremarkable.  His neurological exam is nonfocal and completely intact.  Differential considered includes electrolyte abnormalities, TIA, infection.  Patient does have a history of kidney transplant though makes urine and has had no changes to that recently.  Did not necessarily suspect an issue with his transplant.  He is compliant with his antirejection medications.  Workup initiated with labs and imaging.  CT head obtained is largely unremarkable.  There is no leukocytosis or anemia.  No significant electrolyte abnormalities.  Creatinine is consistent with his baseline.  No liver dysfunction.  Troponin is normal and EKG shows no ischemic changes.  Urinalysis is unremarkable.  Patient was given small bolus of fluids.   States that he is feeling well on reevaluation.  Discussed symptoms, workup and results.  He is informed that I cannot definitively rule out a TIA or stroke and would require an MRI.  Feel that he appears well and has a normal neurological exam at this time so outpatient workup for this would be appropriate.  Recommended that he follow-up with his primary doctor for reevaluation and for further stroke evaluation if needed.  At this time he is comfortable going home.  He is recommended to continue his current medication regimen.  Return precautions discussed.  He expressed understanding and agreed with the plan.  Remained stable and was discharged home.      ED Course:  Diagnoses as of 05/17/25 1247   Weakness       Independent Result Review and Interpretation: Relevant laboratory and radiographic results were reviewed and independently interpreted by myself.  As necessary, they are commented on in the ED Course.    Social Determinants Limiting Care:  None identified      Patient seen by and discussed with the attending emergency medicine physician.       Disposition    Discharge    Gera Martin DO   Emergency Medicine PGY-3  [facility]      Procedures      Procedures ? SmartLinks last updated 5/17/2025 12:47 PM        Gera Martin DO  Resident  05/17/25 1248

## 2025-07-07 NOTE — PHYSICAL THERAPY INITIAL EVALUATION ADULT - IMPAIRMENTS CONTRIBUTING IMPAIRED BED MOBILITY, REHAB EVAL
impaired balance/pain/decreased ROM/decreased strength
PAST MEDICAL HISTORY:  Chronic cholecystitis     Elevated liver enzymes     Gallbladder polyp     History of epistaxis     Mild type 1 von Willebrand disease     NAFLD (nonalcoholic fatty liver disease)     
Patient/Caregiver provided printed discharge information.

## 2025-07-23 NOTE — PROGRESS NOTE ADULT - PROVIDER SPECIALTY LIST ADULT
Follow up with the your primary care doctor and/or your pulmonologist by calling for appointment.  Use medications as prescribed return to the emergency department if condition worsens in any way.   Cardiology Kenalog Preparation: Kenalog Administered By (Optional): Ghada Moseley PA-C Include Z78.9 (Other Specified Conditions Influencing Health Status) As An Associated Diagnosis?: No Detail Level: Simple Concentration Of Solution Injected (Mg/Ml): 10.0 Medical Necessity Clause: This procedure was medically necessary because the lesions that were treated were: Ndc# For Kenalog Only: 5439-4475-49 Total Volume Injected (Ccs- Only Use Numbers And Decimals): 0.5 Lot # (Optional): [id number] Consent: The risks of atrophy were reviewed with the patient. X Size Of Lesion In Cm (Optional): 0 Expiration Date (Optional): AUGUST 2021

## (undated) DEVICE — LAP PAD 18 X 18"

## (undated) DEVICE — GLV 7.5 PROTEXIS (WHITE)

## (undated) DEVICE — SOL IRR POUR NS 0.9% 500ML

## (undated) DEVICE — SAW BLADE MICROAIRE OSCILLATING LG 0.9X64X33.5MM

## (undated) DEVICE — DRAPE LIGHT HANDLE COVER (BLUE)

## (undated) DEVICE — DRAPE THYROID 77" X 123"

## (undated) DEVICE — MEDICATION LABELS W MARKER

## (undated) DEVICE — DRSG STERISTRIPS 0.5 X 4"

## (undated) DEVICE — PREP CHLORAPREP HI-LITE ORANGE 26ML

## (undated) DEVICE — SUCTION YANKAUER NO CONTROL VENT

## (undated) DEVICE — SUT STAINLESS STEEL 5 18" SCC

## (undated) DEVICE — SUT VICRYL PLUS 3-0 18" SH UNDYED (POP-OFF)

## (undated) DEVICE — VISITEC 4X4

## (undated) DEVICE — TUBING BRAT 2 SUCTION ASSEMBLY TWIST LOCK

## (undated) DEVICE — PACK UNIVERSAL CARDIAC

## (undated) DEVICE — SOL IRR POUR H2O 250ML

## (undated) DEVICE — BLADE SCALPEL SAFETYLOCK #11

## (undated) DEVICE — DRSG TEGADERM 6"X8"

## (undated) DEVICE — SUT SILK 2-0 30" TIES

## (undated) DEVICE — PACK BASIC PROCEDURE

## (undated) DEVICE — WARMING BLANKET FULL UNDERBODY

## (undated) DEVICE — GLV 8 PROTEXIS (WHITE)

## (undated) DEVICE — STAPLER SKIN VISI-STAT 35 WIDE

## (undated) DEVICE — PACK AV FISTULA

## (undated) DEVICE — SUT SILK 4-0 18" G-3

## (undated) DEVICE — SUT PROLENE 6-0 30" BV-1

## (undated) DEVICE — SAW BLADE MICROAIRE STERNUM 1X34X9.4MM

## (undated) DEVICE — DRAPE TOWEL BLUE 17" X 24"

## (undated) DEVICE — DRSG DERMABOND PRINEO 60CM

## (undated) DEVICE — DRAPE 1/2 SHEET 40X57"

## (undated) DEVICE — SUT ETHIBOND 0 44" EN3

## (undated) DEVICE — SUT BIOSYN 4-0 18" P-12

## (undated) DEVICE — VENODYNE/SCD SLEEVE CALF MEDIUM

## (undated) DEVICE — SYR LUER LOK 10CC

## (undated) DEVICE — DRSG OPSITE 13.75 X 4"

## (undated) DEVICE — SUT SILK 3-0 18" TIES

## (undated) DEVICE — DRAPE 3/4 SHEET W REINFORCEMENT 56X77"

## (undated) DEVICE — VENODYNE/SCD SLEEVE CALF LARGE

## (undated) DEVICE — SUT SURGICAL STEEL 6 30" BP-1

## (undated) DEVICE — SUT PLEDGET PRE PUNCH 4.8 X 9.5 X 1.5 MM

## (undated) DEVICE — GLV 8.5 PROTEXIS (WHITE)

## (undated) DEVICE — SUT SOFSILK 2-0 18" TIES

## (undated) DEVICE — DRAPE C ARM UNIVERSAL

## (undated) DEVICE — GLV 7 PROTEXIS (WHITE)

## (undated) DEVICE — SUT SOFSILK 0 18" TIES

## (undated) DEVICE — KIT MEDTRONIC WRENCH CARD LEAD DISP

## (undated) DEVICE — DRAPE IOBAN 23" X 23"

## (undated) DEVICE — SUT POLYSORB 0 36" GS-25 UNDYED

## (undated) DEVICE — GOWN TRIMAX LG

## (undated) DEVICE — SLV STER PROGRAM WAND

## (undated) DEVICE — SOL INJ NS 0.9% 500ML 1-PORT

## (undated) DEVICE — SYR ASEPTO

## (undated) DEVICE — NDL HYPO SAFE 25G X 5/8" (ORANGE)

## (undated) DEVICE — SOL NORMOSOL-R PH7.4 1000ML

## (undated) DEVICE — SUT SILK 0 18" CT-1 (POP-OFF)

## (undated) DEVICE — SUT MONOCRYL 4-0 27" PS-2 UNDYED

## (undated) DEVICE — SUT SOFSILK 0 30" V-20

## (undated) DEVICE — GLV 6.5 PROTEXIS (WHITE)

## (undated) DEVICE — DEFIBRILLATOR PAD PRE-CONNECT ADULT/CHILD

## (undated) DEVICE — VALVE 2 MNFLD NAMIC RT

## (undated) DEVICE — DRAPE INSTRUMENT POUCH 6.75" X 11"

## (undated) DEVICE — SUT DOUBLE 6 WIRE STERNAL

## (undated) DEVICE — WARMING BLANKET LOWER ADULT

## (undated) DEVICE — DRSG MASTISOL

## (undated) DEVICE — SUT POLYSORB 3-0 30" V-20 UNDYED

## (undated) DEVICE — POSITIONER FOAM EGG CRATE ULNAR 2PCS (PINK)

## (undated) DEVICE — SYR CCS CORONARY CONTROL INJECT10N 10CC

## (undated) DEVICE — DRAPE MAYO STAND 30"

## (undated) DEVICE — FOLEY TRAY 16FR 5CC LF LUBRISIL ADVANCE TEMP CLOSED

## (undated) DEVICE — SUT POLYSORB 2-0 30" GS-21 UNDYED

## (undated) DEVICE — BLADE SCALPEL SAFETYLOCK #15

## (undated) DEVICE — SPIKE INJ SITE

## (undated) DEVICE — SUT BLUNT SZ 5

## (undated) DEVICE — SPECIMEN CONTAINER 100ML

## (undated) DEVICE — NDL HYPO REGULAR BEVEL 22G X 1.5" (TURQUOISE)

## (undated) DEVICE — DRSG TEGADERM 3.5X6"

## (undated) DEVICE — BLADE SCALPEL SAFETYLOCK #10

## (undated) DEVICE — SUT PROLENE 6-0 30" C-1